# Patient Record
Sex: MALE | Race: WHITE | NOT HISPANIC OR LATINO | Employment: OTHER | URBAN - METROPOLITAN AREA
[De-identification: names, ages, dates, MRNs, and addresses within clinical notes are randomized per-mention and may not be internally consistent; named-entity substitution may affect disease eponyms.]

---

## 2017-01-04 ENCOUNTER — TELEPHONE (OUTPATIENT)
Dept: FAMILY MEDICINE | Facility: CLINIC | Age: 77
End: 2017-01-04

## 2017-01-04 DIAGNOSIS — I10 ESSENTIAL HYPERTENSION: ICD-10-CM

## 2017-01-04 DIAGNOSIS — E78.5 HYPERLIPIDEMIA LDL GOAL <100: ICD-10-CM

## 2017-01-04 DIAGNOSIS — E11.40 TYPE 2 DIABETES, CONTROLLED, WITH NEUROPATHY: Primary | ICD-10-CM

## 2017-01-04 NOTE — TELEPHONE ENCOUNTER
----- Message from Treasure Tipton sent at 1/4/2017  3:33 PM CST -----  Contact: self  Patient requesting labs and would like to come in 2 days prior to OV on 2/3. Please contact Mr Gunter at [phone].    Thanks!

## 2017-01-26 ENCOUNTER — LAB VISIT (OUTPATIENT)
Dept: LAB | Facility: HOSPITAL | Age: 77
End: 2017-01-26
Attending: INTERNAL MEDICINE
Payer: MEDICARE

## 2017-01-26 DIAGNOSIS — E78.5 HYPERLIPIDEMIA LDL GOAL <100: ICD-10-CM

## 2017-01-26 DIAGNOSIS — I10 ESSENTIAL HYPERTENSION: ICD-10-CM

## 2017-01-26 DIAGNOSIS — E11.40 TYPE 2 DIABETES, CONTROLLED, WITH NEUROPATHY: ICD-10-CM

## 2017-01-26 LAB
ALBUMIN SERPL BCP-MCNC: 4.1 G/DL
ALP SERPL-CCNC: 62 U/L
ALT SERPL W/O P-5'-P-CCNC: 43 U/L
ANION GAP SERPL CALC-SCNC: 7 MMOL/L
AST SERPL-CCNC: 29 U/L
BASOPHILS # BLD AUTO: 0.02 K/UL
BASOPHILS NFR BLD: 0.4 %
BILIRUB SERPL-MCNC: 1.1 MG/DL
BUN SERPL-MCNC: 8 MG/DL
CALCIUM SERPL-MCNC: 9.7 MG/DL
CHLORIDE SERPL-SCNC: 106 MMOL/L
CHOLEST/HDLC SERPL: 2.5 {RATIO}
CO2 SERPL-SCNC: 29 MMOL/L
CREAT SERPL-MCNC: 1 MG/DL
DIFFERENTIAL METHOD: ABNORMAL
EOSINOPHIL # BLD AUTO: 0.1 K/UL
EOSINOPHIL NFR BLD: 1.3 %
ERYTHROCYTE [DISTWIDTH] IN BLOOD BY AUTOMATED COUNT: 14.7 %
EST. GFR  (AFRICAN AMERICAN): >60 ML/MIN/1.73 M^2
EST. GFR  (NON AFRICAN AMERICAN): >60 ML/MIN/1.73 M^2
GLUCOSE SERPL-MCNC: 106 MG/DL
HCT VFR BLD AUTO: 46.1 %
HDL/CHOLESTEROL RATIO: 40.4 %
HDLC SERPL-MCNC: 141 MG/DL
HDLC SERPL-MCNC: 57 MG/DL
HGB BLD-MCNC: 15.4 G/DL
LDLC SERPL CALC-MCNC: 71.6 MG/DL
LYMPHOCYTES # BLD AUTO: 2.2 K/UL
LYMPHOCYTES NFR BLD: 41.3 %
MCH RBC QN AUTO: 29.6 PG
MCHC RBC AUTO-ENTMCNC: 33.4 %
MCV RBC AUTO: 89 FL
MONOCYTES # BLD AUTO: 0.3 K/UL
MONOCYTES NFR BLD: 5.9 %
NEUTROPHILS # BLD AUTO: 2.8 K/UL
NEUTROPHILS NFR BLD: 50.9 %
NONHDLC SERPL-MCNC: 84 MG/DL
PLATELET # BLD AUTO: 233 K/UL
PMV BLD AUTO: 11 FL
POTASSIUM SERPL-SCNC: 5.7 MMOL/L
PROT SERPL-MCNC: 7.3 G/DL
RBC # BLD AUTO: 5.2 M/UL
SODIUM SERPL-SCNC: 142 MMOL/L
TRIGL SERPL-MCNC: 62 MG/DL
WBC # BLD AUTO: 5.43 K/UL

## 2017-01-26 PROCEDURE — 80053 COMPREHEN METABOLIC PANEL: CPT

## 2017-01-26 PROCEDURE — 36415 COLL VENOUS BLD VENIPUNCTURE: CPT | Mod: PO

## 2017-01-26 PROCEDURE — 85025 COMPLETE CBC W/AUTO DIFF WBC: CPT

## 2017-01-26 PROCEDURE — 80061 LIPID PANEL: CPT

## 2017-01-26 PROCEDURE — 83036 HEMOGLOBIN GLYCOSYLATED A1C: CPT

## 2017-01-27 LAB
ESTIMATED AVG GLUCOSE: 120 MG/DL
HBA1C MFR BLD HPLC: 5.8 %

## 2017-02-03 ENCOUNTER — OFFICE VISIT (OUTPATIENT)
Dept: FAMILY MEDICINE | Facility: CLINIC | Age: 77
End: 2017-02-03
Payer: MEDICARE

## 2017-02-03 VITALS
BODY MASS INDEX: 24.8 KG/M2 | WEIGHT: 167.44 LBS | OXYGEN SATURATION: 97 % | HEART RATE: 52 BPM | DIASTOLIC BLOOD PRESSURE: 78 MMHG | HEIGHT: 69 IN | SYSTOLIC BLOOD PRESSURE: 130 MMHG | TEMPERATURE: 98 F

## 2017-02-03 DIAGNOSIS — E11.40 TYPE 2 DIABETES, CONTROLLED, WITH NEUROPATHY: ICD-10-CM

## 2017-02-03 DIAGNOSIS — Z00.00 ROUTINE MEDICAL EXAM: Primary | ICD-10-CM

## 2017-02-03 DIAGNOSIS — I10 ESSENTIAL HYPERTENSION: ICD-10-CM

## 2017-02-03 DIAGNOSIS — E78.5 HYPERLIPIDEMIA LDL GOAL <100: ICD-10-CM

## 2017-02-03 DIAGNOSIS — F41.1 ANXIETY STATE, UNSPECIFIED: ICD-10-CM

## 2017-02-03 DIAGNOSIS — M25.511 RIGHT SHOULDER PAIN, UNSPECIFIED CHRONICITY: ICD-10-CM

## 2017-02-03 DIAGNOSIS — R13.12 DYSPHAGIA, OROPHARYNGEAL: ICD-10-CM

## 2017-02-03 LAB
CREAT UR-MCNC: 31 MG/DL
MICROALBUMIN UR DL<=1MG/L-MCNC: <2.5 UG/ML
MICROALBUMIN/CREATININE RATIO: NORMAL UG/MG

## 2017-02-03 PROCEDURE — 3075F SYST BP GE 130 - 139MM HG: CPT | Mod: S$GLB,,, | Performed by: INTERNAL MEDICINE

## 2017-02-03 PROCEDURE — 99999 PR PBB SHADOW E&M-EST. PATIENT-LVL III: CPT | Mod: PBBFAC,,, | Performed by: INTERNAL MEDICINE

## 2017-02-03 PROCEDURE — 99397 PER PM REEVAL EST PAT 65+ YR: CPT | Mod: S$GLB,,, | Performed by: INTERNAL MEDICINE

## 2017-02-03 PROCEDURE — 99499 UNLISTED E&M SERVICE: CPT | Mod: S$PBB,,, | Performed by: INTERNAL MEDICINE

## 2017-02-03 PROCEDURE — 3078F DIAST BP <80 MM HG: CPT | Mod: S$GLB,,, | Performed by: INTERNAL MEDICINE

## 2017-02-03 PROCEDURE — 82570 ASSAY OF URINE CREATININE: CPT

## 2017-02-03 NOTE — MR AVS SNAPSHOT
[facility]  [address]  [place] [address]  Phone: [phone]  Fax: [phone]                  Diego Gunter   2/3/2017 9:40 AM   Office Visit    Description:  Male : 1940   Provider:  Portia Ferreira MD   Department:  [facility]           Reason for Visit     Diabetes     Follow-up           Diagnoses this Visit        Comments    Routine medical exam    -  Primary     Type 2 diabetes, controlled, with neuropathy         Essential hypertension         Hyperlipidemia LDL goal <100         Anxiety state, unspecified         Dysphagia, oropharyngeal         Right shoulder pain, unspecified chronicity                To Do List           Goals (5 Years of Data)              17    HEMOGLOBIN A1C < 7.0   5.8  5.8  5.8    Related Problems    Type 2 diabetes, controlled, with neuropathy      Follow-Up and Disposition     Return in about 6 months (around 8/3/2017), or if symptoms worsen or fail to improve, for follow up chronic medical conditions..      [facility] On Call     [facility] On Call Nurse Care Line -  Assistance  Registered nurses in the [facility] On Call Center provide clinical advisement, health education, appointment booking, and other advisory services.  Call for this free service at [phone].             Medications           Message regarding Medications     Verify the changes and/or additions to your medication regime listed below are the same as discussed with your clinician today.  If any of these changes or additions are incorrect, please notify your healthcare provider.             Verify that the below list of medications is an accurate representation of the medications you are currently taking.  If none reported, the list may be blank. If incorrect, please contact your healthcare provider. Carry this list with you in case of emergency.           Current Medications     ascorbic acid (VITAMIN C) 1000 MG tablet  "Take 1,000 mg by mouth once daily.      loratadine (CLARITIN) 10 mg tablet Take 1 tablet (10 mg total) by mouth daily as needed for Allergies (or runny nose).    omega 3-dha-epa-fish oil 1,000 (120-180) mg Cap Take 1 capsule by mouth once daily.      omeprazole 20 mg TbEC 1/2 tablet daily in am as needed.    pravastatin (PRAVACHOL) 10 MG tablet TAKE ONE TABLET BY MOUTH ONCE DAILY    cetirizine (ZYRTEC) 10 MG tablet Take 1 tablet (10 mg total) by mouth every evening.    hyoscyamine (ANASPAZ,LEVSIN) 0.125 mg Tab Take 1 tablet (125 mcg total) by mouth every 4 (four) hours as needed.           Clinical Reference Information           Your Vitals Were     BP Pulse Temp Height Weight SpO2    130/78 52 98.2 °F (36.8 °C) (Oral) 5' 9" (1.753 m) 76 kg (167 lb 7 oz) 97%    BMI                24.73 kg/m2          Blood Pressure          Most Recent Value    BP  130/78      Allergies as of 2/3/2017     No Known Allergies      Immunizations Administered on Date of Encounter - 2/3/2017     None      Orders Placed During Today's Visit      Normal Orders This Visit    Microalbumin/creatinine urine ratio       Instructions    Colace stool softener  Fiber supplements: colace, metamucil, fibercon       Language Assistance Services     ATTENTION: Language assistance services are available, free of charge. Please call [phone].      ATENCIÓN: Si habla español, tiene a thompson disposición servicios gratuitos de asistencia lingüística. Llame al [phone].     [facility] Ý: N?u b?n nói Ti?ng Vi?t, có các d?ch v? h? tr? ngôn ng? mi?n phí dành cho b?n. G?i s? [phone].         [facility] complies with applicable Federal civil rights laws and does not discriminate on the basis of race, color, national origin, age, disability, or sex.        "

## 2017-02-03 NOTE — PROGRESS NOTES
HISTORY OF PRESENT ILLNESS:  Diego Gunter is a 76 y.o. male who presents to the clinic today for a routine medical physical exam. His last physical exam was approximately 1 years(s) ago.      PAST MEDICAL HISTORY:  Past Medical History   Diagnosis Date    Hx of colonic polyps      followed by GI. Dr. Pham    Hyperlipidemia LDL goal <100     Overweight(278.02)     Prostate cancer september 2011     followed by urology, Dr. Rogers    Type II or unspecified type diabetes mellitus without mention of complication, not stated as uncontrolled      diet controlled       PAST SURGICAL HISTORY:  Past Surgical History   Procedure Laterality Date    Prostatectomy      Cataract extraction, bilateral  2014       SOCIAL HISTORY:  Social History     Social History    Marital status:      Spouse name: N/A    Number of children: 2    Years of education: N/A     Occupational History    tool       Social History Main Topics    Smoking status: Former Smoker    Smokeless tobacco: Never Used      Comment: quit age 23    Alcohol use Yes      Comment: beer occasionally    Drug use: Not on file    Sexual activity: Not on file     Other Topics Concern    Not on file     Social History Narrative       FAMILY HISTORY:  Family History   Problem Relation Age of Onset    Colon cancer Mother     Diabetes Mother     Heart disease Father     Mental illness Sister     Diabetes Sister     Other Brother      polio as a child    Diabetes      Diabetes Brother     Myasthenia gravis Brother     Parkinsonism Brother     Diabetes Brother     Dementia Brother     Lung cancer Brother      smoker    Diabetes Maternal Aunt        ALLERGIES AND MEDICATIONS: updated and reviewed.  Review of patient's allergies indicates:  No Known Allergies  Medication List with Changes/Refills   Current Medications    ASCORBIC ACID (VITAMIN C) 1000 MG TABLET    Take 1,000 mg by mouth once daily.      CETIRIZINE (ZYRTEC) 10  MG TABLET    Take 1 tablet (10 mg total) by mouth every evening.    HYOSCYAMINE (ANASPAZ,LEVSIN) 0.125 MG TAB    Take 1 tablet (125 mcg total) by mouth every 4 (four) hours as needed.    LORATADINE (CLARITIN) 10 MG TABLET    Take 1 tablet (10 mg total) by mouth daily as needed for Allergies (or runny nose).    OMEGA 3-DHA-EPA-FISH OIL 1,000 (120-180) MG CAP    Take 1 capsule by mouth once daily.      OMEPRAZOLE 20 MG TBEC    1/2 tablet daily in am as needed.    PRAVASTATIN (PRAVACHOL) 10 MG TABLET    TAKE ONE TABLET BY MOUTH ONCE DAILY             SCREENING HISTORY:  Health Maintenance       Date Due Completion Date    Urine Microalbumin 8/5/2016 8/5/2015    Eye Exam 7/7/2017 7/7/2016 (Done)    Override on 7/7/2016: Done (Dr. Silva)    Override on 2/3/2015: Done (Dr. Silva - no diabetic retinopathy)    Override on 6/27/2014: Done (Done by Dr. Silva)    Hemoglobin A1c 7/26/2017 1/26/2017    Foot Exam 7/29/2017 7/29/2016    Lipid Panel 1/26/2018 1/26/2017    TETANUS VACCINE 3/8/2025 3/8/2015    Colonoscopy 7/22/2025 7/22/2015 (Done)    Override on 7/22/2015: Done ([facility]. Repeat in 5 years)    Override on 12/1/2005: Done            REVIEW OF SYSTEMS:  The patient reports good dietary habits.  The patient does exercise regularly.  General ROS: negative for - chills, fever or malaise  Psychological ROS: positive for - mild memory difficulties  negative for - obsessive thoughts or suicidal ideation  Ophthalmic ROS: negative for - blurry vision or eye pain  ENT ROS: negative for - epistaxis, headaches, nasal congestion, oral lesions or sore throat  Allergy and Immunology ROS: negative for - hives  Hematological and Lymphatic ROS: negative for - swollen lymph nodes  Endocrine ROS: negative for - polydipsia/polyuria or temperature intolerance  Respiratory ROS: no cough, shortness of breath, or wheezing  Cardiovascular ROS: no chest pain or dyspnea on exertion  Gastrointestinal ROS: no abdominal pain, change in  "bowel habits, or black or bloody stools  Dermatological ROS: negative for mole changes and rash  Musculoskeletal ROS: negative for - gait disturbance, joint swelling, muscle pain or muscular weakness; he is seeing ortho for mild right shoulder pain  Neurological ROS: no TIA or stroke symptoms  Genito-Urinary ROS: no dysuria, trouble voiding, or hematuria        PHYSICAL EXAM:  Vitals:    02/03/17 0932   BP: 130/78   Pulse: (!) 52   Temp: 98.2 °F (36.8 °C)     Weight: 76 kg (167 lb 7 oz)   Height: 5' 9" (175.3 cm)   Body mass index is 24.73 kg/(m^2).    Physical Examination: General appearance - alert, well appearing, and in no distress and normal appearing weight  Mental status - alert, oriented to person, place, and time, normal mood, behavior, speech, dress, motor activity, and thought processes  Eyes - pupils equal and reactive, extraocular eye movements intact, sclera anicteric  Mouth - mucous membranes moist, pharynx normal without lesions  Neck - supple, no significant adenopathy, carotids upstroke normal bilaterally, no bruits, thyroid exam: thyroid is normal in size without nodules or tenderness  Lymphatics - no palpable lymphadenopathy  Chest - clear to auscultation, no wheezes, rales or rhonchi, symmetric air entry  Heart - normal rate and regular rhythm, no gallops noted  Abdomen - soft, nontender, nondistended, no masses or organomegaly   Male - not examined  Back exam - full range of motion, no tenderness, palpable spasm or pain on motion  Neurological - alert, oriented, normal speech, no focal findings or movement disorder noted, cranial nerves II through XII intact  Musculoskeletal - no joint tenderness, deformity or swelling, no muscular tenderness noted  Extremities - peripheral pulses normal, no pedal edema, no clubbing or cyanosis  Skin - normal coloration and turgor, no rashes, no suspicious skin lesions noted       Results for orders placed or performed in visit on 01/26/17   Comprehensive " metabolic panel   Result Value Ref Range    Sodium 142 136 - 145 mmol/L    Potassium 5.7 (H) 3.5 - 5.1 mmol/L    Chloride 106 95 - 110 mmol/L    CO2 29 23 - 29 mmol/L    Glucose 106 70 - 110 mg/dL    BUN, Bld 8 8 - 23 mg/dL    Creatinine 1.0 0.5 - 1.4 mg/dL    Calcium 9.7 8.7 - 10.5 mg/dL    Total Protein 7.3 6.0 - 8.4 g/dL    Albumin 4.1 3.5 - 5.2 g/dL    Total Bilirubin 1.1 (H) 0.1 - 1.0 mg/dL    Alkaline Phosphatase 62 55 - 135 U/L    AST 29 10 - 40 U/L    ALT 43 10 - 44 U/L    Anion Gap 7 (L) 8 - 16 mmol/L    eGFR if African American >60.0 >60 mL/min/1.73 m^2    eGFR if non African American >60.0 >60 mL/min/1.73 m^2   Lipid panel   Result Value Ref Range    Cholesterol 141 120 - 199 mg/dL    Triglycerides 62 30 - 150 mg/dL    HDL 57 40 - 75 mg/dL    LDL Cholesterol 71.6 63.0 - 159.0 mg/dL    HDL/Chol Ratio 40.4 20.0 - 50.0 %    Total Cholesterol/HDL Ratio 2.5 2.0 - 5.0    Non-HDL Cholesterol 84 mg/dL   Hemoglobin A1c   Result Value Ref Range    Hemoglobin A1C 5.8 4.5 - 6.2 %    Estimated Avg Glucose 120 68 - 131 mg/dL   CBC auto differential   Result Value Ref Range    WBC 5.43 3.90 - 12.70 K/uL    RBC 5.20 4.60 - 6.20 M/uL    Hemoglobin 15.4 14.0 - 18.0 g/dL    Hematocrit 46.1 40.0 - 54.0 %    MCV 89 82 - 98 fL    MCH 29.6 27.0 - 31.0 pg    MCHC 33.4 32.0 - 36.0 %    RDW 14.7 (H) 11.5 - 14.5 %    Platelets 233 150 - 350 K/uL    MPV 11.0 9.2 - 12.9 fL    Gran # 2.8 1.8 - 7.7 K/uL    Lymph # 2.2 1.0 - 4.8 K/uL    Mono # 0.3 0.3 - 1.0 K/uL    Eos # 0.1 0.0 - 0.5 K/uL    Baso # 0.02 0.00 - 0.20 K/uL    Gran% 50.9 38.0 - 73.0 %    Lymph% 41.3 18.0 - 48.0 %    Mono% 5.9 4.0 - 15.0 %    Eosinophil% 1.3 0.0 - 8.0 %    Basophil% 0.4 0.0 - 1.9 %    Differential Method Automated           ASSESSMENT AND PLAN:  1. Routine medical exam  Counseled on age appropriate medical preventative services including age appropriate cancer screenings, age appropriate eye and dental exams, over all nutritional health, need for a  consistent exercise regimen, and an over all push towards maintaining a vigorous and active lifestyle.  Counseled on age appropriate vaccines and discussed upcoming health care needs based on age/gender. Discussed good sleep hygiene and stress management.     2. Type 2 diabetes, controlled, with neuropathy  Stable. We discussed low sugar/low carbohydrate diet and regular exercise to prevent progression. No need for prescription medication at this time.  Not on ACE inhibitor due to intermittent hyperkalemia  - Microalbumin/creatinine urine ratio    3. Essential hypertension  Discussed sodium restriction, maintaining ideal body weight and regular exercise program as physiologic means to achieve blood pressure control. The patient will strive towards this. No need for prescription medication at this time.. Recommended patient to check home readings to monitor and see me for followup as scheduled or sooner as needed. Patient was educated that both decongestant and anti-inflammatory medication may raise blood pressure.     4. Hyperlipidemia LDL goal <100  We discussed low fat diet and regular exercise.The current medical regimen is effective;  continue present plan and medications.      5. Anxiety state, unspecified  Stable. Medication as needed.     6. Dysphagia, oropharyngeal  Resolved with ST.    7. Right shoulder pain, unspecified chronicity  He is currently seeing orthopedics and has had a shoulder injection and is doing physical therapy.  Symptoms so far have much improved.          Return in about 6 months (around 8/3/2017), or if symptoms worsen or fail to improve, for follow up chronic medical conditions.. or sooner as needed.

## 2017-03-02 DIAGNOSIS — E78.5 HYPERLIPIDEMIA LDL GOAL <100: ICD-10-CM

## 2017-03-02 RX ORDER — PRAVASTATIN SODIUM 10 MG/1
TABLET ORAL
Qty: 90 TABLET | Refills: 1 | Status: SHIPPED | OUTPATIENT
Start: 2017-03-02 | End: 2017-09-06 | Stop reason: SDUPTHER

## 2017-03-06 ENCOUNTER — TELEPHONE (OUTPATIENT)
Dept: NEUROLOGY | Facility: CLINIC | Age: 77
End: 2017-03-06

## 2017-03-06 NOTE — TELEPHONE ENCOUNTER
----- Message from Jonnie Gonzales sent at 3/6/2017 10:05 AM CST -----  Contact: self [phone]  Patient is calling to schedule a f/u visit, pls call

## 2017-06-02 ENCOUNTER — TELEPHONE (OUTPATIENT)
Dept: FAMILY MEDICINE | Facility: CLINIC | Age: 77
End: 2017-06-02

## 2017-06-02 DIAGNOSIS — E78.5 HYPERLIPIDEMIA LDL GOAL <100: ICD-10-CM

## 2017-06-02 DIAGNOSIS — E11.9 TYPE II DIABETES MELLITUS, WELL CONTROLLED: ICD-10-CM

## 2017-06-02 DIAGNOSIS — I10 ESSENTIAL HYPERTENSION: Primary | ICD-10-CM

## 2017-06-02 NOTE — TELEPHONE ENCOUNTER
----- Message from Clary Gonzalez sent at 6/2/2017  1:30 PM CDT -----  Contact: self [phone]  Pt has a appt on 8-14-17, requesting a lab appt. Pls call pt [phone]. Thanks...........

## 2017-08-08 ENCOUNTER — LAB VISIT (OUTPATIENT)
Dept: LAB | Facility: HOSPITAL | Age: 77
End: 2017-08-08
Attending: INTERNAL MEDICINE
Payer: MEDICARE

## 2017-08-08 DIAGNOSIS — E11.9 TYPE II DIABETES MELLITUS, WELL CONTROLLED: ICD-10-CM

## 2017-08-08 DIAGNOSIS — I10 ESSENTIAL HYPERTENSION: ICD-10-CM

## 2017-08-08 LAB
ANION GAP SERPL CALC-SCNC: 8 MMOL/L
BUN SERPL-MCNC: 8 MG/DL
CALCIUM SERPL-MCNC: 9.4 MG/DL
CHLORIDE SERPL-SCNC: 104 MMOL/L
CO2 SERPL-SCNC: 30 MMOL/L
CREAT SERPL-MCNC: 0.9 MG/DL
EST. GFR  (AFRICAN AMERICAN): >60 ML/MIN/1.73 M^2
EST. GFR  (NON AFRICAN AMERICAN): >60 ML/MIN/1.73 M^2
ESTIMATED AVG GLUCOSE: 108 MG/DL
GLUCOSE SERPL-MCNC: 96 MG/DL
HBA1C MFR BLD HPLC: 5.4 %
POTASSIUM SERPL-SCNC: 4.6 MMOL/L
SODIUM SERPL-SCNC: 142 MMOL/L

## 2017-08-08 PROCEDURE — 83036 HEMOGLOBIN GLYCOSYLATED A1C: CPT

## 2017-08-08 PROCEDURE — 80048 BASIC METABOLIC PNL TOTAL CA: CPT

## 2017-08-08 PROCEDURE — 36415 COLL VENOUS BLD VENIPUNCTURE: CPT | Mod: PO

## 2017-08-11 DIAGNOSIS — R13.10 DYSPHAGIA: Primary | ICD-10-CM

## 2017-08-14 ENCOUNTER — OFFICE VISIT (OUTPATIENT)
Dept: FAMILY MEDICINE | Facility: CLINIC | Age: 77
End: 2017-08-14
Payer: MEDICARE

## 2017-08-14 VITALS
BODY MASS INDEX: 24.22 KG/M2 | WEIGHT: 163.5 LBS | SYSTOLIC BLOOD PRESSURE: 96 MMHG | HEART RATE: 47 BPM | HEIGHT: 69 IN | OXYGEN SATURATION: 98 % | TEMPERATURE: 98 F | DIASTOLIC BLOOD PRESSURE: 64 MMHG

## 2017-08-14 DIAGNOSIS — E78.5 HYPERLIPIDEMIA LDL GOAL <100: ICD-10-CM

## 2017-08-14 DIAGNOSIS — R13.12 DYSPHAGIA, OROPHARYNGEAL: ICD-10-CM

## 2017-08-14 DIAGNOSIS — R06.02 SHORTNESS OF BREATH: ICD-10-CM

## 2017-08-14 DIAGNOSIS — E11.40 TYPE 2 DIABETES, CONTROLLED, WITH NEUROPATHY: Primary | ICD-10-CM

## 2017-08-14 DIAGNOSIS — I10 ESSENTIAL HYPERTENSION: ICD-10-CM

## 2017-08-14 DIAGNOSIS — R53.83 FATIGUE, UNSPECIFIED TYPE: ICD-10-CM

## 2017-08-14 PROCEDURE — 3044F HG A1C LEVEL LT 7.0%: CPT | Mod: S$GLB,,, | Performed by: INTERNAL MEDICINE

## 2017-08-14 PROCEDURE — 3078F DIAST BP <80 MM HG: CPT | Mod: S$GLB,,, | Performed by: INTERNAL MEDICINE

## 2017-08-14 PROCEDURE — 99214 OFFICE O/P EST MOD 30 MIN: CPT | Mod: S$GLB,,, | Performed by: INTERNAL MEDICINE

## 2017-08-14 PROCEDURE — 99999 PR PBB SHADOW E&M-EST. PATIENT-LVL III: CPT | Mod: PBBFAC,,, | Performed by: INTERNAL MEDICINE

## 2017-08-14 PROCEDURE — 3074F SYST BP LT 130 MM HG: CPT | Mod: S$GLB,,, | Performed by: INTERNAL MEDICINE

## 2017-08-14 PROCEDURE — 1126F AMNT PAIN NOTED NONE PRSNT: CPT | Mod: S$GLB,,, | Performed by: INTERNAL MEDICINE

## 2017-08-14 PROCEDURE — 99499 UNLISTED E&M SERVICE: CPT | Mod: S$PBB,,, | Performed by: INTERNAL MEDICINE

## 2017-08-14 PROCEDURE — 3008F BODY MASS INDEX DOCD: CPT | Mod: S$GLB,,, | Performed by: INTERNAL MEDICINE

## 2017-08-14 PROCEDURE — 1159F MED LIST DOCD IN RCRD: CPT | Mod: S$GLB,,, | Performed by: INTERNAL MEDICINE

## 2017-08-14 NOTE — PROGRESS NOTES
HISTORY OF PRESENT ILLNESS:  Diego Gunter is a 76 y.o. male who presents to the clinic today for Diabetes  .  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  He does not check his blood pressures or blood sugars at home.  He reports good dietary habits.  He exercises regularly.  Unfortunately, his dysphagia is worsening again.  He has an appointment set up in the near future for reevaluation with speech therapy.  He recently had blood work done and is here today to discuss the results.  He does complain of some shortness of breath and fatigue.  He denies cardiac chest pain.  No abdominal pain, temperature intolerance, or unexplained changes in his weight.      PAST MEDICAL HISTORY:  Past Medical History:   Diagnosis Date    Hx of colonic polyps     followed by GI. Dr. Pham    Hyperlipidemia LDL goal <100     Overweight(278.02)     Prostate cancer september 2011    followed by urology, Dr. Rogers    Type II or unspecified type diabetes mellitus without mention of complication, not stated as uncontrolled     diet controlled       PAST SURGICAL HISTORY:  Past Surgical History:   Procedure Laterality Date    CATARACT EXTRACTION, BILATERAL  2014    PROSTATECTOMY         SOCIAL HISTORY:  Social History     Social History    Marital status:      Spouse name: N/A    Number of children: 2    Years of education: N/A     Occupational History    tool       Social History Main Topics    Smoking status: Former Smoker    Smokeless tobacco: Never Used      Comment: quit age 23    Alcohol use Yes      Comment: beer occasionally    Drug use: No    Sexual activity: Yes     Partners: Female     Other Topics Concern    Not on file     Social History Narrative    No narrative on file       FAMILY HISTORY:  Family History   Problem Relation Age of Onset    Colon cancer Mother     Diabetes Mother      Heart disease Father     Mental illness Sister     Diabetes Sister     Other Brother      polio as a child    Diabetes      Diabetes Brother     Myasthenia gravis Brother     Parkinsonism Brother     Diabetes Brother     Dementia Brother     Lung cancer Brother      smoker    Diabetes Maternal Aunt        ALLERGIES AND MEDICATIONS: updated and reviewed.  Review of patient's allergies indicates:  No Known Allergies  Medication List with Changes/Refills   Current Medications    ASCORBIC ACID (VITAMIN C) 1000 MG TABLET    Take 1,000 mg by mouth once daily.      CETIRIZINE (ZYRTEC) 10 MG TABLET    Take 1 tablet (10 mg total) by mouth every evening.    HYOSCYAMINE (ANASPAZ,LEVSIN) 0.125 MG TAB    Take 1 tablet (125 mcg total) by mouth every 4 (four) hours as needed.    LORATADINE (CLARITIN) 10 MG TABLET    Take 1 tablet (10 mg total) by mouth daily as needed for Allergies (or runny nose).    OMEGA 3-DHA-EPA-FISH OIL 1,000 (120-180) MG CAP    Take 1 capsule by mouth once daily.      OMEPRAZOLE 20 MG TBEC    1/2 tablet daily in am as needed.    PRAVASTATIN (PRAVACHOL) 10 MG TABLET    TAKE ONE TABLET BY MOUTH ONCE DAILY            REVIEW OF SYSTEMS:  General ROS: negative for - chills, fever or malaise  Psychological ROS: negative for - anxiety, depression, sleep disturbances or suicidal ideation  Ophthalmic ROS: negative for - blurry vision or eye pain  ENT ROS: negative for - epistaxis, headaches, nasal congestion, oral lesions or sore throat  Allergy and Immunology ROS: negative for - hives  Hematological and Lymphatic ROS: negative for - swollen lymph nodes  Endocrine ROS: negative for - polydipsia/polyuria or temperature intolerance  Respiratory ROS: negative for - hemoptysis, sputum changes or wheezing  Cardiovascular ROS: negative for - chest pain or palpitations  Gastrointestinal ROS: no abdominal pain, change in bowel habits, or black or bloody stools  Dermatological ROS: negative for mole changes  "and rash  Musculoskeletal ROS: negative for - gait disturbance, joint swelling or muscle pain  Neurological ROS: no TIA or stroke symptoms  Genito-Urinary ROS: no dysuria, trouble voiding, or hematuria        PHYSICAL EXAM:  Vitals:    08/14/17 0819   BP: 96/64   Pulse: (!) 47   Temp: 97.6 °F (36.4 °C)     Weight: 74.2 kg (163 lb 7.5 oz)   Height: 5' 9" (175.3 cm)   Body mass index is 24.14 kg/m².    Physical Examination: General appearance - alert, well appearing, and in no distress and normal appearing weight  Mental status - alert, oriented to person, place, and time, normal mood, behavior, speech, dress, motor activity, and thought processes  Eyes - pupils equal and reactive, extraocular eye movements intact, sclera anicteric  Mouth - mucous membranes moist, pharynx normal without lesions  Neck - supple, no significant adenopathy, carotids upstroke normal bilaterally, no bruits, thyroid exam: thyroid is normal in size without nodules or tenderness  Lymphatics - no palpable lymphadenopathy  Chest - clear to auscultation, no wheezes, rales or rhonchi, symmetric air entry  Heart - normal rhythm with mild slowing of the rate (regular rate), no gallops noted  Neurological - alert, oriented, normal speech, no focal findings or movement disorder noted, cranial nerves II through XII intact  Musculoskeletal - no joint tenderness, deformity or swelling, no muscular tenderness noted  Extremities - no pedal edema noted, intact peripheral pulses  Skin - normal coloration and turgor, no rashes, no suspicious skin lesions noted       Protective Sensation (w/ 10 gram monofilament):  Right: Intact  Left: Intact    Visual Inspection:  Normal -  Bilateral    Pedal Pulses:   Right: Present  Left: Present    Posterior tibialis:   Right:Present  Left: Present         Results for orders placed or performed in visit on 08/08/17   Basic metabolic panel   Result Value Ref Range    Sodium 142 136 - 145 mmol/L    Potassium 4.6 3.5 - 5.1 " mmol/L    Chloride 104 95 - 110 mmol/L    CO2 30 (H) 23 - 29 mmol/L    Glucose 96 70 - 110 mg/dL    BUN, Bld 8 8 - 23 mg/dL    Creatinine 0.9 0.5 - 1.4 mg/dL    Calcium 9.4 8.7 - 10.5 mg/dL    Anion Gap 8 8 - 16 mmol/L    eGFR if African American >60.0 >60 mL/min/1.73 m^2    eGFR if non African American >60.0 >60 mL/min/1.73 m^2   Hemoglobin A1c   Result Value Ref Range    Hemoglobin A1C 5.4 4.0 - 5.6 %    Estimated Avg Glucose 108 68 - 131 mg/dL          ASSESSMENT AND PLAN:  1. Type 2 diabetes, controlled, with neuropathy  Diabetes currently is controlled. We discussed diabetic diet and regular exercise. We discussed home blood sugar monitoring, if appropriate. Stable. We discussed low sugar/low carbohydrate diet and regular exercise to prevent progression. No need for prescription medication at this time.      2. Essential hypertension  Discussed sodium restriction, maintaining ideal body weight and regular exercise program as physiologic means to achieve blood pressure control. The patient will strive towards this. The current medical regimen is effective;  continue present plan and medications. Recommended patient to check home readings to monitor and see me for followup as scheduled or sooner as needed. Patient was educated that both decongestant and anti-inflammatory medication may raise blood pressure.     3. Hyperlipidemia LDL goal <100  We discussed low fat diet and regular exercise.The current medical regimen is effective;  continue present plan and medications.      4. Shortness of breath/5.  Fatigue  I'm unsure of the etiology of his symptoms.  We will consult cardiology to rule out cardiac etiology.  This could be related to his dysphagia.  Consider pulmonary workup if no improvement in symptoms.  - Ambulatory referral to Cardiology    6. Dysphagia, oropharyngeal  This improved last year with speech therapy.  He has an appointment to see speech therapy in the near future for  reevaluation/treatment.          Return in about 6 months (around 2/14/2018), or if symptoms worsen or fail to improve, for annual exam. or sooner as needed.

## 2017-08-15 ENCOUNTER — TELEPHONE (OUTPATIENT)
Dept: FAMILY MEDICINE | Facility: CLINIC | Age: 77
End: 2017-08-15

## 2017-08-15 NOTE — TELEPHONE ENCOUNTER
----- Message from Mima Paz sent at 8/15/2017 12:42 PM CDT -----  REFERRAL: Pt requested to see Dr. Lundberg because his wife sees him. They are both scheduled on 8/23/17 here at [facility]. Thanks

## 2017-08-23 ENCOUNTER — INITIAL CONSULT (OUTPATIENT)
Dept: CARDIOLOGY | Facility: CLINIC | Age: 77
End: 2017-08-23
Payer: MEDICARE

## 2017-08-23 VITALS
HEIGHT: 69 IN | WEIGHT: 166.44 LBS | BODY MASS INDEX: 24.65 KG/M2 | HEART RATE: 52 BPM | DIASTOLIC BLOOD PRESSURE: 67 MMHG | SYSTOLIC BLOOD PRESSURE: 114 MMHG | OXYGEN SATURATION: 95 %

## 2017-08-23 DIAGNOSIS — I10 ESSENTIAL HYPERTENSION: Primary | ICD-10-CM

## 2017-08-23 DIAGNOSIS — E11.40 TYPE 2 DIABETES, CONTROLLED, WITH NEUROPATHY: ICD-10-CM

## 2017-08-23 DIAGNOSIS — I10 HYPERTENSION: ICD-10-CM

## 2017-08-23 DIAGNOSIS — E78.5 HYPERLIPIDEMIA LDL GOAL <100: ICD-10-CM

## 2017-08-23 PROCEDURE — 1159F MED LIST DOCD IN RCRD: CPT | Mod: S$GLB,,, | Performed by: INTERNAL MEDICINE

## 2017-08-23 PROCEDURE — 3074F SYST BP LT 130 MM HG: CPT | Mod: S$GLB,,, | Performed by: INTERNAL MEDICINE

## 2017-08-23 PROCEDURE — 99204 OFFICE O/P NEW MOD 45 MIN: CPT | Mod: S$GLB,,, | Performed by: INTERNAL MEDICINE

## 2017-08-23 PROCEDURE — 3008F BODY MASS INDEX DOCD: CPT | Mod: S$GLB,,, | Performed by: INTERNAL MEDICINE

## 2017-08-23 PROCEDURE — 3044F HG A1C LEVEL LT 7.0%: CPT | Mod: S$GLB,,, | Performed by: INTERNAL MEDICINE

## 2017-08-23 PROCEDURE — 99999 PR PBB SHADOW E&M-EST. PATIENT-LVL III: CPT | Mod: PBBFAC,,, | Performed by: INTERNAL MEDICINE

## 2017-08-23 PROCEDURE — 93010 ELECTROCARDIOGRAM REPORT: CPT | Mod: S$GLB,,, | Performed by: INTERNAL MEDICINE

## 2017-08-23 PROCEDURE — 3078F DIAST BP <80 MM HG: CPT | Mod: S$GLB,,, | Performed by: INTERNAL MEDICINE

## 2017-08-23 PROCEDURE — 99499 UNLISTED E&M SERVICE: CPT | Mod: S$PBB,,, | Performed by: INTERNAL MEDICINE

## 2017-08-23 PROCEDURE — 93005 ELECTROCARDIOGRAM TRACING: CPT | Mod: S$GLB,,, | Performed by: INTERNAL MEDICINE

## 2017-08-23 PROCEDURE — 1126F AMNT PAIN NOTED NONE PRSNT: CPT | Mod: S$GLB,,, | Performed by: INTERNAL MEDICINE

## 2017-08-23 NOTE — LETTER
August 23, 2017      Portia Ferreira MD  [address] [place]  [place] [address]           [facility]  [address] [facility]  [place] [address]  Phone: [phone]          Patient: Diego Gunter   MR Number: [id number]   YOB: 1940   Date of Visit: 8/23/2017       Dear Dr. Portia Ferreira:    Thank you for referring Diego Gunter to me for evaluation. Attached you will find relevant portions of my assessment and plan of care.    If you have questions, please do not hesitate to call me. I look forward to following Diego Gunter along with you.    Sincerely,    Yuan Lundberg MD    Enclosure  CC:  No Recipients    If you would like to receive this communication electronically, please contact [email or web address] or [phone] to request more information on [employer] access.    For providers and/or their staff who would like to refer a patient to [facility], please contact us through our one-stop-shop provider referral line, [facility], at [phone].    If you feel you have received this communication in error or would no longer like to receive these types of communications, please e-mail [email or web address]

## 2017-08-23 NOTE — PROGRESS NOTES
Subjective:    Patient ID:  Diego Gunter is a 76 y.o. male who presents for evaluation of Hypertension      HPI     HTN, DM, HLD, bradycardia    Referred by Dr Ferreira  Diego Gunter is a 76 y.o. male who presents to the clinic today for Diabetes  .  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  He does not check his blood pressures or blood sugars at home.  He reports good dietary habits.  He exercises regularly.  Unfortunately, his dysphagia is worsening again.  He has an appointment set up in the near future for reevaluation with speech therapy.  He recently had blood work done and is here today to discuss the results.  He does complain of some shortness of breath and fatigue.  He denies cardiac chest pain.  No abdominal pain, temperature intolerance, or unexplained changes in his weight.    Denies CP but gets SOB which has worsened some  SOB is at rest - goes to the gym regularly and does cardio without symptoms  No recent cardiac evaluation    EKG  Sinus bradycardia 50 otherwise ok       Review of Systems   Constitution: Negative for decreased appetite.   HENT: Negative for ear discharge.    Eyes: Negative for blurred vision.   Endocrine: Negative for polyphagia.   Skin: Negative for nail changes.   Neurological: Negative for aphonia.   Psychiatric/Behavioral: Negative for hallucinations.        Objective:    Physical Exam   Constitutional: He is oriented to person, place, and time. He appears well-developed and well-nourished.   HENT:   Head: Normocephalic and atraumatic.   Eyes: Conjunctivae are normal. Pupils are equal, round, and reactive to light.   Neck: Normal range of motion. Neck supple.   Cardiovascular: Normal rate, normal heart sounds and intact distal pulses.    Pulmonary/Chest: Effort normal and breath sounds normal.   Abdominal: Soft. Bowel sounds are normal.   Musculoskeletal: Normal range  of motion.   Neurological: He is alert and oriented to person, place, and time.   Skin: Skin is warm and dry.         Assessment:       1. Essential hypertension    2. Hyperlipidemia LDL goal <100    3. Type 2 diabetes, controlled, with neuropathy         Plan:        stress echo for SOB and bradycardia

## 2017-08-24 ENCOUNTER — HOSPITAL ENCOUNTER (OUTPATIENT)
Dept: RADIOLOGY | Facility: HOSPITAL | Age: 77
Discharge: HOME | End: 2017-08-24
Attending: OTOLARYNGOLOGY
Payer: MEDICARE

## 2017-08-24 DIAGNOSIS — R13.10 DYSPHAGIA: ICD-10-CM

## 2017-08-24 DIAGNOSIS — R13.12 DYSPHAGIA, OROPHARYNGEAL: Primary | ICD-10-CM

## 2017-08-24 PROCEDURE — G8997 SWALLOW GOAL STATUS: HCPCS | Mod: CJ

## 2017-08-24 PROCEDURE — 92611 MOTION FLUOROSCOPY/SWALLOW: CPT

## 2017-08-24 PROCEDURE — G8998 SWALLOW D/C STATUS: HCPCS | Mod: CJ

## 2017-08-24 PROCEDURE — 74230 X-RAY XM SWLNG FUNCJ C+: CPT | Mod: TC

## 2017-08-24 PROCEDURE — G8996 SWALLOW CURRENT STATUS: HCPCS | Mod: CJ

## 2017-08-24 PROCEDURE — 74230 X-RAY XM SWLNG FUNCJ C+: CPT | Mod: 26,,, | Performed by: RADIOLOGY

## 2017-08-24 NOTE — PROGRESS NOTES
"TIME RECORD    Date: 08/24/2017    Start Time:  1150  Stop Time:  1215    PROCEDURES:      UNTIMED  Procedure Min.   Modified Barium Swallow Study 25         Total Timed Minutes:  25  Total Timed Units:  0  Total Untimed Units:  1  Charges Billed/# of units:  1    REHAB SERVICE VIDEO SWALLOW STUDY    MRN:  [id number]  Referring Provider: Donovan Garcia MD  [address] [place] [id number]  Onset Date:  05/2016  SOC Date:  08/24/2017  Primary Diagnosis:   Oropharyngeal Dysphagia  Treatment Diagnosis:  Oropharyngeal Dysphagia  Pertinent Medical History:    Past Medical History:   Diagnosis Date    Hx of colonic polyps     followed by GI. Dr. Pham    Hyperlipidemia LDL goal <100     Overweight(278.02)     Prostate cancer september 2011    followed by urology, Dr. Rogers    Type II or unspecified type diabetes mellitus without mention of complication, not stated as uncontrolled     diet controlled     Past Surgical History:   Procedure Laterality Date    CATARACT EXTRACTION, BILATERAL  2014    PROSTATECTOMY         Prior Therapy Dates/Results (same condition):    Pt with a h/o Dysphagia and Speech therapy. Per most recent MBSS on 10/19/2016:     "Mr. Gunter presents to the [facility] for a repeat Modified Barium Swallow study s/p 22 sessions of dysphagia therapy . Pt has received outpatient speech therapy services to treat dysphagia 3 times per week since 6/17/2016. Pt has also been compliant with home exercise program, performing exercises 3-4 times per day.       Initial MBSS on 5/26/2016 revealed: "Moderate oropharyngeal dysphagia c/b globus sensation;delayed pharyngeal swallow;premature spillage;multiple spontaneous swallows;immediate coughing/choking;vallecular pooling;vallecular stasis. Pt with increasing vallecular stasis with thicker consistencies. Globus sensation consistent with vallecular stasis. Pt coughing throughout all trials likely " "due to globus sensation. No aspiration noted. Penetration noted. No blockage/obstruction. Pt with difficulty clearing vallecular stasis; max effort; chin tuck; multiple swallows; effortful swallow. ST RECS: regular and nectar thickened liquids. Strict aspiration precautions: upright for meals; small bites/sips; chin tuck; multiple swallows, alternate bites/sips; effortful swallow. Pt would be safest with puree and nectar thickened liquids. Informed pt that soft and smooth consistencies had less vallecular stasis and less effort to swallow. Showed pt pictures. Educated pt on aspiration risks/precautions, safe swallow strategies, and safest form of po; pt reports understanding. Recommend outpatient speech therapy services to for dysphagia therapy; pt may benefit from Vital Stim."     Pt was clinically evaluated on 6/17/2016 with a decreased tolerance of thickened liquids. Pt reports decreased intake of regular textures and coughing/throat clearing most of the time. Pt saw Dr. Gr on 7/6/2016 to discuss any possible neurological etiology of dysphagia; however there was no definitive etiology noted per her report. Pt is scheduled for f/u with Dr. Gr on 10/6/2016.      Repeat MBSS was completed on 6/24/2016 and revealed: moderate oropharyngeal dysphagia c/b decreased oral motor skills, decreased hyolaryngeal elevation/excursion, decreased tongue base retraction, decreased pharyngeal constriction, and decreased vocal fold closure. Penetration and aspiration noted during the swallow of thin and nectar thick liquids.     Pt had a MBSS completed 8/19/2016 and revealed: moderate oropharyngeal dysphagia c/b decreased oral motor skills, delayed triggering of the pharyngeal swallow, decreased hyolaryngeal elevation/excursion, decreased tongue base retraction, decreased pressure generated during the swallow, and decreased vocal fold closure. Penetration and aspiration noted during the swallow of thin and nectar thick " "liquids."    Pt had a MBSS completed 10/19/2016 and revealed: moderate oropharyngeal dysphagia c/b decreased oral motor skills, delayed triggering of the pharyngeal swallow, decreased hyolaryngeal elevation/excursion, decreased tongue base retraction, decreased pressure generated during the swallow, and decreased vocal fold closure. Penetration and aspiration noted during the swallow of thin and nectar thick liquids.   Recommendations/Precautions: 1. Regular soft solids with NECTAR thick liquids via tsp, 2. 1 level tsp at a time; 3. Single bites/sips, 4. Multiple swallows per bite/sip, 5. Seated upright for meals and for 30 min following  Plan:  Results/recommendations discussed with pt and wife s/p evaluation. Handouts provided. Sample thickeners provided. Both verbalized understanding. Discussed changes that need to be made to treatment plan including use of NMES and changing from CTAR to Shaker exercise. Will resume dysphagia therapy upon MD signature on this report with updated goals below.     Per chart review, pt stopped attending OP  s/p MBSS completed 10/2016. Pt reported SLP discharged him from , however, most recent note recommended continued OP ST.     Prior Level of Function: See above  Functional Deficits Leading to Referral:  Pt reports feeling a increase in swallowing difficulties the past month. Pt also reports intermittent change in vocal quality (not when eating/drinking) and occasional loss of voice. ENT evaluated pt's vocal chords and determined chords to be WFL. Pt reports a h/o GERD and taking medications, however, stopped taking medications "a while ago" because he felt he did not need it. Pt reports plan for a stress test with cardiologist. Reports no recent episodes of pneumonia.   Social Cultural:  Pt arrived to MBSS with wife.  Nutrition:  Previous MBSS recommended NT liquids and regular diet. Pt did not directly answer what he has been eating/drinking prior to noted increased " diffiuclty. Pt's wife reported pt has been thickening liquids to HONEY thick since noted increased difficulty.   Signs of Abuse:  No  Medication:    Current Outpatient Prescriptions on File Prior to Encounter   Medication Sig Dispense Refill    ascorbic acid (VITAMIN C) 1000 MG tablet Take 1,000 mg by mouth once daily.        cetirizine (ZYRTEC) 10 MG tablet Take 1 tablet (10 mg total) by mouth every evening. 90 tablet 0    hyoscyamine (ANASPAZ,LEVSIN) 0.125 mg Tab Take 1 tablet (125 mcg total) by mouth every 4 (four) hours as needed. 30 tablet 0    loratadine (CLARITIN) 10 mg tablet Take 1 tablet (10 mg total) by mouth daily as needed for Allergies (or runny nose). 30 tablet 0    omega 3-dha-epa-fish oil 1,000 (120-180) mg Cap Take 1 capsule by mouth once daily.        omeprazole 20 mg TbEC 1/2 tablet daily in am as needed. 45 each 1    pravastatin (PRAVACHOL) 10 MG tablet TAKE ONE TABLET BY MOUTH ONCE DAILY 90 tablet 1     Current Facility-Administered Medications on File Prior to Encounter   Medication Dose Route Frequency Provider Last Rate Last Dose    0.9%  NaCl infusion   Intravenous 1 time in Clinic/HOD Owen Cotton NP         Alertness/Attention:  WNL  Oral Motor:    Oral Musculature: WFL for swallowing  Structure Abnormalities: none noted  Dentition: present and adequate  Secretion Management: no difficulties managing secretions  Mucosal Quality: adequate  Mandibular Strength and Mobility: WFL  Oral Labial Strength and Mobility: WFL  Lingual Strength and Mobility: mildly impaired strength  Velar Elevation: WFL  Buccal Strength and Mobility: WFL  Volitional Cough: elicited; strong  Volitional Swallow: timely  Voice Prior to PO Intake: clear  Oral Musculature Comments: No significant oral motor impairments    Patient Position:  Sitting  View:  Lateral  Presentations:    THIN:- 5cc, x2; one with a chin tuck  NECTAR:- 5cc x3 (2 with chin tuck); cup sips x5 (4 with chin tuck)  HONEY: tsp trials  x4  PUREE:- self fed tsp bites of pudding with barium paste x2  DENTAL SOFT:- self fed tsp bite of banana with barium paste x1  SOLID:- 1 inch bite of james cracker with barium paste x1    Oral Phase: Mildly decreased lingual strength resulting in trace-mild lingual residue after the swallow falling along BOT.     Pharyngeal Phase: Decreased hyolaryngeal elevation/excursion and vocal chord closure resulted in penetration and SILENT transient aspiration during the swallow of 5cc thin liquid barium. Chin tuck did not aid in preventing penetration. Pt presented with consistent flash mild penetration with tsp and self regulated cup sips of nectar thick liquids. No aspiration noted with nectar thick liquids. Spontaneous delayed cough noted likley from transient aspiration noted with thin liquids. No penetration or aspiration noted with tsp or 10cc amounts of honey thick liquids, puree, dental soft, or solid trials with head in neutral position. Decreased tongue base retraction and incomplete epiglottic inversion resulted in mild- moderate vallecular residue with thin liquids and nectar thick liquids. Vallecular residue decreased to trace amounts with spontaneous multiple swallows (2-3 per bites/sips). No significant piriform sinus residue noted. Decreasing/regulating volume of nectar thick liquids to tsp or 10cc amounts, utilizing chin tuck posturing, and performing multiple swallows were effective in improving swallow safety.     Esophageal Phase: Pt presented with no significant esophageal dysphagia. Adequate UES opening. No esophageal backflow noted.     Strategies/Compensatory Techniques Utilized:  Chin tuck, small bolus, multiple swallows    Impressions:  Pt presents with overall improved swallowing function compared to previous NGUYỄN completed in 2016, however, continued aspiration noted with thin liquids. Pt presents with moderate pharyngeal dysphagia c/b decreased hyolaryngeal elevation/excursion, decreased tongue  base retraction, decreased pressure generated during the swallow, and decreased vocal fold closure. Penetration and aspiration noted during the swallow of thin liquids. Flash penetration noted with nectar thick liquids.     Recommendations/Precautions:   1. Regular diet  2. NECTAR THICK LIQUIDS: with chin tuck posturing and small tsp bites or cup sips of HONEY thick liquids with neutral head positioning pending pt's preference  3. Strict aspiration precautions including: Single bites/sips, Multiple swallows per bite/sip, Seated upright for meals and for 30 min following  4. Continue to monitor for signs and symptoms of aspiration and discontinue oral feeding should you notice any of the following: watery eyes, reddened facial area, wet vocal quality, increased work of breathing, change in respiratory status, increased congestion, coughing, fever, etc.  5. Participate in OP ST for 4-6 weeks to ensure compliance with safe swallowing precautions and re-implement dysphagia therapy exercises.   6. Re-peat MBSS to assess progress s/p OP ST as warranted per treating SLP.     Plan: Re-initiate OP ST 2-3 times a week for 4-6 weeks. Short and long term goals to be determined per treating SLP.     Education: Pt and pt's wife educated on MBSS results, SLP recommendations, mild risk of aspiration with NT liquids, anatomy of swallowing, and importance of following safe swallowing precautions. Pt and pt's wife verbalized understanding of all discussed.     MIGUEL Rushing., CCC-SLP  [phone]  08/24/2017      I CERTIFY THE NEED FOR THESE SERVICES FURNISHED UNDER THIS PLAN OF TREATMENT AND WHILE UNDER MY CARE    Physician's comments: ________________________________________________________________________________________________________________________________________________      Physician's Name: ___________________________________

## 2017-08-28 DIAGNOSIS — R13.12 DYSPHAGIA, OROPHARYNGEAL: Primary | ICD-10-CM

## 2017-08-31 ENCOUNTER — HOSPITAL ENCOUNTER (OUTPATIENT)
Dept: CARDIOLOGY | Facility: HOSPITAL | Age: 77
Discharge: HOME | End: 2017-08-31
Attending: INTERNAL MEDICINE
Payer: MEDICARE

## 2017-08-31 DIAGNOSIS — E11.40 TYPE 2 DIABETES, CONTROLLED, WITH NEUROPATHY: ICD-10-CM

## 2017-08-31 DIAGNOSIS — I10 ESSENTIAL HYPERTENSION: ICD-10-CM

## 2017-08-31 DIAGNOSIS — E78.5 HYPERLIPIDEMIA LDL GOAL <100: ICD-10-CM

## 2017-08-31 LAB
DIASTOLIC DYSFUNCTION: NO
ESTIMATED PA SYSTOLIC PRESSURE: 18.32
RETIRED EF AND QEF - SEE NOTES: 55 (ref 55–65)
TRICUSPID VALVE REGURGITATION: NORMAL

## 2017-08-31 PROCEDURE — 93320 DOPPLER ECHO COMPLETE: CPT | Mod: 26,,, | Performed by: INTERNAL MEDICINE

## 2017-08-31 PROCEDURE — 93325 DOPPLER ECHO COLOR FLOW MAPG: CPT

## 2017-08-31 PROCEDURE — 93325 DOPPLER ECHO COLOR FLOW MAPG: CPT | Mod: 26,,, | Performed by: INTERNAL MEDICINE

## 2017-08-31 PROCEDURE — 93351 STRESS TTE COMPLETE: CPT | Mod: 26,,, | Performed by: INTERNAL MEDICINE

## 2017-08-31 PROCEDURE — 93320 DOPPLER ECHO COMPLETE: CPT

## 2017-09-01 ENCOUNTER — CLINICAL SUPPORT (OUTPATIENT)
Dept: REHABILITATION | Facility: HOSPITAL | Age: 77
End: 2017-09-01
Attending: OTOLARYNGOLOGY
Payer: MEDICARE

## 2017-09-01 DIAGNOSIS — R13.12 DYSPHAGIA, OROPHARYNGEAL: Primary | ICD-10-CM

## 2017-09-01 PROCEDURE — G8996 SWALLOW CURRENT STATUS: HCPCS | Mod: CJ,PN

## 2017-09-01 PROCEDURE — 92610 EVALUATE SWALLOWING FUNCTION: CPT | Mod: PN

## 2017-09-01 PROCEDURE — G8997 SWALLOW GOAL STATUS: HCPCS | Mod: CI,PN

## 2017-09-01 NOTE — PROGRESS NOTES
"Date: 09/01/2017    Start Time:  815  Stop Time:  855      OUTPATIENT NEUROLOGICAL REHABILITATION  SPEECH THERAPY EVALUATION    Subjective/History  Onset Date:  June 2016  Primary Diagnosis:  Dysphagia  Treatment Diagnosis:  Oropharyngeal Dysphagia  Referring Provider:  Dr. Donovan Garcia  Orders: ST for evaluation and treat  Current Medical History: Diego Gunter   presents to the [facility] secondary to the diagnosis of dysphagia. Mr. Gunter initially presented with trouble swallowing 2/2 dysphagia 6/08/2016.     He first participated in a modified barium swallow study (MBSS) 5/27/16 which indicated "Modified Barium Swallow Study completed.  Moderate oropharyngeal dysphagia c/b globus sensation;delayed pharyngeal swallow;premature spillage;multiple spontaneous swallows;immediate coughing/choking;vallecular pooling;vallecular stasis.  Pt with increasing vallecular stasis with thicker consistencies.  Globus sensation consistent with vallecular stasis.  Pt coughing throughout all trials likely due to globus sensation. No aspiration noted. Penetration noted.  No blockage/obstruction.  Pt with difficulty clearing vallecular stasis; max effort; chin tuck; multiple swallows; effortful swallow.    ST RECS: regular and nectar thickened liquids. Strict aspiration precautions: upright for meals; small bites/sips; chin tuck; multiple swallows, alternate bites/sips; effortful swallow."      In June 2016, Mr. Gunter participated in an OP ST evaluation where he was referred to neurology to r/o neurological onset of dysphagia (i.e. Possible PD diagnosis per family history). Per patient report and chart review, neurological onset of swallow as ruled out.     Repeat MBSS completed 6/24/16 revealed "moderate oropharyngeal dysphagia c/b decreased oral motor skills, decreased hyolaryngeal elevation/excursion, decreased tongue base retraction, decreased pharyngeal constriction, " "and decreased vocal fold closure. Penetration and aspiration noted during the swallow of thin and nectar thick liquids."    Repeat MBSS completed 8/19/16 indicated "Pt presents with moderate oropharyngeal dysphagia c/b decreased oral motor skills, delayed triggering of the pharyngeal swallow, decreased hyolaryngeal elevation/excursion, decreased tongue base retraction, decreased pressure generated during the swallow, and decreased vocal fold closure. Penetration and aspiration noted during the swallow of thin and nectar thick liquids"    Repeat MBSS completed 10/19/16 indicated "Pt presents with moderate oropharyngeal dysphagia c/b decreased oral motor skills, delayed triggering of the pharyngeal swallow, decreased hyolaryngeal elevation/excursion, decreased tongue base retraction, decreased pressure generated during the swallow, and decreased vocal fold closure. Penetration and aspiration noted during the swallow of thin and nectar thick liquids. "    Patient did not return to therapy as recommended following 10/19/16 MBSS. He presented to his PCP with complaints of dysphagia on 8/14/17 where re-evaluation by ST was recommended. Pt participated in a MBSS on 8/31/17 which indicated "Pt presents with overall improved swallowing function compared to previous NGUYỄN completed in 2016, however, continued aspiration noted with thin liquids. Pt presents with moderate pharyngeal dysphagia c/b decreased hyolaryngeal elevation/excursion, decreased tongue base retraction, decreased pressure generated during the swallow, and decreased vocal fold closure. Penetration and aspiration noted during the swallow of thin liquids. Flash penetration noted with nectar thick liquids. "      Past Medical History:   Past Medical History:   Diagnosis Date    Hx of colonic polyps     followed by GI. Dr. Pham    Hyperlipidemia LDL goal <100     Overweight(278.02)     Prostate cancer september 2011    followed by urology, Dr. Rogers    " Type II or unspecified type diabetes mellitus without mention of complication, not stated as uncontrolled     diet controlled     Precautions:  aspiration  Prior Therapy:  ST from 6/24/17 to 10/19/17  Pain:   0/10  Nutrition:  Regular diet, NECTAR thick liquids with a chin tuck or HONEY thick liquids in a neutral position   Environmental Concerns/Cultural/Spiritual/Developmental/Educational Needs: none  Prior Level of Function: Independent  Social History:  Mr. Gunter lives at home with his wife. He reports that he is retired.   Signs of Abuse: No  Functional Deficits Leading to Referral/Nature of Injury:  Coughing while eating  Patient Therapy Goals:  To get better     Objective   Auditory Comprehension: no concerns reported. Will monitor and assess as necessary.    Reading Comprehension: no concerns reported. Will monitor and assess as necessary.     Verbal Expression: no concerns reported. Will monitor and assess as necessary.     Written Expression: no concerns reported. Will monitor and assess as necessary.     Cognition: no concerns reported. Will monitor and assess as necessary.     Motor Speech/Fluency/Voice:  Mr. Samuel fluency was considered WFL. He exhibited a slightly hypernasal vocal quality. He reports that he has experienced no significant voice changes in the past year. He exhibited dysarthritic speech c/b imprecise production of back consonants. He spoke with 100% intelligibility.     Oral motor exam revealed:  Labial, lingual, palatal, and buccal strength and ROM were considered WFL. No deviations observed upon protrusion, retraction, lateralization, or in speech.    Diadochokinetic (DDK) rates for rapid repetition of 1 - 3 syllable utterances were WNL.     Swallowing: Clinical Swallow Study:   A clinical swallow study was performed to assess functionality of the pt's swallow. Mr. Gunter reported that he had been doing his dysphagia exercises as assigned by previous SLP since his MBSS and  "wanted to trial thin liquids. Mr. Gunter was presented with:  Presentation S/s of aspiration description   Tsp thin liquid Yes Coughing during and after the swallow   Tsp nectar thick liquid with chin tuck No Spontaneous dry swallows observed x2   2 tsp nectar thick liquid with chin tuck No No overt s/s of aspiration   Self regulated nectar thick liquid with chin tuck via cup No No overt s/s of aspiration   Tsp puree No No overt s/s of aspiration   solid No No overt s/s of aspiration     Oral Phase: WFL; no anterior spillage, pocketing, or oral residue observed across trials.   Pharyngeal Phase: Mr. Gunter continues to present with dysphagia c/b s/s of aspiration during the swallow with thin liquids. No s/s of aspiration observed with nectar thick liquids with chin tuck, puree, or solids.      MBSS on 8/24/17   "Impressions:  Pt presents with overall improved swallowing function compared to previous NGUYỄN completed in 2016, however, continued aspiration noted with thin liquids. Pt presents with moderate pharyngeal dysphagia c/b decreased hyolaryngeal elevation/excursion, decreased tongue base retraction, decreased pressure generated during the swallow, and decreased vocal fold closure. Penetration and aspiration noted during the swallow of thin liquids. Flash penetration noted with nectar thick liquids.      Recommendations/Precautions:   1. Regular diet  2. NECTAR THICK LIQUIDS: with chin tuck posturing and small tsp bites or cup sips of HONEY thick liquids with neutral head positioning pending pt's preference  3. Strict aspiration precautions including: Single bites/sips, Multiple swallows per bite/sip, Seated upright for meals and for 30 min following  4. Continue to monitor for signs and symptoms of aspiration and discontinue oral feeding should you notice any of the following: watery eyes, reddened facial area, wet vocal quality, increased work of breathing, change in respiratory status, increased congestion, " "coughing, fever, etc.  5. Participate in OP ST for 4-6 weeks to ensure compliance with safe swallowing precautions and re-implement dysphagia therapy exercises.   6. Re-peat MBSS to assess progress s/p OP ST as warranted per treating SLP. "    Hearing: no concerns reported. Will monitor and assess as necessary.    Assessment/Impressions    Mr. Gunter presents to [facility] 2/2 dysphagia. He presents with moderate pharyngeal dysphagia c/b decreased hyolaryngeal elevation / excursion, decreased tongue base retraction, decreased pressure generated during the swallow, and decreased vocal fold closure. Penetration and aspiration observed with thin liquids. Flash penetration observed with nectar thick liquids. Clinical swallow assessment indicated similar results to most recent MBSS. Patient will benefit from outpatient neurological rehabilitation speech therapy.    Functional Communication Measure (FCM):   Severity Modifier for Medicare G-Code:  Swallowing  Current status:  FCM:  Level 5  - CJ  Projected status:  FCM:   Level 6  - CI          Rehab Potential: fair    Short Term Goals (4 weeks):   1. Pt will perform oral motor exercises to improve labial and lingual strength x40 provided tongue blade resistance for 5 seconds with supervision during therapy session and as part of home program.  2. Pt will perform dysphagia exercises x40 targeting hyolaryngeal elevation with supervision.  3. Pt will perform dysphagia exercises x40 targeting tongue base retraction with supervision.  4. Pt will perform dysphagia exercises x40 targeting vocal adduction with supervision.  5. Pt will perform effortful swallows x40 during consuming tsp sips of nectar thick liquids without any over clinical s/s of aspiration on 8/10 trials.  6. Pt will perform Shaker exercise (sustained for 60 seconds x3, repetitive x30 x3) with supervision during therapy session or as part of home program.  7. Pt will " participate in home exercise program 5 times per day.      Long Term Goals (8 weeks):   1. Pt will maintain adequate hydration/nutrition with optimum safety and efficiency of swallowing function on PO intake without overt s/s of aspiration for the highest appropriate diet level.   2. Pt will utilize compensatory strategies with optimum safety and efficiency of swallowing function on PO intake without overt s/s of aspiration for the highest diet level.      Education: POC was discussed with pt. Patient and family members expressed understanding.     Plan  Certification Period: 8/28/17 to 10/28/17  Plan of Care Certification Period: 9/01/17 to 10/27/17    Recommended Treatment Plan:  Patient will participate in the [facility] program for speech therapy 2-3 times per week to address his  Swallow deficits, to educate patient and their family, and to participate in a home exercise program.    Other Recommendations: none at this time       Therapist's Name: BRAEDEN Edmondson, CCC-SLP     Date: 09/01/2017    I certify the need for these services furnished under this plan of treatment and while under my care.  ____________________________________ Physician/Referring Practitioner   Date of Signature

## 2017-09-05 NOTE — PLAN OF CARE
"Date: 09/01/2017    Start Time:  815  Stop Time:  855      OUTPATIENT NEUROLOGICAL REHABILITATION  SPEECH THERAPY EVALUATION    Subjective/History  Onset Date:  June 2016  Primary Diagnosis:  Dysphagia  Treatment Diagnosis:  Oropharyngeal Dysphagia  Referring Provider:  Dr. Donovan Garcia  Orders: ST for evaluation and treat  Current Medical History: Diego Gunter   presents to the [facility] secondary to the diagnosis of dysphagia. Mr. Gunter initially presented with trouble swallowing 2/2 dysphagia 6/08/2016.     He first participated in a modified barium swallow study (MBSS) 5/27/16 which indicated "Modified Barium Swallow Study completed.  Moderate oropharyngeal dysphagia c/b globus sensation;delayed pharyngeal swallow;premature spillage;multiple spontaneous swallows;immediate coughing/choking;vallecular pooling;vallecular stasis.  Pt with increasing vallecular stasis with thicker consistencies.  Globus sensation consistent with vallecular stasis.  Pt coughing throughout all trials likely due to globus sensation. No aspiration noted. Penetration noted.  No blockage/obstruction.  Pt with difficulty clearing vallecular stasis; max effort; chin tuck; multiple swallows; effortful swallow.    ST RECS: regular and nectar thickened liquids. Strict aspiration precautions: upright for meals; small bites/sips; chin tuck; multiple swallows, alternate bites/sips; effortful swallow."      In June 2016, Mr. Gunter participated in an OP ST evaluation where he was referred to neurology to r/o neurological onset of dysphagia (i.e. Possible PD diagnosis per family history). Per patient report and chart review, neurological onset of swallow as ruled out.     Repeat MBSS completed 6/24/16 revealed "moderate oropharyngeal dysphagia c/b decreased oral motor skills, decreased hyolaryngeal elevation/excursion, decreased tongue base retraction, decreased pharyngeal constriction, " "and decreased vocal fold closure. Penetration and aspiration noted during the swallow of thin and nectar thick liquids."    Repeat MBSS completed 8/19/16 indicated "Pt presents with moderate oropharyngeal dysphagia c/b decreased oral motor skills, delayed triggering of the pharyngeal swallow, decreased hyolaryngeal elevation/excursion, decreased tongue base retraction, decreased pressure generated during the swallow, and decreased vocal fold closure. Penetration and aspiration noted during the swallow of thin and nectar thick liquids"    Repeat MBSS completed 10/19/16 indicated "Pt presents with moderate oropharyngeal dysphagia c/b decreased oral motor skills, delayed triggering of the pharyngeal swallow, decreased hyolaryngeal elevation/excursion, decreased tongue base retraction, decreased pressure generated during the swallow, and decreased vocal fold closure. Penetration and aspiration noted during the swallow of thin and nectar thick liquids. "    Patient did not return to therapy as recommended following 10/19/16 MBSS. He presented to his PCP with complaints of dysphagia on 8/14/17 where re-evaluation by ST was recommended. Pt participated in a MBSS on 8/31/17 which indicated "Pt presents with overall improved swallowing function compared to previous NGUYỄN completed in 2016, however, continued aspiration noted with thin liquids. Pt presents with moderate pharyngeal dysphagia c/b decreased hyolaryngeal elevation/excursion, decreased tongue base retraction, decreased pressure generated during the swallow, and decreased vocal fold closure. Penetration and aspiration noted during the swallow of thin liquids. Flash penetration noted with nectar thick liquids. "      Past Medical History:   Past Medical History:   Diagnosis Date    Hx of colonic polyps     followed by GI. Dr. Pham    Hyperlipidemia LDL goal <100     Overweight(278.02)     Prostate cancer september 2011    followed by urology, Dr. Rogers    " Type II or unspecified type diabetes mellitus without mention of complication, not stated as uncontrolled     diet controlled     Precautions:  aspiration  Prior Therapy:  ST from 6/24/17 to 10/19/17  Pain:   0/10  Nutrition:  Regular diet, NECTAR thick liquids with a chin tuck or HONEY thick liquids in a neutral position   Environmental Concerns/Cultural/Spiritual/Developmental/Educational Needs: none  Prior Level of Function: Independent  Social History:  Mr. Gunter lives at home with his wife. He reports that he is retired.   Signs of Abuse: No  Functional Deficits Leading to Referral/Nature of Injury:  Coughing while eating  Patient Therapy Goals:  To get better     Objective   Auditory Comprehension: no concerns reported. Will monitor and assess as necessary.    Reading Comprehension: no concerns reported. Will monitor and assess as necessary.     Verbal Expression: no concerns reported. Will monitor and assess as necessary.     Written Expression: no concerns reported. Will monitor and assess as necessary.     Cognition: no concerns reported. Will monitor and assess as necessary.     Motor Speech/Fluency/Voice:  Mr. Samuel fluency was considered WFL. He exhibited a slightly hypernasal vocal quality. He reports that he has experienced no significant voice changes in the past year. He exhibited dysarthritic speech c/b imprecise production of back consonants. He spoke with 100% intelligibility.     Oral motor exam revealed:  Labial, lingual, palatal, and buccal strength and ROM were considered WFL. No deviations observed upon protrusion, retraction, lateralization, or in speech.    Diadochokinetic (DDK) rates for rapid repetition of 1 - 3 syllable utterances were WNL.     Swallowing: Clinical Swallow Study:   A clinical swallow study was performed to assess functionality of the pt's swallow. Mr. Gunter reported that he had been doing his dysphagia exercises as assigned by previous SLP since his MBSS and  "wanted to trial thin liquids. Mr. Gunter was presented with:  Presentation S/s of aspiration description   Tsp thin liquid Yes Coughing during and after the swallow   Tsp nectar thick liquid with chin tuck No Spontaneous dry swallows observed x2   2 tsp nectar thick liquid with chin tuck No No overt s/s of aspiration   Self regulated nectar thick liquid with chin tuck via cup No No overt s/s of aspiration   Tsp puree No No overt s/s of aspiration   solid No No overt s/s of aspiration     Oral Phase: WFL; no anterior spillage, pocketing, or oral residue observed across trials.   Pharyngeal Phase: Mr. Gunter continues to present with dysphagia c/b s/s of aspiration during the swallow with thin liquids. No s/s of aspiration observed with nectar thick liquids with chin tuck, puree, or solids.      MBSS on 8/24/17   "Impressions:  Pt presents with overall improved swallowing function compared to previous NGUYỄN completed in 2016, however, continued aspiration noted with thin liquids. Pt presents with moderate pharyngeal dysphagia c/b decreased hyolaryngeal elevation/excursion, decreased tongue base retraction, decreased pressure generated during the swallow, and decreased vocal fold closure. Penetration and aspiration noted during the swallow of thin liquids. Flash penetration noted with nectar thick liquids.      Recommendations/Precautions:   1. Regular diet  2. NECTAR THICK LIQUIDS: with chin tuck posturing and small tsp bites or cup sips of HONEY thick liquids with neutral head positioning pending pt's preference  3. Strict aspiration precautions including: Single bites/sips, Multiple swallows per bite/sip, Seated upright for meals and for 30 min following  4. Continue to monitor for signs and symptoms of aspiration and discontinue oral feeding should you notice any of the following: watery eyes, reddened facial area, wet vocal quality, increased work of breathing, change in respiratory status, increased congestion, " "coughing, fever, etc.  5. Participate in OP ST for 4-6 weeks to ensure compliance with safe swallowing precautions and re-implement dysphagia therapy exercises.   6. Re-peat MBSS to assess progress s/p OP ST as warranted per treating SLP. "    Hearing: no concerns reported. Will monitor and assess as necessary.    Assessment/Impressions    Mr. Gunter presents to [facility] 2/2 dysphagia. He presents with moderate pharyngeal dysphagia c/b decreased hyolaryngeal elevation / excursion, decreased tongue base retraction, decreased pressure generated during the swallow, and decreased vocal fold closure. Penetration and aspiration observed with thin liquids. Flash penetration observed with nectar thick liquids. Clinical swallow assessment indicated similar results to most recent MBSS. Patient will benefit from outpatient neurological rehabilitation speech therapy.    Functional Communication Measure (FCM):   Severity Modifier for Medicare G-Code:  Swallowing  Current status:  FCM:  Level 5  - CJ  Projected status:  FCM:   Level 6  - CI          Rehab Potential: fair    Short Term Goals (4 weeks):   1. Pt will perform oral motor exercises to improve labial and lingual strength x40 provided tongue blade resistance for 5 seconds with supervision during therapy session and as part of home program.  2. Pt will perform dysphagia exercises x40 targeting hyolaryngeal elevation with supervision.  3. Pt will perform dysphagia exercises x40 targeting tongue base retraction with supervision.  4. Pt will perform dysphagia exercises x40 targeting vocal adduction with supervision.  5. Pt will perform effortful swallows x40 during consuming tsp sips of nectar thick liquids without any over clinical s/s of aspiration on 8/10 trials.  6. Pt will perform Shaker exercise (sustained for 60 seconds x3, repetitive x30 x3) with supervision during therapy session or as part of home program.  7. Pt will " participate in home exercise program 5 times per day.      Long Term Goals (8 weeks):   1. Pt will maintain adequate hydration/nutrition with optimum safety and efficiency of swallowing function on PO intake without overt s/s of aspiration for the highest appropriate diet level.   2. Pt will utilize compensatory strategies with optimum safety and efficiency of swallowing function on PO intake without overt s/s of aspiration for the highest diet level.      Education: POC was discussed with pt. Patient and family members expressed understanding.     Plan  Certification Period: 8/28/17 to 10/28/17  Plan of Care Certification Period: 9/01/17 to 10/27/17    Recommended Treatment Plan:  Patient will participate in the [facility] program for speech therapy 2-3 times per week to address his  Swallow deficits, to educate patient and their family, and to participate in a home exercise program.    Other Recommendations: none at this time       Therapist's Name: BRAEDEN Edmondson, CCC-SLP     Date: 09/01/2017    I certify the need for these services furnished under this plan of treatment and while under my care.  ____________________________________ Physician/Referring Practitioner   Date of Signature

## 2017-09-06 DIAGNOSIS — E78.5 HYPERLIPIDEMIA LDL GOAL <100: ICD-10-CM

## 2017-09-07 RX ORDER — PRAVASTATIN SODIUM 10 MG/1
TABLET ORAL
Qty: 90 TABLET | Refills: 1 | Status: SHIPPED | OUTPATIENT
Start: 2017-09-07 | End: 2018-03-17 | Stop reason: SDUPTHER

## 2017-09-11 ENCOUNTER — OFFICE VISIT (OUTPATIENT)
Dept: CARDIOLOGY | Facility: CLINIC | Age: 77
End: 2017-09-11
Payer: MEDICARE

## 2017-09-11 VITALS
BODY MASS INDEX: 24.59 KG/M2 | DIASTOLIC BLOOD PRESSURE: 74 MMHG | HEIGHT: 69 IN | SYSTOLIC BLOOD PRESSURE: 112 MMHG | HEART RATE: 58 BPM | RESPIRATION RATE: 96 BRPM | WEIGHT: 166 LBS

## 2017-09-11 DIAGNOSIS — E78.5 HYPERLIPIDEMIA LDL GOAL <100: ICD-10-CM

## 2017-09-11 DIAGNOSIS — E11.40 TYPE 2 DIABETES, CONTROLLED, WITH NEUROPATHY: ICD-10-CM

## 2017-09-11 DIAGNOSIS — I10 ESSENTIAL HYPERTENSION: Primary | ICD-10-CM

## 2017-09-11 PROCEDURE — 3044F HG A1C LEVEL LT 7.0%: CPT | Mod: S$GLB,,, | Performed by: INTERNAL MEDICINE

## 2017-09-11 PROCEDURE — 99213 OFFICE O/P EST LOW 20 MIN: CPT | Mod: S$GLB,,, | Performed by: INTERNAL MEDICINE

## 2017-09-11 PROCEDURE — 3008F BODY MASS INDEX DOCD: CPT | Mod: S$GLB,,, | Performed by: INTERNAL MEDICINE

## 2017-09-11 PROCEDURE — 99999 PR PBB SHADOW E&M-EST. PATIENT-LVL III: CPT | Mod: PBBFAC,,, | Performed by: INTERNAL MEDICINE

## 2017-09-11 PROCEDURE — 1126F AMNT PAIN NOTED NONE PRSNT: CPT | Mod: S$GLB,,, | Performed by: INTERNAL MEDICINE

## 2017-09-11 PROCEDURE — 3078F DIAST BP <80 MM HG: CPT | Mod: S$GLB,,, | Performed by: INTERNAL MEDICINE

## 2017-09-11 PROCEDURE — 1159F MED LIST DOCD IN RCRD: CPT | Mod: S$GLB,,, | Performed by: INTERNAL MEDICINE

## 2017-09-11 PROCEDURE — 3074F SYST BP LT 130 MM HG: CPT | Mod: S$GLB,,, | Performed by: INTERNAL MEDICINE

## 2017-09-11 PROCEDURE — 99499 UNLISTED E&M SERVICE: CPT | Mod: S$PBB,,, | Performed by: INTERNAL MEDICINE

## 2017-09-11 NOTE — PROGRESS NOTES
Subjective:    Patient ID:  Diego Gunter is a 77 y.o. male who presents for follow-up of Results      HPI     HTN, DM, HLD, bradycardia     Referred by Dr Ferreira  Diego Gunter is a 76 y.o. male who presents to the clinic today for Diabetes  .  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  He does not check his blood pressures or blood sugars at home.  He reports good dietary habits.  He exercises regularly.  Unfortunately, his dysphagia is worsening again.  He has an appointment set up in the near future for reevaluation with speech therapy.  He recently had blood work done and is here today to discuss the results.  He does complain of some shortness of breath and fatigue.  He denies cardiac chest pain.  No abdominal pain, temperature intolerance, or unexplained changes in his weight.     Denies CP but gets SOB which has worsened some  SOB is at rest - goes to the gym regularly and does cardio without symptoms  No recent cardiac evaluation    Stress echo 8/31/17    1 - Normal left ventricular systolic function (EF 55-60%).     2 - Trivial tricuspid regurgitation.     3 - Mild pulmonic regurgitation.     No evidence of stress induced myocardial ischemia.     Review of Systems   Constitution: Negative for decreased appetite.   HENT: Negative for ear discharge.    Eyes: Negative for blurred vision.   Endocrine: Negative for polyphagia.   Skin: Negative for nail changes.   Neurological: Negative for aphonia.   Psychiatric/Behavioral: Negative for hallucinations.        Objective:    Physical Exam   Constitutional: He is oriented to person, place, and time. He appears well-developed and well-nourished.   HENT:   Head: Normocephalic and atraumatic.   Eyes: Conjunctivae are normal. Pupils are equal, round, and reactive to light.   Neck: Normal range of motion. Neck supple.   Cardiovascular: Normal rate, normal  heart sounds and intact distal pulses.    Pulmonary/Chest: Effort normal and breath sounds normal.   Abdominal: Soft. Bowel sounds are normal.   Musculoskeletal: Normal range of motion.   Neurological: He is alert and oriented to person, place, and time.   Skin: Skin is warm and dry.         Assessment:       1. Essential hypertension    2. Hyperlipidemia LDL goal <100    3. Type 2 diabetes, controlled, with neuropathy         Plan:       Cardiac stable  OV 6 months

## 2017-09-18 ENCOUNTER — CLINICAL SUPPORT (OUTPATIENT)
Dept: REHABILITATION | Facility: HOSPITAL | Age: 77
End: 2017-09-18
Attending: OTOLARYNGOLOGY
Payer: MEDICARE

## 2017-09-18 DIAGNOSIS — R13.12 DYSPHAGIA, OROPHARYNGEAL: ICD-10-CM

## 2017-09-18 PROCEDURE — 92526 ORAL FUNCTION THERAPY: CPT | Mod: PN

## 2017-09-18 NOTE — PROGRESS NOTES
"OUTPATIENT NEUROLOGICAL REHABILITATION  SPEECH THERAPY PROGRESS NOTE    Date:  09/18/2017    Start Time:  1430  Stop Time:  1515    Subjective/History  Onset Date:  June 2016  Primary Diagnosis:  Dysphagia  Treatment Diagnosis:  Oropharyngeal Dysphagia  Referring Provider:  Dr. Donovan Garcia  Orders: ST for evaluation and treat  Current Medical History: Diego Gunter   presents to the [facility] secondary to the diagnosis of dysphagia. Mr. Gunter initially presented with trouble swallowing 2/2 dysphagia 6/08/2016.     He first participated in a modified barium swallow study (MBSS) 5/27/16 which indicated "Modified Barium Swallow Study completed.  Moderate oropharyngeal dysphagia c/b globus sensation;delayed pharyngeal swallow;premature spillage;multiple spontaneous swallows;immediate coughing/choking;vallecular pooling;vallecular stasis.  Pt with increasing vallecular stasis with thicker consistencies.  Globus sensation consistent with vallecular stasis.  Pt coughing throughout all trials likely due to globus sensation. No aspiration noted. Penetration noted.  No blockage/obstruction.  Pt with difficulty clearing vallecular stasis; max effort; chin tuck; multiple swallows; effortful swallow.    ST RECS: regular and nectar thickened liquids. Strict aspiration precautions: upright for meals; small bites/sips; chin tuck; multiple swallows, alternate bites/sips; effortful swallow."      In June 2016, Mr. Gunter participated in an OP ST evaluation where he was referred to neurology to r/o neurological onset of dysphagia (i.e. Possible PD diagnosis per family history). Per patient report and chart review, neurological onset of swallow as ruled out.     Repeat MBSS completed 6/24/16 revealed "moderate oropharyngeal dysphagia c/b decreased oral motor skills, decreased hyolaryngeal elevation/excursion, decreased tongue base retraction, decreased pharyngeal " "constriction, and decreased vocal fold closure. Penetration and aspiration noted during the swallow of thin and nectar thick liquids."    Repeat MBSS completed 8/19/16 indicated "Pt presents with moderate oropharyngeal dysphagia c/b decreased oral motor skills, delayed triggering of the pharyngeal swallow, decreased hyolaryngeal elevation/excursion, decreased tongue base retraction, decreased pressure generated during the swallow, and decreased vocal fold closure. Penetration and aspiration noted during the swallow of thin and nectar thick liquids"    Repeat MBSS completed 10/19/16 indicated "Pt presents with moderate oropharyngeal dysphagia c/b decreased oral motor skills, delayed triggering of the pharyngeal swallow, decreased hyolaryngeal elevation/excursion, decreased tongue base retraction, decreased pressure generated during the swallow, and decreased vocal fold closure. Penetration and aspiration noted during the swallow of thin and nectar thick liquids. "    Patient did not return to therapy as recommended following 10/19/16 MBSS. He presented to his PCP with complaints of dysphagia on 8/14/17 where re-evaluation by ST was recommended. Pt participated in a MBSS on 8/31/17 which indicated "Pt presents with overall improved swallowing function compared to previous NGUYỄN completed in 2016, however, continued aspiration noted with thin liquids. Pt presents with moderate pharyngeal dysphagia c/b decreased hyolaryngeal elevation/excursion, decreased tongue base retraction, decreased pressure generated during the swallow, and decreased vocal fold closure. Penetration and aspiration noted during the swallow of thin liquids. Flash penetration noted with nectar thick liquids. "  Past Medical History:   Past Medical History:   Diagnosis Date    Hx of colonic polyps     followed by GI. Dr. Pham    Hyperlipidemia LDL goal <100     Overweight(278.02)     Prostate cancer september 2011    followed by urology,  " Ken    Type II or unspecified type diabetes mellitus without mention of complication, not stated as uncontrolled     diet controlled     Precautions:  aspiration        TIMED  Procedure Time Min.    Start:   Stop:       Start:  Stop:     Start:  Stop:     Start:  Stop:          UNTIMED  Procedure Time Min.   Dysphagia Therapy Start:  1430  Stop: 1515  45    Start:  Stop:      Total Minutes: 45  Total Timed Units: 0  Total Untimed Units: 1  Charges Billed/# of units: 1    Visit #: 2  Date of Evaluation: 9/1/17  Plan of Care Expiration:  9/1/17 - 10/27/17   Extended POC:   G-CODE  2/10    eval     update              Progress/Current Status    Subjective:     Pain: 0 /10  Pt was motivated for therapy.    Objective:     Short Term Goals (4 weeks):   1. Pt will perform oral motor exercises to improve labial and lingual strength x40 provided tongue blade resistance for 5 seconds with supervision during therapy session and as part of home program.  2. Pt will perform dysphagia exercises x40 targeting hyolaryngeal elevation with supervision.  3. Pt will perform dysphagia exercises x40 targeting tongue base retraction with supervision.  4. Pt will perform dysphagia exercises x40 targeting vocal adduction with supervision.  5. Pt will perform effortful swallows x40 during consuming tsp sips of nectar thick liquids without any over clinical s/s of aspiration on 8/10 trials.  6. Pt will perform Shaker exercise (sustained for 60 seconds x3, repetitive x30 x3) with supervision during therapy session or as part of home program.  7. Pt will participate in home exercise program 5 times per day.      1. Not formally addressed    2. Not formally addressed    3. Pt completed sierra  x40 with min A. Pt alternated the effortful swallow, sierra, and super supraglottic swallow to complete exercises.  4. Pt completed super supraglottic swallow x40 with min A.   5. Pt the CTAR (chin tuck against resistance) x40 repetitive and x60 seconds  x4 with min A.   6. Pt completed effortful swallow x40 with 1 tsp nectar thick liquids with min A with no overt s/s of aspiration.   7. Home exercise program was reviewed with patient.     Long Term Goals (8 weeks):   1. Pt will maintain adequate hydration/nutrition with optimum safety and efficiency of swallowing function on PO intake without overt s/s of aspiration for the highest appropriate diet level.   2. Pt will utilize compensatory strategies with optimum safety and efficiency of swallowing function on PO intake without overt s/s of aspiration for the highest diet level.   Assessment:   Current goals remain appropriate. Goals to be updated as necessary.     Mr. Gunter presents to [facility] 2/2 dysphagia. He presents with moderate pharyngeal dysphagia c/b decreased hyolaryngeal elevation / excursion, decreased tongue base retraction, decreased pressure generated during the swallow, and decreased vocal fold closure. Penetration and aspiration observed with thin liquids. Flash penetration observed with nectar thick liquids. Clinical swallow assessment indicated similar results to most recent MBSS. Patient will benefit from outpatient neurological rehabilitation speech therapy.    Functional Communication Measure (FCM):   Severity Modifier for Medicare G-Code:  Swallowing  Current status:  FCM:  Level 5  - CJ  Projected status:  FCM:   Level 6  - CI      Rehab Potential: fair    Patient Education/Response:     Proper technique of exercises was reviewed with patient. He verbalized understanding.     Plans and Goals:     Continue POC.     BRAEDEN Edmondson, CCC-SLP  Speech Language Pathologist  9/18/2017

## 2017-09-20 ENCOUNTER — CLINICAL SUPPORT (OUTPATIENT)
Dept: REHABILITATION | Facility: HOSPITAL | Age: 77
End: 2017-09-20
Attending: OTOLARYNGOLOGY
Payer: MEDICARE

## 2017-09-20 DIAGNOSIS — R13.12 DYSPHAGIA, OROPHARYNGEAL: Primary | ICD-10-CM

## 2017-09-20 PROCEDURE — 92526 ORAL FUNCTION THERAPY: CPT | Mod: PN

## 2017-09-20 NOTE — PROGRESS NOTES
"OUTPATIENT NEUROLOGICAL REHABILITATION  SPEECH THERAPY PROGRESS NOTE    Date:  09/20/2017    Start Time:  1410  Stop Time:  1555    Subjective/History  Onset Date:  June 2016  Primary Diagnosis:  Dysphagia  Treatment Diagnosis:  Oropharyngeal Dysphagia  Referring Provider:  Dr. Donovan Garcia  Orders: ST for evaluation and treat  Current Medical History: Diego Gunter   presents to the [facility] secondary to the diagnosis of dysphagia. Mr. Gunter initially presented with trouble swallowing 2/2 dysphagia 6/08/2016.     He first participated in a modified barium swallow study (MBSS) 5/27/16 which indicated "Modified Barium Swallow Study completed.  Moderate oropharyngeal dysphagia c/b globus sensation;delayed pharyngeal swallow;premature spillage;multiple spontaneous swallows;immediate coughing/choking;vallecular pooling;vallecular stasis.  Pt with increasing vallecular stasis with thicker consistencies.  Globus sensation consistent with vallecular stasis.  Pt coughing throughout all trials likely due to globus sensation. No aspiration noted. Penetration noted.  No blockage/obstruction.  Pt with difficulty clearing vallecular stasis; max effort; chin tuck; multiple swallows; effortful swallow.    ST RECS: regular and nectar thickened liquids. Strict aspiration precautions: upright for meals; small bites/sips; chin tuck; multiple swallows, alternate bites/sips; effortful swallow."      In June 2016, Mr. Gunter participated in an OP ST evaluation where he was referred to neurology to r/o neurological onset of dysphagia (i.e. Possible PD diagnosis per family history). Per patient report and chart review, neurological onset of swallow as ruled out.     Repeat MBSS completed 6/24/16 revealed "moderate oropharyngeal dysphagia c/b decreased oral motor skills, decreased hyolaryngeal elevation/excursion, decreased tongue base retraction, decreased pharyngeal " "constriction, and decreased vocal fold closure. Penetration and aspiration noted during the swallow of thin and nectar thick liquids."    Repeat MBSS completed 8/19/16 indicated "Pt presents with moderate oropharyngeal dysphagia c/b decreased oral motor skills, delayed triggering of the pharyngeal swallow, decreased hyolaryngeal elevation/excursion, decreased tongue base retraction, decreased pressure generated during the swallow, and decreased vocal fold closure. Penetration and aspiration noted during the swallow of thin and nectar thick liquids"    Repeat MBSS completed 10/19/16 indicated "Pt presents with moderate oropharyngeal dysphagia c/b decreased oral motor skills, delayed triggering of the pharyngeal swallow, decreased hyolaryngeal elevation/excursion, decreased tongue base retraction, decreased pressure generated during the swallow, and decreased vocal fold closure. Penetration and aspiration noted during the swallow of thin and nectar thick liquids. "    Patient did not return to therapy as recommended following 10/19/16 MBSS. He presented to his PCP with complaints of dysphagia on 8/14/17 where re-evaluation by ST was recommended. Pt participated in a MBSS on 8/31/17 which indicated "Pt presents with overall improved swallowing function compared to previous NGUYỄN completed in 2016, however, continued aspiration noted with thin liquids. Pt presents with moderate pharyngeal dysphagia c/b decreased hyolaryngeal elevation/excursion, decreased tongue base retraction, decreased pressure generated during the swallow, and decreased vocal fold closure. Penetration and aspiration noted during the swallow of thin liquids. Flash penetration noted with nectar thick liquids. "  Past Medical History:   Past Medical History:   Diagnosis Date    Hx of colonic polyps     followed by GI. Dr. Pham    Hyperlipidemia LDL goal <100     Overweight(278.02)     Prostate cancer september 2011    followed by urology,  " Ken    Type II or unspecified type diabetes mellitus without mention of complication, not stated as uncontrolled     diet controlled     Precautions:  aspiration        TIMED  Procedure Time Min.    Start:   Stop:       Start:  Stop:     Start:  Stop:     Start:  Stop:          UNTIMED  Procedure Time Min.   Dysphagia Therapy Start:  1410  Stop: 1555  45    Start:  Stop:      Total Minutes: 45  Total Timed Units: 0  Total Untimed Units: 1  Charges Billed/# of units: 1    Visit #: 3  Date of Evaluation: 9/1/17  Plan of Care Expiration:  9/1/17 - 10/27/17   Extended POC:   G-CODE  3/10    eval     update              Progress/Current Status    Subjective:     Pain: 0 /10  Pt was motivated for therapy. Pt cooperative and pleasant.    Objective:     Short Term Goals (4 weeks):   1. Pt will perform oral motor exercises to improve labial and lingual strength x40 provided tongue blade resistance for 5 seconds with supervision during therapy session and as part of home program.  2. Pt will perform dysphagia exercises x40 targeting hyolaryngeal elevation with supervision.  3. Pt will perform dysphagia exercises x40 targeting tongue base retraction with supervision.  4. Pt will perform dysphagia exercises x40 targeting vocal adduction with supervision.  5. Pt will perform effortful swallows x40 during consuming tsp sips of nectar thick liquids without any over clinical s/s of aspiration on 8/10 trials.  6. Pt will perform Shaker exercise (sustained for 60 seconds x3, repetitive x30 x3) with supervision during therapy session or as part of home program.  7. Pt will participate in home exercise program 5 times per day.      1. Not formally addressed    2. Pt completed Mendelhson x5 with min A.    3. Pt completed sierra  x40 with min A. Pt alternated the effortful swallow, sierra, and super supraglottic swallow to complete exercises.  4. Pt completed super supraglottic swallow x40 with min A.   5. Pt the CTAR (chin tuck  against resistance) x40 repetitive and x60 seconds x4 with min A.   6. Pt completed effortful swallow x40 with 1 tsp nectar thick liquids with min A with no overt s/s of aspiration.   7. Home exercise program was reviewed with patient.     Long Term Goals (8 weeks):   1. Pt will maintain adequate hydration/nutrition with optimum safety and efficiency of swallowing function on PO intake without overt s/s of aspiration for the highest appropriate diet level.   2. Pt will utilize compensatory strategies with optimum safety and efficiency of swallowing function on PO intake without overt s/s of aspiration for the highest diet level.   Assessment:   Current goals remain appropriate. Goals to be updated as necessary.     Mr. Gunter presents to [facility] 2/2 dysphagia. He presents with moderate pharyngeal dysphagia c/b decreased hyolaryngeal elevation / excursion, decreased tongue base retraction, decreased pressure generated during the swallow, and decreased vocal fold closure. Penetration and aspiration observed with thin liquids. Flash penetration observed with nectar thick liquids. Clinical swallow assessment indicated similar results to most recent MBSS. Patient will benefit from outpatient neurological rehabilitation speech therapy.    Functional Communication Measure (FCM):   Severity Modifier for Medicare G-Code:  Swallowing  Current status:  FCM:  Level 5  - CJ  Projected status:  FCM:   Level 6  - CI      Rehab Potential: fair    Patient Education/Response:     Proper technique of exercises was reviewed with patient. He verbalized understanding.     Plans and Goals:     Continue POC.     MIGUEL Vaughn., CCC-SLP  Speech-Language Pathologist  9/20/2017

## 2017-09-22 ENCOUNTER — CLINICAL SUPPORT (OUTPATIENT)
Dept: REHABILITATION | Facility: HOSPITAL | Age: 77
End: 2017-09-22
Attending: OTOLARYNGOLOGY
Payer: MEDICARE

## 2017-09-22 DIAGNOSIS — R13.12 DYSPHAGIA, OROPHARYNGEAL: ICD-10-CM

## 2017-09-22 PROCEDURE — 92526 ORAL FUNCTION THERAPY: CPT | Mod: PN

## 2017-09-22 NOTE — PROGRESS NOTES
OUTPATIENT NEUROLOGICAL REHABILITATION  SPEECH THERAPY PROGRESS NOTE    Date:  09/22/2017    Start Time:  945  Stop Time:  1027    Subjective/History  Onset Date:  June 2016  Primary Diagnosis:  Dysphagia  Treatment Diagnosis:  Oropharyngeal Dysphagia  Referring Provider:  Dr. Donovan Garcia  Orders: ST for evaluation and treat  Current Medical History:  Mr. Gunter presents to the [facility] with moderate pharyngeal dysphagia secondary to the diagnosis of decreased swallow function..     Past Medical History:   Past Medical History:   Diagnosis Date    Hx of colonic polyps     followed by GI. Dr. Pham    Hyperlipidemia LDL goal <100     Overweight(278.02)     Prostate cancer september 2011    followed by urology, Dr. Rogers    Type II or unspecified type diabetes mellitus without mention of complication, not stated as uncontrolled     diet controlled     Precautions:  aspiration          UNTIMED  Procedure Time Min.   Dysphagia Therapy Start:  945  Stop: 1027  42    Start:  Stop:      Total Minutes: 42  Total Timed Units: 0  Total Untimed Units: 1  Charges Billed/# of units: 1    Visit #: 4  Date of Evaluation: 9/1/17  Plan of Care Expiration:  9/1/17 - 10/27/17   Extended POC:   G-CODE  4/10    eval     update              Progress/Current Status    Subjective:     Pain: 0 /10  Pt motivated and cooperative in therapy today.    Objective:     Short Term Goals (4 weeks):   1. Pt will perform oral motor exercises to improve labial and lingual strength x40 provided tongue blade resistance for 5 seconds with supervision during therapy session and as part of home program.  2. Pt will perform dysphagia exercises x40 targeting hyolaryngeal elevation with supervision.  3. Pt will perform dysphagia exercises x40 targeting tongue base retraction with supervision.  4. Pt will perform dysphagia exercises x40 targeting vocal adduction with supervision.  5. Pt will  perform effortful swallows x40 during consuming tsp sips of nectar thick liquids without any over clinical s/s of aspiration on 8/10 trials.  6. Pt will perform Shaker exercise (sustained for 60 seconds x3, repetitive x30 x3) with supervision during therapy session or as part of home program.  7. Pt will participate in home exercise program 5 times per day.      1. Not formally addressed    2. Pt completed Mendelhson x45 with min A.    3. Pt completed sierra  X 45 with min A.   4. Pt completed super supraglottic swallow x 45 with min A.   5. Pt the CTAR (chin tuck against resistance) x 45 repetitive and x60 seconds x4 IND'ly.   6. Pt completed effortful swallow x45 with 1 tsp nectar thick liquids with min A with no overt s/s of aspiration.   7. Home exercise program was reviewed with patient.     Long Term Goals (8 weeks):   1. Pt will maintain adequate hydration/nutrition with optimum safety and efficiency of swallowing function on PO intake without overt s/s of aspiration for the highest appropriate diet level.   2. Pt will utilize compensatory strategies with optimum safety and efficiency of swallowing function on PO intake without overt s/s of aspiration for the highest diet level.     Assessment:   Current goals remain appropriate. Goals to be updated as necessary.     Mr. Gunter presents with moderate impaired swallowing skills. He presents with pharyngeal dysphagia. Pt would benefit from continued skilled ST. Prognosis for improvement remains Fair     Functional Communication Measure (FCM):   Severity Modifier for Medicare G-Code:  Swallowing  Current status:  FCM:  Level 5  - CJ  Projected status:  FCM:   Level 6  - CI    Patient Education/Response:     Proper technique of dysphagia exercises reviewed with patient. He verbalized understanding.     Plans and Goals:     Continue POC.    BRAEDEN Edmondson, CCC-SLP  Speech Language Pathologist  9/22/2017

## 2017-09-29 ENCOUNTER — CLINICAL SUPPORT (OUTPATIENT)
Dept: REHABILITATION | Facility: HOSPITAL | Age: 77
End: 2017-09-29
Attending: OTOLARYNGOLOGY
Payer: MEDICARE

## 2017-09-29 DIAGNOSIS — R13.12 DYSPHAGIA, OROPHARYNGEAL: ICD-10-CM

## 2017-09-29 PROCEDURE — 92526 ORAL FUNCTION THERAPY: CPT | Mod: PN

## 2017-09-29 NOTE — PROGRESS NOTES
OUTPATIENT NEUROLOGICAL REHABILITATION  SPEECH THERAPY PROGRESS NOTE    Date:  09/29/2017    Start Time:  945  Stop Time:  1027    Subjective/History  Onset Date:  June 2016  Primary Diagnosis:  Dysphagia  Treatment Diagnosis:  Oropharyngeal Dysphagia  Referring Provider:  Dr. Donovan Garcia  Orders: ST for evaluation and treat  Current Medical History:  Mr. Gunter presents to the [facility] with moderate pharyngeal dysphagia secondary to the diagnosis of decreased swallow function..     Past Medical History:   Past Medical History:   Diagnosis Date    Hx of colonic polyps     followed by GI. Dr. Pham    Hyperlipidemia LDL goal <100     Overweight(278.02)     Prostate cancer september 2011    followed by urology, Dr. Rogers    Type II or unspecified type diabetes mellitus without mention of complication, not stated as uncontrolled     diet controlled     Precautions:  aspiration          UNTIMED  Procedure Time Min.   Dysphagia Therapy Start:  0855  Stop: 0940  45    Start:  Stop:      Total Minutes: 45  Total Timed Units: 0  Total Untimed Units: 1  Charges Billed/# of units: 1    Visit #: 5  Date of Evaluation: 9/1/17  Plan of Care Expiration:  9/1/17 - 10/27/17   Extended POC:   G-CODE  5/10    eval     update              Progress/Current Status    Subjective:     Pain: 0 /10  Pt motivated and cooperative in therapy today.    Objective:     Short Term Goals (4 weeks):   1. Pt will perform oral motor exercises to improve labial and lingual strength x40 provided tongue blade resistance for 5 seconds with supervision during therapy session and as part of home program.  2. Pt will perform dysphagia exercises x40 targeting hyolaryngeal elevation with supervision.  3. Pt will perform dysphagia exercises x40 targeting tongue base retraction with supervision.  4. Pt will perform dysphagia exercises x40 targeting vocal adduction with supervision.  5. Pt will  perform effortful swallows x40 during consuming tsp sips of nectar thick liquids without any over clinical s/s of aspiration on 8/10 trials.  6. Pt will perform Shaker exercise (sustained for 60 seconds x3, repetitive x30 x3) with supervision during therapy session or as part of home program.  7. Pt will participate in home exercise program 5 times per day.      1. Pt completed pitch glides x45 with supervision    2. Pt completed Mendelhson x45 with min A.    3. Pt completed sierra  X 45 with min A.   4. Pt completed super supraglottic swallow x 45 with min A.   5. Pt the CTAR (chin tuck against resistance) x 45 repetitive and x60 seconds x3 IND'ly.   6. Pt completed effortful swallow x45 with 1 tsp nectar thick liquids with min A with no overt s/s of aspiration.   7. Home exercise program was reviewed with patient. Pt reported completing home program 2x a day this week. Pt knows it should be 3-5x, however, stated a lot has been going on this past week.    Long Term Goals (8 weeks):   1. Pt will maintain adequate hydration/nutrition with optimum safety and efficiency of swallowing function on PO intake without overt s/s of aspiration for the highest appropriate diet level.   2. Pt will utilize compensatory strategies with optimum safety and efficiency of swallowing function on PO intake without overt s/s of aspiration for the highest diet level.     Assessment:   Current goals remain appropriate. Goals to be updated as necessary.     Mr. Gunter presents with moderate impaired swallowing skills. He presents with pharyngeal dysphagia. Pt would benefit from continued skilled ST. Prognosis for improvement remains Fair     Functional Communication Measure (FCM):   Severity Modifier for Medicare G-Code:  Swallowing  Current status:  FCM:  Level 5  - CJ  Projected status:  FCM:   Level 6  - CI    Patient Education/Response:     Proper technique of dysphagia exercises reviewed with patient. He verbalized  understanding.     Plans and Goals:     Continue POC.    MIGUEL Vaughn., CCC-SLP  Speech-Language Pathologist  9/29/2017

## 2017-10-02 ENCOUNTER — CLINICAL SUPPORT (OUTPATIENT)
Dept: REHABILITATION | Facility: HOSPITAL | Age: 77
End: 2017-10-02
Attending: OTOLARYNGOLOGY
Payer: MEDICARE

## 2017-10-02 DIAGNOSIS — R13.12 DYSPHAGIA, OROPHARYNGEAL: ICD-10-CM

## 2017-10-02 PROCEDURE — 92526 ORAL FUNCTION THERAPY: CPT | Mod: PN

## 2017-10-02 NOTE — PROGRESS NOTES
OUTPATIENT NEUROLOGICAL REHABILITATION  SPEECH THERAPY PROGRESS NOTE    Date:  10/02/2017  1600  Start Time:  1600  Stop Time:  1645    Subjective/History  Onset Date:  June 2016  Primary Diagnosis:  Dysphagia  Treatment Diagnosis:  Oropharyngeal Dysphagia  Referring Provider:  Dr. Donovan Garcia  Orders: ST for evaluation and treat  Current Medical History:  Mr. Gunter presents to the [facility] with moderate pharyngeal dysphagia secondary to the diagnosis of decreased swallow function..   Past Medical History:   Past Medical History:   Diagnosis Date    Hx of colonic polyps     followed by GI. Dr. Pham    Hyperlipidemia LDL goal <100     Overweight(278.02)     Prostate cancer september 2011    followed by urology, Dr. Rogers    Type II or unspecified type diabetes mellitus without mention of complication, not stated as uncontrolled     diet controlled     Precautions:  aspiration                UNTIMED  Procedure Time Min.   Dysphagia Therapy Start:  1600  Stop: 1645  45    Start:  Stop:      Total Minutes: 45  Total Timed Units: 0  Total Untimed Units: 1  Charges Billed/# of units: 1    Visit #: 6  Date of Evaluation: 9/1/17  Plan of Care Expiration:  9/1/17 - 10/27/17   Extended POC:   G-CODE  6/10    eval     update              Progress/Current Status    Subjective:     Pain: 0 /10  Pt reports that he feels like his swallow is getting stronger again.     Objective:   Short Term Goals (4 weeks):   1. Pt will perform oral motor exercises to improve labial and lingual strength x40 provided tongue blade resistance for 5 seconds with supervision during therapy session and as part of home program.  2. Pt will perform dysphagia exercises x40 targeting hyolaryngeal elevation with supervision.  3. Pt will perform dysphagia exercises x40 targeting tongue base retraction with supervision.  4. Pt will perform dysphagia exercises x40 targeting vocal adduction  with supervision.  5. Pt will perform effortful swallows x40 during consuming tsp sips of nectar thick liquids without any over clinical s/s of aspiration on 8/10 trials.  6. Pt will perform Shaker exercise (sustained for 60 seconds x3, repetitive x30 x3) with supervision during therapy session or as part of home program.  7. Pt will participate in home exercise program 5 times per day.      1. Pt completed pitch glides x45 with supervision    2. Pt completed Mendelhson x45 with min verbal A.    3. Pt completed sierra  X 45 with min verbal A.   4. Pt completed super supraglottic swallow x 45 with min verbal A.   5. Pt the CTAR (chin tuck against resistance) x 90 repetitive and x60 seconds x3 IND'ly.   6. Pt completed effortful swallow x45 with 1 tsp nectar thick liquids with min A with no overt s/s of aspiration.   7. Home exercise program was reviewed with patient. Pt reported completing home program 2x a day this week. Pt knows it should be 3-5x, however, stated a lot has been going on this past week.    Long Term Goals (8 weeks):   1. Pt will maintain adequate hydration/nutrition with optimum safety and efficiency of swallowing function on PO intake without overt s/s of aspiration for the highest appropriate diet level.   2. Pt will utilize compensatory strategies with optimum safety and efficiency of swallowing function on PO intake without overt s/s of aspiration for the highest diet level.     Assessment:     Current goals remain appropriate. Goals to be updated as necessary.     Mr. Gunter presents with moderate impaired swallowing skills. He presents with pharyngeal dysphagia. Pt would benefit from continued skilled ST. Prognosis for improvement remains Fair     Functional Communication Measure (FCM):   Severity Modifier for Medicare G-Code:  Swallowing  Current status:  FCM:  Level 5  - CJ  Projected status:  FCM:   Level 6  - CI    Patient Education/Response:     Swallowing precautions reviewed  with patient. He verbalized understanding.     Plans and Goals:     Continue POC.    BRAEDEN Edmondson, CCC-SLP  Speech Language Pathologist  10/2/2017

## 2017-10-04 ENCOUNTER — CLINICAL SUPPORT (OUTPATIENT)
Dept: REHABILITATION | Facility: HOSPITAL | Age: 77
End: 2017-10-04
Attending: OTOLARYNGOLOGY
Payer: MEDICARE

## 2017-10-04 DIAGNOSIS — R13.12 DYSPHAGIA, OROPHARYNGEAL: Primary | ICD-10-CM

## 2017-10-04 PROCEDURE — 92526 ORAL FUNCTION THERAPY: CPT | Mod: PN

## 2017-10-04 NOTE — PROGRESS NOTES
OUTPATIENT NEUROLOGICAL REHABILITATION  SPEECH THERAPY PROGRESS NOTE    Date:  10/04/2017  1600  Start Time:  0800  Stop Time:  0845    Subjective/History  Onset Date:  June 2016  Primary Diagnosis:  Dysphagia  Treatment Diagnosis:  Oropharyngeal Dysphagia  Referring Provider:  Dr. Donovan Garcia  Orders: ST for evaluation and treat  Current Medical History:  Mr. Gunter presents to the [facility] with moderate pharyngeal dysphagia secondary to the diagnosis of decreased swallow function..   Past Medical History:   Past Medical History:   Diagnosis Date    Hx of colonic polyps     followed by GI. Dr. Pham    Hyperlipidemia LDL goal <100     Overweight(278.02)     Prostate cancer september 2011    followed by urology, Dr. Rogers    Type II or unspecified type diabetes mellitus without mention of complication, not stated as uncontrolled     diet controlled     Precautions:  aspiration                UNTIMED  Procedure Time Min.   Dysphagia Therapy Start:  0800  Stop: 0845  45    Start:  Stop:      Total Minutes: 45  Total Timed Units: 0  Total Untimed Units: 1  Charges Billed/# of units: 1    Visit #: 7  Date of Evaluation: 9/1/17  Plan of Care Expiration:  9/1/17 - 10/27/17   Extended POC:   G-CODE  7/10    eval     update              Progress/Current Status    Subjective:     Pain: 0 /10  Pt reported again that he feels his swallow is getting stronger again.     Objective:   Short Term Goals (4 weeks):   1. Pt will perform oral motor exercises to improve labial and lingual strength x40 provided tongue blade resistance for 5 seconds with supervision during therapy session and as part of home program.  2. Pt will perform dysphagia exercises x40 targeting hyolaryngeal elevation with supervision.  3. Pt will perform dysphagia exercises x40 targeting tongue base retraction with supervision.  4. Pt will perform dysphagia exercises x40 targeting vocal adduction  with supervision.  5. Pt will perform effortful swallows x40 during consuming tsp sips of nectar thick liquids without any over clinical s/s of aspiration on 8/10 trials.  6. Pt will perform Shaker exercise (sustained for 60 seconds x3, repetitive x30 x3) with supervision during therapy session or as part of home program.  7. Pt will participate in home exercise program 5 times per day.      1. Pt completed pitch glides x45 with supervision    2. Pt completed Mendelhson x45 with min verbal A.    3. Pt completed sierra  X 45 with min verbal A.   4. Pt completed super supraglottic swallow x 45 with min verbal A.   5. Pt the CTAR (chin tuck against resistance) x 90 repetitive and x60 seconds x3 IND'ly.   6. Pt completed effortful swallow x45 with 1 tsp nectar thick liquids with min A with no overt s/s of aspiration.   7. Home exercise program was reviewed with patient. Pt reported completing home program 2x a day this week. Pt knows it should be 3-5x, however, stated a lot has been going on this past week.    Long Term Goals (8 weeks):   1. Pt will maintain adequate hydration/nutrition with optimum safety and efficiency of swallowing function on PO intake without overt s/s of aspiration for the highest appropriate diet level.   2. Pt will utilize compensatory strategies with optimum safety and efficiency of swallowing function on PO intake without overt s/s of aspiration for the highest diet level.     Assessment:     Current goals remain appropriate. Goals to be updated as necessary.     Mr. Gunter presents with moderate impaired swallowing skills. He presents with pharyngeal dysphagia. Pt would benefit from continued skilled ST. Prognosis for improvement remains Fair     Functional Communication Measure (FCM):   Severity Modifier for Medicare G-Code:  Swallowing  Current status:  FCM:  Level 5  - CJ  Projected status:  FCM:   Level 6  - CI    Patient Education/Response:     Swallowing precautions reviewed  with patient. He verbalized understanding.     Plans and Goals:     Continue POC.    MIGUEL Vaughn., CCC-SLP  Speech-Language Pathologist  10/4/2017

## 2017-10-06 ENCOUNTER — CLINICAL SUPPORT (OUTPATIENT)
Dept: REHABILITATION | Facility: HOSPITAL | Age: 77
End: 2017-10-06
Attending: OTOLARYNGOLOGY
Payer: MEDICARE

## 2017-10-06 DIAGNOSIS — R13.12 DYSPHAGIA, OROPHARYNGEAL: ICD-10-CM

## 2017-10-06 PROCEDURE — 92526 ORAL FUNCTION THERAPY: CPT | Mod: PN

## 2017-10-06 NOTE — PROGRESS NOTES
OUTPATIENT NEUROLOGICAL REHABILITATION  SPEECH THERAPY PROGRESS NOTE    Date:  10/06/2017    Start Time:  1035  Stop Time:  1115    Subjective/History  Onset Date:  June 2016  Primary Diagnosis:  Dysphagia  Treatment Diagnosis:  Oropharyngeal Dysphagia  Referring Provider:  Dr. Donovan Garcia  Orders: ST for evaluation and treat  Current Medical History:  Mr. Gunter presents to the [facility] with moderate pharyngeal dysphagia secondary to the diagnosis of decreased swallow function..   Past Medical History:   Past Medical History:   Diagnosis Date    Hx of colonic polyps     followed by GI. Dr. Pham    Hyperlipidemia LDL goal <100     Overweight(278.02)     Prostate cancer september 2011    followed by urology, Dr. Rogers    Type II or unspecified type diabetes mellitus without mention of complication, not stated as uncontrolled     diet controlled     Precautions:  aspiration        TIMED  Procedure Time Min.    Start:   Stop:       Start:  Stop:     Start:  Stop:     Start:  Stop:          UNTIMED  Procedure Time Min.   Dysphagia Therapy Start:  1035  Stop: 1115  40    Start:  Stop:      Total Minutes: 40  Total Timed Units: 0  Total Untimed Units: 1  Charges Billed/# of units: 1    Visit #: 8  Date of Evaluation: 9/1/17  Plan of Care Expiration:  9/1/17 - 10/27/17   Extended POC:   G-CODE  8/10    eval     update            Progress/Current Status    Subjective:     Pain: 0 /10  Pt reports an irritation in his throat after supraglottic swallow. He reports he's been experiencing a post nasal drip and that his throat was already irritated. He was instructed to avoid vocally abusive behaviors (like coughing forcefully) while sinus symptoms persisted.      Objective:     Short Term Goals (4 weeks):   1. Pt will perform oral motor exercises to improve labial and lingual strength x40 provided tongue blade resistance for 5 seconds with supervision during  therapy session and as part of home program.  2. Pt will perform dysphagia exercises x40 targeting hyolaryngeal elevation with supervision.  3. Pt will perform dysphagia exercises x40 targeting tongue base retraction with supervision.  4. Pt will perform dysphagia exercises x40 targeting vocal adduction with supervision.  5. Pt will perform effortful swallows x40 during consuming tsp sips of nectar thick liquids without any over clinical s/s of aspiration on 8/10 trials.  6. Pt will perform Shaker exercise (sustained for 60 seconds x3, repetitive x30 x3) with supervision during therapy session or as part of home program.  7. Pt will participate in home exercise program 5 times per day.      1. Pt completed pitch glides x45 with supervision    2. Pt completed Mendelhson x45 with min verbal A.    3. Pt completed sierra  X 45 with min verbal A.   4. Pt completed super supraglottic swallow x 15with min verbal A.   5. Pt the CTAR (chin tuck against resistance) x 90 repetitive and x60 seconds x4 IND'ly.   6. Pt completed effortful swallow x45 with 1 tsp nectar thick liquids with min A with no overt s/s of aspiration.   7. Home exercise program was reviewed with patient. Pt reported completing home program 2x a day this week. Pt reports that he gets tired and knows it should be 3-5x, however, stated a lot has been going on this past week.    Long Term Goals (8 weeks):   1. Pt will maintain adequate hydration/nutrition with optimum safety and efficiency of swallowing function on PO intake without overt s/s of aspiration for the highest appropriate diet level.   2. Pt will utilize compensatory strategies with optimum safety and efficiency of swallowing function on PO intake without overt s/s of aspiration for the highest diet level.     Assessment:     Current goals remain appropriate. Goals to be updated as necessary.     Mr. Gunter presents with moderate impaired swallowing skills. He presents with pharyngeal dysphagia.  Pt would benefit from continued skilled ST. Prognosis for improvement remains Fair     Functional Communication Measure (FCM):   Severity Modifier for Medicare G-Code:  Swallowing  Current status:  FCM:  Level 5  - CJ  Projected status:  FCM:   Level 6  - CI    Patient Education/Response:     Home program reviewed with patient. He verbalized understanding to all.    Plans and Goals:     Continue POC.    BRAEDEN Edmondson, CCC-SLP  Speech Language Pathologist  10/6/2017

## 2017-10-09 ENCOUNTER — CLINICAL SUPPORT (OUTPATIENT)
Dept: REHABILITATION | Facility: HOSPITAL | Age: 77
End: 2017-10-09
Attending: OTOLARYNGOLOGY
Payer: MEDICARE

## 2017-10-09 DIAGNOSIS — R13.12 DYSPHAGIA, OROPHARYNGEAL: ICD-10-CM

## 2017-10-09 PROCEDURE — 92526 ORAL FUNCTION THERAPY: CPT | Mod: PN

## 2017-10-09 NOTE — PROGRESS NOTES
OUTPATIENT NEUROLOGICAL REHABILITATION  SPEECH THERAPY PROGRESS NOTE    Date:  10/09/2017    Start Time:  1430  Stop Time:  1512    Subjective/History  Onset Date:  June 2016  Primary Diagnosis:  Dysphagia  Treatment Diagnosis:  Oropharyngeal Dysphagia  Referring Provider:  Dr. Donovan Garcia  Orders: ST for evaluation and treat  Current Medical History:  Mr. Gunter presents to the [facility] with moderate pharyngeal dysphagia secondary to the diagnosis of decreased swallow function..   Past Medical History:   Past Medical History:   Diagnosis Date    Hx of colonic polyps     followed by GI. Dr. Pham    Hyperlipidemia LDL goal <100     Overweight(278.02)     Prostate cancer september 2011    followed by urology, Dr. Rogers    Type II or unspecified type diabetes mellitus without mention of complication, not stated as uncontrolled     diet controlled     Precautions:  aspiration           UNTIMED  Procedure Time Min.   Dysphagia Therpapy Start:  1430  Stop: 1512 42    Start:  Stop:      Total Minutes: 42  Total Timed Units: 0  Total Untimed Units: 1  Charges Billed/# of units: 1    Visit #: 9  Date of Evaluation: 9/1/17  Plan of Care Expiration:  9/1/17 - 10/27/17   Extended POC:   G-CODE  9/10    eval     update            Progress/Current Status    Subjective:     Pain: 0 /10  Pt was motivated for therapy today.    Objective:   Short Term Goals (4 weeks):   1. Pt will perform oral motor exercises to improve labial and lingual strength x40 provided tongue blade resistance for 5 seconds with supervision during therapy session and as part of home program.  2. Pt will perform dysphagia exercises x40 targeting hyolaryngeal elevation with supervision.  3. Pt will perform dysphagia exercises x40 targeting tongue base retraction with supervision.  4. Pt will perform dysphagia exercises x40 targeting vocal adduction with supervision.  5. Pt will perform effortful  "swallows x40 during consuming tsp sips of nectar thick liquids without any over clinical s/s of aspiration on 8/10 trials.  6. Pt will perform Shaker exercise (sustained for 60 seconds x3, repetitive x30 x3) with supervision during therapy session or as part of home program.  7. Pt will participate in home exercise program 5 times per day.      1. Not formally addressed    2. Pt completed pitch glides x45 with supervision    Pt completed Mendelhson x30 with min verbal A.    3. Pt completed sierra  X 30 with min verbal A.   4. Pt completed super supraglottic swallow x 30with NTL with min verbal A. S/s of aspiration x1 after the swallow  5. Pt the CTAR (chin tuck against resistance) x 60 repetitive and x60 seconds x4 IND'ly.   6. Pt completed effortful swallow x45 with 1 tsp nectar thick liquids with min A with no overt s/s of aspiration.   7. Home exercise program was reviewed with patient. Pt reported completing home program 2x a day this week. Pt reports that he gets tired and knows it should be 3-5x.    Long Term Goals (8 weeks):   1. Pt will maintain adequate hydration/nutrition with optimum safety and efficiency of swallowing function on PO intake without overt s/s of aspiration for the highest appropriate diet level.   2. Pt will utilize compensatory strategies with optimum safety and efficiency of swallowing function on PO intake without overt s/s of aspiration for the highest diet level.     Assessment:     Current goals remain appropriate. Goals to be updated as necessary.     Mr. Gunter presents with moderate impaired swallowing skills. He presents with pharyngeal dysphagia. Pt would benefit from continued skilled ST. Prognosis for improvement remains Fair     Functional Communication Measure (FCM):   Severity Modifier for Medicare G-Code:  Swallowing  Current status:  FCM:  Level 5  - CJ  Projected status:  FCM:   Level 6  - CI    Patient Education/Response:     Pt reports a consistent "sinus " "something" that is affecting the quality of his voice. SLP recommended pt call his ENT to discuss these symptoms.     Plans and Goals:     Continue POC.    BRAEDEN Edmondson, CCC-SLP  Speech Language Pathologist  10/9/2017       "

## 2017-10-11 ENCOUNTER — CLINICAL SUPPORT (OUTPATIENT)
Dept: REHABILITATION | Facility: HOSPITAL | Age: 77
End: 2017-10-11
Attending: OTOLARYNGOLOGY
Payer: MEDICARE

## 2017-10-11 DIAGNOSIS — R13.12 DYSPHAGIA, OROPHARYNGEAL: ICD-10-CM

## 2017-10-11 PROCEDURE — 92610 EVALUATE SWALLOWING FUNCTION: CPT | Mod: PN

## 2017-10-11 PROCEDURE — 92526 ORAL FUNCTION THERAPY: CPT | Mod: PN

## 2017-10-11 PROCEDURE — G8997 SWALLOW GOAL STATUS: HCPCS | Mod: CH,PN

## 2017-10-11 PROCEDURE — G8996 SWALLOW CURRENT STATUS: HCPCS | Mod: CI,PN

## 2017-10-11 NOTE — PROGRESS NOTES
"OUTPATIENT NEUROLOGICAL REHABILITATION  SPEECH THERAPY PROGRESS NOTE    Date:  10/11/2017    Start Time:  1430  Stop Time:  1510    Subjective/History  Onset Date:  June 2016  Primary Diagnosis:  Dysphagia  Treatment Diagnosis:  Oropharyngeal Dysphagia  Referring Provider:  Dr. Donovan Garcia  Orders: ST for evaluation and treat  Current Medical History:  Mr. Gunter presents to the [facility] with moderate pharyngeal dysphagia secondary to the diagnosis of decreased swallow function..   Past Medical History:   Past Medical History:   Diagnosis Date    Hx of colonic polyps     followed by GI. Dr. Pham    Hyperlipidemia LDL goal <100     Overweight(278.02)     Prostate cancer september 2011    followed by urology, Dr. Rogers    Type II or unspecified type diabetes mellitus without mention of complication, not stated as uncontrolled     diet controlled     Precautions:  aspiration          UNTIMED  Procedure Time Min.   Dysphagia Therapy Start:  1445  Stop: 1510  25   Clinical Swallow Evaluation Start: 1430  Stop: 1445 15     Total Minutes: 40  Total Timed Units: 0  Total Untimed Units: 2  Charges Billed/# of units: 2    Visit #: 10  Date of Evaluation: 9/1/17  Plan of Care Expiration:  9/1/17 - 10/27/17   Extended POC:   G-CODE  10/10    eval     update              Progress/Current Status    Subjective:     Pain: 0 /10  Pt reports that he has been "cheating" on his liquid recommendations at home and has not experienced any s/s of aspiration. He reports that his sinus "thing" has cleared up, and he does not have an itchy feeling in his throat before or after therapy.    Objective:     Short Term Goals (4 weeks):   1. Pt will perform oral motor exercises to improve labial and lingual strength x40 provided tongue blade resistance for 5 seconds with supervision during therapy session and as part of home program.  2. Pt will perform dysphagia exercises x40 " targeting hyolaryngeal elevation with supervision.  3. Pt will perform dysphagia exercises x40 targeting tongue base retraction with supervision.  4. Pt will perform dysphagia exercises x40 targeting vocal adduction with supervision.  5. Pt will perform effortful swallows x40 during consuming tsp sips of nectar thick liquids without any over clinical s/s of aspiration on 8/10 trials.  6. Pt will perform Shaker exercise (sustained for 60 seconds x3, repetitive x30 x3) with supervision during therapy session or as part of home program.  7. Pt will participate in home exercise program 5 times per day.      1. Not formally addressed    2. Pt completed pitch glides [id number] supervision    Pt completed Mendelhson x30 with min verbal A.    3. Pt completed sierra  X 30 with min verbal A.   4. Pt completed super supraglottic swallow x 30with NTL with min verbal A. S/s of aspiration x0 after the swallow  5. Pt the CTAR (chin tuck against resistance) x 60 repetitive and x60 seconds x0 IND'ly.   6. Pt completed effortful swallow x30 with 1 tsp nectar thick liquids with min A with no overt s/s of aspiration.   7. Home exercise program was reviewed with patient. Pt reported completing home program 2x a day this week. Pt reports that he gets tired and knows it should be 3-5x.      Clinical Swallow Study:   A clinical swallow study was performed to assess functionality of the pt's swallow. Mr. Gunter was presented with:  Presentation S/s of aspiration description   Tsp thin liquid x2 No No overt s/s of aspiration   Self regulated sip thin liquid x2 No No overt s/s of aspiration   Thin liquid via straw x2  No No overt s/s of aspiration   Consecutive sips via straw and cup x2 each No No overt s/s of aspiration   Tsp mixed pudding No No overt s/s of aspiration   solid No No overt s/s of aspiration     Oral Phase: WFL. No anterior spillage of bolus; adequate seal around cup, straw, and spoon; no oral residue after any presentations.  "  Pharyngeal Phase: no overt s/s of aspiration observed across consistencies. Pt requested liquid wash after solid, but denied anything feeling "stuck".     Long Term Goals (8 weeks):   1. Pt will maintain adequate hydration/nutrition with optimum safety and efficiency of swallowing function on PO intake without overt s/s of aspiration for the highest appropriate diet level.   2. Pt will utilize compensatory strategies with optimum safety and efficiency of swallowing function on PO intake without overt s/s of aspiration for the highest diet level.     Assessment:   Mr. Gunter presents with moderate impaired swallowing skills. He presents with pharyngeal dysphagia. Pt would benefit from continued skilled ST. Prognosis for improvement remains Fair     Functional Communication Measure (FCM):   Severity Modifier for Medicare G-Code:  Swallowing  Current status:  FCM:  Level 5  - CJ  Projected status:  FCM:   Level 6  - CI    Patient Education/Response:     MBSS recommendation reviewed wit hpatient. He verbalized understanding to all.     Plans and Goals:     1. Participate in MBSS   2. Continue POC.    BRAEDEN Edmondson, CCC-SLP  Speech Language Pathologist  10/11/2017       "

## 2017-10-13 ENCOUNTER — CLINICAL SUPPORT (OUTPATIENT)
Dept: REHABILITATION | Facility: HOSPITAL | Age: 77
End: 2017-10-13
Attending: OTOLARYNGOLOGY
Payer: MEDICARE

## 2017-10-13 DIAGNOSIS — R13.12 DYSPHAGIA, OROPHARYNGEAL: ICD-10-CM

## 2017-10-13 PROCEDURE — 92526 ORAL FUNCTION THERAPY: CPT | Mod: PN

## 2017-10-13 NOTE — PROGRESS NOTES
"OUTPATIENT NEUROLOGICAL REHABILITATION  SPEECH THERAPY PROGRESS NOTE    Date:  10/13/2017    Start Time:  1230  Stop Time:  1314    Subjective/History  Onset Date:  June 2016  Primary Diagnosis:  Dysphagia  Treatment Diagnosis:  Oropharyngeal Dysphagia  Referring Provider:  Dr. Donovan Garcia  Orders: ST for evaluation and treat  Current Medical History: Diego Gunter   presents to the [facility] secondary to the diagnosis of dysphagia. Mr. Gunter initially presented with trouble swallowing 2/2 dysphagia 6/08/2016.     He first participated in a modified barium swallow study (MBSS) 5/27/16 which indicated "Modified Barium Swallow Study completed.  Moderate oropharyngeal dysphagia c/b globus sensation;delayed pharyngeal swallow;premature spillage;multiple spontaneous swallows;immediate coughing/choking;vallecular pooling;vallecular stasis.  Pt with increasing vallecular stasis with thicker consistencies.  Globus sensation consistent with vallecular stasis.  Pt coughing throughout all trials likely due to globus sensation. No aspiration noted. Penetration noted.  No blockage/obstruction.  Pt with difficulty clearing vallecular stasis; max effort; chin tuck; multiple swallows; effortful swallow.    ST RECS: regular and nectar thickened liquids. Strict aspiration precautions: upright for meals; small bites/sips; chin tuck; multiple swallows, alternate bites/sips; effortful swallow."      In June 2016, Mr. Gunter participated in an OP ST evaluation where he was referred to neurology to r/o neurological onset of dysphagia (i.e. Possible PD diagnosis per family history). Per patient report and chart review, neurological onset of swallow as ruled out.     Repeat MBSS completed 6/24/16 revealed "moderate oropharyngeal dysphagia c/b decreased oral motor skills, decreased hyolaryngeal elevation/excursion, decreased tongue base retraction, decreased pharyngeal " "constriction, and decreased vocal fold closure. Penetration and aspiration noted during the swallow of thin and nectar thick liquids."    Repeat MBSS completed 8/19/16 indicated "Pt presents with moderate oropharyngeal dysphagia c/b decreased oral motor skills, delayed triggering of the pharyngeal swallow, decreased hyolaryngeal elevation/excursion, decreased tongue base retraction, decreased pressure generated during the swallow, and decreased vocal fold closure. Penetration and aspiration noted during the swallow of thin and nectar thick liquids"    Repeat MBSS completed 10/19/16 indicated "Pt presents with moderate oropharyngeal dysphagia c/b decreased oral motor skills, delayed triggering of the pharyngeal swallow, decreased hyolaryngeal elevation/excursion, decreased tongue base retraction, decreased pressure generated during the swallow, and decreased vocal fold closure. Penetration and aspiration noted during the swallow of thin and nectar thick liquids. "    Patient did not return to therapy as recommended following 10/19/16 MBSS. He presented to his PCP with complaints of dysphagia on 8/14/17 where re-evaluation by ST was recommended. Pt participated in a MBSS on 8/31/17 which indicated "Pt presents with overall improved swallowing function compared to previous NGUYỄN completed in 2016, however, continued aspiration noted with thin liquids. Pt presents with moderate pharyngeal dysphagia c/b decreased hyolaryngeal elevation/excursion, decreased tongue base retraction, decreased pressure generated during the swallow, and decreased vocal fold closure. Penetration and aspiration noted during the swallow of thin liquids. Flash penetration noted with nectar thick liquids. "   Past Medical History:   Past Medical History:   Diagnosis Date    Hx of colonic polyps     followed by GI. Dr. Pham    Hyperlipidemia LDL goal <100     Overweight(278.02)     Prostate cancer september 2011    followed by urology,  " Ken    Type II or unspecified type diabetes mellitus without mention of complication, not stated as uncontrolled     diet controlled     Precautions:  aspiration        UNTIMED  Procedure Time Min.   Dysphagia Therapy Start:  1230  Stop: 1314  44    Start:  Stop:      Total Minutes: 44  Total Timed Units: 0  Total Untimed Units: 1  Charges Billed/# of units: 1    Visit #: 11  Date of Evaluation: 9/1/17  Plan of Care Expiration:  9/1/17 - 10/27/17   Extended POC:   G-CODE  11/10    eval     update              Progress/Current Status    Subjective:     Pain: 0 /10  Pt reports no s/s of aspiration at home. He intermittently tucks his chin and uses NTL at home but not as frequently when out of the house. He verbalized understanding that he should be using NTL with chin tuck all the time.     Objective:     Short Term Goals (4 weeks):   1. Pt will perform oral motor exercises to improve labial and lingual strength x40 provided tongue blade resistance for 5 seconds with supervision during therapy session and as part of home program.  2. Pt will perform dysphagia exercises x40 targeting hyolaryngeal elevation with supervision.  3. Pt will perform dysphagia exercises x40 targeting tongue base retraction with supervision.  4. Pt will perform dysphagia exercises x40 targeting vocal adduction with supervision.  5. Pt will perform effortful swallows x40 during consuming tsp sips of nectar thick liquids without any over clinical s/s of aspiration on 8/10 trials.  6. Pt will perform Shaker exercise (sustained for 60 seconds x3, repetitive x30 x3) with supervision during therapy session or as part of home program.  7. Pt will participate in home exercise program 5 times per day.      1. Not formally addressed    2. Pt completed pitch glides x 45with supervision    Pt completed Mendelhson x45 with supervision  3. Pt completed sierra  X 45 with supervision   4. Pt completed super supraglottic swallow x 30x with supervision with  NTL. S/s of aspiration x0 after the swallow  5. Pt the CTAR (chin tuck against resistance) x 60 repetitive and x60 seconds x3  IND'ly.   6. Pt completed effortful swallow x 45 with 1 tsp nectar thick liquids with supervision with no overt s/s of aspiration.   7. Home exercise program was reviewed with patient. Pt reported completing home program 2x a day this week.     Long Term Goals (8 weeks):   1. Pt will maintain adequate hydration/nutrition with optimum safety and efficiency of swallowing function on PO intake without overt s/s of aspiration for the highest appropriate diet level.   2. Pt will utilize compensatory strategies with optimum safety and efficiency of swallowing function on PO intake without overt s/s of aspiration for the highest diet level.     Assessment:     Mr. Gunter presented with moderately impaired swallowing skills. He presents with pharyngeal dysphagia. He has participated diligently in his home program and ST. His swallowing skills warrant further instrumental evaluation. Should results of MBSS indicate need for further skilled services, pt should return to ST. Until the results of his new MBSS are available, pt has been instructed to continue using thickener in liquids with chin tuck. He verbalized understanding to all.  Prognosis for improvement remains Fair     Functional Communication Measure (FCM):   Severity Modifier for Medicare G-Code:  Swallowing  Current status:  FCM:  Level 5  - CJ  Projected status:  FCM:   Level 6  - CI    Patient Education/Response:   MBSS and f/u therapy recommendations discussed in detail with patient. He verbalized understanding.    Plans and Goals:     1. Participate in MBSS   2. Return to ST if MBSS results indicate the need for further ST.    BRAEDEN Edmondson, CCC-SLP  Speech Language Pathologist  10/13/2017

## 2017-10-23 ENCOUNTER — CLINICAL SUPPORT (OUTPATIENT)
Dept: FAMILY MEDICINE | Facility: CLINIC | Age: 77
End: 2017-10-23
Payer: MEDICARE

## 2017-10-23 DIAGNOSIS — Z23 NEED FOR PROPHYLACTIC VACCINATION AND INOCULATION AGAINST INFLUENZA: Primary | ICD-10-CM

## 2017-10-23 PROCEDURE — G0008 ADMIN INFLUENZA VIRUS VAC: HCPCS | Mod: S$GLB,,, | Performed by: INTERNAL MEDICINE

## 2017-10-23 PROCEDURE — 99499 UNLISTED E&M SERVICE: CPT | Mod: S$GLB,,, | Performed by: INTERNAL MEDICINE

## 2017-10-23 PROCEDURE — 90662 IIV NO PRSV INCREASED AG IM: CPT | Mod: S$GLB,,, | Performed by: INTERNAL MEDICINE

## 2017-11-27 ENCOUNTER — TELEPHONE (OUTPATIENT)
Dept: FAMILY MEDICINE | Facility: CLINIC | Age: 77
End: 2017-11-27

## 2017-11-27 DIAGNOSIS — E11.40 TYPE 2 DIABETES, CONTROLLED, WITH NEUROPATHY: Primary | ICD-10-CM

## 2017-11-27 DIAGNOSIS — E78.5 HYPERLIPIDEMIA LDL GOAL <100: ICD-10-CM

## 2017-11-27 NOTE — TELEPHONE ENCOUNTER
----- Message from Rachel Bedoya sent at 11/27/2017 10:34 AM CST -----  Contact: Self   Patient need lab orders. Please call at [phone].

## 2018-01-17 ENCOUNTER — HOSPITAL ENCOUNTER (EMERGENCY)
Facility: HOSPITAL | Age: 78
Discharge: HOME | End: 2018-01-17
Attending: EMERGENCY MEDICINE
Payer: MEDICARE

## 2018-01-17 VITALS
WEIGHT: 165 LBS | OXYGEN SATURATION: 96 % | BODY MASS INDEX: 24.44 KG/M2 | HEART RATE: 55 BPM | TEMPERATURE: 98 F | SYSTOLIC BLOOD PRESSURE: 142 MMHG | HEIGHT: 69 IN | DIASTOLIC BLOOD PRESSURE: 77 MMHG | RESPIRATION RATE: 18 BRPM

## 2018-01-17 DIAGNOSIS — S01.81XA FACIAL LACERATION, INITIAL ENCOUNTER: ICD-10-CM

## 2018-01-17 DIAGNOSIS — S09.90XA INJURY OF HEAD, INITIAL ENCOUNTER: ICD-10-CM
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
## 2018-01-17 PROCEDURE — 25000003 PHARM REV CODE 250: Performed by: NURSE PRACTITIONER

## 2018-01-17 PROCEDURE — 12011 RPR F/E/E/N/L/M 2.5 CM/<: CPT

## 2018-01-17 PROCEDURE — 99283 EMERGENCY DEPT VISIT LOW MDM: CPT | Mod: 25

## 2018-01-17 RX ORDER — LIDOCAINE HYDROCHLORIDE AND EPINEPHRINE 10; 10 MG/ML; UG/ML
10 INJECTION, SOLUTION INFILTRATION; PERINEURAL
Status: COMPLETED | OUTPATIENT
Start: 2018-01-17 | End: 2018-01-17

## 2018-01-17 RX ADMIN — LIDOCAINE HYDROCHLORIDE AND EPINEPHRINE 10 ML: 10; 10 INJECTION, SOLUTION INFILTRATION; PERINEURAL at 01:01

## 2018-01-17 RX ADMIN — BACITRACIN, NEOMYCIN, POLYMYXIN B 1 EACH: 400; 3.5; 5 OINTMENT TOPICAL at 01:01

## 2018-01-17 NOTE — ED PROVIDER NOTES
Encounter Date: 1/17/2018    SCRIBE #1 NOTE: I, Maggy Brooks, am scribing for, and in the presence of,  KASI Roa. I have scribed the following portions of the note - Other sections scribed: HPI and ROS.       History     Chief Complaint   Patient presents with    Icy Fall     slip on ice, denies LOC; laceration to right eye, eyeglasses cut side of eye     CC: Icy Fall    HPI: The pt is a 77 y.o. M with a PMHx of prostate cancer (september 2011, w/ prostatectomy, followed by urologist Dr. Rogers), colon polyps, type III or unspecificed type diabetes, and HLD who presents to the ED for evaluation of a fall that occurred 2 hrs ago. Pt states that he slipped and fell on icy cement without LOC. He says that his sunglasses scraped his face and c/o laceration to R periorbital region. No attempted treatments. Pt otherwise denies dizzines, neck pain, back pain, and other associated symptoms.       The history is provided by the patient. No  was used.     Review of patient's allergies indicates:  No Known Allergies  Past Medical History:   Diagnosis Date    Hx of colonic polyps     followed by GI. Dr. Pham    Hyperlipidemia LDL goal <100     Overweight(278.02)     Prostate cancer september 2011    followed by urology, Dr. Rogers    Type II or unspecified type diabetes mellitus without mention of complication, not stated as uncontrolled     diet controlled     Past Surgical History:   Procedure Laterality Date    CATARACT EXTRACTION, BILATERAL  2014    PROSTATECTOMY       Family History   Problem Relation Age of Onset    Colon cancer Mother     Diabetes Mother     Heart disease Father     Mental illness Sister     Diabetes Sister     Other Brother      polio as a child    Diabetes      Diabetes Brother     Myasthenia gravis Brother     Parkinsonism Brother     Diabetes Brother     Dementia Brother     Lung cancer Brother      smoker    Diabetes Maternal Aunt      Social  History   Substance Use Topics    Smoking status: Former Smoker    Smokeless tobacco: Never Used      Comment: quit age 23    Alcohol use Yes      Comment: beer occasionally     Review of Systems   Constitutional: Negative for chills, diaphoresis and fever.   HENT: Negative for ear pain and sore throat.    Eyes: Negative for redness.   Respiratory: Negative for cough and shortness of breath.    Cardiovascular: Negative for chest pain.   Gastrointestinal: Negative for abdominal pain, diarrhea, nausea and vomiting.   Genitourinary: Negative for dysuria.   Musculoskeletal: Negative for back pain and neck pain.   Skin: Positive for wound (laceration, R periorbital region). Negative for rash.   Neurological: Negative for dizziness, syncope and headaches.        (-) LOC       Physical Exam     Initial Vitals [01/17/18 1224]   BP Pulse Resp Temp SpO2   (!) 141/65 (!) 49 20 -- (!) 94 %      MAP       90.33         Physical Exam    Nursing note and vitals reviewed.  Constitutional: He appears well-developed and well-nourished. He is not diaphoretic. He is cooperative.  Non-toxic appearance. He does not have a sickly appearance. No distress.   HENT:   Head: Normocephalic. Head is with laceration. Head is without raccoon's eyes and without Evans's sign.       Right Ear: Tympanic membrane and external ear normal. No hemotympanum.   Left Ear: Tympanic membrane and external ear normal. No hemotympanum.   Nose: No rhinorrhea.   Mouth/Throat: Oropharynx is clear and moist.   Eyes: Conjunctivae and EOM are normal.   Neck: Trachea normal and full passive range of motion without pain. Neck supple. No spinous process tenderness and no muscular tenderness present. Normal range of motion present. No neck rigidity.   Cardiovascular: Regular rhythm and intact distal pulses. Bradycardia present.    Pulses:       Radial pulses are 2+ on the right side, and 2+ on the left side.   Pulmonary/Chest: No tachypnea and no bradypnea. No  respiratory distress.   Musculoskeletal: Normal range of motion.        Cervical back: Normal. He exhibits no tenderness, no bony tenderness, no deformity and no pain.        Thoracic back: Normal. He exhibits no tenderness, no bony tenderness, no deformity and no pain.        Lumbar back: Normal. He exhibits no tenderness, no bony tenderness, no deformity and no pain.   Lymphadenopathy:     He has no cervical adenopathy.   Neurological: He is alert and oriented to person, place, and time. He has normal strength. No sensory deficit. Coordination and gait normal. GCS eye subscore is 4. GCS verbal subscore is 5. GCS motor subscore is 6.   Skin: Skin is warm and dry. Capillary refill takes less than 2 seconds. Laceration (head) noted. No bruising and no rash noted.   Psychiatric: He has a normal mood and affect. His speech is normal and behavior is normal. Judgment and thought content normal.         ED Course   Lac Repair  Date/Time: 1/17/2018 1:48 PM  Performed by: WINSTON SCHNEIDER  Authorized by: STERLING STEVEN   Body area: head/neck  Location details: right eyebrow  Laceration length: 1.5 cm  Foreign bodies: no foreign bodies  Tendon involvement: none  Nerve involvement: none  Anesthesia: local infiltration    Anesthesia:  Local Anesthetic: lidocaine 1% with epinephrine  Anesthetic total: 2 mL  Patient sedated: no  Irrigation solution: saline  Irrigation method: syringe  Amount of cleaning: standard  Debridement: none  Degree of undermining: none  Skin closure: 6-0 Prolene  Number of sutures: 4  Technique: simple  Dressing: antibiotic ointment  Patient tolerance: Patient tolerated the procedure well with no immediate complications        Labs Reviewed - No data to display                APC / Resident Notes:   This is an evaluation of a 77-year-old male that presents emergency with complaints of a laceration and head injury following a slip and fall on ice prior to arrival today.  Patient denies loss of  consciousness. Physical Exam shows a non-toxic, afebrile, and well appearing male. No hemotympanum or septal hematoma.  There is a 1.5 cm laceration above the right brow.  Extra ocular movements are intact without pain.  There is mild tenderness surrounding the laceration but no focal tenderness surrounding the orbits.  No other external evidence of head trauma with the exception of the laceration.  No midline cervical, thoracic, lumbar tenderness palpation.  Full range of motion of all extremities. Vital Signs Are Reassuring. If available, previous records reviewed. RESULTS: CT the head with no acute intracranial abnormalities.  There is scattered white matter changes from chronic microvascular ischemia.  Patient and family advised of the chronic microvascular ischemia changes.    My overall impression is fall, head injury, facial laceration. I considered, but at this time, do not suspect skull fracture, SAH/ICH, vertebral fracture, ocular or ocular motor injury.    ED Course: Pain medication was offered in the emergency Department but the patient declined. D/C Information: Laceration instructions, suture removal instructions, head injury precautions. The diagnosis, treatment plan, instructions for follow-up and reevaluation with PCP as well as ED return precautions were discussed and understanding was verbalized. All questions or concerns have been addressed. This case was discussed with Dr. Narayanan who is in agreement with my assessment and plan. KIERA Pacheco, ANDRYP-C            Scribe Attestation:   Scribe #1: I performed the above scribed service and the documentation accurately describes the services I performed. I attest to the accuracy of the note.    Attending Attestation:           Physician Attestation for Scribe:  Physician Attestation Statement for Scribe #1: I, KASI Roa, reviewed documentation, as scribed by Maggy Brooks in my presence, and it is both accurate and complete.                 ED  Course      Clinical Impression:   The primary encounter diagnosis was Fall due to slipping on ice or snow, initial encounter. Diagnoses of Injury of head, initial encounter and Facial laceration, initial encounter were also pertinent to this visit.    Disposition:   Disposition: Discharged  Condition: Stable                        Alhaji Napier, [facility]  01/17/18 1447

## 2018-01-17 NOTE — ED TRIAGE NOTES
Slipped on ice this morning laceration right eyebrow from glasses not ko'd no other trauma expressed

## 2018-01-17 NOTE — DISCHARGE INSTRUCTIONS
Please keep your wound clean and dry.  Wash gently with soap and water and apply antibiotic ointment (bacitracin, neosporin, etc.) over the wound after washing. Please watch for signs of infection including: increased\spreading redness, swelling, pus-like discharge, or a fever greater than 100.4F. If you experience any of these, please contact your Primary Care Doctor or Return to the Emergency Department for a wound check.     Please follow up with your Primary Care Doctor in 3-5 days for wound recheck and suture removal.  You may return to the Emergency Department if you are unable to see your Primary Care Doctor.  Please return to the ER for any new or worsening symptoms.

## 2018-01-23 ENCOUNTER — LAB VISIT (OUTPATIENT)
Dept: LAB | Facility: HOSPITAL | Age: 78
End: 2018-01-23
Attending: INTERNAL MEDICINE
Payer: MEDICARE

## 2018-01-23 ENCOUNTER — OFFICE VISIT (OUTPATIENT)
Dept: FAMILY MEDICINE | Facility: CLINIC | Age: 78
End: 2018-01-23
Payer: MEDICARE

## 2018-01-23 VITALS
OXYGEN SATURATION: 97 % | BODY MASS INDEX: 24.5 KG/M2 | HEART RATE: 52 BPM | TEMPERATURE: 98 F | HEIGHT: 69 IN | SYSTOLIC BLOOD PRESSURE: 106 MMHG | DIASTOLIC BLOOD PRESSURE: 64 MMHG | WEIGHT: 165.38 LBS

## 2018-01-23 DIAGNOSIS — Z48.02 VISIT FOR SUTURE REMOVAL: Primary | ICD-10-CM

## 2018-01-23 DIAGNOSIS — E11.40 TYPE 2 DIABETES, CONTROLLED, WITH NEUROPATHY: ICD-10-CM

## 2018-01-23 DIAGNOSIS — E78.5 HYPERLIPIDEMIA LDL GOAL <100: ICD-10-CM

## 2018-01-23 LAB
ALBUMIN SERPL BCP-MCNC: 4 G/DL
ALP SERPL-CCNC: 65 U/L
ALT SERPL W/O P-5'-P-CCNC: 33 U/L
ANION GAP SERPL CALC-SCNC: 8 MMOL/L
AST SERPL-CCNC: 35 U/L
BILIRUB SERPL-MCNC: 0.9 MG/DL
BUN SERPL-MCNC: 7 MG/DL
CALCIUM SERPL-MCNC: 9.7 MG/DL
CHLORIDE SERPL-SCNC: 104 MMOL/L
CHOLEST SERPL-MCNC: 142 MG/DL
CHOLEST/HDLC SERPL: 3.2 {RATIO}
CO2 SERPL-SCNC: 30 MMOL/L
CREAT SERPL-MCNC: 0.9 MG/DL
EST. GFR  (AFRICAN AMERICAN): >60 ML/MIN/1.73 M^2
EST. GFR  (NON AFRICAN AMERICAN): >60 ML/MIN/1.73 M^2
ESTIMATED AVG GLUCOSE: 111 MG/DL
GLUCOSE SERPL-MCNC: 112 MG/DL
HBA1C MFR BLD HPLC: 5.5 %
HDLC SERPL-MCNC: 45 MG/DL
HDLC SERPL: 31.7 %
LDLC SERPL CALC-MCNC: 80.4 MG/DL
NONHDLC SERPL-MCNC: 97 MG/DL
POTASSIUM SERPL-SCNC: 4.2 MMOL/L
PROT SERPL-MCNC: 7.7 G/DL
SODIUM SERPL-SCNC: 142 MMOL/L
TRIGL SERPL-MCNC: 83 MG/DL

## 2018-01-23 PROCEDURE — 99999 PR PBB SHADOW E&M-EST. PATIENT-LVL IV: CPT | Mod: PBBFAC,,, | Performed by: NURSE PRACTITIONER

## 2018-01-23 PROCEDURE — 80061 LIPID PANEL: CPT

## 2018-01-23 PROCEDURE — 99213 OFFICE O/P EST LOW 20 MIN: CPT | Mod: S$GLB,,, | Performed by: NURSE PRACTITIONER

## 2018-01-23 PROCEDURE — 83036 HEMOGLOBIN GLYCOSYLATED A1C: CPT

## 2018-01-23 PROCEDURE — 36415 COLL VENOUS BLD VENIPUNCTURE: CPT | Mod: PO

## 2018-01-23 PROCEDURE — 80053 COMPREHEN METABOLIC PANEL: CPT

## 2018-01-23 NOTE — PROGRESS NOTES
Subjective:       Patient ID: Diego Gunter is a 77 y.o. male.    Chief Complaint: Suture / Staple Removal (Right brow area)    77-year-old male presents to the clinic today for sutures to be removed from the right side of his right eyebrow.  He was seen in the emergency room on 1/17/2018 after he slipped on ice and fell and his sunglasses caused a laceration just above his right eyebrow.  He experienced no loss of consciousness.  His CT of the head showed no intracranial process and scattered white matter changes felt to be from chronic microvascular ischemia.  He was told to follow-up to have the sutures removed in  3-5 days.  He's been washing his suture site and applying antibiotic ointment twice a day.  He denies any fever or chills.  He denies any redness or drainage to suture site area.      Past Medical History:   Diagnosis Date    Hx of colonic polyps     followed by GI. Dr. Pham    Hyperlipidemia LDL goal <100     Overweight(278.02)     Prostate cancer september 2011    followed by urology, Dr. Rogers    Type II or unspecified type diabetes mellitus without mention of complication, not stated as uncontrolled     diet controlled     Past Surgical History:   Procedure Laterality Date    CATARACT EXTRACTION, BILATERAL  2014    PROSTATECTOMY        reports that he has quit smoking. He has never used smokeless tobacco. He reports that he drinks alcohol. He reports that he does not use drugs.  Review of Systems   Musculoskeletal: Negative for gait problem.   Skin:        Sutures side of right eyebrow       Objective:      Physical Exam   Constitutional: He is oriented to person, place, and time. He appears well-developed and well-nourished. No distress.   Cardiovascular: Normal rate, regular rhythm and normal heart sounds.  Exam reveals no gallop and no friction rub.    No murmur heard.  Pulmonary/Chest: Effort normal and breath sounds normal. No respiratory distress. He has no wheezes. He has no  rales.   Neurological: He is alert and oriented to person, place, and time.   Skin: He is not diaphoretic.   Stitches intact right side of right eyebrow no surrounding redness or drainage noted       Assessment:       1. Visit for suture removal        Plan:         Visit for suture removal  - 4 sutures removed side of right eyebrow without any difficulty antibiotic ointment applied tolerated procedure well

## 2018-01-29 ENCOUNTER — OFFICE VISIT (OUTPATIENT)
Dept: FAMILY MEDICINE | Facility: CLINIC | Age: 78
End: 2018-01-29
Payer: MEDICARE

## 2018-01-29 VITALS
RESPIRATION RATE: 16 BRPM | HEART RATE: 55 BPM | DIASTOLIC BLOOD PRESSURE: 70 MMHG | HEIGHT: 69 IN | SYSTOLIC BLOOD PRESSURE: 116 MMHG | OXYGEN SATURATION: 96 % | TEMPERATURE: 98 F | BODY MASS INDEX: 25.08 KG/M2 | WEIGHT: 169.31 LBS

## 2018-01-29 DIAGNOSIS — E78.5 HYPERLIPIDEMIA LDL GOAL <100: ICD-10-CM

## 2018-01-29 DIAGNOSIS — E11.40 TYPE 2 DIABETES, CONTROLLED, WITH NEUROPATHY: ICD-10-CM
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
## 2018-01-29 DIAGNOSIS — I10 ESSENTIAL HYPERTENSION: ICD-10-CM

## 2018-01-29 DIAGNOSIS — Z00.00 ROUTINE MEDICAL EXAM: Primary | ICD-10-CM

## 2018-01-29 LAB
CREAT UR-MCNC: 21 MG/DL
MICROALBUMIN UR DL<=1MG/L-MCNC: <2.5 UG/ML
MICROALBUMIN/CREATININE RATIO: ABNORMAL UG/MG

## 2018-01-29 PROCEDURE — 99397 PER PM REEVAL EST PAT 65+ YR: CPT | Mod: S$GLB,,, | Performed by: INTERNAL MEDICINE

## 2018-01-29 PROCEDURE — 82043 UR ALBUMIN QUANTITATIVE: CPT

## 2018-01-29 PROCEDURE — 99999 PR PBB SHADOW E&M-EST. PATIENT-LVL III: CPT | Mod: PBBFAC,,, | Performed by: INTERNAL MEDICINE

## 2018-01-29 NOTE — PROGRESS NOTES
HISTORY OF PRESENT ILLNESS:  Diego Gunter is a 77 y.o. male who presents to the clinic today for a routine medical physical exam. His last physical exam was approximately 1 years(s) ago.      PAST MEDICAL HISTORY:  Past Medical History:   Diagnosis Date    Hx of colonic polyps     followed by GI. Dr. Pham    Hyperlipidemia LDL goal <100     Overweight(278.02)     Prostate cancer september 2011    followed by urology, Dr. Rogers    Type II or unspecified type diabetes mellitus without mention of complication, not stated as uncontrolled     diet controlled       PAST SURGICAL HISTORY:  Past Surgical History:   Procedure Laterality Date    CATARACT EXTRACTION, BILATERAL  2014    PROSTATECTOMY         SOCIAL HISTORY:  Social History     Social History    Marital status:      Spouse name: N/A    Number of children: 2    Years of education: N/A     Occupational History    tool       Social History Main Topics    Smoking status: Former Smoker    Smokeless tobacco: Never Used      Comment: quit age 23    Alcohol use Yes      Comment: beer occasionally    Drug use: No    Sexual activity: Yes     Partners: Female     Other Topics Concern    Not on file     Social History Narrative    No narrative on file       FAMILY HISTORY:  Family History   Problem Relation Age of Onset    Colon cancer Mother     Diabetes Mother     Heart disease Father     Mental illness Sister     Diabetes Sister     Other Brother      polio as a child    Diabetes      Diabetes Brother     Myasthenia gravis Brother     Parkinsonism Brother     Diabetes Brother     Dementia Brother     Lung cancer Brother      smoker    Diabetes Maternal Aunt        ALLERGIES AND MEDICATIONS: updated and reviewed.  Review of patient's allergies indicates:  No Known Allergies  Medication List with Changes/Refills   Current Medications    ASCORBIC ACID (VITAMIN C) 1000 MG TABLET    Take 1,000 mg by mouth once daily.       OMEGA 3-DHA-EPA-FISH OIL 1,000 (120-180) MG CAP    Take 1 capsule by mouth once daily.      OMEPRAZOLE 20 MG TBEC    1/2 tablet daily in am as needed.    PRAVASTATIN (PRAVACHOL) 10 MG TABLET    TAKE ONE TABLET BY MOUTH ONCE DAILY         CARE TEAM:  Patient Care Team:  Portia Ferreira MD as PCP - General (Internal Medicine)  Dustin Mccarthy III, MD as Consulting Physician (Orthopedic Surgery)  Pierre Rogers MD as Consulting Physician (Urology)           SCREENING HISTORY:  Health Maintenance       Date Due Completion Date    Urine Microalbumin 02/03/2018 2/3/2017    Hemoglobin A1c 07/23/2018 1/23/2018    Foot Exam 08/14/2018 8/14/2017    Eye Exam 11/10/2018 11/10/2017 (Done)    Override on 11/10/2017: Done (Dr. Silva)    Override on 3/9/2017: Done (Dr. Silva)    Override on 7/7/2016: Done (Dr. Silva)    Override on 2/3/2015: Done (Dr. Silva - no diabetic retinopathy)    Override on 6/27/2014: Done (Done by Dr. Silva)    Lipid Panel 01/23/2019 1/23/2018    TETANUS VACCINE 03/08/2025 3/8/2015    Colonoscopy 09/04/2025 9/4/2015    Override on 7/22/2015: Done ([facility]. Repeat in 5 years)    Override on 12/1/2005: Done            REVIEW OF SYSTEMS:   The patient reports good dietary habits.  The patient does exercise regularly.  Review of Systems   Constitutional: Negative for chills, fever, malaise/fatigue and weight loss.   HENT: Negative for congestion, ear pain, nosebleeds and sore throat.    Eyes: Negative for blurred vision, pain and discharge.   Respiratory: Negative for cough, hemoptysis, shortness of breath and wheezing.    Cardiovascular: Negative for chest pain, palpitations, claudication and leg swelling.   Gastrointestinal: Negative for abdominal pain, blood in stool, constipation, diarrhea, heartburn, melena, nausea and vomiting.   Genitourinary: Negative for dysuria, frequency, hematuria and urgency.   Musculoskeletal: Positive for falls (- he had a fall about 2 weeks ago -  "he does not remember what happened - he woke up on the ground - he had ER eval with negative CT of the head). Negative for joint pain and myalgias.   Skin: Negative for itching and rash.   Neurological: Negative for dizziness, tremors, focal weakness, seizures, weakness and headaches.   Endo/Heme/Allergies: Negative for polydipsia. Does not bruise/bleed easily.   Psychiatric/Behavioral: Negative for depression, memory loss, substance abuse and suicidal ideas. The patient is not nervous/anxious and does not have insomnia.            PHYSICAL EXAM:  Vitals:    01/29/18 0821   BP: 116/70   Pulse: (!) 55   Resp: 16   Temp: 97.6 °F (36.4 °C)     Weight: 76.8 kg (169 lb 5 oz)   Height: 5' 9" (175.3 cm)   Body mass index is 25 kg/m².    Physical Examination: General appearance - alert, well appearing, and in no distress and normal appearing weight  Mental status - alert, oriented to person, place, and time, normal mood, behavior, speech, dress, motor activity, and thought processes  Eyes - pupils equal and reactive, extraocular eye movements intact, sclera anicteric  Mouth - mucous membranes moist, pharynx normal without lesions  Neck - supple, no significant adenopathy, carotids upstroke normal bilaterally, no bruits, thyroid exam: thyroid is normal in size without nodules or tenderness  Lymphatics - no palpable lymphadenopathy  Chest - clear to auscultation, no wheezes, rales or rhonchi, symmetric air entry  Heart - normal rate and regular rhythm, no gallops noted  Abdomen - soft, nontender, nondistended, no masses or organomegaly   Male - not examined  Back exam - full range of motion, no tenderness, palpable spasm or pain on motion  Neurological - alert, oriented, normal speech, no focal findings or movement disorder noted, cranial nerves II through XII intact  Musculoskeletal - no joint tenderness, deformity or swelling, no muscular tenderness noted  Extremities - peripheral pulses normal, no pedal edema, no " clubbing or cyanosis  Skin - normal coloration and turgor, no rashes, no suspicious skin lesions noted       Results for orders placed or performed in visit on 01/23/18   Hemoglobin A1c   Result Value Ref Range    Hemoglobin A1C 5.5 4.0 - 5.6 %    Estimated Avg Glucose 111 68 - 131 mg/dL   Comprehensive metabolic panel   Result Value Ref Range    Sodium 142 136 - 145 mmol/L    Potassium 4.2 3.5 - 5.1 mmol/L    Chloride 104 95 - 110 mmol/L    CO2 30 (H) 23 - 29 mmol/L    Glucose 112 (H) 70 - 110 mg/dL    BUN, Bld 7 (L) 8 - 23 mg/dL    Creatinine 0.9 0.5 - 1.4 mg/dL    Calcium 9.7 8.7 - 10.5 mg/dL    Total Protein 7.7 6.0 - 8.4 g/dL    Albumin 4.0 3.5 - 5.2 g/dL    Total Bilirubin 0.9 0.1 - 1.0 mg/dL    Alkaline Phosphatase 65 55 - 135 U/L    AST 35 10 - 40 U/L    ALT 33 10 - 44 U/L    Anion Gap 8 8 - 16 mmol/L    eGFR if African American >60.0 >60 mL/min/1.73 m^2    eGFR if non African American >60.0 >60 mL/min/1.73 m^2   Lipid panel   Result Value Ref Range    Cholesterol 142 120 - 199 mg/dL    Triglycerides 83 30 - 150 mg/dL    HDL 45 40 - 75 mg/dL    LDL Cholesterol 80.4 63.0 - 159.0 mg/dL    HDL/Chol Ratio 31.7 20.0 - 50.0 %    Total Cholesterol/HDL Ratio 3.2 2.0 - 5.0    Non-HDL Cholesterol 97 mg/dL        ASSESSMENT AND PLAN:  1. Routine medical exam  Counseled on age appropriate medical preventative services including age appropriate cancer screenings, age appropriate eye and dental exams, over all nutritional health, need for a consistent exercise regimen, and an over all push towards maintaining a vigorous and active lifestyle.  Counseled on age appropriate vaccines and discussed upcoming health care needs based on age/gender. Discussed good sleep hygiene and stress management.     2. Type 2 diabetes, controlled, with neuropathy  Diabetes currently is controlled. We discussed diabetic diet and regular exercise. We discussed home blood sugar monitoring, if appropriate.  We discussed low sugar/low carbohydrate  diet and regular exercise to prevent progression. No need for prescription medication at this time.   He cannot tolerate ace inhibitors as they drop his BP too much and caused elevated potassium level.  - Microalbumin/creatinine urine ratio    3. Essential hypertension  Discussed sodium restriction, maintaining ideal body weight and regular exercise program as physiologic means to achieve blood pressure control. The patient will strive towards this. The current medical regimen is effective;  continue present plan and medications. Recommended patient to check home readings to monitor and see me for followup as scheduled or sooner as needed. Patient was educated that both decongestant and anti-inflammatory medication may raise blood pressure.     4. Hyperlipidemia LDL goal <100  We discussed low fat diet and regular exercise.The current medical regimen is effective;  continue present plan and medications.      5. Fall, subsequent encounter  I'm concerned about the fact that he had a fall where he does not remember what happened.  He does have low normal heart rate.  He has an appointment with cardiology in March.  I asked him to discuss it with his cardiologist as he may need a workup for bradycardia.  He will go to the emergency room for any future concerns.           Follow-up in about 6 months (around 7/29/2018), or if symptoms worsen or fail to improve, for follow up chronic medical conditions.. or sooner as needed.

## 2018-03-13 ENCOUNTER — OFFICE VISIT (OUTPATIENT)
Dept: CARDIOLOGY | Facility: CLINIC | Age: 78
End: 2018-03-13
Payer: MEDICARE

## 2018-03-13 VITALS
WEIGHT: 165.38 LBS | RESPIRATION RATE: 15 BRPM | SYSTOLIC BLOOD PRESSURE: 108 MMHG | BODY MASS INDEX: 24.5 KG/M2 | HEIGHT: 69 IN | DIASTOLIC BLOOD PRESSURE: 70 MMHG | HEART RATE: 64 BPM

## 2018-03-13 DIAGNOSIS — I10 HTN (HYPERTENSION): ICD-10-CM

## 2018-03-13 DIAGNOSIS — E11.40 TYPE 2 DIABETES, CONTROLLED, WITH NEUROPATHY: ICD-10-CM

## 2018-03-13 DIAGNOSIS — I10 ESSENTIAL HYPERTENSION: Primary | ICD-10-CM

## 2018-03-13 DIAGNOSIS — E78.5 HYPERLIPIDEMIA LDL GOAL <100: ICD-10-CM

## 2018-03-13 PROCEDURE — 3074F SYST BP LT 130 MM HG: CPT | Mod: CPTII,S$GLB,, | Performed by: INTERNAL MEDICINE

## 2018-03-13 PROCEDURE — 3078F DIAST BP <80 MM HG: CPT | Mod: CPTII,S$GLB,, | Performed by: INTERNAL MEDICINE

## 2018-03-13 PROCEDURE — 99214 OFFICE O/P EST MOD 30 MIN: CPT | Mod: S$GLB,,, | Performed by: INTERNAL MEDICINE

## 2018-03-13 PROCEDURE — 99499 UNLISTED E&M SERVICE: CPT | Mod: S$GLB,,, | Performed by: INTERNAL MEDICINE

## 2018-03-13 PROCEDURE — 99999 PR PBB SHADOW E&M-EST. PATIENT-LVL III: CPT | Mod: PBBFAC,,, | Performed by: INTERNAL MEDICINE

## 2018-03-13 PROCEDURE — 93000 ELECTROCARDIOGRAM COMPLETE: CPT | Mod: S$GLB,,, | Performed by: INTERNAL MEDICINE

## 2018-03-13 NOTE — PROGRESS NOTES
Subjective:    Patient ID:  Diego Gunter is a 77 y.o. male who presents for follow-up of Hypertension      HPI     HTN, DM, HLD, bradycardia     Referred by Dr Ferreira  Diego Gunter is a 76 y.o. male who presents to the clinic today for Diabetes  .  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  He does not check his blood pressures or blood sugars at home.  He reports good dietary habits.  He exercises regularly.  Unfortunately, his dysphagia is worsening again.  He has an appointment set up in the near future for reevaluation with speech therapy.  He recently had blood work done and is here today to discuss the results.  He does complain of some shortness of breath and fatigue.  He denies cardiac chest pain.  No abdominal pain, temperature intolerance, or unexplained changes in his weight.     Denies CP but gets SOB which has worsened some  SOB is at rest - goes to the gym regularly and does cardio without symptoms  No recent cardiac evaluation     Stress echo 8/31/17    1 - Normal left ventricular systolic function (EF 55-60%).     2 - Trivial tricuspid regurgitation.     3 - Mild pulmonic regurgitation.     No evidence of stress induced myocardial ischemia.     Mild dizziness if he stands too quickly  Mild KO  Denies CP  EKG NSR - ok    Review of Systems   Constitution: Negative for decreased appetite.   HENT: Negative for ear discharge.    Eyes: Negative for blurred vision.   Endocrine: Negative for polyphagia.   Skin: Negative for nail changes.   Neurological: Negative for aphonia.   Psychiatric/Behavioral: Negative for hallucinations.        Objective:    Physical Exam   Constitutional: He is oriented to person, place, and time. He appears well-developed and well-nourished.   HENT:   Head: Normocephalic and atraumatic.   Eyes: Conjunctivae are normal. Pupils are equal, round, and reactive to light.    Neck: Normal range of motion. Neck supple.   Cardiovascular: Normal rate, normal heart sounds and intact distal pulses.    Pulmonary/Chest: Effort normal and breath sounds normal.   Abdominal: Soft. Bowel sounds are normal.   Musculoskeletal: Normal range of motion.   Neurological: He is alert and oriented to person, place, and time.   Skin: Skin is warm and dry.         Assessment:       1. Essential hypertension    2. Hyperlipidemia LDL goal <100    3. Type 2 diabetes, controlled, with neuropathy         Plan:       Cardiac stable  OV 6 months

## 2018-03-17 DIAGNOSIS — E78.5 HYPERLIPIDEMIA LDL GOAL <100: ICD-10-CM

## 2018-03-18 RX ORDER — PRAVASTATIN SODIUM 10 MG/1
TABLET ORAL
Qty: 90 TABLET | Refills: 1 | Status: SHIPPED | OUTPATIENT
Start: 2018-03-18 | End: 2018-09-21 | Stop reason: SDUPTHER

## 2018-06-11 ENCOUNTER — TELEPHONE (OUTPATIENT)
Dept: FAMILY MEDICINE | Facility: CLINIC | Age: 78
End: 2018-06-11

## 2018-06-11 DIAGNOSIS — E11.40 TYPE 2 DIABETES, CONTROLLED, WITH NEUROPATHY: Primary | ICD-10-CM

## 2018-06-11 NOTE — TELEPHONE ENCOUNTER
----- Message from Aimee Gimenez sent at 6/11/2018 11:53 AM CDT -----  Contact: self  Pt is asking for orders for labs for 6 month f/u scheduled in Sept. Please call back at [phone]

## 2018-06-12 NOTE — TELEPHONE ENCOUNTER
Spoke with the patient, pend labs. Scheduled fasting appointment.  Patient verbalized understandings.

## 2018-07-10 ENCOUNTER — TELEPHONE (OUTPATIENT)
Dept: FAMILY MEDICINE | Facility: CLINIC | Age: 78
End: 2018-07-10

## 2018-09-14 ENCOUNTER — LAB VISIT (OUTPATIENT)
Dept: LAB | Facility: HOSPITAL | Age: 78
End: 2018-09-14
Attending: INTERNAL MEDICINE
Payer: MEDICARE

## 2018-09-14 DIAGNOSIS — E11.40 TYPE 2 DIABETES, CONTROLLED, WITH NEUROPATHY: ICD-10-CM

## 2018-09-14 LAB
ESTIMATED AVG GLUCOSE: 111 MG/DL
HBA1C MFR BLD HPLC: 5.5 %

## 2018-09-14 PROCEDURE — 83036 HEMOGLOBIN GLYCOSYLATED A1C: CPT

## 2018-09-14 PROCEDURE — 36415 COLL VENOUS BLD VENIPUNCTURE: CPT | Mod: PO

## 2018-09-21 ENCOUNTER — PATIENT MESSAGE (OUTPATIENT)
Dept: ADMINISTRATIVE | Facility: OTHER | Age: 78
End: 2018-09-21

## 2018-09-21 ENCOUNTER — OFFICE VISIT (OUTPATIENT)
Dept: FAMILY MEDICINE | Facility: CLINIC | Age: 78
End: 2018-09-21
Payer: MEDICARE

## 2018-09-21 VITALS
HEIGHT: 69 IN | SYSTOLIC BLOOD PRESSURE: 110 MMHG | HEART RATE: 49 BPM | WEIGHT: 166.88 LBS | TEMPERATURE: 98 F | OXYGEN SATURATION: 95 % | BODY MASS INDEX: 24.72 KG/M2 | DIASTOLIC BLOOD PRESSURE: 60 MMHG

## 2018-09-21 DIAGNOSIS — Z12.9 SCREENING FOR CANCER: ICD-10-CM

## 2018-09-21 DIAGNOSIS — E78.5 HYPERLIPIDEMIA LDL GOAL <100: ICD-10-CM

## 2018-09-21 DIAGNOSIS — Z23 NEED FOR PROPHYLACTIC VACCINATION AND INOCULATION AGAINST INFLUENZA: ICD-10-CM

## 2018-09-21 DIAGNOSIS — I10 ESSENTIAL HYPERTENSION: ICD-10-CM

## 2018-09-21 DIAGNOSIS — R05.9 COUGH: ICD-10-CM

## 2018-09-21 DIAGNOSIS — E11.40 TYPE 2 DIABETES, CONTROLLED, WITH NEUROPATHY: Primary | ICD-10-CM

## 2018-09-21 DIAGNOSIS — Z71.89 ADVANCED DIRECTIVES, COUNSELING/DISCUSSION: ICD-10-CM

## 2018-09-21 DIAGNOSIS — R13.10 DYSPHAGIA, UNSPECIFIED TYPE: ICD-10-CM

## 2018-09-21 PROCEDURE — 99999 PR PBB SHADOW E&M-EST. PATIENT-LVL III: CPT | Mod: PBBFAC,,, | Performed by: INTERNAL MEDICINE

## 2018-09-21 PROCEDURE — 99213 OFFICE O/P EST LOW 20 MIN: CPT | Mod: PBBFAC,PO | Performed by: INTERNAL MEDICINE

## 2018-09-21 PROCEDURE — 3078F DIAST BP <80 MM HG: CPT | Mod: CPTII,,, | Performed by: INTERNAL MEDICINE

## 2018-09-21 PROCEDURE — 3074F SYST BP LT 130 MM HG: CPT | Mod: CPTII,,, | Performed by: INTERNAL MEDICINE

## 2018-09-21 PROCEDURE — 90662 IIV NO PRSV INCREASED AG IM: CPT | Mod: PBBFAC,PO

## 2018-09-21 PROCEDURE — 1101F PT FALLS ASSESS-DOCD LE1/YR: CPT | Mod: CPTII,,, | Performed by: INTERNAL MEDICINE

## 2018-09-21 PROCEDURE — 99214 OFFICE O/P EST MOD 30 MIN: CPT | Mod: S$PBB,,, | Performed by: INTERNAL MEDICINE

## 2018-09-21 RX ORDER — PRAVASTATIN SODIUM 10 MG/1
10 TABLET ORAL DAILY
Qty: 90 TABLET | Refills: 1 | Status: SHIPPED | OUTPATIENT
Start: 2018-09-21 | End: 2019-03-01 | Stop reason: SDUPTHER

## 2018-09-21 RX ORDER — PHENOL/SODIUM PHENOLATE
AEROSOL, SPRAY (ML) MUCOUS MEMBRANE
Qty: 45 EACH | Refills: 0 | Status: SHIPPED | OUTPATIENT
Start: 2018-09-21 | End: 2022-07-08

## 2018-09-21 NOTE — PROGRESS NOTES
HISTORY OF PRESENT ILLNESS:  Diego Gunter is a 78 y.o. male who presents to the clinic today for Diabetes  .   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  Diabetes: The patient reports that they  do not check blood sugars at home. The patient  denies any problems with low blood sugars. The patient  reports that they have been complaint with current treatment plan (diet, exercise; currently no need for prescription medication).  Hypertension: The patient reports that they check their blood sugars at home  regularly and blood pressure is generally well controlled (<= 139/89).  The patient  is not enrolled in the digital hypertension program. The patient denies  cardiac chest pain, shortness of breath, lower extremity edema, headaches and side effects of medication. The patient reports problems with  none.  The patient  does follow a low salt diet. The patient  is currently not on any blood pressure medication..  Hyperlipidemia: Patient reports that they have been compliant with low fat diet and regular exercise. Patient reports that they have been compliant with their medication regimen and deny any problems/side effects from the medication.  The patient's primary complaint today is persistent cough.  He denies any nasal congestion or other respiratory symptoms.  Cough is productive of some mucus.  He is a remote smoker.  He has had problems with dysphagia in the past.  He denies any problems with swallowing at this time.  No recent problems with weight loss.  No hemoptysis or hematemesis.      PAST MEDICAL HISTORY:  Past Medical History:   Diagnosis Date    Hx of colonic polyps     followed by GI. Dr. Pham    Hyperlipidemia LDL goal <100     Overweight(278.02)     Prostate cancer september 2011    followed by urology, Dr. Rogers    Type II or unspecified type diabetes mellitus without mention of  complication, not stated as uncontrolled     diet controlled       PAST SURGICAL HISTORY:  Past Surgical History:   Procedure Laterality Date    CATARACT EXTRACTION, BILATERAL  2014    ESOPHAGOGASTRODUODENOSCOPY (EGD) N/A 2/25/2016    Performed by [facility] at [facility]    PROSTATECTOMY         SOCIAL HISTORY:  Social History     Socioeconomic History    Marital status:      Spouse name: Not on file    Number of children: 2    Years of education: Not on file    Highest education level: Not on file   Social Needs    Financial resource strain: Not on file    Food insecurity - worry: Not on file    Food insecurity - inability: Not on file    Transportation needs - medical: Not on file    Transportation needs - non-medical: Not on file   Occupational History    Occupation: tool    Tobacco Use    Smoking status: Former Smoker    Smokeless tobacco: Never Used    Tobacco comment: quit age 23   Substance and Sexual Activity    Alcohol use: Yes     Comment: beer occasionally    Drug use: No    Sexual activity: Yes     Partners: Female   Other Topics Concern    Not on file   Social History Narrative    Not on file       FAMILY HISTORY:  Family History   Problem Relation Age of Onset    Colon cancer Mother     Diabetes Mother     Heart disease Father     Mental illness Sister     Diabetes Sister     Other Brother         polio as a child    Diabetes Unknown     Diabetes Brother     Myasthenia gravis Brother     Parkinsonism Brother     Diabetes Brother     Dementia Brother     Lung cancer Brother         smoker    Diabetes Maternal Aunt        ALLERGIES AND MEDICATIONS: updated and reviewed.  Review of patient's allergies indicates:  No Known Allergies     Medication List           Accurate as of 9/21/18  6:15 PM. If you have any questions, ask your nurse or doctor.               CHANGE how you take these medications    pravastatin 10 MG tablet  Commonly known as:   PRAVACHOL  Take 1 tablet (10 mg total) by mouth once daily.  What changed:  See the new instructions.  Changed by:  Portia Ferreira MD        CONTINUE taking these medications    omega 3-dha-epa-fish oil 1,000 mg (120 mg-180 mg) Cap     omeprazole 20 mg Tbec  1/2 tablet daily in am as needed.     VITAMIN C 1000 MG tablet  Generic drug:  ascorbic acid (vitamin C)           Where to Get Your Medications      These medications were sent to [facility] - [place] ([place], [place] [address]  [address], [place] ([place] [address]    Phone:  [phone]   · omeprazole 20 mg Tbec  · pravastatin 10 MG tablet            CARE TEAM:  Patient Care Team:  Portia Ferreira MD as PCP - General (Internal Medicine)  Dustin Mccarthy III, MD as Consulting Physician (Orthopedic Surgery)  Pierre Rogers MD as Consulting Physician (Urology)         REVIEW OF SYSTEMS:  Review of Systems   Constitutional: Negative for chills, fatigue, fever and unexpected weight change.   HENT: Negative for congestion, ear pain, sore throat and voice change.    Eyes: Negative for photophobia, pain, discharge and visual disturbance.   Respiratory: Positive for cough. Negative for shortness of breath and wheezing.    Cardiovascular: Negative for chest pain, palpitations and leg swelling.   Gastrointestinal: Negative for abdominal pain, blood in stool, constipation, diarrhea, nausea and vomiting.   Genitourinary: Negative for dysuria and frequency.   Musculoskeletal: Negative for gait problem, joint swelling and neck stiffness.   Skin: Negative for color change and rash.   Neurological: Negative for seizures, weakness and headaches.   Hematological: Negative for adenopathy. Does not bruise/bleed easily.   Psychiatric/Behavioral: Negative for behavioral problems and dysphoric mood. The patient is not nervous/anxious.          PHYSICAL EXAM:  Vitals:    09/21/18 1019   BP: 110/60   Pulse: (!) 49   Temp: 97.5 °F (36.4 °C)  "    Weight: 75.7 kg (166 lb 14.2 oz)   Height: 5' 9" (175.3 cm)   Body mass index is 24.65 kg/m².    Physical Examination: General appearance - alert, well appearing, and in no distress and normal appearing weight  Mental status - alert, oriented to person, place, and time, normal mood, behavior, speech, dress, motor activity, and thought processes  Eyes - pupils equal and reactive, extraocular eye movements intact, sclera anicteric  Ears - bilateral TM's and external ear canals normal  Nose - normal and patent, no erythema, discharge or polyps and normal nontender sinuses  Mouth - mucous membranes moist, pharynx normal without lesions  Neck - supple, no significant adenopathy, carotids upstroke normal bilaterally, no bruits  Lymphatics - no palpable lymphadenopathy  Chest - clear to auscultation, no wheezes, rales or rhonchi, symmetric air entry  Heart - normal rate and regular rhythm, no gallops noted  Abdomen - soft, nontender, nondistended, no masses or organomegaly  Back exam - full range of motion, no tenderness, palpable spasm or pain on motion  Neurological - alert, oriented, normal speech, no focal findings or movement disorder noted, cranial nerves II through XII intact  Musculoskeletal - no joint tenderness, deformity or swelling, no muscular tenderness noted  Extremities - peripheral pulses normal, no pedal edema, no clubbing or cyanosis  Skin - normal coloration and turgor, no rashes, no suspicious skin lesions noted       Protective Sensation (w/ 10 gram monofilament):  Right: Intact  Left: Intact    Visual Inspection:  Normal -  Bilateral  Except mild dryness between some of the toes    Pedal Pulses:   Right: Present  Left: Present    Posterior tibialis:   Right:Present  Left: Present       Lab Results   Component Value Date    HGBA1C 5.5 09/14/2018    HGBA1C 5.5 01/23/2018    HGBA1C 5.4 08/08/2017      Lab Results   Component Value Date    CHOL 142 01/23/2018    CHOL 141 01/26/2017    CHOL 130 " 07/26/2016     Lab Results   Component Value Date    LDLCALC 80.4 01/23/2018    LDLCALC 71.6 01/26/2017    LDLCALC 69.4 07/26/2016          ASSESSMENT AND PLAN:  1. Type 2 diabetes, controlled, with neuropathy  Diabetes currently is controlled for age and comorbid conditions. We discussed diabetic diet and regular exercise. We discussed home blood sugar monitoring, if appropriate. We discussed low sugar/low carbohydrate diet and regular exercise to prevent progression. No need for prescription medication at this time.  Diabetic complications addressed: Neuropathy pain controlled.  Patient was counseled on the need for yearly diabetic retinopathy exam and yearly diabetic foot exam.     2. Essential hypertension  Discussed sodium restriction, maintaining ideal body weight and regular exercise program as physiologic means to achieve blood pressure control. The patient will strive towards this. The current medical regimen is effective;  continue present plan and medications. Recommended patient to check home readings to monitor and see me for followup as scheduled or sooner as needed. Patient was educated that both decongestant and anti-inflammatory medication may raise blood pressure.   - Hypertension [facility] ([facility]) Enrollment Order  - Hypertension [facility] ([facility]): Assign Onboarding Questionnaires    3. Hyperlipidemia LDL goal <100  We discussed low fat diet and regular exercise.The current medical regimen is effective;  continue present plan and medications.    - pravastatin (PRAVACHOL) 10 MG tablet; Take 1 tablet (10 mg total) by mouth once daily.  Dispense: 90 tablet; Refill: 1    4. Dysphagia, unspecified type  Stable. Medication as needed.   - omeprazole 20 mg TbEC; 1/2 tablet daily in am as needed.  Dispense: 45 each; Refill: 0    5. Cough/6. Screening for cancer  I do not know if this is due to a respiratory issue or his history of swallowing difficulty/reflux.  Will check a CT scan of the  chest.  Consider referral to ENT if symptoms worsen or persist.  - CT Chest Lung Screening Low Dose; Future    7. Need for prophylactic vaccination and inoculation against influenza    - Flu Vaccine - High Dose (PF) (65+)     8.  Advance directive counseling  I had a discussion today with the patient and wife regarding advanced directives.  Advanced directives were discussed due to patient's age and/or chronic medical conditions. Prognosis based on current condition: good. Paperwork was given to complete living will and medical POA. [place] was LW discussed/not discussed [place]: not discussed. Approximately 5 minute(s) spent on counseling/discussion regarding end of life decision making.         Follow-up in about 6 months (around 3/21/2019), or if symptoms worsen or fail to improve, for annual exam. or sooner as needed.

## 2018-09-27 ENCOUNTER — HOSPITAL ENCOUNTER (OUTPATIENT)
Dept: RADIOLOGY | Facility: HOSPITAL | Age: 78
Discharge: HOME | End: 2018-09-27
Attending: INTERNAL MEDICINE

## 2018-09-27 ENCOUNTER — TELEPHONE (OUTPATIENT)
Dept: FAMILY MEDICINE | Facility: CLINIC | Age: 78
End: 2018-09-27

## 2018-09-27 DIAGNOSIS — Z12.9 SCREENING FOR CANCER: ICD-10-CM

## 2018-09-27 DIAGNOSIS — R93.89 ABNORMAL CT OF THE CHEST: Primary | ICD-10-CM

## 2018-09-27 PROBLEM — J43.8 OTHER EMPHYSEMA: Status: ACTIVE | Noted: 2018-09-27

## 2018-09-27 PROBLEM — I70.0 ATHEROSCLEROSIS OF AORTA: Status: ACTIVE | Noted: 2018-09-27

## 2018-09-27 PROCEDURE — G0297 LDCT FOR LUNG CA SCREEN: HCPCS | Mod: TC

## 2018-09-27 NOTE — TELEPHONE ENCOUNTER
Spoke with patient about results. He has some emphysema but he is no longer smoking. Will refer him to pulmonary for further evaluation of the findings.

## 2018-10-01 ENCOUNTER — PATIENT MESSAGE (OUTPATIENT)
Dept: FAMILY MEDICINE | Facility: CLINIC | Age: 78
End: 2018-10-01

## 2018-10-02 ENCOUNTER — PATIENT MESSAGE (OUTPATIENT)
Dept: FAMILY MEDICINE | Facility: CLINIC | Age: 78
End: 2018-10-02

## 2018-10-02 ENCOUNTER — OFFICE VISIT (OUTPATIENT)
Dept: SLEEP MEDICINE | Facility: CLINIC | Age: 78
End: 2018-10-02
Payer: MEDICARE

## 2018-10-02 ENCOUNTER — LAB VISIT (OUTPATIENT)
Dept: LAB | Facility: HOSPITAL | Age: 78
End: 2018-10-02
Attending: INTERNAL MEDICINE
Payer: MEDICARE

## 2018-10-02 VITALS
BODY MASS INDEX: 24.59 KG/M2 | SYSTOLIC BLOOD PRESSURE: 124 MMHG | HEART RATE: 49 BPM | HEIGHT: 69 IN | WEIGHT: 166 LBS | DIASTOLIC BLOOD PRESSURE: 83 MMHG | OXYGEN SATURATION: 97 %

## 2018-10-02 DIAGNOSIS — J98.4 CAVITARY LESION OF LUNG: ICD-10-CM

## 2018-10-02 DIAGNOSIS — J98.4 CAVITARY LESION OF LUNG: Primary | ICD-10-CM

## 2018-10-02 DIAGNOSIS — Z11.4 ENCOUNTER FOR SCREENING FOR HIV: ICD-10-CM

## 2018-10-02 DIAGNOSIS — J18.9 PNEUMONIA OF RIGHT UPPER LOBE DUE TO INFECTIOUS ORGANISM: ICD-10-CM

## 2018-10-02 LAB
BASOPHILS # BLD AUTO: 0.05 K/UL
BASOPHILS NFR BLD: 0.8 %
DIFFERENTIAL METHOD: ABNORMAL
EOSINOPHIL # BLD AUTO: 0.1 K/UL
EOSINOPHIL NFR BLD: 0.8 %
ERYTHROCYTE [DISTWIDTH] IN BLOOD BY AUTOMATED COUNT: 14.1 %
HCT VFR BLD AUTO: 45 %
HGB BLD-MCNC: 14.3 G/DL
IMM GRANULOCYTES # BLD AUTO: 0.02 K/UL
IMM GRANULOCYTES NFR BLD AUTO: 0.3 %
LYMPHOCYTES # BLD AUTO: 1.9 K/UL
LYMPHOCYTES NFR BLD: 29.8 %
MCH RBC QN AUTO: 28.5 PG
MCHC RBC AUTO-ENTMCNC: 31.8 G/DL
MCV RBC AUTO: 90 FL
MONOCYTES # BLD AUTO: 0.6 K/UL
MONOCYTES NFR BLD: 8.7 %
NEUTROPHILS # BLD AUTO: 3.8 K/UL
NEUTROPHILS NFR BLD: 59.6 %
NRBC BLD-RTO: 0 /100 WBC
PLATELET # BLD AUTO: 254 K/UL
PMV BLD AUTO: 10.6 FL
RBC # BLD AUTO: 5.02 M/UL
WBC # BLD AUTO: 6.41 K/UL

## 2018-10-02 PROCEDURE — 99499 UNLISTED E&M SERVICE: CPT | Mod: S$GLB,,, | Performed by: INTERNAL MEDICINE

## 2018-10-02 PROCEDURE — 3288F FALL RISK ASSESSMENT DOCD: CPT | Mod: CPTII,,, | Performed by: INTERNAL MEDICINE

## 2018-10-02 PROCEDURE — 99999 PR PBB SHADOW E&M-EST. PATIENT-LVL III: CPT | Mod: PBBFAC,,, | Performed by: INTERNAL MEDICINE

## 2018-10-02 PROCEDURE — 3079F DIAST BP 80-89 MM HG: CPT | Mod: CPTII,,, | Performed by: INTERNAL MEDICINE

## 2018-10-02 PROCEDURE — 99213 OFFICE O/P EST LOW 20 MIN: CPT | Mod: PBBFAC,PO | Performed by: INTERNAL MEDICINE

## 2018-10-02 PROCEDURE — 1100F PTFALLS ASSESS-DOCD GE2>/YR: CPT | Mod: CPTII,,, | Performed by: INTERNAL MEDICINE

## 2018-10-02 PROCEDURE — 99204 OFFICE O/P NEW MOD 45 MIN: CPT | Mod: S$PBB,,, | Performed by: INTERNAL MEDICINE

## 2018-10-02 PROCEDURE — 36415 COLL VENOUS BLD VENIPUNCTURE: CPT | Mod: PO

## 2018-10-02 PROCEDURE — 86703 HIV-1/HIV-2 1 RESULT ANTBDY: CPT

## 2018-10-02 PROCEDURE — 3074F SYST BP LT 130 MM HG: CPT | Mod: CPTII,,, | Performed by: INTERNAL MEDICINE

## 2018-10-02 PROCEDURE — 85025 COMPLETE CBC W/AUTO DIFF WBC: CPT

## 2018-10-02 NOTE — PROGRESS NOTES
Diego Gunter  was seen as a new patient for the evaluation of  Abnormal CT chest.    CHIEF COMPLAINT:  Consult      HISTORY OF PRESENT ILLNESS: Diego Gunter is a 78 y.o. male  has a past medical history of Dysphagia, colonic polyps, Hyperlipidemia LDL goal <100, Overweight(278.02), Prostate cancer (september 2011), and Type II or unspecified type diabetes mellitus without mention of complication, not stated as uncontrolled.  Patient was diagnosed with prostate in 2011 s/p prostatectomy.  Patient with remote smoking history and quit in 1962.  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      Patient with persistent productive cough x 1 year.  +dark brown phlegm.  No hemoptysis.  Patient underwent CT of chest by pcp which revealed rul cavitary lesion.  Patient is here for pulmonary evaluation.  +weight loss 30 lbs since 2016.      Patient routinely exercise with treadmill, ellipitical 4 times per week for 1 hour session without issue.      PAST MEDICAL HISTORY:    Active Ambulatory Problems     Diagnosis Date Noted    History of prostate cancer 09/01/2011    Hx of colonic polyps     Type 2 diabetes, controlled, with neuropathy     Allergic rhinitis 07/07/2014    Hypertension 07/07/2014    Anxiety state, unspecified 07/14/2014    Hyperlipidemia LDL goal <100     Dysphagia, oropharyngeal 06/17/2016    Atherosclerosis of aorta 09/27/2018    Other emphysema 09/27/2018     Resolved Ambulatory Problems     Diagnosis Date Noted    Overweight (BMI 25.0-29.9)     Occipital neuralgia 07/14/2014    Headache(784.0) 07/14/2014    Dog scratch 03/08/2015    Cellulitis of arm, right 03/08/2015     Past Medical History:   Diagnosis Date    Dysphagia     Hx of colonic polyps     Hyperlipidemia LDL goal <100     Overweight(278.02)     Prostate cancer september 2011    Type II or unspecified type diabetes mellitus without mention of complication, not stated as uncontrolled                  PAST SURGICAL HISTORY:    Past Surgical History:   Procedure Laterality Date    CATARACT EXTRACTION, BILATERAL      ESOPHAGOGASTRODUODENOSCOPY (EGD) N/A 2016    [facility]    PROSTATECTOMY           FAMILY HISTORY:                Family History   Problem Relation Age of Onset    Colon cancer Mother     Diabetes Mother     Heart disease Father     Mental illness Sister     Diabetes Sister     Other Brother         polio as a child    Diabetes Unknown     Diabetes Brother     Myasthenia gravis Brother     Parkinsonism Brother     Diabetes Brother     Dementia Brother     Lung cancer Brother         smoker    Diabetes Maternal Aunt        SOCIAL HISTORY:          Tobacco:   Social History     Tobacco Use   Smoking Status Former Smoker    Packs/day: 0.25    Years: 5.00    Pack years: 1.25    Last attempt to quit: 10/2/1962    Years since quittin.0   Smokeless Tobacco Never Used   Tobacco Comment    quit age 23     alcohol use:    Social History     Substance and Sexual Activity   Alcohol Use Yes    Comment: beer occasionally               Occupation:  Former [employer] worker    ALLERGIES:  Review of patient's allergies indicates:  No Known Allergies    CURRENT MEDICATIONS:    Current Outpatient Medications   Medication Sig Dispense Refill    ascorbic acid (VITAMIN C) 1000 MG tablet Take 1,000 mg by mouth once daily.        omega 3-dha-epa-fish oil 1,000 (120-180) mg Cap Take 1 capsule by mouth once daily.        omeprazole 20 mg TbEC 1/2 tablet daily in am as needed. 45 each 0    pravastatin (PRAVACHOL) 10 MG tablet Take 1 tablet (10 mg total) by mouth once daily. 90 tablet 1     No current facility-administered medications for this visit.                   REVIEW OF SYSTEMS:     Pulmonary related symptoms as per HPI.  Gen:  no weight loss, no fever, no night sweat  HEENT:  no visual changes, no sore throat, no hearing loss  CV:  No chest  "pain, no orthopnea, no PND  GI:  no melena, no hematochezia, no diarhea, no constipation.  H/o dysphagia  :  no dysuria, no hematuria, no hesistancy, no dribbling  Neuro:  no syncope, no vertigo, no tinitus  Psych:  No homocide or suicide ideation; no depression.  Endocrine:  No heat or cold intolerance.  Sleep:  No snoring; no witnessed apnea.  Otherwise, a balance of systems reviewed is negative.          PHYSICAL EXAM:  Vitals:    10/02/18 1259   BP: 124/83   Pulse: (!) 49   SpO2: 97%   Weight: 75.3 kg (166 lb)   Height: 5' 9" (1.753 m)   PainSc: 0-No pain     Body mass index is 24.51 kg/m².     GENERAL:  well develop; no apparent distress  HEENT:  no nasal congestion; no discharge noted; class 3 modified mallampatti.   NECK:  supple; no palpable masses.  CARDIO: regular rate and rhythm  PULM:  clear to auscultation bilaterally; no intercostals retractions; no accessory muscle usage   ABDOMEN:  soft nontender/nondistended.  +bowel sound  EXTREMITIES no cce  NEURO:  CN II-XII intact.  5/5 motor in all extremities.  sensation grossly intact   to light touch.  PSYCH:  normal affect.  Alert and oriented x 4    LABS  Pulmonary Functions Testing Results: none  ABG none  CXR:  8/5/15 no effusion or consoliation  CT CHEST:  9/21/18 bilateral emphysematous changes.  RUL cavitary lesion (new when compared to 2016 ct of neck)    ASSESSMENT    ICD-10-CM ICD-9-CM    1. Cavitary lesion of lung J98.4 518.89 KOH prep      AFB Culture & Smear      Quantiferon Gold TB      Stress test, pulmonary      Spirometry without Bronchodilator      DLCO-Carbon Monoxide Diffusing Capacity      LUNG VOLUMES      CBC auto differential   2. Pneumonia of right upper lobe due to infectious organism J18.1 486 HIV-1 and HIV-2 antibodies   3. Encounter for screening for HIV  Z11.4 V73.89 HIV-1 and HIV-2 antibodies       PLAN:  Cavitary lung disease - rul posterior segment.  DDx:  Infection vs malignancy.  Will send for sputum culture, quantiferon. "  If culture is non-revealing, then will proceed with fob.      Copd - emphysematous changes on ct.  Minimal smoking history.  However, patient spent ~40 years working as .  Baseline pft.    Patient will Follow-up in about 4 weeks (around 10/30/2018). with md.

## 2018-10-03 ENCOUNTER — LAB VISIT (OUTPATIENT)
Dept: LAB | Facility: HOSPITAL | Age: 78
End: 2018-10-03
Attending: INTERNAL MEDICINE
Payer: MEDICARE

## 2018-10-03 DIAGNOSIS — J98.4 CAVITARY LESION OF LUNG: ICD-10-CM

## 2018-10-03 LAB
HIV 1+2 AB+HIV1 P24 AG SERPL QL IA: NEGATIVE
KOH PREP SPEC: NORMAL

## 2018-10-03 PROCEDURE — 87206 SMEAR FLUORESCENT/ACID STAI: CPT

## 2018-10-03 PROCEDURE — 87186 SC STD MICRODIL/AGAR DIL: CPT

## 2018-10-03 PROCEDURE — 87118 MYCOBACTERIC IDENTIFICATION: CPT

## 2018-10-03 PROCEDURE — 87015 SPECIMEN INFECT AGNT CONCNTJ: CPT

## 2018-10-03 PROCEDURE — 87149 DNA/RNA DIRECT PROBE: CPT

## 2018-10-03 PROCEDURE — 87116 MYCOBACTERIA CULTURE: CPT

## 2018-10-03 PROCEDURE — 87210 SMEAR WET MOUNT SALINE/INK: CPT

## 2018-10-04 ENCOUNTER — HOSPITAL ENCOUNTER (OUTPATIENT)
Dept: RESPIRATORY THERAPY | Facility: HOSPITAL | Age: 78
Discharge: HOME | End: 2018-10-04
Attending: INTERNAL MEDICINE
Payer: MEDICARE

## 2018-10-04 DIAGNOSIS — J98.4 CAVITARY LESION OF LUNG: ICD-10-CM

## 2018-10-04 PROCEDURE — 94729 DIFFUSING CAPACITY: CPT

## 2018-10-04 PROCEDURE — 94010 BREATHING CAPACITY TEST: CPT

## 2018-10-04 PROCEDURE — 94727 GAS DIL/WSHOT DETER LNG VOL: CPT

## 2018-10-06 ENCOUNTER — PATIENT MESSAGE (OUTPATIENT)
Dept: PULMONOLOGY | Facility: CLINIC | Age: 78
End: 2018-10-06

## 2018-10-06 ENCOUNTER — NURSE TRIAGE (OUTPATIENT)
Dept: ADMINISTRATIVE | Facility: CLINIC | Age: 78
End: 2018-10-06

## 2018-10-06 ENCOUNTER — OFFICE VISIT (OUTPATIENT)
Dept: URGENT CARE | Facility: CLINIC | Age: 78
End: 2018-10-06
Payer: MEDICARE

## 2018-10-06 VITALS
HEART RATE: 61 BPM | RESPIRATION RATE: 16 BRPM | OXYGEN SATURATION: 97 % | SYSTOLIC BLOOD PRESSURE: 111 MMHG | TEMPERATURE: 98 F | DIASTOLIC BLOOD PRESSURE: 76 MMHG | HEIGHT: 69 IN | WEIGHT: 166 LBS | BODY MASS INDEX: 24.59 KG/M2

## 2018-10-06 DIAGNOSIS — J18.9 PNEUMONIA DUE TO INFECTIOUS ORGANISM, UNSPECIFIED LATERALITY, UNSPECIFIED PART OF LUNG: ICD-10-CM

## 2018-10-06 DIAGNOSIS — R04.2 BLOOD IN SPUTUM: Primary | ICD-10-CM

## 2018-10-06 LAB
BRPFT: ABNORMAL
DLCO ADJ PRE: 39.22 ML/(MIN*MMHG) (ref 18.14–32)
DLCO SINGLE BREATH LLN: 18.14
DLCO SINGLE BREATH PRE REF: 156.4 %
DLCO SINGLE BREATH REF: 25.07
DLCOC SBVA LLN: 2.46
DLCOC SBVA PRE REF: 112.1 %
DLCOC SBVA REF: 3.63
DLCOC SINGLE BREATH LLN: 18.14
DLCOC SINGLE BREATH PRE REF: 156.4 %
DLCOC SINGLE BREATH REF: 25.07
DLCOVA LLN: 2.46
DLCOVA PRE REF: 112.1 %
DLCOVA PRE: 4.07 ML/(MIN*MMHG*L) (ref 2.46–4.81)
DLCOVA REF: 3.63
DLVAADJ PRE: 4.07 ML/(MIN*MMHG*L) (ref 2.46–4.81)
ERV LLN: 0.95
ERV REF: 0.95
ERVN2 LLN: 0.95
ERVN2 REF: 0.95
FEF 25 75 LLN: 0.85
FEF 25 75 PRE REF: 126.3 %
FEF 25 75 REF: 2.12
FEV1 FVC LLN: 61
FEV1 FVC PRE REF: 109.2 %
FEV1 FVC REF: 75
FEV1 LLN: 2.04
FEV1 PRE REF: 117.6 %
FEV1 REF: 2.9
FEV6 LLN: 2.87
FEV6 REF: 3.75
FRCN2 LLN: 2.7
FRCN2 PRE REF: 129.1 %
FRCN2 REF: 3.69
FRCPLETH LLN: 2.7
FRCPLETH REF: 3.69
FVC LLN: 2.83
FVC PRE REF: 107 %
FVC REF: 3.88
IVC PRE: 1.15 L (ref 2.83–4.92)
IVC SINGLE BREATH LLN: 2.83
IVC SINGLE BREATH PRE REF: 29.7 %
IVC SINGLE BREATH REF: 3.88
MVV LLN: 96
MVV REF: 113
PEF LLN: 5.23
PEF PRE REF: 92.5 %
PEF REF: 7.47
PRE DLCO: 39.22 ML/(MIN*MMHG) (ref 18.14–32)
PRE FEF 25 75: 2.68 L/S (ref 0.85–3.39)
PRE FET 100: 3.5 SEC
PRE FEV1 FVC: 82.28 % (ref 61.07–89.6)
PRE FEV1: 3.41 L (ref 2.04–3.76)
PRE FRC N2: 4.76 L
PRE FVC: 4.15 L (ref 2.83–4.92)
PRE PEF: 6.91 L/S (ref 5.23–9.71)
RAW LLN: 3.06
RAW REF: 3.06
RV LLN: 2.06
RV REF: 2.73
RVN2 LLN: 2.06
RVN2 REF: 2.73
RVN2TLCN2 LLN: 35
RVN2TLCN2 REF: 44
RVTLC LLN: 35
RVTLC REF: 44
TLC LLN: 5.75
TLC REF: 6.9
TLCN2 LLN: 5.75
TLCN2 REF: 6.9
VA PRE: 8.84 L (ref 6.75–6.75)
VA SINGLE BREATH LLN: 6.75
VA SINGLE BREATH PRE REF: 131 %
VA SINGLE BREATH REF: 6.75
VC LLN: 2.83
VC REF: 3.88
VCMAXN2 LLN: 2.83
VCMAXN2 PRE REF: 107 %
VCMAXN2 PRE: 4.15 L (ref 2.83–4.92)
VCMAXN2 REF: 3.88

## 2018-10-06 PROCEDURE — 3078F DIAST BP <80 MM HG: CPT | Mod: CPTII,S$GLB,, | Performed by: NURSE PRACTITIONER

## 2018-10-06 PROCEDURE — 3288F FALL RISK ASSESSMENT DOCD: CPT | Mod: CPTII,S$GLB,, | Performed by: NURSE PRACTITIONER

## 2018-10-06 PROCEDURE — 71046 X-RAY EXAM CHEST 2 VIEWS: CPT | Mod: S$GLB,,, | Performed by: RADIOLOGY

## 2018-10-06 PROCEDURE — 99214 OFFICE O/P EST MOD 30 MIN: CPT | Mod: S$GLB,,, | Performed by: NURSE PRACTITIONER

## 2018-10-06 PROCEDURE — 1100F PTFALLS ASSESS-DOCD GE2>/YR: CPT | Mod: CPTII,S$GLB,, | Performed by: NURSE PRACTITIONER

## 2018-10-06 PROCEDURE — 3074F SYST BP LT 130 MM HG: CPT | Mod: CPTII,S$GLB,, | Performed by: NURSE PRACTITIONER

## 2018-10-06 RX ORDER — LEVOFLOXACIN 750 MG/1
750 TABLET ORAL DAILY
Qty: 5 TABLET | Refills: 0 | Status: SHIPPED | OUTPATIENT
Start: 2018-10-06 | End: 2018-10-11

## 2018-10-06 NOTE — TELEPHONE ENCOUNTER
Reason for Disposition   Coughing up nathanael-colored sputum    Protocols used: ST COUGHING UP BLOOD-A-AH    Pt states he coughed up blood with brown mucous 3 times this morning. Denies any other symptoms. No appts available today, urgent care recommended.

## 2018-10-06 NOTE — PATIENT INSTRUCTIONS
Pneumonia (Adult)  Pneumonia is an infection deep within the lungs. It is in the small air sacs (alveoli). Pneumonia may be caused by a virus or bacteria. Pneumonia caused by bacteria is usually treated with an antibiotic. Severe cases may need to be treated in the hospital. Milder cases can be treated at home. Symptoms usually start to get better during the first 2 days of treatment.    Home care  Follow these guidelines when caring for yourself at home:  · Rest at home for the first 2 to 3 days, or until you feel stronger. Dont let yourself get overly tired when you go back to your activities.  · Stay away from cigarette smoke - yours or other peoples.  · You may use acetaminophen or ibuprofen to control fever or pain, unless another medicine was prescribed. If you have chronic liver or kidney disease, talk with your healthcare provider before using these medicines. Also talk with your provider if youve had a stomach ulcer or gastrointestinal bleeding. Dont give aspirin to anyone younger than 18 years of age who is ill with a fever. It may cause severe liver damage.  · Your appetite may be poor, so a light diet is fine.  · Drink 6 to 8 glasses of fluids every day to make sure you are getting enough fluids. Beverages can include water, sport drinks, sodas without caffeine, juices, tea, or soup. Fluids will help loosen secretions in the lung. This will make it easier for you to cough up the phlegm (sputum). If you also have heart or kidney disease, check with your healthcare provider before you drink extra fluids.  · Take antibiotic medicine prescribed until it is all gone, even if you are feeling better after a few days.  Follow-up care  Follow up with your healthcare provider in the next 2 to 3 days, or as advised. This is to be sure the medicine is helping you get better.  If you are 65 or older, you should get a pneumococcal vaccine and a yearly flu (influenza) shot. You should also get these vaccines if  you have chronic lung disease like asthma, emphysema, or COPD. Recently, a second type of pneumonia vaccine has become available for everyone over 65 years old. This is in addition to the previous vaccine. Ask your provider about this.  When to seek medical advice  Call your healthcare provider right away if any of these occur:  · You dont get better within the first 48 hours of treatment  · Shortness of breath gets worse  · Rapid breathing (more than 25 breaths per minute)  · Coughing up blood  · Chest pain gets worse with breathing  · Fever of 100.4°F (38°C) or higher that doesnt get better with fever medicine  · Weakness, dizziness, or fainting that gets worse  · Thirst or dry mouth that gets worse  · Sinus pain, headache, or a stiff neck  · Chest pain not caused by coughing  Date Last Reviewed: 1/1/2017  © 3194-6109 [employer] [address] [place] [address]. All rights reserved. This information is not intended as a substitute for professional medical care. Always follow your healthcare professional's instructions.    Please return here or go to the Emergency Department for any concerns or worsening of condition.  If you were prescribed antibiotics, please take them to completion.  If you were prescribed a narcotic medication, do not drive or operate heavy equipment or machinery while taking these medications.  Please follow up with your primary care doctor or specialist as needed.    If you  smoke, please stop smoking.

## 2018-10-06 NOTE — PROGRESS NOTES
"Subjective:       Patient ID: Diego Gunter is a 78 y.o. male.    Vitals:  height is 5' 9" (1.753 m) and weight is 75.3 kg (166 lb). His temperature is 97.8 °F (36.6 °C). His blood pressure is 111/76 and his pulse is 61. His respiration is 16 and oxygen saturation is 97%.     Chief Complaint: Cough    Pt is coughing up blood which began at 7:10 am this morning  Pt saw Dr. Solorio pulmonologist on Tuesday.  Had CT scan done Sept. 27      Cough   This is a new problem. The current episode started today. The problem occurs constantly. The cough is productive of bloody sputum. Associated symptoms include shortness of breath. Pertinent negatives include no chest pain, chills, ear pain, eye redness, fever, headaches, myalgias, sore throat or wheezing. Nothing aggravates the symptoms.     Review of Systems   Constitution: Negative for chills, fever and malaise/fatigue.   HENT: Negative for congestion, ear pain, hoarse voice and sore throat.    Eyes: Negative for discharge and redness.   Cardiovascular: Negative for chest pain, dyspnea on exertion and leg swelling.   Respiratory: Positive for cough, shortness of breath and sputum production. Negative for wheezing.    Musculoskeletal: Negative for myalgias.   Gastrointestinal: Negative for abdominal pain and nausea.   Neurological: Positive for dizziness. Negative for headaches.   All other systems reviewed and are negative.      Objective:      Physical Exam   Constitutional: He is oriented to person, place, and time. He appears well-developed and well-nourished. He is cooperative.  Non-toxic appearance. He does not appear ill. No distress.   HENT:   Head: Normocephalic and atraumatic.   Right Ear: Hearing, tympanic membrane, external ear and ear canal normal.   Left Ear: Hearing, tympanic membrane, external ear and ear canal normal.   Nose: Nose normal. No mucosal edema, rhinorrhea or nasal deformity. No epistaxis. Right sinus exhibits no maxillary sinus tenderness and no " frontal sinus tenderness. Left sinus exhibits no maxillary sinus tenderness and no frontal sinus tenderness.   Mouth/Throat: Uvula is midline, oropharynx is clear and moist and mucous membranes are normal. No trismus in the jaw. Normal dentition. No uvula swelling. No posterior oropharyngeal erythema.   Eyes: Conjunctivae and lids are normal. No scleral icterus.   Sclera clear bilat   Neck: Trachea normal, full passive range of motion without pain and phonation normal. Neck supple.   Cardiovascular: Normal rate, regular rhythm, normal heart sounds, intact distal pulses and normal pulses.   Pulmonary/Chest: Effort normal and breath sounds normal. No respiratory distress.   Abdominal: Soft. Normal appearance and bowel sounds are normal. He exhibits no distension. There is no tenderness.   Musculoskeletal: Normal range of motion. He exhibits no edema or deformity.   Neurological: He is alert and oriented to person, place, and time. He exhibits normal muscle tone. Coordination normal.   Skin: Skin is warm, dry and intact. He is not diaphoretic. No pallor.   Psychiatric: He has a normal mood and affect. His speech is normal and behavior is normal. Judgment and thought content normal. Cognition and memory are normal.   Nursing note and vitals reviewed.      Xr Chest Pa And Lateral    Result Date: 10/6/2018  EXAMINATION: XR CHEST PA AND LATERAL CLINICAL HISTORY: Hemoptysis TECHNIQUE: PA and lateral views of the chest were performed. COMPARISON: 08/05/2015 FINDINGS: There is some patchy mixed interstitial/alveolar opacification within the right upper lung zone that is suspicious for pneumonia.  Close radiographic follow-up to resolution is recommended.  The heart is not enlarged.     As above Electronically signed by: Silas Wu DO Date:    10/06/2018 Time:    09:36      Assessment:       1. Blood in sputum    2. Pneumonia due to infectious organism, unspecified laterality, unspecified part of lung        Plan:          Blood in sputum  -     XR CHEST PA AND LATERAL; Future; Expected date: 10/06/2018    Pneumonia due to infectious organism, unspecified laterality, unspecified part of lung    Other orders  -     levoFLOXacin (LEVAQUIN) 750 MG tablet; Take 1 tablet (750 mg total) by mouth once daily. for 5 days  Dispense: 5 tablet; Refill: 0      Lungs are clear and he has no SOB at this time and no other abnormalities noted on PE.  PT saw a pulmonologist this past week for lung mass and SOB.  VS today are normal and he is coughing up occasional scant bloody sputum.  CXR noted possible pneumonia. Will treat X5 days.  Pt is required to go to ER if any bright red blood presents.  He is also to f/u with speciality for resolution.      Pneumonia (Adult)  Pneumonia is an infection deep within the lungs. It is in the small air sacs (alveoli). Pneumonia may be caused by a virus or bacteria. Pneumonia caused by bacteria is usually treated with an antibiotic. Severe cases may need to be treated in the hospital. Milder cases can be treated at home. Symptoms usually start to get better during the first 2 days of treatment.    Home care  Follow these guidelines when caring for yourself at home:  · Rest at home for the first 2 to 3 days, or until you feel stronger. Dont let yourself get overly tired when you go back to your activities.  · Stay away from cigarette smoke - yours or other peoples.  · You may use acetaminophen or ibuprofen to control fever or pain, unless another medicine was prescribed. If you have chronic liver or kidney disease, talk with your healthcare provider before using these medicines. Also talk with your provider if youve had a stomach ulcer or gastrointestinal bleeding. Dont give aspirin to anyone younger than 18 years of age who is ill with a fever. It may cause severe liver damage.  · Your appetite may be poor, so a light diet is fine.  · Drink 6 to 8 glasses of fluids every day to make sure you are getting  enough fluids. Beverages can include water, sport drinks, sodas without caffeine, juices, tea, or soup. Fluids will help loosen secretions in the lung. This will make it easier for you to cough up the phlegm (sputum). If you also have heart or kidney disease, check with your healthcare provider before you drink extra fluids.  · Take antibiotic medicine prescribed until it is all gone, even if you are feeling better after a few days.  Follow-up care  Follow up with your healthcare provider in the next 2 to 3 days, or as advised. This is to be sure the medicine is helping you get better.  If you are 65 or older, you should get a pneumococcal vaccine and a yearly flu (influenza) shot. You should also get these vaccines if you have chronic lung disease like asthma, emphysema, or COPD. Recently, a second type of pneumonia vaccine has become available for everyone over 65 years old. This is in addition to the previous vaccine. Ask your provider about this.  When to seek medical advice  Call your healthcare provider right away if any of these occur:  · You dont get better within the first 48 hours of treatment  · Shortness of breath gets worse  · Rapid breathing (more than 25 breaths per minute)  · Coughing up blood  · Chest pain gets worse with breathing  · Fever of 100.4°F (38°C) or higher that doesnt get better with fever medicine  · Weakness, dizziness, or fainting that gets worse  · Thirst or dry mouth that gets worse  · Sinus pain, headache, or a stiff neck  · Chest pain not caused by coughing  Date Last Reviewed: 1/1/2017  © 8317-4147 [employer]. [address] [place] [address], [place] [address]. All rights reserved. This information is not intended as a substitute for professional medical care. Always follow your healthcare professional's instructions.    Please return here or go to the Emergency Department for any concerns or worsening of condition.  If you were prescribed antibiotics, please take them to  completion.  If you were prescribed a narcotic medication, do not drive or operate heavy equipment or machinery while taking these medications.  Please follow up with your primary care doctor or specialist as needed.    If you  smoke, please stop smoking.

## 2018-10-08 DIAGNOSIS — J98.4 CAVITARY LESION OF LUNG: Primary | ICD-10-CM

## 2018-10-08 NOTE — TELEPHONE ENCOUNTER
Spoken with patient over the phone.  Breathing improved.  In light of negative quantiferon and hiv, will set up bronchoscopy with tbbx.  Please schedule fob for patient.

## 2018-10-12 ENCOUNTER — TELEPHONE (OUTPATIENT)
Dept: PULMONOLOGY | Facility: CLINIC | Age: 78
End: 2018-10-12

## 2018-10-12 NOTE — TELEPHONE ENCOUNTER
Spoke with patient, gave instruction for bronchoscopy, NPO after MN the night before procedure, may take meds with small sips of water, patient stopping fish oil on Thursday 10/11- no blood thinners, arrive at [facility] 2nd floor at 0630 10/15/18.

## 2018-10-15 ENCOUNTER — HOSPITAL ENCOUNTER (INPATIENT)
Facility: HOSPITAL | Age: 78
LOS: 2 days | Discharge: HOME | DRG: 195 | End: 2018-10-17
Attending: EMERGENCY MEDICINE | Admitting: EMERGENCY MEDICINE
Payer: MEDICARE

## 2018-10-15 DIAGNOSIS — J18.9 PNEUMONIA OF BOTH LUNGS DUE TO INFECTIOUS ORGANISM, UNSPECIFIED PART OF LUNG: Primary | ICD-10-CM

## 2018-10-15 DIAGNOSIS — M54.9 UPPER BACK PAIN: ICD-10-CM

## 2018-10-15 DIAGNOSIS — R07.9 CHEST PAIN: ICD-10-CM

## 2018-10-15 PROBLEM — J98.4 CAVITARY LESION OF LUNG: Status: ACTIVE | Noted: 2018-10-15

## 2018-10-15 LAB
ALBUMIN SERPL BCP-MCNC: 3.7 G/DL
ALP SERPL-CCNC: 73 U/L
ALT SERPL W/O P-5'-P-CCNC: 23 U/L
ANION GAP SERPL CALC-SCNC: 8 MMOL/L
AST SERPL-CCNC: 32 U/L
BASOPHILS # BLD AUTO: 0.02 K/UL
BASOPHILS NFR BLD: 0.2 %
BILIRUB SERPL-MCNC: 0.9 MG/DL
BNP SERPL-MCNC: 13 PG/ML
BUN SERPL-MCNC: 7 MG/DL
CALCIUM SERPL-MCNC: 9.2 MG/DL
CHLORIDE SERPL-SCNC: 105 MMOL/L
CO2 SERPL-SCNC: 27 MMOL/L
CREAT SERPL-MCNC: 1 MG/DL
DIFFERENTIAL METHOD: ABNORMAL
EOSINOPHIL # BLD AUTO: 0 K/UL
EOSINOPHIL NFR BLD: 0.2 %
ERYTHROCYTE [DISTWIDTH] IN BLOOD BY AUTOMATED COUNT: 14.2 %
EST. GFR  (AFRICAN AMERICAN): >60 ML/MIN/1.73 M^2
EST. GFR  (NON AFRICAN AMERICAN): >60 ML/MIN/1.73 M^2
GLUCOSE SERPL-MCNC: 161 MG/DL
HCT VFR BLD AUTO: 43.2 %
HGB BLD-MCNC: 14.4 G/DL
LACTATE SERPL-SCNC: 2.6 MMOL/L
LYMPHOCYTES # BLD AUTO: 0.8 K/UL
LYMPHOCYTES NFR BLD: 6.7 %
MCH RBC QN AUTO: 28.9 PG
MCHC RBC AUTO-ENTMCNC: 33.3 G/DL
MCV RBC AUTO: 87 FL
MONOCYTES # BLD AUTO: 0.7 K/UL
MONOCYTES NFR BLD: 5.2 %
NEUTROPHILS # BLD AUTO: 11.1 K/UL
NEUTROPHILS NFR BLD: 87.7 %
PLATELET # BLD AUTO: 230 K/UL
PMV BLD AUTO: 10 FL
POCT GLUCOSE: 164 MG/DL (ref 70–110)
POTASSIUM SERPL-SCNC: 4.1 MMOL/L
PROT SERPL-MCNC: 7.2 G/DL
RBC # BLD AUTO: 4.99 M/UL
SODIUM SERPL-SCNC: 140 MMOL/L
TROPONIN I SERPL DL<=0.01 NG/ML-MCNC: <0.006 NG/ML
TROPONIN I SERPL DL<=0.01 NG/ML-MCNC: <0.006 NG/ML
WBC # BLD AUTO: 12.6 K/UL

## 2018-10-15 PROCEDURE — 85025 COMPLETE CBC W/AUTO DIFF WBC: CPT

## 2018-10-15 PROCEDURE — 93005 ELECTROCARDIOGRAM TRACING: CPT

## 2018-10-15 PROCEDURE — 80053 COMPREHEN METABOLIC PANEL: CPT

## 2018-10-15 PROCEDURE — 99285 EMERGENCY DEPT VISIT HI MDM: CPT | Mod: 25

## 2018-10-15 PROCEDURE — 96366 THER/PROPH/DIAG IV INF ADDON: CPT

## 2018-10-15 PROCEDURE — 12000002 HC ACUTE/MED SURGE SEMI-PRIVATE ROOM

## 2018-10-15 PROCEDURE — 96375 TX/PRO/DX INJ NEW DRUG ADDON: CPT

## 2018-10-15 PROCEDURE — 25000003 PHARM REV CODE 250: Performed by: EMERGENCY MEDICINE

## 2018-10-15 PROCEDURE — 83880 ASSAY OF NATRIURETIC PEPTIDE: CPT

## 2018-10-15 PROCEDURE — 25500020 PHARM REV CODE 255: Performed by: EMERGENCY MEDICINE

## 2018-10-15 PROCEDURE — 83605 ASSAY OF LACTIC ACID: CPT

## 2018-10-15 PROCEDURE — 82962 GLUCOSE BLOOD TEST: CPT

## 2018-10-15 PROCEDURE — 11000001 HC ACUTE MED/SURG PRIVATE ROOM

## 2018-10-15 PROCEDURE — 87040 BLOOD CULTURE FOR BACTERIA: CPT | Mod: 59

## 2018-10-15 PROCEDURE — 84484 ASSAY OF TROPONIN QUANT: CPT

## 2018-10-15 PROCEDURE — 96365 THER/PROPH/DIAG IV INF INIT: CPT

## 2018-10-15 PROCEDURE — 93010 ELECTROCARDIOGRAM REPORT: CPT | Mod: ,,, | Performed by: INTERNAL MEDICINE

## 2018-10-15 PROCEDURE — 63600175 PHARM REV CODE 636 W HCPCS: Performed by: EMERGENCY MEDICINE

## 2018-10-15 RX ORDER — PANTOPRAZOLE SODIUM 40 MG/1
40 TABLET, DELAYED RELEASE ORAL DAILY
Status: DISCONTINUED | OUTPATIENT
Start: 2018-10-16 | End: 2018-10-17 | Stop reason: HOSPADM

## 2018-10-15 RX ORDER — AMOXICILLIN 250 MG
1 CAPSULE ORAL 2 TIMES DAILY
Status: DISCONTINUED | OUTPATIENT
Start: 2018-10-15 | End: 2018-10-17 | Stop reason: HOSPADM

## 2018-10-15 RX ORDER — RAMELTEON 8 MG/1
8 TABLET ORAL NIGHTLY PRN
Status: DISCONTINUED | OUTPATIENT
Start: 2018-10-15 | End: 2018-10-17 | Stop reason: HOSPADM

## 2018-10-15 RX ORDER — OXYCODONE HYDROCHLORIDE 5 MG/1
5 TABLET ORAL EVERY 4 HOURS PRN
Status: DISCONTINUED | OUTPATIENT
Start: 2018-10-15 | End: 2018-10-17 | Stop reason: HOSPADM

## 2018-10-15 RX ORDER — ACETAMINOPHEN 325 MG/1
650 TABLET ORAL EVERY 8 HOURS PRN
Status: DISCONTINUED | OUTPATIENT
Start: 2018-10-15 | End: 2018-10-17 | Stop reason: HOSPADM

## 2018-10-15 RX ORDER — ONDANSETRON 2 MG/ML
4 INJECTION INTRAMUSCULAR; INTRAVENOUS EVERY 8 HOURS PRN
Status: DISCONTINUED | OUTPATIENT
Start: 2018-10-15 | End: 2018-10-17 | Stop reason: HOSPADM

## 2018-10-15 RX ORDER — PRAVASTATIN SODIUM 10 MG/1
10 TABLET ORAL DAILY
Status: DISCONTINUED | OUTPATIENT
Start: 2018-10-16 | End: 2018-10-17 | Stop reason: HOSPADM

## 2018-10-15 RX ORDER — ACETAMINOPHEN 325 MG/1
TABLET ORAL
Status: DISPENSED
Start: 2018-10-15 | End: 2018-10-16

## 2018-10-15 RX ORDER — MORPHINE SULFATE 4 MG/ML
4 INJECTION, SOLUTION INTRAMUSCULAR; INTRAVENOUS EVERY 4 HOURS PRN
Status: DISCONTINUED | OUTPATIENT
Start: 2018-10-15 | End: 2018-10-17 | Stop reason: HOSPADM

## 2018-10-15 RX ADMIN — SODIUM CHLORIDE 1000 ML: 0.9 INJECTION, SOLUTION INTRAVENOUS at 10:10

## 2018-10-15 RX ADMIN — IOHEXOL 100 ML: 350 INJECTION, SOLUTION INTRAVENOUS at 07:10

## 2018-10-15 RX ADMIN — DOXYCYCLINE 100 MG: 100 INJECTION, POWDER, LYOPHILIZED, FOR SOLUTION INTRAVENOUS at 09:10

## 2018-10-15 RX ADMIN — ACETAMINOPHEN 650 MG: 325 TABLET ORAL at 10:10

## 2018-10-15 RX ADMIN — CEFTRIAXONE 1 G: 1 INJECTION, SOLUTION INTRAVENOUS at 08:10

## 2018-10-15 NOTE — ED TRIAGE NOTES
Pt arrived to ED via personal transport. Pt had bronchoscopy performed this AM. Pt states he felt normal upon discharge then around 1500 today pt began having SOB, pain between the shoulder blades as well as fever and chills. Pt is AAO x 4 and in no apparent distress.

## 2018-10-15 NOTE — ED PROVIDER NOTES
"Encounter Date: 10/15/2018    SORT:  78 y.o. male presenting to ED for upper back pain. Limited triage exam:  Non-toxic. If orders were placed, they are pending.     Moises Obando PA-C  10/16/2018  4:49 PM   SCRIBE #1 NOTE: I, Shilpa Morse, am scribing for, and in the presence of,  Adryan Benavides MD. I have scribed the following portions of the note - Other sections scribed: HPI, ROS, PE.       History     Chief Complaint   Patient presents with    Shortness of Breath     Pt had scope of lungs today at [facility]. Pt d/c home and has had increasing SOB and back pain between shoulder blades. Symptoms started after procedure today. Sent in by Pulmo. for evaluation.    Back Pain     CC: Shortness of Breath    HPI: This 78 y. Male who has Diabetes mellitus presents to the ED c/ acute, intermittent shortness of breath for the past 2-3 weeks.  Patient also reports of a productive cough.  Patient reports his sputum being dark in the morning and becomes clear as the day progresses.  Patient reports 2 weeks ago, he had episodes of blood to his sputum, which prompted a visit with pulmonology.  Patient reports having a bronchoscopy performed this morning and reports being informed there was a "void" to his upper left lung.  Patient reports upon arrival home after his procedure, he began feeling fatigued.  At approximately 3 pm today, the patient began having upper back pain radiating to his posterior neck and occipital head described as a dull sensation.  He reports the pain is worse with deep breathing and reports his pain has been intermittent since onset.  Patient also reports of a low grade fever of 100.  Patient denies chest pain, leg swelling, nausea, emesis, diarrhea, abdominal pain, or any other associated symptoms.  No prior tx.  No alleviating factors.  No h/o DVT/PE or MI.         The history is provided by the patient. No  was used.     Review of patient's allergies indicates:  No Known " Allergies  Past Medical History:   Diagnosis Date    Dysphagia     Hx of colonic polyps     followed by GI. Dr. Pham    Hyperlipidemia LDL goal <100     Overweight(278.02)     Prostate cancer 2011    followed by urology, Dr. Rogers    Type II or unspecified type diabetes mellitus without mention of complication, not stated as uncontrolled     diet controlled     Past Surgical History:   Procedure Laterality Date    CATARACT EXTRACTION, BILATERAL      ESOPHAGOGASTRODUODENOSCOPY (EGD) N/A 2016    [facility]    PROSTATECTOMY       Family History   Problem Relation Age of Onset    Colon cancer Mother     Diabetes Mother     Heart disease Father     Mental illness Sister     Diabetes Sister     Other Brother         polio as a child    Diabetes Unknown     Diabetes Brother     Myasthenia gravis Brother     Parkinsonism Brother     Diabetes Brother     Dementia Brother     Lung cancer Brother         smoker    Diabetes Maternal Aunt      Social History     Tobacco Use    Smoking status: Former Smoker     Packs/day: 0.25     Years: 5.00     Pack years: 1.25     Last attempt to quit: 10/2/1962     Years since quittin.0    Smokeless tobacco: Never Used    Tobacco comment: quit age 23   Substance Use Topics    Alcohol use: Yes     Comment: beer occasionally    Drug use: No     Review of Systems   Constitutional: Positive for chills. Negative for fever.   HENT: Negative for ear pain and sore throat.    Eyes: Negative for pain.   Respiratory: Positive for cough and shortness of breath.    Cardiovascular: Negative for chest pain and leg swelling.   Gastrointestinal: Negative for abdominal pain, diarrhea, nausea and vomiting.   Genitourinary: Negative for dysuria.   Musculoskeletal: Positive for back pain and neck pain.   Skin: Negative for rash.   Neurological: Positive for headaches.   Psychiatric/Behavioral: Positive for confusion.        Physical Exam     Initial Vitals [10/15/18 1649]   BP Pulse Resp Temp SpO2   (!) 124/97 99 20 98.3 °F (36.8 °C) 97 %      MAP       --         Physical Exam    Nursing note and vitals reviewed.  Constitutional: Vital signs are normal. He appears well-developed and well-nourished. He is not diaphoretic. He is active.  Non-toxic appearance. No distress.   HENT:   Head: Normocephalic and atraumatic.   Right Ear: External ear normal.   Left Ear: External ear normal.   Nose: Nose normal.   Mouth/Throat: Oropharynx is clear and moist. No oropharyngeal exudate.   Eyes: Conjunctivae and EOM are normal. Pupils are equal, round, and reactive to light. No scleral icterus.   Neck: Trachea normal and normal range of motion. Neck supple.   Cardiovascular: Normal rate, regular rhythm, normal heart sounds and intact distal pulses. Exam reveals no gallop and no friction rub.    No murmur heard.  Pulmonary/Chest: Breath sounds normal. No stridor. No respiratory distress. He has no wheezes. He has no rhonchi. He has no rales.   Patient has course breath sounds to the left lower lung field.     Abdominal: Soft. Normal appearance and bowel sounds are normal. He exhibits no distension. There is no tenderness. There is no rebound and no guarding.   Musculoskeletal: Normal range of motion. He exhibits no edema or tenderness.   Neurological: He is alert and oriented to person, place, and time. He has normal strength. No cranial nerve deficit.   Skin: Skin is warm, dry and intact. No rash noted.   Psychiatric: He has a normal mood and affect. His behavior is normal.         ED Course   Procedures  Labs Reviewed   CBC W/ AUTO DIFFERENTIAL - Abnormal; Notable for the following components:       Result Value    Gran # (ANC) 11.1 (*)     Lymph # 0.8 (*)     Gran% 87.7 (*)     Lymph% 6.7 (*)     All other components within normal limits   COMPREHENSIVE METABOLIC PANEL - Abnormal; Notable for the following components:    Glucose 161 (*)      BUN, Bld 7 (*)     All other components within normal limits   LACTIC ACID, PLASMA - Abnormal; Notable for the following components:    Lactate (Lactic Acid) 2.6 (*)     All other components within normal limits   POCT GLUCOSE - Abnormal; Notable for the following components:    POCT Glucose 164 (*)     All other components within normal limits   TROPONIN I   TROPONIN I   B-TYPE NATRIURETIC PEPTIDE   POCT GLUCOSE MONITORING CONTINUOUS          Imaging Results          CTA Chest Abdomen Pelvis (Final result)  Result time 10/15/18 19:43:12   Procedure changed from CTA Chest Abdomen Non Coronary     Final result by Kane Miguel MD (10/15/18 19:43:12)                 Impression:      1. No evidence of aortic aneurysm or dissection.  2. Known right upper lobe neoplastic versus infectious cavitary lesion.  3. Moderate pulmonary emphysema with mild diffuse ground-glass attenuation which could reflect superimposed edema versus other diffuse infectious or inflammatory process.  4. 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  5. Additional findings as detailed above.      Electronically signed by: Kane Miguel MD  Date:    10/15/2018  Time:    19:43             Narrative:    EXAMINATION:  CTA CHEST ABDOMEN PELVIS    CLINICAL HISTORY:  pain, SOB, fever after bronchoscopy. CP with radiation between shoulder blades and to neck;    TECHNIQUE:  CTA chest abdomen and pelvis was performed following administration of 100 cc Omnipaque 350 IV contrast.    COMPARISON:  CT chest from 09/27/2018.    FINDINGS:  No evidence of aortic aneurysm or dissection.  Heart is normal in size without pericardial effusion.  No abnormalities are seen along the esophageal course.  Moderate emphysematous changes are seen in the lungs with superimposed ground-glass attenuation which can be seen in setting of edema or diffuse infectious or inflammatory process.  There is redemonstration of known cavitary right upper  lobe lesion.  Airways are patent.  Liver, gallbladder, spleen, pancreas, and adrenal glands unremarkable.  Stomach is mildly distended.  No evidence of bowel obstruction.  There is descending/sigmoid diverticulosis.  Appendix is normal.  Urinary bladder is distended.  Kidneys show no evidence of hydronephrosis.  There is 2 cm partial fat density lesion visualized within the right kidney most suggestive for angiomyolipoma.  Degenerative changes are visualized within the spine.                               X-Ray Chest AP Portable (Final result)  Result time 10/15/18 17:44:55    Final result by Kane Miguel MD (10/15/18 17:44:55)                 Impression:      As above.      Electronically signed by: Kane Miguel MD  Date:    10/15/2018  Time:    17:44             Narrative:    EXAMINATION:  XR CHEST AP PORTABLE    CLINICAL HISTORY:  Dorsalgia, unspecified    TECHNIQUE:  Single frontal view of the chest was performed.    COMPARISON:  10/06/2018.  CT chest from 09/27/2018.    FINDINGS:  Heart is normal in size.  Lungs are symmetrically expanded.  Emphysematous changes are seen.  Redemonstration of focal airspace opacity seen within the right upper lung zone.  Lucent cavitary component is not as evident compared to recent radiograph and previous CT.  No evidence of new focal consolidation, pneumothorax, or significant effusion.  No acute osseous abnormality identified.                                 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  Patient states the procedure went well this morning without complaints. Noted sometime around noon to 3 o'clock he started having some pain, difficulty breathing, and fevers.  Pain is intermittent and not  severe.  His grandson also notes the patient has seemed more confused than usual since the onset of his fevers and chills. Temperature recorded at home was over 100 per patient.  On exam, patient is well-appearing in no acute distress.  O2 sat is 94% with a normal respiratory rate.  Patient is speaking in full and complete sentences.  Temperature measured by me is 100.0 oral.  Differential includes pneumonia, bacteremia, less likely than not impossible complication due to bronchoscopy such as pneumothorax, less likely dissection.  We will get a chest x-ray, labs, recheck his temperature.  I suspect the patient may have pneumonia with some potential bacteremia given the recent instrumentation.  We will obtain blood cultures and monitor closely.  ED Management:  Rectal temperature 101.3.  CTA of the chest abdomen pelvis reveals patchy ground glass opacities.  Given fever, tachycardia, hypoxia, I suspect pneumonia.  Patient started on ceftriaxone and doxycycline with consultation by hospitalist.  Initial lactic acid 2.6.  The patient given fluid bolus.  Blood culture sent.  Will admit for likely sepsis and pneumonia.             Scribe Attestation:   Scribe #1: I performed the above scribed service and the documentation accurately describes the services I performed. I attest to the accuracy of the note.    Attending Attestation:           Physician Attestation for Scribe:  Physician Attestation Statement for Scribe #1: I, Adryan Benavides MD, reviewed documentation, as scribed by Shilpa Morse in my presence, and it is both accurate and complete.                    Clinical Impression:   The primary encounter diagnosis was Pneumonia of both lungs due to infectious organism, unspecified part of lung. Diagnoses of Upper back pain and Chest pain were also pertinent to this visit.      Disposition:   Disposition: Admitted  Condition: Stable                        Adryan Benavides MD  10/16/18 [id number]

## 2018-10-16 PROBLEM — R93.89 ABNORMAL CT OF THE CHEST: Status: ACTIVE | Noted: 2018-10-16

## 2018-10-16 LAB — TROPONIN I SERPL DL<=0.01 NG/ML-MCNC: <0.006 NG/ML

## 2018-10-16 PROCEDURE — 11000001 HC ACUTE MED/SURG PRIVATE ROOM

## 2018-10-16 PROCEDURE — 94761 N-INVAS EAR/PLS OXIMETRY MLT: CPT

## 2018-10-16 PROCEDURE — 84484 ASSAY OF TROPONIN QUANT: CPT

## 2018-10-16 PROCEDURE — 25000003 PHARM REV CODE 250: Performed by: EMERGENCY MEDICINE

## 2018-10-16 PROCEDURE — 63600175 PHARM REV CODE 636 W HCPCS: Performed by: EMERGENCY MEDICINE

## 2018-10-16 PROCEDURE — 99223 1ST HOSP IP/OBS HIGH 75: CPT | Mod: ,,, | Performed by: EMERGENCY MEDICINE

## 2018-10-16 PROCEDURE — 36415 COLL VENOUS BLD VENIPUNCTURE: CPT

## 2018-10-16 RX ADMIN — DOXYCYCLINE 100 MG: 100 INJECTION, POWDER, LYOPHILIZED, FOR SOLUTION INTRAVENOUS at 09:10

## 2018-10-16 RX ADMIN — PANTOPRAZOLE SODIUM 40 MG: 40 TABLET, DELAYED RELEASE ORAL at 10:10

## 2018-10-16 RX ADMIN — PRAVASTATIN SODIUM 10 MG: 10 TABLET ORAL at 10:10

## 2018-10-16 RX ADMIN — DOCUSATE SODIUM AND SENNOSIDES 1 TABLET: 8.6; 5 TABLET, FILM COATED ORAL at 10:10

## 2018-10-16 RX ADMIN — DOXYCYCLINE 100 MG: 100 INJECTION, POWDER, LYOPHILIZED, FOR SOLUTION INTRAVENOUS at 10:10

## 2018-10-16 RX ADMIN — DOCUSATE SODIUM AND SENNOSIDES 1 TABLET: 8.6; 5 TABLET, FILM COATED ORAL at 08:10

## 2018-10-16 RX ADMIN — CEFTRIAXONE 1 G: 1 INJECTION, SOLUTION INTRAVENOUS at 08:10

## 2018-10-16 RX ADMIN — DOCUSATE SODIUM AND SENNOSIDES 1 TABLET: 8.6; 5 TABLET, FILM COATED ORAL at 12:10

## 2018-10-16 NOTE — CARE UPDATE
Patient seen and examined.  Agree with Dr. Hernandes's treatment and plan.  Appreciate Pulmonary input.  Possible post bronch pneumonitis.  Continue current ABx's.  Looking much better.  Possibly home in [place] if stable.

## 2018-10-16 NOTE — SUBJECTIVE & OBJECTIVE
Past Medical History:   Diagnosis Date    Dysphagia     Hx of colonic polyps     followed by GI. Dr. Pham    Hyperlipidemia LDL goal <100     Overweight(278.02)     Prostate cancer 2011    followed by urology, Dr. Rogers    Type II or unspecified type diabetes mellitus without mention of complication, not stated as uncontrolled     diet controlled       Past Surgical History:   Procedure Laterality Date    BRONCHOSCOPY N/A 10/15/2018    Procedure: BRONCHOSCOPY;  Surgeon: [facility] Diagnostic Provider;  Location: [facility] OR [address] [place] [address];  Service: Anesthesiology;  Laterality: N/A;    BRONCHOSCOPY N/A 10/15/2018    Performed by [facility] Diagnostic Provider at [facility] OR [address] [place] [address]    CATARACT EXTRACTION, BILATERAL      ESOPHAGOGASTRODUODENOSCOPY (EGD) N/A 2016    Performed by [facility]tero MD at [facility] ENDO    PROSTATECTOMY         Review of patient's allergies indicates:  No Known Allergies    Family History     Problem Relation (Age of Onset)    Colon cancer Mother    Dementia Brother    Diabetes Mother, Sister, , Brother, Brother, Maternal Aunt    Heart disease Father    Lung cancer Brother    Mental illness Sister    Myasthenia gravis Brother    Other Brother    Parkinsonism Brother        Tobacco Use    Smoking status: Former Smoker     Packs/day: 0.25     Years: 5.00     Pack years: 1.25     Last attempt to quit: 10/2/1962     Years since quittin.0    Smokeless tobacco: Never Used    Tobacco comment: quit age 23   Substance and Sexual Activity    Alcohol use: Yes     Comment: beer occasionally    Drug use: No    Sexual activity: Yes     Partners: Female         Review of Systems   Constitutional: Positive for appetite change, chills and fever. Negative for activity change, diaphoresis, fatigue and unexpected weight change.   HENT: Negative for congestion, ear discharge, ear pain, hearing loss, rhinorrhea, sore throat, trouble swallowing and voice change.    Eyes: Negative for pain,  discharge, redness and visual disturbance.   Respiratory: Positive for chest tightness (resolved ) and shortness of breath (resolved). Negative for wheezing and stridor.    Cardiovascular: Negative for chest pain, palpitations and leg swelling.   Gastrointestinal: Negative for abdominal distention, abdominal pain, blood in stool, nausea and vomiting.   Endocrine: Negative for polydipsia and polyuria.   Genitourinary: Negative for decreased urine volume, difficulty urinating, flank pain and hematuria.   Musculoskeletal: Positive for back pain (resolved ). Negative for arthralgias, joint swelling, myalgias, neck pain and neck stiffness.   Skin: Negative for pallor, rash and wound.   Neurological: Negative for dizziness, syncope, weakness and headaches.   All other systems reviewed and are negative.    Objective:     Vital Signs (Most Recent):  Temp: 97 °F (36.1 °C) (10/16/18 1118)  Pulse: (!) 49 (10/16/18 1118)  Resp: 18 (10/16/18 1118)  BP: 131/67 (10/16/18 1118)  SpO2: 96 % (10/16/18 1118) Vital Signs (24h Range):  Temp:  [97 °F (36.1 °C)-101.3 °F (38.5 °C)] 97 °F (36.1 °C)  Pulse:  [49-99] 49  Resp:  [18-20] 18  SpO2:  [91 %-97 %] 96 %  BP: ()/(52-97) 131/67     Weight: 74.6 kg (164 lb 6.4 oz)  Body mass index is 24.28 kg/m².    No intake or output data in the 24 hours ending 10/16/18 1127    Physical Exam   Constitutional: He is oriented to person, place, and time. He appears well-developed and well-nourished. No distress.   HENT:   Head: Normocephalic and atraumatic.   Right Ear: External ear normal.   Left Ear: External ear normal.   Nose: Nose normal.   Mouth/Throat: Oropharynx is clear and moist. No oropharyngeal exudate.   Eyes: Conjunctivae and EOM are normal. Pupils are equal, round, and reactive to light. Right eye exhibits no discharge. Left eye exhibits no discharge. No scleral icterus.   Neck: Normal range of motion. Neck supple. No JVD present. No tracheal deviation present. No thyromegaly  present.   Cardiovascular: Normal rate, regular rhythm and normal heart sounds. Exam reveals no gallop and no friction rub.   No murmur heard.  Pulmonary/Chest: Effort normal and breath sounds normal. No stridor. No respiratory distress. He has no wheezes. He has no rales. He exhibits no tenderness.   Abdominal: Soft. Bowel sounds are normal. He exhibits no distension and no mass. There is no tenderness. There is no rebound and no guarding.   Musculoskeletal: Normal range of motion. He exhibits no edema, tenderness or deformity.   Lymphadenopathy:     He has no cervical adenopathy.   Neurological: He is alert and oriented to person, place, and time.   Non focal    Skin: He is not diaphoretic.       Vents:       Lines/Drains/Airways     Peripheral Intravenous Line                 Peripheral IV - Single Lumen 10/15/18 1736 Right Antecubital less than 1 day                Significant Labs:    CBC/Anemia Profile:  Recent Labs   Lab  10/15/18   1525   WBC  12.60   HGB  14.4   HCT  43.2   PLT  230   MCV  87   RDW  14.2        Chemistries:  Recent Labs   Lab  10/15/18   1525   NA  140   K  4.1   CL  105   CO2  27   BUN  7*   CREATININE  1.0   CALCIUM  9.2   ALBUMIN  3.7   PROT  7.2   BILITOT  0.9   ALKPHOS  73   ALT  23   AST  32     HIV- negative   TB IGRA negative     LA- 2.6   BNP-13       MICRO-     Blood Cultures- NGTD     Gram Stain (Respiratory) <10 epithelial cells per low power field.    Gram Stain (Respiratory) Rare WBC's    Gram Stain (Respiratory) Rare Gram positive cocci    Resulting Agency [facility]      Narrative   Performed by: [facility]   Bronchial Wash     AFB- bronch- pending     Fungal culture BAL- pending      AFB Culture & Smear Culture positive, identification pending P   AFB Culture & Smear Results called to and read back by: Twyla Larry  10/10/2018  15:30  P   AFB Culture & Smear for Dr Gurrola P   AFB CULTURE STAIN No acid fast bacilli seen.        Cytology- pending     Significant Imaging:   CT: I have  reviewed all pertinent results/findings within the past 24 hours and my personal findings are:  1. No evidence of aortic aneurysm or dissection.     1. No evidence of aortic aneurysm or dissection.  2. Known right upper lobe neoplastic versus infectious cavitary lesion.  3. Moderate pulmonary emphysema with mild diffuse ground-glass attenuation which could reflect superimposed edema versus other diffuse infectious or inflammatory process.  4. 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  5. Additional findings as detailed above    CT Chest 9/2018- RUL cavitary lesion       PFTs:     Pre FEV1 2.04 - 3.76 L 3.41    Pre FEV1 FVC 61.07 - 89.60 % 82.28    Pre FVC 2.83 - 4.92 L 4.15    Pre PEF 5.23 - 9.71 L/s 6.91    Pre FEF 25 75 0.85 - 3.39 L/s 2.68    Pre  sec 3.50    Pre DLCO 18.14 - 32.00 ml/(min*mmHg) 39.22 Abnormally high

## 2018-10-16 NOTE — PLAN OF CARE
10/16/18 1255   Discharge Assessment   Assessment Type Discharge Planning Assessment   Confirmed/corrected address and phone number on facesheet? Yes   Assessment information obtained from? Patient   Communicated expected length of stay with patient/caregiver yes   Prior to hospitilization cognitive status: Alert/Oriented   Prior to hospitalization functional status: Independent   Current cognitive status: Alert/Oriented   Current Functional Status: Independent   Lives With spouse   Able to Return to Prior Arrangements yes   Is patient able to care for self after discharge? Yes   Who are your caregiver(s) and their phone number(s)? Addis, pt spouse, [phone]   Patient's perception of discharge disposition admitted as an inpatient   Readmission Within The Last 30 Days no previous admission in last 30 days   Patient currently being followed by outpatient case management? No   Patient currently receives any other outside agency services? No   Equipment Currently Used at Home none   Do you have any problems affording any of your prescribed medications? No   Is the patient taking medications as prescribed? yes   Does the patient have transportation home? Yes   Transportation Available car;family or friend will provide   Dialysis Name and Scheduled days N/A   Does the patient receive services at the [facility]mad[facility]? No   Discharge Plan A Home with family   Discharge Plan B Home with family   Patient/Family In Agreement With Plan yes     SW to patient's room to discuss Helping the patient manage care at home.   TN/SW role explained to pt.  Patient identified using two identifiers:  Name and date of birth.    SW's name and contact info placed on white board.     Person who will help at home if needed:  Pt spouse, Addis.    Preferred pharmacy:   [facility] [address] [place] [address] ([place], [place] - [address]  [address] [facility]  [place] ([place] [address]  Phone: [phone] Fax: [phone]    [facility] [place] - [address]  [address]  [place] [address]  Phone: [phone] Fax: [phone]      "Help at home Questions" discussed and placed in "My Health Packet" and placed at bedside.     Preferred Appointment time: Morning appointments.         "

## 2018-10-16 NOTE — ASSESSMENT & PLAN NOTE
79 y/o male with recently identified RUL 4.7cm cavitary lesion of lung. Ongoing sputum production, chronic cough and weight loss over the last year. Underwent diagnostic bronchoscopy one day prior and results still pending. Post bronchoscopy developed fever and rigors with marginal BP and elevated LA on ED presentation. Suspect that his SIRS response related to inflammatory reaction post bronchoscopy..     -- Continue with abx for now pending cultures.. Likely more inflammatory.. GGO seen on CT likely BAL fluid..   -- Check procalcitonin   -- await bronchoscopy data  -- has had positive AFB on expectorated sputum x 1.. Awaiting identification.. Calling lab to ensure TB PCR sent. AFB from bronchoscopy pending.  Holding on treatment at this point as may be contaminate   -- Given positive AFB would place in respiratory isolation   -- If BAL studies from bronchoscopy unrevealing will likely need IR transthoracic biopsy

## 2018-10-16 NOTE — CONSULTS
[facility]  Pulmonology  Consult Note    Patient Name: Diego Gunter  MRN: [id number]  Admission Date: 10/15/2018  Hospital Length of Stay: 1 days  Code Status: Full Code  Attending Physician: Julien Paul MD  Primary Care Provider: Portia Ferreira MD   Principal Problem: Pneumonia of both lungs due to infectious organism    Inpatient consult to Pulmonology  Consult performed by: Freddy Cameron MD  Consult ordered by: Adryna Benavides MD        Subjective:     HPI:  Diego Gunter is a 78 y.o. male  has a past medical history of Dysphagia, colonic polyps, Hyperlipidemia LDL goal <100, Overweight(278.02), Prostate cancer (september 2011), and Type II or unspecified type diabetes mellitus without mention of complication, not stated as uncontrolled.  Patient was diagnosed with prostate in 2011 s/p prostatectomy.  Patient with remote smoking history and quit in 1962.  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     Patient with persistent productive cough x 1 year.  +dark brown phlegm.  No hemoptysis.  Patient underwent CT of chest by pcp which revealed rul cavitary lesion.  Patient is here for pulmonary evaluation.  +weight loss 30 lbs since 2016. Underwent FOB yesterday morning at main campus with BAL RUL.. No issues immediately post procedure.. Around noon went to eat lunch and began with ongoing rigors/chills. Did not improve with conservative measures at home and presented to  ED. Found to be febrile to 101, hypoxic.. Admited to hospital medicine service and treated with abx, supplemental O2.. Now on RA. No additional febrile episodes. Hemodynamically stable. Breathing much improved.         Past Medical History:   Diagnosis Date    Dysphagia     Hx of colonic polyps     followed by GI. Dr. Pham    Hyperlipidemia LDL goal <100     Overweight(278.02)     Prostate cancer september 2011    followed by urology, Dr. Rogers    Type II or  unspecified type diabetes mellitus without mention of complication, not stated as uncontrolled     diet controlled       Past Surgical History:   Procedure Laterality Date    BRONCHOSCOPY N/A 10/15/2018    Procedure: BRONCHOSCOPY;  Surgeon: [facility] Diagnostic Provider;  Location: [facility] OR [address] [place] [address];  Service: Anesthesiology;  Laterality: N/A;    BRONCHOSCOPY N/A 10/15/2018    Performed by [facility] Diagnostic Provider at [facility] OR [address] [place] [address]    CATARACT EXTRACTION, BILATERAL  2014    ESOPHAGOGASTRODUODENOSCOPY (EGD) N/A 2016    Performed by Thomas [facility]o MD at [facility] ENDO    PROSTATECTOMY         Review of patient's allergies indicates:  No Known Allergies    Family History     Problem Relation (Age of Onset)    Colon cancer Mother    Dementia Brother    Diabetes Mother, Sister, , Brother, Brother, Maternal Aunt    Heart disease Father    Lung cancer Brother    Mental illness Sister    Myasthenia gravis Brother    Other Brother    Parkinsonism Brother        Tobacco Use    Smoking status: Former Smoker     Packs/day: 0.25     Years: 5.00     Pack years: 1.25     Last attempt to quit: 10/2/1962     Years since quittin.0    Smokeless tobacco: Never Used    Tobacco comment: quit age 23   Substance and Sexual Activity    Alcohol use: Yes     Comment: beer occasionally    Drug use: No    Sexual activity: Yes     Partners: Female         Review of Systems   Constitutional: Positive for appetite change, chills and fever. Negative for activity change, diaphoresis, fatigue and unexpected weight change.   HENT: Negative for congestion, ear discharge, ear pain, hearing loss, rhinorrhea, sore throat, trouble swallowing and voice change.    Eyes: Negative for pain, discharge, redness and visual disturbance.   Respiratory: Positive for chest tightness (resolved ) and shortness of breath (resolved). Negative for wheezing and stridor.    Cardiovascular: Negative for chest pain, palpitations and leg swelling.    Gastrointestinal: Negative for abdominal distention, abdominal pain, blood in stool, nausea and vomiting.   Endocrine: Negative for polydipsia and polyuria.   Genitourinary: Negative for decreased urine volume, difficulty urinating, flank pain and hematuria.   Musculoskeletal: Positive for back pain (resolved ). Negative for arthralgias, joint swelling, myalgias, neck pain and neck stiffness.   Skin: Negative for pallor, rash and wound.   Neurological: Negative for dizziness, syncope, weakness and headaches.   All other systems reviewed and are negative.    Objective:     Vital Signs (Most Recent):  Temp: 97 °F (36.1 °C) (10/16/18 1118)  Pulse: (!) 49 (10/16/18 1118)  Resp: 18 (10/16/18 1118)  BP: 131/67 (10/16/18 1118)  SpO2: 96 % (10/16/18 1118) Vital Signs (24h Range):  Temp:  [97 °F (36.1 °C)-101.3 °F (38.5 °C)] 97 °F (36.1 °C)  Pulse:  [49-99] 49  Resp:  [18-20] 18  SpO2:  [91 %-97 %] 96 %  BP: ()/(52-97) 131/67     Weight: 74.6 kg (164 lb 6.4 oz)  Body mass index is 24.28 kg/m².    No intake or output data in the 24 hours ending 10/16/18 1127    Physical Exam   Constitutional: He is oriented to person, place, and time. He appears well-developed and well-nourished. No distress.   HENT:   Head: Normocephalic and atraumatic.   Right Ear: External ear normal.   Left Ear: External ear normal.   Nose: Nose normal.   Mouth/Throat: Oropharynx is clear and moist. No oropharyngeal exudate.   Eyes: Conjunctivae and EOM are normal. Pupils are equal, round, and reactive to light. Right eye exhibits no discharge. Left eye exhibits no discharge. No scleral icterus.   Neck: Normal range of motion. Neck supple. No JVD present. No tracheal deviation present. No thyromegaly present.   Cardiovascular: Normal rate, regular rhythm and normal heart sounds. Exam reveals no gallop and no friction rub.   No murmur heard.  Pulmonary/Chest: Effort normal and breath sounds normal. No stridor. No respiratory distress. He has no  wheezes. He has no rales. He exhibits no tenderness.   Abdominal: Soft. Bowel sounds are normal. He exhibits no distension and no mass. There is no tenderness. There is no rebound and no guarding.   Musculoskeletal: Normal range of motion. He exhibits no edema, tenderness or deformity.   Lymphadenopathy:     He has no cervical adenopathy.   Neurological: He is alert and oriented to person, place, and time.   Non focal    Skin: He is not diaphoretic.       Vents:       Lines/Drains/Airways     Peripheral Intravenous Line                 Peripheral IV - Single Lumen 10/15/18 1736 Right Antecubital less than 1 day                Significant Labs:    CBC/Anemia Profile:  Recent Labs   Lab  10/15/18   1525   WBC  12.60   HGB  14.4   HCT  43.2   PLT  230   MCV  87   RDW  14.2        Chemistries:  Recent Labs   Lab  10/15/18   1525   NA  140   K  4.1   CL  105   CO2  27   BUN  7*   CREATININE  1.0   CALCIUM  9.2   ALBUMIN  3.7   PROT  7.2   BILITOT  0.9   ALKPHOS  73   ALT  23   AST  32     HIV- negative   TB IGRA negative     LA- 2.6   BNP-13       MICRO-     Blood Cultures- NGTD     Gram Stain (Respiratory) <10 epithelial cells per low power field.    Gram Stain (Respiratory) Rare WBC's    Gram Stain (Respiratory) Rare Gram positive cocci    Resulting Agency [facility]      Narrative   Performed by: [facility]   Bronchial Wash     AFB- bronch- pending     Fungal culture BAL- pending      AFB Culture & Smear Culture positive, identification pending P   AFB Culture & Smear Results called to and read back by: Twyla Larry  10/10/2018  15:30  P   AFB Culture & Smear for Dr Gurrola P   AFB CULTURE STAIN No acid fast bacilli seen.        Cytology- pending     Significant Imaging:   CT: I have reviewed all pertinent results/findings within the past 24 hours and my personal findings are:  1. No evidence of aortic aneurysm or dissection.     1. No evidence of aortic aneurysm or dissection.  2. Known right upper lobe neoplastic versus  infectious cavitary lesion.  3. Moderate pulmonary emphysema with mild diffuse ground-glass attenuation which could reflect superimposed edema versus other diffuse infectious or inflammatory process.  4. 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  5. Additional findings as detailed above    CT Chest 9/2018- RUL cavitary lesion       PFTs:     Pre FEV1 2.04 - 3.76 L 3.41    Pre FEV1 FVC 61.07 - 89.60 % 82.28    Pre FVC 2.83 - 4.92 L 4.15    Pre PEF 5.23 - 9.71 L/s 6.91    Pre FEF 25 75 0.85 - 3.39 L/s 2.68    Pre  sec 3.50    Pre DLCO 18.14 - 32.00 ml/(min*mmHg) 39.22 Abnormally high           Assessment/Plan:     Cavitary lesion of lung    79 y/o male with recently identified RUL 4.7cm cavitary lesion of lung. Ongoing sputum production, chronic cough and weight loss over the last year. Underwent diagnostic bronchoscopy one day prior and results still pending. Post bronchoscopy developed fever and rigors with marginal BP and elevated LA on ED presentation. Suspect that his SIRS response related to inflammatory reaction post bronchoscopy..     -- Continue with abx for now pending cultures.. Likely more inflammatory.. GGO seen on CT likely BAL fluid..   -- Check procalcitonin   -- await bronchoscopy data  -- has had positive AFB on expectorated sputum x 1.. Awaiting identification.. Calling lab to ensure TB PCR sent. AFB from bronchoscopy pending.  Holding on treatment at this point as may be contaminate   -- Given positive AFB would place in respiratory isolation   -- If BAL studies from bronchoscopy unrevealing will likely need IR transthoracic biopsy               Thank you for your consult. I will follow-up with patient. Please contact us if you have any additional questions.     Freddy Caemron MD  Pulmonology/Critical Care   [facility]

## 2018-10-16 NOTE — HPI
Diego Gunter is a 78 y.o. male that (in part)  has a past medical history of Dysphagia, colonic polyps, Hyperlipidemia LDL goal <100, Overweight(278.02), Prostate cancer, and Type II or unspecified type diabetes mellitus without mention of complication, not stated as uncontrolled.  has a past surgical history that includes Prostatectomy; Cataract extraction, bilateral (2014); and ESOPHAGOGASTRODUODENOSCOPY (EGD) (N/A, 2/25/2016). Presents  to [facility] complaining of shortness of breath.  Patient has a known hepatorenal lesion in underwent bronchoscopy had [facility] earlier today.  After arriving home he had increased work of breathing, shortness of breath, and pain the radiated to his back between the shoulder blades.  Subacute onset after the procedure.  A call was placed to his pulmonologist who recommended he come to the emergency department for further evaluation.  Associated with fever and cough.  Denies hemoptysis.  Worsened with exertion.  Minimally relieved with rest and supplemental oxygen given in the ED.  Radiation throughout chest , but primarily between shoulder blades.    In the emergency department routine laboratory studies and chest x-ray were obtained.  This was followed by CT angiogram of the chest which demonstrated some of the previous abnormalities that were known.  Detailed below.  Patient was started on empiric antibiotics including ceftriaxone and doxycycline.  Supplemental oxygen was provided.  He had evidence of a fever.  Cultures were obtained prior to antibiotics being given.  Pulmonology will be consulted.    Hospital medicine has been asked to admit for further evaluation and treatment.

## 2018-10-16 NOTE — HPI
Diego Gunter is a 78 y.o. male  has a past medical history of Dysphagia, colonic polyps, Hyperlipidemia LDL goal <100, Overweight(278.02), Prostate cancer (september 2011), and Type II or unspecified type diabetes mellitus without mention of complication, not stated as uncontrolled.  Patient was diagnosed with prostate in 2011 s/p prostatectomy.  Patient with remote smoking history and quit in 1962.  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     Patient with persistent productive cough x 1 year.  +dark brown phlegm.  No hemoptysis.  Patient underwent CT of chest by pcp which revealed rul cavitary lesion.  Patient is here for pulmonary evaluation.  +weight loss 30 lbs since 2016. Underwent FOB yesterday morning at [facility] with BAL RUL.. No issues immediately post procedure.. Around noon went to eat lunch and began with ongoing rigors/chills. Did not improve with conservative measures at home and presented to  ED. Found to be febrile to 101, hypoxic.. Admited to hospital medicine service and treated with abx, supplemental O2.. Now on RA. No additional febrile episodes. Hemodynamically stable. Breathing much improved.

## 2018-10-16 NOTE — NURSING
Pt arrived to floor via wheelchair A/O and able to make needs known.Pt transported safely in bed.Pt on cardiac monitoring.Teds and SCDs placed.Saftey measures maintained.Call bell placed within reach.Will continue to monitor.

## 2018-10-16 NOTE — H&P
[facility] Medicine  History & Physical    Patient Name: Diego Gunter  MRN: [id number]  Admission Date: 10/16/2018  Attending Physician: Lucian Hernandes MD, MPH      PCP:     Portia Ferreira MD    CC:     Chief Complaint   Patient presents with    Shortness of Breath     Pt had scope of lungs today at [facility]. Pt d/c home and has had increasing SOB and back pain between shoulder blades. Symptoms started after procedure today. Sent in by Pulmo. for evaluation.    Back Pain       HISTORY OF PRESENT ILLNESS:     Diego Gunter is a 78 y.o. male that (in part)  has a past medical history of Dysphagia, colonic polyps, Hyperlipidemia LDL goal <100, Overweight(278.02), Prostate cancer, and Type II or unspecified type diabetes mellitus without mention of complication, not stated as uncontrolled.  has a past surgical history that includes Prostatectomy; Cataract extraction, bilateral (2014); and ESOPHAGOGASTRODUODENOSCOPY (EGD) (N/A, 2/25/2016). Presents  to [facility] Emergency Department complaining of shortness of breath.  Patient has a known hepatorenal lesion in underwent bronchoscopy had [facility] earlier today.  After arriving home he had increased work of breathing, shortness of breath, and pain the radiated to his back between the shoulder blades.  Subacute onset after the procedure.  A call was placed to his pulmonologist who recommended he come to the emergency department for further evaluation.  Associated with fever and cough.  Denies hemoptysis.  Worsened with exertion.  Minimally relieved with rest and supplemental oxygen given in the ED.  Radiation throughout chest , but primarily between shoulder blades.    In the emergency department routine laboratory studies and chest x-ray were obtained.  This was followed by CT angiogram of the chest which demonstrated some of the previous abnormalities that were known.  Detailed below.   Patient was started on empiric antibiotics including ceftriaxone and doxycycline.  Supplemental oxygen was provided.  He had evidence of a fever.  Cultures were obtained prior to antibiotics being given.  Pulmonology will be consulted.    Hospital medicine has been asked to admit for further evaluation and treatment.       REVIEW OF SYSTEMS:     -- Constitutional:  Positive for fever.  Denies chills.  Positive for generalized malaise.  -- Eyes: No visual changes, diplopia, pain, tearing, blind spots, or discharge.   -- Ears, nose, mouth, throat, and face: No congestion, sore throat, epistaxis, d/c, bleeding gums, neck stiffness masses, or dental issues.  -- Respiratory:  As above in the HPI  -- Cardiovascular:  Chest pain as above in the HPI.  No syncope, PND, edema, cyanosis, or palpitations.   -- Gastrointestinal: No vomiting, abdominal pain, hematemesis, melena, dyspepsia, or change in bowel habits.  -- Genitourinary: No hematuria, dysuria, frequency, urgency, nocturia, polyuria, stones, or incontinence.  -- Integument/breast: No rash, pruritis, pigmentation changes, dryness, or changes in hair  -- Hematologic/lymphatic: No easy bruising or lymphadenopathy.   -- Musculoskeletal:  Chronic arthralgias.  No acute arthralgias, acute myalgias, joint swelling, acute limitations of ROM, or acute muscular weakness.  -- Neurological: No seizures, headaches, incoordination, paraesthesias, ataxia, vertigo, or tremors.  -- Behavioral/Psych: No auditory or visual hallucinations, depression, or suicidal/homicidal ideations.  -- Endocrine: No heat or cold intolerance, polydipsia, or unintentional weight gain / loss.  -- Allergy/Immunologic: No recurrent infections or adverse reaction to food, insects, or difficulty breathing.      PAST MEDICAL / SURGICAL HISTORY:     Past Medical History:   Diagnosis Date    Dysphagia     Hx of colonic polyps     followed by GI. Dr. Pham    Hyperlipidemia LDL goal <100      Overweight(278.02)     Prostate cancer 2011    followed by urology, Dr. Rogers    Type II or unspecified type diabetes mellitus without mention of complication, not stated as uncontrolled     diet controlled     Past Surgical History:   Procedure Laterality Date    CATARACT EXTRACTION, BILATERAL  2014    ESOPHAGOGASTRODUODENOSCOPY (EGD) N/A 2016    Per[facility]O    PROSTATECTOMY           FAMILY HISTORY:     Family History   Problem Relation Age of Onset    Colon cancer Mother     Diabetes Mother     Heart disease Father     Mental illness Sister     Diabetes Sister     Other Brother         polio as a child    Diabetes Unknown     Diabetes Brother     Myasthenia gravis Brother     Parkinsonism Brother     Diabetes Brother     Dementia Brother     Lung cancer Brother         smoker    Diabetes Maternal Aunt          SOCIAL HISTORY:     Social History     Socioeconomic History    Marital status:      Spouse name: None    Number of children: 2    Years of education: None    Highest education level: None   Social Needs    Financial resource strain: None    Food insecurity - worry: None    Food insecurity - inability: None    Transportation needs - medical: None    Transportation needs - non-medical: None   Occupational History    Occupation: tool    Tobacco Use    Smoking status: Former Smoker     Packs/day: 0.25     Years: 5.00     Pack years: 1.25     Last attempt to quit: 10/2/1962     Years since quittin.0    Smokeless tobacco: Never Used    Tobacco comment: quit age 23   Substance and Sexual Activity    Alcohol use: Yes     Comment: beer occasionally    Drug use: No    Sexual activity: Yes     Partners: Female   Other Topics Concern    None   Social History Narrative    None         ALLERGIES:       Review of patient's allergies indicates:  No Known Allergies      HEALTH SCREENING:     Influenza vaccine   "up-to-date for this season.  Prevnar 13 pneumonia vaccine =  evidence of previous vaccination found in the medical record      HOME MEDICATIONS:     Prior to Admission medications    Medication Sig Start Date End Date Taking? Authorizing Provider   pravastatin (PRAVACHOL) 10 MG tablet Take 1 tablet (10 mg total) by mouth once daily. 9/21/18  Yes Portia Ferreira MD   ascorbic acid (VITAMIN C) 1000 MG tablet Take 1,000 mg by mouth once daily.      Historical Provider, MD   omega 3-dha-epa-fish oil 1,000 (120-180) mg Cap Take 1 capsule by mouth once daily.      Historical Provider, MD   omeprazole 20 mg TbEC 1/2 tablet daily in am as needed. 9/21/18   Portia Ferreira MD          HOSPITAL MEDICATIONS:     Scheduled Meds:    acetaminophen        cefTRIAXone (ROCEPHIN) IVPB  1 g Intravenous Q24H    doxycycline (VIBRAMYCIN) IVPB  100 mg Intravenous Q12H    pantoprazole  40 mg Oral Daily    pravastatin  10 mg Oral Daily    senna-docusate 8.6-50 mg  1 tablet Oral BID     Continuous Infusions:   PRN Meds: acetaminophen, acetaminophen, morphine, ondansetron, oxyCODONE, ramelteon      PHYSICAL EXAM:     Wt Readings from Last 1 Encounters:   10/15/18 2353 74.6 kg (164 lb 6.4 oz)   10/15/18 1649 74.8 kg (165 lb)     Body mass index is 24.28 kg/m².  Vitals:    10/15/18 2205 10/15/18 2223 10/15/18 2250 10/15/18 2353   BP:  111/65  (!) 95/59   BP Location:  Left arm  Left arm   Patient Position:  Lying  Lying   Pulse:  80  70   Resp:  18 18 18   Temp: 100 °F (37.8 °C)  99.1 °F (37.3 °C) 98.1 °F (36.7 °C)   TempSrc:   Oral Oral   SpO2:  (!) 93%  (!) 93%   Weight:    74.6 kg (164 lb 6.4 oz)   Height:    5' 9" (1.753 m)          -- General appearance: well developed. appears stated age   -- Head: normocephalic, atraumatic   -- Eyes: conjunctivae clear. Extraocular muscles intact  -- Nose: Nares normal. Septum midline.   -- Mouth/Throat: lips, mucosa, and tongue normal. no throat erythema.   -- Neck: supple, symmetrical, " trachea midline, no JVD and thyroid not grossly enlarged, appears symmetric  -- Lungs: clear to auscultation bilaterally. normal respiratory effort. No use of accessory muscles.   -- Chest wall: no tenderness. equal bilateral chest rise   -- Heart: regular rate and regular rhythm. S1, S2 normal.  no click, rub or gallop   -- Abdomen: soft, non-tender, non-distended, non-tympanic; bowel sounds normal; no masses  -- Extremities: no cyanosis, clubbing or edema.   -- Pulses: 2+ and symmetric   -- Skin: color normal, texture normal, turgor normal. No rashes or lesions.   -- Neurologic: Normal strength and tone. No focal numbness or weakness. CNII-XII intact. [place] coma scale: eyes open spontaneously-4, oriented & converses-5, obeys commands-6.      LABORATORY STUDIES:     Recent Results (from the past 36 hour(s))   Culture, Respiratory    Collection Time: 10/15/18  9:08 AM   Result Value Ref Range    Gram Stain (Respiratory) <10 epithelial cells per low power field.     Gram Stain (Respiratory) Rare WBC's     Gram Stain (Respiratory) Rare Gram positive cocci    CBC auto differential    Collection Time: 10/15/18  3:25 PM   Result Value Ref Range    WBC 12.60 3.90 - 12.70 K/uL    RBC 4.99 4.60 - 6.20 M/uL    Hemoglobin 14.4 14.0 - 18.0 g/dL    Hematocrit 43.2 40.0 - 54.0 %    MCV 87 82 - 98 fL    MCH 28.9 27.0 - 31.0 pg    MCHC 33.3 32.0 - 36.0 g/dL    RDW 14.2 11.5 - 14.5 %    Platelets 230 150 - 350 K/uL    MPV 10.0 9.2 - 12.9 fL    Gran # (ANC) 11.1 (H) 1.8 - 7.7 K/uL    Lymph # 0.8 (L) 1.0 - 4.8 K/uL    Mono # 0.7 0.3 - 1.0 K/uL    Eos # 0.0 0.0 - 0.5 K/uL    Baso # 0.02 0.00 - 0.20 K/uL    Gran% 87.7 (H) 38.0 - 73.0 %    Lymph% 6.7 (L) 18.0 - 48.0 %    Mono% 5.2 4.0 - 15.0 %    Eosinophil% 0.2 0.0 - 8.0 %    Basophil% 0.2 0.0 - 1.9 %    Differential Method Automated    Comprehensive metabolic panel    Collection Time: 10/15/18  3:25 PM   Result Value Ref Range    Sodium 140 136 - 145 mmol/L    Potassium 4.1 3.5 -  5.1 mmol/L    Chloride 105 95 - 110 mmol/L    CO2 27 23 - 29 mmol/L    Glucose 161 (H) 70 - 110 mg/dL    BUN, Bld 7 (L) 8 - 23 mg/dL    Creatinine 1.0 0.5 - 1.4 mg/dL    Calcium 9.2 8.7 - 10.5 mg/dL    Total Protein 7.2 6.0 - 8.4 g/dL    Albumin 3.7 3.5 - 5.2 g/dL    Total Bilirubin 0.9 0.1 - 1.0 mg/dL    Alkaline Phosphatase 73 55 - 135 U/L    AST 32 10 - 40 U/L    ALT 23 10 - 44 U/L    Anion Gap 8 8 - 16 mmol/L    eGFR if African American >60 >60 mL/min/1.73 m^2    eGFR if non African American >60 >60 mL/min/1.73 m^2   Troponin I #1    Collection Time: 10/15/18  3:25 PM   Result Value Ref Range    Troponin I <0.006 0.000 - 0.026 ng/mL   B-Type natriuretic peptide (BNP)    Collection Time: 10/15/18  3:25 PM   Result Value Ref Range    BNP 13 0 - 99 pg/mL   POCT glucose    Collection Time: 10/15/18  5:31 PM   Result Value Ref Range    POCT Glucose 164 (H) 70 - 110 mg/dL   Lactic acid, plasma    Collection Time: 10/15/18  6:34 PM   Result Value Ref Range    Lactate (Lactic Acid) 2.6 (H) 0.5 - 2.2 mmol/L   Troponin I #2    Collection Time: 10/15/18  8:15 PM   Result Value Ref Range    Troponin I <0.006 0.000 - 0.026 ng/mL       Lab Results   Component Value Date    INR 1.0 08/25/2009     Lab Results   Component Value Date    HGBA1C 5.5 09/14/2018     Recent Labs      10/15/18   1731   POCTGLUCOSE  164*           MICROBIOLOGY DATA:     AFB Culture & Smear   Date Value Ref Range Status   10/03/2018 Culture positive, identification pending  Preliminary   10/03/2018   Preliminary    Results called to and read back by: Twyla Larry  10/10/2018  15:30    10/03/2018 for Dr Gurrola  Preliminary       Microbiology x 7d:   Microbiology Results (last 7 days)     Procedure Component Value Units Date/Time    Blood Culture #1 **CANNOT BE ORDERED STAT** [[id number]] Collected:  10/15/18 1834    Order Status:  Sent Specimen:  Blood from Peripheral, Hand, Left Updated:  10/15/18 1859    Blood culture [[id number]] Collected:  10/15/18 1839     Order Status:  Sent Specimen:  Blood from Peripheral, Hand, Right Updated:  10/15/18 1859            IMAGING:     Imaging Results          CTA Chest Abdomen Pelvis (Final result)  Result time 10/15/18 19:43:12   Procedure changed from CTA Chest Abdomen Non Coronary     Final result by Kane Miguel MD (10/15/18 19:43:12)                 Impression:      1. No evidence of aortic aneurysm or dissection.  2. Known right upper lobe neoplastic versus infectious cavitary lesion.  3. Moderate pulmonary emphysema with mild diffuse ground-glass attenuation which could reflect superimposed edema versus other diffuse infectious or inflammatory process.  4. 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  5. Additional findings as detailed above.      Electronically signed by: Kane Miguel MD  Date:    10/15/2018  Time:    19:43             Narrative:    EXAMINATION:  CTA CHEST ABDOMEN PELVIS    CLINICAL HISTORY:  pain, SOB, fever after bronchoscopy. CP with radiation between shoulder blades and to neck;    TECHNIQUE:  CTA chest abdomen and pelvis was performed following administration of 100 cc Omnipaque 350 IV contrast.    COMPARISON:  CT chest from 09/27/2018.    FINDINGS:  No evidence of aortic aneurysm or dissection.  Heart is normal in size without pericardial effusion.  No abnormalities are seen along the esophageal course.  Moderate emphysematous changes are seen in the lungs with superimposed ground-glass attenuation which can be seen in setting of edema or diffuse infectious or inflammatory process.  There is redemonstration of known cavitary right upper lobe lesion.  Airways are patent.  Liver, gallbladder, spleen, pancreas, and adrenal glands unremarkable.  Stomach is mildly distended.  No evidence of bowel obstruction.  There is descending/sigmoid diverticulosis.  Appendix is normal.  Urinary bladder is distended.  Kidneys show no evidence of hydronephrosis.  There is 2 cm  partial fat density lesion visualized within the right kidney most suggestive for angiomyolipoma.  Degenerative changes are visualized within the spine.                               X-Ray Chest AP Portable (Final result)  Result time 10/15/18 17:44:55    Final result by Kane Miguel MD (10/15/18 17:44:55)                 Impression:      As above.      Electronically signed by: Kane Miguel MD  Date:    10/15/2018  Time:    17:44             Narrative:    EXAMINATION:  XR CHEST AP PORTABLE    CLINICAL HISTORY:  Dorsalgia, unspecified    TECHNIQUE:  Single frontal view of the chest was performed.    COMPARISON:  10/06/2018.  CT chest from 09/27/2018.    FINDINGS:  Heart is normal in size.  Lungs are symmetrically expanded.  Emphysematous changes are seen.  Redemonstration of focal airspace opacity seen within the right upper lung zone.  Lucent cavitary component is not as evident compared to recent radiograph and previous CT.  No evidence of new focal consolidation, pneumothorax, or significant effusion.  No acute osseous abnormality identified.                                  CONSULTS:     IP CONSULT TO PULMONOLOGY       ASSESSMENT & PLAN:     Primary Diagnosis:  Pneumonia of both lungs due to infectious organism    Active Hospital Problems    Diagnosis  POA    *Pneumonia of both lungs due to infectious organism [J18.9]  Yes     Priority: 1 - High    Abnormal CT of the chest [R93.89]  Yes     Priority: 2      1. No evidence of aortic aneurysm or dissection.  2. Known right upper lobe neoplastic versus infectious cavitary lesion.  3. Moderate pulmonary emphysema with mild diffuse ground-glass attenuation which could reflect superimposed edema versus other diffuse infectious or inflammatory process.  4. 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  5. Additional findings as detailed above.     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         Bilateral Pneumonia as well as abnormal chest CT demonstrating cavitary lung lesion and chronic hemoptysis  · As evidence by history, physical exam, chest x-ray  · Pulmonology consult to evaluate acute decline in respiratory status as well as to continue further investigation of abnormalities noted on CT of chest.  · Bilateral changes on CT imaging consistent with probable pneumonia  · 1 out of 4 SIRS criteria  · Initiate IV antibiotics for community-acquired pneumonia with ceftriaxone and doxy.   · If clinical improvement is not seen by tomorrow, broaden antibiotic coverage, further tailored by sputum Gram stain and culture results  · Robitussin  · Tessalon Perles  · Incentive spirometry, aspiration precautions  · Consider chest physiotherapy as course dictates  · Nebulizer treatments scheduled  · If clinical improvement is not obtained with the treatment above consider ordering PPD, quantiferon gold, histoplasma, blastomycosis urine antigens, aspergillus antigen, cryptococcus antigen, procalcitonin, and/or modified AFB.    Hypertension  · Goal while inpatient is a systolic blood pressure less than 160mmHg  · BP in acceptable range at this time  · Continue current home regimen with hold parameters  · PRN antihypertensives available    Hyperlipidemia   · Lipid panel - as an outpatient  · Cardiac diet  · Continue statin    Hyperlipidemia   · Lipid panel - as an outpatient  · Cardiac diet  · Continue statin          VTE Risk Mitigation (From admission, onward)        Ordered     IP VTE HIGH RISK PATIENT  Once      10/15/18 2343     Place HAILE hose  Until discontinued      10/15/18 2343     Place sequential compression device  Until  discontinued      10/15/18 9864            Adult PRN medications available   DVT prophylaxis given       DISPOSITION:     Will admit to the Hospital Medicine service for further evaluation and treatment.    Chart reviewed and updated where applicable.    High Risk Conditions:  Patient has a condition that poses threat to life and bodily function: Severe Respiratory Distress      ===============================================================    Lucian Hernandes MD, MPH  Department of Hospital Medicine   [facility]  [phone] pg  (7pm - 6am)          This note is dictated using Dragon Medical 360 voice recognition software.  There are word recognition mistakes that are occasionally missed on review.

## 2018-10-17 VITALS
OXYGEN SATURATION: 95 % | HEART RATE: 58 BPM | BODY MASS INDEX: 24.35 KG/M2 | SYSTOLIC BLOOD PRESSURE: 113 MMHG | HEIGHT: 69 IN | DIASTOLIC BLOOD PRESSURE: 66 MMHG | TEMPERATURE: 97 F | RESPIRATION RATE: 18 BRPM | WEIGHT: 164.38 LBS

## 2018-10-17 PROBLEM — J18.9 PNEUMONIA OF BOTH LUNGS DUE TO INFECTIOUS ORGANISM: Status: RESOLVED | Noted: 2018-10-15 | Resolved: 2018-10-17

## 2018-10-17 LAB — POCT GLUCOSE: 103 MG/DL (ref 70–110)

## 2018-10-17 PROCEDURE — 25000003 PHARM REV CODE 250: Performed by: EMERGENCY MEDICINE

## 2018-10-17 PROCEDURE — 99232 SBSQ HOSP IP/OBS MODERATE 35: CPT | Mod: ,,, | Performed by: EMERGENCY MEDICINE

## 2018-10-17 RX ORDER — AMOXICILLIN AND CLAVULANATE POTASSIUM 875; 125 MG/1; MG/1
1 TABLET, FILM COATED ORAL 2 TIMES DAILY
Qty: 10 TABLET | Refills: 0 | Status: SHIPPED | OUTPATIENT
Start: 2018-10-17 | End: 2018-10-23 | Stop reason: ALTCHOICE

## 2018-10-17 RX ORDER — ACETAMINOPHEN 325 MG/1
650 TABLET ORAL EVERY 4 HOURS PRN
Refills: 0 | COMMUNITY
Start: 2018-10-17 | End: 2018-10-23

## 2018-10-17 RX ADMIN — PRAVASTATIN SODIUM 10 MG: 10 TABLET ORAL at 09:10

## 2018-10-17 RX ADMIN — PANTOPRAZOLE SODIUM 40 MG: 40 TABLET, DELAYED RELEASE ORAL at 09:10

## 2018-10-17 RX ADMIN — DOXYCYCLINE 100 MG: 100 INJECTION, POWDER, LYOPHILIZED, FOR SOLUTION INTRAVENOUS at 09:10

## 2018-10-17 NOTE — HOSPITAL COURSE
Patient with persistent productive cough x 1 year.  +dark brown phlegm.  No hemoptysis.  Patient underwent CT of chest by pcp which revealed rul cavitary lesion.  Patient is here for pulmonary evaluation.  +weight loss 30 lbs since 2016. Underwent FOB yesterday morning at [facility] with BAL RUL.. No issues immediately post procedure.. Around noon went to eat lunch and began with ongoing rigors/chills. Did not improve with conservative measures at home and presented to  ED. Found to be febrile to 101, hypoxic. Admited to hospital medicine service and treated with abx, supplemental O2.  Patient quickly improved and was able to be taken off oxygen. Now on RA. No additional febrile episodes. Hemodynamically stable. Breathing much improved.  Pulmonary consulted.  Possible post bronch pneumonitis causing symptoms and not pneumonia.  Pneumonia has been ruled out, but patient will be given 5 more days of Augmentin upon discharge.  He will be discharged home to follow up with Dr. Gurrola.

## 2018-10-17 NOTE — PLAN OF CARE
"   10/17/18 1018   Final Note   Assessment Type Final Discharge Note   Discharge Disposition Home   What phone number can be called within the next 1-3 days to see how you are doing after discharge? [phone]   Hospital Follow Up  Appt(s) scheduled? Yes   Discharge plans and expectations educations in teach back method with documentation complete? Yes     EDUCATION:  Pt provided with educational information on Hypoxia.  Information reviewed and placed in :My Healthcare Packet" to be brought home for  use as resource after discharge.  Information included:  signs and symptoms to look for at discharge. ROLAND instructed pt to call the doctor if experiencing symptoms that may indicate a medical emergency: CALL 911 if signs and symptoms worsen. ROLAND asked pt to provide SW with two signs and symptoms recently discussed.  Pt stated SOB.     Reminded pt things he will be responsible for to manage his healthcare at home: getting Rx filled, attending follow up appointments, and taking medication as prescribed were discussed.   Teach back method used.  All questions answered.  Patient verbalized understanding of all information.      ROLAND provided pt with a copy of follow-up appointments. ROLAND explained/highlighted date, time, and location of each appointment. ROLAND provided pt with a blue folder and instructed pt to place all medical documents in blue folder. ROLAND explained to pt the nurse will provide an AVS with diagnosis and instructed pt to place in blue folder and bring to follow-up appointment. ROLAND notified pt nurse, Addis, all CM needs have been met.     "

## 2018-10-17 NOTE — PROGRESS NOTES
[facility] [place]  [place] Note    Patient Name: Diego Gunter  MRN: [id number]  Admission Date: 10/15/2018  Hospital Length of Stay: 2 days  Code Status: Full Code  Attending Provider: Julien Paul MD  Primary Care Provider: Portia Ferreira MD   Principal Problem: Pneumonia of both lungs due to infectious organism    Subjective:       Interval History-     No acute events overnight. Breathing at baseline. Still with intermittent cough. No hemoptysis. Wants to go home.       Past medical history/surgical history reviewed     Review of patient's allergies indicates:  No Known Allergies    Family History     Problem Relation (Age of Onset)    Colon cancer Mother    Dementia Brother    Diabetes Mother, Sister, , Brother, Brother, Maternal Aunt    Heart disease Father    Lung cancer Brother    Mental illness Sister    Myasthenia gravis Brother    Other Brother    Parkinsonism Brother        Tobacco Use    Smoking status: Former Smoker     Packs/day: 0.25     Years: 5.00     Pack years: 1.25     Last attempt to quit: 10/2/1962     Years since quittin.0    Smokeless tobacco: Never Used    Tobacco comment: quit age 23   Substance and Sexual Activity    Alcohol use: Yes     Comment: beer occasionally    Drug use: No    Sexual activity: Yes     Partners: Female         Review of Systems   Constitutional: Positive for appetite change, chills and fever. Negative for activity change, diaphoresis, fatigue and unexpected weight change.   HENT: Negative for congestion, ear discharge, ear pain, hearing loss, rhinorrhea, sore throat, trouble swallowing and voice change.    Eyes: Negative for pain, discharge, redness and visual disturbance.   Respiratory: Positive for chest tightness (resolved ) and shortness of breath (resolved). Negative for wheezing and stridor.    Cardiovascular: Negative for chest pain, palpitations and leg swelling.   Gastrointestinal: Negative for abdominal  distention, abdominal pain, blood in stool, nausea and vomiting.   Endocrine: Negative for polydipsia and polyuria.   Genitourinary: Negative for decreased urine volume, difficulty urinating, flank pain and hematuria.   Musculoskeletal: Positive for back pain (resolved ). Negative for arthralgias, joint swelling, myalgias, neck pain and neck stiffness.   Skin: Negative for pallor, rash and wound.   Neurological: Negative for dizziness, syncope, weakness and headaches.   All other systems reviewed and are negative.    Objective:     Vital Signs (Most Recent):  Temp: 97 °F (36.1 °C) (10/17/18 0729)  Pulse: 63 (10/17/18 0729)  Resp: 18 (10/17/18 0729)  BP: 113/66 (10/17/18 0729)  SpO2: 95 % (10/17/18 0729) Vital Signs (24h Range):  Temp:  [97 °F (36.1 °C)-98 °F (36.7 °C)] 97 °F (36.1 °C)  Pulse:  [49-82] 63  Resp:  [18-20] 18  SpO2:  [95 %-97 %] 95 %  BP: (113-153)/(66-84) 113/66     Weight: 74.6 kg (164 lb 6.4 oz)  Body mass index is 24.28 kg/m².      Intake/Output Summary (Last 24 hours) at 10/17/2018 0925  Last data filed at 10/16/2018 2154  Gross per 24 hour   Intake 300 ml   Output --   Net 300 ml       Physical Exam   Constitutional: He is oriented to person, place, and time. He appears well-developed and well-nourished. No distress.   HENT:   Head: Normocephalic and atraumatic.   Right Ear: External ear normal.   Left Ear: External ear normal.   Nose: Nose normal.   Mouth/Throat: Oropharynx is clear and moist. No oropharyngeal exudate.   Eyes: Conjunctivae and EOM are normal. Pupils are equal, round, and reactive to light. Right eye exhibits no discharge. Left eye exhibits no discharge. No scleral icterus.   Neck: Normal range of motion. Neck supple. No JVD present. No tracheal deviation present. No thyromegaly present.   Cardiovascular: Normal rate, regular rhythm and normal heart sounds. Exam reveals no gallop and no friction rub.   No murmur heard.  Pulmonary/Chest: Effort normal and breath sounds normal. No  stridor. No respiratory distress. He has no wheezes. He has no rales. He exhibits no tenderness.   Abdominal: Soft. Bowel sounds are normal. He exhibits no distension and no mass. There is no tenderness. There is no rebound and no guarding.   Musculoskeletal: Normal range of motion. He exhibits no edema, tenderness or deformity.   Lymphadenopathy:     He has no cervical adenopathy.   Neurological: He is alert and oriented to person, place, and time.   Non focal    Skin: He is not diaphoretic.       Vents:       Lines/Drains/Airways     Peripheral Intravenous Line                 Peripheral IV - Single Lumen 10/15/18 1736 Right Antecubital 1 day                Significant Labs:    CBC/Anemia Profile:  Recent Labs   Lab  10/15/18   1525   WBC  12.60   HGB  14.4   HCT  43.2   PLT  230   MCV  87   RDW  14.2        Chemistries:  Recent Labs   Lab  10/15/18   1525   NA  140   K  4.1   CL  105   CO2  27   BUN  7*   CREATININE  1.0   CALCIUM  9.2   ALBUMIN  3.7   PROT  7.2   BILITOT  0.9   ALKPHOS  73   ALT  23   AST  32     HIV- negative   TB IGRA negative     LA- 2.6   BNP-13       MICRO-     Blood Cultures- NGTD     Gram Stain (Respiratory) <10 epithelial cells per low power field.    Gram Stain (Respiratory) Rare WBC's    Gram Stain (Respiratory) Rare Gram positive cocci    Resulting Agency [facility]      Narrative   Performed by: [facility]   Bronchial Wash     AFB- bronch- pending     Fungal culture BAL- pending      AFB Culture & Smear Culture positive, identification pending P   AFB Culture & Smear Results called to and read back by: Twyla Larry  10/10/2018  15:30  P   AFB Culture & Smear for Dr Gurrola P   AFB CULTURE STAIN No acid fast bacilli seen.        Cytology- pending     Significant Imaging:   CT: I have reviewed all pertinent results/findings within the past 24 hours and my personal findings are:  1. No evidence of aortic aneurysm or dissection.     1. No evidence of aortic aneurysm or dissection.  2. Known right  upper lobe neoplastic versus infectious cavitary lesion.  3. Moderate pulmonary emphysema with mild diffuse ground-glass attenuation which could reflect superimposed edema versus other diffuse infectious or inflammatory process.  4. 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  5. Additional findings as detailed above    CT Chest 9/2018- RUL cavitary lesion       PFTs:     Pre FEV1 2.04 - 3.76 L 3.41    Pre FEV1 FVC 61.07 - 89.60 % 82.28    Pre FVC 2.83 - 4.92 L 4.15    Pre PEF 5.23 - 9.71 L/s 6.91    Pre FEF 25 75 0.85 - 3.39 L/s 2.68    Pre  sec 3.50    Pre DLCO 18.14 - 32.00 ml/(min*mmHg) 39.22 Abnormally high           Assessment/Plan:     Cavitary lesion of lung    77 y/o male with recently identified RUL 4.7cm cavitary lesion of lung. Ongoing sputum production, chronic cough and weight loss over the last year. Underwent diagnostic bronchoscopy one day prior to admission and results still pending. Post bronchoscopy developed fever and rigors with marginal BP and elevated LA on ED presentation. Suspect that his SIRS response related to inflammatory reaction post bronchoscopy.. He has clinically improved. Afebrile. All cultures negative to date. Rapid grower AFB from 10/3.. Not yet identified. AFB smear on bronchoscopy negative.     -- OK to discharge from my standpoint. Augmentin x 5 days   -- await bronchoscopy data  -- Follow up with Burak Gurrola in Pulmonary clinic as scheduled.                  Freddy Cameron MD  Pulmonology/Critical Care   [facility] [place]

## 2018-10-17 NOTE — PROGRESS NOTES
WRITTEN HEALTHCARE DISCHARGE INFORMATION     Things that YOU are RESPONSIBLE for to Manage Your Care At Home:    1. Getting your prescriptions filled.  2. Taking you medications as directed. DO NOT MISS ANY DOSES!  3. Going to your follow-up doctor appointments. This is important because it allows the doctor to monitor your progress and to determine if any changes need to be made to your treatment plan.    If you are unable to make your follow up appointments, please call the number listed and reschedule this appointment.     ____________HELP AT HOME____________________    Experiencing any SIGNS or SYMPTOMS: YOU CAN    Schedule a same day appopintment with your Primary Care Doctor or  you can call [facility] On [facility] Care Line for 24/7 assistance at [phone]    If you are experience any signs or symptoms that have become severe, Call 911 and come to your nearest Emergency Room.    Thank you for choosing [facility] and allowing us to care for you.   From your care management team: Katharina NASSAR [facility] [place] [phone]    You should receive a call from [facility] within 48-72 hours to help manage your care after discharge. Please try to make sure that you answer your phone for this important phone call.  Follow-up Information     Burak Gurrola MD On 10/26/2018.    Specialties:  Pulmonary Disease, Sleep Medicine  Why:  @ 9:00AM.   Contact information:  [address]  [facility] [address]  [phone]             Portia Ferreira MD On 10/23/2018.    Specialties:  Internal Medicine, Pediatrics  Why:  Outpatient Services, PCP appointment @ 8:00AM.   Contact information:  [address] [place] [address]  [phone]

## 2018-10-17 NOTE — DISCHARGE SUMMARY
[facility] Hospital Medicine  Discharge Summary      Patient Name: Diego Gunter  MRN: [id number]  Admission Date: 10/15/2018  Hospital Length of Stay: 2 days  Discharge Date and Time:  10/17/2018 9:38 AM  Attending Physician: Julien Paul MD   Discharging Provider: Julien Paul MD  Primary Care Provider: Portia Ferreira MD      HPI:     Diego Gunter is a 78 y.o. male that (in part)  has a past medical history of Dysphagia, colonic polyps, Hyperlipidemia LDL goal <100, Overweight(278.02), Prostate cancer, and Type II or unspecified type diabetes mellitus without mention of complication, not stated as uncontrolled.  has a past surgical history that includes Prostatectomy; Cataract extraction, bilateral (2014); and ESOPHAGOGASTRODUODENOSCOPY (EGD) (N/A, 2/25/2016). Presents  to [facility] Emergency Department complaining of shortness of breath.  Patient has a known hepatorenal lesion in underwent bronchoscopy had [facility] earlier today.  After arriving home he had increased work of breathing, shortness of breath, and pain the radiated to his back between the shoulder blades.  Subacute onset after the procedure.  A call was placed to his pulmonologist who recommended he come to the emergency department for further evaluation.  Associated with fever and cough.  Denies hemoptysis.  Worsened with exertion.  Minimally relieved with rest and supplemental oxygen given in the ED.  Radiation throughout chest , but primarily between shoulder blades.    In the emergency department routine laboratory studies and chest x-ray were obtained.  This was followed by CT angiogram of the chest which demonstrated some of the previous abnormalities that were known.  Detailed below.  Patient was started on empiric antibiotics including ceftriaxone and doxycycline.  Supplemental oxygen was provided.  He had evidence of a fever.  Cultures were obtained prior to  antibiotics being given.  Pulmonology will be consulted.    Hospital medicine has been asked to admit for further evaluation and treatment.     * No surgery found *      Hospital Course:   Patient with persistent productive cough x 1 year.  +dark brown phlegm.  No hemoptysis.  Patient underwent CT of chest by pcp which revealed rul cavitary lesion.  Patient is here for pulmonary evaluation.  +weight loss 30 lbs since 2016. Underwent FOB yesterday morning at [facility] with BAL RUL.. No issues immediately post procedure.. Around noon went to eat lunch and began with ongoing rigors/chills. Did not improve with conservative measures at home and presented to  ED. Found to be febrile to 101, hypoxic. Admited to hospital medicine service and treated with abx, supplemental O2.  Patient quickly improved and was able to be taken off oxygen. Now on RA. No additional febrile episodes. Hemodynamically stable. Breathing much improved.  Pulmonary consulted.  Possible post bronch pneumonitis causing symptoms and not pneumonia.  Pneumonia has been ruled out, but patient will be given 5 more days of Augmentin upon discharge.  He will be discharged home to follow up with Dr. Gurrola.         Consults:   Consults (From admission, onward)        Status Ordering Provider     Inpatient consult to Pulmonology  Once     Provider:  Burak Gurrola MD    Completed DOROTHY GARCIA          No new Assessment & Plan notes have been filed under this hospital service since the last note was generated.  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    Specialties:  Pulmonary Disease, Sleep Medicine  Why:  As scheduled  Contact information:  [address]  [facility] [address]  [phone]             Portia Ferreira MD In 4 weeks.    Specialties:  Internal Medicine, Pediatrics  Contact information:  [address] [place] [id number] [phone]                 Patient Instructions:      Diet Cardiac     Notify your health care provider if you experience any of the following:  temperature >100.4     Notify your health care provider if you experience any of the following:  persistent nausea and vomiting or diarrhea     Notify your health care provider if you experience any of the following:  difficulty breathing or increased cough     Notify your health care provider if you experience any of the following:  persistent dizziness, light-headedness, or visual disturbances     Notify your health care provider if you experience any of the following:  increased confusion or weakness     Activity as tolerated         Pending Diagnostic Studies:     None         Medications:  Reconciled Home Medications:      Medication List      START taking these medications    acetaminophen 325 MG tablet  Commonly known as:  TYLENOL  Take 2 tablets (650 mg total) by mouth every 4 (four) hours as needed for Pain or Temperature greater than (100).     amoxicillin-clavulanate 875-125mg 875-125 mg per tablet  Commonly known as:  AUGMENTIN  Take 1 tablet by mouth 2 (two) times daily. for 5 days        CONTINUE taking these medications    omega 3-dha-epa-fish oil 1,000 mg (120 mg-180 mg) Cap  Take 1 capsule by mouth once daily.     omeprazole 20 mg Tbec  1/2 tablet daily in am as needed.     pravastatin 10 MG tablet  Commonly known as:  PRAVACHOL  Take 1 tablet (10 mg  total) by mouth once daily.     VITAMIN C 1000 MG tablet  Generic drug:  ascorbic acid (vitamin C)  Take 1,000 mg by mouth once daily.            Indwelling Lines/Drains at time of discharge:   Lines/Drains/Airways          None          Time spent on the discharge of patient: >30 minutes  Patient was seen and examined on the date of discharge and determined to be suitable for discharge.         Julien Paul MD  Department of Hospital Medicine  [facility]

## 2018-10-17 NOTE — NURSING
Patient discharged per MD order. IV removed; catheter tip intact. No distress noted. Discharge instructions explained; patient verbalized understanding. VSS. Afebrile. No complaint of pain, n/v, diarrhea, or SOB. RX info provided. Questions encouraged and answered. Patient refused wheelchair, left with belongings to Main Entrance with spouse.

## 2018-10-17 NOTE — SUBJECTIVE & OBJECTIVE
Interval History-     No acute events overnight. Breathing at baseline. Still with intermittent cough. No hemoptysis. Wants to go home.       Past medical history/surgical history reviewed     Review of patient's allergies indicates:  No Known Allergies    Family History     Problem Relation (Age of Onset)    Colon cancer Mother    Dementia Brother    Diabetes Mother, Sister, , Brother, Brother, Maternal Aunt    Heart disease Father    Lung cancer Brother    Mental illness Sister    Myasthenia gravis Brother    Other Brother    Parkinsonism Brother        Tobacco Use    Smoking status: Former Smoker     Packs/day: 0.25     Years: 5.00     Pack years: 1.25     Last attempt to quit: 10/2/1962     Years since quittin.0    Smokeless tobacco: Never Used    Tobacco comment: quit age 23   Substance and Sexual Activity    Alcohol use: Yes     Comment: beer occasionally    Drug use: No    Sexual activity: Yes     Partners: Female         Review of Systems   Constitutional: Positive for appetite change, chills and fever. Negative for activity change, diaphoresis, fatigue and unexpected weight change.   HENT: Negative for congestion, ear discharge, ear pain, hearing loss, rhinorrhea, sore throat, trouble swallowing and voice change.    Eyes: Negative for pain, discharge, redness and visual disturbance.   Respiratory: Positive for chest tightness (resolved ) and shortness of breath (resolved). Negative for wheezing and stridor.    Cardiovascular: Negative for chest pain, palpitations and leg swelling.   Gastrointestinal: Negative for abdominal distention, abdominal pain, blood in stool, nausea and vomiting.   Endocrine: Negative for polydipsia and polyuria.   Genitourinary: Negative for decreased urine volume, difficulty urinating, flank pain and hematuria.   Musculoskeletal: Positive for back pain (resolved ). Negative for arthralgias, joint swelling, myalgias, neck pain and neck stiffness.   Skin: Negative for  pallor, rash and wound.   Neurological: Negative for dizziness, syncope, weakness and headaches.   All other systems reviewed and are negative.    Objective:     Vital Signs (Most Recent):  Temp: 97 °F (36.1 °C) (10/17/18 0729)  Pulse: 63 (10/17/18 0729)  Resp: 18 (10/17/18 0729)  BP: 113/66 (10/17/18 0729)  SpO2: 95 % (10/17/18 0729) Vital Signs (24h Range):  Temp:  [97 °F (36.1 °C)-98 °F (36.7 °C)] 97 °F (36.1 °C)  Pulse:  [49-82] 63  Resp:  [18-20] 18  SpO2:  [95 %-97 %] 95 %  BP: (113-153)/(66-84) 113/66     Weight: 74.6 kg (164 lb 6.4 oz)  Body mass index is 24.28 kg/m².      Intake/Output Summary (Last 24 hours) at 10/17/2018 0925  Last data filed at 10/16/2018 2154  Gross per 24 hour   Intake 300 ml   Output --   Net 300 ml       Physical Exam   Constitutional: He is oriented to person, place, and time. He appears well-developed and well-nourished. No distress.   HENT:   Head: Normocephalic and atraumatic.   Right Ear: External ear normal.   Left Ear: External ear normal.   Nose: Nose normal.   Mouth/Throat: Oropharynx is clear and moist. No oropharyngeal exudate.   Eyes: Conjunctivae and EOM are normal. Pupils are equal, round, and reactive to light. Right eye exhibits no discharge. Left eye exhibits no discharge. No scleral icterus.   Neck: Normal range of motion. Neck supple. No JVD present. No tracheal deviation present. No thyromegaly present.   Cardiovascular: Normal rate, regular rhythm and normal heart sounds. Exam reveals no gallop and no friction rub.   No murmur heard.  Pulmonary/Chest: Effort normal and breath sounds normal. No stridor. No respiratory distress. He has no wheezes. He has no rales. He exhibits no tenderness.   Abdominal: Soft. Bowel sounds are normal. He exhibits no distension and no mass. There is no tenderness. There is no rebound and no guarding.   Musculoskeletal: Normal range of motion. He exhibits no edema, tenderness or deformity.   Lymphadenopathy:     He has no cervical  adenopathy.   Neurological: He is alert and oriented to person, place, and time.   Non focal    Skin: He is not diaphoretic.       Vents:       Lines/Drains/Airways     Peripheral Intravenous Line                 Peripheral IV - Single Lumen 10/15/18 1736 Right Antecubital 1 day                Significant Labs:    CBC/Anemia Profile:  Recent Labs   Lab  10/15/18   1525   WBC  12.60   HGB  14.4   HCT  43.2   PLT  230   MCV  87   RDW  14.2        Chemistries:  Recent Labs   Lab  10/15/18   1525   NA  140   K  4.1   CL  105   CO2  27   BUN  7*   CREATININE  1.0   CALCIUM  9.2   ALBUMIN  3.7   PROT  7.2   BILITOT  0.9   ALKPHOS  73   ALT  23   AST  32     HIV- negative   TB IGRA negative     LA- 2.6   BNP-13       MICRO-     Blood Cultures- NGTD     Gram Stain (Respiratory) <10 epithelial cells per low power field.    Gram Stain (Respiratory) Rare WBC's    Gram Stain (Respiratory) Rare Gram positive cocci    Resulting Agency [facility]      Narrative   Performed by: [facility]   Bronchial Wash     AFB- bronch- pending     Fungal culture BAL- pending      AFB Culture & Smear Culture positive, identification pending P   AFB Culture & Smear Results called to and read back by: Twyla Larry  10/10/2018  15:30  P   AFB Culture & Smear for Dr Gurrola P   AFB CULTURE STAIN No acid fast bacilli seen.        Cytology- pending     Significant Imaging:   CT: I have reviewed all pertinent results/findings within the past 24 hours and my personal findings are:  1. No evidence of aortic aneurysm or dissection.     1. No evidence of aortic aneurysm or dissection.  2. Known right upper lobe neoplastic versus infectious cavitary lesion.  3. Moderate pulmonary emphysema with mild diffuse ground-glass attenuation which could reflect superimposed edema versus other diffuse infectious or inflammatory process.  4. 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  5. Additional findings as detailed above    CT Chest 9/2018- RUL  cavitary lesion       PFTs:     Pre FEV1 2.04 - 3.76 L 3.41    Pre FEV1 FVC 61.07 - 89.60 % 82.28    Pre FVC 2.83 - 4.92 L 4.15    Pre PEF 5.23 - 9.71 L/s 6.91    Pre FEF 25 75 0.85 - 3.39 L/s 2.68    Pre  sec 3.50    Pre DLCO 18.14 - 32.00 ml/(min*mmHg) 39.22 Abnormally high

## 2018-10-17 NOTE — ASSESSMENT & PLAN NOTE
79 y/o male with recently identified RUL 4.7cm cavitary lesion of lung. Ongoing sputum production, chronic cough and weight loss over the last year. Underwent diagnostic bronchoscopy one day prior to admission and results still pending. Post bronchoscopy developed fever and rigors with marginal BP and elevated LA on ED presentation. Suspect that his SIRS response related to inflammatory reaction post bronchoscopy.. He has clinically improved. Afebrile. All cultures negative to date. Rapid grower AFB from 10/3.. Not yet identified. AFB smear on bronchoscopy negative.     -- OK to discharge from my standpoint. Augmentin x 5 days   -- await bronchoscopy data  -- Follow up with Burak Gurrola in Pulmonary clinic as scheduled.

## 2018-10-18 ENCOUNTER — PATIENT MESSAGE (OUTPATIENT)
Dept: FAMILY MEDICINE | Facility: CLINIC | Age: 78
End: 2018-10-18

## 2018-10-19 ENCOUNTER — PATIENT OUTREACH (OUTPATIENT)
Dept: ADMINISTRATIVE | Facility: CLINIC | Age: 78
End: 2018-10-19

## 2018-10-19 NOTE — PATIENT INSTRUCTIONS
Treating Pneumonia  Pneumonia is an infection of one or both of the lungs. Pneumonia:  · Is usually caused by either a virus or a bacteria  · Can be very serious, especially in infants, young children, and older adults. Its also serious for those with other long-term health problems or weakened immune systems.  · Is sometimes treated at home and sometimes in the hospital    Antibiotic medicines  Antibiotics may be prescribed for pneumonia caused by bacteria. They may be pills (oral medicines), or shots (injections). Or they may be given by IV (intravenously) into a vein. If you are taking oral medicines at home:  · Fill your prescription and start taking your medicine as soon as you can.  · You will likely start to feel better in a day or 2, but dont stop taking the antibiotic.  · Use a pill organizer to help you remember to take your medicine.  · Let your healthcare provider know if you have side effects.  · Take your medicine exactly as directed on the label. Talk to your provider or pharmacist if you have any questions.  Antiviral medicines  Antiviral medicine may be prescribed for pneumonia caused by a virus. For example, antiviral medicine may be prescribed for pneumonia caused by the flu virus. Antibiotics do not work against viruses. If you are taking antiviral medicine at home:  · Fill your prescription and start taking your medicine as soon as you can.  · Talk with your provider or pharmacist about possible side effects.  · Take the medicine exactly as instructed.  To relieve symptoms  There are many medicines that can help relieve symptoms of pneumonia. Some are prescription and some are over-the-counter.  Your healthcare provider may recommend:  · Acetaminophen or ibuprofen to lower your fever and to lessen headache or other pain  · Cough medicine to loosen mucus or to reduce coughing  Make sure you check with your healthcare provider or pharmacist before taking any over-the-counter medicines.  Special  treatments  If you are hospitalized for pneumonia, you may have other therapies, including:  · Inhaled medicines to help with breathing or chest congestion  · Supplemental oxygen to increase low oxygen levels  Drink fluids and eat healthy  You should eat healthy to help your body fight the infection. Drinking a lot of fluids helps to replace fluids lost from fever and to loosen mucus in your chest.  · Diet. Make healthy food choices, including fruits and vegetables, lean meats and other proteins, 100% whole grain and low- or no-fat dairy products.  · Fluids. Drink at least 6 to 8 tall glasses a day. Water and 100% fruit or vegetable juice are best.  Get plenty of rest and sleep  You may be more tired than usual for a while. It is important to get enough sleep at night. Its also important to rest during the day. Talk with your healthcare provider if coughing or other symptoms are interfering with your sleep.  Preventing the spread of germs  The best thing you can do to prevent spreading germs is to wash your hands often. You should:  · Rub your hand with soap and water for 20 to 30 seconds.  · Clean in between your fingers, the backs of your hands, and around your nails.  · Dry your hands on a separate towel or use paper towels.  You should also:  · Keep alcohol-based hand  nearby.  · Make sure you also clean surfaces that you touch. Use a product that kills all types of germs.  · Stay away from others until you are feeling better.  When to call your healthcare provider  Call your healthcare provider if you have any of the following:  · Symptoms get worse  · Fever continues  · Shortness of breath gets worse  · Increased mucus or mucus that is darker in color  · Coughing gets worse  · Lips or fingers are bluish in color  · Side effects from your medicine   Date Last Reviewed: 12/1/2016  © 0829-8284 [employer]. [address] [place] [address], [place] [address]. All rights reserved. This information is  not intended as a substitute for professional medical care. Always follow your healthcare professional's instructions.

## 2018-10-20 LAB
BACTERIA BLD CULT: NORMAL
BACTERIA BLD CULT: NORMAL

## 2018-10-23 ENCOUNTER — OFFICE VISIT (OUTPATIENT)
Dept: FAMILY MEDICINE | Facility: CLINIC | Age: 78
End: 2018-10-23
Payer: MEDICARE

## 2018-10-23 VITALS
BODY MASS INDEX: 22.79 KG/M2 | OXYGEN SATURATION: 96 % | HEART RATE: 61 BPM | DIASTOLIC BLOOD PRESSURE: 68 MMHG | HEIGHT: 71 IN | TEMPERATURE: 98 F | SYSTOLIC BLOOD PRESSURE: 104 MMHG | WEIGHT: 162.81 LBS

## 2018-10-23 DIAGNOSIS — Z09 HOSPITAL DISCHARGE FOLLOW-UP: Primary | ICD-10-CM

## 2018-10-23 DIAGNOSIS — I70.0 ATHEROSCLEROSIS OF AORTA: ICD-10-CM

## 2018-10-23 DIAGNOSIS — E78.5 HYPERLIPIDEMIA LDL GOAL <100: ICD-10-CM

## 2018-10-23 DIAGNOSIS — R93.89 ABNORMAL CT OF THE CHEST: ICD-10-CM

## 2018-10-23 DIAGNOSIS — I10 ESSENTIAL HYPERTENSION: ICD-10-CM

## 2018-10-23 PROCEDURE — 99999 PR PBB SHADOW E&M-EST. PATIENT-LVL IV: CPT | Mod: PBBFAC,,, | Performed by: NURSE PRACTITIONER

## 2018-10-23 PROCEDURE — 99214 OFFICE O/P EST MOD 30 MIN: CPT | Mod: PBBFAC,PO | Performed by: NURSE PRACTITIONER

## 2018-10-23 PROCEDURE — 99495 TRANSJ CARE MGMT MOD F2F 14D: CPT | Mod: PBBFAC,PO | Performed by: NURSE PRACTITIONER

## 2018-10-23 PROCEDURE — 99495 TRANSJ CARE MGMT MOD F2F 14D: CPT | Mod: S$PBB,,, | Performed by: NURSE PRACTITIONER

## 2018-10-23 PROCEDURE — 99499 UNLISTED E&M SERVICE: CPT | Mod: S$GLB,,, | Performed by: NURSE PRACTITIONER

## 2018-10-23 NOTE — PROGRESS NOTES
Transitional Care Note  Subjective:       Patient ID: Diego Gunter is a 78 y.o. male.  Chief Complaint: Transitional Care    Family and/or Caretaker present at visit?  Yes.  Diagnostic tests reviewed/disposition: I have reviewed all completed as well as pending diagnostic tests at the time of discharge.  Disease/illness education: none  Home health/community services discussion/referrals: Patient does not have home health established from hospital visit.  They do not need home health.  If needed, we will set up home health for the patient.   Establishment or re-establishment of referral orders for community resources: No other necessary community resources.   Discussion with other health care providers: No discussion with other health care providers necessary.   He was admitted to [facility] from 10/15/2018 through 10/17/ 2018.  He presented to [facility] Emergency Department complaining of shortness of breath.  Patient has a known hepatorenal lesion and underwent bronchoscopy at [facility] earlier on the day he presented to the emergency room.  After arriving home he had increased work of breathing, shortness of breath, and pain that radiated to his back between the shoulder blades.  Subacute onset after the procedure.  A call was placed to his pulmonologist who recommended he come to the emergency room for further evaluation.  Associated with fever and cough.  He denied any hemoptysis.  His shortness of breath worsened with exertion.  He had minimal relief with rest and supplemental oxygen given in the emergency department.  The pain radiated throughout his chest but primarily between his shoulder blades.  In the emergency department routine laboratory studies and chest x-ray were obtained.  This was followed by CT angiogram of the chest which demonstrated some of the previous abnormalities that were known.  Patient was started on empiric antibiotics including ceftriaxone and  doxycycline.  Supplemental oxygen was provided.  He had evidence of a fever.  Cultures were obtained prior to antibiotics being given.  Pulmonary will be can consulted.  [facility] Medicine has been asked to admit for further evaluation and treatment.  Patient with persistent productive cough x1 year plus dark brown phlegm.  He underwent CT of chest by PCP which will revealed right upper lobe cavitary lesion.  Patient is here for pulmonary evaluation. He had a  weight loss of 30 lb since 2016.  He underwent FOBT yesterday morning at [facility] with BAL and or RUL.  No issues immediately following procedure.  Around noon he would eat lunch began with ongoing chills.  He did not improve with conservative measures at home and presented to [facility] Emergency Room Department.  He was  found to be febrile to 101 and hypoxic.  Admitted to the Hospital Medicine and treated with antibiotics and supplemental oxygen.  Patient quickly improved and was able to be taken off oxygen.  Presents placed on room-air.  No additional febrile episodes.  Hemodynamically stable.  Breathing much improved.  Pulmonary consulted.  Possible post  bronch pneumonitis causing symptoms and not pneumonia.  Pneumonia has been ruled out the patient will be given 5 more of Augmentin on discharge. He will be discharged home to follow up with Dr. Gurrola.  He is scheduled to follow up with Dr. Gurrola on October 26, 2018. He states he completed his Augmentin yesterday.  He states he feels much better now.  However he does tire easily.  He has been walking about a mile each day and does get short of breath at the end of the mile.  He denies any weakness.  He has a normal appetite.  He denies any nausea vomiting or diarrhea.  He denies any fever.  He is still coughing up some phlegm that he states is light brown but no blood.      Review of Systems   Constitutional: Positive for fatigue. Negative for chills and fever.   HENT: Negative for congestion, ear  discharge, ear pain, rhinorrhea, sinus pressure, sinus pain and sore throat.    Eyes: Negative for pain, discharge and itching.   Respiratory: Positive for cough. Negative for shortness of breath and wheezing.    Cardiovascular: Negative for chest pain, palpitations and leg swelling.   Gastrointestinal: Negative for abdominal pain, diarrhea, nausea and vomiting.   Musculoskeletal: Negative for gait problem.   Neurological: Negative for dizziness, light-headedness and headaches.       Objective:      Physical Exam   Constitutional: He is oriented to person, place, and time. He appears well-developed and well-nourished. No distress.   Eyes: Conjunctivae and EOM are normal. Pupils are equal, round, and reactive to light. Right eye exhibits no discharge. Left eye exhibits no discharge. No scleral icterus.   Cardiovascular: Normal rate, regular rhythm and normal heart sounds. Exam reveals no gallop and no friction rub.   No murmur heard.  Pulmonary/Chest: Effort normal and breath sounds normal. No stridor. No respiratory distress. He has no wheezes.   Abdominal: Soft. Bowel sounds are normal. There is no tenderness.   Musculoskeletal: Normal range of motion. He exhibits no edema.   Neurological: He is alert and oriented to person, place, and time.   Skin: Skin is warm and dry. He is not diaphoretic.   Psychiatric: He has a normal mood and affect.       Assessment:       1. Hospital discharge follow-up    2. Abnormal CT of the chest    3. Atherosclerosis of aorta    4. Hyperlipidemia LDL goal <100    5. Essential hypertension        Plan:         Hospital discharge follow-up  - Follow up with Dr. Gurrola on 10/26/2018 as scheduled    Abnormal CT of the chest  - follow up with Dr. Gurrola to discuss results and any further testing     Atherosclerosis of aorta  - Stable / Asymptomatic is on  cholesterol lowering medication and blood pressure monitoring     Hyperlipidemia LDL goal <100  - The current medical regimen is effective;   continue present plan and medications.    Essential hypertension  - on no blood pressure medications at this time

## 2018-10-23 NOTE — PATIENT INSTRUCTIONS
Continue all current medications   Follow up with Dr. Gurrola on 10/26/2018 as scheduled   Follow up Dr. Ferreira in 3/2018 for check up

## 2018-10-23 NOTE — PROGRESS NOTES
Subjective:       Patient ID: Diego Gunter is a 78 y.o. male.    Chief Complaint: No chief complaint on file.    HPI  Past Medical History:   Diagnosis Date    Dysphagia     Hx of colonic polyps     followed by GI. Dr. Pham    Hyperlipidemia LDL goal <100     Overweight(278.02)     Prostate cancer september 2011    followed by urology, Dr. Rogers    Type II or unspecified type diabetes mellitus without mention of complication, not stated as uncontrolled     diet controlled     Past Surgical History:   Procedure Laterality Date    BRONCHOSCOPY N/A 10/15/2018    Procedure: BRONCHOSCOPY;  Surgeon: [facility] Diagnostic Provider;  Location: [facility] OR [address] [place] [address];  Service: Anesthesiology;  Laterality: N/A;    BRONCHOSCOPY N/A 10/15/2018    Performed by [facility] Diagnostic Provider at [facility] OR [address] [place] [address]    CATARACT EXTRACTION, BILATERAL  2014    ESOPHAGOGASTRODUODENOSCOPY (EGD) N/A 2/25/2016    Performed by Thomas Mon[facility]o MD at [facility] ENDO    PROSTATECTOMY        reports that he quit smoking about 56 years ago. He has a 1.25 pack-year smoking history. he has never used smokeless tobacco. He reports that he drinks alcohol. He reports that he does not use drugs.  Review of Systems    Objective:      Physical Exam    Assessment:       No diagnosis found.    Plan:       ***    No Follow-up on file.

## 2018-10-26 ENCOUNTER — OFFICE VISIT (OUTPATIENT)
Dept: SLEEP MEDICINE | Facility: CLINIC | Age: 78
End: 2018-10-26
Payer: MEDICARE

## 2018-10-26 VITALS
BODY MASS INDEX: 24.56 KG/M2 | DIASTOLIC BLOOD PRESSURE: 58 MMHG | OXYGEN SATURATION: 98 % | SYSTOLIC BLOOD PRESSURE: 100 MMHG | HEIGHT: 69 IN | WEIGHT: 165.81 LBS | HEART RATE: 54 BPM

## 2018-10-26 DIAGNOSIS — J98.4 CAVITARY LESION OF LUNG: ICD-10-CM

## 2018-10-26 DIAGNOSIS — J43.2 CENTRILOBULAR EMPHYSEMA: ICD-10-CM

## 2018-10-26 DIAGNOSIS — R04.2 HEMOPTYSIS: ICD-10-CM

## 2018-10-26 DIAGNOSIS — A31.0 MAI (MYCOBACTERIUM AVIUM-INTRACELLULARE): Primary | ICD-10-CM

## 2018-10-26 DIAGNOSIS — R91.1 LUNG NODULE: ICD-10-CM

## 2018-10-26 PROCEDURE — 3074F SYST BP LT 130 MM HG: CPT | Mod: CPTII,,, | Performed by: INTERNAL MEDICINE

## 2018-10-26 PROCEDURE — 99999 PR PBB SHADOW E&M-EST. PATIENT-LVL III: CPT | Mod: PBBFAC,,, | Performed by: INTERNAL MEDICINE

## 2018-10-26 PROCEDURE — 99213 OFFICE O/P EST LOW 20 MIN: CPT | Mod: PBBFAC,PO | Performed by: INTERNAL MEDICINE

## 2018-10-26 PROCEDURE — 3078F DIAST BP <80 MM HG: CPT | Mod: CPTII,,, | Performed by: INTERNAL MEDICINE

## 2018-10-26 PROCEDURE — 1101F PT FALLS ASSESS-DOCD LE1/YR: CPT | Mod: CPTII,,, | Performed by: INTERNAL MEDICINE

## 2018-10-26 PROCEDURE — 99214 OFFICE O/P EST MOD 30 MIN: CPT | Mod: S$PBB,,, | Performed by: INTERNAL MEDICINE

## 2018-10-26 NOTE — PROGRESS NOTES
Diego Gunter  was seen as a follow up.    CHIEF COMPLAINT:  Cavitary lesion of lung (4 week f/u)      HISTORY OF PRESENT ILLNESS: Diego Gunter is a 78 y.o. male  has a past medical history of Dysphagia, colonic polyps, Hyperlipidemia LDL goal <100, Overweight(278.02), Prostate cancer (september 2011), and Type II or unspecified type diabetes mellitus without mention of complication, not stated as uncontrolled.  Patient was diagnosed with prostate in 2011 s/p prostatectomy.  Patient with remote smoking history and quit in 1962.  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      Patient with persistent productive cough x 1 year.  +dark brown phlegm.  No hemoptysis.  Patient underwent CT of chest by pcp which revealed rul cavitary lesion.  Patient is here for pulmonary evaluation.  +weight loss 30 lbs since 2016.      Patient routinely exercise with treadmill, ellipitical 4 times per week for 1 hour session without issue.      S/p fob 10/15/18 with bal.  After fob, patient developed fever,sob and confusion.  Patient was admitted with doxy and rocephin.  Patient was discharge with augmentin.  Patient is here for follow up.  No hemoptysis since 10/6/18.  No ugzman with adl.      PAST MEDICAL HISTORY:    Active Ambulatory Problems     Diagnosis Date Noted    History of prostate cancer 09/01/2011    Hx of colonic polyps     Type 2 diabetes, controlled, with neuropathy     Allergic rhinitis 07/07/2014    Hypertension 07/07/2014    Anxiety state, unspecified 07/14/2014    Hyperlipidemia LDL goal <100     Dysphagia, oropharyngeal 06/17/2016    Atherosclerosis of aorta 09/27/2018    Centrilobular emphysema 09/27/2018    Cavitary lesion of lung 10/15/2018    Abnormal CT of the chest 10/16/2018    HILLARY (mycobacterium avium-intracellulare) 10/26/2018    Hemoptysis 10/26/2018     Resolved Ambulatory Problems     Diagnosis Date Noted    Overweight (BMI 25.0-29.9)     Occipital  neuralgia 2014    Headache(784.0) 2014    Dog scratch 2015    Cellulitis of arm, right 2015    Pneumonia of both lungs due to infectious organism 10/15/2018     Past Medical History:   Diagnosis Date    Dysphagia     Hx of colonic polyps     Hyperlipidemia LDL goal <100     Overweight(278.02)     Prostate cancer 2011    Type II or unspecified type diabetes mellitus without mention of complication, not stated as uncontrolled                 PAST SURGICAL HISTORY:    Past Surgical History:   Procedure Laterality Date    BRONCHOSCOPY N/A 10/15/2018    Procedure: BRONCHOSCOPY;  Surgeon: [facility] Diagnostic Provider;  Location: [facility] OR [address] [place] [address];  Service: Anesthesiology;  Laterality: N/A;    BRONCHOSCOPY N/A 10/15/2018    Performed by [facility] Diagnostic Provider at [facility] OR [address] [place] [address]    CATARACT EXTRACTION, BILATERAL      ESOPHAGOGASTRODUODENOSCOPY (EGD) N/A 2016    Performed by [facility] ENDO    PROSTATECTOMY           FAMILY HISTORY:                Family History   Problem Relation Age of Onset    Colon cancer Mother     Diabetes Mother     Heart disease Father     Mental illness Sister     Diabetes Sister     Other Brother         polio as a child    Diabetes Unknown     Diabetes Brother     Myasthenia gravis Brother     Parkinsonism Brother     Diabetes Brother     Dementia Brother     Lung cancer Brother         smoker    Diabetes Maternal Aunt        SOCIAL HISTORY:          Tobacco:   Social History     Tobacco Use   Smoking Status Former Smoker    Packs/day: 0.25    Years: 5.00    Pack years: 1.25    Last attempt to quit: 10/2/1962    Years since quittin.1   Smokeless Tobacco Never Used   Tobacco Comment    quit age 23     alcohol use:    Social History     Substance and Sexual Activity   Alcohol Use Yes    Comment: beer occasionally               Occupation:  Former shipyard worker    ALLERGIES:  Review of patient's  "allergies indicates:  No Known Allergies    CURRENT MEDICATIONS:    Current Outpatient Medications   Medication Sig Dispense Refill    ascorbic acid (VITAMIN C) 1000 MG tablet Take 1,000 mg by mouth once daily.        omega 3-dha-epa-fish oil 1,000 (120-180) mg Cap Take 1 capsule by mouth once daily.        omeprazole 20 mg TbEC 1/2 tablet daily in am as needed. 45 each 0    pravastatin (PRAVACHOL) 10 MG tablet Take 1 tablet (10 mg total) by mouth once daily. 90 tablet 1     No current facility-administered medications for this visit.                   REVIEW OF SYSTEMS:     Pulmonary related symptoms as per HPI.  Gen:  no weight loss, no fever, no night sweat  HEENT:  no visual changes, no sore throat, no hearing loss  CV:  No chest pain, no orthopnea, no PND  GI:  no melena, no hematochezia, no diarhea, no constipation.  H/o dysphagia  :  no dysuria, no hematuria, no hesistancy, no dribbling  Neuro:  no syncope, no vertigo, no tinitus  Psych:  No homocide or suicide ideation; no depression.  Endocrine:  No heat or cold intolerance.  Sleep:  No snoring; no witnessed apnea.  Otherwise, a balance of systems reviewed is negative.          PHYSICAL EXAM:  Vitals:    10/26/18 0906   BP: (!) 100/58   Pulse: (!) 54   SpO2: 98%   Weight: 75.2 kg (165 lb 12.6 oz)   Height: 5' 9" (1.753 m)   PainSc:   2   PainLoc: Back     Body mass index is 24.48 kg/m².     GENERAL:  well develop; no apparent distress  HEENT:  no nasal congestion; no discharge noted; class 3 modified mallampatti.   NECK:  supple; no palpable masses.  CARDIO: regular rate and rhythm  PULM:  clear to auscultation bilaterally; no intercostals retractions; no accessory muscle usage   ABDOMEN:  soft nontender/nondistended.  +bowel sound  EXTREMITIES no cce  NEURO:  CN II-XII intact.  5/5 motor in all extremities.  sensation grossly intact   to light touch.  PSYCH:  normal affect.  Alert and oriented x 4    LABS  Pulmonary Functions Testing Results: 10/8/18 " unable to produce acceptable effort.    ABG none  CXR:  8/5/15 no effusion or consoliation  CT CHEST:  9/21/18 bilateral emphysematous changes.  RUL cavitary lesion (new when compared to 2016 ct of neck)  cta 10/15/18 persistent rul cavitary lesion    ASSESSMENT  Problem List Items Addressed This Visit     Cavitary lesion of lung    Current Assessment & Plan     rul cavitary lesion s/p fob.  Cytology from fob is negative for malignancy.  Sputum 10/3/18 with hillary.  Await bal culture.  May consider ct guided biopsy if bal culture is non-diagnostic.  Will repeat ct in 3 months.         Centrilobular emphysema    Overview     - noted on CT of chest 9/2018         Current Assessment & Plan     Emphysematous changes noted on ct.  pft was not valid due to poor effort.  Clinically asymptomatic.           Hemoptysis    Current Assessment & Plan     Resolved.           HILLARY (mycobacterium avium-intracellulare) - Primary    Current Assessment & Plan     Rapid growth from sputum 10/3/18.  Bal culture pending.  Will get opinion from ID.           Relevant Orders    Ambulatory consult to Infectious Disease      Other Visit Diagnoses     Lung nodule        Relevant Orders    CT Chest Without Contrast         Patient will No Follow-up on file. with md.

## 2018-10-26 NOTE — ASSESSMENT & PLAN NOTE
Emphysematous changes noted on ct.  pft was not valid due to poor effort.  Clinically asymptomatic.

## 2018-10-26 NOTE — ASSESSMENT & PLAN NOTE
rul cavitary lesion s/p fob.  Cytology from fob is negative for malignancy.  Sputum 10/3/18 with brie.  Await bal culture.  May consider ct guided biopsy if bal culture is non-diagnostic.  Will repeat ct in 3 months.

## 2018-11-05 ENCOUNTER — OFFICE VISIT (OUTPATIENT)
Dept: INFECTIOUS DISEASES | Facility: CLINIC | Age: 78
End: 2018-11-05
Payer: MEDICARE

## 2018-11-05 VITALS
SYSTOLIC BLOOD PRESSURE: 96 MMHG | BODY MASS INDEX: 24.52 KG/M2 | HEART RATE: 66 BPM | DIASTOLIC BLOOD PRESSURE: 62 MMHG | WEIGHT: 165.56 LBS | TEMPERATURE: 98 F | HEIGHT: 69 IN

## 2018-11-05 DIAGNOSIS — A31.0 MAI (MYCOBACTERIUM AVIUM-INTRACELLULARE): Primary | ICD-10-CM

## 2018-11-05 PROCEDURE — 1100F PTFALLS ASSESS-DOCD GE2>/YR: CPT | Mod: CPTII,S$GLB,, | Performed by: INTERNAL MEDICINE

## 2018-11-05 PROCEDURE — 3078F DIAST BP <80 MM HG: CPT | Mod: CPTII,S$GLB,, | Performed by: INTERNAL MEDICINE

## 2018-11-05 PROCEDURE — 99999 PR PBB SHADOW E&M-EST. PATIENT-LVL III: CPT | Mod: PBBFAC,,, | Performed by: INTERNAL MEDICINE

## 2018-11-05 PROCEDURE — 99205 OFFICE O/P NEW HI 60 MIN: CPT | Mod: S$GLB,,, | Performed by: INTERNAL MEDICINE

## 2018-11-05 PROCEDURE — 3074F SYST BP LT 130 MM HG: CPT | Mod: CPTII,S$GLB,, | Performed by: INTERNAL MEDICINE

## 2018-11-05 PROCEDURE — 99499 UNLISTED E&M SERVICE: CPT | Mod: S$GLB,,, | Performed by: INTERNAL MEDICINE

## 2018-11-05 PROCEDURE — 3288F FALL RISK ASSESSMENT DOCD: CPT | Mod: CPTII,S$GLB,, | Performed by: INTERNAL MEDICINE

## 2018-11-05 NOTE — LETTER
November 5, 2018      Burak Gurrola MD  [address]  [place] [id number]           [facility]  [address]  [place] [address]  Phone: [phone]  Fax: [phone]          Patient: Diego Gunter   MR Number: [id number]   YOB: 1940   Date of Visit: 11/5/2018       Dear Dr. Burak Gurrola:    Thank you for referring Diego Gunter to me for evaluation. Attached you will find relevant portions of my assessment and plan of care.    If you have questions, please do not hesitate to call me. I look forward to following Diego Gunter along with you.    Sincerely,    Theo Alvares MD    Enclosure  CC:  No Recipients    If you would like to receive this communication electronically, please contact [email or web address] or [phone] to request more information on [employer] Link access.    For providers and/or their staff who would like to refer a patient to [facility], please contact us through our one-stop-shop provider referral line, [facility], at [phone].    If you feel you have received this communication in error or would no longer like to receive these types of communications, please e-mail [email or web address]

## 2018-11-05 NOTE — PROGRESS NOTES
Subjective:      Patient ID: Diego Gunter is a 78 y.o. male.    Chief Complaint:  Consult from Dr. 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     About 6 months ago developed cough productive of yellow phlegm which was briefly bloody, likely associated with a pulmonary infection, but the blood cleared. He has no fever. He is able to get on stair climber for 30 min without much problem. Tried to do PFTs but effort was insufficient to get adequate reading. He says he feels well and is only bothered by the productive cough and dyspnea.    He saw Dr. Gurrola in pulmonary who worked up the cavitary lesion in RUL.  CT done  9/27/18 and 10/15/18.  There are  abnormal opacities that require further evaluation.  The largest opacity in the right lung appears cavitary and measures 4.7 cm on series 2, image 43.  Lesion is located in posterior segment of right upper lobe with possibilities including lung cancer or infection such as mycobacterial or fungal disease .  The largest opacity in the left lung appears solid and measures 0.3 cm on series 2, image 52; this is in a cluster of micro nodules suggesting small airway infection/inflammation  .  The lungs show moderate findings consistent with emphysema.    Bronch done 10/15/18 and BAL is growing MAC. MAC also isolated from sputum of 10/3/18. No susceptibility reports available yet.    CBC and CMP are normal. Pulmonary cytologies and GMS stains are negative.    Review of Systems   Constitution: Positive for malaise/fatigue. Negative for chills, decreased appetite, fever, weakness, night sweats, weight gain and weight loss.   HENT: Positive for hoarse voice. Negative for congestion, ear pain, hearing loss, sore throat and tinnitus.    Eyes: Negative for blurred vision, redness and visual disturbance.    Cardiovascular: Negative for chest pain, leg swelling and palpitations.   Respiratory: Positive for cough, hemoptysis, shortness of breath and wheezing. Negative for sputum production.    Hematologic/Lymphatic: Negative for adenopathy. Does not bruise/bleed easily.   Skin: Negative for dry skin, itching, rash and suspicious lesions.   Musculoskeletal: Negative for back pain, joint pain, myalgias and neck pain.   Gastrointestinal: Negative for abdominal pain, constipation, diarrhea, heartburn, nausea and vomiting.   Genitourinary: Negative for dysuria, flank pain, frequency, hematuria, hesitancy and urgency.   Neurological: Negative for dizziness, headaches, numbness and paresthesias.   Psychiatric/Behavioral: Negative for depression and memory loss. The patient does not have insomnia and is not nervous/anxious.      Objective:   Physical Exam   Constitutional: He is oriented to person, place, and time. He appears well-developed and well-nourished.   Eyes: Conjunctivae and EOM are normal. Pupils are equal, round, and reactive to light.   Neck: Normal range of motion. Neck supple. No thyromegaly present.   Cardiovascular: Normal rate, regular rhythm and normal heart sounds.   No murmur heard.  Pulmonary/Chest: Effort normal and breath sounds normal. He has no wheezes. He has no rales.   Abdominal: Soft. Bowel sounds are normal. He exhibits no mass. There is no tenderness. There is no rebound.   Musculoskeletal: Normal range of motion.   Lymphadenopathy:     He has no cervical adenopathy.   Neurological: He is alert and oriented to person, place, and time.   Skin: Skin is warm and dry.   Psychiatric: He has a normal mood and affect. His behavior is normal.   Vitals reviewed.    Assessment:       1. 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 COPD    Plan:        1. dont think there is indication for treatment now. He is scheduled for a f/u CT scan on Jan 7. Relatively few systemic symptoms.    2. Await susceptibility tests on MAC   3. See back after CT in [place]

## 2018-11-12 ENCOUNTER — PATIENT MESSAGE (OUTPATIENT)
Dept: PULMONOLOGY | Facility: CLINIC | Age: 78
End: 2018-11-12

## 2018-12-02 ENCOUNTER — PATIENT MESSAGE (OUTPATIENT)
Dept: INFECTIOUS DISEASES | Facility: CLINIC | Age: 78
End: 2018-12-02

## 2018-12-06 LAB
ACID FAST SUSCEPTIBILITY, SLOW GROWER: ABNORMAL
SPECIMEN SOURCE AND ORGANISM NAME: ABNORMAL

## 2018-12-11 LAB
ACID FAST MOD KINY STN SPEC: NORMAL
MYCOBACTERIUM SPEC QL CULT: NORMAL

## 2019-01-07 ENCOUNTER — HOSPITAL ENCOUNTER (OUTPATIENT)
Dept: RADIOLOGY | Facility: HOSPITAL | Age: 79
Discharge: HOME | End: 2019-01-07
Attending: INTERNAL MEDICINE
Payer: MEDICARE

## 2019-01-07 DIAGNOSIS — R91.1 LUNG NODULE: ICD-10-CM

## 2019-01-07 PROCEDURE — 71250 CT THORAX DX C-: CPT | Mod: TC

## 2019-01-07 PROCEDURE — 71250 CT THORAX DX C-: CPT | Mod: 26,,, | Performed by: RADIOLOGY

## 2019-01-07 PROCEDURE — 71250 CT CHEST WITHOUT CONTRAST: ICD-10-PCS | Mod: 26,,, | Performed by: RADIOLOGY

## 2019-01-10 ENCOUNTER — TELEPHONE (OUTPATIENT)
Dept: INFECTIOUS DISEASES | Facility: CLINIC | Age: 79
End: 2019-01-10

## 2019-01-10 NOTE — TELEPHONE ENCOUNTER
----- Message from Angelina Laureano MA sent at 1/10/2019 10:58 AM CST -----  Contact: Diego    tel:   [phone]  Patient called for his CT test results and for what's the next step in his care.     ----- Message -----  From: Beverly Doyle  Sent: 1/10/2019  10:40 AM  To: Angelina Laureano MA    Needs Advice/'s pt.     Reason for call:  Has questions about the cat scan results of yesterday.          Communication Preference:  Phone     Additional Information:  What is the next plan of care.   Pls call to discuss this.

## 2019-01-10 NOTE — TELEPHONE ENCOUNTER
----- Message from Angelina Laureano MA sent at 1/10/2019 10:58 AM CST -----  Contact: Diego    tel:   [phone]  Patient called for his CT test results and for what's the next step in his care.     ----- Message -----  From: Beverly Doyle  Sent: 1/10/2019  10:40 AM  To: Angelina Laureano MA    Needs Advice/'s pt.     Reason for call:  Has questions about the cat scan results of yesterday.          Communication Preference:  Phone     Additional Information:  What is the next plan of care.   Pls call to discuss this.     Called pt back 1/10 at 4:05pm. Left message that CT is stable. If sx unchanged, repeat CT in 12 mos. If sx change, call back for reevaluation

## 2019-01-15 ENCOUNTER — TELEPHONE (OUTPATIENT)
Dept: PULMONOLOGY | Facility: CLINIC | Age: 79
End: 2019-01-15

## 2019-01-15 DIAGNOSIS — J98.4 PULMONARY CAVITARY LESION: Primary | ICD-10-CM

## 2019-01-15 DIAGNOSIS — R91.1 LUNG NODULE: ICD-10-CM

## 2019-01-16 NOTE — TELEPHONE ENCOUNTER
Please advise patient that the cavitary lesion on his right lung is unchanged when compared to prior CT on 9/27/18 and 10/15/18.  I am recommending a repeat ct in 6 months (6/19).

## 2019-01-30 ENCOUNTER — OFFICE VISIT (OUTPATIENT)
Dept: INFECTIOUS DISEASES | Facility: CLINIC | Age: 79
End: 2019-01-30
Payer: MEDICARE

## 2019-01-30 VITALS
DIASTOLIC BLOOD PRESSURE: 68 MMHG | HEIGHT: 69 IN | SYSTOLIC BLOOD PRESSURE: 115 MMHG | WEIGHT: 171.31 LBS | HEART RATE: 59 BPM | BODY MASS INDEX: 25.37 KG/M2

## 2019-01-30 DIAGNOSIS — A31.0 MAI (MYCOBACTERIUM AVIUM-INTRACELLULARE): Primary | ICD-10-CM

## 2019-01-30 DIAGNOSIS — R13.12 DYSPHAGIA, OROPHARYNGEAL: ICD-10-CM

## 2019-01-30 DIAGNOSIS — J43.2 CENTRILOBULAR EMPHYSEMA: ICD-10-CM

## 2019-01-30 DIAGNOSIS — F41.9 ANXIETY: ICD-10-CM

## 2019-01-30 PROBLEM — R04.2 HEMOPTYSIS: Status: RESOLVED | Noted: 2018-10-26 | Resolved: 2019-01-30

## 2019-01-30 PROCEDURE — 3078F DIAST BP <80 MM HG: CPT | Mod: CPTII,S$GLB,, | Performed by: INTERNAL MEDICINE

## 2019-01-30 PROCEDURE — 3288F PR FALLS RISK ASSESSMENT DOCUMENTED: ICD-10-PCS | Mod: CPTII,S$GLB,, | Performed by: INTERNAL MEDICINE

## 2019-01-30 PROCEDURE — 1100F PTFALLS ASSESS-DOCD GE2>/YR: CPT | Mod: CPTII,S$GLB,, | Performed by: INTERNAL MEDICINE

## 2019-01-30 PROCEDURE — 99499 RISK ADDL DX/OHS AUDIT: ICD-10-PCS | Mod: S$GLB,,, | Performed by: INTERNAL MEDICINE

## 2019-01-30 PROCEDURE — 1100F PR PT FALLS ASSESS DOC 2+ FALLS/FALL W/INJURY/YR: ICD-10-PCS | Mod: CPTII,S$GLB,, | Performed by: INTERNAL MEDICINE

## 2019-01-30 PROCEDURE — 3074F PR MOST RECENT SYSTOLIC BLOOD PRESSURE < 130 MM HG: ICD-10-PCS | Mod: CPTII,S$GLB,, | Performed by: INTERNAL MEDICINE

## 2019-01-30 PROCEDURE — 3288F FALL RISK ASSESSMENT DOCD: CPT | Mod: CPTII,S$GLB,, | Performed by: INTERNAL MEDICINE

## 2019-01-30 PROCEDURE — 3074F SYST BP LT 130 MM HG: CPT | Mod: CPTII,S$GLB,, | Performed by: INTERNAL MEDICINE

## 2019-01-30 PROCEDURE — 99999 PR PBB SHADOW E&M-EST. PATIENT-LVL III: CPT | Mod: PBBFAC,,, | Performed by: INTERNAL MEDICINE

## 2019-01-30 PROCEDURE — 99499 UNLISTED E&M SERVICE: CPT | Mod: S$GLB,,, | Performed by: INTERNAL MEDICINE

## 2019-01-30 PROCEDURE — 99215 PR OFFICE/OUTPT VISIT, EST, LEVL V, 40-54 MIN: ICD-10-PCS | Mod: S$GLB,,, | Performed by: INTERNAL MEDICINE

## 2019-01-30 PROCEDURE — 99999 PR PBB SHADOW E&M-EST. PATIENT-LVL III: ICD-10-PCS | Mod: PBBFAC,,, | Performed by: INTERNAL MEDICINE

## 2019-01-30 PROCEDURE — 99215 OFFICE O/P EST HI 40 MIN: CPT | Mod: S$GLB,,, | Performed by: INTERNAL MEDICINE

## 2019-01-30 PROCEDURE — 3078F PR MOST RECENT DIASTOLIC BLOOD PRESSURE < 80 MM HG: ICD-10-PCS | Mod: CPTII,S$GLB,, | Performed by: INTERNAL MEDICINE

## 2019-01-30 NOTE — PROGRESS NOTES
"Subjective:      Patient ID: Diego Gunter is a 78 y.o. male.    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      About 6 months ago developed cough productive of yellow phlegm which was briefly bloody, likely associated with a pulmonary infection, but the blood cleared. He has no fever. He is able to get on stair climber for 30 min without much problem. Tried to do PFTs but effort was insufficient to get adequate reading. He says he feels well and is only bothered by the productive cough and dyspnea.     He saw Dr. Gurrola in pulmonary who worked up the cavitary lesion in RU.  CT done  9/27/18 and 10/15/18.  There are  abnormal opacities that require further evaluation.  The largest opacity in the right lung appears cavitary and measures 4.7 cm on series 2, image 43.  Lesion is located in posterior segment of right upper lobe with possibilities including lung cancer or infection such as mycobacterial or fungal disease .  The largest opacity in the left lung appears solid and measures 0.3 cm on series 2, image 52; this is in a cluster of micro nodules suggesting small airway infection/inflammation  .  The lungs show moderate findings consistent with emphysema.     Bronch done 10/15/18 and BAL is growing MAC. MAC also isolated from sputum of 10/3/18. No susceptibility reports available yet."        The susceptibility report of the MAC from [facility]:  Organism     MYCOBACTERIUM AVIUM COMPLEX   Antibiotic    TED (mcg/mL)  Interpretation   ----------------------------------------------------------   Moxifloxacin             2       I   Clarithromycin           1       S   Amikacin                64   Streptomycin           >64   Linezolid               64       R   Ethambutol              16   Rifampin           "       8   Rifabutin           <=0.25     He had a repeat chest CT on 1/7 which is unchanged from last chest CT.    He has some dyspnea at rest which I suspect is anxiety related. Able to get on elliptical machine or treadmill at gym for 30 minutes without dyspnea.    He has had two episodes of minor blood streaking of sputum, the last in Nov 2018.  No fever, but he feels cold a lot. No weight loss. Chronic cough productive of phlegm probably COPD related.  Review of Systems   Constitution: Positive for chills and malaise/fatigue. Negative for decreased appetite, fever, weakness, night sweats, weight gain and weight loss.   HENT: Positive for hoarse voice. Negative for congestion, ear pain, hearing loss, sore throat and tinnitus.    Eyes: Negative for blurred vision, redness and visual disturbance.   Cardiovascular: Positive for chest pain. Negative for leg swelling and palpitations.   Respiratory: Positive for cough. Negative for hemoptysis, shortness of breath and sputum production.    Hematologic/Lymphatic: Negative for adenopathy. Does not bruise/bleed easily.   Skin: Negative for dry skin, itching, rash and suspicious lesions.   Musculoskeletal: Negative for back pain, joint pain, myalgias and neck pain.   Gastrointestinal: Negative for abdominal pain, constipation, diarrhea, heartburn, nausea and vomiting.   Genitourinary: Negative for dysuria, flank pain, frequency, hematuria, hesitancy and urgency.   Neurological: Negative for dizziness, headaches, numbness and paresthesias.   Psychiatric/Behavioral: Negative for depression and memory loss. The patient does not have insomnia and is not nervous/anxious.      Objective:   Physical Exam   Constitutional: He is oriented to person, place, and time. He appears well-developed and well-nourished. No distress.   Cardiovascular: Normal rate, regular rhythm and normal heart sounds. Exam reveals no gallop and no friction rub.   No murmur heard.  Pulmonary/Chest: Effort  normal and breath sounds normal. No stridor. No respiratory distress. He has no wheezes. He has no rales.   Neurological: He is alert and oriented to person, place, and time.   Psychiatric: He has a normal mood and affect. His behavior is normal. Thought content normal.   Vitals reviewed.    Assessment:       1. HILLARY (mycobacterium avium-intracellulare) pulmonary infection, stable   2. Centrilobular emphysema    3. Anxiety    4. Dysphagia, oropharyngeal          Plan:        1. No indication for MAC rx now.    2. Repeat chest CT in 6 mos    3. CBC, CMP, ESR, CRP today

## 2019-02-13 ENCOUNTER — TELEPHONE (OUTPATIENT)
Dept: FAMILY MEDICINE | Facility: CLINIC | Age: 79
End: 2019-02-13

## 2019-02-13 DIAGNOSIS — E78.5 HYPERLIPIDEMIA LDL GOAL <100: Primary | ICD-10-CM

## 2019-02-13 DIAGNOSIS — E11.40 CONTROLLED TYPE 2 DIABETES WITH NEUROPATHY: ICD-10-CM

## 2019-02-13 NOTE — TELEPHONE ENCOUNTER
----- Message from Mily Rivas sent at 2/13/2019  3:23 PM CST -----  Contact: Self   Patient is requesting lab order. Please call at [phone]

## 2019-02-14 ENCOUNTER — LAB VISIT (OUTPATIENT)
Dept: LAB | Facility: HOSPITAL | Age: 79
End: 2019-02-14
Attending: INTERNAL MEDICINE
Payer: MEDICARE

## 2019-02-14 DIAGNOSIS — E11.40 CONTROLLED TYPE 2 DIABETES WITH NEUROPATHY: ICD-10-CM

## 2019-02-14 DIAGNOSIS — E78.5 HYPERLIPIDEMIA LDL GOAL <100: ICD-10-CM

## 2019-02-14 LAB
CHOLEST SERPL-MCNC: 133 MG/DL
CHOLEST/HDLC SERPL: 3.2 {RATIO}
ESTIMATED AVG GLUCOSE: 114 MG/DL
HBA1C MFR BLD HPLC: 5.6 %
HDLC SERPL-MCNC: 42 MG/DL
HDLC SERPL: 31.6 %
LDLC SERPL CALC-MCNC: 74.6 MG/DL
NONHDLC SERPL-MCNC: 91 MG/DL
TRIGL SERPL-MCNC: 82 MG/DL

## 2019-02-14 PROCEDURE — 36415 COLL VENOUS BLD VENIPUNCTURE: CPT | Mod: PO

## 2019-02-14 PROCEDURE — 80061 LIPID PANEL: CPT

## 2019-02-14 PROCEDURE — 83036 HEMOGLOBIN GLYCOSYLATED A1C: CPT

## 2019-03-01 ENCOUNTER — OFFICE VISIT (OUTPATIENT)
Dept: FAMILY MEDICINE | Facility: CLINIC | Age: 79
End: 2019-03-01
Payer: MEDICARE

## 2019-03-01 VITALS
OXYGEN SATURATION: 97 % | DIASTOLIC BLOOD PRESSURE: 82 MMHG | BODY MASS INDEX: 24.67 KG/M2 | HEART RATE: 49 BPM | SYSTOLIC BLOOD PRESSURE: 110 MMHG | TEMPERATURE: 98 F | WEIGHT: 166.56 LBS | HEIGHT: 69 IN

## 2019-03-01 DIAGNOSIS — M54.2 NECK PAIN: ICD-10-CM

## 2019-03-01 DIAGNOSIS — J43.2 CENTRILOBULAR EMPHYSEMA: ICD-10-CM

## 2019-03-01 DIAGNOSIS — A31.0 MAI (MYCOBACTERIUM AVIUM-INTRACELLULARE): ICD-10-CM

## 2019-03-01 DIAGNOSIS — E11.40 CONTROLLED TYPE 2 DIABETES WITH NEUROPATHY: ICD-10-CM

## 2019-03-01 DIAGNOSIS — I70.0 ATHEROSCLEROSIS OF AORTA: ICD-10-CM

## 2019-03-01 DIAGNOSIS — Z00.00 ROUTINE MEDICAL EXAM: Primary | ICD-10-CM

## 2019-03-01 DIAGNOSIS — E78.5 HYPERLIPIDEMIA LDL GOAL <100: ICD-10-CM

## 2019-03-01 PROBLEM — J98.4 CAVITARY LESION OF LUNG: Status: RESOLVED | Noted: 2018-10-15 | Resolved: 2019-03-01

## 2019-03-01 PROCEDURE — 3074F SYST BP LT 130 MM HG: CPT | Mod: CPTII,S$GLB,, | Performed by: INTERNAL MEDICINE

## 2019-03-01 PROCEDURE — 99214 OFFICE O/P EST MOD 30 MIN: CPT | Mod: S$GLB,,, | Performed by: INTERNAL MEDICINE

## 2019-03-01 PROCEDURE — 99499 RISK ADDL DX/OHS AUDIT: ICD-10-PCS | Mod: S$GLB,,, | Performed by: INTERNAL MEDICINE

## 2019-03-01 PROCEDURE — 99999 PR PBB SHADOW E&M-EST. PATIENT-LVL III: CPT | Mod: PBBFAC,,, | Performed by: INTERNAL MEDICINE

## 2019-03-01 PROCEDURE — 3074F PR MOST RECENT SYSTOLIC BLOOD PRESSURE < 130 MM HG: ICD-10-PCS | Mod: CPTII,S$GLB,, | Performed by: INTERNAL MEDICINE

## 2019-03-01 PROCEDURE — 99499 UNLISTED E&M SERVICE: CPT | Mod: S$GLB,,, | Performed by: INTERNAL MEDICINE

## 2019-03-01 PROCEDURE — 99999 PR PBB SHADOW E&M-EST. PATIENT-LVL III: ICD-10-PCS | Mod: PBBFAC,,, | Performed by: INTERNAL MEDICINE

## 2019-03-01 PROCEDURE — 3079F PR MOST RECENT DIASTOLIC BLOOD PRESSURE 80-89 MM HG: ICD-10-PCS | Mod: CPTII,S$GLB,, | Performed by: INTERNAL MEDICINE

## 2019-03-01 PROCEDURE — 99214 PR OFFICE/OUTPT VISIT, EST, LEVL IV, 30-39 MIN: ICD-10-PCS | Mod: S$GLB,,, | Performed by: INTERNAL MEDICINE

## 2019-03-01 PROCEDURE — 3079F DIAST BP 80-89 MM HG: CPT | Mod: CPTII,S$GLB,, | Performed by: INTERNAL MEDICINE

## 2019-03-01 RX ORDER — PRAVASTATIN SODIUM 10 MG/1
10 TABLET ORAL DAILY
Qty: 90 TABLET | Refills: 1 | Status: SHIPPED | OUTPATIENT
Start: 2019-03-01 | End: 2019-10-02 | Stop reason: SDUPTHER

## 2019-03-14 ENCOUNTER — OFFICE VISIT (OUTPATIENT)
Dept: OTOLARYNGOLOGY | Facility: CLINIC | Age: 79
End: 2019-03-14
Payer: MEDICARE

## 2019-03-14 ENCOUNTER — CLINICAL SUPPORT (OUTPATIENT)
Dept: AUDIOLOGY | Facility: CLINIC | Age: 79
End: 2019-03-14
Payer: MEDICARE

## 2019-03-14 VITALS
WEIGHT: 167.44 LBS | SYSTOLIC BLOOD PRESSURE: 112 MMHG | HEIGHT: 67 IN | DIASTOLIC BLOOD PRESSURE: 72 MMHG | BODY MASS INDEX: 26.28 KG/M2

## 2019-03-14 DIAGNOSIS — H90.3 SENSORINEURAL HEARING LOSS (SNHL) OF BOTH EARS: ICD-10-CM

## 2019-03-14 DIAGNOSIS — H61.23 IMPACTED CERUMEN OF BOTH EARS: Primary | ICD-10-CM

## 2019-03-14 DIAGNOSIS — H90.3 SENSORINEURAL HEARING LOSS, BILATERAL: Primary | ICD-10-CM

## 2019-03-14 PROCEDURE — 1101F PT FALLS ASSESS-DOCD LE1/YR: CPT | Mod: CPTII,S$GLB,, | Performed by: OTOLARYNGOLOGY

## 2019-03-14 PROCEDURE — 99203 OFFICE O/P NEW LOW 30 MIN: CPT | Mod: 25,S$GLB,, | Performed by: OTOLARYNGOLOGY

## 2019-03-14 PROCEDURE — 3074F SYST BP LT 130 MM HG: CPT | Mod: CPTII,S$GLB,, | Performed by: OTOLARYNGOLOGY

## 2019-03-14 PROCEDURE — 69210 EAR CERUMEN REMOVAL: ICD-10-PCS | Mod: S$GLB,,, | Performed by: OTOLARYNGOLOGY

## 2019-03-14 PROCEDURE — 92550 PR TYMPANOMETRY AND REFLEX THRESHOLD MEASUREMENTS: ICD-10-PCS | Mod: S$GLB,,, | Performed by: AUDIOLOGIST

## 2019-03-14 PROCEDURE — 69210 REMOVE IMPACTED EAR WAX UNI: CPT | Mod: S$GLB,,, | Performed by: OTOLARYNGOLOGY

## 2019-03-14 PROCEDURE — 99203 PR OFFICE/OUTPT VISIT, NEW, LEVL III, 30-44 MIN: ICD-10-PCS | Mod: 25,S$GLB,, | Performed by: OTOLARYNGOLOGY

## 2019-03-14 PROCEDURE — 3074F PR MOST RECENT SYSTOLIC BLOOD PRESSURE < 130 MM HG: ICD-10-PCS | Mod: CPTII,S$GLB,, | Performed by: OTOLARYNGOLOGY

## 2019-03-14 PROCEDURE — 92550 TYMPANOMETRY & REFLEX THRESH: CPT | Mod: S$GLB,,, | Performed by: AUDIOLOGIST

## 2019-03-14 PROCEDURE — 92557 PR COMPREHENSIVE HEARING TEST: ICD-10-PCS | Mod: S$GLB,,, | Performed by: AUDIOLOGIST

## 2019-03-14 PROCEDURE — 1101F PR PT FALLS ASSESS DOC 0-1 FALLS W/OUT INJ PAST YR: ICD-10-PCS | Mod: CPTII,S$GLB,, | Performed by: OTOLARYNGOLOGY

## 2019-03-14 PROCEDURE — 3078F DIAST BP <80 MM HG: CPT | Mod: CPTII,S$GLB,, | Performed by: OTOLARYNGOLOGY

## 2019-03-14 PROCEDURE — 92557 COMPREHENSIVE HEARING TEST: CPT | Mod: S$GLB,,, | Performed by: AUDIOLOGIST

## 2019-03-14 PROCEDURE — 3078F PR MOST RECENT DIASTOLIC BLOOD PRESSURE < 80 MM HG: ICD-10-PCS | Mod: CPTII,S$GLB,, | Performed by: OTOLARYNGOLOGY

## 2019-03-14 NOTE — PROGRESS NOTES
Click the link below to view the audiogram in full:  Document on 3/14/2019  4:07 PM by FRITZ AlbertoD: Audiogram    Findings:     Pt was seen today for a hearing test.  Otoscopy revealed impacted cerumen.  Pt was seen by Dr. Hathc for an ear cleaning.  Following the cleaning the testing revealed mild to severe SNHL, bilaterally, with normal tympanograms.

## 2019-03-14 NOTE — PROGRESS NOTES
Subjective:       Patient ID: Diego Guntre is a 78 y.o. male.    Chief Complaint: Cerumen Impaction (ear cleaning, hearing test)    Hearing Loss:   Chronicity:  Chronic  Onset:  More than 1 month ago  Progression since onset:  Unchanged  Frequency:  Constantly  Severity:  Mild  Hearing loss characteristics:  Difficult on telephone and trouble hearing TVNo fever and no headaches.  Treatments tried:  Nothing  Improvement on treatment:  No relief    Review of Systems   Constitutional: Negative for chills, fever and unexpected weight change.   HENT: Positive for hearing loss. Negative for sore throat and trouble swallowing.    Eyes: Negative for pain and visual disturbance.   Respiratory: Negative for apnea and shortness of breath.    Cardiovascular: Negative for chest pain and palpitations.   Gastrointestinal: Negative for abdominal pain and nausea.   Endocrine: Negative for cold intolerance and heat intolerance.   Musculoskeletal: Negative for joint swelling and neck stiffness.   Skin: Negative for color change and rash.   Neurological: Negative for facial asymmetry and headaches.   Hematological: Negative for adenopathy. Does not bruise/bleed easily.   Psychiatric/Behavioral: Negative for agitation. The patient is not nervous/anxious.        Objective:      Physical Exam   Constitutional: He is oriented to person, place, and time. He appears well-developed and well-nourished. No distress.   HENT:   Head: Normocephalic and atraumatic.   Right Ear: Tympanic membrane, external ear and ear canal normal. Decreased hearing (cerumen impaction see procedure note) is noted.   Left Ear: Tympanic membrane, external ear and ear canal normal. Decreased hearing (cerumen impaction) is noted.   Nose: Nose normal.   Mouth/Throat: Uvula is midline, oropharynx is clear and moist and mucous membranes are normal.   Gonzales: Midline  Rinne: AC > BC @ 512Hz   Eyes: Conjunctivae and EOM are normal. Pupils are equal, round, and reactive  to light.   Neck: Normal range of motion. No tracheal deviation present. No thyromegaly present.   Cardiovascular: Normal rate and regular rhythm.   Pulmonary/Chest: Effort normal. No respiratory distress.   Musculoskeletal: Normal range of motion.   Lymphadenopathy:        Head (right side): No submental, no submandibular and no tonsillar adenopathy present.        Head (left side): No submental, no submandibular and no tonsillar adenopathy present.     He has no cervical adenopathy.   Neurological: He is alert and oriented to person, place, and time.   Psychiatric: He has a normal mood and affect. His behavior is normal.   Nursing note and vitals reviewed.      Data:  Audiogram tracings independently reviewed and discussed with patient.  There is a high frequency SNHL with overall mild hearing loss on pure tone average, and speech discrimination is 100%.  Tympanometry is type A in both ears.   Assessment:       1. Impacted cerumen of both ears    2. Sensorineural hearing loss (SNHL) of both ears        Plan:       -HEARING LOSS-  I spent a considerable amount of time educating the patient on hearing and hearing loss.  We discussed the basic characteristics of conductive hearing loss versus sensorineural hearing loss and the significant differences in treatment options between the two categories.      We discussed that in cases of conductive hearing loss, this suggests a mechanical disorder that sometimes can be improved with medications &/or surgery.  It may occur secondary to external ear pathology (atresia, otitis externa, etc), tympanic membrane disorders (large perforations, immobility due to scarring or eustachian tube dysfunction), and middle ear disorders (effusions, ossicular disorders).    Sensorineural hearing loss is the expected hearing loss pattern with aging, but some disorders such as Meniere's may accelerate this process.  Additionally, amplification with hearing aids is generally the best option  for hearing rehabilitation, except where the hearing loss is profound.  We discussed that this generally does not represent a dangerous condition, but in cases where there is a large discrepancy between the two ears in terms of nerve function, more investigation is often necessary due to the possiblity of vestibular schwannomas or meningiomas at the cerebellopontine angle.  The definitive test for this is an MRI with gadolinium.  However, these masses are usually very slow growing (1-2mm/year), so patients may elect to repeat an audiogram in about 6 months or obtain an ABR so long as they understand that this may result in a delay in diagnosis (although very unlikely that this would have a significant clinical impact on their outcome due to the slow growing nature of these masses) with the understanding that if ABR is abnormal or asymmetry increases, an MRI would then be required.    Diego  presents with what appears to be sensorineural hearing loss.  Based on this, my recommendation is hearing aid evaluation if desired

## 2019-03-14 NOTE — PROCEDURES
Ear Cerumen Removal  Date/Time: 3/14/2019 3:46 PM  Performed by: Alhaji Hatch MD  Authorized by: Alhaji Hatch MD     Consent Done?:  Yes (Verbal)  Location details:  Both ears  Procedure type: curette    Cerumen  Removal Results:  Cerumen completely removed  Patient tolerance:  Patient tolerated the procedure well with no immediate complications     Binocular microscopy used to examine ear canal and tympanic membrane.  There was a cerumen impaction on the right side and an impaction of cerumen on the left.  This was removed using a curette and other microinstrumentation.

## 2019-05-02 ENCOUNTER — PATIENT MESSAGE (OUTPATIENT)
Dept: INFECTIOUS DISEASES | Facility: CLINIC | Age: 79
End: 2019-05-02

## 2019-07-15 ENCOUNTER — TELEPHONE (OUTPATIENT)
Dept: PULMONOLOGY | Facility: CLINIC | Age: 79
End: 2019-07-15

## 2019-07-15 DIAGNOSIS — R91.1 LUNG NODULE: ICD-10-CM

## 2019-07-15 NOTE — TELEPHONE ENCOUNTER
New order for ct was placed.  Please schedule ct of chest and clinic follow up with me after ct.

## 2019-07-15 NOTE — TELEPHONE ENCOUNTER
----- Message from Milli Castillo MA sent at 7/15/2019  9:13 AM CDT -----  Contact: pt  Pt cancelled the CT due to bad weather, and would like to reschedule please put a new order in.  ----- Message -----  From: Wanda Gallardo  Sent: 7/15/2019   8:58 AM  To: Izabela Sumner V Staff    Pt had to cancel Ct  Friday due to weather    But now there is no order   To  Reschedule    Needs Order in [facility]    Please call pt to reschedule  Ph [phone]    Thanks

## 2019-07-18 ENCOUNTER — HOSPITAL ENCOUNTER (OUTPATIENT)
Dept: RADIOLOGY | Facility: HOSPITAL | Age: 79
Discharge: HOME | End: 2019-07-18
Attending: INTERNAL MEDICINE
Payer: MEDICARE

## 2019-07-18 DIAGNOSIS — R91.1 LUNG NODULE: ICD-10-CM

## 2019-07-18 PROCEDURE — 71250 CT CHEST WITHOUT CONTRAST: ICD-10-PCS | Mod: 26,,, | Performed by: RADIOLOGY

## 2019-07-18 PROCEDURE — 71250 CT THORAX DX C-: CPT | Mod: 26,,, | Performed by: RADIOLOGY

## 2019-07-18 PROCEDURE — 71250 CT THORAX DX C-: CPT | Mod: TC

## 2019-07-31 ENCOUNTER — LAB VISIT (OUTPATIENT)
Dept: LAB | Facility: HOSPITAL | Age: 79
End: 2019-07-31
Attending: INTERNAL MEDICINE
Payer: MEDICARE

## 2019-07-31 ENCOUNTER — OFFICE VISIT (OUTPATIENT)
Dept: INFECTIOUS DISEASES | Facility: CLINIC | Age: 79
End: 2019-07-31
Payer: MEDICARE

## 2019-07-31 VITALS
HEART RATE: 52 BPM | BODY MASS INDEX: 25.78 KG/M2 | TEMPERATURE: 98 F | WEIGHT: 164.25 LBS | DIASTOLIC BLOOD PRESSURE: 63 MMHG | SYSTOLIC BLOOD PRESSURE: 101 MMHG | HEIGHT: 67 IN

## 2019-07-31 DIAGNOSIS — A31.0 MAI (MYCOBACTERIUM AVIUM-INTRACELLULARE): ICD-10-CM

## 2019-07-31 DIAGNOSIS — A31.0 MAI (MYCOBACTERIUM AVIUM-INTRACELLULARE): Primary | ICD-10-CM

## 2019-07-31 LAB
ALBUMIN SERPL BCP-MCNC: 4.1 G/DL (ref 3.5–5.2)
ALP SERPL-CCNC: 73 U/L (ref 55–135)
ALT SERPL W/O P-5'-P-CCNC: 19 U/L (ref 10–44)
ANION GAP SERPL CALC-SCNC: 6 MMOL/L (ref 8–16)
AST SERPL-CCNC: 24 U/L (ref 10–40)
BASOPHILS # BLD AUTO: 0.05 K/UL (ref 0–0.2)
BASOPHILS NFR BLD: 0.7 % (ref 0–1.9)
BILIRUB SERPL-MCNC: 0.7 MG/DL (ref 0.1–1)
BUN SERPL-MCNC: 9 MG/DL (ref 8–23)
CALCIUM SERPL-MCNC: 10.1 MG/DL (ref 8.7–10.5)
CHLORIDE SERPL-SCNC: 104 MMOL/L (ref 95–110)
CO2 SERPL-SCNC: 31 MMOL/L (ref 23–29)
CREAT SERPL-MCNC: 0.9 MG/DL (ref 0.5–1.4)
CRP SERPL-MCNC: 1.2 MG/L (ref 0–8.2)
DIFFERENTIAL METHOD: ABNORMAL
EOSINOPHIL # BLD AUTO: 0.1 K/UL (ref 0–0.5)
EOSINOPHIL NFR BLD: 0.9 % (ref 0–8)
ERYTHROCYTE [DISTWIDTH] IN BLOOD BY AUTOMATED COUNT: 13.8 % (ref 11.5–14.5)
ERYTHROCYTE [SEDIMENTATION RATE] IN BLOOD BY WESTERGREN METHOD: 4 MM/HR (ref 0–23)
EST. GFR  (AFRICAN AMERICAN): >60 ML/MIN/1.73 M^2
EST. GFR  (NON AFRICAN AMERICAN): >60 ML/MIN/1.73 M^2
GLUCOSE SERPL-MCNC: 97 MG/DL (ref 70–110)
HCT VFR BLD AUTO: 49.5 % (ref 40–54)
HGB BLD-MCNC: 15.5 G/DL (ref 14–18)
IMM GRANULOCYTES # BLD AUTO: 0.01 K/UL (ref 0–0.04)
IMM GRANULOCYTES NFR BLD AUTO: 0.1 % (ref 0–0.5)
LYMPHOCYTES # BLD AUTO: 2 K/UL (ref 1–4.8)
LYMPHOCYTES NFR BLD: 30.3 % (ref 18–48)
MCH RBC QN AUTO: 28.7 PG (ref 27–31)
MCHC RBC AUTO-ENTMCNC: 31.3 G/DL (ref 32–36)
MCV RBC AUTO: 92 FL (ref 82–98)
MONOCYTES # BLD AUTO: 0.7 K/UL (ref 0.3–1)
MONOCYTES NFR BLD: 10 % (ref 4–15)
NEUTROPHILS # BLD AUTO: 3.9 K/UL (ref 1.8–7.7)
NEUTROPHILS NFR BLD: 58 % (ref 38–73)
NRBC BLD-RTO: 0 /100 WBC
PLATELET # BLD AUTO: 259 K/UL (ref 150–350)
PMV BLD AUTO: 9.8 FL (ref 9.2–12.9)
POTASSIUM SERPL-SCNC: 5.1 MMOL/L (ref 3.5–5.1)
PROT SERPL-MCNC: 7.8 G/DL (ref 6–8.4)
RBC # BLD AUTO: 5.41 M/UL (ref 4.6–6.2)
SODIUM SERPL-SCNC: 141 MMOL/L (ref 136–145)
WBC # BLD AUTO: 6.69 K/UL (ref 3.9–12.7)

## 2019-07-31 PROCEDURE — 3078F PR MOST RECENT DIASTOLIC BLOOD PRESSURE < 80 MM HG: ICD-10-PCS | Mod: CPTII,S$GLB,, | Performed by: INTERNAL MEDICINE

## 2019-07-31 PROCEDURE — 1101F PT FALLS ASSESS-DOCD LE1/YR: CPT | Mod: CPTII,S$GLB,, | Performed by: INTERNAL MEDICINE

## 2019-07-31 PROCEDURE — 3078F DIAST BP <80 MM HG: CPT | Mod: CPTII,S$GLB,, | Performed by: INTERNAL MEDICINE

## 2019-07-31 PROCEDURE — 85652 RBC SED RATE AUTOMATED: CPT

## 2019-07-31 PROCEDURE — 36415 COLL VENOUS BLD VENIPUNCTURE: CPT

## 2019-07-31 PROCEDURE — 85025 COMPLETE CBC W/AUTO DIFF WBC: CPT

## 2019-07-31 PROCEDURE — 99999 PR PBB SHADOW E&M-EST. PATIENT-LVL III: ICD-10-PCS | Mod: PBBFAC,,, | Performed by: INTERNAL MEDICINE

## 2019-07-31 PROCEDURE — 3074F PR MOST RECENT SYSTOLIC BLOOD PRESSURE < 130 MM HG: ICD-10-PCS | Mod: CPTII,S$GLB,, | Performed by: INTERNAL MEDICINE

## 2019-07-31 PROCEDURE — 80053 COMPREHEN METABOLIC PANEL: CPT

## 2019-07-31 PROCEDURE — 99214 PR OFFICE/OUTPT VISIT, EST, LEVL IV, 30-39 MIN: ICD-10-PCS | Mod: S$GLB,,, | Performed by: INTERNAL MEDICINE

## 2019-07-31 PROCEDURE — 1101F PR PT FALLS ASSESS DOC 0-1 FALLS W/OUT INJ PAST YR: ICD-10-PCS | Mod: CPTII,S$GLB,, | Performed by: INTERNAL MEDICINE

## 2019-07-31 PROCEDURE — 99499 UNLISTED E&M SERVICE: CPT | Mod: S$GLB,,, | Performed by: INTERNAL MEDICINE

## 2019-07-31 PROCEDURE — 99214 OFFICE O/P EST MOD 30 MIN: CPT | Mod: S$GLB,,, | Performed by: INTERNAL MEDICINE

## 2019-07-31 PROCEDURE — 3074F SYST BP LT 130 MM HG: CPT | Mod: CPTII,S$GLB,, | Performed by: INTERNAL MEDICINE

## 2019-07-31 PROCEDURE — 99499 RISK ADDL DX/OHS AUDIT: ICD-10-PCS | Mod: S$GLB,,, | Performed by: INTERNAL MEDICINE

## 2019-07-31 PROCEDURE — 86140 C-REACTIVE PROTEIN: CPT

## 2019-07-31 PROCEDURE — 99999 PR PBB SHADOW E&M-EST. PATIENT-LVL III: CPT | Mod: PBBFAC,,, | Performed by: INTERNAL MEDICINE

## 2019-07-31 NOTE — PROGRESS NOTES
Subjective:      Patient ID: Diego Gunter is a 78 y.o. male.    Chief Complaint:Follow-up      History of Present Illness    HILLARY (mycobacterium avium-intracellulare)  Pt had repeat CT scan of chest on 7/18/19:  Right upper lobe cavitary lesion.  The cavitary component is stable in size however there is increasing consolidation surrounding the lesion in the anterior segment of the right upper lobe.  Also faint consolidation in the left upper lobe.  Overall findings suggests mild airways inflammation/infection.  Short-term follow-up is recommended with repeat scan in 3 months to ensure resolution.  Otherwise, no significant detrimental changes.    He had multiple episodes of minor hemoptysis in May but has had none in June or July. He feels well and is working out at the gym every day. Will ask Dr. Gurrola to review pt and see what he recommends re treatment vs continued observation of pt. I am inclined to keep on observation with CT f/u given how well he feels and functions    CBC, CMP, ESR, CRP all normal    Review of Systems   Constitution: Negative for chills, decreased appetite, fever, malaise/fatigue, night sweats, weight gain and weight loss.   HENT: Negative for congestion, ear pain, hearing loss, hoarse voice, sore throat and tinnitus.    Eyes: Negative for blurred vision, redness and visual disturbance.   Cardiovascular: Negative for chest pain, leg swelling and palpitations.   Respiratory: Positive for hemoptysis and sputum production. Negative for cough and shortness of breath.    Hematologic/Lymphatic: Negative for adenopathy. Does not bruise/bleed easily.   Skin: Negative for dry skin, itching, rash and suspicious lesions.   Musculoskeletal: Negative for back pain, joint pain, myalgias and neck pain.   Gastrointestinal: Negative for abdominal pain, constipation, diarrhea, heartburn, nausea and vomiting.   Genitourinary: Negative for dysuria, flank pain, frequency, hematuria, hesitancy and urgency.    Neurological: Negative for dizziness, headaches, numbness, paresthesias and weakness.   Psychiatric/Behavioral: Negative for depression and memory loss. The patient does not have insomnia and is not nervous/anxious.      Objective:   Physical Exam   Constitutional: He is oriented to person, place, and time. He appears well-developed and well-nourished. No distress.   Cardiovascular: Normal rate, regular rhythm and normal heart sounds. Exam reveals no gallop and no friction rub.   No murmur heard.  Pulmonary/Chest: Effort normal and breath sounds normal. No respiratory distress. He has no wheezes. He has no rales.   Neurological: He is alert and oriented to person, place, and time.   Vitals reviewed.    Assessment:       1. HILLARY (mycobacterium avium-intracellulare) , stable symptomatically, slight change in CT         Plan:        1. see Dr. Gurrola; inclined to observe not treat at this point

## 2019-07-31 NOTE — ASSESSMENT & PLAN NOTE
Pt had repeat CT scan of chest on 7/18/19:  Right upper lobe cavitary lesion.  The cavitary component is stable in size however there is increasing consolidation surrounding the lesion in the anterior segment of the right upper lobe.  Also faint consolidation in the left upper lobe.  Overall findings suggests mild airways inflammation/infection.  Short-term follow-up is recommended with repeat scan in 3 months to ensure resolution.  Otherwise, no significant detrimental changes.    He had multiple episodes of minor hemoptysis in May but has had none in June or July. He feels well and is working out at the gym every day. Will ask Dr. Gurrola to review pt and see what he recommends re treatment vs continued observation of pt. I am inclined to keep on observation with CT f/u given how well he feels and functions

## 2019-08-20 LAB
LEFT EYE DM RETINOPATHY: NEGATIVE
RIGHT EYE DM RETINOPATHY: NEGATIVE

## 2019-08-21 ENCOUNTER — PATIENT OUTREACH (OUTPATIENT)
Dept: ADMINISTRATIVE | Facility: OTHER | Age: 79
End: 2019-08-21

## 2019-08-22 ENCOUNTER — OFFICE VISIT (OUTPATIENT)
Dept: PULMONOLOGY | Facility: CLINIC | Age: 79
End: 2019-08-22
Payer: MEDICARE

## 2019-08-22 VITALS
BODY MASS INDEX: 25.74 KG/M2 | HEART RATE: 56 BPM | DIASTOLIC BLOOD PRESSURE: 70 MMHG | WEIGHT: 164 LBS | OXYGEN SATURATION: 97 % | SYSTOLIC BLOOD PRESSURE: 111 MMHG | HEIGHT: 67 IN

## 2019-08-22 DIAGNOSIS — J43.2 CENTRILOBULAR EMPHYSEMA: ICD-10-CM

## 2019-08-22 DIAGNOSIS — A31.0 MAI (MYCOBACTERIUM AVIUM-INTRACELLULARE): ICD-10-CM

## 2019-08-22 DIAGNOSIS — J98.4 CAVITARY LUNG DISEASE: ICD-10-CM

## 2019-08-22 PROCEDURE — 99999 PR PBB SHADOW E&M-EST. PATIENT-LVL III: CPT | Mod: PBBFAC,,, | Performed by: INTERNAL MEDICINE

## 2019-08-22 PROCEDURE — 1101F PT FALLS ASSESS-DOCD LE1/YR: CPT | Mod: CPTII,S$GLB,, | Performed by: INTERNAL MEDICINE

## 2019-08-22 PROCEDURE — 3078F DIAST BP <80 MM HG: CPT | Mod: CPTII,S$GLB,, | Performed by: INTERNAL MEDICINE

## 2019-08-22 PROCEDURE — 99214 PR OFFICE/OUTPT VISIT, EST, LEVL IV, 30-39 MIN: ICD-10-PCS | Mod: S$GLB,,, | Performed by: INTERNAL MEDICINE

## 2019-08-22 PROCEDURE — 99214 OFFICE O/P EST MOD 30 MIN: CPT | Mod: S$GLB,,, | Performed by: INTERNAL MEDICINE

## 2019-08-22 PROCEDURE — 99499 UNLISTED E&M SERVICE: CPT | Mod: S$GLB,,, | Performed by: INTERNAL MEDICINE

## 2019-08-22 PROCEDURE — 3074F SYST BP LT 130 MM HG: CPT | Mod: CPTII,S$GLB,, | Performed by: INTERNAL MEDICINE

## 2019-08-22 PROCEDURE — 99499 RISK ADDL DX/OHS AUDIT: ICD-10-PCS | Mod: S$GLB,,, | Performed by: INTERNAL MEDICINE

## 2019-08-22 PROCEDURE — 1101F PR PT FALLS ASSESS DOC 0-1 FALLS W/OUT INJ PAST YR: ICD-10-PCS | Mod: CPTII,S$GLB,, | Performed by: INTERNAL MEDICINE

## 2019-08-22 PROCEDURE — 3078F PR MOST RECENT DIASTOLIC BLOOD PRESSURE < 80 MM HG: ICD-10-PCS | Mod: CPTII,S$GLB,, | Performed by: INTERNAL MEDICINE

## 2019-08-22 PROCEDURE — 99999 PR PBB SHADOW E&M-EST. PATIENT-LVL III: ICD-10-PCS | Mod: PBBFAC,,, | Performed by: INTERNAL MEDICINE

## 2019-08-22 PROCEDURE — 3074F PR MOST RECENT SYSTOLIC BLOOD PRESSURE < 130 MM HG: ICD-10-PCS | Mod: CPTII,S$GLB,, | Performed by: INTERNAL MEDICINE

## 2019-08-22 NOTE — ASSESSMENT & PLAN NOTE
Repeat ct 7/19 compared to 9/18 without significant changes.  Clinically asymptomatic.  Will repeat ct in 6 months.

## 2019-08-22 NOTE — PROGRESS NOTES
Diego Gunter  was seen as a follow up.    CHIEF COMPLAINT:  Follow-up (HILLARY )      HISTORY OF PRESENT ILLNESS: Diego Gunter is a 78 y.o. male  has a past medical history of Dysphagia, colonic polyps, Hyperlipidemia LDL goal <100, Overweight(278.02), Prostate cancer (september 2011), and Type II or unspecified type diabetes mellitus without mention of complication, not stated as uncontrolled.  Our first encounter was 10/2/18.  Patient was diagnosed with prostate in 2011 s/p prostatectomy.  Patient with remote smoking history and quit in 1962.  Patient worked in [employer] as a  over 35 years.  Some sandblasting.      Patient with persistent productive cough x 1 year.  +dark brown phlegm.  No hemoptysis.  Patient underwent CT of chest by pcp which revealed rul cavitary lesion.  quantiferon and HIV were negative.   +weight loss 30 lbs since 2016.      S/p fob 10/15/18 with bal.  After fob, patient developed fever,sob and confusion.  Patient was admitted with doxy and rocephin.  Patient was discharge with augmentin.   Fob was negative for malignancy.  Bal with hillary.  Patient was seen by Dr. Clark.  No treatment.  Decision is to follow with serial ct imaging.      Patient routinely exercise with treadmill, ellipitical 4 times per week for 1 hour session without issue.    PAST MEDICAL HISTORY:    Active Ambulatory Problems     Diagnosis Date Noted    History of prostate cancer 09/01/2011    Hx of colonic polyps     Controlled type 2 diabetes with neuropathy     Allergic rhinitis 07/07/2014    Anxiety 07/14/2014    Hyperlipidemia LDL goal <100     Dysphagia, oropharyngeal 06/17/2016    Atherosclerosis of aorta 09/27/2018    Centrilobular emphysema 09/27/2018    Cavitary lung disease 10/16/2018    HILLARY (mycobacterium avium-intracellulare) 10/26/2018     Resolved Ambulatory Problems     Diagnosis Date Noted    Overweight (BMI 25.0-29.9)     Occipital neuralgia 07/14/2014    Headache(784.0)  2014    Dog scratch 2015    Cellulitis of arm, right 2015    Cavitary lesion of lung 10/15/2018    Pneumonia of both lungs due to infectious organism 10/15/2018    Hemoptysis 10/26/2018     Past Medical History:   Diagnosis Date    Dysphagia     Hx of colonic polyps     Hyperlipidemia LDL goal <100     Overweight(278.02)     Prostate cancer 2011    Type II or unspecified type diabetes mellitus without mention of complication, not stated as uncontrolled                 PAST SURGICAL HISTORY:    Past Surgical History:   Procedure Laterality Date    BRONCHOSCOPY N/A 10/15/2018    Performed [facility]    CATARACT EXTRACTION, BILATERAL      ESOPHAGOGASTRODUODENOSCOPY (EGD) N/A 2016    Performed by [facility] at [facility] ENDO    PROSTATECTOMY           FAMILY HISTORY:                Family History   Problem Relation Age of Onset    Colon cancer Mother     Diabetes Mother     Heart disease Father     Mental illness Sister     Diabetes Sister     Other Brother         polio as a child    Diabetes Unknown     Diabetes Brother     Myasthenia gravis Brother     Parkinsonism Brother     Diabetes Brother     Dementia Brother     Lung cancer Brother         smoker    Diabetes Maternal Aunt        SOCIAL HISTORY:          Tobacco:   Social History     Tobacco Use   Smoking Status Former Smoker    Packs/day: 0.25    Years: 5.00    Pack years: 1.25    Last attempt to quit: 10/2/1962    Years since quittin.9   Smokeless Tobacco Never Used   Tobacco Comment    quit age 23     alcohol use:    Social History     Substance and Sexual Activity   Alcohol Use Yes    Comment: beer occasionally               Occupation:  Former shipyard worker    ALLERGIES:  Review of patient's allergies indicates:  No Known Allergies    CURRENT MEDICATIONS:    Current Outpatient Medications   Medication Sig Dispense Refill    ascorbic acid  "(VITAMIN C) 1000 MG tablet Take 1,000 mg by mouth once daily.        omega 3-dha-epa-fish oil 1,000 (120-180) mg Cap Take 1 capsule by mouth once daily.        omeprazole 20 mg TbEC 1/2 tablet daily in am as needed. 45 each 0    pravastatin (PRAVACHOL) 10 MG tablet Take 1 tablet (10 mg total) by mouth once daily. 90 tablet 1     No current facility-administered medications for this visit.                   REVIEW OF SYSTEMS:     Pulmonary related symptoms as per HPI.  Gen:  no weight loss, no fever, no night sweat  HEENT:  no visual changes, no sore throat, no hearing loss  CV:  No chest pain, no orthopnea, no PND  GI:  no melena, no hematochezia, no diarhea, no constipation.  H/o dysphagia  :  no dysuria, no hematuria, no hesistancy, no dribbling  Neuro:  no syncope, no vertigo, no tinitus  Psych:  No homocide or suicide ideation; no depression.  Endocrine:  No heat or cold intolerance.  Sleep:  No snoring; no witnessed apnea.  Otherwise, a balance of systems reviewed is negative.          PHYSICAL EXAM:  Vitals:    08/22/19 0905   BP: 111/70   Pulse: (!) 56   SpO2: 97%   Weight: 74.4 kg (164 lb 0.4 oz)   Height: 5' 7" (1.702 m)   PainSc: 0-No pain     Body mass index is 25.69 kg/m².     GENERAL:  well develop; no apparent distress  HEENT:  no nasal congestion; no discharge noted; class 3 modified mallampatti.   NECK:  supple; no palpable masses.  CARDIO: regular rate and rhythm  PULM:  clear to auscultation bilaterally; no intercostals retractions; no accessory muscle usage   ABDOMEN:  soft nontender/nondistended.  +bowel sound  EXTREMITIES no cce  NEURO:  CN II-XII intact.  5/5 motor in all extremities.  sensation grossly intact   to light touch.  PSYCH:  normal affect.  Alert and oriented x 4    LABS  Pulmonary Functions Testing Results: 10/8/18 unable to produce acceptable effort.    ABG none  CXR:  8/5/15 no effusion or consoliation  CT CHEST:  9/21/18 bilateral emphysematous changes.  RUL cavitary lesion " (new when compared to 2016 ct of neck)  cta 10/15/18 persistent rul cavitary lesion  7/15/19 no changes in rul cavitation.      ASSESSMENT  Problem List Items Addressed This Visit     Cavitary lung disease    Overview     rul cavitary lesion s/p fob.  Cytology from fob is negative for malignancy.  Sputum 10/3/18 with hillary.           Current Assessment & Plan     Repeat ct 7/19 compared to 9/18 without significant changes.  Clinically asymptomatic.  Will repeat ct in 6 months.           Relevant Orders    CT Chest Without Contrast    Centrilobular emphysema    Overview     - noted on CT of chest 9/2018         Current Assessment & Plan     Clinically asymptomatic.           HILLARY (mycobacterium avium-intracellulare)    Overview     - diagnosed Fall 2018  - followed by ID  - no need for Rx treatment at this time per Dr. Clark.                Patient will Follow up in about 6 months (around 2/22/2020). with md/np.

## 2019-08-23 ENCOUNTER — PATIENT OUTREACH (OUTPATIENT)
Dept: ADMINISTRATIVE | Facility: HOSPITAL | Age: 79
End: 2019-08-23

## 2019-08-23 DIAGNOSIS — E11.40 CONTROLLED TYPE 2 DIABETES WITH NEUROPATHY: Primary | ICD-10-CM

## 2019-08-23 RX ORDER — PRAVASTATIN SODIUM 10 MG/1
TABLET ORAL
COMMUNITY
Start: 2019-01-14 | End: 2019-10-03 | Stop reason: SDUPTHER

## 2019-08-29 ENCOUNTER — LAB VISIT (OUTPATIENT)
Dept: LAB | Facility: HOSPITAL | Age: 79
End: 2019-08-29
Attending: INTERNAL MEDICINE
Payer: MEDICARE

## 2019-08-29 DIAGNOSIS — E11.40 CONTROLLED TYPE 2 DIABETES WITH NEUROPATHY: ICD-10-CM

## 2019-08-29 LAB
ESTIMATED AVG GLUCOSE: 114 MG/DL (ref 68–131)
HBA1C MFR BLD HPLC: 5.6 % (ref 4–5.6)

## 2019-08-29 PROCEDURE — 83036 HEMOGLOBIN GLYCOSYLATED A1C: CPT

## 2019-08-29 PROCEDURE — 36415 COLL VENOUS BLD VENIPUNCTURE: CPT | Mod: PO

## 2019-09-06 ENCOUNTER — OFFICE VISIT (OUTPATIENT)
Dept: FAMILY MEDICINE | Facility: CLINIC | Age: 79
End: 2019-09-06
Payer: MEDICARE

## 2019-09-06 VITALS
DIASTOLIC BLOOD PRESSURE: 60 MMHG | BODY MASS INDEX: 25.9 KG/M2 | WEIGHT: 165 LBS | TEMPERATURE: 98 F | SYSTOLIC BLOOD PRESSURE: 92 MMHG | OXYGEN SATURATION: 99 % | HEIGHT: 67 IN | HEART RATE: 57 BPM

## 2019-09-06 DIAGNOSIS — E78.5 HYPERLIPIDEMIA LDL GOAL <100: ICD-10-CM

## 2019-09-06 DIAGNOSIS — E11.40 CONTROLLED TYPE 2 DIABETES WITH NEUROPATHY: Primary | ICD-10-CM

## 2019-09-06 DIAGNOSIS — Z23 NEED FOR SHINGLES VACCINE: ICD-10-CM

## 2019-09-06 DIAGNOSIS — Z23 FLU VACCINE NEED: ICD-10-CM

## 2019-09-06 DIAGNOSIS — J43.2 CENTRILOBULAR EMPHYSEMA: ICD-10-CM

## 2019-09-06 DIAGNOSIS — A31.0 MAI (MYCOBACTERIUM AVIUM-INTRACELLULARE): ICD-10-CM

## 2019-09-06 DIAGNOSIS — I70.0 ATHEROSCLEROSIS OF AORTA: ICD-10-CM

## 2019-09-06 DIAGNOSIS — J98.4 CAVITARY LUNG DISEASE: ICD-10-CM

## 2019-09-06 PROCEDURE — 99214 PR OFFICE/OUTPT VISIT, EST, LEVL IV, 30-39 MIN: ICD-10-PCS | Mod: S$GLB,,, | Performed by: INTERNAL MEDICINE

## 2019-09-06 PROCEDURE — 99499 UNLISTED E&M SERVICE: CPT | Mod: S$GLB,,, | Performed by: INTERNAL MEDICINE

## 2019-09-06 PROCEDURE — 99214 OFFICE O/P EST MOD 30 MIN: CPT | Mod: S$GLB,,, | Performed by: INTERNAL MEDICINE

## 2019-09-06 PROCEDURE — 99999 PR PBB SHADOW E&M-EST. PATIENT-LVL III: CPT | Mod: PBBFAC,,, | Performed by: INTERNAL MEDICINE

## 2019-09-06 PROCEDURE — 3074F SYST BP LT 130 MM HG: CPT | Mod: CPTII,S$GLB,, | Performed by: INTERNAL MEDICINE

## 2019-09-06 PROCEDURE — 3074F PR MOST RECENT SYSTOLIC BLOOD PRESSURE < 130 MM HG: ICD-10-PCS | Mod: CPTII,S$GLB,, | Performed by: INTERNAL MEDICINE

## 2019-09-06 PROCEDURE — 3078F PR MOST RECENT DIASTOLIC BLOOD PRESSURE < 80 MM HG: ICD-10-PCS | Mod: CPTII,S$GLB,, | Performed by: INTERNAL MEDICINE

## 2019-09-06 PROCEDURE — 1101F PT FALLS ASSESS-DOCD LE1/YR: CPT | Mod: CPTII,S$GLB,, | Performed by: INTERNAL MEDICINE

## 2019-09-06 PROCEDURE — 99499 RISK ADDL DX/OHS AUDIT: ICD-10-PCS | Mod: S$GLB,,, | Performed by: INTERNAL MEDICINE

## 2019-09-06 PROCEDURE — 99999 PR PBB SHADOW E&M-EST. PATIENT-LVL III: ICD-10-PCS | Mod: PBBFAC,,, | Performed by: INTERNAL MEDICINE

## 2019-09-06 PROCEDURE — 3078F DIAST BP <80 MM HG: CPT | Mod: CPTII,S$GLB,, | Performed by: INTERNAL MEDICINE

## 2019-09-06 PROCEDURE — 1101F PR PT FALLS ASSESS DOC 0-1 FALLS W/OUT INJ PAST YR: ICD-10-PCS | Mod: CPTII,S$GLB,, | Performed by: INTERNAL MEDICINE

## 2019-09-06 NOTE — PROGRESS NOTES
HISTORY OF PRESENT ILLNESS:  Diego uGnter is a 78 y.o. male who presents to the clinic today for Diabetes (f/u)  .   The patient presents to clinic today for follow-up of his type 2 diabetes mellitus complicated by neuropathy, hyperlipidemia, and emphysema.  He is followed by Pulmonary for his cavitary lung disease/HILLARY.  His respiratory status is stable.  He has a chronic cough.  He denies any problems with low blood sugars.  He states blood sugars and blood pressures at home are well controlled.  He checks them occasionally.  He denies cardiac chest pain. No significant changes in his weight.  He stays active.      PAST MEDICAL HISTORY:  Past Medical History:   Diagnosis Date    Dysphagia     Hx of colonic polyps     followed by GI. Dr. Pham    Hyperlipidemia LDL goal <100     Overweight(278.02)     Prostate cancer september 2011    followed by urology, Dr. Rogers    Type II or unspecified type diabetes mellitus without mention of complication, not stated as uncontrolled     diet controlled       PAST SURGICAL HISTORY:  Past Surgical History:   Procedure Laterality Date    BRONCHOSCOPY N/A 10/15/2018    [facility]    CATARACT EXTRACTION, BILATERAL  2014    ESOPHAGOGASTRODUODENOSCOPY (EGD) N/A 2/25/2016    Performed by Tohmas [facility] at [facility] ENDO    PROSTATECTOMY         SOCIAL HISTORY:  Social History     Socioeconomic History    Marital status:      Spouse name: Not on file    Number of children: 2    Years of education: Not on file    Highest education level: Not on file   Occupational History    Occupation: tool    Social Needs    Financial resource strain: Not on file    Food insecurity:     Worry: Not on file     Inability: Not on file    Transportation needs:     Medical: Not on file     Non-medical: Not on file   Tobacco Use    Smoking status: Former Smoker     Packs/day: 0.25     Years: 5.00     Pack years:  1.25     Last attempt to quit: 10/2/1962     Years since quittin.9    Smokeless tobacco: Never Used    Tobacco comment: quit age 23   Substance and Sexual Activity    Alcohol use: Yes     Comment: beer occasionally    Drug use: No    Sexual activity: Yes     Partners: Female   Lifestyle    Physical activity:     Days per week: Not on file     Minutes per session: Not on file    Stress: Not at all   Relationships    Social connections:     Talks on phone: Not on file     Gets together: Not on file     Attends Christian service: Not on file     Active member of club or organization: Not on file     Attends meetings of clubs or organizations: Not on file     Relationship status: Not on file   Other Topics Concern    Not on file   Social History Narrative    Not on file       FAMILY HISTORY:  Family History   Problem Relation Age of Onset    Colon cancer Mother     Diabetes Mother     Heart disease Father     Mental illness Sister     Diabetes Sister     Other Brother         polio as a child    Diabetes Unknown     Diabetes Brother     Myasthenia gravis Brother     Parkinsonism Brother     Diabetes Brother     Dementia Brother     Lung cancer Brother         smoker    Diabetes Maternal Aunt        ALLERGIES AND MEDICATIONS: updated and reviewed.  Review of patient's allergies indicates:  No Known Allergies  Medication List with Changes/Refills   Current Medications    ASCORBIC ACID (VITAMIN C) 1000 MG TABLET    Take 1,000 mg by mouth once daily.      OMEGA 3-DHA-EPA-FISH OIL 1,000 (120-180) MG CAP    Take 1 capsule by mouth once daily.      OMEPRAZOLE 20 MG TBEC    1/2 tablet daily in am as needed.    PRAVASTATIN (PRAVACHOL) 10 MG TABLET    Take 1 tablet (10 mg total) by mouth once daily.    PRAVASTATIN (PRAVACHOL) 10 MG TABLET              CARE TEAM:  Patient Care Team:  Portia Ferreira MD as PCP - General (Internal Medicine)  Dustin Mccarthy III, MD as Consulting Physician (Orthopedic  "Surgery)  Pierre Rogers MD as Consulting Physician (Urology)  Burak Gurrola MD as Consulting Physician (Pulmonary Disease)  Theo Alvares MD as Consulting Physician (Infectious Diseases)  Suzan Glass LPN as Licensed Practical Nurse  Gilberto Silva OD as Consulting Physician (Optometry)         REVIEW OF SYSTEMS:  Review of Systems   Constitutional: Negative for chills, fatigue, fever and unexpected weight change.   HENT: Negative for congestion, ear pain, sore throat and voice change.    Eyes: Negative for photophobia, pain, discharge and visual disturbance.   Respiratory: Positive for cough. Negative for shortness of breath and wheezing.    Cardiovascular: Negative for chest pain, palpitations and leg swelling.   Gastrointestinal: Negative for abdominal pain, blood in stool, constipation, diarrhea, nausea and vomiting.   Genitourinary: Negative for dysuria and frequency.   Musculoskeletal: Negative for gait problem, joint swelling and neck stiffness.   Skin: Negative for color change and rash.   Neurological: Negative for seizures, weakness and headaches.   Hematological: Negative for adenopathy. Does not bruise/bleed easily.   Psychiatric/Behavioral: Negative for behavioral problems and dysphoric mood. The patient is not nervous/anxious.          PHYSICAL EXAM:  Vitals:    09/06/19 0838   BP: 92/60   Pulse: (!) 57   Temp: 97.5 °F (36.4 °C)     Weight: 74.9 kg (165 lb 0.2 oz)   Height: 5' 7" (170.2 cm)   Body mass index is 25.84 kg/m².      General appearance - alert, well appearing, and in no distress, overweight  Mental status - alert, oriented to person, place, and time, normal mood, behavior, speech, dress, motor activity, and thought processes  Eyes - pupils equal and reactive, extraocular eye movements intact, sclera anicteric  Mouth - mucous membranes moist, pharynx normal without lesions  Neck - supple, no significant adenopathy, carotids upstroke normal bilaterally, no " bruits  Lymphatics - no palpable cervical lymphadenopathy  Chest - clear to auscultation, no wheezes, rales or rhonchi, symmetric air entry  Heart - normal rate and regular rhythm, no gallops noted  Neurological - alert, normal speech, no focal findings or movement disorder noted, cranial nerves II through XII intact  Musculoskeletal - no joint tenderness, deformity or swelling, no muscular tenderness noted  Extremities - peripheral pulses normal, no pedal edema, no clubbing or cyanosis  Skin - normal coloration and turgor, no rashes, no suspicious skin lesions noted      Protective Sensation (w/ 10 gram monofilament):  Right: Intact  Left: Intact    Visual Inspection:  Normal -  Bilateral    Pedal Pulses:   Right: Present  Left: Present    Posterior tibialis:   Right:Present  Left: Present         Labs:  Lab Results   Component Value Date    HGBA1C 5.6 08/29/2019    HGBA1C 5.6 02/14/2019    HGBA1C 5.5 09/14/2018      Lab Results   Component Value Date    CHOL 133 02/14/2019    CHOL 142 01/23/2018    CHOL 141 01/26/2017     Lab Results   Component Value Date    LDLCALC 74.6 02/14/2019    LDLCALC 80.4 01/23/2018    LDLCALC 71.6 01/26/2017          ASSESSMENT AND PLAN:  1. Controlled type 2 diabetes with neuropathy  Diabetes is under good control control at this time for age and comorbid conditions. We discussed diabetic diet and regular exercise. We discussed home blood sugar monitoring, if appropriate - the patient does not need to test daily but can test only as needed. Continue current medication regimen.  Diabetic complications addressed: Neuropathy pain controlled.  Patient was counseled on the need for yearly eye exam to screen for/monitor diabetic retinopathy and yearly diabetic foot exam.     2. Hyperlipidemia LDL goal <100  We discussed low fat diet and regular exercise.The current medical regimen is effective;  continue present plan and medications.      3. Atherosclerosis of aorta  Patient with  Atherosclerosis of the Aorta.  Stable/asymptomatic. Currently stable on lipid lowering medication and b/p monitoring.     4. Centrilobular emphysema/5. Cavitary lung disease/6. HILLARY (mycobacterium avium-intracellulare)  Stable. Followed by pulmonary.    7. Flu vaccine need  Not available today.  He will call in about 2 weeks to see if we have it in so it can be administered.    8. Need for shingles vaccine  Not available today.  He will call in about 2 weeks to see if we have it in so it can be administered.           Follow up in about 6 months (around 3/6/2020), or if symptoms worsen or fail to improve, for annual exam. or sooner as needed.

## 2019-09-16 ENCOUNTER — PATIENT OUTREACH (OUTPATIENT)
Dept: ADMINISTRATIVE | Facility: HOSPITAL | Age: 79
End: 2019-09-16

## 2019-09-17 ENCOUNTER — PATIENT MESSAGE (OUTPATIENT)
Dept: INFECTIOUS DISEASES | Facility: CLINIC | Age: 79
End: 2019-09-17

## 2019-09-17 ENCOUNTER — PATIENT MESSAGE (OUTPATIENT)
Dept: PULMONOLOGY | Facility: CLINIC | Age: 79
End: 2019-09-17

## 2019-10-02 DIAGNOSIS — E78.5 HYPERLIPIDEMIA LDL GOAL <100: ICD-10-CM

## 2019-10-02 RX ORDER — PRAVASTATIN SODIUM 10 MG/1
TABLET ORAL
Qty: 90 TABLET | Refills: 1 | Status: SHIPPED | OUTPATIENT
Start: 2019-10-02 | End: 2020-04-21

## 2019-10-03 ENCOUNTER — OFFICE VISIT (OUTPATIENT)
Dept: FAMILY MEDICINE | Facility: CLINIC | Age: 79
End: 2019-10-03
Payer: MEDICARE

## 2019-10-03 VITALS
HEIGHT: 67 IN | TEMPERATURE: 98 F | HEART RATE: 54 BPM | WEIGHT: 163.94 LBS | DIASTOLIC BLOOD PRESSURE: 60 MMHG | OXYGEN SATURATION: 96 % | BODY MASS INDEX: 25.73 KG/M2 | SYSTOLIC BLOOD PRESSURE: 100 MMHG

## 2019-10-03 DIAGNOSIS — I70.0 ATHEROSCLEROSIS OF AORTA: ICD-10-CM

## 2019-10-03 DIAGNOSIS — J43.2 CENTRILOBULAR EMPHYSEMA: ICD-10-CM

## 2019-10-03 DIAGNOSIS — L08.9 INFECTED ABRASION OF RIGHT FOREARM, INITIAL ENCOUNTER: Primary | ICD-10-CM

## 2019-10-03 DIAGNOSIS — R41.3 MEMORY LOSS: ICD-10-CM

## 2019-10-03 DIAGNOSIS — S50.811A INFECTED ABRASION OF RIGHT FOREARM, INITIAL ENCOUNTER: Primary | ICD-10-CM

## 2019-10-03 DIAGNOSIS — E11.40 CONTROLLED TYPE 2 DIABETES WITH NEUROPATHY: ICD-10-CM

## 2019-10-03 PROCEDURE — 3074F PR MOST RECENT SYSTOLIC BLOOD PRESSURE < 130 MM HG: ICD-10-PCS | Mod: CPTII,S$GLB,, | Performed by: NURSE PRACTITIONER

## 2019-10-03 PROCEDURE — 3074F SYST BP LT 130 MM HG: CPT | Mod: CPTII,S$GLB,, | Performed by: NURSE PRACTITIONER

## 2019-10-03 PROCEDURE — 99214 PR OFFICE/OUTPT VISIT, EST, LEVL IV, 30-39 MIN: ICD-10-PCS | Mod: S$GLB,,, | Performed by: NURSE PRACTITIONER

## 2019-10-03 PROCEDURE — 1101F PT FALLS ASSESS-DOCD LE1/YR: CPT | Mod: CPTII,S$GLB,, | Performed by: NURSE PRACTITIONER

## 2019-10-03 PROCEDURE — 3078F PR MOST RECENT DIASTOLIC BLOOD PRESSURE < 80 MM HG: ICD-10-PCS | Mod: CPTII,S$GLB,, | Performed by: NURSE PRACTITIONER

## 2019-10-03 PROCEDURE — 99999 PR PBB SHADOW E&M-EST. PATIENT-LVL IV: CPT | Mod: PBBFAC,,, | Performed by: NURSE PRACTITIONER

## 2019-10-03 PROCEDURE — 99214 OFFICE O/P EST MOD 30 MIN: CPT | Mod: S$GLB,,, | Performed by: NURSE PRACTITIONER

## 2019-10-03 PROCEDURE — 3078F DIAST BP <80 MM HG: CPT | Mod: CPTII,S$GLB,, | Performed by: NURSE PRACTITIONER

## 2019-10-03 PROCEDURE — 1101F PR PT FALLS ASSESS DOC 0-1 FALLS W/OUT INJ PAST YR: ICD-10-PCS | Mod: CPTII,S$GLB,, | Performed by: NURSE PRACTITIONER

## 2019-10-03 PROCEDURE — 99999 PR PBB SHADOW E&M-EST. PATIENT-LVL IV: ICD-10-PCS | Mod: PBBFAC,,, | Performed by: NURSE PRACTITIONER

## 2019-10-03 RX ORDER — MUPIROCIN 20 MG/G
OINTMENT TOPICAL 2 TIMES DAILY
Qty: 30 G | Refills: 0 | Status: SHIPPED | OUTPATIENT
Start: 2019-10-03 | End: 2019-10-13

## 2019-10-03 RX ORDER — VITAMIN B COMPLEX
1 CAPSULE ORAL DAILY
COMMUNITY
Start: 2019-10-03

## 2019-10-03 RX ORDER — SULFAMETHOXAZOLE AND TRIMETHOPRIM 800; 160 MG/1; MG/1
1 TABLET ORAL 2 TIMES DAILY
Qty: 20 TABLET | Refills: 0 | Status: SHIPPED | OUTPATIENT
Start: 2019-10-03 | End: 2019-10-13

## 2019-10-03 NOTE — PROGRESS NOTES
Subjective:       Patient ID: Diego Gunter is a 79 y.o. male.    Chief Complaint: Laceration (RIGHT FOREAM  6 Days)    79-year-old male presents to the clinic today with complaint of an abrasion to right arm x6 days.  He he states he think he scraped his arm on the [place].  He has been applying triple antibiotic ointment and is not getting any better.  He says now he has surrounding redness to the site.  He denies any fever or chills.  He denies any drainage at the abrasion site.  He has well-controlled diabetes.  He denies any other complaints. He is also having trouble with his memory and like to see a neurologist.    Past Medical History:   Diagnosis Date    Dysphagia     Hx of colonic polyps     followed by GI. Dr. Pham    Hyperlipidemia LDL goal <100     Overweight(278.02)     Prostate cancer september 2011    followed by urology, Dr. Rogers    Type II or unspecified type diabetes mellitus without mention of complication, not stated as uncontrolled     diet controlled     Past Surgical History:   Procedure Laterality Date    BRONCHOSCOPY N/A 10/15/2018    Procedure: BRONCHOSCOPY;  Surgeon: [facility] Diagnostic Provider;  Location: [facility] OR [address] [place] [address];  Service: Anesthesiology;  Laterality: N/A;    CATARACT EXTRACTION, BILATERAL  2014    PROSTATECTOMY        reports that he quit smoking about 57 years ago. He has a 1.25 pack-year smoking history. He has never used smokeless tobacco. He reports that he drinks alcohol. He reports that he does not use drugs.  Review of Systems   Respiratory: Negative for cough, shortness of breath and wheezing.    Cardiovascular: Negative for chest pain, palpitations and leg swelling.   Gastrointestinal: Negative for abdominal pain, blood in stool, constipation, diarrhea, nausea and vomiting.   Musculoskeletal: Negative for gait problem.   Skin: Positive for wound. Negative for rash.   Neurological: Negative for dizziness, light-headedness and headaches.       Objective:       Physical Exam   Constitutional: He is oriented to person, place, and time. He appears well-developed and well-nourished. No distress.   Eyes: Pupils are equal, round, and reactive to light. Conjunctivae and EOM are normal. Right eye exhibits no discharge. Left eye exhibits no discharge. No scleral icterus.   Neck: Normal range of motion. Neck supple. No JVD present.   Cardiovascular: Normal rate, regular rhythm and normal heart sounds.   No murmur heard.  Pulmonary/Chest: Effort normal and breath sounds normal. No respiratory distress. He has no wheezes. He has no rales.   Abdominal: Soft. Bowel sounds are normal. There is no tenderness.   Musculoskeletal: Normal range of motion. He exhibits no edema.   Neurological: He is alert and oriented to person, place, and time.   Skin: He is not diaphoretic.   Abrasion right arm with surrounding erythema no drainage see photo   Psychiatric: He has a normal mood and affect.         Assessment:       1. Infected abrasion of right forearm, initial encounter    2. Atherosclerosis of aorta    3. Centrilobular emphysema    4. Controlled type 2 diabetes with neuropathy    5. Memory loss        Plan:         Infected abrasion of right forearm, initial encounter  -     sulfamethoxazole-trimethoprim 800-160mg (BACTRIM DS) 800-160 mg Tab; Take 1 tablet by mouth 2 (two) times daily. for 10 days  Dispense: 20 tablet; Refill: 0  -     mupirocin (BACTROBAN) 2 % ointment; Apply topically 2 (two) times daily. for 10 days  Dispense: 30 g; Refill: 0    Atherosclerosis of aorta  - Stable / Asymptomatic is  cholesterol lowering medications and monitor blood pressure closely    Centrilobular emphysema  - stable observe asymptomatic   - noted on CT of chest 9/2018    Controlled type 2 diabetes with neuropathy  - diet controlled     Memory loss  -     Ambulatory referral to Neurology

## 2019-10-03 NOTE — PATIENT INSTRUCTIONS
Clean abrasion twice a day and then apply Bactroban ointment  Bactrim twice a day x 10 days  Follow up with Dr. Ferreira 3/2020  Referral to neurology

## 2019-10-14 ENCOUNTER — OFFICE VISIT (OUTPATIENT)
Dept: NEUROLOGY | Facility: CLINIC | Age: 79
End: 2019-10-14
Payer: MEDICARE

## 2019-10-14 VITALS
RESPIRATION RATE: 18 BRPM | WEIGHT: 163.81 LBS | BODY MASS INDEX: 25.71 KG/M2 | HEIGHT: 67 IN | SYSTOLIC BLOOD PRESSURE: 104 MMHG | HEART RATE: 60 BPM | DIASTOLIC BLOOD PRESSURE: 65 MMHG

## 2019-10-14 DIAGNOSIS — R41.3 MEMORY PROBLEM: Primary | ICD-10-CM

## 2019-10-14 PROCEDURE — 99204 PR OFFICE/OUTPT VISIT, NEW, LEVL IV, 45-59 MIN: ICD-10-PCS | Mod: S$GLB,,, | Performed by: PSYCHIATRY & NEUROLOGY

## 2019-10-14 PROCEDURE — 3078F PR MOST RECENT DIASTOLIC BLOOD PRESSURE < 80 MM HG: ICD-10-PCS | Mod: CPTII,S$GLB,, | Performed by: PSYCHIATRY & NEUROLOGY

## 2019-10-14 PROCEDURE — 99999 PR PBB SHADOW E&M-EST. PATIENT-LVL III: ICD-10-PCS | Mod: PBBFAC,,, | Performed by: PSYCHIATRY & NEUROLOGY

## 2019-10-14 PROCEDURE — 3074F SYST BP LT 130 MM HG: CPT | Mod: CPTII,S$GLB,, | Performed by: PSYCHIATRY & NEUROLOGY

## 2019-10-14 PROCEDURE — 3074F PR MOST RECENT SYSTOLIC BLOOD PRESSURE < 130 MM HG: ICD-10-PCS | Mod: CPTII,S$GLB,, | Performed by: PSYCHIATRY & NEUROLOGY

## 2019-10-14 PROCEDURE — 99204 OFFICE O/P NEW MOD 45 MIN: CPT | Mod: S$GLB,,, | Performed by: PSYCHIATRY & NEUROLOGY

## 2019-10-14 PROCEDURE — 1101F PT FALLS ASSESS-DOCD LE1/YR: CPT | Mod: CPTII,S$GLB,, | Performed by: PSYCHIATRY & NEUROLOGY

## 2019-10-14 PROCEDURE — 99999 PR PBB SHADOW E&M-EST. PATIENT-LVL III: CPT | Mod: PBBFAC,,, | Performed by: PSYCHIATRY & NEUROLOGY

## 2019-10-14 PROCEDURE — 1101F PR PT FALLS ASSESS DOC 0-1 FALLS W/OUT INJ PAST YR: ICD-10-PCS | Mod: CPTII,S$GLB,, | Performed by: PSYCHIATRY & NEUROLOGY

## 2019-10-14 PROCEDURE — 3078F DIAST BP <80 MM HG: CPT | Mod: CPTII,S$GLB,, | Performed by: PSYCHIATRY & NEUROLOGY

## 2019-10-14 NOTE — PROGRESS NOTES
Neurology Consult Note    Chief Complaint: memory problem    HPI:   Diego Gunter is a 79 y.o. male with medical conditions as outlined below who presents for further evaluation of problems with memory. He is accompanied to the visit by his wife who aided in giving history. She states he has had slowly progressive problems with memory over the past 6 months. He is able to perform all ADLs on his own, but forgets names from time to time. He states it takes longer to think of words recently. Wife denies him talking about things that are not happening or becoming aggressive. She denies him having a change in personality. He denies recent falls or bowel or bladder problems. He has a history of dysphagia, but he states that has improved and he has had no recent difficulty with swallowing. He works out at the gym 5 times a week. He has a brother with Parkinson's disease. He feels he is a little bit slower, but denies tremor or unsteady gait. He denies feeling depressed. He has no further complaints.    Past Medical History:  Past Medical History:   Diagnosis Date    Dysphagia     Hx of colonic polyps     followed by GI. Dr. Pham    Hyperlipidemia LDL goal <100     Overweight(278.02)     Prostate cancer september 2011    followed by urology, Dr. Rogers    Type II or unspecified type diabetes mellitus without mention of complication, not stated as uncontrolled     diet controlled       Past Surgical History:  Past Surgical History:   Procedure Laterality Date    BRONCHOSCOPY N/A 10/15/2018    Procedure: BRONCHOSCOPY;  Surgeon: Pratibha Diagnostic Provider;  Location: [facility] OR [address] [place] [address];  Service: Anesthesiology;  Laterality: N/A;    CATARACT EXTRACTION, BILATERAL  2014    PROSTATECTOMY         Social History:  Social History     Socioeconomic History    Marital status:      Spouse name: Not on file    Number of children: 2    Years of education: Not on file    Highest education level: Not on file    Occupational History    Occupation: tool    Social Needs    Financial resource strain: Not on file    Food insecurity:     Worry: Not on file     Inability: Not on file    Transportation needs:     Medical: Not on file     Non-medical: Not on file   Tobacco Use    Smoking status: Former Smoker     Packs/day: 0.25     Years: 5.00     Pack years: 1.25     Last attempt to quit: 10/2/1962     Years since quittin.0    Smokeless tobacco: Never Used    Tobacco comment: quit age 23   Substance and Sexual Activity    Alcohol use: Yes     Comment: beer occasionally    Drug use: No    Sexual activity: Yes     Partners: Female   Lifestyle    Physical activity:     Days per week: Not on file     Minutes per session: Not on file    Stress: Not at all   Relationships    Social connections:     Talks on phone: Not on file     Gets together: Not on file     Attends Worship service: Not on file     Active member of club or organization: Not on file     Attends meetings of clubs or organizations: Not on file     Relationship status: Not on file   Other Topics Concern    Not on file   Social History Narrative    Not on file       Family History:  Family History   Problem Relation Age of Onset    Colon cancer Mother     Diabetes Mother     Heart disease Father     Mental illness Sister     Diabetes Sister     Other Brother         polio as a child    Diabetes Unknown     Diabetes Brother     Myasthenia gravis Brother     Parkinsonism Brother     Diabetes Brother     Dementia Brother     Lung cancer Brother         smoker    Diabetes Maternal Aunt        Medications:  Current Outpatient Medications   Medication Sig Dispense Refill    ascorbic acid (VITAMIN C) 1000 MG tablet Take 1,000 mg by mouth once daily.        b complex vitamins capsule Take 1 capsule by mouth once daily.      FLUAD 1284-2572, 65 YR UP,,PF, 45 mcg (15 mcg x 3)/0.5 mL Syrg       omega 3-dha-epa-fish oil 1,000  (120-180) mg Cap Take 1 capsule by mouth once daily.        omeprazole 20 mg TbEC 1/2 tablet daily in am as needed. 45 each 0    pravastatin (PRAVACHOL) 10 MG tablet TAKE 1 TABLET BY MOUTH ONCE DAILY 90 tablet 1     No current facility-administered medications for this visit.        Allergies:  Review of patient's allergies indicates:  No Known Allergies    ROS:  A 12 point review of system was negative aside from pertinent positives and negatives as outlined above.    Physical Exam  Vitals:    10/14/19 1435   BP: 104/65   Pulse: 60   Resp: 18       General: well nourished, well developed  Eyes: no scleral icterus   Nose: nasal turbinates intact  Neck: supple, ROM intact  Skin: no rash or ecchymosis  Joints: no swelling or erythema  Cardiac: regular rate and rhythm  Lungs: clear to auscultation bilaterally    Neuro:  Mental status: AAO x 3, no dysarthria, no aphasia, communicating appropriately, MMSE 25/30  CN: PERRL, EOMI, VFF, V1-V3 sensation intact, no facial asymmetry, hearing grossly intact, tongue midline  Motor:   RUE 5/5  RLE 5/5  LUE 5/5  LLE 5/5    Normal bulk and tone  No cogwheel rigidity bilaterally    Reflexes: 1+ throughout, toes equivocal bilaterally  Sensory: intact to light touch throughout  Coordination: no dysmetria on FTN  Gait: slow, but steady, equal arm swing bilaterally    Prior Imaging/Labs:  Reviewed      Assessment and Plan:    79 y.o. male with slowly progressive problems with memory over the past 6 months likely 2/2 mild cognitive impairment. Patient is able to perform all ADLs on his own. Will obtain MRI brain as pt has hx of prostate cancer.    1. Memory problem  - MRI Brain W WO Contrast; Future  - Creatinine, serum; Future  - Vitamin B12; Future  - Methylmalonic acid, serum; Future  - TSH; Future  - RPR; Future  - Ambulatory Referral to Neuropsychology            Patient and wife were advised to notify me for worsening symptoms. I will see patient back in 1 month or sooner if  necessary.     Thank you for this consultation.    Zahraa Elizabeth DO  [facility]  [address] [facility] [address]  [place] [address] [phone]

## 2019-10-14 NOTE — LETTER
October 14, 2019      Debi Navarrete, FNP-C  [address] [facility] [address]           [facility]  [address]  [facility] [address]  Phone: [phone]  Fax: [phone]          Patient: Diego Gunter   MR Number: [id number]   YOB: 1940   Date of Visit: 10/14/2019       Dear Debi Navarrete:    Thank you for referring Diego Gunter to me for evaluation. Attached you will find relevant portions of my assessment and plan of care.    If you have questions, please do not hesitate to call me. I look forward to following Diego Gunter along with you.    Sincerely,    Zahraa Elizabeth, DO    Enclosure  CC:  No Recipients    If you would like to receive this communication electronically, please contact [email or web address] or [phone] to request more information on [facility] access.    For providers and/or their staff who would like to refer a patient to [facility], please contact us through our one-stop-shop provider referral line, [facility], at [phone].    If you feel you have received this communication in error or would no longer like to receive these types of communications, please e-mail [email or web address]

## 2019-10-17 ENCOUNTER — TELEPHONE (OUTPATIENT)
Dept: NEUROLOGY | Facility: CLINIC | Age: 79
End: 2019-10-17

## 2019-10-18 ENCOUNTER — TELEPHONE (OUTPATIENT)
Dept: NEUROLOGY | Facility: CLINIC | Age: 79
End: 2019-10-18

## 2019-10-18 NOTE — TELEPHONE ENCOUNTER
Spoke to patient, assured him his MRI is approved     ----- Message from Rosemarie Colbert sent at 10/18/2019 10:26 AM CDT -----  Contact: Self   Type: Patient Call Back    What is the request in detail: Pt calling to speak to a nurse regarding scheduling his scan.     Can the clinic reply by [facility]? No    Would the patient rather a call back or a response via [facility]? Call back     Best call back number: [phone]

## 2019-10-18 NOTE — TELEPHONE ENCOUNTER
----- Message from Micah Person MA sent at 10/17/2019  4:40 PM CDT -----  Appointment is needed for the above patient. Can you give the patient a call to set up an appointment per Dr. Murray.      KRISTINE Obrien  Neurology [facility]   P#[phone]

## 2019-10-21 ENCOUNTER — HOSPITAL ENCOUNTER (OUTPATIENT)
Dept: RADIOLOGY | Facility: HOSPITAL | Age: 79
Discharge: HOME | End: 2019-10-21
Attending: PSYCHIATRY & NEUROLOGY
Payer: MEDICARE

## 2019-10-21 DIAGNOSIS — R41.3 MEMORY PROBLEM: ICD-10-CM

## 2019-10-21 PROCEDURE — 70553 MRI BRAIN STEM W/O & W/DYE: CPT | Mod: 26,,, | Performed by: RADIOLOGY

## 2019-10-21 PROCEDURE — 25500020 PHARM REV CODE 255: Performed by: PSYCHIATRY & NEUROLOGY

## 2019-10-21 PROCEDURE — 70553 MRI BRAIN W WO CONTRAST: ICD-10-PCS | Mod: 26,,, | Performed by: RADIOLOGY

## 2019-10-21 PROCEDURE — A9585 GADOBUTROL INJECTION: HCPCS | Performed by: PSYCHIATRY & NEUROLOGY

## 2019-10-21 PROCEDURE — 70553 MRI BRAIN STEM W/O & W/DYE: CPT | Mod: TC

## 2019-10-21 RX ORDER — GADOBUTROL 604.72 MG/ML
7.5 INJECTION INTRAVENOUS
Status: COMPLETED | OUTPATIENT
Start: 2019-10-21 | End: 2019-10-21

## 2019-10-21 RX ADMIN — GADOBUTROL 7.5 ML: 604.72 INJECTION INTRAVENOUS at 08:10

## 2019-11-14 ENCOUNTER — OFFICE VISIT (OUTPATIENT)
Dept: NEUROLOGY | Facility: CLINIC | Age: 79
End: 2019-11-14
Payer: MEDICARE

## 2019-11-14 VITALS
HEART RATE: 50 BPM | DIASTOLIC BLOOD PRESSURE: 68 MMHG | HEIGHT: 67 IN | WEIGHT: 163 LBS | SYSTOLIC BLOOD PRESSURE: 134 MMHG | BODY MASS INDEX: 25.58 KG/M2

## 2019-11-14 DIAGNOSIS — Z86.73 HISTORY OF STROKE: Primary | ICD-10-CM

## 2019-11-14 DIAGNOSIS — G31.84 MILD COGNITIVE IMPAIRMENT: ICD-10-CM

## 2019-11-14 DIAGNOSIS — I67.82 CEREBRAL ISCHEMIA: ICD-10-CM

## 2019-11-14 PROCEDURE — 3078F DIAST BP <80 MM HG: CPT | Mod: CPTII,S$GLB,, | Performed by: PSYCHIATRY & NEUROLOGY

## 2019-11-14 PROCEDURE — 99999 PR PBB SHADOW E&M-EST. PATIENT-LVL III: CPT | Mod: PBBFAC,,, | Performed by: PSYCHIATRY & NEUROLOGY

## 2019-11-14 PROCEDURE — 1101F PT FALLS ASSESS-DOCD LE1/YR: CPT | Mod: CPTII,S$GLB,, | Performed by: PSYCHIATRY & NEUROLOGY

## 2019-11-14 PROCEDURE — 99214 OFFICE O/P EST MOD 30 MIN: CPT | Mod: S$GLB,,, | Performed by: PSYCHIATRY & NEUROLOGY

## 2019-11-14 PROCEDURE — 99999 PR PBB SHADOW E&M-EST. PATIENT-LVL III: ICD-10-PCS | Mod: PBBFAC,,, | Performed by: PSYCHIATRY & NEUROLOGY

## 2019-11-14 PROCEDURE — 3078F PR MOST RECENT DIASTOLIC BLOOD PRESSURE < 80 MM HG: ICD-10-PCS | Mod: CPTII,S$GLB,, | Performed by: PSYCHIATRY & NEUROLOGY

## 2019-11-14 PROCEDURE — 3075F SYST BP GE 130 - 139MM HG: CPT | Mod: CPTII,S$GLB,, | Performed by: PSYCHIATRY & NEUROLOGY

## 2019-11-14 PROCEDURE — 1101F PR PT FALLS ASSESS DOC 0-1 FALLS W/OUT INJ PAST YR: ICD-10-PCS | Mod: CPTII,S$GLB,, | Performed by: PSYCHIATRY & NEUROLOGY

## 2019-11-14 PROCEDURE — 99214 PR OFFICE/OUTPT VISIT, EST, LEVL IV, 30-39 MIN: ICD-10-PCS | Mod: S$GLB,,, | Performed by: PSYCHIATRY & NEUROLOGY

## 2019-11-14 PROCEDURE — 3075F PR MOST RECENT SYSTOLIC BLOOD PRESS GE 130-139MM HG: ICD-10-PCS | Mod: CPTII,S$GLB,, | Performed by: PSYCHIATRY & NEUROLOGY

## 2019-11-14 NOTE — PROGRESS NOTES
Neurology Follow Up Note    Chief Complaint: follow up for memory problem    Interval History:  Since last visit, patient's memory has remained stable. His wife states he is able to dress and feed himself as well as hold regular conversation. He remembers to take his medication on his own. He has had no recent falls. He denies bowel or bladder problems. She denies him talking about things that are not happening. He had an MRI brain which showed a remote left frontal parietal convexity infarct. Patient denies having symptoms of a stroke in the past. He is on a statin. He does not take aspirin. He denies problems with bleeding. He has no further complaints.       Initial Visit 10/14/19:  Diego Gunter is a 79 y.o. male with medical conditions as outlined below who presents for further evaluation of problems with memory. He is accompanied to the visit by his wife who aided in giving history. She states he has had slowly progressive problems with memory over the past 6 months. He is able to perform all ADLs on his own, but forgets names from time to time. He states it takes longer to think of words recently. Wife denies him talking about things that are not happening or becoming aggressive. She denies him having a change in personality. He denies recent falls or bowel or bladder problems. He has a history of dysphagia, but he states that has improved and he has had no recent difficulty with swallowing. He works out at the gym 5 times a week. He has a brother with Parkinson's disease. He feels he is a little bit slower, but denies tremor or unsteady gait. He denies feeling depressed. He has no further complaints.    Past Medical History:  Past Medical History:   Diagnosis Date    Dysphagia     Hx of colonic polyps     followed by GI. Dr. Pham    Hyperlipidemia LDL goal <100     Overweight(278.02)     Prostate cancer september 2011    followed by urology, Dr. Rogers    Type II or unspecified type diabetes  mellitus without mention of complication, not stated as uncontrolled     diet controlled       Past Surgical History:  Past Surgical History:   Procedure Laterality Date    BRONCHOSCOPY N/A 10/15/2018    Procedure: BRONCHOSCOPY;  Surgeon: Pratibha Diagnostic Provider;  Location: [facility] OR [address] [place] [address];  Service: Anesthesiology;  Laterality: N/A;    CATARACT EXTRACTION, BILATERAL  2014    PROSTATECTOMY         Social History:  Social History     Socioeconomic History    Marital status:      Spouse name: Not on file    Number of children: 2    Years of education: Not on file    Highest education level: Not on file   Occupational History    Occupation: tool    Social Needs    Financial resource strain: Not on file    Food insecurity:     Worry: Not on file     Inability: Not on file    Transportation needs:     Medical: Not on file     Non-medical: Not on file   Tobacco Use    Smoking status: Former Smoker     Packs/day: 0.25     Years: 5.00     Pack years: 1.25     Last attempt to quit: 10/2/1962     Years since quittin.1    Smokeless tobacco: Never Used    Tobacco comment: quit age 23   Substance and Sexual Activity    Alcohol use: Yes     Comment: beer occasionally    Drug use: No    Sexual activity: Yes     Partners: Female   Lifestyle    Physical activity:     Days per week: Not on file     Minutes per session: Not on file    Stress: Not at all   Relationships    Social connections:     Talks on phone: Not on file     Gets together: Not on file     Attends Mosque service: Not on file     Active member of club or organization: Not on file     Attends meetings of clubs or organizations: Not on file     Relationship status: Not on file   Other Topics Concern    Not on file   Social History Narrative    Not on file       Family History:  Family History   Problem Relation Age of Onset    Colon cancer Mother     Diabetes Mother     Heart disease Father     Mental illness Sister      Diabetes Sister     Other Brother         polio as a child    Diabetes Unknown     Diabetes Brother     Myasthenia gravis Brother     Parkinsonism Brother     Diabetes Brother     Dementia Brother     Lung cancer Brother         smoker    Diabetes Maternal Aunt        Medications:  Current Outpatient Medications   Medication Sig Dispense Refill    ascorbic acid (VITAMIN C) 1000 MG tablet Take 1,000 mg by mouth once daily.        b complex vitamins capsule Take 1 capsule by mouth once daily.      FLUAD [id number], 65 YR UP,,PF, 45 mcg (15 mcg x 3)/0.5 mL Syrg       omega 3-dha-epa-fish oil 1,000 (120-180) mg Cap Take 1 capsule by mouth once daily.        omeprazole 20 mg TbEC 1/2 tablet daily in am as needed. 45 each 0    pravastatin (PRAVACHOL) 10 MG tablet TAKE 1 TABLET BY MOUTH ONCE DAILY 90 tablet 1     No current facility-administered medications for this visit.        Allergies:  Review of patient's allergies indicates:  No Known Allergies    ROS:  A 12 point review of system was negative aside from pertinent positives and negatives as outlined above.    Physical Exam  Vitals:    11/14/19 0755   BP: 134/68   Pulse: (!) 50       General: well nourished, well developed  Eyes: no scleral icterus   Nose: nasal turbinates intact  Neck: supple, ROM intact  Skin: no rash or ecchymosis  Joints: no swelling or erythema  Cardiac: regular rate and rhythm  Lungs: clear to auscultation bilaterally    Neuro:  Mental status: AAO x 3, no dysarthria, no aphasia, communicating appropriately MMSE 24/30  CN: PERRL, EOMI, VFF, V1-V3 sensation intact, no facial asymmetry, hearing grossly intact, tongue midline  Motor:   RUE 5/5  RLE 5/5  LUE 5/5  LLE 5/5    Normal bulk and tone    Reflexes: 1+ throughout, toes equivocal bilaterally  Sensory: intact to light touch throughout  Coordination: no dysmetria on FTN  Gait: steady    Prior Imaging/Labs:  Reviewed      Assessment and Plan:    79 y.o. male with slowly  progressive problems with memory likely 2/2 mild cognitive impairment. Patient able to perform all ADLs on his own.    1. Remote infarct on MRI brain  - Radiology US Carotid Bilateral; Future  Start aspirin 81mg daily  C/w statin  Patient and wife were made aware of the importance of good glucose, cholesterol and blood pressure control for stroke prevention  Patient was advised to follow up with his cardiologist, Dr. Lundberg an notify him he had a stroke in the past so further cardiac workup can be pursued    2. Mild cognitive impairment  stable            Patient and wife were advised to notify me for worsening symptoms. I will see patient back in 4 months or sooner if necessary.         Zahraa Elizabeth DO  [facility]  [address] [facility]  [place] [id number] [phone]

## 2019-11-21 ENCOUNTER — HOSPITAL ENCOUNTER (OUTPATIENT)
Dept: RADIOLOGY | Facility: HOSPITAL | Age: 79
Discharge: HOME | End: 2019-11-21
Attending: PSYCHIATRY & NEUROLOGY
Payer: MEDICARE

## 2019-11-21 DIAGNOSIS — I67.82 CEREBRAL ISCHEMIA: ICD-10-CM

## 2019-11-21 DIAGNOSIS — Z86.73 HISTORY OF STROKE: ICD-10-CM

## 2019-11-21 PROCEDURE — 93880 US CAROTID BILATERAL: ICD-10-PCS | Mod: 26,,, | Performed by: RADIOLOGY

## 2019-11-21 PROCEDURE — 93880 EXTRACRANIAL BILAT STUDY: CPT | Mod: 26,,, | Performed by: RADIOLOGY

## 2019-11-21 PROCEDURE — 93880 EXTRACRANIAL BILAT STUDY: CPT | Mod: TC

## 2019-12-05 ENCOUNTER — OFFICE VISIT (OUTPATIENT)
Dept: CARDIOLOGY | Facility: CLINIC | Age: 79
End: 2019-12-05
Payer: MEDICARE

## 2019-12-05 VITALS
HEART RATE: 48 BPM | WEIGHT: 165.38 LBS | DIASTOLIC BLOOD PRESSURE: 64 MMHG | OXYGEN SATURATION: 98 % | SYSTOLIC BLOOD PRESSURE: 120 MMHG | BODY MASS INDEX: 25.96 KG/M2 | HEIGHT: 67 IN

## 2019-12-05 DIAGNOSIS — E78.5 HYPERLIPIDEMIA LDL GOAL <100: ICD-10-CM

## 2019-12-05 DIAGNOSIS — I10 HYPERTENSION: ICD-10-CM

## 2019-12-05 DIAGNOSIS — J98.4 CAVITARY LUNG DISEASE: Primary | ICD-10-CM

## 2019-12-05 PROCEDURE — 1101F PT FALLS ASSESS-DOCD LE1/YR: CPT | Mod: CPTII,S$GLB,, | Performed by: INTERNAL MEDICINE

## 2019-12-05 PROCEDURE — 99999 PR PBB SHADOW E&M-EST. PATIENT-LVL III: ICD-10-PCS | Mod: PBBFAC,,, | Performed by: INTERNAL MEDICINE

## 2019-12-05 PROCEDURE — 3074F SYST BP LT 130 MM HG: CPT | Mod: CPTII,S$GLB,, | Performed by: INTERNAL MEDICINE

## 2019-12-05 PROCEDURE — 1126F AMNT PAIN NOTED NONE PRSNT: CPT | Mod: S$GLB,,, | Performed by: INTERNAL MEDICINE

## 2019-12-05 PROCEDURE — 3078F PR MOST RECENT DIASTOLIC BLOOD PRESSURE < 80 MM HG: ICD-10-PCS | Mod: CPTII,S$GLB,, | Performed by: INTERNAL MEDICINE

## 2019-12-05 PROCEDURE — 99499 UNLISTED E&M SERVICE: CPT | Mod: S$GLB,,, | Performed by: INTERNAL MEDICINE

## 2019-12-05 PROCEDURE — 93000 ELECTROCARDIOGRAM COMPLETE: CPT | Mod: S$GLB,,, | Performed by: INTERNAL MEDICINE

## 2019-12-05 PROCEDURE — 93000 EKG 12-LEAD: ICD-10-PCS | Mod: S$GLB,,, | Performed by: INTERNAL MEDICINE

## 2019-12-05 PROCEDURE — 1101F PR PT FALLS ASSESS DOC 0-1 FALLS W/OUT INJ PAST YR: ICD-10-PCS | Mod: CPTII,S$GLB,, | Performed by: INTERNAL MEDICINE

## 2019-12-05 PROCEDURE — 1159F PR MEDICATION LIST DOCUMENTED IN MEDICAL RECORD: ICD-10-PCS | Mod: S$GLB,,, | Performed by: INTERNAL MEDICINE

## 2019-12-05 PROCEDURE — 99999 PR PBB SHADOW E&M-EST. PATIENT-LVL III: CPT | Mod: PBBFAC,,, | Performed by: INTERNAL MEDICINE

## 2019-12-05 PROCEDURE — 99214 PR OFFICE/OUTPT VISIT, EST, LEVL IV, 30-39 MIN: ICD-10-PCS | Mod: S$GLB,,, | Performed by: INTERNAL MEDICINE

## 2019-12-05 PROCEDURE — 1159F MED LIST DOCD IN RCRD: CPT | Mod: S$GLB,,, | Performed by: INTERNAL MEDICINE

## 2019-12-05 PROCEDURE — 1126F PR PAIN SEVERITY QUANTIFIED, NO PAIN PRESENT: ICD-10-PCS | Mod: S$GLB,,, | Performed by: INTERNAL MEDICINE

## 2019-12-05 PROCEDURE — 99499 RISK ADDL DX/OHS AUDIT: ICD-10-PCS | Mod: S$GLB,,, | Performed by: INTERNAL MEDICINE

## 2019-12-05 PROCEDURE — 3078F DIAST BP <80 MM HG: CPT | Mod: CPTII,S$GLB,, | Performed by: INTERNAL MEDICINE

## 2019-12-05 PROCEDURE — 99214 OFFICE O/P EST MOD 30 MIN: CPT | Mod: S$GLB,,, | Performed by: INTERNAL MEDICINE

## 2019-12-05 PROCEDURE — 3074F PR MOST RECENT SYSTOLIC BLOOD PRESSURE < 130 MM HG: ICD-10-PCS | Mod: CPTII,S$GLB,, | Performed by: INTERNAL MEDICINE

## 2019-12-05 NOTE — PROGRESS NOTES
Subjective:    Patient ID:  Diego Gunter is a 79 y.o. male who presents for follow-up of Hyperlipidemia      HPI     HTN, DM, HLD, bradycardia     Referred by Dr Ferreira  Diego Gunter is a 76 y.o. male who presents to the clinic today for Diabetes  .  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  He does not check his blood pressures or blood sugars at home.  He reports good dietary habits.  He exercises regularly.  Unfortunately, his dysphagia is worsening again.  He has an appointment set up in the near future for reevaluation with speech therapy.  He recently had blood work done and is here today to discuss the results.  He does complain of some shortness of breath and fatigue.  He denies cardiac chest pain.  No abdominal pain, temperature intolerance, or unexplained changes in his weight.     Denies CP but gets SOB which has worsened some  SOB is at rest - goes to the gym regularly and does cardio without symptoms  No recent cardiac evaluation     Stress echo 8/31/17    1 - Normal left ventricular systolic function (EF 55-60%).     2 - Trivial tricuspid regurgitation.     3 - Mild pulmonic regurgitation.     No evidence of stress induced myocardial ischemia.     Carotid US 11/21/19  1. No significant right or left carotid circulation atheromatous disease and no visual analysis are Doppler findings to suggest significant hemodynamic stenosis.  2. Bilateral antegrade vertebral flow noted.     3/13/18 Mild dizziness if he stands too quickly  Mild KO  Denies CP  EKG NSR - ok    12/5/19 Seeing neurology for memory issues  MRI with remote infarct  Denies CP or SOB  EKG NSR - ok  BP well controlled    Review of Systems   Constitution: Negative for decreased appetite.   HENT: Negative for ear discharge.    Eyes: Negative for blurred vision.   Endocrine: Negative for polyphagia.   Skin: Negative for nail changes.    Genitourinary: Negative for bladder incontinence.   Neurological: Negative for aphonia.   Psychiatric/Behavioral: Negative for hallucinations.   Allergic/Immunologic: Negative for hives.        Objective:    Physical Exam   Constitutional: He is oriented to person, place, and time. He appears well-developed and well-nourished.   HENT:   Head: Normocephalic and atraumatic.   Eyes: Pupils are equal, round, and reactive to light. Conjunctivae are normal.   Neck: Normal range of motion. Neck supple.   Cardiovascular: Normal rate, normal heart sounds and intact distal pulses.   Pulmonary/Chest: Effort normal and breath sounds normal.   Abdominal: Soft. Bowel sounds are normal.   Musculoskeletal: Normal range of motion.   Neurological: He is alert and oriented to person, place, and time.   Skin: Skin is warm and dry.         Assessment:       1. Cavitary lung disease    2. Hyperlipidemia LDL goal <100         Plan:       Echo with bubble study to r/o PFO  Clinically no evidence of PAF  Agree with ASA/statin

## 2019-12-12 ENCOUNTER — HOSPITAL ENCOUNTER (OUTPATIENT)
Dept: CARDIOLOGY | Facility: HOSPITAL | Age: 79
Discharge: HOME | End: 2019-12-12
Attending: INTERNAL MEDICINE
Payer: MEDICARE

## 2019-12-12 DIAGNOSIS — E78.5 HYPERLIPIDEMIA LDL GOAL <100: ICD-10-CM

## 2019-12-12 DIAGNOSIS — J98.4 CAVITARY LUNG DISEASE: ICD-10-CM

## 2019-12-12 LAB
AORTIC ROOT ANNULUS: 3.1 CM
AORTIC VALVE CUSP SEPERATION: 2.14 CM
ASCENDING AORTA: 2.72 CM
AV INDEX (PROSTH): 0.77
AV MEAN GRADIENT: 3 MMHG
AV PEAK GRADIENT: 5 MMHG
AV VALVE AREA: 2.87 CM2
AV VELOCITY RATIO: 0.77
CV ECHO LV RWT: 0.64 CM
DOP CALC AO PEAK VEL: 1.17 M/S
DOP CALC AO VTI: 30.84 CM
DOP CALC LVOT AREA: 3.7 CM2
DOP CALC LVOT DIAMETER: 2.18 CM
DOP CALC LVOT PEAK VEL: 0.9 M/S
DOP CALC LVOT STROKE VOLUME: 88.49 CM3
DOP CALCLVOT PEAK VEL VTI: 23.72 CM
E WAVE DECELERATION TIME: 197.38 MSEC
E/A RATIO: 1.09
E/E' RATIO: 8.8 M/S
ECHO LV POSTERIOR WALL: 1.24 CM (ref 0.6–1.1)
FRACTIONAL SHORTENING: 38 % (ref 28–44)
INTERVENTRICULAR SEPTUM: 1.24 CM (ref 0.6–1.1)
IVRT: 0.1 MSEC
LA MAJOR: 4.84 CM
LA MINOR: 4.72 CM
LA WIDTH: 3.58 CM
LEFT ATRIUM SIZE: 3.13 CM
LEFT ATRIUM VOLUME: 45.52 CM3
LEFT INTERNAL DIMENSION IN SYSTOLE: 2.41 CM (ref 2.1–4)
LEFT VENTRICLE DIASTOLIC VOLUME: 66.05 ML
LEFT VENTRICLE SYSTOLIC VOLUME: 20.38 ML
LEFT VENTRICULAR INTERNAL DIMENSION IN DIASTOLE: 3.9 CM (ref 3.5–6)
LEFT VENTRICULAR MASS: 167.31 G
LV LATERAL E/E' RATIO: 7.33 M/S
LV SEPTAL E/E' RATIO: 11 M/S
MV PEAK A VEL: 0.81 M/S
MV PEAK E VEL: 0.88 M/S
PISA TR MAX VEL: 2.59 M/S
PULM VEIN S/D RATIO: 1.24
PV PEAK D VEL: 0.42 M/S
PV PEAK S VEL: 0.52 M/S
PV PEAK VELOCITY: 1.17 CM/S
RA MAJOR: 5.19 CM
RA PRESSURE: 3 MMHG
RA WIDTH: 3.41 CM
RIGHT VENTRICULAR END-DIASTOLIC DIMENSION: 3.84 CM
RV TISSUE DOPPLER FREE WALL SYSTOLIC VELOCITY 1 (APICAL 4 CHAMBER VIEW): 14.51 CM/S
SINUS: 2.95 CM
STJ: 2.71 CM
TDI LATERAL: 0.12 M/S
TDI SEPTAL: 0.08 M/S
TDI: 0.1 M/S
TR MAX PG: 27 MMHG
TRICUSPID ANNULAR PLANE SYSTOLIC EXCURSION: 1.81 CM
TV REST PULMONARY ARTERY PRESSURE: 30 MMHG

## 2019-12-12 PROCEDURE — 93306 TTE W/DOPPLER COMPLETE: CPT | Mod: 26,,, | Performed by: INTERNAL MEDICINE

## 2019-12-12 PROCEDURE — 93306 ECHO (CUPID ONLY): ICD-10-PCS | Mod: 26,,, | Performed by: INTERNAL MEDICINE

## 2019-12-12 PROCEDURE — 93306 TTE W/DOPPLER COMPLETE: CPT

## 2020-01-02 ENCOUNTER — OFFICE VISIT (OUTPATIENT)
Dept: CARDIOLOGY | Facility: CLINIC | Age: 80
End: 2020-01-02
Payer: MEDICARE

## 2020-01-02 VITALS
DIASTOLIC BLOOD PRESSURE: 71 MMHG | SYSTOLIC BLOOD PRESSURE: 126 MMHG | BODY MASS INDEX: 25.76 KG/M2 | RESPIRATION RATE: 16 BRPM | HEART RATE: 59 BPM | WEIGHT: 164.44 LBS | OXYGEN SATURATION: 98 %

## 2020-01-02 DIAGNOSIS — J43.2 CENTRILOBULAR EMPHYSEMA: Primary | ICD-10-CM

## 2020-01-02 DIAGNOSIS — E78.5 HYPERLIPIDEMIA LDL GOAL <100: ICD-10-CM

## 2020-01-02 DIAGNOSIS — E11.40 CONTROLLED TYPE 2 DIABETES WITH NEUROPATHY: ICD-10-CM

## 2020-01-02 PROCEDURE — 99999 PR PBB SHADOW E&M-EST. PATIENT-LVL III: CPT | Mod: PBBFAC,,, | Performed by: INTERNAL MEDICINE

## 2020-01-02 PROCEDURE — 1126F AMNT PAIN NOTED NONE PRSNT: CPT | Mod: S$GLB,,, | Performed by: INTERNAL MEDICINE

## 2020-01-02 PROCEDURE — 99499 RISK ADDL DX/OHS AUDIT: ICD-10-PCS | Mod: S$GLB,,, | Performed by: INTERNAL MEDICINE

## 2020-01-02 PROCEDURE — 1101F PR PT FALLS ASSESS DOC 0-1 FALLS W/OUT INJ PAST YR: ICD-10-PCS | Mod: CPTII,S$GLB,, | Performed by: INTERNAL MEDICINE

## 2020-01-02 PROCEDURE — 99213 OFFICE O/P EST LOW 20 MIN: CPT | Mod: S$GLB,,, | Performed by: INTERNAL MEDICINE

## 2020-01-02 PROCEDURE — 1126F PR PAIN SEVERITY QUANTIFIED, NO PAIN PRESENT: ICD-10-PCS | Mod: S$GLB,,, | Performed by: INTERNAL MEDICINE

## 2020-01-02 PROCEDURE — 1159F PR MEDICATION LIST DOCUMENTED IN MEDICAL RECORD: ICD-10-PCS | Mod: S$GLB,,, | Performed by: INTERNAL MEDICINE

## 2020-01-02 PROCEDURE — 3078F PR MOST RECENT DIASTOLIC BLOOD PRESSURE < 80 MM HG: ICD-10-PCS | Mod: CPTII,S$GLB,, | Performed by: INTERNAL MEDICINE

## 2020-01-02 PROCEDURE — 99999 PR PBB SHADOW E&M-EST. PATIENT-LVL III: ICD-10-PCS | Mod: PBBFAC,,, | Performed by: INTERNAL MEDICINE

## 2020-01-02 PROCEDURE — 3078F DIAST BP <80 MM HG: CPT | Mod: CPTII,S$GLB,, | Performed by: INTERNAL MEDICINE

## 2020-01-02 PROCEDURE — 1101F PT FALLS ASSESS-DOCD LE1/YR: CPT | Mod: CPTII,S$GLB,, | Performed by: INTERNAL MEDICINE

## 2020-01-02 PROCEDURE — 99213 PR OFFICE/OUTPT VISIT, EST, LEVL III, 20-29 MIN: ICD-10-PCS | Mod: S$GLB,,, | Performed by: INTERNAL MEDICINE

## 2020-01-02 PROCEDURE — 3074F SYST BP LT 130 MM HG: CPT | Mod: CPTII,S$GLB,, | Performed by: INTERNAL MEDICINE

## 2020-01-02 PROCEDURE — 99499 UNLISTED E&M SERVICE: CPT | Mod: S$GLB,,, | Performed by: INTERNAL MEDICINE

## 2020-01-02 PROCEDURE — 3074F PR MOST RECENT SYSTOLIC BLOOD PRESSURE < 130 MM HG: ICD-10-PCS | Mod: CPTII,S$GLB,, | Performed by: INTERNAL MEDICINE

## 2020-01-02 PROCEDURE — 1159F MED LIST DOCD IN RCRD: CPT | Mod: S$GLB,,, | Performed by: INTERNAL MEDICINE

## 2020-01-20 ENCOUNTER — HOSPITAL ENCOUNTER (OUTPATIENT)
Dept: RADIOLOGY | Facility: HOSPITAL | Age: 80
Discharge: HOME | End: 2020-01-20
Attending: INTERNAL MEDICINE
Payer: MEDICARE

## 2020-01-20 DIAGNOSIS — J98.4 CAVITARY LUNG DISEASE: ICD-10-CM

## 2020-01-20 PROCEDURE — 71250 CT CHEST WITHOUT CONTRAST: ICD-10-PCS | Mod: 26,,, | Performed by: RADIOLOGY

## 2020-01-20 PROCEDURE — 71250 CT THORAX DX C-: CPT | Mod: TC

## 2020-01-20 PROCEDURE — 71250 CT THORAX DX C-: CPT | Mod: 26,,, | Performed by: RADIOLOGY

## 2020-01-27 ENCOUNTER — TELEPHONE (OUTPATIENT)
Dept: SLEEP MEDICINE | Facility: HOSPITAL | Age: 80
End: 2020-01-27

## 2020-01-27 NOTE — TELEPHONE ENCOUNTER
----- Message from Tracey Olson sent at 1/27/2020  9:04 AM CST -----  Contact: RACIEL MALCOLM [[id number]]  Type: Patient Call Back    Who called:RACIEL MALCOLM [[id number]]    What is the request in detail: Patient is requesting a call back. He states that he received a text regarding a sooner appointment. He would like to get the details.   Please contact to further advise.    Can the clinic reply by [facility]? No    Best call back number: [phone]    Additional Information: N/A

## 2020-02-14 ENCOUNTER — OFFICE VISIT (OUTPATIENT)
Dept: PULMONOLOGY | Facility: CLINIC | Age: 80
End: 2020-02-14
Payer: MEDICARE

## 2020-02-14 VITALS
SYSTOLIC BLOOD PRESSURE: 117 MMHG | BODY MASS INDEX: 26.33 KG/M2 | DIASTOLIC BLOOD PRESSURE: 73 MMHG | HEART RATE: 55 BPM | HEIGHT: 67 IN | WEIGHT: 167.75 LBS | OXYGEN SATURATION: 97 %

## 2020-02-14 DIAGNOSIS — R91.1 LUNG NODULE: ICD-10-CM

## 2020-02-14 DIAGNOSIS — J98.4 CAVITARY LUNG DISEASE: ICD-10-CM

## 2020-02-14 DIAGNOSIS — J43.2 CENTRILOBULAR EMPHYSEMA: ICD-10-CM

## 2020-02-14 PROCEDURE — 99999 PR PBB SHADOW E&M-EST. PATIENT-LVL III: ICD-10-PCS | Mod: PBBFAC,,, | Performed by: INTERNAL MEDICINE

## 2020-02-14 PROCEDURE — 1101F PT FALLS ASSESS-DOCD LE1/YR: CPT | Mod: CPTII,S$GLB,, | Performed by: INTERNAL MEDICINE

## 2020-02-14 PROCEDURE — 1101F PR PT FALLS ASSESS DOC 0-1 FALLS W/OUT INJ PAST YR: ICD-10-PCS | Mod: CPTII,S$GLB,, | Performed by: INTERNAL MEDICINE

## 2020-02-14 PROCEDURE — 1159F PR MEDICATION LIST DOCUMENTED IN MEDICAL RECORD: ICD-10-PCS | Mod: S$GLB,,, | Performed by: INTERNAL MEDICINE

## 2020-02-14 PROCEDURE — 3078F PR MOST RECENT DIASTOLIC BLOOD PRESSURE < 80 MM HG: ICD-10-PCS | Mod: CPTII,S$GLB,, | Performed by: INTERNAL MEDICINE

## 2020-02-14 PROCEDURE — 99214 OFFICE O/P EST MOD 30 MIN: CPT | Mod: S$GLB,,, | Performed by: INTERNAL MEDICINE

## 2020-02-14 PROCEDURE — 1126F AMNT PAIN NOTED NONE PRSNT: CPT | Mod: S$GLB,,, | Performed by: INTERNAL MEDICINE

## 2020-02-14 PROCEDURE — 99214 PR OFFICE/OUTPT VISIT, EST, LEVL IV, 30-39 MIN: ICD-10-PCS | Mod: S$GLB,,, | Performed by: INTERNAL MEDICINE

## 2020-02-14 PROCEDURE — 3078F DIAST BP <80 MM HG: CPT | Mod: CPTII,S$GLB,, | Performed by: INTERNAL MEDICINE

## 2020-02-14 PROCEDURE — 3074F SYST BP LT 130 MM HG: CPT | Mod: CPTII,S$GLB,, | Performed by: INTERNAL MEDICINE

## 2020-02-14 PROCEDURE — 1159F MED LIST DOCD IN RCRD: CPT | Mod: S$GLB,,, | Performed by: INTERNAL MEDICINE

## 2020-02-14 PROCEDURE — 99999 PR PBB SHADOW E&M-EST. PATIENT-LVL III: CPT | Mod: PBBFAC,,, | Performed by: INTERNAL MEDICINE

## 2020-02-14 PROCEDURE — 1126F PR PAIN SEVERITY QUANTIFIED, NO PAIN PRESENT: ICD-10-PCS | Mod: S$GLB,,, | Performed by: INTERNAL MEDICINE

## 2020-02-14 PROCEDURE — 3074F PR MOST RECENT SYSTOLIC BLOOD PRESSURE < 130 MM HG: ICD-10-PCS | Mod: CPTII,S$GLB,, | Performed by: INTERNAL MEDICINE

## 2020-02-14 NOTE — PROGRESS NOTES
Diego Gunter  was seen as a follow up.    CHIEF COMPLAINT:  Results      HISTORY OF PRESENT ILLNESS: Deigo Gunter is a 79 y.o. male  has a past medical history of Dysphagia, colonic polyps, Hyperlipidemia LDL goal <100, Overweight(278.02), Prostate cancer (september 2011), and Type II or unspecified type diabetes mellitus without mention of complication, not stated as uncontrolled.  Our first encounter was 10/2/18.  Patient was diagnosed with prostate in 2011 s/p prostatectomy.  Patient with remote smoking history and quit in 1962.  Patient worked in [employer] as a  over 35 years.  Some sandblasting.      Patient with persistent productive cough x 1 year.  +dark brown phlegm.  No hemoptysis.  Patient underwent CT of chest by pcp which revealed rul cavitary lesion.  quantiferon and HIV were negative.   +weight loss 30 lbs since 2016.      S/p fob 10/15/18 with bal.  After fob, patient developed fever,sob and confusion.  Patient was admitted with doxy and rocephin.  Patient was discharge with augmentin.   Fob was negative for malignancy.  Bal with hillary.  Patient was seen by Dr. Clark.  No treatment.  Decision is to follow with serial ct imaging.      Patient routinely exercise with treadmill, ellipitical 4 times per week for 1 hour session without issue.  Patient with blood tinged phlegm x 1 month.  Stopped for past week.  No fever/chill.  No chest pain.  Stable weight.  Normal appetite.      PAST MEDICAL HISTORY:    Active Ambulatory Problems     Diagnosis Date Noted    History of prostate cancer 09/01/2011    Hx of colonic polyps     Controlled type 2 diabetes with neuropathy     Allergic rhinitis 07/07/2014    Anxiety 07/14/2014    Hyperlipidemia LDL goal <100     Dysphagia, oropharyngeal 06/17/2016    Atherosclerosis of aorta 09/27/2018    Centrilobular emphysema 09/27/2018    Cavitary lung disease 10/16/2018    HILLARY (mycobacterium avium-intracellulare) 10/26/2018     Resolved Ambulatory  Problems     Diagnosis Date Noted    Overweight (BMI 25.0-29.9)     Occipital neuralgia 2014    Headache(784.0) 2014    Dog scratch 2015    Cellulitis of arm, right 2015    Cavitary lesion of lung 10/15/2018    Pneumonia of both lungs due to infectious organism 10/15/2018    Hemoptysis 10/26/2018     Past Medical History:   Diagnosis Date    Dysphagia     Overweight(278.02)     Prostate cancer 2011    Type II or unspecified type diabetes mellitus without mention of complication, not stated as uncontrolled                 PAST SURGICAL HISTORY:    Past Surgical History:   Procedure Laterality Date    BRONCHOSCOPY N/A 10/15/2018    Procedure: BRONCHOSCOPY;  Surgeon: Pratibha Diagnostic Provider;  Location: [facility] OR [address] [place] [address];  Service: Anesthesiology;  Laterality: N/A;    CATARACT EXTRACTION, BILATERAL      PROSTATECTOMY           FAMILY HISTORY:                Family History   Problem Relation Age of Onset    Colon cancer Mother     Diabetes Mother     Heart disease Father     Mental illness Sister     Diabetes Sister     Other Brother         polio as a child    Diabetes Unknown     Diabetes Brother     Myasthenia gravis Brother     Parkinsonism Brother     Diabetes Brother     Dementia Brother     Lung cancer Brother         smoker    Diabetes Maternal Aunt        SOCIAL HISTORY:          Tobacco:   Social History     Tobacco Use   Smoking Status Former Smoker    Packs/day: 0.25    Years: 5.00    Pack years: 1.25    Last attempt to quit: 10/2/1962    Years since quittin.4   Smokeless Tobacco Never Used   Tobacco Comment    quit age 23     alcohol use:    Social History     Substance and Sexual Activity   Alcohol Use Yes    Comment: beer occasionally               Occupation:  Former shipyard worker    ALLERGIES:  Review of patient's allergies indicates:  No Known Allergies    CURRENT MEDICATIONS:    Current Outpatient Medications   Medication Sig  "Dispense Refill    ascorbic acid (VITAMIN C) 1000 MG tablet Take 1,000 mg by mouth once daily.        b complex vitamins capsule Take 1 capsule by mouth once daily.      FLUAD 9408-1607, 65 YR UP,,PF, 45 mcg (15 mcg x 3)/0.5 mL Syrg       omega 3-dha-epa-fish oil 1,000 (120-180) mg Cap Take 1 capsule by mouth once daily.        omeprazole 20 mg TbEC 1/2 tablet daily in am as needed. 45 each 0    pravastatin (PRAVACHOL) 10 MG tablet TAKE 1 TABLET BY MOUTH ONCE DAILY 90 tablet 1     No current facility-administered medications for this visit.                   REVIEW OF SYSTEMS:     Pulmonary related symptoms as per HPI.  Gen:  no weight loss, no fever, no night sweat  HEENT:  no visual changes, no sore throat, no hearing loss  CV:  No chest pain, no orthopnea, no PND  GI:  no melena, no hematochezia, no diarhea, no constipation.  H/o dysphagia  :  no dysuria, no hematuria, no hesistancy, no dribbling  Neuro:  no syncope, no vertigo, no tinitus  Psych:  No homocide or suicide ideation; no depression.  Endocrine:  No heat or cold intolerance.  Sleep:  No snoring; no witnessed apnea.  Otherwise, a balance of systems reviewed is negative.          PHYSICAL EXAM:  Vitals:    02/14/20 1319   BP: 117/73   Pulse: (!) 55   SpO2: 97%   Weight: 76.1 kg (167 lb 12.3 oz)   Height: 5' 7" (1.702 m)   PainSc: 0-No pain     Body mass index is 26.28 kg/m².     GENERAL:  well develop; no apparent distress  HEENT:  no nasal congestion; no discharge noted; class 3 modified mallampatti.   NECK:  supple; no palpable masses.  CARDIO: regular rate and rhythm  PULM:  clear to auscultation bilaterally; no intercostals retractions; no accessory muscle usage   ABDOMEN:  soft nontender/nondistended.  +bowel sound  EXTREMITIES no cce  NEURO:  CN II-XII intact.  5/5 motor in all extremities.  sensation grossly intact   to light touch.  PSYCH:  normal affect.  Alert and oriented x 4    LABS  Pulmonary Functions Testing Results: 10/8/18 " unable to produce acceptable effort.    ABG none  CXR:  8/5/15 no effusion or consoliation  CT CHEST:  9/21/18 bilateral emphysematous changes.  RUL cavitary lesion (new when compared to 2016 ct of neck)  cta 10/15/18 persistent rul cavitary lesion  7/15/19 no changes in rul cavitation.      ASSESSMENT  Problem List Items Addressed This Visit     Cavitary lung disease    Overview     rul cavitary lesion s/p fob.  Cytology from fob is negative for malignancy.  Sputum 10/3/18 with brie.           Current Assessment & Plan     Will repeat ct 1 year.           Relevant Orders    CT Chest Without Contrast    Centrilobular emphysema    Overview     - noted on CT of chest 9/2018         Current Assessment & Plan     Unable to perform pft.  Clinically asymptomatic.             Other Visit Diagnoses     Lung nodule        Relevant Orders    CT Chest Without Contrast         Patient will Follow up in about 1 year (around 2/14/2021). with md/np.

## 2020-02-24 ENCOUNTER — TELEPHONE (OUTPATIENT)
Dept: FAMILY MEDICINE | Facility: CLINIC | Age: 80
End: 2020-02-24

## 2020-02-24 DIAGNOSIS — E11.40 CONTROLLED TYPE 2 DIABETES WITH NEUROPATHY: ICD-10-CM

## 2020-02-24 DIAGNOSIS — E78.5 HYPERLIPIDEMIA LDL GOAL <100: Primary | ICD-10-CM

## 2020-02-24 NOTE — TELEPHONE ENCOUNTER
Left message for patient to call office, there doctor ordered fasting labs to be done prior to office visit.

## 2020-02-24 NOTE — TELEPHONE ENCOUNTER
Pt. Requesting labs prior to 3/6/2020 office visit. Labs were pulled up what would you like for pt. To have done, I will call him to schedule.

## 2020-02-24 NOTE — TELEPHONE ENCOUNTER
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 Please contact to further discuss and advise.        Can the clinic reply by [facility]: N       What Number to Call Back if not in [facility]: [phone]

## 2020-02-26 ENCOUNTER — LAB VISIT (OUTPATIENT)
Dept: LAB | Facility: HOSPITAL | Age: 80
End: 2020-02-26
Attending: INTERNAL MEDICINE
Payer: MEDICARE

## 2020-02-26 DIAGNOSIS — E78.5 HYPERLIPIDEMIA LDL GOAL <100: ICD-10-CM

## 2020-02-26 DIAGNOSIS — E11.40 CONTROLLED TYPE 2 DIABETES WITH NEUROPATHY: ICD-10-CM

## 2020-02-26 LAB
CHOLEST SERPL-MCNC: 144 MG/DL (ref 120–199)
CHOLEST/HDLC SERPL: 3.7 {RATIO} (ref 2–5)
ESTIMATED AVG GLUCOSE: 126 MG/DL (ref 68–131)
HBA1C MFR BLD HPLC: 6 % (ref 4–5.6)
HDLC SERPL-MCNC: 39 MG/DL (ref 40–75)
HDLC SERPL: 27.1 % (ref 20–50)
LDLC SERPL CALC-MCNC: 87.4 MG/DL (ref 63–159)
NONHDLC SERPL-MCNC: 105 MG/DL
TRIGL SERPL-MCNC: 88 MG/DL (ref 30–150)

## 2020-02-26 PROCEDURE — 80061 LIPID PANEL: CPT

## 2020-02-26 PROCEDURE — 83036 HEMOGLOBIN GLYCOSYLATED A1C: CPT

## 2020-02-26 PROCEDURE — 36415 COLL VENOUS BLD VENIPUNCTURE: CPT | Mod: PO

## 2020-03-06 ENCOUNTER — OFFICE VISIT (OUTPATIENT)
Dept: FAMILY MEDICINE | Facility: CLINIC | Age: 80
End: 2020-03-06
Payer: MEDICARE

## 2020-03-06 VITALS
BODY MASS INDEX: 25.83 KG/M2 | TEMPERATURE: 98 F | HEIGHT: 67 IN | OXYGEN SATURATION: 98 % | HEART RATE: 54 BPM | DIASTOLIC BLOOD PRESSURE: 62 MMHG | SYSTOLIC BLOOD PRESSURE: 92 MMHG | WEIGHT: 164.56 LBS

## 2020-03-06 DIAGNOSIS — E11.40 CONTROLLED TYPE 2 DIABETES WITH NEUROPATHY: ICD-10-CM

## 2020-03-06 DIAGNOSIS — I70.0 ATHEROSCLEROSIS OF AORTA: ICD-10-CM

## 2020-03-06 DIAGNOSIS — A31.0 MAI (MYCOBACTERIUM AVIUM-INTRACELLULARE): ICD-10-CM

## 2020-03-06 DIAGNOSIS — J43.2 CENTRILOBULAR EMPHYSEMA: ICD-10-CM

## 2020-03-06 DIAGNOSIS — Z00.00 ROUTINE MEDICAL EXAM: Primary | ICD-10-CM

## 2020-03-06 DIAGNOSIS — J98.4 CAVITARY LUNG DISEASE: ICD-10-CM

## 2020-03-06 DIAGNOSIS — E78.5 HYPERLIPIDEMIA LDL GOAL <100: ICD-10-CM

## 2020-03-06 PROCEDURE — 99999 PR PBB SHADOW E&M-EST. PATIENT-LVL III: CPT | Mod: PBBFAC,,, | Performed by: INTERNAL MEDICINE

## 2020-03-06 PROCEDURE — 99499 RISK ADDL DX/OHS AUDIT: ICD-10-PCS | Mod: S$GLB,,, | Performed by: INTERNAL MEDICINE

## 2020-03-06 PROCEDURE — 99214 OFFICE O/P EST MOD 30 MIN: CPT | Mod: S$GLB,,, | Performed by: INTERNAL MEDICINE

## 2020-03-06 PROCEDURE — 3078F DIAST BP <80 MM HG: CPT | Mod: CPTII,S$GLB,, | Performed by: INTERNAL MEDICINE

## 2020-03-06 PROCEDURE — 3074F SYST BP LT 130 MM HG: CPT | Mod: CPTII,S$GLB,, | Performed by: INTERNAL MEDICINE

## 2020-03-06 PROCEDURE — 99214 PR OFFICE/OUTPT VISIT, EST, LEVL IV, 30-39 MIN: ICD-10-PCS | Mod: S$GLB,,, | Performed by: INTERNAL MEDICINE

## 2020-03-06 PROCEDURE — 99999 PR PBB SHADOW E&M-EST. PATIENT-LVL III: ICD-10-PCS | Mod: PBBFAC,,, | Performed by: INTERNAL MEDICINE

## 2020-03-06 PROCEDURE — 3078F PR MOST RECENT DIASTOLIC BLOOD PRESSURE < 80 MM HG: ICD-10-PCS | Mod: CPTII,S$GLB,, | Performed by: INTERNAL MEDICINE

## 2020-03-06 PROCEDURE — 3074F PR MOST RECENT SYSTOLIC BLOOD PRESSURE < 130 MM HG: ICD-10-PCS | Mod: CPTII,S$GLB,, | Performed by: INTERNAL MEDICINE

## 2020-03-06 PROCEDURE — 99499 UNLISTED E&M SERVICE: CPT | Mod: S$GLB,,, | Performed by: INTERNAL MEDICINE

## 2020-03-06 NOTE — PROGRESS NOTES
HISTORY OF PRESENT ILLNESS:  Diego Gunter is a 79 y.o. male who presents to the clinic today for a routine medical physical exam. His last physical exam was approximately 1 years(s) ago.      PAST MEDICAL HISTORY:  Past Medical History:   Diagnosis Date    Dysphagia     Hx of colonic polyps     followed by GI. Dr. Pham    Hyperlipidemia LDL goal <100     Overweight(278.02)     Prostate cancer 2011    followed by urology, Dr. Rogers    Type II or unspecified type diabetes mellitus without mention of complication, not stated as uncontrolled     diet controlled       PAST SURGICAL HISTORY:  Past Surgical History:   Procedure Laterality Date    BRONCHOSCOPY N/A 10/15/2018    Procedure: BRONCHOSCOPY;  [facility];  Location: [facility] OR [address] [place] [address];  Service: Anesthesiology;  Laterality: N/A;    CATARACT EXTRACTION, BILATERAL      PROSTATECTOMY         SOCIAL HISTORY:  Social History     Socioeconomic History    Marital status:      Spouse name: Not on file    Number of children: 2    Years of education: Not on file    Highest education level: Not on file   Occupational History    Occupation: tool    Social Needs    Financial resource strain: Not on file    Food insecurity:     Worry: Not on file     Inability: Not on file    Transportation needs:     Medical: Not on file     Non-medical: Not on file   Tobacco Use    Smoking status: Former Smoker     Packs/day: 0.25     Years: 5.00     Pack years: 1.25     Last attempt to quit: 10/2/1962     Years since quittin.4    Smokeless tobacco: Never Used    Tobacco comment: quit age 23   Substance and Sexual Activity    Alcohol use: Yes     Comment: beer occasionally    Drug use: No    Sexual activity: Yes     Partners: Female   Lifestyle    Physical activity:     Days per week: Not on file     Minutes per session: Not on file    Stress: Not at all   Relationships    Social connections:      Talks on phone: Not on file     Gets together: Not on file     Attends Cheondoism service: Not on file     Active member of club or organization: Not on file     Attends meetings of clubs or organizations: Not on file     Relationship status: Not on file   Other Topics Concern    Not on file   Social History Narrative    Not on file       FAMILY HISTORY:  Family History   Problem Relation Age of Onset    Colon cancer Mother     Diabetes Mother     Heart disease Father     Mental illness Sister     Diabetes Sister     Other Brother         polio as a child    Diabetes Unknown     Diabetes Brother     Myasthenia gravis Brother     Parkinsonism Brother     Diabetes Brother     Dementia Brother     Lung cancer Brother         smoker    Diabetes Maternal Aunt        ALLERGIES AND MEDICATIONS: updated and reviewed.  Review of patient's allergies indicates:  No Known Allergies  Medication List with Changes/Refills   Current Medications    ASCORBIC ACID (VITAMIN C) 1000 MG TABLET    Take 1,000 mg by mouth once daily.      B COMPLEX VITAMINS CAPSULE    Take 1 capsule by mouth once daily.    FLUAD 9094-2645, 65 YR UP,,PF, 45 MCG (15 MCG X 3)/0.5 ML SYRG        OMEGA 3-DHA-EPA-FISH OIL 1,000 (120-180) MG CAP    Take 1 capsule by mouth once daily.      OMEPRAZOLE 20 MG TBEC    1/2 tablet daily in am as needed.    PRAVASTATIN (PRAVACHOL) 10 MG TABLET    TAKE 1 TABLET BY MOUTH ONCE DAILY         CARE TEAM:  Patient Care Team:  Portia Ferreira MD as PCP - General (Internal Medicine)  Dustin Mccarthy III, MD as Consulting Physician (Orthopedic Surgery)  Pierre Rogers MD as Consulting Physician (Urology)  Burak Gurrola MD as Consulting Physician (Pulmonary Disease)  Theo Alvares MD as Consulting Physician (Infectious Diseases)  Suzan Glass LPN as Licensed Practical Nurse  Gilberto Silva OD as Consulting Physician (Optometry)           SCREENING HISTORY:  Health Maintenance       Date Due  "Completion Date    Hemoglobin A1c 08/26/2020 2/26/2020    Urine Microalbumin 08/29/2020 8/29/2019    Lipid Panel 02/26/2021 2/26/2020    TETANUS VACCINE 03/08/2025 3/8/2015            REVIEW OF SYSTEMS:   The patient reports: good dietary habits.  The patient reports : that they exercise regularly.  Review of Systems   Constitutional: Negative for chills, fatigue, fever and unexpected weight change.   HENT: Negative for congestion, ear pain, sore throat and voice change.    Eyes: Negative for photophobia, pain, discharge and visual disturbance.   Respiratory: Positive for cough (- chronic). Negative for shortness of breath and wheezing.    Cardiovascular: Negative for chest pain, palpitations and leg swelling.   Gastrointestinal: Negative for abdominal pain, blood in stool, constipation, diarrhea, nausea and vomiting.   Genitourinary: Negative for dysuria and frequency.   Musculoskeletal: Negative for gait problem, joint swelling and neck stiffness.   Skin: Negative for color change and rash.   Neurological: Negative for seizures, weakness and headaches.   Hematological: Negative for adenopathy. Does not bruise/bleed easily.   Psychiatric/Behavioral: Negative for behavioral problems and dysphoric mood. The patient is not nervous/anxious.      ROS : patient denies: difficulty initiating urination          PHYSICAL EXAM:  Vitals:    03/06/20 0848   BP: 92/62   Pulse: (!) 54   Temp: 97.5 °F (36.4 °C)     Weight: 74.6 kg (164 lb 9.2 oz)   Height: 5' 7" (170.2 cm)   Body mass index is 25.78 kg/m².    General appearance - alert, well appearing, and in no distress, normal appearing weight  Psychiatric - alert, oriented to person, place, and time, normal mood, behavior, speech, dress, motor activity, and thought processes  Eyes - pupils equal and reactive, extraocular eye movements intact, sclera anicteric  Ears - right TM/ear canal obscured by cerumen, left TM/ear canal obscured by cerumen  Mouth - mucous membranes " moist, pharynx normal without lesions  Neck - supple, no significant adenopathy, carotids upstroke normal bilaterally, no bruits, thyroid exam: thyroid is normal in size without nodules or tenderness  Lymphatics - no palpable cervical lymphadenopathy  Chest - clear to auscultation, no wheezes, rales or rhonchi, symmetric air entry  Heart - normal rate and regular rhythm, no gallops noted  Abdomen - soft, nontender, nondistended, no masses or organomegaly   Male - not examined  Back exam - full range of motion, no tenderness, palpable spasm or pain on motion, in depth exam deferred  Neurological - alert, normal speech, no focal findings or movement disorder noted, cranial nerves II through XII intact  Musculoskeletal - no joint tenderness, deformity or swelling, no muscular tenderness noted  Extremities - peripheral pulses normal, no pedal edema, no clubbing or cyanosis  Skin - normal coloration and turgor, no rashes, no suspicious skin lesions noted      Labs:  Lab Results   Component Value Date    HGBA1C 6.0 (H) 02/26/2020    HGBA1C 5.6 08/29/2019    HGBA1C 5.6 02/14/2019      Lab Results   Component Value Date    CHOL 144 02/26/2020    CHOL 133 02/14/2019    CHOL 142 01/23/2018     Lab Results   Component Value Date    LDLCALC 87.4 02/26/2020    LDLCALC 74.6 02/14/2019    LDLCALC 80.4 01/23/2018           ASSESSMENT AND PLAN:  1. Routine medical exam  Counseled on age appropriate medical preventative services including age appropriate cancer screenings, age appropriate eye and dental exams, over all nutritional health, need for a consistent exercise regimen, and an over all push towards maintaining a vigorous and active lifestyle.  Counseled on age appropriate vaccines and discussed upcoming health care needs based on age/gender. Discussed good sleep hygiene and stress management.    2. Controlled type 2 diabetes with neuropathy  Diabetes is under good control control at this time for age and comorbid  conditions. We discussed diabetic diet and regular exercise. We discussed home blood sugar monitoring, if appropriate - the patient does not need to test daily but can test only as needed. Continue current medication regimen.  Diabetic complications addressed: Neuropathy pain controlled.  Patient was counseled on the need for yearly eye exam to screen for/monitor diabetic retinopathy and yearly diabetic foot exam.    3. Hyperlipidemia LDL goal <100  We discussed low fat diet and regular exercise.The current medical regimen is effective;  continue present plan and medications.     4. Centrilobular emphysema/5. Cavitary lung disease/6. HILLARY (mycobacterium avium-intracellulare)  The current medical regimen is effective;  continue present plan and medications.   Followed by: Pulmonology.     7. Atherosclerosis of aorta  Patient with Atherosclerosis of the Aorta.  Stable/asymptomatic. Currently stable on lipid lowering medication and b/p monitoring.           Follow up in about 6 months (around 9/6/2020), or if symptoms worsen or fail to improve, for follow up chronic medical conditions.. or sooner as needed.

## 2020-03-16 ENCOUNTER — TELEPHONE (OUTPATIENT)
Dept: NEUROLOGY | Facility: CLINIC | Age: 80
End: 2020-03-16

## 2020-03-16 NOTE — TELEPHONE ENCOUNTER
Does not have to come in she will have a virtual visit with him.          ----- Message from Tano Doyle sent at 3/16/2020  9:18 AM CDT -----  Contact: RACIEL MALCOLM [[id number]]   Name of Who is Calling:     What is the request in detail:  Patient request call back in reference to questions/concerns about appointment Please contact to further discuss and advise      Can the clinic reply by [facility]: no     What Number to [facility] Back [facility]:  [phone]

## 2020-03-19 ENCOUNTER — TELEPHONE (OUTPATIENT)
Dept: NEUROLOGY | Facility: CLINIC | Age: 80
End: 2020-03-19

## 2020-03-19 NOTE — TELEPHONE ENCOUNTER
Spoke to patient and wife over the phone. Patient's memory has remained stable and he is able to perform all ADLs on his own. He is compliant with aspirin and statin for stroke prevention. Carotid US results were discussed with him. Him and his wife were advised to notify me for worsening symptoms. They were made aware that I will be moving out of state and I will help them transition care to a new neurologist in June 2020.    Zahraa Elizabeth DO

## 2020-04-02 ENCOUNTER — TELEPHONE (OUTPATIENT)
Dept: NEUROLOGY | Facility: CLINIC | Age: 80
End: 2020-04-02

## 2020-04-03 ENCOUNTER — PATIENT MESSAGE (OUTPATIENT)
Dept: NEUROLOGY | Facility: CLINIC | Age: 80
End: 2020-04-03

## 2020-04-03 ENCOUNTER — TELEPHONE (OUTPATIENT)
Dept: NEUROLOGY | Facility: CLINIC | Age: 80
End: 2020-04-03

## 2020-04-08 ENCOUNTER — OFFICE VISIT (OUTPATIENT)
Dept: NEUROLOGY | Facility: CLINIC | Age: 80
End: 2020-04-08
Payer: MEDICARE

## 2020-04-08 DIAGNOSIS — F41.9 ANXIETY: ICD-10-CM

## 2020-04-08 DIAGNOSIS — G31.84 MILD COGNITIVE IMPAIRMENT: Primary | ICD-10-CM

## 2020-04-08 PROCEDURE — 96121 PR NEUROBEHAVIORAL STAT EXAM, EA ADDTL HR: ICD-10-PCS | Mod: 95,,, | Performed by: PSYCHIATRY & NEUROLOGY

## 2020-04-08 PROCEDURE — 96116 PR NEUROBEHAVIORAL STATUS EXAM BY PSYCH/PHYS: ICD-10-PCS | Mod: 95,,, | Performed by: PSYCHIATRY & NEUROLOGY

## 2020-04-08 PROCEDURE — 96121 NUBHVL XM PHY/QHP EA ADDL HR: CPT | Mod: 95,,, | Performed by: PSYCHIATRY & NEUROLOGY

## 2020-04-08 PROCEDURE — 96116 NUBHVL XM PHYS/QHP 1ST HR: CPT | Mod: 95,,, | Performed by: PSYCHIATRY & NEUROLOGY

## 2020-04-08 PROCEDURE — 99499 NO LOS: ICD-10-PCS | Mod: 95,,, | Performed by: PSYCHIATRY & NEUROLOGY

## 2020-04-08 PROCEDURE — 99499 UNLISTED E&M SERVICE: CPT | Mod: 95,,, | Performed by: PSYCHIATRY & NEUROLOGY

## 2020-04-08 NOTE — PROGRESS NOTES
NEUROPSYCHOLOGY CONSULT (TELEHEALTH)    Referral Information  Name: Diego Gunter  MRN: [id number]  : 1940  Age: 79 y.o.  Race: White  Gender: male  Billing: See below for details as coding/billing has changed   Telemedicine:   The patient location is: Home  The provider location is: Home  The chief complaint leading to consultation/medical necessity is: cognitive decline  Visit type: Virtual visit with synchronous audio and video. Technology was not available for video visit, so appointment was done via phone.   Face to face time spent with patient: 45 minutes  Each patient to whom he or she provides medical services by telemedicine is:  (1) informed of the relationship between the physician and patient and the respective role of any other health care provider with respect to management of the patient; and (2) notified that he or she may decline to receive medical services by telemedicine and may withdraw from such care at any time.  Consent/Emergency Plan: The patient expressed an understanding of the purpose of the evaluation and consented to all procedures. I informed the patient of limits to confidentiality and discussed an emergency plan.    NEUROPSYCHOLOGICAL EVALUATION - CONFIDENTIAL    REFERRAL SOURCE: Zahraa Elizabeth DO  MEDICAL NECESSITY:  Evaluate cognitive functioning in the setting of cognitive decline.   DATE CONDUCTED: 2020    SOURCES OF INFORMATION:  The following was gathered from a clinical interview with Mr. Diego Gunter and review of the available medical records. Mr. Gunter expressed an understanding of the purpose of the evaluation and consented to all procedures. Total licensed billing psychologists professional time including clinical interview, test administration and interpretation of tests administered by the billing psychologist, integration of test results and other clinical data, preparing the final report, and personally reporting results to the patient    [id number] - 120 minutes    SUMMARY/TREATMENT PLAN   Mr. Gunter will benefit from undergoing comprehensive neuropsychological assessment. At this point, it is encouraging that he remains functionally independent with few reported problems. He also demonstrated intact episodic memory for recent events. His history is also not suggestive of the type of sky forgetting seen in Alzheimer's disease. 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 Assessment of language will be especially important when he returns to clinic, although his speech was fluent with minimal word finding difficulties during this interview. His history is most consistent with cerebrovascular disease superimposed on lower baseline abilities.     Diagnoses  Problem List Items Addressed This Visit        Neuro    Mild cognitive impairment - Primary    Overview     See 4/2020 Neuropsych note         Current Assessment & Plan     Level of Care: Appropriately managed. He remains functionally independent with few reported problems. Recommended that wife monitor medication management to ensure 100% compliance.     Optimize Brain Health: Prioritize physical and social activity. Maintain heart healthy diet and 100% treatment compliance.     Speech Therapy: Mr. Gunter may benefit from speech therapy per changes noted by he and his wife.     Neuropsychology Follow-up: Return to clinic for full testing upon reopening.             Psychiatric    Anxiety    Current Assessment & Plan     Treatment Considerations: Anxiety appears subclinical at this time and does not appear to be impacting his daily functioning. It also seems situationally based due to pandemic. Regardless, continue to monitor as it may warrant treatment in the future.            Thank you for allowing me to participate in Mr. Sifuentes care.  If you have any questions, please contact me at [phone].    Chencho Solis Psy.D., ABPP  Board Certified in  "Clinical Neuropsychology  Department of Neurology    HISTORY OF PRESENT ILLNESS: Mr. Diego Gunter is a 79 y.o., right-handed,  male with 9 years of education who was referred for a neuropsychological evaluation in the setting of cognitive decline. Mr. Gunter wife, Addis, first noticed changes a few years ago, primarily related to language/communication. She noted that he has never been "a talker," describing him as a shy individual who has never initiated conversations. However, she now finds that he has trouble expressing himself in conversation. She can't tell if he has trouble thinking about what he wants to say or if it's an issue of word finding. He also has a tendency to misplace items, such as his phone or keys. He is able to backtrack and find the items, but it takes time. Addis also typically reviews with him the items he needs before he leaves the house. Otherwise, she denied any changes in his daily functioning.     Mr. Gunter acknowledged being worried about dementia. He reported word finding difficulty and notices that he has to stop and think more frequently when in conversation. His report was consistent with his wife's as he also noted a recent instance when he could not find his phone. He was able to remember this event during the interview, including where the phone was. He can forget names of acquaintances.      Neuropsychiatric Symptoms:  Hallucinations: Denied  Delusional/Paranoid Thinking: Denied  Apathy: Denied  Irritability/Agitation: Denied. He has never been an aggressive individual, with no change from baseline.   Disinhibition: Denied  Depression/Labile Mood: Denied   Anxiety: Endorsed. He does seem to worry about his grandson more than in the past.     DAILY FUNCTIONING:  BASIC ADLS:  Feeding: independent, he had problems with swallowing a few years ago, which led to him losing 20-30 pounds over a few months.   Dressing: independent  Bathing: independent, he takes a " "bath at 5pm every afternoon.   Toileting: independent    IADLS: He enjoys staying physically active and was going to the gym on a daily basis prior to pandemic. He currently lifts weights at the house.   Support System: Resides with wife. Grandson is nearby.   Safety Risks:    Falls: Endorsed, 2 years ago on a ice. He fell on his face and injured his eye. No acute abnormality on MRI. He also reported another fall recently when tripping on an uneven part of land.   Appointment Management: independent. He manages his schedule with no instances of forgetting appointments. He writes them in on a calendar.   Medication Compliance: independent, no reported problems. He takes only 1 medication.   Financial Management: Wife has always managed.   Cooking: He cooks with no reported problems.   Driving: He continues to drive with no restrictions. Never been in a MVC. There was a moment 2-3 months ago when he was driving and could not recall where he was going. He was able to remember while still driving and safely made it his destination.     MEDICAL HISTORY: Mr. Gunter  has a past medical history of Dysphagia, colonic polyps, Hyperlipidemia LDL goal <100, Overweight(278.02), Prostate cancer (september 2011), and elevated A1C, but not diabetes. Weight loss due to dysphagia several years ago which improved with therapy. He typically falls asleep between 10:30-11pm. No problems with sleep initiation. He frequently wakes up very early, thinking about what he plans to do that day. In fact, he is constantly thinking about what he has to take care of around the house.     NEUROIMAGING:  10/2019 Brain MRI: "No acute abnormality. Moderate generalized cerebral volume loss. Findings consistent with chronic microvascular ischemic changes. 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 Gunter  reports that he quit smoking cigarettes when his first son was born, over 50 years ago. He denied " a history of substance abuse. He does not drink alcohol over the past 2 years.     CURRENT MEDICATIONS:    Current Outpatient Medications:     ascorbic acid (VITAMIN C) 1000 MG tablet, Take 1,000 mg by mouth once daily.  , Disp: , Rfl:     b complex vitamins capsule, Take 1 capsule by mouth once daily., Disp: , Rfl:     FLUAD 5389-1162, 65 YR UP,,PF, 45 mcg (15 mcg x 3)/0.5 mL Syrg, , Disp: , Rfl:     omega 3-dha-epa-fish oil 1,000 (120-180) mg Cap, Take 1 capsule by mouth once daily.  , Disp: , Rfl:     omeprazole 20 mg TbEC, 1/2 tablet daily in am as needed., Disp: 45 each, Rfl: 0    pravastatin (PRAVACHOL) 10 MG tablet, TAKE 1 TABLET BY MOUTH ONCE DAILY, Disp: 90 tablet, Rfl: 1     PSYCHIATRIC HISTORY: Unremarkable pre-morbid psychiatric history. Wife described him as an individual who always liked staying busy, noting that he never planned on retiring. However, he has adapted well in jail and continues to stay very busy around the house. Mr. Gunter also denied symptoms of depression. His wife stated that putting down their dog of 16 years in 2/2020 was very traumatic, but he is doing well all things considered. They both note a mild increase in anxiety, primarily due to the pandemic and being unable to see his grandchildren and greatgrandchildren. They are used to seeing them regularly and worry about their health. They still talk on the phone regularly.     SUICIDAL IDEATION:  History: Denied.   Active Suicidal Ideation:Denied  Plan/Intent: Denied    FAMILY HISTORY: family history includes Colon cancer in his mother; Dementia in his brother; Diabetes in his brother, brother, maternal aunt, mother, sister, and unknown relative; Heart disease in his father; Lung cancer in his brother; Mental illness in his sister; Myasthenia gravis in his brother; Other in his brother; Parkinsonism in his brother.    PSYCHOSOCIAL HISTORY:   Education:   Level Attained: Completed 9 years of education.    Learning  Difficulties: Denied   Special Education: Denied   Repeated Grade: Endorsed. He denied problems with learning, but noted that he repeated 4th and 5th grade.      Vocation:   Highest Attained: Worked in [employer]. Most recently worked as a  at a  shop. 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    Retired: 2014, Accurately recalled that he retired 6 years ago, and the month.     Relationship Status:   : yes, 1962   Children: 2 sons.     MENTAL STATUS AND OBSERVATIONS:  APPEARANCE: Casually dressed and adequate grooming/hygiene.   ALERTNESS/ORIENTATION: Attentive and alert.   GAIT/MOTOR: Unable to assess.   SENSORY: Unable to assess.   SPEECH/LANGUAGE: Normal in rate, rhythm, tone, and volume. Expressive and receptive language were grossly intact. No clear word finding difficulties during interview.   STATED MOOD/AFFECT: Mood was euthymic.   INTERPERSONAL BEHAVIOR: Rapport was quickly and easily established   THOUGHT PROCESSES: Thoughts seemed logical and goal-directed.     IADL 4/8/2020   Ability to Use Telephone Operates telephone on own initiative, looks up and dials numbers   Shopping Takes care of all shopping needs independently   Food Preparation Plans, prepares, and serves adequate meals independently   Housekeeping Maintains house alone with occasional assistance (heavy work)   Laundry Does personal laundry completely   Mode of Transportation Travels independently on public tranportation or drives own car   Responsibility for Own Medications Is responsible for taking medication in correct dosages at correct time   Ability to Handle Finances Manages financial matters independently (budgets, writes checks, pays rent and bills, goes to bank). Collects and keeps track of income     NPIQ RFS 4/8/2020   WHO IS FILLING OUT FORM? Caregiver   Does this patient have false beliefs, such as thinking that others are stealing from him/her or planning to harm him/her  in some way? No   Does this patient have hallucinations such as false visions or voices? Silva she/he seem to hear or see things that are not present? No   Is the patient resistive to help from others at times, or hard to handle? No   Does the patient seem sad or say that he/she is depressed? No   Does the patient become upset when  from you? Does he/she have any other signs of nervousness such as shortness of breath, sighing, being unable tor elax, or feeling excessively tense? No   Does the patient appear to feel good or act excessively happy? No   Does this patient seem less interested in his/her usual activities or in the activities and plans of others? No   Does this patient seem to act cumpolsively, for example, talking to strangers as if she/he knows them, or saying things that may hurt people's feelings? No   Is the patient impatient and cranky? Does he/she have difficulty coping with delays or waiting for planned activities? No   Does the patient engage in repetitive activities such as pacing around the house, handling buttons, wrapping string, or doing other things repeatedly? No   Does this patient awaken you during the night, rise too early in the morning, or take excessive naps during the day? No   Has the patient lost or gained weight, or had a change in the type of food he/she likes? No     PHQ9 4/8/2020   Total Score 1

## 2020-04-09 PROBLEM — G31.84 MILD COGNITIVE IMPAIRMENT: Status: ACTIVE | Noted: 2020-04-09

## 2020-04-09 NOTE — ASSESSMENT & PLAN NOTE
Level of Care: Appropriately managed. He remains functionally independent with few reported problems. Recommended that wife monitor medication management to ensure 100% compliance.     Optimize Brain Health: Prioritize physical and social activity. Maintain heart healthy diet and 100% treatment compliance.     Speech Therapy: Mr. Gunter may benefit from speech therapy per changes noted by he and his wife.     Neuropsychology Follow-up: Return to clinic for full testing upon reopening.

## 2020-04-09 NOTE — PATIENT INSTRUCTIONS
Mr. Gunter will benefit from undergoing comprehensive neuropsychological assessment. At this point, it is encouraging that he remains functionally independent with few reported problems. He also demonstrated intact episodic memory for recent events. His history is also not suggestive of the type of sky forgetting seen in Alzheimer's disease. 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 Assessment of language will be especially important when he returns to clinic, although his speech was fluent with minimal word finding difficulties during this interview. His history is most consistent with cerebrovascular disease superimposed on lower baseline abilities.     Diagnoses  Problem List Items Addressed This Visit        Neuro    Mild cognitive impairment    Overview     See 4/2020 Neuropsych note         Current Assessment & Plan     Level of Care: Appropriately managed. He remains functionally independent with few reported problems. Recommended that wife monitor medication management to ensure 100% compliance.     Optimize Brain Health: Prioritize physical and social activity. Maintain heart healthy diet and 100% treatment compliance.     Speech Therapy: Mr. Gunter may benefit from speech therapy per changes noted by he and his wife.     Neuropsychology Follow-up: Return to clinic for full testing upon reopening.             Psychiatric    Anxiety    Current Assessment & Plan     Treatment Considerations: Anxiety appears subclinical at this time and does not appear to be impacting his daily functioning. It also seems situationally based due to pandemic. Regardless, continue to monitor as it may warrant treatment in the future.

## 2020-04-09 NOTE — ASSESSMENT & PLAN NOTE
Treatment Considerations: Anxiety appears subclinical at this time and does not appear to be impacting his daily functioning. It also seems situationally based due to pandemic. Regardless, continue to monitor as it may warrant treatment in the future.

## 2020-04-21 DIAGNOSIS — E78.5 HYPERLIPIDEMIA LDL GOAL <100: ICD-10-CM

## 2020-04-21 RX ORDER — PRAVASTATIN SODIUM 10 MG/1
TABLET ORAL
Qty: 90 TABLET | Refills: 1 | Status: SHIPPED | OUTPATIENT
Start: 2020-04-21 | End: 2020-10-22

## 2020-05-15 ENCOUNTER — LAB VISIT (OUTPATIENT)
Dept: LAB | Facility: HOSPITAL | Age: 80
End: 2020-05-15
Attending: INTERNAL MEDICINE
Payer: MEDICARE

## 2020-05-15 ENCOUNTER — TELEPHONE (OUTPATIENT)
Dept: FAMILY MEDICINE | Facility: CLINIC | Age: 80
End: 2020-05-15

## 2020-05-15 DIAGNOSIS — M79.675 PAIN OF TOE OF LEFT FOOT: Primary | ICD-10-CM

## 2020-05-15 DIAGNOSIS — M79.675 PAIN OF TOE OF LEFT FOOT: ICD-10-CM

## 2020-05-15 LAB — URATE SERPL-MCNC: 5.4 MG/DL (ref 3.4–7)

## 2020-05-15 PROCEDURE — 84550 ASSAY OF BLOOD/URIC ACID: CPT

## 2020-05-15 PROCEDURE — 36415 COLL VENOUS BLD VENIPUNCTURE: CPT | Mod: PO

## 2020-05-15 NOTE — TELEPHONE ENCOUNTER
----- Message from Elvira Parsons sent at 5/15/2020  8:56 AM CDT -----  Contact: Self/  [phone]  Type: Patient Call Back    Who called:  Patient    What is the request in detail:  Patient would like a referral placed in the system to see podiatry.  Thank you    Would the patient rather a call back or a response via [facility]?   Call back    Best call back number:   [phone]

## 2020-06-10 ENCOUNTER — OFFICE VISIT (OUTPATIENT)
Dept: NEUROLOGY | Facility: CLINIC | Age: 80
End: 2020-06-10
Payer: MEDICARE

## 2020-06-10 DIAGNOSIS — G31.84 MILD COGNITIVE IMPAIRMENT: Primary | ICD-10-CM

## 2020-06-10 PROCEDURE — 99442 PR PHYSICIAN TELEPHONE EVALUATION 11-20 MIN: CPT | Mod: 95,,, | Performed by: NEUROLOGICAL SURGERY

## 2020-06-10 PROCEDURE — 99442 PR PHYSICIAN TELEPHONE EVALUATION 11-20 MIN: ICD-10-PCS | Mod: 95,,, | Performed by: NEUROLOGICAL SURGERY

## 2020-06-10 NOTE — LETTER
Tiarra 10, 2020      Zarhaa Elizabeth, DO  [address] [facility] [address]  [address]  [facility] [address]           [facility]  [address]  [facility] [address]  Phone: [phone]  Fax: [phone]          Patient: Diego Gunter   MR Number: [id number]   YOB: 1940   Date of Visit: 6/10/2020       Dear Dr. Zahraa Elizabeth:    Thank you for referring Diego Gunter to me for evaluation. Attached you will find relevant portions of my assessment and plan of care.    If you have questions, please do not hesitate to call me. I look forward to following Diego Gunter along with you.    Sincerely,    Neel Jimenez MD    Enclosure  CC:  No Recipients    If you would like to receive this communication electronically, please contact [email or web address] or [phone] to request more information on [facility] access.    For providers and/or their staff who would like to refer a patient to [facility], please contact us through our one-stop-shop provider referral line, [facility], at [phone].    If you feel you have received this communication in error or would no longer like to receive these types of communications, please e-mail [email or web address]

## 2020-06-10 NOTE — PROGRESS NOTES
Established Patient - Audio Only Telehealth Visit     The patient location is:   Home  The chief complaint leading to consultation is:  Memory loss  Visit type: Virtual visit with audio only (telephone)  Total time spent with patient:  17 min       The reason for the audio only service rather than synchronous audio and video virtual visit was related to technical difficulties or patient preference/necessity.     Each patient to whom I provide medical services by telemedicine is:  (1) informed of the relationship between the physician and patient and the respective role of any other health care provider with respect to management of the patient; and (2) notified that they may decline to receive medical services by telemedicine and may withdraw from such care at any time. Patient verbally consented to receive this service via voice-only telephone call.       HPI:  He says he has been doing well overall since he was last seen in clinic.  He was evaluated by neuropsychology and says that since the time of his visit he feels that he has been overall about the same without any significant changes in his level of functioning.  He has been taking his medications as directed without any complaints of side effects     Assessment and plan:   Patient has mild cognitive impairment that is currently stable.  Continue his current medications.  Follow-up in 6 months.                        This service was not originating from a related E/M service provided within the previous 7 days nor will  to an E/M service or procedure within the next 24 hours or my soonest available appointment.  Prevailing standard of care was able to be met in this audio-only visit.

## 2020-07-01 ENCOUNTER — LAB VISIT (OUTPATIENT)
Dept: LAB | Facility: HOSPITAL | Age: 80
End: 2020-07-01
Attending: INTERNAL MEDICINE
Payer: MEDICARE

## 2020-07-01 DIAGNOSIS — Z86.010 PERSONAL HISTORY OF COLONIC POLYPS: Primary | ICD-10-CM

## 2020-07-01 LAB
BASOPHILS # BLD AUTO: 0.04 K/UL (ref 0–0.2)
BASOPHILS NFR BLD: 0.6 % (ref 0–1.9)
DIFFERENTIAL METHOD: ABNORMAL
EOSINOPHIL # BLD AUTO: 0.1 K/UL (ref 0–0.5)
EOSINOPHIL NFR BLD: 2.2 % (ref 0–8)
ERYTHROCYTE [DISTWIDTH] IN BLOOD BY AUTOMATED COUNT: 14.1 % (ref 11.5–14.5)
HCT VFR BLD AUTO: 45.5 % (ref 40–54)
HGB BLD-MCNC: 13.8 G/DL (ref 14–18)
IMM GRANULOCYTES # BLD AUTO: 0.02 K/UL (ref 0–0.04)
IMM GRANULOCYTES NFR BLD AUTO: 0.3 % (ref 0–0.5)
LYMPHOCYTES # BLD AUTO: 1.7 K/UL (ref 1–4.8)
LYMPHOCYTES NFR BLD: 25.6 % (ref 18–48)
MCH RBC QN AUTO: 27.9 PG (ref 27–31)
MCHC RBC AUTO-ENTMCNC: 30.3 G/DL (ref 32–36)
MCV RBC AUTO: 92 FL (ref 82–98)
MONOCYTES # BLD AUTO: 0.6 K/UL (ref 0.3–1)
MONOCYTES NFR BLD: 9.4 % (ref 4–15)
NEUTROPHILS # BLD AUTO: 4 K/UL (ref 1.8–7.7)
NEUTROPHILS NFR BLD: 61.9 % (ref 38–73)
NRBC BLD-RTO: 0 /100 WBC
PLATELET # BLD AUTO: 255 K/UL (ref 150–350)
PMV BLD AUTO: 10.8 FL (ref 9.2–12.9)
RBC # BLD AUTO: 4.95 M/UL (ref 4.6–6.2)
WBC # BLD AUTO: 6.49 K/UL (ref 3.9–12.7)

## 2020-07-01 PROCEDURE — 85025 COMPLETE CBC W/AUTO DIFF WBC: CPT

## 2020-07-01 PROCEDURE — 36415 COLL VENOUS BLD VENIPUNCTURE: CPT | Mod: PO

## 2020-07-07 ENCOUNTER — OFFICE VISIT (OUTPATIENT)
Dept: CARDIOLOGY | Facility: CLINIC | Age: 80
End: 2020-07-07
Payer: MEDICARE

## 2020-07-07 VITALS
WEIGHT: 160.94 LBS | SYSTOLIC BLOOD PRESSURE: 115 MMHG | DIASTOLIC BLOOD PRESSURE: 62 MMHG | HEIGHT: 67 IN | HEART RATE: 57 BPM | OXYGEN SATURATION: 97 % | BODY MASS INDEX: 25.26 KG/M2

## 2020-07-07 DIAGNOSIS — E78.5 HYPERLIPIDEMIA LDL GOAL <100: ICD-10-CM

## 2020-07-07 DIAGNOSIS — I10 HYPERTENSION: ICD-10-CM

## 2020-07-07 DIAGNOSIS — J43.2 CENTRILOBULAR EMPHYSEMA: Primary | ICD-10-CM

## 2020-07-07 DIAGNOSIS — E11.40 CONTROLLED TYPE 2 DIABETES WITH NEUROPATHY: ICD-10-CM

## 2020-07-07 PROCEDURE — 3074F SYST BP LT 130 MM HG: CPT | Mod: CPTII,S$GLB,, | Performed by: INTERNAL MEDICINE

## 2020-07-07 PROCEDURE — 99999 PR PBB SHADOW E&M-EST. PATIENT-LVL III: ICD-10-PCS | Mod: PBBFAC,,, | Performed by: INTERNAL MEDICINE

## 2020-07-07 PROCEDURE — 1101F PT FALLS ASSESS-DOCD LE1/YR: CPT | Mod: CPTII,S$GLB,, | Performed by: INTERNAL MEDICINE

## 2020-07-07 PROCEDURE — 99214 PR OFFICE/OUTPT VISIT, EST, LEVL IV, 30-39 MIN: ICD-10-PCS | Mod: S$GLB,,, | Performed by: INTERNAL MEDICINE

## 2020-07-07 PROCEDURE — 1101F PR PT FALLS ASSESS DOC 0-1 FALLS W/OUT INJ PAST YR: ICD-10-PCS | Mod: CPTII,S$GLB,, | Performed by: INTERNAL MEDICINE

## 2020-07-07 PROCEDURE — 3074F PR MOST RECENT SYSTOLIC BLOOD PRESSURE < 130 MM HG: ICD-10-PCS | Mod: CPTII,S$GLB,, | Performed by: INTERNAL MEDICINE

## 2020-07-07 PROCEDURE — 1126F PR PAIN SEVERITY QUANTIFIED, NO PAIN PRESENT: ICD-10-PCS | Mod: S$GLB,,, | Performed by: INTERNAL MEDICINE

## 2020-07-07 PROCEDURE — 99999 PR PBB SHADOW E&M-EST. PATIENT-LVL III: CPT | Mod: PBBFAC,,, | Performed by: INTERNAL MEDICINE

## 2020-07-07 PROCEDURE — 93000 ELECTROCARDIOGRAM COMPLETE: CPT | Mod: S$GLB,,, | Performed by: INTERNAL MEDICINE

## 2020-07-07 PROCEDURE — 3078F PR MOST RECENT DIASTOLIC BLOOD PRESSURE < 80 MM HG: ICD-10-PCS | Mod: CPTII,S$GLB,, | Performed by: INTERNAL MEDICINE

## 2020-07-07 PROCEDURE — 93000 EKG 12-LEAD: ICD-10-PCS | Mod: S$GLB,,, | Performed by: INTERNAL MEDICINE

## 2020-07-07 PROCEDURE — 3078F DIAST BP <80 MM HG: CPT | Mod: CPTII,S$GLB,, | Performed by: INTERNAL MEDICINE

## 2020-07-07 PROCEDURE — 1159F PR MEDICATION LIST DOCUMENTED IN MEDICAL RECORD: ICD-10-PCS | Mod: S$GLB,,, | Performed by: INTERNAL MEDICINE

## 2020-07-07 PROCEDURE — 1159F MED LIST DOCD IN RCRD: CPT | Mod: S$GLB,,, | Performed by: INTERNAL MEDICINE

## 2020-07-07 PROCEDURE — 1126F AMNT PAIN NOTED NONE PRSNT: CPT | Mod: S$GLB,,, | Performed by: INTERNAL MEDICINE

## 2020-07-07 PROCEDURE — 99214 OFFICE O/P EST MOD 30 MIN: CPT | Mod: S$GLB,,, | Performed by: INTERNAL MEDICINE

## 2020-07-07 NOTE — PROGRESS NOTES
Subjective:    Patient ID:  Diego Gunter is a 79 y.o. male who presents for follow-up of Hyperlipidemia      HPI     HTN, DM, HLD, bradycardia     Referred by Dr Hanna Tracynaomi Gunter is a 76 y.o. male who presents to the clinic today for Diabetes  .  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  He does not check his blood pressures or blood sugars at home.  He reports good dietary habits.  He exercises regularly.  Unfortunately, his dysphagia is worsening again.  He has an appointment set up in the near future for reevaluation with speech therapy.  He recently had blood work done and is here today to discuss the results.  He does complain of some shortness of breath and fatigue.  He denies cardiac chest pain.  No abdominal pain, temperature intolerance, or unexplained changes in his weight.     Denies CP but gets SOB which has worsened some  SOB is at rest - goes to the gym regularly and does cardio without symptoms  No recent cardiac evaluation    Echo 12/12/19  · Concentric left ventricular hypertrophy.  · Normal left ventricular systolic function. The estimated ejection fraction is 60%  · No wall motion abnormalities.  · Normal LV diastolic function.  · Normal right ventricular systolic function.  · Mild pulmonary hypertension present.  · Mild pulmonic regurgitation.  · Normal central venous pressure (3 mm Hg).  · The estimated PA systolic pressure is 30 mm Hg  · Negative bubble study.     Stress echo 8/31/17    1 - Normal left ventricular systolic function (EF 55-60%).     2 - Trivial tricuspid regurgitation.     3 - Mild pulmonic regurgitation.     No evidence of stress induced myocardial ischemia.      Carotid US 11/21/19  1. No significant right or left carotid circulation atheromatous disease and no visual analysis are Doppler findings to suggest significant hemodynamic stenosis.  2. Bilateral  antegrade vertebral flow noted.     3/13/18 Mild dizziness if he stands too quickly  Mild KO  Denies CP  EKG NSR - ok     12/5/19 Seeing neurology for memory issues  MRI with remote infarct  Denies CP or SOB  EKG NSR - ok  BP well controlled  Echo with bubble study to r/o PFO  Clinically no evidence of PAF  Agree with ASA/statin    7/7/20 Denies CP or SOB  EKG sinus bradycardia 58 otherwise ok  Memory issues stable  Losing weight    Review of Systems   Constitution: Negative for decreased appetite.   HENT: Negative for ear discharge.    Eyes: Negative for blurred vision.   Endocrine: Negative for polyphagia.   Skin: Negative for nail changes.   Genitourinary: Negative for bladder incontinence.   Neurological: Negative for aphonia.   Psychiatric/Behavioral: Negative for hallucinations.   Allergic/Immunologic: Negative for hives.        Objective:    Physical Exam   Constitutional: He is oriented to person, place, and time. He appears well-developed and well-nourished.   HENT:   Head: Normocephalic and atraumatic.   Eyes: Pupils are equal, round, and reactive to light. Conjunctivae are normal.   Neck: Normal range of motion. Neck supple.   Cardiovascular: Normal rate, normal heart sounds and intact distal pulses.   Pulmonary/Chest: Effort normal and breath sounds normal.   Abdominal: Soft. Bowel sounds are normal.   Musculoskeletal: Normal range of motion.   Neurological: He is alert and oriented to person, place, and time.   Skin: Skin is warm and dry.         Assessment:       1. Centrilobular emphysema    2. Controlled type 2 diabetes with neuropathy    3. Hyperlipidemia LDL goal <100         Plan:       Cardiac stable  OV 1 year

## 2020-07-17 ENCOUNTER — PATIENT OUTREACH (OUTPATIENT)
Dept: ADMINISTRATIVE | Facility: HOSPITAL | Age: 80
End: 2020-07-17

## 2020-08-07 DIAGNOSIS — E11.40 CONTROLLED TYPE 2 DIABETES WITH NEUROPATHY: ICD-10-CM

## 2020-08-11 ENCOUNTER — LAB VISIT (OUTPATIENT)
Dept: LAB | Facility: HOSPITAL | Age: 80
End: 2020-08-11
Attending: INTERNAL MEDICINE
Payer: MEDICARE

## 2020-08-11 DIAGNOSIS — E11.40 CONTROLLED TYPE 2 DIABETES WITH NEUROPATHY: ICD-10-CM

## 2020-08-11 LAB
ALBUMIN SERPL BCP-MCNC: 3.8 G/DL (ref 3.5–5.2)
ALP SERPL-CCNC: 74 U/L (ref 55–135)
ALT SERPL W/O P-5'-P-CCNC: 22 U/L (ref 10–44)
ANION GAP SERPL CALC-SCNC: 8 MMOL/L (ref 8–16)
AST SERPL-CCNC: 25 U/L (ref 10–40)
BILIRUB SERPL-MCNC: 0.5 MG/DL (ref 0.1–1)
BUN SERPL-MCNC: 12 MG/DL (ref 8–23)
CALCIUM SERPL-MCNC: 10 MG/DL (ref 8.7–10.5)
CHLORIDE SERPL-SCNC: 102 MMOL/L (ref 95–110)
CO2 SERPL-SCNC: 30 MMOL/L (ref 23–29)
CREAT SERPL-MCNC: 1 MG/DL (ref 0.5–1.4)
EST. GFR  (AFRICAN AMERICAN): >60 ML/MIN/1.73 M^2
EST. GFR  (NON AFRICAN AMERICAN): >60 ML/MIN/1.73 M^2
GLUCOSE SERPL-MCNC: 159 MG/DL (ref 70–110)
POTASSIUM SERPL-SCNC: 5.2 MMOL/L (ref 3.5–5.1)
PROT SERPL-MCNC: 7.7 G/DL (ref 6–8.4)
SODIUM SERPL-SCNC: 140 MMOL/L (ref 136–145)

## 2020-08-11 PROCEDURE — 36415 COLL VENOUS BLD VENIPUNCTURE: CPT | Mod: PO

## 2020-08-11 PROCEDURE — 80053 COMPREHEN METABOLIC PANEL: CPT

## 2020-08-14 ENCOUNTER — TELEPHONE (OUTPATIENT)
Dept: FAMILY MEDICINE | Facility: CLINIC | Age: 80
End: 2020-08-14

## 2020-08-14 ENCOUNTER — LAB VISIT (OUTPATIENT)
Dept: LAB | Facility: HOSPITAL | Age: 80
End: 2020-08-14
Attending: INTERNAL MEDICINE
Payer: MEDICARE

## 2020-08-14 DIAGNOSIS — E11.40 CONTROLLED TYPE 2 DIABETES WITH NEUROPATHY: ICD-10-CM

## 2020-08-14 DIAGNOSIS — Z11.59 NEED FOR HEPATITIS C SCREENING TEST: Primary | ICD-10-CM

## 2020-08-14 DIAGNOSIS — Z11.59 NEED FOR HEPATITIS C SCREENING TEST: ICD-10-CM

## 2020-08-14 LAB
ALBUMIN/CREAT UR: 9.3 UG/MG (ref 0–30)
CREAT UR-MCNC: 43 MG/DL (ref 23–375)
MICROALBUMIN UR DL<=1MG/L-MCNC: 4 UG/ML

## 2020-08-14 PROCEDURE — 82043 UR ALBUMIN QUANTITATIVE: CPT

## 2020-08-14 NOTE — TELEPHONE ENCOUNTER
Spoke with the patient and scheduled his urine and non fasting labs.  Patient verbalized understandings.            ----- Message from Portia Ferreira MD sent at 8/14/2020 10:47 AM CDT -----  Please see if patient can do labs for office visit on Monday.

## 2020-08-17 ENCOUNTER — OFFICE VISIT (OUTPATIENT)
Dept: FAMILY MEDICINE | Facility: CLINIC | Age: 80
End: 2020-08-17
Payer: MEDICARE

## 2020-08-17 VITALS
DIASTOLIC BLOOD PRESSURE: 64 MMHG | HEART RATE: 53 BPM | WEIGHT: 161.25 LBS | SYSTOLIC BLOOD PRESSURE: 108 MMHG | HEIGHT: 67 IN | TEMPERATURE: 97 F | BODY MASS INDEX: 25.31 KG/M2 | OXYGEN SATURATION: 99 %

## 2020-08-17 DIAGNOSIS — A31.0 MAI (MYCOBACTERIUM AVIUM-INTRACELLULARE): ICD-10-CM

## 2020-08-17 DIAGNOSIS — J43.2 CENTRILOBULAR EMPHYSEMA: ICD-10-CM

## 2020-08-17 DIAGNOSIS — E11.40 CONTROLLED TYPE 2 DIABETES WITH NEUROPATHY: Primary | ICD-10-CM

## 2020-08-17 DIAGNOSIS — J98.4 CAVITARY LUNG DISEASE: ICD-10-CM

## 2020-08-17 DIAGNOSIS — E78.5 HYPERLIPIDEMIA LDL GOAL <100: ICD-10-CM

## 2020-08-17 PROCEDURE — 3074F PR MOST RECENT SYSTOLIC BLOOD PRESSURE < 130 MM HG: ICD-10-PCS | Mod: CPTII,S$GLB,, | Performed by: INTERNAL MEDICINE

## 2020-08-17 PROCEDURE — 3078F DIAST BP <80 MM HG: CPT | Mod: CPTII,S$GLB,, | Performed by: INTERNAL MEDICINE

## 2020-08-17 PROCEDURE — 99999 PR PBB SHADOW E&M-EST. PATIENT-LVL III: CPT | Mod: PBBFAC,,, | Performed by: INTERNAL MEDICINE

## 2020-08-17 PROCEDURE — 99214 OFFICE O/P EST MOD 30 MIN: CPT | Mod: S$GLB,,, | Performed by: INTERNAL MEDICINE

## 2020-08-17 PROCEDURE — 99999 PR PBB SHADOW E&M-EST. PATIENT-LVL III: ICD-10-PCS | Mod: PBBFAC,,, | Performed by: INTERNAL MEDICINE

## 2020-08-17 PROCEDURE — 1159F MED LIST DOCD IN RCRD: CPT | Mod: S$GLB,,, | Performed by: INTERNAL MEDICINE

## 2020-08-17 PROCEDURE — 1159F PR MEDICATION LIST DOCUMENTED IN MEDICAL RECORD: ICD-10-PCS | Mod: S$GLB,,, | Performed by: INTERNAL MEDICINE

## 2020-08-17 PROCEDURE — 99214 PR OFFICE/OUTPT VISIT, EST, LEVL IV, 30-39 MIN: ICD-10-PCS | Mod: S$GLB,,, | Performed by: INTERNAL MEDICINE

## 2020-08-17 PROCEDURE — 1126F PR PAIN SEVERITY QUANTIFIED, NO PAIN PRESENT: ICD-10-PCS | Mod: S$GLB,,, | Performed by: INTERNAL MEDICINE

## 2020-08-17 PROCEDURE — 1101F PT FALLS ASSESS-DOCD LE1/YR: CPT | Mod: CPTII,S$GLB,, | Performed by: INTERNAL MEDICINE

## 2020-08-17 PROCEDURE — 1101F PR PT FALLS ASSESS DOC 0-1 FALLS W/OUT INJ PAST YR: ICD-10-PCS | Mod: CPTII,S$GLB,, | Performed by: INTERNAL MEDICINE

## 2020-08-17 PROCEDURE — 3074F SYST BP LT 130 MM HG: CPT | Mod: CPTII,S$GLB,, | Performed by: INTERNAL MEDICINE

## 2020-08-17 PROCEDURE — 1126F AMNT PAIN NOTED NONE PRSNT: CPT | Mod: S$GLB,,, | Performed by: INTERNAL MEDICINE

## 2020-08-17 PROCEDURE — 3078F PR MOST RECENT DIASTOLIC BLOOD PRESSURE < 80 MM HG: ICD-10-PCS | Mod: CPTII,S$GLB,, | Performed by: INTERNAL MEDICINE

## 2020-08-17 NOTE — PROGRESS NOTES
HISTORY OF PRESENT ILLNESS:  Diego Gunter is a 79 y.o. male who presents to the clinic today for Diabetes  .   Presents to clinic today for follow-up his type 2 diabetes mellitus, hyperlipidemia, and emphysema.  He states he is trying to stay active during this COVID-19 pandemic.  He goes for regular walks and still cuts his grass.  He denies any worsening respiratory symptoms. The patient reports compliance with current medication- patient denies missing any  medication doses. The patient denies any problems with cardiac chest pain, dizziness, palpitations, orthopnea, or lower extremity edema.  He recently did blood work and is here today to discuss the results.        PAST MEDICAL HISTORY:  Past Medical History:   Diagnosis Date    Dysphagia     Hx of colonic polyps     followed by GI. Dr. Pham    Hyperlipidemia LDL goal <100     Overweight(278.02)     Prostate cancer september 2011    followed by urology, Dr. Rogers    Type II or unspecified type diabetes mellitus without mention of complication, not stated as uncontrolled     diet controlled       PAST SURGICAL HISTORY:  Past Surgical History:   Procedure Laterality Date    BRONCHOSCOPY N/A 10/15/2018    Procedure: BRONCHOSCOPY;  Surgeon: [facility] Diagnostic Provider;  Location: [facility] OR [address] [place] [address];  Service: Anesthesiology;  Laterality: N/A;    CATARACT EXTRACTION, BILATERAL  2014    PROSTATECTOMY         SOCIAL HISTORY:  Social History     Socioeconomic History    Marital status:      Spouse name: Not on file    Number of children: 2    Years of education: Not on file    Highest education level: Not on file   Occupational History    Occupation: tool    Social Needs    Financial resource strain: Not on file    Food insecurity     Worry: Not on file     Inability: Not on file    Transportation needs     Medical: Not on file     Non-medical: Not on file   Tobacco Use    Smoking status: Former Smoker     Packs/day: 0.25      Years: 5.00     Pack years: 1.25     Quit date: 10/2/1962     Years since quittin.9    Smokeless tobacco: Never Used    Tobacco comment: quit age 23   Substance and Sexual Activity    Alcohol use: Yes     Comment: beer occasionally    Drug use: No    Sexual activity: Yes     Partners: Female   Lifestyle    Physical activity     Days per week: Not on file     Minutes per session: Not on file    Stress: Not at all   Relationships    Social connections     Talks on phone: Not on file     Gets together: Not on file     Attends Caodaism service: Not on file     Active member of club or organization: Not on file     Attends meetings of clubs or organizations: Not on file     Relationship status: Not on file   Other Topics Concern    Not on file   Social History Narrative    Not on file       FAMILY HISTORY:  Family History   Problem Relation Age of Onset    Colon cancer Mother     Diabetes Mother     Heart disease Father     Mental illness Sister     Diabetes Sister     Other Brother         polio as a child    Diabetes Unknown     Diabetes Brother     Myasthenia gravis Brother     Parkinsonism Brother     Diabetes Brother     Dementia Brother     Lung cancer Brother         smoker    Diabetes Maternal Aunt        ALLERGIES AND MEDICATIONS: updated and reviewed.  Review of patient's allergies indicates:  No Known Allergies  Medication List with Changes/Refills   Current Medications    ASCORBIC ACID (VITAMIN C) 1000 MG TABLET    Take 1,000 mg by mouth once daily.      B COMPLEX VITAMINS CAPSULE    Take 1 capsule by mouth once daily.    OMEGA 3-DHA-EPA-FISH OIL 1,000 (120-180) MG CAP    Take 1 capsule by mouth once daily.      OMEPRAZOLE 20 MG TBEC    1/2 tablet daily in am as needed.    PRAVASTATIN (PRAVACHOL) 10 MG TABLET    Take 1 tablet by mouth once daily   Discontinued Medications    FLUAD 2812-5028, 65 YR UP,,PF, 45 MCG (15 MCG X 3)/0.5 ML SYRG             CARE TEAM:  Patient Care  "Team:  Portia Ferreira MD as PCP - General (Internal Medicine)  Dustin Mccarthy III, MD as Consulting Physician (Orthopedic Surgery)  Pierre Rogers MD as Consulting Physician (Urology)  Burak Gurrola MD as Consulting Physician (Pulmonary Disease)  Theo Alvares MD as Consulting Physician (Infectious Diseases)  Suzan Glass LPN as Licensed Practical Nurse  Gilberto Silva OD as Consulting Physician (Optometry)  Thien Joy MD as Consulting Physician (Gastroenterology)         REVIEW OF SYSTEMS:  Review of Systems   Constitutional: Negative for chills, fatigue, fever and unexpected weight change.   HENT: Negative for congestion, ear pain, sore throat and voice change.    Eyes: Negative for photophobia, pain, discharge and visual disturbance.   Respiratory: Positive for cough (- chronic; stable). Negative for shortness of breath and wheezing.    Cardiovascular: Negative for chest pain, palpitations and leg swelling.   Gastrointestinal: Negative for abdominal pain, blood in stool, constipation, diarrhea, nausea and vomiting.   Genitourinary: Negative for dysuria and frequency.   Musculoskeletal: Positive for arthralgias. Negative for gait problem, joint swelling and neck stiffness.   Skin: Negative for color change and rash.   Neurological: Negative for seizures, weakness and headaches.   Hematological: Negative for adenopathy. Does not bruise/bleed easily.   Psychiatric/Behavioral: Negative for behavioral problems and dysphoric mood. The patient is not nervous/anxious.         Fair stable memory.         PHYSICAL EXAM:  Vitals:    08/17/20 0955   BP: 108/64   Pulse: (!) 53   Temp: 97.4 °F (36.3 °C)     Weight: 73.1 kg (161 lb 4.3 oz)   Height: 5' 7" (170.2 cm)   Body mass index is 25.26 kg/m².      General appearance - alert, well appearing, and in no distress  Psychiatric - alert, oriented to person, place, and time, normal mood, behavior, speech, dress, motor activity, and thought " processes  Eyes - pupils equal and reactive, extraocular eye movements intact, sclera anicteric  Mouth - not examined; patient wearing mask due to Covid 19 pandemic  Neck - supple, no significant adenopathy, carotids upstroke normal bilaterally, no bruits, thyroid exam: thyroid is normal in size without nodules or tenderness  Lymphatics - no palpable cervical lymphadenopathy  Chest - clear to auscultation, no wheezes, rales or rhonchi, symmetric air entry  Heart - normal rate and regular rhythm, no gallops noted  Neurological - alert, normal speech, no focal findings or movement disorder noted, cranial nerves II through XII intact  Musculoskeletal - patient noted to have Mild-Moderate osteoarthritic changes to both knee joints. No joint effusions noted., no muscular tenderness noted  Extremities - peripheral pulses normal, no pedal edema, no clubbing or cyanosis  Skin - normal coloration and turgor, no rashes, no suspicious skin lesions noted      Labs:  Lab Results   Component Value Date    HGBA1C 5.7 (H) 08/14/2020    HGBA1C 6.0 (H) 02/26/2020    HGBA1C 5.6 08/29/2019      Lab Results   Component Value Date    CHOL 144 02/26/2020    CHOL 133 02/14/2019    CHOL 142 01/23/2018     Lab Results   Component Value Date    LDLCALC 87.4 02/26/2020    LDLCALC 74.6 02/14/2019    LDLCALC 80.4 01/23/2018         ASSESSMENT AND PLAN:  1. Controlled type 2 diabetes with neuropathy  Diabetes is under good control control at this time for age and comorbid conditions. We discussed diabetic diet and regular exercise. We discussed home blood sugar monitoring, if appropriate - the patient does not need to test daily but can test only as needed. We discussed low sugar/low carbohydrate diet and regular exercise to prevent progression. No need for prescription medication at this time. Recheck A1c in 6 months.  Diabetic complications addressed: Neuropathy pain controlled.  Patient was counseled on the need for yearly eye exam to screen  for/monitor diabetic retinopathy and yearly diabetic foot exam.    2. Hyperlipidemia LDL goal <100  We discussed low fat diet and regular exercise.The current medical regimen is effective;  continue present plan and medications.     3. Centrilobular emphysema/4. Cavitary lung disease/5. HILLARY (mycobacterium avium-intracellulare)  The current medical regimen is effective;  continue present plan and medications.   Followed by: Pulmonology.          No orders of the defined types were placed in this encounter.     Follow up in about 6 months (around 2/17/2021), or if symptoms worsen or fail to improve, for annual exam. or sooner as needed.

## 2020-11-19 ENCOUNTER — LAB VISIT (OUTPATIENT)
Dept: LAB | Facility: HOSPITAL | Age: 80
End: 2020-11-19
Attending: NEUROLOGICAL SURGERY
Payer: MEDICARE

## 2020-11-19 ENCOUNTER — OFFICE VISIT (OUTPATIENT)
Dept: NEUROLOGY | Facility: CLINIC | Age: 80
End: 2020-11-19
Payer: MEDICARE

## 2020-11-19 VITALS
WEIGHT: 162.5 LBS | SYSTOLIC BLOOD PRESSURE: 130 MMHG | BODY MASS INDEX: 25.51 KG/M2 | HEART RATE: 60 BPM | HEIGHT: 67 IN | DIASTOLIC BLOOD PRESSURE: 67 MMHG

## 2020-11-19 DIAGNOSIS — G31.84 MILD COGNITIVE IMPAIRMENT: Primary | ICD-10-CM

## 2020-11-19 DIAGNOSIS — G31.84 MILD COGNITIVE IMPAIRMENT: ICD-10-CM

## 2020-11-19 DIAGNOSIS — R41.3 MEMORY CHANGES: ICD-10-CM

## 2020-11-19 LAB
TSH SERPL DL<=0.005 MIU/L-ACNC: 2.88 UIU/ML (ref 0.4–4)
VIT B12 SERPL-MCNC: 501 PG/ML (ref 210–950)

## 2020-11-19 PROCEDURE — 1101F PR PT FALLS ASSESS DOC 0-1 FALLS W/OUT INJ PAST YR: ICD-10-PCS | Mod: CPTII,S$GLB,, | Performed by: NEUROLOGICAL SURGERY

## 2020-11-19 PROCEDURE — 3288F PR FALLS RISK ASSESSMENT DOCUMENTED: ICD-10-PCS | Mod: CPTII,S$GLB,, | Performed by: NEUROLOGICAL SURGERY

## 2020-11-19 PROCEDURE — 3078F DIAST BP <80 MM HG: CPT | Mod: CPTII,S$GLB,, | Performed by: NEUROLOGICAL SURGERY

## 2020-11-19 PROCEDURE — 1126F AMNT PAIN NOTED NONE PRSNT: CPT | Mod: S$GLB,,, | Performed by: NEUROLOGICAL SURGERY

## 2020-11-19 PROCEDURE — 99999 PR PBB SHADOW E&M-EST. PATIENT-LVL III: ICD-10-PCS | Mod: PBBFAC,,, | Performed by: NEUROLOGICAL SURGERY

## 2020-11-19 PROCEDURE — 1159F MED LIST DOCD IN RCRD: CPT | Mod: S$GLB,,, | Performed by: NEUROLOGICAL SURGERY

## 2020-11-19 PROCEDURE — 83921 ORGANIC ACID SINGLE QUANT: CPT

## 2020-11-19 PROCEDURE — 84443 ASSAY THYROID STIM HORMONE: CPT

## 2020-11-19 PROCEDURE — 1157F ADVNC CARE PLAN IN RCRD: CPT | Mod: S$GLB,,, | Performed by: NEUROLOGICAL SURGERY

## 2020-11-19 PROCEDURE — 3078F PR MOST RECENT DIASTOLIC BLOOD PRESSURE < 80 MM HG: ICD-10-PCS | Mod: CPTII,S$GLB,, | Performed by: NEUROLOGICAL SURGERY

## 2020-11-19 PROCEDURE — 99215 OFFICE O/P EST HI 40 MIN: CPT | Mod: S$GLB,,, | Performed by: NEUROLOGICAL SURGERY

## 2020-11-19 PROCEDURE — 3288F FALL RISK ASSESSMENT DOCD: CPT | Mod: CPTII,S$GLB,, | Performed by: NEUROLOGICAL SURGERY

## 2020-11-19 PROCEDURE — 1126F PR PAIN SEVERITY QUANTIFIED, NO PAIN PRESENT: ICD-10-PCS | Mod: S$GLB,,, | Performed by: NEUROLOGICAL SURGERY

## 2020-11-19 PROCEDURE — 99999 PR PBB SHADOW E&M-EST. PATIENT-LVL III: CPT | Mod: PBBFAC,,, | Performed by: NEUROLOGICAL SURGERY

## 2020-11-19 PROCEDURE — 3075F SYST BP GE 130 - 139MM HG: CPT | Mod: CPTII,S$GLB,, | Performed by: NEUROLOGICAL SURGERY

## 2020-11-19 PROCEDURE — 1157F PR ADVANCE CARE PLAN OR EQUIV PRESENT IN MEDICAL RECORD: ICD-10-PCS | Mod: S$GLB,,, | Performed by: NEUROLOGICAL SURGERY

## 2020-11-19 PROCEDURE — 99215 PR OFFICE/OUTPT VISIT, EST, LEVL V, 40-54 MIN: ICD-10-PCS | Mod: S$GLB,,, | Performed by: NEUROLOGICAL SURGERY

## 2020-11-19 PROCEDURE — 1159F PR MEDICATION LIST DOCUMENTED IN MEDICAL RECORD: ICD-10-PCS | Mod: S$GLB,,, | Performed by: NEUROLOGICAL SURGERY

## 2020-11-19 PROCEDURE — 3075F PR MOST RECENT SYSTOLIC BLOOD PRESS GE 130-139MM HG: ICD-10-PCS | Mod: CPTII,S$GLB,, | Performed by: NEUROLOGICAL SURGERY

## 2020-11-19 PROCEDURE — 1101F PT FALLS ASSESS-DOCD LE1/YR: CPT | Mod: CPTII,S$GLB,, | Performed by: NEUROLOGICAL SURGERY

## 2020-11-19 PROCEDURE — 82607 VITAMIN B-12: CPT

## 2020-11-19 RX ORDER — OMEGA-3 FATTY ACIDS 1000 MG
2 CAPSULE ORAL
COMMUNITY
End: 2022-05-13

## 2020-11-19 RX ORDER — ASPIRIN 81 MG/1
81 TABLET ORAL
COMMUNITY
End: 2022-05-13

## 2020-11-19 RX ORDER — A/SINGAPORE/GP1908/2015 IVR-180 (AN A/MICHIGAN/45/2015 (H1N1)PDM09-LIKE VIRUS, A/HONG KONG/4801/2014, NYMC X-263B (H3N2) (AN A/HONG KONG/4801/2014-LIKE VIRUS), AND B/BRISBANE/60/2008, WILD TYPE (A B/BRISBANE/60/2008-LIKE VIRUS) 15; 15; 15 UG/.5ML; UG/.5ML; UG/.5ML
INJECTION, SUSPENSION INTRAMUSCULAR
Status: ON HOLD | COMMUNITY
Start: 2020-10-19 | End: 2021-02-04 | Stop reason: HOSPADM

## 2020-11-19 NOTE — PROGRESS NOTES
Chief Complaint   Patient presents with    Memory Loss        Diego Gunter is a 80 y.o. male with a history of multiple medical diagnoses as listed below that presents for evaluation of memory loss, speech difficulty, and history of stroke.  The patient is accompanied by his wife who helps to provide the history.  He has been having some difficulty with speaking as he has been unable to express himself clearly or quickly.  He says he feels like he knows what he wants to say but cannot get his words out to express himself quite frequently.  He says that specially when he has been around other couples he finds it has been hard to get his words in because the conversation moves too quickly for him to keep up.  In the past neuropsychology evaluation recommended speech therapy to address these difficulties with speaking but this was never set up.  He has been able to care for his own activities of daily living and his instrumental activities of daily living without any assistance.  He has been taking his medication as directed with no complaints of side effects.     PAST MEDICAL HISTORY:  Past Medical History:   Diagnosis Date    Dysphagia     Hx of colonic polyps     followed by GI. Dr. Pham    Hyperlipidemia LDL goal <100     Overweight(278.02)     Prostate cancer september 2011    followed by urology, Dr. Rogers    Type II or unspecified type diabetes mellitus without mention of complication, not stated as uncontrolled     diet controlled       PAST SURGICAL HISTORY:  Past Surgical History:   Procedure Laterality Date    BRONCHOSCOPY N/A 10/15/2018    Procedure: BRONCHOSCOPY;  Surgeon: [facility];  Location: [facility] OR [address] [place] [address];  Service: Anesthesiology;  Laterality: N/A;    CATARACT EXTRACTION, BILATERAL  2014    PROSTATECTOMY         SOCIAL HISTORY:  Social History     Socioeconomic History    Marital status:      Spouse name: Not on file    Number of children: 2    Years of  education: Not on file    Highest education level: Not on file   Occupational History    Occupation: tool    Social Needs    Financial resource strain: Not on file    Food insecurity     Worry: Not on file     Inability: Not on file    Transportation needs     Medical: Not on file     Non-medical: Not on file   Tobacco Use    Smoking status: Former Smoker     Packs/day: 0.25     Years: 5.00     Pack years: 1.25     Quit date: 10/2/1962     Years since quittin.1    Smokeless tobacco: Never Used    Tobacco comment: quit age 23   Substance and Sexual Activity    Alcohol use: Yes     Comment: beer occasionally    Drug use: No    Sexual activity: Yes     Partners: Female   Lifestyle    Physical activity     Days per week: Not on file     Minutes per session: Not on file    Stress: Not at all   Relationships    Social connections     Talks on phone: Not on file     Gets together: Not on file     Attends Sabianism service: Not on file     Active member of club or organization: Not on file     Attends meetings of clubs or organizations: Not on file     Relationship status: Not on file   Other Topics Concern    Not on file   Social History Narrative    Not on file       FAMILY HISTORY:  Family History   Problem Relation Age of Onset    Colon cancer Mother     Diabetes Mother     Heart disease Father     Mental illness Sister     Diabetes Sister     Other Brother         polio as a child    Diabetes Unknown     Diabetes Brother     Myasthenia gravis Brother     Parkinsonism Brother     Diabetes Brother     Dementia Brother     Lung cancer Brother         smoker    Diabetes Maternal Aunt        ALLERGIES AND MEDICATIONS: updated and reviewed.  Review of patient's allergies indicates:  No Known Allergies  Current Outpatient Medications   Medication Sig Dispense Refill    ascorbic acid (VITAMIN C) 1000 MG tablet Take 1,000 mg by mouth once daily.        aspirin (ECOTRIN) 81 MG EC  tablet Take 81 mg by mouth.      b complex vitamins capsule Take 1 capsule by mouth once daily.      FLUAD QUAD 2020-21,65Y UP,,PF, 60 mcg (15 mcg x 4)/0.5 mL Syrg PHARMACY ADMINISTERED      omega 3-dha-epa-fish oil 1,000 (120-180) mg Cap Take 1 capsule by mouth once daily.        omega-3 fatty acids 1,000 mg Cap Take 2 g by mouth.      omeprazole 20 mg TbEC 1/2 tablet daily in am as needed. 45 each 0    pravastatin (PRAVACHOL) 10 MG tablet Take 1 tablet by mouth once daily 90 tablet 0     No current facility-administered medications for this visit.        Review of Systems   Constitutional: Negative for activity change, fatigue and unexpected weight change.   HENT: Negative for trouble swallowing and voice change.    Eyes: Negative for photophobia, pain and visual disturbance.   Respiratory: Negative for apnea and shortness of breath.    Cardiovascular: Negative for chest pain and palpitations.   Gastrointestinal: Negative for constipation, nausea and vomiting.   Genitourinary: Negative for difficulty urinating.   Musculoskeletal: Negative for arthralgias, back pain, gait problem, myalgias and neck pain.   Skin: Negative for color change and rash.   Neurological: Positive for speech difficulty. Negative for dizziness, seizures, syncope, weakness, light-headedness, numbness and headaches.   Psychiatric/Behavioral: Negative for agitation, behavioral problems and confusion.       Neurologic Exam     Mental Status   Oriented to person, place, and time.   Registration: recalls 3 of 3 objects.   Attention: normal. Concentration: normal.   Speech: speech is normal   Level of consciousness: alert  Knowledge: good.     Cranial Nerves     CN II   Right visual field deficit: none  Left visual field deficit: none     CN III, IV, VI   Pupils are equal, round, and reactive to light.  Extraocular motions are normal.   Right pupil: Size: 3 mm. Shape: regular.   Left pupil: Size: 3 mm. Shape: regular.   CN III: no CN III  palsy  CN VI: no CN VI palsy  Nystagmus: none   Diplopia: none  Ophthalmoparesis: none  Upgaze: normal  Downgaze: normal  Conjugate gaze: present    CN VII   Facial expression full, symmetric.   Right facial weakness: none  Left facial weakness: none    CN VIII   CN VIII normal.     CN XI   CN XI normal.     CN XII   CN XII normal.   Tongue deviation: none    Motor Exam   Muscle bulk: normal  Overall muscle tone: normal  Right arm tone: normal  Left arm tone: normal  Right leg tone: normal  Left leg tone: normal    Gait, Coordination, and Reflexes     Gait  Gait: normal    Coordination   Finger to nose coordination: normal    Tremor   Resting tremor: absent    Reflexes   Right brachioradialis: 2+  Left brachioradialis: 2+  Right biceps: 2+  Left biceps: 2+  Right triceps: 2+  Left triceps: 2+  Right patellar: 3+  Left patellar: 3+      Physical Exam  Constitutional:       Appearance: He is well-developed.   HENT:      Head: Normocephalic and atraumatic.   Eyes:      Extraocular Movements: EOM normal.      Pupils: Pupils are equal, round, and reactive to light.   Pulmonary:      Effort: Pulmonary effort is normal. No respiratory distress.   Musculoskeletal: Normal range of motion.   Neurological:      Mental Status: He is alert and oriented to person, place, and time.      Coordination: Finger-Nose-Finger Test normal.      Gait: Gait is intact.      Deep Tendon Reflexes:      Reflex Scores:       Tricep reflexes are 2+ on the right side and 2+ on the left side.       Bicep reflexes are 2+ on the right side and 2+ on the left side.       Brachioradialis reflexes are 2+ on the right side and 2+ on the left side.       Patellar reflexes are 3+ on the right side and 3+ on the left side.  Psychiatric:         Speech: Speech normal.         Behavior: Behavior normal.         Vitals:    11/19/20 0837   BP: 130/67   BP Location: Right arm   Patient Position: Sitting   BP Method: Large (Automatic)   Pulse: 60   Weight: 73.7  "kg (162 lb 7.7 oz)   Height: 5' 7" (1.702 m)       Assessment & Plan:    Problem List Items Addressed This Visit     Mild cognitive impairment - Primary    Overview     See 4/2020 Neuropsych note         Relevant Orders    Ambulatory referral/consult to Speech Therapy    Methylmalonic Acid, Serum    Vitamin B12    TSH      Other Visit Diagnoses     Memory changes         Relevant Orders    Vitamin B12        More than 50% of this 45 minute encounter was spent in counseling and coordinating care of mild neuro cognitive disorder.    Follow-up: Follow up in about 3 months (around 2/19/2021).    This note was done with the assistance of voice recognition software. Some errors may be present after proofreading.        "

## 2020-11-24 LAB — METHYLMALONATE SERPL-SCNC: 0.22 UMOL/L

## 2020-11-24 NOTE — PROGRESS NOTES
Please see evaluation in POC.     BRAEDEN Lowery, CCC-SLP  Speech Language Pathologist   11/25/2020

## 2020-11-25 ENCOUNTER — CLINICAL SUPPORT (OUTPATIENT)
Dept: REHABILITATION | Facility: HOSPITAL | Age: 80
End: 2020-11-25
Attending: NEUROLOGICAL SURGERY
Payer: MEDICARE

## 2020-11-25 DIAGNOSIS — R41.841 COGNITIVE COMMUNICATION DEFICIT: ICD-10-CM

## 2020-11-25 DIAGNOSIS — G31.84 MILD COGNITIVE IMPAIRMENT: ICD-10-CM

## 2020-11-25 PROCEDURE — 96125 COGNITIVE TEST BY HC PRO: CPT | Mod: PN | Performed by: SPEECH-LANGUAGE PATHOLOGIST

## 2020-11-25 NOTE — PATIENT INSTRUCTIONS
"Memory Strategies:   · Write: make a list or utilize a calendar   · Repeat:  Repeat information out loud or internally   · Associate:  Connect desired information with something you already recall. For example, when in the grocery, group items by meal or taste (sweet vs salty) or by category (vegetables, cold items, etc).  · Picture: try to mentally picture what you are trying to remember  · Mental Rehearsal: think through how you're going to complete a task before completing it.     JAKOB Huntley, ROSSANA Collier, & YOLANDA Leiva (2012). Cognitive rehabilitation manual: Translating evidence-based recommendations into practice. [place], [place]: [facility].  ·     Word Retrieval Strategies:   · Visualization: try to see the thing in your head. Concentrate on the details of the picture and sometimes the word will come  · Association:  Think of things that go with the word. For example, bread goes with butter, so if you are trying to think of the word "butter", you may think of the word "bread".  · Gesture: use the hand motion you would use with the thing you are thinking of. For example, if you are thinking of the word "wash", you might make a motion as if you are washing your hands.   · Description:  Describe the thing to the other person you are talking to. Even if the word does not come to you, the other person may be able to guess what you are trying to say.   · First Letter or Sound:  Try to think of the first letter of the word. Sometimes you can think of the first letter even if you cannot think of the word. The letter may "lead" you to the word. You might even try going down the alphabet to find the first letter.    WRAP Write, Repeat, Associate, Picture - group of strategies for recall   Mental Rehearsal For "thinking through" your plan for the next day   Lists Create a list each night for the next day   Sticky Notes Bright and with specific instructions   Alarms  For things you need to remember regularly " (i.e. Meals)   Mental Review How did I do with my strategies today?   Goal  Plan  Action  Review Goal - what is my goal?  Plan - how will I do it?  Action - try to complete goal  Review- how did it go?

## 2020-11-25 NOTE — PLAN OF CARE
"Outpatient Neurological Rehabilitation  Speech and Language Therapy Evaluation    Date: 11/25/2020     Name: Diego Gunter   MRN: [id number]    Therapy Diagnosis:   Encounter Diagnoses   Name Primary?    Mild cognitive impairment     Cognitive communication deficit       Physician: Neel Jimenez MD  Physician Orders: [id number] - Ambulatory referral/consult to Speech Therapy  Medical Diagnosis from Referral: G31.84 (ICD-10-CM) - Mild cognitive impairment    Visit #/Visits authorized: 1/ 6  Date of Evaluation:  11/25/2020   Insurance Authorization Period: 11/25/2020-12/09/2020   Plan of Care Expiration:  11/25/2020 to 1/20/2021      Time In: 8:46  Time Out: 9:30  Test Interpretation Time: 22 minutes    Procedure Min.   Cognitive Communication Evaluation  66     Precautions:Standard  Subjective   Date of Onset: "a couple years"  History of Current Condition:   Patient reports that he has participated in ST at this location when he had swallowing difficulty a few years back but he is not currently having difficulty swallowing because he takes small bites and chews well. Patient reports that Dr. Jimenez sent him to ST because his memory was not as good as it used to be and not completing his sentences. Endorses difficulty communicating in faced paced environments. Difficulty with multi-tasking endorsed and reports that he often "gets of track." Patient reports that he does not wish to address swallowing because he is not having any difficulty swallowing at the current time.   Past Medical History: Diego Gunter  has a past medical history of Dysphagia, colonic polyps, Hyperlipidemia LDL goal <100, Overweight(278.02), Prostate cancer (september 2011), and Type II or unspecified type diabetes mellitus without mention of complication, not stated as uncontrolled.  Diego Gunter  has a past surgical history that includes Prostatectomy; Cataract extraction, bilateral (2014); and Bronchoscopy (N/A, " "10/15/2018).  Medical Hx and Allergies:  Diego has a current medication list which includes the following prescription(s): ascorbic acid (vitamin c), aspirin, b complex vitamins, fluad quad 2020-21(65y up)(pf), omega 3-dha-epa-fish oil, omega-3 fatty acids, omeprazole, and pravastatin. Review of patient's allergies indicates:  No Known Allergies  Imaging: CT scan 10/14/2019: "Impression:   No acute abnormality  Moderate generalized cerebral volume loss.   Findings consistent with chronic microvascular ischemic changes.  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 They have 2 sons. Patient is retired, he previously worked for a [employer] agency. Retired 7 years ago.   Prior Level of Function: Independent    Current Level of Function: Difficulty communicating and completing household tasks 2/2 reduced attention/memory   Pain:   0/10  Pain Location / Description: n/a  Nutrition:  Regular/thin but avoids hard candy or very crunchy things   Patient's Therapy Goals:  "To get my speech back"  Objective     Cognitive Linguistic Quick Test (CLQT) was administered to quickly assess the patient's overall cognitive-linguistic function and to determine cognitive strengths and weaknesses.           Cognitive Domain Score     Severity Rating      Attention    101 / 215   mild  Memory    119 / 185   mild  Executive Functions   8 / 40    moderate  Language    26 / 37    mild  Visuospatial Skills    38 / 105   mild  Clock Drawing    10 / 13    mild    Composite Severity Rating  2.8 / 4.0   mild    Task Score Ages 70-89 Cut Score Below?   Personal Facts  8  8 average   Symbol Cancellation 8  10 below average   Confrontational naming  10  10 average   Clock drawing  10  11 below average   Story Retelling 5  5 average   Symbol Trails 1  6 below average   Generative Naming 3  4 below average   Design Memory 3  4 below average "   Mazes 2  4 below average   Design Generation  2  5 below average       Cognition: Cognitive communication skills are considered mildly impaired. Patient answered orientation questions with 100% accuracy. Patient cancelled pre-determined symbol out of a field of many similar looking symbols with 66% accuracy, indicating impaired selective attention skills. Patient named common objects with 100% accuracy. Patient scored 10 (cut off score 12) on clock drawing task, indicating impaired  planning, organizing, self-monitoring, and self-correction skills. Patient recalled 6/18 (33% accuracy) details from a paragraph presented auditorily. Patient answered 6/6 y/n questions about the paragraph indicating adequate  comprehension but impaired auditory recall. Patient completed a symbol trails task (alternating size and shape) with 10% acc indicating impaired divided attention skills.  Patient completed divergent naming of concrete categories with 10 named items within one minute; completed divergent naming of abstract categories with 3 named items within one minute.  Patient recalled  3/6 designs in a design recall task indicating impaired visual recall skills. Patient completed a simple maze with  50% acc; completed a complex maze with 0% acc indicating impaired planning and organization for both simple and complex information. Patient created 2 designs with 4 lines, 2 designs with more than 4 lines, 1 designs with less than 4 lines, and 0 perseverative designs indicating impaired mental flexibility skills.    Auditory Comprehension: No concerns reported or observed; WFL for the purpose of assessment and conversation.    Verbal Expression: Patient named common objects with 100% accuracy. Patient with relatively fluency speech however word finding difficulty was observed at the conversational level.    Reading Comprehension: Did not assess. No concerns reported or observed.        Written Expression: Did not assess. No  concerns reported or observed.        Motor Speech/Fluency/Voice:  Pt presents with clear, 100% intelligible speech. His speech fluency is WNL. He presents with a clear voice with adequate volume and pitch. No concerns at this time. He reports that his wife and friends often comment that he talks too low and too slow however 100% speech intelligibility observed today. Will review motor speech strategies.     Swallowing: Not formally assessed as patient denies swallowing difficulty. Denies coughing/choking with food/liquids.    Hearing / Vision: No visual concerns. Patient endorses difficulty hearing and reports that he has seen an audiologist a few years ago.        Treatment   Treatment Time In: n/a  Treatment Time Out: n/a  Total Treatment Time: n/a  No treatment performed 2/2 time to administer evaluation    Education: Plan of Care, role of SLP in care, memory strategies, attention shifting strategies, course of medical disease affect on therapy diagnosis , scheduling an appointment with audiologist for hearing evaluation, scheduling/ cancellation policy, insurance limitations / visit limit  and word finding strateiges. Patient and family members expressed understanding. He verbalized understanding of all discussed.     Home Program: n/a  Assessment   Diego is a 80 y.o. male referred to outpatient SpeechTherapy with a medical diagnosis of mild cognitive impairment. Patient presents with mildly impaired cognitive-linguistic skills c/b moderately impaired executive functioning skills and mildly impaired attention, memory, language, and visuospatial skills.  Demonstrates impairments including limitations as described in the problem list. Positive prognostic factors include family support. Negative prognostic factors include none. No barriers to therapy identified.. Patient will benefit from skilled, outpatient neurological rehabilitation speech therapy.    Rehab Potential: fair-good  Patient's spiritual, cultural  and educational needs considered and patient agreeable to plan of care and goals.    Short Term Goals (4 weeks):   1. Patient will complete selective attention tasks (auditory or visual) with 90% acc indly increase selective attention.  2. Patient will use attention shifting strategies to shift attention between two tasks with no more than 3 cues or 90% acc to improve alternating attention.  3. Patient will complete divided attention tasks with 90% accuracy given min cues to improve attention skills  4. Patient will immediately recall details from information recently seen or heard using memory retrieval strategies with 90% accuracy given min cues to improve auditory and visual recall.   5. Patient will complete reasoning/problem solving tasks with 90% accuracy given min cues  6. Patient will complete simple planning/organization tasks with 90% accuracy given min cues to improve executive functioning skills.  7. Patient will complete mental manipulation tasks with 90% accuracy given min cues to improve working memory/mental flexibility.    8. Patient will complete word finding tasks with semantic relationships (I.e. Similarities and differences, synonyms, antonyms, semantic category) with 90% acc independently to improve word finding.     Long Term Goals (8 weeks):   1. Pt will improve  attention skills to effectively attend to and communicate in simple daily living tasks in functional living environment.   2. He  will develop functional and intelligible speech and utilize compensatory strategies through the use of adequate labial and lingual function, increased articulatory precision and speech prosody.  3. Pt will demonstrate use of self awareness,  goal setting, planning and  initiation during daily living activities to improve safety and awareness in functional living environment.  4. He  will demonstrate improved problem solving skills and provide appropriate solutions to problems in order to improve safety and  awareness in functional living environment.           Plan   Plan of Care Certification: 11/25/2020  to 1/20/2021  Recommended Treatment Plan:  Patient will participate in the [facility] neurological rehabilitation program for speech therapy 2 times per week to address his  Communication and Cognition deficits, to educate patient and their family, and to participate in a home exercise program.    Other Recommendations: Audiologist for hearing evaluation     Therapist's Name:   BRAEDEN Lowery, CCC-SLP  Speech Language Pathologist   11/25/2020

## 2020-12-04 ENCOUNTER — CLINICAL SUPPORT (OUTPATIENT)
Dept: REHABILITATION | Facility: HOSPITAL | Age: 80
End: 2020-12-04
Attending: NEUROLOGICAL SURGERY
Payer: MEDICARE

## 2020-12-04 DIAGNOSIS — R41.841 COGNITIVE COMMUNICATION DEFICIT: Primary | ICD-10-CM

## 2020-12-04 NOTE — PROGRESS NOTES
Outpatient Neurological Rehabilitation   Speech and Language Therapy Treatment Note  Date:  12/4/2020     Name: Diego Gunter   MRN: [id number]   Therapy Diagnosis:   Encounter Diagnosis   Name Primary?    Cognitive communication deficit Yes   Physician: Neel Jimenez MD  Physician Orders: [id number] - Ambulatory referral/consult to Speech Therapy  Medical Diagnosis from Referral: G31.84 (ICD-10-CM) - Mild cognitive impairment    Visit #/ Visits Authorized: 2/ 6  Date of Evaluation:  11/25/2020   Insurance Authorization Period: 11/25/2020-12/09/2020   Plan of Care Expiration: 01/20/2021    Extended POC:  n/a  Progress Note: 12/25/2020    Visits Cancelled: 0  Visits No Show: 0    Time In: 08:20  Time Out:  09:05  Total Billable Time: 45 mins    Precautions: Standard  Subjective:   Pt reports: Dr Ny told me to read books, so I've been reading. (lost track of conversation requiring cues). I got a luncheon every Wednesday and I'll get up every Wednesday to go eat and I'm trying to tell them what I've been up to and I can't tell them.  He was not compliant to home exercise program as it has not yet been established.   Response to previous treatment: Positive  Pain Scale:  0/10 on VAS currently.   Pain Location: n/a  Objective:   TIMED  Procedure Min.   Cognitive Therapeutic Interventions, first 15 minutes CPT 82503 15   Cognitive Therapeutic Interventions, each additional 15 minutes CPT 65375 30         Total Timed Units: 3  Total Untimed Units: n/a  Charges Billed/# of units: 3    Short Term Goals: (4 weeks) Current Progress:   1. Patient will complete selective attention tasks (auditory or visual) with 90% acc indly increase selective attention.    Progressing/ Not Met 12/4/2020   Constant therapy: (symbol finding level 6): 70% acc ind'ly; 90% acc given min cues for attention to detail   2. Patient will use attention shifting strategies to shift attention between two tasks with no more than 3 cues or 90% acc to  improve alternating attention.    Progressing/ Not Met 12/4/2020   Not formally addressed. Slight difficulty switching between mildly complex tasks.   3. Patient will complete divided attention tasks with 90% accuracy given min cues to improve attention skills    Progressing/ Not Met 12/4/2020   30% acc ind'ly; 70% acc given mod cues   4. Patient will immediately recall details from information recently seen or heard using memory retrieval strategies with 90% accuracy given min cues to improve auditory and visual recall.     Progressing/ Not Met 12/4/2020   WRAP (write it down, repeat, association, and picture it) introduced this date.  60% acc ind'ly; 80% acc given cues for utilization of strategies and repetition of information   5. Patient will complete reasoning/problem solving tasks with 90% accuracy given min cues.    Progressing/ Not Met 12/4/2020   Not formally addressed.   6. Patient will complete simple planning/organization tasks with 90% accuracy given min cues to improve executive functioning skills.  Progressing/ Not Met 12/4/2020   Not formally addressed.   7. Patient will complete mental manipulation tasks with 90% accuracy given min cues to improve working memory/mental flexibility.      Progressing/ Not Met 12/4/2020   3 word reversal: 50% acc ind'ly; 75% acc given repetition of initial stimuli x2   8. Patient will complete word finding tasks with semantic relationships (I.e. Similarities and differences, synonyms, antonyms, semantic category) with 90% acc independently to improve word finding.     Progressing/ Not Met 12/04/2020 Synonyms: 80% acc ind'ly; 90% acc given min cue  Antonyms: 90% acc ind'ly; 100% acc given min cue  Convergent naming task given 3 clues: 70% acc ind'ly; 80% acc given mod cues     Patient Education/Response:   Patient education provided re: memory strategies in form of WRAP and provided with handout. Discussed strategies for memory during tasks such as reading in order to  promote carryover. Patient verbalized understanding, will likely need continued education.    Written Home Exercises Provided: Yes; semantic category word finding handout.  Exercises were reviewed and Diego was able to demonstrate them prior to the end of the session.  Diego demonstrated fair  understanding of the education provided.     Assessment:   Diego is progressing well towards his goals. Patient displayed strength in asking for repetition of stimuli when not understood. He displayed difficulty with mental flexibility benefiting from cueing and repetition. Patient's hearing appears to negatively impact performance with better side being his RIGHT side. Patient aware of need to see Audiologist re: this. Good performance with checking work prior to submission after errors in previous submission. Improved performance with cues for taking time to process prior to responding. Current goals remain appropriate. Goals to be updated as necessary.     Pt prognosis is Fair-good. Pt will continue to benefit from skilled outpatient speech and language therapy to address the deficits listed in the problem list on initial evaluation, provide pt/family education and to maximize pt's level of independence in the home and community environment.   Medical necessity is demonstrated by the following IMPAIRMENTS:  Communication and cognitive deficits  Barriers to Therapy: None identified.   Pt's spiritual, cultural and educational needs considered and pt agreeable to plan of care and goals.  Plan:   Continue POC with focus on communication and cognitive deficits.    Tisha Boothe, CCC-SLP   12/4/2020           Tisha Boothe MS, CCC-SLP  Speech Language Pathologist   12/4/2020

## 2020-12-07 ENCOUNTER — CLINICAL SUPPORT (OUTPATIENT)
Dept: REHABILITATION | Facility: HOSPITAL | Age: 80
End: 2020-12-07
Attending: NEUROLOGICAL SURGERY
Payer: MEDICARE

## 2020-12-07 DIAGNOSIS — R41.841 COGNITIVE COMMUNICATION DEFICIT: Primary | ICD-10-CM

## 2020-12-07 NOTE — PROGRESS NOTES
Outpatient Neurological Rehabilitation   Speech and Language Therapy Treatment Note  Date:  12/7/2020     Name: Diego Gunter   MRN: [id number]   Therapy Diagnosis:   Encounter Diagnosis   Name Primary?    Cognitive communication deficit Yes   Physician: Neel Jimenez MD  Physician Orders: [id number] - Ambulatory referral/consult to Speech Therapy  Medical Diagnosis from Referral: G31.84 (ICD-10-CM) - Mild cognitive impairment    Visit #/ Visits Authorized: 3/ 6  Date of Evaluation:  11/25/2020   Insurance Authorization Period: 11/25/2020-12/09/2020   Plan of Care Expiration: 01/20/2021    Extended POC:  n/a  Progress Note: 12/25/2020    Visits Cancelled: 0  Visits No Show: 0    Time In: 08:45  Time Out:  09:30  Total Billable Time: 45 mins    Precautions: Standard  Subjective:   Pt reports: This weekend was good, the [place] won. I went to my brother's house and my grandson and his girlfriend were there and hung out, watched the game. Trying to think what I did Saturday, oh I did my homework and I really enjoyed that task.  He was compliant to home exercise program.  Response to previous treatment: Positive  Pain Scale:  0/10 on VAS currently.   Pain Location: n/a  Objective:   TIMED  Procedure Min.   Cognitive Therapeutic Interventions, first 15 minutes CPT 55841 15   Cognitive Therapeutic Interventions, each additional 15 minutes CPT 36901 30         Total Timed Units: 3  Total Untimed Units: n/a  Charges Billed/# of units: 3    Short Term Goals: (4 weeks) Current Progress:   1. Patient will complete selective attention tasks (auditory or visual) with 90% acc indly increase selective attention.    Progressing/ Not Met 12/7/2020   Symbol location on paper: 90% acc ind'ly; 100% acc given min cue    Met x1   2. Patient will use attention shifting strategies to shift attention between two tasks with no more than 3 cues or 90% acc to improve alternating attention.    Progressing/ Not Met 12/7/2020   .Constant  therapy: LEVEL 3 alternating words-66% ind'ly; 100% acc given mod cues   3. Patient will complete divided attention tasks with 90% accuracy given min cues to improve attention skills    Progressing/ Not Met 2020   Not formally addressed   4. Patient will immediately recall details from information recently seen or heard using memory retrieval strategies with 90% accuracy given min cues to improve auditory and visual recall.     Progressing/ Not Met 2020   WRAP (write it down, repeat, association, and picture it)   70% acc ind'ly; 90% acc given cues for utilization of strategies and repetition of information upon pt request.    5. Patient will complete reasoning/problem solving tasks with 90% accuracy given min cues.    Progressing/ Not Met 2020   Moderately complex reasonin% acc ind'ly; 70% acc given mod cues; 100% acc given MAX cues   6. Patient will complete simple planning/organization tasks with 90% accuracy given min cues to improve executive functioning skills.  Progressing/ Not Met 2020   Planning for daily task of going to mall: 80% acc ind'ly; 100% acc with min cues (deciding time, location, items to retrieve)   7. Patient will complete mental manipulation tasks with 90% accuracy given min cues to improve working memory/mental flexibility.      Progressing/ Not Met 2020   3 items by rankin% acc ind'ly; 70% acc given repetition of initial stimuli per patient request   8. Patient will complete word finding tasks with semantic relationships (I.e. Similarities and differences, synonyms, antonyms, semantic category) with 90% acc independently to improve word finding.     Progressing/ Not Met 2020 Not formally addressed.     Patient Education/Response:   Discussed writing down daily events for improved recall and ability to discuss/relay information to others. Patient verbalized understanding.    Written Home Exercises Provided: Yes; word finding task..  Exercises were  reviewed and Diego was able to demonstrate them prior to the end of the session.  Diego demonstrated good  understanding of the education provided.     Assessment:   Diego is progressing well towards his goals. Utilizing association as memory strategy has been beneficial for patient recall of information.  Improved ability to attend to task requiring decreased cueing for understanding of task. Patient initiated more this session, likely due to increased rapport with SLP. Mental flexibility continues to require moderate cueing and frequent repetition of information. Good planning for daily tasks present with min cueing.Current goals remain appropriate. Goals to be updated as necessary.     Pt prognosis is Fair-good. Pt will continue to benefit from skilled outpatient speech and language therapy to address the deficits listed in the problem list on initial evaluation, provide pt/family education and to maximize pt's level of independence in the home and community environment.   Medical necessity is demonstrated by the following IMPAIRMENTS:  Communication and cognitive deficits  Barriers to Therapy: None identified.   Pt's spiritual, cultural and educational needs considered and pt agreeable to plan of care and goals.  Plan:   Continue POC with focus on communication and cognitive deficits.    Tisha Boothe CCC-SLP   Speech-Language Pathologist  12/7/2020

## 2020-12-09 ENCOUNTER — CLINICAL SUPPORT (OUTPATIENT)
Dept: REHABILITATION | Facility: HOSPITAL | Age: 80
End: 2020-12-09
Attending: NEUROLOGICAL SURGERY
Payer: MEDICARE

## 2020-12-09 DIAGNOSIS — R41.841 COGNITIVE COMMUNICATION DEFICIT: Primary | ICD-10-CM

## 2020-12-09 NOTE — PROGRESS NOTES
Outpatient Neurological Rehabilitation   Speech and Language Therapy Treatment Note  Date:  12/9/2020     Name: Diego Gunter   MRN: [id number]   Therapy Diagnosis:   Encounter Diagnosis   Name Primary?    Cognitive communication deficit Yes   Physician: Neel Jimenez MD  Physician Orders: [id number] - Ambulatory referral/consult to Speech Therapy  Medical Diagnosis from Referral: G31.84 (ICD-10-CM) - Mild cognitive impairment    Visit #/ Visits Authorized: 4 / 6  Date of Evaluation:  11/25/2020   Insurance Authorization Period: 11/25/2020-12/09/2020   Plan of Care Expiration: 01/20/2021    Extended POC:  n/a  Progress Note: 12/25/2020    Visits Cancelled: 0  Visits No Show: 0    Time In: 08:44  Time Out:  09:30  Total Billable Time: 46 mins    Precautions: Standard  Subjective:   Pt reports: I got my new shoes, they're bright white so I guess I'll wait until Easter. [facility] in [place], it was good.  He was compliant to home exercise program.  Response to previous treatment: Positive  Pain Scale:  0/10 on VAS currently.   Pain Location: n/a  Objective:   TIMED  Procedure Min.   Cognitive Therapeutic Interventions, first 15 minutes CPT 31138 15   Cognitive Therapeutic Interventions, each additional 15 minutes CPT 96335 31         Total Timed Units: 3  Total Untimed Units: n/a  Charges Billed/# of units: 3    Short Term Goals: (4 weeks) Current Progress:   1. Patient will complete selective attention tasks (auditory or visual) with 90% acc indly increase selective attention.    Progressing/ Not Met 12/9/2020   Not formally addressed      Met x1   2. Patient will use attention shifting strategies to shift attention between two tasks with no more than 3 cues or 90% acc to improve alternating attention.    Progressing/ Not Met 12/9/2020   Required cues x4 this date for alternating attention between two tasks.   3. Patient will complete divided attention tasks with 90% accuracy given min cues to  improve attention skills    Progressing/ Not Met 12/9/2020   60% acc ind'ly; 90% acc given min-mod verbal cues for reminders of components of task (3 items to recall/operate within during task)   4. Patient will immediately recall details from information recently seen or heard using memory retrieval strategies with 90% accuracy given min cues to improve auditory and visual recall.     Progressing/ Not Met 12/9/2020   WRAP (write it down, repeat, association, and picture it)   2-3 sentence paragraphs with functional information: 50% acc ind'ly; 70% acc given cues for utilization of strategies and repetition of information upon pt request   5. Patient will complete reasoning/problem solving tasks with 90% accuracy given min cues.    Progressing/ Not Met 12/9/2020   Abstract reasoning item similarities/differences: 80% acc ind'ly; 100% acc given min cues   6. Patient will complete simple planning/organization tasks with 90% accuracy given min cues to improve executive functioning skills.  Progressing/ Not Met 12/9/2020   Not formally addressed.   7. Patient will complete mental manipulation tasks with 90% accuracy given min cues to improve working memory/mental flexibility.      Progressing/ Not Met 12/9/2020   3 item word reversal: 60% acc ind'ly; 80% acc given repetition of initial stimuli per patient request   8. Patient will complete word finding tasks with semantic relationships (I.e. Similarities and differences, synonyms, antonyms, semantic category) with 90% acc independently to improve word finding.     Progressing/ Not Met 12/09/2020 Adding to concrete category: 70% acc ind'ly; 90% acc given min cues  Synonyms: 70% acc ind'ly; 90% acc min cues  Antonyms: 90% acc ind'ly; 100% given min cues     Patient Education/Response:   Discussed writing down daily events for improved recall and ability to discuss/relay information to others. Patient verbalized understanding.    Written Home Exercises Provided: Yes;  variety of word finding, deductive reasoning, functional problem solving, and memory tasks as patient does not return to therapy until 12/21/2020  Exercises were reviewed and Diego was able to demonstrate them prior to the end of the session.  Diego demonstrated good  understanding of the education provided.     Assessment:   Diego is progressing well towards his goals. Improvement in ability to ask for repetition of initial stimuli in order to increase accuracy. Patient performance increased with mental flexibility task this date. Continues to display decreased processing of information at times which possibly is due to decreased hearing. Continues to require cues for utilization of memory strategies during task though improvement in concentration during tasks present. Increased initiation and ability to participate in unstructured conversations/recall of life events that can be discussed during conversation positively impacting patient interactions. Current goals remain appropriate. Goals to be updated as necessary.     Pt prognosis is Fair-good. Pt will continue to benefit from skilled outpatient speech and language therapy to address the deficits listed in the problem list on initial evaluation, provide pt/family education and to maximize pt's level of independence in the home and community environment.   Medical necessity is demonstrated by the following IMPAIRMENTS:  Communication and cognitive deficits  Barriers to Therapy: None identified.   Pt's spiritual, cultural and educational needs considered and pt agreeable to plan of care and goals.  Plan:   Continue POC with focus on communication and cognitive deficits.    Tisha Boothe, CCC-SLP   Speech-Language Pathologist  12/9/2020

## 2020-12-18 NOTE — PROGRESS NOTES
"Outpatient Neurological Rehabilitation   Speech and Language Therapy Treatment Note  Date:  12/21/2020     Name: Diego Gunter   MRN: [id number]   Therapy Diagnosis:   Encounter Diagnosis   Name Primary?    Cognitive communication deficit Yes   Physician: Neel Jimenez MD  Physician Orders: [id number] - Ambulatory referral/consult to Speech Therapy  Medical Diagnosis from Referral: G31.84 (ICD-10-CM) - Mild cognitive impairment    Visit #/ Visits Authorized: 1/pending (plus 4 from previous auth)  Date of Evaluation:  11/25/2020   Insurance Authorization Period: 11/25/2020-11/25/2021  Plan of Care Expiration: 01/20/2021    Extended POC:  n/a  Progress Note: 12/25/2020    Visits Cancelled: 0  Visits No Show: 0    Time In: 8:46   Time Out:  9:31  Total Billable Time: 45 mins    Precautions: Standard  Subjective:   Pt reports: that he's been doing okay. No changes since last session. Reports that he is "up and down" with completing tasks at home. Reports that he doesn't need to write things down/lists because his wife does this for him. Reports he forgot his glasses today.   He was compliant to home exercise program.  Response to previous treatment: Positive  Pain Scale:  0/10 on VAS currently.   Pain Location: n/a  Objective:   TIMED  Procedure Min.   Cognitive Therapeutic Interventions, first 15 minutes CPT 30176 15   Cognitive Therapeutic Interventions, each additional 15 minutes CPT 97872 30          Total Timed Units: 3  Total Untimed Units: n/a  Charges Billed/# of units: 3    Short Term Goals: (4 weeks) Current Progress:   1. Patient will complete selective attention tasks (auditory or visual) with 90% acc indly increase selective attention.     100% acc independently completing visual selective attention task     Met x2 Goal Met / Discontinue      2. Patient will use attention shifting strategies to shift attention between two tasks with no more than 3 cues or 90% acc to improve alternating " attention.    Progressing/ Not Met 12/21/2020   Required cues x3 this date for alternating attention between two tasks (task was approximately 3-4 minutes of sustained attention). METx1   3. Patient will complete divided attention tasks with 90% accuracy given min cues to improve attention skills    Progressing/ Not Met 12/21/2020   Not formally addressed     4. Patient will immediately recall details from information recently seen or heard using memory retrieval strategies with 90% accuracy given min cues to improve auditory and visual recall.     Progressing/ Not Met 12/21/2020   WRAP (write it down, repeat, association, and picture it) recalled with 25% acc independently; reviewed remaining strategies     2-3 sentence paragraphs with functional information: 55% acc ind'ly; 89% acc given cues for utilization of strategies and repetition of information upon pt request   5. Patient will complete reasoning/problem solving tasks with 90% accuracy given min cues.    Progressing/ Not Met 12/21/2020   Not formally addressed     6. Patient will complete simple planning/organization tasks with 90% accuracy given min cues to improve executive functioning skills.  Progressing/ Not Met 12/21/2020   Patient verbally organized various items into their respective categories (6 category options) with 88% accuracy independently; 100% acc given min cues    7. Patient will complete mental manipulation tasks with 90% accuracy given min cues to improve working memory/mental flexibility.      Progressing/ Not Met 12/21/2020   3 item word reversal: 80% acc ind'ly; 100% acc given repetition of initial stimuli per patient request    3 item word opposites: 60% acc ind'ly; 80% acc given repetition of initial stimuli per patient request   8. Patient will complete word finding tasks with semantic relationships (I.e. Similarities and differences, synonyms, antonyms, semantic category) with 90% acc independently to improve word finding.      Progressing/ Not Met 12/18/2020 generating opposites: 100% acc independently      Patient Education/Response:   Discussed writing down daily events for improved recall and ability to discuss/relay information to others and how physically writing items down is beneficial for recall as opposed to patient's wife writing down lists. Extensively discussed memory strategies. Patient verbalized understanding.     Written Home Exercises Provided: Yes; problem solving/scheduling task  Exercises were reviewed and Diego was able to demonstrate them prior to the end of the session.  Diego demonstrated good  understanding of the education provided.     Assessment:   Diego is progressing well towards his goals. Improvement in ability to sustain attention and alternate attention this date with decreased levels of cueing required.  Slightly increased accuracy with immediate memory tasks but poor independent performance continued. Patient performance increased with familiar mental flexibility task; difficulty with generating word opposites within working memory task.  Current goals remain appropriate. Goals to be updated as necessary.     Pt prognosis is Fair-good. Pt will continue to benefit from skilled outpatient speech and language therapy to address the deficits listed in the problem list on initial evaluation, provide pt/family education and to maximize pt's level of independence in the home and community environment.   Medical necessity is demonstrated by the following IMPAIRMENTS:  Communication and cognitive deficits  Barriers to Therapy: None identified.   Pt's spiritual, cultural and educational needs considered and pt agreeable to plan of care and goals.  Plan:   Continue POC with focus on improving communication and cognitive deficits.    Giselle Vance, THERESE-SLP   Speech-Language Pathologist  12/21/2020

## 2020-12-21 ENCOUNTER — CLINICAL SUPPORT (OUTPATIENT)
Dept: REHABILITATION | Facility: HOSPITAL | Age: 80
End: 2020-12-21
Attending: NEUROLOGICAL SURGERY
Payer: MEDICARE

## 2020-12-21 DIAGNOSIS — R41.841 COGNITIVE COMMUNICATION DEFICIT: Primary | ICD-10-CM

## 2020-12-21 PROCEDURE — 97130 THER IVNTJ EA ADDL 15 MIN: CPT | Mod: PN | Performed by: SPEECH-LANGUAGE PATHOLOGIST

## 2020-12-21 PROCEDURE — 97129 THER IVNTJ 1ST 15 MIN: CPT | Mod: PN | Performed by: SPEECH-LANGUAGE PATHOLOGIST

## 2020-12-21 NOTE — PROGRESS NOTES
Outpatient Neurological Rehabilitation   Speech and Language Therapy Treatment Note  Date:  12/23/2020     Name: Diego Gunter   MRN: [id number]   Therapy Diagnosis:   Encounter Diagnosis   Name Primary?    Cognitive communication deficit Yes   Physician: Neel Jimenez MD  Physician Orders: [id number] - Ambulatory referral/consult to Speech Therapy; cont of therapy  Medical Diagnosis from Referral: G31.84 (ICD-10-CM) - Mild cognitive impairment    Visit #/ Visits Authorized: 1/pending (plus 6 from previous auth)  Date of Evaluation:  11/25/2020   Insurance Authorization Period: 11/25/2020-11/25/2021  Plan of Care Expiration: 01/20/2021    Extended POC:  n/a  Progress Note: 12/25/2020    Visits Cancelled: 0  Visits No Show: 0    Time In: 8:40 am  Time Out:  9:23 am  Total Billable Time: 43 mins    Precautions: Standard  Subjective:   Pt reports: that its going to get cold tomorrow.    He was compliant to home exercise program; completed scheduling task with 90% accuracy. Reviewed mistakes   Response to previous treatment: Positive  Pain Scale:  0/10 on VAS currently.   Pain Location: n/a  Objective:   TIMED  Procedure Min.   Cognitive Therapeutic Interventions, first 15 minutes CPT 65672 15   Cognitive Therapeutic Interventions, each additional 15 minutes CPT 57828 27         Total Timed Units: 3  Total Untimed Units: n/a  Charges Billed/# of units: 3    Short Term Goals: (4 weeks) Current Progress:   1. Patient will complete selective attention tasks (auditory or visual) with 90% acc indly increase selective attention.     Goal Met / Discontinue      2. Patient will use attention shifting strategies to shift attention between two tasks with no more than 3 cues or 90% acc to improve alternating attention.     Required cues x2 this date for alternating attention between two written tasks (alternating between calendar task and word deduction task every 30-45 seconds when timer sounded)     METx2 Goal Met /  Discontinue    3. Patient will complete divided attention tasks with 90% accuracy given min cues to improve attention skills    Progressing/ Not Met 12/23/2020   Not formally addressed     4. Patient will immediately recall details from information recently seen or heard using memory retrieval strategies with 90% accuracy given min cues to improve auditory and visual recall.     Progressing/ Not Met 12/23/2020   WRAP (write it down, repeat, association, and picture it) recalled with 50% acc independently; reviewed remaining strategies     Constant therapy remember the right card level 2: 82% acc independently    5. Patient will complete reasoning/problem solving tasks with 90% accuracy given min cues.    Progressing/ Not Met 12/23/2020   Not formally addressed     6. Patient will complete simple planning/organization tasks with 90% accuracy given min cues to improve executive functioning skills.  Progressing/ Not Met 12/23/2020   Patient completed a moderate calendar organization task with 70% accuracy independently; 100% acc given mod cues    7. Patient will complete mental manipulation tasks with 90% accuracy given min cues to improve working memory/mental flexibility.      Progressing/ Not Met 12/23/2020   Not formally addressed     8. Patient will complete word finding tasks with semantic relationships (I.e. Similarities and differences, synonyms, antonyms, semantic category) with 90% acc independently to improve word finding.     Progressing/ Not Met 12/21/2020 Word deduction: 90% acc independently; 100% acc given min cues  METx1     Patient Education/Response:   Discussed functional use of memory strategies. Patient verbalized understanding.     Written Home Exercises Provided: Yes; problem solving/scheduling task  Exercises were reviewed and Diego was able to demonstrate them prior to the end of the session.  Diego demonstrated good  understanding of the education provided.     Assessment:   Diego is  progressing well towards his goals. Improvement in alternating attention this date with decreased levels of cueing required (2 cues compared to 3 cues last session), meeting short term goal 2. Continued good performance with word finding tasks. Difficulty with calendar organization task but ws responsive to cues.  Current goals remain appropriate. Goals to be updated as necessary.     Pt prognosis is Fair-good. Pt will continue to benefit from skilled outpatient speech and language therapy to address the deficits listed in the problem list on initial evaluation, provide pt/family education and to maximize pt's level of independence in the home and community environment.   Medical necessity is demonstrated by the following IMPAIRMENTS:  Communication and cognitive deficits  Barriers to Therapy: None identified.   Pt's spiritual, cultural and educational needs considered and pt agreeable to plan of care and goals.  Plan:   Continue POC with focus on improving communication and cognitive deficits.    BRAEDEN Lowery, CCC-SLP  Speech Language Pathologist   12/23/2020

## 2020-12-23 ENCOUNTER — CLINICAL SUPPORT (OUTPATIENT)
Dept: REHABILITATION | Facility: HOSPITAL | Age: 80
End: 2020-12-23
Attending: NEUROLOGICAL SURGERY
Payer: MEDICARE

## 2020-12-23 DIAGNOSIS — R41.841 COGNITIVE COMMUNICATION DEFICIT: Primary | ICD-10-CM

## 2020-12-23 PROCEDURE — 97130 THER IVNTJ EA ADDL 15 MIN: CPT | Mod: PN | Performed by: SPEECH-LANGUAGE PATHOLOGIST

## 2020-12-23 PROCEDURE — 97129 THER IVNTJ 1ST 15 MIN: CPT | Mod: PN | Performed by: SPEECH-LANGUAGE PATHOLOGIST

## 2020-12-28 ENCOUNTER — CLINICAL SUPPORT (OUTPATIENT)
Dept: REHABILITATION | Facility: HOSPITAL | Age: 80
End: 2020-12-28
Attending: NEUROLOGICAL SURGERY
Payer: MEDICARE

## 2020-12-28 DIAGNOSIS — R41.841 COGNITIVE COMMUNICATION DEFICIT: Primary | ICD-10-CM

## 2020-12-28 PROCEDURE — 97129 THER IVNTJ 1ST 15 MIN: CPT | Mod: PN | Performed by: SPEECH-LANGUAGE PATHOLOGIST

## 2020-12-28 PROCEDURE — 97130 THER IVNTJ EA ADDL 15 MIN: CPT | Mod: PN | Performed by: SPEECH-LANGUAGE PATHOLOGIST

## 2020-12-28 NOTE — PROGRESS NOTES
Outpatient Neurological Rehabilitation   Speech and Language Therapy PROGRESS Note  Date:  12/28/2020     Name: Diego Gunter   MRN: [id number]   Therapy Diagnosis:   Encounter Diagnosis   Name Primary?    Cognitive communication deficit Yes   Physician: Neel Jimenez MD  Physician Orders: [id number] - Ambulatory referral/consult to Speech Therapy; cont of therapy  Medical Diagnosis from Referral: G31.84 (ICD-10-CM) - Mild cognitive impairment    Visit #/ Visits Authorized: 2/pending (plus 6 from previous auth)  Date of Evaluation:  11/25/2020   Insurance Authorization Period: 11/25/2020-11/25/2021  Plan of Care Expiration: 01/20/2021    Extended POC:  n/a  Progress Note: 1/28/21   Visits Cancelled: 0  Visits No Show: 0    Time In: 7:50  am  Time Out: 7:34 am  Total Billable Time: 44 mins    Precautions: Standard  Subjective:   Pt reports: that he had a good time for [place] with his family but is not sure if he will get together with his family for New Years yet. Reports he is still experiencing word finding difficulty especially in high pressure situations (I.e., conversations with >2-3 people).   He was compliant to home exercise program   Response to previous treatment: Positive  Pain Scale:  0/10 on VAS currently.   Pain Location: n/a  Objective:   TIMED  Procedure Min.   Cognitive Therapeutic Interventions, first 15 minutes CPT 15298 15   Cognitive Therapeutic Interventions, each additional 15 minutes CPT 80705 29          Total Timed Units: 3  Total Untimed Units: n/a  Charges Billed/# of units: 3    Short Term Goals: (4 weeks) Current Progress:   1. Patient will complete selective attention tasks (auditory or visual) with 90% acc indly increase selective attention.     Goal Met / Discontinue      2. Patient will use attention shifting strategies to shift attention between two tasks with no more than 3 cues or 90% acc to improve alternating attention.     METx2 Goal Met / Discontinue    3. Patient will  complete divided attention tasks with 90% accuracy given min cues to improve attention skills    Progressing/ Not Met 12/28/2020   Auditory and visual combination: Constant therapy symbol matching level 6: 77% acc while simultaneously answering 3 unit yes/no questions with 80% acc independently; 100% acc given repetition of stimuli   4. Patient will immediately recall details from information recently seen or heard using memory retrieval strategies with 90% accuracy given min cues to improve auditory and visual recall.     Progressing/ Not Met 12/28/2020   WRAP (write it down, repeat, association, and picture it) recalled with 0% acc independently; reviewed remaining strategies     Constant therapy follow instructions you hear level 1: 20% acc independently; 60% acc despite multiple repetitions of stimuli and max cues   5. Patient will complete reasoning/problem solving tasks with 90% accuracy given min cues.    Progressing/ Not Met 12/28/2020   Patient identified word that was not related in a field of 5 semantically related words with 90% acc independently. Able to verbalize why item was different than remaining items with 100% acc independently    6. Patient will complete simple planning/organization tasks with 90% accuracy given min cues to improve executive functioning skills.  Progressing/ Not Met 12/28/2020   Not formally addressed     7. Patient will complete mental manipulation tasks with 90% accuracy given min cues to improve working memory/mental flexibility.      Progressing/ Not Met 12/28/2020   Not formally addressed     8. Patient will complete word finding tasks with semantic relationships (I.e. Similarities and differences, synonyms, antonyms, semantic category) with 90% acc independently to improve word finding.      Patient identified word that was not related in a field of 5 semantically related words with 90% acc independently. Able to verbalize why item was different than remaining items with  100% acc independently   METx2 Goal Met / Discontinue      NEW GOAL 12/28/2020:  9. Patient will complete semantic feature analysis (SFA) chart when given an object/verb with 90% acc given min cues to improve word finding abilities  NEW GOAL          Patient Education/Response:   Discussed progress and importance of using learned strategies in home environment. Discussed semantic feature analysis strategy at length. Patient verbalized understanding.     Written Home Exercises Provided: Yes; modifying sentence incongruences to complete while completing an auditory task  Exercises were reviewed and Diego was able to demonstrate them prior to the end of the session.  Diego demonstrated good  understanding of the education provided.     Assessment:   Diego is progressing well towards his goals. Some difficulty with divided attention this date with however task performance improved as task continued. Patient continues with good performance with word finding tasks and demonstrates little to no word finding difficulty at conversational level, meeting short term goal 8 however added goal to train use of semantic feature analysis strategy. Good reasoning skills demonstrated today. Some carryover of learned strategies noted despite difficulty recalling memory strategies. Direct cues continue to be occasionally required to utilize both internal and external memory strategies. Though not formally addressed this session, improvement in organization/planning skills noted within highly structured tasks. Difficulty noted with functional planning (I.e. scheduling appointments). Overall, patient continues with good progress towards established goals and continues to benefit from ongoing skilled ST services. Patient has met 3/8 short term goals throughout current plan of care with one short term goal added today.  Current goals remain appropriate. Goals to be updated as necessary.     Pt prognosis is Fair-good. Pt will continue to  benefit from skilled outpatient speech and language therapy to address the deficits listed in the problem list on initial evaluation, provide pt/family education and to maximize pt's level of independence in the home and community environment.   Medical necessity is demonstrated by the following IMPAIRMENTS:  Communication and cognitive deficits  Barriers to Therapy: None identified.   Pt's spiritual, cultural and educational needs considered and pt agreeable to plan of care and goals.  Plan:   Continue POC with focus on improving communication and cognitive deficits.    BRAEDEN Lowery, CCC-SLP  Speech Language Pathologist   12/28/2020

## 2020-12-28 NOTE — PROGRESS NOTES
Outpatient Neurological Rehabilitation   Speech and Language Therapy Treatment Note  Date:  12/30/2020     Name: Diego Gunter   MRN: [id number]   Therapy Diagnosis:   Encounter Diagnosis   Name Primary?    Cognitive communication deficit Yes   Physician: Neel Jimenez MD  Physician Orders: [id number] - Ambulatory referral/consult to Speech Therapy; cont of therapy  Medical Diagnosis from Referral: G31.84 (ICD-10-CM) - Mild cognitive impairment    Visit #/ Visits Authorized: 3/pending (plus 6 from previous auth)  Date of Evaluation:  11/25/2020   Insurance Authorization Period: 11/25/2020-11/25/2021  Plan of Care Expiration: 01/20/2021    Extended POC:  n/a  Progress Note: 1/28/21   Visits Cancelled: 0  Visits No Show: 0    Time In: 8:52 am  Time Out: 9:37 am  Total Billable Time: 45 mins    Precautions: Standard  Subjective:   Pt reports: That he is doing fine today. No complaints/concerns stated. Requesting to schedule additional appointments.    He was compliant to home exercise program   Response to previous treatment: Positive  Pain Scale:  0/10 on VAS currently.   Pain Location: n/a  Objective:   TIMED  Procedure Min.   Cognitive Therapeutic Interventions, first 15 minutes CPT 14210 15   Cognitive Therapeutic Interventions, each additional 15 minutes CPT 25671 30          Total Timed Units: 3  Total Untimed Units: n/a  Charges Billed/# of units: 3    Short Term Goals: (4 weeks) Current Progress:   1. Patient will complete selective attention tasks (auditory or visual) with 90% acc indly increase selective attention.     Goal Met / Discontinue      2. Patient will use attention shifting strategies to shift attention between two tasks with no more than 3 cues or 90% acc to improve alternating attention.     METx2 Goal Met / Discontinue    3. Patient will complete divided attention tasks with 90% accuracy given min cues to improve attention skills    Progressing/ Not Met 12/30/2020   Auditory and visual  combination: Constant therapy symbol matching level 6: 85% acc (previous session 77%) while simultaneously answering 3 unit yes/no questions with 98% acc (previous session 80%) independently   4. Patient will immediately recall details from information recently seen or heard using memory retrieval strategies with 90% accuracy given min cues to improve auditory and visual recall.     Progressing/ Not Met 12/30/2020   WRAP (write it down, repeat, association, and picture it) recalled with 0% acc independently; reviewed remaining strategies     Following 2 step, before/after + adjective commands: 46% acc independently; 80% acc given single repetition; 100% acc given multiple repetitions   5. Patient will complete reasoning/problem solving tasks with 90% accuracy given min cues.    Progressing/ Not Met 12/30/2020   Not formally addressed     6. Patient will complete simple planning/organization tasks with 90% accuracy given min cues to improve executive functioning skills.  Progressing/ Not Met 12/30/2020   Patient completed moderate calendar organization tasks (organizing various memos/reminders/bills) into calendar format with 90% acc independently; 100% acc given cues to review/check for mistakes. Cues required for patient to place completed memos in specific pile to increase workflow   7. Patient will complete mental manipulation tasks with 90% accuracy given min cues to improve working memory/mental flexibility.      Progressing/ Not Met 12/30/2020   Not formally addressed     8. Patient will complete word finding tasks with semantic relationships (I.e. Similarities and differences, synonyms, antonyms, semantic category) with 90% acc independently to improve word finding.      METx2 Goal Met / Discontinue      9. Patient will complete semantic feature analysis (SFA) chart when given an object/verb with 90% acc given min cues to improve word finding abilities   Progressing/ Not Met 12/30/2020   Not formally  addressed           Patient Education/Response:   Discussed examples of functional use/implementation of memory strategies. Discussed SFA. Patient verbalized understanding.     Written Home Exercises Provided: SFA worksheets  Exercises were reviewed and Diego was able to demonstrate them prior to the end of the session.  Diego demonstrated good  understanding of the education provided.     Assessment:   Diego is progressing well towards his goals. Improved ability to complete/attend to auditory/visual tasks simultaneously this date as compared to previous sessions. Increased independent strategy use observed. Very good organization/planning skills noted today within moderately complex calendar/scheduling task. Patient reports that this is helpful as he and his wife use a calendar system at home. Patient benefited from organizational cues (I.e., place all completed memos in specific pile in order to know that they have been accounted for). Current goals remain appropriate. Goals to be updated as necessary.     Pt prognosis is Fair-good. Pt will continue to benefit from skilled outpatient speech and language therapy to address the deficits listed in the problem list on initial evaluation, provide pt/family education and to maximize pt's level of independence in the home and community environment.   Medical necessity is demonstrated by the following IMPAIRMENTS:  Communication and cognitive deficits  Barriers to Therapy: None identified.   Pt's spiritual, cultural and educational needs considered and pt agreeable to plan of care and goals.  Plan:   Continue POC with focus on improving communication and cognitive deficits.    BRAEDEN Lowery, CCC-SLP  Speech Language Pathologist   12/30/2020

## 2020-12-30 ENCOUNTER — CLINICAL SUPPORT (OUTPATIENT)
Dept: REHABILITATION | Facility: HOSPITAL | Age: 80
End: 2020-12-30
Attending: NEUROLOGICAL SURGERY
Payer: MEDICARE

## 2020-12-30 DIAGNOSIS — R41.841 COGNITIVE COMMUNICATION DEFICIT: Primary | ICD-10-CM

## 2020-12-30 PROCEDURE — 97130 THER IVNTJ EA ADDL 15 MIN: CPT | Mod: PN | Performed by: SPEECH-LANGUAGE PATHOLOGIST

## 2020-12-30 PROCEDURE — 97129 THER IVNTJ 1ST 15 MIN: CPT | Mod: PN | Performed by: SPEECH-LANGUAGE PATHOLOGIST

## 2021-01-03 ENCOUNTER — PATIENT MESSAGE (OUTPATIENT)
Dept: FAMILY MEDICINE | Facility: CLINIC | Age: 81
End: 2021-01-03

## 2021-01-04 ENCOUNTER — OFFICE VISIT (OUTPATIENT)
Dept: OTOLARYNGOLOGY | Facility: CLINIC | Age: 81
End: 2021-01-04
Payer: MEDICARE

## 2021-01-04 DIAGNOSIS — H61.23 BILATERAL IMPACTED CERUMEN: Primary | ICD-10-CM

## 2021-01-04 PROCEDURE — 99999 PR PBB SHADOW E&M-EST. PATIENT-LVL III: ICD-10-PCS | Mod: PBBFAC,,, | Performed by: NURSE PRACTITIONER

## 2021-01-04 PROCEDURE — 1157F ADVNC CARE PLAN IN RCRD: CPT | Mod: S$GLB,,, | Performed by: NURSE PRACTITIONER

## 2021-01-04 PROCEDURE — 1126F AMNT PAIN NOTED NONE PRSNT: CPT | Mod: S$GLB,,, | Performed by: NURSE PRACTITIONER

## 2021-01-04 PROCEDURE — 99999 PR PBB SHADOW E&M-EST. PATIENT-LVL III: CPT | Mod: PBBFAC,,, | Performed by: NURSE PRACTITIONER

## 2021-01-04 PROCEDURE — 99499 UNLISTED E&M SERVICE: CPT | Mod: S$GLB,,, | Performed by: NURSE PRACTITIONER

## 2021-01-04 PROCEDURE — 3288F PR FALLS RISK ASSESSMENT DOCUMENTED: ICD-10-PCS | Mod: CPTII,S$GLB,, | Performed by: NURSE PRACTITIONER

## 2021-01-04 PROCEDURE — 99499 NO LOS: ICD-10-PCS | Mod: S$GLB,,, | Performed by: NURSE PRACTITIONER

## 2021-01-04 PROCEDURE — 1157F PR ADVANCE CARE PLAN OR EQUIV PRESENT IN MEDICAL RECORD: ICD-10-PCS | Mod: S$GLB,,, | Performed by: NURSE PRACTITIONER

## 2021-01-04 PROCEDURE — 1101F PR PT FALLS ASSESS DOC 0-1 FALLS W/OUT INJ PAST YR: ICD-10-PCS | Mod: CPTII,S$GLB,, | Performed by: NURSE PRACTITIONER

## 2021-01-04 PROCEDURE — 3288F FALL RISK ASSESSMENT DOCD: CPT | Mod: CPTII,S$GLB,, | Performed by: NURSE PRACTITIONER

## 2021-01-04 PROCEDURE — 69210 REMOVE IMPACTED EAR WAX UNI: CPT | Mod: S$GLB,,, | Performed by: NURSE PRACTITIONER

## 2021-01-04 PROCEDURE — 1126F PR PAIN SEVERITY QUANTIFIED, NO PAIN PRESENT: ICD-10-PCS | Mod: S$GLB,,, | Performed by: NURSE PRACTITIONER

## 2021-01-04 PROCEDURE — 1101F PT FALLS ASSESS-DOCD LE1/YR: CPT | Mod: CPTII,S$GLB,, | Performed by: NURSE PRACTITIONER

## 2021-01-04 PROCEDURE — 69210 EAR CERUMEN REMOVAL: ICD-10-PCS | Mod: S$GLB,,, | Performed by: NURSE PRACTITIONER

## 2021-01-08 ENCOUNTER — CLINICAL SUPPORT (OUTPATIENT)
Dept: REHABILITATION | Facility: HOSPITAL | Age: 81
End: 2021-01-08
Attending: NEUROLOGICAL SURGERY
Payer: MEDICARE

## 2021-01-08 DIAGNOSIS — R41.841 COGNITIVE COMMUNICATION DEFICIT: Primary | ICD-10-CM

## 2021-01-08 PROCEDURE — 97129 THER IVNTJ 1ST 15 MIN: CPT | Mod: PN | Performed by: SPEECH-LANGUAGE PATHOLOGIST

## 2021-01-08 PROCEDURE — 97130 THER IVNTJ EA ADDL 15 MIN: CPT | Mod: PN | Performed by: SPEECH-LANGUAGE PATHOLOGIST

## 2021-01-15 ENCOUNTER — CLINICAL SUPPORT (OUTPATIENT)
Dept: REHABILITATION | Facility: HOSPITAL | Age: 81
End: 2021-01-15
Attending: NEUROLOGICAL SURGERY
Payer: MEDICARE

## 2021-01-15 DIAGNOSIS — R41.841 COGNITIVE COMMUNICATION DEFICIT: Primary | ICD-10-CM

## 2021-01-15 PROCEDURE — 97130 THER IVNTJ EA ADDL 15 MIN: CPT | Mod: PN | Performed by: SPEECH-LANGUAGE PATHOLOGIST

## 2021-01-15 PROCEDURE — 97129 THER IVNTJ 1ST 15 MIN: CPT | Mod: PN | Performed by: SPEECH-LANGUAGE PATHOLOGIST

## 2021-01-22 ENCOUNTER — CLINICAL SUPPORT (OUTPATIENT)
Dept: REHABILITATION | Facility: HOSPITAL | Age: 81
End: 2021-01-22
Attending: NEUROLOGICAL SURGERY
Payer: MEDICARE

## 2021-01-22 ENCOUNTER — NURSE TRIAGE (OUTPATIENT)
Dept: ADMINISTRATIVE | Facility: CLINIC | Age: 81
End: 2021-01-22

## 2021-01-22 DIAGNOSIS — R41.841 COGNITIVE COMMUNICATION DEFICIT: Primary | ICD-10-CM

## 2021-01-22 PROCEDURE — 97129 THER IVNTJ 1ST 15 MIN: CPT | Mod: PN | Performed by: SPEECH-LANGUAGE PATHOLOGIST

## 2021-01-22 PROCEDURE — 97130 THER IVNTJ EA ADDL 15 MIN: CPT | Mod: PN | Performed by: SPEECH-LANGUAGE PATHOLOGIST

## 2021-01-26 ENCOUNTER — OFFICE VISIT (OUTPATIENT)
Dept: FAMILY MEDICINE | Facility: CLINIC | Age: 81
End: 2021-01-26
Payer: MEDICARE

## 2021-01-26 ENCOUNTER — LAB VISIT (OUTPATIENT)
Dept: FAMILY MEDICINE | Facility: CLINIC | Age: 81
End: 2021-01-26
Payer: MEDICARE

## 2021-01-26 VITALS
TEMPERATURE: 97 F | HEIGHT: 67 IN | DIASTOLIC BLOOD PRESSURE: 60 MMHG | WEIGHT: 161.38 LBS | OXYGEN SATURATION: 96 % | SYSTOLIC BLOOD PRESSURE: 102 MMHG | HEART RATE: 99 BPM | BODY MASS INDEX: 25.33 KG/M2

## 2021-01-26 DIAGNOSIS — J06.9 VIRAL UPPER RESPIRATORY TRACT INFECTION: Primary | ICD-10-CM

## 2021-01-26 DIAGNOSIS — R05.9 COUGH: ICD-10-CM

## 2021-01-26 PROCEDURE — 1101F PT FALLS ASSESS-DOCD LE1/YR: CPT | Mod: CPTII,S$GLB,, | Performed by: FAMILY MEDICINE

## 2021-01-26 PROCEDURE — 3288F FALL RISK ASSESSMENT DOCD: CPT | Mod: CPTII,S$GLB,, | Performed by: FAMILY MEDICINE

## 2021-01-26 PROCEDURE — 1159F MED LIST DOCD IN RCRD: CPT | Mod: S$GLB,,, | Performed by: FAMILY MEDICINE

## 2021-01-26 PROCEDURE — 3074F PR MOST RECENT SYSTOLIC BLOOD PRESSURE < 130 MM HG: ICD-10-PCS | Mod: CPTII,S$GLB,, | Performed by: FAMILY MEDICINE

## 2021-01-26 PROCEDURE — 99213 OFFICE O/P EST LOW 20 MIN: CPT | Mod: S$GLB,,, | Performed by: FAMILY MEDICINE

## 2021-01-26 PROCEDURE — 99213 PR OFFICE/OUTPT VISIT, EST, LEVL III, 20-29 MIN: ICD-10-PCS | Mod: S$GLB,,, | Performed by: FAMILY MEDICINE

## 2021-01-26 PROCEDURE — 1157F PR ADVANCE CARE PLAN OR EQUIV PRESENT IN MEDICAL RECORD: ICD-10-PCS | Mod: S$GLB,,, | Performed by: FAMILY MEDICINE

## 2021-01-26 PROCEDURE — 99999 PR PBB SHADOW E&M-EST. PATIENT-LVL IV: ICD-10-PCS | Mod: PBBFAC,,, | Performed by: FAMILY MEDICINE

## 2021-01-26 PROCEDURE — 1101F PR PT FALLS ASSESS DOC 0-1 FALLS W/OUT INJ PAST YR: ICD-10-PCS | Mod: CPTII,S$GLB,, | Performed by: FAMILY MEDICINE

## 2021-01-26 PROCEDURE — 3078F DIAST BP <80 MM HG: CPT | Mod: CPTII,S$GLB,, | Performed by: FAMILY MEDICINE

## 2021-01-26 PROCEDURE — 1125F PR PAIN SEVERITY QUANTIFIED, PAIN PRESENT: ICD-10-PCS | Mod: S$GLB,,, | Performed by: FAMILY MEDICINE

## 2021-01-26 PROCEDURE — 1157F ADVNC CARE PLAN IN RCRD: CPT | Mod: S$GLB,,, | Performed by: FAMILY MEDICINE

## 2021-01-26 PROCEDURE — 1159F PR MEDICATION LIST DOCUMENTED IN MEDICAL RECORD: ICD-10-PCS | Mod: S$GLB,,, | Performed by: FAMILY MEDICINE

## 2021-01-26 PROCEDURE — 1125F AMNT PAIN NOTED PAIN PRSNT: CPT | Mod: S$GLB,,, | Performed by: FAMILY MEDICINE

## 2021-01-26 PROCEDURE — 3288F PR FALLS RISK ASSESSMENT DOCUMENTED: ICD-10-PCS | Mod: CPTII,S$GLB,, | Performed by: FAMILY MEDICINE

## 2021-01-26 PROCEDURE — 99999 PR PBB SHADOW E&M-EST. PATIENT-LVL IV: CPT | Mod: PBBFAC,,, | Performed by: FAMILY MEDICINE

## 2021-01-26 PROCEDURE — 3078F PR MOST RECENT DIASTOLIC BLOOD PRESSURE < 80 MM HG: ICD-10-PCS | Mod: CPTII,S$GLB,, | Performed by: FAMILY MEDICINE

## 2021-01-26 PROCEDURE — 3074F SYST BP LT 130 MM HG: CPT | Mod: CPTII,S$GLB,, | Performed by: FAMILY MEDICINE

## 2021-01-26 PROCEDURE — U0003 INFECTIOUS AGENT DETECTION BY NUCLEIC ACID (DNA OR RNA); SEVERE ACUTE RESPIRATORY SYNDROME CORONAVIRUS 2 (SARS-COV-2) (CORONAVIRUS DISEASE [COVID-19]), AMPLIFIED PROBE TECHNIQUE, MAKING USE OF HIGH THROUGHPUT TECHNOLOGIES AS DESCRIBED BY CMS-2020-01-R: HCPCS

## 2021-01-26 RX ORDER — PROMETHAZINE HYDROCHLORIDE AND DEXTROMETHORPHAN HYDROBROMIDE 6.25; 15 MG/5ML; MG/5ML
SYRUP ORAL
Qty: 240 ML | Refills: 0 | Status: SHIPPED | OUTPATIENT
Start: 2021-01-26 | End: 2022-07-08

## 2021-01-27 ENCOUNTER — TELEPHONE (OUTPATIENT)
Dept: FAMILY MEDICINE | Facility: CLINIC | Age: 81
End: 2021-01-27

## 2021-01-27 DIAGNOSIS — U07.1 COVID-19 VIRUS DETECTED: ICD-10-CM

## 2021-01-27 DIAGNOSIS — U07.1 COVID-19 VIRUS DETECTED: Primary | ICD-10-CM

## 2021-01-27 LAB — SARS-COV-2 RNA RESP QL NAA+PROBE: DETECTED

## 2021-01-28 ENCOUNTER — INFUSION (OUTPATIENT)
Dept: INFECTIOUS DISEASES | Facility: HOSPITAL | Age: 81
DRG: 177 | End: 2021-01-28
Attending: FAMILY MEDICINE
Payer: MEDICARE

## 2021-01-28 VITALS
SYSTOLIC BLOOD PRESSURE: 111 MMHG | DIASTOLIC BLOOD PRESSURE: 64 MMHG | OXYGEN SATURATION: 97 % | HEART RATE: 59 BPM | RESPIRATION RATE: 18 BRPM | WEIGHT: 163 LBS | HEIGHT: 69 IN | BODY MASS INDEX: 24.14 KG/M2 | TEMPERATURE: 98 F

## 2021-01-28 DIAGNOSIS — U07.1 COVID-19 VIRUS DETECTED: ICD-10-CM

## 2021-01-28 PROCEDURE — 25000003 PHARM REV CODE 250

## 2021-01-28 PROCEDURE — M0239 BAMLANIVIMAB-XXXX INFUSION: HCPCS

## 2021-01-28 PROCEDURE — 63600175 PHARM REV CODE 636 W HCPCS

## 2021-01-28 RX ORDER — SODIUM CHLORIDE 0.9 % (FLUSH) 0.9 %
10 SYRINGE (ML) INJECTION
Status: DISCONTINUED | OUTPATIENT
Start: 2021-01-28 | End: 2022-03-11

## 2021-01-28 RX ORDER — ONDANSETRON 4 MG/1
4 TABLET, ORALLY DISINTEGRATING ORAL ONCE AS NEEDED
Status: DISCONTINUED | OUTPATIENT
Start: 2021-01-28 | End: 2022-03-11

## 2021-01-28 RX ORDER — ACETAMINOPHEN 325 MG/1
650 TABLET ORAL ONCE AS NEEDED
Status: DISCONTINUED | OUTPATIENT
Start: 2021-01-28 | End: 2022-03-11

## 2021-01-28 RX ORDER — DIPHENHYDRAMINE HYDROCHLORIDE 50 MG/ML
25 INJECTION INTRAMUSCULAR; INTRAVENOUS ONCE AS NEEDED
Status: DISCONTINUED | OUTPATIENT
Start: 2021-01-28 | End: 2021-02-04 | Stop reason: HOSPADM

## 2021-01-28 RX ORDER — EPINEPHRINE 0.1 MG/ML
0.3 INJECTION INTRAVENOUS
Status: DISCONTINUED | OUTPATIENT
Start: 2021-01-28 | End: 2021-02-04 | Stop reason: HOSPADM

## 2021-01-28 RX ORDER — ALBUTEROL SULFATE 90 UG/1
2 AEROSOL, METERED RESPIRATORY (INHALATION)
Status: DISCONTINUED | OUTPATIENT
Start: 2021-01-28 | End: 2022-03-11

## 2021-01-28 RX ADMIN — SODIUM CHLORIDE 700 MG: 0.9 INJECTION, SOLUTION INTRAVENOUS at 12:01

## 2021-01-29 ENCOUNTER — TELEPHONE (OUTPATIENT)
Dept: ADMINISTRATIVE | Facility: CLINIC | Age: 81
End: 2021-01-29

## 2021-01-30 ENCOUNTER — HOSPITAL ENCOUNTER (INPATIENT)
Facility: HOSPITAL | Age: 81
LOS: 5 days | Discharge: HOME | DRG: 177 | End: 2021-02-04
Attending: EMERGENCY MEDICINE | Admitting: STUDENT IN AN ORGANIZED HEALTH CARE EDUCATION/TRAINING PROGRAM
Payer: MEDICARE

## 2021-01-30 ENCOUNTER — NURSE TRIAGE (OUTPATIENT)
Dept: ADMINISTRATIVE | Facility: CLINIC | Age: 81
End: 2021-01-30

## 2021-01-30 ENCOUNTER — PATIENT MESSAGE (OUTPATIENT)
Dept: PULMONOLOGY | Facility: CLINIC | Age: 81
End: 2021-01-30

## 2021-01-30 DIAGNOSIS — R04.2 HEMOPTYSIS: Primary | ICD-10-CM

## 2021-01-30 DIAGNOSIS — J12.82 PNEUMONIA DUE TO COVID-19 VIRUS: ICD-10-CM

## 2021-01-30 DIAGNOSIS — U07.1 PNEUMONIA DUE TO COVID-19 VIRUS: ICD-10-CM

## 2021-01-30 DIAGNOSIS — Z20.822 SUSPECTED COVID-19 VIRUS INFECTION: ICD-10-CM

## 2021-01-30 DIAGNOSIS — I26.99 PULMONARY EMBOLISM, UNSPECIFIED CHRONICITY, UNSPECIFIED PULMONARY EMBOLISM TYPE, UNSPECIFIED WHETHER ACUTE COR PULMONALE PRESENT: ICD-10-CM

## 2021-01-30 PROBLEM — R00.1 BRADYCARDIA: Status: ACTIVE | Noted: 2021-01-30

## 2021-01-30 PROBLEM — E11.59 HYPERTENSION ASSOCIATED WITH TYPE 2 DIABETES MELLITUS: Status: ACTIVE | Noted: 2021-01-30

## 2021-01-30 PROBLEM — I15.2 HYPERTENSION ASSOCIATED WITH TYPE 2 DIABETES MELLITUS: Status: ACTIVE | Noted: 2021-01-30

## 2021-01-30 LAB
ALBUMIN SERPL BCP-MCNC: 3.4 G/DL (ref 3.5–5.2)
ALP SERPL-CCNC: 58 U/L (ref 55–135)
ALT SERPL W/O P-5'-P-CCNC: 16 U/L (ref 10–44)
ANION GAP SERPL CALC-SCNC: 12 MMOL/L (ref 8–16)
APTT BLDCRRT: 30.2 SEC (ref 21–32)
APTT BLDCRRT: 35 SEC (ref 21–32)
AST SERPL-CCNC: 25 U/L (ref 10–40)
BASOPHILS # BLD AUTO: 0.01 K/UL (ref 0–0.2)
BASOPHILS # BLD AUTO: 0.02 K/UL (ref 0–0.2)
BASOPHILS NFR BLD: 0.2 % (ref 0–1.9)
BASOPHILS NFR BLD: 0.4 % (ref 0–1.9)
BILIRUB SERPL-MCNC: 0.5 MG/DL (ref 0.1–1)
BNP SERPL-MCNC: <10 PG/ML (ref 0–99)
BUN SERPL-MCNC: 6 MG/DL (ref 8–23)
CALCIUM SERPL-MCNC: 8.4 MG/DL (ref 8.7–10.5)
CHLORIDE SERPL-SCNC: 106 MMOL/L (ref 95–110)
CK SERPL-CCNC: 57 U/L (ref 20–200)
CO2 SERPL-SCNC: 24 MMOL/L (ref 23–29)
CREAT SERPL-MCNC: 0.8 MG/DL (ref 0.5–1.4)
CRP SERPL-MCNC: 12.6 MG/L (ref 0–8.2)
D DIMER PPP IA.FEU-MCNC: 0.71 MG/L FEU
DIFFERENTIAL METHOD: ABNORMAL
DIFFERENTIAL METHOD: ABNORMAL
EOSINOPHIL # BLD AUTO: 0.1 K/UL (ref 0–0.5)
EOSINOPHIL # BLD AUTO: 0.1 K/UL (ref 0–0.5)
EOSINOPHIL NFR BLD: 1 % (ref 0–8)
EOSINOPHIL NFR BLD: 1.7 % (ref 0–8)
ERYTHROCYTE [DISTWIDTH] IN BLOOD BY AUTOMATED COUNT: 13.3 % (ref 11.5–14.5)
ERYTHROCYTE [DISTWIDTH] IN BLOOD BY AUTOMATED COUNT: 13.4 % (ref 11.5–14.5)
EST. GFR  (AFRICAN AMERICAN): >60 ML/MIN/1.73 M^2
EST. GFR  (NON AFRICAN AMERICAN): >60 ML/MIN/1.73 M^2
FERRITIN SERPL-MCNC: 379 NG/ML (ref 20–300)
GLUCOSE SERPL-MCNC: 100 MG/DL (ref 70–110)
HCT VFR BLD AUTO: 41.6 % (ref 40–54)
HCT VFR BLD AUTO: 44.5 % (ref 40–54)
HGB BLD-MCNC: 13.6 G/DL (ref 14–18)
HGB BLD-MCNC: 14.2 G/DL (ref 14–18)
IMM GRANULOCYTES # BLD AUTO: 0.02 K/UL (ref 0–0.04)
IMM GRANULOCYTES # BLD AUTO: 0.02 K/UL (ref 0–0.04)
IMM GRANULOCYTES NFR BLD AUTO: 0.4 % (ref 0–0.5)
IMM GRANULOCYTES NFR BLD AUTO: 0.4 % (ref 0–0.5)
INR PPP: 1 (ref 0.8–1.2)
INR PPP: 1.1 (ref 0.8–1.2)
LACTATE SERPL-SCNC: 1 MMOL/L (ref 0.5–2.2)
LDH SERPL L TO P-CCNC: 296 U/L (ref 110–260)
LYMPHOCYTES # BLD AUTO: 1.4 K/UL (ref 1–4.8)
LYMPHOCYTES # BLD AUTO: 1.7 K/UL (ref 1–4.8)
LYMPHOCYTES NFR BLD: 28.2 % (ref 18–48)
LYMPHOCYTES NFR BLD: 34.6 % (ref 18–48)
MCH RBC QN AUTO: 28.3 PG (ref 27–31)
MCH RBC QN AUTO: 28.6 PG (ref 27–31)
MCHC RBC AUTO-ENTMCNC: 31.9 G/DL (ref 32–36)
MCHC RBC AUTO-ENTMCNC: 32.7 G/DL (ref 32–36)
MCV RBC AUTO: 88 FL (ref 82–98)
MCV RBC AUTO: 89 FL (ref 82–98)
MONOCYTES # BLD AUTO: 0.6 K/UL (ref 0.3–1)
MONOCYTES # BLD AUTO: 0.7 K/UL (ref 0.3–1)
MONOCYTES NFR BLD: 12.1 % (ref 4–15)
MONOCYTES NFR BLD: 13.3 % (ref 4–15)
NEUTROPHILS # BLD AUTO: 2.5 K/UL (ref 1.8–7.7)
NEUTROPHILS # BLD AUTO: 2.8 K/UL (ref 1.8–7.7)
NEUTROPHILS NFR BLD: 51 % (ref 38–73)
NEUTROPHILS NFR BLD: 56.7 % (ref 38–73)
NRBC BLD-RTO: 0 /100 WBC
NRBC BLD-RTO: 0 /100 WBC
PLATELET # BLD AUTO: 206 K/UL (ref 150–350)
PLATELET # BLD AUTO: 236 K/UL (ref 150–350)
PMV BLD AUTO: 9.4 FL (ref 9.2–12.9)
PMV BLD AUTO: 9.7 FL (ref 9.2–12.9)
POCT GLUCOSE: 115 MG/DL (ref 70–110)
POTASSIUM SERPL-SCNC: 4 MMOL/L (ref 3.5–5.1)
PROCALCITONIN SERPL IA-MCNC: 0.02 NG/ML
PROT SERPL-MCNC: 6.8 G/DL (ref 6–8.4)
PROTHROMBIN TIME: 10.8 SEC (ref 9–12.5)
PROTHROMBIN TIME: 11.1 SEC (ref 9–12.5)
RBC # BLD AUTO: 4.75 M/UL (ref 4.6–6.2)
RBC # BLD AUTO: 5.02 M/UL (ref 4.6–6.2)
SODIUM SERPL-SCNC: 142 MMOL/L (ref 136–145)
TROPONIN I SERPL DL<=0.01 NG/ML-MCNC: <0.006 NG/ML (ref 0–0.03)
WBC # BLD AUTO: 4.8 K/UL (ref 3.9–12.7)
WBC # BLD AUTO: 4.9 K/UL (ref 3.9–12.7)

## 2021-01-30 PROCEDURE — 96372 THER/PROPH/DIAG INJ SC/IM: CPT | Mod: 59

## 2021-01-30 PROCEDURE — 86140 C-REACTIVE PROTEIN: CPT

## 2021-01-30 PROCEDURE — 85025 COMPLETE CBC W/AUTO DIFF WBC: CPT | Mod: 91

## 2021-01-30 PROCEDURE — 82550 ASSAY OF CK (CPK): CPT

## 2021-01-30 PROCEDURE — 93010 EKG 12-LEAD: ICD-10-PCS | Mod: ,,, | Performed by: INTERNAL MEDICINE

## 2021-01-30 PROCEDURE — 80053 COMPREHEN METABOLIC PANEL: CPT

## 2021-01-30 PROCEDURE — 83615 LACTATE (LD) (LDH) ENZYME: CPT

## 2021-01-30 PROCEDURE — 63600175 PHARM REV CODE 636 W HCPCS: Performed by: PHYSICIAN ASSISTANT

## 2021-01-30 PROCEDURE — 85610 PROTHROMBIN TIME: CPT

## 2021-01-30 PROCEDURE — 87040 BLOOD CULTURE FOR BACTERIA: CPT

## 2021-01-30 PROCEDURE — 63600175 PHARM REV CODE 636 W HCPCS: Performed by: EMERGENCY MEDICINE

## 2021-01-30 PROCEDURE — 93010 ELECTROCARDIOGRAM REPORT: CPT | Mod: ,,, | Performed by: INTERNAL MEDICINE

## 2021-01-30 PROCEDURE — 11000001 HC ACUTE MED/SURG PRIVATE ROOM

## 2021-01-30 PROCEDURE — 85730 THROMBOPLASTIN TIME PARTIAL: CPT | Mod: 91

## 2021-01-30 PROCEDURE — 85730 THROMBOPLASTIN TIME PARTIAL: CPT

## 2021-01-30 PROCEDURE — 85379 FIBRIN DEGRADATION QUANT: CPT

## 2021-01-30 PROCEDURE — 25000003 PHARM REV CODE 250: Performed by: PHYSICIAN ASSISTANT

## 2021-01-30 PROCEDURE — 36415 COLL VENOUS BLD VENIPUNCTURE: CPT

## 2021-01-30 PROCEDURE — 99285 EMERGENCY DEPT VISIT HI MDM: CPT | Mod: 25

## 2021-01-30 PROCEDURE — 99284 EMERGENCY DEPT VISIT MOD MDM: CPT | Mod: 25

## 2021-01-30 PROCEDURE — 25000003 PHARM REV CODE 250: Performed by: STUDENT IN AN ORGANIZED HEALTH CARE EDUCATION/TRAINING PROGRAM

## 2021-01-30 PROCEDURE — 83880 ASSAY OF NATRIURETIC PEPTIDE: CPT

## 2021-01-30 PROCEDURE — 84145 PROCALCITONIN (PCT): CPT

## 2021-01-30 PROCEDURE — 83605 ASSAY OF LACTIC ACID: CPT

## 2021-01-30 PROCEDURE — 93005 ELECTROCARDIOGRAM TRACING: CPT

## 2021-01-30 PROCEDURE — 85610 PROTHROMBIN TIME: CPT | Mod: 91

## 2021-01-30 PROCEDURE — 82728 ASSAY OF FERRITIN: CPT

## 2021-01-30 PROCEDURE — 84484 ASSAY OF TROPONIN QUANT: CPT

## 2021-01-30 PROCEDURE — 25500020 PHARM REV CODE 255: Performed by: EMERGENCY MEDICINE

## 2021-01-30 RX ORDER — GUAIFENESIN 100 MG/5ML
200 SOLUTION ORAL EVERY 4 HOURS PRN
Status: DISCONTINUED | OUTPATIENT
Start: 2021-01-30 | End: 2021-02-02

## 2021-01-30 RX ORDER — ENOXAPARIN SODIUM 100 MG/ML
40 INJECTION SUBCUTANEOUS
Status: DISCONTINUED | OUTPATIENT
Start: 2021-01-30 | End: 2021-01-30

## 2021-01-30 RX ORDER — ASCORBIC ACID 500 MG
500 TABLET ORAL 2 TIMES DAILY
Status: DISCONTINUED | OUTPATIENT
Start: 2021-01-30 | End: 2021-02-04 | Stop reason: HOSPADM

## 2021-01-30 RX ORDER — ACETAMINOPHEN 325 MG/1
650 TABLET ORAL EVERY 4 HOURS PRN
Status: DISCONTINUED | OUTPATIENT
Start: 2021-01-30 | End: 2021-02-02

## 2021-01-30 RX ORDER — ACETAMINOPHEN 325 MG/1
650 TABLET ORAL EVERY 8 HOURS PRN
Status: DISCONTINUED | OUTPATIENT
Start: 2021-01-30 | End: 2021-02-04 | Stop reason: HOSPADM

## 2021-01-30 RX ORDER — GLUCAGON 1 MG
1 KIT INJECTION
Status: DISCONTINUED | OUTPATIENT
Start: 2021-01-30 | End: 2021-02-04 | Stop reason: HOSPADM

## 2021-01-30 RX ORDER — HYDROXYZINE HYDROCHLORIDE 25 MG/1
25 TABLET, FILM COATED ORAL 3 TIMES DAILY PRN
Status: DISCONTINUED | OUTPATIENT
Start: 2021-01-30 | End: 2021-02-02

## 2021-01-30 RX ORDER — CHOLECALCIFEROL (VITAMIN D3) 25 MCG
1000 TABLET ORAL DAILY
Status: DISCONTINUED | OUTPATIENT
Start: 2021-01-31 | End: 2021-02-04 | Stop reason: HOSPADM

## 2021-01-30 RX ORDER — IBUPROFEN 200 MG
16 TABLET ORAL
Status: DISCONTINUED | OUTPATIENT
Start: 2021-01-30 | End: 2021-02-04 | Stop reason: HOSPADM

## 2021-01-30 RX ORDER — ONDANSETRON 2 MG/ML
4 INJECTION INTRAMUSCULAR; INTRAVENOUS EVERY 8 HOURS PRN
Status: DISCONTINUED | OUTPATIENT
Start: 2021-01-30 | End: 2021-02-04 | Stop reason: HOSPADM

## 2021-01-30 RX ORDER — INSULIN ASPART 100 [IU]/ML
0-5 INJECTION, SOLUTION INTRAVENOUS; SUBCUTANEOUS
Status: DISCONTINUED | OUTPATIENT
Start: 2021-01-30 | End: 2021-02-04 | Stop reason: HOSPADM

## 2021-01-30 RX ORDER — GUAIFENESIN/DEXTROMETHORPHAN 100-10MG/5
10 SYRUP ORAL EVERY 4 HOURS PRN
Status: DISCONTINUED | OUTPATIENT
Start: 2021-01-30 | End: 2021-02-02

## 2021-01-30 RX ORDER — BENZONATATE 100 MG/1
100 CAPSULE ORAL 3 TIMES DAILY PRN
Status: DISCONTINUED | OUTPATIENT
Start: 2021-01-30 | End: 2021-02-02

## 2021-01-30 RX ORDER — HEPARIN SODIUM,PORCINE/D5W 25000/250
0-40 INTRAVENOUS SOLUTION INTRAVENOUS CONTINUOUS
Status: DISCONTINUED | OUTPATIENT
Start: 2021-01-30 | End: 2021-02-01

## 2021-01-30 RX ORDER — TALC
6 POWDER (GRAM) TOPICAL NIGHTLY PRN
Status: DISCONTINUED | OUTPATIENT
Start: 2021-01-30 | End: 2021-02-04 | Stop reason: HOSPADM

## 2021-01-30 RX ORDER — IBUPROFEN 200 MG
24 TABLET ORAL
Status: DISCONTINUED | OUTPATIENT
Start: 2021-01-30 | End: 2021-02-04 | Stop reason: HOSPADM

## 2021-01-30 RX ORDER — SODIUM CHLORIDE 0.9 % (FLUSH) 0.9 %
10 SYRINGE (ML) INJECTION
Status: DISCONTINUED | OUTPATIENT
Start: 2021-01-30 | End: 2021-02-04 | Stop reason: HOSPADM

## 2021-01-30 RX ADMIN — IOHEXOL 75 ML: 350 INJECTION, SOLUTION INTRAVENOUS at 04:01

## 2021-01-30 RX ADMIN — OXYCODONE HYDROCHLORIDE AND ACETAMINOPHEN 500 MG: 500 TABLET ORAL at 11:01

## 2021-01-30 RX ADMIN — BENZONATATE 100 MG: 100 CAPSULE ORAL at 11:01

## 2021-01-30 RX ADMIN — GUAIFENESIN AND DEXTROMETHORPHAN 10 ML: 100; 10 SYRUP ORAL at 11:01

## 2021-01-30 RX ADMIN — ENOXAPARIN SODIUM 40 MG: 40 INJECTION SUBCUTANEOUS at 06:01

## 2021-01-30 RX ADMIN — HEPARIN SODIUM AND DEXTROSE 18 UNITS/KG/HR: 10000; 5 INJECTION INTRAVENOUS at 11:01

## 2021-01-30 RX ADMIN — HYDROXYZINE HYDROCHLORIDE 25 MG: 25 TABLET, FILM COATED ORAL at 11:01

## 2021-01-31 LAB
ALBUMIN SERPL BCP-MCNC: 3.4 G/DL (ref 3.5–5.2)
ALP SERPL-CCNC: 57 U/L (ref 55–135)
ALT SERPL W/O P-5'-P-CCNC: 14 U/L (ref 10–44)
ANION GAP SERPL CALC-SCNC: 9 MMOL/L (ref 8–16)
APTT BLDCRRT: 39.2 SEC (ref 21–32)
APTT BLDCRRT: 75.8 SEC (ref 21–32)
APTT BLDCRRT: 86.7 SEC (ref 21–32)
APTT BLDCRRT: 95.2 SEC (ref 21–32)
AST SERPL-CCNC: 23 U/L (ref 10–40)
BASOPHILS # BLD AUTO: 0.03 K/UL (ref 0–0.2)
BASOPHILS NFR BLD: 0.6 % (ref 0–1.9)
BILIRUB SERPL-MCNC: 0.6 MG/DL (ref 0.1–1)
BUN SERPL-MCNC: 6 MG/DL (ref 8–23)
CALCIUM SERPL-MCNC: 8.6 MG/DL (ref 8.7–10.5)
CHLORIDE SERPL-SCNC: 107 MMOL/L (ref 95–110)
CO2 SERPL-SCNC: 28 MMOL/L (ref 23–29)
CREAT SERPL-MCNC: 0.8 MG/DL (ref 0.5–1.4)
DIFFERENTIAL METHOD: ABNORMAL
EOSINOPHIL # BLD AUTO: 0.1 K/UL (ref 0–0.5)
EOSINOPHIL NFR BLD: 2.3 % (ref 0–8)
ERYTHROCYTE [DISTWIDTH] IN BLOOD BY AUTOMATED COUNT: 13.2 % (ref 11.5–14.5)
EST. GFR  (AFRICAN AMERICAN): >60 ML/MIN/1.73 M^2
EST. GFR  (NON AFRICAN AMERICAN): >60 ML/MIN/1.73 M^2
GLUCOSE SERPL-MCNC: 89 MG/DL (ref 70–110)
HCT VFR BLD AUTO: 42.7 % (ref 40–54)
HGB BLD-MCNC: 13.7 G/DL (ref 14–18)
IMM GRANULOCYTES # BLD AUTO: 0.01 K/UL (ref 0–0.04)
IMM GRANULOCYTES NFR BLD AUTO: 0.2 % (ref 0–0.5)
LYMPHOCYTES # BLD AUTO: 2.2 K/UL (ref 1–4.8)
LYMPHOCYTES NFR BLD: 44.4 % (ref 18–48)
MAGNESIUM SERPL-MCNC: 2 MG/DL (ref 1.6–2.6)
MCH RBC QN AUTO: 28.3 PG (ref 27–31)
MCHC RBC AUTO-ENTMCNC: 32.1 G/DL (ref 32–36)
MCV RBC AUTO: 88 FL (ref 82–98)
MONOCYTES # BLD AUTO: 0.6 K/UL (ref 0.3–1)
MONOCYTES NFR BLD: 13 % (ref 4–15)
NEUTROPHILS # BLD AUTO: 1.9 K/UL (ref 1.8–7.7)
NEUTROPHILS NFR BLD: 39.5 % (ref 38–73)
NRBC BLD-RTO: 0 /100 WBC
PHOSPHATE SERPL-MCNC: 3.3 MG/DL (ref 2.7–4.5)
PLATELET # BLD AUTO: 239 K/UL (ref 150–350)
PMV BLD AUTO: 10.2 FL (ref 9.2–12.9)
POCT GLUCOSE: 159 MG/DL (ref 70–110)
POCT GLUCOSE: 91 MG/DL (ref 70–110)
POTASSIUM SERPL-SCNC: 4.3 MMOL/L (ref 3.5–5.1)
PROT SERPL-MCNC: 6.9 G/DL (ref 6–8.4)
RBC # BLD AUTO: 4.84 M/UL (ref 4.6–6.2)
SODIUM SERPL-SCNC: 144 MMOL/L (ref 136–145)
WBC # BLD AUTO: 4.86 K/UL (ref 3.9–12.7)

## 2021-01-31 PROCEDURE — 80053 COMPREHEN METABOLIC PANEL: CPT

## 2021-01-31 PROCEDURE — 25000003 PHARM REV CODE 250: Performed by: HOSPITALIST

## 2021-01-31 PROCEDURE — 63600175 PHARM REV CODE 636 W HCPCS: Performed by: HOSPITALIST

## 2021-01-31 PROCEDURE — 85025 COMPLETE CBC W/AUTO DIFF WBC: CPT

## 2021-01-31 PROCEDURE — 27000646 HC AEROBIKA DEVICE

## 2021-01-31 PROCEDURE — 85730 THROMBOPLASTIN TIME PARTIAL: CPT | Mod: 91

## 2021-01-31 PROCEDURE — 92610 EVALUATE SWALLOWING FUNCTION: CPT

## 2021-01-31 PROCEDURE — 84100 ASSAY OF PHOSPHORUS: CPT

## 2021-01-31 PROCEDURE — 99900035 HC TECH TIME PER 15 MIN (STAT)

## 2021-01-31 PROCEDURE — 85730 THROMBOPLASTIN TIME PARTIAL: CPT

## 2021-01-31 PROCEDURE — 25000003 PHARM REV CODE 250: Performed by: STUDENT IN AN ORGANIZED HEALTH CARE EDUCATION/TRAINING PROGRAM

## 2021-01-31 PROCEDURE — 36415 COLL VENOUS BLD VENIPUNCTURE: CPT

## 2021-01-31 PROCEDURE — 11000001 HC ACUTE MED/SURG PRIVATE ROOM

## 2021-01-31 PROCEDURE — 94761 N-INVAS EAR/PLS OXIMETRY MLT: CPT

## 2021-01-31 PROCEDURE — 25000242 PHARM REV CODE 250 ALT 637 W/ HCPCS: Performed by: HOSPITALIST

## 2021-01-31 PROCEDURE — 63600175 PHARM REV CODE 636 W HCPCS: Performed by: PHYSICIAN ASSISTANT

## 2021-01-31 PROCEDURE — 94664 DEMO&/EVAL PT USE INHALER: CPT

## 2021-01-31 PROCEDURE — 83735 ASSAY OF MAGNESIUM: CPT

## 2021-01-31 RX ADMIN — DEXAMETHASONE 6 MG: 4 TABLET ORAL at 12:01

## 2021-01-31 RX ADMIN — HEPARIN SODIUM AND DEXTROSE 12 UNITS/KG/HR: 10000; 5 INJECTION INTRAVENOUS at 09:01

## 2021-01-31 RX ADMIN — CHOLECALCIFEROL TAB 25 MCG (1000 UNIT) 1000 UNITS: 25 TAB at 09:01

## 2021-01-31 RX ADMIN — THERA TABS 1 TABLET: TAB at 12:01

## 2021-01-31 RX ADMIN — OXYCODONE HYDROCHLORIDE AND ACETAMINOPHEN 500 MG: 500 TABLET ORAL at 08:01

## 2021-01-31 RX ADMIN — REMDESIVIR 200 MG: 100 INJECTION, POWDER, LYOPHILIZED, FOR SOLUTION INTRAVENOUS at 04:01

## 2021-01-31 RX ADMIN — OXYCODONE HYDROCHLORIDE AND ACETAMINOPHEN 500 MG: 500 TABLET ORAL at 09:01

## 2021-02-01 PROBLEM — J44.9 COPD (CHRONIC OBSTRUCTIVE PULMONARY DISEASE): Status: ACTIVE | Noted: 2021-02-01

## 2021-02-01 LAB
ALBUMIN SERPL BCP-MCNC: 3.4 G/DL (ref 3.5–5.2)
ALBUMIN SERPL BCP-MCNC: 3.4 G/DL (ref 3.5–5.2)
ALP SERPL-CCNC: 57 U/L (ref 55–135)
ALP SERPL-CCNC: 57 U/L (ref 55–135)
ALT SERPL W/O P-5'-P-CCNC: 16 U/L (ref 10–44)
ALT SERPL W/O P-5'-P-CCNC: 16 U/L (ref 10–44)
ANION GAP SERPL CALC-SCNC: 10 MMOL/L (ref 8–16)
ANION GAP SERPL CALC-SCNC: 10 MMOL/L (ref 8–16)
APTT BLDCRRT: 44.4 SEC (ref 21–32)
APTT BLDCRRT: 46 SEC (ref 21–32)
AST SERPL-CCNC: 22 U/L (ref 10–40)
AST SERPL-CCNC: 22 U/L (ref 10–40)
BASOPHILS # BLD AUTO: 0 K/UL (ref 0–0.2)
BASOPHILS NFR BLD: 0 % (ref 0–1.9)
BILIRUB SERPL-MCNC: 0.4 MG/DL (ref 0.1–1)
BILIRUB SERPL-MCNC: 0.4 MG/DL (ref 0.1–1)
BUN SERPL-MCNC: 8 MG/DL (ref 8–23)
BUN SERPL-MCNC: 8 MG/DL (ref 8–23)
CALCIUM SERPL-MCNC: 8.8 MG/DL (ref 8.7–10.5)
CALCIUM SERPL-MCNC: 8.8 MG/DL (ref 8.7–10.5)
CHLORIDE SERPL-SCNC: 109 MMOL/L (ref 95–110)
CHLORIDE SERPL-SCNC: 109 MMOL/L (ref 95–110)
CO2 SERPL-SCNC: 24 MMOL/L (ref 23–29)
CO2 SERPL-SCNC: 24 MMOL/L (ref 23–29)
CREAT SERPL-MCNC: 0.8 MG/DL (ref 0.5–1.4)
CREAT SERPL-MCNC: 0.8 MG/DL (ref 0.5–1.4)
CRP SERPL-MCNC: 21.4 MG/L (ref 0–8.2)
D DIMER PPP IA.FEU-MCNC: 0.32 MG/L FEU
DIFFERENTIAL METHOD: ABNORMAL
EOSINOPHIL # BLD AUTO: 0 K/UL (ref 0–0.5)
EOSINOPHIL NFR BLD: 0 % (ref 0–8)
ERYTHROCYTE [DISTWIDTH] IN BLOOD BY AUTOMATED COUNT: 13.3 % (ref 11.5–14.5)
EST. GFR  (AFRICAN AMERICAN): >60 ML/MIN/1.73 M^2
EST. GFR  (AFRICAN AMERICAN): >60 ML/MIN/1.73 M^2
EST. GFR  (NON AFRICAN AMERICAN): >60 ML/MIN/1.73 M^2
EST. GFR  (NON AFRICAN AMERICAN): >60 ML/MIN/1.73 M^2
FERRITIN SERPL-MCNC: 396 NG/ML (ref 20–300)
GLUCOSE SERPL-MCNC: 165 MG/DL (ref 70–110)
GLUCOSE SERPL-MCNC: 165 MG/DL (ref 70–110)
HCT VFR BLD AUTO: 39.5 % (ref 40–54)
HGB BLD-MCNC: 13.3 G/DL (ref 14–18)
IMM GRANULOCYTES # BLD AUTO: 0.01 K/UL (ref 0–0.04)
IMM GRANULOCYTES NFR BLD AUTO: 0.3 % (ref 0–0.5)
LYMPHOCYTES # BLD AUTO: 0.9 K/UL (ref 1–4.8)
LYMPHOCYTES NFR BLD: 31 % (ref 18–48)
MAGNESIUM SERPL-MCNC: 2.1 MG/DL (ref 1.6–2.6)
MCH RBC QN AUTO: 28.6 PG (ref 27–31)
MCHC RBC AUTO-ENTMCNC: 33.7 G/DL (ref 32–36)
MCV RBC AUTO: 85 FL (ref 82–98)
MONOCYTES # BLD AUTO: 0.1 K/UL (ref 0.3–1)
MONOCYTES NFR BLD: 4.5 % (ref 4–15)
NEUTROPHILS # BLD AUTO: 1.9 K/UL (ref 1.8–7.7)
NEUTROPHILS NFR BLD: 64.2 % (ref 38–73)
NRBC BLD-RTO: 0 /100 WBC
PLATELET # BLD AUTO: 257 K/UL (ref 150–350)
PMV BLD AUTO: 10.3 FL (ref 9.2–12.9)
POCT GLUCOSE: 137 MG/DL (ref 70–110)
POCT GLUCOSE: 153 MG/DL (ref 70–110)
POCT GLUCOSE: 164 MG/DL (ref 70–110)
POCT GLUCOSE: 177 MG/DL (ref 70–110)
POTASSIUM SERPL-SCNC: 4.3 MMOL/L (ref 3.5–5.1)
POTASSIUM SERPL-SCNC: 4.3 MMOL/L (ref 3.5–5.1)
PROT SERPL-MCNC: 6.9 G/DL (ref 6–8.4)
PROT SERPL-MCNC: 6.9 G/DL (ref 6–8.4)
RBC # BLD AUTO: 4.65 M/UL (ref 4.6–6.2)
SODIUM SERPL-SCNC: 143 MMOL/L (ref 136–145)
SODIUM SERPL-SCNC: 143 MMOL/L (ref 136–145)
WBC # BLD AUTO: 2.9 K/UL (ref 3.9–12.7)

## 2021-02-01 PROCEDURE — 25000003 PHARM REV CODE 250: Performed by: HOSPITALIST

## 2021-02-01 PROCEDURE — 80053 COMPREHEN METABOLIC PANEL: CPT

## 2021-02-01 PROCEDURE — 11000001 HC ACUTE MED/SURG PRIVATE ROOM

## 2021-02-01 PROCEDURE — 94799 UNLISTED PULMONARY SVC/PX: CPT

## 2021-02-01 PROCEDURE — 85730 THROMBOPLASTIN TIME PARTIAL: CPT | Mod: 91

## 2021-02-01 PROCEDURE — 99223 PR INITIAL HOSPITAL CARE,LEVL III: ICD-10-PCS | Mod: ,,, | Performed by: INTERNAL MEDICINE

## 2021-02-01 PROCEDURE — 99223 1ST HOSP IP/OBS HIGH 75: CPT | Mod: ,,, | Performed by: INTERNAL MEDICINE

## 2021-02-01 PROCEDURE — 94761 N-INVAS EAR/PLS OXIMETRY MLT: CPT

## 2021-02-01 PROCEDURE — 36415 COLL VENOUS BLD VENIPUNCTURE: CPT

## 2021-02-01 PROCEDURE — 86140 C-REACTIVE PROTEIN: CPT

## 2021-02-01 PROCEDURE — 63600175 PHARM REV CODE 636 W HCPCS: Performed by: HOSPITALIST

## 2021-02-01 PROCEDURE — 82728 ASSAY OF FERRITIN: CPT

## 2021-02-01 PROCEDURE — 25000003 PHARM REV CODE 250: Performed by: STUDENT IN AN ORGANIZED HEALTH CARE EDUCATION/TRAINING PROGRAM

## 2021-02-01 PROCEDURE — 85025 COMPLETE CBC W/AUTO DIFF WBC: CPT

## 2021-02-01 PROCEDURE — 99900035 HC TECH TIME PER 15 MIN (STAT)

## 2021-02-01 PROCEDURE — 25000242 PHARM REV CODE 250 ALT 637 W/ HCPCS: Performed by: HOSPITALIST

## 2021-02-01 PROCEDURE — 83735 ASSAY OF MAGNESIUM: CPT

## 2021-02-01 PROCEDURE — 25000003 PHARM REV CODE 250: Performed by: PHYSICIAN ASSISTANT

## 2021-02-01 PROCEDURE — 85379 FIBRIN DEGRADATION QUANT: CPT

## 2021-02-01 PROCEDURE — 94664 DEMO&/EVAL PT USE INHALER: CPT

## 2021-02-01 RX ORDER — WARFARIN SODIUM 5 MG/1
5 TABLET ORAL DAILY
Status: DISCONTINUED | OUTPATIENT
Start: 2021-02-01 | End: 2021-02-03

## 2021-02-01 RX ORDER — ENOXAPARIN SODIUM 100 MG/ML
1 INJECTION SUBCUTANEOUS EVERY 12 HOURS
Status: DISCONTINUED | OUTPATIENT
Start: 2021-02-01 | End: 2021-02-04

## 2021-02-01 RX ADMIN — REMDESIVIR 100 MG: 100 INJECTION, POWDER, LYOPHILIZED, FOR SOLUTION INTRAVENOUS at 04:02

## 2021-02-01 RX ADMIN — CHOLECALCIFEROL TAB 25 MCG (1000 UNIT) 1000 UNITS: 25 TAB at 08:02

## 2021-02-01 RX ADMIN — OXYCODONE HYDROCHLORIDE AND ACETAMINOPHEN 500 MG: 500 TABLET ORAL at 08:02

## 2021-02-01 RX ADMIN — WARFARIN SODIUM 5 MG: 5 TABLET ORAL at 06:02

## 2021-02-01 RX ADMIN — DEXAMETHASONE 6 MG: 4 TABLET ORAL at 08:02

## 2021-02-01 RX ADMIN — THERA TABS 1 TABLET: TAB at 08:02

## 2021-02-01 RX ADMIN — GUAIFENESIN 200 MG: 200 SOLUTION ORAL at 08:02

## 2021-02-01 RX ADMIN — ENOXAPARIN SODIUM 70 MG: 80 INJECTION SUBCUTANEOUS at 08:02

## 2021-02-01 RX ADMIN — BENZONATATE 100 MG: 100 CAPSULE ORAL at 08:02

## 2021-02-02 PROBLEM — G93.40 ACUTE ENCEPHALOPATHY: Status: ACTIVE | Noted: 2021-02-02

## 2021-02-02 PROBLEM — J12.82 PNEUMONIA DUE TO COVID-19 VIRUS: Status: ACTIVE | Noted: 2021-01-30

## 2021-02-02 LAB
ALBUMIN SERPL BCP-MCNC: 3.5 G/DL (ref 3.5–5.2)
ALP SERPL-CCNC: 61 U/L (ref 55–135)
ALT SERPL W/O P-5'-P-CCNC: 16 U/L (ref 10–44)
ANION GAP SERPL CALC-SCNC: 10 MMOL/L (ref 8–16)
AST SERPL-CCNC: 26 U/L (ref 10–40)
BASOPHILS # BLD AUTO: 0.01 K/UL (ref 0–0.2)
BASOPHILS NFR BLD: 0.1 % (ref 0–1.9)
BILIRUB SERPL-MCNC: 0.4 MG/DL (ref 0.1–1)
BILIRUB UR QL STRIP: NEGATIVE
BUN SERPL-MCNC: 11 MG/DL (ref 8–23)
CALCIUM SERPL-MCNC: 8.6 MG/DL (ref 8.7–10.5)
CHLORIDE SERPL-SCNC: 108 MMOL/L (ref 95–110)
CLARITY UR: CLEAR
CO2 SERPL-SCNC: 25 MMOL/L (ref 23–29)
COLOR UR: COLORLESS
CREAT SERPL-MCNC: 0.8 MG/DL (ref 0.5–1.4)
DIFFERENTIAL METHOD: ABNORMAL
EOSINOPHIL # BLD AUTO: 0 K/UL (ref 0–0.5)
EOSINOPHIL NFR BLD: 0 % (ref 0–8)
ERYTHROCYTE [DISTWIDTH] IN BLOOD BY AUTOMATED COUNT: 13.3 % (ref 11.5–14.5)
EST. GFR  (AFRICAN AMERICAN): >60 ML/MIN/1.73 M^2
EST. GFR  (NON AFRICAN AMERICAN): >60 ML/MIN/1.73 M^2
ESTIMATED AVG GLUCOSE: 120 MG/DL (ref 68–131)
GLUCOSE SERPL-MCNC: 130 MG/DL (ref 70–110)
GLUCOSE UR QL STRIP: NEGATIVE
HBA1C MFR BLD: 5.8 % (ref 4–5.6)
HCT VFR BLD AUTO: 39.6 % (ref 40–54)
HGB BLD-MCNC: 13.7 G/DL (ref 14–18)
HGB UR QL STRIP: NEGATIVE
IMM GRANULOCYTES # BLD AUTO: 0.05 K/UL (ref 0–0.04)
IMM GRANULOCYTES NFR BLD AUTO: 0.5 % (ref 0–0.5)
INR PPP: 1.1 (ref 0.8–1.2)
KETONES UR QL STRIP: NEGATIVE
LEUKOCYTE ESTERASE UR QL STRIP: NEGATIVE
LYMPHOCYTES # BLD AUTO: 1.3 K/UL (ref 1–4.8)
LYMPHOCYTES NFR BLD: 14.2 % (ref 18–48)
MAGNESIUM SERPL-MCNC: 2 MG/DL (ref 1.6–2.6)
MCH RBC QN AUTO: 28.9 PG (ref 27–31)
MCHC RBC AUTO-ENTMCNC: 34.6 G/DL (ref 32–36)
MCV RBC AUTO: 84 FL (ref 82–98)
MONOCYTES # BLD AUTO: 1.3 K/UL (ref 0.3–1)
MONOCYTES NFR BLD: 13.5 % (ref 4–15)
NEUTROPHILS # BLD AUTO: 6.7 K/UL (ref 1.8–7.7)
NEUTROPHILS NFR BLD: 71.7 % (ref 38–73)
NITRITE UR QL STRIP: NEGATIVE
NRBC BLD-RTO: 0 /100 WBC
PH UR STRIP: 7 [PH] (ref 5–8)
PLATELET # BLD AUTO: 322 K/UL (ref 150–350)
PMV BLD AUTO: 10.7 FL (ref 9.2–12.9)
POCT GLUCOSE: 117 MG/DL (ref 70–110)
POCT GLUCOSE: 153 MG/DL (ref 70–110)
POCT GLUCOSE: 154 MG/DL (ref 70–110)
POTASSIUM SERPL-SCNC: 4 MMOL/L (ref 3.5–5.1)
PROT SERPL-MCNC: 6.8 G/DL (ref 6–8.4)
PROT UR QL STRIP: NEGATIVE
PROTHROMBIN TIME: 12 SEC (ref 9–12.5)
RBC # BLD AUTO: 4.74 M/UL (ref 4.6–6.2)
SODIUM SERPL-SCNC: 143 MMOL/L (ref 136–145)
SP GR UR STRIP: <1.005 (ref 1–1.03)
URN SPEC COLLECT METH UR: ABNORMAL
UROBILINOGEN UR STRIP-ACNC: NEGATIVE EU/DL
WBC # BLD AUTO: 9.29 K/UL (ref 3.9–12.7)

## 2021-02-02 PROCEDURE — 85025 COMPLETE CBC W/AUTO DIFF WBC: CPT

## 2021-02-02 PROCEDURE — 83735 ASSAY OF MAGNESIUM: CPT

## 2021-02-02 PROCEDURE — 63600175 PHARM REV CODE 636 W HCPCS: Performed by: HOSPITALIST

## 2021-02-02 PROCEDURE — 25000003 PHARM REV CODE 250: Performed by: HOSPITALIST

## 2021-02-02 PROCEDURE — 94799 UNLISTED PULMONARY SVC/PX: CPT

## 2021-02-02 PROCEDURE — 85610 PROTHROMBIN TIME: CPT

## 2021-02-02 PROCEDURE — 81003 URINALYSIS AUTO W/O SCOPE: CPT

## 2021-02-02 PROCEDURE — 83036 HEMOGLOBIN GLYCOSYLATED A1C: CPT

## 2021-02-02 PROCEDURE — 99222 1ST HOSP IP/OBS MODERATE 55: CPT | Mod: ,,, | Performed by: PSYCHIATRY & NEUROLOGY

## 2021-02-02 PROCEDURE — 99222 PR INITIAL HOSPITAL CARE,LEVL II: ICD-10-PCS | Mod: ,,, | Performed by: PSYCHIATRY & NEUROLOGY

## 2021-02-02 PROCEDURE — 80053 COMPREHEN METABOLIC PANEL: CPT

## 2021-02-02 PROCEDURE — 25000003 PHARM REV CODE 250: Performed by: STUDENT IN AN ORGANIZED HEALTH CARE EDUCATION/TRAINING PROGRAM

## 2021-02-02 PROCEDURE — 94640 AIRWAY INHALATION TREATMENT: CPT

## 2021-02-02 PROCEDURE — 94668 MNPJ CHEST WALL SBSQ: CPT

## 2021-02-02 PROCEDURE — 25000242 PHARM REV CODE 250 ALT 637 W/ HCPCS: Performed by: INTERNAL MEDICINE

## 2021-02-02 PROCEDURE — 25000003 PHARM REV CODE 250: Performed by: PHYSICIAN ASSISTANT

## 2021-02-02 PROCEDURE — 11000001 HC ACUTE MED/SURG PRIVATE ROOM

## 2021-02-02 PROCEDURE — 25000242 PHARM REV CODE 250 ALT 637 W/ HCPCS: Performed by: HOSPITALIST

## 2021-02-02 PROCEDURE — 36415 COLL VENOUS BLD VENIPUNCTURE: CPT

## 2021-02-02 PROCEDURE — 97165 OT EVAL LOW COMPLEX 30 MIN: CPT

## 2021-02-02 RX ORDER — GUAIFENESIN/DEXTROMETHORPHAN 100-10MG/5
5 SYRUP ORAL EVERY 6 HOURS
Status: DISCONTINUED | OUTPATIENT
Start: 2021-02-02 | End: 2021-02-04 | Stop reason: HOSPADM

## 2021-02-02 RX ORDER — BENZONATATE 100 MG/1
200 CAPSULE ORAL 3 TIMES DAILY PRN
Status: DISCONTINUED | OUTPATIENT
Start: 2021-02-02 | End: 2021-02-04 | Stop reason: HOSPADM

## 2021-02-02 RX ADMIN — WARFARIN SODIUM 5 MG: 5 TABLET ORAL at 05:02

## 2021-02-02 RX ADMIN — ENOXAPARIN SODIUM 70 MG: 80 INJECTION SUBCUTANEOUS at 08:02

## 2021-02-02 RX ADMIN — OXYCODONE HYDROCHLORIDE AND ACETAMINOPHEN 500 MG: 500 TABLET ORAL at 09:02

## 2021-02-02 RX ADMIN — CHOLECALCIFEROL TAB 25 MCG (1000 UNIT) 1000 UNITS: 25 TAB at 08:02

## 2021-02-02 RX ADMIN — ENOXAPARIN SODIUM 70 MG: 80 INJECTION SUBCUTANEOUS at 09:02

## 2021-02-02 RX ADMIN — OXYCODONE HYDROCHLORIDE AND ACETAMINOPHEN 500 MG: 500 TABLET ORAL at 08:02

## 2021-02-02 RX ADMIN — TIOTROPIUM BROMIDE INHALATION SPRAY 2 PUFF: 3.12 SPRAY, METERED RESPIRATORY (INHALATION) at 08:02

## 2021-02-02 RX ADMIN — BENZONATATE 100 MG: 100 CAPSULE ORAL at 08:02

## 2021-02-02 RX ADMIN — GUAIFENESIN AND DEXTROMETHORPHAN 10 ML: 100; 10 SYRUP ORAL at 08:02

## 2021-02-02 RX ADMIN — THERA TABS 1 TABLET: TAB at 08:02

## 2021-02-02 RX ADMIN — REMDESIVIR 100 MG: 100 INJECTION, POWDER, LYOPHILIZED, FOR SOLUTION INTRAVENOUS at 05:02

## 2021-02-02 RX ADMIN — DEXAMETHASONE 6 MG: 4 TABLET ORAL at 08:02

## 2021-02-02 RX ADMIN — GUAIFENESIN AND DEXTROMETHORPHAN 5 ML: 100; 10 SYRUP ORAL at 05:02

## 2021-02-03 ENCOUNTER — ANTI-COAG VISIT (OUTPATIENT)
Dept: CARDIOLOGY | Facility: CLINIC | Age: 81
End: 2021-02-03
Payer: MEDICARE

## 2021-02-03 DIAGNOSIS — I26.99 PULMONARY EMBOLISM, UNSPECIFIED CHRONICITY, UNSPECIFIED PULMONARY EMBOLISM TYPE, UNSPECIFIED WHETHER ACUTE COR PULMONALE PRESENT: ICD-10-CM

## 2021-02-03 DIAGNOSIS — Z79.01 LONG TERM (CURRENT) USE OF ANTICOAGULANTS: Primary | ICD-10-CM

## 2021-02-03 LAB
ALBUMIN SERPL BCP-MCNC: 3.4 G/DL (ref 3.5–5.2)
ALP SERPL-CCNC: 54 U/L (ref 55–135)
ALT SERPL W/O P-5'-P-CCNC: 24 U/L (ref 10–44)
ANION GAP SERPL CALC-SCNC: 10 MMOL/L (ref 8–16)
AST SERPL-CCNC: 30 U/L (ref 10–40)
BASOPHILS # BLD AUTO: 0.01 K/UL (ref 0–0.2)
BASOPHILS NFR BLD: 0.1 % (ref 0–1.9)
BILIRUB SERPL-MCNC: 0.4 MG/DL (ref 0.1–1)
BUN SERPL-MCNC: 12 MG/DL (ref 8–23)
CALCIUM SERPL-MCNC: 8.7 MG/DL (ref 8.7–10.5)
CHLORIDE SERPL-SCNC: 111 MMOL/L (ref 95–110)
CO2 SERPL-SCNC: 26 MMOL/L (ref 23–29)
CREAT SERPL-MCNC: 0.8 MG/DL (ref 0.5–1.4)
CRP SERPL-MCNC: 6.5 MG/L (ref 0–8.2)
D DIMER PPP IA.FEU-MCNC: 0.21 MG/L FEU
DIFFERENTIAL METHOD: ABNORMAL
EOSINOPHIL # BLD AUTO: 0 K/UL (ref 0–0.5)
EOSINOPHIL NFR BLD: 0 % (ref 0–8)
ERYTHROCYTE [DISTWIDTH] IN BLOOD BY AUTOMATED COUNT: 13.5 % (ref 11.5–14.5)
EST. GFR  (AFRICAN AMERICAN): >60 ML/MIN/1.73 M^2
EST. GFR  (NON AFRICAN AMERICAN): >60 ML/MIN/1.73 M^2
FERRITIN SERPL-MCNC: 325 NG/ML (ref 20–300)
GLUCOSE SERPL-MCNC: 123 MG/DL (ref 70–110)
HCT VFR BLD AUTO: 40 % (ref 40–54)
HGB BLD-MCNC: 13.6 G/DL (ref 14–18)
IMM GRANULOCYTES # BLD AUTO: 0.06 K/UL (ref 0–0.04)
IMM GRANULOCYTES NFR BLD AUTO: 0.7 % (ref 0–0.5)
INR PPP: 1.7 (ref 0.8–1.2)
LYMPHOCYTES # BLD AUTO: 1.3 K/UL (ref 1–4.8)
LYMPHOCYTES NFR BLD: 15.1 % (ref 18–48)
MAGNESIUM SERPL-MCNC: 2.1 MG/DL (ref 1.6–2.6)
MCH RBC QN AUTO: 28.7 PG (ref 27–31)
MCHC RBC AUTO-ENTMCNC: 34 G/DL (ref 32–36)
MCV RBC AUTO: 84 FL (ref 82–98)
MONOCYTES # BLD AUTO: 1 K/UL (ref 0.3–1)
MONOCYTES NFR BLD: 11.7 % (ref 4–15)
NEUTROPHILS # BLD AUTO: 6.4 K/UL (ref 1.8–7.7)
NEUTROPHILS NFR BLD: 72.4 % (ref 38–73)
NRBC BLD-RTO: 0 /100 WBC
PLATELET # BLD AUTO: 342 K/UL (ref 150–350)
PMV BLD AUTO: 11 FL (ref 9.2–12.9)
POCT GLUCOSE: 114 MG/DL (ref 70–110)
POCT GLUCOSE: 137 MG/DL (ref 70–110)
POCT GLUCOSE: 152 MG/DL (ref 70–110)
POCT GLUCOSE: 88 MG/DL (ref 70–110)
POTASSIUM SERPL-SCNC: 4 MMOL/L (ref 3.5–5.1)
PROT SERPL-MCNC: 6.6 G/DL (ref 6–8.4)
PROTHROMBIN TIME: 17.4 SEC (ref 9–12.5)
RBC # BLD AUTO: 4.74 M/UL (ref 4.6–6.2)
SODIUM SERPL-SCNC: 147 MMOL/L (ref 136–145)
WBC # BLD AUTO: 8.78 K/UL (ref 3.9–12.7)

## 2021-02-03 PROCEDURE — 83735 ASSAY OF MAGNESIUM: CPT

## 2021-02-03 PROCEDURE — 97110 THERAPEUTIC EXERCISES: CPT

## 2021-02-03 PROCEDURE — 25000003 PHARM REV CODE 250: Performed by: HOSPITALIST

## 2021-02-03 PROCEDURE — 86140 C-REACTIVE PROTEIN: CPT

## 2021-02-03 PROCEDURE — 85025 COMPLETE CBC W/AUTO DIFF WBC: CPT

## 2021-02-03 PROCEDURE — 80053 COMPREHEN METABOLIC PANEL: CPT

## 2021-02-03 PROCEDURE — 82728 ASSAY OF FERRITIN: CPT

## 2021-02-03 PROCEDURE — 36415 COLL VENOUS BLD VENIPUNCTURE: CPT

## 2021-02-03 PROCEDURE — 25000003 PHARM REV CODE 250: Performed by: STUDENT IN AN ORGANIZED HEALTH CARE EDUCATION/TRAINING PROGRAM

## 2021-02-03 PROCEDURE — 85379 FIBRIN DEGRADATION QUANT: CPT

## 2021-02-03 PROCEDURE — 25000242 PHARM REV CODE 250 ALT 637 W/ HCPCS: Performed by: HOSPITALIST

## 2021-02-03 PROCEDURE — 11000001 HC ACUTE MED/SURG PRIVATE ROOM

## 2021-02-03 PROCEDURE — 94799 UNLISTED PULMONARY SVC/PX: CPT

## 2021-02-03 PROCEDURE — 94761 N-INVAS EAR/PLS OXIMETRY MLT: CPT

## 2021-02-03 PROCEDURE — 99900035 HC TECH TIME PER 15 MIN (STAT)

## 2021-02-03 PROCEDURE — 94664 DEMO&/EVAL PT USE INHALER: CPT

## 2021-02-03 PROCEDURE — 25000242 PHARM REV CODE 250 ALT 637 W/ HCPCS: Performed by: INTERNAL MEDICINE

## 2021-02-03 PROCEDURE — 85610 PROTHROMBIN TIME: CPT

## 2021-02-03 PROCEDURE — 94640 AIRWAY INHALATION TREATMENT: CPT

## 2021-02-03 PROCEDURE — 97161 PT EVAL LOW COMPLEX 20 MIN: CPT

## 2021-02-03 PROCEDURE — 63600175 PHARM REV CODE 636 W HCPCS: Performed by: HOSPITALIST

## 2021-02-03 PROCEDURE — 93793 PR ANTICOAGULANT MGMT FOR PT TAKING WARFARIN: ICD-10-PCS | Mod: S$GLB,,,

## 2021-02-03 PROCEDURE — 93793 ANTICOAG MGMT PT WARFARIN: CPT | Mod: S$GLB,,,

## 2021-02-03 PROCEDURE — 97535 SELF CARE MNGMENT TRAINING: CPT

## 2021-02-03 RX ORDER — WARFARIN 3 MG/1
3 TABLET ORAL DAILY
Status: DISCONTINUED | OUTPATIENT
Start: 2021-02-03 | End: 2021-02-04 | Stop reason: HOSPADM

## 2021-02-03 RX ADMIN — WARFARIN SODIUM 3 MG: 3 TABLET ORAL at 05:02

## 2021-02-03 RX ADMIN — GUAIFENESIN AND DEXTROMETHORPHAN 5 ML: 100; 10 SYRUP ORAL at 12:02

## 2021-02-03 RX ADMIN — GUAIFENESIN AND DEXTROMETHORPHAN 5 ML: 100; 10 SYRUP ORAL at 05:02

## 2021-02-03 RX ADMIN — OXYCODONE HYDROCHLORIDE AND ACETAMINOPHEN 500 MG: 500 TABLET ORAL at 09:02

## 2021-02-03 RX ADMIN — THERA TABS 1 TABLET: TAB at 09:02

## 2021-02-03 RX ADMIN — ENOXAPARIN SODIUM 70 MG: 80 INJECTION SUBCUTANEOUS at 09:02

## 2021-02-03 RX ADMIN — TIOTROPIUM BROMIDE INHALATION SPRAY 2 PUFF: 3.12 SPRAY, METERED RESPIRATORY (INHALATION) at 08:02

## 2021-02-03 RX ADMIN — CHOLECALCIFEROL TAB 25 MCG (1000 UNIT) 1000 UNITS: 25 TAB at 09:02

## 2021-02-03 RX ADMIN — DEXAMETHASONE 6 MG: 4 TABLET ORAL at 09:02

## 2021-02-03 RX ADMIN — REMDESIVIR 100 MG: 100 INJECTION, POWDER, LYOPHILIZED, FOR SOLUTION INTRAVENOUS at 04:02

## 2021-02-04 ENCOUNTER — TELEPHONE (OUTPATIENT)
Dept: ADMINISTRATIVE | Facility: CLINIC | Age: 81
End: 2021-02-04

## 2021-02-04 VITALS
DIASTOLIC BLOOD PRESSURE: 59 MMHG | SYSTOLIC BLOOD PRESSURE: 109 MMHG | BODY MASS INDEX: 23.18 KG/M2 | OXYGEN SATURATION: 95 % | HEIGHT: 69 IN | TEMPERATURE: 97 F | WEIGHT: 156.5 LBS | HEART RATE: 46 BPM | RESPIRATION RATE: 17 BRPM

## 2021-02-04 PROBLEM — G93.40 ACUTE ENCEPHALOPATHY: Status: RESOLVED | Noted: 2021-02-02 | Resolved: 2021-02-04

## 2021-02-04 LAB
ALBUMIN SERPL BCP-MCNC: 3.3 G/DL (ref 3.5–5.2)
ALP SERPL-CCNC: 53 U/L (ref 55–135)
ALT SERPL W/O P-5'-P-CCNC: 41 U/L (ref 10–44)
ANION GAP SERPL CALC-SCNC: 10 MMOL/L (ref 8–16)
AST SERPL-CCNC: 40 U/L (ref 10–40)
BACTERIA BLD CULT: NORMAL
BACTERIA BLD CULT: NORMAL
BASOPHILS # BLD AUTO: 0.01 K/UL (ref 0–0.2)
BASOPHILS NFR BLD: 0.1 % (ref 0–1.9)
BILIRUB SERPL-MCNC: 0.5 MG/DL (ref 0.1–1)
BUN SERPL-MCNC: 12 MG/DL (ref 8–23)
CALCIUM SERPL-MCNC: 8.4 MG/DL (ref 8.7–10.5)
CHLORIDE SERPL-SCNC: 109 MMOL/L (ref 95–110)
CO2 SERPL-SCNC: 24 MMOL/L (ref 23–29)
CREAT SERPL-MCNC: 0.8 MG/DL (ref 0.5–1.4)
DIFFERENTIAL METHOD: ABNORMAL
EOSINOPHIL # BLD AUTO: 0 K/UL (ref 0–0.5)
EOSINOPHIL NFR BLD: 0 % (ref 0–8)
ERYTHROCYTE [DISTWIDTH] IN BLOOD BY AUTOMATED COUNT: 13.4 % (ref 11.5–14.5)
EST. GFR  (AFRICAN AMERICAN): >60 ML/MIN/1.73 M^2
EST. GFR  (NON AFRICAN AMERICAN): >60 ML/MIN/1.73 M^2
GLUCOSE SERPL-MCNC: 98 MG/DL (ref 70–110)
HCT VFR BLD AUTO: 41.7 % (ref 40–54)
HGB BLD-MCNC: 13.6 G/DL (ref 14–18)
IMM GRANULOCYTES # BLD AUTO: 0.06 K/UL (ref 0–0.04)
IMM GRANULOCYTES NFR BLD AUTO: 0.8 % (ref 0–0.5)
INR PPP: 2.4 (ref 0.8–1.2)
LYMPHOCYTES # BLD AUTO: 1.7 K/UL (ref 1–4.8)
LYMPHOCYTES NFR BLD: 23.1 % (ref 18–48)
MAGNESIUM SERPL-MCNC: 2 MG/DL (ref 1.6–2.6)
MCH RBC QN AUTO: 28.2 PG (ref 27–31)
MCHC RBC AUTO-ENTMCNC: 32.6 G/DL (ref 32–36)
MCV RBC AUTO: 86 FL (ref 82–98)
MONOCYTES # BLD AUTO: 1 K/UL (ref 0.3–1)
MONOCYTES NFR BLD: 13.9 % (ref 4–15)
NEUTROPHILS # BLD AUTO: 4.6 K/UL (ref 1.8–7.7)
NEUTROPHILS NFR BLD: 62.1 % (ref 38–73)
NRBC BLD-RTO: 0 /100 WBC
PLATELET # BLD AUTO: 305 K/UL (ref 150–350)
PMV BLD AUTO: 10.5 FL (ref 9.2–12.9)
POCT GLUCOSE: 100 MG/DL (ref 70–110)
POCT GLUCOSE: 89 MG/DL (ref 70–110)
POTASSIUM SERPL-SCNC: 3.8 MMOL/L (ref 3.5–5.1)
PROT SERPL-MCNC: 6.3 G/DL (ref 6–8.4)
PROTHROMBIN TIME: 24.7 SEC (ref 9–12.5)
RBC # BLD AUTO: 4.83 M/UL (ref 4.6–6.2)
SODIUM SERPL-SCNC: 143 MMOL/L (ref 136–145)
WBC # BLD AUTO: 7.48 K/UL (ref 3.9–12.7)

## 2021-02-04 PROCEDURE — 36415 COLL VENOUS BLD VENIPUNCTURE: CPT

## 2021-02-04 PROCEDURE — 25000003 PHARM REV CODE 250: Performed by: STUDENT IN AN ORGANIZED HEALTH CARE EDUCATION/TRAINING PROGRAM

## 2021-02-04 PROCEDURE — 85610 PROTHROMBIN TIME: CPT

## 2021-02-04 PROCEDURE — 25000003 PHARM REV CODE 250: Performed by: HOSPITALIST

## 2021-02-04 PROCEDURE — 83735 ASSAY OF MAGNESIUM: CPT

## 2021-02-04 PROCEDURE — 85025 COMPLETE CBC W/AUTO DIFF WBC: CPT

## 2021-02-04 PROCEDURE — 63600175 PHARM REV CODE 636 W HCPCS: Performed by: HOSPITALIST

## 2021-02-04 PROCEDURE — 25000242 PHARM REV CODE 250 ALT 637 W/ HCPCS: Performed by: HOSPITALIST

## 2021-02-04 PROCEDURE — 80053 COMPREHEN METABOLIC PANEL: CPT

## 2021-02-04 RX ORDER — WARFARIN 3 MG/1
3 TABLET ORAL DAILY
Qty: 30 TABLET | Refills: 6 | Status: SHIPPED | OUTPATIENT
Start: 2021-02-04 | End: 2021-08-04 | Stop reason: SDUPTHER

## 2021-02-04 RX ORDER — BENZONATATE 200 MG/1
200 CAPSULE ORAL 3 TIMES DAILY PRN
Qty: 20 CAPSULE | Refills: 0 | Status: SHIPPED | OUTPATIENT
Start: 2021-02-04 | End: 2021-02-14

## 2021-02-04 RX ADMIN — CHOLECALCIFEROL TAB 25 MCG (1000 UNIT) 1000 UNITS: 25 TAB at 09:02

## 2021-02-04 RX ADMIN — DEXAMETHASONE 4 MG: 4 TABLET ORAL at 09:02

## 2021-02-04 RX ADMIN — GUAIFENESIN AND DEXTROMETHORPHAN 5 ML: 100; 10 SYRUP ORAL at 06:02

## 2021-02-04 RX ADMIN — OXYCODONE HYDROCHLORIDE AND ACETAMINOPHEN 500 MG: 500 TABLET ORAL at 09:02

## 2021-02-04 RX ADMIN — TIOTROPIUM BROMIDE INHALATION SPRAY 2 PUFF: 3.12 SPRAY, METERED RESPIRATORY (INHALATION) at 09:02

## 2021-02-04 RX ADMIN — GUAIFENESIN AND DEXTROMETHORPHAN 5 ML: 100; 10 SYRUP ORAL at 12:02

## 2021-02-04 RX ADMIN — DEXAMETHASONE 2 MG: 4 TABLET ORAL at 09:02

## 2021-02-04 RX ADMIN — THERA TABS 1 TABLET: TAB at 09:02

## 2021-02-05 ENCOUNTER — PATIENT MESSAGE (OUTPATIENT)
Dept: ADMINISTRATIVE | Facility: OTHER | Age: 81
End: 2021-02-05

## 2021-02-05 ENCOUNTER — NURSE TRIAGE (OUTPATIENT)
Dept: ADMINISTRATIVE | Facility: CLINIC | Age: 81
End: 2021-02-05

## 2021-02-05 ENCOUNTER — TELEPHONE (OUTPATIENT)
Dept: ADMINISTRATIVE | Facility: CLINIC | Age: 81
End: 2021-02-05

## 2021-02-05 ENCOUNTER — ANTI-COAG VISIT (OUTPATIENT)
Dept: CARDIOLOGY | Facility: CLINIC | Age: 81
End: 2021-02-05
Payer: MEDICARE

## 2021-02-05 ENCOUNTER — LAB VISIT (OUTPATIENT)
Dept: LAB | Facility: HOSPITAL | Age: 81
End: 2021-02-05
Attending: INTERNAL MEDICINE
Payer: MEDICARE

## 2021-02-05 DIAGNOSIS — I26.99 PULMONARY EMBOLISM, UNSPECIFIED CHRONICITY, UNSPECIFIED PULMONARY EMBOLISM TYPE, UNSPECIFIED WHETHER ACUTE COR PULMONALE PRESENT: Primary | ICD-10-CM

## 2021-02-05 DIAGNOSIS — Z79.01 LONG TERM (CURRENT) USE OF ANTICOAGULANTS: ICD-10-CM

## 2021-02-05 DIAGNOSIS — I26.99 PULMONARY EMBOLISM, UNSPECIFIED CHRONICITY, UNSPECIFIED PULMONARY EMBOLISM TYPE, UNSPECIFIED WHETHER ACUTE COR PULMONALE PRESENT: ICD-10-CM

## 2021-02-05 LAB
INR PPP: 2.2 (ref 0.8–1.2)
PROTHROMBIN TIME: 22 SEC (ref 9–12.5)

## 2021-02-05 PROCEDURE — 93793 PR ANTICOAGULANT MGMT FOR PT TAKING WARFARIN: ICD-10-PCS | Mod: S$GLB,,,

## 2021-02-05 PROCEDURE — 93793 ANTICOAG MGMT PT WARFARIN: CPT | Mod: S$GLB,,,

## 2021-02-05 PROCEDURE — 85610 PROTHROMBIN TIME: CPT

## 2021-02-05 PROCEDURE — 36415 COLL VENOUS BLD VENIPUNCTURE: CPT

## 2021-02-06 ENCOUNTER — PATIENT MESSAGE (OUTPATIENT)
Dept: ADMINISTRATIVE | Facility: CLINIC | Age: 81
End: 2021-02-06

## 2021-02-06 ENCOUNTER — PATIENT MESSAGE (OUTPATIENT)
Dept: ADMINISTRATIVE | Facility: OTHER | Age: 81
End: 2021-02-06

## 2021-02-06 ENCOUNTER — NURSE TRIAGE (OUTPATIENT)
Dept: ADMINISTRATIVE | Facility: CLINIC | Age: 81
End: 2021-02-06

## 2021-02-07 ENCOUNTER — NURSE TRIAGE (OUTPATIENT)
Dept: ADMINISTRATIVE | Facility: CLINIC | Age: 81
End: 2021-02-07

## 2021-02-07 ENCOUNTER — PATIENT MESSAGE (OUTPATIENT)
Dept: ADMINISTRATIVE | Facility: OTHER | Age: 81
End: 2021-02-07

## 2021-02-08 ENCOUNTER — LAB VISIT (OUTPATIENT)
Dept: LAB | Facility: HOSPITAL | Age: 81
End: 2021-02-08
Attending: INTERNAL MEDICINE
Payer: MEDICARE

## 2021-02-08 ENCOUNTER — PATIENT OUTREACH (OUTPATIENT)
Dept: ADMINISTRATIVE | Facility: HOSPITAL | Age: 81
End: 2021-02-08

## 2021-02-08 ENCOUNTER — ANTI-COAG VISIT (OUTPATIENT)
Dept: CARDIOLOGY | Facility: CLINIC | Age: 81
End: 2021-02-08
Payer: MEDICARE

## 2021-02-08 ENCOUNTER — PATIENT MESSAGE (OUTPATIENT)
Dept: ADMINISTRATIVE | Facility: OTHER | Age: 81
End: 2021-02-08

## 2021-02-08 DIAGNOSIS — I26.99 PULMONARY EMBOLISM, UNSPECIFIED CHRONICITY, UNSPECIFIED PULMONARY EMBOLISM TYPE, UNSPECIFIED WHETHER ACUTE COR PULMONALE PRESENT: Primary | ICD-10-CM

## 2021-02-08 DIAGNOSIS — I26.99 PULMONARY EMBOLISM, UNSPECIFIED CHRONICITY, UNSPECIFIED PULMONARY EMBOLISM TYPE, UNSPECIFIED WHETHER ACUTE COR PULMONALE PRESENT: ICD-10-CM

## 2021-02-08 DIAGNOSIS — Z79.01 LONG TERM (CURRENT) USE OF ANTICOAGULANTS: ICD-10-CM

## 2021-02-08 LAB
INR PPP: 2.7 (ref 0.8–1.2)
PROTHROMBIN TIME: 27.5 SEC (ref 9–12.5)

## 2021-02-08 PROCEDURE — 93793 ANTICOAG MGMT PT WARFARIN: CPT | Mod: S$GLB,,,

## 2021-02-08 PROCEDURE — 85610 PROTHROMBIN TIME: CPT

## 2021-02-08 PROCEDURE — 93793 PR ANTICOAGULANT MGMT FOR PT TAKING WARFARIN: ICD-10-PCS | Mod: S$GLB,,,

## 2021-02-08 PROCEDURE — 36415 COLL VENOUS BLD VENIPUNCTURE: CPT

## 2021-02-09 ENCOUNTER — TELEPHONE (OUTPATIENT)
Dept: FAMILY MEDICINE | Facility: CLINIC | Age: 81
End: 2021-02-09

## 2021-02-09 ENCOUNTER — PATIENT MESSAGE (OUTPATIENT)
Dept: ADMINISTRATIVE | Facility: OTHER | Age: 81
End: 2021-02-09

## 2021-02-10 ENCOUNTER — PATIENT MESSAGE (OUTPATIENT)
Dept: ADMINISTRATIVE | Facility: OTHER | Age: 81
End: 2021-02-10

## 2021-02-11 ENCOUNTER — LAB VISIT (OUTPATIENT)
Dept: LAB | Facility: HOSPITAL | Age: 81
End: 2021-02-11
Attending: INTERNAL MEDICINE
Payer: MEDICARE

## 2021-02-11 ENCOUNTER — PATIENT MESSAGE (OUTPATIENT)
Dept: ADMINISTRATIVE | Facility: OTHER | Age: 81
End: 2021-02-11

## 2021-02-11 ENCOUNTER — ANTI-COAG VISIT (OUTPATIENT)
Dept: CARDIOLOGY | Facility: CLINIC | Age: 81
End: 2021-02-11
Payer: MEDICARE

## 2021-02-11 DIAGNOSIS — I26.99 PULMONARY EMBOLISM, UNSPECIFIED CHRONICITY, UNSPECIFIED PULMONARY EMBOLISM TYPE, UNSPECIFIED WHETHER ACUTE COR PULMONALE PRESENT: Primary | ICD-10-CM

## 2021-02-11 DIAGNOSIS — Z79.01 LONG TERM (CURRENT) USE OF ANTICOAGULANTS: ICD-10-CM

## 2021-02-11 DIAGNOSIS — I26.99 PULMONARY EMBOLISM, UNSPECIFIED CHRONICITY, UNSPECIFIED PULMONARY EMBOLISM TYPE, UNSPECIFIED WHETHER ACUTE COR PULMONALE PRESENT: ICD-10-CM

## 2021-02-11 PROBLEM — U07.1 PNEUMONIA DUE TO COVID-19 VIRUS: Status: RESOLVED | Noted: 2021-01-30 | Resolved: 2021-02-11

## 2021-02-11 PROBLEM — R04.2 COUGH WITH HEMOPTYSIS: Status: RESOLVED | Noted: 2018-10-26 | Resolved: 2021-02-11

## 2021-02-11 PROBLEM — J12.82 PNEUMONIA DUE TO COVID-19 VIRUS: Status: RESOLVED | Noted: 2021-01-30 | Resolved: 2021-02-11

## 2021-02-11 LAB
INR PPP: 2.1 (ref 0.8–1.2)
PROTHROMBIN TIME: 21.1 SEC (ref 9–12.5)

## 2021-02-11 PROCEDURE — 85610 PROTHROMBIN TIME: CPT

## 2021-02-11 PROCEDURE — 93793 ANTICOAG MGMT PT WARFARIN: CPT | Mod: S$GLB,,,

## 2021-02-11 PROCEDURE — 93793 PR ANTICOAGULANT MGMT FOR PT TAKING WARFARIN: ICD-10-PCS | Mod: S$GLB,,,

## 2021-02-11 PROCEDURE — 36415 COLL VENOUS BLD VENIPUNCTURE: CPT

## 2021-02-12 ENCOUNTER — PATIENT MESSAGE (OUTPATIENT)
Dept: ADMINISTRATIVE | Facility: OTHER | Age: 81
End: 2021-02-12

## 2021-02-13 ENCOUNTER — PATIENT MESSAGE (OUTPATIENT)
Dept: ADMINISTRATIVE | Facility: OTHER | Age: 81
End: 2021-02-13

## 2021-02-13 ENCOUNTER — NURSE TRIAGE (OUTPATIENT)
Dept: ADMINISTRATIVE | Facility: CLINIC | Age: 81
End: 2021-02-13

## 2021-02-13 DIAGNOSIS — U07.1 COVID-19: Primary | ICD-10-CM

## 2021-02-14 ENCOUNTER — PATIENT MESSAGE (OUTPATIENT)
Dept: ADMINISTRATIVE | Facility: OTHER | Age: 81
End: 2021-02-14

## 2021-02-15 ENCOUNTER — PATIENT MESSAGE (OUTPATIENT)
Dept: ADMINISTRATIVE | Facility: OTHER | Age: 81
End: 2021-02-15

## 2021-02-16 ENCOUNTER — PATIENT MESSAGE (OUTPATIENT)
Dept: ADMINISTRATIVE | Facility: OTHER | Age: 81
End: 2021-02-16

## 2021-02-17 ENCOUNTER — PATIENT MESSAGE (OUTPATIENT)
Dept: ADMINISTRATIVE | Facility: OTHER | Age: 81
End: 2021-02-17

## 2021-02-18 ENCOUNTER — PATIENT MESSAGE (OUTPATIENT)
Dept: ADMINISTRATIVE | Facility: OTHER | Age: 81
End: 2021-02-18

## 2021-02-18 ENCOUNTER — LAB VISIT (OUTPATIENT)
Dept: LAB | Facility: HOSPITAL | Age: 81
End: 2021-02-18
Attending: INTERNAL MEDICINE
Payer: MEDICARE

## 2021-02-18 ENCOUNTER — ANTI-COAG VISIT (OUTPATIENT)
Dept: CARDIOLOGY | Facility: CLINIC | Age: 81
End: 2021-02-18
Payer: MEDICARE

## 2021-02-18 DIAGNOSIS — Z79.01 LONG TERM (CURRENT) USE OF ANTICOAGULANTS: ICD-10-CM

## 2021-02-18 DIAGNOSIS — Z79.01 LONG TERM (CURRENT) USE OF ANTICOAGULANTS: Primary | ICD-10-CM

## 2021-02-18 DIAGNOSIS — I26.99 PULMONARY EMBOLISM, UNSPECIFIED CHRONICITY, UNSPECIFIED PULMONARY EMBOLISM TYPE, UNSPECIFIED WHETHER ACUTE COR PULMONALE PRESENT: ICD-10-CM

## 2021-02-18 LAB
INR PPP: 1.5 (ref 0.8–1.2)
PROTHROMBIN TIME: 15.4 SEC (ref 9–12.5)

## 2021-02-18 PROCEDURE — 93793 ANTICOAG MGMT PT WARFARIN: CPT | Mod: S$GLB,,,

## 2021-02-18 PROCEDURE — 93793 PR ANTICOAGULANT MGMT FOR PT TAKING WARFARIN: ICD-10-PCS | Mod: S$GLB,,,

## 2021-02-18 PROCEDURE — 36415 COLL VENOUS BLD VENIPUNCTURE: CPT

## 2021-02-18 PROCEDURE — 85610 PROTHROMBIN TIME: CPT

## 2021-02-19 ENCOUNTER — PATIENT MESSAGE (OUTPATIENT)
Dept: ADMINISTRATIVE | Facility: OTHER | Age: 81
End: 2021-02-19

## 2021-02-19 ENCOUNTER — OFFICE VISIT (OUTPATIENT)
Dept: NEUROLOGY | Facility: CLINIC | Age: 81
End: 2021-02-19
Payer: MEDICARE

## 2021-02-19 VITALS
SYSTOLIC BLOOD PRESSURE: 119 MMHG | DIASTOLIC BLOOD PRESSURE: 65 MMHG | HEIGHT: 69 IN | BODY MASS INDEX: 24.03 KG/M2 | HEART RATE: 59 BPM | WEIGHT: 162.25 LBS

## 2021-02-19 DIAGNOSIS — R41.841 COGNITIVE COMMUNICATION DEFICIT: Primary | ICD-10-CM

## 2021-02-19 DIAGNOSIS — G31.84 MILD COGNITIVE IMPAIRMENT: ICD-10-CM

## 2021-02-19 PROCEDURE — 1126F PR PAIN SEVERITY QUANTIFIED, NO PAIN PRESENT: ICD-10-PCS | Mod: S$GLB,,, | Performed by: NEUROLOGICAL SURGERY

## 2021-02-19 PROCEDURE — 1101F PR PT FALLS ASSESS DOC 0-1 FALLS W/OUT INJ PAST YR: ICD-10-PCS | Mod: CPTII,S$GLB,, | Performed by: NEUROLOGICAL SURGERY

## 2021-02-19 PROCEDURE — 1157F PR ADVANCE CARE PLAN OR EQUIV PRESENT IN MEDICAL RECORD: ICD-10-PCS | Mod: S$GLB,,, | Performed by: NEUROLOGICAL SURGERY

## 2021-02-19 PROCEDURE — 1126F AMNT PAIN NOTED NONE PRSNT: CPT | Mod: S$GLB,,, | Performed by: NEUROLOGICAL SURGERY

## 2021-02-19 PROCEDURE — 3288F FALL RISK ASSESSMENT DOCD: CPT | Mod: CPTII,S$GLB,, | Performed by: NEUROLOGICAL SURGERY

## 2021-02-19 PROCEDURE — 1159F MED LIST DOCD IN RCRD: CPT | Mod: S$GLB,,, | Performed by: NEUROLOGICAL SURGERY

## 2021-02-19 PROCEDURE — 99999 PR PBB SHADOW E&M-EST. PATIENT-LVL III: CPT | Mod: PBBFAC,,, | Performed by: NEUROLOGICAL SURGERY

## 2021-02-19 PROCEDURE — 3078F PR MOST RECENT DIASTOLIC BLOOD PRESSURE < 80 MM HG: ICD-10-PCS | Mod: CPTII,S$GLB,, | Performed by: NEUROLOGICAL SURGERY

## 2021-02-19 PROCEDURE — 99213 PR OFFICE/OUTPT VISIT, EST, LEVL III, 20-29 MIN: ICD-10-PCS | Mod: S$GLB,,, | Performed by: NEUROLOGICAL SURGERY

## 2021-02-19 PROCEDURE — 3288F PR FALLS RISK ASSESSMENT DOCUMENTED: ICD-10-PCS | Mod: CPTII,S$GLB,, | Performed by: NEUROLOGICAL SURGERY

## 2021-02-19 PROCEDURE — 1159F PR MEDICATION LIST DOCUMENTED IN MEDICAL RECORD: ICD-10-PCS | Mod: S$GLB,,, | Performed by: NEUROLOGICAL SURGERY

## 2021-02-19 PROCEDURE — 99999 PR PBB SHADOW E&M-EST. PATIENT-LVL III: ICD-10-PCS | Mod: PBBFAC,,, | Performed by: NEUROLOGICAL SURGERY

## 2021-02-19 PROCEDURE — 99213 OFFICE O/P EST LOW 20 MIN: CPT | Mod: S$GLB,,, | Performed by: NEUROLOGICAL SURGERY

## 2021-02-19 PROCEDURE — 1157F ADVNC CARE PLAN IN RCRD: CPT | Mod: S$GLB,,, | Performed by: NEUROLOGICAL SURGERY

## 2021-02-19 PROCEDURE — 3074F PR MOST RECENT SYSTOLIC BLOOD PRESSURE < 130 MM HG: ICD-10-PCS | Mod: CPTII,S$GLB,, | Performed by: NEUROLOGICAL SURGERY

## 2021-02-19 PROCEDURE — 3078F DIAST BP <80 MM HG: CPT | Mod: CPTII,S$GLB,, | Performed by: NEUROLOGICAL SURGERY

## 2021-02-19 PROCEDURE — 3074F SYST BP LT 130 MM HG: CPT | Mod: CPTII,S$GLB,, | Performed by: NEUROLOGICAL SURGERY

## 2021-02-19 PROCEDURE — 1101F PT FALLS ASSESS-DOCD LE1/YR: CPT | Mod: CPTII,S$GLB,, | Performed by: NEUROLOGICAL SURGERY

## 2021-02-20 ENCOUNTER — PATIENT MESSAGE (OUTPATIENT)
Dept: ADMINISTRATIVE | Facility: OTHER | Age: 81
End: 2021-02-20

## 2021-02-21 ENCOUNTER — NURSE TRIAGE (OUTPATIENT)
Dept: ADMINISTRATIVE | Facility: CLINIC | Age: 81
End: 2021-02-21

## 2021-02-21 ENCOUNTER — PATIENT MESSAGE (OUTPATIENT)
Dept: ADMINISTRATIVE | Facility: OTHER | Age: 81
End: 2021-02-21

## 2021-02-22 ENCOUNTER — OFFICE VISIT (OUTPATIENT)
Dept: FAMILY MEDICINE | Facility: CLINIC | Age: 81
End: 2021-02-22
Payer: MEDICARE

## 2021-02-22 ENCOUNTER — PATIENT MESSAGE (OUTPATIENT)
Dept: ADMINISTRATIVE | Facility: OTHER | Age: 81
End: 2021-02-22

## 2021-02-22 VITALS
SYSTOLIC BLOOD PRESSURE: 104 MMHG | HEIGHT: 69 IN | OXYGEN SATURATION: 99 % | HEART RATE: 68 BPM | TEMPERATURE: 98 F | DIASTOLIC BLOOD PRESSURE: 64 MMHG | WEIGHT: 163.25 LBS | BODY MASS INDEX: 24.18 KG/M2

## 2021-02-22 DIAGNOSIS — J98.4 CAVITARY LUNG DISEASE: ICD-10-CM

## 2021-02-22 DIAGNOSIS — I70.0 ATHEROSCLEROSIS OF AORTA: ICD-10-CM

## 2021-02-22 DIAGNOSIS — E11.40 CONTROLLED TYPE 2 DIABETES WITH NEUROPATHY: ICD-10-CM

## 2021-02-22 DIAGNOSIS — J43.2 CENTRILOBULAR EMPHYSEMA: ICD-10-CM

## 2021-02-22 DIAGNOSIS — I27.20 MILD PULMONARY HYPERTENSION: ICD-10-CM

## 2021-02-22 DIAGNOSIS — E78.5 HYPERLIPIDEMIA ASSOCIATED WITH TYPE 2 DIABETES MELLITUS: ICD-10-CM

## 2021-02-22 DIAGNOSIS — I26.99 PULMONARY EMBOLISM, UNSPECIFIED CHRONICITY, UNSPECIFIED PULMONARY EMBOLISM TYPE, UNSPECIFIED WHETHER ACUTE COR PULMONALE PRESENT: ICD-10-CM

## 2021-02-22 DIAGNOSIS — G31.84 MILD COGNITIVE IMPAIRMENT: ICD-10-CM

## 2021-02-22 DIAGNOSIS — A31.0 MAI (MYCOBACTERIUM AVIUM-INTRACELLULARE): ICD-10-CM

## 2021-02-22 DIAGNOSIS — Z00.00 ROUTINE MEDICAL EXAM: Primary | ICD-10-CM

## 2021-02-22 DIAGNOSIS — Z79.01 LONG TERM (CURRENT) USE OF ANTICOAGULANTS: ICD-10-CM

## 2021-02-22 DIAGNOSIS — J44.9 CHRONIC OBSTRUCTIVE PULMONARY DISEASE, UNSPECIFIED COPD TYPE: ICD-10-CM

## 2021-02-22 DIAGNOSIS — E11.69 HYPERLIPIDEMIA ASSOCIATED WITH TYPE 2 DIABETES MELLITUS: ICD-10-CM

## 2021-02-22 PROCEDURE — 3078F DIAST BP <80 MM HG: CPT | Mod: CPTII,S$GLB,, | Performed by: INTERNAL MEDICINE

## 2021-02-22 PROCEDURE — 1126F PR PAIN SEVERITY QUANTIFIED, NO PAIN PRESENT: ICD-10-PCS | Mod: S$GLB,,, | Performed by: INTERNAL MEDICINE

## 2021-02-22 PROCEDURE — 1101F PR PT FALLS ASSESS DOC 0-1 FALLS W/OUT INJ PAST YR: ICD-10-PCS | Mod: CPTII,S$GLB,, | Performed by: INTERNAL MEDICINE

## 2021-02-22 PROCEDURE — 1101F PT FALLS ASSESS-DOCD LE1/YR: CPT | Mod: CPTII,S$GLB,, | Performed by: INTERNAL MEDICINE

## 2021-02-22 PROCEDURE — 99397 PR PREVENTIVE VISIT,EST,65 & OVER: ICD-10-PCS | Mod: S$GLB,,, | Performed by: INTERNAL MEDICINE

## 2021-02-22 PROCEDURE — 3078F PR MOST RECENT DIASTOLIC BLOOD PRESSURE < 80 MM HG: ICD-10-PCS | Mod: CPTII,S$GLB,, | Performed by: INTERNAL MEDICINE

## 2021-02-22 PROCEDURE — 99999 PR PBB SHADOW E&M-EST. PATIENT-LVL V: CPT | Mod: PBBFAC,,, | Performed by: INTERNAL MEDICINE

## 2021-02-22 PROCEDURE — 99499 UNLISTED E&M SERVICE: CPT | Mod: S$GLB,,, | Performed by: INTERNAL MEDICINE

## 2021-02-22 PROCEDURE — 3074F SYST BP LT 130 MM HG: CPT | Mod: CPTII,S$GLB,, | Performed by: INTERNAL MEDICINE

## 2021-02-22 PROCEDURE — 1157F ADVNC CARE PLAN IN RCRD: CPT | Mod: S$GLB,,, | Performed by: INTERNAL MEDICINE

## 2021-02-22 PROCEDURE — 99999 PR PBB SHADOW E&M-EST. PATIENT-LVL V: ICD-10-PCS | Mod: PBBFAC,,, | Performed by: INTERNAL MEDICINE

## 2021-02-22 PROCEDURE — 1157F PR ADVANCE CARE PLAN OR EQUIV PRESENT IN MEDICAL RECORD: ICD-10-PCS | Mod: S$GLB,,, | Performed by: INTERNAL MEDICINE

## 2021-02-22 PROCEDURE — 99397 PER PM REEVAL EST PAT 65+ YR: CPT | Mod: S$GLB,,, | Performed by: INTERNAL MEDICINE

## 2021-02-22 PROCEDURE — 1126F AMNT PAIN NOTED NONE PRSNT: CPT | Mod: S$GLB,,, | Performed by: INTERNAL MEDICINE

## 2021-02-22 PROCEDURE — 3074F PR MOST RECENT SYSTOLIC BLOOD PRESSURE < 130 MM HG: ICD-10-PCS | Mod: CPTII,S$GLB,, | Performed by: INTERNAL MEDICINE

## 2021-02-22 PROCEDURE — 3288F FALL RISK ASSESSMENT DOCD: CPT | Mod: CPTII,S$GLB,, | Performed by: INTERNAL MEDICINE

## 2021-02-22 PROCEDURE — 99499 RISK ADDL DX/OHS AUDIT: ICD-10-PCS | Mod: S$GLB,,, | Performed by: INTERNAL MEDICINE

## 2021-02-22 PROCEDURE — 3288F PR FALLS RISK ASSESSMENT DOCUMENTED: ICD-10-PCS | Mod: CPTII,S$GLB,, | Performed by: INTERNAL MEDICINE

## 2021-02-23 ENCOUNTER — PATIENT MESSAGE (OUTPATIENT)
Dept: ADMINISTRATIVE | Facility: OTHER | Age: 81
End: 2021-02-23

## 2021-02-24 ENCOUNTER — PATIENT MESSAGE (OUTPATIENT)
Dept: ADMINISTRATIVE | Facility: OTHER | Age: 81
End: 2021-02-24

## 2021-02-24 ENCOUNTER — NURSE TRIAGE (OUTPATIENT)
Dept: ADMINISTRATIVE | Facility: CLINIC | Age: 81
End: 2021-02-24

## 2021-02-25 ENCOUNTER — LAB VISIT (OUTPATIENT)
Dept: LAB | Facility: HOSPITAL | Age: 81
End: 2021-02-25
Attending: INTERNAL MEDICINE
Payer: MEDICARE

## 2021-02-25 ENCOUNTER — ANTI-COAG VISIT (OUTPATIENT)
Dept: CARDIOLOGY | Facility: CLINIC | Age: 81
End: 2021-02-25
Payer: MEDICARE

## 2021-02-25 DIAGNOSIS — E11.40 CONTROLLED TYPE 2 DIABETES WITH NEUROPATHY: ICD-10-CM

## 2021-02-25 DIAGNOSIS — E11.69 HYPERLIPIDEMIA ASSOCIATED WITH TYPE 2 DIABETES MELLITUS: ICD-10-CM

## 2021-02-25 DIAGNOSIS — E78.5 HYPERLIPIDEMIA ASSOCIATED WITH TYPE 2 DIABETES MELLITUS: ICD-10-CM

## 2021-02-25 DIAGNOSIS — Z79.01 LONG TERM (CURRENT) USE OF ANTICOAGULANTS: ICD-10-CM

## 2021-02-25 DIAGNOSIS — Z79.01 LONG TERM (CURRENT) USE OF ANTICOAGULANTS: Primary | ICD-10-CM

## 2021-02-25 DIAGNOSIS — I26.99 PULMONARY EMBOLISM, UNSPECIFIED CHRONICITY, UNSPECIFIED PULMONARY EMBOLISM TYPE, UNSPECIFIED WHETHER ACUTE COR PULMONALE PRESENT: ICD-10-CM

## 2021-02-25 LAB
CHOLEST SERPL-MCNC: 127 MG/DL (ref 120–199)
CHOLEST/HDLC SERPL: 3.1 {RATIO} (ref 2–5)
HDLC SERPL-MCNC: 41 MG/DL (ref 40–75)
HDLC SERPL: 32.3 % (ref 20–50)
INR PPP: 1.3 (ref 0.8–1.2)
LDLC SERPL CALC-MCNC: 67.4 MG/DL (ref 63–159)
NONHDLC SERPL-MCNC: 86 MG/DL
PROTHROMBIN TIME: 13.9 SEC (ref 9–12.5)
TRIGL SERPL-MCNC: 93 MG/DL (ref 30–150)

## 2021-02-25 PROCEDURE — 36415 COLL VENOUS BLD VENIPUNCTURE: CPT

## 2021-02-25 PROCEDURE — 85610 PROTHROMBIN TIME: CPT

## 2021-02-25 PROCEDURE — 80061 LIPID PANEL: CPT

## 2021-02-25 PROCEDURE — 83036 HEMOGLOBIN GLYCOSYLATED A1C: CPT

## 2021-02-25 PROCEDURE — 93793 PR ANTICOAGULANT MGMT FOR PT TAKING WARFARIN: ICD-10-PCS | Mod: S$GLB,,, | Performed by: PHARMACIST

## 2021-02-25 PROCEDURE — 93793 ANTICOAG MGMT PT WARFARIN: CPT | Mod: S$GLB,,, | Performed by: PHARMACIST

## 2021-02-26 ENCOUNTER — OFFICE VISIT (OUTPATIENT)
Dept: HEMATOLOGY/ONCOLOGY | Facility: CLINIC | Age: 81
End: 2021-02-26
Payer: MEDICARE

## 2021-02-26 ENCOUNTER — PATIENT MESSAGE (OUTPATIENT)
Dept: CARDIOLOGY | Facility: CLINIC | Age: 81
End: 2021-02-26

## 2021-02-26 VITALS
TEMPERATURE: 97 F | OXYGEN SATURATION: 96 % | DIASTOLIC BLOOD PRESSURE: 64 MMHG | HEIGHT: 69 IN | HEART RATE: 77 BPM | BODY MASS INDEX: 23.84 KG/M2 | SYSTOLIC BLOOD PRESSURE: 103 MMHG | WEIGHT: 160.94 LBS

## 2021-02-26 DIAGNOSIS — Z85.46 HISTORY OF PROSTATE CANCER: Primary | ICD-10-CM

## 2021-02-26 DIAGNOSIS — I26.99 PULMONARY EMBOLISM, UNSPECIFIED CHRONICITY, UNSPECIFIED PULMONARY EMBOLISM TYPE, UNSPECIFIED WHETHER ACUTE COR PULMONALE PRESENT: ICD-10-CM

## 2021-02-26 LAB
ESTIMATED AVG GLUCOSE: 117 MG/DL (ref 68–131)
HBA1C MFR BLD: 5.7 % (ref 4–5.6)

## 2021-02-26 PROCEDURE — 99499 UNLISTED E&M SERVICE: CPT | Mod: S$GLB,,, | Performed by: INTERNAL MEDICINE

## 2021-02-26 PROCEDURE — 99205 PR OFFICE/OUTPT VISIT, NEW, LEVL V, 60-74 MIN: ICD-10-PCS | Mod: S$GLB,,, | Performed by: INTERNAL MEDICINE

## 2021-02-26 PROCEDURE — 1101F PT FALLS ASSESS-DOCD LE1/YR: CPT | Mod: CPTII,S$GLB,, | Performed by: INTERNAL MEDICINE

## 2021-02-26 PROCEDURE — 99999 PR PBB SHADOW E&M-EST. PATIENT-LVL III: ICD-10-PCS | Mod: PBBFAC,,, | Performed by: INTERNAL MEDICINE

## 2021-02-26 PROCEDURE — 3288F FALL RISK ASSESSMENT DOCD: CPT | Mod: CPTII,S$GLB,, | Performed by: INTERNAL MEDICINE

## 2021-02-26 PROCEDURE — 99999 PR PBB SHADOW E&M-EST. PATIENT-LVL III: CPT | Mod: PBBFAC,,, | Performed by: INTERNAL MEDICINE

## 2021-02-26 PROCEDURE — 1159F MED LIST DOCD IN RCRD: CPT | Mod: S$GLB,,, | Performed by: INTERNAL MEDICINE

## 2021-02-26 PROCEDURE — 1159F PR MEDICATION LIST DOCUMENTED IN MEDICAL RECORD: ICD-10-PCS | Mod: S$GLB,,, | Performed by: INTERNAL MEDICINE

## 2021-02-26 PROCEDURE — 3074F SYST BP LT 130 MM HG: CPT | Mod: CPTII,S$GLB,, | Performed by: INTERNAL MEDICINE

## 2021-02-26 PROCEDURE — 99205 OFFICE O/P NEW HI 60 MIN: CPT | Mod: S$GLB,,, | Performed by: INTERNAL MEDICINE

## 2021-02-26 PROCEDURE — 1101F PR PT FALLS ASSESS DOC 0-1 FALLS W/OUT INJ PAST YR: ICD-10-PCS | Mod: CPTII,S$GLB,, | Performed by: INTERNAL MEDICINE

## 2021-02-26 PROCEDURE — 3078F DIAST BP <80 MM HG: CPT | Mod: CPTII,S$GLB,, | Performed by: INTERNAL MEDICINE

## 2021-02-26 PROCEDURE — 99499 RISK ADDL DX/OHS AUDIT: ICD-10-PCS | Mod: S$GLB,,, | Performed by: INTERNAL MEDICINE

## 2021-02-26 PROCEDURE — 3288F PR FALLS RISK ASSESSMENT DOCUMENTED: ICD-10-PCS | Mod: CPTII,S$GLB,, | Performed by: INTERNAL MEDICINE

## 2021-02-26 PROCEDURE — 3074F PR MOST RECENT SYSTOLIC BLOOD PRESSURE < 130 MM HG: ICD-10-PCS | Mod: CPTII,S$GLB,, | Performed by: INTERNAL MEDICINE

## 2021-02-26 PROCEDURE — 1126F PR PAIN SEVERITY QUANTIFIED, NO PAIN PRESENT: ICD-10-PCS | Mod: S$GLB,,, | Performed by: INTERNAL MEDICINE

## 2021-02-26 PROCEDURE — 1157F PR ADVANCE CARE PLAN OR EQUIV PRESENT IN MEDICAL RECORD: ICD-10-PCS | Mod: S$GLB,,, | Performed by: INTERNAL MEDICINE

## 2021-02-26 PROCEDURE — 1126F AMNT PAIN NOTED NONE PRSNT: CPT | Mod: S$GLB,,, | Performed by: INTERNAL MEDICINE

## 2021-02-26 PROCEDURE — 1157F ADVNC CARE PLAN IN RCRD: CPT | Mod: S$GLB,,, | Performed by: INTERNAL MEDICINE

## 2021-02-26 PROCEDURE — 3078F PR MOST RECENT DIASTOLIC BLOOD PRESSURE < 80 MM HG: ICD-10-PCS | Mod: CPTII,S$GLB,, | Performed by: INTERNAL MEDICINE

## 2021-02-26 RX ORDER — ENOXAPARIN SODIUM 100 MG/ML
100 INJECTION SUBCUTANEOUS
Qty: 7 SYRINGE | Refills: 0 | Status: SHIPPED | OUTPATIENT
Start: 2021-02-26 | End: 2021-05-05 | Stop reason: ALTCHOICE

## 2021-03-01 ENCOUNTER — ANTI-COAG VISIT (OUTPATIENT)
Dept: CARDIOLOGY | Facility: CLINIC | Age: 81
End: 2021-03-01
Payer: MEDICARE

## 2021-03-01 ENCOUNTER — LAB VISIT (OUTPATIENT)
Dept: LAB | Facility: HOSPITAL | Age: 81
End: 2021-03-01
Attending: INTERNAL MEDICINE
Payer: MEDICARE

## 2021-03-01 DIAGNOSIS — Z79.01 LONG TERM (CURRENT) USE OF ANTICOAGULANTS: ICD-10-CM

## 2021-03-01 DIAGNOSIS — Z79.01 LONG TERM (CURRENT) USE OF ANTICOAGULANTS: Primary | ICD-10-CM

## 2021-03-01 DIAGNOSIS — I26.99 PULMONARY EMBOLISM, UNSPECIFIED CHRONICITY, UNSPECIFIED PULMONARY EMBOLISM TYPE, UNSPECIFIED WHETHER ACUTE COR PULMONALE PRESENT: ICD-10-CM

## 2021-03-01 LAB
ALBUMIN SERPL BCP-MCNC: 4 G/DL (ref 3.5–5.2)
ALP SERPL-CCNC: 64 U/L (ref 55–135)
ALT SERPL W/O P-5'-P-CCNC: 38 U/L (ref 10–44)
ANION GAP SERPL CALC-SCNC: 8 MMOL/L (ref 8–16)
AST SERPL-CCNC: 43 U/L (ref 10–40)
BILIRUB SERPL-MCNC: 0.4 MG/DL (ref 0.1–1)
BUN SERPL-MCNC: 8 MG/DL (ref 8–23)
CALCIUM SERPL-MCNC: 9 MG/DL (ref 8.7–10.5)
CHLORIDE SERPL-SCNC: 104 MMOL/L (ref 95–110)
CO2 SERPL-SCNC: 27 MMOL/L (ref 23–29)
CREAT SERPL-MCNC: 0.9 MG/DL (ref 0.5–1.4)
EST. GFR  (AFRICAN AMERICAN): >60 ML/MIN/1.73 M^2
EST. GFR  (NON AFRICAN AMERICAN): >60 ML/MIN/1.73 M^2
FIBRINOGEN PPP-MCNC: 505 MG/DL (ref 182–366)
GLUCOSE SERPL-MCNC: 125 MG/DL (ref 70–110)
HCYS SERPL-SCNC: 8.1 UMOL/L (ref 4–16.5)
INR PPP: 1.6 (ref 0.8–1.2)
POTASSIUM SERPL-SCNC: 4 MMOL/L (ref 3.5–5.1)
PROT SERPL-MCNC: 7.8 G/DL (ref 6–8.4)
PROTHROMBIN TIME: 16.3 SEC (ref 9–12.5)
SODIUM SERPL-SCNC: 139 MMOL/L (ref 136–145)

## 2021-03-01 PROCEDURE — 80053 COMPREHEN METABOLIC PANEL: CPT

## 2021-03-01 PROCEDURE — 85300 ANTITHROMBIN III ACTIVITY: CPT

## 2021-03-01 PROCEDURE — 83090 ASSAY OF HOMOCYSTEINE: CPT

## 2021-03-01 PROCEDURE — 85384 FIBRINOGEN ACTIVITY: CPT

## 2021-03-01 PROCEDURE — 85610 PROTHROMBIN TIME: CPT

## 2021-03-01 PROCEDURE — 86147 CARDIOLIPIN ANTIBODY EA IG: CPT

## 2021-03-01 PROCEDURE — 93793 PR ANTICOAGULANT MGMT FOR PT TAKING WARFARIN: ICD-10-PCS | Mod: S$GLB,,,

## 2021-03-01 PROCEDURE — 93793 ANTICOAG MGMT PT WARFARIN: CPT | Mod: S$GLB,,,

## 2021-03-04 ENCOUNTER — LAB VISIT (OUTPATIENT)
Dept: LAB | Facility: HOSPITAL | Age: 81
End: 2021-03-04
Attending: INTERNAL MEDICINE
Payer: MEDICARE

## 2021-03-04 ENCOUNTER — ANTI-COAG VISIT (OUTPATIENT)
Dept: CARDIOLOGY | Facility: CLINIC | Age: 81
End: 2021-03-04
Payer: MEDICARE

## 2021-03-04 DIAGNOSIS — I26.99 PULMONARY EMBOLISM, UNSPECIFIED CHRONICITY, UNSPECIFIED PULMONARY EMBOLISM TYPE, UNSPECIFIED WHETHER ACUTE COR PULMONALE PRESENT: ICD-10-CM

## 2021-03-04 DIAGNOSIS — Z79.01 LONG TERM (CURRENT) USE OF ANTICOAGULANTS: ICD-10-CM

## 2021-03-04 DIAGNOSIS — Z79.01 LONG TERM (CURRENT) USE OF ANTICOAGULANTS: Primary | ICD-10-CM

## 2021-03-04 LAB
AT III ACT/NOR PPP CHRO: 101 % (ref 83–118)
CARDIOLIPIN IGG SER IA-ACNC: <9.4 GPL (ref 0–14.99)
CARDIOLIPIN IGM SER IA-ACNC: 17.1 MPL (ref 0–12.49)
INR PPP: 2.1 (ref 0.8–1.2)
PROTHROMBIN TIME: 21.6 SEC (ref 9–12.5)

## 2021-03-04 PROCEDURE — 36415 COLL VENOUS BLD VENIPUNCTURE: CPT | Mod: PO | Performed by: INTERNAL MEDICINE

## 2021-03-04 PROCEDURE — 93793 ANTICOAG MGMT PT WARFARIN: CPT | Mod: S$GLB,,,

## 2021-03-04 PROCEDURE — 85610 PROTHROMBIN TIME: CPT | Performed by: INTERNAL MEDICINE

## 2021-03-04 PROCEDURE — 93793 PR ANTICOAGULANT MGMT FOR PT TAKING WARFARIN: ICD-10-PCS | Mod: S$GLB,,,

## 2021-03-10 ENCOUNTER — HOSPITAL ENCOUNTER (OUTPATIENT)
Dept: RADIOLOGY | Facility: HOSPITAL | Age: 81
Discharge: HOME | End: 2021-03-10
Attending: INTERNAL MEDICINE
Payer: MEDICARE

## 2021-03-10 ENCOUNTER — ANTI-COAG VISIT (OUTPATIENT)
Dept: CARDIOLOGY | Facility: CLINIC | Age: 81
End: 2021-03-10
Payer: MEDICARE

## 2021-03-10 DIAGNOSIS — I26.99 PULMONARY EMBOLISM, UNSPECIFIED CHRONICITY, UNSPECIFIED PULMONARY EMBOLISM TYPE, UNSPECIFIED WHETHER ACUTE COR PULMONALE PRESENT: ICD-10-CM

## 2021-03-10 DIAGNOSIS — Z79.01 LONG TERM (CURRENT) USE OF ANTICOAGULANTS: Primary | ICD-10-CM

## 2021-03-10 PROCEDURE — 76700 US EXAM ABDOM COMPLETE: CPT | Mod: TC

## 2021-03-10 PROCEDURE — 76700 US ABDOMEN COMPLETE: ICD-10-PCS | Mod: 26,,, | Performed by: RADIOLOGY

## 2021-03-10 PROCEDURE — 93793 ANTICOAG MGMT PT WARFARIN: CPT | Mod: S$GLB,,,

## 2021-03-10 PROCEDURE — 93793 PR ANTICOAGULANT MGMT FOR PT TAKING WARFARIN: ICD-10-PCS | Mod: S$GLB,,,

## 2021-03-10 PROCEDURE — 76700 US EXAM ABDOM COMPLETE: CPT | Mod: 26,,, | Performed by: RADIOLOGY

## 2021-03-12 ENCOUNTER — OFFICE VISIT (OUTPATIENT)
Dept: HEMATOLOGY/ONCOLOGY | Facility: CLINIC | Age: 81
End: 2021-03-12
Payer: MEDICARE

## 2021-03-12 VITALS
OXYGEN SATURATION: 98 % | BODY MASS INDEX: 24.59 KG/M2 | HEIGHT: 69 IN | DIASTOLIC BLOOD PRESSURE: 69 MMHG | WEIGHT: 166 LBS | SYSTOLIC BLOOD PRESSURE: 118 MMHG | TEMPERATURE: 98 F | HEART RATE: 97 BPM

## 2021-03-12 DIAGNOSIS — I26.99 PULMONARY EMBOLISM, UNSPECIFIED CHRONICITY, UNSPECIFIED PULMONARY EMBOLISM TYPE, UNSPECIFIED WHETHER ACUTE COR PULMONALE PRESENT: Primary | ICD-10-CM

## 2021-03-12 DIAGNOSIS — Z85.46 HISTORY OF PROSTATE CANCER: ICD-10-CM

## 2021-03-12 PROCEDURE — 99499 UNLISTED E&M SERVICE: CPT | Mod: S$GLB,,, | Performed by: INTERNAL MEDICINE

## 2021-03-12 PROCEDURE — 1126F PR PAIN SEVERITY QUANTIFIED, NO PAIN PRESENT: ICD-10-PCS | Mod: S$GLB,,, | Performed by: INTERNAL MEDICINE

## 2021-03-12 PROCEDURE — 3078F DIAST BP <80 MM HG: CPT | Mod: CPTII,S$GLB,, | Performed by: INTERNAL MEDICINE

## 2021-03-12 PROCEDURE — 1157F ADVNC CARE PLAN IN RCRD: CPT | Mod: S$GLB,,, | Performed by: INTERNAL MEDICINE

## 2021-03-12 PROCEDURE — 1159F PR MEDICATION LIST DOCUMENTED IN MEDICAL RECORD: ICD-10-PCS | Mod: S$GLB,,, | Performed by: INTERNAL MEDICINE

## 2021-03-12 PROCEDURE — 3074F PR MOST RECENT SYSTOLIC BLOOD PRESSURE < 130 MM HG: ICD-10-PCS | Mod: CPTII,S$GLB,, | Performed by: INTERNAL MEDICINE

## 2021-03-12 PROCEDURE — 3074F SYST BP LT 130 MM HG: CPT | Mod: CPTII,S$GLB,, | Performed by: INTERNAL MEDICINE

## 2021-03-12 PROCEDURE — 3078F PR MOST RECENT DIASTOLIC BLOOD PRESSURE < 80 MM HG: ICD-10-PCS | Mod: CPTII,S$GLB,, | Performed by: INTERNAL MEDICINE

## 2021-03-12 PROCEDURE — 3288F FALL RISK ASSESSMENT DOCD: CPT | Mod: CPTII,S$GLB,, | Performed by: INTERNAL MEDICINE

## 2021-03-12 PROCEDURE — 1159F MED LIST DOCD IN RCRD: CPT | Mod: S$GLB,,, | Performed by: INTERNAL MEDICINE

## 2021-03-12 PROCEDURE — 1101F PR PT FALLS ASSESS DOC 0-1 FALLS W/OUT INJ PAST YR: ICD-10-PCS | Mod: CPTII,S$GLB,, | Performed by: INTERNAL MEDICINE

## 2021-03-12 PROCEDURE — 99999 PR PBB SHADOW E&M-EST. PATIENT-LVL IV: CPT | Mod: PBBFAC,,, | Performed by: INTERNAL MEDICINE

## 2021-03-12 PROCEDURE — 1126F AMNT PAIN NOTED NONE PRSNT: CPT | Mod: S$GLB,,, | Performed by: INTERNAL MEDICINE

## 2021-03-12 PROCEDURE — 99213 OFFICE O/P EST LOW 20 MIN: CPT | Mod: S$GLB,,, | Performed by: INTERNAL MEDICINE

## 2021-03-12 PROCEDURE — 1101F PT FALLS ASSESS-DOCD LE1/YR: CPT | Mod: CPTII,S$GLB,, | Performed by: INTERNAL MEDICINE

## 2021-03-12 PROCEDURE — 1157F PR ADVANCE CARE PLAN OR EQUIV PRESENT IN MEDICAL RECORD: ICD-10-PCS | Mod: S$GLB,,, | Performed by: INTERNAL MEDICINE

## 2021-03-12 PROCEDURE — 99999 PR PBB SHADOW E&M-EST. PATIENT-LVL IV: ICD-10-PCS | Mod: PBBFAC,,, | Performed by: INTERNAL MEDICINE

## 2021-03-12 PROCEDURE — 99213 PR OFFICE/OUTPT VISIT, EST, LEVL III, 20-29 MIN: ICD-10-PCS | Mod: S$GLB,,, | Performed by: INTERNAL MEDICINE

## 2021-03-12 PROCEDURE — 3288F PR FALLS RISK ASSESSMENT DOCUMENTED: ICD-10-PCS | Mod: CPTII,S$GLB,, | Performed by: INTERNAL MEDICINE

## 2021-03-12 PROCEDURE — 99499 RISK ADDL DX/OHS AUDIT: ICD-10-PCS | Mod: S$GLB,,, | Performed by: INTERNAL MEDICINE

## 2021-03-17 ENCOUNTER — ANTI-COAG VISIT (OUTPATIENT)
Dept: CARDIOLOGY | Facility: CLINIC | Age: 81
End: 2021-03-17
Payer: MEDICARE

## 2021-03-17 ENCOUNTER — LAB VISIT (OUTPATIENT)
Dept: LAB | Facility: HOSPITAL | Age: 81
End: 2021-03-17
Attending: INTERNAL MEDICINE
Payer: MEDICARE

## 2021-03-17 DIAGNOSIS — Z79.01 LONG TERM (CURRENT) USE OF ANTICOAGULANTS: ICD-10-CM

## 2021-03-17 DIAGNOSIS — I26.99 PULMONARY EMBOLISM, UNSPECIFIED CHRONICITY, UNSPECIFIED PULMONARY EMBOLISM TYPE, UNSPECIFIED WHETHER ACUTE COR PULMONALE PRESENT: ICD-10-CM

## 2021-03-17 DIAGNOSIS — Z79.01 LONG TERM (CURRENT) USE OF ANTICOAGULANTS: Primary | ICD-10-CM

## 2021-03-17 LAB
INR PPP: 1.7 (ref 0.8–1.2)
PROTHROMBIN TIME: 17.8 SEC (ref 9–12.5)

## 2021-03-17 PROCEDURE — 85610 PROTHROMBIN TIME: CPT | Performed by: INTERNAL MEDICINE

## 2021-03-17 PROCEDURE — 93793 PR ANTICOAGULANT MGMT FOR PT TAKING WARFARIN: ICD-10-PCS | Mod: S$GLB,,,

## 2021-03-17 PROCEDURE — 93793 ANTICOAG MGMT PT WARFARIN: CPT | Mod: S$GLB,,,

## 2021-03-17 PROCEDURE — 36415 COLL VENOUS BLD VENIPUNCTURE: CPT | Mod: PO | Performed by: INTERNAL MEDICINE

## 2021-03-24 ENCOUNTER — LAB VISIT (OUTPATIENT)
Dept: LAB | Facility: HOSPITAL | Age: 81
End: 2021-03-24
Attending: INTERNAL MEDICINE
Payer: MEDICARE

## 2021-03-24 ENCOUNTER — ANTI-COAG VISIT (OUTPATIENT)
Dept: CARDIOLOGY | Facility: CLINIC | Age: 81
End: 2021-03-24
Payer: MEDICARE

## 2021-03-24 DIAGNOSIS — I26.99 PULMONARY EMBOLISM, UNSPECIFIED CHRONICITY, UNSPECIFIED PULMONARY EMBOLISM TYPE, UNSPECIFIED WHETHER ACUTE COR PULMONALE PRESENT: ICD-10-CM

## 2021-03-24 DIAGNOSIS — Z79.01 LONG TERM (CURRENT) USE OF ANTICOAGULANTS: ICD-10-CM

## 2021-03-24 DIAGNOSIS — Z79.01 LONG TERM (CURRENT) USE OF ANTICOAGULANTS: Primary | ICD-10-CM

## 2021-03-24 LAB
INR PPP: 2.3 (ref 0.8–1.2)
PROTHROMBIN TIME: 23.7 SEC (ref 9–12.5)

## 2021-03-24 PROCEDURE — 93793 PR ANTICOAGULANT MGMT FOR PT TAKING WARFARIN: ICD-10-PCS | Mod: S$GLB,,,

## 2021-03-24 PROCEDURE — 93793 ANTICOAG MGMT PT WARFARIN: CPT | Mod: S$GLB,,,

## 2021-03-24 PROCEDURE — 36415 COLL VENOUS BLD VENIPUNCTURE: CPT | Mod: PO | Performed by: INTERNAL MEDICINE

## 2021-03-24 PROCEDURE — 85610 PROTHROMBIN TIME: CPT | Performed by: INTERNAL MEDICINE

## 2021-03-31 ENCOUNTER — LAB VISIT (OUTPATIENT)
Dept: LAB | Facility: HOSPITAL | Age: 81
End: 2021-03-31
Attending: INTERNAL MEDICINE
Payer: MEDICARE

## 2021-03-31 ENCOUNTER — ANTI-COAG VISIT (OUTPATIENT)
Dept: CARDIOLOGY | Facility: CLINIC | Age: 81
End: 2021-03-31
Payer: MEDICARE

## 2021-03-31 DIAGNOSIS — Z79.01 LONG TERM (CURRENT) USE OF ANTICOAGULANTS: ICD-10-CM

## 2021-03-31 DIAGNOSIS — I26.99 PULMONARY EMBOLISM, UNSPECIFIED CHRONICITY, UNSPECIFIED PULMONARY EMBOLISM TYPE, UNSPECIFIED WHETHER ACUTE COR PULMONALE PRESENT: ICD-10-CM

## 2021-03-31 DIAGNOSIS — Z79.01 LONG TERM (CURRENT) USE OF ANTICOAGULANTS: Primary | ICD-10-CM

## 2021-03-31 LAB
INR PPP: 2.5 (ref 0.8–1.2)
PROTHROMBIN TIME: 25.2 SEC (ref 9–12.5)

## 2021-03-31 PROCEDURE — 85610 PROTHROMBIN TIME: CPT | Performed by: INTERNAL MEDICINE

## 2021-03-31 PROCEDURE — 93793 PR ANTICOAGULANT MGMT FOR PT TAKING WARFARIN: ICD-10-PCS | Mod: S$GLB,,,

## 2021-03-31 PROCEDURE — 93793 ANTICOAG MGMT PT WARFARIN: CPT | Mod: S$GLB,,,

## 2021-03-31 PROCEDURE — 36415 COLL VENOUS BLD VENIPUNCTURE: CPT | Mod: PO | Performed by: INTERNAL MEDICINE

## 2021-04-14 ENCOUNTER — LAB VISIT (OUTPATIENT)
Dept: LAB | Facility: HOSPITAL | Age: 81
End: 2021-04-14
Attending: INTERNAL MEDICINE
Payer: MEDICARE

## 2021-04-14 ENCOUNTER — ANTI-COAG VISIT (OUTPATIENT)
Dept: CARDIOLOGY | Facility: CLINIC | Age: 81
End: 2021-04-14
Payer: MEDICARE

## 2021-04-14 DIAGNOSIS — Z79.01 LONG TERM (CURRENT) USE OF ANTICOAGULANTS: Primary | ICD-10-CM

## 2021-04-14 DIAGNOSIS — I26.99 PULMONARY EMBOLISM, UNSPECIFIED CHRONICITY, UNSPECIFIED PULMONARY EMBOLISM TYPE, UNSPECIFIED WHETHER ACUTE COR PULMONALE PRESENT: ICD-10-CM

## 2021-04-14 DIAGNOSIS — Z79.01 LONG TERM (CURRENT) USE OF ANTICOAGULANTS: ICD-10-CM

## 2021-04-14 LAB
INR PPP: 2.8 (ref 0.8–1.2)
PROTHROMBIN TIME: 28.3 SEC (ref 9–12.5)

## 2021-04-14 PROCEDURE — 36415 COLL VENOUS BLD VENIPUNCTURE: CPT | Mod: PO | Performed by: INTERNAL MEDICINE

## 2021-04-14 PROCEDURE — 85610 PROTHROMBIN TIME: CPT | Performed by: INTERNAL MEDICINE

## 2021-04-14 PROCEDURE — 93793 ANTICOAG MGMT PT WARFARIN: CPT | Mod: S$GLB,,,

## 2021-04-14 PROCEDURE — 93793 PR ANTICOAGULANT MGMT FOR PT TAKING WARFARIN: ICD-10-PCS | Mod: S$GLB,,,

## 2021-04-19 ENCOUNTER — OFFICE VISIT (OUTPATIENT)
Dept: PODIATRY | Facility: CLINIC | Age: 81
End: 2021-04-19
Payer: MEDICARE

## 2021-04-19 VITALS
HEIGHT: 69 IN | HEART RATE: 73 BPM | BODY MASS INDEX: 24.59 KG/M2 | SYSTOLIC BLOOD PRESSURE: 121 MMHG | WEIGHT: 166 LBS | DIASTOLIC BLOOD PRESSURE: 72 MMHG

## 2021-04-19 DIAGNOSIS — L84 CORN OR CALLUS: ICD-10-CM

## 2021-04-19 DIAGNOSIS — Z87.2 HEALED ULCER OF FOOT ON EXAMINATION: Primary | ICD-10-CM

## 2021-04-19 PROCEDURE — 99999 PR PBB SHADOW E&M-EST. PATIENT-LVL III: CPT | Mod: PBBFAC,,, | Performed by: PODIATRIST

## 2021-04-19 PROCEDURE — 1101F PT FALLS ASSESS-DOCD LE1/YR: CPT | Mod: CPTII,S$GLB,, | Performed by: PODIATRIST

## 2021-04-19 PROCEDURE — 1126F PR PAIN SEVERITY QUANTIFIED, NO PAIN PRESENT: ICD-10-PCS | Mod: S$GLB,,, | Performed by: PODIATRIST

## 2021-04-19 PROCEDURE — 1101F PR PT FALLS ASSESS DOC 0-1 FALLS W/OUT INJ PAST YR: ICD-10-PCS | Mod: CPTII,S$GLB,, | Performed by: PODIATRIST

## 2021-04-19 PROCEDURE — 3288F PR FALLS RISK ASSESSMENT DOCUMENTED: ICD-10-PCS | Mod: CPTII,S$GLB,, | Performed by: PODIATRIST

## 2021-04-19 PROCEDURE — 99999 PR PBB SHADOW E&M-EST. PATIENT-LVL III: ICD-10-PCS | Mod: PBBFAC,,, | Performed by: PODIATRIST

## 2021-04-19 PROCEDURE — 99203 PR OFFICE/OUTPT VISIT, NEW, LEVL III, 30-44 MIN: ICD-10-PCS | Mod: S$GLB,,, | Performed by: PODIATRIST

## 2021-04-19 PROCEDURE — 1157F PR ADVANCE CARE PLAN OR EQUIV PRESENT IN MEDICAL RECORD: ICD-10-PCS | Mod: S$GLB,,, | Performed by: PODIATRIST

## 2021-04-19 PROCEDURE — 99203 OFFICE O/P NEW LOW 30 MIN: CPT | Mod: S$GLB,,, | Performed by: PODIATRIST

## 2021-04-19 PROCEDURE — 1159F PR MEDICATION LIST DOCUMENTED IN MEDICAL RECORD: ICD-10-PCS | Mod: S$GLB,,, | Performed by: PODIATRIST

## 2021-04-19 PROCEDURE — 1157F ADVNC CARE PLAN IN RCRD: CPT | Mod: S$GLB,,, | Performed by: PODIATRIST

## 2021-04-19 PROCEDURE — 1159F MED LIST DOCD IN RCRD: CPT | Mod: S$GLB,,, | Performed by: PODIATRIST

## 2021-04-19 PROCEDURE — 1126F AMNT PAIN NOTED NONE PRSNT: CPT | Mod: S$GLB,,, | Performed by: PODIATRIST

## 2021-04-19 PROCEDURE — 3288F FALL RISK ASSESSMENT DOCD: CPT | Mod: CPTII,S$GLB,, | Performed by: PODIATRIST

## 2021-04-28 ENCOUNTER — IMMUNIZATION (OUTPATIENT)
Dept: OBSTETRICS AND GYNECOLOGY | Facility: CLINIC | Age: 81
End: 2021-04-28
Payer: MEDICARE

## 2021-04-28 DIAGNOSIS — Z23 NEED FOR VACCINATION: Primary | ICD-10-CM

## 2021-04-28 PROCEDURE — 91300 COVID-19, MRNA, LNP-S, PF, 30 MCG/0.3 ML DOSE VACCINE: ICD-10-PCS | Mod: ,,, | Performed by: FAMILY MEDICINE

## 2021-04-28 PROCEDURE — 0002A COVID-19, MRNA, LNP-S, PF, 30 MCG/0.3 ML DOSE VACCINE: ICD-10-PCS | Mod: CV19,,, | Performed by: FAMILY MEDICINE

## 2021-04-28 PROCEDURE — 0002A COVID-19, MRNA, LNP-S, PF, 30 MCG/0.3 ML DOSE VACCINE: CPT | Mod: CV19,,, | Performed by: FAMILY MEDICINE

## 2021-04-28 PROCEDURE — 91300 COVID-19, MRNA, LNP-S, PF, 30 MCG/0.3 ML DOSE VACCINE: CPT | Mod: ,,, | Performed by: FAMILY MEDICINE

## 2021-05-02 DIAGNOSIS — E78.5 HYPERLIPIDEMIA LDL GOAL <100: ICD-10-CM

## 2021-05-03 RX ORDER — PRAVASTATIN SODIUM 10 MG/1
TABLET ORAL
Qty: 90 TABLET | Refills: 1 | Status: SHIPPED | OUTPATIENT
Start: 2021-05-03 | End: 2021-11-08

## 2021-05-05 ENCOUNTER — ANTI-COAG VISIT (OUTPATIENT)
Dept: CARDIOLOGY | Facility: CLINIC | Age: 81
End: 2021-05-05
Payer: MEDICARE

## 2021-05-05 ENCOUNTER — LAB VISIT (OUTPATIENT)
Dept: LAB | Facility: HOSPITAL | Age: 81
End: 2021-05-05
Attending: INTERNAL MEDICINE
Payer: MEDICARE

## 2021-05-05 DIAGNOSIS — Z79.01 LONG TERM (CURRENT) USE OF ANTICOAGULANTS: ICD-10-CM

## 2021-05-05 DIAGNOSIS — I26.99 PULMONARY EMBOLISM, UNSPECIFIED CHRONICITY, UNSPECIFIED PULMONARY EMBOLISM TYPE, UNSPECIFIED WHETHER ACUTE COR PULMONALE PRESENT: ICD-10-CM

## 2021-05-05 DIAGNOSIS — Z79.01 LONG TERM (CURRENT) USE OF ANTICOAGULANTS: Primary | ICD-10-CM

## 2021-05-05 LAB
INR PPP: 2.1 (ref 0.8–1.2)
PROTHROMBIN TIME: 21.8 SEC (ref 9–12.5)

## 2021-05-05 PROCEDURE — 93793 PR ANTICOAGULANT MGMT FOR PT TAKING WARFARIN: ICD-10-PCS | Mod: S$GLB,,,

## 2021-05-05 PROCEDURE — 93793 ANTICOAG MGMT PT WARFARIN: CPT | Mod: S$GLB,,,

## 2021-05-05 PROCEDURE — 36415 COLL VENOUS BLD VENIPUNCTURE: CPT | Mod: PO | Performed by: INTERNAL MEDICINE

## 2021-05-05 PROCEDURE — 85610 PROTHROMBIN TIME: CPT | Performed by: INTERNAL MEDICINE

## 2021-05-26 ENCOUNTER — ANTI-COAG VISIT (OUTPATIENT)
Dept: CARDIOLOGY | Facility: CLINIC | Age: 81
End: 2021-05-26
Payer: MEDICARE

## 2021-05-26 ENCOUNTER — LAB VISIT (OUTPATIENT)
Dept: LAB | Facility: HOSPITAL | Age: 81
End: 2021-05-26
Attending: INTERNAL MEDICINE
Payer: MEDICARE

## 2021-05-26 DIAGNOSIS — Z79.01 LONG TERM (CURRENT) USE OF ANTICOAGULANTS: ICD-10-CM

## 2021-05-26 DIAGNOSIS — I26.99 PULMONARY EMBOLISM, UNSPECIFIED CHRONICITY, UNSPECIFIED PULMONARY EMBOLISM TYPE, UNSPECIFIED WHETHER ACUTE COR PULMONALE PRESENT: ICD-10-CM

## 2021-05-26 DIAGNOSIS — Z79.01 LONG TERM (CURRENT) USE OF ANTICOAGULANTS: Primary | ICD-10-CM

## 2021-05-26 LAB
INR PPP: 3 (ref 0.8–1.2)
PROTHROMBIN TIME: 30 SEC (ref 9–12.5)

## 2021-05-26 PROCEDURE — 93793 ANTICOAG MGMT PT WARFARIN: CPT | Mod: S$GLB,,,

## 2021-05-26 PROCEDURE — 85610 PROTHROMBIN TIME: CPT | Performed by: INTERNAL MEDICINE

## 2021-05-26 PROCEDURE — 93793 PR ANTICOAGULANT MGMT FOR PT TAKING WARFARIN: ICD-10-PCS | Mod: S$GLB,,,

## 2021-05-26 PROCEDURE — 36415 COLL VENOUS BLD VENIPUNCTURE: CPT | Mod: PO | Performed by: INTERNAL MEDICINE

## 2021-06-11 ENCOUNTER — OFFICE VISIT (OUTPATIENT)
Dept: HEMATOLOGY/ONCOLOGY | Facility: CLINIC | Age: 81
End: 2021-06-11
Payer: MEDICARE

## 2021-06-11 VITALS
DIASTOLIC BLOOD PRESSURE: 68 MMHG | TEMPERATURE: 98 F | OXYGEN SATURATION: 96 % | HEART RATE: 74 BPM | SYSTOLIC BLOOD PRESSURE: 104 MMHG | WEIGHT: 162.69 LBS | HEIGHT: 68 IN | BODY MASS INDEX: 24.66 KG/M2

## 2021-06-11 DIAGNOSIS — Z85.46 HISTORY OF PROSTATE CANCER: ICD-10-CM

## 2021-06-11 DIAGNOSIS — D68.59 HYPERCOAGULABLE STATE: ICD-10-CM

## 2021-06-11 DIAGNOSIS — I26.99 PULMONARY EMBOLISM, UNSPECIFIED CHRONICITY, UNSPECIFIED PULMONARY EMBOLISM TYPE, UNSPECIFIED WHETHER ACUTE COR PULMONALE PRESENT: Primary | ICD-10-CM

## 2021-06-11 PROCEDURE — 1101F PT FALLS ASSESS-DOCD LE1/YR: CPT | Mod: CPTII,S$GLB,, | Performed by: INTERNAL MEDICINE

## 2021-06-11 PROCEDURE — 1159F PR MEDICATION LIST DOCUMENTED IN MEDICAL RECORD: ICD-10-PCS | Mod: S$GLB,,, | Performed by: INTERNAL MEDICINE

## 2021-06-11 PROCEDURE — 1157F ADVNC CARE PLAN IN RCRD: CPT | Mod: S$GLB,,, | Performed by: INTERNAL MEDICINE

## 2021-06-11 PROCEDURE — 1101F PR PT FALLS ASSESS DOC 0-1 FALLS W/OUT INJ PAST YR: ICD-10-PCS | Mod: CPTII,S$GLB,, | Performed by: INTERNAL MEDICINE

## 2021-06-11 PROCEDURE — 1126F AMNT PAIN NOTED NONE PRSNT: CPT | Mod: S$GLB,,, | Performed by: INTERNAL MEDICINE

## 2021-06-11 PROCEDURE — 1157F PR ADVANCE CARE PLAN OR EQUIV PRESENT IN MEDICAL RECORD: ICD-10-PCS | Mod: S$GLB,,, | Performed by: INTERNAL MEDICINE

## 2021-06-11 PROCEDURE — 1126F PR PAIN SEVERITY QUANTIFIED, NO PAIN PRESENT: ICD-10-PCS | Mod: S$GLB,,, | Performed by: INTERNAL MEDICINE

## 2021-06-11 PROCEDURE — 99213 PR OFFICE/OUTPT VISIT, EST, LEVL III, 20-29 MIN: ICD-10-PCS | Mod: S$GLB,,, | Performed by: INTERNAL MEDICINE

## 2021-06-11 PROCEDURE — 1159F MED LIST DOCD IN RCRD: CPT | Mod: S$GLB,,, | Performed by: INTERNAL MEDICINE

## 2021-06-11 PROCEDURE — 99213 OFFICE O/P EST LOW 20 MIN: CPT | Mod: S$GLB,,, | Performed by: INTERNAL MEDICINE

## 2021-06-11 PROCEDURE — 99499 UNLISTED E&M SERVICE: CPT | Mod: S$GLB,,, | Performed by: INTERNAL MEDICINE

## 2021-06-11 PROCEDURE — 99499 RISK ADDL DX/OHS AUDIT: ICD-10-PCS | Mod: S$GLB,,, | Performed by: INTERNAL MEDICINE

## 2021-06-11 PROCEDURE — 99999 PR PBB SHADOW E&M-EST. PATIENT-LVL III: ICD-10-PCS | Mod: PBBFAC,,, | Performed by: INTERNAL MEDICINE

## 2021-06-11 PROCEDURE — 3288F FALL RISK ASSESSMENT DOCD: CPT | Mod: CPTII,S$GLB,, | Performed by: INTERNAL MEDICINE

## 2021-06-11 PROCEDURE — 99999 PR PBB SHADOW E&M-EST. PATIENT-LVL III: CPT | Mod: PBBFAC,,, | Performed by: INTERNAL MEDICINE

## 2021-06-11 PROCEDURE — 3288F PR FALLS RISK ASSESSMENT DOCUMENTED: ICD-10-PCS | Mod: CPTII,S$GLB,, | Performed by: INTERNAL MEDICINE

## 2021-06-21 ENCOUNTER — LAB VISIT (OUTPATIENT)
Dept: LAB | Facility: HOSPITAL | Age: 81
End: 2021-06-21
Attending: INTERNAL MEDICINE
Payer: MEDICARE

## 2021-06-21 DIAGNOSIS — D68.59 HYPERCOAGULABLE STATE: ICD-10-CM

## 2021-06-21 DIAGNOSIS — I26.99 PULMONARY EMBOLISM, UNSPECIFIED CHRONICITY, UNSPECIFIED PULMONARY EMBOLISM TYPE, UNSPECIFIED WHETHER ACUTE COR PULMONALE PRESENT: ICD-10-CM

## 2021-06-21 PROCEDURE — 36415 COLL VENOUS BLD VENIPUNCTURE: CPT | Performed by: INTERNAL MEDICINE

## 2021-06-21 PROCEDURE — 85303 CLOT INHIBIT PROT C ACTIVITY: CPT | Performed by: INTERNAL MEDICINE

## 2021-06-21 PROCEDURE — 85732 THROMBOPLASTIN TIME PARTIAL: CPT | Performed by: INTERNAL MEDICINE

## 2021-06-21 PROCEDURE — 85670 THROMBIN TIME PLASMA: CPT | Performed by: INTERNAL MEDICINE

## 2021-06-21 PROCEDURE — 85613 RUSSELL VIPER VENOM DILUTED: CPT | Performed by: INTERNAL MEDICINE

## 2021-06-21 PROCEDURE — 85305 CLOT INHIBIT PROT S TOTAL: CPT | Performed by: INTERNAL MEDICINE

## 2021-06-21 PROCEDURE — 86147 CARDIOLIPIN ANTIBODY EA IG: CPT | Mod: 59 | Performed by: INTERNAL MEDICINE

## 2021-06-23 LAB
CARDIOLIPIN IGG SER IA-ACNC: <9.4 GPL (ref 0–14.99)
CARDIOLIPIN IGM SER IA-ACNC: 41.7 MPL (ref 0–12.49)
PROT C ACT/NOR PPP CHRO: 97 % (ref 70–150)
PROT S ACT/NOR PPP: 85 % (ref 65–160)

## 2021-06-24 LAB
APTT IMM NP PPP: 44 SEC (ref 32–48)
APTT P HEP NEUT PPP: ABNORMAL SEC (ref 32–48)
CONFIRM APTT STACLOT: ABNORMAL
DRVVT SCREEN TO CONFIRM RATIO: ABNORMAL RATIO
LA 3 SCREEN W REFLEX-IMP: ABNORMAL
LA NT DPL PPP QL: ABNORMAL
MIXING DRVVT: ABNORMAL SEC (ref 33–44)
PROTHROMBIN TIME: 13.3 SEC (ref 12–15.5)
REPTILASE TIME: ABNORMAL SEC
SCREEN APTT: 60 SEC (ref 32–48)
SCREEN DRVVT: 41 SEC (ref 33–44)
THROMBIN TIME: 17.7 SEC (ref 14.7–19.5)

## 2021-06-25 ENCOUNTER — OFFICE VISIT (OUTPATIENT)
Dept: HEMATOLOGY/ONCOLOGY | Facility: CLINIC | Age: 81
End: 2021-06-25
Payer: MEDICARE

## 2021-06-25 ENCOUNTER — ANTI-COAG VISIT (OUTPATIENT)
Dept: CARDIOLOGY | Facility: CLINIC | Age: 81
End: 2021-06-25
Payer: MEDICARE

## 2021-06-25 ENCOUNTER — LAB VISIT (OUTPATIENT)
Dept: LAB | Facility: HOSPITAL | Age: 81
End: 2021-06-25
Attending: INTERNAL MEDICINE
Payer: MEDICARE

## 2021-06-25 VITALS
DIASTOLIC BLOOD PRESSURE: 59 MMHG | OXYGEN SATURATION: 99 % | HEART RATE: 55 BPM | TEMPERATURE: 98 F | SYSTOLIC BLOOD PRESSURE: 109 MMHG | HEIGHT: 69 IN | WEIGHT: 156.31 LBS | BODY MASS INDEX: 23.15 KG/M2

## 2021-06-25 DIAGNOSIS — Z85.46 HISTORY OF PROSTATE CANCER: ICD-10-CM

## 2021-06-25 DIAGNOSIS — I26.99 PULMONARY EMBOLISM, UNSPECIFIED CHRONICITY, UNSPECIFIED PULMONARY EMBOLISM TYPE, UNSPECIFIED WHETHER ACUTE COR PULMONALE PRESENT: Primary | ICD-10-CM

## 2021-06-25 DIAGNOSIS — D68.59 HYPERCOAGULABLE STATE: ICD-10-CM

## 2021-06-25 DIAGNOSIS — R76.0 POSITIVE CARDIOLIPIN ANTIBODIES: ICD-10-CM

## 2021-06-25 DIAGNOSIS — Z79.01 LONG TERM (CURRENT) USE OF ANTICOAGULANTS: ICD-10-CM

## 2021-06-25 DIAGNOSIS — Z79.01 LONG TERM (CURRENT) USE OF ANTICOAGULANTS: Primary | ICD-10-CM

## 2021-06-25 DIAGNOSIS — I26.99 PULMONARY EMBOLISM, UNSPECIFIED CHRONICITY, UNSPECIFIED PULMONARY EMBOLISM TYPE, UNSPECIFIED WHETHER ACUTE COR PULMONALE PRESENT: ICD-10-CM

## 2021-06-25 LAB
INR PPP: 1.4 (ref 0.8–1.2)
PROTHROMBIN TIME: 14.9 SEC (ref 9–12.5)

## 2021-06-25 PROCEDURE — 1157F ADVNC CARE PLAN IN RCRD: CPT | Mod: S$GLB,,, | Performed by: INTERNAL MEDICINE

## 2021-06-25 PROCEDURE — 1159F MED LIST DOCD IN RCRD: CPT | Mod: S$GLB,,, | Performed by: INTERNAL MEDICINE

## 2021-06-25 PROCEDURE — 99999 PR PBB SHADOW E&M-EST. PATIENT-LVL IV: CPT | Mod: PBBFAC,,, | Performed by: INTERNAL MEDICINE

## 2021-06-25 PROCEDURE — 1159F PR MEDICATION LIST DOCUMENTED IN MEDICAL RECORD: ICD-10-PCS | Mod: S$GLB,,, | Performed by: INTERNAL MEDICINE

## 2021-06-25 PROCEDURE — 99213 OFFICE O/P EST LOW 20 MIN: CPT | Mod: S$GLB,,, | Performed by: INTERNAL MEDICINE

## 2021-06-25 PROCEDURE — 36415 COLL VENOUS BLD VENIPUNCTURE: CPT | Performed by: INTERNAL MEDICINE

## 2021-06-25 PROCEDURE — 85610 PROTHROMBIN TIME: CPT | Performed by: INTERNAL MEDICINE

## 2021-06-25 PROCEDURE — 1101F PR PT FALLS ASSESS DOC 0-1 FALLS W/OUT INJ PAST YR: ICD-10-PCS | Mod: CPTII,S$GLB,, | Performed by: INTERNAL MEDICINE

## 2021-06-25 PROCEDURE — 99213 PR OFFICE/OUTPT VISIT, EST, LEVL III, 20-29 MIN: ICD-10-PCS | Mod: S$GLB,,, | Performed by: INTERNAL MEDICINE

## 2021-06-25 PROCEDURE — 1126F AMNT PAIN NOTED NONE PRSNT: CPT | Mod: S$GLB,,, | Performed by: INTERNAL MEDICINE

## 2021-06-25 PROCEDURE — 99999 PR PBB SHADOW E&M-EST. PATIENT-LVL IV: ICD-10-PCS | Mod: PBBFAC,,, | Performed by: INTERNAL MEDICINE

## 2021-06-25 PROCEDURE — 1101F PT FALLS ASSESS-DOCD LE1/YR: CPT | Mod: CPTII,S$GLB,, | Performed by: INTERNAL MEDICINE

## 2021-06-25 PROCEDURE — 1157F PR ADVANCE CARE PLAN OR EQUIV PRESENT IN MEDICAL RECORD: ICD-10-PCS | Mod: S$GLB,,, | Performed by: INTERNAL MEDICINE

## 2021-06-25 PROCEDURE — 93793 PR ANTICOAGULANT MGMT FOR PT TAKING WARFARIN: ICD-10-PCS | Mod: S$GLB,,,

## 2021-06-25 PROCEDURE — 93793 ANTICOAG MGMT PT WARFARIN: CPT | Mod: S$GLB,,,

## 2021-06-25 PROCEDURE — 1126F PR PAIN SEVERITY QUANTIFIED, NO PAIN PRESENT: ICD-10-PCS | Mod: S$GLB,,, | Performed by: INTERNAL MEDICINE

## 2021-06-25 PROCEDURE — 3288F PR FALLS RISK ASSESSMENT DOCUMENTED: ICD-10-PCS | Mod: CPTII,S$GLB,, | Performed by: INTERNAL MEDICINE

## 2021-06-25 PROCEDURE — 3288F FALL RISK ASSESSMENT DOCD: CPT | Mod: CPTII,S$GLB,, | Performed by: INTERNAL MEDICINE

## 2021-06-29 ENCOUNTER — TELEPHONE (OUTPATIENT)
Dept: HEMATOLOGY/ONCOLOGY | Facility: CLINIC | Age: 81
End: 2021-06-29

## 2021-07-01 ENCOUNTER — LAB VISIT (OUTPATIENT)
Dept: LAB | Facility: HOSPITAL | Age: 81
End: 2021-07-01
Attending: INTERNAL MEDICINE
Payer: MEDICARE

## 2021-07-01 DIAGNOSIS — Z79.01 LONG TERM (CURRENT) USE OF ANTICOAGULANTS: ICD-10-CM

## 2021-07-01 DIAGNOSIS — I26.99 PULMONARY EMBOLISM, UNSPECIFIED CHRONICITY, UNSPECIFIED PULMONARY EMBOLISM TYPE, UNSPECIFIED WHETHER ACUTE COR PULMONALE PRESENT: ICD-10-CM

## 2021-07-01 LAB
INR PPP: 1.9 (ref 0.8–1.2)
PROTHROMBIN TIME: 19.3 SEC (ref 9–12.5)

## 2021-07-01 PROCEDURE — 85610 PROTHROMBIN TIME: CPT | Performed by: INTERNAL MEDICINE

## 2021-07-01 PROCEDURE — 36415 COLL VENOUS BLD VENIPUNCTURE: CPT | Mod: PO | Performed by: INTERNAL MEDICINE

## 2021-07-02 ENCOUNTER — ANTI-COAG VISIT (OUTPATIENT)
Dept: CARDIOLOGY | Facility: CLINIC | Age: 81
End: 2021-07-02
Payer: MEDICARE

## 2021-07-02 DIAGNOSIS — Z79.01 LONG TERM (CURRENT) USE OF ANTICOAGULANTS: Primary | ICD-10-CM

## 2021-07-02 PROCEDURE — 93793 ANTICOAG MGMT PT WARFARIN: CPT | Mod: S$GLB,,,

## 2021-07-02 PROCEDURE — 93793 PR ANTICOAGULANT MGMT FOR PT TAKING WARFARIN: ICD-10-PCS | Mod: S$GLB,,,

## 2021-07-15 ENCOUNTER — LAB VISIT (OUTPATIENT)
Dept: LAB | Facility: HOSPITAL | Age: 81
End: 2021-07-15
Attending: INTERNAL MEDICINE
Payer: MEDICARE

## 2021-07-15 ENCOUNTER — ANTI-COAG VISIT (OUTPATIENT)
Dept: CARDIOLOGY | Facility: CLINIC | Age: 81
End: 2021-07-15
Payer: MEDICARE

## 2021-07-15 DIAGNOSIS — I26.99 PULMONARY EMBOLISM, UNSPECIFIED CHRONICITY, UNSPECIFIED PULMONARY EMBOLISM TYPE, UNSPECIFIED WHETHER ACUTE COR PULMONALE PRESENT: ICD-10-CM

## 2021-07-15 DIAGNOSIS — Z79.01 LONG TERM (CURRENT) USE OF ANTICOAGULANTS: Primary | ICD-10-CM

## 2021-07-15 DIAGNOSIS — Z79.01 LONG TERM (CURRENT) USE OF ANTICOAGULANTS: ICD-10-CM

## 2021-07-15 LAB
INR PPP: 2.7 (ref 0.8–1.2)
PROTHROMBIN TIME: 27.4 SEC (ref 9–12.5)

## 2021-07-15 PROCEDURE — 85610 PROTHROMBIN TIME: CPT | Performed by: INTERNAL MEDICINE

## 2021-07-15 PROCEDURE — 93793 ANTICOAG MGMT PT WARFARIN: CPT | Mod: S$GLB,,,

## 2021-07-15 PROCEDURE — 93793 PR ANTICOAGULANT MGMT FOR PT TAKING WARFARIN: ICD-10-PCS | Mod: S$GLB,,,

## 2021-07-15 PROCEDURE — 36415 COLL VENOUS BLD VENIPUNCTURE: CPT | Mod: PO | Performed by: INTERNAL MEDICINE

## 2021-08-04 RX ORDER — WARFARIN 3 MG/1
4.5 TABLET ORAL DAILY
Qty: 45 TABLET | Refills: 11 | Status: SHIPPED | OUTPATIENT
Start: 2021-08-04 | End: 2021-11-19 | Stop reason: SDUPTHER

## 2021-08-12 ENCOUNTER — ANTI-COAG VISIT (OUTPATIENT)
Dept: CARDIOLOGY | Facility: CLINIC | Age: 81
End: 2021-08-12
Payer: MEDICARE

## 2021-08-12 ENCOUNTER — LAB VISIT (OUTPATIENT)
Dept: LAB | Facility: HOSPITAL | Age: 81
End: 2021-08-12
Attending: INTERNAL MEDICINE
Payer: MEDICARE

## 2021-08-12 DIAGNOSIS — Z79.01 LONG TERM (CURRENT) USE OF ANTICOAGULANTS: Primary | ICD-10-CM

## 2021-08-12 DIAGNOSIS — Z79.01 LONG TERM (CURRENT) USE OF ANTICOAGULANTS: ICD-10-CM

## 2021-08-12 DIAGNOSIS — I26.99 PULMONARY EMBOLISM, UNSPECIFIED CHRONICITY, UNSPECIFIED PULMONARY EMBOLISM TYPE, UNSPECIFIED WHETHER ACUTE COR PULMONALE PRESENT: ICD-10-CM

## 2021-08-12 LAB
INR PPP: 3.3 (ref 0.8–1.2)
PROTHROMBIN TIME: 32.4 SEC (ref 9–12.5)

## 2021-08-12 PROCEDURE — 93793 PR ANTICOAGULANT MGMT FOR PT TAKING WARFARIN: ICD-10-PCS | Mod: S$GLB,,,

## 2021-08-12 PROCEDURE — 85610 PROTHROMBIN TIME: CPT | Performed by: INTERNAL MEDICINE

## 2021-08-12 PROCEDURE — 93793 ANTICOAG MGMT PT WARFARIN: CPT | Mod: S$GLB,,,

## 2021-08-12 PROCEDURE — 36415 COLL VENOUS BLD VENIPUNCTURE: CPT | Mod: PO | Performed by: INTERNAL MEDICINE

## 2021-08-20 ENCOUNTER — OFFICE VISIT (OUTPATIENT)
Dept: HEMATOLOGY/ONCOLOGY | Facility: CLINIC | Age: 81
End: 2021-08-20
Payer: MEDICARE

## 2021-08-20 VITALS
HEART RATE: 64 BPM | DIASTOLIC BLOOD PRESSURE: 67 MMHG | SYSTOLIC BLOOD PRESSURE: 104 MMHG | HEIGHT: 69 IN | BODY MASS INDEX: 23.9 KG/M2 | TEMPERATURE: 98 F | OXYGEN SATURATION: 97 % | WEIGHT: 161.38 LBS

## 2021-08-20 DIAGNOSIS — I27.82 OTHER CHRONIC PULMONARY EMBOLISM WITHOUT ACUTE COR PULMONALE: Primary | ICD-10-CM

## 2021-08-20 DIAGNOSIS — Z51.81 ENCOUNTER FOR THERAPEUTIC DRUG LEVEL MONITORING: ICD-10-CM

## 2021-08-20 PROCEDURE — 99999 PR PBB SHADOW E&M-EST. PATIENT-LVL V: CPT | Mod: PBBFAC,,, | Performed by: STUDENT IN AN ORGANIZED HEALTH CARE EDUCATION/TRAINING PROGRAM

## 2021-08-20 PROCEDURE — 1160F RVW MEDS BY RX/DR IN RCRD: CPT | Mod: CPTII,S$GLB,, | Performed by: STUDENT IN AN ORGANIZED HEALTH CARE EDUCATION/TRAINING PROGRAM

## 2021-08-20 PROCEDURE — 3288F PR FALLS RISK ASSESSMENT DOCUMENTED: ICD-10-PCS | Mod: CPTII,S$GLB,, | Performed by: STUDENT IN AN ORGANIZED HEALTH CARE EDUCATION/TRAINING PROGRAM

## 2021-08-20 PROCEDURE — 99999 PR PBB SHADOW E&M-EST. PATIENT-LVL V: ICD-10-PCS | Mod: PBBFAC,,, | Performed by: STUDENT IN AN ORGANIZED HEALTH CARE EDUCATION/TRAINING PROGRAM

## 2021-08-20 PROCEDURE — 99215 PR OFFICE/OUTPT VISIT, EST, LEVL V, 40-54 MIN: ICD-10-PCS | Mod: S$GLB,,, | Performed by: STUDENT IN AN ORGANIZED HEALTH CARE EDUCATION/TRAINING PROGRAM

## 2021-08-20 PROCEDURE — 1159F MED LIST DOCD IN RCRD: CPT | Mod: CPTII,S$GLB,, | Performed by: STUDENT IN AN ORGANIZED HEALTH CARE EDUCATION/TRAINING PROGRAM

## 2021-08-20 PROCEDURE — 3078F DIAST BP <80 MM HG: CPT | Mod: CPTII,S$GLB,, | Performed by: STUDENT IN AN ORGANIZED HEALTH CARE EDUCATION/TRAINING PROGRAM

## 2021-08-20 PROCEDURE — 1160F PR REVIEW ALL MEDS BY PRESCRIBER/CLIN PHARMACIST DOCUMENTED: ICD-10-PCS | Mod: CPTII,S$GLB,, | Performed by: STUDENT IN AN ORGANIZED HEALTH CARE EDUCATION/TRAINING PROGRAM

## 2021-08-20 PROCEDURE — 3288F FALL RISK ASSESSMENT DOCD: CPT | Mod: CPTII,S$GLB,, | Performed by: STUDENT IN AN ORGANIZED HEALTH CARE EDUCATION/TRAINING PROGRAM

## 2021-08-20 PROCEDURE — 1101F PT FALLS ASSESS-DOCD LE1/YR: CPT | Mod: CPTII,S$GLB,, | Performed by: STUDENT IN AN ORGANIZED HEALTH CARE EDUCATION/TRAINING PROGRAM

## 2021-08-20 PROCEDURE — 1126F AMNT PAIN NOTED NONE PRSNT: CPT | Mod: CPTII,S$GLB,, | Performed by: STUDENT IN AN ORGANIZED HEALTH CARE EDUCATION/TRAINING PROGRAM

## 2021-08-20 PROCEDURE — 1157F PR ADVANCE CARE PLAN OR EQUIV PRESENT IN MEDICAL RECORD: ICD-10-PCS | Mod: CPTII,S$GLB,, | Performed by: STUDENT IN AN ORGANIZED HEALTH CARE EDUCATION/TRAINING PROGRAM

## 2021-08-20 PROCEDURE — 3078F PR MOST RECENT DIASTOLIC BLOOD PRESSURE < 80 MM HG: ICD-10-PCS | Mod: CPTII,S$GLB,, | Performed by: STUDENT IN AN ORGANIZED HEALTH CARE EDUCATION/TRAINING PROGRAM

## 2021-08-20 PROCEDURE — 1157F ADVNC CARE PLAN IN RCRD: CPT | Mod: CPTII,S$GLB,, | Performed by: STUDENT IN AN ORGANIZED HEALTH CARE EDUCATION/TRAINING PROGRAM

## 2021-08-20 PROCEDURE — 99215 OFFICE O/P EST HI 40 MIN: CPT | Mod: S$GLB,,, | Performed by: STUDENT IN AN ORGANIZED HEALTH CARE EDUCATION/TRAINING PROGRAM

## 2021-08-20 PROCEDURE — 1126F PR PAIN SEVERITY QUANTIFIED, NO PAIN PRESENT: ICD-10-PCS | Mod: CPTII,S$GLB,, | Performed by: STUDENT IN AN ORGANIZED HEALTH CARE EDUCATION/TRAINING PROGRAM

## 2021-08-20 PROCEDURE — 3074F PR MOST RECENT SYSTOLIC BLOOD PRESSURE < 130 MM HG: ICD-10-PCS | Mod: CPTII,S$GLB,, | Performed by: STUDENT IN AN ORGANIZED HEALTH CARE EDUCATION/TRAINING PROGRAM

## 2021-08-20 PROCEDURE — 99499 RISK ADDL DX/OHS AUDIT: ICD-10-PCS | Mod: S$GLB,,, | Performed by: STUDENT IN AN ORGANIZED HEALTH CARE EDUCATION/TRAINING PROGRAM

## 2021-08-20 PROCEDURE — 3074F SYST BP LT 130 MM HG: CPT | Mod: CPTII,S$GLB,, | Performed by: STUDENT IN AN ORGANIZED HEALTH CARE EDUCATION/TRAINING PROGRAM

## 2021-08-20 PROCEDURE — 1101F PR PT FALLS ASSESS DOC 0-1 FALLS W/OUT INJ PAST YR: ICD-10-PCS | Mod: CPTII,S$GLB,, | Performed by: STUDENT IN AN ORGANIZED HEALTH CARE EDUCATION/TRAINING PROGRAM

## 2021-08-20 PROCEDURE — 99499 UNLISTED E&M SERVICE: CPT | Mod: S$GLB,,, | Performed by: STUDENT IN AN ORGANIZED HEALTH CARE EDUCATION/TRAINING PROGRAM

## 2021-08-20 PROCEDURE — 1159F PR MEDICATION LIST DOCUMENTED IN MEDICAL RECORD: ICD-10-PCS | Mod: CPTII,S$GLB,, | Performed by: STUDENT IN AN ORGANIZED HEALTH CARE EDUCATION/TRAINING PROGRAM

## 2021-08-22 PROBLEM — Z79.01 LONG TERM (CURRENT) USE OF ANTICOAGULANTS: Status: RESOLVED | Noted: 2021-02-03 | Resolved: 2021-08-22

## 2021-08-24 ENCOUNTER — TELEPHONE (OUTPATIENT)
Dept: FAMILY MEDICINE | Facility: CLINIC | Age: 81
End: 2021-08-24

## 2021-08-24 DIAGNOSIS — E11.40 CONTROLLED TYPE 2 DIABETES WITH NEUROPATHY: Primary | ICD-10-CM

## 2021-08-25 ENCOUNTER — TELEPHONE (OUTPATIENT)
Dept: FAMILY MEDICINE | Facility: CLINIC | Age: 81
End: 2021-08-25

## 2021-08-27 ENCOUNTER — HOSPITAL ENCOUNTER (OUTPATIENT)
Dept: RADIOLOGY | Facility: HOSPITAL | Age: 81
Discharge: HOME | End: 2021-08-27
Attending: STUDENT IN AN ORGANIZED HEALTH CARE EDUCATION/TRAINING PROGRAM
Payer: MEDICARE

## 2021-08-27 DIAGNOSIS — I27.82 OTHER CHRONIC PULMONARY EMBOLISM WITHOUT ACUTE COR PULMONALE: ICD-10-CM

## 2021-08-27 PROCEDURE — 25500020 PHARM REV CODE 255: Performed by: STUDENT IN AN ORGANIZED HEALTH CARE EDUCATION/TRAINING PROGRAM

## 2021-08-27 PROCEDURE — 71275 CTA CHEST NON CORONARY: ICD-10-PCS | Mod: 26,,, | Performed by: RADIOLOGY

## 2021-08-27 PROCEDURE — 71275 CT ANGIOGRAPHY CHEST: CPT | Mod: TC

## 2021-08-27 PROCEDURE — 71275 CT ANGIOGRAPHY CHEST: CPT | Mod: 26,,, | Performed by: RADIOLOGY

## 2021-08-27 RX ADMIN — IOHEXOL 75 ML: 350 INJECTION, SOLUTION INTRAVENOUS at 09:08

## 2021-09-08 ENCOUNTER — LAB VISIT (OUTPATIENT)
Dept: LAB | Facility: HOSPITAL | Age: 81
End: 2021-09-08
Attending: INTERNAL MEDICINE
Payer: MEDICARE

## 2021-09-08 DIAGNOSIS — E11.40 CONTROLLED TYPE 2 DIABETES WITH NEUROPATHY: ICD-10-CM

## 2021-09-08 LAB
ALBUMIN SERPL BCP-MCNC: 3.8 G/DL (ref 3.5–5.2)
ALP SERPL-CCNC: 66 U/L (ref 55–135)
ALT SERPL W/O P-5'-P-CCNC: 14 U/L (ref 10–44)
ANION GAP SERPL CALC-SCNC: 9 MMOL/L (ref 8–16)
AST SERPL-CCNC: 19 U/L (ref 10–40)
BILIRUB SERPL-MCNC: 0.7 MG/DL (ref 0.1–1)
BUN SERPL-MCNC: 7 MG/DL (ref 8–23)
CALCIUM SERPL-MCNC: 9.5 MG/DL (ref 8.7–10.5)
CHLORIDE SERPL-SCNC: 104 MMOL/L (ref 95–110)
CO2 SERPL-SCNC: 27 MMOL/L (ref 23–29)
CREAT SERPL-MCNC: 0.8 MG/DL (ref 0.5–1.4)
EST. GFR  (AFRICAN AMERICAN): >60 ML/MIN/1.73 M^2
EST. GFR  (NON AFRICAN AMERICAN): >60 ML/MIN/1.73 M^2
ESTIMATED AVG GLUCOSE: 114 MG/DL (ref 68–131)
GLUCOSE SERPL-MCNC: 116 MG/DL (ref 70–110)
HBA1C MFR BLD: 5.6 % (ref 4–5.6)
POTASSIUM SERPL-SCNC: 4.1 MMOL/L (ref 3.5–5.1)
PROT SERPL-MCNC: 7.7 G/DL (ref 6–8.4)
SODIUM SERPL-SCNC: 140 MMOL/L (ref 136–145)

## 2021-09-08 PROCEDURE — 36415 COLL VENOUS BLD VENIPUNCTURE: CPT | Mod: PO | Performed by: INTERNAL MEDICINE

## 2021-09-08 PROCEDURE — 83036 HEMOGLOBIN GLYCOSYLATED A1C: CPT | Performed by: INTERNAL MEDICINE

## 2021-09-08 PROCEDURE — 80053 COMPREHEN METABOLIC PANEL: CPT | Performed by: INTERNAL MEDICINE

## 2021-09-09 ENCOUNTER — TELEPHONE (OUTPATIENT)
Dept: FAMILY MEDICINE | Facility: CLINIC | Age: 81
End: 2021-09-09

## 2021-09-10 ENCOUNTER — OFFICE VISIT (OUTPATIENT)
Dept: FAMILY MEDICINE | Facility: CLINIC | Age: 81
End: 2021-09-10
Payer: MEDICARE

## 2021-09-10 VITALS
OXYGEN SATURATION: 96 % | HEIGHT: 69 IN | WEIGHT: 158.38 LBS | HEART RATE: 60 BPM | TEMPERATURE: 98 F | SYSTOLIC BLOOD PRESSURE: 104 MMHG | BODY MASS INDEX: 23.46 KG/M2 | DIASTOLIC BLOOD PRESSURE: 62 MMHG

## 2021-09-10 DIAGNOSIS — E78.5 HYPERLIPIDEMIA ASSOCIATED WITH TYPE 2 DIABETES MELLITUS: ICD-10-CM

## 2021-09-10 DIAGNOSIS — I15.2 HYPERTENSION ASSOCIATED WITH TYPE 2 DIABETES MELLITUS: ICD-10-CM

## 2021-09-10 DIAGNOSIS — E11.69 HYPERLIPIDEMIA ASSOCIATED WITH TYPE 2 DIABETES MELLITUS: ICD-10-CM

## 2021-09-10 DIAGNOSIS — E11.40 CONTROLLED TYPE 2 DIABETES WITH NEUROPATHY: Primary | ICD-10-CM

## 2021-09-10 DIAGNOSIS — E11.59 HYPERTENSION ASSOCIATED WITH TYPE 2 DIABETES MELLITUS: ICD-10-CM

## 2021-09-10 DIAGNOSIS — Z85.46 HISTORY OF PROSTATE CANCER: ICD-10-CM

## 2021-09-10 DIAGNOSIS — I27.20 MILD PULMONARY HYPERTENSION: ICD-10-CM

## 2021-09-10 DIAGNOSIS — I26.99 PULMONARY EMBOLISM, UNSPECIFIED CHRONICITY, UNSPECIFIED PULMONARY EMBOLISM TYPE, UNSPECIFIED WHETHER ACUTE COR PULMONALE PRESENT: ICD-10-CM

## 2021-09-10 DIAGNOSIS — D68.59 HYPERCOAGULABLE STATE: ICD-10-CM

## 2021-09-10 DIAGNOSIS — J44.9 CHRONIC OBSTRUCTIVE PULMONARY DISEASE, UNSPECIFIED COPD TYPE: ICD-10-CM

## 2021-09-10 DIAGNOSIS — I70.0 ATHEROSCLEROSIS OF AORTA: ICD-10-CM

## 2021-09-10 PROCEDURE — 1126F AMNT PAIN NOTED NONE PRSNT: CPT | Mod: CPTII,S$GLB,, | Performed by: NURSE PRACTITIONER

## 2021-09-10 PROCEDURE — 3074F PR MOST RECENT SYSTOLIC BLOOD PRESSURE < 130 MM HG: ICD-10-PCS | Mod: CPTII,S$GLB,, | Performed by: NURSE PRACTITIONER

## 2021-09-10 PROCEDURE — 1160F RVW MEDS BY RX/DR IN RCRD: CPT | Mod: CPTII,S$GLB,, | Performed by: NURSE PRACTITIONER

## 2021-09-10 PROCEDURE — 3288F FALL RISK ASSESSMENT DOCD: CPT | Mod: CPTII,S$GLB,, | Performed by: NURSE PRACTITIONER

## 2021-09-10 PROCEDURE — 3078F PR MOST RECENT DIASTOLIC BLOOD PRESSURE < 80 MM HG: ICD-10-PCS | Mod: CPTII,S$GLB,, | Performed by: NURSE PRACTITIONER

## 2021-09-10 PROCEDURE — 99999 PR PBB SHADOW E&M-EST. PATIENT-LVL IV: CPT | Mod: PBBFAC,,, | Performed by: NURSE PRACTITIONER

## 2021-09-10 PROCEDURE — 99499 RISK ADDL DX/OHS AUDIT: ICD-10-PCS | Mod: S$GLB,,, | Performed by: NURSE PRACTITIONER

## 2021-09-10 PROCEDURE — 1100F PR PT FALLS ASSESS DOC 2+ FALLS/FALL W/INJURY/YR: ICD-10-PCS | Mod: CPTII,S$GLB,, | Performed by: NURSE PRACTITIONER

## 2021-09-10 PROCEDURE — 3074F SYST BP LT 130 MM HG: CPT | Mod: CPTII,S$GLB,, | Performed by: NURSE PRACTITIONER

## 2021-09-10 PROCEDURE — 1157F ADVNC CARE PLAN IN RCRD: CPT | Mod: CPTII,S$GLB,, | Performed by: NURSE PRACTITIONER

## 2021-09-10 PROCEDURE — 99499 UNLISTED E&M SERVICE: CPT | Mod: S$GLB,,, | Performed by: NURSE PRACTITIONER

## 2021-09-10 PROCEDURE — 1157F PR ADVANCE CARE PLAN OR EQUIV PRESENT IN MEDICAL RECORD: ICD-10-PCS | Mod: CPTII,S$GLB,, | Performed by: NURSE PRACTITIONER

## 2021-09-10 PROCEDURE — 1126F PR PAIN SEVERITY QUANTIFIED, NO PAIN PRESENT: ICD-10-PCS | Mod: CPTII,S$GLB,, | Performed by: NURSE PRACTITIONER

## 2021-09-10 PROCEDURE — 1159F PR MEDICATION LIST DOCUMENTED IN MEDICAL RECORD: ICD-10-PCS | Mod: CPTII,S$GLB,, | Performed by: NURSE PRACTITIONER

## 2021-09-10 PROCEDURE — 3078F DIAST BP <80 MM HG: CPT | Mod: CPTII,S$GLB,, | Performed by: NURSE PRACTITIONER

## 2021-09-10 PROCEDURE — 3288F PR FALLS RISK ASSESSMENT DOCUMENTED: ICD-10-PCS | Mod: CPTII,S$GLB,, | Performed by: NURSE PRACTITIONER

## 2021-09-10 PROCEDURE — 1159F MED LIST DOCD IN RCRD: CPT | Mod: CPTII,S$GLB,, | Performed by: NURSE PRACTITIONER

## 2021-09-10 PROCEDURE — 99999 PR PBB SHADOW E&M-EST. PATIENT-LVL IV: ICD-10-PCS | Mod: PBBFAC,,, | Performed by: NURSE PRACTITIONER

## 2021-09-10 PROCEDURE — 99214 PR OFFICE/OUTPT VISIT, EST, LEVL IV, 30-39 MIN: ICD-10-PCS | Mod: S$GLB,,, | Performed by: NURSE PRACTITIONER

## 2021-09-10 PROCEDURE — 1100F PTFALLS ASSESS-DOCD GE2>/YR: CPT | Mod: CPTII,S$GLB,, | Performed by: NURSE PRACTITIONER

## 2021-09-10 PROCEDURE — 99214 OFFICE O/P EST MOD 30 MIN: CPT | Mod: S$GLB,,, | Performed by: NURSE PRACTITIONER

## 2021-09-10 PROCEDURE — 1160F PR REVIEW ALL MEDS BY PRESCRIBER/CLIN PHARMACIST DOCUMENTED: ICD-10-PCS | Mod: CPTII,S$GLB,, | Performed by: NURSE PRACTITIONER

## 2021-09-15 ENCOUNTER — OFFICE VISIT (OUTPATIENT)
Dept: PULMONOLOGY | Facility: CLINIC | Age: 81
End: 2021-09-15
Payer: MEDICARE

## 2021-09-15 VITALS
HEART RATE: 64 BPM | TEMPERATURE: 98 F | SYSTOLIC BLOOD PRESSURE: 125 MMHG | HEIGHT: 69 IN | OXYGEN SATURATION: 97 % | DIASTOLIC BLOOD PRESSURE: 78 MMHG | WEIGHT: 157.88 LBS | BODY MASS INDEX: 23.38 KG/M2

## 2021-09-15 DIAGNOSIS — A31.0 MAI (MYCOBACTERIUM AVIUM-INTRACELLULARE): ICD-10-CM

## 2021-09-15 DIAGNOSIS — I26.99 PULMONARY EMBOLISM, UNSPECIFIED CHRONICITY, UNSPECIFIED PULMONARY EMBOLISM TYPE, UNSPECIFIED WHETHER ACUTE COR PULMONALE PRESENT: ICD-10-CM

## 2021-09-15 DIAGNOSIS — J43.2 CENTRILOBULAR EMPHYSEMA: ICD-10-CM

## 2021-09-15 DIAGNOSIS — J98.4 CAVITARY LUNG DISEASE: ICD-10-CM

## 2021-09-15 PROBLEM — J44.9 COPD (CHRONIC OBSTRUCTIVE PULMONARY DISEASE): Status: RESOLVED | Noted: 2021-02-01 | Resolved: 2021-09-15

## 2021-09-15 PROCEDURE — 3078F DIAST BP <80 MM HG: CPT | Mod: CPTII,S$GLB,, | Performed by: INTERNAL MEDICINE

## 2021-09-15 PROCEDURE — 1100F PR PT FALLS ASSESS DOC 2+ FALLS/FALL W/INJURY/YR: ICD-10-PCS | Mod: CPTII,S$GLB,, | Performed by: INTERNAL MEDICINE

## 2021-09-15 PROCEDURE — 3078F PR MOST RECENT DIASTOLIC BLOOD PRESSURE < 80 MM HG: ICD-10-PCS | Mod: CPTII,S$GLB,, | Performed by: INTERNAL MEDICINE

## 2021-09-15 PROCEDURE — 99999 PR PBB SHADOW E&M-EST. PATIENT-LVL IV: CPT | Mod: PBBFAC,,, | Performed by: INTERNAL MEDICINE

## 2021-09-15 PROCEDURE — 3074F SYST BP LT 130 MM HG: CPT | Mod: CPTII,S$GLB,, | Performed by: INTERNAL MEDICINE

## 2021-09-15 PROCEDURE — 1157F PR ADVANCE CARE PLAN OR EQUIV PRESENT IN MEDICAL RECORD: ICD-10-PCS | Mod: CPTII,S$GLB,, | Performed by: INTERNAL MEDICINE

## 2021-09-15 PROCEDURE — 3288F FALL RISK ASSESSMENT DOCD: CPT | Mod: CPTII,S$GLB,, | Performed by: INTERNAL MEDICINE

## 2021-09-15 PROCEDURE — 1159F MED LIST DOCD IN RCRD: CPT | Mod: CPTII,S$GLB,, | Performed by: INTERNAL MEDICINE

## 2021-09-15 PROCEDURE — 1126F AMNT PAIN NOTED NONE PRSNT: CPT | Mod: CPTII,S$GLB,, | Performed by: INTERNAL MEDICINE

## 2021-09-15 PROCEDURE — 99214 OFFICE O/P EST MOD 30 MIN: CPT | Mod: S$GLB,,, | Performed by: INTERNAL MEDICINE

## 2021-09-15 PROCEDURE — 3288F PR FALLS RISK ASSESSMENT DOCUMENTED: ICD-10-PCS | Mod: CPTII,S$GLB,, | Performed by: INTERNAL MEDICINE

## 2021-09-15 PROCEDURE — 1159F PR MEDICATION LIST DOCUMENTED IN MEDICAL RECORD: ICD-10-PCS | Mod: CPTII,S$GLB,, | Performed by: INTERNAL MEDICINE

## 2021-09-15 PROCEDURE — 1157F ADVNC CARE PLAN IN RCRD: CPT | Mod: CPTII,S$GLB,, | Performed by: INTERNAL MEDICINE

## 2021-09-15 PROCEDURE — 99214 PR OFFICE/OUTPT VISIT, EST, LEVL IV, 30-39 MIN: ICD-10-PCS | Mod: S$GLB,,, | Performed by: INTERNAL MEDICINE

## 2021-09-15 PROCEDURE — 1100F PTFALLS ASSESS-DOCD GE2>/YR: CPT | Mod: CPTII,S$GLB,, | Performed by: INTERNAL MEDICINE

## 2021-09-15 PROCEDURE — 1126F PR PAIN SEVERITY QUANTIFIED, NO PAIN PRESENT: ICD-10-PCS | Mod: CPTII,S$GLB,, | Performed by: INTERNAL MEDICINE

## 2021-09-15 PROCEDURE — 3074F PR MOST RECENT SYSTOLIC BLOOD PRESSURE < 130 MM HG: ICD-10-PCS | Mod: CPTII,S$GLB,, | Performed by: INTERNAL MEDICINE

## 2021-09-15 PROCEDURE — 99999 PR PBB SHADOW E&M-EST. PATIENT-LVL IV: ICD-10-PCS | Mod: PBBFAC,,, | Performed by: INTERNAL MEDICINE

## 2021-09-15 RX ORDER — ALBUTEROL SULFATE 90 UG/1
2 AEROSOL, METERED RESPIRATORY (INHALATION) EVERY 6 HOURS PRN
Qty: 18 G | Refills: 0 | Status: SHIPPED | OUTPATIENT
Start: 2021-09-15 | End: 2021-09-15

## 2021-09-17 ENCOUNTER — LAB VISIT (OUTPATIENT)
Dept: FAMILY MEDICINE | Facility: CLINIC | Age: 81
End: 2021-09-17
Payer: MEDICARE

## 2021-09-17 DIAGNOSIS — J43.2 CENTRILOBULAR EMPHYSEMA: ICD-10-CM

## 2021-09-17 PROCEDURE — U0005 INFEC AGEN DETEC AMPLI PROBE: HCPCS | Performed by: INTERNAL MEDICINE

## 2021-09-17 PROCEDURE — U0003 INFECTIOUS AGENT DETECTION BY NUCLEIC ACID (DNA OR RNA); SEVERE ACUTE RESPIRATORY SYNDROME CORONAVIRUS 2 (SARS-COV-2) (CORONAVIRUS DISEASE [COVID-19]), AMPLIFIED PROBE TECHNIQUE, MAKING USE OF HIGH THROUGHPUT TECHNOLOGIES AS DESCRIBED BY CMS-2020-01-R: HCPCS | Performed by: INTERNAL MEDICINE

## 2021-09-18 LAB
SARS-COV-2 RNA RESP QL NAA+PROBE: NOT DETECTED
SARS-COV-2- CYCLE NUMBER: NORMAL

## 2021-09-20 ENCOUNTER — HOSPITAL ENCOUNTER (OUTPATIENT)
Dept: RESPIRATORY THERAPY | Facility: HOSPITAL | Age: 81
Discharge: HOME | End: 2021-09-20
Attending: INTERNAL MEDICINE
Payer: MEDICARE

## 2021-09-20 VITALS — HEART RATE: 69 BPM | RESPIRATION RATE: 18 BRPM | OXYGEN SATURATION: 99 %

## 2021-09-20 DIAGNOSIS — J43.2 CENTRILOBULAR EMPHYSEMA: ICD-10-CM

## 2021-09-20 LAB
BRPFT: NORMAL
DLCO ADJ PRE: 17.48 ML/(MIN*MMHG)
DLCO SINGLE BREATH LLN: 17.55
DLCO SINGLE BREATH PRE REF: 71.4 %
DLCO SINGLE BREATH REF: 24.48
DLCOC SBVA LLN: 2.38
DLCOC SBVA PRE REF: 98.9 %
DLCOC SBVA REF: 3.55
DLCOC SINGLE BREATH LLN: 17.55
DLCOC SINGLE BREATH PRE REF: 71.4 %
DLCOC SINGLE BREATH REF: 24.48
DLCOVA LLN: 2.38
DLCOVA PRE REF: 98.9 %
DLCOVA PRE: 3.51 ML/(MIN*MMHG*L)
DLCOVA REF: 3.55
DLVAADJ PRE: 3.51 ML/(MIN*MMHG*L)
ERVN2 LLN: -16449.08
ERVN2 PRE REF: 168.2 %
ERVN2 PRE: 1.54 L
ERVN2 REF: 0.92
FEF 25 75 CHG: -12.3 %
FEF 25 75 LLN: 0.77
FEF 25 75 POST REF: 167.1 %
FEF 25 75 PRE REF: 190.5 %
FEF 25 75 REF: 2.01
FET100 CHG: 117.4 %
FEV1 CHG: -5.9 %
FEV1 FVC CHG: -2 %
FEV1 FVC LLN: 60
FEV1 FVC POST REF: 112.8 %
FEV1 FVC PRE REF: 115.1 %
FEV1 FVC REF: 75
FEV1 LLN: 1.95
FEV1 POST REF: 113.9 %
FEV1 PRE REF: 121 %
FEV1 REF: 2.81
FRCN2 LLN: 2.73
FRCN2 PRE REF: 81.3 %
FRCN2 REF: 3.72
FVC CHG: -4 %
FVC LLN: 2.73
FVC POST REF: 100.1 %
FVC PRE REF: 104.3 %
FVC REF: 3.78
IVC PRE: 2.96 L
IVC SINGLE BREATH LLN: 2.73
IVC SINGLE BREATH PRE REF: 78.3 %
IVC SINGLE BREATH REF: 3.78
PEF CHG: -33.2 %
PEF LLN: 4.87
PEF POST REF: 73.1 %
PEF PRE REF: 109.5 %
PEF REF: 7.11
POST FEF 25 75: 3.36 L/S
POST FET 100: 6.31 SEC
POST FEV1 FVC: 84.55 %
POST FEV1: 3.2 L
POST FVC: 3.78 L
POST PEF: 5.2 L/S
PRE DLCO: 17.48 ML/(MIN*MMHG)
PRE FEF 25 75: 3.83 L/S
PRE FET 100: 2.9 SEC
PRE FEV1 FVC: 86.26 %
PRE FEV1: 3.4 L
PRE FRC N2: 3.02 L
PRE FVC: 3.94 L
PRE PEF: 7.79 L/S
RVN2 LLN: 2.13
RVN2 PRE REF: 52.8 %
RVN2 PRE: 1.48 L
RVN2 REF: 2.8
RVN2TLCN2 LLN: 35.79
RVN2TLCN2 PRE REF: 61.7 %
RVN2TLCN2 PRE: 27.61 %
RVN2TLCN2 REF: 44.77
TLCN2 LLN: 5.75
TLCN2 PRE REF: 77.7 %
TLCN2 PRE: 5.36 L
TLCN2 REF: 6.9
VA PRE: 4.99 L
VA SINGLE BREATH LLN: 6.75
VA SINGLE BREATH PRE REF: 73.9 %
VA SINGLE BREATH REF: 6.75
VCMAXN2 LLN: 2.73
VCMAXN2 PRE REF: 102.7 %
VCMAXN2 PRE: 3.88 L
VCMAXN2 REF: 3.78

## 2021-09-20 PROCEDURE — 94060 PR EVAL OF BRONCHOSPASM: ICD-10-PCS | Mod: 26,,, | Performed by: INTERNAL MEDICINE

## 2021-09-20 PROCEDURE — 25000242 PHARM REV CODE 250 ALT 637 W/ HCPCS: Performed by: INTERNAL MEDICINE

## 2021-09-20 PROCEDURE — 94729 PR C02/MEMBANE DIFFUSE CAPACITY: ICD-10-PCS | Mod: 26,,, | Performed by: INTERNAL MEDICINE

## 2021-09-20 PROCEDURE — 94727 GAS DIL/WSHOT DETER LNG VOL: CPT

## 2021-09-20 PROCEDURE — 94729 DIFFUSING CAPACITY: CPT | Mod: 26,,, | Performed by: INTERNAL MEDICINE

## 2021-09-20 PROCEDURE — 94010 BREATHING CAPACITY TEST: CPT

## 2021-09-20 PROCEDURE — 94060 EVALUATION OF WHEEZING: CPT | Mod: 26,,, | Performed by: INTERNAL MEDICINE

## 2021-09-20 PROCEDURE — 94729 DIFFUSING CAPACITY: CPT

## 2021-09-20 PROCEDURE — 94727 GAS DIL/WSHOT DETER LNG VOL: CPT | Mod: 26,,, | Performed by: INTERNAL MEDICINE

## 2021-09-20 PROCEDURE — 94727 PR PULM FUNCTION TEST BY GAS: ICD-10-PCS | Mod: 26,,, | Performed by: INTERNAL MEDICINE

## 2021-09-20 RX ORDER — ALBUTEROL SULFATE 2.5 MG/.5ML
2.5 SOLUTION RESPIRATORY (INHALATION) ONCE
Status: COMPLETED | OUTPATIENT
Start: 2021-09-20 | End: 2021-09-20

## 2021-09-20 RX ADMIN — ALBUTEROL SULFATE 2.5 MG: 2.5 SOLUTION RESPIRATORY (INHALATION) at 10:09

## 2021-09-23 ENCOUNTER — OFFICE VISIT (OUTPATIENT)
Dept: NEUROLOGY | Facility: CLINIC | Age: 81
End: 2021-09-23
Payer: MEDICARE

## 2021-09-23 VITALS
HEART RATE: 57 BPM | HEIGHT: 69 IN | DIASTOLIC BLOOD PRESSURE: 67 MMHG | WEIGHT: 158.5 LBS | SYSTOLIC BLOOD PRESSURE: 113 MMHG | BODY MASS INDEX: 23.47 KG/M2

## 2021-09-23 DIAGNOSIS — R41.841 COGNITIVE COMMUNICATION DEFICIT: ICD-10-CM

## 2021-09-23 DIAGNOSIS — G31.84 MILD COGNITIVE IMPAIRMENT: Primary | ICD-10-CM

## 2021-09-23 DIAGNOSIS — Z86.73 HISTORY OF STROKE: ICD-10-CM

## 2021-09-23 PROCEDURE — 1126F AMNT PAIN NOTED NONE PRSNT: CPT | Mod: CPTII,S$GLB,, | Performed by: NEUROLOGICAL SURGERY

## 2021-09-23 PROCEDURE — 1157F ADVNC CARE PLAN IN RCRD: CPT | Mod: CPTII,S$GLB,, | Performed by: NEUROLOGICAL SURGERY

## 2021-09-23 PROCEDURE — 3078F DIAST BP <80 MM HG: CPT | Mod: CPTII,S$GLB,, | Performed by: NEUROLOGICAL SURGERY

## 2021-09-23 PROCEDURE — 3288F PR FALLS RISK ASSESSMENT DOCUMENTED: ICD-10-PCS | Mod: CPTII,S$GLB,, | Performed by: NEUROLOGICAL SURGERY

## 2021-09-23 PROCEDURE — 1157F PR ADVANCE CARE PLAN OR EQUIV PRESENT IN MEDICAL RECORD: ICD-10-PCS | Mod: CPTII,S$GLB,, | Performed by: NEUROLOGICAL SURGERY

## 2021-09-23 PROCEDURE — 99214 OFFICE O/P EST MOD 30 MIN: CPT | Mod: S$GLB,,, | Performed by: NEUROLOGICAL SURGERY

## 2021-09-23 PROCEDURE — 1126F PR PAIN SEVERITY QUANTIFIED, NO PAIN PRESENT: ICD-10-PCS | Mod: CPTII,S$GLB,, | Performed by: NEUROLOGICAL SURGERY

## 2021-09-23 PROCEDURE — 3288F FALL RISK ASSESSMENT DOCD: CPT | Mod: CPTII,S$GLB,, | Performed by: NEUROLOGICAL SURGERY

## 2021-09-23 PROCEDURE — 99214 PR OFFICE/OUTPT VISIT, EST, LEVL IV, 30-39 MIN: ICD-10-PCS | Mod: S$GLB,,, | Performed by: NEUROLOGICAL SURGERY

## 2021-09-23 PROCEDURE — 1101F PT FALLS ASSESS-DOCD LE1/YR: CPT | Mod: CPTII,S$GLB,, | Performed by: NEUROLOGICAL SURGERY

## 2021-09-23 PROCEDURE — 1101F PR PT FALLS ASSESS DOC 0-1 FALLS W/OUT INJ PAST YR: ICD-10-PCS | Mod: CPTII,S$GLB,, | Performed by: NEUROLOGICAL SURGERY

## 2021-09-23 PROCEDURE — 3078F PR MOST RECENT DIASTOLIC BLOOD PRESSURE < 80 MM HG: ICD-10-PCS | Mod: CPTII,S$GLB,, | Performed by: NEUROLOGICAL SURGERY

## 2021-09-23 PROCEDURE — 99999 PR PBB SHADOW E&M-EST. PATIENT-LVL IV: ICD-10-PCS | Mod: PBBFAC,,, | Performed by: NEUROLOGICAL SURGERY

## 2021-09-23 PROCEDURE — 99999 PR PBB SHADOW E&M-EST. PATIENT-LVL IV: CPT | Mod: PBBFAC,,, | Performed by: NEUROLOGICAL SURGERY

## 2021-09-23 PROCEDURE — 3074F SYST BP LT 130 MM HG: CPT | Mod: CPTII,S$GLB,, | Performed by: NEUROLOGICAL SURGERY

## 2021-09-23 PROCEDURE — 3074F PR MOST RECENT SYSTOLIC BLOOD PRESSURE < 130 MM HG: ICD-10-PCS | Mod: CPTII,S$GLB,, | Performed by: NEUROLOGICAL SURGERY

## 2021-09-29 ENCOUNTER — TELEPHONE (OUTPATIENT)
Dept: NEUROLOGY | Facility: CLINIC | Age: 81
End: 2021-09-29

## 2021-10-06 ENCOUNTER — OFFICE VISIT (OUTPATIENT)
Dept: INFECTIOUS DISEASES | Facility: CLINIC | Age: 81
End: 2021-10-06
Payer: MEDICARE

## 2021-10-06 VITALS
DIASTOLIC BLOOD PRESSURE: 76 MMHG | OXYGEN SATURATION: 97 % | HEART RATE: 60 BPM | TEMPERATURE: 98 F | SYSTOLIC BLOOD PRESSURE: 127 MMHG | BODY MASS INDEX: 23.25 KG/M2 | HEIGHT: 69 IN | WEIGHT: 156.94 LBS

## 2021-10-06 DIAGNOSIS — A31.0 MYCOBACTERIUM AVIUM-INTRACELLULARE COMPLEX: Primary | ICD-10-CM

## 2021-10-06 PROCEDURE — 3288F PR FALLS RISK ASSESSMENT DOCUMENTED: ICD-10-PCS | Mod: CPTII,S$GLB,, | Performed by: INTERNAL MEDICINE

## 2021-10-06 PROCEDURE — 3074F PR MOST RECENT SYSTOLIC BLOOD PRESSURE < 130 MM HG: ICD-10-PCS | Mod: CPTII,S$GLB,, | Performed by: INTERNAL MEDICINE

## 2021-10-06 PROCEDURE — 1159F MED LIST DOCD IN RCRD: CPT | Mod: CPTII,S$GLB,, | Performed by: INTERNAL MEDICINE

## 2021-10-06 PROCEDURE — 1100F PR PT FALLS ASSESS DOC 2+ FALLS/FALL W/INJURY/YR: ICD-10-PCS | Mod: CPTII,S$GLB,, | Performed by: INTERNAL MEDICINE

## 2021-10-06 PROCEDURE — 3074F SYST BP LT 130 MM HG: CPT | Mod: CPTII,S$GLB,, | Performed by: INTERNAL MEDICINE

## 2021-10-06 PROCEDURE — 1126F PR PAIN SEVERITY QUANTIFIED, NO PAIN PRESENT: ICD-10-PCS | Mod: CPTII,S$GLB,, | Performed by: INTERNAL MEDICINE

## 2021-10-06 PROCEDURE — 3288F FALL RISK ASSESSMENT DOCD: CPT | Mod: CPTII,S$GLB,, | Performed by: INTERNAL MEDICINE

## 2021-10-06 PROCEDURE — 1159F PR MEDICATION LIST DOCUMENTED IN MEDICAL RECORD: ICD-10-PCS | Mod: CPTII,S$GLB,, | Performed by: INTERNAL MEDICINE

## 2021-10-06 PROCEDURE — 99214 OFFICE O/P EST MOD 30 MIN: CPT | Mod: S$GLB,,, | Performed by: INTERNAL MEDICINE

## 2021-10-06 PROCEDURE — 3078F DIAST BP <80 MM HG: CPT | Mod: CPTII,S$GLB,, | Performed by: INTERNAL MEDICINE

## 2021-10-06 PROCEDURE — 1157F PR ADVANCE CARE PLAN OR EQUIV PRESENT IN MEDICAL RECORD: ICD-10-PCS | Mod: CPTII,S$GLB,, | Performed by: INTERNAL MEDICINE

## 2021-10-06 PROCEDURE — 99999 PR PBB SHADOW E&M-EST. PATIENT-LVL V: CPT | Mod: PBBFAC,,,

## 2021-10-06 PROCEDURE — 1100F PTFALLS ASSESS-DOCD GE2>/YR: CPT | Mod: CPTII,S$GLB,, | Performed by: INTERNAL MEDICINE

## 2021-10-06 PROCEDURE — 1157F ADVNC CARE PLAN IN RCRD: CPT | Mod: CPTII,S$GLB,, | Performed by: INTERNAL MEDICINE

## 2021-10-06 PROCEDURE — 1126F AMNT PAIN NOTED NONE PRSNT: CPT | Mod: CPTII,S$GLB,, | Performed by: INTERNAL MEDICINE

## 2021-10-06 PROCEDURE — 99214 PR OFFICE/OUTPT VISIT, EST, LEVL IV, 30-39 MIN: ICD-10-PCS | Mod: S$GLB,,, | Performed by: INTERNAL MEDICINE

## 2021-10-06 PROCEDURE — 3078F PR MOST RECENT DIASTOLIC BLOOD PRESSURE < 80 MM HG: ICD-10-PCS | Mod: CPTII,S$GLB,, | Performed by: INTERNAL MEDICINE

## 2021-10-06 PROCEDURE — 99999 PR PBB SHADOW E&M-EST. PATIENT-LVL V: ICD-10-PCS | Mod: PBBFAC,,,

## 2021-10-06 RX ORDER — NEBULIZER AND COMPRESSOR
1 EACH MISCELLANEOUS 2 TIMES DAILY
Qty: 1 EACH | Refills: 0 | Status: SHIPPED | OUTPATIENT
Start: 2021-10-06 | End: 2021-10-08 | Stop reason: SDUPTHER

## 2021-10-06 RX ORDER — SODIUM CHLORIDE FOR INHALATION 3 %
4 VIAL, NEBULIZER (ML) INHALATION 2 TIMES DAILY
Qty: 500 ML | Refills: 0 | Status: SHIPPED | OUTPATIENT
Start: 2021-10-06 | End: 2021-11-30

## 2021-10-07 ENCOUNTER — TELEPHONE (OUTPATIENT)
Dept: PULMONOLOGY | Facility: CLINIC | Age: 81
End: 2021-10-07

## 2021-10-07 ENCOUNTER — TELEPHONE (OUTPATIENT)
Dept: INFECTIOUS DISEASES | Facility: CLINIC | Age: 81
End: 2021-10-07

## 2021-10-07 ENCOUNTER — LAB VISIT (OUTPATIENT)
Dept: LAB | Facility: HOSPITAL | Age: 81
End: 2021-10-07
Attending: INTERNAL MEDICINE
Payer: MEDICARE

## 2021-10-07 DIAGNOSIS — A31.0 MYCOBACTERIUM AVIUM-INTRACELLULARE COMPLEX: ICD-10-CM

## 2021-10-07 PROCEDURE — 87116 MYCOBACTERIA CULTURE: CPT | Performed by: INTERNAL MEDICINE

## 2021-10-07 PROCEDURE — 87015 SPECIMEN INFECT AGNT CONCNTJ: CPT | Performed by: INTERNAL MEDICINE

## 2021-10-07 PROCEDURE — 87206 SMEAR FLUORESCENT/ACID STAI: CPT | Performed by: INTERNAL MEDICINE

## 2021-10-07 PROCEDURE — 87149 DNA/RNA DIRECT PROBE: CPT | Performed by: INTERNAL MEDICINE

## 2021-10-07 PROCEDURE — 87118 MYCOBACTERIC IDENTIFICATION: CPT | Mod: 59 | Performed by: INTERNAL MEDICINE

## 2021-10-07 PROCEDURE — 87186 SC STD MICRODIL/AGAR DIL: CPT | Performed by: INTERNAL MEDICINE

## 2021-10-08 ENCOUNTER — TELEPHONE (OUTPATIENT)
Dept: INFECTIOUS DISEASES | Facility: HOSPITAL | Age: 81
End: 2021-10-08

## 2021-10-08 ENCOUNTER — TELEPHONE (OUTPATIENT)
Dept: INFECTIOUS DISEASES | Facility: CLINIC | Age: 81
End: 2021-10-08

## 2021-10-08 ENCOUNTER — HOSPITAL ENCOUNTER (OUTPATIENT)
Dept: RADIOLOGY | Facility: HOSPITAL | Age: 81
Discharge: HOME | End: 2021-10-08
Attending: INTERNAL MEDICINE
Payer: MEDICARE

## 2021-10-08 DIAGNOSIS — A31.0 MYCOBACTERIUM AVIUM-INTRACELLULARE COMPLEX: ICD-10-CM

## 2021-10-08 PROCEDURE — 76705 US ABDOMEN LIMITED: ICD-10-PCS | Mod: 26,,, | Performed by: RADIOLOGY

## 2021-10-08 PROCEDURE — 76705 ECHO EXAM OF ABDOMEN: CPT | Mod: TC

## 2021-10-08 PROCEDURE — 76705 ECHO EXAM OF ABDOMEN: CPT | Mod: 26,,, | Performed by: RADIOLOGY

## 2021-10-08 RX ORDER — NEBULIZER AND COMPRESSOR
1 EACH MISCELLANEOUS 2 TIMES DAILY
Qty: 1 EACH | Refills: 0 | Status: SHIPPED | OUTPATIENT
Start: 2021-10-08

## 2021-10-20 ENCOUNTER — TELEPHONE (OUTPATIENT)
Dept: PULMONOLOGY | Facility: CLINIC | Age: 81
End: 2021-10-20

## 2021-10-28 ENCOUNTER — LAB VISIT (OUTPATIENT)
Dept: LAB | Facility: HOSPITAL | Age: 81
End: 2021-10-28
Attending: INTERNAL MEDICINE
Payer: MEDICARE

## 2021-10-28 DIAGNOSIS — A31.0 MYCOBACTERIUM AVIUM-INTRACELLULARE COMPLEX: ICD-10-CM

## 2021-10-28 PROCEDURE — 87015 SPECIMEN INFECT AGNT CONCNTJ: CPT | Performed by: INTERNAL MEDICINE

## 2021-10-28 PROCEDURE — 87116 MYCOBACTERIA CULTURE: CPT | Performed by: INTERNAL MEDICINE

## 2021-10-28 PROCEDURE — 87118 MYCOBACTERIC IDENTIFICATION: CPT | Performed by: INTERNAL MEDICINE

## 2021-10-28 PROCEDURE — 87206 SMEAR FLUORESCENT/ACID STAI: CPT | Performed by: INTERNAL MEDICINE

## 2021-10-28 PROCEDURE — 87149 DNA/RNA DIRECT PROBE: CPT | Performed by: INTERNAL MEDICINE

## 2021-10-29 ENCOUNTER — PES CALL (OUTPATIENT)
Dept: ADMINISTRATIVE | Facility: CLINIC | Age: 81
End: 2021-10-29
Payer: MEDICARE

## 2021-11-02 ENCOUNTER — HOSPITAL ENCOUNTER (OUTPATIENT)
Dept: RADIOLOGY | Facility: HOSPITAL | Age: 81
Discharge: HOME | End: 2021-11-02
Attending: INTERNAL MEDICINE
Payer: MEDICARE

## 2021-11-02 DIAGNOSIS — A31.0 MYCOBACTERIUM AVIUM-INTRACELLULARE COMPLEX: ICD-10-CM

## 2021-11-02 PROCEDURE — 71250 CT THORAX DX C-: CPT | Mod: 26,,, | Performed by: RADIOLOGY

## 2021-11-02 PROCEDURE — 71250 CT CHEST WITHOUT CONTRAST: ICD-10-PCS | Mod: 26,,, | Performed by: RADIOLOGY

## 2021-11-02 PROCEDURE — 71250 CT THORAX DX C-: CPT | Mod: TC

## 2021-11-12 ENCOUNTER — LAB VISIT (OUTPATIENT)
Dept: LAB | Facility: HOSPITAL | Age: 81
End: 2021-11-12
Attending: STUDENT IN AN ORGANIZED HEALTH CARE EDUCATION/TRAINING PROGRAM
Payer: MEDICARE

## 2021-11-12 DIAGNOSIS — Z51.81 ENCOUNTER FOR THERAPEUTIC DRUG LEVEL MONITORING: ICD-10-CM

## 2021-11-12 DIAGNOSIS — I27.82 OTHER CHRONIC PULMONARY EMBOLISM WITHOUT ACUTE COR PULMONALE: ICD-10-CM

## 2021-11-12 LAB
BASOPHILS # BLD AUTO: 0.05 K/UL (ref 0–0.2)
BASOPHILS NFR BLD: 0.5 % (ref 0–1.9)
DIFFERENTIAL METHOD: ABNORMAL
EOSINOPHIL # BLD AUTO: 0.1 K/UL (ref 0–0.5)
EOSINOPHIL NFR BLD: 1.3 % (ref 0–8)
ERYTHROCYTE [DISTWIDTH] IN BLOOD BY AUTOMATED COUNT: 14.5 % (ref 11.5–14.5)
HCT VFR BLD AUTO: 43.8 % (ref 40–54)
HGB BLD-MCNC: 13.5 G/DL (ref 14–18)
IMM GRANULOCYTES # BLD AUTO: 0.04 K/UL (ref 0–0.04)
IMM GRANULOCYTES NFR BLD AUTO: 0.4 % (ref 0–0.5)
INR PPP: 1 (ref 0.8–1.2)
LYMPHOCYTES # BLD AUTO: 2.4 K/UL (ref 1–4.8)
LYMPHOCYTES NFR BLD: 25.6 % (ref 18–48)
MCH RBC QN AUTO: 27.2 PG (ref 27–31)
MCHC RBC AUTO-ENTMCNC: 30.8 G/DL (ref 32–36)
MCV RBC AUTO: 88 FL (ref 82–98)
MONOCYTES # BLD AUTO: 1.1 K/UL (ref 0.3–1)
MONOCYTES NFR BLD: 11.8 % (ref 4–15)
NEUTROPHILS # BLD AUTO: 5.6 K/UL (ref 1.8–7.7)
NEUTROPHILS NFR BLD: 60.4 % (ref 38–73)
NRBC BLD-RTO: 0 /100 WBC
PLATELET # BLD AUTO: 141 K/UL (ref 150–450)
PMV BLD AUTO: 11.5 FL (ref 9.2–12.9)
PROTHROMBIN TIME: 10.9 SEC (ref 9–12.5)
RBC # BLD AUTO: 4.97 M/UL (ref 4.6–6.2)
WBC # BLD AUTO: 9.23 K/UL (ref 3.9–12.7)

## 2021-11-12 PROCEDURE — 85732 THROMBOPLASTIN TIME PARTIAL: CPT | Performed by: STUDENT IN AN ORGANIZED HEALTH CARE EDUCATION/TRAINING PROGRAM

## 2021-11-12 PROCEDURE — 86147 CARDIOLIPIN ANTIBODY EA IG: CPT | Mod: 59 | Performed by: STUDENT IN AN ORGANIZED HEALTH CARE EDUCATION/TRAINING PROGRAM

## 2021-11-12 PROCEDURE — 85610 PROTHROMBIN TIME: CPT | Performed by: STUDENT IN AN ORGANIZED HEALTH CARE EDUCATION/TRAINING PROGRAM

## 2021-11-12 PROCEDURE — 85597 PHOSPHOLIPID PLTLT NEUTRALIZ: CPT | Performed by: STUDENT IN AN ORGANIZED HEALTH CARE EDUCATION/TRAINING PROGRAM

## 2021-11-12 PROCEDURE — 85670 THROMBIN TIME PLASMA: CPT | Performed by: STUDENT IN AN ORGANIZED HEALTH CARE EDUCATION/TRAINING PROGRAM

## 2021-11-12 PROCEDURE — 85613 RUSSELL VIPER VENOM DILUTED: CPT | Performed by: STUDENT IN AN ORGANIZED HEALTH CARE EDUCATION/TRAINING PROGRAM

## 2021-11-12 PROCEDURE — 85025 COMPLETE CBC W/AUTO DIFF WBC: CPT | Performed by: STUDENT IN AN ORGANIZED HEALTH CARE EDUCATION/TRAINING PROGRAM

## 2021-11-15 LAB
CARDIOLIPIN IGG SER IA-ACNC: <9.4 GPL (ref 0–14.99)
CARDIOLIPIN IGM SER IA-ACNC: 60.6 MPL (ref 0–12.49)

## 2021-11-17 LAB
APTT IMM NP PPP: 50 SEC (ref 32–48)
APTT P HEP NEUT PPP: ABNORMAL SEC (ref 32–48)
CONFIRM APTT STACLOT: ABNORMAL
DRVVT SCREEN TO CONFIRM RATIO: ABNORMAL RATIO
LA 3 SCREEN W REFLEX-IMP: ABNORMAL
LA NT DPL PPP QL: POSITIVE
MIXING DRVVT: ABNORMAL SEC (ref 33–44)
PROTHROMBIN TIME: 13.4 SEC (ref 12–15.5)
REPTILASE TIME: ABNORMAL SEC
SCREEN APTT: 67 SEC (ref 32–48)
SCREEN DRVVT: 44 SEC (ref 33–44)
THROMBIN TIME: 16.6 SEC (ref 14.7–19.5)

## 2021-11-18 ENCOUNTER — LAB VISIT (OUTPATIENT)
Dept: LAB | Facility: HOSPITAL | Age: 81
End: 2021-11-18
Attending: INTERNAL MEDICINE
Payer: MEDICARE

## 2021-11-18 DIAGNOSIS — A31.0 MYCOBACTERIUM AVIUM-INTRACELLULARE COMPLEX: ICD-10-CM

## 2021-11-18 PROCEDURE — 87015 SPECIMEN INFECT AGNT CONCNTJ: CPT | Performed by: INTERNAL MEDICINE

## 2021-11-18 PROCEDURE — 87118 MYCOBACTERIC IDENTIFICATION: CPT | Mod: 59 | Performed by: INTERNAL MEDICINE

## 2021-11-18 PROCEDURE — 87149 DNA/RNA DIRECT PROBE: CPT | Performed by: INTERNAL MEDICINE

## 2021-11-18 PROCEDURE — 87116 MYCOBACTERIA CULTURE: CPT | Performed by: INTERNAL MEDICINE

## 2021-11-18 PROCEDURE — 87206 SMEAR FLUORESCENT/ACID STAI: CPT | Performed by: INTERNAL MEDICINE

## 2021-11-19 ENCOUNTER — OFFICE VISIT (OUTPATIENT)
Dept: HEMATOLOGY/ONCOLOGY | Facility: CLINIC | Age: 81
End: 2021-11-19
Payer: MEDICARE

## 2021-11-19 ENCOUNTER — TELEPHONE (OUTPATIENT)
Dept: HEMATOLOGY/ONCOLOGY | Facility: CLINIC | Age: 81
End: 2021-11-19
Payer: MEDICARE

## 2021-11-19 ENCOUNTER — ANTI-COAG VISIT (OUTPATIENT)
Dept: CARDIOLOGY | Facility: CLINIC | Age: 81
End: 2021-11-19
Payer: MEDICARE

## 2021-11-19 VITALS
WEIGHT: 159.63 LBS | OXYGEN SATURATION: 97 % | HEART RATE: 72 BPM | DIASTOLIC BLOOD PRESSURE: 67 MMHG | SYSTOLIC BLOOD PRESSURE: 111 MMHG | BODY MASS INDEX: 23.64 KG/M2 | HEIGHT: 69 IN

## 2021-11-19 DIAGNOSIS — D68.61 ANTIPHOSPHOLIPID SYNDROME: Primary | ICD-10-CM

## 2021-11-19 DIAGNOSIS — I26.99 OTHER ACUTE PULMONARY EMBOLISM WITHOUT ACUTE COR PULMONALE: ICD-10-CM

## 2021-11-19 DIAGNOSIS — Z79.01 LONG TERM (CURRENT) USE OF ANTICOAGULANTS: ICD-10-CM

## 2021-11-19 PROCEDURE — 1126F AMNT PAIN NOTED NONE PRSNT: CPT | Mod: CPTII,S$GLB,, | Performed by: STUDENT IN AN ORGANIZED HEALTH CARE EDUCATION/TRAINING PROGRAM

## 2021-11-19 PROCEDURE — 99215 OFFICE O/P EST HI 40 MIN: CPT | Mod: S$GLB,,, | Performed by: STUDENT IN AN ORGANIZED HEALTH CARE EDUCATION/TRAINING PROGRAM

## 2021-11-19 PROCEDURE — 3074F PR MOST RECENT SYSTOLIC BLOOD PRESSURE < 130 MM HG: ICD-10-PCS | Mod: CPTII,S$GLB,, | Performed by: STUDENT IN AN ORGANIZED HEALTH CARE EDUCATION/TRAINING PROGRAM

## 2021-11-19 PROCEDURE — 1126F PR PAIN SEVERITY QUANTIFIED, NO PAIN PRESENT: ICD-10-PCS | Mod: CPTII,S$GLB,, | Performed by: STUDENT IN AN ORGANIZED HEALTH CARE EDUCATION/TRAINING PROGRAM

## 2021-11-19 PROCEDURE — 1157F ADVNC CARE PLAN IN RCRD: CPT | Mod: CPTII,S$GLB,, | Performed by: STUDENT IN AN ORGANIZED HEALTH CARE EDUCATION/TRAINING PROGRAM

## 2021-11-19 PROCEDURE — 1100F PR PT FALLS ASSESS DOC 2+ FALLS/FALL W/INJURY/YR: ICD-10-PCS | Mod: CPTII,S$GLB,, | Performed by: STUDENT IN AN ORGANIZED HEALTH CARE EDUCATION/TRAINING PROGRAM

## 2021-11-19 PROCEDURE — 3078F PR MOST RECENT DIASTOLIC BLOOD PRESSURE < 80 MM HG: ICD-10-PCS | Mod: CPTII,S$GLB,, | Performed by: STUDENT IN AN ORGANIZED HEALTH CARE EDUCATION/TRAINING PROGRAM

## 2021-11-19 PROCEDURE — 3078F DIAST BP <80 MM HG: CPT | Mod: CPTII,S$GLB,, | Performed by: STUDENT IN AN ORGANIZED HEALTH CARE EDUCATION/TRAINING PROGRAM

## 2021-11-19 PROCEDURE — 99499 RISK ADDL DX/OHS AUDIT: ICD-10-PCS | Mod: S$GLB,,, | Performed by: STUDENT IN AN ORGANIZED HEALTH CARE EDUCATION/TRAINING PROGRAM

## 2021-11-19 PROCEDURE — 1100F PTFALLS ASSESS-DOCD GE2>/YR: CPT | Mod: CPTII,S$GLB,, | Performed by: STUDENT IN AN ORGANIZED HEALTH CARE EDUCATION/TRAINING PROGRAM

## 2021-11-19 PROCEDURE — 1157F PR ADVANCE CARE PLAN OR EQUIV PRESENT IN MEDICAL RECORD: ICD-10-PCS | Mod: CPTII,S$GLB,, | Performed by: STUDENT IN AN ORGANIZED HEALTH CARE EDUCATION/TRAINING PROGRAM

## 2021-11-19 PROCEDURE — 3288F PR FALLS RISK ASSESSMENT DOCUMENTED: ICD-10-PCS | Mod: CPTII,S$GLB,, | Performed by: STUDENT IN AN ORGANIZED HEALTH CARE EDUCATION/TRAINING PROGRAM

## 2021-11-19 PROCEDURE — 3074F SYST BP LT 130 MM HG: CPT | Mod: CPTII,S$GLB,, | Performed by: STUDENT IN AN ORGANIZED HEALTH CARE EDUCATION/TRAINING PROGRAM

## 2021-11-19 PROCEDURE — 99499 UNLISTED E&M SERVICE: CPT | Mod: S$GLB,,, | Performed by: STUDENT IN AN ORGANIZED HEALTH CARE EDUCATION/TRAINING PROGRAM

## 2021-11-19 PROCEDURE — 99215 PR OFFICE/OUTPT VISIT, EST, LEVL V, 40-54 MIN: ICD-10-PCS | Mod: S$GLB,,, | Performed by: STUDENT IN AN ORGANIZED HEALTH CARE EDUCATION/TRAINING PROGRAM

## 2021-11-19 PROCEDURE — 99999 PR PBB SHADOW E&M-EST. PATIENT-LVL IV: CPT | Mod: PBBFAC,,, | Performed by: STUDENT IN AN ORGANIZED HEALTH CARE EDUCATION/TRAINING PROGRAM

## 2021-11-19 PROCEDURE — 3288F FALL RISK ASSESSMENT DOCD: CPT | Mod: CPTII,S$GLB,, | Performed by: STUDENT IN AN ORGANIZED HEALTH CARE EDUCATION/TRAINING PROGRAM

## 2021-11-19 PROCEDURE — 99999 PR PBB SHADOW E&M-EST. PATIENT-LVL IV: ICD-10-PCS | Mod: PBBFAC,,, | Performed by: STUDENT IN AN ORGANIZED HEALTH CARE EDUCATION/TRAINING PROGRAM

## 2021-11-19 RX ORDER — WARFARIN 3 MG/1
4.5 TABLET ORAL DAILY
Qty: 45 TABLET | Refills: 11 | Status: SHIPPED | OUTPATIENT
Start: 2021-11-19 | End: 2022-03-04 | Stop reason: SDUPTHER

## 2021-11-19 RX ORDER — ENOXAPARIN SODIUM 100 MG/ML
1 INJECTION SUBCUTANEOUS EVERY 12 HOURS
Qty: 9.6 ML | Refills: 0 | Status: SHIPPED | OUTPATIENT
Start: 2021-11-19 | End: 2021-11-26

## 2021-11-23 ENCOUNTER — ANTI-COAG VISIT (OUTPATIENT)
Dept: CARDIOLOGY | Facility: CLINIC | Age: 81
End: 2021-11-23
Payer: MEDICARE

## 2021-11-23 ENCOUNTER — TELEPHONE (OUTPATIENT)
Dept: HEMATOLOGY/ONCOLOGY | Facility: CLINIC | Age: 81
End: 2021-11-23
Payer: MEDICARE

## 2021-11-23 ENCOUNTER — LAB VISIT (OUTPATIENT)
Dept: LAB | Facility: HOSPITAL | Age: 81
End: 2021-11-23
Attending: STUDENT IN AN ORGANIZED HEALTH CARE EDUCATION/TRAINING PROGRAM
Payer: MEDICARE

## 2021-11-23 DIAGNOSIS — D68.61 ANTIPHOSPHOLIPID SYNDROME: Primary | ICD-10-CM

## 2021-11-23 DIAGNOSIS — D68.61 ANTIPHOSPHOLIPID SYNDROME: ICD-10-CM

## 2021-11-23 LAB
INR PPP: 5.05
INR PPP: 5.1 (ref 0.8–1.2)
PROTHROMBIN TIME: 48.6 SEC (ref 9–12.5)

## 2021-11-23 PROCEDURE — 85610 PROTHROMBIN TIME: CPT | Performed by: STUDENT IN AN ORGANIZED HEALTH CARE EDUCATION/TRAINING PROGRAM

## 2021-11-23 PROCEDURE — 93793 PR ANTICOAGULANT MGMT FOR PT TAKING WARFARIN: ICD-10-PCS | Mod: S$GLB,,, | Performed by: PHARMACIST

## 2021-11-23 PROCEDURE — 36415 COLL VENOUS BLD VENIPUNCTURE: CPT | Mod: PO | Performed by: STUDENT IN AN ORGANIZED HEALTH CARE EDUCATION/TRAINING PROGRAM

## 2021-11-23 PROCEDURE — 93793 ANTICOAG MGMT PT WARFARIN: CPT | Mod: S$GLB,,, | Performed by: PHARMACIST

## 2021-11-24 ENCOUNTER — LAB VISIT (OUTPATIENT)
Dept: LAB | Facility: HOSPITAL | Age: 81
End: 2021-11-24
Attending: STUDENT IN AN ORGANIZED HEALTH CARE EDUCATION/TRAINING PROGRAM
Payer: MEDICARE

## 2021-11-24 ENCOUNTER — ANTI-COAG VISIT (OUTPATIENT)
Dept: CARDIOLOGY | Facility: CLINIC | Age: 81
End: 2021-11-24
Payer: MEDICARE

## 2021-11-24 DIAGNOSIS — D68.61 ANTIPHOSPHOLIPID SYNDROME: Primary | ICD-10-CM

## 2021-11-24 DIAGNOSIS — D68.61 ANTIPHOSPHOLIPID SYNDROME: ICD-10-CM

## 2021-11-24 LAB
INR PPP: 2.5 (ref 0.8–1.2)
PROTHROMBIN TIME: 25.2 SEC (ref 9–12.5)

## 2021-11-24 PROCEDURE — 36415 COLL VENOUS BLD VENIPUNCTURE: CPT | Mod: PO | Performed by: STUDENT IN AN ORGANIZED HEALTH CARE EDUCATION/TRAINING PROGRAM

## 2021-11-24 PROCEDURE — 85610 PROTHROMBIN TIME: CPT | Performed by: STUDENT IN AN ORGANIZED HEALTH CARE EDUCATION/TRAINING PROGRAM

## 2021-11-24 PROCEDURE — 93793 ANTICOAG MGMT PT WARFARIN: CPT | Mod: S$GLB,,, | Performed by: PHARMACIST

## 2021-11-24 PROCEDURE — 93793 PR ANTICOAGULANT MGMT FOR PT TAKING WARFARIN: ICD-10-PCS | Mod: S$GLB,,, | Performed by: PHARMACIST

## 2021-11-26 ENCOUNTER — OFFICE VISIT (OUTPATIENT)
Dept: FAMILY MEDICINE | Facility: CLINIC | Age: 81
End: 2021-11-26
Payer: MEDICARE

## 2021-11-26 VITALS
TEMPERATURE: 98 F | WEIGHT: 162.56 LBS | HEART RATE: 59 BPM | SYSTOLIC BLOOD PRESSURE: 100 MMHG | OXYGEN SATURATION: 97 % | BODY MASS INDEX: 24.08 KG/M2 | DIASTOLIC BLOOD PRESSURE: 60 MMHG | HEIGHT: 69 IN

## 2021-11-26 DIAGNOSIS — D68.61 ANTIPHOSPHOLIPID SYNDROME: ICD-10-CM

## 2021-11-26 DIAGNOSIS — R61 NIGHT SWEATS: Primary | ICD-10-CM

## 2021-11-26 PROCEDURE — 1157F PR ADVANCE CARE PLAN OR EQUIV PRESENT IN MEDICAL RECORD: ICD-10-PCS | Mod: CPTII,S$GLB,, | Performed by: NURSE PRACTITIONER

## 2021-11-26 PROCEDURE — 99214 PR OFFICE/OUTPT VISIT, EST, LEVL IV, 30-39 MIN: ICD-10-PCS | Mod: S$GLB,,, | Performed by: NURSE PRACTITIONER

## 2021-11-26 PROCEDURE — 99999 PR PBB SHADOW E&M-EST. PATIENT-LVL V: ICD-10-PCS | Mod: PBBFAC,,, | Performed by: NURSE PRACTITIONER

## 2021-11-26 PROCEDURE — 99214 OFFICE O/P EST MOD 30 MIN: CPT | Mod: S$GLB,,, | Performed by: NURSE PRACTITIONER

## 2021-11-26 PROCEDURE — 99999 PR PBB SHADOW E&M-EST. PATIENT-LVL V: CPT | Mod: PBBFAC,,, | Performed by: NURSE PRACTITIONER

## 2021-11-26 PROCEDURE — 1157F ADVNC CARE PLAN IN RCRD: CPT | Mod: CPTII,S$GLB,, | Performed by: NURSE PRACTITIONER

## 2021-11-29 ENCOUNTER — ANTI-COAG VISIT (OUTPATIENT)
Dept: CARDIOLOGY | Facility: CLINIC | Age: 81
End: 2021-11-29
Payer: MEDICARE

## 2021-11-29 ENCOUNTER — LAB VISIT (OUTPATIENT)
Dept: LAB | Facility: HOSPITAL | Age: 81
End: 2021-11-29
Attending: STUDENT IN AN ORGANIZED HEALTH CARE EDUCATION/TRAINING PROGRAM
Payer: MEDICARE

## 2021-11-29 DIAGNOSIS — D68.61 ANTIPHOSPHOLIPID SYNDROME: ICD-10-CM

## 2021-11-29 DIAGNOSIS — R61 NIGHT SWEATS: ICD-10-CM

## 2021-11-29 DIAGNOSIS — D68.61 ANTIPHOSPHOLIPID SYNDROME: Primary | ICD-10-CM

## 2021-11-29 LAB
ALBUMIN SERPL BCP-MCNC: 4 G/DL (ref 3.5–5.2)
ALP SERPL-CCNC: 87 U/L (ref 55–135)
ALT SERPL W/O P-5'-P-CCNC: 43 U/L (ref 10–44)
ANION GAP SERPL CALC-SCNC: 8 MMOL/L (ref 8–16)
AST SERPL-CCNC: 32 U/L (ref 10–40)
BASOPHILS # BLD AUTO: 0.08 K/UL (ref 0–0.2)
BASOPHILS NFR BLD: 1 % (ref 0–1.9)
BILIRUB SERPL-MCNC: 0.4 MG/DL (ref 0.1–1)
BUN SERPL-MCNC: 10 MG/DL (ref 8–23)
CALCIUM SERPL-MCNC: 10.3 MG/DL (ref 8.7–10.5)
CHLORIDE SERPL-SCNC: 103 MMOL/L (ref 95–110)
CO2 SERPL-SCNC: 30 MMOL/L (ref 23–29)
CREAT SERPL-MCNC: 1 MG/DL (ref 0.5–1.4)
DIFFERENTIAL METHOD: ABNORMAL
EOSINOPHIL # BLD AUTO: 0.1 K/UL (ref 0–0.5)
EOSINOPHIL NFR BLD: 1.6 % (ref 0–8)
ERYTHROCYTE [DISTWIDTH] IN BLOOD BY AUTOMATED COUNT: 14.7 % (ref 11.5–14.5)
EST. GFR  (AFRICAN AMERICAN): >60 ML/MIN/1.73 M^2
EST. GFR  (NON AFRICAN AMERICAN): >60 ML/MIN/1.73 M^2
GLUCOSE SERPL-MCNC: 135 MG/DL (ref 70–110)
HCT VFR BLD AUTO: 44.6 % (ref 40–54)
HGB BLD-MCNC: 13.8 G/DL (ref 14–18)
IMM GRANULOCYTES # BLD AUTO: 0.03 K/UL (ref 0–0.04)
IMM GRANULOCYTES NFR BLD AUTO: 0.4 % (ref 0–0.5)
INR PPP: 4.3 (ref 0.8–1.2)
LYMPHOCYTES # BLD AUTO: 2 K/UL (ref 1–4.8)
LYMPHOCYTES NFR BLD: 25.5 % (ref 18–48)
MCH RBC QN AUTO: 26.6 PG (ref 27–31)
MCHC RBC AUTO-ENTMCNC: 30.9 G/DL (ref 32–36)
MCV RBC AUTO: 86 FL (ref 82–98)
MONOCYTES # BLD AUTO: 0.9 K/UL (ref 0.3–1)
MONOCYTES NFR BLD: 11.4 % (ref 4–15)
NEUTROPHILS # BLD AUTO: 4.8 K/UL (ref 1.8–7.7)
NEUTROPHILS NFR BLD: 60.1 % (ref 38–73)
NRBC BLD-RTO: 0 /100 WBC
PLATELET # BLD AUTO: 234 K/UL (ref 150–450)
PMV BLD AUTO: 12 FL (ref 9.2–12.9)
POTASSIUM SERPL-SCNC: 4.7 MMOL/L (ref 3.5–5.1)
PROT SERPL-MCNC: 8.3 G/DL (ref 6–8.4)
PROTHROMBIN TIME: 41.6 SEC (ref 9–12.5)
RBC # BLD AUTO: 5.19 M/UL (ref 4.6–6.2)
SODIUM SERPL-SCNC: 141 MMOL/L (ref 136–145)
TESTOST SERPL-MCNC: 732 NG/DL (ref 304–1227)
WBC # BLD AUTO: 7.97 K/UL (ref 3.9–12.7)

## 2021-11-29 PROCEDURE — 84403 ASSAY OF TOTAL TESTOSTERONE: CPT | Performed by: NURSE PRACTITIONER

## 2021-11-29 PROCEDURE — 85025 COMPLETE CBC W/AUTO DIFF WBC: CPT | Performed by: NURSE PRACTITIONER

## 2021-11-29 PROCEDURE — 93793 PR ANTICOAGULANT MGMT FOR PT TAKING WARFARIN: ICD-10-PCS | Mod: S$GLB,,,

## 2021-11-29 PROCEDURE — 93793 ANTICOAG MGMT PT WARFARIN: CPT | Mod: S$GLB,,,

## 2021-11-29 PROCEDURE — 80053 COMPREHEN METABOLIC PANEL: CPT | Performed by: NURSE PRACTITIONER

## 2021-11-29 PROCEDURE — 36415 COLL VENOUS BLD VENIPUNCTURE: CPT | Mod: PO | Performed by: STUDENT IN AN ORGANIZED HEALTH CARE EDUCATION/TRAINING PROGRAM

## 2021-11-29 PROCEDURE — 85610 PROTHROMBIN TIME: CPT | Performed by: STUDENT IN AN ORGANIZED HEALTH CARE EDUCATION/TRAINING PROGRAM

## 2021-12-06 ENCOUNTER — TELEPHONE (OUTPATIENT)
Dept: INFECTIOUS DISEASES | Facility: CLINIC | Age: 81
End: 2021-12-06
Payer: MEDICARE

## 2021-12-06 ENCOUNTER — ANTI-COAG VISIT (OUTPATIENT)
Dept: CARDIOLOGY | Facility: CLINIC | Age: 81
End: 2021-12-06
Payer: MEDICARE

## 2021-12-06 ENCOUNTER — LAB VISIT (OUTPATIENT)
Dept: LAB | Facility: HOSPITAL | Age: 81
End: 2021-12-06
Attending: STUDENT IN AN ORGANIZED HEALTH CARE EDUCATION/TRAINING PROGRAM
Payer: MEDICARE

## 2021-12-06 DIAGNOSIS — D68.61 ANTIPHOSPHOLIPID SYNDROME: ICD-10-CM

## 2021-12-06 DIAGNOSIS — A31.0 MYCOBACTERIUM AVIUM-INTRACELLULARE COMPLEX: ICD-10-CM

## 2021-12-06 DIAGNOSIS — D68.61 ANTIPHOSPHOLIPID SYNDROME: Primary | ICD-10-CM

## 2021-12-06 LAB
INR PPP: 2 (ref 0.8–1.2)
PROTHROMBIN TIME: 20.4 SEC (ref 9–12.5)

## 2021-12-06 PROCEDURE — 85610 PROTHROMBIN TIME: CPT | Performed by: STUDENT IN AN ORGANIZED HEALTH CARE EDUCATION/TRAINING PROGRAM

## 2021-12-06 PROCEDURE — 93793 ANTICOAG MGMT PT WARFARIN: CPT | Mod: S$GLB,,,

## 2021-12-06 PROCEDURE — 36415 COLL VENOUS BLD VENIPUNCTURE: CPT | Mod: PO | Performed by: STUDENT IN AN ORGANIZED HEALTH CARE EDUCATION/TRAINING PROGRAM

## 2021-12-06 PROCEDURE — 93793 PR ANTICOAGULANT MGMT FOR PT TAKING WARFARIN: ICD-10-PCS | Mod: S$GLB,,,

## 2021-12-06 RX ORDER — SODIUM CHLORIDE FOR INHALATION 3 %
VIAL, NEBULIZER (ML) INHALATION
Qty: 480 ML | Refills: 11 | Status: CANCELLED | OUTPATIENT
Start: 2021-12-06

## 2021-12-07 ENCOUNTER — OFFICE VISIT (OUTPATIENT)
Dept: FAMILY MEDICINE | Facility: CLINIC | Age: 81
End: 2021-12-07
Payer: MEDICARE

## 2021-12-07 VITALS
WEIGHT: 161.63 LBS | SYSTOLIC BLOOD PRESSURE: 99 MMHG | OXYGEN SATURATION: 99 % | TEMPERATURE: 98 F | BODY MASS INDEX: 23.86 KG/M2 | HEART RATE: 57 BPM | DIASTOLIC BLOOD PRESSURE: 66 MMHG

## 2021-12-07 DIAGNOSIS — F41.9 ANXIETY: ICD-10-CM

## 2021-12-07 DIAGNOSIS — R00.1 BRADYCARDIA: ICD-10-CM

## 2021-12-07 DIAGNOSIS — E11.40 CONTROLLED TYPE 2 DIABETES WITH NEUROPATHY: ICD-10-CM

## 2021-12-07 DIAGNOSIS — I26.99 OTHER ACUTE PULMONARY EMBOLISM WITHOUT ACUTE COR PULMONALE: ICD-10-CM

## 2021-12-07 DIAGNOSIS — E78.5 HYPERLIPIDEMIA ASSOCIATED WITH TYPE 2 DIABETES MELLITUS: ICD-10-CM

## 2021-12-07 DIAGNOSIS — R14.2 FUNCTIONAL BELCHING DISORDER: Primary | ICD-10-CM

## 2021-12-07 DIAGNOSIS — E11.69 HYPERLIPIDEMIA ASSOCIATED WITH TYPE 2 DIABETES MELLITUS: ICD-10-CM

## 2021-12-07 PROCEDURE — 99214 PR OFFICE/OUTPT VISIT, EST, LEVL IV, 30-39 MIN: ICD-10-PCS | Mod: S$GLB,,, | Performed by: NURSE PRACTITIONER

## 2021-12-07 PROCEDURE — 99999 PR PBB SHADOW E&M-EST. PATIENT-LVL IV: ICD-10-PCS | Mod: PBBFAC,,, | Performed by: NURSE PRACTITIONER

## 2021-12-07 PROCEDURE — 99214 OFFICE O/P EST MOD 30 MIN: CPT | Mod: S$GLB,,, | Performed by: NURSE PRACTITIONER

## 2021-12-07 PROCEDURE — 99999 PR PBB SHADOW E&M-EST. PATIENT-LVL IV: CPT | Mod: PBBFAC,,, | Performed by: NURSE PRACTITIONER

## 2021-12-07 PROCEDURE — 1157F PR ADVANCE CARE PLAN OR EQUIV PRESENT IN MEDICAL RECORD: ICD-10-PCS | Mod: CPTII,S$GLB,, | Performed by: NURSE PRACTITIONER

## 2021-12-07 PROCEDURE — 1157F ADVNC CARE PLAN IN RCRD: CPT | Mod: CPTII,S$GLB,, | Performed by: NURSE PRACTITIONER

## 2021-12-09 ENCOUNTER — LAB VISIT (OUTPATIENT)
Dept: LAB | Facility: HOSPITAL | Age: 81
End: 2021-12-09
Attending: INTERNAL MEDICINE
Payer: MEDICARE

## 2021-12-09 DIAGNOSIS — A31.0 MYCOBACTERIUM AVIUM-INTRACELLULARE COMPLEX: ICD-10-CM

## 2021-12-09 PROCEDURE — 87015 SPECIMEN INFECT AGNT CONCNTJ: CPT | Performed by: INTERNAL MEDICINE

## 2021-12-09 PROCEDURE — 87206 SMEAR FLUORESCENT/ACID STAI: CPT | Performed by: INTERNAL MEDICINE

## 2021-12-09 PROCEDURE — 87149 DNA/RNA DIRECT PROBE: CPT | Performed by: INTERNAL MEDICINE

## 2021-12-09 PROCEDURE — 87118 MYCOBACTERIC IDENTIFICATION: CPT | Mod: 59 | Performed by: INTERNAL MEDICINE

## 2021-12-09 PROCEDURE — 87116 MYCOBACTERIA CULTURE: CPT | Performed by: INTERNAL MEDICINE

## 2021-12-13 ENCOUNTER — ANTI-COAG VISIT (OUTPATIENT)
Dept: CARDIOLOGY | Facility: CLINIC | Age: 81
End: 2021-12-13
Payer: MEDICARE

## 2021-12-13 ENCOUNTER — LAB VISIT (OUTPATIENT)
Dept: LAB | Facility: HOSPITAL | Age: 81
End: 2021-12-13
Attending: STUDENT IN AN ORGANIZED HEALTH CARE EDUCATION/TRAINING PROGRAM
Payer: MEDICARE

## 2021-12-13 DIAGNOSIS — D68.61 ANTIPHOSPHOLIPID SYNDROME: ICD-10-CM

## 2021-12-13 DIAGNOSIS — D68.61 ANTIPHOSPHOLIPID SYNDROME: Primary | ICD-10-CM

## 2021-12-13 LAB
INR PPP: 2.9 (ref 0.8–1.2)
PROTHROMBIN TIME: 28.6 SEC (ref 9–12.5)

## 2021-12-13 PROCEDURE — 93793 PR ANTICOAGULANT MGMT FOR PT TAKING WARFARIN: ICD-10-PCS | Mod: S$GLB,,,

## 2021-12-13 PROCEDURE — 36415 COLL VENOUS BLD VENIPUNCTURE: CPT | Mod: PO | Performed by: STUDENT IN AN ORGANIZED HEALTH CARE EDUCATION/TRAINING PROGRAM

## 2021-12-13 PROCEDURE — 85610 PROTHROMBIN TIME: CPT | Performed by: STUDENT IN AN ORGANIZED HEALTH CARE EDUCATION/TRAINING PROGRAM

## 2021-12-13 PROCEDURE — 93793 ANTICOAG MGMT PT WARFARIN: CPT | Mod: S$GLB,,,

## 2021-12-20 ENCOUNTER — LAB VISIT (OUTPATIENT)
Dept: LAB | Facility: HOSPITAL | Age: 81
End: 2021-12-20
Attending: STUDENT IN AN ORGANIZED HEALTH CARE EDUCATION/TRAINING PROGRAM
Payer: MEDICARE

## 2021-12-20 ENCOUNTER — ANTI-COAG VISIT (OUTPATIENT)
Dept: CARDIOLOGY | Facility: CLINIC | Age: 81
End: 2021-12-20
Payer: MEDICARE

## 2021-12-20 DIAGNOSIS — D68.61 ANTIPHOSPHOLIPID SYNDROME: ICD-10-CM

## 2021-12-20 DIAGNOSIS — D68.61 ANTIPHOSPHOLIPID SYNDROME: Primary | ICD-10-CM

## 2021-12-20 LAB
INR PPP: 2.8 (ref 0.8–1.2)
PROTHROMBIN TIME: 28.1 SEC (ref 9–12.5)

## 2021-12-20 PROCEDURE — 85610 PROTHROMBIN TIME: CPT | Performed by: STUDENT IN AN ORGANIZED HEALTH CARE EDUCATION/TRAINING PROGRAM

## 2021-12-20 PROCEDURE — 36415 COLL VENOUS BLD VENIPUNCTURE: CPT | Mod: PO | Performed by: STUDENT IN AN ORGANIZED HEALTH CARE EDUCATION/TRAINING PROGRAM

## 2021-12-20 PROCEDURE — 93793 PR ANTICOAGULANT MGMT FOR PT TAKING WARFARIN: ICD-10-PCS | Mod: S$GLB,,, | Performed by: PHARMACIST

## 2021-12-20 PROCEDURE — 93793 ANTICOAG MGMT PT WARFARIN: CPT | Mod: S$GLB,,, | Performed by: PHARMACIST

## 2021-12-27 ENCOUNTER — TELEPHONE (OUTPATIENT)
Dept: INFECTIOUS DISEASES | Facility: CLINIC | Age: 81
End: 2021-12-27
Payer: MEDICARE

## 2021-12-29 ENCOUNTER — OFFICE VISIT (OUTPATIENT)
Dept: INFECTIOUS DISEASES | Facility: CLINIC | Age: 81
End: 2021-12-29
Payer: MEDICARE

## 2021-12-29 VITALS
DIASTOLIC BLOOD PRESSURE: 77 MMHG | SYSTOLIC BLOOD PRESSURE: 131 MMHG | TEMPERATURE: 97 F | HEART RATE: 73 BPM | HEIGHT: 69 IN | OXYGEN SATURATION: 96 % | WEIGHT: 163.5 LBS | BODY MASS INDEX: 24.22 KG/M2

## 2021-12-29 DIAGNOSIS — I26.99 PULMONARY EMBOLISM, UNSPECIFIED CHRONICITY, UNSPECIFIED PULMONARY EMBOLISM TYPE, UNSPECIFIED WHETHER ACUTE COR PULMONALE PRESENT: ICD-10-CM

## 2021-12-29 DIAGNOSIS — D68.61 ANTIPHOSPHOLIPID SYNDROME: ICD-10-CM

## 2021-12-29 DIAGNOSIS — A31.0 MYCOBACTERIUM AVIUM-INTRACELLULARE COMPLEX: Primary | ICD-10-CM

## 2021-12-29 DIAGNOSIS — J98.4 CAVITARY LUNG DISEASE: ICD-10-CM

## 2021-12-29 DIAGNOSIS — A31.0 MAI (MYCOBACTERIUM AVIUM-INTRACELLULARE): ICD-10-CM

## 2021-12-29 PROCEDURE — 99999 PR PBB SHADOW E&M-EST. PATIENT-LVL V: CPT | Mod: PBBFAC,,,

## 2021-12-29 PROCEDURE — 99214 PR OFFICE/OUTPT VISIT, EST, LEVL IV, 30-39 MIN: ICD-10-PCS | Mod: S$GLB,,, | Performed by: INTERNAL MEDICINE

## 2021-12-29 PROCEDURE — 1157F PR ADVANCE CARE PLAN OR EQUIV PRESENT IN MEDICAL RECORD: ICD-10-PCS | Mod: CPTII,S$GLB,, | Performed by: INTERNAL MEDICINE

## 2021-12-29 PROCEDURE — 3078F DIAST BP <80 MM HG: CPT | Mod: CPTII,S$GLB,, | Performed by: INTERNAL MEDICINE

## 2021-12-29 PROCEDURE — 3075F PR MOST RECENT SYSTOLIC BLOOD PRESS GE 130-139MM HG: ICD-10-PCS | Mod: CPTII,S$GLB,, | Performed by: INTERNAL MEDICINE

## 2021-12-29 PROCEDURE — 3078F PR MOST RECENT DIASTOLIC BLOOD PRESSURE < 80 MM HG: ICD-10-PCS | Mod: CPTII,S$GLB,, | Performed by: INTERNAL MEDICINE

## 2021-12-29 PROCEDURE — 1126F PR PAIN SEVERITY QUANTIFIED, NO PAIN PRESENT: ICD-10-PCS | Mod: CPTII,S$GLB,, | Performed by: INTERNAL MEDICINE

## 2021-12-29 PROCEDURE — 1101F PT FALLS ASSESS-DOCD LE1/YR: CPT | Mod: CPTII,S$GLB,, | Performed by: INTERNAL MEDICINE

## 2021-12-29 PROCEDURE — 99499 RISK ADDL DX/OHS AUDIT: ICD-10-PCS | Mod: S$GLB,,, | Performed by: INTERNAL MEDICINE

## 2021-12-29 PROCEDURE — 3288F PR FALLS RISK ASSESSMENT DOCUMENTED: ICD-10-PCS | Mod: CPTII,S$GLB,, | Performed by: INTERNAL MEDICINE

## 2021-12-29 PROCEDURE — 3075F SYST BP GE 130 - 139MM HG: CPT | Mod: CPTII,S$GLB,, | Performed by: INTERNAL MEDICINE

## 2021-12-29 PROCEDURE — 99214 OFFICE O/P EST MOD 30 MIN: CPT | Mod: S$GLB,,, | Performed by: INTERNAL MEDICINE

## 2021-12-29 PROCEDURE — 1126F AMNT PAIN NOTED NONE PRSNT: CPT | Mod: CPTII,S$GLB,, | Performed by: INTERNAL MEDICINE

## 2021-12-29 PROCEDURE — 99499 UNLISTED E&M SERVICE: CPT | Mod: S$GLB,,, | Performed by: INTERNAL MEDICINE

## 2021-12-29 PROCEDURE — 1101F PR PT FALLS ASSESS DOC 0-1 FALLS W/OUT INJ PAST YR: ICD-10-PCS | Mod: CPTII,S$GLB,, | Performed by: INTERNAL MEDICINE

## 2021-12-29 PROCEDURE — 3288F FALL RISK ASSESSMENT DOCD: CPT | Mod: CPTII,S$GLB,, | Performed by: INTERNAL MEDICINE

## 2021-12-29 PROCEDURE — 99999 PR PBB SHADOW E&M-EST. PATIENT-LVL V: ICD-10-PCS | Mod: PBBFAC,,,

## 2021-12-29 PROCEDURE — 1157F ADVNC CARE PLAN IN RCRD: CPT | Mod: CPTII,S$GLB,, | Performed by: INTERNAL MEDICINE

## 2021-12-29 RX ORDER — AZITHROMYCIN 500 MG/1
500 TABLET, FILM COATED ORAL DAILY
Qty: 30 TABLET | Refills: 5 | Status: SHIPPED | OUTPATIENT
Start: 2021-12-29 | End: 2022-01-24 | Stop reason: SDUPTHER

## 2021-12-29 RX ORDER — INFLUENZA A VIRUS A/VICTORIA/2570/2019 IVR-215 (H1N1) ANTIGEN (FORMALDEHYDE INACTIVATED), INFLUENZA A VIRUS A/TASMANIA/503/2020 IVR-221 (H3N2) ANTIGEN (FORMALDEHYDE INACTIVATED), INFLUENZA B VIRUS B/PHUKET/3073/2013 ANTIGEN (FORMALDEHYDE INACTIVATED), AND INFLUENZA B VIRUS B/WASHINGTON/02/2019 ANTIGEN (FORMALDEHYDE INACTIVATED) 60; 60; 60; 60 UG/.7ML; UG/.7ML; UG/.7ML; UG/.7ML
INJECTION, SUSPENSION INTRAMUSCULAR
COMMUNITY
Start: 2021-11-05 | End: 2022-03-03

## 2021-12-29 RX ORDER — ETHAMBUTOL HYDROCHLORIDE 400 MG/1
800 TABLET, FILM COATED ORAL DAILY
Qty: 60 TABLET | Refills: 5 | Status: SHIPPED | OUTPATIENT
Start: 2021-12-29 | End: 2022-01-24 | Stop reason: SDUPTHER

## 2021-12-29 RX ORDER — RIFAMPIN 300 MG/1
600 CAPSULE ORAL DAILY
Qty: 60 CAPSULE | Refills: 5 | Status: SHIPPED | OUTPATIENT
Start: 2021-12-29 | End: 2022-01-24

## 2021-12-30 ENCOUNTER — TELEPHONE (OUTPATIENT)
Dept: INFECTIOUS DISEASES | Facility: CLINIC | Age: 81
End: 2021-12-30
Payer: MEDICARE

## 2021-12-30 ENCOUNTER — LAB VISIT (OUTPATIENT)
Dept: LAB | Facility: HOSPITAL | Age: 81
End: 2021-12-30
Attending: INTERNAL MEDICINE
Payer: MEDICARE

## 2021-12-30 DIAGNOSIS — A31.0 MYCOBACTERIUM AVIUM-INTRACELLULARE COMPLEX: ICD-10-CM

## 2021-12-30 PROCEDURE — 87206 SMEAR FLUORESCENT/ACID STAI: CPT | Performed by: INTERNAL MEDICINE

## 2021-12-30 PROCEDURE — 87015 SPECIMEN INFECT AGNT CONCNTJ: CPT | Performed by: INTERNAL MEDICINE

## 2021-12-30 PROCEDURE — 87149 DNA/RNA DIRECT PROBE: CPT | Performed by: INTERNAL MEDICINE

## 2021-12-30 PROCEDURE — 87116 MYCOBACTERIA CULTURE: CPT | Performed by: INTERNAL MEDICINE

## 2021-12-30 PROCEDURE — 87118 MYCOBACTERIC IDENTIFICATION: CPT | Mod: 59 | Performed by: INTERNAL MEDICINE

## 2022-01-03 ENCOUNTER — ANTI-COAG VISIT (OUTPATIENT)
Dept: CARDIOLOGY | Facility: CLINIC | Age: 82
End: 2022-01-03
Payer: MEDICARE

## 2022-01-03 ENCOUNTER — OFFICE VISIT (OUTPATIENT)
Dept: ORTHOPEDICS | Facility: CLINIC | Age: 82
End: 2022-01-03
Payer: MEDICARE

## 2022-01-03 ENCOUNTER — LAB VISIT (OUTPATIENT)
Dept: LAB | Facility: HOSPITAL | Age: 82
End: 2022-01-03
Attending: STUDENT IN AN ORGANIZED HEALTH CARE EDUCATION/TRAINING PROGRAM
Payer: MEDICARE

## 2022-01-03 VITALS
HEIGHT: 69 IN | RESPIRATION RATE: 18 BRPM | HEART RATE: 53 BPM | BODY MASS INDEX: 23.77 KG/M2 | OXYGEN SATURATION: 99 % | DIASTOLIC BLOOD PRESSURE: 70 MMHG | SYSTOLIC BLOOD PRESSURE: 120 MMHG | WEIGHT: 160.5 LBS

## 2022-01-03 DIAGNOSIS — M16.12 PRIMARY OSTEOARTHRITIS OF LEFT HIP: Primary | ICD-10-CM

## 2022-01-03 DIAGNOSIS — M79.605 PAIN OF LEFT LOWER EXTREMITY: Primary | ICD-10-CM

## 2022-01-03 DIAGNOSIS — D68.61 ANTIPHOSPHOLIPID SYNDROME: Primary | ICD-10-CM

## 2022-01-03 DIAGNOSIS — D68.61 ANTIPHOSPHOLIPID SYNDROME: ICD-10-CM

## 2022-01-03 LAB
INR PPP: 3.5 (ref 0.8–1.2)
PROTHROMBIN TIME: 35.5 SEC (ref 9–12.5)

## 2022-01-03 PROCEDURE — 93793 ANTICOAG MGMT PT WARFARIN: CPT | Mod: S$GLB,,,

## 2022-01-03 PROCEDURE — 3078F PR MOST RECENT DIASTOLIC BLOOD PRESSURE < 80 MM HG: ICD-10-PCS | Mod: CPTII,S$GLB,, | Performed by: ORTHOPAEDIC SURGERY

## 2022-01-03 PROCEDURE — 1125F PR PAIN SEVERITY QUANTIFIED, PAIN PRESENT: ICD-10-PCS | Mod: CPTII,S$GLB,, | Performed by: ORTHOPAEDIC SURGERY

## 2022-01-03 PROCEDURE — 1157F ADVNC CARE PLAN IN RCRD: CPT | Mod: CPTII,S$GLB,, | Performed by: ORTHOPAEDIC SURGERY

## 2022-01-03 PROCEDURE — 93793 PR ANTICOAGULANT MGMT FOR PT TAKING WARFARIN: ICD-10-PCS | Mod: S$GLB,,,

## 2022-01-03 PROCEDURE — 99999 PR PBB SHADOW E&M-EST. PATIENT-LVL IV: ICD-10-PCS | Mod: PBBFAC,,, | Performed by: ORTHOPAEDIC SURGERY

## 2022-01-03 PROCEDURE — 1159F MED LIST DOCD IN RCRD: CPT | Mod: CPTII,S$GLB,, | Performed by: ORTHOPAEDIC SURGERY

## 2022-01-03 PROCEDURE — 1157F PR ADVANCE CARE PLAN OR EQUIV PRESENT IN MEDICAL RECORD: ICD-10-PCS | Mod: CPTII,S$GLB,, | Performed by: ORTHOPAEDIC SURGERY

## 2022-01-03 PROCEDURE — 99203 OFFICE O/P NEW LOW 30 MIN: CPT | Mod: S$GLB,,, | Performed by: ORTHOPAEDIC SURGERY

## 2022-01-03 PROCEDURE — 99203 PR OFFICE/OUTPT VISIT, NEW, LEVL III, 30-44 MIN: ICD-10-PCS | Mod: S$GLB,,, | Performed by: ORTHOPAEDIC SURGERY

## 2022-01-03 PROCEDURE — 99999 PR PBB SHADOW E&M-EST. PATIENT-LVL IV: CPT | Mod: PBBFAC,,, | Performed by: ORTHOPAEDIC SURGERY

## 2022-01-03 PROCEDURE — 3074F SYST BP LT 130 MM HG: CPT | Mod: CPTII,S$GLB,, | Performed by: ORTHOPAEDIC SURGERY

## 2022-01-03 PROCEDURE — 3078F DIAST BP <80 MM HG: CPT | Mod: CPTII,S$GLB,, | Performed by: ORTHOPAEDIC SURGERY

## 2022-01-03 PROCEDURE — 85610 PROTHROMBIN TIME: CPT | Performed by: STUDENT IN AN ORGANIZED HEALTH CARE EDUCATION/TRAINING PROGRAM

## 2022-01-03 PROCEDURE — 36415 COLL VENOUS BLD VENIPUNCTURE: CPT | Mod: PO | Performed by: STUDENT IN AN ORGANIZED HEALTH CARE EDUCATION/TRAINING PROGRAM

## 2022-01-03 PROCEDURE — 1125F AMNT PAIN NOTED PAIN PRSNT: CPT | Mod: CPTII,S$GLB,, | Performed by: ORTHOPAEDIC SURGERY

## 2022-01-03 PROCEDURE — 3074F PR MOST RECENT SYSTOLIC BLOOD PRESSURE < 130 MM HG: ICD-10-PCS | Mod: CPTII,S$GLB,, | Performed by: ORTHOPAEDIC SURGERY

## 2022-01-03 PROCEDURE — 1159F PR MEDICATION LIST DOCUMENTED IN MEDICAL RECORD: ICD-10-PCS | Mod: CPTII,S$GLB,, | Performed by: ORTHOPAEDIC SURGERY

## 2022-01-03 NOTE — PROGRESS NOTES
NEW PATIENT ORTHOPAEDIC: HIP    PRIMARY CARE PHYSICIAN: Portia Ferreira MD   REFERRING PROVIDER: Aaareferral Self  No address on file     ASSESSMENT & PLAN:    Impression:  Left Hip mild osteoarthritis, primary    Follow Up Plan: PRN     Non operative care:    Diego Gunter has physical exam evidence of above and wishes to pursue an non-operative care. I am recommending the following:  Tylenol and observation.  Patient is not a candidate for anti-inflammatory use with being on therapeutic warfarin.  I think this is likely an arthritis flare however he has this acute severe pain associated with a fall approximately a week ago with a interval of no pain and on my differential would be some type of occult hip fracture however is able to weightbear concurrently and is not significantly debilitated or need assistive devices to walk so I think the likelihood of this is not high it is rather arthritis.  I advised him to modify his activities and give it a few days if his pain dramatically worsens where he can ambulate on it I advised him to call me to obtain an MRI at that time to rule out any occult fracture.    The patient has been ordered:  None    CONSULTS:   None    ACTIVE PROBLEM LIST  Patient Active Problem List   Diagnosis    History of prostate cancer    Hx of colonic polyps    Controlled type 2 diabetes with neuropathy    Allergic rhinitis    Anxiety    Hyperlipidemia associated with type 2 diabetes mellitus    Dysphagia, oropharyngeal    Atherosclerosis of aorta    Centrilobular emphysema    Cavitary lung disease    HILLARY (mycobacterium avium-intracellulare)    Mild cognitive impairment    Cognitive communication deficit    Pulmonary embolism    Hypertension associated with type 2 diabetes mellitus    Bradycardia    Pulmonary embolism, unspecified chronicity, unspecified pulmonary embolism type, unspecified whether acute cor pulmonale present    Mild pulmonary hypertension     Hypercoagulable state    Positive cardiolipin antibodies    Antiphospholipid syndrome    Functional belching disorder           SUBJECTIVE    CHIEF COMPLAINT: Hip Pain    HPI:   Diego Gunter is a 81 y.o. male here for evaluation and management of left leg pain. There is a specific incident that brought about this pain, he fell one week ago after tripping on step.  Had laceration to knee and forearm. he has had progressive problems with the hip(s) starting 1 weeks ago and is progressing for last day which is now interfering with activities which include: walking, functional household ADL's, exercise and getting in and out of a car    Currently the pain in the joint is mild with activity.  The pain is intermittent and is located in groin and thigh.  The pain is described as aching, severe and sharp. Relieving factors include rest and repositioning. no associated Catching, Clicking and Popping.       Diego Gunter has no additional complaints.     PROGRESSIVE SYMPTOMS:  Pain impacting sleep  Pain worsened by weight bearing    FUNCTIONAL STATUS:   Walk a block or two on level ground     PREVIOUS TREATMENTS:  Medical: None  Physical Therapy: None  Previous Orthopaedic Surgery: None    REVIEW OF SYSTEMS:  PAIN ASSESSMENT:  See HPI.  MUSCULOSKELETAL: See HPI.  OTHER 10 point review of systems is negative except as stated in HPI above    PAST MEDICAL HISTORY   has a past medical history of Antiphospholipid syndrome (11/19/2021), Dysphagia, colonic polyps, Hyperlipidemia LDL goal <100, Overweight(278.02), Prostate cancer (september 2011), and Type II or unspecified type diabetes mellitus without mention of complication, not stated as uncontrolled.     PAST SURGICAL HISTORY   has a past surgical history that includes Prostatectomy; Cataract extraction, bilateral (2014); and Bronchoscopy (N/A, 10/15/2018).     FAMILY HISTORY  family history includes Colon cancer in his mother; Dementia in his brother; Diabetes in  his brother, brother, maternal aunt, mother, sister, and unknown relative; Heart disease in his father; Lung cancer in his brother; Mental illness in his sister; Myasthenia gravis in his brother; Other in his brother; Parkinsonism in his brother; Pulmonary fibrosis in his brother.     SOCIAL HISTORY   reports that he quit smoking about 59 years ago. He has a 1.25 pack-year smoking history. He has never used smokeless tobacco. He reports current alcohol use. He reports that he does not use drugs.     ALLERGIES   Review of patient's allergies indicates:  No Known Allergies     MEDICATIONS   Current Outpatient Medications on File Prior to Visit   Medication Sig Dispense Refill    albuterol (PROVENTIL/VENTOLIN HFA) 90 mcg/actuation inhaler INHALE 2 PUFFS BY MOUTH EVERY 6 HOURS AS NEEDED FOR WHEEZING 9 g 0    ascorbic acid, vitamin C, (VITAMIN C) 1000 MG tablet Take 1,000 mg by mouth once daily.      aspirin (ECOTRIN) 81 MG EC tablet Take 81 mg by mouth.      azithromycin (ZITHROMAX) 500 MG tablet Take 1 tablet (500 mg total) by mouth once daily. 30 tablet 5    b complex vitamins capsule Take 1 capsule by mouth once daily.      ethambutoL (MYAMBUTOL) 400 MG Tab Take 2 tablets (800 mg total) by mouth once daily. 60 tablet 5    FLUZONE HIGHDOSE QUAD 21-22  mcg/0.7 mL Syrg       mucus clearing device (AEROBIKA OSCILLATING PEP SYSTM) by Misc.(Non-Drug; Combo Route) route 2 (two) times a day. 1 Device 0    nebulizer and compressor (OMBRA COMPRESSOR SYSTEM) Marcy use to administer nebulized medication as directed 1 each 0    omega 3-dha-epa-fish oil 1,000 (120-180) mg Cap Take 1 capsule by mouth once daily.      omega-3 fatty acids 1,000 mg Cap Take 2 g by mouth.      omeprazole 20 mg TbEC 1/2 tablet daily in am as needed. 45 each 0    promethazine-dextromethorphan (PROMETHAZINE-DM) 6.25-15 mg/5 mL Syrp Take 10-15ml by mouth every 8 hours as needed for coughing 240 mL 0    pulse oximeter (PULSE OXIMETER)  "device by Apply Externally route 2 (two) times a day. Use twice daily at 8 AM and 3 PM and record the value in MyChart as directed. 1 each 0    pulse oximeter (PULSE OXIMETER) device by Apply Externally route 2 (two) times a day. Use twice daily at 8 AM and 3 PM and record the value in MyChart as directed. 1 each 0    rifAMpin (RIFADIN) 300 MG capsule Take 2 capsules (600 mg total) by mouth once daily. 60 capsule 5    sodium chloride 3% 3 % nebulizer solution USE 4 ML VIAL IN NEBULIZER  TWICE DAILY 480 mL 0    warfarin (COUMADIN) 3 MG tablet Take 1.5 tablets (4.5 mg total) by mouth Daily. As directed by coumadin clinic 45 tablet 11    pravastatin (PRAVACHOL) 10 MG tablet Take 1 tablet by mouth once daily (Patient not taking: Reported on 1/3/2022) 90 tablet 1     Current Facility-Administered Medications on File Prior to Visit   Medication Dose Route Frequency Provider Last Rate Last Admin    acetaminophen tablet 650 mg  650 mg Oral Once PRN Joaquín Pemberton MD        albuterol inhaler 2 puff  2 puff Inhalation Q20 Min PRN Joaquín Pemberton MD        ondansetron disintegrating tablet 4 mg  4 mg Oral Once PRN Joaquín Pemberton MD        sodium chloride 0.9% 500 mL flush bag   Intravenous PRN Joaquín Pemberton MD        sodium chloride 0.9% flush 10 mL  10 mL Intravenous PRN Joaquín Pemberton MD              PHYSICAL EXAM   height is 5' 9" (1.753 m) and weight is 72.8 kg (160 lb 8 oz). His blood pressure is 120/70 and his pulse is 53 (abnormal). His respiration is 18 and oxygen saturation is 99%.   Body mass index is 23.7 kg/m².      All other systems deferred.  GENERAL:  No acute distress  HABITUS: Normal  GAIT: Antalgic  SKIN: Normal     HIP EXAM:    left:   ROM:   Flexion: 120 degrees    Internal Rotation: 30 degrees    External Rotation: 30 degrees    Abduction: 45 degrees    Adduction: 20 degrees  No apparent leg length discrepancy    Palpation: no tenderness over greater trochanter, SI joint, " ilioposas, IT band, gluteal musculature   Pain is reproduced with IR, Not ER of the hip  Strength: 5/5 hip flexion, abduction, knee flexion and extension   Straight leg raise: Negative   Neurovascular Status: Sensation intact to light touch in Sural, saphenous, SPN, DPN, Tibial nerve distribution  2+ pulse DP/PT, normal capillary refill, foot has normal warmth    DATA:  Diagnostic tests reviewed for today's visit:     Femur radiographs appears normal. No evidence of fracture, osseous abnormalities. Mild degenerative changes of hip and knee as evidenced by joint space narrowing and mild spurring.

## 2022-01-04 NOTE — PROGRESS NOTES
INR high. Pt just started DDIs with new MAC regimen (rifampin, ethambutol, azithromycin) on 1/2. So, we are not seeing effect of potential interactions with this INR. Rifampin DDI will be delayed for 1-2 weeks then we will need to significantly increase dose. We may see effect of azithromycin first but will be short-lived when rifampin DDI starts. INR is likely high due to trauma/pain from noted fall last week per ortho appt yesterday. Decrease dose for today and tomorrow then resume current dose. Recheck INR in 1 week to assess for interactions

## 2022-01-04 NOTE — PROGRESS NOTES
INFECTIOUS DISEASE  CLINIC    Subjective:      Chief Complaint: cavitary lung lesion    History of Present Illness:    Patient Diego Gunter is a 81 y.o. male with h/o emphysema and cavitary MAC infection and antiphospholipid antibody syndrome on coumadin seen in clinic for MAC f/u. Continues to have productive cough (for years), worse in am. Feels exhausted all the time. Not on O2. Independent and ambulatory. Exercises on eliptical. Takes prilosec for reflux. Quit smoking 57 y ago. Since last appt, started on INH hypertonic saline and aerobika. Using bid. Pt in clinic with wife and son. Interested in starting MAC treatment, but nervous about side effects.     Had bronch 10/2018 for work up of lung mass    BRONCHIAL WASH CYTOLOGY:  NEGATIVE EXAM-NO NEOPLASIA IDENTIFIED  NO ORGANISMS IDENTIFIED WITH A GMS STAIN  THE CONTROL STAINED APPROPRIATEL      Organism     MYCOBACTERIUM AVIUM COMPLEX   Antibiotic    TED (mcg/mL)  Interpretation   ----------------------------------------------------------   Moxifloxacin             2       I   Clarithromycin           1       S   Amikacin                64   Streptomycin           >64   Linezolid               64       R   Ethambutol              16   Rifampin                 8   Rifabutin           <=0.25       AFB cultures 10/7/21, 10/28/21, 11/18/21, 12/9/21 all positive MAC    CT 11/2021  1. Continued evolution of previously described right upper lobe cavitary lesion measuring approximately 3.5 x 4.3 x 5.7-cm (previously 3.3 x 4.4 x 5.1-cm). Similar to minimal interval decrease in the amount of surrounding wall thickness, consolidation and surrounding scattered small consolidative multifocal airspace opacities.  Mediastinal lymphadenopathy, not significantly changed.  2. Symmetric moderate-severe centrilobular emphysematous changes with basoapical gradient, not significantly changed.      Review of Symptoms:  Constitutional: Denies fevers, chills, or weakness.  ENT:  Denies dysphagia, nasal discharge, ear pain or discharge.  Cardiovascular: Denies chest pain, palpitations, orthopnea, or claudication.  Respiratory: + cough, as per hpi  GI: Denies nausea/vomitting, hematochezia, melena, abd pain, or changes in appetite.  : Denies dysuria, incontinence, or hematuria.  Musculoskeletal: Denies joint pain or myalgias.  Skin/breast: Denies rashes, lumps, lesions, or discharge.  Neurologic: Denies headache, dizziness, vertigo, or paresthesias.    Past Medical History:   Diagnosis Date    Antiphospholipid syndrome 11/19/2021    Dysphagia     Hx of colonic polyps     followed by GI. Dr. Pham    Hyperlipidemia LDL goal <100     Overweight(278.02)     Prostate cancer september 2011    followed by urology, Dr. Rogers    Type II or unspecified type diabetes mellitus without mention of complication, not stated as uncontrolled     diet controlled       Past Surgical History:   Procedure Laterality Date    BRONCHOSCOPY N/A 10/15/2018    Procedure: BRONCHOSCOPY;  Surgeon: [facility];  Location: [facility] OR [address] [place] [address];  Service: Anesthesiology;  Laterality: N/A;    CATARACT EXTRACTION, BILATERAL  2014    PROSTATECTOMY         Family History   Problem Relation Age of Onset    Colon cancer Mother     Diabetes Mother     Heart disease Father     Mental illness Sister     Diabetes Sister     Other Brother         polio as a child    Pulmonary fibrosis Brother     Diabetes Unknown     Diabetes Brother     Myasthenia gravis Brother     Parkinsonism Brother     Diabetes Brother     Dementia Brother     Lung cancer Brother         smoker    Diabetes Maternal Aunt        Social History     Socioeconomic History    Marital status:     Number of children: 2   Occupational History    Occupation: tool    Tobacco Use    Smoking status: Former Smoker     Packs/day: 0.25     Years: 5.00     Pack years: 1.25     Quit date: 10/2/1962     Years since  quittin.2    Smokeless tobacco: Never Used    Tobacco comment: quit age 23   Substance and Sexual Activity    Alcohol use: Yes     Comment: beer occasionally    Drug use: No    Sexual activity: Yes     Partners: Female       Review of patient's allergies indicates:  No Known Allergies    Scheduled Meds:  Continuous Infusions:  PRN Meds:.acetaminophen, albuterol, ondansetron, sodium chloride 0.9% flush bag IVPB, sodium chloride 0.9%          Objective:   VS (24h):   Vitals:    21 1013   BP: 131/77   Pulse: 73   Temp: 97.3 °F (36.3 °C)     General: Afebrile, alert, comfortable, no acute distress.   HEENT: TERELL. EOMI, no scleral icterus.   Pulmonary: Non labored  Cardiac: normal S1 & S2 w/o rubs/murmurs/gallops.   Abdominal: Non-tender, non-distended.  Extremities: Moves all extremities x 4. No peripheral edema.   Skin: No jaundice, rashes, or visible lesions.   Neurological:  Alert and oriented x 4.     Labs:    Glucose   Date Value Ref Range Status   2021 135 (H) 70 - 110 mg/dL Final   2021 116 (H) 70 - 110 mg/dL Final   2021 145 (H) 70 - 110 mg/dL Final       Calcium   Date Value Ref Range Status   2021 10.3 8.7 - 10.5 mg/dL Final   2021 9.5 8.7 - 10.5 mg/dL Final   2021 9.7 8.7 - 10.5 mg/dL Final       Albumin   Date Value Ref Range Status   2021 4.0 3.5 - 5.2 g/dL Final   2021 3.8 3.5 - 5.2 g/dL Final   2021 3.7 3.5 - 5.2 g/dL Final       Total Protein   Date Value Ref Range Status   2021 8.3 6.0 - 8.4 g/dL Final   2021 7.7 6.0 - 8.4 g/dL Final   2021 7.8 6.0 - 8.4 g/dL Final       Sodium   Date Value Ref Range Status   2021 141 136 - 145 mmol/L Final   2021 140 136 - 145 mmol/L Final   2021 140 136 - 145 mmol/L Final       Potassium   Date Value Ref Range Status   2021 4.7 3.5 - 5.1 mmol/L Final   2021 4.1 3.5 - 5.1 mmol/L Final   2021 4.3 3.5 - 5.1 mmol/L Final       CO2   Date Value Ref  Range Status   11/29/2021 30 (H) 23 - 29 mmol/L Final   09/08/2021 27 23 - 29 mmol/L Final   08/27/2021 29 23 - 29 mmol/L Final       Chloride   Date Value Ref Range Status   11/29/2021 103 95 - 110 mmol/L Final   09/08/2021 104 95 - 110 mmol/L Final   08/27/2021 103 95 - 110 mmol/L Final       BUN   Date Value Ref Range Status   11/29/2021 10 8 - 23 mg/dL Final   09/08/2021 7 (L) 8 - 23 mg/dL Final   08/27/2021 6 (L) 8 - 23 mg/dL Final       Creatinine   Date Value Ref Range Status   11/29/2021 1.0 0.5 - 1.4 mg/dL Final   09/08/2021 0.8 0.5 - 1.4 mg/dL Final   08/27/2021 0.9 0.5 - 1.4 mg/dL Final       Alkaline Phosphatase   Date Value Ref Range Status   11/29/2021 87 55 - 135 U/L Final   09/08/2021 66 55 - 135 U/L Final   08/27/2021 81 55 - 135 U/L Final       ALT   Date Value Ref Range Status   11/29/2021 43 10 - 44 U/L Final   09/08/2021 14 10 - 44 U/L Final   08/27/2021 17 10 - 44 U/L Final       AST   Date Value Ref Range Status   11/29/2021 32 10 - 40 U/L Final   09/08/2021 19 10 - 40 U/L Final   08/27/2021 20 10 - 40 U/L Final       Total Bilirubin   Date Value Ref Range Status   11/29/2021 0.4 0.1 - 1.0 mg/dL Final     Comment:     For infants and newborns, interpretation of results should be based  on gestational age, weight and in agreement with clinical  observations.    Premature Infant recommended reference ranges:  Up to 24 hours.............<8.0 mg/dL  Up to 48 hours............<12.0 mg/dL  3-5 days..................<15.0 mg/dL  6-29 days.................<15.0 mg/dL     09/08/2021 0.7 0.1 - 1.0 mg/dL Final     Comment:     For infants and newborns, interpretation of results should be based  on gestational age, weight and in agreement with clinical  observations.    Premature Infant recommended reference ranges:  Up to 24 hours.............<8.0 mg/dL  Up to 48 hours............<12.0 mg/dL  3-5 days..................<15.0 mg/dL  6-29 days.................<15.0 mg/dL     08/27/2021 0.5 0.1 - 1.0 mg/dL  Final     Comment:     For infants and newborns, interpretation of results should be based  on gestational age, weight and in agreement with clinical  observations.    Premature Infant recommended reference ranges:  Up to 24 hours.............<8.0 mg/dL  Up to 48 hours............<12.0 mg/dL  3-5 days..................<15.0 mg/dL  6-29 days.................<15.0 mg/dL         WBC   Date Value Ref Range Status   11/29/2021 7.97 3.90 - 12.70 K/uL Final   11/12/2021 9.23 3.90 - 12.70 K/uL Final   02/04/2021 7.48 3.90 - 12.70 K/uL Final       Hemoglobin   Date Value Ref Range Status   11/29/2021 13.8 (L) 14.0 - 18.0 g/dL Final   11/12/2021 13.5 (L) 14.0 - 18.0 g/dL Final   02/04/2021 13.6 (L) 14.0 - 18.0 g/dL Final       Hematocrit   Date Value Ref Range Status   11/29/2021 44.6 40.0 - 54.0 % Final   11/12/2021 43.8 40.0 - 54.0 % Final   02/04/2021 41.7 40.0 - 54.0 % Final       MCV   Date Value Ref Range Status   11/29/2021 86 82 - 98 fL Final   11/12/2021 88 82 - 98 fL Final   02/04/2021 86 82 - 98 fL Final       Platelets   Date Value Ref Range Status   11/29/2021 234 150 - 450 K/uL Final   11/12/2021 141 (L) 150 - 450 K/uL Final   02/04/2021 305 150 - 350 K/uL Final       Lab Results   Component Value Date    CHOL 127 02/25/2021    CHOL 144 02/26/2020    CHOL 133 02/14/2019       Lab Results   Component Value Date    HDL 41 02/25/2021    HDL 39 (L) 02/26/2020    HDL 42 02/14/2019       Lab Results   Component Value Date    LDLCALC 67.4 02/25/2021    LDLCALC 87.4 02/26/2020    LDLCALC 74.6 02/14/2019       Lab Results   Component Value Date    TRIG 93 02/25/2021    TRIG 88 02/26/2020    TRIG 82 02/14/2019       Lab Results   Component Value Date    CHOLHDL 32.3 02/25/2021    CHOLHDL 27.1 02/26/2020    CHOLHDL 31.6 02/14/2019       RPR   Date Value Ref Range Status   10/21/2019 Non-reactive Non-reactive Final     No results found for: QUANTIFERON    Medications:  Current Outpatient Medications on File Prior to Visit    Medication Sig Dispense Refill    albuterol (PROVENTIL/VENTOLIN HFA) 90 mcg/actuation inhaler INHALE 2 PUFFS BY MOUTH EVERY 6 HOURS AS NEEDED FOR WHEEZING 9 g 0    ascorbic acid, vitamin C, (VITAMIN C) 1000 MG tablet Take 1,000 mg by mouth once daily.      aspirin (ECOTRIN) 81 MG EC tablet Take 81 mg by mouth.      b complex vitamins capsule Take 1 capsule by mouth once daily.      FLUZONE HIGHDOSE QUAD 21-22  mcg/0.7 mL Syrg       mucus clearing device (AEROBIKA OSCILLATING PEP SYSTM) by Misc.(Non-Drug; Combo Route) route 2 (two) times a day. 1 Device 0    nebulizer and compressor (MtoV COMPRESSOR SYSTEM) Marcy use to administer nebulized medication as directed 1 each 0    omega 3-dha-epa-fish oil 1,000 (120-180) mg Cap Take 1 capsule by mouth once daily.      omega-3 fatty acids 1,000 mg Cap Take 2 g by mouth.      omeprazole 20 mg TbEC 1/2 tablet daily in am as needed. 45 each 0    pravastatin (PRAVACHOL) 10 MG tablet Take 1 tablet by mouth once daily (Patient not taking: Reported on 1/3/2022) 90 tablet 1    promethazine-dextromethorphan (PROMETHAZINE-DM) 6.25-15 mg/5 mL Syrp Take 10-15ml by mouth every 8 hours as needed for coughing 240 mL 0    pulse oximeter (PULSE OXIMETER) device by Apply Externally route 2 (two) times a day. Use twice daily at 8 AM and 3 PM and record the value in MyChart as directed. 1 each 0    pulse oximeter (PULSE OXIMETER) device by Apply Externally route 2 (two) times a day. Use twice daily at 8 AM and 3 PM and record the value in MyChart as directed. 1 each 0    sodium chloride 3% 3 % nebulizer solution USE 4 ML VIAL IN NEBULIZER  TWICE DAILY 480 mL 0    warfarin (COUMADIN) 3 MG tablet Take 1.5 tablets (4.5 mg total) by mouth Daily. As directed by coumadin clinic 45 tablet 11     Current Facility-Administered Medications on File Prior to Visit   Medication Dose Route Frequency Provider Last Rate Last Admin    acetaminophen tablet 650 mg  650 mg Oral Once PRN  Joaquín Pemberton MD        albuterol inhaler 2 puff  2 puff Inhalation Q20 Min PRN Joaquín Pemberton MD        ondansetron disintegrating tablet 4 mg  4 mg Oral Once PRN Joaquín Pemberton MD        sodium chloride 0.9% 500 mL flush bag   Intravenous PRN Joaquín Pemberton MD        sodium chloride 0.9% flush 10 mL  10 mL Intravenous PRN Joaquín Pemberton MD           Antibiotics:   Antibiotics (From admission, onward)            None          HIV: No components found for: HIV 1/2 AG/AB  Hepatitis C IgG: No components found for: HEPATITIS C  Syphilis:   RPR   Date Value Ref Range Status   10/21/2019 Non-reactive Non-reactive Final       Hepatitis A IgG: No components found for: HEPATITIS A IGG  Hepatitis Bc IgG: No components found for: HEPATITIS B CORE IGG  Hepatitis Bs IgG:  Quantiferon: No results found for: QUANTIFERON  VZV IgG: No components found for: VARICELLA IGG    No components found for: SEDIMENTATION RATE  No components found for: C-REACTIVE PROTEIN      Microbiology x 7d:   Microbiology Results (last 7 days)     ** No results found for the last 168 hours. **          Immunization History   Administered Date(s) Administered    COVID-19, MRNA, LN-S, PF (Pfizer) 01/13/2021, 04/28/2021    Influenza (FLUAD) - Trivalent - Adjuvanted - PF (65+) 09/25/2019    Influenza - High Dose - PF (65 years and older) 09/18/2015, 10/04/2016, 10/23/2017, 09/21/2018    Influenza A (H1N1) 2009 Monovalent - IM 01/22/2010    Influenza Split 10/10/2011, 10/06/2014    Pneumococcal Conjugate - 13 Valent 10/28/2015    Pneumococcal Polysaccharide - 23 Valent 07/12/2013    Tdap 03/08/2015    Zoster Recombinant 09/25/2019, 12/30/2019         Assessment:     Pulmonary MAC, macrolide susceptible  Cavitary lung lesion  Nodular liver- noted on CT; US abdomen 10/2021 No compelling sonographic evidence of cirrhosis.  empysema  Antiphospholipid antibody syndrome on coumadin     Meets ATS/IDSA guidelines for treatment- cough,  cavitary lung lesion, MAC from bronch sample. AFB cultures 10/7/21, 10/28/21, 11/18/21, 12/9/21 all positive MAC. CT 11/2021 with RUL cavitary lesion-3.5 x 4.3 x 5.7-cm    Risk factor-   structural: emphysema, bronchiectasis  functional: reflux    Plan:     Discussed importance of airway clearance. continue Aerobika and nebulized hypertonic saline bid. Recommended decreasing duration of hot showers, avoid soil and water aerosols, use fans in shower area, avoid fine mist shower heads (heavy droplets better), avoid bubbling water (hot tubs, humidifiers), flush water heater frequently to avoid biofilm buildup    Prevent reflux- avoid stomach sleeping. Famotidine/tums. Stop liquids 3hr before bedtime    start rifampin, ethambutol, azithromycin. Duration 1 year from clearance (anticpate >18 months of abx)    Counseled extensively on antibiotic side effects    Note that coumadin INR levels need to be monitored closely while on rifampin. Patient aware to discuss with his coumadin pharmD before starting antibiotics    While on ethambutol- self vision screens daily. If blurry vision lasts 2 days in a row, patient known to stop ethambutol and call eye doctor for exam and to let us know that this happened.    Qtc 431 1/30/21; updated ekg ordered.    rtc 1 month    Paola Rao MD  Infectious disease

## 2022-01-06 ENCOUNTER — PES CALL (OUTPATIENT)
Dept: ADMINISTRATIVE | Facility: CLINIC | Age: 82
End: 2022-01-06
Payer: MEDICARE

## 2022-01-10 ENCOUNTER — LAB VISIT (OUTPATIENT)
Dept: LAB | Facility: HOSPITAL | Age: 82
End: 2022-01-10
Attending: STUDENT IN AN ORGANIZED HEALTH CARE EDUCATION/TRAINING PROGRAM
Payer: MEDICARE

## 2022-01-10 ENCOUNTER — ANTI-COAG VISIT (OUTPATIENT)
Dept: CARDIOLOGY | Facility: CLINIC | Age: 82
End: 2022-01-10
Payer: MEDICARE

## 2022-01-10 DIAGNOSIS — D68.61 ANTIPHOSPHOLIPID SYNDROME: Primary | ICD-10-CM

## 2022-01-10 DIAGNOSIS — D68.61 ANTIPHOSPHOLIPID SYNDROME: ICD-10-CM

## 2022-01-10 LAB
INR PPP: 1.1 (ref 0.8–1.2)
PROTHROMBIN TIME: 12.2 SEC (ref 9–12.5)

## 2022-01-10 PROCEDURE — 85610 PROTHROMBIN TIME: CPT | Performed by: STUDENT IN AN ORGANIZED HEALTH CARE EDUCATION/TRAINING PROGRAM

## 2022-01-10 PROCEDURE — 36415 COLL VENOUS BLD VENIPUNCTURE: CPT | Mod: PO | Performed by: STUDENT IN AN ORGANIZED HEALTH CARE EDUCATION/TRAINING PROGRAM

## 2022-01-10 PROCEDURE — 93793 PR ANTICOAGULANT MGMT FOR PT TAKING WARFARIN: ICD-10-PCS | Mod: S$GLB,,,

## 2022-01-10 PROCEDURE — 93793 ANTICOAG MGMT PT WARFARIN: CPT | Mod: S$GLB,,,

## 2022-01-11 NOTE — PROGRESS NOTES
INR very low due to DDI rifampin. Pt denies other changes. Increase dose aggressively for rifampin interaction & follow closely

## 2022-01-12 ENCOUNTER — TELEPHONE (OUTPATIENT)
Dept: INFECTIOUS DISEASES | Facility: CLINIC | Age: 82
End: 2022-01-12
Payer: MEDICARE

## 2022-01-12 NOTE — TELEPHONE ENCOUNTER
----- Message from Nahomy Sigala LPN sent at 1/12/2022 12:35 PM CST -----  Regarding: FW: Pt Inquiry    ----- Message -----  From: Fermin Tang  Sent: 1/12/2022  12:29 PM CST  To: Maira Guevara Staff  Subject: Pt Inquiry                                       Pt wife called and requesting a call back in regards to a liver test that pt was to have done due to the different medications he's on.          [phone]  Or  [phone] ([place])

## 2022-01-13 ENCOUNTER — LAB VISIT (OUTPATIENT)
Dept: LAB | Facility: HOSPITAL | Age: 82
End: 2022-01-13
Attending: STUDENT IN AN ORGANIZED HEALTH CARE EDUCATION/TRAINING PROGRAM
Payer: MEDICARE

## 2022-01-13 ENCOUNTER — ANTI-COAG VISIT (OUTPATIENT)
Dept: CARDIOLOGY | Facility: CLINIC | Age: 82
End: 2022-01-13
Payer: MEDICARE

## 2022-01-13 DIAGNOSIS — A31.0 MYCOBACTERIUM AVIUM-INTRACELLULARE COMPLEX: ICD-10-CM

## 2022-01-13 DIAGNOSIS — Z79.01 LONG TERM (CURRENT) USE OF ANTICOAGULANTS: ICD-10-CM

## 2022-01-13 DIAGNOSIS — D68.61 ANTIPHOSPHOLIPID SYNDROME: ICD-10-CM

## 2022-01-13 DIAGNOSIS — D68.61 ANTIPHOSPHOLIPID SYNDROME: Primary | ICD-10-CM

## 2022-01-13 LAB
ALBUMIN SERPL BCP-MCNC: 3.7 G/DL (ref 3.5–5.2)
ALP SERPL-CCNC: 91 U/L (ref 55–135)
ALT SERPL W/O P-5'-P-CCNC: 23 U/L (ref 10–44)
ANION GAP SERPL CALC-SCNC: 7 MMOL/L (ref 8–16)
AST SERPL-CCNC: 23 U/L (ref 10–40)
BILIRUB SERPL-MCNC: 0.3 MG/DL (ref 0.1–1)
BUN SERPL-MCNC: 9 MG/DL (ref 8–23)
CALCIUM SERPL-MCNC: 9.4 MG/DL (ref 8.7–10.5)
CHLORIDE SERPL-SCNC: 102 MMOL/L (ref 95–110)
CO2 SERPL-SCNC: 31 MMOL/L (ref 23–29)
CREAT SERPL-MCNC: 1 MG/DL (ref 0.5–1.4)
EST. GFR  (AFRICAN AMERICAN): >60 ML/MIN/1.73 M^2
EST. GFR  (NON AFRICAN AMERICAN): >60 ML/MIN/1.73 M^2
GLUCOSE SERPL-MCNC: 165 MG/DL (ref 70–110)
INR PPP: 1.5 (ref 0.8–1.2)
POTASSIUM SERPL-SCNC: 4.3 MMOL/L (ref 3.5–5.1)
PROT SERPL-MCNC: 8.1 G/DL (ref 6–8.4)
PROTHROMBIN TIME: 15.4 SEC (ref 9–12.5)
SODIUM SERPL-SCNC: 140 MMOL/L (ref 136–145)

## 2022-01-13 PROCEDURE — 80053 COMPREHEN METABOLIC PANEL: CPT | Performed by: INTERNAL MEDICINE

## 2022-01-13 PROCEDURE — 93793 PR ANTICOAGULANT MGMT FOR PT TAKING WARFARIN: ICD-10-PCS | Mod: S$GLB,,,

## 2022-01-13 PROCEDURE — 93793 ANTICOAG MGMT PT WARFARIN: CPT | Mod: S$GLB,,,

## 2022-01-13 PROCEDURE — 36415 COLL VENOUS BLD VENIPUNCTURE: CPT | Mod: PO | Performed by: STUDENT IN AN ORGANIZED HEALTH CARE EDUCATION/TRAINING PROGRAM

## 2022-01-13 PROCEDURE — 85610 PROTHROMBIN TIME: CPT | Performed by: STUDENT IN AN ORGANIZED HEALTH CARE EDUCATION/TRAINING PROGRAM

## 2022-01-13 NOTE — TELEPHONE ENCOUNTER
Pt states that he is experiencing pain in his stomach when walking around or attempt to  something heavy. Pt is wondering if it maybe a side effect from the medication.

## 2022-01-13 NOTE — TELEPHONE ENCOUNTER
----- Message from Kristi Morse MA sent at 1/13/2022 11:40 AM CST -----  Contact: patient    ----- Message -----  From: Cathy Pitt  Sent: 1/13/2022  11:24 AM CST  To: Maira Guevara Staff    Patient wife calling in regards to speaking with someone about a new med. Requesting call back  Patient @[phone] ([place])

## 2022-01-13 NOTE — PROGRESS NOTES
Wife was given lab result, verified correct coumadin dose, reports no changes, wife was given coumadin instructions and next lab date, she verbalized understanding

## 2022-01-13 NOTE — PROGRESS NOTES
INR low from DDI w/ rifampin but moving well on increased dose since Monday. Adjust dose per calendar. Continue close follow up until stable. Pt will need new Rx for new dose. Please check where to send refill    Update 1/14: Pt stopping rifampin; last dose taken this AM. He will continue on ethambutol and azithromycin. Dose plan adjusted for the change in meds. INR due 1/18 as previously planned. Wife advised

## 2022-01-14 ENCOUNTER — TELEPHONE (OUTPATIENT)
Dept: INFECTIOUS DISEASES | Facility: CLINIC | Age: 82
End: 2022-01-14
Payer: MEDICARE

## 2022-01-14 NOTE — TELEPHONE ENCOUNTER
Spoke with pt and his wife and gave the verbal order from Dr. Rao to stop taking Rifampin and to contact the Coumadin clinic so that his coumadin can be adjusted due to stopping the Rifampin. Pt was also instructed to continue taking ethambutol and azithromycin. Pt was told if belly pains continue after stopping rifampin to call clinic back. Pt understood and agreed to verbal order and understood about contacting the coumadin clinic.

## 2022-01-14 NOTE — TELEPHONE ENCOUNTER
Upset stomach may be from rifampin. lfts ok. Hold rifampin and monitor symptoms. Have discussed with pt's coumadin clinic.     ----- Message from Kristi Morse MA sent at 1/14/2022  9:07 AM CST -----  Contact: wife    ----- Message -----  From: Kailey Galo  Sent: 1/14/2022   8:47 AM CST  To: Maira Guevara Staff    Pt has been trying to get in contact w/ office in regards to stomach pain possibly caused by medication     Call back @[phone]

## 2022-01-18 ENCOUNTER — ANTI-COAG VISIT (OUTPATIENT)
Dept: CARDIOLOGY | Facility: CLINIC | Age: 82
End: 2022-01-18
Payer: MEDICARE

## 2022-01-18 ENCOUNTER — LAB VISIT (OUTPATIENT)
Dept: LAB | Facility: HOSPITAL | Age: 82
End: 2022-01-18
Attending: STUDENT IN AN ORGANIZED HEALTH CARE EDUCATION/TRAINING PROGRAM
Payer: MEDICARE

## 2022-01-18 DIAGNOSIS — D68.61 ANTIPHOSPHOLIPID SYNDROME: ICD-10-CM

## 2022-01-18 DIAGNOSIS — A31.0 MYCOBACTERIUM AVIUM-INTRACELLULARE COMPLEX: ICD-10-CM

## 2022-01-18 DIAGNOSIS — D68.61 ANTIPHOSPHOLIPID SYNDROME: Primary | ICD-10-CM

## 2022-01-18 LAB
INR PPP: 1.3 (ref 0.8–1.2)
PROTHROMBIN TIME: 13.9 SEC (ref 9–12.5)

## 2022-01-18 PROCEDURE — 93793 ANTICOAG MGMT PT WARFARIN: CPT | Mod: S$GLB,,,

## 2022-01-18 PROCEDURE — 93793 PR ANTICOAGULANT MGMT FOR PT TAKING WARFARIN: ICD-10-PCS | Mod: S$GLB,,,

## 2022-01-18 PROCEDURE — 85610 PROTHROMBIN TIME: CPT | Performed by: STUDENT IN AN ORGANIZED HEALTH CARE EDUCATION/TRAINING PROGRAM

## 2022-01-18 PROCEDURE — 36415 COLL VENOUS BLD VENIPUNCTURE: CPT | Mod: PO | Performed by: STUDENT IN AN ORGANIZED HEALTH CARE EDUCATION/TRAINING PROGRAM

## 2022-01-18 NOTE — PROGRESS NOTES
INR low due to lingering DDI w/ rifampin which he took last dose 1/14. Adjust dose as rifampin will be clearing. Repeat INR next week.

## 2022-01-18 NOTE — TELEPHONE ENCOUNTER
----- Message from Kimi Larry sent at 1/18/2022  3:37 PM CST -----  RACIEL MALCOLM calling regarding Refills  (message) for sodium chloride 3% 3 % nebulizer solution#call back [phone]      [facility] [place] - [id number]  [id number]  [place] [address]  Phone: [phone] Fax: [phone]

## 2022-01-19 ENCOUNTER — LAB VISIT (OUTPATIENT)
Dept: LAB | Facility: HOSPITAL | Age: 82
End: 2022-01-19
Attending: INTERNAL MEDICINE
Payer: MEDICARE

## 2022-01-19 DIAGNOSIS — A31.0 MYCOBACTERIUM AVIUM-INTRACELLULARE COMPLEX: ICD-10-CM

## 2022-01-19 PROCEDURE — 87118 MYCOBACTERIC IDENTIFICATION: CPT | Performed by: INTERNAL MEDICINE

## 2022-01-19 PROCEDURE — 87149 DNA/RNA DIRECT PROBE: CPT | Performed by: INTERNAL MEDICINE

## 2022-01-19 PROCEDURE — 87116 MYCOBACTERIA CULTURE: CPT | Performed by: INTERNAL MEDICINE

## 2022-01-19 PROCEDURE — 87206 SMEAR FLUORESCENT/ACID STAI: CPT | Performed by: INTERNAL MEDICINE

## 2022-01-19 PROCEDURE — 87015 SPECIMEN INFECT AGNT CONCNTJ: CPT | Performed by: INTERNAL MEDICINE

## 2022-01-19 RX ORDER — SODIUM CHLORIDE FOR INHALATION 3 %
VIAL, NEBULIZER (ML) INHALATION
Qty: 480 ML | Refills: 0 | Status: SHIPPED | OUTPATIENT
Start: 2022-01-19 | End: 2022-02-11 | Stop reason: SDUPTHER

## 2022-01-20 ENCOUNTER — TELEPHONE (OUTPATIENT)
Dept: INFECTIOUS DISEASES | Facility: CLINIC | Age: 82
End: 2022-01-20
Payer: MEDICARE

## 2022-01-20 NOTE — TELEPHONE ENCOUNTER
----- Message from Alissaninoska Mcgregor sent at 1/20/2022 11:39 AM CST -----  Regarding: speak with nurse  Contact: patient wife  [phone]    above patient wife called said this is her second urgent call concerning sending medication sodium chloride to a new pharmacy [facility] pharmacy # [phone]  or  [phone]   said patient need this ASAP please call wife to let her know this was taken care of thanks.

## 2022-01-24 ENCOUNTER — LAB VISIT (OUTPATIENT)
Dept: LAB | Facility: HOSPITAL | Age: 82
End: 2022-01-24
Attending: STUDENT IN AN ORGANIZED HEALTH CARE EDUCATION/TRAINING PROGRAM
Payer: MEDICARE

## 2022-01-24 ENCOUNTER — ANTI-COAG VISIT (OUTPATIENT)
Dept: CARDIOLOGY | Facility: CLINIC | Age: 82
End: 2022-01-24
Payer: MEDICARE

## 2022-01-24 ENCOUNTER — TELEPHONE (OUTPATIENT)
Dept: INFECTIOUS DISEASES | Facility: CLINIC | Age: 82
End: 2022-01-24
Payer: MEDICARE

## 2022-01-24 DIAGNOSIS — D68.61 ANTIPHOSPHOLIPID SYNDROME: ICD-10-CM

## 2022-01-24 DIAGNOSIS — I26.99 PULMONARY EMBOLISM, UNSPECIFIED CHRONICITY, UNSPECIFIED PULMONARY EMBOLISM TYPE, UNSPECIFIED WHETHER ACUTE COR PULMONALE PRESENT: Primary | ICD-10-CM

## 2022-01-24 LAB
INR PPP: 1.4 (ref 0.8–1.2)
PROTHROMBIN TIME: 14.6 SEC (ref 9–12.5)

## 2022-01-24 PROCEDURE — 85610 PROTHROMBIN TIME: CPT | Performed by: STUDENT IN AN ORGANIZED HEALTH CARE EDUCATION/TRAINING PROGRAM

## 2022-01-24 PROCEDURE — 93793 ANTICOAG MGMT PT WARFARIN: CPT | Mod: S$GLB,,,

## 2022-01-24 PROCEDURE — 93793 PR ANTICOAGULANT MGMT FOR PT TAKING WARFARIN: ICD-10-PCS | Mod: S$GLB,,,

## 2022-01-24 PROCEDURE — 36415 COLL VENOUS BLD VENIPUNCTURE: CPT | Mod: PO | Performed by: STUDENT IN AN ORGANIZED HEALTH CARE EDUCATION/TRAINING PROGRAM

## 2022-01-24 RX ORDER — ETHAMBUTOL HYDROCHLORIDE 400 MG/1
800 TABLET, FILM COATED ORAL DAILY
Qty: 60 TABLET | Refills: 5 | Status: SHIPPED | OUTPATIENT
Start: 2022-01-24 | End: 2022-08-02 | Stop reason: SDUPTHER

## 2022-01-24 RX ORDER — AZITHROMYCIN 500 MG/1
500 TABLET, FILM COATED ORAL DAILY
Qty: 30 TABLET | Refills: 5 | Status: SHIPPED | OUTPATIENT
Start: 2022-01-24 | End: 2022-02-23

## 2022-01-24 NOTE — TELEPHONE ENCOUNTER
----- Message from Wilian Santoyo MA sent at 1/24/2022  2:35 PM CST -----  Regarding: FW: speak with nurse  Contact: patient wife    ----- Message -----  From: Alissa Mcgregor  Sent: 1/24/2022   2:25 PM CST  To: Maira Guevara Staff  Subject: speak with nurse                                 Above patient wife called said to please send medication refills to [facility] pharmacy [phone] for medications Zithromax 500 mg and Ethambutol 400 mg thanks.

## 2022-01-24 NOTE — TELEPHONE ENCOUNTER
Spoke with pt and his wife and informed them that the pt Rx has been sent to the pharmacy they requested. Pt and wife understood and agreed.

## 2022-01-25 ENCOUNTER — OFFICE VISIT (OUTPATIENT)
Dept: OTOLARYNGOLOGY | Facility: CLINIC | Age: 82
End: 2022-01-25
Payer: MEDICARE

## 2022-01-25 ENCOUNTER — CLINICAL SUPPORT (OUTPATIENT)
Dept: AUDIOLOGY | Facility: CLINIC | Age: 82
End: 2022-01-25
Payer: MEDICARE

## 2022-01-25 VITALS — BODY MASS INDEX: 23.63 KG/M2 | WEIGHT: 160 LBS

## 2022-01-25 DIAGNOSIS — H61.23 BILATERAL IMPACTED CERUMEN: Primary | ICD-10-CM

## 2022-01-25 DIAGNOSIS — H90.3 SENSORINEURAL HEARING LOSS, BILATERAL: Primary | ICD-10-CM

## 2022-01-25 PROCEDURE — 99999 PR PBB SHADOW E&M-EST. PATIENT-LVL I: CPT | Mod: PBBFAC,,, | Performed by: AUDIOLOGIST

## 2022-01-25 PROCEDURE — 99999 PR PBB SHADOW E&M-EST. PATIENT-LVL I: CPT | Mod: PBBFAC,,, | Performed by: OTOLARYNGOLOGY

## 2022-01-25 PROCEDURE — 1101F PT FALLS ASSESS-DOCD LE1/YR: CPT | Mod: CPTII,S$GLB,, | Performed by: OTOLARYNGOLOGY

## 2022-01-25 PROCEDURE — 92557 COMPREHENSIVE HEARING TEST: CPT | Mod: S$GLB,,, | Performed by: AUDIOLOGIST

## 2022-01-25 PROCEDURE — 1101F PR PT FALLS ASSESS DOC 0-1 FALLS W/OUT INJ PAST YR: ICD-10-PCS | Mod: CPTII,S$GLB,, | Performed by: OTOLARYNGOLOGY

## 2022-01-25 PROCEDURE — 69210 REMOVE IMPACTED EAR WAX UNI: CPT | Mod: S$GLB,,, | Performed by: OTOLARYNGOLOGY

## 2022-01-25 PROCEDURE — 92567 PR TYMPA2METRY: ICD-10-PCS | Mod: S$GLB,,, | Performed by: AUDIOLOGIST

## 2022-01-25 PROCEDURE — 92567 TYMPANOMETRY: CPT | Mod: S$GLB,,, | Performed by: AUDIOLOGIST

## 2022-01-25 PROCEDURE — 92557 PR COMPREHENSIVE HEARING TEST: ICD-10-PCS | Mod: S$GLB,,, | Performed by: AUDIOLOGIST

## 2022-01-25 PROCEDURE — 1126F PR PAIN SEVERITY QUANTIFIED, NO PAIN PRESENT: ICD-10-PCS | Mod: CPTII,S$GLB,, | Performed by: OTOLARYNGOLOGY

## 2022-01-25 PROCEDURE — 1126F AMNT PAIN NOTED NONE PRSNT: CPT | Mod: CPTII,S$GLB,, | Performed by: OTOLARYNGOLOGY

## 2022-01-25 PROCEDURE — 3288F FALL RISK ASSESSMENT DOCD: CPT | Mod: CPTII,S$GLB,, | Performed by: OTOLARYNGOLOGY

## 2022-01-25 PROCEDURE — 99999 PR PBB SHADOW E&M-EST. PATIENT-LVL I: ICD-10-PCS | Mod: PBBFAC,,, | Performed by: AUDIOLOGIST

## 2022-01-25 PROCEDURE — 99499 UNLISTED E&M SERVICE: CPT | Mod: S$GLB,,, | Performed by: OTOLARYNGOLOGY

## 2022-01-25 PROCEDURE — 99999 PR PBB SHADOW E&M-EST. PATIENT-LVL I: ICD-10-PCS | Mod: PBBFAC,,, | Performed by: OTOLARYNGOLOGY

## 2022-01-25 PROCEDURE — 1157F ADVNC CARE PLAN IN RCRD: CPT | Mod: CPTII,S$GLB,, | Performed by: OTOLARYNGOLOGY

## 2022-01-25 PROCEDURE — 99499 NO LOS: ICD-10-PCS | Mod: S$GLB,,, | Performed by: OTOLARYNGOLOGY

## 2022-01-25 PROCEDURE — 69210 EAR CERUMEN REMOVAL: ICD-10-PCS | Mod: S$GLB,,, | Performed by: OTOLARYNGOLOGY

## 2022-01-25 PROCEDURE — 1157F PR ADVANCE CARE PLAN OR EQUIV PRESENT IN MEDICAL RECORD: ICD-10-PCS | Mod: CPTII,S$GLB,, | Performed by: OTOLARYNGOLOGY

## 2022-01-25 PROCEDURE — 3288F PR FALLS RISK ASSESSMENT DOCUMENTED: ICD-10-PCS | Mod: CPTII,S$GLB,, | Performed by: OTOLARYNGOLOGY

## 2022-01-25 NOTE — PROCEDURES
Ear Cerumen Removal    Date/Time: 1/25/2022 10:00 AM  Performed by: Alhaji Hatch MD  Authorized by: Alhaji Hatch MD     Consent Done?:  Yes (Verbal)  Location details:  Both ears  Procedure type: curette    Cerumen  Removal Results:  Cerumen completely removed  Patient tolerance:  Patient tolerated the procedure well with no immediate complications     Binocular microscopy used to examine ear canal and tympanic membrane.  There was a cerumen impaction on the right side and an impaction of cerumen on the left.  This was removed using a curette and other microinstrumentation.

## 2022-01-25 NOTE — PROGRESS NOTES
"Diego Gunter was seen today in the clinic for an audiologic evaluation.  Patient's main complaint was difficulty hearing in both ears.  Mr. Gunter reported he feels like his left is "more clogged up" than his right ear.  He denied any tinnitus.    Otoscopy revealed occluding cerumen in both ears; Mr. Gunter was able to get added on to Dr. Alhaji Hatch's schedule for cerumen removal prior to hearing test.    Tympanometry revealed Type A in the right ear and Type A in the left ear.     Audiogram results revealed a mild to severe sensorineural hearing loss (SNHL) in the right and left ear.      Speech reception thresholds were noted at 35 dB in the right ear and 40 dB in the left ear.    Speech discrimination scores were 100% in the right ear and 92% in the left ear.    Results were discussed and reviewed with patient and his wife; recommended patient call [employer] to determine hearing aid benefit.  Patient was advised that [facility] does not file with insurance for hearing aids.    Recommendations:  1. Otologic evaluation  2. Annual audiogram  3. Hearing protection when in noise  4. Hearing aid consultation            "

## 2022-01-26 LAB — ACID FAST SUSCEPTIBILITY, SLOW GROWER: ABNORMAL

## 2022-01-31 ENCOUNTER — ANTI-COAG VISIT (OUTPATIENT)
Dept: CARDIOLOGY | Facility: CLINIC | Age: 82
End: 2022-01-31
Payer: MEDICARE

## 2022-01-31 ENCOUNTER — LAB VISIT (OUTPATIENT)
Dept: LAB | Facility: HOSPITAL | Age: 82
End: 2022-01-31
Attending: STUDENT IN AN ORGANIZED HEALTH CARE EDUCATION/TRAINING PROGRAM
Payer: MEDICARE

## 2022-01-31 DIAGNOSIS — D68.61 ANTIPHOSPHOLIPID SYNDROME: ICD-10-CM

## 2022-01-31 DIAGNOSIS — D68.61 ANTIPHOSPHOLIPID SYNDROME: Primary | ICD-10-CM

## 2022-01-31 LAB
INR PPP: 2.2 (ref 0.8–1.2)
PROTHROMBIN TIME: 23 SEC (ref 9–12.5)

## 2022-01-31 PROCEDURE — 36415 COLL VENOUS BLD VENIPUNCTURE: CPT | Mod: PO | Performed by: STUDENT IN AN ORGANIZED HEALTH CARE EDUCATION/TRAINING PROGRAM

## 2022-01-31 PROCEDURE — 85610 PROTHROMBIN TIME: CPT | Performed by: STUDENT IN AN ORGANIZED HEALTH CARE EDUCATION/TRAINING PROGRAM

## 2022-01-31 PROCEDURE — 93793 PR ANTICOAGULANT MGMT FOR PT TAKING WARFARIN: ICD-10-PCS | Mod: S$GLB,,,

## 2022-01-31 PROCEDURE — 93793 ANTICOAG MGMT PT WARFARIN: CPT | Mod: S$GLB,,,

## 2022-02-07 ENCOUNTER — ANTI-COAG VISIT (OUTPATIENT)
Dept: CARDIOLOGY | Facility: CLINIC | Age: 82
End: 2022-02-07
Payer: MEDICARE

## 2022-02-07 ENCOUNTER — LAB VISIT (OUTPATIENT)
Dept: LAB | Facility: HOSPITAL | Age: 82
End: 2022-02-07
Attending: STUDENT IN AN ORGANIZED HEALTH CARE EDUCATION/TRAINING PROGRAM
Payer: MEDICARE

## 2022-02-07 DIAGNOSIS — D68.61 ANTIPHOSPHOLIPID SYNDROME: Primary | ICD-10-CM

## 2022-02-07 DIAGNOSIS — D68.61 ANTIPHOSPHOLIPID SYNDROME: ICD-10-CM

## 2022-02-07 LAB
INR PPP: 2.9 (ref 0.8–1.2)
PROTHROMBIN TIME: 29 SEC (ref 9–12.5)

## 2022-02-07 PROCEDURE — 36415 COLL VENOUS BLD VENIPUNCTURE: CPT | Mod: PO | Performed by: STUDENT IN AN ORGANIZED HEALTH CARE EDUCATION/TRAINING PROGRAM

## 2022-02-07 PROCEDURE — 93793 PR ANTICOAGULANT MGMT FOR PT TAKING WARFARIN: ICD-10-PCS | Mod: S$GLB,,,

## 2022-02-07 PROCEDURE — 93793 ANTICOAG MGMT PT WARFARIN: CPT | Mod: S$GLB,,,

## 2022-02-07 PROCEDURE — 85610 PROTHROMBIN TIME: CPT | Performed by: STUDENT IN AN ORGANIZED HEALTH CARE EDUCATION/TRAINING PROGRAM

## 2022-02-07 NOTE — PROGRESS NOTES
Seems as if rifampin DDI finally clearing. Adjust dose based on trend and historical dosing. Recheck INR in 1 week

## 2022-02-10 ENCOUNTER — LAB VISIT (OUTPATIENT)
Dept: LAB | Facility: HOSPITAL | Age: 82
End: 2022-02-10
Attending: INTERNAL MEDICINE
Payer: MEDICARE

## 2022-02-10 DIAGNOSIS — A31.0 MYCOBACTERIUM AVIUM-INTRACELLULARE COMPLEX: ICD-10-CM

## 2022-02-10 PROCEDURE — 87186 SC STD MICRODIL/AGAR DIL: CPT | Performed by: INTERNAL MEDICINE

## 2022-02-10 PROCEDURE — 87118 MYCOBACTERIC IDENTIFICATION: CPT | Performed by: INTERNAL MEDICINE

## 2022-02-10 PROCEDURE — 87149 DNA/RNA DIRECT PROBE: CPT | Performed by: INTERNAL MEDICINE

## 2022-02-10 PROCEDURE — 87206 SMEAR FLUORESCENT/ACID STAI: CPT | Performed by: INTERNAL MEDICINE

## 2022-02-10 PROCEDURE — 87116 MYCOBACTERIA CULTURE: CPT | Performed by: INTERNAL MEDICINE

## 2022-02-10 PROCEDURE — 87015 SPECIMEN INFECT AGNT CONCNTJ: CPT | Performed by: INTERNAL MEDICINE

## 2022-02-11 ENCOUNTER — OFFICE VISIT (OUTPATIENT)
Dept: INFECTIOUS DISEASES | Facility: CLINIC | Age: 82
End: 2022-02-11
Payer: MEDICARE

## 2022-02-11 VITALS
HEART RATE: 56 BPM | DIASTOLIC BLOOD PRESSURE: 73 MMHG | WEIGHT: 164.88 LBS | BODY MASS INDEX: 24.42 KG/M2 | SYSTOLIC BLOOD PRESSURE: 120 MMHG | HEIGHT: 69 IN | TEMPERATURE: 98 F

## 2022-02-11 DIAGNOSIS — A31.0 PULMONARY MYCOBACTERIUM AVIUM COMPLEX (MAC) INFECTION: ICD-10-CM

## 2022-02-11 DIAGNOSIS — J98.4 CAVITARY LUNG DISEASE: Primary | ICD-10-CM

## 2022-02-11 DIAGNOSIS — A31.0 MYCOBACTERIUM AVIUM-INTRACELLULARE COMPLEX: ICD-10-CM

## 2022-02-11 PROCEDURE — 1101F PT FALLS ASSESS-DOCD LE1/YR: CPT | Mod: CPTII,S$GLB,, | Performed by: INTERNAL MEDICINE

## 2022-02-11 PROCEDURE — 3078F DIAST BP <80 MM HG: CPT | Mod: CPTII,S$GLB,, | Performed by: INTERNAL MEDICINE

## 2022-02-11 PROCEDURE — 3078F PR MOST RECENT DIASTOLIC BLOOD PRESSURE < 80 MM HG: ICD-10-PCS | Mod: CPTII,S$GLB,, | Performed by: INTERNAL MEDICINE

## 2022-02-11 PROCEDURE — 1126F AMNT PAIN NOTED NONE PRSNT: CPT | Mod: CPTII,S$GLB,, | Performed by: INTERNAL MEDICINE

## 2022-02-11 PROCEDURE — 3288F PR FALLS RISK ASSESSMENT DOCUMENTED: ICD-10-PCS | Mod: CPTII,S$GLB,, | Performed by: INTERNAL MEDICINE

## 2022-02-11 PROCEDURE — 1157F PR ADVANCE CARE PLAN OR EQUIV PRESENT IN MEDICAL RECORD: ICD-10-PCS | Mod: CPTII,S$GLB,, | Performed by: INTERNAL MEDICINE

## 2022-02-11 PROCEDURE — 99214 PR OFFICE/OUTPT VISIT, EST, LEVL IV, 30-39 MIN: ICD-10-PCS | Mod: S$GLB,,, | Performed by: INTERNAL MEDICINE

## 2022-02-11 PROCEDURE — 3074F PR MOST RECENT SYSTOLIC BLOOD PRESSURE < 130 MM HG: ICD-10-PCS | Mod: CPTII,S$GLB,, | Performed by: INTERNAL MEDICINE

## 2022-02-11 PROCEDURE — 1126F PR PAIN SEVERITY QUANTIFIED, NO PAIN PRESENT: ICD-10-PCS | Mod: CPTII,S$GLB,, | Performed by: INTERNAL MEDICINE

## 2022-02-11 PROCEDURE — 3288F FALL RISK ASSESSMENT DOCD: CPT | Mod: CPTII,S$GLB,, | Performed by: INTERNAL MEDICINE

## 2022-02-11 PROCEDURE — 1157F ADVNC CARE PLAN IN RCRD: CPT | Mod: CPTII,S$GLB,, | Performed by: INTERNAL MEDICINE

## 2022-02-11 PROCEDURE — 1101F PR PT FALLS ASSESS DOC 0-1 FALLS W/OUT INJ PAST YR: ICD-10-PCS | Mod: CPTII,S$GLB,, | Performed by: INTERNAL MEDICINE

## 2022-02-11 PROCEDURE — 3074F SYST BP LT 130 MM HG: CPT | Mod: CPTII,S$GLB,, | Performed by: INTERNAL MEDICINE

## 2022-02-11 PROCEDURE — 99214 OFFICE O/P EST MOD 30 MIN: CPT | Mod: S$GLB,,, | Performed by: INTERNAL MEDICINE

## 2022-02-11 PROCEDURE — 99999 PR PBB SHADOW E&M-EST. PATIENT-LVL III: ICD-10-PCS | Mod: PBBFAC,,, | Performed by: INTERNAL MEDICINE

## 2022-02-11 PROCEDURE — 99999 PR PBB SHADOW E&M-EST. PATIENT-LVL III: CPT | Mod: PBBFAC,,, | Performed by: INTERNAL MEDICINE

## 2022-02-11 RX ORDER — SODIUM CHLORIDE FOR INHALATION 3 %
VIAL, NEBULIZER (ML) INHALATION
Qty: 480 ML | Refills: 11 | Status: SHIPPED | OUTPATIENT
Start: 2022-02-11 | End: 2022-08-15 | Stop reason: SDUPTHER

## 2022-02-11 NOTE — PROGRESS NOTES
INFECTIOUS DISEASE CLINIC  02/11/2022 9:48 AM    Subjective:      Chief Complaint: MAC follow up    History of Present Illness:    Patient Diego Gunter is a 81 y.o. male with h/o emphysema and cavitary MAC infection and antiphospholipid antibody syndrome on coumadin seen in clinic for MAC f/u.    Last seen 12/29 and started on rifampin, ethambutol. Azithromycin. Had trouble tolerating rifampin (abdominal pain, nausea) and medication was stopped. Pt reports symptoms resolved since stopping rifampin. Reports 100% compliance with azithro/ethambutol. Denies issues tolerating therapy. Denies side effects. Seeing optho. persistant cough with phlegm.  Using aerobika and hypertonic saline bid.     Labs 1/13/22- normal lft        Takes prilosec for reflux. Quit smoking 57 y ago.t in clinic with wife and son. Interested in starting MAC treatment, but nervous about side effects.      Had bronch 10/2018 for work up of lung mass     BRONCHIAL WASH CYTOLOGY:  NEGATIVE EXAM-NO NEOPLASIA IDENTIFIED  NO ORGANISMS IDENTIFIED WITH A GMS STAIN  THE CONTROL STAINED APPROPRIATEL        Organism     MYCOBACTERIUM AVIUM COMPLEX   Antibiotic    TED (mcg/mL)  Interpretation   ----------------------------------------------------------   Moxifloxacin             2       I   Clarithromycin           1       S   Amikacin                64   Streptomycin           >64   Linezolid               64       R   Ethambutol              16   Rifampin                 8   Rifabutin           <=0.25         AFB cultures 10/7/21, 10/28/21, 11/18/21, 12/9/21 all positive MAC     CT 11/2021  1. Continued evolution of previously described right upper lobe cavitary lesion measuring approximately 3.5 x 4.3 x 5.7-cm (previously 3.3 x 4.4 x 5.1-cm). Similar to minimal interval decrease in the amount of surrounding wall thickness, consolidation and surrounding scattered small consolidative multifocal airspace opacities.  Mediastinal lymphadenopathy, not  significantly changed.  2. Symmetric moderate-severe centrilobular emphysematous changes with basoapical gradient, not significantly changed.       Review of Symptoms:  Constitutional: Denies fevers, chills, or weakness.  ENT: Denies dysphagia, nasal discharge, ear pain or discharge.  Cardiovascular: Denies chest pain, palpitations, orthopnea, or claudication.  Respiratory: Denies shortness of breath, cough, hemoptysis, or wheezing.  GI: Denies nausea/vomitting, hematochezia, melena, abd pain, or changes in appetite.  : Denies dysuria, incontinence, or hematuria.  Musculoskeletal: Denies joint pain or myalgias.  Skin/breast: Denies rashes, lumps, lesions, or discharge.  Neurologic: Denies headache, dizziness, vertigo, or paresthesias.    Past Medical History:   Diagnosis Date    Antiphospholipid syndrome 11/19/2021    Dysphagia     Hx of colonic polyps     followed by GI. Dr. Pham    Hyperlipidemia LDL goal <100     Overweight(278.02)     Prostate cancer september 2011    followed by urology, Dr. Rogers    Type II or unspecified type diabetes mellitus without mention of complication, not stated as uncontrolled     diet controlled       Past Surgical History:   Procedure Laterality Date    BRONCHOSCOPY N/A 10/15/2018    Procedure: BRONCHOSCOPY;  Surgeon: Dosc Diagnostic Provider;  Location: [facility] OR [address] [place] [address];  Service: Anesthesiology;  Laterality: N/A;    CATARACT EXTRACTION, BILATERAL  2014    PROSTATECTOMY         Family History   Problem Relation Age of Onset    Colon cancer Mother     Diabetes Mother     Heart disease Father     Mental illness Sister     Diabetes Sister     Other Brother         polio as a child    Pulmonary fibrosis Brother     Diabetes Unknown     Diabetes Brother     Myasthenia gravis Brother     Parkinsonism Brother     Diabetes Brother     Dementia Brother     Lung cancer Brother         smoker    Diabetes Maternal Aunt        Social History     Socioeconomic  "History    Marital status:     Number of children: 2   Occupational History    Occupation: tool    Tobacco Use    Smoking status: Former Smoker     Packs/day: 0.25     Years: 5.00     Pack years: 1.25     Quit date: 10/2/1962     Years since quittin.4    Smokeless tobacco: Never Used    Tobacco comment: quit age 23   Substance and Sexual Activity    Alcohol use: Yes     Comment: beer occasionally    Drug use: No    Sexual activity: Yes     Partners: Female       Review of patient's allergies indicates:  No Known Allergies      Objective:   /73 (BP Location: Right arm)   Pulse (!) 56   Temp 98 °F (36.7 °C) (Oral)   Ht 5' 9" (1.753 m)   Wt 74.8 kg (164 lb 14.5 oz)   BMI 24.35 kg/m²     General: Afebrile, alert, comfortable, no acute distress.   HEENT: TERELL. EOMI, no scleral icterus. No sinus tenderness.   Pulmonary: Non labored,clear to auscultation A/P/L.   Cardiac: normal S1 & S2 w/o rubs/murmurs/gallops  Abdominal: Non-tender, non-distended  Extremities: Moves all extremities x 4. No peripheral edema.  Skin: No jaundice, rashes, or visible lesions.   Neurological:  Alert and oriented x 4.       Labs:  Glucose   Date Value Ref Range Status   2022 165 (H) 70 - 110 mg/dL Final   2021 135 (H) 70 - 110 mg/dL Final   2021 116 (H) 70 - 110 mg/dL Final     Calcium   Date Value Ref Range Status   2022 9.4 8.7 - 10.5 mg/dL Final   2021 10.3 8.7 - 10.5 mg/dL Final   2021 9.5 8.7 - 10.5 mg/dL Final     Albumin   Date Value Ref Range Status   2022 3.7 3.5 - 5.2 g/dL Final   2021 4.0 3.5 - 5.2 g/dL Final   2021 3.8 3.5 - 5.2 g/dL Final     Total Protein   Date Value Ref Range Status   2022 8.1 6.0 - 8.4 g/dL Final   2021 8.3 6.0 - 8.4 g/dL Final   2021 7.7 6.0 - 8.4 g/dL Final     Sodium   Date Value Ref Range Status   2022 140 136 - 145 mmol/L Final   2021 141 136 - 145 mmol/L Final   2021 140 136 " - 145 mmol/L Final     Potassium   Date Value Ref Range Status   01/13/2022 4.3 3.5 - 5.1 mmol/L Final   11/29/2021 4.7 3.5 - 5.1 mmol/L Final   09/08/2021 4.1 3.5 - 5.1 mmol/L Final     CO2   Date Value Ref Range Status   01/13/2022 31 (H) 23 - 29 mmol/L Final   11/29/2021 30 (H) 23 - 29 mmol/L Final   09/08/2021 27 23 - 29 mmol/L Final     Chloride   Date Value Ref Range Status   01/13/2022 102 95 - 110 mmol/L Final   11/29/2021 103 95 - 110 mmol/L Final   09/08/2021 104 95 - 110 mmol/L Final     BUN   Date Value Ref Range Status   01/13/2022 9 8 - 23 mg/dL Final   11/29/2021 10 8 - 23 mg/dL Final   09/08/2021 7 (L) 8 - 23 mg/dL Final     Creatinine   Date Value Ref Range Status   01/13/2022 1.0 0.5 - 1.4 mg/dL Final   11/29/2021 1.0 0.5 - 1.4 mg/dL Final   09/08/2021 0.8 0.5 - 1.4 mg/dL Final     Alkaline Phosphatase   Date Value Ref Range Status   01/13/2022 91 55 - 135 U/L Final   11/29/2021 87 55 - 135 U/L Final   09/08/2021 66 55 - 135 U/L Final     ALT   Date Value Ref Range Status   01/13/2022 23 10 - 44 U/L Final   11/29/2021 43 10 - 44 U/L Final   09/08/2021 14 10 - 44 U/L Final     AST   Date Value Ref Range Status   01/13/2022 23 10 - 40 U/L Final   11/29/2021 32 10 - 40 U/L Final   09/08/2021 19 10 - 40 U/L Final     Total Bilirubin   Date Value Ref Range Status   01/13/2022 0.3 0.1 - 1.0 mg/dL Final     Comment:     For infants and newborns, interpretation of results should be based  on gestational age, weight and in agreement with clinical  observations.    Premature Infant recommended reference ranges:  Up to 24 hours.............<8.0 mg/dL  Up to 48 hours............<12.0 mg/dL  3-5 days..................<15.0 mg/dL  6-29 days.................<15.0 mg/dL     11/29/2021 0.4 0.1 - 1.0 mg/dL Final     Comment:     For infants and newborns, interpretation of results should be based  on gestational age, weight and in agreement with clinical  observations.    Premature Infant recommended reference  ranges:  Up to 24 hours.............<8.0 mg/dL  Up to 48 hours............<12.0 mg/dL  3-5 days..................<15.0 mg/dL  6-29 days.................<15.0 mg/dL     09/08/2021 0.7 0.1 - 1.0 mg/dL Final     Comment:     For infants and newborns, interpretation of results should be based  on gestational age, weight and in agreement with clinical  observations.    Premature Infant recommended reference ranges:  Up to 24 hours.............<8.0 mg/dL  Up to 48 hours............<12.0 mg/dL  3-5 days..................<15.0 mg/dL  6-29 days.................<15.0 mg/dL       WBC   Date Value Ref Range Status   11/29/2021 7.97 3.90 - 12.70 K/uL Final   11/12/2021 9.23 3.90 - 12.70 K/uL Final   02/04/2021 7.48 3.90 - 12.70 K/uL Final     Hemoglobin   Date Value Ref Range Status   11/29/2021 13.8 (L) 14.0 - 18.0 g/dL Final   11/12/2021 13.5 (L) 14.0 - 18.0 g/dL Final   02/04/2021 13.6 (L) 14.0 - 18.0 g/dL Final     Hematocrit   Date Value Ref Range Status   11/29/2021 44.6 40.0 - 54.0 % Final   11/12/2021 43.8 40.0 - 54.0 % Final   02/04/2021 41.7 40.0 - 54.0 % Final     MCV   Date Value Ref Range Status   11/29/2021 86 82 - 98 fL Final   11/12/2021 88 82 - 98 fL Final   02/04/2021 86 82 - 98 fL Final     Platelets   Date Value Ref Range Status   11/29/2021 234 150 - 450 K/uL Final   11/12/2021 141 (L) 150 - 450 K/uL Final   02/04/2021 305 150 - 350 K/uL Final     Lab Results   Component Value Date    CHOL 127 02/25/2021    CHOL 144 02/26/2020    CHOL 133 02/14/2019     Lab Results   Component Value Date    HDL 41 02/25/2021    HDL 39 (L) 02/26/2020    HDL 42 02/14/2019     Lab Results   Component Value Date    LDLCALC 67.4 02/25/2021    LDLCALC 87.4 02/26/2020    LDLCALC 74.6 02/14/2019     Lab Results   Component Value Date    TRIG 93 02/25/2021    TRIG 88 02/26/2020    TRIG 82 02/14/2019     Lab Results   Component Value Date    CHOLHDL 32.3 02/25/2021    CHOLHDL 27.1 02/26/2020    CHOLHDL 31.6 02/14/2019     RPR   Date  Value Ref Range Status   10/21/2019 Non-reactive Non-reactive Final     No results found for: QUANTIFERON    Medications:  Current Outpatient Medications on File Prior to Visit   Medication Sig Dispense Refill    albuterol (PROVENTIL/VENTOLIN HFA) 90 mcg/actuation inhaler INHALE 2 PUFFS BY MOUTH EVERY 6 HOURS AS NEEDED FOR WHEEZING 9 g 0    ascorbic acid, vitamin C, (VITAMIN C) 1000 MG tablet Take 1,000 mg by mouth once daily.      aspirin (ECOTRIN) 81 MG EC tablet Take 81 mg by mouth.      azithromycin (ZITHROMAX) 500 MG tablet Take 1 tablet (500 mg total) by mouth once daily. 30 tablet 5    b complex vitamins capsule Take 1 capsule by mouth once daily.      ethambutoL (MYAMBUTOL) 400 MG Tab Take 2 tablets (800 mg total) by mouth once daily. 60 tablet 5    FLUZONE HIGHDOSE QUAD 21-22  mcg/0.7 mL Syrg       mucus clearing device (AEROBIKA OSCILLATING PEP SYSTM) by Misc.(Non-Drug; Combo Route) route 2 (two) times a day. 1 Device 0    nebulizer and compressor (OMBRA COMPRESSOR SYSTEM) Marcy use to administer nebulized medication as directed 1 each 0    omega 3-dha-epa-fish oil 1,000 (120-180) mg Cap Take 1 capsule by mouth once daily.      omega-3 fatty acids 1,000 mg Cap Take 2 g by mouth.      omeprazole 20 mg TbEC 1/2 tablet daily in am as needed. 45 each 0    pravastatin (PRAVACHOL) 10 MG tablet Take 1 tablet by mouth once daily 90 tablet 1    promethazine-dextromethorphan (PROMETHAZINE-DM) 6.25-15 mg/5 mL Syrp Take 10-15ml by mouth every 8 hours as needed for coughing 240 mL 0    pulse oximeter (PULSE OXIMETER) device by Apply Externally route 2 (two) times a day. Use twice daily at 8 AM and 3 PM and record the value in MyChart as directed. 1 each 0    pulse oximeter (PULSE OXIMETER) device by Apply Externally route 2 (two) times a day. Use twice daily at 8 AM and 3 PM and record the value in MyChart as directed. 1 each 0    sodium chloride 3% 3 % nebulizer solution USE 4 ML VIAL IN  NEBULIZER  TWICE DAILY 480 mL 0    warfarin (COUMADIN) 3 MG tablet Take 1.5 tablets (4.5 mg total) by mouth Daily. As directed by coumadin clinic 45 tablet 11     Current Facility-Administered Medications on File Prior to Visit   Medication Dose Route Frequency Provider Last Rate Last Admin    acetaminophen tablet 650 mg  650 mg Oral Once PRN Joaquín Pemberton MD        albuterol inhaler 2 puff  2 puff Inhalation Q20 Min PRN Joaquín Pemberton MD        ondansetron disintegrating tablet 4 mg  4 mg Oral Once PRN Joaquín Pemberton MD        sodium chloride 0.9% 500 mL flush bag   Intravenous PRN Joaquín Pemberton MD        sodium chloride 0.9% flush 10 mL  10 mL Intravenous PRN Joaquín Pemberton MD           Antibiotics:   Antibiotics (From admission, onward)            None          HIV: No components found for: HIV 1/2 AG/AB  Hepatitis C IgG: No components found for: HEPATITIS C  Syphilis:   RPR   Date Value Ref Range Status   10/21/2019 Non-reactive Non-reactive Final       Hepatitis A IgG: No components found for: HEPATITIS A IGG  Hepatitis Bc IgG: No components found for: HEPATITIS B CORE IGG  Hepatitis Bs IgG:  Quantiferon: No results found for: QUANTIFERON  VZV IgG: No components found for: VARICELLA IGG    No components found for: SEDIMENTATION RATE  No components found for: C-REACTIVE PROTEIN      Microbiology x 7d:   Microbiology Results (last 7 days)     ** No results found for the last 168 hours. **          Immunization History   Administered Date(s) Administered    COVID-19, MRNA, LN-S, PF (Pfizer) (Purple Cap) 01/13/2021, 04/28/2021    Influenza (FLUAD) - Trivalent - Adjuvanted - PF (65+) 09/25/2019    Influenza - High Dose - PF (65 years and older) 09/18/2015, 10/04/2016, 10/23/2017, 09/21/2018    Influenza A (H1N1) 2009 Monovalent - IM 01/22/2010    Influenza Split 10/10/2011, 10/06/2014    Pneumococcal Conjugate - 13 Valent 10/28/2015    Pneumococcal Polysaccharide - 23 Valent  07/12/2013    Tdap 03/08/2015    Zoster Recombinant 09/25/2019, 12/30/2019         Reviewed records today as well as relevant labs, cultures, and imaging    Assessment:         Pulmonary MAC, macrolide susceptible  Cavitary lung lesion  Nodular liver- noted on CT; US abdomen 10/2021 No compelling sonographic evidence of cirrhosis.  empysema  Antiphospholipid antibody syndrome on coumadin      Meets ATS/IDSA guidelines for treatment- cough, cavitary lung lesion, MAC from bronch sample. AFB cultures 10/7/21, 10/28/21, 11/18/21, 12/9/21 all positive MAC. CT 11/2021 with RUL cavitary lesion-3.5 x 4.3 x 5.7-cm     Risk factor-   structural: emphysema, bronchiectasis  functional: reflux      Qtc 431 1/30/21; updated ekg ordered.       Plan:      Continue antibiotics- daily ethambutol and azithromycin. Didn't tolerate rifampin.  Note amikacin vimal 64. Duration 1 year from clearance (anticpate >18 months of abx). Counseled extensively on antibiotic side effects. While on ethambutol- self vision screens daily. If blurry vision lasts 2 days in a row, patient known to stop ethambutol and call eye doctor for exam and to let us know that this happened.    Discussed importance of airway clearance. continue Aerobika and nebulized hypertonic saline bid. Recommended decreasing duration of hot showers, avoid soil and water aerosols, use fans in shower area, avoid fine mist shower heads (heavy droplets better), avoid bubbling water (hot tubs, humidifiers), flush water heater frequently to avoid biofilm buildup     Prevent reflux- avoid stomach sleeping. Famotidine/tums. Stop liquids 3hr before bedtime     Monitor afb sputum monthly     rtc 3 months      Paola Rao MD, MPH  Infectious Disease    No orders of the defined types were placed in this encounter.

## 2022-02-14 ENCOUNTER — ANTI-COAG VISIT (OUTPATIENT)
Dept: CARDIOLOGY | Facility: CLINIC | Age: 82
End: 2022-02-14
Payer: MEDICARE

## 2022-02-14 ENCOUNTER — LAB VISIT (OUTPATIENT)
Dept: LAB | Facility: HOSPITAL | Age: 82
End: 2022-02-14
Attending: STUDENT IN AN ORGANIZED HEALTH CARE EDUCATION/TRAINING PROGRAM
Payer: MEDICARE

## 2022-02-14 DIAGNOSIS — D68.61 ANTIPHOSPHOLIPID SYNDROME: ICD-10-CM

## 2022-02-14 DIAGNOSIS — I26.99 PULMONARY EMBOLISM, UNSPECIFIED CHRONICITY, UNSPECIFIED PULMONARY EMBOLISM TYPE, UNSPECIFIED WHETHER ACUTE COR PULMONALE PRESENT: Primary | ICD-10-CM

## 2022-02-14 LAB
INR PPP: 2.9 (ref 0.8–1.2)
PROTHROMBIN TIME: 29.3 SEC (ref 9–12.5)

## 2022-02-14 PROCEDURE — 93793 PR ANTICOAGULANT MGMT FOR PT TAKING WARFARIN: ICD-10-PCS | Mod: S$GLB,,,

## 2022-02-14 PROCEDURE — 93793 ANTICOAG MGMT PT WARFARIN: CPT | Mod: S$GLB,,,

## 2022-02-14 PROCEDURE — 85610 PROTHROMBIN TIME: CPT | Performed by: STUDENT IN AN ORGANIZED HEALTH CARE EDUCATION/TRAINING PROGRAM

## 2022-02-14 PROCEDURE — 36415 COLL VENOUS BLD VENIPUNCTURE: CPT | Mod: PO | Performed by: STUDENT IN AN ORGANIZED HEALTH CARE EDUCATION/TRAINING PROGRAM

## 2022-02-16 ENCOUNTER — TELEPHONE (OUTPATIENT)
Dept: INFECTIOUS DISEASES | Facility: CLINIC | Age: 82
End: 2022-02-16
Payer: MEDICARE

## 2022-02-16 LAB
ACID FAST MOD KINY STN SPEC: ABNORMAL
MYCOBACTERIUM SPEC QL CULT: ABNORMAL

## 2022-02-16 NOTE — TELEPHONE ENCOUNTER
----- Message from Paola Rao MD sent at 2/16/2022  1:44 PM CST -----  Regarding: RE: Results  Contact: Adriana #[id number]  done  ----- Message -----  From: Nahomy Sigala LPN  Sent: 2/16/2022   1:29 PM CST  To: Paola Rao MD  Subject: FW: Results                                        ----- Message -----  From: Britany Liu  Sent: 2/16/2022   1:24 PM CST  To: Maira Guevara Staff  Subject: Results                                          Calling in regards to AFB test results. Please call

## 2022-02-18 LAB
ACID FAST MOD KINY STN SPEC: ABNORMAL
MYCOBACTERIUM SPEC QL CULT: ABNORMAL
MYCOBACTERIUM SPEC QL CULT: ABNORMAL

## 2022-02-21 DIAGNOSIS — E78.5 HYPERLIPIDEMIA LDL GOAL <100: ICD-10-CM

## 2022-02-21 RX ORDER — PRAVASTATIN SODIUM 10 MG/1
10 TABLET ORAL DAILY
Qty: 90 TABLET | Refills: 0 | Status: SHIPPED | OUTPATIENT
Start: 2022-02-21 | End: 2022-08-15 | Stop reason: SDUPTHER

## 2022-02-21 NOTE — TELEPHONE ENCOUNTER
Care Due:                  Date            Visit Type   Department     Provider  --------------------------------------------------------------------------------                                 -         [facility]                              PRIMARY      MED/ INTERNAL  [facility] Steff  Last Visit: 02-      CARE ([facility])   MED/ PEDS      Josekelconnor Ferreira                              [facility]                              PRIMARY      MED/ INTERNAL  Laurentia Setff  Next Visit: 03-      CARE ([facility])   MED/ PEDS      Josekeler  Hanna                                                            Last  Test          Frequency    Reason                     Performed    Due Date  --------------------------------------------------------------------------------    Lipid Panel.  12 months..  pravastatin..............  02- 02-    Powered by [employer]. Reference number: [id number].   2/21/2022 9:43:19 AM CST

## 2022-02-21 NOTE — TELEPHONE ENCOUNTER
----- Message from Ansley Tang sent at 2/21/2022  9:34 AM CST -----  Type: RX Refill Request    Who Called:  Self    Have you contacted your pharmacy: no    Refill or New Rx: refill    RX Name and Strength:  pravastatin (PRAVACHOL) 10 MG tablet      Preferred Pharmacy with phone number:  [facility] - [place], [place] - [address]   Phone:  [phone]  Fax:  [phone]          Local or Mail Order: local    Ordering Provider: Dr Ferreira    Would the patient rather a call back or a response via [facility]? Call back    Best Call Back Number:[phone] (home)

## 2022-02-23 DIAGNOSIS — D84.9 IMMUNOSUPPRESSED STATUS: ICD-10-CM

## 2022-03-02 ENCOUNTER — LAB VISIT (OUTPATIENT)
Dept: LAB | Facility: HOSPITAL | Age: 82
End: 2022-03-02
Attending: STUDENT IN AN ORGANIZED HEALTH CARE EDUCATION/TRAINING PROGRAM
Payer: MEDICARE

## 2022-03-02 ENCOUNTER — ANTI-COAG VISIT (OUTPATIENT)
Dept: CARDIOLOGY | Facility: CLINIC | Age: 82
End: 2022-03-02
Payer: MEDICARE

## 2022-03-02 DIAGNOSIS — D68.61 ANTIPHOSPHOLIPID SYNDROME: Primary | ICD-10-CM

## 2022-03-02 DIAGNOSIS — D68.61 ANTIPHOSPHOLIPID SYNDROME: ICD-10-CM

## 2022-03-02 LAB
INR PPP: 2.6 (ref 0.8–1.2)
PROTHROMBIN TIME: 26.2 SEC (ref 9–12.5)

## 2022-03-02 PROCEDURE — 93793 PR ANTICOAGULANT MGMT FOR PT TAKING WARFARIN: ICD-10-PCS | Mod: S$GLB,,,

## 2022-03-02 PROCEDURE — 93793 ANTICOAG MGMT PT WARFARIN: CPT | Mod: S$GLB,,,

## 2022-03-02 PROCEDURE — 36415 COLL VENOUS BLD VENIPUNCTURE: CPT | Mod: PO | Performed by: STUDENT IN AN ORGANIZED HEALTH CARE EDUCATION/TRAINING PROGRAM

## 2022-03-02 PROCEDURE — 85610 PROTHROMBIN TIME: CPT | Performed by: STUDENT IN AN ORGANIZED HEALTH CARE EDUCATION/TRAINING PROGRAM

## 2022-03-03 ENCOUNTER — LAB VISIT (OUTPATIENT)
Dept: LAB | Facility: HOSPITAL | Age: 82
End: 2022-03-03
Attending: INTERNAL MEDICINE
Payer: MEDICARE

## 2022-03-03 DIAGNOSIS — A31.0 MYCOBACTERIUM AVIUM-INTRACELLULARE COMPLEX: ICD-10-CM

## 2022-03-03 PROBLEM — I15.2 HYPERTENSION ASSOCIATED WITH TYPE 2 DIABETES MELLITUS: Status: RESOLVED | Noted: 2021-01-30 | Resolved: 2022-03-03

## 2022-03-03 PROBLEM — I26.99 PULMONARY EMBOLISM, UNSPECIFIED CHRONICITY, UNSPECIFIED PULMONARY EMBOLISM TYPE, UNSPECIFIED WHETHER ACUTE COR PULMONALE PRESENT: Status: RESOLVED | Noted: 2021-02-03 | Resolved: 2022-03-03

## 2022-03-03 PROBLEM — E11.59 HYPERTENSION ASSOCIATED WITH TYPE 2 DIABETES MELLITUS: Status: RESOLVED | Noted: 2021-01-30 | Resolved: 2022-03-03

## 2022-03-03 PROCEDURE — 87015 SPECIMEN INFECT AGNT CONCNTJ: CPT | Performed by: INTERNAL MEDICINE

## 2022-03-03 PROCEDURE — 87206 SMEAR FLUORESCENT/ACID STAI: CPT | Performed by: INTERNAL MEDICINE

## 2022-03-03 PROCEDURE — 87116 MYCOBACTERIA CULTURE: CPT | Performed by: INTERNAL MEDICINE

## 2022-03-03 PROCEDURE — 87118 MYCOBACTERIC IDENTIFICATION: CPT | Performed by: INTERNAL MEDICINE

## 2022-03-03 PROCEDURE — 87149 DNA/RNA DIRECT PROBE: CPT | Performed by: INTERNAL MEDICINE

## 2022-03-04 DIAGNOSIS — Z79.01 LONG TERM (CURRENT) USE OF ANTICOAGULANTS: ICD-10-CM

## 2022-03-04 RX ORDER — WARFARIN 3 MG/1
4.5 TABLET ORAL DAILY
Qty: 45 TABLET | Refills: 11 | Status: ON HOLD | OUTPATIENT
Start: 2022-03-04 | End: 2022-05-13 | Stop reason: HOSPADM

## 2022-03-07 ENCOUNTER — LAB VISIT (OUTPATIENT)
Dept: LAB | Facility: HOSPITAL | Age: 82
End: 2022-03-07
Payer: MEDICARE

## 2022-03-07 ENCOUNTER — TELEPHONE (OUTPATIENT)
Dept: FAMILY MEDICINE | Facility: CLINIC | Age: 82
End: 2022-03-07
Payer: MEDICARE

## 2022-03-07 DIAGNOSIS — E78.5 HYPERLIPIDEMIA ASSOCIATED WITH TYPE 2 DIABETES MELLITUS: ICD-10-CM

## 2022-03-07 DIAGNOSIS — E11.59 HYPERTENSION ASSOCIATED WITH TYPE 2 DIABETES MELLITUS: ICD-10-CM

## 2022-03-07 DIAGNOSIS — E11.69 HYPERLIPIDEMIA ASSOCIATED WITH TYPE 2 DIABETES MELLITUS: ICD-10-CM

## 2022-03-07 DIAGNOSIS — E11.40 CONTROLLED TYPE 2 DIABETES WITH NEUROPATHY: ICD-10-CM

## 2022-03-07 DIAGNOSIS — E87.5 HYPERKALEMIA: Primary | ICD-10-CM

## 2022-03-07 DIAGNOSIS — I15.2 HYPERTENSION ASSOCIATED WITH TYPE 2 DIABETES MELLITUS: ICD-10-CM

## 2022-03-07 LAB
ANION GAP SERPL CALC-SCNC: 9 MMOL/L (ref 8–16)
BUN SERPL-MCNC: 10 MG/DL (ref 8–23)
CALCIUM SERPL-MCNC: 10 MG/DL (ref 8.7–10.5)
CHLORIDE SERPL-SCNC: 102 MMOL/L (ref 95–110)
CHOLEST SERPL-MCNC: 116 MG/DL (ref 120–199)
CHOLEST/HDLC SERPL: 2.7 {RATIO} (ref 2–5)
CO2 SERPL-SCNC: 30 MMOL/L (ref 23–29)
CREAT SERPL-MCNC: 0.9 MG/DL (ref 0.5–1.4)
EST. GFR  (AFRICAN AMERICAN): >60 ML/MIN/1.73 M^2
EST. GFR  (NON AFRICAN AMERICAN): >60 ML/MIN/1.73 M^2
ESTIMATED AVG GLUCOSE: 117 MG/DL (ref 68–131)
GLUCOSE SERPL-MCNC: 108 MG/DL (ref 70–110)
HBA1C MFR BLD: 5.7 % (ref 4–5.6)
HDLC SERPL-MCNC: 43 MG/DL (ref 40–75)
HDLC SERPL: 37.1 % (ref 20–50)
LDLC SERPL CALC-MCNC: 54.4 MG/DL (ref 63–159)
NONHDLC SERPL-MCNC: 73 MG/DL
POTASSIUM SERPL-SCNC: 5.4 MMOL/L (ref 3.5–5.1)
SODIUM SERPL-SCNC: 141 MMOL/L (ref 136–145)
TRIGL SERPL-MCNC: 93 MG/DL (ref 30–150)

## 2022-03-07 PROCEDURE — 80048 BASIC METABOLIC PNL TOTAL CA: CPT | Performed by: NURSE PRACTITIONER

## 2022-03-07 PROCEDURE — 36415 COLL VENOUS BLD VENIPUNCTURE: CPT | Mod: PO | Performed by: NURSE PRACTITIONER

## 2022-03-07 PROCEDURE — 83036 HEMOGLOBIN GLYCOSYLATED A1C: CPT | Performed by: NURSE PRACTITIONER

## 2022-03-07 PROCEDURE — 80061 LIPID PANEL: CPT | Performed by: NURSE PRACTITIONER

## 2022-03-07 NOTE — TELEPHONE ENCOUNTER
Please let the patient know that his potassium was a bit high on his lab work for his upcoming appointment. Often, this is a lab error and not accurate. I would like to repeat the potassium to be sure. He can have this done when he comes in to see Dr. Ferreira or before, whichever he prefers.    Thanks!  Nanci ADAMS

## 2022-03-08 ENCOUNTER — TELEPHONE (OUTPATIENT)
Dept: PULMONOLOGY | Facility: CLINIC | Age: 82
End: 2022-03-08
Payer: MEDICARE

## 2022-03-08 NOTE — TELEPHONE ENCOUNTER
----- Message from Micah Person MA sent at 3/8/2022  8:06 AM CST -----  ---- Message from Perla Delgado sent at 3/8/2022  7:51 AM CST -----  Regarding: Ptient call back  Who called:RACIEL MALCOLM [[id number]]    What is the request in detail: Patient is requesting a call back. He states he has been coughing up a little bit of blood for the past two days. He would like to further discuss.   Please advise.    Can the clinic reply by [facility]? No    Best call back number: [phone]     Additional Information: N/A

## 2022-03-08 NOTE — TELEPHONE ENCOUNTER
----- Message from Perla Delgado sent at 3/8/2022  7:51 AM CST -----  Regarding: Ptient call back  Who called:RACIEL MALCOLM [[id number]]    What is the request in detail: Patient is requesting a call back. He states he has been coughing up a little bit of blood for the past two days. He would like to further discuss.   Please advise.    Can the clinic reply by [facility]? No    Best call back number: [phone]     Additional Information: N/A

## 2022-03-11 ENCOUNTER — OFFICE VISIT (OUTPATIENT)
Dept: FAMILY MEDICINE | Facility: CLINIC | Age: 82
End: 2022-03-11
Payer: MEDICARE

## 2022-03-11 VITALS
HEIGHT: 69 IN | TEMPERATURE: 99 F | SYSTOLIC BLOOD PRESSURE: 120 MMHG | OXYGEN SATURATION: 98 % | HEART RATE: 56 BPM | DIASTOLIC BLOOD PRESSURE: 76 MMHG | WEIGHT: 163.13 LBS | BODY MASS INDEX: 24.16 KG/M2

## 2022-03-11 DIAGNOSIS — Z86.711 HISTORY OF PULMONARY EMBOLISM: ICD-10-CM

## 2022-03-11 DIAGNOSIS — R14.2 FUNCTIONAL BELCHING DISORDER: ICD-10-CM

## 2022-03-11 DIAGNOSIS — R76.0 POSITIVE CARDIOLIPIN ANTIBODIES: ICD-10-CM

## 2022-03-11 DIAGNOSIS — J43.2 CENTRILOBULAR EMPHYSEMA: ICD-10-CM

## 2022-03-11 DIAGNOSIS — I70.0 ATHEROSCLEROSIS OF AORTA: ICD-10-CM

## 2022-03-11 DIAGNOSIS — E11.69 HYPERLIPIDEMIA ASSOCIATED WITH TYPE 2 DIABETES MELLITUS: ICD-10-CM

## 2022-03-11 DIAGNOSIS — Z00.00 ROUTINE MEDICAL EXAM: Primary | ICD-10-CM

## 2022-03-11 DIAGNOSIS — E78.5 HYPERLIPIDEMIA ASSOCIATED WITH TYPE 2 DIABETES MELLITUS: ICD-10-CM

## 2022-03-11 DIAGNOSIS — E11.40 CONTROLLED TYPE 2 DIABETES WITH NEUROPATHY: ICD-10-CM

## 2022-03-11 DIAGNOSIS — G31.84 MILD COGNITIVE IMPAIRMENT: ICD-10-CM

## 2022-03-11 DIAGNOSIS — R41.841 COGNITIVE COMMUNICATION DEFICIT: ICD-10-CM

## 2022-03-11 DIAGNOSIS — J98.4 CAVITARY LUNG DISEASE: ICD-10-CM

## 2022-03-11 DIAGNOSIS — R13.12 DYSPHAGIA, OROPHARYNGEAL: ICD-10-CM

## 2022-03-11 DIAGNOSIS — D68.61 ANTIPHOSPHOLIPID SYNDROME: ICD-10-CM

## 2022-03-11 DIAGNOSIS — I27.20 MILD PULMONARY HYPERTENSION: ICD-10-CM

## 2022-03-11 DIAGNOSIS — A31.0 MAI (MYCOBACTERIUM AVIUM-INTRACELLULARE): ICD-10-CM

## 2022-03-11 PROCEDURE — 99999 PR PBB SHADOW E&M-EST. PATIENT-LVL III: CPT | Mod: PBBFAC,,, | Performed by: INTERNAL MEDICINE

## 2022-03-11 PROCEDURE — 3078F DIAST BP <80 MM HG: CPT | Mod: CPTII,S$GLB,, | Performed by: INTERNAL MEDICINE

## 2022-03-11 PROCEDURE — 1159F PR MEDICATION LIST DOCUMENTED IN MEDICAL RECORD: ICD-10-PCS | Mod: CPTII,S$GLB,, | Performed by: INTERNAL MEDICINE

## 2022-03-11 PROCEDURE — 3074F PR MOST RECENT SYSTOLIC BLOOD PRESSURE < 130 MM HG: ICD-10-PCS | Mod: CPTII,S$GLB,, | Performed by: INTERNAL MEDICINE

## 2022-03-11 PROCEDURE — 1157F ADVNC CARE PLAN IN RCRD: CPT | Mod: CPTII,S$GLB,, | Performed by: INTERNAL MEDICINE

## 2022-03-11 PROCEDURE — 1159F MED LIST DOCD IN RCRD: CPT | Mod: CPTII,S$GLB,, | Performed by: INTERNAL MEDICINE

## 2022-03-11 PROCEDURE — 99214 OFFICE O/P EST MOD 30 MIN: CPT | Mod: S$GLB,,, | Performed by: INTERNAL MEDICINE

## 2022-03-11 PROCEDURE — 1157F PR ADVANCE CARE PLAN OR EQUIV PRESENT IN MEDICAL RECORD: ICD-10-PCS | Mod: CPTII,S$GLB,, | Performed by: INTERNAL MEDICINE

## 2022-03-11 PROCEDURE — 3074F SYST BP LT 130 MM HG: CPT | Mod: CPTII,S$GLB,, | Performed by: INTERNAL MEDICINE

## 2022-03-11 PROCEDURE — 99499 UNLISTED E&M SERVICE: CPT | Mod: S$GLB,,, | Performed by: INTERNAL MEDICINE

## 2022-03-11 PROCEDURE — 1126F AMNT PAIN NOTED NONE PRSNT: CPT | Mod: CPTII,S$GLB,, | Performed by: INTERNAL MEDICINE

## 2022-03-11 PROCEDURE — 1126F PR PAIN SEVERITY QUANTIFIED, NO PAIN PRESENT: ICD-10-PCS | Mod: CPTII,S$GLB,, | Performed by: INTERNAL MEDICINE

## 2022-03-11 PROCEDURE — 1160F RVW MEDS BY RX/DR IN RCRD: CPT | Mod: CPTII,S$GLB,, | Performed by: INTERNAL MEDICINE

## 2022-03-11 PROCEDURE — 3078F PR MOST RECENT DIASTOLIC BLOOD PRESSURE < 80 MM HG: ICD-10-PCS | Mod: CPTII,S$GLB,, | Performed by: INTERNAL MEDICINE

## 2022-03-11 PROCEDURE — 99999 PR PBB SHADOW E&M-EST. PATIENT-LVL III: ICD-10-PCS | Mod: PBBFAC,,, | Performed by: INTERNAL MEDICINE

## 2022-03-11 PROCEDURE — 99214 PR OFFICE/OUTPT VISIT, EST, LEVL IV, 30-39 MIN: ICD-10-PCS | Mod: S$GLB,,, | Performed by: INTERNAL MEDICINE

## 2022-03-11 PROCEDURE — 1160F PR REVIEW ALL MEDS BY PRESCRIBER/CLIN PHARMACIST DOCUMENTED: ICD-10-PCS | Mod: CPTII,S$GLB,, | Performed by: INTERNAL MEDICINE

## 2022-03-11 PROCEDURE — 99499 RISK ADDL DX/OHS AUDIT: ICD-10-PCS | Mod: S$GLB,,, | Performed by: INTERNAL MEDICINE

## 2022-03-11 NOTE — PATIENT INSTRUCTIONS
Please be sure to minimize the intake of foods high in potassium such as bananas, oranges/orange juice, tomatoes, avocado, yoghurt, sweet potatoes and white potatoes, tomato sauce, watermelon, spinach, beets, black beans, white beans, canned salmon, butternut squash.

## 2022-03-12 NOTE — PROGRESS NOTES
HISTORY OF PRESENT ILLNESS:  Diego Gunter is a 81 y.o. male who presents to the clinic today for a routine medical physical exam. His last physical exam was approximately 1 years(s) ago.      PAST MEDICAL HISTORY:  Past Medical History:   Diagnosis Date    Antiphospholipid syndrome 2021    Dysphagia     Hx of colonic polyps     followed by GI. Dr. Pham    Hyperlipidemia LDL goal <100     Overweight(278.02)     Prostate cancer 2011    followed by urology, Dr. Rogers    Type II or unspecified type diabetes mellitus without mention of complication, not stated as uncontrolled     diet controlled       PAST SURGICAL HISTORY:  Past Surgical History:   Procedure Laterality Date    BRONCHOSCOPY N/A 10/15/2018    Procedure: BRONCHOSCOPY;  Surgeon: [facility] Diagnostic Provider;  Location: [facility] OR [address] [place] [address];  Service: Anesthesiology;  Laterality: N/A;    CATARACT EXTRACTION, BILATERAL      PROSTATECTOMY         SOCIAL HISTORY:  Social History     Socioeconomic History    Marital status:     Number of children: 2   Occupational History    Occupation: tool    Tobacco Use    Smoking status: Former Smoker     Packs/day: 0.25     Years: 5.00     Pack years: 1.25     Quit date: 10/2/1962     Years since quittin.4    Smokeless tobacco: Never Used    Tobacco comment: quit age 23   Substance and Sexual Activity    Alcohol use: Yes     Comment: beer occasionally    Drug use: No    Sexual activity: Yes     Partners: Female       FAMILY HISTORY:  Family History   Problem Relation Age of Onset    Colon cancer Mother     Diabetes Mother     Heart disease Father     Mental illness Sister     Diabetes Sister     Other Brother         polio as a child    Pulmonary fibrosis Brother     Diabetes Unknown     Diabetes Brother     Myasthenia gravis Brother     Parkinsonism Brother     Diabetes Brother     Dementia Brother     Lung cancer Brother         smoker     Diabetes Maternal Aunt        ALLERGIES AND MEDICATIONS: updated and reviewed.  Review of patient's allergies indicates:  No Known Allergies  Medication List with Changes/Refills   Current Medications    ALBUTEROL (PROVENTIL/VENTOLIN HFA) 90 MCG/ACTUATION INHALER    INHALE 2 PUFFS BY MOUTH EVERY 6 HOURS AS NEEDED FOR WHEEZING    ASCORBIC ACID, VITAMIN C, (VITAMIN C) 1000 MG TABLET    Take 1,000 mg by mouth once daily.    ASPIRIN (ECOTRIN) 81 MG EC TABLET    Take 81 mg by mouth.    B COMPLEX VITAMINS CAPSULE    Take 1 capsule by mouth once daily.    ETHAMBUTOL (MYAMBUTOL) 400 MG TAB    Take 2 tablets (800 mg total) by mouth once daily.    MUCUS CLEARING DEVICE (AEROBIKA OSCILLATING PEP SYSTM)    by Misc.(Non-Drug; Combo Route) route 2 (two) times a day.    NEBULIZER AND COMPRESSOR ([employer] COMPRESSOR SYSTEM) DANIEL    use to administer nebulized medication as directed    OMEGA 3-DHA-EPA-FISH OIL 1,000 (120-180) MG CAP    Take 1 capsule by mouth once daily.    OMEGA-3 FATTY ACIDS 1,000 MG CAP    Take 2 g by mouth.    OMEPRAZOLE 20 MG TBEC    1/2 tablet daily in am as needed.    PRAVASTATIN (PRAVACHOL) 10 MG TABLET    Take 1 tablet (10 mg total) by mouth once daily.    PROMETHAZINE-DEXTROMETHORPHAN (PROMETHAZINE-DM) 6.25-15 MG/5 ML SYRP    Take 10-15ml by mouth every 8 hours as needed for coughing    SODIUM CHLORIDE 3% 3 % NEBULIZER SOLUTION    USE 4 ML VIAL IN NEBULIZER  TWICE DAILY    WARFARIN (COUMADIN) 3 MG TABLET    Take 1.5 tablets (4.5 mg total) by mouth Daily. As directed by coumadin clinic   Discontinued Medications    FLUZONE HIGHDOSE QUAD 21-22  MCG/0.7 ML SYRG        PULSE OXIMETER (PULSE OXIMETER) DEVICE    by Apply Externally route 2 (two) times a day. Use twice daily at 8 AM and 3 PM and record the value in MyChart as directed.    PULSE OXIMETER (PULSE OXIMETER) DEVICE    by Apply Externally route 2 (two) times a day. Use twice daily at 8 AM and 3 PM and record the value in MyChart as directed.          CARE TEAM:  Patient Care Team:  Portia Ferreira MD as PCP - General (Internal Medicine)  Dustin Mccarthy III, MD as Consulting Physician (Orthopedic Surgery)  Pierre Rogers MD as Consulting Physician (Urology)  Burak Gurrola MD as Consulting Physician (Pulmonary Disease)  Theo Alvares MD as Consulting Physician (Infectious Diseases)  Suzan Glass LPN as Licensed Practical Nurse  Gilberto Silva OD as Consulting Physician (Optometry)  Thien Joy MD as Consulting Physician (Gastroenterology)  Yuan Lundberg MD as Consulting Physician (Cardiology)  Neel Jimenez MD as Consulting Physician (Neurology)           SCREENING HISTORY:  Health Maintenance       Date Due Completion Date    Hemoglobin A1c 09/07/2022 3/7/2022    Diabetes Urine Screening 09/08/2022 9/8/2021    Lipid Panel 03/07/2023 3/7/2022    TETANUS VACCINE 03/08/2025 3/8/2015            REVIEW OF SYSTEMS:   The patient reports: good dietary habits.  The patient reports : that they do not exercise regularly, but stay active.  Review of Systems   Constitutional: Negative for chills, fatigue, fever and unexpected weight change.   HENT: Negative for congestion, ear pain, sore throat and voice change.    Eyes: Negative for photophobia, pain, discharge and visual disturbance.   Respiratory: Negative for cough (- chronic; mild), shortness of breath and wheezing.    Cardiovascular: Negative for chest pain, palpitations and leg swelling.   Gastrointestinal: Negative for abdominal pain, blood in stool, constipation, diarrhea, nausea and vomiting.   Genitourinary: Negative for dysuria and frequency.   Musculoskeletal: Negative for gait problem, joint swelling and neck stiffness.   Skin: Negative for color change and rash.   Neurological: Negative for seizures, weakness and headaches.        His wife feels that his memory is getting worse. He has an appointment with neurology in the near future for further evaluation.  "  Hematological: Negative for adenopathy. Does not bruise/bleed easily.   Psychiatric/Behavioral: Negative for behavioral problems and dysphoric mood. The patient is not nervous/anxious.      ROS : patient denies: difficulty initiating urination          PHYSICAL EXAM:  Vitals:    03/11/22 1301   BP: 120/76   Pulse: (!) 56   Temp: 98.6 °F (37 °C)     Weight: 74 kg (163 lb 2.3 oz)   Height: 5' 9" (175.3 cm)   Body mass index is 24.09 kg/m².    General appearance - alert, well appearing, and in no distress, normal appearing weight  Psychiatric - alert, oriented to person, place, and time, normal behavior, speech, dress, motor activity and thought process  Eyes - pupils equal and reactive, extraocular eye movements intact, sclera anicteric  Mouth - not examined; patient wearing mask due to Covid 19 pandemic  Neck - supple, no significant adenopathy, carotids upstroke normal bilaterally, no bruits  Lymphatics - no palpable cervical lymphadenopathy  Chest - clear to auscultation, no wheezes, rales or rhonchi, symmetric air entry  Heart - normal rate and regular rhythm, no gallops noted  Neurological - alert, normal speech, no focal findings or movement disorder noted, cranial nerves II through XII intact  Musculoskeletal - patient noted to have Mild-Moderate osteoarthritic changes to both knee joints. No joint effusions noted., no muscular tenderness noted  Extremities - no pedal edema noted  Skin - normal coloration, no suspicious skin lesions      Labs:  Lab Results   Component Value Date    HGBA1C 5.7 (H) 03/07/2022    HGBA1C 5.6 09/08/2021    HGBA1C 5.7 (H) 02/25/2021      Lab Results   Component Value Date    CHOL 116 (L) 03/07/2022    CHOL 127 02/25/2021    CHOL 144 02/26/2020     Lab Results   Component Value Date    LDLCALC 54.4 (L) 03/07/2022    LDLCALC 67.4 02/25/2021    LDLCALC 87.4 02/26/2020           ASSESSMENT AND PLAN:  1. Routine medical exam  Counseled on age appropriate medical preventative services " including age appropriate cancer screenings, age appropriate eye and dental exams, over all nutritional health, need for a consistent exercise regimen, and an over all push towards maintaining a vigorous and active lifestyle.  Counseled on age appropriate vaccines and discussed upcoming health care needs based on age/gender. Discussed good sleep hygiene and stress management.    2. Controlled type 2 diabetes with neuropathy  The patient has only have 1 elevated it is A1c past.  He has never been on medication.  He should probably be classified as prediabetic.  His neuropathy is likely idiopathic.  He will continue with low sugar/low diabetic diet to prevent progression.    3. Mild pulmonary hypertension  Stable. Asymptomatic. Observe.    4. Hyperlipidemia associated with type 2 diabetes mellitus  We discussed low fat diet and regular exercise.The current medical regimen is effective;  continue present plan and medications.     5. Atherosclerosis of aorta  Patient with Atherosclerosis of the Aorta.  Stable/asymptomatic. Currently stable on lipid lowering medication and blood pressure monitoring.    6. HILLARY (mycobacterium avium-intracellulare)/7. Cavitary lung disease/8. Centrilobular emphysema  The current medical regimen is effective;  continue present plan and medications.   Followed by: Pulmonology and Infectious Disease.     9. Positive cardiolipin antibodies/10. Antiphospholipid syndrome/15. History of pulmonary embolism  The patient currently denies any symptoms related to possible blood clots.  He is on Coumadin.  Followed by Hematology.    11. 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  Observe.    13. Mild cognitive impairment/14. Cognitive communication deficit  He has an appointment with neurology in the near future for further evaluation.    He was noted to have a slightly elevated potassium level.  We discussed minimizing potassium in his diet.  He has normal kidney function.  He  is not on an ACE inhibitor or ARB.  Observe for now.           No orders of the defined types were placed in this encounter.      Follow up in about 6 months (around 9/11/2022), or if symptoms worsen or fail to improve, for follow up chronic medical conditions.. or sooner as needed.

## 2022-03-14 ENCOUNTER — OFFICE VISIT (OUTPATIENT)
Dept: OTOLARYNGOLOGY | Facility: CLINIC | Age: 82
End: 2022-03-14
Payer: MEDICARE

## 2022-03-14 VITALS
HEIGHT: 69 IN | DIASTOLIC BLOOD PRESSURE: 72 MMHG | SYSTOLIC BLOOD PRESSURE: 102 MMHG | WEIGHT: 164.44 LBS | BODY MASS INDEX: 24.36 KG/M2

## 2022-03-14 DIAGNOSIS — H90.3 SENSORINEURAL HEARING LOSS (SNHL) OF BOTH EARS: Primary | ICD-10-CM

## 2022-03-14 DIAGNOSIS — H93.13 TINNITUS OF BOTH EARS: ICD-10-CM

## 2022-03-14 PROCEDURE — 99213 OFFICE O/P EST LOW 20 MIN: CPT | Mod: S$GLB,,, | Performed by: OTOLARYNGOLOGY

## 2022-03-14 PROCEDURE — 3078F PR MOST RECENT DIASTOLIC BLOOD PRESSURE < 80 MM HG: ICD-10-PCS | Mod: CPTII,S$GLB,, | Performed by: OTOLARYNGOLOGY

## 2022-03-14 PROCEDURE — 1126F AMNT PAIN NOTED NONE PRSNT: CPT | Mod: CPTII,S$GLB,, | Performed by: OTOLARYNGOLOGY

## 2022-03-14 PROCEDURE — 1101F PT FALLS ASSESS-DOCD LE1/YR: CPT | Mod: CPTII,S$GLB,, | Performed by: OTOLARYNGOLOGY

## 2022-03-14 PROCEDURE — 3078F DIAST BP <80 MM HG: CPT | Mod: CPTII,S$GLB,, | Performed by: OTOLARYNGOLOGY

## 2022-03-14 PROCEDURE — 3288F FALL RISK ASSESSMENT DOCD: CPT | Mod: CPTII,S$GLB,, | Performed by: OTOLARYNGOLOGY

## 2022-03-14 PROCEDURE — 1157F ADVNC CARE PLAN IN RCRD: CPT | Mod: CPTII,S$GLB,, | Performed by: OTOLARYNGOLOGY

## 2022-03-14 PROCEDURE — 3074F SYST BP LT 130 MM HG: CPT | Mod: CPTII,S$GLB,, | Performed by: OTOLARYNGOLOGY

## 2022-03-14 PROCEDURE — 99213 PR OFFICE/OUTPT VISIT, EST, LEVL III, 20-29 MIN: ICD-10-PCS | Mod: S$GLB,,, | Performed by: OTOLARYNGOLOGY

## 2022-03-14 PROCEDURE — 3288F PR FALLS RISK ASSESSMENT DOCUMENTED: ICD-10-PCS | Mod: CPTII,S$GLB,, | Performed by: OTOLARYNGOLOGY

## 2022-03-14 PROCEDURE — 1126F PR PAIN SEVERITY QUANTIFIED, NO PAIN PRESENT: ICD-10-PCS | Mod: CPTII,S$GLB,, | Performed by: OTOLARYNGOLOGY

## 2022-03-14 PROCEDURE — 1157F PR ADVANCE CARE PLAN OR EQUIV PRESENT IN MEDICAL RECORD: ICD-10-PCS | Mod: CPTII,S$GLB,, | Performed by: OTOLARYNGOLOGY

## 2022-03-14 PROCEDURE — 1101F PR PT FALLS ASSESS DOC 0-1 FALLS W/OUT INJ PAST YR: ICD-10-PCS | Mod: CPTII,S$GLB,, | Performed by: OTOLARYNGOLOGY

## 2022-03-14 PROCEDURE — 3074F PR MOST RECENT SYSTOLIC BLOOD PRESSURE < 130 MM HG: ICD-10-PCS | Mod: CPTII,S$GLB,, | Performed by: OTOLARYNGOLOGY

## 2022-03-14 NOTE — PROGRESS NOTES
OTOLARYNGOLOGY CLINIC NOTE  Date:  03/14/2022     Chief complaint:  Chief Complaint   Patient presents with    Other     Hearing test results.       History of Present Illness  Diego Gunter is a 81 y.o. male  presenting today for a new evaluation and treatment of hearing loss.  Hearing loss has been gradual. He has bilateral nonpulsatile tinnitus as well. No otalgia. No otorrhea  Was seen by my colleague dr vargas for ear cleaning on 1-25-22 prior to hearing test and he is here today for hearing test review.     Past Medical History  Past Medical History:   Diagnosis Date    Antiphospholipid syndrome 11/19/2021    Dysphagia     Hx of colonic polyps     followed by GI. Dr. Pham    Hyperlipidemia LDL goal <100     Overweight(278.02)     Prostate cancer september 2011    followed by urology, Dr. Rogers    Type II or unspecified type diabetes mellitus without mention of complication, not stated as uncontrolled     diet controlled        Past Surgical History  Past Surgical History:   Procedure Laterality Date    BRONCHOSCOPY N/A 10/15/2018    Procedure: BRONCHOSCOPY;  [facility];  Location: [facility] OR [address] [place] [address];  Service: Anesthesiology;  Laterality: N/A;    CATARACT EXTRACTION, BILATERAL  2014    PROSTATECTOMY          Medications  Current Outpatient Medications on File Prior to Visit   Medication Sig Dispense Refill    albuterol (PROVENTIL/VENTOLIN HFA) 90 mcg/actuation inhaler INHALE 2 PUFFS BY MOUTH EVERY 6 HOURS AS NEEDED FOR WHEEZING 9 g 0    ascorbic acid, vitamin C, (VITAMIN C) 1000 MG tablet Take 1,000 mg by mouth once daily.      aspirin (ECOTRIN) 81 MG EC tablet Take 81 mg by mouth.      b complex vitamins capsule Take 1 capsule by mouth once daily.      ethambutoL (MYAMBUTOL) 400 MG Tab Take 2 tablets (800 mg total) by mouth once daily. 60 tablet 5    mucus clearing device (AEROBIKA OSCILLATING PEP SYSTM) by Misc.(Non-Drug; Combo Route) route 2 (two) times a day.  1 Device 0    nebulizer and compressor (OMBRA COMPRESSOR SYSTEM) Marcy use to administer nebulized medication as directed 1 each 0    omega 3-dha-epa-fish oil 1,000 (120-180) mg Cap Take 1 capsule by mouth once daily.      omega-3 fatty acids 1,000 mg Cap Take 2 g by mouth.      omeprazole 20 mg TbEC 1/2 tablet daily in am as needed. 45 each 0    pravastatin (PRAVACHOL) 10 MG tablet Take 1 tablet (10 mg total) by mouth once daily. 90 tablet 0    promethazine-dextromethorphan (PROMETHAZINE-DM) 6.25-15 mg/5 mL Syrp Take 10-15ml by mouth every 8 hours as needed for coughing 240 mL 0    sodium chloride 3% 3 % nebulizer solution USE 4 ML VIAL IN NEBULIZER  TWICE DAILY 480 mL 11    warfarin (COUMADIN) 3 MG tablet Take 1.5 tablets (4.5 mg total) by mouth Daily. As directed by coumadin clinic 45 tablet 11     No current facility-administered medications on file prior to visit.       Review of Systems  Review of Systems   Constitutional: Negative.    Eyes: Negative.    Respiratory: Negative.    Cardiovascular: Negative.    Gastrointestinal: Negative.    Genitourinary: Negative.    Musculoskeletal: Negative.    Skin: Negative.    Neurological: Negative.    Psychiatric/Behavioral: Negative.     Answers for HPI/ROS submitted by the patient on 3/8/2022  Voice Change?: Yes  Cold all of the time? : Yes    Social History   reports that he quit smoking about 59 years ago. He has a 1.25 pack-year smoking history. He has never used smokeless tobacco. He reports current alcohol use. He reports that he does not use drugs.     Family History  Family History   Problem Relation Age of Onset    Colon cancer Mother     Diabetes Mother     Heart disease Father     Mental illness Sister     Diabetes Sister     Other Brother         polio as a child    Pulmonary fibrosis Brother     Diabetes Unknown     Diabetes Brother     Myasthenia gravis Brother     Parkinsonism Brother     Diabetes Brother     Dementia Brother     Lung  cancer Brother         smoker    Diabetes Maternal Aunt         Physical Exam   Vitals:    03/14/22 1441   BP: 102/72    Body mass index is 24.29 kg/m².          GENERAL: no acute distress.  HEAD: normocephalic.   EYES: lids and lashes normal. No scleral icterus  EARS: external ear without lesion, normal pinna shape and position.  External auditory canal with normal cerumen, tympanic membrane fully visible, no perforation , no retraction. No middle ear effusion. Ossicles intact.   NOSE: external nose without significant bony abnormality  ORAL CAVITY/OROPHARYNX: tongue  mobile.  NECK: trachea midline.   RESPIRATORY: no stridor, no stertor. Voice normal. Respirations nonlabored.  NEURO: alert, responds to questions appropriately.   PSYCH:mood appropriate    AUDIOLOGY RESULTS  Audiometric evaluation including audiogram, tympanometry, acoustic reflexes, and speech discrimination which was performed 1-25-22 was personally reviewed and interpreted.  Notable findings on the audiogram were mild sloping to moderate and then severe sensorineural hearing loss (SNHL).  Tympanometry revealed Type A tympanogram on the left and Type As tympanogram on the right. Speech discrimination was 92%  on the left, and 100% on the right.     Report of the audiologist performing this audiometric testing was also reviewed     Imaging:  The patient does not have any  recent imaging of the head and neck.     Labs:  CBC  Recent Labs   Lab 02/04/21  0506 11/12/21  1002 11/29/21  0940   WBC 7.48 9.23 7.97   Hemoglobin 13.6 L 13.5 L 13.8 L   Hematocrit 41.7 43.8 44.6   MCV 86 88 86   Platelets 305 141 L 234     BMP  Recent Labs   Lab 01/31/21  0521 02/01/21  0217 02/02/21  0439 02/03/21  0433 02/04/21  0506 03/01/21  1132 11/29/21  0940 01/13/22  0920 03/07/22  0915   Glucose 89   < > 130 H 123 H 98   < > 135 H 165 H 108   Sodium 144   < > 143 147 H 143   < > 141 140 141   Potassium 4.3   < > 4.0 4.0 3.8   < > 4.7 4.3 5.4 H   Chloride 107   < > 108  111 H 109   < > 103 102 102   CO2 28   < > 25 26 24   < > 30 H 31 H 30 H   BUN 6 L   < > 11 12 12   < > 10 9 10   Creatinine 0.8   < > 0.8 0.8 0.8   < > 1.0 1.0 0.9   Calcium 8.6 L   < > 8.6 L 8.7 8.4 L   < > 10.3 9.4 10.0   Phosphorus 3.3  --   --   --   --   --   --   --   --    Magnesium 2.0   < > 2.0 2.1 2.0  --   --   --   --     < > = values in this interval not displayed.     COAGS  Recent Labs   Lab 02/07/22  0716 02/14/22  0708 03/02/22  0706   INR 2.9 H 2.9 H 2.6 H       Assessment  1. Sensorineural hearing loss (SNHL) of both ears    2. Tinnitus of both ears       Plan:  Discussed plan of care with patient in detail and all questions answered. Patient reported understanding of plan of care.     Bilateral sensorineural hearing loss: medically cleared for hearing amplification if desired. Findings consistent with age related/noise induced hearing loss (presbycusis) . Counseled patient that  formal audiogram should be done q1-2 years and immediately if has a sudden loss of hearing. Pt should wear noise protection when in an environment with loud noise exposure to protect from future hearing loss. Patient  has people's health insurance. instructed patient to call insurance company to see if they are a candidate for discount hearing aid at [facility] location. Can also fit for HA at [facility] [address] location but no discount ability ( no audiologist at [facility]).    I reviewed the hearing test results and meaning of test components. I explained about pathophysiology of hearing loss and expectations for prognosis    Tinnitus: This is due to patient's documented SNHL. There are no factors that indicate need to further work this up ( I.e. tinnitus is not unilateral and is not pulsatile in nature). We discussed that there are no medications/herbs/supplements approved by the academy of otolaryngology for treatment of tinnitus. Tinnitus retraining cognitive therapy has been shown to be of benefit in  patients and this was offered to the patient. Additionally, tinnitus masking strategies discussed as well as the benefit that hearing amplification can provide with decreasing severity of tinnitus.     F/u as needed for any issues  Please be aware that this note has been generated with the assistance of [employer] voice-to-text.  Please excuse any spelling or grammatical errors.

## 2022-03-16 ENCOUNTER — LAB VISIT (OUTPATIENT)
Dept: LAB | Facility: HOSPITAL | Age: 82
End: 2022-03-16
Attending: STUDENT IN AN ORGANIZED HEALTH CARE EDUCATION/TRAINING PROGRAM
Payer: MEDICARE

## 2022-03-16 ENCOUNTER — TELEPHONE (OUTPATIENT)
Dept: PULMONOLOGY | Facility: CLINIC | Age: 82
End: 2022-03-16
Payer: MEDICARE

## 2022-03-16 ENCOUNTER — ANTI-COAG VISIT (OUTPATIENT)
Dept: CARDIOLOGY | Facility: CLINIC | Age: 82
End: 2022-03-16
Payer: MEDICARE

## 2022-03-16 DIAGNOSIS — D68.61 ANTIPHOSPHOLIPID SYNDROME: ICD-10-CM

## 2022-03-16 DIAGNOSIS — Z86.711 HISTORY OF PULMONARY EMBOLISM: Primary | ICD-10-CM

## 2022-03-16 LAB
INR PPP: 3.2 (ref 0.8–1.2)
PROTHROMBIN TIME: 32.4 SEC (ref 9–12.5)

## 2022-03-16 PROCEDURE — 93793 ANTICOAG MGMT PT WARFARIN: CPT | Mod: S$GLB,,,

## 2022-03-16 PROCEDURE — 93793 PR ANTICOAGULANT MGMT FOR PT TAKING WARFARIN: ICD-10-PCS | Mod: S$GLB,,,

## 2022-03-16 PROCEDURE — 36415 COLL VENOUS BLD VENIPUNCTURE: CPT | Mod: PO | Performed by: STUDENT IN AN ORGANIZED HEALTH CARE EDUCATION/TRAINING PROGRAM

## 2022-03-16 PROCEDURE — 85610 PROTHROMBIN TIME: CPT | Performed by: STUDENT IN AN ORGANIZED HEALTH CARE EDUCATION/TRAINING PROGRAM

## 2022-03-16 NOTE — TELEPHONE ENCOUNTER
Spoke with patient he states he is doing fine no more coughing up blood it was just that one time and it was very little. He said he will let me know if it happens again.                  ----- Message from Micah Person MA sent at 3/8/2022  8:06 AM CST -----  ---- Message from Perla Delgado sent at 3/8/2022  7:51 AM CST -----  Regarding: Ptient call back  Who called:RACIEL MALCOLM [[id number]]    What is the request in detail: Patient is requesting a call back. He states he has been coughing up a little bit of blood for the past two days. He would like to further discuss.   Please advise.    Can the clinic reply by [facility]? No    Best call back number: [phone]     Additional Information: N/A

## 2022-03-16 NOTE — PROGRESS NOTES
Wife called and was given lab result, she verified correct coumadin dose, reports no changes, wife was given coumadin dose change and next lab date, sheverbalized understanding

## 2022-03-18 ENCOUNTER — LAB VISIT (OUTPATIENT)
Dept: LAB | Facility: HOSPITAL | Age: 82
End: 2022-03-18
Attending: NEUROLOGICAL SURGERY
Payer: MEDICARE

## 2022-03-18 ENCOUNTER — OFFICE VISIT (OUTPATIENT)
Dept: NEUROLOGY | Facility: CLINIC | Age: 82
End: 2022-03-18
Payer: MEDICARE

## 2022-03-18 VITALS
HEIGHT: 69 IN | SYSTOLIC BLOOD PRESSURE: 118 MMHG | WEIGHT: 159.19 LBS | BODY MASS INDEX: 23.58 KG/M2 | DIASTOLIC BLOOD PRESSURE: 68 MMHG | HEART RATE: 55 BPM

## 2022-03-18 DIAGNOSIS — R41.3 OTHER AMNESIA: Primary | ICD-10-CM

## 2022-03-18 DIAGNOSIS — R41.3 OTHER AMNESIA: ICD-10-CM

## 2022-03-18 LAB — TSH SERPL DL<=0.005 MIU/L-ACNC: 3.45 UIU/ML (ref 0.4–4)

## 2022-03-18 PROCEDURE — 1101F PR PT FALLS ASSESS DOC 0-1 FALLS W/OUT INJ PAST YR: ICD-10-PCS | Mod: CPTII,S$GLB,, | Performed by: NEUROLOGICAL SURGERY

## 2022-03-18 PROCEDURE — 99214 PR OFFICE/OUTPT VISIT, EST, LEVL IV, 30-39 MIN: ICD-10-PCS | Mod: S$GLB,,, | Performed by: NEUROLOGICAL SURGERY

## 2022-03-18 PROCEDURE — 1126F PR PAIN SEVERITY QUANTIFIED, NO PAIN PRESENT: ICD-10-PCS | Mod: CPTII,S$GLB,, | Performed by: NEUROLOGICAL SURGERY

## 2022-03-18 PROCEDURE — 3074F SYST BP LT 130 MM HG: CPT | Mod: CPTII,S$GLB,, | Performed by: NEUROLOGICAL SURGERY

## 2022-03-18 PROCEDURE — 3078F PR MOST RECENT DIASTOLIC BLOOD PRESSURE < 80 MM HG: ICD-10-PCS | Mod: CPTII,S$GLB,, | Performed by: NEUROLOGICAL SURGERY

## 2022-03-18 PROCEDURE — 84443 ASSAY THYROID STIM HORMONE: CPT | Performed by: NEUROLOGICAL SURGERY

## 2022-03-18 PROCEDURE — 3074F PR MOST RECENT SYSTOLIC BLOOD PRESSURE < 130 MM HG: ICD-10-PCS | Mod: CPTII,S$GLB,, | Performed by: NEUROLOGICAL SURGERY

## 2022-03-18 PROCEDURE — 99999 PR PBB SHADOW E&M-EST. PATIENT-LVL IV: ICD-10-PCS | Mod: PBBFAC,,, | Performed by: NEUROLOGICAL SURGERY

## 2022-03-18 PROCEDURE — 83921 ORGANIC ACID SINGLE QUANT: CPT | Performed by: NEUROLOGICAL SURGERY

## 2022-03-18 PROCEDURE — 1126F AMNT PAIN NOTED NONE PRSNT: CPT | Mod: CPTII,S$GLB,, | Performed by: NEUROLOGICAL SURGERY

## 2022-03-18 PROCEDURE — 99999 PR PBB SHADOW E&M-EST. PATIENT-LVL IV: CPT | Mod: PBBFAC,,, | Performed by: NEUROLOGICAL SURGERY

## 2022-03-18 PROCEDURE — 3288F PR FALLS RISK ASSESSMENT DOCUMENTED: ICD-10-PCS | Mod: CPTII,S$GLB,, | Performed by: NEUROLOGICAL SURGERY

## 2022-03-18 PROCEDURE — 1101F PT FALLS ASSESS-DOCD LE1/YR: CPT | Mod: CPTII,S$GLB,, | Performed by: NEUROLOGICAL SURGERY

## 2022-03-18 PROCEDURE — 36415 COLL VENOUS BLD VENIPUNCTURE: CPT | Performed by: NEUROLOGICAL SURGERY

## 2022-03-18 PROCEDURE — 1157F PR ADVANCE CARE PLAN OR EQUIV PRESENT IN MEDICAL RECORD: ICD-10-PCS | Mod: CPTII,S$GLB,, | Performed by: NEUROLOGICAL SURGERY

## 2022-03-18 PROCEDURE — 3288F FALL RISK ASSESSMENT DOCD: CPT | Mod: CPTII,S$GLB,, | Performed by: NEUROLOGICAL SURGERY

## 2022-03-18 PROCEDURE — 82607 VITAMIN B-12: CPT | Performed by: NEUROLOGICAL SURGERY

## 2022-03-18 PROCEDURE — 99214 OFFICE O/P EST MOD 30 MIN: CPT | Mod: S$GLB,,, | Performed by: NEUROLOGICAL SURGERY

## 2022-03-18 PROCEDURE — 3078F DIAST BP <80 MM HG: CPT | Mod: CPTII,S$GLB,, | Performed by: NEUROLOGICAL SURGERY

## 2022-03-18 PROCEDURE — 1157F ADVNC CARE PLAN IN RCRD: CPT | Mod: CPTII,S$GLB,, | Performed by: NEUROLOGICAL SURGERY

## 2022-03-19 LAB — VIT B12 SERPL-MCNC: 628 PG/ML (ref 210–950)

## 2022-03-22 ENCOUNTER — PATIENT MESSAGE (OUTPATIENT)
Dept: FAMILY MEDICINE | Facility: CLINIC | Age: 82
End: 2022-03-22
Payer: MEDICARE

## 2022-03-24 ENCOUNTER — LAB VISIT (OUTPATIENT)
Dept: LAB | Facility: HOSPITAL | Age: 82
End: 2022-03-24
Attending: INTERNAL MEDICINE
Payer: MEDICARE

## 2022-03-24 DIAGNOSIS — A31.0 MYCOBACTERIUM AVIUM-INTRACELLULARE COMPLEX: ICD-10-CM

## 2022-03-24 LAB — METHYLMALONATE SERPL-SCNC: 0.18 UMOL/L

## 2022-03-24 PROCEDURE — 87206 SMEAR FLUORESCENT/ACID STAI: CPT | Performed by: INTERNAL MEDICINE

## 2022-03-24 PROCEDURE — 87116 MYCOBACTERIA CULTURE: CPT | Performed by: INTERNAL MEDICINE

## 2022-03-24 PROCEDURE — 87149 DNA/RNA DIRECT PROBE: CPT | Performed by: INTERNAL MEDICINE

## 2022-03-24 PROCEDURE — 87118 MYCOBACTERIC IDENTIFICATION: CPT | Performed by: INTERNAL MEDICINE

## 2022-03-24 PROCEDURE — 87015 SPECIMEN INFECT AGNT CONCNTJ: CPT | Performed by: INTERNAL MEDICINE

## 2022-03-25 LAB — M TB DNA SPEC QL NAA+PROBE: ABNORMAL

## 2022-03-26 ENCOUNTER — HOSPITAL ENCOUNTER (OUTPATIENT)
Dept: RADIOLOGY | Facility: HOSPITAL | Age: 82
Discharge: HOME | End: 2022-03-26
Attending: NEUROLOGICAL SURGERY
Payer: MEDICARE

## 2022-03-26 DIAGNOSIS — R41.3 OTHER AMNESIA: ICD-10-CM

## 2022-03-26 PROCEDURE — 70551 MRI BRAIN WITHOUT CONTRAST: ICD-10-PCS | Mod: 26,,, | Performed by: RADIOLOGY

## 2022-03-26 PROCEDURE — 70551 MRI BRAIN STEM W/O DYE: CPT | Mod: TC

## 2022-03-26 PROCEDURE — 70551 MRI BRAIN STEM W/O DYE: CPT | Mod: 26,,, | Performed by: RADIOLOGY

## 2022-03-28 ENCOUNTER — LAB VISIT (OUTPATIENT)
Dept: LAB | Facility: HOSPITAL | Age: 82
End: 2022-03-28
Attending: STUDENT IN AN ORGANIZED HEALTH CARE EDUCATION/TRAINING PROGRAM
Payer: MEDICARE

## 2022-03-28 ENCOUNTER — ANTI-COAG VISIT (OUTPATIENT)
Dept: CARDIOLOGY | Facility: CLINIC | Age: 82
End: 2022-03-28
Payer: MEDICARE

## 2022-03-28 DIAGNOSIS — D68.61 ANTIPHOSPHOLIPID SYNDROME: ICD-10-CM

## 2022-03-28 DIAGNOSIS — Z86.711 HISTORY OF PULMONARY EMBOLISM: Primary | ICD-10-CM

## 2022-03-28 LAB
INR PPP: 3.1 (ref 0.8–1.2)
PROTHROMBIN TIME: 31.4 SEC (ref 9–12.5)

## 2022-03-28 PROCEDURE — 93793 PR ANTICOAGULANT MGMT FOR PT TAKING WARFARIN: ICD-10-PCS | Mod: S$GLB,,,

## 2022-03-28 PROCEDURE — 36415 COLL VENOUS BLD VENIPUNCTURE: CPT | Mod: PO | Performed by: STUDENT IN AN ORGANIZED HEALTH CARE EDUCATION/TRAINING PROGRAM

## 2022-03-28 PROCEDURE — 93793 ANTICOAG MGMT PT WARFARIN: CPT | Mod: S$GLB,,,

## 2022-03-28 PROCEDURE — 85610 PROTHROMBIN TIME: CPT | Performed by: STUDENT IN AN ORGANIZED HEALTH CARE EDUCATION/TRAINING PROGRAM

## 2022-03-29 ENCOUNTER — PATIENT MESSAGE (OUTPATIENT)
Dept: HEMATOLOGY/ONCOLOGY | Facility: CLINIC | Age: 82
End: 2022-03-29
Payer: MEDICARE

## 2022-03-31 ENCOUNTER — TELEPHONE (OUTPATIENT)
Dept: NEUROLOGY | Facility: CLINIC | Age: 82
End: 2022-03-31
Payer: MEDICARE

## 2022-03-31 NOTE — TELEPHONE ENCOUNTER
----- Message from Sarah Mirza sent at 3/31/2022 11:45 AM CDT -----  Regarding: Test  Name of Who is Calling:RACIEL MALCOLM [[id number]]          What is the request in detail: Patient is requesting a call back in reference to a sleep test           Can the clinic reply by [facility]: no           What Number to Call Back [facility]:[phone]    Can you tell me the results of the MRI so I can call the wife back and let her know     Left message to call office

## 2022-04-01 ENCOUNTER — TELEPHONE (OUTPATIENT)
Dept: NEUROLOGY | Facility: CLINIC | Age: 82
End: 2022-04-01
Payer: MEDICARE

## 2022-04-01 NOTE — TELEPHONE ENCOUNTER
Called and spoke with wife and informed her there were no changes since last year.  She verbalized understanding

## 2022-04-01 NOTE — TELEPHONE ENCOUNTER
----- Message from Barrett Khan MA sent at 4/1/2022  2:35 PM CDT -----  Type:  Patient Returning Call    Who Called: self    Who Left Message for Patient: Ladan    Does the patient know what this is regarding?:yes, MRI results     Would the patient rather a call back or a response via [facility]? yes    Best Call Back Number:[phone] (home)

## 2022-04-06 ENCOUNTER — TELEPHONE (OUTPATIENT)
Dept: FAMILY MEDICINE | Facility: CLINIC | Age: 82
End: 2022-04-06
Payer: MEDICARE

## 2022-04-06 NOTE — TELEPHONE ENCOUNTER
Spoke with the Patient wife about scheduling an EAWV appointment.  Scheduled the Patient for 04/18/22 @ 7:30am.  Patient verbally understands.

## 2022-04-07 ENCOUNTER — TELEPHONE (OUTPATIENT)
Dept: FAMILY MEDICINE | Facility: CLINIC | Age: 82
End: 2022-04-07
Payer: MEDICARE

## 2022-04-07 NOTE — TELEPHONE ENCOUNTER
Spoke with the Patient to reschedule EAWV OV on 4/18/22 @ 7:30am.  Rescheduled for 04/21/22 @ 7:30am.  Patient verbally understands.

## 2022-04-08 LAB — ACID FAST SUSCEPTIBILITY, SLOW GROWER: ABNORMAL

## 2022-04-11 ENCOUNTER — ANTI-COAG VISIT (OUTPATIENT)
Dept: CARDIOLOGY | Facility: CLINIC | Age: 82
End: 2022-04-11
Payer: MEDICARE

## 2022-04-11 ENCOUNTER — LAB VISIT (OUTPATIENT)
Dept: LAB | Facility: HOSPITAL | Age: 82
End: 2022-04-11
Attending: STUDENT IN AN ORGANIZED HEALTH CARE EDUCATION/TRAINING PROGRAM
Payer: MEDICARE

## 2022-04-11 DIAGNOSIS — Z86.711 HISTORY OF PULMONARY EMBOLISM: Primary | ICD-10-CM

## 2022-04-11 DIAGNOSIS — D68.61 ANTIPHOSPHOLIPID SYNDROME: ICD-10-CM

## 2022-04-11 LAB
INR PPP: 2.7 (ref 0.8–1.2)
PROTHROMBIN TIME: 27.4 SEC (ref 9–12.5)

## 2022-04-11 PROCEDURE — 36415 COLL VENOUS BLD VENIPUNCTURE: CPT | Mod: PO | Performed by: STUDENT IN AN ORGANIZED HEALTH CARE EDUCATION/TRAINING PROGRAM

## 2022-04-11 PROCEDURE — 93793 PR ANTICOAGULANT MGMT FOR PT TAKING WARFARIN: ICD-10-PCS | Mod: S$GLB,,,

## 2022-04-11 PROCEDURE — 93793 ANTICOAG MGMT PT WARFARIN: CPT | Mod: S$GLB,,,

## 2022-04-11 PROCEDURE — 85610 PROTHROMBIN TIME: CPT | Performed by: STUDENT IN AN ORGANIZED HEALTH CARE EDUCATION/TRAINING PROGRAM

## 2022-04-14 ENCOUNTER — LAB VISIT (OUTPATIENT)
Dept: LAB | Facility: HOSPITAL | Age: 82
End: 2022-04-14
Attending: INTERNAL MEDICINE
Payer: MEDICARE

## 2022-04-14 DIAGNOSIS — A31.0 MYCOBACTERIUM AVIUM-INTRACELLULARE COMPLEX: ICD-10-CM

## 2022-04-14 PROCEDURE — 87116 MYCOBACTERIA CULTURE: CPT | Performed by: INTERNAL MEDICINE

## 2022-04-14 PROCEDURE — 87118 MYCOBACTERIC IDENTIFICATION: CPT | Mod: 59 | Performed by: INTERNAL MEDICINE

## 2022-04-14 PROCEDURE — 87206 SMEAR FLUORESCENT/ACID STAI: CPT | Performed by: INTERNAL MEDICINE

## 2022-04-14 PROCEDURE — 87015 SPECIMEN INFECT AGNT CONCNTJ: CPT | Performed by: INTERNAL MEDICINE

## 2022-04-14 PROCEDURE — 87149 DNA/RNA DIRECT PROBE: CPT | Performed by: INTERNAL MEDICINE

## 2022-04-22 ENCOUNTER — PES CALL (OUTPATIENT)
Dept: ADMINISTRATIVE | Facility: CLINIC | Age: 82
End: 2022-04-22
Payer: MEDICARE

## 2022-04-24 NOTE — PROGRESS NOTES
Chief Complaint   Patient presents with    Neurologic Problem        Diego Gunter is a 81 y.o. male with a history of multiple medical diagnoses as listed below that presents for evaluation of memory loss, speech difficulty, and history of stroke.  The patient is accompanied by his wife who helps to provide the history.  He has been having some difficulty with speaking as he has been unable to express himself clearly or quickly.  He says he feels like he knows what he wants to say but cannot get his words out to express himself quite frequently.  He says that specially when he has been around other couples he finds it has been hard to get his words in because the conversation moves too quickly for him to keep up.  In the past neuropsychology evaluation recommended speech therapy to address these difficulties with speaking but this was never set up.  He has been able to care for his own activities of daily living and his instrumental activities of daily living without any assistance.  He has been taking his medication as directed with no complaints of side effects.    Interval History  02/19/2021  He has been feeling down recently about the death of his brother and sister within about a 2 week span of each other.  He has still been very tearful and saddened by this loss.  He says that he has been trying to immerse himself with activities around the home and also has been trying to stay closer to his immediate family including his grandchildren to try to keep his spirits up.    09/23/2021  He says that he continues to have problems with his memory and has been having increased problems with his mobility. In crowded environments he seems to be having increased problems with his walking and his balance. He has still had problems with his mood as well.    03/18/2022  He has continued to have trouble with his short term memory. Level of functioning around the home has been about the same. Mobility has been impaired  and has been a limitation in caring for his ADLs..    PAST MEDICAL HISTORY:  Past Medical History:   Diagnosis Date    Antiphospholipid syndrome 2021    Dysphagia     Hx of colonic polyps     followed by GI. Dr. Pham    Hyperlipidemia LDL goal <100     Overweight(278.02)     Prostate cancer 2011    followed by urology, Dr. Rogers    Type II or unspecified type diabetes mellitus without mention of complication, not stated as uncontrolled     diet controlled       PAST SURGICAL HISTORY:  Past Surgical History:   Procedure Laterality Date    BRONCHOSCOPY N/A 10/15/2018    Procedure: BRONCHOSCOPY;  Surgeon: [facility] Diagnostic Provider;  Location: [facility] OR [address] [place] [address];  Service: Anesthesiology;  Laterality: N/A;    CATARACT EXTRACTION, BILATERAL      PROSTATECTOMY         SOCIAL HISTORY:  Social History     Socioeconomic History    Marital status:     Number of children: 2   Occupational History    Occupation: [employer]    Tobacco Use    Smoking status: Former Smoker     Packs/day: 0.25     Years: 5.00     Pack years: 1.25     Quit date: 10/2/1962     Years since quittin.6    Smokeless tobacco: Never Used    Tobacco comment: quit age 23   Substance and Sexual Activity    Alcohol use: Yes     Comment: beer occasionally    Drug use: No    Sexual activity: Yes     Partners: Female       FAMILY HISTORY:  Family History   Problem Relation Age of Onset    Colon cancer Mother     Diabetes Mother     Heart disease Father     Mental illness Sister     Diabetes Sister     Other Brother         polio as a child    Pulmonary fibrosis Brother     Diabetes Unknown     Diabetes Brother     Myasthenia gravis Brother     Parkinsonism Brother     Diabetes Brother     Dementia Brother     Lung cancer Brother         smoker    Diabetes Maternal Aunt        ALLERGIES AND MEDICATIONS: updated and reviewed.  Review of patient's allergies indicates:  No Known Allergies  Current  Outpatient Medications   Medication Sig Dispense Refill    albuterol (PROVENTIL/VENTOLIN HFA) 90 mcg/actuation inhaler INHALE 2 PUFFS BY MOUTH EVERY 6 HOURS AS NEEDED FOR WHEEZING 9 g 0    ascorbic acid, vitamin C, (VITAMIN C) 1000 MG tablet Take 1,000 mg by mouth once daily.      b complex vitamins capsule Take 1 capsule by mouth once daily.      ethambutoL (MYAMBUTOL) 400 MG Tab Take 2 tablets (800 mg total) by mouth once daily. 60 tablet 5    mucus clearing device (AEROBIKA OSCILLATING PEP SYSTM) by Misc.(Non-Drug; Combo Route) route 2 (two) times a day. 1 Device 0    nebulizer and compressor ([employer] COMPRESSOR SYSTEM) Marcy use to administer nebulized medication as directed 1 each 0    omega 3-dha-epa-fish oil 1,000 (120-180) mg Cap Take 1 capsule by mouth once daily.      omega-3 fatty acids 1,000 mg Cap Take 2 g by mouth.      omeprazole 20 mg TbEC 1/2 tablet daily in am as needed. 45 each 0    pravastatin (PRAVACHOL) 10 MG tablet Take 1 tablet (10 mg total) by mouth once daily. 90 tablet 0    promethazine-dextromethorphan (PROMETHAZINE-DM) 6.25-15 mg/5 mL Syrp Take 10-15ml by mouth every 8 hours as needed for coughing 240 mL 0    sodium chloride 3% 3 % nebulizer solution USE 4 ML VIAL IN NEBULIZER  TWICE DAILY 480 mL 11    warfarin (COUMADIN) 3 MG tablet Take 1.5 tablets (4.5 mg total) by mouth Daily. As directed by coumadin clinic 45 tablet 11    aspirin (ECOTRIN) 81 MG EC tablet Take 81 mg by mouth.       No current facility-administered medications for this visit.       Review of Systems   Constitutional: Negative for activity change, fatigue and unexpected weight change.   HENT: Negative for trouble swallowing and voice change.    Eyes: Negative for photophobia, pain and visual disturbance.   Respiratory: Negative for apnea and shortness of breath.    Cardiovascular: Negative for chest pain and palpitations.   Gastrointestinal: Negative for constipation, nausea and vomiting.   Genitourinary:  Negative for difficulty urinating.   Musculoskeletal: Negative for arthralgias, back pain, gait problem, myalgias and neck pain.   Skin: Negative for color change and rash.   Neurological: Positive for speech difficulty. Negative for dizziness, seizures, syncope, weakness, light-headedness, numbness and headaches.   Psychiatric/Behavioral: Negative for agitation, behavioral problems and confusion.       Neurologic Exam     Mental Status   Oriented to person, place, and time.   Registration: recalls 3 of 3 objects.   Attention: normal. Concentration: normal.   Speech: speech is normal   Level of consciousness: alert  Knowledge: good.     Cranial Nerves     CN II   Right visual field deficit: none  Left visual field deficit: none     CN III, IV, VI   Pupils are equal, round, and reactive to light.  Extraocular motions are normal.   Right pupil: Size: 3 mm. Shape: regular.   Left pupil: Size: 3 mm. Shape: regular.   CN III: no CN III palsy  CN VI: no CN VI palsy  Nystagmus: none   Diplopia: none  Ophthalmoparesis: none  Upgaze: normal  Downgaze: normal  Conjugate gaze: present    CN VII   Facial expression full, symmetric.   Right facial weakness: none  Left facial weakness: none    CN VIII   CN VIII normal.     CN XI   CN XI normal.     CN XII   CN XII normal.   Tongue deviation: none    Motor Exam   Muscle bulk: normal  Overall muscle tone: normal  Right arm tone: normal  Left arm tone: normal  Right leg tone: normal  Left leg tone: normal    Gait, Coordination, and Reflexes     Gait  Gait: normal    Coordination   Finger to nose coordination: normal    Tremor   Resting tremor: absent    Reflexes   Right brachioradialis: 2+  Left brachioradialis: 2+  Right biceps: 2+  Left biceps: 2+  Right triceps: 2+  Left triceps: 2+  Right patellar: 3+  Left patellar: 3+      Physical Exam  Constitutional:       Appearance: He is well-developed.   HENT:      Head: Normocephalic and atraumatic.   Eyes:      Extraocular Movements:  "EOM normal.      Pupils: Pupils are equal, round, and reactive to light.   Pulmonary:      Effort: Pulmonary effort is normal. No respiratory distress.   Musculoskeletal:         General: Normal range of motion.   Neurological:      Mental Status: He is alert and oriented to person, place, and time.      Coordination: Finger-Nose-Finger Test normal.      Gait: Gait is intact.      Deep Tendon Reflexes:      Reflex Scores:       Tricep reflexes are 2+ on the right side and 2+ on the left side.       Bicep reflexes are 2+ on the right side and 2+ on the left side.       Brachioradialis reflexes are 2+ on the right side and 2+ on the left side.       Patellar reflexes are 3+ on the right side and 3+ on the left side.  Psychiatric:         Speech: Speech normal.         Behavior: Behavior normal.         Vitals:    03/18/22 1319   BP: 118/68   BP Location: Right arm   Patient Position: Sitting   BP Method: Large (Automatic)   Pulse: (!) 55   Weight: 72.2 kg (159 lb 2.8 oz)   Height: 5' 9" (1.753 m)       Assessment & Plan:    Problem List Items Addressed This Visit    None     Visit Diagnoses     Other amnesia    -  Primary    Relevant Orders    Vitamin B12 (Completed)    Methylmalonic Acid, Serum (Completed)    TSH (Completed)    MRI Brain Without Contrast (Completed)        Follow-up: No follow-ups on file.    This note was done with the assistance of voice recognition software. Some errors may be present after proofreading.              "

## 2022-04-25 ENCOUNTER — ANTI-COAG VISIT (OUTPATIENT)
Dept: CARDIOLOGY | Facility: CLINIC | Age: 82
End: 2022-04-25
Payer: MEDICARE

## 2022-04-25 DIAGNOSIS — D68.61 ANTIPHOSPHOLIPID SYNDROME: ICD-10-CM

## 2022-04-25 DIAGNOSIS — Z86.711 HISTORY OF PULMONARY EMBOLISM: Primary | ICD-10-CM

## 2022-04-25 PROCEDURE — 93793 ANTICOAG MGMT PT WARFARIN: CPT | Mod: S$GLB,,,

## 2022-04-25 PROCEDURE — 93793 PR ANTICOAGULANT MGMT FOR PT TAKING WARFARIN: ICD-10-PCS | Mod: S$GLB,,,

## 2022-04-28 ENCOUNTER — ANESTHESIA EVENT (OUTPATIENT)
Dept: SURGERY | Facility: HOSPITAL | Age: 82
DRG: 028 | End: 2022-04-28
Payer: MEDICARE

## 2022-04-28 ENCOUNTER — ANESTHESIA (OUTPATIENT)
Dept: SURGERY | Facility: HOSPITAL | Age: 82
DRG: 028 | End: 2022-04-28
Payer: MEDICARE

## 2022-04-28 ENCOUNTER — HOSPITAL ENCOUNTER (INPATIENT)
Facility: HOSPITAL | Age: 82
LOS: 15 days | Discharge: HOME | DRG: 028 | End: 2022-05-13
Attending: EMERGENCY MEDICINE | Admitting: STUDENT IN AN ORGANIZED HEALTH CARE EDUCATION/TRAINING PROGRAM
Payer: MEDICARE

## 2022-04-28 DIAGNOSIS — I95.9 HYPOTENSION, UNSPECIFIED HYPOTENSION TYPE: ICD-10-CM

## 2022-04-28 DIAGNOSIS — G06.2 EPIDURAL ABSCESS: ICD-10-CM

## 2022-04-28 DIAGNOSIS — R94.31 QT PROLONGATION: ICD-10-CM

## 2022-04-28 DIAGNOSIS — R29.90 STROKE-LIKE SYMPTOMS: Primary | ICD-10-CM

## 2022-04-28 DIAGNOSIS — S06.4XAA EPIDURAL HEMATOMA: ICD-10-CM

## 2022-04-28 DIAGNOSIS — M54.9 BACK PAIN: ICD-10-CM

## 2022-04-28 DIAGNOSIS — G93.40 ACUTE ENCEPHALOPATHY: ICD-10-CM

## 2022-04-28 DIAGNOSIS — Z74.09 IMPAIRED MOBILITY: ICD-10-CM

## 2022-04-28 DIAGNOSIS — I95.9 HYPOTENSION (ARTERIAL): ICD-10-CM

## 2022-04-28 DIAGNOSIS — R33.9 URINARY RETENTION: ICD-10-CM

## 2022-04-28 DIAGNOSIS — R06.02 SOB (SHORTNESS OF BREATH): ICD-10-CM

## 2022-04-28 DIAGNOSIS — I63.512 ACUTE ISCHEMIC LEFT MCA STROKE: ICD-10-CM

## 2022-04-28 DIAGNOSIS — R93.7 ABNORMAL CT SCAN, CERVICAL SPINE: ICD-10-CM

## 2022-04-28 DIAGNOSIS — M79.603 ARM PAIN: ICD-10-CM

## 2022-04-28 DIAGNOSIS — I63.9 STROKE: ICD-10-CM

## 2022-04-28 PROBLEM — M54.6 ACUTE THORACIC BACK PAIN: Status: ACTIVE | Noted: 2022-04-28

## 2022-04-28 PROBLEM — D68.61 ANTIPHOSPHOLIPID SYNDROME: Status: ACTIVE | Noted: 2021-11-19

## 2022-04-28 LAB
ABO + RH BLD: NORMAL
ALBUMIN SERPL BCP-MCNC: 3.8 G/DL (ref 3.5–5.2)
ALP SERPL-CCNC: 73 U/L (ref 55–135)
ALT SERPL W/O P-5'-P-CCNC: 28 U/L (ref 10–44)
ANION GAP SERPL CALC-SCNC: 10 MMOL/L (ref 8–16)
ANION GAP SERPL CALC-SCNC: 17 MMOL/L (ref 8–16)
AST SERPL-CCNC: 28 U/L (ref 10–40)
BASOPHILS # BLD AUTO: 0.02 K/UL (ref 0–0.2)
BASOPHILS # BLD AUTO: 0.06 K/UL (ref 0–0.2)
BASOPHILS # BLD AUTO: 0.06 K/UL (ref 0–0.2)
BASOPHILS NFR BLD: 0.3 % (ref 0–1.9)
BASOPHILS NFR BLD: 0.8 % (ref 0–1.9)
BASOPHILS NFR BLD: 0.8 % (ref 0–1.9)
BILIRUB SERPL-MCNC: 0.4 MG/DL (ref 0.1–1)
BLD GP AB SCN CELLS X3 SERPL QL: NORMAL
BUN SERPL-MCNC: 10 MG/DL (ref 6–30)
BUN SERPL-MCNC: 9 MG/DL (ref 8–23)
CALCIUM SERPL-MCNC: 9 MG/DL (ref 8.7–10.5)
CHLORIDE SERPL-SCNC: 101 MMOL/L (ref 95–110)
CHLORIDE SERPL-SCNC: 105 MMOL/L (ref 95–110)
CHOLEST SERPL-MCNC: 116 MG/DL (ref 120–199)
CHOLEST/HDLC SERPL: 3.2 {RATIO} (ref 2–5)
CO2 SERPL-SCNC: 24 MMOL/L (ref 23–29)
CREAT SERPL-MCNC: 0.8 MG/DL (ref 0.5–1.4)
CREAT SERPL-MCNC: 0.9 MG/DL (ref 0.5–1.4)
DIFFERENTIAL METHOD: ABNORMAL
EOSINOPHIL # BLD AUTO: 0 K/UL (ref 0–0.5)
EOSINOPHIL # BLD AUTO: 0.2 K/UL (ref 0–0.5)
EOSINOPHIL # BLD AUTO: 0.2 K/UL (ref 0–0.5)
EOSINOPHIL NFR BLD: 0 % (ref 0–8)
EOSINOPHIL NFR BLD: 2.7 % (ref 0–8)
EOSINOPHIL NFR BLD: 2.7 % (ref 0–8)
ERYTHROCYTE [DISTWIDTH] IN BLOOD BY AUTOMATED COUNT: 14.8 % (ref 11.5–14.5)
ERYTHROCYTE [DISTWIDTH] IN BLOOD BY AUTOMATED COUNT: 14.8 % (ref 11.5–14.5)
ERYTHROCYTE [DISTWIDTH] IN BLOOD BY AUTOMATED COUNT: 15.1 % (ref 11.5–14.5)
EST. GFR  (AFRICAN AMERICAN): >60 ML/MIN/1.73 M^2
EST. GFR  (NON AFRICAN AMERICAN): >60 ML/MIN/1.73 M^2
ESTIMATED AVG GLUCOSE: 117 MG/DL (ref 68–131)
GLUCOSE SERPL-MCNC: 133 MG/DL (ref 70–110)
GLUCOSE SERPL-MCNC: 136 MG/DL (ref 70–110)
HBA1C MFR BLD: 5.7 % (ref 4–5.6)
HCT VFR BLD AUTO: 36.3 % (ref 40–54)
HCT VFR BLD AUTO: 41.5 % (ref 40–54)
HCT VFR BLD AUTO: 41.5 % (ref 40–54)
HCT VFR BLD CALC: 42 %PCV (ref 36–54)
HDLC SERPL-MCNC: 36 MG/DL (ref 40–75)
HDLC SERPL: 31 % (ref 20–50)
HGB BLD-MCNC: 11.9 G/DL (ref 14–18)
HGB BLD-MCNC: 13.5 G/DL (ref 14–18)
HGB BLD-MCNC: 13.5 G/DL (ref 14–18)
IMM GRANULOCYTES # BLD AUTO: 0.03 K/UL (ref 0–0.04)
IMM GRANULOCYTES # BLD AUTO: 0.03 K/UL (ref 0–0.04)
IMM GRANULOCYTES # BLD AUTO: 0.06 K/UL (ref 0–0.04)
IMM GRANULOCYTES NFR BLD AUTO: 0.4 % (ref 0–0.5)
IMM GRANULOCYTES NFR BLD AUTO: 0.4 % (ref 0–0.5)
IMM GRANULOCYTES NFR BLD AUTO: 0.8 % (ref 0–0.5)
INR PPP: 3.1 (ref 0.8–1.2)
LACTATE SERPL-SCNC: 2.2 MMOL/L (ref 0.5–2.2)
LDLC SERPL CALC-MCNC: 62.4 MG/DL (ref 63–159)
LYMPHOCYTES # BLD AUTO: 0.9 K/UL (ref 1–4.8)
LYMPHOCYTES # BLD AUTO: 1.8 K/UL (ref 1–4.8)
LYMPHOCYTES # BLD AUTO: 1.8 K/UL (ref 1–4.8)
LYMPHOCYTES NFR BLD: 11.3 % (ref 18–48)
LYMPHOCYTES NFR BLD: 22.7 % (ref 18–48)
LYMPHOCYTES NFR BLD: 22.7 % (ref 18–48)
MCH RBC QN AUTO: 27.5 PG (ref 27–31)
MCH RBC QN AUTO: 27.9 PG (ref 27–31)
MCH RBC QN AUTO: 27.9 PG (ref 27–31)
MCHC RBC AUTO-ENTMCNC: 32.5 G/DL (ref 32–36)
MCHC RBC AUTO-ENTMCNC: 32.5 G/DL (ref 32–36)
MCHC RBC AUTO-ENTMCNC: 32.8 G/DL (ref 32–36)
MCV RBC AUTO: 84 FL (ref 82–98)
MCV RBC AUTO: 86 FL (ref 82–98)
MCV RBC AUTO: 86 FL (ref 82–98)
MONOCYTES # BLD AUTO: 0.2 K/UL (ref 0.3–1)
MONOCYTES # BLD AUTO: 0.9 K/UL (ref 0.3–1)
MONOCYTES # BLD AUTO: 0.9 K/UL (ref 0.3–1)
MONOCYTES NFR BLD: 11.4 % (ref 4–15)
MONOCYTES NFR BLD: 11.4 % (ref 4–15)
MONOCYTES NFR BLD: 3 % (ref 4–15)
NEUTROPHILS # BLD AUTO: 4.9 K/UL (ref 1.8–7.7)
NEUTROPHILS # BLD AUTO: 4.9 K/UL (ref 1.8–7.7)
NEUTROPHILS # BLD AUTO: 6.7 K/UL (ref 1.8–7.7)
NEUTROPHILS NFR BLD: 62 % (ref 38–73)
NEUTROPHILS NFR BLD: 62 % (ref 38–73)
NEUTROPHILS NFR BLD: 84.6 % (ref 38–73)
NONHDLC SERPL-MCNC: 80 MG/DL
NRBC BLD-RTO: 0 /100 WBC
PLATELET # BLD AUTO: 230 K/UL (ref 150–450)
PLATELET # BLD AUTO: 244 K/UL (ref 150–450)
PLATELET # BLD AUTO: 244 K/UL (ref 150–450)
PMV BLD AUTO: 10 FL (ref 9.2–12.9)
PMV BLD AUTO: 10 FL (ref 9.2–12.9)
PMV BLD AUTO: 9.9 FL (ref 9.2–12.9)
POC IONIZED CALCIUM: 1.24 MMOL/L (ref 1.06–1.42)
POC PTINR: 2.6 (ref 0.9–1.2)
POC PTWBT: 29.3 SEC (ref 9.7–14.3)
POC TCO2 (MEASURED): 28 MMOL/L (ref 23–29)
POCT GLUCOSE: 158 MG/DL (ref 70–110)
POCT GLUCOSE: 167 MG/DL (ref 70–110)
POTASSIUM BLD-SCNC: 3.9 MMOL/L (ref 3.5–5.1)
POTASSIUM SERPL-SCNC: 3.8 MMOL/L (ref 3.5–5.1)
PROCALCITONIN SERPL IA-MCNC: 0.02 NG/ML
PROT SERPL-MCNC: 7.3 G/DL (ref 6–8.4)
PROTHROMBIN TIME: 31.8 SEC (ref 9–12.5)
RBC # BLD AUTO: 4.33 M/UL (ref 4.6–6.2)
RBC # BLD AUTO: 4.84 M/UL (ref 4.6–6.2)
RBC # BLD AUTO: 4.84 M/UL (ref 4.6–6.2)
SAMPLE: ABNORMAL
SAMPLE: ABNORMAL
SARS-COV-2 RDRP RESP QL NAA+PROBE: NEGATIVE
SODIUM BLD-SCNC: 141 MMOL/L (ref 136–145)
SODIUM SERPL-SCNC: 139 MMOL/L (ref 136–145)
TRIGL SERPL-MCNC: 88 MG/DL (ref 30–150)
TSH SERPL DL<=0.005 MIU/L-ACNC: 3.65 UIU/ML (ref 0.4–4)
WBC # BLD AUTO: 7.81 K/UL (ref 3.9–12.7)
WBC # BLD AUTO: 7.81 K/UL (ref 3.9–12.7)
WBC # BLD AUTO: 7.89 K/UL (ref 3.9–12.7)

## 2022-04-28 PROCEDURE — 22614 PR ARTHRODESIS, POST/POSTLAT, SNGL INTERSPACE, EA ADDTL: ICD-10-PCS | Mod: ,,, | Performed by: NEUROLOGICAL SURGERY

## 2022-04-28 PROCEDURE — 93005 ELECTROCARDIOGRAM TRACING: CPT

## 2022-04-28 PROCEDURE — 88304 TISSUE EXAM BY PATHOLOGIST: CPT | Performed by: PATHOLOGY

## 2022-04-28 PROCEDURE — 80053 COMPREHEN METABOLIC PANEL: CPT | Performed by: EMERGENCY MEDICINE

## 2022-04-28 PROCEDURE — 96361 HYDRATE IV INFUSION ADD-ON: CPT

## 2022-04-28 PROCEDURE — 63600175 PHARM REV CODE 636 W HCPCS: Performed by: EMERGENCY MEDICINE

## 2022-04-28 PROCEDURE — 25000003 PHARM REV CODE 250: Performed by: NURSE ANESTHETIST, CERTIFIED REGISTERED

## 2022-04-28 PROCEDURE — 97535 SELF CARE MNGMENT TRAINING: CPT

## 2022-04-28 PROCEDURE — 85014 HEMATOCRIT: CPT

## 2022-04-28 PROCEDURE — A4216 STERILE WATER/SALINE, 10 ML: HCPCS | Performed by: PHYSICIAN ASSISTANT

## 2022-04-28 PROCEDURE — C1713 ANCHOR/SCREW BN/BN,TIS/BN: HCPCS | Performed by: NEUROLOGICAL SURGERY

## 2022-04-28 PROCEDURE — G0426 PR INPT TELEHEALTH CONSULT 50M: ICD-10-PCS | Mod: 95,,, | Performed by: STUDENT IN AN ORGANIZED HEALTH CARE EDUCATION/TRAINING PROGRAM

## 2022-04-28 PROCEDURE — 99291 CRITICAL CARE FIRST HOUR: CPT | Mod: ,,, | Performed by: PHYSICIAN ASSISTANT

## 2022-04-28 PROCEDURE — 36000710: Performed by: NEUROLOGICAL SURGERY

## 2022-04-28 PROCEDURE — 63265 EXCISE INTRASPINL LESION CRV: CPT | Mod: ,,, | Performed by: NEUROLOGICAL SURGERY

## 2022-04-28 PROCEDURE — 96366 THER/PROPH/DIAG IV INF ADDON: CPT

## 2022-04-28 PROCEDURE — 99900035 HC TECH TIME PER 15 MIN (STAT)

## 2022-04-28 PROCEDURE — 63600175 PHARM REV CODE 636 W HCPCS: Performed by: PHYSICIAN ASSISTANT

## 2022-04-28 PROCEDURE — 25000003 PHARM REV CODE 250: Performed by: HOSPITALIST

## 2022-04-28 PROCEDURE — 82330 ASSAY OF CALCIUM: CPT

## 2022-04-28 PROCEDURE — 20930 SP BONE ALGRFT MORSEL ADD-ON: CPT | Mod: ,,, | Performed by: NEUROLOGICAL SURGERY

## 2022-04-28 PROCEDURE — 63600175 PHARM REV CODE 636 W HCPCS: Performed by: NURSE ANESTHETIST, CERTIFIED REGISTERED

## 2022-04-28 PROCEDURE — 20936 PR AUTOGRAFT SPINE SURGERY LOCAL FROM SAME INCISION: ICD-10-PCS | Mod: ,,, | Performed by: NEUROLOGICAL SURGERY

## 2022-04-28 PROCEDURE — 27800903 OPTIME MED/SURG SUP & DEVICES OTHER IMPLANTS: Performed by: NEUROLOGICAL SURGERY

## 2022-04-28 PROCEDURE — 82565 ASSAY OF CREATININE: CPT

## 2022-04-28 PROCEDURE — 99285 EMERGENCY DEPT VISIT HI MDM: CPT | Mod: 25

## 2022-04-28 PROCEDURE — 22614 ARTHRD PST TQ 1NTRSPC EA ADD: CPT | Mod: ,,, | Performed by: NEUROLOGICAL SURGERY

## 2022-04-28 PROCEDURE — 83605 ASSAY OF LACTIC ACID: CPT

## 2022-04-28 PROCEDURE — 84145 PROCALCITONIN (PCT): CPT | Performed by: HOSPITALIST

## 2022-04-28 PROCEDURE — 86850 RBC ANTIBODY SCREEN: CPT | Performed by: PHYSICIAN ASSISTANT

## 2022-04-28 PROCEDURE — 83036 HEMOGLOBIN GLYCOSYLATED A1C: CPT | Performed by: NURSE PRACTITIONER

## 2022-04-28 PROCEDURE — 20000000 HC ICU ROOM

## 2022-04-28 PROCEDURE — 25000003 PHARM REV CODE 250: Performed by: NEUROLOGICAL SURGERY

## 2022-04-28 PROCEDURE — 20930 PR ALLOGRAFT FOR SPINE SURGERY ONLY MORSELIZED: ICD-10-PCS | Mod: ,,, | Performed by: NEUROLOGICAL SURGERY

## 2022-04-28 PROCEDURE — 96375 TX/PRO/DX INJ NEW DRUG ADDON: CPT

## 2022-04-28 PROCEDURE — 83605 ASSAY OF LACTIC ACID: CPT | Performed by: HOSPITALIST

## 2022-04-28 PROCEDURE — 93010 ELECTROCARDIOGRAM REPORT: CPT | Mod: ,,, | Performed by: INTERNAL MEDICINE

## 2022-04-28 PROCEDURE — A9585 GADOBUTROL INJECTION: HCPCS | Performed by: INTERNAL MEDICINE

## 2022-04-28 PROCEDURE — 25000003 PHARM REV CODE 250: Performed by: PHYSICIAN ASSISTANT

## 2022-04-28 PROCEDURE — 63600175 PHARM REV CODE 636 W HCPCS: Performed by: STUDENT IN AN ORGANIZED HEALTH CARE EDUCATION/TRAINING PROGRAM

## 2022-04-28 PROCEDURE — D9220A PRA ANESTHESIA: Mod: CRNA,,, | Performed by: NURSE ANESTHETIST, CERTIFIED REGISTERED

## 2022-04-28 PROCEDURE — 37000009 HC ANESTHESIA EA ADD 15 MINS: Performed by: NEUROLOGICAL SURGERY

## 2022-04-28 PROCEDURE — 93010 EKG 12-LEAD: ICD-10-PCS | Mod: ,,, | Performed by: INTERNAL MEDICINE

## 2022-04-28 PROCEDURE — 84132 ASSAY OF SERUM POTASSIUM: CPT

## 2022-04-28 PROCEDURE — 80061 LIPID PANEL: CPT | Performed by: EMERGENCY MEDICINE

## 2022-04-28 PROCEDURE — 88304 TISSUE EXAM BY PATHOLOGIST: CPT | Mod: 26,,, | Performed by: PATHOLOGY

## 2022-04-28 PROCEDURE — 85610 PROTHROMBIN TIME: CPT

## 2022-04-28 PROCEDURE — 86920 COMPATIBILITY TEST SPIN: CPT | Performed by: PHYSICIAN ASSISTANT

## 2022-04-28 PROCEDURE — 25000242 PHARM REV CODE 250 ALT 637 W/ HCPCS: Performed by: NURSE PRACTITIONER

## 2022-04-28 PROCEDURE — 22600 ARTHRD PST TQ 1NTRSPC CRV: CPT | Mod: 51,,, | Performed by: NEUROLOGICAL SURGERY

## 2022-04-28 PROCEDURE — 63600175 PHARM REV CODE 636 W HCPCS: Performed by: NEUROLOGICAL SURGERY

## 2022-04-28 PROCEDURE — 99291 PR CRITICAL CARE, E/M 30-74 MINUTES: ICD-10-PCS | Mod: ,,, | Performed by: PHYSICIAN ASSISTANT

## 2022-04-28 PROCEDURE — 25000003 PHARM REV CODE 250: Performed by: NURSE PRACTITIONER

## 2022-04-28 PROCEDURE — 27201423 OPTIME MED/SURG SUP & DEVICES STERILE SUPPLY: Performed by: NEUROLOGICAL SURGERY

## 2022-04-28 PROCEDURE — 84295 ASSAY OF SERUM SODIUM: CPT

## 2022-04-28 PROCEDURE — 25000003 PHARM REV CODE 250: Performed by: EMERGENCY MEDICINE

## 2022-04-28 PROCEDURE — 88304 PR  SURG PATH,LEVEL III: ICD-10-PCS | Mod: 26,,, | Performed by: PATHOLOGY

## 2022-04-28 PROCEDURE — 25500020 PHARM REV CODE 255: Performed by: INTERNAL MEDICINE

## 2022-04-28 PROCEDURE — 25000003 PHARM REV CODE 250: Performed by: STUDENT IN AN ORGANIZED HEALTH CARE EDUCATION/TRAINING PROGRAM

## 2022-04-28 PROCEDURE — 87040 BLOOD CULTURE FOR BACTERIA: CPT | Performed by: EMERGENCY MEDICINE

## 2022-04-28 PROCEDURE — 22842 PR POSTERIOR SEGMENTAL INSTRUMENTATION 3-6 VRT SEG: ICD-10-PCS | Mod: ,,, | Performed by: NEUROLOGICAL SURGERY

## 2022-04-28 PROCEDURE — 63265 PR EXCIS INTRASP LESN,XDURAL,CERVICAL: ICD-10-PCS | Mod: ,,, | Performed by: NEUROLOGICAL SURGERY

## 2022-04-28 PROCEDURE — 20936 SP BONE AGRFT LOCAL ADD-ON: CPT | Mod: ,,, | Performed by: NEUROLOGICAL SURGERY

## 2022-04-28 PROCEDURE — 25500020 PHARM REV CODE 255: Performed by: EMERGENCY MEDICINE

## 2022-04-28 PROCEDURE — 92610 EVALUATE SWALLOWING FUNCTION: CPT

## 2022-04-28 PROCEDURE — 80048 BASIC METABOLIC PNL TOTAL CA: CPT | Mod: XB | Performed by: STUDENT IN AN ORGANIZED HEALTH CARE EDUCATION/TRAINING PROGRAM

## 2022-04-28 PROCEDURE — 85025 COMPLETE CBC W/AUTO DIFF WBC: CPT | Mod: 91 | Performed by: STUDENT IN AN ORGANIZED HEALTH CARE EDUCATION/TRAINING PROGRAM

## 2022-04-28 PROCEDURE — 22842 INSERT SPINE FIXATION DEVICE: CPT | Mod: ,,, | Performed by: NEUROLOGICAL SURGERY

## 2022-04-28 PROCEDURE — 99223 1ST HOSP IP/OBS HIGH 75: CPT | Mod: 57,AI,, | Performed by: PHYSICIAN ASSISTANT

## 2022-04-28 PROCEDURE — 99223 PR INITIAL HOSPITAL CARE,LEVL III: ICD-10-PCS | Mod: 57,AI,, | Performed by: PHYSICIAN ASSISTANT

## 2022-04-28 PROCEDURE — D9220A PRA ANESTHESIA: Mod: ANES,,, | Performed by: ANESTHESIOLOGY

## 2022-04-28 PROCEDURE — G0426 INPT/ED TELECONSULT50: HCPCS | Mod: 95,,, | Performed by: STUDENT IN AN ORGANIZED HEALTH CARE EDUCATION/TRAINING PROGRAM

## 2022-04-28 PROCEDURE — D9220A PRA ANESTHESIA: ICD-10-PCS | Mod: CRNA,,, | Performed by: NURSE ANESTHETIST, CERTIFIED REGISTERED

## 2022-04-28 PROCEDURE — U0002 COVID-19 LAB TEST NON-CDC: HCPCS | Performed by: EMERGENCY MEDICINE

## 2022-04-28 PROCEDURE — S8185 FLUTTER DEVICE: HCPCS | Performed by: NURSE PRACTITIONER

## 2022-04-28 PROCEDURE — 27201037 HC PRESSURE MONITORING SET UP

## 2022-04-28 PROCEDURE — 94640 AIRWAY INHALATION TREATMENT: CPT

## 2022-04-28 PROCEDURE — 84443 ASSAY THYROID STIM HORMONE: CPT | Performed by: EMERGENCY MEDICINE

## 2022-04-28 PROCEDURE — 63700000 PHARM REV CODE 250 ALT 637 W/O HCPCS: Performed by: NURSE PRACTITIONER

## 2022-04-28 PROCEDURE — 37000008 HC ANESTHESIA 1ST 15 MINUTES: Performed by: NEUROLOGICAL SURGERY

## 2022-04-28 PROCEDURE — 85025 COMPLETE CBC W/AUTO DIFF WBC: CPT | Performed by: EMERGENCY MEDICINE

## 2022-04-28 PROCEDURE — 85610 PROTHROMBIN TIME: CPT | Performed by: EMERGENCY MEDICINE

## 2022-04-28 PROCEDURE — 96365 THER/PROPH/DIAG IV INF INIT: CPT | Mod: 59

## 2022-04-28 PROCEDURE — D9220A PRA ANESTHESIA: ICD-10-PCS | Mod: ANES,,, | Performed by: ANESTHESIOLOGY

## 2022-04-28 PROCEDURE — 36000711: Performed by: NEUROLOGICAL SURGERY

## 2022-04-28 PROCEDURE — 22600 PR ARTHRODESIS, POST/POSTLAT, SNGL INTERSPACE, CERVICAL BELOW C2: ICD-10-PCS | Mod: 51,,, | Performed by: NEUROLOGICAL SURGERY

## 2022-04-28 DEVICE — GRAFT ALTAPORE MED CYL 2.6ML: Type: IMPLANTABLE DEVICE | Site: SPINE CERVICAL | Status: FUNCTIONAL

## 2022-04-28 DEVICE — SCREW PROFICIENT LOCKING: Type: IMPLANTABLE DEVICE | Site: SPINE CERVICAL | Status: FUNCTIONAL

## 2022-04-28 DEVICE — SCREW POLYAXIAL CERV 3.8X14MM: Type: IMPLANTABLE DEVICE | Site: SPINE CERVICAL | Status: FUNCTIONAL

## 2022-04-28 DEVICE — IMPLANTABLE DEVICE: Type: IMPLANTABLE DEVICE | Site: SPINE CERVICAL | Status: FUNCTIONAL

## 2022-04-28 RX ORDER — HALOPERIDOL 5 MG/ML
0.5 INJECTION INTRAMUSCULAR EVERY 10 MIN PRN
Status: CANCELLED | OUTPATIENT
Start: 2022-04-28

## 2022-04-28 RX ORDER — PROPOFOL 10 MG/ML
VIAL (ML) INTRAVENOUS
Status: DISCONTINUED | OUTPATIENT
Start: 2022-04-28 | End: 2022-04-28

## 2022-04-28 RX ORDER — SODIUM CHLORIDE 0.9 % (FLUSH) 0.9 %
10 SYRINGE (ML) INJECTION
Status: DISCONTINUED | OUTPATIENT
Start: 2022-04-28 | End: 2022-05-03

## 2022-04-28 RX ORDER — FENTANYL CITRATE 50 UG/ML
INJECTION, SOLUTION INTRAMUSCULAR; INTRAVENOUS
Status: DISCONTINUED | OUTPATIENT
Start: 2022-04-28 | End: 2022-04-28

## 2022-04-28 RX ORDER — PRAVASTATIN SODIUM 10 MG/1
10 TABLET ORAL DAILY
Status: DISCONTINUED | OUTPATIENT
Start: 2022-04-28 | End: 2022-05-09

## 2022-04-28 RX ORDER — ASPIRIN 81 MG/1
81 TABLET ORAL DAILY
Status: DISCONTINUED | OUTPATIENT
Start: 2022-04-28 | End: 2022-04-28

## 2022-04-28 RX ORDER — LIDOCAINE HYDROCHLORIDE 20 MG/ML
INJECTION INTRAVENOUS
Status: DISCONTINUED | OUTPATIENT
Start: 2022-04-28 | End: 2022-04-28

## 2022-04-28 RX ORDER — ONDANSETRON 2 MG/ML
4 INJECTION INTRAMUSCULAR; INTRAVENOUS EVERY 8 HOURS PRN
Status: DISCONTINUED | OUTPATIENT
Start: 2022-04-28 | End: 2022-05-13 | Stop reason: HOSPADM

## 2022-04-28 RX ORDER — BACITRACIN ZINC 500 UNIT/G
OINTMENT (GRAM) TOPICAL
Status: DISCONTINUED | OUTPATIENT
Start: 2022-04-28 | End: 2022-04-28 | Stop reason: HOSPADM

## 2022-04-28 RX ORDER — METOCLOPRAMIDE HYDROCHLORIDE 5 MG/ML
5 INJECTION INTRAMUSCULAR; INTRAVENOUS EVERY 6 HOURS PRN
Status: DISCONTINUED | OUTPATIENT
Start: 2022-04-28 | End: 2022-05-04

## 2022-04-28 RX ORDER — FAMOTIDINE 10 MG/ML
20 INJECTION INTRAVENOUS EVERY 12 HOURS
Status: DISCONTINUED | OUTPATIENT
Start: 2022-04-28 | End: 2022-04-28

## 2022-04-28 RX ORDER — AZITHROMYCIN 250 MG/1
500 TABLET, FILM COATED ORAL DAILY
Status: DISCONTINUED | OUTPATIENT
Start: 2022-04-28 | End: 2022-05-13 | Stop reason: HOSPADM

## 2022-04-28 RX ORDER — HYDROMORPHONE HYDROCHLORIDE 1 MG/ML
0.2 INJECTION, SOLUTION INTRAMUSCULAR; INTRAVENOUS; SUBCUTANEOUS EVERY 5 MIN PRN
Status: CANCELLED | OUTPATIENT
Start: 2022-04-28

## 2022-04-28 RX ORDER — CEFAZOLIN SODIUM 1 G/3ML
INJECTION, POWDER, FOR SOLUTION INTRAMUSCULAR; INTRAVENOUS
Status: DISCONTINUED | OUTPATIENT
Start: 2022-04-28 | End: 2022-04-28

## 2022-04-28 RX ORDER — SODIUM CHLORIDE FOR INHALATION 3 %
4 VIAL, NEBULIZER (ML) INHALATION 2 TIMES DAILY
Status: DISCONTINUED | OUTPATIENT
Start: 2022-04-28 | End: 2022-05-13 | Stop reason: HOSPADM

## 2022-04-28 RX ORDER — SODIUM CHLORIDE 0.9 % (FLUSH) 0.9 %
3 SYRINGE (ML) INJECTION
Status: CANCELLED | OUTPATIENT
Start: 2022-04-28

## 2022-04-28 RX ORDER — HYDROMORPHONE HYDROCHLORIDE 1 MG/ML
1 INJECTION, SOLUTION INTRAMUSCULAR; INTRAVENOUS; SUBCUTANEOUS EVERY 4 HOURS PRN
Status: DISCONTINUED | OUTPATIENT
Start: 2022-04-28 | End: 2022-04-28

## 2022-04-28 RX ORDER — ETHAMBUTOL HYDROCHLORIDE 400 MG/1
800 TABLET, FILM COATED ORAL DAILY
Status: DISCONTINUED | OUTPATIENT
Start: 2022-04-28 | End: 2022-05-13 | Stop reason: HOSPADM

## 2022-04-28 RX ORDER — ONDANSETRON 4 MG/1
4 TABLET, ORALLY DISINTEGRATING ORAL
Status: COMPLETED | OUTPATIENT
Start: 2022-04-28 | End: 2022-04-28

## 2022-04-28 RX ORDER — DEXAMETHASONE SODIUM PHOSPHATE 4 MG/ML
INJECTION, SOLUTION INTRA-ARTICULAR; INTRALESIONAL; INTRAMUSCULAR; INTRAVENOUS; SOFT TISSUE
Status: DISCONTINUED | OUTPATIENT
Start: 2022-04-28 | End: 2022-04-28

## 2022-04-28 RX ORDER — MUPIROCIN 20 MG/G
OINTMENT TOPICAL 2 TIMES DAILY
Status: COMPLETED | OUTPATIENT
Start: 2022-04-28 | End: 2022-05-03

## 2022-04-28 RX ORDER — SODIUM CHLORIDE 9 MG/ML
INJECTION, SOLUTION INTRAVENOUS CONTINUOUS
Status: DISCONTINUED | OUTPATIENT
Start: 2022-04-28 | End: 2022-04-29

## 2022-04-28 RX ORDER — ACETAMINOPHEN 325 MG/1
650 TABLET ORAL EVERY 6 HOURS PRN
Status: DISCONTINUED | OUTPATIENT
Start: 2022-04-28 | End: 2022-04-28

## 2022-04-28 RX ORDER — FAMOTIDINE 10 MG/ML
20 INJECTION INTRAVENOUS 2 TIMES DAILY
Status: DISCONTINUED | OUTPATIENT
Start: 2022-04-28 | End: 2022-04-29

## 2022-04-28 RX ORDER — ENOXAPARIN SODIUM 100 MG/ML
40 INJECTION SUBCUTANEOUS EVERY 24 HOURS
Status: DISCONTINUED | OUTPATIENT
Start: 2022-04-28 | End: 2022-04-28

## 2022-04-28 RX ORDER — PHENYLEPHRINE HYDROCHLORIDE 10 MG/ML
INJECTION INTRAVENOUS
Status: DISCONTINUED | OUTPATIENT
Start: 2022-04-28 | End: 2022-04-28

## 2022-04-28 RX ORDER — AMOXICILLIN 250 MG
1 CAPSULE ORAL 2 TIMES DAILY
Status: DISCONTINUED | OUTPATIENT
Start: 2022-04-28 | End: 2022-05-05

## 2022-04-28 RX ORDER — ONDANSETRON 2 MG/ML
INJECTION INTRAMUSCULAR; INTRAVENOUS
Status: DISCONTINUED | OUTPATIENT
Start: 2022-04-28 | End: 2022-04-28

## 2022-04-28 RX ORDER — LABETALOL HCL 20 MG/4 ML
10 SYRINGE (ML) INTRAVENOUS
Status: DISCONTINUED | OUTPATIENT
Start: 2022-04-28 | End: 2022-04-28

## 2022-04-28 RX ORDER — SUCCINYLCHOLINE CHLORIDE 20 MG/ML
INJECTION INTRAMUSCULAR; INTRAVENOUS
Status: DISCONTINUED | OUTPATIENT
Start: 2022-04-28 | End: 2022-04-28

## 2022-04-28 RX ORDER — LABETALOL HCL 20 MG/4 ML
10 SYRINGE (ML) INTRAVENOUS EVERY 4 HOURS PRN
Status: DISCONTINUED | OUTPATIENT
Start: 2022-04-28 | End: 2022-04-29

## 2022-04-28 RX ORDER — DEXMEDETOMIDINE HYDROCHLORIDE 4 UG/ML
0-.7 INJECTION, SOLUTION INTRAVENOUS CONTINUOUS
Status: DISCONTINUED | OUTPATIENT
Start: 2022-04-29 | End: 2022-04-29

## 2022-04-28 RX ORDER — POLYETHYLENE GLYCOL 3350 17 G/17G
17 POWDER, FOR SOLUTION ORAL DAILY
Status: DISCONTINUED | OUTPATIENT
Start: 2022-04-28 | End: 2022-05-13 | Stop reason: HOSPADM

## 2022-04-28 RX ORDER — HYDROMORPHONE HYDROCHLORIDE 1 MG/ML
1 INJECTION, SOLUTION INTRAMUSCULAR; INTRAVENOUS; SUBCUTANEOUS EVERY 4 HOURS PRN
Status: DISCONTINUED | OUTPATIENT
Start: 2022-04-28 | End: 2022-05-01

## 2022-04-28 RX ORDER — DEXAMETHASONE SODIUM PHOSPHATE 4 MG/ML
4 INJECTION, SOLUTION INTRA-ARTICULAR; INTRALESIONAL; INTRAMUSCULAR; INTRAVENOUS; SOFT TISSUE EVERY 6 HOURS
Status: DISCONTINUED | OUTPATIENT
Start: 2022-04-29 | End: 2022-04-28

## 2022-04-28 RX ORDER — NEBULIZER AND COMPRESSOR
1 EACH MISCELLANEOUS 2 TIMES DAILY
Status: DISCONTINUED | OUTPATIENT
Start: 2022-04-28 | End: 2022-05-01

## 2022-04-28 RX ORDER — LIDOCAINE HYDROCHLORIDE AND EPINEPHRINE 10; 10 MG/ML; UG/ML
INJECTION, SOLUTION INFILTRATION; PERINEURAL
Status: DISCONTINUED | OUTPATIENT
Start: 2022-04-28 | End: 2022-04-28 | Stop reason: HOSPADM

## 2022-04-28 RX ORDER — HYDROMORPHONE HYDROCHLORIDE 2 MG/ML
0.5 INJECTION, SOLUTION INTRAMUSCULAR; INTRAVENOUS; SUBCUTANEOUS
Status: COMPLETED | OUTPATIENT
Start: 2022-04-28 | End: 2022-04-28

## 2022-04-28 RX ORDER — EPHEDRINE SULFATE 50 MG/ML
INJECTION, SOLUTION INTRAVENOUS
Status: DISCONTINUED | OUTPATIENT
Start: 2022-04-28 | End: 2022-04-28

## 2022-04-28 RX ORDER — ACETAMINOPHEN 10 MG/ML
INJECTION, SOLUTION INTRAVENOUS
Status: DISCONTINUED | OUTPATIENT
Start: 2022-04-28 | End: 2022-04-28

## 2022-04-28 RX ORDER — DEXAMETHASONE SODIUM PHOSPHATE 4 MG/ML
4 INJECTION, SOLUTION INTRA-ARTICULAR; INTRALESIONAL; INTRAMUSCULAR; INTRAVENOUS; SOFT TISSUE EVERY 6 HOURS
Status: DISCONTINUED | OUTPATIENT
Start: 2022-04-29 | End: 2022-05-02

## 2022-04-28 RX ORDER — HYDROMORPHONE HYDROCHLORIDE 2 MG/ML
1 INJECTION, SOLUTION INTRAMUSCULAR; INTRAVENOUS; SUBCUTANEOUS
Status: COMPLETED | OUTPATIENT
Start: 2022-04-28 | End: 2022-04-28

## 2022-04-28 RX ORDER — SILODOSIN 4 MG/1
4 CAPSULE ORAL DAILY
Status: DISCONTINUED | OUTPATIENT
Start: 2022-04-29 | End: 2022-05-13 | Stop reason: HOSPADM

## 2022-04-28 RX ORDER — LOPERAMIDE HYDROCHLORIDE 2 MG/1
4 CAPSULE ORAL ONCE
Status: COMPLETED | OUTPATIENT
Start: 2022-04-28 | End: 2022-04-28

## 2022-04-28 RX ORDER — ACETAMINOPHEN 500 MG
1000 TABLET ORAL EVERY 8 HOURS
Status: DISCONTINUED | OUTPATIENT
Start: 2022-04-29 | End: 2022-05-01

## 2022-04-28 RX ORDER — IPRATROPIUM BROMIDE AND ALBUTEROL SULFATE 2.5; .5 MG/3ML; MG/3ML
3 SOLUTION RESPIRATORY (INHALATION) EVERY 6 HOURS
Status: DISCONTINUED | OUTPATIENT
Start: 2022-04-29 | End: 2022-05-13 | Stop reason: HOSPADM

## 2022-04-28 RX ORDER — ACETAMINOPHEN 325 MG/1
650 TABLET ORAL EVERY 6 HOURS PRN
Status: DISCONTINUED | OUTPATIENT
Start: 2022-04-28 | End: 2022-05-13 | Stop reason: HOSPADM

## 2022-04-28 RX ORDER — OXYCODONE HYDROCHLORIDE 5 MG/1
5 TABLET ORAL EVERY 4 HOURS PRN
Status: DISCONTINUED | OUTPATIENT
Start: 2022-04-28 | End: 2022-04-28

## 2022-04-28 RX ORDER — PREGABALIN 50 MG/1
50 CAPSULE ORAL 3 TIMES DAILY
Status: DISCONTINUED | OUTPATIENT
Start: 2022-04-28 | End: 2022-04-29

## 2022-04-28 RX ORDER — ONDANSETRON 8 MG/1
8 TABLET, ORALLY DISINTEGRATING ORAL EVERY 8 HOURS PRN
Status: DISCONTINUED | OUTPATIENT
Start: 2022-04-28 | End: 2022-04-28

## 2022-04-28 RX ORDER — GADOBUTROL 604.72 MG/ML
8 INJECTION INTRAVENOUS
Status: COMPLETED | OUTPATIENT
Start: 2022-04-28 | End: 2022-04-28

## 2022-04-28 RX ORDER — PROPOFOL 10 MG/ML
VIAL (ML) INTRAVENOUS CONTINUOUS PRN
Status: DISCONTINUED | OUTPATIENT
Start: 2022-04-28 | End: 2022-04-28

## 2022-04-28 RX ORDER — FENTANYL CITRATE 50 UG/ML
25 INJECTION, SOLUTION INTRAMUSCULAR; INTRAVENOUS
Status: DISCONTINUED | OUTPATIENT
Start: 2022-04-29 | End: 2022-05-01

## 2022-04-28 RX ORDER — FENTANYL CITRATE 50 UG/ML
12.5 INJECTION, SOLUTION INTRAMUSCULAR; INTRAVENOUS
Status: COMPLETED | OUTPATIENT
Start: 2022-04-28 | End: 2022-04-28

## 2022-04-28 RX ORDER — ONDANSETRON 2 MG/ML
4 INJECTION INTRAMUSCULAR; INTRAVENOUS EVERY 12 HOURS PRN
Status: DISCONTINUED | OUTPATIENT
Start: 2022-04-28 | End: 2022-04-28

## 2022-04-28 RX ADMIN — ASPIRIN 81 MG: 81 TABLET, COATED ORAL at 09:04

## 2022-04-28 RX ADMIN — GLYCOPYRROLATE 0.2 MG: 0.2 INJECTION, SOLUTION INTRAMUSCULAR; INTRAVITREAL at 04:04

## 2022-04-28 RX ADMIN — CEFAZOLIN 2 G: 330 INJECTION, POWDER, FOR SOLUTION INTRAMUSCULAR; INTRAVENOUS at 05:04

## 2022-04-28 RX ADMIN — ACETAMINOPHEN 1000 MG: 10 INJECTION, SOLUTION INTRAVENOUS at 07:04

## 2022-04-28 RX ADMIN — IOHEXOL 100 ML: 350 INJECTION, SOLUTION INTRAVENOUS at 04:04

## 2022-04-28 RX ADMIN — HYDROMORPHONE HYDROCHLORIDE 1 MG: 2 INJECTION INTRAMUSCULAR; INTRAVENOUS; SUBCUTANEOUS at 07:04

## 2022-04-28 RX ADMIN — LIDOCAINE HYDROCHLORIDE 80 MG: 20 INJECTION, SOLUTION INTRAVENOUS at 04:04

## 2022-04-28 RX ADMIN — MUPIROCIN: 20 OINTMENT TOPICAL at 09:04

## 2022-04-28 RX ADMIN — SUCCINYLCHOLINE CHLORIDE 100 MG: 20 INJECTION, SOLUTION INTRAMUSCULAR; INTRAVENOUS at 04:04

## 2022-04-28 RX ADMIN — SODIUM CHLORIDE, SODIUM GLUCONATE, SODIUM ACETATE, POTASSIUM CHLORIDE, MAGNESIUM CHLORIDE, SODIUM PHOSPHATE, DIBASIC, AND POTASSIUM PHOSPHATE: .53; .5; .37; .037; .03; .012; .00082 INJECTION, SOLUTION INTRAVENOUS at 04:04

## 2022-04-28 RX ADMIN — PRAVASTATIN SODIUM 10 MG: 10 TABLET ORAL at 09:04

## 2022-04-28 RX ADMIN — DEXMEDETOMIDINE HYDROCHLORIDE 0.2 MCG/KG/HR: 4 INJECTION INTRAVENOUS at 11:04

## 2022-04-28 RX ADMIN — ONDANSETRON 4 MG: 4 TABLET, ORALLY DISINTEGRATING ORAL at 07:04

## 2022-04-28 RX ADMIN — Medication: at 09:04

## 2022-04-28 RX ADMIN — PIPERACILLIN AND TAZOBACTAM 4.5 G: 4; .5 INJECTION, POWDER, LYOPHILIZED, FOR SOLUTION INTRAVENOUS; PARENTERAL at 12:04

## 2022-04-28 RX ADMIN — Medication 1662 UNITS: at 04:04

## 2022-04-28 RX ADMIN — FAMOTIDINE 20 MG: 10 INJECTION INTRAVENOUS at 10:04

## 2022-04-28 RX ADMIN — DEXAMETHASONE SODIUM PHOSPHATE 12 MG: 4 INJECTION, SOLUTION INTRAMUSCULAR; INTRAVENOUS at 05:04

## 2022-04-28 RX ADMIN — EPHEDRINE SULFATE 10 MG: 50 INJECTION INTRAVENOUS at 07:04

## 2022-04-28 RX ADMIN — DEXAMETHASONE SODIUM PHOSPHATE 4 MG: 4 INJECTION INTRA-ARTICULAR; INTRALESIONAL; INTRAMUSCULAR; INTRAVENOUS; SOFT TISSUE at 11:04

## 2022-04-28 RX ADMIN — REMIFENTANIL HYDROCHLORIDE 0.2 MCG/KG/MIN: 1 INJECTION, POWDER, LYOPHILIZED, FOR SOLUTION INTRAVENOUS at 04:04

## 2022-04-28 RX ADMIN — SODIUM CHLORIDE 0.25 MCG/KG/MIN: 9 INJECTION, SOLUTION INTRAVENOUS at 04:04

## 2022-04-28 RX ADMIN — AZITHROMYCIN MONOHYDRATE 500 MG: 250 TABLET ORAL at 09:04

## 2022-04-28 RX ADMIN — LOPERAMIDE HYDROCHLORIDE 4 MG: 2 CAPSULE ORAL at 07:04

## 2022-04-28 RX ADMIN — SODIUM CHLORIDE 1000 ML: 0.9 INJECTION, SOLUTION INTRAVENOUS at 04:04

## 2022-04-28 RX ADMIN — SODIUM CHLORIDE, SODIUM GLUCONATE, SODIUM ACETATE, POTASSIUM CHLORIDE, MAGNESIUM CHLORIDE, SODIUM PHOSPHATE, DIBASIC, AND POTASSIUM PHOSPHATE: .53; .5; .37; .037; .03; .012; .00082 INJECTION, SOLUTION INTRAVENOUS at 06:04

## 2022-04-28 RX ADMIN — GLYCOPYRROLATE 0.2 MG: 0.2 INJECTION, SOLUTION INTRAMUSCULAR; INTRAVITREAL at 07:04

## 2022-04-28 RX ADMIN — SODIUM CHLORIDE SOLN NEBU 3% 4 ML: 3 NEBU SOLN at 09:04

## 2022-04-28 RX ADMIN — ETHAMBUTOL HYDROCHLORIDE 800 MG: 400 TABLET, FILM COATED ORAL at 09:04

## 2022-04-28 RX ADMIN — SODIUM CHLORIDE: 0.9 INJECTION, SOLUTION INTRAVENOUS at 09:04

## 2022-04-28 RX ADMIN — Medication 1 DEVICE: at 09:04

## 2022-04-28 RX ADMIN — FENTANYL CITRATE 12.5 MCG: 50 INJECTION, SOLUTION INTRAMUSCULAR; INTRAVENOUS at 04:04

## 2022-04-28 RX ADMIN — FENTANYL CITRATE 50 MCG: 50 INJECTION, SOLUTION INTRAMUSCULAR; INTRAVENOUS at 07:04

## 2022-04-28 RX ADMIN — VANCOMYCIN HYDROCHLORIDE 1500 MG: 1.5 INJECTION, POWDER, LYOPHILIZED, FOR SOLUTION INTRAVENOUS at 12:04

## 2022-04-28 RX ADMIN — HYDROMORPHONE HYDROCHLORIDE 0.5 MG: 2 INJECTION INTRAMUSCULAR; INTRAVENOUS; SUBCUTANEOUS at 05:04

## 2022-04-28 RX ADMIN — POLYETHYLENE GLYCOL 3350 17 G: 17 POWDER, FOR SOLUTION ORAL at 09:04

## 2022-04-28 RX ADMIN — HYDROMORPHONE HYDROCHLORIDE 1 MG: 1 INJECTION, SOLUTION INTRAMUSCULAR; INTRAVENOUS; SUBCUTANEOUS at 10:04

## 2022-04-28 RX ADMIN — GADOBUTROL 8 ML: 604.72 INJECTION INTRAVENOUS at 09:04

## 2022-04-28 RX ADMIN — ONDANSETRON HYDROCHLORIDE 4 MG: 2 INJECTION INTRAMUSCULAR; INTRAVENOUS at 05:04

## 2022-04-28 RX ADMIN — Medication 150 MCG/KG/MIN: at 04:04

## 2022-04-28 RX ADMIN — PHENYLEPHRINE HYDROCHLORIDE 100 MCG: 10 INJECTION INTRAVENOUS at 06:04

## 2022-04-28 RX ADMIN — PROPOFOL 150 MG: 10 INJECTION, EMULSION INTRAVENOUS at 04:04

## 2022-04-28 RX ADMIN — PHYTONADIONE 10 MG: 10 INJECTION, EMULSION INTRAMUSCULAR; INTRAVENOUS; SUBCUTANEOUS at 04:04

## 2022-04-28 NOTE — SUBJECTIVE & OBJECTIVE
"  Woke up with symptoms?: yes    Recent bleeding noted: no  Does the patient take any Blood Thinners? unknown  Medications: Unknown      Past Medical History: hypertension    Past Surgical History: no relevant surgical history    Family History: hypertension    Social History: unable to obtain    Allergies: No Known Allergies No relevant allergies    Review of Systems  Objective:   Vitals: Blood pressure (!) 167/81, pulse 60, temperature 98.7 °F (37.1 °C), temperature source Oral, resp. rate 13, height 5' 9" (1.753 m), weight 72.1 kg (159 lb), SpO2 98 %. BP: chart reviewed    CT READ: Yes  No hemmorhage. No mass effect. No early infarct signs.     Physical Exam      Right arm and leg drift     "

## 2022-04-28 NOTE — ED NOTES
Pt is able to move right arm after pain medication, pt states he feels no pain and is able to move right arm more,

## 2022-04-28 NOTE — Clinical Note
Diagnosis: Stroke-like symptoms [283981]  Future Attending Provider: ANTOINE RIBEIRO [[id number]]  Admitting Provider:: ANTOINE RIBEIRO [[id number]]

## 2022-04-28 NOTE — ED NOTES
Surgery and blood consents signed and witnessed and at bedside. OR checklist completed and at bedside

## 2022-04-28 NOTE — ASSESSMENT & PLAN NOTE
No tPA - out of window   No LVO on CTA head and neck   Antithrombotics for secondary stroke prevention: Antiplatelets: Aspirin: 81 mg daily  Clopidogrel: 300 mg loading dose x 1, now  Clopidogrel: 75 mg daily    Statins for secondary stroke prevention and hyperlipidemia, if present:   Statins: Atorvastatin- 40 mg daily    Aggressive risk factor modification: HTN     Rehab efforts: The patient has been evaluated by a stroke team provider and the therapy needs have been fully considered based off the presenting complaints and exam findings. The following therapy evaluations are needed: PT evaluate and treat, OT evaluate and treat, SLP evaluate and treat, PM&R evaluate for appropriate placement    Diagnostics ordered/pending: HgbA1C to assess blood glucose levels, Lipid Profile to assess cholesterol levels, MRI head without contrast to assess brain parenchyma, TTE to assess cardiac function/status , TSH to assess thyroid function    VTE prophylaxis: Heparin 5000 units SQ every 8 hours  Mechanical prophylaxis: Place SCDs    BP parameters: Infarct: No intervention, SBP <220

## 2022-04-28 NOTE — PT/OT/SLP EVAL
Speech Language Pathology Evaluation  Bedside Swallow    Patient Name:  Diego Gunter   MRN:  [id number]  Admitting Diagnosis: Acute ischemic left MCA stroke    Recommendations:                 General Recommendations:  ST f/u  Diet recommendations:  Regular, Thin   Aspiration Precautions: Meds whole 1 at a time and Standard aspiration precautions   General Precautions: Standard,    Communication strategies:  provide increased time to answer    History:     Past Medical History:   Diagnosis Date    Antiphospholipid syndrome 11/19/2021    Dysphagia     Hx of colonic polyps     followed by GI. Dr. Pham    Hyperlipidemia LDL goal <100     Overweight(278.02)     Prostate cancer september 2011    followed by urology, Dr. Rogers    Type II or unspecified type diabetes mellitus without mention of complication, not stated as uncontrolled     diet controlled       Past Surgical History:   Procedure Laterality Date    BRONCHOSCOPY N/A 10/15/2018    Procedure: BRONCHOSCOPY;  Surgeon: [facility] Diagnostic Provider;  Location: [facility] OR [address] [place] [address];  Service: Anesthesiology;  Laterality: N/A;    CATARACT EXTRACTION, BILATERAL  2014    PROSTATECTOMY         Social History: Patient lives with his wife and is indep for all ADL's.    Prior Intubation HX:  none    Modified Barium Swallow: none    Chest X-Rays:   4/28/22    Impression:     Previously identified right upper lobe cavitary lung lesion again noted, diminishing size, in addition there are multifocal areas of pulmonary opacity, areas of consolidation, airspace disease, and nodularity, involving the right hemithorax, overall demonstrating mild improvement.     Chronic lung changes bilaterally are again noted, and the left hemithorax appears stable.     Mediastinal adenopathy appears stable.     Findings referable to the liver for which correlation for chronic hepatic disease is needed again noted.     Cholelithiasis, there is no evidence for pericholecystic inflammatory  change.     Mild pericardial thickening or fluid anteriorly, without large pericardial effusion.     Prior diet: Unrestricted per pt and wife.    Subjective     Pt was alert and awake throughout entirety of tx session, wife present at b/s. The pt was able to recall all events that led to current admit, however, ST noted frequent moments of anomia/ word-finding deficits at the conversational level. The pt's wife stated the pt has had expressive deficits since admit with COVID in January of 2021.    Patient goals: Resume po intake     Pain/Comfort:  · Pain Rating 1: 0/10    Respiratory Status: Room air    Objective:     Oral Musculature Evaluation  · Oral Musculature: WFL  · Dentition: present and adequate  · Secretion Management: adequate  · Oral Labial Strength and Mobility: WFL  · Lingual Strength and Mobility: WFL  · Voice Prior to PO Intake: Clear and audible    Bedside Swallow Eval:   Consistencies Assessed:  · Thin liquids -One cup of water via straw  · Puree -One cup of applesauce  · Solids -One packet of james cracker     Oral Phase:   · WFL    Pharyngeal Phase:   · no overt clinical signs/symptoms of aspiration  · no overt clinical signs/symptoms of pharyngeal dysphagia    Compensatory Strategies  · None    Treatment: It should be noted that silent aspiration cannot be r/o via b/s assessment.    MD and RN notified via secure chat of diet recs fo regular with thin liquids. Per MD, pt should remain NPO at this time 2/2 abnormal cervical MRI and need for neurosurgery consult.    Assessment:     Diego Gunter is a 81 y.o. male with a dx of Acute ischemic left MCA stroke (although not confirmed on testing?) who presents with adequate oral motor fx for regular diet and thin liquids. Pt with what appears to be chronic expressive deficits, although pt presents with functional communication at the conversational level. ST will f/u x1.    Goals:   Multidisciplinary Problems     SLP Goals        Problem: SLP     Goal Priority Disciplines Outcome   SLP Goal    Low SLP Ongoing, Progressing   Description: Goals:  1. Pt will tolerate regular diet with thin liquids (once MD clears pt to resume po intake, with testing complete) w/o overt s/s of aspiration.                    Plan:     · Patient to be seen:  3 x/week   · Plan of Care expires:     · Plan of Care reviewed with:  patient   · SLP Follow-Up:  Yes       Discharge recommendations:      Barriers to Discharge:  None    Time Tracking:     SLP Treatment Date:   04/28/22  Speech Start Time:  1046  Speech Stop Time:  1110     Speech Total Time (min):  24 min    Billable Minutes: Eval Swallow and Oral Function 14 and Self Care/Home Management Training 10    04/28/2022

## 2022-04-28 NOTE — PT/OT/SLP PROGRESS
Physical Therapy      Patient Name:  Diego Gunter   MRN:  [id number]    Patient not seen at this time for PT eval secondary to (Pt currently in the ED with medical workup.). Will follow-up as appropriate.

## 2022-04-28 NOTE — ANESTHESIA PREPROCEDURE EVALUATION
[facility]  Anesthesia Pre-Operative Evaluation         Patient Name: Diego Gunter  YOB: 1940  MRN: [id number]    SUBJECTIVE:     Pre-operative evaluation for Procedure(s) (LRB):  LAMINECTOMY, SPINE, CERVICAL, POSTERIOR APPROACH C3-6 (Bilateral)     04/28/2022    Diego Gunter is a 81 y.o. male w/ a significant PMHx of prostate CA, antiphospholipid syndrome, PE (on Coumadin), hyperlipidemia, and DM T2.    Pt transferred from [facility] for neurosurgical evaluation.  Reports last seen normal 8:30 p.m. states he was having upper back and neck pain at approximately midnight and called his grandson complaining of pain and at around 2:00 a.m. began having right upper and lower extremity weakness.  States he was unable to move his right arm or leg which prompted them to call EMS.  Patient received MRI of the brain and spine with nonspecific posterior epidural fluid which may represent abscess versus hematoma.    Patient now presents for the above procedure(s).      LDA: None documented.       Peripheral IV - Single Lumen 04/28/22 0310 20 G Anterior;Right Forearm (Active)   Site Assessment Clean;Dry;Intact 04/28/22 1352   Extremity Assessment Distal to IV No abnormal discoloration;No redness;No swelling;No warmth 04/28/22 1352   Line Status Blood return noted;Saline locked;Flushed 04/28/22 1352   Dressing Status Clean;Intact;Dry 04/28/22 1352   Dressing Intervention Integrity maintained 04/28/22 1352   Number of days: 0            Peripheral IV - Single Lumen 04/28/22 1154 20 G Left Wrist (Active)   Site Assessment Clean;Dry;Intact 04/28/22 1352   Extremity Assessment Distal to IV No abnormal discoloration;No redness;No swelling;No warmth 04/28/22 1352   Line Status Blood return noted;Flushed;Saline locked 04/28/22 1352   Dressing Status Clean;Dry;Intact 04/28/22 1352   Dressing Intervention Integrity maintained 04/28/22 1352   Number of days: 0       Prev airway: None  documented.    Drips: None documented.      Patient Active Problem List   Diagnosis    History of prostate cancer    Hx of colonic polyps    Prediabetes    Allergic rhinitis    Anxiety    Hyperlipidemia associated with type 2 diabetes mellitus    Dysphagia, oropharyngeal    Atherosclerosis of aorta    Centrilobular emphysema    Cavitary lung disease    HILLARY (mycobacterium avium-intracellulare)    Mild cognitive impairment    Cognitive communication deficit    History of pulmonary embolism    Bradycardia    Mild pulmonary hypertension    Hypercoagulable state    Positive cardiolipin antibodies    Antiphospholipid syndrome    Functional belching disorder    Acute ischemic left MCA stroke    Acute thoracic back pain       Review of patient's allergies indicates:  No Known Allergies    Current Inpatient Medications:      Current Facility-Administered Medications on File Prior to Encounter   Medication Dose Route Frequency Provider Last Rate Last Admin    acetaminophen tablet 650 mg  650 mg Oral Q6H PRN Neville Rodriguez MD        azithromycin tablet 500 mg  500 mg Oral Daily Addis Velasquez NP   500 mg at 04/28/22 0959    ethambutoL tablet 800 mg  800 mg Oral Daily Addis Velasquez NP   800 mg at 04/28/22 0959    human prothrombin complex (PCC) (KCENTRA) 1,802.5 Units in sterile water for injection IVPB  25 Units/kg Intravenous Once Nicole Colbert PA-C        labetaloL injection 10 mg  10 mg Intravenous Q15 Min PRN Neville Rodriguez MD        mucus clearing device   Misc.(Non-Drug; Combo Route) BID Addis Velasquez NP   Given by Other at 04/28/22 0900    nebulizer and compressor Marcy 1 Device  1 Device Misc.(Non-Drug; Combo Route) BID Addis Velasquez NP   1 Device at 04/28/22 0900    ondansetron disintegrating tablet 8 mg  8 mg Oral Q8H PRN Neville Rodriguez MD        phytonadione vitamin k (AQUA-MEPHYTON) 10 mg in dextrose 5 % 50 mL IVPB  10 mg Intravenous Once Nicole Colbert  PA-C        polyethylene glycol packet 17 g  17 g Oral Daily Neville Rodriguez MD   17 g at 04/28/22 0959    pravastatin tablet 10 mg  10 mg Oral Daily Addis Velasquez NP   10 mg at 04/28/22 0959    sodium chloride 0.9% flush 10 mL  10 mL Intravenous PRN Neville Rodriguez MD        sodium chloride 3% nebulizer solution 4 mL  4 mL Nebulization BID Addis Velasquez NP   4 mL at 04/28/22 0924     Current Outpatient Medications on File Prior to Encounter   Medication Sig Dispense Refill    albuterol (PROVENTIL/VENTOLIN HFA) 90 mcg/actuation inhaler INHALE 2 PUFFS BY MOUTH EVERY 6 HOURS AS NEEDED FOR WHEEZING 9 g 0    ascorbic acid, vitamin C, (VITAMIN C) 1000 MG tablet Take 1,000 mg by mouth once daily.      aspirin (ECOTRIN) 81 MG EC tablet Take 81 mg by mouth.      b complex vitamins capsule Take 1 capsule by mouth once daily.      ethambutoL (MYAMBUTOL) 400 MG Tab Take 2 tablets (800 mg total) by mouth once daily. 60 tablet 5    mucus clearing device (AEROBIKA OSCILLATING PEP SYSTM) by Misc.(Non-Drug; Combo Route) route 2 (two) times a day. 1 Device 0    nebulizer and compressor ([employer] COMPRESSOR SYSTEM) Marcy use to administer nebulized medication as directed 1 each 0    omega 3-dha-epa-fish oil 1,000 (120-180) mg Cap Take 1 capsule by mouth once daily.      omega-3 fatty acids 1,000 mg Cap Take 2 g by mouth.      omeprazole 20 mg TbEC 1/2 tablet daily in am as needed. 45 each 0    pravastatin (PRAVACHOL) 10 MG tablet Take 1 tablet (10 mg total) by mouth once daily. 90 tablet 0    promethazine-dextromethorphan (PROMETHAZINE-DM) 6.25-15 mg/5 mL Syrp Take 10-15ml by mouth every 8 hours as needed for coughing 240 mL 0    sodium chloride 3% 3 % nebulizer solution USE 4 ML VIAL IN NEBULIZER  TWICE DAILY 480 mL 11    warfarin (COUMADIN) 3 MG tablet Take 1.5 tablets (4.5 mg total) by mouth Daily. As directed by coumadin clinic 45 tablet 11       Past Surgical History:   Procedure Laterality Date     BRONCHOSCOPY N/A 10/15/2018    Procedure: BRONCHOSCOPY;  Surgeon: Pratibha Diagnostic Provider;  Location: [facility] OR [address] [place] [address];  Service: Anesthesiology;  Laterality: N/A;    CATARACT EXTRACTION, BILATERAL      PROSTATECTOMY         Social History     Socioeconomic History    Marital status:     Number of children: 2   Occupational History    Occupation: tool    Tobacco Use    Smoking status: Former Smoker     Packs/day: 0.25     Years: 5.00     Pack years: 1.25     Quit date: 10/2/1962     Years since quittin.6    Smokeless tobacco: Never Used    Tobacco comment: quit age 23   Substance and Sexual Activity    Alcohol use: Yes     Comment: beer occasionally    Drug use: No    Sexual activity: Yes     Partners: Female       OBJECTIVE:     Vital Signs Range (Last 24H):  Temp:  [36.9 °C (98.4 °F)-37.1 °C (98.7 °F)]   Pulse:  [45-97]   Resp:  [8-25]   BP: ()/(56-81)   SpO2:  [92 %-100 %]       Significant Labs:  Lab Results   Component Value Date    WBC 7.81 2022    WBC 7.81 2022    HGB 13.5 (L) 2022    HGB 13.5 (L) 2022    HCT 41.5 2022    HCT 41.5 2022     2022     2022    CHOL 116 (L) 2022    TRIG 88 2022    HDL 36 (L) 2022    ALT 28 2022    AST 28 2022     2022    K 3.8 2022     2022    CREATININE 0.9 2022    BUN 9 2022    CO2 24 2022    TSH 3.649 2022    PSA 0.02 10/28/2015    INR 3.1 (H) 2022    HGBA1C 5.7 (H) 2022       Diagnostic Studies: No relevant studies.    EKG:   Results for orders placed or performed during the hospital encounter of 22   ECG 12 lead    Collection Time: 22  5:15 AM    Narrative    Test Reason : I63.9,    Vent. Rate : 057 BPM     Atrial Rate : 057 BPM     P-R Int : 172 ms          QRS Dur : 088 ms      QT Int : 476 ms       P-R-T Axes : 069 077 070 degrees     QTc Int : 463 ms    Sinus  bradycardia  Otherwise normal ECG  When compared with ECG of 30-JAN-2021 15:15,  No significant change was found    Confirmed by Lucian Jones MD ([id number]) on 4/28/2022 10:58:27 AM    Referred By: [email or web address]   SELF           Confirmed By:Lucian Jones MD       2D ECHO:  TTE:  Results for orders placed or performed during the hospital encounter of 12/12/19   Echo   Result Value Ref Range    TDI SEPTAL 0.08 m/s    LV LATERAL E/E' RATIO 7.33 m/s    LV SEPTAL E/E' RATIO 11.00 m/s    LA WIDTH 3.58 cm    AORTIC VALVE CUSP SEPERATION 2.14 cm    TDI LATERAL 0.12 m/s    PV PEAK VELOCITY 1.17 cm/s    LVIDd 3.90 3.5 - 6.0 cm    IVS 1.24 (A) 0.6 - 1.1 cm    Posterior Wall 1.24 (A) 0.6 - 1.1 cm    Ao root annulus 3.10 cm    LVIDs 2.41 2.1 - 4.0 cm    FS 38 28 - 44 %    LA volume 45.52 cm3    Sinus 2.95 cm    STJ 2.71 cm    Ascending aorta 2.72 cm    LV mass 167.31 g    LA size 3.13 cm    RVDD 3.84 cm    TAPSE 1.81 cm    RV S' 14.51 cm/s    Left Ventricle Relative Wall Thickness 0.64 cm    AV mean gradient 3 mmHg    AV valve area 2.87 cm2    AV Velocity Ratio 0.77     AV index (prosthetic) 0.77     E/A ratio 1.09     Mean e' 0.10 m/s    E wave deceleration time 197.38 msec    IVRT 0.10 msec    Pulm vein S/D ratio 1.24     LVOT diameter 2.18 cm    LVOT area 3.7 cm2    LVOT peak jenna 0.90 m/s    LVOT peak VTI 23.72 cm    Ao peak jenna 1.17 m/s    Ao VTI 30.84 cm    LVOT stroke volume 88.49 cm3    AV peak gradient 5 mmHg    E/E' ratio 8.80 m/s    MV Peak E Jenna 0.88 m/s    TR Max Jenna 2.59 m/s    MV Peak A Jenna 0.81 m/s    PV Peak S Jenna 0.52 m/s    PV Peak D Jenna 0.42 m/s    LV Systolic Volume 20.38 mL    LV Diastolic Volume 66.05 mL    RA Major Axis 5.19 cm    Left Atrium Minor Axis 4.72 cm    Left Atrium Major Axis 4.84 cm    Triscuspid Valve Regurgitation Peak Gradient 27 mmHg    RA Width 3.41 cm    Right Atrial Pressure (from IVC) 3 mmHg    TV rest pulmonary artery pressure 30 mmHg    Narrative    · Concentric left ventricular  hypertrophy.  · Normal left ventricular systolic function. The estimated ejection   fraction is 60%  · No wall motion abnormalities.  · Normal LV diastolic function.  · Normal right ventricular systolic function.  · Mild pulmonary hypertension present.  · Mild pulmonic regurgitation.  · Normal central venous pressure (3 mm Hg).  · The estimated PA systolic pressure is 30 mm Hg  · Negative bubble study.          ANNETTE:  No results found for this or any previous visit.    ASSESSMENT/PLAN:                                                                                                                  04/28/2022  Diego Gunter is a 81 y.o., male.      Pre-op Assessment    I have reviewed the Patient Summary Reports.     I have reviewed the Nursing Notes.    I have reviewed the Medications.     Review of Systems  Anesthesia Hx:  No problems with previous Anesthesia Denies Hx of Anesthetic complications  Neg history of prior surgery. Denies Family Hx of Anesthesia complications.   Denies Personal Hx of Anesthesia complications.   Social:  Non-Smoker, No Alcohol Use    Hematology/Oncology:  Hematology Normal   Oncology Normal     EENT/Dental:EENT/Dental Normal   Cardiovascular:   Exercise tolerance: good hyperlipidemia    Pulmonary:   COPD    Renal/:  Renal/ Normal     Hepatic/GI:  Hepatic/GI Normal    Musculoskeletal:  Musculoskeletal Normal    Neurological:   CVA    Endocrine:   Diabetes    Dermatological:  Skin Normal    Psych:  Psychiatric Normal           Physical Exam  General: Well nourished, Cooperative, Alert and Oriented    Airway:  Mallampati: II   Mouth Opening: Normal  TM Distance: Normal  Tongue: Normal  Neck ROM: Normal ROM    Chest/Lungs:  Clear to auscultation, Normal Respiratory Rate    Heart:  Rate: Normal        Anesthesia Plan  Type of Anesthesia, risks & benefits discussed:    Anesthesia Type: Gen ETT  Intra-op Monitoring Plan: Standard ASA Monitors  Post Op Pain Control Plan: multimodal  analgesia and IV/PO Opioids PRN  Induction:  IV  Airway Plan: Direct, Post-Induction  Informed Consent: Informed consent signed with the Patient and all parties understand the risks and agree with anesthesia plan.  All questions answered.   ASA Score: 3 Emergent  Day of Surgery Review of History & Physical: H&P Update referred to the surgeon/provider.    Ready For Surgery From Anesthesia Perspective.     .

## 2022-04-28 NOTE — ED TRIAGE NOTES
Pt presents to [facility] ED #[id number] 82 yo male transferred from [facility] with dx of epidural abscess sent for neurosurgery consultation. Pt is accompanied by his wife and grandson. The grandson states he last saw him normal about 8:30 p.m. m. he called the grandson at 2:00 a.m. complaining of pain in his back.  Grandson noted that during that time he appeared to be somewhat dazed but was walking normally, he states that he went home got ready and came back in order to bring him to the hospital and at that time noted that the patient had complained that his right arm and right leg has stopped working. Pt still exhibits hypertonicity to the right upper extremity with weakness and weakness in the LLE. Pt reports no decrease/loss of sensation on either side. See NIH and neuro assessment. Pt received vancomycin and zosyn pta. VS WNL.

## 2022-04-28 NOTE — H&P
[facility]  History & Physical    Patient Name: Diego Gunter  MRN: [id number]  Patient Class: OP- Observation  Admission Date: 4/28/2022  Attending Physician: Robbie Dangelo MD   Primary Care Provider: Portia Ferreira MD         Patient information was obtained from patient and ER records.     Subjective:     Principal Problem:Acute ischemic left MCA stroke    Chief Complaint:   Chief Complaint   Patient presents with    Back Pain     Here via [facility] with c/o upper back pain and right arm pain, taking warfarin and azithromycin, Hx PE and mycobacterium        HPI: Mr. Gunter is a pleasant 81-year-old gentleman who has a past medical history PE on Coumadin, antiphospholipid syndrome, hyperlipidemia, type 2 diabetes who presents to the emergency room complaints of upper back neck pain, involving his right shoulder pain.  He also noted weakness of the right upper extremity, he had problems walking his legs felt weak.  This started around 2:00 a.m..  He was doing well waiting to be around 8:00 p.m. last night.  Patient became concerned about his symptoms we called his son who brought him to the emergency room for further evaluation.  The patient denied chest pains, shortness of breath, nausea, vomiting, loss of vision, or loss of bowel or bladder control.  The patient in the ER had laboratories and x-rays done.  Laboratories show patient to have TSH normal 3.6, COVID-19 screen is negative, CMP normal, creatinine 0.9, cholesterol 116, PT INR 3.1, CBC unremarkable, hemoglobin 13.5, platelet count 244.  Patient had CTA chest/abdomen/pelvis that showed no obvious dissection, no PE.  He had CT brain without contrast that showed no acute changes.  CT chest without contrast show previously identified right upper lobe cavitary lung lesion there is diminished in size., with pulmonary opacities, overall mildly improved.  CTA head and neck showed no evidence for high-grade stenosis or  occlusion or dissection.  No aneurysm.  There was concerns of possible changes C3-C6 with need to rule out epidural process.  Patient is recommended to have MRI brain and C-spine.  These have been ordered.  The patient was admitted to the hospital for possible TIA with NIH score of 1. Patient was seen by vascular surgery example was appreciated, no recommendation at this time other than MRI brain and C-spine.  Patient requests to be full code status.        Past Medical History:   Diagnosis Date    Antiphospholipid syndrome 11/19/2021    Dysphagia     Hx of colonic polyps     followed by GI. Dr. Pham    Hyperlipidemia LDL goal <100     Overweight(278.02)     Prostate cancer september 2011    followed by urology, Dr. Rogers    Type II or unspecified type diabetes mellitus without mention of complication, not stated as uncontrolled     diet controlled       Past Surgical History:   Procedure Laterality Date    BRONCHOSCOPY N/A 10/15/2018    Procedure: BRONCHOSCOPY;  Surgeon: Pratibha Diagnostic Provider;  Location: [facility] OR [address] [place] [address];  Service: Anesthesiology;  Laterality: N/A;    CATARACT EXTRACTION, BILATERAL  2014    PROSTATECTOMY         Review of patient's allergies indicates:  No Known Allergies    No current facility-administered medications on file prior to encounter.     Current Outpatient Medications on File Prior to Encounter   Medication Sig    albuterol (PROVENTIL/VENTOLIN HFA) 90 mcg/actuation inhaler INHALE 2 PUFFS BY MOUTH EVERY 6 HOURS AS NEEDED FOR WHEEZING    ascorbic acid, vitamin C, (VITAMIN C) 1000 MG tablet Take 1,000 mg by mouth once daily.    aspirin (ECOTRIN) 81 MG EC tablet Take 81 mg by mouth.    b complex vitamins capsule Take 1 capsule by mouth once daily.    ethambutoL (MYAMBUTOL) 400 MG Tab Take 2 tablets (800 mg total) by mouth once daily.    mucus clearing device (AEROBIKA OSCILLATING PEP SYSTM) by Misc.(Non-Drug; Combo Route) route 2 (two) times a day.    nebulizer and  compressor ([employer] COMPRESSOR SYSTEM) Marcy use to administer nebulized medication as directed    omega 3-dha-epa-fish oil 1,000 (120-180) mg Cap Take 1 capsule by mouth once daily.    omega-3 fatty acids 1,000 mg Cap Take 2 g by mouth.    omeprazole 20 mg TbEC 1/2 tablet daily in am as needed.    pravastatin (PRAVACHOL) 10 MG tablet Take 1 tablet (10 mg total) by mouth once daily.    promethazine-dextromethorphan (PROMETHAZINE-DM) 6.25-15 mg/5 mL Syrp Take 10-15ml by mouth every 8 hours as needed for coughing    sodium chloride 3% 3 % nebulizer solution USE 4 ML VIAL IN NEBULIZER  TWICE DAILY    warfarin (COUMADIN) 3 MG tablet Take 1.5 tablets (4.5 mg total) by mouth Daily. As directed by coumadin clinic     Family History       Problem Relation (Age of Onset)    Colon cancer Mother    Dementia Brother    Diabetes Mother, Sister, , Brother, Brother, Maternal Aunt    Heart disease Father    Lung cancer Brother    Mental illness Sister    Myasthenia gravis Brother    Other Brother    Parkinsonism Brother    Pulmonary fibrosis Brother          Tobacco Use    Smoking status: Former Smoker     Packs/day: 0.25     Years: 5.00     Pack years: 1.25     Quit date: 10/2/1962     Years since quittin.6    Smokeless tobacco: Never Used    Tobacco comment: quit age 23   Substance and Sexual Activity    Alcohol use: Yes     Comment: beer occasionally    Drug use: No    Sexual activity: Yes     Partners: Female     Review of Systems   Constitutional: Negative.    HENT:  Positive for hearing loss.         Hard of hearing, new recent hearing aids.   Eyes: Negative.    Respiratory: Negative.     Cardiovascular: Negative.    Gastrointestinal: Negative.    Endocrine: Negative.    Genitourinary: Negative.    Musculoskeletal: Negative.    Skin: Negative.    Allergic/Immunologic: Negative.    Neurological:  Positive for weakness.        Complain of right arm weakness and difficulty walking this morning, right leg weaker.   Started 2 a.m. today.   Hematological: Negative.    Psychiatric/Behavioral: Negative.     Objective:     Vital Signs (Most Recent):  Temp: 98.7 °F (37.1 °C) (04/28/22 0319)  Pulse: (!) 49 (04/28/22 0702)  Resp: 18 (04/28/22 0754)  BP: (!) 117/56 (04/28/22 0702)  SpO2: (!) 94 % (04/28/22 0702)   Vital Signs (24h Range):  Temp:  [98.7 °F (37.1 °C)] 98.7 °F (37.1 °C)  Pulse:  [49-64] 49  Resp:  [8-25] 18  SpO2:  [94 %-98 %] 94 %  BP: (112-167)/(56-81) 117/56     Weight: 72.1 kg (159 lb)  Body mass index is 23.48 kg/m².    Physical Exam  Vitals reviewed.   Constitutional:       Appearance: Normal appearance. He is normal weight.   HENT:      Head: Normocephalic and atraumatic.      Nose: Nose normal.      Mouth/Throat:      Mouth: Mucous membranes are moist.      Pharynx: Oropharynx is clear.   Eyes:      Extraocular Movements: Extraocular movements intact and EOM normal.      Conjunctiva/sclera: Conjunctivae normal.      Pupils: Pupils are equal, round, and reactive to light.   Cardiovascular:      Rate and Rhythm: Normal rate and regular rhythm.      Pulses: Normal pulses.      Heart sounds: Normal heart sounds.   Pulmonary:      Effort: Pulmonary effort is normal.      Breath sounds: Normal breath sounds.   Abdominal:      General: Abdomen is flat. Bowel sounds are normal.      Palpations: Abdomen is soft.   Musculoskeletal:         General: Normal range of motion.      Cervical back: Normal range of motion. Tenderness present.   Skin:     General: Skin is warm.   Neurological:      Mental Status: He is alert and oriented to person, place, and time.      Motor: Weakness present.      Comments: Mild right arm and right leg weakness   Psychiatric:         Mood and Affect: Mood normal.         Behavior: Behavior normal.         CRANIAL NERVES     CN I  normal olfaction    CN II   Visual fields full to confrontation.   Visual acuity: normal    CN III, IV, VI   Pupils are equal, round, and reactive to light.  Extraocular  motions are normal.     CN V   Facial sensation intact.   Right corneal reflex: normal    CN VII   Facial expression full, symmetric.     CN VIII   CN VIII normal.     CN IX, X   CN IX normal.     CN XI   CN XI normal.     CN XII   CN XII normal.      Significant Labs: All pertinent labs within the past 24 hours have been reviewed.  CBC:   Recent Labs   Lab 04/28/22 0417 04/28/22 0441   WBC  --  7.81  7.81   HGB  --  13.5*  13.5*   HCT 42 41.5  41.5   PLT  --  244  244     CMP:   Recent Labs   Lab 04/28/22  0441      K 3.8      CO2 24   *   BUN 9   CREATININE 0.9   CALCIUM 9.0   PROT 7.3   ALBUMIN 3.8   BILITOT 0.4   ALKPHOS 73   AST 28   ALT 28   ANIONGAP 10   EGFRNONAA >60     Lipid Panel:   Recent Labs   Lab 04/28/22 0441   CHOL 116*   HDL 36*   LDLCALC 62.4*   TRIG 88   CHOLHDL 31.0     Troponin: No results for input(s): TROPONINI in the last 48 hours.  Urine Studies: No results for input(s): COLORU, APPEARANCEUA, PHUR, SPECGRAV, PROTEINUA, GLUCUA, KETONESU, BILIRUBINUA, OCCULTUA, NITRITE, UROBILINOGEN, LEUKOCYTESUR, RBCUA, WBCUA, BACTERIA, SQUAMEPITHEL, HYALINECASTS in the last 48 hours.    Invalid input(s): WRIGHTSUR    Significant Imaging:       Narrative & Impression  EXAMINATION:  CTA CHEST ABDOMEN PELVIS     CLINICAL HISTORY:  Aortic dissection suspected;     TECHNIQUE:  CT examination of the chest abdomen and pelvis was performed via aortic angiographic protocol after the administration of 100 mL Omnipaque 350 intravenous contrast, timing of imaging optimized for evaluation of the aorta.  Axial imaging, sagittal and coronal reconstruction imaging is submitted.  Axial MIP imaging ting of the chest is submitted.     COMPARISON:  CT examination of the chest without contrast April 28, 2022, CTA examination chest abdomen and pelvis October 15, 2018     FINDINGS:  The thoracic and abdominal aorta demonstrate appropriate opacification, there is no evidence for acute aortic leak, aortic  dissection, or aneurysmal dilatation and no evidence for periaortic hematoma.  The visualized great vessels appear appropriate.  The pulmonary arterial vasculature appear appropriate.  The celiac artery, hepatic artery, and splenic artery demonstrate appropriate opacification as does the superior mesenteric artery which demonstrates mild narrowing at its origin without high-grade stenosis.  The renal arteries, inferior mesenteric arteries and iliac arterial vasculature appear appropriate.     Intrathoracic findings including cavitary lesion and abnormal areas of multifocal opacity and nodularity of the right lung appear stable when compared to the prior examination of the same date, the left hemithorax appears stable as well, referral to the earlier chest CT report is recommended.  Mediastinal adenopathy is again noted.  There is no pericardial effusion, there is no hemopericardium, there is no hemothorax.  There is no pneumothorax.     Diminished attenuation of the liver consistent with diffuse fatty infiltrate is noted, irregular contour of the liver again noted, correlation for chronic hepatic disease is needed.  Cholelithiasis is noted, there is no evidence for pericholecystic or peripancreatic inflammatory change.  The stomach appears unremarkable for degree of distention.  There is no evidence for acute process of the spleen or adrenal glands.  There is no hydronephrosis or obstructive uropathy bilaterally.  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  The urinary bladder appears unremarkable for degree of distention.     There is no evidence for small bowel obstructive process.  The appendix is identified, it does not appear inflamed.  Mild prominence of the colon with stool is noted.  Diverticula of the colon are noted.  There is a segment of the sigmoid colon as best seen on axial series 2, images 770 through 809 that demonstrates mild wall  thickening, this may relate to concentric muscular hypertrophy of diverticulosis, as significant pericolonic inflammatory change is not appreciated however correlation for signs or symptoms of mild colitis or diverticulitis is needed, if the patient has not had recent colon screening exam I would recommend follow-up colonoscopy or fluoroscopic barium exam to exclude the possibility of neoplasm.  There is no additional evidence for colonic inflammatory or obstructive change.  There is no free intraperitoneal air.  The visualized osseous structures demonstrate chronic change.     Impression:     The thoracic and abdominal aorta demonstrate no evidence for aneurysmal dilatation, aortic dissection or acute aortic injury.     Intrathoracic findings appears stable when compared to the CT examination of the chest of the same date, referral to the chest CT of the same date report is recommended.     Diverticula of the colon are noted in there is a segment of the sigmoid colon demonstrating mild wall thickening, this may relate to concentric muscular hypertrophy of diverticulosis however correlation for mild diverticulitis is needed, if the patient has not had recent colon screening exam I would recommend follow-up colonoscopy or fluoroscopic barium examination to exclude neoplasm.     Irregular contour of the liver again noted, correlation for chronic hepatic disease is needed.     Cholelithiasis is noted, there is no evidence for pericholecystic inflammatory change.     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     This report was flagged in Epic as abnormal.     The findings were reported to Dr Joyce in the ER at the time of dictation.        Electronically signed by: Rigoberto De Jesus  Date:                                            04/28/2022  Time:                                           05:38      Narrative & Impression  EXAMINATION:  CTA HEAD AND NECK (XPD)     CLINICAL  HISTORY:  Neuro deficit, acute, stroke suspected;     TECHNIQUE:  Non contrast low dose axial images were obtained through the head.  CT angiogram was performed from the level of the nae to the top of the head following the IV administration of 100mL of Omnipaque 350.   Sagittal and coronal reconstructions and maximum intensity projection reconstructions were performed. Arterial stenosis percentages are based on NASCET measurement criteria.     COMPARISON:  CT examination of the brain without contrast April 28, 2022, CT examination of the chest without contrast April 28, 2022     FINDINGS:  The vertebral arteries bilaterally demonstrate appropriate opacification extending to the level of the basilar artery which also demonstrates appropriate opacification.  The P1 segment of the posterior cerebral artery bilaterally appears hypoplastic, and there appear to be bilateral posterior communicating arteries, otherwise the PCA bilaterally demonstrate appropriate opacification.     The common, external and internal carotid arteries bilaterally demonstrate appropriate opacification, the internal carotid arteries demonstrate opacification to the level of the [id number].  There is no evidence for high-grade stenosis or occlusion or dissection of the internal carotid arteries.  The anterior cerebral artery and middle cerebral artery bilaterally demonstrate appropriate opacification.  There is suggestion of a thin small anterior communicating artery.     There is no evidence for intracranial aneurysm or AVM.  The intracranial venous structures appear appropriate.     The visualized orbits appear appropriate.  The paranasal sinuses and mastoid air cells appear appropriate.  The parapharyngeal space bilaterally appears clear, there is no evidence for retropharyngeal abscess.  There is no abnormal thickening of the epiglottis.  The parotid, submandibular and thyroid glands appear appropriate.  The visualized osseous  structures demonstrate chronic change.  Findings referable to the lung apices are discussed on the CT examination of the chest of the same date, referral to the chest CT report is recommended.     As best seen on axial series 3 and axial series 2 along the posterior aspect of the spinal canal, and appearing to extend to the right there is subtle suggestion of accentuated attenuation, along the cervical spinal canal extending from approximately C3 to C6.  This is also suggested on sagittal series 602.  This may be artifactual however an intraspinal epidural process to include epidural fluid collection, including the possibility of intraspinal epidural hematoma or abscess cannot be excluded based upon the appearance.  MRI examination of the cervical spine with contrast is recommended for further evaluation.     Impression:     The major intracranial and extracranial arterial vascular structures of the head and neck demonstrate no evidence for high-grade stenosis or occlusion, or dissection, and there is no evidence for intracranial aneurysm or AVM.     There is a finding of for a to the cervical spine extending from C3-C6 that may be artifact however the appearance is most concerning for the possibility of an intraspinal epidural process to include the possibility of epidural fluid collection, abscess or hematoma, clinical and historical correlation is needed, MRI examination of the cervical spine with contrast is recommended.     This report was flagged in Epic as abnormal.     The findings were reported to Dr Joyce in the ER at the time of dictation.        Electronically signed by: Rigoberto De Jesus  Date:                                            04/28/2022  Time:                                           05:38             Exam Ended: 04/28/22 04:43             EXAMINATION:  CT CHEST WITHOUT CONTRAST     CLINICAL HISTORY:  back pain, neurologic deficits;     TECHNIQUE:  Low dose axial images, sagittal and coronal  reformations were obtained from the thoracic inlet to the lung bases. Contrast was not administered.  The lack of intravenous contrast diminishes the sensitivity of the examination.     COMPARISON:  CT examination of the chest without contrast November 2, 2021     FINDINGS:  The previously identified cavitary lesion of the right upper lobe is again noted, it appears diminished in size when compared to the prior examination, when measured at a similar level to the prior study it measures approximately 1.7 by 3.4 cm on axial imaging compared to 3.3 x 4.4 cm on the prior exam.  There is appearance consistent with bronchial communication to the cavitary lesion, appearing similar to the prior exam.  This is somewhat more conspicuous at this time.  There is continued appearance of surrounding patchy and confluent and nodular opacities with multifocal airspace opacities and of the right lung, the overall pattern and distribution is similar to the prior study, however the overall appearance is that of improvement.     Chronic appearing lung changes are again noted.  There are mild atelectatic changes noted as well.  The left hemithorax appears stable.  There is no evidence for significant pleural effusion and there is no evidence for pneumothorax.     Mediastinal adenopathy is again noted appearing stable.  There is mild greater thickening or fluid along the pericardium anteriorly.  Otherwise the appearance of the heart and great vessels is stable, evaluation is limited on this noncontrast exam.     Limited imaging of the upper abdomen demonstrates evidence for cholelithiasis without evidence for pericholecystic inflammatory change.  Irregular contour of the liver again noted, correlation for chronic hepatic disease is needed.  The visualized osseous structures appear intact.  Chronic changes are noted.     Impression:     Previously identified right upper lobe cavitary lung lesion again noted, diminishing size, in addition  there are multifocal areas of pulmonary opacity, areas of consolidation, airspace disease, and nodularity, involving the right hemithorax, overall demonstrating mild improvement.     Chronic lung changes bilaterally are again noted, and the left hemithorax appears stable.     Mediastinal adenopathy appears stable.     Findings referable to the liver for which correlation for chronic hepatic disease is needed again noted.     Cholelithiasis, there is no evidence for pericholecystic inflammatory change.     Mild pericardial thickening or fluid anteriorly, without large pericardial effusion.        Electronically signed by: Rigoberto De Jesus  Date:                                            04/28/2022  Time:                                           04:26             Exam Ended: 04/28/22 04:02               EXAMINATION:  CT HEAD WITHOUT CONTRAST     CLINICAL HISTORY:  Neuro deficit, acute, stroke suspected;     TECHNIQUE:  Low dose axial images were obtained through the head.  Coronal and sagittal reformations were also performed. Contrast was not administered.     COMPARISON:  CT examination of the brain without contrast February 2, 2021     FINDINGS:  The ventricular system, sulcal pattern and parenchymal attenuation characteristics are consistent with chronic change.  Involutional changes noted, chronic appearing white matter change noted.  There is no evidence for superimposed acute process.  There is no evidence for intracranial mass, mass effect or midline shift, there is no evidence for acute intracranial hemorrhage.  Appropriate CSF spaces are seen at the skull base.     The visualized orbits appear intact.  The mastoid air cells and middle ear cavity bilaterally appear appropriate, as do the paranasal sinuses.  The visualized osseous structures appear intact.     Impression:     Chronic changes are noted, there is no evidence for superimposed acute intracranial process.        Electronically signed by: Rigoberto  Liza  Date:                                            04/28/2022  Time:                                           04:07          Assessment/Plan:     * Acute ischemic left MCA stroke  Patient has acute metabolic encephalopathy that is secondary to Cerebral ischemia. Patient's current mental status is Oriented person. Patient's baseline mental status is. awake and alert; oriented to person, place, and time Evaluation and for underlying cause(s) is underway and inclusive of Neuro-Imaging (MRI/CT). Will monitor neuro checks carefully, avoid narcotics and benzos that will exacerbate agitation, and use PRN medications for controls of behavior for self harm.   -CTA head and neck, no aneurysm or stenosis  -CTA chest/abdomen/pelvis, no PE, no dissection, questionable changes C3-C6 with need to rule out epidural process.  -MRI brain and C-spine ordered  -neurology consultation  -patient on Coumadin, INR 3.1  -added aspirin, statin  -CVA/TIA order set  -echo ordered  -neuro checks      Acute thoracic back pain  -patient seen by vascular Surgical input appreciated.  No recommendation for intervention  -CTA chest/abdomen/pelvis showed no dissection, no PE, questionable changes C3-C6 epidural process.    -MRI brain and C-spine ordered       Antiphospholipid syndrome        Hypercoagulable state  -history of antiphospholipid syndrome  -history of PE  -patient on Coumadin, INR 3.1      History of pulmonary embolism  -patient on Coumadin, INR 3.1      HILLARY (mycobacterium avium-intracellulare)  -CT chest without contrast shows improvement of previous cavitary lesion and infiltrates  -patient on Ethambutol        VTE Risk Mitigation (From admission, onward)         Ordered     enoxaparin injection 40 mg  Daily         04/28/22 0632     Place sequential compression device  Until discontinued         04/28/22 0632     IP VTE HIGH RISK PATIENT  Once         04/28/22 0632                   Robbie Dangelo MD  Department of Hospital  Medicine   [facility] [place] - Emergency Dept

## 2022-04-28 NOTE — HPI
Pt presented to the ED 2/2 chest pain and upper thoracic pain He also had weakness of right arm and leg No numbness or aphasia was reported

## 2022-04-28 NOTE — SUBJECTIVE & OBJECTIVE
(Not in a hospital admission)      Review of patient's allergies indicates:  No Known Allergies    Past Medical History:   Diagnosis Date    Antiphospholipid syndrome 2021    Dysphagia     Hx of colonic polyps     followed by GI. Dr. Pham    Hyperlipidemia LDL goal <100     Overweight(278.02)     Prostate cancer 2011    followed by urology, Dr. Rogers    Type II or unspecified type diabetes mellitus without mention of complication, not stated as uncontrolled     diet controlled     Past Surgical History:   Procedure Laterality Date    BRONCHOSCOPY N/A 10/15/2018    Procedure: BRONCHOSCOPY;  Surgeon: Pratibha Diagnostic Provider;  Location: [facility] OR [address] [place] [address];  Service: Anesthesiology;  Laterality: N/A;    CATARACT EXTRACTION, BILATERAL  2014    PROSTATECTOMY       Family History       Problem Relation (Age of Onset)    Colon cancer Mother    Dementia Brother    Diabetes Mother, Sister, , Brother, Brother, Maternal Aunt    Heart disease Father    Lung cancer Brother    Mental illness Sister    Myasthenia gravis Brother    Other Brother    Parkinsonism Brother    Pulmonary fibrosis Brother          Tobacco Use    Smoking status: Former Smoker     Packs/day: 0.25     Years: 5.00     Pack years: 1.25     Quit date: 10/2/1962     Years since quittin.6    Smokeless tobacco: Never Used    Tobacco comment: quit age 23   Substance and Sexual Activity    Alcohol use: Yes     Comment: beer occasionally    Drug use: No    Sexual activity: Yes     Partners: Female     Review of Systems   Constitutional:  Negative for chills, fatigue and fever.   Eyes:  Negative for photophobia and visual disturbance.   Respiratory:  Negative for cough and shortness of breath.    Cardiovascular:  Negative for chest pain, palpitations and leg swelling.   Gastrointestinal:  Negative for constipation, diarrhea, nausea and vomiting.   Genitourinary:  Negative for difficulty urinating, dysuria, frequency and urgency.    Musculoskeletal:  Positive for myalgias. Negative for back pain, gait problem and neck pain.   Neurological:  Positive for weakness (right upper and lower extremities). Negative for dizziness, seizures, speech difficulty, numbness and headaches.   Psychiatric/Behavioral:  Negative for confusion. The patient is not nervous/anxious.      Objective:     Weight: 72.1 kg (159 lb)  Body mass index is 23.48 kg/m².  Vital Signs (Most Recent):  Temp: 98.7 °F (37.1 °C) (04/28/22 0319)  Pulse: (!) 52 (04/28/22 1530)  Resp: 18 (04/28/22 1530)  BP: (!) 108/56 (04/28/22 1530)  SpO2: 95 % (04/28/22 1530)   Vital Signs (24h Range):  Temp:  [98.4 °F (36.9 °C)-98.7 °F (37.1 °C)] 98.4 °F (36.9 °C)  Pulse:  [45-97] 52  Resp:  [8-25] 18  SpO2:  [92 %-100 %] 95 %  BP: ()/(56-81) 108/56           Neurosurgery Physical Exam  General: well developed, well nourished, no distress.   Head: normocephalic, atraumatic  Neurologic: Alert and oriented. Thought content appropriate.  GCS: Motor: 6/Verbal: 5/Eyes: 4 GCS Total: 15  Mental Status: Awake, Alert, Oriented x 4  Language: No aphasia  Speech: No dysarthria  Cranial nerves: face symmetric, tongue midline, CN II-XII grossly intact.   Eyes: pupils equal, round, reactive to light with accommodation, EOMI.   Pulmonary: normal respirations, no signs of respiratory distress  Skin: Skin is warm, dry and intact.  Sensory: intact to light touch throughout  Motor Strength: left upper and lower extremities are 5/5 in strength with good tone. Right upper extremity is contracted and is 1/5 throughout. RLE is 2/5 in HF and KF, 1/5 in KE, 2/5 in DF/PF/EHL        Significant Labs:  Recent Labs   Lab 04/28/22  0441   *      K 3.8      CO2 24   BUN 9   CREATININE 0.9   CALCIUM 9.0     Recent Labs   Lab 04/28/22  0417 04/28/22 0441   WBC  --  7.81  7.81   HGB  --  13.5*  13.5*   HCT 42 41.5  41.5   PLT  --  244  244     Recent Labs   Lab 04/28/22 0441   INR 3.1*     Microbiology  Results (last 7 days)       Procedure Component Value Units Date/Time    Blood culture [[id number]] Collected: 04/28/22 1201    Order Status: Sent Specimen: Blood from Peripheral, Forearm, Right Updated: 04/28/22 1213    Blood culture [[id number]] Collected: 04/28/22 1201    Order Status: Sent Specimen: Blood from Peripheral, Hand, Left Updated: 04/28/22 1213          All pertinent labs from the last 24 hours have been reviewed.    Significant Diagnostics:    Imaging Results               MRI Cervical Spine W WO Cont (Final result)  Result time 04/28/22 11:30:13   Procedure changed from MRI Cervical Spine With Contrast     Final result by Rhett Conway MD (04/28/22 11:30:13)                   Impression:      1. Abnormal nonspecific posterior epidural fluid, differential diagnosis includes hematoma and abscess.  This in conjunction with juxta-articular posterior soft tissue edema particularly on left and inter spinous process region, superimposed muscle strain posttraumatic change favored in may be responsible for the posterior epidural fluid which could represent hematoma.  No osteomyelitis or septic facet joint disease or discitis or acute fracture noted on CTA neck exam.  2. Prominent degenerative disc spondylosis facet joint arthropathy changes particularly C3 through C6 levels.  3.  This report was flagged in Epic as abnormal.  4. Neuro surgical consultation suggested as well as clinical correlation.    COMMUNICATION  This critical result was discovered/received at 11:15 04/28/2022.  The critical information above was relayed directly by me by telephone to Dr Bay on 04/28/2022 at 1229 hours.      Electronically signed by: Rhett Conway MD  Date:    04/28/2022  Time:    11:30               Narrative:    EXAMINATION:  MRI CERVICAL SPINE W WO CONTRAST    CLINICAL HISTORY:  cervical spine extending from C3-C6 that may be artifact however the appearance is most concerning for the possibility of an  intraspinal epidural process to include the possibility of epidural fluid collection, abscess or hematoma;    TECHNIQUE:  MRI cervical spine without and with intravenous Gadavist 8 cc.    COMPARISON:  CT and MRI imaging of brain, cervical spine dating back 2014.    FINDINGS:  Levoscoliosis cervicothoracic junction with tilting head right.  DJD chronic change anterior C1-C2 with mild hypertrophy transverse ligament and mild indentation adjacent cranial cervical junction.  Marrow space normal.  Degenerative disc spondylosis particularly to C4 through C6.  No fracture subluxation.    Posterior juxta-articular edema noted about left facet joints from C2 through C7 levels as well as between posterior spinous processes, limited similar juxta-articular edema right posterior lateral facet joint structures.  Abnormality right upper lobe suprahilar region, please refer to CTA chest same day report.    Posterior epidural fluid noted to extend from C2-C3-T1 level with isointense signal spinal cord on T1, heterogeneous increased signal on T2 and FLAIR and limited enhancement of margins on post contrasted exam.  Study obscured by motion artifact particularly postcontrast exam.  Overall exam is of satisfactory diagnostic quality.    C2-C3 posterior disc normal, facet joint arthropathy with mild moderate right foramen and no left foramen and mild spinal stenosis.    C3-C4 mild moderate posterior central disc desiccated bulge, mild compression anterior spinal cord, moderate soft tissue spinal stenosis in conjunction with posterior epidural compression of spinal cord mild moderate degree.    C4-C5 mild moderate posterior disc bulge spondylosis, mild compression anterior spinal cord, severe compression posterior spinal cord by epidural fluid particularly on right, facet joint arthropathy with mild moderate foraminal and moderate severe soft tissues spinal stenosis.    C5-C6 mild moderate posterior disc bulge spondylosis, paracentral  prominent left, mild moderate compression anterior spinal cord, moderate compression posterior spinal cord by epidural fluid, facet joint arthropathy with mild moderate foramen stenosis particularly on left, moderate soft tissue spinal stenosis.    C6-C7 mild moderate posterior disc bulge spondylosis, posterior central annular fissure, moderate compression anterior spinal cord, additional mild moderate compression posterior spinal cord by a epidural fluid, facet joint arthropathy, mild moderate foraminal stenosis more prominent on left, moderate soft tissue spinal stenosis.    C7-T1 posterior disc margin normal.  Some hypertrophy posterior spinal ligament, mild moderate compression posterior spinal sac, mild moderate soft tissue spinal stenosis.    Posterior spinal fluid collection extends to level of T1-T2 with mild moderate compression posterior spinal sac and mild moderate spinal stenosis.  Upper dorsal spinal cord and spinal canal otherwise appears intact.    No additional abnormal intra-axial extra-axial enhancement post-contrast exam.  No definite abnormal signal within spinal cord.                                       MRI Brain W WO Contrast (Final result)  Result time 04/28/22 10:49:28   Procedure changed from MRI Brain Without Contrast     Final result by Rhett Conway MD (04/28/22 10:49:28)                   Impression:      New metal opacities within posterior frontal high convexity scalp, perhaps representing post laceration therapy and clinical correlation requested.    MRI brain otherwise stable and unchanged.  No new stroke hemorrhage or subdural fluid.    Numerous areas of supratentorial gliosis.      Electronically signed by: Rhett Conway MD  Date:    04/28/2022  Time:    10:49               Narrative:    EXAMINATION:  MRI BRAIN W WO CONTRAST    CLINICAL HISTORY:  right sided weakness;    TECHNIQUE:  Multiplanar multisequence MR imaging of the brain was performed before and after the  administration of 8 mL Gadavist intravenous contrast.    COMPARISON:  CTA head and neck, CT head same day and MRI CT brain studies dating back 2021 and CT soft tissue neck 2016    FINDINGS:  New metal scalp artifacts posterior frontal high convexity appearing since CT same day 03:50, could represent post laceration therapy and clinical correlation requested.    Central and cortical atrophy remains prominent particularly left posterior frontal lobe cortex.    Numerous scattered foci deep subcortical white matter gliosis particularly frontal and lesser degree parietooccipital more prominent on right, involvement of upper basal ganglia central semi ovale, subinsular cortex again more prominent on right.  Small foci increased signals cerebral peduncle and possibly vague area of linea are subtle gliosis beneath floor 4th ventricle.  Diffusion and gradient echo studies negative.    No new intra-axial or extra-axial enhancement of brain, hemorrhage, subdural fluid, mass or acute stroke.    Nasal septal deviation spur projects left with some mucosal hypertrophy of ethmoid sinuses.  Distal vertebrobasilar distal internal carotid arteries and major branches patent flow void.                                        CTA Head and Neck (xpd) (Final result)  Result time 04/28/22 05:38:06      Final result by Rigoberto De Jesus MD (04/28/22 05:38:06)                   Impression:      The major intracranial and extracranial arterial vascular structures of the head and neck demonstrate no evidence for high-grade stenosis or occlusion, or dissection, and there is no evidence for intracranial aneurysm or AVM.    There is a finding of for a to the cervical spine extending from C3-C6 that may be artifact however the appearance is most concerning for the possibility of an intraspinal epidural process to include the possibility of epidural fluid collection, abscess or hematoma, clinical and historical correlation is needed, MRI examination  of the cervical spine with contrast is recommended.    This report was flagged in Epic as abnormal.    The findings were reported to Dr Joyce in the ER at the time of dictation.      Electronically signed by: Rigoberto De Jesus  Date:    04/28/2022  Time:    05:38               Narrative:    EXAMINATION:  CTA HEAD AND NECK (XPD)    CLINICAL HISTORY:  Neuro deficit, acute, stroke suspected;    TECHNIQUE:  Non contrast low dose axial images were obtained through the head.  CT angiogram was performed from the level of the nae to the top of the head following the IV administration of 100mL of Omnipaque 350.   Sagittal and coronal reconstructions and maximum intensity projection reconstructions were performed. Arterial stenosis percentages are based on NASCET measurement criteria.    COMPARISON:  CT examination of the brain without contrast April 28, 2022, CT examination of the chest without contrast April 28, 2022    FINDINGS:  The vertebral arteries bilaterally demonstrate appropriate opacification extending to the level of the basilar artery which also demonstrates appropriate opacification.  The P1 segment of the posterior cerebral artery bilaterally appears hypoplastic, and there appear to be bilateral posterior communicating arteries, otherwise the PCA bilaterally demonstrate appropriate opacification.    The common, external and internal carotid arteries bilaterally demonstrate appropriate opacification, the internal carotid arteries demonstrate opacification to the level of the [id number].  There is no evidence for high-grade stenosis or occlusion or dissection of the internal carotid arteries.  The anterior cerebral artery and middle cerebral artery bilaterally demonstrate appropriate opacification.  There is suggestion of a thin small anterior communicating artery.    There is no evidence for intracranial aneurysm or AVM.  The intracranial venous structures appear appropriate.    The visualized orbits  appear appropriate.  The paranasal sinuses and mastoid air cells appear appropriate.  The parapharyngeal space bilaterally appears clear, there is no evidence for retropharyngeal abscess.  There is no abnormal thickening of the epiglottis.  The parotid, submandibular and thyroid glands appear appropriate.  The visualized osseous structures demonstrate chronic change.  Findings referable to the lung apices are discussed on the CT examination of the chest of the same date, referral to the chest CT report is recommended.    As best seen on axial series 3 and axial series 2 along the posterior aspect of the spinal canal, and appearing to extend to the right there is subtle suggestion of accentuated attenuation, along the cervical spinal canal extending from approximately C3 to C6.  This is also suggested on sagittal series 602.  This may be artifactual however an intraspinal epidural process to include epidural fluid collection, including the possibility of intraspinal epidural hematoma or abscess cannot be excluded based upon the appearance.  MRI examination of the cervical spine with contrast is recommended for further evaluation.                                        CTA Chest Abdomen Pelvis (Final result)  Result time 04/28/22 05:38:52   Procedure changed from CTA Chest Abdomen Non Coronary     Final result by Rigoberto De Jesus MD (04/28/22 05:38:52)                   Impression:      The thoracic and abdominal aorta demonstrate no evidence for aneurysmal dilatation, aortic dissection or acute aortic injury.    Intrathoracic findings appears stable when compared to the CT examination of the chest of the same date, referral to the chest CT of the same date report is recommended.    Diverticula of the colon are noted in there is a segment of the sigmoid colon demonstrating mild wall thickening, this may relate to concentric muscular hypertrophy of diverticulosis however correlation for mild diverticulitis is needed,  if the patient has not had recent colon screening exam I would recommend follow-up colonoscopy or fluoroscopic barium examination to exclude neoplasm.    Irregular contour of the liver again noted, correlation for chronic hepatic disease is needed.    Cholelithiasis is noted, there is no evidence for pericholecystic inflammatory change.    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    This report was flagged in Epic as abnormal.    The findings were reported to Dr Joyce in the ER at the time of dictation.      Electronically signed by: Rigoberto De Jesus  Date:    04/28/2022  Time:    05:38               Narrative:    EXAMINATION:  CTA CHEST ABDOMEN PELVIS    CLINICAL HISTORY:  Aortic dissection suspected;    TECHNIQUE:  CT examination of the chest abdomen and pelvis was performed via aortic angiographic protocol after the administration of 100 mL Omnipaque 350 intravenous contrast, timing of imaging optimized for evaluation of the aorta.  Axial imaging, sagittal and coronal reconstruction imaging is submitted.  Axial MIP imaging ting of the chest is submitted.    COMPARISON:  CT examination of the chest without contrast April 28, 2022, CTA examination chest abdomen and pelvis October 15, 2018    FINDINGS:  The thoracic and abdominal aorta demonstrate appropriate opacification, there is no evidence for acute aortic leak, aortic dissection, or aneurysmal dilatation and no evidence for periaortic hematoma.  The visualized great vessels appear appropriate.  The pulmonary arterial vasculature appear appropriate.  The celiac artery, hepatic artery, and splenic artery demonstrate appropriate opacification as does the superior mesenteric artery which demonstrates mild narrowing at its origin without high-grade stenosis.  The renal arteries, inferior mesenteric arteries and iliac arterial vasculature appear appropriate.    Intrathoracic findings including cavitary lesion and abnormal areas  of multifocal opacity and nodularity of the right lung appear stable when compared to the prior examination of the same date, the left hemithorax appears stable as well, referral to the earlier chest CT report is recommended.  Mediastinal adenopathy is again noted.  There is no pericardial effusion, there is no hemopericardium, there is no hemothorax.  There is no pneumothorax.    Diminished attenuation of the liver consistent with diffuse fatty infiltrate is noted, irregular contour of the liver again noted, correlation for chronic hepatic disease is needed.  Cholelithiasis is noted, there is no evidence for pericholecystic or peripancreatic inflammatory change.  The stomach appears unremarkable for degree of distention.  There is no evidence for acute process of the spleen or adrenal glands.  There is no hydronephrosis or obstructive uropathy bilaterally.  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  The urinary bladder appears unremarkable for degree of distention.    There is no evidence for small bowel obstructive process.  The appendix is identified, it does not appear inflamed.  Mild prominence of the colon with stool is noted.  Diverticula of the colon are noted.  There is a segment of the sigmoid colon as best seen on axial series 2, images 770 through 809 that demonstrates mild wall thickening, this may relate to concentric muscular hypertrophy of diverticulosis, as significant pericolonic inflammatory change is not appreciated however correlation for signs or symptoms of mild colitis or diverticulitis is needed, if the patient has not had recent colon screening exam I would recommend follow-up colonoscopy or fluoroscopic barium exam to exclude the possibility of neoplasm.  There is no additional evidence for colonic inflammatory or obstructive change.  There is no free intraperitoneal air.  The visualized osseous structures demonstrate chronic  change.                                       CT Chest Without Contrast (Final result)  Result time 04/28/22 04:26:44      Final result by Rigoberto De Jesus MD (04/28/22 04:26:44)                   Impression:      Previously identified right upper lobe cavitary lung lesion again noted, diminishing size, in addition there are multifocal areas of pulmonary opacity, areas of consolidation, airspace disease, and nodularity, involving the right hemithorax, overall demonstrating mild improvement.    Chronic lung changes bilaterally are again noted, and the left hemithorax appears stable.    Mediastinal adenopathy appears stable.    Findings referable to the liver for which correlation for chronic hepatic disease is needed again noted.    Cholelithiasis, there is no evidence for pericholecystic inflammatory change.    Mild pericardial thickening or fluid anteriorly, without large pericardial effusion.      Electronically signed by: Rigoberto De Jesus  Date:    04/28/2022  Time:    04:26               Narrative:    EXAMINATION:  CT CHEST WITHOUT CONTRAST    CLINICAL HISTORY:  back pain, neurologic deficits;    TECHNIQUE:  Low dose axial images, sagittal and coronal reformations were obtained from the thoracic inlet to the lung bases. Contrast was not administered.  The lack of intravenous contrast diminishes the sensitivity of the examination.    COMPARISON:  CT examination of the chest without contrast November 2, 2021    FINDINGS:  The previously identified cavitary lesion of the right upper lobe is again noted, it appears diminished in size when compared to the prior examination, when measured at a similar level to the prior study it measures approximately 1.7 by 3.4 cm on axial imaging compared to 3.3 x 4.4 cm on the prior exam.  There is appearance consistent with bronchial communication to the cavitary lesion, appearing similar to the prior exam.  This is somewhat more conspicuous at this time.  There is continued  appearance of surrounding patchy and confluent and nodular opacities with multifocal airspace opacities and of the right lung, the overall pattern and distribution is similar to the prior study, however the overall appearance is that of improvement.    Chronic appearing lung changes are again noted.  There are mild atelectatic changes noted as well.  The left hemithorax appears stable.  There is no evidence for significant pleural effusion and there is no evidence for pneumothorax.    Mediastinal adenopathy is again noted appearing stable.  There is mild greater thickening or fluid along the pericardium anteriorly.  Otherwise the appearance of the heart and great vessels is stable, evaluation is limited on this noncontrast exam.    Limited imaging of the upper abdomen demonstrates evidence for cholelithiasis without evidence for pericholecystic inflammatory change.  Irregular contour of the liver again noted, correlation for chronic hepatic disease is needed.  The visualized osseous structures appear intact.  Chronic changes are noted.                                       CT Head Without Contrast (Final result)  Result time 04/28/22 04:07:27      Final result by Rigoberto De Jesus MD (04/28/22 04:07:27)                   Impression:      Chronic changes are noted, there is no evidence for superimposed acute intracranial process.      Electronically signed by: Rigoberto De Jesus  Date:    04/28/2022  Time:    04:07               Narrative:    EXAMINATION:  CT HEAD WITHOUT CONTRAST    CLINICAL HISTORY:  Neuro deficit, acute, stroke suspected;    TECHNIQUE:  Low dose axial images were obtained through the head.  Coronal and sagittal reformations were also performed. Contrast was not administered.    COMPARISON:  CT examination of the brain without contrast February 2, 2021    FINDINGS:  The ventricular system, sulcal pattern and parenchymal attenuation characteristics are consistent with chronic change.  Involutional  changes noted, chronic appearing white matter change noted.  There is no evidence for superimposed acute process.  There is no evidence for intracranial mass, mass effect or midline shift, there is no evidence for acute intracranial hemorrhage.  Appropriate CSF spaces are seen at the skull base.    The visualized orbits appear intact.  The mastoid air cells and middle ear cavity bilaterally appear appropriate, as do the paranasal sinuses.  The visualized osseous structures appear intact.

## 2022-04-28 NOTE — ED PROVIDER NOTES
Encounter Date: 4/28/2022       History     Chief Complaint   Patient presents with    Back Pain     Here via [facility] with c/o upper back pain and right arm pain, taking warfarin and azithromycin, Hx PE and mycobacterium    Transfer     [facility] transfer for epidural abscess     81 y.o. male Past Medical History:  11/19/2021: Antiphospholipid syndrome  No date: Dysphagia  No date: Hx of colonic polyps      Comment:  followed by GI. Dr. Pham  No date: Hyperlipidemia LDL goal <100  No date: Overweight(278.02)  september 2011: Prostate cancer      Comment:  followed by urology, Dr. Rogers  No date: Type II or unspecified type diabetes mellitus without   mention of complication, not stated as uncontrolled      Comment:  diet controlled     States that he went to sleep at 9:30pm and was doing well woke up at midnight and his right arm and leg would not work well.  Notes that he was completely off balance as well    Patient is return from CT grandson is in the room I have obtained collateral history his grandson tells me he last saw him normal about 8:30 p.m. m. he called the grandson at 2:00 a.m. complaining of pain in his back.  Grandson noted that during that time he appeared to be somewhat dazed but was walking normally, he states that he went home got ready and came back in order to bring him to the hospital and at that time noted that the patient had complained that his right arm and right leg has stopped working        Review of patient's allergies indicates:  No Known Allergies  Past Medical History:   Diagnosis Date    Antiphospholipid syndrome 11/19/2021    Dysphagia     Hx of colonic polyps     followed by GI. Dr. Pham    Hyperlipidemia LDL goal <100     Overweight(278.02)     Prostate cancer september 2011    followed by urology, Dr. Rogers    Type II or unspecified type diabetes mellitus without mention of complication, not stated as uncontrolled     diet controlled     Past Surgical  History:   Procedure Laterality Date    BRONCHOSCOPY N/A 10/15/2018    Procedure: BRONCHOSCOPY;  Surgeon: Pratibha Diagnostic Provider;  Location: [facility] OR [address] [place] [address];  Service: Anesthesiology;  Laterality: N/A;    CATARACT EXTRACTION, BILATERAL      PROSTATECTOMY       Family History   Problem Relation Age of Onset    Colon cancer Mother     Diabetes Mother     Heart disease Father     Mental illness Sister     Diabetes Sister     Other Brother         polio as a child    Pulmonary fibrosis Brother     Diabetes Unknown     Diabetes Brother     Myasthenia gravis Brother     Parkinsonism Brother     Diabetes Brother     Dementia Brother     Lung cancer Brother         smoker    Diabetes Maternal Aunt      Social History     Tobacco Use    Smoking status: Former Smoker     Packs/day: 0.25     Years: 5.00     Pack years: 1.25     Quit date: 10/2/1962     Years since quittin.6    Smokeless tobacco: Never Used    Tobacco comment: quit age 23   Substance Use Topics    Alcohol use: Yes     Comment: beer occasionally    Drug use: No     Review of Systems   Constitutional: Negative for fever.   HENT: Negative for sore throat.    Respiratory: Negative for shortness of breath.    Cardiovascular: Negative for chest pain.   Gastrointestinal: Negative for nausea.   Genitourinary: Negative for dysuria.   Musculoskeletal: Negative for back pain.   Skin: Negative for rash.   Neurological: Negative for weakness.   Hematological: Does not bruise/bleed easily.   All other systems reviewed and are negative.      Physical Exam     Initial Vitals [22 0319]   BP Pulse Resp Temp SpO2   (!) 143/79 62 18 98.7 °F (37.1 °C) 98 %      MAP       --         Physical Exam    Nursing note and vitals reviewed.  Constitutional: He appears well-developed and well-nourished.   HENT:   Head: Normocephalic and atraumatic.   Eyes: EOM are normal. Pupils are equal, round, and reactive to light.   Cardiovascular: Normal rate and  regular rhythm.   Pulmonary/Chest: Effort normal and breath sounds normal. No respiratory distress.   Abdominal: He exhibits no distension.   Musculoskeletal:         General: No tenderness or edema.     Neurological: He is alert and oriented to person, place, and time.   Skin: Skin is warm and dry.   Psychiatric: He has a normal mood and affect.     3/5 strength right upper extremity right lower extremity  Completely ataxic legs will not hold him up    ED Course   Procedures  Labs Reviewed   CBC W/ AUTO DIFFERENTIAL - Abnormal; Notable for the following components:       Result Value    Hemoglobin 13.5 (*)     RDW 14.8 (*)     All other components within normal limits   COMPREHENSIVE METABOLIC PANEL - Abnormal; Notable for the following components:    Glucose 133 (*)     All other components within normal limits   CBC W/ AUTO DIFFERENTIAL - Abnormal; Notable for the following components:    Hemoglobin 13.5 (*)     RDW 14.8 (*)     All other components within normal limits   LIPID PANEL - Abnormal; Notable for the following components:    Cholesterol 116 (*)     HDL 36 (*)     LDL Cholesterol 62.4 (*)     All other components within normal limits   PROTIME-INR - Abnormal; Notable for the following components:    Prothrombin Time 31.8 (*)     INR 3.1 (*)     All other components within normal limits   HEMOGLOBIN A1C - Abnormal; Notable for the following components:    Hemoglobin A1C 5.7 (*)     All other components within normal limits   ISTAT PROCEDURE - Abnormal; Notable for the following components:    POC Glucose 136 (*)     POC Anion Gap 17 (*)     All other components within normal limits   ISTAT PROCEDURE - Abnormal; Notable for the following components:    POC PTWBT 29.3 (*)     POC PTINR 2.6 (*)     All other components within normal limits   CULTURE, BLOOD   CULTURE, BLOOD   TSH   SARS-COV-2 RNA AMPLIFICATION, QUAL   PROCALCITONIN    Narrative:     Suspect COVID 19   LACTIC ACID, PLASMA    Narrative:      Suspect COVID 19   POCT GLUCOSE, HAND-HELD DEVICE   TYPE & SCREEN   ISTAT CHEM8   POCT PROTIME-INR   POCT PROTIME-INR   ISTAT CHEM8   PREPARE RBC SOFT   PREPARE FRESH FROZEN PLASMA SOFT     EKG Readings: (Independently Interpreted)   Hr 57, sinus viri, nl axis/intervals, no lala/twi, non acute, no stemi.      ECG Results          ECG 12 lead (Final result)  Result time 04/28/22 10:58:32    Final result by Interface, Lab In [facility] (04/28/22 10:58:32)                 Narrative:    Test Reason : I63.9,    Vent. Rate : 057 BPM     Atrial Rate : 057 BPM     P-R Int : 172 ms          QRS Dur : 088 ms      QT Int : 476 ms       P-R-T Axes : 069 077 070 degrees     QTc Int : 463 ms    Sinus bradycardia  Otherwise normal ECG  When compared with ECG of 30-JAN-2021 15:15,  No significant change was found    Confirmed by Lucian Jones MD ([id number]) on 4/28/2022 10:58:27 AM    Referred By: [email or web address]   SELF           Confirmed By:Lucian Jones MD                            Imaging Results           MRI Cervical Spine W WO Cont (Final result)  Result time 04/28/22 11:30:13   Procedure changed from MRI Cervical Spine With Contrast     Final result by Rhett Conway MD (04/28/22 11:30:13)                 Impression:      1. Abnormal nonspecific posterior epidural fluid, differential diagnosis includes hematoma and abscess.  This in conjunction with juxta-articular posterior soft tissue edema particularly on left and inter spinous process region, superimposed muscle strain posttraumatic change favored in may be responsible for the posterior epidural fluid which could represent hematoma.  No osteomyelitis or septic facet joint disease or discitis or acute fracture noted on CTA neck exam.  2. Prominent degenerative disc spondylosis facet joint arthropathy changes particularly C3 through C6 levels.  3.  This report was flagged in Epic as abnormal.  4. Neuro surgical consultation suggested as well as clinical  correlation.    COMMUNICATION  This critical result was discovered/received at 11:15 04/28/2022.  The critical information above was relayed directly by me by telephone to Dr Bay on 04/28/2022 at 1229 hours.      Electronically signed by: Rhett Conway MD  Date:    04/28/2022  Time:    11:30             Narrative:    EXAMINATION:  MRI CERVICAL SPINE W WO CONTRAST    CLINICAL HISTORY:  cervical spine extending from C3-C6 that may be artifact however the appearance is most concerning for the possibility of an intraspinal epidural process to include the possibility of epidural fluid collection, abscess or hematoma;    TECHNIQUE:  MRI cervical spine without and with intravenous Gadavist 8 cc.    COMPARISON:  CT and MRI imaging of brain, cervical spine dating back 2014.    FINDINGS:  Levoscoliosis cervicothoracic junction with tilting head right.  DJD chronic change anterior C1-C2 with mild hypertrophy transverse ligament and mild indentation adjacent cranial cervical junction.  Marrow space normal.  Degenerative disc spondylosis particularly to C4 through C6.  No fracture subluxation.    Posterior juxta-articular edema noted about left facet joints from C2 through C7 levels as well as between posterior spinous processes, limited similar juxta-articular edema right posterior lateral facet joint structures.  Abnormality right upper lobe suprahilar region, please refer to CTA chest same day report.    Posterior epidural fluid noted to extend from C2-C3-T1 level with isointense signal spinal cord on T1, heterogeneous increased signal on T2 and FLAIR and limited enhancement of margins on post contrasted exam.  Study obscured by motion artifact particularly postcontrast exam.  Overall exam is of satisfactory diagnostic quality.    C2-C3 posterior disc normal, facet joint arthropathy with mild moderate right foramen and no left foramen and mild spinal stenosis.    C3-C4 mild moderate posterior central disc desiccated  bulge, mild compression anterior spinal cord, moderate soft tissue spinal stenosis in conjunction with posterior epidural compression of spinal cord mild moderate degree.    C4-C5 mild moderate posterior disc bulge spondylosis, mild compression anterior spinal cord, severe compression posterior spinal cord by epidural fluid particularly on right, facet joint arthropathy with mild moderate foraminal and moderate severe soft tissues spinal stenosis.    C5-C6 mild moderate posterior disc bulge spondylosis, paracentral prominent left, mild moderate compression anterior spinal cord, moderate compression posterior spinal cord by epidural fluid, facet joint arthropathy with mild moderate foramen stenosis particularly on left, moderate soft tissue spinal stenosis.    C6-C7 mild moderate posterior disc bulge spondylosis, posterior central annular fissure, moderate compression anterior spinal cord, additional mild moderate compression posterior spinal cord by a epidural fluid, facet joint arthropathy, mild moderate foraminal stenosis more prominent on left, moderate soft tissue spinal stenosis.    C7-T1 posterior disc margin normal.  Some hypertrophy posterior spinal ligament, mild moderate compression posterior spinal sac, mild moderate soft tissue spinal stenosis.    Posterior spinal fluid collection extends to level of T1-T2 with mild moderate compression posterior spinal sac and mild moderate spinal stenosis.  Upper dorsal spinal cord and spinal canal otherwise appears intact.    No additional abnormal intra-axial extra-axial enhancement post-contrast exam.  No definite abnormal signal within spinal cord.                               MRI Brain W WO Contrast (Final result)  Result time 04/28/22 10:49:28   Procedure changed from MRI Brain Without Contrast     Final result by Rhett Conway MD (04/28/22 10:49:28)                 Impression:      New metal opacities within posterior frontal high convexity scalp,  perhaps representing post laceration therapy and clinical correlation requested.    MRI brain otherwise stable and unchanged.  No new stroke hemorrhage or subdural fluid.    Numerous areas of supratentorial gliosis.      Electronically signed by: Rhett Conway MD  Date:    04/28/2022  Time:    10:49             Narrative:    EXAMINATION:  MRI BRAIN W WO CONTRAST    CLINICAL HISTORY:  right sided weakness;    TECHNIQUE:  Multiplanar multisequence MR imaging of the brain was performed before and after the administration of 8 mL Gadavist intravenous contrast.    COMPARISON:  CTA head and neck, CT head same day and MRI CT brain studies dating back 2021 and CT soft tissue neck 2016    FINDINGS:  New metal scalp artifacts posterior frontal high convexity appearing since CT same day 03:50, could represent post laceration therapy and clinical correlation requested.    Central and cortical atrophy remains prominent particularly left posterior frontal lobe cortex.    Numerous scattered foci deep subcortical white matter gliosis particularly frontal and lesser degree parietooccipital more prominent on right, involvement of upper basal ganglia central semi ovale, subinsular cortex again more prominent on right.  Small foci increased signals cerebral peduncle and possibly vague area of linea are subtle gliosis beneath floor 4th ventricle.  Diffusion and gradient echo studies negative.    No new intra-axial or extra-axial enhancement of brain, hemorrhage, subdural fluid, mass or acute stroke.    Nasal septal deviation spur projects left with some mucosal hypertrophy of ethmoid sinuses.  Distal vertebrobasilar distal internal carotid arteries and major branches patent flow void.                                CTA Head and Neck (xpd) (Final result)  Result time 04/28/22 05:38:06    Final result by Rigoberto De Jesus MD (04/28/22 05:38:06)                 Impression:      The major intracranial and extracranial arterial vascular  structures of the head and neck demonstrate no evidence for high-grade stenosis or occlusion, or dissection, and there is no evidence for intracranial aneurysm or AVM.    There is a finding of for a to the cervical spine extending from C3-C6 that may be artifact however the appearance is most concerning for the possibility of an intraspinal epidural process to include the possibility of epidural fluid collection, abscess or hematoma, clinical and historical correlation is needed, MRI examination of the cervical spine with contrast is recommended.    This report was flagged in Epic as abnormal.    The findings were reported to Dr Joyce in the ER at the time of dictation.      Electronically signed by: Rigoberto De Jesus  Date:    04/28/2022  Time:    05:38             Narrative:    EXAMINATION:  CTA HEAD AND NECK (XPD)    CLINICAL HISTORY:  Neuro deficit, acute, stroke suspected;    TECHNIQUE:  Non contrast low dose axial images were obtained through the head.  CT angiogram was performed from the level of the nae to the top of the head following the IV administration of 100mL of Omnipaque 350.   Sagittal and coronal reconstructions and maximum intensity projection reconstructions were performed. Arterial stenosis percentages are based on NASCET measurement criteria.    COMPARISON:  CT examination of the brain without contrast April 28, 2022, CT examination of the chest without contrast April 28, 2022    FINDINGS:  The vertebral arteries bilaterally demonstrate appropriate opacification extending to the level of the basilar artery which also demonstrates appropriate opacification.  The P1 segment of the posterior cerebral artery bilaterally appears hypoplastic, and there appear to be bilateral posterior communicating arteries, otherwise the PCA bilaterally demonstrate appropriate opacification.    The common, external and internal carotid arteries bilaterally demonstrate appropriate opacification, the internal carotid  arteries demonstrate opacification to the level of the [id number].  There is no evidence for high-grade stenosis or occlusion or dissection of the internal carotid arteries.  The anterior cerebral artery and middle cerebral artery bilaterally demonstrate appropriate opacification.  There is suggestion of a thin small anterior communicating artery.    There is no evidence for intracranial aneurysm or AVM.  The intracranial venous structures appear appropriate.    The visualized orbits appear appropriate.  The paranasal sinuses and mastoid air cells appear appropriate.  The parapharyngeal space bilaterally appears clear, there is no evidence for retropharyngeal abscess.  There is no abnormal thickening of the epiglottis.  The parotid, submandibular and thyroid glands appear appropriate.  The visualized osseous structures demonstrate chronic change.  Findings referable to the lung apices are discussed on the CT examination of the chest of the same date, referral to the chest CT report is recommended.    As best seen on axial series 3 and axial series 2 along the posterior aspect of the spinal canal, and appearing to extend to the right there is subtle suggestion of accentuated attenuation, along the cervical spinal canal extending from approximately C3 to C6.  This is also suggested on sagittal series 602.  This may be artifactual however an intraspinal epidural process to include epidural fluid collection, including the possibility of intraspinal epidural hematoma or abscess cannot be excluded based upon the appearance.  MRI examination of the cervical spine with contrast is recommended for further evaluation.                                CTA Chest Abdomen Pelvis (Final result)  Result time 04/28/22 05:38:52   Procedure changed from CTA Chest Abdomen Non Coronary     Final result by Rigoberto De Jesus MD (04/28/22 05:38:52)                 Impression:      The thoracic and abdominal aorta demonstrate no  evidence for aneurysmal dilatation, aortic dissection or acute aortic injury.    Intrathoracic findings appears stable when compared to the CT examination of the chest of the same date, referral to the chest CT of the same date report is recommended.    Diverticula of the colon are noted in there is a segment of the sigmoid colon demonstrating mild wall thickening, this may relate to concentric muscular hypertrophy of diverticulosis however correlation for mild diverticulitis is needed, if the patient has not had recent colon screening exam I would recommend follow-up colonoscopy or fluoroscopic barium examination to exclude neoplasm.    Irregular contour of the liver again noted, correlation for chronic hepatic disease is needed.    Cholelithiasis is noted, there is no evidence for pericholecystic inflammatory change.    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    This report was flagged in Epic as abnormal.    The findings were reported to Dr Joyce in the ER at the time of dictation.      Electronically signed by: Rigoberto De Jesus  Date:    04/28/2022  Time:    05:38             Narrative:    EXAMINATION:  CTA CHEST ABDOMEN PELVIS    CLINICAL HISTORY:  Aortic dissection suspected;    TECHNIQUE:  CT examination of the chest abdomen and pelvis was performed via aortic angiographic protocol after the administration of 100 mL Omnipaque 350 intravenous contrast, timing of imaging optimized for evaluation of the aorta.  Axial imaging, sagittal and coronal reconstruction imaging is submitted.  Axial MIP imaging ting of the chest is submitted.    COMPARISON:  CT examination of the chest without contrast April 28, 2022, CTA examination chest abdomen and pelvis October 15, 2018    FINDINGS:  The thoracic and abdominal aorta demonstrate appropriate opacification, there is no evidence for acute aortic leak, aortic dissection, or aneurysmal dilatation and no evidence for periaortic  hematoma.  The visualized great vessels appear appropriate.  The pulmonary arterial vasculature appear appropriate.  The celiac artery, hepatic artery, and splenic artery demonstrate appropriate opacification as does the superior mesenteric artery which demonstrates mild narrowing at its origin without high-grade stenosis.  The renal arteries, inferior mesenteric arteries and iliac arterial vasculature appear appropriate.    Intrathoracic findings including cavitary lesion and abnormal areas of multifocal opacity and nodularity of the right lung appear stable when compared to the prior examination of the same date, the left hemithorax appears stable as well, referral to the earlier chest CT report is recommended.  Mediastinal adenopathy is again noted.  There is no pericardial effusion, there is no hemopericardium, there is no hemothorax.  There is no pneumothorax.    Diminished attenuation of the liver consistent with diffuse fatty infiltrate is noted, irregular contour of the liver again noted, correlation for chronic hepatic disease is needed.  Cholelithiasis is noted, there is no evidence for pericholecystic or peripancreatic inflammatory change.  The stomach appears unremarkable for degree of distention.  There is no evidence for acute process of the spleen or adrenal glands.  There is no hydronephrosis or obstructive uropathy bilaterally.  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  The urinary bladder appears unremarkable for degree of distention.    There is no evidence for small bowel obstructive process.  The appendix is identified, it does not appear inflamed.  Mild prominence of the colon with stool is noted.  Diverticula of the colon are noted.  There is a segment of the sigmoid colon as best seen on axial series 2, images 770 through 809 that demonstrates mild wall thickening, this may relate to concentric muscular hypertrophy of  diverticulosis, as significant pericolonic inflammatory change is not appreciated however correlation for signs or symptoms of mild colitis or diverticulitis is needed, if the patient has not had recent colon screening exam I would recommend follow-up colonoscopy or fluoroscopic barium exam to exclude the possibility of neoplasm.  There is no additional evidence for colonic inflammatory or obstructive change.  There is no free intraperitoneal air.  The visualized osseous structures demonstrate chronic change.                               CT Chest Without Contrast (Final result)  Result time 04/28/22 04:26:44    Final result by Rigoberto De Jesus MD (04/28/22 04:26:44)                 Impression:      Previously identified right upper lobe cavitary lung lesion again noted, diminishing size, in addition there are multifocal areas of pulmonary opacity, areas of consolidation, airspace disease, and nodularity, involving the right hemithorax, overall demonstrating mild improvement.    Chronic lung changes bilaterally are again noted, and the left hemithorax appears stable.    Mediastinal adenopathy appears stable.    Findings referable to the liver for which correlation for chronic hepatic disease is needed again noted.    Cholelithiasis, there is no evidence for pericholecystic inflammatory change.    Mild pericardial thickening or fluid anteriorly, without large pericardial effusion.      Electronically signed by: Rigoberto De Jesus  Date:    04/28/2022  Time:    04:26             Narrative:    EXAMINATION:  CT CHEST WITHOUT CONTRAST    CLINICAL HISTORY:  back pain, neurologic deficits;    TECHNIQUE:  Low dose axial images, sagittal and coronal reformations were obtained from the thoracic inlet to the lung bases. Contrast was not administered.  The lack of intravenous contrast diminishes the sensitivity of the examination.    COMPARISON:  CT examination of the chest without contrast November 2, 2021    FINDINGS:  The  previously identified cavitary lesion of the right upper lobe is again noted, it appears diminished in size when compared to the prior examination, when measured at a similar level to the prior study it measures approximately 1.7 by 3.4 cm on axial imaging compared to 3.3 x 4.4 cm on the prior exam.  There is appearance consistent with bronchial communication to the cavitary lesion, appearing similar to the prior exam.  This is somewhat more conspicuous at this time.  There is continued appearance of surrounding patchy and confluent and nodular opacities with multifocal airspace opacities and of the right lung, the overall pattern and distribution is similar to the prior study, however the overall appearance is that of improvement.    Chronic appearing lung changes are again noted.  There are mild atelectatic changes noted as well.  The left hemithorax appears stable.  There is no evidence for significant pleural effusion and there is no evidence for pneumothorax.    Mediastinal adenopathy is again noted appearing stable.  There is mild greater thickening or fluid along the pericardium anteriorly.  Otherwise the appearance of the heart and great vessels is stable, evaluation is limited on this noncontrast exam.    Limited imaging of the upper abdomen demonstrates evidence for cholelithiasis without evidence for pericholecystic inflammatory change.  Irregular contour of the liver again noted, correlation for chronic hepatic disease is needed.  The visualized osseous structures appear intact.  Chronic changes are noted.                               CT Head Without Contrast (Final result)  Result time 04/28/22 04:07:27    Final result by Rigoberto De Jesus MD (04/28/22 04:07:27)                 Impression:      Chronic changes are noted, there is no evidence for superimposed acute intracranial process.      Electronically signed by: Rigoberto De Jesus  Date:    04/28/2022  Time:    04:07             Narrative:     EXAMINATION:  CT HEAD WITHOUT CONTRAST    CLINICAL HISTORY:  Neuro deficit, acute, stroke suspected;    TECHNIQUE:  Low dose axial images were obtained through the head.  Coronal and sagittal reformations were also performed. Contrast was not administered.    COMPARISON:  CT examination of the brain without contrast February 2, 2021    FINDINGS:  The ventricular system, sulcal pattern and parenchymal attenuation characteristics are consistent with chronic change.  Involutional changes noted, chronic appearing white matter change noted.  There is no evidence for superimposed acute process.  There is no evidence for intracranial mass, mass effect or midline shift, there is no evidence for acute intracranial hemorrhage.  Appropriate CSF spaces are seen at the skull base.    The visualized orbits appear intact.  The mastoid air cells and middle ear cavity bilaterally appear appropriate, as do the paranasal sinuses.  The visualized osseous structures appear intact.                                 Medications   pravastatin tablet 10 mg (10 mg Oral Given 4/28/22 0959)   mucus clearing device ( Misc.(Non-Drug; Combo Route) Given by Other 4/28/22 0900)   sodium chloride 3% nebulizer solution 4 mL (4 mLs Nebulization Given 4/28/22 0924)   ethambutoL tablet 800 mg (800 mg Oral Given 4/28/22 0959)   azithromycin tablet 500 mg (500 mg Oral Given 4/28/22 0959)   nebulizer and compressor Marcy 1 Device (1 Device Misc.(Non-Drug; Combo Route) Given by Other 4/28/22 0900)   sodium chloride 0.9% flush 10 mL (has no administration in time range)   labetaloL injection 10 mg (has no administration in time range)   acetaminophen tablet 650 mg (has no administration in time range)   ondansetron disintegrating tablet 8 mg (has no administration in time range)   polyethylene glycol packet 17 g (17 g Oral Given 4/28/22 0959)   human prothrombin complex (PCC) (KCENTRA) 1,802.5 Units in sterile water for injection IVPB (has no administration in  time range)   phytonadione vitamin k (AQUA-MEPHYTON) 10 mg in dextrose 5 % 50 mL IVPB (has no administration in time range)   sodium chloride 0.9% bolus 1,000 mL (0 mLs Intravenous Stopped 4/28/22 0554)   iohexoL (OMNIPAQUE 350) injection 100 mL (100 mLs Intravenous Given 4/28/22 0436)   fentaNYL 50 mcg/mL injection 12.5 mcg (12.5 mcg Intravenous Given 4/28/22 0450)   HYDROmorphone (PF) injection 0.5 mg (0.5 mg Intravenous Given 4/28/22 0524)   sodium chloride 0.9% bolus 500 mL (0 mLs Intravenous Stopped 4/28/22 1350)   loperamide capsule 4 mg (4 mg Oral Given 4/28/22 0754)   HYDROmorphone (PF) injection 1 mg (1 mg Intravenous Given 4/28/22 0754)   ondansetron disintegrating tablet 4 mg (4 mg Oral Given 4/28/22 0753)   gadobutroL (GADAVIST) injection 8 mL (8 mLs Intravenous Given 4/28/22 0955)   piperacillin-tazobactam 4.5 g in dextrose 5 % 100 mL IVPB (ready to mix system) (0 g Intravenous Stopped 4/28/22 1350)   vancomycin 1.5 g in dextrose 5 % 250 mL IVPB (ready to mix) (0 mg/kg × 72.1 kg Intravenous Stopped 4/28/22 1350)                 ED Course as of 04/28/22 1545   Thu Apr 28, 2022   1425 MRI Cervical Spine W WO Cont(!) [HJ]      ED Course User Index  [HJ] Lala Blackwell PA-C      patient with stroke-like symptoms but concerning that he had back pain during the onset of symptoms out of an abundance of caution I have ordered a CT of his chest, the pt is not a tpa candidate as he is on coumadin        It is very concerning the patient had back pain preceding his right arm and leg dysfunction which raises the possibility of a dissection.    I have ordered a CTA of the head and neck and a CTA of the chest and abdomen to hopefully elucidate what is happening    Istat: INR 2.7 , cr 0.8 have called ct to obtain immediate scans. Given INR will defer ASA to medicine team.    Vascular neuro states CT head and cta head/neck are unremarkable. Recommend MRI imaging head/neck.    Pain not relieved by fentanyl, head ct/cta  "normal, have written for 0.5mg dilaudid    After getting dilaudid pt states he can "now move his arm" and feels that his dexterity has improved.     Radiology recommends MRI Cspine with contrast to eval for vague abnormality in his imaging which may correlate with his symptoms.    This is doctor Harinder dictating.  I was called by Dr. Dangelo, he advises me that radiology is reporting an epidural abscess.  This patient will require neuro surgery.  The patient will require transfer.  I will help with the transfer.  I placed a call to neuro surgery at [facility] at 11:35 a.m..  I have ordered blood cultures and antibiotics.    MRI Cervical Spine W WO Cont   Final Result   Abnormal      1. Abnormal nonspecific posterior epidural fluid, differential diagnosis includes hematoma and abscess.  This in conjunction with juxta-articular posterior soft tissue edema particularly on left and inter spinous process region, superimposed muscle strain posttraumatic change favored in may be responsible for the posterior epidural fluid which could represent hematoma.  No osteomyelitis or septic facet joint disease or discitis or acute fracture noted on CTA neck exam.   2. Prominent degenerative disc spondylosis facet joint arthropathy changes particularly C3 through C6 levels.   3.  This report was flagged in Epic as abnormal.   4. Neuro surgical consultation suggested as well as clinical correlation.      COMMUNICATION   This critical result was discovered/received at 11:15 04/28/2022.  The critical information above was relayed directly by me by telephone to Dr Bay on 04/28/2022 at 1229 hours.         Electronically signed by: Rhett Conway MD   Date:    04/28/2022   Time:    11:30      MRI Brain W WO Contrast   Final Result      New metal opacities within posterior frontal high convexity scalp, perhaps representing post laceration therapy and clinical correlation requested.      MRI brain otherwise stable and unchanged.  No " new stroke hemorrhage or subdural fluid.      Numerous areas of supratentorial gliosis.         Electronically signed by: Rhett Conway MD   Date:    04/28/2022   Time:    10:49      CTA Head and Neck (xpd)   Final Result   Abnormal      The major intracranial and extracranial arterial vascular structures of the head and neck demonstrate no evidence for high-grade stenosis or occlusion, or dissection, and there is no evidence for intracranial aneurysm or AVM.      There is a finding of for a to the cervical spine extending from C3-C6 that may be artifact however the appearance is most concerning for the possibility of an intraspinal epidural process to include the possibility of epidural fluid collection, abscess or hematoma, clinical and historical correlation is needed, MRI examination of the cervical spine with contrast is recommended.      This report was flagged in Epic as abnormal.      The findings were reported to Dr Joyce in the ER at the time of dictation.         Electronically signed by: Rigoberto De Jesus   Date:    04/28/2022   Time:    05:38      CTA Chest Abdomen Pelvis   Final Result   Abnormal      The thoracic and abdominal aorta demonstrate no evidence for aneurysmal dilatation, aortic dissection or acute aortic injury.      Intrathoracic findings appears stable when compared to the CT examination of the chest of the same date, referral to the chest CT of the same date report is recommended.      Diverticula of the colon are noted in there is a segment of the sigmoid colon demonstrating mild wall thickening, this may relate to concentric muscular hypertrophy of diverticulosis however correlation for mild diverticulitis is needed, if the patient has not had recent colon screening exam I would recommend follow-up colonoscopy or fluoroscopic barium examination to exclude neoplasm.      Irregular contour of the liver again noted, correlation for chronic hepatic disease is needed.      Cholelithiasis  is noted, there is no evidence for pericholecystic inflammatory change.      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      This report was flagged in Epic as abnormal.      The findings were reported to Dr Joyce in the ER at the time of dictation.         Electronically signed by: Rigoberto De Jesus   Date:    04/28/2022   Time:    05:38      CT Chest Without Contrast   Final Result      Previously identified right upper lobe cavitary lung lesion again noted, diminishing size, in addition there are multifocal areas of pulmonary opacity, areas of consolidation, airspace disease, and nodularity, involving the right hemithorax, overall demonstrating mild improvement.      Chronic lung changes bilaterally are again noted, and the left hemithorax appears stable.      Mediastinal adenopathy appears stable.      Findings referable to the liver for which correlation for chronic hepatic disease is needed again noted.      Cholelithiasis, there is no evidence for pericholecystic inflammatory change.      Mild pericardial thickening or fluid anteriorly, without large pericardial effusion.         Electronically signed by: Rigoberto De Jesus   Date:    04/28/2022   Time:    04:26      CT Head Without Contrast   Final Result      Chronic changes are noted, there is no evidence for superimposed acute intracranial process.         Electronically signed by: Rigoberto De Jesus   Date:    04/28/2022   Time:    04:07        Labs Reviewed   CBC W/ AUTO DIFFERENTIAL - Abnormal; Notable for the following components:       Result Value    Hemoglobin 13.5 (*)     RDW 14.8 (*)     All other components within normal limits   COMPREHENSIVE METABOLIC PANEL - Abnormal; Notable for the following components:    Glucose 133 (*)     All other components within normal limits   CBC W/ AUTO DIFFERENTIAL - Abnormal; Notable for the following components:    Hemoglobin 13.5 (*)     RDW 14.8 (*)     All other components within  normal limits   LIPID PANEL - Abnormal; Notable for the following components:    Cholesterol 116 (*)     HDL 36 (*)     LDL Cholesterol 62.4 (*)     All other components within normal limits   PROTIME-INR - Abnormal; Notable for the following components:    Prothrombin Time 31.8 (*)     INR 3.1 (*)     All other components within normal limits   HEMOGLOBIN A1C - Abnormal; Notable for the following components:    Hemoglobin A1C 5.7 (*)     All other components within normal limits   ISTAT PROCEDURE - Abnormal; Notable for the following components:    POC Glucose 136 (*)     POC Anion Gap 17 (*)     All other components within normal limits   ISTAT PROCEDURE - Abnormal; Notable for the following components:    POC PTWBT 29.3 (*)     POC PTINR 2.6 (*)     All other components within normal limits   CULTURE, BLOOD   CULTURE, BLOOD   TSH   SARS-COV-2 RNA AMPLIFICATION, QUAL   PROCALCITONIN    Narrative:     Suspect COVID 19   LACTIC ACID, PLASMA    Narrative:     Suspect COVID 19   POCT GLUCOSE, HAND-HELD DEVICE   TYPE & SCREEN   ISTAT CHEM8   POCT PROTIME-INR   POCT PROTIME-INR   ISTAT CHEM8   PREPARE RBC SOFT   PREPARE FRESH FROZEN PLASMA SOFT       Clinical Impression:   Final diagnoses:  [M79.603] Arm pain  [M54.9] Back pain  [R29.90] Stroke-like symptoms (Primary)  [R93.7] Abnormal CT scan, cervical spine  [G06.2] Epidural abscess          ED Disposition Condition    Transfer to Another Facility Stable              Baldo Joyce MD  04/28/22 9512       Neville Pizano MD  04/28/22 1152       Neville Pizano MD  04/28/22 7805

## 2022-04-28 NOTE — HPI
"Mr. Gunter is a 81 year-old male with antiphospholipid syndrome (diagnosed 11/2021, on warfarin), PE, HLD, MAC (diagnosed in 2018, on chronic ethambutol and azithromycin), previous prostate ca (diagnosed in 2011, s/p prostatectomy) who presented to the hospital on 04/28 with R-sided weakness. Stroke workup was negative, but imaging was consistent with a compressive epidural hematoma ni the cervical spine C3-C6. He underwent spinal decompression and fluid evacuation with NSGY on 04/28. Since admitted, the Hematology/Oncology service was consulted- recommended not resuming therapeutic anticoagulation for APLS given current bleeding event and starting ppx anticoagulation when deemed safe by nsgy. ID was also consulted for recommendations regarding whether the epidural fluid collection could be an infectious collection- recommended starting dapto until path returned. Hospital course has been complicated by shock with intermittent pressor requirements (off since 05/01) and waxing/waning encephalopathy. He was noted to have acute changes in mental status around 05/01 which prompted a workup- EEG was obtained without epileptiform changes but with evidence of diffuse background slowing. Medicine consulted for "medical management; APS previously on coumadin; post op delirium".  "

## 2022-04-28 NOTE — SUBJECTIVE & OBJECTIVE
Past Medical History:   Diagnosis Date    Antiphospholipid syndrome 11/19/2021    Dysphagia     Hx of colonic polyps     followed by GI. Dr. Pham    Hyperlipidemia LDL goal <100     Overweight(278.02)     Prostate cancer september 2011    followed by urology, Dr. Rogers    Type II or unspecified type diabetes mellitus without mention of complication, not stated as uncontrolled     diet controlled       Past Surgical History:   Procedure Laterality Date    BRONCHOSCOPY N/A 10/15/2018    Procedure: BRONCHOSCOPY;  Surgeon: [facility] Diagnostic Provider;  Location: [facility] OR [address] [place] [address];  Service: Anesthesiology;  Laterality: N/A;    CATARACT EXTRACTION, BILATERAL  2014    PROSTATECTOMY         Review of patient's allergies indicates:  No Known Allergies    No current facility-administered medications on file prior to encounter.     Current Outpatient Medications on File Prior to Encounter   Medication Sig    albuterol (PROVENTIL/VENTOLIN HFA) 90 mcg/actuation inhaler INHALE 2 PUFFS BY MOUTH EVERY 6 HOURS AS NEEDED FOR WHEEZING    ascorbic acid, vitamin C, (VITAMIN C) 1000 MG tablet Take 1,000 mg by mouth once daily.    aspirin (ECOTRIN) 81 MG EC tablet Take 81 mg by mouth.    b complex vitamins capsule Take 1 capsule by mouth once daily.    ethambutoL (MYAMBUTOL) 400 MG Tab Take 2 tablets (800 mg total) by mouth once daily.    mucus clearing device (AEROBIKA OSCILLATING PEP SYSTM) by Misc.(Non-Drug; Combo Route) route 2 (two) times a day.    nebulizer and compressor (OMBRA COMPRESSOR SYSTEM) Marcy use to administer nebulized medication as directed    omega 3-dha-epa-fish oil 1,000 (120-180) mg Cap Take 1 capsule by mouth once daily.    omega-3 fatty acids 1,000 mg Cap Take 2 g by mouth.    omeprazole 20 mg TbEC 1/2 tablet daily in am as needed.    pravastatin (PRAVACHOL) 10 MG tablet Take 1 tablet (10 mg total) by mouth once daily.    promethazine-dextromethorphan (PROMETHAZINE-DM) 6.25-15 mg/5 mL Syrp Take 10-15ml by mouth  every 8 hours as needed for coughing    sodium chloride 3% 3 % nebulizer solution USE 4 ML VIAL IN NEBULIZER  TWICE DAILY    warfarin (COUMADIN) 3 MG tablet Take 1.5 tablets (4.5 mg total) by mouth Daily. As directed by coumadin clinic     Family History       Problem Relation (Age of Onset)    Colon cancer Mother    Dementia Brother    Diabetes Mother, Sister, , Brother, Brother, Maternal Aunt    Heart disease Father    Lung cancer Brother    Mental illness Sister    Myasthenia gravis Brother    Other Brother    Parkinsonism Brother    Pulmonary fibrosis Brother          Tobacco Use    Smoking status: Former Smoker     Packs/day: 0.25     Years: 5.00     Pack years: 1.25     Quit date: 10/2/1962     Years since quittin.6    Smokeless tobacco: Never Used    Tobacco comment: quit age 23   Substance and Sexual Activity    Alcohol use: Yes     Comment: beer occasionally    Drug use: No    Sexual activity: Yes     Partners: Female     Review of Systems   Constitutional: Negative.    HENT:  Positive for hearing loss.         Hard of hearing, new recent hearing aids.   Eyes: Negative.    Respiratory: Negative.     Cardiovascular: Negative.    Gastrointestinal: Negative.    Endocrine: Negative.    Genitourinary: Negative.    Musculoskeletal: Negative.    Skin: Negative.    Allergic/Immunologic: Negative.    Neurological:  Positive for weakness.        Complain of right arm weakness and difficulty walking this morning, right leg weaker.  Started 2 a.m. today.   Hematological: Negative.    Psychiatric/Behavioral: Negative.     Objective:     Vital Signs (Most Recent):  Temp: 98.7 °F (37.1 °C) (22 0319)  Pulse: (!) 49 (22 0702)  Resp: 18 (22 0754)  BP: (!) 117/56 (22 0702)  SpO2: (!) 94 % (22 0702)   Vital Signs (24h Range):  Temp:  [98.7 °F (37.1 °C)] 98.7 °F (37.1 °C)  Pulse:  [49-64] 49  Resp:  [8-25] 18  SpO2:  [94 %-98 %] 94 %  BP: (112-167)/(56-81) 117/56     Weight: 72.1 kg (159  lb)  Body mass index is 23.48 kg/m².    Physical Exam  Vitals reviewed.   Constitutional:       Appearance: Normal appearance. He is normal weight.   HENT:      Head: Normocephalic and atraumatic.      Nose: Nose normal.      Mouth/Throat:      Mouth: Mucous membranes are moist.      Pharynx: Oropharynx is clear.   Eyes:      Extraocular Movements: Extraocular movements intact and EOM normal.      Conjunctiva/sclera: Conjunctivae normal.      Pupils: Pupils are equal, round, and reactive to light.   Cardiovascular:      Rate and Rhythm: Normal rate and regular rhythm.      Pulses: Normal pulses.      Heart sounds: Normal heart sounds.   Pulmonary:      Effort: Pulmonary effort is normal.      Breath sounds: Normal breath sounds.   Abdominal:      General: Abdomen is flat. Bowel sounds are normal.      Palpations: Abdomen is soft.   Musculoskeletal:         General: Normal range of motion.      Cervical back: Normal range of motion. Tenderness present.   Skin:     General: Skin is warm.   Neurological:      Mental Status: He is alert and oriented to person, place, and time.      Motor: Weakness present.      Comments: Mild right arm and right leg weakness   Psychiatric:         Mood and Affect: Mood normal.         Behavior: Behavior normal.         CRANIAL NERVES     CN I  normal olfaction    CN II   Visual fields full to confrontation.   Visual acuity: normal    CN III, IV, VI   Pupils are equal, round, and reactive to light.  Extraocular motions are normal.     CN V   Facial sensation intact.   Right corneal reflex: normal    CN VII   Facial expression full, symmetric.     CN VIII   CN VIII normal.     CN IX, X   CN IX normal.     CN XI   CN XI normal.     CN XII   CN XII normal.      Significant Labs: All pertinent labs within the past 24 hours have been reviewed.  CBC:   Recent Labs   Lab 04/28/22  0417 04/28/22  0441   WBC  --  7.81  7.81   HGB  --  13.5*  13.5*   HCT 42 41.5  41.5   PLT  --  244  244      CMP:   Recent Labs   Lab 04/28/22  0441      K 3.8      CO2 24   *   BUN 9   CREATININE 0.9   CALCIUM 9.0   PROT 7.3   ALBUMIN 3.8   BILITOT 0.4   ALKPHOS 73   AST 28   ALT 28   ANIONGAP 10   EGFRNONAA >60     Lipid Panel:   Recent Labs   Lab 04/28/22  0441   CHOL 116*   HDL 36*   LDLCALC 62.4*   TRIG 88   CHOLHDL 31.0     Troponin: No results for input(s): TROPONINI in the last 48 hours.  Urine Studies: No results for input(s): COLORU, APPEARANCEUA, PHUR, SPECGRAV, PROTEINUA, GLUCUA, KETONESU, BILIRUBINUA, OCCULTUA, NITRITE, UROBILINOGEN, LEUKOCYTESUR, RBCUA, WBCUA, BACTERIA, SQUAMEPITHEL, HYALINECASTS in the last 48 hours.    Invalid input(s): WRIGHTSUR    Significant Imaging:       Narrative & Impression  EXAMINATION:  CTA CHEST ABDOMEN PELVIS     CLINICAL HISTORY:  Aortic dissection suspected;     TECHNIQUE:  CT examination of the chest abdomen and pelvis was performed via aortic angiographic protocol after the administration of 100 mL Omnipaque 350 intravenous contrast, timing of imaging optimized for evaluation of the aorta.  Axial imaging, sagittal and coronal reconstruction imaging is submitted.  Axial MIP imaging ting of the chest is submitted.     COMPARISON:  CT examination of the chest without contrast April 28, 2022, CTA examination chest abdomen and pelvis October 15, 2018     FINDINGS:  The thoracic and abdominal aorta demonstrate appropriate opacification, there is no evidence for acute aortic leak, aortic dissection, or aneurysmal dilatation and no evidence for periaortic hematoma.  The visualized great vessels appear appropriate.  The pulmonary arterial vasculature appear appropriate.  The celiac artery, hepatic artery, and splenic artery demonstrate appropriate opacification as does the superior mesenteric artery which demonstrates mild narrowing at its origin without high-grade stenosis.  The renal arteries, inferior mesenteric arteries and iliac arterial vasculature appear  appropriate.     Intrathoracic findings including cavitary lesion and abnormal areas of multifocal opacity and nodularity of the right lung appear stable when compared to the prior examination of the same date, the left hemithorax appears stable as well, referral to the earlier chest CT report is recommended.  Mediastinal adenopathy is again noted.  There is no pericardial effusion, there is no hemopericardium, there is no hemothorax.  There is no pneumothorax.     Diminished attenuation of the liver consistent with diffuse fatty infiltrate is noted, irregular contour of the liver again noted, correlation for chronic hepatic disease is needed.  Cholelithiasis is noted, there is no evidence for pericholecystic or peripancreatic inflammatory change.  The stomach appears unremarkable for degree of distention.  There is no evidence for acute process of the spleen or adrenal glands.  There is no hydronephrosis or obstructive uropathy bilaterally.  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  The urinary bladder appears unremarkable for degree of distention.     There is no evidence for small bowel obstructive process.  The appendix is identified, it does not appear inflamed.  Mild prominence of the colon with stool is noted.  Diverticula of the colon are noted.  There is a segment of the sigmoid colon as best seen on axial series 2, images 770 through 809 that demonstrates mild wall thickening, this may relate to concentric muscular hypertrophy of diverticulosis, as significant pericolonic inflammatory change is not appreciated however correlation for signs or symptoms of mild colitis or diverticulitis is needed, if the patient has not had recent colon screening exam I would recommend follow-up colonoscopy or fluoroscopic barium exam to exclude the possibility of neoplasm.  There is no additional evidence for colonic inflammatory or obstructive change.  There  is no free intraperitoneal air.  The visualized osseous structures demonstrate chronic change.     Impression:     The thoracic and abdominal aorta demonstrate no evidence for aneurysmal dilatation, aortic dissection or acute aortic injury.     Intrathoracic findings appears stable when compared to the CT examination of the chest of the same date, referral to the chest CT of the same date report is recommended.     Diverticula of the colon are noted in there is a segment of the sigmoid colon demonstrating mild wall thickening, this may relate to concentric muscular hypertrophy of diverticulosis however correlation for mild diverticulitis is needed, if the patient has not had recent colon screening exam I would recommend follow-up colonoscopy or fluoroscopic barium examination to exclude neoplasm.     Irregular contour of the liver again noted, correlation for chronic hepatic disease is needed.     Cholelithiasis is noted, there is no evidence for pericholecystic inflammatory change.     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     This report was flagged in Epic as abnormal.     The findings were reported to Dr Joyce in the ER at the time of dictation.        Electronically signed by: Rigoberto De Jesus  Date:                                            04/28/2022  Time:                                           05:38      Narrative & Impression  EXAMINATION:  CTA HEAD AND NECK (XPD)     CLINICAL HISTORY:  Neuro deficit, acute, stroke suspected;     TECHNIQUE:  Non contrast low dose axial images were obtained through the head.  CT angiogram was performed from the level of the nae to the top of the head following the IV administration of 100mL of Omnipaque 350.   Sagittal and coronal reconstructions and maximum intensity projection reconstructions were performed. Arterial stenosis percentages are based on NASCET measurement criteria.     COMPARISON:  CT examination of the brain  without contrast April 28, 2022, CT examination of the chest without contrast April 28, 2022     FINDINGS:  The vertebral arteries bilaterally demonstrate appropriate opacification extending to the level of the basilar artery which also demonstrates appropriate opacification.  The P1 segment of the posterior cerebral artery bilaterally appears hypoplastic, and there appear to be bilateral posterior communicating arteries, otherwise the PCA bilaterally demonstrate appropriate opacification.     The common, external and internal carotid arteries bilaterally demonstrate appropriate opacification, the internal carotid arteries demonstrate opacification to the level of the [id number].  There is no evidence for high-grade stenosis or occlusion or dissection of the internal carotid arteries.  The anterior cerebral artery and middle cerebral artery bilaterally demonstrate appropriate opacification.  There is suggestion of a thin small anterior communicating artery.     There is no evidence for intracranial aneurysm or AVM.  The intracranial venous structures appear appropriate.     The visualized orbits appear appropriate.  The paranasal sinuses and mastoid air cells appear appropriate.  The parapharyngeal space bilaterally appears clear, there is no evidence for retropharyngeal abscess.  There is no abnormal thickening of the epiglottis.  The parotid, submandibular and thyroid glands appear appropriate.  The visualized osseous structures demonstrate chronic change.  Findings referable to the lung apices are discussed on the CT examination of the chest of the same date, referral to the chest CT report is recommended.     As best seen on axial series 3 and axial series 2 along the posterior aspect of the spinal canal, and appearing to extend to the right there is subtle suggestion of accentuated attenuation, along the cervical spinal canal extending from approximately C3 to C6.  This is also suggested on sagittal  series 602.  This may be artifactual however an intraspinal epidural process to include epidural fluid collection, including the possibility of intraspinal epidural hematoma or abscess cannot be excluded based upon the appearance.  MRI examination of the cervical spine with contrast is recommended for further evaluation.     Impression:     The major intracranial and extracranial arterial vascular structures of the head and neck demonstrate no evidence for high-grade stenosis or occlusion, or dissection, and there is no evidence for intracranial aneurysm or AVM.     There is a finding of for a to the cervical spine extending from C3-C6 that may be artifact however the appearance is most concerning for the possibility of an intraspinal epidural process to include the possibility of epidural fluid collection, abscess or hematoma, clinical and historical correlation is needed, MRI examination of the cervical spine with contrast is recommended.     This report was flagged in Epic as abnormal.     The findings were reported to Dr Joyce in the ER at the time of dictation.        Electronically signed by: Rigoberto De Jesus  Date:                                            04/28/2022  Time:                                           05:38             Exam Ended: 04/28/22 04:43             EXAMINATION:  CT CHEST WITHOUT CONTRAST     CLINICAL HISTORY:  back pain, neurologic deficits;     TECHNIQUE:  Low dose axial images, sagittal and coronal reformations were obtained from the thoracic inlet to the lung bases. Contrast was not administered.  The lack of intravenous contrast diminishes the sensitivity of the examination.     COMPARISON:  CT examination of the chest without contrast November 2, 2021     FINDINGS:  The previously identified cavitary lesion of the right upper lobe is again noted, it appears diminished in size when compared to the prior examination, when measured at a similar level to the prior study it measures  approximately 1.7 by 3.4 cm on axial imaging compared to 3.3 x 4.4 cm on the prior exam.  There is appearance consistent with bronchial communication to the cavitary lesion, appearing similar to the prior exam.  This is somewhat more conspicuous at this time.  There is continued appearance of surrounding patchy and confluent and nodular opacities with multifocal airspace opacities and of the right lung, the overall pattern and distribution is similar to the prior study, however the overall appearance is that of improvement.     Chronic appearing lung changes are again noted.  There are mild atelectatic changes noted as well.  The left hemithorax appears stable.  There is no evidence for significant pleural effusion and there is no evidence for pneumothorax.     Mediastinal adenopathy is again noted appearing stable.  There is mild greater thickening or fluid along the pericardium anteriorly.  Otherwise the appearance of the heart and great vessels is stable, evaluation is limited on this noncontrast exam.     Limited imaging of the upper abdomen demonstrates evidence for cholelithiasis without evidence for pericholecystic inflammatory change.  Irregular contour of the liver again noted, correlation for chronic hepatic disease is needed.  The visualized osseous structures appear intact.  Chronic changes are noted.     Impression:     Previously identified right upper lobe cavitary lung lesion again noted, diminishing size, in addition there are multifocal areas of pulmonary opacity, areas of consolidation, airspace disease, and nodularity, involving the right hemithorax, overall demonstrating mild improvement.     Chronic lung changes bilaterally are again noted, and the left hemithorax appears stable.     Mediastinal adenopathy appears stable.     Findings referable to the liver for which correlation for chronic hepatic disease is needed again noted.     Cholelithiasis, there is no evidence for pericholecystic  inflammatory change.     Mild pericardial thickening or fluid anteriorly, without large pericardial effusion.        Electronically signed by: Rigoberto De Jesus  Date:                                            04/28/2022  Time:                                           04:26             Exam Ended: 04/28/22 04:02               EXAMINATION:  CT HEAD WITHOUT CONTRAST     CLINICAL HISTORY:  Neuro deficit, acute, stroke suspected;     TECHNIQUE:  Low dose axial images were obtained through the head.  Coronal and sagittal reformations were also performed. Contrast was not administered.     COMPARISON:  CT examination of the brain without contrast February 2, 2021     FINDINGS:  The ventricular system, sulcal pattern and parenchymal attenuation characteristics are consistent with chronic change.  Involutional changes noted, chronic appearing white matter change noted.  There is no evidence for superimposed acute process.  There is no evidence for intracranial mass, mass effect or midline shift, there is no evidence for acute intracranial hemorrhage.  Appropriate CSF spaces are seen at the skull base.     The visualized orbits appear intact.  The mastoid air cells and middle ear cavity bilaterally appear appropriate, as do the paranasal sinuses.  The visualized osseous structures appear intact.     Impression:     Chronic changes are noted, there is no evidence for superimposed acute intracranial process.        Electronically signed by: Rigoberto De Jesus  Date:                                            04/28/2022  Time:                                           04:07

## 2022-04-28 NOTE — ASSESSMENT & PLAN NOTE
Patient has acute metabolic encephalopathy that is secondary to Cerebral ischemia. Patient's current mental status is Oriented person. Patient's baseline mental status is. awake and alert; oriented to person, place, and time Evaluation and for underlying cause(s) is underway and inclusive of Neuro-Imaging (MRI/CT). Will monitor neuro checks carefully, avoid narcotics and benzos that will exacerbate agitation, and use PRN medications for controls of behavior for self harm.   -CTA head and neck, no aneurysm or stenosis  -CTA chest/abdomen/pelvis, no PE, no dissection, questionable changes C3-C6 with need to rule out epidural process.  -MRI brain and C-spine ordered  -neurology consultation  -patient on Coumadin, INR 3.1  -added aspirin, statin  -CVA/TIA order set  -echo ordered  -neuro checks

## 2022-04-28 NOTE — PLAN OF CARE
04/28/22 0855   Discharge Planning   Assessment Type Discharge Planning Brief Assessment   Resource/Environmental Concerns none   Support Systems Spouse/significant other   Equipment Currently Used at Home none   Current Living Arrangements home/apartment/condo   Patient/Family Anticipates Transition to home with family   Patient/Family Anticipated Services at Transition education services   DME Needed Upon Discharge  none   Discharge Plan A Home with family  (with already scheduled follow up)     [facility] - [place], [place] - [address]  [address]  [place] [address]  Phone: [phone] Fax: [phone]'

## 2022-04-28 NOTE — ED NOTES
Per Neurosurgery MD, surgery team will come down to bring pt to OR once anesthesia obtains consents

## 2022-04-28 NOTE — ASSESSMENT & PLAN NOTE
-CT chest without contrast shows improvement of previous cavitary lesion and infiltrates  -patient on Ethambutol

## 2022-04-28 NOTE — HPI
Patient is a 80 y/o male with antiphospholipid syndrome on chronic coumadin, MAC pneumonia, HLD, and left PE diagnosed January 2021 who was transferred from  for compressive epidural hematoma in the cervical spine. Patient reports that he woke up in the middle of the night and could no move the right side of his body. He went to  ED and stroke code initiated. Patient was not given tPA but given ASA. CTA was negative for acute CVA but did show compressive fluid collective from C3-C6. MRI cervical spine confirmed. [place] [facility] [place]. He reports he cannot move his right arm out of the contracted position, he also cannot lift his right leg off the bed. He reports the right arm and leg are painful. No complaints on left side of body. Denies b/b dysfunction.

## 2022-04-28 NOTE — ASSESSMENT & PLAN NOTE
-patient seen by vascular Surgical input appreciated.  No recommendation for intervention  -CTA chest/abdomen/pelvis showed no dissection, no PE, questionable changes C3-C6 epidural process.    -MRI brain and C-spine ordered

## 2022-04-28 NOTE — ANESTHESIA PROCEDURE NOTES
Intubation    Date/Time: 4/28/2022 4:41 PM  Performed by: Tracey Martinez CRNA  Authorized by: Tracey Martinez CRNA     Intubation:     Induction:  Intravenous    Intubated:  Postinduction    Mask Ventilation:  N/a    Attempts:  1    Attempted By:  CRNA    Method of Intubation:  Video laryngoscopy    Blade:  Gregorio 4    Laryngeal View Grade: Grade I - full view of cords      Difficult Airway Encountered?: No      Complications:  None    Airway Device:  Oral endotracheal tube    Airway Device Size:  7.5    Style/Cuff Inflation:  Cuffed (inflated to minimal occlusive pressure)    Tube secured:  23    Secured at:  The lips    Placement Verified By:  Capnometry    Complicating Factors:  None    Findings Post-Intubation:  BS equal bilateral and atraumatic/condition of teeth unchanged

## 2022-04-28 NOTE — CONSULTS
[facility] - Emergency Dept  Neurosurgery  Consult Note    Inpatient consult to Neurosurgery  Consult performed by: Nicole Colbert PA-C  Consult ordered by: Lala Blackwell PA-C        Subjective:     Chief Complaint/Reason for Admission: cervical epidural hematoma    History of Present Illness: Patient is a 80 y/o male with antiphospholipid syndrome on chronic coumadin, MAC pneumonia, HLD, and left PE diagnosed January 2021 who was transferred from  for compressive epidural hematoma in the cervical spine. Patient reports that he woke up in the middle of the night and could no move the right side of his body. He went to  ED and stroke code initiated. Patient was not given tPA but given ASA. CTA was negative for acute CVA but did show compressive fluid collective from C3-C6. MRI cervical spine confirmed. Patient was transferred to [facility] ED for neurosurgical evaluation. He reports he cannot move his right arm out of the contracted position, he also cannot lift his right leg off the bed. He reports the right arm and leg are painful. No complaints on left side of body. Denies b/b dysfunction.       (Not in a hospital admission)      Review of patient's allergies indicates:  No Known Allergies    Past Medical History:   Diagnosis Date    Antiphospholipid syndrome 11/19/2021    Dysphagia     Hx of colonic polyps     followed by GI. Dr. Pham    Hyperlipidemia LDL goal <100     Overweight(278.02)     Prostate cancer september 2011    followed by urology, Dr. Rogers    Type II or unspecified type diabetes mellitus without mention of complication, not stated as uncontrolled     diet controlled     Past Surgical History:   Procedure Laterality Date    BRONCHOSCOPY N/A 10/15/2018    Procedure: BRONCHOSCOPY;  Surgeon: Pratibha Diagnostic Provider;  Location: [facility] OR [address] [place] [address];  Service: Anesthesiology;  Laterality: N/A;    CATARACT EXTRACTION, BILATERAL  2014    PROSTATECTOMY       Family History       Problem Relation  (Age of Onset)    Colon cancer Mother    Dementia Brother    Diabetes Mother, Sister, , Brother, Brother, Maternal Aunt    Heart disease Father    Lung cancer Brother    Mental illness Sister    Myasthenia gravis Brother    Other Brother    Parkinsonism Brother    Pulmonary fibrosis Brother          Tobacco Use    Smoking status: Former Smoker     Packs/day: 0.25     Years: 5.00     Pack years: 1.25     Quit date: 10/2/1962     Years since quittin.6    Smokeless tobacco: Never Used    Tobacco comment: quit age 23   Substance and Sexual Activity    Alcohol use: Yes     Comment: beer occasionally    Drug use: No    Sexual activity: Yes     Partners: Female     Review of Systems   Constitutional:  Negative for chills, fatigue and fever.   Eyes:  Negative for photophobia and visual disturbance.   Respiratory:  Negative for cough and shortness of breath.    Cardiovascular:  Negative for chest pain, palpitations and leg swelling.   Gastrointestinal:  Negative for constipation, diarrhea, nausea and vomiting.   Genitourinary:  Negative for difficulty urinating, dysuria, frequency and urgency.   Musculoskeletal:  Positive for myalgias. Negative for back pain, gait problem and neck pain.   Neurological:  Positive for weakness (right upper and lower extremities). Negative for dizziness, seizures, speech difficulty, numbness and headaches.   Psychiatric/Behavioral:  Negative for confusion. The patient is not nervous/anxious.      Objective:     Weight: 72.1 kg (159 lb)  Body mass index is 23.48 kg/m².  Vital Signs (Most Recent):  Temp: 98.7 °F (37.1 °C) (22 0319)  Pulse: (!) 52 (22 1530)  Resp: 18 (22 1530)  BP: (!) 108/56 (22 1530)  SpO2: 95 % (22 1530)   Vital Signs (24h Range):  Temp:  [98.4 °F (36.9 °C)-98.7 °F (37.1 °C)] 98.4 °F (36.9 °C)  Pulse:  [45-97] 52  Resp:  [8-25] 18  SpO2:  [92 %-100 %] 95 %  BP: ()/(56-81) 108/56           Neurosurgery Physical Exam  General: well  developed, well nourished, no distress.   Head: normocephalic, atraumatic  Neurologic: Alert and oriented. Thought content appropriate.  GCS: Motor: 6/Verbal: 5/Eyes: 4 GCS Total: 15  Mental Status: Awake, Alert, Oriented x 4  Language: No aphasia  Speech: No dysarthria  Cranial nerves: face symmetric, tongue midline, CN II-XII grossly intact.   Eyes: pupils equal, round, reactive to light with accommodation, EOMI.   Pulmonary: normal respirations, no signs of respiratory distress  Skin: Skin is warm, dry and intact.  Sensory: intact to light touch throughout  Motor Strength: left upper and lower extremities are 5/5 in strength with good tone. Right upper extremity is contracted and is 1/5 throughout. RLE is 2/5 in HF and KF, 1/5 in KE, 2/5 in DF/PF/EHL        Significant Labs:  Recent Labs   Lab 04/28/22 0441   *      K 3.8      CO2 24   BUN 9   CREATININE 0.9   CALCIUM 9.0     Recent Labs   Lab 04/28/22  0417 04/28/22 0441   WBC  --  7.81  7.81   HGB  --  13.5*  13.5*   HCT 42 41.5  41.5   PLT  --  244  244     Recent Labs   Lab 04/28/22 0441   INR 3.1*     Microbiology Results (last 7 days)       Procedure Component Value Units Date/Time    Blood culture [[id number]] Collected: 04/28/22 1201    Order Status: Sent Specimen: Blood from Peripheral, Forearm, Right Updated: 04/28/22 1213    Blood culture [[id number]] Collected: 04/28/22 1201    Order Status: Sent Specimen: Blood from Peripheral, Hand, Left Updated: 04/28/22 1213          All pertinent labs from the last 24 hours have been reviewed.    Significant Diagnostics:    Imaging Results               MRI Cervical Spine W WO Cont (Final result)  Result time 04/28/22 11:30:13   Procedure changed from MRI Cervical Spine With Contrast     Final result by Rhett Conway MD (04/28/22 11:30:13)                   Impression:      1. Abnormal nonspecific posterior epidural fluid, differential diagnosis includes hematoma and abscess.   This in conjunction with juxta-articular posterior soft tissue edema particularly on left and inter spinous process region, superimposed muscle strain posttraumatic change favored in may be responsible for the posterior epidural fluid which could represent hematoma.  No osteomyelitis or septic facet joint disease or discitis or acute fracture noted on CTA neck exam.  2. Prominent degenerative disc spondylosis facet joint arthropathy changes particularly C3 through C6 levels.  3.  This report was flagged in Epic as abnormal.  4. Neuro surgical consultation suggested as well as clinical correlation.    COMMUNICATION  This critical result was discovered/received at 11:15 04/28/2022.  The critical information above was relayed directly by me by telephone to Dr Bay on 04/28/2022 at 1229 hours.      Electronically signed by: Rhett Conway MD  Date:    04/28/2022  Time:    11:30               Narrative:    EXAMINATION:  MRI CERVICAL SPINE W WO CONTRAST    CLINICAL HISTORY:  cervical spine extending from C3-C6 that may be artifact however the appearance is most concerning for the possibility of an intraspinal epidural process to include the possibility of epidural fluid collection, abscess or hematoma;    TECHNIQUE:  MRI cervical spine without and with intravenous Gadavist 8 cc.    COMPARISON:  CT and MRI imaging of brain, cervical spine dating back 2014.    FINDINGS:  Levoscoliosis cervicothoracic junction with tilting head right.  DJD chronic change anterior C1-C2 with mild hypertrophy transverse ligament and mild indentation adjacent cranial cervical junction.  Marrow space normal.  Degenerative disc spondylosis particularly to C4 through C6.  No fracture subluxation.    Posterior juxta-articular edema noted about left facet joints from C2 through C7 levels as well as between posterior spinous processes, limited similar juxta-articular edema right posterior lateral facet joint structures.  Abnormality right upper  lobe suprahilar region, please refer to CTA chest same day report.    Posterior epidural fluid noted to extend from C2-C3-T1 level with isointense signal spinal cord on T1, heterogeneous increased signal on T2 and FLAIR and limited enhancement of margins on post contrasted exam.  Study obscured by motion artifact particularly postcontrast exam.  Overall exam is of satisfactory diagnostic quality.    C2-C3 posterior disc normal, facet joint arthropathy with mild moderate right foramen and no left foramen and mild spinal stenosis.    C3-C4 mild moderate posterior central disc desiccated bulge, mild compression anterior spinal cord, moderate soft tissue spinal stenosis in conjunction with posterior epidural compression of spinal cord mild moderate degree.    C4-C5 mild moderate posterior disc bulge spondylosis, mild compression anterior spinal cord, severe compression posterior spinal cord by epidural fluid particularly on right, facet joint arthropathy with mild moderate foraminal and moderate severe soft tissues spinal stenosis.    C5-C6 mild moderate posterior disc bulge spondylosis, paracentral prominent left, mild moderate compression anterior spinal cord, moderate compression posterior spinal cord by epidural fluid, facet joint arthropathy with mild moderate foramen stenosis particularly on left, moderate soft tissue spinal stenosis.    C6-C7 mild moderate posterior disc bulge spondylosis, posterior central annular fissure, moderate compression anterior spinal cord, additional mild moderate compression posterior spinal cord by a epidural fluid, facet joint arthropathy, mild moderate foraminal stenosis more prominent on left, moderate soft tissue spinal stenosis.    C7-T1 posterior disc margin normal.  Some hypertrophy posterior spinal ligament, mild moderate compression posterior spinal sac, mild moderate soft tissue spinal stenosis.    Posterior spinal fluid collection extends to level of T1-T2 with mild  moderate compression posterior spinal sac and mild moderate spinal stenosis.  Upper dorsal spinal cord and spinal canal otherwise appears intact.    No additional abnormal intra-axial extra-axial enhancement post-contrast exam.  No definite abnormal signal within spinal cord.                                       MRI Brain W WO Contrast (Final result)  Result time 04/28/22 10:49:28   Procedure changed from MRI Brain Without Contrast     Final result by Rhett Conway MD (04/28/22 10:49:28)                   Impression:      New metal opacities within posterior frontal high convexity scalp, perhaps representing post laceration therapy and clinical correlation requested.    MRI brain otherwise stable and unchanged.  No new stroke hemorrhage or subdural fluid.    Numerous areas of supratentorial gliosis.      Electronically signed by: Rhett Conway MD  Date:    04/28/2022  Time:    10:49               Narrative:    EXAMINATION:  MRI BRAIN W WO CONTRAST    CLINICAL HISTORY:  right sided weakness;    TECHNIQUE:  Multiplanar multisequence MR imaging of the brain was performed before and after the administration of 8 mL Gadavist intravenous contrast.    COMPARISON:  CTA head and neck, CT head same day and MRI CT brain studies dating back 2021 and CT soft tissue neck 2016    FINDINGS:  New metal scalp artifacts posterior frontal high convexity appearing since CT same day 03:50, could represent post laceration therapy and clinical correlation requested.    Central and cortical atrophy remains prominent particularly left posterior frontal lobe cortex.    Numerous scattered foci deep subcortical white matter gliosis particularly frontal and lesser degree parietooccipital more prominent on right, involvement of upper basal ganglia central semi ovale, subinsular cortex again more prominent on right.  Small foci increased signals cerebral peduncle and possibly vague area of linea are subtle gliosis beneath floor 4th  ventricle.  Diffusion and gradient echo studies negative.    No new intra-axial or extra-axial enhancement of brain, hemorrhage, subdural fluid, mass or acute stroke.    Nasal septal deviation spur projects left with some mucosal hypertrophy of ethmoid sinuses.  Distal vertebrobasilar distal internal carotid arteries and major branches patent flow void.                                        CTA Head and Neck (xpd) (Final result)  Result time 04/28/22 05:38:06      Final result by Rigoberto De Jesus MD (04/28/22 05:38:06)                   Impression:      The major intracranial and extracranial arterial vascular structures of the head and neck demonstrate no evidence for high-grade stenosis or occlusion, or dissection, and there is no evidence for intracranial aneurysm or AVM.    There is a finding of for a to the cervical spine extending from C3-C6 that may be artifact however the appearance is most concerning for the possibility of an intraspinal epidural process to include the possibility of epidural fluid collection, abscess or hematoma, clinical and historical correlation is needed, MRI examination of the cervical spine with contrast is recommended.    This report was flagged in Epic as abnormal.    The findings were reported to Dr Joyce in the ER at the time of dictation.      Electronically signed by: Rigoberto De Jesus  Date:    04/28/2022  Time:    05:38               Narrative:    EXAMINATION:  CTA HEAD AND NECK (XPD)    CLINICAL HISTORY:  Neuro deficit, acute, stroke suspected;    TECHNIQUE:  Non contrast low dose axial images were obtained through the head.  CT angiogram was performed from the level of the nae to the top of the head following the IV administration of 100mL of Omnipaque 350.   Sagittal and coronal reconstructions and maximum intensity projection reconstructions were performed. Arterial stenosis percentages are based on NASCET measurement criteria.    COMPARISON:  CT examination of the  brain without contrast April 28, 2022, CT examination of the chest without contrast April 28, 2022    FINDINGS:  The vertebral arteries bilaterally demonstrate appropriate opacification extending to the level of the basilar artery which also demonstrates appropriate opacification.  The P1 segment of the posterior cerebral artery bilaterally appears hypoplastic, and there appear to be bilateral posterior communicating arteries, otherwise the PCA bilaterally demonstrate appropriate opacification.    The common, external and internal carotid arteries bilaterally demonstrate appropriate opacification, the internal carotid arteries demonstrate opacification to the level of the [id number].  There is no evidence for high-grade stenosis or occlusion or dissection of the internal carotid arteries.  The anterior cerebral artery and middle cerebral artery bilaterally demonstrate appropriate opacification.  There is suggestion of a thin small anterior communicating artery.    There is no evidence for intracranial aneurysm or AVM.  The intracranial venous structures appear appropriate.    The visualized orbits appear appropriate.  The paranasal sinuses and mastoid air cells appear appropriate.  The parapharyngeal space bilaterally appears clear, there is no evidence for retropharyngeal abscess.  There is no abnormal thickening of the epiglottis.  The parotid, submandibular and thyroid glands appear appropriate.  The visualized osseous structures demonstrate chronic change.  Findings referable to the lung apices are discussed on the CT examination of the chest of the same date, referral to the chest CT report is recommended.    As best seen on axial series 3 and axial series 2 along the posterior aspect of the spinal canal, and appearing to extend to the right there is subtle suggestion of accentuated attenuation, along the cervical spinal canal extending from approximately C3 to C6.  This is also suggested on sagittal  series 602.  This may be artifactual however an intraspinal epidural process to include epidural fluid collection, including the possibility of intraspinal epidural hematoma or abscess cannot be excluded based upon the appearance.  MRI examination of the cervical spine with contrast is recommended for further evaluation.                                        CTA Chest Abdomen Pelvis (Final result)  Result time 04/28/22 05:38:52   Procedure changed from CTA Chest Abdomen Non Coronary     Final result by Rigoberto De Jesus MD (04/28/22 05:38:52)                   Impression:      The thoracic and abdominal aorta demonstrate no evidence for aneurysmal dilatation, aortic dissection or acute aortic injury.    Intrathoracic findings appears stable when compared to the CT examination of the chest of the same date, referral to the chest CT of the same date report is recommended.    Diverticula of the colon are noted in there is a segment of the sigmoid colon demonstrating mild wall thickening, this may relate to concentric muscular hypertrophy of diverticulosis however correlation for mild diverticulitis is needed, if the patient has not had recent colon screening exam I would recommend follow-up colonoscopy or fluoroscopic barium examination to exclude neoplasm.    Irregular contour of the liver again noted, correlation for chronic hepatic disease is needed.    Cholelithiasis is noted, there is no evidence for pericholecystic inflammatory change.    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    This report was flagged in Epic as abnormal.    The findings were reported to Dr Joyce in the ER at the time of dictation.      Electronically signed by: Rigoberto De Jesus  Date:    04/28/2022  Time:    05:38               Narrative:    EXAMINATION:  CTA CHEST ABDOMEN PELVIS    CLINICAL HISTORY:  Aortic dissection suspected;    TECHNIQUE:  CT examination of the chest abdomen and pelvis was  performed via aortic angiographic protocol after the administration of 100 mL Omnipaque 350 intravenous contrast, timing of imaging optimized for evaluation of the aorta.  Axial imaging, sagittal and coronal reconstruction imaging is submitted.  Axial MIP imaging ting of the chest is submitted.    COMPARISON:  CT examination of the chest without contrast April 28, 2022, CTA examination chest abdomen and pelvis October 15, 2018    FINDINGS:  The thoracic and abdominal aorta demonstrate appropriate opacification, there is no evidence for acute aortic leak, aortic dissection, or aneurysmal dilatation and no evidence for periaortic hematoma.  The visualized great vessels appear appropriate.  The pulmonary arterial vasculature appear appropriate.  The celiac artery, hepatic artery, and splenic artery demonstrate appropriate opacification as does the superior mesenteric artery which demonstrates mild narrowing at its origin without high-grade stenosis.  The renal arteries, inferior mesenteric arteries and iliac arterial vasculature appear appropriate.    Intrathoracic findings including cavitary lesion and abnormal areas of multifocal opacity and nodularity of the right lung appear stable when compared to the prior examination of the same date, the left hemithorax appears stable as well, referral to the earlier chest CT report is recommended.  Mediastinal adenopathy is again noted.  There is no pericardial effusion, there is no hemopericardium, there is no hemothorax.  There is no pneumothorax.    Diminished attenuation of the liver consistent with diffuse fatty infiltrate is noted, irregular contour of the liver again noted, correlation for chronic hepatic disease is needed.  Cholelithiasis is noted, there is no evidence for pericholecystic or peripancreatic inflammatory change.  The stomach appears unremarkable for degree of distention.  There is no evidence for acute process of the spleen or adrenal glands.  There is  no hydronephrosis or obstructive uropathy bilaterally.  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  The urinary bladder appears unremarkable for degree of distention.    There is no evidence for small bowel obstructive process.  The appendix is identified, it does not appear inflamed.  Mild prominence of the colon with stool is noted.  Diverticula of the colon are noted.  There is a segment of the sigmoid colon as best seen on axial series 2, images 770 through 809 that demonstrates mild wall thickening, this may relate to concentric muscular hypertrophy of diverticulosis, as significant pericolonic inflammatory change is not appreciated however correlation for signs or symptoms of mild colitis or diverticulitis is needed, if the patient has not had recent colon screening exam I would recommend follow-up colonoscopy or fluoroscopic barium exam to exclude the possibility of neoplasm.  There is no additional evidence for colonic inflammatory or obstructive change.  There is no free intraperitoneal air.  The visualized osseous structures demonstrate chronic change.                                       CT Chest Without Contrast (Final result)  Result time 04/28/22 04:26:44      Final result by Rigoberto De Jesus MD (04/28/22 04:26:44)                   Impression:      Previously identified right upper lobe cavitary lung lesion again noted, diminishing size, in addition there are multifocal areas of pulmonary opacity, areas of consolidation, airspace disease, and nodularity, involving the right hemithorax, overall demonstrating mild improvement.    Chronic lung changes bilaterally are again noted, and the left hemithorax appears stable.    Mediastinal adenopathy appears stable.    Findings referable to the liver for which correlation for chronic hepatic disease is needed again noted.    Cholelithiasis, there is no evidence for pericholecystic inflammatory  change.    Mild pericardial thickening or fluid anteriorly, without large pericardial effusion.      Electronically signed by: Rigoberto De Jesus  Date:    04/28/2022  Time:    04:26               Narrative:    EXAMINATION:  CT CHEST WITHOUT CONTRAST    CLINICAL HISTORY:  back pain, neurologic deficits;    TECHNIQUE:  Low dose axial images, sagittal and coronal reformations were obtained from the thoracic inlet to the lung bases. Contrast was not administered.  The lack of intravenous contrast diminishes the sensitivity of the examination.    COMPARISON:  CT examination of the chest without contrast November 2, 2021    FINDINGS:  The previously identified cavitary lesion of the right upper lobe is again noted, it appears diminished in size when compared to the prior examination, when measured at a similar level to the prior study it measures approximately 1.7 by 3.4 cm on axial imaging compared to 3.3 x 4.4 cm on the prior exam.  There is appearance consistent with bronchial communication to the cavitary lesion, appearing similar to the prior exam.  This is somewhat more conspicuous at this time.  There is continued appearance of surrounding patchy and confluent and nodular opacities with multifocal airspace opacities and of the right lung, the overall pattern and distribution is similar to the prior study, however the overall appearance is that of improvement.    Chronic appearing lung changes are again noted.  There are mild atelectatic changes noted as well.  The left hemithorax appears stable.  There is no evidence for significant pleural effusion and there is no evidence for pneumothorax.    Mediastinal adenopathy is again noted appearing stable.  There is mild greater thickening or fluid along the pericardium anteriorly.  Otherwise the appearance of the heart and great vessels is stable, evaluation is limited on this noncontrast exam.    Limited imaging of the upper abdomen demonstrates evidence for cholelithiasis  without evidence for pericholecystic inflammatory change.  Irregular contour of the liver again noted, correlation for chronic hepatic disease is needed.  The visualized osseous structures appear intact.  Chronic changes are noted.                                       CT Head Without Contrast (Final result)  Result time 04/28/22 04:07:27      Final result by Rigoberto De Jesus MD (04/28/22 04:07:27)                   Impression:      Chronic changes are noted, there is no evidence for superimposed acute intracranial process.      Electronically signed by: Rigoberto De Jesus  Date:    04/28/2022  Time:    04:07               Narrative:    EXAMINATION:  CT HEAD WITHOUT CONTRAST    CLINICAL HISTORY:  Neuro deficit, acute, stroke suspected;    TECHNIQUE:  Low dose axial images were obtained through the head.  Coronal and sagittal reformations were also performed. Contrast was not administered.    COMPARISON:  CT examination of the brain without contrast February 2, 2021    FINDINGS:  The ventricular system, sulcal pattern and parenchymal attenuation characteristics are consistent with chronic change.  Involutional changes noted, chronic appearing white matter change noted.  There is no evidence for superimposed acute process.  There is no evidence for intracranial mass, mass effect or midline shift, there is no evidence for acute intracranial hemorrhage.  Appropriate CSF spaces are seen at the skull base.    The visualized orbits appear intact.  The mastoid air cells and middle ear cavity bilaterally appear appropriate, as do the paranasal sinuses.  The visualized osseous structures appear intact.                                          Assessment/Plan:     * Epidural hematoma  80 y/o male with APS on coumadin, hx PE, MAC pneumonia, HLD with spontaneous C3-C6 epidural hematoma compressing the spinal cord    -Class A to OR for C3-C6 laminectomy for evacuation of epidural hematoma, possible instrumentation and  fusion  -Kcentra and Vit K STAT for coumadin reversal, patient may be candidate for IVC filter post operatively, will address at a later date  -Type and cross 2 units PRBC and 1 unit FFP for OR  -Stop ASA, coumadin, and zosyn  -Likely NCC post op for MAP goals and close neuro monitoring     Discussed with Dr. Miller               Thank you for your consult. I will follow-up with patient. Please contact us if you have any additional questions.    Nicole Colbert PA-C  Neurosurgery  [facility]

## 2022-04-28 NOTE — ED PROVIDER NOTES
Encounter Date: 4/28/2022       History     Chief Complaint   Patient presents with    Back Pain     [facility] with c/o upper back pain and right arm pain, taking warfarin and azithromycin, Hx PE and mycobacterium    Transfer     [facility] transfer for epidural abscess     81-year-old male with history of prostate CA, antiphospholipid syndrome, PE (on Coumadin), mac, hyperlipidemia, and DM T2 who was transferred from [facility] for neurosurgical evaluation.  Reports last seen normal 8:30 p.m. states he was having upper back and neck pain at approximately midnight and called his grandson complaining of pain and at around 2:00 a.m. began having right upper and lower extremity weakness.  States he was unable to move his right arm or leg which prompted them to call EMS.  Patient received MRI of the brain and spine with nonspecific posterior epidural fluid which may represent abscess versus hematoma.  Patient denies any recent fevers/chills, saddle anesthesia, loss of bowel or bladder, history of IV drug use.        Review of patient's allergies indicates:  No Known Allergies  Past Medical History:   Diagnosis Date    Antiphospholipid syndrome 11/19/2021    Dysphagia     Hx of colonic polyps     followed by GI. Dr. Pham    Hyperlipidemia LDL goal <100     Overweight(278.02)     Prostate cancer september 2011    followed by urology, Dr. Rogers    Type II or unspecified type diabetes mellitus without mention of complication, not stated as uncontrolled     diet controlled     Past Surgical History:   Procedure Laterality Date    BRONCHOSCOPY N/A 10/15/2018    Procedure: BRONCHOSCOPY;  Surgeon: Pratibha Diagnostic Provider;  Location: [facility] OR [address] [place] [address];  Service: Anesthesiology;  Laterality: N/A;    CATARACT EXTRACTION, BILATERAL  2014    PROSTATECTOMY       Family History   Problem Relation Age of Onset    Colon cancer Mother     Diabetes Mother     Heart disease Father     Mental illness Sister      Diabetes Sister     Other Brother         polio as a child    Pulmonary fibrosis Brother     Diabetes Unknown     Diabetes Brother     Myasthenia gravis Brother     Parkinsonism Brother     Diabetes Brother     Dementia Brother     Lung cancer Brother         smoker    Diabetes Maternal Aunt      Social History     Tobacco Use    Smoking status: Former Smoker     Packs/day: 0.25     Years: 5.00     Pack years: 1.25     Quit date: 10/2/1962     Years since quittin.6    Smokeless tobacco: Never Used    Tobacco comment: quit age 23   Substance Use Topics    Alcohol use: Yes     Comment: beer occasionally    Drug use: No     Review of Systems   Constitutional: Negative for chills and fever.   HENT: Negative for sore throat.    Eyes: Negative for pain.   Respiratory: Negative for cough and shortness of breath.    Cardiovascular: Negative for chest pain.   Gastrointestinal: Negative for abdominal pain, nausea and vomiting.   Genitourinary: Negative for difficulty urinating and dysuria.   Musculoskeletal: Positive for arthralgias and back pain.   Skin: Negative.    Neurological: Positive for weakness.   Psychiatric/Behavioral: Positive for confusion.       Physical Exam     Initial Vitals [22 0319]   BP Pulse Resp Temp SpO2   (!) 143/79 62 18 98.7 °F (37.1 °C) 98 %      MAP       --         Physical Exam    Nursing note and vitals reviewed.  Constitutional: He appears well-developed and well-nourished. He is not diaphoretic. No distress.   HENT:   Head: Normocephalic and atraumatic.   Eyes: Conjunctivae are normal. Pupils are equal, round, and reactive to light.   Neck: Neck supple.   Normal range of motion.  Cardiovascular: Regular rhythm, normal heart sounds and intact distal pulses. Exam reveals no gallop and no friction rub.    No murmur heard.  Pulmonary/Chest: Breath sounds normal.   Abdominal: Abdomen is soft. Bowel sounds are normal. He exhibits no distension and no mass. There is no  abdominal tenderness. There is no rebound and no guarding.   Musculoskeletal:      Cervical back: Normal range of motion and neck supple.      Comments: Patient's right upper extremity contracted at the elbow and wrist.  Able to passively range both the elbow and wrist.    2+ radial pulse with normal sensation in the upper extremities.  Patient unable to  with the right hand.    Left lower extremity 2+ dorsalis pedis with good sensation in both lower extremities.  Patient's has 4/5 muscle strength in the right leg and is only able to lift his leg 1 in off the bed..     Neurological: He is alert and oriented to person, place, and time. GCS score is 15. GCS eye subscore is 4. GCS verbal subscore is 5. GCS motor subscore is 6.   Skin: Skin is warm and dry. Capillary refill takes less than 2 seconds.   Psychiatric: He has a normal mood and affect.         ED Course   Procedures  Labs Reviewed   CBC W/ AUTO DIFFERENTIAL - Abnormal; Notable for the following components:       Result Value    Hemoglobin 13.5 (*)     RDW 14.8 (*)     All other components within normal limits   COMPREHENSIVE METABOLIC PANEL - Abnormal; Notable for the following components:    Glucose 133 (*)     All other components within normal limits   CBC W/ AUTO DIFFERENTIAL - Abnormal; Notable for the following components:    Hemoglobin 13.5 (*)     RDW 14.8 (*)     All other components within normal limits   LIPID PANEL - Abnormal; Notable for the following components:    Cholesterol 116 (*)     HDL 36 (*)     LDL Cholesterol 62.4 (*)     All other components within normal limits   PROTIME-INR - Abnormal; Notable for the following components:    Prothrombin Time 31.8 (*)     INR 3.1 (*)     All other components within normal limits   HEMOGLOBIN A1C - Abnormal; Notable for the following components:    Hemoglobin A1C 5.7 (*)     All other components within normal limits   ISTAT PROCEDURE - Abnormal; Notable for the following components:    POC  Glucose 136 (*)     POC Anion Gap 17 (*)     All other components within normal limits   ISTAT PROCEDURE - Abnormal; Notable for the following components:    POC PTWBT 29.3 (*)     POC PTINR 2.6 (*)     All other components within normal limits   CULTURE, BLOOD   CULTURE, BLOOD   TSH   SARS-COV-2 RNA AMPLIFICATION, QUAL   PROCALCITONIN    Narrative:     Suspect COVID 19   LACTIC ACID, PLASMA    Narrative:     Suspect COVID 19   POCT GLUCOSE, HAND-HELD DEVICE   TYPE & SCREEN   ISTAT CHEM8   POCT PROTIME-INR   POCT PROTIME-INR   ISTAT CHEM8   PREPARE RBC SOFT   PREPARE FRESH FROZEN PLASMA SOFT        ECG Results          ECG 12 lead (Final result)  Result time 04/28/22 10:58:32    Final result by Interface, Lab In [facility] (04/28/22 10:58:32)                 Narrative:    Test Reason : I63.9,    Vent. Rate : 057 BPM     Atrial Rate : 057 BPM     P-R Int : 172 ms          QRS Dur : 088 ms      QT Int : 476 ms       P-R-T Axes : 069 077 070 degrees     QTc Int : 463 ms    Sinus bradycardia  Otherwise normal ECG  When compared with ECG of 30-JAN-2021 15:15,  No significant change was found    Confirmed by Karen HERNÁNDEZ, Lucian ([id number]) on 4/28/2022 10:58:27 AM    Referred By: [email or web address]   SELF           Confirmed By:Lucian Jones MD                            Imaging Results           MRI Cervical Spine W WO Cont (Final result)  Result time 04/28/22 11:30:13   Procedure changed from MRI Cervical Spine With Contrast     Final result by Rhett Conway MD (04/28/22 11:30:13)                 Impression:      1. Abnormal nonspecific posterior epidural fluid, differential diagnosis includes hematoma and abscess.  This in conjunction with juxta-articular posterior soft tissue edema particularly on left and inter spinous process region, superimposed muscle strain posttraumatic change favored in may be responsible for the posterior epidural fluid which could represent hematoma.  No osteomyelitis or septic facet joint disease  or discitis or acute fracture noted on CTA neck exam.  2. Prominent degenerative disc spondylosis facet joint arthropathy changes particularly C3 through C6 levels.  3.  This report was flagged in Epic as abnormal.  4. Neuro surgical consultation suggested as well as clinical correlation.    COMMUNICATION  This critical result was discovered/received at 11:15 04/28/2022.  The critical information above was relayed directly by me by telephone to Dr Bay on 04/28/2022 at 1229 hours.      Electronically signed by: Rhett Conway MD  Date:    04/28/2022  Time:    11:30             Narrative:    EXAMINATION:  MRI CERVICAL SPINE W WO CONTRAST    CLINICAL HISTORY:  cervical spine extending from C3-C6 that may be artifact however the appearance is most concerning for the possibility of an intraspinal epidural process to include the possibility of epidural fluid collection, abscess or hematoma;    TECHNIQUE:  MRI cervical spine without and with intravenous Gadavist 8 cc.    COMPARISON:  CT and MRI imaging of brain, cervical spine dating back 2014.    FINDINGS:  Levoscoliosis cervicothoracic junction with tilting head right.  DJD chronic change anterior C1-C2 with mild hypertrophy transverse ligament and mild indentation adjacent cranial cervical junction.  Marrow space normal.  Degenerative disc spondylosis particularly to C4 through C6.  No fracture subluxation.    Posterior juxta-articular edema noted about left facet joints from C2 through C7 levels as well as between posterior spinous processes, limited similar juxta-articular edema right posterior lateral facet joint structures.  Abnormality right upper lobe suprahilar region, please refer to CTA chest same day report.    Posterior epidural fluid noted to extend from C2-C3-T1 level with isointense signal spinal cord on T1, heterogeneous increased signal on T2 and FLAIR and limited enhancement of margins on post contrasted exam.  Study obscured by motion artifact  particularly postcontrast exam.  Overall exam is of satisfactory diagnostic quality.    C2-C3 posterior disc normal, facet joint arthropathy with mild moderate right foramen and no left foramen and mild spinal stenosis.    C3-C4 mild moderate posterior central disc desiccated bulge, mild compression anterior spinal cord, moderate soft tissue spinal stenosis in conjunction with posterior epidural compression of spinal cord mild moderate degree.    C4-C5 mild moderate posterior disc bulge spondylosis, mild compression anterior spinal cord, severe compression posterior spinal cord by epidural fluid particularly on right, facet joint arthropathy with mild moderate foraminal and moderate severe soft tissues spinal stenosis.    C5-C6 mild moderate posterior disc bulge spondylosis, paracentral prominent left, mild moderate compression anterior spinal cord, moderate compression posterior spinal cord by epidural fluid, facet joint arthropathy with mild moderate foramen stenosis particularly on left, moderate soft tissue spinal stenosis.    C6-C7 mild moderate posterior disc bulge spondylosis, posterior central annular fissure, moderate compression anterior spinal cord, additional mild moderate compression posterior spinal cord by a epidural fluid, facet joint arthropathy, mild moderate foraminal stenosis more prominent on left, moderate soft tissue spinal stenosis.    C7-T1 posterior disc margin normal.  Some hypertrophy posterior spinal ligament, mild moderate compression posterior spinal sac, mild moderate soft tissue spinal stenosis.    Posterior spinal fluid collection extends to level of T1-T2 with mild moderate compression posterior spinal sac and mild moderate spinal stenosis.  Upper dorsal spinal cord and spinal canal otherwise appears intact.    No additional abnormal intra-axial extra-axial enhancement post-contrast exam.  No definite abnormal signal within spinal cord.                               MRI Brain W WO  Contrast (Final result)  Result time 04/28/22 10:49:28   Procedure changed from MRI Brain Without Contrast     Final result by Rhett Conway MD (04/28/22 10:49:28)                 Impression:      New metal opacities within posterior frontal high convexity scalp, perhaps representing post laceration therapy and clinical correlation requested.    MRI brain otherwise stable and unchanged.  No new stroke hemorrhage or subdural fluid.    Numerous areas of supratentorial gliosis.      Electronically signed by: Rhett Conway MD  Date:    04/28/2022  Time:    10:49             Narrative:    EXAMINATION:  MRI BRAIN W WO CONTRAST    CLINICAL HISTORY:  right sided weakness;    TECHNIQUE:  Multiplanar multisequence MR imaging of the brain was performed before and after the administration of 8 mL Gadavist intravenous contrast.    COMPARISON:  CTA head and neck, CT head same day and MRI CT brain studies dating back 2021 and CT soft tissue neck 2016    FINDINGS:  New metal scalp artifacts posterior frontal high convexity appearing since CT same day 03:50, could represent post laceration therapy and clinical correlation requested.    Central and cortical atrophy remains prominent particularly left posterior frontal lobe cortex.    Numerous scattered foci deep subcortical white matter gliosis particularly frontal and lesser degree parietooccipital more prominent on right, involvement of upper basal ganglia central semi ovale, subinsular cortex again more prominent on right.  Small foci increased signals cerebral peduncle and possibly vague area of linea are subtle gliosis beneath floor 4th ventricle.  Diffusion and gradient echo studies negative.    No new intra-axial or extra-axial enhancement of brain, hemorrhage, subdural fluid, mass or acute stroke.    Nasal septal deviation spur projects left with some mucosal hypertrophy of ethmoid sinuses.  Distal vertebrobasilar distal internal carotid arteries and major  branches patent flow void.                                CTA Head and Neck (xpd) (Final result)  Result time 04/28/22 05:38:06    Final result by Rigoberto De Jesus MD (04/28/22 05:38:06)                 Impression:      The major intracranial and extracranial arterial vascular structures of the head and neck demonstrate no evidence for high-grade stenosis or occlusion, or dissection, and there is no evidence for intracranial aneurysm or AVM.    There is a finding of for a to the cervical spine extending from C3-C6 that may be artifact however the appearance is most concerning for the possibility of an intraspinal epidural process to include the possibility of epidural fluid collection, abscess or hematoma, clinical and historical correlation is needed, MRI examination of the cervical spine with contrast is recommended.    This report was flagged in Epic as abnormal.    The findings were reported to Dr Joyce in the ER at the time of dictation.      Electronically signed by: Rigoberto De Jesus  Date:    04/28/2022  Time:    05:38             Narrative:    EXAMINATION:  CTA HEAD AND NECK (XPD)    CLINICAL HISTORY:  Neuro deficit, acute, stroke suspected;    TECHNIQUE:  Non contrast low dose axial images were obtained through the head.  CT angiogram was performed from the level of the nae to the top of the head following the IV administration of 100mL of Omnipaque 350.   Sagittal and coronal reconstructions and maximum intensity projection reconstructions were performed. Arterial stenosis percentages are based on NASCET measurement criteria.    COMPARISON:  CT examination of the brain without contrast April 28, 2022, CT examination of the chest without contrast April 28, 2022    FINDINGS:  The vertebral arteries bilaterally demonstrate appropriate opacification extending to the level of the basilar artery which also demonstrates appropriate opacification.  The P1 segment of the posterior cerebral artery bilaterally  appears hypoplastic, and there appear to be bilateral posterior communicating arteries, otherwise the PCA bilaterally demonstrate appropriate opacification.    The common, external and internal carotid arteries bilaterally demonstrate appropriate opacification, the internal carotid arteries demonstrate opacification to the level of the [id number].  There is no evidence for high-grade stenosis or occlusion or dissection of the internal carotid arteries.  The anterior cerebral artery and middle cerebral artery bilaterally demonstrate appropriate opacification.  There is suggestion of a thin small anterior communicating artery.    There is no evidence for intracranial aneurysm or AVM.  The intracranial venous structures appear appropriate.    The visualized orbits appear appropriate.  The paranasal sinuses and mastoid air cells appear appropriate.  The parapharyngeal space bilaterally appears clear, there is no evidence for retropharyngeal abscess.  There is no abnormal thickening of the epiglottis.  The parotid, submandibular and thyroid glands appear appropriate.  The visualized osseous structures demonstrate chronic change.  Findings referable to the lung apices are discussed on the CT examination of the chest of the same date, referral to the chest CT report is recommended.    As best seen on axial series 3 and axial series 2 along the posterior aspect of the spinal canal, and appearing to extend to the right there is subtle suggestion of accentuated attenuation, along the cervical spinal canal extending from approximately C3 to C6.  This is also suggested on sagittal series 602.  This may be artifactual however an intraspinal epidural process to include epidural fluid collection, including the possibility of intraspinal epidural hematoma or abscess cannot be excluded based upon the appearance.  MRI examination of the cervical spine with contrast is recommended for further evaluation.                                 CTA Chest Abdomen Pelvis (Final result)  Result time 04/28/22 05:38:52   Procedure changed from CTA Chest Abdomen Non Coronary     Final result by Rigoberto De Jesus MD (04/28/22 05:38:52)                 Impression:      The thoracic and abdominal aorta demonstrate no evidence for aneurysmal dilatation, aortic dissection or acute aortic injury.    Intrathoracic findings appears stable when compared to the CT examination of the chest of the same date, referral to the chest CT of the same date report is recommended.    Diverticula of the colon are noted in there is a segment of the sigmoid colon demonstrating mild wall thickening, this may relate to concentric muscular hypertrophy of diverticulosis however correlation for mild diverticulitis is needed, if the patient has not had recent colon screening exam I would recommend follow-up colonoscopy or fluoroscopic barium examination to exclude neoplasm.    Irregular contour of the liver again noted, correlation for chronic hepatic disease is needed.    Cholelithiasis is noted, there is no evidence for pericholecystic inflammatory change.    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    This report was flagged in Epic as abnormal.    The findings were reported to Dr Joyce in the ER at the time of dictation.      Electronically signed by: Rigoberto De Jesus  Date:    04/28/2022  Time:    05:38             Narrative:    EXAMINATION:  CTA CHEST ABDOMEN PELVIS    CLINICAL HISTORY:  Aortic dissection suspected;    TECHNIQUE:  CT examination of the chest abdomen and pelvis was performed via aortic angiographic protocol after the administration of 100 mL Omnipaque 350 intravenous contrast, timing of imaging optimized for evaluation of the aorta.  Axial imaging, sagittal and coronal reconstruction imaging is submitted.  Axial MIP imaging ting of the chest is submitted.    COMPARISON:  CT examination of the chest without contrast April 28,  2022, CTA examination chest abdomen and pelvis October 15, 2018    FINDINGS:  The thoracic and abdominal aorta demonstrate appropriate opacification, there is no evidence for acute aortic leak, aortic dissection, or aneurysmal dilatation and no evidence for periaortic hematoma.  The visualized great vessels appear appropriate.  The pulmonary arterial vasculature appear appropriate.  The celiac artery, hepatic artery, and splenic artery demonstrate appropriate opacification as does the superior mesenteric artery which demonstrates mild narrowing at its origin without high-grade stenosis.  The renal arteries, inferior mesenteric arteries and iliac arterial vasculature appear appropriate.    Intrathoracic findings including cavitary lesion and abnormal areas of multifocal opacity and nodularity of the right lung appear stable when compared to the prior examination of the same date, the left hemithorax appears stable as well, referral to the earlier chest CT report is recommended.  Mediastinal adenopathy is again noted.  There is no pericardial effusion, there is no hemopericardium, there is no hemothorax.  There is no pneumothorax.    Diminished attenuation of the liver consistent with diffuse fatty infiltrate is noted, irregular contour of the liver again noted, correlation for chronic hepatic disease is needed.  Cholelithiasis is noted, there is no evidence for pericholecystic or peripancreatic inflammatory change.  The stomach appears unremarkable for degree of distention.  There is no evidence for acute process of the spleen or adrenal glands.  There is no hydronephrosis or obstructive uropathy bilaterally.  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  The urinary bladder appears unremarkable for degree of distention.    There is no evidence for small bowel obstructive process.  The appendix is identified, it does not appear inflamed.  Mild prominence  of the colon with stool is noted.  Diverticula of the colon are noted.  There is a segment of the sigmoid colon as best seen on axial series 2, images 770 through 809 that demonstrates mild wall thickening, this may relate to concentric muscular hypertrophy of diverticulosis, as significant pericolonic inflammatory change is not appreciated however correlation for signs or symptoms of mild colitis or diverticulitis is needed, if the patient has not had recent colon screening exam I would recommend follow-up colonoscopy or fluoroscopic barium exam to exclude the possibility of neoplasm.  There is no additional evidence for colonic inflammatory or obstructive change.  There is no free intraperitoneal air.  The visualized osseous structures demonstrate chronic change.                               CT Chest Without Contrast (Final result)  Result time 04/28/22 04:26:44    Final result by Rigoberto De Jesus MD (04/28/22 04:26:44)                 Impression:      Previously identified right upper lobe cavitary lung lesion again noted, diminishing size, in addition there are multifocal areas of pulmonary opacity, areas of consolidation, airspace disease, and nodularity, involving the right hemithorax, overall demonstrating mild improvement.    Chronic lung changes bilaterally are again noted, and the left hemithorax appears stable.    Mediastinal adenopathy appears stable.    Findings referable to the liver for which correlation for chronic hepatic disease is needed again noted.    Cholelithiasis, there is no evidence for pericholecystic inflammatory change.    Mild pericardial thickening or fluid anteriorly, without large pericardial effusion.      Electronically signed by: Rigoberto De Jesus  Date:    04/28/2022  Time:    04:26             Narrative:    EXAMINATION:  CT CHEST WITHOUT CONTRAST    CLINICAL HISTORY:  back pain, neurologic deficits;    TECHNIQUE:  Low dose axial images, sagittal and coronal reformations were  obtained from the thoracic inlet to the lung bases. Contrast was not administered.  The lack of intravenous contrast diminishes the sensitivity of the examination.    COMPARISON:  CT examination of the chest without contrast November 2, 2021    FINDINGS:  The previously identified cavitary lesion of the right upper lobe is again noted, it appears diminished in size when compared to the prior examination, when measured at a similar level to the prior study it measures approximately 1.7 by 3.4 cm on axial imaging compared to 3.3 x 4.4 cm on the prior exam.  There is appearance consistent with bronchial communication to the cavitary lesion, appearing similar to the prior exam.  This is somewhat more conspicuous at this time.  There is continued appearance of surrounding patchy and confluent and nodular opacities with multifocal airspace opacities and of the right lung, the overall pattern and distribution is similar to the prior study, however the overall appearance is that of improvement.    Chronic appearing lung changes are again noted.  There are mild atelectatic changes noted as well.  The left hemithorax appears stable.  There is no evidence for significant pleural effusion and there is no evidence for pneumothorax.    Mediastinal adenopathy is again noted appearing stable.  There is mild greater thickening or fluid along the pericardium anteriorly.  Otherwise the appearance of the heart and great vessels is stable, evaluation is limited on this noncontrast exam.    Limited imaging of the upper abdomen demonstrates evidence for cholelithiasis without evidence for pericholecystic inflammatory change.  Irregular contour of the liver again noted, correlation for chronic hepatic disease is needed.  The visualized osseous structures appear intact.  Chronic changes are noted.                               CT Head Without Contrast (Final result)  Result time 04/28/22 04:07:27    Final result by Rigoberto De Jesus MD  (04/28/22 04:07:27)                 Impression:      Chronic changes are noted, there is no evidence for superimposed acute intracranial process.      Electronically signed by: Rigoberto De Jesus  Date:    04/28/2022  Time:    04:07             Narrative:    EXAMINATION:  CT HEAD WITHOUT CONTRAST    CLINICAL HISTORY:  Neuro deficit, acute, stroke suspected;    TECHNIQUE:  Low dose axial images were obtained through the head.  Coronal and sagittal reformations were also performed. Contrast was not administered.    COMPARISON:  CT examination of the brain without contrast February 2, 2021    FINDINGS:  The ventricular system, sulcal pattern and parenchymal attenuation characteristics are consistent with chronic change.  Involutional changes noted, chronic appearing white matter change noted.  There is no evidence for superimposed acute process.  There is no evidence for intracranial mass, mass effect or midline shift, there is no evidence for acute intracranial hemorrhage.  Appropriate CSF spaces are seen at the skull base.    The visualized orbits appear intact.  The mastoid air cells and middle ear cavity bilaterally appear appropriate, as do the paranasal sinuses.  The visualized osseous structures appear intact.                                 Medications   pravastatin tablet 10 mg (10 mg Oral Given 4/28/22 0959)   mucus clearing device ( Misc.(Non-Drug; Combo Route) Given by Other 4/28/22 0900)   sodium chloride 3% nebulizer solution 4 mL (4 mLs Nebulization Given 4/28/22 0924)   ethambutoL tablet 800 mg (800 mg Oral Given 4/28/22 0959)   azithromycin tablet 500 mg (500 mg Oral Given 4/28/22 0959)   nebulizer and compressor Marcy 1 Device (1 Device Misc.(Non-Drug; Combo Route) Given by Other 4/28/22 0900)   sodium chloride 0.9% flush 10 mL (has no administration in time range)   labetaloL injection 10 mg (has no administration in time range)   acetaminophen tablet 650 mg (has no administration in time range)    ondansetron disintegrating tablet 8 mg (has no administration in time range)   polyethylene glycol packet 17 g (17 g Oral Given 4/28/22 0959)   human prothrombin complex (PCC) (KCENTRA) 1,802.5 Units in sterile water for injection IVPB (has no administration in time range)   phytonadione vitamin k (AQUA-MEPHYTON) 10 mg in dextrose 5 % 50 mL IVPB (has no administration in time range)   sodium chloride 0.9% bolus 1,000 mL (0 mLs Intravenous Stopped 4/28/22 0554)   iohexoL (OMNIPAQUE 350) injection 100 mL (100 mLs Intravenous Given 4/28/22 0436)   fentaNYL 50 mcg/mL injection 12.5 mcg (12.5 mcg Intravenous Given 4/28/22 0450)   HYDROmorphone (PF) injection 0.5 mg (0.5 mg Intravenous Given 4/28/22 0524)   sodium chloride 0.9% bolus 500 mL (0 mLs Intravenous Stopped 4/28/22 1350)   loperamide capsule 4 mg (4 mg Oral Given 4/28/22 0754)   HYDROmorphone (PF) injection 1 mg (1 mg Intravenous Given 4/28/22 0754)   ondansetron disintegrating tablet 4 mg (4 mg Oral Given 4/28/22 0753)   gadobutroL (GADAVIST) injection 8 mL (8 mLs Intravenous Given 4/28/22 0955)   piperacillin-tazobactam 4.5 g in dextrose 5 % 100 mL IVPB (ready to mix system) (0 g Intravenous Stopped 4/28/22 1350)   vancomycin 1.5 g in dextrose 5 % 250 mL IVPB (ready to mix) (0 mg/kg × 72.1 kg Intravenous Stopped 4/28/22 1350)     Medical Decision Making:   History:   Old Medical Records: I decided to obtain old medical records.  Old Records Summarized: records from previous admission(s) and records from another hospital.  Clinical Tests:   Lab Tests: Reviewed  Radiological Study: Reviewed  Medical Tests: Reviewed  Other:   I have discussed this case with another health care provider.       APC / Resident Notes:   81-year-old male transferred from [facility] for neuro surgical evaluation.  Reports upper back and neck pain which started last night.  Patient last seen normal at approximately 8:00 p.m. yesterday.  At approximately 2:00 p.m. began  experiencing right upper and lower extremity weakness.  Patient found to have epidural abscess versus hematoma on MRI was transferred to [facility].  On arrival to [facility] neurosurgery was consulted.  Patient continues to have right upper lower extremity weakness with contractures noted to the right upper extremity.  Patient received broad-spectrum vancomycin and Zosyn prior to transfer.  Neurosurgery will take patient class A to the OR for evacuation.       Attending Attestation:     Physician Attestation Statement for NP/PA:   I discussed this assessment and plan of this patient with the NP/PA, but I did not personally examine the patient. The face to face encounter was performed by the NP/PA.              ED Course as of 04/28/22 1537   Thu Apr 28, 2022   1425 MRI Cervical Spine W WO Cont(!) [HJ]      ED Course User Index  [HJ] Lala Blackwell PA-C             Clinical Impression:   Final diagnoses:  [M79.603] Arm pain  [M54.9] Back pain  [R29.90] Stroke-like symptoms (Primary)  [R93.7] Abnormal CT scan, cervical spine  [G06.2] Epidural abscess          ED Disposition Condition    Transfer to Another Facility Stable              Lala Blackwell PA-C  04/28/22 1537       Jayshree Faulkner MD  04/29/22 1041

## 2022-04-28 NOTE — ASSESSMENT & PLAN NOTE
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 Miller

## 2022-04-28 NOTE — PLAN OF CARE
Problem: SLP  Goal: SLP Goal  Description: Goals:  1. Pt will tolerate regular diet with thin liquids (once MD clears pt to resume po intake, with testing complete) w/o overt s/s of aspiration.   Outcome: Ongoing, Progressing    B/s swallow eval completed. Pt is safe to consume regular diet with thin liquids (once cleared to begin po intake after testing).

## 2022-04-28 NOTE — CONSULTS
[facility]  Vascular Neurology  Comprehensive Stroke Center  TeleVascular Neurology Acute Consultation Note      Consults    Consulting Provider: ROSANNA MINOR  Current Providers  No providers found    Patient Location:  [facility] EMERGENCY DEPARTMENT Emergency Department  Spoke hospital nurse at bedside with patient assisting consultant.     Patient information was obtained from patient.         Assessment/Plan:       Diagnoses:   Acute ischemic left MCA stroke  No tPA - out of window   No LVO on CTA head and neck   Antithrombotics for secondary stroke prevention: Antiplatelets: Aspirin: 81 mg daily  Clopidogrel: 300 mg loading dose x 1, now  Clopidogrel: 75 mg daily    Statins for secondary stroke prevention and hyperlipidemia, if present:   Statins: Atorvastatin- 40 mg daily    Aggressive risk factor modification: HTN     Rehab efforts: The patient has been evaluated by a stroke team provider and the therapy needs have been fully considered based off the presenting complaints and exam findings. The following therapy evaluations are needed: PT evaluate and treat, OT evaluate and treat, SLP evaluate and treat, PM&R evaluate for appropriate placement    Diagnostics ordered/pending: HgbA1C to assess blood glucose levels, Lipid Profile to assess cholesterol levels, MRI head without contrast to assess brain parenchyma, TTE to assess cardiac function/status , TSH to assess thyroid function    VTE prophylaxis: Heparin 5000 units SQ every 8 hours  Mechanical prophylaxis: Place SCDs    BP parameters: Infarct: No intervention, SBP <220            STROKE DOCUMENTATION     Acute Stroke Times:   Acute Stroke Times   Last Known Normal Date: 04/27/22  Last Known Normal Time: 2130  Symptom Onset Date: 04/28/22  Unknown Symptom Onset Date: Unknown Date  Symptom Onset Time: 0200  Unknown Symptom Onset Time: Unknown Time  Stroke Team Called Date: 04/28/22  Stroke Team Called Time:  "0451  Stroke Team Arrival Date: 04/28/22  Stroke Team Arrival Time: 0453  CT Interpretation Time: 0455  Alteplase Recommended: No  Thrombectomy Recommended: No    NIH Scale:  1a. Level of Consciousness: 0-->Alert, keenly responsive  1b. LOC Questions: 0-->Answers both questions correctly  1c. LOC Commands: 0-->Performs both tasks correctly  2. Best Gaze: 0-->Normal  3. Visual: 0-->No visual loss  4. Facial Palsy: 0-->Normal symmetrical movements  5a. Motor Arm, Left: 0-->No drift, limb holds 90 (or 45) degrees for full 10 secs  5b. Motor Arm, Right: 1-->Drift, limb holds 90 (or 45) degrees, but drifts down before full 10 secs, does not hit bed or other support  6a. Motor Leg, Left: 0-->No drift, leg holds 30 degree position for full 5 secs  7. Limb Ataxia: 0-->Absent  8. Sensory: 0-->Normal, no sensory loss  9. Best Language: 0-->No aphasia, normal  10. Dysarthria: 0-->Normal  11. Extinction and Inattention (formerly Neglect): 0-->No abnormality     Modified [place] Coma Scale:    ABCD2 Score:    [id number] Score:   HAS -BLED Score:   ICH Score:   [place] Classification:       Blood pressure (!) 167/81, pulse 60, temperature 98.7 °F (37.1 °C), temperature source Oral, resp. rate 13, height 5' 9" (1.753 m), weight 72.1 kg (159 lb), SpO2 98 %.  Alteplase Eligible?: No  Alteplase Recommendation: Alteplase not recommended due to Outside of treatment window   Possible Interventional Revascularization Candidate? No; No large vessel occlusion identified on imaging     Disposition Recommendation: admit to inpatient    Subjective:     History of Present Illness:  Pt presented to the ED 2/2 chest pain and upper thoracic pain He also had weakness of right arm and leg No numbness or aphasia was reported         Woke up with symptoms?: yes    Recent bleeding noted: no  Does the patient take any Blood Thinners? unknown  Medications: Unknown      Past Medical History: hypertension    Past Surgical History: no " "relevant surgical history    Family History: hypertension    Social History: unable to obtain    Allergies: No Known Allergies No relevant allergies    Review of Systems  Objective:   Vitals: Blood pressure (!) 167/81, pulse 60, temperature 98.7 °F (37.1 °C), temperature source Oral, resp. rate 13, height 5' 9" (1.753 m), weight 72.1 kg (159 lb), SpO2 98 %. BP: chart reviewed    CT READ: Yes  No hemmorhage. No mass effect. No early infarct signs.     Physical Exam      Right arm and leg drift         Recommended the emergency room physician to have a brief discussion with the patient and/or family if available regarding the  risks and benefits of treatment, and to briefly document the occurrence of that discussion in his clinical encounter note.     The attending portion of this evaluation, treatment, and documentation was performed per Roosevelt Cardenas MD via audiovisual.    Billing code:  (moderate to severe stroke, large areas of edema, some mimics)    · This patient has a critical neurological condition/illness, with high morbidity and mortality.  · There is a high probability for acute neurological change leading to clinical and possibly life-threatening deterioration requiring highest level of physician preparedness for urgent intervention.  · Care was coordinated with other physicians involved in the patient's care.  · Radiologic studies and laboratory data were reviewed and interpreted, and plan of care was re-assessed based on the results.  · Diagnosis, treatment options and prognosis may have been discussed with the patient and/or family members or caregiver.  · Further advanced medical management and further evaluation is warranted for his care.      In your opinion, this was a: Tier 2 Van Negative    Consult End Time: 6:02 AM     Roosevelt Cardenas MD  Comprehensive Stroke Center  Vascular Neurology   [facility]  "

## 2022-04-28 NOTE — PT/OT/SLP PROGRESS
Occupational Therapy      Patient Name:  Diego Gunter   MRN:  [id number]    Patient not seen today secondary to Other (Comment) (The patient remains in the ED for medical work up). Will follow-up as appropriate.    4/28/2022

## 2022-04-28 NOTE — ED NOTES
Pt arrives back into ED following MRI, and reports a new onset of loss of feeling in bilateral legs and arms. Pt unable to move arms or legs other than an occasional flailing of arms. MD notified and come down to examine pt. MD awaiting for MRI results.

## 2022-04-28 NOTE — ED NOTES
Chief Complaint   Patient presents with    Medication Refill       SUBJECTIVE:  Mer Ponce is a 33 y.o. female here for new problem of medication refills and doing well.  Good efficacy, no side effects of the medication.  She has minimal stimulant effects at all. Patient denies any exertional chest pain, dyspnea, palpitations, syncope, orthopnea, edema or paroxysmal nocturnal dyspnea.    She does have a flare of her allergies, with sneezing, itchy eyes poor sleep, slight fatigue.  Currently has co-morbidities including per problem list.      Past Medical History:   Diagnosis Date    ADD (attention deficit disorder)     Asthma     BRONCHITIS IN PAST    Menarche     Age of onset 14     Past Surgical History:   Procedure Laterality Date     SECTION       Social History     Social History    Marital status:      Spouse name: N/A    Number of children: N/A    Years of education: N/A     Occupational History    Not on file.     Social History Main Topics    Smoking status: Never Smoker    Smokeless tobacco: Never Used    Alcohol use No    Drug use: No    Sexual activity: Yes     Partners: Male     Birth control/ protection: Surgical     Other Topics Concern    Not on file     Social History Narrative    No narrative on file     Family History   Problem Relation Age of Onset    Breast cancer Mother     Diabetes Neg Hx     Colon cancer Neg Hx     Ovarian cancer Neg Hx      Current Outpatient Prescriptions on File Prior to Visit   Medication Sig Dispense Refill    [DISCONTINUED] dextroamphetamine-amphetamine (ADDERALL XR) 20 MG 24 hr capsule Take 1 capsule (20 mg total) by mouth every morning. 30 capsule 0     No current facility-administered medications on file prior to visit.      Review of patient's allergies indicates:   Allergen Reactions    [place] Other (See Comments)    Augmentin [amoxicillin-pot clavulanate] Rash         ROS    OBJECTIVE:  /70   Pulse 75    Pt states he started going to the gym, pt states he went to the gym yesterday came home have leg pain.    "Temp 97 °F (36.1 °C) (Oral)   Ht 5' 3" (1.6 m)   Wt 74 kg (163 lb 2.3 oz)   LMP 02/21/2018   SpO2 98%   BMI 28.90 kg/m²     Wt Readings from Last 3 Encounters:   03/12/18 74 kg (163 lb 2.3 oz)   01/09/18 71.4 kg (157 lb 6.5 oz)   10/27/17 70.2 kg (154 lb 12.2 oz)     BP Readings from Last 3 Encounters:   03/12/18 126/70   01/09/18 100/70   10/27/17 110/70       Appears somwhat ill, alert and oriented  Nose with pale boggy mucosa, clear discharge  Throat with PND  S1 and S2 normal, no murmurs, clicks, gallops or rubs. Regular rate and rhythm. Chest is clear; no wheezes or rales. No edema or JVD.  Ears with retracted TM    Review of old Records:  Reviewed   normal    Review of old labs:  Lab Results   Component Value Date    TSH 1.027 06/01/2016     Lab Results   Component Value Date    WBC 8.49 10/08/2017    HGB 12.7 10/08/2017    HCT 36.8 (L) 10/08/2017    MCV 91 10/08/2017     10/08/2017       Chemistry        Component Value Date/Time     10/08/2017 0545    K 3.5 10/08/2017 0545     10/08/2017 0545    CO2 26 10/08/2017 0545    BUN 9 10/08/2017 0545    CREATININE 0.8 10/08/2017 0545     (H) 10/08/2017 0545        Component Value Date/Time    CALCIUM 8.9 10/08/2017 0545    ALKPHOS 92 10/08/2017 0545    AST 35 10/08/2017 0545    ALT 19 10/08/2017 0545    BILITOT 0.5 10/08/2017 0545    ESTGFRAFRICA >60 10/08/2017 0545    EGFRNONAA >60 10/08/2017 0545        Lab Results   Component Value Date    CHOL 218 (H) 06/01/2016     Lab Results   Component Value Date    HDL 59 06/01/2016     Lab Results   Component Value Date    LDLCALC 130.0 06/01/2016     Lab Results   Component Value Date    TRIG 145 06/01/2016     Lab Results   Component Value Date    CHOLHDL 27.1 06/01/2016         Review of old imaging:  n/a    ASSESSMENT:  Problem List Items Addressed This Visit     Allergic rhinitis    ADD (attention deficit disorder) - Primary    Relevant Medications    dextroamphetamine-amphetamine " (ADDERALL XR) 20 MG 24 hr capsule    dextroamphetamine-amphetamine (ADDERALL XR) 20 MG 24 hr capsule (Start on 4/9/2018)    dextroamphetamine-amphetamine (ADDERALL XR) 20 MG 24 hr capsule (Start on 5/6/2018)          ICD-10-CM ICD-9-CM   1. Attention deficit disorder, unspecified hyperactivity presence F98.8 314.00   2. Chronic nonseasonal allergic rhinitis due to other allergen J30.89 477.8         PLAN:  1. Attention deficit disorder, unspecified hyperactivity presence  The current medical regimen is effective;  continue present plan and medications.  Went over schedule II responsibilities, including abuse, side effects, refill restrictions, necessary visits, drug testing, protection of medication.  Patient voices understanding.    - dextroamphetamine-amphetamine (ADDERALL XR) 20 MG 24 hr capsule; Take 1 capsule (20 mg total) by mouth every morning.  Dispense: 30 capsule; Refill: 0  - dextroamphetamine-amphetamine (ADDERALL XR) 20 MG 24 hr capsule; Take 1 capsule (20 mg total) by mouth every morning.  Dispense: 30 capsule; Refill: 0  - dextroamphetamine-amphetamine (ADDERALL XR) 20 MG 24 hr capsule; Take 1 capsule (20 mg total) by mouth every morning.  Dispense: 30 capsule; Refill: 0    2. Chronic nonseasonal allergic rhinitis due to other allergen  astelin  Potential medication side effects were discussed with the patient; let me know if any occur.        Medication List with Changes/Refills   New Medications    AZELASTINE (ASTELIN) 137 MCG (0.1 %) NASAL SPRAY    1 spray (137 mcg total) by Nasal route 2 (two) times daily.    DEXTROAMPHETAMINE-AMPHETAMINE (ADDERALL XR) 20 MG 24 HR CAPSULE    Take 1 capsule (20 mg total) by mouth every morning.    DEXTROAMPHETAMINE-AMPHETAMINE (ADDERALL XR) 20 MG 24 HR CAPSULE    Take 1 capsule (20 mg total) by mouth every morning.   Changed and/or Refilled Medications    Modified Medication Previous Medication    DEXTROAMPHETAMINE-AMPHETAMINE (ADDERALL XR) 20 MG 24 HR CAPSULE  dextroamphetamine-amphetamine (ADDERALL XR) 20 MG 24 hr capsule       Take 1 capsule (20 mg total) by mouth every morning.    Take 1 capsule (20 mg total) by mouth every morning.       No Follow-up on file.

## 2022-04-29 PROBLEM — R41.0 INTERMITTENT CONFUSION: Status: ACTIVE | Noted: 2022-04-29

## 2022-04-29 PROBLEM — R33.9 URINARY RETENTION: Status: ACTIVE | Noted: 2022-04-29

## 2022-04-29 LAB
ALBUMIN SERPL BCP-MCNC: 3.6 G/DL (ref 3.5–5.2)
ALP SERPL-CCNC: 59 U/L (ref 55–135)
ALT SERPL W/O P-5'-P-CCNC: 23 U/L (ref 10–44)
ANION GAP SERPL CALC-SCNC: 11 MMOL/L (ref 8–16)
ANION GAP SERPL CALC-SCNC: 9 MMOL/L (ref 8–16)
APTT BLDCRRT: 33 SEC (ref 21–32)
APTT BLDCRRT: 33.1 SEC (ref 21–32)
ASCENDING AORTA: 3.72 CM
AST SERPL-CCNC: 22 U/L (ref 10–40)
AV INDEX (PROSTH): 0.89
AV MEAN GRADIENT: 4 MMHG
AV PEAK GRADIENT: 8 MMHG
AV VALVE AREA: 2.84 CM2
AV VELOCITY RATIO: 0.62
BACTERIA #/AREA URNS AUTO: NORMAL /HPF
BACTERIA #/AREA URNS AUTO: NORMAL /HPF
BASOPHILS # BLD AUTO: 0.01 K/UL (ref 0–0.2)
BASOPHILS NFR BLD: 0.1 % (ref 0–1.9)
BILIRUB SERPL-MCNC: 0.3 MG/DL (ref 0.1–1)
BILIRUB UR QL STRIP: NEGATIVE
BSA FOR ECHO PROCEDURE: 1.91 M2
BUN SERPL-MCNC: 6 MG/DL (ref 8–23)
BUN SERPL-MCNC: 8 MG/DL (ref 8–23)
CALCIUM SERPL-MCNC: 8.4 MG/DL (ref 8.7–10.5)
CALCIUM SERPL-MCNC: 8.6 MG/DL (ref 8.7–10.5)
CHLORIDE SERPL-SCNC: 107 MMOL/L (ref 95–110)
CHLORIDE SERPL-SCNC: 113 MMOL/L (ref 95–110)
CK MB SERPL-MCNC: 4.6 NG/ML (ref 0.1–6.5)
CK MB SERPL-RTO: 1.9 % (ref 0–5)
CK SERPL-CCNC: 244 U/L (ref 20–200)
CLARITY UR REFRACT.AUTO: CLEAR
CO2 SERPL-SCNC: 26 MMOL/L (ref 23–29)
CO2 SERPL-SCNC: 28 MMOL/L (ref 23–29)
COLOR UR AUTO: ABNORMAL
COLOR UR AUTO: COLORLESS
COLOR UR AUTO: COLORLESS
COLOR UR AUTO: YELLOW
CREAT SERPL-MCNC: 0.8 MG/DL (ref 0.5–1.4)
CREAT SERPL-MCNC: 0.8 MG/DL (ref 0.5–1.4)
CV ECHO LV RWT: 0.38 CM
DIFFERENTIAL METHOD: ABNORMAL
DOP CALC AO PEAK VEL: 1.37 M/S
DOP CALC AO VTI: 27.67 CM
DOP CALC LVOT AREA: 3.2 CM2
DOP CALC LVOT DIAMETER: 2.02 CM
DOP CALC LVOT PEAK VEL: 0.85 M/S
DOP CALC LVOT STROKE VOLUME: 78.67 CM3
DOP CALCLVOT PEAK VEL VTI: 24.56 CM
E WAVE DECELERATION TIME: 249.44 MSEC
E/A RATIO: 0.92
E/E' RATIO: 9.3 M/S
ECHO LV POSTERIOR WALL: 0.89 CM (ref 0.6–1.1)
EJECTION FRACTION: 65 %
EOSINOPHIL # BLD AUTO: 0 K/UL (ref 0–0.5)
EOSINOPHIL NFR BLD: 0 % (ref 0–8)
ERYTHROCYTE [DISTWIDTH] IN BLOOD BY AUTOMATED COUNT: 15.2 % (ref 11.5–14.5)
EST. GFR  (AFRICAN AMERICAN): >60 ML/MIN/1.73 M^2
EST. GFR  (AFRICAN AMERICAN): >60 ML/MIN/1.73 M^2
EST. GFR  (NON AFRICAN AMERICAN): >60 ML/MIN/1.73 M^2
EST. GFR  (NON AFRICAN AMERICAN): >60 ML/MIN/1.73 M^2
FRACTIONAL SHORTENING: 37 % (ref 28–44)
GLUCOSE SERPL-MCNC: 158 MG/DL (ref 70–110)
GLUCOSE SERPL-MCNC: 172 MG/DL (ref 70–110)
GLUCOSE UR QL STRIP: ABNORMAL
GLUCOSE UR QL STRIP: NEGATIVE
HCT VFR BLD AUTO: 36.2 % (ref 40–54)
HGB BLD-MCNC: 11.6 G/DL (ref 14–18)
HGB UR QL STRIP: ABNORMAL
HYALINE CASTS UR QL AUTO: 3 /LPF
IMM GRANULOCYTES # BLD AUTO: 0.05 K/UL (ref 0–0.04)
IMM GRANULOCYTES NFR BLD AUTO: 0.5 % (ref 0–0.5)
INR PPP: 1.3 (ref 0.8–1.2)
INTERVENTRICULAR SEPTUM: 0.9 CM (ref 0.6–1.1)
KETONES UR QL STRIP: NEGATIVE
LA MAJOR: 5.25 CM
LA MINOR: 5.37 CM
LA WIDTH: 3.55 CM
LEFT ATRIUM SIZE: 3.24 CM
LEFT ATRIUM VOLUME INDEX: 27.3 ML/M2
LEFT ATRIUM VOLUME: 51.91 CM3
LEFT INTERNAL DIMENSION IN SYSTOLE: 2.97 CM (ref 2.1–4)
LEFT VENTRICLE DIASTOLIC VOLUME INDEX: 30.62 ML/M2
LEFT VENTRICLE DIASTOLIC VOLUME: 58.17 ML
LEFT VENTRICLE MASS INDEX: 75 G/M2
LEFT VENTRICLE SYSTOLIC VOLUME INDEX: 17.9 ML/M2
LEFT VENTRICLE SYSTOLIC VOLUME: 34.08 ML
LEFT VENTRICULAR INTERNAL DIMENSION IN DIASTOLE: 4.7 CM (ref 3.5–6)
LEFT VENTRICULAR MASS: 141.65 G
LEUKOCYTE ESTERASE UR QL STRIP: NEGATIVE
LV LATERAL E/E' RATIO: 8.45 M/S
LV SEPTAL E/E' RATIO: 10.33 M/S
LYMPHOCYTES # BLD AUTO: 0.9 K/UL (ref 1–4.8)
LYMPHOCYTES NFR BLD: 7.8 % (ref 18–48)
MAGNESIUM SERPL-MCNC: 2.2 MG/DL (ref 1.6–2.6)
MCH RBC QN AUTO: 27.2 PG (ref 27–31)
MCHC RBC AUTO-ENTMCNC: 32 G/DL (ref 32–36)
MCV RBC AUTO: 85 FL (ref 82–98)
MICROSCOPIC COMMENT: ABNORMAL
MICROSCOPIC COMMENT: ABNORMAL
MICROSCOPIC COMMENT: NORMAL
MICROSCOPIC COMMENT: NORMAL
MONOCYTES # BLD AUTO: 0.1 K/UL (ref 0.3–1)
MONOCYTES NFR BLD: 1 % (ref 4–15)
MV PEAK A VEL: 1.01 M/S
MV PEAK E VEL: 0.93 M/S
MV STENOSIS PRESSURE HALF TIME: 72.34 MS
MV VALVE AREA P 1/2 METHOD: 3.04 CM2
NEUTROPHILS # BLD AUTO: 10.1 K/UL (ref 1.8–7.7)
NEUTROPHILS NFR BLD: 90.6 % (ref 38–73)
NITRITE UR QL STRIP: NEGATIVE
NRBC BLD-RTO: 0 /100 WBC
OSMOLALITY SERPL: 318 MOSM/KG (ref 280–300)
OSMOLALITY UR: 235 MOSM/KG (ref 50–1200)
PH UR STRIP: 5 [PH] (ref 5–8)
PH UR STRIP: 7 [PH] (ref 5–8)
PHOSPHATE SERPL-MCNC: 1.8 MG/DL (ref 2.7–4.5)
PLATELET # BLD AUTO: 244 K/UL (ref 150–450)
PMV BLD AUTO: 9.8 FL (ref 9.2–12.9)
POCT GLUCOSE: 106 MG/DL (ref 70–110)
POCT GLUCOSE: 191 MG/DL (ref 70–110)
POCT GLUCOSE: 280 MG/DL (ref 70–110)
POCT GLUCOSE: 341 MG/DL (ref 70–110)
POTASSIUM SERPL-SCNC: 3.9 MMOL/L (ref 3.5–5.1)
POTASSIUM SERPL-SCNC: 4.2 MMOL/L (ref 3.5–5.1)
PROT SERPL-MCNC: 6.5 G/DL (ref 6–8.4)
PROT UR QL STRIP: NEGATIVE
PROTHROMBIN TIME: 13.5 SEC (ref 9–12.5)
RA MAJOR: 4.64 CM
RA PRESSURE: 3 MMHG
RA WIDTH: 3.05 CM
RBC # BLD AUTO: 4.26 M/UL (ref 4.6–6.2)
RBC #/AREA URNS AUTO: 1 /HPF (ref 0–4)
RBC #/AREA URNS AUTO: 11 /HPF (ref 0–4)
RBC #/AREA URNS AUTO: 3 /HPF (ref 0–4)
RBC #/AREA URNS AUTO: >100 /HPF (ref 0–4)
RIGHT VENTRICULAR END-DIASTOLIC DIMENSION: 3.32 CM
SINUS: 3 CM
SODIUM SERPL-SCNC: 144 MMOL/L (ref 136–145)
SODIUM SERPL-SCNC: 147 MMOL/L (ref 136–145)
SODIUM SERPL-SCNC: 149 MMOL/L (ref 136–145)
SODIUM SERPL-SCNC: 150 MMOL/L (ref 136–145)
SODIUM SERPL-SCNC: 151 MMOL/L (ref 136–145)
SODIUM SERPL-SCNC: 152 MMOL/L (ref 136–145)
SODIUM SERPL-SCNC: 154 MMOL/L (ref 136–145)
SODIUM UR-SCNC: 40 MMOL/L (ref 20–250)
SP GR UR STRIP: 1 (ref 1–1.03)
SP GR UR STRIP: 1 (ref 1–1.03)
SP GR UR STRIP: 1.01 (ref 1–1.03)
SP GR UR STRIP: 1.02 (ref 1–1.03)
SQUAMOUS #/AREA URNS AUTO: 0 /HPF
SQUAMOUS #/AREA URNS AUTO: 0 /HPF
STJ: 3.47 CM
TDI LATERAL: 0.11 M/S
TDI SEPTAL: 0.09 M/S
TDI: 0.1 M/S
TRICUSPID ANNULAR PLANE SYSTOLIC EXCURSION: 2.72 CM
TROPONIN I SERPL DL<=0.01 NG/ML-MCNC: <0.006 NG/ML (ref 0–0.03)
URN SPEC COLLECT METH UR: ABNORMAL
WBC # BLD AUTO: 11.09 K/UL (ref 3.9–12.7)
WBC #/AREA URNS AUTO: 1 /HPF (ref 0–5)
WBC #/AREA URNS AUTO: 1 /HPF (ref 0–5)
WBC #/AREA URNS AUTO: 3 /HPF (ref 0–5)

## 2022-04-29 PROCEDURE — 63600175 PHARM REV CODE 636 W HCPCS: Performed by: PHYSICIAN ASSISTANT

## 2022-04-29 PROCEDURE — 99223 PR INITIAL HOSPITAL CARE,LEVL III: ICD-10-PCS | Mod: ,,, | Performed by: INTERNAL MEDICINE

## 2022-04-29 PROCEDURE — 83930 ASSAY OF BLOOD OSMOLALITY: CPT | Performed by: PHYSICIAN ASSISTANT

## 2022-04-29 PROCEDURE — 83935 ASSAY OF URINE OSMOLALITY: CPT | Performed by: PHYSICIAN ASSISTANT

## 2022-04-29 PROCEDURE — 84300 ASSAY OF URINE SODIUM: CPT | Performed by: PHYSICIAN ASSISTANT

## 2022-04-29 PROCEDURE — 93005 ELECTROCARDIOGRAM TRACING: CPT

## 2022-04-29 PROCEDURE — 94761 N-INVAS EAR/PLS OXIMETRY MLT: CPT

## 2022-04-29 PROCEDURE — 25000003 PHARM REV CODE 250: Performed by: NURSE PRACTITIONER

## 2022-04-29 PROCEDURE — 93010 ELECTROCARDIOGRAM REPORT: CPT | Mod: ,,, | Performed by: INTERNAL MEDICINE

## 2022-04-29 PROCEDURE — 84295 ASSAY OF SERUM SODIUM: CPT | Mod: 91 | Performed by: NURSE PRACTITIONER

## 2022-04-29 PROCEDURE — 25000242 PHARM REV CODE 250 ALT 637 W/ HCPCS: Performed by: NURSE PRACTITIONER

## 2022-04-29 PROCEDURE — 63700000 PHARM REV CODE 250 ALT 637 W/O HCPCS: Performed by: NURSE PRACTITIONER

## 2022-04-29 PROCEDURE — 99223 PR INITIAL HOSPITAL CARE,LEVL III: ICD-10-PCS | Mod: GC,,, | Performed by: INTERNAL MEDICINE

## 2022-04-29 PROCEDURE — 85730 THROMBOPLASTIN TIME PARTIAL: CPT | Mod: 91 | Performed by: HOSPITALIST

## 2022-04-29 PROCEDURE — 85025 COMPLETE CBC W/AUTO DIFF WBC: CPT | Performed by: PHYSICIAN ASSISTANT

## 2022-04-29 PROCEDURE — 25000003 PHARM REV CODE 250: Performed by: PHYSICIAN ASSISTANT

## 2022-04-29 PROCEDURE — 25000003 PHARM REV CODE 250: Performed by: PSYCHIATRY & NEUROLOGY

## 2022-04-29 PROCEDURE — 85730 THROMBOPLASTIN TIME PARTIAL: CPT | Performed by: PHYSICIAN ASSISTANT

## 2022-04-29 PROCEDURE — 99223 1ST HOSP IP/OBS HIGH 75: CPT | Mod: ,,, | Performed by: INTERNAL MEDICINE

## 2022-04-29 PROCEDURE — 25000242 PHARM REV CODE 250 ALT 637 W/ HCPCS: Performed by: PHYSICIAN ASSISTANT

## 2022-04-29 PROCEDURE — 99900035 HC TECH TIME PER 15 MIN (STAT)

## 2022-04-29 PROCEDURE — 94640 AIRWAY INHALATION TREATMENT: CPT

## 2022-04-29 PROCEDURE — 63600175 PHARM REV CODE 636 W HCPCS: Mod: JG | Performed by: NURSE PRACTITIONER

## 2022-04-29 PROCEDURE — 63600175 PHARM REV CODE 636 W HCPCS: Performed by: STUDENT IN AN ORGANIZED HEALTH CARE EDUCATION/TRAINING PROGRAM

## 2022-04-29 PROCEDURE — 25000003 PHARM REV CODE 250: Performed by: HOSPITALIST

## 2022-04-29 PROCEDURE — 93010 EKG 12-LEAD: ICD-10-PCS | Mod: ,,, | Performed by: INTERNAL MEDICINE

## 2022-04-29 PROCEDURE — 85610 PROTHROMBIN TIME: CPT | Performed by: PHYSICIAN ASSISTANT

## 2022-04-29 PROCEDURE — 99223 1ST HOSP IP/OBS HIGH 75: CPT | Mod: GC,,, | Performed by: INTERNAL MEDICINE

## 2022-04-29 PROCEDURE — 99233 SBSQ HOSP IP/OBS HIGH 50: CPT | Mod: ,,, | Performed by: NURSE PRACTITIONER

## 2022-04-29 PROCEDURE — 92610 EVALUATE SWALLOWING FUNCTION: CPT

## 2022-04-29 PROCEDURE — 80053 COMPREHEN METABOLIC PANEL: CPT | Performed by: PHYSICIAN ASSISTANT

## 2022-04-29 PROCEDURE — 99233 PR SUBSEQUENT HOSPITAL CARE,LEVL III: ICD-10-PCS | Mod: ,,, | Performed by: NURSE PRACTITIONER

## 2022-04-29 PROCEDURE — 84484 ASSAY OF TROPONIN QUANT: CPT | Performed by: HOSPITALIST

## 2022-04-29 PROCEDURE — S0166 INJ OLANZAPINE 2.5MG: HCPCS | Performed by: PHYSICIAN ASSISTANT

## 2022-04-29 PROCEDURE — 97535 SELF CARE MNGMENT TRAINING: CPT

## 2022-04-29 PROCEDURE — 27000221 HC OXYGEN, UP TO 24 HOURS

## 2022-04-29 PROCEDURE — 82553 CREATINE MB FRACTION: CPT | Performed by: PHYSICIAN ASSISTANT

## 2022-04-29 PROCEDURE — 81001 URINALYSIS AUTO W/SCOPE: CPT | Mod: 91 | Performed by: PHYSICIAN ASSISTANT

## 2022-04-29 PROCEDURE — 83735 ASSAY OF MAGNESIUM: CPT | Performed by: PHYSICIAN ASSISTANT

## 2022-04-29 PROCEDURE — 20000000 HC ICU ROOM

## 2022-04-29 PROCEDURE — 63600175 PHARM REV CODE 636 W HCPCS: Performed by: PSYCHIATRY & NEUROLOGY

## 2022-04-29 PROCEDURE — 84295 ASSAY OF SERUM SODIUM: CPT | Mod: 91 | Performed by: PHYSICIAN ASSISTANT

## 2022-04-29 PROCEDURE — 84100 ASSAY OF PHOSPHORUS: CPT | Performed by: PHYSICIAN ASSISTANT

## 2022-04-29 PROCEDURE — 92523 SPEECH SOUND LANG COMPREHEN: CPT

## 2022-04-29 PROCEDURE — 25000003 PHARM REV CODE 250: Performed by: STUDENT IN AN ORGANIZED HEALTH CARE EDUCATION/TRAINING PROGRAM

## 2022-04-29 RX ORDER — QUETIAPINE FUMARATE 25 MG/1
25 TABLET, FILM COATED ORAL NIGHTLY
Status: DISCONTINUED | OUTPATIENT
Start: 2022-04-30 | End: 2022-05-01

## 2022-04-29 RX ORDER — PREGABALIN 75 MG/1
75 CAPSULE ORAL 3 TIMES DAILY
Status: DISCONTINUED | OUTPATIENT
Start: 2022-04-30 | End: 2022-04-30

## 2022-04-29 RX ORDER — MAGNESIUM SULFATE HEPTAHYDRATE 40 MG/ML
2 INJECTION, SOLUTION INTRAVENOUS
Status: DISCONTINUED | OUTPATIENT
Start: 2022-04-29 | End: 2022-05-03

## 2022-04-29 RX ORDER — CALCIUM GLUCONATE 20 MG/ML
2 INJECTION, SOLUTION INTRAVENOUS
Status: DISCONTINUED | OUTPATIENT
Start: 2022-04-29 | End: 2022-05-03

## 2022-04-29 RX ORDER — METHOCARBAMOL 500 MG/1
500 TABLET, FILM COATED ORAL EVERY 6 HOURS
Status: DISCONTINUED | OUTPATIENT
Start: 2022-04-29 | End: 2022-05-03

## 2022-04-29 RX ORDER — DESMOPRESSIN ACETATE 4 UG/ML
0.25 INJECTION, SOLUTION INTRAVENOUS; SUBCUTANEOUS ONCE
Status: COMPLETED | OUTPATIENT
Start: 2022-04-29 | End: 2022-04-29

## 2022-04-29 RX ORDER — GLUCAGON 1 MG
1 KIT INJECTION
Status: DISCONTINUED | OUTPATIENT
Start: 2022-04-29 | End: 2022-05-13 | Stop reason: HOSPADM

## 2022-04-29 RX ORDER — TALC
6 POWDER (GRAM) TOPICAL NIGHTLY PRN
Status: DISCONTINUED | OUTPATIENT
Start: 2022-04-29 | End: 2022-05-06

## 2022-04-29 RX ORDER — SODIUM CHLORIDE, SODIUM LACTATE, POTASSIUM CHLORIDE, CALCIUM CHLORIDE 600; 310; 30; 20 MG/100ML; MG/100ML; MG/100ML; MG/100ML
INJECTION, SOLUTION INTRAVENOUS CONTINUOUS
Status: DISCONTINUED | OUTPATIENT
Start: 2022-04-29 | End: 2022-04-29

## 2022-04-29 RX ORDER — QUETIAPINE FUMARATE 25 MG/1
25 TABLET, FILM COATED ORAL NIGHTLY PRN
Status: DISCONTINUED | OUTPATIENT
Start: 2022-04-29 | End: 2022-04-29

## 2022-04-29 RX ORDER — HEPARIN SODIUM 5000 [USP'U]/ML
5000 INJECTION, SOLUTION INTRAVENOUS; SUBCUTANEOUS EVERY 8 HOURS
Status: DISCONTINUED | OUTPATIENT
Start: 2022-04-30 | End: 2022-05-13 | Stop reason: HOSPADM

## 2022-04-29 RX ORDER — MIDAZOLAM HYDROCHLORIDE 1 MG/ML
INJECTION INTRAMUSCULAR; INTRAVENOUS
Status: COMPLETED
Start: 2022-04-29 | End: 2022-04-29

## 2022-04-29 RX ORDER — FAMOTIDINE 20 MG/1
20 TABLET, FILM COATED ORAL 2 TIMES DAILY
Status: DISCONTINUED | OUTPATIENT
Start: 2022-04-29 | End: 2022-05-13 | Stop reason: HOSPADM

## 2022-04-29 RX ORDER — QUETIAPINE FUMARATE 25 MG/1
25 TABLET, FILM COATED ORAL ONCE
Status: COMPLETED | OUTPATIENT
Start: 2022-04-29 | End: 2022-04-29

## 2022-04-29 RX ORDER — POTASSIUM CHLORIDE 7.45 MG/ML
80 INJECTION INTRAVENOUS
Status: DISCONTINUED | OUTPATIENT
Start: 2022-04-29 | End: 2022-05-03

## 2022-04-29 RX ORDER — MAGNESIUM SULFATE HEPTAHYDRATE 40 MG/ML
2 INJECTION, SOLUTION INTRAVENOUS ONCE
Status: COMPLETED | OUTPATIENT
Start: 2022-04-29 | End: 2022-04-29

## 2022-04-29 RX ORDER — SODIUM CHLORIDE 450 MG/100ML
INJECTION, SOLUTION INTRAVENOUS CONTINUOUS
Status: DISCONTINUED | OUTPATIENT
Start: 2022-04-29 | End: 2022-04-30

## 2022-04-29 RX ORDER — MAGNESIUM SULFATE HEPTAHYDRATE 40 MG/ML
4 INJECTION, SOLUTION INTRAVENOUS
Status: DISCONTINUED | OUTPATIENT
Start: 2022-04-29 | End: 2022-05-03

## 2022-04-29 RX ORDER — OLANZAPINE 10 MG/2ML
5 INJECTION, POWDER, FOR SOLUTION INTRAMUSCULAR ONCE
Status: COMPLETED | OUTPATIENT
Start: 2022-04-29 | End: 2022-04-29

## 2022-04-29 RX ORDER — POTASSIUM CHLORIDE 7.45 MG/ML
40 INJECTION INTRAVENOUS
Status: DISCONTINUED | OUTPATIENT
Start: 2022-04-29 | End: 2022-05-03

## 2022-04-29 RX ORDER — INSULIN ASPART 100 [IU]/ML
0-5 INJECTION, SOLUTION INTRAVENOUS; SUBCUTANEOUS EVERY 6 HOURS PRN
Status: DISCONTINUED | OUTPATIENT
Start: 2022-04-29 | End: 2022-04-29

## 2022-04-29 RX ORDER — POTASSIUM CHLORIDE 7.45 MG/ML
60 INJECTION INTRAVENOUS
Status: DISCONTINUED | OUTPATIENT
Start: 2022-04-29 | End: 2022-05-03

## 2022-04-29 RX ORDER — CALCIUM GLUCONATE 20 MG/ML
3 INJECTION, SOLUTION INTRAVENOUS
Status: DISCONTINUED | OUTPATIENT
Start: 2022-04-29 | End: 2022-05-03

## 2022-04-29 RX ORDER — INSULIN ASPART 100 [IU]/ML
0-10 INJECTION, SOLUTION INTRAVENOUS; SUBCUTANEOUS EVERY 6 HOURS PRN
Status: DISCONTINUED | OUTPATIENT
Start: 2022-04-29 | End: 2022-05-13

## 2022-04-29 RX ORDER — CALCIUM GLUCONATE 20 MG/ML
1 INJECTION, SOLUTION INTRAVENOUS
Status: DISCONTINUED | OUTPATIENT
Start: 2022-04-29 | End: 2022-05-03

## 2022-04-29 RX ADMIN — METHOCARBAMOL TABLETS 500 MG: 500 TABLET, COATED ORAL at 12:04

## 2022-04-29 RX ADMIN — SODIUM CHLORIDE, SODIUM LACTATE, POTASSIUM CHLORIDE, AND CALCIUM CHLORIDE 1000 ML: .6; .31; .03; .02 INJECTION, SOLUTION INTRAVENOUS at 10:04

## 2022-04-29 RX ADMIN — MAGNESIUM SULFATE 2 G: 2 INJECTION INTRAVENOUS at 02:04

## 2022-04-29 RX ADMIN — METHOCARBAMOL 500 MG: 100 INJECTION, SOLUTION INTRAMUSCULAR; INTRAVENOUS at 12:04

## 2022-04-29 RX ADMIN — FENTANYL CITRATE 25 MCG: 50 INJECTION INTRAMUSCULAR; INTRAVENOUS at 01:04

## 2022-04-29 RX ADMIN — INSULIN ASPART 3 UNITS: 100 INJECTION, SOLUTION INTRAVENOUS; SUBCUTANEOUS at 07:04

## 2022-04-29 RX ADMIN — PREGABALIN 50 MG: 50 CAPSULE ORAL at 09:04

## 2022-04-29 RX ADMIN — SODIUM CHLORIDE, SODIUM LACTATE, POTASSIUM CHLORIDE, AND CALCIUM CHLORIDE 1000 ML: .6; .31; .03; .02 INJECTION, SOLUTION INTRAVENOUS at 05:04

## 2022-04-29 RX ADMIN — HYDROMORPHONE HYDROCHLORIDE 1 MG: 1 INJECTION, SOLUTION INTRAMUSCULAR; INTRAVENOUS; SUBCUTANEOUS at 03:04

## 2022-04-29 RX ADMIN — PREGABALIN 50 MG: 50 CAPSULE ORAL at 08:04

## 2022-04-29 RX ADMIN — DEXAMETHASONE SODIUM PHOSPHATE 4 MG: 4 INJECTION INTRA-ARTICULAR; INTRALESIONAL; INTRAMUSCULAR; INTRAVENOUS; SOFT TISSUE at 06:04

## 2022-04-29 RX ADMIN — MUPIROCIN: 20 OINTMENT TOPICAL at 08:04

## 2022-04-29 RX ADMIN — FAMOTIDINE 20 MG: 20 TABLET ORAL at 09:04

## 2022-04-29 RX ADMIN — SILODOSIN 4 MG: 4 CAPSULE ORAL at 08:04

## 2022-04-29 RX ADMIN — CEFAZOLIN SODIUM 1 G: 1 SOLUTION INTRAVENOUS at 07:04

## 2022-04-29 RX ADMIN — DEXAMETHASONE SODIUM PHOSPHATE 4 MG: 4 INJECTION INTRA-ARTICULAR; INTRALESIONAL; INTRAMUSCULAR; INTRAVENOUS; SOFT TISSUE at 12:04

## 2022-04-29 RX ADMIN — AZITHROMYCIN MONOHYDRATE 500 MG: 250 TABLET ORAL at 08:04

## 2022-04-29 RX ADMIN — IPRATROPIUM BROMIDE AND ALBUTEROL SULFATE 3 ML: 2.5; .5 SOLUTION RESPIRATORY (INHALATION) at 07:04

## 2022-04-29 RX ADMIN — ETHAMBUTOL HYDROCHLORIDE 800 MG: 400 TABLET, FILM COATED ORAL at 12:04

## 2022-04-29 RX ADMIN — ACETAMINOPHEN 1000 MG: 500 TABLET ORAL at 09:04

## 2022-04-29 RX ADMIN — PREGABALIN 50 MG: 50 CAPSULE ORAL at 04:04

## 2022-04-29 RX ADMIN — METHOCARBAMOL TABLETS 500 MG: 500 TABLET, COATED ORAL at 06:04

## 2022-04-29 RX ADMIN — DEXAMETHASONE SODIUM PHOSPHATE 4 MG: 4 INJECTION INTRA-ARTICULAR; INTRALESIONAL; INTRAMUSCULAR; INTRAVENOUS; SOFT TISSUE at 11:04

## 2022-04-29 RX ADMIN — SODIUM CHLORIDE, SODIUM LACTATE, POTASSIUM CHLORIDE, AND CALCIUM CHLORIDE 1000 ML: .6; .31; .03; .02 INJECTION, SOLUTION INTRAVENOUS at 04:04

## 2022-04-29 RX ADMIN — DEXAMETHASONE SODIUM PHOSPHATE 4 MG: 4 INJECTION INTRA-ARTICULAR; INTRALESIONAL; INTRAMUSCULAR; INTRAVENOUS; SOFT TISSUE at 05:04

## 2022-04-29 RX ADMIN — METHOCARBAMOL TABLETS 500 MG: 500 TABLET, COATED ORAL at 11:04

## 2022-04-29 RX ADMIN — SODIUM CHLORIDE SOLN NEBU 3% 4 ML: 3 NEBU SOLN at 07:04

## 2022-04-29 RX ADMIN — ACETAMINOPHEN 1000 MG: 500 TABLET ORAL at 02:04

## 2022-04-29 RX ADMIN — SENNOSIDES AND DOCUSATE SODIUM 1 TABLET: 50; 8.6 TABLET ORAL at 08:04

## 2022-04-29 RX ADMIN — MUPIROCIN: 20 OINTMENT TOPICAL at 09:04

## 2022-04-29 RX ADMIN — IPRATROPIUM BROMIDE AND ALBUTEROL SULFATE 3 ML: 2.5; .5 SOLUTION RESPIRATORY (INHALATION) at 01:04

## 2022-04-29 RX ADMIN — SODIUM CHLORIDE: 0.45 INJECTION, SOLUTION INTRAVENOUS at 07:04

## 2022-04-29 RX ADMIN — OLANZAPINE 5 MG: 10 INJECTION, POWDER, LYOPHILIZED, FOR SOLUTION INTRAMUSCULAR at 03:04

## 2022-04-29 RX ADMIN — METHOCARBAMOL 500 MG: 100 INJECTION, SOLUTION INTRAMUSCULAR; INTRAVENOUS at 05:04

## 2022-04-29 RX ADMIN — CEFAZOLIN SODIUM 1 G: 1 SOLUTION INTRAVENOUS at 03:04

## 2022-04-29 RX ADMIN — SODIUM CHLORIDE, SODIUM LACTATE, POTASSIUM CHLORIDE, AND CALCIUM CHLORIDE 1000 ML: .6; .31; .03; .02 INJECTION, SOLUTION INTRAVENOUS at 06:04

## 2022-04-29 RX ADMIN — SENNOSIDES AND DOCUSATE SODIUM 1 TABLET: 50; 8.6 TABLET ORAL at 09:04

## 2022-04-29 RX ADMIN — FENTANYL CITRATE 25 MCG: 50 INJECTION INTRAMUSCULAR; INTRAVENOUS at 10:04

## 2022-04-29 RX ADMIN — FAMOTIDINE 20 MG: 10 INJECTION INTRAVENOUS at 08:04

## 2022-04-29 RX ADMIN — QUETIAPINE FUMARATE 25 MG: 25 TABLET ORAL at 10:04

## 2022-04-29 RX ADMIN — POLYETHYLENE GLYCOL 3350 17 G: 17 POWDER, FOR SOLUTION ORAL at 08:04

## 2022-04-29 RX ADMIN — DESMOPRESSIN ACETATE 0.25 MCG: 4 SOLUTION INTRAVENOUS at 11:04

## 2022-04-29 RX ADMIN — CEFAZOLIN SODIUM 1 G: 1 SOLUTION INTRAVENOUS at 12:04

## 2022-04-29 RX ADMIN — SODIUM CHLORIDE 500 ML: 0.9 INJECTION, SOLUTION INTRAVENOUS at 07:04

## 2022-04-29 RX ADMIN — DAPTOMYCIN 600 MG: 500 INJECTION, POWDER, LYOPHILIZED, FOR SOLUTION INTRAVENOUS at 06:04

## 2022-04-29 NOTE — CONSULTS
[facility] Neuro Critical Care  Infectious Disease  Consult Note    Patient Name: Diego Gunter  MRN: [id number]  Admission Date: 4/28/2022  Hospital Length of Stay: 1 days  Attending Physician: Donato Smith MD  Primary Care Provider: Portia Ferreira MD     Isolation Status: No active isolations    Patient information was obtained from spouse/SO, past medical records, ER records and primary team.      Inpatient consult to Infectious Diseases  Consult performed by: Marychuy Retana DO  Consult ordered by: Elida Lamb NP        Assessment/Plan:     * Epidural hematoma  81 year old male with acute R sided weakness found to have C3-C6 compressive fluid collection on MRI. Underwent spinal decompression and fluid evacuation with NSGY 4/28. ID consulted for management of epidural hematoma vs abscess.     · Discussed w/ NSGY, low suspicion for infectious process at this time.   · No OR cultures were sent. Hematoma sent for path   · Will start daptomycin empirically to cover most common organisms empirically   · If path does not show evidence of infectious process, discontinue abx   · If path is concerning for infection, please re-consult         HILLARY (mycobacterium avium-intracellulare)  · Diagnosed in 2018, follows in clinic with Dr. Rao. Last AFB 3/24 +mycobacterium avium   · continue ethambutol and azithromycin   · continue saline nebs BID  · will continue to be followed outpatient     Thank you for your consult. I will sign off. Please contact us if you have any additional questions.    Kaleigh Retana DO  Critical Care Infectious Disease     Time spent: 70 minutes   At least 50% of time was spent on face-to-face counseling and coordination of care. Counseling included discussion of test results, ongoing evaluation, diagnosis and treatment plan.      Subjective:     Principal Problem: Epidural hematoma    HPI: Mr. Gunter is a 81 year old male with PMH significant for MAC PNA, followed in clinic by   Maira, on ethambutol and azithromycin History also significant for antiphospholipid syndrome, PE on chronic AC and DM2. Was admitted from ED to neuro critical care for CTH showing compressive fluid collection from C3-C6 after presenting to ED with R sided hemiparesis. MRI of cervical spine confirming these findings. NSGY consulted for C3-C6 decompressive surgery. Primary team has patient on azithromycin and cefazolin for empiric treatment. Blood cultures pending. ID consulted for further workup and management of epidural abscess.               Past Medical History:   Diagnosis Date    Antiphospholipid syndrome 11/19/2021    Dysphagia     Hx of colonic polyps     followed by GI. Dr. Pham    Hyperlipidemia LDL goal <100     Overweight(278.02)     Prostate cancer september 2011    followed by urology, Dr. Rogers    Type II or unspecified type diabetes mellitus without mention of complication, not stated as uncontrolled     diet controlled       Past Surgical History:   Procedure Laterality Date    BRONCHOSCOPY N/A 10/15/2018    Procedure: BRONCHOSCOPY;  Surgeon: Pratibha Diagnostic Provider;  Location: [facility] OR [address] [place] [address];  Service: Anesthesiology;  Laterality: N/A;    CATARACT EXTRACTION, BILATERAL  2014    POSTERIOR CERVICAL LAMINECTOMY Bilateral 4/28/2022    Procedure: LAMINECTOMY, SPINE, CERVICAL, POSTERIOR APPROACH C3-6;  Surgeon: Carlos Miller MD;  Location: [facility] OR [address] [place] [address];  Service: Neurosurgery;  Laterality: Bilateral;    PROSTATECTOMY         Review of patient's allergies indicates:  No Known Allergies    Medications:  Medications Prior to Admission   Medication Sig    albuterol (PROVENTIL/VENTOLIN HFA) 90 mcg/actuation inhaler INHALE 2 PUFFS BY MOUTH EVERY 6 HOURS AS NEEDED FOR WHEEZING    ascorbic acid, vitamin C, (VITAMIN C) 1000 MG tablet Take 1,000 mg by mouth once daily.    aspirin (ECOTRIN) 81 MG EC tablet Take 81 mg by mouth.    b complex vitamins capsule Take 1 capsule by mouth once  daily.    ethambutoL (MYAMBUTOL) 400 MG Tab Take 2 tablets (800 mg total) by mouth once daily.    mucus clearing device (AEROBIKA OSCILLATING PEP SYSTM) by Misc.(Non-Drug; Combo Route) route 2 (two) times a day.    nebulizer and compressor ([employer] COMPRESSOR SYSTEM) Marcy use to administer nebulized medication as directed    omega 3-dha-epa-fish oil 1,000 (120-180) mg Cap Take 1 capsule by mouth once daily.    omega-3 fatty acids 1,000 mg Cap Take 2 g by mouth.    omeprazole 20 mg TbEC 1/2 tablet daily in am as needed.    pravastatin (PRAVACHOL) 10 MG tablet Take 1 tablet (10 mg total) by mouth once daily.    promethazine-dextromethorphan (PROMETHAZINE-DM) 6.25-15 mg/5 mL Syrp Take 10-15ml by mouth every 8 hours as needed for coughing    sodium chloride 3% 3 % nebulizer solution USE 4 ML VIAL IN NEBULIZER  TWICE DAILY    warfarin (COUMADIN) 3 MG tablet Take 1.5 tablets (4.5 mg total) by mouth Daily. As directed by coumadin clinic     Antibiotics (From admission, onward)                Start     Stop Route Frequency Ordered    04/29/22 0000  cefazolin 1g in dextrose 5% 50 mL IVPB (ready to mix system)         -- IV Every 8 hours (non-standard times) 04/28/22 2045 04/28/22 2100  mupirocin 2 % ointment         05/03 2059 Nasl 2 times daily 04/28/22 2048 04/28/22 0900  ethambutoL tablet 800 mg         -- Oral Daily 04/28/22 0601    04/28/22 0900  azithromycin tablet 500 mg         -- Oral Daily 04/28/22 0601          Antifungals (From admission, onward)                None          Antivirals (From admission, onward)      None             Immunization History   Administered Date(s) Administered    COVID-19, MRNA, LN-S, PF (Pfizer) (Purple Cap) 01/13/2021, 04/28/2021, 11/05/2021    Influenza (FLUAD) - Trivalent - Adjuvanted - PF (65+) 09/25/2019    Influenza - High Dose - PF (65 years and older) 09/18/2015, 10/04/2016, 10/23/2017, 09/21/2018    Influenza - Quadrivalent - High Dose - PF (65 years and  older) 2021    Influenza - Quadrivalent - PF *Preferred* (6 months and older) 10/06/2014    Influenza A (H1N1) 2009 Monovalent - IM 2010    Influenza Split 10/10/2011, 10/06/2014    Pneumococcal Conjugate - 13 Valent 10/28/2015    Pneumococcal Polysaccharide - 23 Valent 2013    Tdap 2015    Zoster Recombinant 2019, 2019       Family History       Problem Relation (Age of Onset)    Colon cancer Mother    Dementia Brother    Diabetes Mother, Sister, , Brother, Brother, Maternal Aunt    Heart disease Father    Lung cancer Brother    Mental illness Sister    Myasthenia gravis Brother    Other Brother    Parkinsonism Brother    Pulmonary fibrosis Brother          Social History     Socioeconomic History    Marital status:     Number of children: 2   Occupational History    Occupation: [employer]    Tobacco Use    Smoking status: Former Smoker     Packs/day: 0.25     Years: 5.00     Pack years: 1.25     Quit date: 10/2/1962     Years since quittin.6    Smokeless tobacco: Never Used    Tobacco comment: quit age 23   Substance and Sexual Activity    Alcohol use: Yes     Comment: beer occasionally    Drug use: No    Sexual activity: Yes     Partners: Female     Review of Systems   Unable to perform ROS: Mental status change   Objective:     Vital Signs (Most Recent):  Temp: 98.6 °F (37 °C) (22 1100)  Pulse: 78 (22 1359)  Resp: 16 (22 1359)  BP: (!) 146/65 (22 1300)  SpO2: 96 % (22 1359)   Vital Signs (24h Range):  Temp:  [94.5 °F (34.7 °C)-98.6 °F (37 °C)] 98.6 °F (37 °C)  Pulse:  [] 78  Resp:  [11-26] 16  SpO2:  [95 %-100 %] 96 %  BP: (103-152)/(56-89) 146/65  Arterial Line BP: (116-155)/(55-76) 146/64     Weight: 74.8 kg (165 lb)  Body mass index is 24.37 kg/m².    Estimated Creatinine Clearance: 74.9 mL/min (based on SCr of 0.8 mg/dL).    Physical Exam  Vitals and nursing note reviewed.   Constitutional:       General:  He is sleeping. He is not in acute distress.     Appearance: He is ill-appearing.      Interventions: Cervical collar and nasal cannula in place.   HENT:      Mouth/Throat:      Mouth: Mucous membranes are moist.      Pharynx: Oropharynx is clear.   Cardiovascular:      Rate and Rhythm: Normal rate and regular rhythm.      Pulses: Normal pulses.      Heart sounds: Normal heart sounds.   Pulmonary:      Effort: Pulmonary effort is normal. No respiratory distress.      Breath sounds: Normal breath sounds. No wheezing or rhonchi.   Abdominal:      General: Abdomen is protuberant. There is no distension.      Palpations: Abdomen is soft.      Tenderness: There is no abdominal tenderness.   Skin:     General: Skin is warm and dry.      Capillary Refill: Capillary refill takes less than 2 seconds.      Findings: Bruising present. No lesion or rash.   Neurological:      Comments: R sided weakness       Significant Labs: All pertinent labs within the past 24 hours have been reviewed.    Significant Imaging: I have reviewed all pertinent imaging results/findings within the past 24 hours.

## 2022-04-29 NOTE — ASSESSMENT & PLAN NOTE
Mr. Gunter was diagnosed with type IIb antiphospholipid antibody syndrome after he had a pulmonary embolism in the setting of COVID-19 infection. He had two labs done 5 months apart which confirmed anticardiolipin antibody presence. DRVVT was only positive once. He is now admitted with cervical epidural hematoma resulting in neurological deficits.     Given the fact that his only clot history was a provoked PE in the setting of COVID infection, and since he is consistently positive for cardiolipin antibody but not consistently positive for DRVVT, it would be reasonable to trial off of therapeutic anticoagulation. This does carry a risk of potential thrombus development.     Recommendations:  - will order repeat cardiolipin antibodies, beta 2 glycoprotein, and DRVVT to be drawn tomorrow morning, as these may help assist with risk stratification in the future. Note that DRVVT may be falsely elevated with recent warfarin administration  - we recommend not resuming therapeutic anticoagulation at this time in the context of his current bleeding event and history of only one clot which seems to have been provoked  - recommend prophylactic anticoagulation while inpatient if deemed to be safe by the neurosurgical team  - will follow up on repeat aPL labs and provide further recommendations, though at this time will recommend holding therapeutic anticoagulation until follow up with Dr. Pantoja

## 2022-04-29 NOTE — PROGRESS NOTES
Patient traveled to 2nd floor CT scan via bed connected to portable tele monitor with 2LPM O2 and ambubag at bedside. Traveled with RN x 1. Vitals stable during imaging and transport. Will continue to monitor.

## 2022-04-29 NOTE — ASSESSMENT & PLAN NOTE
"Per patient's wife and grandson bedside, pt has had periods of intermittent confusion and mumbling since being dx with MAC in 2018. They state every time he comes to the hospital, he appears much more confused and mumbles.     -Pt disoriented post op, repeating words told to him, mumbling, or saying "yes/no"; per family bedside, this is not unexpected behavior for him based on previous admissions.  -cont to monitor neuro checks q1h for improvement in mental state  "

## 2022-04-29 NOTE — PLAN OF CARE
Recommendations    1. Continue current texture-modified diet per SLP    - Assist with all meals/meal setup prn     2. If PO intake >50%, add Boost Pudding BID     3. RD to monitor and follow    Goals: Pt will meet and tolerate >75% EEN/EPN by RD f/u  Nutrition Goal Status: new    Communication of RD Recs:  (POC)    Michell Macedo MS, RD, LDN  Ext: [phone]

## 2022-04-29 NOTE — SUBJECTIVE & OBJECTIVE
Oncology Treatment Plan:   [Could not find a treatment plan. This SmartLink may be configured incorrectly. Contact a  for help.]    Medications:  Continuous Infusions:   sodium chloride 0.45% 125 mL/hr at 04/29/22 1200     Scheduled Meds:   acetaminophen  1,000 mg Oral Q8H    albuterol-ipratropium  3 mL Nebulization Q6H    azithromycin  500 mg Oral Daily    ceFAZolin (ANCEF) IVPB  1 g Intravenous Q8H    dexamethasone  4 mg Intravenous Q6H    ethambutoL  800 mg Oral Daily    famotidine (PF)  20 mg Intravenous BID    methocarbamoL  500 mg Oral Q6H    midazolam        mucus clearing device   Misc.(Non-Drug; Combo Route) BID    mupirocin   Nasal BID    nebulizer and compressor  1 Device Misc.(Non-Drug; Combo Route) BID    polyethylene glycol  17 g Oral Daily    pravastatin  10 mg Oral Daily    pregabalin  50 mg Oral TID    senna-docusate 8.6-50 mg  1 tablet Oral BID    silodosin  4 mg Oral Daily    sodium chloride 3%  4 mL Nebulization BID     PRN Meds:acetaminophen, calcium gluconate IVPB, calcium gluconate IVPB, calcium gluconate IVPB, dextrose 10%, dextrose 10%, fentaNYL, glucagon (human recombinant), HYDROmorphone, insulin aspart U-100, magnesium sulfate IVPB, magnesium sulfate IVPB, melatonin, metoclopramide HCl, ondansetron, potassium chloride **AND** potassium chloride **AND** potassium chloride, sodium chloride 0.9%, sodium chloride 0.9%     Review of patient's allergies indicates:  No Known Allergies     Past Medical History:   Diagnosis Date    Antiphospholipid syndrome 11/19/2021    Dysphagia     Hx of colonic polyps     followed by GI. Dr. Pham    Hyperlipidemia LDL goal <100     Overweight(278.02)     Prostate cancer september 2011    followed by urology, Dr. Rogers    Type II or unspecified type diabetes mellitus without mention of complication, not stated as uncontrolled     diet controlled     Past Surgical History:   Procedure Laterality Date    BRONCHOSCOPY N/A 10/15/2018     Procedure: BRONCHOSCOPY;  Surgeon: Pratibha Diagnostic Provider;  Location: [facility] OR [address] [place] [address];  Service: Anesthesiology;  Laterality: N/A;    CATARACT EXTRACTION, BILATERAL  2014    POSTERIOR CERVICAL LAMINECTOMY Bilateral 2022    Procedure: LAMINECTOMY, SPINE, CERVICAL, POSTERIOR APPROACH C3-6;  Surgeon: Carlos Miller MD;  Location: [facility] OR [address] [place] [address];  Service: Neurosurgery;  Laterality: Bilateral;    PROSTATECTOMY       Family History       Problem Relation (Age of Onset)    Colon cancer Mother    Dementia Brother    Diabetes Mother, Sister, , Brother, Brother, Maternal Aunt    Heart disease Father    Lung cancer Brother    Mental illness Sister    Myasthenia gravis Brother    Other Brother    Parkinsonism Brother    Pulmonary fibrosis Brother          Tobacco Use    Smoking status: Former Smoker     Packs/day: 0.25     Years: 5.00     Pack years: 1.25     Quit date: 10/2/1962     Years since quittin.6    Smokeless tobacco: Never Used    Tobacco comment: quit age 23   Substance and Sexual Activity    Alcohol use: Yes     Comment: beer occasionally    Drug use: No    Sexual activity: Yes     Partners: Female       Review of Systems   Unable to perform ROS: Mental status change   Objective:     Vital Signs (Most Recent):  Temp: 98.6 °F (37 °C) (22 1100)  Pulse: 78 (22 1359)  Resp: 16 (22 1359)  BP: (!) 146/65 (22 1300)  SpO2: 96 % (22 1359)   Vital Signs (24h Range):  Temp:  [94.5 °F (34.7 °C)-98.6 °F (37 °C)] 98.6 °F (37 °C)  Pulse:  [] 78  Resp:  [11-26] 16  SpO2:  [95 %-100 %] 96 %  BP: (103-152)/(56-89) 146/65  Arterial Line BP: (116-155)/(55-76) 146/64     Weight: 74.8 kg (165 lb)  Body mass index is 24.37 kg/m².  Body surface area is 1.91 meters squared.      Intake/Output Summary (Last 24 hours) at 2022 1433  Last data filed at 2022 1200  Gross per 24 hour   Intake 7287.08 ml   Output 9800 ml   Net -2512.92 ml       Physical Exam  Constitutional:        Appearance: He is well-developed. He is not diaphoretic.      Comments: somnolent   HENT:      Head: Normocephalic and atraumatic.   Eyes:      General: No scleral icterus.     Conjunctiva/sclera: Conjunctivae normal.   Neck:      Comments: Drains noted  Cardiovascular:      Rate and Rhythm: Normal rate and regular rhythm.      Heart sounds: Normal heart sounds. No murmur heard.    No friction rub. No gallop.   Pulmonary:      Effort: Pulmonary effort is normal. No respiratory distress.      Breath sounds: Normal breath sounds. No wheezing or rales.   Abdominal:      General: Bowel sounds are normal. There is no distension.      Palpations: Abdomen is soft.      Tenderness: There is no abdominal tenderness. There is no guarding.   Musculoskeletal:         General: Normal range of motion.   Skin:     General: Skin is warm and dry.      Findings: No rash.   Neurological:      Comments: Somnolent, holding right arm in contracture, occasional myoclonic jerks noted BUE       Significant Labs:   CBC:   Recent Labs   Lab 04/28/22  0441 04/28/22  1900 04/28/22  2104 04/29/22  0225   WBC 7.81  7.81  --  7.89 11.09   HGB 13.5*  13.5*  --  11.9* 11.6*   HCT 41.5  41.5 34* 36.3* 36.2*     244  --  230 244    and CMP:   Recent Labs   Lab 04/28/22  0441 04/28/22  2104 04/29/22  0225 04/29/22  0434 04/29/22  0743 04/29/22  1106     --  150*  149* 147* 152* 154*   K 3.8  --  3.9  --   --   --     107 113*  --   --   --    CO2 24 28 26  --   --   --    * 158* 172*  --   --   --    BUN 9 8 6*  --   --   --    CREATININE 0.9 0.8 0.8  --   --   --    CALCIUM 9.0  --  8.6*  --   --   --    PROT 7.3  --  6.5  --   --   --    ALBUMIN 3.8  --  3.6  --   --   --    BILITOT 0.4  --  0.3  --   --   --    ALKPHOS 73  --  59  --   --   --    AST 28  --  22  --   --   --    ALT 28  --  23  --   --   --    ANIONGAP 10  --  11  --   --   --    EGFRNONAA >60 >60.0 >60.0  --   --   --        Diagnostic Results:  I have  reviewed all pertinent imaging results/findings within the past 24 hours.

## 2022-04-29 NOTE — ASSESSMENT & PLAN NOTE
"Per patient's wife and grandson bedside, pt has had periods of intermittent confusion and mumbling since being dx with MAC in 2018. They state every time he comes to the hospital, he appears much more confused and mumbles.     -Pt disoriented post op, repeating words told to him, mumbling, or saying "yes/no"; per family bedside, this is not unexpected behavior for him based on previous admissions.  -cont to monitor neuro checks q1h for improvement in mental state  -pending CT head  "

## 2022-04-29 NOTE — SUBJECTIVE & OBJECTIVE
Medications Prior to Admission   Medication Sig Dispense Refill Last Dose    albuterol (PROVENTIL/VENTOLIN HFA) 90 mcg/actuation inhaler INHALE 2 PUFFS BY MOUTH EVERY 6 HOURS AS NEEDED FOR WHEEZING 9 g 0     ascorbic acid, vitamin C, (VITAMIN C) 1000 MG tablet Take 1,000 mg by mouth once daily.       aspirin (ECOTRIN) 81 MG EC tablet Take 81 mg by mouth.       b complex vitamins capsule Take 1 capsule by mouth once daily.       ethambutoL (MYAMBUTOL) 400 MG Tab Take 2 tablets (800 mg total) by mouth once daily. 60 tablet 5     mucus clearing device (AEROBIKA OSCILLATING PEP SYSTM) by Misc.(Non-Drug; Combo Route) route 2 (two) times a day. 1 Device 0     nebulizer and compressor ([employer] COMPRESSOR SYSTEM) Marcy use to administer nebulized medication as directed 1 each 0     omega 3-dha-epa-fish oil 1,000 (120-180) mg Cap Take 1 capsule by mouth once daily.       omega-3 fatty acids 1,000 mg Cap Take 2 g by mouth.       omeprazole 20 mg TbEC 1/2 tablet daily in am as needed. 45 each 0     pravastatin (PRAVACHOL) 10 MG tablet Take 1 tablet (10 mg total) by mouth once daily. 90 tablet 0     promethazine-dextromethorphan (PROMETHAZINE-DM) 6.25-15 mg/5 mL Syrp Take 10-15ml by mouth every 8 hours as needed for coughing 240 mL 0     sodium chloride 3% 3 % nebulizer solution USE 4 ML VIAL IN NEBULIZER  TWICE DAILY 480 mL 11     warfarin (COUMADIN) 3 MG tablet Take 1.5 tablets (4.5 mg total) by mouth Daily. As directed by coumadin clinic 45 tablet 11          Review of patient's allergies indicates:  No Known Allergies    Past Medical History:   Diagnosis Date    Antiphospholipid syndrome 11/19/2021    Dysphagia     Hx of colonic polyps     followed by GI. Dr. Pham    Hyperlipidemia LDL goal <100     Overweight(278.02)     Prostate cancer september 2011    followed by urology, Dr. Rogers    Type II or unspecified type diabetes mellitus without mention of complication, not stated as uncontrolled     diet controlled     Past  Surgical History:   Procedure Laterality Date    BRONCHOSCOPY N/A 10/15/2018    Procedure: BRONCHOSCOPY;  Surgeon: Pratibha Diagnostic Provider;  Location: [facility] OR [address] [place] [address];  Service: Anesthesiology;  Laterality: N/A;    CATARACT EXTRACTION, BILATERAL      POSTERIOR CERVICAL LAMINECTOMY Bilateral 2022    Procedure: LAMINECTOMY, SPINE, CERVICAL, POSTERIOR APPROACH C3-6;  Surgeon: Carlos Miller MD;  Location: [facility] OR [address] [place] [address];  Service: Neurosurgery;  Laterality: Bilateral;    PROSTATECTOMY       Family History       Problem Relation (Age of Onset)    Colon cancer Mother    Dementia Brother    Diabetes Mother, Sister, , Brother, Brother, Maternal Aunt    Heart disease Father    Lung cancer Brother    Mental illness Sister    Myasthenia gravis Brother    Other Brother    Parkinsonism Brother    Pulmonary fibrosis Brother          Tobacco Use    Smoking status: Former Smoker     Packs/day: 0.25     Years: 5.00     Pack years: 1.25     Quit date: 10/2/1962     Years since quittin.6    Smokeless tobacco: Never Used    Tobacco comment: quit age 23   Substance and Sexual Activity    Alcohol use: Yes     Comment: beer occasionally    Drug use: No    Sexual activity: Yes     Partners: Female     Review of Systems   Constitutional:  Negative for chills, fatigue and fever.   Eyes:  Negative for photophobia and visual disturbance.   Respiratory:  Negative for cough and shortness of breath.    Cardiovascular:  Negative for chest pain, palpitations and leg swelling.   Gastrointestinal:  Negative for constipation, diarrhea, nausea and vomiting.   Genitourinary:  Negative for difficulty urinating, dysuria, frequency and urgency.   Musculoskeletal:  Positive for myalgias. Negative for back pain, gait problem and neck pain.   Neurological:  Positive for weakness (right upper and lower extremities). Negative for dizziness, seizures, speech difficulty, numbness and headaches.   Psychiatric/Behavioral:  Negative for confusion.  The patient is not nervous/anxious.      Objective:     Weight: 75 kg (165 lb 5.5 oz)  Body mass index is 24.42 kg/m².  Vital Signs (Most Recent):  Temp: 97.9 °F (36.6 °C) (04/29/22 0303)  Pulse: 82 (04/29/22 0800)  Resp: 14 (04/29/22 0800)  BP: (!) 143/81 (04/29/22 0800)  SpO2: 95 % (04/29/22 0800)   Vital Signs (24h Range):  Temp:  [94.5 °F (34.7 °C)-98.4 °F (36.9 °C)] 97.9 °F (36.6 °C)  Pulse:  [] 82  Resp:  [11-26] 14  SpO2:  [92 %-100 %] 95 %  BP: ()/(56-89) 143/81  Arterial Line BP: (116-155)/(55-76) 152/72     Date 04/29/22 0700 - 04/30/22 0659   Shift 6917-3113 1830-1585 4283-2015 24 Hour Total   INTAKE   I.V.(mL/kg) 15(0.2)   15(0.2)   IV Piggyback 724   724   Shift Total(mL/kg) 739(9.9)   739(9.9)   OUTPUT   Urine(mL/kg/hr) 2075 2075   Shift Total(mL/kg) 2075(27.7)   2075(27.7)   Weight (kg) 75 75 75 75         Neurosurgery Physical Exam  General: well developed, well nourished, no distress.   Head: normocephalic, atraumatic  Neurologic: Alert and oriented. Thought content appropriate.  GCS: Motor: 6/Verbal: 5/Eyes: 4 GCS Total: 15  Mental Status: Awake, Alert, Oriented x 4  Language: No aphasia  Speech: No dysarthria  Cranial nerves: face symmetric, tongue midline, CN II-XII grossly intact.   Eyes: pupils equal, round, reactive to light with accommodation, EOMI.   Pulmonary: normal respirations, no signs of respiratory distress  Skin: Skin is warm, dry and intact.  Sensory: intact to light touch throughout  Motor Strength: left upper and lower extremities are 5/5 in strength with good tone. Right upper extremity is contracted and is 1/5 throughout. RLE is 2/5 in HF and KF, 1/5 in KE, 2/5 in DF/PF/EHL        Significant Labs:  Recent Labs   Lab 04/28/22  0441 04/28/22 2104 04/29/22 0225 04/29/22  0434 04/29/22  0743   * 158* 172*  --   --      --  150*  149* 147* 152*   K 3.8  --  3.9  --   --     107 113*  --   --    CO2 24 28 26  --   --    BUN 9 8 6*  --   --     CREATININE 0.9 0.8 0.8  --   --    CALCIUM 9.0  --  8.6*  --   --    MG  --   --  2.2  --   --        Recent Labs   Lab 04/28/22 0441 04/28/22 1900 04/28/22 2104 04/29/22 0225   WBC 7.81  7.81  --  7.89 11.09   HGB 13.5*  13.5*  --  11.9* 11.6*   HCT 41.5  41.5 34* 36.3* 36.2*     244  --  230 244       Recent Labs   Lab 04/28/22 0441 04/29/22 0225 04/29/22  0549   INR 3.1* 1.3*  --    APTT  --  33.0* 33.1*       Microbiology Results (last 7 days)       Procedure Component Value Units Date/Time    Blood culture [[id number]] Collected: 04/28/22 1201    Order Status: Completed Specimen: Blood from Peripheral, Hand, Left Updated: 04/28/22 1912     Blood Culture, Routine No Growth to date    Blood culture [[id number]] Collected: 04/28/22 1201    Order Status: Completed Specimen: Blood from Peripheral, Forearm, Right Updated: 04/28/22 1912     Blood Culture, Routine No Growth to date          All pertinent labs from the last 24 hours have been reviewed.    Significant Diagnostics:    Imaging Results              SURG FL Surgery Fluoro Usage (Final result)  Result time 04/28/22 19:52:20      Final result by Access, Silent  (04/28/22 19:52:20)                   Narrative:    See OP Notes for results.     IMPRESSION: See OP Notes for results.             This procedure was auto-finalized by: Virtual Radiologist                                      MRI Cervical Spine W WO Cont (Final result)  Result time 04/28/22 11:30:13   Procedure changed from MRI Cervical Spine With Contrast     Final result by Rhett Conway MD (04/28/22 11:30:13)                   Impression:      1. Abnormal nonspecific posterior epidural fluid, differential diagnosis includes hematoma and abscess.  This in conjunction with juxta-articular posterior soft tissue edema particularly on left and inter spinous process region, superimposed muscle strain posttraumatic change favored in may be responsible for the posterior  epidural fluid which could represent hematoma.  No osteomyelitis or septic facet joint disease or discitis or acute fracture noted on CTA neck exam.  2. Prominent degenerative disc spondylosis facet joint arthropathy changes particularly C3 through C6 levels.  3.  This report was flagged in Epic as abnormal.  4. Neuro surgical consultation suggested as well as clinical correlation.    COMMUNICATION  This critical result was discovered/received at 11:15 04/28/2022.  The critical information above was relayed directly by me by telephone to Dr Bay on 04/28/2022 at 1229 hours.      Electronically signed by: Rhett Conway MD  Date:    04/28/2022  Time:    11:30               Narrative:    EXAMINATION:  MRI CERVICAL SPINE W WO CONTRAST    CLINICAL HISTORY:  cervical spine extending from C3-C6 that may be artifact however the appearance is most concerning for the possibility of an intraspinal epidural process to include the possibility of epidural fluid collection, abscess or hematoma;    TECHNIQUE:  MRI cervical spine without and with intravenous Gadavist 8 cc.    COMPARISON:  CT and MRI imaging of brain, cervical spine dating back 2014.    FINDINGS:  Levoscoliosis cervicothoracic junction with tilting head right.  DJD chronic change anterior C1-C2 with mild hypertrophy transverse ligament and mild indentation adjacent cranial cervical junction.  Marrow space normal.  Degenerative disc spondylosis particularly to C4 through C6.  No fracture subluxation.    Posterior juxta-articular edema noted about left facet joints from C2 through C7 levels as well as between posterior spinous processes, limited similar juxta-articular edema right posterior lateral facet joint structures.  Abnormality right upper lobe suprahilar region, please refer to CTA chest same day report.    Posterior epidural fluid noted to extend from C2-C3-T1 level with isointense signal spinal cord on T1, heterogeneous increased signal on T2 and FLAIR  and limited enhancement of margins on post contrasted exam.  Study obscured by motion artifact particularly postcontrast exam.  Overall exam is of satisfactory diagnostic quality.    C2-C3 posterior disc normal, facet joint arthropathy with mild moderate right foramen and no left foramen and mild spinal stenosis.    C3-C4 mild moderate posterior central disc desiccated bulge, mild compression anterior spinal cord, moderate soft tissue spinal stenosis in conjunction with posterior epidural compression of spinal cord mild moderate degree.    C4-C5 mild moderate posterior disc bulge spondylosis, mild compression anterior spinal cord, severe compression posterior spinal cord by epidural fluid particularly on right, facet joint arthropathy with mild moderate foraminal and moderate severe soft tissues spinal stenosis.    C5-C6 mild moderate posterior disc bulge spondylosis, paracentral prominent left, mild moderate compression anterior spinal cord, moderate compression posterior spinal cord by epidural fluid, facet joint arthropathy with mild moderate foramen stenosis particularly on left, moderate soft tissue spinal stenosis.    C6-C7 mild moderate posterior disc bulge spondylosis, posterior central annular fissure, moderate compression anterior spinal cord, additional mild moderate compression posterior spinal cord by a epidural fluid, facet joint arthropathy, mild moderate foraminal stenosis more prominent on left, moderate soft tissue spinal stenosis.    C7-T1 posterior disc margin normal.  Some hypertrophy posterior spinal ligament, mild moderate compression posterior spinal sac, mild moderate soft tissue spinal stenosis.    Posterior spinal fluid collection extends to level of T1-T2 with mild moderate compression posterior spinal sac and mild moderate spinal stenosis.  Upper dorsal spinal cord and spinal canal otherwise appears intact.    No additional abnormal intra-axial extra-axial enhancement post-contrast exam.   No definite abnormal signal within spinal cord.                                       MRI Brain W WO Contrast (Final result)  Result time 04/28/22 10:49:28   Procedure changed from MRI Brain Without Contrast     Final result by Rhett Conway MD (04/28/22 10:49:28)                   Impression:      New metal opacities within posterior frontal high convexity scalp, perhaps representing post laceration therapy and clinical correlation requested.    MRI brain otherwise stable and unchanged.  No new stroke hemorrhage or subdural fluid.    Numerous areas of supratentorial gliosis.      Electronically signed by: Rhett Conway MD  Date:    04/28/2022  Time:    10:49               Narrative:    EXAMINATION:  MRI BRAIN W WO CONTRAST    CLINICAL HISTORY:  right sided weakness;    TECHNIQUE:  Multiplanar multisequence MR imaging of the brain was performed before and after the administration of 8 mL Gadavist intravenous contrast.    COMPARISON:  CTA head and neck, CT head same day and MRI CT brain studies dating back 2021 and CT soft tissue neck 2016    FINDINGS:  New metal scalp artifacts posterior frontal high convexity appearing since CT same day 03:50, could represent post laceration therapy and clinical correlation requested.    Central and cortical atrophy remains prominent particularly left posterior frontal lobe cortex.    Numerous scattered foci deep subcortical white matter gliosis particularly frontal and lesser degree parietooccipital more prominent on right, involvement of upper basal ganglia central semi ovale, subinsular cortex again more prominent on right.  Small foci increased signals cerebral peduncle and possibly vague area of linea are subtle gliosis beneath floor 4th ventricle.  Diffusion and gradient echo studies negative.    No new intra-axial or extra-axial enhancement of brain, hemorrhage, subdural fluid, mass or acute stroke.    Nasal septal deviation spur projects left with some  mucosal hypertrophy of ethmoid sinuses.  Distal vertebrobasilar distal internal carotid arteries and major branches patent flow void.                                        CTA Head and Neck (xpd) (Final result)  Result time 04/28/22 05:38:06      Final result by Rigoberto De Jesus MD (04/28/22 05:38:06)                   Impression:      The major intracranial and extracranial arterial vascular structures of the head and neck demonstrate no evidence for high-grade stenosis or occlusion, or dissection, and there is no evidence for intracranial aneurysm or AVM.    There is a finding of for a to the cervical spine extending from C3-C6 that may be artifact however the appearance is most concerning for the possibility of an intraspinal epidural process to include the possibility of epidural fluid collection, abscess or hematoma, clinical and historical correlation is needed, MRI examination of the cervical spine with contrast is recommended.    This report was flagged in Epic as abnormal.    The findings were reported to Dr Joyce in the ER at the time of dictation.      Electronically signed by: Rigoberto De Jesus  Date:    04/28/2022  Time:    05:38               Narrative:    EXAMINATION:  CTA HEAD AND NECK (XPD)    CLINICAL HISTORY:  Neuro deficit, acute, stroke suspected;    TECHNIQUE:  Non contrast low dose axial images were obtained through the head.  CT angiogram was performed from the level of the nae to the top of the head following the IV administration of 100mL of Omnipaque 350.   Sagittal and coronal reconstructions and maximum intensity projection reconstructions were performed. Arterial stenosis percentages are based on NASCET measurement criteria.    COMPARISON:  CT examination of the brain without contrast April 28, 2022, CT examination of the chest without contrast April 28, 2022    FINDINGS:  The vertebral arteries bilaterally demonstrate appropriate opacification extending to the level of the basilar  artery which also demonstrates appropriate opacification.  The P1 segment of the posterior cerebral artery bilaterally appears hypoplastic, and there appear to be bilateral posterior communicating arteries, otherwise the PCA bilaterally demonstrate appropriate opacification.    The common, external and internal carotid arteries bilaterally demonstrate appropriate opacification, the internal carotid arteries demonstrate opacification to the level of the [id number].  There is no evidence for high-grade stenosis or occlusion or dissection of the internal carotid arteries.  The anterior cerebral artery and middle cerebral artery bilaterally demonstrate appropriate opacification.  There is suggestion of a thin small anterior communicating artery.    There is no evidence for intracranial aneurysm or AVM.  The intracranial venous structures appear appropriate.    The visualized orbits appear appropriate.  The paranasal sinuses and mastoid air cells appear appropriate.  The parapharyngeal space bilaterally appears clear, there is no evidence for retropharyngeal abscess.  There is no abnormal thickening of the epiglottis.  The parotid, submandibular and thyroid glands appear appropriate.  The visualized osseous structures demonstrate chronic change.  Findings referable to the lung apices are discussed on the CT examination of the chest of the same date, referral to the chest CT report is recommended.    As best seen on axial series 3 and axial series 2 along the posterior aspect of the spinal canal, and appearing to extend to the right there is subtle suggestion of accentuated attenuation, along the cervical spinal canal extending from approximately C3 to C6.  This is also suggested on sagittal series 602.  This may be artifactual however an intraspinal epidural process to include epidural fluid collection, including the possibility of intraspinal epidural hematoma or abscess cannot be excluded based upon the  appearance.  MRI examination of the cervical spine with contrast is recommended for further evaluation.                                        CTA Chest Abdomen Pelvis (Final result)  Result time 04/28/22 05:38:52   Procedure changed from CTA Chest Abdomen Non Coronary     Final result by Rigoberto De Jesus MD (04/28/22 05:38:52)                   Impression:      The thoracic and abdominal aorta demonstrate no evidence for aneurysmal dilatation, aortic dissection or acute aortic injury.    Intrathoracic findings appears stable when compared to the CT examination of the chest of the same date, referral to the chest CT of the same date report is recommended.    Diverticula of the colon are noted in there is a segment of the sigmoid colon demonstrating mild wall thickening, this may relate to concentric muscular hypertrophy of diverticulosis however correlation for mild diverticulitis is needed, if the patient has not had recent colon screening exam I would recommend follow-up colonoscopy or fluoroscopic barium examination to exclude neoplasm.    Irregular contour of the liver again noted, correlation for chronic hepatic disease is needed.    Cholelithiasis is noted, there is no evidence for pericholecystic inflammatory change.    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    This report was flagged in Epic as abnormal.    The findings were reported to Dr Joyce in the ER at the time of dictation.      Electronically signed by: Rigoberto De Jesus  Date:    04/28/2022  Time:    05:38               Narrative:    EXAMINATION:  CTA CHEST ABDOMEN PELVIS    CLINICAL HISTORY:  Aortic dissection suspected;    TECHNIQUE:  CT examination of the chest abdomen and pelvis was performed via aortic angiographic protocol after the administration of 100 mL Omnipaque 350 intravenous contrast, timing of imaging optimized for evaluation of the aorta.  Axial imaging, sagittal and coronal reconstruction  imaging is submitted.  Axial MIP imaging ting of the chest is submitted.    COMPARISON:  CT examination of the chest without contrast April 28, 2022, CTA examination chest abdomen and pelvis October 15, 2018    FINDINGS:  The thoracic and abdominal aorta demonstrate appropriate opacification, there is no evidence for acute aortic leak, aortic dissection, or aneurysmal dilatation and no evidence for periaortic hematoma.  The visualized great vessels appear appropriate.  The pulmonary arterial vasculature appear appropriate.  The celiac artery, hepatic artery, and splenic artery demonstrate appropriate opacification as does the superior mesenteric artery which demonstrates mild narrowing at its origin without high-grade stenosis.  The renal arteries, inferior mesenteric arteries and iliac arterial vasculature appear appropriate.    Intrathoracic findings including cavitary lesion and abnormal areas of multifocal opacity and nodularity of the right lung appear stable when compared to the prior examination of the same date, the left hemithorax appears stable as well, referral to the earlier chest CT report is recommended.  Mediastinal adenopathy is again noted.  There is no pericardial effusion, there is no hemopericardium, there is no hemothorax.  There is no pneumothorax.    Diminished attenuation of the liver consistent with diffuse fatty infiltrate is noted, irregular contour of the liver again noted, correlation for chronic hepatic disease is needed.  Cholelithiasis is noted, there is no evidence for pericholecystic or peripancreatic inflammatory change.  The stomach appears unremarkable for degree of distention.  There is no evidence for acute process of the spleen or adrenal glands.  There is no hydronephrosis or obstructive uropathy bilaterally.  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  The urinary bladder appears unremarkable for degree of  distention.    There is no evidence for small bowel obstructive process.  The appendix is identified, it does not appear inflamed.  Mild prominence of the colon with stool is noted.  Diverticula of the colon are noted.  There is a segment of the sigmoid colon as best seen on axial series 2, images 770 through 809 that demonstrates mild wall thickening, this may relate to concentric muscular hypertrophy of diverticulosis, as significant pericolonic inflammatory change is not appreciated however correlation for signs or symptoms of mild colitis or diverticulitis is needed, if the patient has not had recent colon screening exam I would recommend follow-up colonoscopy or fluoroscopic barium exam to exclude the possibility of neoplasm.  There is no additional evidence for colonic inflammatory or obstructive change.  There is no free intraperitoneal air.  The visualized osseous structures demonstrate chronic change.                                       CT Chest Without Contrast (Final result)  Result time 04/28/22 04:26:44      Final result by Rigoberto De Jesus MD (04/28/22 04:26:44)                   Impression:      Previously identified right upper lobe cavitary lung lesion again noted, diminishing size, in addition there are multifocal areas of pulmonary opacity, areas of consolidation, airspace disease, and nodularity, involving the right hemithorax, overall demonstrating mild improvement.    Chronic lung changes bilaterally are again noted, and the left hemithorax appears stable.    Mediastinal adenopathy appears stable.    Findings referable to the liver for which correlation for chronic hepatic disease is needed again noted.    Cholelithiasis, there is no evidence for pericholecystic inflammatory change.    Mild pericardial thickening or fluid anteriorly, without large pericardial effusion.      Electronically signed by: Rigoberto De Jesus  Date:    04/28/2022  Time:    04:26               Narrative:     EXAMINATION:  CT CHEST WITHOUT CONTRAST    CLINICAL HISTORY:  back pain, neurologic deficits;    TECHNIQUE:  Low dose axial images, sagittal and coronal reformations were obtained from the thoracic inlet to the lung bases. Contrast was not administered.  The lack of intravenous contrast diminishes the sensitivity of the examination.    COMPARISON:  CT examination of the chest without contrast November 2, 2021    FINDINGS:  The previously identified cavitary lesion of the right upper lobe is again noted, it appears diminished in size when compared to the prior examination, when measured at a similar level to the prior study it measures approximately 1.7 by 3.4 cm on axial imaging compared to 3.3 x 4.4 cm on the prior exam.  There is appearance consistent with bronchial communication to the cavitary lesion, appearing similar to the prior exam.  This is somewhat more conspicuous at this time.  There is continued appearance of surrounding patchy and confluent and nodular opacities with multifocal airspace opacities and of the right lung, the overall pattern and distribution is similar to the prior study, however the overall appearance is that of improvement.    Chronic appearing lung changes are again noted.  There are mild atelectatic changes noted as well.  The left hemithorax appears stable.  There is no evidence for significant pleural effusion and there is no evidence for pneumothorax.    Mediastinal adenopathy is again noted appearing stable.  There is mild greater thickening or fluid along the pericardium anteriorly.  Otherwise the appearance of the heart and great vessels is stable, evaluation is limited on this noncontrast exam.    Limited imaging of the upper abdomen demonstrates evidence for cholelithiasis without evidence for pericholecystic inflammatory change.  Irregular contour of the liver again noted, correlation for chronic hepatic disease is needed.  The visualized osseous structures appear intact.   Chronic changes are noted.                                       CT Head Without Contrast (Final result)  Result time 04/28/22 04:07:27      Final result by Rigoberto De Jesus MD (04/28/22 04:07:27)                   Impression:      Chronic changes are noted, there is no evidence for superimposed acute intracranial process.      Electronically signed by: Rigoberto De Jesus  Date:    04/28/2022  Time:    04:07               Narrative:    EXAMINATION:  CT HEAD WITHOUT CONTRAST    CLINICAL HISTORY:  Neuro deficit, acute, stroke suspected;    TECHNIQUE:  Low dose axial images were obtained through the head.  Coronal and sagittal reformations were also performed. Contrast was not administered.    COMPARISON:  CT examination of the brain without contrast February 2, 2021    FINDINGS:  The ventricular system, sulcal pattern and parenchymal attenuation characteristics are consistent with chronic change.  Involutional changes noted, chronic appearing white matter change noted.  There is no evidence for superimposed acute process.  There is no evidence for intracranial mass, mass effect or midline shift, there is no evidence for acute intracranial hemorrhage.  Appropriate CSF spaces are seen at the skull base.    The visualized orbits appear intact.  The mastoid air cells and middle ear cavity bilaterally appear appropriate, as do the paranasal sinuses.  The visualized osseous structures appear intact.                                        Physical Exam

## 2022-04-29 NOTE — PT/OT/SLP EVAL
Speech Language Pathology Evaluation  Cognitive/Bedside Swallow    Patient Name:  Diego Gunter   MRN:  [id number]  Admitting Diagnosis: Epidural hematoma    Recommendations:                  General Recommendations:  Dysphagia therapy, Speech/language therapy and Cognitive-linguistic therapy  Diet recommendations:  NPO  Aspiration Precautions: Assistance with meals, Assistance with thickening liquids, Avoid talking while eating, Eliminate distractions, HOB to 90 degrees, Meds crushed in puree and Small bites/sips   General Precautions: Standard, aspiration  Communication strategies:  go to room if call light pushed    History:     Past Medical History:   Diagnosis Date    Antiphospholipid syndrome 11/19/2021    Dysphagia     Hx of colonic polyps     followed by GI. Dr. Pham    Hyperlipidemia LDL goal <100     Overweight(278.02)     Prostate cancer september 2011    followed by urology, Dr. Rogers    Type II or unspecified type diabetes mellitus without mention of complication, not stated as uncontrolled     diet controlled       Past Surgical History:   Procedure Laterality Date    BRONCHOSCOPY N/A 10/15/2018    Procedure: BRONCHOSCOPY;  Surgeon: Pratibha Diagnostic Provider;  Location: [facility] OR [address] [place] [address];  Service: Anesthesiology;  Laterality: N/A;    CATARACT EXTRACTION, BILATERAL  2014    POSTERIOR CERVICAL LAMINECTOMY Bilateral 4/28/2022    Procedure: LAMINECTOMY, SPINE, CERVICAL, POSTERIOR APPROACH C3-6;  Surgeon: Carlos Miller MD;  Location: [facility] OR [address] [place] [address];  Service: Neurosurgery;  Laterality: Bilateral;    PROSTATECTOMY         Prior diet: Evaluated previous day at OSH with recs for regular diet and thin liquids. Previous SLP evaluation dating ~1year ago also with recs for regular diet and thin liquids.     Subjective     Patient awake and alert. Family members present for session. Cleared with RN prior to entry.     Pain/Comfort:  ·   No c/o pain.     Respiratory Status: Nasal cannula, flow .  "L/min    Objective:     Cognitive Status:    Arousal/Alertness Delayed response to stimuli , and Inconsistent responses .  Perseveration Present-verbal on "wife" and "concratulations"       Receptive Language:   Comprehension:      Questions Simple yes/no with 20% acc  Commands  One step with model and max A     Expressive Language:  Verbal:    Automatic Speech  Counting 0% acc      Motor Speech:  Dysarthria .    Voice:   Intensity decreased    Visual-Spatial:  WFL    Reading:   TBD      Written Expression:   TBD    Oral Musculature Evaluation  · Oral Musculature: unable to assess due to poor participation/comprehension  · Dentition: present and adequate  · Secretion Management: adequate  · Oral Labial Strength and Mobility: WFL  · Lingual Strength and Mobility: WFL  · Voice Prior to PO Intake: Clear and audible    Bedside Swallow Eval:   Consistencies Assessed:  · Thin liquids via tsp x2  · Nectar thick liquids via tsp cup and straw across 8oz  · Puree x2x3      Oral Phase:   · Limited oral cavity opening 2/2 C collar    Pharyngeal Phase:   · coughing/choking s/p thin liquids.   · Throat clears with meds whole one a time with nectar thickened liquids  · No overt signs of airway compromise with puree or nectar thickened liquids in isolation.     Compensatory Strategies  · None    Treatment:  Skilled education was provided to patient and family members re: diet recs, standard aspiration precautions of which to follow, and ongoing  plan of care.      Assessment:     Diego Gunter is a 81 y.o. male with an SLP diagnosis of Aphasia, Dysphagia, and Dysarthria.      Goals:   Multidisciplinary Problems       SLP Goals          Problem: SLP    Goal Priority Disciplines Outcome   SLP Goal    Low SLP Ongoing, Progressing   Description: Speech Language Pathology Goals  Goals expected to be met by 5/6:  1. Patient will tolerate a puree diet and nectar thickened liquids with no overt signs of airway compromise.   2. " Patient will answer simple yes/no questions with 50% acc indep.   3. Patient will complete auto speech tasks with 60% acc given min A.   4. Patient will follow simple commands with 60% acc given min A.   5. Patient will participate in further assessments as appropriate.                                  Plan:     · Patient to be seen:  3 x/week   · Plan of Care expires:     · Plan of Care reviewed with:  patient, spouse   · SLP Follow-Up:  Yes       Discharge recommendations:  Discharge Facility/Level of Care Needs: other (see comments)   Barriers to Discharge:  None    Time Tracking:     SLP Treatment Date:   04/29/22  Speech Start Time:  0835  Speech Stop Time:  0900     Speech Total Time (min):  25 min    Billable Minutes: Eval 9 , Eval Swallow and Oral Function 8, and Self Care/Home Management Training 8    04/29/2022

## 2022-04-29 NOTE — NURSING
[facility]  [address] [place] [address] [address]  [address] [facility] [address]  Phone: [phone]  Fax: [phone]    Mike Cook MD        May 2, 2019     Patient: Janay Foot   YOB: 1962   Date of Visit: 5/2/2019       To Whom It May Concern: It is my medical opinion that Janay Foot may return to work on Friday, May 3, 2019. No restrictions. If you have any questions or concerns, please don't hesitate to call.     Sincerely,        Mike Cook MD Pt dumping urine. Floyd bag was emptied around 2300 and the entire bag was 2L since he was admitted around 2030. Urine output was 350ml @12, 100ml @1am, & again 350ml @ 2am. ELISA Mendez notified. See orders for more details.

## 2022-04-29 NOTE — CONSULTS
"[facility]  Adult Nutrition  Consult Note    SUMMARY     Recommendations    1. Continue current texture-modified diet per SLP    - Assist with all meals/meal setup prn     2. If PO intake >50%, add Boost Pudding BID     3. RD to monitor and follow    Goals: Pt will meet and tolerate >75% EEN/EPN by RD f/u  Nutrition Goal Status: new    Communication of RD Recs:  (POC)    Assessment and Plan    Nutrition Problem  Inadequate oral intake    Related to (etiology):   Swallowing difficulties    Signs and Symptoms (as evidenced by):   Dysphagia diagnosis per SLP     Interventions (treatment strategy):  Collaboration with other providers  Texture-modified diet    Nutrition Diagnosis Status:   New     Reason for Assessment    Reason For Assessment: consult (Assess nutritional needs)  Diagnosis:  (epidural hematoma)  Relevant Medical History: Prostate CA, T2DM, HLD  Interdisciplinary Rounds: did not attend    General Information Comments: Pt presents with epidural hematoma. Pt confused and repeating the same answers during assessment. GCS 13. Spouse at bedside. C-collar in place. Diet advanced to pureed this afternoon. No PO intake yet. Pt and spouse report good appetite PTA following Low Na/Fat diet. -165#; no recent wt loss. NFPE not warranted; pt appears nourished with no s/s of malnutrition.    Nutrition Discharge Planning: Adequate intake on Low Na diet    Nutrition Risk Screen    Nutrition Risk Screen: other (see comments)    Nutrition/Diet History    Patient Reported Diet/Restrictions/Preferences: heart healthy  Spiritual, Cultural Beliefs, Zoroastrianism Practices, Values that Affect Care: no  Food Allergies: NKFA  Factors Affecting Nutritional Intake: None identified at this time    Anthropometrics    Temp: 98.6 °F (37 °C)  Height Method: Estimated  Height: 5' 9" (175.3 cm)  Height (inches): 69 in  Weight Method: Bed Scale  Weight: 74.8 kg (165 lb)  Weight (lb): 165 lb  Ideal Body Weight " (IBW), Male: 160 lb  % Ideal Body Weight, Male (lb): 103.13 %  BMI (Calculated): 24.4  BMI Grade: 18.5-24.9 - normal  Usual Body Weight (UBW), k kg  % Usual Body Weight: 100       Lab/Procedures/Meds    Pertinent Labs Reviewed: reviewed  Pertinent Labs Comments: Na 152, Glu 172, Phos 1.8, osmolality 318  Pertinent Medications Reviewed: reviewed  Pertinent Medications Comments: robaxin, senna, precedex    Estimated/Assessed Needs    Weight Used For Calorie Calculations: 74.8 kg (165 lb)  Energy Calorie Requirements (kcal): 9498-7246 kcal/day  Energy Need Method: Bossier-St Jeor (x 1.25)  Protein Requirements: 70-85 g/day (1.0-1.2 g/kg)  Weight Used For Protein Calculations: 70.3 kg (155 lb)  Fluid Requirements (mL): 1 mL/kcal or per MD  Estimated Fluid Requirement Method: RDA Method  RDA Method (mL): 1454  CHO Requirement: 227 g/day    Nutrition Prescription Ordered    Current Diet Order: Pureed  Nutrition Order Comments: Nectar-thick liquids    Evaluation of Received Nutrient/Fluid Intake    I/O: -1.6L since admit  Energy Calories Required: not meeting needs  Protein Required: not meeting needs  Comments: LBM   Tolerance:  (No PO intake yet)  % Intake of Estimated Energy Needs: No PO intake yet   % Meal Intake: No PO intake yet     Nutrition Risk    Level of Risk/Frequency of Follow-up: low     Monitor and Evaluation    Food and Nutrient Intake: energy intake, food and beverage intake  Food and Nutrient Adminstration: diet order  Knowledge/Beliefs/Attitudes: food and nutrition knowledge/skill  Physical Activity and Function: nutrition-related ADLs and IADLs  Anthropometric Measurements: weight change, weight  Biochemical Data, Medical Tests and Procedures: gastrointestinal profile, inflammatory profile, glucose/endocrine profile, electrolyte and renal panel, lipid profile  Nutrition-Focused Physical Findings: overall appearance     Nutrition Follow-Up    RD Follow-up?: Yes

## 2022-04-29 NOTE — PROGRESS NOTES
Patient traveled back to room 9073 via bed from CT scan. Bed locked, wheels in lowest position, call light in reach, patient oriented to room. Family at bedside updated at this time. Will continue to monitor.

## 2022-04-29 NOTE — HOSPITAL COURSE
04/29/2022C: CT head today per NSGY team, DDAVP once, ID consult  04/30/2022 levophed for map goals ; continue ID abx f/u cx; DDAVP 0600 for high uop; 0.45NS; traumatic amaya removal by pt w/ bleeding from urethra: new amaya inserted without clots in bladder; MRI cspine today   05/01/2022 maps goal ; cont abx per ID; monitor uop; no new bleeding noted from urethral meatus cont amaya until 5/3 and reassess;

## 2022-04-29 NOTE — CONSULTS
[facility]  Hematology/Oncology  Consult Note    Patient Name: Diego Gunter  MRN: [id number]  Admission Date: 4/28/2022  Hospital Length of Stay: 1 days  Code Status: Full Code   Attending Provider: Donato Simth MD  Consulting Provider: Holli Sumner DO  Primary Care Physician: Portia Ferreira MD  Principal Problem:Epidural hematoma    Inpatient consult to Hematology/Oncology  Consult performed by: Holli Sumner DO  Consult ordered by: Italia Mendez PA-C        Subjective:     HPI:  Mr. Diego Gunter is a 81 year old male with DM2, prostate cancer s/p prostatectomy in 2011, MAC pneumonia, mild cognitive impairment, and antiphospholipid syndrome. He was admitted to the neuro ICU on 4/28 for cervical epidural hematoma. Hematology was consulted for assistance with management of antiphospholipid syndrome.     Mr. Gunter was diagnosed with APLS after he presented with a pulmonary embolus in the left upper lobar artery. This occurred at the same time as infection with COVID-19 so was thought to be possibly provoked due to COVID. He was tested for hypercoagulable state, and workup was significant for positive IgM cardiolipin antibody with titer 41. He was started on coumadin and has since followed up with Dr. Pantoja in clinic. He had repeat labs done again 5 months later, with positive IgM cardiolipin antibody with titer 60. DRVVT was positive at that time, but was previously negative. After a risk/benefit discussion with Dr. Pantoja, the decision was made to proceed with therapeutic anticoagulation with warfarin.     Mr. Gunter presented to the hospital last night after waking up at midnight and finding that he was unable to move his right arm or leg. He was found to have a cervical epidural hematoma. He was given vitamin K and Kcentra and had emergent C3-C6 laminectomy with evacuation of the hematoma.            Oncology Treatment Plan:   [Could not find a treatment plan. This SmartLink  may be configured incorrectly. Contact a  for help.]    Medications:  Continuous Infusions:   sodium chloride 0.45% 125 mL/hr at 04/29/22 1200     Scheduled Meds:   acetaminophen  1,000 mg Oral Q8H    albuterol-ipratropium  3 mL Nebulization Q6H    azithromycin  500 mg Oral Daily    ceFAZolin (ANCEF) IVPB  1 g Intravenous Q8H    dexamethasone  4 mg Intravenous Q6H    ethambutoL  800 mg Oral Daily    famotidine (PF)  20 mg Intravenous BID    methocarbamoL  500 mg Oral Q6H    midazolam        mucus clearing device   Misc.(Non-Drug; Combo Route) BID    mupirocin   Nasal BID    nebulizer and compressor  1 Device Misc.(Non-Drug; Combo Route) BID    polyethylene glycol  17 g Oral Daily    pravastatin  10 mg Oral Daily    pregabalin  50 mg Oral TID    senna-docusate 8.6-50 mg  1 tablet Oral BID    silodosin  4 mg Oral Daily    sodium chloride 3%  4 mL Nebulization BID     PRN Meds:acetaminophen, calcium gluconate IVPB, calcium gluconate IVPB, calcium gluconate IVPB, dextrose 10%, dextrose 10%, fentaNYL, glucagon (human recombinant), HYDROmorphone, insulin aspart U-100, magnesium sulfate IVPB, magnesium sulfate IVPB, melatonin, metoclopramide HCl, ondansetron, potassium chloride **AND** potassium chloride **AND** potassium chloride, sodium chloride 0.9%, sodium chloride 0.9%     Review of patient's allergies indicates:  No Known Allergies     Past Medical History:   Diagnosis Date    Antiphospholipid syndrome 11/19/2021    Dysphagia     Hx of colonic polyps     followed by GI. Dr. Pham    Hyperlipidemia LDL goal <100     Overweight(278.02)     Prostate cancer september 2011    followed by urology, Dr. Rogers    Type II or unspecified type diabetes mellitus without mention of complication, not stated as uncontrolled     diet controlled     Past Surgical History:   Procedure Laterality Date    BRONCHOSCOPY N/A 10/15/2018    Procedure: BRONCHOSCOPY;  Surgeon: Dosc Diagnostic  Provider;  [facility];  Service: Anesthesiology;  Laterality: N/A;    CATARACT EXTRACTION, BILATERAL      POSTERIOR CERVICAL LAMINECTOMY Bilateral 2022    Procedure: LAMINECTOMY, SPINE, CERVICAL, POSTERIOR APPROACH C3-6;  Surgeon: Carlos Miller MD;  [facility];  Service: Neurosurgery;  Laterality: Bilateral;    PROSTATECTOMY       Family History       Problem Relation (Age of Onset)    Colon cancer Mother    Dementia Brother    Diabetes Mother, Sister, , Brother, Brother, Maternal Aunt    Heart disease Father    Lung cancer Brother    Mental illness Sister    Myasthenia gravis Brother    Other Brother    Parkinsonism Brother    Pulmonary fibrosis Brother          Tobacco Use    Smoking status: Former Smoker     Packs/day: 0.25     Years: 5.00     Pack years: 1.25     Quit date: 10/2/1962     Years since quittin.6    Smokeless tobacco: Never Used    Tobacco comment: quit age 23   Substance and Sexual Activity    Alcohol use: Yes     Comment: beer occasionally    Drug use: No    Sexual activity: Yes     Partners: Female       Review of Systems   Unable to perform ROS: Mental status change   Objective:     Vital Signs (Most Recent):  Temp: 98.6 °F (37 °C) (22 1100)  Pulse: 78 (22 1359)  Resp: 16 (22 1359)  BP: (!) 146/65 (22 1300)  SpO2: 96 % (22 1359)   Vital Signs (24h Range):  Temp:  [94.5 °F (34.7 °C)-98.6 °F (37 °C)] 98.6 °F (37 °C)  Pulse:  [] 78  Resp:  [11-26] 16  SpO2:  [95 %-100 %] 96 %  BP: (103-152)/(56-89) 146/65  Arterial Line BP: (116-155)/(55-76) 146/64     Weight: 74.8 kg (165 lb)  Body mass index is 24.37 kg/m².  Body surface area is 1.91 meters squared.      Intake/Output Summary (Last 24 hours) at 2022 1433  Last data filed at 2022 1200  Gross per 24 hour   Intake 7287.08 ml   Output 9800 ml   Net -2512.92 ml       Physical Exam  Constitutional:       Appearance: He is well-developed. He is not  diaphoretic.      Comments: somnolent   HENT:      Head: Normocephalic and atraumatic.   Eyes:      General: No scleral icterus.     Conjunctiva/sclera: Conjunctivae normal.   Neck:      Comments: Drains noted  Cardiovascular:      Rate and Rhythm: Normal rate and regular rhythm.      Heart sounds: Normal heart sounds. No murmur heard.    No friction rub. No gallop.   Pulmonary:      Effort: Pulmonary effort is normal. No respiratory distress.      Breath sounds: Normal breath sounds. No wheezing or rales.   Abdominal:      General: Bowel sounds are normal. There is no distension.      Palpations: Abdomen is soft.      Tenderness: There is no abdominal tenderness. There is no guarding.   Musculoskeletal:         General: Normal range of motion.   Skin:     General: Skin is warm and dry.      Findings: No rash.   Neurological:      Comments: Somnolent, holding right arm in contracture, occasional myoclonic jerks noted BUE       Significant Labs:   CBC:   Recent Labs   Lab 04/28/22  0441 04/28/22  1900 04/28/22  2104 04/29/22  0225   WBC 7.81  7.81  --  7.89 11.09   HGB 13.5*  13.5*  --  11.9* 11.6*   HCT 41.5  41.5 34* 36.3* 36.2*     244  --  230 244    and CMP:   Recent Labs   Lab 04/28/22  0441 04/28/22  2104 04/29/22  0225 04/29/22  0434 04/29/22  0743 04/29/22  1106     --  150*  149* 147* 152* 154*   K 3.8  --  3.9  --   --   --     107 113*  --   --   --    CO2 24 28 26  --   --   --    * 158* 172*  --   --   --    BUN 9 8 6*  --   --   --    CREATININE 0.9 0.8 0.8  --   --   --    CALCIUM 9.0  --  8.6*  --   --   --    PROT 7.3  --  6.5  --   --   --    ALBUMIN 3.8  --  3.6  --   --   --    BILITOT 0.4  --  0.3  --   --   --    ALKPHOS 73  --  59  --   --   --    AST 28  --  22  --   --   --    ALT 28  --  23  --   --   --    ANIONGAP 10  --  11  --   --   --    EGFRNONAA >60 >60.0 >60.0  --   --   --        Diagnostic Results:  I have reviewed all pertinent imaging  results/findings within the past 24 hours.    Assessment/Plan:     Antiphospholipid syndrome  Mr. Gunter was diagnosed with type IIb antiphospholipid antibody syndrome after he had a pulmonary embolism in the setting of COVID-19 infection. He had two labs done 5 months apart which confirmed anticardiolipin antibody presence. DRVVT was only positive once. He is now admitted with cervical epidural hematoma resulting in neurological deficits.     Given the fact that his only clot history was a provoked PE in the setting of COVID infection, and since he is consistently positive for cardiolipin antibody but not consistently positive for DRVVT, it would be reasonable to trial off of therapeutic anticoagulation. This does carry a risk of potential thrombus development.     Recommendations:  - will order repeat cardiolipin antibodies, beta 2 glycoprotein, and DRVVT to be drawn tomorrow morning, as these may help assist with risk stratification in the future. Note that DRVVT may be falsely elevated with recent warfarin administration  - we recommend not resuming therapeutic anticoagulation at this time in the context of his current bleeding event and history of only one clot which seems to have been provoked  - recommend prophylactic anticoagulation while inpatient if deemed to be safe by the neurosurgical team  - will follow up on repeat aPL labs and provide further recommendations, though at this time will recommend holding therapeutic anticoagulation until follow up with Dr. Pantoja        Thank you for your consult. I will follow-up with patient. Please contact us if you have any additional questions.    Holli Sumner,   Hematology/Oncology  [facility]

## 2022-04-29 NOTE — ASSESSMENT & PLAN NOTE
s/p C3-C6 decompression of epidural hematoma and posterior fusion    -NSGY following  -dex 4 q6h + H2B  -MAP goal >85, maintaining on NS  - q1h neuro and VS checks  -PT/OT/SLP as appropriate  -c collar in place  -drains per NSGY  -post op XR pending  -hold AC/AP in acute post op setting  -cefazolin ppx  4/29/2022: pending CT head per NSGY team   Continue MAP >85

## 2022-04-29 NOTE — HPI
Mr. Diego Gunter is a 81 year old male with DM2, prostate cancer s/p prostatectomy in 2011, MAC pneumonia, mild cognitive impairment, and antiphospholipid syndrome. He was admitted to the neuro ICU on 4/28 for cervical epidural hematoma. Hematology was consulted for assistance with management of antiphospholipid syndrome.     Mr. Gunter was diagnosed with APLS after he presented with a pulmonary embolus in the left upper lobar artery. This occurred at the same time as infection with COVID-19 so was thought to be possibly provoked due to COVID. He was tested for hypercoagulable state, and workup was significant for positive IgM cardiolipin antibody with titer 41. He was started on coumadin and has since followed up with Dr. Pantoja in clinic. He had repeat labs done again 5 months later, with positive IgM cardiolipin antibody with titer 60. DRVVT was positive at that time, but was previously negative. After a risk/benefit discussion with Dr. Pantoja, the decision was made to proceed with therapeutic anticoagulation with warfarin.     Mr. Gunter presented to the hospital last night after waking up at midnight and finding that he was unable to move his right arm or leg. He was found to have a cervical epidural hematoma. He was given vitamin K and Kcentra and had emergent C3-C6 laminectomy with evacuation of the hematoma.

## 2022-04-29 NOTE — PROGRESS NOTES
[facility]  Neurosurgery  Progress Note    Subjective:     History of Present Illness: Patient is a 82 y/o male with antiphospholipid syndrome on chronic coumadin, MAC pneumonia, HLD, and left PE diagnosed January 2021 who was transferred from  for compressive epidural hematoma in the cervical spine. Patient reports that he woke up in the middle of the night and could no move the right side of his body. He went to  ED and stroke code initiated. Patient was not given tPA but given ASA. CTA was negative for acute CVA but did show compressive fluid collective from C3-C6. MRI cervical spine confirmed. [place] [facility] [place]. He reports he cannot move his right arm out of the contracted position, he also cannot lift his right leg off the bed. He reports the right arm and leg are painful. No complaints on left side of body. Denies b/b dysfunction.       Subjective: not following commands appropriately this am. Will get CTH.     Post-Op Info:  Procedure(s) (LRB):  LAMINECTOMY, SPINE, CERVICAL, POSTERIOR APPROACH C3-6 (Bilateral)   1 Day Post-Op     Medications Prior to Admission   Medication Sig Dispense Refill Last Dose    albuterol (PROVENTIL/VENTOLIN HFA) 90 mcg/actuation inhaler INHALE 2 PUFFS BY MOUTH EVERY 6 HOURS AS NEEDED FOR WHEEZING 9 g 0     ascorbic acid, vitamin C, (VITAMIN C) 1000 MG tablet Take 1,000 mg by mouth once daily.       aspirin (ECOTRIN) 81 MG EC tablet Take 81 mg by mouth.       b complex vitamins capsule Take 1 capsule by mouth once daily.       ethambutoL (MYAMBUTOL) 400 MG Tab Take 2 tablets (800 mg total) by mouth once daily. 60 tablet 5     mucus clearing device (AEROBIKA OSCILLATING PEP SYSTM) by Misc.(Non-Drug; Combo Route) route 2 (two) times a day. 1 Device 0     nebulizer and compressor ([employer] COMPRESSOR SYSTEM) Marcy use to administer nebulized medication as directed 1 each 0     omega 3-dha-epa-fish oil 1,000  (120-180) mg Cap Take 1 capsule by mouth once daily.       omega-3 fatty acids 1,000 mg Cap Take 2 g by mouth.       omeprazole 20 mg TbEC 1/2 tablet daily in am as needed. 45 each 0     pravastatin (PRAVACHOL) 10 MG tablet Take 1 tablet (10 mg total) by mouth once daily. 90 tablet 0     promethazine-dextromethorphan (PROMETHAZINE-DM) 6.25-15 mg/5 mL Syrp Take 10-15ml by mouth every 8 hours as needed for coughing 240 mL 0     sodium chloride 3% 3 % nebulizer solution USE 4 ML VIAL IN NEBULIZER  TWICE DAILY 480 mL 11     warfarin (COUMADIN) 3 MG tablet Take 1.5 tablets (4.5 mg total) by mouth Daily. As directed by coumadin clinic 45 tablet 11          Review of patient's allergies indicates:  No Known Allergies    Past Medical History:   Diagnosis Date    Antiphospholipid syndrome 11/19/2021    Dysphagia     Hx of colonic polyps     followed by GI. Dr. Pham    Hyperlipidemia LDL goal <100     Overweight(278.02)     Prostate cancer september 2011    followed by urology, Dr. Rogers    Type II or unspecified type diabetes mellitus without mention of complication, not stated as uncontrolled     diet controlled     Past Surgical History:   Procedure Laterality Date    BRONCHOSCOPY N/A 10/15/2018    Procedure: BRONCHOSCOPY;  Surgeon: [facility];  Location: [facility] OR [address] [place] [address];  Service: Anesthesiology;  Laterality: N/A;    CATARACT EXTRACTION, BILATERAL  2014    POSTERIOR CERVICAL LAMINECTOMY Bilateral 4/28/2022    Procedure: LAMINECTOMY, SPINE, CERVICAL, POSTERIOR APPROACH C3-6;  Surgeon: Carlos Miller MD;  Location: [facility] OR [address] [place] [address];  Service: Neurosurgery;  Laterality: Bilateral;    PROSTATECTOMY       Family History       Problem Relation (Age of Onset)    Colon cancer Mother    Dementia Brother    Diabetes Mother, Sister, , Brother, Brother, Maternal Aunt    Heart disease Father    Lung cancer Brother    Mental illness Sister    Myasthenia gravis Brother    Other Brother    Parkinsonism  Brother    Pulmonary fibrosis Brother          Tobacco Use    Smoking status: Former Smoker     Packs/day: 0.25     Years: 5.00     Pack years: 1.25     Quit date: 10/2/1962     Years since quittin.6    Smokeless tobacco: Never Used    Tobacco comment: quit age 23   Substance and Sexual Activity    Alcohol use: Yes     Comment: beer occasionally    Drug use: No    Sexual activity: Yes     Partners: Female     Review of Systems   Constitutional:  Negative for chills, fatigue and fever.   Eyes:  Negative for photophobia and visual disturbance.   Respiratory:  Negative for cough and shortness of breath.    Cardiovascular:  Negative for chest pain, palpitations and leg swelling.   Gastrointestinal:  Negative for constipation, diarrhea, nausea and vomiting.   Genitourinary:  Negative for difficulty urinating, dysuria, frequency and urgency.   Musculoskeletal:  Positive for myalgias. Negative for back pain, gait problem and neck pain.   Neurological:  Positive for weakness (right upper and lower extremities). Negative for dizziness, seizures, speech difficulty, numbness and headaches.   Psychiatric/Behavioral:  Negative for confusion. The patient is not nervous/anxious.      Objective:     Weight: 75 kg (165 lb 5.5 oz)  Body mass index is 24.42 kg/m².  Vital Signs (Most Recent):  Temp: 97.9 °F (36.6 °C) (22 0303)  Pulse: 82 (22 0800)  Resp: 14 (22 0800)  BP: (!) 143/81 (22 0800)  SpO2: 95 % (22 0800)   Vital Signs (24h Range):  Temp:  [94.5 °F (34.7 °C)-98.4 °F (36.9 °C)] 97.9 °F (36.6 °C)  Pulse:  [] 82  Resp:  [11-26] 14  SpO2:  [92 %-100 %] 95 %  BP: ()/(56-89) 143/81  Arterial Line BP: (116-155)/(55-76) 152/72     Date 22 0700 - 22 0659   Shift 7549-0313 4031-2358 2739-2059 24 Hour Total   INTAKE   I.V.(mL/kg) 15(0.2)   15(0.2)   IV Piggyback 724   724   Shift Total(mL/kg) 739(9.9)   739(9.9)   OUTPUT   Urine(mL/kg/hr) 2075   Shift Total(mL/kg)  2075(27.7)   2075(27.7)   Weight (kg) 75 75 75 75         Neurosurgery Physical Exam  Aox1 Not following  Left hemibody fullstrength  Right  Upper antigravity except 2/5 triceps  RLE 2/5 IP and Antigravity distal.          Significant Labs:  Recent Labs   Lab 04/28/22 0441 04/28/22 2104 04/29/22 0225 04/29/22 0434 04/29/22  0743   * 158* 172*  --   --      --  150*  149* 147* 152*   K 3.8  --  3.9  --   --     107 113*  --   --    CO2 24 28 26  --   --    BUN 9 8 6*  --   --    CREATININE 0.9 0.8 0.8  --   --    CALCIUM 9.0  --  8.6*  --   --    MG  --   --  2.2  --   --        Recent Labs   Lab 04/28/22 0441 04/28/22 1900 04/28/22 2104 04/29/22 0225   WBC 7.81  7.81  --  7.89 11.09   HGB 13.5*  13.5*  --  11.9* 11.6*   HCT 41.5  41.5 34* 36.3* 36.2*     244  --  230 244       Recent Labs   Lab 04/28/22 0441 04/29/22 0225 04/29/22  0549   INR 3.1* 1.3*  --    APTT  --  33.0* 33.1*       Microbiology Results (last 7 days)       Procedure Component Value Units Date/Time    Blood culture [[id number]] Collected: 04/28/22 1201    Order Status: Completed Specimen: Blood from Peripheral, Hand, Left Updated: 04/28/22 1912     Blood Culture, Routine No Growth to date    Blood culture [[id number]] Collected: 04/28/22 1201    Order Status: Completed Specimen: Blood from Peripheral, Forearm, Right Updated: 04/28/22 1912     Blood Culture, Routine No Growth to date          All pertinent labs from the last 24 hours have been reviewed.    Significant Diagnostics:    Imaging Results              SURG FL Surgery Fluoro Usage (Final result)  Result time 04/28/22 19:52:20      Final result by Access, Silent  (04/28/22 19:52:20)                   Narrative:    See OP Notes for results.     IMPRESSION: See OP Notes for results.             This procedure was auto-finalized by: Virtual [facility]iologist                                      MRI Cervical Spine W WO Cont (Final result)  Result time  04/28/22 11:30:13   Procedure changed from MRI Cervical Spine With Contrast     Final result by Rhett Conway MD (04/28/22 11:30:13)                   Impression:      1. Abnormal nonspecific posterior epidural fluid, differential diagnosis includes hematoma and abscess.  This in conjunction with juxta-articular posterior soft tissue edema particularly on left and inter spinous process region, superimposed muscle strain posttraumatic change favored in may be responsible for the posterior epidural fluid which could represent hematoma.  No osteomyelitis or septic facet joint disease or discitis or acute fracture noted on CTA neck exam.  2. Prominent degenerative disc spondylosis facet joint arthropathy changes particularly C3 through C6 levels.  3.  This report was flagged in Epic as abnormal.  4. Neuro surgical consultation suggested as well as clinical correlation.    COMMUNICATION  This critical result was discovered/received at 11:15 04/28/2022.  The critical information above was relayed directly by me by telephone to Dr Bay on 04/28/2022 at 1229 hours.      Electronically signed by: Rhett Conway MD  Date:    04/28/2022  Time:    11:30               Narrative:    EXAMINATION:  MRI CERVICAL SPINE W WO CONTRAST    CLINICAL HISTORY:  cervical spine extending from C3-C6 that may be artifact however the appearance is most concerning for the possibility of an intraspinal epidural process to include the possibility of epidural fluid collection, abscess or hematoma;    TECHNIQUE:  MRI cervical spine without and with intravenous Gadavist 8 cc.    COMPARISON:  CT and MRI imaging of brain, cervical spine dating back 2014.    FINDINGS:  Levoscoliosis cervicothoracic junction with tilting head right.  DJD chronic change anterior C1-C2 with mild hypertrophy transverse ligament and mild indentation adjacent cranial cervical junction.  Marrow space normal.  Degenerative disc spondylosis particularly to C4  through C6.  No fracture subluxation.    Posterior juxta-articular edema noted about left facet joints from C2 through C7 levels as well as between posterior spinous processes, limited similar juxta-articular edema right posterior lateral facet joint structures.  Abnormality right upper lobe suprahilar region, please refer to CTA chest same day report.    Posterior epidural fluid noted to extend from C2-C3-T1 level with isointense signal spinal cord on T1, heterogeneous increased signal on T2 and FLAIR and limited enhancement of margins on post contrasted exam.  Study obscured by motion artifact particularly postcontrast exam.  Overall exam is of satisfactory diagnostic quality.    C2-C3 posterior disc normal, facet joint arthropathy with mild moderate right foramen and no left foramen and mild spinal stenosis.    C3-C4 mild moderate posterior central disc desiccated bulge, mild compression anterior spinal cord, moderate soft tissue spinal stenosis in conjunction with posterior epidural compression of spinal cord mild moderate degree.    C4-C5 mild moderate posterior disc bulge spondylosis, mild compression anterior spinal cord, severe compression posterior spinal cord by epidural fluid particularly on right, facet joint arthropathy with mild moderate foraminal and moderate severe soft tissues spinal stenosis.    C5-C6 mild moderate posterior disc bulge spondylosis, paracentral prominent left, mild moderate compression anterior spinal cord, moderate compression posterior spinal cord by epidural fluid, facet joint arthropathy with mild moderate foramen stenosis particularly on left, moderate soft tissue spinal stenosis.    C6-C7 mild moderate posterior disc bulge spondylosis, posterior central annular fissure, moderate compression anterior spinal cord, additional mild moderate compression posterior spinal cord by a epidural fluid, facet joint arthropathy, mild moderate foraminal stenosis more prominent on left,  moderate soft tissue spinal stenosis.    C7-T1 posterior disc margin normal.  Some hypertrophy posterior spinal ligament, mild moderate compression posterior spinal sac, mild moderate soft tissue spinal stenosis.    Posterior spinal fluid collection extends to level of T1-T2 with mild moderate compression posterior spinal sac and mild moderate spinal stenosis.  Upper dorsal spinal cord and spinal canal otherwise appears intact.    No additional abnormal intra-axial extra-axial enhancement post-contrast exam.  No definite abnormal signal within spinal cord.                                       MRI Brain W WO Contrast (Final result)  Result time 04/28/22 10:49:28   Procedure changed from MRI Brain Without Contrast     Final result by Rhett Conway MD (04/28/22 10:49:28)                   Impression:      New metal opacities within posterior frontal high convexity scalp, perhaps representing post laceration therapy and clinical correlation requested.    MRI brain otherwise stable and unchanged.  No new stroke hemorrhage or subdural fluid.    Numerous areas of supratentorial gliosis.      Electronically signed by: Rhett Conway MD  Date:    04/28/2022  Time:    10:49               Narrative:    EXAMINATION:  MRI BRAIN W WO CONTRAST    CLINICAL HISTORY:  right sided weakness;    TECHNIQUE:  Multiplanar multisequence MR imaging of the brain was performed before and after the administration of 8 mL Gadavist intravenous contrast.    COMPARISON:  CTA head and neck, CT head same day and MRI CT brain studies dating back 2021 and CT soft tissue neck 2016    FINDINGS:  New metal scalp artifacts posterior frontal high convexity appearing since CT same day 03:50, could represent post laceration therapy and clinical correlation requested.    Central and cortical atrophy remains prominent particularly left posterior frontal lobe cortex.    Numerous scattered foci deep subcortical white matter gliosis particularly  frontal and lesser degree parietooccipital more prominent on right, involvement of upper basal ganglia central semi ovale, subinsular cortex again more prominent on right.  Small foci increased signals cerebral peduncle and possibly vague area of linea are subtle gliosis beneath floor 4th ventricle.  Diffusion and gradient echo studies negative.    No new intra-axial or extra-axial enhancement of brain, hemorrhage, subdural fluid, mass or acute stroke.    Nasal septal deviation spur projects left with some mucosal hypertrophy of ethmoid sinuses.  Distal vertebrobasilar distal internal carotid arteries and major branches patent flow void.                                        CTA Head and Neck (xpd) (Final result)  Result time 04/28/22 05:38:06      Final result by Rigoberto De Jesus MD (04/28/22 05:38:06)                   Impression:      The major intracranial and extracranial arterial vascular structures of the head and neck demonstrate no evidence for high-grade stenosis or occlusion, or dissection, and there is no evidence for intracranial aneurysm or AVM.    There is a finding of for a to the cervical spine extending from C3-C6 that may be artifact however the appearance is most concerning for the possibility of an intraspinal epidural process to include the possibility of epidural fluid collection, abscess or hematoma, clinical and historical correlation is needed, MRI examination of the cervical spine with contrast is recommended.    This report was flagged in Epic as abnormal.    The findings were reported to Dr Joyce in the ER at the time of dictation.      Electronically signed by: Rigoberto De Jesus  Date:    04/28/2022  Time:    05:38               Narrative:    EXAMINATION:  CTA HEAD AND NECK (XPD)    CLINICAL HISTORY:  Neuro deficit, acute, stroke suspected;    TECHNIQUE:  Non contrast low dose axial images were obtained through the head.  CT angiogram was performed from the level of the nae to the  top of the head following the IV administration of 100mL of Omnipaque 350.   Sagittal and coronal reconstructions and maximum intensity projection reconstructions were performed. Arterial stenosis percentages are based on NASCET measurement criteria.    COMPARISON:  CT examination of the brain without contrast April 28, 2022, CT examination of the chest without contrast April 28, 2022    FINDINGS:  The vertebral arteries bilaterally demonstrate appropriate opacification extending to the level of the basilar artery which also demonstrates appropriate opacification.  The P1 segment of the posterior cerebral artery bilaterally appears hypoplastic, and there appear to be bilateral posterior communicating arteries, otherwise the PCA bilaterally demonstrate appropriate opacification.    The common, external and internal carotid arteries bilaterally demonstrate appropriate opacification, the internal carotid arteries demonstrate opacification to the level of the [id number].  There is no evidence for high-grade stenosis or occlusion or dissection of the internal carotid arteries.  The anterior cerebral artery and middle cerebral artery bilaterally demonstrate appropriate opacification.  There is suggestion of a thin small anterior communicating artery.    There is no evidence for intracranial aneurysm or AVM.  The intracranial venous structures appear appropriate.    The visualized orbits appear appropriate.  The paranasal sinuses and mastoid air cells appear appropriate.  The parapharyngeal space bilaterally appears clear, there is no evidence for retropharyngeal abscess.  There is no abnormal thickening of the epiglottis.  The parotid, submandibular and thyroid glands appear appropriate.  The visualized osseous structures demonstrate chronic change.  Findings referable to the lung apices are discussed on the CT examination of the chest of the same date, referral to the chest CT report is recommended.    As best seen  on axial series 3 and axial series 2 along the posterior aspect of the spinal canal, and appearing to extend to the right there is subtle suggestion of accentuated attenuation, along the cervical spinal canal extending from approximately C3 to C6.  This is also suggested on sagittal series 602.  This may be artifactual however an intraspinal epidural process to include epidural fluid collection, including the possibility of intraspinal epidural hematoma or abscess cannot be excluded based upon the appearance.  MRI examination of the cervical spine with contrast is recommended for further evaluation.                                        CTA Chest Abdomen Pelvis (Final result)  Result time 04/28/22 05:38:52   Procedure changed from CTA Chest Abdomen Non Coronary     Final result by Rigoberto De Jesus MD (04/28/22 05:38:52)                   Impression:      The thoracic and abdominal aorta demonstrate no evidence for aneurysmal dilatation, aortic dissection or acute aortic injury.    Intrathoracic findings appears stable when compared to the CT examination of the chest of the same date, referral to the chest CT of the same date report is recommended.    Diverticula of the colon are noted in there is a segment of the sigmoid colon demonstrating mild wall thickening, this may relate to concentric muscular hypertrophy of diverticulosis however correlation for mild diverticulitis is needed, if the patient has not had recent colon screening exam I would recommend follow-up colonoscopy or fluoroscopic barium examination to exclude neoplasm.    Irregular contour of the liver again noted, correlation for chronic hepatic disease is needed.    Cholelithiasis is noted, there is no evidence for pericholecystic inflammatory change.    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    This report was flagged in Epic as abnormal.    The findings were reported to Dr Joyce in the ER at the time of  dictation.      Electronically signed by: Rigoberto De Jesus  Date:    04/28/2022  Time:    05:38               Narrative:    EXAMINATION:  CTA CHEST ABDOMEN PELVIS    CLINICAL HISTORY:  Aortic dissection suspected;    TECHNIQUE:  CT examination of the chest abdomen and pelvis was performed via aortic angiographic protocol after the administration of 100 mL Omnipaque 350 intravenous contrast, timing of imaging optimized for evaluation of the aorta.  Axial imaging, sagittal and coronal reconstruction imaging is submitted.  Axial MIP imaging ting of the chest is submitted.    COMPARISON:  CT examination of the chest without contrast April 28, 2022, CTA examination chest abdomen and pelvis October 15, 2018    FINDINGS:  The thoracic and abdominal aorta demonstrate appropriate opacification, there is no evidence for acute aortic leak, aortic dissection, or aneurysmal dilatation and no evidence for periaortic hematoma.  The visualized great vessels appear appropriate.  The pulmonary arterial vasculature appear appropriate.  The celiac artery, hepatic artery, and splenic artery demonstrate appropriate opacification as does the superior mesenteric artery which demonstrates mild narrowing at its origin without high-grade stenosis.  The renal arteries, inferior mesenteric arteries and iliac arterial vasculature appear appropriate.    Intrathoracic findings including cavitary lesion and abnormal areas of multifocal opacity and nodularity of the right lung appear stable when compared to the prior examination of the same date, the left hemithorax appears stable as well, referral to the earlier chest CT report is recommended.  Mediastinal adenopathy is again noted.  There is no pericardial effusion, there is no hemopericardium, there is no hemothorax.  There is no pneumothorax.    Diminished attenuation of the liver consistent with diffuse fatty infiltrate is noted, irregular contour of the liver again noted, correlation for chronic  hepatic disease is needed.  Cholelithiasis is noted, there is no evidence for pericholecystic or peripancreatic inflammatory change.  The stomach appears unremarkable for degree of distention.  There is no evidence for acute process of the spleen or adrenal glands.  There is no hydronephrosis or obstructive uropathy bilaterally.  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  The urinary bladder appears unremarkable for degree of distention.    There is no evidence for small bowel obstructive process.  The appendix is identified, it does not appear inflamed.  Mild prominence of the colon with stool is noted.  Diverticula of the colon are noted.  There is a segment of the sigmoid colon as best seen on axial series 2, images 770 through 809 that demonstrates mild wall thickening, this may relate to concentric muscular hypertrophy of diverticulosis, as significant pericolonic inflammatory change is not appreciated however correlation for signs or symptoms of mild colitis or diverticulitis is needed, if the patient has not had recent colon screening exam I would recommend follow-up colonoscopy or fluoroscopic barium exam to exclude the possibility of neoplasm.  There is no additional evidence for colonic inflammatory or obstructive change.  There is no free intraperitoneal air.  The visualized osseous structures demonstrate chronic change.                                       CT Chest Without Contrast (Final result)  Result time 04/28/22 04:26:44      Final result by Rigoberto De Jesus MD (04/28/22 04:26:44)                   Impression:      Previously identified right upper lobe cavitary lung lesion again noted, diminishing size, in addition there are multifocal areas of pulmonary opacity, areas of consolidation, airspace disease, and nodularity, involving the right hemithorax, overall demonstrating mild improvement.    Chronic lung changes bilaterally are  again noted, and the left hemithorax appears stable.    Mediastinal adenopathy appears stable.    Findings referable to the liver for which correlation for chronic hepatic disease is needed again noted.    Cholelithiasis, there is no evidence for pericholecystic inflammatory change.    Mild pericardial thickening or fluid anteriorly, without large pericardial effusion.      Electronically signed by: Rigoberot De Jesus  Date:    04/28/2022  Time:    04:26               Narrative:    EXAMINATION:  CT CHEST WITHOUT CONTRAST    CLINICAL HISTORY:  back pain, neurologic deficits;    TECHNIQUE:  Low dose axial images, sagittal and coronal reformations were obtained from the thoracic inlet to the lung bases. Contrast was not administered.  The lack of intravenous contrast diminishes the sensitivity of the examination.    COMPARISON:  CT examination of the chest without contrast November 2, 2021    FINDINGS:  The previously identified cavitary lesion of the right upper lobe is again noted, it appears diminished in size when compared to the prior examination, when measured at a similar level to the prior study it measures approximately 1.7 by 3.4 cm on axial imaging compared to 3.3 x 4.4 cm on the prior exam.  There is appearance consistent with bronchial communication to the cavitary lesion, appearing similar to the prior exam.  This is somewhat more conspicuous at this time.  There is continued appearance of surrounding patchy and confluent and nodular opacities with multifocal airspace opacities and of the right lung, the overall pattern and distribution is similar to the prior study, however the overall appearance is that of improvement.    Chronic appearing lung changes are again noted.  There are mild atelectatic changes noted as well.  The left hemithorax appears stable.  There is no evidence for significant pleural effusion and there is no evidence for pneumothorax.    Mediastinal adenopathy is again noted appearing  stable.  There is mild greater thickening or fluid along the pericardium anteriorly.  Otherwise the appearance of the heart and great vessels is stable, evaluation is limited on this noncontrast exam.    Limited imaging of the upper abdomen demonstrates evidence for cholelithiasis without evidence for pericholecystic inflammatory change.  Irregular contour of the liver again noted, correlation for chronic hepatic disease is needed.  The visualized osseous structures appear intact.  Chronic changes are noted.                                       CT Head Without Contrast (Final result)  Result time 04/28/22 04:07:27      Final result by Rigoberto De Jesus MD (04/28/22 04:07:27)                   Impression:      Chronic changes are noted, there is no evidence for superimposed acute intracranial process.      Electronically signed by: Rigoberto De Jesus  Date:    04/28/2022  Time:    04:07               Narrative:    EXAMINATION:  CT HEAD WITHOUT CONTRAST    CLINICAL HISTORY:  Neuro deficit, acute, stroke suspected;    TECHNIQUE:  Low dose axial images were obtained through the head.  Coronal and sagittal reformations were also performed. Contrast was not administered.    COMPARISON:  CT examination of the brain without contrast February 2, 2021    FINDINGS:  The ventricular system, sulcal pattern and parenchymal attenuation characteristics are consistent with chronic change.  Involutional changes noted, chronic appearing white matter change noted.  There is no evidence for superimposed acute process.  There is no evidence for intracranial mass, mass effect or midline shift, there is no evidence for acute intracranial hemorrhage.  Appropriate CSF spaces are seen at the skull base.    The visualized orbits appear intact.  The mastoid air cells and middle ear cavity bilaterally appear appropriate, as do the paranasal sinuses.  The visualized osseous structures appear intact.                                        Physical  Exam    Assessment/Plan:     * Epidural hematoma  82 y/o male with APS on coumadin, hx PE, MAC pneumonia, HLD with spontaneous C3-C6 epidural hematoma compressing the spinal cord    S/p  C3-C6 laminectomy and fusion for evacuation of epidural hematoma     Na management as medical team   CT head STAT ordered and pending   MAPS  > 80   Contiue steroids   Maintain Drains     Discussed with Dr. Paul Nam MD  Neurosurgery  [facility] - Neuro Critical Care

## 2022-04-29 NOTE — PROGRESS NOTES
CT called back to obtain time slot for scan. Currently multiple ED STATS lined up. Tech to call back.

## 2022-04-29 NOTE — SUBJECTIVE & OBJECTIVE
Past Medical History:   Diagnosis Date    Antiphospholipid syndrome 11/19/2021    Dysphagia     Hx of colonic polyps     followed by GI. Dr. Pham    Hyperlipidemia LDL goal <100     Overweight(278.02)     Prostate cancer september 2011    followed by urology, Dr. Rogers    Type II or unspecified type diabetes mellitus without mention of complication, not stated as uncontrolled     diet controlled     Past Surgical History:   Procedure Laterality Date    BRONCHOSCOPY N/A 10/15/2018    Procedure: BRONCHOSCOPY;  Surgeon: [facility] Diagnostic Provider;  Location: [facility] OR [address] [place] [address];  Service: Anesthesiology;  Laterality: N/A;    CATARACT EXTRACTION, BILATERAL  2014    PROSTATECTOMY        No current facility-administered medications on file prior to encounter.     Current Outpatient Medications on File Prior to Encounter   Medication Sig Dispense Refill    albuterol (PROVENTIL/VENTOLIN HFA) 90 mcg/actuation inhaler INHALE 2 PUFFS BY MOUTH EVERY 6 HOURS AS NEEDED FOR WHEEZING 9 g 0    ascorbic acid, vitamin C, (VITAMIN C) 1000 MG tablet Take 1,000 mg by mouth once daily.      aspirin (ECOTRIN) 81 MG EC tablet Take 81 mg by mouth.      b complex vitamins capsule Take 1 capsule by mouth once daily.      ethambutoL (MYAMBUTOL) 400 MG Tab Take 2 tablets (800 mg total) by mouth once daily. 60 tablet 5    mucus clearing device (AEROBIKA OSCILLATING PEP SYSTM) by Misc.(Non-Drug; Combo Route) route 2 (two) times a day. 1 Device 0    nebulizer and compressor (OMBRA COMPRESSOR SYSTEM) Marcy use to administer nebulized medication as directed 1 each 0    omega 3-dha-epa-fish oil 1,000 (120-180) mg Cap Take 1 capsule by mouth once daily.      omega-3 fatty acids 1,000 mg Cap Take 2 g by mouth.      omeprazole 20 mg TbEC 1/2 tablet daily in am as needed. 45 each 0    pravastatin (PRAVACHOL) 10 MG tablet Take 1 tablet (10 mg total) by mouth once daily. 90 tablet 0    promethazine-dextromethorphan (PROMETHAZINE-DM) 6.25-15 mg/5 mL Syrp Take  10-15ml by mouth every 8 hours as needed for coughing 240 mL 0    sodium chloride 3% 3 % nebulizer solution USE 4 ML VIAL IN NEBULIZER  TWICE DAILY 480 mL 11    warfarin (COUMADIN) 3 MG tablet Take 1.5 tablets (4.5 mg total) by mouth Daily. As directed by coumadin clinic 45 tablet 11      Allergies: Patient has no known allergies.    Family History   Problem Relation Age of Onset    Colon cancer Mother     Diabetes Mother     Heart disease Father     Mental illness Sister     Diabetes Sister     Other Brother         polio as a child    Pulmonary fibrosis Brother     Diabetes Unknown     Diabetes Brother     Myasthenia gravis Brother     Parkinsonism Brother     Diabetes Brother     Dementia Brother     Lung cancer Brother         smoker    Diabetes Maternal Aunt      Social History     Tobacco Use    Smoking status: Former Smoker     Packs/day: 0.25     Years: 5.00     Pack years: 1.25     Quit date: 10/2/1962     Years since quittin.6    Smokeless tobacco: Never Used    Tobacco comment: quit age 23   Substance Use Topics    Alcohol use: Yes     Comment: beer occasionally    Drug use: No     Review of Systems   Unable to perform ROS: Acuity of condition (groggy and confused post op, expected per family bedside)   Constitutional:  Negative for chills and fever.   HENT:  Positive for trouble swallowing. Negative for rhinorrhea.    Eyes:  Negative for photophobia.   Respiratory:  Negative for shortness of breath and wheezing.    Cardiovascular:  Negative for chest pain and palpitations.   Gastrointestinal:  Negative for abdominal pain, nausea and vomiting.   Genitourinary:  Negative for difficulty urinating (ordinarily no difficulty).   Musculoskeletal:  Positive for myalgias and neck pain.   Skin:  Negative for rash.   Neurological:  Positive for speech difficulty and weakness. Negative for numbness and headaches.   Hematological:  Bruises/bleeds easily.        APA   Psychiatric/Behavioral:  Positive for  "confusion. Negative for agitation.    Objective:     Vitals:    Temp: 97.4 °F (36.3 °C)  Pulse: 77  Rhythm: normal sinus rhythm  BP: (!) 109/56  MAP (mmHg): 77  Resp: 19  SpO2: 95 %  O2 Device (Oxygen Therapy): nasal cannula    Temp  Min: 94.5 °F (34.7 °C)  Max: 98.7 °F (37.1 °C)  Pulse  Min: 45  Max: 97  BP  Min: 86/68  Max: 167/80  MAP (mmHg)  Min: 77  Max: 116  Resp  Min: 8  Max: 25  SpO2  Min: 92 %  Max: 100 %    04/28 0701 - 04/29 0700  In: 3127.1 [I.V.:372.2]  Out: 3000 [Urine:2850]           Physical Exam  Constitutional:       General: He is not in acute distress.  HENT:      Head: Normocephalic and atraumatic.      Right Ear: External ear normal.      Left Ear: External ear normal.      Nose: Nose normal. No rhinorrhea.      Mouth/Throat:      Mouth: Mucous membranes are moist.      Pharynx: Oropharynx is clear.   Eyes:      General:         Right eye: No discharge.         Left eye: No discharge.      Conjunctiva/sclera: Conjunctivae normal.   Neck:      Comments: C collar in place  Cardiovascular:      Rate and Rhythm: Normal rate and regular rhythm.      Pulses: Normal pulses.   Pulmonary:      Effort: Pulmonary effort is normal. No respiratory distress.      Breath sounds: No stridor. No wheezing.   Abdominal:      General: Abdomen is flat. There is no distension.      Palpations: Abdomen is soft. There is no mass.      Tenderness: There is no abdominal tenderness (said yes but could not say where, was also repeating "yes", will re evaluate). There is no guarding.   Musculoskeletal:         General: No swelling, deformity or signs of injury.      Cervical back: Neck supple.   Skin:     General: Skin is warm and dry.      Findings: No rash.   Neurological:      Mental Status: He is alert.      Comments: Sedation: none but groggy post op (upon reassessment later in evening moving all 4 extremities more, still disoriented)  GCS [id number]  AAO to self, age; not year, location, situation  Attempts to follow " commands on UE for thumbs up and opens eyes wide  Pupils 5mm and reactive, L more brisk than R, round bilat  EOMs limited due to c collar in place and pt not following all commands; able to look leftward, downward, upward, but R eye appears limited looking rightward (left eye crosses midline)  Pt able to name how many fingers I was holding in front of him, blinks to threat on L, did not blink on R  + spont cough and swallow, corneals deferred on awake pt  RUE somewhat contracted but able to passively extend 2/5, LUE normal tone mild spasticity noted 3/5  RLE normal tone 1/5, LLE 3/5 normal tone  SILT x 4       Psychiatric:         Mood and Affect: Mood normal.     Unable to test orientation, language, memory, judgment, insight, fund of knowledge, hearing, shoulder shrug, tongue protrusion, coordination, gait due to level of consciousness.    Today I personally reviewed pertinent medications, lines/drains/airways, imaging, cardiology results, laboratory results, microbiology results, notably:    CTA

## 2022-04-29 NOTE — NURSING
[facility] >> Surgery     Type of stroke/diagnosis: Cervical Epidural Hematoma    TPA start and end time (if applicable)    Thrombectomy start and end time (if applicable)    Current symptoms: drowsy, moaning, headache    Skin assessment done: Yes  Wounds noted: none    *If wounds noted, was Wound Care consulted? n/a    Guzman Completed? pending    Patient Belongings on Admit: dentures    NCC notified: ELISA Tobar

## 2022-04-29 NOTE — SUBJECTIVE & OBJECTIVE
Past Medical History:   Diagnosis Date    Antiphospholipid syndrome 11/19/2021    Dysphagia     Hx of colonic polyps     followed by GI. Dr. Pham    Hyperlipidemia LDL goal <100     Overweight(278.02)     Prostate cancer september 2011    followed by urology, Dr. Rogers    Type II or unspecified type diabetes mellitus without mention of complication, not stated as uncontrolled     diet controlled       Past Surgical History:   Procedure Laterality Date    BRONCHOSCOPY N/A 10/15/2018    Procedure: BRONCHOSCOPY;  Sur[facility]: [facility] Diagnostic Provider;  Location: [facility] OR [address] [place] [address];  Service: Anesthesiology;  Laterality: N/A;    CATARACT EXTRACTION, BILATERAL  2014    POSTERIOR CERVICAL LAMINECTOMY Bilateral 4/28/2022    Procedure: LAMINECTOMY, SPINE, CERVICAL, POSTERIOR APPROACH C3-6;  Surgeon: Carlos Miller MD;  Location: [facility] OR [address] [place] [address];  Service: Neurosurgery;  Laterality: Bilateral;    PROSTATECTOMY         Review of patient's allergies indicates:  No Known Allergies    Medications:  Medications Prior to Admission   Medication Sig    albuterol (PROVENTIL/VENTOLIN HFA) 90 mcg/actuation inhaler INHALE 2 PUFFS BY MOUTH EVERY 6 HOURS AS NEEDED FOR WHEEZING    ascorbic acid, vitamin C, (VITAMIN C) 1000 MG tablet Take 1,000 mg by mouth once daily.    aspirin (ECOTRIN) 81 MG EC tablet Take 81 mg by mouth.    b complex vitamins capsule Take 1 capsule by mouth once daily.    ethambutoL (MYAMBUTOL) 400 MG Tab Take 2 tablets (800 mg total) by mouth once daily.    mucus clearing device (AEROBIKA OSCILLATING PEP SYSTM) by Misc.(Non-Drug; Combo Route) route 2 (two) times a day.    nebulizer and compressor (OMBRA COMPRESSOR SYSTEM) Marcy use to administer nebulized medication as directed    omega 3-dha-epa-fish oil 1,000 (120-180) mg Cap Take 1 capsule by mouth once daily.    omega-3 fatty acids 1,000 mg Cap Take 2 g by mouth.    omeprazole 20 mg TbEC 1/2 tablet daily in am as needed.    pravastatin (PRAVACHOL) 10 MG tablet Take 1  tablet (10 mg total) by mouth once daily.    promethazine-dextromethorphan (PROMETHAZINE-DM) 6.25-15 mg/5 mL Syrp Take 10-15ml by mouth every 8 hours as needed for coughing    sodium chloride 3% 3 % nebulizer solution USE 4 ML VIAL IN NEBULIZER  TWICE DAILY    warfarin (COUMADIN) 3 MG tablet Take 1.5 tablets (4.5 mg total) by mouth Daily. As directed by coumadin clinic     Antibiotics (From admission, onward)                Start     Stop Route Frequency Ordered    04/29/22 0000  cefazolin 1g in dextrose 5% 50 mL IVPB (ready to mix system)         -- IV Every 8 hours (non-standard times) 04/28/22 2045 04/28/22 2100  mupirocin 2 % ointment         05/03 2059 Nasl 2 times daily 04/28/22 2048 04/28/22 0900  ethambutoL tablet 800 mg         -- Oral Daily 04/28/22 0601 04/28/22 0900  azithromycin tablet 500 mg         -- Oral Daily 04/28/22 0601          Antifungals (From admission, onward)                None          Antivirals (From admission, onward)      None             Immunization History   Administered Date(s) Administered    COVID-19, MRNA, LN-S, PF (Pfizer) (Purple Cap) 01/13/2021, 04/28/2021, 11/05/2021    Influenza (FLUAD) - Trivalent - Adjuvanted - PF (65+) 09/25/2019    Influenza - High Dose - PF (65 years and older) 09/18/2015, 10/04/2016, 10/23/2017, 09/21/2018    Influenza - Quadrivalent - High Dose - PF (65 years and older) 11/05/2021    Influenza - Quadrivalent - PF *Preferred* (6 months and older) 10/06/2014    Influenza A (H1N1) 2009 Monovalent - IM 01/22/2010    Influenza Split 10/10/2011, 10/06/2014    Pneumococcal Conjugate - 13 Valent 10/28/2015    Pneumococcal Polysaccharide - 23 Valent 07/12/2013    Tdap 03/08/2015    Zoster Recombinant 09/25/2019, 12/30/2019       Family History       Problem Relation (Age of Onset)    Colon cancer Mother    Dementia Brother    Diabetes Mother, Sister, , Brother, Brother, Maternal Aunt    Heart disease Father    Lung cancer Brother    Mental illness  Sister    Myasthenia gravis Brother    Other Brother    Parkinsonism Brother    Pulmonary fibrosis Brother          Social History     Socioeconomic History    Marital status:     Number of children: 2   Occupational History    Occupation: tool    Tobacco Use    Smoking status: Former Smoker     Packs/day: 0.25     Years: 5.00     Pack years: 1.25     Quit date: 10/2/1962     Years since quittin.6    Smokeless tobacco: Never Used    Tobacco comment: quit age 23   Substance and Sexual Activity    Alcohol use: Yes     Comment: beer occasionally    Drug use: No    Sexual activity: Yes     Partners: Female     Review of Systems   Unable to perform ROS: Mental status change   Objective:     Vital Signs (Most Recent):  Temp: 98.6 °F (37 °C) (22 1100)  Pulse: 78 (22 1359)  Resp: 16 (22 1359)  BP: (!) 146/65 (22 1300)  SpO2: 96 % (22 1359)   Vital Signs (24h Range):  Temp:  [94.5 °F (34.7 °C)-98.6 °F (37 °C)] 98.6 °F (37 °C)  Pulse:  [] 78  Resp:  [11-26] 16  SpO2:  [95 %-100 %] 96 %  BP: (103-152)/(56-89) 146/65  Arterial Line BP: (116-155)/(55-76) 146/64     Weight: 74.8 kg (165 lb)  Body mass index is 24.37 kg/m².    Estimated Creatinine Clearance: 74.9 mL/min (based on SCr of 0.8 mg/dL).    Physical Exam  Vitals and nursing note reviewed.   Constitutional:       General: He is sleeping. He is not in acute distress.     Appearance: He is ill-appearing.      Interventions: Cervical collar and nasal cannula in place.   HENT:      Mouth/Throat:      Mouth: Mucous membranes are moist.      Pharynx: Oropharynx is clear.   Cardiovascular:      Rate and Rhythm: Normal rate and regular rhythm.      Pulses: Normal pulses.      Heart sounds: Normal heart sounds.   Pulmonary:      Effort: Pulmonary effort is normal. No respiratory distress.      Breath sounds: Normal breath sounds. No wheezing or rhonchi.   Abdominal:      General: Abdomen is protuberant. There is no  distension.      Palpations: Abdomen is soft.      Tenderness: There is no abdominal tenderness.   Skin:     General: Skin is warm and dry.      Capillary Refill: Capillary refill takes less than 2 seconds.      Findings: Bruising present. No lesion or rash.   Neurological:      Comments: R sided weakness       Significant Labs: All pertinent labs within the past 24 hours have been reviewed.    Significant Imaging: I have reviewed all pertinent imaging results/findings within the past 24 hours.

## 2022-04-29 NOTE — OP NOTE
[facility] - Neuro Critical Care  Neurosurgery  Operative Note    OP Note      Date of Procedure: 4/28/2022       Pre-Operative Diagnosis:  Cervical epidural hematoma    Post-Operative Diagnosis:  Cervical epidural hematoma  Anesthesia: General    Procedures performed:  Posterior approach for a C3, C4, C5, and C6 laminectomy in medial facetectomy for decompression of spinal cord 2. Removal epidural hematoma 3. C3-4, C4-5, C5-6 posterolateral instrumented fusion morselized allograft local autograft from laminectomy     Surgeon: Carlos Miller MD    Assistant::  Steve No MD    Indication for Procedure:  This is a 81-year-old male who had acute onset of neck pain with multiple medical comorbidities but on anticoagulation was found to be be super thin therapeutic found have a large epidural hematoma with quadriparesis.  Patient was taken emergently for decompression and stabilization    Operative Note:  Patient was anesthetized intubated by anesthesia.  A line was established.  I have matter maintain and steroids were given.  Patient's but in Zamora head pin.  Patient flipped onto the prone position.  Neck was prepped draped sterile fashion.  A midline incision was carried out.  We exposed the spinous process and lamina and lateral mass of C3 all the way down to C6.  The interspinous possible the of ligament was removed.  We completed a C3-C4 C5 and C6 decompressive laminectomy medial facetectomy using high-speed drill.  We encountered large epidural hematoma which we evacuated.  We obtained hemostasis and FloSeal the lateral gutters.  We waxed the bone edges.  We then used anatomic landmarks to place lateral mass screws bilaterally at C3-C4 C5 and C6.  We decorticate the facet joints and lateral mass with laid down morselized allograft and local autograft from the laminectomy.  We we laid down the zia and secured into position.  We placed 2 Hemovac drains in place placed Gelfoam on top of the cord neuro monitoring  improved some but not back to normal.  Multilayer wound closure was obtained.  Patient was taken out of Zamora head pin extubated brought to the neuro ICU without any problems complication.    EBL:  100 cc  Specimen Sent:  Epidural hematoma

## 2022-04-29 NOTE — PT/OT/SLP PROGRESS
Occupational Therapy      Patient Name:  Diego Gunter   MRN:  [id number]    Patient not seen today secondary to Bedrest orders with end time of 2100 and nurse reporting team still awaiting CT and not waiting to perform EOB/OOB activity at this time. Will follow-up on next available date as medically appropriate.    4/29/2022

## 2022-04-29 NOTE — TRANSFER OF CARE
"Anesthesia Transfer of Care Note    Patient: Diego Gunter    Procedure(s) Performed: Procedure(s) (LRB):  LAMINECTOMY, SPINE, CERVICAL, POSTERIOR APPROACH C3-6 (Bilateral)    Patient location: ICU    Anesthesia Type: general    Transport from OR: Transported from OR on room air with adequate spontaneous ventilation and Transported from OR on 6-10 L/min O2 by NC with adequate spontaneous ventilation    Post pain: Adequate analgesia    Post assessment: no apparent anesthetic complications, tolerated procedure well and no evidence of recall    Last vitals:   Visit Vitals  BP (!) 108/56   Pulse (!) 52   Temp 37.1 °C (98.7 °F) (Oral)   Resp 18   Ht 5' 9" (1.753 m)   Wt 72.1 kg (159 lb)   SpO2 95%   BMI 23.48 kg/m²       Post vital signs: stable    Level of consciousness: awake, alert  and oriented    Nausea/Vomiting: no nausea/no vomiting    Complications: none  "

## 2022-04-29 NOTE — PLAN OF CARE
Problem: SLP  Goal: SLP Goal  Description: Speech Language Pathology Goals  Goals expected to be met by 5/6:  1. Patient will tolerate a puree diet and nectar thickened liquids with no overt signs of airway compromise.   2. Patient will answer simple yes/no questions with 50% acc indep.   3. Patient will complete auto speech tasks with 60% acc given min A.   4. Patient will follow simple commands with 60% acc given min A.   5. Patient will participate in further assessments as appropriate.       Outcome: Ongoing, Progressing

## 2022-04-29 NOTE — ASSESSMENT & PLAN NOTE
81 year old male with acute R sided weakness found to have C3-C6 compressive fluid collection on MRI. Underwent spinal decompression and fluid evacuation with NSGY 4/28. ID consulted for management of epidural hematoma vs abscess.     -blood cultures 4/28 prelim NGTD, follow up   -stop cefazolin, will start daptomycin for MRSA and gram+ coverage   -follow up with path sent from surgery

## 2022-04-29 NOTE — PT/OT/SLP PROGRESS
Physical Therapy      Patient Name:  Diego Gunter   MRN:  [id number]    Patient not seen today secondary to MD hold (Comment) (Bed rest).  Pt noted to be on bed rest until 9pm this day, orders acknowledged.   Will follow-up when appropriate.

## 2022-04-29 NOTE — MEDICAL/APP STUDENT
Diego Gunter  81 y.o.    Subjective:  HPI:  Diego Gunter is a 81 y.o. male with past medical history of antiphospholipid syndrome on chronic treatment with coumadin for left PE diagnosed on Jan 2021, as well as DM2, prostate cancer s/p prostatectomy (2011), MAC pneumonia diagnosed Fall 2018, mild cognitive impairment, and hypersensitivity lung disease. He was transferred from OSH for compressive epidural hematoma in the cervical spine for neurosurgical evaluation.   Patient reported that he woke up at midnight and could not move his right arm or leg. He went to  ED and stroke code was initiated; he was not given tPA but given ASA instead. CTA was negative for acute CVA but showed compressive fluid collective from C3-C6, which was confirmed by cervical spine MRI.   He was given Kcentra and Vit K for coumadin reversal, and underwent C3-C6 laminectomy and fusion for evacuation of epidural hematoma on 4/28. He was then admitted to Neuro critical care for monitoring and management. Pt has been partially disoriented and not following commands appropriately ever since the surgery.    Hematology was consulted to help with management of his anticoagulation.  Importantly, pt was admitted on 01/30/2021 with hemoptysis and cough, and was Dx with Covid infection. A CTA was done which revealed small thromboemboli in the left upper lobar artery so he was started on coumadin.   On follow-up, his initial hypercoagulable workup was abnormal, but later on follow-up with Dr Pantoja it was thought that his PE could have been provoked by COVID-19.  Patient agreed to stop anticoagulation at this point (he had completed 3 months of treatment). Also, on 8/2021 CTA showed resolution of PE.  On follow-up again with Dr Pantoja on 11/19/2021, his repeat hypercoagulable workup showed that DRVVT was positive for lupus anticoagulant (which was not previously positive). After reviewing his options it was agreed to re-start treatment with  "Coumadin. It was decided to do bridging with enoxaparin first and re-establish care with coumadin clinic, with a new follow up after 6 months.    Interval History: No acute events overnight. Pt is disoriented and doesnt follow commands appropriately. His wife says hes calm, and hasnt complained of any pain. She states hes been disoriented ever since the surgery.      "Review of Systems   Unable to perform ROS: Mental status change   Constitutional: Negative for fever.   Neurological: Positive for focal weakness.   Psychiatric/Behavioral:        He is confused   He denies feeling any pain.    "Physical Exam  Constitutional:       Appearance: He is ill-appearing.   HENT:      Head: Normocephalic.      Mouth/Throat:      Mouth: Mucous membranes are moist.   Neck:      Comments: Cervical collar in place.  Cardiovascular:      Rate and Rhythm: Normal rate and regular rhythm.      Pulses: Normal pulses.      Heart sounds: Normal heart sounds.   Pulmonary:      Effort: Pulmonary effort is normal.      Breath sounds: Normal breath sounds.   Abdominal:      General: Abdomen is flat. Bowel sounds are normal. There is no distension.      Palpations: Abdomen is soft. There is no mass.      Tenderness: There is no abdominal tenderness.   Skin:     General: Skin is warm.      Capillary Refill: Capillary refill takes less than 2 seconds.   Neurological:      Mental Status: He is alert.      Motor: Weakness present.      Comments: Decreased strength to lower and upper left extremities, with normal passive movement   Psychiatric:      Comments: He is disoriented about his location and situation.     "  Lab Results   Component Value Date    WBC 11.09 04/29/2022    HGB 11.6 (L) 04/29/2022    HCT 36.2 (L) 04/29/2022    MCV 85 04/29/2022     04/29/2022     "  CMP  Sodium   Date Value Ref Range Status   04/29/2022 154 (H) 136 - 145 mmol/L Final     Potassium   Date Value Ref Range Status   04/29/2022 3.9 3.5 - 5.1 mmol/L Final "     Chloride   Date Value Ref Range Status   04/29/2022 113 (H) 95 - 110 mmol/L Final     CO2   Date Value Ref Range Status   04/29/2022 26 23 - 29 mmol/L Final     Glucose   Date Value Ref Range Status   04/29/2022 172 (H) 70 - 110 mg/dL Final     BUN   Date Value Ref Range Status   04/29/2022 6 (L) 8 - 23 mg/dL Final     Creatinine   Date Value Ref Range Status   04/29/2022 0.8 0.5 - 1.4 mg/dL Final     Calcium   Date Value Ref Range Status   04/29/2022 8.6 (L) 8.7 - 10.5 mg/dL Final     Total Protein   Date Value Ref Range Status   04/29/2022 6.5 6.0 - 8.4 g/dL Final     Albumin   Date Value Ref Range Status   04/29/2022 3.6 3.5 - 5.2 g/dL Final     Total Bilirubin   Date Value Ref Range Status   04/29/2022 0.3 0.1 - 1.0 mg/dL Final     Comment:     For infants and newborns, interpretation of results should be based  on gestational age, weight and in agreement with clinical  observations.    Premature Infant recommended reference ranges:  Up to 24 hours.............<8.0 mg/dL  Up to 48 hours............<12.0 mg/dL  3-5 days..................<15.0 mg/dL  6-29 days.................<15.0 mg/dL       Alkaline Phosphatase   Date Value Ref Range Status   04/29/2022 59 55 - 135 U/L Final     AST   Date Value Ref Range Status   04/29/2022 22 10 - 40 U/L Final     ALT   Date Value Ref Range Status   04/29/2022 23 10 - 44 U/L Final     Anion Gap   Date Value Ref Range Status   04/29/2022 11 8 - 16 mmol/L Final     eGFR if    Date Value Ref Range Status   04/29/2022 >60.0 >60 mL/min/1.73 m^2 Final     eGFR if non    Date Value Ref Range Status   04/29/2022 >60.0 >60 mL/min/1.73 m^2 Final     Comment:     Calculation used to obtain the estimated glomerular filtration  rate (eGFR) is the CKD-EPI equation.        Assessment and plan:  Diego Gunter is a 81 y.o. male with PMH of antiphospholipid syndrome on chronic treatment with coumadin, DM2, prostate cancer s/p prostatectomy (2011), MAC  pneumonia (2018), mild cognitive impairment, and hypersensitivity lung disease. He was transferred from [facility] for compressive epidural hematoma in the cervical spine for neurosurgical evaluation. A CVA was suspected initially and he was started on ASA (not tPA), however this was then ruled-out on the CTA. He underwent C3-C6 laminectomy and fusion on 4/28 and was then admitted to Neuro critical care.  Hematology was consulted to help with management of his anticoagulation in the setting of antiphospholipid syndrome. Pt was started on Coumadin on Jan 2021 after a CTA showed a PE when he was hospitalized due to COVID infection. His anticoagulation was stopped after 3 months under the assumption that the embolism could have been caused by COVID and a CTA from 8/2021 showed resolution of PE. He was started again on Coumadin on 11/2021 after his repeat hypercoagulable workup showed that DRVVT was positive for lupus anticoagulant (which was not previously positive).      Recommendations:  - The patients story and labs are compatible with the diagnosis of antiphospholipid syndrome, however he has history of a presumably provoked embolism, so therapeutic anticoagulation could be withheld for now.  - Consider prophylactic anticoagulation.

## 2022-04-29 NOTE — PROGRESS NOTES
CT called for time slot. Currently transitioning from 2nd floor to 1st floor. CT to call back when ready.

## 2022-04-29 NOTE — PLAN OF CARE
[facility] Care Plan    POC reviewed with Diego Gunter at 0300. Pt verbalized understanding. Questions and concerns addressed. No acute events overnight. Pt progressing toward goals. Will continue to monitor. See below and flowsheets for full assessment and VS info.     -Pt dumping urine overnight   -2 L bolus of LR given for DI           Is this a stroke patient? no    Neuro:  Hasmukh Coma Scale  Best Eye Response: 4-->(E4) spontaneous  Best Motor Response: 5-->(M5) localizes pain  Best Verbal Response: 4-->(V4) confused  Roland Coma Scale Score: 13  Assessment Qualifiers: patient not sedated/intubated        24hr Temp:  [94.5 °F (34.7 °C)-98.4 °F (36.9 °C)]     CV:   Rhythm: normal sinus rhythm  BP goals:   SBP < 160  MAP >  80    Resp:   O2 Device (Oxygen Therapy): nasal cannula       Plan: N/A    GI/:     Diet/Nutrition Received: NPO  Last Bowel Movement: 04/27/22  Voiding Characteristics: urethral catheter (bladder)    Intake/Output Summary (Last 24 hours) at 4/29/2022 0716  Last data filed at 4/29/2022 0701  Gross per 24 hour   Intake 5975.9 ml   Output 7505 ml   Net -1529.1 ml          Labs/Accuchecks:  Recent Labs   Lab 04/29/22 0225   WBC 11.09   RBC 4.26*   HGB 11.6*   HCT 36.2*         Recent Labs   Lab 04/29/22 0225 04/29/22  0434   *  149* 147*   K 3.9  --    CO2 26  --    *  --    BUN 6*  --    CREATININE 0.8  --    ALKPHOS 59  --    ALT 23  --    AST 22  --    BILITOT 0.3  --       Recent Labs   Lab 04/29/22 0225 04/29/22  0549   INR 1.3*  --    APTT 33.0* 33.1*      Recent Labs   Lab 04/29/22 0225   *   CPKMB 4.6   MB 1.9       Electrolytes: Electrolytes replaced  Accuchecks: Q6H    Gtts:   sodium chloride 0.45%      dexmedetomidine (PRECEDEX) infusion Stopped (04/29/22 0310)       LDA/Wounds:  Lines/Drains/Airways       Drain  Duration                  Closed/Suction Drain 04/28/22 1918 Left Back Accordion 10 Fr. <1 day         Closed/Suction Drain 04/28/22 1918  Right Back Accordion 10 Fr. <1 day         Urethral Catheter 04/28/22 1650 Double-lumen;Non-latex;Straight-tip 16 Fr. <1 day              Arterial Line  Duration             Arterial Line 04/28/22 1600 Left <1 day              Peripheral Intravenous Line  Duration                  Peripheral IV - Single Lumen 04/28/22 1652 18 G Left Forearm <1 day         Peripheral IV - Single Lumen 04/29/22 0000 18 G Posterior;Right Hand <1 day         Peripheral IV - Single Lumen 04/29/22 0015 20 G Anterior;Right Upper Arm <1 day                  Wounds: Yes  Wound care consulted: No

## 2022-04-29 NOTE — PLAN OF CARE
[facility] - Neuro Critical Care  Initial Discharge Assessment       Primary Care Provider: Portia Ferreira MD    Admission Diagnosis: Epidural abscess [G06.2]  Arm pain [M79.603]  Back pain [M54.9]  Epidural hematoma [S06.4X9A]  Stroke [I63.9]  Stroke-like symptoms [R29.90]  Abnormal CT scan, cervical spine [R93.7]    Admission Date: 4/28/2022  Expected Discharge Date: 5/4/2022    Discharge Barriers Identified: None    Payor: [employer] MEDICARE / Plan: [employer] 65 / Product Type: Medicare Advantage /     Extended Emergency Contact Information  Primary Emergency Contact: Addis Gunter  Address: [address] [place] [address] [place] [address] [facility]  Home Phone: [phone]  Work Phone: [phone]  Mobile Phone: [phone]  Relation: Spouse  Secondary Emergency Contact: Jayesh Gunter  Mobile Phone: [phone]  Relation: Grandchild  Preferred language: English   needed? No    Discharge Plan A: Rehab  Discharge Plan B: Skilled Nursing Facility      [facility] [place]  [address]  [address] [facility]  [place] [address]  Phone: [phone] Fax: [phone]      Transferred from:  Home    Past Medical History:   Diagnosis Date    Antiphospholipid syndrome 11/19/2021    Dysphagia     Hx of colonic polyps     followed by GI. Dr. Pham    Hyperlipidemia LDL goal <100     Overweight(278.02)     Prostate cancer september 2011    followed by urology, Dr. Rogers    Type II or unspecified type diabetes mellitus without mention of complication, not stated as uncontrolled     diet controlled         CM met with patient,  Addis Gunter (wife) [phone], and grandsonJayesh,  (at bedside)  in room for Discharge Planning Assessment.  Patient is unable to answer questions.  Per wife, the patient lives with wife in a single story house with 0 step(s) to enter.   Per wife, the patient was independent with ADLS and used no dme for ambulation.   Patient will have assistance from his wife and grandson upon discharge.   Discharge Planning Booklet given to patient/family and discussed.  All questions addressed.  CM will follow for needs.    Initial Assessment (most recent)     Adult Discharge Assessment - 04/29/22 1430        Discharge Assessment    Assessment Type Discharge Planning Assessment     Confirmed/corrected address, phone number and insurance Yes     Confirmed Demographics Correct on Facesheet     Source of Information family     If unable to respond/provide information was family/caregiver contacted? Yes     Contact Name/Number Addis Gunter (wife) [phone] (at bedside)     Communicated MALIA with patient/caregiver Date not available/Unable to determine     Reason For Admission Epidural Hematoma     Lives With spouse     Facility Arrived From: home     Do you expect to return to your current living situation? Yes     Do you have help at home or someone to help you manage your care at home? Yes     Who are your caregiver(s) and their phone number(s)? Addis Gunter (wife) [phone]     Prior to hospitilization cognitive status: Alert/Oriented     Current cognitive status: Unable to Assess     Walking or Climbing Stairs Difficulty none     Dressing/Bathing Difficulty none     Home Accessibility stairs to enter home     Number of Stairs, Main Entrance none     Home Layout Able to live on 1st floor     Equipment Currently Used at Home grab bar;nebulizer     Readmission within 30 days? No     Patient currently being followed by outpatient case management? No     Do you currently have service(s) that help you manage your care at home? No     Do you take prescription medications? Yes     Do you have prescription coverage? Yes     Coverage [employer]     Do you have any problems affording any of your prescribed medications? No     Is the patient taking medications as prescribed? yes     Who is going to help you get home at discharge? Addis  Jani (wife) [phone] (at bedside)     How do you get to doctors appointments? family or friend will provide;car, drives self     Are you on dialysis? No     Do you take coumadin? No     Discharge Plan A Rehab     Discharge Plan B Skilled Nursing Facility     DME Needed Upon Discharge  other (see comments)   tbd    Discharge Plan discussed with: Patient;Spouse/sig other     Name(s) and Number(s) Addis Gunter (wife) [phone] (at bedside)     Discharge Barriers Identified None        Relationship/Environment    Name(s) of Who Lives With Patient Addis Gunter (wife) [phone] (at bedside)                  Rashida Hernandez, RN, CCRN-K, [facility]  Neuro-Critical Care   [phone]

## 2022-04-29 NOTE — CONSULTS
Inpatient consult to Physical Medicine Rehab  Consult performed by: Ayesha Medina NP  Consult ordered by: Italia Mendez PA-C  Reason for consult: assess rehab needs      Reviewed patient history and current admission.  PM&R following at a distance for medical stability and progress with therapy as patient is in ICU.    KIERA Justice, FNP-C  Physical Medicine & Rehabilitation   04/29/2022

## 2022-04-29 NOTE — HPI
80 yo M with PMH antiphospholipid syndrome on chronic coumadin for left PE diagnosed Jan 2021, DM2, prostate ca s/p prostatectomy, MAC pneumonia dx Fall 2018, mild cognitive impairment, and HLD who was transferred from OSH for compressive epidural hematoma in the cervical spine. Patient reports that he woke up in the middle of the night and could not move the right side of his body. He went to  ED and stroke code was initiated. Patient was not given tPA but given ASA. CTA was negative for acute CVA but did show compressive fluid collective from C3-C6. MRI cervical spine confirmed. Patient was transferred to [facility] ED for neurosurgical evaluation. He reported to NSGY he could not move his right arm out of the contracted position, and he also could not lift his right leg off the bed. He reported his right arm and leg were in pain. No complaints on left side of body. Denied b/b dysfunction. However, when he was taken to the OR and amaya was placed, he put out >1L urine. Pt admitted to [facility] for monitoring and management s/p C3-C6 decompression of epidural hematoma and posterior fusion.

## 2022-04-29 NOTE — ASSESSMENT & PLAN NOTE
-Diagnosed in 2018, follows in clinic with Dr. Rao. 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

## 2022-04-29 NOTE — BRIEF OP NOTE
[facility] - Emergency Dept  Brief Operative Note    SUMMARY     Surgery Date: 4/28/2022     Surgeon(s) and Role:     * Carlos Miller MD - Primary     * Donovan No MD - Resident - Assisting        Pre-op Diagnosis:  Epidural abscess [G06.2]    Post-op Diagnosis:  Post-Op Diagnosis Codes:     * Epidural abscess [G06.2]    Procedure(s) (LRB):  LAMINECTOMY, SPINE, CERVICAL, POSTERIOR APPROACH C3-6 (Bilateral)    Anesthesia: General    Operative Findings: Dorsal epidural hematoma appreciated from C3-C6. Decompression performed and clot evacuated. C3-C6 lateral mass screws placed and rods secured. Hemostasis achieved. Bilateral subfascial drains left in place. Baseline neuromonitoring revealed absent R MEP which persisted through case end. There was intiailly absent SSEP on the right; eventually SSEP were intact bilaterally. Patient tolerated procedure well and was transported to neuro ICU in stable. Condition.     Estimated Blood Loss: 150 mL    Estimated Blood Loss has been documented.         Specimens:   Specimen (24h ago, onward)             Start     Ordered    04/28/22 1914  Specimen to Pathology, Surgery Neurosurgery  Once        Comments: Pre-op Diagnosis: Epidural abscess [G06.2]Procedure(s):LAMINECTOMY, SPINE, CERVICAL, POSTERIOR APPROACH C3-6 Number of specimens: 1Name of specimens: 1. Hematoma - Permanent     References:    Click here for ordering Quick Tip   Question Answer Comment   Procedure Type: Neurosurgery    Which provider would you like to cc? CARLOS MILLER    Release to patient Immediate        04/28/22 1934                [id number]

## 2022-04-29 NOTE — HPI
Mr. Gunter is a 81 year old male with PMH significant for MAC PNA, followed in clinic by Dr. Rao, on ethambutol and azithromycin History also significant for antiphospholipid syndrome, PE on chronic AC and DM2. Was admitted from ED to neuro critical care for CTH showing compressive fluid collection from C3-C6 after presenting to ED with R sided hemiparesis. MRI of cervical spine confirming these findings. NSGY consulted for C3-C6 decompressive surgery. Primary team has patient on azithromycin and cefazolin for empiric treatment. Blood cultures pending. ID consulted for further workup and management of epidural abscess.

## 2022-04-29 NOTE — ASSESSMENT & PLAN NOTE
Kept amaya in post op d/t IVF running for MAP goals and urinary retention noted in OR  -silodosin started

## 2022-04-29 NOTE — PROGRESS NOTES
[facility]  Neurocritical Care  Progress Note    Admit Date: 4/28/2022  Service Date: 04/29/2022  Length of Stay: 1    Subjective:     Chief Complaint: Epidural hematoma    History of Present Illness: 82 yo M with PMH antiphospholipid syndrome on chronic coumadin for left PE diagnosed Jan 2021, DM2, prostate ca s/p prostatectomy, MAC pneumonia dx Fall 2018, mild cognitive impairment, and HLD who was transferred from OSH for compressive epidural hematoma in the cervical spine. Patient reports that he woke up in the middle of the night and could not move the right side of his body. He went to  ED and stroke code was initiated. Patient was not given tPA but given ASA. CTA was negative for acute CVA but did show compressive fluid collective from C3-C6. MRI cervical spine confirmed. Patient was transferred to [facility] ED for neurosurgical evaluation. He reported to NSGY he could not move his right arm out of the contracted position, and he also could not lift his right leg off the bed. He reported his right arm and leg were in pain. No complaints on left side of body. Denied b/b dysfunction. However, when he was taken to the OR and amaya was placed, he put out >1L urine. Pt admitted to [facility] for monitoring and management s/p C3-C6 decompression of epidural hematoma and posterior fusion.      Hospital Course: 04/29/2022C: CT head today per NSGY team, DDAVP once, ID consult, RUE (xray)          Review of Systems    Constitutional: Denies fevers, weight loss, chills, or weakness.  Eyes: Denies changes in vision.  ENT: Denies dysphagia, nasal discharge, ear pain or discharge.  Cardiovascular: Denies chest pain, palpitations, orthopnea, or claudication.  Respiratory: Denies shortness of breath, cough, hemoptysis, or wheezing.  GI: Denies nausea/vomitting, hematochezia, melena, abd pain, or changes in appetite.  : Denies dysuria, incontinence, or hematuria.  Musculoskeletal: Denies joint pain or  myalgias.  Skin/breast: Denies rashes, lumps, lesions, or discharge.  Neurologic: Denies headache, dizziness, vertigo, or paresthesias.  Psychiatric: Denies changes in mood or hallucinations.  Endocrine: Denies polyuria, polydipsia, heat/cold intolerance.  Hematologic/Lymph: Denies lymphadenopathy, easy bruising or easy bleeding.  Allergic/Immunologic: Denies rash, rhinitis.    Objective:     Vitals:  Temp: 98.6 °F (37 °C)  Pulse: 90  Rhythm: normal sinus rhythm  BP: 138/65  MAP (mmHg): 94  Resp: 13  SpO2: 95 %  O2 Device (Oxygen Therapy): nasal cannula    Temp  Min: 94.5 °F (34.7 °C)  Max: 98.6 °F (37 °C)  Pulse  Min: 50  Max: 105  BP  Min: 86/68  Max: 152/85  MAP (mmHg)  Min: 77  Max: 113  Resp  Min: 11  Max: 26  SpO2  Min: 92 %  Max: 100 %    04/28 0701 - 04/29 0700  In: 5246.9 [I.V.:969.3]  Out: 6705 [Urine:6435; Drains:120]   Unmeasured Output  Stool Occurrence: 0       Physical Exam  GA: Alert, comfortable, no acute distress.   HEENT: No scleral icterus or JVD.   Pulmonary: Clear to auscultation A/L.   Cardiac: RRR S1 & S2 w/o rubs/murmurs/gallops.   Abdominal: Bowel sounds present x 4. No appreciable hepatosplenomegaly.  Skin: No jaundice, rashes, or visible lesions.  Neuro:  --GCS: E4 V4 M6  --Mental Status:  opens eyes, delayed responses, tracks, oriented to self only  --CN II-XII grossly intact.   --Pupils 3mm, PERRL.   --Corneal reflex, gag, cough intact.  --LIU spont, BLE unable hold against gravity, RUE weak    Medications:  Continuoussodium chloride 0.45%, Last Rate: 125 mL/hr at 04/29/22 1200    Scheduledacetaminophen, 1,000 mg, Q8H  albuterol-ipratropium, 3 mL, Q6H  azithromycin, 500 mg, Daily  ceFAZolin (ANCEF) IVPB, 1 g, Q8H  dexamethasone, 4 mg, Q6H  ethambutoL, 800 mg, Daily  famotidine (PF), 20 mg, BID  methocarbamoL, 500 mg, Q6H  midazolam, ,   mucus clearing device, , BID  mupirocin, , BID  nebulizer and compressor, 1 Device, BID  polyethylene glycol, 17 g, Daily  pravastatin, 10 mg,  "Daily  pregabalin, 50 mg, TID  senna-docusate 8.6-50 mg, 1 tablet, BID  silodosin, 4 mg, Daily  sodium chloride 3%, 4 mL, BID    PRNacetaminophen, 650 mg, Q6H PRN  calcium gluconate IVPB, 1 g, PRN  calcium gluconate IVPB, 2 g, PRN  calcium gluconate IVPB, 3 g, PRN  dextrose 10%, 12.5 g, PRN  dextrose 10%, 25 g, PRN  fentaNYL, 25 mcg, Q2H PRN  glucagon (human recombinant), 1 mg, PRN  HYDROmorphone, 1 mg, Q4H PRN  insulin aspart U-100, 0-10 Units, Q6H PRN  magnesium sulfate IVPB, 2 g, PRN  magnesium sulfate IVPB, 4 g, PRN  melatonin, 6 mg, Nightly PRN  metoclopramide HCl, 5 mg, Q6H PRN  ondansetron, 4 mg, Q8H PRN  potassium chloride, 40 mEq, PRN   And  potassium chloride, 60 mEq, PRN   And  potassium chloride, 80 mEq, PRN  sodium chloride 0.9%, 10 mL, PRN  sodium chloride 0.9%, 10 mL, PRN      Today I personally reviewed pertinent medications, lines/drains/airways, imaging, cardiology results, laboratory results, microbiology results,    Diet  Diet Dysphagia Pureed (IDDSI Level 4) Nectar Thick        Assessment/Plan:     Neuro  * Epidural hematoma  s/p C3-C6 decompression of epidural hematoma and posterior fusion    -NSGY following  -dex 4 q6h + H2B  -MAP goal >85, maintaining on NS  - q1h neuro and VS checks  -PT/OT/SLP as appropriate  -c collar in place  -drains per NSGY  -post op XR pending  -hold AC/AP in acute post op setting  -cefazolin ppx  4/29/2022: pending CT head per NSGY team   Continue MAP >85    Intermittent confusion  Per patient's wife and grandson bedside, pt has had periods of intermittent confusion and mumbling since being dx with MAC in 2018. They state every time he comes to the hospital, he appears much more confused and mumbles.     -Pt disoriented post op, repeating words told to him, mumbling, or saying "yes/no"; per family bedside, this is not unexpected behavior for him based on previous admissions.  -cont to monitor neuro checks q1h for improvement in mental state  -pending CT " head    Cognitive communication deficit  Noted in hx    Mild cognitive impairment  Noted in hx    Pulmonary  Cavitary lung disease  See MAC, cont ABx    Cardiac/Vascular  Hyperlipidemia associated with type 2 diabetes mellitus  Cont statin    ID  HILLARY (mycobacterium avium-intracellulare)  Dx Fall 2018\  Cont azithromycin and ethambutol  ID consulted    Hematology  History of pulmonary embolism  Dx Jan 2021  Hold warfarin    Oncology  History of prostate cancer  S/p prostatectomy 2011    Endocrine  Prediabetes  A1C 5.7  SSI and accu checks          The patient is being Prophylaxed for:  Venous Thromboembolism with: Mechanical  Stress Ulcer with: H2B  Ventilator Pneumonia with: not applicable    Activity Orders          Diet Dysphagia Pureed (IDDSI Level 4) Nectar Thick: Dysphagia 1 (Pureed) starting at 04/29 1027    Turn patient starting at 04/28 2200    Bed rest starting at 04/28 2049    Elevate HOB starting at 04/28 2049    Progressive Mobility Protocol (mobilize patient to their highest level of functioning at least twice daily) starting at 04/28 0800    Elevate HOB Elevate (< 30 degrees) starting at 04/28 0630    Elevate HOB Elevate (30-45 degrees) starting at 04/28 0629    Straight Cath starting at 04/28 0629        Full Code    Elida Lamb NP  Neurocritical Care  [facility]

## 2022-04-29 NOTE — ANESTHESIA POSTPROCEDURE EVALUATION
Anesthesia Post Evaluation    Patient: Diego Gunter    Procedure(s) Performed: Procedure(s) (LRB):  LAMINECTOMY, SPINE, CERVICAL, POSTERIOR APPROACH C3-6 (Bilateral)    Final Anesthesia Type: general      Patient location during evaluation: ICU  Patient participation: Yes- Able to Participate  Level of consciousness: awake and alert and oriented  Post-procedure vital signs: reviewed and stable  Pain management: adequate  Airway patency: patent    PONV status at discharge: No PONV  Anesthetic complications: no      Cardiovascular status: blood pressure returned to baseline  Respiratory status: unassisted, spontaneous ventilation and face mask  Hydration status: euvolemic  Follow-up not needed.          Vitals Value Taken Time   /67 04/28/22 2116   Temp 37 04/28/22 2120   Pulse 76 04/28/22 2119   Resp 15 04/28/22 2119   SpO2 100 % 04/28/22 2119   Vitals shown include unvalidated device data.      No case tracking events are documented in the log.      Pain/Shena Score: Pain Rating Prior to Med Admin: 10 (4/28/2022  7:54 AM)

## 2022-04-29 NOTE — SUBJECTIVE & OBJECTIVE
Review of Systems    Constitutional: Denies fevers, weight loss, chills, or weakness.  Eyes: Denies changes in vision.  ENT: Denies dysphagia, nasal discharge, ear pain or discharge.  Cardiovascular: Denies chest pain, palpitations, orthopnea, or claudication.  Respiratory: Denies shortness of breath, cough, hemoptysis, or wheezing.  GI: Denies nausea/vomitting, hematochezia, melena, abd pain, or changes in appetite.  : Denies dysuria, incontinence, or hematuria.  Musculoskeletal: Denies joint pain or myalgias.  Skin/breast: Denies rashes, lumps, lesions, or discharge.  Neurologic: Denies headache, dizziness, vertigo, or paresthesias.  Psychiatric: Denies changes in mood or hallucinations.  Endocrine: Denies polyuria, polydipsia, heat/cold intolerance.  Hematologic/Lymph: Denies lymphadenopathy, easy bruising or easy bleeding.  Allergic/Immunologic: Denies rash, rhinitis.    Objective:     Vitals:  Temp: 98.6 °F (37 °C)  Pulse: 90  Rhythm: normal sinus rhythm  BP: 138/65  MAP (mmHg): 94  Resp: 13  SpO2: 95 %  O2 Device (Oxygen Therapy): nasal cannula    Temp  Min: 94.5 °F (34.7 °C)  Max: 98.6 °F (37 °C)  Pulse  Min: 50  Max: 105  BP  Min: 86/68  Max: 152/85  MAP (mmHg)  Min: 77  Max: 113  Resp  Min: 11  Max: 26  SpO2  Min: 92 %  Max: 100 %    04/28 0701 - 04/29 0700  In: 5246.9 [I.V.:969.3]  Out: 6705 [Urine:6435; Drains:120]   Unmeasured Output  Stool Occurrence: 0       Physical Exam  GA: Alert, comfortable, no acute distress.   HEENT: No scleral icterus or JVD.   Pulmonary: Clear to auscultation A/L.   Cardiac: RRR S1 & S2 w/o rubs/murmurs/gallops.   Abdominal: Bowel sounds present x 4. No appreciable hepatosplenomegaly.  Skin: No jaundice, rashes, or visible lesions.  Neuro:  --GCS: E4 V4 M6  --Mental Status:  opens eyes, delayed responses, tracks, oriented to self only  --CN II-XII grossly intact.   --Pupils 3mm, PERRL.   --Corneal reflex, gag, cough intact.  --LIU spont, BLE unable hold against gravity,  RUE weak    Medications:  Continuoussodium chloride 0.45%, Last Rate: 125 mL/hr at 04/29/22 1200    Scheduledacetaminophen, 1,000 mg, Q8H  albuterol-ipratropium, 3 mL, Q6H  azithromycin, 500 mg, Daily  ceFAZolin (ANCEF) IVPB, 1 g, Q8H  dexamethasone, 4 mg, Q6H  ethambutoL, 800 mg, Daily  famotidine (PF), 20 mg, BID  methocarbamoL, 500 mg, Q6H  midazolam, ,   mucus clearing device, , BID  mupirocin, , BID  nebulizer and compressor, 1 Device, BID  polyethylene glycol, 17 g, Daily  pravastatin, 10 mg, Daily  pregabalin, 50 mg, TID  senna-docusate 8.6-50 mg, 1 tablet, BID  silodosin, 4 mg, Daily  sodium chloride 3%, 4 mL, BID    PRNacetaminophen, 650 mg, Q6H PRN  calcium gluconate IVPB, 1 g, PRN  calcium gluconate IVPB, 2 g, PRN  calcium gluconate IVPB, 3 g, PRN  dextrose 10%, 12.5 g, PRN  dextrose 10%, 25 g, PRN  fentaNYL, 25 mcg, Q2H PRN  glucagon (human recombinant), 1 mg, PRN  HYDROmorphone, 1 mg, Q4H PRN  insulin aspart U-100, 0-10 Units, Q6H PRN  magnesium sulfate IVPB, 2 g, PRN  magnesium sulfate IVPB, 4 g, PRN  melatonin, 6 mg, Nightly PRN  metoclopramide HCl, 5 mg, Q6H PRN  ondansetron, 4 mg, Q8H PRN  potassium chloride, 40 mEq, PRN   And  potassium chloride, 60 mEq, PRN   And  potassium chloride, 80 mEq, PRN  sodium chloride 0.9%, 10 mL, PRN  sodium chloride 0.9%, 10 mL, PRN      Today I personally reviewed pertinent medications, lines/drains/airways, imaging, cardiology results, laboratory results, microbiology results,    Diet  Diet Dysphagia Pureed (IDDSI Level 4) Nectar Thick

## 2022-04-29 NOTE — ASSESSMENT & PLAN NOTE
80 y/o male with APS on coumadin, hx PE, MAC pneumonia, HLD with spontaneous C3-C6 epidural hematoma compressing the spinal cord    S/p  C3-C6 laminectomy and fusion for evacuation of epidural hematoma     Na management as medical team   CT head STAT ordered and pending   MAPS  > 80   Contiue steroids   Maintain Drains     Discussed with Dr. Miller

## 2022-04-29 NOTE — H&P
[facility]  Neurocritical Care  History & Physical    Admit Date: 4/28/2022  Service Date: 04/29/2022  Length of Stay: 1    Subjective:     Chief Complaint: Epidural hematoma    History of Present Illness: 80 yo M with PMH antiphospholipid syndrome on chronic coumadin for left PE diagnosed Jan 2021, DM2, prostate ca s/p prostatectomy, MAC pneumonia dx Fall 2018, mild cognitive impairment, and HLD who was transferred from OSH for compressive epidural hematoma in the cervical spine. Patient reports that he woke up in the middle of the night and could not move the right side of his body. He went to  ED and stroke code was initiated. Patient was not given tPA but given ASA. CTA was negative for acute CVA but did show compressive fluid collective from C3-C6. MRI cervical spine confirmed. Patient was transferred to [facility] for neurosurgical evaluation. He reported to NSGY he could not move his right arm out of the contracted position, and he also could not lift his right leg off the bed. He reported his right arm and leg were in pain. No complaints on left side of body. Denied b/b dysfunction. However, when he was taken to the OR and amaya was placed, he put out >1L urine. Pt admitted to [facility] for monitoring and management s/p C3-C6 decompression of epidural hematoma and posterior fusion.      Past Medical History:   Diagnosis Date    Antiphospholipid syndrome 11/19/2021    Dysphagia     Hx of colonic polyps     followed by GI. Dr. Pham    Hyperlipidemia LDL goal <100     Overweight(278.02)     Prostate cancer september 2011    followed by urology, Dr. Rogers    Type II or unspecified type diabetes mellitus without mention of complication, not stated as uncontrolled     diet controlled     Past Surgical History:   Procedure Laterality Date    BRONCHOSCOPY N/A 10/15/2018    Procedure: BRONCHOSCOPY;  Surgeon: Pratibha Diagnostic Provider;  Location: [facility] OR [address] [place] [address];  Service: Anesthesiology;   Laterality: N/A;    CATARACT EXTRACTION, BILATERAL  2014    PROSTATECTOMY        No current facility-administered medications on file prior to encounter.     Current Outpatient Medications on File Prior to Encounter   Medication Sig Dispense Refill    albuterol (PROVENTIL/VENTOLIN HFA) 90 mcg/actuation inhaler INHALE 2 PUFFS BY MOUTH EVERY 6 HOURS AS NEEDED FOR WHEEZING 9 g 0    ascorbic acid, vitamin C, (VITAMIN C) 1000 MG tablet Take 1,000 mg by mouth once daily.      aspirin (ECOTRIN) 81 MG EC tablet Take 81 mg by mouth.      b complex vitamins capsule Take 1 capsule by mouth once daily.      ethambutoL (MYAMBUTOL) 400 MG Tab Take 2 tablets (800 mg total) by mouth once daily. 60 tablet 5    mucus clearing device (AEROBIKA OSCILLATING PEP SYSTM) by Misc.(Non-Drug; Combo Route) route 2 (two) times a day. 1 Device 0    nebulizer and compressor ([employer] COMPRESSOR SYSTEM) Marcy use to administer nebulized medication as directed 1 each 0    omega 3-dha-epa-fish oil 1,000 (120-180) mg Cap Take 1 capsule by mouth once daily.      omega-3 fatty acids 1,000 mg Cap Take 2 g by mouth.      omeprazole 20 mg TbEC 1/2 tablet daily in am as needed. 45 each 0    pravastatin (PRAVACHOL) 10 MG tablet Take 1 tablet (10 mg total) by mouth once daily. 90 tablet 0    promethazine-dextromethorphan (PROMETHAZINE-DM) 6.25-15 mg/5 mL Syrp Take 10-15ml by mouth every 8 hours as needed for coughing 240 mL 0    sodium chloride 3% 3 % nebulizer solution USE 4 ML VIAL IN NEBULIZER  TWICE DAILY 480 mL 11    warfarin (COUMADIN) 3 MG tablet Take 1.5 tablets (4.5 mg total) by mouth Daily. As directed by coumadin clinic 45 tablet 11      Allergies: Patient has no known allergies.    Family History   Problem Relation Age of Onset    Colon cancer Mother     Diabetes Mother     Heart disease Father     Mental illness Sister     Diabetes Sister     Other Brother         polio as a child    Pulmonary fibrosis Brother     Diabetes  Unknown     Diabetes Brother     Myasthenia gravis Brother     Parkinsonism Brother     Diabetes Brother     Dementia Brother     Lung cancer Brother         smoker    Diabetes Maternal Aunt      Social History     Tobacco Use    Smoking status: Former Smoker     Packs/day: 0.25     Years: 5.00     Pack years: 1.25     Quit date: 10/2/1962     Years since quittin.6    Smokeless tobacco: Never Used    Tobacco comment: quit age 23   Substance Use Topics    Alcohol use: Yes     Comment: beer occasionally    Drug use: No     Review of Systems   Unable to perform ROS: Acuity of condition (groggy and confused post op, expected per family bedside)   Constitutional:  Negative for chills and fever.   HENT:  Positive for trouble swallowing. Negative for rhinorrhea.    Eyes:  Negative for photophobia.   Respiratory:  Negative for shortness of breath and wheezing.    Cardiovascular:  Negative for chest pain and palpitations.   Gastrointestinal:  Negative for abdominal pain, nausea and vomiting.   Genitourinary:  Negative for difficulty urinating (ordinarily no difficulty).   Musculoskeletal:  Positive for myalgias and neck pain.   Skin:  Negative for rash.   Neurological:  Positive for speech difficulty and weakness. Negative for numbness and headaches.   Hematological:  Bruises/bleeds easily.        APA   Psychiatric/Behavioral:  Positive for confusion. Negative for agitation.    Objective:     Vitals:    Temp: 97.4 °F (36.3 °C)  Pulse: 77  Rhythm: normal sinus rhythm  BP: (!) 109/56  MAP (mmHg): 77  Resp: 19  SpO2: 95 %  O2 Device (Oxygen Therapy): nasal cannula    Temp  Min: 94.5 °F (34.7 °C)  Max: 98.7 °F (37.1 °C)  Pulse  Min: 45  Max: 97  BP  Min: 86/68  Max: 167/80  MAP (mmHg)  Min: 77  Max: 116  Resp  Min: 8  Max: 25  SpO2  Min: 92 %  Max: 100 %     0701 -  0700  In: 3127.1 [I.V.:372.2]  Out: 3000 [Urine:2850]           Physical Exam  Constitutional:       General: He is not in acute  "distress.  HENT:      Head: Normocephalic and atraumatic.      Right Ear: External ear normal.      Left Ear: External ear normal.      Nose: Nose normal. No rhinorrhea.      Mouth/Throat:      Mouth: Mucous membranes are moist.      Pharynx: Oropharynx is clear.   Eyes:      General:         Right eye: No discharge.         Left eye: No discharge.      Conjunctiva/sclera: Conjunctivae normal.   Neck:      Comments: C collar in place  Cardiovascular:      Rate and Rhythm: Normal rate and regular rhythm.      Pulses: Normal pulses.   Pulmonary:      Effort: Pulmonary effort is normal. No respiratory distress.      Breath sounds: No stridor. No wheezing.   Abdominal:      General: Abdomen is flat. There is no distension.      Palpations: Abdomen is soft. There is no mass.      Tenderness: There is no abdominal tenderness (said yes but could not say where, was also repeating "yes", will re evaluate). There is no guarding.   Musculoskeletal:         General: No swelling, deformity or signs of injury.      Cervical back: Neck supple.   Skin:     General: Skin is warm and dry.      Findings: No rash.   Neurological:      Mental Status: He is alert.      Comments: Sedation: none but groggy post op (upon reassessment later in evening moving all 4 extremities more, still disoriented)  GCS E4V3M6  AAO to self, age; not year, location, situation  Attempts to follow commands on UE for thumbs up and opens eyes wide  Pupils 5mm and reactive, L more brisk than R, round bilat  EOMs limited due to c collar in place and pt not following all commands; able to look leftward, downward, upward, but R eye appears limited looking rightward (left eye crosses midline)  Pt able to name how many fingers I was holding in front of him, blinks to threat on L, did not blink on R  + spont cough and swallow, corneals deferred on awake pt  RUE somewhat contracted but able to passively extend 2/5, LUE normal tone mild spasticity noted 3/5  RLE normal " "tone 1/5, LLE 3/5 normal tone  SILT x 4       Psychiatric:         Mood and Affect: Mood normal.     Unable to test orientation, language, memory, judgment, insight, fund of knowledge, hearing, shoulder shrug, tongue protrusion, coordination, gait due to level of consciousness.    Today I personally reviewed pertinent medications, lines/drains/airways, imaging, cardiology results, laboratory results, microbiology results, notably:    CTA      Assessment/Plan:     Neuro  * Epidural hematoma  s/p C3-C6 decompression of epidural hematoma and posterior fusion    -NSGY following  -dex 4 q6h + H2B  -MAP goal >80, maintaining on NS  - q1h neuro and VS checks  -PT/OT/SLP as appropriate  -c collar in place  -drains per NSGY  -post op XR pending  -hold AC/AP in acute post op setting  -cefazolin ppx    Intermittent confusion  Per patient's wife and grandson bedside, pt has had periods of intermittent confusion and mumbling since being dx with MAC in 2018. They state every time he comes to the hospital, he appears much more confused and mumbles.     -Pt disoriented post op, repeating words told to him, mumbling, or saying "yes/no"; per family bedside, this is not unexpected behavior for him based on previous admissions.  -cont to monitor neuro checks q1h for improvement in mental state    Acute ischemic left MCA stroke  Neg on CTA  Given ASA at OSH before epidural hematoma found as cause of acute hemiplegia    Cognitive communication deficit  Noted in hx    Mild cognitive impairment  Noted in hx    Pulmonary  Cavitary lung disease  See MAC, cont ABx    Cardiac/Vascular  Hyperlipidemia associated with type 2 diabetes mellitus  Cont statin    Renal/  Urinary retention  Kept amaya in post op d/t IVF running for MAP goals and urinary retention noted in OR  -silodosin started    ID  HILLARY (mycobacterium avium-intracellulare)  Dx Fall 2018\  Cont azithromycin and ethambutol    Hematology  Antiphospholipid syndrome  Hold AC/AP for " now  Follow CBC and coags closely  Hematology consult, appreciate reccs    Hypercoagulable state  See APA    History of pulmonary embolism  Dx Jan 2021  Hold warfarin    Oncology  History of prostate cancer  S/p prostatectomy 2011    Endocrine  Prediabetes  A1C 5.7  SSI and accu checks          The patient is being Prophylaxed for:  Venous Thromboembolism with: Mechanical  Stress Ulcer with: H2B  Ventilator Pneumonia with: not applicable    Activity Orders          Turn patient starting at 04/28 2200    Bed rest starting at 04/28 2049    Elevate HOB starting at 04/28 2049    Diet NPO: NPO starting at 04/28 2049    Progressive Mobility Protocol (mobilize patient to their highest level of functioning at least twice daily) starting at 04/28 0800    Elevate HOB Elevate (< 30 degrees) starting at 04/28 0630    Elevate HOB Elevate (30-45 degrees) starting at 04/28 0629    Straight Cath starting at 04/28 0629        Full Code     Critical condition in that Patient has a condition that poses threat to life and bodily function: s/p C3-C6 epidural hematoma decompression and posterior fusion at risk of airway compromise and bleeding, MAC PNA, antiphospholipid syndrome, chronic PE on coumadin     40 minutes of Critical care time was spent personally by me on the following activities: development of treatment plan with patient or surrogate and bedside caregivers, discussions with consultants, evaluation of patient's response to treatment, examination of patient, ordering and performing treatments and interventions, ordering and review of laboratory studies, ordering and review of radiographic studies, pulse oximetry, antibiotic titration if applicable, vasopressor titration if applicable, re-evaluation of patient's condition. This critical care time did not overlap with that of any other provider or involve time for any procedures. There is high probability for acute neurological change leading to clinical and possibly  life-threatening deterioration requiring highest level of physician preparedness for urgent intervention.    Italia Mendez PA-C  Neurocritical Care  [facility]

## 2022-04-30 LAB
ALBUMIN SERPL BCP-MCNC: 3.4 G/DL (ref 3.5–5.2)
ALLENS TEST: ABNORMAL
ALP SERPL-CCNC: 56 U/L (ref 55–135)
ALT SERPL W/O P-5'-P-CCNC: 20 U/L (ref 10–44)
ANION GAP SERPL CALC-SCNC: 10 MMOL/L (ref 8–16)
APTT BLDCRRT: 41.3 SEC (ref 21–32)
AST SERPL-CCNC: 31 U/L (ref 10–40)
BACTERIA #/AREA URNS AUTO: ABNORMAL /HPF
BASOPHILS # BLD AUTO: 0.02 K/UL (ref 0–0.2)
BASOPHILS # BLD AUTO: 0.02 K/UL (ref 0–0.2)
BASOPHILS NFR BLD: 0.1 % (ref 0–1.9)
BASOPHILS NFR BLD: 0.1 % (ref 0–1.9)
BILIRUB SERPL-MCNC: 0.4 MG/DL (ref 0.1–1)
BILIRUB UR QL STRIP: NEGATIVE
BUN SERPL-MCNC: 7 MG/DL (ref 8–23)
CALCIUM SERPL-MCNC: 8.1 MG/DL (ref 8.7–10.5)
CHLORIDE SERPL-SCNC: 114 MMOL/L (ref 95–110)
CK MB SERPL-MCNC: 9.6 NG/ML (ref 0.1–6.5)
CK MB SERPL-RTO: 1.9 % (ref 0–5)
CK SERPL-CCNC: 500 U/L (ref 20–200)
CK SERPL-CCNC: 500 U/L (ref 20–200)
CLARITY UR REFRACT.AUTO: CLEAR
CO2 SERPL-SCNC: 24 MMOL/L (ref 23–29)
COLOR UR AUTO: ABNORMAL
CREAT SERPL-MCNC: 0.7 MG/DL (ref 0.5–1.4)
DELSYS: ABNORMAL
DIFFERENTIAL METHOD: ABNORMAL
DIFFERENTIAL METHOD: ABNORMAL
EOSINOPHIL # BLD AUTO: 0 K/UL (ref 0–0.5)
EOSINOPHIL # BLD AUTO: 0 K/UL (ref 0–0.5)
EOSINOPHIL NFR BLD: 0 % (ref 0–8)
EOSINOPHIL NFR BLD: 0 % (ref 0–8)
ERYTHROCYTE [DISTWIDTH] IN BLOOD BY AUTOMATED COUNT: 15.2 % (ref 11.5–14.5)
ERYTHROCYTE [DISTWIDTH] IN BLOOD BY AUTOMATED COUNT: 15.3 % (ref 11.5–14.5)
EST. GFR  (AFRICAN AMERICAN): >60 ML/MIN/1.73 M^2
EST. GFR  (NON AFRICAN AMERICAN): >60 ML/MIN/1.73 M^2
GLUCOSE SERPL-MCNC: 141 MG/DL (ref 70–110)
GLUCOSE UR QL STRIP: NEGATIVE
HCO3 UR-SCNC: 27.6 MMOL/L (ref 24–28)
HCT VFR BLD AUTO: 31 % (ref 40–54)
HCT VFR BLD AUTO: 34.6 % (ref 40–54)
HGB BLD-MCNC: 10.8 G/DL (ref 14–18)
HGB BLD-MCNC: 9.9 G/DL (ref 14–18)
HGB UR QL STRIP: ABNORMAL
HYALINE CASTS UR QL AUTO: 1 /LPF
IMM GRANULOCYTES # BLD AUTO: 0.14 K/UL (ref 0–0.04)
IMM GRANULOCYTES # BLD AUTO: 0.25 K/UL (ref 0–0.04)
IMM GRANULOCYTES NFR BLD AUTO: 0.6 % (ref 0–0.5)
IMM GRANULOCYTES NFR BLD AUTO: 1.1 % (ref 0–0.5)
INR PPP: 1.7 (ref 0.8–1.2)
KETONES UR QL STRIP: NEGATIVE
LEUKOCYTE ESTERASE UR QL STRIP: NEGATIVE
LYMPHOCYTES # BLD AUTO: 0.9 K/UL (ref 1–4.8)
LYMPHOCYTES # BLD AUTO: 1.1 K/UL (ref 1–4.8)
LYMPHOCYTES NFR BLD: 4 % (ref 18–48)
LYMPHOCYTES NFR BLD: 5 % (ref 18–48)
MAGNESIUM SERPL-MCNC: 2 MG/DL (ref 1.6–2.6)
MCH RBC QN AUTO: 26.9 PG (ref 27–31)
MCH RBC QN AUTO: 27.9 PG (ref 27–31)
MCHC RBC AUTO-ENTMCNC: 31.2 G/DL (ref 32–36)
MCHC RBC AUTO-ENTMCNC: 31.9 G/DL (ref 32–36)
MCV RBC AUTO: 86 FL (ref 82–98)
MCV RBC AUTO: 87 FL (ref 82–98)
MICROSCOPIC COMMENT: ABNORMAL
MONOCYTES # BLD AUTO: 1.4 K/UL (ref 0.3–1)
MONOCYTES # BLD AUTO: 1.9 K/UL (ref 0.3–1)
MONOCYTES NFR BLD: 6.3 % (ref 4–15)
MONOCYTES NFR BLD: 8.7 % (ref 4–15)
NEUTROPHILS # BLD AUTO: 19 K/UL (ref 1.8–7.7)
NEUTROPHILS # BLD AUTO: 19.4 K/UL (ref 1.8–7.7)
NEUTROPHILS NFR BLD: 85.6 % (ref 38–73)
NEUTROPHILS NFR BLD: 88.5 % (ref 38–73)
NITRITE UR QL STRIP: NEGATIVE
NRBC BLD-RTO: 0 /100 WBC
NRBC BLD-RTO: 0 /100 WBC
PCO2 BLDA: 37.4 MMHG (ref 35–45)
PH SMN: 7.48 [PH] (ref 7.35–7.45)
PH UR STRIP: 7 [PH] (ref 5–8)
PHOSPHATE SERPL-MCNC: 2.3 MG/DL (ref 2.7–4.5)
PLATELET # BLD AUTO: 234 K/UL (ref 150–450)
PLATELET # BLD AUTO: 240 K/UL (ref 150–450)
PMV BLD AUTO: 10 FL (ref 9.2–12.9)
PMV BLD AUTO: 11.2 FL (ref 9.2–12.9)
PO2 BLDA: 73 MMHG (ref 80–100)
POC BE: 4 MMOL/L
POC SATURATED O2: 96 % (ref 95–100)
POC TCO2: 29 MMOL/L (ref 23–27)
POCT GLUCOSE: 139 MG/DL (ref 70–110)
POCT GLUCOSE: 190 MG/DL (ref 70–110)
POTASSIUM SERPL-SCNC: 4 MMOL/L (ref 3.5–5.1)
PROT SERPL-MCNC: 6.1 G/DL (ref 6–8.4)
PROT UR QL STRIP: NEGATIVE
PROTHROMBIN TIME: 17.2 SEC (ref 9–12.5)
RBC # BLD AUTO: 3.55 M/UL (ref 4.6–6.2)
RBC # BLD AUTO: 4.02 M/UL (ref 4.6–6.2)
RBC #/AREA URNS AUTO: 77 /HPF (ref 0–4)
SAMPLE: ABNORMAL
SITE: ABNORMAL
SODIUM SERPL-SCNC: 142 MMOL/L (ref 136–145)
SODIUM SERPL-SCNC: 143 MMOL/L (ref 136–145)
SODIUM SERPL-SCNC: 144 MMOL/L (ref 136–145)
SODIUM SERPL-SCNC: 147 MMOL/L (ref 136–145)
SODIUM SERPL-SCNC: 147 MMOL/L (ref 136–145)
SODIUM SERPL-SCNC: 148 MMOL/L (ref 136–145)
SP GR UR STRIP: 1.01 (ref 1–1.03)
URN SPEC COLLECT METH UR: ABNORMAL
WBC # BLD AUTO: 21.89 K/UL (ref 3.9–12.7)
WBC # BLD AUTO: 22.15 K/UL (ref 3.9–12.7)
WBC #/AREA URNS AUTO: 2 /HPF (ref 0–5)

## 2022-04-30 PROCEDURE — 84100 ASSAY OF PHOSPHORUS: CPT | Performed by: PHYSICIAN ASSISTANT

## 2022-04-30 PROCEDURE — 85610 PROTHROMBIN TIME: CPT | Performed by: PHYSICIAN ASSISTANT

## 2022-04-30 PROCEDURE — 93005 ELECTROCARDIOGRAM TRACING: CPT

## 2022-04-30 PROCEDURE — 25500020 PHARM REV CODE 255: Performed by: INTERNAL MEDICINE

## 2022-04-30 PROCEDURE — 85732 THROMBOPLASTIN TIME PARTIAL: CPT | Performed by: STUDENT IN AN ORGANIZED HEALTH CARE EDUCATION/TRAINING PROGRAM

## 2022-04-30 PROCEDURE — 97163 PT EVAL HIGH COMPLEX 45 MIN: CPT

## 2022-04-30 PROCEDURE — 25000003 PHARM REV CODE 250

## 2022-04-30 PROCEDURE — 37799 UNLISTED PX VASCULAR SURGERY: CPT

## 2022-04-30 PROCEDURE — P9045 ALBUMIN (HUMAN), 5%, 250 ML: HCPCS | Mod: JG | Performed by: PHYSICIAN ASSISTANT

## 2022-04-30 PROCEDURE — 63600175 PHARM REV CODE 636 W HCPCS: Mod: JG | Performed by: PHYSICIAN ASSISTANT

## 2022-04-30 PROCEDURE — 63600175 PHARM REV CODE 636 W HCPCS: Performed by: STUDENT IN AN ORGANIZED HEALTH CARE EDUCATION/TRAINING PROGRAM

## 2022-04-30 PROCEDURE — 82803 BLOOD GASES ANY COMBINATION: CPT

## 2022-04-30 PROCEDURE — 93010 ELECTROCARDIOGRAM REPORT: CPT | Mod: ,,, | Performed by: INTERNAL MEDICINE

## 2022-04-30 PROCEDURE — 97112 NEUROMUSCULAR REEDUCATION: CPT

## 2022-04-30 PROCEDURE — 99291 CRITICAL CARE FIRST HOUR: CPT | Mod: 25,GC,ICN, | Performed by: INTERNAL MEDICINE

## 2022-04-30 PROCEDURE — 25000003 PHARM REV CODE 250: Performed by: NURSE PRACTITIONER

## 2022-04-30 PROCEDURE — 82553 CREATINE MB FRACTION: CPT | Performed by: PHYSICIAN ASSISTANT

## 2022-04-30 PROCEDURE — 25000242 PHARM REV CODE 250 ALT 637 W/ HCPCS: Performed by: NURSE PRACTITIONER

## 2022-04-30 PROCEDURE — 86146 BETA-2 GLYCOPROTEIN ANTIBODY: CPT | Performed by: STUDENT IN AN ORGANIZED HEALTH CARE EDUCATION/TRAINING PROGRAM

## 2022-04-30 PROCEDURE — 25000003 PHARM REV CODE 250: Performed by: STUDENT IN AN ORGANIZED HEALTH CARE EDUCATION/TRAINING PROGRAM

## 2022-04-30 PROCEDURE — 85025 COMPLETE CBC W/AUTO DIFF WBC: CPT | Performed by: PHYSICIAN ASSISTANT

## 2022-04-30 PROCEDURE — 93010 EKG 12-LEAD: ICD-10-PCS | Mod: ,,, | Performed by: INTERNAL MEDICINE

## 2022-04-30 PROCEDURE — 25000003 PHARM REV CODE 250: Performed by: PHYSICIAN ASSISTANT

## 2022-04-30 PROCEDURE — 84295 ASSAY OF SERUM SODIUM: CPT | Mod: 91 | Performed by: PHYSICIAN ASSISTANT

## 2022-04-30 PROCEDURE — 85730 THROMBOPLASTIN TIME PARTIAL: CPT | Mod: 91 | Performed by: STUDENT IN AN ORGANIZED HEALTH CARE EDUCATION/TRAINING PROGRAM

## 2022-04-30 PROCEDURE — 99292 CRITICAL CARE ADDL 30 MIN: CPT | Mod: ,,, | Performed by: PHYSICIAN ASSISTANT

## 2022-04-30 PROCEDURE — 99291 PR CRITICAL CARE, E/M 30-74 MINUTES: ICD-10-PCS | Mod: 25,GC,ICN, | Performed by: INTERNAL MEDICINE

## 2022-04-30 PROCEDURE — 63600175 PHARM REV CODE 636 W HCPCS: Performed by: PHYSICIAN ASSISTANT

## 2022-04-30 PROCEDURE — 80053 COMPREHEN METABOLIC PANEL: CPT | Performed by: PHYSICIAN ASSISTANT

## 2022-04-30 PROCEDURE — 94640 AIRWAY INHALATION TREATMENT: CPT

## 2022-04-30 PROCEDURE — 97167 OT EVAL HIGH COMPLEX 60 MIN: CPT

## 2022-04-30 PROCEDURE — 25000003 PHARM REV CODE 250: Performed by: HOSPITALIST

## 2022-04-30 PROCEDURE — 85025 COMPLETE CBC W/AUTO DIFF WBC: CPT | Mod: 91

## 2022-04-30 PROCEDURE — 51702 INSERT TEMP BLADDER CATH: CPT

## 2022-04-30 PROCEDURE — 85670 THROMBIN TIME PLASMA: CPT | Performed by: STUDENT IN AN ORGANIZED HEALTH CARE EDUCATION/TRAINING PROGRAM

## 2022-04-30 PROCEDURE — 84295 ASSAY OF SERUM SODIUM: CPT | Performed by: PHYSICIAN ASSISTANT

## 2022-04-30 PROCEDURE — 20000000 HC ICU ROOM

## 2022-04-30 PROCEDURE — 83735 ASSAY OF MAGNESIUM: CPT | Performed by: PHYSICIAN ASSISTANT

## 2022-04-30 PROCEDURE — 94761 N-INVAS EAR/PLS OXIMETRY MLT: CPT

## 2022-04-30 PROCEDURE — 25000242 PHARM REV CODE 250 ALT 637 W/ HCPCS: Performed by: PHYSICIAN ASSISTANT

## 2022-04-30 PROCEDURE — 99900035 HC TECH TIME PER 15 MIN (STAT)

## 2022-04-30 PROCEDURE — 97530 THERAPEUTIC ACTIVITIES: CPT

## 2022-04-30 PROCEDURE — 25000003 PHARM REV CODE 250: Performed by: PSYCHIATRY & NEUROLOGY

## 2022-04-30 PROCEDURE — 27000221 HC OXYGEN, UP TO 24 HOURS

## 2022-04-30 PROCEDURE — 63700000 PHARM REV CODE 250 ALT 637 W/O HCPCS: Performed by: NURSE PRACTITIONER

## 2022-04-30 PROCEDURE — 63600175 PHARM REV CODE 636 W HCPCS: Performed by: PSYCHIATRY & NEUROLOGY

## 2022-04-30 PROCEDURE — 85730 THROMBOPLASTIN TIME PARTIAL: CPT | Performed by: PHYSICIAN ASSISTANT

## 2022-04-30 PROCEDURE — 86147 CARDIOLIPIN ANTIBODY EA IG: CPT | Mod: 59 | Performed by: STUDENT IN AN ORGANIZED HEALTH CARE EDUCATION/TRAINING PROGRAM

## 2022-04-30 PROCEDURE — 81001 URINALYSIS AUTO W/SCOPE: CPT | Performed by: PHYSICIAN ASSISTANT

## 2022-04-30 PROCEDURE — A9585 GADOBUTROL INJECTION: HCPCS | Performed by: INTERNAL MEDICINE

## 2022-04-30 PROCEDURE — 99292 PR CRITICAL CARE, ADDL 30 MIN: ICD-10-PCS | Mod: ,,, | Performed by: PHYSICIAN ASSISTANT

## 2022-04-30 PROCEDURE — 63600175 PHARM REV CODE 636 W HCPCS

## 2022-04-30 RX ORDER — NOREPINEPHRINE BITARTRATE/D5W 4MG/250ML
0-3 PLASTIC BAG, INJECTION (ML) INTRAVENOUS CONTINUOUS
Status: DISCONTINUED | OUTPATIENT
Start: 2022-04-30 | End: 2022-05-01

## 2022-04-30 RX ORDER — NOREPINEPHRINE BITARTRATE/D5W 4MG/250ML
0-3 PLASTIC BAG, INJECTION (ML) INTRAVENOUS CONTINUOUS
Status: DISCONTINUED | OUTPATIENT
Start: 2022-04-30 | End: 2022-04-30

## 2022-04-30 RX ORDER — ALBUMIN HUMAN 50 G/1000ML
25 SOLUTION INTRAVENOUS ONCE
Status: COMPLETED | OUTPATIENT
Start: 2022-04-30 | End: 2022-04-30

## 2022-04-30 RX ORDER — NOREPINEPHRINE BITARTRATE/D5W 4MG/250ML
PLASTIC BAG, INJECTION (ML) INTRAVENOUS
Status: COMPLETED
Start: 2022-04-30 | End: 2022-04-30

## 2022-04-30 RX ORDER — DEXMEDETOMIDINE HYDROCHLORIDE 4 UG/ML
0-.6 INJECTION, SOLUTION INTRAVENOUS CONTINUOUS
Status: DISCONTINUED | OUTPATIENT
Start: 2022-04-30 | End: 2022-05-01

## 2022-04-30 RX ORDER — SODIUM CHLORIDE, SODIUM LACTATE, POTASSIUM CHLORIDE, CALCIUM CHLORIDE 600; 310; 30; 20 MG/100ML; MG/100ML; MG/100ML; MG/100ML
INJECTION, SOLUTION INTRAVENOUS CONTINUOUS
Status: DISCONTINUED | OUTPATIENT
Start: 2022-04-30 | End: 2022-05-01

## 2022-04-30 RX ORDER — DESMOPRESSIN ACETATE 4 UG/ML
0.25 INJECTION, SOLUTION INTRAVENOUS; SUBCUTANEOUS ONCE
Status: COMPLETED | OUTPATIENT
Start: 2022-04-30 | End: 2022-04-30

## 2022-04-30 RX ORDER — PREGABALIN 50 MG/1
50 CAPSULE ORAL 3 TIMES DAILY
Status: DISCONTINUED | OUTPATIENT
Start: 2022-05-01 | End: 2022-05-03

## 2022-04-30 RX ORDER — GADOBUTROL 604.72 MG/ML
8 INJECTION INTRAVENOUS
Status: COMPLETED | OUTPATIENT
Start: 2022-04-30 | End: 2022-04-30

## 2022-04-30 RX ADMIN — ACETAMINOPHEN 1000 MG: 500 TABLET ORAL at 04:04

## 2022-04-30 RX ADMIN — IPRATROPIUM BROMIDE AND ALBUTEROL SULFATE 3 ML: 2.5; .5 SOLUTION RESPIRATORY (INHALATION) at 12:04

## 2022-04-30 RX ADMIN — POLYETHYLENE GLYCOL 3350 17 G: 17 POWDER, FOR SOLUTION ORAL at 09:04

## 2022-04-30 RX ADMIN — SILODOSIN 4 MG: 4 CAPSULE ORAL at 09:04

## 2022-04-30 RX ADMIN — ACETAMINOPHEN 1000 MG: 500 TABLET ORAL at 05:04

## 2022-04-30 RX ADMIN — GADOBUTROL 8 ML: 604.72 INJECTION INTRAVENOUS at 04:04

## 2022-04-30 RX ADMIN — MUPIROCIN: 20 OINTMENT TOPICAL at 09:04

## 2022-04-30 RX ADMIN — SODIUM CHLORIDE SOLN NEBU 3% 4 ML: 3 NEBU SOLN at 08:04

## 2022-04-30 RX ADMIN — ETHAMBUTOL HYDROCHLORIDE 800 MG: 400 TABLET, FILM COATED ORAL at 09:04

## 2022-04-30 RX ADMIN — NOREPINEPHRINE BITARTRATE 0.02 MCG/KG/MIN: 4 INJECTION, SOLUTION INTRAVENOUS at 08:04

## 2022-04-30 RX ADMIN — DEXAMETHASONE SODIUM PHOSPHATE 4 MG: 4 INJECTION INTRA-ARTICULAR; INTRALESIONAL; INTRAMUSCULAR; INTRAVENOUS; SOFT TISSUE at 05:04

## 2022-04-30 RX ADMIN — SODIUM CHLORIDE, SODIUM LACTATE, POTASSIUM CHLORIDE, AND CALCIUM CHLORIDE: .6; .31; .03; .02 INJECTION, SOLUTION INTRAVENOUS at 09:04

## 2022-04-30 RX ADMIN — Medication 0.02 MCG/KG/MIN: at 08:04

## 2022-04-30 RX ADMIN — SENNOSIDES AND DOCUSATE SODIUM 1 TABLET: 50; 8.6 TABLET ORAL at 09:04

## 2022-04-30 RX ADMIN — DEXAMETHASONE SODIUM PHOSPHATE 4 MG: 4 INJECTION INTRA-ARTICULAR; INTRALESIONAL; INTRAMUSCULAR; INTRAVENOUS; SOFT TISSUE at 11:04

## 2022-04-30 RX ADMIN — IPRATROPIUM BROMIDE AND ALBUTEROL SULFATE 3 ML: 2.5; .5 SOLUTION RESPIRATORY (INHALATION) at 07:04

## 2022-04-30 RX ADMIN — ACETAMINOPHEN 1000 MG: 500 TABLET ORAL at 09:04

## 2022-04-30 RX ADMIN — METHOCARBAMOL TABLETS 500 MG: 500 TABLET, COATED ORAL at 05:04

## 2022-04-30 RX ADMIN — DEXAMETHASONE SODIUM PHOSPHATE 4 MG: 4 INJECTION INTRA-ARTICULAR; INTRALESIONAL; INTRAMUSCULAR; INTRAVENOUS; SOFT TISSUE at 06:04

## 2022-04-30 RX ADMIN — PREGABALIN 75 MG: 75 CAPSULE ORAL at 09:04

## 2022-04-30 RX ADMIN — DEXAMETHASONE SODIUM PHOSPHATE 4 MG: 4 INJECTION INTRA-ARTICULAR; INTRALESIONAL; INTRAMUSCULAR; INTRAVENOUS; SOFT TISSUE at 01:04

## 2022-04-30 RX ADMIN — HEPARIN SODIUM 5000 UNITS: 5000 INJECTION INTRAVENOUS; SUBCUTANEOUS at 09:04

## 2022-04-30 RX ADMIN — DAPTOMYCIN 600 MG: 500 INJECTION, POWDER, LYOPHILIZED, FOR SOLUTION INTRAVENOUS at 05:04

## 2022-04-30 RX ADMIN — SODIUM CHLORIDE, SODIUM LACTATE, POTASSIUM CHLORIDE, AND CALCIUM CHLORIDE 500 ML: .6; .31; .03; .02 INJECTION, SOLUTION INTRAVENOUS at 06:04

## 2022-04-30 RX ADMIN — AZITHROMYCIN MONOHYDRATE 500 MG: 250 TABLET ORAL at 09:04

## 2022-04-30 RX ADMIN — DESMOPRESSIN ACETATE 0.25 MCG: 4 SOLUTION INTRAVENOUS at 05:04

## 2022-04-30 RX ADMIN — IPRATROPIUM BROMIDE AND ALBUTEROL SULFATE 3 ML: 2.5; .5 SOLUTION RESPIRATORY (INHALATION) at 08:04

## 2022-04-30 RX ADMIN — DEXMEDETOMIDINE HYDROCHLORIDE 0.2 MCG/KG/HR: 4 INJECTION, SOLUTION INTRAVENOUS at 03:04

## 2022-04-30 RX ADMIN — SODIUM CHLORIDE SOLN NEBU 3% 4 ML: 3 NEBU SOLN at 07:04

## 2022-04-30 RX ADMIN — FAMOTIDINE 20 MG: 20 TABLET ORAL at 09:04

## 2022-04-30 RX ADMIN — QUETIAPINE FUMARATE 25 MG: 25 TABLET ORAL at 04:04

## 2022-04-30 RX ADMIN — HEPARIN SODIUM 5000 UNITS: 5000 INJECTION INTRAVENOUS; SUBCUTANEOUS at 04:04

## 2022-04-30 RX ADMIN — PRAVASTATIN SODIUM 10 MG: 10 TABLET ORAL at 09:04

## 2022-04-30 RX ADMIN — PREGABALIN 75 MG: 75 CAPSULE ORAL at 04:04

## 2022-04-30 RX ADMIN — METHOCARBAMOL TABLETS 500 MG: 500 TABLET, COATED ORAL at 01:04

## 2022-04-30 RX ADMIN — METHOCARBAMOL TABLETS 500 MG: 500 TABLET, COATED ORAL at 06:04

## 2022-04-30 RX ADMIN — ALBUMIN (HUMAN) 25 G: 12.5 INJECTION, SOLUTION INTRAVENOUS at 09:04

## 2022-04-30 RX ADMIN — SODIUM CHLORIDE, SODIUM LACTATE, POTASSIUM CHLORIDE, AND CALCIUM CHLORIDE 500 ML: .6; .31; .03; .02 INJECTION, SOLUTION INTRAVENOUS at 11:04

## 2022-04-30 RX ADMIN — SODIUM CHLORIDE: 0.45 INJECTION, SOLUTION INTRAVENOUS at 03:04

## 2022-04-30 RX ADMIN — HYDROMORPHONE HYDROCHLORIDE 1 MG: 1 INJECTION, SOLUTION INTRAMUSCULAR; INTRAVENOUS; SUBCUTANEOUS at 01:04

## 2022-04-30 RX ADMIN — HEPARIN SODIUM 5000 UNITS: 5000 INJECTION INTRAVENOUS; SUBCUTANEOUS at 05:04

## 2022-04-30 NOTE — SUBJECTIVE & OBJECTIVE
Review of Systems    Constitutional: Denies fevers, weight loss, chills, or weakness.  Eyes: Denies changes in vision.  ENT: Denies dysphagia, nasal discharge, ear pain or discharge.  Cardiovascular: Denies chest pain, palpitations, orthopnea, or claudication.  Respiratory: Denies shortness of breath, cough, hemoptysis, or wheezing.  GI: Denies nausea/vomitting, hematochezia, melena, abd pain, or changes in appetite.  : Denies dysuria, incontinence, or hematuria.  Musculoskeletal: Denies joint pain or myalgias.  Skin/breast: Denies rashes, lumps, lesions, or discharge.  Neurologic: Denies headache, dizziness, vertigo, or paresthesias.  Psychiatric: Denies changes in mood or hallucinations.  Endocrine: Denies polyuria, polydipsia, heat/cold intolerance.  Hematologic/Lymph: Denies lymphadenopathy, easy bruising or easy bleeding.  Allergic/Immunologic: Denies rash, rhinitis.    Objective:     Vitals:  Temp: 97.9 °F (36.6 °C)  Pulse: 64  Rhythm: normal sinus rhythm  BP: 125/68  MAP (mmHg): 91  Resp: 14  SpO2: 97 %  O2 Device (Oxygen Therapy): nasal cannula w/ humidification    Temp  Min: 97.8 °F (36.6 °C)  Max: 98.8 °F (37.1 °C)  Pulse  Min: 55  Max: 105  BP  Min: 110/60  Max: 156/90  MAP (mmHg)  Min: 80  Max: 118  Resp  Min: 12  Max: 34  SpO2  Min: 93 %  Max: 97 %    04/29 0701 - 04/30 0700  In: 4313.8 [I.V.:2342.1]  Out: 5670 [Urine:5500; Drains:170]   Unmeasured Output  Stool Occurrence: 0       Physical Exam  GA: Alert, comfortable, no acute distress.   HEENT: No scleral icterus or JVD.   Pulmonary: Clear to auscultation A/L.   Cardiac: RRR S1 & S2 w/o rubs/murmurs/gallops.   Abdominal: Bowel sounds present x 4. No appreciable hepatosplenomegaly.  Skin: No jaundice, rashes, or visible lesions.  Blood clots at urethral meatus   Neuro:  --GCS: E4 V4 M6  --Mental Status:  opens eyes, delayed responses, tracks, oriented to self only  --CN II-XII grossly intact.   --Pupils 3mm, PERRL.   --Corneal reflex, gag, cough  intact.  --LIU spont, BLE unable hold against gravity, RUE weak    Medications:  Continuoussodium chloride 0.45%, Last Rate: 125 mL/hr at 04/30/22 0602  dexmedetomidine (PRECEDEX) infusion, Last Rate: 0.3 mcg/kg/hr (04/30/22 0602)  NORepinephrine bitartrate-D5W, Last Rate: 0.02 mcg/kg/min (04/30/22 0839)  Scheduledacetaminophen, 1,000 mg, Q8H  albuterol-ipratropium, 3 mL, Q6H  azithromycin, 500 mg, Daily  DAPTOmycin (CUBICIN)  IV, 8 mg/kg, Q24H  dexamethasone, 4 mg, Q6H  ethambutoL, 800 mg, Daily  famotidine, 20 mg, BID  heparin (porcine), 5,000 Units, Q8H  lactated ringers, 500 mL, Once  methocarbamoL, 500 mg, Q6H  mucus clearing device, , BID  mupirocin, , BID  nebulizer and compressor, 1 Device, BID  polyethylene glycol, 17 g, Daily  pravastatin, 10 mg, Daily  pregabalin, 75 mg, TID  QUEtiapine, 25 mg, QHS  senna-docusate 8.6-50 mg, 1 tablet, BID  silodosin, 4 mg, Daily  sodium chloride 3%, 4 mL, BID  PRNacetaminophen, 650 mg, Q6H PRN  calcium gluconate IVPB, 1 g, PRN  calcium gluconate IVPB, 2 g, PRN  calcium gluconate IVPB, 3 g, PRN  dextrose 10%, 12.5 g, PRN  dextrose 10%, 25 g, PRN  fentaNYL, 25 mcg, Q2H PRN  glucagon (human recombinant), 1 mg, PRN  HYDROmorphone, 1 mg, Q4H PRN  insulin aspart U-100, 0-10 Units, Q6H PRN  magnesium sulfate IVPB, 2 g, PRN  magnesium sulfate IVPB, 4 g, PRN  melatonin, 6 mg, Nightly PRN  metoclopramide HCl, 5 mg, Q6H PRN  ondansetron, 4 mg, Q8H PRN  potassium chloride, 40 mEq, PRN   And  potassium chloride, 60 mEq, PRN   And  potassium chloride, 80 mEq, PRN  sodium chloride 0.9%, 10 mL, PRN  sodium chloride 0.9%, 10 mL, PRN    Today I personally reviewed pertinent medications, lines/drains/airways, imaging, cardiology results, laboratory results, microbiology results, MRI pending    Diet  Diet Dysphagia Pureed (IDDSI Level 4) Nectar Thick

## 2022-04-30 NOTE — PLAN OF CARE
PT anel complete, plan of care established    2022    Problem: Physical Therapy  Goal: Physical Therapy Goal  Description: Goals to be met by: 2022     Patient will increase functional independence with mobility by performin. Supine to sit with minimum assistance  2. Sit to supine with minimum assistance  3. Sit to stand transfer with minimum assistance  4. Bed to chair transfer with minimum assistance using LRAD as needed  5. Gait  x 25 feet with minimum assistance using LRAD as needed  6. Lower extremity exercise program x10 reps per handout, with independence  7. Recall 3/3 spinal precautions and how to don/doff c-collar with supervision   Outcome: Ongoing, Progressing

## 2022-04-30 NOTE — PLAN OF CARE
[facility] Care Plan    POC reviewed with Diego Gunter and family at 0400. Pt unable to verbalized understanding. Questions and concerns addressed. Pt has been more delirious and agitated throughout the night. Precedex started. Pt progressing toward goals. Will continue to monitor. See below and flowsheets for full assessment and VS info.   -12lead  -500ml NS bolus  -1000ml LR bolus  -precedex started              Is this a stroke patient? yes    Neuro:  Hasmukh Coma Scale  Best Eye Response: 4-->(E4) spontaneous  Best Motor Response: 5-->(M5) localizes pain  Best Verbal Response: 4-->(V4) confused  [place] Coma Scale Score: 13  Assessment Qualifiers: patient not sedated/intubated  Pupil PERRLA: yes     24 hr Temp:  [97.8 °F (36.6 °C)-98.8 °F (37.1 °C)]     CV:   Rhythm: normal sinus rhythm  BP goals:   SBP < 100  MAP > 85    Resp:   O2 Device (Oxygen Therapy): nasal cannula       Plan: N/A    GI/:     Diet/Nutrition Received: NPO  Last Bowel Movement: 04/27/22  Voiding Characteristics: ureteral catheter    Intake/Output Summary (Last 24 hours) at 4/30/2022 0423  Last data filed at 4/30/2022 0402  Gross per 24 hour   Intake 5651.18 ml   Output 6705 ml   Net -1053.82 ml     Unmeasured Output  Stool Occurrence: 0    Labs/Accuchecks:  Recent Labs   Lab 04/29/22  0225   WBC 11.09   RBC 4.26*   HGB 11.6*   HCT 36.2*         Recent Labs   Lab 04/30/22  0327   *   K 4.0   CO2 24   *   BUN 7*   CREATININE 0.7   ALKPHOS 56   ALT 20   AST 31   BILITOT 0.4      Recent Labs   Lab 04/30/22  0327   INR 1.7*   APTT 41.3*      Recent Labs   Lab 04/29/22  0549 04/30/22  0327   CPK  --  500*  500*   CPKMB  --  9.6*   TROPONINI <0.006  --    MB  --  1.9       Electrolytes: No replacement orders  Accuchecks: Q4H    Gtts:   sodium chloride 0.45% 125 mL/hr at 04/30/22 0402    dexmedetomidine (PRECEDEX) infusion 0.2 mcg/kg/hr (04/30/22 5163)       LDA/Wounds:  Lines/Drains/Airways       Drain  Duration                   Closed/Suction Drain 04/28/22 1918 Left Back Accordion 10 Fr. 1 day         Closed/Suction Drain 04/28/22 1918 Right Back Accordion 10 Fr. 1 day         Urethral Catheter 04/28/22 1650 Double-lumen;Non-latex;Straight-tip 16 Fr. 1 day              Arterial Line  Duration             Arterial Line 04/28/22 1600 Left 1 day              Peripheral Intravenous Line  Duration                  Peripheral IV - Single Lumen 04/28/22 1652 18 G Left Forearm 1 day         Peripheral IV - Single Lumen 04/29/22 0015 20 G Anterior;Right Upper Arm 1 day         Peripheral IV - Single Lumen 04/30/22 0000 18 G Anterior;Distal;Right Forearm <1 day                  Wounds: Yes  Wound care consulted: No

## 2022-04-30 NOTE — PLAN OF CARE
Problem: Occupational Therapy  Goal: Occupational Therapy Goal  Description: Goals to be met by: 5/28/2022     Patient will increase functional independence with ADLs by performing:    Feeding with Set-up Assistance.  UE Dressing with Minimal Assistance.  LE Dressing with Moderate Assistance.  Grooming while seated with Set-up Assistance.  Toileting from bedside commode with Minimal Assistance for hygiene and clothing management.   Sitting at edge of bed x15 minutes with Supervision.  Toilet transfer to bedside commode with Contact Guard Assistance.    Outcome: Ongoing, Progressing     Evaluation complete-see note for details. Goals and POC established.

## 2022-04-30 NOTE — NURSING
1949 pt MAP sustained at 80, HOB lowered, legs elevated with no change. Elida GARCIA notified, order for NS  500ml bolus. Orders carried out, will continue to monitor.    0300 Pt increasingly agitated, tries to get out of bed and refuses medications. Sheila PÉREZ notified, pending low dose precedex order. Will carry out order and continue to monitor.

## 2022-04-30 NOTE — PT/OT/SLP EVAL
Occupational Therapy  Co- Evaluation and Treatment w/ PT    Additional staff present: PT for co-eval/tx due to patient's medical complexities requiring two skilled therapists in order to appropriately assess patient's functional deficits as well as ensure patient safety, accommodate for limited activity tolerance, and provide appropriate, skilled assistance to maximize functional potential during evaluation.    Name: Diego Gunter  MRN: [id number]  Admitting Diagnosis:  Epidural hematoma 2 Days Post-Op  Length of Stay: 2 days    Procedure(s):  LAMINECTOMY, SPINE, CERVICAL, POSTERIOR APPROACH C3-6     Recommendations:     Discharge Recommendations: rehabilitation facility  Discharge Equipment Recommendations:   (TBD)  Barriers to discharge:   (increased assistance needed)    Plan:     Patient to be seen 4 x/week to address the above listed problems via self-care/home management, therapeutic activities, therapeutic exercises, neuromuscular re-education  · Plan of Care Expires: 05/29/22  · Plan of Care Reviewed with: patient, family, spouse    Assessment:     Diego Gunter is a 81 y.o. male with a medical diagnosis of Epidural hematoma.  He presents with the following performance deficits affecting function: weakness, impaired endurance, impaired self care skills, impaired functional mobilty, impaired balance, gait instability, decreased safety awareness, decreased lower extremity function, decreased upper extremity function, impaired coordination, decreased ROM, impaired fine motor, impaired joint extensibility.  Pt would benefit from skilled OT services in order to maximize independence with ADLs and facilitate safe discharge. Pt's clinical condition meets full inpatient rehab criteria. Inpatient rehab will provide to total interdisciplinary treatment approach needed. Pt is at high risk of unplanned readmission due to fall risk.     Time spent evaluating pt, performing bed mobility, EOB activity w/ focus on  sitting balance and coordination trasks    Relevant hx and current limitations:   Affected side: right   Speech: dysarthric   Neglect/Inattention: R inattention (Max verbal/visual cues to attend R)    Command Following: ~50% simple command following (Nikolai)   EOB sitting: Total-Mod A (brief moments of CGA)  o Left posterolateral lean   Diet: nectar liquids and pureed diet   Global ataxia   Persistent confusion throughout session despite max efforts for re-direction     Rehab Prognosis: Good; patient would benefit from acute skilled OT services to address these deficits and reach maximum level of function.       Subjective   Communicated with: SHARON Martinez prior to session.  Patient found HOB elevated with bed alarm, blood pressure cuff, pulse ox (continuous), telemetry, SCD, cervical collar, amaya catheter, peripheral IV, hemovac upon OT entry to room.    Chief Complaint: Back Pain (Here via [facility] with c/o upper back pain and right arm pain, taking warfarin and azithromycin, Hx PE and mycobacterium) and Transfer ([facility] transfer for epidural abscess)    Patient/Family Comments/goals: Want him to get better    Pain/Comfort:  · Pain Rating 1: 0/10    Patients cultural, spiritual, L[employer] day conflicts given the current situation: no    Occupational Profile:  Living Environment: lives w/ spouse in [facility] [address]; t/s combo  Prior Level of Function: Patient reports being Independent with mobility & with ADLs.   Roles/Repsonsibilities:   Hand Dominance: right   Work: no. retired  Drive: no.   Managing Medicines/Managing Home: yes.   Equipment Used at Home:  grab bar, nebulizer    Patient reports they will have assistance from spouse upon discharge.      Objective:     Patient found with: bed alarm, blood pressure cuff, pulse ox (continuous), telemetry, SCD, cervical collar, amaya catheter, peripheral IV, hemovac   General Precautions: fall, aspiration   Orthopedic Precautions:N/A   Braces: Cervical collar   Oxygen  "Device: Nasal Cannula 2L  Vitals: /68   Pulse 64   Temp 97.9 °F (36.6 °C) (Oral)   Resp 14   Ht 5' 9" (1.753 m)   Wt 74.8 kg (165 lb)   SpO2 97%   BMI 24.37 kg/m²     Cognitive and Psychosocial Function:   · AOx3 -- Person, Place and Time   · Follows Commands/attention:follows one-step commands ~25% of the time  · Communication:  dysarthria  · Memory: Poor immediate recall  · Safety awareness/insight to disability: impaired     Hearing: Hard of Hearing    Vision:  Right inattention    Physical Exam:     Left UE Right UE   UE Edema N/A N/A   UE ROM AROM: WFL PROM: painful, unable to assess due to contracted Shoulder    UE Strength unable to assess due to impaired command following unable to assess due to impaired command following    Strength WFL WFL   Sensation normal diminished   Fine Motor Coordination:  Impaired: finger to thumb Impaired: finger to thumb   Gross Motor Coordination: Impaired: finger to nose Impaired: finger to nose     Occupational Performance:  Bed Mobility:       Rolling left: maximal assistance  Rolling right: moderate assistance   Scooting: towards EOB with maximal assistance   Supine to Sit: maximal assistance and 2 persons   Sit to Supine: maximal assistance and 2 persons    Functional Mobility/Transfers:     Sit to Stand: maximal assistance and of 2 persons using hand-held assist   o Pt req'd Total A x2 persons to remain standing for ~5 sec; B knees buckling    Activities of Daily Living:     Not observed this date    AMPA 6 Click ADL:  AMPAC Total Score: 6    Neuromuscular Re-Education:  Pt w/ poor sitting balance sitting EOB that steadily improved from Total-Mod A (brief moments of CGA) d/t positioning and engaging pt in core strengthening exercises  Pt participated in exercises that work on dynamic sitting balance: crossing midline and recovering from leaning to far to one side or forward    Treatment & Education:   Therapist provided facilitation and " instruction of proper body mechanics, energy conservation, and fall prevention strategies during tasks listed above.   Pt educated on role of OT, POC and goals for therapy   Updated communication board with level of assist required (unsafe) & educated RN/patient that pt is NOT appropriate for transfers and mobility with RN/PCT.       Patient left HOB elevated with all lines intact, call button in reach, RN notified and RN and family present, per RN restraints did not need to be retied at end of session. Floyd catheter incidentally removed during session, RN informed and present at end of session to address    GOALS:   Multidisciplinary Problems     Occupational Therapy Goals        Problem: Occupational Therapy    Goal Priority Disciplines Outcome Interventions   Occupational Therapy Goal     OT, PT/OT Ongoing, Progressing    Description: Goals to be met by: 5/28/2022     Patient will increase functional independence with ADLs by performing:    Feeding with Set-up Assistance.  UE Dressing with Minimal Assistance.  LE Dressing with Moderate Assistance.  Grooming while seated with Set-up Assistance.  Toileting from bedside commode with Minimal Assistance for hygiene and clothing management.   Sitting at edge of bed x15 minutes with Supervision.  Toilet transfer to bedside commode with Contact Guard Assistance.                     History:     Past Medical History:   Diagnosis Date    Antiphospholipid syndrome 11/19/2021    Dysphagia     Hx of colonic polyps     followed by GI. Dr. Pham    Hyperlipidemia LDL goal <100     Overweight(278.02)     Prostate cancer september 2011    followed by urology, Dr. Rogers    Type II or unspecified type diabetes mellitus without mention of complication, not stated as uncontrolled     diet controlled       Past Surgical History:   Procedure Laterality Date    BRONCHOSCOPY N/A 10/15/2018    Procedure: BRONCHOSCOPY;  [facility];  Location: [facility] OR  2ND FLR;  Service: Anesthesiology;  Laterality: N/A;    CATARACT EXTRACTION, BILATERAL  2014    POSTERIOR CERVICAL LAMINECTOMY Bilateral 4/28/2022    Procedure: LAMINECTOMY, SPINE, CERVICAL, POSTERIOR APPROACH C3-6;  Surgeon: Carlos Miller MD;  Location: [facility] OR 2ND FLR;  Service: Neurosurgery;  Laterality: Bilateral;    PROSTATECTOMY         Time Tracking:       OT Date of Treatment: 04/30/22  OT Start Time: 1124  OT Stop Time: 1153  OT Total Time (min): 29 min    Additional staff present: PT     Billable Minutes:  Evaluation 10  Neuromuscular Re-education 19      Verna (Jose Eduardo), OTR/L  [phone] (Pager #)  4/30/2022

## 2022-04-30 NOTE — PT/OT/SLP EVAL
Physical Therapy Co-Evaluation and Co-Treatment    Patient Name:  Diego Gunter   MRN:  [id number]    Recommendations:     Discharge Recommendations:  rehabilitation facility   Discharge Equipment Recommendations:  (TBD)   Barriers to discharge: Increased level of assist    Assessment:     Diego Gunter is a 81 y.o. male admitted with a medical diagnosis of Epidural hematoma.  He presents with the following impairments/functional limitations:  weakness, impaired endurance, impaired self care skills, impaired functional mobilty, impaired balance, gait instability, decreased safety awareness, decreased lower extremity function, decreased upper extremity function, impaired coordination, decreased ROM, impaired fine motor, impaired joint extensibility, impaired sensation, orthopedic precautions. These deficits affect their roles and responsibilities in which they were able to complete prior to admit. Diego Gunter would benefit from acute PT intervention to improve quality of life and focus on recovery of impairments. Once medically stable, recommending pt discharge to Inpatient Rehabilitation Facility. Patient tolerated evaluation well however demonstrates confusion and global ataxia limiting ability to participate in safe gait trial this date.    Rehab Prognosis: Good; patient would benefit from acute skilled PT services 4 x/week to address these deficits and reach maximum level of function. Patient is most appropriate to go to rehabilitation facility.  Recent Surgery: Procedure(s) (LRB):  LAMINECTOMY, SPINE, CERVICAL, POSTERIOR APPROACH C3-6 (Bilateral) 2 Days Post-Op    Plan:     During this hospitalization, patient to be seen 4 x/week to address the identified rehab impairments via gait training, therapeutic activities, therapeutic exercises, neuromuscular re-education and progress toward the following goals:    · Plan of Care Expires:  05/30/22    Subjective     Chief Complaint: None verbalized  "  Patient/Family Comments/Goals: "He was fine up until 2:30 AM on Thursday"  Pain/Comfort:  · Pain Rating 1: 0/10    Patients cultural, spiritual, Mormonism conflicts given the current situation: no    Living Environment:  Patient lives with their spouse in a single story home, 0 [place], tub-shower combo.  Prior to admission, patient was independent with ADLs, driving, not working. Patient uses DME as follows: grab bar, nebulizer. DME owned (not currently used): none. Upon discharge, patient will have assistance from family. Grandson is able to assist.    Objective:     Communicated with nursing prior to session.  Patient found HOB elevated with bed alarm, blood pressure cuff, pulse ox (continuous), telemetry, SCD, cervical collar, amaya catheter, peripheral IV, hemovac, oxygen, restraints upon PT entry to room.    General Precautions: Standard, fall, aspiration   Orthopedic Precautions:spinal precautions   Braces: Cervical collar    Exams:  · Cognitive Exam:  Patient is oriented to Person, Place, Time, Not oriented to situation, follows commands 25% of the time  · RLE ROM: WFL  · RLE Strength: 1/5, limited assessment due to ataxia  · LLE ROM: WFL  · LLE Strength: WFL, grossly 4/5 , limited assessment due to ataxia  · Sensation: -       Impaired  light/touch to RLE, correct on 2/3 trials   · Coordination: significant overall ataxia noted, increased on R compared to L    Functional Mobility:  · Bed Mobility:     · Rolling Left:  maximal assistance  · Rolling Right: moderate assistance  · Scooting: maximal assistance  · Supine to Sit: maximal assistance of 2 persons for log rolling technique  · Sit to Supine: maximal assistance of 2 persons for log rolling technique  · Transfers:     · Sit to Stand: maximal assistance of 2 persons with hand-held assist  · Gait: Deferred due to poor standing balance, B knee buckling noted  · Balance:   · Static Sitting: Poor, able to maintain for 5 minute(s) with brief moments of contact " guard assistance, grossly moderate - total assistance, demonstrates posterior and L lean, requires verbal and tactile cuing to orient to midline  · Dynamic Sitting: Poor: Patient unable to accept challenge or move without loss of balance, moderate - total assistance, increased posterior lean  · Static Standing: Poor, able to maintain for 8 seconds with total assistance of 2 persons, B knee buckling and unable to maintain, returned to seated for safety  · Dynamic Standing: not assessed this visit    Therapeutic Activities and Exercises:  Patient educated on role of acute care PT and PT POC and call light usage  Educated on spinal precautions including avoidance of bending, lifting, and twisting. Patient expresses understanding  Educated on log roll technique, performed to left  Whiteboard updated, Patient is not clear to transfer with RN/PCT   Adjusted cervical collar fit    AM-PAC 6 CLICK MOBILITY  Total Score:9     Patient left HOB elevated with all lines intact, call button in reach, bed alarm off, RN, significant other and family present and restraints not reapplied at end of session per RN. Floyd catheter incidentally removed during session, RN to bedside to adress    GOALS:   Multidisciplinary Problems     Physical Therapy Goals        Problem: Physical Therapy    Goal Priority Disciplines Outcome Goal Variances Interventions   Physical Therapy Goal     PT, PT/OT Ongoing, Progressing     Description: Goals to be met by: 2022     Patient will increase functional independence with mobility by performin. Supine to sit with minimum assistance  2. Sit to supine with minimum assistance  3. Sit to stand transfer with minimum assistance  4. Bed to chair transfer with minimum assistance using LRAD as needed  5. Gait  x 25 feet with minimum assistance using LRAD as needed  6. Lower extremity exercise program x10 reps per handout, with independence  7. Recall 3/3 spinal precautions and how to don/doff c-collar  with supervision                    History:     Past Medical History:   Diagnosis Date    Antiphospholipid syndrome 11/19/2021    Dysphagia     Hx of colonic polyps     followed by GI. Dr. Pham    Hyperlipidemia LDL goal <100     Overweight(278.02)     Prostate cancer september 2011    followed by urology, Dr. Rogers    Type II or unspecified type diabetes mellitus without mention of complication, not stated as uncontrolled     diet controlled       Past Surgical History:   Procedure Laterality Date    BRONCHOSCOPY N/A 10/15/2018    Procedure: BRONCHOSCOPY;  [facility];  Location: [facility] OR [address] [place] [address];  Service: Anesthesiology;  Laterality: N/A;    CATARACT EXTRACTION, BILATERAL  2014    POSTERIOR CERVICAL LAMINECTOMY Bilateral 4/28/2022    Procedure: LAMINECTOMY, SPINE, CERVICAL, POSTERIOR APPROACH C3-6;  Surgeon: Carlos Miller MD;  Location: [facility] OR [address] [place] [address];  Service: Neurosurgery;  Laterality: Bilateral;    PROSTATECTOMY         Time Tracking:     PT Received On: 04/30/22  PT Start Time: 1124     PT Stop Time: 1151  PT Total Time (min): 27 min     Billable Minutes: Evaluation 10 min Therapeutic Activity 17 min     04/30/2022    Co-evaluation and co-treatment performed for this visit due to suspected patient need for two skilled therapists to ensure patient and staff safety and to accommodate for patient activity tolerance/pain management

## 2022-04-30 NOTE — PLAN OF CARE
[facility] Care Plan    POC reviewed with Diego Gunter and family at 1400. Pt verbalized understanding. Questions and concerns addressed. No acute events today. Pt progressing toward goals. Will continue to monitor. See below and flowsheets for full assessment and VS info.   -precedex stopped at 0800 this morning. Pt more orientated and alert most of shift  -when pt falls asleep - HR drops seen as low as 40 and BP drops with MAPs as low as 50. Levo started to keep MAPs > 80  -0.45 NS continued at 125 ml/hr. 500 ml LR bolus given   - MRI and cervical xray complete   -amaya exchanged - see other note         Is this a stroke patient? no    Neuro:  Hasmukh Coma Scale  Best Eye Response: 4-->(E4) spontaneous  Best Motor Response: 6-->(M6) obeys commands  Best Verbal Response: 4-->(V4) confused  [place] Coma Scale Score: 14  Assessment Qualifiers: patient not sedated/intubated, no eye obstruction present  Pupil PERRLA: yes     24 hr Temp:  [97.8 °F (36.6 °C)-98 °F (36.7 °C)]     CV:   Rhythm: sinus bradycardia  BP goals:   SBP < 160  MAP > 80    Resp:   O2 Device (Oxygen Therapy): nasal cannula       Plan: N/A    GI/:     Diet/Nutrition Received: mechanical/dental soft  Last Bowel Movement: 04/27/22  Voiding Characteristics: urethral catheter (bladder)    Intake/Output Summary (Last 24 hours) at 4/30/2022 1605  Last data filed at 4/30/2022 1550  Gross per 24 hour   Intake 4284.06 ml   Output 2770 ml   Net 1514.06 ml     Unmeasured Output  Stool Occurrence: 0    Labs/Accuchecks:  Recent Labs   Lab 04/30/22  1246   WBC 22.15*   RBC 3.55*   HGB 9.9*   HCT 31.0*         Recent Labs   Lab 04/30/22  0327 04/30/22  0556 04/30/22  1246   *   < > 144   K 4.0  --   --    CO2 24  --   --    *  --   --    BUN 7*  --   --    CREATININE 0.7  --   --    ALKPHOS 56  --   --    ALT 20  --   --    AST 31  --   --    BILITOT 0.4  --   --     < > = values in this interval not displayed.      Recent Labs   Lab  04/30/22 0327   INR 1.7*   APTT 41.3*      Recent Labs   Lab 04/29/22  0549 04/30/22 0327   CPK  --  500*  500*   CPKMB  --  9.6*   TROPONINI <0.006  --    MB  --  1.9       Electrolytes: N/A - electrolytes WDL  Accuchecks: Q6H    Gtts:   sodium chloride 0.45% Stopped (04/30/22 1455)    dexmedetomidine (PRECEDEX) infusion Stopped (04/30/22 0744)    NORepinephrine bitartrate-D5W Stopped (04/30/22 0849)       LDA/Wounds:  Lines/Drains/Airways       Drain  Duration                  Closed/Suction Drain 04/28/22 1918 Left Back Accordion 10 Fr. 1 day         Closed/Suction Drain 04/28/22 1918 Right Back Accordion 10 Fr. 1 day         Urethral Catheter 04/30/22 1000 Double-lumen 20 Fr. <1 day              Arterial Line  Duration             Arterial Line 04/28/22 1600 Left 2 days              Peripheral Intravenous Line  Duration                  Peripheral IV - Single Lumen 04/28/22 1652 18 G Left Forearm 1 day         Peripheral IV - Single Lumen 04/29/22 0015 20 G Anterior;Right Upper Arm 1 day         Peripheral IV - Single Lumen 04/30/22 0000 18 G Anterior;Distal;Right Forearm <1 day                  Wounds: No  Wound care consulted: No

## 2022-04-30 NOTE — ASSESSMENT & PLAN NOTE
Kept amaya in post op d/t IVF running for MAP goals and urinary retention noted in OR  -silodosin     4/30 traumatic amaya removal by confused pt; new amaya inserted; no clots during amaya flushes; blood at urethral meatus

## 2022-04-30 NOTE — SUBJECTIVE & OBJECTIVE
Interval History:  pt s/p C3-6 PCF and epidural hematoma evac on . Nae[place], continued delirium FU MRI C spine today for post op eval      Review of patient's allergies indicates:  No Known Allergies    Past Medical History:   Diagnosis Date    Antiphospholipid syndrome 2021    Dysphagia     Hx of colonic polyps     followed by GI. Dr. Pham    Hyperlipidemia LDL goal <100     Overweight(278.02)     Prostate cancer 2011    followed by urology, Dr. Rogers    Type II or unspecified type diabetes mellitus without mention of complication, not stated as uncontrolled     diet controlled     Past Surgical History:   Procedure Laterality Date    BRONCHOSCOPY N/A 10/15/2018    Procedure: BRONCHOSCOPY;  Surgeon: Pratibha Diagnostic Provider;  Location: [facility] OR [address] [place] [address];  Service: Anesthesiology;  Laterality: N/A;    CATARACT EXTRACTION, BILATERAL      POSTERIOR CERVICAL LAMINECTOMY Bilateral 2022    Procedure: LAMINECTOMY, SPINE, CERVICAL, POSTERIOR APPROACH C3-6;  Surgeon: Carlos Miller MD;  Location: [facility] OR [address] [place] [address];  Service: Neurosurgery;  Laterality: Bilateral;    PROSTATECTOMY       Family History       Problem Relation (Age of Onset)    Colon cancer Mother    Dementia Brother    Diabetes Mother, Sister, , Brother, Brother, Maternal Aunt    Heart disease Father    Lung cancer Brother    Mental illness Sister    Myasthenia gravis Brother    Other Brother    Parkinsonism Brother    Pulmonary fibrosis Brother          Tobacco Use    Smoking status: Former Smoker     Packs/day: 0.25     Years: 5.00     Pack years: 1.25     Quit date: 10/2/1962     Years since quittin.6    Smokeless tobacco: Never Used    Tobacco comment: quit age 23   Substance and Sexual Activity    Alcohol use: Yes     Comment: beer occasionally    Drug use: No    Sexual activity: Yes     Partners: Female     Review of Systems   Constitutional:  Negative for chills, fatigue and fever.   Eyes:  Negative for photophobia and  visual disturbance.   Respiratory:  Negative for cough and shortness of breath.    Cardiovascular:  Negative for chest pain, palpitations and leg swelling.   Gastrointestinal:  Negative for constipation, diarrhea, nausea and vomiting.   Genitourinary:  Negative for difficulty urinating, dysuria, frequency and urgency.   Musculoskeletal:  Positive for myalgias. Negative for back pain, gait problem and neck pain.   Neurological:  Positive for weakness (right upper and lower extremities). Negative for dizziness, seizures, speech difficulty, numbness and headaches.   Psychiatric/Behavioral:  Negative for confusion. The patient is not nervous/anxious.      Objective:     Weight: 74.8 kg (165 lb)  Body mass index is 24.37 kg/m².  Vital Signs (Most Recent):  Temp: 97.8 °F (36.6 °C) (04/30/22 1105)  Pulse: (!) 45 (04/30/22 1305)  Resp: 14 (04/30/22 1305)  BP: 124/60 (04/30/22 1305)  SpO2: 98 % (04/30/22 1305)   Vital Signs (24h Range):  Temp:  [97.8 °F (36.6 °C)-98.8 °F (37.1 °C)] 97.8 °F (36.6 °C)  Pulse:  [] 45  Resp:  [12-34] 14  SpO2:  [93 %-98 %] 98 %  BP: (110-156)/(59-90) 124/60  Arterial Line BP: (123-156)/(53-85) 125/54     Date 04/30/22 0700 - 05/01/22 0659   Shift 5488-4132 7501-2904 7274-4685 24 Hour Total   INTAKE   I.V.(mL/kg) 837.9(11.2)   837.9(11.2)   IV Piggyback 1000.4   1000.4   Shift Total(mL/kg) 1838.3(24.6)   1838.3(24.6)   OUTPUT   Urine(mL/kg/hr) 975   975   Shift Total(mL/kg) 975(13)   975(13)   Weight (kg) 74.8 74.8 74.8 74.8           Neurosurgery Physical Exam  [id number]  Baseline dementia confounded by delirium  Awake, NAD, confused, disoriented  Moving BUE antigravity with baseline RUE contracture  WD to pain in BLE, spont movement in both L>R  Unable to fully assess motor due to confusion, poor cooperation        Significant Labs:  Recent Labs   Lab 04/28/22  2104 04/29/22  0225 04/29/22  0434 04/30/22  0327 04/30/22  0556 04/30/22  1246   * 172*  --  141*  --   --     150*   149*   < > 148* 147* 144   K 4.2 3.9  --  4.0  --   --     113*  --  114*  --   --    CO2 28 26  --  24  --   --    BUN 8 6*  --  7*  --   --    CREATININE 0.8 0.8  --  0.7  --   --    CALCIUM 8.4* 8.6*  --  8.1*  --   --    MG  --  2.2  --  2.0  --   --     < > = values in this interval not displayed.       Recent Labs   Lab 04/29/22 0225 04/30/22  0327 04/30/22  1246   WBC 11.09 21.89* 22.15*   HGB 11.6* 10.8* 9.9*   HCT 36.2* 34.6* 31.0*    240 234       Recent Labs   Lab 04/29/22 0225 04/29/22  0549 04/30/22 0327   INR 1.3*  --  1.7*   APTT 33.0* 33.1* 41.3*       Microbiology Results (last 7 days)       Procedure Component Value Units Date/Time    Blood culture [[id number]] Collected: 04/28/22 1201    Order Status: Completed Specimen: Blood from Peripheral, Forearm, Right Updated: 04/30/22 1303     Blood Culture, Routine No Growth to date      No Growth to date      No Growth to date    Blood culture [[id number]] Collected: 04/28/22 1201    Order Status: Completed Specimen: Blood from Peripheral, Hand, Left Updated: 04/30/22 1303     Blood Culture, Routine No Growth to date      No Growth to date      No Growth to date          All pertinent labs from the last 24 hours have been reviewed.    Significant Diagnostics:    Imaging Results              SURG FL Surgery Fluoro Usage (Final result)  Result time 04/28/22 19:52:20      Final result by Access, Silent  (04/28/22 19:52:20)                   Narrative:    See OP Notes for results.     IMPRESSION: See OP Notes for results.             This procedure was auto-finalized by: Virtual Radiologist                                      MRI Cervical Spine W WO Cont (Final result)  Result time 04/28/22 11:30:13   Procedure changed from MRI Cervical Spine With Contrast     Final result by Rhett Conway MD (04/28/22 11:30:13)                   Impression:      1. Abnormal nonspecific posterior epidural fluid, differential diagnosis  includes hematoma and abscess.  This in conjunction with juxta-articular posterior soft tissue edema particularly on left and inter spinous process region, superimposed muscle strain posttraumatic change favored in may be responsible for the posterior epidural fluid which could represent hematoma.  No osteomyelitis or septic facet joint disease or discitis or acute fracture noted on CTA neck exam.  2. Prominent degenerative disc spondylosis facet joint arthropathy changes particularly C3 through C6 levels.  3.  This report was flagged in Epic as abnormal.  4. Neuro surgical consultation suggested as well as clinical correlation.    COMMUNICATION  This critical result was discovered/received at 11:15 04/28/2022.  The critical information above was relayed directly by me by telephone to Dr Bay on 04/28/2022 at 1229 hours.      Electronically signed by: Rhett Conway MD  Date:    04/28/2022  Time:    11:30               Narrative:    EXAMINATION:  MRI CERVICAL SPINE W WO CONTRAST    CLINICAL HISTORY:  cervical spine extending from C3-C6 that may be artifact however the appearance is most concerning for the possibility of an intraspinal epidural process to include the possibility of epidural fluid collection, abscess or hematoma;    TECHNIQUE:  MRI cervical spine without and with intravenous Gadavist 8 cc.    COMPARISON:  CT and MRI imaging of brain, cervical spine dating back 2014.    FINDINGS:  Levoscoliosis cervicothoracic junction with tilting head right.  DJD chronic change anterior C1-C2 with mild hypertrophy transverse ligament and mild indentation adjacent cranial cervical junction.  Marrow space normal.  Degenerative disc spondylosis particularly to C4 through C6.  No fracture subluxation.    Posterior juxta-articular edema noted about left facet joints from C2 through C7 levels as well as between posterior spinous processes, limited similar juxta-articular edema right posterior lateral facet joint  structures.  Abnormality right upper lobe suprahilar region, please refer to CTA chest same day report.    Posterior epidural fluid noted to extend from C2-C3-T1 level with isointense signal spinal cord on T1, heterogeneous increased signal on T2 and FLAIR and limited enhancement of margins on post contrasted exam.  Study obscured by motion artifact particularly postcontrast exam.  Overall exam is of satisfactory diagnostic quality.    C2-C3 posterior disc normal, facet joint arthropathy with mild moderate right foramen and no left foramen and mild spinal stenosis.    C3-C4 mild moderate posterior central disc desiccated bulge, mild compression anterior spinal cord, moderate soft tissue spinal stenosis in conjunction with posterior epidural compression of spinal cord mild moderate degree.    C4-C5 mild moderate posterior disc bulge spondylosis, mild compression anterior spinal cord, severe compression posterior spinal cord by epidural fluid particularly on right, facet joint arthropathy with mild moderate foraminal and moderate severe soft tissues spinal stenosis.    C5-C6 mild moderate posterior disc bulge spondylosis, paracentral prominent left, mild moderate compression anterior spinal cord, moderate compression posterior spinal cord by epidural fluid, facet joint arthropathy with mild moderate foramen stenosis particularly on left, moderate soft tissue spinal stenosis.    C6-C7 mild moderate posterior disc bulge spondylosis, posterior central annular fissure, moderate compression anterior spinal cord, additional mild moderate compression posterior spinal cord by a epidural fluid, facet joint arthropathy, mild moderate foraminal stenosis more prominent on left, moderate soft tissue spinal stenosis.    C7-T1 posterior disc margin normal.  Some hypertrophy posterior spinal ligament, mild moderate compression posterior spinal sac, mild moderate soft tissue spinal stenosis.    Posterior spinal fluid collection  extends to level of T1-T2 with mild moderate compression posterior spinal sac and mild moderate spinal stenosis.  Upper dorsal spinal cord and spinal canal otherwise appears intact.    No additional abnormal intra-axial extra-axial enhancement post-contrast exam.  No definite abnormal signal within spinal cord.                                       MRI Brain W WO Contrast (Final result)  Result time 04/28/22 10:49:28   Procedure changed from MRI Brain Without Contrast     Final result by Rhett Conway MD (04/28/22 10:49:28)                   Impression:      New metal opacities within posterior frontal high convexity scalp, perhaps representing post laceration therapy and clinical correlation requested.    MRI brain otherwise stable and unchanged.  No new stroke hemorrhage or subdural fluid.    Numerous areas of supratentorial gliosis.      Electronically signed by: Rhett Conway MD  Date:    04/28/2022  Time:    10:49               Narrative:    EXAMINATION:  MRI BRAIN W WO CONTRAST    CLINICAL HISTORY:  right sided weakness;    TECHNIQUE:  Multiplanar multisequence MR imaging of the brain was performed before and after the administration of 8 mL Gadavist intravenous contrast.    COMPARISON:  CTA head and neck, CT head same day and MRI CT brain studies dating back 2021 and CT soft tissue neck 2016    FINDINGS:  New metal scalp artifacts posterior frontal high convexity appearing since CT same day 03:50, could represent post laceration therapy and clinical correlation requested.    Central and cortical atrophy remains prominent particularly left posterior frontal lobe cortex.    Numerous scattered foci deep subcortical white matter gliosis particularly frontal and lesser degree parietooccipital more prominent on right, involvement of upper basal ganglia central semi ovale, subinsular cortex again more prominent on right.  Small foci increased signals cerebral peduncle and possibly vague area of linea  are subtle gliosis beneath floor 4th ventricle.  Diffusion and gradient echo studies negative.    No new intra-axial or extra-axial enhancement of brain, hemorrhage, subdural fluid, mass or acute stroke.    Nasal septal deviation spur projects left with some mucosal hypertrophy of ethmoid sinuses.  Distal vertebrobasilar distal internal carotid arteries and major branches patent flow void.                                        CTA Head and Neck (xpd) (Final result)  Result time 04/28/22 05:38:06      Final result by Rigoberto De Jesus MD (04/28/22 05:38:06)                   Impression:      The major intracranial and extracranial arterial vascular structures of the head and neck demonstrate no evidence for high-grade stenosis or occlusion, or dissection, and there is no evidence for intracranial aneurysm or AVM.    There is a finding of for a to the cervical spine extending from C3-C6 that may be artifact however the appearance is most concerning for the possibility of an intraspinal epidural process to include the possibility of epidural fluid collection, abscess or hematoma, clinical and historical correlation is needed, MRI examination of the cervical spine with contrast is recommended.    This report was flagged in Epic as abnormal.    The findings were reported to Dr Joyce in the ER at the time of dictation.      Electronically signed by: Rigoberto De Jessu  Date:    04/28/2022  Time:    05:38               Narrative:    EXAMINATION:  CTA HEAD AND NECK (XPD)    CLINICAL HISTORY:  Neuro deficit, acute, stroke suspected;    TECHNIQUE:  Non contrast low dose axial images were obtained through the head.  CT angiogram was performed from the level of the nae to the top of the head following the IV administration of 100mL of Omnipaque 350.   Sagittal and coronal reconstructions and maximum intensity projection reconstructions were performed. Arterial stenosis percentages are based on NASCET measurement  criteria.    COMPARISON:  CT examination of the brain without contrast April 28, 2022, CT examination of the chest without contrast April 28, 2022    FINDINGS:  The vertebral arteries bilaterally demonstrate appropriate opacification extending to the level of the basilar artery which also demonstrates appropriate opacification.  The P1 segment of the posterior cerebral artery bilaterally appears hypoplastic, and there appear to be bilateral posterior communicating arteries, otherwise the PCA bilaterally demonstrate appropriate opacification.    The common, external and internal carotid arteries bilaterally demonstrate appropriate opacification, the internal carotid arteries demonstrate opacification to the level of the [id number].  There is no evidence for high-grade stenosis or occlusion or dissection of the internal carotid arteries.  The anterior cerebral artery and middle cerebral artery bilaterally demonstrate appropriate opacification.  There is suggestion of a thin small anterior communicating artery.    There is no evidence for intracranial aneurysm or AVM.  The intracranial venous structures appear appropriate.    The visualized orbits appear appropriate.  The paranasal sinuses and mastoid air cells appear appropriate.  The parapharyngeal space bilaterally appears clear, there is no evidence for retropharyngeal abscess.  There is no abnormal thickening of the epiglottis.  The parotid, submandibular and thyroid glands appear appropriate.  The visualized osseous structures demonstrate chronic change.  Findings referable to the lung apices are discussed on the CT examination of the chest of the same date, referral to the chest CT report is recommended.    As best seen on axial series 3 and axial series 2 along the posterior aspect of the spinal canal, and appearing to extend to the right there is subtle suggestion of accentuated attenuation, along the cervical spinal canal extending from approximately C3  to C6.  This is also suggested on sagittal series 602.  This may be artifactual however an intraspinal epidural process to include epidural fluid collection, including the possibility of intraspinal epidural hematoma or abscess cannot be excluded based upon the appearance.  MRI examination of the cervical spine with contrast is recommended for further evaluation.                                        CTA Chest Abdomen Pelvis (Final result)  Result time 04/28/22 05:38:52   Procedure changed from CTA Chest Abdomen Non Coronary     Final result by Rigoberto De Jesus MD (04/28/22 05:38:52)                   Impression:      The thoracic and abdominal aorta demonstrate no evidence for aneurysmal dilatation, aortic dissection or acute aortic injury.    Intrathoracic findings appears stable when compared to the CT examination of the chest of the same date, referral to the chest CT of the same date report is recommended.    Diverticula of the colon are noted in there is a segment of the sigmoid colon demonstrating mild wall thickening, this may relate to concentric muscular hypertrophy of diverticulosis however correlation for mild diverticulitis is needed, if the patient has not had recent colon screening exam I would recommend follow-up colonoscopy or fluoroscopic barium examination to exclude neoplasm.    Irregular contour of the liver again noted, correlation for chronic hepatic disease is needed.    Cholelithiasis is noted, there is no evidence for pericholecystic inflammatory change.    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    This report was flagged in Epic as abnormal.    The findings were reported to Dr Joyce in the ER at the time of dictation.      Electronically signed by: Rigoberto De Jesus  Date:    04/28/2022  Time:    05:38               Narrative:    EXAMINATION:  CTA CHEST ABDOMEN PELVIS    CLINICAL HISTORY:  Aortic dissection suspected;    TECHNIQUE:  CT examination  of the chest abdomen and pelvis was performed via aortic angiographic protocol after the administration of 100 mL Omnipaque 350 intravenous contrast, timing of imaging optimized for evaluation of the aorta.  Axial imaging, sagittal and coronal reconstruction imaging is submitted.  Axial MIP imaging ting of the chest is submitted.    COMPARISON:  CT examination of the chest without contrast April 28, 2022, CTA examination chest abdomen and pelvis October 15, 2018    FINDINGS:  The thoracic and abdominal aorta demonstrate appropriate opacification, there is no evidence for acute aortic leak, aortic dissection, or aneurysmal dilatation and no evidence for periaortic hematoma.  The visualized great vessels appear appropriate.  The pulmonary arterial vasculature appear appropriate.  The celiac artery, hepatic artery, and splenic artery demonstrate appropriate opacification as does the superior mesenteric artery which demonstrates mild narrowing at its origin without high-grade stenosis.  The renal arteries, inferior mesenteric arteries and iliac arterial vasculature appear appropriate.    Intrathoracic findings including cavitary lesion and abnormal areas of multifocal opacity and nodularity of the right lung appear stable when compared to the prior examination of the same date, the left hemithorax appears stable as well, referral to the earlier chest CT report is recommended.  Mediastinal adenopathy is again noted.  There is no pericardial effusion, there is no hemopericardium, there is no hemothorax.  There is no pneumothorax.    Diminished attenuation of the liver consistent with diffuse fatty infiltrate is noted, irregular contour of the liver again noted, correlation for chronic hepatic disease is needed.  Cholelithiasis is noted, there is no evidence for pericholecystic or peripancreatic inflammatory change.  The stomach appears unremarkable for degree of distention.  There is no evidence for acute process of the  spleen or adrenal glands.  There is no hydronephrosis or obstructive uropathy bilaterally.  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  The urinary bladder appears unremarkable for degree of distention.    There is no evidence for small bowel obstructive process.  The appendix is identified, it does not appear inflamed.  Mild prominence of the colon with stool is noted.  Diverticula of the colon are noted.  There is a segment of the sigmoid colon as best seen on axial series 2, images 770 through 809 that demonstrates mild wall thickening, this may relate to concentric muscular hypertrophy of diverticulosis, as significant pericolonic inflammatory change is not appreciated however correlation for signs or symptoms of mild colitis or diverticulitis is needed, if the patient has not had recent colon screening exam I would recommend follow-up colonoscopy or fluoroscopic barium exam to exclude the possibility of neoplasm.  There is no additional evidence for colonic inflammatory or obstructive change.  There is no free intraperitoneal air.  The visualized osseous structures demonstrate chronic change.                                       CT Chest Without Contrast (Final result)  Result time 04/28/22 04:26:44      Final result by Rigoberto De Jesus MD (04/28/22 04:26:44)                   Impression:      Previously identified right upper lobe cavitary lung lesion again noted, diminishing size, in addition there are multifocal areas of pulmonary opacity, areas of consolidation, airspace disease, and nodularity, involving the right hemithorax, overall demonstrating mild improvement.    Chronic lung changes bilaterally are again noted, and the left hemithorax appears stable.    Mediastinal adenopathy appears stable.    Findings referable to the liver for which correlation for chronic hepatic disease is needed again noted.    Cholelithiasis, there is no  evidence for pericholecystic inflammatory change.    Mild pericardial thickening or fluid anteriorly, without large pericardial effusion.      Electronically signed by: Rigoberto De Jesus  Date:    04/28/2022  Time:    04:26               Narrative:    EXAMINATION:  CT CHEST WITHOUT CONTRAST    CLINICAL HISTORY:  back pain, neurologic deficits;    TECHNIQUE:  Low dose axial images, sagittal and coronal reformations were obtained from the thoracic inlet to the lung bases. Contrast was not administered.  The lack of intravenous contrast diminishes the sensitivity of the examination.    COMPARISON:  CT examination of the chest without contrast November 2, 2021    FINDINGS:  The previously identified cavitary lesion of the right upper lobe is again noted, it appears diminished in size when compared to the prior examination, when measured at a similar level to the prior study it measures approximately 1.7 by 3.4 cm on axial imaging compared to 3.3 x 4.4 cm on the prior exam.  There is appearance consistent with bronchial communication to the cavitary lesion, appearing similar to the prior exam.  This is somewhat more conspicuous at this time.  There is continued appearance of surrounding patchy and confluent and nodular opacities with multifocal airspace opacities and of the right lung, the overall pattern and distribution is similar to the prior study, however the overall appearance is that of improvement.    Chronic appearing lung changes are again noted.  There are mild atelectatic changes noted as well.  The left hemithorax appears stable.  There is no evidence for significant pleural effusion and there is no evidence for pneumothorax.    Mediastinal adenopathy is again noted appearing stable.  There is mild greater thickening or fluid along the pericardium anteriorly.  Otherwise the appearance of the heart and great vessels is stable, evaluation is limited on this noncontrast exam.    Limited imaging of the upper  abdomen demonstrates evidence for cholelithiasis without evidence for pericholecystic inflammatory change.  Irregular contour of the liver again noted, correlation for chronic hepatic disease is needed.  The visualized osseous structures appear intact.  Chronic changes are noted.                                       CT Head Without Contrast (Final result)  Result time 04/28/22 04:07:27      Final result by Rigoberto De Jesus MD (04/28/22 04:07:27)                   Impression:      Chronic changes are noted, there is no evidence for superimposed acute intracranial process.      Electronically signed by: Rigoberto De Jesus  Date:    04/28/2022  Time:    04:07               Narrative:    EXAMINATION:  CT HEAD WITHOUT CONTRAST    CLINICAL HISTORY:  Neuro deficit, acute, stroke suspected;    TECHNIQUE:  Low dose axial images were obtained through the head.  Coronal and sagittal reformations were also performed. Contrast was not administered.    COMPARISON:  CT examination of the brain without contrast February 2, 2021    FINDINGS:  The ventricular system, sulcal pattern and parenchymal attenuation characteristics are consistent with chronic change.  Involutional changes noted, chronic appearing white matter change noted.  There is no evidence for superimposed acute process.  There is no evidence for intracranial mass, mass effect or midline shift, there is no evidence for acute intracranial hemorrhage.  Appropriate CSF spaces are seen at the skull base.    The visualized orbits appear intact.  The mastoid air cells and middle ear cavity bilaterally appear appropriate, as do the paranasal sinuses.  The visualized osseous structures appear intact.                                        Physical Exam

## 2022-04-30 NOTE — ASSESSMENT & PLAN NOTE
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

## 2022-04-30 NOTE — HOSPITAL COURSE
4/30 pt s/p C3-6 PCF and epidural hematoma evac on 4/28. Naeon, continued delirium FU MRI C spine today for post op eval  5/1 naeon, mental status improving, moving all to command well. Postop MRI satisfactory decompression. Continue MAP through today, DC both drains.  5/2: NAEON. AFVSS. Subjective strength improvement. cvEEG negative for seizure.   5/3: Patient in restraints for agitation overnight. Calm and oriented x 3 this AM. Strength improving. No Bmx1 week, enema ordered. Leukocytosis, infectious work up pending. Medicine consulted, appreciate their assistance. Worried about polypharmacy contributing to patient's mental status. Robaxin and Lyrica discontinued, scheduled tylenol and lidocaine patches ordered.   5/4: Patient with improvement in mental status. Family at bedside endorses improvement. Patient with BM s/p enema. Medicine following. BLE US, CXR, and EKG neg for acute processes.   5/5: Floyd replaced due to significant retention of ~2 L urine. Urology following. Patient neurologically stable. Aox3. Full strength. Tele sitter discontinued. Patient without restraints overnight. Final path on hematoma as blood clot - daptomycin discontinued. Blood pressure stable after fluids. Zosyn started for empiric coverage for gram neg and anaerobes. Appreciate assistance from multiple teams. Pending rehab.   5/6: AFVSS. Neuro stable. Respiratory cultures to be collected. Continue Zosyn. Pending rehab. Requires physician surveillance in setting of recent spinal surgery and urinary retention.   5/7: Agitation and confusion this AM. Restraints replaced as patient combative and struck sitter. Will wean restraints and encourage redirection from sitter and wife at bedside. Start nightly Seroquel. Strength stable.   5/8: Continue agitation and confusion continue nightly Seroquel. Pending IPR   5/9: Agitation and confusion refractory to depakote. PRN Seroquel added. Strength stable. F/u agitation/delerium workup.  Telesitter at bedside.   5/10: NAEON. Patient awake and alert this AM, family reports good sleep overnight and note improved cognition. Motor exam stable. Incision with staples intact. Denied for IPR, pending SNF  5/11: Patient attempting to climb out of bed early this AM, redirectable. Weaning steroids, end date 5/13. Motor exam stable. Pending SNF  5/12: NAEON. Patient rested well overnight, AAOx2. Neuro exam stable. Na 144. Pending SNF

## 2022-04-30 NOTE — PROGRESS NOTES
[facility] - Neuro Critical Care  Neurosurgery  Progress Note    Subjective:     History of Present Illness: Patient is a 80 y/o male with antiphospholipid syndrome on chronic coumadin, MAC pneumonia, HLD, and left PE diagnosed January 2021 who was transferred from  for compressive epidural hematoma in the cervical spine. Patient reports that he woke up in the middle of the night and could no move the right side of his body. He went to  ED and stroke code initiated. Patient was not given tPA but given ASA. CTA was negative for acute CVA but did show compressive fluid collective from C3-C6. MRI cervical spine confirmed. Patient was transferred to [facility] ED for neurosurgical evaluation. He reports he cannot move his right arm out of the contracted position, he also cannot lift his right leg off the bed. He reports the right arm and leg are painful. No complaints on left side of body. Denies b/b dysfunction.       Post-Op Info:  Procedure(s) (LRB):  LAMINECTOMY, SPINE, CERVICAL, POSTERIOR APPROACH C3-6 (Bilateral)   2 Days Post-Op     Interval History: 4/30 pt s/p C3-6 PCF and epidural hematoma evac on 4/28. Naeon, continued delirium FU MRI C spine today for post op eval      Review of patient's allergies indicates:  No Known Allergies    Past Medical History:   Diagnosis Date    Antiphospholipid syndrome 11/19/2021    Dysphagia     Hx of colonic polyps     followed by GI. Dr. Pham    Hyperlipidemia LDL goal <100     Overweight(278.02)     Prostate cancer september 2011    followed by urology, Dr. Rogers    Type II or unspecified type diabetes mellitus without mention of complication, not stated as uncontrolled     diet controlled     Past Surgical History:   Procedure Laterality Date    BRONCHOSCOPY N/A 10/15/2018    Procedure: BRONCHOSCOPY;  [facility];  Location: [facility] OR [address] [place] [address];  Service: Anesthesiology;  Laterality: N/A;    CATARACT EXTRACTION, BILATERAL  2014    POSTERIOR  CERVICAL LAMINECTOMY Bilateral 2022    Procedure: LAMINECTOMY, SPINE, CERVICAL, POSTERIOR APPROACH C3-6;  Surgeon: Carlos Miller MD;  Location: [facility] OR [address] [place] [address];  Service: Neurosurgery;  Laterality: Bilateral;    PROSTATECTOMY       Family History       Problem Relation (Age of Onset)    Colon cancer Mother    Dementia Brother    Diabetes Mother, Sister, , Brother, Brother, Maternal Aunt    Heart disease Father    Lung cancer Brother    Mental illness Sister    Myasthenia gravis Brother    Other Brother    Parkinsonism Brother    Pulmonary fibrosis Brother          Tobacco Use    Smoking status: Former Smoker     Packs/day: 0.25     Years: 5.00     Pack years: 1.25     Quit date: 10/2/1962     Years since quittin.6    Smokeless tobacco: Never Used    Tobacco comment: quit age 23   Substance and Sexual Activity    Alcohol use: Yes     Comment: beer occasionally    Drug use: No    Sexual activity: Yes     Partners: Female     Review of Systems   Constitutional:  Negative for chills, fatigue and fever.   Eyes:  Negative for photophobia and visual disturbance.   Respiratory:  Negative for cough and shortness of breath.    Cardiovascular:  Negative for chest pain, palpitations and leg swelling.   Gastrointestinal:  Negative for constipation, diarrhea, nausea and vomiting.   Genitourinary:  Negative for difficulty urinating, dysuria, frequency and urgency.   Musculoskeletal:  Positive for myalgias. Negative for back pain, gait problem and neck pain.   Neurological:  Positive for weakness (right upper and lower extremities). Negative for dizziness, seizures, speech difficulty, numbness and headaches.   Psychiatric/Behavioral:  Negative for confusion. The patient is not nervous/anxious.      Objective:     Weight: 74.8 kg (165 lb)  Body mass index is 24.37 kg/m².  Vital Signs (Most Recent):  Temp: 97.8 °F (36.6 °C) (22 1105)  Pulse: (!) 45 (22 1305)  Resp: 14 (22 1305)  BP: 124/60 (22  1305)  SpO2: 98 % (04/30/22 1305)   Vital Signs (24h Range):  Temp:  [97.8 °F (36.6 °C)-98.8 °F (37.1 °C)] 97.8 °F (36.6 °C)  Pulse:  [] 45  Resp:  [12-34] 14  SpO2:  [93 %-98 %] 98 %  BP: (110-156)/(59-90) 124/60  Arterial Line BP: (123-156)/(53-85) 125/54     Date 04/30/22 0700 - 05/01/22 0659   Shift 7270-2867 9549-5025 5240-1484 24 Hour Total   INTAKE   I.V.(mL/kg) 837.9(11.2)   837.9(11.2)   IV Piggyback 1000.4   1000.4   Shift Total(mL/kg) 1838.3(24.6)   1838.3(24.6)   OUTPUT   Urine(mL/kg/hr) 975   975   Shift Total(mL/kg) 975(13)   975(13)   Weight (kg) 74.8 74.8 74.8 74.8           Neurosurgery Physical Exam  [id number]  Baseline dementia confounded by delirium  Awake, NAD, confused, disoriented  Moving BUE antigravity with baseline RUE contracture  WD to pain in BLE, spont movement in both L>R  Unable to fully assess motor due to confusion, poor cooperation        Significant Labs:  Recent Labs   Lab 04/28/22  2104 04/29/22  0225 04/29/22  0434 04/30/22  0327 04/30/22  0556 04/30/22  1246   * 172*  --  141*  --   --     150*  149*   < > 148* 147* 144   K 4.2 3.9  --  4.0  --   --     113*  --  114*  --   --    CO2 28 26  --  24  --   --    BUN 8 6*  --  7*  --   --    CREATININE 0.8 0.8  --  0.7  --   --    CALCIUM 8.4* 8.6*  --  8.1*  --   --    MG  --  2.2  --  2.0  --   --     < > = values in this interval not displayed.       Recent Labs   Lab 04/29/22 0225 04/30/22  0327 04/30/22  1246   WBC 11.09 21.89* 22.15*   HGB 11.6* 10.8* 9.9*   HCT 36.2* 34.6* 31.0*    240 234       Recent Labs   Lab 04/29/22 0225 04/29/22  0549 04/30/22  0327   INR 1.3*  --  1.7*   APTT 33.0* 33.1* 41.3*       Microbiology Results (last 7 days)       Procedure Component Value Units Date/Time    Blood culture [[id number]] Collected: 04/28/22 1201    Order Status: Completed Specimen: Blood from Peripheral, Forearm, Right Updated: 04/30/22 1303     Blood Culture, Routine No Growth to date      No  Growth to date      No Growth to date    Blood culture [[id number]] Collected: 04/28/22 1201    Order Status: Completed Specimen: Blood from Peripheral, Hand, Left Updated: 04/30/22 1303     Blood Culture, Routine No Growth to date      No Growth to date      No Growth to date          All pertinent labs from the last 24 hours have been reviewed.    Significant Diagnostics:    Imaging Results              SURG FL Surgery Fluoro Usage (Final result)  Result time 04/28/22 19:52:20      Final result by Access, Silent  (04/28/22 19:52:20)                   Narrative:    See OP Notes for results.     IMPRESSION: See OP Notes for results.             This procedure was auto-finalized by: Virtual Radiologist                                      MRI Cervical Spine W WO Cont (Final result)  Result time 04/28/22 11:30:13   Procedure changed from MRI Cervical Spine With Contrast     Final result by Rhett Conway MD (04/28/22 11:30:13)                   Impression:      1. Abnormal nonspecific posterior epidural fluid, differential diagnosis includes hematoma and abscess.  This in conjunction with juxta-articular posterior soft tissue edema particularly on left and inter spinous process region, superimposed muscle strain posttraumatic change favored in may be responsible for the posterior epidural fluid which could represent hematoma.  No osteomyelitis or septic facet joint disease or discitis or acute fracture noted on CTA neck exam.  2. Prominent degenerative disc spondylosis facet joint arthropathy changes particularly C3 through C6 levels.  3.  This report was flagged in Epic as abnormal.  4. Neuro surgical consultation suggested as well as clinical correlation.    COMMUNICATION  This critical result was discovered/received at 11:15 04/28/2022.  The critical information above was relayed directly by me by telephone to Dr Bay on 04/28/2022 at 1229 hours.      Electronically signed by: Rhett Conway  MD  Date:    04/28/2022  Time:    11:30               Narrative:    EXAMINATION:  MRI CERVICAL SPINE W WO CONTRAST    CLINICAL HISTORY:  cervical spine extending from C3-C6 that may be artifact however the appearance is most concerning for the possibility of an intraspinal epidural process to include the possibility of epidural fluid collection, abscess or hematoma;    TECHNIQUE:  MRI cervical spine without and with intravenous Gadavist 8 cc.    COMPARISON:  CT and MRI imaging of brain, cervical spine dating back 2014.    FINDINGS:  Levoscoliosis cervicothoracic junction with tilting head right.  DJD chronic change anterior C1-C2 with mild hypertrophy transverse ligament and mild indentation adjacent cranial cervical junction.  Marrow space normal.  Degenerative disc spondylosis particularly to C4 through C6.  No fracture subluxation.    Posterior juxta-articular edema noted about left facet joints from C2 through C7 levels as well as between posterior spinous processes, limited similar juxta-articular edema right posterior lateral facet joint structures.  Abnormality right upper lobe suprahilar region, please refer to CTA chest same day report.    Posterior epidural fluid noted to extend from C2-C3-T1 level with isointense signal spinal cord on T1, heterogeneous increased signal on T2 and FLAIR and limited enhancement of margins on post contrasted exam.  Study obscured by motion artifact particularly postcontrast exam.  Overall exam is of satisfactory diagnostic quality.    C2-C3 posterior disc normal, facet joint arthropathy with mild moderate right foramen and no left foramen and mild spinal stenosis.    C3-C4 mild moderate posterior central disc desiccated bulge, mild compression anterior spinal cord, moderate soft tissue spinal stenosis in conjunction with posterior epidural compression of spinal cord mild moderate degree.    C4-C5 mild moderate posterior disc bulge spondylosis, mild compression anterior spinal  cord, severe compression posterior spinal cord by epidural fluid particularly on right, facet joint arthropathy with mild moderate foraminal and moderate severe soft tissues spinal stenosis.    C5-C6 mild moderate posterior disc bulge spondylosis, paracentral prominent left, mild moderate compression anterior spinal cord, moderate compression posterior spinal cord by epidural fluid, facet joint arthropathy with mild moderate foramen stenosis particularly on left, moderate soft tissue spinal stenosis.    C6-C7 mild moderate posterior disc bulge spondylosis, posterior central annular fissure, moderate compression anterior spinal cord, additional mild moderate compression posterior spinal cord by a epidural fluid, facet joint arthropathy, mild moderate foraminal stenosis more prominent on left, moderate soft tissue spinal stenosis.    C7-T1 posterior disc margin normal.  Some hypertrophy posterior spinal ligament, mild moderate compression posterior spinal sac, mild moderate soft tissue spinal stenosis.    Posterior spinal fluid collection extends to level of T1-T2 with mild moderate compression posterior spinal sac and mild moderate spinal stenosis.  Upper dorsal spinal cord and spinal canal otherwise appears intact.    No additional abnormal intra-axial extra-axial enhancement post-contrast exam.  No definite abnormal signal within spinal cord.                                       MRI Brain W WO Contrast (Final result)  Result time 04/28/22 10:49:28   Procedure changed from MRI Brain Without Contrast     Final result by Rhett Conway MD (04/28/22 10:49:28)                   Impression:      New metal opacities within posterior frontal high convexity scalp, perhaps representing post laceration therapy and clinical correlation requested.    MRI brain otherwise stable and unchanged.  No new stroke hemorrhage or subdural fluid.    Numerous areas of supratentorial gliosis.      Electronically signed  by: Rhett Conway MD  Date:    04/28/2022  Time:    10:49               Narrative:    EXAMINATION:  MRI BRAIN W WO CONTRAST    CLINICAL HISTORY:  right sided weakness;    TECHNIQUE:  Multiplanar multisequence MR imaging of the brain was performed before and after the administration of 8 mL Gadavist intravenous contrast.    COMPARISON:  CTA head and neck, CT head same day and MRI CT brain studies dating back 2021 and CT soft tissue neck 2016    FINDINGS:  New metal scalp artifacts posterior frontal high convexity appearing since CT same day 03:50, could represent post laceration therapy and clinical correlation requested.    Central and cortical atrophy remains prominent particularly left posterior frontal lobe cortex.    Numerous scattered foci deep subcortical white matter gliosis particularly frontal and lesser degree parietooccipital more prominent on right, involvement of upper basal ganglia central semi ovale, subinsular cortex again more prominent on right.  Small foci increased signals cerebral peduncle and possibly vague area of linea are subtle gliosis beneath floor 4th ventricle.  Diffusion and gradient echo studies negative.    No new intra-axial or extra-axial enhancement of brain, hemorrhage, subdural fluid, mass or acute stroke.    Nasal septal deviation spur projects left with some mucosal hypertrophy of ethmoid sinuses.  Distal vertebrobasilar distal internal carotid arteries and major branches patent flow void.                                        CTA Head and Neck (xpd) (Final result)  Result time 04/28/22 05:38:06      Final result by Rigoberto De Jesus MD (04/28/22 05:38:06)                   Impression:      The major intracranial and extracranial arterial vascular structures of the head and neck demonstrate no evidence for high-grade stenosis or occlusion, or dissection, and there is no evidence for intracranial aneurysm or AVM.    There is a finding of for a to the cervical spine  extending from C3-C6 that may be artifact however the appearance is most concerning for the possibility of an intraspinal epidural process to include the possibility of epidural fluid collection, abscess or hematoma, clinical and historical correlation is needed, MRI examination of the cervical spine with contrast is recommended.    This report was flagged in Epic as abnormal.    The findings were reported to Dr Joyce in the ER at the time of dictation.      Electronically signed by: Rigoberto De Jesus  Date:    04/28/2022  Time:    05:38               Narrative:    EXAMINATION:  CTA HEAD AND NECK (XPD)    CLINICAL HISTORY:  Neuro deficit, acute, stroke suspected;    TECHNIQUE:  Non contrast low dose axial images were obtained through the head.  CT angiogram was performed from the level of the nae to the top of the head following the IV administration of 100mL of Omnipaque 350.   Sagittal and coronal reconstructions and maximum intensity projection reconstructions were performed. Arterial stenosis percentages are based on NASCET measurement criteria.    COMPARISON:  CT examination of the brain without contrast April 28, 2022, CT examination of the chest without contrast April 28, 2022    FINDINGS:  The vertebral arteries bilaterally demonstrate appropriate opacification extending to the level of the basilar artery which also demonstrates appropriate opacification.  The P1 segment of the posterior cerebral artery bilaterally appears hypoplastic, and there appear to be bilateral posterior communicating arteries, otherwise the PCA bilaterally demonstrate appropriate opacification.    The common, external and internal carotid arteries bilaterally demonstrate appropriate opacification, the internal carotid arteries demonstrate opacification to the level of the [id number].  There is no evidence for high-grade stenosis or occlusion or dissection of the internal carotid arteries.  The anterior cerebral artery and  middle cerebral artery bilaterally demonstrate appropriate opacification.  There is suggestion of a thin small anterior communicating artery.    There is no evidence for intracranial aneurysm or AVM.  The intracranial venous structures appear appropriate.    The visualized orbits appear appropriate.  The paranasal sinuses and mastoid air cells appear appropriate.  The parapharyngeal space bilaterally appears clear, there is no evidence for retropharyngeal abscess.  There is no abnormal thickening of the epiglottis.  The parotid, submandibular and thyroid glands appear appropriate.  The visualized osseous structures demonstrate chronic change.  Findings referable to the lung apices are discussed on the CT examination of the chest of the same date, referral to the chest CT report is recommended.    As best seen on axial series 3 and axial series 2 along the posterior aspect of the spinal canal, and appearing to extend to the right there is subtle suggestion of accentuated attenuation, along the cervical spinal canal extending from approximately C3 to C6.  This is also suggested on sagittal series 602.  This may be artifactual however an intraspinal epidural process to include epidural fluid collection, including the possibility of intraspinal epidural hematoma or abscess cannot be excluded based upon the appearance.  MRI examination of the cervical spine with contrast is recommended for further evaluation.                                        CTA Chest Abdomen Pelvis (Final result)  Result time 04/28/22 05:38:52   Procedure changed from CTA Chest Abdomen Non Coronary     Final result by Rigoberto De Jesus MD (04/28/22 05:38:52)                   Impression:      The thoracic and abdominal aorta demonstrate no evidence for aneurysmal dilatation, aortic dissection or acute aortic injury.    Intrathoracic findings appears stable when compared to the CT examination of the chest of the same date, referral to the chest CT  of the same date report is recommended.    Diverticula of the colon are noted in there is a segment of the sigmoid colon demonstrating mild wall thickening, this may relate to concentric muscular hypertrophy of diverticulosis however correlation for mild diverticulitis is needed, if the patient has not had recent colon screening exam I would recommend follow-up colonoscopy or fluoroscopic barium examination to exclude neoplasm.    Irregular contour of the liver again noted, correlation for chronic hepatic disease is needed.    Cholelithiasis is noted, there is no evidence for pericholecystic inflammatory change.    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    This report was flagged in Epic as abnormal.    The findings were reported to Dr Joyce in the ER at the time of dictation.      Electronically signed by: Rigoberto De Jesus  Date:    04/28/2022  Time:    05:38               Narrative:    EXAMINATION:  CTA CHEST ABDOMEN PELVIS    CLINICAL HISTORY:  Aortic dissection suspected;    TECHNIQUE:  CT examination of the chest abdomen and pelvis was performed via aortic angiographic protocol after the administration of 100 mL Omnipaque 350 intravenous contrast, timing of imaging optimized for evaluation of the aorta.  Axial imaging, sagittal and coronal reconstruction imaging is submitted.  Axial MIP imaging ting of the chest is submitted.    COMPARISON:  CT examination of the chest without contrast April 28, 2022, CTA examination chest abdomen and pelvis October 15, 2018    FINDINGS:  The thoracic and abdominal aorta demonstrate appropriate opacification, there is no evidence for acute aortic leak, aortic dissection, or aneurysmal dilatation and no evidence for periaortic hematoma.  The visualized great vessels appear appropriate.  The pulmonary arterial vasculature appear appropriate.  The celiac artery, hepatic artery, and splenic artery demonstrate appropriate opacification as does  the superior mesenteric artery which demonstrates mild narrowing at its origin without high-grade stenosis.  The renal arteries, inferior mesenteric arteries and iliac arterial vasculature appear appropriate.    Intrathoracic findings including cavitary lesion and abnormal areas of multifocal opacity and nodularity of the right lung appear stable when compared to the prior examination of the same date, the left hemithorax appears stable as well, referral to the earlier chest CT report is recommended.  Mediastinal adenopathy is again noted.  There is no pericardial effusion, there is no hemopericardium, there is no hemothorax.  There is no pneumothorax.    Diminished attenuation of the liver consistent with diffuse fatty infiltrate is noted, irregular contour of the liver again noted, correlation for chronic hepatic disease is needed.  Cholelithiasis is noted, there is no evidence for pericholecystic or peripancreatic inflammatory change.  The stomach appears unremarkable for degree of distention.  There is no evidence for acute process of the spleen or adrenal glands.  There is no hydronephrosis or obstructive uropathy bilaterally.  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  The urinary bladder appears unremarkable for degree of distention.    There is no evidence for small bowel obstructive process.  The appendix is identified, it does not appear inflamed.  Mild prominence of the colon with stool is noted.  Diverticula of the colon are noted.  There is a segment of the sigmoid colon as best seen on axial series 2, images 770 through 809 that demonstrates mild wall thickening, this may relate to concentric muscular hypertrophy of diverticulosis, as significant pericolonic inflammatory change is not appreciated however correlation for signs or symptoms of mild colitis or diverticulitis is needed, if the patient has not had recent colon screening exam I  would recommend follow-up colonoscopy or fluoroscopic barium exam to exclude the possibility of neoplasm.  There is no additional evidence for colonic inflammatory or obstructive change.  There is no free intraperitoneal air.  The visualized osseous structures demonstrate chronic change.                                       CT Chest Without Contrast (Final result)  Result time 04/28/22 04:26:44      Final result by Rigoberto De Jesus MD (04/28/22 04:26:44)                   Impression:      Previously identified right upper lobe cavitary lung lesion again noted, diminishing size, in addition there are multifocal areas of pulmonary opacity, areas of consolidation, airspace disease, and nodularity, involving the right hemithorax, overall demonstrating mild improvement.    Chronic lung changes bilaterally are again noted, and the left hemithorax appears stable.    Mediastinal adenopathy appears stable.    Findings referable to the liver for which correlation for chronic hepatic disease is needed again noted.    Cholelithiasis, there is no evidence for pericholecystic inflammatory change.    Mild pericardial thickening or fluid anteriorly, without large pericardial effusion.      Electronically signed by: Rigoberto De Jesus  Date:    04/28/2022  Time:    04:26               Narrative:    EXAMINATION:  CT CHEST WITHOUT CONTRAST    CLINICAL HISTORY:  back pain, neurologic deficits;    TECHNIQUE:  Low dose axial images, sagittal and coronal reformations were obtained from the thoracic inlet to the lung bases. Contrast was not administered.  The lack of intravenous contrast diminishes the sensitivity of the examination.    COMPARISON:  CT examination of the chest without contrast November 2, 2021    FINDINGS:  The previously identified cavitary lesion of the right upper lobe is again noted, it appears diminished in size when compared to the prior examination, when measured at a similar level to the prior study it measures  approximately 1.7 by 3.4 cm on axial imaging compared to 3.3 x 4.4 cm on the prior exam.  There is appearance consistent with bronchial communication to the cavitary lesion, appearing similar to the prior exam.  This is somewhat more conspicuous at this time.  There is continued appearance of surrounding patchy and confluent and nodular opacities with multifocal airspace opacities and of the right lung, the overall pattern and distribution is similar to the prior study, however the overall appearance is that of improvement.    Chronic appearing lung changes are again noted.  There are mild atelectatic changes noted as well.  The left hemithorax appears stable.  There is no evidence for significant pleural effusion and there is no evidence for pneumothorax.    Mediastinal adenopathy is again noted appearing stable.  There is mild greater thickening or fluid along the pericardium anteriorly.  Otherwise the appearance of the heart and great vessels is stable, evaluation is limited on this noncontrast exam.    Limited imaging of the upper abdomen demonstrates evidence for cholelithiasis without evidence for pericholecystic inflammatory change.  Irregular contour of the liver again noted, correlation for chronic hepatic disease is needed.  The visualized osseous structures appear intact.  Chronic changes are noted.                                       CT Head Without Contrast (Final result)  Result time 04/28/22 04:07:27      Final result by Rigoberto De Jesus MD (04/28/22 04:07:27)                   Impression:      Chronic changes are noted, there is no evidence for superimposed acute intracranial process.      Electronically signed by: Rigoberto De Jesus  Date:    04/28/2022  Time:    04:07               Narrative:    EXAMINATION:  CT HEAD WITHOUT CONTRAST    CLINICAL HISTORY:  Neuro deficit, acute, stroke suspected;    TECHNIQUE:  Low dose axial images were obtained through the head.  Coronal and sagittal reformations  were also performed. Contrast was not administered.    COMPARISON:  CT examination of the brain without contrast February 2, 2021    FINDINGS:  The ventricular system, sulcal pattern and parenchymal attenuation characteristics are consistent with chronic change.  Involutional changes noted, chronic appearing white matter change noted.  There is no evidence for superimposed acute process.  There is no evidence for intracranial mass, mass effect or midline shift, there is no evidence for acute intracranial hemorrhage.  Appropriate CSF spaces are seen at the skull base.    The visualized orbits appear intact.  The mastoid air cells and middle ear cavity bilaterally appear appropriate, as do the paranasal sinuses.  The visualized osseous structures appear intact.                                        Physical Exam    Assessment/Plan:     * Epidural hematoma  82 y/o male with APS on coumadin, hx PE, MAC pneumonia, HLD with spontaneous C3-C6 epidural hematoma compressing the spinal cord    S/p  C3-C6 laminectomy and fusion for evacuation of epidural hematoma 4/28    Plan:  NCC Q1 h neurochecks, delirium precautions  All imaing personally reviewed   -- CTH 4/29 no acute intracranial process   -- Post op MRI C spine pending   --postop XR C spine pending  -- c collar on at all times  -- MAP>80 through weekend  -- Dex 4q6  Na management as medical team   Maintain Drains, abx while in place              North Perry MD  Neurosurgery  [facility]

## 2022-04-30 NOTE — NURSING
0700 - night RN reported that pt pulled on his amaya around 0600 this morning but amaya remained in place     0730 - upon first assessment, amaya cath not draining and visible clot seen in tubing. Amaya flushed without urine return. Amaya removed. Tubing flushed after removal with large blood clot occluding. Pt now with sky blood draining from urethra. Team notified. 20F amaya cath ordered     1000 - Pt still having continuous blood draining from urethra. 20F amaya cath placed and irrigated with tea colored urine return. Pt still having slow blood drainage from urethra around amaya. Team is aware     0500 - pt still having bloody drainage from urethra around amaya. Urine is now clear yellow. Team is aware

## 2022-04-30 NOTE — PROGRESS NOTES
[facility]  Neurocritical Care  Progress Note    Admit Date: 4/28/2022  Service Date: 04/30/2022  Length of Stay: 2    Subjective:     Chief Complaint: Epidural hematoma    History of Present Illness: 82 yo M with PMH antiphospholipid syndrome on chronic coumadin for left PE diagnosed Jan 2021, DM2, prostate ca s/p prostatectomy, MAC pneumonia dx Fall 2018, mild cognitive impairment, and HLD who was transferred from OSH for compressive epidural hematoma in the cervical spine. Patient reports that he woke up in the middle of the night and could not move the right side of his body. He went to  ED and stroke code was initiated. Patient was not given tPA but given ASA. CTA was negative for acute CVA but did show compressive fluid collective from C3-C6. MRI cervical spine confirmed. Patient was transferred to [facility] ED for neurosurgical evaluation. He reported to NSGY he could not move his right arm out of the contracted position, and he also could not lift his right leg off the bed. He reported his right arm and leg were in pain. No complaints on left side of body. Denied b/b dysfunction. However, when he was taken to the OR and amaya was placed, he put out >1L urine. Pt admitted to [facility] for monitoring and management s/p C3-C6 decompression of epidural hematoma and posterior fusion.      Hospital Course: 04/29/2022C: CT head today per NSGY team, DDAVP once, ID consult  04/30/2022 levophed for map goals ; continue ID abx f/u cx; DDAVP 0600 for high uop; 0.45NS; traumatic amaya removal by pt w/ bleeding from urethra: new amaya inserted without clots in bladder; MRI cspine today     addendum: MAP goals         Review of Systems    Constitutional: Denies fevers, weight loss, chills, or weakness.  Eyes: Denies changes in vision.  ENT: Denies dysphagia, nasal discharge, ear pain or discharge.  Cardiovascular: Denies chest pain, palpitations, orthopnea, or claudication.  Respiratory: Denies  shortness of breath, cough, hemoptysis, or wheezing.  GI: Denies nausea/vomitting, hematochezia, melena, abd pain, or changes in appetite.  : Denies dysuria, incontinence, or hematuria.  Musculoskeletal: Denies joint pain or myalgias.  Skin/breast: Denies rashes, lumps, lesions, or discharge.  Neurologic: Denies headache, dizziness, vertigo, or paresthesias.  Psychiatric: Denies changes in mood or hallucinations.  Endocrine: Denies polyuria, polydipsia, heat/cold intolerance.  Hematologic/Lymph: Denies lymphadenopathy, easy bruising or easy bleeding.  Allergic/Immunologic: Denies rash, rhinitis.    Objective:     Vitals:  Temp: 97.9 °F (36.6 °C)  Pulse: 64  Rhythm: normal sinus rhythm  BP: 125/68  MAP (mmHg): 91  Resp: 14  SpO2: 97 %  O2 Device (Oxygen Therapy): nasal cannula w/ humidification    Temp  Min: 97.8 °F (36.6 °C)  Max: 98.8 °F (37.1 °C)  Pulse  Min: 55  Max: 105  BP  Min: 110/60  Max: 156/90  MAP (mmHg)  Min: 80  Max: 118  Resp  Min: 12  Max: 34  SpO2  Min: 93 %  Max: 97 %    04/29 0701 - 04/30 0700  In: 4313.8 [I.V.:2342.1]  Out: 5670 [Urine:5500; Drains:170]   Unmeasured Output  Stool Occurrence: 0       Physical Exam  GA: Alert, comfortable, no acute distress.   HEENT: No scleral icterus or JVD.   Pulmonary: Clear to auscultation A/L.   Cardiac: RRR S1 & S2 w/o rubs/murmurs/gallops.   Abdominal: Bowel sounds present x 4. No appreciable hepatosplenomegaly.  Skin: No jaundice, rashes, or visible lesions.  Blood clots at urethral meatus   Neuro:  --GCS: E4 V4 M6  --Mental Status:  opens eyes, delayed responses, tracks, oriented to self only  --CN II-XII grossly intact.   --Pupils 3mm, PERRL.   --Corneal reflex, gag, cough intact.  --LIU spont, BLE unable hold against gravity, RUE weak    Medications:  Continuoussodium chloride 0.45%, Last Rate: 125 mL/hr at 04/30/22 0602  dexmedetomidine (PRECEDEX) infusion, Last Rate: 0.3 mcg/kg/hr (04/30/22 0602)  NORepinephrine bitartrate-D5W, Last Rate: 0.02  mcg/kg/min (04/30/22 0839)  Scheduledacetaminophen, 1,000 mg, Q8H  albuterol-ipratropium, 3 mL, Q6H  azithromycin, 500 mg, Daily  DAPTOmycin (CUBICIN)  IV, 8 mg/kg, Q24H  dexamethasone, 4 mg, Q6H  ethambutoL, 800 mg, Daily  famotidine, 20 mg, BID  heparin (porcine), 5,000 Units, Q8H  lactated ringers, 500 mL, Once  methocarbamoL, 500 mg, Q6H  mucus clearing device, , BID  mupirocin, , BID  nebulizer and compressor, 1 Device, BID  polyethylene glycol, 17 g, Daily  pravastatin, 10 mg, Daily  pregabalin, 75 mg, TID  QUEtiapine, 25 mg, QHS  senna-docusate 8.6-50 mg, 1 tablet, BID  silodosin, 4 mg, Daily  sodium chloride 3%, 4 mL, BID  PRNacetaminophen, 650 mg, Q6H PRN  calcium gluconate IVPB, 1 g, PRN  calcium gluconate IVPB, 2 g, PRN  calcium gluconate IVPB, 3 g, PRN  dextrose 10%, 12.5 g, PRN  dextrose 10%, 25 g, PRN  fentaNYL, 25 mcg, Q2H PRN  glucagon (human recombinant), 1 mg, PRN  HYDROmorphone, 1 mg, Q4H PRN  insulin aspart U-100, 0-10 Units, Q6H PRN  magnesium sulfate IVPB, 2 g, PRN  magnesium sulfate IVPB, 4 g, PRN  melatonin, 6 mg, Nightly PRN  metoclopramide HCl, 5 mg, Q6H PRN  ondansetron, 4 mg, Q8H PRN  potassium chloride, 40 mEq, PRN   And  potassium chloride, 60 mEq, PRN   And  potassium chloride, 80 mEq, PRN  sodium chloride 0.9%, 10 mL, PRN  sodium chloride 0.9%, 10 mL, PRN    Today I personally reviewed pertinent medications, lines/drains/airways, imaging, cardiology results, laboratory results, microbiology results, MRI pending    Diet  Diet Dysphagia Pureed (IDDSI Level 4) Nectar Thick        Assessment/Plan:     Neuro  * Epidural hematoma  s/p C3-C6 decompression of epidural hematoma and posterior fusion    -NSGY following  -dex 4 q6h + H2B  -MAP goal >85, maintaining on levophed  - q1h neuro and VS checks  -PT/OT/SLP as appropriate  -c collar in place  -drains per NSGY  -post op XR pending  -hold AC/AP in acute post op setting    4/30/2022: pending MRI per NSGY team   Continue MAP >85    Acute  ischemic left MCA stroke  Neg on CTA  Given ASA at OSH before epidural hematoma found as cause of acute hemiplegia    Cognitive communication deficit  Noted in hx    Pulmonary  Cavitary lung disease  See MAC, cont ABx    Cardiac/Vascular  Hyperlipidemia associated with type 2 diabetes mellitus  Cont statin    Renal/  Urinary retention  Kept amaya in post op d/t IVF running for MAP goals and urinary retention noted in OR  -silodosin     4/30 traumatic amaya removal by confused pt; new amaya inserted; no clots during amaya flushes; blood at urethral meatus     ID  HILLARY (mycobacterium avium-intracellulare)  Dx Fall 2018\  Cont azithromycin and ethambutol  ID consulted    Hematology  Antiphospholipid syndrome  Hold AC/AP for now  Follow CBC and coags closely  Hematology consult, appreciate reccs    Hypercoagulable state  See APA    Oncology  History of prostate cancer  S/p prostatectomy 2011    Endocrine  Prediabetes  A1C 5.7  SSI and accu checks          The patient is being Prophylaxed for:  Venous Thromboembolism with: Mechanical  Stress Ulcer with: None  Ventilator Pneumonia with: not applicable    Activity Orders          Diet Dysphagia Pureed (IDDSI Level 4) Nectar Thick: Dysphagia 1 (Pureed) starting at 04/29 1027    Turn patient starting at 04/28 2200    Elevate HOB starting at 04/28 2049    Progressive Mobility Protocol (mobilize patient to their highest level of functioning at least twice daily) starting at 04/28 0800    Elevate HOB Elevate (< 30 degrees) starting at 04/28 0630    Elevate HOB Elevate (30-45 degrees) starting at 04/28 0629    Straight Cath starting at 04/28 0629        Full Code    Herberth De Leon MD  Neurocritical Care  [facility] - Neuro Critical Care      Time spent with this critically ill patient and care team at the bedside: 35min  -There is high probability for acute neurological and/or systemic change leading to clinical and possibly life-threatening deterioration'

## 2022-04-30 NOTE — ASSESSMENT & PLAN NOTE
s/p C3-C6 decompression of epidural hematoma and posterior fusion    -NSGY following  -dex 4 q6h + H2B  -MAP goal >85, maintaining on levophed  - q1h neuro and VS checks  -PT/OT/SLP as appropriate  -c collar in place  -drains per NSGY  -post op XR pending  -hold AC/AP in acute post op setting    4/30/2022: pending MRI per NSGY team   Continue MAP >85

## 2022-05-01 LAB
ALBUMIN SERPL BCP-MCNC: 3.6 G/DL (ref 3.5–5.2)
ALP SERPL-CCNC: 49 U/L (ref 55–135)
ALT SERPL W/O P-5'-P-CCNC: 19 U/L (ref 10–44)
AMMONIA PLAS-SCNC: 36 UMOL/L (ref 10–50)
ANION GAP SERPL CALC-SCNC: 10 MMOL/L (ref 8–16)
APTT BLDCRRT: 33.3 SEC (ref 21–32)
AST SERPL-CCNC: 41 U/L (ref 10–40)
BASOPHILS # BLD AUTO: 0.01 K/UL (ref 0–0.2)
BASOPHILS NFR BLD: 0.1 % (ref 0–1.9)
BILIRUB SERPL-MCNC: 0.6 MG/DL (ref 0.1–1)
BUN SERPL-MCNC: 9 MG/DL (ref 8–23)
CALCIUM SERPL-MCNC: 8.1 MG/DL (ref 8.7–10.5)
CHLORIDE SERPL-SCNC: 109 MMOL/L (ref 95–110)
CK MB SERPL-MCNC: 6.6 NG/ML (ref 0.1–6.5)
CK MB SERPL-RTO: 1.9 % (ref 0–5)
CK SERPL-CCNC: 350 U/L (ref 20–200)
CO2 SERPL-SCNC: 23 MMOL/L (ref 23–29)
CREAT SERPL-MCNC: 0.7 MG/DL (ref 0.5–1.4)
DIFFERENTIAL METHOD: ABNORMAL
EOSINOPHIL # BLD AUTO: 0 K/UL (ref 0–0.5)
EOSINOPHIL NFR BLD: 0 % (ref 0–8)
ERYTHROCYTE [DISTWIDTH] IN BLOOD BY AUTOMATED COUNT: 15.3 % (ref 11.5–14.5)
EST. GFR  (AFRICAN AMERICAN): >60 ML/MIN/1.73 M^2
EST. GFR  (NON AFRICAN AMERICAN): >60 ML/MIN/1.73 M^2
GLUCOSE SERPL-MCNC: 138 MG/DL (ref 70–110)
HCT VFR BLD AUTO: 29.9 % (ref 40–54)
HGB BLD-MCNC: 9.9 G/DL (ref 14–18)
IMM GRANULOCYTES # BLD AUTO: 0.14 K/UL (ref 0–0.04)
IMM GRANULOCYTES NFR BLD AUTO: 0.9 % (ref 0–0.5)
INR PPP: 1.1 (ref 0.8–1.2)
LYMPHOCYTES # BLD AUTO: 1.3 K/UL (ref 1–4.8)
LYMPHOCYTES NFR BLD: 8 % (ref 18–48)
MAGNESIUM SERPL-MCNC: 2.1 MG/DL (ref 1.6–2.6)
MCH RBC QN AUTO: 27.7 PG (ref 27–31)
MCHC RBC AUTO-ENTMCNC: 33.1 G/DL (ref 32–36)
MCV RBC AUTO: 84 FL (ref 82–98)
MONOCYTES # BLD AUTO: 1 K/UL (ref 0.3–1)
MONOCYTES NFR BLD: 6.3 % (ref 4–15)
NEUTROPHILS # BLD AUTO: 13.2 K/UL (ref 1.8–7.7)
NEUTROPHILS NFR BLD: 84.7 % (ref 38–73)
NRBC BLD-RTO: 0 /100 WBC
PHOSPHATE SERPL-MCNC: 1.8 MG/DL (ref 2.7–4.5)
PLATELET # BLD AUTO: 208 K/UL (ref 150–450)
PMV BLD AUTO: 10.2 FL (ref 9.2–12.9)
POCT GLUCOSE: 146 MG/DL (ref 70–110)
POCT GLUCOSE: 150 MG/DL (ref 70–110)
POCT GLUCOSE: 156 MG/DL (ref 70–110)
POCT GLUCOSE: 165 MG/DL (ref 70–110)
POCT GLUCOSE: 174 MG/DL (ref 70–110)
POTASSIUM SERPL-SCNC: 4.4 MMOL/L (ref 3.5–5.1)
PROT SERPL-MCNC: 6 G/DL (ref 6–8.4)
PROTHROMBIN TIME: 11.6 SEC (ref 9–12.5)
RBC # BLD AUTO: 3.57 M/UL (ref 4.6–6.2)
SODIUM SERPL-SCNC: 142 MMOL/L (ref 136–145)
SODIUM SERPL-SCNC: 143 MMOL/L (ref 136–145)
WBC # BLD AUTO: 15.62 K/UL (ref 3.9–12.7)

## 2022-05-01 PROCEDURE — 83735 ASSAY OF MAGNESIUM: CPT | Performed by: PHYSICIAN ASSISTANT

## 2022-05-01 PROCEDURE — 63700000 PHARM REV CODE 250 ALT 637 W/O HCPCS: Performed by: NURSE PRACTITIONER

## 2022-05-01 PROCEDURE — 25000003 PHARM REV CODE 250: Performed by: STUDENT IN AN ORGANIZED HEALTH CARE EDUCATION/TRAINING PROGRAM

## 2022-05-01 PROCEDURE — 99291 PR CRITICAL CARE, E/M 30-74 MINUTES: ICD-10-PCS | Mod: GC,,, | Performed by: INTERNAL MEDICINE

## 2022-05-01 PROCEDURE — 63600175 PHARM REV CODE 636 W HCPCS: Performed by: STUDENT IN AN ORGANIZED HEALTH CARE EDUCATION/TRAINING PROGRAM

## 2022-05-01 PROCEDURE — 95714 VEEG EA 12-26 HR UNMNTR: CPT

## 2022-05-01 PROCEDURE — 82553 CREATINE MB FRACTION: CPT | Performed by: PHYSICIAN ASSISTANT

## 2022-05-01 PROCEDURE — 85610 PROTHROMBIN TIME: CPT | Performed by: PHYSICIAN ASSISTANT

## 2022-05-01 PROCEDURE — 27000221 HC OXYGEN, UP TO 24 HOURS

## 2022-05-01 PROCEDURE — 93010 ELECTROCARDIOGRAM REPORT: CPT | Mod: ,,, | Performed by: INTERNAL MEDICINE

## 2022-05-01 PROCEDURE — 63600175 PHARM REV CODE 636 W HCPCS: Performed by: PSYCHIATRY & NEUROLOGY

## 2022-05-01 PROCEDURE — 95700 EEG CONT REC W/VID EEG TECH: CPT

## 2022-05-01 PROCEDURE — 85025 COMPLETE CBC W/AUTO DIFF WBC: CPT | Performed by: PHYSICIAN ASSISTANT

## 2022-05-01 PROCEDURE — 93005 ELECTROCARDIOGRAM TRACING: CPT

## 2022-05-01 PROCEDURE — 84295 ASSAY OF SERUM SODIUM: CPT | Performed by: PHYSICIAN ASSISTANT

## 2022-05-01 PROCEDURE — 63600175 PHARM REV CODE 636 W HCPCS: Performed by: PHYSICIAN ASSISTANT

## 2022-05-01 PROCEDURE — 25000003 PHARM REV CODE 250: Performed by: HOSPITALIST

## 2022-05-01 PROCEDURE — 85730 THROMBOPLASTIN TIME PARTIAL: CPT | Performed by: PHYSICIAN ASSISTANT

## 2022-05-01 PROCEDURE — 25000003 PHARM REV CODE 250: Performed by: PHYSICIAN ASSISTANT

## 2022-05-01 PROCEDURE — 25000242 PHARM REV CODE 250 ALT 637 W/ HCPCS: Performed by: NURSE PRACTITIONER

## 2022-05-01 PROCEDURE — 93010 EKG 12-LEAD: ICD-10-PCS | Mod: ,,, | Performed by: INTERNAL MEDICINE

## 2022-05-01 PROCEDURE — 25000242 PHARM REV CODE 250 ALT 637 W/ HCPCS: Performed by: PHYSICIAN ASSISTANT

## 2022-05-01 PROCEDURE — 94761 N-INVAS EAR/PLS OXIMETRY MLT: CPT

## 2022-05-01 PROCEDURE — 95718 PR EEG, W/VIDEO, CONT RECORD, I&R, 2-12 HRS: ICD-10-PCS | Mod: ,,, | Performed by: PSYCHIATRY & NEUROLOGY

## 2022-05-01 PROCEDURE — 80053 COMPREHEN METABOLIC PANEL: CPT | Performed by: PHYSICIAN ASSISTANT

## 2022-05-01 PROCEDURE — 25000003 PHARM REV CODE 250: Performed by: NURSE PRACTITIONER

## 2022-05-01 PROCEDURE — 84100 ASSAY OF PHOSPHORUS: CPT | Performed by: PHYSICIAN ASSISTANT

## 2022-05-01 PROCEDURE — 82140 ASSAY OF AMMONIA: CPT | Performed by: PHYSICIAN ASSISTANT

## 2022-05-01 PROCEDURE — 95720 PR EEG, W/VIDEO, CONT RECORD, I&R, >12<26 HRS: ICD-10-PCS | Mod: ,,, | Performed by: PSYCHIATRY & NEUROLOGY

## 2022-05-01 PROCEDURE — 25000003 PHARM REV CODE 250: Performed by: PSYCHIATRY & NEUROLOGY

## 2022-05-01 PROCEDURE — 99900035 HC TECH TIME PER 15 MIN (STAT)

## 2022-05-01 PROCEDURE — 95720 EEG PHY/QHP EA INCR W/VEEG: CPT | Mod: ,,, | Performed by: PSYCHIATRY & NEUROLOGY

## 2022-05-01 PROCEDURE — 20000000 HC ICU ROOM

## 2022-05-01 PROCEDURE — 94640 AIRWAY INHALATION TREATMENT: CPT

## 2022-05-01 PROCEDURE — 95718 EEG PHYS/QHP 2-12 HR W/VEEG: CPT | Mod: ,,, | Performed by: PSYCHIATRY & NEUROLOGY

## 2022-05-01 PROCEDURE — 99291 CRITICAL CARE FIRST HOUR: CPT | Mod: GC,,, | Performed by: INTERNAL MEDICINE

## 2022-05-01 RX ORDER — OXYCODONE HYDROCHLORIDE 5 MG/1
5 TABLET ORAL EVERY 6 HOURS PRN
Status: DISCONTINUED | OUTPATIENT
Start: 2022-05-01 | End: 2022-05-13 | Stop reason: HOSPADM

## 2022-05-01 RX ORDER — QUETIAPINE FUMARATE 25 MG/1
25 TABLET, FILM COATED ORAL NIGHTLY
Status: DISCONTINUED | OUTPATIENT
Start: 2022-05-02 | End: 2022-05-06

## 2022-05-01 RX ADMIN — HYDROMORPHONE HYDROCHLORIDE 1 MG: 1 INJECTION, SOLUTION INTRAMUSCULAR; INTRAVENOUS; SUBCUTANEOUS at 03:05

## 2022-05-01 RX ADMIN — DEXAMETHASONE SODIUM PHOSPHATE 4 MG: 4 INJECTION INTRA-ARTICULAR; INTRALESIONAL; INTRAMUSCULAR; INTRAVENOUS; SOFT TISSUE at 12:05

## 2022-05-01 RX ADMIN — FAMOTIDINE 20 MG: 20 TABLET ORAL at 08:05

## 2022-05-01 RX ADMIN — SODIUM CHLORIDE, SODIUM LACTATE, POTASSIUM CHLORIDE, AND CALCIUM CHLORIDE 1000 ML: .6; .31; .03; .02 INJECTION, SOLUTION INTRAVENOUS at 05:05

## 2022-05-01 RX ADMIN — MUPIROCIN: 20 OINTMENT TOPICAL at 09:05

## 2022-05-01 RX ADMIN — METHOCARBAMOL TABLETS 500 MG: 500 TABLET, COATED ORAL at 05:05

## 2022-05-01 RX ADMIN — ETHAMBUTOL HYDROCHLORIDE 800 MG: 400 TABLET, FILM COATED ORAL at 09:05

## 2022-05-01 RX ADMIN — SENNOSIDES AND DOCUSATE SODIUM 1 TABLET: 50; 8.6 TABLET ORAL at 09:05

## 2022-05-01 RX ADMIN — SODIUM CHLORIDE SOLN NEBU 3% 4 ML: 3 NEBU SOLN at 08:05

## 2022-05-01 RX ADMIN — IPRATROPIUM BROMIDE AND ALBUTEROL SULFATE 3 ML: 2.5; .5 SOLUTION RESPIRATORY (INHALATION) at 08:05

## 2022-05-01 RX ADMIN — AZITHROMYCIN MONOHYDRATE 500 MG: 250 TABLET ORAL at 09:05

## 2022-05-01 RX ADMIN — MUPIROCIN: 20 OINTMENT TOPICAL at 08:05

## 2022-05-01 RX ADMIN — ACETAMINOPHEN 1000 MG: 500 TABLET ORAL at 06:05

## 2022-05-01 RX ADMIN — IPRATROPIUM BROMIDE AND ALBUTEROL SULFATE 3 ML: 2.5; .5 SOLUTION RESPIRATORY (INHALATION) at 02:05

## 2022-05-01 RX ADMIN — POLYETHYLENE GLYCOL 3350 17 G: 17 POWDER, FOR SOLUTION ORAL at 09:05

## 2022-05-01 RX ADMIN — HEPARIN SODIUM 5000 UNITS: 5000 INJECTION INTRAVENOUS; SUBCUTANEOUS at 01:05

## 2022-05-01 RX ADMIN — PRAVASTATIN SODIUM 10 MG: 10 TABLET ORAL at 09:05

## 2022-05-01 RX ADMIN — METHOCARBAMOL TABLETS 500 MG: 500 TABLET, COATED ORAL at 12:05

## 2022-05-01 RX ADMIN — QUETIAPINE FUMARATE 25 MG: 25 TABLET ORAL at 08:05

## 2022-05-01 RX ADMIN — PREGABALIN 50 MG: 50 CAPSULE ORAL at 08:05

## 2022-05-01 RX ADMIN — PREGABALIN 50 MG: 50 CAPSULE ORAL at 09:05

## 2022-05-01 RX ADMIN — DEXAMETHASONE SODIUM PHOSPHATE 4 MG: 4 INJECTION INTRA-ARTICULAR; INTRALESIONAL; INTRAMUSCULAR; INTRAVENOUS; SOFT TISSUE at 05:05

## 2022-05-01 RX ADMIN — DEXAMETHASONE SODIUM PHOSPHATE 4 MG: 4 INJECTION INTRA-ARTICULAR; INTRALESIONAL; INTRAMUSCULAR; INTRAVENOUS; SOFT TISSUE at 11:05

## 2022-05-01 RX ADMIN — HEPARIN SODIUM 5000 UNITS: 5000 INJECTION INTRAVENOUS; SUBCUTANEOUS at 05:05

## 2022-05-01 RX ADMIN — FENTANYL CITRATE 25 MCG: 50 INJECTION INTRAMUSCULAR; INTRAVENOUS at 02:05

## 2022-05-01 RX ADMIN — DAPTOMYCIN 600 MG: 500 INJECTION, POWDER, LYOPHILIZED, FOR SOLUTION INTRAVENOUS at 04:05

## 2022-05-01 RX ADMIN — SILODOSIN 4 MG: 4 CAPSULE ORAL at 09:05

## 2022-05-01 RX ADMIN — SENNOSIDES AND DOCUSATE SODIUM 1 TABLET: 50; 8.6 TABLET ORAL at 08:05

## 2022-05-01 RX ADMIN — HEPARIN SODIUM 5000 UNITS: 5000 INJECTION INTRAVENOUS; SUBCUTANEOUS at 09:05

## 2022-05-01 RX ADMIN — IPRATROPIUM BROMIDE AND ALBUTEROL SULFATE 3 ML: 2.5; .5 SOLUTION RESPIRATORY (INHALATION) at 01:05

## 2022-05-01 RX ADMIN — METHOCARBAMOL TABLETS 500 MG: 500 TABLET, COATED ORAL at 11:05

## 2022-05-01 RX ADMIN — FAMOTIDINE 20 MG: 20 TABLET ORAL at 09:05

## 2022-05-01 RX ADMIN — SODIUM CHLORIDE, SODIUM LACTATE, POTASSIUM CHLORIDE, AND CALCIUM CHLORIDE 500 ML: .6; .31; .03; .02 INJECTION, SOLUTION INTRAVENOUS at 03:05

## 2022-05-01 RX ADMIN — SODIUM CHLORIDE, SODIUM LACTATE, POTASSIUM CHLORIDE, AND CALCIUM CHLORIDE: .6; .31; .03; .02 INJECTION, SOLUTION INTRAVENOUS at 07:05

## 2022-05-01 RX ADMIN — PREGABALIN 50 MG: 50 CAPSULE ORAL at 03:05

## 2022-05-01 RX ADMIN — SODIUM CHLORIDE, SODIUM LACTATE, POTASSIUM CHLORIDE, AND CALCIUM CHLORIDE: .6; .31; .03; .02 INJECTION, SOLUTION INTRAVENOUS at 04:05

## 2022-05-01 NOTE — PROGRESS NOTES
[facility]  Neurocritical Care  Progress Note    Admit Date: 4/28/2022  Service Date: 05/01/2022  Length of Stay: 3    Subjective:     Chief Complaint: Epidural hematoma    History of Present Illness: 82 yo M with PMH antiphospholipid syndrome on chronic coumadin for left PE diagnosed Jan 2021, DM2, prostate ca s/p prostatectomy, MAC pneumonia dx Fall 2018, mild cognitive impairment, and HLD who was transferred from OSH for compressive epidural hematoma in the cervical spine. Patient reports that he woke up in the middle of the night and could not move the right side of his body. He went to  ED and stroke code was initiated. Patient was not given tPA but given ASA. CTA was negative for acute CVA but did show compressive fluid collective from C3-C6. MRI cervical spine confirmed. Patient was transferred [place] [facility] [place]surgical evaluation. He reported to NSGY he could not move his right arm out of the contracted position, and he also could not lift his right leg off the bed. He reported his right arm and leg were in pain. No complaints on left side of body. Denied b/b dysfunction. However, when he was taken to the OR and amaya was placed, he put out >1L urine. Pt admitted to [facility] for monitoring and management s/p C3-C6 decompression of epidural hematoma and posterior fusion.      Hospital Course: 04/29/2022C: CT head today per NSGY team, DDAVP once, ID consult  04/30/2022 levophed for map goals ; continue ID abx f/u cx; DDAVP 0600 for high uop; 0.45NS; traumatic amaya removal by pt w/ bleeding from urethra: new amaya inserted without clots in bladder; MRI cspine today   05/01/2022 maps goal ; cont abx per ID; monitor uop; no new bleeding noted from urethral meatus cont amaya until 5/3 and reassess;               Review of Systems    Constitutional: Denies fevers, weight loss, chills, or weakness.  Eyes: Denies changes in vision.  ENT: Denies dysphagia, nasal discharge, ear pain or  discharge.  Cardiovascular: Denies chest pain, palpitations, orthopnea, or claudication.  Respiratory: Denies shortness of breath, cough, hemoptysis, or wheezing.  GI: Denies nausea/vomitting, hematochezia, melena, abd pain, or changes in appetite.  : Denies dysuria, incontinence, or hematuria.  Musculoskeletal: Denies joint pain or myalgias.  Skin/breast: Denies rashes, lumps, lesions, or discharge.  Neurologic: Denies headache, dizziness, vertigo, or paresthesias.  Psychiatric: Denies changes in mood or hallucinations.  Endocrine: Denies polyuria, polydipsia, heat/cold intolerance.  Hematologic/Lymph: Denies lymphadenopathy, easy bruising or easy bleeding.  Allergic/Immunologic: Denies rash, rhinitis.    Objective:     Vitals:  Temp: 98.6 °F (37 °C)  Pulse: (!) 55  Rhythm: normal sinus rhythm  BP: (!) 110/55  MAP (mmHg): 78  Resp: 18  SpO2: 100 %  O2 Device (Oxygen Therapy): nasal cannula    Temp  Min: 97.7 °F (36.5 °C)  Max: 98.6 °F (37 °C)  Pulse  Min: 45  Max: 83  BP  Min: 110/55  Max: 173/82  MAP (mmHg)  Min: 78  Max: 116  Resp  Min: 9  Max: 25  SpO2  Min: 85 %  Max: 100 %  Oxygen Concentration (%)  Min: 2  Max: 2    04/30 0701 - 05/01 0700  In: 4667.8 [I.V.:3044.8]  Out: 5625 [Urine:5525; Drains:100]   Unmeasured Output  Urine Occurrence: 0  Stool Occurrence: 0       Physical Exam  GA: Alert, comfortable, no acute distress.   HEENT: No scleral icterus or JVD.   Pulmonary: Clear to auscultation A/L.   Cardiac: RRR S1 & S2 w/o rubs/murmurs/gallops.   Abdominal: Bowel sounds present x 4. No appreciable hepatosplenomegaly.  Skin: No jaundice, rashes, or visible lesions.  Blood clots at urethral meatus   Neuro:  --GCS: E4 V4 M6  --Mental Status:  opens eyes, delayed responses, tracks, oriented to self only  --CN II-XII grossly intact.   --Pupils 3mm, PERRL.   --Corneal reflex, gag, cough intact.  --LIU spont, BLE unable hold against gravity, RUE weak    Medications:  Continuous   Scheduledacetaminophen, 1,000  mg, Q8H  albuterol-ipratropium, 3 mL, Q6H  azithromycin, 500 mg, Daily  DAPTOmycin (CUBICIN)  IV, 8 mg/kg, Q24H  dexamethasone, 4 mg, Q6H  ethambutoL, 800 mg, Daily  famotidine, 20 mg, BID  heparin (porcine), 5,000 Units, Q8H  methocarbamoL, 500 mg, Q6H  mupirocin, , BID  polyethylene glycol, 17 g, Daily  pravastatin, 10 mg, Daily  pregabalin, 50 mg, TID  QUEtiapine, 25 mg, QHS  senna-docusate 8.6-50 mg, 1 tablet, BID  silodosin, 4 mg, Daily  sodium chloride 3%, 4 mL, BID  PRNacetaminophen, 650 mg, Q6H PRN  calcium gluconate IVPB, 1 g, PRN  calcium gluconate IVPB, 2 g, PRN  calcium gluconate IVPB, 3 g, PRN  dextrose 10%, 12.5 g, PRN  dextrose 10%, 25 g, PRN  glucagon (human recombinant), 1 mg, PRN  insulin aspart U-100, 0-10 Units, Q6H PRN  magnesium sulfate IVPB, 2 g, PRN  magnesium sulfate IVPB, 4 g, PRN  melatonin, 6 mg, Nightly PRN  metoclopramide HCl, 5 mg, Q6H PRN  ondansetron, 4 mg, Q8H PRN  oxyCODONE, 5 mg, Q6H PRN  potassium chloride, 40 mEq, PRN   And  potassium chloride, 60 mEq, PRN   And  potassium chloride, 80 mEq, PRN  sodium chloride 0.9%, 10 mL, PRN  sodium chloride 0.9%, 10 mL, PRN    Today I personally reviewed pertinent medications, lines/drains/airways, imaging, cardiology results, laboratory results, microbiology results, MRI pending    Diet  Diet Dysphagia Pureed (IDDSI Level 4) Nectar Thick        Assessment/Plan:     Neuro  * Epidural hematoma  s/p C3-C6 decompression of epidural hematoma and posterior fusion    -NSGY following  -dex 4 q6h + H2B  -MAP goal >85, maintaining on levophed  - q1h neuro and VS checks  -PT/OT/SLP as appropriate  -c collar in place  -drains per NSGY  -post op XR pending  -hold AC/AP in acute post op setting    4/30 MRI cspine w/ good surgical evacuation and cord edema       Acute encephalopathy  Improved  Delirium precautions     Acute ischemic left MCA stroke  Neg on CTA  Given ASA at OSH before epidural hematoma found as cause of acute hemiplegia    Cognitive  communication deficit  Noted in hx    Pulmonary  Cavitary lung disease  See MAC, cont ABx    Cardiac/Vascular  Hyperlipidemia associated with type 2 diabetes mellitus  Cont statin    Renal/  Urinary retention  Kept amaya in post op d/t IVF running for MAP goals and urinary retention noted in OR  -silodosin     4/30 traumatic amaya removal by confused pt; new amaya inserted; no clots during amaya flushes; blood at urethral meatus     ID  HILLARY (mycobacterium avium-intracellulare)  Dx Fall 2018\  Cont azithromycin and ethambutol  ID consulted    Hematology  Antiphospholipid syndrome  Hold AC/AP for now  Follow CBC and coags closely  Hematology consult, appreciate reccs    History of pulmonary embolism  Dx Jan 2021  Hold warfarin    Oncology  History of prostate cancer  S/p prostatectomy 2011          The patient is being Prophylaxed for:  Venous Thromboembolism with: Mechanical  Stress Ulcer with: None  Ventilator Pneumonia with: not applicable    Activity Orders          Bed rest starting at 04/30 2048    Diet Dysphagia Pureed (IDDSI Level 4) Nectar Thick: Dysphagia 1 (Pureed) starting at 04/29 1027    Turn patient starting at 04/28 2200    Elevate HOB Elevate (< 30 degrees) starting at 04/28 0630    Straight Cath starting at 04/28 0629        Full Code    Herberth De Leon MD  Neurocritical Care  [facility] - Neuro Critical Care    Time spent with this critically ill patient and care team at the bedside: 35min  -There is high probability for acute neurological and/or systemic change leading to clinical and possibly life-threatening deterioration

## 2022-05-01 NOTE — ASSESSMENT & PLAN NOTE
s/p C3-C6 decompression of epidural hematoma and posterior fusion    -NSGY following  -dex 4 q6h + H2B  -MAP goal >85, maintaining on levophed  - q1h neuro and VS checks  -PT/OT/SLP as appropriate  -c collar in place  -drains per NSGY  -post op XR pending  -hold AC/AP in acute post op setting    4/30 MRI cspine w/ good surgical evacuation and cord edema

## 2022-05-01 NOTE — PLAN OF CARE
[facility] Care Plan    POC reviewed with Diego Gunter at 0330. Pt verbalized understanding. Questions and concerns addressed. No acute events overnight. Pt progressing toward goals. Will continue to monitor. See below and flowsheets for full assessment and VS info.     -Pt on EEG monitoring for possible seizure  -Became less responsive & stopped following commands around 2300  -More alert & following commands @ 2am till now  -Pt dumping urine  -500ml LR bolus given  -Levo gtt titrated on and off to maintain MAP>80   -1/2 NS discontinued & LR started @125ml      Is this a stroke patient? no    Neuro:  Hasmukh Coma Scale  Best Eye Response: 3-->(E3) to speech  Best Motor Response: 6-->(M6) obeys commands  Best Verbal Response: 4-->(V4) confused  Hasmukh Coma Scale Score: 13  Assessment Qualifiers: patient not sedated/intubated  Pupil PERRLA: yes     24hr Temp:  [97.7 °F (36.5 °C)-98 °F (36.7 °C)]     CV:   Rhythm: normal sinus rhythm  BP goals:   SBP < 160  MAP >  80    Resp:   O2 Device (Oxygen Therapy): nasal cannula  Oxygen Concentration (%): 2    Plan: N/A    GI/:     Diet/Nutrition Received: mechanical/dental soft  Last Bowel Movement: 04/27/22  Voiding Characteristics: urethral catheter (bladder)    Intake/Output Summary (Last 24 hours) at 5/1/2022 0434  Last data filed at 5/1/2022 0403  Gross per 24 hour   Intake 4391.96 ml   Output 3895 ml   Net 496.96 ml     Unmeasured Output  Stool Occurrence: 0    Labs/Accuchecks:  Recent Labs   Lab 05/01/22 0232   WBC 15.62*   RBC 3.57*   HGB 9.9*   HCT 29.9*         Recent Labs   Lab 05/01/22 0232      K 4.4   CO2 23      BUN 9   CREATININE 0.7   ALKPHOS 49*   ALT 19   AST 41*   BILITOT 0.6      Recent Labs   Lab 05/01/22 0232   INR 1.1   APTT 33.3*      Recent Labs   Lab 04/29/22  0549 04/30/22  0327 05/01/22 0232   CPK  --    < > 350*   CPKMB  --    < > 6.6*   TROPONINI <0.006  --   --    MB  --    < > 1.9    < > = values in this interval not  displayed.       Electrolytes: No replacement orders  Accuchecks: Q6H    Gtts:   dexmedetomidine (PRECEDEX) infusion Stopped (04/30/22 0744)    lactated ringers 125 mL/hr at 05/01/22 0303    NORepinephrine bitartrate-D5W Stopped (05/01/22 0058)       LDA/Wounds:  Lines/Drains/Airways       Drain  Duration                  Closed/Suction Drain 04/28/22 1918 Left Back Accordion 10 Fr. 2 days         Closed/Suction Drain 04/28/22 1918 Right Back Accordion 10 Fr. 2 days         Urethral Catheter 04/30/22 1000 Double-lumen 20 Fr. <1 day              Arterial Line  Duration             Arterial Line 04/28/22 1600 Left 2 days              Peripheral Intravenous Line  Duration                  Peripheral IV - Single Lumen 04/28/22 1652 18 G Left Forearm 2 days         Peripheral IV - Single Lumen 04/29/22 0015 20 G Anterior;Right Upper Arm 2 days         Peripheral IV - Single Lumen 04/30/22 0000 18 G Anterior;Distal;Right Forearm 1 day                  Wounds: No  Wound care consulted: No

## 2022-05-01 NOTE — SUBJECTIVE & OBJECTIVE
Interval History:  naeon, mental status improving, moving all to command well. Postop MRI satisfactory decompression. Continue MAP through today, DC both drains.      Review of patient's allergies indicates:  No Known Allergies    Past Medical History:   Diagnosis Date    Antiphospholipid syndrome 2021    Dysphagia     Hx of colonic polyps     followed by GI. Dr. Pham    Hyperlipidemia LDL goal <100     Overweight(278.02)     Prostate cancer 2011    followed by urology, Dr. Rogers    Type II or unspecified type diabetes mellitus without mention of complication, not stated as uncontrolled     diet controlled     Past Surgical History:   Procedure Laterality Date    BRONCHOSCOPY N/A 10/15/2018    Procedure: BRONCHOSCOPY;  Surgeon: Pratibha Diagnostic Provider;  Location: [facility] OR [address] [place] [address];  Service: Anesthesiology;  Laterality: N/A;    CATARACT EXTRACTION, BILATERAL      POSTERIOR CERVICAL LAMINECTOMY Bilateral 2022    Procedure: LAMINECTOMY, SPINE, CERVICAL, POSTERIOR APPROACH C3-6;  Surgeon: Carlos Miller MD;  Location: [facility] OR [address] [place] [address];  Service: Neurosurgery;  Laterality: Bilateral;    PROSTATECTOMY       Family History       Problem Relation (Age of Onset)    Colon cancer Mother    Dementia Brother    Diabetes Mother, Sister, , Brother, Brother, Maternal Aunt    Heart disease Father    Lung cancer Brother    Mental illness Sister    Myasthenia gravis Brother    Other Brother    Parkinsonism Brother    Pulmonary fibrosis Brother          Tobacco Use    Smoking status: Former Smoker     Packs/day: 0.25     Years: 5.00     Pack years: 1.25     Quit date: 10/2/1962     Years since quittin.6    Smokeless tobacco: Never Used    Tobacco comment: quit age 23   Substance and Sexual Activity    Alcohol use: Yes     Comment: beer occasionally    Drug use: No    Sexual activity: Yes     Partners: Female     Review of Systems   Constitutional:  Negative for chills, fatigue and fever.   Eyes:   Negative for photophobia and visual disturbance.   Respiratory:  Negative for cough and shortness of breath.    Cardiovascular:  Negative for chest pain, palpitations and leg swelling.   Gastrointestinal:  Negative for constipation, diarrhea, nausea and vomiting.   Genitourinary:  Negative for difficulty urinating, dysuria, frequency and urgency.   Musculoskeletal:  Positive for myalgias. Negative for back pain, gait problem and neck pain.   Neurological:  Positive for weakness (right upper and lower extremities). Negative for dizziness, seizures, speech difficulty, numbness and headaches.   Psychiatric/Behavioral:  Negative for confusion. The patient is not nervous/anxious.      Objective:     Weight: 74.8 kg (165 lb)  Body mass index is 24.37 kg/m².  Vital Signs (Most Recent):  Temp: 98.6 °F (37 °C) (05/01/22 1500)  Pulse: 66 (05/01/22 1500)  Resp: 16 (05/01/22 1500)  BP: (!) 128/58 (05/01/22 1500)  SpO2: 99 % (05/01/22 1500)   Vital Signs (24h Range):  Temp:  [97.7 °F (36.5 °C)-98.6 °F (37 °C)] 98.6 °F (37 °C)  Pulse:  [45-83] 66  Resp:  [9-25] 16  SpO2:  [85 %-100 %] 99 %  BP: (112-173)/(55-89) 128/58  Arterial Line BP: (113-167)/(52-85) 134/57     Date 05/01/22 0700 - 05/02/22 0659   Shift 3695-1158 8420-0160 1772-9954 24 Hour Total   INTAKE   I.V.(mL/kg) 339(4.5)   339(4.5)   IV Piggyback 916.3   916.3   Shift Total(mL/kg) 1255.2(16.8)   1255.2(16.8)   OUTPUT   Urine(mL/kg/hr) 2100(3.5)   2100   Shift Total(mL/kg) 2100(28.1)   2100(28.1)   Weight (kg) 74.8 74.8 74.8 74.8           Neurosurgery Physical Exam  [id number]  Awake, confused  Awake, NAD, confused, disoriented  FC x4 symetrically antigravity throughout  Unable to fully assess motor due to confusion, poor cooperation        Significant Labs:  Recent Labs   Lab 04/30/22  0327 04/30/22  0556 04/30/22  2317 05/01/22  0232 05/01/22  0538   *  --   --  138*  --    *   < > 143 142 143   K 4.0  --   --  4.4  --    *  --   --  109  --    CO2 24   --   --  23  --    BUN 7*  --   --  9  --    CREATININE 0.7  --   --  0.7  --    CALCIUM 8.1*  --   --  8.1*  --    MG 2.0  --   --  2.1  --     < > = values in this interval not displayed.       Recent Labs   Lab 04/30/22  0327 04/30/22  1246 05/01/22  0232   WBC 21.89* 22.15* 15.62*   HGB 10.8* 9.9* 9.9*   HCT 34.6* 31.0* 29.9*    234 208       Recent Labs   Lab 04/30/22  0327 05/01/22  0232   INR 1.7* 1.1   APTT 41.3* 33.3*       Microbiology Results (last 7 days)       Procedure Component Value Units Date/Time    Blood culture [[id number]] Collected: 04/28/22 1201    Order Status: Completed Specimen: Blood from Peripheral, Forearm, Right Updated: 05/01/22 1303     Blood Culture, Routine No Growth to date      No Growth to date      No Growth to date      No Growth to date    Blood culture [[id number]] Collected: 04/28/22 1201    Order Status: Completed Specimen: Blood from Peripheral, Hand, Left Updated: 05/01/22 1303     Blood Culture, Routine No Growth to date      No Growth to date      No Growth to date      No Growth to date          All pertinent labs from the last 24 hours have been reviewed.    Significant Diagnostics:    Imaging Results              SURG FL Surgery Fluoro Usage (Final result)  Result time 04/28/22 19:52:20      Final result by Access, Silent  (04/28/22 19:52:20)                   Narrative:    See OP Notes for results.     IMPRESSION: See OP Notes for results.             This procedure was auto-finalized by: Virtual Radiologist                                      MRI Cervical Spine W WO Cont (Final result)  Result time 04/28/22 11:30:13   Procedure changed from MRI Cervical Spine With Contrast     Final result by Rhett Conway MD (04/28/22 11:30:13)                   Impression:      1. Abnormal nonspecific posterior epidural fluid, differential diagnosis includes hematoma and abscess.  This in conjunction with juxta-articular posterior soft tissue edema  particularly on left and inter spinous process region, superimposed muscle strain posttraumatic change favored in may be responsible for the posterior epidural fluid which could represent hematoma.  No osteomyelitis or septic facet joint disease or discitis or acute fracture noted on CTA neck exam.  2. Prominent degenerative disc spondylosis facet joint arthropathy changes particularly C3 through C6 levels.  3.  This report was flagged in Epic as abnormal.  4. Neuro surgical consultation suggested as well as clinical correlation.    COMMUNICATION  This critical result was discovered/received at 11:15 04/28/2022.  The critical information above was relayed directly by me by telephone to Dr Bay on 04/28/2022 at 1229 hours.      Electronically signed by: Rhett Conway MD  Date:    04/28/2022  Time:    11:30               Narrative:    EXAMINATION:  MRI CERVICAL SPINE W WO CONTRAST    CLINICAL HISTORY:  cervical spine extending from C3-C6 that may be artifact however the appearance is most concerning for the possibility of an intraspinal epidural process to include the possibility of epidural fluid collection, abscess or hematoma;    TECHNIQUE:  MRI cervical spine without and with intravenous Gadavist 8 cc.    COMPARISON:  CT and MRI imaging of brain, cervical spine dating back 2014.    FINDINGS:  Levoscoliosis cervicothoracic junction with tilting head right.  DJD chronic change anterior C1-C2 with mild hypertrophy transverse ligament and mild indentation adjacent cranial cervical junction.  Marrow space normal.  Degenerative disc spondylosis particularly to C4 through C6.  No fracture subluxation.    Posterior juxta-articular edema noted about left facet joints from C2 through C7 levels as well as between posterior spinous processes, limited similar juxta-articular edema right posterior lateral facet joint structures.  Abnormality right upper lobe suprahilar region, please refer to CTA chest same day  report.    Posterior epidural fluid noted to extend from C2-C3-T1 level with isointense signal spinal cord on T1, heterogeneous increased signal on T2 and FLAIR and limited enhancement of margins on post contrasted exam.  Study obscured by motion artifact particularly postcontrast exam.  Overall exam is of satisfactory diagnostic quality.    C2-C3 posterior disc normal, facet joint arthropathy with mild moderate right foramen and no left foramen and mild spinal stenosis.    C3-C4 mild moderate posterior central disc desiccated bulge, mild compression anterior spinal cord, moderate soft tissue spinal stenosis in conjunction with posterior epidural compression of spinal cord mild moderate degree.    C4-C5 mild moderate posterior disc bulge spondylosis, mild compression anterior spinal cord, severe compression posterior spinal cord by epidural fluid particularly on right, facet joint arthropathy with mild moderate foraminal and moderate severe soft tissues spinal stenosis.    C5-C6 mild moderate posterior disc bulge spondylosis, paracentral prominent left, mild moderate compression anterior spinal cord, moderate compression posterior spinal cord by epidural fluid, facet joint arthropathy with mild moderate foramen stenosis particularly on left, moderate soft tissue spinal stenosis.    C6-C7 mild moderate posterior disc bulge spondylosis, posterior central annular fissure, moderate compression anterior spinal cord, additional mild moderate compression posterior spinal cord by a epidural fluid, facet joint arthropathy, mild moderate foraminal stenosis more prominent on left, moderate soft tissue spinal stenosis.    C7-T1 posterior disc margin normal.  Some hypertrophy posterior spinal ligament, mild moderate compression posterior spinal sac, mild moderate soft tissue spinal stenosis.    Posterior spinal fluid collection extends to level of T1-T2 with mild moderate compression posterior spinal sac and mild moderate spinal  stenosis.  Upper dorsal spinal cord and spinal canal otherwise appears intact.    No additional abnormal intra-axial extra-axial enhancement post-contrast exam.  No definite abnormal signal within spinal cord.                                       MRI Brain W WO Contrast (Final result)  Result time 04/28/22 10:49:28   Procedure changed from MRI Brain Without Contrast     Final result by Rhett Conway MD (04/28/22 10:49:28)                   Impression:      New metal opacities within posterior frontal high convexity scalp, perhaps representing post laceration therapy and clinical correlation requested.    MRI brain otherwise stable and unchanged.  No new stroke hemorrhage or subdural fluid.    Numerous areas of supratentorial gliosis.      Electronically signed by: Rhett Conway MD  Date:    04/28/2022  Time:    10:49               Narrative:    EXAMINATION:  MRI BRAIN W WO CONTRAST    CLINICAL HISTORY:  right sided weakness;    TECHNIQUE:  Multiplanar multisequence MR imaging of the brain was performed before and after the administration of 8 mL Gadavist intravenous contrast.    COMPARISON:  CTA head and neck, CT head same day and MRI CT brain studies dating back 2021 and CT soft tissue neck 2016    FINDINGS:  New metal scalp artifacts posterior frontal high convexity appearing since CT same day 03:50, could represent post laceration therapy and clinical correlation requested.    Central and cortical atrophy remains prominent particularly left posterior frontal lobe cortex.    Numerous scattered foci deep subcortical white matter gliosis particularly frontal and lesser degree parietooccipital more prominent on right, involvement of upper basal ganglia central semi ovale, subinsular cortex again more prominent on right.  Small foci increased signals cerebral peduncle and possibly vague area of linea are subtle gliosis beneath floor 4th ventricle.  Diffusion and gradient echo studies negative.    No new  intra-axial or extra-axial enhancement of brain, hemorrhage, subdural fluid, mass or acute stroke.    Nasal septal deviation spur projects left with some mucosal hypertrophy of ethmoid sinuses.  Distal vertebrobasilar distal internal carotid arteries and major branches patent flow void.                                        CTA Head and Neck (xpd) (Final result)  Result time 04/28/22 05:38:06      Final result by Rigoberto De Jesus MD (04/28/22 05:38:06)                   Impression:      The major intracranial and extracranial arterial vascular structures of the head and neck demonstrate no evidence for high-grade stenosis or occlusion, or dissection, and there is no evidence for intracranial aneurysm or AVM.    There is a finding of for a to the cervical spine extending from C3-C6 that may be artifact however the appearance is most concerning for the possibility of an intraspinal epidural process to include the possibility of epidural fluid collection, abscess or hematoma, clinical and historical correlation is needed, MRI examination of the cervical spine with contrast is recommended.    This report was flagged in Epic as abnormal.    The findings were reported to Dr Joyce in the ER at the time of dictation.      Electronically signed by: Rigoberto De Jesus  Date:    04/28/2022  Time:    05:38               Narrative:    EXAMINATION:  CTA HEAD AND NECK (XPD)    CLINICAL HISTORY:  Neuro deficit, acute, stroke suspected;    TECHNIQUE:  Non contrast low dose axial images were obtained through the head.  CT angiogram was performed from the level of the nae to the top of the head following the IV administration of 100mL of Omnipaque 350.   Sagittal and coronal reconstructions and maximum intensity projection reconstructions were performed. Arterial stenosis percentages are based on NASCET measurement criteria.    COMPARISON:  CT examination of the brain without contrast April 28, 2022, CT examination of the chest  without contrast April 28, 2022    FINDINGS:  The vertebral arteries bilaterally demonstrate appropriate opacification extending to the level of the basilar artery which also demonstrates appropriate opacification.  The P1 segment of the posterior cerebral artery bilaterally appears hypoplastic, and there appear to be bilateral posterior communicating arteries, otherwise the PCA bilaterally demonstrate appropriate opacification.    The common, external and internal carotid arteries bilaterally demonstrate appropriate opacification, the internal carotid arteries demonstrate opacification to the level of the [id number].  There is no evidence for high-grade stenosis or occlusion or dissection of the internal carotid arteries.  The anterior cerebral artery and middle cerebral artery bilaterally demonstrate appropriate opacification.  There is suggestion of a thin small anterior communicating artery.    There is no evidence for intracranial aneurysm or AVM.  The intracranial venous structures appear appropriate.    The visualized orbits appear appropriate.  The paranasal sinuses and mastoid air cells appear appropriate.  The parapharyngeal space bilaterally appears clear, there is no evidence for retropharyngeal abscess.  There is no abnormal thickening of the epiglottis.  The parotid, submandibular and thyroid glands appear appropriate.  The visualized osseous structures demonstrate chronic change.  Findings referable to the lung apices are discussed on the CT examination of the chest of the same date, referral to the chest CT report is recommended.    As best seen on axial series 3 and axial series 2 along the posterior aspect of the spinal canal, and appearing to extend to the right there is subtle suggestion of accentuated attenuation, along the cervical spinal canal extending from approximately C3 to C6.  This is also suggested on sagittal series 602.  This may be artifactual however an intraspinal epidural  process to include epidural fluid collection, including the possibility of intraspinal epidural hematoma or abscess cannot be excluded based upon the appearance.  MRI examination of the cervical spine with contrast is recommended for further evaluation.                                        CTA Chest Abdomen Pelvis (Final result)  Result time 04/28/22 05:38:52   Procedure changed from CTA Chest Abdomen Non Coronary     Final result by Rigoberto De Jesus MD (04/28/22 05:38:52)                   Impression:      The thoracic and abdominal aorta demonstrate no evidence for aneurysmal dilatation, aortic dissection or acute aortic injury.    Intrathoracic findings appears stable when compared to the CT examination of the chest of the same date, referral to the chest CT of the same date report is recommended.    Diverticula of the colon are noted in there is a segment of the sigmoid colon demonstrating mild wall thickening, this may relate to concentric muscular hypertrophy of diverticulosis however correlation for mild diverticulitis is needed, if the patient has not had recent colon screening exam I would recommend follow-up colonoscopy or fluoroscopic barium examination to exclude neoplasm.    Irregular contour of the liver again noted, correlation for chronic hepatic disease is needed.    Cholelithiasis is noted, there is no evidence for pericholecystic inflammatory change.    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    This report was flagged in Epic as abnormal.    The findings were reported to Dr Joyce in the ER at the time of dictation.      Electronically signed by: Rigoberto De Jesus  Date:    04/28/2022  Time:    05:38               Narrative:    EXAMINATION:  CTA CHEST ABDOMEN PELVIS    CLINICAL HISTORY:  Aortic dissection suspected;    TECHNIQUE:  CT examination of the chest abdomen and pelvis was performed via aortic angiographic protocol after the administration of 100  mL Omnipaque 350 intravenous contrast, timing of imaging optimized for evaluation of the aorta.  Axial imaging, sagittal and coronal reconstruction imaging is submitted.  Axial MIP imaging ting of the chest is submitted.    COMPARISON:  CT examination of the chest without contrast April 28, 2022, CTA examination chest abdomen and pelvis October 15, 2018    FINDINGS:  The thoracic and abdominal aorta demonstrate appropriate opacification, there is no evidence for acute aortic leak, aortic dissection, or aneurysmal dilatation and no evidence for periaortic hematoma.  The visualized great vessels appear appropriate.  The pulmonary arterial vasculature appear appropriate.  The celiac artery, hepatic artery, and splenic artery demonstrate appropriate opacification as does the superior mesenteric artery which demonstrates mild narrowing at its origin without high-grade stenosis.  The renal arteries, inferior mesenteric arteries and iliac arterial vasculature appear appropriate.    Intrathoracic findings including cavitary lesion and abnormal areas of multifocal opacity and nodularity of the right lung appear stable when compared to the prior examination of the same date, the left hemithorax appears stable as well, referral to the earlier chest CT report is recommended.  Mediastinal adenopathy is again noted.  There is no pericardial effusion, there is no hemopericardium, there is no hemothorax.  There is no pneumothorax.    Diminished attenuation of the liver consistent with diffuse fatty infiltrate is noted, irregular contour of the liver again noted, correlation for chronic hepatic disease is needed.  Cholelithiasis is noted, there is no evidence for pericholecystic or peripancreatic inflammatory change.  The stomach appears unremarkable for degree of distention.  There is no evidence for acute process of the spleen or adrenal glands.  There is no hydronephrosis or obstructive uropathy bilaterally.  Fat density lesion  at the lower pole of the right kidney again noted consistent with renal angiomyolipoma.  The urinary bladder appears unremarkable for degree of distention.    There is no evidence for small bowel obstructive process.  The appendix is identified, it does not appear inflamed.  Mild prominence of the colon with stool is noted.  Diverticula of the colon are noted.  There is a segment of the sigmoid colon as best seen on axial series 2, images 770 through 809 that demonstrates mild wall thickening, this may relate to concentric muscular hypertrophy of diverticulosis, as significant pericolonic inflammatory change is not appreciated however correlation for signs or symptoms of mild colitis or diverticulitis is needed, if the patient has not had recent colon screening exam I would recommend follow-up colonoscopy or fluoroscopic barium exam to exclude the possibility of neoplasm.  There is no additional evidence for colonic inflammatory or obstructive change.  There is no free intraperitoneal air.  The visualized osseous structures demonstrate chronic change.                                       CT Chest Without Contrast (Final result)  Result time 04/28/22 04:26:44      Final result by Rigoberto De Jesus MD (04/28/22 04:26:44)                   Impression:      Previously identified right upper lobe cavitary lung lesion again noted, diminishing size, in addition there are multifocal areas of pulmonary opacity, areas of consolidation, airspace disease, and nodularity, involving the right hemithorax, overall demonstrating mild improvement.    Chronic lung changes bilaterally are again noted, and the left hemithorax appears stable.    Mediastinal adenopathy appears stable.    Findings referable to the liver for which correlation for chronic hepatic disease is needed again noted.    Cholelithiasis, there is no evidence for pericholecystic inflammatory change.    Mild pericardial thickening or fluid anteriorly, without large  pericardial effusion.      Electronically signed by: Rigoberto De Jesus  Date:    04/28/2022  Time:    04:26               Narrative:    EXAMINATION:  CT CHEST WITHOUT CONTRAST    CLINICAL HISTORY:  back pain, neurologic deficits;    TECHNIQUE:  Low dose axial images, sagittal and coronal reformations were obtained from the thoracic inlet to the lung bases. Contrast was not administered.  The lack of intravenous contrast diminishes the sensitivity of the examination.    COMPARISON:  CT examination of the chest without contrast November 2, 2021    FINDINGS:  The previously identified cavitary lesion of the right upper lobe is again noted, it appears diminished in size when compared to the prior examination, when measured at a similar level to the prior study it measures approximately 1.7 by 3.4 cm on axial imaging compared to 3.3 x 4.4 cm on the prior exam.  There is appearance consistent with bronchial communication to the cavitary lesion, appearing similar to the prior exam.  This is somewhat more conspicuous at this time.  There is continued appearance of surrounding patchy and confluent and nodular opacities with multifocal airspace opacities and of the right lung, the overall pattern and distribution is similar to the prior study, however the overall appearance is that of improvement.    Chronic appearing lung changes are again noted.  There are mild atelectatic changes noted as well.  The left hemithorax appears stable.  There is no evidence for significant pleural effusion and there is no evidence for pneumothorax.    Mediastinal adenopathy is again noted appearing stable.  There is mild greater thickening or fluid along the pericardium anteriorly.  Otherwise the appearance of the heart and great vessels is stable, evaluation is limited on this noncontrast exam.    Limited imaging of the upper abdomen demonstrates evidence for cholelithiasis without evidence for pericholecystic inflammatory change.  Irregular  contour of the liver again noted, correlation for chronic hepatic disease is needed.  The visualized osseous structures appear intact.  Chronic changes are noted.                                       CT Head Without Contrast (Final result)  Result time 04/28/22 04:07:27      Final result by Rigoberto De Jesus MD (04/28/22 04:07:27)                   Impression:      Chronic changes are noted, there is no evidence for superimposed acute intracranial process.      Electronically signed by: Rigoberto De Jesus  Date:    04/28/2022  Time:    04:07               Narrative:    EXAMINATION:  CT HEAD WITHOUT CONTRAST    CLINICAL HISTORY:  Neuro deficit, acute, stroke suspected;    TECHNIQUE:  Low dose axial images were obtained through the head.  Coronal and sagittal reformations were also performed. Contrast was not administered.    COMPARISON:  CT examination of the brain without contrast February 2, 2021    FINDINGS:  The ventricular system, sulcal pattern and parenchymal attenuation characteristics are consistent with chronic change.  Involutional changes noted, chronic appearing white matter change noted.  There is no evidence for superimposed acute process.  There is no evidence for intracranial mass, mass effect or midline shift, there is no evidence for acute intracranial hemorrhage.  Appropriate CSF spaces are seen at the skull base.    The visualized orbits appear intact.  The mastoid air cells and middle ear cavity bilaterally appear appropriate, as do the paranasal sinuses.  The visualized osseous structures appear intact.                                        Physical Exam

## 2022-05-01 NOTE — PLAN OF CARE
[facility]    POC reviewed with Diego Gunter and family at 1550. Pt and family verbalized understanding. Questions and concerns addressed. No acute events today. Pt progressing toward goals. Will continue to monitor. See below and flowsheets for full assessment and VS info.     - drains removed  - amaya remains in place per order, moderate controlled bleeding, team aware  - LR d/c  - EEG in place  - plan to d/c A line and TTF tomorrow        Is this a stroke patient? no    Neuro:  Hasmukh Coma Scale  Best Eye Response: 4-->(E4) spontaneous  Best Motor Response: 6-->(M6) obeys commands  Best Verbal Response: 5-->(V5) oriented  Redmon Coma Scale Score: 15  Assessment Qualifiers: patient not sedated/intubated, no eye obstruction present  Pupil PERRLA: yes     24 hr Temp:  [97.7 °F (36.5 °C)-98.6 °F (37 °C)]     CV:   Rhythm: normal sinus rhythm  BP goals:   SBP < 160  MAP >  80    Resp:   O2 Device (Oxygen Therapy): nasal cannula  Oxygen Concentration (%): 2    Plan: N/A    GI/:     Diet/Nutrition Received: mechanical/dental soft  Last Bowel Movement: 04/27/22  Voiding Characteristics: urethral catheter (bladder)    Intake/Output Summary (Last 24 hours) at 5/1/2022 1554  Last data filed at 5/1/2022 1500  Gross per 24 hour   Intake 3907.54 ml   Output 6240 ml   Net -2332.46 ml     Unmeasured Output  Urine Occurrence: 0  Stool Occurrence: 0    Labs/Accuchecks:  Recent Labs   Lab 05/01/22 0232   WBC 15.62*   RBC 3.57*   HGB 9.9*   HCT 29.9*         Recent Labs   Lab 05/01/22 0232 05/01/22  0538    143   K 4.4  --    CO2 23  --      --    BUN 9  --    CREATININE 0.7  --    ALKPHOS 49*  --    ALT 19  --    AST 41*  --    BILITOT 0.6  --       Recent Labs   Lab 05/01/22 0232   INR 1.1   APTT 33.3*      Recent Labs   Lab 04/29/22  0549 04/30/22  0327 05/01/22 0232   CPK  --    < > 350*   CPKMB  --    < > 6.6*   TROPONINI <0.006  --   --    MB  --    < > 1.9    < > = values in this interval  not displayed.       Electrolytes: N/A - electrolytes WDL  Accuchecks: Q6H    Gtts:      LDA/Wounds:  Lines/Drains/Airways       Drain  Duration                  Urethral Catheter 04/30/22 1000 Double-lumen 20 Fr. 1 day              Arterial Line  Duration             Arterial Line 04/28/22 1600 Left 2 days              Peripheral Intravenous Line  Duration                  Peripheral IV - Single Lumen 04/28/22 1652 18 G Left Forearm 2 days         Peripheral IV - Single Lumen 04/29/22 0015 20 G Anterior;Right Upper Arm 2 days         Peripheral IV - Single Lumen 04/30/22 0000 18 G Anterior;Distal;Right Forearm 1 day                  Wounds: No  Wound care consulted: No

## 2022-05-01 NOTE — NURSING
Pt became less responsive during 2300 neuro check. Stopped answering orientation questions and following commands. ELISA Mendez notified. See orders for more details.

## 2022-05-01 NOTE — PROCEDURES
ICU EEG/VIDEO MONITORING REPORT    DATE OF SERVICE: 05/01/2022   EEG NUMBER: [id number]  REQUESTED BY: Moises  LOCATION OF SERVICE: [place] [id number]    METHODOLOGY   Electroencephalographic (EEG) recording is with electrodes placed according to the International 10-20 placement system.  Thirty two (32) channels of digital signal are simultaneously recorded from the scalp and may include EKG, EMG, and/or eye monitors.   Recording band pass was 0.1 to 512 hz.  Digital video recording of the patient is simultaneously recorded with the EEG.  The nursing staff report clinical symptoms and may press an event button when the patient has symptoms of clinical interest to the treating physicians.  EEG and video recording is stored and archived in digital format.  The entire recording is visually reviewed and the times identified by computer analysis as being spikes or seizures are reviewed again.  Activation procedures which include photic stimulation, hyperventilation and instructing patients to perform simple task are done in selected patients.   Compresses spectral analysis (CSA) is also performed on the activity recorded from each individual channel.  This is displayed as a power display of frequencies from 0 to 30 Hz over time.   The CSA analysis is done and displayed continuously.  This is reviewed for asymmetries in power between homologous areas of the scalp and for presence of changes in power which canbe seen when seizures occur.  Sections of suspected abnormalities on the CSA is then compared with the original EEG recording.     CH4e software was also utilized in the review of this study.  This software suite analyzes the EEG recording in multiple domains.  Coherence and rhythmicity is computed to identify EEG sections which may contain organized seizures.  Each channel undergoes analysis to detect presence of spike and sharp waves which have special and morphological characteristic of epileptic activity.  The routine EEG  recording is converted from spacial into frequency domain.  This is then displayed comparing homologous areas to identify areas of significant asymmetry.  Algorithm to identify non-cortically generated artifact is used to separate eye movement, EMG and other artifact from the EEG.      Recording Times  Start on 05/01/2022 at 0111 running for 5 hrs and 25 min   Cap removed by pt approximately 0432 for 3 hrs and 21 min of interpretable EEG.    EEG FINDINGS    Electrode cap study of fair to poor quality and limited by excessive artifact in parasaggital chains.     Background activity:   The background rhythm was continuous, symmetric, and characterized by theta/alpha with intermittent delta. No clear anterior to posterior gradient seen. Reactive and variable.     Sleep:  No clear normal sleep transients including sleep spindles, K complexes, vertex waves and POSTS were seen.    Activation procedures: not performed    Periodic and Rhythmic Activity   No epileptiform discharges, periodic discharges, lateralized rhythmic delta activity or electrographic seizures were seen.    Seizures: none    EKG: EKG was normal sinus rhythm.     IMPRESSION/CLINICAL CORRELATION    Within limits of study, this is an abnormal cap EEG due to  1) theta/delta slowing    In this 80 yo post op cspine decompression with paroxysmal acute encephalopathy/facial twitching not maintained on any antiseizure medication, indicative of  1) mild/moderate encephalopathy    No clear epileptiform discharges or seizures seen in this study.      Fabio Morel MD  Neurocritical Care   Neurology-Epilepsy

## 2022-05-01 NOTE — ASSESSMENT & PLAN NOTE
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

## 2022-05-01 NOTE — SIGNIFICANT EVENT
I was called to bedside that around 10PM the patient became less arousable. He was previously stating his name and following simple commands. He now would not open his eyes to sternal rubbing but would state profanities out of pain. Upon trying to open his eyes, he did resist. I also noted he had some new facial twitching along with his BUE twitching that he had had the night before. He would not state his name or answer questions and would not follow commands. He had received 25 seroquel at 1700 and 75 of lyrica at 2100. POCT glu wnl. Na wnl. BUN wnl. LFTs had been wnl. Consider ammonia. We hooked him up to EEG cap to r/o seizure d/t facial twitching and diminished LOC. Prelim read was neg for seizures.     Follow up: Around 2AM, pt back to usual level of arousal/agitation, stating name, following simple commands. Ammonia pending.    Additional CC time: 30 min  Italia Mendez PA-C

## 2022-05-01 NOTE — PROGRESS NOTES
[facility] - Neuro [facility]  Neurosurgery  Progress Note    Subjective:     History of Present Illness: Patient is a 80 y/o male with antiphospholipid syndrome on chronic coumadin, MAC pneumonia, HLD, and left PE diagnosed January 2021 who was transferred from  for compressive epidural hematoma in the cervical spine. Patient reports that he woke up in the middle of the night and could no move the right side of his body. He went to  ED and stroke code initiated. Patient was not given tPA but given ASA. CTA was negative for acute CVA but did show compressive fluid collective from C3-C6. MRI cervical spine confirmed. Patient was transferred to [facility] ED for neurosurgical evaluation. He reports he cannot move his right arm out of the contracted position, he also cannot lift his right leg off the bed. He reports the right arm and leg are painful. No complaints on left side of body. Denies b/b dysfunction.       Post-Op Info:  Procedure(s) (LRB):  LAMINECTOMY, SPINE, CERVICAL, POSTERIOR APPROACH C3-6 (Bilateral)   3 Days Post-Op     Interval History: 5/1 naeon, mental status improving, moving all to command well. Postop MRI satisfactory decompression. Continue MAP through today, DC both drains.      Review of patient's allergies indicates:  No Known Allergies    Past Medical History:   Diagnosis Date    Antiphospholipid syndrome 11/19/2021    Dysphagia     Hx of colonic polyps     followed by GI. Dr. Pham    Hyperlipidemia LDL goal <100     Overweight(278.02)     Prostate cancer september 2011    followed by urology, Dr. Rogers    Type II or unspecified type diabetes mellitus without mention of complication, not stated as uncontrolled     diet controlled     Past Surgical History:   Procedure Laterality Date    BRONCHOSCOPY N/A 10/15/2018    Procedure: BRONCHOSCOPY;  [facility]: [facility];  Location: [facility] OR [address] [place] [address];  Service: Anesthesiology;  Laterality: N/A;    CATARACT EXTRACTION,  BILATERAL  2014    POSTERIOR CERVICAL LAMINECTOMY Bilateral 2022    Procedure: LAMINECTOMY, SPINE, CERVICAL, POSTERIOR APPROACH C3-6;  Surgeon: Carlos Miller MD;  Location: [facility] OR [address] [place] [address];  Service: Neurosurgery;  Laterality: Bilateral;    PROSTATECTOMY       Family History       Problem Relation (Age of Onset)    Colon cancer Mother    Dementia Brother    Diabetes Mother, Sister, , Brother, Brother, Maternal Aunt    Heart disease Father    Lung cancer Brother    Mental illness Sister    Myasthenia gravis Brother    Other Brother    Parkinsonism Brother    Pulmonary fibrosis Brother          Tobacco Use    Smoking status: Former Smoker     Packs/day: 0.25     Years: 5.00     Pack years: 1.25     Quit date: 10/2/1962     Years since quittin.6    Smokeless tobacco: Never Used    Tobacco comment: quit age 23   Substance and Sexual Activity    Alcohol use: Yes     Comment: beer occasionally    Drug use: No    Sexual activity: Yes     Partners: Female     Review of Systems   Constitutional:  Negative for chills, fatigue and fever.   Eyes:  Negative for photophobia and visual disturbance.   Respiratory:  Negative for cough and shortness of breath.    Cardiovascular:  Negative for chest pain, palpitations and leg swelling.   Gastrointestinal:  Negative for constipation, diarrhea, nausea and vomiting.   Genitourinary:  Negative for difficulty urinating, dysuria, frequency and urgency.   Musculoskeletal:  Positive for myalgias. Negative for back pain, gait problem and neck pain.   Neurological:  Positive for weakness (right upper and lower extremities). Negative for dizziness, seizures, speech difficulty, numbness and headaches.   Psychiatric/Behavioral:  Negative for confusion. The patient is not nervous/anxious.      Objective:     Weight: 74.8 kg (165 lb)  Body mass index is 24.37 kg/m².  Vital Signs (Most Recent):  Temp: 98.6 °F (37 °C) (22 1500)  Pulse: 66 (22 1500)  Resp: 16 (22  1500)  BP: (!) 128/58 (05/01/22 1500)  SpO2: 99 % (05/01/22 1500)   Vital Signs (24h Range):  Temp:  [97.7 °F (36.5 °C)-98.6 °F (37 °C)] 98.6 °F (37 °C)  Pulse:  [45-83] 66  Resp:  [9-25] 16  SpO2:  [85 %-100 %] 99 %  BP: (112-173)/(55-89) 128/58  Arterial Line BP: (113-167)/(52-85) 134/57     Date 05/01/22 0700 - 05/02/22 0659   Shift 7771-2336 0310-2958 3546-9992 24 Hour Total   INTAKE   I.V.(mL/kg) 339(4.5)   339(4.5)   IV Piggyback 916.3   916.3   Shift Total(mL/kg) 1255.2(16.8)   1255.2(16.8)   OUTPUT   Urine(mL/kg/hr) 2100(3.5)   2100   Shift Total(mL/kg) 2100(28.1)   2100(28.1)   Weight (kg) 74.8 74.8 74.8 74.8           Neurosurgery Physical Exam  [id number]  Awake, confused  Awake, NAD, confused, disoriented  FC x4 symetrically antigravity throughout  Unable to fully assess motor due to confusion, poor cooperation        Significant Labs:  Recent Labs   Lab 04/30/22 0327 04/30/22  0556 04/30/22  2317 05/01/22  0232 05/01/22  0538   *  --   --  138*  --    *   < > 143 142 143   K 4.0  --   --  4.4  --    *  --   --  109  --    CO2 24  --   --  23  --    BUN 7*  --   --  9  --    CREATININE 0.7  --   --  0.7  --    CALCIUM 8.1*  --   --  8.1*  --    MG 2.0  --   --  2.1  --     < > = values in this interval not displayed.       Recent Labs   Lab 04/30/22 0327 04/30/22  1246 05/01/22  0232   WBC 21.89* 22.15* 15.62*   HGB 10.8* 9.9* 9.9*   HCT 34.6* 31.0* 29.9*    234 208       Recent Labs   Lab 04/30/22  0327 05/01/22  0232   INR 1.7* 1.1   APTT 41.3* 33.3*       Microbiology Results (last 7 days)       Procedure Component Value Units Date/Time    Blood culture [[id number]] Collected: 04/28/22 1201    Order Status: Completed Specimen: Blood from Peripheral, Forearm, Right Updated: 05/01/22 1303     Blood Culture, Routine No Growth to date      No Growth to date      No Growth to date      No Growth to date    Blood culture [[id number]] Collected: 04/28/22 1201    Order Status: Completed  Specimen: Blood from Peripheral, Hand, Left Updated: 05/01/22 1303     Blood Culture, Routine No Growth to date      No Growth to date      No Growth to date      No Growth to date          All pertinent labs from the last 24 hours have been reviewed.    Significant Diagnostics:    Imaging Results              SURG FL Surgery Fluoro Usage (Final result)  Result time 04/28/22 19:52:20      Final result by Access, Silent  (04/28/22 19:52:20)                   Narrative:    See OP Notes for results.     IMPRESSION: See OP Notes for results.             This procedure was auto-finalized by: Virtual Radiologist                                      MRI Cervical Spine W WO Cont (Final result)  Result time 04/28/22 11:30:13   Procedure changed from MRI Cervical Spine With Contrast     Final result by Rhett Conway MD (04/28/22 11:30:13)                   Impression:      1. Abnormal nonspecific posterior epidural fluid, differential diagnosis includes hematoma and abscess.  This in conjunction with juxta-articular posterior soft tissue edema particularly on left and inter spinous process region, superimposed muscle strain posttraumatic change favored in may be responsible for the posterior epidural fluid which could represent hematoma.  No osteomyelitis or septic facet joint disease or discitis or acute fracture noted on CTA neck exam.  2. Prominent degenerative disc spondylosis facet joint arthropathy changes particularly C3 through C6 levels.  3.  This report was flagged in Epic as abnormal.  4. Neuro surgical consultation suggested as well as clinical correlation.    COMMUNICATION  This critical result was discovered/received at 11:15 04/28/2022.  The critical information above was relayed directly by me by telephone to Dr Bay on 04/28/2022 at 1229 hours.      Electronically signed by: Rhett Conway MD  Date:    04/28/2022  Time:    11:30               Narrative:    EXAMINATION:  MRI CERVICAL  SPINE W WO CONTRAST    CLINICAL HISTORY:  cervical spine extending from C3-C6 that may be artifact however the appearance is most concerning for the possibility of an intraspinal epidural process to include the possibility of epidural fluid collection, abscess or hematoma;    TECHNIQUE:  MRI cervical spine without and with intravenous Gadavist 8 cc.    COMPARISON:  CT and MRI imaging of brain, cervical spine dating back 2014.    FINDINGS:  Levoscoliosis cervicothoracic junction with tilting head right.  DJD chronic change anterior C1-C2 with mild hypertrophy transverse ligament and mild indentation adjacent cranial cervical junction.  Marrow space normal.  Degenerative disc spondylosis particularly to C4 through C6.  No fracture subluxation.    Posterior juxta-articular edema noted about left facet joints from C2 through C7 levels as well as between posterior spinous processes, limited similar juxta-articular edema right posterior lateral facet joint structures.  Abnormality right upper lobe suprahilar region, please refer to CTA chest same day report.    Posterior epidural fluid noted to extend from C2-C3-T1 level with isointense signal spinal cord on T1, heterogeneous increased signal on T2 and FLAIR and limited enhancement of margins on post contrasted exam.  Study obscured by motion artifact particularly postcontrast exam.  Overall exam is of satisfactory diagnostic quality.    C2-C3 posterior disc normal, facet joint arthropathy with mild moderate right foramen and no left foramen and mild spinal stenosis.    C3-C4 mild moderate posterior central disc desiccated bulge, mild compression anterior spinal cord, moderate soft tissue spinal stenosis in conjunction with posterior epidural compression of spinal cord mild moderate degree.    C4-C5 mild moderate posterior disc bulge spondylosis, mild compression anterior spinal cord, severe compression posterior spinal cord by epidural fluid particularly on right, facet  joint arthropathy with mild moderate foraminal and moderate severe soft tissues spinal stenosis.    C5-C6 mild moderate posterior disc bulge spondylosis, paracentral prominent left, mild moderate compression anterior spinal cord, moderate compression posterior spinal cord by epidural fluid, facet joint arthropathy with mild moderate foramen stenosis particularly on left, moderate soft tissue spinal stenosis.    C6-C7 mild moderate posterior disc bulge spondylosis, posterior central annular fissure, moderate compression anterior spinal cord, additional mild moderate compression posterior spinal cord by a epidural fluid, facet joint arthropathy, mild moderate foraminal stenosis more prominent on left, moderate soft tissue spinal stenosis.    C7-T1 posterior disc margin normal.  Some hypertrophy posterior spinal ligament, mild moderate compression posterior spinal sac, mild moderate soft tissue spinal stenosis.    Posterior spinal fluid collection extends to level of T1-T2 with mild moderate compression posterior spinal sac and mild moderate spinal stenosis.  Upper dorsal spinal cord and spinal canal otherwise appears intact.    No additional abnormal intra-axial extra-axial enhancement post-contrast exam.  No definite abnormal signal within spinal cord.                                       MRI Brain W WO Contrast (Final result)  Result time 04/28/22 10:49:28   Procedure changed from MRI Brain Without Contrast     Final result by Rhett Conway MD (04/28/22 10:49:28)                   Impression:      New metal opacities within posterior frontal high convexity scalp, perhaps representing post laceration therapy and clinical correlation requested.    MRI brain otherwise stable and unchanged.  No new stroke hemorrhage or subdural fluid.    Numerous areas of supratentorial gliosis.      Electronically signed by: Rhett Conway MD  Date:    04/28/2022  Time:    10:49               Narrative:     EXAMINATION:  MRI BRAIN W WO CONTRAST    CLINICAL HISTORY:  right sided weakness;    TECHNIQUE:  Multiplanar multisequence MR imaging of the brain was performed before and after the administration of 8 mL Gadavist intravenous contrast.    COMPARISON:  CTA head and neck, CT head same day and MRI CT brain studies dating back 2021 and CT soft tissue neck 2016    FINDINGS:  New metal scalp artifacts posterior frontal high convexity appearing since CT same day 03:50, could represent post laceration therapy and clinical correlation requested.    Central and cortical atrophy remains prominent particularly left posterior frontal lobe cortex.    Numerous scattered foci deep subcortical white matter gliosis particularly frontal and lesser degree parietooccipital more prominent on right, involvement of upper basal ganglia central semi ovale, subinsular cortex again more prominent on right.  Small foci increased signals cerebral peduncle and possibly vague area of linea are subtle gliosis beneath floor 4th ventricle.  Diffusion and gradient echo studies negative.    No new intra-axial or extra-axial enhancement of brain, hemorrhage, subdural fluid, mass or acute stroke.    Nasal septal deviation spur projects left with some mucosal hypertrophy of ethmoid sinuses.  Distal vertebrobasilar distal internal carotid arteries and major branches patent flow void.                                        CTA Head and Neck (xpd) (Final result)  Result time 04/28/22 05:38:06      Final result by Rigoberto De Jesus MD (04/28/22 05:38:06)                   Impression:      The major intracranial and extracranial arterial vascular structures of the head and neck demonstrate no evidence for high-grade stenosis or occlusion, or dissection, and there is no evidence for intracranial aneurysm or AVM.    There is a finding of for a to the cervical spine extending from C3-C6 that may be artifact however the appearance is most concerning for the  possibility of an intraspinal epidural process to include the possibility of epidural fluid collection, abscess or hematoma, clinical and historical correlation is needed, MRI examination of the cervical spine with contrast is recommended.    This report was flagged in Epic as abnormal.    The findings were reported to Dr Joyce in the ER at the time of dictation.      Electronically signed by: Rigoberto De Jesus  Date:    04/28/2022  Time:    05:38               Narrative:    EXAMINATION:  CTA HEAD AND NECK (XPD)    CLINICAL HISTORY:  Neuro deficit, acute, stroke suspected;    TECHNIQUE:  Non contrast low dose axial images were obtained through the head.  CT angiogram was performed from the level of the nae to the top of the head following the IV administration of 100mL of Omnipaque 350.   Sagittal and coronal reconstructions and maximum intensity projection reconstructions were performed. Arterial stenosis percentages are based on NASCET measurement criteria.    COMPARISON:  CT examination of the brain without contrast April 28, 2022, CT examination of the chest without contrast April 28, 2022    FINDINGS:  The vertebral arteries bilaterally demonstrate appropriate opacification extending to the level of the basilar artery which also demonstrates appropriate opacification.  The P1 segment of the posterior cerebral artery bilaterally appears hypoplastic, and there appear to be bilateral posterior communicating arteries, otherwise the PCA bilaterally demonstrate appropriate opacification.    The common, external and internal carotid arteries bilaterally demonstrate appropriate opacification, the internal carotid arteries demonstrate opacification to the level of the [id number].  There is no evidence for high-grade stenosis or occlusion or dissection of the internal carotid arteries.  The anterior cerebral artery and middle cerebral artery bilaterally demonstrate appropriate opacification.  There is suggestion  of a thin small anterior communicating artery.    There is no evidence for intracranial aneurysm or AVM.  The intracranial venous structures appear appropriate.    The visualized orbits appear appropriate.  The paranasal sinuses and mastoid air cells appear appropriate.  The parapharyngeal space bilaterally appears clear, there is no evidence for retropharyngeal abscess.  There is no abnormal thickening of the epiglottis.  The parotid, submandibular and thyroid glands appear appropriate.  The visualized osseous structures demonstrate chronic change.  Findings referable to the lung apices are discussed on the CT examination of the chest of the same date, referral to the chest CT report is recommended.    As best seen on axial series 3 and axial series 2 along the posterior aspect of the spinal canal, and appearing to extend to the right there is subtle suggestion of accentuated attenuation, along the cervical spinal canal extending from approximately C3 to C6.  This is also suggested on sagittal series 602.  This may be artifactual however an intraspinal epidural process to include epidural fluid collection, including the possibility of intraspinal epidural hematoma or abscess cannot be excluded based upon the appearance.  MRI examination of the cervical spine with contrast is recommended for further evaluation.                                        CTA Chest Abdomen Pelvis (Final result)  Result time 04/28/22 05:38:52   Procedure changed from CTA Chest Abdomen Non Coronary     Final result by Rigoberto De Jesus MD (04/28/22 05:38:52)                   Impression:      The thoracic and abdominal aorta demonstrate no evidence for aneurysmal dilatation, aortic dissection or acute aortic injury.    Intrathoracic findings appears stable when compared to the CT examination of the chest of the same date, referral to the chest CT of the same date report is recommended.    Diverticula of the colon are noted in there is a  segment of the sigmoid colon demonstrating mild wall thickening, this may relate to concentric muscular hypertrophy of diverticulosis however correlation for mild diverticulitis is needed, if the patient has not had recent colon screening exam I would recommend follow-up colonoscopy or fluoroscopic barium examination to exclude neoplasm.    Irregular contour of the liver again noted, correlation for chronic hepatic disease is needed.    Cholelithiasis is noted, there is no evidence for pericholecystic inflammatory change.    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    This report was flagged in Epic as abnormal.    The findings were reported to Dr Joyce in the ER at the time of dictation.      Electronically signed by: Rigoberto De Jesus  Date:    04/28/2022  Time:    05:38               Narrative:    EXAMINATION:  CTA CHEST ABDOMEN PELVIS    CLINICAL HISTORY:  Aortic dissection suspected;    TECHNIQUE:  CT examination of the chest abdomen and pelvis was performed via aortic angiographic protocol after the administration of 100 mL Omnipaque 350 intravenous contrast, timing of imaging optimized for evaluation of the aorta.  Axial imaging, sagittal and coronal reconstruction imaging is submitted.  Axial MIP imaging ting of the chest is submitted.    COMPARISON:  CT examination of the chest without contrast April 28, 2022, CTA examination chest abdomen and pelvis October 15, 2018    FINDINGS:  The thoracic and abdominal aorta demonstrate appropriate opacification, there is no evidence for acute aortic leak, aortic dissection, or aneurysmal dilatation and no evidence for periaortic hematoma.  The visualized great vessels appear appropriate.  The pulmonary arterial vasculature appear appropriate.  The celiac artery, hepatic artery, and splenic artery demonstrate appropriate opacification as does the superior mesenteric artery which demonstrates mild narrowing at its origin without  high-grade stenosis.  The renal arteries, inferior mesenteric arteries and iliac arterial vasculature appear appropriate.    Intrathoracic findings including cavitary lesion and abnormal areas of multifocal opacity and nodularity of the right lung appear stable when compared to the prior examination of the same date, the left hemithorax appears stable as well, referral to the earlier chest CT report is recommended.  Mediastinal adenopathy is again noted.  There is no pericardial effusion, there is no hemopericardium, there is no hemothorax.  There is no pneumothorax.    Diminished attenuation of the liver consistent with diffuse fatty infiltrate is noted, irregular contour of the liver again noted, correlation for chronic hepatic disease is needed.  Cholelithiasis is noted, there is no evidence for pericholecystic or peripancreatic inflammatory change.  The stomach appears unremarkable for degree of distention.  There is no evidence for acute process of the spleen or adrenal glands.  There is no hydronephrosis or obstructive uropathy bilaterally.  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  The urinary bladder appears unremarkable for degree of distention.    There is no evidence for small bowel obstructive process.  The appendix is identified, it does not appear inflamed.  Mild prominence of the colon with stool is noted.  Diverticula of the colon are noted.  There is a segment of the sigmoid colon as best seen on axial series 2, images 770 through 809 that demonstrates mild wall thickening, this may relate to concentric muscular hypertrophy of diverticulosis, as significant pericolonic inflammatory change is not appreciated however correlation for signs or symptoms of mild colitis or diverticulitis is needed, if the patient has not had recent colon screening exam I would recommend follow-up colonoscopy or fluoroscopic barium exam to exclude the  possibility of neoplasm.  There is no additional evidence for colonic inflammatory or obstructive change.  There is no free intraperitoneal air.  The visualized osseous structures demonstrate chronic change.                                       CT Chest Without Contrast (Final result)  Result time 04/28/22 04:26:44      Final result by Rigoberto De Jesus MD (04/28/22 04:26:44)                   Impression:      Previously identified right upper lobe cavitary lung lesion again noted, diminishing size, in addition there are multifocal areas of pulmonary opacity, areas of consolidation, airspace disease, and nodularity, involving the right hemithorax, overall demonstrating mild improvement.    Chronic lung changes bilaterally are again noted, and the left hemithorax appears stable.    Mediastinal adenopathy appears stable.    Findings referable to the liver for which correlation for chronic hepatic disease is needed again noted.    Cholelithiasis, there is no evidence for pericholecystic inflammatory change.    Mild pericardial thickening or fluid anteriorly, without large pericardial effusion.      Electronically signed by: Rigoberto De Jesus  Date:    04/28/2022  Time:    04:26               Narrative:    EXAMINATION:  CT CHEST WITHOUT CONTRAST    CLINICAL HISTORY:  back pain, neurologic deficits;    TECHNIQUE:  Low dose axial images, sagittal and coronal reformations were obtained from the thoracic inlet to the lung bases. Contrast was not administered.  The lack of intravenous contrast diminishes the sensitivity of the examination.    COMPARISON:  CT examination of the chest without contrast November 2, 2021    FINDINGS:  The previously identified cavitary lesion of the right upper lobe is again noted, it appears diminished in size when compared to the prior examination, when measured at a similar level to the prior study it measures approximately 1.7 by 3.4 cm on axial imaging compared to 3.3 x 4.4 cm on the prior  exam.  There is appearance consistent with bronchial communication to the cavitary lesion, appearing similar to the prior exam.  This is somewhat more conspicuous at this time.  There is continued appearance of surrounding patchy and confluent and nodular opacities with multifocal airspace opacities and of the right lung, the overall pattern and distribution is similar to the prior study, however the overall appearance is that of improvement.    Chronic appearing lung changes are again noted.  There are mild atelectatic changes noted as well.  The left hemithorax appears stable.  There is no evidence for significant pleural effusion and there is no evidence for pneumothorax.    Mediastinal adenopathy is again noted appearing stable.  There is mild greater thickening or fluid along the pericardium anteriorly.  Otherwise the appearance of the heart and great vessels is stable, evaluation is limited on this noncontrast exam.    Limited imaging of the upper abdomen demonstrates evidence for cholelithiasis without evidence for pericholecystic inflammatory change.  Irregular contour of the liver again noted, correlation for chronic hepatic disease is needed.  The visualized osseous structures appear intact.  Chronic changes are noted.                                       CT Head Without Contrast (Final result)  Result time 04/28/22 04:07:27      Final result by Rigoberto De Jesus MD (04/28/22 04:07:27)                   Impression:      Chronic changes are noted, there is no evidence for superimposed acute intracranial process.      Electronically signed by: Rigoberto De Jesus  Date:    04/28/2022  Time:    04:07               Narrative:    EXAMINATION:  CT HEAD WITHOUT CONTRAST    CLINICAL HISTORY:  Neuro deficit, acute, stroke suspected;    TECHNIQUE:  Low dose axial images were obtained through the head.  Coronal and sagittal reformations were also performed. Contrast was not administered.    COMPARISON:  CT examination  of the brain without contrast February 2, 2021    FINDINGS:  The ventricular system, sulcal pattern and parenchymal attenuation characteristics are consistent with chronic change.  Involutional changes noted, chronic appearing white matter change noted.  There is no evidence for superimposed acute process.  There is no evidence for intracranial mass, mass effect or midline shift, there is no evidence for acute intracranial hemorrhage.  Appropriate CSF spaces are seen at the skull base.    The visualized orbits appear intact.  The mastoid air cells and middle ear cavity bilaterally appear appropriate, as do the paranasal sinuses.  The visualized osseous structures appear intact.                                        Physical Exam    Assessment/Plan:     * Epidural hematoma  80 y/o male with APS on coumadin, hx PE, MAC pneumonia, HLD with spontaneous C3-C6 epidural hematoma compressing the spinal cord    S/p  C3-C6 laminectomy and fusion for evacuation of epidural hematoma 4/28    Plan:  NCC Q1 h neurochecks, delirium precautions  All imaing personally reviewed   -- CTH 4/29 no acute intracranial process   -- Post op MRI C spine satisfactory decompression with continued cord signal edema   --postop XR C spine hardware in good position  -- c collar on at all times  -- HV drains removed 4/30  -- MAP>80 through weekend  -- Dex 4q6  Na management as medical team                 North Perry MD  Neurosurgery  [facility]

## 2022-05-01 NOTE — NURSING
Pt put out another 800ml of urine within the past 1 hour. ELISA Mendez notified. Rechecking sodium.

## 2022-05-01 NOTE — NURSING
Pt put out a liter of urine within the past hour. ELISA Mendez notified. Orders for 1 500ml bolus of LR.

## 2022-05-01 NOTE — SUBJECTIVE & OBJECTIVE
Review of Systems    Constitutional: Denies fevers, weight loss, chills, or weakness.  Eyes: Denies changes in vision.  ENT: Denies dysphagia, nasal discharge, ear pain or discharge.  Cardiovascular: Denies chest pain, palpitations, orthopnea, or claudication.  Respiratory: Denies shortness of breath, cough, hemoptysis, or wheezing.  GI: Denies nausea/vomitting, hematochezia, melena, abd pain, or changes in appetite.  : Denies dysuria, incontinence, or hematuria.  Musculoskeletal: Denies joint pain or myalgias.  Skin/breast: Denies rashes, lumps, lesions, or discharge.  Neurologic: Denies headache, dizziness, vertigo, or paresthesias.  Psychiatric: Denies changes in mood or hallucinations.  Endocrine: Denies polyuria, polydipsia, heat/cold intolerance.  Hematologic/Lymph: Denies lymphadenopathy, easy bruising or easy bleeding.  Allergic/Immunologic: Denies rash, rhinitis.    Objective:     Vitals:  Temp: 98.6 °F (37 °C)  Pulse: (!) 55  Rhythm: normal sinus rhythm  BP: (!) 110/55  MAP (mmHg): 78  Resp: 18  SpO2: 100 %  O2 Device (Oxygen Therapy): nasal cannula    Temp  Min: 97.7 °F (36.5 °C)  Max: 98.6 °F (37 °C)  Pulse  Min: 45  Max: 83  BP  Min: 110/55  Max: 173/82  MAP (mmHg)  Min: 78  Max: 116  Resp  Min: 9  Max: 25  SpO2  Min: 85 %  Max: 100 %  Oxygen Concentration (%)  Min: 2  Max: 2    04/30 0701 - 05/01 0700  In: 4667.8 [I.V.:3044.8]  Out: 5625 [Urine:5525; Drains:100]   Unmeasured Output  Urine Occurrence: 0  Stool Occurrence: 0       Physical Exam  GA: Alert, comfortable, no acute distress.   HEENT: No scleral icterus or JVD.   Pulmonary: Clear to auscultation A/L.   Cardiac: RRR S1 & S2 w/o rubs/murmurs/gallops.   Abdominal: Bowel sounds present x 4. No appreciable hepatosplenomegaly.  Skin: No jaundice, rashes, or visible lesions.  Blood clots at urethral meatus   Neuro:  --GCS: E4 V4 M6  --Mental Status:  opens eyes, delayed responses, tracks, oriented to self only  --CN II-XII grossly intact.    --Pupils 3mm, PERRL.   --Corneal reflex, gag, cough intact.  --LIU spont, BLE unable hold against gravity, RUE weak    Medications:  Continuous   Scheduledacetaminophen, 1,000 mg, Q8H  albuterol-ipratropium, 3 mL, Q6H  azithromycin, 500 mg, Daily  DAPTOmycin (CUBICIN)  IV, 8 mg/kg, Q24H  dexamethasone, 4 mg, Q6H  ethambutoL, 800 mg, Daily  famotidine, 20 mg, BID  heparin (porcine), 5,000 Units, Q8H  methocarbamoL, 500 mg, Q6H  mupirocin, , BID  polyethylene glycol, 17 g, Daily  pravastatin, 10 mg, Daily  pregabalin, 50 mg, TID  QUEtiapine, 25 mg, QHS  senna-docusate 8.6-50 mg, 1 tablet, BID  silodosin, 4 mg, Daily  sodium chloride 3%, 4 mL, BID  PRNacetaminophen, 650 mg, Q6H PRN  calcium gluconate IVPB, 1 g, PRN  calcium gluconate IVPB, 2 g, PRN  calcium gluconate IVPB, 3 g, PRN  dextrose 10%, 12.5 g, PRN  dextrose 10%, 25 g, PRN  glucagon (human recombinant), 1 mg, PRN  insulin aspart U-100, 0-10 Units, Q6H PRN  magnesium sulfate IVPB, 2 g, PRN  magnesium sulfate IVPB, 4 g, PRN  melatonin, 6 mg, Nightly PRN  metoclopramide HCl, 5 mg, Q6H PRN  ondansetron, 4 mg, Q8H PRN  oxyCODONE, 5 mg, Q6H PRN  potassium chloride, 40 mEq, PRN   And  potassium chloride, 60 mEq, PRN   And  potassium chloride, 80 mEq, PRN  sodium chloride 0.9%, 10 mL, PRN  sodium chloride 0.9%, 10 mL, PRN    Today I personally reviewed pertinent medications, lines/drains/airways, imaging, cardiology results, laboratory results, microbiology results, MRI pending    Diet  Diet Dysphagia Pureed (IDDSI Level 4) Nectar Thick

## 2022-05-02 ENCOUNTER — PES CALL (OUTPATIENT)
Dept: ADMINISTRATIVE | Facility: CLINIC | Age: 82
End: 2022-05-02
Payer: MEDICARE

## 2022-05-02 LAB
ABO + RH BLD: NORMAL
ALBUMIN SERPL BCP-MCNC: 3.2 G/DL (ref 3.5–5.2)
ALP SERPL-CCNC: 50 U/L (ref 55–135)
ALT SERPL W/O P-5'-P-CCNC: 27 U/L (ref 10–44)
ANION GAP SERPL CALC-SCNC: 8 MMOL/L (ref 8–16)
APTT BLDCRRT: 33.3 SEC (ref 21–32)
AST SERPL-CCNC: 35 U/L (ref 10–40)
BACTERIA BLD CULT: NORMAL
BACTERIA BLD CULT: NORMAL
BASOPHILS # BLD AUTO: 0.01 K/UL (ref 0–0.2)
BASOPHILS NFR BLD: 0.1 % (ref 0–1.9)
BILIRUB SERPL-MCNC: 0.4 MG/DL (ref 0.1–1)
BLD GP AB SCN CELLS X3 SERPL QL: NORMAL
BLD PROD TYP BPU: NORMAL
BLD PROD TYP BPU: NORMAL
BLOOD UNIT EXPIRATION DATE: NORMAL
BLOOD UNIT EXPIRATION DATE: NORMAL
BLOOD UNIT TYPE CODE: 9500
BLOOD UNIT TYPE CODE: 9500
BLOOD UNIT TYPE: NORMAL
BLOOD UNIT TYPE: NORMAL
BUN SERPL-MCNC: 14 MG/DL (ref 8–23)
CALCIUM SERPL-MCNC: 8.4 MG/DL (ref 8.7–10.5)
CHLORIDE SERPL-SCNC: 111 MMOL/L (ref 95–110)
CO2 SERPL-SCNC: 27 MMOL/L (ref 23–29)
CODING SYSTEM: NORMAL
CODING SYSTEM: NORMAL
CREAT SERPL-MCNC: 0.7 MG/DL (ref 0.5–1.4)
DIFFERENTIAL METHOD: ABNORMAL
DISPENSE STATUS: NORMAL
DISPENSE STATUS: NORMAL
EOSINOPHIL # BLD AUTO: 0 K/UL (ref 0–0.5)
EOSINOPHIL NFR BLD: 0 % (ref 0–8)
ERYTHROCYTE [DISTWIDTH] IN BLOOD BY AUTOMATED COUNT: 15.5 % (ref 11.5–14.5)
EST. GFR  (AFRICAN AMERICAN): >60 ML/MIN/1.73 M^2
EST. GFR  (NON AFRICAN AMERICAN): >60 ML/MIN/1.73 M^2
GLUCOSE SERPL-MCNC: 186 MG/DL (ref 70–110)
HCT VFR BLD AUTO: 29.5 % (ref 40–54)
HGB BLD-MCNC: 9.7 G/DL (ref 14–18)
IMM GRANULOCYTES # BLD AUTO: 0.11 K/UL (ref 0–0.04)
IMM GRANULOCYTES NFR BLD AUTO: 0.8 % (ref 0–0.5)
INR PPP: 1.1 (ref 0.8–1.2)
LYMPHOCYTES # BLD AUTO: 1.2 K/UL (ref 1–4.8)
LYMPHOCYTES NFR BLD: 8.8 % (ref 18–48)
MAGNESIUM SERPL-MCNC: 2.1 MG/DL (ref 1.6–2.6)
MCH RBC QN AUTO: 27.6 PG (ref 27–31)
MCHC RBC AUTO-ENTMCNC: 32.9 G/DL (ref 32–36)
MCV RBC AUTO: 84 FL (ref 82–98)
MONOCYTES # BLD AUTO: 1.2 K/UL (ref 0.3–1)
MONOCYTES NFR BLD: 8.9 % (ref 4–15)
NEUTROPHILS # BLD AUTO: 10.7 K/UL (ref 1.8–7.7)
NEUTROPHILS NFR BLD: 81.4 % (ref 38–73)
NRBC BLD-RTO: 0 /100 WBC
PHOSPHATE SERPL-MCNC: 1.7 MG/DL (ref 2.7–4.5)
PLATELET # BLD AUTO: 201 K/UL (ref 150–450)
PMV BLD AUTO: 10.1 FL (ref 9.2–12.9)
POCT GLUCOSE: 143 MG/DL (ref 70–110)
POCT GLUCOSE: 207 MG/DL (ref 70–110)
POTASSIUM SERPL-SCNC: 3.5 MMOL/L (ref 3.5–5.1)
PROT SERPL-MCNC: 5.5 G/DL (ref 6–8.4)
PROTHROMBIN TIME: 11.8 SEC (ref 9–12.5)
RBC # BLD AUTO: 3.52 M/UL (ref 4.6–6.2)
SODIUM SERPL-SCNC: 146 MMOL/L (ref 136–145)
TRANS ERYTHROCYTES VOL PATIENT: NORMAL ML
TRANS ERYTHROCYTES VOL PATIENT: NORMAL ML
WBC # BLD AUTO: 13.19 K/UL (ref 3.9–12.7)

## 2022-05-02 PROCEDURE — 25000242 PHARM REV CODE 250 ALT 637 W/ HCPCS: Performed by: NURSE PRACTITIONER

## 2022-05-02 PROCEDURE — 80053 COMPREHEN METABOLIC PANEL: CPT | Performed by: PHYSICIAN ASSISTANT

## 2022-05-02 PROCEDURE — 85025 COMPLETE CBC W/AUTO DIFF WBC: CPT | Performed by: PHYSICIAN ASSISTANT

## 2022-05-02 PROCEDURE — 85610 PROTHROMBIN TIME: CPT | Performed by: PHYSICIAN ASSISTANT

## 2022-05-02 PROCEDURE — 25000003 PHARM REV CODE 250: Performed by: PSYCHIATRY & NEUROLOGY

## 2022-05-02 PROCEDURE — 97530 THERAPEUTIC ACTIVITIES: CPT

## 2022-05-02 PROCEDURE — 93005 ELECTROCARDIOGRAM TRACING: CPT

## 2022-05-02 PROCEDURE — 99900035 HC TECH TIME PER 15 MIN (STAT)

## 2022-05-02 PROCEDURE — 84100 ASSAY OF PHOSPHORUS: CPT | Performed by: PHYSICIAN ASSISTANT

## 2022-05-02 PROCEDURE — 97116 GAIT TRAINING THERAPY: CPT

## 2022-05-02 PROCEDURE — 94761 N-INVAS EAR/PLS OXIMETRY MLT: CPT

## 2022-05-02 PROCEDURE — 25000003 PHARM REV CODE 250: Performed by: NURSE PRACTITIONER

## 2022-05-02 PROCEDURE — 27000221 HC OXYGEN, UP TO 24 HOURS

## 2022-05-02 PROCEDURE — 63600175 PHARM REV CODE 636 W HCPCS: Performed by: PHYSICIAN ASSISTANT

## 2022-05-02 PROCEDURE — 63600175 PHARM REV CODE 636 W HCPCS: Performed by: STUDENT IN AN ORGANIZED HEALTH CARE EDUCATION/TRAINING PROGRAM

## 2022-05-02 PROCEDURE — 94640 AIRWAY INHALATION TREATMENT: CPT

## 2022-05-02 PROCEDURE — 63600175 PHARM REV CODE 636 W HCPCS: Performed by: PSYCHIATRY & NEUROLOGY

## 2022-05-02 PROCEDURE — 27000646 HC AEROBIKA DEVICE

## 2022-05-02 PROCEDURE — 93010 ELECTROCARDIOGRAM REPORT: CPT | Mod: ,,, | Performed by: INTERNAL MEDICINE

## 2022-05-02 PROCEDURE — 93010 EKG 12-LEAD: ICD-10-PCS | Mod: ,,, | Performed by: INTERNAL MEDICINE

## 2022-05-02 PROCEDURE — 25000003 PHARM REV CODE 250: Performed by: PHYSICIAN ASSISTANT

## 2022-05-02 PROCEDURE — 92507 TX SP LANG VOICE COMM INDIV: CPT

## 2022-05-02 PROCEDURE — 99233 PR SUBSEQUENT HOSPITAL CARE,LEVL III: ICD-10-PCS | Mod: 95,,, | Performed by: PSYCHIATRY & NEUROLOGY

## 2022-05-02 PROCEDURE — 25000003 PHARM REV CODE 250: Performed by: HOSPITALIST

## 2022-05-02 PROCEDURE — 86850 RBC ANTIBODY SCREEN: CPT | Performed by: PHYSICIAN ASSISTANT

## 2022-05-02 PROCEDURE — 25000003 PHARM REV CODE 250: Performed by: STUDENT IN AN ORGANIZED HEALTH CARE EDUCATION/TRAINING PROGRAM

## 2022-05-02 PROCEDURE — 97112 NEUROMUSCULAR REEDUCATION: CPT

## 2022-05-02 PROCEDURE — 92526 ORAL FUNCTION THERAPY: CPT

## 2022-05-02 PROCEDURE — 97110 THERAPEUTIC EXERCISES: CPT

## 2022-05-02 PROCEDURE — 83735 ASSAY OF MAGNESIUM: CPT | Performed by: PHYSICIAN ASSISTANT

## 2022-05-02 PROCEDURE — 86900 BLOOD TYPING SEROLOGIC ABO: CPT | Performed by: PHYSICIAN ASSISTANT

## 2022-05-02 PROCEDURE — 85730 THROMBOPLASTIN TIME PARTIAL: CPT | Performed by: PHYSICIAN ASSISTANT

## 2022-05-02 PROCEDURE — 63700000 PHARM REV CODE 250 ALT 637 W/O HCPCS: Performed by: NURSE PRACTITIONER

## 2022-05-02 PROCEDURE — 99233 SBSQ HOSP IP/OBS HIGH 50: CPT | Mod: 95,,, | Performed by: PSYCHIATRY & NEUROLOGY

## 2022-05-02 PROCEDURE — 25000242 PHARM REV CODE 250 ALT 637 W/ HCPCS: Performed by: PHYSICIAN ASSISTANT

## 2022-05-02 PROCEDURE — 94664 DEMO&/EVAL PT USE INHALER: CPT

## 2022-05-02 PROCEDURE — 94668 MNPJ CHEST WALL SBSQ: CPT

## 2022-05-02 PROCEDURE — 11000001 HC ACUTE MED/SURG PRIVATE ROOM

## 2022-05-02 RX ORDER — DEXAMETHASONE SODIUM PHOSPHATE 4 MG/ML
4 INJECTION, SOLUTION INTRA-ARTICULAR; INTRALESIONAL; INTRAMUSCULAR; INTRAVENOUS; SOFT TISSUE EVERY 8 HOURS
Status: DISCONTINUED | OUTPATIENT
Start: 2022-05-02 | End: 2022-05-04

## 2022-05-02 RX ORDER — SODIUM CHLORIDE 0.9 % (FLUSH) 0.9 %
10 SYRINGE (ML) INJECTION
Status: DISCONTINUED | OUTPATIENT
Start: 2022-05-02 | End: 2022-05-03

## 2022-05-02 RX ORDER — SODIUM,POTASSIUM PHOSPHATES 280-250MG
2 POWDER IN PACKET (EA) ORAL
Status: DISCONTINUED | OUTPATIENT
Start: 2022-05-02 | End: 2022-05-03

## 2022-05-02 RX ADMIN — AZITHROMYCIN MONOHYDRATE 500 MG: 250 TABLET ORAL at 08:05

## 2022-05-02 RX ADMIN — MUPIROCIN: 20 OINTMENT TOPICAL at 09:05

## 2022-05-02 RX ADMIN — IPRATROPIUM BROMIDE AND ALBUTEROL SULFATE 3 ML: 2.5; .5 SOLUTION RESPIRATORY (INHALATION) at 08:05

## 2022-05-02 RX ADMIN — HEPARIN SODIUM 5000 UNITS: 5000 INJECTION INTRAVENOUS; SUBCUTANEOUS at 01:05

## 2022-05-02 RX ADMIN — DEXAMETHASONE SODIUM PHOSPHATE 4 MG: 4 INJECTION INTRA-ARTICULAR; INTRALESIONAL; INTRAMUSCULAR; INTRAVENOUS; SOFT TISSUE at 01:05

## 2022-05-02 RX ADMIN — POTASSIUM & SODIUM PHOSPHATES POWDER PACK 280-160-250 MG 2 PACKET: 280-160-250 PACK at 01:05

## 2022-05-02 RX ADMIN — DEXAMETHASONE SODIUM PHOSPHATE 4 MG: 4 INJECTION INTRA-ARTICULAR; INTRALESIONAL; INTRAMUSCULAR; INTRAVENOUS; SOFT TISSUE at 05:05

## 2022-05-02 RX ADMIN — SODIUM CHLORIDE SOLN NEBU 3% 4 ML: 3 NEBU SOLN at 08:05

## 2022-05-02 RX ADMIN — IPRATROPIUM BROMIDE AND ALBUTEROL SULFATE 3 ML: 2.5; .5 SOLUTION RESPIRATORY (INHALATION) at 01:05

## 2022-05-02 RX ADMIN — QUETIAPINE FUMARATE 25 MG: 25 TABLET ORAL at 06:05

## 2022-05-02 RX ADMIN — DEXAMETHASONE SODIUM PHOSPHATE 4 MG: 4 INJECTION INTRA-ARTICULAR; INTRALESIONAL; INTRAMUSCULAR; INTRAVENOUS; SOFT TISSUE at 08:05

## 2022-05-02 RX ADMIN — POTASSIUM & SODIUM PHOSPHATES POWDER PACK 280-160-250 MG 2 PACKET: 280-160-250 PACK at 03:05

## 2022-05-02 RX ADMIN — PREGABALIN 50 MG: 50 CAPSULE ORAL at 08:05

## 2022-05-02 RX ADMIN — METHOCARBAMOL TABLETS 500 MG: 500 TABLET, COATED ORAL at 05:05

## 2022-05-02 RX ADMIN — ETHAMBUTOL HYDROCHLORIDE 800 MG: 400 TABLET, FILM COATED ORAL at 09:05

## 2022-05-02 RX ADMIN — METHOCARBAMOL TABLETS 500 MG: 500 TABLET, COATED ORAL at 06:05

## 2022-05-02 RX ADMIN — SENNOSIDES AND DOCUSATE SODIUM 1 TABLET: 50; 8.6 TABLET ORAL at 08:05

## 2022-05-02 RX ADMIN — IPRATROPIUM BROMIDE AND ALBUTEROL SULFATE 3 ML: 2.5; .5 SOLUTION RESPIRATORY (INHALATION) at 12:05

## 2022-05-02 RX ADMIN — FAMOTIDINE 20 MG: 20 TABLET ORAL at 08:05

## 2022-05-02 RX ADMIN — MUPIROCIN: 20 OINTMENT TOPICAL at 08:05

## 2022-05-02 RX ADMIN — HEPARIN SODIUM 5000 UNITS: 5000 INJECTION INTRAVENOUS; SUBCUTANEOUS at 08:05

## 2022-05-02 RX ADMIN — HEPARIN SODIUM 5000 UNITS: 5000 INJECTION INTRAVENOUS; SUBCUTANEOUS at 05:05

## 2022-05-02 RX ADMIN — Medication 6 MG: at 02:05

## 2022-05-02 RX ADMIN — PRAVASTATIN SODIUM 10 MG: 10 TABLET ORAL at 11:05

## 2022-05-02 RX ADMIN — METHOCARBAMOL TABLETS 500 MG: 500 TABLET, COATED ORAL at 11:05

## 2022-05-02 RX ADMIN — POLYETHYLENE GLYCOL 3350 17 G: 17 POWDER, FOR SOLUTION ORAL at 09:05

## 2022-05-02 RX ADMIN — DAPTOMYCIN 600 MG: 500 INJECTION, POWDER, LYOPHILIZED, FOR SOLUTION INTRAVENOUS at 06:05

## 2022-05-02 RX ADMIN — SODIUM CHLORIDE, SODIUM LACTATE, POTASSIUM CHLORIDE, AND CALCIUM CHLORIDE 1000 ML: .6; .31; .03; .02 INJECTION, SOLUTION INTRAVENOUS at 01:05

## 2022-05-02 RX ADMIN — SILODOSIN 4 MG: 4 CAPSULE ORAL at 08:05

## 2022-05-02 RX ADMIN — INSULIN ASPART 2 UNITS: 100 INJECTION, SOLUTION INTRAVENOUS; SUBCUTANEOUS at 07:05

## 2022-05-02 RX ADMIN — POTASSIUM & SODIUM PHOSPHATES POWDER PACK 280-160-250 MG 2 PACKET: 280-160-250 PACK at 08:05

## 2022-05-02 NOTE — PROGRESS NOTES
[facility]  Neurosurgery  Progress Note    Subjective:     History of Present Illness: Patient is a 82 y/o male with antiphospholipid syndrome on chronic coumadin, MAC pneumonia, HLD, and left PE diagnosed January 2021 who was transferred from  for compressive epidural hematoma in the cervical spine. Patient reports that he woke up in the middle of the night and could no move the right side of his body. He went to  ED and stroke code initiated. Patient was not given tPA but given ASA. CTA was negative for acute CVA but did show compressive fluid collective from C3-C6. MRI cervical spine confirmed. Patient was transferred to [facility] ED for neurosurgical evaluation. He reports he cannot move his right arm out of the contracted position, he also cannot lift his right leg off the bed. He reports the right arm and leg are painful. No complaints on left side of body. Denies b/b dysfunction.       Post-Op Info:  Procedure(s) (LRB):  LAMINECTOMY, SPINE, CERVICAL, POSTERIOR APPROACH C3-6 (Bilateral)   4 Days Post-Op     Interval History: 5/2: NAEON. AFVSS. Subjective strength improvement. cvEEG negative for seizure.     Objective:     Weight: 74.8 kg (165 lb)  Body mass index is 24.37 kg/m².  Vital Signs (Most Recent):  Temp: 97.8 °F (36.6 °C) (05/02/22 0303)  Pulse: 67 (05/02/22 0813)  Resp: 20 (05/02/22 0813)  BP: (!) 150/71 (05/02/22 0603)  SpO2: 97 % (05/02/22 0813)   Vital Signs (24h Range):  Temp:  [97.8 °F (36.6 °C)-98.6 °F (37 °C)] 97.8 °F (36.6 °C)  Pulse:  [55-83] 67  Resp:  [9-25] 20  SpO2:  [95 %-100 %] 97 %  BP: (109-169)/(55-81) 150/71  Arterial Line BP: (107-160)/(51-88) 153/69     Date 05/02/22 0700 - 05/03/22 0659   Shift 1733-6352 4564-6855 3625-3573 24 Hour Total   INTAKE   P.O. 750   750   Shift Total(mL/kg) 750(10)   750(10)   OUTPUT   Urine(mL/kg/hr) 700   700   Shift Total(mL/kg) 700(9.4)   700(9.4)   Weight (kg) 74.8 74.8 74.8 74.8           Neurosurgery Physical  Exam  E3V4M5  Awake, confused  RUE/RLE - 4-4+ throughout  LUE/LLE - 5/5 throughout  SILT        Significant Labs:  Recent Labs   Lab 05/01/22  0232 05/01/22  0538 05/02/22  0053   *  --  186*    143 146*   K 4.4  --  3.5     --  111*   CO2 23  --  27   BUN 9  --  14   CREATININE 0.7  --  0.7   CALCIUM 8.1*  --  8.4*   MG 2.1  --  2.1       Recent Labs   Lab 04/30/22  1246 05/01/22  0232 05/02/22  0053   WBC 22.15* 15.62* 13.19*   HGB 9.9* 9.9* 9.7*   HCT 31.0* 29.9* 29.5*    208 201       Recent Labs   Lab 05/01/22 0232 05/02/22  0053   INR 1.1 1.1   APTT 33.3* 33.3*       Microbiology Results (last 7 days)       Procedure Component Value Units Date/Time    Blood culture [[id number]] Collected: 04/28/22 1201    Order Status: Completed Specimen: Blood from Peripheral, Forearm, Right Updated: 05/01/22 1303     Blood Culture, Routine No Growth to date      No Growth to date      No Growth to date      No Growth to date    Blood culture [[id number]] Collected: 04/28/22 1201    Order Status: Completed Specimen: Blood from Peripheral, Hand, Left Updated: 05/01/22 1303     Blood Culture, Routine No Growth to date      No Growth to date      No Growth to date      No Growth to date          All pertinent labs from the last 24 hours have been reviewed.    Significant Diagnostics:  No results found in the last 24 hours.      Assessment/Plan:     * Epidural hematoma  80 y/o male with APS on coumadin, hx PE, MAC pneumonia, HLD with spontaneous C3-C6 epidural hematoma compressing the spinal cord    S/p  C3-C6 laminectomy and fusion for evacuation of epidural hematoma 4/28    Plan:  Stepdown to NS, floor status  All imaing personally reviewed   -- CTH 4/29 no acute intracranial process   -- Post op MRI C spine satisfactory decompression with continued cord signal edema   --postop XR C spine hardware in good position  -- c collar on at all times  -- HV drains removed 4/30  -- d/c MAP goals  -- Dex to  4q8  -- d/c jose luis; f/u PVR  -- PT/OT  -- SW for IPR placement    Dispo: likely IPR        Donovan No MD  Neurosurgery  [facility]

## 2022-05-02 NOTE — PT/OT/SLP PROGRESS
Physical Therapy Co-Treatment    Patient Name:  Diego Gunter   MRN:  [id number]    Recommendations:     Discharge Recommendations:  rehabilitation facility   Discharge Equipment Recommendations:  (TBD)   Barriers to discharge: Increased level of assist    Assessment:     Diego Gunter is a 81 y.o. male admitted with a medical diagnosis of Epidural hematoma. Patient demonstrates improvement in global ataxia this date, able to maintain standing and participate in gait trial.     He presents with the following impairments/functional limitations: weakness, impaired endurance, impaired self care skills, impaired functional mobilty, impaired balance, gait instability, decreased safety awareness, decreased lower extremity function, decreased upper extremity function, impaired coordination, decreased ROM, impaired fine motor, impaired joint extensibility, impaired sensation, orthopedic precautions. These deficits affect their roles and responsibilities in which they were able to complete prior to admit. Diego Gunter would continue to benefit from acute PT intervention to improve quality of life and focus on recovery of impairments. Once medically stable, recommending pt discharge to Inpatient Rehabilitation Facility.    Rehab Prognosis: Good; patient continues to benefit from acute skilled PT services to address these deficits and reach maximum level of function.  Patient remains most appropriate to discharge to rehabilitation facility.  Recent Surgery: Procedure(s) (LRB):  LAMINECTOMY, SPINE, CERVICAL, POSTERIOR APPROACH C3-6 (Bilateral) 4 Days Post-Op    Plan:     During this hospitalization, patient to be seen 4 x/week to address the identified rehab impairments via gait training, therapeutic activities, therapeutic exercises, neuromuscular re-education and progress toward the following goals:    · Plan of Care Expires:  05/30/22    Subjective     Chief Complaint: None verbalized   Patient/Family  "Comments/Goals: "He's doing a lot better today"  Pain/Comfort:  · Pain Rating 1: 0/10    Objective:     Communicated with RN prior to session. Patient found HOB elevated with bed alarm, blood pressure cuff, pulse ox (continuous), telemetry, SCD, cervical collar, amaya catheter, peripheral IV, EEG, oxygen upon PT entry to room.     General Precautions: Standard, aspiration, fall   Orthopedic Precautions:spinal precautions   Braces: Cervical collar    Functional Mobility:  · Bed Mobility:     · Rolling Left:  minimum assistance  · Scooting: moderate assistance  · Supine to Sit: minimum assistance for log rolling technique  · Sit to Supine: moderate assistance of 2 persons for log rolling technique  · Transfers:     · Sit to Stand: minimum assistance of 2 persons with hand-held assist, 3 trials  · Gait: Patient ambulated 6 steps to the left with hand-held assist and moderate assistance of 2 persons. Patient demonstrates unsteady gait, decreased step length, narrow base of support, decreased foot clearance and decreased suraj. Cuing for sequencing and increased L step size. Close guarding to prevent B knee buckling All lines remained intact throughout ambulation trial, gait belt utilized.  · Balance:   · Static Sitting: Poor, able to maintain for 5 minute(s) with contact guard - moderate assistance, demonstrates left lean requiring verbal and tactile cues to orient to midline  · Dynamic Sitting: Poor: Patient unable to accept challenge or move without loss of balance, contact guard - moderate assistance  · Static Standing: Fair, able to maintain for 1 minute(s) with minimum assistance of 2 persons  · Dynamic Standing: Poor: Patient unable to accept challenge or move without loss of balance, moderate assistance of 2 persons    AM-PAC 6 CLICK MOBILITY  Turning over in bed (including adjusting bedclothes, sheets and blankets)?: 3  Sitting down on and standing up from a chair with arms (e.g., wheelchair, bedside commode, " etc.): 2  Moving from lying on back to sitting on the side of the bed?: 3  Moving to and from a bed to a chair (including a wheelchair)?: 2  Need to walk in hospital room?: 2  Climbing 3-5 steps with a railing?: 1  Basic Mobility Total Score: 13     Therapeutic Activities and Exercises:  Patient educated on role of acute care PT and PT POC and call light usage  Educated on spinal precautions including avoidance of bending, lifting, and twisting. Patient expresses understanding  Educated on log roll technique, performed to left  Whiteboard updated, Patient is clear to stand pivot transfer with RN/PCT, assist x1    Patient left HOB elevated with all lines intact, call button in reach, RN notified and significant other and family present.    GOALS:   Multidisciplinary Problems     Physical Therapy Goals        Problem: Physical Therapy    Goal Priority Disciplines Outcome Goal Variances Interventions   Physical Therapy Goal     PT, PT/OT Ongoing, Progressing     Description: Goals to be met by: 2022     Patient will increase functional independence with mobility by performin. Supine to sit with minimum assistance  2. Sit to supine with minimum assistance - met 2022  Updated: supervision while maintaining spinal precautions   3. Sit to stand transfer with minimum assistance  4. Bed to chair transfer with minimum assistance using LRAD as needed  5. Gait  x 25 feet with minimum assistance using LRAD as needed  6. Lower extremity exercise program x10 reps per handout, with independence  7. Recall 3/3 spinal precautions and how to don/doff c-collar with supervision                    Time Tracking:     PT Received On: 22  PT Start Time: 0939     PT Stop Time: 1004  PT Total Time (min): 25 min     Billable Minutes: Gait Training 10 min and Therapeutic Activity 15 min        PT/PTA: PT     PTA Visit Number: 0     2022    Co-treatment performed for this visit due to patient need for two skilled  therapists to ensure patient and staff safety and to accommodate for patient activity tolerance/pain management

## 2022-05-02 NOTE — PLAN OF CARE
Hematology Consult     Mr. Gunter was diagnosed with type IIb antiphospholipid antibody syndrome after he had a pulmonary embolism in the setting of COVID-19 infection. He had two labs done 5 months apart which confirmed anticardiolipin antibody presence. DRVVT was only positive once. He is now admitted with cervical epidural hematoma resulting in neurological deficits.    Pending antiphospholipid labs .  Will continue to follow.    Lauryn Gutierres MD  Hematology/Oncology Fellow PGY IV  [facility]

## 2022-05-02 NOTE — PLAN OF CARE
[facility] Care Plan    POC reviewed with Diego Gunter and family at 1400. Pt verbalized understanding. Questions and concerns addressed. No acute events today. Pt progressing toward goals. Will continue to monitor. See below and flowsheets for full assessment and VS info.             Is this a stroke patient? yes- Stroke booklet reviewed with patient and family  , risk factors identified for patient and stroke booklet remains at bedside for ongoing education.     Neuro:  [place] Coma Scale  Best Eye Response: 4-->(E4) spontaneous  Best Motor Response: 6-->(M6) obeys commands  Best Verbal Response: 4-->(V4) confused  [place] Coma Scale Score: 14  Assessment Qualifiers: patient not sedated/intubated  Pupil PERRLA: yes     24 hr Temp:  [97.7 °F (36.5 °C)-98 °F (36.7 °C)]     CV:   Rhythm: normal sinus rhythm  BP goals:   SBP < 160  MAP > 65    Resp:   O2 Device (Oxygen Therapy): room air  Oxygen Concentration (%): 2    Plan: N/A    GI/:     Diet/Nutrition Received: mechanical/dental soft  Last Bowel Movement: 04/27/22  Voiding Characteristics: ureteral catheter    Intake/Output Summary (Last 24 hours) at 5/2/2022 1650  Last data filed at 5/2/2022 1400  Gross per 24 hour   Intake 2545.08 ml   Output 7100 ml   Net -4554.92 ml     Unmeasured Output  Urine Occurrence: 0  Stool Occurrence: 0    Labs/Accuchecks:  Recent Labs   Lab 05/02/22 0053   WBC 13.19*   RBC 3.52*   HGB 9.7*   HCT 29.5*         Recent Labs   Lab 05/02/22  0053   *   K 3.5   CO2 27   *   BUN 14   CREATININE 0.7   ALKPHOS 50*   ALT 27   AST 35   BILITOT 0.4      Recent Labs   Lab 05/02/22  0053   INR 1.1   APTT 33.3*      Recent Labs   Lab 04/29/22  0549 04/30/22  0327 05/01/22  0232   CPK  --    < > 350*   CPKMB  --    < > 6.6*   TROPONINI <0.006  --   --    MB  --    < > 1.9    < > = values in this interval not displayed.       Electrolytes: N/A - electrolytes WDL  Accuchecks: Q6H    Gtts:      LDA/Wounds:  Lines/Drains/Airways        Peripheral Intravenous Line  Duration                  Peripheral IV - Single Lumen 04/28/22 1652 18 G Left Forearm 3 days         Peripheral IV - Single Lumen 04/29/22 0015 20 G Anterior;Right Upper Arm 3 days                  Wounds: No  Wound care consulted: No

## 2022-05-02 NOTE — SUBJECTIVE & OBJECTIVE
Interval History: 5/2: NAEON. AFVSS. Subjective strength improvement. cvEEG negative for seizure.     Objective:     Weight: 74.8 kg (165 lb)  Body mass index is 24.37 kg/m².  Vital Signs (Most Recent):  Temp: 97.8 °F (36.6 °C) (05/02/22 0303)  Pulse: 67 (05/02/22 0813)  Resp: 20 (05/02/22 0813)  BP: (!) 150/71 (05/02/22 0603)  SpO2: 97 % (05/02/22 0813)   Vital Signs (24h Range):  Temp:  [97.8 °F (36.6 °C)-98.6 °F (37 °C)] 97.8 °F (36.6 °C)  Pulse:  [55-83] 67  Resp:  [9-25] 20  SpO2:  [95 %-100 %] 97 %  BP: (109-169)/(55-81) 150/71  Arterial Line BP: (107-160)/(51-88) 153/69     Date 05/02/22 0700 - 05/03/22 0659   Shift 6151-6629 7987-1850 9974-2836 24 Hour Total   INTAKE   P.O. 750   750   Shift Total(mL/kg) 750(10)   750(10)   OUTPUT   Urine(mL/kg/hr) 700   700   Shift Total(mL/kg) 700(9.4)   700(9.4)   Weight (kg) 74.8 74.8 74.8 74.8           Neurosurgery Physical Exam  E3V4M5  Awake, confused  RUE/RLE - 4-4+ throughout  LUE/LLE - 5/5 throughout  SILT        Significant Labs:  Recent Labs   Lab 05/01/22  0232 05/01/22  0538 05/02/22  0053   *  --  186*    143 146*   K 4.4  --  3.5     --  111*   CO2 23  --  27   BUN 9  --  14   CREATININE 0.7  --  0.7   CALCIUM 8.1*  --  8.4*   MG 2.1  --  2.1       Recent Labs   Lab 04/30/22  1246 05/01/22  0232 05/02/22  0053   WBC 22.15* 15.62* 13.19*   HGB 9.9* 9.9* 9.7*   HCT 31.0* 29.9* 29.5*    208 201       Recent Labs   Lab 05/01/22  0232 05/02/22  0053   INR 1.1 1.1   APTT 33.3* 33.3*       Microbiology Results (last 7 days)       Procedure Component Value Units Date/Time    Blood culture [[id number]] Collected: 04/28/22 1201    Order Status: Completed Specimen: Blood from Peripheral, Forearm, Right Updated: 05/01/22 1303     Blood Culture, Routine No Growth to date      No Growth to date      No Growth to date      No Growth to date    Blood culture [[id number]] Collected: 04/28/22 1201    Order Status: Completed Specimen: Blood from  Peripheral, Hand, Left Updated: 05/01/22 1303     Blood Culture, Routine No Growth to date      No Growth to date      No Growth to date      No Growth to date          All pertinent labs from the last 24 hours have been reviewed.    Significant Diagnostics:  No results found in the last 24 hours.

## 2022-05-02 NOTE — PLAN OF CARE
05/02/22 1626   Post-Acute Status   Post-Acute Authorization Placement   Post-Acute Placement Status Referrals Sent  (rehab)     SW met with Pt and Pt wife at bedside. Discussed therapy recs for rehab and provided list. Addressed questions and reported need to send referrals to obtain accepting options. Pt wife agreeable and will review the list for preferences asap. Currently the preference is [facility].     SW sent referrals via [facility] to [facility], [facility], and [facility].    Yanira Evans LCSW  Neurocritical Care   [facility]  [phone]

## 2022-05-02 NOTE — PROCEDURES
Procedures     ICU EEG/VIDEO MONITORING REPORT    DATE OF SERVICE: 5/1/2-5/2/22  EEG NUMBER: FH -1  REQUESTED BY: Ralf  LOCATION OF SERVICE: [facility] [place]     METHODOLOGY   Electroencephalographic (EEG) recording is with electrodes placed according to the International 10-20 placement system.  Thirty two (32) channels of digital signal are simultaneously recorded from the scalp and may include EKG, EMG, and/or eye monitors.   Recording band pass was 0.1 to 512 hz.  Digital video recording of the patient is simultaneously recorded with the EEG.  The nursing staff report clinical symptoms and may press an event button when the patient has symptoms of clinical interest to the treating physicians.  EEG and video recording is stored and archived in digital format.  The entire recording is visually reviewed and the times identified by computer analysis as being spikes or seizures are reviewed again.  Activation procedures which include photic stimulation, hyperventilation and instructing patients to perform simple task are done in selected patients.   Compresses spectral analysis (CSA) is also performed on the activity recorded from each individual channel.  This is displayed as a power display of frequencies from 0 to 30 Hz over time.   The CSA analysis is done and displayed continuously.  This is reviewed for asymmetries in power between homologous areas of the scalp and for presence of changes in power which canbe seen when seizures occur.  Sections of suspected abnormalities on the CSA is then compared with the original EEG recording.     SumAll software was also utilized in the review of this study.  This software suite analyzes the EEG recording in multiple domains.  Coherence and rhythmicity is computed to identify EEG sections which may contain organized seizures.  Each channel undergoes analysis to detect presence of spike and sharp waves which have special and morphological characteristic of epileptic activity.   The routine EEG recording is converted from spacial into frequency domain.  This is then displayed comparing homologous areas to identify areas of significant asymmetry.  Algorithm to identify non-cortically generated artifact is used to separate eye movement, EMG and other artifact from the EEG.      Recording Times  Start on 5/1/22 at 09:39:34  Stop on 5/2/22 at 07:00:02  Start on 5/2/22 at 07:01:46  Stop on 5/2/22 at 14:12:29    A total of 28  hours of EEG was recorded.    EEG FINDINGS  The record shows a poor organization at rest, with no discernible posterior dominant rhythm with fair  reactivity. There is mild bilateral beta activity. There is continuous diffuse delta and theta range background slowing.    Drowsiness is characterized by attenuation of the background, vertex waves, and bilateral theta slowing. Stage II sleep is characterized by slowing, vertex waves, and symmetric sleep spindles.     Provocative maneuvers including hyperventilation and photic stimulation were performed.     EKG recording shows a regular rhythm.    There is no push button or clinical event    IMPRESSION:  Abnormal study due to moderate  diffuse background slowing consistent with diffuse cerebral dysfunction and encephalopathy which may be on the basis of toxic, metabolic, or primary neuronal disorder.     Jonnie Schmid MD  Department of Neurology  [facility]

## 2022-05-02 NOTE — PLAN OF CARE
[facility] Care Plan    POC reviewed with Diego Gunter and  at 0300. Pt verbalized understanding. Questions and concerns addressed. No acute events overnight. Pt progressing toward goals. Will continue to monitor. See below and flowsheets for full assessment and VS info.     -1 liter bolus of LR given to maintain MAPs above 80  -Pt baseline more confused at night        Is this a stroke patient? no    Neuro:  [place] Coma Scale  Best Eye Response: 4-->(E4) spontaneous  Best Motor Response: 6-->(M6) obeys commands  Best Verbal Response: 4-->(V4) confused  [place] Coma Scale Score: 14  Assessment Qualifiers: patient not sedated/intubated  Pupil PERRLA: yes     24hr Temp:  [97.8 °F (36.6 °C)-98.6 °F (37 °C)]     CV:   Rhythm: normal sinus rhythm  BP goals:   SBP < 160  MAP >  80    Resp:   O2 Device (Oxygen Therapy): nasal cannula  Oxygen Concentration (%): 2    Plan: N/A    GI/:     Diet/Nutrition Received: mechanical/dental soft  Last Bowel Movement: 04/27/22  Voiding Characteristics: urethral catheter (bladder)    Intake/Output Summary (Last 24 hours) at 5/2/2022 0310  Last data filed at 5/2/2022 0303  Gross per 24 hour   Intake 3261.69 ml   Output 7585 ml   Net -4323.31 ml     Unmeasured Output  Urine Occurrence: 0  Stool Occurrence: 0    Labs/Accuchecks:  Recent Labs   Lab 05/02/22  0053   WBC 13.19*   RBC 3.52*   HGB 9.7*   HCT 29.5*         Recent Labs   Lab 05/02/22  0053   *   K 3.5   CO2 27   *   BUN 14   CREATININE 0.7   ALKPHOS 50*   ALT 27   AST 35   BILITOT 0.4      Recent Labs   Lab 05/02/22  0053   INR 1.1   APTT 33.3*      Recent Labs   Lab 04/29/22  0549 04/30/22  0327 05/01/22  0232   CPK  --    < > 350*   CPKMB  --    < > 6.6*   TROPONINI <0.006  --   --    MB  --    < > 1.9    < > = values in this interval not displayed.       Electrolytes: Electrolytes replaced  Accuchecks: Q6H    Gtts:      LDA/Wounds:  Lines/Drains/Airways       Drain  Duration                  Urethral  Catheter 04/30/22 1000 Double-lumen 20 Fr. 1 day              Arterial Line  Duration             Arterial Line 04/28/22 1600 Left 3 days              Peripheral Intravenous Line  Duration                  Peripheral IV - Single Lumen 04/28/22 1652 18 G Left Forearm 3 days         Peripheral IV - Single Lumen 04/29/22 0015 20 G Anterior;Right Upper Arm 3 days                  Wounds: No  Wound care consulted: No

## 2022-05-02 NOTE — PLAN OF CARE
Goals reviewed and revised as appropriate. Continue POC.    Problem: Physical Therapy  Goal: Physical Therapy Goal  Description: Goals to be met by: 2022     Patient will increase functional independence with mobility by performin. Supine to sit with minimum assistance  2. Sit to supine with minimum assistance - met 2022  Updated: supervision while maintaining spinal precautions   3. Sit to stand transfer with minimum assistance  4. Bed to chair transfer with minimum assistance using LRAD as needed  5. Gait  x 25 feet with minimum assistance using LRAD as needed  6. Lower extremity exercise program x10 reps per handout, with independence  7. Recall 3/3 spinal precautions and how to don/doff c-collar with supervision   Outcome: Ongoing, Progressing

## 2022-05-02 NOTE — PT/OT/SLP PROGRESS
Speech Language Pathology Treatment    Patient Name:  Diego Gunter   MRN:  [id number]  Admitting Diagnosis: Epidural hematoma    Recommendations:                 General Recommendations:  Dysphagia therapy and Speech/language therapy  Diet recommendations:  Regular, Liquid Diet Level: Nectar    Aspiration Precautions: Assistance with meals and Assistance with thickening liquids, Meds whole 1 at a time, Small bites/sips and Standard aspiration precautions   General Precautions: Standard, fall, aspiration  Communication strategies:  go to room if call light pushed    Subjective     Patient awake and alert. Spouse present. Cleared with RN prior to session.     Pain/Comfort:  ·  No c/o pain.     Respiratory Status: Nasal cannula, flow . L/min    Objective:     Has the patient been evaluated by SLP for swallowing?   Yes  Keep patient NPO? No   Current Respiratory Status:        Patient awake and alert, improved verbal responses this session.  Patient tolerated thin liquids via tsp and straw sips x4 with throat clears and audible swallows. No overt signs of airway compromise with nectar thickened liquids,  Regular solids x4 tolerated well with adequate mastication and bolus formation.  Patient completed confrontation naming with 90% acc, though paraphasia x1 appreciated.  Paraphasias increased during spontaneous utterances. Simple compare and contrast activities completed with 100%. Skilled education was provided to patient and family members re: diet recs, standard aspiration precautions of which to follow, and ongoing ST plan of care.      Assessment:     Diego Gunter is a 81 y.o. male with an SLP diagnosis of Aphasia and Dysphagia.      Goals:   Multidisciplinary Problems     SLP Goals        Problem: SLP    Goal Priority Disciplines Outcome   SLP Goal    Low SLP Ongoing, Progressing   Description: Speech Language Pathology Goals  Goals expected to be met by 5/6:  1. Patient will tolerate a puree diet and nectar  thickened liquids with no overt signs of airway compromise.   2. Patient will answer simple yes/no questions with 50% acc indep.   3. Patient will complete auto speech tasks with 60% acc given min A.   4. Patient will follow simple commands with 60% acc given min A.   5. Patient will participate in further assessments as appropriate.                              Plan:     · Patient to be seen:  3 x/week   · Plan of Care expires:     · Plan of Care reviewed with:  patient   · SLP Follow-Up:  Yes       Discharge recommendations:  rehabilitation facility   Barriers to Discharge:  None    Time Tracking:     SLP Treatment Date:   05/02/22  Speech Start Time:  0849  Speech Stop Time:  0903     Speech Total Time (min):  14 min    Billable Minutes: Speech Therapy Individual 7 and Treatment Swallowing Dysfunction 7    05/02/2022

## 2022-05-02 NOTE — NURSING TRANSFER
Nursing Transfer Note      5/2/2022     Reason patient is being transferred: stepdown    Transfer To: 921    Transfer via bed    Transfer with cardiac monitoring    Transported by SHARON Maxwell and PCT    Medicines sent: PRN meds in bag with pt's label    Any special needs or follow-up needed: N/A    Chart send with patient: Yes    Notified: spouse at bedside    Patient reassessed at: 5/2 @ 1620    Upon arrival to floor: cardiac monitor applied, patient oriented to room, call bell in reach and bed in lowest position. Neuro exam unchanged. VSS.

## 2022-05-02 NOTE — ASSESSMENT & PLAN NOTE
80 y/o male with APS on coumadin, hx PE, MAC pneumonia, HLD with spontaneous C3-C6 epidural hematoma compressing the spinal cord    S/p  C3-C6 laminectomy and fusion for evacuation of epidural hematoma 4/28    Plan:  Stepdown to NSY, floor status  All imaing personally reviewed   -- CTH 4/29 no acute intracranial process   -- Post op MRI C spine satisfactory decompression with continued cord signal edema   --postop XR C spine hardware in good position  -- c collar on at all times  -- HV drains removed 4/30  -- d/c MAP goals  -- Dex to 4q8  -- d/c amaya; f/u PVR  -- PT/OT  -- SW for IPR placement    Dispo: likely IPR

## 2022-05-02 NOTE — PROGRESS NOTES
[facility]  Neurocritical Care  Progress Note    Admit Date: 4/28/2022  Service Date: 05/02/2022  Length of Stay: 4    Subjective:     Chief Complaint: Epidural hematoma    History of Present Illness: 80 yo M with PMH antiphospholipid syndrome on chronic coumadin for left PE diagnosed Jan 2021, DM2, prostate ca s/p prostatectomy, MAC pneumonia dx Fall 2018, mild cognitive impairment, and HLD who was transferred from OSH for compressive epidural hematoma in the cervical spine. Patient reports that he woke up in the middle of the night and could not move the right side of his body. He went to  ED and stroke code was initiated. Patient was not given tPA but given ASA. CTA was negative for acute CVA but did show compressive fluid collective from C3-C6. MRI cervical spine confirmed. [place] [facility] [place]. He reported to NSGY he could not move his right arm out of the contracted position, and he also could not lift his right leg off the bed. He reported his right arm and leg were in pain. No complaints on left side of body. Denied b/b dysfunction. However, when he was taken to the OR and amaya was placed, he put out >1L urine. Pt admitted to [facility] for monitoring and management s/p C3-C6 decompression of epidural hematoma and posterior fusion.      Hospital Course: 04/29/2022C: CT head today per NSGY team, DDAVP once, ID consult  04/30/2022 levophed for map goals ; continue ID abx f/u cx; DDAVP 0600 for high uop; 0.45NS; traumatic amaya removal by pt w/ bleeding from urethra: new amaya inserted without clots in bladder; MRI cspine today   05/01/2022 maps goal ; cont abx per ID; monitor uop; no new bleeding noted from urethral meatus cont aamya until 5/3 and reassess;       05/2/2022 NAEON; D/C EEG; KUB for constipation escalate bowel reg as needed; stable for floor, transfer     Review of Systems  Constitutional: Denies fevers, weight loss, chills, or  weakness.  Eyes: Denies changes in vision.  ENT: Denies dysphagia, nasal discharge, ear pain or discharge.  Cardiovascular: Denies chest pain, palpitations, orthopnea, or claudication.  Respiratory: Denies shortness of breath, cough, hemoptysis, or wheezing.  GI: Denies nausea/vomitting, hematochezia, melena, abd pain, or changes in appetite.  : Denies dysuria, incontinence, or hematuria.  Musculoskeletal: Denies joint pain or myalgias.  Skin/breast: Denies rashes, lumps, lesions, or discharge.  Neurologic: Denies headache, dizziness, vertigo, or paresthesias.  Psychiatric: Denies changes in mood or hallucinations.  Endocrine: Denies polyuria, polydipsia, heat/cold intolerance.  Hematologic/Lymph: Denies lymphadenopathy, easy bruising or easy bleeding.  Allergic/Immunologic: Denies rash, rhinitis.    Objective:      Vitals:    05/02/22 0800 05/02/22 0813 05/02/22 0900 05/02/22 1000   BP: (!) 162/79  (!) 152/79    BP Location:       Patient Position:       Pulse: 64 67 81 (!) 123   Resp: 20 20 (!) 27 20   Temp:       TempSrc:       SpO2: 97% 97% 97% 100%   Weight:       Height:               Physical Exam  GA: Alert, comfortable, no acute distress.   HEENT: No scleral icterus or JVD.   Pulmonary: Clear to auscultation A/L.   Cardiac: RRR S1 & S2 w/o rubs/murmurs/gallops.   Abdominal: Bowel sounds present x 4. No appreciable hepatosplenomegaly.  Skin: No jaundice, rashes, or visible lesions.  Blood clots at urethral meatus   Neuro:  --GCS: E4 V4 M6  --Mental Status:  opens eyes, delayed responses, tracks, oriented to self only  --CN II-XII grossly intact.   --Pupils 3mm, PERRL.   --Corneal reflex, gag, cough intact.  --LIU spont, BLE unable hold against gravity, RUE weak     Medications:  Scheduled Meds:   albuterol-ipratropium  3 mL Nebulization Q6H    azithromycin  500 mg Oral Daily    DAPTOmycin (CUBICIN)  IV  8 mg/kg Intravenous Q24H    dexamethasone  4 mg Intravenous Q8H    ethambutoL  800 mg Oral Daily     famotidine  20 mg Oral BID    heparin (porcine)  5,000 Units Subcutaneous Q8H    methocarbamoL  500 mg Oral Q6H    mupirocin   Nasal BID    polyethylene glycol  17 g Oral Daily    pravastatin  10 mg Oral Daily    pregabalin  50 mg Oral TID    QUEtiapine  25 mg Oral QHS    senna-docusate 8.6-50 mg  1 tablet Oral BID    silodosin  4 mg Oral Daily    sodium chloride 3%  4 mL Nebulization BID     Continuous Infusions:  PRN Meds:.acetaminophen, calcium gluconate IVPB, calcium gluconate IVPB, calcium gluconate IVPB, dextrose 10%, dextrose 10%, glucagon (human recombinant), insulin aspart U-100, magnesium sulfate IVPB, magnesium sulfate IVPB, melatonin, metoclopramide HCl, ondansetron, oxyCODONE, potassium chloride **AND** potassium chloride **AND** potassium chloride, potassium, sodium phosphates, potassium, sodium phosphates, potassium, sodium phosphates, sodium chloride 0.9%, sodium chloride 0.9%     Today I personally reviewed pertinent medications, lines/drains/airways, imaging, cardiology results, laboratory results, microbiology results, MRI pending     Diet  Diet Dysphagia Pureed (IDDSI Level 4) Nectar Thick     Assessment/Plan:      Neuro  * Epidural hematoma  s/p C3-C6 decompression of epidural hematoma and posterior fusion     -NSGY following  -dex 4 q6h + H2B  -MAP goal >85, maintaining on levophed  - q1h neuro and VS checks  -PT/OT/SLP as appropriate  -c collar in place  -drains per NSGY  -post op XR pending  -hold AC/AP in acute post op setting     4/30 MRI cspine w/ good surgical evacuation and cord edema         Acute encephalopathy  Improved  Delirium precautions      Acute ischemic left MCA stroke  Neg on CTA  Given ASA at OSH before epidural hematoma found as cause of acute hemiplegia     Cognitive communication deficit  Noted in hx     Pulmonary  Cavitary lung disease  See MAC, cont ABx     Cardiac/Vascular  Hyperlipidemia associated with type 2 diabetes mellitus  Cont  statin     Renal/  Urinary retention  Kept amaya in post op d/t IVF running for MAP goals and urinary retention noted in OR  -silodosin      4/30 traumatic amaya removal by confused pt; new amaya inserted; no clots during amaya flushes; blood at urethral meatus      ID  HILLARY (mycobacterium avium-intracellulare)  Dx Fall 2018\  Cont azithromycin and ethambutol  ID consulted     Hematology  Antiphospholipid syndrome  Hold AC/AP for now  Follow CBC and coags closely  Hematology consult, appreciate reccs     History of pulmonary embolism  Dx Jan 2021  Hold warfarin     Oncology  History of prostate cancer  S/p prostatectomy 2011       The patient is being Prophylaxed for:  Venous Thromboembolism with: Mechanical  Stress Ulcer with: None  Ventilator Pneumonia with: not applicable    Activity Orders          Diet Dysphagia Pureed (IDDSI Level 4) Nectar Thick: Dysphagia 1 (Pureed) starting at 04/29 1027    Turn patient starting at 04/28 2200    Elevate HOB Elevate (< 30 degrees) starting at 04/28 0630    Straight Cath starting at 04/28 0629        Full Code    Lucian Edwards MD  Neurocritical Care  [facility]

## 2022-05-02 NOTE — PT/OT/SLP PROGRESS
Occupational Therapy   Co-Treatment w/ PT    Additional staff present: PT for co-tx due to patient's medical complexities requiring two skilled therapists in order to appropriately assess patient's functional deficits as well as ensure patient safety, accommodate for limited activity tolerance, and provide appropriate, skilled assistance to maximize functional potential during evaluation.    Patient Name:  Diego Gunter   MRN:  [id number]  Admit Date: 4/28/2022  Admitting Diagnosis:  Epidural hematoma   Length of Stay: 4 days  Recent Surgery: Procedure(s) (LRB):  LAMINECTOMY, SPINE, CERVICAL, POSTERIOR APPROACH C3-6 (Bilateral) 4 Days Post-Op    Procedure(s):  LAMINECTOMY, SPINE, CERVICAL, POSTERIOR APPROACH C3-6     Recommendations:     Discharge Recommendations: rehabilitation facility  Discharge Equipment Recommendations:   (TBD)  Barriers to discharge:   (increased assistance needed)    Plan:     Patient to be seen 4 x/week to address the above listed problems via self-care/home management, therapeutic activities, neuromuscular re-education, therapeutic exercises  · Plan of Care Expires: 05/29/22  · Plan of Care Reviewed with: patient, family    Assessment:   Diego Gunter is a 81 y.o. male with a medical diagnosis of Epidural hematoma.  He presents with the following performance deficits affecting function: weakness, impaired endurance, impaired self care skills, impaired functional mobilty, gait instability, impaired balance, decreased safety awareness, decreased lower extremity function, decreased coordination, impaired coordination, decreased ROM, impaired fine motor, orthopedic precautions, impaired joint extensibility.  Pt would benefit from skilled OT services in order to maximize independence with ADLs and facilitate safe discharge. Pt's clinical condition meets full inpatient rehab criteria. Inpatient rehab will provide to total interdisciplinary treatment approach needed. Pt is at high risk of  "unplanned readmission due to fall risk. During standing trial, pt noted to have slight bleeding of perineal area from amaya site - RN informed    Time spent performing bed mobility, EOB activity w/ focus on ROM and sitting balance and performing STS and short ambulation     Relevant hx and current limitations:  · Affected side: right  · Speech: dysarthric  · Neglect/Inattention: R inattention -improved  · Command Following: ~100% simple command following ([place])  · EOB sitting: Mod-CGA A (impaired midline)  ? Left posterolateral lean  · Diet: nectar liquids and regular diet  · Global ataxia -improved    Pt continues to require significant assist with functional mobility and safe completion of ADLs requiring Max-Min A. Pt with noted improvement with bed mobility, EOB sitting balance and STS as compared to last session. Patient is making progress towards goals.    Rehab Prognosis: Good; patient would benefit from acute skilled OT services to address these deficits and reach maximum level of function.        Subjective   Communicated with: SHARON Maxwell prior to session.  Patient found HOB elevated with bed alarm, blood pressure cuff, pulse ox (continuous), telemetry, SCD, cervical collar, amaya catheter, peripheral IV, EEG, oxygen upon OT entry to room.    Patient: family -"he is a lot better today than when you first saw him"    Pain/Comfort:  · Pain Rating 1: 0/10    Objective:   Patient found with: bed alarm, blood pressure cuff, pulse ox (continuous), telemetry, SCD, cervical collar, amaya catheter, peripheral IV, EEG, oxygen   General Precautions: Standard, aspiration, fall   Orthopedic Precautions:spinal precautions   Braces: Cervical collar   Oxygen Device: Room Air  Vitals: /60   Pulse 74   Temp 98 °F (36.7 °C) (Oral)   Resp 16   Ht 5' 9" (1.753 m)   Wt 74.8 kg (165 lb)   SpO2 95%   BMI 24.37 kg/m²     Outcome Measures:  AMPAC 6 Click ADL: 9    Occupational Performance:  Bed Mobility:       Rolling " left: minimum assistance   Scooting: towards EOB with moderate assistance   Supine to Sit: minimum assistance   Sit to Supine: moderate assistance    Functional Mobility/Transfers:     Sit to Stand: minimum assistance and of 2 persons using hand-held assist; x3 trials   Pt ambulated ~6 lateral steps Mod A x2 persons with B HHA to simulate household and/or community distances in order to maximize functional activity tolerance and dynamic standing balance required for engagement in occupations of choice.   o LOB: No, B knee instability that req'd close guarding, VC for upright posture and L lateral lean in standing  o SOB: No  o Dizziness: No    Activities of Daily Living:     Not observed this date, however pt assisted in rolling onto bed pan once he was returned to supine      AMPA 6 Click ADL:  [facility] Total Score: 9    Therapeutic Exercise and/or Activity:  -AAROM performed RUE in all joints/planes of motion with stretches provided at end range, as indicated for recovery of effective participation in functional tasks, and to bolster proper circulation.  Sustained stretch provided for shoulder flexion and elbow extension at end range.   Assistance and facilitation provided for inferior angle of the scapula during shoulder flexion and abduction.     -RUE joint approximation provided to normalize tone.   o Pt w/ decreased R shoulder flexion; x10 trials of assisted ROM    Neuromuscular Re-Education:  Pt w/ poor sitting balance sitting EOB that steadily improved from Mod-CGA d/t positioning and engaging pt in core strengthening exercises  Pt participated in exercises that work on dynamic sitting balance: crossing midline w/ BUE and recovering from leaning to far to one side or forward  Improved this date, able to correct to self to midline w/ VC 75% of the time    Treatment & Education:   Therapist provided facilitation and instruction of proper body mechanics, energy conservation, and fall prevention strategies  during tasks listed above.   Pt re-educated on importance of OOB activities with staff member assistance and sitting OOB majority of the day.    Updated communication board with level of assist required (Mod A stand pivot) & educated RN/patient that pt is appropriate for transfers and mobility with RN/PCT.       Patient left HOB elevated with all lines intact, call button in reach, bed alarm on, RN notified and RN and family present    GOALS:   Multidisciplinary Problems     Occupational Therapy Goals        Problem: Occupational Therapy    Goal Priority Disciplines Outcome Interventions   Occupational Therapy Goal     OT, PT/OT Ongoing, Progressing    Description: Goals to be met by: 5/28/2022     Patient will increase functional independence with ADLs by performing:    Feeding with Set-up Assistance.  UE Dressing with Minimal Assistance.  LE Dressing with Moderate Assistance.  Grooming while seated with Set-up Assistance.  Toileting from bedside commode with Minimal Assistance for hygiene and clothing management.   Sitting at edge of bed x15 minutes with Supervision.  Toilet transfer to bedside commode with Contact Guard Assistance.                     Time Tracking:     OT Date of Treatment: 05/02/22  OT Start Time: 0939  OT Stop Time: 1004  OT Total Time (min): 25 min    Additional staff present: PT    Billable Minutes:  Therapeutic Exercise 15  Neuromuscular Re-education 10      Verna (Jose Eduardo), OTR/L  [phone] (Pager #)  5/2/2022

## 2022-05-03 PROBLEM — Z74.09 IMPAIRED MOBILITY: Status: ACTIVE | Noted: 2022-05-03

## 2022-05-03 LAB
ALBUMIN SERPL BCP-MCNC: 3.5 G/DL (ref 3.5–5.2)
ALP SERPL-CCNC: 58 U/L (ref 55–135)
ALT SERPL W/O P-5'-P-CCNC: 49 U/L (ref 10–44)
ANION GAP SERPL CALC-SCNC: 10 MMOL/L (ref 8–16)
ANION GAP SERPL CALC-SCNC: 9 MMOL/L (ref 8–16)
ANISOCYTOSIS BLD QL SMEAR: SLIGHT
APTT BLDCRRT: 27.3 SEC (ref 21–32)
AST SERPL-CCNC: 44 U/L (ref 10–40)
B2 GLYCOPROT1 IGA SER QL: <9 SAU
B2 GLYCOPROT1 IGG SER QL: <9 SGU
B2 GLYCOPROT1 IGM SER QL: <9 SMU
BASOPHILS # BLD AUTO: 0.05 K/UL (ref 0–0.2)
BASOPHILS NFR BLD: 0.3 % (ref 0–1.9)
BILIRUB SERPL-MCNC: 0.7 MG/DL (ref 0.1–1)
BILIRUB UR QL STRIP: NEGATIVE
BUN SERPL-MCNC: 15 MG/DL (ref 8–23)
BUN SERPL-MCNC: 17 MG/DL (ref 8–23)
CALCIUM SERPL-MCNC: 8.9 MG/DL (ref 8.7–10.5)
CALCIUM SERPL-MCNC: 9.1 MG/DL (ref 8.7–10.5)
CARDIOLIPIN IGG SER IA-ACNC: <9.4 GPL (ref 0–14.99)
CARDIOLIPIN IGM SER IA-ACNC: 36.4 MPL (ref 0–12.49)
CHLORIDE SERPL-SCNC: 107 MMOL/L (ref 95–110)
CHLORIDE SERPL-SCNC: 110 MMOL/L (ref 95–110)
CK MB SERPL-MCNC: 3.8 NG/ML (ref 0.1–6.5)
CK MB SERPL-RTO: 2.6 % (ref 0–5)
CK SERPL-CCNC: 147 U/L (ref 20–200)
CLARITY UR REFRACT.AUTO: CLEAR
CO2 SERPL-SCNC: 26 MMOL/L (ref 23–29)
CO2 SERPL-SCNC: 27 MMOL/L (ref 23–29)
COLOR UR AUTO: NORMAL
CREAT SERPL-MCNC: 0.8 MG/DL (ref 0.5–1.4)
CREAT SERPL-MCNC: 0.8 MG/DL (ref 0.5–1.4)
DIFFERENTIAL METHOD: ABNORMAL
EOSINOPHIL # BLD AUTO: 0 K/UL (ref 0–0.5)
EOSINOPHIL NFR BLD: 0 % (ref 0–8)
ERYTHROCYTE [DISTWIDTH] IN BLOOD BY AUTOMATED COUNT: 15.9 % (ref 11.5–14.5)
EST. GFR  (AFRICAN AMERICAN): >60 ML/MIN/1.73 M^2
EST. GFR  (AFRICAN AMERICAN): >60 ML/MIN/1.73 M^2
EST. GFR  (NON AFRICAN AMERICAN): >60 ML/MIN/1.73 M^2
EST. GFR  (NON AFRICAN AMERICAN): >60 ML/MIN/1.73 M^2
GLUCOSE SERPL-MCNC: 112 MG/DL (ref 70–110)
GLUCOSE SERPL-MCNC: 128 MG/DL (ref 70–110)
GLUCOSE UR QL STRIP: NEGATIVE
HCT VFR BLD AUTO: 32.8 % (ref 40–54)
HGB BLD-MCNC: 10.9 G/DL (ref 14–18)
HGB UR QL STRIP: NEGATIVE
HYPOCHROMIA BLD QL SMEAR: ABNORMAL
IMM GRANULOCYTES # BLD AUTO: 0.38 K/UL (ref 0–0.04)
IMM GRANULOCYTES NFR BLD AUTO: 1.9 % (ref 0–0.5)
INR PPP: 1.1 (ref 0.8–1.2)
KETONES UR QL STRIP: NEGATIVE
LEUKOCYTE ESTERASE UR QL STRIP: NEGATIVE
LYMPHOCYTES # BLD AUTO: 1.8 K/UL (ref 1–4.8)
LYMPHOCYTES NFR BLD: 8.9 % (ref 18–48)
MAGNESIUM SERPL-MCNC: 2 MG/DL (ref 1.6–2.6)
MCH RBC QN AUTO: 27.4 PG (ref 27–31)
MCHC RBC AUTO-ENTMCNC: 33.2 G/DL (ref 32–36)
MCV RBC AUTO: 82 FL (ref 82–98)
MONOCYTES # BLD AUTO: 2.2 K/UL (ref 0.3–1)
MONOCYTES NFR BLD: 11.4 % (ref 4–15)
NEUTROPHILS # BLD AUTO: 15.2 K/UL (ref 1.8–7.7)
NEUTROPHILS NFR BLD: 77.5 % (ref 38–73)
NITRITE UR QL STRIP: NEGATIVE
NRBC BLD-RTO: 0 /100 WBC
PH UR STRIP: 7 [PH] (ref 5–8)
PHOSPHATE SERPL-MCNC: 3.5 MG/DL (ref 2.7–4.5)
PLATELET # BLD AUTO: 236 K/UL (ref 150–450)
PLATELET BLD QL SMEAR: ABNORMAL
PMV BLD AUTO: 10.2 FL (ref 9.2–12.9)
POCT GLUCOSE: 143 MG/DL (ref 70–110)
POCT GLUCOSE: 155 MG/DL (ref 70–110)
POIKILOCYTOSIS BLD QL SMEAR: SLIGHT
POTASSIUM SERPL-SCNC: 3.6 MMOL/L (ref 3.5–5.1)
POTASSIUM SERPL-SCNC: 3.6 MMOL/L (ref 3.5–5.1)
PROT SERPL-MCNC: 6.5 G/DL (ref 6–8.4)
PROT UR QL STRIP: NEGATIVE
PROTHROMBIN TIME: 11.4 SEC (ref 9–12.5)
RBC # BLD AUTO: 3.98 M/UL (ref 4.6–6.2)
SODIUM SERPL-SCNC: 143 MMOL/L (ref 136–145)
SODIUM SERPL-SCNC: 146 MMOL/L (ref 136–145)
SP GR UR STRIP: 1 (ref 1–1.03)
TROPONIN I SERPL DL<=0.01 NG/ML-MCNC: 0.01 NG/ML (ref 0–0.03)
URN SPEC COLLECT METH UR: NORMAL
WBC # BLD AUTO: 19.62 K/UL (ref 3.9–12.7)

## 2022-05-03 PROCEDURE — 25000242 PHARM REV CODE 250 ALT 637 W/ HCPCS: Performed by: STUDENT IN AN ORGANIZED HEALTH CARE EDUCATION/TRAINING PROGRAM

## 2022-05-03 PROCEDURE — 85610 PROTHROMBIN TIME: CPT | Performed by: STUDENT IN AN ORGANIZED HEALTH CARE EDUCATION/TRAINING PROGRAM

## 2022-05-03 PROCEDURE — 81003 URINALYSIS AUTO W/O SCOPE: CPT

## 2022-05-03 PROCEDURE — 92526 ORAL FUNCTION THERAPY: CPT

## 2022-05-03 PROCEDURE — 85025 COMPLETE CBC W/AUTO DIFF WBC: CPT | Performed by: STUDENT IN AN ORGANIZED HEALTH CARE EDUCATION/TRAINING PROGRAM

## 2022-05-03 PROCEDURE — 63700000 PHARM REV CODE 250 ALT 637 W/O HCPCS: Performed by: STUDENT IN AN ORGANIZED HEALTH CARE EDUCATION/TRAINING PROGRAM

## 2022-05-03 PROCEDURE — 93005 ELECTROCARDIOGRAM TRACING: CPT

## 2022-05-03 PROCEDURE — 11000001 HC ACUTE MED/SURG PRIVATE ROOM

## 2022-05-03 PROCEDURE — 25000003 PHARM REV CODE 250: Performed by: STUDENT IN AN ORGANIZED HEALTH CARE EDUCATION/TRAINING PROGRAM

## 2022-05-03 PROCEDURE — 99223 PR INITIAL HOSPITAL CARE,LEVL III: ICD-10-PCS | Mod: GC,,, | Performed by: HOSPITALIST

## 2022-05-03 PROCEDURE — 99222 1ST HOSP IP/OBS MODERATE 55: CPT | Mod: ,,, | Performed by: NURSE PRACTITIONER

## 2022-05-03 PROCEDURE — 92507 TX SP LANG VOICE COMM INDIV: CPT

## 2022-05-03 PROCEDURE — 99024 POSTOP FOLLOW-UP VISIT: CPT | Mod: ,,, | Performed by: PHYSICIAN ASSISTANT

## 2022-05-03 PROCEDURE — 99024 PR POST-OP FOLLOW-UP VISIT: ICD-10-PCS | Mod: ,,, | Performed by: PHYSICIAN ASSISTANT

## 2022-05-03 PROCEDURE — 83735 ASSAY OF MAGNESIUM: CPT | Performed by: STUDENT IN AN ORGANIZED HEALTH CARE EDUCATION/TRAINING PROGRAM

## 2022-05-03 PROCEDURE — 99222 PR INITIAL HOSPITAL CARE,LEVL II: ICD-10-PCS | Mod: ,,, | Performed by: NURSE PRACTITIONER

## 2022-05-03 PROCEDURE — 63600175 PHARM REV CODE 636 W HCPCS: Performed by: STUDENT IN AN ORGANIZED HEALTH CARE EDUCATION/TRAINING PROGRAM

## 2022-05-03 PROCEDURE — 99900035 HC TECH TIME PER 15 MIN (STAT)

## 2022-05-03 PROCEDURE — 36415 COLL VENOUS BLD VENIPUNCTURE: CPT | Performed by: INTERNAL MEDICINE

## 2022-05-03 PROCEDURE — 93010 EKG 12-LEAD: ICD-10-PCS | Mod: ,,, | Performed by: INTERNAL MEDICINE

## 2022-05-03 PROCEDURE — 80048 BASIC METABOLIC PNL TOTAL CA: CPT | Mod: XB

## 2022-05-03 PROCEDURE — 94761 N-INVAS EAR/PLS OXIMETRY MLT: CPT

## 2022-05-03 PROCEDURE — 80053 COMPREHEN METABOLIC PANEL: CPT | Performed by: STUDENT IN AN ORGANIZED HEALTH CARE EDUCATION/TRAINING PROGRAM

## 2022-05-03 PROCEDURE — 99223 1ST HOSP IP/OBS HIGH 75: CPT | Mod: GC,,, | Performed by: HOSPITALIST

## 2022-05-03 PROCEDURE — 82553 CREATINE MB FRACTION: CPT | Performed by: INTERNAL MEDICINE

## 2022-05-03 PROCEDURE — 85730 THROMBOPLASTIN TIME PARTIAL: CPT | Performed by: STUDENT IN AN ORGANIZED HEALTH CARE EDUCATION/TRAINING PROGRAM

## 2022-05-03 PROCEDURE — 25000003 PHARM REV CODE 250: Performed by: PHYSICIAN ASSISTANT

## 2022-05-03 PROCEDURE — 93010 ELECTROCARDIOGRAM REPORT: CPT | Mod: ,,, | Performed by: INTERNAL MEDICINE

## 2022-05-03 PROCEDURE — 87040 BLOOD CULTURE FOR BACTERIA: CPT | Mod: 59

## 2022-05-03 PROCEDURE — 25000003 PHARM REV CODE 250

## 2022-05-03 PROCEDURE — 94640 AIRWAY INHALATION TREATMENT: CPT

## 2022-05-03 PROCEDURE — 84484 ASSAY OF TROPONIN QUANT: CPT | Performed by: INTERNAL MEDICINE

## 2022-05-03 PROCEDURE — 84100 ASSAY OF PHOSPHORUS: CPT | Performed by: STUDENT IN AN ORGANIZED HEALTH CARE EDUCATION/TRAINING PROGRAM

## 2022-05-03 RX ORDER — ACETAMINOPHEN 500 MG
1000 TABLET ORAL 3 TIMES DAILY
Status: DISCONTINUED | OUTPATIENT
Start: 2022-05-03 | End: 2022-05-13 | Stop reason: HOSPADM

## 2022-05-03 RX ORDER — DEXTROSE MONOHYDRATE 50 MG/ML
INJECTION, SOLUTION INTRAVENOUS CONTINUOUS
Status: ACTIVE | OUTPATIENT
Start: 2022-05-03 | End: 2022-05-04

## 2022-05-03 RX ORDER — LIDOCAINE 50 MG/G
1 PATCH TOPICAL
Status: DISCONTINUED | OUTPATIENT
Start: 2022-05-03 | End: 2022-05-13 | Stop reason: HOSPADM

## 2022-05-03 RX ADMIN — IPRATROPIUM BROMIDE AND ALBUTEROL SULFATE 3 ML: 2.5; .5 SOLUTION RESPIRATORY (INHALATION) at 07:05

## 2022-05-03 RX ADMIN — DAPTOMYCIN 600 MG: 500 INJECTION, POWDER, LYOPHILIZED, FOR SOLUTION INTRAVENOUS at 05:05

## 2022-05-03 RX ADMIN — IPRATROPIUM BROMIDE AND ALBUTEROL SULFATE 3 ML: 2.5; .5 SOLUTION RESPIRATORY (INHALATION) at 08:05

## 2022-05-03 RX ADMIN — AZITHROMYCIN MONOHYDRATE 500 MG: 250 TABLET ORAL at 09:05

## 2022-05-03 RX ADMIN — PREGABALIN 50 MG: 50 CAPSULE ORAL at 09:05

## 2022-05-03 RX ADMIN — ETHAMBUTOL HYDROCHLORIDE 800 MG: 400 TABLET, FILM COATED ORAL at 10:05

## 2022-05-03 RX ADMIN — PRAVASTATIN SODIUM 10 MG: 10 TABLET ORAL at 09:05

## 2022-05-03 RX ADMIN — IPRATROPIUM BROMIDE AND ALBUTEROL SULFATE 3 ML: 2.5; .5 SOLUTION RESPIRATORY (INHALATION) at 01:05

## 2022-05-03 RX ADMIN — POLYETHYLENE GLYCOL 3350 17 G: 17 POWDER, FOR SOLUTION ORAL at 09:05

## 2022-05-03 RX ADMIN — SODIUM CHLORIDE SOLN NEBU 3% 4 ML: 3 NEBU SOLN at 07:05

## 2022-05-03 RX ADMIN — FAMOTIDINE 20 MG: 20 TABLET ORAL at 09:05

## 2022-05-03 RX ADMIN — SODIUM CHLORIDE SOLN NEBU 3% 4 ML: 3 NEBU SOLN at 08:05

## 2022-05-03 RX ADMIN — SILODOSIN 4 MG: 4 CAPSULE ORAL at 09:05

## 2022-05-03 RX ADMIN — Medication 1 ENEMA: at 11:05

## 2022-05-03 RX ADMIN — HEPARIN SODIUM 5000 UNITS: 5000 INJECTION INTRAVENOUS; SUBCUTANEOUS at 10:05

## 2022-05-03 RX ADMIN — DEXAMETHASONE SODIUM PHOSPHATE 4 MG: 4 INJECTION INTRA-ARTICULAR; INTRALESIONAL; INTRAMUSCULAR; INTRAVENOUS; SOFT TISSUE at 10:05

## 2022-05-03 RX ADMIN — MUPIROCIN: 20 OINTMENT TOPICAL at 09:05

## 2022-05-03 RX ADMIN — QUETIAPINE FUMARATE 25 MG: 25 TABLET ORAL at 05:05

## 2022-05-03 RX ADMIN — DEXTROSE: 5 SOLUTION INTRAVENOUS at 11:05

## 2022-05-03 RX ADMIN — SENNOSIDES AND DOCUSATE SODIUM 1 TABLET: 50; 8.6 TABLET ORAL at 09:05

## 2022-05-03 NOTE — CONSULTS
[facility] - Neurosurgery ([facility])  [facility] Medicine  Consult Note    Patient Name: Diego Gunter  MRN: [id number]  Admission Date: 4/28/2022  Hospital Length of Stay: 5 days  Attending Physician: Carlos Miller MD   Primary Care Provider: Portia Ferreira MD           Patient information was obtained from relative(s), past medical records and ER records.     Inpatient consult to [facility] Medicine-General  Consult performed by: Twyla Preciado MD  Consult ordered by: Nicole Colbert PA-C        Subjective:     Principal Problem: Epidural hematoma    Chief Complaint:   Chief Complaint   Patient presents with    Back Pain     Here via [facility] with c/o upper back pain and right arm pain, taking warfarin and azithromycin, Hx PE and mycobacterium    Transfer     [facility] [place], [place] transfer for epidural abscess        HPI: Mr. Guntre is a 81 year-old male with antiphospholipid syndrome (diagnosed 11/2021, on warfarin), PE, HLD, MAC (diagnosed in 2018, on chronic ethambutol and azithromycin), previous prostate ca (diagnosed in 2011, s/p prostatectomy) who presented to the hospital on 04/28 with R-sided weakness. Stroke workup was negative, but imaging was consistent with a compressive epidural hematoma ni the cervical spine C3-C6. He underwent spinal decompression and fluid evacuation with NSGY on 04/28. Since admitted, the Hematology/Oncology service was consulted- recommended not resuming therapeutic anticoagulation for APLS given current bleeding event and starting ppx anticoagulation when deemed safe by nsgy. ID was also consulted for recommendations regarding whether the epidural fluid collection could be an infectious collection- recommended starting dapto until path returned. Hospital course has been complicated by shock with intermittent pressor requirements (off since 05/01) and waxing/waning encephalopathy. He was noted to have acute changes in mental status around 05/01 which prompted a workup- EEG was obtained  "without epileptiform changes but with evidence of diffuse background slowing. Medicine consulted for "medical management; APS previously on coumadin; post op delirium".      Past Medical History:   Diagnosis Date    Antiphospholipid syndrome 11/19/2021    Dysphagia     Hx of colonic polyps     followed by GI. Dr. Pham    Hyperlipidemia LDL goal <100     Overweight(278.02)     Prostate cancer september 2011    followed by urology, Dr. Rogers    Type II or unspecified type diabetes mellitus without mention of complication, not stated as uncontrolled     diet controlled     Past Surgical History:   Procedure Laterality Date    BRONCHOSCOPY N/A 10/15/2018    Procedure: BRONCHOSCOPY;  Surgeon: Pratibha Diagnostic Provider;  Location: [facility] OR [address] [place] [address];  Service: Anesthesiology;  Laterality: N/A;    CATARACT EXTRACTION, BILATERAL  2014    POSTERIOR CERVICAL LAMINECTOMY Bilateral 4/28/2022    Procedure: LAMINECTOMY, SPINE, CERVICAL, POSTERIOR APPROACH C3-6;  Surgeon: Carlos Miller MD;  Location: [facility] OR [address] [place] [address];  Service: Neurosurgery;  Laterality: Bilateral;    PROSTATECTOMY       Review of patient's allergies indicates:  No Known Allergies    No current facility-administered medications on file prior to encounter.     Current Outpatient Medications on File Prior to Encounter   Medication Sig    albuterol (PROVENTIL/VENTOLIN HFA) 90 mcg/actuation inhaler INHALE 2 PUFFS BY MOUTH EVERY 6 HOURS AS NEEDED FOR WHEEZING    ascorbic acid, vitamin C, (VITAMIN C) 1000 MG tablet Take 1,000 mg by mouth once daily.    aspirin (ECOTRIN) 81 MG EC tablet Take 81 mg by mouth.    b complex vitamins capsule Take 1 capsule by mouth once daily.    ethambutoL (MYAMBUTOL) 400 MG Tab Take 2 tablets (800 mg total) by mouth once daily.    mucus clearing device (AEROBIKA OSCILLATING PEP SYSTM) by Misc.(Non-Drug; Combo Route) route 2 (two) times a day.    nebulizer and compressor (OMBRA COMPRESSOR SYSTEM) Marcy use to administer " nebulized medication as directed    omega 3-dha-epa-fish oil 1,000 (120-180) mg Cap Take 1 capsule by mouth once daily.    omega-3 fatty acids 1,000 mg Cap Take 2 g by mouth.    omeprazole 20 mg TbEC 1/2 tablet daily in am as needed.    pravastatin (PRAVACHOL) 10 MG tablet Take 1 tablet (10 mg total) by mouth once daily.    promethazine-dextromethorphan (PROMETHAZINE-DM) 6.25-15 mg/5 mL Syrp Take 10-15ml by mouth every 8 hours as needed for coughing    sodium chloride 3% 3 % nebulizer solution USE 4 ML VIAL IN NEBULIZER  TWICE DAILY    warfarin (COUMADIN) 3 MG tablet Take 1.5 tablets (4.5 mg total) by mouth Daily. As directed by coumadin clinic     Family History       Problem Relation (Age of Onset)    Colon cancer Mother    Dementia Brother    Diabetes Mother, Sister, , Brother, Brother, Maternal Aunt    Heart disease Father    Lung cancer Brother    Mental illness Sister    Myasthenia gravis Brother    Other Brother    Parkinsonism Brother    Pulmonary fibrosis Brother          Tobacco Use    Smoking status: Former Smoker     Packs/day: 0.25     Years: 5.00     Pack years: 1.25     Quit date: 10/2/1962     Years since quittin.6    Smokeless tobacco: Never Used    Tobacco comment: quit age 23   Substance and Sexual Activity    Alcohol use: Yes     Comment: beer occasionally    Drug use: No    Sexual activity: Yes     Partners: Female     Review of Systems   Constitutional:  Positive for fatigue. Negative for chills and fever.   HENT:  Negative for congestion and sore throat.    Respiratory:  Negative for cough and shortness of breath.    Cardiovascular:  Negative for chest pain, palpitations and leg swelling.   Gastrointestinal:  Positive for abdominal distention and constipation. Negative for abdominal pain, diarrhea, nausea and vomiting.   Genitourinary:  Negative for dysuria and frequency.   Musculoskeletal:  Negative for back pain and myalgias.   Skin:  Negative for color change and rash.    Neurological:  Negative for light-headedness and headaches.   Objective:     Vital Signs (Most Recent):  Temp: 98.5 °F (36.9 °C) (05/03/22 1144)  Pulse: 66 (05/03/22 1144)  Resp: 18 (05/03/22 1144)  BP: 118/62 (05/03/22 1144)  SpO2: (!) 94 % (05/03/22 1144)   Vital Signs (24h Range):  Temp:  [97.3 °F (36.3 °C)-98.5 °F (36.9 °C)] 98.5 °F (36.9 °C)  Pulse:  [62-81] 66  Resp:  [14-46] 18  SpO2:  [84 %-96 %] 94 %  BP: (113-150)/(60-78) 118/62     Weight: 74.8 kg (165 lb)  Body mass index is 24.37 kg/m².    Physical Exam  Constitutional:       General: He is not in acute distress.     Appearance: He is not ill-appearing or toxic-appearing.   HENT:      Head: Normocephalic and atraumatic.      Nose: No congestion or rhinorrhea.      Mouth/Throat:      Pharynx: No oropharyngeal exudate or posterior oropharyngeal erythema.   Eyes:      General: No scleral icterus.     Conjunctiva/sclera: Conjunctivae normal.   Cardiovascular:      Rate and Rhythm: Normal rate and regular rhythm.   Pulmonary:      Effort: Pulmonary effort is normal.      Breath sounds: Normal breath sounds.   Abdominal:      General: There is distension.      Tenderness: There is no abdominal tenderness. There is no guarding or rebound.   Musculoskeletal:         General: No swelling.      Right lower leg: No edema.      Left lower leg: No edema.   Skin:     General: Skin is warm.      Coloration: Skin is not jaundiced.   Neurological:      General: No focal deficit present.      Mental Status: He is alert.      Comments: Oriented to person, intermittently to time and place  Follows commands appropriately  Strength 4/5 throughout  Sensation intact throughout     Significant Labs: All pertinent labs within the past 24 hours have been reviewed.    Significant Imaging: I have reviewed all pertinent imaging results/findings within the past 24 hours.    Assessment/Plan:     * Epidural hematoma  --continue management per primary      Acute encephalopathy    Jani is a 81 year-old male with antiphospholipid syndrome (diagnosed 11/2021, on warfarin), PE, HLD, MAC (diagnosed in 2018, on chronic ethambutol and azithromycin), previous prostate ca (diagnosed in 2011, s/p prostatectomy) who presented on 04/28 with an epidural hematoma in C3-C6. He is s/p spinal decompression and fluid evacuation with NSGY on 04/28. Course has been complicated by shock with intermittent pressor requirements (off pressors since 05/01) and waxing/waning encephalopathy, for which medicine was consulted for.     Suspect encephalopathy is multifactorial. Contributing factors include delirium vs medications (on scheduled methocarbamol and pregabalin, and started on steroids yesterday) vs electrolyte imbalances (hypernatremic on labs yesterday and today) vs infection (new leukocytosis). As part of the workup, he has undergone video EEG monitoring from 05/01-05/02 without epileptiform changes.     --would order a CTH to evaluate for changes from baseline  --recommend adjusting pain medication regimen; would discontinue pregabalin and methocarbamol as these can further cause encephalopathy- can try lidocaine patches and scheduled tylenol as alternatives  --ordered an infectious workup; will follow and determine plans based on findings  --started on continuous D5 for correction of hypernatremia; ordered 3:30 pm BMP to ensure appropriate correction  --would additionally start on a bowel regimen given 6 days of constipation    History of pulmonary embolism  --hold therapeutic anticoagulation at this time, per Heme/Onc recs, given recent bleed    HILLARY (mycobacterium avium-intracellulare)  --continue ethambutol and azithromycin      Hyperlipidemia associated with type 2 diabetes mellitus  --continue statin        VTE Risk Mitigation (From admission, onward)         Ordered     heparin (porcine) injection 5,000 Units  Every 8 hours         04/29/22 0252     Reason for No Pharmacological VTE Prophylaxis   Once        Question:  Reasons:  Answer:  Risk of Bleeding    04/28/22 2058     IP VTE HIGH RISK PATIENT  Once         04/28/22 2058     Place sequential compression device  Until discontinued         04/28/22 2058              Thank you for your consult. I will follow-up with patient. Please contact us if you have any additional questions.    Twyla Preciado MD  Department of Hospital Medicine   [facility] - [facility] ([facility])

## 2022-05-03 NOTE — PT/OT/SLP PROGRESS
Occupational Therapy      Patient Name:  Diego Gunter   MRN:  [id number]    Patient not seen today secondary to patient LAYNE for Testing/imaging (xray/CT/MRI), x2 attempts (CT and ultrasound). Will follow-up as appropriate and scheduled.    5/3/2022

## 2022-05-03 NOTE — PLAN OF CARE
Problem: Adult Inpatient Plan of Care  Goal: Plan of Care Review  Outcome: Ongoing, Progressing  Flowsheets (Taken 5/3/2022 1546)  Plan of Care Reviewed With:   patient   spouse    Problem: Bowel Elimination Impaired (Stroke, Ischemic/Transient Ischemic Attack)  Goal: Effective Bowel Elimination  Outcome: Ongoing, Progressing  Intervention: Promote Effective Bowel Elimination  Flowsheets (Taken 5/3/2022 1546)  Bowel Elimination Management: enema given  Bowel Program: maintenance program followed   POC reviewed with the patient and they verbalized understanding. All comments and concerns addressed. Bed locked in lowest position with bed alarm set, call light within reach. Safety precautions maintained. Will continue to monitor for changes to POC and clinical condition.

## 2022-05-03 NOTE — PT/OT/SLP PROGRESS
Physical Therapy      Patient Name:  Diego Gunter   MRN:  [id number]    Patient not seen today secondary to patient LAYNE for Testing/imaging (xray/CT/MRI). Will follow-up as appropriate and scheduled.

## 2022-05-03 NOTE — ASSESSMENT & PLAN NOTE
80 y/o male with APS on coumadin, hx PE, MAC pneumonia, HLD with spontaneous C3-C6 epidural hematoma compressing the spinal cord    S/p  C3-C6 laminectomy and fusion for evacuation of epidural hematoma 4/28    Stepped down to NPU    -- CTH 4/29 no acute intracranial process   -- Post op MRI C spine satisfactory decompression with continued cord signal edema   --postop XR C spine hardware in good position   --CTH today ordered per medicine team for some confusion, pending  -- C-collar to be worn at all times   -- HV drains removed 4/30  -- MAP goals completed in ICU  -- History of antiphospholipid syndrome and PE: hold warfarin. Heme/onc following.   -- SLP recommending regular diet with nectar thick liquids  -- Dex to 4q8, continue to taper   -- Floyd dc'd. Voiding per condom catheter. Continue to monitor for signs of retention  -- Constipation: KUB without obstruction on 5/2, continues to have abdominal distention  -- Leukocytosis: BLE US, UA, CXR ordered. Will follow up. Afebrile.   -- PT/OT  -- Pending IPR  -- DVT prophylaxis: TEDs/SCDs/SQH

## 2022-05-03 NOTE — ASSESSMENT & PLAN NOTE
Mr. Gunter is a 81 year-old male with antiphospholipid syndrome (diagnosed 11/2021, on warfarin), PE, HLD, MAC (diagnosed in 2018, on chronic ethambutol and azithromycin), previous prostate ca (diagnosed in 2011, s/p prostatectomy) who presented on 04/28 with an epidural hematoma in C3-C6. He is s/p spinal decompression and fluid evacuation with NSGY on 04/28. Course has been complicated by shock with intermittent pressor requirements (off pressors since 05/01) and waxing/waning encephalopathy, for which medicine was consulted for.     Suspect encephalopathy is multifactorial. Contributing factors include delirium vs medications (on scheduled methocarbamol and pregabalin, and started on steroids yesterday) vs electrolyte imbalances (hypernatremic on labs yesterday and today) vs infection (new leukocytosis). As part of the workup, he has undergone video EEG monitoring from 05/01-05/02 without epileptiform changes.     --would order a CTH to evaluate for changes from baseline  --recommend adjusting pain medication regimen; would discontinue pregabalin and methocarbamol as these can further cause encephalopathy- can try lidocaine patches and scheduled tylenol as alternatives  --ordered an infectious workup; will follow and determine plans based on findings  --started on continuous D5 for correction of hypernatremia; ordered 3:30 pm BMP to ensure appropriate correction  --would additionally start on a bowel regimen given 6 days of constipation

## 2022-05-03 NOTE — NURSING
Patient transferred to NPU. Spouse at bedside. Patient mildly confused. No distress noted. Blood noted around penis from previous amaya trauma. No urine output at this time. Will continue to monitor patient

## 2022-05-03 NOTE — PROGRESS NOTES
[facility] - Neurosurgery ([facility])  Neurosurgery  Progress Note    Subjective:     History of Present Illness: Patient is a 82 y/o male with antiphospholipid syndrome on chronic coumadin, MAC pneumonia, HLD, and left PE diagnosed January 2021 who was transferred from  for compressive epidural hematoma in the cervical spine. Patient reports that he woke up in the middle of the night and could no move the right side of his body. He went to  ED and stroke code initiated. Patient was not given tPA but given ASA. CTA was negative for acute CVA but did show compressive fluid collective from C3-C6. MRI cervical spine confirmed. [place] [facility] [place]. He reports he cannot move his right arm out of the contracted position, he also cannot lift his right leg off the bed. He reports the right arm and leg are painful. No complaints on left side of body. Denies b/b dysfunction.       Post-Op Info:  Procedure(s) (LRB):  LAMINECTOMY, SPINE, CERVICAL, POSTERIOR APPROACH C3-6 (Bilateral)   5 Days Post-Op     Interval History: Patient in restraints for agitation overnight. Calm and oriented x 3 this AM. Strength improving. No Bmx1 week, enema ordered. Leukocytosis, infectious work up pending. Medicine consulted, appreciate their assistance. Worried about polypharmacy contributing to patient's mental status. Robaxin and Lyrica discontinued, scheduled tylenol and lidocaine patches ordered.     Medications:  Continuous Infusions:   dextrose 5 % 100 mL/hr at 05/03/22 1146     Scheduled Meds:   acetaminophen  1,000 mg Oral TID    albuterol-ipratropium  3 mL Nebulization Q6H    azithromycin  500 mg Oral Daily    DAPTOmycin (CUBICIN)  IV  8 mg/kg Intravenous Q24H    dexamethasone  4 mg Intravenous Q8H    ethambutoL  800 mg Oral Daily    famotidine  20 mg Oral BID    heparin (porcine)  5,000 Units Subcutaneous Q8H    LIDOcaine  1 patch Transdermal Q24H    polyethylene glycol  17 g Oral  Daily    pravastatin  10 mg Oral Daily    QUEtiapine  25 mg Oral QHS    senna-docusate 8.6-50 mg  1 tablet Oral BID    silodosin  4 mg Oral Daily    sodium chloride 3%  4 mL Nebulization BID     PRN Meds:acetaminophen, dextrose 10%, dextrose 10%, glucagon (human recombinant), insulin aspart U-100, melatonin, metoclopramide HCl, ondansetron, oxyCODONE     Review of Systems  Objective:     Weight: 74.8 kg (165 lb)  Body mass index is 24.37 kg/m².  Vital Signs (Most Recent):  Temp: 98.5 °F (36.9 °C) (05/03/22 1144)  Pulse: 66 (05/03/22 1144)  Resp: 18 (05/03/22 1144)  BP: 118/62 (05/03/22 1144)  SpO2: (!) 94 % (05/03/22 1144)   Vital Signs (24h Range):  Temp:  [97.3 °F (36.3 °C)-98.5 °F (36.9 °C)] 98.5 °F (36.9 °C)  Pulse:  [62-81] 66  Resp:  [14-46] 18  SpO2:  [84 %-96 %] 94 %  BP: (113-150)/(60-78) 118/62                          Physical Exam    Neurosurgery Physical Exam  General: well developed, well nourished, no distress.   Head: normocephalic, atraumatic  Neurologic: Alert and oriented. Thought content somewhat appropriate. Some conversation hard to follow, patient with higher level confusion  GCS: Motor: 6/Verbal: 5/Eyes: 4 GCS Total: 15  Mental Status: Awake, Alert, Oriented x 3 (not to situation)  Language: No aphasia  Speech: No dysarthria  Cranial nerves: face symmetric, tongue midline, CN II-XII grossly intact.   Eyes: pupils equal, round, reactive to light with accommodation, EOMI.   Pulmonary: normal respirations, no signs of respiratory distress  Abdomen: soft,slightly distended  Skin: Skin is warm, dry and intact.  Sensory: intact to light touch throughout    Motor Strength:Moves all extremities spontaneously with good tone.  Full strength upper and lower extremities. No abnormal movements seen.     Strength  Deltoids Triceps Biceps Wrist Extension Wrist Flexion Hand    Upper: R 5/5 5/5 5/5 5/5 5/5 5/5    L 5/5 5/5 5/5 5/5 5/5 5/5     Iliopsoas Quadriceps Knee  Flexion Tibialis  anterior  Gastro- cnemius EHL   Lower: R 5/5 5/5 5/5 5/5 5/5 5/5    L 5/5 5/5 5/5 5/5 5/5 5/5     Posterior cervical incision: Clean, dry, staples intact. Skin edges well approximated. No surrounding erythema or edema. No drainage or TTP.       Significant Labs:  Recent Labs   Lab 05/02/22 0053 05/03/22  0821   * 128*   * 146*   K 3.5 3.6   * 110   CO2 27 27   BUN 14 17   CREATININE 0.7 0.8   CALCIUM 8.4* 8.9   MG 2.1 2.0     Recent Labs   Lab 05/02/22 0053 05/03/22  0821   WBC 13.19* 19.62*   HGB 9.7* 10.9*   HCT 29.5* 32.8*    236     Recent Labs   Lab 05/02/22 0053 05/03/22  0821   INR 1.1 1.1   APTT 33.3* 27.3     Microbiology Results (last 7 days)       Procedure Component Value Units Date/Time    Blood culture [[id number]]     Order Status: Sent Specimen: Blood     Blood culture [[id number]]     Order Status: Sent Specimen: Blood     Blood culture [[id number]] Collected: 04/28/22 1201    Order Status: Completed Specimen: Blood from Peripheral, Forearm, Right Updated: 05/02/22 1303     Blood Culture, Routine No Growth after 4 days.     Blood culture [[id number]] Collected: 04/28/22 1201    Order Status: Completed Specimen: Blood from Peripheral, Hand, Left Updated: 05/02/22 1303     Blood Culture, Routine No Growth after 4 days.           All pertinent labs from the last 24 hours have been reviewed.    Significant Diagnostics:  No results found in the last 24 hours.      Assessment/Plan:     * Epidural hematoma  80 y/o male with APS on coumadin, hx PE, MAC pneumonia, HLD with spontaneous C3-C6 epidural hematoma compressing the spinal cord    S/p  C3-C6 laminectomy and fusion for evacuation of epidural hematoma 4/28    Stepped down to NPU    -- CTH 4/29 no acute intracranial process   -- Post op MRI C spine satisfactory decompression with continued cord signal edema   --postop XR C spine hardware in good position   --CTH today ordered per medicine team for some confusion, pending  -- C-collar to  be worn at all times   -- HV drains removed 4/30  -- MAP goals completed in ICU  -- History of antiphospholipid syndrome and PE: hold warfarin. Heme/onc following.   -- SLP recommending regular diet with nectar thick liquids  -- Dex to 4q8, continue to taper   -- Floyd dc'd. Voiding per condom catheter. Continue to monitor for signs of retention  -- Constipation: KUB without obstruction on 5/2, continues to have abdominal distention  -- Leukocytosis: BLE US, UA, CXR ordered. Will follow up. Afebrile.   -- PT/OT  -- Pending IPR  -- DVT prophylaxis: TEDs/SCDs/SQH        Elysia Dexter PA-C  Neurosurgery  [facility] - Neurosurgery ([facility])

## 2022-05-03 NOTE — HPI
Diego Gunter is a 81-year-old male with PMHx of antiphospholipid syndrome on chronic coumadin for left PE diagnosed Jan 2021, DM2, prostate ca s/p prostatectomy, MAC pneumonia dx Fall 2018, mild cognitive impairment, and HLD. Patient [place] [facility] [place] for s/p C3-C6 decompression of epidural hematoma and posterior fusion.     Functional History: Patient lives in a single story home with 0 steps to enter.  Prior to admission, I. DME: none.
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
## 2022-05-03 NOTE — PLAN OF CARE
Problem: Adult Inpatient Plan of Care  Goal: Plan of Care Review  Outcome: Ongoing, Progressing  Goal: Patient-Specific Goal (Individualized)  Outcome: Ongoing, Progressing     Problem: Adjustment to Illness (Stroke, Ischemic/Transient Ischemic Attack)  Goal: Optimal Coping  Outcome: Ongoing, Progressing     Problem: Cerebral Tissue Perfusion (Stroke, Ischemic/Transient Ischemic Attack)  Goal: Optimal Cerebral Tissue Perfusion  Outcome: Ongoing, Progressing     Problem: Cognitive Impairment (Stroke, Ischemic/Transient Ischemic Attack)  Goal: Optimal Cognitive Function  Outcome: Ongoing, Progressing     Problem: Functional Ability Impaired (Stroke, Ischemic/Transient Ischemic Attack)  Goal: Optimal Functional Ability  Outcome: Ongoing, Progressing       Poc was reviewed with patient at bedside. Patient displayed confusion to place, time, and location through the night. Repeatedly removes lines and c-collar. Order was obtained for soft wrist restraints for patient safety. Restraints were reviewed with the patient and he verbalized his understanding. Rounding was preformed q1hr and vitals taken q4hr, all safety measures are maintained.

## 2022-05-03 NOTE — SUBJECTIVE & OBJECTIVE
Past Medical History:   Diagnosis Date    Antiphospholipid syndrome 11/19/2021    Dysphagia     Hx of colonic polyps     followed by GI. Dr. Pham    Hyperlipidemia LDL goal <100     Overweight(278.02)     Prostate cancer september 2011    followed by urology, Dr. Rogers    Type II or unspecified type diabetes mellitus without mention of complication, not stated as uncontrolled     diet controlled     Past Surgical History:   Procedure Laterality Date    BRONCHOSCOPY N/A 10/15/2018    Procedure: BRONCHOSCOPY;  Surgeon: Pratibha Diagnostic Provider;  Location: [facility] OR [address] [place] [address];  Service: Anesthesiology;  Laterality: N/A;    CATARACT EXTRACTION, BILATERAL  2014    POSTERIOR CERVICAL LAMINECTOMY Bilateral 4/28/2022    Procedure: LAMINECTOMY, SPINE, CERVICAL, POSTERIOR APPROACH C3-6;  Surgeon: Carlos Miller MD;  Location: [facility] OR [address] [place] [address];  Service: Neurosurgery;  Laterality: Bilateral;    PROSTATECTOMY       Review of patient's allergies indicates:  No Known Allergies    No current facility-administered medications on file prior to encounter.     Current Outpatient Medications on File Prior to Encounter   Medication Sig    albuterol (PROVENTIL/VENTOLIN HFA) 90 mcg/actuation inhaler INHALE 2 PUFFS BY MOUTH EVERY 6 HOURS AS NEEDED FOR WHEEZING    ascorbic acid, vitamin C, (VITAMIN C) 1000 MG tablet Take 1,000 mg by mouth once daily.    aspirin (ECOTRIN) 81 MG EC tablet Take 81 mg by mouth.    b complex vitamins capsule Take 1 capsule by mouth once daily.    ethambutoL (MYAMBUTOL) 400 MG Tab Take 2 tablets (800 mg total) by mouth once daily.    mucus clearing device (AEROBIKA OSCILLATING PEP SYSTM) by Misc.(Non-Drug; Combo Route) route 2 (two) times a day.    nebulizer and compressor (OMBRA COMPRESSOR SYSTEM) Marcy use to administer nebulized medication as directed    omega 3-dha-epa-fish oil 1,000 (120-180) mg Cap Take 1 capsule by mouth once daily.    omega-3 fatty acids 1,000 mg Cap Take 2 g by mouth.    omeprazole 20 mg  TbEC 1/2 tablet daily in am as needed.    pravastatin (PRAVACHOL) 10 MG tablet Take 1 tablet (10 mg total) by mouth once daily.    promethazine-dextromethorphan (PROMETHAZINE-DM) 6.25-15 mg/5 mL Syrp Take 10-15ml by mouth every 8 hours as needed for coughing    sodium chloride 3% 3 % nebulizer solution USE 4 ML VIAL IN NEBULIZER  TWICE DAILY    warfarin (COUMADIN) 3 MG tablet Take 1.5 tablets (4.5 mg total) by mouth Daily. As directed by coumadin clinic     Family History       Problem Relation (Age of Onset)    Colon cancer Mother    Dementia Brother    Diabetes Mother, Sister, , Brother, Brother, Maternal Aunt    Heart disease Father    Lung cancer Brother    Mental illness Sister    Myasthenia gravis Brother    Other Brother    Parkinsonism Brother    Pulmonary fibrosis Brother          Tobacco Use    Smoking status: Former Smoker     Packs/day: 0.25     Years: 5.00     Pack years: 1.25     Quit date: 10/2/1962     Years since quittin.6    Smokeless tobacco: Never Used    Tobacco comment: quit age 23   Substance and Sexual Activity    Alcohol use: Yes     Comment: beer occasionally    Drug use: No    Sexual activity: Yes     Partners: Female     Review of Systems   Constitutional:  Positive for fatigue. Negative for chills and fever.   HENT:  Negative for congestion and sore throat.    Respiratory:  Negative for cough and shortness of breath.    Cardiovascular:  Negative for chest pain, palpitations and leg swelling.   Gastrointestinal:  Positive for abdominal distention and constipation. Negative for abdominal pain, diarrhea, nausea and vomiting.   Genitourinary:  Negative for dysuria and frequency.   Musculoskeletal:  Negative for back pain and myalgias.   Skin:  Negative for color change and rash.   Neurological:  Negative for light-headedness and headaches.   Objective:     Vital Signs (Most Recent):  Temp: 98.5 °F (36.9 °C) (22 1144)  Pulse: 66 (22 1144)  Resp: 18 (22 1144)  BP: 118/62  (05/03/22 1144)  SpO2: (!) 94 % (05/03/22 1144)   Vital Signs (24h Range):  Temp:  [97.3 °F (36.3 °C)-98.5 °F (36.9 °C)] 98.5 °F (36.9 °C)  Pulse:  [62-81] 66  Resp:  [14-46] 18  SpO2:  [84 %-96 %] 94 %  BP: (113-150)/(60-78) 118/62     Weight: 74.8 kg (165 lb)  Body mass index is 24.37 kg/m².    Physical Exam  Constitutional:       General: He is not in acute distress.     Appearance: He is not ill-appearing or toxic-appearing.   HENT:      Head: Normocephalic and atraumatic.      Nose: No congestion or rhinorrhea.      Mouth/Throat:      Pharynx: No oropharyngeal exudate or posterior oropharyngeal erythema.   Eyes:      General: No scleral icterus.     Conjunctiva/sclera: Conjunctivae normal.   Cardiovascular:      Rate and Rhythm: Normal rate and regular rhythm.   Pulmonary:      Effort: Pulmonary effort is normal.      Breath sounds: Normal breath sounds.   Abdominal:      General: There is distension.      Tenderness: There is no abdominal tenderness. There is no guarding or rebound.   Musculoskeletal:         General: No swelling.      Right lower leg: No edema.      Left lower leg: No edema.   Skin:     General: Skin is warm.      Coloration: Skin is not jaundiced.   Neurological:      General: No focal deficit present.      Mental Status: He is alert.      Comments: Oriented to person, intermittently to time and place  Follows commands appropriately  Strength 4/5 throughout  Sensation intact throughout     Significant Labs: All pertinent labs within the past 24 hours have been reviewed.    Significant Imaging: I have reviewed all pertinent imaging results/findings within the past 24 hours.

## 2022-05-03 NOTE — CONSULTS
Inpatient consult to Physical Medicine Rehab  Consult performed by: Marilee Kowalski NP  Consult ordered by: Donato Smith MD  Reason for consult: Assess rehab needs      Reviewed patient history and current admission.  Rehab team following.  Full consult to follow.    KIERA Watts, FNP-C  Physical Medicine & Rehabilitation   05/03/2022

## 2022-05-03 NOTE — CONSULTS
[facility] - Neurosurgery ([facility])  Physical Medicine & Rehab  Consult Note    Patient Name: Diego Gunter  MRN: [id number]  Admission Date: 4/28/2022  Hospital Length of Stay: 5 days  Attending Physician: Carlos Miller MD    Inpatient consult to Physical Medicine & Rehabilitation  Consult performed by: Marilee Kowalski NP  Consult requested by:  Carlos Miller MD    Collaborating Physician: Ayse Montilla MD  Reason for Consult:  Assess rehabilitation needs     Consults  Subjective:     Principal Problem: Epidural hematoma    HPI: Diego Gunter is a 81-year-old male with PMHx of antiphospholipid syndrome on chronic coumadin for left PE diagnosed Jan 2021, DM2, prostate ca s/p prostatectomy, MAC pneumonia dx Fall 2018, mild cognitive impairment, and HLD. Patient presented to the hospital on 4/28 with R-sided weakness. Stroke workup was negative, but imaging was consistent with a compressive epidural hematoma ni the cervical spine C3-C6. s/p C3-C6 decompression of epidural hematoma and posterior fusion.     Functional History: Patient lives in a single story home with 0 steps to enter.  Prior to admission, I. DME: none.       Hospital Course: 5/2/22: Ambulated 6 steps modA x 2 ppl.        Past Medical History:   Diagnosis Date    Antiphospholipid syndrome 11/19/2021    Dysphagia     Hx of colonic polyps     followed by GI. Dr. Pham    Hyperlipidemia LDL goal <100     Overweight(278.02)     Prostate cancer september 2011    followed by urology, Dr. Rogers    Type II or unspecified type diabetes mellitus without mention of complication, not stated as uncontrolled     diet controlled     Past Surgical History:   Procedure Laterality Date    BRONCHOSCOPY N/A 10/15/2018    Procedure: BRONCHOSCOPY;  Surgeon: [facility] Diagnostic Provider;  Location: [facility] OR [address] [place] [address];  Service: Anesthesiology;  Laterality: N/A;    CATARACT EXTRACTION, BILATERAL  2014    POSTERIOR CERVICAL LAMINECTOMY Bilateral 4/28/2022     Procedure: LAMINECTOMY, SPINE, CERVICAL, POSTERIOR APPROACH C3-6;  Surgeon: Carlos Miller MD;  Location: [facility] OR [address] [place] [address];  Service: Neurosurgery;  Laterality: Bilateral;    PROSTATECTOMY       Review of patient's allergies indicates:  No Known Allergies    Scheduled Medications:    acetaminophen  1,000 mg Oral TID    albuterol-ipratropium  3 mL Nebulization Q6H    azithromycin  500 mg Oral Daily    DAPTOmycin (CUBICIN)  IV  8 mg/kg Intravenous Q24H    dexamethasone  4 mg Intravenous Q8H    ethambutoL  800 mg Oral Daily    famotidine  20 mg Oral BID    heparin (porcine)  5,000 Units Subcutaneous Q8H    LIDOcaine  1 patch Transdermal Q24H    polyethylene glycol  17 g Oral Daily    pravastatin  10 mg Oral Daily    QUEtiapine  25 mg Oral QHS    senna-docusate 8.6-50 mg  1 tablet Oral BID    silodosin  4 mg Oral Daily    sodium chloride 3%  4 mL Nebulization BID       PRN Medications: acetaminophen, dextrose 10%, dextrose 10%, glucagon (human recombinant), insulin aspart U-100, melatonin, metoclopramide HCl, ondansetron, oxyCODONE    Family History       Problem Relation (Age of Onset)    Colon cancer Mother    Dementia Brother    Diabetes Mother, Sister, , Brother, Brother, Maternal Aunt    Heart disease Father    Lung cancer Brother    Mental illness Sister    Myasthenia gravis Brother    Other Brother    Parkinsonism Brother    Pulmonary fibrosis Brother          Tobacco Use    Smoking status: Former Smoker     Packs/day: 0.25     Years: 5.00     Pack years: 1.25     Quit date: 10/2/1962     Years since quittin.6    Smokeless tobacco: Never Used    Tobacco comment: quit age 23   Substance and Sexual Activity    Alcohol use: Yes     Comment: beer occasionally    Drug use: No    Sexual activity: Yes     Partners: Female     Review of Systems   Constitutional:  Positive for activity change.   Musculoskeletal:  Positive for gait problem.   Neurological:  Positive for weakness.    Psychiatric/Behavioral:  Positive for behavioral problems and confusion.    Objective:     Vital Signs (Most Recent):  Temp: 98.5 °F (36.9 °C) (05/03/22 1144)  Pulse: 60 (05/03/22 1500)  Resp: 18 (05/03/22 1144)  BP: 118/62 (05/03/22 1144)  SpO2: (!) 94 % (05/03/22 1144)      Vital Signs (24h Range):  Temp:  [97.3 °F (36.3 °C)-98.5 °F (36.9 °C)] 98.5 °F (36.9 °C)  Pulse:  [60-81] 60  Resp:  [16-46] 18  SpO2:  [92 %-96 %] 94 %  BP: (113-140)/(62-78) 118/62     Body mass index is 24.37 kg/m².    Physical Exam  Vitals and nursing note reviewed.   Constitutional:       Appearance: He is well-developed.   HENT:      Head: Normocephalic and atraumatic.   Eyes:      General:         Right eye: No discharge.         Left eye: No discharge.      Pupils: Pupils are equal, round, and reactive to light.   Pulmonary:      Effort: Pulmonary effort is normal. No respiratory distress.   Abdominal:      General: There is no distension.      Palpations: Abdomen is soft.      Tenderness: There is no abdominal tenderness.   Musculoskeletal:         General: No deformity.      Cervical back: Neck supple.      Comments: BLE weakness present   Skin:     General: Skin is warm and dry.   Neurological:      Mental Status: He is alert.      Sensory: No sensory deficit.      Motor: Weakness present. No abnormal muscle tone.      Comments: - confusion present   Psychiatric:         Behavior: Behavior normal.      Comments: - BUE restraints   - camera sitter     Diagnostic Results:   Labs: Reviewed  ECG: Reviewed  CT: Reviewed    Assessment/Plan:     * Epidural hematoma  - s/p C3-C6 decompression of epidural hematoma and posterior fusion on 4/28     Impaired mobility  - Related to prolonged/acute hospital course.     Recommendations  -  Encourage mobility, OOB in chair at least 3 hours per day, and early ambulation as appropriate  -  PT/OT evaluate and treat  -  Pain management  -  Monitor for and prevent skin breakdown and pressure  ulcers  · Early mobility, repositioning/weight shifting every 20-30 minutes when sitting, turn patient every 2 hours, proper mattress/overlay and chair cushioning, pressure relief/heel protector boots  -  DVT prophylaxis    -  Reviewed discharge options (IP rehab, SNF, HH therapy, and OP therapy)    PM&R Recommendation:     At this time, the PM&R team has reviewed this patient's ongoing medical case including inpatient diagnosis, medical history, clinical examination, labs, vitals, current social and functional history.    We will continue to follow for improvement in mentation and removal of restraints and camera sitter.     Thank you for your consult.     Marilee Kowalski NP  Department of Physical Medicine & Rehab  [facility] - Neurosurgery ([facility])

## 2022-05-03 NOTE — SUBJECTIVE & OBJECTIVE
Past Medical History:   Diagnosis Date    Antiphospholipid syndrome 11/19/2021    Dysphagia     Hx of colonic polyps     followed by GI. Dr. Pham    Hyperlipidemia LDL goal <100     Overweight(278.02)     Prostate cancer september 2011    followed by urology, Dr. Rogers    Type II or unspecified type diabetes mellitus without mention of complication, not stated as uncontrolled     diet controlled     Past Surgical History:   Procedure Laterality Date    BRONCHOSCOPY N/A 10/15/2018    Procedure: BRONCHOSCOPY;  Surgeon: [facility] Diagnostic Provider;  Location: [facility] OR [address] [place] [address];  Service: Anesthesiology;  Laterality: N/A;    CATARACT EXTRACTION, BILATERAL  2014    POSTERIOR CERVICAL LAMINECTOMY Bilateral 4/28/2022    Procedure: LAMINECTOMY, SPINE, CERVICAL, POSTERIOR APPROACH C3-6;  Surgeon: Carlos Miller MD;  Location: [facility] OR [address] [place] [address];  Service: Neurosurgery;  Laterality: Bilateral;    PROSTATECTOMY       Review of patient's allergies indicates:  No Known Allergies    Scheduled Medications:    acetaminophen  1,000 mg Oral TID    albuterol-ipratropium  3 mL Nebulization Q6H    azithromycin  500 mg Oral Daily    DAPTOmycin (CUBICIN)  IV  8 mg/kg Intravenous Q24H    dexamethasone  4 mg Intravenous Q8H    ethambutoL  800 mg Oral Daily    famotidine  20 mg Oral BID    heparin (porcine)  5,000 Units Subcutaneous Q8H    LIDOcaine  1 patch Transdermal Q24H    polyethylene glycol  17 g Oral Daily    pravastatin  10 mg Oral Daily    QUEtiapine  25 mg Oral QHS    senna-docusate 8.6-50 mg  1 tablet Oral BID    silodosin  4 mg Oral Daily    sodium chloride 3%  4 mL Nebulization BID       PRN Medications: acetaminophen, dextrose 10%, dextrose 10%, glucagon (human recombinant), insulin aspart U-100, melatonin, metoclopramide HCl, ondansetron, oxyCODONE    Family History       Problem Relation (Age of Onset)    Colon cancer Mother    Dementia Brother    Diabetes Mother, Sister, , Brother, Brother, Maternal Aunt    Heart disease  Father    Lung cancer Brother    Mental illness Sister    Myasthenia gravis Brother    Other Brother    Parkinsonism Brother    Pulmonary fibrosis Brother          Tobacco Use    Smoking status: Former Smoker     Packs/day: 0.25     Years: 5.00     Pack years: 1.25     Quit date: 10/2/1962     Years since quittin.6    Smokeless tobacco: Never Used    Tobacco comment: quit age 23   Substance and Sexual Activity    Alcohol use: Yes     Comment: beer occasionally    Drug use: No    Sexual activity: Yes     Partners: Female     Review of Systems   Constitutional:  Positive for activity change.   Musculoskeletal:  Positive for gait problem.   Neurological:  Positive for weakness.   Psychiatric/Behavioral:  Positive for behavioral problems and confusion.    Objective:     Vital Signs (Most Recent):  Temp: 98.5 °F (36.9 °C) (22 1144)  Pulse: 60 (22 1500)  Resp: 18 (22 1144)  BP: 118/62 (22 1144)  SpO2: (!) 94 % (22 1144)      Vital Signs (24h Range):  Temp:  [97.3 °F (36.3 °C)-98.5 °F (36.9 °C)] 98.5 °F (36.9 °C)  Pulse:  [60-81] 60  Resp:  [16-46] 18  SpO2:  [92 %-96 %] 94 %  BP: (113-140)/(62-78) 118/62     Body mass index is 24.37 kg/m².    Physical Exam  Vitals and nursing note reviewed.   Constitutional:       Appearance: He is well-developed.   HENT:      Head: Normocephalic and atraumatic.   Eyes:      General:         Right eye: No discharge.         Left eye: No discharge.      Pupils: Pupils are equal, round, and reactive to light.   Pulmonary:      Effort: Pulmonary effort is normal. No respiratory distress.   Abdominal:      General: There is no distension.      Palpations: Abdomen is soft.      Tenderness: There is no abdominal tenderness.   Musculoskeletal:         General: No deformity.      Cervical back: Neck supple.      Comments: BLE weakness present   Skin:     General: Skin is warm and dry.   Neurological:      Mental Status: He is alert.      Sensory: No sensory  deficit.      Motor: Weakness present. No abnormal muscle tone.      Comments: - confusion present   Psychiatric:         Behavior: Behavior normal.      Comments: - BUE restraints   - camera sitter     NEUROLOGICAL EXAMINATION:     CRANIAL NERVES     CN III, IV, VI   Pupils are equal, round, and reactive to light.    Diagnostic Results: Labs: Reviewed  ECG: Reviewed  CT: Reviewed

## 2022-05-03 NOTE — SUBJECTIVE & OBJECTIVE
Interval History: Patient in restraints for agitation overnight. Calm and oriented x 3 this AM. Strength improving. No Bmx1 week, enema ordered. Leukocytosis, infectious work up pending. Medicine consulted, appreciate their assistance. Worried about polypharmacy contributing to patient's mental status. Robaxin and Lyrica discontinued, scheduled tylenol and lidocaine patches ordered.     Medications:  Continuous Infusions:   dextrose 5 % 100 mL/hr at 05/03/22 1146     Scheduled Meds:   acetaminophen  1,000 mg Oral TID    albuterol-ipratropium  3 mL Nebulization Q6H    azithromycin  500 mg Oral Daily    DAPTOmycin (CUBICIN)  IV  8 mg/kg Intravenous Q24H    dexamethasone  4 mg Intravenous Q8H    ethambutoL  800 mg Oral Daily    famotidine  20 mg Oral BID    heparin (porcine)  5,000 Units Subcutaneous Q8H    LIDOcaine  1 patch Transdermal Q24H    polyethylene glycol  17 g Oral Daily    pravastatin  10 mg Oral Daily    QUEtiapine  25 mg Oral QHS    senna-docusate 8.6-50 mg  1 tablet Oral BID    silodosin  4 mg Oral Daily    sodium chloride 3%  4 mL Nebulization BID     PRN Meds:acetaminophen, dextrose 10%, dextrose 10%, glucagon (human recombinant), insulin aspart U-100, melatonin, metoclopramide HCl, ondansetron, oxyCODONE     Review of Systems  Objective:     Weight: 74.8 kg (165 lb)  Body mass index is 24.37 kg/m².  Vital Signs (Most Recent):  Temp: 98.5 °F (36.9 °C) (05/03/22 1144)  Pulse: 66 (05/03/22 1144)  Resp: 18 (05/03/22 1144)  BP: 118/62 (05/03/22 1144)  SpO2: (!) 94 % (05/03/22 1144)   Vital Signs (24h Range):  Temp:  [97.3 °F (36.3 °C)-98.5 °F (36.9 °C)] 98.5 °F (36.9 °C)  Pulse:  [62-81] 66  Resp:  [14-46] 18  SpO2:  [84 %-96 %] 94 %  BP: (113-150)/(60-78) 118/62                          Physical Exam    Neurosurgery Physical Exam  General: well developed, well nourished, no distress.   Head: normocephalic, atraumatic  Neurologic: Alert and oriented. Thought content somewhat appropriate. Some conversation  hard to follow, patient with higher level confusion  GCS: Motor: 6/Verbal: 5/Eyes: 4 GCS Total: 15  Mental Status: Awake, Alert, Oriented x 3 (not to situation)  Language: No aphasia  Speech: No dysarthria  Cranial nerves: face symmetric, tongue midline, CN II-XII grossly intact.   Eyes: pupils equal, round, reactive to light with accommodation, EOMI.   Pulmonary: normal respirations, no signs of respiratory distress  Abdomen: soft,slightly distended  Skin: Skin is warm, dry and intact.  Sensory: intact to light touch throughout    Motor Strength:Moves all extremities spontaneously with good tone.  Full strength upper and lower extremities. No abnormal movements seen.     Strength  Deltoids Triceps Biceps Wrist Extension Wrist Flexion Hand    Upper: R 5/5 5/5 5/5 5/5 5/5 5/5    L 5/5 5/5 5/5 5/5 5/5 5/5     Iliopsoas Quadriceps Knee  Flexion Tibialis  anterior Gastro- cnemius EHL   Lower: R 5/5 5/5 5/5 5/5 5/5 5/5    L 5/5 5/5 5/5 5/5 5/5 5/5     Posterior cervical incision: Clean, dry, staples intact. Skin edges well approximated. No surrounding erythema or edema. No drainage or TTP.       Significant Labs:  Recent Labs   Lab 05/02/22  0053 05/03/22  0821   * 128*   * 146*   K 3.5 3.6   * 110   CO2 27 27   BUN 14 17   CREATININE 0.7 0.8   CALCIUM 8.4* 8.9   MG 2.1 2.0     Recent Labs   Lab 05/02/22  0053 05/03/22  0821   WBC 13.19* 19.62*   HGB 9.7* 10.9*   HCT 29.5* 32.8*    236     Recent Labs   Lab 05/02/22  0053 05/03/22  0821   INR 1.1 1.1   APTT 33.3* 27.3     Microbiology Results (last 7 days)       Procedure Component Value Units Date/Time    Blood culture [[id number]]     Order Status: Sent Specimen: Blood     Blood culture [[id number]]     Order Status: Sent Specimen: Blood     Blood culture [[id number]] Collected: 04/28/22 1201    Order Status: Completed Specimen: Blood from Peripheral, Forearm, Right Updated: 05/02/22 1303     Blood Culture, Routine No Growth after 4 days.      Blood culture [[id number]] Collected: 04/28/22 1201    Order Status: Completed Specimen: Blood from Peripheral, Hand, Left Updated: 05/02/22 1303     Blood Culture, Routine No Growth after 4 days.           All pertinent labs from the last 24 hours have been reviewed.    Significant Diagnostics:  No results found in the last 24 hours.

## 2022-05-03 NOTE — NURSING
Patient displaying confusion to place, time, and location. Repeatedly removes lines and c-collar. Order was obtained for soft wrist restraints for patient safety. Restraints were reviewed with the patient and he verbalized his understanding.

## 2022-05-04 LAB
ALBUMIN SERPL BCP-MCNC: 3.2 G/DL (ref 3.5–5.2)
ALP SERPL-CCNC: 53 U/L (ref 55–135)
ALT SERPL W/O P-5'-P-CCNC: 44 U/L (ref 10–44)
ANION GAP SERPL CALC-SCNC: 9 MMOL/L (ref 8–16)
ANISOCYTOSIS BLD QL SMEAR: SLIGHT
APTT BLDCRRT: 29.2 SEC (ref 21–32)
AST SERPL-CCNC: 31 U/L (ref 10–40)
BASOPHILS # BLD AUTO: 0.05 K/UL (ref 0–0.2)
BASOPHILS NFR BLD: 0.3 % (ref 0–1.9)
BILIRUB SERPL-MCNC: 0.7 MG/DL (ref 0.1–1)
BUN SERPL-MCNC: 18 MG/DL (ref 8–23)
CALCIUM SERPL-MCNC: 8.3 MG/DL (ref 8.7–10.5)
CHLORIDE SERPL-SCNC: 106 MMOL/L (ref 95–110)
CO2 SERPL-SCNC: 29 MMOL/L (ref 23–29)
CREAT SERPL-MCNC: 0.9 MG/DL (ref 0.5–1.4)
DIFFERENTIAL METHOD: ABNORMAL
EOSINOPHIL # BLD AUTO: 0 K/UL (ref 0–0.5)
EOSINOPHIL NFR BLD: 0.2 % (ref 0–8)
ERYTHROCYTE [DISTWIDTH] IN BLOOD BY AUTOMATED COUNT: 16.2 % (ref 11.5–14.5)
EST. GFR  (AFRICAN AMERICAN): >60 ML/MIN/1.73 M^2
EST. GFR  (NON AFRICAN AMERICAN): >60 ML/MIN/1.73 M^2
FINAL PATHOLOGIC DIAGNOSIS: NORMAL
GLUCOSE SERPL-MCNC: 119 MG/DL (ref 70–110)
HCT VFR BLD AUTO: 35.6 % (ref 40–54)
HGB BLD-MCNC: 10.7 G/DL (ref 14–18)
HYPOCHROMIA BLD QL SMEAR: ABNORMAL
IMM GRANULOCYTES # BLD AUTO: 0.34 K/UL (ref 0–0.04)
IMM GRANULOCYTES NFR BLD AUTO: 1.9 % (ref 0–0.5)
INR PPP: 1.1 (ref 0.8–1.2)
LACTATE SERPL-SCNC: 1.8 MMOL/L (ref 0.5–2.2)
LIPASE SERPL-CCNC: 59 U/L (ref 4–60)
LYMPHOCYTES # BLD AUTO: 1.8 K/UL (ref 1–4.8)
LYMPHOCYTES NFR BLD: 10.2 % (ref 18–48)
Lab: NORMAL
MAGNESIUM SERPL-MCNC: 2.2 MG/DL (ref 1.6–2.6)
MCH RBC QN AUTO: 27 PG (ref 27–31)
MCHC RBC AUTO-ENTMCNC: 30.1 G/DL (ref 32–36)
MCV RBC AUTO: 90 FL (ref 82–98)
MONOCYTES # BLD AUTO: 2.1 K/UL (ref 0.3–1)
MONOCYTES NFR BLD: 12 % (ref 4–15)
NEUTROPHILS # BLD AUTO: 13.4 K/UL (ref 1.8–7.7)
NEUTROPHILS NFR BLD: 75.4 % (ref 38–73)
NRBC BLD-RTO: 0 /100 WBC
PHOSPHATE SERPL-MCNC: 4.4 MG/DL (ref 2.7–4.5)
PLATELET # BLD AUTO: 217 K/UL (ref 150–450)
PLATELET BLD QL SMEAR: ABNORMAL
PMV BLD AUTO: 10.9 FL (ref 9.2–12.9)
POCT GLUCOSE: 152 MG/DL (ref 70–110)
POCT GLUCOSE: 187 MG/DL (ref 70–110)
POIKILOCYTOSIS BLD QL SMEAR: SLIGHT
POLYCHROMASIA BLD QL SMEAR: ABNORMAL
POTASSIUM SERPL-SCNC: 3.7 MMOL/L (ref 3.5–5.1)
PROT SERPL-MCNC: 5.6 G/DL (ref 6–8.4)
PROTHROMBIN TIME: 11.2 SEC (ref 9–12.5)
RBC # BLD AUTO: 3.97 M/UL (ref 4.6–6.2)
SODIUM SERPL-SCNC: 144 MMOL/L (ref 136–145)
TROPONIN I SERPL DL<=0.01 NG/ML-MCNC: <0.006 NG/ML (ref 0–0.03)
WBC # BLD AUTO: 17.81 K/UL (ref 3.9–12.7)

## 2022-05-04 PROCEDURE — 84100 ASSAY OF PHOSPHORUS: CPT | Performed by: STUDENT IN AN ORGANIZED HEALTH CARE EDUCATION/TRAINING PROGRAM

## 2022-05-04 PROCEDURE — 83690 ASSAY OF LIPASE: CPT | Performed by: STUDENT IN AN ORGANIZED HEALTH CARE EDUCATION/TRAINING PROGRAM

## 2022-05-04 PROCEDURE — 99233 SBSQ HOSP IP/OBS HIGH 50: CPT | Mod: GC,,, | Performed by: HOSPITALIST

## 2022-05-04 PROCEDURE — 85610 PROTHROMBIN TIME: CPT | Performed by: STUDENT IN AN ORGANIZED HEALTH CARE EDUCATION/TRAINING PROGRAM

## 2022-05-04 PROCEDURE — 63600175 PHARM REV CODE 636 W HCPCS: Performed by: STUDENT IN AN ORGANIZED HEALTH CARE EDUCATION/TRAINING PROGRAM

## 2022-05-04 PROCEDURE — 25000242 PHARM REV CODE 250 ALT 637 W/ HCPCS: Performed by: STUDENT IN AN ORGANIZED HEALTH CARE EDUCATION/TRAINING PROGRAM

## 2022-05-04 PROCEDURE — 84484 ASSAY OF TROPONIN QUANT: CPT | Performed by: STUDENT IN AN ORGANIZED HEALTH CARE EDUCATION/TRAINING PROGRAM

## 2022-05-04 PROCEDURE — 25000003 PHARM REV CODE 250: Performed by: STUDENT IN AN ORGANIZED HEALTH CARE EDUCATION/TRAINING PROGRAM

## 2022-05-04 PROCEDURE — 93010 EKG 12-LEAD: ICD-10-PCS | Mod: 76,,, | Performed by: INTERNAL MEDICINE

## 2022-05-04 PROCEDURE — 97530 THERAPEUTIC ACTIVITIES: CPT

## 2022-05-04 PROCEDURE — 97112 NEUROMUSCULAR REEDUCATION: CPT

## 2022-05-04 PROCEDURE — 93005 ELECTROCARDIOGRAM TRACING: CPT

## 2022-05-04 PROCEDURE — 97116 GAIT TRAINING THERAPY: CPT

## 2022-05-04 PROCEDURE — 85025 COMPLETE CBC W/AUTO DIFF WBC: CPT | Performed by: STUDENT IN AN ORGANIZED HEALTH CARE EDUCATION/TRAINING PROGRAM

## 2022-05-04 PROCEDURE — 92507 TX SP LANG VOICE COMM INDIV: CPT

## 2022-05-04 PROCEDURE — 83735 ASSAY OF MAGNESIUM: CPT | Performed by: STUDENT IN AN ORGANIZED HEALTH CARE EDUCATION/TRAINING PROGRAM

## 2022-05-04 PROCEDURE — 99024 POSTOP FOLLOW-UP VISIT: CPT | Mod: ,,, | Performed by: PHYSICIAN ASSISTANT

## 2022-05-04 PROCEDURE — 83605 ASSAY OF LACTIC ACID: CPT | Performed by: STUDENT IN AN ORGANIZED HEALTH CARE EDUCATION/TRAINING PROGRAM

## 2022-05-04 PROCEDURE — 99024 PR POST-OP FOLLOW-UP VISIT: ICD-10-PCS | Mod: ,,, | Performed by: PHYSICIAN ASSISTANT

## 2022-05-04 PROCEDURE — 63600175 PHARM REV CODE 636 W HCPCS: Performed by: HOSPITALIST

## 2022-05-04 PROCEDURE — 92526 ORAL FUNCTION THERAPY: CPT

## 2022-05-04 PROCEDURE — 99232 SBSQ HOSP IP/OBS MODERATE 35: CPT | Mod: ,,, | Performed by: NURSE PRACTITIONER

## 2022-05-04 PROCEDURE — 94760 N-INVAS EAR/PLS OXIMETRY 1: CPT

## 2022-05-04 PROCEDURE — 99232 PR SUBSEQUENT HOSPITAL CARE,LEVL II: ICD-10-PCS | Mod: ,,, | Performed by: NURSE PRACTITIONER

## 2022-05-04 PROCEDURE — 25000003 PHARM REV CODE 250: Performed by: HOSPITALIST

## 2022-05-04 PROCEDURE — 94761 N-INVAS EAR/PLS OXIMETRY MLT: CPT

## 2022-05-04 PROCEDURE — 93010 ELECTROCARDIOGRAM REPORT: CPT | Mod: ,,, | Performed by: INTERNAL MEDICINE

## 2022-05-04 PROCEDURE — 63600175 PHARM REV CODE 636 W HCPCS: Performed by: PHYSICIAN ASSISTANT

## 2022-05-04 PROCEDURE — 80053 COMPREHEN METABOLIC PANEL: CPT | Performed by: STUDENT IN AN ORGANIZED HEALTH CARE EDUCATION/TRAINING PROGRAM

## 2022-05-04 PROCEDURE — 25000003 PHARM REV CODE 250: Performed by: PHYSICIAN ASSISTANT

## 2022-05-04 PROCEDURE — 97535 SELF CARE MNGMENT TRAINING: CPT

## 2022-05-04 PROCEDURE — 99233 PR SUBSEQUENT HOSPITAL CARE,LEVL III: ICD-10-PCS | Mod: GC,,, | Performed by: HOSPITALIST

## 2022-05-04 PROCEDURE — 63700000 PHARM REV CODE 250 ALT 637 W/O HCPCS: Performed by: STUDENT IN AN ORGANIZED HEALTH CARE EDUCATION/TRAINING PROGRAM

## 2022-05-04 PROCEDURE — 11000001 HC ACUTE MED/SURG PRIVATE ROOM

## 2022-05-04 PROCEDURE — 93010 ELECTROCARDIOGRAM REPORT: CPT | Mod: 76,,, | Performed by: INTERNAL MEDICINE

## 2022-05-04 PROCEDURE — 36415 COLL VENOUS BLD VENIPUNCTURE: CPT | Performed by: STUDENT IN AN ORGANIZED HEALTH CARE EDUCATION/TRAINING PROGRAM

## 2022-05-04 PROCEDURE — 85730 THROMBOPLASTIN TIME PARTIAL: CPT | Performed by: STUDENT IN AN ORGANIZED HEALTH CARE EDUCATION/TRAINING PROGRAM

## 2022-05-04 PROCEDURE — 94640 AIRWAY INHALATION TREATMENT: CPT

## 2022-05-04 RX ORDER — DEXAMETHASONE 1 MG/1
2 TABLET ORAL EVERY 8 HOURS
Status: DISPENSED | OUTPATIENT
Start: 2022-05-09 | End: 2022-05-10

## 2022-05-04 RX ORDER — DEXAMETHASONE 4 MG/1
4 TABLET ORAL EVERY 12 HOURS
Status: COMPLETED | OUTPATIENT
Start: 2022-05-04 | End: 2022-05-06

## 2022-05-04 RX ORDER — DEXAMETHASONE 1 MG/1
2 TABLET ORAL EVERY 6 HOURS
Status: COMPLETED | OUTPATIENT
Start: 2022-05-06 | End: 2022-05-08

## 2022-05-04 RX ORDER — DEXAMETHASONE 1 MG/1
2 TABLET ORAL EVERY 12 HOURS
Status: COMPLETED | OUTPATIENT
Start: 2022-05-10 | End: 2022-05-12

## 2022-05-04 RX ORDER — LACTULOSE 10 G/15ML
15 SOLUTION ORAL ONCE
Status: COMPLETED | OUTPATIENT
Start: 2022-05-04 | End: 2022-05-04

## 2022-05-04 RX ORDER — DEXAMETHASONE 1 MG/1
2 TABLET ORAL DAILY
Status: COMPLETED | OUTPATIENT
Start: 2022-05-13 | End: 2022-05-13

## 2022-05-04 RX ADMIN — HEPARIN SODIUM 5000 UNITS: 5000 INJECTION INTRAVENOUS; SUBCUTANEOUS at 03:05

## 2022-05-04 RX ADMIN — LIDOCAINE 1 PATCH: 50 PATCH CUTANEOUS at 11:05

## 2022-05-04 RX ADMIN — SILODOSIN 4 MG: 4 CAPSULE ORAL at 08:05

## 2022-05-04 RX ADMIN — METOCLOPRAMIDE 5 MG: 5 INJECTION, SOLUTION INTRAMUSCULAR; INTRAVENOUS at 09:05

## 2022-05-04 RX ADMIN — PRAVASTATIN SODIUM 10 MG: 10 TABLET ORAL at 08:05

## 2022-05-04 RX ADMIN — LACTULOSE 15 G: 20 SOLUTION ORAL at 10:05

## 2022-05-04 RX ADMIN — DAPTOMYCIN 600 MG: 500 INJECTION, POWDER, LYOPHILIZED, FOR SOLUTION INTRAVENOUS at 04:05

## 2022-05-04 RX ADMIN — ONDANSETRON 4 MG: 2 INJECTION INTRAMUSCULAR; INTRAVENOUS at 08:05

## 2022-05-04 RX ADMIN — IPRATROPIUM BROMIDE AND ALBUTEROL SULFATE 3 ML: 2.5; .5 SOLUTION RESPIRATORY (INHALATION) at 08:05

## 2022-05-04 RX ADMIN — ETHAMBUTOL HYDROCHLORIDE 800 MG: 400 TABLET, FILM COATED ORAL at 08:05

## 2022-05-04 RX ADMIN — PIPERACILLIN SODIUM AND TAZOBACTAM SODIUM 4.5 G: 4; .5 INJECTION, POWDER, FOR SOLUTION INTRAVENOUS at 05:05

## 2022-05-04 RX ADMIN — SENNOSIDES AND DOCUSATE SODIUM 1 TABLET: 50; 8.6 TABLET ORAL at 08:05

## 2022-05-04 RX ADMIN — IPRATROPIUM BROMIDE AND ALBUTEROL SULFATE 3 ML: 2.5; .5 SOLUTION RESPIRATORY (INHALATION) at 03:05

## 2022-05-04 RX ADMIN — IPRATROPIUM BROMIDE AND ALBUTEROL SULFATE 3 ML: 2.5; .5 SOLUTION RESPIRATORY (INHALATION) at 12:05

## 2022-05-04 RX ADMIN — Medication 6 MG: at 08:05

## 2022-05-04 RX ADMIN — AZITHROMYCIN MONOHYDRATE 500 MG: 250 TABLET ORAL at 08:05

## 2022-05-04 RX ADMIN — HEPARIN SODIUM 5000 UNITS: 5000 INJECTION INTRAVENOUS; SUBCUTANEOUS at 08:05

## 2022-05-04 RX ADMIN — ACETAMINOPHEN 1000 MG: 500 TABLET ORAL at 08:05

## 2022-05-04 RX ADMIN — FAMOTIDINE 20 MG: 20 TABLET ORAL at 08:05

## 2022-05-04 RX ADMIN — QUETIAPINE FUMARATE 25 MG: 25 TABLET ORAL at 05:05

## 2022-05-04 RX ADMIN — IPRATROPIUM BROMIDE AND ALBUTEROL SULFATE 3 ML: 2.5; .5 SOLUTION RESPIRATORY (INHALATION) at 07:05

## 2022-05-04 RX ADMIN — SODIUM CHLORIDE SOLN NEBU 3% 4 ML: 3 NEBU SOLN at 07:05

## 2022-05-04 RX ADMIN — DEXAMETHASONE 4 MG: 4 TABLET ORAL at 08:05

## 2022-05-04 RX ADMIN — POLYETHYLENE GLYCOL 3350 17 G: 17 POWDER, FOR SOLUTION ORAL at 08:05

## 2022-05-04 RX ADMIN — SODIUM CHLORIDE, SODIUM LACTATE, POTASSIUM CHLORIDE, AND CALCIUM CHLORIDE 2000 ML: .6; .31; .03; .02 INJECTION, SOLUTION INTRAVENOUS at 04:05

## 2022-05-04 NOTE — PLAN OF CARE
Problem: Adjustment to Illness (Delirium)  Goal: Optimal Coping  Outcome: Ongoing, Progressing     Problem: Attention and Thought Clarity Impairment (Delirium)  Goal: Improved Attention and Thought Clarity  Outcome: Ongoing, Progressing     Problem: Sleep Disturbance (Delirium)  Goal: Improved Sleep  Outcome: Ongoing, Progressing     Problem: Restraint, Nonbehavioral (Nonviolent)  Goal: Absence of Harm or Injury  Outcome: Ongoing, Progressing     Problem: Skin Injury Risk Increased  Goal: Skin Health and Integrity  Outcome: Ongoing, Progressing   POC reviewed with the patient and he verbalized understanding. All comments and concerns addressed. Patient had one episode of emesis through the night, PRN medication was administered for nausea. Bed locked in lowest position with bed alarm set, call light within reach. Safety precautions maintained. Rounding is preformed hourly and vitals taken q4h.

## 2022-05-04 NOTE — SUBJECTIVE & OBJECTIVE
Interval History 5/4/2022:  Patient is seen for follow-up PM&R evaluation and recommendations: Camera sitter at bs. Confusion still present. Alert. Participating with therapy.     HPI, Past Medical, Family, and Social History remains the same as documented in the initial encounter.    Scheduled Medications:    acetaminophen  1,000 mg Oral TID    albuterol-ipratropium  3 mL Nebulization Q6H    azithromycin  500 mg Oral Daily    DAPTOmycin (CUBICIN)  IV  8 mg/kg Intravenous Q24H    dexamethasone  4 mg Intravenous Q8H    ethambutoL  800 mg Oral Daily    famotidine  20 mg Oral BID    heparin (porcine)  5,000 Units Subcutaneous Q8H    lactulose  15 g Oral Once    LIDOcaine  1 patch Transdermal Q24H    polyethylene glycol  17 g Oral Daily    pravastatin  10 mg Oral Daily    QUEtiapine  25 mg Oral QHS    senna-docusate 8.6-50 mg  1 tablet Oral BID    silodosin  4 mg Oral Daily    sodium chloride 3%  4 mL Nebulization BID       Diagnostic Results: Labs: Reviewed  ECG: Reviewed  CT: Reviewed    PRN Medications: acetaminophen, dextrose 10%, dextrose 10%, glucagon (human recombinant), insulin aspart U-100, melatonin, metoclopramide HCl, ondansetron, oxyCODONE    Review of Systems   Constitutional:  Positive for activity change.   Musculoskeletal:  Positive for gait problem.   Neurological:  Positive for weakness.   Psychiatric/Behavioral:  Positive for behavioral problems and confusion.    Objective:     Vital Signs (Most Recent):  Temp: 97.6 °F (36.4 °C) (05/04/22 0721)  Pulse: 67 (05/04/22 0739)  Resp: 18 (05/04/22 0739)  BP: (!) 94/52 (05/04/22 0721)  SpO2: 97 % (05/04/22 0739)      Vital Signs (24h Range):  Temp:  [97.6 °F (36.4 °C)-98.5 °F (36.9 °C)] 97.6 °F (36.4 °C)  Pulse:  [60-70] 67  Resp:  [16-18] 18  SpO2:  [93 %-97 %] 97 %  BP: ()/(52-71) 94/52     Physical Exam  Vitals and nursing note reviewed.   Constitutional:       Appearance: He is well-developed.   HENT:      Head: Normocephalic and atraumatic.    Eyes:      General:         Right eye: No discharge.         Left eye: No discharge.      Pupils: Pupils are equal, round, and reactive to light.   Pulmonary:      Effort: Pulmonary effort is normal. No respiratory distress.   Abdominal:      General: There is no distension.      Palpations: Abdomen is soft.      Tenderness: There is no abdominal tenderness.   Musculoskeletal:         General: No deformity.      Cervical back: Neck supple.      Comments: BLE weakness present   Skin:     General: Skin is warm and dry.   Neurological:      Mental Status: He is alert.      Sensory: No sensory deficit.      Motor: Weakness present. No abnormal muscle tone.      Comments: - confusion present   Psychiatric:         Behavior: Behavior normal.      Comments: - BUE restraints   - camera sitter     NEUROLOGICAL EXAMINATION:     CRANIAL NERVES     CN III, IV, VI   Pupils are equal, round, and reactive to light.

## 2022-05-04 NOTE — PT/OT/SLP PROGRESS
Speech Language Pathology Treatment    Patient Name:  Diego Gunter   MRN:  [id number]  Admitting Diagnosis: Epidural hematoma    Recommendations:                 General Recommendations:  Dysphagia therapy, Speech/language therapy and Modified barium swallow study  Diet recommendations:  Regular, Liquid Diet Level: Nectar Thick   Aspiration Precautions: Assistance with meals and Assistance with thickening liquids, Meds whole 1 at a time, Small bites/sips and Standard aspiration precautions   General Precautions: Standard, aspiration, fall  Communication strategies:  go to room if call light pushed    Subjective     Patient awake and alert. Spouse present. Cleared with RN prior to session.     Pain/Comfort:  ·  No c/o pain.     Respiratory Status: Room air    Objective:     Has the patient been evaluated by SLP for swallowing?   Yes  Keep patient NPO? No   Current Respiratory Status:        Patient seen for ongoing dysphagia and speech language therapies.  Patient tolerated thin liquids via tsp and straw sips x4 with continued audible swallows and delayed throat clears. No overt signs of airway compromise with nectar thickened liquids.  Patient reporting nausea this session therefore solids not assessed.   Patient provided ~2-3 items in abstract categories indep, improved to 5 with semantic cues.  Skilled education was provided to patient and family members re: diet recs, nectar thickened liquids, MBSS standard aspiration precautions of which to follow, and ongoing ST plan of care.     Assessment:     Diego Gunter is a 81 y.o. male with an SLP diagnosis of Aphasia and Dysphagia.      Goals:   Multidisciplinary Problems     SLP Goals        Problem: SLP    Goal Priority Disciplines Outcome   SLP Goal    Low SLP Ongoing, Progressing   Description: Speech Language Pathology Goals  Goals expected to be met by 5/6:  1. Patient will tolerate a puree diet and nectar thickened liquids with no overt signs of airway  compromise.   2. Patient will answer simple yes/no questions with 50% acc indep.   3. Patient will complete auto speech tasks with 60% acc given min A.   4. Patient will follow simple commands with 60% acc given min A.   5. Patient will participate in further assessments as appropriate.                              Plan:     · Patient to be seen:  4 x/week   · Plan of Care expires:     · Plan of Care reviewed with:  spouse   · SLP Follow-Up:  Yes       Discharge recommendations:  rehabilitation facility   Barriers to Discharge:  None    Time Tracking:     SLP Treatment Date:   05/04/22  Speech Start Time:  0848  Speech Stop Time:  0900     Speech Total Time (min):  12 min    Billable Minutes: Speech Therapy Individual 6 and Treatment Swallowing Dysfunction 6    05/04/2022

## 2022-05-04 NOTE — PT/OT/SLP PROGRESS
Occupational Therapy   Co-Treatment w/ PT    Additional staff present: PT for co-tx due to patient's medical complexities requiring two skilled therapists in order to appropriately assess patient's functional deficits as well as ensure patient safety, accommodate for limited activity tolerance, and provide appropriate, skilled assistance to maximize functional potential during evaluation.    Patient Name:  Diego Gunter   MRN:  [id number]  Admit Date: 4/28/2022  Admitting Diagnosis:  Epidural hematoma   Length of Stay: 6 days  Recent Surgery: Procedure(s) (LRB):  LAMINECTOMY, SPINE, CERVICAL, POSTERIOR APPROACH C3-6 (Bilateral) 6 Days Post-Op    Procedure(s):  LAMINECTOMY, SPINE, CERVICAL, POSTERIOR APPROACH C3-6     Recommendations:     Discharge Recommendations: rehabilitation facility  Discharge Equipment Recommendations:   (TBD)  Barriers to discharge:   (increased assistance needed)    Plan:     Patient to be seen 4 x/week to address the above listed problems via self-care/home management, therapeutic activities, therapeutic exercises, neuromuscular re-education  · Plan of Care Expires: 05/29/22  · Plan of Care Reviewed with: patient, spouse    Assessment:   Diego Gunter is a 81 y.o. male with a medical diagnosis of Epidural hematoma.  He presents with the following performance deficits affecting function: weakness, impaired endurance, impaired self care skills, impaired functional mobilty, gait instability, impaired balance, decreased safety awareness, decreased lower extremity function, impaired coordination, impaired fine motor, decreased upper extremity function, decreased ROM, impaired joint extensibility, orthopedic precautions.  Pt would benefit from skilled OT services in order to maximize independence with ADLs and facilitate safe discharge. Pt's clinical condition meets full inpatient rehab criteria. Inpatient rehab will provide to total interdisciplinary treatment approach needed. Pt is at high  "risk of unplanned readmission due to fall risk.    Time spent performing bed mobility, EOB activity, self-care and ambulation in room    Relevant hx and current limitations:  · Affected side: right  · Speech: dysarthric  · Neglect/Inattention: R inattention -improved  · Command Following: ~100% simple command following ([facility])  · EOB sitting: Mod-CGA A (impaired midline)  ? Left posterolateral lean  · Diet: nectar liquids and regular diet  · Global ataxia -improved  · Ataxic gait    Pt continues to require significant assist with functional mobility and safe completion of ADLs requiring Max-Min A. Pt with noted improvement with self-care and ambulation as compared to last session. Patient is making progress towards goals.    Rehab Prognosis: Good; patient would benefit from acute skilled OT services to address these deficits and reach maximum level of function.        Subjective   Communicated with: SHARON Yadav prior to session.  Patient found HOB elevated with bed alarm, blood pressure cuff, pulse ox (continuous), telemetry, SCD, cervical collar upon OT entry to room.    Patient: "do you really think I am doing better"    Pain/Comfort:  Pain Rating 1: 0/10    Objective:   Patient found with: bed alarm, blood pressure cuff, pulse ox (continuous), telemetry, SCD, cervical collar   General Precautions: Standard, aspiration, fall   Orthopedic Precautions:spinal precautions   Braces: Cervical collar   Oxygen Device: Room Air  Vitals: BP (!) 97/54 (Patient Position: Lying)   Pulse 110   Temp 98.4 °F (36.9 °C) (Oral)   Resp 16   Ht 5' 9" (1.753 m)   Wt 74.8 kg (165 lb)   SpO2 (!) 94%   BMI 24.37 kg/m²     Outcome Measures:  AMPAC 6 Click ADL: 15    Occupational Performance:  Bed Mobility:       Rolling left: minimum assistance   Scooting: towards EOB with moderate assistance   Supine to Sit: moderate assistance   Sit to Supine: moderate assistance x2 persons    Functional Mobility/Transfers:     Sit to Stand: " minimum assistance using hand-held assist    Toilet Transfer: Maximal assistance x2 persons w/ B HHA   Pt ambulated 12' + 3' Max A x2 persons with B HHA to simulate household and/or community distances in order to maximize functional activity tolerance and dynamic standing balance required for engagement in occupations of choice.   o LOB: No, B knee instability, assist w/ weight shift and ataxic gait w/ VC for sequencing and foot placement (see PT note for details)  o SOB: No  o Dizziness: No    Activities of Daily Living:    Lower Body Dressing: moderate assistance to don/doff brief on BSC; pt able to don RLE and pull up w/ assistance over waist band  Toileting: moderate assistance to perform perineal hygiene and clothing management from BSC; pt able to perform partial posterior pericare    AMPAC 6 Click ADL:  AMPAC Total Score: 15    Therapeutic Exercise and/or Activity:  -AAROM performed RUE in all joints/planes of motion with stretches provided at end range, as indicated for recovery of effective participation in functional tasks, and to bolster proper circulation.  Sustained stretch provided for shoulder flexion and elbow extension at end range.   Assistance and facilitation provided for inferior angle of the scapula during shoulder flexion and abduction.     -RUE joint approximation provided to normalize tone.   o Spouse re-educated on hand placement and HEP    Treatment & Education:   Therapist provided facilitation and instruction of proper body mechanics, energy conservation, and fall prevention strategies during tasks listed above.   Pt re-educated on importance of OOB activities with staff member assistance and sitting OOB majority of the day.    Updated communication board with level of assist required (Mod A stand pivot) & educated RN/patient that pt is appropriate for transfers and mobility with RN/PCT.       Patient left HOB elevated with all lines intact, call button in reach, bed alarm on, RN  notified and RN and family present    GOALS:   Multidisciplinary Problems     Occupational Therapy Goals        Problem: Occupational Therapy    Goal Priority Disciplines Outcome Interventions   Occupational Therapy Goal     OT, PT/OT Ongoing, Progressing    Description: Goals to be met by: 5/28/2022     Patient will increase functional independence with ADLs by performing:    Feeding with Set-up Assistance.  UE Dressing with Minimal Assistance.  LE Dressing with Moderate Assistance.  Grooming while seated with Set-up Assistance.  Toileting from bedside commode with Minimal Assistance for hygiene and clothing management.   Sitting at edge of bed x15 minutes with Supervision.  Toilet transfer to bedside commode with Contact Guard Assistance.                     Time Tracking:     OT Date of Treatment: 05/04/22  OT Start Time: 1433  OT Stop Time: 1504  OT Total Time (min): 31 min    Additional staff present: PT    Billable Minutes:  Self Care/Home Management 11  Neuromuscular Re-education 20      Verna Jimenez), OTR/L  [phone] (Pager #)  5/4/2022

## 2022-05-04 NOTE — PHYSICIAN QUERY
PT Name: Diego Gunter  MR #: [id number]    DOCUMENTATION CLARIFICATION     CDS/: Fiorella Lazcano, RN, CDS               Contact information: [email or web address]  This form is a permanent document in the medical record.    Query Date: May 4, 2022      By submitting this query, we are merely seeking further clarification of documentation.  Please utilize your independent clinical judgment when addressing the question(s) below.    The Medical Record reflects the following:    Clinical Information Location in Medical Record     --Pt has been more delirious and agitated throughout the night. Precedex started.     -500ml NS bolus      -1000ml LR bolus     -precedex started    --s/p C3-C6 decompression of epidural hematoma and posterior fusion       -dex 4 q6h + H2B       -MAP goal >85, maintaining on levophed      - q1h neuro and VS checks  --Urinary retention         - noted in the OR         --DDAVP 0600 for high uop    -Pt dumping urine  -500ml LR bolus given  -Levo gtt titrated on and off to maintain MAP>80   -1/2 NS discontinued & LR started @125ml    --Hospital course has been complicated by shock with intermittent pressor requirements (off since 05/01)    --BP: 167/80->86/68->115/66->108/56-> Pheylephrine started 4/28: 139/67->112/89->Phenylephrine off 4/28: 139/69->132/66-> Norepinephrine started: 145/72-> Norepinephrine off: 157/67     RN POC Note 4/30 @ 422a            NCC Note 4/30              RN care plan Note 5/1 @ 433a            Hosp med c/s 5/4    VS flowsheet 4/28->5/1       Please clarify/confirm the Consultants diagnosis of __shock_____:     [  ] Diagnosis ruled in (please specify shock type): ____________     [ x ] Diagnosis ruled out   [  ] Other diagnosis (please specify): _____________________________   [  ] Clinically undetermined

## 2022-05-04 NOTE — PROGRESS NOTES
[facility] - Neurosurgery ([facility])  Neurosurgery  Progress Note    Subjective:     History of Present Illness: Patient is a 82 y/o male with antiphospholipid syndrome on chronic coumadin, MAC pneumonia, HLD, and left PE diagnosed January 2021 who was transferred from  for compressive epidural hematoma in the cervical spine. Patient reports that he woke up in the middle of the night and could no move the right side of his body. He went to  ED and stroke code initiated. Patient was not given tPA but given ASA. CTA was negative for acute CVA but did show compressive fluid collective from C3-C6. MRI cervical spine confirmed. Patient was transferred to [facility] ED for neurosurgical evaluation. He reports he cannot move his right arm out of the contracted position, he also cannot lift his right leg off the bed. He reports the right arm and leg are painful. No complaints on left side of body. Denies b/b dysfunction.       Post-Op Info:  Procedure(s) (LRB):  LAMINECTOMY, SPINE, CERVICAL, POSTERIOR APPROACH C3-6 (Bilateral)   6 Days Post-Op     Interval History: Patient with improvement in mental status. Family at bedside endorses improvement. Patient with BM s/p enema. Medicine following. BLE US, CXR, and EKG neg for acute processes.     Medications:  Continuous Infusions:  Scheduled Meds:   acetaminophen  1,000 mg Oral TID    albuterol-ipratropium  3 mL Nebulization Q6H    azithromycin  500 mg Oral Daily    DAPTOmycin (CUBICIN)  IV  8 mg/kg Intravenous Q24H    dexamethasone  4 mg Intravenous Q8H    ethambutoL  800 mg Oral Daily    famotidine  20 mg Oral BID    heparin (porcine)  5,000 Units Subcutaneous Q8H    LIDOcaine  1 patch Transdermal Q24H    polyethylene glycol  17 g Oral Daily    pravastatin  10 mg Oral Daily    QUEtiapine  25 mg Oral QHS    senna-docusate 8.6-50 mg  1 tablet Oral BID    silodosin  4 mg Oral Daily    sodium chloride 3%  4 mL Nebulization BID     PRN Meds:acetaminophen, dextrose 10%,  dextrose 10%, glucagon (human recombinant), insulin aspart U-100, melatonin, metoclopramide HCl, ondansetron, oxyCODONE     Review of Systems  Objective:     Weight: 74.8 kg (165 lb)  Body mass index is 24.37 kg/m².  Vital Signs (Most Recent):  Temp: 98.4 °F (36.9 °C) (05/04/22 1108)  Pulse: 69 (05/04/22 1108)  Resp: 16 (05/04/22 1108)  BP: (!) 97/54 (05/04/22 1108)  SpO2: (!) 94 % (05/04/22 1108)   Vital Signs (24h Range):  Temp:  [97.6 °F (36.4 °C)-98.5 °F (36.9 °C)] 98.4 °F (36.9 °C)  Pulse:  [60-70] 69  Resp:  [16-18] 16  SpO2:  [93 %-97 %] 94 %  BP: ()/(52-71) 97/54                     Neurosurgery Physical Exam  General: well developed, well nourished, no distress.   Head: normocephalic, atraumatic  Neurologic: Alert and oriented. Thought content somewhat appropriate. Some conversation hard to follow, patient with higher level confusion  GCS: Motor: 6/Verbal: 5/Eyes: 4 GCS Total: 15  Mental Status: Awake, Alert, Oriented x 3 (not to situation)  Language: No aphasia  Speech: No dysarthria  Cranial nerves: face symmetric, tongue midline, CN II-XII grossly intact.   Eyes: pupils equal, round, reactive to light with accommodation, EOMI.   Pulmonary: normal respirations, no signs of respiratory distress  Abdomen: soft,slightly distended  Skin: Skin is warm, dry and intact.  Sensory: intact to light touch throughout     Motor Strength:Moves all extremities spontaneously with good tone.  Full strength upper and lower extremities. No abnormal movements seen.      Strength   Deltoids Triceps Biceps Wrist Extension Wrist Flexion Hand    Upper: R 5/5 5/5 5/5 5/5 5/5 5/5     L 5/5 5/5 5/5 5/5 5/5 5/5       Iliopsoas Quadriceps Knee  Flexion Tibialis  anterior Gastro- cnemius EHL   Lower: R 5/5 5/5 5/5 5/5 5/5 5/5     L 5/5 5/5 5/5 5/5 5/5 5/5      Posterior cervical incision: Clean, dry, staples intact. Skin edges well approximated. No surrounding erythema or edema. No drainage or TTP.     Significant  Labs:  Recent Labs   Lab 05/03/22  0821 05/03/22  1543 05/04/22  0755   * 112* 119*   * 143 144   K 3.6 3.6 3.7    107 106   CO2 27 26 29   BUN 17 15 18   CREATININE 0.8 0.8 0.9   CALCIUM 8.9 9.1 8.3*   MG 2.0  --  2.2     Recent Labs   Lab 05/03/22  0821 05/04/22  0755   WBC 19.62* 17.81*   HGB 10.9* 10.7*   HCT 32.8* 35.6*    217     Recent Labs   Lab 05/03/22  0821 05/04/22  0755   INR 1.1 1.1   APTT 27.3 29.2     Microbiology Results (last 7 days)       Procedure Component Value Units Date/Time    Blood culture [[id number]] Collected: 05/03/22 1542    Order Status: Completed Specimen: Blood from Peripheral, Left Hand Updated: 05/04/22 0115     Blood Culture, Routine No Growth to date    Narrative:      Collection has been rescheduled by [facility] at 05/03/2022 12:38 Reason:   Patient unavailable going yo ct will yry back  Collection has been rescheduled by [facility] at 05/03/2022 12:41 Reason:   Spe with sabino stanton Patient unavailable going to ct  Collection has been rescheduled by [facility] at 05/03/2022 14:03 Reason:   Patient unavailable in ultrasound sabino valenzuela said come back in an hr   Collection has been rescheduled by [facility] at 05/03/2022 12:38 Reason:   Patient unavailable going yo ct will yry back  Collection has been rescheduled by [facility] at 05/03/2022 12:41 Reason:   Spe with sabino stanton Patient unavailable going to ct  Collection has been rescheduled by [facility] at 05/03/2022 14:03 Reason:   Patient unavailable in ultrasound sabino valenzuela said come back in an hr     Blood culture [[id number]] Collected: 05/03/22 1543    Order Status: Completed Specimen: Blood from Peripheral, Right Hand Updated: 05/04/22 0115     Blood Culture, Routine No Growth to date    Narrative:      Collection has been rescheduled by [facility] at 05/03/2022 12:38 Reason:   Patient unavailable going yo ct will yry back  Collection has been rescheduled by [facility] at 05/03/2022 12:41 Reason:   Spe with sabino stanton Patient unavailable going to ct  Collection  has been rescheduled by [facility] at 05/03/2022 14:03 Reason:   Patient unavailable in ultrasound rn nicolas said come back in an hr   Collection has been rescheduled by [facility] at 05/03/2022 12:38 Reason:   Patient unavailable going yo ct will yry back  Collection has been rescheduled by [facility] at 05/03/2022 12:41 Reason:   Spe with rn romy Patient unavailable going to ct  Collection has been rescheduled by [facility] at 05/03/2022 14:03 Reason:   Patient unavailable in ultrasound sabino valenzuela said come back in an hr     Blood culture [[id number]] Collected: 04/28/22 1201    Order Status: Completed Specimen: Blood from Peripheral, Forearm, Right Updated: 05/02/22 1303     Blood Culture, Routine No Growth after 4 days.     Blood culture [[id number]] Collected: 04/28/22 1201    Order Status: Completed Specimen: Blood from Peripheral, Hand, Left Updated: 05/02/22 1303     Blood Culture, Routine No Growth after 4 days.           All pertinent labs from the last 24 hours have been reviewed.    Significant Diagnostics:  I have reviewed all pertinent imaging results/findings within the past 24 hours.    Assessment/Plan:     * Epidural hematoma  82 y/o male with APS on coumadin, hx PE, MAC pneumonia, HLD with spontaneous C3-C6 epidural hematoma compressing the spinal cord    S/p  C3-C6 laminectomy and fusion for evacuation of epidural hematoma 4/28    Stepped down to NPU    -- CTH 4/29 no acute intracranial process   -- Post op MRI C spine satisfactory decompression with continued cord signal edema   --postop XR C spine hardware in good position  -- Acute encephalopathy:    --CTH 5/3 ordered per medicine team for some confusion: without evidence of acute hemorrhage.    --Hypernatremia: continue continuous D5 and follow repeat labs. Na 144 today, continue to monitor daily   --BLE US wo DVT, UA neg, CXR negative for acute processes  -- C-collar to be worn at all times   -- HV drains removed 4/30  -- MAP goals completed in ICU  -- History of  antiphospholipid syndrome and PE: hold warfarin. Heme/onc following.   -- SLP recommending regular diet with nectar thick liquids  -- Dex to 4q8, continue to taper   -- [facility] dc'd. Voiding per condom catheter. Continue to monitor for signs of retention  -- Constipation: Bowel movement s/p enema on 5/3  -- Activity: PT/OT recs for rehab  -- DVT prophylaxis: TEDs/SCDs/SQH    Dispo: Pending rehab, referrals sent.         Isabel Barrientos PA-C  Neurosurgery  [facility] - Neurosurgery ([facility])

## 2022-05-04 NOTE — ASSESSMENT & PLAN NOTE
80 y/o male with APS on coumadin, hx PE, MAC pneumonia, HLD with spontaneous C3-C6 epidural hematoma compressing the spinal cord    S/p  C3-C6 laminectomy and fusion for evacuation of epidural hematoma 4/28    Stepped down to NPU    -- CTH 4/29 no acute intracranial process   -- Post op MRI C spine satisfactory decompression with continued cord signal edema   --postop XR C spine hardware in good position  -- Acute encephalopathy:    --CTH 5/3 ordered per medicine team for some confusion: without evidence of acute hemorrhage.    --Hypernatremia: continue continuous D5 and follow repeat labs. Na 144 today, continue to monitor daily   --BLE US wo DVT, UA neg, CXR negative for acute processes  -- C-collar to be worn at all times   -- HV drains removed 4/30  -- MAP goals completed in ICU  -- History of antiphospholipid syndrome and PE: hold warfarin. Heme/onc following.   -- SLP recommending regular diet with nectar thick liquids  -- Dex to 4q8, continue to taper   -- [facility] dc'd. Voiding per condom catheter. Continue to monitor for signs of retention  -- Constipation: Bowel movement s/p enema on 5/3  -- Activity: PT/OT recs for rehab  -- DVT prophylaxis: TEDs/SCDs/SQH    Dispo: Pending rehab, referrals sent.

## 2022-05-04 NOTE — ASSESSMENT & PLAN NOTE
Mr. Gunter is a 81 year-old male with antiphospholipid syndrome (diagnosed 11/2021, on warfarin), PE, HLD, MAC (diagnosed in 2018, on chronic ethambutol and azithromycin), previous prostate ca (diagnosed in 2011, s/p prostatectomy) who presented on 04/28 with an epidural hematoma in C3-C6. He is s/p spinal decompression and fluid evacuation with NSGY on 04/28. Course has been complicated by shock with intermittent pressor requirements (off pressors since 05/01) and waxing/waning encephalopathy, for which medicine was consulted for.     Suspect encephalopathy is multifactorial.   Contributing factors include delirium vs medications (was on scheduled methocarbamol, pregabalin, and steroids) vs electrolyte imbalances (hypernatremic) vs infection (new leukocytosis).    As part of the workup, he has undergone video EEG monitoring from 05/01-05/02 without epileptiform changes. Additionally underwent a CTH 05/03 without acute changes.     Still suspect waxing/waning mentation and hallucinations are related to delirium. Now improving from yesterday.    --continue multimodal pain regimen and avoid sedatives as allowed  --follow infectious workup  --continue daily miralax and senna; agree with enema, will additionally add 1x dose of lactulose

## 2022-05-04 NOTE — PLAN OF CARE
Goals reviewed and revised as appropriate. Continue POC.    Problem: Physical Therapy  Goal: Physical Therapy Goal  Description: Goals to be met by: 2022     Patient will increase functional independence with mobility by performin. Supine to sit with minimum assistance  2. Sit to supine with minimum assistance - met 2022  Updated: supervision while maintaining spinal precautions   3. Sit to stand transfer with minimum assistance - met 2022  Updated: supervision   4. Bed to chair transfer with minimum assistance using LRAD as needed  5. Gait  x 25 feet with minimum assistance using LRAD as needed  6. Lower extremity exercise program x10 reps per handout, with independence  7. Recall 3/3 spinal precautions and how to don/doff c-collar with supervision   Outcome: Ongoing, Progressing

## 2022-05-04 NOTE — SUBJECTIVE & OBJECTIVE
Past Medical History:   Diagnosis Date    Antiphospholipid syndrome 11/19/2021    Dysphagia     Hx of colonic polyps     followed by GI. Dr. Pham    Hyperlipidemia LDL goal <100     Overweight(278.02)     Prostate cancer september 2011    followed by urology, Dr. Rogers    Type II or unspecified type diabetes mellitus without mention of complication, not stated as uncontrolled     diet controlled     Past Surgical History:   Procedure Laterality Date    BRONCHOSCOPY N/A 10/15/2018    Procedure: BRONCHOSCOPY;  Surgeon: Pratibha Diagnostic Provider;  Location: [facility] OR [address] [place] [address];  Service: Anesthesiology;  Laterality: N/A;    CATARACT EXTRACTION, BILATERAL  2014    POSTERIOR CERVICAL LAMINECTOMY Bilateral 4/28/2022    Procedure: LAMINECTOMY, SPINE, CERVICAL, POSTERIOR APPROACH C3-6;  Surgeon: Carlos Miller MD;  Location: [facility] OR [address] [place] [address];  Service: Neurosurgery;  Laterality: Bilateral;    PROSTATECTOMY       Review of patient's allergies indicates:  No Known Allergies    No current facility-administered medications on file prior to encounter.     Current Outpatient Medications on File Prior to Encounter   Medication Sig    albuterol (PROVENTIL/VENTOLIN HFA) 90 mcg/actuation inhaler INHALE 2 PUFFS BY MOUTH EVERY 6 HOURS AS NEEDED FOR WHEEZING    ascorbic acid, vitamin C, (VITAMIN C) 1000 MG tablet Take 1,000 mg by mouth once daily.    aspirin (ECOTRIN) 81 MG EC tablet Take 81 mg by mouth.    b complex vitamins capsule Take 1 capsule by mouth once daily.    ethambutoL (MYAMBUTOL) 400 MG Tab Take 2 tablets (800 mg total) by mouth once daily.    mucus clearing device (AEROBIKA OSCILLATING PEP SYSTM) by Misc.(Non-Drug; Combo Route) route 2 (two) times a day.    nebulizer and compressor (OMBRA COMPRESSOR SYSTEM) Marcy use to administer nebulized medication as directed    omega 3-dha-epa-fish oil 1,000 (120-180) mg Cap Take 1 capsule by mouth once daily.    omega-3 fatty acids 1,000 mg Cap Take 2 g by mouth.    omeprazole 20 mg  TbEC 1/2 tablet daily in am as needed.    pravastatin (PRAVACHOL) 10 MG tablet Take 1 tablet (10 mg total) by mouth once daily.    promethazine-dextromethorphan (PROMETHAZINE-DM) 6.25-15 mg/5 mL Syrp Take 10-15ml by mouth every 8 hours as needed for coughing    sodium chloride 3% 3 % nebulizer solution USE 4 ML VIAL IN NEBULIZER  TWICE DAILY    warfarin (COUMADIN) 3 MG tablet Take 1.5 tablets (4.5 mg total) by mouth Daily. As directed by coumadin clinic     Family History       Problem Relation (Age of Onset)    Colon cancer Mother    Dementia Brother    Diabetes Mother, Sister, , Brother, Brother, Maternal Aunt    Heart disease Father    Lung cancer Brother    Mental illness Sister    Myasthenia gravis Brother    Other Brother    Parkinsonism Brother    Pulmonary fibrosis Brother          Tobacco Use    Smoking status: Former Smoker     Packs/day: 0.25     Years: 5.00     Pack years: 1.25     Quit date: 10/2/1962     Years since quittin.6    Smokeless tobacco: Never Used    Tobacco comment: quit age 23   Substance and Sexual Activity    Alcohol use: Yes     Comment: beer occasionally    Drug use: No    Sexual activity: Yes     Partners: Female     Review of Systems   Constitutional:  Positive for fatigue. Negative for chills and fever.   HENT:  Negative for congestion and sore throat.    Respiratory:  Negative for cough and shortness of breath.    Cardiovascular:  Negative for chest pain, palpitations and leg swelling.   Gastrointestinal:  Positive for abdominal distention and constipation. Negative for abdominal pain, diarrhea, nausea and vomiting.   Genitourinary:  Negative for dysuria and frequency.   Musculoskeletal:  Negative for back pain and myalgias.   Skin:  Negative for color change and rash.   Neurological:  Negative for light-headedness and headaches.   Objective:     Vital Signs (Most Recent):  Temp: 98.4 °F (36.9 °C) (22 1108)  Pulse: 66 (22 1135)  Resp: 16 (22 1108)  BP: (!)  97/54 (05/04/22 1108)  SpO2: (!) 94 % (05/04/22 1108)   Vital Signs (24h Range):  Temp:  [97.6 °F (36.4 °C)-98.5 °F (36.9 °C)] 98.4 °F (36.9 °C)  Pulse:  [60-69] 66  Resp:  [16-18] 16  SpO2:  [93 %-97 %] 94 %  BP: ()/(52-71) 97/54     Weight: 74.8 kg (165 lb)  Body mass index is 24.37 kg/m².    Physical Exam  Constitutional:       General: He is not in acute distress.     Appearance: He is not ill-appearing or toxic-appearing.   HENT:      Head: Normocephalic and atraumatic.      Nose: No congestion or rhinorrhea.      Mouth/Throat:      Pharynx: No oropharyngeal exudate or posterior oropharyngeal erythema.   Eyes:      General: No scleral icterus.     Conjunctiva/sclera: Conjunctivae normal.   Cardiovascular:      Rate and Rhythm: Normal rate and regular rhythm.   Pulmonary:      Effort: Pulmonary effort is normal.      Breath sounds: Normal breath sounds.   Abdominal:      General: There is distension.      Tenderness: There is no abdominal tenderness. There is no guarding or rebound.   Musculoskeletal:         General: No swelling.      Right lower leg: No edema.      Left lower leg: No edema.   Skin:     General: Skin is warm.      Coloration: Skin is not jaundiced.   Neurological:      General: No focal deficit present.      Mental Status: He is alert.      Comments: Oriented to person, intermittently to time and place  Follows commands appropriately  Strength 4/5 throughout  Sensation intact throughout     Significant Labs: All pertinent labs within the past 24 hours have been reviewed.    Significant Imaging: I have reviewed all pertinent imaging results/findings within the past 24 hours.

## 2022-05-04 NOTE — PT/OT/SLP PROGRESS
"Physical Therapy Co-Treatment    Patient Name:  Diego Gunter   MRN:  [id number]    Recommendations:     Discharge Recommendations:  rehabilitation facility   Discharge Equipment Recommendations:  (TBD)   Barriers to discharge: Increased level of assist    Assessment:     Diego Gunter is a 81 y.o. male admitted with a medical diagnosis of Epidural hematoma. Patient demonstrates improved gait stability as demonstrated by increased gait distance.     He presents with the following impairments/functional limitations: weakness, impaired endurance, impaired self care skills, impaired functional mobilty, impaired balance, gait instability, decreased safety awareness, decreased lower extremity function, decreased upper extremity function, impaired coordination, decreased ROM, impaired fine motor, impaired joint extensibility, impaired sensation, orthopedic precautions. These deficits affect their roles and responsibilities in which they were able to complete prior to admit. Diego Gunter would continue to benefit from acute PT intervention to improve quality of life and focus on recovery of impairments. Once medically stable, recommending pt discharge to Inpatient Rehabilitation Facility.    Rehab Prognosis: Good; patient continues to benefit from acute skilled PT services to address these deficits and reach maximum level of function.  Patient remains most appropriate to discharge to rehabilitation facility.  Recent Surgery: Procedure(s) (LRB):  LAMINECTOMY, SPINE, CERVICAL, POSTERIOR APPROACH C3-6 (Bilateral) 6 Days Post-Op    Plan:     During this hospitalization, patient to be seen 4 x/week to address the identified rehab impairments via gait training, therapeutic activities, therapeutic exercises, neuromuscular re-education and progress toward the following goals:    · Plan of Care Expires:  05/30/22    Subjective     Chief Complaint: None verbalized   Patient/Family Comments/Goals: "I want to go " "home"  Pain/Comfort:  · Pain Rating 1: 0/10    Objective:     Communicated with RN prior to session. Patient found HOB elevated with bed alarm, blood pressure cuff, pulse ox (continuous), telemetry, SCD, cervical collar upon PT entry to room.     General Precautions: Standard, aspiration, fall   Orthopedic Precautions:spinal precautions   Braces: Cervical collar    Functional Mobility:  · Bed Mobility:     · Rolling Left:  minimum assistance  · Supine to Sit: moderate assistance for log rolling technique  · Sit to Supine: moderate assistance of 2 persons for log rolling technique  · Transfers:     · Sit to Stand: minimum assistance with hand-held assist  · Toilet transfer: maximal assistance of 2 persons with hand-held assist using Step Transfer  · Gait: Patient ambulated 12 ft with hand-held assist and maximal assistance of 2 persons. Patient demonstrates unsteady gait, decreased step length, narrow base of support, decreased weight shift, decreased foot clearance, decreased suraj, impaired sequencing, B knee buckling, and ataxic gait. Patient required cues for upright posture, sequencing, weight shift, R knee stability. All lines remained intact throughout ambulation trial, gait belt utilized.  · Balance:   · Static Sitting: Good, able to maintain for 3 minute(s) with stand by - contact guard assistance  · Dynamic Sitting: Fair: Patient accepts minimal challenge, stand by - minimum assistance  · Static Standing: Poor, able to maintain for 10 seconds with moderate assistance of 2 persons  · Dynamic Standing: Poor: Patient unable to accept challenge or move without loss of balance, maximal assistance of 2 persons    AM-PAC 6 CLICK MOBILITY  Turning over in bed (including adjusting bedclothes, sheets and blankets)?: 3  Sitting down on and standing up from a chair with arms (e.g., wheelchair, bedside commode, etc.): 3  Moving from lying on back to sitting on the side of the bed?: 2  Moving to and from a bed to a " chair (including a wheelchair)?: 2  Need to walk in hospital room?: 2  Climbing 3-5 steps with a railing?: 1  Basic Mobility Total Score: 13     Therapeutic Activities and Exercises:  Patient educated on role of acute care PT and PT POC  Educated on spinal precautions including avoidance of bending, lifting, and twisting. Patient expresses understanding  Educated on log roll technique, performed to left  Whiteboard updated, Patient is clear to stand pivot transfer with RN/PCT, assist x2    Patient left HOB elevated with all lines intact, call button in reach, RN notified, bed alarm on and spouse present.    GOALS:   Multidisciplinary Problems     Physical Therapy Goals        Problem: Physical Therapy    Goal Priority Disciplines Outcome Goal Variances Interventions   Physical Therapy Goal     PT, PT/OT Ongoing, Progressing     Description: Goals to be met by: 2022     Patient will increase functional independence with mobility by performin. Supine to sit with minimum assistance  2. Sit to supine with minimum assistance - met 2022  Updated: supervision while maintaining spinal precautions   3. Sit to stand transfer with minimum assistance - met 2022  Updated: supervision   4. Bed to chair transfer with minimum assistance using LRAD as needed  5. Gait  x 25 feet with minimum assistance using LRAD as needed  6. Lower extremity exercise program x10 reps per handout, with independence  7. Recall 3/3 spinal precautions and how to don/doff c-collar with supervision                    Time Tracking:     PT Received On: 22  PT Start Time: 1433     PT Stop Time: 1504  PT Total Time (min): 31 min     Billable Minutes: Gait Training 20 min and Therapeutic Activity 11 min        PT/PTA: PT     PTA Visit Number: 0     2022    Co-treatment performed for this visit due to patient need for two skilled therapists to ensure patient and staff safety and to accommodate for patient activity tolerance/pain  management

## 2022-05-04 NOTE — PROGRESS NOTES
[facility] ([facility])  Physical Medicine & Rehab  Progress Note    Patient Name: Diego Gunter  MRN: [id number]  Admission Date: 4/28/2022  Length of Stay: 6 days  Attending Physician: Carlos Miller MD    Subjective:     Principal Problem:Epidural hematoma    Hospital Course:   5/2/22: Ambulated 6 steps modA x 2 ppl.        Interval History 5/4/2022:  Patient is seen for follow-up PM&R evaluation and recommendations: Camera sitter at bs. Confusion still present. Alert. Participating with therapy.     HPI, Past Medical, Family, and Social History remains the same as documented in the initial encounter.    Scheduled Medications:    acetaminophen  1,000 mg Oral TID    albuterol-ipratropium  3 mL Nebulization Q6H    azithromycin  500 mg Oral Daily    DAPTOmycin (CUBICIN)  IV  8 mg/kg Intravenous Q24H    dexamethasone  4 mg Intravenous Q8H    ethambutoL  800 mg Oral Daily    famotidine  20 mg Oral BID    heparin (porcine)  5,000 Units Subcutaneous Q8H    lactulose  15 g Oral Once    LIDOcaine  1 patch Transdermal Q24H    polyethylene glycol  17 g Oral Daily    pravastatin  10 mg Oral Daily    QUEtiapine  25 mg Oral QHS    senna-docusate 8.6-50 mg  1 tablet Oral BID    silodosin  4 mg Oral Daily    sodium chloride 3%  4 mL Nebulization BID       Diagnostic Results: Labs: Reviewed  ECG: Reviewed  CT: Reviewed    PRN Medications: acetaminophen, dextrose 10%, dextrose 10%, glucagon (human recombinant), insulin aspart U-100, melatonin, metoclopramide HCl, ondansetron, oxyCODONE    Review of Systems   Constitutional:  Positive for activity change.   Musculoskeletal:  Positive for gait problem.   Neurological:  Positive for weakness.   Psychiatric/Behavioral:  Positive for behavioral problems and confusion.    Objective:     Vital Signs (Most Recent):  Temp: 97.6 °F (36.4 °C) (05/04/22 0721)  Pulse: 67 (05/04/22 0739)  Resp: 18 (05/04/22 0739)  BP: (!) 94/52 (05/04/22 0721)  SpO2: 97 % (05/04/22  0739)      Vital Signs (24h Range):  Temp:  [97.6 °F (36.4 °C)-98.5 °F (36.9 °C)] 97.6 °F (36.4 °C)  Pulse:  [60-70] 67  Resp:  [16-18] 18  SpO2:  [93 %-97 %] 97 %  BP: ()/(52-71) 94/52     Physical Exam  Vitals and nursing note reviewed.   Constitutional:       Appearance: He is well-developed.   HENT:      Head: Normocephalic and atraumatic.   Eyes:      General:         Right eye: No discharge.         Left eye: No discharge.      Pupils: Pupils are equal, round, and reactive to light.   Pulmonary:      Effort: Pulmonary effort is normal. No respiratory distress.   Abdominal:      General: There is no distension.      Palpations: Abdomen is soft.      Tenderness: There is no abdominal tenderness.   Musculoskeletal:         General: No deformity.      Cervical back: Neck supple.      Comments: BLE weakness present   Skin:     General: Skin is warm and dry.   Neurological:      Mental Status: He is alert.      Sensory: No sensory deficit.      Motor: Weakness present. No abnormal muscle tone.      Comments: - confusion present   Psychiatric:         Behavior: Behavior normal.      Comments: - BUE restraints   - camera sitter       Assessment/Plan:      * Epidural hematoma  - s/p C3-C6 decompression of epidural hematoma and posterior fusion on 4/28     Impaired mobility  - Related to prolonged/acute hospital course.     Recommendations  -  Encourage mobility, OOB in chair at least 3 hours per day, and early ambulation as appropriate  -  PT/OT evaluate and treat  -  Pain management  -  Monitor for and prevent skin breakdown and pressure ulcers  · Early mobility, repositioning/weight shifting every 20-30 minutes when sitting, turn patient every 2 hours, proper mattress/overlay and chair cushioning, pressure relief/heel protector boots  -  DVT prophylaxis    -  Reviewed discharge options (IP rehab, SNF, HH therapy, and OP therapy)    PM&R Recommendation:     At this time, the PM&R team has reviewed this patient's  ongoing medical case including inpatient diagnosis, medical history, clinical examination, labs, vitals, current social and functional history.    We will continue to follow as a rehab candidate and improvement in encephalopathy with removal of camera sitter.     Marilee Kowalski NP  Department of Physical Medicine & Rehab   [facility] - Neurosurgery ([facility])

## 2022-05-04 NOTE — PLAN OF CARE
Problem: Adult Inpatient Plan of Care  Goal: Plan of Care Review  Outcome: Ongoing, Progressing  Flowsheets (Taken 5/4/2022 1350)  Plan of Care Reviewed With:   patient   spouse  Goal: Optimal Comfort and Wellbeing  Outcome: Ongoing, Progressing  Intervention: Provide Person-Centered Care  Flowsheets (Taken 5/4/2022 1350)  Trust Relationship/Rapport:   care explained   choices provided   questions encouraged   reassurance provided   questions answered   POC reviewed with the patient and they verbalized understanding. Pt can usually answer orientation questions with frequent reorientation upon waking, but is very forgetful.  Restraints discontinued due to wife/sitter at bedside to redirect pt when pulling at lines and C-collar. Pt is having some asymmetric abdominal distention that is tender to palpation. Team was notified and assessed pt. Lactulose ordered and given for suspected constipation. Team notified of low BP with MAP of 66 for which pt received 2000 ml LR bolus. Pt is awaiting an abdominal CT. All comments and concerns addressed. Bed locked in lowest position with bed alarm set, call light within reach. Safety precautions maintained. Will continue to monitor for changes to POC and clinical condition.

## 2022-05-04 NOTE — SUBJECTIVE & OBJECTIVE
Interval History: Patient with improvement in mental status. Family at bedside endorses improvement. Patient with BM s/p enema. Medicine following. BLE US, CXR, and EKG neg for acute processes.     Medications:  Continuous Infusions:  Scheduled Meds:   acetaminophen  1,000 mg Oral TID    albuterol-ipratropium  3 mL Nebulization Q6H    azithromycin  500 mg Oral Daily    DAPTOmycin (CUBICIN)  IV  8 mg/kg Intravenous Q24H    dexamethasone  4 mg Intravenous Q8H    ethambutoL  800 mg Oral Daily    famotidine  20 mg Oral BID    heparin (porcine)  5,000 Units Subcutaneous Q8H    LIDOcaine  1 patch Transdermal Q24H    polyethylene glycol  17 g Oral Daily    pravastatin  10 mg Oral Daily    QUEtiapine  25 mg Oral QHS    senna-docusate 8.6-50 mg  1 tablet Oral BID    silodosin  4 mg Oral Daily    sodium chloride 3%  4 mL Nebulization BID     PRN Meds:acetaminophen, dextrose 10%, dextrose 10%, glucagon (human recombinant), insulin aspart U-100, melatonin, metoclopramide HCl, ondansetron, oxyCODONE     Review of Systems  Objective:     Weight: 74.8 kg (165 lb)  Body mass index is 24.37 kg/m².  Vital Signs (Most Recent):  Temp: 98.4 °F (36.9 °C) (05/04/22 1108)  Pulse: 69 (05/04/22 1108)  Resp: 16 (05/04/22 1108)  BP: (!) 97/54 (05/04/22 1108)  SpO2: (!) 94 % (05/04/22 1108)   Vital Signs (24h Range):  Temp:  [97.6 °F (36.4 °C)-98.5 °F (36.9 °C)] 98.4 °F (36.9 °C)  Pulse:  [60-70] 69  Resp:  [16-18] 16  SpO2:  [93 %-97 %] 94 %  BP: ()/(52-71) 97/54                     Neurosurgery Physical Exam  General: well developed, well nourished, no distress.   Head: normocephalic, atraumatic  Neurologic: Alert and oriented. Thought content somewhat appropriate. Some conversation hard to follow, patient with higher level confusion  GCS: Motor: 6/Verbal: 5/Eyes: 4 GCS Total: 15  Mental Status: Awake, Alert, Oriented x 3 (not to situation)  Language: No aphasia  Speech: No dysarthria  Cranial nerves: face symmetric, tongue midline, CN  II-XII grossly intact.   Eyes: pupils equal, round, reactive to light with accommodation, EOMI.   Pulmonary: normal respirations, no signs of respiratory distress  Abdomen: soft,slightly distended  Skin: Skin is warm, dry and intact.  Sensory: intact to light touch throughout     Motor Strength:Moves all extremities spontaneously with good tone.  Full strength upper and lower extremities. No abnormal movements seen.      Strength   Deltoids Triceps Biceps Wrist Extension Wrist Flexion Hand    Upper: R 5/5 5/5 5/5 5/5 5/5 5/5     L 5/5 5/5 5/5 5/5 5/5 5/5       Iliopsoas Quadriceps Knee  Flexion Tibialis  anterior Gastro- cnemius EHL   Lower: R 5/5 5/5 5/5 5/5 5/5 5/5     L 5/5 5/5 5/5 5/5 5/5 5/5      Posterior cervical incision: Clean, dry, staples intact. Skin edges well approximated. No surrounding erythema or edema. No drainage or TTP.     Significant Labs:  Recent Labs   Lab 05/03/22  0821 05/03/22  1543 05/04/22  0755   * 112* 119*   * 143 144   K 3.6 3.6 3.7    107 106   CO2 27 26 29   BUN 17 15 18   CREATININE 0.8 0.8 0.9   CALCIUM 8.9 9.1 8.3*   MG 2.0  --  2.2     Recent Labs   Lab 05/03/22  0821 05/04/22  0755   WBC 19.62* 17.81*   HGB 10.9* 10.7*   HCT 32.8* 35.6*    217     Recent Labs   Lab 05/03/22  0821 05/04/22  0755   INR 1.1 1.1   APTT 27.3 29.2     Microbiology Results (last 7 days)       Procedure Component Value Units Date/Time    Blood culture [[id number]] Collected: 05/03/22 1542    Order Status: Completed Specimen: Blood from Peripheral, Left Hand Updated: 05/04/22 0115     Blood Culture, Routine No Growth to date    Narrative:      Collection has been rescheduled by [facility] at 05/03/2022 12:38 Reason:   Patient unavailable going yo ct will yry back  Collection has been rescheduled by 6 at 05/03/2022 12:41 Reason:   Spe with sabino stanton Patient unavailable going to ct  Collection has been rescheduled by [facility] at 05/03/2022 14:03 Reason:   Patient unavailable in  ultrasound sabino valenzuela said come back in an hr   Collection has been rescheduled by [facility] at 05/03/2022 12:38 Reason:   Patient unavailable going yo ct will yry back  Collection has been rescheduled by [facility] at 05/03/2022 12:41 Reason:   Spe with sabino stanton Patient unavailable going to ct  Collection has been rescheduled by 6 at 05/03/2022 14:03 Reason:   Patient unavailable in ultrasound sabino valenzuela said come back in an hr     Blood culture [[id number]] Collected: 05/03/22 1543    Order Status: Completed Specimen: Blood from Peripheral, Right Hand Updated: 05/04/22 0115     Blood Culture, Routine No Growth to date    Narrative:      Collection has been rescheduled by 6 at 05/03/2022 12:38 Reason:   Patient unavailable going yo ct will yry back  Collection has been rescheduled by 6 at 05/03/2022 12:41 Reason:   Spe with sabino stanton Patient unavailable going to ct  Collection has been rescheduled by [facility] at 05/03/2022 14:03 Reason:   Patient unavailable in ultrasound sabino valenzuela said come back in an hr   Collection has been rescheduled by [facility] at 05/03/2022 12:38 Reason:   Patient unavailable going yo ct will yry back  Collection has been rescheduled by 6 at 05/03/2022 12:41 Reason:   Spe with sabino stanton Patient unavailable going to ct  Collection has been rescheduled by [facility] at 05/03/2022 14:03 Reason:   Patient unavailable in ultrasound sabino valenzuela said come back in an hr     Blood culture [[id number]] Collected: 04/28/22 1201    Order Status: Completed Specimen: Blood from Peripheral, Forearm, Right Updated: 05/02/22 1303     Blood Culture, Routine No Growth after 4 days.     Blood culture [[id number]] Collected: 04/28/22 1201    Order Status: Completed Specimen: Blood from Peripheral, Hand, Left Updated: 05/02/22 1303     Blood Culture, Routine No Growth after 4 days.           All pertinent labs from the last 24 hours have been reviewed.    Significant Diagnostics:  I have reviewed all pertinent imaging results/findings within the  past 24 hours.

## 2022-05-04 NOTE — PROGRESS NOTES
"[facility] - Neurosurgery ([facility] Medicine  Progress Note    Patient Name: Diego Gunter  MRN: [id number]  Patient Class: IP- Inpatient   Admission Date: 4/28/2022  Length of Stay: 6 days  Attending Physician: Carlos Miller MD  Primary Care Provider: Portia Ferreira MD        Subjective:     Principal Problem:Epidural hematoma        HPI:  Mr. Gunter is a 81 year-old male with antiphospholipid syndrome (diagnosed 11/2021, on warfarin), PE, HLD, MAC (diagnosed in 2018, on chronic ethambutol and azithromycin), previous prostate ca (diagnosed in 2011, s/p prostatectomy) who presented to the hospital on 04/28 with R-sided weakness. Stroke workup was negative, but imaging was consistent with a compressive epidural hematoma ni the cervical spine C3-C6. He underwent spinal decompression and fluid evacuation with NSGY on 04/28. Since admitted, the Hematology/Oncology service was consulted- recommended not resuming therapeutic anticoagulation for APLS given current bleeding event and starting ppx anticoagulation when deemed safe by nsgy. ID was also consulted for recommendations regarding whether the epidural fluid collection could be an infectious collection- recommended starting dapto until path returned. Hospital course has been complicated by shock with intermittent pressor requirements (off since 05/01) and waxing/waning encephalopathy. He was noted to have acute changes in mental status around 05/01 which prompted a workup- EEG was obtained without epileptiform changes but with evidence of diffuse background slowing. Medicine consulted for "medical management; APS previously on coumadin; post op delirium".      Overview/Hospital Course:  No notes on file    Past Medical History:   Diagnosis Date    Antiphospholipid syndrome 11/19/2021    Dysphagia     Hx of colonic polyps     followed by GI. Dr. Pham    Hyperlipidemia LDL goal <100     Overweight(278.02)     Prostate cancer september 2011    " followed by urology, Dr. Rogers    Type II or unspecified type diabetes mellitus without mention of complication, not stated as uncontrolled     diet controlled     Past Surgical History:   Procedure Laterality Date    BRONCHOSCOPY N/A 10/15/2018    Procedure: BRONCHOSCOPY;  Surgeon: Pratibha Diagnostic Provider;  Location: [facility] OR [address] [place] [address];  Service: Anesthesiology;  Laterality: N/A;    CATARACT EXTRACTION, BILATERAL  2014    POSTERIOR CERVICAL LAMINECTOMY Bilateral 4/28/2022    Procedure: LAMINECTOMY, SPINE, CERVICAL, POSTERIOR APPROACH C3-6;  Surgeon: Carlos Miller MD;  Location: [facility] OR [address] [place] [address];  Service: Neurosurgery;  Laterality: Bilateral;    PROSTATECTOMY       Review of patient's allergies indicates:  No Known Allergies    No current facility-administered medications on file prior to encounter.     Current Outpatient Medications on File Prior to Encounter   Medication Sig    albuterol (PROVENTIL/VENTOLIN HFA) 90 mcg/actuation inhaler INHALE 2 PUFFS BY MOUTH EVERY 6 HOURS AS NEEDED FOR WHEEZING    ascorbic acid, vitamin C, (VITAMIN C) 1000 MG tablet Take 1,000 mg by mouth once daily.    aspirin (ECOTRIN) 81 MG EC tablet Take 81 mg by mouth.    b complex vitamins capsule Take 1 capsule by mouth once daily.    ethambutoL (MYAMBUTOL) 400 MG Tab Take 2 tablets (800 mg total) by mouth once daily.    mucus clearing device (AEROBIKA OSCILLATING PEP SYSTM) by Misc.(Non-Drug; Combo Route) route 2 (two) times a day.    nebulizer and compressor (OMBRA COMPRESSOR SYSTEM) Marcy use to administer nebulized medication as directed    omega 3-dha-epa-fish oil 1,000 (120-180) mg Cap Take 1 capsule by mouth once daily.    omega-3 fatty acids 1,000 mg Cap Take 2 g by mouth.    omeprazole 20 mg TbEC 1/2 tablet daily in am as needed.    pravastatin (PRAVACHOL) 10 MG tablet Take 1 tablet (10 mg total) by mouth once daily.    promethazine-dextromethorphan (PROMETHAZINE-DM) 6.25-15 mg/5 mL Syrp Take 10-15ml by mouth every  8 hours as needed for coughing    sodium chloride 3% 3 % nebulizer solution USE 4 ML VIAL IN NEBULIZER  TWICE DAILY    warfarin (COUMADIN) 3 MG tablet Take 1.5 tablets (4.5 mg total) by mouth Daily. As directed by coumadin clinic     Family History       Problem Relation (Age of Onset)    Colon cancer Mother    Dementia Brother    Diabetes Mother, Sister, , Brother, Brother, Maternal Aunt    Heart disease Father    Lung cancer Brother    Mental illness Sister    Myasthenia gravis Brother    Other Brother    Parkinsonism Brother    Pulmonary fibrosis Brother          Tobacco Use    Smoking status: Former Smoker     Packs/day: 0.25     Years: 5.00     Pack years: 1.25     Quit date: 10/2/1962     Years since quittin.6    Smokeless tobacco: Never Used    Tobacco comment: quit age 23   Substance and Sexual Activity    Alcohol use: Yes     Comment: beer occasionally    Drug use: No    Sexual activity: Yes     Partners: Female     Review of Systems   Constitutional:  Positive for fatigue. Negative for chills and fever.   HENT:  Negative for congestion and sore throat.    Respiratory:  Negative for cough and shortness of breath.    Cardiovascular:  Negative for chest pain, palpitations and leg swelling.   Gastrointestinal:  Positive for abdominal distention and constipation. Negative for abdominal pain, diarrhea, nausea and vomiting.   Genitourinary:  Negative for dysuria and frequency.   Musculoskeletal:  Negative for back pain and myalgias.   Skin:  Negative for color change and rash.   Neurological:  Negative for light-headedness and headaches.   Objective:     Vital Signs (Most Recent):  Temp: 98.4 °F (36.9 °C) (22 1108)  Pulse: 66 (22 1135)  Resp: 16 (22 1108)  BP: (!) 97/54 (22 1108)  SpO2: (!) 94 % (22 1108)   Vital Signs (24h Range):  Temp:  [97.6 °F (36.4 °C)-98.5 °F (36.9 °C)] 98.4 °F (36.9 °C)  Pulse:  [60-69] 66  Resp:  [16-18] 16  SpO2:  [93 %-97 %] 94 %  BP:  ()/(52-71) 97/54     Weight: 74.8 kg (165 lb)  Body mass index is 24.37 kg/m².    Physical Exam  Constitutional:       General: He is not in acute distress.     Appearance: He is not ill-appearing or toxic-appearing.   HENT:      Head: Normocephalic and atraumatic.      Nose: No congestion or rhinorrhea.      Mouth/Throat:      Pharynx: No oropharyngeal exudate or posterior oropharyngeal erythema.   Eyes:      General: No scleral icterus.     Conjunctiva/sclera: Conjunctivae normal.   Cardiovascular:      Rate and Rhythm: Normal rate and regular rhythm.   Pulmonary:      Effort: Pulmonary effort is normal.      Breath sounds: Normal breath sounds.   Abdominal:      General: There is distension.      Tenderness: There is no abdominal tenderness. There is no guarding or rebound.   Musculoskeletal:         General: No swelling.      Right lower leg: No edema.      Left lower leg: No edema.   Skin:     General: Skin is warm.      Coloration: Skin is not jaundiced.   Neurological:      General: No focal deficit present.      Mental Status: He is alert.      Comments: Oriented to person, intermittently to time and place  Follows commands appropriately  Strength 4/5 throughout  Sensation intact throughout     Significant Labs: All pertinent labs within the past 24 hours have been reviewed.    Significant Imaging: I have reviewed all pertinent imaging results/findings within the past 24 hours.      Assessment/Plan:      * Epidural hematoma  --continue management per primary    Acute encephalopathy  Mr. Gunter is a 81 year-old male with antiphospholipid syndrome (diagnosed 11/2021, on warfarin), PE, HLD, MAC (diagnosed in 2018, on chronic ethambutol and azithromycin), previous prostate ca (diagnosed in 2011, s/p prostatectomy) who presented on 04/28 with an epidural hematoma in C3-C6. He is s/p spinal decompression and fluid evacuation with NSGY on 04/28. Course has been complicated by shock with intermittent pressor  requirements (off pressors since 05/01) and waxing/waning encephalopathy, for which medicine was consulted for.     Suspect encephalopathy is multifactorial.   Contributing factors include delirium vs medications (was on scheduled methocarbamol, pregabalin, and steroids) vs electrolyte imbalances (hypernatremic) vs infection (new leukocytosis).    As part of the workup, he has undergone video EEG monitoring from 05/01-05/02 without epileptiform changes. Additionally underwent a CTH 05/03 without acute changes.     Still suspect waxing/waning mentation and hallucinations are related to delirium. Now improving from yesterday.    --continue multimodal pain regimen and avoid sedatives as allowed  --follow infectious workup  --continue daily miralax and senna; agree with enema, will additionally add 1x dose of lactulose    History of pulmonary embolism  --hold therapeutic anticoagulation at this time, per Heme/Onc recs, given recent bleed    HILLARY (mycobacterium avium-intracellulare)  --continue ethambutol and azithromycin    Hyperlipidemia associated with type 2 diabetes mellitus  --continue statin    VTE Risk Mitigation (From admission, onward)         Ordered     heparin (porcine) injection 5,000 Units  Every 8 hours         04/29/22 1704     Reason for No Pharmacological VTE Prophylaxis  Once        Question:  Reasons:  Answer:  Risk of Bleeding    04/28/22 2058     IP VTE HIGH RISK PATIENT  Once         04/28/22 2058     Place sequential compression device  Until discontinued         04/28/22 2058              Discharge Planning   MALIA: 5/6/2022     Code Status: Full Code   Is the patient medically ready for discharge?: No    Reason for patient still in hospital (select all that apply): Patient trending condition  Discharge Plan A: Rehab        Twyla Preciado MD  Department of Hospital Medicine   [facility] - Neurosurgery ([facility])

## 2022-05-05 PROBLEM — E87.0 HYPERNATREMIA: Status: ACTIVE | Noted: 2022-05-05

## 2022-05-05 LAB
ALBUMIN SERPL BCP-MCNC: 3.4 G/DL (ref 3.5–5.2)
ALP SERPL-CCNC: 61 U/L (ref 55–135)
ALT SERPL W/O P-5'-P-CCNC: 45 U/L (ref 10–44)
ANION GAP SERPL CALC-SCNC: 8 MMOL/L (ref 8–16)
APTT BLDCRRT: 30.3 SEC (ref 21–32)
AST SERPL-CCNC: 35 U/L (ref 10–40)
BASOPHILS # BLD AUTO: 0.04 K/UL (ref 0–0.2)
BASOPHILS NFR BLD: 0.3 % (ref 0–1.9)
BILIRUB SERPL-MCNC: 0.9 MG/DL (ref 0.1–1)
BUN SERPL-MCNC: 16 MG/DL (ref 8–23)
CALCIUM SERPL-MCNC: 9.3 MG/DL (ref 8.7–10.5)
CHLORIDE SERPL-SCNC: 113 MMOL/L (ref 95–110)
CO2 SERPL-SCNC: 27 MMOL/L (ref 23–29)
CREAT SERPL-MCNC: 0.9 MG/DL (ref 0.5–1.4)
DIFFERENTIAL METHOD: ABNORMAL
EOSINOPHIL # BLD AUTO: 0 K/UL (ref 0–0.5)
EOSINOPHIL NFR BLD: 0.1 % (ref 0–8)
ERYTHROCYTE [DISTWIDTH] IN BLOOD BY AUTOMATED COUNT: 16.5 % (ref 11.5–14.5)
EST. GFR  (AFRICAN AMERICAN): >60 ML/MIN/1.73 M^2
EST. GFR  (NON AFRICAN AMERICAN): >60 ML/MIN/1.73 M^2
FOLATE SERPL-MCNC: 14.6 NG/ML (ref 4–24)
GLUCOSE SERPL-MCNC: 147 MG/DL (ref 70–110)
HCT VFR BLD AUTO: 37.6 % (ref 40–54)
HGB BLD-MCNC: 11.9 G/DL (ref 14–18)
IMM GRANULOCYTES # BLD AUTO: 0.29 K/UL (ref 0–0.04)
IMM GRANULOCYTES NFR BLD AUTO: 2 % (ref 0–0.5)
INR PPP: 1.1 (ref 0.8–1.2)
LYMPHOCYTES # BLD AUTO: 1.9 K/UL (ref 1–4.8)
LYMPHOCYTES NFR BLD: 13 % (ref 18–48)
MAGNESIUM SERPL-MCNC: 2.2 MG/DL (ref 1.6–2.6)
MCH RBC QN AUTO: 27.6 PG (ref 27–31)
MCHC RBC AUTO-ENTMCNC: 31.6 G/DL (ref 32–36)
MCV RBC AUTO: 87 FL (ref 82–98)
MONOCYTES # BLD AUTO: 1.2 K/UL (ref 0.3–1)
MONOCYTES NFR BLD: 8.7 % (ref 4–15)
NEUTROPHILS # BLD AUTO: 10.8 K/UL (ref 1.8–7.7)
NEUTROPHILS NFR BLD: 75.9 % (ref 38–73)
NRBC BLD-RTO: 0 /100 WBC
PHOSPHATE SERPL-MCNC: 3.4 MG/DL (ref 2.7–4.5)
PLATELET # BLD AUTO: 194 K/UL (ref 150–450)
PMV BLD AUTO: 11.8 FL (ref 9.2–12.9)
POCT GLUCOSE: 115 MG/DL (ref 70–110)
POCT GLUCOSE: 122 MG/DL (ref 70–110)
POCT GLUCOSE: 148 MG/DL (ref 70–110)
POCT GLUCOSE: 164 MG/DL (ref 70–110)
POTASSIUM SERPL-SCNC: 3.6 MMOL/L (ref 3.5–5.1)
PROT SERPL-MCNC: 6.7 G/DL (ref 6–8.4)
PROTHROMBIN TIME: 11.3 SEC (ref 9–12.5)
RBC # BLD AUTO: 4.31 M/UL (ref 4.6–6.2)
SODIUM SERPL-SCNC: 141 MMOL/L (ref 136–145)
SODIUM SERPL-SCNC: 148 MMOL/L (ref 136–145)
VIT B12 SERPL-MCNC: 856 PG/ML (ref 210–950)
WBC # BLD AUTO: 14.25 K/UL (ref 3.9–12.7)

## 2022-05-05 PROCEDURE — 97116 GAIT TRAINING THERAPY: CPT | Mod: CQ

## 2022-05-05 PROCEDURE — 11000001 HC ACUTE MED/SURG PRIVATE ROOM

## 2022-05-05 PROCEDURE — 99900035 HC TECH TIME PER 15 MIN (STAT)

## 2022-05-05 PROCEDURE — 99232 SBSQ HOSP IP/OBS MODERATE 35: CPT | Mod: ,,, | Performed by: NURSE PRACTITIONER

## 2022-05-05 PROCEDURE — 63700000 PHARM REV CODE 250 ALT 637 W/O HCPCS: Performed by: STUDENT IN AN ORGANIZED HEALTH CARE EDUCATION/TRAINING PROGRAM

## 2022-05-05 PROCEDURE — 85610 PROTHROMBIN TIME: CPT | Performed by: STUDENT IN AN ORGANIZED HEALTH CARE EDUCATION/TRAINING PROGRAM

## 2022-05-05 PROCEDURE — 99233 SBSQ HOSP IP/OBS HIGH 50: CPT | Mod: GC,,, | Performed by: HOSPITALIST

## 2022-05-05 PROCEDURE — 93005 ELECTROCARDIOGRAM TRACING: CPT

## 2022-05-05 PROCEDURE — 25000003 PHARM REV CODE 250: Performed by: STUDENT IN AN ORGANIZED HEALTH CARE EDUCATION/TRAINING PROGRAM

## 2022-05-05 PROCEDURE — 84425 ASSAY OF VITAMIN B-1: CPT | Performed by: STUDENT IN AN ORGANIZED HEALTH CARE EDUCATION/TRAINING PROGRAM

## 2022-05-05 PROCEDURE — 25000003 PHARM REV CODE 250: Performed by: HOSPITALIST

## 2022-05-05 PROCEDURE — 25000242 PHARM REV CODE 250 ALT 637 W/ HCPCS: Performed by: STUDENT IN AN ORGANIZED HEALTH CARE EDUCATION/TRAINING PROGRAM

## 2022-05-05 PROCEDURE — 82746 ASSAY OF FOLIC ACID SERUM: CPT | Performed by: STUDENT IN AN ORGANIZED HEALTH CARE EDUCATION/TRAINING PROGRAM

## 2022-05-05 PROCEDURE — A9698 NON-RAD CONTRAST MATERIALNOC: HCPCS | Performed by: NEUROLOGICAL SURGERY

## 2022-05-05 PROCEDURE — 97530 THERAPEUTIC ACTIVITIES: CPT | Mod: CQ

## 2022-05-05 PROCEDURE — 92611 MOTION FLUOROSCOPY/SWALLOW: CPT

## 2022-05-05 PROCEDURE — 97535 SELF CARE MNGMENT TRAINING: CPT

## 2022-05-05 PROCEDURE — 63600175 PHARM REV CODE 636 W HCPCS: Performed by: PHYSICIAN ASSISTANT

## 2022-05-05 PROCEDURE — 25000003 PHARM REV CODE 250

## 2022-05-05 PROCEDURE — 63600175 PHARM REV CODE 636 W HCPCS: Performed by: HOSPITALIST

## 2022-05-05 PROCEDURE — 94761 N-INVAS EAR/PLS OXIMETRY MLT: CPT

## 2022-05-05 PROCEDURE — 93010 ELECTROCARDIOGRAM REPORT: CPT | Mod: ,,, | Performed by: INTERNAL MEDICINE

## 2022-05-05 PROCEDURE — 93010 EKG 12-LEAD: ICD-10-PCS | Mod: ,,, | Performed by: INTERNAL MEDICINE

## 2022-05-05 PROCEDURE — 25000003 PHARM REV CODE 250: Performed by: PHYSICIAN ASSISTANT

## 2022-05-05 PROCEDURE — 99024 POSTOP FOLLOW-UP VISIT: CPT | Mod: ,,, | Performed by: PHYSICIAN ASSISTANT

## 2022-05-05 PROCEDURE — 99233 PR SUBSEQUENT HOSPITAL CARE,LEVL III: ICD-10-PCS | Mod: GC,,, | Performed by: HOSPITALIST

## 2022-05-05 PROCEDURE — 25500020 PHARM REV CODE 255: Performed by: NEUROLOGICAL SURGERY

## 2022-05-05 PROCEDURE — 83735 ASSAY OF MAGNESIUM: CPT | Performed by: STUDENT IN AN ORGANIZED HEALTH CARE EDUCATION/TRAINING PROGRAM

## 2022-05-05 PROCEDURE — 63600175 PHARM REV CODE 636 W HCPCS

## 2022-05-05 PROCEDURE — 99024 PR POST-OP FOLLOW-UP VISIT: ICD-10-PCS | Mod: ,,, | Performed by: PHYSICIAN ASSISTANT

## 2022-05-05 PROCEDURE — 80053 COMPREHEN METABOLIC PANEL: CPT | Performed by: STUDENT IN AN ORGANIZED HEALTH CARE EDUCATION/TRAINING PROGRAM

## 2022-05-05 PROCEDURE — 94640 AIRWAY INHALATION TREATMENT: CPT

## 2022-05-05 PROCEDURE — 36415 COLL VENOUS BLD VENIPUNCTURE: CPT | Performed by: STUDENT IN AN ORGANIZED HEALTH CARE EDUCATION/TRAINING PROGRAM

## 2022-05-05 PROCEDURE — 85730 THROMBOPLASTIN TIME PARTIAL: CPT | Performed by: STUDENT IN AN ORGANIZED HEALTH CARE EDUCATION/TRAINING PROGRAM

## 2022-05-05 PROCEDURE — 84295 ASSAY OF SERUM SODIUM: CPT

## 2022-05-05 PROCEDURE — 85025 COMPLETE CBC W/AUTO DIFF WBC: CPT | Performed by: STUDENT IN AN ORGANIZED HEALTH CARE EDUCATION/TRAINING PROGRAM

## 2022-05-05 PROCEDURE — 82607 VITAMIN B-12: CPT | Performed by: STUDENT IN AN ORGANIZED HEALTH CARE EDUCATION/TRAINING PROGRAM

## 2022-05-05 PROCEDURE — 63600175 PHARM REV CODE 636 W HCPCS: Performed by: STUDENT IN AN ORGANIZED HEALTH CARE EDUCATION/TRAINING PROGRAM

## 2022-05-05 PROCEDURE — 84100 ASSAY OF PHOSPHORUS: CPT | Performed by: STUDENT IN AN ORGANIZED HEALTH CARE EDUCATION/TRAINING PROGRAM

## 2022-05-05 PROCEDURE — 99232 PR SUBSEQUENT HOSPITAL CARE,LEVL II: ICD-10-PCS | Mod: ,,, | Performed by: NURSE PRACTITIONER

## 2022-05-05 RX ORDER — METRONIDAZOLE 500 MG/1
500 TABLET ORAL EVERY 8 HOURS
Status: DISCONTINUED | OUTPATIENT
Start: 2022-05-05 | End: 2022-05-06

## 2022-05-05 RX ORDER — SENNOSIDES 8.6 MG/1
8.6 TABLET ORAL 2 TIMES DAILY
Status: DISCONTINUED | OUTPATIENT
Start: 2022-05-05 | End: 2022-05-13 | Stop reason: HOSPADM

## 2022-05-05 RX ORDER — CEFEPIME HYDROCHLORIDE 1 G/50ML
1 INJECTION, SOLUTION INTRAVENOUS
Status: DISCONTINUED | OUTPATIENT
Start: 2022-05-05 | End: 2022-05-06

## 2022-05-05 RX ORDER — DEXTROSE MONOHYDRATE 50 MG/ML
INJECTION, SOLUTION INTRAVENOUS CONTINUOUS
Status: ACTIVE | OUTPATIENT
Start: 2022-05-05 | End: 2022-05-06

## 2022-05-05 RX ORDER — MORPHINE SULFATE 2 MG/ML
2 INJECTION, SOLUTION INTRAMUSCULAR; INTRAVENOUS ONCE
Status: DISCONTINUED | OUTPATIENT
Start: 2022-05-05 | End: 2022-05-11

## 2022-05-05 RX ORDER — LACTULOSE 10 G/15ML
10 SOLUTION ORAL EVERY 6 HOURS PRN
Status: DISCONTINUED | OUTPATIENT
Start: 2022-05-05 | End: 2022-05-13 | Stop reason: HOSPADM

## 2022-05-05 RX ADMIN — SODIUM CHLORIDE SOLN NEBU 3% 4 ML: 3 NEBU SOLN at 09:05

## 2022-05-05 RX ADMIN — POLYETHYLENE GLYCOL 3350 17 G: 17 POWDER, FOR SOLUTION ORAL at 08:05

## 2022-05-05 RX ADMIN — DEXAMETHASONE 4 MG: 4 TABLET ORAL at 08:05

## 2022-05-05 RX ADMIN — HEPARIN SODIUM 5000 UNITS: 5000 INJECTION INTRAVENOUS; SUBCUTANEOUS at 05:05

## 2022-05-05 RX ADMIN — METRONIDAZOLE 500 MG: 500 TABLET ORAL at 02:05

## 2022-05-05 RX ADMIN — ACETAMINOPHEN 1000 MG: 500 TABLET ORAL at 02:05

## 2022-05-05 RX ADMIN — HEPARIN SODIUM 5000 UNITS: 5000 INJECTION INTRAVENOUS; SUBCUTANEOUS at 08:05

## 2022-05-05 RX ADMIN — ACETAMINOPHEN 1000 MG: 500 TABLET ORAL at 08:05

## 2022-05-05 RX ADMIN — IPRATROPIUM BROMIDE AND ALBUTEROL SULFATE 3 ML: 2.5; .5 SOLUTION RESPIRATORY (INHALATION) at 09:05

## 2022-05-05 RX ADMIN — PIPERACILLIN SODIUM AND TAZOBACTAM SODIUM 4.5 G: 4; .5 INJECTION, POWDER, FOR SOLUTION INTRAVENOUS at 10:05

## 2022-05-05 RX ADMIN — ETHAMBUTOL HYDROCHLORIDE 800 MG: 400 TABLET, FILM COATED ORAL at 08:05

## 2022-05-05 RX ADMIN — SENNOSIDES 8.6 MG: 8.6 TABLET ORAL at 09:05

## 2022-05-05 RX ADMIN — IPRATROPIUM BROMIDE AND ALBUTEROL SULFATE 3 ML: 2.5; .5 SOLUTION RESPIRATORY (INHALATION) at 01:05

## 2022-05-05 RX ADMIN — QUETIAPINE FUMARATE 25 MG: 25 TABLET ORAL at 06:05

## 2022-05-05 RX ADMIN — PIPERACILLIN SODIUM AND TAZOBACTAM SODIUM 4.5 G: 4; .5 INJECTION, POWDER, FOR SOLUTION INTRAVENOUS at 02:05

## 2022-05-05 RX ADMIN — DEXTROSE: 5 SOLUTION INTRAVENOUS at 01:05

## 2022-05-05 RX ADMIN — PRAVASTATIN SODIUM 10 MG: 10 TABLET ORAL at 08:05

## 2022-05-05 RX ADMIN — LIDOCAINE 1 PATCH: 50 PATCH CUTANEOUS at 12:05

## 2022-05-05 RX ADMIN — AZITHROMYCIN MONOHYDRATE 500 MG: 250 TABLET ORAL at 08:05

## 2022-05-05 RX ADMIN — METRONIDAZOLE 500 MG: 500 TABLET ORAL at 08:05

## 2022-05-05 RX ADMIN — SENNOSIDES 8.6 MG: 8.6 TABLET ORAL at 08:05

## 2022-05-05 RX ADMIN — FAMOTIDINE 20 MG: 20 TABLET ORAL at 08:05

## 2022-05-05 RX ADMIN — BARIUM SULFATE 65 ML: 0.81 POWDER, FOR SUSPENSION ORAL at 02:05

## 2022-05-05 RX ADMIN — SILODOSIN 4 MG: 4 CAPSULE ORAL at 08:05

## 2022-05-05 RX ADMIN — HEPARIN SODIUM 5000 UNITS: 5000 INJECTION INTRAVENOUS; SUBCUTANEOUS at 02:05

## 2022-05-05 RX ADMIN — CEFEPIME HYDROCHLORIDE 1 G: 1 INJECTION, SOLUTION INTRAVENOUS at 04:05

## 2022-05-05 NOTE — PLAN OF CARE
Problem: Adult Inpatient Plan of Care  Goal: Plan of Care Review  Outcome: Ongoing, Progressing  Goal: Absence of Hospital-Acquired Illness or Injury  Outcome: Ongoing, Progressing  Goal: Optimal Comfort and Wellbeing  Outcome: Ongoing, Progressing     Problem: Urinary Elimination Impaired (Stroke, Ischemic/Transient Ischemic Attack)  Goal: Effective Urinary Elimination  Outcome: Ongoing, Progressing       Patient is AAO x4 with intermittent confusion. POC reviewed with patient. Patient verbalized understanding. Patient's breathing is unlabored with equal chest expansion. Patient has an incision on posterior neck. Patient wore C-collar. Floyd placed for urinary retention. Sitter at bedside. Patient remained free from falls. Patient rested well through shift. Bed in lowest position,bed alarm on, side rails up x3, no complaints or signs of distress. WCTM.  See flowsheets for full assessment and VS info.

## 2022-05-05 NOTE — SUBJECTIVE & OBJECTIVE
Interval History: NAEON. Pt HD stable after IVF yesterday. Urology consult for bilateral hydroureteronephrosis. Silodosin cont and urinary catheter placed.     Review of Systems   Constitutional:  Positive for fatigue. Negative for chills and fever.   HENT:  Negative for congestion and sore throat.    Respiratory:  Negative for cough and shortness of breath.    Cardiovascular:  Negative for chest pain, palpitations and leg swelling.   Gastrointestinal:  Positive for constipation. Negative for abdominal distention, abdominal pain, diarrhea, nausea and vomiting.   Genitourinary:  Negative for dysuria and frequency.   Musculoskeletal:  Negative for back pain and myalgias.   Skin:  Negative for color change and rash.   Neurological:  Negative for light-headedness and headaches.   Psychiatric/Behavioral:  Negative for confusion and hallucinations.    Objective:     Vital Signs (Most Recent):  Temp: 98.6 °F (37 °C) (05/05/22 0720)  Pulse: 65 (05/05/22 0840)  Resp: 18 (05/05/22 0720)  BP: (!) 122/59 (05/05/22 0720)  SpO2: 97 % (05/05/22 0720)   Vital Signs (24h Range):  Temp:  [96.2 °F (35.7 °C)-98.6 °F (37 °C)] 98.6 °F (37 °C)  Pulse:  [] 65  Resp:  [16-22] 18  SpO2:  [90 %-97 %] 97 %  BP: ()/(50-59) 122/59     Weight: 74.8 kg (165 lb)  Body mass index is 24.37 kg/m².    Intake/Output Summary (Last 24 hours) at 5/5/2022 0843  Last data filed at 5/5/2022 0601  Gross per 24 hour   Intake 1200 ml   Output 7100 ml   Net -5900 ml      Physical Exam  Constitutional:       General: He is not in acute distress.     Appearance: He is not ill-appearing or toxic-appearing.   HENT:      Head: Normocephalic and atraumatic.      Nose: No congestion or rhinorrhea.      Mouth/Throat:      Pharynx: No oropharyngeal exudate or posterior oropharyngeal erythema.   Eyes:      General: No scleral icterus.     Conjunctiva/sclera: Conjunctivae normal.   Cardiovascular:      Rate and Rhythm: Normal rate and regular rhythm.       Pulses: Normal pulses.      Heart sounds: Normal heart sounds.   Pulmonary:      Effort: Pulmonary effort is normal.      Breath sounds: Normal breath sounds.   Abdominal:      General: Abdomen is flat. Bowel sounds are normal. There is no distension.      Palpations: Abdomen is soft.      Tenderness: There is no abdominal tenderness. There is no guarding or rebound.   Musculoskeletal:         General: No swelling.      Right lower leg: No edema.      Left lower leg: No edema.   Skin:     General: Skin is warm.      Coloration: Skin is not jaundiced.   Neurological:      General: No focal deficit present.      Mental Status: He is alert.      Comments: Oriented to person, intermittently to time and place  Follows commands appropriately  Strength 4/5 throughout  Sensation intact throughout   Psychiatric:         Mood and Affect: Mood normal.         Behavior: Behavior normal.       Significant Labs: All pertinent labs within the past 24 hours have been reviewed.    Significant Imaging: I have reviewed all pertinent imaging results/findings within the past 24 hours.

## 2022-05-05 NOTE — PT/OT/SLP PROGRESS
"Physical Therapy Treatment    Patient Name:  Diego Gunter   MRN:  [id number]    Recommendations:     Discharge Recommendations:  rehabilitation facility   Discharge Equipment Recommendations:  (TBD)   Barriers to discharge: impaired functional mobility requiring increased assistance    Assessment:     Diego Gunter is a 81 y.o. male admitted with a medical diagnosis of Epidural hematoma.  He presents with the following impairments/functional limitations:  weakness, impaired endurance, impaired self care skills, impaired functional mobilty, gait instability, impaired balance, decreased safety awareness, decreased lower extremity function, impaired coordination, impaired fine motor, decreased upper extremity function, decreased ROM, impaired joint extensibility, orthopedic precautions. Pt tolerated session well with focus on bed mobility, transfers, and gait training. Pt progressing well with decreased assistance for bed mobility and gait training. Pt remains ataxic during gait with inconsistent step placement and B knee buckling noted throughout. B knee buckling improves slightly with tactile cueing but inconsistent with stable quad activation. Pt will continue to benefit from therapy services to address impairments listed above.     Rehab Prognosis: Good; patient would benefit from acute skilled PT services to address these deficits and reach maximum level of function.    Recent Surgery: Procedure(s) (LRB):  LAMINECTOMY, SPINE, CERVICAL, POSTERIOR APPROACH C3-6 (Bilateral) 7 Days Post-Op    Plan:     During this hospitalization, patient to be seen 4 x/week to address the identified rehab impairments via gait training, therapeutic activities, therapeutic exercises, neuromuscular re-education and progress toward the following goals:    · Plan of Care Expires:  05/30/22    Subjective     Chief Complaint: anxiousness/fear of falling  Patient/Family Comments/goals: "I'm just a bit worried about hurting myself or " "moving wrong."  Pain/Comfort:  · Pain Rating 1: 0/10  · Pain Rating Post-Intervention 1: 0/10      Objective:     Communicated with RN prior to session.  Patient found HOB elevated with telemetry, SCD, cervical collar upon PTA entry to room. Spouse at bedside.     General Precautions: Standard, aspiration, fall   Orthopedic Precautions:spinal precautions   Braces: Cervical collar  Respiratory Status: Room air     Functional Mobility:  · Bed Mobility:     · Rolling Left:  minimum assistance  · Supine to Sit: minimum assistance  · Transfers:     · Sit to Stand:  minimum assistance with rolling walker  · Bed to Chair: moderate assistance with  rolling walker  using  Step Transfer  · Gait: Pt ambulates 26 ft x 2 trials with RW and Mod A. Pt with ataxic gait, inconsistent B step placement, B knee buckling, and lateral instability.       AM-PAC 6 CLICK MOBILITY  Turning over in bed (including adjusting bedclothes, sheets and blankets)?: 3  Sitting down on and standing up from a chair with arms (e.g., wheelchair, bedside commode, etc.): 3  Moving from lying on back to sitting on the side of the bed?: 2  Moving to and from a bed to a chair (including a wheelchair)?: 2  Need to walk in hospital room?: 2  Climbing 3-5 steps with a railing?: 1  Basic Mobility Total Score: 13       Therapeutic Activities and Exercises:  Pt assisted with functional mobility as noted above.   Sit<>stand x 10 trials with RW and Min A. Focus on hand placement, sequencing, and eccentric control to sitting.   Pt educated on spinal precautions and brace fit/wear schedule.     Patient left up in chair with all lines intact, call button in reach, chair alarm on, RN notified and spouse present.    GOALS:   Multidisciplinary Problems     Physical Therapy Goals        Problem: Physical Therapy    Goal Priority Disciplines Outcome Goal Variances Interventions   Physical Therapy Goal     PT, PT/OT Ongoing, Progressing     Description: Goals to be met by: " 2022     Patient will increase functional independence with mobility by performin. Supine to sit with minimum assistance  2. Sit to supine with minimum assistance - met 2022  Updated: supervision while maintaining spinal precautions   3. Sit to stand transfer with minimum assistance - met 2022  Updated: supervision   4. Bed to chair transfer with minimum assistance using LRAD as needed  5. Gait  x 25 feet with minimum assistance using LRAD as needed  6. Lower extremity exercise program x10 reps per handout, with independence  7. Recall 3/3 spinal precautions and how to don/doff c-collar with supervision                    Time Tracking:     PT Received On: 22  PT Start Time: 914     PT Stop Time: 952  PT Total Time (min): 38 min     Billable Minutes: Gait Training 15 and Therapeutic Activity 23    Treatment Type: Treatment  PT/PTA: PTA     PTA Visit Number: 1     2022

## 2022-05-05 NOTE — SUBJECTIVE & OBJECTIVE
Interval History: Floyd replaced due to significant retention of ~2 L urine. Urology following. Patient neurologically stable. Aox3. Full strength. Tele sitter discontinued. Patient without restraints overnight. Final path on hematoma as blood clot - daptomycin discontinued. Blood pressure stable after fluids. Zosyn started for empiric coverage for gram neg and anaerobes. Appreciate assistance from multiple teams. Pending rehab.     Medications:  Continuous Infusions:   dextrose 5 %       Scheduled Meds:   acetaminophen  1,000 mg Oral TID    albuterol-ipratropium  3 mL Nebulization Q6H    azithromycin  500 mg Oral Daily    dexAMETHasone  4 mg Oral Q12H    Followed by    [START ON 5/6/2022] dexAMETHasone  2 mg Oral Q6H    Followed by    [START ON 5/9/2022] dexAMETHasone  2 mg Oral Q8H    Followed by    [START ON 5/10/2022] dexAMETHasone  2 mg Oral Q12H    Followed by    [START ON 5/13/2022] dexAMETHasone  2 mg Oral Daily    ethambutoL  800 mg Oral Daily    famotidine  20 mg Oral BID    heparin (porcine)  5,000 Units Subcutaneous Q8H    LIDOcaine  1 patch Transdermal Q24H    morphine  2 mg Intravenous Once    piperacillin-tazobactam (ZOSYN) IVPB  4.5 g Intravenous Q8H    polyethylene glycol  17 g Oral Daily    pravastatin  10 mg Oral Daily    QUEtiapine  25 mg Oral QHS    senna  8.6 mg Oral BID    silodosin  4 mg Oral Daily    sodium chloride 3%  4 mL Nebulization BID     PRN Meds:acetaminophen, dextrose 10%, dextrose 10%, glucagon (human recombinant), insulin aspart U-100, lactulose, melatonin, ondansetron, oxyCODONE     Review of Systems  Objective:     Weight: 74.8 kg (165 lb)  Body mass index is 24.37 kg/m².  Vital Signs (Most Recent):  Temp: 97.6 °F (36.4 °C) (05/05/22 1141)  Pulse: 62 (05/05/22 1141)  Resp: 19 (05/05/22 1141)  BP: (!) 125/58 (05/05/22 1141)  SpO2: 98 % (05/05/22 1141)   Vital Signs (24h Range):  Temp:  [96.2 °F (35.7 °C)-98.6 °F (37 °C)] 97.6 °F (36.4 °C)  Pulse:  [] 62  Resp:  [16-22]  "19  SpO2:  [90 %-98 %] 98 %  BP: ()/(50-59) 125/58                Oxygen Concentration (%):  [21] 21         Urethral Catheter 05/05/22 0200 Straight-tip;Non-latex (Active)   Site Assessment Clean;Intact 05/05/22 0200   Collection Container Standard drainage bag 05/05/22 0200   Securement Method secured to top of thigh w/ adhesive device 05/05/22 0200   Catheter Care Performed yes 05/05/22 0200   Reason for Continuing Urinary Catheterization Urinary retention 05/05/22 0200   CAUTI Prevention Bundle Securement Device in place with 1" slack;Intact seal between catheter & drainage tubing;Drainage bag/urimeter off the floor;Sheeting clip in use;No dependent loops or kinks;Drainage bag/urimeter not overfilled (<2/3 full);Drainage bag/urimeter below bladder 05/05/22 0200   Output (mL) 2300 mL 05/05/22 0200     Neurosurgery Physical Exam  General: well developed, well nourished, no distress.   Head: normocephalic, atraumatic  Neurologic: Alert and oriented. Thought content somewhat appropriate. Some conversation hard to follow, patient with higher level confusion  GCS: Motor: 6/Verbal: 5/Eyes: 4 GCS Total: 15  Mental Status: Awake, Alert, Oriented x 3   Language: No aphasia  Speech: No dysarthria  Cranial nerves: face symmetric, tongue midline, CN II-XII grossly intact.   Eyes: pupils equal, round, reactive to light with accommodation, EOMI.   Pulmonary: normal respirations, no signs of respiratory distress  Abdomen: soft,slightly distended  Skin: Skin is warm, dry and intact.  Sensory: intact to light touch throughout     Motor Strength:Moves all extremities spontaneously with good tone.  Full strength upper and lower extremities. No abnormal movements seen.      Strength   Deltoids Triceps Biceps Wrist Extension Wrist Flexion Hand    Upper: R 5/5 5/5 5/5 5/5 5/5 5/5     L 5/5 5/5 5/5 5/5 5/5 5/5       Iliopsoas Quadriceps Knee  Flexion Tibialis  anterior Gastro- cnemius EHL   Lower: R 5/5 5/5 5/5 5/5 5/5 5/5    "  L 5/5 5/5 5/5 5/5 5/5 5/5      Posterior cervical incision: Clean, dry, staples intact. Skin edges well approximated. No surrounding erythema or edema. No drainage or TTP.        Significant Labs:  Recent Labs   Lab 05/03/22  1543 05/04/22  0755 05/05/22  1001   * 119* 147*    144 148*   K 3.6 3.7 3.6    106 113*   CO2 26 29 27   BUN 15 18 16   CREATININE 0.8 0.9 0.9   CALCIUM 9.1 8.3* 9.3   MG  --  2.2 2.2     Recent Labs   Lab 05/04/22  0755 05/05/22  1001   WBC 17.81* 14.25*   HGB 10.7* 11.9*   HCT 35.6* 37.6*    194     Recent Labs   Lab 05/04/22  0755 05/05/22  1001   INR 1.1 1.1   APTT 29.2 30.3     Microbiology Results (last 7 days)       Procedure Component Value Units Date/Time    AFB Culture & Smear [[id number]]     Order Status: No result Specimen: Respiratory from Sputum, Expectorated     Blood culture [[id number]] Collected: 05/03/22 1542    Order Status: Completed Specimen: Blood from Peripheral, Left Hand Updated: 05/04/22 1612     Blood Culture, Routine No Growth to date      No Growth to date    Narrative:      Collection has been rescheduled by [facility] at 05/03/2022 12:38 Reason:   Patient unavailable going yo ct will yry back  Collection has been rescheduled by [facility] at 05/03/2022 12:41 Reason:   Spe with sabino stanton Patient unavailable going to ct  Collection has been rescheduled by [facility] at 05/03/2022 14:03 Reason:   Patient unavailable in ultrasound sabino valenzuela said come back in an hr   Collection has been rescheduled by [facility] at 05/03/2022 12:38 Reason:   Patient unavailable going yo ct will yry back  Collection has been rescheduled by [facility] at 05/03/2022 12:41 Reason:   Spe with sabino stanton Patient unavailable going to ct  Collection has been rescheduled by [facility] at 05/03/2022 14:03 Reason:   Patient unavailable in ultrasound sabino valenzuela said come back in an hr     Blood culture [[id number]] Collected: 05/03/22 1543    Order Status: Completed Specimen: Blood from Peripheral, Right Hand Updated:  05/04/22 1612     Blood Culture, Routine No Growth to date      No Growth to date    Narrative:      Collection has been rescheduled by [facility] at 05/03/2022 12:38 Reason:   Patient unavailable going yo ct will yry back  Collection has been rescheduled by [facility] at 05/03/2022 12:41 Reason:   Spe with sabino stanton Patient unavailable going to ct  Collection has been rescheduled by [facility] at 05/03/2022 14:03 Reason:   Patient unavailable in ultrasound sabino valenzuela said come back in an hr   Collection has been rescheduled by [facility] at 05/03/2022 12:38 Reason:   Patient unavailable going yo ct will yry back  Collection has been rescheduled by [facility] at 05/03/2022 12:41 Reason:   Spe with sabino stanton Patient unavailable going to ct  Collection has been rescheduled by 6 at 05/03/2022 14:03 Reason:   Patient unavailable in ultrasound sabino valenzuela said come back in an hr     Culture, Respiratory with Gram Stain [[id number]]     Order Status: No result Specimen: Respiratory     Blood culture [[id number]] Collected: 04/28/22 1201    Order Status: Completed Specimen: Blood from Peripheral, Forearm, Right Updated: 05/02/22 1303     Blood Culture, Routine No Growth after 4 days.     Blood culture [[id number]] Collected: 04/28/22 1201    Order Status: Completed Specimen: Blood from Peripheral, Hand, Left Updated: 05/02/22 1303     Blood Culture, Routine No Growth after 4 days.           All pertinent labs from the last 24 hours have been reviewed.    Significant Diagnostics:  I have reviewed all pertinent imaging results/findings within the past 24 hours.

## 2022-05-05 NOTE — PROGRESS NOTES
[facility] ([facility])  Physical Medicine & Rehab  Progress Note    Patient Name: Diego Gunter  MRN: [id number]  Admission Date: 4/28/2022  Length of Stay: 7 days  Attending Physician: Carlos Miller MD    Subjective:     Principal Problem:Epidural hematoma    Hospital Course:   5/2/22: Ambulated 6 steps modA x 2 ppl.    5/4/22: Ambulated 12 ft HHA- maxA x 2 ppl.       Interval History 5/5/2022:  Patient is seen for follow-up PM&R evaluation and recommendations: Participated w/ therapy today and doing well.     HPI, Past Medical, Family, and Social History remains the same as documented in the initial encounter.    Scheduled Medications:    acetaminophen  1,000 mg Oral TID    albuterol-ipratropium  3 mL Nebulization Q6H    azithromycin  500 mg Oral Daily    dexAMETHasone  4 mg Oral Q12H    Followed by    [START ON 5/6/2022] dexAMETHasone  2 mg Oral Q6H    Followed by    [START ON 5/9/2022] dexAMETHasone  2 mg Oral Q8H    Followed by    [START ON 5/10/2022] dexAMETHasone  2 mg Oral Q12H    Followed by    [START ON 5/13/2022] dexAMETHasone  2 mg Oral Daily    ethambutoL  800 mg Oral Daily    famotidine  20 mg Oral BID    heparin (porcine)  5,000 Units Subcutaneous Q8H    LIDOcaine  1 patch Transdermal Q24H    morphine  2 mg Intravenous Once    piperacillin-tazobactam (ZOSYN) IVPB  4.5 g Intravenous Q8H    polyethylene glycol  17 g Oral Daily    pravastatin  10 mg Oral Daily    QUEtiapine  25 mg Oral QHS    senna  8.6 mg Oral BID    silodosin  4 mg Oral Daily    sodium chloride 3%  4 mL Nebulization BID       Diagnostic Results: Labs: Reviewed  ECG: Reviewed  CT: Reviewed    PRN Medications: acetaminophen, dextrose 10%, dextrose 10%, glucagon (human recombinant), insulin aspart U-100, lactulose, melatonin, ondansetron, oxyCODONE    Review of Systems   Constitutional:  Positive for activity change.   Gastrointestinal:  Negative for constipation and diarrhea.   Musculoskeletal:  Positive  for gait problem.   Neurological:  Positive for weakness.   Psychiatric/Behavioral:  Positive for behavioral problems and confusion.    Objective:     Vital Signs (Most Recent):  Temp: 97.6 °F (36.4 °C) (05/05/22 1141)  Pulse: 62 (05/05/22 1141)  Resp: 19 (05/05/22 1141)  BP: (!) 125/58 (05/05/22 1141)  SpO2: 98 % (05/05/22 1141)      Vital Signs (24h Range):  Temp:  [96.2 °F (35.7 °C)-98.6 °F (37 °C)] 97.6 °F (36.4 °C)  Pulse:  [] 62  Resp:  [16-22] 19  SpO2:  [90 %-98 %] 98 %  BP: ()/(50-59) 125/58     Physical Exam  Vitals and nursing note reviewed.   Constitutional:       Appearance: He is well-developed.   HENT:      Head: Normocephalic and atraumatic.   Eyes:      General:         Right eye: No discharge.         Left eye: No discharge.      Pupils: Pupils are equal, round, and reactive to light.   Pulmonary:      Effort: Pulmonary effort is normal. No respiratory distress.   Abdominal:      General: There is no distension.      Palpations: Abdomen is soft.      Tenderness: There is no abdominal tenderness.   Musculoskeletal:         General: No deformity.      Cervical back: Neck supple.      Comments: BLE weakness present   Skin:     General: Skin is warm and dry.   Neurological:      Mental Status: He is alert.      Sensory: No sensory deficit.      Motor: Weakness present. No abnormal muscle tone.      Comments: - confusion present- improving   Psychiatric:         Behavior: Behavior normal.      Comments:   - camera sitter         Assessment/Plan:      * Epidural hematoma  - s/p C3-C6 decompression of epidural hematoma and posterior fusion on 4/28     Impaired mobility  - Related to prolonged/acute hospital course.     Recommendations  -  Encourage mobility, OOB in chair at least 3 hours per day, and early ambulation as appropriate  -  PT/OT evaluate and treat  -  Pain management  -  Monitor for and prevent skin breakdown and pressure ulcers  · Early mobility, repositioning/weight shifting  every 20-30 minutes when sitting, turn patient every 2 hours, proper mattress/overlay and chair cushioning, pressure relief/heel protector boots  -  DVT prophylaxis    -  Reviewed discharge options (IP rehab, SNF, HH therapy, and OP therapy)    PM&R Recommendation:     At this time, the PM&R team has reviewed this patient's ongoing medical case including inpatient diagnosis, medical history, clinical examination, labs, vitals, current social and functional history to provide the post-acute recommendation as follows:     RECOMMENDATIONS: inpatient rehabilitation due to good motivation/participation with therapies and good potential for recovery.    MEDICAL STABILITY:     At this time, barriers for post-acute rehab admission removal of camera sitter and MBSS plan.    I will sign off with the transfer of the patient to [facility] liaison who will continue to follow going forward until admission to [facility].      Marilee Kowalski NP  Department of Physical Medicine & Rehab   [facility]surgery [facility])

## 2022-05-05 NOTE — PLAN OF CARE
[facility] (Hospital)  Discharge Reassessment    Primary Care Provider: Portia Ferreira MD    Expected Discharge Date: 5/6/2022    Patient is pending rehab.  Not medically stable.  Patient had urinary retention and amaya placed. Patient had tele sitter which will be removed from patient.  Plan for [facility] to submit for insurance auth.      Reassessment (most recent)     Discharge Reassessment - 05/05/22 1443        Discharge Reassessment    Assessment Type Discharge Planning Reassessment     Did the patient's condition or plan change since previous assessment? No     Discharge Plan discussed with: Patient;Spouse/sig other     Communicated MALIA with patient/caregiver Yes     Discharge Plan A Rehab     Discharge Plan B Skilled Nursing Facility     DME Needed Upon Discharge  none     Discharge Barriers Identified None     Why the patient remains in the hospital Requires continued medical care        Post-Acute Status    Post-Acute Authorization Placement     Post-Acute Placement Status Pending medical clearance/testing     Discharge Delays None known at this time

## 2022-05-05 NOTE — PROCEDURES
Modified Barium Swallow    Patient Name:  Diego Gunter   MRN:  [id number]      Recommendations:     Recommendations:                General Recommendations:  Dysphagia therapy  Diet recommendations:  Mechanical soft, Thin   Per IDDSI guidelines, all items must be minced and moist. Please avoid mixed consistencies (such as cereals, soups with large pieces of meat/vegetable, etc.), avoid dry/coarse/crumbly items such as rice ,nuts, seeds, dried fruit, coconut, avoid fibrous foods, avoid tough skins, avoid tough vegetables (i.e. no corn, brussel sprouts,etc) avoid chewy/sticky foods (i.e. no peanut putter, caramel, licorice, etc.)    Aspiration Precautions: 1 bite/sip at a time, Double swallow with each bite/sip, Eliminate distractions, Feed only when awake/alert, Frequent oral care, HOB to 90 degrees, Meds crushed in puree, Remain upright 30 minutes post meal, Small bites/sips and Strict aspiration precautions. Please continue to monitor for signs and symptoms of aspiration and discontinue oral feeding should you notice any of the following: watery eyes, reddened facial area, wet vocal quality, increased work of breathing, change in respiratory status, increased congestion, coughing, fever and/or change in level of alertness.  General Precautions: Standard, aspiration, fall  Communication strategies:  go to room if call light pushed    Referral     Reason for Referral  Patient was referred for a Modified Barium Swallow Study to assess the efficiency of his/her swallow function, rule out aspiration and make recommendations regarding safe dietary consistencies, effective compensatory strategies, and safe eating environment.     Diagnosis: Epidural hematoma       History:     Past Medical History:   Diagnosis Date    Antiphospholipid syndrome 11/19/2021    Dysphagia     Hx of colonic polyps     followed by GI. Dr. Pham    Hyperlipidemia LDL goal <100     Overweight(278.02)     Prostate cancer september 2011     followed by urology, Dr. Rogers    Type II or unspecified type diabetes mellitus without mention of complication, not stated as uncontrolled     diet controlled       Objective:     Current Respiratory Status: 05/05/22    Alert: yes    Cooperative: yes    Follows Directions: yes    Visualization  · Patient was seen in the lateral view   · Cervical hardware/edema observed    Oral Peripheral Examination  · Oral Musculature: general weakness  · Dentition: upper dentures  · Secretion Management: adequate  · Mucosal Quality: adequate  · Oral Labial Strength and Mobility: WNL  · Lingual Strength and Mobility: WNL  · Velar Elevation: WNL  · Volitional Cough: present, adequate strength  · Volitional Swallow: elicited  · Voice Prior to PO Intake: clear, adequate intensity    Consistencies Assessed  · Thin 65 mL via teaspoon and straw sips fed by clinician   · Nectar thick : 5 mL via teaspoon sip fed by clinician   · Puree : 10 ml via teaspoon presentations fed by clinician  · Solids : bite of saltine cracker coated with ~ 2mL barium paste     Oral Preparation/Oral Phase  · Pt with adequate bolus acceptance, containment, control   · Pt with mildly delayed A-P transfer across consistencies     Pharyngeal Phase   Patient with overall mildly delayed initiation of swallow with mildly decreased laryngeal elevation and anterior hyoid excursion, adequate tongue base retraction, sluggish epiglottic inversion, incomplete laryngeal vestibular closure, mildly decreased pharyngeal stripping wave force with moderate pharyngeal residue in valleculae and pyriforms mainly with solids.     Thin Liquids  - Pt with mildly delayed initiation of swallow with premature spillage to the pyriforms  - Pt with flash penetration before the swallow with larger straw sip thin liquids  - Pt with mild-moderate residuals in valleculae and pyriforms post swallow  - Pt cued to use subsequent swallow to clear  - use of throat clear and multiple swallows  partially effective in clearing residuals  - use of small bolus size partially effective in reducing penetration risk   - when fluoro resumed, trace contents of residuals in valleculae noted to coat underbelly of epiglottis. Patient was cued to use effortful swallow to clear.     Nectar-thickened liquids  - Pt with mildly delayed initiation of swallow with premature spillage to the pyriforms  - Pt with flash penetration before the swallow  - no aspiration visualized   - Pt with mild residuals noted to remain in valleculae post swallow  -  Use of second swallow partially effective in clearing residuals     Puree  - Pt with mildly delayed initiation of swallow  - No penetration/aspiration visualized  - Pt with mild residuals noted to remain in valleculae post swallow   - Use of effortful, second swallow partially effective in clearing residuals     Solid   - Pt with mildly delayed initiation of swallow with premature spillage to the valleculae  - no penetration/aspiration  - moderate residuals noted to remain in valleculae post swallow  - use of second swallow and throat clear minimally effective in clearing residuals  - use of teaspoon bite of puree effective in clearing solid residuals   - patient with risk of penetration/aspiration of solid residuals     Cervical Esophageal Phase  · UES appeared to accommodate all bolus types without stasis or retrograde movement observed     Assessment:     Impressions  · Patient demonstrates mild pharyngeal dysphagia characterized by flash penetration of thin liquids and moderate residuals post swallow.  Use of small bolus size partially effective in reducing penetration risk. Use of throat clear and multiple swallows partially effective in clearing residuals.  Patient remains at risk of penetration/aspiration of solid residuals following the swallow. Safest means for nutrition at this time would be mechanically soft/chopped textures, (avoid mixed consistencies), thin liquids,  medications crushed in puree, provided patient uses small, single bites/sips, double swallows, upright with all PO, minimal distractions, avoid talking while eating and remain upright for at least 30 minutes following PO; however, risk of aspiration remains 2/2 decreased endurance, cognition and fatigue.      Prognosis: Good    Barriers:  · Fatigue  · Dependent feeding    Plan  ST to continue to follow for dysphagia therapy and ongoing education on dietary modifications and aspiration precautions.     Education  Results were discussed with patient. Results were discussed with Medical Team who was in agreement with plan.  results were reviewed with Patient immediately following procedure. Results were reviewed with Spouse later serviec day.     Goals:   Multidisciplinary Problems     SLP Goals        Problem: SLP    Goal Priority Disciplines Outcome   SLP Goal    Low SLP Ongoing, Progressing   Description: Speech Language Pathology Goals  Goals expected to be met by 5/6:  1. Patient will tolerate a puree diet and nectar thickened liquids with no overt signs of airway compromise.   2. Patient will answer simple yes/no questions with 50% acc indep.   3. Patient will complete auto speech tasks with 60% acc given min A.   4. Patient will follow simple commands with 60% acc given min A.   5. Patient will participate in further assessments as appropriate.                              Plan:   · Patient to be seen:  Therapy Frequency: 4 x/week   · Plan of Care expires:     · Plan of Care reviewed with:  patient, spouse        Discharge recommendations:  rehabilitation facility       Time Tracking:   SLP Treatment Date:   05/05/22  Speech Start Time:  1501  Speech Stop Time:  1518     Speech Total Time (min):  17 min    05/05/2022

## 2022-05-05 NOTE — ASSESSMENT & PLAN NOTE
Mr. Gunter is a 81 year-old male with antiphospholipid syndrome (diagnosed 11/2021, on warfarin), PE, HLD, MAC (diagnosed in 2018, on chronic ethambutol and azithromycin), previous prostate ca (diagnosed in 2011, s/p prostatectomy) who presented on 04/28 with an epidural hematoma in C3-C6. He is s/p spinal decompression and fluid evacuation with NSGY on 04/28. Course has been complicated by shock with intermittent pressor requirements (off pressors since 05/01) and waxing/waning encephalopathy, for which medicine was consulted for.     Suspect encephalopathy is multifactorial.   Contributing factors include delirium vs medications (was on scheduled methocarbamol, pregabalin, and steroids) vs electrolyte imbalances (hypernatremic) vs infection (new leukocytosis).    As part of the workup, he has undergone video EEG monitoring from 05/01-05/02 without epileptiform changes. Additionally underwent a CTH 05/03 without acute changes.     Still suspect waxing/waning mentation and hallucinations are related to delirium. Now improving from yesterday.    --continue multimodal pain regimen and avoid sedatives as allowed  --follow infectious workup  --continue daily miralax and senna, PRN lactulose  --urinary retention with bilateral hydroureteronephrosis, urology following. Catheter in place.

## 2022-05-05 NOTE — ASSESSMENT & PLAN NOTE
Secondary to dehydration  IVF, LR 125cc/hr and encourage enteral water intake as patient has post obstructive diuresis

## 2022-05-05 NOTE — HPI
Diego Gunter is a 80 yo M with PMH antiphospholipid syndrome on chronic coumadin for left PE, DM2, prostate ca s/p prostatectomy 2011, MAC pneumonia dx Fall 2018, mild cognitive impairment, and HLD who was transferred from OSH for compressive epidural hematoma in the cervical spine. Urology consulted for b/l hydro. During this admission, he has had multiple catheter placements with high volume UOP. When speaking to him, he was hard to understand upon questioning. He has not had any urinary issues at home. No dysuria or hematuria. He does not have a history of retention.    CT A/P yesterday showed b/l moderate hydronephrosis and a largely distended bladder. Floyd catheter placed last night with 2.3L of clear urine output. Floyd in place this am. During this admission he has a history of high urine output. There are concerns for polyuria due to head trauma at this time. Cr 0.9, Hgb 10.7, WBC 17.8. UA negative for blood, leuks, nitrite.

## 2022-05-05 NOTE — PT/OT/SLP PROGRESS
"Speech Language Pathology Treatment    Patient Name:  Diego Gunter   MRN:  [id number]  Admitting Diagnosis: Epidural hematoma    Recommendations:                 General Recommendations:  Dysphagia therapy and Cognitive-linguistic therapy  Diet recommendations:  Mechanical Soft textures (no mixed consistencies), Liquid Diet Level: thinNOTE: All items must be minced and moist. Please avoid mixed consistencies (such as cereals, soups with large pieces of meat/vegetable, etc.), avoid dry/coarse/crumbly items such as rice ,nuts, seeds, dried fruit, coconut, avoid fibrous foods, avoid tough skins, avoid tough vegetables (i.e. no corn, brussel sprouts,etc) avoid chewy/sticky foods (i.e. no peanut putter, caramel, licorice, etc.)    Aspiration Precautions: 1 bite/sip at a time, Double swallow with each bite/sip, Eliminate distractions, Feed only when awake/alert, Frequent oral care, HOB to 90 degrees, Meds crushed in puree, Remain upright 30 minutes post meal, Small bites/sips and Strict aspiration precautions. Please continue to monitor for signs and symptoms of aspiration and discontinue oral feeding should you notice any of the following: watery eyes, reddened facial area, wet vocal quality, increased work of breathing, change in respiratory status, increased congestion, coughing, fever and/or change in level of alertness.  General Precautions: Standard, aspiration, fall  Communication strategies:  go to room if call light pushed    Subjective     SLP reviewed Pt with RN, RN aware of MBSS procedure later service day  Pt presents calm  He explains, "I've had one before"    Pain/Comfort:  · Pain Rating 1: 0/10    Respiratory Status: Room air    Objective:     Has the patient been evaluated by SLP for swallowing?   Yes  Keep patient NPO? No   Current Respiratory Status:        SLP met with Patient and Spouse to review plan for Modified Barium Swallow Study later service day. He was found awake and upright in chair in " room with c-collar in place.  He was alert and attentive. He was educated on MBSS procedure, swallow anatomy and safety precautions. He verbalized understanding. Spouse in room t/o session and verbalized understanding. No additional questions. Whiteboard current.   Later service day, following MBSS, SLP met with patient and Spouse to review findings. Pt was found awake and upright in bed with c-collar in place. Spouse and RN at bedside. RN exited room as SLP continued session. Pt and Spouse were educated on swallow anatomy, penetration/aspiration risk, dietary modifications for mechanical soft textures, safe swallow strategies and safety/aspiration precautions including S/S aspiration for ongoing monitoring. Pt and Spouse verbalized understanding. No additional questions. Whiteboard updated. Pt remained upright in bed with call light in reach, Spouse at bedside, upon SLP exit.     Assessment:     Diego Gunter is a 81 y.o. male with an SLP diagnosis of Dysphagia.     Goals:   Multidisciplinary Problems     SLP Goals        Problem: SLP    Goal Priority Disciplines Outcome   SLP Goal    Low SLP Ongoing, Progressing   Description: Speech Language Pathology Goals  Goals expected to be met by 5/6:  1. Patient will tolerate a puree diet and nectar thickened liquids with no overt signs of airway compromise.   2. Patient will answer simple yes/no questions with 50% acc indep.   3. Patient will complete auto speech tasks with 60% acc given min A.   4. Patient will follow simple commands with 60% acc given min A.   5. Patient will participate in further assessments as appropriate.                              Plan:     · Patient to be seen:  4 x/week   · Plan of Care expires:     · Plan of Care reviewed with:  patient, spouse   · SLP Follow-Up:  Yes       Discharge recommendations:  rehabilitation facility       Time Tracking:     SLP Treatment Date:   05/05/22  Speech Start Time:  0958/16:10  Speech Stop Time:  100  /16:22    Speech Total Time (min):  10 min/12 min  +Pt seen across multiple sessions today 2/2 MBSS procedure   Billable Minutes: Self Care/Home Management Training 22 05/05/2022

## 2022-05-05 NOTE — PLAN OF CARE
Problem: Adult Inpatient Plan of Care  Goal: Plan of Care Review  Outcome: Ongoing, Progressing  Goal: Patient-Specific Goal (Individualized)  Outcome: Ongoing, Progressing  Goal: Absence of Hospital-Acquired Illness or Injury  Outcome: Ongoing, Progressing  Goal: Optimal Comfort and Wellbeing  Outcome: Ongoing, Progressing     Problem: Fall Injury Risk  Goal: Absence of Fall and Fall-Related Injury  Outcome: Ongoing, Progressing     Problem: Infection  Goal: Absence of Infection Signs and Symptoms  Outcome: Ongoing, Progressing     Problem: Diabetes Comorbidity  Goal: Blood Glucose Level Within Targeted Range  Outcome: Ongoing, Progressing     Problem: Adjustment to Illness (Stroke, Hemorrhagic)  Goal: Optimal Coping  Outcome: Ongoing, Progressing     Problem: Cerebral Tissue Perfusion (Stroke, Hemorrhagic)  Goal: Optimal Cerebral Tissue Perfusion  Outcome: Ongoing, Progressing     Problem: Swallowing Impairment (Stroke, Hemorrhagic)  Goal: Optimal Eating and Swallowing Without Aspiration  Outcome: Ongoing, Progressing     Problem: Urinary Elimination Impaired (Stroke, Hemorrhagic)  Goal: Effective Urinary Elimination  Outcome: Ongoing, Progressing     Problem: Adjustment to Illness (Delirium)  Goal: Optimal Coping  Outcome: Ongoing, Progressing     Problem: Altered Behavior (Delirium)  Goal: Improved Behavioral Control  Outcome: Ongoing, Progressing    POC reviewed with the patient and they verbalized understanding. All comments and concerns addressed. Bed locked in lowest position with bed alarm set, call light within reach. Safety precautions maintained. VSS, see flowsheets. No events this shift. Will continue to monitor for changes to POC and clinical condition.

## 2022-05-05 NOTE — PROGRESS NOTES
[facility] - Neurosurgery ([facility])  Neurosurgery  Progress Note    Subjective:     History of Present Illness: Patient is a 80 y/o male with antiphospholipid syndrome on chronic coumadin, MAC pneumonia, HLD, and left PE diagnosed January 2021 who was transferred from  for compressive epidural hematoma in the cervical spine. Patient reports that he woke up in the middle of the night and could no move the right side of his body. He went to  ED and stroke code initiated. Patient was not given tPA but given ASA. CTA was negative for acute CVA but did show compressive fluid collective from C3-C6. MRI cervical spine confirmed. Patient was transferred to [facility] ED for neurosurgical evaluation. He reports he cannot move his right arm out of the contracted position, he also cannot lift his right leg off the bed. He reports the right arm and leg are painful. No complaints on left side of body. Denies b/b dysfunction.       Post-Op Info:  Procedure(s) (LRB):  LAMINECTOMY, SPINE, CERVICAL, POSTERIOR APPROACH C3-6 (Bilateral)   7 Days Post-Op     Interval History: Floyd replaced due to significant retention of ~2 L urine. Urology following. Patient neurologically stable. Aox3. Full strength. Tele sitter discontinued. Patient without restraints overnight. Final path on hematoma as blood clot - daptomycin discontinued. Blood pressure stable after fluids. Zosyn started for empiric coverage for gram neg and anaerobes. Appreciate assistance from multiple teams. Pending rehab.     Medications:  Continuous Infusions:   dextrose 5 %       Scheduled Meds:   acetaminophen  1,000 mg Oral TID    albuterol-ipratropium  3 mL Nebulization Q6H    azithromycin  500 mg Oral Daily    dexAMETHasone  4 mg Oral Q12H    Followed by    [START ON 5/6/2022] dexAMETHasone  2 mg Oral Q6H    Followed by    [START ON 5/9/2022] dexAMETHasone  2 mg Oral Q8H    Followed by    [START ON 5/10/2022] dexAMETHasone  2 mg Oral Q12H    Followed by    [START  "ON 5/13/2022] dexAMETHasone  2 mg Oral Daily    ethambutoL  800 mg Oral Daily    famotidine  20 mg Oral BID    heparin (porcine)  5,000 Units Subcutaneous Q8H    LIDOcaine  1 patch Transdermal Q24H    morphine  2 mg Intravenous Once    piperacillin-tazobactam (ZOSYN) IVPB  4.5 g Intravenous Q8H    polyethylene glycol  17 g Oral Daily    pravastatin  10 mg Oral Daily    QUEtiapine  25 mg Oral QHS    senna  8.6 mg Oral BID    silodosin  4 mg Oral Daily    sodium chloride 3%  4 mL Nebulization BID     PRN Meds:acetaminophen, dextrose 10%, dextrose 10%, glucagon (human recombinant), insulin aspart U-100, lactulose, melatonin, ondansetron, oxyCODONE     Review of Systems  Objective:     Weight: 74.8 kg (165 lb)  Body mass index is 24.37 kg/m².  Vital Signs (Most Recent):  Temp: 97.6 °F (36.4 °C) (05/05/22 1141)  Pulse: 62 (05/05/22 1141)  Resp: 19 (05/05/22 1141)  BP: (!) 125/58 (05/05/22 1141)  SpO2: 98 % (05/05/22 1141)   Vital Signs (24h Range):  Temp:  [96.2 °F (35.7 °C)-98.6 °F (37 °C)] 97.6 °F (36.4 °C)  Pulse:  [] 62  Resp:  [16-22] 19  SpO2:  [90 %-98 %] 98 %  BP: ()/(50-59) 125/58                Oxygen Concentration (%):  [21] 21         Urethral Catheter 05/05/22 0200 Straight-tip;Non-latex (Active)   Site Assessment Clean;Intact 05/05/22 0200   Collection Container Standard drainage bag 05/05/22 0200   Securement Method secured to top of thigh w/ adhesive device 05/05/22 0200   Catheter Care Performed yes 05/05/22 0200   Reason for Continuing Urinary Catheterization Urinary retention 05/05/22 0200   CAUTI Prevention Bundle Securement Device in place with 1" slack;Intact seal between catheter & drainage tubing;Drainage bag/urimeter off the floor;Sheeting clip in use;No dependent loops or kinks;Drainage bag/urimeter not overfilled (<2/3 full);Drainage bag/urimeter below bladder 05/05/22 0200   Output (mL) 2300 mL 05/05/22 0200     Neurosurgery Physical Exam  General: well developed, well " nourished, no distress.   Head: normocephalic, atraumatic  Neurologic: Alert and oriented. Thought content somewhat appropriate. Some conversation hard to follow, patient with higher level confusion  GCS: Motor: 6/Verbal: 5/Eyes: 4 GCS Total: 15  Mental Status: Awake, Alert, Oriented x 3   Language: No aphasia  Speech: No dysarthria  Cranial nerves: face symmetric, tongue midline, CN II-XII grossly intact.   Eyes: pupils equal, round, reactive to light with accommodation, EOMI.   Pulmonary: normal respirations, no signs of respiratory distress  Abdomen: soft,slightly distended  Skin: Skin is warm, dry and intact.  Sensory: intact to light touch throughout     Motor Strength:Moves all extremities spontaneously with good tone.  Full strength upper and lower extremities. No abnormal movements seen.      Strength   Deltoids Triceps Biceps Wrist Extension Wrist Flexion Hand    Upper: R 5/5 5/5 5/5 5/5 5/5 5/5     L 5/5 5/5 5/5 5/5 5/5 5/5       Iliopsoas Quadriceps Knee  Flexion Tibialis  anterior Gastro- cnemius EHL   Lower: R 5/5 5/5 5/5 5/5 5/5 5/5     L 5/5 5/5 5/5 5/5 5/5 5/5      Posterior cervical incision: Clean, dry, staples intact. Skin edges well approximated. No surrounding erythema or edema. No drainage or TTP.        Significant Labs:  Recent Labs   Lab 05/03/22  1543 05/04/22  0755 05/05/22  1001   * 119* 147*    144 148*   K 3.6 3.7 3.6    106 113*   CO2 26 29 27   BUN 15 18 16   CREATININE 0.8 0.9 0.9   CALCIUM 9.1 8.3* 9.3   MG  --  2.2 2.2     Recent Labs   Lab 05/04/22  0755 05/05/22  1001   WBC 17.81* 14.25*   HGB 10.7* 11.9*   HCT 35.6* 37.6*    194     Recent Labs   Lab 05/04/22  0755 05/05/22  1001   INR 1.1 1.1   APTT 29.2 30.3     Microbiology Results (last 7 days)       Procedure Component Value Units Date/Time    AFB Culture & Smear [[id number]]     Order Status: No result Specimen: Respiratory from Sputum, Expectorated     Blood culture [[id number]] Collected:  05/03/22 1542    Order Status: Completed Specimen: Blood from Peripheral, Left Hand Updated: 05/04/22 1612     Blood Culture, Routine No Growth to date      No Growth to date    Narrative:      Collection has been rescheduled by TH6 at 05/03/2022 12:38 Reason:   Patient unavailable going yo ct will yry back  Collection has been rescheduled by TH6 at 05/03/2022 12:41 Reason:   Spe with sabino stanton Patient unavailable going to ct  Collection has been rescheduled by TH6 at 05/03/2022 14:03 Reason:   Patient unavailable in ultrasound sabino valenzuela said come back in an hr   Collection has been rescheduled by TH6 at 05/03/2022 12:38 Reason:   Patient unavailable going yo ct will yry back  Collection has been rescheduled by 6 at 05/03/2022 12:41 Reason:   Spe with sabino stanton Patient unavailable going to ct  Collection has been rescheduled by 6 at 05/03/2022 14:03 Reason:   Patient unavailable in ultrasound sabino valenzuela said come back in an hr     Blood culture [[id number]] Collected: 05/03/22 1543    Order Status: Completed Specimen: Blood from Peripheral, Right Hand Updated: 05/04/22 1612     Blood Culture, Routine No Growth to date      No Growth to date    Narrative:      Collection has been rescheduled by 6 at 05/03/2022 12:38 Reason:   Patient unavailable going yo ct will yry back  Collection has been rescheduled by 6 at 05/03/2022 12:41 Reason:   Spe with sabino stanton Patient unavailable going to ct  Collection has been rescheduled by 6 at 05/03/2022 14:03 Reason:   Patient unavailable in ultrasound sabino valenzuela said come back in an hr   Collection has been rescheduled by 6 at 05/03/2022 12:38 Reason:   Patient unavailable going yo ct will yry back  Collection has been rescheduled by 6 at 05/03/2022 12:41 Reason:   Spe with sabino stanton Patient unavailable going to ct  Collection has been rescheduled by 6 at 05/03/2022 14:03 Reason:   Patient unavailable in ultrasound sabino valenzuela said come back in an hr     Culture, Respiratory  with Gram Stain [[id number]]     Order Status: No result Specimen: Respiratory     Blood culture [[id number]] Collected: 04/28/22 1201    Order Status: Completed Specimen: Blood from Peripheral, Forearm, Right Updated: 05/02/22 1303     Blood Culture, Routine No Growth after 4 days.     Blood culture [[id number]] Collected: 04/28/22 1201    Order Status: Completed Specimen: Blood from Peripheral, Hand, Left Updated: 05/02/22 1303     Blood Culture, Routine No Growth after 4 days.           All pertinent labs from the last 24 hours have been reviewed.    Significant Diagnostics:  I have reviewed all pertinent imaging results/findings within the past 24 hours.    Assessment/Plan:     * Epidural hematoma  82 y/o male with APS on coumadin, hx PE, MAC pneumonia, HLD with spontaneous C3-C6 epidural hematoma compressing the spinal cord    S/p  C3-C6 laminectomy and fusion for evacuation of epidural hematoma 4/28    Stepped down to NPU    -- CTH 4/29 no acute intracranial process   -- Post op MRI C spine satisfactory decompression with continued cord signal edema   --postop XR C spine hardware in good position  --Hypotension: BP stable on 5/5 s/p IVF. Appreciate  assistance.    --Continue Zosyn for empiric coverage of gram neg and anaerobes.   --Reglan discontinued  -- Acute encephalopathy:    --CTH 5/3 ordered per medicine team for some confusion: without evidence of acute hemorrhage.    --Hypernatremia: Na 148 today, continue to monitor daily   --BLE US wo DVT, UA neg, CXR negative for acute processes   --Telesitter discontinued. Patient no longer in restraints.  -- Bilateral hydroureteronephrosis: Catheter in place. Urology following.   -- C-collar to be worn when OOB  -- HV drains removed 4/30  -- MAP goals completed in ICU  -- History of antiphospholipid syndrome and PE: hold warfarin. Heme/onc following.   -- SLP recommending regular diet with nectar thick liquids. MBSS today to assess if patient requires continued  thickened liquids  -- Dex taper to off.   -- Constipation: Bowel movement s/p enema on 5/3  -- Activity: PT/OT recs for rehab  -- DVT prophylaxis: TEDs/SCDs/SQH  --Concern for hematoma vs abscess. Final path returned as blood clot. Daptomycin discontinued per ID recs.     Dispo: Pending rehab, referrals sent. Patient requiring physician care due to bilateral hydroureteronephrosis.         Isabel Barrientos PA-C  Neurosurgery  [facility] - Neurosurgery ([facility])

## 2022-05-05 NOTE — SUBJECTIVE & OBJECTIVE
Interval History 5/5/2022:  Patient is seen for follow-up PM&R evaluation and recommendations: Participated w/ therapy today and doing well.     HPI, Past Medical, Family, and Social History remains the same as documented in the initial encounter.    Scheduled Medications:    acetaminophen  1,000 mg Oral TID    albuterol-ipratropium  3 mL Nebulization Q6H    azithromycin  500 mg Oral Daily    dexAMETHasone  4 mg Oral Q12H    Followed by    [START ON 5/6/2022] dexAMETHasone  2 mg Oral Q6H    Followed by    [START ON 5/9/2022] dexAMETHasone  2 mg Oral Q8H    Followed by    [START ON 5/10/2022] dexAMETHasone  2 mg Oral Q12H    Followed by    [START ON 5/13/2022] dexAMETHasone  2 mg Oral Daily    ethambutoL  800 mg Oral Daily    famotidine  20 mg Oral BID    heparin (porcine)  5,000 Units Subcutaneous Q8H    LIDOcaine  1 patch Transdermal Q24H    morphine  2 mg Intravenous Once    piperacillin-tazobactam (ZOSYN) IVPB  4.5 g Intravenous Q8H    polyethylene glycol  17 g Oral Daily    pravastatin  10 mg Oral Daily    QUEtiapine  25 mg Oral QHS    senna  8.6 mg Oral BID    silodosin  4 mg Oral Daily    sodium chloride 3%  4 mL Nebulization BID       Diagnostic Results: Labs: Reviewed  ECG: Reviewed  CT: Reviewed    PRN Medications: acetaminophen, dextrose 10%, dextrose 10%, glucagon (human recombinant), insulin aspart U-100, lactulose, melatonin, ondansetron, oxyCODONE    Review of Systems   Constitutional:  Positive for activity change.   Gastrointestinal:  Negative for constipation and diarrhea.   Musculoskeletal:  Positive for gait problem.   Neurological:  Positive for weakness.   Psychiatric/Behavioral:  Positive for behavioral problems and confusion.    Objective:     Vital Signs (Most Recent):  Temp: 97.6 °F (36.4 °C) (05/05/22 1141)  Pulse: 62 (05/05/22 1141)  Resp: 19 (05/05/22 1141)  BP: (!) 125/58 (05/05/22 1141)  SpO2: 98 % (05/05/22 1141)      Vital Signs (24h Range):  Temp:  [96.2 °F (35.7 °C)-98.6 °F (37 °C)]  97.6 °F (36.4 °C)  Pulse:  [] 62  Resp:  [16-22] 19  SpO2:  [90 %-98 %] 98 %  BP: ()/(50-59) 125/58     Physical Exam  Vitals and nursing note reviewed.   Constitutional:       Appearance: He is well-developed.   HENT:      Head: Normocephalic and atraumatic.   Eyes:      General:         Right eye: No discharge.         Left eye: No discharge.      Pupils: Pupils are equal, round, and reactive to light.   Pulmonary:      Effort: Pulmonary effort is normal. No respiratory distress.   Abdominal:      General: There is no distension.      Palpations: Abdomen is soft.      Tenderness: There is no abdominal tenderness.   Musculoskeletal:         General: No deformity.      Cervical back: Neck supple.      Comments: BLE weakness present   Skin:     General: Skin is warm and dry.   Neurological:      Mental Status: He is alert.      Sensory: No sensory deficit.      Motor: Weakness present. No abnormal muscle tone.      Comments: - confusion present- improving   Psychiatric:         Behavior: Behavior normal.      Comments:   - camera sitter     NEUROLOGICAL EXAMINATION:     CRANIAL NERVES     CN III, IV, VI   Pupils are equal, round, and reactive to light.

## 2022-05-05 NOTE — CONSULTS
[facility] - Neurosurgery ([facility])  Urology  Consult Note    Patient Name: Diego Gunter  MRN: [id number]  Admission Date: 4/28/2022  Hospital Length of Stay: 7   Code Status: Full Code   Attending Provider: Carlos Miller MD   Consulting Provider: Jose De Dios MD  Primary Care Physician: Portia Ferreira MD  Principal Problem:Epidural hematoma    Inpatient consult to Urology  Consult performed by: Jose De Dios MD  Consult ordered by: Kajal Cannon MD  Reason for consult: B/l hydro. Urinary retention          Subjective:     HPI:  Diego Gunter is a 82 yo M with PMH antiphospholipid syndrome on chronic coumadin for left PE, DM2, prostate ca s/p prostatectomy 2011, MAC pneumonia dx Fall 2018, mild cognitive impairment, and HLD who was transferred from OSH for compressive epidural hematoma in the cervical spine. Urology consulted for b/l hydro. During this admission, he has had multiple catheter placements with high volume UOP. When speaking to him, he was hard to understand upon questioning. He has not had any urinary issues at home. No dysuria or hematuria. He does not have a history of retention.    CT A/P yesterday showed b/l moderate hydronephrosis and a largely distended bladder. Floyd catheter placed last night with 2.3L of clear urine output. Floyd in place this am. During this admission he has a history of high urine output. There are concerns for polyuria due to head trauma at this time. Cr 0.9, Hgb 10.7, WBC 17.8. UA negative for blood, leuks, nitrite.      Past Medical History:   Diagnosis Date    Antiphospholipid syndrome 11/19/2021    Dysphagia     Hx of colonic polyps     followed by GI. Dr. Pham    Hyperlipidemia LDL goal <100     Overweight(278.02)     Prostate cancer september 2011    followed by urology, Dr. Rogers    Type II or unspecified type diabetes mellitus without mention of complication, not stated as uncontrolled     diet controlled       Past Surgical History:    Procedure Laterality Date    BRONCHOSCOPY N/A 10/15/2018    Procedure: BRONCHOSCOPY;  Surgeon: Pratibha Diagnostic Provider;  [facility];  Service: Anesthesiology;  Laterality: N/A;    CATARACT EXTRACTION, BILATERAL      POSTERIOR CERVICAL LAMINECTOMY Bilateral 2022    Procedure: LAMINECTOMY, SPINE, CERVICAL, POSTERIOR APPROACH C3-6;  Surgeon: Carlos Miller MD;  Location: [facility] OR [address] [place] [address];  Service: Neurosurgery;  Laterality: Bilateral;    PROSTATECTOMY         Review of patient's allergies indicates:  No Known Allergies    Family History       Problem Relation (Age of Onset)    Colon cancer Mother    Dementia Brother    Diabetes Mother, Sister, , Brother, Brother, Maternal Aunt    Heart disease Father    Lung cancer Brother    Mental illness Sister    Myasthenia gravis Brother    Other Brother    Parkinsonism Brother    Pulmonary fibrosis Brother            Tobacco Use    Smoking status: Former Smoker     Packs/day: 0.25     Years: 5.00     Pack years: 1.25     Quit date: 10/2/1962     Years since quittin.6    Smokeless tobacco: Never Used    Tobacco comment: quit age 23   Substance and Sexual Activity    Alcohol use: Yes     Comment: beer occasionally    Drug use: No    Sexual activity: Yes     Partners: Female       Review of Systems    Objective:     Temp:  [96.2 °F (35.7 °C)-98.6 °F (37 °C)] 96.9 °F (36.1 °C)  Pulse:  [] 58  Resp:  [16-22] 16  SpO2:  [90 %-97 %] 94 %  BP: ()/(50-59) 117/58     Body mass index is 24.37 kg/m².      Bladder Scan Volume (mL): 999 mL (22)  Post Void Cath Residual Output (mL): 2300 mL (22 0224)    Drains       Drain  Duration                  Urethral Catheter 22 0200 Straight-tip;Non-latex <1 day                    Physical Exam  Eyes:      Pupils: Pupils are equal, round, and reactive to light.   Neck:      Comments: C-collar on currently  Cardiovascular:      Rate and Rhythm: Normal rate.      Pulses: Normal  pulses.   Chest:      Chest wall: No tenderness.   Abdominal:      General: There is no distension.   Genitourinary:     Comments: Amaya in place draining clear urine  Musculoskeletal:         General: Normal range of motion.   Skin:     General: Skin is warm.   Neurological:      General: No focal deficit present.      Mental Status: He is oriented to person, place, and time.   Psychiatric:         Mood and Affect: Mood normal.         Behavior: Behavior normal.       Significant Labs:    BMP:  Recent Labs   Lab 05/03/22  0821 05/03/22  1543 05/04/22  0755   * 143 144   K 3.6 3.6 3.7    107 106   CO2 27 26 29   BUN 17 15 18   CREATININE 0.8 0.8 0.9   CALCIUM 8.9 9.1 8.3*       CBC:  Recent Labs   Lab 05/02/22  0053 05/03/22  0821 05/04/22  0755   WBC 13.19* 19.62* 17.81*   HGB 9.7* 10.9* 10.7*   HCT 29.5* 32.8* 35.6*    236 217       All pertinent labs results from the past 24 hours have been reviewed.    Significant Imaging:  CT: I have reviewed all results within the past 24 hours and my personal findings are:  All findings in HPI                      Assessment and Plan:     Urinary retention  Diego Gunter is a 80 yo M with PMH antiphospholipid syndrome on chronic coumadin for left PE, DM2, prostate ca s/p prostatectomy 2011, MAC pneumonia dx Fall 2018, mild cognitive impairment, and HLD who was transferred from OSH for compressive epidural hematoma in the cervical spine. Urology consulted for b/l hydro    -Polyuria while inpatient likely due to neurologic cause. Recommend keeping amaya in place to reduce bladder distention. 2.3 L drained last night upon insertion. He likely has myogenic currently and needs decompression to reach baseline voiding state  -Monitor electrolyte abnormalities due to polyuria. Recommend allowing patient to drink to thirst if able, if not, recommend aggressive repletion with mivf.   -Will set up a voiding trial and renal US in 2 weeks        VTE Risk Mitigation  (From admission, onward)         Ordered     heparin (porcine) injection 5,000 Units  Every 8 hours         04/29/22 1704     Reason for No Pharmacological VTE Prophylaxis  Once        Question:  Reasons:  Answer:  Risk of Bleeding    04/28/22 2058     IP VTE HIGH RISK PATIENT  Once         04/28/22 2058     Place sequential compression device  Until discontinued         04/28/22 2058                Thank you for your consult.     Jose De Dios MD  Urology  [facility] - Neurosurgery ([facility])

## 2022-05-05 NOTE — PROGRESS NOTES
"[facility] - Neurosurgery ([facility] Medicine  Progress Note    Patient Name: Diego Gunter  MRN: [id number]  Patient Class: IP- Inpatient   Admission Date: 4/28/2022  Length of Stay: 7 days  Attending Physician: Carlos Miller MD  Primary Care Provider: Portia Ferreira MD        Subjective:     Principal Problem:Epidural hematoma        HPI:  Mr. Gunter is a 81 year-old male with antiphospholipid syndrome (diagnosed 11/2021, on warfarin), PE, HLD, MAC (diagnosed in 2018, on chronic ethambutol and azithromycin), previous prostate ca (diagnosed in 2011, s/p prostatectomy) who presented to the hospital on 04/28 with R-sided weakness. Stroke workup was negative, but imaging was consistent with a compressive epidural hematoma ni the cervical spine C3-C6. He underwent spinal decompression and fluid evacuation with NSGY on 04/28. Since admitted, the Hematology/Oncology service was consulted- recommended not resuming therapeutic anticoagulation for APLS given current bleeding event and starting ppx anticoagulation when deemed safe by nsgy. ID was also consulted for recommendations regarding whether the epidural fluid collection could be an infectious collection- recommended starting dapto until path returned. Hospital course has been complicated by shock with intermittent pressor requirements (off since 05/01) and waxing/waning encephalopathy. He was noted to have acute changes in mental status around 05/01 which prompted a workup- EEG was obtained without epileptiform changes but with evidence of diffuse background slowing. Medicine consulted for "medical management; APS previously on coumadin; post op delirium".      Overview/Hospital Course:  No notes on file    Interval History: NAEON. Pt HD stable after IVF yesterday. Urology consult for bilateral hydroureteronephrosis. Silodosin cont and urinary catheter placed.     Review of Systems   Constitutional:  Positive for fatigue. Negative for chills and fever. "   HENT:  Negative for congestion and sore throat.    Respiratory:  Negative for cough and shortness of breath.    Cardiovascular:  Negative for chest pain, palpitations and leg swelling.   Gastrointestinal:  Positive for constipation. Negative for abdominal distention, abdominal pain, diarrhea, nausea and vomiting.   Genitourinary:  Negative for dysuria and frequency.   Musculoskeletal:  Negative for back pain and myalgias.   Skin:  Negative for color change and rash.   Neurological:  Negative for light-headedness and headaches.   Psychiatric/Behavioral:  Negative for confusion and hallucinations.    Objective:     Vital Signs (Most Recent):  Temp: 98.6 °F (37 °C) (05/05/22 0720)  Pulse: 65 (05/05/22 0840)  Resp: 18 (05/05/22 0720)  BP: (!) 122/59 (05/05/22 0720)  SpO2: 97 % (05/05/22 0720)   Vital Signs (24h Range):  Temp:  [96.2 °F (35.7 °C)-98.6 °F (37 °C)] 98.6 °F (37 °C)  Pulse:  [] 65  Resp:  [16-22] 18  SpO2:  [90 %-97 %] 97 %  BP: ()/(50-59) 122/59     Weight: 74.8 kg (165 lb)  Body mass index is 24.37 kg/m².    Intake/Output Summary (Last 24 hours) at 5/5/2022 0843  Last data filed at 5/5/2022 0601  Gross per 24 hour   Intake 1200 ml   Output 7100 ml   Net -5900 ml      Physical Exam  Constitutional:       General: He is not in acute distress.     Appearance: He is not ill-appearing or toxic-appearing.   HENT:      Head: Normocephalic and atraumatic.      Nose: No congestion or rhinorrhea.      Mouth/Throat:      Pharynx: No oropharyngeal exudate or posterior oropharyngeal erythema.   Eyes:      General: No scleral icterus.     Conjunctiva/sclera: Conjunctivae normal.   Cardiovascular:      Rate and Rhythm: Normal rate and regular rhythm.      Pulses: Normal pulses.      Heart sounds: Normal heart sounds.   Pulmonary:      Effort: Pulmonary effort is normal.      Breath sounds: Normal breath sounds.   Abdominal:      General: Abdomen is flat. Bowel sounds are normal. There is no distension.       Palpations: Abdomen is soft.      Tenderness: There is no abdominal tenderness. There is no guarding or rebound.   Musculoskeletal:         General: No swelling.      Right lower leg: No edema.      Left lower leg: No edema.   Skin:     General: Skin is warm.      Coloration: Skin is not jaundiced.   Neurological:      General: No focal deficit present.      Mental Status: He is alert.      Comments: Oriented to person, intermittently to time and place  Follows commands appropriately  Strength 4/5 throughout  Sensation intact throughout   Psychiatric:         Mood and Affect: Mood normal.         Behavior: Behavior normal.       Significant Labs: All pertinent labs within the past 24 hours have been reviewed.    Significant Imaging: I have reviewed all pertinent imaging results/findings within the past 24 hours.      Assessment/Plan:      * Epidural hematoma  --continue management per primary      Hypernatremia  D5W 100cc/hr  Na q12h        Impaired mobility        Acute encephalopathy  Mr. Gunter is a 81 year-old male with antiphospholipid syndrome (diagnosed 11/2021, on warfarin), PE, HLD, MAC (diagnosed in 2018, on chronic ethambutol and azithromycin), previous prostate ca (diagnosed in 2011, s/p prostatectomy) who presented on 04/28 with an epidural hematoma in C3-C6. He is s/p spinal decompression and fluid evacuation with NSGY on 04/28. Course has been complicated by shock with intermittent pressor requirements (off pressors since 05/01) and waxing/waning encephalopathy, for which medicine was consulted for.     Suspect encephalopathy is multifactorial.   Contributing factors include delirium vs medications (was on scheduled methocarbamol, pregabalin, and steroids) vs electrolyte imbalances (hypernatremic) vs infection (new leukocytosis).    As part of the workup, he has undergone video EEG monitoring from 05/01-05/02 without epileptiform changes. Additionally underwent a CTH 05/03 without acute changes.      Still suspect waxing/waning mentation and hallucinations are related to delirium. Now improving from yesterday.    --continue multimodal pain regimen and avoid sedatives as allowed  --follow infectious workup  --cont zosyn  --continue daily miralax and senna, PRN lactulose  --urinary retention with bilateral hydroureteronephrosis, urology following. Catheter in place.    Urinary retention        Intermittent confusion        Acute thoracic back pain  -patient seen by vascular Surgical input appreciated.  No recommendation for intervention  -CTA chest/abdomen/pelvis showed no dissection, no PE, questionable changes C3-C6 epidural process.    -MRI brain and C-spine ordered       Acute ischemic left MCA stroke  Patient has acute metabolic encephalopathy that is secondary to Cerebral ischemia. Patient's current mental status is Oriented person. Patient's baseline mental status is. awake and alert; oriented to person, place, and time Evaluation and for underlying cause(s) is underway and inclusive of Neuro-Imaging (MRI/CT). Will monitor neuro checks carefully, avoid narcotics and benzos that will exacerbate agitation, and use PRN medications for controls of behavior for self harm.   -CTA head and neck, no aneurysm or stenosis  -CTA chest/abdomen/pelvis, no PE, no dissection, questionable changes C3-C6 with need to rule out epidural process.  -MRI brain and C-spine ordered  -neurology consultation  -patient on Coumadin, INR 3.1  -added aspirin, statin  -CVA/TIA order set  -echo ordered  -neuro checks      Antiphospholipid syndrome        Hypercoagulable state        History of pulmonary embolism  --hold therapeutic anticoagulation at this time, per Heme/Onc recs, given recent bleed    Cognitive communication deficit        Mild cognitive impairment        HILLARY (mycobacterium avium-intracellulare)  --continue ethambutol and azithromycin      Cavitary lung disease        Hyperlipidemia associated with type 2 diabetes  mellitus  --continue statin      Prediabetes        History of prostate cancer        VTE Risk Mitigation (From admission, onward)         Ordered     heparin (porcine) injection 5,000 Units  Every 8 hours         04/29/22 1704     Reason for No Pharmacological VTE Prophylaxis  Once        Question:  Reasons:  Answer:  Risk of Bleeding    04/28/22 2058     IP VTE HIGH RISK PATIENT  Once         04/28/22 2058     Place sequential compression device  Until discontinued         04/28/22 2058                Discharge Planning   MALIA: 5/6/2022     Code Status: Full Code   Is the patient medically ready for discharge?: No    Reason for patient still in hospital (select all that apply): Pending disposition  Discharge Plan A: Rehab                  Marilee Mcginnis (Sweet Name: Daniel), MD  Department of Hospital Medicine   [facility] - Neurosurgery ([facility])

## 2022-05-05 NOTE — SUBJECTIVE & OBJECTIVE
Past Medical History:   Diagnosis Date    Antiphospholipid syndrome 2021    Dysphagia     Hx of colonic polyps     followed by GI. Dr. Pham    Hyperlipidemia LDL goal <100     Overweight(278.02)     Prostate cancer 2011    followed by urology, Dr. Rogers    Type II or unspecified type diabetes mellitus without mention of complication, not stated as uncontrolled     diet controlled       Past Surgical History:   Procedure Laterality Date    BRONCHOSCOPY N/A 10/15/2018    Procedure: BRONCHOSCOPY;  Surgeon: Pratibha Diagnostic Provider;  Location: [facility] OR [address] [place] [address];  Service: Anesthesiology;  Laterality: N/A;    CATARACT EXTRACTION, BILATERAL      POSTERIOR CERVICAL LAMINECTOMY Bilateral 2022    Procedure: LAMINECTOMY, SPINE, CERVICAL, POSTERIOR APPROACH C3-6;  Surgeon: Carlos Miller MD;  Location: [facility] OR [address] [place] [address];  Service: Neurosurgery;  Laterality: Bilateral;    PROSTATECTOMY         Review of patient's allergies indicates:  No Known Allergies    Family History       Problem Relation (Age of Onset)    Colon cancer Mother    Dementia Brother    Diabetes Mother, Sister, , Brother, Brother, Maternal Aunt    Heart disease Father    Lung cancer Brother    Mental illness Sister    Myasthenia gravis Brother    Other Brother    Parkinsonism Brother    Pulmonary fibrosis Brother            Tobacco Use    Smoking status: Former Smoker     Packs/day: 0.25     Years: 5.00     Pack years: 1.25     Quit date: 10/2/1962     Years since quittin.6    Smokeless tobacco: Never Used    Tobacco comment: quit age 23   Substance and Sexual Activity    Alcohol use: Yes     Comment: beer occasionally    Drug use: No    Sexual activity: Yes     Partners: Female       Review of Systems    Objective:     Temp:  [96.2 °F (35.7 °C)-98.6 °F (37 °C)] 96.9 °F (36.1 °C)  Pulse:  [] 58  Resp:  [16-22] 16  SpO2:  [90 %-97 %] 94 %  BP: ()/(50-59) 117/58     Body mass index is 24.37 kg/m².      Bladder Scan  Volume (mL): 999 mL (05/05/22 0224)  Post Void Cath Residual Output (mL): 2300 mL (05/05/22 0224)    Drains       Drain  Duration                  Urethral Catheter 05/05/22 0200 Straight-tip;Non-latex <1 day                    Physical Exam  Eyes:      Pupils: Pupils are equal, round, and reactive to light.   Neck:      Comments: C-collar on currently  Cardiovascular:      Rate and Rhythm: Normal rate.      Pulses: Normal pulses.   Chest:      Chest wall: No tenderness.   Abdominal:      General: There is no distension.   Genitourinary:     Comments: Floyd in place draining clear urine  Musculoskeletal:         General: Normal range of motion.   Skin:     General: Skin is warm.   Neurological:      General: No focal deficit present.      Mental Status: He is oriented to person, place, and time.   Psychiatric:         Mood and Affect: Mood normal.         Behavior: Behavior normal.       Significant Labs:    BMP:  Recent Labs   Lab 05/03/22  0821 05/03/22  1543 05/04/22  0755   * 143 144   K 3.6 3.6 3.7    107 106   CO2 27 26 29   BUN 17 15 18   CREATININE 0.8 0.8 0.9   CALCIUM 8.9 9.1 8.3*       CBC:  Recent Labs   Lab 05/02/22  0053 05/03/22  0821 05/04/22  0755   WBC 13.19* 19.62* 17.81*   HGB 9.7* 10.9* 10.7*   HCT 29.5* 32.8* 35.6*    236 217       All pertinent labs results from the past 24 hours have been reviewed.    Significant Imaging:  CT: I have reviewed all results within the past 24 hours and my personal findings are:  All findings in HPI

## 2022-05-05 NOTE — ASSESSMENT & PLAN NOTE
"--continue multimodal pain regimen and avoid sedatives as allowed  --follow infectious workup - NGTD  --cont cefepime/metronidazole   ID consulted, appreciate recs  --continue daily miralax and senna, PRN lactulose  --urinary retention with bilateral hydroureteronephrosis, urology following. Catheter in place.  -- d/c seroquel  -- Per family at bedside, patient is more talkative than baseline and patient reports hallucinations of "going to [place]" today. Concern for hyperactive delirium.  "

## 2022-05-05 NOTE — ASSESSMENT & PLAN NOTE
80 y/o male with APS on coumadin, hx PE, MAC pneumonia, HLD with spontaneous C3-C6 epidural hematoma compressing the spinal cord    S/p  C3-C6 laminectomy and fusion for evacuation of epidural hematoma 4/28    Stepped down to NPU    -- CTH 4/29 no acute intracranial process   -- Post op MRI C spine satisfactory decompression with continued cord signal edema   --postop XR C spine hardware in good position  --Hypotension: BP stable on 5/5 s/p IVF. Appreciate HM assistance.    --Continue Zosyn for empiric coverage of gram neg and anaerobes.   --Reglan discontinued  -- Acute encephalopathy:    --CTH 5/3 ordered per medicine team for some confusion: without evidence of acute hemorrhage.    --Hypernatremia: Na 148 today, continue to monitor daily   --BLE US wo DVT, UA neg, CXR negative for acute processes   --Telesitter discontinued. Patient no longer in restraints.  -- Bilateral hydroureteronephrosis: Catheter in place. Urology following.   -- C-collar to be worn when OOB  -- HV drains removed 4/30  -- MAP goals completed in ICU  -- History of antiphospholipid syndrome and PE: hold warfarin. Heme/onc following.   -- SLP recommending regular diet with nectar thick liquids. MBSS today to assess if patient requires continued thickened liquids  -- Dex taper to off.   -- Constipation: Bowel movement s/p enema on 5/3  -- Activity: PT/OT recs for rehab  -- DVT prophylaxis: TEDs/SCDs/SQH  --Concern for hematoma vs abscess. Final path returned as blood clot. Daptomycin discontinued per ID recs.     Dispo: Pending rehab, referrals sent. Patient requiring physician care due to bilateral hydroureteronephrosis.

## 2022-05-05 NOTE — NURSING
Spoke with Dr. Reynolds about patient's 2300 ml of urine output once placing the amaya. Abdominal distention has decreased remarkably. Dr. Reynolds said to document void amount and clamp amaya. No new orders at this time.

## 2022-05-05 NOTE — NURSING
LR bolus ordered at 2000 mL/hr. Alaris IV pump can only pump at rate of 999mL/hr, and RN was unsuccessful at obtaining a 20 G IV. Two 22 G IV's were placed, but in order to avoid blowing them, the LR bolus was given at a rate of 500 mL/hr with verbal ok from Dr. Preciado.

## 2022-05-05 NOTE — ASSESSMENT & PLAN NOTE
Diego Gunter is a 80 yo M with PMH antiphospholipid syndrome on chronic coumadin for left PE, DM2, prostate ca s/p prostatectomy 2011, MAC pneumonia dx Fall 2018, mild cognitive impairment, and HLD who was transferred from OSH for compressive epidural hematoma in the cervical spine. Urology consulted for b/l hydro    -Polyuria while inpatient likely due to neurologic cause. Recommend keeping amaya in place to reduce bladder distention. 2.3 L drained last night upon insertion. He likely has myogenic currently and needs decompression to reach baseline voiding state  -Monitor post-obstructive diuresis. Recommend allowing patient to drink to thirst if able, if not, recommend aggressive repletion with mivf.   -Monitor CMP for any electrolyte abnormalities and correct prn  -Will set up a voiding trial in 2 weeks to remove amaya catheter

## 2022-05-06 ENCOUNTER — TELEPHONE (OUTPATIENT)
Dept: UROLOGY | Facility: CLINIC | Age: 82
End: 2022-05-06
Payer: MEDICARE

## 2022-05-06 LAB
ALBUMIN SERPL BCP-MCNC: 3.2 G/DL (ref 3.5–5.2)
ALP SERPL-CCNC: 57 U/L (ref 55–135)
ALT SERPL W/O P-5'-P-CCNC: 43 U/L (ref 10–44)
ANION GAP SERPL CALC-SCNC: 4 MMOL/L (ref 8–16)
APTT BLDCRRT: 35.5 SEC (ref 21–32)
APTT IMM NP PPP: 47 SEC (ref 32–48)
APTT P HEP NEUT PPP: ABNORMAL SEC (ref 32–48)
AST SERPL-CCNC: 29 U/L (ref 10–40)
BASOPHILS # BLD AUTO: 0.03 K/UL (ref 0–0.2)
BASOPHILS NFR BLD: 0.2 % (ref 0–1.9)
BILIRUB SERPL-MCNC: 0.6 MG/DL (ref 0.1–1)
BUN SERPL-MCNC: 11 MG/DL (ref 8–23)
CALCIUM SERPL-MCNC: 8.9 MG/DL (ref 8.7–10.5)
CHLORIDE SERPL-SCNC: 110 MMOL/L (ref 95–110)
CO2 SERPL-SCNC: 29 MMOL/L (ref 23–29)
CONFIRM APTT STACLOT: ABNORMAL
CREAT SERPL-MCNC: 0.7 MG/DL (ref 0.5–1.4)
DIFFERENTIAL METHOD: ABNORMAL
DRVVT SCREEN TO CONFIRM RATIO: ABNORMAL RATIO
EOSINOPHIL # BLD AUTO: 0 K/UL (ref 0–0.5)
EOSINOPHIL NFR BLD: 0.1 % (ref 0–8)
ERYTHROCYTE [DISTWIDTH] IN BLOOD BY AUTOMATED COUNT: 15.8 % (ref 11.5–14.5)
EST. GFR  (AFRICAN AMERICAN): >60 ML/MIN/1.73 M^2
EST. GFR  (NON AFRICAN AMERICAN): >60 ML/MIN/1.73 M^2
GLUCOSE SERPL-MCNC: 122 MG/DL (ref 70–110)
HCT VFR BLD AUTO: 33.6 % (ref 40–54)
HGB BLD-MCNC: 10.4 G/DL (ref 14–18)
IMM GRANULOCYTES # BLD AUTO: 0.36 K/UL (ref 0–0.04)
IMM GRANULOCYTES NFR BLD AUTO: 2.6 % (ref 0–0.5)
INR PPP: 1.1 (ref 0.8–1.2)
LA 3 SCREEN W REFLEX-IMP: ABNORMAL
LA NT DPL PPP QL: ABNORMAL
LYMPHOCYTES # BLD AUTO: 2.1 K/UL (ref 1–4.8)
LYMPHOCYTES NFR BLD: 15 % (ref 18–48)
MAGNESIUM SERPL-MCNC: 1.9 MG/DL (ref 1.6–2.6)
MCH RBC QN AUTO: 28 PG (ref 27–31)
MCHC RBC AUTO-ENTMCNC: 31 G/DL (ref 32–36)
MCV RBC AUTO: 91 FL (ref 82–98)
MIXING DRVVT: ABNORMAL SEC (ref 33–44)
MONOCYTES # BLD AUTO: 1.2 K/UL (ref 0.3–1)
MONOCYTES NFR BLD: 8.7 % (ref 4–15)
NEUTROPHILS # BLD AUTO: 10.2 K/UL (ref 1.8–7.7)
NEUTROPHILS NFR BLD: 73.4 % (ref 38–73)
NRBC BLD-RTO: 0 /100 WBC
PHOSPHATE SERPL-MCNC: 2.7 MG/DL (ref 2.7–4.5)
PLATELET # BLD AUTO: 231 K/UL (ref 150–450)
PMV BLD AUTO: 10.8 FL (ref 9.2–12.9)
POCT GLUCOSE: 134 MG/DL (ref 70–110)
POCT GLUCOSE: 144 MG/DL (ref 70–110)
POCT GLUCOSE: 158 MG/DL (ref 70–110)
POCT GLUCOSE: 198 MG/DL (ref 70–110)
POTASSIUM SERPL-SCNC: 3.6 MMOL/L (ref 3.5–5.1)
PROT SERPL-MCNC: 6.2 G/DL (ref 6–8.4)
PROTHROMBIN TIME: 11.1 SEC (ref 9–12.5)
PROTHROMBIN TIME: 15.4 SEC (ref 12–15.5)
RBC # BLD AUTO: 3.71 M/UL (ref 4.6–6.2)
REPTILASE TIME: ABNORMAL SEC
SCREEN APTT: 56 SEC (ref 32–48)
SCREEN DRVVT: 36 SEC (ref 33–44)
SODIUM SERPL-SCNC: 143 MMOL/L (ref 136–145)
SODIUM SERPL-SCNC: 143 MMOL/L (ref 136–145)
SODIUM SERPL-SCNC: 145 MMOL/L (ref 136–145)
THROMBIN TIME: 18.7 SEC (ref 14.7–19.5)
WBC # BLD AUTO: 13.87 K/UL (ref 3.9–12.7)

## 2022-05-06 PROCEDURE — 25000003 PHARM REV CODE 250

## 2022-05-06 PROCEDURE — 93010 EKG 12-LEAD: ICD-10-PCS | Mod: ,,, | Performed by: INTERNAL MEDICINE

## 2022-05-06 PROCEDURE — 99024 PR POST-OP FOLLOW-UP VISIT: ICD-10-PCS | Mod: ,,, | Performed by: PHYSICIAN ASSISTANT

## 2022-05-06 PROCEDURE — 63600175 PHARM REV CODE 636 W HCPCS: Performed by: PHYSICIAN ASSISTANT

## 2022-05-06 PROCEDURE — 99024 POSTOP FOLLOW-UP VISIT: CPT | Mod: ,,, | Performed by: PHYSICIAN ASSISTANT

## 2022-05-06 PROCEDURE — 99233 SBSQ HOSP IP/OBS HIGH 50: CPT | Mod: GC,,, | Performed by: HOSPITALIST

## 2022-05-06 PROCEDURE — 11000001 HC ACUTE MED/SURG PRIVATE ROOM

## 2022-05-06 PROCEDURE — 63600175 PHARM REV CODE 636 W HCPCS: Performed by: STUDENT IN AN ORGANIZED HEALTH CARE EDUCATION/TRAINING PROGRAM

## 2022-05-06 PROCEDURE — 25000003 PHARM REV CODE 250: Performed by: STUDENT IN AN ORGANIZED HEALTH CARE EDUCATION/TRAINING PROGRAM

## 2022-05-06 PROCEDURE — 93010 ELECTROCARDIOGRAM REPORT: CPT | Mod: ,,, | Performed by: INTERNAL MEDICINE

## 2022-05-06 PROCEDURE — 94761 N-INVAS EAR/PLS OXIMETRY MLT: CPT

## 2022-05-06 PROCEDURE — 99900035 HC TECH TIME PER 15 MIN (STAT)

## 2022-05-06 PROCEDURE — 25000003 PHARM REV CODE 250: Performed by: PHYSICIAN ASSISTANT

## 2022-05-06 PROCEDURE — 80053 COMPREHEN METABOLIC PANEL: CPT | Performed by: STUDENT IN AN ORGANIZED HEALTH CARE EDUCATION/TRAINING PROGRAM

## 2022-05-06 PROCEDURE — 25000242 PHARM REV CODE 250 ALT 637 W/ HCPCS: Performed by: STUDENT IN AN ORGANIZED HEALTH CARE EDUCATION/TRAINING PROGRAM

## 2022-05-06 PROCEDURE — 99223 1ST HOSP IP/OBS HIGH 75: CPT | Mod: GC,,, | Performed by: STUDENT IN AN ORGANIZED HEALTH CARE EDUCATION/TRAINING PROGRAM

## 2022-05-06 PROCEDURE — 84100 ASSAY OF PHOSPHORUS: CPT | Performed by: STUDENT IN AN ORGANIZED HEALTH CARE EDUCATION/TRAINING PROGRAM

## 2022-05-06 PROCEDURE — 97535 SELF CARE MNGMENT TRAINING: CPT

## 2022-05-06 PROCEDURE — 92526 ORAL FUNCTION THERAPY: CPT

## 2022-05-06 PROCEDURE — 85610 PROTHROMBIN TIME: CPT | Performed by: STUDENT IN AN ORGANIZED HEALTH CARE EDUCATION/TRAINING PROGRAM

## 2022-05-06 PROCEDURE — 85025 COMPLETE CBC W/AUTO DIFF WBC: CPT | Performed by: STUDENT IN AN ORGANIZED HEALTH CARE EDUCATION/TRAINING PROGRAM

## 2022-05-06 PROCEDURE — 99223 PR INITIAL HOSPITAL CARE,LEVL III: ICD-10-PCS | Mod: GC,,, | Performed by: STUDENT IN AN ORGANIZED HEALTH CARE EDUCATION/TRAINING PROGRAM

## 2022-05-06 PROCEDURE — 36415 COLL VENOUS BLD VENIPUNCTURE: CPT

## 2022-05-06 PROCEDURE — 99233 PR SUBSEQUENT HOSPITAL CARE,LEVL III: ICD-10-PCS | Mod: GC,,, | Performed by: HOSPITALIST

## 2022-05-06 PROCEDURE — 83735 ASSAY OF MAGNESIUM: CPT | Performed by: STUDENT IN AN ORGANIZED HEALTH CARE EDUCATION/TRAINING PROGRAM

## 2022-05-06 PROCEDURE — 84295 ASSAY OF SERUM SODIUM: CPT

## 2022-05-06 PROCEDURE — 63600175 PHARM REV CODE 636 W HCPCS

## 2022-05-06 PROCEDURE — 93005 ELECTROCARDIOGRAM TRACING: CPT

## 2022-05-06 PROCEDURE — 85730 THROMBOPLASTIN TIME PARTIAL: CPT | Performed by: STUDENT IN AN ORGANIZED HEALTH CARE EDUCATION/TRAINING PROGRAM

## 2022-05-06 PROCEDURE — 63700000 PHARM REV CODE 250 ALT 637 W/O HCPCS: Performed by: STUDENT IN AN ORGANIZED HEALTH CARE EDUCATION/TRAINING PROGRAM

## 2022-05-06 PROCEDURE — 94640 AIRWAY INHALATION TREATMENT: CPT

## 2022-05-06 RX ORDER — TALC
9 POWDER (GRAM) TOPICAL NIGHTLY
Status: DISCONTINUED | OUTPATIENT
Start: 2022-05-06 | End: 2022-05-13 | Stop reason: HOSPADM

## 2022-05-06 RX ORDER — SODIUM CHLORIDE, SODIUM LACTATE, POTASSIUM CHLORIDE, CALCIUM CHLORIDE 600; 310; 30; 20 MG/100ML; MG/100ML; MG/100ML; MG/100ML
INJECTION, SOLUTION INTRAVENOUS CONTINUOUS
Status: ACTIVE | OUTPATIENT
Start: 2022-05-06 | End: 2022-05-09

## 2022-05-06 RX ADMIN — CEFEPIME HYDROCHLORIDE 1 G: 1 INJECTION, SOLUTION INTRAVENOUS at 12:05

## 2022-05-06 RX ADMIN — IPRATROPIUM BROMIDE AND ALBUTEROL SULFATE 3 ML: 2.5; .5 SOLUTION RESPIRATORY (INHALATION) at 12:05

## 2022-05-06 RX ADMIN — PRAVASTATIN SODIUM 10 MG: 10 TABLET ORAL at 08:05

## 2022-05-06 RX ADMIN — SENNOSIDES 8.6 MG: 8.6 TABLET ORAL at 09:05

## 2022-05-06 RX ADMIN — HEPARIN SODIUM 5000 UNITS: 5000 INJECTION INTRAVENOUS; SUBCUTANEOUS at 02:05

## 2022-05-06 RX ADMIN — DEXAMETHASONE 4 MG: 4 TABLET ORAL at 08:05

## 2022-05-06 RX ADMIN — Medication 9 MG: at 09:05

## 2022-05-06 RX ADMIN — SODIUM CHLORIDE SOLN NEBU 3% 4 ML: 3 NEBU SOLN at 08:05

## 2022-05-06 RX ADMIN — SILODOSIN 4 MG: 4 CAPSULE ORAL at 08:05

## 2022-05-06 RX ADMIN — IPRATROPIUM BROMIDE AND ALBUTEROL SULFATE 3 ML: 2.5; .5 SOLUTION RESPIRATORY (INHALATION) at 08:05

## 2022-05-06 RX ADMIN — HEPARIN SODIUM 5000 UNITS: 5000 INJECTION INTRAVENOUS; SUBCUTANEOUS at 06:05

## 2022-05-06 RX ADMIN — METRONIDAZOLE 500 MG: 500 TABLET ORAL at 01:05

## 2022-05-06 RX ADMIN — FAMOTIDINE 20 MG: 20 TABLET ORAL at 08:05

## 2022-05-06 RX ADMIN — HEPARIN SODIUM 5000 UNITS: 5000 INJECTION INTRAVENOUS; SUBCUTANEOUS at 09:05

## 2022-05-06 RX ADMIN — SODIUM CHLORIDE, SODIUM LACTATE, POTASSIUM CHLORIDE, AND CALCIUM CHLORIDE: 600; 310; 30; 20 INJECTION, SOLUTION INTRAVENOUS at 09:05

## 2022-05-06 RX ADMIN — FAMOTIDINE 20 MG: 20 TABLET ORAL at 09:05

## 2022-05-06 RX ADMIN — ACETAMINOPHEN 1000 MG: 500 TABLET ORAL at 09:05

## 2022-05-06 RX ADMIN — CEFEPIME HYDROCHLORIDE 1 G: 1 INJECTION, SOLUTION INTRAVENOUS at 08:05

## 2022-05-06 RX ADMIN — DEXAMETHASONE 2 MG: 1 TABLET ORAL at 09:05

## 2022-05-06 RX ADMIN — METRONIDAZOLE 500 MG: 500 TABLET ORAL at 06:05

## 2022-05-06 RX ADMIN — AZITHROMYCIN MONOHYDRATE 500 MG: 250 TABLET ORAL at 08:05

## 2022-05-06 RX ADMIN — LACTULOSE 10 G: 20 SOLUTION ORAL at 09:05

## 2022-05-06 RX ADMIN — POLYETHYLENE GLYCOL 3350 17 G: 17 POWDER, FOR SOLUTION ORAL at 08:05

## 2022-05-06 RX ADMIN — LIDOCAINE 1 PATCH: 50 PATCH CUTANEOUS at 12:05

## 2022-05-06 RX ADMIN — SENNOSIDES 8.6 MG: 8.6 TABLET ORAL at 08:05

## 2022-05-06 RX ADMIN — ETHAMBUTOL HYDROCHLORIDE 800 MG: 400 TABLET, FILM COATED ORAL at 08:05

## 2022-05-06 RX ADMIN — ACETAMINOPHEN 1000 MG: 500 TABLET ORAL at 02:05

## 2022-05-06 NOTE — PLAN OF CARE
Problem: Adult Inpatient Plan of Care  Goal: Plan of Care Review  Outcome: Ongoing, Progressing  Goal: Patient-Specific Goal (Individualized)  Description: Admit Date: 2022    Epidural hematoma    Past Medical History:  2021: Antiphospholipid syndrome  No date: Dysphagia  No date: Hx of colonic polyps      Comment:  followed by GI. Dr. Pham  No date: Hyperlipidemia LDL goal <100  No date: Overweight(278.02)  2011: Prostate cancer      Comment:  followed by urology, Dr. Rogers  No date: Type II or unspecified type diabetes mellitus without   mention of complication, not stated as uncontrolled      Comment:  diet controlled    Past Surgical History:  10/15/2018: BRONCHOSCOPY; N/A      Comment:  Procedure: BRONCHOSCOPY;  Surgeon: [facility]                Provider;  Location: [facility] OR [address] [place] [address];  Service:                Anesthesiology;  Laterality: N/A;  2014: CATARACT EXTRACTION, BILATERAL  2022: POSTERIOR CERVICAL LAMINECTOMY; Bilateral      Comment:  Procedure: LAMINECTOMY, SPINE, CERVICAL, POSTERIOR                APPROACH C3-6;  Surgeon: Carlos Miller MD;  Location:                [address];  Service: Neurosurgery;  Laterality:                Bilateral;  No date: PROSTATECTOMY    Individualization:   1. Dim lights at night  2. Pt likes blanket off  3. Pt does not like wedge under feet    Restraints:   Restraint Order  Length of Order: Order good for next 24 hours or when removed.  Date that the current order will : 22  Time that the current order will :   Order Upon Application: Yes         Outcome: Ongoing, Progressing  Goal: Absence of Hospital-Acquired Illness or Injury  Outcome: Ongoing, Progressing  Goal: Optimal Comfort and Wellbeing  Outcome: Ongoing, Progressing  Goal: Readiness for Transition of Care  Outcome: Ongoing, Progressing     Problem: Fall Injury Risk  Goal: Absence of Fall and Fall-Related Injury  Outcome: Ongoing, Progressing      Problem: Infection  Goal: Absence of Infection Signs and Symptoms  Outcome: Ongoing, Progressing     Problem: Diabetes Comorbidity  Goal: Blood Glucose Level Within Targeted Range  Outcome: Ongoing, Progressing     Problem: Adjustment to Illness (Stroke, Hemorrhagic)  Goal: Optimal Coping  Outcome: Ongoing, Progressing     Problem: Cerebral Tissue Perfusion (Stroke, Hemorrhagic)  Goal: Optimal Cerebral Tissue Perfusion  Outcome: Ongoing, Progressing     Problem: Pain (Stroke, Hemorrhagic)  Goal: Acceptable Pain Control  Outcome: Ongoing, Progressing     Problem: Swallowing Impairment (Stroke, Hemorrhagic)  Goal: Optimal Eating and Swallowing Without Aspiration  Outcome: Ongoing, Progressing     Problem: Urinary Elimination Impaired (Stroke, Hemorrhagic)  Goal: Effective Urinary Elimination  Outcome: Ongoing, Progressing     Problem: Adjustment to Illness (Delirium)  Goal: Optimal Coping  Outcome: Ongoing, Progressing     Problem: Skin Injury Risk Increased  Goal: Skin Health and Integrity  Outcome: Ongoing, Progressing    POC reviewed with the patient and they verbalized understanding. All comments and concerns addressed. Bed locked in lowest position with bed alarm set, call light within reach. Safety precautions maintained. VSS, see flowsheets. No events this shift. Will continue to monitor for changes to POC and clinical condition.

## 2022-05-06 NOTE — PROGRESS NOTES
[facility] - Neurosurgery ([facility])  Neurosurgery  Progress Note    Subjective:     History of Present Illness: Patient is a 80 y/o male with antiphospholipid syndrome on chronic coumadin, MAC pneumonia, HLD, and left PE diagnosed January 2021 who was transferred from  for compressive epidural hematoma in the cervical spine. Patient reports that he woke up in the middle of the night and could no move the right side of his body. He went to  ED and stroke code initiated. Patient was not given tPA but given ASA. CTA was negative for acute CVA but did show compressive fluid collective from C3-C6. MRI cervical spine confirmed. Patient was transferred to [facility] ED for neurosurgical evaluation. He reports he cannot move his right arm out of the contracted position, he also cannot lift his right leg off the bed. He reports the right arm and leg are painful. No complaints on left side of body. Denies b/b dysfunction.       Post-Op Info:  Procedure(s) (LRB):  LAMINECTOMY, SPINE, CERVICAL, POSTERIOR APPROACH C3-6 (Bilateral)   8 Days Post-Op     Interval History: AFVSS. Neuro stable. Respiratory cultures to be collected. Continue Zosyn. Pending rehab. Requires physician surveillance in setting of recent spinal surgery and urinary retention.     Medications:  Continuous Infusions:   lactated ringers 125 mL/hr at 05/06/22 0925     Scheduled Meds:   acetaminophen  1,000 mg Oral TID    albuterol-ipratropium  3 mL Nebulization Q6H    azithromycin  500 mg Oral Daily    ceFEPime (MAXIPIME) IVPB  1 g Intravenous Q8H    dexAMETHasone  2 mg Oral Q6H    Followed by    [START ON 5/9/2022] dexAMETHasone  2 mg Oral Q8H    Followed by    [START ON 5/10/2022] dexAMETHasone  2 mg Oral Q12H    Followed by    [START ON 5/13/2022] dexAMETHasone  2 mg Oral Daily    ethambutoL  800 mg Oral Daily    famotidine  20 mg Oral BID    heparin (porcine)  5,000 Units Subcutaneous Q8H    LIDOcaine  1 patch Transdermal Q24H    metroNIDAZOLE  500  "mg Oral Q8H    morphine  2 mg Intravenous Once    polyethylene glycol  17 g Oral Daily    pravastatin  10 mg Oral Daily    senna  8.6 mg Oral BID    silodosin  4 mg Oral Daily    sodium chloride 3%  4 mL Nebulization BID     PRN Meds:acetaminophen, dextrose 10%, dextrose 10%, glucagon (human recombinant), insulin aspart U-100, lactulose, melatonin, ondansetron, oxyCODONE     Review of Systems  Objective:     Weight: 74.8 kg (165 lb)  Body mass index is 24.37 kg/m².  Vital Signs (Most Recent):  Temp: 98.6 °F (37 °C) (05/06/22 1119)  Pulse: 60 (05/06/22 1119)  Resp: 18 (05/06/22 1119)  BP: 135/61 (05/06/22 1119)  SpO2: 98 % (05/06/22 1119) Vital Signs (24h Range):  Temp:  [98 °F (36.7 °C)-98.6 °F (37 °C)] 98.6 °F (37 °C)  Pulse:  [51-77] 60  Resp:  [16-19] 18  SpO2:  [95 %-98 %] 98 %  BP: (105-145)/(57-87) 135/61     Date 05/06/22 0700 - 05/07/22 0659   Shift 8996-3858 4729-9098 2065-1074 24 Hour Total   INTAKE   Shift Total(mL/kg)       OUTPUT   Urine(mL/kg/hr) 1150   1150   Shift Total(mL/kg) 1150(15.4)   1150(15.4)   Weight (kg) 74.8 74.8 74.8 74.8                        Urethral Catheter 05/05/22 0200 Straight-tip;Non-latex (Active)   Site Assessment Clean;Intact 05/06/22 0926   Collection Container Standard drainage bag 05/06/22 0926   Securement Method secured to top of thigh w/ adhesive device 05/06/22 0926   Catheter Care Performed yes 05/06/22 0926   Reason for Continuing Urinary Catheterization Urinary retention 05/06/22 0926   CAUTI Prevention Bundle Securement Device in place with 1" slack;Drainage bag/urimeter off the floor;Intact seal between catheter & drainage tubing;Sheeting clip in use;No dependent loops or kinks;Drainage bag/urimeter not overfilled (<2/3 full);Drainage bag/urimeter below bladder 05/06/22 0926   Output (mL) 1150 mL 05/06/22 0735       Neurosurgery Physical Exam  General: well developed, well nourished, no distress.   Head: normocephalic, atraumatic  Neurologic: Alert and " oriented. Thought content somewhat appropriate. Some conversation hard to follow, patient with higher level confusion  GCS: Motor: 6/Verbal: 5/Eyes: 4 GCS Total: 15  Mental Status: Awake, Alert, Oriented x 3 (not oriented to year)  Language: No aphasia  Speech: No dysarthria  Cranial nerves: face symmetric, tongue midline, CN II-XII grossly intact.   Eyes: pupils equal, round, reactive to light with accommodation, EOMI.   Pulmonary: normal respirations, no signs of respiratory distress  Abdomen: soft,slightly distended  Skin: Skin is warm, dry and intact.  Sensory: intact to light touch throughout     Motor Strength:Moves all extremities spontaneously with good tone.  Full strength upper and lower extremities. No abnormal movements seen.      Strength   Deltoids Triceps Biceps Wrist Extension Wrist Flexion Hand    Upper: R 5/5 5/5 5/5 5/5 5/5 5/5     L 5/5 5/5 5/5 5/5 5/5 5/5       Iliopsoas Quadriceps Knee  Flexion Tibialis  anterior Gastro- cnemius EHL   Lower: R 5/5 5/5 5/5 5/5 5/5 5/5     L 5/5 5/5 5/5 5/5 5/5 5/5      Posterior cervical incision: Clean, dry, staples intact. Skin edges     Significant Labs:  Recent Labs   Lab 05/05/22 1001 05/05/22 1949 05/06/22  0739   *  --  122*   * 141 143  145   K 3.6  --  3.6   *  --  110   CO2 27  --  29   BUN 16  --  11   CREATININE 0.9  --  0.7   CALCIUM 9.3  --  8.9   MG 2.2  --  1.9     Recent Labs   Lab 05/05/22 1001 05/06/22  0739   WBC 14.25* 13.87*   HGB 11.9* 10.4*   HCT 37.6* 33.6*    231     Recent Labs   Lab 05/05/22 1001 05/06/22  0739   INR 1.1 1.1   APTT 30.3 35.5*     Microbiology Results (last 7 days)       Procedure Component Value Units Date/Time    Blood culture [[id number]] Collected: 05/03/22 1542    Order Status: Completed Specimen: Blood from Peripheral, Left Hand Updated: 05/05/22 1612     Blood Culture, Routine No Growth to date      No Growth to date      No Growth to date    Narrative:      Collection has been  rescheduled by 6 at 05/03/2022 12:38 Reason:   Patient unavailable going yo ct will yry back  Collection has been rescheduled by 6 at 05/03/2022 12:41 Reason:   Spe with rn romy Patient unavailable going to ct  Collection has been rescheduled by 6 at 05/03/2022 14:03 Reason:   Patient unavailable in ultrasound sabino valenzuela said come back in an hr   Collection has been rescheduled by 6 at 05/03/2022 12:38 Reason:   Patient unavailable going yo ct will yry back  Collection has been rescheduled by 6 at 05/03/2022 12:41 Reason:   Spe with rn romy Patient unavailable going to ct  Collection has been rescheduled by [facility] at 05/03/2022 14:03 Reason:   Patient unavailable in ultrasound rn nicolas said come back in an hr     Blood culture [[id number]] Collected: 05/03/22 1543    Order Status: Completed Specimen: Blood from Peripheral, Right Hand Updated: 05/05/22 1612     Blood Culture, Routine No Growth to date      No Growth to date      No Growth to date    Narrative:      Collection has been rescheduled by [facility] at 05/03/2022 12:38 Reason:   Patient unavailable going yo ct will yry back  Collection has been rescheduled by [facility] at 05/03/2022 12:41 Reason:   Spe with sabino statnon Patient unavailable going to ct  Collection has been rescheduled by [facility] at 05/03/2022 14:03 Reason:   Patient unavailable in ultrasound sabino valenzuela said come back in an hr   Collection has been rescheduled by [facility] at 05/03/2022 12:38 Reason:   Patient unavailable going yo ct will yry back  Collection has been rescheduled by [facility] at 05/03/2022 12:41 Reason:   Spe with rn romy Patient unavailable going to ct  Collection has been rescheduled by [facility] at 05/03/2022 14:03 Reason:   Patient unavailable in ultrasound sabino valenzuela said come back in an hr     AFB Culture & Smear [[id number]]     Order Status: No result Specimen: Respiratory from Sputum, Expectorated     Culture, Respiratory with Gram Stain [[id number]]     Order Status: No result Specimen: Respiratory      Blood culture [[id number]] Collected: 04/28/22 1201    Order Status: Completed Specimen: Blood from Peripheral, Forearm, Right Updated: 05/02/22 1303     Blood Culture, Routine No Growth after 4 days.     Blood culture [[id number]] Collected: 04/28/22 1201    Order Status: Completed Specimen: Blood from Peripheral, Hand, Left Updated: 05/02/22 1303     Blood Culture, Routine No Growth after 4 days.           All pertinent labs from the last 24 hours have been reviewed.    Significant Diagnostics:  I have reviewed all pertinent imaging results/findings within the past 24 hours.    Assessment/Plan:     * Epidural hematoma  82 y/o male with APS on coumadin, hx PE, MAC pneumonia, HLD with spontaneous C3-C6 epidural hematoma compressing the spinal cord    S/p  C3-C6 laminectomy and fusion for evacuation of epidural hematoma 4/28    Stepped down to NPU    -- CTH 4/29 no acute intracranial process   -- Post op MRI C spine satisfactory decompression with continued cord signal edema   --postop XR C spine hardware in good position  --Hypotension: BP stable on 5/5 s/p IVF. Appreciate HM assistance.    --Continue Zosyn for empiric coverage of gram neg and anaerobes.   --Reglan discontinued  -- Acute encephalopathy:    --CTH 5/3 ordered per medicine team for some confusion: without evidence of acute hemorrhage.    --Hypernatremia: Na 148 today, continue to monitor daily   --BLE US wo DVT, UA neg, CXR negative for acute processes   --Telesitter discontinued. Patient no longer in restraints.  -- Bilateral hydroureteronephrosis: Catheter in place. Urology following.   -- C-collar to be worn when OOB  -- HV drains removed 4/30  -- MAP goals completed in ICU  -- History of antiphospholipid syndrome and PE: hold warfarin. Heme/onc following.   -- SLP recommending regular diet with nectar thick liquids. MBSS today to assess if patient requires continued thickened liquids  -- Dex taper to off.   -- Constipation: Bowel movement s/p enema  on 5/3  -- Activity: PT/OT recs for rehab  -- DVT prophylaxis: TEDs/SCDs/SQH  --Concern for hematoma vs abscess. Final path returned as blood clot. Daptomycin discontinued per ID recs.     Dispo: Pending rehab, referrals sent. Patient requiring physician care due to bilateral hydroureteronephrosis.         Isabel Barrientos PA-C  Neurosurgery  [facility] - Neurosurgery ([facility])

## 2022-05-06 NOTE — ASSESSMENT & PLAN NOTE
81 year old male with acute R sided weakness found to have C3-C6 compressive fluid collection on MRI. Underwent spinal decompression and fluid evacuation with NSGY 4/28. ID consulted for management of epidural hematoma vs abscess. Pathology reports fibrin blood clot. No evidence of infection. Primary team has reached out to micro lab to see if a sample can be sent for culture. However, patient afebrile with downtrending white count and no acute symptoms on exam. Reports feeling well with no new concerns since surgery.     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 Rao in ID clinic

## 2022-05-06 NOTE — PT/OT/SLP PROGRESS
"Speech Language Pathology Treatment    Patient Name:  Diego Gunter   MRN:  [id number]  Admitting Diagnosis: Epidural hematoma    Recommendations:                            General Recommendations:  Dysphagia therapy  Diet recommendations:  Mechanical soft, Thin   Per IDDSI guidelines, all items must be minced and moist. Please avoid mixed consistencies (such as cereals, soups with large pieces of meat/vegetable, etc.), avoid dry/coarse/crumbly items such as rice ,nuts, seeds, dried fruit, coconut, avoid fibrous foods, avoid tough skins, avoid tough vegetables (i.e. no corn, brussel sprouts,etc) avoid chewy/sticky foods (i.e. no peanut putter, caramel, licorice, etc.)    Aspiration Precautions: 1 bite/sip at a time, Double swallow with each bite/sip, Eliminate distractions, Feed only when awake/alert, Frequent oral care, HOB to 90 degrees, Meds crushed in puree, Remain upright 30 minutes post meal, Small bites/sips and Strict aspiration precautions. Please continue to monitor for signs and symptoms of aspiration and discontinue oral feeding should you notice any of the following: watery eyes, reddened facial area, wet vocal quality, increased work of breathing, change in respiratory status, increased congestion, coughing, fever and/or change in level of alertness.  General Precautions: Standard, aspiration, fall  Communication strategies:  go to room if call light pushed    Subjective     SLP reviewed Pt with RN  Additional SLP evaluation orders received and reviewed, ST currently following for dysphagia and cognitive-linguistic tx  Pt presents calm  He explains, "I've been chewing on it"     Pain/Comfort:  · Pain Rating 1: 0/10    Respiratory Status: Room air    Objective:     Has the patient been evaluated by SLP for swallowing?   Yes  Keep patient NPO? No   Current Respiratory Status:  room air       Patient found awake and upright in bed with RT and RN at bedside. SLP explained she would re-attempt later " service day.   Upon later attempt, Patient found upright in bed with cervical collar in place eating lunch meal tray fed by Spouse at bedside. Family in room. Pt was alert and attentive. He was seen with bites of soft textures (beans, fish)x5 and sips thin liquids (straw sips)x5. No overt S/S aspiration with bites/sips observed provided occasional cues for use of double swallows between bites. Pt and Family were educated on MBS findings, ongoing safe swallow strategies and S/S aspiration for ongoing monitoring. No questions noted. Pt verbalized partial understanding while Spouse verbalized understanding. Language tx tasks deferred 2/2 Patient eating lunch meal tray.  Pt remained upright in bed in position as found upon SLP exit.     Assessment:     Diego Gunter is a 81 y.o. male with an SLP diagnosis of Aphasia and Dysphagia.     Goals:   Multidisciplinary Problems     SLP Goals        Problem: SLP    Goal Priority Disciplines Outcome   SLP Goal    Low SLP Ongoing, Progressing   Description: Speech Language Pathology Goals  Goals expected to be met by 5/6:  1. Patient will tolerate a puree diet and nectar thickened liquids with no overt signs of airway compromise.   2. Patient will answer simple yes/no questions with 50% acc indep.   3. Patient will complete auto speech tasks with 60% acc given min A.   4. Patient will follow simple commands with 60% acc given min A.   5. Patient will participate in further assessments as appropriate.                              Plan:     · Patient to be seen:  4 x/week   · Plan of Care expires:     · Plan of Care reviewed with:  patient   · SLP Follow-Up:  Yes       Discharge recommendations:  rehabilitation facility       Time Tracking:     SLP Treatment Date:   05/06/22  Speech Start Time:  1223  Speech Stop Time:  1231     Speech Total Time (min):  8 min    Billable Minutes: Treatment Swallowing Dysfunction 8    05/06/2022

## 2022-05-06 NOTE — PLAN OF CARE
CHADD rec'd message from Yanira with [facility] stating that case would be sent to [facility].  CHADD provided Isabel Barrientos contact infor to [facility] for peer to peer.

## 2022-05-06 NOTE — ASSESSMENT & PLAN NOTE
82 y/o male with APS on coumadin, hx PE, MAC pneumonia, HLD with spontaneous C3-C6 epidural hematoma compressing the spinal cord    S/p  C3-C6 laminectomy and fusion for evacuation of epidural hematoma 4/28    Stepped down to NPU    -- CTH 4/29 no acute intracranial process   -- Post op MRI C spine satisfactory decompression with continued cord signal edema   --postop XR C spine hardware in good position  --Hypotension: BP stable on 5/5 s/p IVF. Appreciate HM assistance.    --Continue Zosyn for empiric coverage of gram neg and anaerobes.   --Reglan discontinued  -- Acute encephalopathy:    --CTH 5/3 ordered per medicine team for some confusion: without evidence of acute hemorrhage.    --Hypernatremia: Na 148 today, continue to monitor daily   --BLE US wo DVT, UA neg, CXR negative for acute processes   --Telesitter discontinued. Patient no longer in restraints.  -- Bilateral hydroureteronephrosis: Catheter in place. Urology following.   -- C-collar to be worn when OOB  -- HV drains removed 4/30  -- MAP goals completed in ICU  -- History of antiphospholipid syndrome and PE: hold warfarin. Heme/onc following.   -- SLP recommending regular diet with nectar thick liquids. MBSS today to assess if patient requires continued thickened liquids  -- Dex taper to off.   -- Constipation: Bowel movement s/p enema on 5/3  -- Activity: PT/OT recs for rehab  -- DVT prophylaxis: TEDs/SCDs/SQH  --Concern for hematoma vs abscess. Final path returned as blood clot. Daptomycin discontinued per ID recs.     Dispo: Pending rehab, referrals sent. Patient requiring physician care due to bilateral hydroureteronephrosis.

## 2022-05-06 NOTE — PLAN OF CARE
Recommendations    1. Suggest diabetic diet with texture per SLP     2. Add Boost Pudding BID to aid in caloric intake     3. RD following    Goals: Pt will meet and tolerate >75% EEN/EPN by RD f/u  Nutrition Goal Status: new

## 2022-05-06 NOTE — PROGRESS NOTES
"[facility] - Neurosurgery ([facility])  [facility] Medicine  Progress Note    Patient Name: Diego Gunter  MRN: [id number]  Patient Class: IP- Inpatient   Admission Date: 4/28/2022  Length of Stay: 8 days  Attending Physician: Carlos Miller MD  Primary Care Provider: Portia Ferreira MD        Subjective:     Principal Problem:Epidural hematoma        HPI:  Mr. Gunter is a 81 year-old male with antiphospholipid syndrome (diagnosed 11/2021, on warfarin), PE, HLD, MAC (diagnosed in 2018, on chronic ethambutol and azithromycin), previous prostate ca (diagnosed in 2011, s/p prostatectomy) who presented to the hospital on 04/28 with R-sided weakness. Stroke workup was negative, but imaging was consistent with a compressive epidural hematoma ni the cervical spine C3-C6. He underwent spinal decompression and fluid evacuation with NSGY on 04/28. Since admitted, the Hematology/Oncology service was consulted- recommended not resuming therapeutic anticoagulation for APLS given current bleeding event and starting ppx anticoagulation when deemed safe by nsgy. ID was also consulted for recommendations regarding whether the epidural fluid collection could be an infectious collection- recommended starting dapto until path returned. Hospital course has been complicated by shock with intermittent pressor requirements (off since 05/01) and waxing/waning encephalopathy. He was noted to have acute changes in mental status around 05/01 which prompted a workup- EEG was obtained without epileptiform changes but with evidence of diffuse background slowing. Medicine consulted for "medical management; APS previously on coumadin; post op delirium".      Overview/Hospital Course:  No notes on file    Interval History: NAEON. Pt with >6L UOP in the last 24h. Strict I/Os to monitor for post obstructive diuresis. HD stable, afebrile. Waxing/waning confusion with delirium precautions in place. Delirium believed to be multifactorial.    Review of " Systems   Constitutional:  Positive for fatigue. Negative for chills and fever.   HENT:  Negative for congestion and sore throat.    Respiratory:  Negative for cough and shortness of breath.    Cardiovascular:  Negative for chest pain, palpitations and leg swelling.   Gastrointestinal:  Positive for constipation. Negative for abdominal distention, abdominal pain, diarrhea, nausea and vomiting.   Genitourinary:  Negative for dysuria and frequency.   Musculoskeletal:  Negative for back pain and myalgias.   Skin:  Negative for color change and rash.   Neurological:  Negative for light-headedness and headaches.   Psychiatric/Behavioral:  Negative for confusion and hallucinations.    Objective:     Vital Signs (Most Recent):  Temp: 98.2 °F (36.8 °C) (05/06/22 0735)  Pulse: (!) 56 (05/06/22 0735)  Resp: 17 (05/06/22 0735)  BP: (!) 145/66 (05/06/22 0735)  SpO2: 97 % (05/06/22 0735)   Vital Signs (24h Range):  Temp:  [97.6 °F (36.4 °C)-98.6 °F (37 °C)] 98.2 °F (36.8 °C)  Pulse:  [51-77] 56  Resp:  [16-19] 17  SpO2:  [94 %-98 %] 97 %  BP: (105-145)/(57-87) 145/66     Weight: 74.8 kg (165 lb)  Body mass index is 24.37 kg/m².    Intake/Output Summary (Last 24 hours) at 5/6/2022 0751  Last data filed at 5/6/2022 0553  Gross per 24 hour   Intake --   Output 6350 ml   Net -6350 ml      Physical Exam  Constitutional:       General: He is not in acute distress.     Appearance: He is not ill-appearing or toxic-appearing.   HENT:      Head: Normocephalic and atraumatic.      Nose: No congestion or rhinorrhea.      Mouth/Throat:      Pharynx: No oropharyngeal exudate or posterior oropharyngeal erythema.   Eyes:      General: No scleral icterus.     Conjunctiva/sclera: Conjunctivae normal.   Cardiovascular:      Rate and Rhythm: Normal rate and regular rhythm.      Pulses: Normal pulses.      Heart sounds: Normal heart sounds.   Pulmonary:      Effort: Pulmonary effort is normal.      Breath sounds: Normal breath sounds.   Abdominal:  "     General: Abdomen is flat. Bowel sounds are normal. There is no distension.      Palpations: Abdomen is soft.      Tenderness: There is no abdominal tenderness. There is no guarding or rebound.   Genitourinary:     Comments: Floyd in place  Musculoskeletal:         General: No swelling.      Right lower leg: No edema.      Left lower leg: No edema.   Skin:     General: Skin is warm.      Coloration: Skin is not jaundiced.   Neurological:      General: No focal deficit present.      Mental Status: He is alert.      Comments: Oriented to person, intermittently to time and place  Follows commands appropriately  Strength 4/5 throughout  Sensation intact throughout   Psychiatric:         Mood and Affect: Mood normal.         Behavior: Behavior normal.       Significant Labs: All pertinent labs within the past 24 hours have been reviewed.    Significant Imaging: I have reviewed all pertinent imaging results/findings within the past 24 hours.      Assessment/Plan:      * Epidural hematoma  --continue management per primary      Hypernatremia  Secondary to dehydration  IVF, LR 125cc/hr and encourage enteral water intake as patient has post obstructive diuresis        Impaired mobility        Acute encephalopathy  --continue multimodal pain regimen and avoid sedatives as allowed  --follow infectious workup - NGTD  --cont cefepime/metronidazole   ID consulted, appreciate recs  --continue daily miralax and senna, PRN lactulose  --urinary retention with bilateral hydroureteronephrosis, urology following. Catheter in place.  -- d/c seroquel  -- Per family at bedside, patient is more talkative than baseline and patient reports hallucinations of "going to [place]" today. Concern for hyperactive delirium.    Urinary retention        Intermittent confusion        Acute thoracic back pain  -patient seen by vascular Surgical input appreciated.  No recommendation for intervention  -CTA chest/abdomen/pelvis showed no dissection, no " PE, questionable changes C3-C6 epidural process.    -MRI brain and C-spine ordered       Acute ischemic left MCA stroke  Patient has acute metabolic encephalopathy that is secondary to Cerebral ischemia. Patient's current mental status is Oriented person. Patient's baseline mental status is. awake and alert; oriented to person, place, and time Evaluation and for underlying cause(s) is underway and inclusive of Neuro-Imaging (MRI/CT). Will monitor neuro checks carefully, avoid narcotics and benzos that will exacerbate agitation, and use PRN medications for controls of behavior for self harm.   -CTA head and neck, no aneurysm or stenosis  -CTA chest/abdomen/pelvis, no PE, no dissection, questionable changes C3-C6 with need to rule out epidural process.  -MRI brain and C-spine ordered  -neurology consultation  -patient on Coumadin, INR 3.1  -added aspirin, statin  -CVA/TIA order set  -echo ordered  -neuro checks      Antiphospholipid syndrome        Hypercoagulable state        History of pulmonary embolism  --hold therapeutic anticoagulation at this time, per Heme/Onc recs, given recent bleed    Cognitive communication deficit  SLP consulted      Mild cognitive impairment        HILLARY (mycobacterium avium-intracellulare)  --continue ethambutol and azithromycin      Cavitary lung disease        Hyperlipidemia associated with type 2 diabetes mellitus  --continue statin      Prediabetes        History of prostate cancer          VTE Risk Mitigation (From admission, onward)         Ordered     heparin (porcine) injection 5,000 Units  Every 8 hours         04/29/22 1704     Reason for No Pharmacological VTE Prophylaxis  Once        Question:  Reasons:  Answer:  Risk of Bleeding    04/28/22 2058     IP VTE HIGH RISK PATIENT  Once         04/28/22 2058     Place sequential compression device  Until discontinued         04/28/22 2058                Discharge Planning   MALIA: 5/6/2022     Code Status: Full Code   Is the patient  medically ready for discharge?: No    Reason for patient still in hospital (select all that apply): Patient trending condition and Pending disposition  Discharge Plan A: Rehab   Discharge Delays: None known at this time              Marilee Mcginnis ([place] Name: MD Vasile  Department of Hospital Medicine   [facility] - Neurosurgery ([facility])

## 2022-05-06 NOTE — PLAN OF CARE
Problem: Adult Inpatient Plan of Care  Goal: Plan of Care Review  Outcome: Ongoing, Progressing  Goal: Optimal Comfort and Wellbeing  Outcome: Ongoing, Progressing     Problem: Fall Injury Risk  Goal: Absence of Fall and Fall-Related Injury  Outcome: Ongoing, Progressing     Problem: Infection  Goal: Absence of Infection Signs and Symptoms  Outcome: Ongoing, Progressing       Patient is AAO x4 with intermittent confusion. POC reviewed with patient. Patient verbalized understanding. Patient's breathing is unlabored with equal chest expansion. Patient has an incision on posterior spine; c-collar in place. Floyd in place for urinary retention. Patient remained free from falls. Patient rested well through shift. Sitter at bedside. Bed in lowest position,bed alarm on, side rails up x3, no complaints or signs of distress. WCTM.  See flowsheets for full assessment and VS info.

## 2022-05-06 NOTE — PT/OT/SLP PROGRESS
Occupational Therapy   Treatment       Patient Name:  Diego Gunter   MRN:  [id number]  Admit Date: 4/28/2022  Admitting Diagnosis:  Epidural hematoma   Length of Stay: 8 days  Recent Surgery: Procedure(s) (LRB):  LAMINECTOMY, SPINE, CERVICAL, POSTERIOR APPROACH C3-6 (Bilateral) 8 Days Post-Op    Recommendations:     Discharge Recommendations: rehabilitation facility  Discharge Equipment Recommendations:   (TBD)  Barriers to discharge:       Plan:     Patient to be seen 4 x/week to address the above listed problems via self-care/home management, therapeutic activities, therapeutic exercises, neuromuscular re-education  · Plan of Care Expires: 05/29/22  · Plan of Care Reviewed with: patient    Assessment:   Diego Gunter is a 81 y.o. male with a medical diagnosis of Epidural hematoma.  He presents with the following performance deficits affecting function: weakness, impaired endurance, impaired self care skills, impaired functional mobilty, gait instability, impaired balance, decreased coordination, decreased upper extremity function, decreased lower extremity function, decreased safety awareness, orthopedic precautions, decreased ROM, impaired joint extensibility.  Pt continues to benefit from a collaborative PT/OT/SLP program to improve quality of life and focus on recovery of impairments.     Pt still recommended to d/c to Rehab for continued improvement in deficit areas and ADLs/functional mobility for increased functional independence and OQL prior to retrun to home environment. Pt progressing well with decreased assistance for bed mobility and ambulation to engage in self care on this date. Pt remains ataxic during ambulation and required Min A with RW for management of RW and tactile/verbal cues for foot placement.    Rehab Prognosis: Good; patient would benefit from acute skilled OT services to address these deficits and reach maximum level of function.        Subjective   Communicated with: RN prior to  "session.  Patient found HOB elevated with telemetry, amaya catheter, cervical collar upon OT entry to room.    Patient: "So do I just back up?" "How do I go from here?"    Pain/Comfort:  · Pain Rating 1: 0/10  · Pain Rating Post-Intervention 1: 0/10    Objective:   Patient found with: telemetry, amaya catheter, cervical collar     General Precautions: Standard, Cardiac aspiration, fall   Orthopedic Precautions:spinal precautions   Braces: Cervical collar   Respiratory Status:    Room air  Vitals: /61 (BP Location: Right arm, Patient Position: Lying)   Pulse (!) 55   Temp 98.6 °F (37 °C) (Oral)   Resp 17   Ht 5' 9" (1.753 m)   Wt 74.8 kg (165 lb)   SpO2 98%   BMI 24.37 kg/m²     Outcome Measures:  [facility] 6 Click ADL: 17    Cognition:   · Oriented X 4, Alert and Cooperative  · Command following: follows one-step commands  · Communication: clear/fluent    Occupational Performance:  Bed Mobility:    · Patient completed Rolling/Turning to Left with  contact guard assistance  · Scooting to HOB in supine: supervision using BLE and BUE to scoot up.  · Patient completed Supine to Sit with contact guard assistance on L side of bed  · Scooting anteriorly to EOB to have both feet planted on floor: stand by assistance  · Patient completed Sit to Supine with contact guard assistance on L side of bed    Functional Mobility/Transfers:   Static Sitting EOB: SBA   Dynamic Sitting EOB: CGA   Patient completed Sit <> Stand Transfer with minimum assistance  with  rolling walker    Static Standing Balance: CGA with RW   Dynamic Standing Balance: Min A with RW ~20 ft to bathroom sink<>EOB to complete oral hygiene and facial hygiene at standing level.      Activities of Daily Living:  · Grooming: contact guard assistance to engage in oral hygiene and facial hygiene at standing level at sink.  · Toileting: total assistance via amaya catheter to urinate multiple times during session.    [facility] 6 Click ADL:  [facility] Total " Score: 17    Treatment & Education:  -OT POC, safety during ADLs and mobility   -Education on energy conservation and task modification to maximize safety and independence  -Questions answered within OT scope of practice.      Patient left HOB elevated with all lines intact, call button in reach, RN notified and family present    GOALS:   Multidisciplinary Problems     Occupational Therapy Goals        Problem: Occupational Therapy    Goal Priority Disciplines Outcome Interventions   Occupational Therapy Goal     OT, PT/OT Ongoing, Progressing    Description: Goals to be met by: 5/28/2022     Patient will increase functional independence with ADLs by performing:    Feeding with Set-up Assistance.  UE Dressing with Minimal Assistance.  LE Dressing with Moderate Assistance.  Grooming while seated with Set-up Assistance.  Toileting from bedside commode with Minimal Assistance for hygiene and clothing management.   Sitting at edge of bed x15 minutes with Supervision.  Toilet transfer to bedside commode with Contact Guard Assistance.                     Time Tracking:     OT Date of Treatment: 05/06/22  OT Start Time: 1501  OT Stop Time: 1530  OT Total Time (min): 29 min  Additional staff present: RN      Billable Minutes:Self Care/Home Management 29 min      5/6/2022

## 2022-05-06 NOTE — TELEPHONE ENCOUNTER
I spoke with patient's wife who agreed to appt date,time,location for renal ultrasound and residents clinic.      ----- Message from Jose De Dios MD sent at 5/5/2022  7:25 AM CDT -----  Regarding: Please schedule patient for resident clinic with renal US in 2 weeks  Hello! Please schedule patient for resident clinic for with renal US and VT in 2 weeks. He has b/l hydro currently.    Thank you  Jose De Dios MD  Urology, PGY-1  [facility]

## 2022-05-06 NOTE — SUBJECTIVE & OBJECTIVE
Interval History: AFVSS. Neuro stable. Respiratory cultures to be collected. Continue Zosyn. Pending rehab. Requires physician surveillance in setting of recent spinal surgery and urinary retention.     Medications:  Continuous Infusions:   lactated ringers 125 mL/hr at 05/06/22 0925     Scheduled Meds:   acetaminophen  1,000 mg Oral TID    albuterol-ipratropium  3 mL Nebulization Q6H    azithromycin  500 mg Oral Daily    ceFEPime (MAXIPIME) IVPB  1 g Intravenous Q8H    dexAMETHasone  2 mg Oral Q6H    Followed by    [START ON 5/9/2022] dexAMETHasone  2 mg Oral Q8H    Followed by    [START ON 5/10/2022] dexAMETHasone  2 mg Oral Q12H    Followed by    [START ON 5/13/2022] dexAMETHasone  2 mg Oral Daily    ethambutoL  800 mg Oral Daily    famotidine  20 mg Oral BID    heparin (porcine)  5,000 Units Subcutaneous Q8H    LIDOcaine  1 patch Transdermal Q24H    metroNIDAZOLE  500 mg Oral Q8H    morphine  2 mg Intravenous Once    polyethylene glycol  17 g Oral Daily    pravastatin  10 mg Oral Daily    senna  8.6 mg Oral BID    silodosin  4 mg Oral Daily    sodium chloride 3%  4 mL Nebulization BID     PRN Meds:acetaminophen, dextrose 10%, dextrose 10%, glucagon (human recombinant), insulin aspart U-100, lactulose, melatonin, ondansetron, oxyCODONE     Review of Systems  Objective:     Weight: 74.8 kg (165 lb)  Body mass index is 24.37 kg/m².  Vital Signs (Most Recent):  Temp: 98.6 °F (37 °C) (05/06/22 1119)  Pulse: 60 (05/06/22 1119)  Resp: 18 (05/06/22 1119)  BP: 135/61 (05/06/22 1119)  SpO2: 98 % (05/06/22 1119) Vital Signs (24h Range):  Temp:  [98 °F (36.7 °C)-98.6 °F (37 °C)] 98.6 °F (37 °C)  Pulse:  [51-77] 60  Resp:  [16-19] 18  SpO2:  [95 %-98 %] 98 %  BP: (105-145)/(57-87) 135/61     Date 05/06/22 0700 - 05/07/22 0659   Shift 2380-1006 9662-2692 7611-7392 24 Hour Total   INTAKE   Shift Total(mL/kg)       OUTPUT   Urine(mL/kg/hr) 1150   1150   Shift Total(mL/kg) 1150(15.4)   1150(15.4)   Weight (kg) 74.8 74.8 74.8  "74.8                        Urethral Catheter 05/05/22 0200 Straight-tip;Non-latex (Active)   Site Assessment Clean;Intact 05/06/22 0926   Collection Container Standard drainage bag 05/06/22 0926   Securement Method secured to top of thigh w/ adhesive device 05/06/22 0926   Catheter Care Performed yes 05/06/22 0926   Reason for Continuing Urinary Catheterization Urinary retention 05/06/22 0926   CAUTI Prevention Bundle Securement Device in place with 1" slack;Drainage bag/urimeter off the floor;Intact seal between catheter & drainage tubing;Sheeting clip in use;No dependent loops or kinks;Drainage bag/urimeter not overfilled (<2/3 full);Drainage bag/urimeter below bladder 05/06/22 0926   Output (mL) 1150 mL 05/06/22 0735       Neurosurgery Physical Exam  General: well developed, well nourished, no distress.   Head: normocephalic, atraumatic  Neurologic: Alert and oriented. Thought content somewhat appropriate. Some conversation hard to follow, patient with higher level confusion  GCS: Motor: 6/Verbal: 5/Eyes: 4 GCS Total: 15  Mental Status: Awake, Alert, Oriented x 3 (not oriented to year)  Language: No aphasia  Speech: No dysarthria  Cranial nerves: face symmetric, tongue midline, CN II-XII grossly intact.   Eyes: pupils equal, round, reactive to light with accommodation, EOMI.   Pulmonary: normal respirations, no signs of respiratory distress  Abdomen: soft,slightly distended  Skin: Skin is warm, dry and intact.  Sensory: intact to light touch throughout     Motor Strength:Moves all extremities spontaneously with good tone.  Full strength upper and lower extremities. No abnormal movements seen.      Strength   Deltoids Triceps Biceps Wrist Extension Wrist Flexion Hand    Upper: R 5/5 5/5 5/5 5/5 5/5 5/5     L 5/5 5/5 5/5 5/5 5/5 5/5       Iliopsoas Quadriceps Knee  Flexion Tibialis  anterior Gastro- cnemius EHL   Lower: R 5/5 5/5 5/5 5/5 5/5 5/5     L 5/5 5/5 5/5 5/5 5/5 5/5      Posterior cervical incision: " Clean, dry, staples intact. Skin edges     Significant Labs:  Recent Labs   Lab 05/05/22  1001 05/05/22 1949 05/06/22  0739   *  --  122*   * 141 143  145   K 3.6  --  3.6   *  --  110   CO2 27  --  29   BUN 16  --  11   CREATININE 0.9  --  0.7   CALCIUM 9.3  --  8.9   MG 2.2  --  1.9     Recent Labs   Lab 05/05/22  1001 05/06/22  0739   WBC 14.25* 13.87*   HGB 11.9* 10.4*   HCT 37.6* 33.6*    231     Recent Labs   Lab 05/05/22 1001 05/06/22  0739   INR 1.1 1.1   APTT 30.3 35.5*     Microbiology Results (last 7 days)       Procedure Component Value Units Date/Time    Blood culture [[id number]] Collected: 05/03/22 1542    Order Status: Completed Specimen: Blood from Peripheral, Left Hand Updated: 05/05/22 1612     Blood Culture, Routine No Growth to date      No Growth to date      No Growth to date    Narrative:      Collection has been rescheduled by [facility] at 05/03/2022 12:38 Reason:   Patient unavailable going yo ct will yry back  Collection has been rescheduled by [facility] at 05/03/2022 12:41 Reason:   Spe with sabino stanton Patient unavailable going to ct  Collection has been rescheduled by [facility] at 05/03/2022 14:03 Reason:   Patient unavailable in ultrasound sabino valenzuela said come back in an hr   Collection has been rescheduled by [facility] at 05/03/2022 12:38 Reason:   Patient unavailable going yo ct will yry back  Collection has been rescheduled by [facility] at 05/03/2022 12:41 Reason:   Spe with sabino stanton Patient unavailable going to ct  Collection has been rescheduled by [facility] at 05/03/2022 14:03 Reason:   Patient unavailable in ultrasound sabino valenzuela said come back in an hr     Blood culture [[id number]] Collected: 05/03/22 1543    Order Status: Completed Specimen: Blood from Peripheral, Right Hand Updated: 05/05/22 1612     Blood Culture, Routine No Growth to date      No Growth to date      No Growth to date    Narrative:      Collection has been rescheduled by 6 at 05/03/2022 12:38 Reason:   Patient  unavailable going yo ct will yry back  Collection has been rescheduled by [facility] at 05/03/2022 12:41 Reason:   Spe with rn romy Patient unavailable going to ct  Collection has been rescheduled by [facility] at 05/03/2022 14:03 Reason:   Patient unavailable in ultrasound rn nicolas said come back in an hr   Collection has been rescheduled by [facility] at 05/03/2022 12:38 Reason:   Patient unavailable going yo ct will yry back  Collection has been rescheduled by [facility] at 05/03/2022 12:41 Reason:   Spe with rn romy Patient unavailable going to ct  Collection has been rescheduled by [facility] at 05/03/2022 14:03 Reason:   Patient unavailable in ultrasound sabino valenzuela said come back in an hr     AFB Culture & Smear [[id number]]     Order Status: No result Specimen: Respiratory from Sputum, Expectorated     Culture, Respiratory with Gram Stain [[id number]]     Order Status: No result Specimen: Respiratory     Blood culture [[id number]] Collected: 04/28/22 1201    Order Status: Completed Specimen: Blood from Peripheral, Forearm, Right Updated: 05/02/22 1303     Blood Culture, Routine No Growth after 4 days.     Blood culture [[id number]] Collected: 04/28/22 1201    Order Status: Completed Specimen: Blood from Peripheral, Hand, Left Updated: 05/02/22 1303     Blood Culture, Routine No Growth after 4 days.           All pertinent labs from the last 24 hours have been reviewed.    Significant Diagnostics:  I have reviewed all pertinent imaging results/findings within the past 24 hours.

## 2022-05-06 NOTE — SUBJECTIVE & OBJECTIVE
Interval History: NAEON. Pt with >6L UOP in the last 24h. Strict I/Os to monitor for post obstructive diuresis. HD stable, afebrile. Waxing/waning confusion with delirium precautions in place. Delirium believed to be multifactorial.    Review of Systems   Constitutional:  Positive for fatigue. Negative for chills and fever.   HENT:  Negative for congestion and sore throat.    Respiratory:  Negative for cough and shortness of breath.    Cardiovascular:  Negative for chest pain, palpitations and leg swelling.   Gastrointestinal:  Positive for constipation. Negative for abdominal distention, abdominal pain, diarrhea, nausea and vomiting.   Genitourinary:  Negative for dysuria and frequency.   Musculoskeletal:  Negative for back pain and myalgias.   Skin:  Negative for color change and rash.   Neurological:  Negative for light-headedness and headaches.   Psychiatric/Behavioral:  Negative for confusion and hallucinations.    Objective:     Vital Signs (Most Recent):  Temp: 98.2 °F (36.8 °C) (05/06/22 0735)  Pulse: (!) 56 (05/06/22 0735)  Resp: 17 (05/06/22 0735)  BP: (!) 145/66 (05/06/22 0735)  SpO2: 97 % (05/06/22 0735)   Vital Signs (24h Range):  Temp:  [97.6 °F (36.4 °C)-98.6 °F (37 °C)] 98.2 °F (36.8 °C)  Pulse:  [51-77] 56  Resp:  [16-19] 17  SpO2:  [94 %-98 %] 97 %  BP: (105-145)/(57-87) 145/66     Weight: 74.8 kg (165 lb)  Body mass index is 24.37 kg/m².    Intake/Output Summary (Last 24 hours) at 5/6/2022 0751  Last data filed at 5/6/2022 0553  Gross per 24 hour   Intake --   Output 6350 ml   Net -6350 ml      Physical Exam  Constitutional:       General: He is not in acute distress.     Appearance: He is not ill-appearing or toxic-appearing.   HENT:      Head: Normocephalic and atraumatic.      Nose: No congestion or rhinorrhea.      Mouth/Throat:      Pharynx: No oropharyngeal exudate or posterior oropharyngeal erythema.   Eyes:      General: No scleral icterus.     Conjunctiva/sclera: Conjunctivae normal.    Cardiovascular:      Rate and Rhythm: Normal rate and regular rhythm.      Pulses: Normal pulses.      Heart sounds: Normal heart sounds.   Pulmonary:      Effort: Pulmonary effort is normal.      Breath sounds: Normal breath sounds.   Abdominal:      General: Abdomen is flat. Bowel sounds are normal. There is no distension.      Palpations: Abdomen is soft.      Tenderness: There is no abdominal tenderness. There is no guarding or rebound.   Genitourinary:     Comments: Floyd in place  Musculoskeletal:         General: No swelling.      Right lower leg: No edema.      Left lower leg: No edema.   Skin:     General: Skin is warm.      Coloration: Skin is not jaundiced.   Neurological:      General: No focal deficit present.      Mental Status: He is alert.      Comments: Oriented to person, intermittently to time and place  Follows commands appropriately  Strength 4/5 throughout  Sensation intact throughout   Psychiatric:         Mood and Affect: Mood normal.         Behavior: Behavior normal.       Significant Labs: All pertinent labs within the past 24 hours have been reviewed.    Significant Imaging: I have reviewed all pertinent imaging results/findings within the past 24 hours.

## 2022-05-06 NOTE — SUBJECTIVE & OBJECTIVE
Past Medical History:   Diagnosis Date    Antiphospholipid syndrome 11/19/2021    Dysphagia     Hx of colonic polyps     followed by GI. Dr. Pham    Hyperlipidemia LDL goal <100     Overweight(278.02)     Prostate cancer september 2011    followed by urology, Dr. Rogers    Type II or unspecified type diabetes mellitus without mention of complication, not stated as uncontrolled     diet controlled       Past Surgical History:   Procedure Laterality Date    BRONCHOSCOPY N/A 10/15/2018    Procedure: BRONCHOSCOPY;  Surgeon: [facility];  Location: [facility] OR [address] [place] [address];  Service: Anesthesiology;  Laterality: N/A;    CATARACT EXTRACTION, BILATERAL  2014    POSTERIOR CERVICAL LAMINECTOMY Bilateral 4/28/2022    Procedure: LAMINECTOMY, SPINE, CERVICAL, POSTERIOR APPROACH C3-6;  Surgeon: Carlos Miller MD;  Location: [facility] OR [address] [place] [address];  Service: Neurosurgery;  Laterality: Bilateral;    PROSTATECTOMY         Review of patient's allergies indicates:  No Known Allergies    Medications:  Medications Prior to Admission   Medication Sig    albuterol (PROVENTIL/VENTOLIN HFA) 90 mcg/actuation inhaler INHALE 2 PUFFS BY MOUTH EVERY 6 HOURS AS NEEDED FOR WHEEZING    ascorbic acid, vitamin C, (VITAMIN C) 1000 MG tablet Take 1,000 mg by mouth once daily.    aspirin (ECOTRIN) 81 MG EC tablet Take 81 mg by mouth.    b complex vitamins capsule Take 1 capsule by mouth once daily.    ethambutoL (MYAMBUTOL) 400 MG Tab Take 2 tablets (800 mg total) by mouth once daily.    mucus clearing device (AEROBIKA OSCILLATING PEP SYSTM) by Misc.(Non-Drug; Combo Route) route 2 (two) times a day.    nebulizer and compressor (OMBRA COMPRESSOR SYSTEM) Marcy use to administer nebulized medication as directed    omega 3-dha-epa-fish oil 1,000 (120-180) mg Cap Take 1 capsule by mouth once daily.    omega-3 fatty acids 1,000 mg Cap Take 2 g by mouth.    omeprazole 20 mg TbEC 1/2 tablet daily in am as needed.    pravastatin (PRAVACHOL) 10 MG tablet Take 1  tablet (10 mg total) by mouth once daily.    promethazine-dextromethorphan (PROMETHAZINE-DM) 6.25-15 mg/5 mL Syrp Take 10-15ml by mouth every 8 hours as needed for coughing    sodium chloride 3% 3 % nebulizer solution USE 4 ML VIAL IN NEBULIZER  TWICE DAILY    warfarin (COUMADIN) 3 MG tablet Take 1.5 tablets (4.5 mg total) by mouth Daily. As directed by coumadin clinic     Antibiotics (From admission, onward)                Start     Stop Route Frequency Ordered    05/05/22 1600  cefepime in dextrose 5 % 1 gram/50 mL IVPB 1 g         -- IV Every 8 hours (non-standard times) 05/05/22 1251    05/05/22 1400  metroNIDAZOLE tablet 500 mg         -- Oral Every 8 hours 05/05/22 1251    04/28/22 0900  ethambutoL tablet 800 mg         -- Oral Daily 04/28/22 0601    04/28/22 0900  azithromycin tablet 500 mg         -- Oral Daily 04/28/22 0601          Antifungals (From admission, onward)                None          Antivirals (From admission, onward)      None             Immunization History   Administered Date(s) Administered    COVID-19, MRNA, LN-S, PF (Pfizer) (Purple Cap) 01/13/2021, 04/28/2021, 11/05/2021    Influenza (FLUAD) - Trivalent - Adjuvanted - PF (65+) 09/25/2019    Influenza - High Dose - PF (65 years and older) 09/18/2015, 10/04/2016, 10/23/2017, 09/21/2018    Influenza - Quadrivalent - High Dose - PF (65 years and older) 11/05/2021    Influenza - Quadrivalent - PF *Preferred* (6 months and older) 10/06/2014    Influenza A (H1N1) 2009 Monovalent - IM 01/22/2010    Influenza Split 10/10/2011, 10/06/2014    Pneumococcal Conjugate - 13 Valent 10/28/2015    Pneumococcal Polysaccharide - 23 Valent 07/12/2013    Tdap 03/08/2015    Zoster Recombinant 09/25/2019, 12/30/2019       Family History       Problem Relation (Age of Onset)    Colon cancer Mother    Dementia Brother    Diabetes Mother, Sister, , Brother, Brother, Maternal Aunt    Heart disease Father    Lung cancer Brother    Mental illness Sister     Myasthenia gravis Brother    Other Brother    Parkinsonism Brother    Pulmonary fibrosis Brother          Social History     Socioeconomic History    Marital status:     Number of children: 2   Occupational History    Occupation: tool    Tobacco Use    Smoking status: Former Smoker     Packs/day: 0.25     Years: 5.00     Pack years: 1.25     Quit date: 10/2/1962     Years since quittin.6    Smokeless tobacco: Never Used    Tobacco comment: quit age 23   Substance and Sexual Activity    Alcohol use: Yes     Comment: beer occasionally    Drug use: No    Sexual activity: Yes     Partners: Female     Review of Systems   Constitutional:  Negative for chills, fatigue, fever and unexpected weight change.   HENT:  Negative for congestion, postnasal drip and trouble swallowing.    Respiratory:  Negative for cough, chest tightness and shortness of breath.    Cardiovascular:  Negative for chest pain, palpitations and leg swelling.   Gastrointestinal:  Negative for abdominal pain, blood in stool, constipation, diarrhea, nausea and vomiting.   Genitourinary:  Negative for difficulty urinating, dysuria and hematuria.   Musculoskeletal:  Positive for neck stiffness. Negative for arthralgias and back pain.   Neurological:  Negative for dizziness and light-headedness.   Psychiatric/Behavioral:  Negative for confusion.    Objective:     Vital Signs (Most Recent):  Temp: 98.6 °F (37 °C) (22 1119)  Pulse: (!) 55 (22 1212)  Resp: 17 (22 1212)  BP: 135/61 (22 1119)  SpO2: 98 % (22 1212)   Vital Signs (24h Range):  Temp:  [98 °F (36.7 °C)-98.6 °F (37 °C)] 98.6 °F (37 °C)  Pulse:  [51-77] 55  Resp:  [16-18] 17  SpO2:  [95 %-98 %] 98 %  BP: (105-145)/(57-87) 135/61     Weight: 74.8 kg (165 lb)  Body mass index is 24.37 kg/m².    Estimated Creatinine Clearance: 85.6 mL/min (based on SCr of 0.7 mg/dL).    Physical Exam  Constitutional:       General: He is not in acute distress.      Appearance: Normal appearance. He is not ill-appearing.   Neck:      Comments: Wearing cervical collar   Cardiovascular:      Rate and Rhythm: Normal rate and regular rhythm.      Pulses: Normal pulses.      Heart sounds: Normal heart sounds. No murmur heard.    No friction rub. No gallop.   Pulmonary:      Effort: Pulmonary effort is normal. No respiratory distress.      Breath sounds: Normal breath sounds.   Abdominal:      General: Abdomen is flat. Bowel sounds are normal. There is no distension.      Palpations: Abdomen is soft.      Tenderness: There is no abdominal tenderness.   Skin:     General: Skin is warm and dry.   Neurological:      Mental Status: He is alert.       Significant Labs: All pertinent labs within the past 24 hours have been reviewed.    Significant Imaging: I have reviewed all pertinent imaging results/findings within the past 24 hours.

## 2022-05-06 NOTE — CONSULTS
[facility] ([facility])  Infectious Disease  Consult Note    Patient Name: Diego Gunter  MRN: [id number]  Admission Date: 4/28/2022  Hospital Length of Stay: 8 days  Attending Physician: Carlos Miller MD  Primary Care Provider: Portia Ferreira MD     Isolation Status: No active isolations    Patient information was obtained from patient, spouse/SO, relative(s) and ER records.      Inpatient consult to Infectious Diseases  Consult performed by: Chico No DO  Consult ordered by: Marilee Mcginnis MD        Assessment/Plan:     * Epidural hematoma  81 year old male with acute R sided weakness found to have C3-C6 compressive fluid collection on MRI. Underwent spinal decompression and fluid evacuation with NSGY 4/28. ID consulted for management of epidural hematoma vs abscess. Pathology reports fibrin thrombus/blood clot. No mention of concern for infection. Neurosurgery with low concern for infection at time of surgery. Primary team has reached out to micro lab to see if a sample can be sent for culture. However, patient afebrile with downtrending white count and no acute symptoms on exam. Reports feeling well with no new concerns since surgery. Blood cultures collected 5/3 with NGTD.     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 Rao in ID clinic       Thank you for your consult. I will sign off. Please contact us if you have any additional questions.    Chico No DO  Infectious Disease  [facility] ([facility])    Subjective:     Principal Problem: Epidural hematoma    HPI: Mr. Gunter is a 81 year old male with PMH significant for MAC PNA, followed in clinic by Dr. Rao, on ethambutol and  azithromycin History also significant for antiphospholipid syndrome, PE on chronic AC and DM2. Was admitted from ED to neuro critical care for CTH showing compressive fluid collection from C3-C6 after presenting to ED with R sided hemiparesis. MRI of cervical spine confirming these findings. NSGY consulted for C3-C6 decompressive surgery. Primary team has patient on azithromycin and cefazolin for empiric treatment. Blood cultures pending. ID consulted for further workup and management of epidural abscess.               Past Medical History:   Diagnosis Date    Antiphospholipid syndrome 11/19/2021    Dysphagia     Hx of colonic polyps     followed by GI. Dr. Pham    Hyperlipidemia LDL goal <100     Overweight(278.02)     Prostate cancer september 2011    followed by urology, Dr. Rogers    Type II or unspecified type diabetes mellitus without mention of complication, not stated as uncontrolled     diet controlled       Past Surgical History:   Procedure Laterality Date    BRONCHOSCOPY N/A 10/15/2018    Procedure: BRONCHOSCOPY;  Surgeon: [facility] Provider;  Location: [facility] OR [address] [place] [address];  Service: Anesthesiology;  Laterality: N/A;    CATARACT EXTRACTION, BILATERAL  2014    POSTERIOR CERVICAL LAMINECTOMY Bilateral 4/28/2022    Procedure: LAMINECTOMY, SPINE, CERVICAL, POSTERIOR APPROACH C3-6;  Surgeon: Carlos Miller MD;  Location: [facility] OR [address] [place] [address];  Service: Neurosurgery;  Laterality: Bilateral;    PROSTATECTOMY         Review of patient's allergies indicates:  No Known Allergies    Medications:  Medications Prior to Admission   Medication Sig    albuterol (PROVENTIL/VENTOLIN HFA) 90 mcg/actuation inhaler INHALE 2 PUFFS BY MOUTH EVERY 6 HOURS AS NEEDED FOR WHEEZING    ascorbic acid, vitamin C, (VITAMIN C) 1000 MG tablet Take 1,000 mg by mouth once daily.    aspirin (ECOTRIN) 81 MG EC tablet Take 81 mg by mouth.    b complex vitamins capsule Take 1 capsule by mouth once daily.    ethambutoL  (MYAMBUTOL) 400 MG Tab Take 2 tablets (800 mg total) by mouth once daily.    mucus clearing device (AEROBIKA OSCILLATING PEP SYSTM) by Misc.(Non-Drug; Combo Route) route 2 (two) times a day.    nebulizer and compressor (OMNATURE'S WAY GARDEN HOUSE COMPRESSOR SYSTEM) Marcy use to administer nebulized medication as directed    omega 3-dha-epa-fish oil 1,000 (120-180) mg Cap Take 1 capsule by mouth once daily.    omega-3 fatty acids 1,000 mg Cap Take 2 g by mouth.    omeprazole 20 mg TbEC 1/2 tablet daily in am as needed.    pravastatin (PRAVACHOL) 10 MG tablet Take 1 tablet (10 mg total) by mouth once daily.    promethazine-dextromethorphan (PROMETHAZINE-DM) 6.25-15 mg/5 mL Syrp Take 10-15ml by mouth every 8 hours as needed for coughing    sodium chloride 3% 3 % nebulizer solution USE 4 ML VIAL IN NEBULIZER  TWICE DAILY    warfarin (COUMADIN) 3 MG tablet Take 1.5 tablets (4.5 mg total) by mouth Daily. As directed by coumadin clinic     Antibiotics (From admission, onward)                Start     Stop Route Frequency Ordered    05/05/22 1600  cefepime in dextrose 5 % 1 gram/50 mL IVPB 1 g         -- IV Every 8 hours (non-standard times) 05/05/22 1251    05/05/22 1400  metroNIDAZOLE tablet 500 mg         -- Oral Every 8 hours 05/05/22 1251    04/28/22 0900  ethambutoL tablet 800 mg         -- Oral Daily 04/28/22 0601    04/28/22 0900  azithromycin tablet 500 mg         -- Oral Daily 04/28/22 0601          Antifungals (From admission, onward)                None          Antivirals (From admission, onward)      None             Immunization History   Administered Date(s) Administered    COVID-19, MRNA, LN-S, PF (Pfizer) (Purple Cap) 01/13/2021, 04/28/2021, 11/05/2021    Influenza (FLUAD) - Trivalent - Adjuvanted - PF (65+) 09/25/2019    Influenza - High Dose - PF (65 years and older) 09/18/2015, 10/04/2016, 10/23/2017, 09/21/2018    Influenza - Quadrivalent - High Dose - PF (65 years and older) 11/05/2021    Influenza -  Quadrivalent - PF *Preferred* (6 months and older) 10/06/2014    Influenza A (H1N1) 2009 Monovalent - IM 2010    Influenza Split 10/10/2011, 10/06/2014    Pneumococcal Conjugate - 13 Valent 10/28/2015    Pneumococcal Polysaccharide - 23 Valent 2013    Tdap 2015    Zoster Recombinant 2019, 2019       Family History       Problem Relation (Age of Onset)    Colon cancer Mother    Dementia Brother    Diabetes Mother, Sister, , Brother, Brother, Maternal Aunt    Heart disease Father    Lung cancer Brother    Mental illness Sister    Myasthenia gravis Brother    Other Brother    Parkinsonism Brother    Pulmonary fibrosis Brother          Social History     Socioeconomic History    Marital status:     Number of children: 2   Occupational History    Occupation: tool    Tobacco Use    Smoking status: Former Smoker     Packs/day: 0.25     Years: 5.00     Pack years: 1.25     Quit date: 10/2/1962     Years since quittin.6    Smokeless tobacco: Never Used    Tobacco comment: quit age 23   Substance and Sexual Activity    Alcohol use: Yes     Comment: beer occasionally    Drug use: No    Sexual activity: Yes     Partners: Female     Review of Systems   Constitutional:  Negative for chills, fatigue, fever and unexpected weight change.   HENT:  Negative for congestion, postnasal drip and trouble swallowing.    Respiratory:  Negative for cough, chest tightness and shortness of breath.    Cardiovascular:  Negative for chest pain, palpitations and leg swelling.   Gastrointestinal:  Negative for abdominal pain, blood in stool, constipation, diarrhea, nausea and vomiting.   Genitourinary:  Negative for difficulty urinating, dysuria and hematuria.   Musculoskeletal:  Positive for neck stiffness. Negative for arthralgias and back pain.   Neurological:  Negative for dizziness and light-headedness.   Psychiatric/Behavioral:  Negative for confusion.    Objective:     Vital  Signs (Most Recent):  Temp: 98.6 °F (37 °C) (05/06/22 1119)  Pulse: (!) 55 (05/06/22 1212)  Resp: 17 (05/06/22 1212)  BP: 135/61 (05/06/22 1119)  SpO2: 98 % (05/06/22 1212)   Vital Signs (24h Range):  Temp:  [98 °F (36.7 °C)-98.6 °F (37 °C)] 98.6 °F (37 °C)  Pulse:  [51-77] 55  Resp:  [16-18] 17  SpO2:  [95 %-98 %] 98 %  BP: (105-145)/(57-87) 135/61     Weight: 74.8 kg (165 lb)  Body mass index is 24.37 kg/m².    Estimated Creatinine Clearance: 85.6 mL/min (based on SCr of 0.7 mg/dL).    Physical Exam  Constitutional:       General: He is not in acute distress.     Appearance: Normal appearance. He is not ill-appearing.   Neck:      Comments: Wearing cervical collar   Cardiovascular:      Rate and Rhythm: Normal rate and regular rhythm.      Pulses: Normal pulses.      Heart sounds: Normal heart sounds. No murmur heard.    No friction rub. No gallop.   Pulmonary:      Effort: Pulmonary effort is normal. No respiratory distress.      Breath sounds: Normal breath sounds.   Abdominal:      General: Abdomen is flat. Bowel sounds are normal. There is no distension.      Palpations: Abdomen is soft.      Tenderness: There is no abdominal tenderness.   Skin:     General: Skin is warm and dry.   Neurological:      Mental Status: He is alert.       Significant Labs: All pertinent labs within the past 24 hours have been reviewed.    Significant Imaging: I have reviewed all pertinent imaging results/findings within the past 24 hours.

## 2022-05-06 NOTE — PROGRESS NOTES
"[facility] ([facility])  Adult Nutrition  Progress Note    SUMMARY       Recommendations    1. Suggest diabetic diet with texture per SLP     2. Add Boost Pudding BID to aid in caloric intake     3. RD following    Goals: Pt will meet and tolerate >75% EEN/EPN by RD f/u  Nutrition Goal Status: new  Communication of RD Recs:  (POC)    Assessment and Plan    Nutrition Problem  Inadequate oral intake     Related to (etiology):   Swallowing difficulties     Signs and Symptoms (as evidenced by):   Dysphagia diagnosis per SLP      Interventions (treatment strategy):  Collaboration of nutrition care with other providers     Nutrition Diagnosis Status:   Improving     Reason for Assessment    Reason For Assessment: RD follow-up  Diagnosis:  (epidural hematoma)  Relevant Medical History: Prostate CA, T2DM, HLD  Interdisciplinary Rounds: did not attend  General Information Comments: Pt continues with intermittent confusion. Pt with fair po intake, consuming 50-75% of meals. No N/V reported. Visual NFPE completed , pt nourished.   Nutrition Discharge Planning: Adequate intake on Low Na diet    Nutrition Risk Screen    Nutrition Risk Screen: dysphagia or difficulty swallowing    Nutrition/Diet History    Patient Reported Diet/Restrictions/Preferences: heart healthy  Spiritual, Cultural Beliefs, Advent Practices, Values that Affect Care: no  Food Allergies: NKFA  Factors Affecting Nutritional Intake: decreased appetite    Anthropometrics    Temp: 98.2 °F (36.8 °C)  Height Method: Estimated  Height: 5' 9" (175.3 cm)  Height (inches): 69 in  Weight Method: Bed Scale  Weight: 74.8 kg (165 lb)  Weight (lb): 165 lb  Ideal Body Weight (IBW), Male: 160 lb  % Ideal Body Weight, Male (lb): 103.13 %  BMI (Calculated): 24.4  BMI Grade: 18.5-24.9 - normal  Usual Body Weight (UBW), k kg  % Usual Body Weight: 100     Lab/Procedures/Meds    Pertinent Labs Reviewed: reviewed  Pertinent Labs Comments: glucose " 122  Pertinent Medications Reviewed: reviewed  Pertinent Medications Comments: acetaminophen, famotidine, heparin, morphine, polyethylene glycol, statin, senna, lactated ringers     Estimated/Assessed Needs    Weight Used For Calorie Calculations: 74.8 kg (165 lb)  Energy Calorie Requirements (kcal): 4165-5714 kcal/day  Energy Need Method: Frio-St Jeor (x 1.25)  Protein Requirements: 70-85 g/day (1.0-1.2 g/kg)  Weight Used For Protein Calculations: 70.3 kg (155 lb)  Fluid Requirements (mL): 1 mL/kcal or per MD  Estimated Fluid Requirement Method: RDA Method  RDA Method (mL): 1454  CHO Requirement: 227 g/day    Nutrition Prescription Ordered    Current Diet Order: Regular  Nutrition Order Comments: Nectar Thick Liquids    Evaluation of Received Nutrient/Fluid Intake    I/O: -6.35L x 24 hrs, -21.346L since admit   Energy Calories Required: not meeting needs  Protein Required: not meeting needs  Comments: LBM 5/3  Tolerance:  (No PO intake yet)  % Intake of Estimated Energy Needs: 50 - 75 %  % Meal Intake: 50 - 75 %    Nutrition Risk    Level of Risk/Frequency of Follow-up: low     Monitor and Evaluation    Food and Nutrient Intake: energy intake, food and beverage intake  Food and Nutrient Adminstration: diet order  Knowledge/Beliefs/Attitudes: food and nutrition knowledge/skill  Physical Activity and Function: nutrition-related ADLs and IADLs  Anthropometric Measurements: weight change, weight  Biochemical Data, Medical Tests and Procedures: gastrointestinal profile, inflammatory profile, glucose/endocrine profile, electrolyte and renal panel, lipid profile  Nutrition-Focused Physical Findings: overall appearance     Nutrition Follow-Up    RD Follow-up?: Yes

## 2022-05-07 LAB
ALBUMIN SERPL BCP-MCNC: 3.4 G/DL (ref 3.5–5.2)
ALP SERPL-CCNC: 66 U/L (ref 55–135)
ALT SERPL W/O P-5'-P-CCNC: 44 U/L (ref 10–44)
ANION GAP SERPL CALC-SCNC: 10 MMOL/L (ref 8–16)
APTT BLDCRRT: 28.8 SEC (ref 21–32)
AST SERPL-CCNC: 33 U/L (ref 10–40)
BASOPHILS # BLD AUTO: 0.06 K/UL (ref 0–0.2)
BASOPHILS NFR BLD: 0.4 % (ref 0–1.9)
BILIRUB SERPL-MCNC: 0.6 MG/DL (ref 0.1–1)
BUN SERPL-MCNC: 12 MG/DL (ref 8–23)
CALCIUM SERPL-MCNC: 9 MG/DL (ref 8.7–10.5)
CHLORIDE SERPL-SCNC: 112 MMOL/L (ref 95–110)
CO2 SERPL-SCNC: 25 MMOL/L (ref 23–29)
CREAT SERPL-MCNC: 0.8 MG/DL (ref 0.5–1.4)
DIFFERENTIAL METHOD: ABNORMAL
EOSINOPHIL # BLD AUTO: 0 K/UL (ref 0–0.5)
EOSINOPHIL NFR BLD: 0.1 % (ref 0–8)
ERYTHROCYTE [DISTWIDTH] IN BLOOD BY AUTOMATED COUNT: 15.9 % (ref 11.5–14.5)
EST. GFR  (AFRICAN AMERICAN): >60 ML/MIN/1.73 M^2
EST. GFR  (NON AFRICAN AMERICAN): >60 ML/MIN/1.73 M^2
GLUCOSE SERPL-MCNC: 108 MG/DL (ref 70–110)
HCT VFR BLD AUTO: 34.8 % (ref 40–54)
HGB BLD-MCNC: 11.1 G/DL (ref 14–18)
IMM GRANULOCYTES # BLD AUTO: 0.69 K/UL (ref 0–0.04)
IMM GRANULOCYTES NFR BLD AUTO: 4.2 % (ref 0–0.5)
INR PPP: 1.1 (ref 0.8–1.2)
LYMPHOCYTES # BLD AUTO: 1.7 K/UL (ref 1–4.8)
LYMPHOCYTES NFR BLD: 10.3 % (ref 18–48)
MAGNESIUM SERPL-MCNC: 1.8 MG/DL (ref 1.6–2.6)
MCH RBC QN AUTO: 28 PG (ref 27–31)
MCHC RBC AUTO-ENTMCNC: 31.9 G/DL (ref 32–36)
MCV RBC AUTO: 88 FL (ref 82–98)
MONOCYTES # BLD AUTO: 1.7 K/UL (ref 0.3–1)
MONOCYTES NFR BLD: 10.4 % (ref 4–15)
NEUTROPHILS # BLD AUTO: 12.4 K/UL (ref 1.8–7.7)
NEUTROPHILS NFR BLD: 74.6 % (ref 38–73)
NRBC BLD-RTO: 0 /100 WBC
PHOSPHATE SERPL-MCNC: 2.9 MG/DL (ref 2.7–4.5)
PLATELET # BLD AUTO: 237 K/UL (ref 150–450)
PMV BLD AUTO: 11.6 FL (ref 9.2–12.9)
POCT GLUCOSE: 123 MG/DL (ref 70–110)
POCT GLUCOSE: 133 MG/DL (ref 70–110)
POCT GLUCOSE: 162 MG/DL (ref 70–110)
POCT GLUCOSE: 165 MG/DL (ref 70–110)
POTASSIUM SERPL-SCNC: 3.8 MMOL/L (ref 3.5–5.1)
PROT SERPL-MCNC: 6.3 G/DL (ref 6–8.4)
PROTHROMBIN TIME: 11.3 SEC (ref 9–12.5)
RBC # BLD AUTO: 3.97 M/UL (ref 4.6–6.2)
SODIUM SERPL-SCNC: 142 MMOL/L (ref 136–145)
SODIUM SERPL-SCNC: 147 MMOL/L (ref 136–145)
WBC # BLD AUTO: 16.6 K/UL (ref 3.9–12.7)

## 2022-05-07 PROCEDURE — 11000001 HC ACUTE MED/SURG PRIVATE ROOM

## 2022-05-07 PROCEDURE — 99024 POSTOP FOLLOW-UP VISIT: CPT | Mod: ,,, | Performed by: PHYSICIAN ASSISTANT

## 2022-05-07 PROCEDURE — 99233 PR SUBSEQUENT HOSPITAL CARE,LEVL III: ICD-10-PCS | Mod: GC,,, | Performed by: HOSPITALIST

## 2022-05-07 PROCEDURE — 25000003 PHARM REV CODE 250: Performed by: STUDENT IN AN ORGANIZED HEALTH CARE EDUCATION/TRAINING PROGRAM

## 2022-05-07 PROCEDURE — 25000242 PHARM REV CODE 250 ALT 637 W/ HCPCS: Performed by: STUDENT IN AN ORGANIZED HEALTH CARE EDUCATION/TRAINING PROGRAM

## 2022-05-07 PROCEDURE — 99233 SBSQ HOSP IP/OBS HIGH 50: CPT | Mod: GC,,, | Performed by: HOSPITALIST

## 2022-05-07 PROCEDURE — 94640 AIRWAY INHALATION TREATMENT: CPT

## 2022-05-07 PROCEDURE — 99024 PR POST-OP FOLLOW-UP VISIT: ICD-10-PCS | Mod: ,,, | Performed by: PHYSICIAN ASSISTANT

## 2022-05-07 PROCEDURE — 63600175 PHARM REV CODE 636 W HCPCS: Performed by: STUDENT IN AN ORGANIZED HEALTH CARE EDUCATION/TRAINING PROGRAM

## 2022-05-07 PROCEDURE — 93010 EKG 12-LEAD: ICD-10-PCS | Mod: ,,, | Performed by: INTERNAL MEDICINE

## 2022-05-07 PROCEDURE — 63600175 PHARM REV CODE 636 W HCPCS

## 2022-05-07 PROCEDURE — 93010 ELECTROCARDIOGRAM REPORT: CPT | Mod: ,,, | Performed by: INTERNAL MEDICINE

## 2022-05-07 PROCEDURE — 85730 THROMBOPLASTIN TIME PARTIAL: CPT | Performed by: STUDENT IN AN ORGANIZED HEALTH CARE EDUCATION/TRAINING PROGRAM

## 2022-05-07 PROCEDURE — 36415 COLL VENOUS BLD VENIPUNCTURE: CPT

## 2022-05-07 PROCEDURE — 80053 COMPREHEN METABOLIC PANEL: CPT | Performed by: STUDENT IN AN ORGANIZED HEALTH CARE EDUCATION/TRAINING PROGRAM

## 2022-05-07 PROCEDURE — 94761 N-INVAS EAR/PLS OXIMETRY MLT: CPT

## 2022-05-07 PROCEDURE — 63600175 PHARM REV CODE 636 W HCPCS: Performed by: PHYSICIAN ASSISTANT

## 2022-05-07 PROCEDURE — 85025 COMPLETE CBC W/AUTO DIFF WBC: CPT | Performed by: STUDENT IN AN ORGANIZED HEALTH CARE EDUCATION/TRAINING PROGRAM

## 2022-05-07 PROCEDURE — 99900035 HC TECH TIME PER 15 MIN (STAT)

## 2022-05-07 PROCEDURE — 93005 ELECTROCARDIOGRAM TRACING: CPT

## 2022-05-07 PROCEDURE — 94668 MNPJ CHEST WALL SBSQ: CPT

## 2022-05-07 PROCEDURE — 25000003 PHARM REV CODE 250: Performed by: PHYSICIAN ASSISTANT

## 2022-05-07 PROCEDURE — 63700000 PHARM REV CODE 250 ALT 637 W/O HCPCS: Performed by: STUDENT IN AN ORGANIZED HEALTH CARE EDUCATION/TRAINING PROGRAM

## 2022-05-07 PROCEDURE — 84100 ASSAY OF PHOSPHORUS: CPT | Performed by: STUDENT IN AN ORGANIZED HEALTH CARE EDUCATION/TRAINING PROGRAM

## 2022-05-07 PROCEDURE — 25000003 PHARM REV CODE 250

## 2022-05-07 PROCEDURE — 83735 ASSAY OF MAGNESIUM: CPT | Performed by: STUDENT IN AN ORGANIZED HEALTH CARE EDUCATION/TRAINING PROGRAM

## 2022-05-07 PROCEDURE — 85610 PROTHROMBIN TIME: CPT | Performed by: STUDENT IN AN ORGANIZED HEALTH CARE EDUCATION/TRAINING PROGRAM

## 2022-05-07 PROCEDURE — 84295 ASSAY OF SERUM SODIUM: CPT

## 2022-05-07 RX ORDER — QUETIAPINE FUMARATE 25 MG/1
25 TABLET, FILM COATED ORAL NIGHTLY
Status: DISCONTINUED | OUTPATIENT
Start: 2022-05-07 | End: 2022-05-09

## 2022-05-07 RX ADMIN — Medication 9 MG: at 10:05

## 2022-05-07 RX ADMIN — DEXAMETHASONE 2 MG: 1 TABLET ORAL at 05:05

## 2022-05-07 RX ADMIN — ACETAMINOPHEN 1000 MG: 500 TABLET ORAL at 10:05

## 2022-05-07 RX ADMIN — PRAVASTATIN SODIUM 10 MG: 10 TABLET ORAL at 09:05

## 2022-05-07 RX ADMIN — SENNOSIDES 8.6 MG: 8.6 TABLET ORAL at 10:05

## 2022-05-07 RX ADMIN — FAMOTIDINE 20 MG: 20 TABLET ORAL at 10:05

## 2022-05-07 RX ADMIN — IPRATROPIUM BROMIDE AND ALBUTEROL SULFATE 3 ML: 2.5; .5 SOLUTION RESPIRATORY (INHALATION) at 07:05

## 2022-05-07 RX ADMIN — SILODOSIN 4 MG: 4 CAPSULE ORAL at 09:05

## 2022-05-07 RX ADMIN — HEPARIN SODIUM 5000 UNITS: 5000 INJECTION INTRAVENOUS; SUBCUTANEOUS at 10:05

## 2022-05-07 RX ADMIN — SODIUM CHLORIDE SOLN NEBU 3% 4 ML: 3 NEBU SOLN at 10:05

## 2022-05-07 RX ADMIN — AZITHROMYCIN MONOHYDRATE 500 MG: 250 TABLET ORAL at 09:05

## 2022-05-07 RX ADMIN — QUETIAPINE FUMARATE 25 MG: 25 TABLET ORAL at 10:05

## 2022-05-07 RX ADMIN — SODIUM CHLORIDE, SODIUM LACTATE, POTASSIUM CHLORIDE, AND CALCIUM CHLORIDE: 600; 310; 30; 20 INJECTION, SOLUTION INTRAVENOUS at 06:05

## 2022-05-07 RX ADMIN — ACETAMINOPHEN 1000 MG: 500 TABLET ORAL at 02:05

## 2022-05-07 RX ADMIN — IPRATROPIUM BROMIDE AND ALBUTEROL SULFATE 3 ML: 2.5; .5 SOLUTION RESPIRATORY (INHALATION) at 02:05

## 2022-05-07 RX ADMIN — ACETAMINOPHEN 1000 MG: 500 TABLET ORAL at 09:05

## 2022-05-07 RX ADMIN — SENNOSIDES 8.6 MG: 8.6 TABLET ORAL at 09:05

## 2022-05-07 RX ADMIN — OXYCODONE 5 MG: 5 TABLET ORAL at 10:05

## 2022-05-07 RX ADMIN — SODIUM CHLORIDE, SODIUM LACTATE, POTASSIUM CHLORIDE, AND CALCIUM CHLORIDE: 600; 310; 30; 20 INJECTION, SOLUTION INTRAVENOUS at 05:05

## 2022-05-07 RX ADMIN — SODIUM CHLORIDE SOLN NEBU 3% 4 ML: 3 NEBU SOLN at 09:05

## 2022-05-07 RX ADMIN — HEPARIN SODIUM 5000 UNITS: 5000 INJECTION INTRAVENOUS; SUBCUTANEOUS at 02:05

## 2022-05-07 RX ADMIN — LIDOCAINE 1 PATCH: 50 PATCH CUTANEOUS at 11:05

## 2022-05-07 RX ADMIN — FAMOTIDINE 20 MG: 20 TABLET ORAL at 09:05

## 2022-05-07 RX ADMIN — HEPARIN SODIUM 5000 UNITS: 5000 INJECTION INTRAVENOUS; SUBCUTANEOUS at 05:05

## 2022-05-07 RX ADMIN — POLYETHYLENE GLYCOL 3350 17 G: 17 POWDER, FOR SOLUTION ORAL at 09:05

## 2022-05-07 RX ADMIN — IPRATROPIUM BROMIDE AND ALBUTEROL SULFATE 3 ML: 2.5; .5 SOLUTION RESPIRATORY (INHALATION) at 08:05

## 2022-05-07 RX ADMIN — IPRATROPIUM BROMIDE AND ALBUTEROL SULFATE 3 ML: 2.5; .5 SOLUTION RESPIRATORY (INHALATION) at 12:05

## 2022-05-07 RX ADMIN — DEXAMETHASONE 2 MG: 1 TABLET ORAL at 11:05

## 2022-05-07 RX ADMIN — ETHAMBUTOL HYDROCHLORIDE 800 MG: 400 TABLET, FILM COATED ORAL at 09:05

## 2022-05-07 NOTE — SUBJECTIVE & OBJECTIVE
Interval History: NAEON. Afebrile, HD stable. Pt with >6L UOP in the last 24hrs. Encourage enteral water intake. Waxing/waning confusion improving.    Review of Systems   Constitutional:  Positive for fatigue. Negative for chills and fever.   HENT:  Negative for congestion and sore throat.    Respiratory:  Negative for cough and shortness of breath.    Cardiovascular:  Negative for chest pain, palpitations and leg swelling.   Gastrointestinal:  Negative for abdominal distention, abdominal pain, constipation, diarrhea, nausea and vomiting.   Genitourinary:  Negative for dysuria and frequency.   Musculoskeletal:  Negative for back pain and myalgias.   Skin:  Negative for color change and rash.   Neurological:  Negative for light-headedness and headaches.   Psychiatric/Behavioral:  Negative for confusion and hallucinations.    Objective:     Vital Signs (Most Recent):  Temp: 97.6 °F (36.4 °C) (05/07/22 0400)  Pulse: 83 (05/07/22 0700)  Resp: 18 (05/07/22 0400)  BP: (!) 142/71 (05/07/22 0400)  SpO2: 99 % (05/07/22 0400)   Vital Signs (24h Range):  Temp:  [97 °F (36.1 °C)-98.6 °F (37 °C)] 97.6 °F (36.4 °C)  Pulse:  [53-83] 83  Resp:  [16-18] 18  SpO2:  [96 %-99 %] 99 %  BP: (135-162)/(61-71) 142/71     Weight: 74.8 kg (165 lb)  Body mass index is 24.37 kg/m².    Intake/Output Summary (Last 24 hours) at 5/7/2022 0802  Last data filed at 5/6/2022 2200  Gross per 24 hour   Intake --   Output 4975 ml   Net -4975 ml      Physical Exam  Constitutional:       General: He is not in acute distress.     Appearance: He is not ill-appearing or toxic-appearing.   HENT:      Head: Normocephalic and atraumatic.      Nose: No congestion or rhinorrhea.      Mouth/Throat:      Pharynx: No oropharyngeal exudate or posterior oropharyngeal erythema.   Eyes:      General: No scleral icterus.     Conjunctiva/sclera: Conjunctivae normal.   Cardiovascular:      Rate and Rhythm: Normal rate and regular rhythm.      Pulses: Normal pulses.       Heart sounds: Normal heart sounds.   Pulmonary:      Effort: Pulmonary effort is normal.      Breath sounds: Normal breath sounds.   Abdominal:      General: Abdomen is flat. Bowel sounds are normal. There is no distension.      Palpations: Abdomen is soft.      Tenderness: There is no abdominal tenderness. There is no guarding or rebound.   Genitourinary:     Comments: Floyd in place  Musculoskeletal:         General: No swelling.      Right lower leg: No edema.      Left lower leg: No edema.   Skin:     General: Skin is warm.      Coloration: Skin is not jaundiced.   Neurological:      General: No focal deficit present.      Mental Status: He is alert.      Comments: Oriented to person, intermittently to time and place  Follows commands appropriately  Strength 4/5 throughout  Sensation intact throughout   Psychiatric:         Mood and Affect: Mood normal.         Behavior: Behavior normal.       Significant Labs: All pertinent labs within the past 24 hours have been reviewed.    Significant Imaging: I have reviewed all pertinent imaging results/findings within the past 24 hours.

## 2022-05-07 NOTE — PLAN OF CARE
Problem: Adult Inpatient Plan of Care  Goal: Plan of Care Review  Outcome: Ongoing, Progressing  Flowsheets (Taken 5/7/2022 1812)  Plan of Care Reviewed With:   patient   spouse     Problem: Fall Injury Risk  Goal: Absence of Fall and Fall-Related Injury  Outcome: Ongoing, Progressing

## 2022-05-07 NOTE — NURSING
Isidra DAVID; spoke with  about patient becoming very agitated and combative with the sitter at bedside. Dr. Perry ordered bilateral wrist restraints. No other orders at this time.

## 2022-05-07 NOTE — SUBJECTIVE & OBJECTIVE
Interval History: Agitation and confusion this AM. Restraints replaced as patient combative and struck sitter. Will wean restraints and encourage redirection from sitter and wife at bedside. Start nightly Seroquel. Strength stable.     Medications:  Continuous Infusions:   lactated ringers 125 mL/hr at 05/07/22 0602     Scheduled Meds:   acetaminophen  1,000 mg Oral TID    albuterol-ipratropium  3 mL Nebulization Q6H    azithromycin  500 mg Oral Daily    dexAMETHasone  2 mg Oral Q6H    Followed by    [START ON 5/9/2022] dexAMETHasone  2 mg Oral Q8H    Followed by    [START ON 5/10/2022] dexAMETHasone  2 mg Oral Q12H    Followed by    [START ON 5/13/2022] dexAMETHasone  2 mg Oral Daily    ethambutoL  800 mg Oral Daily    famotidine  20 mg Oral BID    heparin (porcine)  5,000 Units Subcutaneous Q8H    LIDOcaine  1 patch Transdermal Q24H    melatonin  9 mg Oral Nightly    morphine  2 mg Intravenous Once    polyethylene glycol  17 g Oral Daily    pravastatin  10 mg Oral Daily    QUEtiapine  25 mg Oral QHS    senna  8.6 mg Oral BID    silodosin  4 mg Oral Daily    sodium chloride 3%  4 mL Nebulization BID     PRN Meds:acetaminophen, dextrose 10%, dextrose 10%, glucagon (human recombinant), insulin aspart U-100, lactulose, ondansetron, oxyCODONE     Review of Systems  Objective:     Weight: 74.8 kg (165 lb)  Body mass index is 24.37 kg/m².  Vital Signs (Most Recent):  Temp: 98.3 °F (36.8 °C) (05/07/22 0805)  Pulse: 88 (05/07/22 0912)  Resp: 20 (05/07/22 0912)  BP: (!) 150/74 (05/07/22 0805)  SpO2: 99 % (05/07/22 0912)   Vital Signs (24h Range):  Temp:  [97 °F (36.1 °C)-98.6 °F (37 °C)] 98.3 °F (36.8 °C)  Pulse:  [53-88] 88  Resp:  [16-20] 20  SpO2:  [96 %-99 %] 99 %  BP: (135-162)/(61-74) 150/74                          Urethral Catheter 05/05/22 0200 Straight-tip;Non-latex (Active)   Site Assessment Clean;Intact 05/07/22 0400   Collection Container Standard drainage bag 05/07/22 0400   Securement Method secured to top of  "thigh w/ adhesive device 05/07/22 0400   Catheter Care Performed yes 05/07/22 0400   Reason for Continuing Urinary Catheterization Urinary retention 05/07/22 0400   CAUTI Prevention Bundle Securement Device in place with 1" slack;Intact seal between catheter & drainage tubing;Sheeting clip in use;Drainage bag/urimeter below bladder;Drainage bag/urimeter off the floor;No dependent loops or kinks;Drainage bag/urimeter not overfilled (<2/3 full) 05/06/22 2000   Output (mL) 2275 mL 05/06/22 1800     Neurosurgery Physical Exam  General: well developed, well nourished, no distress.   Head: normocephalic, atraumatic  Neurologic: Alert and oriented. Thought content somewhat appropriate. Some conversation hard to follow, patient with higher level confusion  Mental Status: Awake, Alert, Oriented x 3 (not oriented to year). Higher level confusion.   Language: No aphasia  Speech: No dysarthria  Cranial nerves: face symmetric, tongue midline, CN II-XII grossly intact.   Eyes: pupils equal, round, reactive to light with accommodation, EOMI.   Pulmonary: normal respirations, no signs of respiratory distress  Abdomen: soft,slightly distended  Skin: Skin is warm, dry and intact.  Sensory: intact to light touch throughout     Motor Strength:Moves all extremities spontaneously with good tone.  Full strength upper and lower extremities. No abnormal movements seen.      Strength   Deltoids Triceps Biceps Wrist Extension Wrist Flexion Hand    Upper: R 5/5 5/5 5/5 5/5 5/5 5/5     L 5/5 5/5 5/5 5/5 5/5 5/5       Iliopsoas Quadriceps Knee  Flexion Tibialis  anterior Gastro- cnemius EHL   Lower: R 5/5 5/5 5/5 5/5 5/5 5/5     L 5/5 5/5 5/5 5/5 5/5 5/5      Posterior cervical incision: Clean, dry, staples intact.     Significant Labs:  Recent Labs   Lab 05/06/22  0739 05/06/22 2000 05/07/22  0827   *  --  108     145 143 147*   K 3.6  --  3.8     --  112*   CO2 29  --  25   BUN 11  --  12   CREATININE 0.7  --  0.8 "   CALCIUM 8.9  --  9.0   MG 1.9  --  1.8     Recent Labs   Lab 05/06/22  0739 05/07/22  0828   WBC 13.87* 16.60*   HGB 10.4* 11.1*   HCT 33.6* 34.8*    237     Recent Labs   Lab 05/06/22  0739 05/07/22  0828   INR 1.1 1.1   APTT 35.5* 28.8     Microbiology Results (last 7 days)       Procedure Component Value Units Date/Time    Blood culture [[id number]] Collected: 05/03/22 1542    Order Status: Completed Specimen: Blood from Peripheral, Left Hand Updated: 05/06/22 1612     Blood Culture, Routine No Growth to date      No Growth to date      No Growth to date      No Growth to date    Narrative:      Collection has been rescheduled by [facility] at 05/03/2022 12:38 Reason:   Patient unavailable going yo ct will yry back  Collection has been rescheduled by [facility] at 05/03/2022 12:41 Reason:   Spe with sabino stanton Patient unavailable going to ct  Collection has been rescheduled by [facility] at 05/03/2022 14:03 Reason:   Patient unavailable in ultrasound sabino valenzuela said come back in an hr   Collection has been rescheduled by [facility] at 05/03/2022 12:38 Reason:   Patient unavailable going yo ct will yry back  Collection has been rescheduled by [facility] at 05/03/2022 12:41 Reason:   Spe with sabino stanton Patient unavailable going to ct  Collection has been rescheduled by [facility] at 05/03/2022 14:03 Reason:   Patient unavailable in ultrasound sabino valenzuela said come back in an hr     Blood culture [[id number]] Collected: 05/03/22 1543    Order Status: Completed Specimen: Blood from Peripheral, Right Hand Updated: 05/06/22 1612     Blood Culture, Routine No Growth to date      No Growth to date      No Growth to date      No Growth to date    Narrative:      Collection has been rescheduled by [facility] at 05/03/2022 12:38 Reason:   Patient unavailable going yo ct will yry back  Collection has been rescheduled by [facility] at 05/03/2022 12:41 Reason:   Spe with sabino stanton Patient unavailable going to ct  Collection has been rescheduled by [facility] at 05/03/2022 14:03 Reason:    Patient unavailable in ultrasound sabino valenzuela said come back in an hr   Collection has been rescheduled by [facility] at 05/03/2022 12:38 Reason:   Patient unavailable going yo ct will yry back  Collection has been rescheduled by [facility] at 05/03/2022 12:41 Reason:   Spe with rn romy Patient unavailable going to ct  Collection has been rescheduled by [facility] at 05/03/2022 14:03 Reason:   Patient unavailable in ultrasound saibno valenzuela said come back in an hr     Culture, Anaerobe [[id number]]     Order Status: Completed Specimen: Neck     Aerobic culture [[id number]]     Order Status: Completed Specimen: Neck     AFB Culture & Smear [[id number]]     Order Status: No result Specimen: Respiratory from Sputum, Expectorated     Culture, Respiratory with Gram Stain [[id number]]     Order Status: No result Specimen: Respiratory     Blood culture [[id number]] Collected: 04/28/22 1201    Order Status: Completed Specimen: Blood from Peripheral, Forearm, Right Updated: 05/02/22 1303     Blood Culture, Routine No Growth after 4 days.     Blood culture [[id number]] Collected: 04/28/22 1201    Order Status: Completed Specimen: Blood from Peripheral, Hand, Left Updated: 05/02/22 1303     Blood Culture, Routine No Growth after 4 days.           All pertinent labs from the last 24 hours have been reviewed.    Significant Diagnostics:  I have reviewed all pertinent imaging results/findings within the past 24 hours.

## 2022-05-07 NOTE — PLAN OF CARE
Problem: Adult Inpatient Plan of Care  Goal: Plan of Care Review  Outcome: Ongoing, Progressing  Goal: Optimal Comfort and Wellbeing  Outcome: Ongoing, Progressing     Problem: Adjustment to Illness (Stroke, Ischemic/Transient Ischemic Attack)  Goal: Optimal Coping  Outcome: Ongoing, Progressing       Patient is AAO x4 with intermittent confusion. POC reviewed with patient. Patient verbalized understanding. Patient's breathing is unlabored with equal chest expansion. Patient has an incision on posterior neck; c-collar in place. Patient remained free from falls. Patient continued to try to get OOB, sitter at bedside. Bilateral wrist restraints placed d/t patient becoming combative and trying to get OOB. Bed in lowest position,bed alarm on, side rails up x3, no complaints or signs of distress. WCTM.  See flowsheets for full assessment and VS info.

## 2022-05-07 NOTE — PROGRESS NOTES
"[facility] ([facility] Medicine  Progress Note    Patient Name: Diego Gunter  MRN: [id number]  Patient Class: IP- Inpatient   Admission Date: 4/28/2022  Length of Stay: 9 days  Attending Physician: Carlos Miller MD  Primary Care Provider: Portia Ferreira MD        Subjective:     Principal Problem:Epidural hematoma        HPI:  Mr. Gunter is a 81 year-old male with antiphospholipid syndrome (diagnosed 11/2021, on warfarin), PE, HLD, MAC (diagnosed in 2018, on chronic ethambutol and azithromycin), previous prostate ca (diagnosed in 2011, s/p prostatectomy) who presented to the hospital on 04/28 with R-sided weakness. Stroke workup was negative, but imaging was consistent with a compressive epidural hematoma ni the cervical spine C3-C6. He underwent spinal decompression and fluid evacuation with NSGY on 04/28. Since admitted, the Hematology/Oncology service was consulted- recommended not resuming therapeutic anticoagulation for APLS given current bleeding event and starting ppx anticoagulation when deemed safe by nsgy. ID was also consulted for recommendations regarding whether the epidural fluid collection could be an infectious collection- recommended starting dapto until path returned. Hospital course has been complicated by shock with intermittent pressor requirements (off since 05/01) and waxing/waning encephalopathy. He was noted to have acute changes in mental status around 05/01 which prompted a workup- EEG was obtained without epileptiform changes but with evidence of diffuse background slowing. Medicine consulted for "medical management; APS previously on coumadin; post op delirium".      Overview/Hospital Course:  No notes on file    Interval History: NAEON. Afebrile, HD stable. Pt with >6L UOP in the last 24hrs. Encourage enteral water intake. Waxing/waning confusion improving.    Review of Systems   Constitutional:  Positive for fatigue. Negative for chills and fever.   HENT:  " Negative for congestion and sore throat.    Respiratory:  Negative for cough and shortness of breath.    Cardiovascular:  Negative for chest pain, palpitations and leg swelling.   Gastrointestinal:  Negative for abdominal distention, abdominal pain, constipation, diarrhea, nausea and vomiting.   Genitourinary:  Negative for dysuria and frequency.   Musculoskeletal:  Negative for back pain and myalgias.   Skin:  Negative for color change and rash.   Neurological:  Negative for light-headedness and headaches.   Psychiatric/Behavioral:  Negative for confusion and hallucinations.    Objective:     Vital Signs (Most Recent):  Temp: 97.6 °F (36.4 °C) (05/07/22 0400)  Pulse: 83 (05/07/22 0700)  Resp: 18 (05/07/22 0400)  BP: (!) 142/71 (05/07/22 0400)  SpO2: 99 % (05/07/22 0400)   Vital Signs (24h Range):  Temp:  [97 °F (36.1 °C)-98.6 °F (37 °C)] 97.6 °F (36.4 °C)  Pulse:  [53-83] 83  Resp:  [16-18] 18  SpO2:  [96 %-99 %] 99 %  BP: (135-162)/(61-71) 142/71     Weight: 74.8 kg (165 lb)  Body mass index is 24.37 kg/m².    Intake/Output Summary (Last 24 hours) at 5/7/2022 0802  Last data filed at 5/6/2022 2200  Gross per 24 hour   Intake --   Output 4975 ml   Net -4975 ml      Physical Exam  Constitutional:       General: He is not in acute distress.     Appearance: He is not ill-appearing or toxic-appearing.   HENT:      Head: Normocephalic and atraumatic.      Nose: No congestion or rhinorrhea.      Mouth/Throat:      Pharynx: No oropharyngeal exudate or posterior oropharyngeal erythema.   Eyes:      General: No scleral icterus.     Conjunctiva/sclera: Conjunctivae normal.   Cardiovascular:      Rate and Rhythm: Normal rate and regular rhythm.      Pulses: Normal pulses.      Heart sounds: Normal heart sounds.   Pulmonary:      Effort: Pulmonary effort is normal.      Breath sounds: Normal breath sounds.   Abdominal:      General: Abdomen is flat. Bowel sounds are normal. There is no distension.      Palpations: Abdomen is  soft.      Tenderness: There is no abdominal tenderness. There is no guarding or rebound.   Genitourinary:     Comments: Floyd in place  Musculoskeletal:         General: No swelling.      Right lower leg: No edema.      Left lower leg: No edema.   Skin:     General: Skin is warm.      Coloration: Skin is not jaundiced.   Neurological:      General: No focal deficit present.      Mental Status: He is alert.      Comments: Oriented to person, intermittently to time and place  Follows commands appropriately  Strength 4/5 throughout  Sensation intact throughout   Psychiatric:         Mood and Affect: Mood normal.         Behavior: Behavior normal.       Significant Labs: All pertinent labs within the past 24 hours have been reviewed.    Significant Imaging: I have reviewed all pertinent imaging results/findings within the past 24 hours.      Assessment/Plan:      * Epidural hematoma  --continue management per primary      Hypernatremia  Secondary to dehydration  IVF, LR 125cc/hr and encourage enteral water intake as patient has post obstructive diuresis        Impaired mobility        Acute encephalopathy  --continue multimodal pain regimen and avoid sedatives as allowed  --follow infectious workup - NGTD  --continue daily miralax and senna, PRN lactulose  --urinary retention with bilateral hydroureteronephrosis, urology following. Catheter in place.  --restart seroquel as patient with combative behavior overnight after d/c    Urinary retention        Intermittent confusion        Acute thoracic back pain  -patient seen by vascular Surgical input appreciated.  No recommendation for intervention  -CTA chest/abdomen/pelvis showed no dissection, no PE, questionable changes C3-C6 epidural process.    -MRI brain and C-spine ordered       Acute ischemic left MCA stroke  Neurology consulted      Antiphospholipid syndrome        Hypercoagulable state        History of pulmonary embolism  --hold therapeutic anticoagulation at  this time, per Heme/Onc recs, given recent bleed    Cognitive communication deficit  SLP consulted      Mild cognitive impairment        HILLARY (mycobacterium avium-intracellulare)  --continue ethambutol and azithromycin      Cavitary lung disease        Hyperlipidemia associated with type 2 diabetes mellitus  --continue statin      Prediabetes        History of prostate cancer        VTE Risk Mitigation (From admission, onward)         Ordered     heparin (porcine) injection 5,000 Units  Every 8 hours         04/29/22 1704     Reason for No Pharmacological VTE Prophylaxis  Once        Question:  Reasons:  Answer:  Risk of Bleeding    04/28/22 2058     IP VTE HIGH RISK PATIENT  Once         04/28/22 2058     Place sequential compression device  Until discontinued         04/28/22 2058                Discharge Planning   MALIA: 5/9/2022     Code Status: Full Code   Is the patient medically ready for discharge?: No    Reason for patient still in hospital (select all that apply): Patient trending condition and Pending disposition  Discharge Plan A: Rehab   Discharge Delays: None known at this time              Marilee Mcginnis ([place] Name: Daniel), MD  Department of Hospital Medicine   [facility] - Neurosurgery ([facility])

## 2022-05-07 NOTE — ASSESSMENT & PLAN NOTE
80 y/o male with APS on coumadin, hx PE, MAC pneumonia, HLD with spontaneous C3-C6 epidural hematoma compressing the spinal cord    S/p  C3-C6 laminectomy and fusion for evacuation of epidural hematoma 4/28    Stepped down to NPU    -- CTH 4/29 no acute intracranial process   -- Post op MRI C spine satisfactory decompression with continued cord signal edema   --postop XR C spine hardware in good position  --Hypotension: BP stable on 5/5 s/p IVF. Appreciate HM assistance.    --Continue Zosyn for empiric coverage of gram neg and anaerobes.   --Reglan discontinued  -- Acute encephalopathy:    --CTH 5/3 ordered per medicine team for some confusion: without evidence of acute hemorrhage.    --Hypernatremia: Na 148 today, continue to monitor daily   --BLE US wo DVT, UA neg, CXR negative for acute processes   --Telesitter discontinued.  --Agitation:    -Restraints replaced 5/7 due to combative agitation. Wean restraints and use redirection in place of restraints as able.   -Scheduled 25 mg Seroquel qhs. Monitor QTc. Daily EKG.   -- Bilateral hydroureteronephrosis: Catheter in place. Urology following.   -- C-collar to be worn when OOB  -- HV drains removed 4/30  -- MAP goals completed in ICU  -- History of antiphospholipid syndrome and PE: hold warfarin. Heme/onc following.   -- SLP recommending regular diet with nectar thick liquids.  -- Dex taper to off.   -- Constipation: Bowel movement s/p enema on 5/3  -- Activity: PT/OT recs for rehab  -- DVT prophylaxis: TEDs/SCDs/SQH  --Concern for hematoma vs abscess. Final path returned as blood clot. Daptomycin discontinued per ID recs.    - Specimen sent for cultures 5/6. CTM.    Dispo: Pending rehab, referrals sent.

## 2022-05-07 NOTE — PROGRESS NOTES
[facility] - Neurosurgery ([facility])  Neurosurgery  Progress Note    Subjective:     History of Present Illness: Patient is a 82 y/o male with antiphospholipid syndrome on chronic coumadin, MAC pneumonia, HLD, and left PE diagnosed January 2021 who was transferred from  for compressive epidural hematoma in the cervical spine. Patient reports that he woke up in the middle of the night and could no move the right side of his body. He went to  ED and stroke code initiated. Patient was not given tPA but given ASA. CTA was negative for acute CVA but did show compressive fluid collective from C3-C6. MRI cervical spine confirmed. Patient was transferred to [facility] ED for neurosurgical evaluation. He reports he cannot move his right arm out of the contracted position, he also cannot lift his right leg off the bed. He reports the right arm and leg are painful. No complaints on left side of body. Denies b/b dysfunction.       Post-Op Info:  Procedure(s) (LRB):  LAMINECTOMY, SPINE, CERVICAL, POSTERIOR APPROACH C3-6 (Bilateral)   9 Days Post-Op     Interval History: Agitation and confusion this AM. Restraints replaced as patient combative and struck sitter. Will wean restraints and encourage redirection from sitter and wife at bedside. Start nightly Seroquel. Strength stable.     Medications:  Continuous Infusions:   lactated ringers 125 mL/hr at 05/07/22 0602     Scheduled Meds:   acetaminophen  1,000 mg Oral TID    albuterol-ipratropium  3 mL Nebulization Q6H    azithromycin  500 mg Oral Daily    dexAMETHasone  2 mg Oral Q6H    Followed by    [START ON 5/9/2022] dexAMETHasone  2 mg Oral Q8H    Followed by    [START ON 5/10/2022] dexAMETHasone  2 mg Oral Q12H    Followed by    [START ON 5/13/2022] dexAMETHasone  2 mg Oral Daily    ethambutoL  800 mg Oral Daily    famotidine  20 mg Oral BID    heparin (porcine)  5,000 Units Subcutaneous Q8H    LIDOcaine  1 patch Transdermal Q24H    melatonin  9 mg Oral Nightly     "morphine  2 mg Intravenous Once    polyethylene glycol  17 g Oral Daily    pravastatin  10 mg Oral Daily    QUEtiapine  25 mg Oral QHS    senna  8.6 mg Oral BID    silodosin  4 mg Oral Daily    sodium chloride 3%  4 mL Nebulization BID     PRN Meds:acetaminophen, dextrose 10%, dextrose 10%, glucagon (human recombinant), insulin aspart U-100, lactulose, ondansetron, oxyCODONE     Review of Systems  Objective:     Weight: 74.8 kg (165 lb)  Body mass index is 24.37 kg/m².  Vital Signs (Most Recent):  Temp: 98.3 °F (36.8 °C) (05/07/22 0805)  Pulse: 88 (05/07/22 0912)  Resp: 20 (05/07/22 0912)  BP: (!) 150/74 (05/07/22 0805)  SpO2: 99 % (05/07/22 0912)   Vital Signs (24h Range):  Temp:  [97 °F (36.1 °C)-98.6 °F (37 °C)] 98.3 °F (36.8 °C)  Pulse:  [53-88] 88  Resp:  [16-20] 20  SpO2:  [96 %-99 %] 99 %  BP: (135-162)/(61-74) 150/74                          Urethral Catheter 05/05/22 0200 Straight-tip;Non-latex (Active)   Site Assessment Clean;Intact 05/07/22 0400   Collection Container Standard drainage bag 05/07/22 0400   Securement Method secured to top of thigh w/ adhesive device 05/07/22 0400   Catheter Care Performed yes 05/07/22 0400   Reason for Continuing Urinary Catheterization Urinary retention 05/07/22 0400   CAUTI Prevention Bundle Securement Device in place with 1" slack;Intact seal between catheter & drainage tubing;Sheeting clip in use;Drainage bag/urimeter below bladder;Drainage bag/urimeter off the floor;No dependent loops or kinks;Drainage bag/urimeter not overfilled (<2/3 full) 05/06/22 2000   Output (mL) 2275 mL 05/06/22 1800     Neurosurgery Physical Exam  General: well developed, well nourished, no distress.   Head: normocephalic, atraumatic  Neurologic: Alert and oriented. Thought content somewhat appropriate. Some conversation hard to follow, patient with higher level confusion  Mental Status: Awake, Alert, Oriented x 3 (not oriented to year). Higher level confusion.   Language: No " aphasia  Speech: No dysarthria  Cranial nerves: face symmetric, tongue midline, CN II-XII grossly intact.   Eyes: pupils equal, round, reactive to light with accommodation, EOMI.   Pulmonary: normal respirations, no signs of respiratory distress  Abdomen: soft,slightly distended  Skin: Skin is warm, dry and intact.  Sensory: intact to light touch throughout     Motor Strength:Moves all extremities spontaneously with good tone.  Full strength upper and lower extremities. No abnormal movements seen.      Strength   Deltoids Triceps Biceps Wrist Extension Wrist Flexion Hand    Upper: R 5/5 5/5 5/5 5/5 5/5 5/5     L 5/5 5/5 5/5 5/5 5/5 5/5       Iliopsoas Quadriceps Knee  Flexion Tibialis  anterior Gastro- cnemius EHL   Lower: R 5/5 5/5 5/5 5/5 5/5 5/5     L 5/5 5/5 5/5 5/5 5/5 5/5      Posterior cervical incision: Clean, dry, staples intact.     Significant Labs:  Recent Labs   Lab 05/06/22  0739 05/06/22 2000 05/07/22  0827   *  --  108     145 143 147*   K 3.6  --  3.8     --  112*   CO2 29  --  25   BUN 11  --  12   CREATININE 0.7  --  0.8   CALCIUM 8.9  --  9.0   MG 1.9  --  1.8     Recent Labs   Lab 05/06/22  0739 05/07/22  0828   WBC 13.87* 16.60*   HGB 10.4* 11.1*   HCT 33.6* 34.8*    237     Recent Labs   Lab 05/06/22 0739 05/07/22  0828   INR 1.1 1.1   APTT 35.5* 28.8     Microbiology Results (last 7 days)       Procedure Component Value Units Date/Time    Blood culture [[id number]] Collected: 05/03/22 1542    Order Status: Completed Specimen: Blood from Peripheral, Left Hand Updated: 05/06/22 1612     Blood Culture, Routine No Growth to date      No Growth to date      No Growth to date      No Growth to date    Narrative:      Collection has been rescheduled by [facility] at 05/03/2022 12:38 Reason:   Patient unavailable going yo ct will yry back  Collection has been rescheduled by 6 at 05/03/2022 12:41 Reason:   Spe with sabino stanton Patient unavailable going to ct  Collection has  been rescheduled by 6 at 05/03/2022 14:03 Reason:   Patient unavailable in ultrasound sabino valenzuela said come back in an hr   Collection has been rescheduled by 6 at 05/03/2022 12:38 Reason:   Patient unavailable going yo ct will yry back  Collection has been rescheduled by 6 at 05/03/2022 12:41 Reason:   Spe with sabino stanton Patient unavailable going to ct  Collection has been rescheduled by 6 at 05/03/2022 14:03 Reason:   Patient unavailable in ultrasound sabino valenzuela said come back in an hr     Blood culture [[id number]] Collected: 05/03/22 1543    Order Status: Completed Specimen: Blood from Peripheral, Right Hand Updated: 05/06/22 1612     Blood Culture, Routine No Growth to date      No Growth to date      No Growth to date      No Growth to date    Narrative:      Collection has been rescheduled by [facility] at 05/03/2022 12:38 Reason:   Patient unavailable going yo ct will yry back  Collection has been rescheduled by [facility] at 05/03/2022 12:41 Reason:   Spe with sabino stanton Patient unavailable going to ct  Collection has been rescheduled by [facility] at 05/03/2022 14:03 Reason:   Patient unavailable in ultrasound sabino valenzuela said come back in an hr   Collection has been rescheduled by [facility] at 05/03/2022 12:38 Reason:   Patient unavailable going yo ct will yry back  Collection has been rescheduled by [facility] at 05/03/2022 12:41 Reason:   Spe with sabino stanton Patient unavailable going to ct  Collection has been rescheduled by [facility] at 05/03/2022 14:03 Reason:   Patient unavailable in ultrasound sabino valenzuela said come back in an hr     Culture, Anaerobe [[id number]]     Order Status: Completed Specimen: Neck     Aerobic culture [[id number]]     Order Status: Completed Specimen: Neck     AFB Culture & Smear [[id number]]     Order Status: No result Specimen: Respiratory from Sputum, Expectorated     Culture, Respiratory with Gram Stain [[id number]]     Order Status: No result Specimen: Respiratory     Blood culture [[id number]] Collected: 04/28/22 1201     Order Status: Completed Specimen: Blood from Peripheral, Forearm, Right Updated: 05/02/22 1303     Blood Culture, Routine No Growth after 4 days.     Blood culture [[id number]] Collected: 04/28/22 1201    Order Status: Completed Specimen: Blood from Peripheral, Hand, Left Updated: 05/02/22 1303     Blood Culture, Routine No Growth after 4 days.           All pertinent labs from the last 24 hours have been reviewed.    Significant Diagnostics:  I have reviewed all pertinent imaging results/findings within the past 24 hours.    Assessment/Plan:     * Epidural hematoma  82 y/o male with APS on coumadin, hx PE, MAC pneumonia, HLD with spontaneous C3-C6 epidural hematoma compressing the spinal cord    S/p  C3-C6 laminectomy and fusion for evacuation of epidural hematoma 4/28    Stepped down to NPU    -- CTH 4/29 no acute intracranial process   -- Post op MRI C spine satisfactory decompression with continued cord signal edema   --postop XR C spine hardware in good position  --Hypotension: BP stable on 5/5 s/p IVF. Appreciate HM assistance.    --Continue Zosyn for empiric coverage of gram neg and anaerobes.   --Reglan discontinued  -- Acute encephalopathy:    --CTH 5/3 ordered per medicine team for some confusion: without evidence of acute hemorrhage.    --Hypernatremia: Na 148 today, continue to monitor daily   --BLE US wo DVT, UA neg, CXR negative for acute processes   --Telesitter discontinued.  --Agitation:    -Restraints replaced 5/7 due to combative agitation. Wean restraints and use redirection in place of restraints as able.   -Scheduled 25 mg Seroquel qhs. Monitor QTc. Daily EKG.   -- Bilateral hydroureteronephrosis: Catheter in place. Urology following.   -- C-collar to be worn when OOB  -- HV drains removed 4/30  -- MAP goals completed in ICU  -- History of antiphospholipid syndrome and PE: hold warfarin. Heme/onc following.   -- SLP recommending regular diet with nectar thick liquids.  -- Dex taper to off.   --  Constipation: Bowel movement s/p enema on 5/3  -- Activity: PT/OT recs for rehab  -- DVT prophylaxis: TEDs/SCDs/SQH  --Concern for hematoma vs abscess. Final path returned as blood clot. Daptomycin discontinued per ID recs.    - Specimen sent for cultures 5/6. CTM.    Dispo: Pending rehab, referrals sent.        Isabel Barrientos PA-C  Neurosurgery  [facility] - Neurosurgery ([facility])

## 2022-05-07 NOTE — ASSESSMENT & PLAN NOTE
--continue multimodal pain regimen and avoid sedatives as allowed  --follow infectious workup - NGTD  --continue daily miralax and senna, PRN lactulose  --urinary retention with bilateral hydroureteronephrosis, urology following. Catheter in place.

## 2022-05-07 NOTE — PLAN OF CARE
[facility] - Neurosurgery (Hospital)  Discharge Reassessment    Primary Care Provider: Portia Ferreira MD    Expected Discharge Date: 5/9/2022    Reassessment (most recent)       Discharge Reassessment - 05/07/22 1358          Discharge Reassessment    Assessment Type Discharge Planning Reassessment (P)      Did the patient's condition or plan change since previous assessment? No (P)      Discharge Plan discussed with: Patient (P)    Patient inpt rehab auth denied by payor. SW sent SNF auth request to . CM will f/u on Monday. pt preference is [facility].    Communicated MALIA with patient/caregiver Yes (P)      Discharge Plan A Skilled Nursing Facility (P)      Discharge Plan B Home Health;Home with family (P)      DME Needed Upon Discharge  other (see comments) (P)    TBD    Discharge Barriers Identified None (P)      Why the patient remains in the hospital Requires continued medical care (P)         Post-Acute Status    Post-Acute Authorization Placement (P)      Post-Acute Placement Status Referrals Sent (P)    [facility]    Discharge Delays None known at this time (P)                      Carey Jay LMSW  Case Management Social Worker   [facility] [address]

## 2022-05-07 NOTE — ASSESSMENT & PLAN NOTE
--continue multimodal pain regimen and avoid sedatives as allowed  --follow infectious workup - NGTD  --continue daily miralax and senna, PRN lactulose  --urinary retention with bilateral hydroureteronephrosis, urology following. Catheter in place.  --restart seroquel as patient with combative behavior overnight   --delirium precautions  -- MRI brain w/wo contrast given patient with AMS in the setting of intermittent expressive aphasia  -- B1 in progress, RPR  -- Folic acid, thiamine and multivitamin  -- Depacon 250mg x1 dose, ordered  -- Depacon 250mg q8h prn for non-redirectable agitation, ordered

## 2022-05-08 LAB
ALBUMIN SERPL BCP-MCNC: 3.4 G/DL (ref 3.5–5.2)
ALP SERPL-CCNC: 71 U/L (ref 55–135)
ALT SERPL W/O P-5'-P-CCNC: 50 U/L (ref 10–44)
ANION GAP SERPL CALC-SCNC: 7 MMOL/L (ref 8–16)
APTT BLDCRRT: 28.7 SEC (ref 21–32)
AST SERPL-CCNC: 36 U/L (ref 10–40)
BACTERIA BLD CULT: NORMAL
BACTERIA BLD CULT: NORMAL
BASOPHILS # BLD AUTO: 0.05 K/UL (ref 0–0.2)
BASOPHILS NFR BLD: 0.3 % (ref 0–1.9)
BILIRUB SERPL-MCNC: 0.6 MG/DL (ref 0.1–1)
BUN SERPL-MCNC: 11 MG/DL (ref 8–23)
CALCIUM SERPL-MCNC: 9 MG/DL (ref 8.7–10.5)
CHLORIDE SERPL-SCNC: 110 MMOL/L (ref 95–110)
CO2 SERPL-SCNC: 25 MMOL/L (ref 23–29)
CREAT SERPL-MCNC: 0.7 MG/DL (ref 0.5–1.4)
DIFFERENTIAL METHOD: ABNORMAL
EOSINOPHIL # BLD AUTO: 0 K/UL (ref 0–0.5)
EOSINOPHIL NFR BLD: 0 % (ref 0–8)
ERYTHROCYTE [DISTWIDTH] IN BLOOD BY AUTOMATED COUNT: 16.1 % (ref 11.5–14.5)
EST. GFR  (AFRICAN AMERICAN): >60 ML/MIN/1.73 M^2
EST. GFR  (NON AFRICAN AMERICAN): >60 ML/MIN/1.73 M^2
GLUCOSE SERPL-MCNC: 143 MG/DL (ref 70–110)
HCT VFR BLD AUTO: 33.9 % (ref 40–54)
HGB BLD-MCNC: 10.8 G/DL (ref 14–18)
IMM GRANULOCYTES # BLD AUTO: 0.7 K/UL (ref 0–0.04)
IMM GRANULOCYTES NFR BLD AUTO: 4.2 % (ref 0–0.5)
INR PPP: 1 (ref 0.8–1.2)
LYMPHOCYTES # BLD AUTO: 1.8 K/UL (ref 1–4.8)
LYMPHOCYTES NFR BLD: 10.9 % (ref 18–48)
MAGNESIUM SERPL-MCNC: 1.8 MG/DL (ref 1.6–2.6)
MCH RBC QN AUTO: 27.3 PG (ref 27–31)
MCHC RBC AUTO-ENTMCNC: 31.9 G/DL (ref 32–36)
MCV RBC AUTO: 86 FL (ref 82–98)
MONOCYTES # BLD AUTO: 1.4 K/UL (ref 0.3–1)
MONOCYTES NFR BLD: 8.4 % (ref 4–15)
NEUTROPHILS # BLD AUTO: 12.7 K/UL (ref 1.8–7.7)
NEUTROPHILS NFR BLD: 76.2 % (ref 38–73)
NRBC BLD-RTO: 0 /100 WBC
PHOSPHATE SERPL-MCNC: 2.9 MG/DL (ref 2.7–4.5)
PLATELET # BLD AUTO: 240 K/UL (ref 150–450)
PMV BLD AUTO: 11.4 FL (ref 9.2–12.9)
POCT GLUCOSE: 145 MG/DL (ref 70–110)
POCT GLUCOSE: 180 MG/DL (ref 70–110)
POTASSIUM SERPL-SCNC: 3.8 MMOL/L (ref 3.5–5.1)
PROT SERPL-MCNC: 6.1 G/DL (ref 6–8.4)
PROTHROMBIN TIME: 10.7 SEC (ref 9–12.5)
RBC # BLD AUTO: 3.95 M/UL (ref 4.6–6.2)
SODIUM SERPL-SCNC: 142 MMOL/L (ref 136–145)
SODIUM SERPL-SCNC: 142 MMOL/L (ref 136–145)
SODIUM SERPL-SCNC: 144 MMOL/L (ref 136–145)
WBC # BLD AUTO: 16.64 K/UL (ref 3.9–12.7)

## 2022-05-08 PROCEDURE — 36415 COLL VENOUS BLD VENIPUNCTURE: CPT | Performed by: STUDENT IN AN ORGANIZED HEALTH CARE EDUCATION/TRAINING PROGRAM

## 2022-05-08 PROCEDURE — 94760 N-INVAS EAR/PLS OXIMETRY 1: CPT

## 2022-05-08 PROCEDURE — 99233 PR SUBSEQUENT HOSPITAL CARE,LEVL III: ICD-10-PCS | Mod: GC,,, | Performed by: HOSPITALIST

## 2022-05-08 PROCEDURE — 99900035 HC TECH TIME PER 15 MIN (STAT)

## 2022-05-08 PROCEDURE — 84100 ASSAY OF PHOSPHORUS: CPT | Performed by: STUDENT IN AN ORGANIZED HEALTH CARE EDUCATION/TRAINING PROGRAM

## 2022-05-08 PROCEDURE — 93010 ELECTROCARDIOGRAM REPORT: CPT | Mod: ,,, | Performed by: INTERNAL MEDICINE

## 2022-05-08 PROCEDURE — 85610 PROTHROMBIN TIME: CPT | Performed by: STUDENT IN AN ORGANIZED HEALTH CARE EDUCATION/TRAINING PROGRAM

## 2022-05-08 PROCEDURE — 80053 COMPREHEN METABOLIC PANEL: CPT | Performed by: STUDENT IN AN ORGANIZED HEALTH CARE EDUCATION/TRAINING PROGRAM

## 2022-05-08 PROCEDURE — 97530 THERAPEUTIC ACTIVITIES: CPT | Mod: CQ

## 2022-05-08 PROCEDURE — 86592 SYPHILIS TEST NON-TREP QUAL: CPT | Performed by: HOSPITALIST

## 2022-05-08 PROCEDURE — 93005 ELECTROCARDIOGRAM TRACING: CPT

## 2022-05-08 PROCEDURE — 63600175 PHARM REV CODE 636 W HCPCS: Performed by: STUDENT IN AN ORGANIZED HEALTH CARE EDUCATION/TRAINING PROGRAM

## 2022-05-08 PROCEDURE — 63600175 PHARM REV CODE 636 W HCPCS: Performed by: PHYSICIAN ASSISTANT

## 2022-05-08 PROCEDURE — 63600175 PHARM REV CODE 636 W HCPCS

## 2022-05-08 PROCEDURE — 25000003 PHARM REV CODE 250: Performed by: STUDENT IN AN ORGANIZED HEALTH CARE EDUCATION/TRAINING PROGRAM

## 2022-05-08 PROCEDURE — 63700000 PHARM REV CODE 250 ALT 637 W/O HCPCS: Performed by: STUDENT IN AN ORGANIZED HEALTH CARE EDUCATION/TRAINING PROGRAM

## 2022-05-08 PROCEDURE — 25000003 PHARM REV CODE 250

## 2022-05-08 PROCEDURE — 94640 AIRWAY INHALATION TREATMENT: CPT

## 2022-05-08 PROCEDURE — 25000242 PHARM REV CODE 250 ALT 637 W/ HCPCS: Performed by: STUDENT IN AN ORGANIZED HEALTH CARE EDUCATION/TRAINING PROGRAM

## 2022-05-08 PROCEDURE — 93010 EKG 12-LEAD: ICD-10-PCS | Mod: ,,, | Performed by: INTERNAL MEDICINE

## 2022-05-08 PROCEDURE — 83735 ASSAY OF MAGNESIUM: CPT | Performed by: STUDENT IN AN ORGANIZED HEALTH CARE EDUCATION/TRAINING PROGRAM

## 2022-05-08 PROCEDURE — 94761 N-INVAS EAR/PLS OXIMETRY MLT: CPT

## 2022-05-08 PROCEDURE — 84295 ASSAY OF SERUM SODIUM: CPT | Mod: 91

## 2022-05-08 PROCEDURE — 85730 THROMBOPLASTIN TIME PARTIAL: CPT | Performed by: STUDENT IN AN ORGANIZED HEALTH CARE EDUCATION/TRAINING PROGRAM

## 2022-05-08 PROCEDURE — 99233 SBSQ HOSP IP/OBS HIGH 50: CPT | Mod: GC,,, | Performed by: HOSPITALIST

## 2022-05-08 PROCEDURE — 85025 COMPLETE CBC W/AUTO DIFF WBC: CPT | Performed by: STUDENT IN AN ORGANIZED HEALTH CARE EDUCATION/TRAINING PROGRAM

## 2022-05-08 PROCEDURE — 97116 GAIT TRAINING THERAPY: CPT | Mod: CQ

## 2022-05-08 PROCEDURE — 25000003 PHARM REV CODE 250: Performed by: HOSPITALIST

## 2022-05-08 PROCEDURE — 25000003 PHARM REV CODE 250: Performed by: PHYSICIAN ASSISTANT

## 2022-05-08 PROCEDURE — 11000001 HC ACUTE MED/SURG PRIVATE ROOM

## 2022-05-08 RX ORDER — THIAMINE HCL 100 MG
100 TABLET ORAL DAILY
Status: DISCONTINUED | OUTPATIENT
Start: 2022-05-08 | End: 2022-05-13 | Stop reason: HOSPADM

## 2022-05-08 RX ORDER — VALPROIC ACID 250 MG/5ML
500 SOLUTION ORAL EVERY 8 HOURS PRN
Status: DISCONTINUED | OUTPATIENT
Start: 2022-05-08 | End: 2022-05-08

## 2022-05-08 RX ORDER — VALPROIC ACID 250 MG/5ML
250 SOLUTION ORAL ONCE
Status: COMPLETED | OUTPATIENT
Start: 2022-05-08 | End: 2022-05-08

## 2022-05-08 RX ORDER — FOLIC ACID 1 MG/1
1 TABLET ORAL DAILY
Status: DISCONTINUED | OUTPATIENT
Start: 2022-05-08 | End: 2022-05-13 | Stop reason: HOSPADM

## 2022-05-08 RX ORDER — VALPROIC ACID 250 MG/5ML
250 SOLUTION ORAL ONCE
Status: DISCONTINUED | OUTPATIENT
Start: 2022-05-08 | End: 2022-05-08

## 2022-05-08 RX ORDER — VALPROIC ACID 250 MG/5ML
250 SOLUTION ORAL EVERY 8 HOURS PRN
Status: DISCONTINUED | OUTPATIENT
Start: 2022-05-08 | End: 2022-05-09

## 2022-05-08 RX ORDER — GADOBUTROL 604.72 MG/ML
8 INJECTION INTRAVENOUS
Status: COMPLETED | OUTPATIENT
Start: 2022-05-09 | End: 2022-05-08

## 2022-05-08 RX ORDER — VALPROIC ACID 250 MG/5ML
250 SOLUTION ORAL EVERY 8 HOURS PRN
Status: DISCONTINUED | OUTPATIENT
Start: 2022-05-08 | End: 2022-05-08

## 2022-05-08 RX ADMIN — HEPARIN SODIUM 5000 UNITS: 5000 INJECTION INTRAVENOUS; SUBCUTANEOUS at 06:05

## 2022-05-08 RX ADMIN — AZITHROMYCIN MONOHYDRATE 500 MG: 250 TABLET ORAL at 09:05

## 2022-05-08 RX ADMIN — DEXAMETHASONE 2 MG: 1 TABLET ORAL at 10:05

## 2022-05-08 RX ADMIN — DEXAMETHASONE 2 MG: 1 TABLET ORAL at 12:05

## 2022-05-08 RX ADMIN — QUETIAPINE FUMARATE 25 MG: 25 TABLET ORAL at 10:05

## 2022-05-08 RX ADMIN — GADOBUTROL 8 ML: 604.72 INJECTION INTRAVENOUS at 11:05

## 2022-05-08 RX ADMIN — LIDOCAINE 1 PATCH: 50 PATCH CUTANEOUS at 12:05

## 2022-05-08 RX ADMIN — LIDOCAINE 1 PATCH: 50 PATCH CUTANEOUS at 05:05

## 2022-05-08 RX ADMIN — DEXAMETHASONE 2 MG: 1 TABLET ORAL at 06:05

## 2022-05-08 RX ADMIN — ACETAMINOPHEN 1000 MG: 500 TABLET ORAL at 02:05

## 2022-05-08 RX ADMIN — IPRATROPIUM BROMIDE AND ALBUTEROL SULFATE 3 ML: 2.5; .5 SOLUTION RESPIRATORY (INHALATION) at 03:05

## 2022-05-08 RX ADMIN — THIAMINE HCL TAB 100 MG 100 MG: 100 TAB at 02:05

## 2022-05-08 RX ADMIN — SILODOSIN 4 MG: 4 CAPSULE ORAL at 09:05

## 2022-05-08 RX ADMIN — OXYCODONE 5 MG: 5 TABLET ORAL at 10:05

## 2022-05-08 RX ADMIN — FOLIC ACID 1 MG: 1 TABLET ORAL at 02:05

## 2022-05-08 RX ADMIN — DEXAMETHASONE 2 MG: 1 TABLET ORAL at 01:05

## 2022-05-08 RX ADMIN — HEPARIN SODIUM 5000 UNITS: 5000 INJECTION INTRAVENOUS; SUBCUTANEOUS at 02:05

## 2022-05-08 RX ADMIN — THERA TABS 1 TABLET: TAB at 02:05

## 2022-05-08 RX ADMIN — Medication 9 MG: at 10:05

## 2022-05-08 RX ADMIN — FAMOTIDINE 20 MG: 20 TABLET ORAL at 09:05

## 2022-05-08 RX ADMIN — SENNOSIDES 8.6 MG: 8.6 TABLET ORAL at 10:05

## 2022-05-08 RX ADMIN — FAMOTIDINE 20 MG: 20 TABLET ORAL at 10:05

## 2022-05-08 RX ADMIN — IPRATROPIUM BROMIDE AND ALBUTEROL SULFATE 3 ML: 2.5; .5 SOLUTION RESPIRATORY (INHALATION) at 07:05

## 2022-05-08 RX ADMIN — SODIUM CHLORIDE SOLN NEBU 3% 4 ML: 3 NEBU SOLN at 08:05

## 2022-05-08 RX ADMIN — PRAVASTATIN SODIUM 10 MG: 10 TABLET ORAL at 09:05

## 2022-05-08 RX ADMIN — SODIUM CHLORIDE SOLN NEBU 3% 4 ML: 3 NEBU SOLN at 07:05

## 2022-05-08 RX ADMIN — IPRATROPIUM BROMIDE AND ALBUTEROL SULFATE 3 ML: 2.5; .5 SOLUTION RESPIRATORY (INHALATION) at 08:05

## 2022-05-08 RX ADMIN — ACETAMINOPHEN 1000 MG: 500 TABLET ORAL at 10:05

## 2022-05-08 RX ADMIN — SODIUM CHLORIDE, SODIUM LACTATE, POTASSIUM CHLORIDE, AND CALCIUM CHLORIDE: 600; 310; 30; 20 INJECTION, SOLUTION INTRAVENOUS at 02:05

## 2022-05-08 RX ADMIN — VALPROIC ACID 250 MG: 250 SOLUTION ORAL at 05:05

## 2022-05-08 RX ADMIN — ACETAMINOPHEN 1000 MG: 500 TABLET ORAL at 09:05

## 2022-05-08 RX ADMIN — IPRATROPIUM BROMIDE AND ALBUTEROL SULFATE 3 ML: 2.5; .5 SOLUTION RESPIRATORY (INHALATION) at 01:05

## 2022-05-08 RX ADMIN — HEPARIN SODIUM 5000 UNITS: 5000 INJECTION INTRAVENOUS; SUBCUTANEOUS at 10:05

## 2022-05-08 NOTE — ASSESSMENT & PLAN NOTE
82 y/o male with APS on coumadin, hx PE, MAC pneumonia, HLD with spontaneous C3-C6 epidural hematoma compressing the spinal cord    S/p  C3-C6 laminectomy and fusion for evacuation of epidural hematoma 4/28    Stepped down to NPU    -- CTH 4/29 no acute intracranial process   -- Post op MRI C spine satisfactory decompression with continued cord signal edema   --postop XR C spine hardware in good position  --Hypotension: BP stable on 5/5 s/p IVF. Appreciate HM assistance.    --Continue Zosyn for empiric coverage of gram neg and anaerobes.   --Reglan discontinued  -- Acute encephalopathy:    --CTH 5/3 ordered per medicine team for some confusion: without evidence of acute hemorrhage.    --Hypernatremia: Na 148 today, continue to monitor daily   --BLE US wo DVT, UA neg, CXR negative for acute processes   --Telesitter discontinued.  --Agitation:    -Restraints replaced 5/7 due to combative agitation. Wean restraints and use redirection in place of restraints as able.   -Scheduled 25 mg Seroquel qhs. Monitor QTc. Daily EKG.   -- Bilateral hydroureteronephrosis: Catheter in place. Urology following.   -- C-collar to be worn when OOB  -- HV drains removed 4/30  -- MAP goals completed in ICU  -- History of antiphospholipid syndrome and PE: hold warfarin. Heme/onc following.   -- SLP recommending regular diet with nectar thick liquids.  -- Dex taper to off.   -- Constipation: Bowel movement s/p enema on 5/3  -- Activity: PT/OT recs for rehab  -- DVT prophylaxis: TEDs/SCDs/SQH  --Concern for hematoma vs abscess. Final path returned as blood clot. Daptomycin discontinued per ID recs.    - Specimen sent for cultures 5/6. CTM.    Dispo: Pending rehab, referrals sent.

## 2022-05-08 NOTE — PROGRESS NOTES
Tor[facility] Travis - Neurosurgery ([facility])  Neurosurgery  Progress Note    Subjective:     History of Present Illness: Patient is a 82 y/o male with antiphospholipid syndrome on chronic coumadin, MAC pneumonia, HLD, and left PE diagnosed January 2021 who was transferred from  for compressive epidural hematoma in the cervical spine. Patient reports that he woke up in the middle of the night and could no move the right side of his body. He went to  ED and stroke code initiated. Patient was not given tPA but given ASA. CTA was negative for acute CVA but did show compressive fluid collective from C3-C6. MRI cervical spine confirmed. Patient was transferred to [facility] ED for neurosurgical evaluation. He reports he cannot move his right arm out of the contracted position, he also cannot lift his right leg off the bed. He reports the right arm and leg are painful. No complaints on left side of body. Denies b/b dysfunction.       Post-Op Info:  Procedure(s) (LRB):  LAMINECTOMY, SPINE, CERVICAL, POSTERIOR APPROACH C3-6 (Bilateral)   10 Days Post-Op     Interval History: Continue agitation and confusion continue nightly Seroquel. Pending IPR     Medications:  Continuous Infusions:   lactated ringers 125 mL/hr at 05/08/22 0215     Scheduled Meds:   acetaminophen  1,000 mg Oral TID    albuterol-ipratropium  3 mL Nebulization Q6H    azithromycin  500 mg Oral Daily    dexAMETHasone  2 mg Oral Q6H    Followed by    [START ON 5/9/2022] dexAMETHasone  2 mg Oral Q8H    Followed by    [START ON 5/10/2022] dexAMETHasone  2 mg Oral Q12H    Followed by    [START ON 5/13/2022] dexAMETHasone  2 mg Oral Daily    ethambutoL  800 mg Oral Daily    famotidine  20 mg Oral BID    heparin (porcine)  5,000 Units Subcutaneous Q8H    LIDOcaine  1 patch Transdermal Q24H    melatonin  9 mg Oral Nightly    morphine  2 mg Intravenous Once    polyethylene glycol  17 g Oral Daily    pravastatin  10 mg Oral Daily    QUEtiapine  25 mg Oral QHS     "senna  8.6 mg Oral BID    silodosin  4 mg Oral Daily    sodium chloride 3%  4 mL Nebulization BID     PRN Meds:acetaminophen, dextrose 10%, dextrose 10%, glucagon (human recombinant), insulin aspart U-100, lactulose, ondansetron, oxyCODONE     Review of Systems  Objective:     Weight: 74.8 kg (165 lb)  Body mass index is 24.37 kg/m².  Vital Signs (Most Recent):  Temp: 98.4 °F (36.9 °C) (05/08/22 1100)  Pulse: 64 (05/08/22 1100)  Resp: 20 (05/08/22 0842)  BP: 134/69 (05/08/22 1100)  SpO2: 97 % (05/08/22 1100)   Vital Signs (24h Range):  Temp:  [97.8 °F (36.6 °C)-98.4 °F (36.9 °C)] 98.4 °F (36.9 °C)  Pulse:  [63-87] 64  Resp:  [18-20] 20  SpO2:  [93 %-99 %] 97 %  BP: (125-160)/(60-76) 134/69     Date 05/08/22 0700 - 05/09/22 0659   Shift 5098-9884 1870-3445 2720-2207 24 Hour Total   INTAKE   Shift Total(mL/kg)       OUTPUT   Urine(mL/kg/hr) 2500   2500   Shift Total(mL/kg) 2500(33.4)   2500(33.4)   Weight (kg) 74.8 74.8 74.8 74.8                        Urethral Catheter 05/05/22 0200 Straight-tip;Non-latex (Active)   Site Assessment Clean;Intact 05/07/22 0400   Collection Container Standard drainage bag 05/07/22 0400   Securement Method secured to top of thigh w/ adhesive device 05/07/22 0400   Catheter Care Performed yes 05/07/22 0400   Reason for Continuing Urinary Catheterization Urinary retention 05/07/22 0400   CAUTI Prevention Bundle Securement Device in place with 1" slack;Intact seal between catheter & drainage tubing;Sheeting clip in use;Drainage bag/urimeter below bladder;Drainage bag/urimeter off the floor;No dependent loops or kinks;Drainage bag/urimeter not overfilled (<2/3 full) 05/06/22 2000   Output (mL) 2275 mL 05/06/22 1800     Neurosurgery Physical Exam  General: well developed, well nourished, no distress.   Head: normocephalic, atraumatic  Neurologic: Alert and oriented. Thought content somewhat appropriate. Some conversation hard to follow, patient with higher level confusion  Mental Status: " Awake, Alert, Oriented x 3 (not oriented to year). Higher level confusion.   Language: No aphasia  Speech: No dysarthria  Cranial nerves: face symmetric, tongue midline, CN II-XII grossly intact.   Eyes: pupils equal, round, reactive to light with accommodation, EOMI.   Pulmonary: normal respirations, no signs of respiratory distress  Abdomen: soft,slightly distended  Skin: Skin is warm, dry and intact.  Sensory: intact to light touch throughout    Motor Strength:Moves all extremities spontaneously with good tone.  Full strength upper and lower extremities. No abnormal movements seen.      Strength   Deltoids Triceps Biceps Wrist Extension Wrist Flexion Hand    Upper: R 5/5 5/5 5/5 5/5 5/5 5/5     L 5/5 5/5 5/5 5/5 5/5 5/5       Iliopsoas Quadriceps Knee  Flexion Tibialis  anterior Gastro- cnemius EHL   Lower: R 5/5 5/5 5/5 5/5 5/5 5/5     L 5/5 5/5 5/5 5/5 5/5 5/5      Posterior cervical incision: Clean, dry, staples intact.     Significant Labs:  Recent Labs   Lab 05/07/22  0827 05/07/22 2026 05/08/22  0708 05/08/22  0750     --  143*  --    * 142 142 144   K 3.8  --  3.8  --    *  --  110  --    CO2 25  --  25  --    BUN 12  --  11  --    CREATININE 0.8  --  0.7  --    CALCIUM 9.0  --  9.0  --    MG 1.8  --  1.8  --        Recent Labs   Lab 05/07/22  0828 05/08/22  0708   WBC 16.60* 16.64*   HGB 11.1* 10.8*   HCT 34.8* 33.9*    240       Recent Labs   Lab 05/07/22  0828 05/08/22  0708   INR 1.1 1.0   APTT 28.8 28.7       Microbiology Results (last 7 days)       Procedure Component Value Units Date/Time    Blood culture [[id number]] Collected: 05/03/22 1542    Order Status: Completed Specimen: Blood from Peripheral, Left Hand Updated: 05/07/22 1612     Blood Culture, Routine No Growth to date      No Growth to date      No Growth to date      No Growth to date      No Growth to date    Narrative:      Collection has been rescheduled by TH6 at 05/03/2022 12:38 Reason:   Patient  unavailable going yo ct will yry back  Collection has been rescheduled by 6 at 05/03/2022 12:41 Reason:   Spe with sabino stanton Patient unavailable going to ct  Collection has been rescheduled by 6 at 05/03/2022 14:03 Reason:   Patient unavailable in ultrasound rn nicolas said come back in an hr   Collection has been rescheduled by 6 at 05/03/2022 12:38 Reason:   Patient unavailable going yo ct will yry back  Collection has been rescheduled by 6 at 05/03/2022 12:41 Reason:   Spe with rn romy Patient unavailable going to ct  Collection has been rescheduled by 6 at 05/03/2022 14:03 Reason:   Patient unavailable in ultrasound rn nicolas said come back in an hr     Blood culture [[id number]] Collected: 05/03/22 1543    Order Status: Completed Specimen: Blood from Peripheral, Right Hand Updated: 05/07/22 1612     Blood Culture, Routine No Growth to date      No Growth to date      No Growth to date      No Growth to date      No Growth to date    Narrative:      Collection has been rescheduled by 6 at 05/03/2022 12:38 Reason:   Patient unavailable going yo ct will yry back  Collection has been rescheduled by 6 at 05/03/2022 12:41 Reason:   Spe with sabino stanton Patient unavailable going to ct  Collection has been rescheduled by 6 at 05/03/2022 14:03 Reason:   Patient unavailable in ultrasound sabino valenzuela said come back in an hr   Collection has been rescheduled by [facility] at 05/03/2022 12:38 Reason:   Patient unavailable going yo ct will yry back  Collection has been rescheduled by [facility] at 05/03/2022 12:41 Reason:   Spe with saibno stanton Patient unavailable going to ct  Collection has been rescheduled by 6 at 05/03/2022 14:03 Reason:   Patient unavailable in ultrasound sabino valenzuela said come back in an hr     Culture, Anaerobe [[id number]]     Order Status: Completed Specimen: Neck     Aerobic culture [[id number]]     Order Status: Completed Specimen: Neck     AFB Culture & Smear [[id number]]     Order Status: No result Specimen:  Respiratory from Sputum, Expectorated     Culture, Respiratory with Gram Stain [[id number]]     Order Status: No result Specimen: Respiratory     Blood culture [[id number]] Collected: 04/28/22 1201    Order Status: Completed Specimen: Blood from Peripheral, Forearm, Right Updated: 05/02/22 1303     Blood Culture, Routine No Growth after 4 days.     Blood culture [[id number]] Collected: 04/28/22 1201    Order Status: Completed Specimen: Blood from Peripheral, Hand, Left Updated: 05/02/22 1303     Blood Culture, Routine No Growth after 4 days.           All pertinent labs from the last 24 hours have been reviewed.    Significant Diagnostics:  I have reviewed all pertinent imaging results/findings within the past 24 hours.  Physical Exam    Assessment/Plan:     * Epidural hematoma  80 y/o male with APS on coumadin, hx PE, MAC pneumonia, HLD with spontaneous C3-C6 epidural hematoma compressing the spinal cord    S/p  C3-C6 laminectomy and fusion for evacuation of epidural hematoma 4/28    --Stepped down to NPU   -- HV drains removed 4/30  -- MAP goals completed in ICU  --CTH 4/29 no acute intracranial process  --Post op MRI C spine satisfactory decompression with continued cord signal edema  --Postop XR C spine hardware in good position   --C-collar to be worn when OOB  -- Dex taper to off       --IO cx with Mycobacterium avium: on  Azithromycin and Ethambutol   --Hypotension: resolved. Appreciate  assistance.   -- Acute encephalopathy:    --CTH 5/3 ordered per medicine team for some confusion: without evidence of acute hemorrhage.    --Hypernatremia: resolved Na 144 today, continue to monitor daily   --BLE US wo DVT, UA neg, CXR negative for acute processes  --Agitation:    -Restraints replaced 5/7 due to combative agitation. Wean restraints and use redirection in place of restraints as able.   -Scheduled 25 mg Seroquel qhs. Monitor QTc. Daily EKG.   -- Bilateral hydroureteronephrosis: Catheter in place. Urology  following.   -- History of antiphospholipid syndrome and PE: hold warfarin. Heme/onc following.   -- SLP recommending regular diet with nectar thick liquids.  -- Continue BRs  -- Activity: PT/OT recs for rehab  -- DVT prophylaxis: TEDs/SCDs/SQH      Dispo: Pending rehab, referrals sent        Aidan Espitia MD  Neurosurgery  [facility] shamar - Neurosurgery ([facility])

## 2022-05-08 NOTE — SUBJECTIVE & OBJECTIVE
Interval History: NAEON. Afebrile, HD stable. Pt with >3L UOP in the last 24hrs. Encourage enteral water intake. Waxing/waning confusion. Per patient's wife, states mentation improving. This AM patient tearful, wanting to go home. Per nursing, they state patient slept overnight with the seroquel 25mg.    Review of Systems   Constitutional:  Positive for fatigue. Negative for chills and fever.   HENT:  Negative for congestion and sore throat.    Respiratory:  Negative for cough and shortness of breath.    Cardiovascular:  Negative for chest pain, palpitations and leg swelling.   Gastrointestinal:  Negative for abdominal distention, abdominal pain, constipation, diarrhea, nausea and vomiting.   Endocrine: Positive for cold intolerance.   Genitourinary:  Negative for dysuria and frequency.   Musculoskeletal:  Negative for back pain and myalgias.   Skin:  Negative for color change and rash.   Neurological:  Negative for light-headedness and headaches.   Psychiatric/Behavioral:  Positive for confusion and decreased concentration. Negative for hallucinations. The patient is nervous/anxious.    Objective:     Vital Signs (Most Recent):  Temp: 97.8 °F (36.6 °C) (05/08/22 0407)  Pulse: 87 (05/08/22 0842)  Resp: 20 (05/08/22 0842)  BP: (!) 142/73 (05/08/22 0407)  SpO2: 97 % (05/08/22 0842)   Vital Signs (24h Range):  Temp:  [97.8 °F (36.6 °C)-98.4 °F (36.9 °C)] 97.8 °F (36.6 °C)  Pulse:  [60-87] 87  Resp:  [18-20] 20  SpO2:  [93 %-99 %] 97 %  BP: (125-160)/(60-76) 142/73     Weight: 74.8 kg (165 lb)  Body mass index is 24.37 kg/m².    Intake/Output Summary (Last 24 hours) at 5/8/2022 1048  Last data filed at 5/8/2022 0900  Gross per 24 hour   Intake 728 ml   Output 5875 ml   Net -5147 ml        Physical Exam  Constitutional:       General: He is not in acute distress.     Appearance: He is not ill-appearing or toxic-appearing.   HENT:      Head: Normocephalic and atraumatic.      Nose: No congestion or rhinorrhea.       Mouth/Throat:      Mouth: Mucous membranes are dry.      Pharynx: Oropharynx is clear.   Eyes:      General: No scleral icterus.     Extraocular Movements: Extraocular movements intact.      Conjunctiva/sclera: Conjunctivae normal.   Cardiovascular:      Rate and Rhythm: Normal rate and regular rhythm.      Pulses: Normal pulses.      Heart sounds: Normal heart sounds.   Pulmonary:      Effort: Pulmonary effort is normal.      Breath sounds: Normal breath sounds. No wheezing.   Abdominal:      General: Abdomen is flat. Bowel sounds are normal. There is no distension.      Palpations: Abdomen is soft.      Tenderness: There is no abdominal tenderness. There is no guarding or rebound.   Genitourinary:     Comments: Floyd in place  Musculoskeletal:         General: No swelling.      Right lower leg: No edema.      Left lower leg: No edema.   Skin:     General: Skin is warm.      Coloration: Skin is not jaundiced.   Neurological:      General: No focal deficit present.      Mental Status: He is alert.      Comments: Oriented to person, intermittently to time and place  Follows commands appropriately  Strength 4/5 throughout  Sensation intact throughout   Psychiatric:         Mood and Affect: Mood normal.         Behavior: Behavior normal.       Significant Labs: All pertinent labs within the past 24 hours have been reviewed.    Significant Imaging: I have reviewed all pertinent imaging results/findings within the past 24 hours.

## 2022-05-08 NOTE — PT/OT/SLP PROGRESS
Physical Therapy Treatment    Patient Name:  Diego Gunter   MRN:  [id number]    Recommendations:     Discharge Recommendations:  rehabilitation facility   Discharge Equipment Recommendations:  (TBD)   Barriers to discharge: requires an increased level of assistance    Assessment:     Diego Gunter is a 81 y.o. male admitted with a medical diagnosis of Epidural hematoma.  He presents with the following impairments/functional limitations:  weakness, impaired endurance, impaired self care skills, impaired functional mobilty, gait instability, impaired balance, impaired cognition, decreased coordination, decreased lower extremity function, decreased upper extremity function, decreased safety awareness, orthopedic precautions, decreased ROM, impaired joint extensibility.    Pt met supine with HOB slightly elevated, family present, and agreeable to participate in PT treatment on this date. Pt requires between minimum to maximum assistance with bed mobility, transfers, and ambulation.Overall, pt tolerated the session well with good participation. Pt continues to show progress toward an improved level of functional mobility.    Rehab Prognosis: Good; patient would benefit from acute skilled PT services to address these deficits and reach maximum level of function.    Recent Surgery: Procedure(s) (LRB):  LAMINECTOMY, SPINE, CERVICAL, POSTERIOR APPROACH C3-6 (Bilateral) 10 Days Post-Op    Plan:     During this hospitalization, patient to be seen 4 x/week to address the identified rehab impairments via gait training, therapeutic activities, therapeutic exercises, neuromuscular re-education and progress toward the following goals:    · Plan of Care Expires:  05/30/22    Subjective     Chief Complaint: anxious  Pain/Comfort:  · Pain Rating 1: 0/10  · Pain Rating Post-Intervention 1: 0/10      Objective:     Communicated with pt's nurse prior to session.  Patient found supine with HOB elevated with telemetry, amaya catheter  upon PT entry to room.     General Precautions: Standard, fall, aspiration   Orthopedic Precautions:spinal precautions   Braces: Cervical collar  Respiratory Status: Room air     Functional Mobility:  · Bed Mobility:     · Rolling Left:  minimum assistance  · Scooting to EOB: moderate assistance   · Supine to Sit: maximal assistance for log roll technique to (L)  · Sit to Supine: moderate assistance for LE management  · Transfers:     · Sit <> Stand:  moderate assistance of 2 persons, progressing to minimum assistance of 2, 3 trials  · 1st trial: mod (A) with rolling walker  · 2nd trial: min (A) with rolling walker  · 3rd trial: min (A) with HHA  · Gait: Pt ambulates ~6 ft forward and backwards with moderate to maximum assistance using the rolling walker. Pt also was able to take 4 side steps to (L) toward the HOB with minimum assistance using hand-held assist. Pt demonstrates ataxic gait, B knee buckling, impaired sequencing, and decreased weight-shifting. Faciliation provided for knee extension, upright posture, and weight-shifting and verbal cues for sequencing. All lines remained intact throughout ambulation trial.  · Balance:   · Static sitting: CGA  · Dynamic sitting: min (A) with brief episodes of mod (A) secondary to patient with posterior lean. Cued for weight-shifting and core engagement  · Static Standing: min (A)  · Dynamic standing: mod to max (A), patient demonstrates unsteadiness, decreased knee extension and weight shifting      AM-PAC 6 CLICK MOBILITY  Turning over in bed (including adjusting bedclothes, sheets and blankets)?: 3  Sitting down on and standing up from a chair with arms (e.g., wheelchair, bedside commode, etc.): 3  Moving from lying on back to sitting on the side of the bed?: 2  Moving to and from a bed to a chair (including a wheelchair)?: 2  Need to walk in hospital room?: 2  Climbing 3-5 steps with a railing?: 1  Basic Mobility Total Score: 13       Therapeutic Activities and  Exercises:  Pt educated on the goals of the session, spinals precautions, importance of wearing the brace when OOB, and PT POC.    Patient left HOB elevated with all lines intact, call button in reach, bed alarm on, RN notified and family present.    GOALS:   Multidisciplinary Problems     Physical Therapy Goals        Problem: Physical Therapy    Goal Priority Disciplines Outcome Goal Variances Interventions   Physical Therapy Goal     PT, PT/OT Ongoing, Progressing     Description: Goals to be met by: 2022     Patient will increase functional independence with mobility by performin. Supine to sit with minimum assistance  2. Sit to supine with minimum assistance - met 2022  Updated: supervision while maintaining spinal precautions   3. Sit to stand transfer with minimum assistance - met 2022  Updated: supervision   4. Bed to chair transfer with minimum assistance using LRAD as needed  5. Gait  x 25 feet with minimum assistance using LRAD as needed  6. Lower extremity exercise program x10 reps per handout, with independence  7. Recall 3/3 spinal precautions and how to don/doff c-collar with supervision                    Time Tracking:     PT Received On: 22  PT Start Time: 1347     PT Stop Time: 1427  PT Total Time (min): 40 min     Billable Minutes: Gait Training 10 and Therapeutic Activity 30    Treatment Type: Treatment  PT/PTA: PTA     PTA Visit Number: 2     2022

## 2022-05-08 NOTE — SUBJECTIVE & OBJECTIVE
Interval History: Continue agitation and confusion continue nightly Seroquel. Pending IPR     Medications:  Continuous Infusions:   lactated ringers 125 mL/hr at 05/08/22 0215     Scheduled Meds:   acetaminophen  1,000 mg Oral TID    albuterol-ipratropium  3 mL Nebulization Q6H    azithromycin  500 mg Oral Daily    dexAMETHasone  2 mg Oral Q6H    Followed by    [START ON 5/9/2022] dexAMETHasone  2 mg Oral Q8H    Followed by    [START ON 5/10/2022] dexAMETHasone  2 mg Oral Q12H    Followed by    [START ON 5/13/2022] dexAMETHasone  2 mg Oral Daily    ethambutoL  800 mg Oral Daily    famotidine  20 mg Oral BID    heparin (porcine)  5,000 Units Subcutaneous Q8H    LIDOcaine  1 patch Transdermal Q24H    melatonin  9 mg Oral Nightly    morphine  2 mg Intravenous Once    polyethylene glycol  17 g Oral Daily    pravastatin  10 mg Oral Daily    QUEtiapine  25 mg Oral QHS    senna  8.6 mg Oral BID    silodosin  4 mg Oral Daily    sodium chloride 3%  4 mL Nebulization BID     PRN Meds:acetaminophen, dextrose 10%, dextrose 10%, glucagon (human recombinant), insulin aspart U-100, lactulose, ondansetron, oxyCODONE     Review of Systems  Objective:     Weight: 74.8 kg (165 lb)  Body mass index is 24.37 kg/m².  Vital Signs (Most Recent):  Temp: 98.4 °F (36.9 °C) (05/08/22 1100)  Pulse: 64 (05/08/22 1100)  Resp: 20 (05/08/22 0842)  BP: 134/69 (05/08/22 1100)  SpO2: 97 % (05/08/22 1100)   Vital Signs (24h Range):  Temp:  [97.8 °F (36.6 °C)-98.4 °F (36.9 °C)] 98.4 °F (36.9 °C)  Pulse:  [63-87] 64  Resp:  [18-20] 20  SpO2:  [93 %-99 %] 97 %  BP: (125-160)/(60-76) 134/69     Date 05/08/22 0700 - 05/09/22 0659   Shift 8498-9458 2977-8070 6864-0034 24 Hour Total   INTAKE   Shift Total(mL/kg)       OUTPUT   Urine(mL/kg/hr) 2500   2500   Shift Total(mL/kg) 2500(33.4)   2500(33.4)   Weight (kg) 74.8 74.8 74.8 74.8                        Urethral Catheter 05/05/22 0200 Straight-tip;Non-latex (Active)   Site Assessment Clean;Intact 05/07/22  "0400   Collection Container Standard drainage bag 05/07/22 0400   Securement Method secured to top of thigh w/ adhesive device 05/07/22 0400   Catheter Care Performed yes 05/07/22 0400   Reason for Continuing Urinary Catheterization Urinary retention 05/07/22 0400   CAUTI Prevention Bundle Securement Device in place with 1" slack;Intact seal between catheter & drainage tubing;Sheeting clip in use;Drainage bag/urimeter below bladder;Drainage bag/urimeter off the floor;No dependent loops or kinks;Drainage bag/urimeter not overfilled (<2/3 full) 05/06/22 2000   Output (mL) 2275 mL 05/06/22 1800     Neurosurgery Physical Exam  General: well developed, well nourished, no distress.   Head: normocephalic, atraumatic  Neurologic: Alert and oriented. Thought content somewhat appropriate. Some conversation hard to follow, patient with higher level confusion  Mental Status: Awake, Alert, Oriented x 3 (not oriented to year). Higher level confusion.   Language: No aphasia  Speech: No dysarthria  Cranial nerves: face symmetric, tongue midline, CN II-XII grossly intact.   Eyes: pupils equal, round, reactive to light with accommodation, EOMI.   Pulmonary: normal respirations, no signs of respiratory distress  Abdomen: soft,slightly distended  Skin: Skin is warm, dry and intact.  Sensory: intact to light touch throughout    Motor Strength:Moves all extremities spontaneously with good tone.  Full strength upper and lower extremities. No abnormal movements seen.      Strength   Deltoids Triceps Biceps Wrist Extension Wrist Flexion Hand    Upper: R 5/5 5/5 5/5 5/5 5/5 5/5     L 5/5 5/5 5/5 5/5 5/5 5/5       Iliopsoas Quadriceps Knee  Flexion Tibialis  anterior Gastro- cnemius EHL   Lower: R 5/5 5/5 5/5 5/5 5/5 5/5     L 5/5 5/5 5/5 5/5 5/5 5/5      Posterior cervical incision: Clean, dry, staples intact.     Significant Labs:  Recent Labs   Lab 05/07/22  0827 05/07/22 2026 05/08/22  0708 05/08/22  0750     --  143*  --  "   * 142 142 144   K 3.8  --  3.8  --    *  --  110  --    CO2 25  --  25  --    BUN 12  --  11  --    CREATININE 0.8  --  0.7  --    CALCIUM 9.0  --  9.0  --    MG 1.8  --  1.8  --        Recent Labs   Lab 05/07/22 0828 05/08/22  0708   WBC 16.60* 16.64*   HGB 11.1* 10.8*   HCT 34.8* 33.9*    240       Recent Labs   Lab 05/07/22 0828 05/08/22  0708   INR 1.1 1.0   APTT 28.8 28.7       Microbiology Results (last 7 days)       Procedure Component Value Units Date/Time    Blood culture [[id number]] Collected: 05/03/22 1542    Order Status: Completed Specimen: Blood from Peripheral, Left Hand Updated: 05/07/22 1612     Blood Culture, Routine No Growth to date      No Growth to date      No Growth to date      No Growth to date      No Growth to date    Narrative:      Collection has been rescheduled by [facility] at 05/03/2022 12:38 Reason:   Patient unavailable going yo ct will yry back  Collection has been rescheduled by [facility] at 05/03/2022 12:41 Reason:   Spe with sabino stanton Patient unavailable going to ct  Collection has been rescheduled by [facility] at 05/03/2022 14:03 Reason:   Patient unavailable in ultrasound sabino valenzuela said come back in an hr   Collection has been rescheduled by [facility] at 05/03/2022 12:38 Reason:   Patient unavailable going yo ct will yry back  Collection has been rescheduled by [facility] at 05/03/2022 12:41 Reason:   Spe with sabino stanton Patient unavailable going to ct  Collection has been rescheduled by [facility] at 05/03/2022 14:03 Reason:   Patient unavailable in ultrasound sabino valenzuela said come back in an hr     Blood culture [[id number]] Collected: 05/03/22 1543    Order Status: Completed Specimen: Blood from Peripheral, Right Hand Updated: 05/07/22 1612     Blood Culture, Routine No Growth to date      No Growth to date      No Growth to date      No Growth to date      No Growth to date    Narrative:      Collection has been rescheduled by [facility] at 05/03/2022 12:38 Reason:   Patient unavailable going yo ct  will yry back  Collection has been rescheduled by 6 at 05/03/2022 12:41 Reason:   Spe with rn romy Patient unavailable going to ct  Collection has been rescheduled by [facility] at 05/03/2022 14:03 Reason:   Patient unavailable in ultrasound sabino valenzuela said come back in an hr   Collection has been rescheduled by [facility] at 05/03/2022 12:38 Reason:   Patient unavailable going yo ct will yry back  Collection has been rescheduled by [facility] at 05/03/2022 12:41 Reason:   Spe with rn romy Patient unavailable going to ct  Collection has been rescheduled by [facility] at 05/03/2022 14:03 Reason:   Patient unavailable in ultrasound sabino valenzuela said come back in an hr     Culture, Anaerobe [[id number]]     Order Status: Completed Specimen: Neck     Aerobic culture [[id number]]     Order Status: Completed Specimen: Neck     AFB Culture & Smear [[id number]]     Order Status: No result Specimen: Respiratory from Sputum, Expectorated     Culture, Respiratory with Gram Stain [[id number]]     Order Status: No result Specimen: Respiratory     Blood culture [[id number]] Collected: 04/28/22 1201    Order Status: Completed Specimen: Blood from Peripheral, Forearm, Right Updated: 05/02/22 1303     Blood Culture, Routine No Growth after 4 days.     Blood culture [[id number]] Collected: 04/28/22 1201    Order Status: Completed Specimen: Blood from Peripheral, Hand, Left Updated: 05/02/22 1303     Blood Culture, Routine No Growth after 4 days.           All pertinent labs from the last 24 hours have been reviewed.    Significant Diagnostics:  I have reviewed all pertinent imaging results/findings within the past 24 hours.  Physical Exam

## 2022-05-08 NOTE — PLAN OF CARE
Per  neurosurgery C collar to be worn when pt OOB. Please see flowsheets for assessment and MAR for medication administration.     Problem: Adult Inpatient Plan of Care  Goal: Plan of Care Review  Outcome: Ongoing, Progressing  Goal: Patient-Specific Goal (Individualized)  Description: Admit Date: 2022    Epidural hematoma    Past Medical History:  2021: Antiphospholipid syndrome  No date: Dysphagia  No date: Hx of colonic polyps      Comment:  followed by GI. Dr. Pham  No date: Hyperlipidemia LDL goal <100  No date: Overweight(278.02)  2011: Prostate cancer      Comment:  followed by urology, Dr. Rogers  No date: Type II or unspecified type diabetes mellitus without   mention of complication, not stated as uncontrolled      Comment:  diet controlled    Past Surgical History:  10/15/2018: BRONCHOSCOPY; N/A      Comment:  Procedure: BRONCHOSCOPY;  Surgeon: Pratibha Diagnostic                Provider;  Location: [facility] OR [address] [place] [address];  Service:                Anesthesiology;  Laterality: N/A;  : CATARACT EXTRACTION, BILATERAL  2022: POSTERIOR CERVICAL LAMINECTOMY; Bilateral      Comment:  Procedure: LAMINECTOMY, SPINE, CERVICAL, POSTERIOR                APPROACH C3-6;  Surgeon: Carlos Miller MD;  Location:                [address];  Service: Neurosurgery;  Laterality:                Bilateral;  No date: PROSTATECTOMY    Individualization:   1. Dim lights at night  2. Pt likes blanket off  3. Pt does not like wedge under feet    Restraints:   Restraint Order  Length of Order: Order good for next 24 hours or when removed.  Date that the current order will : 22  Time that the current order will :   Order Upon Application: Yes         Outcome: Ongoing, Progressing  Goal: Absence of Hospital-Acquired Illness or Injury  Outcome: Ongoing, Progressing  Goal: Optimal Comfort and Wellbeing  Outcome: Ongoing, Progressing  Goal: Readiness for Transition of Care  Outcome:  Ongoing, Progressing     Problem: Adjustment to Illness (Stroke, Ischemic/Transient Ischemic Attack)  Goal: Optimal Coping  Outcome: Ongoing, Progressing     Problem: Bowel Elimination Impaired (Stroke, Ischemic/Transient Ischemic Attack)  Goal: Effective Bowel Elimination  Outcome: Ongoing, Progressing     Problem: Cerebral Tissue Perfusion (Stroke, Ischemic/Transient Ischemic Attack)  Goal: Optimal Cerebral Tissue Perfusion  Outcome: Ongoing, Progressing     Problem: Cognitive Impairment (Stroke, Ischemic/Transient Ischemic Attack)  Goal: Optimal Cognitive Function  Outcome: Ongoing, Progressing     Problem: Communication Impairment (Stroke, Ischemic/Transient Ischemic Attack)  Goal: Improved Communication Skills  Outcome: Ongoing, Progressing     Problem: Functional Ability Impaired (Stroke, Ischemic/Transient Ischemic Attack)  Goal: Optimal Functional Ability  Outcome: Ongoing, Progressing     Problem: Respiratory Compromise (Stroke, Ischemic/Transient Ischemic Attack)  Goal: Effective Oxygenation and Ventilation  Outcome: Ongoing, Progressing     Problem: Sensorimotor Impairment (Stroke, Ischemic/Transient Ischemic Attack)  Goal: Improved Sensorimotor Function  Outcome: Ongoing, Progressing     Problem: Swallowing Impairment (Stroke, Ischemic/Transient Ischemic Attack)  Goal: Optimal Eating and Swallowing without Aspiration  Outcome: Ongoing, Progressing     Problem: Urinary Elimination Impaired (Stroke, Ischemic/Transient Ischemic Attack)  Goal: Effective Urinary Elimination  Outcome: Ongoing, Progressing     Problem: Fall Injury Risk  Goal: Absence of Fall and Fall-Related Injury  Outcome: Ongoing, Progressing     Problem: Infection  Goal: Absence of Infection Signs and Symptoms  Outcome: Ongoing, Progressing     Problem: Diabetes Comorbidity  Goal: Blood Glucose Level Within Targeted Range  Outcome: Ongoing, Progressing     Problem: Adjustment to Illness (Stroke, Hemorrhagic)  Goal: Optimal Coping  Outcome:  Ongoing, Progressing     Problem: Bowel Elimination Impaired (Stroke, Hemorrhagic)  Goal: Effective Bowel Elimination  Outcome: Ongoing, Progressing     Problem: Cerebral Tissue Perfusion (Stroke, Hemorrhagic)  Goal: Optimal Cerebral Tissue Perfusion  Outcome: Ongoing, Progressing     Problem: Cognitive Impairment (Stroke, Hemorrhagic)  Goal: Optimal Cognitive Function  Outcome: Ongoing, Progressing     Problem: Communication Impairment (Stroke, Hemorrhagic)  Goal: Effective Communication Skills  Outcome: Ongoing, Progressing     Problem: Functional Ability Impaired (Stroke, Hemorrhagic)  Goal: Optimal Functional Ability  Outcome: Ongoing, Progressing     Problem: Pain (Stroke, Hemorrhagic)  Goal: Acceptable Pain Control  Outcome: Ongoing, Progressing     Problem: Respiratory Compromise (Stroke, Hemorrhagic)  Goal: Effective Oxygenation and Ventilation  Outcome: Ongoing, Progressing     Problem: Sensorimotor Impairment (Stroke, Hemorrhagic)  Goal: Improved Sensorimotor Function  Outcome: Ongoing, Progressing     Problem: Swallowing Impairment (Stroke, Hemorrhagic)  Goal: Optimal Eating and Swallowing Without Aspiration  Outcome: Ongoing, Progressing     Problem: Urinary Elimination Impaired (Stroke, Hemorrhagic)  Goal: Effective Urinary Elimination  Outcome: Ongoing, Progressing     Problem: Adjustment to Illness (Delirium)  Goal: Optimal Coping  Outcome: Ongoing, Progressing     Problem: Altered Behavior (Delirium)  Goal: Improved Behavioral Control  Outcome: Ongoing, Progressing     Problem: Attention and Thought Clarity Impairment (Delirium)  Goal: Improved Attention and Thought Clarity  Outcome: Ongoing, Progressing     Problem: Sleep Disturbance (Delirium)  Goal: Improved Sleep  Outcome: Ongoing, Progressing     Problem: Restraint, Nonbehavioral (Nonviolent)  Goal: Absence of Harm or Injury  Outcome: Ongoing, Progressing     Problem: Skin Injury Risk Increased  Goal: Skin Health and Integrity  Outcome: Ongoing,  Progressing

## 2022-05-09 LAB
ALBUMIN SERPL BCP-MCNC: 3.6 G/DL (ref 3.5–5.2)
ALP SERPL-CCNC: 97 U/L (ref 55–135)
ALT SERPL W/O P-5'-P-CCNC: 96 U/L (ref 10–44)
ANION GAP SERPL CALC-SCNC: 9 MMOL/L (ref 8–16)
APTT BLDCRRT: 25.4 SEC (ref 21–32)
AST SERPL-CCNC: 63 U/L (ref 10–40)
BASOPHILS # BLD AUTO: 0.06 K/UL (ref 0–0.2)
BASOPHILS NFR BLD: 0.3 % (ref 0–1.9)
BILIRUB SERPL-MCNC: 0.5 MG/DL (ref 0.1–1)
BILIRUB UR QL STRIP: NEGATIVE
BUN SERPL-MCNC: 16 MG/DL (ref 8–23)
CALCIUM SERPL-MCNC: 9.2 MG/DL (ref 8.7–10.5)
CHLORIDE SERPL-SCNC: 110 MMOL/L (ref 95–110)
CLARITY UR REFRACT.AUTO: CLEAR
CO2 SERPL-SCNC: 26 MMOL/L (ref 23–29)
COLOR UR AUTO: NORMAL
CREAT SERPL-MCNC: 0.8 MG/DL (ref 0.5–1.4)
DIFFERENTIAL METHOD: ABNORMAL
EOSINOPHIL # BLD AUTO: 0 K/UL (ref 0–0.5)
EOSINOPHIL NFR BLD: 0 % (ref 0–8)
ERYTHROCYTE [DISTWIDTH] IN BLOOD BY AUTOMATED COUNT: 16.6 % (ref 11.5–14.5)
EST. GFR  (AFRICAN AMERICAN): >60 ML/MIN/1.73 M^2
EST. GFR  (NON AFRICAN AMERICAN): >60 ML/MIN/1.73 M^2
GLUCOSE SERPL-MCNC: 138 MG/DL (ref 70–110)
GLUCOSE UR QL STRIP: NEGATIVE
HCT VFR BLD AUTO: 36.2 % (ref 40–54)
HGB BLD-MCNC: 11.7 G/DL (ref 14–18)
HGB UR QL STRIP: NEGATIVE
IMM GRANULOCYTES # BLD AUTO: 0.61 K/UL (ref 0–0.04)
IMM GRANULOCYTES NFR BLD AUTO: 3.5 % (ref 0–0.5)
INR PPP: 1 (ref 0.8–1.2)
KETONES UR QL STRIP: NEGATIVE
LEUKOCYTE ESTERASE UR QL STRIP: NEGATIVE
LYMPHOCYTES # BLD AUTO: 1.5 K/UL (ref 1–4.8)
LYMPHOCYTES NFR BLD: 8.8 % (ref 18–48)
MAGNESIUM SERPL-MCNC: 2.1 MG/DL (ref 1.6–2.6)
MCH RBC QN AUTO: 28.5 PG (ref 27–31)
MCHC RBC AUTO-ENTMCNC: 32.3 G/DL (ref 32–36)
MCV RBC AUTO: 88 FL (ref 82–98)
MONOCYTES # BLD AUTO: 0.8 K/UL (ref 0.3–1)
MONOCYTES NFR BLD: 4.4 % (ref 4–15)
NEUTROPHILS # BLD AUTO: 14.4 K/UL (ref 1.8–7.7)
NEUTROPHILS NFR BLD: 83 % (ref 38–73)
NITRITE UR QL STRIP: NEGATIVE
NRBC BLD-RTO: 0 /100 WBC
PH UR STRIP: 7 [PH] (ref 5–8)
PHOSPHATE SERPL-MCNC: 3.5 MG/DL (ref 2.7–4.5)
PLATELET # BLD AUTO: 287 K/UL (ref 150–450)
PMV BLD AUTO: 11.2 FL (ref 9.2–12.9)
POCT GLUCOSE: 154 MG/DL (ref 70–110)
POTASSIUM SERPL-SCNC: 4.2 MMOL/L (ref 3.5–5.1)
PROT SERPL-MCNC: 6.7 G/DL (ref 6–8.4)
PROT UR QL STRIP: NEGATIVE
PROTHROMBIN TIME: 10.4 SEC (ref 9–12.5)
RBC # BLD AUTO: 4.1 M/UL (ref 4.6–6.2)
RPR SER QL: NORMAL
SODIUM SERPL-SCNC: 140 MMOL/L (ref 136–145)
SODIUM SERPL-SCNC: 144 MMOL/L (ref 136–145)
SODIUM SERPL-SCNC: 145 MMOL/L (ref 136–145)
SP GR UR STRIP: 1.01 (ref 1–1.03)
URN SPEC COLLECT METH UR: NORMAL
WBC # BLD AUTO: 17.4 K/UL (ref 3.9–12.7)

## 2022-05-09 PROCEDURE — 63600175 PHARM REV CODE 636 W HCPCS: Performed by: PHYSICIAN ASSISTANT

## 2022-05-09 PROCEDURE — 94640 AIRWAY INHALATION TREATMENT: CPT

## 2022-05-09 PROCEDURE — 92526 ORAL FUNCTION THERAPY: CPT

## 2022-05-09 PROCEDURE — 83735 ASSAY OF MAGNESIUM: CPT | Performed by: STUDENT IN AN ORGANIZED HEALTH CARE EDUCATION/TRAINING PROGRAM

## 2022-05-09 PROCEDURE — 25000003 PHARM REV CODE 250: Performed by: HOSPITALIST

## 2022-05-09 PROCEDURE — 25000242 PHARM REV CODE 250 ALT 637 W/ HCPCS: Performed by: STUDENT IN AN ORGANIZED HEALTH CARE EDUCATION/TRAINING PROGRAM

## 2022-05-09 PROCEDURE — 93010 EKG 12-LEAD: ICD-10-PCS | Mod: ,,, | Performed by: INTERNAL MEDICINE

## 2022-05-09 PROCEDURE — 25000003 PHARM REV CODE 250

## 2022-05-09 PROCEDURE — 97535 SELF CARE MNGMENT TRAINING: CPT

## 2022-05-09 PROCEDURE — 25000003 PHARM REV CODE 250: Performed by: PHYSICIAN ASSISTANT

## 2022-05-09 PROCEDURE — 25000003 PHARM REV CODE 250: Performed by: STUDENT IN AN ORGANIZED HEALTH CARE EDUCATION/TRAINING PROGRAM

## 2022-05-09 PROCEDURE — 25500020 PHARM REV CODE 255: Performed by: NEUROLOGICAL SURGERY

## 2022-05-09 PROCEDURE — 94761 N-INVAS EAR/PLS OXIMETRY MLT: CPT

## 2022-05-09 PROCEDURE — 93010 ELECTROCARDIOGRAM REPORT: CPT | Mod: ,,, | Performed by: INTERNAL MEDICINE

## 2022-05-09 PROCEDURE — 85730 THROMBOPLASTIN TIME PARTIAL: CPT | Performed by: STUDENT IN AN ORGANIZED HEALTH CARE EDUCATION/TRAINING PROGRAM

## 2022-05-09 PROCEDURE — 99024 PR POST-OP FOLLOW-UP VISIT: ICD-10-PCS | Mod: ,,, | Performed by: PHYSICIAN ASSISTANT

## 2022-05-09 PROCEDURE — 36415 COLL VENOUS BLD VENIPUNCTURE: CPT | Performed by: STUDENT IN AN ORGANIZED HEALTH CARE EDUCATION/TRAINING PROGRAM

## 2022-05-09 PROCEDURE — 92507 TX SP LANG VOICE COMM INDIV: CPT

## 2022-05-09 PROCEDURE — 84100 ASSAY OF PHOSPHORUS: CPT | Performed by: STUDENT IN AN ORGANIZED HEALTH CARE EDUCATION/TRAINING PROGRAM

## 2022-05-09 PROCEDURE — 84295 ASSAY OF SERUM SODIUM: CPT

## 2022-05-09 PROCEDURE — 97530 THERAPEUTIC ACTIVITIES: CPT

## 2022-05-09 PROCEDURE — 97530 THERAPEUTIC ACTIVITIES: CPT | Mod: CQ

## 2022-05-09 PROCEDURE — 81003 URINALYSIS AUTO W/O SCOPE: CPT | Performed by: PHYSICIAN ASSISTANT

## 2022-05-09 PROCEDURE — 11000001 HC ACUTE MED/SURG PRIVATE ROOM

## 2022-05-09 PROCEDURE — 99233 SBSQ HOSP IP/OBS HIGH 50: CPT | Mod: GC,,, | Performed by: HOSPITALIST

## 2022-05-09 PROCEDURE — 85610 PROTHROMBIN TIME: CPT | Performed by: STUDENT IN AN ORGANIZED HEALTH CARE EDUCATION/TRAINING PROGRAM

## 2022-05-09 PROCEDURE — 63600175 PHARM REV CODE 636 W HCPCS: Performed by: STUDENT IN AN ORGANIZED HEALTH CARE EDUCATION/TRAINING PROGRAM

## 2022-05-09 PROCEDURE — 85025 COMPLETE CBC W/AUTO DIFF WBC: CPT | Performed by: STUDENT IN AN ORGANIZED HEALTH CARE EDUCATION/TRAINING PROGRAM

## 2022-05-09 PROCEDURE — 99024 POSTOP FOLLOW-UP VISIT: CPT | Mod: ,,, | Performed by: PHYSICIAN ASSISTANT

## 2022-05-09 PROCEDURE — 97116 GAIT TRAINING THERAPY: CPT | Mod: CQ

## 2022-05-09 PROCEDURE — 93005 ELECTROCARDIOGRAM TRACING: CPT

## 2022-05-09 PROCEDURE — A9585 GADOBUTROL INJECTION: HCPCS | Performed by: NEUROLOGICAL SURGERY

## 2022-05-09 PROCEDURE — 63700000 PHARM REV CODE 250 ALT 637 W/O HCPCS: Performed by: STUDENT IN AN ORGANIZED HEALTH CARE EDUCATION/TRAINING PROGRAM

## 2022-05-09 PROCEDURE — 80053 COMPREHEN METABOLIC PANEL: CPT | Performed by: STUDENT IN AN ORGANIZED HEALTH CARE EDUCATION/TRAINING PROGRAM

## 2022-05-09 PROCEDURE — 99233 PR SUBSEQUENT HOSPITAL CARE,LEVL III: ICD-10-PCS | Mod: GC,,, | Performed by: HOSPITALIST

## 2022-05-09 RX ORDER — QUETIAPINE FUMARATE 25 MG/1
50 TABLET, FILM COATED ORAL NIGHTLY
Status: DISCONTINUED | OUTPATIENT
Start: 2022-05-09 | End: 2022-05-13 | Stop reason: HOSPADM

## 2022-05-09 RX ORDER — QUETIAPINE FUMARATE 25 MG/1
25 TABLET, FILM COATED ORAL EVERY 8 HOURS PRN
Status: DISCONTINUED | OUTPATIENT
Start: 2022-05-09 | End: 2022-05-13 | Stop reason: HOSPADM

## 2022-05-09 RX ADMIN — Medication 9 MG: at 08:05

## 2022-05-09 RX ADMIN — IPRATROPIUM BROMIDE AND ALBUTEROL SULFATE 3 ML: 2.5; .5 SOLUTION RESPIRATORY (INHALATION) at 01:05

## 2022-05-09 RX ADMIN — SENNOSIDES 8.6 MG: 8.6 TABLET ORAL at 09:05

## 2022-05-09 RX ADMIN — SODIUM CHLORIDE SOLN NEBU 3% 4 ML: 3 NEBU SOLN at 08:05

## 2022-05-09 RX ADMIN — AZITHROMYCIN MONOHYDRATE 500 MG: 250 TABLET ORAL at 09:05

## 2022-05-09 RX ADMIN — FAMOTIDINE 20 MG: 20 TABLET ORAL at 09:05

## 2022-05-09 RX ADMIN — SODIUM CHLORIDE SOLN NEBU 3% 4 ML: 3 NEBU SOLN at 10:05

## 2022-05-09 RX ADMIN — IPRATROPIUM BROMIDE AND ALBUTEROL SULFATE 3 ML: 2.5; .5 SOLUTION RESPIRATORY (INHALATION) at 08:05

## 2022-05-09 RX ADMIN — POLYETHYLENE GLYCOL 3350 17 G: 17 POWDER, FOR SOLUTION ORAL at 09:05

## 2022-05-09 RX ADMIN — ETHAMBUTOL HYDROCHLORIDE 800 MG: 400 TABLET, FILM COATED ORAL at 09:05

## 2022-05-09 RX ADMIN — ACETAMINOPHEN 1000 MG: 500 TABLET ORAL at 04:05

## 2022-05-09 RX ADMIN — SILODOSIN 4 MG: 4 CAPSULE ORAL at 09:05

## 2022-05-09 RX ADMIN — VALPROIC ACID 250 MG: 250 SOLUTION ORAL at 02:05

## 2022-05-09 RX ADMIN — FAMOTIDINE 20 MG: 20 TABLET ORAL at 08:05

## 2022-05-09 RX ADMIN — THERA TABS 1 TABLET: TAB at 09:05

## 2022-05-09 RX ADMIN — HEPARIN SODIUM 5000 UNITS: 5000 INJECTION INTRAVENOUS; SUBCUTANEOUS at 05:05

## 2022-05-09 RX ADMIN — IPRATROPIUM BROMIDE AND ALBUTEROL SULFATE 3 ML: 2.5; .5 SOLUTION RESPIRATORY (INHALATION) at 10:05

## 2022-05-09 RX ADMIN — FOLIC ACID 1 MG: 1 TABLET ORAL at 09:05

## 2022-05-09 RX ADMIN — DEXAMETHASONE 2 MG: 1 TABLET ORAL at 05:05

## 2022-05-09 RX ADMIN — DEXAMETHASONE 2 MG: 1 TABLET ORAL at 04:05

## 2022-05-09 RX ADMIN — QUETIAPINE FUMARATE 25 MG: 25 TABLET ORAL at 09:05

## 2022-05-09 RX ADMIN — ACETAMINOPHEN 1000 MG: 500 TABLET ORAL at 09:05

## 2022-05-09 RX ADMIN — HEPARIN SODIUM 5000 UNITS: 5000 INJECTION INTRAVENOUS; SUBCUTANEOUS at 04:05

## 2022-05-09 RX ADMIN — THIAMINE HCL TAB 100 MG 100 MG: 100 TAB at 09:05

## 2022-05-09 RX ADMIN — QUETIAPINE FUMARATE 50 MG: 25 TABLET ORAL at 08:05

## 2022-05-09 RX ADMIN — SENNOSIDES 8.6 MG: 8.6 TABLET ORAL at 08:05

## 2022-05-09 RX ADMIN — DEXAMETHASONE 2 MG: 1 TABLET ORAL at 12:05

## 2022-05-09 RX ADMIN — PRAVASTATIN SODIUM 10 MG: 10 TABLET ORAL at 09:05

## 2022-05-09 NOTE — PT/OT/SLP PROGRESS
Occupational Therapy   Co-Treatment  Co-treatment with PT for maximal pt participation, safety, and activity tolerance     Name: Diego Gunter  MRN: [id number]  Admitting Diagnosis:  Epidural hematoma  11 Days Post-Op    Recommendations:     Discharge Recommendations: rehabilitation facility  Discharge Equipment Recommendations:  other (see comments) (tbd)  Barriers to discharge:       Assessment:     Diego Gunter is a 81 y.o. male with a medical diagnosis of Epidural hematoma.  He presents with impaired ADL and mobility performance deficits. Pt found upright in bed with pt's spouse present and supportive of session. Pt with current delirium displayed this session impeding pt's participate today with pt requiring frequent cues and redirection to tasks. Pt completed brief grooming session, participated in postural exercises at bedside, and demonstrated functional mobility training in room. Pt required max A x2 for bed mobility, sat EOB ~12 minutes with SBA-CGA, stood with max A, and completed steps at bedside with max A x2 using RW. Pt showed poor motor planning, decreased ability to initiate gait training, had notable bouts of BLE buckling with cues for correction needed, and overall proprioceptive deficits. Pt currently is a high fall risk and is not safe to return home. Pt would benefit from continued OT skilled services 4x/wk to improve daily living skills to optimize QOL.  Pt is recommended to discharge to rehab at this time.   Performance deficits affecting function are weakness, impaired self care skills, impaired balance, impaired functional mobilty, impaired endurance, gait instability, decreased lower extremity function, decreased upper extremity function, decreased coordination, decreased safety awareness.     Rehab Prognosis:  Fair; patient would benefit from acute skilled OT services to address these deficits and reach maximum level of function.       Plan:     Patient to be seen 4 x/week to  address the above listed problems via self-care/home management, therapeutic activities, therapeutic exercises, neuromuscular re-education  · Plan of Care Expires: 05/29/22  · Plan of Care Reviewed with: patient, spouse    Subjective     Pain/Comfort:  · Pain Rating 1: 0/10  · Pain Rating Post-Intervention 1: 0/10    Objective:     Communicated with: RN prior to session.  Patient found HOB elevated with telemetry, amaya catheter upon OT entry to room.    General Precautions: Standard, fall, aspiration   Orthopedic Precautions:spinal precautions   Braces:    Respiratory Status: Room air     Occupational Performance:     Bed Mobility:    · Patient completed Rolling/Turning to Left with  maximal assistance and 2 persons  · Patient completed Scooting/Bridging with maximal assistance and 2 persons  · Patient completed Supine to Sit with maximal assistance and 2 persons  · Patient completed Sit to Supine with maximal assistance and 2 persons     Functional Mobility/Transfers:  · Patient completed Sit <> Stand Transfer with maximal assistance  with  rolling walker   · Functional Mobility: Pt stood from bed with max A using RW and required max A to maintain stand 2/2 posterior lean and BLE buckling. Once pt achieved upright pt required max A x2 to take ~4 steps forward/backward at bedside using RW. Pt needed facilitation and max cues for RW management and step length. Pt completed this trial x2 today with seated break required.   · Prior to standing, pt tolerated EOB 12 minutes with posterior lean noted.    Activities of Daily Living:  · Grooming: total assistance washing face (pt unable to manipulate cloth despite initiation and facilitation from this therapist)  · Upper Body Dressing: total assistance donning gown around back   · Lower Body Dressing: total assistance donning  socks       AMPA 6 Click ADL: 17    Treatment & Education:  Pt educated on role of occupational therapy, POC, and safety during ADLs and  functional mobility. Pt and OT discussed importance of safe, continued mobility to optimize daily living skills. Pt verbalized understanding however unclear carryov.er   White board updated during session. Pt given instruction to call for medical staff/nurse for assistance.       Patient left HOB elevated with all lines intact, call button in reach, bed alarm on, RN notified and pt's spouse  presentEducation:      GOALS:   Multidisciplinary Problems     Occupational Therapy Goals        Problem: Occupational Therapy    Goal Priority Disciplines Outcome Interventions   Occupational Therapy Goal     OT, PT/OT Ongoing, Progressing    Description: Goals to be met by: 5/28/2022     Patient will increase functional independence with ADLs by performing:    Feeding with Set-up Assistance.  UE Dressing with Minimal Assistance.  LE Dressing with Moderate Assistance.  Grooming while seated with Set-up Assistance.  Toileting from bedside commode with Minimal Assistance for hygiene and clothing management.   Sitting at edge of bed x15 minutes with Supervision.  Toilet transfer to bedside commode with Contact Guard Assistance.                     Time Tracking:     OT Date of Treatment: 05/09/22  OT Start Time: 1056  OT Stop Time: 1129  OT Total Time (min): 33 min    Billable Minutes:Self Care/Home Management 15 min  Therapeutic Activity 18 min    OT/AUNG: OT          5/9/2022

## 2022-05-09 NOTE — CLINICAL REVIEW
Hematology Note    Mr. Gunter was diagnosed with type IIb antiphospholipid antibody syndrome after he had a pulmonary embolism in the setting of COVID-19 infection. He had two labs done 5 months apart which confirmed anticardiolipin antibody presence. DRVVT was only positive once. He is now admitted with cervical epidural hematoma resulting in neurological deficits. He had his a/c reversed on admission to the hospital.     CBC with steroid induced neutrophilia. coags wnl.   DRVVT negative. IgM +.     Hematology plan  APS 11b  - on sqh per nsx  - would continue to hold a/c on d/c   - has f/u with primary hematologist Dr. Pantoja on 5/27  - further hematology plan per that visit    Hematology will sign off. Please do not hesitate to contact with any questions or concerns.     Lucian Rodríguez MD  Hematology/Medical Oncology Fellow

## 2022-05-09 NOTE — PLAN OF CARE
Problem: Adult Inpatient Plan of Care  Goal: Plan of Care Review  Outcome: Ongoing, Progressing  Goal: Patient-Specific Goal (Individualized)  Description: Admit Date: 2022    Epidural hematoma    Past Medical History:  2021: Antiphospholipid syndrome  No date: Dysphagia  No date: Hx of colonic polyps      Comment:  followed by GI. Dr. Pham  No date: Hyperlipidemia LDL goal <100  No date: Overweight(278.02)  2011: Prostate cancer      Comment:  followed by urology, Dr. Rogers  No date: Type II or unspecified type diabetes mellitus without   mention of complication, not stated as uncontrolled      Comment:  diet controlled    Past Surgical History:  10/15/2018: BRONCHOSCOPY; N/A      Comment:  Procedure: BRONCHOSCOPY;  [facility]                Provider;  Location: [facility] OR [address] [place] [address];  Service:                Anesthesiology;  Laterality: N/A;  2014: CATARACT EXTRACTION, BILATERAL  2022: POSTERIOR CERVICAL LAMINECTOMY; Bilateral      Comment:  Procedure: LAMINECTOMY, SPINE, CERVICAL, POSTERIOR                APPROACH C3-6;  Surgeon: Carlos Miller MD;  Location:                [address];  Service: Neurosurgery;  Laterality:                Bilateral;  No date: PROSTATECTOMY    Individualization:   1. Dim lights at night  2. Pt likes blanket off  3. Pt does not like wedge under feet    Restraints:   Restraint Order  Length of Order: Order good for next 24 hours or when removed.  Date that the current order will : 22  Time that the current order will :   Order Upon Application: Yes         Outcome: Ongoing, Progressing  Goal: Absence of Hospital-Acquired Illness or Injury  Outcome: Ongoing, Progressing  Goal: Optimal Comfort and Wellbeing  Outcome: Ongoing, Progressing  Goal: Readiness for Transition of Care  Outcome: Ongoing, Progressing     Problem: Adjustment to Illness (Stroke, Ischemic/Transient Ischemic Attack)  Goal: Optimal Coping  Outcome:  Ongoing, Progressing     Problem: Bowel Elimination Impaired (Stroke, Ischemic/Transient Ischemic Attack)  Goal: Effective Bowel Elimination  Outcome: Ongoing, Progressing     Problem: Cerebral Tissue Perfusion (Stroke, Ischemic/Transient Ischemic Attack)  Goal: Optimal Cerebral Tissue Perfusion  Outcome: Ongoing, Progressing     Problem: Cognitive Impairment (Stroke, Ischemic/Transient Ischemic Attack)  Goal: Optimal Cognitive Function  Outcome: Ongoing, Progressing     Problem: Communication Impairment (Stroke, Ischemic/Transient Ischemic Attack)  Goal: Improved Communication Skills  Outcome: Ongoing, Progressing     Problem: Functional Ability Impaired (Stroke, Ischemic/Transient Ischemic Attack)  Goal: Optimal Functional Ability  Outcome: Ongoing, Progressing     Problem: Respiratory Compromise (Stroke, Ischemic/Transient Ischemic Attack)  Goal: Effective Oxygenation and Ventilation  Outcome: Ongoing, Progressing     Problem: Sensorimotor Impairment (Stroke, Ischemic/Transient Ischemic Attack)  Goal: Improved Sensorimotor Function  Outcome: Ongoing, Progressing     Problem: Swallowing Impairment (Stroke, Ischemic/Transient Ischemic Attack)  Goal: Optimal Eating and Swallowing without Aspiration  Outcome: Ongoing, Progressing     Problem: Urinary Elimination Impaired (Stroke, Ischemic/Transient Ischemic Attack)  Goal: Effective Urinary Elimination  Outcome: Ongoing, Progressing     Problem: Fall Injury Risk  Goal: Absence of Fall and Fall-Related Injury  Outcome: Ongoing, Progressing     Problem: Infection  Goal: Absence of Infection Signs and Symptoms  Outcome: Ongoing, Progressing     Problem: Diabetes Comorbidity  Goal: Blood Glucose Level Within Targeted Range  Outcome: Ongoing, Progressing     Problem: Adjustment to Illness (Stroke, Hemorrhagic)  Goal: Optimal Coping  Outcome: Ongoing, Progressing     Problem: Bowel Elimination Impaired (Stroke, Hemorrhagic)  Goal: Effective Bowel Elimination  Outcome:  Ongoing, Progressing     Problem: Cerebral Tissue Perfusion (Stroke, Hemorrhagic)  Goal: Optimal Cerebral Tissue Perfusion  Outcome: Ongoing, Progressing     Problem: Cognitive Impairment (Stroke, Hemorrhagic)  Goal: Optimal Cognitive Function  Outcome: Ongoing, Progressing     Problem: Communication Impairment (Stroke, Hemorrhagic)  Goal: Effective Communication Skills  Outcome: Ongoing, Progressing     Problem: Functional Ability Impaired (Stroke, Hemorrhagic)  Goal: Optimal Functional Ability  Outcome: Ongoing, Progressing     Problem: Pain (Stroke, Hemorrhagic)  Goal: Acceptable Pain Control  Outcome: Ongoing, Progressing     Problem: Respiratory Compromise (Stroke, Hemorrhagic)  Goal: Effective Oxygenation and Ventilation  Outcome: Ongoing, Progressing     Problem: Sensorimotor Impairment (Stroke, Hemorrhagic)  Goal: Improved Sensorimotor Function  Outcome: Ongoing, Progressing     Problem: Swallowing Impairment (Stroke, Hemorrhagic)  Goal: Optimal Eating and Swallowing Without Aspiration  Outcome: Ongoing, Progressing     Problem: Urinary Elimination Impaired (Stroke, Hemorrhagic)  Goal: Effective Urinary Elimination  Outcome: Ongoing, Progressing     Problem: Adjustment to Illness (Delirium)  Goal: Optimal Coping  Outcome: Ongoing, Progressing     Problem: Altered Behavior (Delirium)  Goal: Improved Behavioral Control  Outcome: Ongoing, Progressing     Problem: Attention and Thought Clarity Impairment (Delirium)  Goal: Improved Attention and Thought Clarity  Outcome: Ongoing, Progressing     Problem: Sleep Disturbance (Delirium)  Goal: Improved Sleep  Outcome: Ongoing, Progressing     Problem: Restraint, Nonbehavioral (Nonviolent)  Goal: Absence of Harm or Injury  Outcome: Ongoing, Progressing     Problem: Skin Injury Risk Increased  Goal: Skin Health and Integrity  Outcome: Ongoing, Progressing

## 2022-05-09 NOTE — ASSESSMENT & PLAN NOTE
Secondary to dehydration  IVF, LR 125cc/hr and encourage enteral water intake as patient has post obstructive diuresis  Improved. D/c IV fluids today. Encourage orals

## 2022-05-09 NOTE — SUBJECTIVE & OBJECTIVE
Interval History: Waxing/waning confusion. Slept some of night after Seroquel. Up again around 3 am with agitation. Received depakote. Increased LFTs today. D/c depakote. Repeat UA, pending.    Review of Systems   Constitutional:  Positive for fatigue. Negative for chills and fever.   HENT:  Negative for congestion and sore throat.    Respiratory:  Negative for cough and shortness of breath.    Cardiovascular:  Negative for chest pain, palpitations and leg swelling.   Gastrointestinal:  Negative for abdominal distention, abdominal pain, constipation, diarrhea, nausea and vomiting.   Endocrine: Positive for cold intolerance.   Genitourinary:  Negative for dysuria and frequency.   Musculoskeletal:  Negative for back pain and myalgias.   Skin:  Negative for color change and rash.   Neurological:  Negative for light-headedness and headaches.   Psychiatric/Behavioral:  Positive for confusion and decreased concentration. Negative for hallucinations. The patient is nervous/anxious.    Objective:     Vital Signs (Most Recent):  Temp: 97.9 °F (36.6 °C) (05/09/22 1135)  Pulse: 75 (05/09/22 1321)  Resp: 20 (05/09/22 1321)  BP: 135/69 (05/09/22 1135)  SpO2: 99 % (05/09/22 1321)   Vital Signs (24h Range):  Temp:  [97.1 °F (36.2 °C)-98 °F (36.7 °C)] 97.9 °F (36.6 °C)  Pulse:  [63-84] 75  Resp:  [17-20] 20  SpO2:  [94 %-99 %] 99 %  BP: (103-162)/(62-84) 135/69     Weight: 74.8 kg (165 lb)  Body mass index is 24.37 kg/m².    Intake/Output Summary (Last 24 hours) at 5/9/2022 1336  Last data filed at 5/9/2022 0255  Gross per 24 hour   Intake 3500 ml   Output 3300 ml   Net 200 ml        Physical Exam  Constitutional:       General: He is not in acute distress.     Appearance: He is not ill-appearing or toxic-appearing.   HENT:      Head: Normocephalic and atraumatic.      Nose: No congestion or rhinorrhea.      Mouth/Throat:      Mouth: Mucous membranes are dry.      Pharynx: Oropharynx is clear.   Eyes:      General: No scleral  icterus.     Extraocular Movements: Extraocular movements intact.      Conjunctiva/sclera: Conjunctivae normal.   Cardiovascular:      Rate and Rhythm: Normal rate and regular rhythm.      Pulses: Normal pulses.      Heart sounds: Normal heart sounds.   Pulmonary:      Effort: Pulmonary effort is normal.      Breath sounds: Normal breath sounds. No wheezing.   Abdominal:      General: Abdomen is flat. Bowel sounds are normal. There is no distension.      Palpations: Abdomen is soft.      Tenderness: There is no abdominal tenderness. There is no guarding or rebound.   Genitourinary:     Comments: Floyd in place  Musculoskeletal:         General: No swelling.      Right lower leg: No edema.      Left lower leg: No edema.   Skin:     General: Skin is warm.      Coloration: Skin is not jaundiced.   Neurological:      General: No focal deficit present.      Mental Status: He is alert.      Comments: Oriented to person, intermittently to time and place  Follows commands appropriately  Strength 4/5 throughout  Sensation intact throughout   Psychiatric:         Mood and Affect: Mood normal.         Behavior: Behavior normal.       Significant Labs: All pertinent labs within the past 24 hours have been reviewed.    Significant Imaging: I have reviewed all pertinent imaging results/findings within the past 24 hours.

## 2022-05-09 NOTE — PLAN OF CARE
[facility] (Hospital)  Discharge Reassessment    Primary Care Provider: Portia Ferreira MD    Expected Discharge Date: 5/9/2022    Patient denied Rehab by PHN. CM sent referral to [facility] and is being reviewed for acceptance.      Reassessment (most recent)     Discharge Reassessment - 05/09/22 1249        Discharge Reassessment    Assessment Type Discharge Planning Reassessment     Did the patient's condition or plan change since previous assessment? Yes     Discharge Plan discussed with: Spouse/sig other     Name(s) and Number(s) Addis - spouse [phone]     Communicated MALIA with patient/caregiver Yes     Discharge Plan A Skilled Nursing Facility     Discharge Plan B Home with family;Home Health     DME Needed Upon Discharge  none     Discharge Barriers Identified None     Why the patient remains in the hospital Requires continued medical care        Post-Acute Status    Post-Acute Authorization Placement     Post-Acute Placement Status Pending post-acute provider review/more information requested     Discharge Delays None known at this time

## 2022-05-09 NOTE — ASSESSMENT & PLAN NOTE
--continue multimodal pain regimen and avoid sedatives as allowed  --follow infectious workup - NGTD  --continue daily miralax and senna, PRN lactulose  --urinary retention with bilateral hydroureteronephrosis, urology following. Catheter in place.  --restart seroquel as patient with combative behavior overnight   --delirium precautions  -- MRI brain w/wo contrast without acute process. Chronic microvascular changes  -- Folic acid, thiamine and multivitamin  -- D/c depakote. LFT's increased after administration  -- Continue Seroquel QHS. Can increase dose as indicated to promote sleep and improve regulation of circadian rhythm. Avoid naps during day  -- Delirium precautions  -- Steroid taper

## 2022-05-09 NOTE — SUBJECTIVE & OBJECTIVE
Interval History: AFVSS. Agitation and confusion refractory to depakote. PRN Seroquel added. Strength stable. F/u agitation/delerium workup. MRI brain w/wo without evidence of acute infarct.     Medications:  Continuous Infusions:  Scheduled Meds:   acetaminophen  1,000 mg Oral TID    albuterol-ipratropium  3 mL Nebulization Q6H    azithromycin  500 mg Oral Daily    dexAMETHasone  2 mg Oral Q8H    Followed by    [START ON 5/10/2022] dexAMETHasone  2 mg Oral Q12H    Followed by    [START ON 5/13/2022] dexAMETHasone  2 mg Oral Daily    ethambutoL  800 mg Oral Daily    famotidine  20 mg Oral BID    folic acid  1 mg Oral Daily    heparin (porcine)  5,000 Units Subcutaneous Q8H    LIDOcaine  1 patch Transdermal Q24H    melatonin  9 mg Oral Nightly    morphine  2 mg Intravenous Once    multivitamin  1 tablet Oral Daily    polyethylene glycol  17 g Oral Daily    pravastatin  10 mg Oral Daily    QUEtiapine  25 mg Oral QHS    senna  8.6 mg Oral BID    silodosin  4 mg Oral Daily    sodium chloride 3%  4 mL Nebulization BID    thiamine  100 mg Oral Daily     PRN Meds:acetaminophen, dextrose 10%, dextrose 10%, glucagon (human recombinant), insulin aspart U-100, lactulose, ondansetron, oxyCODONE, QUEtiapine, valproic acid (as sodium salt)     Review of Systems  Objective:     Weight: 74.8 kg (165 lb)  Body mass index is 24.37 kg/m².  Vital Signs (Most Recent):  Temp: 98 °F (36.7 °C) (05/09/22 0816)  Pulse: 84 (05/09/22 0816)  Resp: 17 (05/09/22 0816)  BP: 123/84 (05/09/22 0816)  SpO2: 97 % (05/09/22 0816) Vital Signs (24h Range):  Temp:  [97.1 °F (36.2 °C)-98.4 °F (36.9 °C)] 98 °F (36.7 °C)  Pulse:  [63-84] 84  Resp:  [17-20] 17  SpO2:  [94 %-99 %] 97 %  BP: (103-162)/(62-84) 123/84                          Urethral Catheter 05/05/22 0200 Straight-tip;Non-latex (Active)   Site Assessment Clean;Intact 05/08/22 1759   Collection Container Standard drainage bag 05/08/22 1759   Securement Method secured to top of thigh w/ adhesive  "device 05/08/22 1759   Catheter Care Performed yes 05/08/22 1759   Reason for Continuing Urinary Catheterization Urinary retention 05/08/22 1759   CAUTI Prevention Bundle Securement Device in place with 1" slack;Intact seal between catheter & drainage tubing;Drainage bag/urimeter off the floor;Sheeting clip in use;No dependent loops or kinks;Drainage bag/urimeter not overfilled (<2/3 full);Drainage bag/urimeter below bladder 05/08/22 0705   Output (mL) 1000 mL 05/08/22 1800     Neurosurgery Physical Exam  General: well developed, well nourished, no distress.   Head: normocephalic, atraumatic  Neurologic: Alert and oriented. Thought content somewhat appropriate. Some conversation hard to follow, patient with higher level confusion  Mental Status: Awake, Alert, Oriented to self only. Confusion present. Follows commands.   Language: No aphasia  Speech: No dysarthria  Cranial nerves: face symmetric,   Eyes: pupils equal, round, reactive to light with accommodation, EOMI.   Pulmonary: normal respirations, no signs of respiratory distress  Abdomen: soft,slightly distended  Skin: Skin is warm, dry and intact.  Sensory: intact to light touch throughout     Motor Strength:Moves all extremities spontaneously with good tone.  Full strength upper and lower extremities. No abnormal movements seen.      Strength   Deltoids Triceps Biceps Wrist Extension Wrist Flexion Hand    Upper: R 5/5 5/5 5/5 5/5 5/5 5/5     L 5/5 5/5 5/5 5/5 5/5 5/5       Iliopsoas Quadriceps Knee  Flexion Tibialis  anterior Gastro- cnemius EHL   Lower: R 5/5 5/5 5/5 5/5 5/5 5/5     L 5/5 5/5 5/5 5/5 5/5 5/5      Posterior cervical incision: Clean, dry, staples intact.     Pronator drift: absent    Significant Labs:  Recent Labs   Lab 05/08/22  0708 05/08/22  0750 05/08/22 1951 05/09/22  0732 05/09/22  0759   *  --   --  138*  --       < > 142 145 144   K 3.8  --   --  4.2  --      --   --  110  --    CO2 25  --   --  26  --    BUN 11  " --   --  16  --    CREATININE 0.7  --   --  0.8  --    CALCIUM 9.0  --   --  9.2  --    MG 1.8  --   --  2.1  --     < > = values in this interval not displayed.     Recent Labs   Lab 05/08/22  0708 05/09/22  0732   WBC 16.64* 17.40*   HGB 10.8* 11.7*   HCT 33.9* 36.2*    287     Recent Labs   Lab 05/08/22  0708 05/09/22  0732   INR 1.0 1.0   APTT 28.7 25.4     Microbiology Results (last 7 days)       Procedure Component Value Units Date/Time    Blood culture [[id number]] Collected: 05/03/22 1542    Order Status: Completed Specimen: Blood from Peripheral, Left Hand Updated: 05/08/22 1612     Blood Culture, Routine No growth after 5 days.    Narrative:      Collection has been rescheduled by [facility] at 05/03/2022 12:38 Reason:   Patient unavailable going yo ct will yry back  Collection has been rescheduled by [facility] at 05/03/2022 12:41 Reason:   Spe with sabino stanton Patient unavailable going to ct  Collection has been rescheduled by [facility] at 05/03/2022 14:03 Reason:   Patient unavailable in ultrasound sabino valenzuela said come back in an hr   Collection has been rescheduled by [facility] at 05/03/2022 12:38 Reason:   Patient unavailable going yo ct will yry back  Collection has been rescheduled by [facility] at 05/03/2022 12:41 Reason:   Spe with sabino stanton Patient unavailable going to ct  Collection has been rescheduled by [facility] at 05/03/2022 14:03 Reason:   Patient unavailable in ultrasound sabino valenzuela said come back in an hr     Blood culture [[id number]] Collected: 05/03/22 1543    Order Status: Completed Specimen: Blood from Peripheral, Right Hand Updated: 05/08/22 1612     Blood Culture, Routine No growth after 5 days.    Narrative:      Collection has been rescheduled by [facility] at 05/03/2022 12:38 Reason:   Patient unavailable going yo ct will yry back  Collection has been rescheduled by [facility] at 05/03/2022 12:41 Reason:   Spe with sabino stanton Patient unavailable going to ct  Collection has been rescheduled by [facility] at 05/03/2022 14:03 Reason:   Patient  unavailable in ultrasound sabino valenzuela said come back in an hr   Collection has been rescheduled by [facility] at 05/03/2022 12:38 Reason:   Patient unavailable going yo ct will yry back  Collection has been rescheduled by [facility] at 05/03/2022 12:41 Reason:   Spe with rn romy Patient unavailable going to ct  Collection has been rescheduled by [facility] at 05/03/2022 14:03 Reason:   Patient unavailable in ultrasound sabino valenzuela said come back in an hr     Culture, Anaerobe [[id number]]     Order Status: Completed Specimen: Neck     Aerobic culture [[id number]]     Order Status: Completed Specimen: Neck     AFB Culture & Smear [[id number]]     Order Status: No result Specimen: Respiratory from Sputum, Expectorated     Culture, Respiratory with Gram Stain [[id number]]     Order Status: No result Specimen: Respiratory     Blood culture [[id number]] Collected: 04/28/22 1201    Order Status: Completed Specimen: Blood from Peripheral, Forearm, Right Updated: 05/02/22 1303     Blood Culture, Routine No Growth after 4 days.     Blood culture [[id number]] Collected: 04/28/22 1201    Order Status: Completed Specimen: Blood from Peripheral, Hand, Left Updated: 05/02/22 1303     Blood Culture, Routine No Growth after 4 days.           All pertinent labs from the last 24 hours have been reviewed.    Significant Diagnostics:  I have reviewed all pertinent imaging results/findings within the past 24 hours.

## 2022-05-09 NOTE — ASSESSMENT & PLAN NOTE
82 y/o male with APS on coumadin, hx PE, MAC pneumonia, HLD with spontaneous C3-C6 epidural hematoma compressing the spinal cord    S/p  C3-C6 laminectomy and fusion for evacuation of epidural hematoma 4/28    Stepped down to NPU    -- CTH 4/29 no acute intracranial process   -- Post op MRI C spine satisfactory decompression with continued cord signal edema   --postop XR C spine hardware in good position  --Hypotension: BP stable on 5/5 s/p IVF. Appreciate HM assistance.    --Continue Zosyn for empiric coverage of gram neg and anaerobes.   --Reglan discontinued  -- Acute encephalopathy:    --CTH 5/3 ordered per medicine team for some confusion: without evidence of acute hemorrhage.    --Hypernatremia: Na WNL. continue to monitor daily   --BLE US wo DVT, UA neg, CXR negative for acute processes   --Telesitter discontinued.  --Agitation:    -Restraints replaced 5/7 due to combative agitation. Wean restraints and use redirection in place of restraints as able.   -Scheduled 25 mg Seroquel qhs. Monitor QTc. Daily EKG. PRN Seroquel. PRN Depakote.    -Repeat UA   -MRI brain w/wo contrast: without stroke.   -- Bilateral hydroureteronephrosis: Catheter in place. Urology following.   -- C-collar to be worn when OOB  -- HV drains removed 4/30  -- MAP goals completed in ICU  -- History of antiphospholipid syndrome and PE: hold warfarin. Heme/onc signed off.    -Plan for outpatient follow up 5/27  -- SLP recommending regular diet with nectar thick liquids.  -- Dex taper to off.   -- Constipation: Bowel movement s/p enema on 5/3  -- Activity: PT/OT recs for rehab  -- DVT prophylaxis: TEDs/SCDs/SQH  --Concern for hematoma vs abscess. Final path returned as blood clot. Daptomycin discontinued per ID recs.    - Specimen sent for cultures 5/6. CTM.    Dispo: Pending rehab vs SNF

## 2022-05-09 NOTE — PROGRESS NOTES
"[facility] - Neurosurgery ([facility])  [facility] Medicine  Progress Note    Patient Name: Diego Gunter  MRN: [id number]  Patient Class: IP- Inpatient   Admission Date: 4/28/2022  Length of Stay: 11 days  Attending Physician: Carlos Miller MD  Primary Care Provider: Portia Ferreira MD        Subjective:     Principal Problem:Epidural hematoma        HPI:  Mr. Gunter is a 81 year-old male with antiphospholipid syndrome (diagnosed 11/2021, on warfarin), PE, HLD, MAC (diagnosed in 2018, on chronic ethambutol and azithromycin), previous prostate ca (diagnosed in 2011, s/p prostatectomy) who presented to the hospital on 04/28 with R-sided weakness. Stroke workup was negative, but imaging was consistent with a compressive epidural hematoma ni the cervical spine C3-C6. He underwent spinal decompression and fluid evacuation with NSGY on 04/28. Since admitted, the Hematology/Oncology service was consulted- recommended not resuming therapeutic anticoagulation for APLS given current bleeding event and starting ppx anticoagulation when deemed safe by nsgy. ID was also consulted for recommendations regarding whether the epidural fluid collection could be an infectious collection- recommended starting dapto until path returned. Hospital course has been complicated by shock with intermittent pressor requirements (off since 05/01) and waxing/waning encephalopathy. He was noted to have acute changes in mental status around 05/01 which prompted a workup- EEG was obtained without epileptiform changes but with evidence of diffuse background slowing. Medicine consulted for "medical management; APS previously on coumadin; post op delirium".      Overview/Hospital Course:  No notes on file    Interval History: Waxing/waning confusion. Slept some of night after Seroquel. Up again around 3 am with agitation. Received depakote. Increased LFTs today. D/c depakote. Repeat UA, pending.    Review of Systems   Constitutional:  Positive for " fatigue. Negative for chills and fever.   HENT:  Negative for congestion and sore throat.    Respiratory:  Negative for cough and shortness of breath.    Cardiovascular:  Negative for chest pain, palpitations and leg swelling.   Gastrointestinal:  Negative for abdominal distention, abdominal pain, constipation, diarrhea, nausea and vomiting.   Endocrine: Positive for cold intolerance.   Genitourinary:  Negative for dysuria and frequency.   Musculoskeletal:  Negative for back pain and myalgias.   Skin:  Negative for color change and rash.   Neurological:  Negative for light-headedness and headaches.   Psychiatric/Behavioral:  Positive for confusion and decreased concentration. Negative for hallucinations. The patient is nervous/anxious.    Objective:     Vital Signs (Most Recent):  Temp: 97.9 °F (36.6 °C) (05/09/22 1135)  Pulse: 75 (05/09/22 1321)  Resp: 20 (05/09/22 1321)  BP: 135/69 (05/09/22 1135)  SpO2: 99 % (05/09/22 1321)   Vital Signs (24h Range):  Temp:  [97.1 °F (36.2 °C)-98 °F (36.7 °C)] 97.9 °F (36.6 °C)  Pulse:  [63-84] 75  Resp:  [17-20] 20  SpO2:  [94 %-99 %] 99 %  BP: (103-162)/(62-84) 135/69     Weight: 74.8 kg (165 lb)  Body mass index is 24.37 kg/m².    Intake/Output Summary (Last 24 hours) at 5/9/2022 1336  Last data filed at 5/9/2022 0255  Gross per 24 hour   Intake 3500 ml   Output 3300 ml   Net 200 ml        Physical Exam  Constitutional:       General: He is not in acute distress.     Appearance: He is not ill-appearing or toxic-appearing.   HENT:      Head: Normocephalic and atraumatic.      Nose: No congestion or rhinorrhea.      Mouth/Throat:      Mouth: Mucous membranes are dry.      Pharynx: Oropharynx is clear.   Eyes:      General: No scleral icterus.     Extraocular Movements: Extraocular movements intact.      Conjunctiva/sclera: Conjunctivae normal.   Cardiovascular:      Rate and Rhythm: Normal rate and regular rhythm.      Pulses: Normal pulses.      Heart sounds: Normal heart  sounds.   Pulmonary:      Effort: Pulmonary effort is normal.      Breath sounds: Normal breath sounds. No wheezing.   Abdominal:      General: Abdomen is flat. Bowel sounds are normal. There is no distension.      Palpations: Abdomen is soft.      Tenderness: There is no abdominal tenderness. There is no guarding or rebound.   Genitourinary:     Comments: Floyd in place  Musculoskeletal:         General: No swelling.      Right lower leg: No edema.      Left lower leg: No edema.   Skin:     General: Skin is warm.      Coloration: Skin is not jaundiced.   Neurological:      General: No focal deficit present.      Mental Status: He is alert.      Comments: Oriented to person, intermittently to time and place  Follows commands appropriately  Strength 4/5 throughout  Sensation intact throughout   Psychiatric:         Mood and Affect: Mood normal.         Behavior: Behavior normal.       Significant Labs: All pertinent labs within the past 24 hours have been reviewed.    Significant Imaging: I have reviewed all pertinent imaging results/findings within the past 24 hours.      Assessment/Plan:      * Epidural hematoma  --continue management per primary      Hypernatremia  Secondary to dehydration  IVF, LR 125cc/hr and encourage enteral water intake as patient has post obstructive diuresis  Improved. D/c IV fluids today. Encourage orals      Impaired mobility  PT/OT following      Acute encephalopathy  --continue multimodal pain regimen and avoid sedatives as allowed  --follow infectious workup - NGTD  --continue daily miralax and senna, PRN lactulose  --urinary retention with bilateral hydroureteronephrosis, urology following. Catheter in place.  --restart seroquel as patient with combative behavior overnight   --delirium precautions  -- MRI brain w/wo contrast without acute process. Chronic microvascular changes  -- Folic acid, thiamine and multivitamin  -- D/c depakote. LFT's increased after administration  -- Continue  Seroquel QHS. Can increase dose as indicated to promote sleep and improve regulation of circadian rhythm. Avoid naps during day  -- Delirium precautions  -- Steroid taper  -- F/u repeat UA today, pending    Urinary retention  Floyd in place      Intermittent confusion  See acute encephalopathy      Acute thoracic back pain  -patient seen by vascular Surgical input appreciated.  No recommendation for intervention  -CTA chest/abdomen/pelvis showed no dissection, no PE, questionable changes C3-C6 epidural process.    -MRI brain and C-spine ordered       Acute ischemic left MCA stroke  Neurology consulted      Antiphospholipid syndrome        Hypercoagulable state        History of pulmonary embolism  --hold therapeutic anticoagulation at this time, per Heme/Onc recs, given recent bleed    Cognitive communication deficit  SLP consulted      Mild cognitive impairment        HILLARY (mycobacterium avium-intracellulare)  --continue ethambutol and azithromycin      Cavitary lung disease        Hyperlipidemia associated with type 2 diabetes mellitus  --hold statin given LFTs. Defer to resuming outpatient for long term benefit      Prediabetes        History of prostate cancer          VTE Risk Mitigation (From admission, onward)         Ordered     heparin (porcine) injection 5,000 Units  Every 8 hours         04/29/22 1704     Reason for No Pharmacological VTE Prophylaxis  Once        Question:  Reasons:  Answer:  Risk of Bleeding    04/28/22 2058     IP VTE HIGH RISK PATIENT  Once         04/28/22 2058     Place sequential compression device  Until discontinued         04/28/22 2058                  Katarzyna Ji DO  Department of Hospital Medicine   [facility] ([facility])

## 2022-05-09 NOTE — PT/OT/SLP PROGRESS
Physical Therapy Treatment  cotx with OT d/t assistance need for safe, skilled intervention    Patient Name:  Diego Gunter   MRN:  [id number]    Recommendations:     Discharge Recommendations:  rehabilitation facility   Discharge Equipment Recommendations:  (TBD)   Barriers to discharge: impaired functional mobility requiring increased assistance    Assessment:     Diego Gunter is a 81 y.o. male admitted with a medical diagnosis of Epidural hematoma.  He presents with the following impairments/functional limitations:  weakness, impaired endurance, impaired self care skills, impaired functional mobilty, gait instability, impaired balance, decreased lower extremity function, decreased upper extremity function, decreased coordination, decreased safety awareness, impaired cognition, impaired coordination, impaired fine motor, decreased ROM. Pt tolerates session fairly with focus on bed mobility, transfers, and gait training. Per MDs prior to therapists entry pt with acute delirium. Pt presents extremely lethargic with somnolent periods throughout session. Pt oriented to self and knows he is in a hospital on interview. Pt requires maximal cueing to wakeful state, attention to task, and participation throughout. Pt requiring assistance of 2 persons to stand and ambulate over short distance at EOB. Pt regressing over last several sessions as writing therapist familiar with pt from previous therapy visit and pt was able to ambulate over increased distance with assistance of single therapist. Pt returned to supine and repositioned for safety and skin protection once too fatigued to continue meaningfully participation. Pt will continue to benefit from therapy services to address impairments listed above. Will plan for PT to re-assess pt on next visit to assess pt and his PT goals.     Rehab Prognosis: Good; patient would benefit from acute skilled PT services to address these deficits and reach maximum level of  function.    Recent Surgery: Procedure(s) (LRB):  LAMINECTOMY, SPINE, CERVICAL, POSTERIOR APPROACH C3-6 (Bilateral) 11 Days Post-Op    Plan:     During this hospitalization, patient to be seen 4 x/week to address the identified rehab impairments via gait training, therapeutic activities, therapeutic exercises, neuromuscular re-education and progress toward the following goals:    · Plan of Care Expires:  05/30/22    Subjective     Chief Complaint: no c/o  Pain/Comfort:  · Pain Rating 1: 0/10  · Pain Rating Post-Intervention 1: 0/10      Objective:     Communicated with RN prior to session.  Patient found HOB elevated with telemetry, amaya catheter upon PTA entry to room. Spouse at bedside.     General Precautions: Standard, aspiration, fall   Orthopedic Precautions:spinal precautions   Braces: Cervical collar  Respiratory Status: Room air      Functional Mobility:  · Bed Mobility:     · Rolling Left:  maximal assistance and of 2 persons  · Scooting: maximal assistance and of 2 persons  · Supine to Sit: maximal assistance and of 2 persons  · Sit to Supine: maximal assistance and of 2 persons  · Transfers:     · Sit to Stand:  maximal assistance with rolling walker  · Gait: Pt takes ~4 steps fwd/bwd from EOB with Max A of 2 persons. Pt with worsening of already significant posterior lean. BLE instability noted.       AM-PAC 6 CLICK MOBILITY  Turning over in bed (including adjusting bedclothes, sheets and blankets)?: 2  Sitting down on and standing up from a chair with arms (e.g., wheelchair, bedside commode, etc.): 2  Moving from lying on back to sitting on the side of the bed?: 2  Moving to and from a bed to a chair (including a wheelchair)?: 2  Need to walk in hospital room?: 2  Climbing 3-5 steps with a railing?: 1  Basic Mobility Total Score: 11       Therapeutic Activities and Exercises:  Pt assisted with functional mobility as noted above.   Pt sits at EOB for ~12 minutes with Max to Total A d/t posterior lean.    Inconsistent command following with somnolent state throughout.       Patient left HOB elevated with all lines intact, call button in reach, bed alarm on, RN notified and spouse present.    GOALS:   Multidisciplinary Problems     Physical Therapy Goals        Problem: Physical Therapy    Goal Priority Disciplines Outcome Goal Variances Interventions   Physical Therapy Goal     PT, PT/OT Ongoing, Progressing     Description: Goals to be met by: 2022     Patient will increase functional independence with mobility by performin. Supine to sit with minimum assistance  2. Sit to supine with minimum assistance - met 2022  Updated: supervision while maintaining spinal precautions   3. Sit to stand transfer with minimum assistance - met 2022  Updated: supervision   4. Bed to chair transfer with minimum assistance using LRAD as needed  5. Gait  x 25 feet with minimum assistance using LRAD as needed  6. Lower extremity exercise program x10 reps per handout, with independence  7. Recall 3/3 spinal precautions and how to don/doff c-collar with supervision                    Time Tracking:     PT Received On: 22  PT Start Time: 1056     PT Stop Time: 1128  PT Total Time (min): 32 min     Billable Minutes: Gait Training 15 and Therapeutic Activity 15    Treatment Type: Treatment  PT/PTA: PTA     PTA Visit Number: 3     2022

## 2022-05-10 LAB
ALBUMIN SERPL BCP-MCNC: 3.5 G/DL (ref 3.5–5.2)
ALP SERPL-CCNC: 98 U/L (ref 55–135)
ALT SERPL W/O P-5'-P-CCNC: 90 U/L (ref 10–44)
ANION GAP SERPL CALC-SCNC: 8 MMOL/L (ref 8–16)
APTT BLDCRRT: 26.6 SEC (ref 21–32)
AST SERPL-CCNC: 48 U/L (ref 10–40)
BASOPHILS # BLD AUTO: 0.07 K/UL (ref 0–0.2)
BASOPHILS NFR BLD: 0.4 % (ref 0–1.9)
BILIRUB SERPL-MCNC: 0.5 MG/DL (ref 0.1–1)
BUN SERPL-MCNC: 15 MG/DL (ref 8–23)
CALCIUM SERPL-MCNC: 9.1 MG/DL (ref 8.7–10.5)
CHLORIDE SERPL-SCNC: 113 MMOL/L (ref 95–110)
CO2 SERPL-SCNC: 23 MMOL/L (ref 23–29)
CREAT SERPL-MCNC: 0.7 MG/DL (ref 0.5–1.4)
DIFFERENTIAL METHOD: ABNORMAL
EOSINOPHIL # BLD AUTO: 0 K/UL (ref 0–0.5)
EOSINOPHIL NFR BLD: 0.1 % (ref 0–8)
ERYTHROCYTE [DISTWIDTH] IN BLOOD BY AUTOMATED COUNT: 17.4 % (ref 11.5–14.5)
EST. GFR  (AFRICAN AMERICAN): >60 ML/MIN/1.73 M^2
EST. GFR  (NON AFRICAN AMERICAN): >60 ML/MIN/1.73 M^2
GLUCOSE SERPL-MCNC: 139 MG/DL (ref 70–110)
HCT VFR BLD AUTO: 36.6 % (ref 40–54)
HGB BLD-MCNC: 11.8 G/DL (ref 14–18)
IMM GRANULOCYTES # BLD AUTO: 0.54 K/UL (ref 0–0.04)
IMM GRANULOCYTES NFR BLD AUTO: 2.9 % (ref 0–0.5)
INR PPP: 1 (ref 0.8–1.2)
LYMPHOCYTES # BLD AUTO: 2.3 K/UL (ref 1–4.8)
LYMPHOCYTES NFR BLD: 12.3 % (ref 18–48)
MAGNESIUM SERPL-MCNC: 2.1 MG/DL (ref 1.6–2.6)
MCH RBC QN AUTO: 28.1 PG (ref 27–31)
MCHC RBC AUTO-ENTMCNC: 32.2 G/DL (ref 32–36)
MCV RBC AUTO: 87 FL (ref 82–98)
MONOCYTES # BLD AUTO: 1.2 K/UL (ref 0.3–1)
MONOCYTES NFR BLD: 6.5 % (ref 4–15)
NEUTROPHILS # BLD AUTO: 14.4 K/UL (ref 1.8–7.7)
NEUTROPHILS NFR BLD: 77.8 % (ref 38–73)
NRBC BLD-RTO: 0 /100 WBC
PHOSPHATE SERPL-MCNC: 3.3 MG/DL (ref 2.7–4.5)
PLATELET # BLD AUTO: 275 K/UL (ref 150–450)
PMV BLD AUTO: 11.8 FL (ref 9.2–12.9)
POCT GLUCOSE: 146 MG/DL (ref 70–110)
POCT GLUCOSE: 147 MG/DL (ref 70–110)
POTASSIUM SERPL-SCNC: 4 MMOL/L (ref 3.5–5.1)
PROT SERPL-MCNC: 6.5 G/DL (ref 6–8.4)
PROTHROMBIN TIME: 10.3 SEC (ref 9–12.5)
RBC # BLD AUTO: 4.2 M/UL (ref 4.6–6.2)
SODIUM SERPL-SCNC: 141 MMOL/L (ref 136–145)
SODIUM SERPL-SCNC: 144 MMOL/L (ref 136–145)
SODIUM SERPL-SCNC: 144 MMOL/L (ref 136–145)
VIT B1 BLD-MCNC: 114 UG/L (ref 38–122)
WBC # BLD AUTO: 18.56 K/UL (ref 3.9–12.7)

## 2022-05-10 PROCEDURE — 94640 AIRWAY INHALATION TREATMENT: CPT

## 2022-05-10 PROCEDURE — 93010 ELECTROCARDIOGRAM REPORT: CPT | Mod: ,,, | Performed by: INTERNAL MEDICINE

## 2022-05-10 PROCEDURE — 92526 ORAL FUNCTION THERAPY: CPT

## 2022-05-10 PROCEDURE — 97535 SELF CARE MNGMENT TRAINING: CPT

## 2022-05-10 PROCEDURE — 36415 COLL VENOUS BLD VENIPUNCTURE: CPT

## 2022-05-10 PROCEDURE — 25000003 PHARM REV CODE 250

## 2022-05-10 PROCEDURE — 25000242 PHARM REV CODE 250 ALT 637 W/ HCPCS: Performed by: STUDENT IN AN ORGANIZED HEALTH CARE EDUCATION/TRAINING PROGRAM

## 2022-05-10 PROCEDURE — 94761 N-INVAS EAR/PLS OXIMETRY MLT: CPT

## 2022-05-10 PROCEDURE — 99024 PR POST-OP FOLLOW-UP VISIT: ICD-10-PCS | Mod: ,,, | Performed by: PHYSICIAN ASSISTANT

## 2022-05-10 PROCEDURE — 25000003 PHARM REV CODE 250: Performed by: STUDENT IN AN ORGANIZED HEALTH CARE EDUCATION/TRAINING PROGRAM

## 2022-05-10 PROCEDURE — 85730 THROMBOPLASTIN TIME PARTIAL: CPT | Performed by: PHYSICIAN ASSISTANT

## 2022-05-10 PROCEDURE — 97112 NEUROMUSCULAR REEDUCATION: CPT

## 2022-05-10 PROCEDURE — 99900035 HC TECH TIME PER 15 MIN (STAT)

## 2022-05-10 PROCEDURE — 80053 COMPREHEN METABOLIC PANEL: CPT | Performed by: STUDENT IN AN ORGANIZED HEALTH CARE EDUCATION/TRAINING PROGRAM

## 2022-05-10 PROCEDURE — 63600175 PHARM REV CODE 636 W HCPCS: Performed by: STUDENT IN AN ORGANIZED HEALTH CARE EDUCATION/TRAINING PROGRAM

## 2022-05-10 PROCEDURE — 85025 COMPLETE CBC W/AUTO DIFF WBC: CPT | Performed by: STUDENT IN AN ORGANIZED HEALTH CARE EDUCATION/TRAINING PROGRAM

## 2022-05-10 PROCEDURE — 84295 ASSAY OF SERUM SODIUM: CPT

## 2022-05-10 PROCEDURE — 97116 GAIT TRAINING THERAPY: CPT

## 2022-05-10 PROCEDURE — 99233 SBSQ HOSP IP/OBS HIGH 50: CPT | Mod: GC,,, | Performed by: HOSPITALIST

## 2022-05-10 PROCEDURE — 83735 ASSAY OF MAGNESIUM: CPT | Performed by: STUDENT IN AN ORGANIZED HEALTH CARE EDUCATION/TRAINING PROGRAM

## 2022-05-10 PROCEDURE — 93005 ELECTROCARDIOGRAM TRACING: CPT

## 2022-05-10 PROCEDURE — 99024 POSTOP FOLLOW-UP VISIT: CPT | Mod: ,,, | Performed by: PHYSICIAN ASSISTANT

## 2022-05-10 PROCEDURE — 85610 PROTHROMBIN TIME: CPT | Performed by: PHYSICIAN ASSISTANT

## 2022-05-10 PROCEDURE — 99233 PR SUBSEQUENT HOSPITAL CARE,LEVL III: ICD-10-PCS | Mod: GC,,, | Performed by: HOSPITALIST

## 2022-05-10 PROCEDURE — 25000003 PHARM REV CODE 250: Performed by: HOSPITALIST

## 2022-05-10 PROCEDURE — 93010 EKG 12-LEAD: ICD-10-PCS | Mod: ,,, | Performed by: INTERNAL MEDICINE

## 2022-05-10 PROCEDURE — 84100 ASSAY OF PHOSPHORUS: CPT | Performed by: STUDENT IN AN ORGANIZED HEALTH CARE EDUCATION/TRAINING PROGRAM

## 2022-05-10 PROCEDURE — 63700000 PHARM REV CODE 250 ALT 637 W/O HCPCS: Performed by: STUDENT IN AN ORGANIZED HEALTH CARE EDUCATION/TRAINING PROGRAM

## 2022-05-10 PROCEDURE — 25000003 PHARM REV CODE 250: Performed by: PHYSICIAN ASSISTANT

## 2022-05-10 PROCEDURE — 94668 MNPJ CHEST WALL SBSQ: CPT

## 2022-05-10 PROCEDURE — 63600175 PHARM REV CODE 636 W HCPCS: Performed by: PHYSICIAN ASSISTANT

## 2022-05-10 PROCEDURE — 11000001 HC ACUTE MED/SURG PRIVATE ROOM

## 2022-05-10 RX ADMIN — HEPARIN SODIUM 5000 UNITS: 5000 INJECTION INTRAVENOUS; SUBCUTANEOUS at 02:05

## 2022-05-10 RX ADMIN — IPRATROPIUM BROMIDE AND ALBUTEROL SULFATE 3 ML: 2.5; .5 SOLUTION RESPIRATORY (INHALATION) at 12:05

## 2022-05-10 RX ADMIN — FAMOTIDINE 20 MG: 20 TABLET ORAL at 09:05

## 2022-05-10 RX ADMIN — ACETAMINOPHEN 1000 MG: 500 TABLET ORAL at 04:05

## 2022-05-10 RX ADMIN — DEXAMETHASONE 2 MG: 1 TABLET ORAL at 02:05

## 2022-05-10 RX ADMIN — HEPARIN SODIUM 5000 UNITS: 5000 INJECTION INTRAVENOUS; SUBCUTANEOUS at 12:05

## 2022-05-10 RX ADMIN — AZITHROMYCIN MONOHYDRATE 500 MG: 250 TABLET ORAL at 09:05

## 2022-05-10 RX ADMIN — SENNOSIDES 8.6 MG: 8.6 TABLET ORAL at 09:05

## 2022-05-10 RX ADMIN — ONDANSETRON 4 MG: 2 INJECTION INTRAMUSCULAR; INTRAVENOUS at 09:05

## 2022-05-10 RX ADMIN — ACETAMINOPHEN 1000 MG: 500 TABLET ORAL at 12:05

## 2022-05-10 RX ADMIN — DEXAMETHASONE 2 MG: 1 TABLET ORAL at 09:05

## 2022-05-10 RX ADMIN — ACETAMINOPHEN 1000 MG: 500 TABLET ORAL at 09:05

## 2022-05-10 RX ADMIN — QUETIAPINE FUMARATE 50 MG: 25 TABLET ORAL at 07:05

## 2022-05-10 RX ADMIN — HEPARIN SODIUM 5000 UNITS: 5000 INJECTION INTRAVENOUS; SUBCUTANEOUS at 09:05

## 2022-05-10 RX ADMIN — FOLIC ACID 1 MG: 1 TABLET ORAL at 09:05

## 2022-05-10 RX ADMIN — LIDOCAINE 1 PATCH: 50 PATCH CUTANEOUS at 01:05

## 2022-05-10 RX ADMIN — Medication 9 MG: at 09:05

## 2022-05-10 RX ADMIN — SILODOSIN 4 MG: 4 CAPSULE ORAL at 09:05

## 2022-05-10 RX ADMIN — DEXAMETHASONE 2 MG: 1 TABLET ORAL at 12:05

## 2022-05-10 RX ADMIN — ETHAMBUTOL HYDROCHLORIDE 800 MG: 400 TABLET, FILM COATED ORAL at 09:05

## 2022-05-10 RX ADMIN — THERA TABS 1 TABLET: TAB at 09:05

## 2022-05-10 RX ADMIN — IPRATROPIUM BROMIDE AND ALBUTEROL SULFATE 3 ML: 2.5; .5 SOLUTION RESPIRATORY (INHALATION) at 08:05

## 2022-05-10 RX ADMIN — THIAMINE HCL TAB 100 MG 100 MG: 100 TAB at 09:05

## 2022-05-10 NOTE — ASSESSMENT & PLAN NOTE
--continue multimodal pain regimen and avoid sedatives as allowed  --follow infectious workup - NGTD  --continue daily miralax and senna, PRN lactulose  --urinary retention with bilateral hydroureteronephrosis, urology following. Catheter in place.  --restart seroquel as patient with combative behavior overnight   --delirium precautions  -- MRI brain w/wo contrast without acute process. Chronic microvascular changes  -- Folic acid, thiamine and multivitamin  -- D/c depakote. LFT's increased after administration, now improving  -- Continue Seroquel 50 mg QHS. Sleeping better on this regimen and encephalopathy improving. Avoid naps during day  -- Delirium precautions  -- Steroid taper

## 2022-05-10 NOTE — PLAN OF CARE
Problem: SLP  Goal: SLP Goal  Description: Speech Language Pathology Goals  Additional goals to be met by 5/16:   1. Pt will tolerate mechanical soft diet with thin liquids without overt s/sx airway threat.  2. Pt will Ox4 IND.  3. Pt will answer responsive naming questions with 90% acc given min cues.  4. Pt will complete short term recall tasks with min cues.   5. Pt will complete generative naming tasks with min cues.   6. Pt will complete basic reasoning tasks with min cues.  Outcome: Ongoing, Progressing  SLP rec: continue mechanical soft diet with thin liquids following strict aspiration precautions and double swallows. Continue ST per POC.  5/10/2022

## 2022-05-10 NOTE — PLAN OF CARE
Problem: Adult Inpatient Plan of Care  Goal: Plan of Care Review  Outcome: Ongoing, Progressing     Problem: Fall Injury Risk  Goal: Absence of Fall and Fall-Related Injury  Outcome: Ongoing, Progressing     Problem: Infection  Goal: Absence of Infection Signs and Symptoms  Outcome: Ongoing, Progressing     Problem: Diabetes Comorbidity  Goal: Blood Glucose Level Within Targeted Range  Outcome: Ongoing, Progressing     Problem: Adjustment to Illness (Stroke, Hemorrhagic)  Goal: Optimal Coping  Outcome: Ongoing, Progressing

## 2022-05-10 NOTE — SUBJECTIVE & OBJECTIVE
Interval History: NAEON. Patient awake and alert this AM, family reports good sleep overnight and note improved cognition. Motor exam stable. Incision with staples intact. Denied for IPR, pending SNF    Medications:  Continuous Infusions:  Scheduled Meds:   acetaminophen  1,000 mg Oral TID    albuterol-ipratropium  3 mL Nebulization Q6H    azithromycin  500 mg Oral Daily    dexAMETHasone  2 mg Oral Q8H    Followed by    dexAMETHasone  2 mg Oral Q12H    Followed by    [START ON 5/13/2022] dexAMETHasone  2 mg Oral Daily    ethambutoL  800 mg Oral Daily    famotidine  20 mg Oral BID    folic acid  1 mg Oral Daily    heparin (porcine)  5,000 Units Subcutaneous Q8H    LIDOcaine  1 patch Transdermal Q24H    melatonin  9 mg Oral Nightly    morphine  2 mg Intravenous Once    multivitamin  1 tablet Oral Daily    polyethylene glycol  17 g Oral Daily    QUEtiapine  50 mg Oral QHS    senna  8.6 mg Oral BID    silodosin  4 mg Oral Daily    sodium chloride 3%  4 mL Nebulization BID    thiamine  100 mg Oral Daily     PRN Meds:acetaminophen, dextrose 10%, dextrose 10%, glucagon (human recombinant), insulin aspart U-100, lactulose, ondansetron, oxyCODONE, QUEtiapine     Review of Systems  Objective:     Weight: 74.8 kg (165 lb)  Body mass index is 24.37 kg/m².  Vital Signs (Most Recent):  Temp: 98.6 °F (37 °C) (05/10/22 1139)  Pulse: 78 (05/10/22 1236)  Resp: 18 (05/10/22 1236)  BP: (!) 110/56 (05/10/22 1139)  SpO2: 96 % (05/10/22 1236)   Vital Signs (24h Range):  Temp:  [97.6 °F (36.4 °C)-98.6 °F (37 °C)] 98.6 °F (37 °C)  Pulse:  [65-84] 78  Resp:  [17-20] 18  SpO2:  [95 %-99 %] 96 %  BP: (107-140)/(56-68) 110/56     Date 05/10/22 0700 - 05/11/22 0659   Shift 0058-5399 3884-5552 8819-3110 24 Hour Total   INTAKE   Shift Total(mL/kg)       OUTPUT   Urine(mL/kg/hr) 2150   2150   Shift Total(mL/kg) 2150(28.7)   2150(28.7)   Weight (kg) 74.8 74.8 74.8 74.8                        Urethral Catheter 05/05/22 0200 Straight-tip;Non-latex  "(Active)   Site Assessment Clean;Intact 05/10/22 1150   Collection Container Standard drainage bag 05/10/22 1150   Securement Method secured to top of thigh w/ adhesive device 05/10/22 1150   Catheter Care Performed yes 05/10/22 1150   Reason for Continuing Urinary Catheterization Placed by Urology Service 05/10/22 0800   CAUTI Prevention Bundle Securement Device in place with 1" slack;Intact seal between catheter & drainage tubing;Drainage bag/urimeter off the floor;Sheeting clip in use;No dependent loops or kinks;Drainage bag/urimeter not overfilled (<2/3 full) 05/10/22 0800   Output (mL) 2150 mL 05/10/22 1150       Physical Exam    Neurosurgery Physical Exam  General: well developed, well nourished, no distress.   Head: normocephalic, atraumatic  Neurologic: Alert and oriented. Thought content somewhat appropriate. Some conversation hard to follow, patient with higher level confusion  Mental Status: Awake, Alert, Oriented to self and year. Confusion present. Follows commands.   Language: No aphasia  Speech: No dysarthria  Cranial nerves: face symmetric,   Eyes: pupils equal, round, reactive to light with accommodation, EOMI.   Pulmonary: normal respirations, no signs of respiratory distress  Abdomen: soft,slightly distended  Skin: Skin is warm, dry and intact.  Sensory: intact to light touch throughout     Motor Strength:Moves all extremities spontaneously with good tone.  Full strength upper and lower extremities. No abnormal movements seen.      Strength   Deltoids Triceps Biceps Wrist Extension Wrist Flexion Hand    Upper: R 5/5 5/5 5/5 5/5 5/5 5/5     L 5/5 5/5 5/5 5/5 5/5 5/5       Iliopsoas Quadriceps Knee  Flexion Tibialis  anterior Gastro- cnemius EHL   Lower: R 5/5 5/5 5/5 5/5 5/5 5/5     L 5/5 5/5 5/5 5/5 5/5 5/5      Posterior cervical incision: Clean, dry, staples intact.      Significant Labs:  Recent Labs   Lab 05/09/22  0732 05/09/22  0759 05/09/22  2036 05/10/22  0827   *  --   --  139* "    144 140 144  144   K 4.2  --   --  4.0     --   --  113*   CO2 26  --   --  23   BUN 16  --   --  15   CREATININE 0.8  --   --  0.7   CALCIUM 9.2  --   --  9.1   MG 2.1  --   --  2.1     Recent Labs   Lab 05/09/22  0732 05/10/22  0827   WBC 17.40* 18.56*   HGB 11.7* 11.8*   HCT 36.2* 36.6*    275     Recent Labs   Lab 05/09/22  0732 05/10/22  0827   INR 1.0 1.0   APTT 25.4 26.6     Microbiology Results (last 7 days)       Procedure Component Value Units Date/Time    Blood culture [[id number]] Collected: 05/03/22 1542    Order Status: Completed Specimen: Blood from Peripheral, Left Hand Updated: 05/08/22 1612     Blood Culture, Routine No growth after 5 days.    Narrative:      Collection has been rescheduled by [facility] at 05/03/2022 12:38 Reason:   Patient unavailable going yo ct will yry back  Collection has been rescheduled by [facility] at 05/03/2022 12:41 Reason:   Spe with sabino stanton Patient unavailable going to ct  Collection has been rescheduled by [facility] at 05/03/2022 14:03 Reason:   Patient unavailable in ultrasound sabino valenzuela said come back in an hr   Collection has been rescheduled by [facility] at 05/03/2022 12:38 Reason:   Patient unavailable going yo ct will yry back  Collection has been rescheduled by [facility] at 05/03/2022 12:41 Reason:   Spe with sabino stanton Patient unavailable going to ct  Collection has been rescheduled by [facility] at 05/03/2022 14:03 Reason:   Patient unavailable in ultrasound sabino valenzuela said come back in an hr     Blood culture [[id number]] Collected: 05/03/22 1543    Order Status: Completed Specimen: Blood from Peripheral, Right Hand Updated: 05/08/22 1612     Blood Culture, Routine No growth after 5 days.    Narrative:      Collection has been rescheduled by [facility] at 05/03/2022 12:38 Reason:   Patient unavailable going yo ct will yry back  Collection has been rescheduled by [facility] at 05/03/2022 12:41 Reason:   Spe with sabino stanton Patient unavailable going to ct  Collection has been rescheduled by [facility]  at 05/03/2022 14:03 Reason:   Patient unavailable in ultrasound sabino valenzuela said come back in an hr   Collection has been rescheduled by [facility] at 05/03/2022 12:38 Reason:   Patient unavailable going yo ct will yry back  Collection has been rescheduled by [facility] at 05/03/2022 12:41 Reason:   Spe with sabino stanton Patient unavailable going to ct  Collection has been rescheduled by [facility] at 05/03/2022 14:03 Reason:   Patient unavailable in ultrasound sabino valenzuela said come back in an hr     Culture, Anaerobe [[id number]]     Order Status: Completed Specimen: Neck     Aerobic culture [[id number]]     Order Status: Completed Specimen: Neck     AFB Culture & Smear [[id number]]     Order Status: No result Specimen: Respiratory from Sputum, Expectorated     Culture, Respiratory with Gram Stain [[id number]]     Order Status: No result Specimen: Respiratory           All pertinent labs from the last 24 hours have been reviewed.    Significant Diagnostics:  No results found in the last 24 hours.

## 2022-05-10 NOTE — PROGRESS NOTES
Torres Travis - Neurosurgery ([facility])  Neurosurgery  Progress Note    Subjective:     History of Present Illness: Patient is a 82 y/o male with antiphospholipid syndrome on chronic coumadin, MAC pneumonia, HLD, and left PE diagnosed January 2021 who was transferred from  for compressive epidural hematoma in the cervical spine. Patient reports that he woke up in the middle of the night and could no move the right side of his body. He went to  ED and stroke code initiated. Patient was not given tPA but given ASA. CTA was negative for acute CVA but did show compressive fluid collective from C3-C6. MRI cervical spine confirmed. Patient was transferred to [facility] ED for neurosurgical evaluation. He reports he cannot move his right arm out of the contracted position, he also cannot lift his right leg off the bed. He reports the right arm and leg are painful. No complaints on left side of body. Denies b/b dysfunction.       Post-Op Info:  Procedure(s) (LRB):  LAMINECTOMY, SPINE, CERVICAL, POSTERIOR APPROACH C3-6 (Bilateral)   12 Days Post-Op     Interval History: NAEON. Patient awake and alert this AM, family reports good sleep overnight and note improved cognition. Motor exam stable. Incision with staples intact. Denied for IPR, pending SNF    Medications:  Continuous Infusions:  Scheduled Meds:   acetaminophen  1,000 mg Oral TID    albuterol-ipratropium  3 mL Nebulization Q6H    azithromycin  500 mg Oral Daily    dexAMETHasone  2 mg Oral Q8H    Followed by    dexAMETHasone  2 mg Oral Q12H    Followed by    [START ON 5/13/2022] dexAMETHasone  2 mg Oral Daily    ethambutoL  800 mg Oral Daily    famotidine  20 mg Oral BID    folic acid  1 mg Oral Daily    heparin (porcine)  5,000 Units Subcutaneous Q8H    LIDOcaine  1 patch Transdermal Q24H    melatonin  9 mg Oral Nightly    morphine  2 mg Intravenous Once    multivitamin  1 tablet Oral Daily    polyethylene glycol  17 g Oral Daily    QUEtiapine  50 mg Oral QHS  "   senna  8.6 mg Oral BID    silodosin  4 mg Oral Daily    sodium chloride 3%  4 mL Nebulization BID    thiamine  100 mg Oral Daily     PRN Meds:acetaminophen, dextrose 10%, dextrose 10%, glucagon (human recombinant), insulin aspart U-100, lactulose, ondansetron, oxyCODONE, QUEtiapine     Review of Systems  Objective:     Weight: 74.8 kg (165 lb)  Body mass index is 24.37 kg/m².  Vital Signs (Most Recent):  Temp: 98.6 °F (37 °C) (05/10/22 1139)  Pulse: 78 (05/10/22 1236)  Resp: 18 (05/10/22 1236)  BP: (!) 110/56 (05/10/22 1139)  SpO2: 96 % (05/10/22 1236)   Vital Signs (24h Range):  Temp:  [97.6 °F (36.4 °C)-98.6 °F (37 °C)] 98.6 °F (37 °C)  Pulse:  [65-84] 78  Resp:  [17-20] 18  SpO2:  [95 %-99 %] 96 %  BP: (107-140)/(56-68) 110/56     Date 05/10/22 0700 - 05/11/22 0659   Shift 4583-8298 9104-4449 5458-4179 24 Hour Total   INTAKE   Shift Total(mL/kg)       OUTPUT   Urine(mL/kg/hr) 2150   2150   Shift Total(mL/kg) 2150(28.7)   2150(28.7)   Weight (kg) 74.8 74.8 74.8 74.8                        Urethral Catheter 05/05/22 0200 Straight-tip;Non-latex (Active)   Site Assessment Clean;Intact 05/10/22 1150   Collection Container Standard drainage bag 05/10/22 1150   Securement Method secured to top of thigh w/ adhesive device 05/10/22 1150   Catheter Care Performed yes 05/10/22 1150   Reason for Continuing Urinary Catheterization Placed by Urology Service 05/10/22 0800   CAUTI Prevention Bundle Securement Device in place with 1" slack;Intact seal between catheter & drainage tubing;Drainage bag/urimeter off the floor;Sheeting clip in use;No dependent loops or kinks;Drainage bag/urimeter not overfilled (<2/3 full) 05/10/22 0800   Output (mL) 2150 mL 05/10/22 1150       Physical Exam    Neurosurgery Physical Exam  General: well developed, well nourished, no distress.   Head: normocephalic, atraumatic  Neurologic: Alert and oriented. Thought content somewhat appropriate. Some conversation hard to follow, patient with " higher level confusion  Mental Status: Awake, Alert, Oriented to self and year. Confusion present. Follows commands.   Language: No aphasia  Speech: No dysarthria  Cranial nerves: face symmetric,   Eyes: pupils equal, round, reactive to light with accommodation, EOMI.   Pulmonary: normal respirations, no signs of respiratory distress  Abdomen: soft,slightly distended  Skin: Skin is warm, dry and intact.  Sensory: intact to light touch throughout     Motor Strength:Moves all extremities spontaneously with good tone.  Full strength upper and lower extremities. No abnormal movements seen.      Strength   Deltoids Triceps Biceps Wrist Extension Wrist Flexion Hand    Upper: R 5/5 5/5 5/5 5/5 5/5 5/5     L 5/5 5/5 5/5 5/5 5/5 5/5       Iliopsoas Quadriceps Knee  Flexion Tibialis  anterior Gastro- cnemius EHL   Lower: R 5/5 5/5 5/5 5/5 5/5 5/5     L 5/5 5/5 5/5 5/5 5/5 5/5      Posterior cervical incision: Clean, dry, staples intact.      Significant Labs:  Recent Labs   Lab 05/09/22  0732 05/09/22  0759 05/09/22  2036 05/10/22  0827   *  --   --  139*    144 140 144  144   K 4.2  --   --  4.0     --   --  113*   CO2 26  --   --  23   BUN 16  --   --  15   CREATININE 0.8  --   --  0.7   CALCIUM 9.2  --   --  9.1   MG 2.1  --   --  2.1     Recent Labs   Lab 05/09/22  0732 05/10/22  0827   WBC 17.40* 18.56*   HGB 11.7* 11.8*   HCT 36.2* 36.6*    275     Recent Labs   Lab 05/09/22  0732 05/10/22  0827   INR 1.0 1.0   APTT 25.4 26.6     Microbiology Results (last 7 days)       Procedure Component Value Units Date/Time    Blood culture [[id number]] Collected: 05/03/22 1542    Order Status: Completed Specimen: Blood from Peripheral, Left Hand Updated: 05/08/22 1612     Blood Culture, Routine No growth after 5 days.    Narrative:      Collection has been rescheduled by TH6 at 05/03/2022 12:38 Reason:   Patient unavailable going yo ct will yry back  Collection has been rescheduled by CYRIL at 05/03/2022  12:41 Reason:   Spe with rn romy Patient unavailable going to ct  Collection has been rescheduled by TH6 at 05/03/2022 14:03 Reason:   Patient unavailable in ultrasound rn nicolas said come back in an hr   Collection has been rescheduled by TH6 at 05/03/2022 12:38 Reason:   Patient unavailable going yo ct will yry back  Collection has been rescheduled by TH6 at 05/03/2022 12:41 Reason:   Spe with sabino stanton Patient unavailable going to ct  Collection has been rescheduled by TH6 at 05/03/2022 14:03 Reason:   Patient unavailable in ultrasound rn nicolas said come back in an hr     Blood culture [[id number]] Collected: 05/03/22 1543    Order Status: Completed Specimen: Blood from Peripheral, Right Hand Updated: 05/08/22 1612     Blood Culture, Routine No growth after 5 days.    Narrative:      Collection has been rescheduled by 6 at 05/03/2022 12:38 Reason:   Patient unavailable going yo ct will yry back  Collection has been rescheduled by 6 at 05/03/2022 12:41 Reason:   Spe with rn romy Patient unavailable going to ct  Collection has been rescheduled by 6 at 05/03/2022 14:03 Reason:   Patient unavailable in ultrasound sabino valenzuela said come back in an hr   Collection has been rescheduled by 6 at 05/03/2022 12:38 Reason:   Patient unavailable going yo ct will yry back  Collection has been rescheduled by 6 at 05/03/2022 12:41 Reason:   Spe with sabino stanton Patient unavailable going to ct  Collection has been rescheduled by 6 at 05/03/2022 14:03 Reason:   Patient unavailable in ultrasound sabino valenzuela said come back in an hr     Culture, Anaerobe [[id number]]     Order Status: Completed Specimen: Neck     Aerobic culture [[id number]]     Order Status: Completed Specimen: Neck     AFB Culture & Smear [[id number]]     Order Status: No result Specimen: Respiratory from Sputum, Expectorated     Culture, Respiratory with Gram Stain [[id number]]     Order Status: No result Specimen: Respiratory           All pertinent labs from the  last 24 hours have been reviewed.    Significant Diagnostics:  No results found in the last 24 hours.      Assessment/Plan:     * Epidural hematoma  80 y/o male with APS on coumadin, hx PE, MAC pneumonia, HLD with spontaneous C3-C6 epidural hematoma compressing the spinal cord    S/p  C3-C6 laminectomy and fusion for evacuation of epidural hematoma 4/28     Stepped down to NPU    -- CTH 4/29 no acute intracranial process   -- Post op MRI C spine satisfactory decompression with continued cord signal edema   --postop XR C spine hardware in good position  -- Acute encephalopathy:    --CTH 5/3 ordered per medicine team for some confusion: without evidence of acute hemorrhage.    --Hypernatremia: Na WNL. continue to monitor daily   --BLE US wo DVT, UA neg, CXR negative for acute processes   --Telesitter discontinued.  --Agitation:    -Restraints replaced 5/7 due to combative agitation. Wean restraints and use redirection in place of restraints as able.   -Scheduled 25 mg Seroquel qhs. Monitor QTc. Daily EKG. PRN Seroquel. Discontinue depakote for elevated LFTs   -Repeat UA negative    -MRI brain w/wo contrast: no acute infarct or hemorrhage.   -- Bilateral hydroureteronephrosis: Catheter in place. Keep in place until Urology follow up, 2 week follow up with Urology scheduled   -- C-collar to be worn when OOB  -- HV drains removed 4/30  -- History of antiphospholipid syndrome and PE: hold warfarin. Heme/onc signed off.    -Plan for outpatient follow up 5/27  -- SLP recommending regular diet with nectar thick liquids.  -- Dex taper to off. PPI while on steroids  -- Constipation: Bowel movement s/p enema on 5/3  -- Activity: PT/OT recs for rehab  -- DVT prophylaxis: TEDs/SCDs/SQH  --Concern for hematoma vs abscess. Final path returned as blood clot. Daptomycin discontinued per ID recs.    - Specimen sent for cultures 5/6. CTM.    Dispo: Denied IPR, pending [facility]        Elysia Dexter PA-C  Neurosurgery  [facility] -  Neurosurgery ([facility])

## 2022-05-10 NOTE — PT/OT/SLP PROGRESS
"Physical Therapy Treatment    Patient Name:  Diego Gunter   MRN:  [id number]    Recommendations:     Discharge Recommendations:  nursing facility, skilled   Discharge Equipment Recommendations:  (TBD)   Barriers to discharge: Increased level of assist    Assessment:     Diego Gunter is a 81 y.o. male admitted with a medical diagnosis of Epidural hematoma. Patient demonstrates increase in B knee buckling and posterior lean affecting ability to participate in gait trial safely this date. Patient demonstrates increase in tremor during 3rd standing trial with decreased responsiveness to questions, returned to HOB elevated with improvement noted in ability to answer questions.    He presents with the following impairments/functional limitations: weakness, impaired endurance, impaired self care skills, impaired functional mobilty, gait instability, impaired balance, decreased lower extremity function, decreased upper extremity function, decreased coordination, decreased safety awareness, impaired cognition, impaired coordination, impaired fine motor, decreased ROM. Diego Gunter would continue to benefit from acute PT intervention to improve quality of life and focus on recovery of impairments. Once medically stable, recommending pt discharge to nursing facility, skilled.    Rehab Prognosis: Good; patient continues to benefit from acute skilled PT services to address these deficits and reach maximum level of function.  Recent Surgery: Procedure(s) (LRB):  LAMINECTOMY, SPINE, CERVICAL, POSTERIOR APPROACH C3-6 (Bilateral) 12 Days Post-Op    Plan:     During this hospitalization, patient to be seen 4 x/week to address the identified rehab impairments via gait training, therapeutic activities, therapeutic exercises, neuromuscular re-education and progress toward the following goals:    · Plan of Care Expires:  05/30/22    Subjective     Chief Complaint: R wrist pain  Patient/Family Comments/Goals: "I'm sorry, I " "just got so weak", "I think we're trying to get him to the skilled nursing down the street"  Pain/Comfort:  · Pain Rating 1: 10/10  · Location - Side 1: Right  · Location - Orientation 1: generalized  · Location 1: wrist  · Pain Addressed 1: Distraction, Cessation of Activity  · Pain Rating Post-Intervention 1:  (not rated)    Objective:     Communicated with RN prior to session. Patient found HOB elevated with telemetry, amaya catheter, SCD upon PT entry to room.     General Precautions: Standard, aspiration, fall   Orthopedic Precautions:spinal precautions   Braces: Cervical collar    Functional Mobility:  · Bed Mobility:     · Rolling Left:  maximal assistance  · Scooting: minimum assistance  · Supine to Sit: moderate assistance for log rolling technique  · Sit to Supine: maximal assistance for log rolling technique  · Transfers:     · Sit to Stand:  moderate assistance of 2 persons progressing to minimum assistance of 2 persons with hand-held assist, 3 trials  · Gait: Patient ambulated 2 steps forward/back, 4 steps to the left with hand-held assist and maximal assistance of 2 persons. Patient demonstrates unsteady gait, decreased step length, decreased weight shift, decreased foot clearance, decreased suraj, posterior lean, decreased bilateral knee stability, impaired sequencing, and ataxic gait. Cuing for sequencing, forward lean, and B knee stability. All lines remained intact throughout ambulation trial, gait belt utilized.  · Balance:   · Static Sitting: Fair, able to maintain for 5 minute(s) with minimum assistance progressing to contact guard assistance   · Dynamic Sitting: Fair: Patient accepts minimal challenge, minimum assistance  · Static Standing: Poor, able to maintain for 15 seconds with maximal assistance of 2 persons, demonstrates significant posterior lean minimally corrected with visual, verbal, and tactile cuing, 3 trials  · Dynamic Standing: Poor: Patient unable to accept challenge or move " without loss of balance, maximal assistance of 2 persons    AM-PAC 6 CLICK MOBILITY  Turning over in bed (including adjusting bedclothes, sheets and blankets)?: 2  Sitting down on and standing up from a chair with arms (e.g., wheelchair, bedside commode, etc.): 2  Moving from lying on back to sitting on the side of the bed?: 2  Moving to and from a bed to a chair (including a wheelchair)?: 2  Need to walk in hospital room?: 2  Climbing 3-5 steps with a railing?: 1  Basic Mobility Total Score: 11     Therapeutic Activities and Exercises:  Patient educated on role of acute care PT and PT POC  Patient is not clear to transfer with RN/PCT   Educated patient's family about change in discharge recommendations, enforcing delirium precautions, and safe progression of therapy    Patient left HOB elevated with all lines intact, call button in reach and spouse and family present.    GOALS:   Multidisciplinary Problems     Physical Therapy Goals        Problem: Physical Therapy    Goal Priority Disciplines Outcome Goal Variances Interventions   Physical Therapy Goal     PT, PT/OT Ongoing, Progressing     Description: Goals to be met by: 2022     Patient will increase functional independence with mobility by performin. Supine to sit with minimum assistance  2. Sit to supine with minimum assistance - met 2022  Updated: supervision while maintaining spinal precautions   3. Sit to stand transfer with minimum assistance - met 2022  Updated: supervision   4. Bed to chair transfer with minimum assistance using LRAD as needed  5. Gait  x 25 feet with minimum assistance using LRAD as needed  6. Lower extremity exercise program x10 reps per handout, with independence  7. Recall 3/3 spinal precautions and how to don/doff c-collar with supervision                    Time Tracking:     PT Received On: 05/10/22  PT Start Time: 1344     PT Stop Time: 1413  PT Total Time (min): 29 min     Billable Minutes: Gait Training 10  min and Neuromuscular Re-education 15 min     Treatment Type: Treatment  PT/PTA: PT     PTA Visit Number: 0     05/10/2022    Therapy tech utilized for session to maximize physical performance and safety

## 2022-05-10 NOTE — ASSESSMENT & PLAN NOTE
82 y/o male with APS on coumadin, hx PE, MAC pneumonia, HLD with spontaneous C3-C6 epidural hematoma compressing the spinal cord    S/p  C3-C6 laminectomy and fusion for evacuation of epidural hematoma 4/28     Stepped down to NPU    -- CTH 4/29 no acute intracranial process   -- Post op MRI C spine satisfactory decompression with continued cord signal edema   --postop XR C spine hardware in good position  -- Acute encephalopathy:    --CTH 5/3 ordered per medicine team for some confusion: without evidence of acute hemorrhage.    --Hypernatremia: Na WNL. continue to monitor daily   --BLE US wo DVT, UA neg, CXR negative for acute processes   --Telesitter discontinued.  --Agitation:    -Restraints replaced 5/7 due to combative agitation. Wean restraints and use redirection in place of restraints as able.   -Scheduled 25 mg Seroquel qhs. Monitor QTc. Daily EKG. PRN Seroquel. Discontinue depakote for elevated LFTs   -Repeat UA negative    -MRI brain w/wo contrast: no acute infarct or hemorrhage.   -- Bilateral hydroureteronephrosis: Catheter in place. Keep in place until Urology follow up, 2 week follow up with Urology scheduled   -- C-collar to be worn when OOB  -- HV drains removed 4/30  -- History of antiphospholipid syndrome and PE: hold warfarin. Heme/onc signed off.    -Plan for outpatient follow up 5/27  -- SLP recommending regular diet with nectar thick liquids.  -- Dex taper to off. PPI while on steroids  -- Constipation: Bowel movement s/p enema on 5/3  -- Activity: PT/OT recs for rehab  -- DVT prophylaxis: TEDs/SCDs/SQH  --Concern for hematoma vs abscess. Final path returned as blood clot. Daptomycin discontinued per ID recs.    - Specimen sent for cultures 5/6. CTM.    Dispo: Denied IPR, pending SNF

## 2022-05-10 NOTE — PT/OT/SLP PROGRESS
Speech Language Pathology Treatment    Patient Name:  Diego Gunter   MRN:  [id number]  Admitting Diagnosis: Epidural hematoma    Recommendations:                 General Recommendations:  Dysphagia therapy, Speech/language therapy and Cognitive-linguistic therapy  Diet recommendations:  Mechanical soft, Liquid Diet Level: Thin   Aspiration Precautions: 1 bite/sip at a time, Assistance with meals, Eliminate distractions, Feed only when awake/alert, Frequent oral care, HOB to 90 degrees, Monitor for s/s of aspiration, Remain upright 30 minutes post meal, Small bites/sips and Standard aspiration precautions   General Precautions: Standard, fall, aspiration  Communication strategies:  provide increased time to answer and go to room if call light pushed    Subjective     SLP communicated with RN prior to entry and received clearance for therapy this date.   Pt awake and alert upon ST entry. Spouse and grandson at bedside. Pt agreeable to participate with ST.    Pain/Comfort:  Pain Rating 1: 0/10  Pain Rating Post-Intervention 1: 0/10    Respiratory Status: Room air    Objective:     Has the patient been evaluated by SLP for swallowing?   Yes  Keep patient NPO? No      Pt seen bedside for ongoing ST. Pt and family endorsed good tolerance of current recs. Pt accepted x5 straw sips water and 1/2 sheet james cracker without overt s/s aspiration or airway compromise. He required cues to implement double swallow during po trials. He took additional large straw sip thin liquid with subsequent coughing. SLP offered ongoing education re: importance of safe swallow precautions. SLP introduced effortful swallow and sierra maneuver exercises to which pt completed x3 of each demonstrating moderate difficulty. ST educated pt and family re: role of SLP, risk factors for aspiration, current po rec, safe swallow precautions, freq/reps of dysphagia exercises, and POC moving forward to which family verbalized understanding. Handout of  dysphagia exercises provided. Continue ST per POC.    Assessment:     Diego Gunter is a 81 y.o. male with an SLP diagnosis of Aphasia and Cognitive-Linguistic Impairment.      Goals:   Multidisciplinary Problems     SLP Goals        Problem: SLP    Goal Priority Disciplines Outcome   SLP Goal    Low SLP Ongoing, Progressing   Description: Speech Language Pathology Goals  Goals expected to be met by 5/6:  1. Patient will tolerate a puree diet and nectar thickened liquids with no overt signs of airway compromise. MET  2. Patient will answer simple yes/no questions with 50% acc indep. MET  3. Patient will complete auto speech tasks with 60% acc given min A. MET  4. Patient will follow simple commands with 60% acc given min A. ONGOING  5. Patient will participate in further assessments as appropriate.     Additional goals to be met by 5/16:   1. Pt will tolerate mechanical soft diet with thin liquids without overt s/sx airway threat.  2. Pt will Ox4 IND.  3. Pt will answer responsive naming questions with 90% acc given min cues.  4. Pt will complete short term recall tasks with min cues.   5. Pt will complete generative naming tasks with min cues.   6. Pt will complete basic reasoning tasks with min cues.                             Plan:     · Patient to be seen:  4 x/week   · Plan of Care reviewed with:  patient, spouse, grandchild(amy)   · SLP Follow-Up:  Yes       Discharge recommendations:  rehabilitation facility   Barriers to Discharge:  Level of Skilled Assistance Needed .    Time Tracking:     SLP Treatment Date:   05/10/22  Speech Start Time:  0925  Speech Stop Time:  0946     Speech Total Time (min):  21 min    Billable Minutes: Treatment Swallowing Dysfunction 11 and Self Care/Home Management Training 10  05/10/2022

## 2022-05-10 NOTE — PLAN OF CARE
Goals reviewed and remain appropriate. Discharge recommendation changed to SNF due to current delirium affecting ability to participate in therapy safely    Problem: Physical Therapy  Goal: Physical Therapy Goal  Description: Goals to be met by: 2022     Patient will increase functional independence with mobility by performin. Supine to sit with minimum assistance  2. Sit to supine with minimum assistance - met 2022  Updated: supervision while maintaining spinal precautions   3. Sit to stand transfer with minimum assistance - met 2022  Updated: supervision   4. Bed to chair transfer with minimum assistance using LRAD as needed  5. Gait  x 25 feet with minimum assistance using LRAD as needed  6. Lower extremity exercise program x10 reps per handout, with independence  7. Recall 3/3 spinal precautions and how to don/doff c-collar with supervision   Outcome: Ongoing, Progressing

## 2022-05-10 NOTE — SUBJECTIVE & OBJECTIVE
Interval History: Encephalopathy improved today. Slept all night. Continue Seroquel 50 mg QHS. LFT's slightly improved. Working with PT.    Review of Systems   Constitutional:  Positive for fatigue. Negative for chills and fever.   HENT:  Negative for congestion and sore throat.    Respiratory:  Negative for cough and shortness of breath.    Cardiovascular:  Negative for chest pain, palpitations and leg swelling.   Gastrointestinal:  Negative for abdominal distention, abdominal pain, constipation, diarrhea, nausea and vomiting.   Endocrine: Positive for cold intolerance.   Genitourinary:  Negative for dysuria and frequency.   Musculoskeletal:  Negative for back pain and myalgias.   Skin:  Negative for color change and rash.   Neurological:  Negative for light-headedness and headaches.   Psychiatric/Behavioral:  Positive for confusion and decreased concentration. Negative for hallucinations. The patient is nervous/anxious.    Objective:     Vital Signs (Most Recent):  Temp: 98.6 °F (37 °C) (05/10/22 1139)  Pulse: 80 (05/10/22 1139)  Resp: 17 (05/10/22 1139)  BP: (!) 110/56 (05/10/22 1139)  SpO2: 95 % (05/10/22 1139)   Vital Signs (24h Range):  Temp:  [97.6 °F (36.4 °C)-98.6 °F (37 °C)] 98.6 °F (37 °C)  Pulse:  [65-84] 80  Resp:  [17-20] 17  SpO2:  [95 %-99 %] 95 %  BP: (107-140)/(56-68) 110/56     Weight: 74.8 kg (165 lb)  Body mass index is 24.37 kg/m².    Intake/Output Summary (Last 24 hours) at 5/10/2022 1232  Last data filed at 5/10/2022 1150  Gross per 24 hour   Intake 1200 ml   Output 6950 ml   Net -5750 ml        Physical Exam  Constitutional:       General: He is not in acute distress.     Appearance: He is not ill-appearing or toxic-appearing.   HENT:      Head: Normocephalic and atraumatic.      Nose: No congestion or rhinorrhea.      Mouth/Throat:      Mouth: Mucous membranes are dry.      Pharynx: Oropharynx is clear.   Eyes:      General: No scleral icterus.     Extraocular Movements: Extraocular  movements intact.      Conjunctiva/sclera: Conjunctivae normal.   Cardiovascular:      Rate and Rhythm: Normal rate and regular rhythm.      Pulses: Normal pulses.      Heart sounds: Normal heart sounds.   Pulmonary:      Effort: Pulmonary effort is normal.      Breath sounds: Normal breath sounds. No wheezing.   Abdominal:      General: Abdomen is flat. Bowel sounds are normal. There is no distension.      Palpations: Abdomen is soft.      Tenderness: There is no abdominal tenderness. There is no guarding or rebound.   Genitourinary:     Comments: Floyd in place  Musculoskeletal:         General: No swelling.      Right lower leg: No edema.      Left lower leg: No edema.   Skin:     General: Skin is warm.      Coloration: Skin is not jaundiced.   Neurological:      General: No focal deficit present.      Mental Status: He is alert.      Comments: Oriented to person, intermittently to time and place  Follows commands appropriately  Strength 4/5 throughout  Sensation intact throughout   Psychiatric:         Mood and Affect: Mood normal.         Behavior: Behavior normal.       Significant Labs: All pertinent labs within the past 24 hours have been reviewed.    Significant Imaging: I have reviewed all pertinent imaging results/findings within the past 24 hours.

## 2022-05-10 NOTE — PLAN OF CARE
CHADD advised cuong that [facility] declined accepting patient.  Chadd had provided a list earlier in the day and referrals sent.  [facility] and [facility] accepted.  Cuong stated he will not go to [facility].  Cuong requested referral be sent to [facility]. CHADD sent referral to preferred fax at [facility].

## 2022-05-10 NOTE — PROGRESS NOTES
"[facility] Neuro[facility] ([facility])  [facility] Medicine  Progress Note    Patient Name: Diego Gunter  MRN: [id number]  Patient Class: IP- Inpatient   Admission Date: 4/28/2022  Length of Stay: 12 days  Attending Physician: Carlos Miller MD  Primary Care Provider: Portia Ferreira MD        Subjective:     Principal Problem:Epidural hematoma        HPI:  Mr. Gunter is a 81 year-old male with antiphospholipid syndrome (diagnosed 11/2021, on warfarin), PE, HLD, MAC (diagnosed in 2018, on chronic ethambutol and azithromycin), previous prostate ca (diagnosed in 2011, s/p prostatectomy) who presented to the hospital on 04/28 with R-sided weakness. Stroke workup was negative, but imaging was consistent with a compressive epidural hematoma ni the cervical spine C3-C6. He underwent spinal decompression and fluid evacuation with NSGY on 04/28. Since admitted, the Hematology/Oncology service was consulted- recommended not resuming therapeutic anticoagulation for APLS given current bleeding event and starting ppx anticoagulation when deemed safe by nsgy. ID was also consulted for recommendations regarding whether the epidural fluid collection could be an infectious collection- recommended starting dapto until path returned. Hospital course has been complicated by shock with intermittent pressor requirements (off since 05/01) and waxing/waning encephalopathy. He was noted to have acute changes in mental status around 05/01 which prompted a workup- EEG was obtained without epileptiform changes but with evidence of diffuse background slowing. Medicine consulted for "medical management; APS previously on coumadin; post op delirium".      Overview/Hospital Course:  No notes on file    Interval History: Encephalopathy improved today. Slept all night. Continue Seroquel 50 mg QHS. LFT's slightly improved. Working with PT.    Review of Systems   Constitutional:  Positive for fatigue. Negative for chills and fever.   HENT:  Negative " for congestion and sore throat.    Respiratory:  Negative for cough and shortness of breath.    Cardiovascular:  Negative for chest pain, palpitations and leg swelling.   Gastrointestinal:  Negative for abdominal distention, abdominal pain, constipation, diarrhea, nausea and vomiting.   Endocrine: Positive for cold intolerance.   Genitourinary:  Negative for dysuria and frequency.   Musculoskeletal:  Negative for back pain and myalgias.   Skin:  Negative for color change and rash.   Neurological:  Negative for light-headedness and headaches.   Psychiatric/Behavioral:  Positive for confusion and decreased concentration. Negative for hallucinations. The patient is nervous/anxious.    Objective:     Vital Signs (Most Recent):  Temp: 98.6 °F (37 °C) (05/10/22 1139)  Pulse: 80 (05/10/22 1139)  Resp: 17 (05/10/22 1139)  BP: (!) 110/56 (05/10/22 1139)  SpO2: 95 % (05/10/22 1139)   Vital Signs (24h Range):  Temp:  [97.6 °F (36.4 °C)-98.6 °F (37 °C)] 98.6 °F (37 °C)  Pulse:  [65-84] 80  Resp:  [17-20] 17  SpO2:  [95 %-99 %] 95 %  BP: (107-140)/(56-68) 110/56     Weight: 74.8 kg (165 lb)  Body mass index is 24.37 kg/m².    Intake/Output Summary (Last 24 hours) at 5/10/2022 1232  Last data filed at 5/10/2022 1150  Gross per 24 hour   Intake 1200 ml   Output 6950 ml   Net -5750 ml        Physical Exam  Constitutional:       General: He is not in acute distress.     Appearance: He is not ill-appearing or toxic-appearing.   HENT:      Head: Normocephalic and atraumatic.      Nose: No congestion or rhinorrhea.      Mouth/Throat:      Mouth: Mucous membranes are dry.      Pharynx: Oropharynx is clear.   Eyes:      General: No scleral icterus.     Extraocular Movements: Extraocular movements intact.      Conjunctiva/sclera: Conjunctivae normal.   Cardiovascular:      Rate and Rhythm: Normal rate and regular rhythm.      Pulses: Normal pulses.      Heart sounds: Normal heart sounds.   Pulmonary:      Effort: Pulmonary effort is  normal.      Breath sounds: Normal breath sounds. No wheezing.   Abdominal:      General: Abdomen is flat. Bowel sounds are normal. There is no distension.      Palpations: Abdomen is soft.      Tenderness: There is no abdominal tenderness. There is no guarding or rebound.   Genitourinary:     Comments: Floyd in place  Musculoskeletal:         General: No swelling.      Right lower leg: No edema.      Left lower leg: No edema.   Skin:     General: Skin is warm.      Coloration: Skin is not jaundiced.   Neurological:      General: No focal deficit present.      Mental Status: He is alert.      Comments: Oriented to person, intermittently to time and place  Follows commands appropriately  Strength 4/5 throughout  Sensation intact throughout   Psychiatric:         Mood and Affect: Mood normal.         Behavior: Behavior normal.       Significant Labs: All pertinent labs within the past 24 hours have been reviewed.    Significant Imaging: I have reviewed all pertinent imaging results/findings within the past 24 hours.      Assessment/Plan:      * Epidural hematoma  --continue management per primary      Hypernatremia  Secondary to dehydration  Improved. Encourage orals      Impaired mobility  PT/OT following      Acute encephalopathy  --continue multimodal pain regimen and avoid sedatives as allowed  --follow infectious workup - NGTD  --continue daily miralax and senna, PRN lactulose  --urinary retention with bilateral hydroureteronephrosis, urology following. Catheter in place.  --restart seroquel as patient with combative behavior overnight   --delirium precautions  -- MRI brain w/wo contrast without acute process. Chronic microvascular changes  -- Folic acid, thiamine and multivitamin  -- D/c depakote. LFT's increased after administration, now improving  -- Continue Seroquel 50 mg QHS. Sleeping better on this regimen and encephalopathy improving. Avoid naps during day  -- Delirium precautions  -- Steroid  taper    Urinary retention  Floyd in place      Intermittent confusion  See acute encephalopathy      Acute thoracic back pain  -patient seen by vascular Surgical input appreciated.  No recommendation for intervention  -CTA chest/abdomen/pelvis showed no dissection, no PE, questionable changes C3-C6 epidural process.    -MRI brain and C-spine ordered       Acute ischemic left MCA stroke  Neurology consulted      History of pulmonary embolism  --hold therapeutic anticoagulation at this time, per Heme/Onc recs, given recent bleed    Cognitive communication deficit  SLP consulted      HILLARY (mycobacterium avium-intracellulare)  --continue ethambutol and azithromycin      Hyperlipidemia associated with type 2 diabetes mellitus  --hold statin given LFTs. Defer to resuming outpatient for long term benefit        VTE Risk Mitigation (From admission, onward)         Ordered     heparin (porcine) injection 5,000 Units  Every 8 hours         04/29/22 1704     Reason for No Pharmacological VTE Prophylaxis  Once        Question:  Reasons:  Answer:  Risk of Bleeding    04/28/22 2058     IP VTE HIGH RISK PATIENT  Once         04/28/22 2058     Place sequential compression device  Until discontinued         04/28/22 2058                    Katarzyna Ji DO  Department of Hospital Medicine   [facility] - Neurosurgery ([facility])

## 2022-05-11 LAB
ALBUMIN SERPL BCP-MCNC: 3.6 G/DL (ref 3.5–5.2)
ALP SERPL-CCNC: 111 U/L (ref 55–135)
ALT SERPL W/O P-5'-P-CCNC: 90 U/L (ref 10–44)
ANION GAP SERPL CALC-SCNC: 8 MMOL/L (ref 8–16)
APTT BLDCRRT: 26.8 SEC (ref 21–32)
AST SERPL-CCNC: 45 U/L (ref 10–40)
BASOPHILS # BLD AUTO: 0.05 K/UL (ref 0–0.2)
BASOPHILS NFR BLD: 0.3 % (ref 0–1.9)
BILIRUB SERPL-MCNC: 0.5 MG/DL (ref 0.1–1)
BUN SERPL-MCNC: 15 MG/DL (ref 8–23)
CALCIUM SERPL-MCNC: 9.4 MG/DL (ref 8.7–10.5)
CHLORIDE SERPL-SCNC: 114 MMOL/L (ref 95–110)
CO2 SERPL-SCNC: 26 MMOL/L (ref 23–29)
CREAT SERPL-MCNC: 0.8 MG/DL (ref 0.5–1.4)
DIFFERENTIAL METHOD: ABNORMAL
EOSINOPHIL # BLD AUTO: 0 K/UL (ref 0–0.5)
EOSINOPHIL NFR BLD: 0.1 % (ref 0–8)
ERYTHROCYTE [DISTWIDTH] IN BLOOD BY AUTOMATED COUNT: 17.9 % (ref 11.5–14.5)
EST. GFR  (AFRICAN AMERICAN): >60 ML/MIN/1.73 M^2
EST. GFR  (NON AFRICAN AMERICAN): >60 ML/MIN/1.73 M^2
GLUCOSE SERPL-MCNC: 144 MG/DL (ref 70–110)
HCT VFR BLD AUTO: 37.2 % (ref 40–54)
HGB BLD-MCNC: 12.1 G/DL (ref 14–18)
IMM GRANULOCYTES # BLD AUTO: 0.48 K/UL (ref 0–0.04)
IMM GRANULOCYTES NFR BLD AUTO: 2.6 % (ref 0–0.5)
INR PPP: 1 (ref 0.8–1.2)
LYMPHOCYTES # BLD AUTO: 1.3 K/UL (ref 1–4.8)
LYMPHOCYTES NFR BLD: 7.1 % (ref 18–48)
MAGNESIUM SERPL-MCNC: 2.2 MG/DL (ref 1.6–2.6)
MCH RBC QN AUTO: 27.9 PG (ref 27–31)
MCHC RBC AUTO-ENTMCNC: 32.5 G/DL (ref 32–36)
MCV RBC AUTO: 86 FL (ref 82–98)
MONOCYTES # BLD AUTO: 1.2 K/UL (ref 0.3–1)
MONOCYTES NFR BLD: 6.3 % (ref 4–15)
NEUTROPHILS # BLD AUTO: 15.4 K/UL (ref 1.8–7.7)
NEUTROPHILS NFR BLD: 83.6 % (ref 38–73)
NRBC BLD-RTO: 0 /100 WBC
PHOSPHATE SERPL-MCNC: 4.1 MG/DL (ref 2.7–4.5)
PLATELET # BLD AUTO: 304 K/UL (ref 150–450)
PMV BLD AUTO: 10.8 FL (ref 9.2–12.9)
POCT GLUCOSE: 130 MG/DL (ref 70–110)
POCT GLUCOSE: 143 MG/DL (ref 70–110)
POCT GLUCOSE: 154 MG/DL (ref 70–110)
POTASSIUM SERPL-SCNC: 4.6 MMOL/L (ref 3.5–5.1)
PROT SERPL-MCNC: 6.6 G/DL (ref 6–8.4)
PROTHROMBIN TIME: 10.1 SEC (ref 9–12.5)
RBC # BLD AUTO: 4.33 M/UL (ref 4.6–6.2)
SODIUM SERPL-SCNC: 139 MMOL/L (ref 136–145)
SODIUM SERPL-SCNC: 148 MMOL/L (ref 136–145)
SODIUM SERPL-SCNC: 148 MMOL/L (ref 136–145)
WBC # BLD AUTO: 18.43 K/UL (ref 3.9–12.7)

## 2022-05-11 PROCEDURE — 94761 N-INVAS EAR/PLS OXIMETRY MLT: CPT

## 2022-05-11 PROCEDURE — 25000003 PHARM REV CODE 250: Performed by: PHYSICIAN ASSISTANT

## 2022-05-11 PROCEDURE — 80053 COMPREHEN METABOLIC PANEL: CPT | Performed by: STUDENT IN AN ORGANIZED HEALTH CARE EDUCATION/TRAINING PROGRAM

## 2022-05-11 PROCEDURE — 25000003 PHARM REV CODE 250: Performed by: STUDENT IN AN ORGANIZED HEALTH CARE EDUCATION/TRAINING PROGRAM

## 2022-05-11 PROCEDURE — 97530 THERAPEUTIC ACTIVITIES: CPT

## 2022-05-11 PROCEDURE — 63600175 PHARM REV CODE 636 W HCPCS: Performed by: PHYSICIAN ASSISTANT

## 2022-05-11 PROCEDURE — 25000242 PHARM REV CODE 250 ALT 637 W/ HCPCS: Performed by: STUDENT IN AN ORGANIZED HEALTH CARE EDUCATION/TRAINING PROGRAM

## 2022-05-11 PROCEDURE — 97535 SELF CARE MNGMENT TRAINING: CPT

## 2022-05-11 PROCEDURE — 25000003 PHARM REV CODE 250

## 2022-05-11 PROCEDURE — 99024 PR POST-OP FOLLOW-UP VISIT: ICD-10-PCS | Mod: ,,, | Performed by: PHYSICIAN ASSISTANT

## 2022-05-11 PROCEDURE — 97530 THERAPEUTIC ACTIVITIES: CPT | Mod: CQ

## 2022-05-11 PROCEDURE — 99900035 HC TECH TIME PER 15 MIN (STAT)

## 2022-05-11 PROCEDURE — 85730 THROMBOPLASTIN TIME PARTIAL: CPT | Performed by: PHYSICIAN ASSISTANT

## 2022-05-11 PROCEDURE — 93010 EKG 12-LEAD: ICD-10-PCS | Mod: ,,, | Performed by: INTERNAL MEDICINE

## 2022-05-11 PROCEDURE — 36415 COLL VENOUS BLD VENIPUNCTURE: CPT

## 2022-05-11 PROCEDURE — 84100 ASSAY OF PHOSPHORUS: CPT | Performed by: STUDENT IN AN ORGANIZED HEALTH CARE EDUCATION/TRAINING PROGRAM

## 2022-05-11 PROCEDURE — 84295 ASSAY OF SERUM SODIUM: CPT | Mod: 91

## 2022-05-11 PROCEDURE — 92526 ORAL FUNCTION THERAPY: CPT

## 2022-05-11 PROCEDURE — 92507 TX SP LANG VOICE COMM INDIV: CPT

## 2022-05-11 PROCEDURE — 99024 POSTOP FOLLOW-UP VISIT: CPT | Mod: ,,, | Performed by: PHYSICIAN ASSISTANT

## 2022-05-11 PROCEDURE — 94640 AIRWAY INHALATION TREATMENT: CPT

## 2022-05-11 PROCEDURE — 97116 GAIT TRAINING THERAPY: CPT | Mod: CQ

## 2022-05-11 PROCEDURE — 99233 SBSQ HOSP IP/OBS HIGH 50: CPT | Mod: GC,,, | Performed by: HOSPITALIST

## 2022-05-11 PROCEDURE — 94668 MNPJ CHEST WALL SBSQ: CPT

## 2022-05-11 PROCEDURE — 11000001 HC ACUTE MED/SURG PRIVATE ROOM

## 2022-05-11 PROCEDURE — 83735 ASSAY OF MAGNESIUM: CPT | Performed by: STUDENT IN AN ORGANIZED HEALTH CARE EDUCATION/TRAINING PROGRAM

## 2022-05-11 PROCEDURE — 85025 COMPLETE CBC W/AUTO DIFF WBC: CPT | Performed by: STUDENT IN AN ORGANIZED HEALTH CARE EDUCATION/TRAINING PROGRAM

## 2022-05-11 PROCEDURE — 99233 PR SUBSEQUENT HOSPITAL CARE,LEVL III: ICD-10-PCS | Mod: GC,,, | Performed by: HOSPITALIST

## 2022-05-11 PROCEDURE — 85610 PROTHROMBIN TIME: CPT | Performed by: PHYSICIAN ASSISTANT

## 2022-05-11 PROCEDURE — 93005 ELECTROCARDIOGRAM TRACING: CPT

## 2022-05-11 PROCEDURE — 63600175 PHARM REV CODE 636 W HCPCS: Performed by: STUDENT IN AN ORGANIZED HEALTH CARE EDUCATION/TRAINING PROGRAM

## 2022-05-11 PROCEDURE — 25000003 PHARM REV CODE 250: Performed by: HOSPITALIST

## 2022-05-11 PROCEDURE — 93010 ELECTROCARDIOGRAM REPORT: CPT | Mod: ,,, | Performed by: INTERNAL MEDICINE

## 2022-05-11 PROCEDURE — 97112 NEUROMUSCULAR REEDUCATION: CPT

## 2022-05-11 PROCEDURE — 63700000 PHARM REV CODE 250 ALT 637 W/O HCPCS: Performed by: STUDENT IN AN ORGANIZED HEALTH CARE EDUCATION/TRAINING PROGRAM

## 2022-05-11 RX ADMIN — SENNOSIDES 8.6 MG: 8.6 TABLET ORAL at 08:05

## 2022-05-11 RX ADMIN — DEXAMETHASONE 2 MG: 1 TABLET ORAL at 08:05

## 2022-05-11 RX ADMIN — AZITHROMYCIN MONOHYDRATE 500 MG: 250 TABLET ORAL at 09:05

## 2022-05-11 RX ADMIN — IPRATROPIUM BROMIDE AND ALBUTEROL SULFATE 3 ML: 2.5; .5 SOLUTION RESPIRATORY (INHALATION) at 08:05

## 2022-05-11 RX ADMIN — THERA TABS 1 TABLET: TAB at 09:05

## 2022-05-11 RX ADMIN — FAMOTIDINE 20 MG: 20 TABLET ORAL at 09:05

## 2022-05-11 RX ADMIN — IPRATROPIUM BROMIDE AND ALBUTEROL SULFATE 3 ML: 2.5; .5 SOLUTION RESPIRATORY (INHALATION) at 12:05

## 2022-05-11 RX ADMIN — DEXAMETHASONE 2 MG: 1 TABLET ORAL at 09:05

## 2022-05-11 RX ADMIN — IPRATROPIUM BROMIDE AND ALBUTEROL SULFATE 3 ML: 2.5; .5 SOLUTION RESPIRATORY (INHALATION) at 01:05

## 2022-05-11 RX ADMIN — FOLIC ACID 1 MG: 1 TABLET ORAL at 09:05

## 2022-05-11 RX ADMIN — THIAMINE HCL TAB 100 MG 100 MG: 100 TAB at 09:05

## 2022-05-11 RX ADMIN — SILODOSIN 4 MG: 4 CAPSULE ORAL at 09:05

## 2022-05-11 RX ADMIN — FAMOTIDINE 20 MG: 20 TABLET ORAL at 08:05

## 2022-05-11 RX ADMIN — SODIUM CHLORIDE SOLN NEBU 3% 4 ML: 3 NEBU SOLN at 08:05

## 2022-05-11 RX ADMIN — QUETIAPINE FUMARATE 50 MG: 25 TABLET ORAL at 08:05

## 2022-05-11 RX ADMIN — HEPARIN SODIUM 5000 UNITS: 5000 INJECTION INTRAVENOUS; SUBCUTANEOUS at 02:05

## 2022-05-11 RX ADMIN — HEPARIN SODIUM 5000 UNITS: 5000 INJECTION INTRAVENOUS; SUBCUTANEOUS at 10:05

## 2022-05-11 RX ADMIN — ACETAMINOPHEN 1000 MG: 500 TABLET ORAL at 03:05

## 2022-05-11 RX ADMIN — HEPARIN SODIUM 5000 UNITS: 5000 INJECTION INTRAVENOUS; SUBCUTANEOUS at 06:05

## 2022-05-11 RX ADMIN — LIDOCAINE 1 PATCH: 50 PATCH CUTANEOUS at 12:05

## 2022-05-11 RX ADMIN — Medication 9 MG: at 08:05

## 2022-05-11 RX ADMIN — ETHAMBUTOL HYDROCHLORIDE 800 MG: 400 TABLET, FILM COATED ORAL at 09:05

## 2022-05-11 RX ADMIN — ACETAMINOPHEN 1000 MG: 500 TABLET ORAL at 08:05

## 2022-05-11 RX ADMIN — SENNOSIDES 8.6 MG: 8.6 TABLET ORAL at 09:05

## 2022-05-11 RX ADMIN — POLYETHYLENE GLYCOL 3350 17 G: 17 POWDER, FOR SOLUTION ORAL at 09:05

## 2022-05-11 NOTE — PROGRESS NOTES
[facility] Neuro[facility] ([facility])  Neurosurgery  Progress Note    Subjective:     History of Present Illness: Patient is a 80 y/o male with antiphospholipid syndrome on chronic coumadin, MAC pneumonia, HLD, and left PE diagnosed January 2021 who was transferred from  for compressive epidural hematoma in the cervical spine. Patient reports that he woke up in the middle of the night and could no move the right side of his body. He went to  ED and stroke code initiated. Patient was not given tPA but given ASA. CTA was negative for acute CVA but did show compressive fluid collective from C3-C6. MRI cervical spine confirmed. [place] [facility] [place]. He reports he cannot move his right arm out of the contracted position, he also cannot lift his right leg off the bed. He reports the right arm and leg are painful. No complaints on left side of body. Denies b/b dysfunction.       Post-Op Info:  Procedure(s) (LRB):  LAMINECTOMY, SPINE, CERVICAL, POSTERIOR APPROACH C3-6 (Bilateral)   13 Days Post-Op     Interval History: Patient attempting to climb out of bed early this AM, redirectable. Weaning steroids, end date 5/13. Motor exam stable. Pending SNF    Medications:  Continuous Infusions:  Scheduled Meds:   acetaminophen  1,000 mg Oral TID    albuterol-ipratropium  3 mL Nebulization Q6H    azithromycin  500 mg Oral Daily    dexAMETHasone  2 mg Oral Q12H    Followed by    [START ON 5/13/2022] dexAMETHasone  2 mg Oral Daily    ethambutoL  800 mg Oral Daily    famotidine  20 mg Oral BID    folic acid  1 mg Oral Daily    heparin (porcine)  5,000 Units Subcutaneous Q8H    LIDOcaine  1 patch Transdermal Q24H    melatonin  9 mg Oral Nightly    multivitamin  1 tablet Oral Daily    polyethylene glycol  17 g Oral Daily    QUEtiapine  50 mg Oral QHS    senna  8.6 mg Oral BID    silodosin  4 mg Oral Daily    sodium chloride 3%  4 mL Nebulization BID    thiamine  100 mg Oral  "Daily     PRN Meds:acetaminophen, dextrose 10%, dextrose 10%, glucagon (human recombinant), insulin aspart U-100, lactulose, ondansetron, oxyCODONE, QUEtiapine     Review of Systems  Objective:     Weight: 74.8 kg (165 lb)  Body mass index is 24.37 kg/m².  Vital Signs (Most Recent):  Temp: 98.1 °F (36.7 °C) (05/11/22 1237)  Pulse: (!) 112 (05/11/22 1255)  Resp: 17 (05/11/22 1237)  BP: 113/64 (05/11/22 1237)  SpO2: 95 % (05/11/22 1237)   Vital Signs (24h Range):  Temp:  [97.1 °F (36.2 °C)-98.4 °F (36.9 °C)] 98.1 °F (36.7 °C)  Pulse:  [] 112  Resp:  [14-19] 17  SpO2:  [93 %-99 %] 95 %  BP: (113-123)/(57-69) 113/64     Date 05/11/22 0700 - 05/12/22 0659   Shift 5125-3689 2809-8962 7515-7164 24 Hour Total   INTAKE   Shift Total(mL/kg)       OUTPUT   Urine(mL/kg/hr) 1600   1600   Shift Total(mL/kg) 1600(21.4)   1600(21.4)   Weight (kg) 74.8 74.8 74.8 74.8                        Urethral Catheter 05/05/22 0200 Straight-tip;Non-latex (Active)   Site Assessment Clean;Dry;Intact 05/10/22 2000   Collection Container Standard drainage bag 05/10/22 2000   Securement Method secured to top of thigh w/ adhesive device 05/10/22 2000   Catheter Care Performed yes 05/10/22 2000   Reason for Continuing Urinary Catheterization Placed by Urology Service 05/10/22 2000   CAUTI Prevention Bundle Securement Device in place with 1" slack;Intact seal between catheter & drainage tubing;Drainage bag/urimeter off the floor;Sheeting clip in use;No dependent loops or kinks;Drainage bag/urimeter not overfilled (<2/3 full);Drainage bag/urimeter below bladder 05/10/22 2000   Output (mL) 450 mL 05/10/22 1600       Physical Exam    Neurosurgery Physical Exam  General: well developed, well nourished, no distress.   Head: normocephalic, atraumatic  Neurologic: Alert and oriented. Thought content somewhat appropriate. Some conversation hard to follow, patient with higher level confusion  Mental Status: Awake, Alert, Oriented to self and place, no " year. Confusion present. Follows commands.   Language: No aphasia  Speech: No dysarthria  Cranial nerves: face symmetric,   Eyes: pupils equal, round, reactive to light with accommodation, EOMI.   Pulmonary: normal respirations, no signs of respiratory distress  Abdomen: soft,slightly distended  Skin: Skin is warm, dry and intact.  Sensory: intact to light touch throughout     Motor Strength:Moves all extremities spontaneously with good tone.  Full strength upper and lower extremities. Intermittent twitching noted to BUE/BLE   Strength   Deltoids Triceps Biceps Wrist Extension Wrist Flexion Hand    Upper: R 5/5 5/5 5/5 5/5 5/5 5/5     L 5/5 5/5 5/5 5/5 5/5 5/5       Iliopsoas Quadriceps Knee  Flexion Tibialis  anterior Gastro- cnemius EHL   Lower: R 5/5 5/5 5/5 5/5 5/5 5/5     L 5/5 5/5 5/5 5/5 5/5 5/5      Posterior cervical incision: Clean, dry, staples intact.      Significant Labs:  Recent Labs   Lab 05/10/22  0827 05/10/22  2021 05/11/22  0717 05/11/22  0849   *  --  144*  --      144 141 148* 148*   K 4.0  --  4.6  --    *  --  114*  --    CO2 23  --  26  --    BUN 15  --  15  --    CREATININE 0.7  --  0.8  --    CALCIUM 9.1  --  9.4  --    MG 2.1  --  2.2  --      Recent Labs   Lab 05/10/22  0827 05/11/22  0717   WBC 18.56* 18.43*   HGB 11.8* 12.1*   HCT 36.6* 37.2*    304     Recent Labs   Lab 05/10/22  0827 05/11/22  0717   INR 1.0 1.0   APTT 26.6 26.8     Microbiology Results (last 7 days)       Procedure Component Value Units Date/Time    Blood culture [[id number]] Collected: 05/03/22 1542    Order Status: Completed Specimen: Blood from Peripheral, Left Hand Updated: 05/08/22 1612     Blood Culture, Routine No growth after 5 days.    Narrative:      Collection has been rescheduled by 6 at 05/03/2022 12:38 Reason:   Patient unavailable going yo ct will yry back  Collection has been rescheduled by TH6 at 05/03/2022 12:41 Reason:   Spe with rn romy Patient unavailable going  to ct  Collection has been rescheduled by 6 at 05/03/2022 14:03 Reason:   Patient unavailable in ultrasound sabino valenzuela said come back in an hr   Collection has been rescheduled by 6 at 05/03/2022 12:38 Reason:   Patient unavailable going yo ct will yry back  Collection has been rescheduled by 6 at 05/03/2022 12:41 Reason:   Spe with sabino stanton Patient unavailable going to ct  Collection has been rescheduled by 6 at 05/03/2022 14:03 Reason:   Patient unavailable in ultrasound rn nicolas said come back in an hr     Blood culture [[id number]] Collected: 05/03/22 1543    Order Status: Completed Specimen: Blood from Peripheral, Right Hand Updated: 05/08/22 1612     Blood Culture, Routine No growth after 5 days.    Narrative:      Collection has been rescheduled by [facility] at 05/03/2022 12:38 Reason:   Patient unavailable going yo ct will yry back  Collection has been rescheduled by [facility] at 05/03/2022 12:41 Reason:   Spe with sabino stanton Patient unavailable going to ct  Collection has been rescheduled by [facility] at 05/03/2022 14:03 Reason:   Patient unavailable in ultrasound sabino valenzuela said come back in an hr   Collection has been rescheduled by [facility] at 05/03/2022 12:38 Reason:   Patient unavailable going yo ct will yry back  Collection has been rescheduled by [facility] at 05/03/2022 12:41 Reason:   Spe with sabino stanton Patient unavailable going to ct  Collection has been rescheduled by [facility] at 05/03/2022 14:03 Reason:   Patient unavailable in ultrasound rn nicolas said come back in an hr     Culture, Anaerobe [[id number]]     Order Status: Completed Specimen: Neck     Aerobic culture [[id number]]     Order Status: Completed Specimen: Neck     AFB Culture & Smear [[id number]]     Order Status: No result Specimen: Respiratory from Sputum, Expectorated     Culture, Respiratory with Gram Stain [[id number]]     Order Status: No result Specimen: Respiratory           All pertinent labs from the last 24 hours have been reviewed.    Significant  Diagnostics:  US Lower Extremity Veins Bilateral    Result Date: 5/11/2022  No evidence of deep venous thrombosis in either lower extremity. Electronically signed by resident: Farshad Lorenzo Date:    05/11/2022 Time:    10:25 Electronically signed by: Donovan Marie MD Date:    05/11/2022 Time:    10:31       Assessment/Plan:     * Epidural hematoma  80 y/o male with APS on coumadin, hx PE, MAC pneumonia, HLD with spontaneous C3-C6 epidural hematoma compressing the spinal cord    S/p  C3-C6 laminectomy and fusion for evacuation of epidural hematoma 4/28     Stepped down to NPU    -- CTH 4/29 no acute intracranial process   -- Post op MRI C spine satisfactory decompression with continued cord signal edema   --postop XR C spine hardware in good position  -- Acute encephalopathy:    --CTH 5/3 ordered per medicine team for some confusion: without evidence of acute hemorrhage.    --Hypernatremia: Na WNL. continue to monitor daily  --Agitation:    -Minimize use of restraints    -Scheduled 25 mg Seroquel qhs. Monitor QTc. Daily EKG. PRN Seroquel. Discontinue depakote for elevated LFTs   -MRI brain w/wo contrast: no acute infarct or hemorrhage.    -Agitation most likely 2/2 delirium and steroids, steroid taper in to be completed 5/13  -- Bilateral hydroureteronephrosis: Catheter in place. Keep in place until Urology follow up, 2 week follow up with Urology scheduled   -- C-collar to be worn when OOB  -- HV drains removed 4/30  -- History of antiphospholipid syndrome and PE: hold warfarin. Heme/onc signed off.    -Plan for outpatient follow up 5/27  -- SLP recommending regular diet with nectar thick liquids.  -- Dex taper to off. PPI while on steroids  -- Constipation: Bowel movement s/p enema on 5/3  -- Activity: PT/OT recs for SNF  -- DVT prophylaxis: TEDs/SCDs/SQH. - BLE US 5/11/22 negative for DVT  --Concern for hematoma vs abscess. Final path returned as blood clot. Daptomycin discontinued per ID recs.    - Specimen sent  for cultures 5/6. CTM.    Dispo: Denied IPR, pending SNF        Elysia Dexter PA-C  Neurosurgery  [facility] - Neurosurgery ([facility])

## 2022-05-11 NOTE — PT/OT/SLP PROGRESS
Physical Therapy Treatment    Patient Name:  Diego Gunter   MRN:  [id number]    Recommendations:     Discharge Recommendations:  nursing facility, skilled   Discharge Equipment Recommendations:  (TBD)   Barriers to discharge: impaired functional mobility requiring increased assistance    Assessment:     Diego Gunter is a 81 y.o. male admitted with a medical diagnosis of Epidural hematoma.  He presents with the following impairments/functional limitations:  weakness, impaired endurance, impaired self care skills, impaired functional mobilty, gait instability, impaired balance, decreased lower extremity function, decreased upper extremity function, decreased ROM, decreased coordination, impaired coordination, impaired fine motor, impaired sensation, impaired joint extensibility, impaired cognition, decreased safety awareness, orthopedic precautions. Pt tolerated session well with focus on bed mobility, transfers, and gait training. Pt with improvement in cognition and alertness but continues to demonstrate decreased endurance with decreasing level of alertness and attention to task over duration of session. Pt stands from EOB with multiple trials and attempts gait fwd/bwd and side steps with poor motor planning and intermittent resistance to physical assistance. Pt demonstrating need for 2 persons to ambulate d/t significant instability and motor planning delays/difficulties. Pt will continue to benefit from therapy services to address impairments listed above.     Rehab Prognosis: Good; patient would benefit from acute skilled PT services to address these deficits and reach maximum level of function.    Recent Surgery: Procedure(s) (LRB):  LAMINECTOMY, SPINE, CERVICAL, POSTERIOR APPROACH C3-6 (Bilateral) 13 Days Post-Op    Plan:     During this hospitalization, patient to be seen 4 x/week to address the identified rehab impairments via gait training, therapeutic activities, therapeutic exercises, neuromuscular  re-education and progress toward the following goals:    · Plan of Care Expires:  05/30/22    Subjective     Chief Complaint: fear of falling; headache  Patient/Family Comments/goals: family with multiple questions regarding pt progression and information about SNF setting and facility choices; education provided within scope of PTA, deferred information on facility options and choosing preference to /  Pain/Comfort:  · Pain Rating 1: unrated c/o headache while sitting at EOB following multiple standing trials  · Pain Rating Post-Intervention 1: no pain reported at rest in bed in chair position      Objective:     Communicated with RN prior to session.  Patient found HOB elevated with telemetry, amaya catheter, cervical collar, SCD upon PTA entry to room. Family at bedside.     General Precautions: Standard, fall, aspiration   Orthopedic Precautions:spinal precautions   Braces: Cervical collar  Respiratory Status: Room air     Functional Mobility:  · Bed Mobility:     · Supine to Sit: moderate assistance  · Sit to Supine: maximal assistance  · Transfers:     · Sit to Stand:  moderate assistance with hand-held assist  · Seated Scooting: Moderate assistance with HHA to pts L; tendency for excessive trunk extension while attempting to scoot laterally  · Gait: Attempted ambulation x 3 trials with attempts for fwd/bwd and side steps. Pt takes 2 steps forward with pt unable to take steps back towards EOB. Pt takes singles step to L. Max to Total A for gait training d/t significant motor planning delays/difficulties, difficulty with LLE weight acceptance, and inability to progress RLE.          AM-PAC 6 CLICK MOBILITY  Turning over in bed (including adjusting bedclothes, sheets and blankets)?: 2  Sitting down on and standing up from a chair with arms (e.g., wheelchair, bedside commode, etc.): 2  Moving from lying on back to sitting on the side of the bed?: 2  Moving to and from a bed to a chair  (including a wheelchair)?: 2  Need to walk in hospital room?: 2  Climbing 3-5 steps with a railing?: 1  Basic Mobility Total Score: 11       Therapeutic Activities and Exercises:   Pt assisted with functional mobility as noted above.   Sit<>stand x 4 trials with HHA and BLE blocking. Tendency for posterior and L lateral lean. Intermittent resistance to physical assistance. Requires cues for attention to therapist/tasks.   Pt sits at EOB for ~10 minutes with focus on decreasing pts unintentional resistance, improving upright posture, and facilitating midline balance. Mild improvement noted with gentle cueing and increased time. Tendency for startled response to sudden movements or motion within pts visual field.   Time spent in education of pt and family on expectations and function/role of skilled nursing facilities to the rehabilitation of patients. Addressed families questions/concerns within scope of PTA. Deferred all other information family seeking to /.     Patient left with bed in chair position with all lines intact, call button in reach, RN  notified and family present.    GOALS:   Multidisciplinary Problems     Physical Therapy Goals        Problem: Physical Therapy    Goal Priority Disciplines Outcome Goal Variances Interventions   Physical Therapy Goal     PT, PT/OT Ongoing, Progressing     Description: Goals to be met by: 2022     Patient will increase functional independence with mobility by performin. Supine to sit with minimum assistance  2. Sit to supine with minimum assistance - met 2022  Updated: supervision while maintaining spinal precautions   3. Sit to stand transfer with minimum assistance - met 2022  Updated: supervision   4. Bed to chair transfer with minimum assistance using LRAD as needed  5. Gait  x 25 feet with minimum assistance using LRAD as needed  6. Lower extremity exercise program x10 reps per handout, with independence  7. Recall 3/3  spinal precautions and how to don/doff c-collar with supervision                    Time Tracking:     PT Received On: 05/11/22  PT Start Time: 1250     PT Stop Time: 1328  PT Total Time (min): 38 min     Billable Minutes: Gait Training 10 and Therapeutic Activity 28    Treatment Type: Treatment  PT/PTA: PTA     PTA Visit Number: 1     05/11/2022

## 2022-05-11 NOTE — HOSPITAL COURSE
HM consulted for encephalopathy likely secondary to steroids and hospital delirium in setting of likely diminished neurocognitive function. Started on Seroquel qhs. Initially improved yesterday and slept well; however, with mild agitation this morning. Weaning steroids and working on rehab placement.

## 2022-05-11 NOTE — PLAN OF CARE
SW completed the LOCET via phone. SW faxed \[facility]\"" to obtain the 142 for NH admission.      Irma Saravia, CHRISTEL  [phone]

## 2022-05-11 NOTE — PT/OT/SLP PROGRESS
Occupational Therapy   Treatment    Patient Name:  Diego Gunter   MRN:  [id number]  Admit Date: 4/28/2022  Admitting Diagnosis:  Epidural hematoma   Length of Stay: 13 days  Recent Surgery: Procedure(s) (LRB):  LAMINECTOMY, SPINE, CERVICAL, POSTERIOR APPROACH C3-6 (Bilateral) 13 Days Post-Op    Procedure(s):  LAMINECTOMY, SPINE, CERVICAL, POSTERIOR APPROACH C3-6     Recommendations:     Discharge Recommendations: nursing facility, skilled  Discharge Equipment Recommendations:   (TBD)  Barriers to discharge:   (increased assistance needed)    Plan:     Patient to be seen 4 x/week to address the above listed problems via self-care/home management, therapeutic activities, therapeutic exercises, neuromuscular re-education, cognitive retraining  · Plan of Care Expires: 05/27/22  · Plan of Care Reviewed with: patient, spouse    Assessment:   Diego Gunter is a 81 y.o. male with a medical diagnosis of Epidural hematoma.  He presents with the following performance deficits affecting function: weakness, impaired endurance, impaired self care skills, decreased lower extremity function, gait instability, decreased safety awareness, impaired functional mobilty, impaired balance, decreased upper extremity function, decreased ROM, impaired coordination, impaired fine motor, impaired cognition, impaired sensation, impaired joint extensibility.  Pt would benefit from skilled OT services in order to maximize independence with ADLs and facilitate safe discharge. Pt would benefit from SNF upon discharge to return to OF and decrease burden of care. .    Time spent performing bed mobility, EOB activity, practicing STS, transfers and UE ROM;     Relevant hx and current limitations:   BUE weakness   Poor attention to task   Full body myoclonic jerks   Improved arousal and alertness    Pt continues to require significant assist with functional mobility and safe completion of ADLs requiring Total-Mod A.  Patient is making  "progress towards goals.    Rehab Prognosis: Good; patient would benefit from acute skilled OT services to address these deficits and reach maximum level of function.        Subjective   Communicated with: SHARON Torres prior to session.  Patient found HOB elevated with telemetry, amaya catheter, SCD, cervical collar upon OT entry to room.    Patient: pt's spouse -"he was doing so good last week, but this week he seems a lot worse"    Pain/Comfort:  · Pain Rating 1: 0/10    Objective:   Patient found with: telemetry, amaya catheter, SCD, cervical collar   General Precautions: Standard, fall, aspiration   Orthopedic Precautions:spinal precautions   Braces: Cervical collar   Oxygen Device: Room Air  Vitals: /64 (BP Location: Left arm, Patient Position: Lying)   Pulse (!) 112   Temp 98.1 °F (36.7 °C) (Axillary)   Resp 17   Ht 5' 9" (1.753 m)   Wt 74.8 kg (165 lb)   SpO2 95%   BMI 24.37 kg/m²     Outcome Measures:  AMPAC 6 Click ADL: 12    Occupational Performance:  Bed Mobility:       Rolling left: moderate assistance   Scooting: towards EOB with maximal assistance   Supine to Sit: moderate assistance   Sit to Supine: moderate assistance    Functional Mobility/Transfers:     Sit to Stand: Mod-Min A using hand-held assist   o Initial STS from EOB: Mod A w/ B knees blocked  o 2 stands from chair, initial Mod A and 3rd stand w/ Min A w/ B knees guarded   Bed <> Chair: Stand Pivot with maximal assistance (2nd person for guiding to chair) using hand-held assist (pt t/f to and from EOB this date)   Pt ambulated ~5 steps fwd/bwd with Max A to simulate household and/or community distances in order to maximize functional activity tolerance and dynamic standing balance required for engagement in occupations of choice.   o LOB: No; however pt w/ difficulty performing weight shift and maintaining upright position when stepping; ataxic gait and poor foot placement despite cues, B knees buckling  o SOB: " No  o Dizziness: No    Activities of Daily Living:     Not observed this date      [facility] 6 Click ADL:  [facility] Total Score: 12    Therapeutic Exercise and/or Activity:  -AAROM performed BUE in all joints/planes of motion with stretches provided at end range, as indicated for recovery of effective participation in functional tasks, and to bolster proper circulation.  Sustained stretch provided for shoulder flexion and elbow extension at end range.   Assistance and facilitation provided for inferior angle of the scapula during shoulder flexion and abduction.     -BUE joint approximation provided to normalize tone.   o Pt tasked w/ reaching for cube w/ BUE; pt noted improved ability to perform w/ LUE when tactile cue provided to elbow  o BUE AAROM performed w/ scapular protraction/retraction sitting in chair    Treatment & Education:   Therapist provided facilitation and instruction of proper body mechanics, energy conservation, and fall prevention strategies during tasks listed above.   Pt re-educated on importance of OOB activities with staff member assistance and sitting OOB majority of the day.    Updated communication board with level of assist required (Max A stand pivot) & educated RN/patient that pt is appropriate for transfers and mobility with RN/PCT.       Patient left HOB elevated with all lines intact, call button in reach, bed alarm on, RN notified and spouse present    GOALS:   Multidisciplinary Problems     Occupational Therapy Goals        Problem: Occupational Therapy    Goal Priority Disciplines Outcome Interventions   Occupational Therapy Goal     OT, PT/OT Ongoing, Progressing    Description: Goals to be met by: 5/28/2022     Patient will increase functional independence with ADLs by performing:    Feeding with Set-up Assistance.  UE Dressing with Minimal Assistance.  LE Dressing with Moderate Assistance.  Grooming while seated with Set-up Assistance.  Toileting from bedside commode with Minimal  Assistance for hygiene and clothing management.   Sitting at edge of bed x15 minutes with Supervision.  Toilet transfer to bedside commode with Contact Guard Assistance.                     Time Tracking:     OT Date of Treatment: 05/11/22  OT Start Time: 1044  OT Stop Time: 1114  OT Total Time (min): 30 min    Additional staff present: Rehab Tech    Billable Minutes:  Therapeutic Activity 15  Neuromuscular Re-education 15      Verna (Jose Eduardo), OTR/L  [phone] (Pager #)  5/11/2022

## 2022-05-11 NOTE — SUBJECTIVE & OBJECTIVE
Interval History: please see above     Review of Systems   Unable to perform ROS: Mental status change   Objective:     Vital Signs (Most Recent):  Temp: 98.1 °F (36.7 °C) (05/11/22 1237)  Pulse: (!) 112 (05/11/22 1255)  Resp: 17 (05/11/22 1237)  BP: 113/64 (05/11/22 1237)  SpO2: 95 % (05/11/22 1237)   Vital Signs (24h Range):  Temp:  [97.1 °F (36.2 °C)-98.4 °F (36.9 °C)] 98.1 °F (36.7 °C)  Pulse:  [] 112  Resp:  [14-19] 17  SpO2:  [93 %-99 %] 95 %  BP: (113-123)/(57-69) 113/64     Weight: 74.8 kg (165 lb)  Body mass index is 24.37 kg/m².    Intake/Output Summary (Last 24 hours) at 5/11/2022 1414  Last data filed at 5/11/2022 1100  Gross per 24 hour   Intake 100 ml   Output 3230 ml   Net -3130 ml      Physical Exam  Constitutional:       General: He is not in acute distress.     Appearance: He is not ill-appearing or toxic-appearing.   HENT:      Head: Normocephalic and atraumatic.      Nose: No congestion or rhinorrhea.      Mouth/Throat:      Mouth: Mucous membranes are dry.      Pharynx: Oropharynx is clear.   Eyes:      General: No scleral icterus.     Extraocular Movements: Extraocular movements intact.      Conjunctiva/sclera: Conjunctivae normal.   Cardiovascular:      Rate and Rhythm: Normal rate and regular rhythm.      Pulses: Normal pulses.      Heart sounds: Normal heart sounds.   Pulmonary:      Effort: Pulmonary effort is normal.      Breath sounds: Normal breath sounds. No wheezing.   Abdominal:      General: Abdomen is flat. Bowel sounds are normal. There is no distension.      Palpations: Abdomen is soft.      Tenderness: There is no abdominal tenderness. There is no guarding or rebound.   Musculoskeletal:         General: No swelling.      Right lower leg: No edema.      Left lower leg: No edema.   Skin:     General: Skin is warm.      Coloration: Skin is not jaundiced.   Neurological:      General: No focal deficit present.      Mental Status: He is alert.      Comments: Intermittent  agitation and confusion, oriented to person. Mumbled speech this AM        Significant Labs: All pertinent labs within the past 24 hours have been reviewed.  BMP:   Recent Labs   Lab 05/11/22  0717 05/11/22  0849   *  --    * 148*   K 4.6  --    *  --    CO2 26  --    BUN 15  --    CREATININE 0.8  --    CALCIUM 9.4  --    MG 2.2  --      CBC:   Recent Labs   Lab 05/10/22  0827 05/11/22  0717   WBC 18.56* 18.43*   HGB 11.8* 12.1*   HCT 36.6* 37.2*    304       Significant Imaging: I have reviewed all pertinent imaging results/findings within the past 24 hours.

## 2022-05-11 NOTE — PLAN OF CARE
CM in communication with spouse and grandson to advise that [facility] does not have any skilled beds until 5/17.  CM advised that the 2 accepting facilities are [facility] and [facility].  Family adamant against [facility].  CM attempted to schedule a tour for family with [facility] but they were not able to accommodate a tour today.  They will tour on Thursday to advise whether this is an acceptable facility.     Patient had previously been denied Rehab by [facility]. Rec changed to Skilled.  [facility] with [facility] had stated they would approve Skilled.

## 2022-05-11 NOTE — SUBJECTIVE & OBJECTIVE
Interval History: Patient attempting to climb out of bed early this AM, redirectable. Weaning steroids, end date 5/13. Motor exam stable. Pending SNF    Medications:  Continuous Infusions:  Scheduled Meds:   acetaminophen  1,000 mg Oral TID    albuterol-ipratropium  3 mL Nebulization Q6H    azithromycin  500 mg Oral Daily    dexAMETHasone  2 mg Oral Q12H    Followed by    [START ON 5/13/2022] dexAMETHasone  2 mg Oral Daily    ethambutoL  800 mg Oral Daily    famotidine  20 mg Oral BID    folic acid  1 mg Oral Daily    heparin (porcine)  5,000 Units Subcutaneous Q8H    LIDOcaine  1 patch Transdermal Q24H    melatonin  9 mg Oral Nightly    multivitamin  1 tablet Oral Daily    polyethylene glycol  17 g Oral Daily    QUEtiapine  50 mg Oral QHS    senna  8.6 mg Oral BID    silodosin  4 mg Oral Daily    sodium chloride 3%  4 mL Nebulization BID    thiamine  100 mg Oral Daily     PRN Meds:acetaminophen, dextrose 10%, dextrose 10%, glucagon (human recombinant), insulin aspart U-100, lactulose, ondansetron, oxyCODONE, QUEtiapine     Review of Systems  Objective:     Weight: 74.8 kg (165 lb)  Body mass index is 24.37 kg/m².  Vital Signs (Most Recent):  Temp: 98.1 °F (36.7 °C) (05/11/22 1237)  Pulse: (!) 112 (05/11/22 1255)  Resp: 17 (05/11/22 1237)  BP: 113/64 (05/11/22 1237)  SpO2: 95 % (05/11/22 1237)   Vital Signs (24h Range):  Temp:  [97.1 °F (36.2 °C)-98.4 °F (36.9 °C)] 98.1 °F (36.7 °C)  Pulse:  [] 112  Resp:  [14-19] 17  SpO2:  [93 %-99 %] 95 %  BP: (113-123)/(57-69) 113/64     Date 05/11/22 0700 - 05/12/22 0659   Shift 0130-0336 1606-7470 1914-3636 24 Hour Total   INTAKE   Shift Total(mL/kg)       OUTPUT   Urine(mL/kg/hr) 1600   1600   Shift Total(mL/kg) 1600(21.4)   1600(21.4)   Weight (kg) 74.8 74.8 74.8 74.8                        Urethral Catheter 05/05/22 0200 Straight-tip;Non-latex (Active)   Site Assessment Clean;Dry;Intact 05/10/22 2000   Collection Container Standard drainage bag 05/10/22 2000  "  Securement Method secured to top of thigh w/ adhesive device 05/10/22 2000   Catheter Care Performed yes 05/10/22 2000   Reason for Continuing Urinary Catheterization Placed by Urology Service 05/10/22 2000   CAUTI Prevention Bundle Securement Device in place with 1" slack;Intact seal between catheter & drainage tubing;Drainage bag/urimeter off the floor;Sheeting clip in use;No dependent loops or kinks;Drainage bag/urimeter not overfilled (<2/3 full);Drainage bag/urimeter below bladder 05/10/22 2000   Output (mL) 450 mL 05/10/22 1600       Physical Exam    Neurosurgery Physical Exam  General: well developed, well nourished, no distress.   Head: normocephalic, atraumatic  Neurologic: Alert and oriented. Thought content somewhat appropriate. Some conversation hard to follow, patient with higher level confusion  Mental Status: Awake, Alert, Oriented to self and place, no year. Confusion present. Follows commands.   Language: No aphasia  Speech: No dysarthria  Cranial nerves: face symmetric,   Eyes: pupils equal, round, reactive to light with accommodation, EOMI.   Pulmonary: normal respirations, no signs of respiratory distress  Abdomen: soft,slightly distended  Skin: Skin is warm, dry and intact.  Sensory: intact to light touch throughout     Motor Strength:Moves all extremities spontaneously with good tone.  Full strength upper and lower extremities. Intermittent twitching noted to BUE/BLE   Strength   Deltoids Triceps Biceps Wrist Extension Wrist Flexion Hand    Upper: R 5/5 5/5 5/5 5/5 5/5 5/5     L 5/5 5/5 5/5 5/5 5/5 5/5       Iliopsoas Quadriceps Knee  Flexion Tibialis  anterior Gastro- cnemius EHL   Lower: R 5/5 5/5 5/5 5/5 5/5 5/5     L 5/5 5/5 5/5 5/5 5/5 5/5      Posterior cervical incision: Clean, dry, staples intact.      Significant Labs:  Recent Labs   Lab 05/10/22  0827 05/10/22  2021 05/11/22  0717 05/11/22  0849   *  --  144*  --      144 141 148* 148*   K 4.0  --  4.6  --    CL " 113*  --  114*  --    CO2 23  --  26  --    BUN 15  --  15  --    CREATININE 0.7  --  0.8  --    CALCIUM 9.1  --  9.4  --    MG 2.1  --  2.2  --      Recent Labs   Lab 05/10/22  0827 05/11/22  0717   WBC 18.56* 18.43*   HGB 11.8* 12.1*   HCT 36.6* 37.2*    304     Recent Labs   Lab 05/10/22  0827 05/11/22  0717   INR 1.0 1.0   APTT 26.6 26.8     Microbiology Results (last 7 days)       Procedure Component Value Units Date/Time    Blood culture [[id number]] Collected: 05/03/22 1542    Order Status: Completed Specimen: Blood from Peripheral, Left Hand Updated: 05/08/22 1612     Blood Culture, Routine No growth after 5 days.    Narrative:      Collection has been rescheduled by [facility] at 05/03/2022 12:38 Reason:   Patient unavailable going yo ct will yry back  Collection has been rescheduled by [facility] at 05/03/2022 12:41 Reason:   Spe with sabino stanton Patient unavailable going to ct  Collection has been rescheduled by [facility] at 05/03/2022 14:03 Reason:   Patient unavailable in ultrasound sabino valenzuela said come back in an hr   Collection has been rescheduled by [facility] at 05/03/2022 12:38 Reason:   Patient unavailable going yo ct will yry back  Collection has been rescheduled by [facility] at 05/03/2022 12:41 Reason:   Spe with sabino stanton Patient unavailable going to ct  Collection has been rescheduled by [facility] at 05/03/2022 14:03 Reason:   Patient unavailable in ultrasound sabino valenzuela said come back in an hr     Blood culture [[id number]] Collected: 05/03/22 1543    Order Status: Completed Specimen: Blood from Peripheral, Right Hand Updated: 05/08/22 1612     Blood Culture, Routine No growth after 5 days.    Narrative:      Collection has been rescheduled by [facility] at 05/03/2022 12:38 Reason:   Patient unavailable going yo ct will yry back  Collection has been rescheduled by [facility] at 05/03/2022 12:41 Reason:   Spe with sabino stanton Patient unavailable going to ct  Collection has been rescheduled by [facility] at 05/03/2022 14:03 Reason:   Patient unavailable in  ultrasound rn nicolas said come back in an hr   Collection has been rescheduled by 6 at 05/03/2022 12:38 Reason:   Patient unavailable going yo ct will yry back  Collection has been rescheduled by [facility] at 05/03/2022 12:41 Reason:   Spe with rn romy Patient unavailable going to ct  Collection has been rescheduled by 6 at 05/03/2022 14:03 Reason:   Patient unavailable in ultrasound sabino valenzuela said come back in an hr     Culture, Anaerobe [[id number]]     Order Status: Completed Specimen: Neck     Aerobic culture [[id number]]     Order Status: Completed Specimen: Neck     AFB Culture & Smear [[id number]]     Order Status: No result Specimen: Respiratory from Sputum, Expectorated     Culture, Respiratory with Gram Stain [[id number]]     Order Status: No result Specimen: Respiratory           All pertinent labs from the last 24 hours have been reviewed.    Significant Diagnostics:  US Lower Extremity Veins Bilateral    Result Date: 5/11/2022  No evidence of deep venous thrombosis in either lower extremity. Electronically signed by resident: Farshad Lorenzo Date:    05/11/2022 Time:    10:25 Electronically signed by: Donovan Marie MD Date:    05/11/2022 Time:    10:31

## 2022-05-11 NOTE — ED NOTES
Anesthesia e-consent signed and witnessed   May 23, 2022       Pantera Lynch  Via In Basket      Patient: Trixie Mccabe   YOB: 1961   Date of Visit: 5/11/2022       Dear Dr. Sha Driscoll:    I saw your patient, Kemi Gómez, for an evaluation. Below are my notes for this visit with him. If you have questions, please do not hesitate to call me. Sincerely,        Allayne Halsted, MD        CC: No Recipients  Allayne Halsted, MD  5/23/2022  6:55 AM  Signed  The patient is here to follow up. The patient is status post Right internal carotid artery endarterectomy. The patient is doing very well. No new problems. No fevers, chills, runny nose, diarrhea or constipation. No abdominal pain, dysuria or urgency. No new neurological deficit. Physical Examination: Awake, alert, oriented times 3. Visit Vitals  /84 (BP Location: LUE - Left upper extremity, Patient Position: Sitting, Cuff Size: Regular)   Pulse 71   Ht 6' (1.829 m)   Wt 76.5 kg (168 lb 10.4 oz)   SpO2 96%   BMI 22.87 kg/mÂ²     Neck:  Supple, incision well healed. No hematoma. Cranial nerves are intact from 2nd to 12. Good range of motion of bilateral upper and lower extremities. Ultrasound:  No significant stenosis   Assessment and Plan: Status post Right internal carotid artery endarterectomy. Doing very well. The patient is instructed to continue aspirin 325 mg by mouth every day and follow up in 6 months with bilateral carotid ultrasound.

## 2022-05-11 NOTE — ASSESSMENT & PLAN NOTE
80 y/o male with APS on coumadin, hx PE, MAC pneumonia, HLD with spontaneous C3-C6 epidural hematoma compressing the spinal cord    S/p  C3-C6 laminectomy and fusion for evacuation of epidural hematoma 4/28     Stepped down to NPU    -- CTH 4/29 no acute intracranial process   -- Post op MRI C spine satisfactory decompression with continued cord signal edema   --postop XR C spine hardware in good position  -- Acute encephalopathy:    --CTH 5/3 ordered per medicine team for some confusion: without evidence of acute hemorrhage.    --Hypernatremia: Na WNL. continue to monitor daily  --Agitation:    -Minimize use of restraints    -Scheduled 25 mg Seroquel qhs. Monitor QTc. Daily EKG. PRN Seroquel. Discontinue depakote for elevated LFTs   -MRI brain w/wo contrast: no acute infarct or hemorrhage.    -Agitation most likely 2/2 delirium and steroids, steroid taper in to be completed 5/13  -- Bilateral hydroureteronephrosis: Catheter in place. Keep in place until Urology follow up, 2 week follow up with Urology scheduled   -- C-collar to be worn when OOB  -- HV drains removed 4/30  -- History of antiphospholipid syndrome and PE: hold warfarin. Heme/onc signed off.    -Plan for outpatient follow up 5/27  -- SLP recommending regular diet with nectar thick liquids.  -- Dex taper to off. PPI while on steroids  -- Constipation: Bowel movement s/p enema on 5/3  -- Activity: PT/OT recs for SNF  -- DVT prophylaxis: TEDs/SCDs/SQH. - BLE US 5/11/22 negative for DVT  --Concern for hematoma vs abscess. Final path returned as blood clot. Daptomycin discontinued per ID recs.    - Specimen sent for cultures 5/6. CTM.    Dispo: Denied IPR, pending SNF

## 2022-05-11 NOTE — PLAN OF CARE
Problem: SLP  Goal: SLP Goal  Description: Speech Language Pathology Goals  Additional goals to be met by 5/16:   1. Pt will tolerate mechanical soft diet with thin liquids without overt s/sx airway threat.  2. Pt will Ox4 IND.  3. Pt will answer responsive naming questions with 90% acc given min cues.  4. Pt will complete short term recall tasks with min cues.   5. Pt will complete generative naming tasks with min cues.   6. Pt will complete basic reasoning tasks with min cues.  Outcome: Ongoing, Progressing  SLP rec: continue mechanical soft diet with thin liquids (no straws). Pt must adhere to small bites/sips and double swallow. Continue ST per POC.  5/11/2022

## 2022-05-11 NOTE — PT/OT/SLP PROGRESS
Speech Language Pathology Treatment    Patient Name:  Diego Gunter   MRN:  [id number]  Admitting Diagnosis: Epidural hematoma    Recommendations:                 General Recommendations:  Dysphagia therapy, Speech/language therapy and Cognitive-linguistic therapy  Diet recommendations:  Mechanical soft, Liquid Diet Level: Thin (no straws)   Aspiration Precautions: 1 bite/sip at a time, Assistance with meals, Eliminate distractions, Feed only when awake/alert, Frequent oral care, HOB to 90 degrees, Monitor for s/s of aspiration, Remain upright 30 minutes post meal, Small bites/sips and Standard aspiration precautions   General Precautions: Standard, fall, aspiration  Communication strategies:  provide increased time to answer and go to room if call light pushed    Subjective     SLP communicated with RN prior to entry and received clearance for therapy this date.   Pt awake and alert upon ST entry. Spouse at bedside. Pt agreeable to participate with ST.    Pain/Comfort:  Pain Rating 1: 0/10  Pain Rating Post-Intervention 1: 0/10    Respiratory Status: Room air    Objective:     Has the patient been evaluated by SLP for swallowing?   Yes  Keep patient NPO? No      Pt seen bedside for ongoing ST. Pt and spouse endorsed good tolerance of current recs though spouse reported dry breakfast meat causing coughing during meal. Pt accepted x2 straw sips water, demonstrating coughing following these trials. SLP offered cup-edge sips water x5 to which no overt s/s aspiration or airway compromise observed. Pt demonstrates tendency to take large straw sips therefore discontinuing straw use may be safest recommendation at this time. Pt continues to demonstrate good tolerance of mechanical soft solids and intermittent implementation of double swallow requiring min cues.   Pt was unable to recall swallowing exercises reviewed on previous service date. SLP offered ongoing education and prompted completion of effortful swallow and  sierra maneuver exercises x3 each provided mod cues. Pt demonstrates gaze preference to L, with difficulty tracking SLP from L>R despite max cues. Pt completed automatic speech tasks counting 1-10 and listing the IVONNE with 100% acc. Pt participated in confrontation naming of objects found within room with 75% acc given mod semantic and phonemic cues. Pt demonstrated frequent verbal perseverations throughout session. Pt participated in word deduction generative naming task with 80% acc given mod cues. Pt required increasing cues to remain in wakeful state, though ultimately session ended 2/2 decreased MORGAN.    SLP offered ongoing education to spouse re: importance of safe swallow precautions, role of SLP, risk factors for aspiration, current po rec, safe swallow precautions, freq/reps of dysphagia exercises, and POC moving forward to which spouse verbalized understanding. Continue ST per POC.    Assessment:     Diego Gunter is a 81 y.o. male with an SLP diagnosis of Aphasia and Cognitive-Linguistic Impairment.      Goals:   Multidisciplinary Problems     SLP Goals        Problem: SLP    Goal Priority Disciplines Outcome   SLP Goal    Low SLP Ongoing, Progressing   Description: Speech Language Pathology Goals  Additional goals to be met by 5/16:   1. Pt will tolerate mechanical soft diet with thin liquids without overt s/sx airway threat.  2. Pt will Ox4 IND.  3. Pt will answer responsive naming questions with 90% acc given min cues.  4. Pt will complete short term recall tasks with min cues.   5. Pt will complete generative naming tasks with min cues.   6. Pt will complete basic reasoning tasks with min cues.                     Plan:     · Patient to be seen:  4 x/week   · Plan of Care reviewed with:  patient, spouse   · SLP Follow-Up:  Yes       Discharge recommendations:  nursing facility, skilled   Barriers to Discharge:  Level of Skilled Assistance Needed .    Time Tracking:     SLP Treatment Date:    05/11/22  Speech Start Time:  0838  Speech Stop Time:  0910     Speech Total Time (min):  32 min    Billable Minutes: Speech Therapy Individual 12, Treatment Swallowing Dysfunction 10 and Self Care/Home Management Training 10  05/11/2022

## 2022-05-11 NOTE — PROGRESS NOTES
"[facility] ([facility])  [facility]  Progress Note    Patient Name: Diego Gunter  MRN: [id number]  Patient Class: IP- Inpatient   Admission Date: 4/28/2022  Length of Stay: 13 days  Attending Physician: Carlos Miller MD  Primary Care Provider: Portia Ferreira MD        Subjective:     Principal Problem:Epidural hematoma        HPI:  Mr. Gunter is a 81 year-old male with antiphospholipid syndrome (diagnosed 11/2021, on warfarin), PE, HLD, MAC (diagnosed in 2018, on chronic ethambutol and azithromycin), previous prostate ca (diagnosed in 2011, s/p prostatectomy) who presented to the hospital on 04/28 with R-sided weakness. Stroke workup was negative, but imaging was consistent with a compressive epidural hematoma ni the cervical spine C3-C6. He underwent spinal decompression and fluid evacuation with NSGY on 04/28. Since admitted, the Hematology/Oncology service was consulted- recommended not resuming therapeutic anticoagulation for APLS given current bleeding event and starting ppx anticoagulation when deemed safe by nsgy. ID was also consulted for recommendations regarding whether the epidural fluid collection could be an infectious collection- recommended starting dapto until path returned. Hospital course has been complicated by shock with intermittent pressor requirements (off since 05/01) and waxing/waning encephalopathy. He was noted to have acute changes in mental status around 05/01 which prompted a workup- EEG was obtained without epileptiform changes but with evidence of diffuse background slowing. Medicine consulted for "medical management; APS previously on coumadin; post op delirium".      Overview/Hospital Course:  HM consulted for encephalopathy likely secondary to steroids and hospital delirium in setting of likely diminished neurocognitive function. Started on Seroquel qhs. Initially improved yesterday and slept well; however, with mild agitation this morning. Weaning steroids " and working on rehab placement.       Interval History: please see above     Review of Systems   Unable to perform ROS: Mental status change   Objective:     Vital Signs (Most Recent):  Temp: 98.1 °F (36.7 °C) (05/11/22 1237)  Pulse: (!) 112 (05/11/22 1255)  Resp: 17 (05/11/22 1237)  BP: 113/64 (05/11/22 1237)  SpO2: 95 % (05/11/22 1237)   Vital Signs (24h Range):  Temp:  [97.1 °F (36.2 °C)-98.4 °F (36.9 °C)] 98.1 °F (36.7 °C)  Pulse:  [] 112  Resp:  [14-19] 17  SpO2:  [93 %-99 %] 95 %  BP: (113-123)/(57-69) 113/64     Weight: 74.8 kg (165 lb)  Body mass index is 24.37 kg/m².    Intake/Output Summary (Last 24 hours) at 5/11/2022 1414  Last data filed at 5/11/2022 1100  Gross per 24 hour   Intake 100 ml   Output 3230 ml   Net -3130 ml      Physical Exam  Constitutional:       General: He is not in acute distress.     Appearance: He is not ill-appearing or toxic-appearing.   HENT:      Head: Normocephalic and atraumatic.      Nose: No congestion or rhinorrhea.      Mouth/Throat:      Mouth: Mucous membranes are dry.      Pharynx: Oropharynx is clear.   Eyes:      General: No scleral icterus.     Extraocular Movements: Extraocular movements intact.      Conjunctiva/sclera: Conjunctivae normal.   Cardiovascular:      Rate and Rhythm: Normal rate and regular rhythm.      Pulses: Normal pulses.      Heart sounds: Normal heart sounds.   Pulmonary:      Effort: Pulmonary effort is normal.      Breath sounds: Normal breath sounds. No wheezing.   Abdominal:      General: Abdomen is flat. Bowel sounds are normal. There is no distension.      Palpations: Abdomen is soft.      Tenderness: There is no abdominal tenderness. There is no guarding or rebound.   Musculoskeletal:         General: No swelling.      Right lower leg: No edema.      Left lower leg: No edema.   Skin:     General: Skin is warm.      Coloration: Skin is not jaundiced.   Neurological:      General: No focal deficit present.      Mental Status: He is  alert.      Comments: Intermittent agitation and confusion, oriented to person. Mumbled speech this AM        Significant Labs: All pertinent labs within the past 24 hours have been reviewed.  BMP:   Recent Labs   Lab 05/11/22  0717 05/11/22  0849   *  --    * 148*   K 4.6  --    *  --    CO2 26  --    BUN 15  --    CREATININE 0.8  --    CALCIUM 9.4  --    MG 2.2  --      CBC:   Recent Labs   Lab 05/10/22  0827 05/11/22  0717   WBC 18.56* 18.43*   HGB 11.8* 12.1*   HCT 36.6* 37.2*    304       Significant Imaging: I have reviewed all pertinent imaging results/findings within the past 24 hours.      Assessment/Plan:     Acute encephalopathy  Mild cognitive impairment  --continue multimodal pain regimen and avoid sedatives as allowed  --follow infectious workup - NGTD  --continue daily miralax and senna, PRN lactulose  --urinary retention with bilateral hydroureteronephrosis, urology following. Catheter in place.  --restart seroquel as patient with combative behavior overnight   --delirium precautions  -- MRI brain w/wo contrast without acute process. Chronic microvascular changes  -- Folic acid, thiamine and multivitamin  -- D/c depakote. LFT's increased after administration, now improving  -- Continue Seroquel 50 mg QHS. Sleeping better on this regimen and encephalopathy improving. Avoid naps during day  -- Delirium precautions  -- Steroid taper       * Epidural hematoma  --continue management per primary      Hypernatremia  Secondary to dehydration  Improved. Encourage orals      Impaired mobility  PT/OT following- recs rehab       History of pulmonary embolism  --hold therapeutic anticoagulation at this time, per Heme/Onc recs, given recent bleed      Dysphagia  SLP following      HILLARY (mycobacterium avium-intracellulare)  --continue ethambutol and azithromycin      Hyperlipidemia associated with type 2 diabetes mellitus  --hold statin given LFTs. Defer to resuming outpatient for long term  benefit              VTE Risk Mitigation (From admission, onward)         Ordered     heparin (porcine) injection 5,000 Units  Every 8 hours         04/29/22 1704     Reason for No Pharmacological VTE Prophylaxis  Once        Question:  Reasons:  Answer:  Risk of Bleeding    04/28/22 2058     IP VTE HIGH RISK PATIENT  Once         04/28/22 2058     Place sequential compression device  Until discontinued         04/28/22 2058                          Niraj Khan MD  Department of Hospital Medicine   [facility] - Neurosurgery ([facility])

## 2022-05-12 LAB
ALBUMIN SERPL BCP-MCNC: 3.4 G/DL (ref 3.5–5.2)
ALP SERPL-CCNC: 93 U/L (ref 55–135)
ALT SERPL W/O P-5'-P-CCNC: 70 U/L (ref 10–44)
ANION GAP SERPL CALC-SCNC: 10 MMOL/L (ref 8–16)
APTT BLDCRRT: 31 SEC (ref 21–32)
AST SERPL-CCNC: 27 U/L (ref 10–40)
BASOPHILS # BLD AUTO: 0.04 K/UL (ref 0–0.2)
BASOPHILS NFR BLD: 0.3 % (ref 0–1.9)
BILIRUB SERPL-MCNC: 0.5 MG/DL (ref 0.1–1)
BUN SERPL-MCNC: 16 MG/DL (ref 8–23)
CALCIUM SERPL-MCNC: 9.3 MG/DL (ref 8.7–10.5)
CHLORIDE SERPL-SCNC: 107 MMOL/L (ref 95–110)
CO2 SERPL-SCNC: 25 MMOL/L (ref 23–29)
CREAT SERPL-MCNC: 0.8 MG/DL (ref 0.5–1.4)
DIFFERENTIAL METHOD: ABNORMAL
EOSINOPHIL # BLD AUTO: 0 K/UL (ref 0–0.5)
EOSINOPHIL NFR BLD: 0.2 % (ref 0–8)
ERYTHROCYTE [DISTWIDTH] IN BLOOD BY AUTOMATED COUNT: 18.6 % (ref 11.5–14.5)
EST. GFR  (AFRICAN AMERICAN): >60 ML/MIN/1.73 M^2
EST. GFR  (NON AFRICAN AMERICAN): >60 ML/MIN/1.73 M^2
GLUCOSE SERPL-MCNC: 132 MG/DL (ref 70–110)
HCT VFR BLD AUTO: 38.1 % (ref 40–54)
HGB BLD-MCNC: 11.8 G/DL (ref 14–18)
IMM GRANULOCYTES # BLD AUTO: 0.32 K/UL (ref 0–0.04)
IMM GRANULOCYTES NFR BLD AUTO: 2.1 % (ref 0–0.5)
INR PPP: 1 (ref 0.8–1.2)
LYMPHOCYTES # BLD AUTO: 2.5 K/UL (ref 1–4.8)
LYMPHOCYTES NFR BLD: 16 % (ref 18–48)
MAGNESIUM SERPL-MCNC: 2.1 MG/DL (ref 1.6–2.6)
MCH RBC QN AUTO: 28.1 PG (ref 27–31)
MCHC RBC AUTO-ENTMCNC: 31 G/DL (ref 32–36)
MCV RBC AUTO: 91 FL (ref 82–98)
MONOCYTES # BLD AUTO: 1.3 K/UL (ref 0.3–1)
MONOCYTES NFR BLD: 8.4 % (ref 4–15)
NEUTROPHILS # BLD AUTO: 11.3 K/UL (ref 1.8–7.7)
NEUTROPHILS NFR BLD: 73 % (ref 38–73)
NRBC BLD-RTO: 0 /100 WBC
PHOSPHATE SERPL-MCNC: 3.1 MG/DL (ref 2.7–4.5)
PLATELET # BLD AUTO: 332 K/UL (ref 150–450)
PMV BLD AUTO: 10.9 FL (ref 9.2–12.9)
POCT GLUCOSE: 145 MG/DL (ref 70–110)
POCT GLUCOSE: 149 MG/DL (ref 70–110)
POCT GLUCOSE: 162 MG/DL (ref 70–110)
POCT GLUCOSE: 182 MG/DL (ref 70–110)
POCT GLUCOSE: 92 MG/DL (ref 70–110)
POTASSIUM SERPL-SCNC: 4.2 MMOL/L (ref 3.5–5.1)
PROT SERPL-MCNC: 7 G/DL (ref 6–8.4)
PROTHROMBIN TIME: 10.3 SEC (ref 9–12.5)
RBC # BLD AUTO: 4.2 M/UL (ref 4.6–6.2)
SARS-COV-2 RNA RESP QL NAA+PROBE: NOT DETECTED
SODIUM SERPL-SCNC: 138 MMOL/L (ref 136–145)
SODIUM SERPL-SCNC: 142 MMOL/L (ref 136–145)
SODIUM SERPL-SCNC: 144 MMOL/L (ref 136–145)
WBC # BLD AUTO: 15.46 K/UL (ref 3.9–12.7)

## 2022-05-12 PROCEDURE — 25000003 PHARM REV CODE 250: Performed by: STUDENT IN AN ORGANIZED HEALTH CARE EDUCATION/TRAINING PROGRAM

## 2022-05-12 PROCEDURE — 25000242 PHARM REV CODE 250 ALT 637 W/ HCPCS: Performed by: STUDENT IN AN ORGANIZED HEALTH CARE EDUCATION/TRAINING PROGRAM

## 2022-05-12 PROCEDURE — 93005 ELECTROCARDIOGRAM TRACING: CPT

## 2022-05-12 PROCEDURE — 85730 THROMBOPLASTIN TIME PARTIAL: CPT | Performed by: PHYSICIAN ASSISTANT

## 2022-05-12 PROCEDURE — 27000646 HC AEROBIKA DEVICE

## 2022-05-12 PROCEDURE — 85610 PROTHROMBIN TIME: CPT | Performed by: PHYSICIAN ASSISTANT

## 2022-05-12 PROCEDURE — 25000003 PHARM REV CODE 250: Performed by: PHYSICIAN ASSISTANT

## 2022-05-12 PROCEDURE — 84295 ASSAY OF SERUM SODIUM: CPT | Mod: 91

## 2022-05-12 PROCEDURE — 85025 COMPLETE CBC W/AUTO DIFF WBC: CPT | Performed by: STUDENT IN AN ORGANIZED HEALTH CARE EDUCATION/TRAINING PROGRAM

## 2022-05-12 PROCEDURE — 63600175 PHARM REV CODE 636 W HCPCS: Performed by: STUDENT IN AN ORGANIZED HEALTH CARE EDUCATION/TRAINING PROGRAM

## 2022-05-12 PROCEDURE — 84100 ASSAY OF PHOSPHORUS: CPT | Performed by: STUDENT IN AN ORGANIZED HEALTH CARE EDUCATION/TRAINING PROGRAM

## 2022-05-12 PROCEDURE — 25000003 PHARM REV CODE 250

## 2022-05-12 PROCEDURE — 94640 AIRWAY INHALATION TREATMENT: CPT

## 2022-05-12 PROCEDURE — 93010 EKG 12-LEAD: ICD-10-PCS | Mod: ,,, | Performed by: INTERNAL MEDICINE

## 2022-05-12 PROCEDURE — 63700000 PHARM REV CODE 250 ALT 637 W/O HCPCS: Performed by: STUDENT IN AN ORGANIZED HEALTH CARE EDUCATION/TRAINING PROGRAM

## 2022-05-12 PROCEDURE — 93010 ELECTROCARDIOGRAM REPORT: CPT | Mod: ,,, | Performed by: INTERNAL MEDICINE

## 2022-05-12 PROCEDURE — 36415 COLL VENOUS BLD VENIPUNCTURE: CPT | Performed by: STUDENT IN AN ORGANIZED HEALTH CARE EDUCATION/TRAINING PROGRAM

## 2022-05-12 PROCEDURE — U0003 INFECTIOUS AGENT DETECTION BY NUCLEIC ACID (DNA OR RNA); SEVERE ACUTE RESPIRATORY SYNDROME CORONAVIRUS 2 (SARS-COV-2) (CORONAVIRUS DISEASE [COVID-19]), AMPLIFIED PROBE TECHNIQUE, MAKING USE OF HIGH THROUGHPUT TECHNOLOGIES AS DESCRIBED BY CMS-2020-01-R: HCPCS | Performed by: NEUROLOGICAL SURGERY

## 2022-05-12 PROCEDURE — 11000001 HC ACUTE MED/SURG PRIVATE ROOM

## 2022-05-12 PROCEDURE — 83735 ASSAY OF MAGNESIUM: CPT | Performed by: STUDENT IN AN ORGANIZED HEALTH CARE EDUCATION/TRAINING PROGRAM

## 2022-05-12 PROCEDURE — 63600175 PHARM REV CODE 636 W HCPCS: Performed by: PHYSICIAN ASSISTANT

## 2022-05-12 PROCEDURE — 99232 SBSQ HOSP IP/OBS MODERATE 35: CPT | Mod: GC,,, | Performed by: HOSPITALIST

## 2022-05-12 PROCEDURE — 94761 N-INVAS EAR/PLS OXIMETRY MLT: CPT

## 2022-05-12 PROCEDURE — 25000003 PHARM REV CODE 250: Performed by: HOSPITALIST

## 2022-05-12 PROCEDURE — 99232 PR SUBSEQUENT HOSPITAL CARE,LEVL II: ICD-10-PCS | Mod: GC,,, | Performed by: HOSPITALIST

## 2022-05-12 PROCEDURE — 99900035 HC TECH TIME PER 15 MIN (STAT)

## 2022-05-12 PROCEDURE — 80053 COMPREHEN METABOLIC PANEL: CPT | Performed by: STUDENT IN AN ORGANIZED HEALTH CARE EDUCATION/TRAINING PROGRAM

## 2022-05-12 PROCEDURE — 94668 MNPJ CHEST WALL SBSQ: CPT

## 2022-05-12 PROCEDURE — U0005 INFEC AGEN DETEC AMPLI PROBE: HCPCS | Performed by: NEUROLOGICAL SURGERY

## 2022-05-12 PROCEDURE — 94664 DEMO&/EVAL PT USE INHALER: CPT

## 2022-05-12 RX ADMIN — Medication 9 MG: at 08:05

## 2022-05-12 RX ADMIN — FOLIC ACID 1 MG: 1 TABLET ORAL at 09:05

## 2022-05-12 RX ADMIN — QUETIAPINE FUMARATE 50 MG: 25 TABLET ORAL at 08:05

## 2022-05-12 RX ADMIN — FAMOTIDINE 20 MG: 20 TABLET ORAL at 09:05

## 2022-05-12 RX ADMIN — SILODOSIN 4 MG: 4 CAPSULE ORAL at 09:05

## 2022-05-12 RX ADMIN — THERA TABS 1 TABLET: TAB at 09:05

## 2022-05-12 RX ADMIN — SENNOSIDES 8.6 MG: 8.6 TABLET ORAL at 08:05

## 2022-05-12 RX ADMIN — SODIUM CHLORIDE SOLN NEBU 3% 4 ML: 3 NEBU SOLN at 09:05

## 2022-05-12 RX ADMIN — HEPARIN SODIUM 5000 UNITS: 5000 INJECTION INTRAVENOUS; SUBCUTANEOUS at 10:05

## 2022-05-12 RX ADMIN — SODIUM CHLORIDE SOLN NEBU 3% 4 ML: 3 NEBU SOLN at 07:05

## 2022-05-12 RX ADMIN — ACETAMINOPHEN 1000 MG: 500 TABLET ORAL at 09:05

## 2022-05-12 RX ADMIN — DEXAMETHASONE 2 MG: 1 TABLET ORAL at 09:05

## 2022-05-12 RX ADMIN — HEPARIN SODIUM 5000 UNITS: 5000 INJECTION INTRAVENOUS; SUBCUTANEOUS at 02:05

## 2022-05-12 RX ADMIN — IPRATROPIUM BROMIDE AND ALBUTEROL SULFATE 3 ML: 2.5; .5 SOLUTION RESPIRATORY (INHALATION) at 12:05

## 2022-05-12 RX ADMIN — AZITHROMYCIN MONOHYDRATE 500 MG: 250 TABLET ORAL at 09:05

## 2022-05-12 RX ADMIN — THIAMINE HCL TAB 100 MG 100 MG: 100 TAB at 09:05

## 2022-05-12 RX ADMIN — IPRATROPIUM BROMIDE AND ALBUTEROL SULFATE 3 ML: 2.5; .5 SOLUTION RESPIRATORY (INHALATION) at 07:05

## 2022-05-12 RX ADMIN — ACETAMINOPHEN 1000 MG: 500 TABLET ORAL at 08:05

## 2022-05-12 RX ADMIN — HEPARIN SODIUM 5000 UNITS: 5000 INJECTION INTRAVENOUS; SUBCUTANEOUS at 06:05

## 2022-05-12 RX ADMIN — FAMOTIDINE 20 MG: 20 TABLET ORAL at 08:05

## 2022-05-12 RX ADMIN — IPRATROPIUM BROMIDE AND ALBUTEROL SULFATE 3 ML: 2.5; .5 SOLUTION RESPIRATORY (INHALATION) at 01:05

## 2022-05-12 RX ADMIN — ETHAMBUTOL HYDROCHLORIDE 800 MG: 400 TABLET, FILM COATED ORAL at 09:05

## 2022-05-12 NOTE — PLAN OF CARE
Problem: Adult Inpatient Plan of Care  Goal: Plan of Care Review  Outcome: Ongoing, Progressing     Problem: Adjustment to Illness (Stroke, Ischemic/Transient Ischemic Attack)  Goal: Optimal Coping  Outcome: Ongoing, Progressing     Problem: Fall Injury Risk  Goal: Absence of Fall and Fall-Related Injury  Outcome: Ongoing, Progressing     Problem: Infection  Goal: Absence of Infection Signs and Symptoms  Outcome: Ongoing, Progressing     Problem: Diabetes Comorbidity  Goal: Blood Glucose Level Within Targeted Range  Outcome: Ongoing, Progressing     Problem: Infection  Goal: Absence of Infection Signs and Symptoms  Outcome: Ongoing, Progressing    Plan of care reviewed with the patient and his wife. The patient's wife verbalized her understanding but the patient is confused and has tp be redirected and reoriented. No s/s of any respiratory distress noted. No s/s of hypo/hyperglycemia noted. The patient had a bowel movement and incontinent care was provided. The telesitter is in use. No voiced complaints or s/s of any pain or discomfort noted. The amaya is patent and draining yellow colored urine to the BSDB. Questions and concerns were addressed. The siderails are upX 3, the bed is locked and in it's lowest position, the bed alarm is on, and the call light is within reach Safety, Aspiration and Fall precautions are being maintained No apparent distress noted. Will continue to monitor and report any changes

## 2022-05-12 NOTE — PLAN OF CARE
05/12/22 1050   Post-Acute Status   Post-Acute Authorization Placement   Post-Acute Placement Status Pending payor review/awaiting authorization (if required)   Discharge Delays None known at this time   Discharge Plan   Discharge Plan A Skilled Nursing Facility   Discharge Plan B Home Health     SW met with pt/family at bedside. They reported that [facility] is their choice for SNF.    ROLAND faxed over SNF auth request to [facility] and spoke to Connie in admissions who reported that facility could accept pt on 5/13. ROLAND ordered a COVID test for pt.     2:15 PM  SW sent COVID result via [employer] and followed up on SNF auth with Yanira at [facility] ([phone]) who reported that she would have auth sent to [facility].     ROLAND will continue to coordinate with patient, family, team and insurance to complete patient's discharge plan.    Irma Saravia, CHRISTEL  [phone]

## 2022-05-12 NOTE — SUBJECTIVE & OBJECTIVE
Interval History: slept well last night and mentation much improved this AM    Review of Systems   Constitutional:  Positive for fatigue. Negative for activity change, appetite change, chills and fever.   HENT:  Negative for congestion, rhinorrhea and sore throat.    Respiratory:  Negative for cough, shortness of breath and wheezing.    Cardiovascular:  Negative for chest pain, palpitations and leg swelling.   Gastrointestinal:  Negative for abdominal pain, constipation, diarrhea, nausea and vomiting.   Genitourinary:  Negative for difficulty urinating, dysuria and hematuria.   Musculoskeletal:  Negative for back pain, myalgias and neck pain.   Skin:  Negative for rash.   Neurological:  Positive for weakness. Negative for headaches.   Psychiatric/Behavioral:  Negative for agitation and confusion.    Objective:     Vital Signs (Most Recent):  Temp: 97.4 °F (36.3 °C) (05/12/22 1156)  Pulse: (!) 58 (05/12/22 1241)  Resp: 16 (05/12/22 1241)  BP: 103/60 (05/12/22 1156)  SpO2: (!) 93 % (05/12/22 1241)   Vital Signs (24h Range):  Temp:  [97.4 °F (36.3 °C)-98.5 °F (36.9 °C)] 97.4 °F (36.3 °C)  Pulse:  [56-93] 58  Resp:  [16-18] 16  SpO2:  [93 %-97 %] 93 %  BP: (103-119)/(58-65) 103/60     Weight: 74.8 kg (165 lb)  Body mass index is 24.37 kg/m².    Intake/Output Summary (Last 24 hours) at 5/12/2022 1305  Last data filed at 5/12/2022 0900  Gross per 24 hour   Intake 240 ml   Output 4200 ml   Net -3960 ml      Physical Exam  Constitutional:       General: He is not in acute distress.     Appearance: He is not ill-appearing or toxic-appearing.   HENT:      Head: Normocephalic and atraumatic.      Nose: No congestion or rhinorrhea.      Mouth/Throat:      Mouth: Mucous membranes are dry.      Pharynx: Oropharynx is clear.   Eyes:      General: No scleral icterus.     Extraocular Movements: Extraocular movements intact.      Conjunctiva/sclera: Conjunctivae normal.   Cardiovascular:      Rate and Rhythm: Normal rate and regular  rhythm.      Pulses: Normal pulses.      Heart sounds: Normal heart sounds.   Pulmonary:      Effort: Pulmonary effort is normal.      Breath sounds: Normal breath sounds. No wheezing.   Abdominal:      General: Abdomen is flat. Bowel sounds are normal. There is no distension.      Palpations: Abdomen is soft.      Tenderness: There is no abdominal tenderness. There is no guarding or rebound.   Musculoskeletal:         General: No swelling.      Right lower leg: No edema.      Left lower leg: No edema.   Skin:     General: Skin is warm.      Coloration: Skin is not jaundiced.   Neurological:      General: No focal deficit present.      Mental Status: He is alert.      Comments: Oriented this AM, speech clear       Significant Labs: All pertinent labs within the past 24 hours have been reviewed.  CBC:   Recent Labs   Lab 05/11/22  0717 05/12/22  0804   WBC 18.43* 15.46*   HGB 12.1* 11.8*   HCT 37.2* 38.1*    332     CMP:   Recent Labs   Lab 05/11/22  0717 05/11/22  0849 05/11/22  1955 05/12/22  0804   * 148* 139 142  144   K 4.6  --   --  4.2   *  --   --  107   CO2 26  --   --  25   *  --   --  132*   BUN 15  --   --  16   CREATININE 0.8  --   --  0.8   CALCIUM 9.4  --   --  9.3   PROT 6.6  --   --  7.0   ALBUMIN 3.6  --   --  3.4*   BILITOT 0.5  --   --  0.5   ALKPHOS 111  --   --  93   AST 45*  --   --  27   ALT 90*  --   --  70*   ANIONGAP 8  --   --  10   EGFRNONAA >60.0  --   --  >60.0       Significant Imaging: I have reviewed all pertinent imaging results/findings within the past 24 hours.

## 2022-05-12 NOTE — SUBJECTIVE & OBJECTIVE
Interval History: NAEON. Patient rested well overnight, AAOx2. Neuro exam stable. Na 144. Pending SNF    Medications:  Continuous Infusions:  Scheduled Meds:   acetaminophen  1,000 mg Oral TID    albuterol-ipratropium  3 mL Nebulization Q6H    azithromycin  500 mg Oral Daily    [START ON 5/13/2022] dexAMETHasone  2 mg Oral Daily    ethambutoL  800 mg Oral Daily    famotidine  20 mg Oral BID    folic acid  1 mg Oral Daily    heparin (porcine)  5,000 Units Subcutaneous Q8H    LIDOcaine  1 patch Transdermal Q24H    melatonin  9 mg Oral Nightly    multivitamin  1 tablet Oral Daily    polyethylene glycol  17 g Oral Daily    QUEtiapine  50 mg Oral QHS    senna  8.6 mg Oral BID    silodosin  4 mg Oral Daily    sodium chloride 3%  4 mL Nebulization BID    thiamine  100 mg Oral Daily     PRN Meds:acetaminophen, dextrose 10%, dextrose 10%, glucagon (human recombinant), insulin aspart U-100, lactulose, ondansetron, oxyCODONE, QUEtiapine     Review of Systems  Objective:     Weight: 74.8 kg (165 lb)  Body mass index is 24.37 kg/m².  Vital Signs (Most Recent):  Temp: 98.5 °F (36.9 °C) (05/12/22 0904)  Pulse: (!) 56 (05/12/22 1107)  Resp: 16 (05/12/22 0736)  BP: 107/63 (05/12/22 0904)  SpO2: 95 % (05/12/22 0904)   Vital Signs (24h Range):  Temp:  [97.7 °F (36.5 °C)-98.5 °F (36.9 °C)] 98.5 °F (36.9 °C)  Pulse:  [] 56  Resp:  [16-19] 16  SpO2:  [93 %-99 %] 95 %  BP: (105-119)/(58-65) 107/63     Date 05/12/22 0700 - 05/13/22 0659   Shift 2851-9711 8947-6933 6380-7390 24 Hour Total   INTAKE   Shift Total(mL/kg)       OUTPUT   Urine(mL/kg/hr) 1000   1000   Shift Total(mL/kg) 1000(13.4)   1000(13.4)   Weight (kg) 74.8 74.8 74.8 74.8              Oxygen Concentration (%):  [21] 21         Urethral Catheter 05/05/22 0200 Straight-tip;Non-latex (Active)   Site Assessment Clean;Intact;Dry 05/12/22 1107   Collection Container Standard drainage bag 05/12/22 1107   Securement Method secured to top of thigh w/ adhesive device 05/12/22  "1107   Catheter Care Performed yes 05/12/22 1107   Reason for Continuing Urinary Catheterization Urinary retention 05/12/22 1107   CAUTI Prevention Bundle Securement Device in place with 1" slack;Intact seal between catheter & drainage tubing;Drainage bag/urimeter off the floor;Sheeting clip in use;No dependent loops or kinks;Drainage bag/urimeter not overfilled (<2/3 full);Drainage bag/urimeter below bladder 05/12/22 1107   Output (mL) 450 mL 05/10/22 1600       Physical Exam    Neurosurgery Physical Exam  General: well developed, well nourished, no distress.   Head: normocephalic, atraumatic  Neurologic: Alert and oriented. Thought content appropriate  Mental Status: Awake, Alert, Oriented to self and place, not year. Confusion present, however improved. Follows commands.   Language: No aphasia  Speech: No dysarthria  Cranial nerves: face symmetric,   Eyes: pupils equal, round, reactive to light with accommodation, EOMI.   Pulmonary: normal respirations, no signs of respiratory distress  Abdomen: soft,slightly distended  Skin: Skin is warm, dry and intact.  Sensory: intact to light touch throughout     Motor Strength:Moves all extremities spontaneously with good tone.  Full strength upper and lower extremities. Intermittent twitching noted to BUE/BLE   Strength   Deltoids Triceps Biceps Wrist Extension Wrist Flexion Hand    Upper: R 5/5 5/5 5/5 5/5 5/5 5/5     L 5/5 5/5 5/5 5/5 5/5 5/5       Iliopsoas Quadriceps Knee  Flexion Tibialis  anterior Gastro- cnemius EHL   Lower: R 5/5 5/5 5/5 5/5 5/5 5/5     L 5/5 5/5 5/5 5/5 5/5 5/5      Posterior cervical incision: Clean, dry, staples intact. No erythema, edema, or drainage.      Significant Labs:  Recent Labs   Lab 05/11/22  0717 05/11/22  0849 05/11/22  1955 05/12/22  0804   *  --   --  132*   * 148* 139 142  144   K 4.6  --   --  4.2   *  --   --  107   CO2 26  --   --  25   BUN 15  --   --  16   CREATININE 0.8  --   --  0.8   CALCIUM 9.4  " --   --  9.3   MG 2.2  --   --  2.1     Recent Labs   Lab 05/11/22  0717 05/12/22  0804   WBC 18.43* 15.46*   HGB 12.1* 11.8*   HCT 37.2* 38.1*    332     Recent Labs   Lab 05/11/22  0717 05/12/22  0804   INR 1.0 1.0   APTT 26.8 31.0     Microbiology Results (last 7 days)       Procedure Component Value Units Date/Time    Blood culture [[id number]] Collected: 05/03/22 1542    Order Status: Completed Specimen: Blood from Peripheral, Left Hand Updated: 05/08/22 1612     Blood Culture, Routine No growth after 5 days.    Narrative:      Collection has been rescheduled by 6 at 05/03/2022 12:38 Reason:   Patient unavailable going yo ct will yry back  Collection has been rescheduled by [facility] at 05/03/2022 12:41 Reason:   Spe with sabino stanton Patient unavailable going to ct  Collection has been rescheduled by 6 at 05/03/2022 14:03 Reason:   Patient unavailable in ultrasound sabino valenzuela said come back in an hr   Collection has been rescheduled by 6 at 05/03/2022 12:38 Reason:   Patient unavailable going yo ct will yry back  Collection has been rescheduled by 6 at 05/03/2022 12:41 Reason:   Spe with sabino stanton Patient unavailable going to ct  Collection has been rescheduled by 6 at 05/03/2022 14:03 Reason:   Patient unavailable in ultrasound sabino valenzuela said come back in an hr     Blood culture [[id number]] Collected: 05/03/22 1543    Order Status: Completed Specimen: Blood from Peripheral, Right Hand Updated: 05/08/22 1612     Blood Culture, Routine No growth after 5 days.    Narrative:      Collection has been rescheduled by 6 at 05/03/2022 12:38 Reason:   Patient unavailable going yo ct will yry back  Collection has been rescheduled by 6 at 05/03/2022 12:41 Reason:   Spe with sabino stanton Patient unavailable going to ct  Collection has been rescheduled by 6 at 05/03/2022 14:03 Reason:   Patient unavailable in ultrasound sabino valenzuela said come back in an hr   Collection has been rescheduled by 6 at 05/03/2022 12:38  Reason:   Patient unavailable going yo ct will yry back  Collection has been rescheduled by 6 at 05/03/2022 12:41 Reason:   Spe with rn romy Patient unavailable going to ct  Collection has been rescheduled by 6 at 05/03/2022 14:03 Reason:   Patient unavailable in ultrasound sabino valenzuela said come back in an hr     Culture, Anaerobe [[id number]]     Order Status: Completed Specimen: Neck     Aerobic culture [[id number]]     Order Status: Completed Specimen: Neck           All pertinent labs from the last 24 hours have been reviewed.    Significant Diagnostics:  No results found in the last 24 hours.

## 2022-05-12 NOTE — PLAN OF CARE
Plan of care discussed with pt. PT alert to self. No issues overnight. Pt well slept throughout after scheduled meds given. VS stable overnight, afebrile. Pt denies any pain. Repositioned w/ pillows.SCDs in place. Floyd in place draining yellow urine.  Pt educated on fall precautions. Bed alarm on and Tele-sitter camera in room. Call light in reach. Pt free of injuries this shift.  All questions addressed. Pt voices no concerns at this time. Safety and comfort measures maintained during hourly rounding.

## 2022-05-12 NOTE — PROGRESS NOTES
"[facility] - Neurosurgery ([facility])  [facility] Medicine  Progress Note    Patient Name: Diego Gunter  MRN: [id number]  Patient Class: IP- Inpatient   Admission Date: 4/28/2022  Length of Stay: 14 days  Attending Physician: Carlos Miller MD  Primary Care Provider: Portia Ferreira MD        Subjective:     Principal Problem:Epidural hematoma        HPI:  Mr. Gunter is a 81 year-old male with antiphospholipid syndrome (diagnosed 11/2021, on warfarin), PE, HLD, MAC (diagnosed in 2018, on chronic ethambutol and azithromycin), previous prostate ca (diagnosed in 2011, s/p prostatectomy) who presented to the hospital on 04/28 with R-sided weakness. Stroke workup was negative, but imaging was consistent with a compressive epidural hematoma ni the cervical spine C3-C6. He underwent spinal decompression and fluid evacuation with NSGY on 04/28. Since admitted, the Hematology/Oncology service was consulted- recommended not resuming therapeutic anticoagulation for APLS given current bleeding event and starting ppx anticoagulation when deemed safe by nsgy. ID was also consulted for recommendations regarding whether the epidural fluid collection could be an infectious collection- recommended starting dapto until path returned. Hospital course has been complicated by shock with intermittent pressor requirements (off since 05/01) and waxing/waning encephalopathy. He was noted to have acute changes in mental status around 05/01 which prompted a workup- EEG was obtained without epileptiform changes but with evidence of diffuse background slowing. Medicine consulted for "medical management; APS previously on coumadin; post op delirium".      Overview/Hospital Course:  HM consulted for encephalopathy likely secondary to steroids and hospital delirium in setting of likely diminished neurocognitive function. Started on Seroquel qhs. Initially improved yesterday and slept well; however, with mild agitation this morning. Weaning steroids " and working on rehab placement.       Interval History: slept well last night and mentation much improved this AM    Review of Systems   Constitutional:  Positive for fatigue. Negative for activity change, appetite change, chills and fever.   HENT:  Negative for congestion, rhinorrhea and sore throat.    Respiratory:  Negative for cough, shortness of breath and wheezing.    Cardiovascular:  Negative for chest pain, palpitations and leg swelling.   Gastrointestinal:  Negative for abdominal pain, constipation, diarrhea, nausea and vomiting.   Genitourinary:  Negative for difficulty urinating, dysuria and hematuria.   Musculoskeletal:  Negative for back pain, myalgias and neck pain.   Skin:  Negative for rash.   Neurological:  Positive for weakness. Negative for headaches.   Psychiatric/Behavioral:  Negative for agitation and confusion.    Objective:     Vital Signs (Most Recent):  Temp: 97.4 °F (36.3 °C) (05/12/22 1156)  Pulse: (!) 58 (05/12/22 1241)  Resp: 16 (05/12/22 1241)  BP: 103/60 (05/12/22 1156)  SpO2: (!) 93 % (05/12/22 1241)   Vital Signs (24h Range):  Temp:  [97.4 °F (36.3 °C)-98.5 °F (36.9 °C)] 97.4 °F (36.3 °C)  Pulse:  [56-93] 58  Resp:  [16-18] 16  SpO2:  [93 %-97 %] 93 %  BP: (103-119)/(58-65) 103/60     Weight: 74.8 kg (165 lb)  Body mass index is 24.37 kg/m².    Intake/Output Summary (Last 24 hours) at 5/12/2022 1305  Last data filed at 5/12/2022 0900  Gross per 24 hour   Intake 240 ml   Output 4200 ml   Net -3960 ml      Physical Exam  Constitutional:       General: He is not in acute distress.     Appearance: He is not ill-appearing or toxic-appearing.   HENT:      Head: Normocephalic and atraumatic.      Nose: No congestion or rhinorrhea.      Mouth/Throat:      Mouth: Mucous membranes are dry.      Pharynx: Oropharynx is clear.   Eyes:      General: No scleral icterus.     Extraocular Movements: Extraocular movements intact.      Conjunctiva/sclera: Conjunctivae normal.   Cardiovascular:       Rate and Rhythm: Normal rate and regular rhythm.      Pulses: Normal pulses.      Heart sounds: Normal heart sounds.   Pulmonary:      Effort: Pulmonary effort is normal.      Breath sounds: Normal breath sounds. No wheezing.   Abdominal:      General: Abdomen is flat. Bowel sounds are normal. There is no distension.      Palpations: Abdomen is soft.      Tenderness: There is no abdominal tenderness. There is no guarding or rebound.   Musculoskeletal:         General: No swelling.      Right lower leg: No edema.      Left lower leg: No edema.   Skin:     General: Skin is warm.      Coloration: Skin is not jaundiced.   Neurological:      General: No focal deficit present.      Mental Status: He is alert.      Comments: Oriented this AM, speech clear       Significant Labs: All pertinent labs within the past 24 hours have been reviewed.  CBC:   Recent Labs   Lab 05/11/22 0717 05/12/22  0804   WBC 18.43* 15.46*   HGB 12.1* 11.8*   HCT 37.2* 38.1*    332     CMP:   Recent Labs   Lab 05/11/22 0717 05/11/22  0849 05/11/22  1955 05/12/22  0804   * 148* 139 142  144   K 4.6  --   --  4.2   *  --   --  107   CO2 26  --   --  25   *  --   --  132*   BUN 15  --   --  16   CREATININE 0.8  --   --  0.8   CALCIUM 9.4  --   --  9.3   PROT 6.6  --   --  7.0   ALBUMIN 3.6  --   --  3.4*   BILITOT 0.5  --   --  0.5   ALKPHOS 111  --   --  93   AST 45*  --   --  27   ALT 90*  --   --  70*   ANIONGAP 8  --   --  10   EGFRNONAA >60.0  --   --  >60.0       Significant Imaging: I have reviewed all pertinent imaging results/findings within the past 24 hours.      Assessment/Plan:      Acute encephalopathy  Mild cognitive impairment  --continue multimodal pain regimen and avoid sedatives as allowed  --follow infectious workup - NGTD  --continue daily miralax and senna, PRN lactulose  --urinary retention with bilateral hydroureteronephrosis, urology following. Catheter in place.  --restart seroquel as patient  with combative behavior overnight   --delirium precautions  -- MRI brain w/wo contrast without acute process. Chronic microvascular changes  -- Folic acid, thiamine and multivitamin  -- D/c depakote. LFT's increased after administration, now improving  -- Continue Seroquel 50 mg QHS. Sleeping better on this regimen and encephalopathy improving. avoid daytime naps. Frequent reorientation, and blinds open during day   -- Delirium precautions  -- Steroid taper         * Epidural hematoma  --continue management per primary        Hypernatremia  Secondary to dehydration  Improved. Encourage orals        Impaired mobility  PT/OT following- recs rehab         History of pulmonary embolism  --hold therapeutic anticoagulation at this time, per Heme/Onc recs, given recent bleed        Dysphagia  SLP following        HILLARY (mycobacterium avium-intracellulare)  --continue ethambutol and azithromycin        Hyperlipidemia associated with type 2 diabetes mellitus  --hold statin given LFTs. Defer to resuming outpatient for long term benefit      VTE Risk Mitigation (From admission, onward)         Ordered     heparin (porcine) injection 5,000 Units  Every 8 hours         04/29/22 1704     Reason for No Pharmacological VTE Prophylaxis  Once        Question:  Reasons:  Answer:  Risk of Bleeding    04/28/22 2058     IP VTE HIGH RISK PATIENT  Once         04/28/22 2058     Place sequential compression device  Until discontinued         04/28/22 2058                    Niraj Khan MD  Department of Hospital Medicine   [facility] - Neurosurgery ([facility])

## 2022-05-12 NOTE — PLAN OF CARE
Problem: Adult Inpatient Plan of Care  Goal: Plan of Care Review  Outcome: Ongoing, Progressing     Problem: Adult Inpatient Plan of Care  Goal: Patient-Specific Goal (Individualized     Outcome: Ongoing, Progressing     Problem: Adult Inpatient Plan of Care  Goal: Absence of Hospital-Acquired Illness or Injury  Outcome: Ongoing, Progressing     Problem: Adult Inpatient Plan of Care  Goal: Optimal Comfort and Wellbeing  Outcome: Ongoing, Progressing     Problem: Fall Injury Risk  Goal: Absence of Fall and Fall-Related Injury  Outcome: Ongoing, Progressing     Problem: Infection  Goal: Absence of Infection Signs and Symptoms  Outcome: Ongoing, Progressing   Plan of care reviewed with the patient and his wife. The patient's wife verbalized her understanding but the patient is confused and has tp be redirected and reoriented. No s/s of any respiratory distress noted. No s/s of hypo/hyperglycemia noted. The patient had a bowel movement and incontinent care was provided. No voiced complaints or s/s of any pain or discomfort noted. The amaya is patent and draining yellow colored urine to the BSDB. Questions and concerns were addressed. The siderails are upX 3, the bed is locked and in it's lowest position, and the call light is within reach Safety, Aspiration and Fall precautions are being maintained.No apparent distress noted. Will continue to monitor and report any changes

## 2022-05-13 ENCOUNTER — TELEPHONE (OUTPATIENT)
Dept: NEUROSURGERY | Facility: CLINIC | Age: 82
End: 2022-05-13
Payer: MEDICARE

## 2022-05-13 VITALS
TEMPERATURE: 99 F | DIASTOLIC BLOOD PRESSURE: 69 MMHG | HEIGHT: 69 IN | OXYGEN SATURATION: 98 % | HEART RATE: 70 BPM | WEIGHT: 165 LBS | BODY MASS INDEX: 24.44 KG/M2 | RESPIRATION RATE: 18 BRPM | SYSTOLIC BLOOD PRESSURE: 111 MMHG

## 2022-05-13 LAB
ALBUMIN SERPL BCP-MCNC: 3.3 G/DL (ref 3.5–5.2)
ALP SERPL-CCNC: 98 U/L (ref 55–135)
ALT SERPL W/O P-5'-P-CCNC: 66 U/L (ref 10–44)
ANION GAP SERPL CALC-SCNC: 11 MMOL/L (ref 8–16)
APTT BLDCRRT: 37.1 SEC (ref 21–32)
APTT BLDCRRT: 43.9 SEC (ref 21–32)
AST SERPL-CCNC: 32 U/L (ref 10–40)
BASOPHILS # BLD AUTO: 0.06 K/UL (ref 0–0.2)
BASOPHILS NFR BLD: 0.6 % (ref 0–1.9)
BILIRUB SERPL-MCNC: 0.5 MG/DL (ref 0.1–1)
BUN SERPL-MCNC: 15 MG/DL (ref 8–23)
CALCIUM SERPL-MCNC: 9.1 MG/DL (ref 8.7–10.5)
CHLORIDE SERPL-SCNC: 107 MMOL/L (ref 95–110)
CO2 SERPL-SCNC: 22 MMOL/L (ref 23–29)
CREAT SERPL-MCNC: 0.7 MG/DL (ref 0.5–1.4)
DIFFERENTIAL METHOD: ABNORMAL
EOSINOPHIL # BLD AUTO: 0.2 K/UL (ref 0–0.5)
EOSINOPHIL NFR BLD: 2.1 % (ref 0–8)
ERYTHROCYTE [DISTWIDTH] IN BLOOD BY AUTOMATED COUNT: 18.6 % (ref 11.5–14.5)
EST. GFR  (AFRICAN AMERICAN): >60 ML/MIN/1.73 M^2
EST. GFR  (NON AFRICAN AMERICAN): >60 ML/MIN/1.73 M^2
GLUCOSE SERPL-MCNC: 97 MG/DL (ref 70–110)
HCT VFR BLD AUTO: 37.9 % (ref 40–54)
HGB BLD-MCNC: 11.9 G/DL (ref 14–18)
IMM GRANULOCYTES # BLD AUTO: 0.25 K/UL (ref 0–0.04)
IMM GRANULOCYTES NFR BLD AUTO: 2.3 % (ref 0–0.5)
INR PPP: 1 (ref 0.8–1.2)
LYMPHOCYTES # BLD AUTO: 2.8 K/UL (ref 1–4.8)
LYMPHOCYTES NFR BLD: 26.4 % (ref 18–48)
MAGNESIUM SERPL-MCNC: 2.1 MG/DL (ref 1.6–2.6)
MCH RBC QN AUTO: 28.3 PG (ref 27–31)
MCHC RBC AUTO-ENTMCNC: 31.4 G/DL (ref 32–36)
MCV RBC AUTO: 90 FL (ref 82–98)
MONOCYTES # BLD AUTO: 1.1 K/UL (ref 0.3–1)
MONOCYTES NFR BLD: 10 % (ref 4–15)
NEUTROPHILS # BLD AUTO: 6.3 K/UL (ref 1.8–7.7)
NEUTROPHILS NFR BLD: 58.6 % (ref 38–73)
NRBC BLD-RTO: 0 /100 WBC
PHOSPHATE SERPL-MCNC: 3.7 MG/DL (ref 2.7–4.5)
PLATELET # BLD AUTO: 318 K/UL (ref 150–450)
PMV BLD AUTO: 11 FL (ref 9.2–12.9)
POCT GLUCOSE: 106 MG/DL (ref 70–110)
POCT GLUCOSE: 106 MG/DL (ref 70–110)
POCT GLUCOSE: 109 MG/DL (ref 70–110)
POTASSIUM SERPL-SCNC: 4.3 MMOL/L (ref 3.5–5.1)
PROT SERPL-MCNC: 6.5 G/DL (ref 6–8.4)
PROTHROMBIN TIME: 10 SEC (ref 9–12.5)
RBC # BLD AUTO: 4.21 M/UL (ref 4.6–6.2)
SODIUM SERPL-SCNC: 140 MMOL/L (ref 136–145)
SODIUM SERPL-SCNC: 141 MMOL/L (ref 136–145)
WBC # BLD AUTO: 10.73 K/UL (ref 3.9–12.7)

## 2022-05-13 PROCEDURE — 25000242 PHARM REV CODE 250 ALT 637 W/ HCPCS: Performed by: STUDENT IN AN ORGANIZED HEALTH CARE EDUCATION/TRAINING PROGRAM

## 2022-05-13 PROCEDURE — 36415 COLL VENOUS BLD VENIPUNCTURE: CPT | Performed by: PHYSICIAN ASSISTANT

## 2022-05-13 PROCEDURE — 63600175 PHARM REV CODE 636 W HCPCS: Performed by: PHYSICIAN ASSISTANT

## 2022-05-13 PROCEDURE — 94761 N-INVAS EAR/PLS OXIMETRY MLT: CPT

## 2022-05-13 PROCEDURE — 85730 THROMBOPLASTIN TIME PARTIAL: CPT | Mod: 91 | Performed by: PHYSICIAN ASSISTANT

## 2022-05-13 PROCEDURE — 84295 ASSAY OF SERUM SODIUM: CPT

## 2022-05-13 PROCEDURE — 25000003 PHARM REV CODE 250: Performed by: HOSPITALIST

## 2022-05-13 PROCEDURE — 99233 SBSQ HOSP IP/OBS HIGH 50: CPT | Mod: GC,,, | Performed by: HOSPITALIST

## 2022-05-13 PROCEDURE — 99024 PR POST-OP FOLLOW-UP VISIT: ICD-10-PCS | Mod: ,,, | Performed by: PHYSICIAN ASSISTANT

## 2022-05-13 PROCEDURE — 63600175 PHARM REV CODE 636 W HCPCS: Performed by: STUDENT IN AN ORGANIZED HEALTH CARE EDUCATION/TRAINING PROGRAM

## 2022-05-13 PROCEDURE — 80053 COMPREHEN METABOLIC PANEL: CPT | Performed by: STUDENT IN AN ORGANIZED HEALTH CARE EDUCATION/TRAINING PROGRAM

## 2022-05-13 PROCEDURE — 92526 ORAL FUNCTION THERAPY: CPT

## 2022-05-13 PROCEDURE — 83735 ASSAY OF MAGNESIUM: CPT | Performed by: STUDENT IN AN ORGANIZED HEALTH CARE EDUCATION/TRAINING PROGRAM

## 2022-05-13 PROCEDURE — 93005 ELECTROCARDIOGRAM TRACING: CPT

## 2022-05-13 PROCEDURE — 63700000 PHARM REV CODE 250 ALT 637 W/O HCPCS: Performed by: STUDENT IN AN ORGANIZED HEALTH CARE EDUCATION/TRAINING PROGRAM

## 2022-05-13 PROCEDURE — 85025 COMPLETE CBC W/AUTO DIFF WBC: CPT | Performed by: STUDENT IN AN ORGANIZED HEALTH CARE EDUCATION/TRAINING PROGRAM

## 2022-05-13 PROCEDURE — 94668 MNPJ CHEST WALL SBSQ: CPT

## 2022-05-13 PROCEDURE — 85730 THROMBOPLASTIN TIME PARTIAL: CPT | Performed by: PHYSICIAN ASSISTANT

## 2022-05-13 PROCEDURE — 99024 POSTOP FOLLOW-UP VISIT: CPT | Mod: ,,, | Performed by: PHYSICIAN ASSISTANT

## 2022-05-13 PROCEDURE — 93010 ELECTROCARDIOGRAM REPORT: CPT | Mod: ,,, | Performed by: INTERNAL MEDICINE

## 2022-05-13 PROCEDURE — 85610 PROTHROMBIN TIME: CPT | Performed by: PHYSICIAN ASSISTANT

## 2022-05-13 PROCEDURE — 94640 AIRWAY INHALATION TREATMENT: CPT

## 2022-05-13 PROCEDURE — 99900035 HC TECH TIME PER 15 MIN (STAT)

## 2022-05-13 PROCEDURE — 25000003 PHARM REV CODE 250: Performed by: STUDENT IN AN ORGANIZED HEALTH CARE EDUCATION/TRAINING PROGRAM

## 2022-05-13 PROCEDURE — 25000003 PHARM REV CODE 250: Performed by: PHYSICIAN ASSISTANT

## 2022-05-13 PROCEDURE — 99233 PR SUBSEQUENT HOSPITAL CARE,LEVL III: ICD-10-PCS | Mod: GC,,, | Performed by: HOSPITALIST

## 2022-05-13 PROCEDURE — 84100 ASSAY OF PHOSPHORUS: CPT | Performed by: STUDENT IN AN ORGANIZED HEALTH CARE EDUCATION/TRAINING PROGRAM

## 2022-05-13 PROCEDURE — 93010 EKG 12-LEAD: ICD-10-PCS | Mod: ,,, | Performed by: INTERNAL MEDICINE

## 2022-05-13 RX ORDER — DEXAMETHASONE 2 MG/1
2 TABLET ORAL DAILY
Qty: 1 TABLET | Refills: 0
Start: 2022-05-13 | End: 2022-05-14

## 2022-05-13 RX ORDER — TALC
9 POWDER (GRAM) TOPICAL NIGHTLY
Refills: 0
Start: 2022-05-13 | End: 2022-07-08

## 2022-05-13 RX ORDER — QUETIAPINE FUMARATE 50 MG/1
50 TABLET, FILM COATED ORAL NIGHTLY
Qty: 30 TABLET | Refills: 11
Start: 2022-05-13 | End: 2022-07-08

## 2022-05-13 RX ORDER — SILODOSIN 4 MG/1
4 CAPSULE ORAL DAILY
Qty: 30 CAPSULE | Refills: 11
Start: 2022-05-13 | End: 2022-06-08

## 2022-05-13 RX ORDER — LIDOCAINE 50 MG/G
1 PATCH TOPICAL DAILY
Refills: 0
Start: 2022-05-13 | End: 2022-07-08

## 2022-05-13 RX ORDER — OXYCODONE HYDROCHLORIDE 5 MG/1
5 TABLET ORAL EVERY 6 HOURS PRN
Refills: 0
Start: 2022-05-13 | End: 2022-05-13 | Stop reason: SDUPTHER

## 2022-05-13 RX ORDER — AZITHROMYCIN 500 MG/1
500 TABLET, FILM COATED ORAL DAILY
Start: 2022-05-13 | End: 2022-07-18 | Stop reason: SDUPTHER

## 2022-05-13 RX ORDER — OXYCODONE HYDROCHLORIDE 5 MG/1
5 TABLET ORAL EVERY 6 HOURS PRN
Qty: 56 TABLET | Refills: 0 | Status: SHIPPED | OUTPATIENT
Start: 2022-05-13 | End: 2022-06-08 | Stop reason: ALTCHOICE

## 2022-05-13 RX ADMIN — IPRATROPIUM BROMIDE AND ALBUTEROL SULFATE 3 ML: 2.5; .5 SOLUTION RESPIRATORY (INHALATION) at 02:05

## 2022-05-13 RX ADMIN — AZITHROMYCIN MONOHYDRATE 500 MG: 250 TABLET ORAL at 09:05

## 2022-05-13 RX ADMIN — IPRATROPIUM BROMIDE AND ALBUTEROL SULFATE 3 ML: 2.5; .5 SOLUTION RESPIRATORY (INHALATION) at 07:05

## 2022-05-13 RX ADMIN — SILODOSIN 4 MG: 4 CAPSULE ORAL at 09:05

## 2022-05-13 RX ADMIN — LIDOCAINE 1 PATCH: 50 PATCH CUTANEOUS at 01:05

## 2022-05-13 RX ADMIN — DEXAMETHASONE 2 MG: 1 TABLET ORAL at 09:05

## 2022-05-13 RX ADMIN — THIAMINE HCL TAB 100 MG 100 MG: 100 TAB at 09:05

## 2022-05-13 RX ADMIN — HEPARIN SODIUM 5000 UNITS: 5000 INJECTION INTRAVENOUS; SUBCUTANEOUS at 01:05

## 2022-05-13 RX ADMIN — SODIUM CHLORIDE SOLN NEBU 3% 4 ML: 3 NEBU SOLN at 07:05

## 2022-05-13 RX ADMIN — HEPARIN SODIUM 5000 UNITS: 5000 INJECTION INTRAVENOUS; SUBCUTANEOUS at 05:05

## 2022-05-13 RX ADMIN — ETHAMBUTOL HYDROCHLORIDE 800 MG: 400 TABLET, FILM COATED ORAL at 09:05

## 2022-05-13 RX ADMIN — ACETAMINOPHEN 1000 MG: 500 TABLET ORAL at 09:05

## 2022-05-13 RX ADMIN — THERA TABS 1 TABLET: TAB at 09:05

## 2022-05-13 RX ADMIN — FAMOTIDINE 20 MG: 20 TABLET ORAL at 09:05

## 2022-05-13 RX ADMIN — FOLIC ACID 1 MG: 1 TABLET ORAL at 09:05

## 2022-05-13 NOTE — PT/OT/SLP PROGRESS
"Speech Language Pathology Treatment    Patient Name:  Diego Gunter   MRN:  [id number]  Admitting Diagnosis: Epidural hematoma    Recommendations:                 General Recommendations:  Dysphagia therapy  Diet recommendations:  Mechanical soft, Thin   Per IDDSI guidelines, all items must be minced and moist. Please avoid mixed consistencies (such as cereals, soups with large pieces of meat/vegetable, etc.), avoid dry/coarse/crumbly items such as rice ,nuts, seeds, dried fruit, coconut, avoid fibrous foods, avoid tough skins, avoid tough vegetables (i.e. no corn, brussel sprouts,etc) avoid chewy/sticky foods (i.e. no peanut putter, caramel, licorice, etc.)    Aspiration Precautions: 1 bite/sip at a time, Double swallow with each bite/sip, Eliminate distractions, Feed only when awake/alert, Frequent oral care, HOB to 90 degrees, Meds crushed in puree, Remain upright 30 minutes post meal, Small bites/sips and Strict aspiration precautions. Please continue to monitor for signs and symptoms of aspiration and discontinue oral feeding should you notice any of the following: watery eyes, reddened facial area, wet vocal quality, increased work of breathing, change in respiratory status, increased congestion, coughing, fever and/or change in level of alertness.  General Precautions: Standard, aspiration, fall  Communication strategies:  go to room if call light pushed    Subjective     SLP reviewed Pt with RN prior to session  Pt presents calm  He explains, "I'm ok"    Pain/Comfort:  · Pain Rating 1: 0/10    Respiratory Status: Room air    Objective:     Has the patient been evaluated by SLP for swallowing?   Yes  Keep patient NPO? No   Current Respiratory Status:        Pt found awake and alert in bed eating bites of soft solids (stew) and drinking cup edge sips thin liquids. Pt seen with bites x5 and sips x3. No overt S/s aspiration with self-fed bites/sips provided minimally distracting environment and cues to take " single sips. He was oriented x4. He named on average 5 items/minute in a concrete category provided mod cues across two attempts divergent naming tasks. He demonstrated mild word-finding difficulty as c/b perseveration and occasional semsantic paraphasias t/o conversational speech and structured verbal expression tasks. He at times was easily distracted and required cues to redirect to task.  SLP educated Pt on ongoing word-finding strategies, aspiration precautions and SLP POC. No additional questions. Pt and Spouse verbalized understanding.     Assessment:     Diego Gunter is a 81 y.o. male with an SLP diagnosis of Aphasia and Dysphagia.      Goals:   Multidisciplinary Problems     SLP Goals        Problem: SLP    Goal Priority Disciplines Outcome   SLP Goal    Low SLP Ongoing, Progressing   Description: Speech Language Pathology Goals  Additional goals to be met by 5/16:   1. Pt will tolerate mechanical soft diet with thin liquids without overt s/sx airway threat.  2. Pt will Ox4 IND.  3. Pt will answer responsive naming questions with 90% acc given min cues.  4. Pt will complete short term recall tasks with min cues.   5. Pt will complete generative naming tasks with min cues.   6. Pt will complete basic reasoning tasks with min cues.                     Plan:     · Patient to be seen:  4 x/week   · Plan of Care expires:     · Plan of Care reviewed with:  patient, spouse   · SLP Follow-Up:  Yes       Discharge recommendations:  nursing facility, skilled       Time Tracking:     SLP Treatment Date:   05/13/22  Speech Start Time:  1149  Speech Stop Time:  1201     Speech Total Time (min):  12 min    Billable Minutes: Treatment Swallowing Dysfunction 12    05/13/2022

## 2022-05-13 NOTE — TELEPHONE ENCOUNTER
Spoke with patient's wife. She was busy at the moment, but confirmed she will check appointment times and dates on portal and will call back with any issues or concerns.    ----- Message from Elysia Dexter PA-C sent at 5/13/2022 12:31 PM CDT -----  Pt is s/p C3-C6 PCF on 4/28 by Paul. He will be getting his staples removed at [facility]. Can we arrange 6 week follow up with Paul with XR? I have ordered the imaging    Thanks,  Elysia

## 2022-05-13 NOTE — PLAN OF CARE
[facility] - Neurosurgery (Hospital)  Discharge Final Note    Primary Care Provider: Portia Ferreira MD  Expected Discharge Date: 5/13/2022  Patient medically ready for discharge to [facility] today.  Nurse can call report to [phone].  Transportation via stretcher scheduled for 2 pm per [facility].     Final Discharge Note (most recent)     Final Note - 05/13/22 1220        Final Note    Assessment Type Final Discharge Note     Anticipated Discharge Disposition Skilled Nursing Facility     What phone number can be called within the next 1-3 days to see how you are doing after discharge? [phone]     Hospital Resources/Appts/Education Provided Provided patient/caregiver with written discharge plan information;Provided education on problems/symptoms using teachback        Post-Acute Status    Post-Acute Authorization Placement     Post-Acute Placement Status Set-up Complete/Auth obtained     Discharge Delays None known at this time               Important Message from Medicare  Important Message from Medicare regarding Discharge Appeal Rights: Given to patient/caregiver, Explained to patient/caregiver, Signed/date by patient/caregiver   Date IMM was signed: 05/13/22  Time IMM was signed: 0847  Referral Info (most recent)     Referral Info    No documentation.               Contact Info     [facility] - Urology [facility]   Specialty: Urology    [address]  [facility] [id number]   Phone: [phone]       Next Steps: Follow up in 2 week(s)        Future Appointments   Date Time Provider Department Center   5/16/2022  8:00 AM LAB, [facility] [place]   5/20/2022 12:00 PM [facility] US[facility]   5/20/2022  2:20 PM [facility] UROLOGY PHYSICIAN CLINIC [facility] UROLOG [facility]   5/25/2022 12:00 PM LAB, [facility] [place]   5/27/2022  1:00 PM Joaquín Pantoja MD [facility] HEM ONC [facility]   6/15/2022  9:30 AM INFECTIOUS DISEASE, [facility] INF[facility]D [facility]     SHERI Kimble  Case  Management  Ext: [phone]  05/13/2022

## 2022-05-13 NOTE — PLAN OF CARE
[facility]    FACILITY TRANSFER ORDERS      Patient Name: Diego Gunter  YOB: 1940    PCP: Portia Ferreira MD   PCP Address: [address] / [place]  PCP Phone Number: [phone]  PCP Fax: [phone]    Encounter Date: 05/13/2022    Admit to: SNF    Vital Signs:  Routine    Diagnoses:   Active Hospital Problems    Diagnosis  POA    *Epidural hematoma [S06.4X9A]  Yes     C3-C6      Hypotension [I95.9]  Unknown    Hypernatremia [E87.0]  No    Impaired mobility [Z74.09]  Unknown    Acute encephalopathy [G93.40]  Yes    Intermittent confusion [R41.0]  Yes    Urinary retention [R33.9]  Yes    Acute ischemic left MCA stroke [I63.512]  Yes    Antiphospholipid syndrome [D68.61]  Yes    Hypercoagulable state [D68.59]  Yes    History of pulmonary embolism [Z86.711]  Yes     1/30/21 CTA chest left sided pulmonary embolism.  S/p coumadin x 6 months.  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 Clark.  In light of evolving rul cavitary lesion, will refer back to ID.  Dr. Clark no longer practice infectious disease.  ID appointment on 10/6/21      Cavitary lung disease [J98.4]  Yes     rul cavitary lesion s/p fob.  Cytology from fob is negative for malignancy.  Sputum 10/3/18 with brie. Lesion with thicker rim and contraction.  Await id inputs.        Hyperlipidemia associated with type 2 diabetes mellitus [E11.69, E78.5]  Yes    Prediabetes [R73.03]  Yes     - hx of diet controlled type 2 diabetes - resolved 3/2022 due to A1c < 6.5 for at least 5 years without medication  - not on ace inhibitor  (has intermittent high potassium level)          History of prostate cancer  [Z85.46]  Not Applicable     10/2011 - S/p prostatectomy  - followed by urology, Dr. Rogers        Resolved Hospital Problems   No resolved problems to display.       Allergies:Review of patient's allergies indicates:  No Known Allergies    Diet: mechanical soft with thin liquids (no straws)     Activities: Activity as tolerated    Nursing: Brace to be worn when sitting upright or out of bed. Okay to remove when lying at angle or flat      Floyd to stay in place until Urology follow up.    Labs, DVT prophylaxis, and bowel regimen per facility protocol.     Please continue to hold patient's warfarin and aspirin per Hematology/oncology recommendations     CONSULTS:    Physical Therapy to evaluate and treat 5 x daily , Occupational Therapy to evaluate and treat 5 x daily. and Speech Therapy to evaluate and treat for Swallowing and Cognition 5 x daily.      WOUND CARE ORDERS  Apply bacitracin to incision twice daily. Remove staples on 5/19. Please send a picture to [facility].     Medications: Review discharge medications with patient and family and provide education.     Continue azithromycin and ethambutol until follow up with infectious disease.      Current Discharge Medication List      START taking these medications    Details   azithromycin (ZITHROMAX) 500 MG tablet Take 1 tablet (500 mg total) by mouth once daily.      dexAMETHasone (DECADRON) 2 MG tablet Take 1 tablet (2 mg total) by mouth once daily. for 1 day  Qty: 1 tablet, Refills: 0      LIDOcaine (LIDODERM) 5 % Place 1 patch onto the skin once daily. Remove & Discard patch within 12 hours or as directed by MD  Refills: 0      melatonin (MELATIN) 3 mg tablet Take 3 tablets (9 mg total) by mouth nightly.  Refills: 0      oxyCODONE (ROXICODONE) 5 MG immediate release tablet Take 1 tablet (5 mg total) by mouth every 6 (six) hours as needed for Pain.  Refills: 0    Comments: Quantity prescribed more than 7 day supply? Yes, quantity medically necessary       QUEtiapine (SEROQUEL) 50 MG tablet Take 1 tablet (50 mg total) by mouth every evening.  Qty: 30 tablet, Refills: 11      silodosin (RAPAFLO) 4 mg Cap capsule Take 1 capsule (4 mg total) by mouth once daily.  Qty: 30 capsule, Refills: 11         CONTINUE these medications which have NOT CHANGED    Details   albuterol (PROVENTIL/VENTOLIN HFA) 90 mcg/actuation inhaler INHALE 2 PUFFS BY MOUTH EVERY 6 HOURS AS NEEDED FOR WHEEZING  Qty: 9 g, Refills: 0      ascorbic acid, vitamin C, (VITAMIN C) 1000 MG tablet Take 1,000 mg by mouth once daily.      b complex vitamins capsule Take 1 capsule by mouth once daily.      ethambutoL (MYAMBUTOL) 400 MG Tab Take 2 tablets (800 mg total) by mouth once daily.  Qty: 60 tablet, Refills: 5                 nebulizer and compressor ([employer] COMPRESSOR SYSTEM) Marcy use to administer nebulized medication as directed  Qty: 1 each, Refills: 0    Associated Diagnoses: Mycobacterium avium-intracellulare complex      omeprazole 20 mg TbEC 1/2 tablet daily in am as needed.  Qty: 45 each, Refills: 0    Associated Diagnoses: Dysphagia, unspecified type      pravastatin (PRAVACHOL) 10 MG tablet Take 1 tablet (10 mg total) by mouth once daily.  Qty: 90 tablet, Refills: 0    Associated Diagnoses: Hyperlipidemia LDL goal <100      promethazine-dextromethorphan (PROMETHAZINE-DM) 6.25-15 mg/5 mL Syrp Take 10-15ml by mouth every 8 hours as needed for coughing  Qty: 240 mL, Refills: 0    Associated Diagnoses: Viral upper respiratory tract infection      sodium chloride 3% 3 % nebulizer solution USE 4 ML VIAL IN NEBULIZER  TWICE DAILY  Qty: 480 mL, Refills: 11    Associated Diagnoses: Mycobacterium avium-intracellulare complex         STOP taking these medications       aspirin (ECOTRIN) 81 MG EC tablet Comments:   Reason for Stopping:         omega 3-dha-epa-fish oil 1,000 (120-180) mg Cap Comments:   Reason for Stopping:         omega-3 fatty acids 1,000 mg Cap Comments:   Reason for Stopping:          warfarin (COUMADIN) 3 MG tablet Comments:   Reason for Stopping:                  Immunizations Administered as of 5/13/2022     Name Date Dose VIS Date Route Exp Date    COVID-19, MRNA, LN-S, PF (Pfizer) (Purple Cap) 11/5/2021 0.3 mL -- Intramuscular --    Site: Right arm     : Pf[employer]     Lot: [id number]     COVID-19, MRNA, LN-S, PF (Pfizer) (Purple Cap) 4/28/2021  9:30 AM 0.3 mL 12/12/2020 Intramuscular 8/31/2021    Site: Right deltoid     Given By: Dalia Bueno     : [employer]     Lot: [id number]     COVID-19, MRNA, LN-S, PF (Pfizer) (Purple Cap) 1/13/2021  8:03 AM 0.3 mL 12/12/2020 Intramuscular 3/31/2021    Site: Right arm     Given By: Sumi Ly RN     : Pf[employer]     Lot: [id number]           This patient has had both covid vaccinations    Some patients may experience side effects after vaccination.  These may include fever, headache, muscle or joint aches.  Most symptoms resolve with 24-48 hours and do not require urgent medical evaluation unless they persist for more than 72 hours or symptoms are concerning for an unrelated medical condition.          _________________________________  Elysia Dexter PA-C  05/13/2022

## 2022-05-13 NOTE — DISCHARGE SUMMARY
[facility] ([facility])  Neurosurgery  Discharge Summary      Patient Name: Diego Gunter  MRN: [id number]  Admission Date: 4/28/2022  Hospital Length of Stay: 15 days  Discharge Date and Time: 5/13/22  Attending Physician: Carlos Miller MD   Discharging Provider: Elysia Dexter PA-C  Primary Care Provider: Portia Ferreira MD    HPI:   Patient is a 80 y/o male with antiphospholipid syndrome on chronic coumadin, MAC pneumonia, HLD, and left PE diagnosed January 2021 who was transferred from  for compressive epidural hematoma in the cervical spine. Patient reports that he woke up in the middle of the night and could no move the right side of his body. He went to  ED and stroke code initiated. Patient was not given tPA but given ASA. CTA was negative for acute CVA but did show compressive fluid collective from C3-C6. MRI cervical spine confirmed. Patient was transferred to [facility] ED for neurosurgical evaluation. He reports he cannot move his right arm out of the contracted position, he also cannot lift his right leg off the bed. He reports the right arm and leg are painful. No complaints on left side of body. Denies b/b dysfunction.       Procedure(s) (LRB):  LAMINECTOMY, SPINE, CERVICAL, POSTERIOR APPROACH C3-6 (Bilateral)     Hospital Course: 4/30 pt s/p C3-6 PCF and epidural hematoma evac on 4/28. Naeon, continued delirium FU MRI C spine today for post op eval  5/1 naeon, mental status improving, moving all to command well. Postop MRI satisfactory decompression. Continue MAP through today, DC both drains.  5/2: NAEON. AFVSS. Subjective strength improvement. cvEEG negative for seizure.   5/3: Patient in restraints for agitation overnight. Calm and oriented x 3 this AM. Strength improving. No Bmx1 week, enema ordered. Leukocytosis, infectious work up pending. Medicine consulted, appreciate their assistance. Worried about polypharmacy contributing to patient's mental status. Robaxin and Lyrica  discontinued, scheduled tylenol and lidocaine patches ordered.   5/4: Patient with improvement in mental status. Family at bedside endorses improvement. Patient with BM s/p enema. Medicine following. BLE US, CXR, and EKG neg for acute processes.   5/5: Floyd replaced due to significant retention of ~2 L urine. Urology following. Patient neurologically stable. Aox3. Full strength. Tele sitter discontinued. Patient without restraints overnight. Final path on hematoma as blood clot - daptomycin discontinued. Blood pressure stable after fluids. Zosyn started for empiric coverage for gram neg and anaerobes. Appreciate assistance from multiple teams. Pending rehab.   5/6: AFVSS. Neuro stable. Respiratory cultures to be collected. Continue Zosyn. Pending rehab. Requires physician surveillance in setting of recent spinal surgery and urinary retention.   5/7: Agitation and confusion this AM. Restraints replaced as patient combative and struck sitter. Will wean restraints and encourage redirection from sitter and wife at bedside. Start nightly Seroquel. Strength stable.   5/8: Continue agitation and confusion continue nightly Seroquel. Pending IPR   5/9: Agitation and confusion refractory to depakote. PRN Seroquel added. Strength stable. F/u agitation/delerium workup. Telesitter at bedside.   5/10: NAEON. Patient awake and alert this AM, family reports good sleep overnight and note improved cognition. Motor exam stable. Incision with staples intact. Denied for IPR, pending SNF  5/11: Patient attempting to climb out of bed early this AM, redirectable. Weaning steroids, end date 5/13. Motor exam stable. Pending SNF  5/12: NAEON. Patient rested well overnight, AAOx2. Neuro exam stable. Na 144. Pending SNF  5/13: NAEON. Patient appears bright and wide awake this morning. AAOx3. Stable for discharge to SNF    Goals of Care Treatment Preferences:  Code Status: Full Code    Living Will: Yes             Exam day of discharge:    General: well developed, well nourished, no distress.   Head: normocephalic, atraumatic  Neurologic: Alert and oriented. Thought content appropriate  Mental Status: Awake, Alert, Oriented x 3   Language: No aphasia  Speech: No dysarthria  Cranial nerves: face symmetric,   Eyes: pupils equal, round, reactive to light with accommodation, EOMI.   Pulmonary: normal respirations, no signs of respiratory distress  Abdomen: soft,slightly distended  Skin: Skin is warm, dry and intact.  Sensory: intact to light touch throughout     Motor Strength:Moves all extremities spontaneously with good tone.  Full strength upper and lower extremities. Intermittent twitching noted to BUE/BLE   Strength   Deltoids Triceps Biceps Wrist Extension Wrist Flexion Hand    Upper: R 5/5 5/5 5/5 5/5 5/5 5/5     L 5/5 5/5 5/5 5/5 5/5 5/5       Iliopsoas Quadriceps Knee  Flexion Tibialis  anterior Gastro- cnemius EHL   Lower: R 5/5 5/5 5/5 5/5 5/5 5/5     L 5/5 5/5 5/5 5/5 5/5 5/5      Posterior cervical incision: Clean, dry, staples intact.            Consults:   Consults (From admission, onward)        Status Ordering Provider     Inpatient consult to Spiritual Care  Once        Provider:  (Not yet assigned)    Completed SALAS COLON     Inpatient consult to Infectious Diseases  Once        Provider:  (Not yet assigned)    Completed SALAS COLON     Inpatient consult to Urology  Once        Provider:  (Not yet assigned)    Completed VALERY BELTRAN     Inpatient consult to [facility] Medicine-General  Once        Provider:  (Not yet assigned)    Completed EDWARD SALCIDO     IP consult to case management/social work  Once        Provider:  (Not yet assigned)    Acknowledged LANDEN CHONG     Inpatient consult to Physical Medicine Rehab  Once        Provider:  (Not yet assigned)    Completed ROSEMARY HWANG     Inpatient consult to Social Work  Once        Provider:  (Not yet assigned)    Acknowledged JADON OQUENDO      Inpatient consult to Infectious Diseases  Once        Provider:  (Not yet assigned)    Completed SOPHIA VÁZQUEZ     Inpatient consult to Physical Medicine Rehab  Once        Provider:  (Not yet assigned)    Completed SANDRA CORTÉS     Inpatient consult to Registered Dietitian/Nutritionist  Once        Provider:  (Not yet assigned)    Completed SANDRA CORTÉS     IP consult to case management/social work  Once        Provider:  (Not yet assigned)    Acknowledged JADON OQUENDO     Inpatient consult to Hematology/Oncology  Once        Provider:  (Not yet assigned)    Completed SANDRA CORTÉS     Inpatient consult to Neurosurgery  Once        Provider:  (Not yet assigned)    Completed KELTON VIGIL     Inpatient consult to Registered Dietitian/Nutritionist  Once        Provider:  (Not yet assigned)    Completed SUSANA PIMENTEL     IP consult to case management/social work  Once        Provider:  (Not yet assigned)    Completed SUSANA PIMENTEL     Inpatient consult to Registered Dietitian/Nutritionist  Once        Provider:  (Not yet assigned)    Completed SUSANA PIMENTEL     IP consult to case management/social work  Once        Provider:  (Not yet assigned)    Completed SUSANA PIMENTEL          Significant Diagnostic Studies: US Lower extremity, MRI brain w wo, MBSS, CTH, MRI cervical spine    Pending Diagnostic Studies:     Procedure Component Value Units Date/Time    Sodium [[id number]] Collected: 05/01/22 0538    Order Status: Sent Lab Status: In process Updated: 05/01/22 0539    Specimen: Blood     Sodium, urine, random [[id number]] Collected: 04/29/22 0747    Order Status: Sent Lab Status: In process Updated: 04/29/22 0747    Specimen: Urine, Catheterized     Urinalysis, Reflex to Urine Culture Urine, Catheterized [[id number]] Collected: 05/08/22 1850    Order Status: Sent Lab Status: In process Updated: 05/08/22 1851    Specimen: Urine         Final Active Diagnoses:    Diagnosis Date Noted POA    PRINCIPAL  PROBLEM:  Epidural hematoma [S06.4X9A] 04/28/2022 Yes    Hypotension [I95.9]  Unknown    Hypernatremia [E87.0] 05/05/2022 No    Impaired mobility [Z74.09] 05/03/2022 Unknown    Acute encephalopathy [G93.40]  Yes    Intermittent confusion [R41.0] 04/29/2022 Yes    Urinary retention [R33.9] 04/29/2022 Yes    Acute ischemic left MCA stroke [I63.512] 04/28/2022 Yes    Antiphospholipid syndrome [D68.61] 11/19/2021 Yes    Hypercoagulable state [D68.59] 06/11/2021 Yes    History of pulmonary embolism [Z86.711] 01/30/2021 Yes    Cognitive communication deficit [R41.841] 11/25/2020 Yes    Mild cognitive impairment [G31.84] 04/09/2020 Yes    HILLARY (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    Specialty: Urology  Contact information:  [facility] [id number] [phone]  Additional information:  Please park in the Cancer Center surface lot on the [place] side and check in on the 2nd floor                     Patient Instructions:      US Kidney   Standing Status: Future Standing Exp. Date: 05/05/23     Order Specific Question Answer Comments   Reason for Exam: b/l hydronephrosis    May the Radiologist modify the order per protocol to meet the clinical needs of the patient? Yes    Release to patient Immediate      X-Ray Cervical Spine AP And Lateral   Standing Status: Future Standing Exp. Date: 05/13/23     Order Specific Question Answer Comments   May the Radiologist modify the order per protocol to  meet the clinical needs of the patient? Yes    Release to patient Immediate      Notify your health care provider if you experience any of the following:  temperature >100.4     Notify your health care provider if you experience any of the following:  persistent nausea and vomiting or diarrhea     Notify your health care provider if you experience any of the following:  severe uncontrolled pain     Notify your health care provider if you experience any of the following:  redness, tenderness, or signs of infection (pain, swelling, redness, odor or green/yellow discharge around incision site)     Notify your health care provider if you experience any of the following:  difficulty breathing or increased cough     Notify your health care provider if you experience any of the following:  severe persistent headache     Notify your health care provider if you experience any of the following:  worsening rash     Notify your health care provider if you experience any of the following:  persistent dizziness, light-headedness, or visual disturbances     Notify your health care provider if you experience any of the following:  increased confusion or weakness     Activity as tolerated     Medications:  Reconciled Home Medications:      Medication List      START taking these medications    azithromycin 500 MG tablet  Commonly known as: ZITHROMAX  Take 1 tablet (500 mg total) by mouth once daily.     dexAMETHasone 2 MG tablet  Commonly known as: DECADRON  Take 1 tablet (2 mg total) by mouth once daily. for 1 day     LIDOcaine 5 %  Commonly known as: LIDODERM  Place 1 patch onto the skin once daily. Remove & Discard patch within 12 hours or as directed by MD     melatonin 3 mg tablet  Commonly known as: MELATIN  Take 3 tablets (9 mg total) by mouth nightly.     oxyCODONE 5 MG immediate release tablet  Commonly known as: ROXICODONE  Take 1 tablet (5 mg total) by mouth every 6 (six) hours as needed for Pain.     QUEtiapine 50 MG  tablet  Commonly known as: SEROQUEL  Take 1 tablet (50 mg total) by mouth every evening.     silodosin 4 mg Cap capsule  Commonly known as: RAPAFLO  Take 1 capsule (4 mg total) by mouth once daily.        CONTINUE taking these medications    albuterol 90 mcg/actuation inhaler  Commonly known as: PROVENTIL/VENTOLIN HFA  INHALE 2 PUFFS BY MOUTH EVERY 6 HOURS AS NEEDED FOR WHEEZING     ascorbic acid (vitamin C) 1000 MG tablet  Commonly known as: VITAMIN C  Take 1,000 mg by mouth once daily.     b complex vitamins capsule  Take 1 capsule by mouth once daily.     ethambutoL 400 MG Tab  Commonly known as: MYAMBUTOL  Take 2 tablets (800 mg total) by mouth once daily.     mucus clearing device  Commonly known as: AEROBIKA OSCILLATING PEP SYSTM  by Misc.(Non-Drug; Combo Route) route 2 (two) times a day.     nebulizer and compressor Marcy  Commonly known as: [employer] COMPRESSOR SYSTEM  use to administer nebulized medication as directed     omeprazole 20 mg Tbec  1/2 tablet daily in am as needed.     pravastatin 10 MG tablet  Commonly known as: PRAVACHOL  Take 1 tablet (10 mg total) by mouth once daily.     promethazine-dextromethorphan 6.25-15 mg/5 mL Syrp  Commonly known as: PROMETHAZINE-DM  Take 10-15ml by mouth every 8 hours as needed for coughing     sodium chloride 3% 3 % nebulizer solution  USE 4 ML VIAL IN NEBULIZER  TWICE DAILY        STOP taking these medications    aspirin 81 MG EC tablet  Commonly known as: ECOTRIN     omega 3-dha-epa-fish oil 1,000 mg (120 mg-180 mg) Cap     omega-3 fatty acids 1,000 mg Cap     warfarin 3 MG tablet  Commonly known as: COUMADIN            Elysia Dexter PA-C  Neurosurgery  [facility] - Neurosurgery ([facility])

## 2022-05-13 NOTE — NURSING
Discharge instructions were given to the patient and his wife and they both verbalized their understanding No s/s of any pain or discomfort noted. No s/s of any respiratory distress noted. The IV and telemetry were discontinued as ordered. The patient was transported via stretcher from the unit with his wife and his belongings. Report was called to the receiving facility. No apparent distress noted.

## 2022-05-13 NOTE — PROGRESS NOTES
"[facility] - Neurosurgery ([facility])  [facility] Medicine  Progress Note    Patient Name: Diego Gunter  MRN: [id number]  Patient Class: IP- Inpatient   Admission Date: 4/28/2022  Length of Stay: 15 days  Attending Physician: Carlos Miller MD  Primary Care Provider: Portia Ferreira MD        Subjective:     Principal Problem:Epidural hematoma        HPI:  Mr. Gunter is a 81 year-old male with antiphospholipid syndrome (diagnosed 11/2021, on warfarin), PE, HLD, MAC (diagnosed in 2018, on chronic ethambutol and azithromycin), previous prostate ca (diagnosed in 2011, s/p prostatectomy) who presented to the hospital on 04/28 with R-sided weakness. Stroke workup was negative, but imaging was consistent with a compressive epidural hematoma ni the cervical spine C3-C6. He underwent spinal decompression and fluid evacuation with NSGY on 04/28. Since admitted, the Hematology/Oncology service was consulted- recommended not resuming therapeutic anticoagulation for APLS given current bleeding event and starting ppx anticoagulation when deemed safe by nsgy. ID was also consulted for recommendations regarding whether the epidural fluid collection could be an infectious collection- recommended starting dapto until path returned. Hospital course has been complicated by shock with intermittent pressor requirements (off since 05/01) and waxing/waning encephalopathy. He was noted to have acute changes in mental status around 05/01 which prompted a workup- EEG was obtained without epileptiform changes but with evidence of diffuse background slowing. Medicine consulted for "medical management; APS previously on coumadin; post op delirium".      Overview/Hospital Course:  HM consulted for encephalopathy likely secondary to steroids and hospital delirium in setting of likely diminished neurocognitive function. Started on Seroquel qhs. Initially improved yesterday and slept well; however, with mild agitation this morning. Weaning steroids " and working on rehab placement.       Interval History: slept well overnight. At mental baseline this AM. No agitation yesterday or today     Review of Systems   Constitutional:  Positive for fatigue. Negative for activity change, appetite change, chills and fever.   HENT:  Negative for congestion, rhinorrhea and sore throat.    Respiratory:  Negative for cough, shortness of breath and wheezing.    Cardiovascular:  Negative for chest pain, palpitations and leg swelling.   Gastrointestinal:  Negative for abdominal pain, constipation, diarrhea, nausea and vomiting.   Genitourinary:  Negative for difficulty urinating, dysuria and hematuria.   Musculoskeletal:  Negative for back pain, myalgias and neck pain.   Skin:  Negative for rash.   Neurological:  Positive for weakness. Negative for headaches.   Psychiatric/Behavioral:  Negative for agitation and confusion.    Objective:     Vital Signs (Most Recent):  Temp: 97.8 °F (36.6 °C) (05/13/22 0733)  Pulse: 89 (05/13/22 0756)  Resp: 17 (05/13/22 0756)  BP: 122/67 (05/13/22 0733)  SpO2: (!) 94 % (05/13/22 0756)   Vital Signs (24h Range):  Temp:  [96.6 °F (35.9 °C)-98.1 °F (36.7 °C)] 97.8 °F (36.6 °C)  Pulse:  [56-89] 89  Resp:  [16-18] 17  SpO2:  [93 %-99 %] 94 %  BP: (100-122)/(56-67) 122/67     Weight: 74.8 kg (165 lb)  Body mass index is 24.37 kg/m².    Intake/Output Summary (Last 24 hours) at 5/13/2022 1048  Last data filed at 5/13/2022 0800  Gross per 24 hour   Intake --   Output 4400 ml   Net -4400 ml      Physical Exam  Constitutional:       General: He is not in acute distress.     Appearance: He is not ill-appearing or toxic-appearing.   HENT:      Head: Normocephalic and atraumatic.      Nose: No congestion or rhinorrhea.      Mouth/Throat:      Mouth: Mucous membranes are dry.      Pharynx: Oropharynx is clear.   Eyes:      General: No scleral icterus.     Extraocular Movements: Extraocular movements intact.      Conjunctiva/sclera: Conjunctivae normal.    Cardiovascular:      Rate and Rhythm: Normal rate and regular rhythm.      Pulses: Normal pulses.      Heart sounds: Normal heart sounds.   Pulmonary:      Effort: Pulmonary effort is normal.      Breath sounds: Normal breath sounds. No wheezing.   Abdominal:      General: Abdomen is flat. Bowel sounds are normal. There is no distension.      Palpations: Abdomen is soft.      Tenderness: There is no abdominal tenderness. There is no guarding or rebound.   Musculoskeletal:         General: No swelling.      Right lower leg: No edema.      Left lower leg: No edema.   Skin:     General: Skin is warm.      Coloration: Skin is not jaundiced.   Neurological:      General: No focal deficit present.      Mental Status: He is alert.      Comments: Oriented this AM, speech clear       Significant Labs: All pertinent labs within the past 24 hours have been reviewed.  CBC:   Recent Labs   Lab 05/12/22 0804 05/13/22  0811   WBC 15.46* 10.73   HGB 11.8* 11.9*   HCT 38.1* 37.9*    318     CMP:   Recent Labs   Lab 05/12/22 0804 05/12/22 2010 05/13/22  0811     144 138 140  141   K 4.2  --  4.3     --  107   CO2 25  --  22*   *  --  97   BUN 16  --  15   CREATININE 0.8  --  0.7   CALCIUM 9.3  --  9.1   PROT 7.0  --  6.5   ALBUMIN 3.4*  --  3.3*   BILITOT 0.5  --  0.5   ALKPHOS 93  --  98   AST 27  --  32   ALT 70*  --  66*   ANIONGAP 10  --  11   EGFRNONAA >60.0  --  >60.0       Significant Imaging: I have reviewed all pertinent imaging results/findings within the past 24 hours.      Assessment/Plan:        Acute encephalopathy  Mild cognitive impairment  --continue multimodal pain regimen and avoid sedatives as allowed  --follow infectious workup - NGTD  --continue daily miralax and senna, PRN lactulose  --urinary retention with bilateral hydroureteronephrosis, urology following. Catheter in place.  --restart seroquel as patient with combative behavior overnight   --delirium precautions  -- MRI  brain w/wo contrast without acute process. Chronic microvascular changes  -- Folic acid, thiamine and multivitamin  -- D/c depakote. LFT's increased after administration, now improving  -- Continue Seroquel 50 mg QHS. Sleeping better on this regimen and encephalopathy improving. avoid daytime naps. Frequent reorientation, and blinds open during day   -- Delirium precautions  -- Steroid taper         * Epidural hematoma  --continue management per primary  - primary team with plan for rehab and possible discharge today         Hypernatremia  Secondary to dehydration  Improved. Encourage orals        Impaired mobility  PT/OT following- recs rehab         History of pulmonary embolism  --hold therapeutic anticoagulation at this time, per Heme/Onc recs, given recent bleed           HILLARY (mycobacterium avium-intracellulare)  --continue ethambutol and azithromycin        Hyperlipidemia associated with type 2 diabetes mellitus  --hold statin given LFTs. Defer to resuming outpatient for long term benefit      Antiphospholipid syndrome  History of pulmonary embolism  --hold therapeutic anticoagulation at this time, per Heme/Onc recs, given recent bleed            VTE Risk Mitigation (From admission, onward)         Ordered     heparin (porcine) injection 5,000 Units  Every 8 hours         04/29/22 1704     Reason for No Pharmacological VTE Prophylaxis  Once        Question:  Reasons:  Answer:  Risk of Bleeding    04/28/22 2058     IP VTE HIGH RISK PATIENT  Once         04/28/22 2058     Place sequential compression device  Until discontinued         04/28/22 2058                  Niraj Khan MD  Department of Hospital Medicine   [facility] - Neurosurgery ([facility])

## 2022-05-13 NOTE — PLAN OF CARE
Problem: Adult Inpatient Plan of Care  Goal: Plan of Care Review  Outcome: Ongoing, Progressing     Problem: Adult Inpatient Plan of Care  Goal: Absence of Hospital-Acquired Illness or Injury  Outcome: Ongoing, Progressing     Problem: Urinary Elimination Impaired (Stroke, Ischemic/Transient Ischemic Attack)  Goal: Effective Urinary Elimination  Outcome: Ongoing, Progressing     Problem: Fall Injury Risk  Goal: Absence of Fall and Fall-Related Injury  Outcome: Ongoing, Progressing     Problem: Infection  Goal: Absence of Infection Signs and Symptoms  Outcome: Ongoing, Progressing     Problem: Pain (Stroke, Hemorrhagic)  Goal: Acceptable Pain Control  Outcome: Ongoing, Progressing    Plan of care reviewed with the patient and his wife. The patient's wife verbalized her understanding but the patient has tp be redirected and reoriented. The patient appears to be more alert  Today and has been attempting to feed himself and assist with his ADL's. No s/s of any respiratory distress noted. No s/s of hypo/hyperglycemia noted. The patient had a bowel movement and incontinent care was provided. The telesitter is in use. No voiced complaints or s/s of any pain or discomfort noted. The amaya is patent and draining yellow colored urine to the BSDB. Questions and concerns were addressed. The siderails are upX 3, the bed is locked and in it's lowest position, the bed alarm is on, and the call light is within reach Safety, Aspiration and Fall precautions are being maintained.No apparent distress noted. Will continue to monitor and report any changes

## 2022-05-13 NOTE — SUBJECTIVE & OBJECTIVE
Interval History: slept well overnight. At mental baseline this AM. No agitation yesterday or today     Review of Systems   Constitutional:  Positive for fatigue. Negative for activity change, appetite change, chills and fever.   HENT:  Negative for congestion, rhinorrhea and sore throat.    Respiratory:  Negative for cough, shortness of breath and wheezing.    Cardiovascular:  Negative for chest pain, palpitations and leg swelling.   Gastrointestinal:  Negative for abdominal pain, constipation, diarrhea, nausea and vomiting.   Genitourinary:  Negative for difficulty urinating, dysuria and hematuria.   Musculoskeletal:  Negative for back pain, myalgias and neck pain.   Skin:  Negative for rash.   Neurological:  Positive for weakness. Negative for headaches.   Psychiatric/Behavioral:  Negative for agitation and confusion.    Objective:     Vital Signs (Most Recent):  Temp: 97.8 °F (36.6 °C) (05/13/22 0733)  Pulse: 89 (05/13/22 0756)  Resp: 17 (05/13/22 0756)  BP: 122/67 (05/13/22 0733)  SpO2: (!) 94 % (05/13/22 0756)   Vital Signs (24h Range):  Temp:  [96.6 °F (35.9 °C)-98.1 °F (36.7 °C)] 97.8 °F (36.6 °C)  Pulse:  [56-89] 89  Resp:  [16-18] 17  SpO2:  [93 %-99 %] 94 %  BP: (100-122)/(56-67) 122/67     Weight: 74.8 kg (165 lb)  Body mass index is 24.37 kg/m².    Intake/Output Summary (Last 24 hours) at 5/13/2022 1048  Last data filed at 5/13/2022 0800  Gross per 24 hour   Intake --   Output 4400 ml   Net -4400 ml      Physical Exam  Constitutional:       General: He is not in acute distress.     Appearance: He is not ill-appearing or toxic-appearing.   HENT:      Head: Normocephalic and atraumatic.      Nose: No congestion or rhinorrhea.      Mouth/Throat:      Mouth: Mucous membranes are dry.      Pharynx: Oropharynx is clear.   Eyes:      General: No scleral icterus.     Extraocular Movements: Extraocular movements intact.      Conjunctiva/sclera: Conjunctivae normal.   Cardiovascular:      Rate and Rhythm: Normal  rate and regular rhythm.      Pulses: Normal pulses.      Heart sounds: Normal heart sounds.   Pulmonary:      Effort: Pulmonary effort is normal.      Breath sounds: Normal breath sounds. No wheezing.   Abdominal:      General: Abdomen is flat. Bowel sounds are normal. There is no distension.      Palpations: Abdomen is soft.      Tenderness: There is no abdominal tenderness. There is no guarding or rebound.   Musculoskeletal:         General: No swelling.      Right lower leg: No edema.      Left lower leg: No edema.   Skin:     General: Skin is warm.      Coloration: Skin is not jaundiced.   Neurological:      General: No focal deficit present.      Mental Status: He is alert.      Comments: Oriented this AM, speech clear       Significant Labs: All pertinent labs within the past 24 hours have been reviewed.  CBC:   Recent Labs   Lab 05/12/22 0804 05/13/22  0811   WBC 15.46* 10.73   HGB 11.8* 11.9*   HCT 38.1* 37.9*    318     CMP:   Recent Labs   Lab 05/12/22  0804 05/12/22 2010 05/13/22  0811     144 138 140  141   K 4.2  --  4.3     --  107   CO2 25  --  22*   *  --  97   BUN 16  --  15   CREATININE 0.8  --  0.7   CALCIUM 9.3  --  9.1   PROT 7.0  --  6.5   ALBUMIN 3.4*  --  3.3*   BILITOT 0.5  --  0.5   ALKPHOS 93  --  98   AST 27  --  32   ALT 70*  --  66*   ANIONGAP 10  --  11   EGFRNONAA >60.0  --  >60.0       Significant Imaging: I have reviewed all pertinent imaging results/findings within the past 24 hours.

## 2022-05-13 NOTE — PLAN OF CARE
Stretcher required for transport as patient has poor truncal stability     Elysia Dexter PA-C   [phone]  Neurosurgery  [facility]

## 2022-05-18 NOTE — PROGRESS NOTES
5/16/22 [facility] appointment [facility].   Addis/wife states patient is currently admitted at [facility] and she will call [facility] when he is discharged home.

## 2022-05-26 ENCOUNTER — NURSE TRIAGE (OUTPATIENT)
Dept: ADMINISTRATIVE | Facility: CLINIC | Age: 82
End: 2022-05-26
Payer: MEDICARE

## 2022-05-26 NOTE — TELEPHONE ENCOUNTER
Patient's wife is calling to discuss the plan for his urinary catheter, her main concern is that is has been in for a month and she wants to know when it will be removed.  I advised that would be a question for the urologist, patient had an appt with the urologist, but the pt's wife cancelled it because he is in a SNF and she didn't think he would be able to make it.  I advised that when in a SNF, the patient may see physicians, and the facility usually provides the transportation to those appts.  She states her son is on the list as the first contact at the facility and she doesn't know anything that is going on and she tries to call but they put her on hold and then hang up.  I advised that she needs to contact the facility and ask for the DON, if she is unsure what is going on or what care her  needs.  She continued to talk about how nobody knows anything and she can never reach anyone when she is there visiting.  I reiterated that she and her son should call the DON at [facility], if they are unclear or unsure what is going on with her 's care. I called to [facility] and s/w Toni, the nurse caring form Mr. Gunter, gave her the number to the scheduling dept and asked that she please reschedule the pt's Urology follow up appt.  She verbalized understanding.  Reason for Disposition   Nursing judgment    Additional Information   Negative: Nursing judgment   Negative: Nursing judgment   Negative: Nursing judgment    Protocols used: INFORMATION ONLY CALL - NO TRIAGE-A-OH

## 2022-06-08 ENCOUNTER — LAB VISIT (OUTPATIENT)
Dept: LAB | Facility: HOSPITAL | Age: 82
End: 2022-06-08
Payer: MEDICARE

## 2022-06-08 ENCOUNTER — OFFICE VISIT (OUTPATIENT)
Dept: UROLOGY | Facility: CLINIC | Age: 82
End: 2022-06-08
Payer: MEDICARE

## 2022-06-08 VITALS
HEIGHT: 69 IN | DIASTOLIC BLOOD PRESSURE: 56 MMHG | HEART RATE: 68 BPM | SYSTOLIC BLOOD PRESSURE: 100 MMHG | WEIGHT: 156.5 LBS | BODY MASS INDEX: 23.18 KG/M2

## 2022-06-08 DIAGNOSIS — C61 PROSTATE CANCER: ICD-10-CM

## 2022-06-08 DIAGNOSIS — G31.84 MCI (MILD COGNITIVE IMPAIRMENT): ICD-10-CM

## 2022-06-08 DIAGNOSIS — R33.9 URINARY RETENTION: ICD-10-CM

## 2022-06-08 DIAGNOSIS — Z98.890 H/O CERVICAL SPINE SURGERY: ICD-10-CM

## 2022-06-08 DIAGNOSIS — R33.9 URINARY RETENTION: Primary | ICD-10-CM

## 2022-06-08 DIAGNOSIS — Z90.79 HISTORY OF RADICAL PROSTATECTOMY: ICD-10-CM

## 2022-06-08 LAB
BILIRUB SERPL-MCNC: NORMAL MG/DL
BLOOD URINE, POC: NORMAL
CLARITY, POC UA: CLEAR
COLOR, POC UA: YELLOW
COMPLEXED PSA SERPL-MCNC: 0.02 NG/ML (ref 0–4)
CREAT SERPL-MCNC: 0.8 MG/DL (ref 0.5–1.4)
EST. GFR  (AFRICAN AMERICAN): >60 ML/MIN/1.73 M^2
EST. GFR  (NON AFRICAN AMERICAN): >60 ML/MIN/1.73 M^2
GLUCOSE UR QL STRIP: NORMAL
KETONES UR QL STRIP: NORMAL
LEUKOCYTE ESTERASE URINE, POC: NORMAL
NITRITE, POC UA: NORMAL
PH, POC UA: 5
PROTEIN, POC: NORMAL
SPECIFIC GRAVITY, POC UA: 1
UROBILINOGEN, POC UA: NORMAL

## 2022-06-08 PROCEDURE — 99999 PR PBB SHADOW E&M-EST. PATIENT-LVL IV: ICD-10-PCS | Mod: PBBFAC,,, | Performed by: NURSE PRACTITIONER

## 2022-06-08 PROCEDURE — 3074F SYST BP LT 130 MM HG: CPT | Mod: CPTII,S$GLB,, | Performed by: NURSE PRACTITIONER

## 2022-06-08 PROCEDURE — 3078F PR MOST RECENT DIASTOLIC BLOOD PRESSURE < 80 MM HG: ICD-10-PCS | Mod: CPTII,S$GLB,, | Performed by: NURSE PRACTITIONER

## 2022-06-08 PROCEDURE — 87086 URINE CULTURE/COLONY COUNT: CPT | Performed by: NURSE PRACTITIONER

## 2022-06-08 PROCEDURE — 84153 ASSAY OF PSA TOTAL: CPT | Performed by: NURSE PRACTITIONER

## 2022-06-08 PROCEDURE — 1100F PTFALLS ASSESS-DOCD GE2>/YR: CPT | Mod: CPTII,S$GLB,, | Performed by: NURSE PRACTITIONER

## 2022-06-08 PROCEDURE — 1111F PR DISCHARGE MEDS RECONCILED W/ CURRENT OUTPATIENT MED LIST: ICD-10-PCS | Mod: CPTII,S$GLB,, | Performed by: NURSE PRACTITIONER

## 2022-06-08 PROCEDURE — 1126F AMNT PAIN NOTED NONE PRSNT: CPT | Mod: CPTII,S$GLB,, | Performed by: NURSE PRACTITIONER

## 2022-06-08 PROCEDURE — 1157F PR ADVANCE CARE PLAN OR EQUIV PRESENT IN MEDICAL RECORD: ICD-10-PCS | Mod: CPTII,S$GLB,, | Performed by: NURSE PRACTITIONER

## 2022-06-08 PROCEDURE — 99999 PR PBB SHADOW E&M-EST. PATIENT-LVL IV: CPT | Mod: PBBFAC,,, | Performed by: NURSE PRACTITIONER

## 2022-06-08 PROCEDURE — 81002 URINALYSIS NONAUTO W/O SCOPE: CPT | Mod: S$GLB,,, | Performed by: NURSE PRACTITIONER

## 2022-06-08 PROCEDURE — 1126F PR PAIN SEVERITY QUANTIFIED, NO PAIN PRESENT: ICD-10-PCS | Mod: CPTII,S$GLB,, | Performed by: NURSE PRACTITIONER

## 2022-06-08 PROCEDURE — 1157F ADVNC CARE PLAN IN RCRD: CPT | Mod: CPTII,S$GLB,, | Performed by: NURSE PRACTITIONER

## 2022-06-08 PROCEDURE — 36415 COLL VENOUS BLD VENIPUNCTURE: CPT | Performed by: NURSE PRACTITIONER

## 2022-06-08 PROCEDURE — 99205 OFFICE O/P NEW HI 60 MIN: CPT | Mod: 25,S$GLB,, | Performed by: NURSE PRACTITIONER

## 2022-06-08 PROCEDURE — 99205 PR OFFICE/OUTPT VISIT, NEW, LEVL V, 60-74 MIN: ICD-10-PCS | Mod: 25,S$GLB,, | Performed by: NURSE PRACTITIONER

## 2022-06-08 PROCEDURE — 3074F PR MOST RECENT SYSTOLIC BLOOD PRESSURE < 130 MM HG: ICD-10-PCS | Mod: CPTII,S$GLB,, | Performed by: NURSE PRACTITIONER

## 2022-06-08 PROCEDURE — 51702 INSERT TEMP BLADDER CATH: CPT | Mod: S$GLB,,, | Performed by: NURSE PRACTITIONER

## 2022-06-08 PROCEDURE — 82565 ASSAY OF CREATININE: CPT | Performed by: NURSE PRACTITIONER

## 2022-06-08 PROCEDURE — 1111F DSCHRG MED/CURRENT MED MERGE: CPT | Mod: CPTII,S$GLB,, | Performed by: NURSE PRACTITIONER

## 2022-06-08 PROCEDURE — 1159F MED LIST DOCD IN RCRD: CPT | Mod: CPTII,S$GLB,, | Performed by: NURSE PRACTITIONER

## 2022-06-08 PROCEDURE — 3078F DIAST BP <80 MM HG: CPT | Mod: CPTII,S$GLB,, | Performed by: NURSE PRACTITIONER

## 2022-06-08 PROCEDURE — 3288F PR FALLS RISK ASSESSMENT DOCUMENTED: ICD-10-PCS | Mod: CPTII,S$GLB,, | Performed by: NURSE PRACTITIONER

## 2022-06-08 PROCEDURE — 1100F PR PT FALLS ASSESS DOC 2+ FALLS/FALL W/INJURY/YR: ICD-10-PCS | Mod: CPTII,S$GLB,, | Performed by: NURSE PRACTITIONER

## 2022-06-08 PROCEDURE — 51702 PR INSERTION OF TEMPORARY INDWELLING BLADDER CATHETER, SIMPLE: ICD-10-PCS | Mod: S$GLB,,, | Performed by: NURSE PRACTITIONER

## 2022-06-08 PROCEDURE — 1159F PR MEDICATION LIST DOCUMENTED IN MEDICAL RECORD: ICD-10-PCS | Mod: CPTII,S$GLB,, | Performed by: NURSE PRACTITIONER

## 2022-06-08 PROCEDURE — 3288F FALL RISK ASSESSMENT DOCD: CPT | Mod: CPTII,S$GLB,, | Performed by: NURSE PRACTITIONER

## 2022-06-08 PROCEDURE — 81002 POCT URINE DIPSTICK WITHOUT MICROSCOPE: ICD-10-PCS | Mod: S$GLB,,, | Performed by: NURSE PRACTITIONER

## 2022-06-08 NOTE — PROGRESS NOTES
CHIEF COMPLAINT:    Diego Gunter is a 81 y.o. male presents today for Urinary Retention    HISTORY OF PRESENTING ILLINESS:    Diego Gunter is a 81 y.o. male with PMH antiphospholipid syndrome on chronic coumadin for left PE, DM2, prostate ca s/p prostatectomy 2011, MAC pneumonia dx Fall 2018, mild cognitive impairment, and HLD    04/28/2022 He was transferred from OSH for compressive epidural hematoma in the cervical spine where he had surgery with Dr. Miller.    Posterior approach for a C3, C4, C5, and C6 laminectomy in medial facetectomy for decompression of spinal cord 2.   Removal epidural hematoma 3. C3-4, C4-5, C5-6 posterolateral instrumented fusion morselized allograft local autograft from laminectomy     05/04/2022 CTAP showed:  Kidneys are stable in size in the expected location.    -Massively dilated urinary bladder measuring 24 cm and resulting in moderate hydroureteronephrosis bilaterally.   -Focus of nondependent air in the urinary bladder    Urology consulted for b/l hydro. During this admission, he has had multiple catheter placements with high volume UOP. When speaking to him, he was hard to understand upon questioning. He has not had any urinary issues at home. No dysuria or hematuria. He does not have a history of retention.    He was to keep amaya cath in place; f/u in Physician clinic for VT; US of kidneys before;   He did not follow up.    Currently at [facility]; was followed by [facility].  A report given from 6/1/2022; states amaya had been out a couple of days.  Nursing staff reported urine dribbling; little output    He is here today with his family. Family reports that since he went to [facility], the follow up care was lost.   They will be taking him soon.            REVIEW OF SYSTEMS:  Review of Systems   Constitutional: Negative.  Negative for chills and fever.   Eyes: Negative for double vision.   Respiratory: Negative for cough and shortness of breath.     Cardiovascular: Negative for chest pain, palpitations and leg swelling.   Gastrointestinal: Negative for abdominal pain, constipation, diarrhea, nausea and vomiting.   Genitourinary: Positive for frequency. Negative for dysuria, flank pain and hematuria.        Urine is leaking  Weak FOS  (+) straining     Neurological: Negative for dizziness and headaches.         PATIENT HISTORY:    Past Medical History:   Diagnosis Date    Antiphospholipid syndrome 11/19/2021    Dysphagia     Hx of colonic polyps     followed by GI. Dr. Pham    Hyperlipidemia LDL goal <100     Overweight(278.02)     Prostate cancer september 2011    followed by urology, Dr. Rogers    Type II or unspecified type diabetes mellitus without mention of complication, not stated as uncontrolled     diet controlled       Past Surgical History:   Procedure Laterality Date    BRONCHOSCOPY N/A 10/15/2018    Procedure: BRONCHOSCOPY;  Surgeon: Pratibha Diagnostic Provider;  Location: [facility] OR [address] [place] [address];  Service: Anesthesiology;  Laterality: N/A;    CATARACT EXTRACTION, BILATERAL  2014    POSTERIOR CERVICAL LAMINECTOMY Bilateral 4/28/2022    Procedure: LAMINECTOMY, SPINE, CERVICAL, POSTERIOR APPROACH C3-6;  Surgeon: Carlos Miller MD;  Location: [facility] OR [address] [place] [address];  Service: Neurosurgery;  Laterality: Bilateral;    PROSTATECTOMY         Family History   Problem Relation Age of Onset    Colon cancer Mother     Diabetes Mother     Heart disease Father     Mental illness Sister     Diabetes Sister     Other Brother         polio as a child    Pulmonary fibrosis Brother     Diabetes Unknown     Diabetes Brother     Myasthenia gravis Brother     Parkinsonism Brother     Diabetes Brother     Dementia Brother     Lung cancer Brother         smoker    Diabetes Maternal Aunt        Social History     Socioeconomic History    Marital status:     Number of children: 2   Occupational History    Occupation: tool    Tobacco Use     Smoking status: Former Smoker     Packs/day: 0.25     Years: 5.00     Pack years: 1.25     Quit date: 10/2/1962     Years since quittin.7    Smokeless tobacco: Never Used    Tobacco comment: quit age 23   Substance and Sexual Activity    Alcohol use: Yes     Comment: beer occasionally    Drug use: No    Sexual activity: Yes     Partners: Female       Allergies:  Patient has no known allergies.    Medications:    Current Outpatient Medications:     albuterol (PROVENTIL/VENTOLIN HFA) 90 mcg/actuation inhaler, INHALE 2 PUFFS BY MOUTH EVERY 6 HOURS AS NEEDED FOR WHEEZING, Disp: 9 g, Rfl: 0    ascorbic acid, vitamin C, (VITAMIN C) 1000 MG tablet, Take 1,000 mg by mouth once daily., Disp: , Rfl:     azithromycin (ZITHROMAX) 500 MG tablet, Take 1 tablet (500 mg total) by mouth once daily., Disp: , Rfl:     b complex vitamins capsule, Take 1 capsule by mouth once daily., Disp: , Rfl:     ethambutoL (MYAMBUTOL) 400 MG Tab, Take 2 tablets (800 mg total) by mouth once daily., Disp: 60 tablet, Rfl: 5    LIDOcaine (LIDODERM) 5 %, Place 1 patch onto the skin once daily. Remove & Discard patch within 12 hours or as directed by MD, Disp: , Rfl: 0    melatonin (MELATIN) 3 mg tablet, Take 3 tablets (9 mg total) by mouth nightly., Disp: , Rfl: 0    mucus clearing device (AEROBIKA OSCILLATING PEP SYSTM), by Misc.(Non-Drug; Combo Route) route 2 (two) times a day., Disp: 1 Device, Rfl: 0    nebulizer and compressor (OMBRA COMPRESSOR SYSTEM) Marcy, use to administer nebulized medication as directed, Disp: 1 each, Rfl: 0    omeprazole 20 mg TbEC, 1/2 tablet daily in am as needed., Disp: 45 each, Rfl: 0    pravastatin (PRAVACHOL) 10 MG tablet, Take 1 tablet (10 mg total) by mouth once daily., Disp: 90 tablet, Rfl: 0    promethazine-dextromethorphan (PROMETHAZINE-DM) 6.25-15 mg/5 mL Syrp, Take 10-15ml by mouth every 8 hours as needed for coughing, Disp: 240 mL, Rfl: 0    QUEtiapine (SEROQUEL) 50 MG tablet, Take 1  tablet (50 mg total) by mouth every evening., Disp: 30 tablet, Rfl: 11    sodium chloride 3% 3 % nebulizer solution, USE 4 ML VIAL IN NEBULIZER  TWICE DAILY, Disp: 480 mL, Rfl: 11    PHYSICAL EXAMINATION:  Physical Exam  Vitals and nursing note reviewed.   Constitutional:       General: He is awake.      Appearance: Normal appearance.   HENT:      Head: Normocephalic.      Right Ear: External ear normal.      Left Ear: External ear normal.      Nose: Nose normal.   Neck:      Comments: Neck brace in place    Cardiovascular:      Rate and Rhythm: Normal rate.   Pulmonary:      Effort: Pulmonary effort is normal. No respiratory distress.   Abdominal:      General: There is distension.      Tenderness: There is no abdominal tenderness. There is no right CVA tenderness, left CVA tenderness or guarding.   Genitourinary:     Penis: Normal.       Testes: Normal.      Comments: Prostate is surgically absent    Musculoskeletal:         General: Normal range of motion.   Skin:     General: Skin is warm and dry.   Neurological:      General: No focal deficit present.      Mental Status: He is alert and oriented to person, place, and time.   Psychiatric:         Mood and Affect: Mood normal.         Behavior: Behavior is cooperative.           LABS:      In office UA today was clear of active infection/blood.    The PVR in the office today done immediately after urination by the nurse was >999    Lab Results   Component Value Date    PSA 0.02 10/28/2015    PSADIAG 0.02 06/08/2022             IMPRESSION:    Encounter Diagnoses   Name Primary?    Urinary retention Yes    Prostate cancer     History of radical prostatectomy     MCI (mild cognitive impairment)     H/O cervical spine surgery          Assessment:       1. Urinary retention    2. Prostate cancer    3. History of radical prostatectomy    4. MCI (mild cognitive impairment)    5. H/O cervical spine surgery        Plan:         I spent 60 minutes with the patient of  which more than half was spent in direct consultation with the patient in regards to our treatment and plan.  We addressed the office findings and recent labs.   Education and recommendations of today's plan of care including home remedies and needed follow up with PCP.   We discussed the chief complaint/LUTS and possible contributory factors.   Is urinary retention from recent neck/spine surgery or has it been chronic.  14 fr amaya was placed without difficulty.  2.4 liters was received slowly.   Due to findings will leave amaya in place for one month.  Canceling prescheduled renal US due to current retention; rescheduled 3-4 weeks to check for resolution of hydro.  Consults Dr. Foote for Urodynamics; will discuss appropiate time 6 weeks or 6 months.  Will discuss with family  Reassurance.

## 2022-06-10 LAB — BACTERIA UR CULT: NO GROWTH

## 2022-06-13 ENCOUNTER — TELEPHONE (OUTPATIENT)
Dept: FAMILY MEDICINE | Facility: CLINIC | Age: 82
End: 2022-06-13
Payer: MEDICARE

## 2022-06-13 NOTE — TELEPHONE ENCOUNTER
Spoke to patient wife and she sts that the patient just got of rehab. He had surgery on his spine and he has to follow up with his pcp within 7 days. The next available is not until 8/8.

## 2022-06-13 NOTE — TELEPHONE ENCOUNTER
----- Message from Ansley Tang sent at 6/13/2022 12:26 PM CDT -----  Type: Patient Call Back    Who called:Addis/ Wife    What is the request in detail: Pt is requesting a hospital f/u appt. Soonest appt is 8/8. Pt's wife would like to speak with a nurse. Please call    Can the clinic reply by [facility]? no    Would the patient rather a call back or a response via [facility]? Call back    Best call back number: [phone] (home)

## 2022-06-14 ENCOUNTER — OFFICE VISIT (OUTPATIENT)
Dept: FAMILY MEDICINE | Facility: CLINIC | Age: 82
End: 2022-06-14
Payer: MEDICARE

## 2022-06-14 ENCOUNTER — TELEPHONE (OUTPATIENT)
Dept: HEMATOLOGY/ONCOLOGY | Facility: CLINIC | Age: 82
End: 2022-06-14
Payer: MEDICARE

## 2022-06-14 ENCOUNTER — TELEPHONE (OUTPATIENT)
Dept: NEUROSURGERY | Facility: CLINIC | Age: 82
End: 2022-06-14
Payer: MEDICARE

## 2022-06-14 VITALS
BODY MASS INDEX: 23.8 KG/M2 | WEIGHT: 161.19 LBS | SYSTOLIC BLOOD PRESSURE: 110 MMHG | TEMPERATURE: 97 F | DIASTOLIC BLOOD PRESSURE: 60 MMHG | HEART RATE: 73 BPM | OXYGEN SATURATION: 97 %

## 2022-06-14 DIAGNOSIS — Z09 HOSPITAL DISCHARGE FOLLOW-UP: Primary | ICD-10-CM

## 2022-06-14 DIAGNOSIS — R33.9 URINARY RETENTION: ICD-10-CM

## 2022-06-14 DIAGNOSIS — I63.512 ACUTE ISCHEMIC LEFT MCA STROKE: ICD-10-CM

## 2022-06-14 DIAGNOSIS — I70.0 ATHEROSCLEROSIS OF AORTA: ICD-10-CM

## 2022-06-14 DIAGNOSIS — Z74.09 IMPAIRED MOBILITY: ICD-10-CM

## 2022-06-14 DIAGNOSIS — E11.69 HYPERLIPIDEMIA ASSOCIATED WITH TYPE 2 DIABETES MELLITUS: ICD-10-CM

## 2022-06-14 DIAGNOSIS — E78.5 HYPERLIPIDEMIA ASSOCIATED WITH TYPE 2 DIABETES MELLITUS: ICD-10-CM

## 2022-06-14 PROCEDURE — 3074F SYST BP LT 130 MM HG: CPT | Mod: CPTII,S$GLB,, | Performed by: NURSE PRACTITIONER

## 2022-06-14 PROCEDURE — 1157F ADVNC CARE PLAN IN RCRD: CPT | Mod: CPTII,S$GLB,, | Performed by: NURSE PRACTITIONER

## 2022-06-14 PROCEDURE — 1101F PT FALLS ASSESS-DOCD LE1/YR: CPT | Mod: CPTII,S$GLB,, | Performed by: NURSE PRACTITIONER

## 2022-06-14 PROCEDURE — 3078F DIAST BP <80 MM HG: CPT | Mod: CPTII,S$GLB,, | Performed by: NURSE PRACTITIONER

## 2022-06-14 PROCEDURE — 1126F PR PAIN SEVERITY QUANTIFIED, NO PAIN PRESENT: ICD-10-PCS | Mod: CPTII,S$GLB,, | Performed by: NURSE PRACTITIONER

## 2022-06-14 PROCEDURE — 1157F PR ADVANCE CARE PLAN OR EQUIV PRESENT IN MEDICAL RECORD: ICD-10-PCS | Mod: CPTII,S$GLB,, | Performed by: NURSE PRACTITIONER

## 2022-06-14 PROCEDURE — 99999 PR PBB SHADOW E&M-EST. PATIENT-LVL IV: ICD-10-PCS | Mod: PBBFAC,,, | Performed by: NURSE PRACTITIONER

## 2022-06-14 PROCEDURE — 1159F MED LIST DOCD IN RCRD: CPT | Mod: CPTII,S$GLB,, | Performed by: NURSE PRACTITIONER

## 2022-06-14 PROCEDURE — 1101F PR PT FALLS ASSESS DOC 0-1 FALLS W/OUT INJ PAST YR: ICD-10-PCS | Mod: CPTII,S$GLB,, | Performed by: NURSE PRACTITIONER

## 2022-06-14 PROCEDURE — 99499 UNLISTED E&M SERVICE: CPT | Mod: S$GLB,,, | Performed by: NURSE PRACTITIONER

## 2022-06-14 PROCEDURE — 99214 PR OFFICE/OUTPT VISIT, EST, LEVL IV, 30-39 MIN: ICD-10-PCS | Mod: S$GLB,,, | Performed by: NURSE PRACTITIONER

## 2022-06-14 PROCEDURE — 99499 RISK ADDL DX/OHS AUDIT: ICD-10-PCS | Mod: S$GLB,,, | Performed by: NURSE PRACTITIONER

## 2022-06-14 PROCEDURE — 1126F AMNT PAIN NOTED NONE PRSNT: CPT | Mod: CPTII,S$GLB,, | Performed by: NURSE PRACTITIONER

## 2022-06-14 PROCEDURE — 3078F PR MOST RECENT DIASTOLIC BLOOD PRESSURE < 80 MM HG: ICD-10-PCS | Mod: CPTII,S$GLB,, | Performed by: NURSE PRACTITIONER

## 2022-06-14 PROCEDURE — 3288F PR FALLS RISK ASSESSMENT DOCUMENTED: ICD-10-PCS | Mod: CPTII,S$GLB,, | Performed by: NURSE PRACTITIONER

## 2022-06-14 PROCEDURE — 1159F PR MEDICATION LIST DOCUMENTED IN MEDICAL RECORD: ICD-10-PCS | Mod: CPTII,S$GLB,, | Performed by: NURSE PRACTITIONER

## 2022-06-14 PROCEDURE — 99999 PR PBB SHADOW E&M-EST. PATIENT-LVL IV: CPT | Mod: PBBFAC,,, | Performed by: NURSE PRACTITIONER

## 2022-06-14 PROCEDURE — 99214 OFFICE O/P EST MOD 30 MIN: CPT | Mod: S$GLB,,, | Performed by: NURSE PRACTITIONER

## 2022-06-14 PROCEDURE — 3074F PR MOST RECENT SYSTOLIC BLOOD PRESSURE < 130 MM HG: ICD-10-PCS | Mod: CPTII,S$GLB,, | Performed by: NURSE PRACTITIONER

## 2022-06-14 PROCEDURE — 3288F FALL RISK ASSESSMENT DOCD: CPT | Mod: CPTII,S$GLB,, | Performed by: NURSE PRACTITIONER

## 2022-06-14 NOTE — PROGRESS NOTES
HPI     Chief Complaint:  Chief Complaint   Patient presents with    Follow-up       Diego Gunter is a 81 y.o. male with multiple medical diagnoses as listed in the medical history and problem list that presents for hospital follow up.  Pt is known to me with his his last appointment 3/11/2022.      Pt's spouse is present during encounter.     Recent hospital encounter. See below encounter note from 5/13/2022.    HPI:   Patient is a 82 y/o male with antiphospholipid syndrome on chronic coumadin, MAC pneumonia, HLD, and left PE diagnosed January 2021 who was transferred from  for compressive epidural hematoma in the cervical spine. Patient reports that he woke up in the middle of the night and could no move the right side of his body. He went to  ED and stroke code initiated. Patient was not given tPA but given ASA. CTA was negative for acute CVA but did show compressive fluid collective from C3-C6. MRI cervical spine confirmed. Patient was [place] [facility] [place]surgical evaluation. He reports he cannot move his right arm out of the contracted position, he also cannot lift his right leg off the bed. He reports the right arm and leg are painful. No complaints on left side of body. Denies b/b dysfunction.         Procedure(s) (LRB):  LAMINECTOMY, SPINE, CERVICAL, POSTERIOR APPROACH C3-6 (Bilateral)      Hospital Course: 4/30 pt s/p C3-6 PCF and epidural hematoma evac on 4/28. Naeon, continued delirium FU MRI C spine today for post op eval  5/1 naeon, mental status improving, moving all to command well. Postop MRI satisfactory decompression. Continue MAP through today, DC both drains.  5/2: NAEON. AFVSS. Subjective strength improvement. cvEEG negative for seizure.   5/3: Patient in restraints for agitation overnight. Calm and oriented x 3 this AM. Strength improving. No Bmx1 week, enema ordered. Leukocytosis, infectious work up pending. Medicine consulted, appreciate their assistance. Worried about  polypharmacy contributing to patient's mental status. Robaxin and Lyrica discontinued, scheduled tylenol and lidocaine patches ordered.   5/4: Patient with improvement in mental status. Family at bedside endorses improvement. Patient with BM s/p enema. Medicine following. BLE US, CXR, and EKG neg for acute processes.   5/5: Floyd replaced due to significant retention of ~2 L urine. Urology following. Patient neurologically stable. Aox3. Full strength. Tele sitter discontinued. Patient without restraints overnight. Final path on hematoma as blood clot - daptomycin discontinued. Blood pressure stable after fluids. Zosyn started for empiric coverage for gram neg and anaerobes. Appreciate assistance from multiple teams. Pending rehab.   5/6: AFVSS. Neuro stable. Respiratory cultures to be collected. Continue Zosyn. Pending rehab. Requires physician surveillance in setting of recent spinal surgery and urinary retention.   5/7: Agitation and confusion this AM. Restraints replaced as patient combative and struck sitter. Will wean restraints and encourage redirection from sitter and wife at bedside. Start nightly Seroquel. Strength stable.   5/8: Continue agitation and confusion continue nightly Seroquel. Pending IPR   5/9: Agitation and confusion refractory to depakote. PRN Seroquel added. Strength stable. F/u agitation/delerium workup. Telesitter at bedside.   5/10: NAEON. Patient awake and alert this AM, family reports good sleep overnight and note improved cognition. Motor exam stable. Incision with staples intact. Denied for IPR, pending SNF  5/11: Patient attempting to climb out of bed early this AM, redirectable. Weaning steroids, end date 5/13. Motor exam stable. Pending SNF  5/12: NAEON. Patient rested well overnight, AAOx2. Neuro exam stable. Na 144. Pending SNF  5/13: NAEON. Patient appears bright and wide awake this morning. AAOx3. Stable for discharge to SNF    he is compliant with medications daily without any  adverse side effects.    Patient Care Team:  Portia Ferreira MD as PCP - General (Internal Medicine)  Dustin Mccarthy III, MD as Consulting Physician (Orthopedic Surgery)  Pierre Rogers MD as Consulting Physician (Urology)  Burak Gurrola MD as Consulting Physician (Pulmonary Disease)  Theo Alvares MD as Consulting Physician (Infectious Diseases)  Suzan Glass LPN as Licensed Practical Nurse  Gilberto Silva OD as Consulting Physician (Optometry)  Thien Joy MD as Consulting Physician (Gastroenterology)  Yuan Lundberg MD as Consulting Physician (Cardiology)  Neel Jimenez MD as Consulting Physician (Neurology)    History     Past Medical History:  Past Medical History:   Diagnosis Date    Antiphospholipid syndrome 11/19/2021    Dysphagia     Hx of colonic polyps     followed by GI. Dr. Pham    Hyperlipidemia LDL goal <100     Overweight(278.02)     Prostate cancer september 2011    followed by urology, Dr. Rogers    Type II or unspecified type diabetes mellitus without mention of complication, not stated as uncontrolled     diet controlled       Past Surgical History:  Past Surgical History:   Procedure Laterality Date    BRONCHOSCOPY N/A 10/15/2018    Procedure: BRONCHOSCOPY;  Surgeon: [facility] Diagnostic Provider;  Location: [facility] OR [address] [place] [address];  Service: Anesthesiology;  Laterality: N/A;    CATARACT EXTRACTION, BILATERAL  2014    POSTERIOR CERVICAL LAMINECTOMY Bilateral 4/28/2022    Procedure: LAMINECTOMY, SPINE, CERVICAL, POSTERIOR APPROACH C3-6;  Surgeon: Carlos Miller MD;  Location: [facility] OR [address] [place] [address];  Service: Neurosurgery;  Laterality: Bilateral;    PROSTATECTOMY         Social History:  Social History     Socioeconomic History    Marital status:     Number of children: 2   Occupational History    Occupation: [employer]    Tobacco Use    Smoking status: Former Smoker     Packs/day: 0.25     Years: 5.00     Pack years: 1.25     Quit date:  10/2/1962     Years since quittin.7    Smokeless tobacco: Never Used    Tobacco comment: quit age 23   Substance and Sexual Activity    Alcohol use: Yes     Comment: beer occasionally    Drug use: No    Sexual activity: Yes     Partners: Female       Family History:  Family History   Problem Relation Age of Onset    Colon cancer Mother     Diabetes Mother     Heart disease Father     Mental illness Sister     Diabetes Sister     Other Brother         polio as a child    Pulmonary fibrosis Brother     Diabetes Unknown     Diabetes Brother     Myasthenia gravis Brother     Parkinsonism Brother     Diabetes Brother     Dementia Brother     Lung cancer Brother         smoker    Diabetes Maternal Aunt        Allergies and Medications: (updated and reviewed)  Review of patient's allergies indicates:  No Known Allergies  Current Outpatient Medications   Medication Sig Dispense Refill    albuterol (PROVENTIL/VENTOLIN HFA) 90 mcg/actuation inhaler INHALE 2 PUFFS BY MOUTH EVERY 6 HOURS AS NEEDED FOR WHEEZING 9 g 0    ascorbic acid, vitamin C, (VITAMIN C) 1000 MG tablet Take 1,000 mg by mouth once daily.      azithromycin (ZITHROMAX) 500 MG tablet Take 1 tablet (500 mg total) by mouth once daily.      b complex vitamins capsule Take 1 capsule by mouth once daily.      ethambutoL (MYAMBUTOL) 400 MG Tab Take 2 tablets (800 mg total) by mouth once daily. 60 tablet 5    LIDOcaine (LIDODERM) 5 % Place 1 patch onto the skin once daily. Remove & Discard patch within 12 hours or as directed by MD  0    melatonin (MELATIN) 3 mg tablet Take 3 tablets (9 mg total) by mouth nightly.  0    mucus clearing device (AEROBIKA OSCILLATING PEP SYSTM) by Misc.(Non-Drug; Combo Route) route 2 (two) times a day. 1 Device 0    nebulizer and compressor (OMBRA COMPRESSOR SYSTEM) Marcy use to administer nebulized medication as directed 1 each 0    omeprazole 20 mg TbEC 1/2 tablet daily in am as needed. 45 each 0     pravastatin (PRAVACHOL) 10 MG tablet Take 1 tablet (10 mg total) by mouth once daily. 90 tablet 0    promethazine-dextromethorphan (PROMETHAZINE-DM) 6.25-15 mg/5 mL Syrp Take 10-15ml by mouth every 8 hours as needed for coughing 240 mL 0    QUEtiapine (SEROQUEL) 50 MG tablet Take 1 tablet (50 mg total) by mouth every evening. 30 tablet 11    sodium chloride 3% 3 % nebulizer solution USE 4 ML VIAL IN NEBULIZER  TWICE DAILY 480 mL 11     No current facility-administered medications for this visit.       Exam     Review of Systems:  (as noted above)  Review of Systems   Constitutional: Negative for chills, fever and unexpected weight change.   HENT: Negative for sore throat and trouble swallowing.    Eyes: Negative for visual disturbance.   Respiratory: Negative for cough, chest tightness, shortness of breath and wheezing.    Cardiovascular: Negative for chest pain and palpitations.   Gastrointestinal: Negative for anal bleeding and blood in stool.   Endocrine: Negative for polydipsia and polyphagia.   Genitourinary: Negative for decreased urine volume and hematuria.   Musculoskeletal: Positive for gait problem.   Skin: Negative for rash and wound.   Neurological: Positive for weakness. Negative for seizures and speech difficulty.   Psychiatric/Behavioral: Negative for confusion, decreased concentration and suicidal ideas.       Physical Exam:   Physical Exam  Constitutional:       General: He is not in acute distress.     Appearance: He is not ill-appearing or diaphoretic.   HENT:      Head: Normocephalic and atraumatic.   Eyes:      General: No scleral icterus.     Pupils: Pupils are equal, round, and reactive to light.   Neck:      Vascular: No carotid bruit.   Cardiovascular:      Rate and Rhythm: Normal rate and regular rhythm.      Pulses: Normal pulses.      Heart sounds: No murmur heard.    No friction rub. No gallop.   Pulmonary:      Effort: No respiratory distress.      Breath sounds: No wheezing.    Chest:      Chest wall: No tenderness.   Genitourinary:     Comments: Floyd catheter  Musculoskeletal:         General: No signs of injury.      Cervical back: No rigidity or tenderness.   Lymphadenopathy:      Cervical: No cervical adenopathy.   Skin:     Capillary Refill: Capillary refill takes 2 to 3 seconds.      Findings: No rash.   Neurological:      Mental Status: He is alert and oriented to person, place, and time.      GCS: GCS eye subscore is 4. GCS verbal subscore is 5. GCS motor subscore is 6.      Motor: Weakness (RUE, RLE) present.      Gait: Gait abnormal.       Vitals:    06/14/22 1646   BP: 110/60   BP Location: Right arm   Pulse: 73   Temp: 97.4 °F (36.3 °C)   TempSrc: Oral   SpO2: 97%   Weight: 73.1 kg (161 lb 2.5 oz)      Body mass index is 23.8 kg/m².    Assessment & Plan     Problem List Items Addressed This Visit        Neuro    Acute ischemic left MCA stroke    Current Assessment & Plan     Follow up with neurology              Cardiac/Vascular    Atherosclerosis of aorta    Overview     - noted on CT chest 9/2018           Current Assessment & Plan     -assessed and addressed all modifiable risk factors.  Continue with appropriate management to prevent complications with regards to lipid and BP monitoring.             Hyperlipidemia associated with type 2 diabetes mellitus    Current Assessment & Plan     discussed ways to lower triglycerides such as cutting simple sugars out of diet (white breads, candies, cookies, cakes, etc.) and reducing/eliminating intake of highly processed trans fatty acids.   Exercise 30 minutes a day for 4-5 days a week.   Eat more fiber.                  Renal/    Urinary retention    Current Assessment & Plan     Floyd in place. Pt denies pain.     The current medical regimen is effective;  continue present plan                Other    Impaired mobility    Current Assessment & Plan     Using walker    Continue working with PT/OT             Other Visit  Diagnoses     Hospital discharge follow-up    -  Primary    Hospital encounter notes, objective/subjective data, diagnostics, and plan of care from 5/13 reviewed.    Overall pt states he is feeling better since d/c from rehab facility.     He is still requiring use of amaya due to urinary retention.     Continues to experience RUE and RLE deficits but he continues to participate in PT/OT with gradual improvement. Using walker.     Pt is requesting to restart Coumadin he has not taken Coumadin since hospital stay. Per chart review neurology has cleared pt to restart coumadin however they prefer HEM/ONC prescribe initial dose. Pt's current HEM/ONC provider is SHAHRAM. This provider will discuss starting dose with pt's PCP.     Discussed condition, and treatment     ED precautions given.   Notify provider if symptoms do not resolve or increase in severity.   Patient verbalizes understanding and agrees with plan of care.              --------------------------------------------      Health Maintenance:  Health Maintenance       Date Due Completion Date    Pneumococcal Vaccines (Age 65+) (3 - PPSV23 or PCV20) 07/12/2018 10/28/2015    COVID-19 Vaccine (4 - Booster for Pfizer series) 03/05/2022 11/5/2021    Hemoglobin A1c 10/28/2022 4/28/2022    Lipid Panel 04/28/2023 4/28/2022    TETANUS VACCINE 03/08/2025 3/8/2015          Health maintenance reviewed.Vaccines offered patient declined at this time     Follow Up:  Follow up in about 2 weeks (around 6/28/2022), or if symptoms worsen or fail to improve.      The patient expressed understanding and no barriers to adherence were identified.      - The patient indicates understanding of these issues and agrees with the plan. Brief care plan is updated and reviewed with the patient as applicable.      - The patient is given an After Visit Summary that lists all medications with directions, allergies, education, orders placed during this encounter and follow-up instructions.      - I  have reviewed the patient's medical information including past medical, family, and social history sections including the medications and allergies.      - We discussed the patient's current medications.     This note was created by combination of typed  and MModal dictation.  Transcription errors may be present.  If there are any questions, please contact me.       Zachariah Bedoya NP

## 2022-06-14 NOTE — Clinical Note
Good afternoon Dr. Ferreira,   I hate to bother you but I am not sure how to correctly answer this question. Pt is requesting to restart Coumadin he has not taken Coumadin since hospital stay. Per chart review neurology has cleared pt to restart coumadin however they prefer HEM/ONC prescribe initial dose. Pt's current HEM/ONC provider is OOO. Would you like to restart the pt's coumadin? Thank you.

## 2022-06-14 NOTE — PROGRESS NOTES
6/14/22  Wife called to report the Patient was in the hospital from 4/28 -5/13 due to a hematoma in his spine, [phone], discharged from [facility] 6/13, reports warfarin was stopped while in the hospital but wife not sure if he was on any other anti coagulation medicine, wife is concerned as to whether he should be on warfarin or not, wife's call back # [phone]

## 2022-06-14 NOTE — PROGRESS NOTES
Pt admitted for epidural hematoma. He will have to get clearance from Neurosurgery on when he can resume warfarin. I will send Dr. Miller a message. Please advise wife she can also contact his office.    Update 6/14: Pt cleared from neurosurgeons office ok to resume.

## 2022-06-14 NOTE — ASSESSMENT & PLAN NOTE
Floyd in place. Pt denies pain.     The current medical regimen is effective;  continue present plan

## 2022-06-14 NOTE — TELEPHONE ENCOUNTER
Patient is s/p C3-6 posterior fusion 04/28/2022, he can resume coumadin from a neurosurgical standpoint but will need to contact the ordering provider on instructions to do so. I advised he needs to continue to wear the collar during movement, can take off if watching TV on the sofa or when sleeping. They have a follow up on 07/05 and that will determine how much longer to wear if any.

## 2022-06-14 NOTE — TELEPHONE ENCOUNTER
----- Message from Jonnie Gonzales sent at 6/14/2022 12:13 PM CDT -----  Contact: Addis ( spouse ) @ [phone]  Caller requesting a return phone call about resuming his ( warfarin (COUMADIN) 3 MG tablet  ) and instruction on wearing the neck brace, Please return call to discuss further

## 2022-06-14 NOTE — TELEPHONE ENCOUNTER
To Dr Marcos Ryan afternoon  I am Zoie Burks RN for the [facility] Hem[facility] office, This patient was last see by Joaquín Pantoja MD Hem/Onc in November 2021 for  a coumadin check   The above referenced patient had  emergency surgery at [facility] on 4-28-22 for a compression epidural hematoma  He was hospitalized at [facility] until 5-13-22 .At that time he was discharged to a skilled facility called [facility]  The spouse just contacted me and asked when should this gentleman re start his Coumadin . She asked this question at the [facility] facility but no one  addressed it there nor did he receive any Coumadin at that facility.  He is now discharged from there.   In the past  he has been followed by the Coumadin clinic   She contacted them and they informed her that they will resume his care but need an order form a physician as to the appropriate dosing .  Prior to the hospitalization he received a range of 3 mg-4.5 mg daily according to his INR results.  Do you think you can offer some guidance as to what dose this patient needs to resume or if in your opinion he needs to resume at this time  The spouse contacted the neurosurgeon and per her he deferred the answer to this question to Hem/Onc  Dr Pantoja is currently on vacation. I saw in the patient's hospital chart that you were the Hem/Onc that did a clinical review while he was hospitalized. Any assistance you can offer with helping this patient to resolve this matter would be greatly appreciated or advise as to whom I should direct this question to   Your time is greatly appreciated   Zoie Burks RN    Above message sent to Ti Bedoya NP today  thank you I have seen him today and we have come up with a plan from Ti Bedoya NP

## 2022-06-14 NOTE — TELEPHONE ENCOUNTER
Rec'd call from wife regarding patient Warfarin/Coumadin. Patient has been hospitalized for several months and had emergency Spinal Hematoma surgery April of 2022. Wife would like to speak with Nurse Navigator SHARON Ralph. Wife was assured that a message will be sent for SHARON Ralph to give her a call regarding the patient.

## 2022-06-14 NOTE — TELEPHONE ENCOUNTER
Spoke to wife, Addis, scheduled pt for a HFU with NP Zachariah Bedoya today at 5pm. Patient's wife verbally understood.

## 2022-06-15 ENCOUNTER — OFFICE VISIT (OUTPATIENT)
Dept: INFECTIOUS DISEASES | Facility: CLINIC | Age: 82
End: 2022-06-15
Payer: MEDICARE

## 2022-06-15 ENCOUNTER — TELEPHONE (OUTPATIENT)
Dept: FAMILY MEDICINE | Facility: CLINIC | Age: 82
End: 2022-06-15
Payer: MEDICARE

## 2022-06-15 ENCOUNTER — TELEPHONE (OUTPATIENT)
Dept: UROLOGY | Facility: CLINIC | Age: 82
End: 2022-06-15
Payer: MEDICARE

## 2022-06-15 ENCOUNTER — ANTI-COAG VISIT (OUTPATIENT)
Dept: CARDIOLOGY | Facility: CLINIC | Age: 82
End: 2022-06-15
Payer: MEDICARE

## 2022-06-15 VITALS
DIASTOLIC BLOOD PRESSURE: 72 MMHG | HEIGHT: 69 IN | BODY MASS INDEX: 23.71 KG/M2 | OXYGEN SATURATION: 97 % | WEIGHT: 160.06 LBS | HEART RATE: 79 BPM | SYSTOLIC BLOOD PRESSURE: 113 MMHG

## 2022-06-15 DIAGNOSIS — A31.0 MYCOBACTERIUM AVIUM-INTRACELLULARE COMPLEX: Primary | ICD-10-CM

## 2022-06-15 DIAGNOSIS — D68.61 ANTIPHOSPHOLIPID SYNDROME: ICD-10-CM

## 2022-06-15 DIAGNOSIS — Z86.711 HISTORY OF PULMONARY EMBOLISM: Primary | ICD-10-CM

## 2022-06-15 PROCEDURE — 3288F PR FALLS RISK ASSESSMENT DOCUMENTED: ICD-10-PCS | Mod: CPTII,S$GLB,, | Performed by: INTERNAL MEDICINE

## 2022-06-15 PROCEDURE — 99499 UNLISTED E&M SERVICE: CPT | Mod: S$GLB,,, | Performed by: INTERNAL MEDICINE

## 2022-06-15 PROCEDURE — 1157F ADVNC CARE PLAN IN RCRD: CPT | Mod: CPTII,S$GLB,, | Performed by: INTERNAL MEDICINE

## 2022-06-15 PROCEDURE — 3078F DIAST BP <80 MM HG: CPT | Mod: CPTII,S$GLB,, | Performed by: INTERNAL MEDICINE

## 2022-06-15 PROCEDURE — 1125F AMNT PAIN NOTED PAIN PRSNT: CPT | Mod: CPTII,S$GLB,, | Performed by: INTERNAL MEDICINE

## 2022-06-15 PROCEDURE — 1101F PR PT FALLS ASSESS DOC 0-1 FALLS W/OUT INJ PAST YR: ICD-10-PCS | Mod: CPTII,S$GLB,, | Performed by: INTERNAL MEDICINE

## 2022-06-15 PROCEDURE — 3288F FALL RISK ASSESSMENT DOCD: CPT | Mod: CPTII,S$GLB,, | Performed by: INTERNAL MEDICINE

## 2022-06-15 PROCEDURE — 99499 RISK ADDL DX/OHS AUDIT: ICD-10-PCS | Mod: S$GLB,,, | Performed by: INTERNAL MEDICINE

## 2022-06-15 PROCEDURE — 1159F MED LIST DOCD IN RCRD: CPT | Mod: CPTII,S$GLB,, | Performed by: INTERNAL MEDICINE

## 2022-06-15 PROCEDURE — 1101F PT FALLS ASSESS-DOCD LE1/YR: CPT | Mod: CPTII,S$GLB,, | Performed by: INTERNAL MEDICINE

## 2022-06-15 PROCEDURE — 1159F PR MEDICATION LIST DOCUMENTED IN MEDICAL RECORD: ICD-10-PCS | Mod: CPTII,S$GLB,, | Performed by: INTERNAL MEDICINE

## 2022-06-15 PROCEDURE — 3078F PR MOST RECENT DIASTOLIC BLOOD PRESSURE < 80 MM HG: ICD-10-PCS | Mod: CPTII,S$GLB,, | Performed by: INTERNAL MEDICINE

## 2022-06-15 PROCEDURE — 1125F PR PAIN SEVERITY QUANTIFIED, PAIN PRESENT: ICD-10-PCS | Mod: CPTII,S$GLB,, | Performed by: INTERNAL MEDICINE

## 2022-06-15 PROCEDURE — 99214 OFFICE O/P EST MOD 30 MIN: CPT | Mod: S$GLB,,, | Performed by: INTERNAL MEDICINE

## 2022-06-15 PROCEDURE — 3074F PR MOST RECENT SYSTOLIC BLOOD PRESSURE < 130 MM HG: ICD-10-PCS | Mod: CPTII,S$GLB,, | Performed by: INTERNAL MEDICINE

## 2022-06-15 PROCEDURE — 1157F PR ADVANCE CARE PLAN OR EQUIV PRESENT IN MEDICAL RECORD: ICD-10-PCS | Mod: CPTII,S$GLB,, | Performed by: INTERNAL MEDICINE

## 2022-06-15 PROCEDURE — 99999 PR PBB SHADOW E&M-EST. PATIENT-LVL IV: CPT | Mod: PBBFAC,,,

## 2022-06-15 PROCEDURE — 99214 PR OFFICE/OUTPT VISIT, EST, LEVL IV, 30-39 MIN: ICD-10-PCS | Mod: S$GLB,,, | Performed by: INTERNAL MEDICINE

## 2022-06-15 PROCEDURE — 3074F SYST BP LT 130 MM HG: CPT | Mod: CPTII,S$GLB,, | Performed by: INTERNAL MEDICINE

## 2022-06-15 PROCEDURE — 99999 PR PBB SHADOW E&M-EST. PATIENT-LVL IV: ICD-10-PCS | Mod: PBBFAC,,,

## 2022-06-15 RX ORDER — WARFARIN 3 MG/1
3 TABLET ORAL DAILY
Qty: 30 TABLET | Refills: 1 | Status: SHIPPED | OUTPATIENT
Start: 2022-06-15 | End: 2022-06-15

## 2022-06-15 RX ORDER — WARFARIN 3 MG/1
3-4.5 TABLET ORAL DAILY
Qty: 45 TABLET | Refills: 11 | Status: SHIPPED | OUTPATIENT
Start: 2022-06-15 | End: 2022-07-22

## 2022-06-15 NOTE — TELEPHONE ENCOUNTER
Called pt and his spouse. Restarted coumadin at 3 mg. Called coumadin clinic and re enrolled pt in program. Patient verbalizes understanding and agrees with plan of care.      ----- Message from Portia Ferreira MD sent at 6/14/2022  7:44 PM CDT -----  Let's start him back on 3 mg and ask the coumadin clinic to re-enroll him with same goal as previous.  Thanks!  ----- Message -----  From: Zachariah Bedoya NP  Sent: 6/14/2022   5:54 PM CDT  To: Portia Ferreira MD    Good afternoon Dr. Ferreira,     I hate to bother you but I am not sure how to correctly answer this question. Pt is requesting to restart Coumadin he has not taken Coumadin since hospital stay. Per chart review neurology has cleared pt to restart coumadin however they prefer HEM/ONC prescribe initial dose. Pt's current HEM/ONC provider is [facility]. Would you like to restart the pt's coumadin? Thank you.

## 2022-06-15 NOTE — PROGRESS NOTES
Pt received clearance to resume warfarin from [facility]. We will start back at last planned dose. Follow closely until stable. Remind to resume greens and be consistent with plan - greens 3x/week. Send back if diet will not be resumed as previously planned.

## 2022-06-15 NOTE — TELEPHONE ENCOUNTER
Returned patient's call she was concerned that her  had blood in his catheter bag instructed he to increase his fluid intake and urine should clear up. She replied his urine looks  Like it's clearing up already, I gave her a direct number to call if she have anymore concerns.

## 2022-06-15 NOTE — PROGRESS NOTES
6/15/22  Ti Bedoya NP called to get Patient re enrolled in Coumadin Clinic, stated phone note dated 6/14 from Chata Montgomery RN stated he can resume coumadin from a neurosurgical standpoint but will need to contact the ordering provider on instructions to do so, and per Dr Ferreira Patient to restarted warfarin at 3mg

## 2022-06-15 NOTE — PROGRESS NOTES
[facility]  06/15/2022 10:23 AM    Subjective:      Chief Complaint:   Chief Complaint   Patient presents with    MAC       History of Present Illness:    Patient Diego Gunter is a 81 y.o. male  with h/o emphysema and cavitary MAC infection and antiphospholipid antibody syndrome on coumadin seen in clinic for MAC f/u.    Since last appt had prolonged admission at [facility] and then rehab for Epidural hematoma- presented with R sided weakness found to have c3-6 fluid collection s/p spinal decompression and fluid evacuation with NSY 4/28. Path c/w clot/thrombus    Reports compliance with azithro/ethambutol and aerobika and inhaled hypertonic saline during hospitalization and at home, but says that he didn't receive all his medications while at rehab (thinks he only got the ethambutol [place] pill and not the azithro). Reports cough now gone and denies phlegm. Working with therapy re: R hand contractures and weakness. Compliant with neck brace. Has upcoming appt with [facility] for f/u           Labs 1/13/22- normal lft     Takes prilosec for reflux.     Quit smoking 57 y ago.     Had bronch 10/2018 for work up of lung mass     BRONCHIAL WASH CYTOLOGY:  NEGATIVE EXAM-NO NEOPLASIA IDENTIFIED  NO ORGANISMS IDENTIFIED WITH A GMS STAIN  THE CONTROL STAINED APPROPRIATEL        Organism     MYCOBACTERIUM AVIUM COMPLEX   Antibiotic    TED (mcg/mL)  Interpretation   ----------------------------------------------------------   Moxifloxacin             2       I   Clarithromycin           1       S   Amikacin                64   Streptomycin           >64   Linezolid               64       R   Ethambutol              16   Rifampin                 8   Rifabutin           <=0.25         AFB cultures 10/7/21, 10/28/21, 11/18/21, 12/9/21 all positive MAC     CT 11/2021  1. Continued evolution of previously described right upper lobe cavitary lesion measuring approximately 3.5 x 4.3 x 5.7-cm (previously 3.3 x 4.4 x 5.1-cm).  Similar to minimal interval decrease in the amount of surrounding wall thickness, consolidation and surrounding scattered small consolidative multifocal airspace opacities.  Mediastinal lymphadenopathy, not significantly changed.  2. Symmetric moderate-severe centrilobular emphysematous changes with basoapical gradient, not significantly changed.      Review of Symptoms:  Constitutional: Denies fevers, chills, or weakness.  ENT: Denies dysphagia, nasal discharge, ear pain or discharge.  Cardiovascular: Denies chest pain, palpitations, orthopnea, or claudication.  Respiratory: Denies shortness of breath, cough, hemoptysis, or wheezing.  GI: Denies nausea/vomitting, hematochezia, melena, abd pain, or changes in appetite.  : Denies dysuria, incontinence, or hematuria.  Musculoskeletal: Denies joint pain or myalgias.  Skin/breast: Denies rashes, lumps, lesions, or discharge.  Neurologic: Denies headache, dizziness, vertigo, or paresthesias.    Past Medical History:   Diagnosis Date    Antiphospholipid syndrome 11/19/2021    Dysphagia     Hx of colonic polyps     followed by GI. Dr. Pham    Hyperlipidemia LDL goal <100     Overweight(278.02)     Prostate cancer september 2011    followed by urology, Dr. Rogers    Type II or unspecified type diabetes mellitus without mention of complication, not stated as uncontrolled     diet controlled       Past Surgical History:   Procedure Laterality Date    BRONCHOSCOPY N/A 10/15/2018    Procedure: BRONCHOSCOPY;  Surgeon: Pratibha Diagnostic Provider;  Location: [facility] OR [address] [place] [address];  Service: Anesthesiology;  Laterality: N/A;    CATARACT EXTRACTION, BILATERAL  2014    POSTERIOR CERVICAL LAMINECTOMY Bilateral 4/28/2022    Procedure: LAMINECTOMY, SPINE, CERVICAL, POSTERIOR APPROACH C3-6;  Surgeon: Carlos Miller MD;  Location: [facility] OR [address] [place] [address];  Service: Neurosurgery;  Laterality: Bilateral;    PROSTATECTOMY         Family History   Problem Relation Age of Onset    Colon  cancer Mother     Diabetes Mother     Heart disease Father     Mental illness Sister     Diabetes Sister     Other Brother         polio as a child    Pulmonary fibrosis Brother     Diabetes Unknown     Diabetes Brother     Myasthenia gravis Brother     Parkinsonism Brother     Diabetes Brother     Dementia Brother     Lung cancer Brother         smoker    Diabetes Maternal Aunt        Social History     Socioeconomic History    Marital status:     Number of children: 2   Occupational History    Occupation: [employer]    Tobacco Use    Smoking status: Former Smoker     Packs/day: 0.25     Years: 5.00     Pack years: 1.25     Quit date: 10/2/1962     Years since quittin.7    Smokeless tobacco: Former User    Tobacco comment: quit age 23   Substance and Sexual Activity    Alcohol use: Yes     Comment: beer occasionally    Drug use: No    Sexual activity: Yes     Partners: Female       Review of patient's allergies indicates:  No Known Allergies      Objective:   VS (24h):   Vitals:    06/15/22 0912   BP: 113/72   Pulse: 79     [unfilled]  General: Afebrile, alert, comfortable, no acute distress.   HEENT: TERELL. EOMI, no scleral icterus.   Pulmonary: Non labored,clear to auscultation A/P/L. No wheezing, crackles, or rhonchi.  Cardiac: normal S1 & S2 w/o rubs/murmurs/gallops.   Abdominal: Non-tender, non-distended.  Extremities: Moves all extremities x 4. No peripheral edema.  Skin: No jaundice, rashes, or visible lesions.   Neurological:  Alert and oriented x 4.     Labs:  Glucose   Date Value Ref Range Status   2022 97 70 - 110 mg/dL Final   2022 132 (H) 70 - 110 mg/dL Final   2022 144 (H) 70 - 110 mg/dL Final     Calcium   Date Value Ref Range Status   2022 9.1 8.7 - 10.5 mg/dL Final   2022 9.3 8.7 - 10.5 mg/dL Final   2022 9.4 8.7 - 10.5 mg/dL Final     Albumin   Date Value Ref Range Status   2022 3.3 (L) 3.5 - 5.2 g/dL Final    05/12/2022 3.4 (L) 3.5 - 5.2 g/dL Final   05/11/2022 3.6 3.5 - 5.2 g/dL Final     Total Protein   Date Value Ref Range Status   05/13/2022 6.5 6.0 - 8.4 g/dL Final   05/12/2022 7.0 6.0 - 8.4 g/dL Final   05/11/2022 6.6 6.0 - 8.4 g/dL Final     Sodium   Date Value Ref Range Status   05/13/2022 140 136 - 145 mmol/L Final   05/13/2022 141 136 - 145 mmol/L Final   05/12/2022 138 136 - 145 mmol/L Final     Potassium   Date Value Ref Range Status   05/13/2022 4.3 3.5 - 5.1 mmol/L Final   05/12/2022 4.2 3.5 - 5.1 mmol/L Final   05/11/2022 4.6 3.5 - 5.1 mmol/L Final     CO2   Date Value Ref Range Status   05/13/2022 22 (L) 23 - 29 mmol/L Final   05/12/2022 25 23 - 29 mmol/L Final   05/11/2022 26 23 - 29 mmol/L Final     Chloride   Date Value Ref Range Status   05/13/2022 107 95 - 110 mmol/L Final   05/12/2022 107 95 - 110 mmol/L Final   05/11/2022 114 (H) 95 - 110 mmol/L Final     BUN   Date Value Ref Range Status   05/13/2022 15 8 - 23 mg/dL Final   05/12/2022 16 8 - 23 mg/dL Final   05/11/2022 15 8 - 23 mg/dL Final     Creatinine   Date Value Ref Range Status   06/08/2022 0.8 0.5 - 1.4 mg/dL Final   05/13/2022 0.7 0.5 - 1.4 mg/dL Final   05/12/2022 0.8 0.5 - 1.4 mg/dL Final     Alkaline Phosphatase   Date Value Ref Range Status   05/13/2022 98 55 - 135 U/L Final   05/12/2022 93 55 - 135 U/L Final   05/11/2022 111 55 - 135 U/L Final     ALT   Date Value Ref Range Status   05/13/2022 66 (H) 10 - 44 U/L Final   05/12/2022 70 (H) 10 - 44 U/L Final   05/11/2022 90 (H) 10 - 44 U/L Final     AST   Date Value Ref Range Status   05/13/2022 32 10 - 40 U/L Final   05/12/2022 27 10 - 40 U/L Final   05/11/2022 45 (H) 10 - 40 U/L Final     Total Bilirubin   Date Value Ref Range Status   05/13/2022 0.5 0.1 - 1.0 mg/dL Final     Comment:     For infants and newborns, interpretation of results should be based  on gestational age, weight and in agreement with clinical  observations.    Premature Infant recommended reference ranges:  Up  to 24 hours.............<8.0 mg/dL  Up to 48 hours............<12.0 mg/dL  3-5 days..................<15.0 mg/dL  6-29 days.................<15.0 mg/dL     05/12/2022 0.5 0.1 - 1.0 mg/dL Final     Comment:     For infants and newborns, interpretation of results should be based  on gestational age, weight and in agreement with clinical  observations.    Premature Infant recommended reference ranges:  Up to 24 hours.............<8.0 mg/dL  Up to 48 hours............<12.0 mg/dL  3-5 days..................<15.0 mg/dL  6-29 days.................<15.0 mg/dL     05/11/2022 0.5 0.1 - 1.0 mg/dL Final     Comment:     For infants and newborns, interpretation of results should be based  on gestational age, weight and in agreement with clinical  observations.    Premature Infant recommended reference ranges:  Up to 24 hours.............<8.0 mg/dL  Up to 48 hours............<12.0 mg/dL  3-5 days..................<15.0 mg/dL  6-29 days.................<15.0 mg/dL       WBC   Date Value Ref Range Status   05/13/2022 10.73 3.90 - 12.70 K/uL Final   05/12/2022 15.46 (H) 3.90 - 12.70 K/uL Final   05/11/2022 18.43 (H) 3.90 - 12.70 K/uL Final     Hemoglobin   Date Value Ref Range Status   05/13/2022 11.9 (L) 14.0 - 18.0 g/dL Final   05/12/2022 11.8 (L) 14.0 - 18.0 g/dL Final   05/11/2022 12.1 (L) 14.0 - 18.0 g/dL Final     POC Hematocrit   Date Value Ref Range Status   04/28/2022 34 (L) 36 - 54 %PCV Final   04/28/2022 42 36 - 54 %PCV Final     Hematocrit   Date Value Ref Range Status   05/13/2022 37.9 (L) 40.0 - 54.0 % Final   05/12/2022 38.1 (L) 40.0 - 54.0 % Final   05/11/2022 37.2 (L) 40.0 - 54.0 % Final     MCV   Date Value Ref Range Status   05/13/2022 90 82 - 98 fL Final   05/12/2022 91 82 - 98 fL Final   05/11/2022 86 82 - 98 fL Final     Platelets   Date Value Ref Range Status   05/13/2022 318 150 - 450 K/uL Final   05/12/2022 332 150 - 450 K/uL Final   05/11/2022 304 150 - 450 K/uL Final     Lab Results   Component Value Date     CHOL 116 (L) 04/28/2022    CHOL 116 (L) 03/07/2022    CHOL 127 02/25/2021     Lab Results   Component Value Date    HDL 36 (L) 04/28/2022    HDL 43 03/07/2022    HDL 41 02/25/2021     Lab Results   Component Value Date    LDLCALC 62.4 (L) 04/28/2022    LDLCALC 54.4 (L) 03/07/2022    LDLCALC 67.4 02/25/2021     Lab Results   Component Value Date    TRIG 88 04/28/2022    TRIG 93 03/07/2022    TRIG 93 02/25/2021     Lab Results   Component Value Date    CHOLHDL 31.0 04/28/2022    CHOLHDL 37.1 03/07/2022    CHOLHDL 32.3 02/25/2021     RPR   Date Value Ref Range Status   05/08/2022 Non-reactive Non-reactive Final   10/21/2019 Non-reactive Non-reactive Final     No results found for: QUANTIFERON    Medications:  Current Outpatient Medications on File Prior to Visit   Medication Sig Dispense Refill    albuterol (PROVENTIL/VENTOLIN HFA) 90 mcg/actuation inhaler INHALE 2 PUFFS BY MOUTH EVERY 6 HOURS AS NEEDED FOR WHEEZING 9 g 0    ascorbic acid, vitamin C, (VITAMIN C) 1000 MG tablet Take 1,000 mg by mouth once daily.      azithromycin (ZITHROMAX) 500 MG tablet Take 1 tablet (500 mg total) by mouth once daily.      b complex vitamins capsule Take 1 capsule by mouth once daily.      ethambutoL (MYAMBUTOL) 400 MG Tab Take 2 tablets (800 mg total) by mouth once daily. 60 tablet 5    LIDOcaine (LIDODERM) 5 % Place 1 patch onto the skin once daily. Remove & Discard patch within 12 hours or as directed by MD  0    melatonin (MELATIN) 3 mg tablet Take 3 tablets (9 mg total) by mouth nightly.  0    mucus clearing device (AEROBIKA OSCILLATING PEP SYSTM) by Misc.(Non-Drug; Combo Route) route 2 (two) times a day. 1 Device 0    nebulizer and compressor (OMBRA COMPRESSOR SYSTEM) Marcy use to administer nebulized medication as directed 1 each 0    omeprazole 20 mg TbEC 1/2 tablet daily in am as needed. 45 each 0    pravastatin (PRAVACHOL) 10 MG tablet Take 1 tablet (10 mg total) by mouth once daily. 90 tablet 0     promethazine-dextromethorphan (PROMETHAZINE-DM) 6.25-15 mg/5 mL Syrp Take 10-15ml by mouth every 8 hours as needed for coughing 240 mL 0    QUEtiapine (SEROQUEL) 50 MG tablet Take 1 tablet (50 mg total) by mouth every evening. 30 tablet 11    sodium chloride 3% 3 % nebulizer solution USE 4 ML VIAL IN NEBULIZER  TWICE DAILY 480 mL 11    [DISCONTINUED] aspirin (ECOTRIN) 81 MG EC tablet Take 81 mg by mouth.      [DISCONTINUED] omega-3 fatty acids 1,000 mg Cap Take 2 g by mouth.       No current facility-administered medications on file prior to visit.       Antibiotics:   Antibiotics (From admission, onward)            None          HIV: No components found for: HIV 1/2 AG/AB  Hepatitis C IgG: No components found for: HEPATITIS C  Syphilis:   RPR   Date Value Ref Range Status   05/08/2022 Non-reactive Non-reactive Final   10/21/2019 Non-reactive Non-reactive Final       Hepatitis A IgG: No components found for: HEPATITIS A IGG  Hepatitis Bc IgG: No components found for: HEPATITIS B CORE IGG  Hepatitis Bs IgG:  Quantiferon: No results found for: QUANTIFERON  VZV IgG: No components found for: VARICELLA IGG    No components found for: SEDIMENTATION RATE  No components found for: C-REACTIVE PROTEIN      Microbiology x 7d:   Microbiology Results (last 7 days)     ** No results found for the last 168 hours. **          Immunization History   Administered Date(s) Administered    COVID-19, MRNA, LN-S, PF (Pfizer) (Purple Cap) 01/13/2021, 04/28/2021, 11/05/2021    Influenza (FLUAD) - Trivalent - Adjuvanted - PF (65+) 09/25/2019    Influenza - High Dose - PF (65 years and older) 09/18/2015, 10/04/2016, 10/23/2017, 09/21/2018    Influenza - Quadrivalent - High Dose - PF (65 years and older) 11/05/2021    Influenza - Quadrivalent - PF *Preferred* (6 months and older) 10/06/2014    Influenza A (H1N1) 2009 Monovalent - IM 01/22/2010    Influenza Split 10/10/2011, 10/06/2014    Pneumococcal Conjugate - 13 Valent  10/28/2015    Pneumococcal Polysaccharide - 23 Valent 07/12/2013    Tdap 03/08/2015    Zoster Recombinant 09/25/2019, 12/30/2019         Reviewed records today as well as relevant labs, cultures, and imaging    Assessment:     Pulmonary MAC, macrolide susceptible  Cavitary lung lesion  Nodular liver- noted on CT; US abdomen 10/2021 No compelling sonographic evidence of cirrhosis.  empysema  Antiphospholipid antibody syndrome on coumadin      Meets ATS/IDSA guidelines for treatment- cough, cavitary lung lesion, MAC from bronch sample. AFB cultures 10/7/21, 10/28/21, 11/18/21, 12/9/21 all positive MAC. CT 11/2021 with RUL cavitary lesion-3.5 x 4.3 x 5.7-cm     Risk factor-   structural: emphysema, bronchiectasis  functional: reflux        Qtc 419 5/13/22     No toxicities from antimicrobials  Extremely medically complex  Patient is high risk for infectious complications given medical comorbidities    Plan:     Continue antibiotics- daily ethambutol and azithromycin (started 12/2021). Didn't tolerate rifampin and ethambutol more important than rifamycin at preventing macrolide resistance.  Note amikacin vimal 64. Duration 1 year from clearance (anticpate >18 months of abx). Counseled extensively on antibiotic side effects. While on ethambutol- self vision screens daily. If blurry vision lasts 2 days in a row, patient known to stop ethambutol and call eye doctor for exam and to let us know that this happened.     Discussed importance of airway clearance. continue Aerobika and nebulized hypertonic saline bid. Recommended decreasing duration of hot showers, avoid soil and water aerosols, use fans in shower area, avoid fine mist shower heads (heavy droplets better), avoid bubbling water (hot tubs, humidifiers), flush water heater frequently to avoid biofilm buildup     Prevent reflux- avoid stomach sleeping. Famotidine/tums. Stop liquids 3hr before bedtime     Monitor afb sputum    - Will monitor drug therapy for  toxicity      - The following labs were ordered:    Orders Placed This Encounter   Procedures    AFB Culture & Smear     Standing Status:   Standing     Number of Occurrences:   11     Standing Expiration Date:   8/14/2023       I have sent communication to the referring physician and/or primary care provider.     I spent a total of 37 minutes on the day of the visit. This includes face to face time and non-face to face time preparing to see the patient (eg, review of tests), obtaining and/or reviewing separately obtained history, documenting clinical information in the electronic or other health record, independently interpreting results, and communicating results to the patient/family/caregiver, or care coordination.      Paola Rao MD, MPH  Infectious Disease

## 2022-06-20 ENCOUNTER — LAB VISIT (OUTPATIENT)
Dept: LAB | Facility: HOSPITAL | Age: 82
End: 2022-06-20
Attending: INTERNAL MEDICINE
Payer: MEDICARE

## 2022-06-20 ENCOUNTER — ANTI-COAG VISIT (OUTPATIENT)
Dept: CARDIOLOGY | Facility: CLINIC | Age: 82
End: 2022-06-20
Payer: MEDICARE

## 2022-06-20 DIAGNOSIS — Z86.711 HISTORY OF PULMONARY EMBOLISM: Primary | ICD-10-CM

## 2022-06-20 DIAGNOSIS — D68.61 ANTIPHOSPHOLIPID SYNDROME: ICD-10-CM

## 2022-06-20 LAB
INR PPP: 2.6 (ref 0.8–1.2)
PROTHROMBIN TIME: 26.2 SEC (ref 9–12.5)

## 2022-06-20 PROCEDURE — 93793 ANTICOAG MGMT PT WARFARIN: CPT | Mod: S$GLB,,,

## 2022-06-20 PROCEDURE — 85610 PROTHROMBIN TIME: CPT | Performed by: STUDENT IN AN ORGANIZED HEALTH CARE EDUCATION/TRAINING PROGRAM

## 2022-06-20 PROCEDURE — 93793 PR ANTICOAGULANT MGMT FOR PT TAKING WARFARIN: ICD-10-PCS | Mod: S$GLB,,,

## 2022-06-20 PROCEDURE — 36415 COLL VENOUS BLD VENIPUNCTURE: CPT | Mod: PO | Performed by: STUDENT IN AN ORGANIZED HEALTH CARE EDUCATION/TRAINING PROGRAM

## 2022-06-20 NOTE — PROGRESS NOTES
INR good but has only just restarted 5 days ago so moving too quickly. Concern for recent spinal hematoma so will cut back on dosing. Also, will make sure pt resumes greens as planned; 3x/week. Follow closely

## 2022-06-21 ENCOUNTER — PES CALL (OUTPATIENT)
Dept: ADMINISTRATIVE | Facility: CLINIC | Age: 82
End: 2022-06-21
Payer: MEDICARE

## 2022-06-23 ENCOUNTER — LAB VISIT (OUTPATIENT)
Dept: LAB | Facility: HOSPITAL | Age: 82
End: 2022-06-23
Attending: STUDENT IN AN ORGANIZED HEALTH CARE EDUCATION/TRAINING PROGRAM
Payer: MEDICARE

## 2022-06-23 ENCOUNTER — ANTI-COAG VISIT (OUTPATIENT)
Dept: CARDIOLOGY | Facility: CLINIC | Age: 82
End: 2022-06-23
Payer: MEDICARE

## 2022-06-23 DIAGNOSIS — Z86.711 HISTORY OF PULMONARY EMBOLISM: Primary | ICD-10-CM

## 2022-06-23 DIAGNOSIS — D68.61 ANTIPHOSPHOLIPID SYNDROME: ICD-10-CM

## 2022-06-23 LAB
INR PPP: 2.2 (ref 0.8–1.2)
PROTHROMBIN TIME: 22.6 SEC (ref 9–12.5)

## 2022-06-23 PROCEDURE — 93793 ANTICOAG MGMT PT WARFARIN: CPT | Mod: S$GLB,,,

## 2022-06-23 PROCEDURE — 85610 PROTHROMBIN TIME: CPT | Performed by: STUDENT IN AN ORGANIZED HEALTH CARE EDUCATION/TRAINING PROGRAM

## 2022-06-23 PROCEDURE — 93793 PR ANTICOAGULANT MGMT FOR PT TAKING WARFARIN: ICD-10-PCS | Mod: S$GLB,,,

## 2022-06-23 PROCEDURE — 36415 COLL VENOUS BLD VENIPUNCTURE: CPT | Mod: PO | Performed by: STUDENT IN AN ORGANIZED HEALTH CARE EDUCATION/TRAINING PROGRAM

## 2022-06-23 NOTE — PROGRESS NOTES
INR at goal, but will continue monitoring patient closely due to recent spinal hematoma and rapid increase in INR after missing for multiple days.

## 2022-06-27 ENCOUNTER — LAB VISIT (OUTPATIENT)
Dept: LAB | Facility: HOSPITAL | Age: 82
End: 2022-06-27
Attending: STUDENT IN AN ORGANIZED HEALTH CARE EDUCATION/TRAINING PROGRAM
Payer: MEDICARE

## 2022-06-27 ENCOUNTER — ANTI-COAG VISIT (OUTPATIENT)
Dept: CARDIOLOGY | Facility: CLINIC | Age: 82
End: 2022-06-27
Payer: MEDICARE

## 2022-06-27 DIAGNOSIS — D68.61 ANTIPHOSPHOLIPID SYNDROME: ICD-10-CM

## 2022-06-27 DIAGNOSIS — Z86.711 HISTORY OF PULMONARY EMBOLISM: Primary | ICD-10-CM

## 2022-06-27 LAB
INR PPP: 2.9 (ref 0.8–1.2)
PROTHROMBIN TIME: 29.5 SEC (ref 9–12.5)

## 2022-06-27 PROCEDURE — 36415 COLL VENOUS BLD VENIPUNCTURE: CPT | Mod: PO | Performed by: STUDENT IN AN ORGANIZED HEALTH CARE EDUCATION/TRAINING PROGRAM

## 2022-06-27 PROCEDURE — 85610 PROTHROMBIN TIME: CPT | Performed by: STUDENT IN AN ORGANIZED HEALTH CARE EDUCATION/TRAINING PROGRAM

## 2022-06-27 PROCEDURE — 93793 PR ANTICOAGULANT MGMT FOR PT TAKING WARFARIN: ICD-10-PCS | Mod: S$GLB,,,

## 2022-06-27 PROCEDURE — 93793 ANTICOAG MGMT PT WARFARIN: CPT | Mod: S$GLB,,,

## 2022-07-05 ENCOUNTER — OFFICE VISIT (OUTPATIENT)
Dept: NEUROSURGERY | Facility: CLINIC | Age: 82
End: 2022-07-05
Payer: MEDICARE

## 2022-07-05 ENCOUNTER — HOSPITAL ENCOUNTER (OUTPATIENT)
Dept: RADIOLOGY | Facility: HOSPITAL | Age: 82
Discharge: HOME | End: 2022-07-05
Attending: STUDENT IN AN ORGANIZED HEALTH CARE EDUCATION/TRAINING PROGRAM
Payer: MEDICARE

## 2022-07-05 ENCOUNTER — ANTI-COAG VISIT (OUTPATIENT)
Dept: CARDIOLOGY | Facility: CLINIC | Age: 82
End: 2022-07-05
Payer: MEDICARE

## 2022-07-05 VITALS
HEART RATE: 53 BPM | HEIGHT: 69 IN | BODY MASS INDEX: 23.7 KG/M2 | SYSTOLIC BLOOD PRESSURE: 128 MMHG | DIASTOLIC BLOOD PRESSURE: 70 MMHG | WEIGHT: 160 LBS

## 2022-07-05 DIAGNOSIS — R33.9 URINARY RETENTION: ICD-10-CM

## 2022-07-05 DIAGNOSIS — S06.4XAA EPIDURAL HEMATOMA: Primary | ICD-10-CM

## 2022-07-05 DIAGNOSIS — D68.61 ANTIPHOSPHOLIPID SYNDROME: ICD-10-CM

## 2022-07-05 DIAGNOSIS — Z86.711 HISTORY OF PULMONARY EMBOLISM: Primary | ICD-10-CM

## 2022-07-05 DIAGNOSIS — G06.2 EPIDURAL ABSCESS: ICD-10-CM

## 2022-07-05 DIAGNOSIS — M48.02 SPINAL STENOSIS, CERVICAL REGION: ICD-10-CM

## 2022-07-05 PROCEDURE — 99024 POSTOP FOLLOW-UP VISIT: CPT | Mod: S$GLB,,, | Performed by: NEUROLOGICAL SURGERY

## 2022-07-05 PROCEDURE — 72040 X-RAY EXAM NECK SPINE 2-3 VW: CPT | Mod: TC

## 2022-07-05 PROCEDURE — 3078F DIAST BP <80 MM HG: CPT | Mod: CPTII,S$GLB,, | Performed by: NEUROLOGICAL SURGERY

## 2022-07-05 PROCEDURE — 72040 XR CERVICAL SPINE AP LATERAL: ICD-10-PCS | Mod: 26,,, | Performed by: RADIOLOGY

## 2022-07-05 PROCEDURE — 3288F PR FALLS RISK ASSESSMENT DOCUMENTED: ICD-10-PCS | Mod: CPTII,S$GLB,, | Performed by: NEUROLOGICAL SURGERY

## 2022-07-05 PROCEDURE — 93793 PR ANTICOAGULANT MGMT FOR PT TAKING WARFARIN: ICD-10-PCS | Mod: S$GLB,,,

## 2022-07-05 PROCEDURE — 76770 US RETROPERITONEAL COMPLETE: ICD-10-PCS | Mod: 26,,, | Performed by: RADIOLOGY

## 2022-07-05 PROCEDURE — 1157F ADVNC CARE PLAN IN RCRD: CPT | Mod: CPTII,S$GLB,, | Performed by: NEUROLOGICAL SURGERY

## 2022-07-05 PROCEDURE — 99024 PR POST-OP FOLLOW-UP VISIT: ICD-10-PCS | Mod: S$GLB,,, | Performed by: NEUROLOGICAL SURGERY

## 2022-07-05 PROCEDURE — 76770 US EXAM ABDO BACK WALL COMP: CPT | Mod: 26,,, | Performed by: RADIOLOGY

## 2022-07-05 PROCEDURE — 99499 UNLISTED E&M SERVICE: CPT | Mod: S$GLB,,, | Performed by: NEUROLOGICAL SURGERY

## 2022-07-05 PROCEDURE — 1101F PT FALLS ASSESS-DOCD LE1/YR: CPT | Mod: CPTII,S$GLB,, | Performed by: NEUROLOGICAL SURGERY

## 2022-07-05 PROCEDURE — 99499 RISK ADDL DX/OHS AUDIT: ICD-10-PCS | Mod: S$GLB,,, | Performed by: NEUROLOGICAL SURGERY

## 2022-07-05 PROCEDURE — 3074F PR MOST RECENT SYSTOLIC BLOOD PRESSURE < 130 MM HG: ICD-10-PCS | Mod: CPTII,S$GLB,, | Performed by: NEUROLOGICAL SURGERY

## 2022-07-05 PROCEDURE — 3074F SYST BP LT 130 MM HG: CPT | Mod: CPTII,S$GLB,, | Performed by: NEUROLOGICAL SURGERY

## 2022-07-05 PROCEDURE — 3288F FALL RISK ASSESSMENT DOCD: CPT | Mod: CPTII,S$GLB,, | Performed by: NEUROLOGICAL SURGERY

## 2022-07-05 PROCEDURE — 1126F AMNT PAIN NOTED NONE PRSNT: CPT | Mod: CPTII,S$GLB,, | Performed by: NEUROLOGICAL SURGERY

## 2022-07-05 PROCEDURE — 1101F PR PT FALLS ASSESS DOC 0-1 FALLS W/OUT INJ PAST YR: ICD-10-PCS | Mod: CPTII,S$GLB,, | Performed by: NEUROLOGICAL SURGERY

## 2022-07-05 PROCEDURE — 72040 X-RAY EXAM NECK SPINE 2-3 VW: CPT | Mod: 26,,, | Performed by: RADIOLOGY

## 2022-07-05 PROCEDURE — 1157F PR ADVANCE CARE PLAN OR EQUIV PRESENT IN MEDICAL RECORD: ICD-10-PCS | Mod: CPTII,S$GLB,, | Performed by: NEUROLOGICAL SURGERY

## 2022-07-05 PROCEDURE — 76770 US EXAM ABDO BACK WALL COMP: CPT | Mod: TC

## 2022-07-05 PROCEDURE — 99999 PR PBB SHADOW E&M-EST. PATIENT-LVL III: ICD-10-PCS | Mod: PBBFAC,,, | Performed by: NEUROLOGICAL SURGERY

## 2022-07-05 PROCEDURE — 1126F PR PAIN SEVERITY QUANTIFIED, NO PAIN PRESENT: ICD-10-PCS | Mod: CPTII,S$GLB,, | Performed by: NEUROLOGICAL SURGERY

## 2022-07-05 PROCEDURE — 3078F PR MOST RECENT DIASTOLIC BLOOD PRESSURE < 80 MM HG: ICD-10-PCS | Mod: CPTII,S$GLB,, | Performed by: NEUROLOGICAL SURGERY

## 2022-07-05 PROCEDURE — 93793 ANTICOAG MGMT PT WARFARIN: CPT | Mod: S$GLB,,,

## 2022-07-05 PROCEDURE — 99999 PR PBB SHADOW E&M-EST. PATIENT-LVL III: CPT | Mod: PBBFAC,,, | Performed by: NEUROLOGICAL SURGERY

## 2022-07-08 ENCOUNTER — OFFICE VISIT (OUTPATIENT)
Dept: UROLOGY | Facility: CLINIC | Age: 82
End: 2022-07-08
Payer: MEDICARE

## 2022-07-08 DIAGNOSIS — Z46.6 URINARY CATHETER (FOLEY) CHANGE REQUIRED: ICD-10-CM

## 2022-07-08 DIAGNOSIS — Z97.8 FOLEY CATHETER IN PLACE: ICD-10-CM

## 2022-07-08 DIAGNOSIS — C61 PROSTATE CANCER: ICD-10-CM

## 2022-07-08 DIAGNOSIS — R33.9 URINARY RETENTION: Primary | ICD-10-CM

## 2022-07-08 DIAGNOSIS — Z90.79 HISTORY OF RADICAL PROSTATECTOMY: ICD-10-CM

## 2022-07-08 PROCEDURE — 1157F ADVNC CARE PLAN IN RCRD: CPT | Mod: CPTII,S$GLB,, | Performed by: NURSE PRACTITIONER

## 2022-07-08 PROCEDURE — 1159F MED LIST DOCD IN RCRD: CPT | Mod: CPTII,S$GLB,, | Performed by: NURSE PRACTITIONER

## 2022-07-08 PROCEDURE — 1159F PR MEDICATION LIST DOCUMENTED IN MEDICAL RECORD: ICD-10-PCS | Mod: CPTII,S$GLB,, | Performed by: NURSE PRACTITIONER

## 2022-07-08 PROCEDURE — 1160F PR REVIEW ALL MEDS BY PRESCRIBER/CLIN PHARMACIST DOCUMENTED: ICD-10-PCS | Mod: CPTII,S$GLB,, | Performed by: NURSE PRACTITIONER

## 2022-07-08 PROCEDURE — 99999 PR PBB SHADOW E&M-EST. PATIENT-LVL III: CPT | Mod: PBBFAC,,, | Performed by: NURSE PRACTITIONER

## 2022-07-08 PROCEDURE — 3288F FALL RISK ASSESSMENT DOCD: CPT | Mod: CPTII,S$GLB,, | Performed by: NURSE PRACTITIONER

## 2022-07-08 PROCEDURE — 3288F PR FALLS RISK ASSESSMENT DOCUMENTED: ICD-10-PCS | Mod: CPTII,S$GLB,, | Performed by: NURSE PRACTITIONER

## 2022-07-08 PROCEDURE — 1157F PR ADVANCE CARE PLAN OR EQUIV PRESENT IN MEDICAL RECORD: ICD-10-PCS | Mod: CPTII,S$GLB,, | Performed by: NURSE PRACTITIONER

## 2022-07-08 PROCEDURE — 1101F PR PT FALLS ASSESS DOC 0-1 FALLS W/OUT INJ PAST YR: ICD-10-PCS | Mod: CPTII,S$GLB,, | Performed by: NURSE PRACTITIONER

## 2022-07-08 PROCEDURE — 51702 PR INSERTION OF TEMPORARY INDWELLING BLADDER CATHETER, SIMPLE: ICD-10-PCS | Mod: S$GLB,,, | Performed by: NURSE PRACTITIONER

## 2022-07-08 PROCEDURE — 99215 OFFICE O/P EST HI 40 MIN: CPT | Mod: 25,S$GLB,, | Performed by: NURSE PRACTITIONER

## 2022-07-08 PROCEDURE — 1101F PT FALLS ASSESS-DOCD LE1/YR: CPT | Mod: CPTII,S$GLB,, | Performed by: NURSE PRACTITIONER

## 2022-07-08 PROCEDURE — 99999 PR PBB SHADOW E&M-EST. PATIENT-LVL III: ICD-10-PCS | Mod: PBBFAC,,, | Performed by: NURSE PRACTITIONER

## 2022-07-08 PROCEDURE — 1160F RVW MEDS BY RX/DR IN RCRD: CPT | Mod: CPTII,S$GLB,, | Performed by: NURSE PRACTITIONER

## 2022-07-08 PROCEDURE — 99215 PR OFFICE/OUTPT VISIT, EST, LEVL V, 40-54 MIN: ICD-10-PCS | Mod: 25,S$GLB,, | Performed by: NURSE PRACTITIONER

## 2022-07-08 PROCEDURE — 51702 INSERT TEMP BLADDER CATH: CPT | Mod: S$GLB,,, | Performed by: NURSE PRACTITIONER

## 2022-07-08 NOTE — PROGRESS NOTES
CHIEF COMPLAINT:    Diego Gunter is a 81 y.o. male presents today for Urinary Retention.     HISTORY OF PRESENTING ILLINESS:    Diego Gunter is a 81 y.o. male with PMH antiphospholipid syndrome on chronic coumadin for left PE, DM2, prostate ca s/p prostatectomy 2011, MAC pneumonia dx Fall 2018, mild cognitive impairment, and HLD  04/28/2022 underwent Cervical Spine Surgery/compression of epidural hematoma of cervical spine.   Urology was consulted due to severe hydronephrosis; high PVR.   Last clinic visit on 6/08/2022 to establish care after the hospital.  We exchanged out his amaya; discussed a plan.   07/05/2022 showed resolution of Hydronephrosis. Complex renal cyst and AML.    Here today for amaya exchange and discuss the next step.               04/28/2022 He was transferred from OSH for compressive epidural hematoma in the cervical spine where he had surgery with Dr. Miller.     Posterior approach for a C3, C4, C5, and C6 laminectomy in medial facetectomy for decompression of spinal cord 2.   Removal epidural hematoma 3. C3-4, C4-5, C5-6 posterolateral instrumented fusion morselized allograft local autograft from laminectomy      05/04/2022 CTAP showed:  Kidneys are stable in size in the expected location.    -Massively dilated urinary bladder measuring 24 cm and resulting in moderate hydroureteronephrosis bilaterally.   -Focus of nondependent air in the urinary bladder     Urology consulted for b/l hydro. During this admission, he has had multiple catheter placements with high volume UOP. When speaking to him, he was hard to understand upon questioning. He has not had any urinary issues at home. No dysuria or hematuria. He does not have a history of retention.     He was to keep amaya cath in place; f/u in Physician clinic for VT; US of kidneys before;   He did not follow up.     Currently at [facility]; was followed by [facility].  A report given from 6/1/2022; states amaya had  been out a couple of days.  Nursing staff reported urine dribbling; little output     He is here today with his family. Family reports that since he went to [facility], the follow up care was lost.   They will be taking him soon.                REVIEW OF SYSTEMS:  Review of Systems   Constitutional: Negative.  Negative for chills and fever.   Eyes: Negative for double vision.   Respiratory: Negative for cough and shortness of breath.    Cardiovascular: Negative for chest pain and palpitations.   Gastrointestinal: Negative for nausea and vomiting.   Genitourinary: Negative.  Negative for dysuria, flank pain and hematuria.        Draining clear yellow urine in the bag.  Has seen some blood; red specs.   Clears with fluids     Musculoskeletal:        Moving around well     Neurological: Negative for dizziness.         PATIENT HISTORY:    Past Medical History:   Diagnosis Date    Antiphospholipid syndrome 11/19/2021    Dysphagia     Hx of colonic polyps     followed by GI. Dr. Pham    Hyperlipidemia LDL goal <100     Overweight(278.02)     Prostate cancer september 2011    followed by urology, Dr. Rogers    Type II or unspecified type diabetes mellitus without mention of complication, not stated as uncontrolled     diet controlled       Past Surgical History:   Procedure Laterality Date    BRONCHOSCOPY N/A 10/15/2018    Procedure: BRONCHOSCOPY;  Surgeon: Pratibha Diagnostic Provider;  Location: [facility] OR [address] [place] [address];  Service: Anesthesiology;  Laterality: N/A;    CATARACT EXTRACTION, BILATERAL  2014    POSTERIOR CERVICAL LAMINECTOMY Bilateral 4/28/2022    Procedure: LAMINECTOMY, SPINE, CERVICAL, POSTERIOR APPROACH C3-6;  Surgeon: Carlos Miller MD;  Location: [facility] OR [address] [place] [address];  Service: Neurosurgery;  Laterality: Bilateral;    PROSTATECTOMY         Family History   Problem Relation Age of Onset    Colon cancer Mother     Diabetes Mother     Heart disease Father     Mental illness Sister     Diabetes Sister      Other Brother         polio as a child    Pulmonary fibrosis Brother     Diabetes Unknown     Diabetes Brother     Myasthenia gravis Brother     Parkinsonism Brother     Diabetes Brother     Dementia Brother     Lung cancer Brother         smoker    Diabetes Maternal Aunt        Social History     Socioeconomic History    Marital status:     Number of children: 2   Occupational History    Occupation: Surgient    Tobacco Use    Smoking status: Former Smoker     Packs/day: 0.25     Years: 5.00     Pack years: 1.25     Quit date: 10/2/1962     Years since quittin.8    Smokeless tobacco: Former User    Tobacco comment: quit age 23   Substance and Sexual Activity    Alcohol use: Yes     Comment: beer occasionally    Drug use: No    Sexual activity: Yes     Partners: Female       Allergies:  Patient has no known allergies.    Medications:    Current Outpatient Medications:     azithromycin (ZITHROMAX) 500 MG tablet, Take 1 tablet (500 mg total) by mouth once daily., Disp: , Rfl:     b complex vitamins capsule, Take 1 capsule by mouth once daily., Disp: , Rfl:     ethambutoL (MYAMBUTOL) 400 MG Tab, Take 2 tablets (800 mg total) by mouth once daily., Disp: 60 tablet, Rfl: 5    nebulizer and compressor ([employer] COMPRESSOR SYSTEM) Marcy, use to administer nebulized medication as directed, Disp: 1 each, Rfl: 0    pravastatin (PRAVACHOL) 10 MG tablet, Take 1 tablet (10 mg total) by mouth once daily., Disp: 90 tablet, Rfl: 0    sodium chloride 3% 3 % nebulizer solution, USE 4 ML VIAL IN NEBULIZER  TWICE DAILY, Disp: 480 mL, Rfl: 11    warfarin (COUMADIN) 3 MG tablet, Take 1-1.5 tablets (3-4.5 mg total) by mouth Daily. As directed by coumadin clinic, Disp: 45 tablet, Rfl: 11    PHYSICAL EXAMINATION:  Physical Exam  Vitals and nursing note reviewed.   Constitutional:       General: He is awake.      Appearance: Normal appearance.   HENT:      Head: Normocephalic.      Right Ear: External  ear normal.      Left Ear: External ear normal.      Nose: Nose normal.   Cardiovascular:      Rate and Rhythm: Normal rate.   Pulmonary:      Effort: Pulmonary effort is normal. No respiratory distress.   Abdominal:      Tenderness: There is no abdominal tenderness. There is no right CVA tenderness or left CVA tenderness.   Genitourinary:     Penis: Normal.       Testes: Normal.   Musculoskeletal:         General: Normal range of motion.      Cervical back: Normal range of motion.   Skin:     General: Skin is warm and dry.   Neurological:      General: No focal deficit present.      Mental Status: He is alert and oriented to person, place, and time.   Psychiatric:         Mood and Affect: Mood normal.         Behavior: Behavior is cooperative.           LABS:        Lab Results   Component Value Date    PSA 0.02 10/28/2015    PSADIAG 0.02 06/08/2022             IMPRESSION:    Encounter Diagnoses   Name Primary?    Urinary retention Yes    Prostate cancer     History of radical prostatectomy     Amaya catheter in place     Urinary catheter (Amaya) change required          Assessment:       1. Urinary retention    2. Prostate cancer    3. History of radical prostatectomy    4. Amaya catheter in place    5. Urinary catheter (Amaya) change required        Plan:         I spent 40 minutes with the patient of which more than half was spent in direct consultation with the patient in regards to our treatment and plan.  We addressed the office findings and recent labs.   Education and recommendations of today's plan of care including home remedies and needed follow up with PCP.   We discussed the visit today and plan of care due to the possible contributory factors for the retention;  Had discussed with Dr. Foote; if from surgery then could be 6 months; SUDS then.   We will change out amaya today.  He will RTC one month for VT and possible replacement  Discussed expectations, risks with amaya caths long term. Reassurance  given  Current amaya removed without difficulty.  New 16fr amaya was placed without difficulty.  Tolerated well

## 2022-07-13 ENCOUNTER — LAB VISIT (OUTPATIENT)
Dept: LAB | Facility: HOSPITAL | Age: 82
End: 2022-07-13
Attending: STUDENT IN AN ORGANIZED HEALTH CARE EDUCATION/TRAINING PROGRAM
Payer: MEDICARE

## 2022-07-13 ENCOUNTER — ANTI-COAG VISIT (OUTPATIENT)
Dept: CARDIOLOGY | Facility: CLINIC | Age: 82
End: 2022-07-13
Payer: MEDICARE

## 2022-07-13 DIAGNOSIS — Z79.01 LONG TERM (CURRENT) USE OF ANTICOAGULANTS: ICD-10-CM

## 2022-07-13 DIAGNOSIS — D68.61 ANTIPHOSPHOLIPID SYNDROME: ICD-10-CM

## 2022-07-13 DIAGNOSIS — Z86.711 HISTORY OF PULMONARY EMBOLISM: Primary | ICD-10-CM

## 2022-07-13 LAB
BASOPHILS # BLD AUTO: 0.06 K/UL (ref 0–0.2)
BASOPHILS NFR BLD: 1 % (ref 0–1.9)
DIFFERENTIAL METHOD: ABNORMAL
EOSINOPHIL # BLD AUTO: 0.1 K/UL (ref 0–0.5)
EOSINOPHIL NFR BLD: 1.5 % (ref 0–8)
ERYTHROCYTE [DISTWIDTH] IN BLOOD BY AUTOMATED COUNT: 14.6 % (ref 11.5–14.5)
HCT VFR BLD AUTO: 39.6 % (ref 40–54)
HGB BLD-MCNC: 12.6 G/DL (ref 14–18)
IMM GRANULOCYTES # BLD AUTO: 0.02 K/UL (ref 0–0.04)
IMM GRANULOCYTES NFR BLD AUTO: 0.3 % (ref 0–0.5)
INR PPP: 2 (ref 0.8–1.2)
LYMPHOCYTES # BLD AUTO: 2.2 K/UL (ref 1–4.8)
LYMPHOCYTES NFR BLD: 36 % (ref 18–48)
MCH RBC QN AUTO: 27.3 PG (ref 27–31)
MCHC RBC AUTO-ENTMCNC: 31.8 G/DL (ref 32–36)
MCV RBC AUTO: 86 FL (ref 82–98)
MONOCYTES # BLD AUTO: 0.8 K/UL (ref 0.3–1)
MONOCYTES NFR BLD: 13 % (ref 4–15)
NEUTROPHILS # BLD AUTO: 2.9 K/UL (ref 1.8–7.7)
NEUTROPHILS NFR BLD: 48.2 % (ref 38–73)
NRBC BLD-RTO: 0 /100 WBC
PLATELET # BLD AUTO: 279 K/UL (ref 150–450)
PMV BLD AUTO: 10.8 FL (ref 9.2–12.9)
PROTHROMBIN TIME: 20.3 SEC (ref 9–12.5)
RBC # BLD AUTO: 4.61 M/UL (ref 4.6–6.2)
WBC # BLD AUTO: 6.09 K/UL (ref 3.9–12.7)

## 2022-07-13 PROCEDURE — 85610 PROTHROMBIN TIME: CPT | Performed by: STUDENT IN AN ORGANIZED HEALTH CARE EDUCATION/TRAINING PROGRAM

## 2022-07-13 PROCEDURE — 93793 PR ANTICOAGULANT MGMT FOR PT TAKING WARFARIN: ICD-10-PCS | Mod: S$GLB,,,

## 2022-07-13 PROCEDURE — 85025 COMPLETE CBC W/AUTO DIFF WBC: CPT | Performed by: STUDENT IN AN ORGANIZED HEALTH CARE EDUCATION/TRAINING PROGRAM

## 2022-07-13 PROCEDURE — 93793 ANTICOAG MGMT PT WARFARIN: CPT | Mod: S$GLB,,,

## 2022-07-13 PROCEDURE — 36415 COLL VENOUS BLD VENIPUNCTURE: CPT | Mod: PO | Performed by: STUDENT IN AN ORGANIZED HEALTH CARE EDUCATION/TRAINING PROGRAM

## 2022-07-13 NOTE — PROGRESS NOTES
INR decreasing and past history of higher dose. Increase dose slightly. Recheck INR within 2 weeks    Update 7/22: Pt saw Dr. Pantoja today who recommended warfarin d/c. We will d/c from clinic.

## 2022-07-14 ENCOUNTER — LAB VISIT (OUTPATIENT)
Dept: LAB | Facility: HOSPITAL | Age: 82
End: 2022-07-14
Attending: INTERNAL MEDICINE
Payer: MEDICARE

## 2022-07-14 DIAGNOSIS — A31.0 MYCOBACTERIUM AVIUM-INTRACELLULARE COMPLEX: ICD-10-CM

## 2022-07-14 PROCEDURE — 87116 MYCOBACTERIA CULTURE: CPT | Performed by: INTERNAL MEDICINE

## 2022-07-14 PROCEDURE — 87149 DNA/RNA DIRECT PROBE: CPT | Performed by: INTERNAL MEDICINE

## 2022-07-14 PROCEDURE — 87206 SMEAR FLUORESCENT/ACID STAI: CPT | Performed by: INTERNAL MEDICINE

## 2022-07-14 PROCEDURE — 87118 MYCOBACTERIC IDENTIFICATION: CPT | Mod: 59 | Performed by: INTERNAL MEDICINE

## 2022-07-14 PROCEDURE — 87015 SPECIMEN INFECT AGNT CONCNTJ: CPT | Performed by: INTERNAL MEDICINE

## 2022-07-18 DIAGNOSIS — Z98.890 STATUS POST LAMINECTOMY: Primary | ICD-10-CM

## 2022-07-18 DIAGNOSIS — A31.0 MYCOBACTERIUM AVIUM-INTRACELLULARE COMPLEX: Primary | ICD-10-CM

## 2022-07-18 NOTE — TELEPHONE ENCOUNTER
----- Message from Jonnie Gonzales sent at 7/18/2022  2:43 PM CDT -----  Regarding: medication refill  Contact: Addis ( spouse ) @ [phone]  Caller calling to get an update on the medication refill request on (azithromycin (ZITHROMAX) 500 MG tablet ) caller is requesting a 3mon supply, pls call and send to:     [facility] - [place], [place]  [address]  [address] [facility]  [place] [address]  Phone: [phone] Fax: [phone]    PATIENT IS OUT OF MEDICATION

## 2022-07-19 ENCOUNTER — OFFICE VISIT (OUTPATIENT)
Dept: HOME HEALTH SERVICES | Facility: CLINIC | Age: 82
End: 2022-07-19
Payer: MEDICARE

## 2022-07-19 VITALS
BODY MASS INDEX: 23.7 KG/M2 | HEART RATE: 59 BPM | DIASTOLIC BLOOD PRESSURE: 65 MMHG | WEIGHT: 160 LBS | HEIGHT: 69 IN | SYSTOLIC BLOOD PRESSURE: 104 MMHG

## 2022-07-19 DIAGNOSIS — Z00.00 ENCOUNTER FOR PREVENTIVE HEALTH EXAMINATION: Primary | ICD-10-CM

## 2022-07-19 DIAGNOSIS — R33.9 URINARY RETENTION: ICD-10-CM

## 2022-07-19 DIAGNOSIS — R26.9 ABNORMALITY OF GAIT AND MOBILITY: ICD-10-CM

## 2022-07-19 DIAGNOSIS — E78.5 HYPERLIPIDEMIA, UNSPECIFIED HYPERLIPIDEMIA TYPE: ICD-10-CM

## 2022-07-19 DIAGNOSIS — Z98.1 STATUS POST LAMINECTOMY WITH SPINAL FUSION: ICD-10-CM

## 2022-07-19 DIAGNOSIS — R73.03 PREDIABETES: ICD-10-CM

## 2022-07-19 DIAGNOSIS — A31.0 MAI (MYCOBACTERIUM AVIUM-INTRACELLULARE): ICD-10-CM

## 2022-07-19 DIAGNOSIS — I70.0 ATHEROSCLEROSIS OF AORTA: ICD-10-CM

## 2022-07-19 DIAGNOSIS — G31.84 MILD COGNITIVE IMPAIRMENT: ICD-10-CM

## 2022-07-19 DIAGNOSIS — D68.61 ANTIPHOSPHOLIPID SYNDROME: ICD-10-CM

## 2022-07-19 DIAGNOSIS — S06.4XAA EPIDURAL HEMATOMA: ICD-10-CM

## 2022-07-19 DIAGNOSIS — Z86.711 HISTORY OF PULMONARY EMBOLISM: ICD-10-CM

## 2022-07-19 DIAGNOSIS — J43.2 CENTRILOBULAR EMPHYSEMA: ICD-10-CM

## 2022-07-19 DIAGNOSIS — Z99.89 DEPENDENCE ON OTHER ENABLING MACHINES AND DEVICES: ICD-10-CM

## 2022-07-19 PROCEDURE — 1101F PT FALLS ASSESS-DOCD LE1/YR: CPT | Mod: CPTII,S$GLB,, | Performed by: NURSE PRACTITIONER

## 2022-07-19 PROCEDURE — 3288F PR FALLS RISK ASSESSMENT DOCUMENTED: ICD-10-PCS | Mod: CPTII,S$GLB,, | Performed by: NURSE PRACTITIONER

## 2022-07-19 PROCEDURE — 1126F AMNT PAIN NOTED NONE PRSNT: CPT | Mod: CPTII,S$GLB,, | Performed by: NURSE PRACTITIONER

## 2022-07-19 PROCEDURE — 1126F PR PAIN SEVERITY QUANTIFIED, NO PAIN PRESENT: ICD-10-PCS | Mod: CPTII,S$GLB,, | Performed by: NURSE PRACTITIONER

## 2022-07-19 PROCEDURE — G0439 PR MEDICARE ANNUAL WELLNESS SUBSEQUENT VISIT: ICD-10-PCS | Mod: S$GLB,,, | Performed by: NURSE PRACTITIONER

## 2022-07-19 PROCEDURE — 3074F SYST BP LT 130 MM HG: CPT | Mod: CPTII,S$GLB,, | Performed by: NURSE PRACTITIONER

## 2022-07-19 PROCEDURE — 99499 UNLISTED E&M SERVICE: CPT | Mod: S$GLB,,, | Performed by: NURSE PRACTITIONER

## 2022-07-19 PROCEDURE — 3074F PR MOST RECENT SYSTOLIC BLOOD PRESSURE < 130 MM HG: ICD-10-PCS | Mod: CPTII,S$GLB,, | Performed by: NURSE PRACTITIONER

## 2022-07-19 PROCEDURE — 1159F MED LIST DOCD IN RCRD: CPT | Mod: CPTII,S$GLB,, | Performed by: NURSE PRACTITIONER

## 2022-07-19 PROCEDURE — 3078F DIAST BP <80 MM HG: CPT | Mod: CPTII,S$GLB,, | Performed by: NURSE PRACTITIONER

## 2022-07-19 PROCEDURE — 1159F PR MEDICATION LIST DOCUMENTED IN MEDICAL RECORD: ICD-10-PCS | Mod: CPTII,S$GLB,, | Performed by: NURSE PRACTITIONER

## 2022-07-19 PROCEDURE — 1160F PR REVIEW ALL MEDS BY PRESCRIBER/CLIN PHARMACIST DOCUMENTED: ICD-10-PCS | Mod: CPTII,S$GLB,, | Performed by: NURSE PRACTITIONER

## 2022-07-19 PROCEDURE — 1101F PR PT FALLS ASSESS DOC 0-1 FALLS W/OUT INJ PAST YR: ICD-10-PCS | Mod: CPTII,S$GLB,, | Performed by: NURSE PRACTITIONER

## 2022-07-19 PROCEDURE — 1157F PR ADVANCE CARE PLAN OR EQUIV PRESENT IN MEDICAL RECORD: ICD-10-PCS | Mod: CPTII,S$GLB,, | Performed by: NURSE PRACTITIONER

## 2022-07-19 PROCEDURE — 3078F PR MOST RECENT DIASTOLIC BLOOD PRESSURE < 80 MM HG: ICD-10-PCS | Mod: CPTII,S$GLB,, | Performed by: NURSE PRACTITIONER

## 2022-07-19 PROCEDURE — 99499 RISK ADDL DX/OHS AUDIT: ICD-10-PCS | Mod: S$GLB,,, | Performed by: NURSE PRACTITIONER

## 2022-07-19 PROCEDURE — 1160F RVW MEDS BY RX/DR IN RCRD: CPT | Mod: CPTII,S$GLB,, | Performed by: NURSE PRACTITIONER

## 2022-07-19 PROCEDURE — 3288F FALL RISK ASSESSMENT DOCD: CPT | Mod: CPTII,S$GLB,, | Performed by: NURSE PRACTITIONER

## 2022-07-19 PROCEDURE — G0439 PPPS, SUBSEQ VISIT: HCPCS | Mod: S$GLB,,, | Performed by: NURSE PRACTITIONER

## 2022-07-19 PROCEDURE — 1157F ADVNC CARE PLAN IN RCRD: CPT | Mod: CPTII,S$GLB,, | Performed by: NURSE PRACTITIONER

## 2022-07-19 RX ORDER — AZITHROMYCIN 500 MG/1
500 TABLET, FILM COATED ORAL DAILY
Qty: 30 TABLET | Refills: 5
Start: 2022-07-19 | End: 2022-08-15 | Stop reason: SDUPTHER

## 2022-07-19 NOTE — PATIENT INSTRUCTIONS
Counseling and Referral of Other Preventative  (Italic type indicates deductible and co-insurance are waived)    Patient Name: Diego Gunter  Today's Date: 7/19/2022    Health Maintenance       Date Due Completion Date    COVID-19 Vaccine (4 - Booster for Pfizer series) 03/05/2022 11/5/2021    Influenza Vaccine (1) 09/01/2022 11/5/2021    Override on 10/19/2020: Done    Hemoglobin A1c 10/28/2022 4/28/2022    Lipid Panel 04/28/2023 4/28/2022    TETANUS VACCINE 03/08/2025 3/8/2015        No orders of the defined types were placed in this encounter.    The following information is provided to all patients.  This information is to help you find resources for any of the problems found today that may be affecting your health:                Living healthy guide: www.[facility].[place].gov      Understanding Diabetes: www.diabetes.org      Eating healthy: www.cdc.gov/healthyweight      [facility] home safety checklist: www.cdc.gov/steadi/patient.html      Agency on Aging: www.goea.[place].[facility]      Alcoholics anonymous (AA): www.aa.org      Physical Activity: www.wing.nih.gov/db2nuuy      Tobacco use: www.quitwithusla.org

## 2022-07-19 NOTE — TELEPHONE ENCOUNTER
7/19/22    Called [facility], confirmed rx and pt wife advised pharmacy working on the rx.       ----- Message from Kimi Larry sent at 7/19/2022 12:59 PM CDT -----  RACIEL MALCOLM wife geraldine calling regarding azithromycin (ZITHROMAX) 500 MG tablet.  Klaudia Alexander, stated they never rec'd the Rx for this medication.  receipt was not confirmed in system by pharmacy.   Can you resend it.  pt call back [phone]      [facility] - [place] - [address]  [address] [facility]  [place] [address]  Phone: [phone] Fax: [phone]

## 2022-07-19 NOTE — PROGRESS NOTES
"  Diego Gunter presented for a  Medicare AWV and comprehensive Health Risk Assessment today. The following components were reviewed and updated:    · Medical history  · Family History  · Social history  · Allergies and Current Medications  · Health Risk Assessment  · Health Maintenance  · Care Team         ** See Completed Assessments for Annual Wellness Visit within the encounter summary.**         The following assessments were completed:  · Living Situation  · CAGE  · Depression Screening  · Timed Get Up and Go  · Whisper Test  · Cognitive Function Screening  · Nutrition Screening  · ADL Screening  · PAQ Screening        Vitals:    07/19/22 1034   BP: 104/65   Pulse: (!) 59   Weight: 72.6 kg (160 lb)   Height: 5' 9" (1.753 m)     Body mass index is 23.63 kg/m².  Physical Exam  Constitutional:       Appearance: Normal appearance.   HENT:      Head: Normocephalic and atraumatic.      Nose: Nose normal.      Mouth/Throat:      Mouth: Mucous membranes are moist.   Eyes:      Extraocular Movements: Extraocular movements intact.   Cardiovascular:      Rate and Rhythm: Regular rhythm. Bradycardia present.      Heart sounds: Normal heart sounds.   Pulmonary:      Effort: Pulmonary effort is normal. No respiratory distress.      Breath sounds: Normal breath sounds.   Abdominal:      General: Bowel sounds are normal. There is no distension.      Palpations: Abdomen is soft.   Genitourinary:     Comments: Indwelling Floyd catheter present  Musculoskeletal:         General: No swelling. Normal range of motion.   Skin:     General: Skin is warm and dry.   Neurological:      General: No focal deficit present.      Mental Status: He is alert and oriented to person, place, and time.      Gait: Gait abnormal (walker present).   Psychiatric:         Mood and Affect: Mood normal.         Behavior: Behavior normal.               Diagnoses and health risks identified today and associated recommendations/orders:    1. Encounter for " preventive health examination  Assessments completed. Preventive measures and health maintenance reviewed with patient and patients spouse.    2. HILLARY (mycobacterium avium-intracellulare)  Stable, on oral antibiotics. Followed by Infectious Disease.    3. Antiphospholipid syndrome  Stable, on Coumadin. Followed by Cardiology.    4. Atherosclerosis of aorta  Stable, followed by PCP.    5. Centrilobular emphysema  Stable, followed by PCP.    6. Hyperlipidemia, unspecified hyperlipidemia type  Stable, on Statin. Followed by PCP.    7. History of pulmonary embolism  Stable, on Coumadin. Followed by PCP.    8. Prediabetes  Stable, followed by PCP.    9. Mild cognitive impairment  Stable, followed by PCP.    10. Epidural hematoma   Stable, followed by Neurosurgery. S/P laminectomy, spine, cervical, posterior approach C3-6 on 4/28/2022.    11. Status post laminectomy with spinal fusion  Stable, followed by Neurosurgery. S/P laminectomy, spine, cervical, posterior approach C3-6 on 4/28/2022.    12. Urinary retention  Stable, followed Urology. Indwelling amaya catheter present.     13. Abnormality of gait and mobility  Stable, followed by PCP. Patient has rolling walker.    14. Dependence on other enabling machines and devices  Stable, followed by PCP. Patient has rolling walker.    Provided Diego with a 5-10 year written screening schedule and personal prevention plan. Recommendations were developed using the USPSTF age appropriate recommendations. Education, counseling, and referrals were provided as needed. After Visit Summary printed and given to patient which includes a list of additional screenings\tests needed.    Follow up in about 1 year (around 7/19/2023) for your next annual wellness visit.    Cynthia Harris NP  I offered to discuss advanced care planning, including how to pick a person who would make decisions for you if you were unable to make them for yourself, called a health care power of , and  what kind of decisions you might make such as use of life sustaining treatments such as ventilators and tube feeding when faced with a life limiting illness recorded on a living will that they will need to know. (How you want to be cared for as you near the end of your natural life)     X  Patient has advanced directives on file, which we reviewed, and they do not wish to make changes.

## 2022-07-20 PROBLEM — Z98.1 STATUS POST LAMINECTOMY WITH SPINAL FUSION: Status: ACTIVE | Noted: 2022-07-20

## 2022-07-20 PROBLEM — E78.5 HYPERLIPIDEMIA: Status: ACTIVE | Noted: 2022-07-20

## 2022-07-20 NOTE — PROGRESS NOTES
Patient is here to see us for follow-up after having undergone an emergent posterior C3 through C6 laminectomy with posterolateral fusion for large epidural hematoma with quadriparesis.  This was done on 04/28/2022.  Patient has undergone extensive rehab.  Still has urinary retention.  His strength is significantly improved but not yet back to baseline.  His wound looks like is well-healed.  Postop x-rays shows hardware is in good position.  At this stage we will continue to advance his activity levels.  He continues to need aggressive PT OT.  We will plan to check a CAT scan in 6-7 months evaluate the hardware fusion.

## 2022-07-22 ENCOUNTER — OFFICE VISIT (OUTPATIENT)
Dept: HEMATOLOGY/ONCOLOGY | Facility: CLINIC | Age: 82
End: 2022-07-22
Payer: MEDICARE

## 2022-07-22 VITALS
WEIGHT: 160.94 LBS | DIASTOLIC BLOOD PRESSURE: 67 MMHG | BODY MASS INDEX: 23.84 KG/M2 | OXYGEN SATURATION: 96 % | HEART RATE: 68 BPM | HEIGHT: 69 IN | SYSTOLIC BLOOD PRESSURE: 114 MMHG

## 2022-07-22 DIAGNOSIS — G81.91 RIGHT HEMIPARESIS: ICD-10-CM

## 2022-07-22 DIAGNOSIS — Z86.711 HISTORY OF PULMONARY EMBOLISM: ICD-10-CM

## 2022-07-22 DIAGNOSIS — S06.4XAA EPIDURAL HEMATOMA: ICD-10-CM

## 2022-07-22 DIAGNOSIS — D68.61 ANTIPHOSPHOLIPID SYNDROME: ICD-10-CM

## 2022-07-22 DIAGNOSIS — R76.0 POSITIVE CARDIOLIPIN ANTIBODIES: Primary | ICD-10-CM

## 2022-07-22 PROCEDURE — 99999 PR PBB SHADOW E&M-EST. PATIENT-LVL III: CPT | Mod: PBBFAC,,, | Performed by: STUDENT IN AN ORGANIZED HEALTH CARE EDUCATION/TRAINING PROGRAM

## 2022-07-22 PROCEDURE — 99215 PR OFFICE/OUTPT VISIT, EST, LEVL V, 40-54 MIN: ICD-10-PCS | Mod: S$GLB,,, | Performed by: STUDENT IN AN ORGANIZED HEALTH CARE EDUCATION/TRAINING PROGRAM

## 2022-07-22 PROCEDURE — 3288F FALL RISK ASSESSMENT DOCD: CPT | Mod: CPTII,S$GLB,, | Performed by: STUDENT IN AN ORGANIZED HEALTH CARE EDUCATION/TRAINING PROGRAM

## 2022-07-22 PROCEDURE — 99999 PR PBB SHADOW E&M-EST. PATIENT-LVL III: ICD-10-PCS | Mod: PBBFAC,,, | Performed by: STUDENT IN AN ORGANIZED HEALTH CARE EDUCATION/TRAINING PROGRAM

## 2022-07-22 PROCEDURE — 1157F ADVNC CARE PLAN IN RCRD: CPT | Mod: CPTII,S$GLB,, | Performed by: STUDENT IN AN ORGANIZED HEALTH CARE EDUCATION/TRAINING PROGRAM

## 2022-07-22 PROCEDURE — 1159F PR MEDICATION LIST DOCUMENTED IN MEDICAL RECORD: ICD-10-PCS | Mod: CPTII,S$GLB,, | Performed by: STUDENT IN AN ORGANIZED HEALTH CARE EDUCATION/TRAINING PROGRAM

## 2022-07-22 PROCEDURE — 3288F PR FALLS RISK ASSESSMENT DOCUMENTED: ICD-10-PCS | Mod: CPTII,S$GLB,, | Performed by: STUDENT IN AN ORGANIZED HEALTH CARE EDUCATION/TRAINING PROGRAM

## 2022-07-22 PROCEDURE — 3074F PR MOST RECENT SYSTOLIC BLOOD PRESSURE < 130 MM HG: ICD-10-PCS | Mod: CPTII,S$GLB,, | Performed by: STUDENT IN AN ORGANIZED HEALTH CARE EDUCATION/TRAINING PROGRAM

## 2022-07-22 PROCEDURE — 1157F PR ADVANCE CARE PLAN OR EQUIV PRESENT IN MEDICAL RECORD: ICD-10-PCS | Mod: CPTII,S$GLB,, | Performed by: STUDENT IN AN ORGANIZED HEALTH CARE EDUCATION/TRAINING PROGRAM

## 2022-07-22 PROCEDURE — 1101F PR PT FALLS ASSESS DOC 0-1 FALLS W/OUT INJ PAST YR: ICD-10-PCS | Mod: CPTII,S$GLB,, | Performed by: STUDENT IN AN ORGANIZED HEALTH CARE EDUCATION/TRAINING PROGRAM

## 2022-07-22 PROCEDURE — 3078F PR MOST RECENT DIASTOLIC BLOOD PRESSURE < 80 MM HG: ICD-10-PCS | Mod: CPTII,S$GLB,, | Performed by: STUDENT IN AN ORGANIZED HEALTH CARE EDUCATION/TRAINING PROGRAM

## 2022-07-22 PROCEDURE — 1159F MED LIST DOCD IN RCRD: CPT | Mod: CPTII,S$GLB,, | Performed by: STUDENT IN AN ORGANIZED HEALTH CARE EDUCATION/TRAINING PROGRAM

## 2022-07-22 PROCEDURE — 99499 UNLISTED E&M SERVICE: CPT | Mod: S$GLB,,, | Performed by: STUDENT IN AN ORGANIZED HEALTH CARE EDUCATION/TRAINING PROGRAM

## 2022-07-22 PROCEDURE — 1125F AMNT PAIN NOTED PAIN PRSNT: CPT | Mod: CPTII,S$GLB,, | Performed by: STUDENT IN AN ORGANIZED HEALTH CARE EDUCATION/TRAINING PROGRAM

## 2022-07-22 PROCEDURE — 1125F PR PAIN SEVERITY QUANTIFIED, PAIN PRESENT: ICD-10-PCS | Mod: CPTII,S$GLB,, | Performed by: STUDENT IN AN ORGANIZED HEALTH CARE EDUCATION/TRAINING PROGRAM

## 2022-07-22 PROCEDURE — 3074F SYST BP LT 130 MM HG: CPT | Mod: CPTII,S$GLB,, | Performed by: STUDENT IN AN ORGANIZED HEALTH CARE EDUCATION/TRAINING PROGRAM

## 2022-07-22 PROCEDURE — 99499 RISK ADDL DX/OHS AUDIT: ICD-10-PCS | Mod: S$GLB,,, | Performed by: STUDENT IN AN ORGANIZED HEALTH CARE EDUCATION/TRAINING PROGRAM

## 2022-07-22 PROCEDURE — 1101F PT FALLS ASSESS-DOCD LE1/YR: CPT | Mod: CPTII,S$GLB,, | Performed by: STUDENT IN AN ORGANIZED HEALTH CARE EDUCATION/TRAINING PROGRAM

## 2022-07-22 PROCEDURE — 99215 OFFICE O/P EST HI 40 MIN: CPT | Mod: S$GLB,,, | Performed by: STUDENT IN AN ORGANIZED HEALTH CARE EDUCATION/TRAINING PROGRAM

## 2022-07-22 PROCEDURE — 3078F DIAST BP <80 MM HG: CPT | Mod: CPTII,S$GLB,, | Performed by: STUDENT IN AN ORGANIZED HEALTH CARE EDUCATION/TRAINING PROGRAM

## 2022-07-22 NOTE — PROGRESS NOTES
Chief Complaint : Left sided Pulmonary embolism.    Hx of Present illness :  Patient is a 81 y.o. year old male who presents to the clinic today for follow up.   On 01/30/21 : admitted with hemoptysis, and cough. Documented to have Covid infection. Hx of pulmonary  Embolism; unspecified chronicity.  CTA on 1/30/21 revealed changes of severe emphysema and small thromboemboli in left upper lobar artery.  Patient was started on coumadin. dx'd with MAC four years ago.  No sedantry habits. No recent surgery, Seven hour travel  To [place] in Oct 2020.  He was seen by Dr. Beltran and initial hypercoagulable workup was abnormal.  He was seen by me (Dr. Pantoja) for follow up and it was felt that clinically his story made more sense for provoked PE by COVID-19.  Patient agreed to stop anticoagulation at this point as he had completed 3 months of treatment.  8/2021 CTA showed resolution of PE.  ---    Since his last visit, he was unfortunately hospitalized for cervical epidural hematoma.  Hematoma was evacuated by neurosurgery and he currently is in the recovery process. Ambulates with a walker and has a amaya catheter.    Wife accompanies him at this visit.    Allergies :    Review of patient's allergies indicates:  No Known Allergies    Occupation :  Worked in [employer]    Transfusion :  None    Menstrual & obstetric Hx : N/A      Present Meds :   Medication List with Changes/Refills   Current Medications    AZITHROMYCIN (ZITHROMAX) 500 MG TABLET    Take 1 tablet (500 mg total) by mouth once daily.    B COMPLEX VITAMINS CAPSULE    Take 1 capsule by mouth once daily.    ETHAMBUTOL (MYAMBUTOL) 400 MG TAB    Take 2 tablets (800 mg total) by mouth once daily.    NEBULIZER AND COMPRESSOR (Shoutitout COMPRESSOR SYSTEM) DANIEL    use to administer nebulized medication as directed    PRAVASTATIN (PRAVACHOL) 10 MG TABLET    Take 1 tablet (10 mg total) by mouth once daily.    SODIUM CHLORIDE 3% 3 % NEBULIZER SOLUTION    USE 4 ML VIAL IN  NEBULIZER  TWICE DAILY   Discontinued Medications    WARFARIN (COUMADIN) 3 MG TABLET    Take 1-1.5 tablets (3-4.5 mg total) by mouth Daily. As directed by coumadin clinic       Past Medical Hx :  Hx of Prostate cancer; Prostate cancer in 2011 : Prostatectomy; No further Rx. No prior Hx of DVT or PE. No Hx of connective tissue disorder. No CVA or Seizure.   Past Medical Hx :  Past Medical History:   Diagnosis Date    Antiphospholipid syndrome 11/19/2021    Dysphagia     Hx of colonic polyps     followed by GI. Dr. Pham    Hyperlipidemia LDL goal <100     Overweight(278.02)     Prostate cancer september 2011    followed by urology, Dr. Rogers    Type II or unspecified type diabetes mellitus without mention of complication, not stated as uncontrolled     diet controlled       Travel Hx :      Immunization :  Immunization History   Administered Date(s) Administered    COVID-19, MRNA, LN-S, PF (Pfizer) (Purple Cap) 01/13/2021, 04/28/2021, 11/05/2021    Influenza (FLUAD) - Trivalent - Adjuvanted - PF (65+) 09/25/2019    Influenza - High Dose - PF (65 years and older) 09/18/2015, 10/04/2016, 10/23/2017, 09/21/2018    Influenza - Quadrivalent - High Dose - PF (65 years and older) 11/05/2021    Influenza - Quadrivalent - PF *Preferred* (6 months and older) 10/06/2014    Influenza A (H1N1) 2009 Monovalent - IM 01/22/2010    Influenza Split 10/10/2011, 10/06/2014    Pneumococcal Conjugate - 13 Valent 10/28/2015    Pneumococcal Polysaccharide - 23 Valent 07/12/2013    Tdap 03/08/2015    Zoster Recombinant 09/25/2019, 12/30/2019       Family Hx :  Family History   Problem Relation Age of Onset    Colon cancer Mother     Diabetes Mother     Heart disease Father     Mental illness Sister     Diabetes Sister     Other Brother         polio as a child    Pulmonary fibrosis Brother     Diabetes Brother     Myasthenia gravis Brother     Parkinsonism Brother     Diabetes Brother     Dementia Brother      Lung cancer Brother         smoker    Diabetes Maternal Aunt     Diabetes Unknown        Social Hx :  Social History     Socioeconomic History    Marital status:     Number of children: 2   Occupational History    Occupation: tool    Tobacco Use    Smoking status: Former Smoker     Packs/day: 0.25     Years: 5.00     Pack years: 1.25     Quit date: 10/2/1962     Years since quittin.8    Smokeless tobacco: Former User    Tobacco comment: quit age 23   Substance and Sexual Activity    Alcohol use: Not Currently    Drug use: No    Sexual activity: Yes     Partners: Female     Social Determinants of Health     Financial Resource Strain: Low Risk     Difficulty of Paying Living Expenses: Not hard at all   Food Insecurity: No Food Insecurity    Worried About Running Out of Food in the Last Year: Never true    Ran Out of Food in the Last Year: Never true   Transportation Needs: No Transportation Needs    Lack of Transportation (Medical): No    Lack of Transportation (Non-Medical): No   Physical Activity: Insufficiently Active    Days of Exercise per Week: 5 days    Minutes of Exercise per Session: 10 min   Stress: No Stress Concern Present    Feeling of Stress : Not at all   Social Connections: Moderately Isolated    Frequency of Communication with Friends and Family: More than three times a week    Frequency of Social Gatherings with Friends and Family: Once a week    Attends [employer] Services: Never    Active Member of Clubs or Organizations: No    Attends Club or Organization Meetings: Never    Marital Status:    Housing Stability: Low Risk     Unable to Pay for Housing in the Last Year: No    Number of Places Lived in the Last Year: 1    Unstable Housing in the Last Year: No       Surgery : Prostatectomy , at Dr.Bryan Ole. Bilateral Cataract surgery.  Colonoscopy at [facility].     Symptoms :    Review of Systems   Constitutional: Negative for  chills, fever, malaise/fatigue and weight loss.        No change in weight.    HENT: Negative for congestion, hearing loss, nosebleeds, sore throat and tinnitus.    Eyes: Negative for blurred vision, double vision and photophobia.        Wears reading glasses.   Respiratory: Negative for cough, hemoptysis and shortness of breath.    Cardiovascular: Negative for chest pain, palpitations, claudication and leg swelling.   Gastrointestinal: Negative for abdominal pain, blood in stool, constipation, diarrhea, heartburn, nausea and vomiting.   Genitourinary: Positive for frequency. Negative for dysuria, flank pain, hematuria and urgency.        Under Care of Urologist    Musculoskeletal: Negative for back pain, falls, joint pain, myalgias and neck pain.   Skin: Negative for itching and rash.   Neurological: Positive for weakness. Negative for dizziness, tingling, tremors, sensory change, speech change and headaches.   Endo/Heme/Allergies: Negative for environmental allergies and polydipsia. Does not bruise/bleed easily.   Psychiatric/Behavioral: Negative for depression, hallucinations and substance abuse. The patient is not nervous/anxious and does not have insomnia.        Physical Exam :    Physical Exam  Constitutional:       General: He is not in acute distress.     Appearance: Normal appearance. He is not ill-appearing.   HENT:      Head: Normocephalic.      Mouth/Throat:      Mouth: Mucous membranes are moist.      Pharynx: No oropharyngeal exudate or posterior oropharyngeal erythema.   Eyes:      General: No scleral icterus.     Extraocular Movements: Extraocular movements intact.      Conjunctiva/sclera: Conjunctivae normal.      Pupils: Pupils are equal, round, and reactive to light.   Cardiovascular:      Rate and Rhythm: Normal rate.      Heart sounds: No murmur heard.    No friction rub. No gallop.   Pulmonary:      Effort: Pulmonary effort is normal. No respiratory distress.      Breath sounds: No  wheezing, rhonchi or rales.   Abdominal:      General: Abdomen is flat. Bowel sounds are normal. There is no distension.      Palpations: There is no mass.      Tenderness: There is no abdominal tenderness.   Genitourinary:     Comments: +amaya catheter  Musculoskeletal:         General: Normal range of motion.      Cervical back: Normal range of motion and neck supple.      Right lower leg: No edema.      Left lower leg: No edema.   Lymphadenopathy:      Cervical: No cervical adenopathy.   Skin:     General: Skin is warm and dry.      Coloration: Skin is not jaundiced or pale.      Findings: No bruising or erythema.   Neurological:      General: No focal deficit present.      Mental Status: He is alert.      Motor: Weakness present.      Gait: Gait abnormal.   Psychiatric:         Mood and Affect: Mood normal.       No results found for this or any previous visit (from the past 168 hour(s)).    Lab Results   Component Value Date    WBC 6.09 07/13/2022    HGB 12.6 (L) 07/13/2022    HCT 39.6 (L) 07/13/2022    MCV 86 07/13/2022     07/13/2022       BMP  Lab Results   Component Value Date     05/13/2022     05/13/2022    K 4.3 05/13/2022     05/13/2022    CO2 22 (L) 05/13/2022    BUN 15 05/13/2022    CREATININE 0.8 06/08/2022    CALCIUM 9.1 05/13/2022    ANIONGAP 11 05/13/2022    ESTGFRAFRICA >60.0 06/08/2022    EGFRNONAA >60.0 06/08/2022         Assessment & Plan:  Reviewed with patient   1. Positive cardiolipin antibodies     2. Epidural hematoma     3. History of pulmonary embolism     4. Antiphospholipid syndrome     5. Right hemiparesis           Problem List Items Addressed This Visit        Neuro    Epidural hematoma    Overview     C3-C6           Right hemiparesis    Overview     Secondary to cervical hematoma              Hematology    History of pulmonary embolism    Overview     1/30/21 CTA chest left sided pulmonary embolism.  S/p coumadin x 6 months.  Suspect it may be provoked by  COVID 19 infection           Antiphospholipid syndrome    Overview     APS IIb (+aCL Ab).  His DRVVT was positive for lupus anticoagulant on repeat testing (was not initially positive) [repeat hypercoag tests were done while off of anticoagulation].           Current Assessment & Plan     Hospitalized 4/28/22-5/13/22 for compressive epidural hematoma in the cervical spine C3-C6 with symptoms of right hemiparesis. Fortunately neurosurgery was able to evacuate the hematoma.  He is still recovering and now ambulates with a walker.  -discussed with him that I will strongly advise against anticoagulation at this time.  He is agreeable to discontinuing warfarin.  -no routine surveillance for occult dvt/pe required.  Imaging only if he develops symptoms.  -follow up in clinic as needed.              Other    Positive cardiolipin antibodies - Primary              Joaquín Pantoja MD  Hematology Oncology

## 2022-07-22 NOTE — ASSESSMENT & PLAN NOTE
Hospitalized 4/28/22-5/13/22 for compressive epidural hematoma in the cervical spine C3-C6 with symptoms of right hemiparesis. Fortunately neurosurgery was able to evacuate the hematoma.  He is still recovering and now ambulates with a walker.  -discussed with him that I will strongly advise against anticoagulation at this time.  He is agreeable to discontinuing warfarin.  -no routine surveillance for occult dvt/pe required.  Imaging only if he develops symptoms.  -follow up in clinic as needed.

## 2022-07-27 ENCOUNTER — CLINICAL SUPPORT (OUTPATIENT)
Dept: REHABILITATION | Facility: HOSPITAL | Age: 82
End: 2022-07-27
Attending: NEUROLOGICAL SURGERY
Payer: MEDICARE

## 2022-07-27 DIAGNOSIS — Z98.890 STATUS POST LAMINECTOMY: ICD-10-CM

## 2022-07-27 DIAGNOSIS — G81.91 RIGHT HEMIPARESIS: Primary | ICD-10-CM

## 2022-07-27 PROCEDURE — 97163 PT EVAL HIGH COMPLEX 45 MIN: CPT

## 2022-07-28 NOTE — PLAN OF CARE
[facility]   Physical Therapy Initial Evaluation     Date: 7/27/2022   Name: Diego TIRADO [facility] Number: [id number]    Therapy Diagnosis:   Encounter Diagnoses   Name Primary?    Status post laminectomy     Right hemiparesis Yes     Physician: Carlos Miller MD    Physician Orders: PT Eval and Treat   Medical Diagnosis from Referral: Z98.890 (ICD-10-CM) - Status post laminectomy  Evaluation Date: 7/27/2022  Authorization Period Expiration: 08/24/2022  Plan of Care Expiration: 10/24/2022  Progress Note Due: 12th visit   Visit # / Visits authorized: 1/ pending auth   FOTO: 65%/47%    Precautions: Standard; Floyd Catheter since April 28th for urinary retention     Time In: 0100 pm  Time Out: 0215 pm  Total Appointment Time (timed & untimed codes): 75 minutes      SUBJECTIVE     Date of onset: 04/28/2022    History of current condition - Diego reports: Pt with pain beginning at 2:30 am on 4/28/2022; decorticate posturing up upper extremity and unable to move bilateral lower extremities. Pt ws rushing to the ER; Pt's wife states MD states he had bleeding from his head into his spine. 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   Pt stayed in hospital for 16 days then was transferred to [facility] for [facility] for 2 months (OT, PT, speech therapy). Pt was discharged and received OT/PT Home Health for 1 month. Pt biggest complaints involves hand movements, elbow extension, AROM of shoulder and ambulation. Pt only with complaints of pain from shoulder to hand (7/10) - describes as burning pain. Pt to return to MD on August 5th to determine if catheter can be removed. Pt wife states pt struggling with short term memory and speech due to dysphagia after surgery.     Falls: None to note     Imaging, XRAY: 5/13/2022; FINDINGS:  Postoperative changes of multiple  laminectomies and posterior cervical fusion extending from C3-C6 vertebral segment  "identified.  The position alignment is satisfactory and unchanged as compared to the previous study    Prior Therapy: [facility], [facility]   Social History: lives with their spouse  Occupation: Retired in 2014   Prior Level of Function: Independent / Gym 3x/week / Ambulating with no AD  Current Level of Function: Ambulating with RW, amaya catheter, lifting the leg for transfers , getting into bed (bed is thigh height); unable to reach overhead or across body.     Pain:  Current 0/10, worst 7/10, best 0/10   Location: right shoulder    Description: Burning (starts at shoulder and down to hand)   Aggravating Factors: Sitting, Standing, Laying, Night Time, Lifting and Getting out of bed/chair  Easing Factors: pain medication    Patients goals: "return to independence and be able to go back to fitness center without assistance"     Medical History:   Past Medical History:   Diagnosis Date    Antiphospholipid syndrome 11/19/2021    Dysphagia     Hx of colonic polyps     followed by GI. Dr. Pham    Hyperlipidemia LDL goal <100     Overweight(278.02)     Prostate cancer september 2011    followed by urology, Dr. Rogers    Type II or unspecified type diabetes mellitus without mention of complication, not stated as uncontrolled     diet controlled       Surgical History:   Diego Gunter  has a past surgical history that includes Prostatectomy; Cataract extraction, bilateral (2014); Bronchoscopy (N/A, 10/15/2018); and Posterior cervical laminectomy (Bilateral, 4/28/2022).    Medications:   Diego has a current medication list which includes the following prescription(s): azithromycin, b complex vitamins, ethambutol, nebulizer and compressor, pravastatin, sodium chloride 3%, [DISCONTINUED] aspirin, and [DISCONTINUED] omega-3 fatty acids.    Allergies:   Review of patient's allergies indicates:  No Known Allergies       OBJECTIVE     Hip Right  Pain/Dysfunction with Movement    PROM MMT    Flexion 50% " limited  3/5    Extension 80% limited  3-/5 Tested in side lying due to amaya catheter    Abduction 40% limited  3-/5    Adduction NT 3/5    Internal rotation 50% limited  2+/5 Tested in supine   External rotation 60% limited 2+/5 Tested in supine       Knee  Right  Pain/Dysfunction with Movement    AROM PROM MMT    Flexion WFL  WFL 3+/5    Extension WFL  WFL 3/5      Ankle  Right  Pain/Dysfunction with Movement    AROM PROM MMT    Plantarflexion WFL WFL 2/5    Dorsiflexion WFL WFL 3/5    Inversion WFL WFL 3/5    Eversion WFL WFL 3/5      Functional Testin sec STS: 10 reps w/out AD     *unable to perform shoulder shrug or scapular retraction   Shoulder Right  Pain/Dysfunction with Movement    AROM MMT    Flexion 84 degrees  2/5    Abduction 75 degrees  3-/5    Elbow flexion  WNL 3/5    Elbow extension Lacking 35 degrees  1+/5    Internal rotation  (functional)  L4  3/5    External rotation 69 degrees  3/5        Limitation/Restriction for FOTO Survey    Therapist reviewed FOTO scores for Diego Gunter on 2022.   FOTO documents entered into [employer] - see Media section.    Limitation Score: 65% limitation          TREATMENT     Total Treatment time (time-based codes) separate from Evaluation: 00 minutes        PATIENT EDUCATION AND HOME EXERCISES     Education provided:   - HEP will be given next visit     Written Home Exercises Provided: Will be given next visit . Exercises were reviewed and Diego was able to demonstrate them prior to the end of the session.  Diego demonstrated good  understanding of the education provided. See EMR under Patient Instructions for exercises provided during therapy sessions.    ASSESSMENT     Diego is a 81 y.o. male referred to outpatient Physical Therapy with a medical diagnosis of Z98.890 (ICD-10-CM) - Status post laminectomy. Patient presents with Patient presents to PT with pain, decreased cervical/lumbar ROM, decreased right hip/knee/ankle ROM, right shoulder ROM,  decreased strength, poor posture, impaired balance and gait, and functional deficits with most activities; requiring moderate assistance with most activites. Pt to benefit from skilled physical therapy to improve endurance, right lower extremity strength, balance and return pt to PLOF.      Patient prognosis is Good.   Patientt will benefit from skilled outpatient Physical Therapy to address the deficits stated above and in the chart below, provide patient /family education, and to maximize patientt's level of independence.     Plan of care discussed with patient: Yes  Patient's spiritual, cultural and educational needs considered and patient is agreeable to the plan of care and goals as stated below:     Anticipated Barriers for therapy: Floyd Catheter; mild cognitive impairment      Medical Necessity is demonstrated by the following  History  Co-morbidities and personal factors that may impact the plan of care Co-morbidities:   history of CVA and Anxiety, hyperlipidemia, urinary retention    Personal Factors:   no deficits     low   Examination  Body Structures and Functions, activity limitations and participation restrictions that may impact the plan of care Body Regions:   lower extremities  upper extremities    Body Systems:    gross symmetry  ROM  strength  gross coordinated movement  balance  gait  transfers  motor control  motor learning    Participation Restrictions:   Floyd Catheter; mild cognitive impairment    Activity limitations:   Learning and applying knowledge  no deficits    General Tasks and Commands  no deficits    Communication  no deficits    Mobility  lifting and carrying objects  fine hand use (grasping/picking up)  walking  moving around using equipment (WC)  driving (bike, car, motorcycle)    Self care  washing oneself (bathing, drying, washing hands)  caring for body parts (brushing teeth, shaving, grooming)  toileting  dressing    Domestic Life  shopping  cooking  doing house work  (cleaning house, washing dishes, laundry)    Interactions/Relationships   no deficits    Life Areas  no deficits    Community and Social Life  no deficits         low   Clinical Presentation stable and uncomplicated low   Decision Making/ Complexity Score: low     GOALS:   Short Term Goals (6 Weeks):   1.  Independent with initial HEP.  2.  Pt will perform nu-step at level 2.0 x 10 minutes without rests breaks going at least 10 step/min or greater.  3. Increase strength by 1/3 MMT grade in right quadruceps  to increase tolerance for ADL and work activities.  4.  Patient will improve impaired lower extremity manual muscle tests to >/= 3+/5 to improve dynamic right hip/knee support for functional tasks    Long Term Goals (12 Weeks):   1.  Independent with updated HEP.  2.  Pt will perform nu-step at level 4.0 x 15 minutes without rests breaks going at least 12 step/min or greater.  3. Pt will be able to perform TUG in < 10 secs without use of AD placingdemonstrating overall improved functional mobility.   4. Pt will performed 30 sec sit to  15 reps without UE support without LOB backward or posterior LE hooking for functional and safe transfers.   5. Patient will improve impaired lower extremity manual muscle tests to >/= 4/5 to improve dynamic right hip/knee support for functional tasks.   6.  Pt will be able to safely perform and tolerate high level ADL's without LOB.   7. Pt will have 0 falls from start of PT sessions.  8. Pt will ambulate on TM x 10 minutes without use of UE support with 0 LOB at 2.2 mph.  9. Pt will perform floor to stand f/b stand to floor transfer with UE support with stand by assist and no cues for improved dynamic transfer, core stability and fall safety in home and community.  10. Pt will begin some form of community fitness to begin regular and consistent performance of exercise to continue maintenance of gains made in PT.      PLAN   Plan of care Certification: 7/27/2022 to  10/24/2022.    Outpatient Physical Therapy 2 times weekly for 10 weeks to include the following interventions: Gait Training, Neuromuscular Re-ed, Patient Education, Therapeutic Activities and Therapeutic Exercise.     Jaimee Reid PT, DPT       I CERTIFY THE NEED FOR THESE SERVICES FURNISHED UNDER THIS PLAN OF TREATMENT AND WHILE UNDER MY CARE   Physician's comments:     Physician's Signature: ___________________________________________________

## 2022-07-28 NOTE — PROGRESS NOTES
[facility]   Physical Therapy Treatment Note     Name: Diego TIRADO [facility] Number: [id number]    Therapy Diagnosis:   Encounter Diagnoses   Name Primary?    Impaired mobility Yes    Right hemiparesis      Physician: Carlos Miller MD    Visit Date: 8/1/2022    Physician Orders: PT Eval and Treat   Medical Diagnosis from Referral: Z98.890 (ICD-10-CM) - Status post laminectomy  Evaluation Date: 7/27/2022  Authorization Period Expiration: 08/24/2022  Plan of Care Expiration: 10/24/2022  Progress Note Due: 12th visit   Visit # / Visits authorized: 2 / 11    FOTO: 65%/47%     Precautions: Standard; Amaya Catheter since April 28th for urinary retention      Time In: 1000 am  Time Out:  1100 am  Total Appointment Time (timed & untimed codes): 60 minutes (4 TE)     PTA Visit #: 0/5     SUBJECTIVE     Pt reports: No new complaints; will go to the doctor on Friday to determine if amaya catheter can be removed.   He was compliant with home exercise program.  Response to previous treatment: None to note   Functional change: None to note     Pain: 0/10  Location: right side weakness       OBJECTIVE     Objective Measures updated at progress report unless specified.     Treatment       Diego received the treatments listed below:      THERAPEUTIC EXERCISES to develop strength, endurance, ROM and flexibility for 55 minutes including :  + Aerobic activity and endurance training for reciprocal motion of lower limbs on Nustep x 10 min at level 2.0 at > or equal to 50 spm w/o rest to increase mobility, blood flow and improve tissue tolerance.   - SAQ (medium bolster); 3 x 10 reps; 3 sec hold - 2 lb AW (cone used to prevent ER)   - LAQ; 3 x 10 reps; 3 sec hold - 2 lb AW   - SLR; 2 x 10 reps   - Supine Clams; 3 x 10 reps; 5 sec hold   - Bridging with YTB 2 x 10 reps; 3 sec hold   - Seated marching with YTB; 2 x 10 reps - MAX CUEING FROM PT to prevent hip ER, knee flexion and ankle inversion.     Next  visit:   - Standing calf raise    - AAROM shoulder flexion  - AAROM shoulder abduction   - elbow extension hang   - shoulder shrugs  - scapular retractions       Patient Education and Home Exercises     Home Exercises Provided and Patient Education Provided     Education provided:   - Will be given at a later visit     Written Home Exercises Provided: Will be given at a later visit . Exercises were reviewed and Diego was able to demonstrate them prior to the end of the session.  Diego demonstrated good  understanding of the education provided. See EMR under Patient Instructions for exercises provided during therapy sessions    ASSESSMENT     Diego with appropriate muscle response at the end of treatment. Pt requires max cueing with supine table exercises to prevent hip ER and ankle inversion. Pt with cog-wheel movements throughout treatment due to poor motor control. Will continue to focus on hip and quad strengthening as tolerated by patient.     Diego Is progressing well towards his goals.   Pt prognosis is Excellent.     Pt will continue to benefit from skilled outpatient physical therapy to address the deficits listed in the problem list box on initial evaluation, provide pt/family education and to maximize pt's level of independence in the home and community environment.     Pt's spiritual, cultural and educational needs considered and pt agreeable to plan of care and goals.     Anticipated barriers to physical therapy: none to note     GOALS:   Short Term Goals (6 Weeks):   1.  Independent with initial HEP.  2.  Pt will perform nu-step at level 2.0 x 10 minutes without rests breaks going at least 10 step/min or greater.  3. Increase strength by 1/3 MMT grade in right quadruceps  to increase tolerance for ADL and work activities.  4.  Patient will improve impaired lower extremity manual muscle tests to >/= 3+/5 to improve dynamic right hip/knee support for functional tasks     Long Term Goals (12 Weeks):    1.  Independent with updated HEP.  2.  Pt will perform nu-step at level 4.0 x 15 minutes without rests breaks going at least 12 step/min or greater.  3. Pt will be able to perform TUG in < 10 secs without use of AD placingdemonstrating overall improved functional mobility.   4. Pt will performed 30 sec sit to  15 reps without UE support without LOB backward or posterior LE hooking for functional and safe transfers.   5. Patient will improve impaired lower extremity manual muscle tests to >/= 4/5 to improve dynamic right hip/knee support for functional tasks.   6.  Pt will be able to safely perform and tolerate high level ADL's without LOB.   7. Pt will have 0 falls from start of PT sessions.  8. Pt will ambulate on TM x 10 minutes without use of UE support with 0 LOB at 2.2 mph.  9. Pt will perform floor to stand f/b stand to floor transfer with UE support with stand by assist and no cues for improved dynamic transfer, core stability and fall safety in home and community.  10. Pt will begin some form of community fitness to begin regular and consistent performance of exercise to continue maintenance of gains made in PT.    PLAN     Emphasize lower extremity strength and improve gait pattern.     Jaimee Reid, PT, DPT

## 2022-08-01 ENCOUNTER — CLINICAL SUPPORT (OUTPATIENT)
Dept: REHABILITATION | Facility: HOSPITAL | Age: 82
End: 2022-08-01
Payer: MEDICARE

## 2022-08-01 DIAGNOSIS — G81.91 RIGHT HEMIPARESIS: ICD-10-CM

## 2022-08-01 DIAGNOSIS — Z74.09 IMPAIRED MOBILITY: Primary | ICD-10-CM

## 2022-08-01 PROCEDURE — 97110 THERAPEUTIC EXERCISES: CPT

## 2022-08-02 ENCOUNTER — TELEPHONE (OUTPATIENT)
Dept: INFECTIOUS DISEASES | Facility: CLINIC | Age: 82
End: 2022-08-02
Payer: MEDICARE

## 2022-08-02 DIAGNOSIS — A31.0 MYCOBACTERIUM AVIUM-INTRACELLULARE COMPLEX: Primary | ICD-10-CM

## 2022-08-02 RX ORDER — ETHAMBUTOL HYDROCHLORIDE 400 MG/1
800 TABLET, FILM COATED ORAL DAILY
Qty: 60 TABLET | Refills: 5 | Status: SHIPPED | OUTPATIENT
Start: 2022-08-02 | End: 2022-08-26 | Stop reason: SDUPTHER

## 2022-08-02 NOTE — TELEPHONE ENCOUNTER
8/2/22    Spoke to patient and advised Rx was refilled by Dr Rao today @ [id number].       ----- Message from Nahomy Sigala LPN sent at 8/2/2022  4:02 PM CDT -----  Regarding: FW: aubrey matamoros about Rx needing to be filled    ----- Message -----  From: Hosea Hernandez  Sent: 8/2/2022   3:27 PM CDT  To: Maira Guevara Staff  Subject: call saturnino about Rx needing to be filled              The Pt's wife states that the Pt will need his Rx-ethambutoL (MYAMBUTOL) 400 MG Tab    The Pt's wife states that this Rx is normally taken at the same time with another Rx.    Please contact the caller to confirm if you will continue to order this Rx    Caller's [phone]

## 2022-08-02 NOTE — TELEPHONE ENCOUNTER
----- Message from Mitra Flanagan sent at 8/2/2022 11:18 AM CDT -----  Regarding: refill  Contact: Addis (wife) @ [phone]  Rx Refill/Request     Is this a Refill or New Rx:  refill    Rx Name and Strength:  ethambutoL (MYAMBUTOL) 400 MG Tab    Preferred Pharmacy with phone number:     [facility] - [place], [place] - [address]  [address] [facility] [address]  Phone: [phone] Fax: [phone]      Communication Preference:Addis (wife) @ [phone]  Additional Information: caller says patient is out of the medication and is needing refill today. Please call.

## 2022-08-03 ENCOUNTER — TELEPHONE (OUTPATIENT)
Dept: INFECTIOUS DISEASES | Facility: CLINIC | Age: 82
End: 2022-08-03
Payer: MEDICARE

## 2022-08-04 ENCOUNTER — TELEPHONE (OUTPATIENT)
Dept: NEUROSURGERY | Facility: CLINIC | Age: 82
End: 2022-08-04
Payer: MEDICARE

## 2022-08-04 ENCOUNTER — CLINICAL SUPPORT (OUTPATIENT)
Dept: REHABILITATION | Facility: HOSPITAL | Age: 82
End: 2022-08-04
Payer: MEDICARE

## 2022-08-04 DIAGNOSIS — G81.91 RIGHT HEMIPARESIS: ICD-10-CM

## 2022-08-04 DIAGNOSIS — Z74.09 IMPAIRED MOBILITY: Primary | ICD-10-CM

## 2022-08-04 DIAGNOSIS — Z98.890 STATUS POST LAMINECTOMY: Primary | ICD-10-CM

## 2022-08-04 PROCEDURE — 97110 THERAPEUTIC EXERCISES: CPT | Mod: CQ

## 2022-08-04 NOTE — PROGRESS NOTES
[facility]   Physical Therapy Treatment Note     Name: Diego TIRADO [facility] Number: [id number]    Therapy Diagnosis:   Encounter Diagnoses   Name Primary?    Impaired mobility Yes    Right hemiparesis      Physician: Carlos Miller MD    Visit Date: 8/4/2022    Physician Orders: PT Eval and Treat   Medical Diagnosis from Referral: Z98.890 (ICD-10-CM) - Status post laminectomy  Evaluation Date: 7/27/2022  Authorization Period Expiration: 08/24/2022  Plan of Care Expiration: 10/24/2022  Progress Note Due: 12th visit   Visit # / Visits authorized: 2 / 11 + eval    PTA Visit #: 1 / 5      Time In: 1055  Time Out: 1200  Total Appointment Time (timed & untimed codes): 65 minutes (4 TE)     Precautions: Standard; Floyd Catheter since April 28th for urinary retention     SUBJECTIVE     Patient reports: that he has been working on walking with his foot straight and not turned out. He is seeing MD tomorrow and is hoping for catheter removal.  He was compliant with home exercise program.  Response to previous treatment: good; appropriate muscle response  Functional change: none to note today    Pain: 0/10, currently  Location: Right side weakness       OBJECTIVE     Objective Measures updated at progress report unless specified.     Treatment     Diego received the treatments listed below:      THERAPEUTIC EXERCISES to develop strength, endurance, ROM and flexibility for 65 minutes including :  - Aerobic activity and endurance training for reciprocal motion of lower limbs on Nustep x 10 minutes at Level 3.0 at > or equal to 50 spm w/o rest to increase mobility, blood flow and improve tissue tolerance.   - SAQ (medium bolster); 3 x 10 reps on each LE with 3 sec hold, 2 lb AW (cone used to prevent ER)   - Supine Clamshells with Yellow TB; 3 x 10 reps; 5 sec hold   - HL Bridging with Yellow TB; 2 x 10 reps, 3 sec hold   - SLR; 2 x 10 reps   - LAQ; 3 x 10 reps, 3 sec hold with 2 lb AW   - Seated  marching with Yellow TB; 2 x 10 reps - MAX CUEING FROM PT to prevent hip ER, knee flexion and ankle inversion    +AAROM Shoulder Flexion with cane + 1lb AW; 20 reps, 3 second hold  +Seated shoulder shrugs; 2 x 10 reps  +Seated scapular retractions; 2 x 10 reps, 3 second hold  +Standing calf raises; 3 x 10 reps    Next visit:   - AAROM shoulder abduction   - Elbow extension hang     Patient Education and Home Exercises     Home Exercises Provided and Patient Education Provided     Education provided:   - Will be given at a later visit     Written Home Exercises Provided: Will be given at a later visit . Exercises were reviewed and Diego was able to demonstrate them prior to the end of the session.  Diego demonstrated good  understanding of the education provided. See EMR under Patient Instructions for exercises provided during therapy sessions    ASSESSMENT     Mr. Cortez had good tolerance to exercises performed. Visible muscle fasciculations and cog-wheel movements due to poor motor control. Mod cueing required with postural exercises for proper muscle activation and technique. He demonstrates rounded shoulder and forward head posture in seated.   Diego Is progressing well towards his goals.   Pt prognosis is Excellent.     Pt will continue to benefit from skilled outpatient physical therapy to address the deficits listed in the problem list box on initial evaluation, provide pt/family education and to maximize pt's level of independence in the home and community environment.     Pt's spiritual, cultural and educational needs considered and pt agreeable to plan of care and goals.     Anticipated barriers to physical therapy: none to note     GOALS:   Short Term Goals (6 Weeks):   1.  Independent with initial HEP.  2.  Pt will perform nu-step at level 2.0 x 10 minutes without rests breaks going at least 10 step/min or greater.  3. Increase strength by 1/3 MMT grade in right quadruceps  to increase tolerance for  ADL and work activities.  4.  Patient will improve impaired lower extremity manual muscle tests to >/= 3+/5 to improve dynamic right hip/knee support for functional tasks     Long Term Goals (12 Weeks):   1.  Independent with updated HEP.  2.  Pt will perform nu-step at level 4.0 x 15 minutes without rests breaks going at least 12 step/min or greater.  3. Pt will be able to perform TUG in < 10 secs without use of AD placingdemonstrating overall improved functional mobility.   4. Pt will performed 30 sec sit to  15 reps without UE support without LOB backward or posterior LE hooking for functional and safe transfers.   5. Patient will improve impaired lower extremity manual muscle tests to >/= 4/5 to improve dynamic right hip/knee support for functional tasks.   6.  Pt will be able to safely perform and tolerate high level ADL's without LOB.   7. Pt will have 0 falls from start of PT sessions.  8. Pt will ambulate on TM x 10 minutes without use of UE support with 0 LOB at 2.2 mph.  9. Pt will perform floor to stand f/b stand to floor transfer with UE support with stand by assist and no cues for improved dynamic transfer, core stability and fall safety in home and community.  10. Pt will begin some form of community fitness to begin regular and consistent performance of exercise to continue maintenance of gains made in PT.    PLAN     Emphasize lower extremity strength and improve gait pattern.     Tracey Ruiz, PTA

## 2022-08-04 NOTE — TELEPHONE ENCOUNTER
"Spoke to pt's wife. She informed me that pt's physical therapist recommends OT to supplement the PT. I told her Dr. Miller's note does say "aggressive PT OT". I told her I will get someone to put in the order for OT in addition to the existing PT order as per Dr. Miller's and PT's request. She was grateful and understands.           ----- Message from Jolie Valle sent at 8/4/2022  9:32 AM CDT -----  Regarding: PT HAS PT BUT WIFE IS CALLING TO SEE ABOUT PT ORDERS  Contact: Pt's wife  Pt's requesting a call back regarding scheduling...  Pt's right arm is very weak.. Pt was not able to sign this am..     Confirmed contact info below:  Contact Name: Diego Gunter  Phone Number: [phone]         "

## 2022-08-05 ENCOUNTER — OFFICE VISIT (OUTPATIENT)
Dept: UROLOGY | Facility: CLINIC | Age: 82
End: 2022-08-05
Payer: MEDICARE

## 2022-08-05 VITALS
HEART RATE: 71 BPM | SYSTOLIC BLOOD PRESSURE: 127 MMHG | DIASTOLIC BLOOD PRESSURE: 75 MMHG | HEIGHT: 69 IN | BODY MASS INDEX: 23.7 KG/M2 | WEIGHT: 160 LBS

## 2022-08-05 DIAGNOSIS — C61 PROSTATE CANCER: ICD-10-CM

## 2022-08-05 DIAGNOSIS — Z98.890 H/O CERVICAL SPINE SURGERY: ICD-10-CM

## 2022-08-05 DIAGNOSIS — R33.9 URINARY RETENTION: Primary | ICD-10-CM

## 2022-08-05 DIAGNOSIS — Z90.79 HISTORY OF RADICAL PROSTATECTOMY: ICD-10-CM

## 2022-08-05 DIAGNOSIS — Z97.8 FOLEY CATHETER IN PLACE: ICD-10-CM

## 2022-08-05 PROCEDURE — 3074F SYST BP LT 130 MM HG: CPT | Mod: CPTII,S$GLB,, | Performed by: NURSE PRACTITIONER

## 2022-08-05 PROCEDURE — 3074F PR MOST RECENT SYSTOLIC BLOOD PRESSURE < 130 MM HG: ICD-10-PCS | Mod: CPTII,S$GLB,, | Performed by: NURSE PRACTITIONER

## 2022-08-05 PROCEDURE — 1126F AMNT PAIN NOTED NONE PRSNT: CPT | Mod: CPTII,S$GLB,, | Performed by: NURSE PRACTITIONER

## 2022-08-05 PROCEDURE — 99999 PR PBB SHADOW E&M-EST. PATIENT-LVL III: ICD-10-PCS | Mod: PBBFAC,,, | Performed by: NURSE PRACTITIONER

## 2022-08-05 PROCEDURE — 99215 PR OFFICE/OUTPT VISIT, EST, LEVL V, 40-54 MIN: ICD-10-PCS | Mod: S$GLB,,, | Performed by: NURSE PRACTITIONER

## 2022-08-05 PROCEDURE — 1126F PR PAIN SEVERITY QUANTIFIED, NO PAIN PRESENT: ICD-10-PCS | Mod: CPTII,S$GLB,, | Performed by: NURSE PRACTITIONER

## 2022-08-05 PROCEDURE — 3288F PR FALLS RISK ASSESSMENT DOCUMENTED: ICD-10-PCS | Mod: CPTII,S$GLB,, | Performed by: NURSE PRACTITIONER

## 2022-08-05 PROCEDURE — 1160F RVW MEDS BY RX/DR IN RCRD: CPT | Mod: CPTII,S$GLB,, | Performed by: NURSE PRACTITIONER

## 2022-08-05 PROCEDURE — 1157F PR ADVANCE CARE PLAN OR EQUIV PRESENT IN MEDICAL RECORD: ICD-10-PCS | Mod: CPTII,S$GLB,, | Performed by: NURSE PRACTITIONER

## 2022-08-05 PROCEDURE — 1159F MED LIST DOCD IN RCRD: CPT | Mod: CPTII,S$GLB,, | Performed by: NURSE PRACTITIONER

## 2022-08-05 PROCEDURE — 1101F PT FALLS ASSESS-DOCD LE1/YR: CPT | Mod: CPTII,S$GLB,, | Performed by: NURSE PRACTITIONER

## 2022-08-05 PROCEDURE — 3078F PR MOST RECENT DIASTOLIC BLOOD PRESSURE < 80 MM HG: ICD-10-PCS | Mod: CPTII,S$GLB,, | Performed by: NURSE PRACTITIONER

## 2022-08-05 PROCEDURE — 1159F PR MEDICATION LIST DOCUMENTED IN MEDICAL RECORD: ICD-10-PCS | Mod: CPTII,S$GLB,, | Performed by: NURSE PRACTITIONER

## 2022-08-05 PROCEDURE — 3288F FALL RISK ASSESSMENT DOCD: CPT | Mod: CPTII,S$GLB,, | Performed by: NURSE PRACTITIONER

## 2022-08-05 PROCEDURE — 99215 OFFICE O/P EST HI 40 MIN: CPT | Mod: S$GLB,,, | Performed by: NURSE PRACTITIONER

## 2022-08-05 PROCEDURE — 99999 PR PBB SHADOW E&M-EST. PATIENT-LVL III: CPT | Mod: PBBFAC,,, | Performed by: NURSE PRACTITIONER

## 2022-08-05 PROCEDURE — 1157F ADVNC CARE PLAN IN RCRD: CPT | Mod: CPTII,S$GLB,, | Performed by: NURSE PRACTITIONER

## 2022-08-05 PROCEDURE — 1160F PR REVIEW ALL MEDS BY PRESCRIBER/CLIN PHARMACIST DOCUMENTED: ICD-10-PCS | Mod: CPTII,S$GLB,, | Performed by: NURSE PRACTITIONER

## 2022-08-05 PROCEDURE — 1101F PR PT FALLS ASSESS DOC 0-1 FALLS W/OUT INJ PAST YR: ICD-10-PCS | Mod: CPTII,S$GLB,, | Performed by: NURSE PRACTITIONER

## 2022-08-05 PROCEDURE — 3078F DIAST BP <80 MM HG: CPT | Mod: CPTII,S$GLB,, | Performed by: NURSE PRACTITIONER

## 2022-08-05 RX ORDER — OMEPRAZOLE 10 MG/1
CAPSULE, DELAYED RELEASE ORAL
COMMUNITY
Start: 2022-06-13 | End: 2022-10-07 | Stop reason: CLARIF

## 2022-08-05 RX ORDER — SODIUM CHLORIDE FOR INHALATION 3 %
VIAL, NEBULIZER (ML) INHALATION
COMMUNITY
Start: 2022-06-13 | End: 2022-10-03

## 2022-08-05 NOTE — PROGRESS NOTES
CHIEF COMPLAINT:    Diego Gunter is a 81 y.o. male presents today for Urinary Retention.     HISTORY OF PRESENTING ILLINESS:    Diego Gunter is a 81 y.o. male with PMH antiphospholipid syndrome on chronic coumadin for left PE, DM2, prostate ca s/p prostatectomy 2011, MAC pneumonia dx Fall 2018, mild cognitive impairment, and HLD  04/28/2022 underwent Cervical Spine Surgery/compression of epidural hematoma of cervical spine.   Urology was consulted due to severe hydronephrosis; high PVR.   Last clinic visit on 6/08/2022 to establish care after the hospital.  We exchanged out his amaya; discussed a plan.   07/05/2022 showed resolution of Hydronephrosis. Complex renal cyst and AML.    06/08/2022 amaya exchange.  07/08/2022 was last amaya exchange.     Here today for a VT.  He voices no new complaints.  Amaya has been draining well.  He is ready for the amaya to come out.                 URINARY RETENTION HISTORY     04/28/2022 He was transferred from OSH for compressive epidural hematoma in the cervical spine where he had surgery with Dr. Miller.     Posterior approach for a C3, C4, C5, and C6 laminectomy in medial facetectomy for decompression of spinal cord 2.   Removal epidural hematoma 3. C3-4, C4-5, C5-6 posterolateral instrumented fusion morselized allograft local autograft from laminectomy      05/04/2022 CTAP showed:  Kidneys are stable in size in the expected location.    -Massively dilated urinary bladder measuring 24 cm and resulting in moderate hydroureteronephrosis bilaterally.   -Focus of nondependent air in the urinary bladder     Urology consulted for b/l hydro. During this admission, he has had multiple catheter placements with high volume UOP. When speaking to him, he was hard to understand upon questioning. He has not had any urinary issues at home. No dysuria or hematuria. He does not have a history of retention.     He was to keep amaya cath in place; f/u in Physician clinic for VT; US of  kidneys before;   He did not follow up.     Was at [facility]; was followed by [facility].  A report given from 6/1/2022; states amaya had been out a couple of days.  Nursing staff reported urine dribbling; little output     Family reports that since he went to [facility], the follow up care was lost.   He is now at home.           REVIEW OF SYSTEMS:  Review of Systems   Constitutional: Negative.  Negative for chills and fever.   Eyes: Negative for double vision.   Respiratory: Negative for cough and shortness of breath.    Cardiovascular: Negative for chest pain and palpitations.   Gastrointestinal: Negative for nausea and vomiting.   Genitourinary: Negative.  Negative for dysuria, flank pain and hematuria.        Amaya draining well.  Clear urine     Musculoskeletal:        Getting stronger; steadier gate.  Uses a walker but only for safety/stabilty.     Neurological: Negative for dizziness.         PATIENT HISTORY:    Past Medical History:   Diagnosis Date    Antiphospholipid syndrome 11/19/2021    Dysphagia     Hx of colonic polyps     followed by GI. Dr. Pham    Hyperlipidemia LDL goal <100     Overweight(278.02)     Prostate cancer september 2011    followed by urology, Dr. Rogers    Type II or unspecified type diabetes mellitus without mention of complication, not stated as uncontrolled     diet controlled       Past Surgical History:   Procedure Laterality Date    BRONCHOSCOPY N/A 10/15/2018    Procedure: BRONCHOSCOPY;  Surgeon: Pratibha Diagnostic Provider;  Location: [facility] OR [address] [place] [address];  Service: Anesthesiology;  Laterality: N/A;    CATARACT EXTRACTION, BILATERAL  2014    POSTERIOR CERVICAL LAMINECTOMY Bilateral 4/28/2022    Procedure: LAMINECTOMY, SPINE, CERVICAL, POSTERIOR APPROACH C3-6;  Surgeon: Carlos Miller MD;  Location: [facility] OR [address] [place] [address];  Service: Neurosurgery;  Laterality: Bilateral;    PROSTATECTOMY         Family History   Problem Relation Age of Onset    Colon  cancer Mother     Diabetes Mother     Heart disease Father     Mental illness Sister     Diabetes Sister     Other Brother         polio as a child    Pulmonary fibrosis Brother     Diabetes Brother     Myasthenia gravis Brother     Parkinsonism Brother     Diabetes Brother     Dementia Brother     Lung cancer Brother         smoker    Diabetes Maternal Aunt     Diabetes Unknown        Social History     Socioeconomic History    Marital status:     Number of children: 2   Occupational History    Occupation: [employer]    Tobacco Use    Smoking status: Former Smoker     Packs/day: 0.25     Years: 5.00     Pack years: 1.25     Quit date: 10/2/1962     Years since quittin.8    Smokeless tobacco: Former User    Tobacco comment: quit age 23   Substance and Sexual Activity    Alcohol use: Not Currently    Drug use: No    Sexual activity: Yes     Partners: Female     Social Determinants of Health     Financial Resource Strain: Low Risk     Difficulty of Paying Living Expenses: Not hard at all   Food Insecurity: No Food Insecurity    Worried About Running Out of Food in the Last Year: Never true    Ran Out of Food in the Last Year: Never true   Transportation Needs: No Transportation Needs    Lack of Transportation (Medical): No    Lack of Transportation (Non-Medical): No   Physical Activity: Insufficiently Active    Days of Exercise per Week: 5 days    Minutes of Exercise per Session: 10 min   Stress: No Stress Concern Present    Feeling of Stress : Not at all   Social Connections: Moderately Isolated    Frequency of Communication with Friends and Family: More than three times a week    Frequency of Social Gatherings with Friends and Family: Once a week    Attends Restorationist Services: Never    Active Member of Clubs or Organizations: No    Attends Club or Organization Meetings: Never    Marital Status:    Housing Stability: Low Risk     Unable to Pay for Housing  in the Last Year: No    Number of Places Lived in the Last Year: 1    Unstable Housing in the Last Year: No       Allergies:  Patient has no known allergies.    Medications:    Current Outpatient Medications:     azithromycin (ZITHROMAX) 500 MG tablet, Take 1 tablet (500 mg total) by mouth once daily., Disp: 30 tablet, Rfl: 5    b complex vitamins capsule, Take 1 capsule by mouth once daily., Disp: , Rfl:     ethambutoL (MYAMBUTOL) 400 MG Tab, Take 2 tablets (800 mg total) by mouth once daily., Disp: 60 tablet, Rfl: 5    nebulizer and compressor (Bfly COMPRESSOR SYSTEM) Marcy, use to administer nebulized medication as directed, Disp: 1 each, Rfl: 0    omeprazole (PRILOSEC) 10 MG capsule, ONE CAPSULE BY MOUTH once a day as needed, Disp: , Rfl:     pravastatin (PRAVACHOL) 10 MG tablet, Take 1 tablet (10 mg total) by mouth once daily., Disp: 90 tablet, Rfl: 0    sodium chloride 3% 3 % nebulizer solution, USE 4 ML VIAL IN NEBULIZER  TWICE DAILY, Disp: 480 mL, Rfl: 11    sodium chloride 3% 3 % nebulizer solution, SMARTSIG:Via Inhaler, Disp: , Rfl:     PHYSICAL EXAMINATION:  Physical Exam  Vitals and nursing note reviewed.   Constitutional:       General: He is awake.      Appearance: Normal appearance.   HENT:      Head: Normocephalic.      Right Ear: External ear normal.      Left Ear: External ear normal.      Nose: Nose normal.   Cardiovascular:      Rate and Rhythm: Normal rate.   Pulmonary:      Effort: Pulmonary effort is normal. No respiratory distress.   Abdominal:      Tenderness: There is no abdominal tenderness. There is no right CVA tenderness or left CVA tenderness.   Genitourinary:     Penis: Normal.       Testes: Normal.   Musculoskeletal:         General: Normal range of motion.      Cervical back: Normal range of motion.   Skin:     General: Skin is warm and dry.   Neurological:      General: No focal deficit present.      Mental Status: He is alert and oriented to person, place, and time.    Psychiatric:         Mood and Affect: Mood normal.         Behavior: Behavior is cooperative.           LABS:          Lab Results   Component Value Date    PSA 0.02 10/28/2015    PSADIAG 0.02 06/08/2022             IMPRESSION:    Encounter Diagnoses   Name Primary?    Urinary retention Yes    Prostate cancer     Floyd catheter in place     History of radical prostatectomy     H/O cervical spine surgery          Assessment:       1. Urinary retention    2. Prostate cancer    3. Floyd catheter in place    4. History of radical prostatectomy    5. H/O cervical spine surgery        Plan:         I spent 40 minutes with the patient of which more than half was spent in direct consultation with the patient in regards to our treatment and plan.  We addressed the office findings and recent labs.   Education and recommendations of today's plan of care including home remedies and needed follow up with PCP.   We discussed the chief complaint/LUTS and the possible contributory factors.   Discussed the plan of today.  VT passed in the office.   I discussed with him and family the plan for today.  He needs to be drinking plenty of water and make sure he is urinating.   If unable to urinate then RTC for evaluation; do not call; just come on.  We do not want him to be in retention and not aware as before.  Want to avoid an ER visit.  Will check on him before leaving today.  If voiding, and no issues over the weekend will have him swing by for a quick Bladder Scan  Recommended lifestyle modifications with proper, healthy diet, good hydration if no fluid restrictions; reducing bladder irritants.   Benefits of regular exercise approved by PCP.       stated

## 2022-08-08 ENCOUNTER — LAB VISIT (OUTPATIENT)
Dept: LAB | Facility: HOSPITAL | Age: 82
End: 2022-08-08
Attending: INTERNAL MEDICINE
Payer: MEDICARE

## 2022-08-08 ENCOUNTER — PATIENT MESSAGE (OUTPATIENT)
Dept: UROLOGY | Facility: CLINIC | Age: 82
End: 2022-08-08
Payer: MEDICARE

## 2022-08-08 DIAGNOSIS — A31.0 MYCOBACTERIUM AVIUM-INTRACELLULARE COMPLEX: ICD-10-CM

## 2022-08-08 PROCEDURE — 87206 SMEAR FLUORESCENT/ACID STAI: CPT | Performed by: INTERNAL MEDICINE

## 2022-08-08 PROCEDURE — 87149 DNA/RNA DIRECT PROBE: CPT | Performed by: INTERNAL MEDICINE

## 2022-08-08 PROCEDURE — 87118 MYCOBACTERIC IDENTIFICATION: CPT | Performed by: INTERNAL MEDICINE

## 2022-08-08 PROCEDURE — 87015 SPECIMEN INFECT AGNT CONCNTJ: CPT | Performed by: INTERNAL MEDICINE

## 2022-08-08 PROCEDURE — 87186 SC STD MICRODIL/AGAR DIL: CPT | Performed by: INTERNAL MEDICINE

## 2022-08-08 PROCEDURE — 87116 MYCOBACTERIA CULTURE: CPT | Performed by: INTERNAL MEDICINE

## 2022-08-09 ENCOUNTER — CLINICAL SUPPORT (OUTPATIENT)
Dept: REHABILITATION | Facility: HOSPITAL | Age: 82
End: 2022-08-09
Payer: MEDICARE

## 2022-08-09 ENCOUNTER — CLINICAL SUPPORT (OUTPATIENT)
Dept: UROLOGY | Facility: CLINIC | Age: 82
End: 2022-08-09
Payer: MEDICARE

## 2022-08-09 DIAGNOSIS — C61 PROSTATE CANCER: ICD-10-CM

## 2022-08-09 DIAGNOSIS — Z90.79 HISTORY OF RADICAL PROSTATECTOMY: ICD-10-CM

## 2022-08-09 DIAGNOSIS — G81.91 RIGHT HEMIPARESIS: Primary | ICD-10-CM

## 2022-08-09 DIAGNOSIS — R33.9 URINARY RETENTION: Primary | ICD-10-CM

## 2022-08-09 LAB
BILIRUB SERPL-MCNC: NORMAL MG/DL
BLOOD URINE, POC: NORMAL
CLARITY, POC UA: CLEAR
COLOR, POC UA: YELLOW
GLUCOSE UR QL STRIP: NORMAL
KETONES UR QL STRIP: NORMAL
LEUKOCYTE ESTERASE URINE, POC: NORMAL
NITRITE, POC UA: NORMAL
PH, POC UA: 5
POC RESIDUAL URINE VOLUME: 212 ML (ref 0–100)
PROTEIN, POC: NORMAL
SPECIFIC GRAVITY, POC UA: 1
UROBILINOGEN, POC UA: NORMAL

## 2022-08-09 PROCEDURE — 97110 THERAPEUTIC EXERCISES: CPT

## 2022-08-09 PROCEDURE — 81002 URINALYSIS NONAUTO W/O SCOPE: CPT | Mod: S$GLB,,, | Performed by: NURSE PRACTITIONER

## 2022-08-09 PROCEDURE — 51798 US URINE CAPACITY MEASURE: CPT | Mod: S$GLB,,, | Performed by: NURSE PRACTITIONER

## 2022-08-09 PROCEDURE — 81002 POCT URINE DIPSTICK WITHOUT MICROSCOPE: ICD-10-PCS | Mod: S$GLB,,, | Performed by: NURSE PRACTITIONER

## 2022-08-09 PROCEDURE — 99999 PR PBB SHADOW E&M-EST. PATIENT-LVL II: ICD-10-PCS | Mod: PBBFAC,,, | Performed by: NURSE PRACTITIONER

## 2022-08-09 PROCEDURE — 99999 PR PBB SHADOW E&M-EST. PATIENT-LVL II: CPT | Mod: PBBFAC,,, | Performed by: NURSE PRACTITIONER

## 2022-08-09 PROCEDURE — 51798 POCT BLADDER SCAN: ICD-10-PCS | Mod: S$GLB,,, | Performed by: NURSE PRACTITIONER

## 2022-08-09 NOTE — PROGRESS NOTES
[facility]   Physical Therapy Treatment Note     Name: Diego TIRADO [facility] Number: [id number]    Therapy Diagnosis:   Encounter Diagnosis   Name Primary?    Right hemiparesis Yes     Physician: Carlos Miller MD    Visit Date: 8/9/2022    Physician Orders: PT Eval and Treat   Medical Diagnosis from Referral: Z98.890 (ICD-10-CM) - Status post laminectomy  Evaluation Date: 7/27/2022  Authorization Period Expiration: 08/24/2022  Plan of Care Expiration: 10/24/2022  Progress Note Due: 12th visit   Visit # / Visits authorized: 3 / 11 + eval    PTA Visit #: 0 / 5      Time In: 0900 am   Time Out: 1000 am   Total Appointment Time (timed & untimed codes): 60 minutes (4 TE)     Precautions: Standard; Floyd Catheter since April 28th for urinary retention (removed on 08/05/2022)     SUBJECTIVE     Patient reports: Happy to have his catheter removed; will be starting OT next Monday.   He was compliant with home exercise program.  Response to previous treatment: good; appropriate muscle response  Functional change: none to note today    Pain: 0/10, currently  Location: Right side weakness       OBJECTIVE     Objective Measures updated at progress report unless specified.     Treatment     Diego received the treatments listed below:      THERAPEUTIC EXERCISES to develop strength, endurance, ROM and flexibility for 65 minutes including :  - Aerobic activity and endurance training for reciprocal motion of lower limbs on Nustep x 10 minutes at Level 3.0 at > or equal to 50 spm w/o rest to increase mobility, blood flow and improve tissue tolerance.   - SAQ (medium bolster); 3 x 10 reps on each LE with 3 sec hold, 2 lb AW (cone used to prevent ER)   - Supine Clamshells with red TB; 3 x 10 reps; 5 sec hold   - HL Bridging with red TB; 3 x 10 reps, 3 sec hold   - SLR; 3 x 10 reps (rolled towel under knee for quad set)   - LAQ; 3 x 10 reps, 3 sec hold with 2 lb AW   - Bilateral seated marching with red  TB; 2 x 10 reps - MAX CUEING FROM PT to prevent hip ER, knee flexion and ankle inversion  - SUPINE HIP IR (red TB); 2 x 10 reps   - SL hip abduction; 2 x 10 reps (cone used) - pt hand to hold hip in place    Patient Education and Home Exercises     Home Exercises Provided and Patient Education Provided     Education provided:   - Will be given at a later visit     Written Home Exercises Provided: Will be given at a later visit . Exercises were reviewed and Diego was able to demonstrate them prior to the end of the session.  Diego demonstrated good  understanding of the education provided. See EMR under Patient Instructions for exercises provided during therapy sessions    ASSESSMENT     Mr. Cortez had good tolerance to exercises performed. Marked difficulty with cog-wheel movements due to poor motor control with SLR and SL hip abduction. Use of cone and tactile cueing from PT to maintain hip position with SL hip abduction and decrease ankle IR. Will continue to progress strengthening as tolerated to improve bilateral lower extremity strength and return pt to ambulation with AD.     Diego Is progressing well towards his goals.   Pt prognosis is Excellent.     Pt will continue to benefit from skilled outpatient physical therapy to address the deficits listed in the problem list box on initial evaluation, provide pt/family education and to maximize pt's level of independence in the home and community environment.     Pt's spiritual, cultural and educational needs considered and pt agreeable to plan of care and goals.     Anticipated barriers to physical therapy: none to note     GOALS:   Short Term Goals (6 Weeks):   1.  Independent with initial HEP.  2.  Pt will perform nu-step at level 2.0 x 10 minutes without rests breaks going at least 10 step/min or greater.  3. Increase strength by 1/3 MMT grade in right quadruceps  to increase tolerance for ADL and work activities.  4.  Patient will improve impaired lower  extremity manual muscle tests to >/= 3+/5 to improve dynamic right hip/knee support for functional tasks     Long Term Goals (12 Weeks):   1.  Independent with updated HEP.  2.  Pt will perform nu-step at level 4.0 x 15 minutes without rests breaks going at least 12 step/min or greater.  3. Pt will be able to perform TUG in < 10 secs without use of AD placingdemonstrating overall improved functional mobility.   4. Pt will performed 30 sec sit to  15 reps without UE support without LOB backward or posterior LE hooking for functional and safe transfers.   5. Patient will improve impaired lower extremity manual muscle tests to >/= 4/5 to improve dynamic right hip/knee support for functional tasks.   6.  Pt will be able to safely perform and tolerate high level ADL's without LOB.   7. Pt will have 0 falls from start of PT sessions.  8. Pt will ambulate on TM x 10 minutes without use of UE support with 0 LOB at 2.2 mph.  9. Pt will perform floor to stand f/b stand to floor transfer with UE support with stand by assist and no cues for improved dynamic transfer, core stability and fall safety in home and community.  10. Pt will begin some form of community fitness to begin regular and consistent performance of exercise to continue maintenance of gains made in PT.    PLAN   Emphasize lower extremity strength and improve gait pattern.     Jaimee Reid, PT, DPT

## 2022-08-11 ENCOUNTER — CLINICAL SUPPORT (OUTPATIENT)
Dept: REHABILITATION | Facility: HOSPITAL | Age: 82
End: 2022-08-11
Attending: NEUROLOGICAL SURGERY
Payer: MEDICARE

## 2022-08-11 ENCOUNTER — CLINICAL SUPPORT (OUTPATIENT)
Dept: REHABILITATION | Facility: HOSPITAL | Age: 82
End: 2022-08-11
Payer: MEDICARE

## 2022-08-11 DIAGNOSIS — Z74.09 IMPAIRED MOBILITY: Primary | ICD-10-CM

## 2022-08-11 DIAGNOSIS — G81.91 RIGHT HEMIPARESIS: ICD-10-CM

## 2022-08-11 DIAGNOSIS — Z98.890 STATUS POST LAMINECTOMY: ICD-10-CM

## 2022-08-11 DIAGNOSIS — R53.1 FUNCTIONAL WEAKNESS: ICD-10-CM

## 2022-08-11 PROCEDURE — 97166 OT EVAL MOD COMPLEX 45 MIN: CPT

## 2022-08-11 PROCEDURE — 97110 THERAPEUTIC EXERCISES: CPT

## 2022-08-11 NOTE — PLAN OF CARE
[facility] Occupational Therapy  Initial Evaluation     Name: Diego TIRADO [facility] Number: [id number]    Therapy Diagnosis:   Encounter Diagnosis   Name Primary?    Status post laminectomy      Physician: Carlos Miller MD    Physician Orders: Z98.890 (ICD-10-CM) - Status post laminectomy   eval and treat   Medical Diagnosis: Z98.890 (ICD-10-CM) - Status post laminectomy     Surgical Procedure/ Date :cervical laminectomy 4/28/22 and spinal cord bleed   Evaluation Date: 8/11/2022  Insurance:[employer]   Insurance Authorization period Expiration: 9/8/22   Plan of Care Certification Period: 10/31/22   -     Visit # / Visits Authortized: 1 / 20   Time In:11:00 am   Time Out: 12:00 pm   Total Billable Time: 60  minutes    Precautions: Diabetes    Subjective     Involved Side:  right  Dominant Side: Right  Date of Onset: 4/28/22   Mechanism of Injury: Diego reports: Pt with pain beginning at 2:30 am on 4/28/2022; decorticate posturing up upper extremity and unable to move bilateral lower extremities. Pt was rushing to the ER; Pt's wife states MD states he had bleeding from his head into his spine. 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   Pt stayed in hospital for 16 days then was transferred to [facility] for rehabilitation for 2 months (OT, PT, speech therapy). Pt was discharged and received OT/PT Home Health for 1 month. Pt biggest complaints involves hand movements, elbow extension, AROM of shoulder and ambulation. Pt only with complaints of pain from shoulder to hand (7/10) - describes as burning pain. Pt wife states pt struggling with short term memory and speech due to dysphagia after surgery.     History of Current Condition: pt has started Outpt  PT and OT he is getting stronger each day .   Imaging: CT scan films     Previous Therapy: had therapy at [facility].     Patient's Goals for Therapy: wants to go back to the gym and wants to  be able to wash his cars each week.     Pain:  Functional Pain Scale Rating 0-10:   4/10 on average  4/10 at best  4/10 at worst  Locationright: small finger , at times it is numb and other times it is tingling     Description: Burning  Aggravating Factors: Bending  Easing Factors: massage    Occupation:   Retired   Working presently: unemployed  Duties: house task     Functional Limitations/Social History:    Previous functional status includes: Independent with all ADLs.     Current FunctionalStatus   Home/Living environment : lives with their spouse      Limitation of Functional Status as follows:   ADLs/IADLs:     - Feeding:Minimal-Moderate Modifications/Assistance    - Bathing:Minimal-Moderate Modifications/Assistance    - Dressing/Grooming: Minimal-Moderate Modifications/Assistance    - Driving: Not Necessary     Leisure: works out at gym daily on eliptical       Past Medical History/Physical Systems Review:   Diego Gunter  has a past medical history of Antiphospholipid syndrome, Dysphagia, colonic polyps, Hyperlipidemia LDL goal <100, Overweight(278.02), Prostate cancer, and Type II or unspecified type diabetes mellitus without mention of complication, not stated as uncontrolled.    Diego Gunter  has a past surgical history that includes Prostatectomy; Cataract extraction, bilateral (2014); Bronchoscopy (N/A, 10/15/2018); and Posterior cervical laminectomy (Bilateral, 4/28/2022).    Diego has a current medication list which includes the following prescription(s): azithromycin, b complex vitamins, ethambutol, nebulizer and compressor, omeprazole, pravastatin, sodium chloride 3%, sodium chloride 3%, [DISCONTINUED] aspirin, and [DISCONTINUED] omega-3 fatty acids.    Review of patient's allergies indicates:  No Known Allergies       Objective     Observation/Appearance:  Skin intact and Skin dry , 10 cm incision to posterior neck , right scapula depressed and winged and atrophy of upper trap and  scapula muscles     Edema. Measured in centimeters. None  Noted     Hand ROM. Measured in degrees.   8/11/2022       Right             Index: MP  wnl                  PIP      wnl                  DIP Amputated yrs ago                  DELEON              Long:  MP wnl                  PIP  wnl                  DIP  wnl                  DELEON              Ring:   MP  wnl                  PIP  wnl                  DIP  wnll                 DELEON              Small:  MP  wnl                   PIP  wnl                    DIP  wnl                  DELEON               Thumb: MP  wnl                    IP  wnl           Rad ADD/ABD  wnl           Pal ADD/ABD  wnl           Opposition   wnl               WRIST         flexion wnl      Ext  wnl      RD wnl      UD wnl      Pro wnl      Sup  wnl               8/11/22 8/11/22      Elbow  AROM  PROM       flexion  140  140       extension -20  Full             Shoulder         flexion  60 120      scaption  60  120      ER  Side body  50  60      IR  50  60      Extension  20  50                      Strength    shoulder  8/11/22      Flexion  2/5      abd  2/5      ER  3/5      IR  2/5      Ext  2/5            Upper trap ( shrugs )  2/5      Mid trap  2/5      Lower trap  1/5                Sensation  Ulnar Nerve  Distribution 8/11/2022 8/11/2022    Left Right   [place] New     Normal 1.65-2.83     Diminished Light Touch 3.22-3.61     Diminished Protective 3.84-4.31  X volar  hand    Loss of Protective 4.56-6.65     Untestable >6.65     2 Point Discrimination     Static     Dynamic        Strength (Dyanmometer) and Pinch Strength (Pinch Gauge)  Measured in pounds and psi. Average of three trials.   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   FOTO documents entered into [employer] - see Media section.                    8/11/2022   Limitation Score: 65 %  Category: Self care              Treatment     Treatment Time In: 11:00 am   Treatment Time Out: 12:00 pm     Total Treatment time separate from Evaluation time:30     Diego received the following manual therapy techniques for 10 minutes:   -supine on mat passive elbow extension with 5-10 seconds holds  X 20 reps , educated wife to be able to assist   To perform A/AROM elbow extension  With 1-2# ankle weight on forearm     Diego received therapeutic exercises for 20  minutes including:  -AAROM supine forward flexion with dowel ( no weight )    AAROM supine scaption with dowel ( no weight )   Standing RED t  Band rows 20 reps   Standing shoulder extension 20 reps       Home Exercise Program/Education:  Issued HEP:  AAROM supine forward flexion with dowel ( no weight )    AAROM supine scaption with dowel ( no weight )   Standing REd t  Band rows 20 reps   Standing shoulder extension 20 reps   perform A/AROM supine elbow extension  With 1-2# ankle weight on forearm , 30 sec holds X 10 reps 1-2 times a day        and educated on modality use for pain management . Exercises were reviewed and Diego was able to demonstrate them prior to the end of the session.   Pt received a written copy of exercises to perform at home. Diego demonstrated good  understanding of the education provided.  Pt was advised to perform these exercises free of pain, and to stop performing them if pain occurs.    Patient/Family Education: role of OT, goals for OT, scheduling/cancellations - pt verbalized understanding. Discussed insurance limitations with patient.    Additional Education provided:  None     Assessment     Diego Gunter is a 81 y.o. male referred to outpatient occupational/hand therapy and presents with a medical diagnosis of right UE weakness due to stroke and cervical  laminectomy on 4/28/22   , resulting in UE weakness shoulder , elbow and hand  and demonstrates limitations as described in the chart below. Following  medical record review it is determined that pt will benefit from occupational therapy services in order to maximize pain free and/or functional use of right UE .     The patient's rehab potential is Good.     Anticipated barriers to occupational therapy: none   Pt has no cultural, educational or language barriers to learning provided.    Profile and History Assessment of Occupational Performance Level of Clinical Decision Making Complexity Score   Occupational Profile:   Diego Gunter is a 81 y.o. male who lives with their family and is currently unemployed . Diego Gunter has difficulty with  Dressing, fine motor to manage clothing   housework/household chores  affecting his/her daily functional abilities. His/her main goal for therapy is to be able to workout daily at the gym and wash his cars .     Comorbidities:    has a past medical history of Antiphospholipid syndrome, Dysphagia, colonic polyps, Hyperlipidemia LDL goal <100, Overweight(278.02), Prostate cancer, and Type II or unspecified type diabetes mellitus without mention of complication, not stated as uncontrolled.    Medical and Therapy History Review:   Brief               Performance Deficits    Physical:  Joint Mobility    Cognitive:  Memory    Psychosocial:    No Deficits     Clinical Decision Making:  low    Assessment Process:  Problem-Focused Assessments    Modification/Need for Assistance:  Not Necessary    Intervention Selection:  Limited Treatment Options       low  Based on PMHX, co morbidities , data from assessments and functional level of assistance required with task and clinical presentation directly impacting function.       The following goals were discussed with the patient and patient is in agreement with them as to be addressed in the treatment plan.      GOALS: 12 weeks. Pt  agrees with goals set.    Short Term (6 weeks on 9/30/22 ) :  1)   Patient to be IND with HEP and modalities for pain management, progressing   2)   Increase ROM elbow extension and shoulder flexion, ex , scaption 10-15 degrees  For  Right UE  motion to increase functional hand use for right hand , progressing   3)   Increase  strength by  10 lbs. to grasp right hand , progressing   4)   Increase pinch 1-3 psis for  5, progressing   5)   Decrease edema .1-2cm to increase joint mobility /flexibility for improved overall functional hand use. , progressing   6)   Increase pinch 1-3 psi's to increase IND wiht button and FM Coordination.    Long Term (by discharge):  1)   Pt will report 0 out of 10 pain with right UE ., progressing   2)   Patient to score of 30% on FOTO to demonstrate improved perception of functional right hand/ arm  Use. Progressing   3)   Pt will return to prior level of function for ADLs and household management, progressing       Plan   Certification Period/Plan of care expiration: 8/11/2022 to  10/31/22 .      Outpatient Occupational Therapy 2 times weekly for 12  weeks may include the following interventions: Therapeutic exercises/activities. and Strengthening.      Smitha Quick, OT

## 2022-08-11 NOTE — PROGRESS NOTES
[facility]   Physical Therapy Treatment Note     Name: Diego TIRADO [facility] Number: [id number]    Therapy Diagnosis:   Encounter Diagnoses   Name Primary?    Impaired mobility Yes    Right hemiparesis      Physician: Carlos Miller MD    Visit Date: 8/11/2022    Physician Orders: PT Eval and Treat   Medical Diagnosis from Referral: Z98.890 (ICD-10-CM) - Status post laminectomy  Evaluation Date: 7/27/2022  Authorization Period Expiration: 08/24/2022  Plan of Care Expiration: 10/24/2022  Progress Note Due: 12th visit   Visit # / Visits authorized: 4 / 11 + eval    PTA Visit #: 0 / 5      Time In: 1000 am   Time Out: 1100 am   Total Appointment Time (timed & untimed codes): 60 minutes (4 TE)     Precautions: Standard; Floyd Catheter since April 28th for urinary retention (removed on 08/05/2022)     SUBJECTIVE     Patient reports: No new complaints; minimal soreness after last visit   He was compliant with home exercise program.  Response to previous treatment: good; appropriate muscle response  Functional change: none to note today    Pain: 0/10, currently  Location: Right side weakness       OBJECTIVE     Objective Measures updated at progress report unless specified.     Treatment     Diego received the treatments listed below:      THERAPEUTIC EXERCISES to develop strength, endurance, ROM and flexibility for 65 minutes including :  - Aerobic activity and endurance training for reciprocal motion of lower limbs on Nustep x 10 minutes at Level 3.0 at > or equal to 50 spm w/o rest to increase mobility, blood flow and improve tissue tolerance.   - SAQ (medium bolster); 3 x 10 reps on each LE with 3 sec hold, 2 lb AW (cone used to prevent ER)   - Supine Clamshells with green TB; 3 x 10 reps; 5 sec hold   - HL Bridging with green TB; 3 x 10 reps, 5 sec hold   - SLR; 3 x 10 reps (rolled towel under knee for quad set)   - LAQ; 3 x 10 reps, 3 sec hold with 2 lb AW   - Bilateral seated  marching with red TB; 2 x 10 reps - MAX CUEING FROM PT to prevent hip ER, knee flexion and ankle inversion  - SUPINE HIP IR (red TB); 2 x 10 reps   - SL hip abduction; 2 x 10 reps (cone used) - pt hand to hold hip in place  Sit to stand w/ red theraband;  bilateral upper extremity assistance from PT (right lower extremity back left lower extremity in front to promote WB through right lower extremity)   Standing marching in walker; 2 x 10 reps; contact guard assist from PT     Diego received the treatments listed below:      GAIT TRAINING to improve functional mobility and safety for 10 minutes.  - Straight cane with max A from PT for correct step pattern   - 350 feet     Patient Education and Home Exercises     Home Exercises Provided and Patient Education Provided     Education provided:   - Will be given at a later visit     Written Home Exercises Provided: Will be given at a later visit . Exercises were reviewed and Diego was able to demonstrate them prior to the end of the session.  Diego demonstrated good  understanding of the education provided. See EMR under Patient Instructions for exercises provided during therapy sessions    ASSESSMENT     Mr. Cortez had good tolerance to exercises performed. Marked difficulty with cog-wheel movements due to poor motor control with SLR and SL hip abduction. Use of cone and tactile cueing from PT to maintain hip position with SL hip abduction and decrease ankle IR. Marked difficulty seen with standing marching due to poor motor control of hip ER. Staggered stance needed with sit to stand to promote proper WB throughout right lower extremity. Theraband used to prevent knee valgus. Max verbal and tactile cueing required for correct form.      Diego Is progressing well towards his goals.   Pt prognosis is Excellent.     Pt will continue to benefit from skilled outpatient physical therapy to address the deficits listed in the problem list box on initial evaluation, provide  pt/family education and to maximize pt's level of independence in the home and community environment.     Pt's spiritual, cultural and educational needs considered and pt agreeable to plan of care and goals.     Anticipated barriers to physical therapy: none to note     GOALS:   Short Term Goals (6 Weeks):   1.  Independent with initial HEP.  2.  Pt will perform nu-step at level 2.0 x 10 minutes without rests breaks going at least 10 step/min or greater.  3. Increase strength by 1/3 MMT grade in right quadruceps  to increase tolerance for ADL and work activities.  4.  Patient will improve impaired lower extremity manual muscle tests to >/= 3+/5 to improve dynamic right hip/knee support for functional tasks     Long Term Goals (12 Weeks):   1.  Independent with updated HEP.  2.  Pt will perform nu-step at level 4.0 x 15 minutes without rests breaks going at least 12 step/min or greater.  3. Pt will be able to perform TUG in < 10 secs without use of AD placingdemonstrating overall improved functional mobility.   4. Pt will performed 30 sec sit to  15 reps without UE support without LOB backward or posterior LE hooking for functional and safe transfers.   5. Patient will improve impaired lower extremity manual muscle tests to >/= 4/5 to improve dynamic right hip/knee support for functional tasks.   6.  Pt will be able to safely perform and tolerate high level ADL's without LOB.   7. Pt will have 0 falls from start of PT sessions.  8. Pt will ambulate on TM x 10 minutes without use of UE support with 0 LOB at 2.2 mph.  9. Pt will perform floor to stand f/b stand to floor transfer with UE support with stand by assist and no cues for improved dynamic transfer, core stability and fall safety in home and community.  10. Pt will begin some form of community fitness to begin regular and consistent performance of exercise to continue maintenance of gains made in PT.    PLAN   Emphasize lower extremity strength and  improve gait pattern.     Jaimee Jeanette, PT, DPT

## 2022-08-11 NOTE — PATIENT INSTRUCTIONS
[facility], OCCUPATIONAL THERAPY  HOME EXERCISE PROGRAM     Complete the following exercises with 15 epetitions each, 2 x/day.     AROM: Elbow Flexion / Extension          Bend and straighten elbow in 3 different positions: thumb up, palm up, palm down.           Therapist: Smitha Quick

## 2022-08-12 ENCOUNTER — TELEPHONE (OUTPATIENT)
Dept: FAMILY MEDICINE | Facility: CLINIC | Age: 82
End: 2022-08-12
Payer: MEDICARE

## 2022-08-12 ENCOUNTER — PATIENT MESSAGE (OUTPATIENT)
Dept: FAMILY MEDICINE | Facility: CLINIC | Age: 82
End: 2022-08-12
Payer: MEDICARE

## 2022-08-12 NOTE — TELEPHONE ENCOUNTER
----- Message from Corey Pitt sent at 8/12/2022 12:22 PM CDT -----  .Type: Patient Call Back    Who called: wife geraldine    What is the request in detail: requesting orders for blood work before their appt on 8/18. geraldine castellon would like orders as well    Can the clinic reply by [facility]?no    Would the patient rather a call back or a response via [facility]? call    Best call back number:.[phone] (home)

## 2022-08-12 NOTE — TELEPHONE ENCOUNTER
Patient is asking for lab orders to be placed for completion. He has an appt to see Raina Bedoya on 8/18. Please advise!

## 2022-08-15 ENCOUNTER — CLINICAL SUPPORT (OUTPATIENT)
Dept: REHABILITATION | Facility: HOSPITAL | Age: 82
End: 2022-08-15
Payer: MEDICARE

## 2022-08-15 DIAGNOSIS — R53.1 FUNCTIONAL WEAKNESS: Primary | ICD-10-CM

## 2022-08-15 DIAGNOSIS — R29.898 UPPER EXTREMITY WEAKNESS: Primary | ICD-10-CM

## 2022-08-15 DIAGNOSIS — E78.5 HYPERLIPIDEMIA LDL GOAL <100: ICD-10-CM

## 2022-08-15 PROCEDURE — 97110 THERAPEUTIC EXERCISES: CPT

## 2022-08-15 RX ORDER — PRAVASTATIN SODIUM 10 MG/1
10 TABLET ORAL DAILY
Qty: 90 TABLET | Refills: 0 | Status: SHIPPED | OUTPATIENT
Start: 2022-08-15 | End: 2022-12-05

## 2022-08-15 RX ORDER — PRAVASTATIN SODIUM 10 MG/1
10 TABLET ORAL DAILY
Qty: 90 TABLET | Refills: 0 | OUTPATIENT
Start: 2022-08-15

## 2022-08-15 NOTE — TELEPHONE ENCOUNTER
No new care gaps identified.  [facility] Embedded Care Gaps. Reference number: [id number]. 8/15/2022   10:13:52 AM CDT

## 2022-08-15 NOTE — PROGRESS NOTES
[facility]   Physical Therapy Treatment Note     Name: Diego TIRADO [facility] Number: [id number]    Therapy Diagnosis:   Encounter Diagnosis   Name Primary?    Functional weakness Yes     Physician: Carlos Miller MD    Visit Date: 8/15/2022    Physician Orders: PT Eval and Treat   Medical Diagnosis from Referral: Z98.890 (ICD-10-CM) - Status post laminectomy  Evaluation Date: 7/27/2022  Authorization Period Expiration: 08/24/2022  Plan of Care Expiration: 10/24/2022  Progress Note Due: 12th visit   Visit # / Visits authorized: 5 / 11 + eval    PTA Visit #: 0 / 5      Time In: 1000 am   Time Out: 1100 am   Total Appointment Time (timed & untimed codes): 60 minutes (4 TE)     Precautions: Standard; Floyd Catheter since April 28th for urinary retention (removed on 08/05/2022)     SUBJECTIVE     Patient reports: Pt states he fell over the weekend in his garage. Pt with marks on anterior left knee and left arm. Pt states he did not report falls to his doctor, not experiencing any additional pain from his fall.   He was compliant with home exercise program.  Response to previous treatment: good; appropriate muscle response  Functional change: none to note today    Pain: 0/10, currently  Location: Right side weakness       OBJECTIVE     Objective Measures updated at progress report unless specified.     Treatment     Diego received the treatments listed below:      THERAPEUTIC EXERCISES to develop strength, endurance, ROM and flexibility for 65 minutes including :  - Aerobic activity and endurance training for reciprocal motion of lower limbs on Nustep x 10 minutes at Level 4.0 at > or equal to 50 spm w/o rest to increase mobility, blood flow and improve tissue tolerance.   - SAQ (medium bolster); 3 x 10 reps on each LE with 3 sec hold, 3 lb AW (cone used to prevent ER)   - Supine Clamshells with green TB; 3 x 10 reps; 5 sec hold   - HL Bridging with green TB; 3 x 10 reps, 5 sec hold   -  SLR; 3 x 10 reps (rolled towel under knee for quad set)   - LAQ; 3 x 10 reps, 3 sec hold with 2 lb AW        - SL hip abduction; 2 x 10 reps (cone used) - pt hand to hold hip in place    - Sit to stand w/ yellow theraband;  bilateral upper extremity assistance from PT (right lower extremity back left lower extremity in front to promote WB through right lower extremity) - 3 x 10 reps   - Standing marching in walker; 2 x 10 reps; contact guard assist from PT     Diego received the treatments listed below:      GAIT TRAINING to improve functional mobility and safety for 10 minutes.  - Straight cane with max A from PT for correct step pattern   - 350 feet     Patient Education and Home Exercises     Home Exercises Provided and Patient Education Provided     Education provided:   - Will be given at a later visit     Written Home Exercises Provided: Will be given at a later visit . Exercises were reviewed and Diego was able to demonstrate them prior to the end of the session.  Diego demonstrated good  understanding of the education provided. See EMR under Patient Instructions for exercises provided during therapy sessions    ASSESSMENT     Mr. Cortez had good tolerance to exercises performed. Marked difficulty with cog-wheel movements due to poor motor control with SLR and SL hip abduction. Use of cone and tactile cueing from PT to maintain hip position with SL hip abduction and decrease ankle IR. Marked difficulty seen with standing marching due to poor motor control of hip ER. Staggered stance needed with sit to stand to promote proper WB throughout right lower extremity. Theraband used to prevent knee valgus. Max verbal and tactile cueing required for correct form.      Diego Is progressing well towards his goals.   Pt prognosis is Excellent.     Pt will continue to benefit from skilled outpatient physical therapy to address the deficits listed in the problem list box on initial evaluation, provide pt/family  education and to maximize pt's level of independence in the home and community environment.     Pt's spiritual, cultural and educational needs considered and pt agreeable to plan of care and goals.     Anticipated barriers to physical therapy: none to note     GOALS:   Short Term Goals (6 Weeks):   1.  Independent with initial HEP.  2.  Pt will perform nu-step at level 2.0 x 10 minutes without rests breaks going at least 10 step/min or greater.  3. Increase strength by 1/3 MMT grade in right quadruceps  to increase tolerance for ADL and work activities.  4.  Patient will improve impaired lower extremity manual muscle tests to >/= 3+/5 to improve dynamic right hip/knee support for functional tasks     Long Term Goals (12 Weeks):   1.  Independent with updated HEP.  2.  Pt will perform nu-step at level 4.0 x 15 minutes without rests breaks going at least 12 step/min or greater.  3. Pt will be able to perform TUG in < 10 secs without use of AD placingdemonstrating overall improved functional mobility.   4. Pt will performed 30 sec sit to  15 reps without UE support without LOB backward or posterior LE hooking for functional and safe transfers.   5. Patient will improve impaired lower extremity manual muscle tests to >/= 4/5 to improve dynamic right hip/knee support for functional tasks.   6.  Pt will be able to safely perform and tolerate high level ADL's without LOB.   7. Pt will have 0 falls from start of PT sessions.  8. Pt will ambulate on TM x 10 minutes without use of UE support with 0 LOB at 2.2 mph.  9. Pt will perform floor to stand f/b stand to floor transfer with UE support with stand by assist and no cues for improved dynamic transfer, core stability and fall safety in home and community.  10. Pt will begin some form of community fitness to begin regular and consistent performance of exercise to continue maintenance of gains made in PT.    PLAN   Emphasize lower extremity strength and improve gait  pattern.     Jaimee Jeanette, PT, DPT

## 2022-08-15 NOTE — TELEPHONE ENCOUNTER
No new care gaps identified.  [facility] Embedded Care Gaps. Reference number: [id number]. 8/15/2022   10:20:21 AM CDT

## 2022-08-15 NOTE — PROGRESS NOTES
Occupational Therapy Daily Treatment Note   Date 8/15/2022    Name: Diego TIRADO [facility] [place] Number: [id number]    Therapy Diagnosis:  Right UE weakness , sp laminectomy and stroke   Encounter Diagnosis   Name Primary?    Upper extremity weakness Yes     Physician: Carlos Miller MD    Physician: Carlos Miller MD     Physician Orders: Z98.890 (ICD-10-CM) - Status post laminectomy   eval and treat   Medical Diagnosis: Z98.890 (ICD-10-CM) - Status post laminectomy      Surgical Procedure/ Date :cervical laminectomy 4/28/22 and spinal cord bleed   Mechanism of Injury: Diego reports: Pt with pain beginning at 2:30 am on 4/28/2022; decorticate posturing up upper extremity and unable to move bilateral lower extremities. Pt was rushing to the ER; Pt's wife states MD states he had bleeding from his head into his spine. 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   Pt stayed in hospital for 16 days then was transferred to [facility] for rehabilitation for 2 months (OT, PT, speech therapy). Pt was discharged and received OT/PT Home Health for 1 month. Pt biggest complaints involves hand movements, elbow extension, AROM of shoulder and ambulation. Pt only with complaints of pain from shoulder to hand (7/10) - describes as burning pain. Pt wife states pt struggling with short term memory and speech due to dysphagia after surgery.   Evaluation Date: 8/11/2022  Insurance:[facility] health   Insurance Authorization period Expiration: 9/8/22   Plan of Care Certification Period: 10/31/22   -      Visit # / Visits Authortized: 1 / 20   Time In:11:00 am   Time Out: 12:00 pm   Total Billable Time: 60  minutes     Precautions: Diabetes    Subjective   Wife reports that he fell over the weekend with scrapes on left arm and left knee.     Pt reports: he was compliant with home exercise program given last session.   Response to previous treatment:increased use of right hand    Functional change: none noted yet     Pain: 5/10 in hand , numb and burns at time s  Location: right UE     Objective     Diego received the following supervised modalities after being cleared for contradictions for 10  minutes:   -moist heat right shoulder and right elbow       Diego received  Manual  Therapy   for 10  minutes including: in supine   - STM right hand   and PROM elbow flexion/ extension X 10 reps   And shoulder flexion, scaption ER and IR      There ex X 20 minutes   Hand gripper 2 YRB X 2 minutes   pom pom manipulation 2 sets   Elbow flexion 2# flex/ ext   Shoulder shrugs 30 reps   Yellow t band shoulder rows and  And shoulder extension 30 reps        Home Exercises and Education Provided     Education provided:   - continue with HEP   - Progress towards goals     Written Home Exercises Provided: Patient instructed to cont prior HEP.    Exercises were reviewed and Diego was able to demonstrate them prior to the end of the session.  Diego demonstrated good  understanding of the education provided.   .   See EMR under Patient Instructions for exercises provided prior visit.       Assessment     Pt would continue to benefit from skilled OT.  Yes      Diego is progressing well towards his goals and there are no updates to goals at this time. Pt prognosis is Good.     Pt will continue to benefit from skilled outpatient occupational therapy to address the deficits listed in the problem list on initial evaluation provide pt/family education and to maximize pt's level of independence in the home and community environment.     Anticipated barriers to occupational therapy:  None     Pt's spiritual, cultural and educational needs considered and pt agreeable to plan of care and goals.      Goals:  GOALS: 12 weeks. Pt agrees with goals set.     Short Term (6 weeks on 9/30/22 ) :  1)   Patient to be IND with HEP and modalities for pain management, progressing   2)   Increase ROM elbow extension and  shoulder flexion, ex , scaption 10-15 degrees  For  Right UE  motion to increase functional hand use for right hand , progressing   3)   Increase  strength by  10 lbs. to grasp right hand , progressing   4)   Increase pinch 1-3 psis for  5, progressing   5)   Decrease edema .1-2cm to increase joint mobility /flexibility for improved overall functional hand use. , progressing   6)   Increase pinch 1-3 psi's to increase IND with button and FM Coordination.     Long Term (by discharge):  1)   Pt will report 0 out of 10 pain with right UE ., progressing   2)   Patient to score of 30% on FOTO to demonstrate improved perception of functional right hand/ arm  Use. Progressing   3)   Pt will return to prior level of function for ADLs and household management, progressing         Plan   Certification Period/Plan of care expiration: 8/11/2022 to  10/31/22 .     Discussed Plan of Care with patient: Yes  Updates/Grading for next session:  Yes       Smitha Quick, OT

## 2022-08-15 NOTE — TELEPHONE ENCOUNTER
----- Message from Ansley Tang sent at 8/15/2022  9:50 AM CDT -----  Type: RX Refill Request    Who Called: Wife/Addis    Have you contacted your pharmacy: yes    Refill or New Rx: refill    RX Name and Strength:pravastatin (PRAVACHOL) 10 MG tablet    Preferred Pharmacy with phone number:  [facility] - [place] - [address]   Phone:  [phone]  Fax:  [phone]        Local or Mail Order:local    Ordering Provider:Dr Ferreira    Would the patient rather a call back or a response via [facility] [employer]? Call back    Best Call Back Number: [phone] (home)

## 2022-08-17 ENCOUNTER — CLINICAL SUPPORT (OUTPATIENT)
Dept: REHABILITATION | Facility: HOSPITAL | Age: 82
End: 2022-08-17
Payer: MEDICARE

## 2022-08-17 DIAGNOSIS — R29.898 UPPER EXTREMITY WEAKNESS: Primary | ICD-10-CM

## 2022-08-17 DIAGNOSIS — R53.1 FUNCTIONAL WEAKNESS: Primary | ICD-10-CM

## 2022-08-17 PROCEDURE — 97110 THERAPEUTIC EXERCISES: CPT | Mod: CO

## 2022-08-17 PROCEDURE — 97140 MANUAL THERAPY 1/> REGIONS: CPT | Mod: CO

## 2022-08-17 PROCEDURE — 97110 THERAPEUTIC EXERCISES: CPT

## 2022-08-17 NOTE — PROGRESS NOTES
"                            Occupational Therapy Daily Treatment Note   Date 8/17/2022    Name: Diego TIRADO [facility]  Clinic Number: [id number]    Therapy Diagnosis:  Right UE weakness , sp laminectomy and stroke   Encounter Diagnosis   Name Primary?    Upper extremity weakness Yes     Physician: Carlos Miller MD    Physician: Carlos Miller MD     Physician Orders: Z98.890 (ICD-10-CM) - Status post laminectomy   eval and treat   Medical Diagnosis: Z98.890 (ICD-10-CM) - Status post laminectomy      Surgical Procedure/ Date :cervical laminectomy 4/28/22 and spinal cord bleed   Mechanism of Injury: Diego reports: Pt with pain beginning at 2:30 am on 4/28/2022; decorticate posturing up upper extremity and unable to move bilateral lower extremities. Pt was rushing to the ER; Pt's wife states MD states he had bleeding from his head into his spine. 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   Pt stayed in hospital for 16 days then was transferred to [facility] for rehabilitation for 2 months (OT, PT, speech therapy). Pt was discharged and received OT/PT Home Health for 1 month. Pt biggest complaints involves hand movements, elbow extension, AROM of shoulder and ambulation. Pt only with complaints of pain from shoulder to hand (7/10) - describes as burning pain. Pt wife states pt struggling with short term memory and speech due to dysphagia after surgery.   Evaluation Date: 8/11/2022  Insurance:[facility] health   Insurance Authorization period Expiration: 9/8/22   Plan of Care Certification Period: 10/31/22   -      Visit # / Visits Authortized: 2 / 11  Time In:9:58 am   Time Out: 10:48 am   Total Billable Time: 50  minutes     Precautions: Diabetes    Subjective       Pt reports: "I'm doing good."   he was compliant with home exercise program given last session.  Response to previous treatment:increased use of right hand   Functional change: none noted yet     Pain: 5/10 in hand , numb and " burns at time s  Location: right UE     Objective     Diego received the following supervised modalities after being cleared for contradictions for 10  minutes:   -moist heat right shoulder and right elbow       Diego received  Manual  Therapy   for 10  minutes including: in supine   - STM right hand (NT)  -STM UT   and PROM elbow flexion/ extension X 10 reps   And shoulder flexion, scaption, abd,  ER and IR      There ex X 30 minutes   Dowel flexion/scaption no weight x10 ea  ER/IR supine x10  tricep ext supine x10  Hand gripper 2 YRB X 2 minutes   pom pom manipulation 2 sets   Elbow flexion 2# flex/ ext   Shoulder shrugs 30 reps   Yellow t band shoulder rows and  And shoulder extension 30 reps  (NT)  Towel slides in flexion x20  Weightbearing pushes with ball at side x20 (3 sec holds)        Home Exercises and Education Provided     Education provided:   - continue with HEP   - Progress towards goals     Written Home Exercises Provided: Patient instructed to cont prior HEP.    Exercises were reviewed and Diego was able to demonstrate them prior to the end of the session.  Diego demonstrated good  understanding of the education provided.   .   See EMR under Patient Instructions for exercises provided prior visit.       Assessment     Pt would continue to benefit from skilled OT.  Yes    He tolerated session well today. C/o pain with with shoulder flexion in supine, however reported it was tolerable. No pain with elbow flexion however observed a clicking and rolling of tricep tendon in proximal medial elbow during PROM, not present with therapist stabilizing tricep muscle. Unable to perform scapular retraction 2* cognitive deficits. He participated well. Will progress as tolerated.    Diego is progressing well towards his goals and there are no updates to goals at this time. Pt prognosis is Good.     Pt will continue to benefit from skilled outpatient occupational therapy to address the deficits listed in the  problem list on initial evaluation provide pt/family education and to maximize pt's level of independence in the home and community environment.     Anticipated barriers to occupational therapy:  None     Pt's spiritual, cultural and educational needs considered and pt agreeable to plan of care and goals.      Goals:  GOALS: 12 weeks. Pt agrees with goals set.     Short Term (6 weeks on 9/30/22 ) :  1)   Patient to be IND with HEP and modalities for pain management, progressing   2)   Increase ROM elbow extension and shoulder flexion, ex , scaption 10-15 degrees  For  Right UE  motion to increase functional hand use for right hand , progressing   3)   Increase  strength by  10 lbs. to grasp right hand , progressing   4)   Increase pinch 1-3 psis for  5, progressing   5)   Decrease edema .1-2cm to increase joint mobility /flexibility for improved overall functional hand use. , progressing   6)   Increase pinch 1-3 psi's to increase IND with button and FM Coordination.     Long Term (by discharge):  1)   Pt will report 0 out of 10 pain with right UE ., progressing   2)   Patient to score of 30% on FOTO to demonstrate improved perception of functional right hand/ arm  Use. Progressing   3)   Pt will return to prior level of function for ADLs and household management, progressing         Plan   Certification Period/Plan of care expiration: 8/11/2022 to  10/31/22 .     Discussed Plan of Care with patient: Yes  Updates/Grading for next session:  Yes       LV Torres     Client Care conference completed with evaluating therapist in regards to this patients POC as evidenced by co signature of supervising therapist.

## 2022-08-17 NOTE — PROGRESS NOTES
[facility]   Physical Therapy Treatment Note     Name: Diego TIRADO [facility] Number: [id number]    Therapy Diagnosis:   Encounter Diagnosis   Name Primary?    Functional weakness Yes     Physician: Carlos Miller MD    Visit Date: 8/17/2022    Physician Orders: PT Eval and Treat   Medical Diagnosis from Referral: Z98.890 (ICD-10-CM) - Status post laminectomy  Evaluation Date: 7/27/2022  Authorization Period Expiration: 08/24/2022  Plan of Care Expiration: 10/24/2022  Progress Note Due: 12th visit   Visit # / Visits authorized: 6 / 11 + eval    PTA Visit #: 0 / 5      Time In: 0900 am   Time Out: 1000 am   Total Appointment Time (timed & untimed codes): 60 minutes (4 TE)     Precautions: Standard; Floyd Catheter since April 28th for urinary retention (removed on 08/05/2022)     SUBJECTIVE     Patient reports: moderate soreness after last visit; no new complaints   He was compliant with home exercise program.  Response to previous treatment: good; appropriate muscle response  Functional change: none to note today    Pain: 0/10, currently  Location: Right side weakness       OBJECTIVE     Objective Measures updated at progress report unless specified.     Treatment     Diego received the treatments listed below:      THERAPEUTIC EXERCISES to develop strength, endurance, ROM and flexibility for 65 minutes including :  - Aerobic activity and endurance training for reciprocal motion of lower limbs on Nustep x 10 minutes at Level 4.0 at > or equal to 50 spm w/o rest to increase mobility, blood flow and improve tissue tolerance.   - SAQ (medium bolster); 3 x 10 reps on each LE with 5 sec hold, 3 lb AW (cone used to prevent ER)   - Supine Clamshells with green TB; 3 x 10 reps; 5 sec hold   - HL Bridging with green TB; 3 x 10 reps, 5 sec hold   - SLR; 3 x 10 reps (rolled towel under knee for quad set)   - LAQ; 3 x 10 reps, 5 sec hold with 3 lb AW        - SL hip abduction; 2 x 10 reps (cone  used) - pt hand to hold hip in place    - Sit to stand w/ yellow theraband;  bilateral upper extremity assistance from PT (right lower extremity back left lower extremity in front to promote WB through right lower extremity) - 3 x 10 reps   - Standing marching in walker; 2 x 10 reps; contact guard assist from PT     Diego received the treatments listed below:      GAIT TRAINING to improve functional mobility and safety for 10 minutes.  - Straight cane with max A from PT for correct step pattern   - 350 feet     Patient Education and Home Exercises     Home Exercises Provided and Patient Education Provided     Education provided:   - Will be given at a later visit     Written Home Exercises Provided: Will be given at a later visit . Exercises were reviewed and Diego was able to demonstrate them prior to the end of the session.  Diego demonstrated good  understanding of the education provided. See EMR under Patient Instructions for exercises provided during therapy sessions    ASSESSMENT     Mr. Cortez had good tolerance to exercises performed. Marked difficulty with cog-wheel movements due to poor motor control with SLR and SL hip abduction. Use of cone and tactile cueing from PT to maintain hip position with SL hip abduction and decrease ankle IR. Marked difficulty seen with standing marching due to poor motor control of hip ER. Increased difficulty with sit to stands today from standard chair with 1 airex. Still requiring staggered stance with sit to stand to ensure promote proper WB throughout right lower extremity. Green theraband used to prevent knee valgus. Pt still requiring use of rolling walker due to inability to correctly ambulate with standard cane without vax verbal and tactile cueing from PT.      Diego Is progressing well towards his goals.   Pt prognosis is Excellent.     Pt will continue to benefit from skilled outpatient physical therapy to address the deficits listed in the problem list box on  initial evaluation, provide pt/family education and to maximize pt's level of independence in the home and community environment.     Pt's spiritual, cultural and educational needs considered and pt agreeable to plan of care and goals.     Anticipated barriers to physical therapy: none to note     GOALS:   Short Term Goals (6 Weeks):   1.  Independent with initial HEP.  2.  Pt will perform nu-step at level 2.0 x 10 minutes without rests breaks going at least 10 step/min or greater.  3. Increase strength by 1/3 MMT grade in right quadruceps  to increase tolerance for ADL and work activities.  4.  Patient will improve impaired lower extremity manual muscle tests to >/= 3+/5 to improve dynamic right hip/knee support for functional tasks     Long Term Goals (12 Weeks):   1.  Independent with updated HEP.  2.  Pt will perform nu-step at level 4.0 x 15 minutes without rests breaks going at least 12 step/min or greater.  3. Pt will be able to perform TUG in < 10 secs without use of AD placingdemonstrating overall improved functional mobility.   4. Pt will performed 30 sec sit to  15 reps without UE support without LOB backward or posterior LE hooking for functional and safe transfers.   5. Patient will improve impaired lower extremity manual muscle tests to >/= 4/5 to improve dynamic right hip/knee support for functional tasks.   6.  Pt will be able to safely perform and tolerate high level ADL's without LOB.   7. Pt will have 0 falls from start of PT sessions.  8. Pt will ambulate on TM x 10 minutes without use of UE support with 0 LOB at 2.2 mph.  9. Pt will perform floor to stand f/b stand to floor transfer with UE support with stand by assist and no cues for improved dynamic transfer, core stability and fall safety in home and community.  10. Pt will begin some form of community fitness to begin regular and consistent performance of exercise to continue maintenance of gains made in PT.    PLAN   Emphasize lower  extremity strength and improve gait pattern.     Jaimee Jeanette, PT, DPT

## 2022-08-18 ENCOUNTER — OFFICE VISIT (OUTPATIENT)
Dept: FAMILY MEDICINE | Facility: CLINIC | Age: 82
End: 2022-08-18
Payer: MEDICARE

## 2022-08-18 VITALS
DIASTOLIC BLOOD PRESSURE: 70 MMHG | HEART RATE: 60 BPM | WEIGHT: 164.38 LBS | SYSTOLIC BLOOD PRESSURE: 118 MMHG | BODY MASS INDEX: 24.27 KG/M2 | TEMPERATURE: 98 F | OXYGEN SATURATION: 95 %

## 2022-08-18 DIAGNOSIS — I95.9 HYPOTENSION, UNSPECIFIED HYPOTENSION TYPE: ICD-10-CM

## 2022-08-18 DIAGNOSIS — E78.5 HYPERLIPIDEMIA, UNSPECIFIED HYPERLIPIDEMIA TYPE: ICD-10-CM
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
## 2022-08-18 DIAGNOSIS — Z74.09 IMPAIRED MOBILITY: ICD-10-CM

## 2022-08-18 DIAGNOSIS — R73.03 PREDIABETES: ICD-10-CM

## 2022-08-18 DIAGNOSIS — I70.0 ATHEROSCLEROSIS OF AORTA: ICD-10-CM

## 2022-08-18 PROCEDURE — 3074F SYST BP LT 130 MM HG: CPT | Mod: CPTII,S$GLB,, | Performed by: NURSE PRACTITIONER

## 2022-08-18 PROCEDURE — 1125F AMNT PAIN NOTED PAIN PRSNT: CPT | Mod: CPTII,S$GLB,, | Performed by: NURSE PRACTITIONER

## 2022-08-18 PROCEDURE — 1157F PR ADVANCE CARE PLAN OR EQUIV PRESENT IN MEDICAL RECORD: ICD-10-PCS | Mod: CPTII,S$GLB,, | Performed by: NURSE PRACTITIONER

## 2022-08-18 PROCEDURE — 1100F PR PT FALLS ASSESS DOC 2+ FALLS/FALL W/INJURY/YR: ICD-10-PCS | Mod: CPTII,S$GLB,, | Performed by: NURSE PRACTITIONER

## 2022-08-18 PROCEDURE — 3288F PR FALLS RISK ASSESSMENT DOCUMENTED: ICD-10-PCS | Mod: CPTII,S$GLB,, | Performed by: NURSE PRACTITIONER

## 2022-08-18 PROCEDURE — 1159F PR MEDICATION LIST DOCUMENTED IN MEDICAL RECORD: ICD-10-PCS | Mod: CPTII,S$GLB,, | Performed by: NURSE PRACTITIONER

## 2022-08-18 PROCEDURE — 1125F PR PAIN SEVERITY QUANTIFIED, PAIN PRESENT: ICD-10-PCS | Mod: CPTII,S$GLB,, | Performed by: NURSE PRACTITIONER

## 2022-08-18 PROCEDURE — 99214 PR OFFICE/OUTPT VISIT, EST, LEVL IV, 30-39 MIN: ICD-10-PCS | Mod: S$GLB,,, | Performed by: NURSE PRACTITIONER

## 2022-08-18 PROCEDURE — 99999 PR PBB SHADOW E&M-EST. PATIENT-LVL IV: CPT | Mod: PBBFAC,,, | Performed by: NURSE PRACTITIONER

## 2022-08-18 PROCEDURE — 3078F PR MOST RECENT DIASTOLIC BLOOD PRESSURE < 80 MM HG: ICD-10-PCS | Mod: CPTII,S$GLB,, | Performed by: NURSE PRACTITIONER

## 2022-08-18 PROCEDURE — 1100F PTFALLS ASSESS-DOCD GE2>/YR: CPT | Mod: CPTII,S$GLB,, | Performed by: NURSE PRACTITIONER

## 2022-08-18 PROCEDURE — 3074F PR MOST RECENT SYSTOLIC BLOOD PRESSURE < 130 MM HG: ICD-10-PCS | Mod: CPTII,S$GLB,, | Performed by: NURSE PRACTITIONER

## 2022-08-18 PROCEDURE — 99999 PR PBB SHADOW E&M-EST. PATIENT-LVL IV: ICD-10-PCS | Mod: PBBFAC,,, | Performed by: NURSE PRACTITIONER

## 2022-08-18 PROCEDURE — 3078F DIAST BP <80 MM HG: CPT | Mod: CPTII,S$GLB,, | Performed by: NURSE PRACTITIONER

## 2022-08-18 PROCEDURE — 1159F MED LIST DOCD IN RCRD: CPT | Mod: CPTII,S$GLB,, | Performed by: NURSE PRACTITIONER

## 2022-08-18 PROCEDURE — 1157F ADVNC CARE PLAN IN RCRD: CPT | Mod: CPTII,S$GLB,, | Performed by: NURSE PRACTITIONER

## 2022-08-18 PROCEDURE — 3288F FALL RISK ASSESSMENT DOCD: CPT | Mod: CPTII,S$GLB,, | Performed by: NURSE PRACTITIONER

## 2022-08-18 PROCEDURE — 99214 OFFICE O/P EST MOD 30 MIN: CPT | Mod: S$GLB,,, | Performed by: NURSE PRACTITIONER

## 2022-08-18 NOTE — PROGRESS NOTES
"  HPI     Chief Complaint:  Chief Complaint   Patient presents with    Follow-up       Diego Gunter is a 81 y.o. male with multiple medical diagnoses as listed in the medical history and problem list that presents for follow up and fall.  Pt is known to me with his his last appointment 6/14/2022.      Pt's spouse present.    Fall  Incident onset: 8/6/22. The fall occurred while walking. He landed on concrete. Point of impact: left forearm and left leg. The patient is experiencing no pain. Pertinent negatives include no abdominal pain, bowel incontinence, fever, headaches, hearing loss, hematuria, loss of consciousness, nausea, numbness, tingling, visual change or vomiting. He has tried rest for the symptoms.     Pt was wearing flip flops and was not using his walker. Denies hitting his head or LOC. "I forgot to use my walker. I was kicking off my flip flops before entering the house, I lost my balance and fell on the concrete landing on my left arm and left leg. I don't have any pain. I have some scrapes and have been putting neosporin on it." denies fever of s/s of infection from wounds.     Pt already possesses shower chair, walker, and wheelchair. He is also participating actively in PT and OT.      he is compliant with medications daily without any adverse side effects.    Assessment & Plan     Problem List Items Addressed This Visit        Cardiac/Vascular    Atherosclerosis of aorta    -assessed and addressed all modifiable risk factors.  Continue with appropriate management to prevent complications with regards to lipid and BP monitoring.      Overview     - noted on CT chest 9/2018           Hyperlipidemia    discussed ways to lower triglycerides such as cutting simple sugars out of diet (white breads, candies, cookies, cakes, etc.) and reducing/eliminating intake of highly processed trans fatty acids.   Exercise 30 minutes a day for 4-5 days a week.   Eat more fiber.        Hypotension    /70 " (BP Location: Left arm)   Pulse 60   Temp 97.7 °F (36.5 °C) (Oral)   Wt 74.6 kg (164 lb 5.7 oz)   SpO2 95%   BMI 24.27 kg/m²   -continue current medication regimen  -DASH diet, regular cardiovascular exercises, portion control  - ?weight loss  -f/u with BP logs in 2 weeks if BP is not consistently <140/90           Endocrine    Prediabetes    Patient does have a Hx of prediabtes, which we discussed increased risk for diabetes in the future, therefore, I recommend dietary modification such as lowering carbohydrates in diet (pasta, rice, bread, potatoes, crackers, cookies, candy, soda, etc.) to decrease the risk of progression to diabetes.            Overview     - hx of diet controlled type 2 diabetes - resolved 3/2022 due to A1c < 6.5 for at least 5 years without medication  - not on ace inhibitor  (has intermittent high potassium level)                  Other    Impaired mobility    As below      Other Visit Diagnoses     Fall from standing, initial encounter    -  Primary      Abrasions to left forearm and left lower leg see media.     -education provided on fall prevention and fall risk strategies. Discussed removing hazards, improving lighting, removing safety devices.   -Handouts provided.     What to do if you fall  Above all, try to stay calm:  · If you start to fall, try to relax your body. This will reduce the impact of the fall.  · After you fall, press your monitor button, or phone for help.  · Don't rush to get up. First, make sure you're not hurt.  · Roll onto your side, then crawl to a chair. Pull yourself up onto the chair slowly.  · You should call 911 if you struck your head, lost consciousness, were confused afterward, or have any other concerns for injury.  Be sure to tell your doctor that you fell.    Notify provider if you experience the following symptoms.   · Feeling lightheaded or dizzy more than once a day  · Losing your balance often or feeling unsteady on your feet  · Feeling numbness  in your legs or feet, or noticing a change in the way you walk  · Having a steady decline in your memory or mental sharpness    Continue to utilize assistive devices and participate in PT/OT          --------------------------------------------      Health Maintenance:  Health Maintenance       Date Due Completion Date    COVID-19 Vaccine (4 - Booster for Pfizer series) 03/05/2022 11/5/2021    Influenza Vaccine (1) 09/01/2022 11/5/2021    Override on 10/19/2020: Done    Lipid Panel 04/28/2023 4/28/2022    Hemoglobin A1c 04/28/2023 4/28/2022    TETANUS VACCINE 03/08/2025 3/8/2015          Health maintenance reviewed.  Advised COVID19 booster.     Follow Up:  Follow up in about 3 months (around 11/18/2022), or if symptoms worsen or fail to improve.    Exam     Review of Systems:  (as noted above)  Review of Systems   Constitutional: Negative for chills, fever and unexpected weight change.   HENT: Negative for sore throat and trouble swallowing.    Eyes: Negative for visual disturbance.   Respiratory: Negative for cough, chest tightness, shortness of breath and wheezing.    Cardiovascular: Negative for chest pain and palpitations.   Gastrointestinal: Negative for abdominal pain, anal bleeding, blood in stool, bowel incontinence, nausea and vomiting.   Endocrine: Negative for polydipsia and polyphagia.   Genitourinary: Negative for decreased urine volume and hematuria.   Musculoskeletal: Positive for gait problem (walker).   Skin: Positive for wound. Negative for rash.   Neurological: Positive for weakness. Negative for tingling, seizures, loss of consciousness, speech difficulty, numbness and headaches.   Psychiatric/Behavioral: Negative for confusion, decreased concentration and suicidal ideas.       Physical Exam:   Physical Exam  Constitutional:       General: He is not in acute distress.     Appearance: He is not ill-appearing or diaphoretic.   HENT:      Head: Normocephalic and atraumatic.   Eyes:      General: No  scleral icterus.     Pupils: Pupils are equal, round, and reactive to light.   Neck:      Vascular: No carotid bruit.   Cardiovascular:      Rate and Rhythm: Normal rate and regular rhythm.      Pulses: Normal pulses.      Heart sounds: No murmur heard.    No friction rub. No gallop.   Pulmonary:      Effort: No respiratory distress.      Breath sounds: No wheezing.   Chest:      Chest wall: No tenderness.   Musculoskeletal:         General: Signs of injury present.      Cervical back: No rigidity or tenderness.   Lymphadenopathy:      Cervical: No cervical adenopathy.   Skin:     Capillary Refill: Capillary refill takes 2 to 3 seconds.      Findings: No rash.   Neurological:      Mental Status: He is alert.      Cranial Nerves: No cranial nerve deficit.      Sensory: No sensory deficit.      Motor: Weakness (right arm, bilateral legs, (improving). ) present.      Gait: Gait abnormal (walker).       Vitals:    08/18/22 0906   BP: 118/70   BP Location: Left arm   Pulse: 60   Temp: 97.7 °F (36.5 °C)   TempSrc: Oral   SpO2: 95%   Weight: 74.6 kg (164 lb 5.7 oz)      Body mass index is 24.27 kg/m².            History     Past Medical History:  Past Medical History:   Diagnosis Date    Antiphospholipid syndrome 11/19/2021    Dysphagia     Hx of colonic polyps     followed by GI. Dr. Pham    Hyperlipidemia LDL goal <100     Overweight(278.02)     Prostate cancer september 2011    followed by urology, Dr. Rogers    Type II or unspecified type diabetes mellitus without mention of complication, not stated as uncontrolled     diet controlled       Past Surgical History:  Past Surgical History:   Procedure Laterality Date    BRONCHOSCOPY N/A 10/15/2018    Procedure: BRONCHOSCOPY;  Surgeon: [facility] Diagnostic Provider;  Location[facility] OR [address] [place] [address];  Service: Anesthesiology;  Laterality: N/A;    CATARACT EXTRACTION, BILATERAL  2014    POSTERIOR CERVICAL LAMINECTOMY Bilateral 4/28/2022    Procedure: LAMINECTOMY, SPINE,  CERVICAL, POSTERIOR APPROACH C3-6;  Surgeon: Carlos Miller MD;  Location[facility] OR [address] [place] [address];  Service: Neurosurgery;  Laterality: Bilateral;    PROSTATECTOMY         Social History:  Social History     Socioeconomic History    Marital status:     Number of children: 2   Occupational History    Occupation: tool    Tobacco Use    Smoking status: Former Smoker     Packs/day: 0.25     Years: 5.00     Pack years: 1.25     Quit date: 10/2/1962     Years since quittin.9    Smokeless tobacco: Former User    Tobacco comment: quit age 23   Substance and Sexual Activity    Alcohol use: Not Currently    Drug use: No    Sexual activity: Yes     Partners: Female     Social Determinants of Health     Financial Resource Strain: Low Risk     Difficulty of Paying Living Expenses: Not hard at all   Food Insecurity: No Food Insecurity    Worried About Running Out of Food in the Last Year: Never true    Ran Out of Food in the Last Year: Never true   Transportation Needs: No Transportation Needs    Lack of Transportation (Medical): No    Lack of Transportation (Non-Medical): No   Physical Activity: Insufficiently Active    Days of Exercise per Week: 5 days    Minutes of Exercise per Session: 10 min   Stress: No Stress Concern Present    Feeling of Stress : Not at all   Social Connections: Moderately Isolated    Frequency of Communication with Friends and Family: More than three times a week    Frequency of Social Gatherings with Friends and Family: Once a week    Attends Shinto Services: Never    Active Member of Clubs or Organizations: No    Attends Club or Organization Meetings: Never    Marital Status:    Housing Stability: Low Risk     Unable to Pay for Housing in the Last Year: No    Number of Places Lived in the Last Year: 1    Unstable Housing in the Last Year: No       Family History:  Family History   Problem Relation Age of Onset    Colon cancer Mother     Diabetes  Mother     Heart disease Father     Mental illness Sister     Diabetes Sister     Other Brother         polio as a child    Pulmonary fibrosis Brother     Diabetes Brother     Myasthenia gravis Brother     Parkinsonism Brother     Diabetes Brother     Dementia Brother     Lung cancer Brother         smoker    Diabetes Maternal Aunt     Diabetes Unknown        Allergies and Medications: (updated and reviewed)  Review of patient's allergies indicates:  No Known Allergies  Current Outpatient Medications   Medication Sig Dispense Refill    azithromycin (ZITHROMAX) 500 MG tablet Take 1 tablet (500 mg total) by mouth once daily. 30 tablet 5    b complex vitamins capsule Take 1 capsule by mouth once daily.      ethambutoL (MYAMBUTOL) 400 MG Tab Take 2 tablets (800 mg total) by mouth once daily. 60 tablet 5    nebulizer and compressor (iKaaz COMPRESSOR SYSTEM) Marcy use to administer nebulized medication as directed 1 each 0    omeprazole (PRILOSEC) 10 MG capsule ONE CAPSULE BY MOUTH once a day as needed      pravastatin (PRAVACHOL) 10 MG tablet Take 1 tablet (10 mg total) by mouth once daily. 90 tablet 0    sodium chloride 3% 3 % nebulizer solution SMARTSIG:Via Inhaler      sodium chloride 3% 3 % nebulizer solution USE 4 ML VIAL IN NEBULIZER  TWICE DAILY 480 mL 11     No current facility-administered medications for this visit.       Patient Care Team:  Portia Ferreira MD as PCP - General (Internal Medicine)  Dustin Mccarthy III, MD as Consulting Physician (Orthopedic Surgery)  Pierre Rogers MD as Consulting Physician (Urology)  Burak Gurrola MD as Consulting Physician (Pulmonary Disease)  Theo Alvares MD as Consulting Physician (Infectious Diseases)  Suzan Glass LPN as Licensed Practical Nurse  Gilberto Silva OD as Consulting Physician (Optometry)  Thien Joy MD as Consulting Physician (Gastroenterology)  Yuan Lundberg MD as Consulting Physician  (Cardiology)  Neel Jimenez MD as Consulting Physician (Neurology)  Paola Rao MD as Consulting Physician (Infectious Diseases)  Fiorella Garcia NP as Nurse Practitioner (Urology)  Carlos Miller MD as Consulting Physician (Neurosurgery)  Ayesha Hall MD as Consulting Physician (Otolaryngology)  KASI Delaney-C (Family Medicine)      The patient expressed understanding and no barriers to adherence were identified.      - The patient indicates understanding of these issues and agrees with the plan. Brief care plan is updated and reviewed with the patient as applicable.      - The patient is given an After Visit Summary that lists all medications with directions, allergies, education, orders placed during this encounter and follow-up instructions.      - I have reviewed the patient's medical information including past medical, family, and social history sections including the medications and allergies.      - We discussed the patient's current medications.     This note was created by combination of typed  and MModal dictation.  Transcription errors may be present.  If there are any questions, please contact me.       Zachariah Bedoya NP

## 2022-08-18 NOTE — PATIENT INSTRUCTIONS
//////////PHONE NUMBERS//////////    Bariatrics---[phone]  Breast Surgery---[phone]  Case Management---[phone]  Colonoscopy---[phone]  Imaging, Xray, CT, MRI, Ultrasound---[phone]  Infectious Disease---[phone]  Interventional Radiology---[phone]  Medical Records---[phone]  [facility] On Call---[phone]  DME---[phone]  Optometry/Ophthalmology---[phone]  [facility]---[phone]  Physical Therapy---[phone]  Psychiatry---[phone]  Plastic Surgery---[phone]  Sleep Study---[phone]  Smoking Cessation---[phone]  Vaccine Hotline---[phone]  Wound Care---[phone]  COVID Vaccine center---[phone]  Referral Desk---[phone]    /////////////////////////////////////////////////

## 2022-08-22 ENCOUNTER — CLINICAL SUPPORT (OUTPATIENT)
Dept: REHABILITATION | Facility: HOSPITAL | Age: 82
End: 2022-08-22
Payer: MEDICARE

## 2022-08-22 DIAGNOSIS — R53.1 FUNCTIONAL WEAKNESS: Primary | ICD-10-CM

## 2022-08-22 DIAGNOSIS — R29.898 UPPER EXTREMITY WEAKNESS: Primary | ICD-10-CM

## 2022-08-22 PROCEDURE — 97110 THERAPEUTIC EXERCISES: CPT

## 2022-08-22 PROCEDURE — 97116 GAIT TRAINING THERAPY: CPT

## 2022-08-22 NOTE — PROGRESS NOTES
Occupational Therapy Daily Treatment Note   Date 8/22/2022    Name: Diego TIRADO [facility] Number: [id number]    Therapy Diagnosis:  Right UE weakness , sp laminectomy and stroke   No diagnosis found.  Physician: Carlos Miller MD    Physician: Carlos Miller MD     Physician Orders: Z98.890 (ICD-10-CM) - Status post laminectomy   eval and treat   Medical Diagnosis: Z98.890 (ICD-10-CM) - Status post laminectomy      Surgical Procedure/ Date :cervical laminectomy 4/28/22 and spinal cord bleed   Mechanism of Injury: Diego reports: Pt with pain beginning at 2:30 am on 4/28/2022; decorticate posturing up upper extremity and unable to move bilateral lower extremities. Pt was rushing to the ER; Pt's wife states MD states he had bleeding from his head into his spine. 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   Pt stayed in hospital for 16 days then was transferred to [facility] for rehabilitation for 2 months (OT, PT, speech therapy). Pt was discharged and received OT/PT Home Health for 1 month. Pt biggest complaints involves hand movements, elbow extension, AROM of shoulder and ambulation. Pt only with complaints of pain from shoulder to hand (7/10) - describes as burning pain. Pt wife states pt struggling with short term memory and speech due to dysphagia after surgery.   Evaluation Date: 8/11/2022  Insurance:[facility] health   Insurance Authorization period Expiration: 9/8/22   Plan of Care Certification Period: 10/31/22   -      Visit # / Visits Authortized: 3  / 11  Time In:9:00  am   Time Out: 9:50 am   Total Billable Time:50  minutes     Precautions: Diabetes    Subjective       Pt reports:  My hand hurts , middle, index and long fingers, I get sharp shooting pain       he was compliant with home exercise program given last session.  Response to previous treatment:increased use of right hand   Functional change: none noted yet     Pain: 5/10 in hand ,  numb and burns at time s  Location: right UE     Objective     Diego received the following supervised modalities after being cleared for contradictions for 10  minutes:   -moist heat right shoulder and right elbow       Diego received  Manual  Therapy   for 10  minutes including: in supine   - STM right hand and forearm   -  and PROM elbow flexion/ extension X 10 reps   And in sidelying shoulder flexion, scaption with manual  scapula support      There ex X 30 minutes    supine Dowel flexion/scaption no weight x10 ea  ER/IR supine x10  tricep ext supine x10  Hand gripper 2 YRB X 2 minutes   Keyhole pin  manipulation 1 sets   Elbow flexion 2# flex/ ext   Red flex bar sup/pro 20 reps    standing weight well 1# ( pt standing with support on wall .  1 min   Shoulder shrugs 30 reps   WB'ing Towel slides in standing  in flexion x20  Weightbearing with BAPS board / marbles 2 minutes     Yellow t band shoulder rows and  And shoulder extension 30 reps ( at home )       Home Exercises and Education Provided     Education provided:   - continue with HEP   - Progress towards goals     Written Home Exercises Provided: Patient instructed to cont prior HEP.    Exercises were reviewed and Diego was able to demonstrate them prior to the end of the session.  Diego demonstrated good  understanding of the education provided.   .   See EMR under Patient Instructions for exercises provided prior visit.       Assessment     Pt would continue to benefit from skilled OT.  Yes    He tolerated session well today. C/o pain with with shoulder flexion in supine, however reported it was tolerable. No pain with elbow flexion/ ext  Will progress WB'ing as tolerated as tolerated for neuro muscular retraining and scapula support for ROM     Diego is progressing well towards his goals and there are no updates to goals at this time. Pt prognosis is Good.     Pt will continue to benefit from skilled outpatient occupational therapy to address the  deficits listed in the problem list on initial evaluation provide pt/family education and to maximize pt's level of independence in the home and community environment.     Anticipated barriers to occupational therapy:  None     Pt's spiritual, cultural and educational needs considered and pt agreeable to plan of care and goals.      GOALS: 12 weeks. Pt agrees with goals set.     Short Term (6 weeks on 9/30/22 ) :  1)   Patient to be IND with HEP and modalities for pain management, progressing   2)   Increase ROM elbow extension and shoulder flexion, ex , scaption 10-15 degrees  For  Right UE  motion to increase functional hand use for right hand , progressing   3)   Increase  strength by  10 lbs. to grasp right hand , progressing   4)   Increase pinch 1-3 psis for  5, progressing   5)   Decrease edema .1-2cm to increase joint mobility /flexibility for improved overall functional hand use. , progressing   6)   Increase pinch 1-3 psi's to increase IND with button and FM Coordination.     Long Term (by discharge):  1)   Pt will report 0 out of 10 pain with right UE ., progressing   2)   Patient to score of 30% on FOTO to demonstrate improved perception of functional right hand/ arm  Use. Progressing   3)   Pt will return to prior level of function for ADLs and household management, progressing         Plan   Certification Period/Plan of care expiration: 8/11/2022 to  10/31/22 .     Discussed Plan of Care with patient: Yes  Updates/Grading for next session:  Yes       Smitha Quick, OT

## 2022-08-22 NOTE — PROGRESS NOTES
[facility]   Physical Therapy Treatment Note     Name: Diego TIRADO [facility] Number: [id number]    Therapy Diagnosis:   Encounter Diagnosis   Name Primary?    Functional weakness Yes     Physician: Carlos Miller MD    Visit Date: 8/22/2022    Physician Orders: PT Eval and Treat   Medical Diagnosis from Referral: Z98.890 (ICD-10-CM) - Status post laminectomy  Evaluation Date: 7/27/2022  Authorization Period Expiration: 08/24/2022  Plan of Care Expiration: 10/24/2022  Progress Note Due: 12th visit   Visit # / Visits authorized: 7 / 11 + eval    PTA Visit #: 0 / 5      Time In: 1000 am   Time Out: 1100 am   Total Appointment Time (timed & untimed codes): 60 minutes (4 TE)     Precautions: Standard; Floyd Catheter since April 28th for urinary retention (removed on 08/05/2022)     SUBJECTIVE     Patient reports: no new complaints; ready for therapy today.   He was compliant with home exercise program.  Response to previous treatment: good; appropriate muscle response  Functional change: none to note today    Pain: 0/10, currently  Location: Right side weakness       OBJECTIVE     Objective Measures updated at progress report unless specified.     Treatment     Diego received the treatments listed below:      THERAPEUTIC EXERCISES to develop strength, endurance, ROM and flexibility for 65 minutes including :  - Aerobic activity and endurance training for reciprocal motion of lower limbs on Nustep x 10 minutes at Level 5.0 at > or equal to 50 spm w/o rest to increase mobility, blood flow and improve tissue tolerance.   - SAQ (medium bolster); 3 x 10 reps on each LE with 5 sec hold, 4 lb AW (cone used to prevent ER)   - Supine Clamshells with green TB; 3 x 10 reps; 5 sec hold   - HL Bridging with green TB; 3 x 10 reps, 5 sec hold   - SLR; 3 x 10 reps (rolled towel under knee for quad set); 2 lb AW  - LAQ; 3 x 10 reps, 5 sec hold with 4 lb AW        - SL hip abduction; 2 x 10 reps (cone used)  - pt hand to hold hip in place    - Sit to stand w/ yellow theraband;  bilateral upper extremity assistance from PT (right lower extremity back left lower extremity in front to promote WB through right lower extremity) - 3 x 10 reps   - Standing marching in walker; 2 x 10 reps; contact guard assist from PT     Diego received the treatments listed below:      GAIT TRAINING to improve functional mobility and safety for 10 minutes.  - Straight cane with max A from PT for correct step pattern   - 350 feet     Patient Education and Home Exercises     Home Exercises Provided and Patient Education Provided     Education provided:   - Will be given at a later visit     Written Home Exercises Provided: Will be given at a later visit . Exercises were reviewed and Diego was able to demonstrate them prior to the end of the session.  Diego demonstrated good  understanding of the education provided. See EMR under Patient Instructions for exercises provided during therapy sessions    ASSESSMENT     Mr. Cortez had good tolerance to exercises performed. Tolerated increase in resistance with SAQ and LAQ with no complaints. Still requiring max verbal and tactile cueing to decrease hip ER when resting. Able to tolerate 2 lb AW with SLR; requiring moderate verbal cueing to maintain TKE. Use of cone and tactile cueing from PT to maintain hip position with SL hip abduction and decrease ankle IR. Marked difficulty seen with seated marching due to poor motor control of hip IR. Increased difficulty with sit to stands today from standard chair with 1 airex. Still requiring staggered stance with sit to stand to ensure promote proper WB throughout right lower extremity. Green theraband used to prevent knee valgus. Marked difficulty with gait training with straight cane; pt's wife educated on importance of pt continuing to use RW around house due to poor foot clearance and max verbal cueing required when using straight cane.       Diego Is  progressing well towards his goals.   Pt prognosis is Excellent.     Pt will continue to benefit from skilled outpatient physical therapy to address the deficits listed in the problem list box on initial evaluation, provide pt/family education and to maximize pt's level of independence in the home and community environment.     Pt's spiritual, cultural and educational needs considered and pt agreeable to plan of care and goals.     Anticipated barriers to physical therapy: none to note     GOALS:   Short Term Goals (6 Weeks):   1.  Independent with initial HEP.  2.  Pt will perform nu-step at level 2.0 x 10 minutes without rests breaks going at least 10 step/min or greater.  3. Increase strength by 1/3 MMT grade in right quadruceps  to increase tolerance for ADL and work activities.  4.  Patient will improve impaired lower extremity manual muscle tests to >/= 3+/5 to improve dynamic right hip/knee support for functional tasks     Long Term Goals (12 Weeks):   1.  Independent with updated HEP.  2.  Pt will perform nu-step at level 4.0 x 15 minutes without rests breaks going at least 12 step/min or greater.  3. Pt will be able to perform TUG in < 10 secs without use of AD placingdemonstrating overall improved functional mobility.   4. Pt will performed 30 sec sit to  15 reps without UE support without LOB backward or posterior LE hooking for functional and safe transfers.   5. Patient will improve impaired lower extremity manual muscle tests to >/= 4/5 to improve dynamic right hip/knee support for functional tasks.   6.  Pt will be able to safely perform and tolerate high level ADL's without LOB.   7. Pt will have 0 falls from start of PT sessions.  8. Pt will ambulate on TM x 10 minutes without use of UE support with 0 LOB at 2.2 mph.  9. Pt will perform floor to stand f/b stand to floor transfer with UE support with stand by assist and no cues for improved dynamic transfer, core stability and fall safety in  home and community.  10. Pt will begin some form of community fitness to begin regular and consistent performance of exercise to continue maintenance of gains made in PT.    PLAN   Emphasize lower extremity strength and improve gait pattern.     Jaimee Reid, PT, DPT

## 2022-08-23 ENCOUNTER — TELEPHONE (OUTPATIENT)
Dept: INFECTIOUS DISEASES | Facility: CLINIC | Age: 82
End: 2022-08-23
Payer: MEDICARE

## 2022-08-23 NOTE — TELEPHONE ENCOUNTER
Spoke with Bernard Hubbard, pharmacist at [facility] and gave the verbal prescription for 7% solution for the pt at 11 refills. Sonali verbally acknowledged the order and agreed to the fill.

## 2022-08-23 NOTE — TELEPHONE ENCOUNTER
----- Message from Paola Rao MD sent at 8/23/2022 10:06 AM CDT -----  Regarding: RE: nacl 3% neb sol equivalent ?  Contact: Addis (wife) @ [phone]  Can you call [facility] and increase the inhaled sodium chloride concentration to 7% (instead of 3%)? 11 refills. Higher concentration works better anyway, but there's not an epic order to e-prescribe. Thanks    ----- Message -----  From: Uziel Tamez LPN  Sent: 8/22/2022   3:49 PM CDT  To: Paola Rao MD  Subject: nacl 3% neb sol equivalent ?                     Is there anything comparable could use instead?   ----- Message -----  From: Keturah Andrade  Sent: 8/22/2022   3:32 PM CDT  To: Maira Guevara Staff    Pts wife says pts prescription for sodium chloride 3% 3 % nebulizer solution was sent to [facility] on 8-15-22 and the pharmacist is having a hard time finding the medication.  She would like to know if it can be increased to a higher dosage and he can break it down when he uses it.  She says he only has enough to last through tomorrow morning.  Pls call.       [facility] - [place] - [address]   Phone:  [phone]  Fax:  [phone]

## 2022-08-24 ENCOUNTER — CLINICAL SUPPORT (OUTPATIENT)
Dept: REHABILITATION | Facility: HOSPITAL | Age: 82
End: 2022-08-24
Payer: MEDICARE

## 2022-08-24 DIAGNOSIS — R53.1 FUNCTIONAL WEAKNESS: Primary | ICD-10-CM

## 2022-08-24 PROCEDURE — 97140 MANUAL THERAPY 1/> REGIONS: CPT

## 2022-08-24 PROCEDURE — 97110 THERAPEUTIC EXERCISES: CPT

## 2022-08-24 PROCEDURE — 97110 THERAPEUTIC EXERCISES: CPT | Mod: CO

## 2022-08-24 PROCEDURE — 97140 MANUAL THERAPY 1/> REGIONS: CPT | Mod: CO

## 2022-08-24 NOTE — PROGRESS NOTES
Occupational Therapy Daily Treatment Note   Date 8/24/2022    Name: Diego TIRADO [facility] Number: [id number]    Therapy Diagnosis:  Right UE weakness , sp laminectomy and stroke   Encounter Diagnosis   Name Primary?    Functional weakness Yes     Physician: Carlos Miller MD    Physician: Carlos Miller MD     Physician Orders: Z98.890 (ICD-10-CM) - Status post laminectomy   eval and treat   Medical Diagnosis: Z98.890 (ICD-10-CM) - Status post laminectomy      Surgical Procedure/ Date :cervical laminectomy 4/28/22 and spinal cord bleed   Mechanism of Injury: Diego reports: Pt with pain beginning at 2:30 am on 4/28/2022; decorticate posturing up upper extremity and unable to move bilateral lower extremities. Pt was rushing to the ER; Pt's wife states MD states he had bleeding from his head into his spine. 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   Pt stayed in hospital for 16 days then was transferred to [facility] for rehabilitation for 2 months (OT, PT, speech therapy). Pt was discharged and received OT/PT Home Health for 1 month. Pt biggest complaints involves hand movements, elbow extension, AROM of shoulder and ambulation. Pt only with complaints of pain from shoulder to hand (7/10) - describes as burning pain. Pt wife states pt struggling with short term memory and speech due to dysphagia after surgery.   Evaluation Date: 8/11/2022  Insurance:[facility] health   Insurance Authorization period Expiration: 9/8/22   Plan of Care Certification Period: 10/31/22   -      Visit # / Visits Authortized: 4 / 11  Time In: 10:50 am   Time Out: 10:47 am   Total Billable Time: 47  minutes     Precautions: Diabetes    Subjective       Pt reports:  I feel the same, but my hand was hurting a lot last night.     he was compliant with home exercise program given last session.  Response to previous treatment:increased use of right hand   Functional change: none  noted yet     Pain: 5/10 in hand , numb and burns at time s  Location: right UE     Objective     Diego received the following supervised modalities after being cleared for contradictions for 8  minutes:   -moist heat right shoulder and right elbow       Diego received  Manual  Therapy   for 15  minutes including: in supine   - STM right hand and forearm   - PROM shoulder all ways supine  and PROM elbow flexion/ extension X 10 reps   And in sidelying shoulder flexion, scaption with manual  scapula support      There ex X 32 minutes    supine Dowel flexion/scaption no weight x10 ea  ER/IR supine x10  tricep ext supine x10  Hand gripper 2 YRB X 2 minutes   Keyhole pin  manipulation 1 sets (NT)  Coins 1 set   Elbow flexion 2# flex/ ext   Punches 1#  Red flex bar sup/pro 20 reps    standing weight well 1# ( pt standing with support on wall .  1 min (NT)  Shoulder shrugs and squeezes  30 reps   WB'ing Towel slides in standing  in flexion x20  Weightbearing with BAPS board / marbles 2 minutes     Yellow t band shoulder rows and  And shoulder extension 30 reps ( at home ) (NT)      Home Exercises and Education Provided     Education provided:   - continue with HEP   - Progress towards goals     Written Home Exercises Provided: Patient instructed to cont prior HEP.    Exercises were reviewed and Diego was able to demonstrate them prior to the end of the session.  Diego demonstrated good  understanding of the education provided.   .   See EMR under Patient Instructions for exercises provided prior visit.       Assessment     Pt would continue to benefit from skilled OT.  Yes    He tolerated session well today. Continues to c/o pain with shoulder flexion. Will progress WB'ing as tolerated as tolerated for neuro muscular retraining and scapula support for ROM     Diego is progressing well towards his goals and there are no updates to goals at this time. Pt prognosis is Good.     Pt will continue to benefit from skilled  outpatient occupational therapy to address the deficits listed in the problem list on initial evaluation provide pt/family education and to maximize pt's level of independence in the home and community environment.     Anticipated barriers to occupational therapy:  None     Pt's spiritual, cultural and educational needs considered and pt agreeable to plan of care and goals.      GOALS: 12 weeks. Pt agrees with goals set.     Short Term (6 weeks on 9/30/22 ) :  1)   Patient to be IND with HEP and modalities for pain management, progressing   2)   Increase ROM elbow extension and shoulder flexion, ex , scaption 10-15 degrees  For  Right UE  motion to increase functional hand use for right hand , progressing   3)   Increase  strength by  10 lbs. to grasp right hand , progressing   4)   Increase pinch 1-3 psis for  5, progressing   5)   Decrease edema .1-2cm to increase joint mobility /flexibility for improved overall functional hand use. , progressing   6)   Increase pinch 1-3 psi's to increase IND with button and FM Coordination.     Long Term (by discharge):  1)   Pt will report 0 out of 10 pain with right UE ., progressing   2)   Patient to score of 30% on FOTO to demonstrate improved perception of functional right hand/ arm  Use. Progressing   3)   Pt will return to prior level of function for ADLs and household management, progressing         Plan   Certification Period/Plan of care expiration: 8/11/2022 to  10/31/22 .     Discussed Plan of Care with patient: Yes  Updates/Grading for next session:  Yes       JAMIE Torres/KVNG

## 2022-08-24 NOTE — PROGRESS NOTES
[facility]   Physical Therapy Treatment Note     Name: Diego TIRADO [facility] [place] Number: [id number]    Therapy Diagnosis:   Encounter Diagnosis   Name Primary?    Functional weakness Yes     Physician: Carlos Miller MD    Visit Date: 8/24/2022    Physician Orders: PT Eval and Treat   Medical Diagnosis from Referral: Z98.890 (ICD-10-CM) - Status post laminectomy  Evaluation Date: 7/27/2022  Authorization Period Expiration: 08/24/2022  Plan of Care Expiration: 10/24/2022  Progress Note Due: 12th visit   Visit # / Visits authorized: 8 / 11 + eval    PTA Visit #: 0 / 5      Time In: 1045 am   Time Out: 1145 am   Total Appointment Time (timed & untimed codes): 60 minutes (4 TE)     Precautions: Standard; Floyd Catheter since April 28th for urinary retention (removed on 08/05/2022)     SUBJECTIVE     Patient reports: no new complaints; ready for therapy today.   He was compliant with home exercise program.  Response to previous treatment: good; appropriate muscle response  Functional change: none to note today    Pain: 0/10, currently  Location: Right side weakness       OBJECTIVE     Objective Measures updated at progress report unless specified.     Treatment     Diego received the treatments listed below:      THERAPEUTIC EXERCISES to develop strength, endurance, ROM and flexibility for 45 minutes including :  - Aerobic activity and endurance training for reciprocal motion of lower limbs on Nustep x 10 minutes at Level 2.0 at > or equal to 50 spm w/o rest to increase mobility, blood flow and improve tissue tolerance.   Parallel bars:   - 2 inch taps; 2 x 10 reps   - 4 inch taps; 2 x 10 reps   - 4 inch step ups; 2 x 10 reps   - 4 inch walk overs - 2 x 10 reps - max verbal cueing   - standing calf raises - 3 x 10 reps   - lateral walks 6 laps - max cueing to decrease hip ER and dragging of feet     - Sit to stand w/ green theraband;  bilateral upper extremity assistance from PT (right lower  extremity back left lower extremity in front to promote WB through right lower extremity) - 3 x 10 reps     GAIT TRAINING to improve functional mobility and safety for 15 minutes.  - Straight cane with max A from PT for correct step pattern; tactile cueing to right lower extremity to improve heel strike and decrease hip circumduction   - 425 feet    Not today:   - SAQ (medium bolster); 3 x 10 reps on each LE with 5 sec hold, 4 lb AW (cone used to prevent ER)   - Supine Clamshells with green TB; 3 x 10 reps; 5 sec hold   - HL Bridging with green TB; 3 x 10 reps, 5 sec hold   - SLR; 3 x 10 reps (rolled towel under knee for quad set); 2 lb AW  - LAQ; 3 x 10 reps, 5 sec hold with 4 lb AW   - SL hip abduction; 2 x 10 reps (cone used) - pt hand to hold hip in place       Patient Education and Home Exercises     Home Exercises Provided and Patient Education Provided     Education provided:   - Will be given at a later visit     Written Home Exercises Provided: Will be given at a later visit . Exercises were reviewed and Diego was able to demonstrate them prior to the end of the session.  Diego demonstrated good  understanding of the education provided. See EMR under Patient Instructions for exercises provided during therapy sessions    ASSESSMENT     Mr. Cortez had good tolerance to exercises performed. Treatment today focusing on functional strengthening to improve strength for transition from RW to straight cane with ambulation. Pt requiring max verbal and tactile cueing for correct limb placement.  Increased difficulty with sit to stands today from standard chair with 1 airex. Still requiring staggered stance with sit to stand to ensure promote proper WB throughout right lower extremity. Green theraband used to prevent knee valgus. Marked difficulty with gait training with straight cane; pt's wife educated on importance of pt continuing to use RW around house due to poor foot clearance and max verbal cueing required  when using straight cane.       Diego Is progressing well towards his goals.   Pt prognosis is Excellent.     Pt will continue to benefit from skilled outpatient physical therapy to address the deficits listed in the problem list box on initial evaluation, provide pt/family education and to maximize pt's level of independence in the home and community environment.     Pt's spiritual, cultural and educational needs considered and pt agreeable to plan of care and goals.     Anticipated barriers to physical therapy: none to note     GOALS:   Short Term Goals (6 Weeks):   1.  Independent with initial HEP.  2.  Pt will perform nu-step at level 2.0 x 10 minutes without rests breaks going at least 10 step/min or greater.  3. Increase strength by 1/3 MMT grade in right quadruceps  to increase tolerance for ADL and work activities.  4.  Patient will improve impaired lower extremity manual muscle tests to >/= 3+/5 to improve dynamic right hip/knee support for functional tasks     Long Term Goals (12 Weeks):   1.  Independent with updated HEP.  2.  Pt will perform nu-step at level 4.0 x 15 minutes without rests breaks going at least 12 step/min or greater.  3. Pt will be able to perform TUG in < 10 secs without use of AD placingdemonstrating overall improved functional mobility.   4. Pt will performed 30 sec sit to  15 reps without UE support without LOB backward or posterior LE hooking for functional and safe transfers.   5. Patient will improve impaired lower extremity manual muscle tests to >/= 4/5 to improve dynamic right hip/knee support for functional tasks.   6.  Pt will be able to safely perform and tolerate high level ADL's without LOB.   7. Pt will have 0 falls from start of PT sessions.  8. Pt will ambulate on TM x 10 minutes without use of UE support with 0 LOB at 2.2 mph.  9. Pt will perform floor to stand f/b stand to floor transfer with UE support with stand by assist and no cues for improved dynamic  transfer, core stability and fall safety in home and community.  10. Pt will begin some form of community fitness to begin regular and consistent performance of exercise to continue maintenance of gains made in PT.    PLAN   Emphasize lower extremity strength and improve gait pattern.     Jaimee Reid, PT, DPT

## 2022-08-26 ENCOUNTER — OFFICE VISIT (OUTPATIENT)
Dept: UROLOGY | Facility: CLINIC | Age: 82
End: 2022-08-26
Payer: MEDICARE

## 2022-08-26 VITALS
BODY MASS INDEX: 23.67 KG/M2 | HEART RATE: 67 BPM | HEIGHT: 69 IN | SYSTOLIC BLOOD PRESSURE: 134 MMHG | DIASTOLIC BLOOD PRESSURE: 79 MMHG | WEIGHT: 159.81 LBS

## 2022-08-26 DIAGNOSIS — K59.00 CONSTIPATION, UNSPECIFIED CONSTIPATION TYPE: ICD-10-CM

## 2022-08-26 DIAGNOSIS — R33.9 URINARY RETENTION: Primary | ICD-10-CM

## 2022-08-26 LAB — POC RESIDUAL URINE VOLUME: 0 ML (ref 0–100)

## 2022-08-26 PROCEDURE — 99214 OFFICE O/P EST MOD 30 MIN: CPT | Mod: S$GLB,,,

## 2022-08-26 PROCEDURE — 99999 PR PBB SHADOW E&M-EST. PATIENT-LVL III: ICD-10-PCS | Mod: PBBFAC,,,

## 2022-08-26 PROCEDURE — 1160F PR REVIEW ALL MEDS BY PRESCRIBER/CLIN PHARMACIST DOCUMENTED: ICD-10-PCS | Mod: CPTII,S$GLB,,

## 2022-08-26 PROCEDURE — 1101F PT FALLS ASSESS-DOCD LE1/YR: CPT | Mod: CPTII,S$GLB,,

## 2022-08-26 PROCEDURE — 1159F MED LIST DOCD IN RCRD: CPT | Mod: CPTII,S$GLB,,

## 2022-08-26 PROCEDURE — 1160F RVW MEDS BY RX/DR IN RCRD: CPT | Mod: CPTII,S$GLB,,

## 2022-08-26 PROCEDURE — 1159F PR MEDICATION LIST DOCUMENTED IN MEDICAL RECORD: ICD-10-PCS | Mod: CPTII,S$GLB,,

## 2022-08-26 PROCEDURE — 51798 US URINE CAPACITY MEASURE: CPT | Mod: S$GLB,,,

## 2022-08-26 PROCEDURE — 3075F PR MOST RECENT SYSTOLIC BLOOD PRESS GE 130-139MM HG: ICD-10-PCS | Mod: CPTII,S$GLB,,

## 2022-08-26 PROCEDURE — 3078F PR MOST RECENT DIASTOLIC BLOOD PRESSURE < 80 MM HG: ICD-10-PCS | Mod: CPTII,S$GLB,,

## 2022-08-26 PROCEDURE — 99214 PR OFFICE/OUTPT VISIT, EST, LEVL IV, 30-39 MIN: ICD-10-PCS | Mod: S$GLB,,,

## 2022-08-26 PROCEDURE — 3075F SYST BP GE 130 - 139MM HG: CPT | Mod: CPTII,S$GLB,,

## 2022-08-26 PROCEDURE — 1157F PR ADVANCE CARE PLAN OR EQUIV PRESENT IN MEDICAL RECORD: ICD-10-PCS | Mod: CPTII,S$GLB,,

## 2022-08-26 PROCEDURE — 3288F PR FALLS RISK ASSESSMENT DOCUMENTED: ICD-10-PCS | Mod: CPTII,S$GLB,,

## 2022-08-26 PROCEDURE — 3288F FALL RISK ASSESSMENT DOCD: CPT | Mod: CPTII,S$GLB,,

## 2022-08-26 PROCEDURE — 99999 PR PBB SHADOW E&M-EST. PATIENT-LVL III: CPT | Mod: PBBFAC,,,

## 2022-08-26 PROCEDURE — 1101F PR PT FALLS ASSESS DOC 0-1 FALLS W/OUT INJ PAST YR: ICD-10-PCS | Mod: CPTII,S$GLB,,

## 2022-08-26 PROCEDURE — 1157F ADVNC CARE PLAN IN RCRD: CPT | Mod: CPTII,S$GLB,,

## 2022-08-26 PROCEDURE — 3078F DIAST BP <80 MM HG: CPT | Mod: CPTII,S$GLB,,

## 2022-08-26 PROCEDURE — 51798 POCT BLADDER SCAN: ICD-10-PCS | Mod: S$GLB,,,

## 2022-08-26 NOTE — PROGRESS NOTES
CHIEF COMPLAINT:  PVR check    HISTORY OF PRESENTING ILLINESS:  Diego Gunter is a 81 y.o. male with PMH antiphospholipid syndrome on chronic coumadin for left PE, DM2, prostate ca s/p prostatectomy 2011, MAC pneumonia dx Fall 2018, mild cognitive impairment, and HLD  04/28/2022 underwent Cervical Spine Surgery/compression of epidural hematoma of cervical spine.     Urology was consulted due to severe hydronephrosis; high PVR.    He was last seen for a VT with ERIC Garcia NP 8/5/22. He passed successfully. Here today for PVR check.    07/05/2022 showed resolution of Hydronephrosis. Complex renal cyst and AML.     He voices some R lower abdominal pain and constipation. No urinary complaints.  His wife has been giving him Miralax x1 week in the AM and added a PM dose the last two days. He is having BMs but they are not large in size. She gave him a dose of dulcolax and fleets enema a couple of days ago which she says produced a large BM. His wife feels that he still has stool that is not being moved. She states he has had constipation since his surgery in April. His wife was inquiring about the use of magnesium citrate. He drinks about  oz of water daily. His PO food intake is adequate, he eats 3 meals/day. He eat pancakes or waffles in the morning.    REVIEW OF SYSTEMS:  Review of Systems   Constitutional: Negative for chills and fever.   HENT: Negative for congestion and sore throat.    Respiratory: Negative for cough and shortness of breath.    Cardiovascular: Negative for chest pain and palpitations.   Gastrointestinal: Negative for nausea and vomiting.   Genitourinary: Negative for dysuria, flank pain, frequency, hematuria and urgency.   Neurological: Negative for dizziness and headaches.         PATIENT HISTORY:    Past Medical History:   Diagnosis Date    Antiphospholipid syndrome 11/19/2021    Dysphagia     Hx of colonic polyps     followed by GI. Dr. Pham    Hyperlipidemia LDL goal <100      Overweight(278.02)     Prostate cancer 2011    followed by urology, Dr. Rogers    Type II or unspecified type diabetes mellitus without mention of complication, not stated as uncontrolled     diet controlled       Past Surgical History:   Procedure Laterality Date    BRONCHOSCOPY N/A 10/15/2018    Procedure: BRONCHOSCOPY;  Surgeon: Pratibha Diagnostic Provider;  Location: [facility] OR [address] [place] [address];  Service: Anesthesiology;  Laterality: N/A;    CATARACT EXTRACTION, BILATERAL      POSTERIOR CERVICAL LAMINECTOMY Bilateral 2022    Procedure: LAMINECTOMY, SPINE, CERVICAL, POSTERIOR APPROACH C3-6;  Surgeon: Carlos Miller MD;  Location: [facility] OR [address] [place] [address];  Service: Neurosurgery;  Laterality: Bilateral;    PROSTATECTOMY         Family History   Problem Relation Age of Onset    Colon cancer Mother     Diabetes Mother     Heart disease Father     Mental illness Sister     Diabetes Sister     Other Brother         polio as a child    Pulmonary fibrosis Brother     Diabetes Brother     Myasthenia gravis Brother     Parkinsonism Brother     Diabetes Brother     Dementia Brother     Lung cancer Brother         smoker    Diabetes Maternal Aunt     Diabetes Unknown        Social History     Socioeconomic History    Marital status:     Number of children: 2   Occupational History    Occupation: tool    Tobacco Use    Smoking status: Former Smoker     Packs/day: 0.25     Years: 5.00     Pack years: 1.25     Quit date: 10/2/1962     Years since quittin.9    Smokeless tobacco: Former User    Tobacco comment: quit age 23   Substance and Sexual Activity    Alcohol use: Not Currently    Drug use: No    Sexual activity: Yes     Partners: Female     Social Determinants of Health     Financial Resource Strain: Low Risk     Difficulty of Paying Living Expenses: Not hard at all   Food Insecurity: No Food Insecurity    Worried About Running Out of Food in the Last Year: Never true     Ran Out of Food in the Last Year: Never true   Transportation Needs: No Transportation Needs    Lack of Transportation (Medical): No    Lack of Transportation (Non-Medical): No   Physical Activity: Insufficiently Active    Days of Exercise per Week: 5 days    Minutes of Exercise per Session: 10 min   Stress: No Stress Concern Present    Feeling of Stress : Not at all   Social Connections: Moderately Isolated    Frequency of Communication with Friends and Family: More than three times a week    Frequency of Social Gatherings with Friends and Family: Once a week    Attends Episcopal Services: Never    Active Member of Clubs or Organizations: No    Attends Club or Organization Meetings: Never    Marital Status:    Housing Stability: Low Risk     Unable to Pay for Housing in the Last Year: No    Number of Places Lived in the Last Year: 1    Unstable Housing in the Last Year: No       Allergies:  Patient has no known allergies.    Medications:    Current Outpatient Medications:     azithromycin (ZITHROMAX) 500 MG tablet, Take 1 tablet (500 mg total) by mouth once daily., Disp: 30 tablet, Rfl: 5    b complex vitamins capsule, Take 1 capsule by mouth once daily., Disp: , Rfl:     ethambutoL (MYAMBUTOL) 400 MG Tab, Take 2 tablets (800 mg total) by mouth once daily., Disp: 60 tablet, Rfl: 5    nebulizer and compressor (OMBRA COMPRESSOR SYSTEM) Marcy, use to administer nebulized medication as directed, Disp: 1 each, Rfl: 0    omeprazole (PRILOSEC) 10 MG capsule, ONE CAPSULE BY MOUTH once a day as needed, Disp: , Rfl:     pravastatin (PRAVACHOL) 10 MG tablet, Take 1 tablet (10 mg total) by mouth once daily., Disp: 90 tablet, Rfl: 0    sodium chloride 3% 3 % nebulizer solution, SMARTSIG:Via Inhaler, Disp: , Rfl:     sodium chloride 3% 3 % nebulizer solution, USE 4 ML VIAL IN NEBULIZER  TWICE DAILY, Disp: 480 mL, Rfl: 11    PHYSICAL EXAMINATION:  Physical Exam  Constitutional:       Appearance: Normal  appearance.   HENT:      Head: Normocephalic and atraumatic.      Right Ear: External ear normal.      Left Ear: External ear normal.   Pulmonary:      Effort: Pulmonary effort is normal. No respiratory distress.   Musculoskeletal:      Comments: walker   Skin:     General: Skin is warm and dry.   Neurological:      General: No focal deficit present.      Mental Status: He is alert and oriented to person, place, and time.   Psychiatric:         Mood and Affect: Mood normal.         Behavior: Behavior normal.           LABS:  UA: unable to provide sample - urinated in waiting room  PVR: 0ml      Lab Results   Component Value Date    PSA 0.02 10/28/2015    PSADIAG 0.02 06/08/2022         IMPRESSION:  Encounter Diagnoses   Name Primary?    Urinary retention Yes    Constipation, unspecified constipation type          Assessment:       1. Urinary retention    2. Constipation, unspecified constipation type        Plan:   1. Urinary retention  - He is emptying his bladder well.    2. Constipation  - Recommended increase in fiber intake from foods and beginning a bowel routine with Metamucil. Stressed the importance of increased water intake to help lubricate GI tract for bowel health. Recommended using stool softeners, osmotic laxatives, glycerin suppositories and enemas as needed - best not to use daily.  - Suggested choosing fibrous foods in the morning for breakfast instead of waffles or pancakes which typically do not contain a moderate or high fiber content.  - Referral to GI placed to discuss constipation.    Follow up as needed, ERIC Garcia NP recommended a 6 week follow up following VT 8/5/22    I spent 30 minutes with the patient of which more than half was spent in direct consultation with the patient in regards to our treatment and plan.  We addressed the office findings and recent labs.   Education and recommendations of today's plan of care including home remedies and needed follow up with PCP.   We discussed  the chief complaint/LUTS and the possible contributory factors.   Recommended lifestyle modifications with proper, healthy diet, good hydration if no fluid restrictions; reducing bladder irritants.   Benefits of regular exercise approved by PCP.

## 2022-08-29 ENCOUNTER — CLINICAL SUPPORT (OUTPATIENT)
Dept: REHABILITATION | Facility: HOSPITAL | Age: 82
End: 2022-08-29
Payer: MEDICARE

## 2022-08-29 DIAGNOSIS — R53.1 FUNCTIONAL WEAKNESS: Primary | ICD-10-CM

## 2022-08-29 PROCEDURE — 97110 THERAPEUTIC EXERCISES: CPT | Mod: CQ

## 2022-08-29 PROCEDURE — 97110 THERAPEUTIC EXERCISES: CPT

## 2022-08-29 NOTE — PROGRESS NOTES
"[facility]   Physical Therapy Treatment Note     Name: Diego TIRADO [facility] Number: [id number]    Therapy Diagnosis:   Encounter Diagnosis   Name Primary?    Functional weakness Yes     Physician: Carlos Miller MD    Visit Date: 8/29/2022    Physician Orders: PT Eval and Treat   Medical Diagnosis from Referral: Z98.890 (ICD-10-CM) - Status post laminectomy  Evaluation Date: 7/27/2022  Authorization Period Expiration: 08/24/2022  Plan of Care Expiration: 10/24/2022  Progress Note Due: 12th visit   Visit # / Visits authorized: 10 / 11 + eval    PTA Visit #: 1 / 5      Time In: 0954  Time Out: 1050  Total Appointment Time (timed & untimed codes): 56 minutes (4 TE)     Precautions: Standard; Floyd Catheter since April 28th for urinary retention (removed on 08/05/2022)     SUBJECTIVE     Patient reports: that he has no pain. States he feels that we are making progress in therapy. Has difficulty using the cane. Per wife, patient went to a restaurant and used his walker. States that his Left knee gave out on him twice. Wife reports that their grandson caught him and stopped him from potentially falling.  He was not compliant with home exercise program secondary to not receiving one.  Response to previous treatment: good; appropriate muscle response  Functional change: ambulated into clinic with RW; states trying to use his cane but he cannot get the coordination    Pain: 0/10, currently  Location: Right side weakness     Patients goals: "return to independence and be able to go back to fitness center without assistance"    OBJECTIVE     Objective Measures updated at progress report unless specified.     Treatment     Diego received the treatments listed below:      THERAPEUTIC EXERCISES to develop strength, endurance, ROM and flexibility for 56 minutes including :  - Aerobic activity and endurance training for reciprocal motion of lower limbs on Nustep for 10 minutes at Level 3.0 at > or " equal to 50 spm w/o rest to increase mobility, blood flow and improve tissue tolerance.     Parallel bars:   - 2-inch taps; 1 minute, 3 trials  - 4-inch taps; 1 minute, 3 trials  - 4-inch step ups; 2 x 10 reps with each LE leading  - 4-inch walk overs; 2 x 10 reps, max verbal cueing   - Standing calf raises; 3 x 10 reps   - Lateral walks 6 laps; max cueing to decrease hip ER and dragging of feet     - Sit to stands with Green theraband; (Right lower extremity back, Left lower extremity in front to promote WB through right lower extremity); 3 x 10 reps, required no assist from PT    GAIT TRAINING to improve functional mobility and safety for 00  minutes.  - Straight cane with max A from PT for correct step pattern; tactile cueing to right lower extremity to improve heel strike and decrease hip circumduction for 425 feet.    Patient Education and Home Exercises     Home Exercises Provided and Patient Education Provided     Education provided:   - Issued an HEP this visit to help improve LE strength and motor control outside of PT. See media (8/29/2022).    Written Home Exercises Provided: yes, see media (8/29/2022). Exercises were reviewed and Diego was able to demonstrate them prior to the end of the session.  Diego demonstrated good  understanding of the education provided. See EMR under Patient Instructions for exercises provided during therapy sessions    ASSESSMENT     Requires mod cueing for hip flexion with toe taps and step ups to avoid circumduction on RLE to make up for lack of DF. He ambulates around the clinic with no AD, CGA by therapist. Max cueing for hip flexion and heel toe gait pattern with ambulation. Patient compensates with ER of Left foot secondary to decreased hip strength and motor control.   Diego Is progressing well towards his goals.   Pt prognosis is Excellent.     Pt will continue to benefit from skilled outpatient physical therapy to address the deficits listed in the problem list box  on initial evaluation, provide pt/family education and to maximize pt's level of independence in the home and community environment.     Pt's spiritual, cultural and educational needs considered and pt agreeable to plan of care and goals.     Anticipated barriers to physical therapy: none to note     GOALS:   Short Term Goals (6 Weeks):   1.  Independent with initial HEP. - Appropriate, ongoing   2.  Pt will perform nu-step at level 2.0 x 10 minutes without rests breaks going at least 10 step/min or greater. - MET (8/29/2022)  3.  Increase strength by 1/3 MMT grade in right quadruceps  to increase tolerance for ADL and work activities. - Appropriate, ongoing   4.  Patient will improve impaired lower extremity manual muscle tests to >/= 3+/5 to improve dynamic right hip/knee support for functional tasks. - Appropriate, ongoing      Long Term Goals (12 Weeks):   1. Independent with updated HEP. - Appropriate, ongoing   2. Pt will perform nu-step at level 4.0 x 15 minutes without rests breaks going at least 12 step/min or greater. - Appropriate, ongoing   3. Pt will be able to perform TUG in < 10 secs without use of AD placingdemonstrating overall improved functional mobility. - Appropriate, ongoing   4. Pt will performed 30 sec sit to  15 reps without UE support without LOB backward or posterior LE hooking for functional and safe transfers. - Appropriate, ongoing   5. Patient will improve impaired lower extremity manual muscle tests to >/= 4/5 to improve dynamic right hip/knee support for functional tasks. - Appropriate, ongoing   6. Pt will be able to safely perform and tolerate high level ADL's without LOB. - Appropriate, ongoing   7. Pt will have 0 falls from start of PT sessions. - Appropriate, ongoing   8. Pt will ambulate on TM x 10 minutes without use of UE support with 0 LOB at 2.2 mph. - Appropriate, ongoing   9. Pt will perform floor to stand f/b stand to floor transfer with UE support with stand by  assist and no cues for improved dynamic transfer, core stability and fall safety in home and community. - Appropriate, ongoing   10. Pt will begin some form of community fitness to begin regular and consistent performance of exercise to continue maintenance of gains made in PT. - Appropriate, ongoing     PLAN     Emphasize lower extremity strength and improve gait pattern.     Tracey Ruiz, PTA

## 2022-08-29 NOTE — PROGRESS NOTES
"                            Occupational Therapy Daily Treatment Note   Date 8/29/2022    Name: Diego TIRADO [facility] Number: [id number]    Therapy Diagnosis:  Right UE weakness , sp laminectomy and stroke   Encounter Diagnosis   Name Primary?    Functional weakness Yes     Physician: Carlos Miller MD       Physician Orders: Z98.890 (ICD-10-CM) - Status post laminectomy   eval and treat   Medical Diagnosis: Z98.890 (ICD-10-CM) - Status post laminectomy      Surgical Procedure/ Date :cervical laminectomy 4/28/22 and spinal cord bleed   Mechanism of Injury: Diego reports: Pt with pain beginning at 2:30 am on 4/28/2022; decorticate posturing up upper extremity and unable to move bilateral lower extremities. Pt was rushing to the ER; Pt's wife states MD states he had bleeding from his head into his spine. 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   Pt stayed in hospital for 16 days then was transferred to [facility] for rehabilitation for 2 months (OT, PT, speech therapy). Pt was discharged and received OT/PT Home Health for 1 month. Pt biggest complaints involves hand movements, elbow extension, AROM of shoulder and ambulation. Pt only with complaints of pain from shoulder to hand (7/10) - describes as burning pain. Pt wife states pt struggling with short term memory and speech due to dysphagia after surgery.   Evaluation Date: 8/11/2022  Insurance:[facility]   Insurance Authorization period Expiration: 9/8/22   Plan of Care Certification Period: 10/31/22   -      Visit # / Visits Authortized: 5 / 11  Time In: 9:00 am   Time Out: 10:00 am   Total Billable Time: 35  minutes     Precautions: Diabetes    Subjective       Pt reports:  "my hand was hurting a lot last night." It wakes me up at night "      he was compliant with home exercise program given last session.  Response to previous treatment:increased use of right hand   Functional change: none noted yet     Pain: 5/10 in " hand , numb and burns at time s  Location: right UE     Objective       Diego received the following supervised modalities after being cleared for contradictions for 8 minutes:   -moist heat right shoulder and right elbow       Diego received  Manual  Therapy   for 15  minutes including: in supine   - STM right hand and forearm   - PROM shoulder all ways supine  and PROM elbow flexion/ extension X 10 reps   And in sidelying shoulder flexion, scaption with manual  scapula support      There ex X 32 minutes    supine Dowel flexion/scaption no weight x10 ea  ER/IR supine x10  tricep ext supine x10  Hand gripper 2 YRB X 2 minutes   Keyhole pin  manipulation 1 sets (NT)  Coins 1 set   Elbow flexion 2# flex/ ext   Punches 1#  Red flex bar sup/pro 20 reps    standing weight well 1# ( pt standing with support on wall .  1 min (NT)  Shoulder shrugs and squeezes  30 reps   WB'ing Towel slides in standing  in flexion x20  Weightbearing with BAPS board / marbles 2 minutes     Yellow t band shoulder rows and  And shoulder extension 30 reps ( at home ) (NT)      Home Exercises and Education Provided     Education provided:   - continue with HEP   - Progress towards goals     Written Home Exercises Provided: Patient instructed to cont prior HEP.    Exercises were reviewed and Diego was able to demonstrate them prior to the end of the session.  Diego demonstrated good  understanding of the education provided.   .   See EMR under Patient Instructions for exercises provided prior visit.       Assessment     Pt would continue to benefit from skilled OT.  Yes    He tolerated session well today. Continues to c/o pain with shoulder flexion. Will progress WB'ing as tolerated as tolerated for neuro muscular retraining and scapula support for ROM     Diego is progressing well towards his goals and there are no updates to goals at this time. Pt prognosis is Good.     Pt will continue to benefit from skilled outpatient occupational  therapy to address the deficits listed in the problem list on initial evaluation provide pt/family education and to maximize pt's level of independence in the home and community environment.     Anticipated barriers to occupational therapy:  None     Pt's spiritual, cultural and educational needs considered and pt agreeable to plan of care and goals.      GOALS: 12 weeks. Pt agrees with goals set.     Short Term (6 weeks on 9/30/22 ) .     1)   Patient to be IND with HEP and modalities for pain management, progressing   2)   Increase ROM elbow extension and shoulder flexion, ex , scaption 10-15 degrees  For  Right UE  motion to increase functional hand use for right hand , progressing   3)   Increase  strength by  10 lbs. to grasp right hand , progressing   4)   Increase pinch 1-3 psis for  5, progressing   5)   Decrease edema .1-2cm to increase joint mobility /flexibility for improved overall functional hand use. , progressing   6)   Increase pinch 1-3 psi's to increase IND with button and FM Coordination.     Long Term (by discharge):  1)   Pt will report 0 out of 10 pain with right UE ., progressing   2)   Patient to score of 30% on FOTO to demonstrate improved perception of functional right hand/ arm  Use. Progressing   3)   Pt will return to prior level of function for ADLs and household management, progressing         Plan   Certification Period/Plan of care expiration: 8/11/2022 to  10/31/22 .     Discussed Plan of Care with patient: Yes  Updates/Grading for next session:  Yes       Smitha Quick OT

## 2022-08-30 ENCOUNTER — LAB VISIT (OUTPATIENT)
Dept: LAB | Facility: HOSPITAL | Age: 82
End: 2022-08-30
Attending: INTERNAL MEDICINE
Payer: MEDICARE

## 2022-08-30 DIAGNOSIS — A31.0 MYCOBACTERIUM AVIUM-INTRACELLULARE COMPLEX: ICD-10-CM

## 2022-08-30 PROCEDURE — 87116 MYCOBACTERIA CULTURE: CPT | Performed by: INTERNAL MEDICINE

## 2022-08-30 PROCEDURE — 87186 SC STD MICRODIL/AGAR DIL: CPT | Performed by: INTERNAL MEDICINE

## 2022-08-30 PROCEDURE — 87015 SPECIMEN INFECT AGNT CONCNTJ: CPT | Performed by: INTERNAL MEDICINE

## 2022-08-30 PROCEDURE — 87206 SMEAR FLUORESCENT/ACID STAI: CPT | Performed by: INTERNAL MEDICINE

## 2022-08-30 PROCEDURE — 87149 DNA/RNA DIRECT PROBE: CPT | Performed by: INTERNAL MEDICINE

## 2022-08-30 PROCEDURE — 87118 MYCOBACTERIC IDENTIFICATION: CPT | Mod: 59 | Performed by: INTERNAL MEDICINE

## 2022-08-30 NOTE — PROGRESS NOTES
Occupational Therapy Daily Treatment Note   Date 8/31/2022    Name: Diego TIRADO [facility] Number: [id number]    Therapy Diagnosis:  Right UE weakness , sp laminectomy and stroke   Encounter Diagnosis   Name Primary?    Functional weakness Yes       Physician: Carlos Miller MD       Physician Orders: Z98.890 (ICD-10-CM) - Status post laminectomy   eval and treat   Medical Diagnosis: Z98.890 (ICD-10-CM) - Status post laminectomy      Surgical Procedure/ Date :cervical laminectomy 4/28/22 and spinal cord bleed   Mechanism of Injury: Diego reports: Pt with pain beginning at 2:30 am on 4/28/2022; decorticate posturing up upper extremity and unable to move bilateral lower extremities. Pt was rushing to the ER; Pt's wife states MD states he had bleeding from his head into his spine. 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   Pt stayed in hospital for 16 days then was transferred to [facility] for rehabilitation for 2 months (OT, PT, speech therapy). Pt was discharged and received OT/PT Home Health for 1 month. Pt biggest complaints involves hand movements, elbow extension, AROM of shoulder and ambulation. Pt only with complaints of pain from shoulder to hand (7/10) - describes as burning pain. Pt wife states pt struggling with short term memory and speech due to dysphagia after surgery.   Evaluation Date: 8/11/2022  Insurance:[employer]   Insurance Authorization period Expiration: 9/8/22   Plan of Care Certification Period: 10/31/22   -      Visit # / Visits Authortized: 6 / 11  Time In: 9:00 am   Time Out: 9:57 am   Total Billable Time: 57  minutes     Precautions: Diabetes    Subjective       Pt reports: continues to c/o aching hand pain that wakes him up at night     he was compliant with home exercise program given last session.  Response to previous treatment:increased use of right hand   Functional change: none noted yet     Pain:  "0/10  Location: right UE     Objective       Diego received the following supervised modalities after being cleared for contradictions for 8 minutes:   -moist heat right shoulder and right elbow       Diego received  Manual  Therapy   for 15  minutes including: in supine   - STM right hand and forearm   - PROM shoulder all ways supine  and PROM elbow and wrist  flexion/ extension,  X 10 reps   And in sidelying shoulder flexion, scaption with manual  scapula support      There ex X 34 minutes    supine Dowel flexion/scaption/chest press no weight x20 ea  ER/IR supine x10  tricep ext supine x10(NT)  Hand gripper 3 YRB X 2 minutes   Keyhole pin  manipulation 1 sets (NT)  Coins 1 set (NT)   Elbow flexion 2# flex/ ext (NT)  Punches 1# (NT)  Green flex bar sup/pro 20 reps    standing weight well 1# ( pt standing with support on wall .  1 min (NT)  Shoulder shrugs and squeezes  30 reps (NT)  WB'ing Towel slides in standing  in flexion x20 (NT)  Weightbearing with BAPS board / marbles 2 minutes   UBE L1 FW/BW x2 min ea way    Yellow t band shoulder rows and  And shoulder extension 30 reps ( at home ) (NT)      Home Exercises and Education Provided     Education provided:   - continue with HEP   - Progress towards goals     Written Home Exercises Provided: Patient instructed to cont prior HEP.    Exercises were reviewed and Diego was able to demonstrate them prior to the end of the session.  Diego demonstrated good  understanding of the education provided.   .   See EMR under Patient Instructions for exercises provided prior visit.       Assessment     Pt would continue to benefit from skilled OT.  Yes    He tolerated session well today. Dispalyed incr edema in hand today. Continues to c/o pain with shoulder flexion, however improving. Good tolerance for strengthening upgrades today. UBE added to tx today to promote scapulohumeral rhythm, verbalized "feeling good" post. Will progress WB'ing as tolerated as tolerated for " neuro muscular retraining and scapula support for RICHARD Cortez is progressing well towards his goals and there are no updates to goals at this time. Pt prognosis is Good.     Pt will continue to benefit from skilled outpatient occupational therapy to address the deficits listed in the problem list on initial evaluation provide pt/family education and to maximize pt's level of independence in the home and community environment.     Anticipated barriers to occupational therapy:  None     Pt's spiritual, cultural and educational needs considered and pt agreeable to plan of care and goals.      GOALS: 12 weeks. Pt agrees with goals set.     Short Term (6 weeks on 9/30/22 ) .     1)   Patient to be IND with HEP and modalities for pain management, progressing   2)   Increase ROM elbow extension and shoulder flexion, ex , scaption 10-15 degrees  For  Right UE  motion to increase functional hand use for right hand , progressing   3)   Increase  strength by  10 lbs. to grasp right hand , progressing   4)   Increase pinch 1-3 psis for  5, progressing   5)   Decrease edema .1-2cm to increase joint mobility /flexibility for improved overall functional hand use. , progressing   6)   Increase pinch 1-3 psi's to increase IND with button and FM Coordination.     Long Term (by discharge):  1)   Pt will report 0 out of 10 pain with right UE ., progressing   2)   Patient to score of 30% on FOTO to demonstrate improved perception of functional right hand/ arm  Use. Progressing   3)   Pt will return to prior level of function for ADLs and household management, progressing         Plan   Certification Period/Plan of care expiration: 8/11/2022 to  10/31/22 .     Discussed Plan of Care with patient: Yes  Updates/Grading for next session:  Yes       JAMIE Torres/KVNG

## 2022-08-31 ENCOUNTER — CLINICAL SUPPORT (OUTPATIENT)
Dept: REHABILITATION | Facility: HOSPITAL | Age: 82
End: 2022-08-31
Payer: MEDICARE

## 2022-08-31 DIAGNOSIS — R53.1 FUNCTIONAL WEAKNESS: Primary | ICD-10-CM

## 2022-08-31 PROCEDURE — 97110 THERAPEUTIC EXERCISES: CPT

## 2022-08-31 PROCEDURE — 97530 THERAPEUTIC ACTIVITIES: CPT

## 2022-08-31 PROCEDURE — 97110 THERAPEUTIC EXERCISES: CPT | Mod: CO

## 2022-08-31 PROCEDURE — 97116 GAIT TRAINING THERAPY: CPT

## 2022-08-31 PROCEDURE — 97140 MANUAL THERAPY 1/> REGIONS: CPT | Mod: CO

## 2022-08-31 NOTE — PROGRESS NOTES
[facility]  Physical Therapy Plan of Care Note    Name: Diego Gunter  Clinic Number: [id number]    Therapy Diagnosis:   Encounter Diagnosis   Name Primary?    Functional weakness Yes     Physician: Carlos Miller MD    Visit Date: 2022  Physician Orders: PT Eval and Treat   Medical Diagnosis from Referral: Z98.890 (ICD-10-CM) - Status post laminectomy  Evaluation Date: 2022  Authorization Period Expiration: 2022  Plan of Care Expiration: 10/24/2022  Progress Note Due: 12th visit   Visit # / Visits authorized:  + eval    PTA Visit #: 0/        Precautions: Standard  Functional Level Prior to Evaluation:  Independent / Gym 3x/week / Ambulating with no AD     SUBJECTIVE     Update: Pt wife states he is doing much better at home.  She states he is able to with help get into and out of the bath.  He is walking still with a RW but has a goal of walking with a SC to his grandson's wedding in September with a cane.  He still has intermittent periods of weakness and right knee buckling especially when getting out of a car or just sitting for long periods.      OBJECTIVE     Update:     Gait: Ambulates to clinic with RW with mod I and fairly normal pattern with slight circumduction with his R LE    MMT:  R LE: 4/5 in major muscle groups of hip and knee    Functional Tests:    TU sec with use of RW   30 sec sit to stand: 9 reps  ASSESSMENT     Update: Patient functional mobility skills safety is improving and he is tolerating more ADLs and MRADLs with decreased caregiver assist required.  He does remain with residual R side weakness and low endurance to functional activities as his day progresses.  Pt would continue to benefit from PT to maximize his functional potential and promote highest level of safe independent functioning.      Previous Short Term Goals Status:   MET  New Short Term Goals Status:   n/a  Long Term Goal Status: continue per initial plan of  care.  Reasons for Recertification of Therapy:   See above assessment     GOALS    GOALS:   Short Term Goals (6 Weeks): MET   1.  Independent with initial HEP.  2.  Pt will perform nu-step at level 2.0 x 10 minutes without rests breaks going at least 10 step/min or greater.  3. Increase strength by 1/3 MMT grade in right quadruceps  to increase tolerance for ADL and work activities.  4.  Patient will improve impaired lower extremity manual muscle tests to >/= 3+/5 to improve dynamic right hip/knee support for functional tasks     Long Term Goals (12 Weeks): Progressing towards  1.  Independent with updated HEP.  2.  Pt will perform nu-step at level 4.0 x 15 minutes without rests breaks going at least 12 step/min or greater.  3. Pt will be able to perform TUG in < 10 secs without use of AD placingdemonstrating overall improved functional mobility.   4. Pt will performed 30 sec sit to  15 reps without UE support without LOB backward or posterior LE hooking for functional and safe transfers.   5. Patient will improve impaired lower extremity manual muscle tests to >/= 4/5 to improve dynamic right hip/knee support for functional tasks.   6.  Pt will be able to safely perform and tolerate high level ADL's without LOB.   7. Pt will have 0 falls from start of PT sessions.  8. Pt will ambulate on TM x 10 minutes without use of UE support with 0 LOB at 2.2 mph.  9. Pt will perform floor to stand f/b stand to floor transfer with UE support with stand by assist and no cues for improved dynamic transfer, core stability and fall safety in home and community.  10. Pt will begin some form of community fitness to begin regular and consistent performance of exercise to continue maintenance of gains made in PT.  PLAN     Updated Certification Period: 08/31/22 to 10/1/2022   Recommended Treatment Plan: 2 times per week for 4 weeks:  Gait Training, Manual Therapy, Neuromuscular Re-ed, Patient Education, Self Care, Therapeutic  "Activities, and Therapeutic Exercise  Other Recommendations: None     Cary Navarro, PT    I CERTIFY THE NEED FOR THESE SERVICES FURNISHED UNDER THIS PLAN OF TREATMENT AND WHILE UNDER MY CARE  Physician's comments:      Physician's Signature: ___________________________________________________                                  [facility]PAT[facility]ERAP[facility] AND [facility]   Physical Therapy Treatment Note     Name: Diego TIRADO [facility] Number: [id number]    Therapy Diagnosis:   Encounter Diagnosis   Name Primary?    Functional weakness Yes     Physician: Carlos Miller MD    Visit Date: 8/31/2022    Physician Orders: PT Eval and Treat   Medical Diagnosis from Referral: Z98.890 (ICD-10-CM) - Status post laminectomy  Evaluation Date: 7/27/2022  Authorization Period Expiration: 08/24/2022  Plan of Care Expiration: 10/24/2022  Progress Note Due: 12th visit   Visit # / Visits authorized: 11/ 11 + eval    PTA Visit #: 0/ 5      Time In: 0954  Time Out: 1050  Total Appointment Time (timed & untimed codes): 56 minutes (4 TE)     Precautions: Standard; Floyd Catheter since April 28th for urinary retention (removed on 08/05/2022)     SUBJECTIVE     Patient reports: he is doing ok today.  No new c/o.        He was not compliant with home exercise program secondary to not receiving one.  Response to previous treatment: good; appropriate muscle response  Functional change: ambulated into clinic with RW; states trying to use his cane but he cannot get the coordination    Pain: 0/10, currently  Location: Right side weakness     Patients goals: "return to independence and be able to go back to fitness center without assistance"    OBJECTIVE     Objective Measures updated at progress report unless specified.     Treatment     Diego received the treatments listed below:      THERAPEUTIC EXERCISES to develop strength, endurance, ROM and flexibility for 56 minutes including :  - Aerobic activity and endurance training for reciprocal " motion of lower limbs on Nustep for 10 minutes at Level 3.0 at > or equal to 50 spm w/o rest to increase mobility, blood flow and improve tissue tolerance.     Parallel bars:   - 2-inch taps; 1 minute, 3 trials  - 4-inch taps; 1 minute, 3 trials  - 4-inch step ups; 2 x 10 reps with each LE leading  - 4-inch walk overs; 2 x 10 reps, max verbal cueing   - Standing calf raises; 3 x 10 reps   - Lateral walks 6 laps; max cueing to decrease hip ER and dragging of feet     - Sit to stands with Green theraband; (Right lower extremity back, Left lower extremity in front to promote WB through right lower extremity); 3 x 10 reps, required no assist from PT    GAIT TRAINING to improve functional mobility and safety for 00  minutes.  - Straight cane with max A from PT for correct step pattern; tactile cueing to right lower extremity to improve heel strike and decrease hip circumduction for 425 feet.    Patient Education and Home Exercises     Home Exercises Provided and Patient Education Provided     Education provided:   - Issued an HEP this visit to help improve LE strength and motor control outside of PT. See media (8/29/2022).    Written Home Exercises Provided: yes, see media (8/29/2022). Exercises were reviewed and Diego was able to demonstrate them prior to the end of the session.  Diego demonstrated good  understanding of the education provided. See EMR under Patient Instructions for exercises provided during therapy sessions    ASSESSMENT     Requires mod cueing for hip flexion with toe taps and step ups to avoid circumduction on RLE to make up for lack of DF. He ambulates around the clinic with no AD, CGA by therapist. Max cueing for hip flexion and heel toe gait pattern with ambulation. Patient compensates with ER of Left foot secondary to decreased hip strength and motor control.   Diego Is progressing well towards his goals.   Pt prognosis is Excellent.     Pt will continue to benefit from skilled outpatient  physical therapy to address the deficits listed in the problem list box on initial evaluation, provide pt/family education and to maximize pt's level of independence in the home and community environment.     Pt's spiritual, cultural and educational needs considered and pt agreeable to plan of care and goals.     Anticipated barriers to physical therapy: none to note     GOALS:   Short Term Goals (6 Weeks):   1.  Independent with initial HEP. - Appropriate, ongoing   2.  Pt will perform nu-step at level 2.0 x 10 minutes without rests breaks going at least 10 step/min or greater. - MET (8/29/2022)  3.  Increase strength by 1/3 MMT grade in right quadruceps  to increase tolerance for ADL and work activities. - Appropriate, ongoing   4.  Patient will improve impaired lower extremity manual muscle tests to >/= 3+/5 to improve dynamic right hip/knee support for functional tasks. - Appropriate, ongoing      Long Term Goals (12 Weeks):   1. Independent with updated HEP. - Appropriate, ongoing   2. Pt will perform nu-step at level 4.0 x 15 minutes without rests breaks going at least 12 step/min or greater. - Appropriate, ongoing   3. Pt will be able to perform TUG in < 10 secs without use of AD placingdemonstrating overall improved functional mobility. - Appropriate, ongoing   4. Pt will performed 30 sec sit to  15 reps without UE support without LOB backward or posterior LE hooking for functional and safe transfers. - Appropriate, ongoing   5. Patient will improve impaired lower extremity manual muscle tests to >/= 4/5 to improve dynamic right hip/knee support for functional tasks. - Appropriate, ongoing   6. Pt will be able to safely perform and tolerate high level ADL's without LOB. - Appropriate, ongoing   7. Pt will have 0 falls from start of PT sessions. - Appropriate, ongoing   8. Pt will ambulate on TM x 10 minutes without use of UE support with 0 LOB at 2.2 mph. - Appropriate, ongoing   9. Pt will perform  floor to stand f/b stand to floor transfer with UE support with stand by assist and no cues for improved dynamic transfer, core stability and fall safety in home and community. - Appropriate, ongoing   10. Pt will begin some form of community fitness to begin regular and consistent performance of exercise to continue maintenance of gains made in PT. - Appropriate, ongoing     PLAN     Emphasize lower extremity strength and improve gait pattern.     Cary Navarro, PT

## 2022-09-01 ENCOUNTER — TELEPHONE (OUTPATIENT)
Dept: FAMILY MEDICINE | Facility: CLINIC | Age: 82
End: 2022-09-01
Payer: MEDICARE

## 2022-09-01 NOTE — TELEPHONE ENCOUNTER
----- Message from Rachel Bedoya sent at 9/1/2022 10:40 AM CDT -----  Type:  Patient Requesting Referral    Who Called: Addis- Wife     Referral to What Specialty: Neuro Orthopedic     Reason for Referral:Hematoma on spine had surgery and complains a lot about his arm that went into a stroke position and it's painful, he goes to therapy and they suggest him to see a Neuro Orthopedic     Does the patient want the referral with a specific physician?:    [facility] the [facility] or [facility]?:    Would the patient rather a call back or a response via [facility]? Call     Best Call Back Number:[phone]

## 2022-09-02 LAB
ACID FAST MOD KINY STN SPEC: ABNORMAL
MYCOBACTERIUM SPEC QL CULT: ABNORMAL

## 2022-09-06 ENCOUNTER — CLINICAL SUPPORT (OUTPATIENT)
Dept: REHABILITATION | Facility: HOSPITAL | Age: 82
End: 2022-09-06
Payer: MEDICARE

## 2022-09-06 DIAGNOSIS — R53.1 FUNCTIONAL WEAKNESS: Primary | ICD-10-CM

## 2022-09-06 PROCEDURE — 97140 MANUAL THERAPY 1/> REGIONS: CPT | Mod: CO

## 2022-09-06 PROCEDURE — 97110 THERAPEUTIC EXERCISES: CPT | Mod: CO

## 2022-09-06 PROCEDURE — 97530 THERAPEUTIC ACTIVITIES: CPT

## 2022-09-06 PROCEDURE — 97022 WHIRLPOOL THERAPY: CPT | Mod: CO

## 2022-09-06 PROCEDURE — 97110 THERAPEUTIC EXERCISES: CPT

## 2022-09-06 NOTE — PROGRESS NOTES
"  [facility]   Physical Therapy Treatment Note     Name: Diego TIRADO [facility]  Clinic Number: [id number]    Therapy Diagnosis:   Encounter Diagnosis   Name Primary?    Functional weakness Yes     Physician: Carlos Miller MD    Visit Date: 9/6/2022    Physician Orders: PT Eval and Treat   Medical Diagnosis from Referral: Z98.890 (ICD-10-CM) - Status post laminectomy  Evaluation Date: 7/27/2022  Authorization Period Expiration: 08/24/2022  Plan of Care Expiration: 10/24/2022  Progress Note Due: 12th visit   Visit # / Visits authorized: 12/17    PTA Visit #: 0/ 5      Time In: 1000  Time Out: 1100  Total Appointment Time (timed & untimed codes): 60 minutes (2TE, 2 TA )     Precautions: Standard; Floyd Catheter since April 28th for urinary retention (removed on 08/05/2022)     SUBJECTIVE     Patient reports: he is doing ok today.  No new c/o.        He was not compliant with home exercise program secondary to not receiving one.  Response to previous treatment: good; appropriate muscle response  Functional change: ambulated into clinic with RW; states trying to use his cane but he cannot get the coordination    Pain: 0/10, currently  Location: Right side weakness     Patients goals: "return to independence and be able to go back to fitness center without assistance"    OBJECTIVE     Objective Measures updated at progress report unless specified.     Treatment     Diego received the treatments listed below:      THERAPEUTIC EXERCISES to develop strength, endurance, ROM and flexibility for 56 minutes including :  - Aerobic activity and endurance training for reciprocal motion of lower limbs on Nustep for 10 minutes at Level 3.5 at > or equal to 50 spm w/o rest to increase mobility, blood flow and improve tissue tolerance.     Parallel bars:   - 6-inch taps; 2 mins   - 6 -inch step ups; x 2 mins for 2 trials with each leg leading   - 4-inch walk overs; 2 x 10 reps, max verbal cueing   - Standing " calf raises; 3 x 10 reps   - Lateral walks 6 laps with RTB at ankles; max cueing to decrease hip ER and dragging of feet   -SLS 3 x 20' hold with finger tip support on parallel bars      - Sit to stands with Green theraband; (Right lower extremity back, Left lower extremity in front to promote WB through right lower extremity); 3 x 10 reps, required no assist from PT    GAIT TRAINING to improve functional mobility and safety for 00  minutes.  - with no AD with CGA from PT and Vcs for proper foot placement  - Figure 8 gait with cones  -Step overs with small hurdles     Patient Education and Home Exercises     Home Exercises Provided and Patient Education Provided     Education provided:   - Issued an HEP this visit to help improve LE strength and motor control outside of PT. See media (8/29/2022).    Written Home Exercises Provided: yes, see media (8/29/2022). Exercises were reviewed and Diego was able to demonstrate them prior to the end of the session.  Diego demonstrated good  understanding of the education provided. See EMR under Patient Instructions for exercises provided during therapy sessions    ASSESSMENT   Patient tolerated session well today.  He remains with difficulty with dynamic control in R LE and therefore has minor LOB during challenging gait training activities.  He is able to recover with CGA from PT.  Will continue to progress and challenge his dynamic standing balance tolerance.    Diego Is progressing well towards his goals.   Pt prognosis is Excellent.     Pt will continue to benefit from skilled outpatient physical therapy to address the deficits listed in the problem list box on initial evaluation, provide pt/family education and to maximize pt's level of independence in the home and community environment.     Pt's spiritual, cultural and educational needs considered and pt agreeable to plan of care and goals.     Anticipated barriers to physical therapy: none to note     GOALS:   Short  Term Goals (6 Weeks):   1.  Independent with initial HEP. - Appropriate, ongoing   2.  Pt will perform nu-step at level 2.0 x 10 minutes without rests breaks going at least 10 step/min or greater. - MET (8/29/2022)  3.  Increase strength by 1/3 MMT grade in right quadruceps  to increase tolerance for ADL and work activities. - Appropriate, ongoing   4.  Patient will improve impaired lower extremity manual muscle tests to >/= 3+/5 to improve dynamic right hip/knee support for functional tasks. - Appropriate, ongoing      Long Term Goals (12 Weeks):   1. Independent with updated HEP. - Appropriate, ongoing   2. Pt will perform nu-step at level 4.0 x 15 minutes without rests breaks going at least 12 step/min or greater. - Appropriate, ongoing   3. Pt will be able to perform TUG in < 10 secs without use of AD placingdemonstrating overall improved functional mobility. - Appropriate, ongoing   4. Pt will performed 30 sec sit to  15 reps without UE support without LOB backward or posterior LE hooking for functional and safe transfers. - Appropriate, ongoing   5. Patient will improve impaired lower extremity manual muscle tests to >/= 4/5 to improve dynamic right hip/knee support for functional tasks. - Appropriate, ongoing   6. Pt will be able to safely perform and tolerate high level ADL's without LOB. - Appropriate, ongoing   7. Pt will have 0 falls from start of PT sessions. - Appropriate, ongoing   8. Pt will ambulate on TM x 10 minutes without use of UE support with 0 LOB at 2.2 mph. - Appropriate, ongoing   9. Pt will perform floor to stand f/b stand to floor transfer with UE support with stand by assist and no cues for improved dynamic transfer, core stability and fall safety in home and community. - Appropriate, ongoing   10. Pt will begin some form of community fitness to begin regular and consistent performance of exercise to continue maintenance of gains made in PT. - Appropriate, ongoing     PLAN      Emphasize lower extremity strength and improve gait pattern.     Cary Navarro, PT

## 2022-09-06 NOTE — PROGRESS NOTES
Occupational Therapy Daily Treatment Note   Date 9/6/2022    Name: Diego TIRADO [facility] Number: [id number]    Therapy Diagnosis:  Right UE weakness , sp laminectomy and stroke   Encounter Diagnosis   Name Primary?    Functional weakness Yes         Physician: Carlos Miller MD       Physician Orders: Z98.890 (ICD-10-CM) - Status post laminectomy   eval and treat   Medical Diagnosis: Z98.890 (ICD-10-CM) - Status post laminectomy      Surgical Procedure/ Date :cervical laminectomy 4/28/22 and spinal cord bleed   Mechanism of Injury: Diego reports: Pt with pain beginning at 2:30 am on 4/28/2022; decorticate posturing up upper extremity and unable to move bilateral lower extremities. Pt was rushing to the ER; Pt's wife states MD states he had bleeding from his head into his spine. 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   Pt stayed in hospital for 16 days then was transferred to [facility] for rehabilitation for 2 months (OT, PT, speech therapy). Pt was discharged and received OT/PT Home Health for 1 month. Pt biggest complaints involves hand movements, elbow extension, AROM of shoulder and ambulation. Pt only with complaints of pain from shoulder to hand (7/10) - describes as burning pain. Pt wife states pt struggling with short term memory and speech due to dysphagia after surgery.   Evaluation Date: 8/11/2022  Insurance:[employer]   Insurance Authorization period Expiration: 9/8/22   Plan of Care Certification Period: 10/31/22   -      Visit # / Visits Authortized: 8 / 11  Time In: 10:56 am   Time Out: 11:55 am   Total Billable Time: 59  minutes     Precautions: Diabetes    Subjective       Pt reports: pins and needles in his IF and LF waking him up at night, and shoulder pain over the weekend     he was compliant with home exercise program given last session.  Response to previous treatment:increased use of right hand   Functional change: none  noted yet     Pain: 0/10  Location: right UE     Objective       Diego received the following supervised modalities after being cleared for contradictions for 10 minutes:   -moist heat right shoulder  Fluidotherapy: To R hand for 10 min, continuous air, 110 deg, air speed 100 to decrease pain, edema & scar tissue and increased tissue extensibility.       Diego received  Manual  Therapy   for 15  minutes including: in supine   - STM and retrograde massage right hand   - scap mobs and PROM shoulder flex/Abd/ext  seated     NT- PROM shoulder all ways supine  and PROM elbow and wrist  flexion/ extension,  X 10 reps   And in sidelying shoulder flexion, scaption with manual  scapula support      There ex X 34 minutes    UBE L1 FW/BW x3 min ea way  Weightbearing with BAPS board / marbles 2 minutes   Punches 1#   Fx reach with cone stacking  *issued L compression glove     (NT)  supine Dowel flexion/scaption/chest press no weight x20 ea  ER/IR supine x10  tricep ext supine x10(NT)  Hand gripper 3 YRB X 2 minutes   Keyhole pin  manipulation 1 sets (NT)  Coins 1 set (NT)   Elbow flexion 2# flex/ ext (NT)  Green flex bar sup/pro 20 reps    standing weight well 1# ( pt standing with support on wall .  1 min (NT)  Shoulder shrugs and squeezes  30 reps (NT)  WB'ing Towel slides in standing  in flexion x20 (NT)        Yellow t band shoulder rows and  And shoulder extension 30 reps ( at home ) (NT)      Home Exercises and Education Provided     Education provided:   - continue with HEP   - Progress towards goals     Written Home Exercises Provided: Patient instructed to cont prior HEP.    Exercises were reviewed and Diego was able to demonstrate them prior to the end of the session.  Diego demonstrated good  understanding of the education provided.   .   See EMR under Patient Instructions for exercises provided prior visit.       Assessment     Pt would continue to benefit from skilled OT.  Yes    Significantly incr edema in R  hand noted today upon arrival. Tolerated shoulder AROM today despite some discomfort. Reported pins and needles, tingling along median nerve distribution in hand at night, neg for tinel's sign. Will progress WB'ing as tolerated as tolerated for neuro muscular retraining and scapula support for ROM     Diego is progressing well towards his goals and there are no updates to goals at this time. Pt prognosis is Good.     Pt will continue to benefit from skilled outpatient occupational therapy to address the deficits listed in the problem list on initial evaluation provide pt/family education and to maximize pt's level of independence in the home and community environment.     Anticipated barriers to occupational therapy:  None     Pt's spiritual, cultural and educational needs considered and pt agreeable to plan of care and goals.      GOALS: 12 weeks. Pt agrees with goals set.     Short Term (6 weeks on 9/30/22 ) .     1)   Patient to be IND with HEP and modalities for pain management, progressing   2)   Increase ROM elbow extension and shoulder flexion, ex , scaption 10-15 degrees  For  Right UE  motion to increase functional hand use for right hand , progressing   3)   Increase  strength by  10 lbs. to grasp right hand , progressing   4)   Increase pinch 1-3 psis for  5, progressing   5)   Decrease edema .1-2cm to increase joint mobility /flexibility for improved overall functional hand use. , progressing   6)   Increase pinch 1-3 psi's to increase IND with button and FM Coordination.     Long Term (by discharge):  1)   Pt will report 0 out of 10 pain with right UE ., progressing   2)   Patient to score of 30% on FOTO to demonstrate improved perception of functional right hand/ arm  Use. Progressing   3)   Pt will return to prior level of function for ADLs and household management, progressing         Plan   Certification Period/Plan of care expiration: 8/11/2022 to  10/31/22 .     Discussed Plan of Care with  patient: Yes  Updates/Grading for next session:  Yes       JAMIE Torres/KVNG

## 2022-09-07 NOTE — PROGRESS NOTES
"  [facility]   Physical Therapy Treatment Note     Name: Diego TIRADO [facility]  Clinic Number: [id number]    Therapy Diagnosis:   Encounter Diagnosis   Name Primary?    Functional weakness Yes     Physician: Carlos Miller MD    Visit Date: 9/8/2022    Physician Orders: PT Eval and Treat   Medical Diagnosis from Referral: Z98.890 (ICD-10-CM) - Status post laminectomy  Evaluation Date: 7/27/2022  Authorization Period Expiration: 09/25/2022  Plan of Care Expiration: 10/24/2022  Progress Note Due: 12th visit   Visit # / Visits authorized: 12 / 17 + eval    PTA Visit #: 1 / 5      Time In: 1000  Time Out: 1055  Total Appointment Time (timed & untimed codes): 55 minutes (4 TA)     Precautions: Standard; Floyd Catheter since April 28th for urinary retention (removed on 08/05/2022)     SUBJECTIVE     Patient reports: no new complaints today.  He was not compliant with home exercise program secondary to not receiving one.  Response to previous treatment: good; appropriate muscle response  Functional change: ambulated into clinic with RW; states trying to use his cane but he cannot get the coordination    Pain: 0/10, currently  Location: Right side weakness     Patients goals: "return to independence and be able to go back to fitness center without assistance"    OBJECTIVE     Objective Measures updated at progress report unless specified.     Treatment     Diego received the treatments listed below:      THERAPEUTIC ACTIVITIES to develop strength, endurance, ROM and flexibility for 55 minutes including :  - Aerobic activity and endurance training for reciprocal motion of lower limbs on Nustep for 10 minutes at Level 3.5 at > or equal to 50 spm w/o rest to increase mobility, blood flow and improve tissue tolerance.     Parallel bars:   - 6 - inch taps; 2 minutes for 2 trials  - 6 - inch step ups; 2 minutes for 2 trials with each leg leading   - 4 - inch walk overs; 2 x 10 reps, max verbal cueing   - " Standing calf raises; 3 x 15 reps   - Lateral walks 5 laps with RTB at ankles; max cueing to decrease hip ER and dragging of feet   - SLS 3 x 20 second hold with 2-finger support on parallel bars with SBA from therapist    - Sit to stands with Green theraband; 2 x 15 reps with SBA from therapist    Patient Education and Home Exercises     Home Exercises Provided and Patient Education Provided     Education provided:   - Issued an HEP this visit to help improve LE strength and motor control outside of PT. See media (8/29/2022).  - Discussed continuing use of walker at home and in public secondary to patient fear of falling and not feeling stable with use of cane. Patient verbalized understanding.    Written Home Exercises Provided: yes, see media (8/29/2022). Exercises were reviewed and Diego was able to demonstrate them prior to the end of the session.  Diego demonstrated good  understanding of the education provided. See EMR under Patient Instructions for exercises provided during therapy sessions    ASSESSMENT     Patient tolerated exercises fairly well. He requires mod cueing for proper technique throughout session and visual demonstration of some exercises. Patient continues to demonstrate increased difficulty with DF of Right foot to clear a step. Mod cueing required with lateral walks to avoid ER of Right foot due to decreased strength and motor control. Discussed with patient continuing use of walker at home and in public secondary to patient fear of falling and not feeling stable with use of cane. Patient verbalized understanding. Consider addition of gait training exercises in future sessions.  Diego Is progressing well towards his goals.   Pt prognosis is Excellent.     Pt will continue to benefit from skilled outpatient physical therapy to address the deficits listed in the problem list box on initial evaluation, provide pt/family education and to maximize pt's level of independence in the home and  community environment.     Pt's spiritual, cultural and educational needs considered and pt agreeable to plan of care and goals.     Anticipated barriers to physical therapy: none to note     GOALS:   Short Term Goals (6 Weeks):   1.  Independent with initial HEP. - Appropriate, ongoing   2.  Pt will perform nu-step at level 2.0 x 10 minutes without rests breaks going at least 10 step/min or greater. - MET (8/29/2022)  3.  Increase strength by 1/3 MMT grade in right quadruceps  to increase tolerance for ADL and work activities. - Appropriate, ongoing   4.  Patient will improve impaired lower extremity manual muscle tests to >/= 3+/5 to improve dynamic right hip/knee support for functional tasks. - Appropriate, ongoing      Long Term Goals (12 Weeks):   1. Independent with updated HEP. - Appropriate, ongoing   2. Pt will perform nu-step at level 4.0 x 15 minutes without rests breaks going at least 12 step/min or greater. - Appropriate, ongoing   3. Pt will be able to perform TUG in < 10 secs without use of AD placingdemonstrating overall improved functional mobility. - Appropriate, ongoing   4. Pt will performed 30 sec sit to  15 reps without UE support without LOB backward or posterior LE hooking for functional and safe transfers. - Appropriate, ongoing   5. Patient will improve impaired lower extremity manual muscle tests to >/= 4/5 to improve dynamic right hip/knee support for functional tasks. - Appropriate, ongoing   6. Pt will be able to safely perform and tolerate high level ADL's without LOB. - Appropriate, ongoing   7. Pt will have 0 falls from start of PT sessions. - Appropriate, ongoing   8. Pt will ambulate on TM x 10 minutes without use of UE support with 0 LOB at 2.2 mph. - Appropriate, ongoing   9. Pt will perform floor to stand f/b stand to floor transfer with UE support with stand by assist and no cues for improved dynamic transfer, core stability and fall safety in home and community. -  Appropriate, ongoing   10. Pt will begin some form of community fitness to begin regular and consistent performance of exercise to continue maintenance of gains made in PT. - Appropriate, ongoing     PLAN     Emphasize lower extremity strength and improve gait pattern.     Tracey Ruiz, PTA

## 2022-09-08 ENCOUNTER — OFFICE VISIT (OUTPATIENT)
Dept: GASTROENTEROLOGY | Facility: CLINIC | Age: 82
End: 2022-09-08
Payer: MEDICARE

## 2022-09-08 ENCOUNTER — CLINICAL SUPPORT (OUTPATIENT)
Dept: REHABILITATION | Facility: HOSPITAL | Age: 82
End: 2022-09-08
Payer: MEDICARE

## 2022-09-08 VITALS
BODY MASS INDEX: 24.14 KG/M2 | HEIGHT: 69 IN | HEART RATE: 55 BPM | SYSTOLIC BLOOD PRESSURE: 136 MMHG | WEIGHT: 163 LBS | DIASTOLIC BLOOD PRESSURE: 77 MMHG

## 2022-09-08 DIAGNOSIS — R53.1 FUNCTIONAL WEAKNESS: Primary | ICD-10-CM

## 2022-09-08 DIAGNOSIS — D68.61 ANTIPHOSPHOLIPID SYNDROME: ICD-10-CM

## 2022-09-08 DIAGNOSIS — K59.00 CONSTIPATION, UNSPECIFIED CONSTIPATION TYPE: Primary | ICD-10-CM

## 2022-09-08 PROCEDURE — 3288F PR FALLS RISK ASSESSMENT DOCUMENTED: ICD-10-PCS | Mod: CPTII,S$GLB,, | Performed by: INTERNAL MEDICINE

## 2022-09-08 PROCEDURE — 1101F PT FALLS ASSESS-DOCD LE1/YR: CPT | Mod: CPTII,S$GLB,, | Performed by: INTERNAL MEDICINE

## 2022-09-08 PROCEDURE — 3075F SYST BP GE 130 - 139MM HG: CPT | Mod: CPTII,S$GLB,, | Performed by: INTERNAL MEDICINE

## 2022-09-08 PROCEDURE — 3078F PR MOST RECENT DIASTOLIC BLOOD PRESSURE < 80 MM HG: ICD-10-PCS | Mod: CPTII,S$GLB,, | Performed by: INTERNAL MEDICINE

## 2022-09-08 PROCEDURE — 1159F MED LIST DOCD IN RCRD: CPT | Mod: CPTII,S$GLB,, | Performed by: INTERNAL MEDICINE

## 2022-09-08 PROCEDURE — 3078F DIAST BP <80 MM HG: CPT | Mod: CPTII,S$GLB,, | Performed by: INTERNAL MEDICINE

## 2022-09-08 PROCEDURE — 99499 UNLISTED E&M SERVICE: CPT | Mod: S$GLB,,, | Performed by: INTERNAL MEDICINE

## 2022-09-08 PROCEDURE — 1126F AMNT PAIN NOTED NONE PRSNT: CPT | Mod: CPTII,S$GLB,, | Performed by: INTERNAL MEDICINE

## 2022-09-08 PROCEDURE — 99204 PR OFFICE/OUTPT VISIT, NEW, LEVL IV, 45-59 MIN: ICD-10-PCS | Mod: S$GLB,,, | Performed by: INTERNAL MEDICINE

## 2022-09-08 PROCEDURE — 1101F PR PT FALLS ASSESS DOC 0-1 FALLS W/OUT INJ PAST YR: ICD-10-PCS | Mod: CPTII,S$GLB,, | Performed by: INTERNAL MEDICINE

## 2022-09-08 PROCEDURE — 1159F PR MEDICATION LIST DOCUMENTED IN MEDICAL RECORD: ICD-10-PCS | Mod: CPTII,S$GLB,, | Performed by: INTERNAL MEDICINE

## 2022-09-08 PROCEDURE — 99999 PR PBB SHADOW E&M-EST. PATIENT-LVL IV: CPT | Mod: PBBFAC,,, | Performed by: INTERNAL MEDICINE

## 2022-09-08 PROCEDURE — 1157F ADVNC CARE PLAN IN RCRD: CPT | Mod: CPTII,S$GLB,, | Performed by: INTERNAL MEDICINE

## 2022-09-08 PROCEDURE — 99204 OFFICE O/P NEW MOD 45 MIN: CPT | Mod: S$GLB,,, | Performed by: INTERNAL MEDICINE

## 2022-09-08 PROCEDURE — 1157F PR ADVANCE CARE PLAN OR EQUIV PRESENT IN MEDICAL RECORD: ICD-10-PCS | Mod: CPTII,S$GLB,, | Performed by: INTERNAL MEDICINE

## 2022-09-08 PROCEDURE — 99499 RISK ADDL DX/OHS AUDIT: ICD-10-PCS | Mod: S$GLB,,, | Performed by: INTERNAL MEDICINE

## 2022-09-08 PROCEDURE — 97140 MANUAL THERAPY 1/> REGIONS: CPT

## 2022-09-08 PROCEDURE — 3075F PR MOST RECENT SYSTOLIC BLOOD PRESS GE 130-139MM HG: ICD-10-PCS | Mod: CPTII,S$GLB,, | Performed by: INTERNAL MEDICINE

## 2022-09-08 PROCEDURE — 97110 THERAPEUTIC EXERCISES: CPT

## 2022-09-08 PROCEDURE — 99999 PR PBB SHADOW E&M-EST. PATIENT-LVL IV: ICD-10-PCS | Mod: PBBFAC,,, | Performed by: INTERNAL MEDICINE

## 2022-09-08 PROCEDURE — 97530 THERAPEUTIC ACTIVITIES: CPT | Mod: CQ

## 2022-09-08 PROCEDURE — 3288F FALL RISK ASSESSMENT DOCD: CPT | Mod: CPTII,S$GLB,, | Performed by: INTERNAL MEDICINE

## 2022-09-08 PROCEDURE — 1126F PR PAIN SEVERITY QUANTIFIED, NO PAIN PRESENT: ICD-10-PCS | Mod: CPTII,S$GLB,, | Performed by: INTERNAL MEDICINE

## 2022-09-08 NOTE — PROGRESS NOTES
Occupational Therapy Daily Treatment Note   Date 9/8/2022    Name: Diego TIRADO [facility] Number: [id number]      Therapy Diagnosis:  Right UE weakness , sp laminectomy and stroke   Encounter Diagnosis   Name Primary?    Functional weakness Yes       Physician: Carlos Miller MD       Physician Orders: Z98.890 (ICD-10-CM) - Status post laminectomy   eval and treat   Medical Diagnosis: Z98.890 (ICD-10-CM) - Status post laminectomy      Surgical Procedure/ Date :cervical laminectomy 4/28/22 and spinal cord bleed   Mechanism of Injury: Diego reports: Pt with pain beginning at 2:30 am on 4/28/2022; decorticate posturing up upper extremity and unable to move bilateral lower extremities. Pt was rushing to the ER; Pt's wife states MD states he had bleeding from his head into his spine. 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   Pt stayed in hospital for 16 days then was transferred to [facility] for rehabilitation for 2 months (OT, PT, speech therapy). Pt was discharged and received OT/PT Home Health for 1 month. Pt biggest complaints involves hand movements, elbow extension, AROM of shoulder and ambulation. Pt only with complaints of pain from shoulder to hand (7/10) - describes as burning pain. Pt wife states pt struggling with short term memory and speech due to dysphagia after surgery.   Evaluation Date: 8/11/2022  Insurance:[employer]   Insurance Authorization period Expiration: 9/8/22   Plan of Care Certification Period: 10/31/22   -      Visit # / Visits Authortized: 9/ 11  Time In: 11:00 am   Time Out: 12:00 pm   Total Billable Time:   60  minutes     Precautions: Diabetes    Subjective       Pt reports: pins and needles in his IF and LF waking him up at night, and shoulder pain over the weekend     he was compliant with home exercise program given last session.  Response to previous treatment:increased use of right hand   Functional change:  "none noted yet     Pain: 0/10  Location: right UE     Objective       Diego received the following supervised modalities after being cleared for contradictions for 10 minutes:   -moist heat right shoulder  Fluidotherapy: To R hand for 10 min, continuous air, 110 deg, air speed 100 to decrease pain, edema & scar tissue and increased tissue extensibility.       Diego received  Manual  Therapy   for 15  minutes including: in supine   - STM and retrograde massage right hand   - scap mobs and PROM shoulder flex/Abd/ext  seated     NT- PROM shoulder all ways supine  and PROM elbow and wrist  flexion/ extension,  X 10 reps   And in sidelying shoulder flexion, scaption with manual  scapula support      There ex X 34 minutes    UBE L1 FW/BW x3 min ea way  Weightbearing with BAPS board / marbles 2 minutes   Yellow  web push  with palm flat / pulls 30 reps   Prone on mat scapula " T " and " I " 30 reps    Prone on mat rows  2#  30 reps   Elbow flexion 2# flex/ ext  30 reps   Green flex bar sup/pro 20 reps    WB'ing Towel slides  with 3# in standing  in flexion x20         Yellow t band shoulder rows and  And shoulder extension 30 reps ( at home )       Home Exercises and Education Provided     Education provided:   - continue with HEP   - Progress towards goals     Written Home Exercises Provided: Patient instructed to cont prior HEP.    Exercises were reviewed and Diego was able to demonstrate them prior to the end of the session.  Diego demonstrated good  understanding of the education provided.   .   See EMR under Patient Instructions for exercises provided prior visit.       Assessment     Pt would continue to benefit from skilled OT.  Yes    Significantly incr edema in R hand noted today upon arrival. Tolerated shoulder AROM today despite some discomfort. Reported pins and needles, tingling along median nerve distribution in hand at night, neg for tinel's sign. Will progress WB'ing as tolerated as tolerated for neuro " muscular retraining and scapula support for ROM   Pt tolerated prone scapula ex well on this date   Diego is progressing well towards his goals and there are no updates to goals at this time. Pt prognosis is Good.     Pt will continue to benefit from skilled outpatient occupational therapy to address the deficits listed in the problem list on initial evaluation provide pt/family education and to maximize pt's level of independence in the home and community environment.     Anticipated barriers to occupational therapy:  None     Pt's spiritual, cultural and educational needs considered and pt agreeable to plan of care and goals.      GOALS: 12 weeks. Pt agrees with goals set.     Short Term (6 weeks on 9/30/22 ) .     1)   Patient to be IND with HEP and modalities for pain management, progressing   2)   Increase ROM elbow extension and shoulder flexion, ex , scaption 10-15 degrees  For  Right UE  motion to increase functional hand use for right hand , progressing   3)   Increase  strength by  10 lbs. to grasp right hand , progressing   4)   Increase pinch 1-3 psis for  5, progressing   5)   Decrease edema .1-2cm to increase joint mobility /flexibility for improved overall functional hand use. , progressing   6)   Increase pinch 1-3 psi's to increase IND with button and FM Coordination.     Long Term (by discharge):  1)   Pt will report 0 out of 10 pain with right UE ., progressing   2)   Patient to score of 30% on FOTO to demonstrate improved perception of functional right hand/ arm  Use. Progressing   3)   Pt will return to prior level of function for ADLs and household management, progressing         Plan   Certification Period/Plan of care expiration: 8/11/2022 to  10/31/22 .     Discussed Plan of Care with patient: Yes  Updates/Grading for next session:  Yes       Smitha Quick, OT

## 2022-09-08 NOTE — PROGRESS NOTES
"[facility] Consultation     Reason for Consult:  The primary encounter diagnosis was Constipation, unspecified constipation type. A diagnosis of Antiphospholipid syndrome was also pertinent to this visit.    PCP:   Portia Ferreira   No address on file    Referring MD:  Aaareferral Self  No address on file    HPI:  This is a 82 y.o. male with antiphospholipid syndrome now off of a coumadin, DM2, recent fall undergoing PT, spinal surgery c/b hematoma requiring evacuation.  He presents with his grandson for constipation management.    Reports change in bowel movements one month prior without any preceding events or changes. Denies recent surgery, change in medication, or life stressors.  Has bowel movements every 4-5 days with use of enema. Stools are hard, "rocks" and requires straining. No associated blood or mucus.  Has associated right groin pain, intermittent, no clear relieving or exacerbating factors. Denies associated fevers, nausea, vomiting, weight loss.  Denies family history of GI malignancy.    Last colonoscopy 2020 with 4 mm polyp, internal hemorrhoids and diverticulosis.  Denies tobacco use.  Denies alcohol use.  Denies drug use.  No previous abdominal surgeries.        ROS:  Constitutional: No fevers, chills, No weight loss  ENT: No allergies  CV: No chest pain  Pulm: No cough, No shortness of breath  Ophtho: No vision changes  GI: see HPI  Derm: No rash  Heme: No lymphadenopathy, No bruising  MSK: No arthritis  : No dysuria, No hematuria  Endo: No hot or cold intolerance  Neuro: No syncope, No seizure  Psych: No anxiety, No depression    Medical History:  has a past medical history of Antiphospholipid syndrome (11/19/2021), Dysphagia, colonic polyps, Hyperlipidemia LDL goal <100, Overweight(278.02), Prostate cancer (september 2011), and Type II or unspecified type diabetes mellitus without mention of complication, not stated as uncontrolled.    Surgical History:  has a past " "surgical history that includes Prostatectomy; Cataract extraction, bilateral (2014); Bronchoscopy (N/A, 10/15/2018); and Posterior cervical laminectomy (Bilateral, 4/28/2022).    Family History: family history includes Colon cancer in his mother; Dementia in his brother; Diabetes in his brother, brother, maternal aunt, mother, sister, and another family member; Heart disease in his father; Lung cancer in his brother; Mental illness in his sister; Myasthenia gravis in his brother; Other in his brother; Parkinsonism in his brother; Pulmonary fibrosis in his brother..   No contributory family history     Social History:  reports that he quit smoking about 59 years ago. He has a 1.25 pack-year smoking history. He has quit using smokeless tobacco. He reports that he does not currently use alcohol. He reports that he does not use drugs.    Review of patient's allergies indicates:  No Known Allergies    Current Outpatient Rx   Medication Sig Dispense Refill    b complex vitamins capsule Take 1 capsule by mouth once daily.      ethambutoL (MYAMBUTOL) 400 MG Tab Take 2 tablets (800 mg total) by mouth once daily. 60 tablet 5    omeprazole (PRILOSEC) 10 MG capsule ONE CAPSULE BY MOUTH once a day as needed      pravastatin (PRAVACHOL) 10 MG tablet Take 1 tablet (10 mg total) by mouth once daily. 90 tablet 0    sodium chloride 3% 3 % nebulizer solution USE 4 ML VIAL IN NEBULIZER  TWICE DAILY 480 mL 11    azithromycin (ZITHROMAX) 500 MG tablet Take 1 tablet (500 mg total) by mouth once daily. (Patient not taking: Reported on 9/8/2022) 30 tablet 5    nebulizer and compressor ([employer] COMPRESSOR SYSTEM) Marcy use to administer nebulized medication as directed (Patient not taking: Reported on 9/8/2022) 1 each 0    sodium chloride 3% 3 % nebulizer solution SMARTSIG:Via Inhaler         Objective Findings:    Vital Signs:  /77   Pulse (!) 55   Ht 5' 9" (1.753 m)   Wt 73.9 kg (163 lb 0.5 oz)   BMI 24.08 kg/m²   Body mass index is " 24.08 kg/m².    Physical Exam:  General Appearance: well-appearing, NAD  Head:   Normocephalic, without obvious abnormality  Eyes:    No scleral icterus, EOMI  ENT: Neck supple; moist mucus membranes  Lungs: clear to auscultation bilaterally.  Heart:  regular rate and rhythm, S1, S2 normal, no murmurs heard  Abdomen: soft, mild tenderness to deep palpation right groin, no palpable masses.  Extremities: 2+ pulses, no clubbing, cyanosis or edema  Skin: No visible rash  Neurologic: no focal motor deficits      Labs:  Lab Results   Component Value Date    WBC 6.09 07/13/2022    HGB 12.6 (L) 07/13/2022    HCT 39.6 (L) 07/13/2022     07/13/2022    CHOL 116 (L) 04/28/2022    TRIG 88 04/28/2022    HDL 36 (L) 04/28/2022    ALT 66 (H) 05/13/2022    AST 32 05/13/2022     05/13/2022     05/13/2022    K 4.3 05/13/2022     05/13/2022    CREATININE 0.8 06/08/2022    BUN 15 05/13/2022    CO2 22 (L) 05/13/2022    TSH 3.649 04/28/2022    PSA 0.02 10/28/2015    INR 2.0 (H) 07/13/2022    HGBA1C 5.7 (H) 04/28/2022       IMAGING:  CT 5/2022  1. Massively dilated urinary bladder measuring 24 cm in length and resulting in moderate hydroureteronephrosis bilaterally.  No evidence of obstructive stone.  Correlate for bladder outlet obstruction.  2. Stable degenerative changes of the osseous structures.  3. Additional findings as above.  This report was flagged in Epic as abnormal.       PROCEDURES:  Colonoscopy 2020 (outside) internal hemorrhoids, diverticulosis left colon, 4 mm polyp.    EGD 2016      The examined duodenum was normal.   Impression:          - Normal esophagus.                        - Gastritis. Biopsied.                        - Normal examined duodenum.                        - Multiple biopsies were obtained in the middle                        third of the esophagus    1. GASTRIC BIOPSY, ANTRUM-CHRONIC INFLAMMATION. INTESTINAL METAPLASIA. A SPECIAL STAIN FOR HELICOBACTER PYLORI-LIKE IS BEING  OBTAINED AND A SUPPLEMENTAL REPORT WILL FOLLOW 2. ESOPHAGEAL BIOPSY, MID ESOPHAGUS-NO SIGNIFICANT HISTOPATHOLOGIC ABNORMALITY    Assessment/Plan:  This is a 82 y.o. male with antiphospholipid syndrome now off of a coumadin, DM2, recent fall undergoing PT, spinal surgery c/b hematoma requiring evacuation.  He presents with his grandson for constipation management.    CHANGE IN BOWEL MOVEMENTS  CONSTIPATION  RIGHT GROIN PAIN  Denies change in medications, recent surgery or opiate use. TSH within normal on last check.  Last colonoscopy 2020 with diverticulosis, 4 mm polyp, internal hemorrhoids  Drinks adequate water, has recently started fiber supplementation and takes enemas as needed  Advised him to take daily metamucil and add miralax TID  Will obtain CT abdomen / pelvis given associated right groin pain, ?hernia  Given change in bowel movements, would also recommend colonoscopy for further evaluation - patient will have CT scan prior to scheduling colonoscopy         RTC November       Order summary:  Orders Placed This Encounter    CT Abdomen Pelvis With Contrast    CREATININE, SERUM    Protime-INR    Ambulatory referral/consult to Endo Procedure          Thank you so much for allowing me to participate in the care of Diego Whitt MD  Gastroenterology   [facility]

## 2022-09-09 ENCOUNTER — LAB VISIT (OUTPATIENT)
Dept: LAB | Facility: HOSPITAL | Age: 82
End: 2022-09-09
Attending: INTERNAL MEDICINE
Payer: MEDICARE

## 2022-09-09 DIAGNOSIS — D68.61 ANTIPHOSPHOLIPID SYNDROME: ICD-10-CM

## 2022-09-09 DIAGNOSIS — K59.00 CONSTIPATION, UNSPECIFIED CONSTIPATION TYPE: ICD-10-CM

## 2022-09-09 LAB
CREAT SERPL-MCNC: 1.1 MG/DL (ref 0.5–1.4)
EST. GFR  (NO RACE VARIABLE): >60 ML/MIN/1.73 M^2
INR PPP: 1.1 (ref 0.8–1.2)
PROTHROMBIN TIME: 11.4 SEC (ref 9–12.5)

## 2022-09-09 PROCEDURE — 36415 COLL VENOUS BLD VENIPUNCTURE: CPT | Mod: PO | Performed by: INTERNAL MEDICINE

## 2022-09-09 PROCEDURE — 85610 PROTHROMBIN TIME: CPT | Performed by: INTERNAL MEDICINE

## 2022-09-09 PROCEDURE — 82565 ASSAY OF CREATININE: CPT | Performed by: INTERNAL MEDICINE

## 2022-09-12 ENCOUNTER — CLINICAL SUPPORT (OUTPATIENT)
Dept: REHABILITATION | Facility: HOSPITAL | Age: 82
End: 2022-09-12
Payer: MEDICARE

## 2022-09-12 DIAGNOSIS — R29.898 UPPER EXTREMITY WEAKNESS: Primary | ICD-10-CM

## 2022-09-12 DIAGNOSIS — R53.1 FUNCTIONAL WEAKNESS: Primary | ICD-10-CM

## 2022-09-12 PROCEDURE — 97022 WHIRLPOOL THERAPY: CPT

## 2022-09-12 PROCEDURE — 97110 THERAPEUTIC EXERCISES: CPT

## 2022-09-12 NOTE — PROGRESS NOTES
"  [facility]   Physical Therapy Treatment Note     Name: Diego TIRADO [facility]  Clinic Number: [id number]    Therapy Diagnosis:   Encounter Diagnosis   Name Primary?    Functional weakness Yes     Physician: Carlos Miller MD    Visit Date: 9/12/2022    Physician Orders: PT Eval and Treat   Medical Diagnosis from Referral: Z98.890 (ICD-10-CM) - Status post laminectomy  Evaluation Date: 7/27/2022  Authorization Period Expiration: 09/25/2022  Plan of Care Expiration: 10/24/2022  Progress Note Due: 12th visit   Visit # / Visits authorized: 13 / 17 + eval    PTA Visit #: 0 / 5      Time In: 1000  Time Out: 1055  Total Appointment Time (timed & untimed codes): 55 minutes (4 TA)     Precautions: Standard; Floyd Catheter since April 28th for urinary retention (removed on 08/05/2022)     SUBJECTIVE     Patient reports: no new complaints today.  He was not compliant with home exercise program secondary to not receiving one.  Response to previous treatment: good; appropriate muscle response  Functional change: ambulated into clinic with RW; states trying to use his cane but he cannot get the coordination    Pain: 0/10, currently  Location: Right side weakness     Patients goals: "return to independence and be able to go back to fitness center without assistance"    OBJECTIVE     Objective Measures updated at progress report unless specified.     Treatment     Diego received the treatments listed below:      THERAPEUTIC ACTIVITIES to develop strength, endurance, ROM and flexibility for 55 minutes including :  - Aerobic activity and endurance training for reciprocal motion of lower limbs on Nustep for 10 minutes at Level 3.5 at > or equal to 50 spm w/o rest to increase mobility, blood flow and improve tissue tolerance.     Parallel bars:   - 6 - inch toe taps; 2 minutes for 2 trials  - 6 - inch step ups; 2 minutes for 2 trials with each leg leading   - Standing calf raises; 3 x 15 reps   - Lateral walks " 5 laps with RTB at ankles; max cueing to decrease hip ER and dragging of feet   - SLS 3 x 20 second hold with 2-finger support on parallel bars with SBA from therapist  -Seated Hip Flexion with RTB with 3 sec hold x 30 reps each LE   - Sit to stands with Green theraband; 2 x 15 reps with SBA from therapist    Gait Training: CGA with no AD for 3 laps around clinic  Patient Education and Home Exercises     Home Exercises Provided and Patient Education Provided     Education provided:   - Issued an HEP this visit to help improve LE strength and motor control outside of PT. See media (8/29/2022).  - Discussed continuing use of walker at home and in public secondary to patient fear of falling and not feeling stable with use of cane. Patient verbalized understanding.    Written Home Exercises Provided: yes, see media (8/29/2022). Exercises were reviewed and Diego was able to demonstrate them prior to the end of the session.  Diego demonstrated good  understanding of the education provided. See EMR under Patient Instructions for exercises provided during therapy sessions    ASSESSMENT     Patient tolerated exercises fairly well. He requires mod cueing for proper technique throughout session and visual demonstration of some exercises. Patient continues to demonstrate increased difficulty with DF of Right foot to clear a step. Mod cueing required with lateral walks to avoid ER of Right foot due to decreased strength and motor control. Discussed with patient continuing use of walker at home and in public secondary to patient fear of falling and not feeling stable with use of cane. Patient verbalized understanding. Consider addition of gait training exercises in future sessions.  Diego Is progressing well towards his goals.   Pt prognosis is Excellent.     Pt will continue to benefit from skilled outpatient physical therapy to address the deficits listed in the problem list box on initial evaluation, provide pt/family  education and to maximize pt's level of independence in the home and community environment.     Pt's spiritual, cultural and educational needs considered and pt agreeable to plan of care and goals.     Anticipated barriers to physical therapy: none to note     GOALS:   Short Term Goals (6 Weeks):   1.  Independent with initial HEP. - Appropriate, ongoing   2.  Pt will perform nu-step at level 2.0 x 10 minutes without rests breaks going at least 10 step/min or greater. - MET (8/29/2022)  3.  Increase strength by 1/3 MMT grade in right quadruceps  to increase tolerance for ADL and work activities. - Appropriate, ongoing   4.  Patient will improve impaired lower extremity manual muscle tests to >/= 3+/5 to improve dynamic right hip/knee support for functional tasks. - Appropriate, ongoing      Long Term Goals (12 Weeks):   1. Independent with updated HEP. - Appropriate, ongoing   2. Pt will perform nu-step at level 4.0 x 15 minutes without rests breaks going at least 12 step/min or greater. - Appropriate, ongoing   3. Pt will be able to perform TUG in < 10 secs without use of AD placingdemonstrating overall improved functional mobility. - Appropriate, ongoing   4. Pt will performed 30 sec sit to  15 reps without UE support without LOB backward or posterior LE hooking for functional and safe transfers. - Appropriate, ongoing   5. Patient will improve impaired lower extremity manual muscle tests to >/= 4/5 to improve dynamic right hip/knee support for functional tasks. - Appropriate, ongoing   6. Pt will be able to safely perform and tolerate high level ADL's without LOB. - Appropriate, ongoing   7. Pt will have 0 falls from start of PT sessions. - Appropriate, ongoing   8. Pt will ambulate on TM x 10 minutes without use of UE support with 0 LOB at 2.2 mph. - Appropriate, ongoing   9. Pt will perform floor to stand f/b stand to floor transfer with UE support with stand by assist and no cues for improved dynamic  transfer, core stability and fall safety in home and community. - Appropriate, ongoing   10. Pt will begin some form of community fitness to begin regular and consistent performance of exercise to continue maintenance of gains made in PT. - Appropriate, ongoing     PLAN     Emphasize lower extremity strength and improve gait pattern.     Cary Navarro, PT

## 2022-09-12 NOTE — PROGRESS NOTES
Occupational Therapy Daily Treatment Note   Date 9/12/2022    Name: Diego TIRADO [facility] Number: [id number]      Therapy Diagnosis:  Right UE weakness , sp laminectomy and stroke   No diagnosis found.      Physician: Carlos Miller MD       Physician Orders: Z98.890 (ICD-10-CM) - Status post laminectomy   eval and treat   Medical Diagnosis: Z98.890 (ICD-10-CM) - Status post laminectomy      Surgical Procedure/ Date :cervical laminectomy 4/28/22 and spinal cord bleed   Mechanism of Injury: Diego reports: Pt with pain beginning at 2:30 am on 4/28/2022; decorticate posturing up upper extremity and unable to move bilateral lower extremities. Pt was rushing to the ER; Pt's wife states MD states he had bleeding from his head into his spine. 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   Pt stayed in hospital for 16 days then was transferred to [facility] for rehabilitation for 2 months (OT, PT, speech therapy). Pt was discharged and received OT/PT Home Health for 1 month. Pt biggest complaints involves hand movements, elbow extension, AROM of shoulder and ambulation. Pt only with complaints of pain from shoulder to hand (7/10) - describes as burning pain. Pt wife states pt struggling with short term memory and speech due to dysphagia after surgery.   Evaluation Date: 8/11/2022  Insurance:[employer]   Insurance Authorization period Expiration: 9/8/22   Plan of Care Certification Period: 10/31/22   -      Visit # / Visits Authortized: 11/ 11  Time In: 9:00 am   Time Out: 10:00   am    Total Billable Time:   60  minutes     Precautions: Diabetes    Subjective       Pt reports: pins and needles in his IF and LF waking him up at night, and shoulder pain over the weekend     he was compliant with home exercise program given last session.  Response to previous treatment:increased use of right hand   Functional change: none noted yet     Pain: 0/10  Location:  "right UE     Objective       Diego received the following supervised modalities after being cleared for contradictions for 10 minutes:   -moist heat right shoulder  Fluidotherapy: To R hand for 10 min, continuous air, 110 deg, air speed 100 to decrease pain, edema & scar tissue and increased tissue extensibility.       Diego received  Manual  Therapy   for 15  minutes including: in supine   - STM and retrograde massage right hand   - scap mobs and PROM shoulder flex/Abd/ext  seated     NT- PROM shoulder all ways supine  and PROM elbow and wrist  flexion/ extension,  X 10 reps   And in sidelying shoulder flexion, scaption with manual  scapula support      There ex X 34 minutes    UBE L1 FW/BW x3 min ea way   Standing ball on wall alphabet  , 1 set , with tactile cue with scapula to retraction  Weightbearing with BAPS board / marbles 2 minutes   Yellow  web push  with palm flat / pulls 30 reps   Prone on mat scapula " T " and " I " 30 reps    Prone on mat rows  2#  30 reps   Elbow flexion 2# flex/ ext  30 reps   Green flex bar sup/pro 20 reps    WB'ing Towel slides  with 3# in standing  in flexion x20       Yellow t band shoulder rows and  And shoulder extension 30 reps ( at home )       Home Exercises and Education Provided     Education provided:   - continue with HEP   - Progress towards goals     Written Home Exercises Provided: Patient instructed to cont prior HEP.    Exercises were reviewed and Diego was able to demonstrate them prior to the end of the session.  Diego demonstrated good  understanding of the education provided.   .   See EMR under Patient Instructions for exercises provided prior visit.       Assessment     Pt would continue to benefit from skilled OT.  Yes     Tolerated shoulder AROM today despite some discomfort. Reported pins and needles, tingling along median nerve distribution in hand at night, neg for tinel's sign. Will progress WB'ing as tolerated as tolerated for neuro muscular " retraining and scapula support for ROM   Pt tolerated prone scapula ex well on this date   Diego is progressing well towards his goals and there are no updates to goals at this time. Pt prognosis is Good.     Pt will continue to benefit from skilled outpatient occupational therapy to address the deficits listed in the problem list on initial evaluation provide pt/family education and to maximize pt's level of independence in the home and community environment.     Anticipated barriers to occupational therapy:  None     Pt's spiritual, cultural and educational needs considered and pt agreeable to plan of care and goals.      GOALS: 12 weeks. Pt agrees with goals set.     Short Term (6 weeks on 9/30/22 ) .     1)   Patient to be IND with HEP and modalities for pain management, progressing   2)   Increase ROM elbow extension and shoulder flexion, ex , scaption 10-15 degrees  For  Right UE  motion to increase functional hand use for right hand , progressing   3)   Increase  strength by  10 lbs. to grasp right hand , progressing   4)   Increase pinch 1-3 psis for  5, progressing   5)   Decrease edema .1-2cm to increase joint mobility /flexibility for improved overall functional hand use. , progressing   6)   Increase pinch 1-3 psi's to increase IND with button and FM Coordination.     Long Term (by discharge):  1)   Pt will report 0 out of 10 pain with right UE ., progressing   2)   Patient to score of 30% on FOTO to demonstrate improved perception of functional right hand/ arm  Use. Progressing   3)   Pt will return to prior level of function for ADLs and household management, progressing         Plan   Certification Period/Plan of care expiration: 8/11/2022 to  10/31/22 .     Discussed Plan of Care with patient: Yes  Updates/Grading for next session:  Yes       Smitha Quick, OT

## 2022-09-13 ENCOUNTER — HOSPITAL ENCOUNTER (OUTPATIENT)
Dept: RADIOLOGY | Facility: HOSPITAL | Age: 82
Discharge: HOME | End: 2022-09-13
Attending: INTERNAL MEDICINE
Payer: MEDICARE

## 2022-09-13 DIAGNOSIS — K59.00 CONSTIPATION, UNSPECIFIED CONSTIPATION TYPE: ICD-10-CM

## 2022-09-13 PROBLEM — K80.20 CHOLELITHIASIS: Status: ACTIVE | Noted: 2022-09-13

## 2022-09-13 PROCEDURE — 74177 CT ABD & PELVIS W/CONTRAST: CPT | Mod: 26,,, | Performed by: RADIOLOGY

## 2022-09-13 PROCEDURE — 74177 CT ABDOMEN PELVIS WITH CONTRAST: ICD-10-PCS | Mod: 26,,, | Performed by: RADIOLOGY

## 2022-09-13 PROCEDURE — 25500020 PHARM REV CODE 255: Performed by: INTERNAL MEDICINE

## 2022-09-13 PROCEDURE — 74177 CT ABD & PELVIS W/CONTRAST: CPT | Mod: TC

## 2022-09-13 RX ADMIN — IOHEXOL 75 ML: 350 INJECTION, SOLUTION INTRAVENOUS at 10:09

## 2022-09-14 ENCOUNTER — CLINICAL SUPPORT (OUTPATIENT)
Dept: REHABILITATION | Facility: HOSPITAL | Age: 82
End: 2022-09-14
Payer: MEDICARE

## 2022-09-14 ENCOUNTER — TELEPHONE (OUTPATIENT)
Dept: ENDOSCOPY | Facility: HOSPITAL | Age: 82
End: 2022-09-14
Payer: MEDICARE

## 2022-09-14 DIAGNOSIS — R53.1 FUNCTIONAL WEAKNESS: Primary | ICD-10-CM

## 2022-09-14 PROCEDURE — 97110 THERAPEUTIC EXERCISES: CPT

## 2022-09-14 PROCEDURE — 97530 THERAPEUTIC ACTIVITIES: CPT

## 2022-09-14 PROCEDURE — 97022 WHIRLPOOL THERAPY: CPT

## 2022-09-14 NOTE — TELEPHONE ENCOUNTER
----- Message from Jeovany Vizcarra sent at 9/14/2022  1:37 PM CDT -----  Contact: Pt wife Addis  The pt wife, Addis, called and would like to know if his appt on 10/11/22 can be reschedule for ASAP    Please call her back at [phone]

## 2022-09-14 NOTE — PROGRESS NOTES
"  [facility]   Physical Therapy Treatment Note     Name: Diego TIRADO [facility]  Clinic Number: [id number]    Therapy Diagnosis:   Encounter Diagnosis   Name Primary?    Functional weakness Yes     Physician: Carlos Miller MD    Visit Date: 9/14/2022    Physician Orders: PT Eval and Treat   Medical Diagnosis from Referral: Z98.890 (ICD-10-CM) - Status post laminectomy  Evaluation Date: 7/27/2022  Authorization Period Expiration: 09/25/2022  Plan of Care Expiration: 10/24/2022  Progress Note Due: 12th visit   Visit # / Visits authorized: 13 / 17 + eval    PTA Visit #: 0 / 5      Time In: 1000  Time Out: 1055  Total Appointment Time (timed & untimed codes): 55 minutes (4 TA)     Precautions: Standard; Floyd Catheter since April 28th for urinary retention (removed on 08/05/2022)     SUBJECTIVE     Patient reports: no new complaints today. No reported LOB.    He was not compliant with home exercise program secondary to not receiving one.  Response to previous treatment: good; appropriate muscle response  Functional change: ambulated into clinic with RW; states trying to use his cane but he cannot get the coordination    Pain: 0/10, currently  Location: Right side weakness     Patients goals: "return to independence and be able to go back to fitness center without assistance"    OBJECTIVE     Objective Measures updated at progress report unless specified.     Treatment     Diego received the treatments listed below:      THERAPEUTIC ACTIVITIES to develop strength, endurance, ROM and flexibility for 55 minutes including :  - Aerobic activity and endurance training for reciprocal motion of lower limbs on Nustep for 10 minutes at Level 3.5 at > or equal to 50 spm w/o rest to increase mobility, blood flow and improve tissue tolerance.     Parallel bars:   - 6 - inch toe taps; 2 minutes for 2 trials  - 6 - inch step ups; 2 minutes for 2 trials with each leg leading   - Standing calf raises; 3 x 15 reps "   - Lateral walks 5 laps with RTB at ankles; max cueing to decrease hip ER and dragging of feet   - SLS 3 x 20 second hold with 2-finger support on parallel bars with SBA from therapist  -Seated Hip Flexion with GTB with 3 sec hold x 30 reps each LE   - Sit to stands with Green theraband; 2 x 15 reps with SBA from therapist    Gait Training: CGA with no AD for 3 laps around clinic with small hurdles for step overs   Patient Education and Home Exercises     Home Exercises Provided and Patient Education Provided     Education provided:   - Issued an HEP this visit to help improve LE strength and motor control outside of PT. See media (8/29/2022).  - Discussed continuing use of walker at home and in public secondary to patient fear of falling and not feeling stable with use of cane. Patient verbalized understanding.    Written Home Exercises Provided: yes, see media (8/29/2022). Exercises were reviewed and Diego was able to demonstrate them prior to the end of the session.  Diego demonstrated good  understanding of the education provided. See EMR under Patient Instructions for exercises provided during therapy sessions    ASSESSMENT     Patient tolerated exercises fairly well. He requires mod cueing for proper technique throughout session and visual demonstration of some exercises. Patient continues to demonstrate increased difficulty with DF of Right foot to clear a step. Mod cueing required with lateral walks to avoid ER of Right foot due to decreased strength and motor control. Discussed with patient continuing use of walker at home and in public secondary to patient fear of falling and not feeling stable with use of cane. Patient verbalized understanding. Consider addition of gait training exercises in future sessions.  Diego Is progressing well towards his goals.   Pt prognosis is Excellent.     Pt will continue to benefit from skilled outpatient physical therapy to address the deficits listed in the problem  list box on initial evaluation, provide pt/family education and to maximize pt's level of independence in the home and community environment.     Pt's spiritual, cultural and educational needs considered and pt agreeable to plan of care and goals.     Anticipated barriers to physical therapy: none to note     GOALS:   Short Term Goals (6 Weeks):   1.  Independent with initial HEP. - Appropriate, ongoing   2.  Pt will perform nu-step at level 2.0 x 10 minutes without rests breaks going at least 10 step/min or greater. - MET (8/29/2022)  3.  Increase strength by 1/3 MMT grade in right quadruceps  to increase tolerance for ADL and work activities. - Appropriate, ongoing   4.  Patient will improve impaired lower extremity manual muscle tests to >/= 3+/5 to improve dynamic right hip/knee support for functional tasks. - Appropriate, ongoing      Long Term Goals (12 Weeks):   1. Independent with updated HEP. - Appropriate, ongoing   2. Pt will perform nu-step at level 4.0 x 15 minutes without rests breaks going at least 12 step/min or greater. - Appropriate, ongoing   3. Pt will be able to perform TUG in < 10 secs without use of AD placingdemonstrating overall improved functional mobility. - Appropriate, ongoing   4. Pt will performed 30 sec sit to  15 reps without UE support without LOB backward or posterior LE hooking for functional and safe transfers. - Appropriate, ongoing   5. Patient will improve impaired lower extremity manual muscle tests to >/= 4/5 to improve dynamic right hip/knee support for functional tasks. - Appropriate, ongoing   6. Pt will be able to safely perform and tolerate high level ADL's without LOB. - Appropriate, ongoing   7. Pt will have 0 falls from start of PT sessions. - Appropriate, ongoing   8. Pt will ambulate on TM x 10 minutes without use of UE support with 0 LOB at 2.2 mph. - Appropriate, ongoing   9. Pt will perform floor to stand f/b stand to floor transfer with UE support with  stand by assist and no cues for improved dynamic transfer, core stability and fall safety in home and community. - Appropriate, ongoing   10. Pt will begin some form of community fitness to begin regular and consistent performance of exercise to continue maintenance of gains made in PT. - Appropriate, ongoing     PLAN     Emphasize lower extremity strength and improve gait pattern.     Cary Navarro, PT

## 2022-09-14 NOTE — PROGRESS NOTES
Occupational Therapy Daily Treatment Note   Date 9/14/2022    Name: Diego TIRADO [facility] Number: [id number]      Therapy Diagnosis:  Right UE weakness , sp laminectomy and stroke   No diagnosis found.      Physician: Carlos Miller MD       Physician Orders: Z98.890 (ICD-10-CM) - Status post laminectomy   eval and treat   Medical Diagnosis: Z98.890 (ICD-10-CM) - Status post laminectomy      Surgical Procedure/ Date :cervical laminectomy 4/28/22 and spinal cord bleed   Mechanism of Injury: Diego reports: Pt with pain beginning at 2:30 am on 4/28/2022; decorticate posturing up upper extremity and unable to move bilateral lower extremities. Pt was rushing to the ER; Pt's wife states MD states he had bleeding from his head into his spine. 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   Pt stayed in hospital for 16 days then [facility] (OT, PT, speech therapy). Pt was discharged and received OT/PT Home Health for 1 month. Pt biggest complaints involves hand movements, elbow extension, AROM of shoulder and ambulation. Pt only with complaints of pain from shoulder to hand (7/10) - describes as burning pain. Pt wife states pt struggling with short term memory and speech due to dysphagia after surgery.   Evaluation Date: 8/11/2022  Insurance:[employer]   Insurance Authorization period Expiration: 9/8/22   Plan of Care Certification Period: 10/31/22   -      Visit # / Visits Authortized: 11/ 11  Requested new order for more visits  Time In: 9:00 am   Time Out: 10:00   am    Total Billable Time:   60  minutes     Precautions: Diabetes    Subjective       Pt reports: mild pain, just hard to move and swelling     he was compliant with home exercise program given last session.  Response to previous treatment:increased use of right hand   Functional change: none noted yet     Pain: 0/10  Location: right UE  "    Objective       Diego received the following supervised modalities after being cleared for contradictions for 10 minutes:   -moist heat right shoulder  Fluidotherapy: To R hand for 10 min, continuous air, 110 deg, air speed 100 to decrease pain, edema & scar tissue and increased tissue extensibility.        Diego received  Manual  Therapy   for 15  minutes including: in supine   - STM and retrograde massage right hand, forearm   NT- scap mobs and PROM shoulder flex/Abd/ext  seated       NT- PROM shoulder all ways supine  and PROM elbow and wrist  flexion/ extension,  X 10 reps   And in sidelying shoulder flexion, scaption with manual  scapula support      There ex X 35 minutes    NT:UBE L1 FW/BW x3 min ea way  Supine AROM in Scaption, ER    Standing ball on wall alphabet  , 1 set , with tactile cue with scapula to retraction  Weightbearing with BAPS board / marbles 2 minutes   Yellow  web push  with palm flat / pulls 30 reps   Prone on mat scapula " T " and " I " 30 reps    Prone on mat rows  2#  30 reps   Elbow flexion 2# flex/ ext  30 reps   Green flex bar sup/pro 20 reps    WB'ing Towel slides  with 3# in standing  in flexion x20         Yellow t band shoulder rows and  And shoulder extension 30 reps ( at home )       Home Exercises and Education Provided     Education provided:   - continue with HEP   - Progress towards goals     Written Home Exercises Provided: Patient instructed to cont prior HEP.    Exercises were reviewed and Diego was able to demonstrate them prior to the end of the session.  Diego demonstrated good  understanding of the education provided.   .   See EMR under Patient Instructions for exercises provided prior visit.       Assessment     Pt would continue to benefit from skilled OT.  Yes    Pt. Able to make a functional fist after therapy. Pt prognosis is Good. Patient would benefit from continued OT services. Requested new order for authorization.  Objectives to be measured by " primary therapist for reliability.    Pt will continue to benefit from skilled outpatient occupational therapy to address the deficits listed in the problem list on initial evaluation provide pt/family education and to maximize pt's level of independence in the home and community environment.     Anticipated barriers to occupational therapy:  None     Pt's spiritual, cultural and educational needs considered and pt agreeable to plan of care and goals.      GOALS: 12 weeks. Pt agrees with goals set.     Short Term (6 weeks on 9/30/22 ) .     1)   Patient to be IND with HEP and modalities for pain management, progressing   2)   Increase ROM elbow extension and shoulder flexion, ex , scaption 10-15 degrees  For  Right UE  motion to increase functional hand use for right hand , progressing   3)   Increase  strength by  10 lbs. to grasp right hand , progressing   4)   Increase pinch 1-3 psis for  5, progressing   5)   Decrease edema .1-2cm to increase joint mobility /flexibility for improved overall functional hand use. , progressing   6)   Increase pinch 1-3 psi's to increase IND with button and FM Coordination.     Long Term (by discharge):  1)   Pt will report 0 out of 10 pain with right UE ., progressing   2)   Patient to score of 30% on FOTO to demonstrate improved perception of functional right hand/ arm  Use. Progressing   3)   Pt will return to prior level of function for ADLs and household management, progressing         Plan   Certification Period/Plan of care expiration: 8/11/2022 to  10/31/22 .     Discussed Plan of Care with patient: Yes  Updates/Grading for next session:  Yes       Tg Catalan, CODYR/L, CHT

## 2022-09-15 ENCOUNTER — TELEPHONE (OUTPATIENT)
Dept: FAMILY MEDICINE | Facility: CLINIC | Age: 82
End: 2022-09-15
Payer: MEDICARE

## 2022-09-15 DIAGNOSIS — K59.00 CONSTIPATION, UNSPECIFIED CONSTIPATION TYPE: Primary | ICD-10-CM

## 2022-09-15 DIAGNOSIS — Z12.11 COLON CANCER SCREENING: Primary | ICD-10-CM

## 2022-09-15 RX ORDER — POLYETHYLENE GLYCOL 3350, SODIUM SULFATE ANHYDROUS, SODIUM BICARBONATE, SODIUM CHLORIDE, POTASSIUM CHLORIDE 236; 22.74; 6.74; 5.86; 2.97 G/4L; G/4L; G/4L; G/4L; G/4L
POWDER, FOR SOLUTION ORAL
Qty: 8000 ML | Refills: 0 | Status: SHIPPED | OUTPATIENT
Start: 2022-09-15 | End: 2023-01-05

## 2022-09-15 NOTE — TELEPHONE ENCOUNTER
Spoke with patient's wife she states that patient has been going to therapy since end of June beginning of July, she states that x2 months now patient's right hand has been swollen and the right hand and shoulder blade protrudes. She states that therapy does not seem to be helping and also patient has been doing therapy on right shoulder and hand x6 weeks now and patient uses right hand but it hurts. She states when patient eats he uses his left hand instead of the right hand due t the pain. She says patient was giving a glove after a while he has to take it off due to the swelling. Patient's wife says neurosurgeon says it could be a blood clot but he is not sure. She is asking can you order an xray or something to show what is wrong so that they can see what is going on. She says that therapy says that hand is improving but she does not see it. She say that hand is warm to the touch, and shiny. She says that nothing else is swollen on that side but from the fingertips to the wrist, the thumb, first and second finger, pinky finger has a curve in it. She says that it seems as if patient's hand I always radha tensed state as if he cannot relax.

## 2022-09-15 NOTE — TELEPHONE ENCOUNTER
----- Message from Najma Graham sent at 9/15/2022  3:25 PM CDT -----  .Type: Patient Call Back    Who called: wife - Addis     What is the request in detail: complains of hand swelling for a while - neurosurgeon that completed surgery suggested urgent care but she would like to speak with PCP - please call     Can the clinic reply by [facility]? No     Would the patient rather a call back or a response via [facility]? Call     Best call back number: .[phone]      Additional Information .  [facility] - [place], [place] - [address]  [address]  [place] [address]  Phone: [phone] Fax: [phone]

## 2022-09-16 ENCOUNTER — HOSPITAL ENCOUNTER (EMERGENCY)
Facility: HOSPITAL | Age: 82
Discharge: HOME | End: 2022-09-16
Attending: EMERGENCY MEDICINE
Payer: MEDICARE

## 2022-09-16 ENCOUNTER — PATIENT MESSAGE (OUTPATIENT)
Dept: ENDOSCOPY | Facility: HOSPITAL | Age: 82
End: 2022-09-16
Payer: MEDICARE

## 2022-09-16 VITALS
RESPIRATION RATE: 18 BRPM | SYSTOLIC BLOOD PRESSURE: 148 MMHG | DIASTOLIC BLOOD PRESSURE: 74 MMHG | HEIGHT: 69 IN | HEART RATE: 76 BPM | OXYGEN SATURATION: 99 % | TEMPERATURE: 98 F | BODY MASS INDEX: 24.14 KG/M2 | WEIGHT: 163 LBS

## 2022-09-16 DIAGNOSIS — R22.31 LOCALIZED SWELLING ON RIGHT HAND: Primary | ICD-10-CM

## 2022-09-16 LAB
ALBUMIN SERPL BCP-MCNC: 4.1 G/DL (ref 3.5–5.2)
ALP SERPL-CCNC: 57 U/L (ref 55–135)
ALT SERPL W/O P-5'-P-CCNC: 20 U/L (ref 10–44)
ANION GAP SERPL CALC-SCNC: 9 MMOL/L (ref 8–16)
AST SERPL-CCNC: 23 U/L (ref 10–40)
BASOPHILS # BLD AUTO: 0.04 K/UL (ref 0–0.2)
BASOPHILS NFR BLD: 0.7 % (ref 0–1.9)
BILIRUB SERPL-MCNC: 0.4 MG/DL (ref 0.1–1)
BUN SERPL-MCNC: 9 MG/DL (ref 8–23)
CALCIUM SERPL-MCNC: 9.6 MG/DL (ref 8.7–10.5)
CHLORIDE SERPL-SCNC: 107 MMOL/L (ref 95–110)
CO2 SERPL-SCNC: 25 MMOL/L (ref 23–29)
CREAT SERPL-MCNC: 1 MG/DL (ref 0.5–1.4)
D DIMER PPP IA.FEU-MCNC: 0.51 MG/L FEU
DIFFERENTIAL METHOD: ABNORMAL
EOSINOPHIL # BLD AUTO: 0.2 K/UL (ref 0–0.5)
EOSINOPHIL NFR BLD: 3.2 % (ref 0–8)
ERYTHROCYTE [DISTWIDTH] IN BLOOD BY AUTOMATED COUNT: 14.8 % (ref 11.5–14.5)
EST. GFR  (NO RACE VARIABLE): >60 ML/MIN/1.73 M^2
GLUCOSE SERPL-MCNC: 88 MG/DL (ref 70–110)
HCT VFR BLD AUTO: 37.2 % (ref 40–54)
HGB BLD-MCNC: 12.1 G/DL (ref 14–18)
HIV 1+2 AB+HIV1 P24 AG SERPL QL IA: NORMAL
IMM GRANULOCYTES # BLD AUTO: 0.02 K/UL (ref 0–0.04)
IMM GRANULOCYTES NFR BLD AUTO: 0.4 % (ref 0–0.5)
LYMPHOCYTES # BLD AUTO: 1.7 K/UL (ref 1–4.8)
LYMPHOCYTES NFR BLD: 31.4 % (ref 18–48)
MCH RBC QN AUTO: 26.8 PG (ref 27–31)
MCHC RBC AUTO-ENTMCNC: 32.5 G/DL (ref 32–36)
MCV RBC AUTO: 82 FL (ref 82–98)
MONOCYTES # BLD AUTO: 0.8 K/UL (ref 0.3–1)
MONOCYTES NFR BLD: 13.7 % (ref 4–15)
NEUTROPHILS # BLD AUTO: 2.8 K/UL (ref 1.8–7.7)
NEUTROPHILS NFR BLD: 50.6 % (ref 38–73)
NRBC BLD-RTO: 0 /100 WBC
PLATELET # BLD AUTO: 232 K/UL (ref 150–450)
PMV BLD AUTO: 10.6 FL (ref 9.2–12.9)
POTASSIUM SERPL-SCNC: 4.2 MMOL/L (ref 3.5–5.1)
PROT SERPL-MCNC: 7 G/DL (ref 6–8.4)
RBC # BLD AUTO: 4.52 M/UL (ref 4.6–6.2)
SODIUM SERPL-SCNC: 141 MMOL/L (ref 136–145)
WBC # BLD AUTO: 5.54 K/UL (ref 3.9–12.7)

## 2022-09-16 PROCEDURE — 99284 EMERGENCY DEPT VISIT MOD MDM: CPT | Mod: 25

## 2022-09-16 PROCEDURE — 99282 PR EMERGENCY DEPT VISIT,LEVEL II: ICD-10-PCS | Mod: ,,, | Performed by: EMERGENCY MEDICINE

## 2022-09-16 PROCEDURE — 99282 EMERGENCY DEPT VISIT SF MDM: CPT | Mod: ,,, | Performed by: EMERGENCY MEDICINE

## 2022-09-16 PROCEDURE — 85379 FIBRIN DEGRADATION QUANT: CPT | Performed by: EMERGENCY MEDICINE

## 2022-09-16 PROCEDURE — 80053 COMPREHEN METABOLIC PANEL: CPT | Performed by: EMERGENCY MEDICINE

## 2022-09-16 PROCEDURE — 87389 HIV-1 AG W/HIV-1&-2 AB AG IA: CPT | Performed by: PHYSICIAN ASSISTANT

## 2022-09-16 PROCEDURE — 85025 COMPLETE CBC W/AUTO DIFF WBC: CPT | Performed by: EMERGENCY MEDICINE

## 2022-09-16 NOTE — ED TRIAGE NOTES
Patient presents to the ER with complaints of right arm swelling for last couple of days, pt had procedure for stenosis hematoma in April. Patient denies any surgeries to his right arm or injuries. Patient was recently taken off coumadin due to hematoma.

## 2022-09-16 NOTE — ED PROVIDER NOTES
Encounter Date: 9/16/2022       History     Chief Complaint   Patient presents with    Arm Swelling     R arm swelling, off coumadin 6 weeks, hx blood clots,      82-year-old male with multiple medical problems presents with several days of worsening right hand swelling.  No previous symptoms similar to this.  He does have a history of blood clots.  He is off Coumadin.  In April he had neck surgery because of a reported hematoma.  He was in the hospital for 2 weeks and rehab 30 days.  He has had a winged scapula since surgery.  No recent trauma history.  Patient denies nausea, vomiting, diarrhea, fever, cough, shortness of breath, chest pain, abdominal pain, or dysuria.  A ten point review of systems was completed and is negative except as documented above.  Patient denies any other acute medical complaint.  The patients available PMH, PSH, Social History, medications, allergies, and triage vital signs were reviewed just prior to their medical evaluation.  Patient is a difficult historian with limited insight into their medical issues.  Wife is similar.    Review of patient's allergies indicates:  No Known Allergies  Past Medical History:   Diagnosis Date    Antiphospholipid syndrome 11/19/2021    Dysphagia     Hx of colonic polyps     followed by GI. Dr. Pham    Hyperlipidemia LDL goal <100     Overweight(278.02)     Prostate cancer september 2011    followed by urology, Dr. Rogers    Type II or unspecified type diabetes mellitus without mention of complication, not stated as uncontrolled     diet controlled     Past Surgical History:   Procedure Laterality Date    BRONCHOSCOPY N/A 10/15/2018    Procedure: BRONCHOSCOPY;  Surgeon: Pratibha Diagnostic Provider;  Location: [facility] OR [address] [place] [address];  Service: Anesthesiology;  Laterality: N/A;    CATARACT EXTRACTION, BILATERAL  2014    POSTERIOR CERVICAL LAMINECTOMY Bilateral 4/28/2022    Procedure: LAMINECTOMY, SPINE, CERVICAL, POSTERIOR APPROACH C3-6;  Surgeon: Carlos Miller,  MD;  Location: [facility] OR [address] [place] [address];  Service: Neurosurgery;  Laterality: Bilateral;    PROSTATECTOMY       Family History   Problem Relation Age of Onset    Colon cancer Mother     Diabetes Mother     Heart disease Father     Mental illness Sister     Diabetes Sister     Other Brother         polio as a child    Pulmonary fibrosis Brother     Diabetes Brother     Myasthenia gravis Brother     Parkinsonism Brother     Diabetes Brother     Dementia Brother     Lung cancer Brother         smoker    Diabetes Maternal Aunt     Diabetes Other     Esophageal cancer Neg Hx      Social History     Tobacco Use    Smoking status: Former     Packs/day: 0.25     Years: 5.00     Pack years: 1.25     Types: Cigarettes     Quit date: 10/2/1962     Years since quittin.9    Smokeless tobacco: Former    Tobacco comments:     quit age 23   Substance Use Topics    Alcohol use: Not Currently    Drug use: No     Review of Systems   Constitutional:  Negative for fever.   HENT:  Negative for sore throat.    Eyes:  Negative for visual disturbance.   Respiratory:  Negative for cough and shortness of breath.    Cardiovascular:  Negative for chest pain and leg swelling.   Gastrointestinal:  Negative for abdominal pain, diarrhea, nausea and vomiting.   Genitourinary:  Negative for dysuria.   Musculoskeletal:  Negative for arthralgias, joint swelling, myalgias and neck pain.   Skin:  Negative for rash and wound.   Allergic/Immunologic: Negative for immunocompromised state.   Neurological:  Negative for syncope.   Psychiatric/Behavioral:  Negative for confusion.      Physical Exam     Initial Vitals [22 1133]   BP Pulse Resp Temp SpO2   (!) 141/66 63 18 99.8 °F (37.7 °C) 97 %      MAP       --         Physical Exam    Nursing note and vitals reviewed.  Constitutional: He appears well-developed and well-nourished. He is not diaphoretic. No distress.   HENT:   Head: Normocephalic and atraumatic.   Nose: Nose normal.   Eyes: Conjunctivae are  normal. Right eye exhibits no discharge. Left eye exhibits no discharge.   Neck: Neck supple.   Normal range of motion.  Cardiovascular:  Normal rate, regular rhythm, normal heart sounds and intact distal pulses.     Exam reveals no gallop and no friction rub.       No murmur heard.  Pulmonary/Chest: Breath sounds normal. No respiratory distress. He has no wheezes. He has no rhonchi. He has no rales.   Abdominal: Abdomen is soft. He exhibits no distension. There is no abdominal tenderness. There is no rebound and no guarding.   Musculoskeletal:         General: Edema present. No tenderness. Normal range of motion.      Cervical back: Normal range of motion and neck supple.      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 He has normal strength. GCS score is 15. GCS eye subscore is 4. GCS verbal subscore is 5. GCS motor subscore is 6.   Skin: Skin is warm and dry. No rash noted. No erythema.   Psychiatric: He has a normal mood and affect. His behavior is normal. Judgment and thought content normal.       ED Course   Procedures  Labs Reviewed   CBC W/ AUTO DIFFERENTIAL - Abnormal; Notable for the following components:       Result Value    RBC 4.52 (*)     Hemoglobin 12.1 (*)     Hematocrit 37.2 (*)     MCH 26.8 (*)     RDW 14.8 (*)     All other components within normal limits   D DIMER, QUANTITATIVE - Abnormal; Notable for the following components:    D-Dimer 0.51 (*)     All other components within normal limits   HIV 1 / 2 ANTIBODY    Narrative:     Release to patient->Immediate   COMPREHENSIVE METABOLIC PANEL          Imaging Results              US Upper Extremity Veins Right (Final result)  Result time 09/16/22 15:09:41      Final result by Mykel Farah Jr., MD (09/16/22 15:09:41)                   Impression:      No thrombus in central veins of the right  upper extremity.    Electronically signed by resident: Jr Ortiz  Date:    09/16/2022  Time:    15:01    Electronically signed by: Mykel Manzo Jr  Date:    09/16/2022  Time:    15:09               Narrative:    EXAMINATION:  US UPPER EXTREMITY VEINS RIGHT    CLINICAL HISTORY:  right hand swelling;    TECHNIQUE:  Duplex and color flow Doppler evaluation and dynamic compression was performed of the right upper extremity veins.    COMPARISON:  None    FINDINGS:  Central veins: The internal jugular, subclavian, and axillary veins are patent and free of thrombus.    Arm veins: The brachial, basilic, and cephalic veins are patent and compressible.    Contralateral subclavian/internal jugular veins: The left subclavian and internal jugular veins are patent and free of thrombus.    Other findings: None.                                       Medications - No data to display  Medical Decision Making:   History:   I obtained history from: someone other than patient.       <> Summary of History: Wife assists HPI  Old Medical Records: I decided to obtain old medical records.  Old Records Summarized: records from clinic visits.       <> Summary of Records: Clinic notes  Clinical Tests:   Lab Tests: Ordered and Reviewed  Radiological Study: Ordered and Reviewed  ED Management:  82-year-old male presents with atraumatic right hand swelling.  Vitals with hypertension.  Physical exam as above.  Labs unremarkable.  Dimer normal for age.  Ultrasound without DVT.  Normal arterial pulses.  Doubt acute limb threat.  No indication for further imaging or consultation.  Will discharge home.  Patient will call their primary physician tomorrow to schedule close follow-up.  Did bedside teaching with return precautions.  All questions answered.  The patient acknowledges understanding.  Gave verbal discharge instructions.  Patient will return to ED for worsening symptoms, inability to eat/drink, fever greater than 100.4, or any other  concerns.                         Clinical Impression:   Final diagnoses:  [R22.31] Localized swelling on right hand (Primary)      ED Disposition Condition    Discharge Stable          ED Prescriptions    None       Follow-up Information       Follow up With Specialties Details Why Contact Info    Follow up with primary physician as soon as possible.  Call tomorrow for an appointment.        [facility] - Emergency Dept Emergency Medicine  Return to ED for worsening symptoms, inability to eat/drink, fever greater than 100.4, or any other concerns. [address] [facility] [id number] [phone]          Level of Complexity:  High, level 5.      Dalton Gómez MD  09/16/22 [id number]

## 2022-09-16 NOTE — ED NOTES
Patient placed in hospital gown. Patient placed on cardiac monitor, bp cuff, and continuous pulse ox. Call light within reach. Family at bedside.

## 2022-09-18 ENCOUNTER — HOSPITAL ENCOUNTER (EMERGENCY)
Facility: HOSPITAL | Age: 82
Discharge: HOME | End: 2022-09-19
Attending: STUDENT IN AN ORGANIZED HEALTH CARE EDUCATION/TRAINING PROGRAM
Payer: MEDICARE

## 2022-09-18 DIAGNOSIS — K40.90 RIGHT INGUINAL HERNIA: Primary | ICD-10-CM

## 2022-09-18 DIAGNOSIS — R10.9 ABDOMINAL PAIN: ICD-10-CM

## 2022-09-18 PROCEDURE — 96374 THER/PROPH/DIAG INJ IV PUSH: CPT

## 2022-09-18 PROCEDURE — 99285 EMERGENCY DEPT VISIT HI MDM: CPT | Mod: 25

## 2022-09-18 PROCEDURE — 96361 HYDRATE IV INFUSION ADD-ON: CPT

## 2022-09-18 PROCEDURE — 99285 PR EMERGENCY DEPT VISIT,LEVEL V: ICD-10-PCS | Mod: GC,,, | Performed by: STUDENT IN AN ORGANIZED HEALTH CARE EDUCATION/TRAINING PROGRAM

## 2022-09-18 PROCEDURE — 99285 EMERGENCY DEPT VISIT HI MDM: CPT | Mod: GC,,, | Performed by: STUDENT IN AN ORGANIZED HEALTH CARE EDUCATION/TRAINING PROGRAM

## 2022-09-19 VITALS
WEIGHT: 163 LBS | HEART RATE: 55 BPM | TEMPERATURE: 98 F | RESPIRATION RATE: 18 BRPM | DIASTOLIC BLOOD PRESSURE: 70 MMHG | OXYGEN SATURATION: 98 % | BODY MASS INDEX: 24.07 KG/M2 | SYSTOLIC BLOOD PRESSURE: 147 MMHG

## 2022-09-19 LAB
ALBUMIN SERPL BCP-MCNC: 4.4 G/DL (ref 3.5–5.2)
ALP SERPL-CCNC: 74 U/L (ref 55–135)
ALT SERPL W/O P-5'-P-CCNC: 23 U/L (ref 10–44)
ANION GAP SERPL CALC-SCNC: 10 MMOL/L (ref 8–16)
AST SERPL-CCNC: 22 U/L (ref 10–40)
BACTERIA #/AREA URNS AUTO: ABNORMAL /HPF
BASOPHILS # BLD AUTO: 0.06 K/UL (ref 0–0.2)
BASOPHILS NFR BLD: 0.6 % (ref 0–1.9)
BILIRUB SERPL-MCNC: 0.4 MG/DL (ref 0.1–1)
BILIRUB UR QL STRIP: NEGATIVE
BUN SERPL-MCNC: 14 MG/DL (ref 8–23)
CALCIUM SERPL-MCNC: 9.9 MG/DL (ref 8.7–10.5)
CHLORIDE SERPL-SCNC: 102 MMOL/L (ref 95–110)
CLARITY UR REFRACT.AUTO: CLEAR
CO2 SERPL-SCNC: 26 MMOL/L (ref 23–29)
COLOR UR AUTO: YELLOW
CREAT SERPL-MCNC: 0.9 MG/DL (ref 0.5–1.4)
DIFFERENTIAL METHOD: ABNORMAL
EOSINOPHIL # BLD AUTO: 0.2 K/UL (ref 0–0.5)
EOSINOPHIL NFR BLD: 2.3 % (ref 0–8)
ERYTHROCYTE [DISTWIDTH] IN BLOOD BY AUTOMATED COUNT: 15 % (ref 11.5–14.5)
EST. GFR  (NO RACE VARIABLE): >60 ML/MIN/1.73 M^2
GLUCOSE SERPL-MCNC: 106 MG/DL (ref 70–110)
GLUCOSE UR QL STRIP: NEGATIVE
HCT VFR BLD AUTO: 40.3 % (ref 40–54)
HGB BLD-MCNC: 13.3 G/DL (ref 14–18)
HGB UR QL STRIP: NEGATIVE
IMM GRANULOCYTES # BLD AUTO: 0.03 K/UL (ref 0–0.04)
IMM GRANULOCYTES NFR BLD AUTO: 0.3 % (ref 0–0.5)
INR PPP: 1.1 (ref 0.8–1.2)
KETONES UR QL STRIP: NEGATIVE
LACTATE SERPL-SCNC: 1.1 MMOL/L (ref 0.5–2.2)
LEUKOCYTE ESTERASE UR QL STRIP: ABNORMAL
LYMPHOCYTES # BLD AUTO: 2.1 K/UL (ref 1–4.8)
LYMPHOCYTES NFR BLD: 19.7 % (ref 18–48)
MCH RBC QN AUTO: 26.9 PG (ref 27–31)
MCHC RBC AUTO-ENTMCNC: 33 G/DL (ref 32–36)
MCV RBC AUTO: 81 FL (ref 82–98)
MICROSCOPIC COMMENT: ABNORMAL
MONOCYTES # BLD AUTO: 1.1 K/UL (ref 0.3–1)
MONOCYTES NFR BLD: 10.4 % (ref 4–15)
NEUTROPHILS # BLD AUTO: 7 K/UL (ref 1.8–7.7)
NEUTROPHILS NFR BLD: 66.7 % (ref 38–73)
NITRITE UR QL STRIP: POSITIVE
NRBC BLD-RTO: 0 /100 WBC
PH UR STRIP: 6 [PH] (ref 5–8)
PLATELET # BLD AUTO: 275 K/UL (ref 150–450)
PMV BLD AUTO: 9.8 FL (ref 9.2–12.9)
POTASSIUM SERPL-SCNC: 4.1 MMOL/L (ref 3.5–5.1)
PROT SERPL-MCNC: 7.6 G/DL (ref 6–8.4)
PROT UR QL STRIP: NEGATIVE
PROTHROMBIN TIME: 11.2 SEC (ref 9–12.5)
RBC # BLD AUTO: 4.95 M/UL (ref 4.6–6.2)
RBC #/AREA URNS AUTO: 3 /HPF (ref 0–4)
SODIUM SERPL-SCNC: 138 MMOL/L (ref 136–145)
SP GR UR STRIP: 1.01 (ref 1–1.03)
SQUAMOUS #/AREA URNS AUTO: 0 /HPF
URN SPEC COLLECT METH UR: ABNORMAL
WBC # BLD AUTO: 10.41 K/UL (ref 3.9–12.7)
WBC #/AREA URNS AUTO: 52 /HPF (ref 0–5)

## 2022-09-19 PROCEDURE — 87088 URINE BACTERIA CULTURE: CPT

## 2022-09-19 PROCEDURE — 25500020 PHARM REV CODE 255: Performed by: STUDENT IN AN ORGANIZED HEALTH CARE EDUCATION/TRAINING PROGRAM

## 2022-09-19 PROCEDURE — 85610 PROTHROMBIN TIME: CPT

## 2022-09-19 PROCEDURE — 63600175 PHARM REV CODE 636 W HCPCS

## 2022-09-19 PROCEDURE — 93005 ELECTROCARDIOGRAM TRACING: CPT

## 2022-09-19 PROCEDURE — 83605 ASSAY OF LACTIC ACID: CPT

## 2022-09-19 PROCEDURE — 80053 COMPREHEN METABOLIC PANEL: CPT

## 2022-09-19 PROCEDURE — 81001 URINALYSIS AUTO W/SCOPE: CPT

## 2022-09-19 PROCEDURE — 93010 EKG 12-LEAD: ICD-10-PCS | Mod: ,,, | Performed by: INTERNAL MEDICINE

## 2022-09-19 PROCEDURE — 87086 URINE CULTURE/COLONY COUNT: CPT

## 2022-09-19 PROCEDURE — 93010 ELECTROCARDIOGRAM REPORT: CPT | Mod: ,,, | Performed by: INTERNAL MEDICINE

## 2022-09-19 PROCEDURE — 85025 COMPLETE CBC W/AUTO DIFF WBC: CPT

## 2022-09-19 RX ORDER — CEFPODOXIME PROXETIL 200 MG/1
200 TABLET, FILM COATED ORAL 2 TIMES DAILY
Qty: 20 TABLET | Refills: 0 | Status: SHIPPED | OUTPATIENT
Start: 2022-09-19 | End: 2022-09-29

## 2022-09-19 RX ORDER — MORPHINE SULFATE 4 MG/ML
4 INJECTION, SOLUTION INTRAMUSCULAR; INTRAVENOUS
Status: COMPLETED | OUTPATIENT
Start: 2022-09-19 | End: 2022-09-19

## 2022-09-19 RX ADMIN — IOHEXOL 75 ML: 350 INJECTION, SOLUTION INTRAVENOUS at 01:09

## 2022-09-19 RX ADMIN — MORPHINE SULFATE 4 MG: 4 INJECTION INTRAVENOUS at 01:09

## 2022-09-19 RX ADMIN — SODIUM CHLORIDE, SODIUM LACTATE, POTASSIUM CHLORIDE, AND CALCIUM CHLORIDE 1000 ML: .6; .31; .03; .02 INJECTION, SOLUTION INTRAVENOUS at 01:09

## 2022-09-19 NOTE — DISCHARGE INSTRUCTIONS
Diagnosis: Right Inguinal Hernia and Urinary Tract infection    Tests you had showed:   Labs Reviewed   CBC W/ AUTO DIFFERENTIAL - Abnormal; Notable for the following components:       Result Value    Hemoglobin 13.3 (*)     MCV 81 (*)     MCH 26.9 (*)     RDW 15.0 (*)     Mono # 1.1 (*)     All other components within normal limits   URINALYSIS, REFLEX TO URINE CULTURE - Abnormal; Notable for the following components:    Nitrite, UA Positive (*)     Leukocytes, UA 3+ (*)     All other components within normal limits    Narrative:     Specimen Source->Urine   URINALYSIS MICROSCOPIC - Abnormal; Notable for the following components:    WBC, UA 52 (*)     All other components within normal limits    Narrative:     Specimen Source->Urine   CULTURE, URINE   COMPREHENSIVE METABOLIC PANEL   PROTIME-INR   LACTIC ACID, PLASMA      CT Abdomen Pelvis With Contrast   Final Result   Abnormal      Small bowel, mesentery, and fluid containing right inguinal hernia with partial upstream small bowel obstruction and swirling of the mesenteric vessels concerning for strangulated hernia.      Diverticulosis without evidence of acute diverticulitis.      Cholelithiasis without evidence of acute cholecystitis.      Mild nodular contour of the liver which may suggest chronic liver disease.      Bibasilar pulmonary emphysema.      Additional findings as above.      This report was flagged in Epic as abnormal.      Electronically signed by resident: Uma Nelson   Date:    09/19/2022   Time:    02:19      Electronically signed by: Leo Nugent MD   Date:    09/19/2022   Time:    02:44          Treatments you received were:   Medications   morphine injection 4 mg (4 mg Intravenous Given 9/19/22 0120)   lactated ringers bolus 1,000 mL (1,000 mLs Intravenous New Bag 9/19/22 0121)   iohexoL (OMNIPAQUE 350) injection 75 mL (75 mLs Intravenous Given 9/19/22 0159)       Home Care Instructions:  - Medications: Take the full course of  antibiotic as prescribed    Follow-Up Plan:  - Follow-up with: Surgery within the week  - Additional testing and/or evaluation will be directed by your primary doctor    Return to the Emergency Department for symptoms including but not limited to: worsening symptoms, severe back pain, shortness of breath or chest pain, vomiting with inability to hold down fluids, blood in vomit or poop, fevers greater than 100.4°F, passing out/fainting/unconsciousness, or other concerning symptoms.

## 2022-09-19 NOTE — CONSULTS
[facility] - Emergency Dept  General Surgery  Consult Note    Inpatient consult to General Surgery  Consult performed by: Mj Rich MD  Consult ordered by: Carlo Underwood MD      Subjective:     Chief Complaint/Reason for Admission: Right groin pain    History of Present Illness: Mr. Gunter is our 83yo male with HLD, prostate cancer, DM2 who presents today for right groin pain.      Patient has been having an issue with constipation and is being worked up by gastrointerology for this issue.  Comes in to the ED today with new onset right groin pain that began around 9pm on 9/18/22.  States that he noticed swelling in his right groin and a pain that began.  No overlying skin color changes.  Denies any nausea or emesis.  No abdominal pain.  Last bowel movement was on 9/18/22 and was normal.  No pain or blood in urine.  Has never had a right inguinal hernia repair reported by patient.      Workup in ED shows no leukocytosis, H/H stable, or lactic acidosis.  Renal panel unremarkable.  UA with evidence of possible UTI with positive nitrities, leukocytes, and WBC.  CT imaging performed which shows a right inguinal hernia, loop of small bowel in right inguinal hernia with some associated proximal small bowel dilation, and edema concerning for a strangulated right inguinal hernia.    Had a prostatectomy.    Takes ASA otherwise no antiplatelet or anticoagulant.    No current facility-administered medications on file prior to encounter.     Current Outpatient Medications on File Prior to Encounter   Medication Sig    b complex vitamins capsule Take 1 capsule by mouth once daily.    ethambutoL (MYAMBUTOL) 400 MG Tab Take 2 tablets (800 mg total) by mouth once daily.    nebulizer and compressor ([employer] COMPRESSOR SYSTEM) Marcy use to administer nebulized medication as directed (Patient not taking: Reported on 9/8/2022)    omeprazole (PRILOSEC) 10 MG capsule ONE CAPSULE BY MOUTH once a day as needed    polyethylene glycol  (GOLYTELY) 236-22.74-6.74 -5.86 gram suspension Day 1: Dose 1, 4000ml. Day 2: Dose 2, 4000ml. For total of 8000ml.    pravastatin (PRAVACHOL) 10 MG tablet Take 1 tablet (10 mg total) by mouth once daily.    sodium chloride 3% 3 % nebulizer solution SMARTSIG:Via Inhaler    sodium chloride 3% 3 % nebulizer solution USE 4 ML VIAL IN NEBULIZER  TWICE DAILY    [DISCONTINUED] aspirin (ECOTRIN) 81 MG EC tablet Take 81 mg by mouth.    [DISCONTINUED] omega-3 fatty acids 1,000 mg Cap Take 2 g by mouth.       Review of patient's allergies indicates:  No Known Allergies    Past Medical History:   Diagnosis Date    Antiphospholipid syndrome 11/19/2021    Dysphagia     Hx of colonic polyps     followed by GI. Dr. Pham    Hyperlipidemia LDL goal <100     Overweight(278.02)     Prostate cancer september 2011    followed by urology, Dr. Rogers    Type II or unspecified type diabetes mellitus without mention of complication, not stated as uncontrolled     diet controlled     Past Surgical History:   Procedure Laterality Date    BRONCHOSCOPY N/A 10/15/2018    Procedure: BRONCHOSCOPY;  Surgeon: Pratibha Diagnostic Provider;  Location: [facility] OR [address] [place] [address];  Service: Anesthesiology;  Laterality: N/A;    CATARACT EXTRACTION, BILATERAL  2014    POSTERIOR CERVICAL LAMINECTOMY Bilateral 4/28/2022    Procedure: LAMINECTOMY, SPINE, CERVICAL, POSTERIOR APPROACH C3-6;  Surgeon: Carlos Miller MD;  Location: [facility] OR [address] [place] [address];  Service: Neurosurgery;  Laterality: Bilateral;    PROSTATECTOMY       Family History       Problem Relation (Age of Onset)    Colon cancer Mother    Dementia Brother    Diabetes Mother, Sister, Brother, Brother, Maternal Aunt, Other    Heart disease Father    Lung cancer Brother    Mental illness Sister    Myasthenia gravis Brother    Other Brother    Parkinsonism Brother    Pulmonary fibrosis Brother          Tobacco Use    Smoking status: Former     Packs/day: 0.25     Years: 5.00     Pack years: 1.25     Types: Cigarettes      Quit date: 10/2/1962     Years since quittin.0    Smokeless tobacco: Former    Tobacco comments:     quit age 23   Substance and Sexual Activity    Alcohol use: Not Currently    Drug use: No    Sexual activity: Yes     Partners: Female     Review of Systems   Constitutional:  Negative for chills and fever.   HENT: Negative.     Respiratory: Negative.     Cardiovascular: Negative.    Gastrointestinal:  Positive for constipation. Negative for abdominal distention, abdominal pain, diarrhea, nausea and vomiting.        Right groin pain and swelling   Genitourinary:  Negative for dysuria and hematuria.   Musculoskeletal: Negative.    Skin:  Negative for color change and wound.   Neurological: Negative.    Psychiatric/Behavioral: Negative.     Objective:     Vital Signs (Most Recent):  Temp: 97.5 °F (36.4 °C) (226)  Pulse: (!) 55 (224)  Resp: 18 (22 0120)  BP: (!) 147/70 (224)  SpO2: 98 % (22)   Vital Signs (24h Range):  Temp:  [97.5 °F (36.4 °C)] 97.5 °F (36.4 °C)  Pulse:  [55-60] 55  Resp:  [18-20] 18  SpO2:  [96 %-98 %] 98 %  BP: (144-147)/(70) 147/70     Weight: 73.9 kg (163 lb)  Body mass index is 24.07 kg/m².    No intake or output data in the 24 hours ending 22 0242    Physical Exam  Constitutional:       General: He is not in acute distress.     Appearance: Normal appearance. He is not ill-appearing.   HENT:      Head: Normocephalic and atraumatic.   Eyes:      Extraocular Movements: Extraocular movements intact.   Cardiovascular:      Rate and Rhythm: Normal rate and regular rhythm.      Pulses: Normal pulses.   Pulmonary:      Effort: Pulmonary effort is normal. No respiratory distress.   Abdominal:      General: There is no distension.      Palpations: Abdomen is soft.      Tenderness: There is no abdominal tenderness. There is no guarding.      Hernia: A hernia (right inguinal, easily reducible) is present.      Comments: Right inguinal hernia, easily  reducible.  No overlying erythema.  Incision noted over right inguinal region however patient denies history of any prior hernia repairs   Musculoskeletal:         General: Normal range of motion.      Cervical back: Normal range of motion.   Skin:     General: Skin is warm and dry.   Neurological:      General: No focal deficit present.      Mental Status: He is alert and oriented to person, place, and time.   Psychiatric:         Mood and Affect: Mood normal.         Behavior: Behavior normal.       Significant Labs:  Recent Lab Results         09/19/22  0103   09/19/22  0040        Albumin   4.4       Alkaline Phosphatase   74       ALT   23       Anion Gap   10       Appearance, UA Clear         AST   22       Bacteria, UA None         Baso #   0.06       Basophil %   0.6       Bilirubin (UA) Negative         BILIRUBIN TOTAL   0.4  Comment: For infants and newborns, interpretation of results should be based  on gestational age, weight and in agreement with clinical  observations.    Premature Infant recommended reference ranges:  Up to 24 hours.............<8.0 mg/dL  Up to 48 hours............<12.0 mg/dL  3-5 days..................<15.0 mg/dL  6-29 days.................<15.0 mg/dL         BUN   14       Calcium   9.9       Chloride   102       CO2   26       Color, UA Yellow         Creatinine   0.9       Differential Method   Automated       eGFR   >60.0       Eos #   0.2       Eosinophil %   2.3       Glucose   106       Glucose, UA Negative         Gran # (ANC)   7.0       Gran %   66.7       Hematocrit   40.3       Hemoglobin   13.3       Immature Grans (Abs)   0.03  Comment: Mild elevation in immature granulocytes is non specific and   can be seen in a variety of conditions including stress response,   acute inflammation, trauma and pregnancy. Correlation with other   laboratory and clinical findings is essential.         Immature Granulocytes   0.3       INR   1.1  Comment: Coumadin Therapy:  2.0 - 3.0  for INR for all indicators except mechanical heart valves  and antiphospholipid syndromes which should use 2.5 - 3.5.         Ketones, UA Negative         Lactate, Avel   1.1  Comment: Falsely low lactic acid results can be found in samples   containing >=13.0 mg/dL total bilirubin and/or >=3.5 mg/dL   direct bilirubin.         Leukocytes, UA 3+         Lymph #   2.1       Lymph %   19.7       MCH   26.9       MCHC   33.0       MCV   81       Microscopic Comment SEE COMMENT  Comment: Other formed elements not mentioned in the report are not   present in the microscopic examination.            Mono #   1.1       Mono %   10.4       MPV   9.8       NITRITE UA Positive         nRBC   0       Occult Blood UA Negative         pH, UA 6.0         Platelets   275       Potassium   4.1       PROTEIN TOTAL   7.6       Protein, UA Negative  Comment: Recommend a 24 hour urine protein or a urine   protein/creatinine ratio if globulin induced proteinuria is  clinically suspected.           Protime   11.2       RBC   4.95       RBC, UA 3         RDW   15.0       Sodium   138       Specific [place], UA 1.010         Specimen UA Urine, Clean Catch         Squam Epithel, UA 0         WBC, UA 52         WBC   10.41               Significant Diagnostics:  I have reviewed all pertinent imaging results/findings within the past 24 hours.    Assessment/Plan:     There are no hospital problems to display for this patient.    Mr. Gunter is our 81yo male who presents with a right inguinal hernia.  Has no overlying skin changes, no leukocytosis, no lactic acidosis, and right inguinal hernia was easily reducible at bedside on exam.  Patient endorses feeling much better after its reduction.    - Discussed what an inguinal hernia is and how it forms.  Discussed urgent indications for repair including incarceration or strangulation.  Patient knows what to monitor for and be aware of.  Patient at this time states that he is feeling better and  would like to go home and follow up in clinic to discuss further about inguinal hernia repair.  - Patient to call the clinic or return to the ED if developing any signs of incarceration or strangulation.  Patient is in agreement with stated plan and will reach out for any significant changes in the interim.      Thank you for your consult.  Will have patient follow up in clinic.    Mj Rich MD  General Surgery  [facility] - Emergency Dept

## 2022-09-19 NOTE — ED TRIAGE NOTES
"Diego Gunter, a 82 y.o. male presents to the ED   Triage note:  Chief Complaint   Patient presents with    Groin Pain     Pt c/o R-sided groin pain. Pt's grandson at bedside and states that the area is swollen to the size of a "baseball" and tender to the touch. Pt got an MRI of area a few days ago and is scheduled for colonoscopy in 2 weeks.      He c/o groin pain x a few days.     Review of patient's allergies indicates:  No Known Allergies  Past Medical History:   Diagnosis Date    Antiphospholipid syndrome 11/19/2021    Dysphagia     Hx of colonic polyps     followed by GI. Dr. Pham    Hyperlipidemia LDL goal <100     Overweight(278.02)     Prostate cancer september 2011    followed by urology, Dr. Rogers    Type II or unspecified type diabetes mellitus without mention of complication, not stated as uncontrolled     diet controlled      "

## 2022-09-19 NOTE — ED PROVIDER NOTES
"Encounter Date: 9/18/2022       History     Chief Complaint   Patient presents with    Groin Pain     Pt c/o R-sided groin pain. Pt's grandson at bedside and states that the area is swollen to the size of a "baseball" and tender to the touch. Pt got an MRI of area a few days ago and is scheduled for colonoscopy in 2 weeks.      82-year-old male with a history of colon polyps and mild cognitive impairment presents with a chief complaint of a right groin mass.  The patient's son who is at bedside said that the patient called him earlier today saying that he noticed the mass in his right groin that was new have a home and causing him some discomfort.  Son says that he thinks that the mass has been there for about 4 hours and has been slowly enlarging.  The patient sent denies that patient has a history of hernias, he says that he is due for colonoscopy in 2 weeks.  He denies any recent illnesses, and the patient denies any recent trauma, nausea, vomiting, diarrhea, chest pain and says that he last ate a few hours ago.    Review of patient's allergies indicates:  No Known Allergies  Past Medical History:   Diagnosis Date    Antiphospholipid syndrome 11/19/2021    Dysphagia     Hx of colonic polyps     followed by GI. Dr. Pham    Hyperlipidemia LDL goal <100     Overweight(278.02)     Prostate cancer september 2011    followed by urology, Dr. Rogers    Type II or unspecified type diabetes mellitus without mention of complication, not stated as uncontrolled     diet controlled     Past Surgical History:   Procedure Laterality Date    BRONCHOSCOPY N/A 10/15/2018    Procedure: BRONCHOSCOPY;  Surgeon: Pratibha Diagnostic Provider;  Location: [facility] OR [address] [place] [address];  Service: Anesthesiology;  Laterality: N/A;    CATARACT EXTRACTION, BILATERAL  2014    POSTERIOR CERVICAL LAMINECTOMY Bilateral 4/28/2022    Procedure: LAMINECTOMY, SPINE, CERVICAL, POSTERIOR APPROACH C3-6;  Surgeon: Carlos Miller MD;  Location: [facility] OR [address] [place] [address];  " Service: Neurosurgery;  Laterality: Bilateral;    PROSTATECTOMY       Family History   Problem Relation Age of Onset    Colon cancer Mother     Diabetes Mother     Heart disease Father     Mental illness Sister     Diabetes Sister     Other Brother         polio as a child    Pulmonary fibrosis Brother     Diabetes Brother     Myasthenia gravis Brother     Parkinsonism Brother     Diabetes Brother     Dementia Brother     Lung cancer Brother         smoker    Diabetes Maternal Aunt     Diabetes Other     Esophageal cancer Neg Hx      Social History     Tobacco Use    Smoking status: Former     Packs/day: 0.25     Years: 5.00     Pack years: 1.25     Types: Cigarettes     Quit date: 10/2/1962     Years since quittin.0    Smokeless tobacco: Former    Tobacco comments:     quit age 23   Substance Use Topics    Alcohol use: Not Currently    Drug use: No     Review of Systems   Constitutional:  Negative for chills and fever.   HENT:  Negative for congestion and sore throat.    Eyes:  Negative for visual disturbance.   Respiratory:  Negative for shortness of breath.    Cardiovascular:  Negative for chest pain and leg swelling.   Gastrointestinal:  Negative for abdominal pain, constipation, diarrhea, nausea and vomiting.   Genitourinary:  Negative for difficulty urinating, dysuria and flank pain.   Musculoskeletal:  Negative for back pain, gait problem, neck pain and neck stiffness.   Skin:  Negative for pallor and rash.   Neurological:  Negative for dizziness, syncope, weakness and numbness.   Psychiatric/Behavioral:  Negative for confusion and dysphoric mood.      Physical Exam     Initial Vitals [22 2156]   BP Pulse Resp Temp SpO2   (!) 144/70 60 20 97.5 °F (36.4 °C) 96 %      MAP       --         Physical Exam    Constitutional: He appears well-developed and well-nourished.   HENT:   Head: Normocephalic and atraumatic.   Eyes: EOM are normal. Pupils are equal, round, and reactive to light.   Neck: Neck  supple.   Normal range of motion.  Cardiovascular:  Normal rate, regular rhythm, S1 normal, normal heart sounds and normal pulses.           Pulmonary/Chest: Breath sounds normal. He has no wheezes. He has no rhonchi. He has no rales.   Abdominal: Bowel sounds are normal. He exhibits mass. He exhibits no distension. There is abdominal tenderness.   The patient is tender from the right upper quadrant to the right lower quadrant with guarding and there is a 6 cm round mass immediately to the right of the patient's pubic symphysis.  The mass is tender to moderate palpation, and is irreducible.  There is mild erythema with skin overlying the mass. There is guarding. There is no rebound.   Genitourinary:    Penis normal.      Genitourinary Comments: Both testicles are present in the scrotal sac and nontender, bilateral spermatic cords nontender, cremasteric reflex intact bilaterally     Musculoskeletal:         General: No tenderness or edema. Normal range of motion.      Cervical back: Normal range of motion and neck supple.     Neurological: He is alert and oriented to person, place, and time. He has normal strength. GCS score is 15. GCS eye subscore is 4. GCS verbal subscore is 5. GCS motor subscore is 6.   Skin: Skin is warm, dry and intact. Capillary refill takes less than 2 seconds.   Psychiatric: He has a normal mood and affect. His speech is normal and behavior is normal. Judgment and thought content normal. Cognition and memory are normal.       ED Course   Procedures  Labs Reviewed   CBC W/ AUTO DIFFERENTIAL - Abnormal; Notable for the following components:       Result Value    Hemoglobin 13.3 (*)     MCV 81 (*)     MCH 26.9 (*)     RDW 15.0 (*)     Mono # 1.1 (*)     All other components within normal limits   URINALYSIS, REFLEX TO URINE CULTURE - Abnormal; Notable for the following components:    Nitrite, UA Positive (*)     Leukocytes, UA 3+ (*)     All other components within normal limits    Narrative:      Specimen Source->Urine   URINALYSIS MICROSCOPIC - Abnormal; Notable for the following components:    WBC, UA 52 (*)     All other components within normal limits    Narrative:     Specimen Source->Urine   CULTURE, URINE   COMPREHENSIVE METABOLIC PANEL   PROTIME-INR   LACTIC ACID, PLASMA          Imaging Results               CT Abdomen Pelvis With Contrast (Final result)  Result time 09/19/22 02:44:36      Final result by Leo Nugent MD (09/19/22 02:44:36)                   Impression:      Small bowel, mesentery, and fluid containing right inguinal hernia with partial upstream small bowel obstruction and swirling of the mesenteric vessels concerning for strangulated hernia.    Diverticulosis without evidence of acute diverticulitis.    Cholelithiasis without evidence of acute cholecystitis.    Mild nodular contour of the liver which may suggest chronic liver disease.    Bibasilar pulmonary emphysema.    Additional findings as above.    This report was flagged in Epic as abnormal.    Electronically signed by resident: Uma Nelson  Date:    09/19/2022  Time:    02:19    Electronically signed by: Leo Nugent MD  Date:    09/19/2022  Time:    02:44               Narrative:    EXAMINATION:  CT ABDOMEN PELVIS WITH CONTRAST    CLINICAL HISTORY:  RLQ abdominal pain (Age >= 14y);R inguinal/direct hernia, non-reduceable;    TECHNIQUE:  Low dose axial images, sagittal and coronal reformations were obtained from the lung bases to the pubic symphysis following the IV administration of 75 mL of Omnipaque 350 .  Oral contrast was not administered.    COMPARISON:  CT abdomen pelvis 09/13/2022 retroperitoneal ultrasound 07/05/2022    FINDINGS:  Heart: Normal in size. No pericardial effusion.    Lung Bases: Moderate emphysematous changes of the lung bases.  No consolidation.  No pleural effusion.    Liver: Slight nodular contour of the hepatic parenchyma, which can be seen in cirrhosis.  Normal size and overall  attenuation.  No focal hepatic lesions.    Gallbladder: Cholelithiasis.  No pericholecystic fluid or wall thickening.    Bile Ducts: No intra extrahepatic biliary ductal dilatation.    Pancreas: No mass or peripancreatic fat stranding.    Spleen: Normal size and overall attenuation.  Small accessory spleen.    Adrenals: Unremarkable.    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  Kidneys are normal in size and location.  No mass or hydroureteronephrosis.  No nephroureterolithiasis.    Bladder: Distended without evidence of wall thickening.    Reproductive organs: The prostate is surgically absent.    GI Tract/Mesentery: Prominence of fluid-filled distal small bowel loops noting a right inguinal hernia containing a short segment of small bowel and mesentery with swirling of the mesenteric vessels proximally.  Small bowel measures up to 2.5 cm in diameter.  There is adjacent mesenteric edema and trace free fluid adjacent to distended small bowel.  No small bowel wall hypoenhancement.  Moderate amount of stool within the colon.  Colon is not significantly decompressed.  Diverticulosis coli.  Appendix is normal.    Peritoneal Space: Trace abdominopelvic free fluid.  No free air.  No portal venous gas.    Retroperitoneum: No significant adenopathy.    Abdominal wall: Small bowel and mesentery containing right inguinal hernia with surrounding fluid.    Vasculature: No significant atherosclerosis or aneurysm.    Bones: Degenerative changes of the spine including grade 1 retrolisthesis of L4 on L5 and L5-S1.  Disc bulge with central canal stenosis and neural foraminal narrowing at L4-L5.  Mildly demineralized bones.  No acute fracture.  No osseous destructive lesions.                                       Medications   morphine injection 4 mg (4 mg Intravenous Given 9/19/22 0120)   lactated ringers bolus 1,000 mL (0 mLs Intravenous Stopped 9/19/22 0312)   iohexoL  (OMNIPAQUE 350) injection 75 mL (75 mLs Intravenous Given 9/19/22 0159)     Medical Decision Making:   History:   Old Medical Records: I decided to obtain old medical records.  Old Records Summarized: records from clinic visits and records from previous admission(s).       <> Summary of Records: Prior records reviewed for past medical history and current medications  Initial Assessment:   82-year-old male with presentation most concerning for right inguinal incarcerated hernia.  Testicular exam was unremarkable.  I treated the patient's pain.    Ddx: Strangulated inguinal hernia, incarcerated inguinal hernia, hematoma, trauma     The patient says he had a regular bowel movement today and has been tolerating p.o. intake, I have low concern for intra-abdominal pathology.      Independently Interpreted Test(s):   I have ordered and independently interpreted EKG Reading(s) - see summary below  Clinical Tests:   Lab Tests: Reviewed  Radiological Study: Reviewed  Medical Tests: Reviewed  ED Management:  CT scan showed inguinal mass concerning for strangulated hernia.  I consulted surgery who came and saw the patient was able to reduce the hernia with recommendations for the patient to follow up in clinic.    I reexamined the patient and the hernia remains reduced there is no pain at the site.    I reinforced the education provided by surgery to the patient, discussing strict return precautions if the hernia is not reducible at home, nausea, vomiting, intractable pain.  The patient and his son verbalized understanding.  The patient was discharged home with follow-up with surgery.           ED Course as of 09/19/22 0845   Mon Sep 19, 2022   0134 NITRITE UA(!): Positive [BP]      ED Course User Index  [BP] Carlo nUderwood MD                 Clinical Impression:   Final diagnoses:  [R10.9] Abdominal pain  [K40.90] Right inguinal hernia (Primary)        ED Disposition Condition    Discharge Stable          ED Prescriptions        Medication Sig Dispense Start Date End Date Auth. Provider    cefpodoxime (VANTIN) 200 MG tablet Take 1 tablet (200 mg total) by mouth 2 (two) times daily. for 10 days 20 tablet 9/19/2022 9/29/2022 Carlo Underwood MD          Follow-up Information       Follow up With Specialties Details Why Contact Info    Dario Esquivel MD General Surgery Follow up in 1 week(s) F/U on right inguinal hernia [facility] [address]  [phone]               Carlo Underwood MD  Resident  09/19/22 0901

## 2022-09-20 LAB — BACTERIA UR CULT: ABNORMAL

## 2022-09-21 ENCOUNTER — TELEPHONE (OUTPATIENT)
Dept: FAMILY MEDICINE | Facility: CLINIC | Age: 82
End: 2022-09-21
Payer: MEDICARE

## 2022-09-21 ENCOUNTER — OFFICE VISIT (OUTPATIENT)
Dept: SURGERY | Facility: CLINIC | Age: 82
End: 2022-09-21
Payer: MEDICARE

## 2022-09-21 VITALS
HEIGHT: 69 IN | OXYGEN SATURATION: 98 % | DIASTOLIC BLOOD PRESSURE: 67 MMHG | SYSTOLIC BLOOD PRESSURE: 137 MMHG | BODY MASS INDEX: 23.84 KG/M2 | WEIGHT: 160.94 LBS | HEART RATE: 58 BPM

## 2022-09-21 DIAGNOSIS — K40.90 NON-RECURRENT UNILATERAL INGUINAL HERNIA WITHOUT OBSTRUCTION OR GANGRENE: Primary | ICD-10-CM

## 2022-09-21 DIAGNOSIS — K40.90 RIGHT INGUINAL HERNIA: ICD-10-CM

## 2022-09-21 PROCEDURE — 99999 PR PBB SHADOW E&M-EST. PATIENT-LVL V: ICD-10-PCS | Mod: PBBFAC,,, | Performed by: STUDENT IN AN ORGANIZED HEALTH CARE EDUCATION/TRAINING PROGRAM

## 2022-09-21 PROCEDURE — 99999 PR PBB SHADOW E&M-EST. PATIENT-LVL V: CPT | Mod: PBBFAC,,, | Performed by: STUDENT IN AN ORGANIZED HEALTH CARE EDUCATION/TRAINING PROGRAM

## 2022-09-21 PROCEDURE — 3078F PR MOST RECENT DIASTOLIC BLOOD PRESSURE < 80 MM HG: ICD-10-PCS | Mod: CPTII,S$GLB,, | Performed by: STUDENT IN AN ORGANIZED HEALTH CARE EDUCATION/TRAINING PROGRAM

## 2022-09-21 PROCEDURE — 3288F FALL RISK ASSESSMENT DOCD: CPT | Mod: CPTII,S$GLB,, | Performed by: STUDENT IN AN ORGANIZED HEALTH CARE EDUCATION/TRAINING PROGRAM

## 2022-09-21 PROCEDURE — 1160F RVW MEDS BY RX/DR IN RCRD: CPT | Mod: CPTII,S$GLB,, | Performed by: STUDENT IN AN ORGANIZED HEALTH CARE EDUCATION/TRAINING PROGRAM

## 2022-09-21 PROCEDURE — 1157F PR ADVANCE CARE PLAN OR EQUIV PRESENT IN MEDICAL RECORD: ICD-10-PCS | Mod: CPTII,S$GLB,, | Performed by: STUDENT IN AN ORGANIZED HEALTH CARE EDUCATION/TRAINING PROGRAM

## 2022-09-21 PROCEDURE — 3075F SYST BP GE 130 - 139MM HG: CPT | Mod: CPTII,S$GLB,, | Performed by: STUDENT IN AN ORGANIZED HEALTH CARE EDUCATION/TRAINING PROGRAM

## 2022-09-21 PROCEDURE — 1125F PR PAIN SEVERITY QUANTIFIED, PAIN PRESENT: ICD-10-PCS | Mod: CPTII,S$GLB,, | Performed by: STUDENT IN AN ORGANIZED HEALTH CARE EDUCATION/TRAINING PROGRAM

## 2022-09-21 PROCEDURE — 99204 OFFICE O/P NEW MOD 45 MIN: CPT | Mod: S$GLB,,, | Performed by: STUDENT IN AN ORGANIZED HEALTH CARE EDUCATION/TRAINING PROGRAM

## 2022-09-21 PROCEDURE — 3075F PR MOST RECENT SYSTOLIC BLOOD PRESS GE 130-139MM HG: ICD-10-PCS | Mod: CPTII,S$GLB,, | Performed by: STUDENT IN AN ORGANIZED HEALTH CARE EDUCATION/TRAINING PROGRAM

## 2022-09-21 PROCEDURE — 1160F PR REVIEW ALL MEDS BY PRESCRIBER/CLIN PHARMACIST DOCUMENTED: ICD-10-PCS | Mod: CPTII,S$GLB,, | Performed by: STUDENT IN AN ORGANIZED HEALTH CARE EDUCATION/TRAINING PROGRAM

## 2022-09-21 PROCEDURE — 1125F AMNT PAIN NOTED PAIN PRSNT: CPT | Mod: CPTII,S$GLB,, | Performed by: STUDENT IN AN ORGANIZED HEALTH CARE EDUCATION/TRAINING PROGRAM

## 2022-09-21 PROCEDURE — 1157F ADVNC CARE PLAN IN RCRD: CPT | Mod: CPTII,S$GLB,, | Performed by: STUDENT IN AN ORGANIZED HEALTH CARE EDUCATION/TRAINING PROGRAM

## 2022-09-21 PROCEDURE — 1159F PR MEDICATION LIST DOCUMENTED IN MEDICAL RECORD: ICD-10-PCS | Mod: CPTII,S$GLB,, | Performed by: STUDENT IN AN ORGANIZED HEALTH CARE EDUCATION/TRAINING PROGRAM

## 2022-09-21 PROCEDURE — 1159F MED LIST DOCD IN RCRD: CPT | Mod: CPTII,S$GLB,, | Performed by: STUDENT IN AN ORGANIZED HEALTH CARE EDUCATION/TRAINING PROGRAM

## 2022-09-21 PROCEDURE — 3078F DIAST BP <80 MM HG: CPT | Mod: CPTII,S$GLB,, | Performed by: STUDENT IN AN ORGANIZED HEALTH CARE EDUCATION/TRAINING PROGRAM

## 2022-09-21 PROCEDURE — 1101F PT FALLS ASSESS-DOCD LE1/YR: CPT | Mod: CPTII,S$GLB,, | Performed by: STUDENT IN AN ORGANIZED HEALTH CARE EDUCATION/TRAINING PROGRAM

## 2022-09-21 PROCEDURE — 1101F PR PT FALLS ASSESS DOC 0-1 FALLS W/OUT INJ PAST YR: ICD-10-PCS | Mod: CPTII,S$GLB,, | Performed by: STUDENT IN AN ORGANIZED HEALTH CARE EDUCATION/TRAINING PROGRAM

## 2022-09-21 PROCEDURE — 3288F PR FALLS RISK ASSESSMENT DOCUMENTED: ICD-10-PCS | Mod: CPTII,S$GLB,, | Performed by: STUDENT IN AN ORGANIZED HEALTH CARE EDUCATION/TRAINING PROGRAM

## 2022-09-21 PROCEDURE — 99204 PR OFFICE/OUTPT VISIT, NEW, LEVL IV, 45-59 MIN: ICD-10-PCS | Mod: S$GLB,,, | Performed by: STUDENT IN AN ORGANIZED HEALTH CARE EDUCATION/TRAINING PROGRAM

## 2022-09-21 RX ORDER — SODIUM CHLORIDE 9 MG/ML
INJECTION, SOLUTION INTRAVENOUS CONTINUOUS
Status: CANCELLED | OUTPATIENT
Start: 2022-09-21

## 2022-09-21 NOTE — TELEPHONE ENCOUNTER
----- Message from Portia Ferreira MD sent at 9/20/2022  5:31 PM CDT -----  Please schedule patient for visit with myself or another provider for follow up a few days after he completes his antibiotics.  ----- Message -----  From: Stephanie Hood PA-C  Sent: 9/20/2022   5:18 PM CDT  To: Portia Ferreira MD    Culture growing CoNS- according to literature, some evidence of resistance with cephalosporin class. No susceptibilities ran.    Recommend re-eval with PCP/repeat UA after antibiotics complete. Called patient to discuss- spoke with wife. No UTI s/s. Recommendations made, also CC'ed PCP.

## 2022-09-21 NOTE — TELEPHONE ENCOUNTER
Informed patient wife to have patient call to schedule appointment when he completed antibiotics, patient wife states patient just started taking medication yesterday and will call when completed.

## 2022-09-21 NOTE — PROGRESS NOTES
[facility]  General Surgery  History & Physical  Date: 09/21/2022  Referring Provider: Mj Rich    SUBJECTIVE:     Chief complaint:   Chief Complaint   Patient presents with    Consult       History of Present Illness:  Patient is a 82 y.o. male with a history of HLD, prostate cancer, DM2, MAC on treatment and epidural hematoma s/p evacuation who was seen in the ED a few days ago for right groin pain. At the time, he stated that he has had issues with constipation and was being worked up by GI for this issue.  He came to the ED with new onset right groin pain that began around 9pm on 9/18/22.  He noted a bulge there that was initially not reducible. His work up in the ED revealed that he had a UTI for which he is on antibiotics for for a total of 10 days. CT imaging performed which showed a right inguinal hernia that contained small bowel with some associated proximal small bowel dilation, and edema concerning for a strangulated right inguinal hernia. On exam, there was no erythema or edema and the bulge was reduced in the ED. He presents today for follow up. He has noticed some pain today but the bulge remains reducible. No nausea or vomiting. He is able to do his ADLs without issues.    He is a non-smoker and is not on any anticoagulation.    Review of patient's allergies indicates:  No Known Allergies    Current Outpatient Medications   Medication Sig Dispense Refill    b complex vitamins capsule Take 1 capsule by mouth once daily.      cefpodoxime (VANTIN) 200 MG tablet Take 1 tablet (200 mg total) by mouth 2 (two) times daily. for 10 days 20 tablet 0    ethambutoL (MYAMBUTOL) 400 MG Tab Take 2 tablets (800 mg total) by mouth once daily. 60 tablet 5    nebulizer and compressor (OMBRA COMPRESSOR SYSTEM) Marcy use to administer nebulized medication as directed (Patient not taking: Reported on 9/8/2022) 1 each 0    omeprazole (PRILOSEC) 10 MG capsule ONE CAPSULE BY MOUTH once a day as  needed      polyethylene glycol (GOLYTELY) 236-22.74-6.74 -5.86 gram suspension Day 1: Dose 1, 4000ml. Day 2: Dose 2, 4000ml. For total of 8000ml. 8000 mL 0    pravastatin (PRAVACHOL) 10 MG tablet Take 1 tablet (10 mg total) by mouth once daily. 90 tablet 0    sodium chloride 3% 3 % nebulizer solution SMARTSIG:Via Inhaler      sodium chloride 3% 3 % nebulizer solution USE 4 ML VIAL IN NEBULIZER  TWICE DAILY 480 mL 11     No current facility-administered medications for this visit.       Past Medical History:   Diagnosis Date    Antiphospholipid syndrome 11/19/2021    Dysphagia     Hx of colonic polyps     followed by GI. Dr. Pham    Hyperlipidemia LDL goal <100     Overweight(278.02)     Prostate cancer september 2011    followed by urology, Dr. Rogers    Type II or unspecified type diabetes mellitus without mention of complication, not stated as uncontrolled     diet controlled     Past Surgical History:   Procedure Laterality Date    BRONCHOSCOPY N/A 10/15/2018    Procedure: BRONCHOSCOPY;  Surgeon: [place] [employer] Diagnostic Provider;  Location: [facility] OR [address] [place] [address];  Service: Anesthesiology;  Laterality: N/A;    CATARACT EXTRACTION, BILATERAL  2014    POSTERIOR CERVICAL LAMINECTOMY Bilateral 4/28/2022    Procedure: LAMINECTOMY, SPINE, CERVICAL, POSTERIOR APPROACH C3-6;  Surgeon: Carlos Miller MD;  Location: [facility] OR [address] [place] [address];  Service: Neurosurgery;  Laterality: Bilateral;    PROSTATECTOMY       Family History   Problem Relation Age of Onset    Colon cancer Mother     Diabetes Mother     Heart disease Father     Mental illness Sister     Diabetes Sister     Other Brother         polio as a child    Pulmonary fibrosis Brother     Diabetes Brother     Myasthenia gravis Brother     Parkinsonism Brother     Diabetes Brother     Dementia Brother     Lung cancer Brother         smoker    Diabetes Maternal Aunt     Diabetes Other     Esophageal cancer Neg Hx      Social History     Tobacco Use    Smoking status: Former      "Packs/day: 0.25     Years: 5.00     Pack years: 1.25     Types: Cigarettes     Quit date: 10/2/1962     Years since quittin.0    Smokeless tobacco: Former    Tobacco comments:     quit age 23   Substance Use Topics    Alcohol use: Not Currently    Drug use: No        Review of Systems:  Review of Systems   Constitutional:  Negative for activity change, fatigue and fever.   HENT:  Negative for congestion, rhinorrhea and trouble swallowing.    Eyes:  Negative for discharge.   Respiratory:  Negative for cough, chest tightness and shortness of breath.    Cardiovascular:  Negative for chest pain and palpitations.   Gastrointestinal:  Negative for anal bleeding, constipation, diarrhea, nausea and vomiting.   Endocrine: Negative for cold intolerance and heat intolerance.   Genitourinary:  Negative for decreased urine volume, difficulty urinating, dysuria and urgency.        Right groin pain   Musculoskeletal:  Negative for back pain, joint swelling and neck pain.   Skin:  Negative for color change and wound.   Neurological:  Negative for syncope, weakness and light-headedness.   Hematological:  Negative for adenopathy. Does not bruise/bleed easily.     OBJECTIVE:     Vital Signs (Most Recent)  Pulse: (!) 58 (22)  BP: 137/67 (22)  SpO2: 98 % (22)  5' 9" (1.753 m)  73 kg (160 lb 15 oz)     Physical Exam:  Physical Exam  Constitutional:       Appearance: Normal appearance. He is not ill-appearing or toxic-appearing.   HENT:      Head: Normocephalic and atraumatic.   Eyes:      General: Vision grossly intact.      Extraocular Movements: Extraocular movements intact.   Neck:      Trachea: Trachea and phonation normal.   Cardiovascular:      Rate and Rhythm: Normal rate and regular rhythm.      Heart sounds: Normal heart sounds.   Pulmonary:      Effort: Pulmonary effort is normal.      Breath sounds: Normal breath sounds. No decreased breath sounds.   Chest:      Chest wall: No mass. "   Abdominal:      General: Abdomen is flat. Bowel sounds are normal. There is no distension.      Palpations: Abdomen is soft. There is no mass.      Tenderness: There is no abdominal tenderness.      Hernia: A hernia is present. Hernia is present in the right inguinal area.      Comments: Reducible right inguinal bulge   Musculoskeletal:         General: No swelling or tenderness. Normal range of motion.      Cervical back: Normal range of motion and neck supple. No muscular tenderness.   Lymphadenopathy:      Cervical: No cervical adenopathy.      Lower Body: No right inguinal adenopathy. No left inguinal adenopathy.   Skin:     General: Skin is warm and dry.      Capillary Refill: Capillary refill takes less than 2 seconds.      Coloration: Skin is not jaundiced.      Findings: No bruising or erythema.   Neurological:      General: No focal deficit present.      Mental Status: He is alert and oriented to person, place, and time.   Psychiatric:         Mood and Affect: Mood normal.         Behavior: Behavior is cooperative.       Laboratory  NONE    Diagnostic Results:  None    ASSESSMENT/PLAN:   Diego Gunter is an 82 yr old M with a now reducible, right inguinal hernia that was previously incarcerated    PLAN:  -To OR on 10/10/22 for repair of right inguinal hernia. He is currently being treated for a urinary tract infection so we will after he completes that to proceed with surgery. He is also being treated for mycobacterium avium infection. He has had surgery during his treatment of this and since this procedure is semi-urgent we will proceed. He had a recent incarceration of his hernia so the likelihood it will happen again is high.  -Discussed risks and benefits of the surgery with him and his family member and they have agreed to proceed. Informed consent obtained.  -Preop orders placed      Dario Esquivel MD  General Surgery and Surgical Critical Care  [facility]

## 2022-09-21 NOTE — H&P (VIEW-ONLY)
[facility]  General Surgery  History & Physical  Date: 09/21/2022  Referring Provider: Mj Rich    SUBJECTIVE:     Chief complaint:   Chief Complaint   Patient presents with    Consult       History of Present Illness:  Patient is a 82 y.o. male with a history of HLD, prostate cancer, DM2, MAC on treatment and epidural hematoma s/p evacuation who was seen in the ED a few days ago for right groin pain. At the time, he stated that he has had issues with constipation and was being worked up by GI for this issue.  He came to the ED with new onset right groin pain that began around 9pm on 9/18/22.  He noted a bulge there that was initially not reducible. His work up in the ED revealed that he had a UTI for which he is on antibiotics for for a total of 10 days. CT imaging performed which showed a right inguinal hernia that contained small bowel with some associated proximal small bowel dilation, and edema concerning for a strangulated right inguinal hernia. On exam, there was no erythema or edema and the bulge was reduced in the ED. He presents today for follow up. He has noticed some pain today but the bulge remains reducible. No nausea or vomiting. He is able to do his ADLs without issues.    He is a non-smoker and is not on any anticoagulation.    Review of patient's allergies indicates:  No Known Allergies    Current Outpatient Medications   Medication Sig Dispense Refill    b complex vitamins capsule Take 1 capsule by mouth once daily.      cefpodoxime (VANTIN) 200 MG tablet Take 1 tablet (200 mg total) by mouth 2 (two) times daily. for 10 days 20 tablet 0    ethambutoL (MYAMBUTOL) 400 MG Tab Take 2 tablets (800 mg total) by mouth once daily. 60 tablet 5    nebulizer and compressor (OMBRA COMPRESSOR SYSTEM) Marcy use to administer nebulized medication as directed (Patient not taking: Reported on 9/8/2022) 1 each 0    omeprazole (PRILOSEC) 10 MG capsule ONE CAPSULE BY MOUTH once a day as  needed      polyethylene glycol (GOLYTELY) 236-22.74-6.74 -5.86 gram suspension Day 1: Dose 1, 4000ml. Day 2: Dose 2, 4000ml. For total of 8000ml. 8000 mL 0    pravastatin (PRAVACHOL) 10 MG tablet Take 1 tablet (10 mg total) by mouth once daily. 90 tablet 0    sodium chloride 3% 3 % nebulizer solution SMARTSIG:Via Inhaler      sodium chloride 3% 3 % nebulizer solution USE 4 ML VIAL IN NEBULIZER  TWICE DAILY 480 mL 11     No current facility-administered medications for this visit.       Past Medical History:   Diagnosis Date    Antiphospholipid syndrome 11/19/2021    Dysphagia     Hx of colonic polyps     followed by GI. Dr. Pham    Hyperlipidemia LDL goal <100     Overweight(278.02)     Prostate cancer september 2011    followed by urology, Dr. Rogers    Type II or unspecified type diabetes mellitus without mention of complication, not stated as uncontrolled     diet controlled     Past Surgical History:   Procedure Laterality Date    BRONCHOSCOPY N/A 10/15/2018    Procedure: BRONCHOSCOPY;  [facility];  Location: [facility] OR [address] [place] [address];  Service: Anesthesiology;  Laterality: N/A;    CATARACT EXTRACTION, BILATERAL  2014    POSTERIOR CERVICAL LAMINECTOMY Bilateral 4/28/2022    Procedure: LAMINECTOMY, SPINE, CERVICAL, POSTERIOR APPROACH C3-6;  Surgeon: Carlos Miller MD;  Location: [facility] OR [address] [place] [address];  Service: Neurosurgery;  Laterality: Bilateral;    PROSTATECTOMY       Family History   Problem Relation Age of Onset    Colon cancer Mother     Diabetes Mother     Heart disease Father     Mental illness Sister     Diabetes Sister     Other Brother         polio as a child    Pulmonary fibrosis Brother     Diabetes Brother     Myasthenia gravis Brother     Parkinsonism Brother     Diabetes Brother     Dementia Brother     Lung cancer Brother         smoker    Diabetes Maternal Aunt     Diabetes Other     Esophageal cancer Neg Hx      Social History     Tobacco Use    Smoking status: Former      "Packs/day: 0.25     Years: 5.00     Pack years: 1.25     Types: Cigarettes     Quit date: 10/2/1962     Years since quittin.0    Smokeless tobacco: Former    Tobacco comments:     quit age 23   Substance Use Topics    Alcohol use: Not Currently    Drug use: No        Review of Systems:  Review of Systems   Constitutional:  Negative for activity change, fatigue and fever.   HENT:  Negative for congestion, rhinorrhea and trouble swallowing.    Eyes:  Negative for discharge.   Respiratory:  Negative for cough, chest tightness and shortness of breath.    Cardiovascular:  Negative for chest pain and palpitations.   Gastrointestinal:  Negative for anal bleeding, constipation, diarrhea, nausea and vomiting.   Endocrine: Negative for cold intolerance and heat intolerance.   Genitourinary:  Negative for decreased urine volume, difficulty urinating, dysuria and urgency.        Right groin pain   Musculoskeletal:  Negative for back pain, joint swelling and neck pain.   Skin:  Negative for color change and wound.   Neurological:  Negative for syncope, weakness and light-headedness.   Hematological:  Negative for adenopathy. Does not bruise/bleed easily.     OBJECTIVE:     Vital Signs (Most Recent)  Pulse: (!) 58 (22)  BP: 137/67 (22)  SpO2: 98 % (22)  5' 9" (1.753 m)  73 kg (160 lb 15 oz)     Physical Exam:  Physical Exam  Constitutional:       Appearance: Normal appearance. He is not ill-appearing or toxic-appearing.   HENT:      Head: Normocephalic and atraumatic.   Eyes:      General: Vision grossly intact.      Extraocular Movements: Extraocular movements intact.   Neck:      Trachea: Trachea and phonation normal.   Cardiovascular:      Rate and Rhythm: Normal rate and regular rhythm.      Heart sounds: Normal heart sounds.   Pulmonary:      Effort: Pulmonary effort is normal.      Breath sounds: Normal breath sounds. No decreased breath sounds.   Chest:      Chest wall: No mass. "   Abdominal:      General: Abdomen is flat. Bowel sounds are normal. There is no distension.      Palpations: Abdomen is soft. There is no mass.      Tenderness: There is no abdominal tenderness.      Hernia: A hernia is present. Hernia is present in the right inguinal area.      Comments: Reducible right inguinal bulge   Musculoskeletal:         General: No swelling or tenderness. Normal range of motion.      Cervical back: Normal range of motion and neck supple. No muscular tenderness.   Lymphadenopathy:      Cervical: No cervical adenopathy.      Lower Body: No right inguinal adenopathy. No left inguinal adenopathy.   Skin:     General: Skin is warm and dry.      Capillary Refill: Capillary refill takes less than 2 seconds.      Coloration: Skin is not jaundiced.      Findings: No bruising or erythema.   Neurological:      General: No focal deficit present.      Mental Status: He is alert and oriented to person, place, and time.   Psychiatric:         Mood and Affect: Mood normal.         Behavior: Behavior is cooperative.       Laboratory  NONE    Diagnostic Results:  None    ASSESSMENT/PLAN:   Diego Gunter is an 82 yr old M with a now reducible, right inguinal hernia that was previously incarcerated    PLAN:  -To OR on 10/10/22 for repair of right inguinal hernia. He is currently being treated for a urinary tract infection so we will after he completes that to proceed with surgery. He is also being treated for mycobacterium avium infection. He has had surgery during his treatment of this and since this procedure is semi-urgent we will proceed. He had a recent incarceration of his hernia so the likelihood it will happen again is high.  -Discussed risks and benefits of the surgery with him and his family member and they have agreed to proceed. Informed consent obtained.  -Preop orders placed      Dario Esquivel MD  General Surgery and Surgical Critical Care  [facility]

## 2022-09-26 ENCOUNTER — DOCUMENTATION ONLY (OUTPATIENT)
Dept: REHABILITATION | Facility: HOSPITAL | Age: 82
End: 2022-09-26
Payer: MEDICARE

## 2022-09-26 ENCOUNTER — CLINICAL SUPPORT (OUTPATIENT)
Dept: REHABILITATION | Facility: HOSPITAL | Age: 82
End: 2022-09-26
Payer: MEDICARE

## 2022-09-26 DIAGNOSIS — R53.1 FUNCTIONAL WEAKNESS: Primary | ICD-10-CM

## 2022-09-26 PROCEDURE — 97110 THERAPEUTIC EXERCISES: CPT

## 2022-09-26 PROCEDURE — 97140 MANUAL THERAPY 1/> REGIONS: CPT

## 2022-09-26 NOTE — PROGRESS NOTES
Occupational Therapy Daily Treatment Note   Date 9/26/2022    Name: Diego TIRADO [facility] Number: [id number]      Therapy Diagnosis:  Right UE weakness , sp laminectomy and stroke   No diagnosis found.      Physician: Carlos Miller MD       Physician Orders: Z98.890 (ICD-10-CM) - Status post laminectomy   eval and treat   Medical Diagnosis: Z98.890 (ICD-10-CM) - Status post laminectomy      Surgical Procedure/ Date :cervical laminectomy 4/28/22 and spinal cord bleed   Mechanism of Injury: Diego reports: Pt with pain beginning at 2:30 am on 4/28/2022; decorticate posturing up upper extremity and unable to move bilateral lower extremities. Pt was rushing to the ER; Pt's wife states MD states he had bleeding from his head into his spine. 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   Pt stayed in hospital for 16 days then was transferred to [facility] for rehabilitation for 2 months (OT, PT, speech therapy). Pt was discharged and received OT/PT Home Health for 1 month. Pt biggest complaints involves hand movements, elbow extension, AROM of shoulder and ambulation. Pt only with complaints of pain from shoulder to hand (7/10) - describes as burning pain. Pt wife states pt struggling with short term memory and speech due to dysphagia after surgery.   Evaluation Date: 8/11/2022  Insurance:[employer]   Insurance Authorization period Expiration: 9/8/22   Plan of Care Certification Period: 10/31/22   -      Visit # / Visits Authortized: 12/ 11  Requested new order for more visits  Time In: 9:00 am   Time Out: 10:00   am    Total Billable Time:   60  minutes     Precautions: Diabetes    Subjective       Pt reports: mild pain, just hard to move and swelling     he was compliant with home exercise program given last session.  Response to previous treatment:increased use of right hand   Functional change: none noted yet     Pain: 0/10  Location: right UE  "    Objective       Diego received the following supervised modalities after being cleared for contradictions for 10 minutes:   -moist heat right shoulder  Fluidotherapy: To R hand for 10 min, continuous air, 110 deg, air speed 100 to decrease pain, edema & scar tissue and increased tissue extensibility.        Diego received  Manual  Therapy   for 15  minutes including: in supine   - STM and retrograde massage right hand, forearm   scap mobs and PROM shoulder flex/Abd/ext   sidelying       PROM shoulder all ways supine  and PROM elbow and wrist  flexion/ extension,  X 10 reps   And in sidelying shoulder flexion, scaption with manual  scapula support      There ex X 35 minutes    NT:UBE L1 FW/BW x3 min ea way  Supine AROM in Scaption, ER    Standing ball on wall alphabet  , 1 set , with tactile cue with scapula to retraction  Prone on mat scapula " T " and " I " 30 reps    Prone on mat rows  2#  30 reps   Elbow flexion 2# flex/ ext  30 reps   Green flex bar sup/pro 20 reps    WB'ing Towel slides  with 3# in standing  in flexion x20         Yellow t band shoulder rows and  And shoulder extension 30 reps ( at home )       Home Exercises and Education Provided     Education provided:   - continue with HEP   - Progress towards goals     Written Home Exercises Provided: Patient instructed to cont prior HEP.    Exercises were reviewed and Diego was able to demonstrate them prior to the end of the session.  Diego demonstrated good  understanding of the education provided.   .   See EMR under Patient Instructions for exercises provided prior visit.       Assessment     Pt would continue to benefit from skilled OT.  Yes    Pt. Able to make a functional fist after therapy. Pt prognosis is Good. Patient would benefit from continued OT services. Requested new order for authorization.  Objectives to be measured by primary therapist for reliability.    Pt will continue to benefit from skilled outpatient occupational therapy " to address the deficits listed in the problem list on initial evaluation provide pt/family education and to maximize pt's level of independence in the home and community environment.     Anticipated barriers to occupational therapy:  None     Pt's spiritual, cultural and educational needs considered and pt agreeable to plan of care and goals.      GOALS: 12 weeks. Pt agrees with goals set.     Short Term (6 weeks on 9/30/22 ) .     1)   Patient to be IND with HEP and modalities for pain management, progressing   2)   Increase ROM elbow extension and shoulder flexion, ex , scaption 10-15 degrees  For  Right UE  motion to increase functional hand use for right hand , progressing   3)   Increase  strength by  10 lbs. to grasp right hand , progressing   4)   Increase pinch 1-3 psis for  5, progressing   5)   Decrease edema .1-2cm to increase joint mobility /flexibility for improved overall functional hand use. , progressing   6)   Increase pinch 1-3 psi's to increase IND with button and FM Coordination.     Long Term (by discharge):  1)   Pt will report 0 out of 10 pain with right UE ., progressing   2)   Patient to score of 30% on FOTO to demonstrate improved perception of functional right hand/ arm  Use. Progressing   3)   Pt will return to prior level of function for ADLs and household management, progressing         Plan   Certification Period/Plan of care expiration: 8/11/2022 to  10/31/22 .     Discussed Plan of Care with patient: Yes  Updates/Grading for next session:  Yes       Smitha Quick OTR/L, CHT

## 2022-09-26 NOTE — PROGRESS NOTES
Pt was not seen today in PT due to active inguinal hernia.  He is not to strain until he has the hernia repaired.  Hold PT at this time until further notice from physician regarding the hernia repair.

## 2022-09-30 ENCOUNTER — PATIENT MESSAGE (OUTPATIENT)
Dept: FAMILY MEDICINE | Facility: CLINIC | Age: 82
End: 2022-09-30
Payer: MEDICARE

## 2022-09-30 ENCOUNTER — TELEPHONE (OUTPATIENT)
Dept: FAMILY MEDICINE | Facility: CLINIC | Age: 82
End: 2022-09-30
Payer: MEDICARE

## 2022-09-30 DIAGNOSIS — M79.643 PAIN OF HAND, UNSPECIFIED LATERALITY: Primary | ICD-10-CM

## 2022-09-30 NOTE — TELEPHONE ENCOUNTER
----- Message from Ansley Tang sent at 9/30/2022 10:59 AM CDT -----  Type: Patient Call Back    Who called:Wife/Addis    What is the request in detail: Requesting a referral to a ortho Dr that works with hands. Please call to speak with wife.     Can the clinic reply by [facility]? no    Would the patient rather a call back or a response via [facility]? Call back    Best call back number: [phone] (home)       Additional Information:

## 2022-09-30 NOTE — TELEPHONE ENCOUNTER
Called and spoke with patient wife (COLTEN), states patient had surgery in April on his hand. Wife states patient hand has been hurting and swelling. Patient went to the Er on 09/16. Patient hand was swollen and fever hot, patient had an low grade temp. Patient has been going to PT and OT but states his hand are still in pain and swelling. Patient states his hand has been tingling. Patient wife states it is his right hand. States he does not use the right hand because its swollen and hurting. Patient uses his right hand for everything and now patient can't use it. Wife states patient can not use  anything with his right hand. Wife states on his right shoulder his shoulder blade is sticking out and is going to PT for these issues. Patient wife states nothing is working and she is scared for her .  Please advise.

## 2022-09-30 NOTE — TELEPHONE ENCOUNTER
Patient wife addis states she messed up the surgery was on his spine on 04/28. Addis states she his surgeon Dr. Miller informed patient that he did his part and did the surgery and states he doesn't deal with his hand. Addis states she need someone to look at his hand patient does not take any medications he just put ice on his hands and back for the pain. Addis states the therapy is actually for his surgery that he had states he couldn't walk but now he is doing better with walking but states patient still walks with a cane. Addis states since the surgery patient had his hand has been giving him problems with pain and swelling.

## 2022-10-03 ENCOUNTER — HOSPITAL ENCOUNTER (OUTPATIENT)
Dept: RADIOLOGY | Facility: OTHER | Age: 82
Discharge: HOME | End: 2022-10-03
Attending: PHYSICIAN ASSISTANT
Payer: MEDICARE

## 2022-10-03 ENCOUNTER — OFFICE VISIT (OUTPATIENT)
Dept: ORTHOPEDICS | Facility: CLINIC | Age: 82
End: 2022-10-03
Payer: MEDICARE

## 2022-10-03 ENCOUNTER — CLINICAL SUPPORT (OUTPATIENT)
Dept: REHABILITATION | Facility: HOSPITAL | Age: 82
End: 2022-10-03
Payer: MEDICARE

## 2022-10-03 VITALS — BODY MASS INDEX: 23.84 KG/M2 | WEIGHT: 160.94 LBS | HEIGHT: 69 IN

## 2022-10-03 DIAGNOSIS — R53.1 FUNCTIONAL WEAKNESS: Primary | ICD-10-CM

## 2022-10-03 DIAGNOSIS — G56.01 CARPAL TUNNEL SYNDROME ON RIGHT: Primary | ICD-10-CM

## 2022-10-03 DIAGNOSIS — M79.643 PAIN OF HAND, UNSPECIFIED LATERALITY: ICD-10-CM

## 2022-10-03 DIAGNOSIS — M79.641 PAIN OF RIGHT HAND: ICD-10-CM

## 2022-10-03 DIAGNOSIS — G56.21 CUBITAL TUNNEL SYNDROME ON RIGHT: ICD-10-CM

## 2022-10-03 DIAGNOSIS — M79.641 PAIN OF RIGHT HAND: Primary | ICD-10-CM

## 2022-10-03 DIAGNOSIS — M72.0 DUPUYTREN'S CONTRACTURE: ICD-10-CM

## 2022-10-03 PROCEDURE — 97140 MANUAL THERAPY 1/> REGIONS: CPT

## 2022-10-03 PROCEDURE — 1125F AMNT PAIN NOTED PAIN PRSNT: CPT | Mod: CPTII,S$GLB,, | Performed by: PHYSICIAN ASSISTANT

## 2022-10-03 PROCEDURE — 1157F PR ADVANCE CARE PLAN OR EQUIV PRESENT IN MEDICAL RECORD: ICD-10-PCS | Mod: CPTII,S$GLB,, | Performed by: PHYSICIAN ASSISTANT

## 2022-10-03 PROCEDURE — 99213 OFFICE O/P EST LOW 20 MIN: CPT | Mod: S$GLB,,, | Performed by: PHYSICIAN ASSISTANT

## 2022-10-03 PROCEDURE — 3288F FALL RISK ASSESSMENT DOCD: CPT | Mod: CPTII,S$GLB,, | Performed by: PHYSICIAN ASSISTANT

## 2022-10-03 PROCEDURE — 1159F PR MEDICATION LIST DOCUMENTED IN MEDICAL RECORD: ICD-10-PCS | Mod: CPTII,S$GLB,, | Performed by: PHYSICIAN ASSISTANT

## 2022-10-03 PROCEDURE — 1101F PT FALLS ASSESS-DOCD LE1/YR: CPT | Mod: CPTII,S$GLB,, | Performed by: PHYSICIAN ASSISTANT

## 2022-10-03 PROCEDURE — 73130 X-RAY EXAM OF HAND: CPT | Mod: 26,RT,, | Performed by: RADIOLOGY

## 2022-10-03 PROCEDURE — 97110 THERAPEUTIC EXERCISES: CPT

## 2022-10-03 PROCEDURE — 97010 HOT OR COLD PACKS THERAPY: CPT

## 2022-10-03 PROCEDURE — 99999 PR PBB SHADOW E&M-EST. PATIENT-LVL III: ICD-10-PCS | Mod: PBBFAC,,, | Performed by: PHYSICIAN ASSISTANT

## 2022-10-03 PROCEDURE — 97022 WHIRLPOOL THERAPY: CPT

## 2022-10-03 PROCEDURE — 73130 XR HAND COMPLETE 3 VIEW RIGHT: ICD-10-PCS | Mod: 26,RT,, | Performed by: RADIOLOGY

## 2022-10-03 PROCEDURE — 1101F PR PT FALLS ASSESS DOC 0-1 FALLS W/OUT INJ PAST YR: ICD-10-PCS | Mod: CPTII,S$GLB,, | Performed by: PHYSICIAN ASSISTANT

## 2022-10-03 PROCEDURE — 1159F MED LIST DOCD IN RCRD: CPT | Mod: CPTII,S$GLB,, | Performed by: PHYSICIAN ASSISTANT

## 2022-10-03 PROCEDURE — 3288F PR FALLS RISK ASSESSMENT DOCUMENTED: ICD-10-PCS | Mod: CPTII,S$GLB,, | Performed by: PHYSICIAN ASSISTANT

## 2022-10-03 PROCEDURE — 99999 PR PBB SHADOW E&M-EST. PATIENT-LVL III: CPT | Mod: PBBFAC,,, | Performed by: PHYSICIAN ASSISTANT

## 2022-10-03 PROCEDURE — 1125F PR PAIN SEVERITY QUANTIFIED, PAIN PRESENT: ICD-10-PCS | Mod: CPTII,S$GLB,, | Performed by: PHYSICIAN ASSISTANT

## 2022-10-03 PROCEDURE — 1157F ADVNC CARE PLAN IN RCRD: CPT | Mod: CPTII,S$GLB,, | Performed by: PHYSICIAN ASSISTANT

## 2022-10-03 PROCEDURE — 99213 PR OFFICE/OUTPT VISIT, EST, LEVL III, 20-29 MIN: ICD-10-PCS | Mod: S$GLB,,, | Performed by: PHYSICIAN ASSISTANT

## 2022-10-03 PROCEDURE — 73130 X-RAY EXAM OF HAND: CPT | Mod: TC,FY,RT

## 2022-10-03 RX ORDER — SODIUM CHLORIDE FOR INHALATION 7 %
VIAL, NEBULIZER (ML) INHALATION
COMMUNITY
Start: 2022-09-20 | End: 2023-10-05 | Stop reason: SDUPTHER

## 2022-10-03 NOTE — PROGRESS NOTES
Occupational Therapy Daily Treatment Note   Date 10/3/2022    Name: Diego TIRADO [facility] Number: [id number]      Therapy Diagnosis:  Right UE weakness , sp laminectomy and stroke   Encounter Diagnosis   Name Primary?    Functional weakness Yes         Physician: Carlos Miller MD       Physician Orders: Z98.890 (ICD-10-CM) - Status post laminectomy   eval and treat   Medical Diagnosis: Z98.890 (ICD-10-CM) - Status post laminectomy      Surgical Procedure/ Date :cervical laminectomy 4/28/22 and spinal cord bleed   Mechanism of Injury: Diego reports: Pt with pain beginning at 2:30 am on 4/28/2022; decorticate posturing up upper extremity and unable to move bilateral lower extremities. Pt was rushing to the ER; Pt's wife states MD states he had bleeding from his head into his spine. 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   Pt stayed in hospital for 16 days then was transferred to [facility] for rehabilitation for 2 months (OT, PT, speech therapy). Pt was discharged and received OT/PT Home Health for 1 month. Pt biggest complaints involves hand movements, elbow extension, AROM of shoulder and ambulation. Pt only with complaints of pain from shoulder to hand (7/10) - describes as burning pain. Pt wife states pt struggling with short term memory and speech due to dysphagia after surgery.   Evaluation Date: 8/11/2022  Insurance:[facility]   Insurance Authorization period Expiration: 9/8/22   Plan of Care Certification Period: 10/31/22   -      Visit # / Visits Authortized: 13/ 11  Requested new order for more visits  Time In: 9:00 am   Time Out: 10:00   am    Total Billable Time:   60  minutes     Precautions: Diabetes    Subjective       Pt reports: mild pain, jreports he ahs pins and needles in hand , he has been putting ice on hand .      he was compliant with home exercise program given last session.  Response to previous treatment:increased use  "of right hand   Functional change: none noted yet     Pain: 0/10  Location: right UE     Objective      Wife reports that she is making an appointment with hand surgeon to eval dupuytren's in  hand .      Discussed with patient that is hand is tingling that is usually nerve involvement and recommend that patient does use heat not ice.   Discussed that nerves respond to warmth.       Diego received the following supervised modalities after being cleared for contradictions for 10 minutes:     -moist heat right shoulder and   Fluidotherapy: To R hand for 10 min, continuous air, 110 deg, air speed 100 to decrease pain, edema & scar tissue and increased tissue extensibility.             Diego received  Manual  Therapy   for 15  minutes including: in supine   - STM and retrograde massage right hand, forearm ( pt has dupuytren's cording  reports he has had for years )   scap mobs and PROM shoulder flex/Abd/ext   sidelying       PROM shoulder all ways supine  and PROM elbow and wrist  flexion/ extension,  X 10 reps   And in sidelying shoulder flexion, scaption with manual  scapula support      There ex X 35 minutes     TGE and grasp and release  right hand  , 10 reps   Supine AROM in Scaption, ER    Standing ball on wall alphabet , 1 set , with tactile cue with scapula to retraction  Prone on mat scapula " T " and " I " 30 reps    Prone on mat rows  2#  30 reps   Elbow flexion 2# flex/ ext  30 reps   Green flex bar sup/pro 20 reps    WB'ing Towel slides  with 3# in standing  in flexion x20         Yellow t band shoulder rows and  And shoulder extension 30 reps ( at home )       Home Exercises and Education Provided     Education provided:   - continue with HEP   - Progress towards goals     Written Home Exercises Provided: Patient instructed to cont prior HEP.    Exercises were reviewed and Diego was able to demonstrate them prior to the end of the session.  Diego demonstrated good  understanding of the education " "provided.   .   See EMR under Patient Instructions for exercises provided prior visit.       Assessment       Pt would continue to benefit from skilled OT.  Yes   increased strength with scapula "t" and " I" exercises   Pt. Able to make a functional fist after therapy. Pt prognosis is Good. Pt has dupuytren's cording in right hand . Patient would benefit from continued OT services. Requested new order for authorization.  Objectives to be measured by primary therapist for reliability.    Pt will continue to benefit from skilled outpatient occupational therapy to address the deficits listed in the problem list on initial evaluation provide pt/family education and to maximize pt's level of independence in the home and community environment.     Anticipated barriers to occupational therapy:  None     Pt's spiritual, cultural and educational needs considered and pt agreeable to plan of care and goals.      GOALS: 12 weeks. Pt agrees with goals set.     Short Term (6 weeks on 10/30/22  .     1)   Patient to be IND with HEP and modalities for pain management, progressing   2)   Increase ROM elbow extension and shoulder flexion, ex , scaption 10-15 degrees  For  Right UE  motion to increase functional hand use for right hand , progressing   3)   Increase  strength by  10 lbs. to grasp right hand , progressing   4)   Increase pinch 1-3 psis for  5, progressing   5)   Decrease edema .1-2cm to increase joint mobility /flexibility for improved overall functional hand use. , progressing   6)   Increase pinch 1-3 psi's to increase IND with button and FM Coordination.     Long Term (by discharge):  1)   Pt will report 0 out of 10 pain with right UE ., progressing   2)   Patient to score of 30% on FOTO to demonstrate improved perception of functional right hand/ arm  Use. Progressing   3)   Pt will return to prior level of function for ADLs and household management, progressing         Plan   Certification Period/Plan of care " expiration: 8/11/2022 to  10/31/22 .     Discussed Plan of Care with patient: Yes  Updates/Grading for next session:  Yes       Smitha Quick OTR/L,

## 2022-10-03 NOTE — PROGRESS NOTES
[facility]  History & Physical  Orthopedics    SUBJECTIVE:      Chief Complaint: Right hand swelling and numbness    Referring Provider: Portia Ferreira MD Dr. Dunbar is the supervising physician for this encounter/patient    History of Present Illness:  Patient is a 82 y.o. right hand dominant male who presents today with complaints of right hand pain and swelling since April 2022. Family reports a spinal column bleed in April 2022 requiring surgery, while inpatient for this, started to develop hand issues. He reports swelling off/on, had US eval done in the ED 9/16/22 which was negative for any thrombus. He reports painful numbness/tingling into the hand, wakes up at night often with this, will also occur throughout the day.     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    Onset of symptoms/DOI was months.    Symptoms are aggravated by activity, movement, at night, and during the day.    Symptoms are alleviated by activity.    Symptoms consist of pain, swelling, decreased ROM, and numbness/tingling.    The patient rates their pain as a 5/10.    Attempted treatment(s) and/or interventions include activity modifications, rest, physical and/or occupational therapy.     The patient denies any fevers, chills, N/V, D/C and presents for evaluation.       Past Medical History:   Diagnosis Date    Antiphospholipid syndrome 11/19/2021    Dysphagia     Hx of colonic polyps     followed by GI. Dr. Pham    Hyperlipidemia LDL goal <100     Overweight(278.02)     Prostate cancer september 2011    followed by urology, Dr. Rogers    Type II or unspecified type diabetes mellitus without mention of complication, not stated as uncontrolled     diet controlled     Past Surgical History:   Procedure Laterality Date    BRONCHOSCOPY N/A 10/15/2018    Procedure: BRONCHOSCOPY;  Surgeon: Dosc Diagnostic Provider;  Location: [facility] OR [address] [place] [address];  Service: Anesthesiology;  Laterality: N/A;    CATARACT  EXTRACTION, BILATERAL  2014    POSTERIOR CERVICAL LAMINECTOMY Bilateral 4/28/2022    Procedure: LAMINECTOMY, SPINE, CERVICAL, POSTERIOR APPROACH C3-6;  Surgeon: Carlos Miller MD;  Location: [facility] OR [address] [place] [address];  Service: Neurosurgery;  Laterality: Bilateral;    PROSTATECTOMY       Review of patient's allergies indicates:  No Known Allergies  Social History     Social History Narrative    Not on file     Family History   Problem Relation Age of Onset    Colon cancer Mother     Diabetes Mother     Heart disease Father     Mental illness Sister     Diabetes Sister     Other Brother         polio as a child    Pulmonary fibrosis Brother     Diabetes Brother     Myasthenia gravis Brother     Parkinsonism Brother     Diabetes Brother     Dementia Brother     Lung cancer Brother         smoker    Diabetes Maternal Aunt     Diabetes Other     Esophageal cancer Neg Hx          Current Outpatient Medications:     b complex vitamins capsule, Take 1 capsule by mouth once daily., Disp: , Rfl:     ethambutoL (MYAMBUTOL) 400 MG Tab, Take 2 tablets (800 mg total) by mouth once daily., Disp: 60 tablet, Rfl: 5    nebulizer and compressor ([employer]may COMPRESSOR SYSTEM) Marcy, use to administer nebulized medication as directed (Patient not taking: Reported on 9/8/2022), Disp: 1 each, Rfl: 0    omeprazole (PRILOSEC) 10 MG capsule, ONE CAPSULE BY MOUTH once a day as needed, Disp: , Rfl:     polyethylene glycol (GOLYTELY) 236-22.74-6.74 -5.86 gram suspension, Day 1: Dose 1, 4000ml. Day 2: Dose 2, 4000ml. For total of 8000ml., Disp: 8000 mL, Rfl: 0    pravastatin (PRAVACHOL) 10 MG tablet, Take 1 tablet (10 mg total) by mouth once daily., Disp: 90 tablet, Rfl: 0    sodium chloride 3% 3 % nebulizer solution, USE 4 ML VIAL IN NEBULIZER  TWICE DAILY, Disp: 480 mL, Rfl: 11    sodium chloride 7% 7 % nebulizer solution, use one vial in nebulizer twice a day, Disp: , Rfl:       Review of Systems:  Constitutional: no fever or chills  Eyes: no visual  "changes  ENT: no nasal congestion or sore throat  Respiratory: no cough or shortness of breath  Cardiovascular: no chest pain  Gastrointestinal: no nausea or vomiting, tolerating diet  Musculoskeletal: pain, soreness, numbness/tingling, and decreased ROM    OBJECTIVE:      Vital Signs (Most Recent):  Vitals:    10/03/22 1454   Weight: 73 kg (160 lb 15 oz)   Height: 5' 9" (1.753 m)     Body mass index is 23.77 kg/m².      Physical Exam:  Constitutional: The patient appears well-developed and well-nourished. No distress.   Skin: No lesions appreciated  Head: Normocephalic and atraumatic.   Nose: Nose normal.   Ears: No deformities seen  Eyes: Conjunctivae and EOM are normal.   Neck: No tracheal deviation present.   Cardiovascular: Normal rate and intact distal pulses.    Pulmonary/Chest: Effort normal. No respiratory distress.   Abdominal: There is no guarding.   Neurological: The patient is alert.   Psychiatric: The patient has a normal mood and affect.     Right Hand/Wrist Examination:    Observation/Inspection:  Swelling  Pos - generalized   Deformity  Index DIP amputation. Dupuytrens nodules noted palmar ring, ring and small finger flexion contractures noted  Discoloration  none     Scars   none    Atrophy  none    HAND/WRIST EXAMINATION:  Finkelstein's Test   Neg  WHAT Test    Neg  Snuff box tenderness   Neg  Sharp's Test    Neg  Hook of Hamate Tenderness  Neg  CMC grind    Neg  Circumduction test   Neg    Neurovascular Exam:  Digits WWP, brisk CR < 3s throughout  NVI motor/LTS to M/R/U nerves, radial pulse 2+  Tinel's Test - Carpal Tunnel  POS  Tinel's Test - Cubital Tunnel  Pos  Phalen's Test    POS  Median Nerve Compression Test POS    ROM hand: decreased flexion of all digits. Small PIP 40 degree contracture, Long PIP 15 degree contracture.    ROM wrist full, painless    ROM elbow full, painless    Abdomen not guarded  Respirations nonlabored  Perfusion intact    Diagnostic Results:     Imaging - I " independently viewed the patient's imaging as well as the radiology report.      FINDINGS:  Prior amputation distal phalanx 2nd digit.     I see no acute fracture.     Osteophyte formation, subchondral sclerosis, subchondral cyst formation, and advanced narrowing 1st CMC joint.  Mild degenerative changes scattered in the interphalangeal joints.     Narrowing of the distal radioulnar joint.     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    EMG - none    ASSESSMENT/PLAN:      82 y.o. yo male with Right carpal tunnel syndrome, possible cubital tunnel syndrome. Notable Dupuytrens contractures of ring and small fingers.    Plan: The patient and I had a thorough discussion today.  We discussed the working diagnosis as well as several other potential alternative diagnoses.  Treatment options were discussed, both conservative and surgical.  Conservative treatment options would include things such as activity modifications, workplace modifications, a period of rest, oral vs topical OTC and prescription anti-inflammatory medications, occupational therapy, splinting/bracing, immobilization, corticosteroid injections, and others.  Surgical options were discussed as well.     At this time, the patient would like to proceed with a trial of EMG for further evaluation. Nocturnal carpal tunnel brace given today. Discussed NSAIDs and Gabapentin for medication option, would prefer any medications prescribed come from his PCM. I will have him scheduled to see Dr. Lazo after EMG results to discuss the option of CTR and Dupuytrens release being performed at the same time.    Should the patient's symptoms worsen, persist, or fail to improve they should return for reevaluation and I would be happy to see them back anytime.           Please do not hesitate to reach out to us via email, phone, or MyChart with any questions, concerns, or feedback.

## 2022-10-05 ENCOUNTER — CLINICAL SUPPORT (OUTPATIENT)
Dept: REHABILITATION | Facility: HOSPITAL | Age: 82
End: 2022-10-05
Payer: MEDICARE

## 2022-10-05 DIAGNOSIS — R53.1 FUNCTIONAL WEAKNESS: Primary | ICD-10-CM

## 2022-10-05 PROCEDURE — 97110 THERAPEUTIC EXERCISES: CPT | Mod: CO

## 2022-10-05 PROCEDURE — 97140 MANUAL THERAPY 1/> REGIONS: CPT | Mod: CO

## 2022-10-05 NOTE — PROGRESS NOTES
Occupational Therapy Daily Treatment Note   Date 10/5/2022    Name: Diego TIRADO [facility] Number: [id number]      Therapy Diagnosis:  Right UE weakness , sp laminectomy and stroke   Encounter Diagnosis   Name Primary?    Functional weakness Yes           Physician: Carlos Miller MD       Physician Orders: Z98.890 (ICD-10-CM) - Status post laminectomy   eval and treat   Medical Diagnosis: Z98.890 (ICD-10-CM) - Status post laminectomy      Surgical Procedure/ Date :cervical laminectomy 4/28/22 and spinal cord bleed   Mechanism of Injury: Diego reports: Pt with pain beginning at 2:30 am on 4/28/2022; decorticate posturing up upper extremity and unable to move bilateral lower extremities. Pt was rushing to the ER; Pt's wife states MD states he had bleeding from his head into his spine. 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   Pt stayed in hospital for 16 days then was transferred to [facility] for rehabilitation for 2 months (OT, PT, speech therapy). Pt was discharged and received OT/PT Home Health for 1 month. Pt biggest complaints involves hand movements, elbow extension, AROM of shoulder and ambulation. Pt only with complaints of pain from shoulder to hand (7/10) - describes as burning pain. Pt wife states pt struggling with short term memory and speech due to dysphagia after surgery.   Evaluation Date: 8/11/2022  Insurance:[employer]   Insurance Authorization period Expiration: 10/11/22   Plan of Care Certification Period: 10/31/22   -      Visit # / Visits Authortized: 13/ 11  Requested new order for more visits  Time In: 11 am   Time Out: 11:58   am    Total Billable Time:   58 minutes     Precautions: Diabetes    Subjective       Pt reports: reported seeking medical care for hand with CTS and Dupuytren's with EMG soon. He will be out next week for an unrelated procedure.     he was compliant with home exercise program given last  "session.  Response to previous treatment:increased use of right hand   Functional change: none noted yet     Pain: 0/10  Location: right UE     Objective      Wife reports that she is making an appointment with hand surgeon to eval dupuytren's in  hand .      Discussed with patient that is hand is tingling that is usually nerve involvement and recommend that patient does use heat not ice.   Discussed that nerves respond to warmth.       Diego received the following supervised modalities after being cleared for contradictions for 10 minutes:     -moist heat right shoulder and   NT- Fluidotherapy: To R hand for 10 min, continuous air, 110 deg, air speed 100 to decrease pain, edema & scar tissue and increased tissue extensibility.             Diego received  Manual  Therapy   for 15  minutes including: in supine   - (NT) STM and retrograde massage right hand, forearm ( pt has dupuytren's cording  reports he has had for years )   scap mobs and PROM shoulder flex/Abd/ext   sidelying      PROM shoulder all ways supine  and PROM elbow and wrist  flexion/ extension,  X 10 reps   And in sidelying shoulder flexion, scaption with manual  scapula support      There ex X 33 minutes     TGE and grasp and release  right hand  , 10 reps (NT)  Supine AROM in Scaption, ER    Standing ball on wall alphabet , 1 set , with tactile cue with scapula to retraction NT  Prone on mat scapula " T " and " I " 30 reps    Prone on mat rows  2#  30 reps   Elbow flexion 2# flex/ ext  30 reps   Green flex bar sup/pro 20 reps (NT)    WB'ing Towel slides  with 3# in standing  in flexion x20 (NT)  Scap pushups x10 (2 sets)  UBE x 5 min L1 BW  Punches seated x10         Yellow t band shoulder rows and  And shoulder extension 30 reps ( at home )       Home Exercises and Education Provided     Education provided:   - continue with HEP   - Progress towards goals     Written Home Exercises Provided: Patient instructed to cont prior HEP.    Exercises were " reviewed and Diego was able to demonstrate them prior to the end of the session.  Diego demonstrated good  understanding of the education provided.   .   See EMR under Patient Instructions for exercises provided prior visit.       Assessment       Pt would continue to benefit from skilled OT.  Yes   Good tolerance to tx. Demo'd improvement with scapular strength and some decr winging post exercises.  Pt prognosis is Good. Pt has dupuytren's cording in right hand . Patient would benefit from continued OT services. Requested new order for authorization.  Objectives to be measured by primary therapist for reliability.     Pt will continue to benefit from skilled outpatient occupational therapy to address the deficits listed in the problem list on initial evaluation provide pt/family education and to maximize pt's level of independence in the home and community environment.     Anticipated barriers to occupational therapy:  None     Pt's spiritual, cultural and educational needs considered and pt agreeable to plan of care and goals.      GOALS: 12 weeks. Pt agrees with goals set.     Short Term (6 weeks on 10/30/22  .     1)   Patient to be IND with HEP and modalities for pain management, progressing   2)   Increase ROM elbow extension and shoulder flexion, ex , scaption 10-15 degrees  For  Right UE  motion to increase functional hand use for right hand , progressing   3)   Increase  strength by  10 lbs. to grasp right hand , progressing   4)   Increase pinch 1-3 psis for  5, progressing   5)   Decrease edema .1-2cm to increase joint mobility /flexibility for improved overall functional hand use. , progressing   6)   Increase pinch 1-3 psi's to increase IND with button and FM Coordination.     Long Term (by discharge):  1)   Pt will report 0 out of 10 pain with right UE ., progressing   2)   Patient to score of 30% on FOTO to demonstrate improved perception of functional right hand/ arm  Use. Progressing   3)    Pt will return to prior level of function for ADLs and household management, progressing         Plan   Certification Period/Plan of care expiration: 8/11/2022 to  10/31/22 .     Discussed Plan of Care with patient: Yes  Updates/Grading for next session:  Yes       JAMIE Torres/KVNG

## 2022-10-07 ENCOUNTER — TELEPHONE (OUTPATIENT)
Dept: SURGERY | Facility: CLINIC | Age: 82
End: 2022-10-07
Payer: MEDICARE

## 2022-10-07 RX ORDER — AZITHROMYCIN 500 MG/1
500 TABLET, FILM COATED ORAL DAILY
COMMUNITY
End: 2023-04-26 | Stop reason: SDUPTHER

## 2022-10-07 RX ORDER — ASPIRIN 81 MG/1
81 TABLET ORAL DAILY
COMMUNITY

## 2022-10-07 NOTE — PRE-PROCEDURE INSTRUCTIONS
Preop instructions:      NPO instructions: NO solids foods,non-clear liquids including milk, milk products, creamers, gum, mints, or hard candy after midnight the night prior to your surgery or 8 hours prior to your SX start time. 2400  We encourage a limited consumption of CLEAR LIQUIDS after midnight the night before your surgery up to 2 hrs prior to your SX start time. 1000      Acceptable Clear Liquids are listed below:     Water, Gatorade/Powerade sports drinks, Apple juice (no pulp) Black coffee with without sugar/NO milk, cream or creamer or Jello.      If you have received specific instructions from your Surgeon/Surgeon's staff, please follow their instructions.      Showering instructions, directions, leave all valuables at home, medication instructions for PM prior & am of procedure explained. Patient stated an understanding.      Patient denies any side effects or issues with anesthesia or sedation.       1000 ARRIVAL TIME TO [facility]

## 2022-10-10 ENCOUNTER — ANESTHESIA EVENT (OUTPATIENT)
Dept: SURGERY | Facility: HOSPITAL | Age: 82
End: 2022-10-10
Payer: MEDICARE

## 2022-10-10 ENCOUNTER — HOSPITAL ENCOUNTER (OUTPATIENT)
Facility: HOSPITAL | Age: 82
Discharge: HOME | End: 2022-10-10
Attending: STUDENT IN AN ORGANIZED HEALTH CARE EDUCATION/TRAINING PROGRAM | Admitting: STUDENT IN AN ORGANIZED HEALTH CARE EDUCATION/TRAINING PROGRAM
Payer: MEDICARE

## 2022-10-10 ENCOUNTER — ANESTHESIA (OUTPATIENT)
Dept: SURGERY | Facility: HOSPITAL | Age: 82
End: 2022-10-10
Payer: MEDICARE

## 2022-10-10 VITALS
OXYGEN SATURATION: 96 % | SYSTOLIC BLOOD PRESSURE: 134 MMHG | TEMPERATURE: 98 F | DIASTOLIC BLOOD PRESSURE: 64 MMHG | RESPIRATION RATE: 10 BRPM | HEART RATE: 47 BPM

## 2022-10-10 DIAGNOSIS — K40.90 NON-RECURRENT UNILATERAL INGUINAL HERNIA WITHOUT OBSTRUCTION OR GANGRENE: ICD-10-CM

## 2022-10-10 DIAGNOSIS — K40.90 RIGHT INGUINAL HERNIA: ICD-10-CM

## 2022-10-10 LAB — POCT GLUCOSE: 109 MG/DL (ref 70–110)

## 2022-10-10 PROCEDURE — 71000016 HC POSTOP RECOV ADDL HR: Performed by: STUDENT IN AN ORGANIZED HEALTH CARE EDUCATION/TRAINING PROGRAM

## 2022-10-10 PROCEDURE — 36000706: Performed by: STUDENT IN AN ORGANIZED HEALTH CARE EDUCATION/TRAINING PROGRAM

## 2022-10-10 PROCEDURE — 88302 TISSUE EXAM BY PATHOLOGIST: CPT | Performed by: PATHOLOGY

## 2022-10-10 PROCEDURE — D9220A PRA ANESTHESIA: ICD-10-PCS | Mod: ANES,,, | Performed by: ANESTHESIOLOGY

## 2022-10-10 PROCEDURE — 63600175 PHARM REV CODE 636 W HCPCS: Performed by: NURSE ANESTHETIST, CERTIFIED REGISTERED

## 2022-10-10 PROCEDURE — 49505 PRP I/HERN INIT REDUC >5 YR: CPT | Mod: RT,,, | Performed by: STUDENT IN AN ORGANIZED HEALTH CARE EDUCATION/TRAINING PROGRAM

## 2022-10-10 PROCEDURE — 88302 PR  SURG PATH,LEVEL II: ICD-10-PCS | Mod: 26,,, | Performed by: PATHOLOGY

## 2022-10-10 PROCEDURE — 25000003 PHARM REV CODE 250: Performed by: STUDENT IN AN ORGANIZED HEALTH CARE EDUCATION/TRAINING PROGRAM

## 2022-10-10 PROCEDURE — 37000009 HC ANESTHESIA EA ADD 15 MINS: Performed by: STUDENT IN AN ORGANIZED HEALTH CARE EDUCATION/TRAINING PROGRAM

## 2022-10-10 PROCEDURE — D9220A PRA ANESTHESIA: Mod: CRNA,,, | Performed by: NURSE ANESTHETIST, CERTIFIED REGISTERED

## 2022-10-10 PROCEDURE — 82962 GLUCOSE BLOOD TEST: CPT | Performed by: STUDENT IN AN ORGANIZED HEALTH CARE EDUCATION/TRAINING PROGRAM

## 2022-10-10 PROCEDURE — 71000044 HC DOSC ROUTINE RECOVERY FIRST HOUR: Performed by: STUDENT IN AN ORGANIZED HEALTH CARE EDUCATION/TRAINING PROGRAM

## 2022-10-10 PROCEDURE — 71000015 HC POSTOP RECOV 1ST HR: Performed by: STUDENT IN AN ORGANIZED HEALTH CARE EDUCATION/TRAINING PROGRAM

## 2022-10-10 PROCEDURE — D9220A PRA ANESTHESIA: Mod: ANES,,, | Performed by: ANESTHESIOLOGY

## 2022-10-10 PROCEDURE — 25000003 PHARM REV CODE 250

## 2022-10-10 PROCEDURE — 37000008 HC ANESTHESIA 1ST 15 MINUTES: Performed by: STUDENT IN AN ORGANIZED HEALTH CARE EDUCATION/TRAINING PROGRAM

## 2022-10-10 PROCEDURE — 36000707: Performed by: STUDENT IN AN ORGANIZED HEALTH CARE EDUCATION/TRAINING PROGRAM

## 2022-10-10 PROCEDURE — 88302 TISSUE EXAM BY PATHOLOGIST: CPT | Mod: 26,,, | Performed by: PATHOLOGY

## 2022-10-10 PROCEDURE — C1781 MESH (IMPLANTABLE): HCPCS | Performed by: STUDENT IN AN ORGANIZED HEALTH CARE EDUCATION/TRAINING PROGRAM

## 2022-10-10 PROCEDURE — 49505 PR REPAIR ING HERNIA,5+Y/O,REDUCIBL: ICD-10-PCS | Mod: RT,,, | Performed by: STUDENT IN AN ORGANIZED HEALTH CARE EDUCATION/TRAINING PROGRAM

## 2022-10-10 PROCEDURE — 25000003 PHARM REV CODE 250: Performed by: NURSE ANESTHETIST, CERTIFIED REGISTERED

## 2022-10-10 PROCEDURE — D9220A PRA ANESTHESIA: ICD-10-PCS | Mod: CRNA,,, | Performed by: NURSE ANESTHETIST, CERTIFIED REGISTERED

## 2022-10-10 DEVICE — MESH HERNIA KEYHOLE LG 2X12: Type: IMPLANTABLE DEVICE | Site: GROIN | Status: FUNCTIONAL

## 2022-10-10 RX ORDER — LIDOCAINE HYDROCHLORIDE 20 MG/ML
INJECTION, SOLUTION EPIDURAL; INFILTRATION; INTRACAUDAL; PERINEURAL
Status: DISCONTINUED | OUTPATIENT
Start: 2022-10-10 | End: 2022-10-10

## 2022-10-10 RX ORDER — EPHEDRINE SULFATE 50 MG/ML
INJECTION, SOLUTION INTRAVENOUS
Status: DISCONTINUED | OUTPATIENT
Start: 2022-10-10 | End: 2022-10-10

## 2022-10-10 RX ORDER — BUPIVACAINE HYDROCHLORIDE 2.5 MG/ML
INJECTION, SOLUTION EPIDURAL; INFILTRATION; INTRACAUDAL
Status: DISCONTINUED | OUTPATIENT
Start: 2022-10-10 | End: 2022-10-10 | Stop reason: HOSPADM

## 2022-10-10 RX ORDER — ROCURONIUM BROMIDE 10 MG/ML
INJECTION, SOLUTION INTRAVENOUS
Status: DISCONTINUED | OUTPATIENT
Start: 2022-10-10 | End: 2022-10-10

## 2022-10-10 RX ORDER — SODIUM CHLORIDE 0.9 % (FLUSH) 0.9 %
3 SYRINGE (ML) INJECTION
Status: DISCONTINUED | OUTPATIENT
Start: 2022-10-10 | End: 2022-10-10 | Stop reason: HOSPADM

## 2022-10-10 RX ORDER — FENTANYL CITRATE 50 UG/ML
INJECTION, SOLUTION INTRAMUSCULAR; INTRAVENOUS
Status: DISCONTINUED | OUTPATIENT
Start: 2022-10-10 | End: 2022-10-10

## 2022-10-10 RX ORDER — HYDROCODONE BITARTRATE AND ACETAMINOPHEN 5; 325 MG/1; MG/1
1 TABLET ORAL ONCE
Status: DISCONTINUED | OUTPATIENT
Start: 2022-10-10 | End: 2022-10-10

## 2022-10-10 RX ORDER — ONDANSETRON 2 MG/ML
INJECTION INTRAMUSCULAR; INTRAVENOUS
Status: DISCONTINUED | OUTPATIENT
Start: 2022-10-10 | End: 2022-10-10

## 2022-10-10 RX ORDER — HYDROCODONE BITARTRATE AND ACETAMINOPHEN 5; 325 MG/1; MG/1
1 TABLET ORAL EVERY 6 HOURS PRN
Qty: 21 TABLET | Refills: 0 | Status: SHIPPED | OUTPATIENT
Start: 2022-10-10 | End: 2023-01-05

## 2022-10-10 RX ORDER — DEXMEDETOMIDINE HYDROCHLORIDE 100 UG/ML
INJECTION, SOLUTION INTRAVENOUS
Status: DISCONTINUED | OUTPATIENT
Start: 2022-10-10 | End: 2022-10-10

## 2022-10-10 RX ORDER — PROPOFOL 10 MG/ML
VIAL (ML) INTRAVENOUS
Status: DISCONTINUED | OUTPATIENT
Start: 2022-10-10 | End: 2022-10-10

## 2022-10-10 RX ORDER — CEFAZOLIN SODIUM/WATER 2 G/20 ML
2 SYRINGE (ML) INTRAVENOUS
Status: COMPLETED | OUTPATIENT
Start: 2022-10-10 | End: 2022-10-10

## 2022-10-10 RX ORDER — ACETAMINOPHEN 10 MG/ML
INJECTION, SOLUTION INTRAVENOUS
Status: DISCONTINUED | OUTPATIENT
Start: 2022-10-10 | End: 2022-10-10

## 2022-10-10 RX ORDER — SODIUM CHLORIDE 9 MG/ML
INJECTION, SOLUTION INTRAVENOUS CONTINUOUS
Status: DISCONTINUED | OUTPATIENT
Start: 2022-10-10 | End: 2022-10-10 | Stop reason: HOSPADM

## 2022-10-10 RX ORDER — DEXAMETHASONE SODIUM PHOSPHATE 4 MG/ML
INJECTION, SOLUTION INTRA-ARTICULAR; INTRALESIONAL; INTRAMUSCULAR; INTRAVENOUS; SOFT TISSUE
Status: DISCONTINUED | OUTPATIENT
Start: 2022-10-10 | End: 2022-10-10

## 2022-10-10 RX ORDER — OXYCODONE HYDROCHLORIDE 5 MG/1
5 TABLET ORAL EVERY 6 HOURS PRN
Status: DISCONTINUED | OUTPATIENT
Start: 2022-10-10 | End: 2022-10-10 | Stop reason: HOSPADM

## 2022-10-10 RX ADMIN — ONDANSETRON 4 MG: 2 INJECTION INTRAMUSCULAR; INTRAVENOUS at 11:10

## 2022-10-10 RX ADMIN — SUGAMMADEX 200 MG: 100 INJECTION, SOLUTION INTRAVENOUS at 12:10

## 2022-10-10 RX ADMIN — ROCURONIUM BROMIDE 50 MG: 50 INJECTION INTRAVENOUS at 11:10

## 2022-10-10 RX ADMIN — ROCURONIUM BROMIDE 10 MG: 50 INJECTION INTRAVENOUS at 12:10

## 2022-10-10 RX ADMIN — LIDOCAINE HYDROCHLORIDE 100 MG: 20 INJECTION, SOLUTION EPIDURAL; INFILTRATION; INTRACAUDAL at 11:10

## 2022-10-10 RX ADMIN — OXYCODONE 5 MG: 5 TABLET ORAL at 02:10

## 2022-10-10 RX ADMIN — DEXAMETHASONE SODIUM PHOSPHATE 4 MG: 4 INJECTION INTRA-ARTICULAR; INTRALESIONAL; INTRAMUSCULAR; INTRAVENOUS; SOFT TISSUE at 11:10

## 2022-10-10 RX ADMIN — PROPOFOL 150 MG: 10 INJECTION, EMULSION INTRAVENOUS at 11:10

## 2022-10-10 RX ADMIN — DEXMEDETOMIDINE HYDROCHLORIDE 4 MCG: 100 INJECTION, SOLUTION INTRAVENOUS at 12:10

## 2022-10-10 RX ADMIN — ACETAMINOPHEN 1000 MG: 10 INJECTION INTRAVENOUS at 12:10

## 2022-10-10 RX ADMIN — EPHEDRINE SULFATE 5 MG: 50 INJECTION INTRAVENOUS at 11:10

## 2022-10-10 RX ADMIN — SODIUM CHLORIDE: 0.9 INJECTION, SOLUTION INTRAVENOUS at 10:10

## 2022-10-10 RX ADMIN — Medication 2 G: at 11:10

## 2022-10-10 RX ADMIN — GLYCOPYRROLATE 0.2 MG: 0.2 INJECTION INTRAMUSCULAR; INTRAVENOUS at 11:10

## 2022-10-10 RX ADMIN — FENTANYL CITRATE 100 MCG: 0.05 INJECTION, SOLUTION INTRAMUSCULAR; INTRAVENOUS at 11:10

## 2022-10-10 RX ADMIN — SODIUM CHLORIDE, SODIUM GLUCONATE, SODIUM ACETATE, POTASSIUM CHLORIDE, MAGNESIUM CHLORIDE, SODIUM PHOSPHATE, DIBASIC, AND POTASSIUM PHOSPHATE: .53; .5; .37; .037; .03; .012; .00082 INJECTION, SOLUTION INTRAVENOUS at 11:10

## 2022-10-10 NOTE — INTERVAL H&P NOTE
The patient has been examined and the H&P has been reviewed:    I concur with the findings and no changes have occurred since H&P was written.    Surgery risks, benefits and alternative options discussed and understood by patient/family.      Active Hospital Problems    Diagnosis  POA    *Reducible right inguinal hernia [K40.90]  Yes      Resolved Hospital Problems   No resolved problems to display.     Dario Esquivel MD  General Surgery and Surgical Critical Care  [facility]

## 2022-10-10 NOTE — BRIEF OP NOTE
[facility] ([facility])  Brief Operative Note    Surgery Date: 10/10/2022     Surgeon(s) and Role:     * Dario Esquivel MD - Primary     * Rose Marie Grayson MD - Resident - Assisting        Pre-op Diagnosis:  Non-recurrent unilateral inguinal hernia without obstruction or gangrene [K40.90]    Post-op Diagnosis:  Post-Op Diagnosis Codes:     * Non-recurrent unilateral inguinal hernia without obstruction or gangrene [K40.90]    Procedure(s) (LRB):  REPAIR, HERNIA, INGUINAL, WITHOUT HISTORY OF PRIOR REPAIR, AGE 7 YEARS OR OLDER (Right)    Anesthesia: General    Operative Findings:   Right inguinal hernia repair with mesh performed without apparent complication.     Estimated Blood Loss: < 25cc          Specimens:   Specimen (24h ago, onward)       Start     Ordered    10/10/22 1242  Specimen to Pathology, Surgery General Surgery  Once        Comments: Pre-op Diagnosis: Non-recurrent unilateral inguinal hernia without obstruction or gangrene [K40.90]Procedure(s):REPAIR, HERNIA, INGUINAL, WITHOUT HISTORY OF PRIOR REPAIR, AGE 5 YEARS OR OLDER Number of specimens: 1Name of specimens: 1. Right Inguinal Hernia Sack     References:    Click here for ordering Quick Tip   Question Answer Comment   Procedure Type: General Surgery    Which provider would you like to cc? DARIO ESQUIVEL    Release to patient Immediate        10/10/22 1243                      Discharge Note    OUTCOME: Patient tolerated treatment/procedure well without complication and is now ready for discharge.    DISPOSITION: Home or Self Care    FINAL DIAGNOSIS:  Reducible right inguinal hernia    FOLLOWUP: In clinic    DISCHARGE INSTRUCTIONS:    Discharge Procedure Orders   Diet Adult Regular     Notify your health care provider if you experience any of the following:  temperature >100.4     Notify your health care provider if you experience any of the following:  persistent nausea and vomiting or diarrhea     Notify your health care provider if you  experience any of the following:  severe uncontrolled pain     Notify your health care provider if you experience any of the following:  redness, tenderness, or signs of infection (pain, swelling, redness, odor or green/yellow discharge around incision site)     No dressing needed   Order Comments: Surgical glue is applied to your incision it will wear off in two weeks     Activity as tolerated   Order Comments: Surgery Post Op Instructions:    You had a right inguinal hernia repair performed today.     Wound care:  Your incision is covered with Dermabond, a type of surgical super glue. You may shower tomorrow - let soapy water run over the incision but do not scrub the area. You must avoid submerging the incision in pools, bathtubs, etc until it is fully healed in 4-6 weeks.     You need to limit yourself to light duty activities (no lifting/pulling/pushing  >10 lbs) for a total of 6 weeks after surgery.    No dietary restrictions.    Medications:  A narcotic pain medication has been prescribed.  Please start to wean the pain medication over the following few days.  You can take tylenol instead of the pain medication.      Please take miralax 1 capful at night to prevent constipation associated with narcotic pain medications.      Follow up:  Return to clinic in 2 weeks for follow up and wound check.

## 2022-10-10 NOTE — ANESTHESIA PREPROCEDURE EVALUATION
10/10/2022  Diego Gunter is a 82 y.o., male.      Pre-op Assessment    I have reviewed the Patient Summary Reports.       I have reviewed the Medications.     Review of Systems  Anesthesia Hx:  History of prior surgery of interest to airway management or planning:  Denies Personal Hx of Anesthesia complications.   Hematology/Oncology:        Hematology Comments: Hypercoagulable  antiphosphlipid   Cardiovascular:   hyperlipidemia    Pulmonary:   COPD    Neurological:   CVA    Endocrine:   Diabetes        Physical Exam  General: Well nourished    Airway:  Mallampati: III / II  Mouth Opening: Normal  TM Distance: Normal  Tongue: Normal  Neck ROM: Normal ROM    Chest/Lungs:  Normal Respiratory Rate    Heart:  Rate: Normal        Anesthesia Plan  Type of Anesthesia, risks & benefits discussed:    Anesthesia Type: Gen ETT  Intra-op Monitoring Plan: Standard ASA Monitors  Post Op Pain Control Plan: multimodal analgesia  Induction:  IV  Airway Plan: Video  Informed Consent: Informed consent signed with the Patient and all parties understand the risks and agree with anesthesia plan.  All questions answered.   ASA Score: 3  Day of Surgery Review of History & Physical: H&P Update referred to the surgeon/provider.  Anesthesia Plan Notes: NPO confirmed.   No history of anesthesia problems.     Ready For Surgery From Anesthesia Perspective.     .

## 2022-10-10 NOTE — TRANSFER OF CARE
Anesthesia Transfer of Care Note    Patient: Diego Gunter    Procedure(s) Performed: Procedure(s) (LRB):  REPAIR, HERNIA, INGUINAL, WITHOUT HISTORY OF PRIOR REPAIR, AGE 5 YEARS OR OLDER (Right)    Patient location: [facility]    Anesthesia Type: general    Transport from OR: Transported from OR on 6-10 L/min O2 by face mask with adequate spontaneous ventilation    Post pain: adequate analgesia    Post assessment: no apparent anesthetic complications and tolerated procedure well    Post vital signs: stable    Level of consciousness: awake    Nausea/Vomiting: no nausea/vomiting    Complications: none    Transfer of care protocol was followed      Last vitals:   Visit Vitals  BP (!) 146/75 (BP Location: Left arm, Patient Position: Lying)   Pulse (!) 55   Resp 16   SpO2 96%

## 2022-10-10 NOTE — ANESTHESIA POSTPROCEDURE EVALUATION
Anesthesia Post Evaluation    Patient: Diego Gunter    Procedure(s) Performed: Procedure(s) (LRB):  REPAIR, HERNIA, INGUINAL, WITHOUT HISTORY OF PRIOR REPAIR, AGE 5 YEARS OR OLDER (Right)    Final Anesthesia Type: general      Patient location during evaluation: PACU  Patient participation: Yes- Able to Participate  Level of consciousness: awake and alert  Post-procedure vital signs: reviewed and stable  Pain management: adequate  Airway patency: patent    PONV status at discharge: No PONV  Anesthetic complications: no      Cardiovascular status: blood pressure returned to baseline  Respiratory status: unassisted  Hydration status: euvolemic  Follow-up not needed.          Vitals Value Taken Time   /65 10/10/22 1333   Temp 36.6 °C (97.9 °F) 10/10/22 1310   Pulse 55 10/10/22 1341   Resp 13 10/10/22 1341   SpO2 97 % 10/10/22 1341   Vitals shown include unvalidated device data.      No case tracking events are documented in the log.      Pain/Shena Score: Shena Score: 9 (10/10/2022  1:15 PM)

## 2022-10-10 NOTE — PATIENT INSTRUCTIONS
Surgery Post Op Instructions:    You had a right inguinal hernia repair performed today.     Wound care:  Your incision is covered with Dermabond, a type of surgical super glue. You may shower tomorrow - let soapy water run over the incision but do not scrub the area. You must avoid submerging the incision in pools, bathtubs, etc until it is fully healed in 4-6 weeks.     You need to limit yourself to light duty activities (no lifting/pulling/pushing >10 lbs) for a total of 6 weeks after surgery.    No dietary restrictions.    Medications:  A narcotic pain medication has been prescribed.  Please start to wean the pain medication over the following few days.  You can take tylenol instead of the pain medication.      Please take miralax 1 capful at night to prevent constipation associated with narcotic pain medications.      Follow up:  Return to clinic in 2 weeks for follow up and wound check.

## 2022-10-10 NOTE — OP NOTE
[facility]  General Surgery  Operative Note    DATE OF PROCEDURE: 10/10/2022     PREOPERATIVE DIAGNOSIS: Right inguinal hernia without obstruction or gangrene.     POSTOPERATIVE DIAGNOSIS: Right inguinal hernia without obstruction or gangrene.    PROCEDURE PERFORMED: Right inguinal hernia repair with mesh.     ATTENDING SURGEON: Dario Esquivel M.D.     HOUSESTAFF SURGEON: Rose Marie Grayson M.D. (PGY-2)     ANESTHESIA: Monitored anesthesia care plus local using 0.25% Marcaine.     ESTIMATED BLOOD LOSS: Minimal     FINDINGS: Moderate-sized right indirect inguinal hernia repaired with Prolene mesh.    SPECIMEN: Right inguinal hernia sac     DRAINS: None.     COMPLICATIONS: None.     INDICATIONS: Diego Gunter is a 82 y.o.man referred to my General Surgery Clinic with a history of a symptomatic, reducible inguinal bulge. He previously presented to the ED with an incarcerated right inguinal hernia that was able to be reduced in the ED. We recommended an open inguinal hernia repair with mesh and the patient agreed to proceed. The patient signed informed consent and expressed understanding of the risks and benefits of surgery.     DESCRIPTION OF OPERATIVE PROCEDURE: The patient was identified in preoperative holding and brought back to the Operating Room. The patient was placed supine on the operating table and padded appropriately. Monitors were applied and monitored anesthesia care was initiated. His right groin was shaved with electric clippers and prepped and draped in the standard sterile surgical fashion. A timeout was performed and all team members present agreed this was the correct procedure on the correct side of the correct patient. We also confirmed administration of appropriate preoperative antibiotics.     We marked out an appropriate right inguinal incision and administered Marcaine. After assuring adequate local anesthesia, a 5 cm oblique skin incision was made. Subcutaneous tissue  was divided with Bovie electrocautery. This dissection was carried down through Zhane fascia down to the aponeurosis of the external oblique. The aponeurosis of the external oblique was incised in line with its fibers, first with a scalpel and then Metzenbaum scissors, and this incision was extended to the external spermatic ring. The inguinal canal contents were bluntly encircled, first digitally and then with a Penrose drain. Gelpi retractors were placed to facilitate the dissection. We proceeded to identify a indirect inguinal hernia sac, which was carefully dissected away from the inguinal canal contents until it could be reduced into the defect. We opened the hernia sac and ensure there was no bowel in it. We ligated the sac using a 2-0 Vicryl suture. We performed skeletonization of the spermatic cord. We then had appropriate borders of the inguinal canal identified and decided to repair the defect with a piece of Prolene mesh. The mesh was precut to fit the defect appropriately and sewn in place with interrupted 2-0 Prolene suture. Medially, the mesh was anchored to the fascia overlying the pubic tubercle. Inferiorly, the mesh was anchored to the shelving edge of the inguinal ligament. Superiorly, the mesh was anchored to the conjoined tendon. The tails of the mesh were brought together around the cord structures creating a new internal ring that just admitted the tip of the finger. The mesh was inspected and noted to lie in appropriate position without any redundancy or tension. The testicle was pulled back down to the scrotum and there was noted to be no tension on the cord structures. The aponeurosis of the external oblique was closed with a running 3-0 Vicryl suture. Zhane fascia was closed with several buried interrupted 3-0 Vicryl sutures and the skin was closed with a running subcuticular Monocryl suture. A sterile dressing was applied. The patient was then transported to the Recovery Room in stable  condition. All sponge, instrument and needle counts were correct at the end of the case. I was present and scrubbed for the entire procedure.

## 2022-10-10 NOTE — ANESTHESIA PROCEDURE NOTES
Intubation    Date/Time: 10/10/2022 11:03 AM  Performed by: Kate Khan CRNA  Authorized by: Mykel Gardner MD     Intubation:     Induction:  Intravenous    Intubated:  Postinduction    Mask Ventilation:  Easy mask    Attempts:  1    Attempted By:  CRNA    Blade:  Calix 2    Laryngeal View Grade: Grade I - full view of cords      Difficult Airway Encountered?: No      Complications:  None    Airway Device:  Oral endotracheal tube    Airway Device Size:  7.5    Style/Cuff Inflation:  Cuffed    Inflation Amount (mL):  6    Tube secured:  21    Secured at:  The lips    Placement Verified By:  Capnometry    Complicating Factors:  None    Findings Post-Intubation:  BS equal bilateral and atraumatic/condition of teeth unchanged

## 2022-10-14 ENCOUNTER — TELEPHONE (OUTPATIENT)
Dept: SURGERY | Facility: CLINIC | Age: 82
End: 2022-10-14
Payer: MEDICARE

## 2022-10-14 LAB
FINAL PATHOLOGIC DIAGNOSIS: NORMAL
GROSS: NORMAL
Lab: NORMAL

## 2022-10-14 NOTE — TELEPHONE ENCOUNTER
Pt's wife called to inquire if he can participate in OT for his shoulder and hand next week.  He is s/p inguinal hernia repair from 10/10/22.  OT consists of upper arm exercises that may have an impact on his abdominal core.  Advised that he should not participate in OT until cleared by Dr. Esquivel at his PO appointment on 10/26/22.  She verbalized understanding.

## 2022-10-14 NOTE — TELEPHONE ENCOUNTER
----- Message from Margret Quevedo sent at 10/14/2022 11:24 AM CDT -----  Contact: pt  Pt requesting a callback to speak with a nurse pt had a procedure on 10/08 wants to know if its ok to do therapy on 10/17             Confirmed contact below:  Contact Name:Diego Jesuskelly  Phone Number: [phone]

## 2022-10-19 ENCOUNTER — TELEPHONE (OUTPATIENT)
Dept: SURGERY | Facility: CLINIC | Age: 82
End: 2022-10-19
Payer: MEDICARE

## 2022-10-19 ENCOUNTER — PATIENT MESSAGE (OUTPATIENT)
Dept: SURGERY | Facility: CLINIC | Age: 82
End: 2022-10-19
Payer: MEDICARE

## 2022-10-19 ENCOUNTER — OFFICE VISIT (OUTPATIENT)
Dept: INFECTIOUS DISEASES | Facility: CLINIC | Age: 82
End: 2022-10-19
Payer: MEDICARE

## 2022-10-19 VITALS
HEIGHT: 69 IN | DIASTOLIC BLOOD PRESSURE: 78 MMHG | WEIGHT: 161.94 LBS | BODY MASS INDEX: 23.98 KG/M2 | SYSTOLIC BLOOD PRESSURE: 121 MMHG | OXYGEN SATURATION: 99 % | HEART RATE: 76 BPM

## 2022-10-19 DIAGNOSIS — A31.0 MAI (MYCOBACTERIUM AVIUM-INTRACELLULARE): Primary | ICD-10-CM

## 2022-10-19 PROCEDURE — 99999 PR PBB SHADOW E&M-EST. PATIENT-LVL III: ICD-10-PCS | Mod: PBBFAC,,,

## 2022-10-19 PROCEDURE — 1125F PR PAIN SEVERITY QUANTIFIED, PAIN PRESENT: ICD-10-PCS | Mod: CPTII,S$GLB,, | Performed by: INTERNAL MEDICINE

## 2022-10-19 PROCEDURE — 1125F AMNT PAIN NOTED PAIN PRSNT: CPT | Mod: CPTII,S$GLB,, | Performed by: INTERNAL MEDICINE

## 2022-10-19 PROCEDURE — 3078F PR MOST RECENT DIASTOLIC BLOOD PRESSURE < 80 MM HG: ICD-10-PCS | Mod: CPTII,S$GLB,, | Performed by: INTERNAL MEDICINE

## 2022-10-19 PROCEDURE — 3074F SYST BP LT 130 MM HG: CPT | Mod: CPTII,S$GLB,, | Performed by: INTERNAL MEDICINE

## 2022-10-19 PROCEDURE — 1159F PR MEDICATION LIST DOCUMENTED IN MEDICAL RECORD: ICD-10-PCS | Mod: CPTII,S$GLB,, | Performed by: INTERNAL MEDICINE

## 2022-10-19 PROCEDURE — 3288F FALL RISK ASSESSMENT DOCD: CPT | Mod: CPTII,S$GLB,, | Performed by: INTERNAL MEDICINE

## 2022-10-19 PROCEDURE — 1100F PTFALLS ASSESS-DOCD GE2>/YR: CPT | Mod: CPTII,S$GLB,, | Performed by: INTERNAL MEDICINE

## 2022-10-19 PROCEDURE — 3288F PR FALLS RISK ASSESSMENT DOCUMENTED: ICD-10-PCS | Mod: CPTII,S$GLB,, | Performed by: INTERNAL MEDICINE

## 2022-10-19 PROCEDURE — 1157F ADVNC CARE PLAN IN RCRD: CPT | Mod: CPTII,S$GLB,, | Performed by: INTERNAL MEDICINE

## 2022-10-19 PROCEDURE — 99499 UNLISTED E&M SERVICE: CPT | Mod: S$GLB,,, | Performed by: INTERNAL MEDICINE

## 2022-10-19 PROCEDURE — 1100F PR PT FALLS ASSESS DOC 2+ FALLS/FALL W/INJURY/YR: ICD-10-PCS | Mod: CPTII,S$GLB,, | Performed by: INTERNAL MEDICINE

## 2022-10-19 PROCEDURE — 99214 OFFICE O/P EST MOD 30 MIN: CPT | Mod: S$GLB,,, | Performed by: INTERNAL MEDICINE

## 2022-10-19 PROCEDURE — 99999 PR PBB SHADOW E&M-EST. PATIENT-LVL III: CPT | Mod: PBBFAC,,,

## 2022-10-19 PROCEDURE — 99214 PR OFFICE/OUTPT VISIT, EST, LEVL IV, 30-39 MIN: ICD-10-PCS | Mod: S$GLB,,, | Performed by: INTERNAL MEDICINE

## 2022-10-19 PROCEDURE — 3078F DIAST BP <80 MM HG: CPT | Mod: CPTII,S$GLB,, | Performed by: INTERNAL MEDICINE

## 2022-10-19 PROCEDURE — 3074F PR MOST RECENT SYSTOLIC BLOOD PRESSURE < 130 MM HG: ICD-10-PCS | Mod: CPTII,S$GLB,, | Performed by: INTERNAL MEDICINE

## 2022-10-19 PROCEDURE — 1159F MED LIST DOCD IN RCRD: CPT | Mod: CPTII,S$GLB,, | Performed by: INTERNAL MEDICINE

## 2022-10-19 PROCEDURE — 99499 RISK ADDL DX/OHS AUDIT: ICD-10-PCS | Mod: S$GLB,,, | Performed by: INTERNAL MEDICINE

## 2022-10-19 PROCEDURE — 1157F PR ADVANCE CARE PLAN OR EQUIV PRESENT IN MEDICAL RECORD: ICD-10-PCS | Mod: CPTII,S$GLB,, | Performed by: INTERNAL MEDICINE

## 2022-10-19 NOTE — PROGRESS NOTES
INFECTIOUS DISEASE CLINIC  10/19/2022 10:23 AM    Subjective:      Chief Complaint:   No chief complaint on file.      History of Present Illness:    Patient Diego Gunter is a 82 y.o. male  with emphysema and cavitary MAC infection and antiphospholipid antibody syndrome on coumadin seen in clinic for MAC f/u.     Pt was admitted to the hospital on 4/28 for an epidural hematoma s/p spinal decompression 4/28, then sent to rehab. MAC therapy had been held during this time, but was resumed at last ID appointment on 6/15/22. He did not tolerate rifampin in the past due to GI upset. He is on ethambutol and azithromycin.     Reports compliance with azithro/ethambutol and aerobika and inhaled hypertonic saline. Tolerating meds. No vision changes reported. Has a chronic cough that is stable. Occasionally notices blood-tinged sputum.      Afb sputum cx 8/30/22 growing MAC     Labs 9/19/22- normal lft    Quant gold neg 2018    Quit smoking 57 y ago.     Had bronch 10/2018 for work up of lung mass     BRONCHIAL WASH CYTOLOGY:  NEGATIVE EXAM-NO NEOPLASIA IDENTIFIED  NO ORGANISMS IDENTIFIED WITH A GMS STAIN  THE CONTROL STAINED APPROPRIATEL        Organism     MYCOBACTERIUM AVIUM COMPLEX   Antibiotic    TED (mcg/mL)  Interpretation   ----------------------------------------------------------   Moxifloxacin             2       I   Clarithromycin           1       S   Amikacin                64   Streptomycin           >64   Linezolid               64       R   Ethambutol              16   Rifampin                 8   Rifabutin           <=0.25         AFB cultures 10/7/21, 10/28/21, 11/18/21, 12/9/21 all positive MAC     CT 11/2021  1. Continued evolution of previously described right upper lobe cavitary lesion measuring approximately 3.5 x 4.3 x 5.7-cm (previously 3.3 x 4.4 x 5.1-cm). Similar to minimal interval decrease in the amount of surrounding wall thickness, consolidation and surrounding scattered small consolidative  multifocal airspace opacities.  Mediastinal lymphadenopathy, not significantly changed.  2. Symmetric moderate-severe centrilobular emphysematous changes with basoapical gradient, not significantly changed.      Review of Symptoms:  Constitutional: Denies fevers, chills, or weakness.  ENT: Denies dysphagia, nasal discharge, ear pain or discharge.  Cardiovascular: Denies chest pain, palpitations, orthopnea, or claudication.  Respiratory: Denies shortness of breath, cough, hemoptysis, or wheezing.  GI: Denies nausea/vomitting, hematochezia, melena, abd pain, or changes in appetite.  : Denies dysuria, incontinence, or hematuria.  Musculoskeletal: Denies joint pain or myalgias.  Skin/breast: Denies rashes, lumps, lesions, or discharge.  Neurologic: Denies headache, dizziness, vertigo, or paresthesias.    Past Medical History:   Diagnosis Date    Antiphospholipid syndrome 11/19/2021    Dysphagia     Hx of colonic polyps     followed by GI. Dr. Pham    Hyperlipidemia LDL goal <100     Overweight(278.02)     Prostate cancer september 2011    followed by urology, Dr. Rogers    Type II or unspecified type diabetes mellitus without mention of complication, not stated as uncontrolled     diet controlled       Past Surgical History:   Procedure Laterality Date    BRONCHOSCOPY N/A 10/15/2018    Procedure: BRONCHOSCOPY;  Surgeon: Pratibha Diagnostic Provider;  Location: [facility] OR [address] [place] [address];  Service: Anesthesiology;  Laterality: N/A;    CATARACT EXTRACTION, BILATERAL  2014    POSTERIOR CERVICAL LAMINECTOMY Bilateral 4/28/2022    Procedure: LAMINECTOMY, SPINE, CERVICAL, POSTERIOR APPROACH C3-6;  Surgeon: Carlos Miller MD;  Location: [facility] OR [address] [place] [address];  Service: Neurosurgery;  Laterality: Bilateral;    PROSTATECTOMY      REPAIR, HERNIA, INGUINAL, WITHOUT HISTORY OF PRIOR REPAIR, AGE 5 YEARS OR OLDER Right 10/10/2022    Procedure: REPAIR, HERNIA, INGUINAL, WITHOUT HISTORY OF PRIOR REPAIR, AGE 5 YEARS OR OLDER;  Surgeon: Dario ROBERT  MD Sierra;  Location: [facility] OR [address] [place] [address];  Service: General;  Laterality: Right;  open right inguinal hernia repair with mesh       Family History   Problem Relation Age of Onset    Colon cancer Mother     Diabetes Mother     Heart disease Father     Mental illness Sister     Diabetes Sister     Other Brother         polio as a child    Pulmonary fibrosis Brother     Diabetes Brother     Myasthenia gravis Brother     Parkinsonism Brother     Diabetes Brother     Dementia Brother     Lung cancer Brother         smoker    Diabetes Maternal Aunt     Diabetes Other     Esophageal cancer Neg Hx        Social History     Socioeconomic History    Marital status:     Number of children: 2   Occupational History    Occupation: tool    Tobacco Use    Smoking status: Former     Packs/day: 0.25     Years: 5.00     Pack years: 1.25     Types: Cigarettes     Quit date: 10/2/1962     Years since quittin.0    Smokeless tobacco: Former    Tobacco comments:     quit age 23   Substance and Sexual Activity    Alcohol use: Not Currently    Drug use: No    Sexual activity: Yes     Partners: Female     Social Determinants of Health     Financial Resource Strain: Low Risk     Difficulty of Paying Living Expenses: Not hard at all   Food Insecurity: No Food Insecurity    Worried About Running Out of Food in the Last Year: Never true    Ran Out of Food in the Last Year: Never true   Transportation Needs: No Transportation Needs    Lack of Transportation (Medical): No    Lack of Transportation (Non-Medical): No   Physical Activity: Insufficiently Active    Days of Exercise per Week: 5 days    Minutes of Exercise per Session: 10 min   Stress: No Stress Concern Present    Feeling of Stress : Not at all   Social Connections: Moderately Isolated    Frequency of Communication with Friends and Family: More than three times a week    Frequency of Social Gatherings with Friends and Family: Once a week    Attends Anabaptism  Services: Never    Active Member of Clubs or Organizations: No    Attends Club or Organization Meetings: Never    Marital Status:    Housing Stability: Low Risk     Unable to Pay for Housing in the Last Year: No    Number of Places Lived in the Last Year: 1    Unstable Housing in the Last Year: No       Review of patient's allergies indicates:  No Known Allergies      Objective:   VS (24h):   Vitals:    10/19/22 0942   BP: 121/78   Pulse: 76       General: Afebrile, alert, comfortable, no acute distress.   HEENT: TERELL. EOMI, no scleral icterus.   Pulmonary: Non labored,clear to auscultation A/P/L. No wheezing, crackles, or rhonchi.  Cardiac: normal S1 & S2 w/o rubs/murmurs/gallops.   Abdominal: Non-tender, non-distended.  Extremities: Moves all extremities x 4. No peripheral edema.  Skin: No jaundice, rashes, or visible lesions.   Neurological:  Alert and oriented x 4.     Labs:    Glucose   Date Value Ref Range Status   09/19/2022 106 70 - 110 mg/dL Final   09/16/2022 88 70 - 110 mg/dL Final   05/13/2022 97 70 - 110 mg/dL Final       Calcium   Date Value Ref Range Status   09/19/2022 9.9 8.7 - 10.5 mg/dL Final   09/16/2022 9.6 8.7 - 10.5 mg/dL Final   05/13/2022 9.1 8.7 - 10.5 mg/dL Final       Albumin   Date Value Ref Range Status   09/19/2022 4.4 3.5 - 5.2 g/dL Final   09/16/2022 4.1 3.5 - 5.2 g/dL Final   05/13/2022 3.3 (L) 3.5 - 5.2 g/dL Final       Total Protein   Date Value Ref Range Status   09/19/2022 7.6 6.0 - 8.4 g/dL Final   09/16/2022 7.0 6.0 - 8.4 g/dL Final   05/13/2022 6.5 6.0 - 8.4 g/dL Final       Sodium   Date Value Ref Range Status   09/19/2022 138 136 - 145 mmol/L Final   09/16/2022 141 136 - 145 mmol/L Final   05/13/2022 140 136 - 145 mmol/L Final   05/13/2022 141 136 - 145 mmol/L Final       Potassium   Date Value Ref Range Status   09/19/2022 4.1 3.5 - 5.1 mmol/L Final   09/16/2022 4.2 3.5 - 5.1 mmol/L Final   05/13/2022 4.3 3.5 - 5.1 mmol/L Final       CO2   Date Value Ref Range  Status   09/19/2022 26 23 - 29 mmol/L Final   09/16/2022 25 23 - 29 mmol/L Final   05/13/2022 22 (L) 23 - 29 mmol/L Final       Chloride   Date Value Ref Range Status   09/19/2022 102 95 - 110 mmol/L Final   09/16/2022 107 95 - 110 mmol/L Final   05/13/2022 107 95 - 110 mmol/L Final       BUN   Date Value Ref Range Status   09/19/2022 14 8 - 23 mg/dL Final   09/16/2022 9 8 - 23 mg/dL Final   05/13/2022 15 8 - 23 mg/dL Final       Creatinine   Date Value Ref Range Status   09/19/2022 0.9 0.5 - 1.4 mg/dL Final   09/16/2022 1.0 0.5 - 1.4 mg/dL Final   09/09/2022 1.1 0.5 - 1.4 mg/dL Final       Alkaline Phosphatase   Date Value Ref Range Status   09/19/2022 74 55 - 135 U/L Final   09/16/2022 57 55 - 135 U/L Final   05/13/2022 98 55 - 135 U/L Final       ALT   Date Value Ref Range Status   09/19/2022 23 10 - 44 U/L Final   09/16/2022 20 10 - 44 U/L Final   05/13/2022 66 (H) 10 - 44 U/L Final       AST   Date Value Ref Range Status   09/19/2022 22 10 - 40 U/L Final   09/16/2022 23 10 - 40 U/L Final   05/13/2022 32 10 - 40 U/L Final       Total Bilirubin   Date Value Ref Range Status   09/19/2022 0.4 0.1 - 1.0 mg/dL Final     Comment:     For infants and newborns, interpretation of results should be based  on gestational age, weight and in agreement with clinical  observations.    Premature Infant recommended reference ranges:  Up to 24 hours.............<8.0 mg/dL  Up to 48 hours............<12.0 mg/dL  3-5 days..................<15.0 mg/dL  6-29 days.................<15.0 mg/dL     09/16/2022 0.4 0.1 - 1.0 mg/dL Final     Comment:     For infants and newborns, interpretation of results should be based  on gestational age, weight and in agreement with clinical  observations.    Premature Infant recommended reference ranges:  Up to 24 hours.............<8.0 mg/dL  Up to 48 hours............<12.0 mg/dL  3-5 days..................<15.0 mg/dL  6-29 days.................<15.0 mg/dL     05/13/2022 0.5 0.1 - 1.0 mg/dL Final      Comment:     For infants and newborns, interpretation of results should be based  on gestational age, weight and in agreement with clinical  observations.    Premature Infant recommended reference ranges:  Up to 24 hours.............<8.0 mg/dL  Up to 48 hours............<12.0 mg/dL  3-5 days..................<15.0 mg/dL  6-29 days.................<15.0 mg/dL         WBC   Date Value Ref Range Status   09/19/2022 10.41 3.90 - 12.70 K/uL Final   09/16/2022 5.54 3.90 - 12.70 K/uL Final   07/13/2022 6.09 3.90 - 12.70 K/uL Final       Hemoglobin   Date Value Ref Range Status   09/19/2022 13.3 (L) 14.0 - 18.0 g/dL Final   09/16/2022 12.1 (L) 14.0 - 18.0 g/dL Final   07/13/2022 12.6 (L) 14.0 - 18.0 g/dL Final       POC Hematocrit   Date Value Ref Range Status   04/28/2022 34 (L) 36 - 54 %PCV Final   04/28/2022 42 36 - 54 %PCV Final     Hematocrit   Date Value Ref Range Status   09/19/2022 40.3 40.0 - 54.0 % Final   09/16/2022 37.2 (L) 40.0 - 54.0 % Final   07/13/2022 39.6 (L) 40.0 - 54.0 % Final       MCV   Date Value Ref Range Status   09/19/2022 81 (L) 82 - 98 fL Final   09/16/2022 82 82 - 98 fL Final   07/13/2022 86 82 - 98 fL Final       Platelets   Date Value Ref Range Status   09/19/2022 275 150 - 450 K/uL Final   09/16/2022 232 150 - 450 K/uL Final   07/13/2022 279 150 - 450 K/uL Final       Lab Results   Component Value Date    CHOL 116 (L) 04/28/2022    CHOL 116 (L) 03/07/2022    CHOL 127 02/25/2021       Lab Results   Component Value Date    HDL 36 (L) 04/28/2022    HDL 43 03/07/2022    HDL 41 02/25/2021       Lab Results   Component Value Date    LDLCALC 62.4 (L) 04/28/2022    LDLCALC 54.4 (L) 03/07/2022    LDLCALC 67.4 02/25/2021       Lab Results   Component Value Date    TRIG 88 04/28/2022    TRIG 93 03/07/2022    TRIG 93 02/25/2021       Lab Results   Component Value Date    CHOLHDL 31.0 04/28/2022    CHOLHDL 37.1 03/07/2022    CHOLHDL 32.3 02/25/2021       RPR   Date Value Ref Range Status   05/08/2022  Non-reactive Non-reactive Final   10/21/2019 Non-reactive Non-reactive Final     No results found for: QUANTIFERON    Medications:  Current Outpatient Medications on File Prior to Visit   Medication Sig Dispense Refill    aspirin (ECOTRIN) 81 MG EC tablet Take 81 mg by mouth once daily.      azithromycin (ZITHROMAX) 500 MG tablet Take 500 mg by mouth once daily.      b complex vitamins capsule Take 1 capsule by mouth once daily.      ethambutoL (MYAMBUTOL) 400 MG Tab Take 2 tablets (800 mg total) by mouth once daily. 60 tablet 5    HYDROcodone-acetaminophen (NORCO) 5-325 mg per tablet Take 1 tablet by mouth every 6 (six) hours as needed for Pain. 21 tablet 0    nebulizer and compressor (Vayusa COMPRESSOR SYSTEM) Marcy use to administer nebulized medication as directed 1 each 0    polyethylene glycol (GOLYTELY) 236-22.74-6.74 -5.86 gram suspension Day 1: Dose 1, 4000ml. Day 2: Dose 2, 4000ml. For total of 8000ml. 8000 mL 0    pravastatin (PRAVACHOL) 10 MG tablet Take 1 tablet (10 mg total) by mouth once daily. 90 tablet 0    sodium chloride 3% 3 % nebulizer solution USE 4 ML VIAL IN NEBULIZER  TWICE DAILY 480 mL 11    sodium chloride 7% 7 % nebulizer solution use one vial in nebulizer twice a day      [DISCONTINUED] omega-3 fatty acids 1,000 mg Cap Take 2 g by mouth.       No current facility-administered medications on file prior to visit.       Antibiotics:   Antibiotics (From admission, onward)      None            HIV: No components found for: HIV 1/2 AG/AB  Hepatitis C IgG: No components found for: HEPATITIS C  Syphilis:   RPR   Date Value Ref Range Status   05/08/2022 Non-reactive Non-reactive Final   10/21/2019 Non-reactive Non-reactive Final       Hepatitis A IgG: No components found for: HEPATITIS A IGG  Hepatitis Bc IgG: No components found for: HEPATITIS B CORE IGG  Hepatitis Bs IgG:  Quantiferon: No results found for: QUANTIFERON  VZV IgG: No components found for: VARICELLA IGG    No components found for:  SEDIMENTATION RATE  No components found for: C-REACTIVE PROTEIN      Microbiology x 7d:   Microbiology Results (last 7 days)       ** No results found for the last 168 hours. **            Immunization History   Administered Date(s) Administered    COVID-19, MRNA, LN-S, PF (Pfizer) (Purple Cap) 01/13/2021, 04/28/2021, 11/05/2021    Influenza (FLUAD) - Trivalent - Adjuvanted - PF (65+) 09/25/2019    Influenza - High Dose - PF (65 years and older) 09/18/2015, 10/04/2016, 10/23/2017, 09/21/2018    Influenza - Quadrivalent - High Dose - PF (65 years and older) 11/05/2021    Influenza - Quadrivalent - PF *Preferred* (6 months and older) 10/06/2014    Influenza A (H1N1) 2009 Monovalent - IM 01/22/2010    Influenza Split 10/10/2011, 10/06/2014    Pneumococcal Conjugate - 13 Valent 10/28/2015    Pneumococcal Polysaccharide - 23 Valent 07/12/2013    Tdap 03/08/2015    Zoster Recombinant 09/25/2019, 12/30/2019         Reviewed records today as well as relevant labs, cultures, and imaging    Assessment:     Pulmonary MAC, macrolide susceptible  Cavitary lung lesion  Nodular liver- noted on CT; US abdomen 10/2021 No compelling sonographic evidence of cirrhosis.  empysema  Antiphospholipid antibody syndrome on coumadin      Meets ATS/IDSA guidelines for treatment- cough, cavitary lung lesion, MAC from bronch sample. AFB cultures 10/7/21, 10/28/21, 11/18/21, 12/9/21 all positive MAC. CT 11/2021 with RUL cavitary lesion-3.5 x 4.3 x 5.7-cm     Risk factor-   structural: emphysema, bronchiectasis  functional: reflux        Qtc 420 9/19/22     No toxicities from antimicrobials  Extremely medically complex  Patient is high risk for infectious complications given medical comorbidities    Plan:     Continue antibiotics- daily ethambutol and azithromycin (started 12/2021).   -Didn't tolerate rifampin. Note amikacin vimal 64. Duration 1 year from clearance (anticpate >18 months of abx).     -Counseled extensively on antibiotic side effects.      -While on ethambutol- self vision screens daily. If blurry vision lasts 2 days in a row, patient known to stop ethambutol and call eye doctor for exam and to let us know that this happened.    -Discussed importance of airway clearance. continue Aerobika and nebulized hypertonic saline bid. Recommended decreasing duration of hot showers, avoid soil and water aerosols, use fans in shower area, avoid fine mist shower heads (heavy droplets better), avoid bubbling water (hot tubs, humidifiers), flush water heater frequently to avoid biofilm buildup     - will get flu shot this week     - repeat afb sputum -ordered    - Will monitor drug therapy for toxicity    - The following labs were ordered:    Orders Placed This Encounter   Procedures    CBC Auto Differential     Standing Status:   Future     Standing Expiration Date:   10/19/2023    Comprehensive Metabolic Panel     Standing Status:   Future     Standing Expiration Date:   10/19/2023       I have sent communication to the referring physician and/or primary care provider.     I spent a total of 30 minutes on the day of the visit. This includes face to face time and non-face to face time preparing to see the patient (eg, review of tests), obtaining and/or reviewing separately obtained history, documenting clinical information in the electronic or other health record, independently interpreting results, and communicating results to the patient/family/caregiver, or care coordination.    RTC in approx 4mths    Janette Marshall MD   Infectious Disease

## 2022-10-19 NOTE — TELEPHONE ENCOUNTER
Spoke with the patient and his wife, who state the patient noticed a hard lump on top of his incision that is painful to touch. Denies open wound, drainage, fever or chills. I asked if they could take a photo of the area and send it through the portal. Patient's wife states they are on their way to his infectious disease appointment but she will try. Patient also reports numbness/stiffness to his right leg from his hip to his knee. States he noticed it when getting out of bed this morning. Reports some improvement since but no resolution. Patient informed that what he is describing should not be related to his surgery from 10/10 and that its likely unrelated. Advised that if he may want to be evaluated for this as it could be sciatica but could also be something more serious. Patient and wife verbalized understanding.

## 2022-10-20 ENCOUNTER — TELEPHONE (OUTPATIENT)
Dept: ORTHOPEDICS | Facility: CLINIC | Age: 82
End: 2022-10-20
Payer: MEDICARE

## 2022-10-20 NOTE — TELEPHONE ENCOUNTER
Spoke to Pt's wife on 10/20 th @ 1:02 pm. Pt's wife stated that pt needed a earlier EMG appointment but chanfged her mind when she realized that 11/17 wasn't far away

## 2022-10-21 ENCOUNTER — TELEPHONE (OUTPATIENT)
Dept: INFECTIOUS DISEASES | Facility: HOSPITAL | Age: 82
End: 2022-10-21
Payer: MEDICARE

## 2022-10-21 ENCOUNTER — LAB VISIT (OUTPATIENT)
Dept: LAB | Facility: HOSPITAL | Age: 82
End: 2022-10-21
Attending: INTERNAL MEDICINE
Payer: MEDICARE

## 2022-10-21 DIAGNOSIS — A31.0 MAI (MYCOBACTERIUM AVIUM-INTRACELLULARE): ICD-10-CM

## 2022-10-21 LAB
ALBUMIN SERPL BCP-MCNC: 4 G/DL (ref 3.5–5.2)
ALP SERPL-CCNC: 65 U/L (ref 55–135)
ALT SERPL W/O P-5'-P-CCNC: 14 U/L (ref 10–44)
ANION GAP SERPL CALC-SCNC: 12 MMOL/L (ref 8–16)
AST SERPL-CCNC: 21 U/L (ref 10–40)
BASOPHILS # BLD AUTO: 0.05 K/UL (ref 0–0.2)
BASOPHILS NFR BLD: 0.8 % (ref 0–1.9)
BILIRUB SERPL-MCNC: 0.8 MG/DL (ref 0.1–1)
BUN SERPL-MCNC: 11 MG/DL (ref 8–23)
CALCIUM SERPL-MCNC: 9.5 MG/DL (ref 8.7–10.5)
CHLORIDE SERPL-SCNC: 106 MMOL/L (ref 95–110)
CO2 SERPL-SCNC: 24 MMOL/L (ref 23–29)
CREAT SERPL-MCNC: 1.1 MG/DL (ref 0.5–1.4)
DIFFERENTIAL METHOD: ABNORMAL
EOSINOPHIL # BLD AUTO: 0.2 K/UL (ref 0–0.5)
EOSINOPHIL NFR BLD: 2.7 % (ref 0–8)
ERYTHROCYTE [DISTWIDTH] IN BLOOD BY AUTOMATED COUNT: 15.2 % (ref 11.5–14.5)
EST. GFR  (NO RACE VARIABLE): >60 ML/MIN/1.73 M^2
GLUCOSE SERPL-MCNC: 106 MG/DL (ref 70–110)
HCT VFR BLD AUTO: 30 % (ref 40–54)
HGB BLD-MCNC: 9.7 G/DL (ref 14–18)
IMM GRANULOCYTES # BLD AUTO: 0.03 K/UL (ref 0–0.04)
IMM GRANULOCYTES NFR BLD AUTO: 0.5 % (ref 0–0.5)
LYMPHOCYTES # BLD AUTO: 2 K/UL (ref 1–4.8)
LYMPHOCYTES NFR BLD: 29.5 % (ref 18–48)
MCH RBC QN AUTO: 27.2 PG (ref 27–31)
MCHC RBC AUTO-ENTMCNC: 32.3 G/DL (ref 32–36)
MCV RBC AUTO: 84 FL (ref 82–98)
MONOCYTES # BLD AUTO: 0.8 K/UL (ref 0.3–1)
MONOCYTES NFR BLD: 11.9 % (ref 4–15)
NEUTROPHILS # BLD AUTO: 3.6 K/UL (ref 1.8–7.7)
NEUTROPHILS NFR BLD: 54.6 % (ref 38–73)
NRBC BLD-RTO: 0 /100 WBC
PLATELET # BLD AUTO: 396 K/UL (ref 150–450)
PMV BLD AUTO: 9.2 FL (ref 9.2–12.9)
POTASSIUM SERPL-SCNC: 3.9 MMOL/L (ref 3.5–5.1)
PROT SERPL-MCNC: 7.4 G/DL (ref 6–8.4)
RBC # BLD AUTO: 3.57 M/UL (ref 4.6–6.2)
SODIUM SERPL-SCNC: 142 MMOL/L (ref 136–145)
WBC # BLD AUTO: 6.65 K/UL (ref 3.9–12.7)

## 2022-10-21 PROCEDURE — 85025 COMPLETE CBC W/AUTO DIFF WBC: CPT | Performed by: INTERNAL MEDICINE

## 2022-10-21 PROCEDURE — 36415 COLL VENOUS BLD VENIPUNCTURE: CPT | Performed by: INTERNAL MEDICINE

## 2022-10-21 PROCEDURE — 80053 COMPREHEN METABOLIC PANEL: CPT | Performed by: INTERNAL MEDICINE

## 2022-10-21 NOTE — TELEPHONE ENCOUNTER
Discussed lab results with patient's wife. Noted to have a drop in hgb 13.3-->9.7. Pt has not had any blood BMs or hematuria. She does note he has been more fatigued recently. She will arrange f/u with PCP for anemia workup.

## 2022-10-26 ENCOUNTER — OFFICE VISIT (OUTPATIENT)
Dept: SURGERY | Facility: CLINIC | Age: 82
End: 2022-10-26
Payer: MEDICARE

## 2022-10-26 VITALS
DIASTOLIC BLOOD PRESSURE: 63 MMHG | HEIGHT: 69 IN | SYSTOLIC BLOOD PRESSURE: 132 MMHG | WEIGHT: 161.25 LBS | OXYGEN SATURATION: 98 % | BODY MASS INDEX: 23.88 KG/M2 | HEART RATE: 74 BPM

## 2022-10-26 DIAGNOSIS — Z87.19 S/P INGUINAL HERNIA REPAIR: Primary | ICD-10-CM

## 2022-10-26 DIAGNOSIS — Z98.890 S/P INGUINAL HERNIA REPAIR: Primary | ICD-10-CM

## 2022-10-26 PROCEDURE — 99024 POSTOP FOLLOW-UP VISIT: CPT | Mod: S$GLB,,, | Performed by: STUDENT IN AN ORGANIZED HEALTH CARE EDUCATION/TRAINING PROGRAM

## 2022-10-26 PROCEDURE — 1159F PR MEDICATION LIST DOCUMENTED IN MEDICAL RECORD: ICD-10-PCS | Mod: CPTII,S$GLB,, | Performed by: STUDENT IN AN ORGANIZED HEALTH CARE EDUCATION/TRAINING PROGRAM

## 2022-10-26 PROCEDURE — 3078F DIAST BP <80 MM HG: CPT | Mod: CPTII,S$GLB,, | Performed by: STUDENT IN AN ORGANIZED HEALTH CARE EDUCATION/TRAINING PROGRAM

## 2022-10-26 PROCEDURE — 1160F RVW MEDS BY RX/DR IN RCRD: CPT | Mod: CPTII,S$GLB,, | Performed by: STUDENT IN AN ORGANIZED HEALTH CARE EDUCATION/TRAINING PROGRAM

## 2022-10-26 PROCEDURE — 1157F PR ADVANCE CARE PLAN OR EQUIV PRESENT IN MEDICAL RECORD: ICD-10-PCS | Mod: CPTII,S$GLB,, | Performed by: STUDENT IN AN ORGANIZED HEALTH CARE EDUCATION/TRAINING PROGRAM

## 2022-10-26 PROCEDURE — 1101F PT FALLS ASSESS-DOCD LE1/YR: CPT | Mod: CPTII,S$GLB,, | Performed by: STUDENT IN AN ORGANIZED HEALTH CARE EDUCATION/TRAINING PROGRAM

## 2022-10-26 PROCEDURE — 1160F PR REVIEW ALL MEDS BY PRESCRIBER/CLIN PHARMACIST DOCUMENTED: ICD-10-PCS | Mod: CPTII,S$GLB,, | Performed by: STUDENT IN AN ORGANIZED HEALTH CARE EDUCATION/TRAINING PROGRAM

## 2022-10-26 PROCEDURE — 3075F SYST BP GE 130 - 139MM HG: CPT | Mod: CPTII,S$GLB,, | Performed by: STUDENT IN AN ORGANIZED HEALTH CARE EDUCATION/TRAINING PROGRAM

## 2022-10-26 PROCEDURE — 99024 PR POST-OP FOLLOW-UP VISIT: ICD-10-PCS | Mod: S$GLB,,, | Performed by: STUDENT IN AN ORGANIZED HEALTH CARE EDUCATION/TRAINING PROGRAM

## 2022-10-26 PROCEDURE — 99999 PR PBB SHADOW E&M-EST. PATIENT-LVL III: CPT | Mod: PBBFAC,,, | Performed by: STUDENT IN AN ORGANIZED HEALTH CARE EDUCATION/TRAINING PROGRAM

## 2022-10-26 PROCEDURE — 1101F PR PT FALLS ASSESS DOC 0-1 FALLS W/OUT INJ PAST YR: ICD-10-PCS | Mod: CPTII,S$GLB,, | Performed by: STUDENT IN AN ORGANIZED HEALTH CARE EDUCATION/TRAINING PROGRAM

## 2022-10-26 PROCEDURE — 3078F PR MOST RECENT DIASTOLIC BLOOD PRESSURE < 80 MM HG: ICD-10-PCS | Mod: CPTII,S$GLB,, | Performed by: STUDENT IN AN ORGANIZED HEALTH CARE EDUCATION/TRAINING PROGRAM

## 2022-10-26 PROCEDURE — 1159F MED LIST DOCD IN RCRD: CPT | Mod: CPTII,S$GLB,, | Performed by: STUDENT IN AN ORGANIZED HEALTH CARE EDUCATION/TRAINING PROGRAM

## 2022-10-26 PROCEDURE — 1157F ADVNC CARE PLAN IN RCRD: CPT | Mod: CPTII,S$GLB,, | Performed by: STUDENT IN AN ORGANIZED HEALTH CARE EDUCATION/TRAINING PROGRAM

## 2022-10-26 PROCEDURE — 99999 PR PBB SHADOW E&M-EST. PATIENT-LVL III: ICD-10-PCS | Mod: PBBFAC,,, | Performed by: STUDENT IN AN ORGANIZED HEALTH CARE EDUCATION/TRAINING PROGRAM

## 2022-10-26 PROCEDURE — 3288F PR FALLS RISK ASSESSMENT DOCUMENTED: ICD-10-PCS | Mod: CPTII,S$GLB,, | Performed by: STUDENT IN AN ORGANIZED HEALTH CARE EDUCATION/TRAINING PROGRAM

## 2022-10-26 PROCEDURE — 3075F PR MOST RECENT SYSTOLIC BLOOD PRESS GE 130-139MM HG: ICD-10-PCS | Mod: CPTII,S$GLB,, | Performed by: STUDENT IN AN ORGANIZED HEALTH CARE EDUCATION/TRAINING PROGRAM

## 2022-10-26 PROCEDURE — 3288F FALL RISK ASSESSMENT DOCD: CPT | Mod: CPTII,S$GLB,, | Performed by: STUDENT IN AN ORGANIZED HEALTH CARE EDUCATION/TRAINING PROGRAM

## 2022-10-28 NOTE — PROGRESS NOTES
"[facility]  General Surgery  Post-Operative Note    Subjective:       Diego Gunter is an 82 yr old man who presents to the clinic 2 weeks following right inguinal hernia repair with mesh. He is eating a regular diet without difficulty. Bowel movements are  niormal .  Pain is controlled without any medications. There was some initial concerns about some swelling at the incision site. This is improving. He has had no fevers, chills, difficulty urinating, or bruising at the surgical site.     Objective:      /63 (BP Location: Left arm, Patient Position: Sitting)   Pulse 74   Ht 5' 9" (1.753 m)   Wt 73.1 kg (161 lb 4.3 oz)   SpO2 98%   BMI 23.81 kg/m²     General:  alert, appears stated age, and cooperative   Abdomen: soft, bowel sounds active, non-tender   Incision:   healing well, no drainage, no erythema, no hernia, mild edema and small seroma noted, no dehiscence, incision well approximated, no recurrent bulge      Pathology:  Soft tissue, right inguinal hernia sac, excision:   - Benign fibroadipose tissue with mesothelial lining, reactive changes,   inflammation consistent with hernia sac.    Assessment:     Diego Gunter is an 82 yr old M doing well s/p right inguinal hernia repair with mesh    Plan:   1. Continue any current medications.  2. Wound care discussed. Still has some post-op swelling and mild seroma  3. Return to full duty in 4 weeks.  4. Follow up as needed      Dario Esquivel MD  General Surgery and Surgical Critical Care  [facility]  "

## 2022-11-01 ENCOUNTER — TELEPHONE (OUTPATIENT)
Dept: ENDOSCOPY | Facility: HOSPITAL | Age: 82
End: 2022-11-01
Payer: MEDICARE

## 2022-11-01 LAB — ACID FAST SUSCEPTIBILITY, SLOW GROWER: ABNORMAL

## 2022-11-01 NOTE — TELEPHONE ENCOUNTER
----- Message from Ansley Tang sent at 11/1/2022  2:58 PM CDT -----  Type: Patient Call Back    Who called:Self    What is the request in detail: Pt was just released from the ED. Pt did not receive colonoscopy.    Can the clinic reply by [facility]? no    Would the patient rather a call back or a response via [facility]? Call back    Best call back number: [phone] (home)       Additional Information:

## 2022-11-01 NOTE — TELEPHONE ENCOUNTER
He has not had colonoscopy yet. He has an appointment on Friday. Should he postpone clinic appointment?

## 2022-11-02 NOTE — TELEPHONE ENCOUNTER
MD Liz Bond MA  Caller: Unspecified (Yesterday,  4:17 PM)  Hi   Would have him come to the clinic appointment given he ended up having an inguinal hernia repair recently and I will discuss with him how his bowel movements are now and if he still wants to proceed with colonoscopy given his age and symptoms may now be improved after hernia repair     Thanks   Charmaine     Patient's wife informed.

## 2022-11-04 ENCOUNTER — LAB VISIT (OUTPATIENT)
Dept: LAB | Facility: HOSPITAL | Age: 82
End: 2022-11-04
Attending: INTERNAL MEDICINE
Payer: MEDICARE

## 2022-11-04 ENCOUNTER — OFFICE VISIT (OUTPATIENT)
Dept: GASTROENTEROLOGY | Facility: CLINIC | Age: 82
End: 2022-11-04
Payer: MEDICARE

## 2022-11-04 ENCOUNTER — TELEPHONE (OUTPATIENT)
Dept: GASTROENTEROLOGY | Facility: CLINIC | Age: 82
End: 2022-11-04

## 2022-11-04 VITALS
WEIGHT: 160.5 LBS | BODY MASS INDEX: 23.77 KG/M2 | DIASTOLIC BLOOD PRESSURE: 78 MMHG | HEIGHT: 69 IN | SYSTOLIC BLOOD PRESSURE: 124 MMHG | HEART RATE: 69 BPM

## 2022-11-04 DIAGNOSIS — D64.9 ANEMIA, UNSPECIFIED TYPE: Primary | ICD-10-CM

## 2022-11-04 DIAGNOSIS — D64.9 ANEMIA, UNSPECIFIED TYPE: ICD-10-CM

## 2022-11-04 LAB
BASOPHILS # BLD AUTO: 0.07 K/UL (ref 0–0.2)
BASOPHILS NFR BLD: 1.2 % (ref 0–1.9)
DIFFERENTIAL METHOD: ABNORMAL
EOSINOPHIL # BLD AUTO: 0.1 K/UL (ref 0–0.5)
EOSINOPHIL NFR BLD: 1.5 % (ref 0–8)
ERYTHROCYTE [DISTWIDTH] IN BLOOD BY AUTOMATED COUNT: 15.6 % (ref 11.5–14.5)
FERRITIN SERPL-MCNC: 297 NG/ML (ref 20–300)
HCT VFR BLD AUTO: 31.2 % (ref 40–54)
HGB BLD-MCNC: 10.1 G/DL (ref 14–18)
IMM GRANULOCYTES # BLD AUTO: 0.02 K/UL (ref 0–0.04)
IMM GRANULOCYTES NFR BLD AUTO: 0.3 % (ref 0–0.5)
IRON SERPL-MCNC: 54 UG/DL (ref 45–160)
LYMPHOCYTES # BLD AUTO: 1.6 K/UL (ref 1–4.8)
LYMPHOCYTES NFR BLD: 27.3 % (ref 18–48)
MCH RBC QN AUTO: 28 PG (ref 27–31)
MCHC RBC AUTO-ENTMCNC: 32.4 G/DL (ref 32–36)
MCV RBC AUTO: 86 FL (ref 82–98)
MONOCYTES # BLD AUTO: 0.8 K/UL (ref 0.3–1)
MONOCYTES NFR BLD: 13.2 % (ref 4–15)
NEUTROPHILS # BLD AUTO: 3.4 K/UL (ref 1.8–7.7)
NEUTROPHILS NFR BLD: 56.5 % (ref 38–73)
NRBC BLD-RTO: 0 /100 WBC
PLATELET # BLD AUTO: 340 K/UL (ref 150–450)
PMV BLD AUTO: 9.1 FL (ref 9.2–12.9)
RBC # BLD AUTO: 3.61 M/UL (ref 4.6–6.2)
SATURATED IRON: 15 % (ref 20–50)
TOTAL IRON BINDING CAPACITY: 367 UG/DL (ref 250–450)
TRANSFERRIN SERPL-MCNC: 248 MG/DL (ref 200–375)
WBC # BLD AUTO: 6 K/UL (ref 3.9–12.7)

## 2022-11-04 PROCEDURE — 1126F PR PAIN SEVERITY QUANTIFIED, NO PAIN PRESENT: ICD-10-PCS | Mod: CPTII,S$GLB,, | Performed by: INTERNAL MEDICINE

## 2022-11-04 PROCEDURE — 1126F AMNT PAIN NOTED NONE PRSNT: CPT | Mod: CPTII,S$GLB,, | Performed by: INTERNAL MEDICINE

## 2022-11-04 PROCEDURE — 36415 COLL VENOUS BLD VENIPUNCTURE: CPT | Performed by: INTERNAL MEDICINE

## 2022-11-04 PROCEDURE — 1157F ADVNC CARE PLAN IN RCRD: CPT | Mod: CPTII,S$GLB,, | Performed by: INTERNAL MEDICINE

## 2022-11-04 PROCEDURE — 3074F SYST BP LT 130 MM HG: CPT | Mod: CPTII,S$GLB,, | Performed by: INTERNAL MEDICINE

## 2022-11-04 PROCEDURE — 1159F PR MEDICATION LIST DOCUMENTED IN MEDICAL RECORD: ICD-10-PCS | Mod: CPTII,S$GLB,, | Performed by: INTERNAL MEDICINE

## 2022-11-04 PROCEDURE — 3288F PR FALLS RISK ASSESSMENT DOCUMENTED: ICD-10-PCS | Mod: CPTII,S$GLB,, | Performed by: INTERNAL MEDICINE

## 2022-11-04 PROCEDURE — 1159F MED LIST DOCD IN RCRD: CPT | Mod: CPTII,S$GLB,, | Performed by: INTERNAL MEDICINE

## 2022-11-04 PROCEDURE — 3078F PR MOST RECENT DIASTOLIC BLOOD PRESSURE < 80 MM HG: ICD-10-PCS | Mod: CPTII,S$GLB,, | Performed by: INTERNAL MEDICINE

## 2022-11-04 PROCEDURE — 85025 COMPLETE CBC W/AUTO DIFF WBC: CPT | Performed by: INTERNAL MEDICINE

## 2022-11-04 PROCEDURE — 1157F PR ADVANCE CARE PLAN OR EQUIV PRESENT IN MEDICAL RECORD: ICD-10-PCS | Mod: CPTII,S$GLB,, | Performed by: INTERNAL MEDICINE

## 2022-11-04 PROCEDURE — 3074F PR MOST RECENT SYSTOLIC BLOOD PRESSURE < 130 MM HG: ICD-10-PCS | Mod: CPTII,S$GLB,, | Performed by: INTERNAL MEDICINE

## 2022-11-04 PROCEDURE — 1101F PT FALLS ASSESS-DOCD LE1/YR: CPT | Mod: CPTII,S$GLB,, | Performed by: INTERNAL MEDICINE

## 2022-11-04 PROCEDURE — 3078F DIAST BP <80 MM HG: CPT | Mod: CPTII,S$GLB,, | Performed by: INTERNAL MEDICINE

## 2022-11-04 PROCEDURE — 82728 ASSAY OF FERRITIN: CPT | Performed by: INTERNAL MEDICINE

## 2022-11-04 PROCEDURE — 84466 ASSAY OF TRANSFERRIN: CPT | Performed by: INTERNAL MEDICINE

## 2022-11-04 PROCEDURE — 99999 PR PBB SHADOW E&M-EST. PATIENT-LVL IV: ICD-10-PCS | Mod: PBBFAC,,, | Performed by: INTERNAL MEDICINE

## 2022-11-04 PROCEDURE — 3288F FALL RISK ASSESSMENT DOCD: CPT | Mod: CPTII,S$GLB,, | Performed by: INTERNAL MEDICINE

## 2022-11-04 PROCEDURE — 99999 PR PBB SHADOW E&M-EST. PATIENT-LVL IV: CPT | Mod: PBBFAC,,, | Performed by: INTERNAL MEDICINE

## 2022-11-04 PROCEDURE — 99214 OFFICE O/P EST MOD 30 MIN: CPT | Mod: S$GLB,,, | Performed by: INTERNAL MEDICINE

## 2022-11-04 PROCEDURE — 1101F PR PT FALLS ASSESS DOC 0-1 FALLS W/OUT INJ PAST YR: ICD-10-PCS | Mod: CPTII,S$GLB,, | Performed by: INTERNAL MEDICINE

## 2022-11-04 PROCEDURE — 99214 PR OFFICE/OUTPT VISIT, EST, LEVL IV, 30-39 MIN: ICD-10-PCS | Mod: S$GLB,,, | Performed by: INTERNAL MEDICINE

## 2022-11-04 NOTE — PROGRESS NOTES
"[facility] Follow-Up Visit    Chief Complaint/Reason for follow-up:    PCP:   Portia Ferreira         HPI:  This is a 82 y.o. male last seen in GI clinic on 9/2022.  He has antiphospholipid syndrome now off of a coumadin, DM2, recent fall undergoing PT, spinal surgery c/b hematoma requiring evacuation.  He presented initially to discuss constipation.Reported change in bowel movements one month prior without any preceding events or changes.   Bowel movements every 4-5 days with use of enema. Stools are hard, "rocks" and requires straining. No associated blood or mucus.  Has associated right groin pain, intermittent. Denies family history of GI malignancy.  After his last visit, I recommended metamucil, miralax, CT abdomen and colonoscopy.  CT did not show any acute bowel changes. He later presented to the ED with worsening pain, CT showed right inguinal hernia with upstream dilation. His incarcerated hernia was reduced in the ED and he was scheduled for elective repair. He underwent right inguinal hernia repair on 10/10/2022.    Today, he presents with his wife.  He is healing well in terms of the right inguinal hernia repair. He is walking with a walker.  His bowel movements have improved; constipation improving. He has bowel movements daily, ranges from solid to small dunia. Stool is brown in color. Denies any overt GI bleeding. Occasional straining.  He has norco prescribed to him but not taking it.   Did not find relief with miralax, now taking dulcolax and fiber supplementation.  Hgb 13.3 to 9.7 ; this is after surgery but OP note does not report any significant blood loss.  He is not currently taking any iron supplementation.      Last colonoscopy 2020 with 4 mm polyp, internal hemorrhoids and diverticulosis.  EGD 2016 with gastritis. Biopsies negative for hpylori. Biopsies in esophagus negative (taken due to dysphagia at the time)  Denies tobacco use.  Denies alcohol use.  Denies drug " "use.  No previous abdominal surgeries.    Denies any dysphagia  Denies vomiting  Denies unintentional weight loss      ROS:  Constitutional: No fevers, chills, No weight loss  ENT: No allergies  CV: No chest pain  Pulm: No cough, No shortness of breath  Ophtho: No vision changes  GI: see HPI  Derm: No rash  Heme: No lymphadenopathy, No bruising  MSK: No arthritis  : No dysuria, No hematuria  Endo: No hot or cold intolerance  Neuro: No syncope, No seizure  Psych: No anxiety, No depression    PMH, PSH, SH, FH reviewed     All: Review of patient's allergies indicates:  No Known Allergies    Current Outpatient Rx   Medication Sig Dispense Refill    azithromycin (ZITHROMAX) 500 MG tablet Take 500 mg by mouth once daily.      b complex vitamins capsule Take 1 capsule by mouth once daily.      ethambutoL (MYAMBUTOL) 400 MG Tab Take 2 tablets (800 mg total) by mouth once daily. 60 tablet 5    HYDROcodone-acetaminophen (NORCO) 5-325 mg per tablet Take 1 tablet by mouth every 6 (six) hours as needed for Pain. 21 tablet 0    nebulizer and compressor ([employer] COMPRESSOR SYSTEM) Marcy use to administer nebulized medication as directed 1 each 0    polyethylene glycol (GOLYTELY) 236-22.74-6.74 -5.86 gram suspension Day 1: Dose 1, 4000ml. Day 2: Dose 2, 4000ml. For total of 8000ml. 8000 mL 0    pravastatin (PRAVACHOL) 10 MG tablet Take 1 tablet (10 mg total) by mouth once daily. 90 tablet 0    sodium chloride 3% 3 % nebulizer solution USE 4 ML VIAL IN NEBULIZER  TWICE DAILY 480 mL 11    sodium chloride 7% 7 % nebulizer solution use one vial in nebulizer twice a day      aspirin (ECOTRIN) 81 MG EC tablet Take 81 mg by mouth once daily.         Objective Findings:    Vital Signs:  /78   Pulse 69   Ht 5' 9" (1.753 m)   Wt 72.8 kg (160 lb 7.9 oz)   BMI 23.70 kg/m²   Body mass index is 23.7 kg/m².    Physical Exam:  General Appearance: well-appearing, no acute distress  Head:   normocephalic, without obvious abnormality  Eyes:  "   PERRL, EOMI  ENT: neck supple; moist mucus membranes  Lungs: clear to auscultation bilaterally  Heart:  regular rate and rhythm, S1, S2 normal, no murmurs heard  Abdomen: soft, non-tender, non-distended with bowel sounds in all four quadrants. No hepatosplenomegaly, ascites, or mass  Extremities: 2+ pulses, no clubbing, cyanosis or edema  Skin: no rash  Neurologic: no focal motor deficits      Labs:  Lab Results   Component Value Date    WBC 6.65 10/21/2022    HGB 9.7 (L) 10/21/2022    HCT 30.0 (L) 10/21/2022     10/21/2022    CHOL 116 (L) 04/28/2022    TRIG 88 04/28/2022    HDL 36 (L) 04/28/2022    ALT 14 10/21/2022    AST 21 10/21/2022     10/21/2022    K 3.9 10/21/2022     10/21/2022    CREATININE 1.1 10/21/2022    BUN 11 10/21/2022    CO2 24 10/21/2022    TSH 3.649 04/28/2022    PSA 0.02 10/28/2015    INR 1.1 09/19/2022    HGBA1C 5.7 (H) 04/28/2022       IMAGING/PROCEDURES  Colonoscopy 2020 (outside) internal hemorrhoids, diverticulosis left colon, 4 mm polyp.     EGD 2016      The examined duodenum was normal.   Impression:          - Normal esophagus.                        - Gastritis. Biopsied.                        - Normal examined duodenum.                        - Multiple biopsies were obtained in the middle                        third of the esophagus     1. GASTRIC BIOPSY, ANTRUM-CHRONIC INFLAMMATION. INTESTINAL METAPLASIA. A SPECIAL STAIN FOR HELICOBACTER PYLORI-LIKE IS BEING OBTAINED AND A SUPPLEMENTAL REPORT WILL FOLLOW 2. ESOPHAGEAL BIOPSY, MID ESOPHAGUS-NO SIGNIFICANT HISTOPATHOLOGIC ABNORMALITY       Assessment/Plan:  81 yo M presents to GI clinic for follow up. He is s/p right inguinal hernia repair 10/2022.      He initially saw me in 9/2022 for constipation, change in bowel movements. I suspect this is related to his prior surgery this year and rehab stay. However, we did discuss that malignancy could not be ruled out and we discussed colonoscopy. He was scheduled for  colonoscopy but this was ultimately delayed as he had an inguinal hernia that was repaired early October. He is now doing well from this standpoint.   His constipation has improved with dulcolax and fiber (miralax did not provide relief)  We again discussed risks/benefits of colonoscopy given change in bowel habits; he prefers to proceed with colonoscopy. Labs were reviewed and he has a new anemia. This anemia is new after his inguinal hernia repair however op note does not note significant blood loss. Therefore, will order EGD to evaluate anemia and he will have EGD/colonoscopy at the same time  Will repeat CBC along with iron studies in 1 month  Avoid NSAID use    Follow up with me in 3 months      Order summary:  Orders Placed This Encounter    Ferritin    IRON AND TIBC    CBC Auto Differential    Ambulatory referral/consult to Endo Procedure          Thank you so much for allowing me to participate in the care of Diego Whitt MD  Gastroenterology   [facility]

## 2022-11-04 NOTE — TELEPHONE ENCOUNTER
Received a message from Dr. Whitt to let Mr. Gunter know his results from his hemoglobin (blood count) is stable and she will like him to repeat it in 1 month - order has already been placed.  Mr. Gunter has been called, he was not available but his wife took the message. She asked for a reminder so a recall letter has been made for 1 month's lab work repeat.

## 2022-11-17 ENCOUNTER — PROCEDURE VISIT (OUTPATIENT)
Dept: NEUROLOGY | Facility: CLINIC | Age: 82
End: 2022-11-17
Payer: MEDICARE

## 2022-11-17 DIAGNOSIS — G56.21 CUBITAL TUNNEL SYNDROME ON RIGHT: ICD-10-CM

## 2022-11-17 DIAGNOSIS — G56.01 CARPAL TUNNEL SYNDROME ON RIGHT: ICD-10-CM

## 2022-11-17 PROCEDURE — 95886 MUSC TEST DONE W/N TEST COMP: CPT | Mod: S$GLB,,, | Performed by: PHYSICAL MEDICINE & REHABILITATION

## 2022-11-17 PROCEDURE — 95911 PR NERVE CONDUCTION STUDY; 9-10 STUDIES: ICD-10-PCS | Mod: S$GLB,,, | Performed by: PHYSICAL MEDICINE & REHABILITATION

## 2022-11-17 PROCEDURE — 95886 PR EMG COMPLETE, W/ NERVE CONDUCTION STUDIES, 5+ MUSCLES: ICD-10-PCS | Mod: S$GLB,,, | Performed by: PHYSICAL MEDICINE & REHABILITATION

## 2022-11-17 PROCEDURE — 95911 NRV CNDJ TEST 9-10 STUDIES: CPT | Mod: S$GLB,,, | Performed by: PHYSICAL MEDICINE & REHABILITATION

## 2022-11-17 NOTE — PROCEDURES
Test Date:  2022    Patient: diego castellon : 1940 Physician: Ti Edouard D.O.   ID#: [id number] SEX: Male Ref. Phys: Russo-Digeorge, Jamie L*     HPI: Diego Castellon is a 82 y.o.male who presents for NCS/EMG to evaluate right hand weakness.  Pt developed hand weakness (and global arm weakness) after epidural hematoma requiring C3-C6 laminectomy on 2022.  He has significant hand atrophy as well as shoulder girdle atrophy.            NCV & EMG Findings:  Evaluation of the left median motor nerve showed prolonged distal onset latency.  The right median motor and the right Ulnar (ADM) motor nerves showed prolonged distal onset latency and reduced amplitude.  The left median sensory and the right median sensory nerves showed prolonged distal peak latency and decreased conduction velocity.  All remaining nerves (as indicated in the following tables) were within normal limits.  Needle evaluation of the right deltoid, the right infraspinatus, and the right supraspinatus muscles showed increased motor unit duration and moderately increased polyphasic potentials.  The right First Dorsal Interosseous muscle showed increased insertional activity, moderately increased spontaneous activity, and diminished recruitment.  The right Abductor Pollicis Brevis muscle showed increased insertional activity, slightly increased spontaneous activity, increased motor unit amplitude, increased motor unit duration, moderately increased polyphasic potentials, and diminished recruitment.  The right Abductor Digiti Minimi muscle showed no volitional activity.  The left Abductor Pollicis Brevis muscle showed increased motor unit amplitude, increased motor unit duration, and diminished recruitment.  The right Biceps Brachii muscle showed increased motor unit amplitude and slightly increased polyphasic potentials.  All remaining muscles (as indicated in the following table) showed no evidence of electrical  instability.    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  Note that there is significant motor axonal loss and atrophy. This could be due to the ulnar mononeuropathy, or could be related to cervical issues at C8/T1 potentially.   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 Carpal tunnel syndrome).  There is motor axonal loss on the right.  As above, this could be related to the cervical issues or the median mononeuropathy.  There is active denervation.  This is graded as Severe in severity on the right, Moderate on the left.    Lastly, there are chronic neuropathic changes in the right C5/6 myotomes (as well as significant atrophy of some of these muscles), consistent with a chronic C5 and/or C6 cervical radiculopathy.  Did not check paraspinals given the history of posterior cervical surgery.  As mentioned above, some of the changes in the hand muscles may also be related to the cervical spine.         ___________________________  Ti Edouard D.O.        NCS+  Motor Nerve Results      Latency Amplitude F-Lat Segment Distance CV Comment   Site (ms) Norm (mV) Norm (ms)  (cm) (m/s) Norm    Left Median (APB)   Wrist *5.7  < 4.7 5.6  > 3.8  Wrist-Palm - - -    Elbow 10.2 - 5.6 -  Elbow-Wrist 25 56  > 47    Right Median (APB)   Palm 2.9 - 1.44 -         Wrist *5.5  < 4.7 *2.4  > 3.8  Wrist-Palm 8 31 -    Elbow 9.7 - 2.1 -  Elbow-Wrist 22 52  > 47    Left Ulnar (ADM)   Wrist 3.2  < 3.7 4.4  > 3.0         Bel Elbow 7.6 - 3.0 -  Bel Elbow-Wrist 24 55  > 52    Abv Elbow 9.7 - 3.2 -  Abv Elbow-Bel Elbow 11 52  > 43    Right Ulnar (ADM)   Wrist *5.6  < 3.7 *0.40  > 3.0         Bel Elbow 10.0 - 0.45 -  Bel Elbow-Wrist 25 57  > 52    Abv Elbow 12.2 - 0.44 -  Abv Elbow-Bel Elbow 12 55  > 43      Sensory Nerve Results      Latency (Peak) Amplitude (P-P) Segment Distance CV Comment   Site (ms) Norm (µV) Norm   (cm) (m/s) Norm    Left Median   Wrist-Dig II *6.5  < 4.0 21  > 8 Wrist-Dig II 14 *22  > 39    Right Median   Wrist-Dig II *5.9  < 4.0 16  > 8 Wrist-Dig II 14 *24  > 39    Left Ulnar   Wrist-Dig V 3.8  < 4.0 9  > 4 Wrist-Dig V 16 42  > 38    Right Ulnar   Wrist-Dig V 3.0  < 4.0 15  > 4 Wrist-Dig V 14 47  > 38    Right Radial   Forearm-Wrist 2.1  < 2.8 19  > 11 Forearm-Wrist 9 43 -      EMG+     Side Muscle Nerve Root Ins Act Fibs Psw Amp Dur Poly Recrt Int Pat Comment   Left APB Median C8-T1 Nml Nml Nml *Incr *>12ms 0 *Reduced Nml    Right Infraspin Suprascapular C5-C6 Nml Nml Nml Nml *>12ms *2+ Nml Nml    Right Deltoid Axillary C5-C6 Nml Nml Nml Nml *>12ms *2+ Nml Nml    Right Biceps Musculocut C5-C6 Nml Nml Nml *Incr Nml *1+ Nml Nml    Right Supraspin Suprascapular C5-C6 Nml Nml Nml Nml *>12ms *2+ Nml Nml    Right Pronator Teres Median C6-C7 Nml Nml Nml Nml Nml 0 Nml Nml    Right Triceps Radial C6-C8 Nml Nml Nml Nml Nml 0 Nml Nml    Right Flex dig prof (uln) Ulnar C8-T1 Nml Nml Nml Nml Nml 0 Nml Nml    Right FDI Ulnar C8-T1 *Incr *2+ *2+ Nml Nml 0 *Reduced Nml    Right APB Median C8-T1 *Incr *1+ *1+ *Incr *>12ms *2+ *Reduced Nml    Right ADM Ulnar C8-T1 Nml Nml Nml      no vol act           Waveforms:    Motor              Sensory

## 2022-11-21 ENCOUNTER — LAB VISIT (OUTPATIENT)
Dept: LAB | Facility: HOSPITAL | Age: 82
End: 2022-11-21
Attending: INTERNAL MEDICINE
Payer: MEDICARE

## 2022-11-21 DIAGNOSIS — A31.0 MYCOBACTERIUM AVIUM-INTRACELLULARE COMPLEX: ICD-10-CM

## 2022-11-21 PROCEDURE — 87118 MYCOBACTERIC IDENTIFICATION: CPT | Mod: 59 | Performed by: INTERNAL MEDICINE

## 2022-11-21 PROCEDURE — 87154 CUL TYP ID BLD PTHGN 6+ TRGT: CPT | Performed by: INTERNAL MEDICINE

## 2022-11-21 PROCEDURE — 87206 SMEAR FLUORESCENT/ACID STAI: CPT | Performed by: INTERNAL MEDICINE

## 2022-11-21 PROCEDURE — 87116 MYCOBACTERIA CULTURE: CPT | Performed by: INTERNAL MEDICINE

## 2022-11-21 PROCEDURE — 87015 SPECIMEN INFECT AGNT CONCNTJ: CPT | Performed by: INTERNAL MEDICINE

## 2022-11-23 LAB — ACID FAST SUSCEPTIBILITY, SLOW GROWER: ABNORMAL

## 2022-12-02 ENCOUNTER — PATIENT MESSAGE (OUTPATIENT)
Dept: RESEARCH | Facility: HOSPITAL | Age: 82
End: 2022-12-02
Payer: MEDICARE

## 2022-12-06 ENCOUNTER — TELEPHONE (OUTPATIENT)
Dept: PREADMISSION TESTING | Facility: HOSPITAL | Age: 82
End: 2022-12-06
Payer: MEDICARE

## 2022-12-06 ENCOUNTER — OFFICE VISIT (OUTPATIENT)
Dept: ORTHOPEDICS | Facility: CLINIC | Age: 82
End: 2022-12-06
Payer: MEDICARE

## 2022-12-06 VITALS — WEIGHT: 160 LBS | BODY MASS INDEX: 23.7 KG/M2 | HEIGHT: 69 IN

## 2022-12-06 DIAGNOSIS — G56.21 CUBITAL TUNNEL SYNDROME ON RIGHT: ICD-10-CM

## 2022-12-06 DIAGNOSIS — G56.21 GUYON SYNDROME, RIGHT: Primary | ICD-10-CM

## 2022-12-06 DIAGNOSIS — G56.00 CARPAL TUNNEL SYNDROME: ICD-10-CM

## 2022-12-06 DIAGNOSIS — G56.01 CARPAL TUNNEL SYNDROME ON RIGHT: ICD-10-CM

## 2022-12-06 PROCEDURE — 99999 PR PBB SHADOW E&M-EST. PATIENT-LVL III: CPT | Mod: PBBFAC,,, | Performed by: ORTHOPAEDIC SURGERY

## 2022-12-06 PROCEDURE — 1159F MED LIST DOCD IN RCRD: CPT | Mod: CPTII,S$GLB,, | Performed by: ORTHOPAEDIC SURGERY

## 2022-12-06 PROCEDURE — 1159F PR MEDICATION LIST DOCUMENTED IN MEDICAL RECORD: ICD-10-PCS | Mod: CPTII,S$GLB,, | Performed by: ORTHOPAEDIC SURGERY

## 2022-12-06 PROCEDURE — 99214 PR OFFICE/OUTPT VISIT, EST, LEVL IV, 30-39 MIN: ICD-10-PCS | Mod: S$GLB,,, | Performed by: ORTHOPAEDIC SURGERY

## 2022-12-06 PROCEDURE — 3288F PR FALLS RISK ASSESSMENT DOCUMENTED: ICD-10-PCS | Mod: CPTII,S$GLB,, | Performed by: ORTHOPAEDIC SURGERY

## 2022-12-06 PROCEDURE — 1101F PT FALLS ASSESS-DOCD LE1/YR: CPT | Mod: CPTII,S$GLB,, | Performed by: ORTHOPAEDIC SURGERY

## 2022-12-06 PROCEDURE — 1101F PR PT FALLS ASSESS DOC 0-1 FALLS W/OUT INJ PAST YR: ICD-10-PCS | Mod: CPTII,S$GLB,, | Performed by: ORTHOPAEDIC SURGERY

## 2022-12-06 PROCEDURE — 99214 OFFICE O/P EST MOD 30 MIN: CPT | Mod: S$GLB,,, | Performed by: ORTHOPAEDIC SURGERY

## 2022-12-06 PROCEDURE — 1125F AMNT PAIN NOTED PAIN PRSNT: CPT | Mod: CPTII,S$GLB,, | Performed by: ORTHOPAEDIC SURGERY

## 2022-12-06 PROCEDURE — 1157F PR ADVANCE CARE PLAN OR EQUIV PRESENT IN MEDICAL RECORD: ICD-10-PCS | Mod: CPTII,S$GLB,, | Performed by: ORTHOPAEDIC SURGERY

## 2022-12-06 PROCEDURE — 1157F ADVNC CARE PLAN IN RCRD: CPT | Mod: CPTII,S$GLB,, | Performed by: ORTHOPAEDIC SURGERY

## 2022-12-06 PROCEDURE — 99999 PR PBB SHADOW E&M-EST. PATIENT-LVL III: ICD-10-PCS | Mod: PBBFAC,,, | Performed by: ORTHOPAEDIC SURGERY

## 2022-12-06 PROCEDURE — 3288F FALL RISK ASSESSMENT DOCD: CPT | Mod: CPTII,S$GLB,, | Performed by: ORTHOPAEDIC SURGERY

## 2022-12-06 PROCEDURE — 1125F PR PAIN SEVERITY QUANTIFIED, PAIN PRESENT: ICD-10-PCS | Mod: CPTII,S$GLB,, | Performed by: ORTHOPAEDIC SURGERY

## 2022-12-06 RX ORDER — MUPIROCIN 20 MG/G
OINTMENT TOPICAL
Status: CANCELLED | OUTPATIENT
Start: 2022-12-06

## 2022-12-06 NOTE — PROGRESS NOTES
[facility]  History & Physical  Orthopedics    SUBJECTIVE:      Chief Complaint: Right hand swelling and numbness    Referring Provider: No ref. provider found     Dr. Lazo is the supervising physician for this encounter/patient    History of Present Illness:  Patient is a 82 y.o. right hand dominant male who presents today with complaints of right hand pain and swelling since April 2022. Family reports a spinal column bleed in April 2022 requiring surgery, while inpatient for this, started to develop hand issues. He reports swelling off/on, had US eval done in the ED 9/16/22 which was negative for any thrombus. He reports painful numbness/tingling into the hand, wakes up at night often with this, will also occur throughout the day.     Interval history December 6, 2022: The patient returns today with his family members.  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  He has had significant dysfunction particularly to the right upper extremity ever since that time and this includes constant numbness and tingling in the right index long ring and small fingers as well as significant dysfunction and weakness.  He was recently sent for an EMG and returns for these results and re-evaluation.  No new complaints.  He notes that he is no longer on any anticoagulation which he was previously on for his history of clotting and antiphospholipid syndrome.  No blood thinners these days.    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    Onset of symptoms/DOI was months.    Symptoms are aggravated by activity, movement, at night, and during the day.    Symptoms are alleviated by activity.    Symptoms consist of pain, swelling, decreased ROM, and numbness/tingling.    The patient rates their pain as a 5/10.    Attempted treatment(s) and/or interventions include activity modifications,  rest, physical and/or occupational therapy.     The patient denies any fevers, chills, N/V, D/C and presents for evaluation.       Past Medical History:   Diagnosis Date    Antiphospholipid syndrome 11/19/2021    Dysphagia     Hx of colonic polyps     followed by GI. Dr. Pham    Hyperlipidemia LDL goal <100     Overweight(278.02)     Prostate cancer september 2011    followed by urology, Dr. Rogers    Type II or unspecified type diabetes mellitus without mention of complication, not stated as uncontrolled     diet controlled     Past Surgical History:   Procedure Laterality Date    BRONCHOSCOPY N/A 10/15/2018    Procedure: BRONCHOSCOPY;  Surgeon: [facility] Diagnostic Provider;  Location: [facility] OR [address] [place] [address];  Service: Anesthesiology;  Laterality: N/A;    CATARACT EXTRACTION, BILATERAL  2014    POSTERIOR CERVICAL LAMINECTOMY Bilateral 4/28/2022    Procedure: LAMINECTOMY, SPINE, CERVICAL, POSTERIOR APPROACH C3-6;  Surgeon: Carlos Miller MD;  Location: [facility] OR [address] [place] [address];  Service: Neurosurgery;  Laterality: Bilateral;    PROSTATECTOMY      REPAIR, HERNIA, INGUINAL, WITHOUT HISTORY OF PRIOR REPAIR, AGE 5 YEARS OR OLDER Right 10/10/2022    Procedure: REPAIR, HERNIA, INGUINAL, WITHOUT HISTORY OF PRIOR REPAIR, AGE 5 YEARS OR OLDER;  Surgeon: Dario Esquivel MD;  Location: [facility] OR [address] [place] [address];  Service: General;  Laterality: Right;  open right inguinal hernia repair with mesh     Review of patient's allergies indicates:  No Known Allergies  Social History     Social History Narrative    Not on file     Family History   Problem Relation Age of Onset    Colon cancer Mother     Diabetes Mother     Heart disease Father     Mental illness Sister     Diabetes Sister     Other Brother         polio as a child    Pulmonary fibrosis Brother     Diabetes Brother     Myasthenia gravis Brother     Parkinsonism Brother     Diabetes Brother     Dementia Brother     Lung cancer Brother         smoker    Diabetes Maternal Aunt     Diabetes  "Other     Esophageal cancer Neg Hx          Current Outpatient Medications:     aspirin (ECOTRIN) 81 MG EC tablet, Take 81 mg by mouth once daily., Disp: , Rfl:     azithromycin (ZITHROMAX) 500 MG tablet, Take 500 mg by mouth once daily., Disp: , Rfl:     b complex vitamins capsule, Take 1 capsule by mouth once daily., Disp: , Rfl:     ethambutoL (MYAMBUTOL) 400 MG Tab, Take 2 tablets (800 mg total) by mouth once daily., Disp: 60 tablet, Rfl: 5    HYDROcodone-acetaminophen (NORCO) 5-325 mg per tablet, Take 1 tablet by mouth every 6 (six) hours as needed for Pain., Disp: 21 tablet, Rfl: 0    nebulizer and compressor ([employer] COMPRESSOR SYSTEM) Marcy, use to administer nebulized medication as directed, Disp: 1 each, Rfl: 0    polyethylene glycol (GOLYTELY) 236-22.74-6.74 -5.86 gram suspension, Day 1: Dose 1, 4000ml. Day 2: Dose 2, 4000ml. For total of 8000ml., Disp: 8000 mL, Rfl: 0    pravastatin (PRAVACHOL) 10 MG tablet, ONE TABLET BY MOUTH once a day, Disp: 90 tablet, Rfl: 0    sodium chloride 3% 3 % nebulizer solution, USE 4 ML VIAL IN NEBULIZER  TWICE DAILY, Disp: 480 mL, Rfl: 11    sodium chloride 7% 7 % nebulizer solution, use one vial in nebulizer twice a day, Disp: , Rfl:       Review of Systems:  Constitutional: no fever or chills  Eyes: no visual changes  ENT: no nasal congestion or sore throat  Respiratory: no cough or shortness of breath  Cardiovascular: no chest pain  Gastrointestinal: no nausea or vomiting, tolerating diet  Musculoskeletal: pain, soreness, numbness/tingling, and decreased ROM    OBJECTIVE:      Vital Signs (Most Recent):  Vitals:    12/06/22 0929   Weight: 72.6 kg (160 lb)   Height: 5' 9" (1.753 m)     Body mass index is 23.63 kg/m².      Physical Exam:  Constitutional: The patient appears well-developed and well-nourished. No distress.   Skin: No lesions appreciated  Head: Normocephalic and atraumatic.   Nose: Nose normal.   Ears: No deformities seen  Eyes: Conjunctivae and EOM are normal. "   Neck: No tracheal deviation present.   Cardiovascular: Normal rate and intact distal pulses.    Pulmonary/Chest: Effort normal. No respiratory distress.   Abdominal: There is no guarding.   Neurological: The patient is alert.   Psychiatric: The patient has a normal mood and affect.     Right Hand/Wrist Examination:    Observation/Inspection:  Swelling  Pos - generalized   Deformity  Index DIP amputation. Dupuytrens nodules noted palmar ring, ring and small finger flexion contractures noted  Discoloration  none     Scars   none    Atrophy  intrinsic/1st dorsal interosseous-moderate    HAND/WRIST EXAMINATION:  Finkelstein's Test   unable to assess/not assessed secondary to condition  WHAT Test    unable to assess/not assessed secondary to condition  Snuff box tenderness   unable to assess/not assessed secondary to condition  Sharp's Test    unable to assess/not assessed secondary to condition  Hook of Hamate Tenderness  unable to assess/not assessed secondary to condition  CMC grind    unable to assess/not assessed secondary to condition  Circumduction test   unable to assess/not assessed secondary to condition    Neurovascular Exam:  Digits WWP, brisk CR < 3s throughout  NVI motor/LTS to M/R/U nerves, radial pulse 2+ except for decreased light touch sensation at baseline to the right index long ring and small fingers; global weakness throughout all major muscle groups particularly to ulnar innervated intrinsic muscles; inability to cross the 2nd and 3rd digits.  Significant weakness with digital abduction and adduction as well as small finger opposition  Tinel's Test - Carpal Tunnel  POS  Tinel's Test - Cubital Tunnel  Pos  Phalen's Test    POS  Median Nerve Compression Test POS    ROM hand: decreased flexion of all digits. Small PIP 40 degree contracture, Long PIP 15 degree contracture.    ROM wrist full, painless    ROM elbow full, painless    Abdomen not guarded  Respirations nonlabored  Perfusion  intact    Diagnostic Results:     Imaging - I independently viewed the patient's imaging as well as the radiology report.      FINDINGS:  Prior amputation distal phalanx 2nd digit.     I see no acute fracture.     Osteophyte formation, subchondral sclerosis, subchondral cyst formation, and advanced narrowing 1st CMC joint.  Mild degenerative changes scattered in the interphalangeal joints.     Narrowing of the distal radioulnar joint.     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  Note that there is significant motor axonal loss and atrophy. This could be due to the ulnar mononeuropathy, or could be related to cervical issues at C8/T1 potentially.   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 Carpal tunnel syndrome).  There is motor axonal loss on the right.  As above, this could be related to the cervical issues or the median mononeuropathy.  There is active denervation.  This is graded as Severe in severity on the right, Moderate on the left.    Lastly, there are chronic neuropathic changes in the right C5/6 myotomes (as well as significant atrophy of some of these muscles), consistent with a chronic C5 and/or C6 cervical radiculopathy.  Did not check paraspinals given the history of posterior cervical surgery.  As mentioned above, some of the changes in the hand muscles may also be related to the cervical spine.          ___________________________  Ti Edouard D.O.    ASSESSMENT/PLAN:      82 y.o. yo male with Right carpal tunnel syndrome, cubital tunnel syndrome, Guyon's ulnar nerve compression, Dupuytren's contractures right hand paragraph  Plan: The patient and I had a thorough discussion today.  We discussed the working diagnosis as well as several other potential alternative diagnoses.  Treatment options were  discussed, both conservative and surgical.  Conservative treatment options would include things such as activity modifications, workplace modifications, a period of rest, oral vs topical OTC and prescription anti-inflammatory medications, occupational therapy, splinting/bracing, immobilization, corticosteroid injections, and others.  Surgical options were discussed as well.     At this time, the patient has significant neurologic dysfunction in the entire right upper extremity.  We had a very thorough, Avery, and extensive discussion that the etiology of this is likely multifactorial.  I do believe that the majority of his pathology lies in his cervical spinal issues but he certainly has strongly positive Tinel's at both the right wrist and elbow with EMG evidence of focal mononeuropathy of the right ulnar nerve at the wrist as well as the median nerve at the wrist.  I believe is reasonable to consider and proceed with a right carpal tunnel release, Guyon's ulnar nerve release, and cubital tunnel release.  He understands that his hand contractures are secondary to Dupuytren's which will not be addressed with this procedure.  Furthermore, we discussed explicitly that I can not guarantee him any particular outcome after these surgeries.  He may improve but he may not and he would like to proceed as planned.  We will proceed with right ulnar nerve decompression at the wrist and elbow as well as a right carpal tunnel release on January 11, 2023.  Preop clinic for optimization prior to surgery.    The patient has not responded to adequate non operative treatment at this time and/or non operative treatment is not indicated. Thus, the risks, benefits and alternatives to surgery were discussed with the patient in detail.  Specific risks include but are not limited to bleeding, infection, vessel and/or nerve damage, pain, numbness, tingling, complex regional pain syndrome, compartment syndrome, failure to return to  pre-injury and/or preoperative functional status, scar sensitivity, delayed healing, inability to return to work, pulley injury, tendon injury, bowstringing, partial and/or incomplete relief of symptoms, weakness, persistence of and/or worsening of symptoms, surgical failure, osteomyelitis, amputation, loss of function, stiffness, functional debility, dysfunction, decreased  strength, need for prolonged postoperative rehabilitation, need for further surgery, deep venous thrombosis, pulmonary embolism, arthritis and death.  The patient states an understanding and wishes to proceed with surgery.   All questions were answered.  No guarantees were implied or stated.  Written informed consent was obtained.      Please do not hesitate to reach out to us via email, phone, or MyChart with any questions, concerns, or feedback.

## 2022-12-06 NOTE — H&P
[facility]  History & Physical  Orthopedics     SUBJECTIVE:       Chief Complaint: Right hand swelling and numbness     Referring Provider: No ref. provider found      Dr. Lazo is the supervising physician for this encounter/patient     History of Present Illness:  Patient is a 82 y.o. right hand dominant male who presents today with complaints of right hand pain and swelling since April 2022. Family reports a spinal column bleed in April 2022 requiring surgery, while inpatient for this, started to develop hand issues. He reports swelling off/on, had US eval done in the ED 9/16/22 which was negative for any thrombus. He reports painful numbness/tingling into the hand, wakes up at night often with this, will also occur throughout the day.      Interval history December 6, 2022: The patient returns today with his family members.  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  He has had significant dysfunction particularly to the right upper extremity ever since that time and this includes constant numbness and tingling in the right index long ring and small fingers as well as significant dysfunction and weakness.  He was recently sent for an EMG and returns for these results and re-evaluation.  No new complaints.  He notes that he is no longer on any anticoagulation which he was previously on for his history of clotting and antiphospholipid syndrome.  No blood thinners these days.     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     Onset of symptoms/DOI was months.     Symptoms are aggravated by activity, movement, at night, and during the day.     Symptoms are alleviated by activity.     Symptoms consist of pain, swelling, decreased ROM, and numbness/tingling.     The patient rates their pain as a 5/10.     Attempted treatment(s) and/or interventions include activity  modifications, rest, physical and/or occupational therapy.     The patient denies any fevers, chills, N/V, D/C and presents for evaluation.             Past Medical History:   Diagnosis Date    Antiphospholipid syndrome 11/19/2021    Dysphagia      Hx of colonic polyps       followed by GI. Dr. Pham    Hyperlipidemia LDL goal <100      Overweight(278.02)      Prostate cancer september 2011     followed by urology, Dr. Rogers    Type II or unspecified type diabetes mellitus without mention of complication, not stated as uncontrolled       diet controlled            Past Surgical History:   Procedure Laterality Date    BRONCHOSCOPY N/A 10/15/2018     Procedure: BRONCHOSCOPY;  Surgeon: [facility] Diagnostic Provider;  Location: [facility] OR [address] [place] [address];  Service: Anesthesiology;  Laterality: N/A;    CATARACT EXTRACTION, BILATERAL   2014    POSTERIOR CERVICAL LAMINECTOMY Bilateral 4/28/2022     Procedure: LAMINECTOMY, SPINE, CERVICAL, POSTERIOR APPROACH C3-6;  Surgeon: Carlos Miller MD;  Location: [facility] OR [address] [place] [address];  Service: Neurosurgery;  Laterality: Bilateral;    PROSTATECTOMY        REPAIR, HERNIA, INGUINAL, WITHOUT HISTORY OF PRIOR REPAIR, AGE 5 YEARS OR OLDER Right 10/10/2022     Procedure: REPAIR, HERNIA, INGUINAL, WITHOUT HISTORY OF PRIOR REPAIR, AGE 5 YEARS OR OLDER;  Surgeon: Dario Esquivel MD;  Location: [facility] OR [address] [place] [address];  Service: General;  Laterality: Right;  open right inguinal hernia repair with mesh      Review of patient's allergies indicates:  No Known Allergies  Social History          Social History Narrative    Not on file            Family History   Problem Relation Age of Onset    Colon cancer Mother      Diabetes Mother      Heart disease Father      Mental illness Sister      Diabetes Sister      Other Brother           polio as a child    Pulmonary fibrosis Brother      Diabetes Brother      Myasthenia gravis Brother      Parkinsonism Brother      Diabetes Brother      Dementia Brother      Lung  "cancer Brother           smoker    Diabetes Maternal Aunt      Diabetes Other      Esophageal cancer Neg Hx              Current Outpatient Medications:     aspirin (ECOTRIN) 81 MG EC tablet, Take 81 mg by mouth once daily., Disp: , Rfl:     azithromycin (ZITHROMAX) 500 MG tablet, Take 500 mg by mouth once daily., Disp: , Rfl:     b complex vitamins capsule, Take 1 capsule by mouth once daily., Disp: , Rfl:     ethambutoL (MYAMBUTOL) 400 MG Tab, Take 2 tablets (800 mg total) by mouth once daily., Disp: 60 tablet, Rfl: 5    HYDROcodone-acetaminophen (NORCO) 5-325 mg per tablet, Take 1 tablet by mouth every 6 (six) hours as needed for Pain., Disp: 21 tablet, Rfl: 0    nebulizer and compressor ([employer] COMPRESSOR SYSTEM) Marcy, use to administer nebulized medication as directed, Disp: 1 each, Rfl: 0    polyethylene glycol (GOLYTELY) 236-22.74-6.74 -5.86 gram suspension, Day 1: Dose 1, 4000ml. Day 2: Dose 2, 4000ml. For total of 8000ml., Disp: 8000 mL, Rfl: 0    pravastatin (PRAVACHOL) 10 MG tablet, ONE TABLET BY MOUTH once a day, Disp: 90 tablet, Rfl: 0    sodium chloride 3% 3 % nebulizer solution, USE 4 ML VIAL IN NEBULIZER  TWICE DAILY, Disp: 480 mL, Rfl: 11    sodium chloride 7% 7 % nebulizer solution, use one vial in nebulizer twice a day, Disp: , Rfl:         Review of Systems:  Constitutional: no fever or chills  Eyes: no visual changes  ENT: no nasal congestion or sore throat  Respiratory: no cough or shortness of breath  Cardiovascular: no chest pain  Gastrointestinal: no nausea or vomiting, tolerating diet  Musculoskeletal: pain, soreness, numbness/tingling, and decreased ROM     OBJECTIVE:       Vital Signs (Most Recent):  Vitals       Vitals:     12/06/22 0929   Weight: 72.6 kg (160 lb)   Height: 5' 9" (1.753 m)         Body mass index is 23.63 kg/m².        Physical Exam:  Constitutional: The patient appears well-developed and well-nourished. No distress.   Skin: No lesions appreciated  Head: Normocephalic and " atraumatic.   Nose: Nose normal.   Ears: No deformities seen  Eyes: Conjunctivae and EOM are normal.   Neck: No tracheal deviation present.   Cardiovascular: Normal rate and intact distal pulses.    Pulmonary/Chest: Effort normal. No respiratory distress.   Abdominal: There is no guarding.   Neurological: The patient is alert.   Psychiatric: The patient has a normal mood and affect.      Right Hand/Wrist Examination:     Observation/Inspection:  Swelling                       Pos - generalized          Deformity                     Index DIP amputation. Dupuytrens nodules noted palmar ring, ring and small finger flexion contractures noted  Discoloration               none                  Scars                           none                  Atrophy                        intrinsic/1st dorsal interosseous-moderate     HAND/WRIST EXAMINATION:  Finkelstein's Test                                unable to assess/not assessed secondary to condition  WHAT Test                                         unable to assess/not assessed secondary to condition  Snuff box tenderness                          unable to assess/not assessed secondary to condition  Sharp's Test                                     unable to assess/not assessed secondary to condition  Hook of Hamate Tenderness              unable to assess/not assessed secondary to condition  CMC grind                                           unable to assess/not assessed secondary to condition  Circumduction test                              unable to assess/not assessed secondary to condition     Neurovascular Exam:  Digits WWP, brisk CR < 3s throughout  NVI motor/LTS to M/R/U nerves, radial pulse 2+ except for decreased light touch sensation at baseline to the right index long ring and small fingers; global weakness throughout all major muscle groups particularly to ulnar innervated intrinsic muscles; inability to cross the 2nd and 3rd digits.  Significant weakness  with digital abduction and adduction as well as small finger opposition  Tinel's Test - Carpal Tunnel                POS  Tinel's Test - Cubital Tunnel               Pos  Phalen's Test                                      POS  Median Nerve Compression Test       POS     ROM hand: decreased flexion of all digits. Small PIP 40 degree contracture, Long PIP 15 degree contracture.     ROM wrist full, painless    ROM elbow full, painless     Abdomen not guarded  Respirations nonlabored  Perfusion intact     Diagnostic Results:     Imaging - I independently viewed the patient's imaging as well as the radiology report.       FINDINGS:  Prior amputation distal phalanx 2nd digit.     I see no acute fracture.     Osteophyte formation, subchondral sclerosis, subchondral cyst formation, and advanced narrowing 1st CMC joint.  Mild degenerative changes scattered in the interphalangeal joints.     Narrowing of the distal radioulnar joint.     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  Note that there is significant motor axonal loss and atrophy. This could be due to the ulnar mononeuropathy, or could be related to cervical issues at C8/T1 potentially.   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 Carpal tunnel syndrome).  There is motor axonal loss on the right.  As above, this could be related to the cervical issues or the median mononeuropathy.  There is active denervation.  This is graded as Severe in severity on the right, Moderate on the left.    Lastly, there are chronic neuropathic changes in the right C5/6 myotomes (as well as significant atrophy of some of these muscles), consistent with a chronic C5 and/or C6 cervical radiculopathy.  Did not check paraspinals given the history of posterior cervical surgery.  As mentioned above, some of  the changes in the hand muscles may also be related to the cervical spine.          ___________________________  Ti Edouard D.O.     ASSESSMENT/PLAN:       82 y.o. yo male with Right carpal tunnel syndrome, cubital tunnel syndrome, Guyon's ulnar nerve compression, Dupuytren's contractures right hand paragraph  Plan: The patient and I had a thorough discussion today.  We discussed the working diagnosis as well as several other potential alternative diagnoses.  Treatment options were discussed, both conservative and surgical.  Conservative treatment options would include things such as activity modifications, workplace modifications, a period of rest, oral vs topical OTC and prescription anti-inflammatory medications, occupational therapy, splinting/bracing, immobilization, corticosteroid injections, and others.  Surgical options were discussed as well.      At this time, the patient has significant neurologic dysfunction in the entire right upper extremity.  We had a very thorough, Avery, and extensive discussion that the etiology of this is likely multifactorial.  I do believe that the majority of his pathology lies in his cervical spinal issues but he certainly has strongly positive Tinel's at both the right wrist and elbow with EMG evidence of focal mononeuropathy of the right ulnar nerve at the wrist as well as the median nerve at the wrist.  I believe is reasonable to consider and proceed with a right carpal tunnel release, Guyon's ulnar nerve release, and cubital tunnel release.  He understands that his hand contractures are secondary to Dupuytren's which will not be addressed with this procedure.  Furthermore, we discussed explicitly that I can not guarantee him any particular outcome after these surgeries.  He may improve but he may not and he would like to proceed as planned.  We will proceed with right ulnar nerve decompression at the wrist and elbow as well as a right carpal tunnel release on  January 11, 2023.  Preop clinic for optimization prior to surgery.     The patient has not responded to adequate non operative treatment at this time and/or non operative treatment is not indicated. Thus, the risks, benefits and alternatives to surgery were discussed with the patient in detail.  Specific risks include but are not limited to bleeding, infection, vessel and/or nerve damage, pain, numbness, tingling, complex regional pain syndrome, compartment syndrome, failure to return to pre-injury and/or preoperative functional status, scar sensitivity, delayed healing, inability to return to work, pulley injury, tendon injury, bowstringing, partial and/or incomplete relief of symptoms, weakness, persistence of and/or worsening of symptoms, surgical failure, osteomyelitis, amputation, loss of function, stiffness, functional debility, dysfunction, decreased  strength, need for prolonged postoperative rehabilitation, need for further surgery, deep venous thrombosis, pulmonary embolism, arthritis and death.  The patient states an understanding and wishes to proceed with surgery.   All questions were answered.  No guarantees were implied or stated.  Written informed consent was obtained.        Please do not hesitate to reach out to us via email, phone, or MyChart with any questions, concerns, or feedback.

## 2022-12-13 ENCOUNTER — LAB VISIT (OUTPATIENT)
Dept: LAB | Facility: HOSPITAL | Age: 82
End: 2022-12-13
Attending: INTERNAL MEDICINE
Payer: MEDICARE

## 2022-12-13 DIAGNOSIS — A31.0 MYCOBACTERIUM AVIUM-INTRACELLULARE COMPLEX: ICD-10-CM

## 2022-12-13 PROCEDURE — 87154 CUL TYP ID BLD PTHGN 6+ TRGT: CPT | Performed by: INTERNAL MEDICINE

## 2022-12-13 PROCEDURE — 87015 SPECIMEN INFECT AGNT CONCNTJ: CPT | Performed by: INTERNAL MEDICINE

## 2022-12-13 PROCEDURE — 87206 SMEAR FLUORESCENT/ACID STAI: CPT | Performed by: INTERNAL MEDICINE

## 2022-12-13 PROCEDURE — 87118 MYCOBACTERIC IDENTIFICATION: CPT | Performed by: INTERNAL MEDICINE

## 2022-12-13 PROCEDURE — 87116 MYCOBACTERIA CULTURE: CPT | Performed by: INTERNAL MEDICINE

## 2022-12-14 ENCOUNTER — CLINICAL SUPPORT (OUTPATIENT)
Dept: ENDOSCOPY | Facility: HOSPITAL | Age: 82
End: 2022-12-14
Attending: INTERNAL MEDICINE
Payer: MEDICARE

## 2022-12-14 DIAGNOSIS — D64.9 ANEMIA, UNSPECIFIED TYPE: ICD-10-CM

## 2022-12-14 NOTE — PLAN OF CARE
Contacted patient to schedule EGD and colonoscopy.  Wife informed me that he has surgery on 1/11/23 and wants to postpone until after.  Scheduled follow up PAT appointment.

## 2022-12-21 ENCOUNTER — OFFICE VISIT (OUTPATIENT)
Dept: INFECTIOUS DISEASES | Facility: CLINIC | Age: 82
End: 2022-12-21
Payer: MEDICARE

## 2022-12-21 ENCOUNTER — TELEPHONE (OUTPATIENT)
Dept: INFECTIOUS DISEASES | Facility: CLINIC | Age: 82
End: 2022-12-21
Payer: MEDICARE

## 2022-12-21 VITALS
OXYGEN SATURATION: 97 % | HEART RATE: 58 BPM | HEIGHT: 69 IN | BODY MASS INDEX: 23.46 KG/M2 | SYSTOLIC BLOOD PRESSURE: 136 MMHG | DIASTOLIC BLOOD PRESSURE: 81 MMHG | WEIGHT: 158.38 LBS

## 2022-12-21 DIAGNOSIS — Z79.899 HIGH RISK MEDICATIONS (NOT ANTICOAGULANTS) LONG-TERM USE: ICD-10-CM

## 2022-12-21 DIAGNOSIS — A31.0 MYCOBACTERIUM AVIUM-INTRACELLULARE COMPLEX: Primary | ICD-10-CM

## 2022-12-21 DIAGNOSIS — Z79.2 ANTIBIOTIC LONG-TERM USE: ICD-10-CM

## 2022-12-21 DIAGNOSIS — J98.4 CAVITARY LUNG DISEASE: ICD-10-CM

## 2022-12-21 PROCEDURE — 1157F PR ADVANCE CARE PLAN OR EQUIV PRESENT IN MEDICAL RECORD: ICD-10-PCS | Mod: CPTII,S$GLB,, | Performed by: STUDENT IN AN ORGANIZED HEALTH CARE EDUCATION/TRAINING PROGRAM

## 2022-12-21 PROCEDURE — 3075F SYST BP GE 130 - 139MM HG: CPT | Mod: CPTII,S$GLB,, | Performed by: STUDENT IN AN ORGANIZED HEALTH CARE EDUCATION/TRAINING PROGRAM

## 2022-12-21 PROCEDURE — 1101F PT FALLS ASSESS-DOCD LE1/YR: CPT | Mod: CPTII,S$GLB,, | Performed by: STUDENT IN AN ORGANIZED HEALTH CARE EDUCATION/TRAINING PROGRAM

## 2022-12-21 PROCEDURE — 99999 PR PBB SHADOW E&M-EST. PATIENT-LVL III: ICD-10-PCS | Mod: PBBFAC,,,

## 2022-12-21 PROCEDURE — 3075F PR MOST RECENT SYSTOLIC BLOOD PRESS GE 130-139MM HG: ICD-10-PCS | Mod: CPTII,S$GLB,, | Performed by: STUDENT IN AN ORGANIZED HEALTH CARE EDUCATION/TRAINING PROGRAM

## 2022-12-21 PROCEDURE — 99215 PR OFFICE/OUTPT VISIT, EST, LEVL V, 40-54 MIN: ICD-10-PCS | Mod: S$GLB,,, | Performed by: STUDENT IN AN ORGANIZED HEALTH CARE EDUCATION/TRAINING PROGRAM

## 2022-12-21 PROCEDURE — 1125F PR PAIN SEVERITY QUANTIFIED, PAIN PRESENT: ICD-10-PCS | Mod: CPTII,S$GLB,, | Performed by: STUDENT IN AN ORGANIZED HEALTH CARE EDUCATION/TRAINING PROGRAM

## 2022-12-21 PROCEDURE — 3288F FALL RISK ASSESSMENT DOCD: CPT | Mod: CPTII,S$GLB,, | Performed by: STUDENT IN AN ORGANIZED HEALTH CARE EDUCATION/TRAINING PROGRAM

## 2022-12-21 PROCEDURE — 1160F PR REVIEW ALL MEDS BY PRESCRIBER/CLIN PHARMACIST DOCUMENTED: ICD-10-PCS | Mod: CPTII,S$GLB,, | Performed by: STUDENT IN AN ORGANIZED HEALTH CARE EDUCATION/TRAINING PROGRAM

## 2022-12-21 PROCEDURE — 1101F PR PT FALLS ASSESS DOC 0-1 FALLS W/OUT INJ PAST YR: ICD-10-PCS | Mod: CPTII,S$GLB,, | Performed by: STUDENT IN AN ORGANIZED HEALTH CARE EDUCATION/TRAINING PROGRAM

## 2022-12-21 PROCEDURE — 1159F PR MEDICATION LIST DOCUMENTED IN MEDICAL RECORD: ICD-10-PCS | Mod: CPTII,S$GLB,, | Performed by: STUDENT IN AN ORGANIZED HEALTH CARE EDUCATION/TRAINING PROGRAM

## 2022-12-21 PROCEDURE — 1160F RVW MEDS BY RX/DR IN RCRD: CPT | Mod: CPTII,S$GLB,, | Performed by: STUDENT IN AN ORGANIZED HEALTH CARE EDUCATION/TRAINING PROGRAM

## 2022-12-21 PROCEDURE — 3079F DIAST BP 80-89 MM HG: CPT | Mod: CPTII,S$GLB,, | Performed by: STUDENT IN AN ORGANIZED HEALTH CARE EDUCATION/TRAINING PROGRAM

## 2022-12-21 PROCEDURE — 1159F MED LIST DOCD IN RCRD: CPT | Mod: CPTII,S$GLB,, | Performed by: STUDENT IN AN ORGANIZED HEALTH CARE EDUCATION/TRAINING PROGRAM

## 2022-12-21 PROCEDURE — 1157F ADVNC CARE PLAN IN RCRD: CPT | Mod: CPTII,S$GLB,, | Performed by: STUDENT IN AN ORGANIZED HEALTH CARE EDUCATION/TRAINING PROGRAM

## 2022-12-21 PROCEDURE — 3079F PR MOST RECENT DIASTOLIC BLOOD PRESSURE 80-89 MM HG: ICD-10-PCS | Mod: CPTII,S$GLB,, | Performed by: STUDENT IN AN ORGANIZED HEALTH CARE EDUCATION/TRAINING PROGRAM

## 2022-12-21 PROCEDURE — 99999 PR PBB SHADOW E&M-EST. PATIENT-LVL III: CPT | Mod: PBBFAC,,,

## 2022-12-21 PROCEDURE — 1125F AMNT PAIN NOTED PAIN PRSNT: CPT | Mod: CPTII,S$GLB,, | Performed by: STUDENT IN AN ORGANIZED HEALTH CARE EDUCATION/TRAINING PROGRAM

## 2022-12-21 PROCEDURE — 3288F PR FALLS RISK ASSESSMENT DOCUMENTED: ICD-10-PCS | Mod: CPTII,S$GLB,, | Performed by: STUDENT IN AN ORGANIZED HEALTH CARE EDUCATION/TRAINING PROGRAM

## 2022-12-21 PROCEDURE — 99215 OFFICE O/P EST HI 40 MIN: CPT | Mod: S$GLB,,, | Performed by: STUDENT IN AN ORGANIZED HEALTH CARE EDUCATION/TRAINING PROGRAM

## 2022-12-21 NOTE — PROGRESS NOTES
INFECTIOUS DISEASE CLINIC  12/21/2022     Subjective:      Chief Complaint:   Chief Complaint   Patient presents with    Follow-up       History of Present Illness:    This is a 82 y.o. male with prior smoking history, emphysematous/cavitary pulm MAC and antiphospholipid antibody syndrome on warfarin who is referred to my clinic for follow up.  Patient is new to me, pt accompanied by wife today.  Patient known to ID, please see prior notes for full details. Per chart review, pt was previously on NILDA therapy; however, stopped rif due to GI intolerance. He is currently on ethambutol and azithromycin, tolerating without issues. Also using aerobika/inhaled hypertonic saline. Denies further episodes of hemoptysis. Has an appt with optometry; however, will cancel due to weather tomorrow.       Review of Systems   Constitutional: Negative for chills and fever.   Eyes:  Negative for blurred vision, double vision and photophobia.   Respiratory:  Positive for cough, shortness of breath and sputum production (scant). Negative for hemoptysis.    Gastrointestinal:  Negative for abdominal pain, nausea and vomiting.   All other systems reviewed and are negative.      Past Medical History:   Diagnosis Date    Antiphospholipid syndrome 11/19/2021    Dysphagia     Hx of colonic polyps     followed by GI. Dr. Pham    Hyperlipidemia LDL goal <100     Overweight(278.02)     Prostate cancer september 2011    followed by urology, Dr. Rogers    Type II or unspecified type diabetes mellitus without mention of complication, not stated as uncontrolled     diet controlled     Past Surgical History:   Procedure Laterality Date    BRONCHOSCOPY N/A 10/15/2018    Procedure: BRONCHOSCOPY;  Surgeon: [facility] Diagnostic Provider;  Location: [facility] OR [address] [place] [address];  Service: Anesthesiology;  Laterality: N/A;    CATARACT EXTRACTION, BILATERAL  2014    POSTERIOR CERVICAL LAMINECTOMY Bilateral 4/28/2022    Procedure: LAMINECTOMY, SPINE, CERVICAL, POSTERIOR  APPROACH C3-6;  Surgeon: Carlos Miller MD;  Location: [facility] OR [address] [place] [address];  Service: Neurosurgery;  Laterality: Bilateral;    PROSTATECTOMY      REPAIR, HERNIA, INGUINAL, WITHOUT HISTORY OF PRIOR REPAIR, AGE 5 YEARS OR OLDER Right 10/10/2022    Procedure: REPAIR, HERNIA, INGUINAL, WITHOUT HISTORY OF PRIOR REPAIR, AGE 5 YEARS OR OLDER;  Surgeon: Dario Esquivel MD;  Location: [facility] OR [address] [place] [address];  Service: General;  Laterality: Right;  open right inguinal hernia repair with mesh     Family History   Problem Relation Age of Onset    Colon cancer Mother     Diabetes Mother     Heart disease Father     Mental illness Sister     Diabetes Sister     Other Brother         polio as a child    Pulmonary fibrosis Brother     Diabetes Brother     Myasthenia gravis Brother     Parkinsonism Brother     Diabetes Brother     Dementia Brother     Lung cancer Brother         smoker    Diabetes Maternal Aunt     Diabetes Other     Esophageal cancer Neg Hx      Social History     Tobacco Use    Smoking status: Former     Packs/day: 0.25     Years: 5.00     Pack years: 1.25     Types: Cigarettes     Quit date: 10/2/1962     Years since quittin.2    Smokeless tobacco: Former    Tobacco comments:     quit age 23   Substance Use Topics    Alcohol use: Not Currently    Drug use: No       Review of patient's allergies indicates:  No Known Allergies      Objective:   VS (24h):   Vitals:    22 1114   BP: 136/81   Pulse: (!) 58         Physical Exam  Constitutional:       General: He is not in acute distress.     Appearance: He is not ill-appearing or toxic-appearing.   HENT:      Head: Normocephalic and atraumatic.   Eyes:      General: No scleral icterus.        Right eye: No discharge.         Left eye: No discharge.   Cardiovascular:      Rate and Rhythm: Normal rate and regular rhythm.   Pulmonary:      Effort: Pulmonary effort is normal. No respiratory distress.      Breath sounds: No stridor. No wheezing or rhonchi.   Skin:      General: Skin is warm and dry.      Findings: No erythema or rash.   Neurological:      Mental Status: He is alert and oriented to person, place, and time. Mental status is at baseline.      Motor: No weakness.   Psychiatric:         Mood and Affect: Mood normal.         Behavior: Behavior normal.           Micro:   12/13/22 - AFB cx in process, smear neg  11/21/22 - AFB cx + MAC  10/21/22 AFB cx negative  9/30/22  AFB cx MAC     Organism     MYCOBACTERIUM AVIUM   Antibiotic                   TED (mcg/mL)  Interpretation   ----------------------------------------------------------   Clofazimine                          0.12   Moxifloxacin                            4       R   Clarithromycin                          2       S   Amikacin (IV)                           8       S   Amikacin (liposomal, inhaled)           8       S   Linezolid                              32       R     8/8/22 AFB cx MAC    Organism     MYCOBACTERIUM AVIUM   Antibiotic                   TED (mcg/mL)  Interpretation   ----------------------------------------------------------   Clofazimine                          0.06   Moxifloxacin                           >4       R   Clarithromycin                          2       S   Amikacin (IV)                           8       S   Amikacin (liposomal, inhaled)           8       S   Linezolid                              16       I   Moxifloxacin: The in vivo effectiveness of this agent for   MAC disease is unproven.   Clarithromycin: Clarithromycin is the class drug for   macrolides and the only macrolide that needs to be tested.   Linezolid: The in vivo effectiveness of this agent for MAC   disease is unproven.     Immunization History   Administered Date(s) Administered    COVID-19, MRNA, LN-S, PF (Pfizer) (Purple Cap) 01/13/2021, 04/28/2021, 11/05/2021    Influenza 10/19/2022    Influenza (FLUAD) - Trivalent - Adjuvanted - PF (65+) 09/25/2019    Influenza - High Dose - PF (65 years and  older) 09/18/2015, 10/04/2016, 10/23/2017, 09/21/2018    Influenza - Quadrivalent - High Dose - PF (65 years and older) 11/05/2021, 10/18/2022    Influenza - Quadrivalent - PF *Preferred* (6 months and older) 10/06/2014    Influenza A (H1N1) 2009 Monovalent - IM 01/22/2010    Influenza Split 10/10/2011, 10/06/2014    Pneumococcal Conjugate - 13 Valent 10/28/2015    Pneumococcal Polysaccharide - 23 Valent 07/12/2013    Tdap 03/08/2015    Zoster Recombinant 09/25/2019, 12/30/2019         Assessment:     1. Mycobacterium avium-intracellulare complex    2. Cavitary lung disease    3. High risk medications (not anticoagulants) long-term use    4. Antibiotic long-term use     82 y.o. male with prior smoking history, emphysematous/cavitary pulm MAC (prior amik TED 64) and antiphospholipid antibody syndrome on warfarin who is referred to my clinic for follow up.  Tolerating azithro and ethambutal without issues. Using aerobika and inhaled hypertonic saline. Duration of therapy pending clearance; however anticipate prolonged course >1yr.      Plan:     -continue azith/ethambutol   -discussed potential adverse reactions with patient and wife, voiced understanding  -advised pt to reschedule optometry exam while on EMB  -called refills of azithro/emb to pharmacy   -continue inhaled hypertonic saline/aerobika  -monthly sputum AFBs - cups provided      Follow up in 3 months    Management of MAC was discussed with patient. Patient was given ample time for questions, all questions answered. Strict return precautions given to patient.       46 minutes of total time spent on the encounter, which includes face to face time and non-face to face time preparing to see the patient (eg, review of tests), Obtaining and/or reviewing separately obtained history, documenting clinical information in the electronic or other health record, independently interpreting results (not separately reported) and communicating results to the  patient/family/caregiver, or care coordination (not separately reported).           Marilee Moscoso MD  Infectious Disease

## 2022-12-21 NOTE — TELEPHONE ENCOUNTER
12/21/22    Spoke to Isela /simi,     Pt sputum Positive for afb    ----- Message from T[place]ontana Chevalier sent at 12/21/2022  1:11 PM CST -----  Regarding: critical value   Isela hankins from [facility] [employer] [facility] with critical value. Pls call Isela @ [phone].

## 2022-12-22 ENCOUNTER — PATIENT MESSAGE (OUTPATIENT)
Dept: INFECTIOUS DISEASES | Facility: CLINIC | Age: 82
End: 2022-12-22
Payer: MEDICARE

## 2023-01-03 ENCOUNTER — ANESTHESIA EVENT (OUTPATIENT)
Dept: SURGERY | Facility: HOSPITAL | Age: 83
End: 2023-01-03
Payer: MEDICARE

## 2023-01-03 NOTE — ANESTHESIA PAT ROS NOTE
Chart review complete. Patient's medical history reviewed.  OK to proceed at [facility].    Brooklyn Ye MD   1/3/2023

## 2023-01-05 ENCOUNTER — OFFICE VISIT (OUTPATIENT)
Dept: INTERNAL MEDICINE | Facility: CLINIC | Age: 83
End: 2023-01-05
Payer: MEDICARE

## 2023-01-05 ENCOUNTER — OFFICE VISIT (OUTPATIENT)
Dept: UROLOGY | Facility: CLINIC | Age: 83
End: 2023-01-05
Payer: MEDICARE

## 2023-01-05 VITALS
SYSTOLIC BLOOD PRESSURE: 126 MMHG | WEIGHT: 160 LBS | HEIGHT: 69 IN | TEMPERATURE: 97 F | DIASTOLIC BLOOD PRESSURE: 61 MMHG | HEART RATE: 65 BPM | OXYGEN SATURATION: 97 % | BODY MASS INDEX: 23.7 KG/M2

## 2023-01-05 VITALS
SYSTOLIC BLOOD PRESSURE: 128 MMHG | HEART RATE: 50 BPM | WEIGHT: 159.5 LBS | DIASTOLIC BLOOD PRESSURE: 74 MMHG | HEIGHT: 69 IN | BODY MASS INDEX: 23.62 KG/M2

## 2023-01-05 DIAGNOSIS — K40.90 REDUCIBLE RIGHT INGUINAL HERNIA: ICD-10-CM

## 2023-01-05 DIAGNOSIS — Z90.79 HISTORY OF RADICAL PROSTATECTOMY: ICD-10-CM

## 2023-01-05 DIAGNOSIS — A31.0 MAI (MYCOBACTERIUM AVIUM-INTRACELLULARE): ICD-10-CM

## 2023-01-05 DIAGNOSIS — E78.5 HYPERLIPIDEMIA, UNSPECIFIED HYPERLIPIDEMIA TYPE: ICD-10-CM

## 2023-01-05 DIAGNOSIS — G81.91 RIGHT HEMIPARESIS: ICD-10-CM

## 2023-01-05 DIAGNOSIS — R00.1 BRADYCARDIA: ICD-10-CM

## 2023-01-05 DIAGNOSIS — C61 PROSTATE CANCER: ICD-10-CM

## 2023-01-05 DIAGNOSIS — Z86.711 HISTORY OF PULMONARY EMBOLISM: ICD-10-CM

## 2023-01-05 DIAGNOSIS — R33.9 URINARY RETENTION: Primary | ICD-10-CM

## 2023-01-05 DIAGNOSIS — I63.512 ACUTE ISCHEMIC LEFT MCA STROKE: ICD-10-CM

## 2023-01-05 DIAGNOSIS — R53.1 RIGHT SIDED WEAKNESS: ICD-10-CM

## 2023-01-05 DIAGNOSIS — R33.9 INCOMPLETE EMPTYING OF BLADDER: ICD-10-CM

## 2023-01-05 DIAGNOSIS — H91.90 HEARING LOSS, UNSPECIFIED HEARING LOSS TYPE, UNSPECIFIED LATERALITY: ICD-10-CM

## 2023-01-05 DIAGNOSIS — D64.9 ANEMIA, UNSPECIFIED TYPE: ICD-10-CM

## 2023-01-05 DIAGNOSIS — D68.61 ANTIPHOSPHOLIPID SYNDROME: ICD-10-CM

## 2023-01-05 DIAGNOSIS — Z98.1 STATUS POST LAMINECTOMY WITH SPINAL FUSION: ICD-10-CM

## 2023-01-05 DIAGNOSIS — J98.4 CAVITARY LUNG DISEASE: ICD-10-CM

## 2023-01-05 DIAGNOSIS — R73.03 PREDIABETES: ICD-10-CM

## 2023-01-05 DIAGNOSIS — Z85.46 HISTORY OF PROSTATE CANCER: ICD-10-CM

## 2023-01-05 DIAGNOSIS — I27.20 MILD PULMONARY HYPERTENSION: ICD-10-CM

## 2023-01-05 PROBLEM — R14.2 FUNCTIONAL BELCHING DISORDER: Status: RESOLVED | Noted: 2021-12-07 | Resolved: 2023-01-05

## 2023-01-05 PROBLEM — E87.0 HYPERNATREMIA: Status: RESOLVED | Noted: 2022-05-05 | Resolved: 2023-01-05

## 2023-01-05 PROBLEM — R41.0 INTERMITTENT CONFUSION: Status: RESOLVED | Noted: 2022-04-29 | Resolved: 2023-01-05

## 2023-01-05 LAB
BILIRUB SERPL-MCNC: NORMAL MG/DL
BLOOD URINE, POC: NORMAL
CLARITY, POC UA: CLEAR
COLOR, POC UA: YELLOW
GLUCOSE UR QL STRIP: NORMAL
KETONES UR QL STRIP: NORMAL
LEUKOCYTE ESTERASE URINE, POC: NORMAL
NITRITE, POC UA: NORMAL
PH, POC UA: 6
POC RESIDUAL URINE VOLUME: 177 ML (ref 0–100)
PROTEIN, POC: NORMAL
SPECIFIC GRAVITY, POC UA: 1.01
UROBILINOGEN, POC UA: NORMAL

## 2023-01-05 PROCEDURE — 99999 PR PBB SHADOW E&M-EST. PATIENT-LVL III: CPT | Mod: PBBFAC,,, | Performed by: NURSE PRACTITIONER

## 2023-01-05 PROCEDURE — 3074F SYST BP LT 130 MM HG: CPT | Mod: CPTII,S$GLB,, | Performed by: NURSE PRACTITIONER

## 2023-01-05 PROCEDURE — 3078F PR MOST RECENT DIASTOLIC BLOOD PRESSURE < 80 MM HG: ICD-10-PCS | Mod: CPTII,S$GLB,, | Performed by: NURSE PRACTITIONER

## 2023-01-05 PROCEDURE — 1101F PR PT FALLS ASSESS DOC 0-1 FALLS W/OUT INJ PAST YR: ICD-10-PCS | Mod: CPTII,S$GLB,, | Performed by: NURSE PRACTITIONER

## 2023-01-05 PROCEDURE — 3074F PR MOST RECENT SYSTOLIC BLOOD PRESSURE < 130 MM HG: ICD-10-PCS | Mod: CPTII,S$GLB,, | Performed by: NURSE PRACTITIONER

## 2023-01-05 PROCEDURE — 99499 RISK ADDL DX/OHS AUDIT: ICD-10-PCS | Mod: S$GLB,,, | Performed by: NURSE PRACTITIONER

## 2023-01-05 PROCEDURE — 99499 UNLISTED E&M SERVICE: CPT | Mod: S$GLB,,, | Performed by: NURSE PRACTITIONER

## 2023-01-05 PROCEDURE — 1159F PR MEDICATION LIST DOCUMENTED IN MEDICAL RECORD: ICD-10-PCS | Mod: CPTII,S$GLB,, | Performed by: NURSE PRACTITIONER

## 2023-01-05 PROCEDURE — 99999 PR PBB SHADOW E&M-EST. PATIENT-LVL IV: ICD-10-PCS | Mod: PBBFAC,,, | Performed by: NURSE PRACTITIONER

## 2023-01-05 PROCEDURE — 51798 US URINE CAPACITY MEASURE: CPT | Mod: S$GLB,,, | Performed by: NURSE PRACTITIONER

## 2023-01-05 PROCEDURE — 1157F ADVNC CARE PLAN IN RCRD: CPT | Mod: CPTII,S$GLB,, | Performed by: NURSE PRACTITIONER

## 2023-01-05 PROCEDURE — 3288F FALL RISK ASSESSMENT DOCD: CPT | Mod: CPTII,S$GLB,, | Performed by: NURSE PRACTITIONER

## 2023-01-05 PROCEDURE — 1159F MED LIST DOCD IN RCRD: CPT | Mod: CPTII,S$GLB,, | Performed by: NURSE PRACTITIONER

## 2023-01-05 PROCEDURE — 1160F RVW MEDS BY RX/DR IN RCRD: CPT | Mod: CPTII,S$GLB,, | Performed by: NURSE PRACTITIONER

## 2023-01-05 PROCEDURE — 1101F PT FALLS ASSESS-DOCD LE1/YR: CPT | Mod: CPTII,S$GLB,, | Performed by: NURSE PRACTITIONER

## 2023-01-05 PROCEDURE — 1160F PR REVIEW ALL MEDS BY PRESCRIBER/CLIN PHARMACIST DOCUMENTED: ICD-10-PCS | Mod: CPTII,S$GLB,, | Performed by: NURSE PRACTITIONER

## 2023-01-05 PROCEDURE — 99214 OFFICE O/P EST MOD 30 MIN: CPT | Mod: S$GLB,,, | Performed by: NURSE PRACTITIONER

## 2023-01-05 PROCEDURE — 1157F PR ADVANCE CARE PLAN OR EQUIV PRESENT IN MEDICAL RECORD: ICD-10-PCS | Mod: CPTII,S$GLB,, | Performed by: NURSE PRACTITIONER

## 2023-01-05 PROCEDURE — 3078F DIAST BP <80 MM HG: CPT | Mod: CPTII,S$GLB,, | Performed by: NURSE PRACTITIONER

## 2023-01-05 PROCEDURE — 51798 POCT BLADDER SCAN: ICD-10-PCS | Mod: S$GLB,,, | Performed by: NURSE PRACTITIONER

## 2023-01-05 PROCEDURE — 99214 PR OFFICE/OUTPT VISIT, EST, LEVL IV, 30-39 MIN: ICD-10-PCS | Mod: S$GLB,,, | Performed by: NURSE PRACTITIONER

## 2023-01-05 PROCEDURE — 3288F PR FALLS RISK ASSESSMENT DOCUMENTED: ICD-10-PCS | Mod: CPTII,S$GLB,, | Performed by: NURSE PRACTITIONER

## 2023-01-05 PROCEDURE — 99999 PR PBB SHADOW E&M-EST. PATIENT-LVL IV: CPT | Mod: PBBFAC,,, | Performed by: NURSE PRACTITIONER

## 2023-01-05 PROCEDURE — 81002 POCT URINE DIPSTICK WITHOUT MICROSCOPE: ICD-10-PCS | Mod: S$GLB,,, | Performed by: NURSE PRACTITIONER

## 2023-01-05 PROCEDURE — 1126F PR PAIN SEVERITY QUANTIFIED, NO PAIN PRESENT: ICD-10-PCS | Mod: CPTII,S$GLB,, | Performed by: NURSE PRACTITIONER

## 2023-01-05 PROCEDURE — 1126F AMNT PAIN NOTED NONE PRSNT: CPT | Mod: CPTII,S$GLB,, | Performed by: NURSE PRACTITIONER

## 2023-01-05 PROCEDURE — 99999 PR PBB SHADOW E&M-EST. PATIENT-LVL III: ICD-10-PCS | Mod: PBBFAC,,, | Performed by: NURSE PRACTITIONER

## 2023-01-05 PROCEDURE — 81002 URINALYSIS NONAUTO W/O SCOPE: CPT | Mod: S$GLB,,, | Performed by: NURSE PRACTITIONER

## 2023-01-05 NOTE — ASSESSMENT & PLAN NOTE
Had Right Hemiparesis that has improved to a slight weakness. PAtient uses walker for stability and safety

## 2023-01-05 NOTE — ASSESSMENT & PLAN NOTE
History of blood clot in his lungs  Left sided PE noted on CTA 1/30/21  Treated with Coumadin for 6 months  Last seen by Dr. Pantoja 7/22/22 and patient was advised to stop anticoagulation  PAtient takes aspirin 81 mg daily

## 2023-01-05 NOTE — ASSESSMENT & PLAN NOTE
H/O MAC infection  Followed by ARTHUR Avery to proceed with surgery  Reports no SOB, cough, sputum, Wheezing, chest tightness, tiredness,  weight loss, appetitive changes at this visit

## 2023-01-05 NOTE — PROGRESS NOTES
[facility] [address]  Progress Note    Patient Name: Diego Gunter  MRN: [id number]  Date of Evaluation- 01/09/2023  PCP- Portia Ferreira MD    Future cases for Diego Gunter [[id number]]       Case ID Status Date Time Talat Procedure Provider Location    [id number] [facility] 1/11/2023  8:38  DECOMPRESSION, NERVE, ULNAR - right cub jolie and guyon's decompression as well as open right carpal tunnel release Romero Lazo MD [[id number]] [facility] OR            HPI:  This is a 82 y.o. male  who presents today for a preoperative evaluation in preparation for a Orthopedic  procedure.  Scheduled for  11/11/23  Surgery is indicated for right wrist surgery- nerve decompression.   Patient is new to me.  Details of current problem: The duration of problem is stated 4/28/22 after cervical spine surgery  Reports symptoms of  unable to pick things up, decreased  strength, numbness  Aggravating factors include:   attempting to pick things up, bumping his hand  Relieving factors are  none    Treated with  upcoming surgery   Reports pain: 0/10  The history has been obtained by speaking with the patient and reviewing the electronic medical record and/or outside health information. Significant health conditions for the perioperative period are discussed below in assessment and plan.     Patient reports current health status to be Good.  Denies any new symptoms before surgery.       Subjective/ Objective:     Chief Complaint: Preoperative evaulation, perioperative medical management, and complication reduction plan.     Functional Capacity:  Able to climb a flight of stairs without CP SOB or Syncope.  Able to meet 4 METs       Anesthesia issues: None    Difficulty mouth opening: none    Steroid use in the last 12 months: none     Dental Issues: none    Family anesthesia difficulty: None known     Family Hx of Thrombosis none known    Past Medical History:   Diagnosis Date    Acute encephalopathy     Acute ischemic left MCA stroke      Acute ischemic left MCA stroke 4/28/2022    Anemia     Antiphospholipid syndrome 11/19/2021    Centrilobular emphysema     COPD (chronic obstructive pulmonary disease)     Dysphagia     Dysphagia, oropharyngeal 6/17/2016    - improved with speech therapy    Functional belching disorder 12/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 Dr. Pham    Hx of colonic polyps     followed by GI. Dr. Pham    Hyperlipidemia associated with type 2 diabetes mellitus     Hyperlipidemia LDL goal <100     Hypernatremia 5/5/2022    Hypertension associated with type 2 diabetes mellitus     Hypertension associated with type 2 diabetes mellitus     Hypotension     Intermittent confusion 4/29/2022    Overweight(278.02)     Prostate cancer 09/2011    followed by urology, Dr. Rogers    Pulmonary embolism     Reducible right inguinal hernia 10/10/2022    Right hemiparesis 7/22/2022    Secondary to cervical hematoma    Type II or unspecified type diabetes mellitus without mention of complication, not stated as uncontrolled     diet controlled    Urinary retention 4/29/2022     Past Surgical History:   Procedure Laterality Date    BRONCHOSCOPY N/A 10/15/2018    Procedure: BRONCHOSCOPY;  Surgeon: [facility] Diagnostic Provider;  Location: [facility] OR [address] [place] [address];  Service: Anesthesiology;  Laterality: N/A;    CATARACT EXTRACTION, BILATERAL  2014    POSTERIOR CERVICAL LAMINECTOMY Bilateral 4/28/2022    Procedure: LAMINECTOMY, SPINE, CERVICAL, POSTERIOR APPROACH C3-6;  Surgeon: Carlos Miller MD;  Location: [facility] OR [address] [place] [address];  Service: Neurosurgery;  Laterality: Bilateral;    PROSTATECTOMY      REPAIR, HERNIA, INGUINAL, WITHOUT HISTORY OF PRIOR REPAIR, AGE 5 YEARS OR OLDER Right 10/10/2022    Procedure: REPAIR, HERNIA, INGUINAL, WITHOUT HISTORY OF PRIOR REPAIR, AGE 5 YEARS OR OLDER;  Surgeon: Dario Esquivel MD;  Location: [address];  Service: General;  Laterality: Right;  open right inguinal hernia repair with  "mesh       Review of Systems   Constitutional:  Positive for fatigue. Negative for activity change, appetite change, chills, diaphoresis, fever and unexpected weight change.   HENT:  Negative for congestion, dental problem, drooling, trouble swallowing and voice change.    Eyes:  Negative for photophobia and visual disturbance.        No acute visual changes   Respiratory:  Negative for apnea, cough, choking, chest tightness, shortness of breath, wheezing and stridor.         STOP bang  Score 2  Low risk PERRY     Cardiovascular:  Negative for chest pain, palpitations and leg swelling.   Gastrointestinal:  Positive for constipation. Negative for abdominal pain, blood in stool, diarrhea, nausea and vomiting.        No FLD, Hepatitis, Cirrhosis  No BRB or black tarry stool   Endocrine: Positive for cold intolerance.   Genitourinary:  Negative for difficulty urinating, dysuria, frequency, hematuria and urgency.        Nocturia 1   Musculoskeletal:  Positive for arthralgias. Negative for gait problem, myalgias, neck pain and neck stiffness.        Shoulder pain   Neurological:  Positive for numbness. Negative for dizziness, seizures, syncope, light-headedness and headaches.   Psychiatric/Behavioral:  Negative for suicidal ideas. The patient is not nervous/anxious.             VITALS  Visit Vitals  /61 (BP Location: Right arm, Patient Position: Sitting)   Pulse 65   Temp 97.4 °F (36.3 °C) (Oral)   Ht 5' 9" (1.753 m)   Wt 72.6 kg (160 lb)   SpO2 97%   BMI 23.63 kg/m²          Physical Exam  Constitutional:       General: He is not in acute distress.     Appearance: He is well-developed. He is not diaphoretic.   HENT:      Head: Normocephalic.      Right Ear: Hearing normal.      Left Ear: Hearing normal.      Nose: Nose normal.   Eyes:      General: Lids are normal.      Conjunctiva/sclera: Conjunctivae normal.      Pupils: Pupils are equal, round, and reactive to light.   Neck:      Trachea: Trachea and phonation " normal.   Cardiovascular:      Rate and Rhythm: Normal rate and regular rhythm.      Pulses:           Carotid pulses are 2+ on the right side and 2+ on the left side.       Radial pulses are 2+ on the right side and 2+ on the left side.        Posterior tibial pulses are 2+ on the right side and 2+ on the left side.      Heart sounds: Normal heart sounds. No murmur heard.    No friction rub. No gallop.   Pulmonary:      Effort: Pulmonary effort is normal.      Breath sounds: Normal breath sounds.   Abdominal:      General: Bowel sounds are normal. There is no distension.      Palpations: Abdomen is soft.      Tenderness: There is no abdominal tenderness.   Musculoskeletal:         General: No tenderness or deformity. Normal range of motion.      Cervical back: Normal range of motion.      Right lower leg: No edema.      Left lower leg: No edema.   Lymphadenopathy:      Head:      Right side of head: No submental, submandibular, tonsillar, preauricular, posterior auricular or occipital adenopathy.      Left side of head: No submental, submandibular, tonsillar, preauricular, posterior auricular or occipital adenopathy.      Cervical:      Right cervical: No superficial cervical adenopathy.     Left cervical: No superficial cervical adenopathy.   Skin:     General: Skin is warm and dry.      Capillary Refill: Capillary refill takes 2 to 3 seconds.      Coloration: Skin is not pale.      Findings: No erythema or rash.   Neurological:      Mental Status: He is alert and oriented to person, place, and time.      GCS: GCS eye subscore is 4. GCS verbal subscore is 5. GCS motor subscore is 6.      Motor: No abnormal muscle tone.      Coordination: Coordination normal.   Psychiatric:         Attention and Perception: Attention and perception normal.         Mood and Affect: Mood and affect normal.         Speech: Speech normal.         Behavior: Behavior normal. Behavior is cooperative.         Thought Content: Thought  content normal.         Cognition and Memory: Cognition and memory normal.         Judgment: Judgment normal.        Significant Labs:  Lab Results   Component Value Date    WBC 6.00 11/04/2022    HGB 10.1 (L) 11/04/2022    HCT 31.2 (L) 11/04/2022     11/04/2022    CHOL 116 (L) 04/28/2022    TRIG 88 04/28/2022    HDL 36 (L) 04/28/2022    ALT 14 10/21/2022    AST 21 10/21/2022     10/21/2022    K 3.9 10/21/2022     10/21/2022    CREATININE 1.1 10/21/2022    BUN 11 10/21/2022    CO2 24 10/21/2022    TSH 3.649 04/28/2022    PSA 0.02 10/28/2015    INR 1.1 09/19/2022    HGBA1C 5.7 (H) 04/28/2022       Diagnostic Studies: No relevant studies.    EKG:   Results for orders placed or performed during the hospital encounter of 09/18/22   EKG 12-lead    Collection Time: 09/19/22 12:53 AM    Narrative    Test Reason : R10.9,    Vent. Rate : 056 BPM     Atrial Rate : 056 BPM     P-R Int : 186 ms          QRS Dur : 088 ms      QT Int : 436 ms       P-R-T Axes : 050 052 055 degrees     QTc Int : 420 ms      Baseline Artifact Sinus bradycardia  Otherwise normal ECG  When compared with ECG of 13-MAY-2022 00:48,  No significant change was found  Confirmed by LOIS TEAGUE MD [id number]) on 9/19/2022 4:24:40 PM    Referred By: [email or web address]   SELF           Confirmed By:LOIS TEAGUE MD       2D ECHO:  TTE:  Results for orders placed or performed during the hospital encounter of 04/28/22   Echo   Result Value Ref Range    Ascending aorta 3.72 cm    STJ 3.47 cm    AV mean gradient 4 mmHg    Ao peak jenna 1.37 m/s    Ao VTI 27.67 cm    IVS 0.90 0.6 - 1.1 cm    LA size 3.24 cm    Left Atrium Major Axis 5.25 cm    Left Atrium Minor Axis 5.37 cm    LVIDd 4.70 3.5 - 6.0 cm    LVIDs 2.97 2.1 - 4.0 cm    LVOT diameter 2.02 cm    LVOT peak VTI 24.56 cm    Posterior Wall 0.89 0.6 - 1.1 cm    MV Peak A Jenna 1.01 m/s    E wave deceleration time 249.44 msec    MV Peak E Jenna 0.93 m/s    RA Major Axis 4.64 cm    RA Width 3.05 cm    RVDD  3.32 cm    Sinus 3.00 cm    TAPSE 2.72 cm    TDI LATERAL 0.11 m/s    TDI SEPTAL 0.09 m/s    LA WIDTH 3.55 cm    MV stenosis pressure 1/2 time 72.34 ms    LV Diastolic Volume 58.17 mL    LV Systolic Volume 34.08 mL    LVOT peak jenna 0.85 m/s    LV LATERAL E/E' RATIO 8.45 m/s    LV SEPTAL E/E' RATIO 10.33 m/s    FS 37 %    LA volume 51.91 cm3    LV mass 141.65 g    Left Ventricle Relative Wall Thickness 0.38 cm    AV valve area 2.84 cm2    AV Velocity Ratio 0.62     AV index (prosthetic) 0.89     MV valve area p 1/2 method 3.04 cm2    E/A ratio 0.92     Mean e' 0.10 m/s    LVOT area 3.2 cm2    LVOT stroke volume 78.67 cm3    AV peak gradient 8 mmHg    E/E' ratio 9.30 m/s    LV Systolic Volume Index 17.9 mL/m2    LV Diastolic Volume Index 30.62 mL/m2    LA Volume Index 27.3 mL/m2    LV Mass Index 75 g/m2    BSA 1.91 m2    Right Atrial Pressure (from IVC) 3 mmHg    EF 65 %    Narrative    · The estimated ejection fraction is 65%.  · The left ventricle is normal in size with normal systolic function.  · Normal left ventricular diastolic function.  · Normal right ventricular size with normal right ventricular systolic   function.  · Normal central venous pressure (3 mmHg).  · The ascending aorta is upper level of normal.          ANNETTE:  No results found for this or any previous visit.     Imaging     Active Cardiac Conditions: None      Revised Cardiac Risk Index   High -Risk Surgery  Intraperitoneal; Intrathoracic; suprainguinal vascular Yes- + 1 No- 0   History of Ischemic Heart Disease   (Hx of MI/positive exercise test/current chest pain due to ischemia/use of nitrate therapy/EKG with pathological Q waves) Yes- + 1 No- 0   History of CHF  (Pulmonary edema/bilateral rales or S3 gallop/PND/CXR showing pulmonary vascular redistribution) Yes- + 1 No- 0   History of CVA   (Prior stroke or TIA) Yes- + 1 No- 0   Pre-operative treatment with insulin Yes- + 1 No- 0   Pre-operative creatinine > 2mg/dl Yes- + 1 No- 0  "  Total:      Risk Status:  Estimated risk of cardiac complications after non-cardiac surgery using the Revised Cardiac Risk Index for Preoperative risk is 3.9 %      ARISCAT (Canet) risk index: Low: 1.6% risk of post-op pulmonary complications.    American Society of Anesthesiologists Physical Status classification (ASA): 3           No further cardiac workup needed prior to surgery.        Preoperative cardiac risk assessment-  The patient does not have any active cardiac conditions . Revised cardiac risk index predictors- ---.Functional capacity is more than 4 Mets. He will be undergoing a Orthopedic procedure that carries a Moderate Risk risk     The estimated risk of the rate of adverse cardiac outcomes  3.9-6  No further cardiac work up is indicated prior to proceeding with the surgery          American Society of Anesthesiologists Physical status classification ( ASA ) class: 3     Postoperative pulmonary complication risk assessment: 1.6     ARISCAT ( Canet) risk index- risk class -  Low, if duration of surgery is under 3 hours, intermediate, if duration of surgery is over 3 hours        Assessment/Plan:     HILLARY (mycobacterium avium-intracellulare)  CAre with ID [facility] "Paola Spicer"  On antibiotics azithro/emb until infection cleared    Last notes  Sputum still showing positive culture  continue azith/ethambutol              -discussed potential adverse reactions with patient and wife, voiced understanding  -advised pt to reschedule optometry exam while on EMB  -called refills of azithro/emb to pharmacy   -continue inhaled hypertonic saline/aerobika  -monthly sputum AFBs - cups provided           Right hemiparesis  Some impairment but no longer has  hemiparesis  He has a history hemiparesis that occurred with epidural hematome     History of pulmonary embolism  History of blood clot in his lungs  Left sided PE noted on CTA 1/30/21  Treated with Coumadin for 6 months  Last seen by Dr. Pantoja 7/22/22 and " patient was advised to stop anticoagulation  PAtient takes aspirin 81 mg daily      Prediabetes  A1c 5.7    Cholelithiasis  No complaints of abdominal pain at this visit    Reducible right inguinal hernia  Surgically repaired    Anemia  Hgb 10.1   HCT 32.1    Hyperlipidemia  Pravastatin    Bradycardia  EKG    Vent. Rate : 056 BPM     Atrial Rate : 056 BPM      P-R Int : 186 ms          QRS Dur : 088 ms       QT Int : 436 ms       P-R-T Axes : 050 052 055 degrees      QTc Int : 420 ms       Baseline Artifact Sinus bradycardia   Otherwise normal ECG   When compared with ECG of 13-MAY-2022 00:48,   No significant change was found   Confirmed by LOIS TEAGUE MD ([id number]) on 9/19/2022 4:24:40 PM           Specimen Collected: 09/19/22 00:53            Acute ischemic left MCA stroke  Acute ischemic left MCA stroke  Neg on CTA  Given ASA at OSH before epidural hematoma found as cause of acute hemiplegia      Status post laminectomy with spinal fusion  Surgery occurred 4/28/22- emergent posterior C3 through C6 laminectomy with posterolateral fusion for large epidural hematoma with quadriparesis.  This was done on 04/28/2022    Particiapated in Rehab and has regained ability to walk; Had urinary retention and is following up with Urology 1/5/23.  Last F/U visit was with Dr. Miller 7/22/22    Hard of hearing  Has hearing aids but still has difficulty hearing when they are in use    Mild pulmonary hypertension  Last recorded pulmonary pressure was 30    Most recent Echo  Summary    The estimated ejection fraction is 65%.  The left ventricle is normal in size with normal systolic function.  Normal left ventricular diastolic function.  Normal right ventricular size with normal right ventricular systolic function.  Normal central venous pressure (3 mmHg).  The ascending aorta is upper level of normal.         Antiphospholipid syndrome  Last seen per Dr. Pantoja 7/22/22  He was advised to stop anticoagulation at this visit  PAtient takes  aspirin 81 mg daily        Right sided weakness  Had Right Hemiparesis that has improved to a slight weakness. PAtient uses walker for stability and safety    History of prostate cancer  Prostatectomy 10/2011    Cavitary lung disease  H/O MAC infection  Followed by ARTHUR Avery to proceed with surgery  Reports no SOB, cough, sputum, Wheezing, chest tightness, tiredness,  weight loss, appetitive changes at this visit    Preventive perioperative care    Thromboembolic prophylaxis:  His risk factors for thrombosis include surgical procedure, age, and reduced mobility.I suggest  thromboembolic prophylaxis ( mechanical/pharmacological, weighing the risk benefits of pharmacological agent use considering cristiano procedural bleeding )  during the perioperative period.I suggested being active in the post operative period.      Postoperative pulmonary complication prophylaxis-Risk factors for post operative pulmonary complications include age over 65 years and ASA class >2- I suggest incentive spirometry use, early ambulation, and pain control so as to avoid diaphragmatic splinting  Brush teeth twice per day, oral rinses, sleep with the head of the bed up 30 degrees     Renal complication prophylaxis-Risk factors for renal complications include age . I suggest keeping him well hydrated and avoidance/ minimizing the use of  NSAID's,BRANTLEY 2 Inhibitors ,IV contrast if possible in the perioperative period.I suggested drinking 2 litre's of water a day      Surgical site Infection Prophylaxis-I  suggest appropriate antibiotic for Prophylaxis against Surgical site infections Shower with Hibiclens in the night before surgery and the morning of surgery     This visit was focused on Preoperative evaluation, Perioperative Medical management, complication reduction plans. I suggest that the patient follows up with primary care or relevant sub specialists for ongoing health care.    I appreciate the opportunity to be involved in this patients  care. Please feel free to contact me if there were any questions about this consultation.    Patient is optimized    D/W ID recs/ ok to proceed with surgery  Haseeb Hawkins NP  Perioperative Medicine  [facility]   Pager [phone]

## 2023-01-05 NOTE — ASSESSMENT & PLAN NOTE
Last recorded pulmonary pressure was 30    Most recent Echo  Summary     The estimated ejection fraction is 65%.   The left ventricle is normal in size with normal systolic function.   Normal left ventricular diastolic function.   Normal right ventricular size with normal right ventricular systolic function.   Normal central venous pressure (3 mmHg).   The ascending aorta is upper level of normal.

## 2023-01-05 NOTE — PROGRESS NOTES
CHIEF COMPLAINT:    Diego Gunter is a 82 y.o. male presents today for Urinary Retention.    HISTORY OF PRESENTING ILLINESS:    Diego Gunter is a 82 y.o. male with a history of Urinary Retention following spinal surgery 04/28/2022.   Last successful VT was 08/05/2022; see HPI.    Last clinic visit was 08/26/2022    Here today for f/u visit.  Voices no complaints.   Pleased with urination.  Controlling his constipation with OTC meds        REVIEW OF SYSTEMS:  Review of Systems   Constitutional: Negative.  Negative for chills and fever.   Eyes:  Negative for double vision.   Respiratory:  Negative for cough and shortness of breath.    Cardiovascular:  Negative for chest pain.   Gastrointestinal:  Negative for abdominal pain, constipation, diarrhea, nausea and vomiting.        Taking Miralax PRN     Genitourinary:  Positive for frequency. Negative for dysuria, flank pain and hematuria.        Pleased with how he urinates.     Musculoskeletal:  Positive for joint pain.        Uses walker for balance;     Neurological:  Positive for focal weakness. Negative for dizziness and seizures.        Right arm; right wrist surgery- nerve decompression  History CVA     Endo/Heme/Allergies:  Negative for polydipsia.       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 Dr. Pham    Hx of colonic polyps     followed by GI. Dr. Pham    Hyperlipidemia associated with type 2 diabetes mellitus     Hyperlipidemia LDL goal <100     Hypernatremia 5/5/2022    Hypertension associated with type 2  diabetes mellitus     Hypertension associated with type 2 diabetes mellitus     Hypotension     Intermittent confusion 4/29/2022    Overweight(278.02)     Prostate cancer 09/2011    followed by urology, Dr. Rogers    Pulmonary embolism     Reducible right inguinal hernia 10/10/2022    Right hemiparesis 7/22/2022    Secondary to cervical hematoma    Type II or unspecified type diabetes mellitus without mention of complication, not stated as uncontrolled     diet controlled    Urinary retention 4/29/2022       Past Surgical History:   Procedure Laterality Date    BRONCHOSCOPY N/A 10/15/2018    Procedure: BRONCHOSCOPY;  Surgeon: [facility];  Location: [facility] OR [address] [place] [address];  Service: Anesthesiology;  Laterality: N/A;    CATARACT EXTRACTION, BILATERAL  2014    POSTERIOR CERVICAL LAMINECTOMY Bilateral 4/28/2022    Procedure: LAMINECTOMY, SPINE, CERVICAL, POSTERIOR APPROACH C3-6;  Surgeon: Carlos Miller MD;  Location: [facility] OR [address] [place] [address];  Service: Neurosurgery;  Laterality: Bilateral;    PROSTATECTOMY      REPAIR, HERNIA, INGUINAL, WITHOUT HISTORY OF PRIOR REPAIR, AGE 5 YEARS OR OLDER Right 10/10/2022    Procedure: REPAIR, HERNIA, INGUINAL, WITHOUT HISTORY OF PRIOR REPAIR, AGE 5 YEARS OR OLDER;  Surgeon: Dario Esquivel MD;  Location: [facility] OR [address] [place] [address];  Service: General;  Laterality: Right;  open right inguinal hernia repair with mesh       Family History   Problem Relation Age of Onset    Colon cancer Mother     Diabetes Mother     Heart disease Father     Mental illness Sister     Diabetes Sister     Other Brother         polio as a child    Pulmonary fibrosis Brother     Diabetes Brother     Myasthenia gravis Brother     Parkinsonism Brother     Diabetes Brother     Dementia Brother     Lung cancer Brother         smoker    Diabetes Maternal Aunt     Diabetes Other     Esophageal cancer Neg Hx        Social History     Socioeconomic History    Marital status:      Spouse name: Addis    Number of  children: 2   Occupational History    Occupation: [employer]    Tobacco Use    Smoking status: Former     Packs/day: 0.25     Years: 5.00     Pack years: 1.25     Types: Cigarettes     Quit date: 10/2/1962     Years since quittin.3    Smokeless tobacco: Former    Tobacco comments:     quit age 23   Substance and Sexual Activity    Alcohol use: Not Currently    Drug use: No    Sexual activity: Yes     Partners: Female     Social Determinants of Health     Financial Resource Strain: Low Risk     Difficulty of Paying Living Expenses: Not hard at all   Food Insecurity: No Food Insecurity    Worried About Running Out of Food in the Last Year: Never true    Ran Out of Food in the Last Year: Never true   Transportation Needs: No Transportation Needs    Lack of Transportation (Medical): No    Lack of Transportation (Non-Medical): No   Physical Activity: Insufficiently Active    Days of Exercise per Week: 5 days    Minutes of Exercise per Session: 10 min   Stress: No Stress Concern Present    Feeling of Stress : Not at all   Social Connections: Moderately Isolated    Frequency of Communication with Friends and Family: More than three times a week    Frequency of Social Gatherings with Friends and Family: Once a week    Attends Voodoo Services: Never    Active Member of Clubs or Organizations: No    Attends Club or Organization Meetings: Never    Marital Status:    Housing Stability: Low Risk     Unable to Pay for Housing in the Last Year: No    Number of Places Lived in the Last Year: 1    Unstable Housing in the Last Year: No       Allergies:  Patient has no known allergies.    Medications:    Current Outpatient Medications:     aspirin (ECOTRIN) 81 MG EC tablet, Take 81 mg by mouth once daily., Disp: , Rfl:     azithromycin (ZITHROMAX) 500 MG tablet, Take 500 mg by mouth once daily., Disp: , Rfl:     b complex vitamins capsule, Take 1 capsule by mouth once daily., Disp: , Rfl:     ethambutoL (MYAMBUTOL)  400 MG Tab, Take 2 tablets (800 mg total) by mouth once daily., Disp: 60 tablet, Rfl: 5    nebulizer and compressor ([employer]am[employer] COMPRESSOR SYSTEM) Marcy, use to administer nebulized medication as directed, Disp: 1 each, Rfl: 0    pravastatin (PRAVACHOL) 10 MG tablet, ONE TABLET BY MOUTH once a day, Disp: 90 tablet, Rfl: 0    sodium chloride 3% 3 % nebulizer solution, USE 4 ML VIAL IN NEBULIZER  TWICE DAILY, Disp: 480 mL, Rfl: 11    sodium chloride 7% 7 % nebulizer solution, use one vial in nebulizer twice a day, Disp: , Rfl:     PHYSICAL EXAMINATION:  Physical Exam  Vitals and nursing note reviewed.   Constitutional:       General: He is awake.      Appearance: Normal appearance.   HENT:      Head: Normocephalic.      Right Ear: External ear normal.      Left Ear: External ear normal.      Nose: Nose normal.   Cardiovascular:      Rate and Rhythm: Normal rate.   Pulmonary:      Effort: Pulmonary effort is normal. No respiratory distress.   Abdominal:      Tenderness: There is no abdominal tenderness. There is no right CVA tenderness or left CVA tenderness.   Genitourinary:     Penis: Normal.       Testes: Normal.   Musculoskeletal:         General: No deformity.      Cervical back: Normal range of motion.   Skin:     General: Skin is warm and dry.   Neurological:      General: No focal deficit present.      Mental Status: He is alert and oriented to person, place, and time.   Psychiatric:         Mood and Affect: Mood normal.         Behavior: Behavior is cooperative.         LABS:      In office UA today was clear of active infection.    The PVR in the office today done immediately after urination by the nurse was 177.      Lab Results   Component Value Date    PSA 0.02 10/28/2015    PSADIAG 0.02 06/08/2022             IMPRESSION:    Encounter Diagnoses   Name Primary?    Urinary retention Yes    Prostate cancer     History of radical prostatectomy     Incomplete emptying of bladder          Assessment:       1. Urinary  retention    2. Prostate cancer    3. History of radical prostatectomy    4. Incomplete emptying of bladder        Plan:         I spent 30 minutes with the patient of which more than half was spent in direct consultation with the patient in regards to our treatment and plan.  We addressed the office findings and recent labs.   Education and recommendations of today's plan of care including home remedies and needed follow up with PCP.   We discussed the chief complaint/LUTS and the possible contributory factors.   Reassurance; he is emptying good for him.   No indication of any problems.  Discussed up coming surgery and possible risks.   F/u with me if any problems post op  RTC 6 months PVR; on day of other appts

## 2023-01-05 NOTE — HPI
This is a 82 y.o. male  who presents today for a preoperative evaluation in preparation for a Orthopedic  procedure.  Scheduled for  11/11/23  Surgery is indicated for right wrist surgery- nerve decompression.   Patient is new to me.  Details of current problem: The duration of problem is stated 4/28/22 after cervical spine surgery  Reports symptoms of  unable to pick things up, decreased  strength, numbness  Aggravating factors include:   attempting to pick things up, bumping his hand  Relieving factors are  none    Treated with  upcoming surgery   Reports pain: 0/10  The history has been obtained by speaking with the patient and reviewing the electronic medical record and/or outside health information. Significant health conditions for the perioperative period are discussed below in assessment and plan.     Patient reports current health status to be Good.  Denies any new symptoms before surgery.

## 2023-01-05 NOTE — ASSESSMENT & PLAN NOTE
Last seen per Dr. Pantoja 7/22/22  He was advised to stop anticoagulation at this visit  PAtient takes aspirin 81 mg daily

## 2023-01-05 NOTE — ASSESSMENT & PLAN NOTE
"[facility] with ID [facility] "Paola Spicer"  On antibiotics azithro/emb until infection cleared    Last notes  Sputum still showing positive culture  continue azith/ethambutol              -discussed potential adverse reactions with patient and wife, voiced understanding  -advised pt to reschedule optometry exam while on EMB  -called refills of azithro/emb to pharmacy   -continue inhaled hypertonic saline/aerobika  -monthly sputum AFBs - cups provided         "

## 2023-01-05 NOTE — ASSESSMENT & PLAN NOTE
Some impairment but no longer has  hemiparesis  He has a history hemiparesis that occurred with epidural hematome

## 2023-01-05 NOTE — PATIENT INSTRUCTIONS
Preventive perioperative care    Thromboembolic prophylaxis:  His risk factors for thrombosis include surgical procedure, age, and reduced mobility.I suggest  thromboembolic prophylaxis ( mechanical/pharmacological, weighing the risk benefits of pharmacological agent use considering cristiano procedural bleeding )  during the perioperative period.I suggested being active in the post operative period.      Postoperative pulmonary complication prophylaxis-Risk factors for post operative pulmonary complications include age over 65 years and ASA class >2- I suggest incentive spirometry use, early ambulation, and pain control so as to avoid diaphragmatic splinting  Brush teeth twice per day, oral rinses, sleep with the head of the bed up 30 degrees     Renal complication prophylaxis-Risk factors for renal complications include age . I suggest keeping him well hydrated and avoidance/ minimizing the use of  NSAID's,BRANTLEY 2 Inhibitors ,IV contrast if possible in the perioperative period.I suggested drinking 2 litre's of water a day      Surgical site Infection Prophylaxis-I  suggest appropriate antibiotic for Prophylaxis against Surgical site infections Shower with Hibiclens in the night before surgery and the morning of surgery     This visit was focused on Preoperative evaluation, Perioperative Medical management, complication reduction plans. I suggest that the patient follows up with primary care or relevant sub specialists for ongoing health care.    I appreciate the opportunity to be involved in this patients care. Please feel free to contact me if there were any questions about this consultation.    Patient is pending optimization

## 2023-01-05 NOTE — ASSESSMENT & PLAN NOTE
Surgery occurred 4/28/22- emergent posterior C3 through C6 laminectomy with posterolateral fusion for large epidural hematoma with quadriparesis.  This was done on 04/28/2022    Particiapated in Rehab and has regained ability to walk; Had urinary retention and is following up with Urology 1/5/23.  Last F/U visit was with Dr. Miller 7/22/22

## 2023-01-05 NOTE — ASSESSMENT & PLAN NOTE
EKG    Vent. Rate : 056 BPM     Atrial Rate : 056 BPM      P-R Int : 186 ms          QRS Dur : 088 ms       QT Int : 436 ms       P-R-T Axes : 050 052 055 degrees      QTc Int : 420 ms       Baseline Artifact Sinus bradycardia   Otherwise normal ECG   When compared with ECG of 13-MAY-2022 00:48,   No significant change was found   Confirmed by LOIS TEAGUE MD ([id number]) on 9/19/2022 4:24:40 PM           Specimen Collected: 09/19/22 00:53

## 2023-01-05 NOTE — PRE-PROCEDURE INSTRUCTIONS
Medication and surgery instructions given verbally to patient. Hard copy provided. Patient verbalized understanding.

## 2023-01-05 NOTE — ASSESSMENT & PLAN NOTE
Acute ischemic left MCA stroke  Neg on CTA  Given ASA at OSH before epidural hematoma found as cause of acute hemiplegia

## 2023-01-05 NOTE — DISCHARGE INSTRUCTIONS
Your surgery has been scheduled for:________1/11/23__________________________________    You should report to:  ____[facility], located on the [place] side of the first floor of the           [facility] ([phone])  ____[facility], located on the [facility] side of the            Second floor of the [facility] ([phone])  ____3rd Floor [facility] located on the [facility] side of the [facility] [phone]  __X__[facility]: located at [address] [place] [address]. [address]     Please Note   Tell your doctor if you take Aspirin, products containing Aspirin, herbal medications  or blood thinners, such as Coumadin, Ticlid, or Plavix.  (Consult your provider regarding holding or stopping before surgery).  Arrange for someone to drive you home following surgery.  You will not be allowed to leave the surgical facility alone or drive yourself home following sedation and anesthesia.    Before Surgery  Stop taking all herbal medications, vitamins, and supplements 7 days prior to surgery  No Motrin/Advil (Ibuprofen) 7 days before surgery  No Aleve (Naproxen) 7 days before surgery  Stop Taking Asprin, products containing Aspirin __7___days before surgery   No Goody's/BC Powder 7 days before surgery  Refrain from drinking alcoholic beverages for 24 hours before and after surgery  Stop or limit smoking at least 24 hours prior to surgery  You may take Tylenol for pain    Night before Surgery  Do not eat or drink after midnight  Take a shower or bath (shower is recommended).  Bathe with Hibiclens soap or an antibacterial soap from the neck down.  If not supplied by your surgeon, hibiclens soap will need to be purchased over the counter in pharmacy.  Rinse soap off thoroughly.  Shampoo your hair with your regular shampoo    The Day of Surgery  Take another bath or shower with hibiclens  or any antibacterial soap, to reduce the chance of infection.  Take heart and blood pressure medications with a small sip of water, as advised by the perioperative team.  Do not take fluid pills  You may brush your teeth and rinse your mouth, but do not swallow any additional water.   Do not apply perfumes, powder, body lotions or deodorant on the day of surgery.  Nail polish should be removed.  Do not wear makeup or moisturizer  Wear comfortable clothes, such as a button front shirt and loose fitting pants.  Leave all jewelry, including body piercings, and valuables at home.    Bring any devices you will neeed after surgery such as crutches or canes.  If you have sleep apnea, please bring your CPAP machine  In the event that your physical condition changes including the onset of a cold or respiratory illness, or if you have to delay or cancel your surgery, please notify your surgeon.

## 2023-01-05 NOTE — OUTPATIENT SUBJECTIVE & OBJECTIVE
Outpatient Subjective & Objective      Chief Complaint: Preoperative evaulation, perioperative medical management, and complication reduction plan.     Functional Capacity:  Able to climb a flight of stairs without CP SOB or Syncope.  Able to meet 4 METs       Anesthesia issues: None    Difficulty mouth opening: none    Steroid use in the last 12 months: none     Dental Issues: none    Family anesthesia difficulty: None known     Family Hx of Thrombosis none known    Past Medical History:   Diagnosis Date    Acute encephalopathy     Acute ischemic left MCA stroke     Acute ischemic left MCA stroke 4/28/2022    Anemia     Antiphospholipid syndrome 11/19/2021    Centrilobular emphysema     COPD (chronic obstructive pulmonary disease)     Dysphagia     Dysphagia, oropharyngeal 6/17/2016    - improved with speech therapy    Functional belching disorder 12/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 Dr. Pham    Hx of colonic polyps     followed by GI. Dr. Pham    Hyperlipidemia associated with type 2 diabetes mellitus     Hyperlipidemia LDL goal <100     Hypernatremia 5/5/2022    Hypertension associated with type 2 diabetes mellitus     Hypertension associated with type 2 diabetes mellitus     Hypotension     Intermittent confusion 4/29/2022    Overweight(278.02)     Prostate cancer 09/2011    followed by urology, Dr. Rogers    Pulmonary embolism     Reducible right inguinal hernia 10/10/2022    Right hemiparesis 7/22/2022    Secondary to cervical hematoma    Type II or unspecified type diabetes mellitus without mention of complication, not stated as uncontrolled     diet controlled    Urinary retention 4/29/2022     Past Surgical History:   Procedure Laterality Date    BRONCHOSCOPY N/A 10/15/2018    Procedure: BRONCHOSCOPY;  Surgeon: Pratibha Diagnostic Provider;  Location: [facility] OR [address] [place] [address];  Service: Anesthesiology;  Laterality: N/A;    CATARACT EXTRACTION, BILATERAL  2014    POSTERIOR  CERVICAL LAMINECTOMY Bilateral 4/28/2022    Procedure: LAMINECTOMY, SPINE, CERVICAL, POSTERIOR APPROACH C3-6;  Surgeon: Carlos Miller MD;  Location: [facility] OR [address] [place] [address];  Service: Neurosurgery;  Laterality: Bilateral;    PROSTATECTOMY      REPAIR, HERNIA, INGUINAL, WITHOUT HISTORY OF PRIOR REPAIR, AGE 7 YEARS OR OLDER Right 10/10/2022    Procedure: REPAIR, HERNIA, INGUINAL, WITHOUT HISTORY OF PRIOR REPAIR, AGE 5 YEARS OR OLDER;  Surgeon: Dario Esquivel MD;  Location: [address];  Service: General;  Laterality: Right;  open right inguinal hernia repair with mesh       Review of Systems   Constitutional:  Positive for fatigue. Negative for activity change, appetite change, chills, diaphoresis, fever and unexpected weight change.   HENT:  Negative for congestion, dental problem, drooling, trouble swallowing and voice change.    Eyes:  Negative for photophobia and visual disturbance.        No acute visual changes   Respiratory:  Negative for apnea, cough, choking, chest tightness, shortness of breath, wheezing and stridor.         STOP bang  Score 2  Low risk PERRY     Cardiovascular:  Negative for chest pain, palpitations and leg swelling.   Gastrointestinal:  Positive for constipation. Negative for abdominal pain, blood in stool, diarrhea, nausea and vomiting.        No FLD, Hepatitis, Cirrhosis  No BRB or black tarry stool   Endocrine: Positive for cold intolerance.   Genitourinary:  Negative for difficulty urinating, dysuria, frequency, hematuria and urgency.        Nocturia 1   Musculoskeletal:  Positive for arthralgias. Negative for gait problem, myalgias, neck pain and neck stiffness.        Shoulder pain   Neurological:  Positive for numbness. Negative for dizziness, seizures, syncope, light-headedness and headaches.   Psychiatric/Behavioral:  Negative for suicidal ideas. The patient is not nervous/anxious.             VITALS  Visit Vitals  /61 (BP Location: Right arm, Patient Position: Sitting)  "  Pulse 65   Temp 97.4 °F (36.3 °C) (Oral)   Ht 5' 9" (1.753 m)   Wt 72.6 kg (160 lb)   SpO2 97%   BMI 23.63 kg/m²          Physical Exam  Constitutional:       General: He is not in acute distress.     Appearance: He is well-developed. He is not diaphoretic.   HENT:      Head: Normocephalic.      Right Ear: Hearing normal.      Left Ear: Hearing normal.      Nose: Nose normal.   Eyes:      General: Lids are normal.      Conjunctiva/sclera: Conjunctivae normal.      Pupils: Pupils are equal, round, and reactive to light.   Neck:      Trachea: Trachea and phonation normal.   Cardiovascular:      Rate and Rhythm: Normal rate and regular rhythm.      Pulses:           Carotid pulses are 2+ on the right side and 2+ on the left side.       Radial pulses are 2+ on the right side and 2+ on the left side.        Posterior tibial pulses are 2+ on the right side and 2+ on the left side.      Heart sounds: Normal heart sounds. No murmur heard.    No friction rub. No gallop.   Pulmonary:      Effort: Pulmonary effort is normal.      Breath sounds: Normal breath sounds.   Abdominal:      General: Bowel sounds are normal. There is no distension.      Palpations: Abdomen is soft.      Tenderness: There is no abdominal tenderness.   Musculoskeletal:         General: No tenderness or deformity. Normal range of motion.      Cervical back: Normal range of motion.      Right lower leg: No edema.      Left lower leg: No edema.   Lymphadenopathy:      Head:      Right side of head: No submental, submandibular, tonsillar, preauricular, posterior auricular or occipital adenopathy.      Left side of head: No submental, submandibular, tonsillar, preauricular, posterior auricular or occipital adenopathy.      Cervical:      Right cervical: No superficial cervical adenopathy.     Left cervical: No superficial cervical adenopathy.   Skin:     General: Skin is warm and dry.      Capillary Refill: Capillary refill takes 2 to 3 seconds.      " Coloration: Skin is not pale.      Findings: No erythema or rash.   Neurological:      Mental Status: He is alert and oriented to person, place, and time.      GCS: GCS eye subscore is 4. GCS verbal subscore is 5. GCS motor subscore is 6.      Motor: No abnormal muscle tone.      Coordination: Coordination normal.   Psychiatric:         Attention and Perception: Attention and perception normal.         Mood and Affect: Mood and affect normal.         Speech: Speech normal.         Behavior: Behavior normal. Behavior is cooperative.         Thought Content: Thought content normal.         Cognition and Memory: Cognition and memory normal.         Judgment: Judgment normal.        Significant Labs:  Lab Results   Component Value Date    WBC 6.00 11/04/2022    HGB 10.1 (L) 11/04/2022    HCT 31.2 (L) 11/04/2022     11/04/2022    CHOL 116 (L) 04/28/2022    TRIG 88 04/28/2022    HDL 36 (L) 04/28/2022    ALT 14 10/21/2022    AST 21 10/21/2022     10/21/2022    K 3.9 10/21/2022     10/21/2022    CREATININE 1.1 10/21/2022    BUN 11 10/21/2022    CO2 24 10/21/2022    TSH 3.649 04/28/2022    PSA 0.02 10/28/2015    INR 1.1 09/19/2022    HGBA1C 5.7 (H) 04/28/2022       Diagnostic Studies: No relevant studies.    EKG:   Results for orders placed or performed during the hospital encounter of 09/18/22   EKG 12-lead    Collection Time: 09/19/22 12:53 AM    Narrative    Test Reason : R10.9,    Vent. Rate : 056 BPM     Atrial Rate : 056 BPM     P-R Int : 186 ms          QRS Dur : 088 ms      QT Int : 436 ms       P-R-T Axes : 050 052 055 degrees     QTc Int : 420 ms      Baseline Artifact Sinus bradycardia  Otherwise normal ECG  When compared with ECG of 13-MAY-2022 00:48,  No significant change was found  Confirmed by LOIS TEAGUE MD ([id number]) on 9/19/2022 4:24:40 PM    Referred By: [email or web address]   SELF           Confirmed By:LOIS TEAGUE MD       2D ECHO:  TTE:  Results for orders placed or performed during the  hospital encounter of 04/28/22   Echo   Result Value Ref Range    Ascending aorta 3.72 cm    STJ 3.47 cm    AV mean gradient 4 mmHg    Ao peak man 1.37 m/s    Ao VTI 27.67 cm    IVS 0.90 0.6 - 1.1 cm    LA size 3.24 cm    Left Atrium Major Axis 5.25 cm    Left Atrium Minor Axis 5.37 cm    LVIDd 4.70 3.5 - 6.0 cm    LVIDs 2.97 2.1 - 4.0 cm    LVOT diameter 2.02 cm    LVOT peak VTI 24.56 cm    Posterior Wall 0.89 0.6 - 1.1 cm    MV Peak A Man 1.01 m/s    E wave deceleration time 249.44 msec    MV Peak E Man 0.93 m/s    RA Major Axis 4.64 cm    RA Width 3.05 cm    RVDD 3.32 cm    Sinus 3.00 cm    TAPSE 2.72 cm    TDI LATERAL 0.11 m/s    TDI SEPTAL 0.09 m/s    LA WIDTH 3.55 cm    MV stenosis pressure 1/2 time 72.34 ms    LV Diastolic Volume 58.17 mL    LV Systolic Volume 34.08 mL    LVOT peak man 0.85 m/s    LV LATERAL E/E' RATIO 8.45 m/s    LV SEPTAL E/E' RATIO 10.33 m/s    FS 37 %    LA volume 51.91 cm3    LV mass 141.65 g    Left Ventricle Relative Wall Thickness 0.38 cm    AV valve area 2.84 cm2    AV Velocity Ratio 0.62     AV index (prosthetic) 0.89     MV valve area p 1/2 method 3.04 cm2    E/A ratio 0.92     Mean e' 0.10 m/s    LVOT area 3.2 cm2    LVOT stroke volume 78.67 cm3    AV peak gradient 8 mmHg    E/E' ratio 9.30 m/s    LV Systolic Volume Index 17.9 mL/m2    LV Diastolic Volume Index 30.62 mL/m2    LA Volume Index 27.3 mL/m2    LV Mass Index 75 g/m2    BSA 1.91 m2    Right Atrial Pressure (from IVC) 3 mmHg    EF 65 %    Narrative    · The estimated ejection fraction is 65%.  · The left ventricle is normal in size with normal systolic function.  · Normal left ventricular diastolic function.  · Normal right ventricular size with normal right ventricular systolic   function.  · Normal central venous pressure (3 mmHg).  · The ascending aorta is upper level of normal.          ANNETTE:  No results found for this or any previous visit.     Imaging     Active Cardiac Conditions: None      Revised Cardiac Risk  Index   High -Risk Surgery  Intraperitoneal; Intrathoracic; suprainguinal vascular Yes- + 1 No- 0   History of Ischemic Heart Disease   (Hx of MI/positive exercise test/current chest pain due to ischemia/use of nitrate therapy/EKG with pathological Q waves) Yes- + 1 No- 0   History of CHF  (Pulmonary edema/bilateral rales or S3 gallop/PND/CXR showing pulmonary vascular redistribution) Yes- + 1 No- 0   History of CVA   (Prior stroke or TIA) Yes- + 1 No- 0   Pre-operative treatment with insulin Yes- + 1 No- 0   Pre-operative creatinine > 2mg/dl Yes- + 1 No- 0   Total:      Risk Status:  Estimated risk of cardiac complications after non-cardiac surgery using the Revised Cardiac Risk Index for Preoperative risk is 3.9 %      ARISCAT (Canet) risk index: Low: 1.6% risk of post-op pulmonary complications.    American Society of Anesthesiologists Physical Status classification (ASA): 3           No further cardiac workup needed prior to surgery.    Outpatient Subjective & Objective

## 2023-01-09 ENCOUNTER — TELEPHONE (OUTPATIENT)
Dept: ORTHOPEDICS | Facility: CLINIC | Age: 83
End: 2023-01-09
Payer: MEDICARE

## 2023-01-09 NOTE — TELEPHONE ENCOUNTER
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   ----- Message -----  From: Haseeb Hawkins NP  Sent: 1/9/2023  12:33 PM CST  To: Romero Lazo MD, Elsa Martinez LPN, #  Subject: PAtient scheduled for surgery 1/11/23 with M#    Good day, Mr Gunter is scheduled for surgery 1/11/23 for decompression of ulnar nerve.  I wanted to touch base with you regarding his MAC infection- is he safe to proceed with this surgery and do you have any recommendations if he ok to proceed.  Thank you, Haseeb Hawkins [facility]

## 2023-01-10 ENCOUNTER — TELEPHONE (OUTPATIENT)
Dept: ORTHOPEDICS | Facility: CLINIC | Age: 83
End: 2023-01-10
Payer: MEDICARE

## 2023-01-10 ENCOUNTER — LAB VISIT (OUTPATIENT)
Dept: LAB | Facility: HOSPITAL | Age: 83
End: 2023-01-10
Attending: INTERNAL MEDICINE
Payer: MEDICARE

## 2023-01-10 DIAGNOSIS — A31.0 MYCOBACTERIUM AVIUM-INTRACELLULARE COMPLEX: ICD-10-CM

## 2023-01-10 PROBLEM — G56.21 GUYON SYNDROME, RIGHT: Status: ACTIVE | Noted: 2023-01-10

## 2023-01-10 PROBLEM — G56.01 CARPAL TUNNEL SYNDROME ON RIGHT: Status: ACTIVE | Noted: 2023-01-10

## 2023-01-10 PROCEDURE — 87118 MYCOBACTERIC IDENTIFICATION: CPT | Mod: 59 | Performed by: INTERNAL MEDICINE

## 2023-01-10 PROCEDURE — 87206 SMEAR FLUORESCENT/ACID STAI: CPT | Performed by: INTERNAL MEDICINE

## 2023-01-10 PROCEDURE — 87116 MYCOBACTERIA CULTURE: CPT | Performed by: INTERNAL MEDICINE

## 2023-01-10 PROCEDURE — 87015 SPECIMEN INFECT AGNT CONCNTJ: CPT | Performed by: INTERNAL MEDICINE

## 2023-01-10 PROCEDURE — 87154 CUL TYP ID BLD PTHGN 6+ TRGT: CPT | Performed by: INTERNAL MEDICINE

## 2023-01-10 RX ORDER — TRAMADOL HYDROCHLORIDE 50 MG/1
50 TABLET ORAL EVERY 6 HOURS PRN
Qty: 20 TABLET | Refills: 0 | Status: SHIPPED | OUTPATIENT
Start: 2023-01-10 | End: 2023-02-09

## 2023-01-10 RX ORDER — ACETAMINOPHEN 500 MG
500 TABLET ORAL EVERY 6 HOURS PRN
Qty: 21 TABLET | Refills: 0 | Status: SHIPPED | OUTPATIENT
Start: 2023-01-10 | End: 2023-01-17

## 2023-01-10 RX ORDER — IBUPROFEN 600 MG/1
600 TABLET ORAL EVERY 6 HOURS PRN
Qty: 21 TABLET | Refills: 0 | Status: SHIPPED | OUTPATIENT
Start: 2023-01-10 | End: 2023-02-09

## 2023-01-10 NOTE — PATIENT INSTRUCTIONS
"You will be discharged on a "multi-modal" pain regimen. This means that different medications are used to control your pain. Each medication works differently to control your pain so that your pain control is optimized.    You have been prescribed Ultram (Tramadol). You have been prescribed 20 pills. You may also take Motrin (Ibuprofen) and Tyelnol (Acetaminophen).    Norco and Percocet contain tylenol, do not take tylenol with these medications if you were prescribed them.     Do not bear weight on the operative extremity.    Leave your dressing on until your follow up appointment.     Call us if you experience: fever, chills, chest pain, shortness of breath, severe headache, pain not relieved by oral medications, increased pain and swelling at the operative site, or any other questions or concerns. We are happy to assist you at any time.    "

## 2023-01-10 NOTE — TELEPHONE ENCOUNTER
Spoke with the patient. Notified of 630 AM arrival time to the [facility], [facility].  Informed of current visitor policy.  Reminded of NPO and need for transportation. Patient verbalized understanding to all.    Barbara Lopez MS, OTC  Clinical Assistant to Dr. Ross Dunbar [facility]

## 2023-01-11 ENCOUNTER — HOSPITAL ENCOUNTER (OUTPATIENT)
Facility: HOSPITAL | Age: 83
Discharge: HOME | End: 2023-01-11
Attending: ORTHOPAEDIC SURGERY | Admitting: ORTHOPAEDIC SURGERY
Payer: MEDICARE

## 2023-01-11 ENCOUNTER — ANESTHESIA (OUTPATIENT)
Dept: SURGERY | Facility: HOSPITAL | Age: 83
End: 2023-01-11
Payer: MEDICARE

## 2023-01-11 VITALS
HEIGHT: 69 IN | OXYGEN SATURATION: 99 % | WEIGHT: 159 LBS | DIASTOLIC BLOOD PRESSURE: 71 MMHG | TEMPERATURE: 97 F | HEART RATE: 45 BPM | BODY MASS INDEX: 23.55 KG/M2 | RESPIRATION RATE: 10 BRPM | SYSTOLIC BLOOD PRESSURE: 137 MMHG

## 2023-01-11 DIAGNOSIS — G56.21 GUYON SYNDROME, RIGHT: ICD-10-CM

## 2023-01-11 DIAGNOSIS — G56.21 CUBITAL TUNNEL SYNDROME ON RIGHT: ICD-10-CM

## 2023-01-11 DIAGNOSIS — G56.00 CARPAL TUNNEL SYNDROME: ICD-10-CM

## 2023-01-11 DIAGNOSIS — G56.01 CARPAL TUNNEL SYNDROME ON RIGHT: Primary | ICD-10-CM

## 2023-01-11 PROCEDURE — 64721 PR REVISE MEDIAN N/CARPAL TUNNEL SURG: ICD-10-PCS | Mod: RT,,, | Performed by: ORTHOPAEDIC SURGERY

## 2023-01-11 PROCEDURE — 71000015 HC POSTOP RECOV 1ST HR: Performed by: ORTHOPAEDIC SURGERY

## 2023-01-11 PROCEDURE — 99900035 HC TECH TIME PER 15 MIN (STAT)

## 2023-01-11 PROCEDURE — 63600175 PHARM REV CODE 636 W HCPCS: Performed by: NURSE ANESTHETIST, CERTIFIED REGISTERED

## 2023-01-11 PROCEDURE — D9220A PRA ANESTHESIA: Mod: CRNA,,, | Performed by: NURSE ANESTHETIST, CERTIFIED REGISTERED

## 2023-01-11 PROCEDURE — 94761 N-INVAS EAR/PLS OXIMETRY MLT: CPT

## 2023-01-11 PROCEDURE — 63600175 PHARM REV CODE 636 W HCPCS: Performed by: ORTHOPAEDIC SURGERY

## 2023-01-11 PROCEDURE — 76942 ECHO GUIDE FOR BIOPSY: CPT | Performed by: STUDENT IN AN ORGANIZED HEALTH CARE EDUCATION/TRAINING PROGRAM

## 2023-01-11 PROCEDURE — 25000003 PHARM REV CODE 250: Performed by: NURSE ANESTHETIST, CERTIFIED REGISTERED

## 2023-01-11 PROCEDURE — D9220A PRA ANESTHESIA: Mod: ANES,,, | Performed by: ANESTHESIOLOGY

## 2023-01-11 PROCEDURE — 64415 PR NERVE BLOCK INJ, ANES/STEROID, BRACHIAL PLEXUS, INCL IMAG GUIDANCE: ICD-10-PCS | Mod: 59,RT,, | Performed by: ANESTHESIOLOGY

## 2023-01-11 PROCEDURE — 64719 REVISE ULNAR NERVE AT WRIST: CPT | Mod: 59,RT,, | Performed by: ORTHOPAEDIC SURGERY

## 2023-01-11 PROCEDURE — 64721 CARPAL TUNNEL SURGERY: CPT | Mod: RT,,, | Performed by: ORTHOPAEDIC SURGERY

## 2023-01-11 PROCEDURE — 25000003 PHARM REV CODE 250: Performed by: ANESTHESIOLOGY

## 2023-01-11 PROCEDURE — 36000707: Performed by: ORTHOPAEDIC SURGERY

## 2023-01-11 PROCEDURE — 27201423 OPTIME MED/SURG SUP & DEVICES STERILE SUPPLY: Performed by: ORTHOPAEDIC SURGERY

## 2023-01-11 PROCEDURE — 71000033 HC RECOVERY, INTIAL HOUR: Performed by: ORTHOPAEDIC SURGERY

## 2023-01-11 PROCEDURE — 25000003 PHARM REV CODE 250: Performed by: STUDENT IN AN ORGANIZED HEALTH CARE EDUCATION/TRAINING PROGRAM

## 2023-01-11 PROCEDURE — 36000706: Performed by: ORTHOPAEDIC SURGERY

## 2023-01-11 PROCEDURE — 64719 PR REVISE ULNAR NERVE AT WRIST: ICD-10-PCS | Mod: 59,RT,, | Performed by: ORTHOPAEDIC SURGERY

## 2023-01-11 PROCEDURE — 25000003 PHARM REV CODE 250: Performed by: ORTHOPAEDIC SURGERY

## 2023-01-11 PROCEDURE — 37000009 HC ANESTHESIA EA ADD 15 MINS: Performed by: ORTHOPAEDIC SURGERY

## 2023-01-11 PROCEDURE — D9220A PRA ANESTHESIA: ICD-10-PCS | Mod: CRNA,,, | Performed by: NURSE ANESTHETIST, CERTIFIED REGISTERED

## 2023-01-11 PROCEDURE — D9220A PRA ANESTHESIA: ICD-10-PCS | Mod: ANES,,, | Performed by: ANESTHESIOLOGY

## 2023-01-11 PROCEDURE — 64718 REVISE ULNAR NERVE AT ELBOW: CPT | Mod: RT,,, | Performed by: ORTHOPAEDIC SURGERY

## 2023-01-11 PROCEDURE — 64415 NJX AA&/STRD BRCH PLXS IMG: CPT | Mod: 59,RT,, | Performed by: ANESTHESIOLOGY

## 2023-01-11 PROCEDURE — 63600175 PHARM REV CODE 636 W HCPCS: Performed by: STUDENT IN AN ORGANIZED HEALTH CARE EDUCATION/TRAINING PROGRAM

## 2023-01-11 PROCEDURE — 64718 PR REVISE ULNAR NERVE AT ELBOW: ICD-10-PCS | Mod: RT,,, | Performed by: ORTHOPAEDIC SURGERY

## 2023-01-11 PROCEDURE — 37000008 HC ANESTHESIA 1ST 15 MINUTES: Performed by: ORTHOPAEDIC SURGERY

## 2023-01-11 RX ORDER — FENTANYL CITRATE 50 UG/ML
25-200 INJECTION, SOLUTION INTRAMUSCULAR; INTRAVENOUS
Status: DISCONTINUED | OUTPATIENT
Start: 2023-01-11 | End: 2023-01-11 | Stop reason: HOSPADM

## 2023-01-11 RX ORDER — DEXAMETHASONE SODIUM PHOSPHATE 4 MG/ML
INJECTION, SOLUTION INTRA-ARTICULAR; INTRALESIONAL; INTRAMUSCULAR; INTRAVENOUS; SOFT TISSUE
Status: DISCONTINUED | OUTPATIENT
Start: 2023-01-11 | End: 2023-01-11

## 2023-01-11 RX ORDER — ACETAMINOPHEN 500 MG
1000 TABLET ORAL
Status: COMPLETED | OUTPATIENT
Start: 2023-01-11 | End: 2023-01-11

## 2023-01-11 RX ORDER — LIDOCAINE HYDROCHLORIDE 20 MG/ML
INJECTION, SOLUTION EPIDURAL; INFILTRATION; INTRACAUDAL; PERINEURAL
Status: COMPLETED | OUTPATIENT
Start: 2023-01-11 | End: 2023-01-11

## 2023-01-11 RX ORDER — MUPIROCIN 20 MG/G
OINTMENT TOPICAL
Status: DISCONTINUED | OUTPATIENT
Start: 2023-01-11 | End: 2023-01-11 | Stop reason: HOSPADM

## 2023-01-11 RX ORDER — ACETAMINOPHEN 325 MG/1
325 TABLET ORAL EVERY 6 HOURS PRN
COMMUNITY
End: 2023-02-09

## 2023-01-11 RX ORDER — LIDOCAINE HCL/PF 100 MG/5ML
SYRINGE (ML) INTRAVENOUS
Status: DISCONTINUED | OUTPATIENT
Start: 2023-01-11 | End: 2023-01-11

## 2023-01-11 RX ORDER — ONDANSETRON 2 MG/ML
4 INJECTION INTRAMUSCULAR; INTRAVENOUS ONCE AS NEEDED
Status: DISCONTINUED | OUTPATIENT
Start: 2023-01-11 | End: 2023-01-11 | Stop reason: HOSPADM

## 2023-01-11 RX ORDER — MIDAZOLAM HYDROCHLORIDE 1 MG/ML
.5-4 INJECTION INTRAMUSCULAR; INTRAVENOUS
Status: DISCONTINUED | OUTPATIENT
Start: 2023-01-11 | End: 2023-01-11 | Stop reason: HOSPADM

## 2023-01-11 RX ORDER — FAMOTIDINE 10 MG/ML
INJECTION INTRAVENOUS
Status: DISCONTINUED | OUTPATIENT
Start: 2023-01-11 | End: 2023-01-11

## 2023-01-11 RX ORDER — PROPOFOL 10 MG/ML
VIAL (ML) INTRAVENOUS CONTINUOUS PRN
Status: DISCONTINUED | OUTPATIENT
Start: 2023-01-11 | End: 2023-01-11

## 2023-01-11 RX ORDER — CELECOXIB 200 MG/1
400 CAPSULE ORAL ONCE
Status: COMPLETED | OUTPATIENT
Start: 2023-01-11 | End: 2023-01-11

## 2023-01-11 RX ORDER — BUPIVACAINE HYDROCHLORIDE 5 MG/ML
INJECTION, SOLUTION EPIDURAL; INTRACAUDAL
Status: COMPLETED | OUTPATIENT
Start: 2023-01-11 | End: 2023-01-11

## 2023-01-11 RX ORDER — KETAMINE HCL IN 0.9 % NACL 50 MG/5 ML
SYRINGE (ML) INTRAVENOUS
Status: DISCONTINUED | OUTPATIENT
Start: 2023-01-11 | End: 2023-01-11

## 2023-01-11 RX ORDER — SODIUM CHLORIDE 0.9 % (FLUSH) 0.9 %
3 SYRINGE (ML) INJECTION
Status: DISCONTINUED | OUTPATIENT
Start: 2023-01-11 | End: 2023-01-11 | Stop reason: HOSPADM

## 2023-01-11 RX ORDER — ONDANSETRON 2 MG/ML
INJECTION INTRAMUSCULAR; INTRAVENOUS
Status: DISCONTINUED | OUTPATIENT
Start: 2023-01-11 | End: 2023-01-11

## 2023-01-11 RX ADMIN — CELECOXIB 400 MG: 200 CAPSULE ORAL at 07:01

## 2023-01-11 RX ADMIN — PROPOFOL 50 MCG/KG/MIN: 10 INJECTION, EMULSION INTRAVENOUS at 08:01

## 2023-01-11 RX ADMIN — DEXAMETHASONE SODIUM PHOSPHATE 4 MG: 4 INJECTION, SOLUTION INTRAMUSCULAR; INTRAVENOUS at 08:01

## 2023-01-11 RX ADMIN — MIDAZOLAM 0.5 MG: 1 INJECTION INTRAMUSCULAR; INTRAVENOUS at 08:01

## 2023-01-11 RX ADMIN — CEFAZOLIN 2 G: 2 INJECTION, POWDER, FOR SOLUTION INTRAMUSCULAR; INTRAVENOUS at 08:01

## 2023-01-11 RX ADMIN — FENTANYL CITRATE 100 MCG: 50 INJECTION, SOLUTION INTRAMUSCULAR; INTRAVENOUS at 08:01

## 2023-01-11 RX ADMIN — BUPIVACAINE HYDROCHLORIDE 25 ML: 5 INJECTION, SOLUTION EPIDURAL; INTRACAUDAL; PERINEURAL at 08:01

## 2023-01-11 RX ADMIN — LIDOCAINE HYDROCHLORIDE 5 ML: 20 INJECTION, SOLUTION EPIDURAL; INFILTRATION; INTRACAUDAL; PERINEURAL at 08:01

## 2023-01-11 RX ADMIN — LIDOCAINE HYDROCHLORIDE 100 MG: 20 INJECTION, SOLUTION INTRAVENOUS at 08:01

## 2023-01-11 RX ADMIN — Medication 20 MG: at 08:01

## 2023-01-11 RX ADMIN — MUPIROCIN: 20 OINTMENT TOPICAL at 07:01

## 2023-01-11 RX ADMIN — ACETAMINOPHEN 1000 MG: 500 TABLET ORAL at 07:01

## 2023-01-11 RX ADMIN — ONDANSETRON 4 MG: 2 INJECTION INTRAMUSCULAR; INTRAVENOUS at 08:01

## 2023-01-11 RX ADMIN — FAMOTIDINE 20 MG: 10 INJECTION, SOLUTION INTRAVENOUS at 08:01

## 2023-01-11 RX ADMIN — SODIUM CHLORIDE: 9 INJECTION, SOLUTION INTRAVENOUS at 07:01

## 2023-01-11 NOTE — H&P
[facility]  History & Physical  Orthopedics     SUBJECTIVE:       Chief Complaint: Right hand swelling and numbness     Referring Provider: No ref. provider found      Dr. Lazo is the supervising physician for this encounter/patient     History of Present Illness:  Patient is a 82 y.o. right hand dominant male who presents today with complaints of right hand pain and swelling since April 2022. Family reports a spinal column bleed in April 2022 requiring surgery, while inpatient for this, started to develop hand issues. He reports swelling off/on, had US eval done in the ED 9/16/22 which was negative for any thrombus. He reports painful numbness/tingling into the hand, wakes up at night often with this, will also occur throughout the day.      Interval history December 6, 2022: The patient returns today with his family members.  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  He has had significant dysfunction particularly to the right upper extremity ever since that time and this includes constant numbness and tingling in the right index long ring and small fingers as well as significant dysfunction and weakness.  He was recently sent for an EMG and returns for these results and re-evaluation.  No new complaints.  He notes that he is no longer on any anticoagulation which he was previously on for his history of clotting and antiphospholipid syndrome.  No blood thinners these days.     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     Onset of symptoms/DOI was months.     Symptoms are aggravated by activity, movement, at night, and during the day.     Symptoms are alleviated by activity.     Symptoms consist of pain, swelling, decreased ROM, and numbness/tingling.     The patient rates their pain as a 5/10.     Attempted treatment(s) and/or interventions include activity  modifications, rest, physical and/or occupational therapy.     The patient denies any fevers, chills, N/V, D/C and presents for evaluation.                Past Medical History:   Diagnosis Date    Antiphospholipid syndrome 11/19/2021    Dysphagia      Hx of colonic polyps       followed by GI. Dr. Pham    Hyperlipidemia LDL goal <100      Overweight(278.02)      Prostate cancer september 2011     followed by urology, Dr. Rogers    Type II or unspecified type diabetes mellitus without mention of complication, not stated as uncontrolled       diet controlled                Past Surgical History:   Procedure Laterality Date    BRONCHOSCOPY N/A 10/15/2018     Procedure: BRONCHOSCOPY;  Surgeon: [facility];  Location: [facility] OR [address] [place] [address];  Service: Anesthesiology;  Laterality: N/A;    CATARACT EXTRACTION, BILATERAL   2014    POSTERIOR CERVICAL LAMINECTOMY Bilateral 4/28/2022     Procedure: LAMINECTOMY, SPINE, CERVICAL, POSTERIOR APPROACH C3-6;  Surgeon: Carlos Miller MD;  Location: [facility] OR [address] [place] [address];  Service: Neurosurgery;  Laterality: Bilateral;    PROSTATECTOMY        REPAIR, HERNIA, INGUINAL, WITHOUT HISTORY OF PRIOR REPAIR, AGE 5 YEARS OR OLDER Right 10/10/2022     Procedure: REPAIR, HERNIA, INGUINAL, WITHOUT HISTORY OF PRIOR REPAIR, AGE 5 YEARS OR OLDER;  Surgeon: Dario Esquivel MD;  Location: [facility] OR [address] [place] [address];  Service: General;  Laterality: Right;  open right inguinal hernia repair with mesh      Review of patient's allergies indicates:  No Known Allergies  Social History            Social History Narrative    Not on file                Family History   Problem Relation Age of Onset    Colon cancer Mother      Diabetes Mother      Heart disease Father      Mental illness Sister      Diabetes Sister      Other Brother           polio as a child    Pulmonary fibrosis Brother      Diabetes Brother      Myasthenia gravis Brother      Parkinsonism Brother      Diabetes Brother      Dementia Brother    "   Lung cancer Brother           smoker    Diabetes Maternal Aunt      Diabetes Other      Esophageal cancer Neg Hx              Current Outpatient Medications:     aspirin (ECOTRIN) 81 MG EC tablet, Take 81 mg by mouth once daily., Disp: , Rfl:     azithromycin (ZITHROMAX) 500 MG tablet, Take 500 mg by mouth once daily., Disp: , Rfl:     b complex vitamins capsule, Take 1 capsule by mouth once daily., Disp: , Rfl:     ethambutoL (MYAMBUTOL) 400 MG Tab, Take 2 tablets (800 mg total) by mouth once daily., Disp: 60 tablet, Rfl: 5    HYDROcodone-acetaminophen (NORCO) 5-325 mg per tablet, Take 1 tablet by mouth every 6 (six) hours as needed for Pain., Disp: 21 tablet, Rfl: 0    nebulizer and compressor (BlockTrail COMPRESSOR SYSTEM) Marcy, use to administer nebulized medication as directed, Disp: 1 each, Rfl: 0    polyethylene glycol (GOLYTELY) 236-22.74-6.74 -5.86 gram suspension, Day 1: Dose 1, 4000ml. Day 2: Dose 2, 4000ml. For total of 8000ml., Disp: 8000 mL, Rfl: 0    pravastatin (PRAVACHOL) 10 MG tablet, ONE TABLET BY MOUTH once a day, Disp: 90 tablet, Rfl: 0    sodium chloride 3% 3 % nebulizer solution, USE 4 ML VIAL IN NEBULIZER  TWICE DAILY, Disp: 480 mL, Rfl: 11    sodium chloride 7% 7 % nebulizer solution, use one vial in nebulizer twice a day, Disp: , Rfl:         Review of Systems:  Constitutional: no fever or chills  Eyes: no visual changes  ENT: no nasal congestion or sore throat  Respiratory: no cough or shortness of breath  Cardiovascular: no chest pain  Gastrointestinal: no nausea or vomiting, tolerating diet  Musculoskeletal: pain, soreness, numbness/tingling, and decreased ROM     OBJECTIVE:       Vital Signs (Most Recent):  Vitals         Vitals:     12/06/22 0929   Weight: 72.6 kg (160 lb)   Height: 5' 9" (1.753 m)         Body mass index is 23.63 kg/m².        Physical Exam:  Constitutional: The patient appears well-developed and well-nourished. No distress.   Skin: No lesions appreciated  Head: " Normocephalic and atraumatic.   Nose: Nose normal.   Ears: No deformities seen  Eyes: Conjunctivae and EOM are normal.   Neck: No tracheal deviation present.   Cardiovascular: Normal rate and intact distal pulses.    Pulmonary/Chest: Effort normal. No respiratory distress.   Abdominal: There is no guarding.   Neurological: The patient is alert.   Psychiatric: The patient has a normal mood and affect.      Right Hand/Wrist Examination:     Observation/Inspection:  Swelling                       Pos - generalized          Deformity                     Index DIP amputation. Dupuytrens nodules noted palmar ring, ring and small finger flexion contractures noted  Discoloration               none                  Scars                           none                  Atrophy                        intrinsic/1st dorsal interosseous-moderate     HAND/WRIST EXAMINATION:  Finkelstein's Test                                unable to assess/not assessed secondary to condition  WHAT Test                                         unable to assess/not assessed secondary to condition  Snuff box tenderness                          unable to assess/not assessed secondary to condition  Sharp's Test                                     unable to assess/not assessed secondary to condition  Hook of Hamate Tenderness              unable to assess/not assessed secondary to condition  CMC grind                                           unable to assess/not assessed secondary to condition  Circumduction test                              unable to assess/not assessed secondary to condition     Neurovascular Exam:  Digits WWP, brisk CR < 3s throughout  NVI motor/LTS to M/R/U nerves, radial pulse 2+ except for decreased light touch sensation at baseline to the right index long ring and small fingers; global weakness throughout all major muscle groups particularly to ulnar innervated intrinsic muscles; inability to cross the 2nd and 3rd digits.   Significant weakness with digital abduction and adduction as well as small finger opposition  Tinel's Test - Carpal Tunnel                POS  Tinel's Test - Cubital Tunnel               Pos  Phalen's Test                                      POS  Median Nerve Compression Test       POS     ROM hand: decreased flexion of all digits. Small PIP 40 degree contracture, Long PIP 15 degree contracture.     ROM wrist full, painless    ROM elbow full, painless     Abdomen not guarded  Respirations nonlabored  Perfusion intact     Diagnostic Results:     Imaging - I independently viewed the patient's imaging as well as the radiology report.       FINDINGS:  Prior amputation distal phalanx 2nd digit.     I see no acute fracture.     Osteophyte formation, subchondral sclerosis, subchondral cyst formation, and advanced narrowing 1st CMC joint.  Mild degenerative changes scattered in the interphalangeal joints.     Narrowing of the distal radioulnar joint.     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  Note that there is significant motor axonal loss and atrophy. This could be due to the ulnar mononeuropathy, or could be related to cervical issues at C8/T1 potentially.   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 Carpal tunnel syndrome).  There is motor axonal loss on the right.  As above, this could be related to the cervical issues or the median mononeuropathy.  There is active denervation.  This is graded as Severe in severity on the right, Moderate on the left.    Lastly, there are chronic neuropathic changes in the right C5/6 myotomes (as well as significant atrophy of some of these muscles), consistent with a chronic C5 and/or C6 cervical radiculopathy.  Did not check paraspinals given the history of posterior cervical surgery.  As  mentioned above, some of the changes in the hand muscles may also be related to the cervical spine.          ___________________________  Ti Edouard D.O.     ASSESSMENT/PLAN:       82 y.o. yo male with Right carpal tunnel syndrome, cubital tunnel syndrome, Guyon's ulnar nerve compression, Dupuytren's contractures right hand paragraph  Plan: The patient and I had a thorough discussion today.  We discussed the working diagnosis as well as several other potential alternative diagnoses.  Treatment options were discussed, both conservative and surgical.  Conservative treatment options would include things such as activity modifications, workplace modifications, a period of rest, oral vs topical OTC and prescription anti-inflammatory medications, occupational therapy, splinting/bracing, immobilization, corticosteroid injections, and others.  Surgical options were discussed as well.      At this time, the patient has significant neurologic dysfunction in the entire right upper extremity.  We had a very thorough, Avery, and extensive discussion that the etiology of this is likely multifactorial.  I do believe that the majority of his pathology lies in his cervical spinal issues but he certainly has strongly positive Tinel's at both the right wrist and elbow with EMG evidence of focal mononeuropathy of the right ulnar nerve at the wrist as well as the median nerve at the wrist.  I believe is reasonable to consider and proceed with a right carpal tunnel release, Guyon's ulnar nerve release, and cubital tunnel release.  He understands that his hand contractures are secondary to Dupuytren's which will not be addressed with this procedure.  Furthermore, we discussed explicitly that I can not guarantee him any particular outcome after these surgeries.  He may improve but he may not and he would like to proceed as planned.  We will proceed with right ulnar nerve decompression at the wrist and elbow as well as a right  carpal tunnel release on January 11, 2023.  Preop clinic for optimization prior to surgery.     The patient has not responded to adequate non operative treatment at this time and/or non operative treatment is not indicated. Thus, the risks, benefits and alternatives to surgery were discussed with the patient in detail.  Specific risks include but are not limited to bleeding, infection, vessel and/or nerve damage, pain, numbness, tingling, complex regional pain syndrome, compartment syndrome, failure to return to pre-injury and/or preoperative functional status, scar sensitivity, delayed healing, inability to return to work, pulley injury, tendon injury, bowstringing, partial and/or incomplete relief of symptoms, weakness, persistence of and/or worsening of symptoms, surgical failure, osteomyelitis, amputation, loss of function, stiffness, functional debility, dysfunction, decreased  strength, need for prolonged postoperative rehabilitation, need for further surgery, deep venous thrombosis, pulmonary embolism, arthritis and death.  The patient states an understanding and wishes to proceed with surgery.   All questions were answered.  No guarantees were implied or stated.  Written informed consent was obtained.        Please do not hesitate to reach out to us via email, phone, or MyChart with any questions, concerns, or feedback.

## 2023-01-11 NOTE — TRANSFER OF CARE
"Anesthesia Transfer of Care Note    Patient: Diego Gunter    Procedure(s) Performed: Procedure(s) (LRB):  DECOMPRESSION, NERVE, ULNAR - right cub jolie and guyon's decompression as well as open right carpal tunnel release (Right)  RELEASE, CARPAL TUNNEL (Right)    Patient location: PACU    Anesthesia Type: general    Transport from OR: Transported from OR on 6-10 L/min O2 by face mask with adequate spontaneous ventilation    Post pain: adequate analgesia    Post assessment: no apparent anesthetic complications    Post vital signs: stable    Level of consciousness: sedated    Nausea/Vomiting: no nausea/vomiting    Complications: none    Transfer of care protocol was followed      Last vitals:   Visit Vitals  BP (!) 104/56   Pulse (!) 59   Temp 36.4 °C (97.5 °F) (Oral)   Resp 10   Ht 5' 9" (1.753 m)   Wt 72.1 kg (159 lb)   SpO2 96%   BMI 23.48 kg/m²     "

## 2023-01-11 NOTE — PLAN OF CARE
Pre Op checklist complete. Pt resting in bed with call light in reach and all questions answered. Pt grandson at bedside and spouse brought to bedside. Pt still needs anes consent.

## 2023-01-11 NOTE — INTERVAL H&P NOTE
The patient has been examined and the H&P has been reviewed:    I concur with the findings and no changes have occurred since H&P was written.    Surgery risks, benefits and alternative options discussed and understood by patient/family.    The patient has not responded to adequate non operative treatment at this time and/or non operative treatment is not indicated. Thus, the risks, benefits and alternatives to surgery were discussed with the patient in detail.  Specific risks include but are not limited to bleeding, infection, vessel and/or nerve damage, pain, numbness, tingling, compartment syndrome, need for additional surgery, failure to return to pre-injury and/or preoperative functional status, inability to return to work, scar sensitivity, delayed healing, complex regional pain syndrome, weakness, pulley injury, tendon injury, bowstringing, partial and/or incomplete relief of symptoms, persistence of and/or worsening of symptoms, hardware and/or surgical failure, prominent and/or symptomatic hardware possibly necessitating future removal, osteomyelitis, amputation, loss of function, stiffness, rotational malalignment, functional debility, dysfunction, decreased  strength, need for prolonged postoperative rehabilitation, malunion, nonunion, deep venous thrombosis, pulmonary embolism, arthritis and death.  The patient states an understanding and wishes to proceed with surgery.   All questions were answered.  No guarantees were implied or stated.  Written informed consent was obtained.        Active Hospital Problems    Diagnosis  POA    *Cubital tunnel syndrome on right [G56.21]  Yes    Carpal tunnel syndrome on right [G56.01]  Yes      Resolved Hospital Problems   No resolved problems to display.

## 2023-01-11 NOTE — PROGRESS NOTES
Pt alert, oriented, VSS, ready for discharge. Printed AVS given to patient and spouse, verbalized instructions with patient and spouse who both verbalize understanding. Pt's PIV removed without complication. Pt able to get dressed with assistance from spouse and RN. Pt scooted from bed to wheelchair without complication. Pt was wheeled from PACU to pt's grandson's vehicle, pt was helped into vehicle by RN and grandson without incident.

## 2023-01-11 NOTE — ANESTHESIA PROCEDURE NOTES
Right supraclavicular nerve block single shot    Patient location during procedure: pre-op   Block not for primary anesthetic.  Reason for block: at surgeon's request and post-op pain management   Post-op Pain Location: Right arm pain   Start time: 1/11/2023 8:14 AM  Timeout: 1/11/2023 8:13 AM   End time: 1/11/2023 8:16 AM    Staffing  Authorizing Provider: Lux Navarrete MD  Performing Provider: Kamari Patiño MD    Preanesthetic Checklist  Completed: patient identified, IV checked, site marked, risks and benefits discussed, surgical consent, monitors and equipment checked, pre-op evaluation and timeout performed  Peripheral Block  Patient position: supine  Prep: ChloraPrep  Patient monitoring: heart rate, cardiac monitor, continuous pulse ox, continuous capnometry and frequent blood pressure checks  Block type: supraclavicular  Laterality: right  Injection technique: single shot  Needle  Needle type: Stimuplex   Needle gauge: 22 G  Needle length: 2 in  Needle localization: anatomical landmarks and ultrasound guidance   -ultrasound image captured on disc.  Assessment  Injection assessment: negative aspiration, negative parasthesia and local visualized surrounding nerve  Paresthesia pain: none  Heart rate change: no  Slow fractionated injection: yes  Pain Tolerance: comfortable throughout block and no complaints  Medications:    Medications: bupivacaine (pf) (MARCAINE) injection 0.5% - Perineural   25 mL - 1/11/2023 8:16:00 AM  lidocaine (PF) 20 mg/mL (2%) injection - Other   5 mL - 1/11/2023 8:16:00 AM    Additional Notes  VSS.  [facility] RN monitoring vitals throughout procedure.  Patient tolerated procedure well.

## 2023-01-11 NOTE — OP NOTE
DATE OF PROCEDURE:  01/11/2023     SERVICE:  Orthopedics.     SURGEON:  Romero Lazo M.D.     FIRST ASSISTANT:   PUSHPA Fraser M.D. (RES)     PREOPERATIVE DIAGNOSES:  Ulnar tunnel syndrome with compression of the ulnar nerve in Guyon's canal   at the level of the right wrist.  2.  Carpal tunnel syndrome, right upper extremity.  3.  Cubital tunnel syndrome, right elbow     POSTOPERATIVE DIAGNOSES:  Same     PROCEDURES PERFORMED:  1.  Ulnar nerve decompression at the level of the right wrist and hand for a Guyon's   decompression.  2.  Carpal tunnel release of right upper extremity.  3.  Cubital tunnel decompression right elbow with anterior subcutaneous transposition     TOURNIQUET TIME:  90 minutes at 250 mmHg.     ESTIMATED BLOOD LOSS:  5 mL.     PACKS AND DRAINS:  None.     IMPLANTS:  None.     SPECIMENS:  None.     COMPLICATIONS:  None.     INTRAVENOUS FLUIDS:  Crystalloid.     ANESTHESIA:  Regional MAC.     INDICATIONS FOR PROCEDURE:  The patient is a 82-year-old male who   presented to the [facility] complaining of significantly symptomatic pain, weakness,  numbness and tingling in the right upper extremity.  Preoperative workup   including an EMG test revealed significant compression of the ulnar nerve at the   level of the wrist in Guyon's canal as well as carpal tunnel syndrome and cubital tunnel syndromes.  The   risks, benefits and alternatives to operative intervention were discussed with   the patient in detail.  Specific risks include but are not limited to bleeding, infection, vessel and/or nerve damage, pain, numbness, tingling, complex regional pain syndrome, compartment syndrome, failure to return to pre-injury and/or preoperative functional status, scar sensitivity, delayed healing, inability to return to work, pulley injury, tendon injury, bowstringing, partial and/or incomplete relief of symptoms, weakness, persistence of and/or worsening of symptoms, surgical failure, osteomyelitis,  amputation, loss of function, stiffness, functional debility, dysfunction, decreased  strength, need for prolonged postoperative rehabilitation, need for further surgery, deep venous thrombosis, pulmonary embolism, arthritis and death.  The patient states an understanding and wishes to proceed with surgery.   All questions were answered.  No guarantees were implied or stated.  Written informed consent was obtained.        PROCEDURE IN DETAIL:  On the date of the operative intervention, the patient was   identified in the preoperative holding area.  With their participation, the right  upper extremity was marked as the operative extremity at the operative sites.  The patient was administered regional anesthesia and taken to the   Operative Theater where the right upper extremity was placed on a hand table.    All superficial bony prominences were well padded and SCD devices were in place.    The right upper extremity was then prepped and draped in the usual sterile   fashion.  A timeout was taken and confirmed by all present to confirm the   correct patient, site, procedure and administration of preoperative antibiotics.    All were in agreement, so we proceeded.  There was a nonsterile tourniquet on the upper arm.       I 1st began with the ulnar nerve decompression at the right elbow and cubital tunnel release.    I marked out a 5cm incision just posterior to the medial   epicondyle. Esmarch was utilized to exsanguinate the operative extremity and then tourniquet was insufflated to 250 mm of mercury.  Skin incision  was then made sharply with a scalpel and dissection was carried down   Through the skin and subcutaneous tissues.  Several branches of the medial antebrachial   cutaneous nerve were identified and protected throughout the duration of the   procedure.  Dissection was carried down more deeply just posterior to the medial   epicondyle and a gentle dissection was carried down to identify the ulnar  nerve   in the proximal portion of our incision.  Dissection was then continued   proximally and the ulnar nerve was completely decompressed all the way up the   medial intermuscular septum well proximal to the   arcade of [place].  Finger dissection along the course of the nerve revealed   that there was no additional constriction at this time and it was free and well   decompressed proximally.  I then continued my decompression of the ulnar nerve   more distally in the wound.  The nerve was decompressed in a proximal to distal   direction at this time, and motor branches to the flexor carpi ulnaris were   identified and protected throughout the dissection.  The nerve was very tightly compressed particularly at Boggs ligament.  Upon releasing Boggs ligament the nerve assumed an hourglass configuration.  It was very bulbous at this site after decompression and appeared to have been severely and chronically compressed.  The FCU fascia was also   released to the mid forearm.  After   this, finger dissection was utilized to ensure complete release with no evidence   of any further constriction and I was very pleased with my decompression at   this time.  After I had ensured complete decompression with proximally and   distally, I then placed the operative elbow through a full range of motion with   flexion and extension.    The ulnar nerve was rather unstable and subluxed anterior to the medial epicondyle readily.  Decision was made to proceed with a subcutaneous anterior transposition.  The nerve was then placed anterior to the medial epicondyle in a nice linear fashion with no kinking points and its trajectory was confirmed.  I was very pleased with it.  I then created an anterior subcutaneous pocket and transposition securing the subcutaneous tissue to the medial epicondyle with 4-0 Vicryl suture.  This secured the nerve nicely and was able to be freely mobilized with proximal and distal retraction.  There was  no kinking or areas of tension..  At this time, I then irrigated the wound   copiously with sterile saline.  The subcutaneous tissues at the elbow incision were closed with 4-0 Vicryl   fascia in an inverted interrupted fashion and then the skin was closed with 4-0   nylon suture.        Having completed the cubital tunnel release and ulnar nerve transposition at the elbow I turned my attention towards the right Guyon's release and carpal tunnel releases.      My incision was marked out with a Annita shaped zigzagincision   centered at the level of Guyon's canal at the wrist, taking care to not   cross the wrist flexion crease perpendicularly.   The incision was made   overlying the Guyon's canal about the right hand and wrist for approximately 6   cm.  Dissection was carried down through the skin and subcutaneous tissues.  The   ulnar neurovascular bundle was identified proximal to the wrist flexion crease   and I began my decompression at this level.   I identified the ulnar artery and should be noted at this time that it was somewhat opacified and appeared consistent with partial thrombosis.  The deep fascia was incised and the   ulnar artery and nerve were followed more distally, at which time it was noted   that there were significant fibrous fascial tissues compressing the nerve in   Guyon's canal.  Some palmaris brevis tissue was incised and overlying   ligamentous and fascial structures were released to facilitate decompression of   the ulnar neurovascular bundle.  Dissection was carried more distally throughout   the course of the ulnar nerve, at which time it was noted that there was a   superficial cutaneous branch extending to the palmar skin, which was left   intact.  The ulnar nerve was followed to the level of the hamate hook, at which   time it was noted to bifurcate into superficial cutaneous branches as well as   the deep motor branch.  The superficial cutaneous branches were decompressed of   any  overlying soft tissue fascial structures and good decompression was obtained   well distal to the hook of the hamate as the cutaneous branches extended out   into the digital nerves.  At this time, I then followed the motor branch as it   dove deeply and curved around the hook of the hamate.  Muscle tissue was   released as well as ligamentous and soft tissue fascial structures from the hook   of the hamate, which were noted to be compressing the motor branch of the ulnar   nerve.  The motor branch was then followed more distally until it was noted to   be well decompressed well distal to the hook of the hamate with no further   constriction.  After complete decompression of the ulnar nerve in Guyon's canal I was able to identify and visualize the flexor digitorum superficialis tendon to the small finger confirming that it was a complete release.  At this time, I was pleased with my dissection and decompression   of the ulnar nerve at the right wrist.  After I was pleased with my Guyon's decompression, I then turned   my attention towards carpal tunnel release.  The median nerve   was found proximal to the wrist flexion crease and the deep fascia was incised   overlying this median nerve.  This was followed more distally and the distal   most aspect of the antebrachial forearm fascia was once again released as this   terminated into the transverse carpal ligament.  The transverse carpal ligament   was then sharply released slightly radial to the hook of the hamate for a   complete carpal tunnel release using Castrejon's cardinal line.  Care was taken not   to release the carpal tunnel distal to the Castrejon's cardinal line and the   distal fat pad was identified indicating complete release.  At this time, the median and ulnar nerve decompressions were complete.  All wounds were irrigated copiously with   sterile saline.  The tourniquet was then deflated at which point brisk capillary   refill ensued throughout the  patient's left upper extremity.  All fingertips   had brisk capillary refill of less than 3 seconds and were warm and well   perfused.  There was a palpable pulse in the ulnar artery although it did feel somewhat diminished and weak secondary to what appeared grossly as a partial thrombosis of the ulnar artery. There was no significant bleeding and hemostasis was achieved with   bipolar electrocautery.  The wound was then closed with 3-0 Vicryl in the   subcutaneous and dermal tissues followed by 4-0 nylon in the skin for skin   closure.      Sterile dressing was then applied consisting of Xeroform, 4 x 4,   gauze and a long arm posterior slab splint to the  right upper extremity.  Tourniquet was deflated and brisk capillary refill ensued throughout.  There was no significant bleeding.  The   patient was then awakened from anesthesia and returned to the Postanesthesia   Care Unit in stable condition.  There were no complications.  As attending   surgeon, I was present and performed the entire procedure.     POSTOPERATIVE PLAN FOR THE PATIENT:  The patient will be discharged home in   stable condition.  They will return in approximately 2 weeks for suture   removal and initiation of occupational therapy and reevaluation of the postoperative plan.

## 2023-01-11 NOTE — BRIEF OP NOTE
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    DISPOSITION: Home or Self Care    FINAL DIAGNOSIS:  Cubital tunnel syndrome on right    FOLLOWUP: In clinic    DISCHARGE INSTRUCTIONS:    Discharge Procedure Orders   Notify your health care provider if you experience any of the following:  temperature >100.4     Notify your health care provider if you experience any of the following:  persistent nausea and vomiting or diarrhea     Notify your health care provider if you experience any of the following:  severe uncontrolled pain     Notify your health care provider if you experience any of the following:  redness, tenderness, or signs of infection (pain, swelling, redness, odor or green/yellow discharge around incision site)     Notify your health care provider if you experience any of the following:  difficulty breathing or increased cough     Notify your health care provider if you experience any of the following:  severe persistent headache     Notify your health care provider if you experience any of the following:  worsening  rash     Notify your health care provider if you experience any of the following:  persistent dizziness, light-headedness, or visual disturbances     Notify your health care provider if you experience any of the following:  increased confusion or weakness     Leave dressing on - Keep it clean, dry, and intact until clinic visit     Notify your health care provider if you experience any of the following:  temperature >100.4     Notify your health care provider if you experience any of the following:  persistent nausea and vomiting or diarrhea     Notify your health care provider if you experience any of the following:  severe uncontrolled pain     Notify your health care provider if you experience any of the following:  redness, tenderness, or signs of infection (pain, swelling, redness, odor or green/yellow discharge around incision site)     Notify your health care provider if you experience any of the following:  difficulty breathing or increased cough     Notify your health care provider if you experience any of the following:  severe persistent headache     Notify your health care provider if you experience any of the following:  worsening rash     Notify your health care provider if you experience any of the following:  persistent dizziness, light-headedness, or visual disturbances     Notify your health care provider if you experience any of the following:  increased confusion or weakness     Leave dressing on - Keep it clean, dry, and intact until clinic visit     Weight bearing restrictions (specify):   Order Comments: NWB Operative extremity

## 2023-01-11 NOTE — ANESTHESIA PREPROCEDURE EVALUATION
01/11/2023  Pre-operative evaluation for Procedure(s) (LRB):  DECOMPRESSION, NERVE, ULNAR - right cub jolie and guyon's decompression as well as open right carpal tunnel release (Right)  RELEASE, CARPAL TUNNEL (Right)    Diego Gunter is a 82 y.o. male     Patient Active Problem List   Diagnosis    History of prostate cancer    Prediabetes    Allergic rhinitis    Anxiety    Atherosclerosis of aorta    Centrilobular emphysema    Cavitary lung disease    HILLARY (mycobacterium avium-intracellulare)    Mild cognitive impairment    Cognitive communication deficit    History of pulmonary embolism    Bradycardia    Mild pulmonary hypertension    Hypercoagulable state    Positive cardiolipin antibodies    Antiphospholipid syndrome    Acute thoracic back pain    Epidural hematoma    Impaired mobility    Hyperlipidemia    Status post laminectomy with spinal fusion    Right sided weakness    Upper extremity weakness    Cholelithiasis    Anemia    Hard of hearing    Carpal tunnel syndrome on right    Cubital tunnel syndrome on right       Review of patient's allergies indicates:  No Known Allergies    No current facility-administered medications on file prior to encounter.     Current Outpatient Medications on File Prior to Encounter   Medication Sig Dispense Refill    aspirin (ECOTRIN) 81 MG EC tablet Take 81 mg by mouth once daily.      azithromycin (ZITHROMAX) 500 MG tablet Take 500 mg by mouth once daily.      b complex vitamins capsule Take 1 capsule by mouth once daily.      nebulizer and compressor ([employer] COMPRESSOR SYSTEM) Marcy use to administer nebulized medication as directed 1 each 0    pravastatin (PRAVACHOL) 10 MG tablet ONE TABLET BY MOUTH once a day 90 tablet 0    sodium chloride 7% 7 % nebulizer solution use one vial in nebulizer twice a day      [DISCONTINUED] omega-3  fatty acids 1,000 mg Cap Take 2 g by mouth.         Past Surgical History:   Procedure Laterality Date    BRONCHOSCOPY N/A 10/15/2018    Procedure: BRONCHOSCOPY;  Surgeon: [facility] Diagnostic Provider;  Location: [address];  Service: Anesthesiology;  Laterality: N/A;    CATARACT EXTRACTION, BILATERAL      POSTERIOR CERVICAL LAMINECTOMY Bilateral 2022    Procedure: LAMINECTOMY, SPINE, CERVICAL, POSTERIOR APPROACH C3-6;  Surgeon: Carlos Miller MD;  Location: [address];  Service: Neurosurgery;  Laterality: Bilateral;    PROSTATECTOMY      REPAIR, HERNIA, INGUINAL, WITHOUT HISTORY OF PRIOR REPAIR, AGE 5 YEARS OR OLDER Right 10/10/2022    Procedure: REPAIR, HERNIA, INGUINAL, WITHOUT HISTORY OF PRIOR REPAIR, AGE 5 YEARS OR OLDER;  Surgeon: Dario Esquivel MD;  Location: [address];  Service: General;  Laterality: Right;  open right inguinal hernia repair with mesh       Social History     Socioeconomic History    Marital status:      Spouse name: Addis    Number of children: 2   Occupational History    Occupation: tool    Tobacco Use    Smoking status: Former     Packs/day: 0.25     Years: 5.00     Pack years: 1.25     Types: Cigarettes     Quit date: 10/2/1962     Years since quittin.3    Smokeless tobacco: Former    Tobacco comments:     quit age 23   Substance and Sexual Activity    Alcohol use: Not Currently    Drug use: No    Sexual activity: Yes     Partners: Female     Social Determinants of Health     Financial Resource Strain: Low Risk     Difficulty of Paying Living Expenses: Not hard at all   Food Insecurity: No Food Insecurity    Worried About Running Out of Food in the Last Year: Never true    Ran Out of Food in the Last Year: Never true   Transportation Needs: No Transportation Needs    Lack of Transportation (Medical): No    Lack of Transportation (Non-Medical): No   Physical Activity: Insufficiently Active    Days of Exercise per Week:  5 days    Minutes of Exercise per Session: 10 min   Stress: No Stress Concern Present    Feeling of Stress : Not at all   Social Connections: Moderately Isolated    Frequency of Communication with Friends and Family: More than three times a week    Frequency of Social Gatherings with Friends and Family: Once a week    Attends Mandaeism Services: Never    Active Member of Clubs or Organizations: No    Attends Club or Organization Meetings: Never    Marital Status:    Housing Stability: Low Risk     Unable to Pay for Housing in the Last Year: No    Number of Places Lived in the Last Year: 1    Unstable Housing in the Last Year: No     EKG:  Baseline Artifact Sinus bradycardia   Otherwise normal ECG   When compared with ECG of 13-MAY-2022 00:48,   No significant change was found   Confirmed by LOIS TEAGUE MD ([id number]) on 9/19/2022 4:24:40 PM     2D Echo:  No results found for this or any previous visit.        Pre-op Assessment    I have reviewed the Patient Summary Reports.     I have reviewed the Nursing Notes. I have reviewed the NPO Status.      Review of Systems  Anesthesia Hx:  Denies Family Hx of Anesthesia complications.   Denies Personal Hx of Anesthesia complications.   Cardiovascular:   Exercise tolerance: good Hypertension  Denies Angina.  Denies KO.    Pulmonary:   COPD    Renal/:   Denies Chronic Renal Disease.     Musculoskeletal:   Recent cervical epidural hematoma requiring emergent spinal decompression, and subsequent posterior fusion.   Neurological:   CVA, residual symptoms   Peripheral Neuropathy    Endocrine:   Diabetes, well controlled, type 2        Physical Exam  General: Well nourished    Airway:  Mallampati: III / II  Mouth Opening: Normal  TM Distance: Normal  Tongue: Normal  Neck ROM: Normal ROM        Anesthesia Plan  Type of Anesthesia, risks & benefits discussed:    Anesthesia Type: Gen Natural Airway, Regional  Intra-op Monitoring Plan: Standard ASA Monitors  Post  Op Pain Control Plan: multimodal analgesia and peripheral nerve block  Induction:  IV  Informed Consent: Informed consent signed with the Patient and all parties understand the risks and agree with anesthesia plan.  All questions answered. Patient consented to blood products? Yes  ASA Score: 2  Day of Surgery Review of History & Physical: H&P Update referred to the surgeon/provider.    Ready For Surgery From Anesthesia Perspective.     .

## 2023-01-16 NOTE — ANESTHESIA POSTPROCEDURE EVALUATION
Anesthesia Post Evaluation    Patient: Diego Gunter    Procedure(s) Performed: Procedure(s) (LRB):  DECOMPRESSION, NERVE, ULNAR - right cub jolie and guyon's decompression as well as open right carpal tunnel release (Right)  RELEASE, CARPAL TUNNEL (Right)    Final Anesthesia Type: general      Patient location during evaluation: PACU  Patient participation: Yes- Able to Participate  Level of consciousness: awake and alert and oriented  Post-procedure vital signs: reviewed and stable  Pain management: adequate  Airway patency: patent    PONV status at discharge: No PONV  Anesthetic complications: no      Cardiovascular status: blood pressure returned to baseline and hemodynamically stable  Respiratory status: unassisted, room air and spontaneous ventilation  Hydration status: euvolemic  Follow-up not needed.          Vitals Value Taken Time   /71 01/11/23 1145   Temp 36.2 °C (97.2 °F) 01/11/23 1030   Pulse 50 01/11/23 1151   Resp 18 01/11/23 1151   SpO2 99 % 01/11/23 1150   Vitals shown include unvalidated device data.      Event Time   Out of Recovery 11:00:00         Pain/Shena Score: No data recorded

## 2023-01-24 ENCOUNTER — OFFICE VISIT (OUTPATIENT)
Dept: ORTHOPEDICS | Facility: CLINIC | Age: 83
End: 2023-01-24
Payer: MEDICARE

## 2023-01-24 DIAGNOSIS — Z98.890 POSTOPERATIVE STATE: ICD-10-CM

## 2023-01-24 DIAGNOSIS — G56.21 CUBITAL TUNNEL SYNDROME ON RIGHT: ICD-10-CM

## 2023-01-24 DIAGNOSIS — G56.21 GUYON SYNDROME, RIGHT: ICD-10-CM

## 2023-01-24 DIAGNOSIS — G56.01 CARPAL TUNNEL SYNDROME ON RIGHT: Primary | ICD-10-CM

## 2023-01-24 PROCEDURE — 99024 PR POST-OP FOLLOW-UP VISIT: ICD-10-PCS | Mod: S$GLB,,, | Performed by: PHYSICIAN ASSISTANT

## 2023-01-24 PROCEDURE — 3288F FALL RISK ASSESSMENT DOCD: CPT | Mod: CPTII,S$GLB,, | Performed by: PHYSICIAN ASSISTANT

## 2023-01-24 PROCEDURE — 3288F PR FALLS RISK ASSESSMENT DOCUMENTED: ICD-10-PCS | Mod: CPTII,S$GLB,, | Performed by: PHYSICIAN ASSISTANT

## 2023-01-24 PROCEDURE — 1159F MED LIST DOCD IN RCRD: CPT | Mod: CPTII,S$GLB,, | Performed by: PHYSICIAN ASSISTANT

## 2023-01-24 PROCEDURE — 1159F PR MEDICATION LIST DOCUMENTED IN MEDICAL RECORD: ICD-10-PCS | Mod: CPTII,S$GLB,, | Performed by: PHYSICIAN ASSISTANT

## 2023-01-24 PROCEDURE — 1101F PR PT FALLS ASSESS DOC 0-1 FALLS W/OUT INJ PAST YR: ICD-10-PCS | Mod: CPTII,S$GLB,, | Performed by: PHYSICIAN ASSISTANT

## 2023-01-24 PROCEDURE — 1126F PR PAIN SEVERITY QUANTIFIED, NO PAIN PRESENT: ICD-10-PCS | Mod: CPTII,S$GLB,, | Performed by: PHYSICIAN ASSISTANT

## 2023-01-24 PROCEDURE — 99024 POSTOP FOLLOW-UP VISIT: CPT | Mod: S$GLB,,, | Performed by: PHYSICIAN ASSISTANT

## 2023-01-24 PROCEDURE — 99999 PR PBB SHADOW E&M-EST. PATIENT-LVL III: ICD-10-PCS | Mod: PBBFAC,,, | Performed by: PHYSICIAN ASSISTANT

## 2023-01-24 PROCEDURE — 1101F PT FALLS ASSESS-DOCD LE1/YR: CPT | Mod: CPTII,S$GLB,, | Performed by: PHYSICIAN ASSISTANT

## 2023-01-24 PROCEDURE — 1126F AMNT PAIN NOTED NONE PRSNT: CPT | Mod: CPTII,S$GLB,, | Performed by: PHYSICIAN ASSISTANT

## 2023-01-24 PROCEDURE — 1157F ADVNC CARE PLAN IN RCRD: CPT | Mod: CPTII,S$GLB,, | Performed by: PHYSICIAN ASSISTANT

## 2023-01-24 PROCEDURE — 99999 PR PBB SHADOW E&M-EST. PATIENT-LVL III: CPT | Mod: PBBFAC,,, | Performed by: PHYSICIAN ASSISTANT

## 2023-01-24 PROCEDURE — 1157F PR ADVANCE CARE PLAN OR EQUIV PRESENT IN MEDICAL RECORD: ICD-10-PCS | Mod: CPTII,S$GLB,, | Performed by: PHYSICIAN ASSISTANT

## 2023-01-24 NOTE — PROGRESS NOTES
Dr. Lazo is the supervising physician for this encounter/patient    Diego Gunter presents for post-operative evaluation.  The patient is now 2 weeks s/p Right wrist Guyons decompression, carpal tunnel release and cubital tunnel release at the elbow with Dr. Lazo on 1/11/23.  Overall the patient reports doing well.  He reports 0/10 pain, denies any f/c/s and tells me all of his numbness has resolved. He is not taking any medications for this. He is not scheduled with OT.    PE:    AA&O x 4.  NAD  HEENT:  NCAT, sclera nonicteric  Lungs:  Respirations are equal and unlabored.  CV:  2+ bilateral upper and lower extremity pulses.  MSK: The wounds are healing well with no signs of erythema or warmth.  There is no drainage.  No clinical signs or symptoms of infection are present.  Near full hand motion present, wrist motion present without pain. Elbow extension decreased by about 45 degrees, full flexion. SILT. DNVI.    A/P: Status post above, doing well  1) OT ordered for postop rehab  2) All sutures removed today. Wound care and signs of infection discussed.  3) F/U 4 weeks  4) Call with any questions/concerns in the interim      Jamie Russo-DiGeorge PA-C

## 2023-01-27 ENCOUNTER — CLINICAL SUPPORT (OUTPATIENT)
Dept: REHABILITATION | Facility: HOSPITAL | Age: 83
End: 2023-01-27
Payer: MEDICARE

## 2023-01-27 DIAGNOSIS — R29.898 RIGHT ARM WEAKNESS: Primary | ICD-10-CM

## 2023-01-27 DIAGNOSIS — Z98.890 POSTOPERATIVE STATE: ICD-10-CM

## 2023-01-27 DIAGNOSIS — G56.21 GUYON SYNDROME, RIGHT: ICD-10-CM

## 2023-01-27 DIAGNOSIS — G56.21 CUBITAL TUNNEL SYNDROME ON RIGHT: ICD-10-CM

## 2023-01-27 DIAGNOSIS — R68.89 DECREASED FUNCTIONAL ACTIVITY TOLERANCE: ICD-10-CM

## 2023-01-27 DIAGNOSIS — M25.621 STIFFNESS OF RIGHT UPPER ARM JOINT: ICD-10-CM

## 2023-01-27 PROCEDURE — 97166 OT EVAL MOD COMPLEX 45 MIN: CPT

## 2023-01-27 PROCEDURE — 97110 THERAPEUTIC EXERCISES: CPT

## 2023-01-27 NOTE — PLAN OF CARE
[facility] Occupational Therapy  Initial Evaluation     Date: 1/27/2023  Patient: Diego Gunter  Chart Number: [id number]  Referring Physician: Russo-Digeorge, Jamie L*  Therapy Diagnosis:   1. Right arm weakness        2. Guyon syndrome, right  Ambulatory referral/consult to Physical/Occupational Therapy      3. Cubital tunnel syndrome on right  Ambulatory referral/consult to Physical/Occupational Therapy      4. Postoperative state  Ambulatory referral/consult to Physical/Occupational Therapy      5. Stiffness of right upper arm joint        6. Decreased functional activity tolerance            Physician Orders: Eval and treat  Medical Diagnosis:   G56.21 (ICD-10-CM) - Guyon syndrome, right   G56.21 (ICD-10-CM) - Cubital tunnel syndrome on right   Z98.890 (ICD-10-CM) - Postoperative state     Evaluation Date: 1/27/2023  Plan of Care Certification Date: 04/21/23  Authorization Period: 1/27/23 - 2/27/23  Surgery Date and Procedure: 01/11/23  Right wrist Guyons decompression, carpal tunnel release and cubital tunnel release at the elbow   Date of Return to MD: 02/28/23  FOTO Completion: NT    Visit #: 1 of 1  Time In: 12:00pm  Time Out: 1:00pm    Total Billable Time: 60 min    Precautions: Standard and no weightbearing/strengthening    Subjective     History of Current Condition: Diego Gunter is a 82 y.o. RHD male who presents s/p CTR, cubital tunnel release and Guyon's release on 1/11/23. He is accompanied by his wife. He has a PMH of a stroke in 04/2022 and C5/C6 radiculopathy resulting in weakness of RUE for which he attended OT/PT services at this facility in 2022. Due to ongoing painful numbness/tingling and swelling throughout the RUE, he underwent EMG indicating median neuropathy about the wrist and ulnar neuropathy about wrist and elbow for which he underwent the decompression surgery. He now reports improvement in numbness/tingling and upper extremity pain but continues to have  "limited functional use of RUE secondary to edema, limited ROM, and limited strength. He presents with a prefab wrist cock up which he was instructed to wear by MD/PA during ambulation and when in public.      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   Previous Therapy: OT/PT at this facility in 08/22 - 10/22 due to RUE weakness s/p laminectomy    Patient's Goals for Therapy: "to get better and be able to go to the gym"    Pain:  Functional Pain Scale Rating 0-10:   3/10 on average  0/10 at best  6/10 at worst  0/10 current   Location: wrist  Description: throbbing  Aggravating Factors: intermittent, random pain at wrist  Easing Factors: gentle ROM    Occupation:  Retired    Functional Limitations/Social History:    Prior Level of Function: Reports independent with all ADLs with DME, requires assistance with IADLs, cutting meat from wife and yardwork from grandson, utilized nondominant LUE at times for tasks such as feeding  Current Level of Function: requires some assistance from spouse for ADLs such as bathing, transfers    Home/Living environment : lives with their spouse, grandson lives next door  Home Access: shower chair, grab bars  DME: rolling walker and standard walker - recently stopped using the walker about a month ago but patient requires supervision during ambulation and navigating steps  Leisure: watching football, casino, crossword puzzles  Driving: No, wife drives him to appts      Past Medical History/Physical Systems Review:   Diego Gunter  has a past medical history of Acute encephalopathy, Acute ischemic left MCA " stroke, Acute ischemic left MCA stroke, Anemia, Antiphospholipid syndrome, Centrilobular emphysema, COPD (chronic obstructive pulmonary disease), Dysphagia, Dysphagia, oropharyngeal, Functional belching disorder, Hard of hearing, colonic polyps, colonic polyps, Hyperlipidemia associated with type 2 diabetes mellitus, Hyperlipidemia LDL goal <100, Hypernatremia, Hypertension associated with type 2 diabetes mellitus, Hypertension associated with type 2 diabetes mellitus, Hypotension, Intermittent confusion, Overweight(278.02), Prostate cancer, Pulmonary embolism, Reducible right inguinal hernia, Right hemiparesis, Type II or unspecified type diabetes mellitus without mention of complication, not stated as uncontrolled, and Urinary retention.    Diego Gunter  has a past surgical history that includes Prostatectomy; Cataract extraction, bilateral (2014); Bronchoscopy (N/A, 10/15/2018); Posterior cervical laminectomy (Bilateral, 4/28/2022); repair, hernia, inguinal, without history of prior repair, age 5 years or older (Right, 10/10/2022); decompression, nerve, ulnar (Right, 1/11/2023); and Carpal tunnel release (Right, 1/11/2023).    Diego has a current medication list which includes the following prescription(s): acetaminophen, aspirin, azithromycin, b complex vitamins, ethambutol, ibuprofen, nebulizer and compressor, pravastatin, sodium chloride 3%, sodium chloride 7%, tramadol, and [DISCONTINUED] omega-3 fatty acids.    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 Patient reports no numbness/tingling    Wound Assessment:   Incisions fully  closed, no signs of infection, thickened with limited mobility  Size: incision length about 2 inches at elbow, and 1in at volar wrist  Location: Right medial elbow, volar wrist    Edema: Circumferential measurements: In centimters     Left  1/27/2023 Right   1/27/2023   MPs 20.8 cm 22.5 cm   Proximal Palmar Crease    NT  NT   Proximal Wrist Crease 16.6 cm 17.2 cm      Left   1/27/2023 Right   1/27/2023   3 Inches distal to elbow crease 23.5 cm 23 cm   Elbow crease 24.7 cm 27.4 cm   3 Inches proximal to elbow crease NT NT       Range of Motion:     Shoulder AROM: Measured in degrees.   Left  1/27/2023 Right  1/27/2023   Extension/Flexion WFL ~70 flexion   Abduction WFL ~70   IR/ER WFL ~60 ER       Elbow and Wrist ROM. Measured in degrees.   Left  1/27/2023 Right  1/27/2023        Elbow Ext/Flex WNL -45/135  -35 PROM   Supination/Pronation 75/75 75/70   Wrist Ext/Flex 65/70 55/35   Wrist RD/UD 15/35 15/20       Hand ROM. Measured in degrees.    Pt unable to achieve full extension of Right hand, secondary to PIP contractures of the RF/SF, and not able to touch DPC with digits but demonstrates WFL for flexion of all digits   Left  1/27/2023 Right  1/27/2023             Index: MP  WNL WFL              PIP                    DIP          Dist to DPC                DELEON          Long:  MP                PIP                DIP           Dist to DPC                DELEON          Ring:   MP                PIP                DIP           Dist to DPC                DELEON          Small:  MP                 PIP                 DIP           Dist to DPC                DELEON          Thumb: MP                  IP         Rad ADD/ABD         Pal ADD/ABD            Opposition          Dist to DPC 0 cm     4.5 cm        Strength: (FLORIDALMA Dynamometer in lbs)  Not tested at this date due to precautions     Left Right    1/27/2023 1/27/2023   Rung II     Average         Pinch Strength (Measured in lbs): Not tested at this date due to  precautions     Left Right    1/27/2023 1/27/2023   Key Pinch     3pt Pinch     2pt Pinch         Limitation/Restriction for FOTO Initial Survey    Therapist reviewed FOTO scores for Dieog Gunter on 1/27/2023.   FOTO documents entered into [employer] - see Media section.    Limitation Score: NT         Treatment     Treatment Time In: 12:00pm  Treatment Time Out: 1:00pm  Total Treatment time separate from Evaluation time:15min    Diego performed therapeutic exercises for 15 minutes including:  -AROM elbow flex/ext, sup/pro, wrist ext/flex  -Provided and reviewed HEP to include AROM elbow, forearm, wrist and supine elbow extension stretch  -Patient education on scar massage to be applied to all incisions for 5 min each, 3x/day      Patient Education and Home Exercises     Education provided:   - Role of OT, HEP, and POC    Written Home Exercises Provided: yes.  Exercises were reviewed and Diego was able to demonstrate them prior to the end of the session.  Diego demonstrated fair understanding of the education provided, requiring verbal cueing from wife at times for technique of HEP exercises. See EMR under Patient Instructions for exercises provided during therapy sessions.     Pt was advised to perform these exercises free of pain, and to stop performing them if pain occurs.    Patient/Family Education: role of OT, goals for OT, scheduling/cancellations - pt verbalized understanding. Discussed insurance limitations with patient.      Assessment     Diego Gunter is a 82 y.o. male referred to outpatient occupational therapy and presents with the following therapy deficits: Decreased ROM, Decreased muscle strength, Decreased functional hand use, Edema, Joint Stiffness, Scar Adhesions, and Diminished/Impaired Coordination and demonstrates limitations as described in the chart below. Following medical record review it is determined that pt will benefit from occupational therapy services in order to maximize pain  free and/or functional use of right elbow, wrist and hand. The following goals were discussed with the patient and patient is in agreement with them as to be addressed in the treatment plan. The patient's rehab potential is Fair.     Anticipated barriers to occupational therapy: PLOF, comorbidities  Pt has no cultural, educational or language barriers to learning provided.    Profile and History Assessment of Occupational Performance Level of Clinical Decision Making Complexity Score   Occupational Profile:   Diego Gunter is a 82 y.o. male who is retired Diego Gunter has difficulty with  ADLs and IADLs as listed previously, which  affecting his/her daily functional abilities.      Comorbidities:    has a past medical history of Acute encephalopathy, Acute ischemic left MCA stroke, Acute ischemic left MCA stroke, Anemia, Antiphospholipid syndrome, Centrilobular emphysema, COPD (chronic obstructive pulmonary disease), Dysphagia, Dysphagia, oropharyngeal, Functional belching disorder, Hard of hearing, colonic polyps, colonic polyps, Hyperlipidemia associated with type 2 diabetes mellitus, Hyperlipidemia LDL goal <100, Hypernatremia, Hypertension associated with type 2 diabetes mellitus, Hypertension associated with type 2 diabetes mellitus, Hypotension, Intermittent confusion, Overweight(278.02), Prostate cancer, Pulmonary embolism, Reducible right inguinal hernia, Right hemiparesis, Type II or unspecified type diabetes mellitus without mention of complication, not stated as uncontrolled, and Urinary retention.    Medical and Therapy History Review:   Expanded               Performance Deficits    Physical:  Joint Mobility  Muscle Power/Strength  Skin Integrity/Scar Formation  Edema   Strength  Pinch Strength  Pain    Cognitive:  Memory    Psychosocial:    Habits  Routines  Rituals     Clinical Decision Making:  moderate    Assessment Process:  Problem-Focused Assessments    Modification/Need for  Assistance:  Minimal-Moderate Modifications/Assistance    Intervention Selection:  Multiple Treatment Options       moderate  Based on PMHX, co morbidities , data from assessments and functional level of assistance required with task and clinical presentation directly impacting function.         Goals:    LTG's (8 weeks):  1)   Demonstrate improved elbow active extension to -25 degrees to increase functional hand use for ADLs.  2)   Demonstrate 20 lbs of right  strength to facilitate independence with ADLs  3)   Decrease complaints of pain to  2 out of 10 at worst to increase functional hand use for ADL/work/leisure activities.  4)   Patient to score at 40% or less on FOTO to demonstrate improved perception of functional RUE use.  5) Demonstrate no greater than .5 cm difference in edema of Right MPs and elbow crease compared to unaffected side.    STG's (4 weeks)  1)   Patient to be IND with HEP and modalities for pain/edema managment.  2)   Demonstrate -35 degrees of active elbow extension to increase functional hand use for ADLs/work/leisure activities.  3) Demonstrate no greater than 1.0 cm difference in edema of Right MPs and elbow crease compared to unaffected side.  4)   Decrease complaints of pain to  4 out of 10 at worst to increase functional hand use for ADL/work/leisure activities.      Plan     Pt to be treated by Occupational Therapy 2-3 times per week for 12 weeks during the certification period from 1/27/2023 to 04/21/23 to achieve the established goals.     Treatment to include: Paraffin, Fluidotherapy, Manual therapy/joint mobilizations, Modalities for pain management, US 3 mhz, Therapeutic exercises/activities., Strengthening, Edema Control, Scar Management, and Energy Conservation, as well as any other treatments deemed necessary based on the patient's needs or progress.       GISELA Miranda

## 2023-01-27 NOTE — PATIENT INSTRUCTIONS
[facility], OCCUPATIONAL THERAPY  HOME EXERCISE PROGRAM     Complete the following massages for 10min each, 3-4x/day.                                              Complete the following exercises with 10 repetitions each, 5-6x/day.     AROM: Elbow Flexion / Extension          Bend and straighten elbow in 3 different positions: thumb up, palm up, palm down.        Complete the following exercises with 10 repetitions each, 5-6x/day.     AROM: Supination / Pronation   With your elbow by your side, turn your palm up then turn your palm down.     AROM: Wrist Flexion / Extension               Bend your wrist forward and back as far as possible.          AROM: Wrist Flexion / Extension  Make a fist, then bend your wrist forward then back as far as possible.             Copyright © [employer]. All rights reserved.     Therapist: JOEL Miranda/KVNG

## 2023-01-30 ENCOUNTER — CLINICAL SUPPORT (OUTPATIENT)
Dept: REHABILITATION | Facility: HOSPITAL | Age: 83
End: 2023-01-30
Payer: MEDICARE

## 2023-01-30 DIAGNOSIS — M25.621 STIFFNESS OF RIGHT UPPER ARM JOINT: Primary | ICD-10-CM

## 2023-01-30 DIAGNOSIS — R29.898 RIGHT ARM WEAKNESS: ICD-10-CM

## 2023-01-30 DIAGNOSIS — R68.89 DECREASED FUNCTIONAL ACTIVITY TOLERANCE: ICD-10-CM

## 2023-01-30 PROCEDURE — 97140 MANUAL THERAPY 1/> REGIONS: CPT

## 2023-01-30 PROCEDURE — 97110 THERAPEUTIC EXERCISES: CPT

## 2023-01-30 NOTE — PROGRESS NOTES
"  Occupational Therapy Daily Treatment Note     Name: Diego Gunter  [facility] Number: [id number]    Therapy Diagnosis:   Encounter Diagnoses   Name Primary?    Stiffness of right upper arm joint Yes    Right arm weakness     Decreased functional activity tolerance      Physician: Russo-Digeorge, Jamie L*    Visit Date: 1/30/2023    Physician Orders: Eval and treat  Medical Diagnosis:   G56.21 (ICD-10-CM) - Guyon syndrome, right   G56.21 (ICD-10-CM) - Cubital tunnel syndrome on right   Z98.890 (ICD-10-CM) - Postoperative state      Evaluation Date: 1/27/2023  Plan of Care Certification Date: 04/21/23  Authorization Period: 1/27/23 - 2/24/23  Surgery Date and Procedure: 01/11/23  Right wrist Guyons decompression, carpal tunnel release and cubital tunnel release at the elbow   Date of Return to MD: 02/28/23  FOTO Completion: 1/30/23    Visit #: 1 / 11    Time In:11:07  Time Out: 12:00  Total Billable Time: 43 minutes    Precautions:  Standard and Weightbearing      Subjective     Pt reports: "I was eating spaghetti yesterday and using my right hand. It felt more normal."  he was compliant with home exercise program given last session.   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 Patient reports no numbness/tingling     Wound Assessment:   Incisions fully closed, no signs of infection  Size: incision length about 2 inches at elbow, and 1 inch at volar wrist  Location: Right medial elbow, volar wrist    Scar about elbow is supple, able to  and roll  Scar about volar wrist is thickened, limited mobility     Edema: " Circumferential measurements: In centimters       Left  1/27/2023 Right   1/27/2023   MPs 20.8 cm 22.5 cm   Proximal Palmar Crease    NT  NT   Proximal Wrist Crease 16.6 cm 17.2 cm        Left   1/27/2023 Right   1/27/2023   3 Inches distal to elbow crease 23.5 cm 23 cm   Elbow crease 24.7 cm 27.4 cm   3 Inches proximal to elbow crease NT NT         Range of Motion:      Shoulder AROM: Measured in degrees.    Left  1/27/2023 Right  1/27/2023   Extension/Flexion WFL ~70 flexion   Abduction WFL ~70   IR/ER WFL ~60 ER         Elbow and Wrist ROM. Measured in degrees.    Left  1/27/2023 Right  1/27/2023           Elbow Ext/Flex WNL -45/135  -35 PROM   Supination/Pronation 75/75 75/70   Wrist Ext/Flex 65/70 55/35   Wrist RD/UD 15/35 15/20         Hand ROM. Measured in degrees.     Pt unable to achieve full extension of Right hand, secondary to PIP contractures of the RF/SF, and not able to touch DPC with digits but demonstrates WFL for flexion of all digits    Left  1/27/2023 Right  1/27/2023                   Index: MP  WNL WFL              PIP                      DIP            Dist to DPC                  DELEON               Long:  MP                  PIP                  DIP             Dist to DPC                  DELEON               Ring:   MP                  PIP                  DIP             Dist to DPC                  DELEON               Small:  MP                   PIP                   DIP             Dist to DPC                  DELEON               Thumb: MP                    IP           Rad ADD/ABD           Pal ADD/ABD              Opposition            Dist to DPC 0 cm     4.5 cm          Limitation/Restriction for FOTO Initial Survey     Therapist reviewed FOTO scores for Diego Gunter on 1/27/2023.   FOTO documents entered into [employer] - see Media section.     Limitation Score: 50              Treatment   Diego received the following supervised modalities after being cleared for contraindications for 10  minutes: Hot pack applied to the Right Elbow and biceps area while supine on plinth to increase blood flow and circulation, and tissue extensibility prior to therex.    Diego received the following manual therapy techniques for 15 minutes while supine on plinth:   - STM about elbow and wrist incision, biceps muscle and forearm musculature  - Gentle sustained stretching into elbow extension  - Composite wrist flex/ext stretch    Diego performed therapeutic exercises for 28 minutes including:  - AROM elbow flex/ext in pro and sup position, sup/pro, wrist ext/flex (x10 reps each)  - AAROM with wrist wheel to facilitate shoulder and elbow ROM (x2 min)  - Towel slides on table top to facilitate elbow extension (x2 min)  - Gentle hand squeezes with blue sponge x 3 min - instructed to add to HEP, 4-5x/day to facilitate tendon gliding and edema management  - Scapular retraction while seated (2 sets of 10) to facilitate proximal stability - required verbal and tactile cueing    Home Exercises and Education Provided     Education provided:   - Reviewed HEP to include AROM elbow, forearm, wrist and supine elbow extension stretch  - Progress towards goals     Written Home Exercises Provided: Patient instructed to cont prior HEP.  Exercises were reviewed and Diego was able to demonstrate them prior to the end of the session.  Diego demonstrated good  understanding of the HEP provided.   .   See EMR under Patient Instructions for exercises provided prior visit.        Assessment     Pt tolerated treatment session well, reporting good tolerance for elbow extension stretching at home as well. He tolerated addition of sponge squeezes for tendon gliding and edema management and was instructed to continue at home. Pt was unable to achieve scapular retraction while seated, and instead substituting with scapular elevation. He will benefit from scapular mobilization/gliding and assisted scapular retraction in supine next session to  facilitate proximal stability and thus improve distal mobility/functional use of RUE for ADLs.    Diego is progressing well towards his goals and there are no updates to goals at this time. Pt prognosis is Fair.     Pt will continue to benefit from skilled outpatient occupational therapy to address the deficits listed in the problem list on initial evaluation provide pt/family education and to maximize pt's level of independence in the home and community environment.     Anticipated barriers to occupational therapy: PLOF, PMH/comorbidities    Pt's spiritual, cultural and educational needs considered and pt agreeable to plan of care and goals.    Goals:   LTG's (8 weeks):  1)   Demonstrate improved elbow active extension to -25 degrees to increase functional hand use for ADLs.  2)   Demonstrate 20 lbs of right  strength to facilitate independence with ADLs  3)   Decrease complaints of pain to  2 out of 10 at worst to increase functional hand use for ADL/work/leisure activities.  4)   Patient to score at 40% or less on FOTO to demonstrate improved perception of functional RUE use.  5) Demonstrate no greater than .5 cm difference in edema of Right MPs and elbow crease compared to unaffected side.     STG's (4 weeks)  1)   Patient to be IND with HEP and modalities for pain/edema managment.  2)   Demonstrate -35 degrees of active elbow extension to increase functional hand use for ADLs/work/leisure activities.  3) Demonstrate no greater than 1.0 cm difference in edema of Right MPs and elbow crease compared to unaffected side.  4)   Decrease complaints of pain to  4 out of 10 at worst to increase functional hand use for ADL/work/leisure activities.       Plan   Continue to progress as tolerated.  Updates/Grading for next session: Scapular mobilization and ROM, elbow and digit ROM      GISELA Miranda

## 2023-01-31 NOTE — PROGRESS NOTES
Occupational Therapy Daily Treatment Note     Name: Diego TIRADO [facility] Number: [id number]    Therapy Diagnosis:   Encounter Diagnoses   Name Primary?    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, right   G56.21 (ICD-10-CM) - Cubital tunnel syndrome on right   Z98.890 (ICD-10-CM) - Postoperative state      Evaluation Date: 1/27/2023  Plan of Care Certification Date: 04/21/23  Authorization Period: 1/27/23 - 2/24/23  Surgery Date and Procedure: 01/11/23  Right wrist Guyons decompression, carpal tunnel release and cubital tunnel release at the elbow   Date of Return to MD: 02/28/23  FOTO Completion: 1/30/23    Visit #: 2 / 11    Time In: 11:07 am  Time Out: 12:00 pm  Total Billable Time: 53 minutes    Precautions:  Standard and Weightbearing      Subjective     Pt reports: He reports he's been performing scar massage to both wrist scar and elbow scar.   he was compliant with home exercise program given last session.   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 Patient reports no numbness/tingling     Wound Assessment:   Incisions fully closed, no signs of infection  Size: incision length about 2 inches at elbow, and 1 inch at volar wrist  Location: Right medial elbow, volar wrist    Scar about elbow is supple, able to  and roll  Scar about volar wrist is thickened, limited mobility     Edema: Circumferential  measurements: In centimters       Left  1/27/2023 Right   1/27/2023   MPs 20.8 cm 22.5 cm   Proximal Palmar Crease    NT  NT   Proximal Wrist Crease 16.6 cm 17.2 cm        Left   1/27/2023 Right   1/27/2023   3 Inches distal to elbow crease 23.5 cm 23 cm   Elbow crease 24.7 cm 27.4 cm   3 Inches proximal to elbow crease NT NT         Range of Motion:      Shoulder AROM: Measured in degrees.    Left  1/27/2023 Right  1/27/2023   Extension/Flexion WFL ~70 flexion   Abduction WFL ~70   IR/ER WFL ~60 ER         Elbow and Wrist ROM. Measured in degrees.    Left  1/27/2023 Right  1/27/2023 Right   2/1/23            Elbow Ext/Flex WNL -45/135  -35 PROM Extension:  -30 AROM  -20 PROM   Supination/Pronation 75/75 75/70    Wrist Ext/Flex 65/70 55/35    Wrist RD/UD 15/35 15/20          Hand ROM. Measured in degrees.     Pt unable to achieve full extension of Right hand, secondary to PIP contractures of the RF/SF, and not able to touch DPC with digits but demonstrates WFL for flexion of all digits    Left  1/27/2023 Right  1/27/2023                   Index: MP  WNL WFL              PIP                      DIP            Dist to DPC                  DELEON               Long:  MP                  PIP                  DIP             Dist to DPC                  DELEON               Ring:   MP                  PIP                  DIP             Dist to DPC                  DELEON               Small:  MP                   PIP                   DIP             Dist to DPC                  DELEON               Thumb: MP                    IP           Rad ADD/ABD           Pal ADD/ABD              Opposition            Dist to DPC 0 cm     4.5 cm          Limitation/Restriction for FOTO Initial Survey     Therapist reviewed FOTO scores for Diego Gunter on 1/27/2023.   FOTO documents entered into [employer] - see Media section.     Limitation Score: 50              Treatment   Diego received the following supervised modalities after being  cleared for contraindications for 10 minutes: Hot pack applied to the Right Elbow and biceps with 1.5 weight wrapped around wrist area while supine on plinth to increase blood flow and circulation, and tissue extensibility prior to therex.    Diego received the following manual therapy techniques for 20 minutes while supine on plinth:   - STM about elbow and wrist incision, biceps muscle and forearm musculature (STM to wrist performed with pt sitting and elbow over wedge and forearm supination for a low load elbow extension stretch)  - Gentle sustained stretching into elbow extension  - Composite wrist flex/ext stretch    Diego performed therapeutic exercises for 23 minutes including:  - AROM elbow flex/ext in pro and sup position, sup/pro, wrist ext/flex (with shoulder in ~60 degrees flexion and left hand supporting posterior arm proximal to elbow) (x10 reps each)  - AAROM with wrist wheel to facilitate shoulder and elbow ROM (x2 min)  - Towel slides on table top to facilitate elbow extension (x2 min)  - Gentle hand squeezes with blue sponge x 3 min - instructed to add to HEP, 4-5x/day to facilitate tendon gliding and edema management  - Scapular retraction while seated (2 sets of 10) to facilitate proximal stability - required verbal and tactile cueing    Home Exercises and Education Provided     Education provided:   - Reviewed HEP to include AROM elbow, forearm, wrist and supine elbow extension stretch  - Progress towards goals     Written Home Exercises Provided: Patient instructed to cont prior HEP.  Exercises were reviewed and Diego was able to demonstrate them prior to the end of the session.  Diego demonstrated good  understanding of the HEP provided.   .   See EMR under Patient Instructions for exercises provided prior visit.        Assessment     Diego tolerated OT session well with mild pain reports bringing right elbow into flexion after low load, prolonged elbow extension stretch. Re-assessed  right elbow range of motion and he demonstrates increased active extension (+5 from eval) and increased passive extension (+15 from eval). He performed therapy exercises well with tactile cueing to maintain upright static trunk position during towel slides. Both wrist and elbow incisions are fully healed and scars are laying flat. OT, pt, and pt's wife reviewed low load elbow extension stretch to ensure good carryover to perform at home.     Diego is progressing well towards his goals and there are no updates to goals at this time. Pt prognosis is Fair.     Pt will continue to benefit from skilled outpatient occupational therapy to address the deficits listed in the problem list on initial evaluation provide pt/family education and to maximize pt's level of independence in the home and community environment.     Anticipated barriers to occupational therapy: PLOF, PMH/comorbidities    Pt's spiritual, cultural and educational needs considered and pt agreeable to plan of care and goals.    Goals:   LTG's (8 weeks):  1)   Demonstrate improved elbow active extension to -25 degrees to increase functional hand use for ADLs.  - Ongoing   2)   Demonstrate 20 lbs of right  strength to facilitate independence with activities of daily living  - Ongoing   3)   Decrease complaints of pain to  2 out of 10 at worst to increase functional hand use for ADL/work/leisure activities.  - Ongoing   4)   Patient to score at 40% or less on FOTO to demonstrate improved perception of functional RUE use.  - Ongoing   5) Demonstrate no greater than .5 cm difference in edema of Right MPs and elbow crease compared to unaffected side.  - Ongoing      STG's (4 weeks)  1)   Patient to be IND with HEP and modalities for pain/edema managment.  - Ongoing   2)   Demonstrate -35 degrees of active elbow extension to increase functional hand use for ADLs/work/leisure activities.  Met 2/1  3) Demonstrate no greater than 1.0 cm difference in edema of  Right MPs and elbow crease compared to unaffected side.  - Ongoing   4)   Decrease complaints of pain to  4 out of 10 at worst to increase functional hand use for ADL/work/leisure activities.  - Ongoing        Plan   Continue to progress as tolerated.  Updates/Grading for next session: Scapular mobilization and ROM, elbow and digit ROM      Svitlana Garcia, OTR/L

## 2023-02-01 ENCOUNTER — CLINICAL SUPPORT (OUTPATIENT)
Dept: ENDOSCOPY | Facility: HOSPITAL | Age: 83
End: 2023-02-01
Attending: INTERNAL MEDICINE
Payer: MEDICARE

## 2023-02-01 ENCOUNTER — CLINICAL SUPPORT (OUTPATIENT)
Dept: REHABILITATION | Facility: HOSPITAL | Age: 83
End: 2023-02-01
Payer: MEDICARE

## 2023-02-01 DIAGNOSIS — D64.9 ANEMIA, UNSPECIFIED TYPE: ICD-10-CM

## 2023-02-01 DIAGNOSIS — R68.89 DECREASED FUNCTIONAL ACTIVITY TOLERANCE: ICD-10-CM

## 2023-02-01 DIAGNOSIS — M25.621 STIFFNESS OF RIGHT UPPER ARM JOINT: Primary | ICD-10-CM

## 2023-02-01 DIAGNOSIS — R29.898 RIGHT ARM WEAKNESS: ICD-10-CM

## 2023-02-01 PROCEDURE — 97140 MANUAL THERAPY 1/> REGIONS: CPT

## 2023-02-01 PROCEDURE — 97110 THERAPEUTIC EXERCISES: CPT

## 2023-02-02 ENCOUNTER — CLINICAL SUPPORT (OUTPATIENT)
Dept: REHABILITATION | Facility: HOSPITAL | Age: 83
End: 2023-02-02
Payer: MEDICARE

## 2023-02-02 DIAGNOSIS — R29.898 RIGHT ARM WEAKNESS: ICD-10-CM

## 2023-02-02 DIAGNOSIS — M25.621 STIFFNESS OF RIGHT UPPER ARM JOINT: Primary | ICD-10-CM

## 2023-02-02 DIAGNOSIS — R68.89 DECREASED FUNCTIONAL ACTIVITY TOLERANCE: ICD-10-CM

## 2023-02-02 PROCEDURE — 97110 THERAPEUTIC EXERCISES: CPT

## 2023-02-02 PROCEDURE — 97140 MANUAL THERAPY 1/> REGIONS: CPT

## 2023-02-02 NOTE — PROGRESS NOTES
Occupational Therapy Daily Treatment Note     Name: Diego Gunter  [facility] Number: [id number]    Therapy Diagnosis:   Encounter Diagnoses   Name Primary?    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, right   G56.21 (ICD-10-CM) - Cubital tunnel syndrome on right   Z98.890 (ICD-10-CM) - Postoperative state      Evaluation Date: 1/27/2023  Plan of Care Certification Date: 04/21/23  Authorization Period: 1/27/23 - 2/24/23  Surgery Date and Procedure: 01/11/23  Right wrist Guyons decompression, carpal tunnel release and cubital tunnel release at the elbow   Date of Return to MD: 02/28/23  FOTO Completion: 1/30/23    Visit #: 3 / 11    Time In: 10:03 am  Time Out: 11:00 pm  Total Billable Time: 57 minutes    Precautions:  Standard and Weightbearing      Subjective     Pt reports: He reports he felt pain at the elbow this morning when he woke up (5/10) but it subsided on it's own without pain medication after bending and straightening it.  he was compliant with home exercise program given last session.   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 Patient reports no numbness/tingling     Wound Assessment:   Incisions fully closed, no signs of infection  Size: incision length about 2 inches at elbow, and 1 inch at volar wrist  Location: Right medial elbow, volar wrist    Scar about elbow is supple, able  to  and roll  Scar about volar wrist is thickened, limited mobility     Edema: Circumferential measurements: In centimters       Left  1/27/2023 Right   1/27/2023   MPs 20.8 cm 22.5 cm   Proximal Palmar Crease    NT  NT   Proximal Wrist Crease 16.6 cm 17.2 cm        Left   1/27/2023 Right   1/27/2023   3 Inches distal to elbow crease 23.5 cm 23 cm   Elbow crease 24.7 cm 27.4 cm   3 Inches proximal to elbow crease NT NT         Range of Motion:      Shoulder AROM: Measured in degrees.    Left  1/27/2023 Right  1/27/2023   Extension/Flexion WFL ~70 flexion   Abduction WFL ~70   IR/ER WFL ~60 ER         Elbow and Wrist ROM. Measured in degrees.    Left  1/27/2023 Right  1/27/2023 Right   2/1/23 Right   2/2             Elbow Ext/Flex WNL -45/135  -35 PROM Extension:    -30 AROM  -20 PROM Extension:    Pre tx: -30  After heat, PROM: -15     Supination/Pronation 75/75 75/70     Wrist Ext/Flex 65/70 55/35     Wrist RD/UD 15/35 15/20           Hand ROM. Measured in degrees.     Pt unable to achieve full extension of Right hand, secondary to PIP contractures of the RF/SF, and not able to touch DPC with digits but demonstrates WFL for flexion of all digits    Left  1/27/2023 Right  1/27/2023                   Index: MP  WNL WFL              PIP                      DIP            Dist to DPC                  DELEON               Long:  MP                  PIP                  DIP             Dist to DPC                  DELEON               Ring:   MP                  PIP                  DIP             Dist to DPC                  DELEON               Small:  MP                   PIP                   DIP             Dist to DPC                  DELEON               Thumb: MP                    IP           Rad ADD/ABD           Pal ADD/ABD              Opposition            Dist to DPC 0 cm     4.5 cm          Limitation/Restriction for FOTO Initial Survey     Therapist reviewed FOTO scores for Diego Gunter on 1/27/2023.    FOTO documents entered into [employer] - see Media section.     Limitation Score: 50              Treatment   Diego received the following supervised modalities after being cleared for contraindications for 10 minutes: Hot pack applied to the Right Elbow and biceps with 1.5 weight wrapped around wrist area while supine on plinth to increase blood flow and circulation, and tissue extensibility prior to therex.    Diego received the following manual therapy techniques for 20 minutes while supine on plinth:   - STM about elbow and wrist incision, deep tissue massage to biceps muscle and forearm musculature   - Gentle sustained stretching into elbow extension  - Composite wrist flex/ext stretch    Diego performed therapeutic exercises for 27 minutes including:  - Supine on plinth: bilateral chest press with pvc 2 x10  - Supine on plinth: bilateral shoulder flexion with pvc  2 x10  - AROM elbow flex/ext in pro and sup position, sup/pro, wrist ext/flex (with shoulder in ~60 degrees flexion and left hand supporting posterior arm proximal to elbow) (x10 reps each)  - AAROM with wrist wheel to facilitate shoulder and elbow ROM (x2 min)  - Towel slides on table top to facilitate elbow extension (x2 min)  - Gentle hand squeezes with blue sponge - elbow over wedge for low load extension stretch  x 3 min    - Scapular retraction while seated (2 sets of 10) to facilitate proximal stability - required verbal and tactile cueing  - walk 3 mins holding 3 lb weight - VC and tactile cueing required to maintain full elbow extension       Home Exercises and Education Provided     Education provided:   - Reviewed HEP to include AROM elbow, forearm, wrist and supine elbow extension stretch  - Blue sponge squeezes instructed to add to HEP, 4-5x/day to facilitate tendon gliding and edema management    - Progress towards goals     Written Home Exercises Provided: Patient instructed to cont prior HEP.  Exercises were reviewed and Diego was  able to demonstrate them prior to the end of the session.  Diego demonstrated good  understanding of the HEP provided.   .   See EMR under Patient Instructions for exercises provided prior visit.        Assessment     Diego tolerated OT session well today including introduction of new elbow and shoulder active range of motion exercises supine on plinth. Post-tx, he demonstrated increased elbow passive extension (+5 degrees from yesterday's tx session). He continues to hold right arm in guarded posture (elbow flexed and shoulder internally rotated) and he required verbal and tactile cueing to bring elbow into extension end range during exercises. Added supine chest press and shoulder flexion to home exercise program, provided with printed instructions and pt and his wife verbalized understanding.     Diego is progressing well towards his goals and there are no updates to goals at this time. Pt prognosis is Fair.     Pt will continue to benefit from skilled outpatient occupational therapy to address the deficits listed in the problem list on initial evaluation provide pt/family education and to maximize pt's level of independence in the home and community environment.     Anticipated barriers to occupational therapy: PLOF, PMH/comorbidities    Pt's spiritual, cultural and educational needs considered and pt agreeable to plan of care and goals.    Goals:   LTG's (8 weeks):  1)   Demonstrate improved elbow active extension to -25 degrees to increase functional hand use for ADLs.  - Ongoing   2)   Demonstrate 20 lbs of right  strength to facilitate independence with activities of daily living  - Ongoing   3)   Decrease complaints of pain to  2 out of 10 at worst to increase functional hand use for ADL/work/leisure activities.  - Ongoing   4)   Patient to score at 40% or less on FOTO to demonstrate improved perception of functional RUE use.  - Ongoing   5) Demonstrate no greater than .5 cm difference in edema of  Right MPs and elbow crease compared to unaffected side.  - Ongoing      STG's (4 weeks)  1)   Patient to be IND with HEP and modalities for pain/edema managment.  - Ongoing   2)   Demonstrate -35 degrees of active elbow extension to increase functional hand use for ADLs/work/leisure activities.  Met 2/1  3) Demonstrate no greater than 1.0 cm difference in edema of Right MPs and elbow crease compared to unaffected side.  - Ongoing   4)   Decrease complaints of pain to  4 out of 10 at worst to increase functional hand use for ADL/work/leisure activities.  - Ongoing        Plan   Continue to progress as tolerated.  Updates/Grading for next session: Scapular mobilization and ROM, elbow and digit ROM      Svitlana Garcia, OTR/L

## 2023-02-02 NOTE — PATIENT INSTRUCTIONS
Use a hand held weight or strap a wrist weight around the wrist. Let elbow stretch for ~10 mins

## 2023-02-06 ENCOUNTER — CLINICAL SUPPORT (OUTPATIENT)
Dept: REHABILITATION | Facility: HOSPITAL | Age: 83
End: 2023-02-06
Payer: MEDICARE

## 2023-02-06 DIAGNOSIS — R29.898 RIGHT ARM WEAKNESS: ICD-10-CM

## 2023-02-06 DIAGNOSIS — R68.89 DECREASED FUNCTIONAL ACTIVITY TOLERANCE: ICD-10-CM

## 2023-02-06 DIAGNOSIS — M25.621 STIFFNESS OF RIGHT UPPER ARM JOINT: Primary | ICD-10-CM

## 2023-02-06 PROCEDURE — 97110 THERAPEUTIC EXERCISES: CPT

## 2023-02-06 PROCEDURE — 97140 MANUAL THERAPY 1/> REGIONS: CPT

## 2023-02-06 NOTE — PROGRESS NOTES
Occupational Therapy Daily Treatment Note     Name: Diego TIRADO [facility] Number: [id number]    Therapy Diagnosis:   Encounter Diagnoses   Name Primary?    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, right   G56.21 (ICD-10-CM) - Cubital tunnel syndrome on right   Z98.890 (ICD-10-CM) - Postoperative state      Evaluation Date: 1/27/2023  Plan of Care Certification Date: 04/21/23  Authorization Period: 1/27/23 - 2/24/23  Surgery Date and Procedure: 01/11/23  Right wrist Guyons decompression, carpal tunnel release and cubital tunnel release at the elbow   Date of Return to MD: 02/28/23  FOTO Completion: 1/30/23    Visit #: 4 / 11    Time In: 9:52 am  Time Out: 10:50 am  Total Billable Time: 48 minutes    Precautions:  Standard and Weightbearing      Subjective     Pt reports: He reports he has been doing his exercises at home. He notes difficulty with reaching for items on his Right side due to limited shoulder ROM.  he was compliant with home exercise program given last session.   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 Patient reports no numbness/tingling     Wound Assessment:   Incisions fully closed, no signs of infection  Size: incision length about 2 inches at elbow, and 1 inch at volar wrist  Location: Right medial elbow, volar wrist    Scar about elbow is supple, able to  and  roll  Scar about volar wrist is thickened, limited mobility     Edema: Circumferential measurements: In centimters       Left  1/27/2023 Right   1/27/2023   MPs 20.8 cm 22.5 cm   Proximal Palmar Crease    NT  NT   Proximal Wrist Crease 16.6 cm 17.2 cm        Left   1/27/2023 Right   1/27/2023   3 Inches distal to elbow crease 23.5 cm 23 cm   Elbow crease 24.7 cm 27.4 cm   3 Inches proximal to elbow crease NT NT         Range of Motion:      Shoulder AROM: Measured in degrees.    Left  1/27/2023 Right  1/27/2023   Extension/Flexion WFL ~70 flexion   Abduction WFL ~70   IR/ER WFL ~60 ER         Elbow and Wrist ROM. Measured in degrees.    Left  1/27/2023 Right  1/27/2023 Right   2/1/23 Right   2/2 Right:  2/6/23   Elbow Ext/Flex WNL -45/135  -35 PROM Extension:    -30 AROM  -20 PROM Extension:    Pre tx: -30  After heat, PROM: -15   Extension:    Pre tx: -25  Post tx: -20    PROM -20   Supination/Pronation 75/75 75/70      Wrist Ext/Flex 65/70 55/35      Wrist RD/UD 15/35 15/20            Hand ROM. Measured in degrees.     Pt unable to achieve full extension of Right hand, secondary to PIP contractures of the RF/SF, and not able to touch DPC with digits but demonstrates WFL for flexion of all digits    Left  1/27/2023 Right  1/27/2023                   Index: MP  WNL WFL              PIP                      DIP            Dist to DPC                  DELEON               Long:  MP                  PIP                  DIP             Dist to DPC                  DELEON               Ring:   MP                  PIP                  DIP             Dist to DPC                  DELEON               Small:  MP                   PIP                   DIP             Dist to DPC                  DELEON               Thumb: MP                    IP           Rad ADD/ABD           Pal ADD/ABD              Opposition            Dist to DPC 0 cm     4.5 cm        Sensation: Ulnar Nerve and Median Distribution   2/6/2023 2/6/2023    Left  Right   Monofilament Testing     Normal 1.65-2.83     Diminished Light Touch 3.22-3.61  3.61 on median nerve   Diminished Protective 3.84-4.31  4.31 on ulnar nerve   Loss of Protective 4.56-6.65     Untestable >6.65             Limitation/Restriction for FOTO Initial Survey     Therapist reviewed FOTO scores for Diego Gunter on 1/27/2023.   FOTO documents entered into EPIC - see Media section.     Limitation Score: 50              Treatment   Diego received the following supervised modalities after being cleared for contraindications for 10 minutes: Hot pack applied to the Right Elbow and biceps with towel rolled up under elbow while supine on plinth to increase blood flow and circulation, and tissue extensibility prior to therex.    Diego received the following manual therapy techniques for 15 minutes while supine on plinth:   - STM about elbow incision, deep tissue massage to biceps muscle and forearm musculature   - Gentle sustained stretching into elbow extension  - Scapular mobilization in all planes while supine, prior to scapular and shoulder A/AROM exercises  - STM with IASTM tool to the volar wrist incision    Diego performed therapeutic exercises for 33 minutes including:  - Supine on plinth: scapular retraction with assistance for moving into retraction  - Supine on plinth: bilateral chest press with pvc 1 x10  - Supine on plinth: bilateral shoulder flexion with pvc  1 x10  - Supine on plinth: Serratus anterior punch outs 1 x 10    - AROM elbow flex/ext in pro and sup position, sup/pro, wrist ext/flex (x10 reps each)  - Towel slides on table top to facilitate elbow extension/flex (x1 min)  - Towel slides on table top to facilitate scap retraction/protraction (x1 min) - verbal and tactile cueing for shoulder depression and scap retraction    - Assessment of sensation using [place]-New, reassessment of elbow ROM pre and post treatment  - Applied two KT I bands to volar wrist incision in X  pattern for spatial correction technique      Home Exercises and Education Provided     Education provided:   - Instructed to remove KT with soap and warm water if skin irritation occurs  - Pt education on safety due to diminished protective sensation throughout ulnar n. distribution  - Reviewed HEP to include AROM elbow, forearm, wrist and supine elbow extension stretch, blue sponge squeezes    - Progress towards goals     Written Home Exercises Provided: Patient instructed to cont prior HEP.  Exercises were reviewed and Diego was able to demonstrate them prior to the end of the session.  Diego demonstrated good  understanding of the HEP provided.   .   See EMR under Patient Instructions for exercises provided prior visit.        Assessment     Diego tolerated all treatment tasks well with mild complaints of shoulder pain with AAROM at end range of flexion. Muscle activation of rhomboids/middle trap was palpable during supine scapular retraction but remains with limited mobility and observed substitution patterns such as scapular elevation. He requires verbal and tactile cueing at times for scapular depression and correct technique during scpaular and shoulder exercises. He continues to demonstrate improving elbow ROM. His volar wrist incision remains quite thick and limited mobility however and will benefit from application of KT for spatial correction. Pt and spouse show good understanding to remove if irritation occurs. He will continue to benefit from progression of scapular/shoulder exercises for proximal stability and to facilitate distal mobility, elbow/wrist/hand ROM, scar management and progression of strengthening as tolerated.    Diego is progressing well towards his goals and there are no updates to goals at this time. Pt prognosis is Fair.     Pt will continue to benefit from skilled outpatient occupational therapy to address the deficits listed in the problem list on initial evaluation provide  pt/family education and to maximize pt's level of independence in the home and community environment.     Anticipated barriers to occupational therapy: PLOF, PMH/comorbidities    Pt's spiritual, cultural and educational needs considered and pt agreeable to plan of care and goals.    Goals:   LTG's (8 weeks):  1)   Demonstrate improved elbow active extension to -25 degrees to increase functional hand use for ADLs.  - Ongoing   2)   Demonstrate 20 lbs of right  strength to facilitate independence with activities of daily living  - Ongoing   3)   Decrease complaints of pain to  2 out of 10 at worst to increase functional hand use for ADL/work/leisure activities.  - Ongoing   4)   Patient to score at 40% or less on FOTO to demonstrate improved perception of functional RUE use.  - Ongoing   5) Demonstrate no greater than .5 cm difference in edema of Right MPs and elbow crease compared to unaffected side.  - Ongoing      STG's (4 weeks)  1)   Patient to be IND with HEP and modalities for pain/edema managment.  - Ongoing   2)   Demonstrate -35 degrees of active elbow extension to increase functional hand use for ADLs/work/leisure activities.  Met 2/1  3) Demonstrate no greater than 1.0 cm difference in edema of Right MPs and elbow crease compared to unaffected side.  - Ongoing   4)   Decrease complaints of pain to  4 out of 10 at worst to increase functional hand use for ADL/work/leisure activities.  - Ongoing        Plan   Continue to progress as tolerated.  Updates/Grading for next session: Assess KT tolerance      Destiney Hankins OTR/L

## 2023-02-08 ENCOUNTER — LAB VISIT (OUTPATIENT)
Dept: LAB | Facility: HOSPITAL | Age: 83
End: 2023-02-08
Attending: INTERNAL MEDICINE
Payer: MEDICARE

## 2023-02-08 DIAGNOSIS — A31.0 MYCOBACTERIUM AVIUM-INTRACELLULARE COMPLEX: ICD-10-CM

## 2023-02-08 PROCEDURE — 87118 MYCOBACTERIC IDENTIFICATION: CPT | Performed by: INTERNAL MEDICINE

## 2023-02-08 PROCEDURE — 87118 MYCOBACTERIC IDENTIFICATION: CPT

## 2023-02-08 PROCEDURE — 87116 MYCOBACTERIA CULTURE: CPT | Performed by: INTERNAL MEDICINE

## 2023-02-08 PROCEDURE — 87015 SPECIMEN INFECT AGNT CONCNTJ: CPT | Performed by: INTERNAL MEDICINE

## 2023-02-08 PROCEDURE — 87206 SMEAR FLUORESCENT/ACID STAI: CPT | Performed by: INTERNAL MEDICINE

## 2023-02-09 ENCOUNTER — OFFICE VISIT (OUTPATIENT)
Dept: INTERNAL MEDICINE | Facility: CLINIC | Age: 83
End: 2023-02-09
Payer: MEDICARE

## 2023-02-09 VITALS
BODY MASS INDEX: 23.54 KG/M2 | HEART RATE: 79 BPM | HEIGHT: 69 IN | SYSTOLIC BLOOD PRESSURE: 102 MMHG | WEIGHT: 158.94 LBS | OXYGEN SATURATION: 94 % | DIASTOLIC BLOOD PRESSURE: 70 MMHG

## 2023-02-09 DIAGNOSIS — R05.9 COUGH: ICD-10-CM

## 2023-02-09 DIAGNOSIS — J43.2 CENTRILOBULAR EMPHYSEMA: ICD-10-CM

## 2023-02-09 DIAGNOSIS — A31.0 PULMONARY MAI (MYCOBACTERIUM AVIUM-INTRACELLULARE) INFECTION: ICD-10-CM

## 2023-02-09 DIAGNOSIS — J06.9 VIRAL URI WITH COUGH: Primary | ICD-10-CM

## 2023-02-09 PROCEDURE — 1160F PR REVIEW ALL MEDS BY PRESCRIBER/CLIN PHARMACIST DOCUMENTED: ICD-10-PCS | Mod: CPTII,S$GLB,, | Performed by: INTERNAL MEDICINE

## 2023-02-09 PROCEDURE — 3074F PR MOST RECENT SYSTOLIC BLOOD PRESSURE < 130 MM HG: ICD-10-PCS | Mod: CPTII,S$GLB,, | Performed by: INTERNAL MEDICINE

## 2023-02-09 PROCEDURE — 1157F PR ADVANCE CARE PLAN OR EQUIV PRESENT IN MEDICAL RECORD: ICD-10-PCS | Mod: CPTII,S$GLB,, | Performed by: INTERNAL MEDICINE

## 2023-02-09 PROCEDURE — 3074F SYST BP LT 130 MM HG: CPT | Mod: CPTII,S$GLB,, | Performed by: INTERNAL MEDICINE

## 2023-02-09 PROCEDURE — 1125F AMNT PAIN NOTED PAIN PRSNT: CPT | Mod: CPTII,S$GLB,, | Performed by: INTERNAL MEDICINE

## 2023-02-09 PROCEDURE — 99214 OFFICE O/P EST MOD 30 MIN: CPT | Mod: S$GLB,,, | Performed by: INTERNAL MEDICINE

## 2023-02-09 PROCEDURE — 3288F FALL RISK ASSESSMENT DOCD: CPT | Mod: CPTII,S$GLB,, | Performed by: INTERNAL MEDICINE

## 2023-02-09 PROCEDURE — 99999 PR PBB SHADOW E&M-EST. PATIENT-LVL III: ICD-10-PCS | Mod: PBBFAC,,, | Performed by: INTERNAL MEDICINE

## 2023-02-09 PROCEDURE — 99214 PR OFFICE/OUTPT VISIT, EST, LEVL IV, 30-39 MIN: ICD-10-PCS | Mod: S$GLB,,, | Performed by: INTERNAL MEDICINE

## 2023-02-09 PROCEDURE — 1101F PR PT FALLS ASSESS DOC 0-1 FALLS W/OUT INJ PAST YR: ICD-10-PCS | Mod: CPTII,S$GLB,, | Performed by: INTERNAL MEDICINE

## 2023-02-09 PROCEDURE — 1101F PT FALLS ASSESS-DOCD LE1/YR: CPT | Mod: CPTII,S$GLB,, | Performed by: INTERNAL MEDICINE

## 2023-02-09 PROCEDURE — 1157F ADVNC CARE PLAN IN RCRD: CPT | Mod: CPTII,S$GLB,, | Performed by: INTERNAL MEDICINE

## 2023-02-09 PROCEDURE — 3288F PR FALLS RISK ASSESSMENT DOCUMENTED: ICD-10-PCS | Mod: CPTII,S$GLB,, | Performed by: INTERNAL MEDICINE

## 2023-02-09 PROCEDURE — 99999 PR PBB SHADOW E&M-EST. PATIENT-LVL III: CPT | Mod: PBBFAC,,, | Performed by: INTERNAL MEDICINE

## 2023-02-09 PROCEDURE — 1125F PR PAIN SEVERITY QUANTIFIED, PAIN PRESENT: ICD-10-PCS | Mod: CPTII,S$GLB,, | Performed by: INTERNAL MEDICINE

## 2023-02-09 PROCEDURE — 1159F PR MEDICATION LIST DOCUMENTED IN MEDICAL RECORD: ICD-10-PCS | Mod: CPTII,S$GLB,, | Performed by: INTERNAL MEDICINE

## 2023-02-09 PROCEDURE — 1160F RVW MEDS BY RX/DR IN RCRD: CPT | Mod: CPTII,S$GLB,, | Performed by: INTERNAL MEDICINE

## 2023-02-09 PROCEDURE — 3078F PR MOST RECENT DIASTOLIC BLOOD PRESSURE < 80 MM HG: ICD-10-PCS | Mod: CPTII,S$GLB,, | Performed by: INTERNAL MEDICINE

## 2023-02-09 PROCEDURE — 3078F DIAST BP <80 MM HG: CPT | Mod: CPTII,S$GLB,, | Performed by: INTERNAL MEDICINE

## 2023-02-09 PROCEDURE — U0005 INFEC AGEN DETEC AMPLI PROBE: HCPCS | Performed by: INTERNAL MEDICINE

## 2023-02-09 PROCEDURE — 1159F MED LIST DOCD IN RCRD: CPT | Mod: CPTII,S$GLB,, | Performed by: INTERNAL MEDICINE

## 2023-02-09 RX ORDER — AZELASTINE 1 MG/ML
2 SPRAY, METERED NASAL 2 TIMES DAILY
Qty: 30 ML | Refills: 0 | Status: SHIPPED | OUTPATIENT
Start: 2023-02-09

## 2023-02-09 NOTE — PROGRESS NOTES
Medical history   Hypertension  Hyperlipidemia  Type 2 diabetes per chart review, diet control  Prostate cancer, status post prostatectomy  COPD/emphysema   Antiphospholipid syndrome   Cavitary lung disease with HILLARY infection  Left ischemic middle cerebral artery CVA   Cervical epidural hematoma, with right hemiparesis, status post right laminectomy April 2022  History of colon polyps  Pulmonary embolism, January 2021, on Coumadin up to August 2021  Prostate cancer, status post prostatectomy  Right inguinal hernia repair with mesh October 2022  Right wrist surgery, ulnar nerve and carpal tunnel release  Chronic anemia, since right inguinal hernia repair    Medications   Azithromycin 500 mg daily   Ethambutol 400 mg 2 tablets daily   Pravastatin 10 mg daily   Saline nebulization    82-year-old male  Who is here with the wife who provides some of the history.    For the past 2 days notice sneezing rhinorrhea clear mucus more of a cough coughing up times yellow mucus.  No acute shortness of breath or wheezing.  At 1st day there was a sore throat but this is improved.  No ear pain    He has a history of HILLARY infection treated with antibiotics described above.  He has saline nebulization twice a day to help clear up secretions.  Normally coughs up a clears up clear secretions with the use of the nebulizer.  Prior to 2 days ago there has been no spontaneous coughing.  He reports feeling little bit better today compared to the past 2 days at 1st he felt weak    Examination   Weight 158 lb   Pulse 80   Blood pressure by me 120/72  Bilateral hearing aids   Cerumen impaction in both ears which was removed  Tympanic membranes look normal   Nasal mucosa is congested with clear mucus  Oropharynx no abnormal findings   Neck no adenopathy  Chest clear breath sounds there is no wheezes or rales and good air movement   Heart regular rate rhythm    Impression   Viral respiratory infection with cough  Chronic lung disease with HILLARY  infection in emphysema, with no apparent acute decompensation  Plan   COVID swab   Astelin nasal spray 2 puffs each nostril twice a day and can take a combination of DayQuil/NyQuil

## 2023-02-10 ENCOUNTER — PATIENT MESSAGE (OUTPATIENT)
Dept: INTERNAL MEDICINE | Facility: CLINIC | Age: 83
End: 2023-02-10
Payer: MEDICARE

## 2023-02-10 LAB — SARS-COV-2 RNA RESP QL NAA+PROBE: NOT DETECTED

## 2023-02-13 ENCOUNTER — CLINICAL SUPPORT (OUTPATIENT)
Dept: REHABILITATION | Facility: HOSPITAL | Age: 83
End: 2023-02-13
Payer: MEDICARE

## 2023-02-13 DIAGNOSIS — R68.89 DECREASED FUNCTIONAL ACTIVITY TOLERANCE: ICD-10-CM

## 2023-02-13 DIAGNOSIS — R29.898 RIGHT ARM WEAKNESS: ICD-10-CM

## 2023-02-13 DIAGNOSIS — M25.621 STIFFNESS OF RIGHT UPPER ARM JOINT: Primary | ICD-10-CM

## 2023-02-13 PROCEDURE — 97140 MANUAL THERAPY 1/> REGIONS: CPT

## 2023-02-13 PROCEDURE — 97110 THERAPEUTIC EXERCISES: CPT

## 2023-02-13 NOTE — PROGRESS NOTES
Occupational Therapy Daily Treatment Note     Name: Diego TIRADO [facility] Number: [id number]    Therapy Diagnosis:   Encounter Diagnoses   Name Primary?    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 (ICD-10-CM) - Cubital tunnel syndrome on right   Z98.890 (ICD-10-CM) - Postoperative state      Evaluation Date: 1/27/2023  Plan of Care Certification Date: 04/21/23  Authorization Period: 1/27/23 - 2/24/23  Surgery Date and Procedure: 01/11/23  Right wrist Guyons decompression, carpal tunnel release and cubital tunnel release at the elbow   Date of Return to MD: 02/28/23  FOTO Completion: 1/30/23    Visit #: 4 / 11    Time In: 9:52 am  Time Out: 10:50 am  Total Billable Time: 48 minutes    Precautions:  Standard and Weightbearing      Subjective     Pt reports: He reports he has been doing his exercises at home. He notes difficulty with reaching for items on his Right side due to limited shoulder ROM.  he was compliant with home exercise program given last session.   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 Patient reports no numbness/tingling     Wound Assessment:   Incisions fully closed, no signs of infection  Size: incision length about 2 inches at elbow, and 1 inch at volar wrist  Location: Right medial elbow, volar wrist    Scar about elbow is supple, able to  and  roll  Scar about volar wrist is thickened, limited mobility     Edema: Circumferential measurements: In centimters       Left  1/27/2023 Right   1/27/2023   MPs 20.8 cm 22.5 cm   Proximal Palmar Crease    NT  NT   Proximal Wrist Crease 16.6 cm 17.2 cm        Left   1/27/2023 Right   1/27/2023   3 Inches distal to elbow crease 23.5 cm 23 cm   Elbow crease 24.7 cm 27.4 cm   3 Inches proximal to elbow crease NT NT         Range of Motion:      Shoulder AROM: Measured in degrees.    Left  1/27/2023 Right  1/27/2023   Extension/Flexion WFL ~70 flexion   Abduction WFL ~70   IR/ER WFL ~60 ER         Elbow and Wrist ROM. Measured in degrees.    Left  1/27/2023 Right  1/27/2023 Right   2/1/23 Right   2/2 Right:  2/6/23   Elbow Ext/Flex WNL -45/135  -35 PROM Extension:    -30 AROM  -20 PROM Extension:    Pre tx: -30  After heat, PROM: -15   Extension:    Pre tx: -25  Post tx: -20    PROM -20   Supination/Pronation 75/75 75/70      Wrist Ext/Flex 65/70 55/35      Wrist RD/UD 15/35 15/20            Hand ROM. Measured in degrees.     Pt unable to achieve full extension of Right hand, secondary to PIP contractures of the RF/SF, and not able to touch DPC with digits but demonstrates WFL for flexion of all digits    Left  1/27/2023 Right  1/27/2023                   Index: MP  WNL WFL              PIP                      DIP            Dist to DPC                  DELEON               Long:  MP                  PIP                  DIP             Dist to DPC                  DELEON               Ring:   MP                  PIP                  DIP             Dist to DPC                  DELEON               Small:  MP                   PIP                   DIP             Dist to DPC                  DELEON               Thumb: MP                    IP           Rad ADD/ABD           Pal ADD/ABD              Opposition            Dist to DPC 0 cm     4.5 cm        Sensation: Ulnar Nerve and Median Distribution   2/13/2023 2/13/2023    Left  Right   Monofilament Testing     Normal 1.65-2.83     Diminished Light Touch 3.22-3.61  3.61 on median nerve   Diminished Protective 3.84-4.31  4.31 on ulnar nerve   Loss of Protective 4.56-6.65     Untestable >6.65             Limitation/Restriction for FOTO Initial Survey     Therapist reviewed FOTO scores for Diego Gunter on 1/27/2023.   FOTO documents entered into EPIC - see Media section.     Limitation Score: 50              Treatment   Diego received the following supervised modalities after being cleared for contraindications for 10 minutes: Hot pack applied to the Right Elbow and biceps with towel rolled up under elbow while supine on plinth to increase blood flow and circulation, and tissue extensibility prior to therex.    Diego received the following manual therapy techniques for 15 minutes while supine on plinth:   - STM about elbow incision, deep tissue massage to biceps muscle and forearm musculature   - Gentle sustained stretching into elbow extension  - Scapular mobilization in all planes while supine, prior to scapular and shoulder A/AROM exercises  - STM with IASTM tool to the volar wrist incision    Diego performed therapeutic exercises for 33 minutes including:  - Reassessment of elbow ROM,  and pinch strength  - Supine on plinth: scapular retraction with assistance for moving into retraction  - Supine on plinth: bilateral chest press with pvc 1 x10  - Supine on plinth: bilateral shoulder flexion with pvc  1 x10  - Supine on plinth: Serratus anterior punch outs 1 x 10    - AROM elbow flex/ext in pro and sup position, sup/pro, wrist ext/flex (x10 reps each)  - Towel slides on table top to facilitate elbow extension/flex (x1 min)  - Towel slides on table top to facilitate scap retraction/protraction (x1 min) - verbal and tactile cueing for shoulder depression and scap retraction    - Assessment of sensation using [place]-New, reassessment of elbow ROM pre and post treatment  -  Applied two KT I bands to volar wrist incision in X pattern for spatial correction technique      Home Exercises and Education Provided     Education provided:   - Instructed to remove KT with soap and warm water if skin irritation occurs  - Pt education on safety due to diminished protective sensation throughout ulnar n. distribution  - Reviewed HEP to include AROM elbow, forearm, wrist and supine elbow extension stretch, blue sponge squeezes    - Progress towards goals     Written Home Exercises Provided: Patient instructed to cont prior HEP.  Exercises were reviewed and Diego was able to demonstrate them prior to the end of the session.  Diego demonstrated good  understanding of the HEP provided.   .   See EMR under Patient Instructions for exercises provided prior visit.        Assessment     Diego tolerated all treatment tasks well with mild complaints of shoulder pain with AAROM at end range of flexion. Muscle activation of rhomboids/middle trap was palpable during supine scapular retraction but remains with limited mobility and observed substitution patterns such as scapular elevation. He requires verbal and tactile cueing at times for scapular depression and correct technique during scpaular and shoulder exercises. He continues to demonstrate improving elbow ROM. His volar wrist incision remains quite thick and limited mobility however and will benefit from application of KT for spatial correction. Pt and spouse show good understanding to remove if irritation occurs. He will continue to benefit from progression of scapular/shoulder exercises for proximal stability and to facilitate distal mobility, elbow/wrist/hand ROM, scar management and progression of strengthening as tolerated.    Diego is progressing well towards his goals and there are no updates to goals at this time. Pt prognosis is Fair.     Pt will continue to benefit from skilled outpatient occupational therapy to address the deficits listed  in the problem list on initial evaluation provide pt/family education and to maximize pt's level of independence in the home and community environment.     Anticipated barriers to occupational therapy: PLOF, PMH/comorbidities    Pt's spiritual, cultural and educational needs considered and pt agreeable to plan of care and goals.    Goals:   LTG's (8 weeks):  1)   Demonstrate improved elbow active extension to -25 degrees to increase functional hand use for ADLs.  - Ongoing   2)   Demonstrate 20 lbs of right  strength to facilitate independence with activities of daily living  - Ongoing   3)   Decrease complaints of pain to  2 out of 10 at worst to increase functional hand use for ADL/work/leisure activities.  - Ongoing   4)   Patient to score at 40% or less on FOTO to demonstrate improved perception of functional RUE use.  - Ongoing   5) Demonstrate no greater than .5 cm difference in edema of Right MPs and elbow crease compared to unaffected side.  - Ongoing      STG's (4 weeks)  1)   Patient to be IND with HEP and modalities for pain/edema managment.  - Ongoing   2)   Demonstrate -35 degrees of active elbow extension to increase functional hand use for ADLs/work/leisure activities.  Met 2/1  3) Demonstrate no greater than 1.0 cm difference in edema of Right MPs and elbow crease compared to unaffected side.  - Ongoing   4)   Decrease complaints of pain to  4 out of 10 at worst to increase functional hand use for ADL/work/leisure activities.  - Ongoing        Plan   Continue to progress as tolerated.  Updates/Grading for next session: Assess KT tolerance      Destiney Hankins OTR/L

## 2023-02-13 NOTE — PROGRESS NOTES
"  Occupational Therapy Daily Treatment Note     Name: Diego Gunter  [facility] Number: [id number]    Therapy Diagnosis:   Encounter Diagnoses   Name Primary?    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 (ICD-10-CM) - Cubital tunnel syndrome on right   Z98.890 (ICD-10-CM) - Postoperative state      Evaluation Date: 1/27/2023  Plan of Care Certification Date: 04/21/23  Authorization Period: 1/27/23 - 2/24/23  Surgery Date and Procedure: 01/11/23  Right wrist Guyons decompression, carpal tunnel release and cubital tunnel release at the elbow   Date of Return to MD: 02/28/23  FOTO Completion: 1/30/23    Visit #: 6 / 11    Time In: 10:06 am  Time Out: 11:00 am  Total Billable Time:  54 minutes    Precautions:  Standard      Subjective     Pt reports: "My elbow was really sore when I woke up the other day (pointing to posterior elbow). I did my exercises and it felt a bit better."  he was compliant with home exercise program given last session.   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     Wound Assessment:   Incisions fully closed, no signs of infection  Size: incision length about 2 inches at elbow, and 1 inch at volar wrist  Location: Right medial elbow, volar wrist    Scar about elbow is supple, able to  and roll  Scar about volar wrist is thickened, limited mobility     Edema: " Circumferential measurements: In centimters       Left  1/27/2023 Right   1/27/2023   MPs 20.8 cm 22.5 cm   Proximal Palmar Crease    NT  NT   Proximal Wrist Crease 16.6 cm 17.2 cm        Left   1/27/2023 Right   1/27/2023   3 Inches distal to elbow crease 23.5 cm 23 cm   Elbow crease 24.7 cm 27.4 cm   3 Inches proximal to elbow crease NT NT         Shoulder AROM: Measured in degrees.    Left  1/27/2023 Right  1/27/2023   Extension/Flexion WFL ~70 flexion   Abduction WFL ~70   IR/ER WFL ~60 ER         Elbow and Wrist ROM. Measured in degrees.    Left  1/27/2023 Right  1/27/2023 Right   2/1/23 Right   2/2 Right:  2/6/23 Right:  2/13/23   Elbow Ext/Flex WNL -45/135  -35 PROM Extension:    -30 AROM  -20 PROM Extension:    Pre tx: -30  After heat, PROM: -15   Extension:    Pre tx: -25  Post tx: -20    PROM -20 Extension:    Pre: -30  Post: -30    PROM: -20   Supination/Pronation 75/75 75/70       Wrist Ext/Flex 65/70 55/35       Wrist RD/UD 15/35 15/20             Hand ROM. Measured in degrees.     Pt unable to achieve full extension of Right hand, secondary to PIP contractures of the RF/SF, and not able to touch DPC with digits but demonstrates WFL for flexion of all digits    Left  1/27/2023 Right  1/27/2023   Thumb: MP                    IP           Rad ADD/ABD           Pal ADD/ABD              Opposition            Dist to DPC 0 cm     4.5 cm        Sensation: Ulnar Nerve and Median Distribution   2/13/2023 2/13/2023    Left Right   Monofilament Testing     Normal 1.65-2.83     Diminished Light Touch 3.22-3.61  3.61 on median nerve   Diminished Protective 3.84-4.31  4.31 on ulnar nerve   Loss of Protective 4.56-6.65     Untestable >6.65       STRENGTH: (Measured in pounds using a Dynamometer and pinch meter)   Right Left    2/13/2023 2/13/2023    Setting 2 25, 25, 25 65, 65, 70    Average 25 67   Key 4 17   3 Pt NT due to inadequate contact of IF with pinch meter d/t amputation NT   Tip 4 14            Limitation/Restriction for FOTO Initial Survey     Therapist reviewed FOTO scores for Diego Gunter on 1/27/2023.   FOTO documents entered into [employer] - see Media section.     Limitation Score: 50              Treatment   Diego received the following supervised modalities after being cleared for contraindications for 10 minutes: Hot pack applied to the Right Elbow and biceps with towel rolled up under elbow while supine on plinth to increase blood flow and circulation, and tissue extensibility prior to therex.    Diego received the following manual therapy techniques for 15 minutes while supine on plinth:   - STM with IASTM tool to the volar wrist and elbow incision, biceps muscle and forearm musculature   - Gentle sustained stretching into elbow extension  - Gentle isolated and composite wrist ext stretch    Diego performed therapeutic exercises for 29 minutes including:  - Reassessment of elbow ROM,  and pinch strength  - Supine on plinth: bilateral chest press with 2 lb dowel, 2 x10  - Supine on plinth: bilateral shoulder flexion with 2 lb dowel,  2 x10  - Supine on plinth: overhead triceps extension with 2 kg yellow ball, 2 x 10    - AROM elbow flex/ext in pro and sup position, sup/pro, wrist ext/flex (x10 reps each)  - Towel slides on table top to facilitate elbow extension/flex (x1 min)  - Yellow theraputty  and lateral pinches - instructed to add to HEP, performing 3-5 min, 1x/day  - Applied two KT I bands to volar wrist incision in X pattern for spatial correction technique      Home Exercises and Education Provided     Education provided:   - Instructed to remove KT with soap and warm water if skin irritation occurs  - Pt education on safety due to diminished protective sensation throughout ulnar n. distribution  - Reviewed HEP to include AROM elbow, forearm, wrist and supine elbow extension stretch, blue sponge squeezes, and addition of yellow therpautty tasks  - Progress towards goals      Written Home Exercises Provided: Patient instructed to cont prior HEP.  Exercises were reviewed and Diego was able to demonstrate them prior to the end of the session.  Diego demonstrated good  understanding of the HEP provided.     See EMR under Patient Instructions for exercises provided prior visit.        Assessment     Diego tolerated all treatment tasks well with no complaints of pain. He continues to have limited elbow extension, with AROM measurements indicating worsening extension today compared to last session. However active elbow extension appeared to be much improved when performing functional activity such as donning jacket at end of session. He has limited  and pinch strength compared to Left side, and visible atrophy of 1st dorsal interossei and thenar muscles. He will benefit from continued efforts with P/AROM of shoulder, elbow, wrist as well as progression of strengthening for proximal stabilization muscles, triceps, and /pinch strengthening. Diego is progressing well towards his goals and there are no updates to goals at this time. Pt prognosis is Fair.     Pt will continue to benefit from skilled outpatient occupational therapy to address the deficits listed in the problem list on initial evaluation provide pt/family education and to maximize pt's level of independence in the home and community environment.     Anticipated barriers to occupational therapy: PLOF, PMH/comorbidities    Pt's spiritual, cultural and educational needs considered and pt agreeable to plan of care and goals.    Goals:   LTG's (8 weeks):  1)   Demonstrate improved elbow active extension to -25 degrees to increase functional hand use for ADLs.  - Ongoing   2)   Demonstrate 20 lbs of right  strength to facilitate independence with activities of daily living  - Ongoing   3)   Decrease complaints of pain to  2 out of 10 at worst to increase functional hand use for ADL/work/leisure activities.  - Ongoing    4)   Patient to score at 40% or less on FOTO to demonstrate improved perception of functional RUE use.  - Ongoing   5) Demonstrate no greater than .5 cm difference in edema of Right MPs and elbow crease compared to unaffected side.  - Ongoing      STG's (4 weeks)  1)   Patient to be IND with HEP and modalities for pain/edema managment.  - Ongoing   2)   Demonstrate -35 degrees of active elbow extension to increase functional hand use for ADLs/work/leisure activities.  Met 2/1  3) Demonstrate no greater than 1.0 cm difference in edema of Right MPs and elbow crease compared to unaffected side.  - Ongoing   4)   Decrease complaints of pain to  4 out of 10 at worst to increase functional hand use for ADL/work/leisure activities.  - Ongoing        Plan   Continue to progress as tolerated.  Updates/Grading for next session: Progress with strengthening and weightbearing as tolerated, such as wall push ups or table top weightbearing onto sponge, continue with scar management and elbow/wrist/hand A/PROM      Destiney Hankins OTR/L

## 2023-02-13 NOTE — PATIENT INSTRUCTIONS
[facility]  OCCUPATIONAL THERAPY  HOME EXERCISE PROGRAM     Complete the following strengthening program 1x/day.              _         Destiney Hankins OTR/L  Occupational Therapist         27-Jun-2019 10:53

## 2023-02-16 ENCOUNTER — CLINICAL SUPPORT (OUTPATIENT)
Dept: REHABILITATION | Facility: HOSPITAL | Age: 83
End: 2023-02-16
Payer: MEDICARE

## 2023-02-16 DIAGNOSIS — M25.621 STIFFNESS OF RIGHT UPPER ARM JOINT: Primary | ICD-10-CM

## 2023-02-16 DIAGNOSIS — R29.898 RIGHT ARM WEAKNESS: ICD-10-CM

## 2023-02-16 DIAGNOSIS — R68.89 DECREASED FUNCTIONAL ACTIVITY TOLERANCE: ICD-10-CM

## 2023-02-16 PROCEDURE — 97140 MANUAL THERAPY 1/> REGIONS: CPT

## 2023-02-16 PROCEDURE — 97110 THERAPEUTIC EXERCISES: CPT

## 2023-02-16 NOTE — PROGRESS NOTES
"  Occupational Therapy Daily Treatment Note     Name: Diego TIRADO [facility] Number: [id number]    Therapy Diagnosis:   Encounter Diagnoses   Name Primary?    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, right   G56.21 (ICD-10-CM) - Cubital tunnel syndrome on right   Z98.890 (ICD-10-CM) - Postoperative state      Evaluation Date: 1/27/2023  Plan of Care Certification Date: 04/21/23  Authorization Period: 1/27/23 - 2/24/23  Surgery Date and Procedure: 01/11/23  Right wrist Guyons decompression, carpal tunnel release and cubital tunnel release at the elbow   Date of Return to MD: 02/28/23  FOTO Completion: 1/30/23    Visit #: 6 / 11    Time In: 10:00 am  Time Out: 11:00 am  Total Billable Time:  60 minutes    Precautions:  Standard      Subjective     Pt reports: "Things are going good in therapy. I do a lot of movement with my hand. I've been using the yellow putty at home."   he was compliant with home exercise program given last session.   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     Wound Assessment:   Incisions fully closed, no signs of infection  Size: incision length about 2 inches at elbow, and 1 inch at volar wrist  Location: Right medial elbow, volar wrist    Scar about elbow is supple, able to  and roll  Scar about volar wrist is thickened, limited mobility     Edema: Circumferential " measurements: In centimters       Left  1/27/2023 Right   1/27/2023   MPs 20.8 cm 22.5 cm   Proximal Palmar Crease    NT  NT   Proximal Wrist Crease 16.6 cm 17.2 cm        Left   1/27/2023 Right   1/27/2023   3 Inches distal to elbow crease 23.5 cm 23 cm   Elbow crease 24.7 cm 27.4 cm   3 Inches proximal to elbow crease NT NT         Shoulder AROM: Measured in degrees.    Left  1/27/2023 Right  1/27/2023   Extension/Flexion WFL ~70 flexion   Abduction WFL ~70   IR/ER WFL ~60 ER         Elbow and Wrist ROM. Measured in degrees.    Left  1/27/2023 Right  1/27/2023 Right   2/1/23 Right   2/2 Right:  2/6/23 Right:  2/13/23 Right   2/16   Elbow Ext/Flex WNL -45/135  -35 PROM Extension:    -30 AROM  -20 PROM Extension:    Pre tx: -30  After heat, PROM: -15   Extension:    Pre tx: -25  Post tx: -20    PROM -20 Extension:    Pre: -30  Post: -30    PROM: -20 Extension     After heat:  -25   Supination/Pronation 75/75 75/70        Wrist Ext/Flex 65/70 55/35        Wrist RD/UD 15/35 15/20              Hand ROM. Measured in degrees.     Pt unable to achieve full extension of Right hand, secondary to PIP contractures of the RF/SF, and not able to touch DPC with digits but demonstrates WFL for flexion of all digits    Left  1/27/2023 Right  1/27/2023   Thumb: MP                    IP           Rad ADD/ABD           Pal ADD/ABD              Opposition            Dist to DPC 0 cm     4.5 cm        Sensation: Ulnar Nerve and Median Distribution   2/16/2023 2/16/2023    Left Right   Monofilament Testing     Normal 1.65-2.83     Diminished Light Touch 3.22-3.61  3.61 on median nerve   Diminished Protective 3.84-4.31  4.31 on ulnar nerve   Loss of Protective 4.56-6.65     Untestable >6.65       STRENGTH: (Measured in pounds using a Dynamometer and pinch meter)   Right Left    2/16/2023 2/16/2023    Setting 2 25, 25, 25 65, 65, 70    Average 25 67   Key 4 17   3 Pt NT due to inadequate contact of IF with pinch meter d/t  amputation NT   Tip 4 14           Limitation/Restriction for FOTO Initial Survey     Therapist reviewed FOTO scores for Diego Gunter on 1/27/2023.   FOTO documents entered into [employer] - see Media section.     Limitation Score: 50              Treatment   Diego received the following supervised modalities after being cleared for contraindications for 10 minutes:   - Hot pack applied to the Right Elbow and biceps with elbow in extension over wedge, forearm in supination (additional hot pack around right hand) to increase blood flow and circulation, and tissue extensibility, and for a low load elbow extension stretch prior to therex.    Supervised modalities not done supine on plinth today due to mat being used by another patient.       Diego received the following manual therapy techniques for 15 minutes:   - STM with IASTM tool to the volar wrist and elbow incision, biceps muscle and forearm musculature   - Gentle sustained stretching into elbow extension  - Gentle isolated and composite wrist ext stretch    Diego performed therapeutic exercises for 35 minutes including:  - Reassessment of elbow ROM  - Supine on plinth: bilateral chest press with 2 lb dowel, 2 x10  - Supine on plinth: bilateral shoulder flexion with 2 lb dowel,  2 x10  - Supine on plinth: overhead triceps extension with 2 kg yellow ball, 2 x 10  - AROM elbow flex/ext in pro and sup position, sup/pro, wrist ext/flex (x10 reps each)  - Towel slides on table top to facilitate elbow extension/flex (x1 min)  - Yellow theraputty  and lateral pinches - instructed to add to HEP, performing 3-5 min, 1x/day  - Yellow theraband triceps extension  2 x20      NT: Applied two KT I bands to volar wrist incision in X pattern for spatial correction technique      Home Exercises and Education Provided     Education provided:   - Instructed to remove KT with soap and warm water if skin irritation occurs  - Pt education on safety due to diminished protective  sensation throughout ulnar n. distribution  - Reviewed HEP to include AROM elbow, forearm, wrist and supine elbow extension stretch, blue sponge squeezes, and addition of yellow therpautty tasks  - Progress towards goals     Written Home Exercises Provided: Patient instructed to cont prior HEP.  Exercises were reviewed and Diego was able to demonstrate them prior to the end of the session.  Diego demonstrated good  understanding of the HEP provided.     See EMR under Patient Instructions for exercises provided prior visit.        Assessment     Diego tolerated all treatment tasks well with no complaints of pain. He continues to have limited elbow extension (-25 degrees) however he reports this does not limit his function.  He tolerated introduction of new triceps strengthening exercise with yellow theraband well. He will benefit from continued efforts with P/AROM of shoulder, elbow, wrist as well as progression of strengthening for proximal stabilization muscles, triceps, and /pinch strengthening.     Diego is progressing well towards his goals and there are no updates to goals at this time. Pt prognosis is Fair.     Pt will continue to benefit from skilled outpatient occupational therapy to address the deficits listed in the problem list on initial evaluation provide pt/family education and to maximize pt's level of independence in the home and community environment.     Anticipated barriers to occupational therapy: PLOF, PMH/comorbidities    Pt's spiritual, cultural and educational needs considered and pt agreeable to plan of care and goals.    Goals:   LTG's (8 weeks):  1)   Demonstrate improved elbow active extension to -25 degrees to increase functional hand use for ADLs.  - Met 2/16  2)   Demonstrate 20 lbs of right  strength to facilitate independence with activities of daily living  - Ongoing   3)   Decrease complaints of pain to  2 out of 10 at worst to increase functional hand use for  ADL/work/leisure activities.  - Met 2/16  4)   Patient to score at 40% or less on FOTO to demonstrate improved perception of functional RUE use.  - Ongoing   5) Demonstrate no greater than .5 cm difference in edema of Right MPs and elbow crease compared to unaffected side.  - Ongoing      STG's (4 weeks)  1)   Patient to be IND with HEP and modalities for pain/edema managment.  - Ongoing   2)   Demonstrate -35 degrees of active elbow extension to increase functional hand use for ADLs/work/leisure activities.  Met 2/1  3) Demonstrate no greater than 1.0 cm difference in edema of Right MPs and elbow crease compared to unaffected side.  - Ongoing   4)   Decrease complaints of pain to  4 out of 10 at worst to increase functional hand use for ADL/work/leisure activities.  - Ongoing        Plan     Continue to progress as tolerated.  Updates/Grading for next session: Progress with strengthening and weightbearing as tolerated, such as wall push ups or table top weightbearing onto sponge, continue with scar management and elbow/wrist/hand A/PROM      Svitlana Garcia OTR/L

## 2023-02-17 ENCOUNTER — CLINICAL SUPPORT (OUTPATIENT)
Dept: REHABILITATION | Facility: HOSPITAL | Age: 83
End: 2023-02-17
Payer: MEDICARE

## 2023-02-17 DIAGNOSIS — R68.89 DECREASED FUNCTIONAL ACTIVITY TOLERANCE: ICD-10-CM

## 2023-02-17 DIAGNOSIS — R29.898 RIGHT ARM WEAKNESS: ICD-10-CM

## 2023-02-17 DIAGNOSIS — M25.621 STIFFNESS OF RIGHT UPPER ARM JOINT: Primary | ICD-10-CM

## 2023-02-17 PROCEDURE — 97110 THERAPEUTIC EXERCISES: CPT

## 2023-02-17 PROCEDURE — 97140 MANUAL THERAPY 1/> REGIONS: CPT

## 2023-02-17 NOTE — PROGRESS NOTES
"  Occupational Therapy Daily Treatment Note     Name: Diego TIRADO [facility] Number: [id number]    Therapy Diagnosis:   Encounter Diagnoses   Name Primary?    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, right   G56.21 (ICD-10-CM) - Cubital tunnel syndrome on right   Z98.890 (ICD-10-CM) - Postoperative state      Evaluation Date: 1/27/2023  Plan of Care Certification Date: 04/21/23  Authorization Period: 1/27/23 - 2/24/23  Surgery Date and Procedure: 01/11/23  Right wrist Guyons decompression, carpal tunnel release and cubital tunnel release at the elbow   Date of Return to MD: 02/28/23  FOTO Completion: 1/30/23    Visit #: 6 / 11    Time In: 10:00 am  Time Out: 11:00 am  Total Billable Time:  60 minutes    Precautions:  Standard      Subjective     Pt reports: "Things are going good in therapy. I do a lot of movement with my hand. I've been using the yellow putty at home."   he was compliant with home exercise program given last session.   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     Wound Assessment:   Incisions fully closed, no signs of infection  Size: incision length about 2 inches at elbow, and 1 inch at volar wrist  Location: Right medial elbow, volar wrist    Scar about elbow is supple, able to  and roll  Scar about volar wrist is thickened, limited mobility     Edema: Circumferential " measurements: In centimters       Left  1/27/2023 Right   1/27/2023   MPs 20.8 cm 22.5 cm   Proximal Palmar Crease    NT  NT   Proximal Wrist Crease 16.6 cm 17.2 cm        Left   1/27/2023 Right   1/27/2023   3 Inches distal to elbow crease 23.5 cm 23 cm   Elbow crease 24.7 cm 27.4 cm   3 Inches proximal to elbow crease NT NT         Shoulder AROM: Measured in degrees.    Left  1/27/2023 Right  1/27/2023   Extension/Flexion WFL ~70 flexion   Abduction WFL ~70   IR/ER WFL ~60 ER         Elbow and Wrist ROM. Measured in degrees.    Left  1/27/2023 Right  1/27/2023 Right   2/1/23 Right   2/2 Right:  2/6/23 Right:  2/13/23 Right   2/16   Elbow Ext/Flex WNL -45/135  -35 PROM Extension:    -30 AROM  -20 PROM Extension:    Pre tx: -30  After heat, PROM: -15   Extension:    Pre tx: -25  Post tx: -20    PROM -20 Extension:    Pre: -30  Post: -30    PROM: -20 Extension     After heat:  -25   Supination/Pronation 75/75 75/70        Wrist Ext/Flex 65/70 55/35        Wrist RD/UD 15/35 15/20              Hand ROM. Measured in degrees.     Pt unable to achieve full extension of Right hand, secondary to PIP contractures of the RF/SF, and not able to touch DPC with digits but demonstrates WFL for flexion of all digits    Left  1/27/2023 Right  1/27/2023   Thumb: MP                    IP           Rad ADD/ABD           Pal ADD/ABD              Opposition            Dist to DPC 0 cm     4.5 cm        Sensation: Ulnar Nerve and Median Distribution   2/17/2023 2/17/2023    Left Right   Monofilament Testing     Normal 1.65-2.83     Diminished Light Touch 3.22-3.61  3.61 on median nerve   Diminished Protective 3.84-4.31  4.31 on ulnar nerve   Loss of Protective 4.56-6.65     Untestable >6.65       STRENGTH: (Measured in pounds using a Dynamometer and pinch meter)   Right Left    2/17/2023 2/17/2023    Setting 2 25, 25, 25 65, 65, 70    Average 25 67   Key 4 17   3 Pt NT due to inadequate contact of IF with pinch meter d/t  amputation NT   Tip 4 14           Limitation/Restriction for FOTO Initial Survey     Therapist reviewed FOTO scores for Diego Gunter on 1/27/2023.   FOTO documents entered into EPIC - see Media section.     Limitation Score: 50              Treatment   Dieog received the following supervised modalities after being cleared for contraindications for 10 minutes:   - Hot pack applied to the Right Elbow and biceps with elbow in extension over wedge, forearm in supination (additional hot pack around right hand) to increase blood flow and circulation, and tissue extensibility, and for a low load elbow extension stretch to be included with therex time    Supervised modalities not done supine on plinth today due to mat being used by another patient.       Diego received the following manual therapy techniques for 15 minutes:   - STM with IASTM tool to the volar wrist and elbow incision, biceps muscle and forearm musculature (NT)  - Gentle sustained stretching into elbow extension  - Gentle isolated and composite wrist ext stretch  -Scar massage to surgery sites    Diego performed therapeutic exercises for 45 minutes including:  -low load prolonged stretch during MHP x 10 ( See above)  - Supine on plinth: bilateral chest press with 2 lb dowel, 2 x10  - Supine on plinth: bilateral shoulder flexion with 2 lb dowel,  2 x10  - Supine on plinth: overhead triceps extension with 2 kg yellow ball, 2 x 10  - AROM elbow flex/ext in pro and sup position, sup/pro, wrist ext/flex (x10 reps each)  - Towel slides on table top to facilitate elbow extension/flex (x1 min)  - Yellow theraputty  and lateral pinches - instructed to add to HEP, performing 3-5 min, 1x/day  - Yellow theraband triceps extension  2 x20      NT: Applied two KT I bands to volar wrist incision in X pattern for spatial correction technique      Home Exercises and Education Provided     Education provided:   - Instructed to remove KT with soap and warm water  if skin irritation occurs  - Pt education on safety due to diminished protective sensation throughout ulnar n. distribution  - Reviewed HEP to include AROM elbow, forearm, wrist and supine elbow extension stretch, blue sponge squeezes, and addition of yellow therpautty tasks  - Progress towards goals     Written Home Exercises Provided: Patient instructed to cont prior HEP.  Exercises were reviewed and Diego was able to demonstrate them prior to the end of the session.  Diego demonstrated good  understanding of the HEP provided.     See EMR under Patient Instructions for exercises provided prior visit.        Assessment     Diego tolerated all treatment tasks well with no complaints of pain. During elbow extension exercises, noted significant scapular dysfunction, which is limiting functional use of UE. Notified patient and wife, who stated is new since injury.  Pt. to be screened for PT for shoulder/scapular girdle therapy.    Diego is progressing well towards his goals and there are no updates to goals at this time. Pt prognosis is Fair.     Pt will continue to benefit from skilled outpatient occupational therapy to address the deficits listed in the problem list on initial evaluation provide pt/family education and to maximize pt's level of independence in the home and community environment.     Anticipated barriers to occupational therapy: PLOF, PMH/comorbidities    Pt's spiritual, cultural and educational needs considered and pt agreeable to plan of care and goals.    Goals:   LTG's (8 weeks):  1)   Demonstrate improved elbow active extension to -25 degrees to increase functional hand use for ADLs.  - Met 2/16  2)   Demonstrate 20 lbs of right  strength to facilitate independence with activities of daily living  - Ongoing   3)   Decrease complaints of pain to  2 out of 10 at worst to increase functional hand use for ADL/work/leisure activities.  - Met 2/16  4)   Patient to score at 40% or less on FOTO  to demonstrate improved perception of functional RUE use.  - Ongoing   5) Demonstrate no greater than .5 cm difference in edema of Right MPs and elbow crease compared to unaffected side.  - Ongoing      STG's (4 weeks)  1)   Patient to be IND with HEP and modalities for pain/edema managment.  - Ongoing   2)   Demonstrate -35 degrees of active elbow extension to increase functional hand use for ADLs/work/leisure activities.  Met 2/1  3) Demonstrate no greater than 1.0 cm difference in edema of Right MPs and elbow crease compared to unaffected side.  - Ongoing   4)   Decrease complaints of pain to  4 out of 10 at worst to increase functional hand use for ADL/work/leisure activities.  - Ongoing        Plan     Continue to progress as tolerated.  Updates/Grading for next session: Progress with strengthening and weightbearing as tolerated, such as wall push ups or table top weightbearing onto sponge, continue with scar management and elbow/wrist/hand A/PROM      Tg Catalan, OTR/L, CHT

## 2023-02-22 ENCOUNTER — CLINICAL SUPPORT (OUTPATIENT)
Dept: REHABILITATION | Facility: HOSPITAL | Age: 83
End: 2023-02-22
Payer: MEDICARE

## 2023-02-22 DIAGNOSIS — R68.89 DECREASED FUNCTIONAL ACTIVITY TOLERANCE: ICD-10-CM

## 2023-02-22 DIAGNOSIS — R29.898 RIGHT ARM WEAKNESS: ICD-10-CM

## 2023-02-22 DIAGNOSIS — M25.621 STIFFNESS OF RIGHT UPPER ARM JOINT: Primary | ICD-10-CM

## 2023-02-22 PROCEDURE — 97110 THERAPEUTIC EXERCISES: CPT

## 2023-02-22 PROCEDURE — 97140 MANUAL THERAPY 1/> REGIONS: CPT

## 2023-02-22 NOTE — PROGRESS NOTES
"  Occupational Therapy Daily Treatment Note     Name: Diego Gunter  [facility] Number: [id number]    Therapy Diagnosis:   Encounter Diagnoses   Name Primary?    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, right   G56.21 (ICD-10-CM) - Cubital tunnel syndrome on right   Z98.890 (ICD-10-CM) - Postoperative state      Evaluation Date: 1/27/2023  Plan of Care Certification Date: 04/21/23  Authorization Period: 1/27/23 - 2/24/23  Surgery Date and Procedure: 01/11/23  Right wrist Guyons decompression, carpal tunnel release and cubital tunnel release at the elbow   Date of Return to MD: 02/28/23  FOTO Completion: 1/30/23    Visit #: 7 / 11    Time In: 10:00 am  Time Out: 11:00 am  Total Billable Time:  60 minutes    Precautions:  Standard      Subjective     Pt reports: "Things are going good in therapy. You ladies are doing good things for me."   he was compliant with home exercise program given last session.   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     Wound Assessment:   Incisions fully closed, no signs of infection  Size: incision length about 2 inches at elbow, and 1 inch at volar wrist  Location: Right medial elbow, volar wrist    Scar about elbow is supple, able to  and roll  Scar about volar wrist is thickened, limited mobility     Edema: Circumferential measurements: In centimters       " Left  1/27/2023 Right   1/27/2023   MPs 20.8 cm 22.5 cm   Proximal Palmar Crease    NT  NT   Proximal Wrist Crease 16.6 cm 17.2 cm        Left   1/27/2023 Right   1/27/2023   3 Inches distal to elbow crease 23.5 cm 23 cm   Elbow crease 24.7 cm 27.4 cm   3 Inches proximal to elbow crease NT NT         Shoulder AROM: Measured in degrees.    Left  1/27/2023 Right  1/27/2023   Extension/Flexion WFL ~70 flexion   Abduction WFL ~70   IR/ER WFL ~60 ER         Elbow and Wrist ROM. Measured in degrees.    Left  1/27/2023 Right  1/27/2023 Right   2/1/23 Right   2/2 Right:  2/6/23 Right:  2/13/23 Right   2/16 Right   Elbow Ext/Flex WNL -45/135  -35 PROM Extension:    -30 AROM  -20 PROM Extension:    Pre tx: -30  After heat, PROM: -15   Extension:    Pre tx: -25  Post tx: -20    PROM -20 Extension:    Pre: -30  Post: -30    PROM: -20 Extension     After heat:  -25 PROM    -22 extension   Supination/Pronation 75/75 75/70         Wrist Ext/Flex 65/70 55/35         Wrist RD/UD 15/35 15/20               Hand ROM. Measured in degrees.     Pt unable to achieve full extension of Right hand, secondary to PIP contractures of the RF/SF, and not able to touch DPC with digits but demonstrates WFL for flexion of all digits    Left  1/27/2023 Right  1/27/2023   Thumb: MP                    IP           Rad ADD/ABD           Pal ADD/ABD              Opposition            Dist to DPC 0 cm     4.5 cm        Sensation: Ulnar Nerve and Median Distribution   2/22/2023 2/22/2023    Left Right   Monofilament Testing     Normal 1.65-2.83     Diminished Light Touch 3.22-3.61  3.61 on median nerve   Diminished Protective 3.84-4.31  4.31 on ulnar nerve   Loss of Protective 4.56-6.65     Untestable >6.65       STRENGTH: (Measured in pounds using a Dynamometer and pinch meter)   Right Left    2/22/2023 2/22/2023    Setting 2 25, 25, 25 65, 65, 70    Average 25 67   Key 4 17   3 Pt NT due to inadequate contact of IF with pinch meter d/t amputation  NT   Tip 4 14           Limitation/Restriction for FOTO Initial Survey     Therapist reviewed FOTO scores for Diego Gunter on 1/27/2023.   FOTO documents entered into EPIC - see Media section.     Limitation Score: 50              Treatment   Diego received the following supervised modalities after being cleared for contraindications for 10 minutes:   - Hot pack applied to the Right Elbow and biceps with elbow in extension over wedge, forearm in supination (additional hot pack around right hand) to increase blood flow and circulation, and tissue extensibility, and for a low load elbow extension stretch to be included with therex time    Supervised modalities not done supine on plinth today due to mat being used by another patient.       Diego received the following manual therapy techniques for 15 minutes:   - STM with IASTM tool to the volar wrist and elbow incision, biceps muscle and forearm musculature (NT)  - Gentle sustained stretching into elbow extension  - Gentle isolated and composite wrist ext stretch  -Scar massage to surgery sites    Diego performed therapeutic exercises for 45 minutes including:  -low load prolonged stretch during MHP x 10 ( See above)  - Supine on plinth: bilateral chest press with 2 lb dowel, 2 x10  - Supine on plinth: bilateral shoulder flexion with 2 lb dowel,  2 x10  - Supine on plinth: overhead triceps extension with 2 kg yellow ball, 2 x 10  - AROM elbow flex/ext in pro and sup position, sup/pro, wrist ext/flex (x10 reps each)  - Towel slides on table top to facilitate elbow extension/flex (x1 min)  - Yellow theraputty  and lateral pinches - instructed to add to HEP, performing 3-5 min, 1x/day  - Yellow theraband triceps extension  2 x20      NT: Applied two KT I bands to volar wrist incision in X pattern for spatial correction technique      Home Exercises and Education Provided     Education provided:   - Instructed to remove KT with soap and warm water if skin  irritation occurs  - Pt education on safety due to diminished protective sensation throughout ulnar n. distribution  - Reviewed HEP to include AROM elbow, forearm, wrist and supine elbow extension stretch, blue sponge squeezes, and addition of yellow therpautty tasks  - Progress towards goals     Written Home Exercises Provided: Patient instructed to cont prior HEP.  Exercises were reviewed and Diego was able to demonstrate them prior to the end of the session.  Diego demonstrated good  understanding of the HEP provided.     See EMR under Patient Instructions for exercises provided prior visit.        Assessment     Diego tolerated all treatment tasks well with no complaints of pain. PROM of elbow WFL comparison to pre surgery AROM.    Diego is progressing well towards his goals and there are no updates to goals at this time. Pt prognosis is Fair.     Pt will continue to benefit from skilled outpatient occupational therapy to address the deficits listed in the problem list on initial evaluation provide pt/family education and to maximize pt's level of independence in the home and community environment.     Anticipated barriers to occupational therapy: PLOF, PMH/comorbidities    Pt's spiritual, cultural and educational needs considered and pt agreeable to plan of care and goals.    Goals:   LTG's (8 weeks):  1)   Demonstrate improved elbow active extension to -25 degrees to increase functional hand use for ADLs.  - Met 2/16  2)   Demonstrate 20 lbs of right  strength to facilitate independence with activities of daily living  - Ongoing   3)   Decrease complaints of pain to  2 out of 10 at worst to increase functional hand use for ADL/work/leisure activities.  - Met 2/16  4)   Patient to score at 40% or less on FOTO to demonstrate improved perception of functional RUE use.  - Ongoing   5) Demonstrate no greater than .5 cm difference in edema of Right MPs and elbow crease compared to unaffected side.  - Ongoing       STG's (4 weeks)  1)   Patient to be IND with HEP and modalities for pain/edema managment.  - Ongoing   2)   Demonstrate -35 degrees of active elbow extension to increase functional hand use for ADLs/work/leisure activities.  Met 2/1  3) Demonstrate no greater than 1.0 cm difference in edema of Right MPs and elbow crease compared to unaffected side.  - Ongoing   4)   Decrease complaints of pain to  4 out of 10 at worst to increase functional hand use for ADL/work/leisure activities.  - Ongoing        Plan     Continue to progress as tolerated.  Updates/Grading for next session: Primary therapist to measure elbow AROM and assess UN function.      Tg Catalan, OTR/L, CHT

## 2023-02-23 ENCOUNTER — CLINICAL SUPPORT (OUTPATIENT)
Dept: REHABILITATION | Facility: HOSPITAL | Age: 83
End: 2023-02-23
Payer: MEDICARE

## 2023-02-23 DIAGNOSIS — R29.898 RIGHT ARM WEAKNESS: ICD-10-CM

## 2023-02-23 DIAGNOSIS — R68.89 DECREASED FUNCTIONAL ACTIVITY TOLERANCE: ICD-10-CM

## 2023-02-23 DIAGNOSIS — M25.621 STIFFNESS OF RIGHT UPPER ARM JOINT: Primary | ICD-10-CM

## 2023-02-23 PROCEDURE — 97110 THERAPEUTIC EXERCISES: CPT

## 2023-02-23 PROCEDURE — 97140 MANUAL THERAPY 1/> REGIONS: CPT

## 2023-02-23 NOTE — PROGRESS NOTES
"  Occupational Therapy Daily Treatment Note     Name: Diego Gunter  [facility] Number: [id number]    Therapy Diagnosis:   Encounter Diagnoses   Name Primary?    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, right   G56.21 (ICD-10-CM) - Cubital tunnel syndrome on right   Z98.890 (ICD-10-CM) - Postoperative state      Evaluation Date: 1/27/2023  Plan of Care Certification Date: 04/21/23  Authorization Period: 1/27/23 - 2/24/23  Surgery Date and Procedure: 01/11/23  Right wrist Guyons decompression, carpal tunnel release and cubital tunnel release at the elbow   Date of Return to MD: 02/28/23  FOTO Completion: 1/30/23    Visit #: 9 / 11    Time In: 10:00 am  Time Out: 10:55 am  Total Billable Time:  45 minutes    Precautions:  Standard      Subjective     Pt reports: "I was cutting grass the other day. The elbow hurts at times but I feel most limited by my hand."   he was compliant with home exercise program given last session.   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     Wound Assessment:   Incisions fully closed, no signs of infection  Size: incision length about 2 inches at elbow, and 1 inch at volar wrist  Location: Right medial elbow, volar wrist    Scar about elbow is supple, able to  and roll  Scar about volar wrist is thickened, limited mobility     Edema: Circumferential measurements: In " centimters       Left  1/27/2023 Right   1/27/2023   MPs 20.8 cm 22.5 cm   Proximal Palmar Crease    NT  NT   Proximal Wrist Crease 16.6 cm 17.2 cm        Left   1/27/2023 Right   1/27/2023   3 Inches distal to elbow crease 23.5 cm 23 cm   Elbow crease 24.7 cm 27.4 cm   3 Inches proximal to elbow crease NT NT         Shoulder AROM: Measured in degrees.    Left  1/27/2023 Right  1/27/2023   Extension/Flexion WFL ~70 flexion   Abduction WFL ~70   IR/ER WFL ~60 ER         Elbow and Wrist ROM. Measured in degrees.    Left  1/27/2023 Right  1/27/2023 Right   2/1/23 Right   2/2 Right:  2/6/23 Right:  2/13/23 Right   2/16 Right   Elbow Ext/Flex WNL -45/135  -35 PROM Extension:    -30 AROM  -20 PROM Extension:    Pre tx: -30  After heat, PROM: -15   Extension:    Pre tx: -25  Post tx: -20    PROM -20 Extension:    Pre: -30  Post: -30    PROM: -20 Extension     After heat:  -25 PROM    -22 extension   Supination/Pronation 75/75 75/70         Wrist Ext/Flex 65/70 55/35         Wrist RD/UD 15/35 15/20               Hand ROM. Measured in degrees.     Pt unable to achieve full extension of Right hand, secondary to PIP contractures of the RF/SF, and not able to touch DPC with digits but demonstrates WFL for flexion of all digits    Left  1/27/2023 Right  1/27/2023   Thumb: MP                    IP           Rad ADD/ABD           Pal ADD/ABD              Opposition            Dist to DPC 0 cm     4.5 cm        Sensation: Ulnar Nerve and Median Distribution   2/23/2023 2/23/2023    Left Right   Monofilament Testing     Normal 1.65-2.83     Diminished Light Touch 3.22-3.61  3.61 on median nerve   Diminished Protective 3.84-4.31  4.31 on ulnar nerve   Loss of Protective 4.56-6.65     Untestable >6.65       STRENGTH: (Measured in pounds using a Dynamometer and pinch meter)   Right Left    2/23/2023 2/23/2023    Setting 2 25, 25, 25 65, 65, 70    Average 25 67   Key 4 17   3 Pt NT due to inadequate contact of IF with pinch  meter d/t amputation NT   Tip 4 14     Special Testing/MMT:  Lumbricals: Observed full AROM bilaterally, but difficulty to decipher true lumbrical activation in the Right hand due to PIP contractures of digits 3-5  Interossei: Limited AROM observed for ab/adduction of digits 2-5 on the Right hand compared to left side  MMT 1st dorsal interossei: 2- / 5 (palpable muscle contraction and limited AROM, unable to take any resistance)  Froment's sign: positive on the Right        Limitation/Restriction for FOTO Initial Survey     Therapist reviewed FOTO scores for Diegonaomi Gunter on 1/27/2023.   FOTO documents entered into [employer] - see Media section.     Limitation Score: 50              Treatment   Diego received the following supervised modalities after being cleared for contraindications for 10 minutes:   - Hot pack applied to the Right hand to increase blood flow and circulation, and tissue extensibility    Diego received the following manual therapy techniques for 10 minutes:   - STM with IASTM tool and cupping to the volar wrist scar and forearm musculature  - Gentle isolated and composite wrist ext stretch    Diego performed therapeutic exercises for 35 minutes including:  - Assessment of AROM and MMT for ulnarly innervated muscle of hands bilaterally  - AROM elbow flex/ext in pro and sup position, sup/pro, wrist ext/flex (x10 reps each)  - Towel slides on table top to facilitate elbow extension/flex (x1 min)  - Yellow theraputty  and lateral pinches (2 x 20)  - Yellow theraputty ab/adduction, intrinsic plus pinches - required verbal and tactile cueing (1 x 20)      Not performed today:  - Supine on plinth: bilateral chest press with 2 lb dowel, 2 x10  - Supine on plinth: bilateral shoulder flexion with 2 lb dowel,  2 x10  - Supine on plinth: overhead triceps extension with 2 kg yellow ball, 2 x 10  - Yellow theraband triceps extension  2 x20  - Applied two KT I bands to volar wrist incision in X pattern  for spatial correction technique      Home Exercises and Education Provided     Education provided:   - Pt education on safety due to diminished protective sensation throughout ulnar n. distribution  - Reviewed HEP to include AROM elbow, forearm, wrist and supine elbow extension stretch, blue sponge squeezes, and addition of yellow therpautty tasks  - Progress towards goals     Written Home Exercises Provided: Patient instructed to cont prior HEP.  Exercises were reviewed and Diego was able to demonstrate them prior to the end of the session.  Diego demonstrated good  understanding of the HEP provided.     See EMR under Patient Instructions for exercises provided prior visit.        Assessment     Diego tolerated all treatment tasks well with no complaints of pain. He continues to present with significant scapular dysfunction, which is limiting functional use of UE. Continue with plan to be screened for PT for shoulder/scapular girdle therapy. Assessment of ulnarly innervated muscles reveals weakness and limited ROM. He will benefit from exercises targeting these muscles and reassessment over time.    Diego is progressing well towards his goals and there are no updates to goals at this time. Pt prognosis is Fair.     Pt will continue to benefit from skilled outpatient occupational therapy to address the deficits listed in the problem list on initial evaluation provide pt/family education and to maximize pt's level of independence in the home and community environment.     Anticipated barriers to occupational therapy: PLOF, PMH/comorbidities    Pt's spiritual, cultural and educational needs considered and pt agreeable to plan of care and goals.    Goals:   LTG's (8 weeks):  1)   Demonstrate improved elbow active extension to -25 degrees to increase functional hand use for ADLs.  - Met 2/16  2)   Demonstrate 20 lbs of right  strength to facilitate independence with activities of daily living  - Ongoing   3)    Decrease complaints of pain to  2 out of 10 at worst to increase functional hand use for ADL/work/leisure activities.  - Met 2/16  4)   Patient to score at 40% or less on FOTO to demonstrate improved perception of functional RUE use.  - Ongoing   5) Demonstrate no greater than .5 cm difference in edema of Right MPs and elbow crease compared to unaffected side.  - Ongoing      STG's (4 weeks)  1)   Patient to be IND with HEP and modalities for pain/edema managment.  - Ongoing   2)   Demonstrate -35 degrees of active elbow extension to increase functional hand use for ADLs/work/leisure activities.  Met 2/1  3) Demonstrate no greater than 1.0 cm difference in edema of Right MPs and elbow crease compared to unaffected side.  - Ongoing   4)   Decrease complaints of pain to  4 out of 10 at worst to increase functional hand use for ADL/work/leisure activities.  - Ongoing        Plan     Continue to progress as tolerated.  Updates/Grading for next session: Progress with strengthening and weightbearing as tolerated, such as wall push ups, wrist curls      Destiney Hankins OTR/L

## 2023-02-23 NOTE — PATIENT INSTRUCTIONS
"[facility]  HOME EXERCISE PROGRAM     Complete the following strengthening program 1x/day.        /Squeezes  - Squeeze putty, gripping for a max of 3 - 5 min            Three Point Pinches  - Roll putty into a log  - Pinch along with the thumb, index and middle fingers.  - Make sure to keep an "O"          Resisted Lumbrical   Bending only at large knuckles, press putty down against thumb.   As if your hand is "quacking" like a duck.  Keep fingertips straight.           Abduction  - Place a ring of putty (or a rubberband) around all four fingers  - Spread fingers apart against the putty  - Can also do this with a rubberband instead!         Adduction  - Place putty between fingers  - Squeeze the fingers together, into the putty      JOEL Miranda/KVNG       "

## 2023-02-24 ENCOUNTER — CLINICAL SUPPORT (OUTPATIENT)
Dept: REHABILITATION | Facility: HOSPITAL | Age: 83
End: 2023-02-24
Payer: MEDICARE

## 2023-02-24 DIAGNOSIS — R29.898 RIGHT ARM WEAKNESS: ICD-10-CM

## 2023-02-24 DIAGNOSIS — R68.89 DECREASED FUNCTIONAL ACTIVITY TOLERANCE: ICD-10-CM

## 2023-02-24 DIAGNOSIS — M25.621 STIFFNESS OF RIGHT UPPER ARM JOINT: Primary | ICD-10-CM

## 2023-02-24 LAB
ACID FAST MOD KINY STN SPEC: ABNORMAL
ACID FAST MOD KINY STN SPEC: ABNORMAL
MYCOBACTERIUM SPEC QL CULT: ABNORMAL

## 2023-02-24 PROCEDURE — 97110 THERAPEUTIC EXERCISES: CPT

## 2023-02-24 PROCEDURE — 97140 MANUAL THERAPY 1/> REGIONS: CPT

## 2023-02-24 NOTE — PROGRESS NOTES
"  Occupational Therapy Daily Treatment Note     Name: Diego Gunter  [facility] Number: [id number]    Therapy Diagnosis:   Encounter Diagnoses   Name Primary?    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, right   G56.21 (ICD-10-CM) - Cubital tunnel syndrome on right   Z98.890 (ICD-10-CM) - Postoperative state      Evaluation Date: 1/27/2023  Plan of Care Certification Date: 04/21/23  Authorization Period: 1/27/23 - 2/24/23  Surgery Date and Procedure: 01/11/23  Right wrist Guyons decompression, carpal tunnel release and cubital tunnel release at the elbow   Date of Return to MD: 02/28/23  FOTO Completion: 1/30/23    Visit #: 10 / 11    Time In: 10:05 am  Time Out: 11:00 am  Total Billable Time:  45 minutes    Precautions:  Standard      Subjective     Pt reports: "I was cutting grass the other day. The elbow hurts at times but I feel most limited by my hand."   he was compliant with home exercise program given last session.   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     Wound Assessment:   Incisions fully closed, no signs of infection  Size: incision length about 2 inches at elbow, and 1 inch at volar wrist  Location: Right medial elbow, volar wrist    Scar about elbow is supple, able to  and roll  Scar about volar wrist is thickened, limited mobility     Edema: Circumferential measurements: In " centimters       Left  1/27/2023 Right   1/27/2023   MPs 20.8 cm 22.5 cm   Proximal Palmar Crease    NT  NT   Proximal Wrist Crease 16.6 cm 17.2 cm        Left   1/27/2023 Right   1/27/2023   3 Inches distal to elbow crease 23.5 cm 23 cm   Elbow crease 24.7 cm 27.4 cm   3 Inches proximal to elbow crease NT NT         Shoulder AROM: Measured in degrees.    Left  1/27/2023 Right  1/27/2023   Extension/Flexion WFL ~70 flexion   Abduction WFL ~70   IR/ER WFL ~60 ER         Elbow and Wrist ROM. Measured in degrees.    Left  1/27/2023 Right  1/27/2023 Right   2/1/23 Right   2/2 Right:  2/6/23 Right:  2/13/23 Right   2/16 Right Right  2/24/23   Elbow Ext/Flex WNL -45/135  -35 PROM Extension:    -30 AROM  -20 PROM Extension:    Pre tx: -30  After heat, PROM: -15   Extension:    Pre tx: -25  Post tx: -20    PROM -20 Extension:    Pre: -30  Post: -30    PROM: -20 Extension     After heat:  -25 PROM    -22 extension AROM -30  PROM -30    Post tx:  AAROM -20   Supination/Pronation 75/75 75/70          Wrist Ext/Flex 65/70 55/35       50/45   Wrist RD/UD 15/35 15/20                Hand ROM. Measured in degrees.     Pt unable to achieve full extension of Right hand, secondary to PIP contractures of the RF/SF, and not able to touch DPC with digits but demonstrates WFL for flexion of all digits    Left  1/27/2023 Right  1/27/2023 Right  2/24/23   Thumb: MP                     IP            Rad ADD/ABD            Pal ADD/ABD               Opposition             Dist to DPC 0 cm     4.5 cm 4.0 cm        Sensation: Ulnar Nerve and Median Distribution   2/24/2023 2/24/2023    Left Right   Monofilament Testing     Normal 1.65-2.83     Diminished Light Touch 3.22-3.61  3.61 on median nerve   Diminished Protective 3.84-4.31  4.31 on ulnar nerve   Loss of Protective 4.56-6.65     Untestable >6.65       STRENGTH: (Measured in pounds using a Dynamometer and pinch meter)   Right Left    2/24/2023 2/24/2023    Setting 2 25, 25, 25 65, 65,  70    Average 25 67   Key 4 17   3 Pt NT due to inadequate contact of IF with pinch meter d/t amputation NT   Tip 4 14     Special Testing/MMT:  Lumbricals: Observed full AROM bilaterally, but difficulty to decipher true lumbrical activation in the Right hand due to 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   FOTO documents entered into [employer] - see Media section.     Limitation Score: 50              Treatment   Diego received the following supervised modalities after being cleared for contraindications for 10 minutes:   - Hot pack applied to the Right hand and elbow to increase blood flow and circulation, and tissue extensibility    Diego received the following manual therapy techniques for 15 minutes:   - STM with IASTM tool to volar wrist scar, forearm musculature, and biceps  - Gentle isolated and composite wrist ext stretch  - Gentle sustained stretching into elbow extension    Diego performed therapeutic exercises for 30 minutes including:  - Reassessment of ROM  - Supplied with Mepitec and instructed to wear for scar management  - AROM elbow flex/ext in pro and sup position, sup/pro, wrist ext/flex (x10 reps each)  - Towel slides on table top to facilitate elbow extension/flex (x2 min)  - Yellow theraputty  and lateral pinches (2 x 20)  - Yellow theraputty ab/adduction, intrinsic plus pinches - required verbal and tactile cueing to avoid substitution (1 x 20)  - Wrist curls flex/ext with 2 lb dumbbell (2 x 10)      Not performed today:  - Supine on plinth: bilateral chest press with 2 lb dowel, 2 x10  - Supine on plinth: bilateral  shoulder flexion with 2 lb dowel,  2 x10  - Supine on plinth: overhead triceps extension with 2 kg yellow ball, 2 x 10  - Yellow theraband triceps extension  2 x20  - Applied two KT I bands to volar wrist incision in X pattern for spatial correction technique      Home Exercises and Education Provided     Education provided:   - Pt education on safety due to diminished protective sensation throughout ulnar n. distribution  - Reviewed HEP to include AROM elbow, forearm, wrist and supine elbow extension stretch, blue sponge squeezes, and addition of yellow therpautty tasks  - Progress towards goals     Written Home Exercises Provided: Patient instructed to cont prior HEP.  Exercises were reviewed and Diego was able to demonstrate them prior to the end of the session.  Diego demonstrated good  understanding of the HEP provided.     See EMR under Patient Instructions for exercises provided prior visit.        Assessment     Diego tolerated all treatment tasks well with no complaints of pain. He continues to present with significant scapular dysfunction, which is limiting functional use of UE. Continue with plan to be screened for PT for shoulder/scapular girdle therapy. He demonstrates improving elbow ROM post treatment tasks but consisently to -20 extension and not beyond. His scar in the volar wrist remains thickened with poor mobility and will benefit from Mepitac which was provided today.    Assessment of ulnarly innervated muscles reveals weakness and limited ROM. He will benefit from exercises targeting these muscles and reassessment over time.    Diego is progressing well towards his goals and there are no updates to goals at this time. Pt prognosis is Fair.     Pt will continue to benefit from skilled outpatient occupational therapy to address the deficits listed in the problem list on initial evaluation provide pt/family education and to maximize pt's level of independence in the home and community  environment.     Anticipated barriers to occupational therapy: PLOF, PMH/comorbidities    Pt's spiritual, cultural and educational needs considered and pt agreeable to plan of care and goals.    Goals:   LTG's (8 weeks):  1)   Demonstrate improved elbow active extension to -25 degrees to increase functional hand use for ADLs.  - Met 2/16  2)   Demonstrate 20 lbs of right  strength to facilitate independence with activities of daily living  - Ongoing   3)   Decrease complaints of pain to  2 out of 10 at worst to increase functional hand use for ADL/work/leisure activities.  - Met 2/16  4)   Patient to score at 40% or less on FOTO to demonstrate improved perception of functional RUE use.  - Ongoing   5) Demonstrate no greater than .5 cm difference in edema of Right MPs and elbow crease compared to unaffected side.  - Ongoing      STG's (4 weeks)  1)   Patient to be IND with HEP and modalities for pain/edema managment.  - Ongoing   2)   Demonstrate -35 degrees of active elbow extension to increase functional hand use for ADLs/work/leisure activities.  Met 2/1  3) Demonstrate no greater than 1.0 cm difference in edema of Right MPs and elbow crease compared to unaffected side.  - Ongoing   4)   Decrease complaints of pain to  4 out of 10 at worst to increase functional hand use for ADL/work/leisure activities.  - Ongoing        Plan     Continue to progress as tolerated.  Updates/Grading for next session: Progress with strengthening and weightbearing as tolerated, such as wall push ups, wrist curls      Destiney Hankins OTR/KVNG

## 2023-02-28 ENCOUNTER — OFFICE VISIT (OUTPATIENT)
Dept: ORTHOPEDICS | Facility: CLINIC | Age: 83
End: 2023-02-28
Payer: MEDICARE

## 2023-02-28 DIAGNOSIS — G56.21 GUYON SYNDROME, RIGHT: ICD-10-CM

## 2023-02-28 DIAGNOSIS — G56.21 CUBITAL TUNNEL SYNDROME ON RIGHT: ICD-10-CM

## 2023-02-28 DIAGNOSIS — Z98.890 POSTOPERATIVE STATE: ICD-10-CM

## 2023-02-28 DIAGNOSIS — G56.01 CARPAL TUNNEL SYNDROME ON RIGHT: Primary | ICD-10-CM

## 2023-02-28 PROBLEM — M54.6 ACUTE THORACIC BACK PAIN: Status: RESOLVED | Noted: 2022-04-28 | Resolved: 2023-02-28

## 2023-02-28 PROBLEM — E78.49 OTHER HYPERLIPIDEMIA: Status: ACTIVE | Noted: 2022-07-20

## 2023-02-28 PROBLEM — R53.1 RIGHT SIDED WEAKNESS: Status: RESOLVED | Noted: 2022-08-11 | Resolved: 2023-02-28

## 2023-02-28 PROBLEM — R29.898 RIGHT ARM WEAKNESS: Status: RESOLVED | Noted: 2023-01-27 | Resolved: 2023-02-28

## 2023-02-28 PROCEDURE — 1126F PR PAIN SEVERITY QUANTIFIED, NO PAIN PRESENT: ICD-10-PCS | Mod: CPTII,S$GLB,, | Performed by: PHYSICIAN ASSISTANT

## 2023-02-28 PROCEDURE — 3288F FALL RISK ASSESSMENT DOCD: CPT | Mod: CPTII,S$GLB,, | Performed by: PHYSICIAN ASSISTANT

## 2023-02-28 PROCEDURE — 1126F AMNT PAIN NOTED NONE PRSNT: CPT | Mod: CPTII,S$GLB,, | Performed by: PHYSICIAN ASSISTANT

## 2023-02-28 PROCEDURE — 1157F ADVNC CARE PLAN IN RCRD: CPT | Mod: CPTII,S$GLB,, | Performed by: PHYSICIAN ASSISTANT

## 2023-02-28 PROCEDURE — 3288F PR FALLS RISK ASSESSMENT DOCUMENTED: ICD-10-PCS | Mod: CPTII,S$GLB,, | Performed by: PHYSICIAN ASSISTANT

## 2023-02-28 PROCEDURE — 1101F PT FALLS ASSESS-DOCD LE1/YR: CPT | Mod: CPTII,S$GLB,, | Performed by: PHYSICIAN ASSISTANT

## 2023-02-28 PROCEDURE — 1157F PR ADVANCE CARE PLAN OR EQUIV PRESENT IN MEDICAL RECORD: ICD-10-PCS | Mod: CPTII,S$GLB,, | Performed by: PHYSICIAN ASSISTANT

## 2023-02-28 PROCEDURE — 99999 PR PBB SHADOW E&M-EST. PATIENT-LVL II: ICD-10-PCS | Mod: PBBFAC,,, | Performed by: PHYSICIAN ASSISTANT

## 2023-02-28 PROCEDURE — 1101F PR PT FALLS ASSESS DOC 0-1 FALLS W/OUT INJ PAST YR: ICD-10-PCS | Mod: CPTII,S$GLB,, | Performed by: PHYSICIAN ASSISTANT

## 2023-02-28 PROCEDURE — 99024 PR POST-OP FOLLOW-UP VISIT: ICD-10-PCS | Mod: S$GLB,,, | Performed by: PHYSICIAN ASSISTANT

## 2023-02-28 PROCEDURE — 1159F MED LIST DOCD IN RCRD: CPT | Mod: CPTII,S$GLB,, | Performed by: PHYSICIAN ASSISTANT

## 2023-02-28 PROCEDURE — 1159F PR MEDICATION LIST DOCUMENTED IN MEDICAL RECORD: ICD-10-PCS | Mod: CPTII,S$GLB,, | Performed by: PHYSICIAN ASSISTANT

## 2023-02-28 PROCEDURE — 99999 PR PBB SHADOW E&M-EST. PATIENT-LVL II: CPT | Mod: PBBFAC,,, | Performed by: PHYSICIAN ASSISTANT

## 2023-02-28 PROCEDURE — 99024 POSTOP FOLLOW-UP VISIT: CPT | Mod: S$GLB,,, | Performed by: PHYSICIAN ASSISTANT

## 2023-02-28 NOTE — PROGRESS NOTES
Dr. Lazo is the supervising physician for this encounter/patient    Diego Gunter presents for post-operative evaluation.  The patient is now 7 weeks s/p Right wrist Guyons decompression, carpal tunnel release and cubital tunnel release at the elbow with Dr. Lazo on 1/11/23.  Overall the patient reports doing well.  He reports 0/10 pain, denies any f/c/s and tells me all of his numbness has resolved. He is not taking any medications for this. He is working with OT.    PE:    AA&O x 4.  NAD  HEENT:  NCAT, sclera nonicteric  Lungs:  Respirations are equal and unlabored.  CV:  2+ bilateral upper and lower extremity pulses.  MSK: The wounds are well healed without any signs of infection.  Near full hand motion present, wrist motion present without pain. Elbow extension decreased by about 45 degrees, full flexion. SILT. DNVI.    A/P: Status post above, doing well  1) OT ordered for postop rehab progress per protocol.  2) I will get him scheduled with General Ortho for right shoulder evaluation per request of the family.  3) F/U as needed.  4) Call with any questions/concerns in the interim      Jamie Russo-DiGeorge PA-C

## 2023-03-01 ENCOUNTER — OFFICE VISIT (OUTPATIENT)
Dept: INFECTIOUS DISEASES | Facility: CLINIC | Age: 83
End: 2023-03-01
Payer: MEDICARE

## 2023-03-01 VITALS
DIASTOLIC BLOOD PRESSURE: 73 MMHG | WEIGHT: 158.94 LBS | HEIGHT: 69 IN | BODY MASS INDEX: 23.54 KG/M2 | SYSTOLIC BLOOD PRESSURE: 119 MMHG | HEART RATE: 60 BPM | OXYGEN SATURATION: 99 %

## 2023-03-01 DIAGNOSIS — H90.0 CONDUCTIVE HEARING LOSS, BILATERAL: ICD-10-CM

## 2023-03-01 DIAGNOSIS — A31.0 MYCOBACTERIUM AVIUM-INTRACELLULARE COMPLEX: Primary | ICD-10-CM

## 2023-03-01 PROCEDURE — 1157F PR ADVANCE CARE PLAN OR EQUIV PRESENT IN MEDICAL RECORD: ICD-10-PCS | Mod: CPTII,S$GLB,, | Performed by: INTERNAL MEDICINE

## 2023-03-01 PROCEDURE — 3074F PR MOST RECENT SYSTOLIC BLOOD PRESSURE < 130 MM HG: ICD-10-PCS | Mod: CPTII,S$GLB,, | Performed by: INTERNAL MEDICINE

## 2023-03-01 PROCEDURE — 99214 PR OFFICE/OUTPT VISIT, EST, LEVL IV, 30-39 MIN: ICD-10-PCS | Mod: S$GLB,,, | Performed by: INTERNAL MEDICINE

## 2023-03-01 PROCEDURE — 1101F PT FALLS ASSESS-DOCD LE1/YR: CPT | Mod: CPTII,S$GLB,, | Performed by: INTERNAL MEDICINE

## 2023-03-01 PROCEDURE — 1157F ADVNC CARE PLAN IN RCRD: CPT | Mod: CPTII,S$GLB,, | Performed by: INTERNAL MEDICINE

## 2023-03-01 PROCEDURE — 3078F PR MOST RECENT DIASTOLIC BLOOD PRESSURE < 80 MM HG: ICD-10-PCS | Mod: CPTII,S$GLB,, | Performed by: INTERNAL MEDICINE

## 2023-03-01 PROCEDURE — 3288F PR FALLS RISK ASSESSMENT DOCUMENTED: ICD-10-PCS | Mod: CPTII,S$GLB,, | Performed by: INTERNAL MEDICINE

## 2023-03-01 PROCEDURE — 1126F PR PAIN SEVERITY QUANTIFIED, NO PAIN PRESENT: ICD-10-PCS | Mod: CPTII,S$GLB,, | Performed by: INTERNAL MEDICINE

## 2023-03-01 PROCEDURE — 99214 OFFICE O/P EST MOD 30 MIN: CPT | Mod: S$GLB,,, | Performed by: INTERNAL MEDICINE

## 2023-03-01 PROCEDURE — 1159F MED LIST DOCD IN RCRD: CPT | Mod: CPTII,S$GLB,, | Performed by: INTERNAL MEDICINE

## 2023-03-01 PROCEDURE — 1126F AMNT PAIN NOTED NONE PRSNT: CPT | Mod: CPTII,S$GLB,, | Performed by: INTERNAL MEDICINE

## 2023-03-01 PROCEDURE — 3074F SYST BP LT 130 MM HG: CPT | Mod: CPTII,S$GLB,, | Performed by: INTERNAL MEDICINE

## 2023-03-01 PROCEDURE — 99999 PR PBB SHADOW E&M-EST. PATIENT-LVL IV: CPT | Mod: PBBFAC,,,

## 2023-03-01 PROCEDURE — 3288F FALL RISK ASSESSMENT DOCD: CPT | Mod: CPTII,S$GLB,, | Performed by: INTERNAL MEDICINE

## 2023-03-01 PROCEDURE — 1159F PR MEDICATION LIST DOCUMENTED IN MEDICAL RECORD: ICD-10-PCS | Mod: CPTII,S$GLB,, | Performed by: INTERNAL MEDICINE

## 2023-03-01 PROCEDURE — 1101F PR PT FALLS ASSESS DOC 0-1 FALLS W/OUT INJ PAST YR: ICD-10-PCS | Mod: CPTII,S$GLB,, | Performed by: INTERNAL MEDICINE

## 2023-03-01 PROCEDURE — 3078F DIAST BP <80 MM HG: CPT | Mod: CPTII,S$GLB,, | Performed by: INTERNAL MEDICINE

## 2023-03-01 PROCEDURE — 99499 RISK ADDL DX/OHS AUDIT: ICD-10-PCS | Mod: S$GLB,,, | Performed by: INTERNAL MEDICINE

## 2023-03-01 PROCEDURE — 99999 PR PBB SHADOW E&M-EST. PATIENT-LVL IV: ICD-10-PCS | Mod: PBBFAC,,,

## 2023-03-01 PROCEDURE — 99499 UNLISTED E&M SERVICE: CPT | Mod: S$GLB,,, | Performed by: INTERNAL MEDICINE

## 2023-03-01 RX ORDER — ALBUTEROL SULFATE 90 UG/1
2 AEROSOL, METERED RESPIRATORY (INHALATION) DAILY PRN
Qty: 18 G | Refills: 11 | Status: SHIPPED | OUTPATIENT
Start: 2023-03-01 | End: 2024-03-23 | Stop reason: SDUPTHER

## 2023-03-01 NOTE — PROGRESS NOTES
INFECTIOUS DISEASE CLINIC  03/01/2023 10:23 AM    Subjective:      Chief Complaint:   Pulmonary MAC follow up     History of Present Illness:    Patient Diego Gunter is a 82 y.o. male  with h/o emphysema and cavitary MAC infection  seen in clinic for MAC f/u. Seen by Dr Moscoso 12/2022, Dr Colon 10/22. I haven't seen him since 6/2022.     Since last appt [place] [facility] for Epidural hematoma- presented with R sided weakness found to have c3-6 fluid collection s/p spinal decompression and fluid evacuation with NSY 4/28. Path c/w clot/thrombus. Anticoagulation stopped. Reports mobility has improved following rehab    Reports compliance with azithro/ethambutol and aerobika and inhaled hypertonic saline, which he has been on > 1 year  More energy  Still cough. No more hemoptysis. Some brown sputum  Reports compliance with abx. Denies side effects          Organism     MYCOBACTERIUM AVIUM   Antibiotic                   TED (mcg/mL)  Interpretation   ----------------------------------------------------------   Clofazimine                          0.12   Moxifloxacin                            4       R   Clarithromycin                          2       S   Amikacin (IV)                           8       S   Amikacin (liposomal, inhaled)           8       S   Linezolid                              32       R          Labs 1/13/22- normal lft     Takes prilosec for reflux.     Quit smoking 57 y ago.     Had bronch 10/2018 for work up of lung mass     BRONCHIAL WASH CYTOLOGY:  NEGATIVE EXAM-NO NEOPLASIA IDENTIFIED  NO ORGANISMS IDENTIFIED WITH A GMS STAIN  THE CONTROL STAINED APPROPRIATEL        Organism     MYCOBACTERIUM AVIUM COMPLEX   Antibiotic    TED (mcg/mL)  Interpretation   ----------------------------------------------------------   Moxifloxacin             2       I   Clarithromycin           1       S   Amikacin                64   Streptomycin           >64   Linezolid                64       R   Ethambutol              16   Rifampin                 8   Rifabutin           <=0.25         AFB cultures 10/7/21, 10/28/21, 11/18/21, 12/9/21 all positive MAC     CT 11/2021  1. Continued evolution of previously described right upper lobe cavitary lesion measuring approximately 3.5 x 4.3 x 5.7-cm (previously 3.3 x 4.4 x 5.1-cm). Similar to minimal interval decrease in the amount of surrounding wall thickness, consolidation and surrounding scattered small consolidative multifocal airspace opacities.  Mediastinal lymphadenopathy, not significantly changed.  2. Symmetric moderate-severe centrilobular emphysematous changes with basoapical gradient, not significantly changed.      Review of Symptoms:  Constitutional: Denies fevers, chills, or weakness.  ENT: Denies dysphagia, nasal discharge, ear pain or discharge.  Cardiovascular: Denies chest pain, palpitations, orthopnea, or claudication.  Respiratory: Denies shortness of breath, cough, hemoptysis, or wheezing.  GI: Denies nausea/vomitting, hematochezia, melena, abd pain, or changes in appetite.  : Denies dysuria, incontinence, or hematuria.  Musculoskeletal: Denies joint pain or myalgias.  Skin/breast: Denies rashes, lumps, lesions, or discharge.  Neurologic: Denies headache, dizziness, vertigo, or paresthesias.    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 Dr. Pham    Hx of colonic polyps     followed by GI. Dr. Pham    Hyperlipidemia associated with type 2 diabetes mellitus     Hyperlipidemia LDL goal <100      Hypernatremia 5/5/2022    Hypertension associated with type 2 diabetes mellitus     Hypertension associated with type 2 diabetes mellitus     Hypotension     Intermittent confusion 4/29/2022    Overweight(278.02)     Prostate cancer 09/2011    followed by urology, Dr. Rogers    Pulmonary embolism     Reducible right inguinal hernia 10/10/2022    Right hemiparesis 7/22/2022    Secondary to cervical hematoma    Type II or unspecified type diabetes mellitus without mention of complication, not stated as uncontrolled     diet controlled    Urinary retention 4/29/2022       Past Surgical History:   Procedure Laterality Date    BRONCHOSCOPY N/A 10/15/2018    Procedure: BRONCHOSCOPY;  Surgeon: Pratibha Diagnostic Provider;  Location: [facility] OR [address] [place] [address];  Service: Anesthesiology;  Laterality: N/A;    CARPAL TUNNEL RELEASE Right 1/11/2023    Procedure: RELEASE, CARPAL TUNNEL;  Surgeon: Romero Lazo MD;  Location: [facility];  Service: Orthopedics;  Laterality: Right;    CATARACT EXTRACTION, BILATERAL  2014    DECOMPRESSION, NERVE, ULNAR Right 1/11/2023    Procedure: DECOMPRESSION, NERVE, ULNAR - right cub jolie and guyon's decompression as well as open right carpal tunnel release;  Surgeon: Romero Lazo MD;  Location: [facility];  Service: Orthopedics;  Laterality: Right;    POSTERIOR CERVICAL LAMINECTOMY Bilateral 4/28/2022    Procedure: LAMINECTOMY, SPINE, CERVICAL, POSTERIOR APPROACH C3-6;  Surgeon: Carlos Miller MD;  Location: [facility] OR [address] [place] [address];  Service: Neurosurgery;  Laterality: Bilateral;    PROSTATECTOMY      REPAIR, HERNIA, INGUINAL, WITHOUT HISTORY OF PRIOR REPAIR, AGE 5 YEARS OR OLDER Right 10/10/2022    Procedure: REPAIR, HERNIA, INGUINAL, WITHOUT HISTORY OF PRIOR REPAIR, AGE 5 YEARS OR OLDER;  Surgeon: Dario Esquivel MD;  Location: [facility] OR [address] [place] [address];  Service: General;  Laterality: Right;  open right inguinal hernia repair with mesh       Family History   Problem Relation Age of Onset    Colon cancer Mother      Diabetes Mother     Heart disease Father     Mental illness Sister     Diabetes Sister     Other Brother         polio as a child    Pulmonary fibrosis Brother     Diabetes Brother     Myasthenia gravis Brother     Parkinsonism Brother     Diabetes Brother     Dementia Brother     Lung cancer Brother         smoker    Diabetes Maternal Aunt     Diabetes Other     Esophageal cancer Neg Hx        Social History     Socioeconomic History    Marital status:      Spouse name: Addis    Number of children: 2   Occupational History    Occupation: tool    Tobacco Use    Smoking status: Former     Packs/day: 0.25     Years: 5.00     Pack years: 1.25     Types: Cigarettes     Quit date: 10/2/1962     Years since quittin.4    Smokeless tobacco: Former    Tobacco comments:     quit age 23   Substance and Sexual Activity    Alcohol use: Not Currently    Drug use: No    Sexual activity: Yes     Partners: Female     Social Determinants of Health     Financial Resource Strain: Low Risk     Difficulty of Paying Living Expenses: Not hard at all   Food Insecurity: No Food Insecurity    Worried About Running Out of Food in the Last Year: Never true    Ran Out of Food in the Last Year: Never true   Transportation Needs: No Transportation Needs    Lack of Transportation (Medical): No    Lack of Transportation (Non-Medical): No   Physical Activity: Insufficiently Active    Days of Exercise per Week: 5 days    Minutes of Exercise per Session: 10 min   Stress: No Stress Concern Present    Feeling of Stress : Not at all   Social Connections: Moderately Isolated    Frequency of Communication with Friends and Family: More than three times a week    Frequency of Social Gatherings with Friends and Family: Once a week    Attends Sikh Services: Never    Active Member of Clubs or Organizations: No    Attends Club or Organization Meetings: Never    Marital Status:    Housing Stability: Low Risk     Unable to Pay  for Housing in the Last Year: No    Number of Places Lived in the Last Year: 1    Unstable Housing in the Last Year: No       Review of patient's allergies indicates:  No Known Allergies      Objective:   VS (24h):   Vitals:    03/01/23 1108   BP: 119/73   Pulse: 60     [unfilled]  General: Afebrile, alert, comfortable, no acute distress.   HEENT: TERELL. EOMI, no scleral icterus.   Pulmonary: Non labored,clear to auscultation A/P/L. No wheezing, crackles, or rhonchi.  Cardiac: normal S1 & S2 w/o rubs/murmurs/gallops.   Abdominal: Non-tender, non-distended.  Extremities: Moves all extremities x 4. No peripheral edema.  Skin: No jaundice, rashes, or visible lesions.   Neurological:  Alert and oriented x 4.     Labs:    Glucose   Date Value Ref Range Status   10/21/2022 106 70 - 110 mg/dL Final   09/19/2022 106 70 - 110 mg/dL Final   09/16/2022 88 70 - 110 mg/dL Final       Calcium   Date Value Ref Range Status   10/21/2022 9.5 8.7 - 10.5 mg/dL Final   09/19/2022 9.9 8.7 - 10.5 mg/dL Final   09/16/2022 9.6 8.7 - 10.5 mg/dL Final       Albumin   Date Value Ref Range Status   10/21/2022 4.0 3.5 - 5.2 g/dL Final   09/19/2022 4.4 3.5 - 5.2 g/dL Final   09/16/2022 4.1 3.5 - 5.2 g/dL Final       Total Protein   Date Value Ref Range Status   10/21/2022 7.4 6.0 - 8.4 g/dL Final   09/19/2022 7.6 6.0 - 8.4 g/dL Final   09/16/2022 7.0 6.0 - 8.4 g/dL Final       Sodium   Date Value Ref Range Status   10/21/2022 142 136 - 145 mmol/L Final   09/19/2022 138 136 - 145 mmol/L Final   09/16/2022 141 136 - 145 mmol/L Final       Potassium   Date Value Ref Range Status   10/21/2022 3.9 3.5 - 5.1 mmol/L Final   09/19/2022 4.1 3.5 - 5.1 mmol/L Final   09/16/2022 4.2 3.5 - 5.1 mmol/L Final       CO2   Date Value Ref Range Status   10/21/2022 24 23 - 29 mmol/L Final   09/19/2022 26 23 - 29 mmol/L Final   09/16/2022 25 23 - 29 mmol/L Final       Chloride   Date Value Ref Range Status   10/21/2022 106 95 - 110 mmol/L Final   09/19/2022 102  95 - 110 mmol/L Final   09/16/2022 107 95 - 110 mmol/L Final       BUN   Date Value Ref Range Status   10/21/2022 11 8 - 23 mg/dL Final   09/19/2022 14 8 - 23 mg/dL Final   09/16/2022 9 8 - 23 mg/dL Final       Creatinine   Date Value Ref Range Status   10/21/2022 1.1 0.5 - 1.4 mg/dL Final   09/19/2022 0.9 0.5 - 1.4 mg/dL Final   09/16/2022 1.0 0.5 - 1.4 mg/dL Final       Alkaline Phosphatase   Date Value Ref Range Status   10/21/2022 65 55 - 135 U/L Final   09/19/2022 74 55 - 135 U/L Final   09/16/2022 57 55 - 135 U/L Final       ALT   Date Value Ref Range Status   10/21/2022 14 10 - 44 U/L Final   09/19/2022 23 10 - 44 U/L Final   09/16/2022 20 10 - 44 U/L Final       AST   Date Value Ref Range Status   10/21/2022 21 10 - 40 U/L Final   09/19/2022 22 10 - 40 U/L Final   09/16/2022 23 10 - 40 U/L Final       Total Bilirubin   Date Value Ref Range Status   10/21/2022 0.8 0.1 - 1.0 mg/dL Final     Comment:     For infants and newborns, interpretation of results should be based  on gestational age, weight and in agreement with clinical  observations.    Premature Infant recommended reference ranges:  Up to 24 hours.............<8.0 mg/dL  Up to 48 hours............<12.0 mg/dL  3-5 days..................<15.0 mg/dL  6-29 days.................<15.0 mg/dL     09/19/2022 0.4 0.1 - 1.0 mg/dL Final     Comment:     For infants and newborns, interpretation of results should be based  on gestational age, weight and in agreement with clinical  observations.    Premature Infant recommended reference ranges:  Up to 24 hours.............<8.0 mg/dL  Up to 48 hours............<12.0 mg/dL  3-5 days..................<15.0 mg/dL  6-29 days.................<15.0 mg/dL     09/16/2022 0.4 0.1 - 1.0 mg/dL Final     Comment:     For infants and newborns, interpretation of results should be based  on gestational age, weight and in agreement with clinical  observations.    Premature Infant recommended reference ranges:  Up to 24  hours.............<8.0 mg/dL  Up to 48 hours............<12.0 mg/dL  3-5 days..................<15.0 mg/dL  6-29 days.................<15.0 mg/dL         WBC   Date Value Ref Range Status   11/04/2022 6.00 3.90 - 12.70 K/uL Final   10/21/2022 6.65 3.90 - 12.70 K/uL Final   09/19/2022 10.41 3.90 - 12.70 K/uL Final       Hemoglobin   Date Value Ref Range Status   11/04/2022 10.1 (L) 14.0 - 18.0 g/dL Final   10/21/2022 9.7 (L) 14.0 - 18.0 g/dL Final   09/19/2022 13.3 (L) 14.0 - 18.0 g/dL Final       POC Hematocrit   Date Value Ref Range Status   04/28/2022 34 (L) 36 - 54 %PCV Final   04/28/2022 42 36 - 54 %PCV Final     Hematocrit   Date Value Ref Range Status   11/04/2022 31.2 (L) 40.0 - 54.0 % Final   10/21/2022 30.0 (L) 40.0 - 54.0 % Final   09/19/2022 40.3 40.0 - 54.0 % Final       MCV   Date Value Ref Range Status   11/04/2022 86 82 - 98 fL Final   10/21/2022 84 82 - 98 fL Final   09/19/2022 81 (L) 82 - 98 fL Final       Platelets   Date Value Ref Range Status   11/04/2022 340 150 - 450 K/uL Final   10/21/2022 396 150 - 450 K/uL Final   09/19/2022 275 150 - 450 K/uL Final       Lab Results   Component Value Date    CHOL 116 (L) 04/28/2022    CHOL 116 (L) 03/07/2022    CHOL 127 02/25/2021       Lab Results   Component Value Date    HDL 36 (L) 04/28/2022    HDL 43 03/07/2022    HDL 41 02/25/2021       Lab Results   Component Value Date    LDLCALC 62.4 (L) 04/28/2022    LDLCALC 54.4 (L) 03/07/2022    LDLCALC 67.4 02/25/2021       Lab Results   Component Value Date    TRIG 88 04/28/2022    TRIG 93 03/07/2022    TRIG 93 02/25/2021       Lab Results   Component Value Date    CHOLHDL 31.0 04/28/2022    CHOLHDL 37.1 03/07/2022    CHOLHDL 32.3 02/25/2021       RPR   Date Value Ref Range Status   05/08/2022 Non-reactive Non-reactive Final   10/21/2019 Non-reactive Non-reactive Final     No results found for: QUANTIFERON    Medications:  Current Outpatient Medications on File Prior to Visit   Medication Sig Dispense Refill     aspirin (ECOTRIN) 81 MG EC tablet Take 81 mg by mouth once daily.      azelastine (ASTELIN) 137 mcg (0.1 %) nasal spray 2 sprays (274 mcg total) by Nasal route 2 (two) times daily. 30 mL 0    azithromycin (ZITHROMAX) 500 MG tablet Take 500 mg by mouth once daily.      b complex vitamins capsule Take 1 capsule by mouth once daily.      ethambutoL (MYAMBUTOL) 400 MG Tab Take 2 tablets (800 mg total) by mouth once daily. 60 tablet 5    nebulizer and compressor ([employer] COMPRESSOR SYSTEM) Marcy use to administer nebulized medication as directed 1 each 0    pravastatin (PRAVACHOL) 10 MG tablet ONE TABLET BY MOUTH once a day 90 tablet 0    sodium chloride 3% 3 % nebulizer solution USE 4 ML VIAL IN NEBULIZER  TWICE DAILY 480 mL 11    sodium chloride 7% 7 % nebulizer solution use one vial in nebulizer twice a day      [DISCONTINUED] omega-3 fatty acids 1,000 mg Cap Take 2 g by mouth.       No current facility-administered medications on file prior to visit.       Antibiotics:   Antibiotics (From admission, onward)      None            HIV: No components found for: HIV 1/2 AG/AB  Hepatitis C IgG: No components found for: HEPATITIS C  Syphilis:   RPR   Date Value Ref Range Status   05/08/2022 Non-reactive Non-reactive Final   10/21/2019 Non-reactive Non-reactive Final       Hepatitis A IgG: No components found for: HEPATITIS A IGG  Hepatitis Bc IgG: No components found for: HEPATITIS B CORE IGG  Hepatitis Bs IgG:  Quantiferon: No results found for: QUANTIFERON  VZV IgG: No components found for: VARICELLA IGG    No components found for: SEDIMENTATION RATE  No components found for: C-REACTIVE PROTEIN      Microbiology x 7d:   Microbiology Results (last 7 days)       ** No results found for the last 168 hours. **            Immunization History   Administered Date(s) Administered    COVID-19, MRNA, LN-S, PF (Pfizer) (Purple Cap) 01/13/2021, 04/28/2021, 11/05/2021    Influenza 10/19/2022    Influenza (FLUAD) - Trivalent -  Adjuvanted - PF (65+) 09/25/2019    Influenza - High Dose - PF (65 years and older) 09/18/2015, 10/04/2016, 10/23/2017, 09/21/2018    Influenza - Quadrivalent - High Dose - PF (65 years and older) 11/05/2021, 10/18/2022    Influenza - Quadrivalent - PF *Preferred* (6 months and older) 10/06/2014    Influenza A (H1N1) 2009 Monovalent - IM 01/22/2010    Influenza Split 10/10/2011, 10/06/2014    Pneumococcal Conjugate - 13 Valent 10/28/2015    Pneumococcal Polysaccharide - 23 Valent 07/12/2013    Tdap 03/08/2015    Zoster Recombinant 09/25/2019, 12/30/2019         Reviewed records today as well as relevant labs, cultures, and imaging    Assessment:     Pulmonary MAC, macrolide susceptible  Cavitary lung lesion  Nodular liver- noted on CT; US abdomen 10/2021 No compelling sonographic evidence of cirrhosis.  empysema  Antiphospholipid antibody syndrome on coumadin      Meets ATS/IDSA guidelines for treatment- cough, cavitary lung lesion, MAC from bronch sample. AFB cultures 10/7/21, 10/28/21, 11/18/21, 12/9/21 all positive MAC. CT 11/2021 with RUL cavitary lesion-3.5 x 4.3 x 5.7-cm     Risk factor-   structural: emphysema, bronchiectasis  functional: reflux        Qtc 419 5/13/22     No toxicities from antimicrobials  Extremely medically complex  Patient is high risk for infectious complications given medical comorbidities    Plan:     Continue antibiotics- daily ethambutol and azithromycin (started 12/2021). Add inhaled amikacin. Consented for Arikayce today and faxed application. Counseled extensively on side effects. Given baseline hearing issues, refer to audiology for monitoring while on arikayce.     Update CT chest. If any worsening, low threshold for also adding clofazamine (as substitute for rifampin). Counseled on side effects and gave him a copy of the [facility] clofazamine and HIPPa consent to review.     Counseled extensively on antibiotic side effects. While on ethambutol- self vision screens daily. If blurry  vision lasts 2 days in a row, patient known to stop ethambutol and call eye doctor for exam and to let us know that this happened.     Discussed importance of airway clearance. continue Aerobika and nebulized hypertonic saline bid. Recommended decreasing duration of hot showers, avoid soil and water aerosols, use fans in shower area, avoid fine mist shower heads (heavy droplets better), avoid bubbling water (hot tubs, humidifiers), flush water heater frequently to avoid biofilm buildup     Prevent reflux- avoid stomach sleeping. Famotidine/tums. Stop liquids 3hr before bedtime     Monitor afb sputum    - Will monitor drug therapy for toxicity      - The following labs were ordered:    Orders Placed This Encounter   Procedures    AFB Culture & Smear     Standing Status:   Standing     Number of Occurrences:   11     Standing Expiration Date:   4/29/2024    CT Chest Without Contrast     Standing Status:   Future     Standing Expiration Date:   3/1/2024     Order Specific Question:   May the Radiologist modify the order per protocol to meet the clinical needs of the patient?     Answer:   Yes    Comprehensive Metabolic Panel     Standing Status:   Standing     Number of Occurrences:   11     Standing Expiration Date:   4/29/2024    C-reactive protein     Standing Status:   Standing     Number of Occurrences:   11     Standing Expiration Date:   4/29/2024    Ambulatory referral/consult to Audiology     Standing Status:   Standing     Number of Occurrences:   11     Standing Expiration Date:   3/1/2024     Referral Priority:   Routine     Referral Type:   Audiology Exam     Referral Reason:   Specialty Services Required     Requested Specialty:   Audiology     Number of Visits Requested:   11    ECG 12 lead     Standing Status:   Future     Standing Expiration Date:   3/1/2024           I have sent communication to the referring physician and/or primary care provider.     I spent a total of 40 minutes on the day of the  visit. This includes face to face time and non-face to face time preparing to see the patient (eg, review of tests), obtaining and/or reviewing separately obtained history, documenting clinical information in the electronic or other health record, independently interpreting results, and communicating results to the patient/family/caregiver, or care coordination.      Paola Rao MD, MPH  Infectious Disease

## 2023-03-02 ENCOUNTER — PES CALL (OUTPATIENT)
Dept: ADMINISTRATIVE | Facility: CLINIC | Age: 83
End: 2023-03-02
Payer: MEDICARE

## 2023-03-03 ENCOUNTER — CLINICAL SUPPORT (OUTPATIENT)
Dept: REHABILITATION | Facility: HOSPITAL | Age: 83
End: 2023-03-03
Payer: MEDICARE

## 2023-03-03 ENCOUNTER — OFFICE VISIT (OUTPATIENT)
Dept: ORTHOPEDICS | Facility: CLINIC | Age: 83
End: 2023-03-03
Payer: MEDICARE

## 2023-03-03 ENCOUNTER — HOSPITAL ENCOUNTER (OUTPATIENT)
Dept: RADIOLOGY | Facility: HOSPITAL | Age: 83
Discharge: HOME | End: 2023-03-03
Attending: PHYSICIAN ASSISTANT
Payer: MEDICARE

## 2023-03-03 VITALS
HEIGHT: 69 IN | BODY MASS INDEX: 23.6 KG/M2 | SYSTOLIC BLOOD PRESSURE: 132 MMHG | DIASTOLIC BLOOD PRESSURE: 65 MMHG | WEIGHT: 159.38 LBS

## 2023-03-03 DIAGNOSIS — M95.8 WINGING OF SCAPULA: ICD-10-CM

## 2023-03-03 DIAGNOSIS — M25.511 CHRONIC RIGHT SHOULDER PAIN: ICD-10-CM

## 2023-03-03 DIAGNOSIS — M25.621 STIFFNESS OF RIGHT UPPER ARM JOINT: Primary | ICD-10-CM

## 2023-03-03 DIAGNOSIS — R68.89 DECREASED FUNCTIONAL ACTIVITY TOLERANCE: ICD-10-CM

## 2023-03-03 DIAGNOSIS — M75.01 ADHESIVE CAPSULITIS OF RIGHT SHOULDER: Primary | ICD-10-CM

## 2023-03-03 DIAGNOSIS — G89.29 CHRONIC RIGHT SHOULDER PAIN: ICD-10-CM

## 2023-03-03 PROCEDURE — 1160F RVW MEDS BY RX/DR IN RCRD: CPT | Mod: CPTII,S$GLB,, | Performed by: PHYSICIAN ASSISTANT

## 2023-03-03 PROCEDURE — 3078F DIAST BP <80 MM HG: CPT | Mod: CPTII,S$GLB,, | Performed by: PHYSICIAN ASSISTANT

## 2023-03-03 PROCEDURE — 3075F SYST BP GE 130 - 139MM HG: CPT | Mod: CPTII,S$GLB,, | Performed by: PHYSICIAN ASSISTANT

## 2023-03-03 PROCEDURE — 20610 PR DRAIN/INJECT LARGE JOINT/BURSA: ICD-10-PCS | Mod: RT,S$GLB,, | Performed by: PHYSICIAN ASSISTANT

## 2023-03-03 PROCEDURE — 73030 X-RAY EXAM OF SHOULDER: CPT | Mod: TC,RT

## 2023-03-03 PROCEDURE — 73030 X-RAY EXAM OF SHOULDER: CPT | Mod: 26,RT,, | Performed by: RADIOLOGY

## 2023-03-03 PROCEDURE — 97140 MANUAL THERAPY 1/> REGIONS: CPT

## 2023-03-03 PROCEDURE — 99213 OFFICE O/P EST LOW 20 MIN: CPT | Mod: 25,S$GLB,, | Performed by: PHYSICIAN ASSISTANT

## 2023-03-03 PROCEDURE — 20610 DRAIN/INJ JOINT/BURSA W/O US: CPT | Mod: RT,S$GLB,, | Performed by: PHYSICIAN ASSISTANT

## 2023-03-03 PROCEDURE — 1157F ADVNC CARE PLAN IN RCRD: CPT | Mod: CPTII,S$GLB,, | Performed by: PHYSICIAN ASSISTANT

## 2023-03-03 PROCEDURE — 1125F AMNT PAIN NOTED PAIN PRSNT: CPT | Mod: CPTII,S$GLB,, | Performed by: PHYSICIAN ASSISTANT

## 2023-03-03 PROCEDURE — 1101F PT FALLS ASSESS-DOCD LE1/YR: CPT | Mod: CPTII,S$GLB,, | Performed by: PHYSICIAN ASSISTANT

## 2023-03-03 PROCEDURE — 1125F PR PAIN SEVERITY QUANTIFIED, PAIN PRESENT: ICD-10-PCS | Mod: CPTII,S$GLB,, | Performed by: PHYSICIAN ASSISTANT

## 2023-03-03 PROCEDURE — 99999 PR PBB SHADOW E&M-EST. PATIENT-LVL IV: ICD-10-PCS | Mod: PBBFAC,,, | Performed by: PHYSICIAN ASSISTANT

## 2023-03-03 PROCEDURE — 1101F PR PT FALLS ASSESS DOC 0-1 FALLS W/OUT INJ PAST YR: ICD-10-PCS | Mod: CPTII,S$GLB,, | Performed by: PHYSICIAN ASSISTANT

## 2023-03-03 PROCEDURE — 3078F PR MOST RECENT DIASTOLIC BLOOD PRESSURE < 80 MM HG: ICD-10-PCS | Mod: CPTII,S$GLB,, | Performed by: PHYSICIAN ASSISTANT

## 2023-03-03 PROCEDURE — 1159F MED LIST DOCD IN RCRD: CPT | Mod: CPTII,S$GLB,, | Performed by: PHYSICIAN ASSISTANT

## 2023-03-03 PROCEDURE — 1160F PR REVIEW ALL MEDS BY PRESCRIBER/CLIN PHARMACIST DOCUMENTED: ICD-10-PCS | Mod: CPTII,S$GLB,, | Performed by: PHYSICIAN ASSISTANT

## 2023-03-03 PROCEDURE — 97022 WHIRLPOOL THERAPY: CPT

## 2023-03-03 PROCEDURE — 73030 XR SHOULDER COMPLETE 2 OR MORE VIEWS RIGHT: ICD-10-PCS | Mod: 26,RT,, | Performed by: RADIOLOGY

## 2023-03-03 PROCEDURE — 1157F PR ADVANCE CARE PLAN OR EQUIV PRESENT IN MEDICAL RECORD: ICD-10-PCS | Mod: CPTII,S$GLB,, | Performed by: PHYSICIAN ASSISTANT

## 2023-03-03 PROCEDURE — 3288F PR FALLS RISK ASSESSMENT DOCUMENTED: ICD-10-PCS | Mod: CPTII,S$GLB,, | Performed by: PHYSICIAN ASSISTANT

## 2023-03-03 PROCEDURE — 3288F FALL RISK ASSESSMENT DOCD: CPT | Mod: CPTII,S$GLB,, | Performed by: PHYSICIAN ASSISTANT

## 2023-03-03 PROCEDURE — 97110 THERAPEUTIC EXERCISES: CPT

## 2023-03-03 PROCEDURE — 3075F PR MOST RECENT SYSTOLIC BLOOD PRESS GE 130-139MM HG: ICD-10-PCS | Mod: CPTII,S$GLB,, | Performed by: PHYSICIAN ASSISTANT

## 2023-03-03 PROCEDURE — 99213 PR OFFICE/OUTPT VISIT, EST, LEVL III, 20-29 MIN: ICD-10-PCS | Mod: 25,S$GLB,, | Performed by: PHYSICIAN ASSISTANT

## 2023-03-03 PROCEDURE — 1159F PR MEDICATION LIST DOCUMENTED IN MEDICAL RECORD: ICD-10-PCS | Mod: CPTII,S$GLB,, | Performed by: PHYSICIAN ASSISTANT

## 2023-03-03 PROCEDURE — 99999 PR PBB SHADOW E&M-EST. PATIENT-LVL IV: CPT | Mod: PBBFAC,,, | Performed by: PHYSICIAN ASSISTANT

## 2023-03-03 RX ORDER — BETAMETHASONE SODIUM PHOSPHATE AND BETAMETHASONE ACETATE 3; 3 MG/ML; MG/ML
6 INJECTION, SUSPENSION INTRA-ARTICULAR; INTRALESIONAL; INTRAMUSCULAR; SOFT TISSUE
Status: COMPLETED | OUTPATIENT
Start: 2023-03-03 | End: 2023-03-03

## 2023-03-03 RX ADMIN — BETAMETHASONE SODIUM PHOSPHATE AND BETAMETHASONE ACETATE 6 MG: 3; 3 INJECTION, SUSPENSION INTRA-ARTICULAR; INTRALESIONAL; INTRAMUSCULAR; SOFT TISSUE at 04:03

## 2023-03-03 NOTE — PROGRESS NOTES
"  Occupational Therapy Daily Treatment Note     Name: Diego TIRADO [facility] Number: [id number]    Therapy Diagnosis:   Encounter Diagnoses   Name Primary?    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, right   G56.21 (ICD-10-CM) - Cubital tunnel syndrome on right   Z98.890 (ICD-10-CM) - Postoperative state      Evaluation Date: 1/27/2023  Plan of Care Certification Date: 04/21/23  Authorization Period: 1/27/23 - 3/31/23  Surgery Date and Procedure: 01/11/23  Right wrist Guyons decompression, carpal tunnel release and cubital tunnel release at the elbow   Date of Return to MD: 02/28/23  FOTO Completion: 1/30/23    Visit #: 11 / 23    Time In: 12: 30 pm  Time Out: 1:30 pm  Total Billable Time:  60 minutes    Precautions:  Standard      Subjective     Pt reports: "I'm seeing the doctor for my shoulder today."  he was compliant with home exercise program given last session.   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     Wound Assessment:   Incisions fully closed, no signs of infection  Size: incision length about 2 inches at elbow, and 1 inch at volar wrist  Location: Right medial elbow, volar wrist    Scar about elbow is supple, able to  and roll  Scar about volar wrist is thickened, limited mobility     Edema: Circumferential measurements: In centimters       Left  1/27/2023 Right   1/27/2023   MPs 20.8 cm 22.5 cm   Proximal " Palmar Crease    NT  NT   Proximal Wrist Crease 16.6 cm 17.2 cm        Left   1/27/2023 Right   1/27/2023   3 Inches distal to elbow crease 23.5 cm 23 cm   Elbow crease 24.7 cm 27.4 cm   3 Inches proximal to elbow crease NT NT         Shoulder AROM: Measured in degrees.    Left  1/27/2023 Right  1/27/2023   Extension/Flexion WFL ~70 flexion   Abduction WFL ~70   IR/ER WFL ~60 ER         Elbow and Wrist ROM. Measured in degrees.    Left  1/27/2023 Right  1/27/2023 Right   2/1/23 Right   2/2 Right:  2/6/23 Right:  2/13/23 Right   2/16 Right Right  2/24/23   Elbow Ext/Flex WNL -45/135  -35 PROM Extension:    -30 AROM  -20 PROM Extension:    Pre tx: -30  After heat, PROM: -15   Extension:    Pre tx: -25  Post tx: -20    PROM -20 Extension:    Pre: -30  Post: -30    PROM: -20 Extension     After heat:  -25 PROM    -22 extension AROM -30  PROM -30    Post tx:  AAROM -20   Supination/Pronation 75/75 75/70          Wrist Ext/Flex 65/70 55/35       50/45   Wrist RD/UD 15/35 15/20                Hand ROM. Measured in degrees.     Pt unable to achieve full extension of Right hand, secondary to PIP contractures of the RF/SF, and not able to touch DPC with digits but demonstrates WFL for flexion of all digits    Left  1/27/2023 Right  1/27/2023 Right  2/24/23   Thumb: MP                     IP            Rad ADD/ABD            Pal ADD/ABD               Opposition             Dist to DPC 0 cm     4.5 cm 4.0 cm        Sensation: Ulnar Nerve and Median Distribution   3/3/2023 3/3/2023    Left Right   Monofilament Testing     Normal 1.65-2.83     Diminished Light Touch 3.22-3.61  3.61 on median nerve   Diminished Protective 3.84-4.31  4.31 on ulnar nerve   Loss of Protective 4.56-6.65     Untestable >6.65       STRENGTH: (Measured in pounds using a Dynamometer and pinch meter)   Right Left    2/14/23 2/14/23    Setting 2 25, 25, 25 65, 65, 70    Average 25 67   Key 4 17   3 Pt NT due to inadequate contact of IF with pinch  meter d/t amputation NT   Tip 4 14     Special Testing/MMT:  Lumbricals: Observed full AROM bilaterally, but difficulty to decipher true lumbrical activation in the Right hand due to 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   FOTO documents entered into [employer] - see Media section.     Limitation Score: 50              Treatment   Diego received the following supervised modalities after being cleared for contraindications for 10 minutes:   - Fluidotherapy - 115 degrees and 50 speed, to Right hand for 10 minutes for increased blood flow and circulation and increased tissue extensibility.    Diego received the following manual therapy techniques for 10 minutes:   - STM with UrGiftTM tool to volar wrist scar, forearm musculature, and biceps    Diego performed therapeutic exercises for 40 minutes including:  - AROM elbow flex/ext in pro and sup position, sup/pro, wrist ext/flex (x10 reps each)  - Medium pom pom  with ad/abduction  - Translation and placement with pegs  - Red resistive pin for key pinch pom pom   - Red flex bar for wrist flex/ext  - Resistive gripper for pom pom , white spring on 3rd sprung  - Octy (3 min)      Not performed today:  - Supine on plinth: bilateral chest press with 2 lb dowel, 2 x10  - Supine on plinth: bilateral shoulder flexion with 2 lb dowel,  2 x10  - Supine on plinth: overhead triceps extension with 2 kg yellow ball, 2 x 10  - Yellow theraband triceps extension  2 x20  - Towel slides on table top to facilitate elbow extension/flex (x2 min)  - Yellow theraputty  and lateral pinches (2 x 20)  -  Yellow theraputty ab/adduction, intrinsic plus pinches - required verbal and tactile cueing to avoid substitution (1 x 20)  - Wrist curls flex/ext with 2 lb dumbbell (2 x 10)      Home Exercises and Education Provided     Education provided:   - Pt education on safety due to diminished protective sensation throughout ulnar n. distribution  - Reviewed HEP to include AROM elbow, forearm, wrist and supine elbow extension stretch, blue sponge squeezes, and addition of yellow therpautty tasks  - Progress towards goals     Written Home Exercises Provided: Patient instructed to cont prior HEP.  Exercises were reviewed and Diego was able to demonstrate them prior to the end of the session.  Diego demonstrated good  understanding of the HEP provided.     See EMR under Patient Instructions for exercises provided prior visit.        Assessment     Diego tolerated all treatment tasks well with no complaints of pain. He continues to present with significant scapular dysfunction, which is limiting functional use of UE. He is seeing MD today for his shoulder. He tolerated progression of strengthening well.    Diego is progressing well towards his goals and there are no updates to goals at this time. Pt prognosis is Fair.     Pt will continue to benefit from skilled outpatient occupational therapy to address the deficits listed in the problem list on initial evaluation provide pt/family education and to maximize pt's level of independence in the home and community environment.     Anticipated barriers to occupational therapy: PLOF, PMH/comorbidities    Pt's spiritual, cultural and educational needs considered and pt agreeable to plan of care and goals.    Goals:   LTG's (8 weeks):  1)   Demonstrate improved elbow active extension to -25 degrees to increase functional hand use for ADLs.  - Met 2/16  2)   Demonstrate 20 lbs of right  strength to facilitate independence with activities of daily living  - Ongoing   3)    Decrease complaints of pain to  2 out of 10 at worst to increase functional hand use for ADL/work/leisure activities.  - Met 2/16  4)   Patient to score at 40% or less on FOTO to demonstrate improved perception of functional RUE use.  - Ongoing   5) Demonstrate no greater than .5 cm difference in edema of Right MPs and elbow crease compared to unaffected side.  - Ongoing      STG's (4 weeks)  1)   Patient to be IND with HEP and modalities for pain/edema managment.  - Ongoing   2)   Demonstrate -35 degrees of active elbow extension to increase functional hand use for ADLs/work/leisure activities.  Met 2/1  3) Demonstrate no greater than 1.0 cm difference in edema of Right MPs and elbow crease compared to unaffected side.  - Ongoing   4)   Decrease complaints of pain to  4 out of 10 at worst to increase functional hand use for ADL/work/leisure activities.  - Ongoing        Plan     Continue to progress as tolerated.  Updates/Grading for next session: Progress with strengthening and weightbearing as tolerated, such as wall push ups, wrist curls      Destiney Hankins OTR/L

## 2023-03-03 NOTE — PROGRESS NOTES
SUBJECTIVE:     Chief Complaint & History of Present Illness:  Diego Gunter is a  New  patient 82 y.o. male who is seen here today with a complaint of    Chief Complaint   Patient presents with    Right Shoulder - Pain    .  Patient is here today for evaluation treatment of decreasing range of motion increased pain of the right shoulder following extended hospital stay secondary to epidural hematoma and subsequent surgeries.  Patient is currently undergoing physical therapy for the right upper extremity secondary to carpal tunnel release and cubital tunnel release is progressing very well.  He is having pain soreness radiating up towards the shoulder and decreasing range of motion consistent with adhesive capsulitis.  He also has developed a winging scapula  On a scale of 1-10, with 10 being worst pain imaginable, he rates this pain as 3 on good days and 7 on bad days.  he describes the pain as sore and achy.    Review of patient's allergies indicates:  No Known Allergies      Current Outpatient Medications   Medication Sig Dispense Refill    albuterol (VENTOLIN HFA) 90 mcg/actuation inhaler Inhale 2 puffs into the lungs daily as needed (30 min before arikayce). Rescue 18 g 11    aspirin (ECOTRIN) 81 MG EC tablet Take 81 mg by mouth once daily.      azelastine (ASTELIN) 137 mcg (0.1 %) nasal spray 2 sprays (274 mcg total) by Nasal route 2 (two) times daily. 30 mL 0    azithromycin (ZITHROMAX) 500 MG tablet Take 500 mg by mouth once daily.      b complex vitamins capsule Take 1 capsule by mouth once daily.      ethambutoL (MYAMBUTOL) 400 MG Tab Take 2 tablets (800 mg total) by mouth once daily. 60 tablet 5    nebulizer and compressor (Nelbee COMPRESSOR SYSTEM) Marcy use to administer nebulized medication as directed 1 each 0    pravastatin (PRAVACHOL) 10 MG tablet ONE TABLET BY MOUTH once a day 90 tablet 0    sodium chloride 3% 3 % nebulizer solution USE 4 ML VIAL IN NEBULIZER  TWICE DAILY 480 mL 11    sodium  chloride 7% 7 % nebulizer solution use one vial in nebulizer twice a day       No current facility-administered medications for this visit.       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 Dr. Pham    Hx of colonic polyps     followed by GI. Dr. Pham    Hyperlipidemia associated with type 2 diabetes mellitus     Hyperlipidemia LDL goal <100     Hypernatremia 5/5/2022    Hypertension associated with type 2 diabetes mellitus     Hypertension associated with type 2 diabetes mellitus     Hypotension     Intermittent confusion 4/29/2022    Overweight(278.02)     Prostate cancer 09/2011    followed by urology, Dr. Rogers    Pulmonary embolism     Reducible right inguinal hernia 10/10/2022    Right hemiparesis 7/22/2022    Secondary to cervical hematoma    Type II or unspecified type diabetes mellitus without mention of complication, not stated as uncontrolled     diet controlled    Urinary retention 4/29/2022       Past Surgical History:   Procedure Laterality Date    BRONCHOSCOPY N/A 10/15/2018    Procedure: BRONCHOSCOPY;  Surgeon: Pratibha Diagnostic Provider;  Location: [facility] OR [address] [place] [address];  Service: Anesthesiology;  Laterality: N/A;    CARPAL TUNNEL RELEASE Right 1/11/2023    Procedure: RELEASE, CARPAL TUNNEL;  Surgeon: Romero Lazo MD;  Location: [facility];  Service: Orthopedics;  Laterality: Right;    CATARACT EXTRACTION, BILATERAL  2014    DECOMPRESSION, NERVE, ULNAR Right 1/11/2023    Procedure: DECOMPRESSION, NERVE, ULNAR - right cub jolie and guyon's decompression as well as open right carpal  tunnel release;  Surgeon: Romero Lazo MD;  Location: [facility];  Service: Orthopedics;  Laterality: Right;    POSTERIOR CERVICAL LAMINECTOMY Bilateral 4/28/2022    Procedure: LAMINECTOMY, SPINE, CERVICAL, POSTERIOR APPROACH C3-6;  Surgeon: Carlos Miller MD;  Location: [address];  Service: Neurosurgery;  Laterality: Bilateral;    PROSTATECTOMY      REPAIR, HERNIA, INGUINAL, WITHOUT HISTORY OF PRIOR REPAIR, AGE 5 YEARS OR OLDER Right 10/10/2022    Procedure: REPAIR, HERNIA, INGUINAL, WITHOUT HISTORY OF PRIOR REPAIR, AGE 5 YEARS OR OLDER;  Surgeon: Dario Esquivel MD;  Location: [facility] OR [address] [place] [address];  Service: General;  Laterality: Right;  open right inguinal hernia repair with mesh       Vital Signs (Most Recent)  Vitals:    03/03/23 1539   BP: 132/65       Review of Systems:  ROS:  Constitutional: no fever or chills  Eyes: no visual changes  ENT: no nasal congestion or sore throat, positive neurosensory hearing loss  Respiratory: no cough or shortness of breath, pulmonary hypertension, centrilobular emphysema, cavitary lung disease  Cardiovascular: no chest pain or palpitations, bradycardia, aortic atherosclerosis  Gastrointestinal: no nausea or vomiting, tolerating diet, positive dysphagia, cholelithiasis  Genitourinary: no hematuria or dysuria, positive prostate cancer, anemia  Integument/Breast: no rash or pruritis  Hematologic/Lymphatic: no easy bruising or lymphadenopathy, positive pulmonary embolism, hypercoagulability state, antiphospholipid syndrome  Musculoskeletal: no arthralgias or myalgias  Neurological: no seizures or tremors, status post laminectomy spinal fusion, mild cognitive impairment, epidural hematoma, cubital tunnel syndrome of the right cognitive communication deficit, carpal tunnel syndrome of the right, MCA stroke  Behavioral/Psych: no auditory or visual hallucinations, positive for anxiety  Endocrine: no heat or cold intolerance, positive diabetes type 2      OBJECTIVE:     PHYSICAL  "EXAM:  Height: 5' 9" (175.3 cm) Weight: 72.3 kg (159 lb 6.3 oz), General Appearance: Well nourished, well developed, in no acute distress.  Neurological: Mood & affect are normal.  Shoulder exam: right  Tenderness: AC joint, lateral acromial  ROM: forward flexion 60 / 60, extension 70 / 70, full abduction 70 / 70  Shoulder Strength: biceps 5/5, triceps 4/5, abduction 4/5, adduction 4/5  positive for tenderness about the glenohumeral joint, positive for tenderness over the acromioclavicular joint, and positive for impingement sign                       RADIOGRAPHS:  X-rays the shoulder taken today films reviewed by me demonstrate mild to moderate arthritic changes throughout the shoulder with glenohumeral joint space narrowing early sclerotic changes in the humeral head as well as the glenoid mild-to-moderate AC arthritis evidence of fracture dislocation or other bony abnormality    ASSESSMENT/PLAN:       ICD-10-CM ICD-9-CM   1. Chronic right shoulder pain  M25.511 719.41    G89.29 338.29       Plan: We discussed with the patient at length all the different treatment options available for his shoulder we will proceed with therapeutic diagnostic cortisone injection of the right shoulder followed by course of physical therapy for strength range of motion dry needling     The injection site was identified and the skin was prepared with an ETOH solution. The    right  shoulder was injected with 1 ml of Celestone and 5 ml Lidocaine under sterile technique. Diego Gunter tolerated the procedure well, he was advised to rest the  shoulder  today, ice and support. he did receive immediate relief of the pain in and about his  shoulder  he was told this would be short lived and is secondary to the lidocaine. he may have an increase in his discomfort tonight followed by steady improvement over the next several days. It may take 1-3 weeks following the injection to get the full benefit of the medication.  I will see him back " in 4-6 months. Sooner if he has any problems or concerns.    Diego Gunter was advised to monitor his blood sugars closely over the next several days. The steroid may cause a rise in them. If his glucose levels rise to a point he is uncomfortable or he is unable to control them is is to contact his PCP or go immediately to the emergency department.

## 2023-03-06 ENCOUNTER — OFFICE VISIT (OUTPATIENT)
Dept: FAMILY MEDICINE | Facility: CLINIC | Age: 83
End: 2023-03-06
Payer: MEDICARE

## 2023-03-06 VITALS
HEIGHT: 69 IN | DIASTOLIC BLOOD PRESSURE: 62 MMHG | OXYGEN SATURATION: 97 % | BODY MASS INDEX: 23.35 KG/M2 | TEMPERATURE: 98 F | SYSTOLIC BLOOD PRESSURE: 108 MMHG | WEIGHT: 157.63 LBS | HEART RATE: 60 BPM

## 2023-03-06 DIAGNOSIS — A31.0 MAI (MYCOBACTERIUM AVIUM-INTRACELLULARE): ICD-10-CM

## 2023-03-06 DIAGNOSIS — R00.1 BRADYCARDIA: ICD-10-CM

## 2023-03-06 DIAGNOSIS — J43.2 CENTRILOBULAR EMPHYSEMA: ICD-10-CM

## 2023-03-06 DIAGNOSIS — Z00.00 ROUTINE MEDICAL EXAM: Primary | ICD-10-CM

## 2023-03-06 DIAGNOSIS — I27.20 MILD PULMONARY HYPERTENSION: ICD-10-CM

## 2023-03-06 DIAGNOSIS — G31.84 MILD COGNITIVE IMPAIRMENT: ICD-10-CM

## 2023-03-06 DIAGNOSIS — M25.621 STIFFNESS OF RIGHT UPPER ARM JOINT: ICD-10-CM

## 2023-03-06 DIAGNOSIS — I70.0 ATHEROSCLEROSIS OF AORTA: ICD-10-CM

## 2023-03-06 DIAGNOSIS — E78.49 OTHER HYPERLIPIDEMIA: ICD-10-CM

## 2023-03-06 DIAGNOSIS — D68.61 ANTIPHOSPHOLIPID SYNDROME: ICD-10-CM

## 2023-03-06 DIAGNOSIS — R73.03 PREDIABETES: ICD-10-CM

## 2023-03-06 PROBLEM — G56.21 CUBITAL TUNNEL SYNDROME ON RIGHT: Status: RESOLVED | Noted: 2023-01-10 | Resolved: 2023-03-06

## 2023-03-06 PROBLEM — G56.01 CARPAL TUNNEL SYNDROME ON RIGHT: Status: RESOLVED | Noted: 2023-01-10 | Resolved: 2023-03-06

## 2023-03-06 PROCEDURE — 1159F MED LIST DOCD IN RCRD: CPT | Mod: CPTII,S$GLB,, | Performed by: INTERNAL MEDICINE

## 2023-03-06 PROCEDURE — 99999 PR PBB SHADOW E&M-EST. PATIENT-LVL IV: CPT | Mod: PBBFAC,,, | Performed by: INTERNAL MEDICINE

## 2023-03-06 PROCEDURE — 1126F PR PAIN SEVERITY QUANTIFIED, NO PAIN PRESENT: ICD-10-PCS | Mod: CPTII,S$GLB,, | Performed by: INTERNAL MEDICINE

## 2023-03-06 PROCEDURE — 99999 PR PBB SHADOW E&M-EST. PATIENT-LVL IV: ICD-10-PCS | Mod: PBBFAC,,, | Performed by: INTERNAL MEDICINE

## 2023-03-06 PROCEDURE — 3078F PR MOST RECENT DIASTOLIC BLOOD PRESSURE < 80 MM HG: ICD-10-PCS | Mod: CPTII,S$GLB,, | Performed by: INTERNAL MEDICINE

## 2023-03-06 PROCEDURE — 99397 PER PM REEVAL EST PAT 65+ YR: CPT | Mod: GZ,S$GLB,, | Performed by: INTERNAL MEDICINE

## 2023-03-06 PROCEDURE — 1126F AMNT PAIN NOTED NONE PRSNT: CPT | Mod: CPTII,S$GLB,, | Performed by: INTERNAL MEDICINE

## 2023-03-06 PROCEDURE — 3288F FALL RISK ASSESSMENT DOCD: CPT | Mod: CPTII,S$GLB,, | Performed by: INTERNAL MEDICINE

## 2023-03-06 PROCEDURE — 1157F PR ADVANCE CARE PLAN OR EQUIV PRESENT IN MEDICAL RECORD: ICD-10-PCS | Mod: CPTII,S$GLB,, | Performed by: INTERNAL MEDICINE

## 2023-03-06 PROCEDURE — 3288F PR FALLS RISK ASSESSMENT DOCUMENTED: ICD-10-PCS | Mod: CPTII,S$GLB,, | Performed by: INTERNAL MEDICINE

## 2023-03-06 PROCEDURE — 1160F PR REVIEW ALL MEDS BY PRESCRIBER/CLIN PHARMACIST DOCUMENTED: ICD-10-PCS | Mod: CPTII,S$GLB,, | Performed by: INTERNAL MEDICINE

## 2023-03-06 PROCEDURE — 3078F DIAST BP <80 MM HG: CPT | Mod: CPTII,S$GLB,, | Performed by: INTERNAL MEDICINE

## 2023-03-06 PROCEDURE — 3074F SYST BP LT 130 MM HG: CPT | Mod: CPTII,S$GLB,, | Performed by: INTERNAL MEDICINE

## 2023-03-06 PROCEDURE — 1159F PR MEDICATION LIST DOCUMENTED IN MEDICAL RECORD: ICD-10-PCS | Mod: CPTII,S$GLB,, | Performed by: INTERNAL MEDICINE

## 2023-03-06 PROCEDURE — 3074F PR MOST RECENT SYSTOLIC BLOOD PRESSURE < 130 MM HG: ICD-10-PCS | Mod: CPTII,S$GLB,, | Performed by: INTERNAL MEDICINE

## 2023-03-06 PROCEDURE — 1100F PR PT FALLS ASSESS DOC 2+ FALLS/FALL W/INJURY/YR: ICD-10-PCS | Mod: CPTII,S$GLB,, | Performed by: INTERNAL MEDICINE

## 2023-03-06 PROCEDURE — 1157F ADVNC CARE PLAN IN RCRD: CPT | Mod: CPTII,S$GLB,, | Performed by: INTERNAL MEDICINE

## 2023-03-06 PROCEDURE — 1160F RVW MEDS BY RX/DR IN RCRD: CPT | Mod: CPTII,S$GLB,, | Performed by: INTERNAL MEDICINE

## 2023-03-06 PROCEDURE — 99397 PR PREVENTIVE VISIT,EST,65 & OVER: ICD-10-PCS | Mod: GZ,S$GLB,, | Performed by: INTERNAL MEDICINE

## 2023-03-06 PROCEDURE — 1100F PTFALLS ASSESS-DOCD GE2>/YR: CPT | Mod: CPTII,S$GLB,, | Performed by: INTERNAL MEDICINE

## 2023-03-06 NOTE — PROGRESS NOTES
HISTORY OF PRESENT ILLNESS:  Diego Gunter is a 82 y.o. male who presents to the clinic today for a routine medical physical exam. His last physical exam was approximately 1 years(s) ago.    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 Dr. Pham    Hx of colonic polyps     followed by GI. Dr. Pham    Hyperlipidemia associated with type 2 diabetes mellitus     Hyperlipidemia LDL goal <100     Hypernatremia 5/5/2022    Hypertension associated with type 2 diabetes mellitus     Hypertension associated with type 2 diabetes mellitus     Hypotension     Intermittent confusion 4/29/2022    Overweight(278.02)     Prostate cancer 09/2011    followed by urology, Dr. Rogers    Pulmonary embolism     Reducible right inguinal hernia 10/10/2022    Right hemiparesis 7/22/2022    Secondary to cervical hematoma    Type II or unspecified type diabetes mellitus without mention of complication, not stated as uncontrolled     diet controlled    Urinary retention 4/29/2022       PAST SURGICAL HISTORY:  Past Surgical History:   Procedure Laterality Date    BRONCHOSCOPY N/A 10/15/2018    Procedure: BRONCHOSCOPY;  Surgeon: Pratibha Diagnostic Provider;  Location: [address];  Service: Anesthesiology;  Laterality: N/A;    CARPAL TUNNEL RELEASE Right 1/11/2023    Procedure: RELEASE, CARPAL TUNNEL;  Surgeon: Romero Lazo MD;  Location: [facility];  Service: Orthopedics;  Laterality: Right;     CATARACT EXTRACTION, BILATERAL      DECOMPRESSION, NERVE, ULNAR Right 2023    Procedure: DECOMPRESSION, NERVE, ULNAR - right cub jolie and guyon's decompression as well as open right carpal tunnel release;  Surgeon: Romero Lazo MD;  Location: [facility];  Service: Orthopedics;  Laterality: Right;    POSTERIOR CERVICAL LAMINECTOMY Bilateral 2022    Procedure: LAMINECTOMY, SPINE, CERVICAL, POSTERIOR APPROACH C3-6;  Surgeon: Carlos Miller MD;  Location: [address];  Service: Neurosurgery;  Laterality: Bilateral;    PROSTATECTOMY      REPAIR, HERNIA, INGUINAL, WITHOUT HISTORY OF PRIOR REPAIR, AGE 5 YEARS OR OLDER Right 10/10/2022    Procedure: REPAIR, HERNIA, INGUINAL, WITHOUT HISTORY OF PRIOR REPAIR, AGE 5 YEARS OR OLDER;  Surgeon: Dario Esquivel MD;  Location: [address];  Service: General;  Laterality: Right;  open right inguinal hernia repair with mesh       SOCIAL HISTORY:  Social History     Socioeconomic History    Marital status:      Spouse name: Addis    Number of children: 2   Occupational History    Occupation: WIB    Tobacco Use    Smoking status: Former     Packs/day: 0.25     Years: 5.00     Pack years: 1.25     Types: Cigarettes     Quit date: 10/2/1962     Years since quittin.4    Smokeless tobacco: Former    Tobacco comments:     quit age 23   Substance and Sexual Activity    Alcohol use: Not Currently    Drug use: No    Sexual activity: Yes     Partners: Female     Social Determinants of Health     Financial Resource Strain: Low Risk     Difficulty of Paying Living Expenses: Not hard at all   Food Insecurity: No Food Insecurity    Worried About Running Out of Food in the Last Year: Never true    Ran Out of Food in the Last Year: Never true   Transportation Needs: No Transportation Needs    Lack of Transportation (Medical): No    Lack of Transportation (Non-Medical): No   Physical Activity: Insufficiently Active    Days of Exercise per Week: 5 days     Minutes of Exercise per Session: 10 min   Stress: No Stress Concern Present    Feeling of Stress : Not at all   Social Connections: Moderately Isolated    Frequency of Communication with Friends and Family: More than three times a week    Frequency of Social Gatherings with Friends and Family: Once a week    Attends Christian Services: Never    Active Member of Clubs or Organizations: No    Attends Club or Organization Meetings: Never    Marital Status:    Housing Stability: Low Risk     Unable to Pay for Housing in the Last Year: No    Number of Places Lived in the Last Year: 1    Unstable Housing in the Last Year: No       FAMILY HISTORY:  Family History   Problem Relation Age of Onset    Colon cancer Mother     Diabetes Mother     Heart disease Father     Mental illness Sister     Diabetes Sister     Other Brother         polio as a child    Pulmonary fibrosis Brother     Diabetes Brother     Myasthenia gravis Brother     Parkinsonism Brother     Diabetes Brother     Dementia Brother     Lung cancer Brother         smoker    Diabetes Maternal Aunt     Diabetes Other     Esophageal cancer Neg Hx        ALLERGIES AND MEDICATIONS: updated and reviewed.  Review of patient's allergies indicates:  No Known Allergies  Medication List with Changes/Refills   Current Medications    ALBUTEROL (VENTOLIN HFA) 90 MCG/ACTUATION INHALER    Inhale 2 puffs into the lungs daily as needed (30 min before arikayce). Rescue    ASPIRIN (ECOTRIN) 81 MG EC TABLET    Take 81 mg by mouth once daily.    AZELASTINE (ASTELIN) 137 MCG (0.1 %) NASAL SPRAY    2 sprays (274 mcg total) by Nasal route 2 (two) times daily.    AZITHROMYCIN (ZITHROMAX) 500 MG TABLET    Take 500 mg by mouth once daily.    B COMPLEX VITAMINS CAPSULE    Take 1 capsule by mouth once daily.    ETHAMBUTOL (MYAMBUTOL) 400 MG TAB    Take 2 tablets (800 mg total) by mouth once daily.    NEBULIZER AND COMPRESSOR (OMBRA COMPRESSOR SYSTEM) DANIEL    use to administer  nebulized medication as directed    PRAVASTATIN (PRAVACHOL) 10 MG TABLET    ONE TABLET BY MOUTH once a day    SODIUM CHLORIDE 3% 3 % NEBULIZER SOLUTION    USE 4 ML VIAL IN NEBULIZER  TWICE DAILY    SODIUM CHLORIDE 7% 7 % NEBULIZER SOLUTION    use one vial in nebulizer twice a day         CARE TEAM:  Patient Care Team:  Portia Ferreira MD as PCP - General (Internal Medicine)  Dustin Mccarthy III, MD as Consulting Physician (Orthopedic Surgery)  Pierre Rogers MD as Consulting Physician (Urology)  Burak Gurrola MD as Consulting Physician (Pulmonary Disease)  Theo Alvares MD as Consulting Physician (Infectious Diseases)  Suzan Glass LPN as Licensed Practical Nurse  Gilberto Silva OD as Consulting Physician (Optometry)  Thien Joy MD (Inactive) as Consulting Physician (Gastroenterology)  Yuan Lundberg MD as Consulting Physician (Cardiology)  Neel Jimenez MD as Consulting Physician (Neurology)  Paola Rao MD as Consulting Physician (Infectious Diseases)  Fiorella Garcia NP as Nurse Practitioner (Urology)  Carlos Miller MD as Consulting Physician (Neurosurgery)  Ayesha Hall MD as Consulting Physician (Otolaryngology)  MAX Delaney (Family Medicine)             SCREENING HISTORY:  Health Maintenance         Date Due Completion Date    COVID-19 Vaccine (4 - Booster for Pfizer series) 12/31/2021 11/5/2021    Lipid Panel 04/28/2023 4/28/2022    Hemoglobin A1c 04/28/2023 4/28/2022    TETANUS VACCINE 03/08/2025 3/8/2015              REVIEW OF SYSTEMS:   The patient reports : good dietary habits.  The patient reports  : that they exercise regularly - he was using his elliptical at home but has started to go back to the gym.  Review of Systems   Constitutional:  Negative for chills and fever.   HENT:  Negative for congestion.    Respiratory:  Positive for cough (chronic; stable).    Cardiovascular:  Negative for chest pain and leg swelling.   Gastrointestinal:   "Negative for abdominal pain.   Genitourinary:  Negative for dysuria.   Musculoskeletal:  Positive for arthralgias (currently seeing ortho for frozen right shoulder (had right CTS and ulnar entrapment surgery)).   Neurological:         He is generally unsteady on his feet.   ROS : patient denies: difficulty initiating urination          PHYSICAL EXAM:  274}  Vitals:    03/06/23 0848   BP: 108/62   Pulse: 60   Temp: 97.9 °F (36.6 °C)   TempSrc: Oral   SpO2: 97%   Weight: 71.5 kg (157 lb 10.1 oz)   Height: 5' 9" (1.753 m)       Body mass index is 23.28 kg/m².    General appearance - alert and in no distress, normal appearing weight  Psychiatric - alert, oriented to person, place, and time, normal behavior, speech, dress, motor activity and thought process, fair memory  Eyes - pupils equal and reactive, extraocular eye movements intact, sclera anicteric  Mouth - not examined  Neck - supple, no significant adenopathy, carotids upstroke normal bilaterally, no bruits  Lymphatics - no palpable cervical lymphadenopathy  Chest - clear to auscultation, no wheezes, rales or rhonchi, symmetric air entry, no tachypnea, retractions or cyanosis  Heart - bradycardia with regular rhythm, with frequent PVCs  Neurological - alert, normal speech, no focal findings; cranial nerves II through XII intact  Musculoskeletal - patient noted to have Mild-Moderate osteoarthritic changes to both knee joints. No joint effusions noted., moderate osteoarthritic changes noted in both hands, he was noted to be a little unsteady on his feet  Extremities - no pedal edema noted  Skin - normal coloration, no suspicious skin lesions      Labs:  Ordered/Scheduled        ASSESSMENT AND PLAN: 274}  1. Routine medical exam  Counseled on age appropriate medical preventative services including age appropriate cancer screenings, age appropriate eye and dental exams, over all nutritional health, need for a consistent exercise regimen, and an over all push " towards maintaining a vigorous and active lifestyle.  Counseled on age appropriate vaccines and discussed upcoming health care needs based on age/gender. Discussed good sleep hygiene and stress management.    2. Bradycardia  Stable. Asymptomatic. Observe.    3. Other hyperlipidemia  Lab Results   Component Value Date    CHOL 116 (L) 04/28/2022     Lab Results   Component Value Date    HDL 36 (L) 04/28/2022     Lab Results   Component Value Date    LDLCALC 62.4 (L) 04/28/2022     Lab Results   Component Value Date    TRIG 88 04/28/2022     Lab Results   Component Value Date    LDLCALC 62.4 (L) 04/28/2022     We discussed low fat diet and regular exercise.The current medical regimen is effective;  continue present plan and medications.   -     CBC Auto Differential; Future; Expected date: 03/06/2023  -     Lipid Panel; Future; Expected date: 03/06/2023    4. Atherosclerosis of aorta  Patient with Atherosclerosis of the Aorta.  Stable/asymptomatic. Currently stable on lipid lowering medication and blood pressure monitoring.  Overview:  - noted on CT chest 9/2018      5. Prediabetes  Lab Results   Component Value Date    HGBA1C 5.7 (H) 04/28/2022     Stable. We discussed low sugar/low carbohydrate diet and regular exercise to prevent progression. No need for prescription medication at this time.  Check A1c yearly.  Overview:  - hx of diet controlled type 2 diabetes - resolved 3/2022 due to A1c < 6.5 for at least 5 years without medication  - not on ace inhibitor  (has intermittent high potassium level)        Orders:  -     Hemoglobin A1C; Future; Expected date: 03/06/2023    6. Mild pulmonary hypertension  Stable. Asymptomatic. Observe.  Overview:  12/2019 - The estimated PA systolic pressure is 30 mm Hg      7. Centrilobular emphysema  The current medical regimen is effective;  continue present plan and medications.   Followed by: Pulmonology.   Overview:  - noted on CT of chest 9/2018.  Unable to perform pft.  No guzman  with adl.   Will start a trial of albuterol.       8. HILLARY (mycobacterium avium-intracellulare)  He is currently being evaluated for a new therapy plan.   Followed by: ID.   Overview:  - diagnosed Fall 2018  - followed by ID  - no need for Rx treatment at this time per Dr. Clark.  In light of evolving rul cavitary lesion, will refer back to ID.  Dr. Clark no longer practice infectious disease.  ID appointment on 10/6/21      9. Antiphospholipid syndrome  Stable. Asymptomatic. Observe.  Overview:  APS IIb (+aCL Ab).  His DRVVT was positive for lupus anticoagulant on repeat testing (was not initially positive) [repeat hypercoag tests were done while off of anticoagulation].      10. Mild cognitive impairment  Symptoms mild. Observe.  Overview:  See 4/2020 Neuropsych note      11. Stiffness of right upper arm joint  He is seeing orthopedics for right frozen shoulder.  He is doing some therapy.  He is getting regular injections.  Hopefully his symptoms will improve.  He is not interested in surgery.           Orders Placed This Encounter   Procedures    CBC Auto Differential    Hemoglobin A1C    Lipid Panel       Follow up in about 6 months (around 9/6/2023), or if symptoms worsen or fail to improve, for follow up chronic medical conditions.. or sooner as needed.

## 2023-03-07 ENCOUNTER — CLINICAL SUPPORT (OUTPATIENT)
Dept: REHABILITATION | Facility: HOSPITAL | Age: 83
End: 2023-03-07
Payer: MEDICARE

## 2023-03-07 DIAGNOSIS — M25.621 STIFFNESS OF RIGHT UPPER ARM JOINT: Primary | ICD-10-CM

## 2023-03-07 DIAGNOSIS — R68.89 DECREASED FUNCTIONAL ACTIVITY TOLERANCE: ICD-10-CM

## 2023-03-07 PROCEDURE — 97140 MANUAL THERAPY 1/> REGIONS: CPT

## 2023-03-07 PROCEDURE — 97110 THERAPEUTIC EXERCISES: CPT

## 2023-03-07 PROCEDURE — 97022 WHIRLPOOL THERAPY: CPT

## 2023-03-07 NOTE — PROGRESS NOTES
"  Occupational Therapy Daily Treatment Note     [facility]  [facility] Number: [id number]    Therapy Diagnosis:   Encounter Diagnoses   Name Primary?    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, right   G56.21 (ICD-10-CM) - Cubital tunnel syndrome on right   Z98.890 (ICD-10-CM) - Postoperative state      Evaluation Date: 1/27/2023  Plan of Care Certification Date: 04/21/23  Authorization Period: 1/27/23 - 3/31/23  Surgery Date and Procedure: 01/11/23  Right wrist Guyons decompression, carpal tunnel release and cubital tunnel release at the elbow   Date of Return to MD: 02/28/23  FOTO Completion: 1/30/23    Visit #: 12 / 23    Time In: 10:00 am  Time Out: 10:55 pm  Total Billable Time:  55 minutes    Precautions:  Standard      Subjective     Pt reports: "I have a PT eval next week for my shoulder. My elbow feels a bit better too, I've been doing the stretches."  he was compliant with home exercise program given last session.   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     Wound Assessment:   Incisions fully closed, no signs of infection  Size: incision length about 2 inches at elbow, and 1 inch at volar wrist  Location: Right medial elbow, volar wrist    Scar about elbow is supple, able to  and roll  Scar about volar wrist is thickened, limited mobility     Edema: Circumferential measurements: In centimters       " Left  1/27/2023 Right   1/27/2023   MPs 20.8 cm 22.5 cm   Proximal Palmar Crease    NT  NT   Proximal Wrist Crease 16.6 cm 17.2 cm        Left   1/27/2023 Right   1/27/2023   3 Inches distal to elbow crease 23.5 cm 23 cm   Elbow crease 24.7 cm 27.4 cm   3 Inches proximal to elbow crease NT NT         Shoulder AROM: Measured in degrees.    Left  1/27/2023 Right  1/27/2023   Extension/Flexion WFL ~70 flexion   Abduction WFL ~70   IR/ER WFL ~60 ER         Elbow and Wrist ROM. Measured in degrees.    Left  1/27/2023 Right  1/27/2023 Right   2/1/23 Right   2/2 Right:  2/6/23 Right:  2/13/23 Right   2/16 Right Right  2/24/23   Elbow Ext/Flex WNL -45/135  -35 PROM Extension:    -30 AROM  -20 PROM Extension:    Pre tx: -30  After heat, PROM: -15   Extension:    Pre tx: -25  Post tx: -20    PROM -20 Extension:    Pre: -30  Post: -30    PROM: -20 Extension     After heat:  -25 PROM    -22 extension AROM -30  PROM -30    Post tx:  AAROM -20   Supination/Pronation 75/75 75/70          Wrist Ext/Flex 65/70 55/35       50/45   Wrist RD/UD 15/35 15/20                Hand ROM. Measured in degrees.     Pt unable to achieve full extension of Right hand, secondary to PIP contractures of the RF/SF, and not able to touch DPC with digits but demonstrates WFL for flexion of all digits    Left  1/27/2023 Right  1/27/2023 Right  2/24/23   Thumb: MP                     IP            Rad ADD/ABD            Pal ADD/ABD               Opposition             Dist to DPC 0 cm     4.5 cm 4.0 cm        Sensation: Ulnar Nerve and Median Distribution   2/6/23 2/6/23    Left Right   Monofilament Testing     Normal 1.65-2.83     Diminished Light Touch 3.22-3.61  3.61 on median nerve   Diminished Protective 3.84-4.31  4.31 on ulnar nerve   Loss of Protective 4.56-6.65     Untestable >6.65       STRENGTH: (Measured in pounds using a Dynamometer and pinch meter)   Right Left Right    2/14/23 2/14/23 3/7/23    Setting 2 25, 25, 25 65, 65, 70 25, 25 ,25     Average 25 67 25   Key 4 17 6   3 Pt NT due to inadequate contact of IF with pinch meter d/t amputation NT    Tip 4 14 7     Special Testing/MMT:  Lumbricals: Observed full AROM bilaterally, but difficulty to decipher true lumbrical activation in the Right hand due to 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   FOTO documents entered into [employer] - see Media section.     Limitation Score: 50              Treatment   Diego received the following supervised modalities after being cleared for contraindications for 10 minutes:   - Fluidotherapy - 115 degrees and 50 speed, to Right hand for 10 minutes for increased blood flow and circulation and increased tissue extensibility.    Diego received the following manual therapy techniques for 10 minutes:   - STM with IASInstant Opinion tool to volar wrist scar, forearm musculature, and biceps    Diego performed therapeutic exercises for 35 minutes including:  - AROM elbow flex/ext in pro and sup position, sup/pro, wrist ext/flex, ab/adduction (x10 reps each)  - Reassessment of  and strength  - Upgrade to red therapy: , rolls, pinches, ab/adduction  - In hand manipulation with foam pieces, ab/adduction  and release  - Resistive gripper for foam piece , white spring on 3rd sprung and 4th sprung  - Red resistive pin for pinch foam piece       Not performed today:  - Supine on plinth: bilateral chest press with 2 lb dowel, 2 x10  - Supine on plinth: bilateral shoulder flexion with 2 lb dowel,  2 x10  - Supine on plinth: overhead triceps extension with 2 kg yellow ball, 2 x 10  - Yellow  theraband triceps extension  2 x20  - Wrist curls flex/ext with 2 lb dumbbell (2 x 10)  - Towel slides on table top to facilitate elbow extension/flex (x2 min)  - Medium pom pom  with ad/abduction  - Translation and placement with pegs  - Red flex bar for wrist flex/ext  - Octy (3 min)      Home Exercises and Education Provided     Education provided:   - Pt education on safety due to diminished protective sensation throughout ulnar n. distribution  - Reviewed HEP to include AROM elbow, forearm, wrist and supine elbow extension stretch, blue sponge squeezes, and addition of yellow therpautty tasks  - Progress towards goals     Written Home Exercises Provided: Patient instructed to cont prior HEP.  Exercises were reviewed and Diego was able to demonstrate them prior to the end of the session.  Diego demonstrated good  understanding of the HEP provided.     See EMR under Patient Instructions for exercises provided prior visit.        Assessment     Diego tolerated all treatment tasks well with no complaints of pain.  He demonstrates slight improvement with pinch strength but no change noted with  strength. Progress with strengthening today and he tolerated well. He did notice shoulder fatigue with resistive  exercises. He will be seeing PT next week for shoulder evaluation.    Diego is progressing well towards his goals and there are no updates to goals at this time. Pt prognosis is Fair.     Pt will continue to benefit from skilled outpatient occupational therapy to address the deficits listed in the problem list on initial evaluation provide pt/family education and to maximize pt's level of independence in the home and community environment.     Anticipated barriers to occupational therapy: PLOF, PMH/comorbidities    Pt's spiritual, cultural and educational needs considered and pt agreeable to plan of care and goals.    Goals:   LTG's (8 weeks):  1)   Demonstrate improved elbow active extension to  -25 degrees to increase functional hand use for ADLs.  - Met 2/16  2)   Demonstrate 20 lbs of right  strength to facilitate independence with activities of daily living  - Ongoing   3)   Decrease complaints of pain to  2 out of 10 at worst to increase functional hand use for ADL/work/leisure activities.  - Met 2/16  4)   Patient to score at 40% or less on FOTO to demonstrate improved perception of functional RUE use.  - Ongoing   5) Demonstrate no greater than .5 cm difference in edema of Right MPs and elbow crease compared to unaffected side.  - Ongoing      STG's (4 weeks)  1)   Patient to be IND with HEP and modalities for pain/edema managment.  - Ongoing   2)   Demonstrate -35 degrees of active elbow extension to increase functional hand use for ADLs/work/leisure activities.  Met 2/1  3) Demonstrate no greater than 1.0 cm difference in edema of Right MPs and elbow crease compared to unaffected side.  - Ongoing   4)   Decrease complaints of pain to  4 out of 10 at worst to increase functional hand use for ADL/work/leisure activities.  - Ongoing        Plan     Continue to progress as tolerated.  Updates/Grading for next session: Progress with strengthening and weightbearing as tolerated, such as wall push ups, wrist curls      Destiney Hankins OTR/L

## 2023-03-08 ENCOUNTER — PES CALL (OUTPATIENT)
Dept: ADMINISTRATIVE | Facility: CLINIC | Age: 83
End: 2023-03-08
Payer: MEDICARE

## 2023-03-08 ENCOUNTER — HOSPITAL ENCOUNTER (OUTPATIENT)
Dept: CARDIOLOGY | Facility: HOSPITAL | Age: 83
Discharge: HOME | End: 2023-03-08
Attending: INTERNAL MEDICINE
Payer: MEDICARE

## 2023-03-08 ENCOUNTER — HOSPITAL ENCOUNTER (OUTPATIENT)
Dept: RADIOLOGY | Facility: HOSPITAL | Age: 83
Discharge: HOME | End: 2023-03-08
Attending: INTERNAL MEDICINE
Payer: MEDICARE

## 2023-03-08 DIAGNOSIS — A31.0 MYCOBACTERIUM AVIUM-INTRACELLULARE COMPLEX: ICD-10-CM

## 2023-03-08 PROCEDURE — 93010 EKG 12-LEAD: ICD-10-PCS | Mod: ,,, | Performed by: INTERNAL MEDICINE

## 2023-03-08 PROCEDURE — 71250 CT THORAX DX C-: CPT | Mod: TC

## 2023-03-08 PROCEDURE — 71250 CT CHEST WITHOUT CONTRAST: ICD-10-PCS | Mod: 26,,, | Performed by: RADIOLOGY

## 2023-03-08 PROCEDURE — 93005 ELECTROCARDIOGRAM TRACING: CPT

## 2023-03-08 PROCEDURE — 93010 ELECTROCARDIOGRAM REPORT: CPT | Mod: ,,, | Performed by: INTERNAL MEDICINE

## 2023-03-08 PROCEDURE — 71250 CT THORAX DX C-: CPT | Mod: 26,,, | Performed by: RADIOLOGY

## 2023-03-10 ENCOUNTER — TELEPHONE (OUTPATIENT)
Dept: FAMILY MEDICINE | Facility: CLINIC | Age: 83
End: 2023-03-10

## 2023-03-10 NOTE — TELEPHONE ENCOUNTER
----- Message from Kali Kd sent at 3/9/2023 11:09 AM CST -----  Regarding: Royce Patel [phone]  Type: Patient Call Back    Who called: Royce Patel    What is the request in detail: called in regards to a prior auth request sent on march 2nd and on march for a medication for a pt, checking on the status of that request.     Can the clinic reply by [facility]? No     Would the patient rather a call back or a response via [facility]? Call back     Best call back number: [phone]    Additional Information:    Thank you.

## 2023-03-13 ENCOUNTER — CLINICAL SUPPORT (OUTPATIENT)
Dept: REHABILITATION | Facility: HOSPITAL | Age: 83
End: 2023-03-13
Payer: MEDICARE

## 2023-03-13 ENCOUNTER — LAB VISIT (OUTPATIENT)
Dept: LAB | Facility: HOSPITAL | Age: 83
End: 2023-03-13
Attending: INTERNAL MEDICINE
Payer: MEDICARE

## 2023-03-13 DIAGNOSIS — R68.89 DECREASED FUNCTIONAL ACTIVITY TOLERANCE: ICD-10-CM

## 2023-03-13 DIAGNOSIS — M25.621 STIFFNESS OF RIGHT UPPER ARM JOINT: Primary | ICD-10-CM

## 2023-03-13 DIAGNOSIS — A31.0 MYCOBACTERIUM AVIUM-INTRACELLULARE COMPLEX: ICD-10-CM

## 2023-03-13 PROCEDURE — 87116 MYCOBACTERIA CULTURE: CPT | Performed by: INTERNAL MEDICINE

## 2023-03-13 PROCEDURE — 87206 SMEAR FLUORESCENT/ACID STAI: CPT | Performed by: INTERNAL MEDICINE

## 2023-03-13 PROCEDURE — 97022 WHIRLPOOL THERAPY: CPT

## 2023-03-13 PROCEDURE — 87118 MYCOBACTERIC IDENTIFICATION: CPT | Mod: 59 | Performed by: INTERNAL MEDICINE

## 2023-03-13 PROCEDURE — 87150 DNA/RNA AMPLIFIED PROBE: CPT | Performed by: INTERNAL MEDICINE

## 2023-03-13 PROCEDURE — 87118 MYCOBACTERIC IDENTIFICATION: CPT | Mod: 91 | Performed by: INTERNAL MEDICINE

## 2023-03-13 PROCEDURE — 97110 THERAPEUTIC EXERCISES: CPT

## 2023-03-13 PROCEDURE — 87186 SC STD MICRODIL/AGAR DIL: CPT | Performed by: INTERNAL MEDICINE

## 2023-03-13 PROCEDURE — 97530 THERAPEUTIC ACTIVITIES: CPT

## 2023-03-13 PROCEDURE — 87015 SPECIMEN INFECT AGNT CONCNTJ: CPT | Performed by: INTERNAL MEDICINE

## 2023-03-13 PROCEDURE — 97140 MANUAL THERAPY 1/> REGIONS: CPT

## 2023-03-13 PROCEDURE — 87118 MYCOBACTERIC IDENTIFICATION: CPT | Performed by: INTERNAL MEDICINE

## 2023-03-13 PROCEDURE — 30000890 HC MISC. SEND OUT TEST: Performed by: INTERNAL MEDICINE

## 2023-03-13 NOTE — PROGRESS NOTES
"  Occupational Therapy Daily Treatment Note     Name: Diego Gunter  [facility] Number: [id number]    Therapy Diagnosis:   Encounter Diagnoses   Name Primary?    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 I'm seeing PT tomorrow for my shoulder"  he was compliant with home exercise program given last session.   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     Wound Assessment:   Incisions fully closed, no signs of infection  Size: incision length about 2 inches at elbow, and 1 inch at volar wrist  Location: Right medial elbow, volar wrist    Scar about elbow is supple, able to  and roll  Scar about volar wrist is thickened, limited mobility     Edema: Circumferential measurements: In centimters       Left  1/27/2023 Right   1/27/2023   MPs 20.8 cm " 22.5 cm   Proximal Palmar Crease    NT  NT   Proximal Wrist Crease 16.6 cm 17.2 cm        Left   1/27/2023 Right   1/27/2023   3 Inches distal to elbow crease 23.5 cm 23 cm   Elbow crease 24.7 cm 27.4 cm   3 Inches proximal to elbow crease NT NT         Shoulder AROM: Measured in degrees.    Left  1/27/2023 Right  1/27/2023   Extension/Flexion WFL ~70 flexion   Abduction WFL ~70   IR/ER WFL ~60 ER         Elbow and Wrist ROM. Measured in degrees.    Left  1/27/2023 Right  1/27/2023 Right   2/1/23 Right   2/2 Right:  2/6/23 Right:  2/13/23 Right   2/16 Right Right  2/24/23   Elbow Ext/Flex WNL -45/135  -35 PROM Extension:    -30 AROM  -20 PROM Extension:    Pre tx: -30  After heat, PROM: -15   Extension:    Pre tx: -25  Post tx: -20    PROM -20 Extension:    Pre: -30  Post: -30    PROM: -20 Extension     After heat:  -25 PROM    -22 extension AROM -30  PROM -30    Post tx:  AAROM -20   Supination/Pronation 75/75 75/70          Wrist Ext/Flex 65/70 55/35       50/45   Wrist RD/UD 15/35 15/20                Right  3/13/23   Elbow Extension/Flexion -20/140   Wrist Extension/Flexion 53/45          Hand ROM. Measured in degrees.     Pt unable to achieve full extension of Right hand, secondary to PIP contractures of the RF/SF, and not able to touch DPC with digits but demonstrates WFL for flexion of all digits    Left  1/27/2023 Right  1/27/2023 Right  2/24/23   Thumb: MP                     IP            Rad ADD/ABD            Pal ADD/ABD               Opposition             Dist to DPC 0 cm     4.5 cm 4.0 cm        Sensation: Ulnar Nerve and Median Distribution   2/6/23 2/6/23    Left Right   Monofilament Testing     Normal 1.65-2.83     Diminished Light Touch 3.22-3.61  3.61 on median nerve   Diminished Protective 3.84-4.31  4.31 on ulnar nerve   Loss of Protective 4.56-6.65     Untestable >6.65       STRENGTH: (Measured in pounds using a Dynamometer and pinch meter)   Right Left Right    2/14/23 2/14/23 3/7/23     Setting 2 25, 25, 25 65, 65, 70 25, 25 ,25    Average 25 67 25   Key 4 17 6   3 Pt NT due to inadequate contact of IF with pinch meter d/t amputation NT    Tip 4 14 7     Special Testing/MMT:  Lumbricals: Observed full AROM bilaterally, but difficulty to decipher true lumbrical activation in the Right hand due to PIP contractures of digits 3-5  Interossei: Limited AROM observed for ab/adduction of digits 2-5 on the Right hand compared to left side  MMT 1st dorsal interossei: 2- / 5 (palpable muscle contraction and limited AROM, unable to take any resistance)  Froment's sign: positive on the Right        Limitation/Restriction for FOTO Initial Survey     Therapist reviewed FOTO scores for Diegonaomi Gunter on 1/27/2023.   FOTO documents entered into [employer] - see Media section.     Limitation Score: 50              Treatment   Diego received the following supervised modalities after being cleared for contraindications for 10 minutes:   - Fluidotherapy - 115 degrees and 50 speed, to Right hand for 10 minutes for increased blood flow and circulation and increased tissue extensibility.    Diego received the following manual therapy techniques for 10 minutes:   - STM with IASTM tool to volar wrist scar, forearm musculature, and biceps  - Isolated and comp wrist flex/ext stretch    Diego performed therapeutic exercises for 17 minutes including:  - AROM elbow flex/ext in pro and sup position, sup/pro, wrist ext/flex, ab/adduction (x10 reps each)  - Reassessment of ROM  - Towel slides to facilitate AAROM elbow flex/ext (1 min)  - Wrist wheel for wrist/elbow flex/ext (2 min)    Diego participated in dynamic functional therapeutic activities to improve functional performance for 20  minutes, including:  - In hand manipulation with foam pieces to facilitate dexterity/coordination required for ADLs (2 sets)  - Stack/unstack and place pegs to facilitate coordination and pinch strength required for higher level ADLs (20  pins, 2 sets)  - Resistive gripper for foam piece , silver spring on 1st sprung, then white on 3rd sprung, to facilitate  strength required for ADLs/IADLs      Not performed today:  - Supine on plinth: bilateral chest press with 2 lb dowel, 2 x10  - Supine on plinth: bilateral shoulder flexion with 2 lb dowel,  2 x10  - Supine on plinth: overhead triceps extension with 2 kg yellow ball, 2 x 10  - Yellow theraband triceps extension  2 x20  - Wrist curls flex/ext with 2 lb dumbbell (2 x 10)  - Upgrade to red therapy: , rolls, pinches, ab/adduction  - Red resistive pin for pinch foam piece   - Red flex bar for wrist flex/ext  - Octy (3 min)      Home Exercises and Education Provided     Education provided:   - Pt education on safety due to diminished protective sensation throughout ulnar n. distribution  - Reviewed HEP to include AROM elbow, forearm, wrist and supine elbow extension stretch, blue sponge squeezes, and addition of yellow therpautty tasks  - Progress towards goals     Written Home Exercises Provided: Patient instructed to cont prior HEP.  Exercises were reviewed and Diego was able to demonstrate them prior to the end of the session.  Diego demonstrated good  understanding of the HEP provided.     See EMR under Patient Instructions for exercises provided prior visit.        Assessment     Diego tolerated all treatment tasks well with no complaints of pain. He demonstrated fatigue with resistive gripper tasks and difficulty grasping around gripper with SF due to PIP contracture. He will continue to benefit from progression of ROM and strengthening as tolerated.    Diego is progressing well towards his goals and there are no updates to goals at this time. Pt prognosis is Fair.     Pt will continue to benefit from skilled outpatient occupational therapy to address the deficits listed in the problem list on initial evaluation provide pt/family education and to maximize pt's level of  independence in the home and community environment.     Anticipated barriers to occupational therapy: PLOF, PMH/comorbidities    Pt's spiritual, cultural and educational needs considered and pt agreeable to plan of care and goals.    Goals:   LTG's (8 weeks):  1)   Demonstrate improved elbow active extension to -25 degrees to increase functional hand use for ADLs.  - Met 2/16  2)   Demonstrate 20 lbs of right  strength to facilitate independence with activities of daily living  - Met 3/7  3)   Decrease complaints of pain to  2 out of 10 at worst to increase functional hand use for ADL/work/leisure activities.  - Met 2/16  4)   Patient to score at 40% or less on FOTO to demonstrate improved perception of functional RUE use.  - Ongoing   5) Demonstrate no greater than .5 cm difference in edema of Right MPs and elbow crease compared to unaffected side.  - Ongoing      STG's (4 weeks)  1)   Patient to be IND with HEP and modalities for pain/edema managment.  - Ongoing   2)   Demonstrate -35 degrees of active elbow extension to increase functional hand use for ADLs/work/leisure activities.  Met 2/1  3) Demonstrate no greater than 1.0 cm difference in edema of Right MPs and elbow crease compared to unaffected side.  - Ongoing   4)   Decrease complaints of pain to  4 out of 10 at worst to increase functional hand use for ADL/work/leisure activities.  - Met 3/13       Plan     Continue to progress as tolerated.  Updates/Grading for next session: Progress with strengthening and weightbearing as tolerated      Destiney Hankins OTR/L

## 2023-03-14 ENCOUNTER — TELEPHONE (OUTPATIENT)
Dept: INFECTIOUS DISEASES | Facility: CLINIC | Age: 83
End: 2023-03-14
Payer: MEDICARE

## 2023-03-14 ENCOUNTER — CLINICAL SUPPORT (OUTPATIENT)
Dept: REHABILITATION | Facility: HOSPITAL | Age: 83
End: 2023-03-14
Payer: MEDICARE

## 2023-03-14 DIAGNOSIS — M25.511 CHRONIC RIGHT SHOULDER PAIN: Primary | ICD-10-CM

## 2023-03-14 DIAGNOSIS — G89.29 CHRONIC RIGHT SHOULDER PAIN: Primary | ICD-10-CM

## 2023-03-14 DIAGNOSIS — R29.898 DECREASED STRENGTH OF UPPER EXTREMITY: ICD-10-CM

## 2023-03-14 DIAGNOSIS — R68.89 DECREASED FUNCTIONAL ACTIVITY TOLERANCE: ICD-10-CM

## 2023-03-14 DIAGNOSIS — M25.621 STIFFNESS OF RIGHT UPPER ARM JOINT: Primary | ICD-10-CM

## 2023-03-14 DIAGNOSIS — M75.01 ADHESIVE CAPSULITIS OF RIGHT SHOULDER: ICD-10-CM

## 2023-03-14 DIAGNOSIS — M25.611 DECREASED RANGE OF MOTION OF RIGHT SHOULDER: ICD-10-CM

## 2023-03-14 DIAGNOSIS — M95.8 WINGING OF SCAPULA: ICD-10-CM

## 2023-03-14 PROCEDURE — 97022 WHIRLPOOL THERAPY: CPT

## 2023-03-14 PROCEDURE — 97530 THERAPEUTIC ACTIVITIES: CPT

## 2023-03-14 PROCEDURE — 97163 PT EVAL HIGH COMPLEX 45 MIN: CPT

## 2023-03-14 PROCEDURE — 97110 THERAPEUTIC EXERCISES: CPT

## 2023-03-14 NOTE — PROGRESS NOTES
"  Occupational Therapy Daily Treatment Note     Name: Diego Gunter  [facility] Number: [id number]    Therapy Diagnosis:   Encounter Diagnoses   Name Primary?    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 I'm seeing PT tomorrow for my shoulder"  he was compliant with home exercise program given last session.   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     Wound Assessment:   Incisions fully closed, no signs of infection  Size: incision length about 2 inches at elbow, and 1 inch at volar wrist  Location: Right medial elbow, volar wrist    Scar about elbow is supple, able to  and roll  Scar about volar wrist is thickened, limited mobility     Edema: Circumferential measurements: In centimters       Left  1/27/2023 Right   1/27/2023   MPs 20.8 cm " 22.5 cm   Proximal Palmar Crease    NT  NT   Proximal Wrist Crease 16.6 cm 17.2 cm        Left   1/27/2023 Right   1/27/2023   3 Inches distal to elbow crease 23.5 cm 23 cm   Elbow crease 24.7 cm 27.4 cm   3 Inches proximal to elbow crease NT NT         Shoulder AROM: Measured in degrees.    Left  1/27/2023 Right  1/27/2023   Extension/Flexion WFL ~70 flexion   Abduction WFL ~70   IR/ER WFL ~60 ER         Elbow and Wrist ROM. Measured in degrees.    Left  1/27/2023 Right  1/27/2023 Right   2/1/23 Right   2/2 Right:  2/6/23 Right:  2/13/23 Right   2/16 Right Right  2/24/23   Elbow Ext/Flex WNL -45/135  -35 PROM Extension:    -30 AROM  -20 PROM Extension:    Pre tx: -30  After heat, PROM: -15   Extension:    Pre tx: -25  Post tx: -20    PROM -20 Extension:    Pre: -30  Post: -30    PROM: -20 Extension     After heat:  -25 PROM    -22 extension AROM -30  PROM -30    Post tx:  AAROM -20   Supination/Pronation 75/75 75/70          Wrist Ext/Flex 65/70 55/35       50/45   Wrist RD/UD 15/35 15/20                Right  3/13/23   Elbow Extension/Flexion -20/140   Wrist Extension/Flexion 53/45          Hand ROM. Measured in degrees.     Pt unable to achieve full extension of Right hand, secondary to PIP contractures of the RF/SF, and not able to touch DPC with digits but demonstrates WFL for flexion of all digits    Left  1/27/2023 Right  1/27/2023 Right  2/24/23   Thumb: MP                     IP            Rad ADD/ABD            Pal ADD/ABD               Opposition             Dist to DPC 0 cm     4.5 cm 4.0 cm        Sensation: Ulnar Nerve and Median Distribution   2/6/23 2/6/23    Left Right   Monofilament Testing     Normal 1.65-2.83     Diminished Light Touch 3.22-3.61  3.61 on median nerve   Diminished Protective 3.84-4.31  4.31 on ulnar nerve   Loss of Protective 4.56-6.65     Untestable >6.65       STRENGTH: (Measured in pounds using a Dynamometer and pinch meter)   Right Left Right     2/14/23 2/14/23 3/7/23      Setting 2 25, 25, 25 65, 65, 70 25, 25 ,25 10lbs    Average 25 67 25    Key 4 17 6 8lbs   3 Pt NT due to inadequate contact of IF with pinch meter d/t amputation NT     Tip 4 14 7      Special Testing/MMT:  Lumbricals: Observed full AROM bilaterally, but difficulty to decipher true lumbrical activation in the Right hand due to 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   FOTO documents entered into [employer] - see Media section.     Limitation Score: 50              Treatment   Diego received the following supervised modalities after being cleared for contraindications for 10 minutes:   - Fluidotherapy - 115 degrees and 50 speed, to Right hand for 10 minutes for increased blood flow and circulation and increased tissue extensibility.    NT: Diego received the following manual therapy techniques for 10 minutes:   - STM with IASTM tool to volar wrist scar, forearm musculature, and biceps  - Isolated and comp wrist flex/ext stretch    Diego performed therapeutic exercises for 25 minutes including:  -Peach Putty Squeezes with functional wrist pulls 2 x 10  -Yellow RB spreads 3 x 10  -Red CP for lateral pinch 3 x 10  -Red Flexbar ( s/p, wrist e/f, RD/UD) 3 x 10  -UBE for elbow extension    Diego participated in dynamic functional therapeutic activities to improve functional performance for 25 minutes, including:  - In hand manipulation with foam pieces to facilitate dexterity/coordination required for ADLs (2 sets)  - Stack/unstack and place pegs to facilitate coordination and pinch strength required for higher level ADLs (20 pins, 2 sets)  - Resistive  gripper for foam piece , silver spring on 1st sprung, then white on 3rd sprung, to facilitate  strength required for ADLs/IADLs  -Peach putty dowel presses for ulnar sided  and lateral pinch x 2 min each  -Peach putty dowel slides to facilitate  and elbow extension x 3 min      Not performed today:  - Supine on plinth: bilateral chest press with 2 lb dowel, 2 x10  - Supine on plinth: bilateral shoulder flexion with 2 lb dowel,  2 x10  - Supine on plinth: overhead triceps extension with 2 kg yellow ball, 2 x 10  - Yellow theraband triceps extension  2 x20  - Wrist curls flex/ext with 2 lb dumbbell (2 x 10)  - Upgrade to red therapy: , rolls, pinches, ab/adduction  - Red resistive pin for pinch foam piece   - Red flex bar for wrist flex/ext  - Octy (3 min)      Home Exercises and Education Provided     Education provided:   - Pt education on safety due to diminished protective sensation throughout ulnar n. distribution  - Reviewed HEP to include AROM elbow, forearm, wrist and supine elbow extension stretch, blue sponge squeezes, and addition of yellow therpautty tasks  - Progress towards goals     Written Home Exercises Provided: Patient instructed to cont prior HEP.  Exercises were reviewed and Diego was able to demonstrate them prior to the end of the session.  Diego demonstrated good  understanding of the HEP provided.     See EMR under Patient Instructions for exercises provided prior visit.        Assessment     Diego tolerated all treatment tasks well with no complaints of pain.  increased by 5lbs and lateral pinch increased by 2lbs.  Upgraded strengthening exercises, which patient tolerated well.    Diego is progressing well towards his goals and there are no updates to goals at this time. Pt prognosis is Fair.     Pt will continue to benefit from skilled outpatient occupational therapy to address the deficits listed in the problem list on initial evaluation provide  pt/family education and to maximize pt's level of independence in the home and community environment.     Anticipated barriers to occupational therapy: PLOF, PMH/comorbidities    Pt's spiritual, cultural and educational needs considered and pt agreeable to plan of care and goals.    Goals:   LTG's (8 weeks):  1)   Demonstrate improved elbow active extension to -25 degrees to increase functional hand use for ADLs.  - Met 2/16  2)   Demonstrate 20 lbs of right  strength to facilitate independence with activities of daily living  - Met 3/7  3)   Decrease complaints of pain to  2 out of 10 at worst to increase functional hand use for ADL/work/leisure activities.  - Met 2/16  4)   Patient to score at 40% or less on FOTO to demonstrate improved perception of functional RUE use.  - Ongoing   5) Demonstrate no greater than .5 cm difference in edema of Right MPs and elbow crease compared to unaffected side.  - Ongoing      STG's (4 weeks)  1)   Patient to be IND with HEP and modalities for pain/edema managment.  - Ongoing   2)   Demonstrate -35 degrees of active elbow extension to increase functional hand use for ADLs/work/leisure activities.  Met 2/1  3) Demonstrate no greater than 1.0 cm difference in edema of Right MPs and elbow crease compared to unaffected side.  - Ongoing   4)   Decrease complaints of pain to  4 out of 10 at worst to increase functional hand use for ADL/work/leisure activities.  - Met 3/13       Plan     Continue to progress as tolerated.  Updates/Grading for next session: Progress with strengthening and weightbearing as tolerated    Tg Catalan, CODYR/L, CHT

## 2023-03-14 NOTE — PLAN OF CARE
[facility]   Physical Therapy Initial Evaluation       Name: Diego TIRADO [facility] Number: [id number]    Therapy Diagnosis:   Encounter Diagnoses   Name Primary?    Chronic right shoulder pain Yes    Adhesive capsulitis of right shoulder     Winging of scapula     Decreased strength of upper extremity     Decreased range of motion of right shoulder         Physician: Wilmer Muñiz PA*    Physician Orders: PT Eval and Treat   Medical Diagnosis from Referral: Chronic right shoulder pain [M25.511, G89.29], Adhesive capsulitis of right shoulder [M75.01], Winging of scapula [M95.8]  Evaluation Date: 3/14/2023  Authorization Period Expiration: 4/11/2023  Plan of Care Expiration: 5/09/2023  Progress Note Due: 4/04/2023  Visit # / Visits authorized: 1/ 1   FOTO: 1/3    Precautions: Standard and Diabetes     Time In: 9:10 AM  Time Out: 10:01 AM  Total Appointment Time (timed & untimed codes): 51 minutes      SUBJECTIVE     Date of onset: about 3 months ago     History of current condition - Diego reports he had an operation on his forearm about 3 months ago and that is when his shoulder pain started. He was in a sling following surgery to his right arm for about 3 weeks. He reports he has pain trying to lift his shoulder overhead but has no pain carrying heavy things. He has some difficulty combing his hair, putting on shirts, and with all activities that require lifting his arm up overhead.     Pt has difficulty with reporting of symptoms due to dysphagia     Falls: yes a few falls prior to his cervical fusion     Imaging: xray: right shoulder  FINDINGS:  The alignment is within normal limits.  No displaced fractures identified.  No evidence of lytic or blastic lesions.Joint spaces are unremarkable.Soft tissues are unremarkable.Lateral downsloping of acromion with narrowing of osteoacromial outlet.    Prior Therapy: yes   Social History: lives with their spouse  Occupation:  retired  Prior Level of Function: requires assistance for dressing, shopping cooking  Current Level of Function: requires assistance for dressing, shopping cooking    Pain:  Current 0/10, worst 6/10, best 0/10   Location: right shoulder    Description: Aching and Dull  Aggravating Factors: Lifting his arm any direction up overhead  Easing Factors: pain medication and rest    Patients goals: get it working, be able to lift it up overhead     Medical History:   Past Medical History:   Diagnosis Date    Acute encephalopathy     Acute ischemic left MCA stroke     Acute ischemic left MCA stroke 4/28/2022    Anemia     Antiphospholipid syndrome 11/19/2021    Carpal tunnel syndrome on right 1/10/2023    Centrilobular emphysema     COPD (chronic obstructive pulmonary disease)     Cubital tunnel syndrome on right 1/10/2023    Dysphagia     Dysphagia, oropharyngeal 6/17/2016    - improved with speech therapy    Functional belching disorder 12/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 Dr. Pham    Hx of colonic polyps     followed by GI. Dr. Pham    Hyperlipidemia associated with type 2 diabetes mellitus     Hyperlipidemia LDL goal <100     Hypernatremia 5/5/2022    Hypertension associated with type 2 diabetes mellitus     Hypertension associated with type 2 diabetes mellitus     Hypotension     Intermittent confusion 4/29/2022    Overweight(278.02)     Prostate cancer 09/2011    followed by urology, Dr. Rogers    Pulmonary embolism     Reducible right inguinal hernia 10/10/2022    Right hemiparesis 7/22/2022    Secondary to cervical hematoma    Type II or unspecified type diabetes mellitus without mention of complication, not stated as uncontrolled     diet controlled    Urinary retention 4/29/2022       Surgical History:   Diegonaomi Gunter  has a past surgical history that includes Prostatectomy; Cataract extraction, bilateral (2014); Bronchoscopy (N/A, 10/15/2018); Posterior cervical  laminectomy (Bilateral, 4/28/2022); repair, hernia, inguinal, without history of prior repair, age 5 years or older (Right, 10/10/2022); decompression, nerve, ulnar (Right, 1/11/2023); and Carpal tunnel release (Right, 1/11/2023).    Medications:   Diego has a current medication list which includes the following prescription(s): albuterol, aspirin, azelastine, azithromycin, b complex vitamins, ethambutol, nebulizer and compressor, pravastatin, sodium chloride 3%, sodium chloride 7%, and [DISCONTINUED] omega-3 fatty acids.    Allergies:   Review of patient's allergies indicates:  No Known Allergies       OBJECTIVE     Observation: forward head posture, rounded shoulders, winging of right scapula   Posture: moderate kyphosis in sitting and standing    Cervical Range of Motion:    Degrees(%) Pain   Flexion Limited 20%  No - stretch     Extension Limited - achieves about 10 degrees  Yes in back of neck     Right Rotation 45 degrees No - stretch     Left Rotation 45 degrees  No - stretch     Right Side Bending 15 degrees  Stretching on opposite side   Left Side Bending 10 degrees Stretching on opposite side        Passive Range of Motion:   Shoulder Right Left   Flexion 130 - capsular endfeel WNL   Abduction 85 - empty end feel WNL   ER at 45 30 NT   ER at 90 Cannot reach position NT   IR 55 NT      Active Range of Motion:   Shoulder Right Left   Flexion 75 148   Abduction 60 150   Reach behind head L4 T12   Reach behind back  Reaches right ear  Occiput      Strength:  Shoulder Right Left   Flexion Cannot reach test position  4-/5   Abduction Cannot reach test position  4/5   ER 4/5 4+/5   IR 3+/5 4/5       Special Tests:  Impingement Cluster:   Right Left   Hawkin's Kenndy  -   -    Painful Arc Cannot reach position  -   Resisted ER  -   -        Joint Mobility: glenohumeral hypomobility with AP mobilization and inferior mobilization     Palpation: TTP right upper trap, right medial border of scapula    Flexibility:  stretching in anterior shoulder felt with IR, decreased cervical flexibility   Pec Minor: limited bilaterally      Limitation/Restriction for FOTO Shoulder Survey    Therapist reviewed FOTO scores for Diego Gunter on 3/14/2023.   FOTO documents entered into [employer] - see Media section.    Limitation Score: 46%         TREATMENT     Total Treatment time (time-based codes) separate from Evaluation: 15 minutes      Diego received the treatments listed below:      therapeutic exercises to develop strength, endurance, ROM, flexibility, posture, and core stabilization for 10 minutes including:  Seated shoulder flexion table slides x10 reps  Scapular retraction with shoulder external rotation x10 reps  Posterior shoulder rolls x10 reps   Upper trap stretch 10x10 second holds    PATIENT EDUCATION AND HOME EXERCISES     Education provided:   - Pt educated on performance of initial HEP and was instructed to discontinue exercises if increase in pain occurs     Written Home Exercises Provided: yes. Exercises were reviewed and Diego was able to demonstrate them prior to the end of the session.  Diego demonstrated good  understanding of the education provided. See EMR under Patient Instructions for exercises provided during therapy sessions.    ASSESSMENT     Diego is a 82 y.o. male referred to outpatient Physical Therapy with a medical diagnosis of chronic right shoulder pain, adhesive capsulitis, and winging of scapula. Patient presents with postural limitations, decreased shoulder range of motion, decreased upper extremity strength, decreased cervical range of motion, decreased motor control, flexibility limitations, and decreased joint mobility of the right upper extremity. Pt lacks scapular control and winging of the right scapular is present. Pt has severe cervical range of motion limitations partially due to cervical laminectomy performed about a year ago and is currently being treated in occupational therapy  following ulnar nerve decompression and carpal tunnel release. Pt is in minimal pain and is most limited by decreased joint mobility. Pt is an appropriate candidate for physical therapy and will benefit from skilled care including STM, joint mobilization, therapeutic exercise and activity, postural education, balance and gait training, and modalities prn to address limitations and increase functional mobility.     Patient prognosis is Good.   Patient will benefit from skilled outpatient Physical Therapy to address the deficits stated above and in the chart below, provide patient /family education, and to maximize patientt's level of independence.     Plan of care discussed with patient: Yes  Patient's spiritual, cultural and educational needs considered and patient is agreeable to the plan of care and goals as stated below:     Anticipated Barriers for therapy: co-morbidities    Medical Necessity is demonstrated by the following  History  Co-morbidities and personal factors that may impact the plan of care Co-morbidities:   See medical hx    Personal Factors:   no deficits     high   Examination  Body Structures and Functions, activity limitations and participation restrictions that may impact the plan of care Body Regions:   neck  back  upper extremities    Body Systems:    gross symmetry  ROM  strength  gross coordinated movement  motor control    Participation Restrictions:   Limited in participation of ADL's and leisure activities     Activity limitations:   Learning and applying knowledge  no deficits    General Tasks and Commands  undertaking multiple tasks    Communication  communicating with/receiving spoken language    Mobility  lifting and carrying objects  fine hand use (grasping/picking up)    Self care  washing oneself (bathing, drying, washing hands)  caring for body parts (brushing teeth, shaving, grooming)  dressing  looking after one's health    Domestic Life  shopping  cooking  doing house work  (cleaning house, washing dishes, laundry)  assisting others    Interactions/Relationships  no deficits    Life Areas  no deficits    Community and Social Life  community life  recreation and leisure         high   Clinical Presentation evolving clinical presentation with changing clinical characteristics moderate   Decision Making/ Complexity Score: high     Goals:  Short Term Goals:  3 weeks  1.Report decreased right shoulder pain < / =  5/10  to increase tolerance for ADL's  2. Increase PROM shoulder flexion > 130, abduction > 85   3. Increased strength to 3+/5  MMT grade in right shoulder flexion and abduction to increase tolerance for ADL and work activities.  4. Pt to tolerate HEP to improve ROM and independence with ADL's    Long Term Goals: 8 weeks  1.Report decreased right shoulder pain  < / =  2 /10  to increase tolerance for ADL's  2.Increase AROM to > 75 degrees of shoulder flexion, and > 60 degrees of shoulder abduction  3.Increase strength to >/= 4/5 in right shoulder flexion and abduction to increase tolerance for ADL and work activities.  4. 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 Pt will have improved gcode of CJ (20-40% limited) on FOTO shoulder in order to demonstrate true functional improvement.       PLAN   Plan of care Certification: 3/14/2023 to 5?09/2023.    Outpatient Physical Therapy 2 times weekly for 8 weeks to include the following interventions: Cervical/Lumbar Traction, Electrical Stimulation , Manual Therapy, Moist Heat/ Ice, Neuromuscular Re-ed, Patient Education, Self Care, Therapeutic Activities, and Therapeutic Exercise.     Rose Bedoya, PT      I CERTIFY THE NEED FOR THESE SERVICES FURNISHED UNDER THIS PLAN OF TREATMENT AND WHILE UNDER MY CARE   Physician's comments:     Physician's Signature: ___________________________________________________      .asneck

## 2023-03-20 ENCOUNTER — CLINICAL SUPPORT (OUTPATIENT)
Dept: REHABILITATION | Facility: HOSPITAL | Age: 83
End: 2023-03-20
Payer: MEDICARE

## 2023-03-20 DIAGNOSIS — M25.611 DECREASED RANGE OF MOTION OF RIGHT SHOULDER: ICD-10-CM

## 2023-03-20 DIAGNOSIS — R29.898 DECREASED STRENGTH OF UPPER EXTREMITY: Primary | ICD-10-CM

## 2023-03-20 DIAGNOSIS — M25.511 CHRONIC RIGHT SHOULDER PAIN: ICD-10-CM

## 2023-03-20 DIAGNOSIS — M25.621 STIFFNESS OF RIGHT UPPER ARM JOINT: Primary | ICD-10-CM

## 2023-03-20 DIAGNOSIS — G89.29 CHRONIC RIGHT SHOULDER PAIN: ICD-10-CM

## 2023-03-20 DIAGNOSIS — R68.89 DECREASED FUNCTIONAL ACTIVITY TOLERANCE: ICD-10-CM

## 2023-03-20 PROCEDURE — 97140 MANUAL THERAPY 1/> REGIONS: CPT

## 2023-03-20 PROCEDURE — 97110 THERAPEUTIC EXERCISES: CPT

## 2023-03-20 PROCEDURE — 97530 THERAPEUTIC ACTIVITIES: CPT

## 2023-03-20 PROCEDURE — 97018 PARAFFIN BATH THERAPY: CPT | Mod: 59

## 2023-03-20 PROCEDURE — 97112 NEUROMUSCULAR REEDUCATION: CPT | Mod: CQ

## 2023-03-20 NOTE — PROGRESS NOTES
"  Occupational Therapy Daily Treatment Note     Name: Diego [facility]IRADO [facility] Number: [id number]    Therapy Diagnosis:   Encounter Diagnoses   Name Primary?    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, right   G56.21 (ICD-10-CM) - Cubital tunnel syndrome on right   Z98.890 (ICD-10-CM) - Postoperative state      Evaluation Date: 1/27/2023  Plan of Care Certification Date: 04/21/23  Authorization Period: 1/27/23 - 3/31/23  Surgery Date and Procedure: 01/11/23  Right wrist Guyons decompression, carpal tunnel release and cubital tunnel release at the elbow   Date of Return to MD: 02/28/23  FOTO Completion: 1/30/23    Visit #: 15 / 23    Time In: 11:02 am  Time Out: 12:00 pm  Total Billable Time:  58 minutes    Precautions:  Standard      Subjective     Pt reports: "My elbow hurts when I wake up in the morning, but it feels better after 10 min of moving it"  he was compliant with home exercise program given last session.   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     Wound Assessment:   Incisions fully closed, no signs of infection  Size: incision length about 2 inches at elbow, and 1 inch at volar wrist  Location: Right medial elbow, volar wrist    Scar about elbow is supple, able to  and roll  Scar about volar wrist is thickened, limited mobility     Edema: Circumferential measurements: In centimters       Left  1/27/2023 " Right   1/27/2023   MPs 20.8 cm 22.5 cm   Proximal Palmar Crease    NT  NT   Proximal Wrist Crease 16.6 cm 17.2 cm        Left   1/27/2023 Right   1/27/2023   3 Inches distal to elbow crease 23.5 cm 23 cm   Elbow crease 24.7 cm 27.4 cm   3 Inches proximal to elbow crease NT NT         Shoulder AROM: Measured in degrees.    Left  1/27/2023 Right  1/27/2023   Extension/Flexion WFL ~70 flexion   Abduction WFL ~70   IR/ER WFL ~60 ER         Elbow and Wrist ROM. Measured in degrees.    Left  1/27/2023 Right  1/27/2023 Right   2/1/23 Right   2/2 Right:  2/6/23 Right:  2/13/23 Right   2/16 Right Right  2/24/23   Elbow Ext/Flex WNL -45/135  -35 PROM Extension:    -30 AROM  -20 PROM Extension:    Pre tx: -30  After heat, PROM: -15   Extension:    Pre tx: -25  Post tx: -20    PROM -20 Extension:    Pre: -30  Post: -30    PROM: -20 Extension     After heat:  -25 PROM    -22 extension AROM -30  PROM -30    Post tx:  AAROM -20   Supination/Pronation 75/75 75/70          Wrist Ext/Flex 65/70 55/35       50/45   Wrist RD/UD 15/35 15/20                Right  3/13/23   Elbow Extension/Flexion -20/140   Wrist Extension/Flexion 53/45          Hand ROM. Measured in degrees.     Pt unable to achieve full extension of Right hand, secondary to PIP contractures of the RF/SF, and not able to touch DPC with digits but demonstrates WFL for flexion of all digits    Left  1/27/2023 Right  1/27/2023 Right  2/24/23   Thumb: MP                     IP            Rad ADD/ABD            Pal ADD/ABD               Opposition             Dist to DPC 0 cm     4.5 cm 4.0 cm        Sensation: Ulnar Nerve and Median Distribution   2/6/23 2/6/23    Left Right   Monofilament Testing     Normal 1.65-2.83     Diminished Light Touch 3.22-3.61  3.61 on median nerve   Diminished Protective 3.84-4.31  4.31 on ulnar nerve   Loss of Protective 4.56-6.65     Untestable >6.65       STRENGTH: (Measured in pounds using a Dynamometer and pinch meter)   Right Left Right      2/14/23 2/14/23 3/7/23     Setting 2 25, 25, 25 65, 65, 70 25, 25 ,25 10lbs    Average 25 67 25    Key 4 17 6 8lbs   3 Pt NT due to inadequate contact of IF with pinch meter d/t amputation NT     Tip 4 14 7      Special Testing/MMT:  Lumbricals: Observed full AROM bilaterally, but difficulty to decipher true lumbrical activation in the Right hand due to PIP contractures of digits 3-5  Interossei: Limited AROM observed for ab/adduction of digits 2-5 on the Right hand compared to left side  MMT 1st dorsal interossei: 2- / 5 (palpable muscle contraction and limited AROM, unable to take any resistance)  Froment's sign: positive on the Right    3/20/23:  MMT elbow flexion: 4/5  MMT elbow ext: 4/5  No pain, but tenderness in area of triceps insertion        Limitation/Restriction for FOTO Initial Survey     Therapist reviewed FOTO scores for Diego Gunter on 1/27/2023.   FOTO documents entered into [employer] - see Media section.     Limitation Score: 50              Treatment   Diego received the following supervised modalities after being cleared for contraindications for 10 minutes:   - Patient received paraffin bath to RIght hand(s) for 10 minutes with hot pack on top to increase blood flow, circulation, pain management and for tissue elasticity prior to therex.     NT: Diego received the following manual therapy techniques for 10 minutes:   - STM with IASTM tool to volar wrist scar, forearm musculature, and biceps  - Isolated and comp wrist flex/ext stretch    Diego performed therapeutic exercises for 8 minutes including:  - Wrist wheel for wrist/elbow flex/ext (2 min)  - Yellow theraband tricep extension, 3 x 10    Diego participated in dynamic functional therapeutic activities to improve functional performance for 30 minutes, including:  - Resistive gripper for foam piece , silver on 3rd sprung, to facilitate  strength required for ADLs/IADLs (2 sets)  - Placement of pegs to facilitate pinch  grasp and elbow flex/ext required for higher level ADLs (20 pins, 2 sets)  - Peach putty dowel presses for ulnar sided  and lateral pinch x 3 min each    Not performed today:  - Supine on plinth: bilateral chest press with 2 lb dowel, 2 x10  - Supine on plinth: bilateral shoulder flexion with 2 lb dowel,  2 x10  - Supine on plinth: overhead triceps extension with 2 kg yellow ball, 2 x 10  - Yellow theraband triceps extension  2 x20  - Wrist curls flex/ext with 2 lb dumbbell (2 x 10)  - Upgrade to red therapy: , rolls, pinches, ab/adduction  - Red resistive pin for pinch foam piece   - Red flex bar for wrist flex/ext  - Octy (3 min)      Home Exercises and Education Provided     Education provided:   - Progress towards goals     Written Home Exercises Provided: Patient instructed to cont prior HEP.  Exercises were reviewed and Diego was able to demonstrate them prior to the end of the session.  Diego demonstrated good  understanding of the HEP provided.     See EMR under Patient Instructions for exercises provided prior visit.        Assessment     Diego tolerated all treatment tasks well with mild complaints of pain in triceps insertion during theraband task and in shoulder with shoulder flex/elbow ext tasks. Otherwise he tolerated all wrist and /pinch strengthening well. Discussed OT will be decreased to 1x/week and work towards discharging to [facility] over the next couple weeks pending tolerance and progression with strengthening. Patient is in agreement.    Diego is progressing well towards his goals and there are no updates to goals at this time. Pt prognosis is Fair.     Pt will continue to benefit from skilled outpatient occupational therapy to address the deficits listed in the problem list on initial evaluation provide pt/family education and to maximize pt's level of independence in the home and community environment.     Anticipated barriers to occupational therapy: PLOF,  PMH/comorbidities    Pt's spiritual, cultural and educational needs considered and pt agreeable to plan of care and goals.    Goals:   LTG's (8 weeks):  1)   Demonstrate improved elbow active extension to -25 degrees to increase functional hand use for ADLs.  - Met 2/16  2)   Demonstrate 20 lbs of right  strength to facilitate independence with activities of daily living  - Met 3/7  3)   Decrease complaints of pain to  2 out of 10 at worst to increase functional hand use for ADL/work/leisure activities.  - Met 2/16  4)   Patient to score at 40% or less on FOTO to demonstrate improved perception of functional RUE use.  - Ongoing   5) Demonstrate no greater than .5 cm difference in edema of Right MPs and elbow crease compared to unaffected side.  - Ongoing      STG's (4 weeks)  1)   Patient to be IND with HEP and modalities for pain/edema managment.  - Ongoing   2)   Demonstrate -35 degrees of active elbow extension to increase functional hand use for ADLs/work/leisure activities.  Met 2/1  3) Demonstrate no greater than 1.0 cm difference in edema of Right MPs and elbow crease compared to unaffected side.  - Ongoing   4)   Decrease complaints of pain to  4 out of 10 at worst to increase functional hand use for ADL/work/leisure activities.  - Met 3/13       Plan     Continue to progress as tolerated.  Updates/Grading for next session: Progress with strengthening and weightbearing as tolerated    JOEL Miranda/KVNG

## 2023-03-20 NOTE — PROGRESS NOTES
[facility]   Physical Therapy Treatment Note     Name: Diego TIRADO [facility] Number: [id number]    Therapy Diagnosis:   Encounter Diagnoses   Name Primary?    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  He was compliant with home exercise program.  Response to previous treatment: appropriate muscle response  Functional change: none to note today    Pain: 0/10, currently  Location: Right shoulder    Patient goals: get it working, be able to lift it up overhead    OBJECTIVE     Objective Measures updated at progress report unless specified.     Treatment     Diego received the treatments listed below:      therapeutic exercises to develop strength, endurance, ROM, flexibility, posture, and core stabilization for 15 minutes including:  Aerobic Activity to improve UE strength and mobility; UBE for 8 minutes, Level 1 forward  Seated shoulder flexion table slides; 5 second hold, 2 x 10 reps  Posterior shoulder rolls; 10 reps (increased difficulty with technique)  Upper trap stretch; 10 x 10 second holds (unable to perform; discontinue 2/2 cervical fusion)  Seated thoracic extension; 2 minutes, 3 second  hold    neuromuscular re-education activities to improve: Balance, Coordination, Kinesthetic, Sense, Proprioception, and Posture for 30 minutes. The following activities were included:  Scapular retraction with shoulder external rotation; 3 x 10 reps (ball behind thoracic spine; heavy cueing)  Pec stretch in doorway; 2 minutes  SALPD + Yellow TB; 2 x 10 reps  Scapular retractions + Yellow TB; 2 x 10 reps    Patient Education and Home Exercises     Home Exercises Provided and Patient Education Provided     Education provided:   - Compliance with his HEP    Written Home Exercises Provided: Patient instructed to cont prior HEP. Exercises were reviewed and Diego was able to demonstrate them prior to the end of the session.  Diego demonstrated good  understanding of the education provided. See EMR under Patient Instructions for exercises provided during therapy sessions    ASSESSMENT     Mr. Cortez presents for first follow up after initial evaluation. He demonstrates increased difficulty with motor control of scapula requiring heavy verbal and tactile cueing for proper muscle activation during exercise. Visible upper trap compensation observed with forward head and rounded shoulder posture. Continue per POC towards treatment goals.   Diego Is progressing well towards his goals.   Pt prognosis is Good.     Pt will continue to benefit from skilled outpatient physical therapy to address the deficits listed in the problem list box on initial evaluation, provide pt/family education and to maximize pt's level of independence in the home and community environment.     Pt's spiritual, cultural and educational needs considered and pt agreeable to plan of care and goals.     Anticipated barriers to physical therapy: co-morbidities    Goals:   Short Term Goals:  3 weeks  1.Report decreased right shoulder pain < / =  5/10  to increase tolerance for ADL's  2. Increase PROM shoulder flexion > 130, abduction > 85   3. Increased  strength to 3+/5  MMT grade in right shoulder flexion and abduction to increase tolerance for ADL and work activities.  4. Pt to tolerate HEP to improve ROM and independence with ADL's     Long Term Goals: 8 weeks  1.Report decreased right shoulder pain  < / =  2 /10  to increase tolerance for ADL's  2.Increase AROM to > 75 degrees of shoulder flexion, and > 60 degrees of shoulder abduction  3.Increase strength to >/= 4/5 in right shoulder flexion and abduction to increase tolerance for ADL and work activities.  4. 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 Pt will have improved gcode of CJ (20-40% limited) on FOTO shoulder in order to demonstrate true functional improvement.     PLAN     Plan of care Certification: 3/14/2023 to 5/09/2023.     Outpatient Physical Therapy 2 times weekly for 8 weeks to include the following interventions: Cervical/Lumbar Traction, Electrical Stimulation , Manual Therapy, Moist Heat/ Ice, Neuromuscular Re-ed, Patient Education, Self Care, Therapeutic Activities, and Therapeutic Exercise.     Tracey Ruiz, PTA

## 2023-03-24 ENCOUNTER — CLINICAL SUPPORT (OUTPATIENT)
Dept: REHABILITATION | Facility: HOSPITAL | Age: 83
End: 2023-03-24
Payer: MEDICARE

## 2023-03-24 DIAGNOSIS — M25.611 DECREASED RANGE OF MOTION OF RIGHT SHOULDER: ICD-10-CM

## 2023-03-24 DIAGNOSIS — G89.29 CHRONIC RIGHT SHOULDER PAIN: ICD-10-CM

## 2023-03-24 DIAGNOSIS — M25.511 CHRONIC RIGHT SHOULDER PAIN: ICD-10-CM

## 2023-03-24 DIAGNOSIS — R29.898 DECREASED STRENGTH OF UPPER EXTREMITY: Primary | ICD-10-CM

## 2023-03-24 PROCEDURE — 97112 NEUROMUSCULAR REEDUCATION: CPT

## 2023-03-24 PROCEDURE — 97110 THERAPEUTIC EXERCISES: CPT

## 2023-03-24 NOTE — PROGRESS NOTES
[facility]   Physical Therapy Treatment Note     Name: Diego TIRADO [facility] Number: [id number]    Therapy Diagnosis:   Encounter Diagnoses   Name Primary?    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     He was compliant with home exercise program.  Response to previous treatment: appropriate muscle response  Functional change: none to note today    Pain: 0/10, currently  Location: Right shoulder    Patient goals: get it working, be able to lift it up overhead    OBJECTIVE     Objective Measures updated at progress report unless specified.     Treatment     Diego received the treatments listed below:      therapeutic exercises to develop strength, endurance, ROM, flexibility, posture, and core stabilization for 25 minutes including:  Aerobic Activity to improve UE strength and mobility; UBE for 6 minutes, Level 1 forward, backward for 3 minutes added today  Supine cane flexion - stopping at 90 x10 reps, increase to 120 degrees of flexion x10 reps  Supine cane chest press  Supine no moneys YTB  Seated shoulder  flexion table slides; 5 second hold, 2 x 10 reps  Posterior shoulder rolls; 10 reps (increased difficulty with technique) - NT  Pulleys shoulder flexion and abduction x4 minutes   Seated thoracic extension; 2 minutes, 3 second hold    neuromuscular re-education activities to improve: Balance, Coordination, Kinesthetic, Sense, Proprioception, and Posture for 24 minutes. The following activities were included:  Scapular retraction with shoulder external rotation; 3 x 10 reps (ball behind thoracic spine; heavy cueing)  Pec stretch in doorway; 2 minutes - NT  SALPD + Yellow TB; 2 x 10 reps - NT  Scapular retractions + Yellow TB; 2 x 10 reps   Resisted scapular retraction no money with no resistance - therapist resistance     Not Today:  Upper trap stretch; 10 x 10 second holds (unable to perform; discontinue 2/2 cervical fusion)    manual therapy techniques: Joint mobilizations, Manual traction, and Soft tissue Mobilization were applied to the: L knee for 10 minutes, including:  Glenohumeral AP mobilization to R shoulder   Right shoulder PROM and stretching into flexion and abduction   Manual pec stretch with pt seated with half foam roll mid back     Patient Education and Home Exercises     Home Exercises Provided and Patient Education Provided     Education provided:   - Compliance with his HEP    Written Home Exercises Provided: Patient instructed to cont prior HEP. Exercises were reviewed and Diego was able to demonstrate them prior to the end of the session.  Diego demonstrated good  understanding of the education provided. See EMR under Patient Instructions for exercises provided during therapy sessions    ASSESSMENT     Good tolerance to session with continuation of thoracic and upper extremity mobility exercises, continued difficulty with scapular motor control but improvement is noted with manual resistance today. Pt requires moderate verbal cueing to decrease upper trap compensation throughout exercise.  Manual therapy and joint mobilization was well tolerated and should be continued at next session. Continue per POC as tolerated with focus on joint mobility and scapular strengthening.    Diego Is progressing well towards his goals.   Pt prognosis is Good.     Pt will continue to benefit from skilled outpatient physical therapy to address the deficits listed in the problem list box on initial evaluation, provide pt/family education and to maximize pt's level of independence in the home and community environment.     Pt's spiritual, cultural and educational needs considered and pt agreeable to plan of care and goals.     Anticipated barriers to physical therapy: co-morbidities    Goals:   Short Term Goals:  3 weeks  1.Report decreased right shoulder pain < / =  5/10  to increase tolerance for ADL's  2. Increase PROM shoulder flexion > 130, abduction > 85   3. Increased strength to 3+/5  MMT grade in right shoulder flexion and abduction to increase tolerance for ADL and work activities.  4. Pt to tolerate HEP to improve ROM and independence with ADL's     Long Term Goals: 8 weeks  1.Report decreased right shoulder pain  < / =  2 /10  to increase tolerance for ADL's  2.Increase AROM to > 75 degrees of shoulder flexion, and > 60 degrees of shoulder abduction  3.Increase strength to >/= 4/5 in right shoulder flexion and abduction to increase tolerance for ADL and work activities.  4. 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 Pt will have improved gcode of CJ (20-40% limited) on FOTO shoulder in order to demonstrate true functional improvement.     PLAN     Plan of care Certification: 3/14/2023 to 5/09/2023.     Outpatient Physical Therapy 2 times weekly for 8 weeks to include the following interventions: Cervical/Lumbar Traction, Electrical Stimulation , Manual Therapy, Moist Heat/ Ice, Neuromuscular Re-ed, Patient Education, Self Care, Therapeutic Activities, and  Therapeutic Exercise.     Rose Bedoya, PT

## 2023-03-27 ENCOUNTER — CLINICAL SUPPORT (OUTPATIENT)
Dept: REHABILITATION | Facility: HOSPITAL | Age: 83
End: 2023-03-27
Payer: MEDICARE

## 2023-03-27 DIAGNOSIS — R68.89 DECREASED FUNCTIONAL ACTIVITY TOLERANCE: ICD-10-CM

## 2023-03-27 DIAGNOSIS — M25.621 STIFFNESS OF RIGHT UPPER ARM JOINT: Primary | ICD-10-CM

## 2023-03-27 PROCEDURE — 97022 WHIRLPOOL THERAPY: CPT

## 2023-03-27 PROCEDURE — 97140 MANUAL THERAPY 1/> REGIONS: CPT

## 2023-03-27 PROCEDURE — 97110 THERAPEUTIC EXERCISES: CPT

## 2023-03-27 NOTE — PROGRESS NOTES
"  Occupational Therapy Daily Treatment Note     Name: Diego Gunter  [facility] Number: [id number]    Therapy Diagnosis:   Encounter Diagnoses   Name Primary?    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) - Cubital tunnel syndrome on right   Z98.890 (ICD-10-CM) - Postoperative state      Evaluation Date: 1/27/2023  Plan of Care Certification Date: 04/21/23  Authorization Period: 1/27/23 - 3/31/23  Surgery Date and Procedure: 01/11/23  Right wrist Guyons decompression, carpal tunnel release and cubital tunnel release at the elbow   Date of Return to MD: 02/28/23  FOTO Completion: 1/30/23    Visit #: 16 / 23    Time In: 11:02 am  Time Out: 11:58 am  Total Billable Time:  56 minutes    Precautions:  Standard      Subjective     Pt reports: "My elbow doesn't hurt in the morning anymore"  he was compliant with home exercise program given last session.   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     Wound Assessment:   Incisions fully closed, no signs of infection  Size: incision length about 2 inches at elbow, and 1 inch at volar wrist  Location: Right medial elbow, volar wrist    Scar about elbow is supple, able to  and roll  Scar about volar wrist is thickened, limited mobility     Edema: Circumferential measurements: In centimters       Left  1/27/2023 Right   1/27/2023 Right  3/27/23   MPs 20.8 cm 22.5 cm 21.8 cm   Proximal Palmar Crease    NT  NT NT   Proximal Wrist Crease " 16.6 cm 17.2 cm 16.6 cm        Left   1/27/2023 Right   1/27/2023 Right  3/27/23   3 Inches distal to elbow crease 23.5 cm 23 cm    Elbow crease 24.7 cm 27.4 cm 25.9 cm         Shoulder AROM: Measured in degrees.    Left  1/27/2023 Right  1/27/2023   Extension/Flexion WFL ~70 flexion   Abduction WFL ~70   IR/ER WFL ~60 ER         Elbow and Wrist ROM. Measured in degrees.    Left  1/27/2023 Right  1/27/2023 Right   2/1/23 Right   2/2 Right:  2/6/23 Right:  2/13/23 Right   2/16 Right Right  2/24/23   Elbow Ext/Flex WNL -45/135  -35 PROM Extension:    -30 AROM  -20 PROM Extension:    Pre tx: -30  After heat, PROM: -15   Extension:    Pre tx: -25  Post tx: -20    PROM -20 Extension:    Pre: -30  Post: -30    PROM: -20 Extension     After heat:  -25 PROM    -22 extension AROM -30  PROM -30    Post tx:  AAROM -20   Supination/Pronation 75/75 75/70          Wrist Ext/Flex 65/70 55/35       50/45   Wrist RD/UD 15/35 15/20                Right  3/13/23 Right  3/27/23   Elbow Extension/Flexion -20/140 -25/145   Supination/Pronation  WFL   Wrist Extension/Flexion 53/45 55/45       Hand ROM. Measured in degrees.     Pt unable to achieve full extension of Right hand, secondary to PIP contractures of the RF/SF, and not able to touch DPC with digits but demonstrates WFL for flexion of all digits    Left  1/27/2023 Right  1/27/2023 Right  2/24/23   Thumb: MP                     IP            Rad ADD/ABD            Pal ADD/ABD               Opposition             Dist to DPC 0 cm     4.5 cm 4.0 cm        Sensation: Ulnar Nerve and Median Distribution   2/6/23 2/6/23 3/27/23    Left Right Right   Monofilament Testing      Normal 1.65-2.83   2.83 Median nerve   Diminished Light Touch 3.22-3.61  3.61 on median nerve    Diminished Protective 3.84-4.31  4.31 on ulnar nerve 4.31 Ulnar nerve   Loss of Protective 4.56-6.65      Untestable >6.65        STRENGTH: (Measured in pounds using a Dynamometer and pinch meter)   Right Left Right   Right    2/14/23 2/14/23 3/7/23  3/27/23    Setting 2 25, 25, 25 65, 65, 70 25, 25 ,25 10lbs 28, 27, 30    Average 25 67 25  28 lb   Key 4 17 6 8lbs 8 lb   3 Pt NT due to inadequate contact of IF with pinch meter d/t amputation NT      Tip 4 14 7  6 lb     Special Testing/MMT:  Lumbricals: Observed full AROM bilaterally, but difficulty to decipher true lumbrical activation in the Right hand due to PIP contractures of digits 3-5  Interossei: Limited AROM observed for ab/adduction of digits 2-5 on the Right hand compared to left side  MMT 1st dorsal interossei: 2- / 5 (palpable muscle contraction and limited AROM, unable to take any resistance)  Froment's sign: positive on the Right    3/20/23:  MMT elbow flexion: 4/5  MMT elbow ext: 4/5  No pain, but tenderness in area of triceps insertion        Limitation/Restriction for FOTO Initial Survey     Therapist reviewed FOTO scores for Diego Gunter on 1/27/2023.   FOTO documents entered into [employer] - see Media section.     Limitation Score: 50              Treatment   Diego received the following supervised modalities after being cleared for contraindications for 10 minutes:   - Fluidotherapy - 115 degrees and 50 speed, to Right hand for 10 minutes for increased blood flow and circulation, increased tissue extensibility, and pain management. Patient instructed to perform AROM.    Diego received the following manual therapy techniques for 8 minutes:   - STM with vibration tool to volar wrist scar and surrounding area    Diego performed therapeutic exercises for 38 minutes including:  - Reassessment of objective measurements  - Red and green theraputty , rolls, tip pinches, intrinsic pinches, ab/adduction  - Wrist curls flex/ext with 2 lb dumbbell (2 x 10)    Provided with finalized HEP to include red and green theraputty , pinches, adduction, and wrist curls with 2# dumbbell. Instructed to begin by performing alternating days then may perform each  1x/day pending tolerance and no pain.    Home Exercises and Education Provided     Education provided:   - Discharge to [facility], self progression with HEP at home    Written Home Exercises Provided: yes. Red/green theraputty exercises and dumbbell wrist curls  Exercises were reviewed and Diego was able to demonstrate them prior to the end of the session.  Diego demonstrated good  understanding of the HEP provided.     See EMR under Patient Instructions for exercises provided  this visit .        Assessment     Diego tolerated all treatment tasks and progression of strengthening well with no complaints of pain. Objective measurements indicate plateau in improvement for elbow/wrist/hand ROM and slow but steady improvement with  strength. His edema compared to unaffected side greatly improved for elbow and wrist as well. He met all STG/LTG with exception of being within 1.2cm compared to unaffected side for elbow crease edema but greatly improved since initial evaluation. He demonstrates independence with HEP and will continue to progress with strengthening at home. He knows to call with any questions or concerns.    Anticipated barriers to occupational therapy: PLOF, PMH/comorbidities    Pt's spiritual, cultural and educational needs considered and pt agreeable to plan of care and goals.    Goals:   LTG's (8 weeks):  1)   Demonstrate improved elbow active extension to -25 degrees to increase functional hand use for ADLs.  - Met 2/16  2)   Demonstrate 20 lbs of right  strength to facilitate independence with activities of daily living  - Met 3/7  3)   Decrease complaints of pain to  2 out of 10 at worst to increase functional hand use for ADL/work/leisure activities.  - Met 2/16  4)   Patient to score at 40% or less on FOTO to demonstrate improved perception of functional RUE use.  - Not reassessed  5) Demonstrate no greater than .5 cm difference in edema of Right MPs and elbow crease compared to unaffected  side.  - Met 3/2723     STG's (4 weeks)  1)   Patient to be IND with HEP and modalities for pain/edema managment.  - Met 3/27/23  2)   Demonstrate -35 degrees of active elbow extension to increase functional hand use for ADLs/work/leisure activities.  Met 2/1  3) Demonstrate no greater than 1.0 cm difference in edema of Right MPs and elbow crease compared to unaffected side.  - Partially met for elbow crease, 1.2 cm difference for MPs 3/27/23  4)   Decrease complaints of pain to  4 out of 10 at worst to increase functional hand use for ADL/work/leisure activities.  - Met 3/13       Plan     Plan to discharge to [facility] due to independence with HEP and meeting STG/LTG    Destiney Hankins OTR/L

## 2023-03-27 NOTE — PATIENT INSTRUCTIONS
[facility]  OCCUPATIONAL THERAPY  HOME EXERCISE PROGRAM     Resisted    Squeeze putty using thumb and all fingers.  Perform 2 - 3 minutes of gripping.      Resisted Lumbrical   Bending only at large knuckles, press putty down against thumb. Keep fingertips straight.  Perform 10 reps.      Resisted Lateral/Key Pinch  Squeeze between thumb and side of index finger.  Perform 10-20 reps    Resisted 3-Jaw and 2pt Pinch   Pinch putty between tip of thumb and tip of index/long fingers. Pinch putty between tip of thumb and tip of index.   Perform 10 - 20 reps      Resisted Digit Adduction  Press between 2 fingers. Repeat with all fingers.  Perform 10 reps between each finger.        Complete the following strengthening exercises using 2 pound weight.  Do 10 repetitions of each, 1x per day.         Resisted Wrist Flexion  With palm up and weight in hand, bend wrist up. Return slowly.      Resisted Wrist Extension  With palm down and weight in hand, bend wrist up. Then bend wrist down.      Copyright © I. All rights reserved.     JOEL Miranda/L

## 2023-03-28 ENCOUNTER — TELEPHONE (OUTPATIENT)
Dept: ADMINISTRATIVE | Facility: CLINIC | Age: 83
End: 2023-03-28
Payer: MEDICARE

## 2023-03-30 ENCOUNTER — OFFICE VISIT (OUTPATIENT)
Dept: FAMILY MEDICINE | Facility: CLINIC | Age: 83
End: 2023-03-30
Payer: MEDICARE

## 2023-03-30 ENCOUNTER — CLINICAL SUPPORT (OUTPATIENT)
Dept: REHABILITATION | Facility: HOSPITAL | Age: 83
End: 2023-03-30
Payer: MEDICARE

## 2023-03-30 VITALS
WEIGHT: 160.94 LBS | SYSTOLIC BLOOD PRESSURE: 104 MMHG | OXYGEN SATURATION: 95 % | HEIGHT: 69 IN | DIASTOLIC BLOOD PRESSURE: 62 MMHG | BODY MASS INDEX: 23.84 KG/M2 | TEMPERATURE: 98 F | HEART RATE: 64 BPM

## 2023-03-30 DIAGNOSIS — Z71.89 ADVANCED DIRECTIVES, COUNSELING/DISCUSSION: ICD-10-CM

## 2023-03-30 DIAGNOSIS — R29.898 DECREASED STRENGTH OF UPPER EXTREMITY: Primary | ICD-10-CM

## 2023-03-30 DIAGNOSIS — E78.49 OTHER HYPERLIPIDEMIA: ICD-10-CM

## 2023-03-30 DIAGNOSIS — M25.511 CHRONIC RIGHT SHOULDER PAIN: ICD-10-CM

## 2023-03-30 DIAGNOSIS — G31.84 MILD COGNITIVE IMPAIRMENT: ICD-10-CM

## 2023-03-30 DIAGNOSIS — R73.03 PREDIABETES: ICD-10-CM

## 2023-03-30 DIAGNOSIS — F41.9 ANXIETY: ICD-10-CM

## 2023-03-30 DIAGNOSIS — M25.611 DECREASED RANGE OF MOTION OF RIGHT SHOULDER: ICD-10-CM

## 2023-03-30 DIAGNOSIS — R41.841 COGNITIVE COMMUNICATION DEFICIT: ICD-10-CM

## 2023-03-30 DIAGNOSIS — Z74.09 IMPAIRED MOBILITY: ICD-10-CM

## 2023-03-30 DIAGNOSIS — Z00.00 ENCOUNTER FOR PREVENTIVE HEALTH EXAMINATION: Primary | ICD-10-CM

## 2023-03-30 DIAGNOSIS — I70.0 ATHEROSCLEROSIS OF AORTA: ICD-10-CM

## 2023-03-30 DIAGNOSIS — G89.29 CHRONIC RIGHT SHOULDER PAIN: ICD-10-CM

## 2023-03-30 PROCEDURE — 3078F DIAST BP <80 MM HG: CPT | Mod: CPTII,S$GLB,, | Performed by: NURSE PRACTITIONER

## 2023-03-30 PROCEDURE — 3074F SYST BP LT 130 MM HG: CPT | Mod: CPTII,S$GLB,, | Performed by: NURSE PRACTITIONER

## 2023-03-30 PROCEDURE — 97140 MANUAL THERAPY 1/> REGIONS: CPT

## 2023-03-30 PROCEDURE — 1157F ADVNC CARE PLAN IN RCRD: CPT | Mod: CPTII,S$GLB,, | Performed by: NURSE PRACTITIONER

## 2023-03-30 PROCEDURE — 1170F PR FUNCTIONAL STATUS ASSESSED: ICD-10-PCS | Mod: CPTII,S$GLB,, | Performed by: NURSE PRACTITIONER

## 2023-03-30 PROCEDURE — 97112 NEUROMUSCULAR REEDUCATION: CPT

## 2023-03-30 PROCEDURE — 1159F MED LIST DOCD IN RCRD: CPT | Mod: CPTII,S$GLB,, | Performed by: NURSE PRACTITIONER

## 2023-03-30 PROCEDURE — 1170F FXNL STATUS ASSESSED: CPT | Mod: CPTII,S$GLB,, | Performed by: NURSE PRACTITIONER

## 2023-03-30 PROCEDURE — 99999 PR PBB SHADOW E&M-EST. PATIENT-LVL V: CPT | Mod: PBBFAC,,, | Performed by: NURSE PRACTITIONER

## 2023-03-30 PROCEDURE — G0439 PPPS, SUBSEQ VISIT: HCPCS | Mod: S$GLB,,, | Performed by: NURSE PRACTITIONER

## 2023-03-30 PROCEDURE — 1157F PR ADVANCE CARE PLAN OR EQUIV PRESENT IN MEDICAL RECORD: ICD-10-PCS | Mod: CPTII,S$GLB,, | Performed by: NURSE PRACTITIONER

## 2023-03-30 PROCEDURE — 1159F PR MEDICATION LIST DOCUMENTED IN MEDICAL RECORD: ICD-10-PCS | Mod: CPTII,S$GLB,, | Performed by: NURSE PRACTITIONER

## 2023-03-30 PROCEDURE — G0439 PR MEDICARE ANNUAL WELLNESS SUBSEQUENT VISIT: ICD-10-PCS | Mod: S$GLB,,, | Performed by: NURSE PRACTITIONER

## 2023-03-30 PROCEDURE — 97110 THERAPEUTIC EXERCISES: CPT

## 2023-03-30 PROCEDURE — 3078F PR MOST RECENT DIASTOLIC BLOOD PRESSURE < 80 MM HG: ICD-10-PCS | Mod: CPTII,S$GLB,, | Performed by: NURSE PRACTITIONER

## 2023-03-30 PROCEDURE — 99999 PR PBB SHADOW E&M-EST. PATIENT-LVL V: ICD-10-PCS | Mod: PBBFAC,,, | Performed by: NURSE PRACTITIONER

## 2023-03-30 PROCEDURE — 3074F PR MOST RECENT SYSTOLIC BLOOD PRESSURE < 130 MM HG: ICD-10-PCS | Mod: CPTII,S$GLB,, | Performed by: NURSE PRACTITIONER

## 2023-03-30 RX ORDER — AMIKACIN 590 MG/8.4ML
SUSPENSION RESPIRATORY (INHALATION)
COMMUNITY
Start: 2023-03-16

## 2023-03-30 NOTE — PROGRESS NOTES
HPI     Chief Complaint:  AWV    Diego Gunter is a 82 y.o. male with multiple medical diagnoses as listed in the medical history and problem list that presented for a Medicare AWV and comprehensive Health Risk Assessment today.  Pt is new to me but is known to this clinic with his last appointment being 3/6/2023.      The following components were reviewed and updated:    Medical history  Family History  Social history  Allergies and Current Medications  Health Risk Assessment  Health Maintenance  Care Team           Assessment & Plan   (all problems are new to me)    Diagnoses and health risks identified today and associated recommendations/orders:    Problem List Items Addressed This Visit          Neuro    Mild cognitive impairment    The current medical regimen is effective;  continue present plan      Overview     See 4/2020 Neuropsych note         Cognitive communication deficit    The current medical regimen is effective;  continue present plan         Psychiatric    Anxiety    Encouraged the patient to perform self-calming techniques, such as deep breathing/relaxation techniques and exercise.         Cardiac/Vascular    Other hyperlipidemia    discussed ways to lower triglycerides such as cutting simple sugars out of diet (white breads, candies, cookies, cakes, etc.) and reducing/eliminating intake of highly processed trans fatty acids.   Exercise 30 minutes a day for 4-5 days a week.   Eat more fiber.      Atherosclerosis of aorta    -assessed and addressed all modifiable risk factors.  Continue with appropriate management to prevent complications with regards to lipid and BP monitoring.      Overview     - noted on CT chest 9/2018            Endocrine    Prediabetes    Patient does have a Hx of prediabtes, which we discussed increased risk for diabetes in the future, therefore, I recommend dietary modification such as lowering carbohydrates in diet (pasta, rice, bread, potatoes, crackers, cookies,  candy, soda, etc.) to decrease the risk of progression to diabetes.          Overview     - hx of diet controlled type 2 diabetes - resolved 3/2022 due to A1c < 6.5 for at least 5 years without medication  - not on ace inhibitor  (has intermittent high potassium level)                Other    Impaired mobility    The current medical regimen is effective;  continue present plan       Other Visit Diagnoses       Encounter for preventive health examination    -  Primary    Counseled on age appropriate medical preventative services including age appropriate cancer screenings, age appropriate eye and dental exams, over all nutritional health, need for a consistent exercise regimen, and an over all push towards maintaining a vigorous and active lifestyle.  Counseled on age appropriate vaccines and discussed upcoming health care needs based on age/gender. Discussed good sleep hygiene and stress management.      Advanced directives, counseling/discussion        I offered to discuss advanced care planning, including how to pick a person who would make decisions for you if you were unable to make them for yourself, called a health care power of , and what kind of decisions you might make such as use of life sustaining treatments such as ventilators and tube feeding when faced with a life limiting illness recorded on a living will that they will need to know. (How you want to be cared for as you near the end of your natural life)     X Patient is interested in learning more about how to make advanced directives.  I provided them paperwork and offered to discuss this with them.      Relevant Orders    Ambulatory referral/consult to the [facility]              --------------------------------------------    ** See Completed Assessments for Annual Wellness Visit within the encounter summary.**    The following assessments were completed:  Living Situation  CAGE  Depression Screening  Timed Get Up and  "Go  Whisper Test  Cognitive Function Screening  Nutrition Screening  ADL Screening  PAQ Screening      Provided Diego Gunter  with a 5-10 year written screening schedule and personal prevention plan. Recommendations were developed using the USPSTF age appropriate recommendations. Education, counseling, and referrals were provided as needed. After Visit Summary printed and given to patient which includes a list of additional screenings\tests needed.    Exam     Review of Systems:  (as noted above)  Review of Systems   Constitutional:  Negative for fever.   HENT:  Positive for hearing loss (bilateral). Negative for trouble swallowing.    Eyes:  Negative for visual disturbance.   Respiratory:  Negative for chest tightness and shortness of breath.    Cardiovascular:  Negative for chest pain.   Gastrointestinal:  Negative for blood in stool.   Neurological:         Tingling right hand       Physical Exam:   Physical Exam  Constitutional:       General: He is not in acute distress.     Appearance: He is not ill-appearing or diaphoretic.   HENT:      Head: Normocephalic and atraumatic.      Ears:      Comments: Decreased hearing bilaterally    Eyes:      General: No scleral icterus.  Cardiovascular:      Rate and Rhythm: Normal rate and regular rhythm.      Pulses: Normal pulses.      Heart sounds: No murmur heard.    No friction rub. No gallop.   Pulmonary:      Effort: No respiratory distress.   Chest:      Chest wall: No tenderness.   Musculoskeletal:      Cervical back: No rigidity.   Skin:     Capillary Refill: Capillary refill takes 2 to 3 seconds.   Neurological:      General: No focal deficit present.      Mental Status: He is alert and oriented to person, place, and time.     Vitals:    03/30/23 1603   BP: 104/62   BP Location: Right arm   Patient Position: Sitting   BP Method: Medium (Manual)   Pulse: 64   Temp: 97.7 °F (36.5 °C)   TempSrc: Oral   SpO2: 95%   Weight: 73 kg (160 lb 15 oz)   Height: 5' 9" " (1.753 m)      Body mass index is 23.77 kg/m².    Clock:                Health Maintenance:  Health Maintenance         Date Due Completion Date    COVID-19 Vaccine (4 - Booster for Pfizer series) 12/31/2021 11/5/2021    Lipid Panel 03/08/2024 3/8/2023    Hemoglobin A1c 03/08/2024 3/8/2023    TETANUS VACCINE 03/08/2025 3/8/2015            Advised patient on the importance of completing overdue health maintenance items      Follow Up:  Follow up in about 3 months (around 6/30/2023), or if symptoms worsen or fail to improve.    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 Dr. Pham    Hx of colonic polyps     followed by GI. Dr. Pham    Hyperlipidemia associated with type 2 diabetes mellitus     Hyperlipidemia LDL goal <100     Hypernatremia 5/5/2022    Hypertension associated with type 2 diabetes mellitus     Hypertension associated with type 2 diabetes mellitus     Hypotension     Intermittent confusion 4/29/2022    Overweight(278.02)     Prostate cancer 09/2011    followed by urology, Dr. Rogers    Pulmonary embolism     Reducible right inguinal hernia 10/10/2022    Right hemiparesis 7/22/2022    Secondary to cervical hematoma    Type II or unspecified type diabetes mellitus without mention of complication, not stated as uncontrolled     diet controlled    Urinary retention 4/29/2022       Past Surgical History:  Past Surgical History:   Procedure Laterality Date     BRONCHOSCOPY N/A 10/15/2018    Procedure: BRONCHOSCOPY;  Surgeon: Pratibha Diagnostic Provider;  [facility];  Service: Anesthesiology;  Laterality: N/A;    CARPAL TUNNEL RELEASE Right 2023    Procedure: RELEASE, CARPAL TUNNEL;  Surgeon: Romero Lazo MD;  Location: [facility] OR;  Service: Orthopedics;  Laterality: Right;    CATARACT EXTRACTION, BILATERAL      DECOMPRESSION, NERVE, ULNAR Right 2023    Procedure: DECOMPRESSION, NERVE, ULNAR - right cub joile and guyon's decompression as well as open right carpal tunnel release;  Surgeon: Romero Lazo MD;  Location: [facility] OR;  Service: Orthopedics;  Laterality: Right;    POSTERIOR CERVICAL LAMINECTOMY Bilateral 2022    Procedure: LAMINECTOMY, SPINE, CERVICAL, POSTERIOR APPROACH C3-6;  Surgeon: Carlos Miller MD;  [facility];  Service: Neurosurgery;  Laterality: Bilateral;    PROSTATECTOMY      REPAIR, HERNIA, INGUINAL, WITHOUT HISTORY OF PRIOR REPAIR, AGE 5 YEARS OR OLDER Right 10/10/2022    Procedure: REPAIR, HERNIA, INGUINAL, WITHOUT HISTORY OF PRIOR REPAIR, AGE 5 YEARS OR OLDER;  Surgeon: Dario Esquivel MD;  Location: [facility] OR [address] [place] [address];  Service: General;  Laterality: Right;  open right inguinal hernia repair with mesh       Social History:  Social History     Socioeconomic History    Marital status:      Spouse name: Addis    Number of children: 2   Occupational History    Occupation: tool    Tobacco Use    Smoking status: Former     Packs/day: 0.25     Years: 5.00     Pack years: 1.25     Types: Cigarettes     Quit date: 10/2/1962     Years since quittin.5    Smokeless tobacco: Former    Tobacco comments:     quit age 23   Substance and Sexual Activity    Alcohol use: Not Currently    Drug use: No    Sexual activity: Yes     Partners: Female     Social Determinants of Health     Financial Resource Strain: Low Risk     Difficulty of Paying Living Expenses: Not hard at all   Food Insecurity: No  Food Insecurity    Worried About Running Out of Food in the Last Year: Never true    Ran Out of Food in the Last Year: Never true   Transportation Needs: No Transportation Needs    Lack of Transportation (Medical): No    Lack of Transportation (Non-Medical): No   Physical Activity: Inactive    Days of Exercise per Week: 0 days    Minutes of Exercise per Session: 10 min   Stress: No Stress Concern Present    Feeling of Stress : Only a little   Social Connections: Moderately Integrated    Frequency of Communication with Friends and Family: Twice a week    Frequency of Social Gatherings with Friends and Family: Once a week    Attends Church Services: More than 4 times per year    Active Member of Clubs or Organizations: No    Attends Club or Organization Meetings: Never    Marital Status:    Housing Stability: Low Risk     Unable to Pay for Housing in the Last Year: No    Number of Places Lived in the Last Year: 1    Unstable Housing in the Last Year: No       Family History:  Family History   Problem Relation Age of Onset    Colon cancer Mother     Diabetes Mother     Heart disease Father     Mental illness Sister     Diabetes Sister     Other Brother         polio as a child    Pulmonary fibrosis Brother     Diabetes Brother     Myasthenia gravis Brother     Parkinsonism Brother     Diabetes Brother     Dementia Brother     Lung cancer Brother         smoker    Diabetes Maternal Aunt     Diabetes Other     Esophageal cancer Neg Hx        Allergies and Medications: (updated and reviewed)  Review of patient's allergies indicates:  No Known Allergies  Current Outpatient Medications   Medication Sig Dispense Refill    albuterol (VENTOLIN HFA) 90 mcg/actuation inhaler Inhale 2 puffs into the lungs daily as needed (30 min before arikayce). Rescue 18 g 11    ARIKAYCE 590 mg/8.4 mL NbSp       aspirin (ECOTRIN) 81 MG EC tablet Take 81 mg by mouth once daily.      b complex vitamins capsule Take 1 capsule by mouth  once daily.      ethambutoL (MYAMBUTOL) 400 MG Tab Take 2 tablets (800 mg total) by mouth once daily. 60 tablet 5    nebulizer and compressor ([employer] COMPRESSOR SYSTEM) Marcy use to administer nebulized medication as directed 1 each 0    pravastatin (PRAVACHOL) 10 MG tablet ONE TABLET BY MOUTH once a day 90 tablet 0    sodium chloride 3% 3 % nebulizer solution USE 4 ML VIAL IN NEBULIZER  TWICE DAILY 480 mL 11    sodium chloride 7% 7 % nebulizer solution use one vial in nebulizer twice a day      azelastine (ASTELIN) 137 mcg (0.1 %) nasal spray 2 sprays (274 mcg total) by Nasal route 2 (two) times daily. (Patient not taking: Reported on 3/6/2023) 30 mL 0    azithromycin (ZITHROMAX) 500 MG tablet Take 500 mg by mouth once daily.       No current facility-administered medications for this visit.           - The patient is given an After Visit Summary that lists all medications with directions, allergies, education, orders placed during this encounter and follow-up instructions.      - I have reviewed the patient's medical information including past medical, family, and social history sections including the medications and allergies.      - We discussed the patient's current medications.     This note was created by combination of typed  and MModal dictation.  Transcription errors may be present.  If there are any questions, please contact me.       Zachariah Bedoya NP

## 2023-03-30 NOTE — PROGRESS NOTES
[facility]   Physical Therapy Treatment Note     Name: Diego TIRADO [facility] Number: [id number]    Therapy Diagnosis:   Encounter Diagnoses   Name Primary?    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     He was compliant with home exercise program.  Response to previous treatment: appropriate muscle response  Functional change: none to note today    Pain: 0/10, currently  Location: Right shoulder    Patient goals: get it working, be able to lift it up overhead    OBJECTIVE     Objective Measures updated at progress report unless specified.     Treatment     Diego received the treatments listed below:      therapeutic exercises to develop strength, endurance, ROM, flexibility, posture, and core stabilization for 20 minutes including:  Aerobic Activity to improve UE strength and mobility; UBE for 8 minutes, Level 2 forward, backward for 4 minutes added today  Supine cane flexion - improved range to to 140 degrees of flexion 2x10 reps - very little pain today  Supine 2# weighted bar chest press 2x10   2#  weighted bar shoulder flexion in sitting 2x10 - pt can reach about 60 degrees of flexion before pain occurs  Pulleys shoulder flexion and abduction x4 minutes   Seated thoracic extension; 2 minutes, 3 second hold - hands on shoulder - cannot reach behind head      Not today:  Seated shoulder flexion table slides; 5 second hold, 2 x 10 reps    neuromuscular re-education activities to improve: Balance, Coordination, Kinesthetic, Sense, Proprioception, and Posture for 25 minutes. The following activities were included:  Posterior shoulder rolls; 10 reps (increased difficulty with technique) - tactile cueing improves scapular retraction  Seated no moneys RTB - tactile and verbal cues for scapular retraction  2x10   Standing rows with GTB 2x10     Not today:  Scapular retraction with shoulder external rotation; 3 x 10 reps (ball behind thoracic spine; heavy cueing)  Pec stretch in doorway; 2 minutes - NT  SALPD + Yellow TB; 2 x 10 reps - NT  Resisted scapular retraction no money with no resistance - therapist resistance     manual therapy techniques: Joint mobilizations, Manual traction, and Soft tissue Mobilization were applied to the: L knee for 15 minutes, including:  Glenohumeral AP mobilization to R shoulder   Right shoulder PROM and stretching into flexion and abduction   Manual pec stretch with pt seated with half foam roll mid back     Patient Education and Home Exercises     Home Exercises Provided and Patient Education Provided     Education provided:   - Compliance with his HEP    Written Home Exercises Provided: Patient instructed to cont prior HEP. Exercises were reviewed and Diego was able to demonstrate them prior to the end of the session.  Diego demonstrated good  understanding of the education provided. See EMR under Patient Instructions for exercises provided during therapy sessions    ASSESSMENT     Good tolerance to session with improvements in AAROM into shoulder flexion and abduction with minimal  pain. Pt requires maximal cueing to achieves scapular retraction with improvements in motor control seen during resistive exercises. Pt will benefit from continued focus on scapular strengthening and inclusion of serratus anterior strength activities to decrease scapular winging as well as continuation of glenohumeral joint mobilization to improve mobility. Continue per POC as tolerated.     Diego Is progressing well towards his goals.   Pt prognosis is Good.     Pt will continue to benefit from skilled outpatient physical therapy to address the deficits listed in the problem list box on initial evaluation, provide pt/family education and to maximize pt's level of independence in the home and community environment.     Pt's spiritual, cultural and educational needs considered and pt agreeable to plan of care and goals.     Anticipated barriers to physical therapy: co-morbidities    Goals:   Short Term Goals:  3 weeks  1.Report decreased right shoulder pain < / =  5/10  to increase tolerance for ADL's  2. Increase PROM shoulder flexion > 130, abduction > 85   3. Increased strength to 3+/5  MMT grade in right shoulder flexion and abduction to increase tolerance for ADL and work activities.  4. Pt to tolerate HEP to improve ROM and independence with ADL's     Long Term Goals: 8 weeks  1.Report decreased right shoulder pain  < / =  2 /10  to increase tolerance for ADL's  2.Increase AROM to > 75 degrees of shoulder flexion, and > 60 degrees of shoulder abduction  3.Increase strength to >/= 4/5 in right shoulder flexion and abduction to increase tolerance for ADL and work activities.  4. 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 Pt will have improved gcode of CJ (20-40% limited) on FOTO shoulder in order to demonstrate true functional improvement.     PLAN     Plan of care Certification: 3/14/2023 to 5/09/2023.     Outpatient Physical Therapy 2 times weekly for 8 weeks to  include the following interventions: Cervical/Lumbar Traction, Electrical Stimulation , Manual Therapy, Moist Heat/ Ice, Neuromuscular Re-ed, Patient Education, Self Care, Therapeutic Activities, and Therapeutic Exercise.     Rose Bedoya, PT

## 2023-03-31 ENCOUNTER — CLINICAL SUPPORT (OUTPATIENT)
Dept: ENDOSCOPY | Facility: HOSPITAL | Age: 83
End: 2023-03-31
Attending: INTERNAL MEDICINE
Payer: MEDICARE

## 2023-03-31 VITALS — WEIGHT: 160 LBS | HEIGHT: 69 IN | BODY MASS INDEX: 23.7 KG/M2

## 2023-03-31 DIAGNOSIS — D64.9 ANEMIA, UNSPECIFIED TYPE: ICD-10-CM

## 2023-03-31 NOTE — PLAN OF CARE
Endoscopy procedure scheduled, prep instructions reviewed, Patient's wife verbalized understanding.

## 2023-04-03 ENCOUNTER — CLINICAL SUPPORT (OUTPATIENT)
Dept: REHABILITATION | Facility: HOSPITAL | Age: 83
End: 2023-04-03
Payer: MEDICARE

## 2023-04-03 DIAGNOSIS — G89.29 CHRONIC RIGHT SHOULDER PAIN: ICD-10-CM

## 2023-04-03 DIAGNOSIS — M25.611 DECREASED RANGE OF MOTION OF RIGHT SHOULDER: ICD-10-CM

## 2023-04-03 DIAGNOSIS — R29.898 DECREASED STRENGTH OF UPPER EXTREMITY: Primary | ICD-10-CM

## 2023-04-03 DIAGNOSIS — M25.511 CHRONIC RIGHT SHOULDER PAIN: ICD-10-CM

## 2023-04-03 PROCEDURE — 97112 NEUROMUSCULAR REEDUCATION: CPT | Mod: CQ

## 2023-04-03 NOTE — PROGRESS NOTES
[facility]   Physical Therapy Treatment Note     Name: Diego TIRADO [facility] Number: [id number]    Therapy Diagnosis:   Encounter Diagnoses   Name Primary?    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  He was compliant with home exercise program.  Response to previous treatment: appropriate muscle response  Functional change: none to note today    Pain: 0/10, currently  Location: Right shoulder    Patient goals: get it working, be able to lift it up overhead    OBJECTIVE     Objective Measures updated at progress report unless specified.     Treatment     Diego received the treatments listed below:      therapeutic exercises to develop strength, endurance, ROM, flexibility, posture, and core stabilization for 37 minutes including:  Aerobic Activity to improve UE strength and mobility; UBE for 8 minutes, Level 2 forward, backward for 4 minutes   Supine cane flexion - improved range to to 140 degrees of flexion 2 x 10 reps - very little pain today  Supine 2# weighted bar chest press; 2 x 10 reps  2# weighted bar shoulder flexion in sitting; 4 x 10 reps   Pulleys shoulder flexion and abduction x 4 minutes   Seated thoracic extension; 3  minutes, 3 second hold - hands on shoulder - cannot reach behind head      Not today:  Seated shoulder flexion table slides; 5 second hold, 2 x 10 reps    neuromuscular re-education activities to improve: Balance, Coordination, Kinesthetic, Sense, Proprioception, and Posture for 25 minutes. The following activities were included:  Posterior shoulder rolls; 10 reps (increased difficulty with technique) - tactile cueing improves scapular retraction  Seated no moneys RTB - tactile and verbal cues for scapular retraction; 4 x 10 reps   Standing rows with Green TB; 2 x 10     Not today:  Scapular retraction with shoulder external rotation; 3 x 10 reps (ball behind thoracic spine; heavy cueing)  Pec stretch in doorway; 2 minutes - NT  SALPD + Yellow TB; 2 x 10 reps - NT  Resisted scapular retraction no money with no resistance - therapist resistance     manual therapy techniques: Joint mobilizations, Manual traction, and Soft tissue Mobilization were applied to the: L knee for 00 minutes, including:  Glenohumeral AP mobilization to R shoulder   Right shoulder PROM and stretching into flexion and abduction   Manual pec stretch with pt seated with half foam roll mid back     Patient Education and Home Exercises     Home Exercises Provided and Patient Education Provided     Education provided:   - Compliance with his HEP    Written Home Exercises Provided: Patient instructed to cont prior HEP. Exercises were reviewed and Diego was able to demonstrate them prior to the end of the session.  Diego demonstrated good  understanding of the education provided. See EMR under Patient Instructions for exercises provided during therapy sessions    ASSESSMENT     Increased difficulty with scapular mobility during exercise, however responds well to cueing for proper muscle activation. Demonstrates improved tolerance to active shoulder flexion with weighted cane today. Continue per POC towards treatment goals.  Diego Is progressing  well towards his goals.   Pt prognosis is Good.     Pt will continue to benefit from skilled outpatient physical therapy to address the deficits listed in the problem list box on initial evaluation, provide pt/family education and to maximize pt's level of independence in the home and community environment.     Pt's spiritual, cultural and educational needs considered and pt agreeable to plan of care and goals.     Anticipated barriers to physical therapy: co-morbidities    Goals:   Short Term Goals:  3 weeks  1.Report decreased right shoulder pain < / =  5/10  to increase tolerance for ADL's  2. Increase PROM shoulder flexion > 130, abduction > 85   3. Increased strength to 3+/5  MMT grade in right shoulder flexion and abduction to increase tolerance for ADL and work activities.  4. Pt to tolerate HEP to improve ROM and independence with ADL's     Long Term Goals: 8 weeks  1.Report decreased right shoulder pain  < / =  2 /10  to increase tolerance for ADL's  2.Increase AROM to > 75 degrees of shoulder flexion, and > 60 degrees of shoulder abduction  3.Increase strength to >/= 4/5 in right shoulder flexion and abduction to increase tolerance for ADL and work activities.  4. 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 Pt will have improved gcode of CJ (20-40% limited) on FOTO shoulder in order to demonstrate true functional improvement.     PLAN     Plan of care Certification: 3/14/2023 to 5/09/2023.     Outpatient Physical Therapy 2 times weekly for 8 weeks to include the following interventions: Cervical/Lumbar Traction, Electrical Stimulation , Manual Therapy, Moist Heat/ Ice, Neuromuscular Re-ed, Patient Education, Self Care, Therapeutic Activities, and Therapeutic Exercise.     Tracey Ruiz, PTA

## 2023-04-10 ENCOUNTER — CLINICAL SUPPORT (OUTPATIENT)
Dept: REHABILITATION | Facility: HOSPITAL | Age: 83
End: 2023-04-10
Payer: MEDICARE

## 2023-04-10 DIAGNOSIS — M25.511 CHRONIC RIGHT SHOULDER PAIN: ICD-10-CM

## 2023-04-10 DIAGNOSIS — M25.611 DECREASED RANGE OF MOTION OF RIGHT SHOULDER: ICD-10-CM

## 2023-04-10 DIAGNOSIS — R29.898 DECREASED STRENGTH OF UPPER EXTREMITY: Primary | ICD-10-CM

## 2023-04-10 DIAGNOSIS — G89.29 CHRONIC RIGHT SHOULDER PAIN: ICD-10-CM

## 2023-04-10 PROCEDURE — 97112 NEUROMUSCULAR REEDUCATION: CPT | Mod: CQ

## 2023-04-10 NOTE — PROGRESS NOTES
[facility]   Physical Therapy Treatment Note     Name: Diego TIRADO [facility] Number: [id number]    Therapy Diagnosis:   Encounter Diagnoses   Name Primary?    Decreased strength of upper extremity Yes    Decreased range of motion of right shoulder     Chronic right shoulder pain      Physician: Wilmer Muñiz PA*    Visit Date: 4/10/2023    Physician Orders: PT Eval and Treat   Medical Diagnosis from Referral: Chronic right shoulder pain [M25.511, G89.29], Adhesive capsulitis of right shoulder [M75.01], Winging of scapula [M95.8]  Evaluation Date: 3/14/2023  Authorization Period Expiration: 4/11/2023  Plan of Care Expiration: 5/09/2023  Progress Note Due: 4/04/2023  Visit # / Visits authorized: 5 / 11 + eval  FOTO: 1/3    PTA Visit #: 2 / 5     Time In: 1000  Time Out: 1055  Total Treatment Time: 55 minutes  Total Billable Time: 25 minutes (2 NMR)    Precautions: Standard and Diabetes     SUBJECTIVE     Patient reports: no new complaints today. He denies pain.   He was compliant with home exercise program.  Response to previous treatment: appropriate muscle response  Functional change: none to note today    Pain: 0/10, currently  Location: Right shoulder    Patient goals: get it working, be able to lift it up overhead    OBJECTIVE     Objective Measures updated at progress report unless specified.     Treatment     Diego received the treatments listed below:      therapeutic exercises to develop strength, endurance, ROM, flexibility, posture, and core stabilization for 30 minutes including:  Aerobic Activity to improve UE strength and mobility; UBE for 10 minutes (5' fwd/5' bwd), Level 2  Supine cane flexion - improved range to to 140 degrees of flexion 2 x 10 reps - very little pain today  Supine 2# weighted bar chest press; 3 x 10 reps  2# weighted bar shoulder flexion in sitting; 4 x 10 reps - pain free range  Pulleys shoulder flexion x 4 minutes   Seated thoracic extension; 3  minutes, 3 second hold - hands on shoulder - cannot reach behind head      neuromuscular re-education activities to improve: Balance, Coordination, Kinesthetic, Sense, Proprioception, and Posture for 25 minutes. The following activities were included:  Posterior shoulder rolls; 10 reps (increased difficulty with technique) - tactile cueing improves scapular retraction  Seated no moneys RTB - tactile and verbal cues for scapular retraction; 4 x 10 reps   Standing rows with Green TB; 4 x 10 reps    Not today:  Scapular retraction with shoulder external rotation; 3 x 10 reps (ball behind thoracic spine; heavy cueing)  Pec stretch in doorway; 2 minutes - NT  SALPD + Yellow TB; 2 x 10 reps - NT  Resisted scapular retraction no money with no resistance - therapist resistance     manual therapy techniques: Joint mobilizations, Manual traction, and Soft tissue Mobilization were applied to the: R shoulder for 00 minutes, including:  Glenohumeral AP mobilization to R shoulder   Right shoulder PROM and stretching into flexion and abduction   Manual pec stretch with pt seated with half foam roll mid back     Patient Education and Home Exercises     Home Exercises Provided and Patient Education Provided     Education provided:   - Compliance with his HEP    Written Home Exercises Provided: Patient instructed to cont prior HEP. Exercises were reviewed and Diego was able to demonstrate them prior to the end of the session.  Diego demonstrated good  understanding of the education provided. See EMR under Patient Instructions for exercises provided during therapy sessions    ASSESSMENT     Patient is demonstrating improvement in shoulder mobility as seen with posterior shoulder rows. He continues to require cueing for scapular activation with periscapular strengthening exercises, however is improving as seen in previous sessions. Demonstrates improvement in active ROM at this time. Continue per POC towards treatment goals.   Diego  Is progressing well towards his goals.   Pt prognosis is Good.     Pt will continue to benefit from skilled outpatient physical therapy to address the deficits listed in the problem list box on initial evaluation, provide pt/family education and to maximize pt's level of independence in the home and community environment.     Pt's spiritual, cultural and educational needs considered and pt agreeable to plan of care and goals.     Anticipated barriers to physical therapy: co-morbidities    Goals:   Short Term Goals:  3 weeks  1.Report decreased right shoulder pain < / =  5/10  to increase tolerance for ADL's  2. Increase PROM shoulder flexion > 130, abduction > 85   3. Increased strength to 3+/5  MMT grade in right shoulder flexion and abduction to increase tolerance for ADL and work activities.  4. Pt to tolerate HEP to improve ROM and independence with ADL's     Long Term Goals: 8 weeks  1.Report decreased right shoulder pain  < / =  2 /10  to increase tolerance for ADL's  2.Increase AROM to > 75 degrees of shoulder flexion, and > 60 degrees of shoulder abduction  3.Increase strength to >/= 4/5 in right shoulder flexion and abduction to increase tolerance for ADL and work activities.  4. 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 Pt will have improved gcode of CJ (20-40% limited) on FOTO shoulder in order to demonstrate true functional improvement.     PLAN     Plan of care Certification: 3/14/2023 to 5/09/2023.     Outpatient Physical Therapy 2 times weekly for 8 weeks to include the following interventions: Cervical/Lumbar Traction, Electrical Stimulation , Manual Therapy, Moist Heat/ Ice, Neuromuscular Re-ed, Patient Education, Self Care, Therapeutic Activities, and Therapeutic Exercise.     Tracey Ruiz, PTA

## 2023-04-11 ENCOUNTER — TELEPHONE (OUTPATIENT)
Dept: INFECTIOUS DISEASES | Facility: CLINIC | Age: 83
End: 2023-04-11
Payer: MEDICARE

## 2023-04-11 NOTE — TELEPHONE ENCOUNTER
"4/11/23    Spoke w/wife    [place] country (@ resort in [facility]) from 6/8/23 - 6/13/23.  States will be unable to clean the Aerobika device daily as recommended (wont have access to boiling water ) wants to know if they can " pause the medication for the 7 days will be out of country?      Please advise       ----- Message from [employer] sent at 4/11/2023  1:54 PM CDT -----  Regarding: PtEdwar Wants Phone Call  Contact: pt.[phone]  Pt. Wife Addis is calling in ref to pt requesting a call back from someone in office. Patient Requesting Call Back @ [phone]    "
[FreeTextEntry2] : Patient returns to the office today to review her EMG results. 
[FreeTextEntry2] : Patient returns to the office today to review her EMG results.

## 2023-04-13 ENCOUNTER — CLINICAL SUPPORT (OUTPATIENT)
Dept: REHABILITATION | Facility: HOSPITAL | Age: 83
End: 2023-04-13
Payer: MEDICARE

## 2023-04-13 ENCOUNTER — TELEPHONE (OUTPATIENT)
Dept: INFECTIOUS DISEASES | Facility: CLINIC | Age: 83
End: 2023-04-13
Payer: MEDICARE

## 2023-04-13 DIAGNOSIS — M25.611 DECREASED RANGE OF MOTION OF RIGHT SHOULDER: ICD-10-CM

## 2023-04-13 DIAGNOSIS — R29.898 DECREASED STRENGTH OF UPPER EXTREMITY: Primary | ICD-10-CM

## 2023-04-13 DIAGNOSIS — M25.511 CHRONIC RIGHT SHOULDER PAIN: ICD-10-CM

## 2023-04-13 DIAGNOSIS — G89.29 CHRONIC RIGHT SHOULDER PAIN: ICD-10-CM

## 2023-04-13 PROCEDURE — 97112 NEUROMUSCULAR REEDUCATION: CPT | Mod: CQ

## 2023-04-13 NOTE — TELEPHONE ENCOUNTER
----- Message from Janette Aguilar sent at 4/13/2023  1:33 PM CDT -----  Regarding: Pt Advice  Contact: [phone]  Pts spouse is calling in ref to her  spitting up blood after using his ARIKAYCE 590 mg/8.4 mL NbSp and nebulizer and compressor (OMBRA COMPRESSOR SYSTEM) Marcy.  Spouse is upset because she's left several messages.  Please call.

## 2023-04-13 NOTE — PROGRESS NOTES
"[facility]   Physical Therapy Treatment Note     Name: Diego TIRADO [facility] Number: [id number]    Therapy Diagnosis:   Encounter Diagnoses   Name Primary?    Decreased strength of upper extremity Yes    Decreased range of motion of right shoulder     Chronic right shoulder pain      Physician: Wilmer Muñiz PA*    Visit Date: 4/13/2023    Physician Orders: PT Eval and Treat   Medical Diagnosis from Referral: Chronic right shoulder pain [M25.511, G89.29], Adhesive capsulitis of right shoulder [M75.01], Winging of scapula [M95.8]  Evaluation Date: 3/14/2023  Authorization Period Expiration: 4/11/2023  Plan of Care Expiration: 5/09/2023  Progress Note Due: 4/04/2023  Visit # / Visits authorized: 6 / 11 + eval  FOTO: 2/3     PTA Visit #: 3 / 5     Time In: 1000  Time Out: 1051  Total Treatment Time: 51 minutes  Total Billable Time: 25 minutes (2 NMR)    Precautions: Standard and Diabetes     SUBJECTIVE     Patient reports: "very little" pain in his Right shoulder.  He was compliant with home exercise program.  Response to previous treatment: appropriate muscle response  Functional change: none to note today    Pain: "very little"/10, currently  Location: Right shoulder    Patient goals: get it working, be able to lift it up overhead    OBJECTIVE     Objective Measures updated at progress report unless specified.         Treatment     Diego received the treatments listed below:      therapeutic exercises to develop strength, endurance, ROM, flexibility, posture, and core stabilization for 26 minutes including:  Aerobic Activity to improve UE strength and mobility; UBE for 10 minutes (5' fwd/5' bwd), Level 2  Supine cane flexion - improved range to to 140 degrees of flexion 2 x 10 reps - very little pain today  Supine 2# weighted bar chest press; 3 x 10 reps  2# weighted bar shoulder flexion in sitting; 4 x 10 reps - pain free range  Pulleys shoulder flexion x 4 minutes   Seated " thoracic extension; 3 minutes, 3 second hold - hands on shoulder - cannot reach behind head      neuromuscular re-education activities to improve: Balance, Coordination, Kinesthetic, Sense, Proprioception, and Posture for 25 minutes. The following activities were included:  Posterior shoulder rolls; 10 reps (increased difficulty with technique) - tactile cueing improves scapular retraction  Seated no moneys RTB - tactile and verbal cues for scapular retraction; 4 x 10 reps   Standing rows with Green TB; 4 x 10 reps    Not today:  Scapular retraction with shoulder external rotation; 3 x 10 reps (ball behind thoracic spine; heavy cueing)  Pec stretch in doorway; 2 minutes - NT  SALPD + Yellow TB; 2 x 10 reps - NT  Resisted scapular retraction no money with no resistance - therapist resistance     manual therapy techniques: Joint mobilizations, Manual traction, and Soft tissue Mobilization were applied to the: R shoulder for 00 minutes, including:  Glenohumeral AP mobilization to R shoulder   Right shoulder PROM and stretching into flexion and abduction   Manual pec stretch with pt seated with half foam roll mid back     Patient Education and Home Exercises     Home Exercises Provided and Patient Education Provided     Education provided:   - Compliance with his HEP    Written Home Exercises Provided: Patient instructed to cont prior HEP. Exercises were reviewed and Diego was able to demonstrate them prior to the end of the session.  Diego demonstrated good  understanding of the education provided. See EMR under Patient Instructions for exercises provided during therapy sessions    ASSESSMENT     Patient with good tolerance to exercises performed. Min to mod cueing for proper muscle activation and technique throughout. He reports improvement in shoulder mobility post session. There continues to be visible scapular winging on right side. Continue per POC towards treatment goals.   Diego Is progressing well towards  his goals.   Pt prognosis is Good.     Pt will continue to benefit from skilled outpatient physical therapy to address the deficits listed in the problem list box on initial evaluation, provide pt/family education and to maximize pt's level of independence in the home and community environment.     Pt's spiritual, cultural and educational needs considered and pt agreeable to plan of care and goals.     Anticipated barriers to physical therapy: co-morbidities    Goals:   Short Term Goals:  3 weeks  1.Report decreased right shoulder pain < / =  5/10  to increase tolerance for ADL's  2. Increase PROM shoulder flexion > 130, abduction > 85   3. Increased strength to 3+/5  MMT grade in right shoulder flexion and abduction to increase tolerance for ADL and work activities.  4. Pt to tolerate HEP to improve ROM and independence with ADL's     Long Term Goals: 8 weeks  1.Report decreased right shoulder pain  < / =  2 /10  to increase tolerance for ADL's  2.Increase AROM to > 75 degrees of shoulder flexion, and > 60 degrees of shoulder abduction  3.Increase strength to >/= 4/5 in right shoulder flexion and abduction to increase tolerance for ADL and work activities.  4. 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 Pt will have improved gcode of CJ (20-40% limited) on FOTO shoulder in order to demonstrate true functional improvement.     PLAN     Plan of care Certification: 3/14/2023 to 5/09/2023.     Outpatient Physical Therapy 2 times weekly for 8 weeks to include the following interventions: Cervical/Lumbar Traction, Electrical Stimulation , Manual Therapy, Moist Heat/ Ice, Neuromuscular Re-ed, Patient Education, Self Care, Therapeutic Activities, and Therapeutic Exercise.     Tracey Ruiz, PTA

## 2023-04-13 NOTE — TELEPHONE ENCOUNTER
4/13/23    Spoke w/spouse who reports yesterday pt coughed up blood tinged mucus x 3.  Denies any active bleeding in mouth / throat, no difficulty swallowing / breathing.      Was advised to hold Arikayce by Pharmacist, patient and spouse very anxious and would like to speak to you soon as possible.

## 2023-04-17 ENCOUNTER — LAB VISIT (OUTPATIENT)
Dept: LAB | Facility: HOSPITAL | Age: 83
End: 2023-04-17
Attending: INTERNAL MEDICINE
Payer: MEDICARE

## 2023-04-17 ENCOUNTER — DOCUMENTATION ONLY (OUTPATIENT)
Dept: REHABILITATION | Facility: HOSPITAL | Age: 83
End: 2023-04-17
Payer: MEDICARE

## 2023-04-17 DIAGNOSIS — A31.0 MYCOBACTERIUM AVIUM-INTRACELLULARE COMPLEX: ICD-10-CM

## 2023-04-17 PROCEDURE — 87116 MYCOBACTERIA CULTURE: CPT | Performed by: INTERNAL MEDICINE

## 2023-04-17 PROCEDURE — 87015 SPECIMEN INFECT AGNT CONCNTJ: CPT | Performed by: INTERNAL MEDICINE

## 2023-04-17 PROCEDURE — 30000890 MAYO MISCELLANEOUS TEST (REFLEX): Performed by: INTERNAL MEDICINE

## 2023-04-17 PROCEDURE — 87118 MYCOBACTERIC IDENTIFICATION: CPT

## 2023-04-17 PROCEDURE — 87186 SC STD MICRODIL/AGAR DIL: CPT | Performed by: INTERNAL MEDICINE

## 2023-04-17 PROCEDURE — 87118 MYCOBACTERIC IDENTIFICATION: CPT | Performed by: INTERNAL MEDICINE

## 2023-04-17 PROCEDURE — 87206 SMEAR FLUORESCENT/ACID STAI: CPT | Performed by: INTERNAL MEDICINE

## 2023-04-17 NOTE — PROGRESS NOTES
Physical Therapy: No show/Cancellation of Visit  Date: 04/17/2023    Patient was a no show to today's PT appointment. Patient's next scheduled appointment is 4/24/2023.     Initial Evaluation: 3/14/2023  Plan of Care Explanation: 5/9/2023  Cancel: 1  No show: 1    Therapist: Tracey Ruiz PTA

## 2023-04-20 ENCOUNTER — DOCUMENTATION ONLY (OUTPATIENT)
Dept: REHABILITATION | Facility: HOSPITAL | Age: 83
End: 2023-04-20
Payer: MEDICARE

## 2023-04-20 NOTE — PROGRESS NOTES
Physical Therapy: No show/Cancellation of Visit  Date: 04/20/2023    Patient cancelled today's PT appointment. Patient's next scheduled appointment is 4/24/2023.     Initial Evaluation: 3/14/2023  Plan of Care Explanation: 5/9/2023  Cancel: 2  No show: 1    Therapist: Tracey Ruiz PTA

## 2023-04-24 ENCOUNTER — CLINICAL SUPPORT (OUTPATIENT)
Dept: REHABILITATION | Facility: HOSPITAL | Age: 83
End: 2023-04-24
Payer: MEDICARE

## 2023-04-24 DIAGNOSIS — M25.511 CHRONIC RIGHT SHOULDER PAIN: ICD-10-CM

## 2023-04-24 DIAGNOSIS — M25.611 DECREASED RANGE OF MOTION OF RIGHT SHOULDER: ICD-10-CM

## 2023-04-24 DIAGNOSIS — R29.898 DECREASED STRENGTH OF UPPER EXTREMITY: Primary | ICD-10-CM

## 2023-04-24 DIAGNOSIS — G89.29 CHRONIC RIGHT SHOULDER PAIN: ICD-10-CM

## 2023-04-24 PROCEDURE — 97112 NEUROMUSCULAR REEDUCATION: CPT | Mod: CQ

## 2023-04-24 PROCEDURE — 97110 THERAPEUTIC EXERCISES: CPT | Mod: CQ

## 2023-04-24 NOTE — PROGRESS NOTES
"[facility]   Physical Therapy Treatment Note     Name: Diego TIRADO [facility] Number: [id number]    Therapy Diagnosis:   Encounter Diagnoses   Name Primary?    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 Per wife, patient missed last two visits secondary to prior scheduled plans.   He was compliant with home exercise program.  Response to previous treatment: appropriate muscle response  Functional change: none to note today    Pain: "very little"/10, currently  Location: Right shoulder    Patient goals: get it working, be able to lift it up overhead    OBJECTIVE     Objective Measures updated at progress report unless specified.         Treatment     Diego received the treatments listed below:      therapeutic exercises to develop strength, endurance, ROM, flexibility, posture, and core stabilization for 43 minutes including:  Aerobic Activity to improve UE strength and mobility; UBE for 10 minutes (5' fwd/5' bwd), Level 2  Supine cane flexion with 2lb AW; 3 x 10 reps - able to achieve 143 degrees of flexion ROM  Supine 2# weighted bar chest press; 3 x 10 reps  2# weighted bar " shoulder flexion in sitting; 4 x 10 reps - pain free range  Pulleys shoulder flexion x 4 minutes   Seated thoracic extension; 3 minutes, 3 second hold - hands on shoulder - cannot reach behind head      neuromuscular re-education activities to improve: Balance, Coordination, Kinesthetic, Sense, Proprioception, and Posture for 10 minutes. The following activities were included:  Posterior shoulder rolls; 10 reps (increased difficulty with technique) - tactile cueing improves scapular retraction  Seated no moneys RTB - tactile and verbal cues for scapular retraction; 4 x 10 reps   Standing rows with Green TB; 3 x 20 reps    Not today:  Scapular retraction with shoulder external rotation; 3 x 10 reps (ball behind thoracic spine; heavy cueing)  Pec stretch in doorway; 2 minutes - NT  SALPD + Yellow TB; 2 x 10 reps - NT  Resisted scapular retraction no money with no resistance - therapist resistance     manual therapy techniques: Joint mobilizations, Manual traction, and Soft tissue Mobilization were applied to the: R shoulder for 00 minutes, including:  Glenohumeral AP mobilization to R shoulder   Right shoulder PROM and stretching into flexion and abduction   Manual pec stretch with pt seated with half foam roll mid back     Patient Education and Home Exercises     Home Exercises Provided and Patient Education Provided     Education provided:   - Compliance with his HEP    Written Home Exercises Provided: Patient instructed to cont prior HEP. Exercises were reviewed and Diego was able to demonstrate them prior to the end of the session.  Diego demonstrated good  understanding of the education provided. See EMR under Patient Instructions for exercises provided during therapy sessions    ASSESSMENT     Mr. Cortez had good tolerance to exercises performed noting appropriate muscle response throughout. He continues to be challenged with posterior shoulder rolls due to poor to fair shoulder mobility, however responds well  to cueing for proper technique and shows improvement after a few reps. He requires min to mod cueing throughout session for postural awareness. Continue per POC towards treatment goals.   Diego Is progressing well towards his goals.   Pt prognosis is Good.     Pt will continue to benefit from skilled outpatient physical therapy to address the deficits listed in the problem list box on initial evaluation, provide pt/family education and to maximize pt's level of independence in the home and community environment.     Pt's spiritual, cultural and educational needs considered and pt agreeable to plan of care and goals.     Anticipated barriers to physical therapy: co-morbidities    Goals:   Short Term Goals:  3 weeks  1.Report decreased right shoulder pain < / =  5/10  to increase tolerance for ADL's  2. Increase PROM shoulder flexion > 130, abduction > 85   3. Increased strength to 3+/5  MMT grade in right shoulder flexion and abduction to increase tolerance for ADL and work activities.  4. Pt to tolerate HEP to improve ROM and independence with ADL's     Long Term Goals: 8 weeks  1.Report decreased right shoulder pain  < / =  2 /10  to increase tolerance for ADL's  2.Increase AROM to > 75 degrees of shoulder flexion, and > 60 degrees of shoulder abduction  3.Increase strength to >/= 4/5 in right shoulder flexion and abduction to increase tolerance for ADL and work activities.  4. 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 Pt will have improved gcode of CJ (20-40% limited) on FOTO shoulder in order to demonstrate true functional improvement.     PLAN     Plan of care Certification: 3/14/2023 to 5/09/2023.     Outpatient Physical Therapy 2 times weekly for 8 weeks to include the following interventions: Cervical/Lumbar Traction, Electrical Stimulation , Manual Therapy, Moist Heat/ Ice, Neuromuscular Re-ed, Patient Education, Self Care, Therapeutic Activities, and  Therapeutic Exercise.     Tracey Ruiz, PTA

## 2023-04-26 ENCOUNTER — OFFICE VISIT (OUTPATIENT)
Dept: INFECTIOUS DISEASES | Facility: CLINIC | Age: 83
End: 2023-04-26
Payer: MEDICARE

## 2023-04-26 VITALS
BODY MASS INDEX: 23.9 KG/M2 | HEART RATE: 77 BPM | HEIGHT: 69 IN | SYSTOLIC BLOOD PRESSURE: 119 MMHG | OXYGEN SATURATION: 84 % | DIASTOLIC BLOOD PRESSURE: 69 MMHG | WEIGHT: 161.38 LBS

## 2023-04-26 DIAGNOSIS — A31.0 MYCOBACTERIUM AVIUM-INTRACELLULARE COMPLEX: Primary | ICD-10-CM

## 2023-04-26 PROCEDURE — 3078F DIAST BP <80 MM HG: CPT | Mod: CPTII,S$GLB,, | Performed by: INTERNAL MEDICINE

## 2023-04-26 PROCEDURE — 99215 PR OFFICE/OUTPT VISIT, EST, LEVL V, 40-54 MIN: ICD-10-PCS | Mod: S$GLB,,, | Performed by: INTERNAL MEDICINE

## 2023-04-26 PROCEDURE — 99999 PR PBB SHADOW E&M-EST. PATIENT-LVL III: CPT | Mod: PBBFAC,,,

## 2023-04-26 PROCEDURE — 1159F MED LIST DOCD IN RCRD: CPT | Mod: CPTII,S$GLB,, | Performed by: INTERNAL MEDICINE

## 2023-04-26 PROCEDURE — 1159F PR MEDICATION LIST DOCUMENTED IN MEDICAL RECORD: ICD-10-PCS | Mod: CPTII,S$GLB,, | Performed by: INTERNAL MEDICINE

## 2023-04-26 PROCEDURE — 3074F PR MOST RECENT SYSTOLIC BLOOD PRESSURE < 130 MM HG: ICD-10-PCS | Mod: CPTII,S$GLB,, | Performed by: INTERNAL MEDICINE

## 2023-04-26 PROCEDURE — 1101F PR PT FALLS ASSESS DOC 0-1 FALLS W/OUT INJ PAST YR: ICD-10-PCS | Mod: CPTII,S$GLB,, | Performed by: INTERNAL MEDICINE

## 2023-04-26 PROCEDURE — 3288F FALL RISK ASSESSMENT DOCD: CPT | Mod: CPTII,S$GLB,, | Performed by: INTERNAL MEDICINE

## 2023-04-26 PROCEDURE — 99499 UNLISTED E&M SERVICE: CPT | Mod: S$GLB,,, | Performed by: INTERNAL MEDICINE

## 2023-04-26 PROCEDURE — 99999 PR PBB SHADOW E&M-EST. PATIENT-LVL III: ICD-10-PCS | Mod: PBBFAC,,,

## 2023-04-26 PROCEDURE — 1126F PR PAIN SEVERITY QUANTIFIED, NO PAIN PRESENT: ICD-10-PCS | Mod: CPTII,S$GLB,, | Performed by: INTERNAL MEDICINE

## 2023-04-26 PROCEDURE — 3074F SYST BP LT 130 MM HG: CPT | Mod: CPTII,S$GLB,, | Performed by: INTERNAL MEDICINE

## 2023-04-26 PROCEDURE — 99215 OFFICE O/P EST HI 40 MIN: CPT | Mod: S$GLB,,, | Performed by: INTERNAL MEDICINE

## 2023-04-26 PROCEDURE — 1157F ADVNC CARE PLAN IN RCRD: CPT | Mod: CPTII,S$GLB,, | Performed by: INTERNAL MEDICINE

## 2023-04-26 PROCEDURE — 1101F PT FALLS ASSESS-DOCD LE1/YR: CPT | Mod: CPTII,S$GLB,, | Performed by: INTERNAL MEDICINE

## 2023-04-26 PROCEDURE — 1157F PR ADVANCE CARE PLAN OR EQUIV PRESENT IN MEDICAL RECORD: ICD-10-PCS | Mod: CPTII,S$GLB,, | Performed by: INTERNAL MEDICINE

## 2023-04-26 PROCEDURE — 1126F AMNT PAIN NOTED NONE PRSNT: CPT | Mod: CPTII,S$GLB,, | Performed by: INTERNAL MEDICINE

## 2023-04-26 PROCEDURE — 3288F PR FALLS RISK ASSESSMENT DOCUMENTED: ICD-10-PCS | Mod: CPTII,S$GLB,, | Performed by: INTERNAL MEDICINE

## 2023-04-26 PROCEDURE — 99499 RISK ADDL DX/OHS AUDIT: ICD-10-PCS | Mod: S$GLB,,, | Performed by: INTERNAL MEDICINE

## 2023-04-26 PROCEDURE — 3078F PR MOST RECENT DIASTOLIC BLOOD PRESSURE < 80 MM HG: ICD-10-PCS | Mod: CPTII,S$GLB,, | Performed by: INTERNAL MEDICINE

## 2023-04-26 RX ORDER — AZITHROMYCIN 500 MG/1
500 TABLET, FILM COATED ORAL DAILY
Qty: 90 TABLET | Refills: 5 | Status: SHIPPED | OUTPATIENT
Start: 2023-04-26 | End: 2023-07-13

## 2023-04-26 RX ORDER — ETHAMBUTOL HYDROCHLORIDE 400 MG/1
800 TABLET, FILM COATED ORAL DAILY
Qty: 180 TABLET | Refills: 5 | Status: SHIPPED | OUTPATIENT
Start: 2023-04-26 | End: 2023-07-13

## 2023-04-27 ENCOUNTER — CLINICAL SUPPORT (OUTPATIENT)
Dept: REHABILITATION | Facility: HOSPITAL | Age: 83
End: 2023-04-27
Payer: MEDICARE

## 2023-04-27 DIAGNOSIS — R29.898 DECREASED STRENGTH OF UPPER EXTREMITY: Primary | ICD-10-CM

## 2023-04-27 DIAGNOSIS — M25.611 DECREASED RANGE OF MOTION OF RIGHT SHOULDER: ICD-10-CM

## 2023-04-27 DIAGNOSIS — M25.511 CHRONIC RIGHT SHOULDER PAIN: ICD-10-CM

## 2023-04-27 DIAGNOSIS — G89.29 CHRONIC RIGHT SHOULDER PAIN: ICD-10-CM

## 2023-04-27 PROCEDURE — 97112 NEUROMUSCULAR REEDUCATION: CPT

## 2023-04-27 NOTE — PROGRESS NOTES
"[facility]   Physical Therapy Treatment Note/ Reassessment     Name: Diego Gunter  Clinic Number: [id number]    Therapy Diagnosis:   Encounter Diagnoses   Name Primary?    Decreased strength of upper extremity Yes    Decreased range of motion of right shoulder     Chronic right shoulder pain      Physician: Wilmer Muñiz PA*    Visit Date: 4/27/2023    Physician Orders: PT Eval and Treat   Medical Diagnosis from Referral: Chronic right shoulder pain [M25.511, G89.29], Adhesive capsulitis of right shoulder [M75.01], Winging of scapula [M95.8]  Evaluation Date: 3/14/2023  Authorization Period Expiration: 5/12/2023  Plan of Care Expiration: 6/09/2023  Progress Note Due: 4/04/2023  Visit # / Visits authorized: 8 / 11 + eval  FOTO: 2/3    - IE; 46% Limitation   - 7th visit; 42% Limitation    PTA Visit #: 4 / 5     Time In: 10:03 AM  Time Out: 10:58 AM  Total Treatment Time: 55 minutes  Total Billable Time: 28 minutes     Precautions: Standard and Diabetes     SUBJECTIVE     Patient reports: Diego reports that he is doing much better since coming to physical therapy as he is able to move his shoulder around as compared to before. He reports that he still wants to get better with this and working on using the right arm with closing/opening a door and reaching overhead.   He was compliant with home exercise program.  Response to previous treatment: appropriate muscle response  Functional change: Able to reach higher    Pain: "very little"/10, currently  Location: Right shoulder    Patient goals: get it working, be able to lift it up overhead    OBJECTIVE     Objective Measures updated at progress report unless specified.         Observation: forward head posture, rounded shoulders, winging of right scapula     Posture: moderate kyphosis in sitting and standing     Cervical Range of Motion:     Degrees Pain   Flexion Limited 20% (IE); 51 deg (RE)  No - stretch      Extension Limited - " achieves about 10 degrees; 32 deg (RE) Yes in back of neck      Right Rotation 45 degrees; same (RE) No - stretch      Left Rotation 45 degrees; same (RE) No - stretch      Right Side Bending 15 degrees; 29 deg (RE) Stretching on opposite side   Left Side Bending 10 degrees; 21 deg (RE) Stretching on opposite side       Passive Range of Motion:   Shoulder Right Left   Flexion 130 - capsular endfeel; 134 deg (RE) WNL   Abduction 85 - empty end feel; 89 deg (RE) WNL   ER at 45 30; 34 deg (RE) NT   ER at 90 Cannot reach position; Same (RE) NT   IR 55; 60 (RE) NT      Active Range of Motion:   Shoulder Right Left   Flexion 75; 84 deg (RE) 148   Abduction 60; 85 deg (RE) 150   Reach behind back L4; L1-2 (RE) T7   Reach behind head  Reaches right ear; same (RE) C6      Strength: No change in strength (RE)  Shoulder Right Left   Flexion Cannot reach test position  4-/5   Abduction Cannot reach test position  4/5   ER 4/5 4+/5   IR 3+/5 4/5      Joint Mobility: glenohumeral hypomobility with AP mobilization and inferior mobilization      Palpation: TTP right upper trap, right medial border of scapula     Flexibility: stretching in anterior shoulder felt with IR, decreased cervical flexibility              Pec Minor: limited bilaterally     Limitation/Restriction for FOTO Shoulder Survey     Therapist reviewed FOTO scores for Diego CANDIDA Leekelly on 3/14/2023.   FOTO documents entered into [employer] - see Media section.     Limitation Score: 46% (IE); 42% (RE)           Treatment     Diego received the treatments listed below:      therapeutic exercises to develop strength, endurance, ROM, flexibility, posture, and core stabilization for 19 minutes including:  Aerobic Activity to improve UE strength and mobility; UBE for 10 minutes (5' fwd/5' bwd), Level 2  Supine cane flexion with 2lb AW; 3 x 10 reps   Pulleys shoulder flexion/abduction x 4 minutes     Not Today   Supine 2# weighted bar chest press; 3 x 10 reps  2# weighted bar  shoulder flexion in sitting; 4 x 10 reps - pain free range  Seated thoracic extension; 3 minutes, 3 second hold - hands on shoulder - cannot reach behind head      neuromuscular re-education activities to improve: Balance, Coordination, Kinesthetic, Sense, Proprioception, and Posture for 28 minutes, including:  **Reassessment Performed**  Posterior shoulder rolls; 10 reps (increased difficulty with technique) - tactile cueing improves scapular retraction  Seated no moneys RTB - tactile and verbal cues for scapular retraction; 4 x 10 reps   Standing rows with Green TB; 3 x 20 reps  Scapular retraction with shoulder external rotation; 3 x 10 reps (ball behind thoracic spine; heavy cueing)    manual therapy techniques: Joint mobilizations, Manual traction, and Soft tissue Mobilization were applied to the: R shoulder for 8 minutes, including:  Glenohumeral AP mobilization to R shoulder   Right shoulder PROM and stretching into flexion and abduction   Manual pec stretch with pt seated with half foam roll mid back     Patient Education and Home Exercises     Home Exercises Provided and Patient Education Provided     Education provided:   - Compliance with his HEP    Written Home Exercises Provided: Patient instructed to cont prior HEP. Exercises were reviewed and Diego was able to demonstrate them prior to the end of the session.  Diego demonstrated good  understanding of the education provided. See EMR under Patient Instructions for exercises provided during therapy sessions    ASSESSMENT     Diego has been seen in Physical Therapy for 8 visits for cervical and shoulder pain and is making steady progress with Physical Therapy interventions. He has improved range of motion and strength since his evaluation. He remains limited in range of motion and strength as compared to contralateral side and normative values. He continues to be functionally limited in overhead reaching, lifting, and self care tasks with pain that  can reach 4/10. Interventions will continue to focus on active assisted range of motion into higher ranges and scapular activation for improvement in overhead activities. He is making steady progress towards his functional goals and also improved his Functional outcome score, therefore would benefit from continued skilled therapy to address functional limitations consisting of therapeutic exercise and activities, neuromuscular endurance training and manual joint mobilizations, manipulations, and dry needling to further improve muscle endurance, muscle hypertrophy, strength, and power to return to functional activities with no limitations.     Diego Is progressing well towards his goals.   Pt prognosis is Good.     Pt will continue to benefit from skilled outpatient physical therapy to address the deficits listed in the problem list box on initial evaluation, provide pt/family education and to maximize pt's level of independence in the home and community environment.     Pt's spiritual, cultural and educational needs considered and pt agreeable to plan of care and goals.     Anticipated barriers to physical therapy: co-morbidities    Goals:   Short Term Goals: 3 weeks (Progressing, Not Met)  1.Report decreased right shoulder pain < / =  5/10  to increase tolerance for ADL's  2. Increase PROM shoulder flexion > 130, abduction > 85   3. Increased strength to 3+/5  MMT grade in right shoulder flexion and abduction to increase tolerance for ADL and work activities.  4. Pt to tolerate HEP to improve ROM and independence with ADL's     Long Term Goals: 8 weeks (Progressing, Not Met)  1.Report decreased right shoulder pain  < / =  2 /10  to increase tolerance for ADL's  2.Increase AROM to > 75 degrees of shoulder flexion, and > 60 degrees of shoulder abduction  3.Increase strength to >/= 4/5 in right shoulder flexion and abduction to increase tolerance for ADL and work activities.  4. Pt goal: to be able to lift 10 #  overhead, will be able to wash his hair without pain  5. Pt will have improved gcode of CJ (20-40% limited) on FOTO shoulder in order to demonstrate true functional improvement.     PLAN     Plan of care Certification: 3/14/2023 to 6/09/2023.     Outpatient Physical Therapy 2 times weekly for 8 weeks to include the following interventions: Cervical/Lumbar Traction, Electrical Stimulation , Manual Therapy, Moist Heat/ Ice, Neuromuscular Re-ed, Patient Education, Self Care, Therapeutic Activities, and Therapeutic Exercise.     Vielka Duckworth, PT

## 2023-04-29 NOTE — PROGRESS NOTES
INFECTIOUS DISEASE CLINIC  04/29/2023 10:23 AM    Subjective:      Chief Complaint:   Pulmonary MAC follow up     History of Present Illness:    Patient Diego Gunter is a 82 y.o. male  with h/o emphysema and cavitary MAC, recent epidural hematoma, seen in clinic for MAC f/u. Recently started Arikayce. After starting had some blood-tinged sputum. Denies hemoptysis. Paused the medication for a few days. Denies further blood streaks since resuming. Going to [facility] only for son's wedding soon. Denies other complaints. Compliant with azithro/ethambutol daily. Denies side effects. Didn't tolerate rifampin. Had discussed starting clofazamine. Reluctant with side effects. Took consent form home to review, lost. Compliant with aerobika and inhaled hypertonic saline, which he has been on > 1 year  More energy    Sputum Cultures persistently positive. Susceptibilities from 3/13/23 culture pending.         Organism     MYCOBACTERIUM AVIUM   Antibiotic                   TED (mcg/mL)  Interpretation   ----------------------------------------------------------   Clofazimine                          0.12   Moxifloxacin                            4       R   Clarithromycin                          2       S   Amikacin (IV)                           8       S   Amikacin (liposomal, inhaled)           8       S   Linezolid                              32       R        Takes prilosec for reflux.     Quit smoking 57 y ago.     Had bronch 10/2018 for work up of lung mass     BRONCHIAL WASH CYTOLOGY:  NEGATIVE EXAM-NO NEOPLASIA IDENTIFIED  NO ORGANISMS IDENTIFIED WITH A GMS STAIN  THE CONTROL STAINED APPROPRIATEL          AFB cultures 10/7/21, 10/28/21, 11/18/21, 12/9/21 all positive MAC     CT 11/2021  1. Continued evolution of previously described right upper lobe cavitary lesion measuring approximately 3.5 x 4.3 x 5.7-cm (previously 3.3 x 4.4 x 5.1-cm). Similar to minimal interval decrease in the amount of  surrounding wall thickness, consolidation and surrounding scattered small consolidative multifocal airspace opacities.  Mediastinal lymphadenopathy, not significantly changed.  2. Symmetric moderate-severe centrilobular emphysematous changes with basoapical gradient, not significantly changed.      Review of Symptoms:  Constitutional: Denies fevers, chills, or weakness.  ENT: Denies dysphagia, nasal discharge, ear pain or discharge.  Cardiovascular: Denies chest pain, palpitations, orthopnea, or claudication.  Respiratory: Denies shortness of breath, cough, hemoptysis, or wheezing.  GI: Denies nausea/vomitting, hematochezia, melena, abd pain, or changes in appetite.  : Denies dysuria, incontinence, or hematuria.  Musculoskeletal: Denies joint pain or myalgias.  Skin/breast: Denies rashes, lumps, lesions, or discharge.  Neurologic: Denies headache, dizziness, vertigo, or paresthesias.    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 Dr. Pham    Hx of colonic polyps     followed by GI. Dr. Pham    Hyperlipidemia associated with type 2 diabetes mellitus     Hyperlipidemia LDL goal <100     Hypernatremia 05/05/2022    Hypertension associated with type 2 diabetes mellitus     Hypertension associated with type 2 diabetes mellitus     Hypotension     Intermittent confusion 04/29/2022    Overweight(278.02)     Prostate cancer 09/2011    followed by urology, Dr. Rogers    Pulmonary embolism     Reducible right inguinal hernia 10/10/2022    Right hemiparesis 07/22/2022    Secondary to  cervical hematoma    Type II or unspecified type diabetes mellitus without mention of complication, not stated as uncontrolled     diet controlled    Urinary retention 04/29/2022       Past Surgical History:   Procedure Laterality Date    BRONCHOSCOPY N/A 10/15/2018    Procedure: BRONCHOSCOPY;  [facility];  Location: [facility] OR [address] [place] [address];  Service: Anesthesiology;  Laterality: N/A;    CARPAL TUNNEL RELEASE Right 1/11/2023    Procedure: RELEASE, CARPAL TUNNEL;  Surgeon: Romero Lazo MD;  Location: [facility] OR;  Service: Orthopedics;  Laterality: Right;    CATARACT EXTRACTION, BILATERAL  2014    DECOMPRESSION, NERVE, ULNAR Right 1/11/2023    Procedure: DECOMPRESSION, NERVE, ULNAR - right cub jolie and guyon's decompression as well as open right carpal tunnel release;  Surgeon: Romero Lazo MD;  Location: [facility] OR;  Service: Orthopedics;  Laterality: Right;    POSTERIOR CERVICAL LAMINECTOMY Bilateral 4/28/2022    Procedure: LAMINECTOMY, SPINE, CERVICAL, POSTERIOR APPROACH C3-6;  Surgeon: Carlos Miller MD;  Location: [facility] OR [address] [place] [address];  Service: Neurosurgery;  Laterality: Bilateral;    PROSTATECTOMY      REPAIR, HERNIA, INGUINAL, WITHOUT HISTORY OF PRIOR REPAIR, AGE 5 YEARS OR OLDER Right 10/10/2022    Procedure: REPAIR, HERNIA, INGUINAL, WITHOUT HISTORY OF PRIOR REPAIR, AGE 5 YEARS OR OLDER;  Surgeon: Dario Esquivel MD;  Location: [facility] OR [address] [place] [address];  Service: General;  Laterality: Right;  open right inguinal hernia repair with mesh       Family History   Problem Relation Age of Onset    Colon cancer Mother     Diabetes Mother     Heart disease Father     Mental illness Sister     Diabetes Sister     Other Brother         polio as a child    Pulmonary fibrosis Brother     Diabetes Brother     Myasthenia gravis Brother     Parkinsonism Brother     Diabetes Brother     Dementia Brother     Lung cancer Brother         smoker    Diabetes Maternal Aunt     Diabetes Other     Esophageal cancer Neg Hx         Social History     Socioeconomic History    Marital status:      Spouse name: Addis    Number of children: 2   Occupational History    Occupation: tool    Tobacco Use    Smoking status: Former     Packs/day: 0.25     Years: 5.00     Pack years: 1.25     Types: Cigarettes     Quit date: 10/2/1962     Years since quittin.6    Smokeless tobacco: Former    Tobacco comments:     quit age 23   Substance and Sexual Activity    Alcohol use: Not Currently    Drug use: No    Sexual activity: Yes     Partners: Female     Social Determinants of Health     Financial Resource Strain: Low Risk     Difficulty of Paying Living Expenses: Not hard at all   Food Insecurity: No Food Insecurity    Worried About Running Out of Food in the Last Year: Never true    Ran Out of Food in the Last Year: Never true   Transportation Needs: No Transportation Needs    Lack of Transportation (Medical): No    Lack of Transportation (Non-Medical): No   Physical Activity: Inactive    Days of Exercise per Week: 0 days    Minutes of Exercise per Session: 10 min   Stress: No Stress Concern Present    Feeling of Stress : Only a little   Social Connections: Moderately Integrated    Frequency of Communication with Friends and Family: Twice a week    Frequency of Social Gatherings with Friends and Family: Once a week    Attends Jehovah[employer] witness Services: More than 4 times per year    Active Member of Clubs or Organizations: No    Attends Club or Organization Meetings: Never    Marital Status:    Housing Stability: Low Risk     Unable to Pay for Housing in the Last Year: No    Number of Places Lived in the Last Year: 1    Unstable Housing in the Last Year: No       Review of patient's allergies indicates:  No Known Allergies      Objective:   VS (24h):   Vitals:    23 0827   BP: 119/69   Pulse: 77     [unfilled]  General: Afebrile, alert, comfortable, no acute distress.   HEENT: TERELL. EOMI, no scleral icterus.    Pulmonary: Non labored,clear to auscultation A/P/L. No wheezing, crackles, or rhonchi.  Cardiac: normal S1 & S2 w/o rubs/murmurs/gallops.   Abdominal: Non-tender, non-distended.  Extremities: Moves all extremities x 4. No peripheral edema.  Skin: No jaundice, rashes, or visible lesions.   Neurological:  Alert and oriented x 4.     Labs:    Glucose   Date Value Ref Range Status   03/08/2023 85 70 - 110 mg/dL Final   10/21/2022 106 70 - 110 mg/dL Final   09/19/2022 106 70 - 110 mg/dL Final       Calcium   Date Value Ref Range Status   03/08/2023 9.9 8.7 - 10.5 mg/dL Final   10/21/2022 9.5 8.7 - 10.5 mg/dL Final   09/19/2022 9.9 8.7 - 10.5 mg/dL Final       Albumin   Date Value Ref Range Status   03/08/2023 4.1 3.5 - 5.2 g/dL Final   10/21/2022 4.0 3.5 - 5.2 g/dL Final   09/19/2022 4.4 3.5 - 5.2 g/dL Final       Total Protein   Date Value Ref Range Status   03/08/2023 7.4 6.0 - 8.4 g/dL Final   10/21/2022 7.4 6.0 - 8.4 g/dL Final   09/19/2022 7.6 6.0 - 8.4 g/dL Final       Sodium   Date Value Ref Range Status   03/08/2023 140 136 - 145 mmol/L Final   10/21/2022 142 136 - 145 mmol/L Final   09/19/2022 138 136 - 145 mmol/L Final       Potassium   Date Value Ref Range Status   03/08/2023 4.4 3.5 - 5.1 mmol/L Final   10/21/2022 3.9 3.5 - 5.1 mmol/L Final   09/19/2022 4.1 3.5 - 5.1 mmol/L Final       CO2   Date Value Ref Range Status   03/08/2023 30 (H) 23 - 29 mmol/L Final   10/21/2022 24 23 - 29 mmol/L Final   09/19/2022 26 23 - 29 mmol/L Final       Chloride   Date Value Ref Range Status   03/08/2023 104 95 - 110 mmol/L Final   10/21/2022 106 95 - 110 mmol/L Final   09/19/2022 102 95 - 110 mmol/L Final       BUN   Date Value Ref Range Status   03/08/2023 18 8 - 23 mg/dL Final   10/21/2022 11 8 - 23 mg/dL Final   09/19/2022 14 8 - 23 mg/dL Final       Creatinine   Date Value Ref Range Status   03/08/2023 1.0 0.5 - 1.4 mg/dL Final   10/21/2022 1.1 0.5 - 1.4 mg/dL Final   09/19/2022 0.9 0.5 - 1.4 mg/dL Final        Alkaline Phosphatase   Date Value Ref Range Status   03/08/2023 68 55 - 135 U/L Final   10/21/2022 65 55 - 135 U/L Final   09/19/2022 74 55 - 135 U/L Final       ALT   Date Value Ref Range Status   03/08/2023 18 10 - 44 U/L Final   10/21/2022 14 10 - 44 U/L Final   09/19/2022 23 10 - 44 U/L Final       AST   Date Value Ref Range Status   03/08/2023 18 10 - 40 U/L Final   10/21/2022 21 10 - 40 U/L Final   09/19/2022 22 10 - 40 U/L Final       Total Bilirubin   Date Value Ref Range Status   03/08/2023 0.5 0.1 - 1.0 mg/dL Final     Comment:     For infants and newborns, interpretation of results should be based  on gestational age, weight and in agreement with clinical  observations.    Premature Infant recommended reference ranges:  Up to 24 hours.............<8.0 mg/dL  Up to 48 hours............<12.0 mg/dL  3-5 days..................<15.0 mg/dL  6-29 days.................<15.0 mg/dL     10/21/2022 0.8 0.1 - 1.0 mg/dL Final     Comment:     For infants and newborns, interpretation of results should be based  on gestational age, weight and in agreement with clinical  observations.    Premature Infant recommended reference ranges:  Up to 24 hours.............<8.0 mg/dL  Up to 48 hours............<12.0 mg/dL  3-5 days..................<15.0 mg/dL  6-29 days.................<15.0 mg/dL     09/19/2022 0.4 0.1 - 1.0 mg/dL Final     Comment:     For infants and newborns, interpretation of results should be based  on gestational age, weight and in agreement with clinical  observations.    Premature Infant recommended reference ranges:  Up to 24 hours.............<8.0 mg/dL  Up to 48 hours............<12.0 mg/dL  3-5 days..................<15.0 mg/dL  6-29 days.................<15.0 mg/dL         WBC   Date Value Ref Range Status   11/04/2022 6.00 3.90 - 12.70 K/uL Final   10/21/2022 6.65 3.90 - 12.70 K/uL Final   09/19/2022 10.41 3.90 - 12.70 K/uL Final       Hemoglobin   Date Value Ref Range Status   11/04/2022 10.1  (L) 14.0 - 18.0 g/dL Final   10/21/2022 9.7 (L) 14.0 - 18.0 g/dL Final   09/19/2022 13.3 (L) 14.0 - 18.0 g/dL Final       POC Hematocrit   Date Value Ref Range Status   04/28/2022 34 (L) 36 - 54 %PCV Final   04/28/2022 42 36 - 54 %PCV Final     Hematocrit   Date Value Ref Range Status   11/04/2022 31.2 (L) 40.0 - 54.0 % Final   10/21/2022 30.0 (L) 40.0 - 54.0 % Final   09/19/2022 40.3 40.0 - 54.0 % Final       MCV   Date Value Ref Range Status   11/04/2022 86 82 - 98 fL Final   10/21/2022 84 82 - 98 fL Final   09/19/2022 81 (L) 82 - 98 fL Final       Platelets   Date Value Ref Range Status   11/04/2022 340 150 - 450 K/uL Final   10/21/2022 396 150 - 450 K/uL Final   09/19/2022 275 150 - 450 K/uL Final       Lab Results   Component Value Date    CHOL 135 03/08/2023    CHOL 116 (L) 04/28/2022    CHOL 116 (L) 03/07/2022       Lab Results   Component Value Date    HDL 40 03/08/2023    HDL 36 (L) 04/28/2022    HDL 43 03/07/2022       Lab Results   Component Value Date    LDLCALC 70.8 03/08/2023    LDLCALC 62.4 (L) 04/28/2022    LDLCALC 54.4 (L) 03/07/2022       Lab Results   Component Value Date    TRIG 121 03/08/2023    TRIG 88 04/28/2022    TRIG 93 03/07/2022       Lab Results   Component Value Date    CHOLHDL 29.6 03/08/2023    CHOLHDL 31.0 04/28/2022    CHOLHDL 37.1 03/07/2022       RPR   Date Value Ref Range Status   05/08/2022 Non-reactive Non-reactive Final   10/21/2019 Non-reactive Non-reactive Final     No results found for: QUANTIFERON    Medications:  Current Outpatient Medications on File Prior to Visit   Medication Sig Dispense Refill    aspirin (ECOTRIN) 81 MG EC tablet Take 81 mg by mouth once daily.      azelastine (ASTELIN) 137 mcg (0.1 %) nasal spray 2 sprays (274 mcg total) by Nasal route 2 (two) times daily. 30 mL 0    b complex vitamins capsule Take 1 capsule by mouth once daily.      nebulizer and compressor (WiiiWaaa COMPRESSOR SYSTEM) Marcy use to administer nebulized medication as directed 1 each 0     pravastatin (PRAVACHOL) 10 MG tablet Take 1 tablet (10 mg total) by mouth once daily. 90 tablet 1    sodium chloride 3% 3 % nebulizer solution USE 4 ML VIAL IN NEBULIZER  TWICE DAILY 480 mL 11    sodium chloride 7% 7 % nebulizer solution use one vial in nebulizer twice a day      albuterol (VENTOLIN HFA) 90 mcg/actuation inhaler Inhale 2 puffs into the lungs daily as needed (30 min before arikayce). Rescue 18 g 11    ARIKAYCE 590 mg/8.4 mL NbSp       [DISCONTINUED] omega-3 fatty acids 1,000 mg Cap Take 2 g by mouth.       No current facility-administered medications on file prior to visit.       Antibiotics:   Antibiotics (From admission, onward)      None            HIV: No components found for: HIV 1/2 AG/AB  Hepatitis C IgG: No components found for: HEPATITIS C  Syphilis:   RPR   Date Value Ref Range Status   05/08/2022 Non-reactive Non-reactive Final   10/21/2019 Non-reactive Non-reactive Final       Hepatitis A IgG: No components found for: HEPATITIS A IGG  Hepatitis Bc IgG: No components found for: HEPATITIS B CORE IGG  Hepatitis Bs IgG:  Quantiferon: No results found for: QUANTIFERON  VZV IgG: No components found for: VARICELLA IGG    No components found for: SEDIMENTATION RATE  No components found for: C-REACTIVE PROTEIN      Microbiology x 7d:   Microbiology Results (last 7 days)       ** No results found for the last 168 hours. **            Immunization History   Administered Date(s) Administered    COVID-19, MRNA, LN-S, PF (Pfizer) (Purple Cap) 01/13/2021, 04/28/2021, 11/05/2021    Influenza 10/19/2022    Influenza (FLUAD) - Trivalent - Adjuvanted - PF (65+) 09/25/2019    Influenza - High Dose - PF (65 years and older) 09/18/2015, 10/04/2016, 10/23/2017, 09/21/2018    Influenza - Quadrivalent - High Dose - PF (65 years and older) 11/05/2021, 10/18/2022    Influenza - Quadrivalent - PF *Preferred* (6 months and older) 10/06/2014    Influenza A (H1N1) 2009 Monovalent - IM 01/22/2010    Influenza Split  10/10/2011, 10/06/2014    Pneumococcal Conjugate - 13 Valent 10/28/2015    Pneumococcal Polysaccharide - 23 Valent 07/12/2013    Tdap 03/08/2015    Zoster Recombinant 09/25/2019, 12/30/2019         Reviewed records today as well as relevant labs, cultures, and imaging    Assessment:     Pulmonary MAC, macrolide susceptible  Cavitary lung lesion  Nodular liver- noted on CT; US abdomen 10/2021 No compelling sonographic evidence of cirrhosis.  empysema  Antiphospholipid antibody syndrome; h/o coumadin, paused after hematoma     Meets ATS/IDSA guidelines for treatment- cough, cavitary lung lesion, MAC from bronch sample. AFB cultures 10/7/21, 10/28/21, 11/18/21, 12/9/21 all positive MAC. CT 11/2021 with RUL cavitary lesion-3.5 x 4.3 x 5.7-cm     Risk factor-   structural: emphysema, bronchiectasis  functional: reflux        Qtc 407 on 3/8/23    No toxicities from antimicrobials  Extremely medically complex  Patient is high risk for infectious complications given medical comorbidities    Plan:     Continue antibiotics- daily ethambutol and azithromycin (started 12/2021). Continue inhaled amikacin. Counseled extensively on side effects. Given baseline hearing issues,estab with audiology for monitoring while on arikayce.     Last CT chest 3/8/23- stable/some improvement.  low threshold for also adding clofazamine (as substitute for rifampin) with any clinical worsening Counseled on side effects and gave him a copy of the [facility] clofazamine and HIPPa consent to review.     Counseled extensively on antibiotic side effects. While on ethambutol- self vision screens daily. If blurry vision lasts 2 days in a row, patient known to stop ethambutol and call eye doctor for exam and to let us know that this happened.     Discussed importance of airway clearance. continue Aerobika and nebulized hypertonic saline bid. Recommended decreasing duration of hot showers, avoid soil and water aerosols, use fans in shower area, avoid fine mist  shower heads (heavy droplets better), avoid bubbling water (hot tubs, humidifiers), flush water heater frequently to avoid biofilm buildup     Prevent reflux- avoid stomach sleeping. Famotidine/tums. Stop liquids 3hr before bedtime     Monitor afb sputum    - Will monitor drug therapy for toxicity      - The following labs were ordered:    Orders Placed This Encounter   Procedures    AFB Culture & Smear     Standing Status:   Standing     Number of Occurrences:   11     Standing Expiration Date:   6/24/2024           I have sent communication to the referring physician and/or primary care provider.     I spent a total of 40 minutes on the day of the visit. This includes face to face time and non-face to face time preparing to see the patient (eg, review of tests), obtaining and/or reviewing separately obtained history, documenting clinical information in the electronic or other health record, independently interpreting results, and communicating results to the patient/family/caregiver, or care coordination.      Paola Rao MD, MPH  Infectious Disease

## 2023-05-01 ENCOUNTER — CLINICAL SUPPORT (OUTPATIENT)
Dept: REHABILITATION | Facility: HOSPITAL | Age: 83
End: 2023-05-01
Payer: MEDICARE

## 2023-05-01 DIAGNOSIS — G89.29 CHRONIC RIGHT SHOULDER PAIN: ICD-10-CM

## 2023-05-01 DIAGNOSIS — M25.511 CHRONIC RIGHT SHOULDER PAIN: ICD-10-CM

## 2023-05-01 DIAGNOSIS — M25.611 DECREASED RANGE OF MOTION OF RIGHT SHOULDER: ICD-10-CM

## 2023-05-01 DIAGNOSIS — R29.898 DECREASED STRENGTH OF UPPER EXTREMITY: Primary | ICD-10-CM

## 2023-05-01 PROCEDURE — 97112 NEUROMUSCULAR REEDUCATION: CPT | Mod: CQ

## 2023-05-01 NOTE — PROGRESS NOTES
[facility]   Physical Therapy Treatment Note    Name: Diego TIRADO [facility] Number: [id number]    Therapy Diagnosis:   Encounter Diagnoses   Name Primary?    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  He was compliant with home exercise program.  Response to previous treatment: appropriate muscle response  Functional change: Able to reach higher    Pain: 0/10, currently  Location: Right shoulder    Patient goals: get it working, be able to lift it up overhead    OBJECTIVE     Objective Measures updated at progress report unless specified.     Treatment     Diego received the treatments listed below:      neuromuscular re-education activities to improve: Balance, Coordination, Kinesthetic, Sense, Proprioception, and Posture for 60 minutes, including:  Patient education:   - reviewed updated HEP (see patient instructions on 4/27)   - postural awareness    Aerobic Activity to improve UE strength and mobility; UBE for 10 minutes (5' fwd/5' bwd), Level 3  Pulleys shoulder flexion/abduction x 5 minutes - d/c next visit to HEP  Posterior shoulder rolls; 2 x 10 reps (increased difficulty with  technique) - tactile cueing improves scapular retraction  Seated no moneys RTB - tactile and verbal cues for scapular retraction; 4 x 10 reps   Standing rows with Green TB; 3 x 20 reps  Seated shoulder flexion with 3lb dowel; 4 x 10 reps - pain free range  +Modified serratus slides with thoracic extension focus; 3 x 10 reps seated in chair with 1/2 foam blue bolster  +Hitchhikers at 1/2 wall; 2 x 10 reps  +OH shoulder lift to shelf with 1lb DB; 10 reps     manual therapy techniques: Joint mobilizations, Manual traction, and Soft tissue Mobilization were applied to the: R shoulder for 00 minutes, including:  Glenohumeral AP mobilization to R shoulder   Right shoulder PROM and stretching into flexion and abduction   Manual pec stretch with pt seated with half foam roll mid back     Patient Education and Home Exercises     Home Exercises Provided and Patient Education Provided     Education provided:   - See above    Written Home Exercises Provided: Patient instructed to cont prior HEP. Exercises were reviewed and Diego was able to demonstrate them prior to the end of the session.  Diego demonstrated good  understanding of the education provided. See EMR under Patient Instructions for exercises provided during therapy sessions    ASSESSMENT     Mr. Cortez has good tolerance to exercises performed. Added functional lifting today to improve tolerance for ADL's and return to PLOF. Continue per POC towards discharge.   Diego Is progressing well towards his goals.   Pt prognosis is Good.     Pt will continue to benefit from skilled outpatient physical therapy to address the deficits listed in the problem list box on initial evaluation, provide pt/family education and to maximize pt's level of independence in the home and community environment.     Pt's spiritual, cultural and educational needs considered and pt agreeable to plan of care and goals.     Anticipated barriers to physical therapy: co-morbidities    Goals:    Short Term Goals: 3 weeks (Progressing, Not Met)  1.Report decreased right shoulder pain < / =  5/10  to increase tolerance for ADL's  2. Increase PROM shoulder flexion > 130, abduction > 85   3. Increased strength to 3+/5  MMT grade in right shoulder flexion and abduction to increase tolerance for ADL and work activities.  4. Pt to tolerate HEP to improve ROM and independence with ADL's     Long Term Goals: 8 weeks (Progressing, Not Met)  1.Report decreased right shoulder pain  < / =  2 /10  to increase tolerance for ADL's  2.Increase AROM to > 75 degrees of shoulder flexion, and > 60 degrees of shoulder abduction  3.Increase strength to >/= 4/5 in right shoulder flexion and abduction to increase tolerance for ADL and work activities.  4. 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 Pt will have improved gcode of CJ (20-40% limited) on FOTO shoulder in order to demonstrate true functional improvement.     PLAN     Plan of care Certification: 3/14/2023 to 6/09/2023.     Outpatient Physical Therapy 2 times weekly for 8 weeks to include the following interventions: Cervical/Lumbar Traction, Electrical Stimulation , Manual Therapy, Moist Heat/ Ice, Neuromuscular Re-ed, Patient Education, Self Care, Therapeutic Activities, and Therapeutic Exercise.     Tracey Ruiz, PTA

## 2023-05-04 ENCOUNTER — CLINICAL SUPPORT (OUTPATIENT)
Dept: REHABILITATION | Facility: HOSPITAL | Age: 83
End: 2023-05-04
Payer: MEDICARE

## 2023-05-04 DIAGNOSIS — R29.898 DECREASED STRENGTH OF UPPER EXTREMITY: Primary | ICD-10-CM

## 2023-05-04 DIAGNOSIS — M25.611 DECREASED RANGE OF MOTION OF RIGHT SHOULDER: ICD-10-CM

## 2023-05-04 DIAGNOSIS — G89.29 CHRONIC RIGHT SHOULDER PAIN: ICD-10-CM

## 2023-05-04 DIAGNOSIS — M25.511 CHRONIC RIGHT SHOULDER PAIN: ICD-10-CM

## 2023-05-04 PROCEDURE — 97112 NEUROMUSCULAR REEDUCATION: CPT | Mod: CQ

## 2023-05-04 NOTE — PROGRESS NOTES
[facility]   Physical Therapy Treatment Note    Name: Diego TIRADO [facility] Number: [id number]    Therapy Diagnosis:   Encounter Diagnoses   Name Primary?    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 Has been practicing at home with lifting a can to a shelf.  He was compliant with home exercise program.  Response to previous treatment: appropriate muscle response  Functional change: Able to reach higher    Pain: 0/10, currently  Location: Right shoulder    Patient goals: get it working, be able to lift it up overhead    OBJECTIVE     Objective Measures updated at progress report unless specified.     Treatment     Diego received the treatments listed below:      neuromuscular re-education activities to improve: Balance, Coordination, Kinesthetic, Sense, Proprioception, and Posture for 55 minutes, including:  Patient education:   - reviewed updated HEP (see patient instructions on 4/27)   - postural awareness    Aerobic Activity to improve UE strength and mobility; UBE for 10 minutes (5' fwd/5' bwd), Level 3  Seated thoracic extension over 1/2 foam; 5-10 second hold, 3  minutes  Posterior shoulder rolls; 2 x 10 reps (increased difficulty with technique) - tactile cueing improves scapular retraction  Seated no moneys RTB - tactile and verbal cues for scapular retraction; 4 x 10 reps   Standing rows with Green TB; 3 x 20 reps  Seated shoulder flexion with 3lb dowel; 4 x 10 reps - pain free range  Modified serratus slides with thoracic extension focus; 3 x 10 reps seated in chair with 1/2 foam blue bolster  Hitchhikers at 1/2 wall; 2 x 10 reps  OH shoulder lift to shelf (30 inches) with 1lb DB; 10 reps     manual therapy techniques: Joint mobilizations, Manual traction, and Soft tissue Mobilization were applied to the: R shoulder for 00 minutes, including:  Glenohumeral AP mobilization to R shoulder   Right shoulder PROM and stretching into flexion and abduction   Manual pec stretch with pt seated with half foam roll mid back     Patient Education and Home Exercises     Home Exercises Provided and Patient Education Provided     Education provided:   - See above    Written Home Exercises Provided: Patient instructed to cont prior HEP. Exercises were reviewed and Diego was able to demonstrate them prior to the end of the session.  Diego demonstrated good  understanding of the education provided. See EMR under Patient Instructions for exercises provided during therapy sessions    ASSESSMENT     Patient with good tolerance to exercises performed. He continues to require verbal cues throughout session for postural awareness and proper technique with exercise. He is progressing well towards discharge. Continue per POC towards treatment session.   Diego Is progressing well towards his goals.   Pt prognosis is Good.     Pt will continue to benefit from skilled outpatient physical therapy to address the deficits listed in the problem list box on initial evaluation, provide pt/family education and to maximize pt's level of independence in the home and community environment.     Pt's  spiritual, cultural and educational needs considered and pt agreeable to plan of care and goals.     Anticipated barriers to physical therapy: co-morbidities    Goals:   Short Term Goals: 3 weeks (Progressing, Not Met)  1.Report decreased right shoulder pain < / =  5/10  to increase tolerance for ADL's  2. Increase PROM shoulder flexion > 130, abduction > 85   3. Increased strength to 3+/5  MMT grade in right shoulder flexion and abduction to increase tolerance for ADL and work activities.  4. Pt to tolerate HEP to improve ROM and independence with ADL's     Long Term Goals: 8 weeks (Progressing, Not Met)  1.Report decreased right shoulder pain  < / =  2 /10  to increase tolerance for ADL's  2.Increase AROM to > 75 degrees of shoulder flexion, and > 60 degrees of shoulder abduction  3.Increase strength to >/= 4/5 in right shoulder flexion and abduction to increase tolerance for ADL and work activities.  4. 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 Pt will have improved gcode of CJ (20-40% limited) on FOTO shoulder in order to demonstrate true functional improvement.     PLAN     Plan of care Certification: 3/14/2023 to 6/09/2023.     Outpatient Physical Therapy 2 times weekly for 8 weeks to include the following interventions: Cervical/Lumbar Traction, Electrical Stimulation , Manual Therapy, Moist Heat/ Ice, Neuromuscular Re-ed, Patient Education, Self Care, Therapeutic Activities, and Therapeutic Exercise.     Tracey Ruiz, PTA

## 2023-05-05 LAB — M TB DNA SPEC QL NAA+PROBE: ABNORMAL

## 2023-05-08 ENCOUNTER — CLINICAL SUPPORT (OUTPATIENT)
Dept: REHABILITATION | Facility: HOSPITAL | Age: 83
End: 2023-05-08
Payer: MEDICARE

## 2023-05-08 DIAGNOSIS — M25.611 DECREASED RANGE OF MOTION OF RIGHT SHOULDER: ICD-10-CM

## 2023-05-08 DIAGNOSIS — M25.511 CHRONIC RIGHT SHOULDER PAIN: ICD-10-CM

## 2023-05-08 DIAGNOSIS — G89.29 CHRONIC RIGHT SHOULDER PAIN: ICD-10-CM

## 2023-05-08 DIAGNOSIS — R29.898 DECREASED STRENGTH OF UPPER EXTREMITY: Primary | ICD-10-CM

## 2023-05-08 PROCEDURE — 97112 NEUROMUSCULAR REEDUCATION: CPT

## 2023-05-08 NOTE — PROGRESS NOTES
[facility]   Physical Therapy Treatment Note    Name: Diego TIRADO [facility] Number: [id number]    Therapy Diagnosis:   Encounter Diagnoses   Name Primary?    Decreased strength of upper extremity Yes    Decreased range of motion of right shoulder     Chronic right shoulder pain      Physician: Wilmer Muñiz PA*    Visit Date: 5/8/2023    Physician Orders: PT Eval and Treat   Medical Diagnosis from Referral: Chronic right shoulder pain [M25.511, G89.29], Adhesive capsulitis of right shoulder [M75.01], Winging of scapula [M95.8]  Evaluation Date: 3/14/2023  Authorization Period Expiration: 5/12/2023  Plan of Care Expiration: 6/09/2023  Progress Note Due: 4/04/2023  Visit # / Visits authorized: 11 / 24 + eval  FOTO: 2/3    - IE; 46% Limitation   - 7th visit; 42% Limitation    PTA Visit #: 2 / 5     Time In: 10:01 AM  Time Out: 10:55 AM  Total Treatment Time: 54 minutes  Total Billable Time: 54 minutes (4 NMR)    Precautions: Standard and Diabetes     SUBJECTIVE     Patient reports: Diego reports no new complaints this date. He states he is feeling good.   He was compliant with home exercise program.  Response to previous treatment: appropriate muscle response  Functional change: Ongoing    Pain: 0/10, currently  Location: Right shoulder    Patient goals: get it working, be able to lift it up overhead    OBJECTIVE     Objective Measures updated at progress report unless specified.     Treatment     Diego received the treatments listed below:      neuromuscular re-education activities to improve: Balance, Coordination, Kinesthetic, Sense, Proprioception, and Posture for 55 minutes, including:  Patient education:   - reviewed updated HEP (see patient instructions on 4/27)   - postural awareness  Aerobic Activity to improve UE strength and mobility; UBE for 10 minutes (5' fwd/5' bwd), Level 3  Seated thoracic extension over 1/2 foam; 5-10 second hold, 3 minutes  Posterior shoulder  rolls; 2 x 10 reps (increased difficulty with technique) - tactile cueing improves scapular retraction  Seated no moneys RTB - tactile and verbal cues for scapular retraction; 4 x 10 reps   Standing rows with Blue TB; 3 x 20 reps  Seated shoulder flexion with 3lb dowel; 4 x 10 reps - pain free range  Modified serratus slides with thoracic extension focus; 3 x 10 reps seated in chair with 1/2 foam blue bolster  Hitchhikers at 1/2 wall; 3 x 10 reps  OH shoulder lift to shelf (30 inches) with 1lb DB; 10 reps   Wall ball circles (yellow ball) 30 clockwise/counter clockwise x 2 rounds  Supine shoulder abduction Yellow TB 3 x 15    manual therapy techniques: Joint mobilizations, Manual traction, and Soft tissue Mobilization were applied to the: R shoulder for 00 minutes, including:  Glenohumeral AP mobilization to R shoulder   Right shoulder PROM and stretching into flexion and abduction   Manual pec stretch with pt seated with half foam roll mid back     Patient Education and Home Exercises     Home Exercises Provided and Patient Education Provided     Education provided:   - See above    Written Home Exercises Provided: Patient instructed to cont prior HEP. Exercises were reviewed and Diego was able to demonstrate them prior to the end of the session.  Diego demonstrated good  understanding of the education provided. See EMR under Patient Instructions for exercises provided during therapy sessions    ASSESSMENT     Interventions continue to focus on improving range of motion deficits and gross overhead lifting and reaching tasks to assist with these outside of therapy. Patient significantly limited with these exercises/motions due to pain and chronic limitations from past medical history; therefore, significant compensatory strategies utilized to perform these tasks. Continued use of verbal and tactile cues throughout to assist in improving periscapular motor control and activation. Will continue with plan of care and  progress as tolerated.     Diego Is progressing well towards his goals.   Pt prognosis is Good.     Pt will continue to benefit from skilled outpatient physical therapy to address the deficits listed in the problem list box on initial evaluation, provide pt/family education and to maximize pt's level of independence in the home and community environment.     Pt's spiritual, cultural and educational needs considered and pt agreeable to plan of care and goals.     Anticipated barriers to physical therapy: co-morbidities    Goals:   Short Term Goals: 3 weeks (Progressing, Not Met)  1.Report decreased right shoulder pain < / =  5/10  to increase tolerance for ADL's  2. Increase PROM shoulder flexion > 130, abduction > 85   3. Increased strength to 3+/5  MMT grade in right shoulder flexion and abduction to increase tolerance for ADL and work activities.  4. Pt to tolerate HEP to improve ROM and independence with ADL's     Long Term Goals: 8 weeks (Progressing, Not Met)  1.Report decreased right shoulder pain  < / =  2 /10  to increase tolerance for ADL's  2.Increase AROM to > 75 degrees of shoulder flexion, and > 60 degrees of shoulder abduction  3.Increase strength to >/= 4/5 in right shoulder flexion and abduction to increase tolerance for ADL and work activities.  4. 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 Pt will have improved gcode of CJ (20-40% limited) on FOTO shoulder in order to demonstrate true functional improvement.     PLAN     Plan of care Certification: 3/14/2023 to 6/09/2023.     Outpatient Physical Therapy 2 times weekly for 8 weeks to include the following interventions: Cervical/Lumbar Traction, Electrical Stimulation , Manual Therapy, Moist Heat/ Ice, Neuromuscular Re-ed, Patient Education, Self Care, Therapeutic Activities, and Therapeutic Exercise.     Vielka Duckworth, PT

## 2023-05-09 ENCOUNTER — LAB VISIT (OUTPATIENT)
Dept: LAB | Facility: HOSPITAL | Age: 83
End: 2023-05-09
Attending: INTERNAL MEDICINE
Payer: MEDICARE

## 2023-05-09 DIAGNOSIS — A31.0 MYCOBACTERIUM AVIUM-INTRACELLULARE COMPLEX: ICD-10-CM

## 2023-05-09 PROCEDURE — 87015 SPECIMEN INFECT AGNT CONCNTJ: CPT | Performed by: INTERNAL MEDICINE

## 2023-05-09 PROCEDURE — 87206 SMEAR FLUORESCENT/ACID STAI: CPT | Performed by: INTERNAL MEDICINE

## 2023-05-09 PROCEDURE — 87116 MYCOBACTERIA CULTURE: CPT | Performed by: INTERNAL MEDICINE

## 2023-05-11 ENCOUNTER — TELEPHONE (OUTPATIENT)
Dept: INFECTIOUS DISEASES | Facility: CLINIC | Age: 83
End: 2023-05-11
Payer: MEDICARE

## 2023-05-16 ENCOUNTER — ANESTHESIA (OUTPATIENT)
Dept: ENDOSCOPY | Facility: HOSPITAL | Age: 83
End: 2023-05-16
Payer: MEDICARE

## 2023-05-16 ENCOUNTER — ANESTHESIA EVENT (OUTPATIENT)
Dept: ENDOSCOPY | Facility: HOSPITAL | Age: 83
End: 2023-05-16
Payer: MEDICARE

## 2023-05-16 ENCOUNTER — HOSPITAL ENCOUNTER (OUTPATIENT)
Facility: HOSPITAL | Age: 83
Discharge: HOME | End: 2023-05-16
Attending: INTERNAL MEDICINE | Admitting: INTERNAL MEDICINE
Payer: MEDICARE

## 2023-05-16 VITALS
WEIGHT: 163 LBS | TEMPERATURE: 98 F | HEIGHT: 69 IN | DIASTOLIC BLOOD PRESSURE: 66 MMHG | SYSTOLIC BLOOD PRESSURE: 130 MMHG | OXYGEN SATURATION: 98 % | BODY MASS INDEX: 24.14 KG/M2 | HEART RATE: 81 BPM | RESPIRATION RATE: 18 BRPM

## 2023-05-16 DIAGNOSIS — D64.9 ANEMIA, UNSPECIFIED TYPE: ICD-10-CM

## 2023-05-16 PROCEDURE — 43235 EGD DIAGNOSTIC BRUSH WASH: CPT | Mod: 51,GC,, | Performed by: INTERNAL MEDICINE

## 2023-05-16 PROCEDURE — 25000003 PHARM REV CODE 250: Performed by: NURSE ANESTHETIST, CERTIFIED REGISTERED

## 2023-05-16 PROCEDURE — 88305 TISSUE EXAM BY PATHOLOGIST: CPT | Mod: 26,,, | Performed by: PATHOLOGY

## 2023-05-16 PROCEDURE — 45380 COLONOSCOPY AND BIOPSY: CPT | Performed by: INTERNAL MEDICINE

## 2023-05-16 PROCEDURE — E9220 PRA ENDO ANESTHESIA: ICD-10-PCS | Mod: ,,, | Performed by: NURSE ANESTHETIST, CERTIFIED REGISTERED

## 2023-05-16 PROCEDURE — 45380 PR COLONOSCOPY,BIOPSY: ICD-10-PCS | Mod: GC,,, | Performed by: INTERNAL MEDICINE

## 2023-05-16 PROCEDURE — 45380 COLONOSCOPY AND BIOPSY: CPT | Mod: GC,,, | Performed by: INTERNAL MEDICINE

## 2023-05-16 PROCEDURE — 88305 TISSUE EXAM BY PATHOLOGIST: CPT | Performed by: PATHOLOGY

## 2023-05-16 PROCEDURE — 37000009 HC ANESTHESIA EA ADD 15 MINS: Performed by: INTERNAL MEDICINE

## 2023-05-16 PROCEDURE — 25000003 PHARM REV CODE 250: Performed by: INTERNAL MEDICINE

## 2023-05-16 PROCEDURE — E9220 PRA ENDO ANESTHESIA: HCPCS | Mod: ,,, | Performed by: NURSE ANESTHETIST, CERTIFIED REGISTERED

## 2023-05-16 PROCEDURE — 63600175 PHARM REV CODE 636 W HCPCS: Performed by: NURSE ANESTHETIST, CERTIFIED REGISTERED

## 2023-05-16 PROCEDURE — 37000008 HC ANESTHESIA 1ST 15 MINUTES: Performed by: INTERNAL MEDICINE

## 2023-05-16 PROCEDURE — 43235 PR EGD, FLEX, DIAGNOSTIC: ICD-10-PCS | Mod: 51,GC,, | Performed by: INTERNAL MEDICINE

## 2023-05-16 PROCEDURE — 27201012 HC FORCEPS, HOT/COLD, DISP: Performed by: INTERNAL MEDICINE

## 2023-05-16 PROCEDURE — 43235 EGD DIAGNOSTIC BRUSH WASH: CPT | Performed by: INTERNAL MEDICINE

## 2023-05-16 PROCEDURE — 88305 TISSUE EXAM BY PATHOLOGIST: ICD-10-PCS | Mod: 26,,, | Performed by: PATHOLOGY

## 2023-05-16 RX ORDER — PROPOFOL 10 MG/ML
VIAL (ML) INTRAVENOUS
Status: DISCONTINUED | OUTPATIENT
Start: 2023-05-16 | End: 2023-05-16

## 2023-05-16 RX ORDER — SODIUM CHLORIDE 9 MG/ML
INJECTION, SOLUTION INTRAVENOUS CONTINUOUS
Status: DISCONTINUED | OUTPATIENT
Start: 2023-05-16 | End: 2023-05-16 | Stop reason: HOSPADM

## 2023-05-16 RX ORDER — LIDOCAINE HYDROCHLORIDE 20 MG/ML
INJECTION INTRAVENOUS
Status: DISCONTINUED | OUTPATIENT
Start: 2023-05-16 | End: 2023-05-16

## 2023-05-16 RX ORDER — PROPOFOL 10 MG/ML
VIAL (ML) INTRAVENOUS CONTINUOUS PRN
Status: DISCONTINUED | OUTPATIENT
Start: 2023-05-16 | End: 2023-05-16

## 2023-05-16 RX ORDER — PHENYLEPHRINE HYDROCHLORIDE 10 MG/ML
INJECTION INTRAVENOUS
Status: DISCONTINUED | OUTPATIENT
Start: 2023-05-16 | End: 2023-05-16

## 2023-05-16 RX ADMIN — SODIUM CHLORIDE: 0.9 INJECTION, SOLUTION INTRAVENOUS at 08:05

## 2023-05-16 RX ADMIN — PROPOFOL 70 MG: 10 INJECTION, EMULSION INTRAVENOUS at 08:05

## 2023-05-16 RX ADMIN — PHENYLEPHRINE HYDROCHLORIDE 100 MCG: 10 INJECTION INTRAVENOUS at 09:05

## 2023-05-16 RX ADMIN — Medication 200 MCG/KG/MIN: at 08:05

## 2023-05-16 RX ADMIN — PROPOFOL 20 MG: 10 INJECTION, EMULSION INTRAVENOUS at 08:05

## 2023-05-16 RX ADMIN — LIDOCAINE HYDROCHLORIDE 100 MG: 20 INJECTION INTRAVENOUS at 08:05

## 2023-05-16 RX ADMIN — PHENYLEPHRINE HYDROCHLORIDE 50 MCG: 10 INJECTION INTRAVENOUS at 09:05

## 2023-05-16 NOTE — [employer] PATIENT INSTRUCTIONS
Discharge Summary/Instructions after an Endoscopic Procedure  Patient Name: Diego Gunter  Patient MRN: [id number]  Patient YOB: 1940  Tuesday, May 16, 2023  Buster Sequeira MD  Dear patient,  As a result of recent federal legislation (The Federal Cures Act), you may   receive lab or pathology results from your procedure in your [facility]   account before your physician is able to contact you. Your physician or   their representative will relay the results to you with their   recommendations at their soonest availability.  Thank you,  RESTRICTIONS:  During your procedure today, you received medications for sedation.  These   medications may affect your judgment, balance and coordination.  Therefore,   for 24 hours, you have the following restrictions:   - DO NOT drive a car, operate machinery, make legal/financial decisions,   sign important papers or drink alcohol.    ACTIVITY:  Today: no heavy lifting, straining or running due to procedural   sedation/anesthesia.  The following day: return to full activity including work.  DIET:  Eat and drink normally unless instructed otherwise.     TREATMENT FOR COMMON SIDE EFFECTS:  - Mild abdominal pain, nausea, belching, bloating or excessive gas:  rest,   eat lightly and use a heating pad.  - Sore Throat: treat with throat lozenges and/or gargle with warm salt   water.  - Because air was used during the procedure, expelling large amounts of air   from your rectum or belching is normal.  - If a bowel prep was taken, you may not have a bowel movement for 1-3 days.    This is normal.  SYMPTOMS TO WATCH FOR AND REPORT TO YOUR PHYSICIAN:  1. Abdominal pain or bloating, other than gas cramps.  2. Chest pain.  3. Back pain.  4. Signs of infection such as: chills or fever occurring within 24 hours   after the procedure.  5. Rectal bleeding, which would show as bright red, maroon, or black stools.   (A tablespoon of blood from the rectum is not serious, especially if    hemorrhoids are present.)  6. Vomiting.  7. Weakness or dizziness.  GO DIRECTLY TO THE NEAREST EMERGENCY ROOM IF YOU HAVE ANY OF THE FOLLOWING:      Difficulty breathing              Chills and/or fever over 101 F   Persistent vomiting and/or vomiting blood   Severe abdominal pain   Severe chest pain   Black, tarry stools   Bleeding- more than one tablespoon   Any other symptom or condition that you feel may need urgent attention  Your doctor recommends these additional instructions:  If any biopsies were taken, your doctors clinic will contact you in 1 to 2   weeks with any results.  - Discharge patient to home.   - Patient has a contact number available for emergencies.  The signs and   symptoms of potential delayed complications were discussed with the   patient.  Return to normal activities tomorrow.  Written discharge   instructions were provided to the patient.   - Resume previous diet.   - Continue present medications.   - Await pathology results.   - No recommendation at this time regarding repeat colonoscopy due to age.  For questions, problems or results please call your physician - Buster Sequeira MD at Work:  [phone].  [facility] [place], EMERGENCY ROOM PHONE NUMBER: [phone]  IF A COMPLICATION OR EMERGENCY SITUATION ARISES AND YOU ARE UNABLE TO REACH   YOUR PHYSICIAN - GO DIRECTLY TO THE EMERGENCY ROOM.  Buster Sequeira MD  5/16/2023 9:42:10 AM  This report has been verified and signed electronically.  Dear patient,  As a result of recent federal legislation (The Federal Cures Act), you may   receive lab or pathology results from your procedure in your [facility]   account before your physician is able to contact you. Your physician or   their representative will relay the results to you with their   recommendations at their soonest availability.  Thank you,  [employer]ATION   never used

## 2023-05-16 NOTE — TRANSFER OF CARE
"Anesthesia Transfer of Care Note    Patient: Diego Gunter    Procedure(s) Performed: Procedure(s) (LRB):  EGD (ESOPHAGOGASTRODUODENOSCOPY) (N/A)  COLONOSCOPY (N/A)    Patient location: PACU    Anesthesia Type: general    Transport from OR: Transported from OR on room air with adequate spontaneous ventilation    Post pain: adequate analgesia    Post assessment: no apparent anesthetic complications    Post vital signs: stable    Level of consciousness: responds to stimulation    Nausea/Vomiting: no nausea/vomiting    Complications: none    Transfer of care protocol was followed      Last vitals:   Visit Vitals  /60 (BP Location: Left arm, Patient Position: Lying)   Pulse (!) 58   Temp 36.5 °C (97.7 °F) (Temporal)   Resp 14   Ht 5' 9" (1.753 m)   Wt 73.9 kg (163 lb)   SpO2 98%   BMI 24.07 kg/m²     "

## 2023-05-16 NOTE — PROVATION PATIENT INSTRUCTIONS
Discharge Summary/Instructions after an Endoscopic Procedure  Patient Name: Diego Gunter  Patient MRN: [id number]  Patient YOB: 1940  Tuesday, May 16, 2023  Buster Sequeira MD  Dear patient,  As a result of recent federal legislation (The Federal Cures Act), you may   receive lab or pathology results from your procedure in your [facility]   account before your physician is able to contact you. Your physician or   their representative will relay the results to you with their   recommendations at their soonest availability.  Thank you,  RESTRICTIONS:  During your procedure today, you received medications for sedation.  These   medications may affect your judgment, balance and coordination.  Therefore,   for 24 hours, you have the following restrictions:   - DO NOT drive a car, operate machinery, make legal/financial decisions,   sign important papers or drink alcohol.    ACTIVITY:  Today: no heavy lifting, straining or running due to procedural   sedation/anesthesia.  The following day: return to full activity including work.  DIET:  Eat and drink normally unless instructed otherwise.     TREATMENT FOR COMMON SIDE EFFECTS:  - Mild abdominal pain, nausea, belching, bloating or excessive gas:  rest,   eat lightly and use a heating pad.  - Sore Throat: treat with throat lozenges and/or gargle with warm salt   water.  - Because air was used during the procedure, expelling large amounts of air   from your rectum or belching is normal.  - If a bowel prep was taken, you may not have a bowel movement for 1-3 days.    This is normal.  SYMPTOMS TO WATCH FOR AND REPORT TO YOUR PHYSICIAN:  1. Abdominal pain or bloating, other than gas cramps.  2. Chest pain.  3. Back pain.  4. Signs of infection such as: chills or fever occurring within 24 hours   after the procedure.  5. Rectal bleeding, which would show as bright red, maroon, or black stools.   (A tablespoon of blood from the rectum is not serious, especially if    hemorrhoids are present.)  6. Vomiting.  7. Weakness or dizziness.  GO DIRECTLY TO THE NEAREST EMERGENCY ROOM IF YOU HAVE ANY OF THE FOLLOWING:      Difficulty breathing              Chills and/or fever over 101 F   Persistent vomiting and/or vomiting blood   Severe abdominal pain   Severe chest pain   Black, tarry stools   Bleeding- more than one tablespoon   Any other symptom or condition that you feel may need urgent attention  Your doctor recommends these additional instructions:  If any biopsies were taken, your doctors clinic will contact you in 1 to 2   weeks with any results.  - Perform a colonoscopy now.   - See colonoscopy report for further recommendations.  For questions, problems or results please call your physician - Buster Sequeira MD at Work:  [phone].  [facility], EMERGENCY ROOM PHONE NUMBER: [phone]  IF A COMPLICATION OR EMERGENCY SITUATION ARISES AND YOU ARE UNABLE TO REACH   YOUR PHYSICIAN - GO DIRECTLY TO THE EMERGENCY ROOM.  Buster Sequeira MD  5/16/2023 8:57:56 AM  This report has been verified and signed electronically.  Dear patient,  As a result of recent federal legislation (The Federal Cures Act), you may   receive lab or pathology results from your procedure in your [facility]   account before your physician is able to contact you. Your physician or   their representative will relay the results to you with their   recommendations at their soonest availability.  Thank you,  PROVATION

## 2023-05-16 NOTE — PLAN OF CARE
Discharge instructions and education provided. Discharge home in stable conditions with responsible party.

## 2023-05-16 NOTE — H&P
Short Stay Endoscopy History and Physical    PCP - Portia Ferreira MD    Procedure - EGD and colonoscopy    ASA - per anesthesia  Mallampati - per anesthesia  History of Anesthesia problems - no  Family history Anesthesia problems - no     HPI:  Diego Gunter a 82 y.o. male here with PMH significant for antiphospholipid syndrome (not on anticoagulation) here for evaluation of anemia noted in 11/2022. He denies abdominal pain, melena, and hematochezia.       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), Hard of hearing, colonic polyps, colonic polyps, Hyperlipidemia associated with type 2 diabetes mellitus, Hyperlipidemia LDL goal <100, Hypernatremia (05/05/2022), Hypertension associated with type 2 diabetes mellitus, Hypertension associated with type 2 diabetes mellitus, Hypotension, Intermittent confusion (04/29/2022), Overweight(278.02), Prostate cancer (09/2011), Pulmonary embolism, Reducible right inguinal hernia (10/10/2022), Right hemiparesis (07/22/2022), Type II or unspecified type diabetes mellitus without mention of complication, not stated as uncontrolled, and Urinary retention (04/29/2022).    Surgical History:  has a past surgical history that includes Prostatectomy; Cataract extraction, bilateral (2014); Bronchoscopy (N/A, 10/15/2018); Posterior cervical laminectomy (Bilateral, 4/28/2022); repair, hernia, inguinal, without history of prior repair, age 5 years or older (Right, 10/10/2022); decompression, nerve, ulnar  (Right, 1/11/2023); and Carpal tunnel release (Right, 1/11/2023).    Family History: family history includes Colon cancer in his mother; Dementia in his brother; Diabetes in his brother, brother, maternal aunt, mother, sister, and another family member; Heart disease in his father; Lung cancer in his brother; Mental illness in his sister; Myasthenia gravis in his brother; Other in his brother; Parkinsonism in his brother; Pulmonary fibrosis in his brother.. Otherwise no colon cancer, inflammatory bowel disease, or GI malignancies.    Social History:  reports that he quit smoking about 60 years ago. His smoking use included cigarettes. He has a 1.25 pack-year smoking history. He has quit using smokeless tobacco. He reports that he does not currently use alcohol. He reports that he does not use drugs.    Review of patient's allergies indicates:  No Known Allergies    Medications:   Medications Prior to Admission   Medication Sig Dispense Refill Last Dose    albuterol (VENTOLIN HFA) 90 mcg/actuation inhaler Inhale 2 puffs into the lungs daily as needed (30 min before arikayce). Rescue 18 g 11     ARIKAYCE 590 mg/8.4 mL NbSp    5/15/2023    aspirin (ECOTRIN) 81 MG EC tablet Take 81 mg by mouth once daily.   5/15/2023    azithromycin (ZITHROMAX) 500 MG tablet Take 1 tablet (500 mg total) by mouth once daily. 90 tablet 5 5/15/2023    b complex vitamins capsule Take 1 capsule by mouth once daily.   5/15/2023    ethambutoL (MYAMBUTOL) 400 MG Tab Take 2 tablets (800 mg total) by mouth once daily. 180 tablet 5 5/15/2023    nebulizer and compressor (OMBRA COMPRESSOR SYSTEM) Marcy use to administer nebulized medication as directed 1 each 0 5/15/2023    pravastatin (PRAVACHOL) 10 MG tablet Take 1 tablet (10 mg total) by mouth once daily. 90 tablet 1 5/15/2023    sodium chloride 3% 3 % nebulizer solution USE 4 ML VIAL IN NEBULIZER  TWICE DAILY 480 mL 11 5/15/2023    sodium chloride 7% 7 % nebulizer solution use one vial in  nebulizer twice a day   5/15/2023    azelastine (ASTELIN) 137 mcg (0.1 %) nasal spray 2 sprays (274 mcg total) by Nasal route 2 (two) times daily. 30 mL 0 More than a month         Objective Findings:    Vital Signs: see nursing notes    Physical Exam:  General Appearance: Well appearing in no acute distress  Eyes:    No scleral icterus  ENT: Neck supple  Lungs: CTA anteriorly  Heart:  S1, S2 normal, no murmurs heard  Abdomen: Soft, non tender, non distended with positive bowel sounds. No hepatosplenomegaly, ascites, or mass  Extremities: no edema  Skin: No rash      Labs:  Lab Results   Component Value Date    WBC 6.00 11/04/2022    HGB 10.1 (L) 11/04/2022    HCT 31.2 (L) 11/04/2022     11/04/2022    CHOL 135 03/08/2023    TRIG 121 03/08/2023    HDL 40 03/08/2023    ALT 18 03/08/2023    AST 18 03/08/2023     03/08/2023    K 4.4 03/08/2023     03/08/2023    CREATININE 1.0 03/08/2023    BUN 18 03/08/2023    CO2 30 (H) 03/08/2023    TSH 3.649 04/28/2022    PSA 0.02 10/28/2015    INR 1.1 09/19/2022    HGBA1C 5.5 03/08/2023         Assessment:   Diego Gunter is a 82 y.o. male presenting today for an EGD/colonoscopy for evaluation of anemia.       Plan:   -Proceed with scheduled procedure today. I have explained the risks and benefits of endoscopy procedures to the patient including but not limited to bleeding, perforation, infection, and death.  -Evaluation and consent for anesthesia portion of this procedure completed by anesthesia team.         Javan Haynes MD, PGY-V  Gastroenterology Fellow  [facility]

## 2023-05-16 NOTE — ANESTHESIA PREPROCEDURE EVALUATION
05/16/2023  Diego Gunter is a 82 y.o., male.  Past Medical History:   Diagnosis Date    Acute encephalopathy     Anemia     Antiphospholipid syndrome 11/19/2021    Carpal tunnel syndrome on right 01/10/2023    Centrilobular emphysema     COPD (chronic obstructive pulmonary disease)     Cubital tunnel syndrome on right 01/10/2023    Dysphagia     Dysphagia, oropharyngeal 06/17/2016    - improved with speech therapy    Functional belching disorder 12/07/2021    Hard of hearing     Hx of colonic polyps     followed by GI. Dr. Pham    Hx of colonic polyps     followed by GI. Dr. Pham    Hyperlipidemia associated with type 2 diabetes mellitus     Hyperlipidemia LDL goal <100     Hypernatremia 05/05/2022    Hypertension associated with type 2 diabetes mellitus     Hypertension associated with type 2 diabetes mellitus     Hypotension     Intermittent confusion 04/29/2022    Overweight(278.02)     Prostate cancer 09/2011    followed by urology, Dr. Rogers    Pulmonary embolism     Reducible right inguinal hernia 10/10/2022    Right hemiparesis 07/22/2022    Secondary to cervical hematoma    Type II or unspecified type diabetes mellitus without mention of complication, not stated as uncontrolled     diet controlled    Urinary retention 04/29/2022     Past Surgical History:   Procedure Laterality Date    BRONCHOSCOPY N/A 10/15/2018    Procedure: BRONCHOSCOPY;  Surgeon: Pratibha Diagnostic Provider;  Location: [facility] OR [address] [place] [address];  Service: Anesthesiology;  Laterality: N/A;    CARPAL TUNNEL RELEASE Right 1/11/2023    Procedure: RELEASE, CARPAL TUNNEL;  Surgeon: Romero Lazo MD;  Location: [facility];  Service: Orthopedics;  Laterality: Right;    CATARACT EXTRACTION, BILATERAL  2014    DECOMPRESSION, NERVE, ULNAR Right 1/11/2023    Procedure: DECOMPRESSION, NERVE, ULNAR - right cub  jolie and guyon's decompression as well as open right carpal tunnel release;  Surgeon: Romero Lazo MD;  Location: [facility];  Service: Orthopedics;  Laterality: Right;    POSTERIOR CERVICAL LAMINECTOMY Bilateral 4/28/2022    Procedure: LAMINECTOMY, SPINE, CERVICAL, POSTERIOR APPROACH C3-6;  Surgeon: Carlos Miller MD;  Location: [address];  Service: Neurosurgery;  Laterality: Bilateral;    PROSTATECTOMY      REPAIR, HERNIA, INGUINAL, WITHOUT HISTORY OF PRIOR REPAIR, AGE 5 YEARS OR OLDER Right 10/10/2022    Procedure: REPAIR, HERNIA, INGUINAL, WITHOUT HISTORY OF PRIOR REPAIR, AGE 5 YEARS OR OLDER;  Surgeon: Dario Esquivel MD;  Location: [facility] OR [address] [place] [address];  Service: General;  Laterality: Right;  open right inguinal hernia repair with mesh         Pre-op Assessment    I have reviewed the Patient Summary Reports.     I have reviewed the Nursing Notes. I have reviewed the NPO Status.   I have reviewed the Medications.     Review of Systems  Anesthesia Hx:  No problems with previous Anesthesia    Social:  Former Smoker, No Alcohol Use    Cardiovascular:   Hypertension hyperlipidemia    Pulmonary:   COPD    Neurological:   Neuromuscular Disease,    Endocrine:   Diabetes        Physical Exam  General: Well nourished, Cooperative, Alert and Oriented    Airway:  Mallampati: II / II  Mouth Opening: Normal  TM Distance: Normal  Tongue: Normal  Neck ROM: Normal ROM    Dental:  Intact    Chest/Lungs:  Clear to auscultation, Normal Respiratory Rate    Heart:  Rate: Normal  Rhythm: Regular Rhythm        Anesthesia Plan  Type of Anesthesia, risks & benefits discussed:    Anesthesia Type: Gen Natural Airway  Intra-op Monitoring Plan: Standard ASA Monitors  Post Op Pain Control Plan: multimodal analgesia  Induction:  IV  Informed Consent: Informed consent signed with the Patient and all parties understand the risks and agree with anesthesia plan.  All questions answered.   ASA Score: 3  Day of Surgery Review of History &  Physical: H&P Update referred to the surgeon/provider.    Ready For Surgery From Anesthesia Perspective.     .

## 2023-05-16 NOTE — ANESTHESIA POSTPROCEDURE EVALUATION
Anesthesia Post Evaluation    Patient: Diego Gunter    Procedure(s) Performed: Procedure(s) (LRB):  EGD (ESOPHAGOGASTRODUODENOSCOPY) (N/A)  COLONOSCOPY (N/A)    Final Anesthesia Type: general      Level of consciousness: awake and alert  Post-procedure vital signs: reviewed and stable  Pain control: Pain has been treated.  Airway patency: patent    PONV status: Absent or treated.  Anesthetic complications: no      Cardiovascular status: hemodynamically stable  Respiratory status: unassisted  Hydration status: euvolemic            Vitals Value Taken Time   /65 05/16/23 0959   Temp 36.6 °C (97.9 °F) 05/16/23 0944   Pulse 47 05/16/23 0959   Resp 18 05/16/23 0959   SpO2 98 % 05/16/23 0959         No case tracking events are documented in the log.      Pain/Shena Score: Shena Score: 10 (5/16/2023  9:59 AM)

## 2023-05-17 LAB — ACID FAST SUSCEPTIBILITY, SLOW GROWER: ABNORMAL

## 2023-05-22 LAB
FINAL PATHOLOGIC DIAGNOSIS: NORMAL
GROSS: NORMAL
Lab: NORMAL

## 2023-06-15 ENCOUNTER — PATIENT MESSAGE (OUTPATIENT)
Dept: INFECTIOUS DISEASES | Facility: CLINIC | Age: 83
End: 2023-06-15
Payer: MEDICARE

## 2023-06-15 LAB
M TB DNA SPEC QL NAA+PROBE: ABNORMAL
MAYO MISCELLANEOUS RESULT (REF): NORMAL

## 2023-06-20 ENCOUNTER — TELEPHONE (OUTPATIENT)
Dept: FAMILY MEDICINE | Facility: CLINIC | Age: 83
End: 2023-06-20
Payer: MEDICARE

## 2023-06-20 NOTE — TELEPHONE ENCOUNTER
Yes, I can renew. I will complete upon my return to the office next week. We will contact them mid next week when ready for pickup.

## 2023-06-20 NOTE — TELEPHONE ENCOUNTER
"----- Message from Manuelito Randhawa sent at 6/20/2023  2:10 PM CDT -----  Regarding: Addis "wife"  Type: Patient Callback     Who called: Addis "wife"    What is the request in detail: Wife stated that she needs to know if the doctor can fill out the paper to renew the patients handicap decal. Please contact the patients wife as soon as possible.     Can the clinic reply by [facility]? Yes     Would the patient rather a call back or a response via [facility]?Callback     Best call back number: [phone]      Additional Information:    "

## 2023-06-20 NOTE — TELEPHONE ENCOUNTER
Spoke with patient's wife. Let the patient know we will call her when it is ready for pickup next week. Handicap decal paperwork placed in box.

## 2023-06-20 NOTE — TELEPHONE ENCOUNTER
Spoke with the Patient's wife and rescheduled his OV for 09/06/23 with Dr. ERIC Curtis at 8:20am.  Patient verbally understands.

## 2023-06-20 NOTE — TELEPHONE ENCOUNTER
Left a voicemail message for the Patient to call the office back to be rescheduled.  Dr. Ferreira, will not be in the office on that day.

## 2023-06-21 ENCOUNTER — OFFICE VISIT (OUTPATIENT)
Dept: INFECTIOUS DISEASES | Facility: CLINIC | Age: 83
End: 2023-06-21
Payer: MEDICARE

## 2023-06-21 VITALS
TEMPERATURE: 98 F | SYSTOLIC BLOOD PRESSURE: 107 MMHG | BODY MASS INDEX: 23.71 KG/M2 | WEIGHT: 160.06 LBS | HEART RATE: 54 BPM | HEIGHT: 69 IN | DIASTOLIC BLOOD PRESSURE: 62 MMHG

## 2023-06-21 DIAGNOSIS — A31.0 MYCOBACTERIUM AVIUM-INTRACELLULARE COMPLEX: Primary | ICD-10-CM

## 2023-06-21 PROCEDURE — 99215 PR OFFICE/OUTPT VISIT, EST, LEVL V, 40-54 MIN: ICD-10-PCS | Mod: S$GLB,,, | Performed by: INTERNAL MEDICINE

## 2023-06-21 PROCEDURE — 3288F PR FALLS RISK ASSESSMENT DOCUMENTED: ICD-10-PCS | Mod: CPTII,S$GLB,, | Performed by: INTERNAL MEDICINE

## 2023-06-21 PROCEDURE — 3288F FALL RISK ASSESSMENT DOCD: CPT | Mod: CPTII,S$GLB,, | Performed by: INTERNAL MEDICINE

## 2023-06-21 PROCEDURE — 1126F AMNT PAIN NOTED NONE PRSNT: CPT | Mod: CPTII,S$GLB,, | Performed by: INTERNAL MEDICINE

## 2023-06-21 PROCEDURE — 1157F PR ADVANCE CARE PLAN OR EQUIV PRESENT IN MEDICAL RECORD: ICD-10-PCS | Mod: CPTII,S$GLB,, | Performed by: INTERNAL MEDICINE

## 2023-06-21 PROCEDURE — 3074F SYST BP LT 130 MM HG: CPT | Mod: CPTII,S$GLB,, | Performed by: INTERNAL MEDICINE

## 2023-06-21 PROCEDURE — 1159F MED LIST DOCD IN RCRD: CPT | Mod: CPTII,S$GLB,, | Performed by: INTERNAL MEDICINE

## 2023-06-21 PROCEDURE — 3078F DIAST BP <80 MM HG: CPT | Mod: CPTII,S$GLB,, | Performed by: INTERNAL MEDICINE

## 2023-06-21 PROCEDURE — 1126F PR PAIN SEVERITY QUANTIFIED, NO PAIN PRESENT: ICD-10-PCS | Mod: CPTII,S$GLB,, | Performed by: INTERNAL MEDICINE

## 2023-06-21 PROCEDURE — 99215 OFFICE O/P EST HI 40 MIN: CPT | Mod: S$GLB,,, | Performed by: INTERNAL MEDICINE

## 2023-06-21 PROCEDURE — 1101F PT FALLS ASSESS-DOCD LE1/YR: CPT | Mod: CPTII,S$GLB,, | Performed by: INTERNAL MEDICINE

## 2023-06-21 PROCEDURE — 1157F ADVNC CARE PLAN IN RCRD: CPT | Mod: CPTII,S$GLB,, | Performed by: INTERNAL MEDICINE

## 2023-06-21 PROCEDURE — 3074F PR MOST RECENT SYSTOLIC BLOOD PRESSURE < 130 MM HG: ICD-10-PCS | Mod: CPTII,S$GLB,, | Performed by: INTERNAL MEDICINE

## 2023-06-21 PROCEDURE — 1159F PR MEDICATION LIST DOCUMENTED IN MEDICAL RECORD: ICD-10-PCS | Mod: CPTII,S$GLB,, | Performed by: INTERNAL MEDICINE

## 2023-06-21 PROCEDURE — 3078F PR MOST RECENT DIASTOLIC BLOOD PRESSURE < 80 MM HG: ICD-10-PCS | Mod: CPTII,S$GLB,, | Performed by: INTERNAL MEDICINE

## 2023-06-21 PROCEDURE — 99999 PR PBB SHADOW E&M-EST. PATIENT-LVL III: ICD-10-PCS | Mod: PBBFAC,,, | Performed by: INTERNAL MEDICINE

## 2023-06-21 PROCEDURE — 99499 UNLISTED E&M SERVICE: CPT | Mod: S$GLB,,, | Performed by: INTERNAL MEDICINE

## 2023-06-21 PROCEDURE — 99999 PR PBB SHADOW E&M-EST. PATIENT-LVL III: CPT | Mod: PBBFAC,,, | Performed by: INTERNAL MEDICINE

## 2023-06-21 PROCEDURE — 99499 RISK ADDL DX/OHS AUDIT: ICD-10-PCS | Mod: S$GLB,,, | Performed by: INTERNAL MEDICINE

## 2023-06-21 PROCEDURE — 1101F PR PT FALLS ASSESS DOC 0-1 FALLS W/OUT INJ PAST YR: ICD-10-PCS | Mod: CPTII,S$GLB,, | Performed by: INTERNAL MEDICINE

## 2023-06-26 ENCOUNTER — TELEPHONE (OUTPATIENT)
Dept: FAMILY MEDICINE | Facility: CLINIC | Age: 83
End: 2023-06-26
Payer: MEDICARE

## 2023-06-26 ENCOUNTER — TELEPHONE (OUTPATIENT)
Dept: INFECTIOUS DISEASES | Facility: CLINIC | Age: 83
End: 2023-06-26
Payer: MEDICARE

## 2023-06-26 NOTE — PROGRESS NOTES
INFECTIOUS DISEASE CLINIC  06/26/2023 2:00 PM    Subjective:      Chief Complaint:   Pulmonary MAC follow up     History of Present Illness:    Patient Diego Gunter is a 82 y.o. male  with h/o emphysema and cavitary MAC, recent epidural hematoma, seen in clinic for MAC f/u. Reports compliance with daily azithro/ethambutol/arikacye. Denies side effects. Recently returned from [facility] for son's wedding. Compliant with aerobika and inhaled hypertonic saline. Dropping off sputum cultures monthly. Pt accompanied by wife and son at visit today    Sputum Cultures persistently positive. Susceptibilities from 3/13/23 culture now concerning for macrolide resistance    Organism     MYCOBACTERIUM AVIUM COMPLEX   Antibiotic                   TED (mcg/mL)  Interpretation   ----------------------------------------------------------   Clofazimine                          0.12   Moxifloxacin                            2       I   Clarithromycin                        >64       R   Amikacin (IV)                          64       R   Amikacin (liposomal, inhaled)          64       S   Linezolid                              32       R         Last CT 3/2023  Emphysematous changes redemonstrated.  Bronchiectasis, bronchial wall thickening, fibrosis and volume loss in the right upper lobe and superior segment right lower lobe appears similar to the prior exam.  No new opacities seen.      Organism     MYCOBACTERIUM AVIUM   Antibiotic                   TED (mcg/mL)  Interpretation   ----------------------------------------------------------   Clofazimine                          0.12   Moxifloxacin                            4       R   Clarithromycin                          2       S   Amikacin (IV)                           8       S   Amikacin (liposomal, inhaled)           8       S   Linezolid                              32       R        Takes prilosec for reflux.     Quit smoking 57 y ago.     Had bronch 10/2018 for work up  of lung mass     BRONCHIAL WASH CYTOLOGY:  NEGATIVE EXAM-NO NEOPLASIA IDENTIFIED  NO ORGANISMS IDENTIFIED WITH A GMS STAIN  THE CONTROL STAINED APPROPRIATEL          AFB cultures 10/7/21, 10/28/21, 11/18/21, 12/9/21 all positive MAC     CT 11/2021  1. Continued evolution of previously described right upper lobe cavitary lesion measuring approximately 3.5 x 4.3 x 5.7-cm (previously 3.3 x 4.4 x 5.1-cm). Similar to minimal interval decrease in the amount of surrounding wall thickness, consolidation and surrounding scattered small consolidative multifocal airspace opacities.  Mediastinal lymphadenopathy, not significantly changed.  2. Symmetric moderate-severe centrilobular emphysematous changes with basoapical gradient, not significantly changed.      Review of Symptoms:  Constitutional: Denies fevers, chills, or weakness.  ENT: Denies dysphagia, nasal discharge, ear pain or discharge.  Cardiovascular: Denies chest pain, palpitations, orthopnea, or claudication.  Respiratory: Denies shortness of breath, cough, hemoptysis, or wheezing.  GI: Denies nausea/vomitting, hematochezia, melena, abd pain, or changes in appetite.  : Denies dysuria, incontinence, or hematuria.  Musculoskeletal: Denies joint pain or myalgias.  Skin/breast: Denies rashes, lumps, lesions, or discharge.  Neurologic: Denies headache, dizziness, vertigo, or paresthesias.    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 Dr. Pham    Hx of colonic polyps     followed by GI. Dr. Pham    Hyperlipidemia associated with  type 2 diabetes mellitus     Hyperlipidemia LDL goal <100     Hypernatremia 05/05/2022    Hypertension associated with type 2 diabetes mellitus     Hypertension associated with type 2 diabetes mellitus     Hypotension     Intermittent confusion 04/29/2022    Overweight(278.02)     Prostate cancer 09/2011    followed by urology, Dr. Rogers    Pulmonary embolism     Reducible right inguinal hernia 10/10/2022    Right hemiparesis 07/22/2022    Secondary to cervical hematoma    Type II or unspecified type diabetes mellitus without mention of complication, not stated as uncontrolled     diet controlled    Urinary retention 04/29/2022       Past Surgical History:   Procedure Laterality Date    BRONCHOSCOPY N/A 10/15/2018    Procedure: BRONCHOSCOPY;  Surgeon: [facility];  Location: [facility] OR 2ND FLR;  Service: Anesthesiology;  Laterality: N/A;    CARPAL TUNNEL RELEASE Right 1/11/2023    Procedure: RELEASE, CARPAL TUNNEL;  Surgeon: Romero Lazo MD;  Location: [facility];  Service: Orthopedics;  Laterality: Right;    CATARACT EXTRACTION, BILATERAL  2014    COLONOSCOPY N/A 5/16/2023    Procedure: COLONOSCOPY;  Surgeon: Buster Sequeira MD;  Location: [facility] (4TH FLR);  Service: Endoscopy;  Laterality: N/A;    DECOMPRESSION, NERVE, ULNAR Right 1/11/2023    Procedure: DECOMPRESSION, NERVE, ULNAR - right cub jolie and guyon's decompression as well as open right carpal tunnel release;  Surgeon: Romero Lazo MD;  Location: [facility] OR;  Service: Orthopedics;  Laterality: Right;    ESOPHAGOGASTRODUODENOSCOPY N/A 5/16/2023    Procedure: EGD (ESOPHAGOGASTRODUODENOSCOPY);  Surgeon: Buster Sequeira MD;  Location: [facility] (4TH FLR);  Service: Endoscopy;  Laterality: N/A;  inst mailed-RB  5/10/23 no answer for precall/mleone lpn    POSTERIOR CERVICAL LAMINECTOMY Bilateral 4/28/2022    Procedure: LAMINECTOMY, SPINE, CERVICAL, POSTERIOR APPROACH C3-6;  Surgeon: Carlos Miller MD;  Location: [facility] OR 2ND FLR;  Service:  Neurosurgery;  Laterality: Bilateral;    PROSTATECTOMY      REPAIR, HERNIA, INGUINAL, WITHOUT HISTORY OF PRIOR REPAIR, AGE 5 YEARS OR OLDER Right 10/10/2022    Procedure: REPAIR, HERNIA, INGUINAL, WITHOUT HISTORY OF PRIOR REPAIR, AGE 5 YEARS OR OLDER;  Surgeon: Dario Esquivel MD;  Location: [facility] OR [address] [place] [address];  Service: General;  Laterality: Right;  open right inguinal hernia repair with mesh       Family History   Problem Relation Age of Onset    Colon cancer Mother     Diabetes Mother     Heart disease Father     Mental illness Sister     Diabetes Sister     Other Brother         polio as a child    Pulmonary fibrosis Brother     Diabetes Brother     Myasthenia gravis Brother     Parkinsonism Brother     Diabetes Brother     Dementia Brother     Lung cancer Brother         smoker    Diabetes Maternal Aunt     Diabetes Other     Esophageal cancer Neg Hx        Social History     Socioeconomic History    Marital status:      Spouse name: Addis    Number of children: 2   Occupational History    Occupation: 5 [employer]    Tobacco Use    Smoking status: Former     Packs/day: 0.25     Years: 5.00     Pack years: 1.25     Types: Cigarettes     Quit date: 10/2/1962     Years since quittin.7    Smokeless tobacco: Former    Tobacco comments:     quit age 23   Substance and Sexual Activity    Alcohol use: Not Currently    Drug use: No    Sexual activity: Yes     Partners: Female     Social Determinants of Health     Financial Resource Strain: Low Risk     Difficulty of Paying Living Expenses: Not hard at all   Food Insecurity: No Food Insecurity    Worried About Running Out of Food in the Last Year: Never true    Ran Out of Food in the Last Year: Never true   Transportation Needs: No Transportation Needs    Lack of Transportation (Medical): No    Lack of Transportation (Non-Medical): No   Physical Activity: Inactive    Days of Exercise per Week: 0 days    Minutes of Exercise per Session: 10 min   Stress:  No Stress Concern Present    Feeling of Stress : Only a little   Social Connections: Moderately Integrated    Frequency of Communication with Friends and Family: Twice a week    Frequency of Social Gatherings with Friends and Family: Once a week    Attends Religion Services: More than 4 times per year    Active Member of Clubs or Organizations: No    Attends Club or Organization Meetings: Never    Marital Status:    Housing Stability: Low Risk     Unable to Pay for Housing in the Last Year: No    Number of Places Lived in the Last Year: 1    Unstable Housing in the Last Year: No       Review of patient's allergies indicates:  No Known Allergies      Objective:   VS (24h):   Vitals:    06/21/23 1359   BP: 107/62   Pulse: (!) 54   Temp: 97.8 °F (36.6 °C)     [unfilled]  General: Afebrile, alert, comfortable, no acute distress.   HEENT: TERELL. EOMI, no scleral icterus.   Pulmonary: Non labored,clear to auscultation A/P/L. No wheezing, crackles, or rhonchi.  Cardiac: normal S1 & S2 w/o rubs/murmurs/gallops.   Abdominal: Non-tender, non-distended.  Extremities: Moves all extremities x 4. No peripheral edema.  Skin: No jaundice, rashes, or visible lesions.   Neurological:  Alert and oriented x 4.     Labs:    Glucose   Date Value Ref Range Status   03/08/2023 85 70 - 110 mg/dL Final   10/21/2022 106 70 - 110 mg/dL Final   09/19/2022 106 70 - 110 mg/dL Final       Calcium   Date Value Ref Range Status   03/08/2023 9.9 8.7 - 10.5 mg/dL Final   10/21/2022 9.5 8.7 - 10.5 mg/dL Final   09/19/2022 9.9 8.7 - 10.5 mg/dL Final       Albumin   Date Value Ref Range Status   03/08/2023 4.1 3.5 - 5.2 g/dL Final   10/21/2022 4.0 3.5 - 5.2 g/dL Final   09/19/2022 4.4 3.5 - 5.2 g/dL Final       Total Protein   Date Value Ref Range Status   03/08/2023 7.4 6.0 - 8.4 g/dL Final   10/21/2022 7.4 6.0 - 8.4 g/dL Final   09/19/2022 7.6 6.0 - 8.4 g/dL Final       Sodium   Date Value Ref Range Status   03/08/2023 140 136 - 145 mmol/L  Final   10/21/2022 142 136 - 145 mmol/L Final   09/19/2022 138 136 - 145 mmol/L Final       Potassium   Date Value Ref Range Status   03/08/2023 4.4 3.5 - 5.1 mmol/L Final   10/21/2022 3.9 3.5 - 5.1 mmol/L Final   09/19/2022 4.1 3.5 - 5.1 mmol/L Final       CO2   Date Value Ref Range Status   03/08/2023 30 (H) 23 - 29 mmol/L Final   10/21/2022 24 23 - 29 mmol/L Final   09/19/2022 26 23 - 29 mmol/L Final       Chloride   Date Value Ref Range Status   03/08/2023 104 95 - 110 mmol/L Final   10/21/2022 106 95 - 110 mmol/L Final   09/19/2022 102 95 - 110 mmol/L Final       BUN   Date Value Ref Range Status   03/08/2023 18 8 - 23 mg/dL Final   10/21/2022 11 8 - 23 mg/dL Final   09/19/2022 14 8 - 23 mg/dL Final       Creatinine   Date Value Ref Range Status   03/08/2023 1.0 0.5 - 1.4 mg/dL Final   10/21/2022 1.1 0.5 - 1.4 mg/dL Final   09/19/2022 0.9 0.5 - 1.4 mg/dL Final       Alkaline Phosphatase   Date Value Ref Range Status   03/08/2023 68 55 - 135 U/L Final   10/21/2022 65 55 - 135 U/L Final   09/19/2022 74 55 - 135 U/L Final       ALT   Date Value Ref Range Status   03/08/2023 18 10 - 44 U/L Final   10/21/2022 14 10 - 44 U/L Final   09/19/2022 23 10 - 44 U/L Final       AST   Date Value Ref Range Status   03/08/2023 18 10 - 40 U/L Final   10/21/2022 21 10 - 40 U/L Final   09/19/2022 22 10 - 40 U/L Final       Total Bilirubin   Date Value Ref Range Status   03/08/2023 0.5 0.1 - 1.0 mg/dL Final     Comment:     For infants and newborns, interpretation of results should be based  on gestational age, weight and in agreement with clinical  observations.    Premature Infant recommended reference ranges:  Up to 24 hours.............<8.0 mg/dL  Up to 48 hours............<12.0 mg/dL  3-5 days..................<15.0 mg/dL  6-29 days.................<15.0 mg/dL     10/21/2022 0.8 0.1 - 1.0 mg/dL Final     Comment:     For infants and newborns, interpretation of results should be based  on gestational age, weight and in  agreement with clinical  observations.    Premature Infant recommended reference ranges:  Up to 24 hours.............<8.0 mg/dL  Up to 48 hours............<12.0 mg/dL  3-5 days..................<15.0 mg/dL  6-29 days.................<15.0 mg/dL     09/19/2022 0.4 0.1 - 1.0 mg/dL Final     Comment:     For infants and newborns, interpretation of results should be based  on gestational age, weight and in agreement with clinical  observations.    Premature Infant recommended reference ranges:  Up to 24 hours.............<8.0 mg/dL  Up to 48 hours............<12.0 mg/dL  3-5 days..................<15.0 mg/dL  6-29 days.................<15.0 mg/dL         WBC   Date Value Ref Range Status   11/04/2022 6.00 3.90 - 12.70 K/uL Final   10/21/2022 6.65 3.90 - 12.70 K/uL Final   09/19/2022 10.41 3.90 - 12.70 K/uL Final       Hemoglobin   Date Value Ref Range Status   11/04/2022 10.1 (L) 14.0 - 18.0 g/dL Final   10/21/2022 9.7 (L) 14.0 - 18.0 g/dL Final   09/19/2022 13.3 (L) 14.0 - 18.0 g/dL Final       POC Hematocrit   Date Value Ref Range Status   04/28/2022 34 (L) 36 - 54 %PCV Final   04/28/2022 42 36 - 54 %PCV Final     Hematocrit   Date Value Ref Range Status   11/04/2022 31.2 (L) 40.0 - 54.0 % Final   10/21/2022 30.0 (L) 40.0 - 54.0 % Final   09/19/2022 40.3 40.0 - 54.0 % Final       MCV   Date Value Ref Range Status   11/04/2022 86 82 - 98 fL Final   10/21/2022 84 82 - 98 fL Final   09/19/2022 81 (L) 82 - 98 fL Final       Platelets   Date Value Ref Range Status   11/04/2022 340 150 - 450 K/uL Final   10/21/2022 396 150 - 450 K/uL Final   09/19/2022 275 150 - 450 K/uL Final       Lab Results   Component Value Date    CHOL 135 03/08/2023    CHOL 116 (L) 04/28/2022    CHOL 116 (L) 03/07/2022       Lab Results   Component Value Date    HDL 40 03/08/2023    HDL 36 (L) 04/28/2022    HDL 43 03/07/2022       Lab Results   Component Value Date    LDLCALC 70.8 03/08/2023    LDLCALC 62.4 (L) 04/28/2022    LDLCALC 54.4 (L)  03/07/2022       Lab Results   Component Value Date    TRIG 121 03/08/2023    TRIG 88 04/28/2022    TRIG 93 03/07/2022       Lab Results   Component Value Date    CHOLHDL 29.6 03/08/2023    CHOLHDL 31.0 04/28/2022    CHOLHDL 37.1 03/07/2022       RPR   Date Value Ref Range Status   05/08/2022 Non-reactive Non-reactive Final   10/21/2019 Non-reactive Non-reactive Final     No results found for: QUANTIFERON    Medications:  Current Outpatient Medications on File Prior to Visit   Medication Sig Dispense Refill    ARIKAYCE 590 mg/8.4 mL NbSp       aspirin (ECOTRIN) 81 MG EC tablet Take 81 mg by mouth once daily.      azelastine (ASTELIN) 137 mcg (0.1 %) nasal spray 2 sprays (274 mcg total) by Nasal route 2 (two) times daily. 30 mL 0    azithromycin (ZITHROMAX) 500 MG tablet Take 1 tablet (500 mg total) by mouth once daily. 90 tablet 5    b complex vitamins capsule Take 1 capsule by mouth once daily.      ethambutoL (MYAMBUTOL) 400 MG Tab Take 2 tablets (800 mg total) by mouth once daily. 180 tablet 5    nebulizer and compressor (Wintegra COMPRESSOR SYSTEM) Marcy use to administer nebulized medication as directed 1 each 0    pravastatin (PRAVACHOL) 10 MG tablet Take 1 tablet (10 mg total) by mouth once daily. 90 tablet 1    sodium chloride 3% 3 % nebulizer solution USE 4 ML VIAL IN NEBULIZER  TWICE DAILY 480 mL 11    sodium chloride 7% 7 % nebulizer solution use one vial in nebulizer twice a day      albuterol (VENTOLIN HFA) 90 mcg/actuation inhaler Inhale 2 puffs into the lungs daily as needed (30 min before arikayce). Rescue 18 g 11    [DISCONTINUED] omega-3 fatty acids 1,000 mg Cap Take 2 g by mouth.       No current facility-administered medications on file prior to visit.       Antibiotics:   Antibiotics (From admission, onward)      None            HIV: No components found for: HIV 1/2 AG/AB  Hepatitis C IgG: No components found for: HEPATITIS C  Syphilis:   RPR   Date Value Ref Range Status   05/08/2022 Non-reactive  Non-reactive Final   10/21/2019 Non-reactive Non-reactive Final       Hepatitis A IgG: No components found for: HEPATITIS A IGG  Hepatitis Bc IgG: No components found for: HEPATITIS B CORE IGG  Hepatitis Bs IgG:  Quantiferon: No results found for: QUANTIFERON  VZV IgG: No components found for: VARICELLA IGG    No components found for: SEDIMENTATION RATE  No components found for: C-REACTIVE PROTEIN      Microbiology x 7d:   Microbiology Results (last 7 days)       ** No results found for the last 168 hours. **            Immunization History   Administered Date(s) Administered    COVID-19, MRNA, LN-S, PF (Pfizer) (Purple Cap) 01/13/2021, 04/28/2021, 11/05/2021    Influenza 10/19/2022    Influenza (FLUAD) - Trivalent - Adjuvanted - PF (65+) 09/25/2019    Influenza - High Dose - PF (65 years and older) 09/18/2015, 10/04/2016, 10/23/2017, 09/21/2018    Influenza - Quadrivalent - High Dose - PF (65 years and older) 11/05/2021, 10/18/2022    Influenza - Quadrivalent - PF *Preferred* (6 months and older) 10/06/2014    Influenza A (H1N1) 2009 Monovalent - IM 01/22/2010    Influenza Split 10/10/2011, 10/06/2014    Pneumococcal Conjugate - 13 Valent 10/28/2015    Pneumococcal Polysaccharide - 23 Valent 07/12/2013    Tdap 03/08/2015    Zoster Recombinant 09/25/2019, 12/30/2019         Reviewed records today as well as relevant labs, cultures, and imaging    Assessment:     Pulmonary MAC, macrolide RESISTANT  Cavitary lung lesion  Nodular liver- noted on CT; US abdomen 10/2021 No compelling sonographic evidence of cirrhosis.  empysema  Antiphospholipid antibody syndrome; h/o coumadin, paused after hematoma     Meets ATS/IDSA guidelines for treatment- cough, cavitary lung lesion, MAC from bronch sample. AFB cultures 10/7/21, 10/28/21, 11/18/21, 12/9/21 all positive MAC. CT 11/2021 with RUL cavitary lesion-3.5 x 4.3 x 5.7-cm     Risk factor-   structural: emphysema, bronchiectasis  functional: reflux        Qtc 407 on  3/8/23      Unfortunately updated cultures now with macrolide resistance         No toxicities from antimicrobials  Extremely medically complex  Patient is high risk for infectious complications given medical comorbidities    Plan:     Have asked micro to repeat macrolide susceptibility testing for the positive 3/2023 culture and 4/2023. Continue antibiotics- daily ethambutol and azithromycin until this returns (started 12/2021). If macrolide resistance confirmed on repeat testing, will plan on stopping azithro/ethambutol Continue inhaled amikacin. Consented for clofazamine- have requested drug through [employer] website. Have sent rx for tedezolid and omadacycline to speciality pharmacy. Counseled extensively on side effects. Given baseline hearing issues,estab with audiology for monitoring while on arikayce.     Last CT chest 3/8/23- stable    Counseled extensively on antibiotic side effects. While on ethambutol- self vision screens daily. If blurry vision lasts 2 days in a row, patient known to stop ethambutol and call eye doctor for exam and to let us know that this happened.     Discussed importance of airway clearance. continue Aerobika and nebulized hypertonic saline bid. Recommended decreasing duration of hot showers, avoid soil and water aerosols, use fans in shower area, avoid fine mist shower heads (heavy droplets better), avoid bubbling water (hot tubs, humidifiers), flush water heater frequently to avoid biofilm buildup     Prevent reflux- avoid stomach sleeping. Famotidine/tums. Stop liquids 3hr before bedtime     Monitor afb sputum    - Will monitor drug therapy for toxicity      - The following labs were ordered:    Orders Placed This Encounter   Procedures    CT Chest Without Contrast     Standing Status:   Future     Standing Expiration Date:   6/21/2024     Order Specific Question:   May the Radiologist modify the order per protocol to meet the clinical needs of the patient?     Answer:   Yes     Comprehensive Metabolic Panel     Standing Status:   Standing     Number of Occurrences:   11     Standing Expiration Date:   8/19/2024    C-REACTIVE PROTEIN     Standing Status:   Standing     Number of Occurrences:   11     Standing Expiration Date:   8/19/2024    IN OFFICE EKG 12-LEAD (to [place])     Standing Status:   Standing     Number of Occurrences:   11     Standing Expiration Date:   6/21/2024           I have sent communication to the referring physician and/or primary care provider.     I spent a total of 55 minutes on the day of the visit. This includes face to face time and non-face to face time preparing to see the patient (eg, review of tests), obtaining and/or reviewing separately obtained history, documenting clinical information in the electronic or other health record, independently interpreting results, and communicating results to the patient/family/caregiver, or care coordination.      Paola Rao MD, MPH  Infectious Disease

## 2023-06-26 NOTE — TELEPHONE ENCOUNTER
----- Message from Natalia Estrada MA sent at 6/26/2023  1:19 PM CDT -----  Regarding: FW: meena  Contact: @[phone]    ----- Message -----  From: Dalton Velasco  Sent: 6/26/2023  11:56 AM CDT  To: [facility]ue[facility]a Staff  Subject: adv                                              Pt wife calling to speak with dr or nurse in regards to breathing treatment he has been on stating recently has been coughing up blood and mucus and would like to know what to do ... pls call and [phone]

## 2023-06-26 NOTE — TELEPHONE ENCOUNTER
----- Message from Sharron Osman sent at 6/26/2023  1:36 PM CDT -----  Regarding: [id number]  Type:  Patient Returning Call    Who Called: self    Who Left Message for Patient: Carson     Does the patient know what this is regarding?:yes    Would the patient rather a call back or a response via [facility]? Call back     Best Call Back Number:[phone]      Additional Information: Pt needs handicapp form filled out from the provider.

## 2023-06-26 NOTE — PROGRESS NOTES
RESEARCH ENCOUNTER FOR [id number]/clofazimine: Multiple Patient Program for Lamprene (clofazimine) for the treatment of Non-Tuberculous Mycobacterial Infections    PI: Paola Rao MD  Protocol No.: [id number]    Diego Gunter is a 82 y.o. male with non-tuberculous mycobacterial infection here for consent and screening into the Multiple Patient Program for Lamprene.    Consent:  Diego Gunter was consented with current [facility] for participation in the Multiple Patient Program for Lamprene for the treatment of Non-Tuberculous Mycobacterial (NTM) Infections. The patient was willing and able to sign informed consent. The consent was explained in detail. The patient was given the opportunity to ask questions. I provided my contact information to the patient. The patient was able to adequately summarize: the purpose of the study, the potential benefits/risks associated with the study and possible alternatives, and all procedures, testing, and follow-ups associated with the study. The patient signed the informed consent form. Each page of the consent form was reviewed with the patient and all questions answered satisfactorily. The patient was informed he/she does not have to participate in this study. If they do not participate, their care at [facility] will not be affected. The original consent was scanned into electronic medical records (EPIC) and filed into the subject's research study binder and the patient was given a copy of the signed informed consent.    HPI:  Diego Gunter is a 82 y.o. male with NTM infection diagnosed 10/2018. Prior treatments include azithromycin, ethambutol, rifampin, arikayce.    The patient is unable to access a comparable or satisfactory alternative treatment to treat the NTM infection because drug resistance. Other relevant medical criteria for compassionate use / expanded access macrolide resistance    Patient is ineligible for participation in any ongoing clinical trials with  clofazimine treatment or as recently completed a clinical trial that has been terminated and the clinical team has determined that treatment is necessary and there are no other feasible alternatives for the patient.    Patient is not currently being transferred from an ongoing clinical trial for clofazimine for which they are still eligible.    Thorough medication, medical history, and surgical history reviews were performed. No prohibited medications noted. Please note dates for current medications/medical/surgical history contained in the EMR may not be accurate. Dates verified with source documentation or patient report will take precedence over EMR dates.    Plan:  Diego Gunter is a 82 y.o. male is cleared to proceed with screening procedures for the Multiple Patient Program for Lamprene.    Pregnancy testing ordered: not applicable  ECG ordered: yes          AE Documentation    All AEs should be recorded in an Epic note  Must include the following:  Date the AE began - symptom onset not date of diagnosis  Any treatment related to the event, e.g. hospitalization, OTC and Rx medications, labs/imaging, evaluations by a medical specialist, lifestyle modifications  Description of the AE  Date of resolution  Whether AE is related to study medication/procedure * Must be done by PI/Sub-I  Severity of the AE * must be done by PI/Sub-I  AE should also be recorded on AE log in patient study binder, this is where PI/Sub-I can determine relation to study medication/procedure and rate severity  If a group of symptoms can be attributed to one diagnosis; record diagnosis on AE log, not individual symptoms.  For example, a patient complains of congestion, headache, fever, and cough starting 5/1/2020. A few days later they visited their PCP and were diagnosed with an upper respiratory infection on 5/4/2020.  You would record 'URI' on the AE log along with the date of onset of symptoms 5/1/2020.  The next month, the patient  reports they were sick for around 10 days total. They report symptoms resolved 5/10/2020. This is the date to resolve the AE and any associated treatments.  Should always record any treatments related to symptoms of the AE including OTC medicines. Include medication, dose, form, and dates.  AE stop date should be the end date for any treatments or symptoms whichever is longer. If treatments continue past the resolution of symptoms, the stop date of treatment should be the end of AE.  For example, patient above took Tylenol Cold and Sinus starting 5/2/2020 and their PCP rx Augmentin starting 5/4/2020 x 10 days. Patient states symptoms resolved on 5/10/2020 and they stopped taking Tylenol Cold and Sinus when they felt better BUT he didn't finish Augmentin until 5/14/2020. Therefore, AE stop date should be 5/14/2020.  If a patient remains on a medication for an AE indefinitely, the AE will continue to be ongoing even if symptoms improve. For example, a patient has worsening of T2DM and they are placed on a new diabetic medication. If their Hba1c improves to at or below baseline but they remain on the new diabetic medication, the AE will continue to be ongoing. You cannot resolve an AE without also resolving any medications associated with that AE.  The above scenarios should look like the following in an Epic note:  Patient reports congestion, headache, fever, and cough starting 5/1/2020. Saw PCP on 5/4/2020 diagnosed with URI. Took Tylenol Cold and Sinus every 6 hours as needed from 5/1/2020-5/10/2020. PCP prescribed Augmentin 500mg twice daily for 10 days, patient took 5/4/2020-5/14/2020. URI resolved 5/14/2020.  If PI/Sub-I is recording they can also state relation and severity in their note.  CTCAE Severity  Grade 1: Mild; asymptomatic or mild symptoms; clinical or diagnostic observations only; intervention not indicated.  Grade 2: Moderate; minimal, local or noninvasive intervention indicated; limiting age  appropriate instrumental ADL*. Any AE requiring a medication should be at least Grade 2.  Grade 3: Severe or medically significant but not immediately life-threatening; hospitalization or prolongation of hospitalization indicated; disabling; limiting self care ADL**.  Grade 4: Life-threatening consequences; urgent intervention indicated.  Grade 5: Death related to AE.  Use CTCAE Version 5 when grading AEs. Includes grading for lab values based on thresholds for increasing lab values.   https://ctep.cancer.gov/protocolDevelopment/electronic_applications/docs/CTCAE_v5_Quick_Reference_8.5x11.pdf  If PI/Sub-I is treating AE/side effects of study medication/procedure, they should document in their plan of care in Epic note.  E.g. 'Worsening GERD: Ongoing. Start omeprazole 20 mg daily. Related to study medications.'

## 2023-06-26 NOTE — TELEPHONE ENCOUNTER
Returned call. Answered questions. Pt's wife reports he had mostly mucous with a streak of blood. Denies blood clots, sob. Pause arikayce for 1-2 days then resume. If he spits up chunks of blood or has worsening sob accompanied by blood, would be a medical emergency and should go straight to ed

## 2023-06-27 LAB
ACID FAST MOD KINY STN SPEC: NORMAL
MYCOBACTERIUM SPEC QL CULT: NORMAL

## 2023-07-06 ENCOUNTER — TELEPHONE (OUTPATIENT)
Dept: PHARMACY | Facility: CLINIC | Age: 83
End: 2023-07-06
Payer: MEDICARE

## 2023-07-07 ENCOUNTER — TELEPHONE (OUTPATIENT)
Dept: INFECTIOUS DISEASES | Facility: CLINIC | Age: 83
End: 2023-07-07
Payer: MEDICARE

## 2023-07-07 ENCOUNTER — LAB VISIT (OUTPATIENT)
Dept: LAB | Facility: HOSPITAL | Age: 83
End: 2023-07-07
Attending: INTERNAL MEDICINE
Payer: MEDICARE

## 2023-07-07 DIAGNOSIS — A31.0 MYCOBACTERIUM AVIUM-INTRACELLULARE COMPLEX: ICD-10-CM

## 2023-07-07 LAB
ALBUMIN SERPL BCP-MCNC: 4.5 G/DL (ref 3.5–5.2)
ALP SERPL-CCNC: 62 U/L (ref 55–135)
ALT SERPL W/O P-5'-P-CCNC: 26 U/L (ref 10–44)
ANION GAP SERPL CALC-SCNC: 5 MMOL/L (ref 8–16)
AST SERPL-CCNC: 23 U/L (ref 10–40)
BILIRUB SERPL-MCNC: 0.6 MG/DL (ref 0.1–1)
BUN SERPL-MCNC: 13 MG/DL (ref 8–23)
CALCIUM SERPL-MCNC: 9.6 MG/DL (ref 8.7–10.5)
CHLORIDE SERPL-SCNC: 105 MMOL/L (ref 95–110)
CO2 SERPL-SCNC: 30 MMOL/L (ref 23–29)
CREAT SERPL-MCNC: 1 MG/DL (ref 0.5–1.4)
CRP SERPL-MCNC: 1.5 MG/L (ref 0–8.2)
EST. GFR  (NO RACE VARIABLE): >60 ML/MIN/1.73 M^2
GLUCOSE SERPL-MCNC: 100 MG/DL (ref 70–110)
POTASSIUM SERPL-SCNC: 4.5 MMOL/L (ref 3.5–5.1)
PROT SERPL-MCNC: 7.3 G/DL (ref 6–8.4)
SODIUM SERPL-SCNC: 140 MMOL/L (ref 136–145)

## 2023-07-07 PROCEDURE — 36415 COLL VENOUS BLD VENIPUNCTURE: CPT | Performed by: INTERNAL MEDICINE

## 2023-07-07 PROCEDURE — 86140 C-REACTIVE PROTEIN: CPT | Performed by: INTERNAL MEDICINE

## 2023-07-07 PROCEDURE — 80053 COMPREHEN METABOLIC PANEL: CPT | Performed by: INTERNAL MEDICINE

## 2023-07-07 NOTE — TELEPHONE ENCOUNTER
----- Message from Kristi Morse MA sent at 7/7/2023  9:55 AM CDT -----  Regarding: FW: Appt  Contact: Addis/wife [phone]    ----- Message -----  From: Praveena Alvarez  Sent: 7/7/2023   9:50 AM CDT  To: Maira Guevara Staff  Subject: Appt                                             Addis/wife is calling to speak with provider, pt is having kidney pain and would like to be seen, pt is currently scheduled for 7/25. Please call @[phone]

## 2023-07-10 ENCOUNTER — DOCUMENTATION ONLY (OUTPATIENT)
Dept: INFECTIOUS DISEASES | Facility: CLINIC | Age: 83
End: 2023-07-10
Payer: MEDICARE

## 2023-07-10 ENCOUNTER — TELEPHONE (OUTPATIENT)
Dept: PHARMACY | Facility: CLINIC | Age: 83
End: 2023-07-10
Payer: MEDICARE

## 2023-07-10 ENCOUNTER — PATIENT MESSAGE (OUTPATIENT)
Dept: INFECTIOUS DISEASES | Facility: CLINIC | Age: 83
End: 2023-07-10
Payer: MEDICARE

## 2023-07-10 ENCOUNTER — LAB VISIT (OUTPATIENT)
Dept: LAB | Facility: HOSPITAL | Age: 83
End: 2023-07-10
Attending: INTERNAL MEDICINE
Payer: MEDICARE

## 2023-07-10 DIAGNOSIS — A31.0 MYCOBACTERIUM AVIUM-INTRACELLULARE COMPLEX: ICD-10-CM

## 2023-07-10 PROCEDURE — 87116 MYCOBACTERIA CULTURE: CPT | Performed by: INTERNAL MEDICINE

## 2023-07-10 PROCEDURE — 87206 SMEAR FLUORESCENT/ACID STAI: CPT | Performed by: INTERNAL MEDICINE

## 2023-07-10 PROCEDURE — 87015 SPECIMEN INFECT AGNT CONCNTJ: CPT | Performed by: INTERNAL MEDICINE

## 2023-07-10 NOTE — TELEPHONE ENCOUNTER
[place], this is Kingston Jarrell, clinical pharmacist with [facility] that is part of your care team.  We have begun working on your prescription that your doctor has sent us. Our next steps include:     Working with your insurance company to obtain approval for your medication  Working with you to ensure your medication is affordable     We will be calling you along the way with updates on your medication but if you have any concerns or receive information that you would like to discuss please reach us at [phone].    Welcome call outcome: Patient/caregiver reached

## 2023-07-10 NOTE — PROGRESS NOTES
Multiple Patient Program for Lamprene (clofazimine)    Mr. Diego Gunter is enrolled in the multiple patient program for Lamprene. He presented to clinic today to  his prescription. Two bottle of medication dispensed per [facility] and give to patient and wife with following directions:    Start Lamprene (Clofazimine) 2 tablets (100mg) by mouth once a day with food  Continue Azithromycin 500 mg by mouth once a day  Continue Ethambutol 800 mg by mouth once a day  Continue Arikayce inhalations treatment    Dr. Rao will follow up with patient once results of sputum to discuss if any changes in medications needed. Any questions directed to call ID clinic.

## 2023-07-11 ENCOUNTER — OFFICE VISIT (OUTPATIENT)
Dept: INFECTIOUS DISEASES | Facility: CLINIC | Age: 83
End: 2023-07-11
Payer: MEDICARE

## 2023-07-11 VITALS
HEIGHT: 69 IN | SYSTOLIC BLOOD PRESSURE: 113 MMHG | DIASTOLIC BLOOD PRESSURE: 67 MMHG | TEMPERATURE: 98 F | HEART RATE: 61 BPM | BODY MASS INDEX: 23.51 KG/M2 | WEIGHT: 158.75 LBS

## 2023-07-11 DIAGNOSIS — A31.0 MYCOBACTERIUM AVIUM-INTRACELLULARE COMPLEX: Primary | ICD-10-CM

## 2023-07-11 DIAGNOSIS — A31.0 MAI (MYCOBACTERIUM AVIUM-INTRACELLULARE): ICD-10-CM

## 2023-07-11 DIAGNOSIS — Z16.29: ICD-10-CM

## 2023-07-11 PROCEDURE — 3074F PR MOST RECENT SYSTOLIC BLOOD PRESSURE < 130 MM HG: ICD-10-PCS | Mod: CPTII,S$GLB,, | Performed by: INTERNAL MEDICINE

## 2023-07-11 PROCEDURE — 1126F PR PAIN SEVERITY QUANTIFIED, NO PAIN PRESENT: ICD-10-PCS | Mod: CPTII,S$GLB,, | Performed by: INTERNAL MEDICINE

## 2023-07-11 PROCEDURE — 1157F ADVNC CARE PLAN IN RCRD: CPT | Mod: CPTII,S$GLB,, | Performed by: INTERNAL MEDICINE

## 2023-07-11 PROCEDURE — 3078F DIAST BP <80 MM HG: CPT | Mod: CPTII,S$GLB,, | Performed by: INTERNAL MEDICINE

## 2023-07-11 PROCEDURE — 3288F PR FALLS RISK ASSESSMENT DOCUMENTED: ICD-10-PCS | Mod: CPTII,S$GLB,, | Performed by: INTERNAL MEDICINE

## 2023-07-11 PROCEDURE — 1159F MED LIST DOCD IN RCRD: CPT | Mod: CPTII,S$GLB,, | Performed by: INTERNAL MEDICINE

## 2023-07-11 PROCEDURE — 1159F PR MEDICATION LIST DOCUMENTED IN MEDICAL RECORD: ICD-10-PCS | Mod: CPTII,S$GLB,, | Performed by: INTERNAL MEDICINE

## 2023-07-11 PROCEDURE — 1126F AMNT PAIN NOTED NONE PRSNT: CPT | Mod: CPTII,S$GLB,, | Performed by: INTERNAL MEDICINE

## 2023-07-11 PROCEDURE — 3078F PR MOST RECENT DIASTOLIC BLOOD PRESSURE < 80 MM HG: ICD-10-PCS | Mod: CPTII,S$GLB,, | Performed by: INTERNAL MEDICINE

## 2023-07-11 PROCEDURE — 99215 OFFICE O/P EST HI 40 MIN: CPT | Mod: S$GLB,,, | Performed by: INTERNAL MEDICINE

## 2023-07-11 PROCEDURE — 1157F PR ADVANCE CARE PLAN OR EQUIV PRESENT IN MEDICAL RECORD: ICD-10-PCS | Mod: CPTII,S$GLB,, | Performed by: INTERNAL MEDICINE

## 2023-07-11 PROCEDURE — 99999 PR PBB SHADOW E&M-EST. PATIENT-LVL III: ICD-10-PCS | Mod: PBBFAC,,, | Performed by: INTERNAL MEDICINE

## 2023-07-11 PROCEDURE — 3288F FALL RISK ASSESSMENT DOCD: CPT | Mod: CPTII,S$GLB,, | Performed by: INTERNAL MEDICINE

## 2023-07-11 PROCEDURE — 1101F PR PT FALLS ASSESS DOC 0-1 FALLS W/OUT INJ PAST YR: ICD-10-PCS | Mod: CPTII,S$GLB,, | Performed by: INTERNAL MEDICINE

## 2023-07-11 PROCEDURE — 99999 PR PBB SHADOW E&M-EST. PATIENT-LVL III: CPT | Mod: PBBFAC,,, | Performed by: INTERNAL MEDICINE

## 2023-07-11 PROCEDURE — 99215 PR OFFICE/OUTPT VISIT, EST, LEVL V, 40-54 MIN: ICD-10-PCS | Mod: S$GLB,,, | Performed by: INTERNAL MEDICINE

## 2023-07-11 PROCEDURE — 1101F PT FALLS ASSESS-DOCD LE1/YR: CPT | Mod: CPTII,S$GLB,, | Performed by: INTERNAL MEDICINE

## 2023-07-11 PROCEDURE — 3074F SYST BP LT 130 MM HG: CPT | Mod: CPTII,S$GLB,, | Performed by: INTERNAL MEDICINE

## 2023-07-11 NOTE — PROGRESS NOTES
"INFECTIOUS DISEASE CLINIC  07/11/2023 2:00 PM    Subjective:      Chief Complaint:   Pulmonary MAC follow up     History of Present Illness:    Patient Diego Gunter is a 82 y.o. male  with h/o emphysema and cavitary MAC, recent epidural hematoma, seen in clinic for MAC f/u. Reports compliance with daily azithro/ethambutol/arikacye. Denies side effects. Just picked up clofazamine, hasn't started yet. Awaiting PA appeal for tedezolid and omadacycle (in touch with specialty pharmacy). Asked for today appt because he reported "kidney pain" a few days ago, but went to ED and symptoms resolved. Denies urinary freq/urgency.Compliant with aerobika and inhaled hypertonic saline. Dropping off sputum cultures monthly.     Sputum Cultures persistently positive. Susceptibilities from 3/13/23 culture now concerning for macrolide resistance    Organism     MYCOBACTERIUM AVIUM COMPLEX   Antibiotic                   TED (mcg/mL)  Interpretation   ----------------------------------------------------------   Clofazimine                          0.12   Moxifloxacin                            2       I   Clarithromycin                        >64       R   Amikacin (IV)                          64       R   Amikacin (liposomal, inhaled)          64       S   Linezolid                              32       R       4/17/23  Organism     MYCOBACTERIUM AVIUM COMPLEX   Antibiotic                   TED (mcg/mL)  Interpretation   ----------------------------------------------------------   Clofazimine                          0.12   Moxifloxacin                            2       I   Clarithromycin                        >64       R   Amikacin (IV)                          32       I   Amikacin (liposomal, inhaled)          32       S   Linezolid                              32       R       Last CT 3/2023  Emphysematous changes redemonstrated.  Bronchiectasis, bronchial wall thickening, fibrosis and volume loss in the right upper lobe and " superior segment right lower lobe appears similar to the prior exam.  No new opacities seen.      Organism     MYCOBACTERIUM AVIUM   Antibiotic                   TED (mcg/mL)  Interpretation   ----------------------------------------------------------   Clofazimine                          0.12   Moxifloxacin                            4       R   Clarithromycin                          2       S   Amikacin (IV)                           8       S   Amikacin (liposomal, inhaled)           8       S   Linezolid                              32       R        Takes prilosec for reflux.     Quit smoking 57 y ago.     Had bronch 10/2018 for work up of lung mass     BRONCHIAL WASH CYTOLOGY:  NEGATIVE EXAM-NO NEOPLASIA IDENTIFIED  NO ORGANISMS IDENTIFIED WITH A GMS STAIN  THE CONTROL STAINED APPROPRIATEL          AFB cultures 10/7/21, 10/28/21, 11/18/21, 12/9/21 all positive MAC     CT 11/2021  1. Continued evolution of previously described right upper lobe cavitary lesion measuring approximately 3.5 x 4.3 x 5.7-cm (previously 3.3 x 4.4 x 5.1-cm). Similar to minimal interval decrease in the amount of surrounding wall thickness, consolidation and surrounding scattered small consolidative multifocal airspace opacities.  Mediastinal lymphadenopathy, not significantly changed.  2. Symmetric moderate-severe centrilobular emphysematous changes with basoapical gradient, not significantly changed.      Review of Symptoms:  Constitutional: Denies fevers, chills, or weakness.  ENT: Denies dysphagia, nasal discharge, ear pain or discharge.  Cardiovascular: Denies chest pain, palpitations, orthopnea, or claudication.  Respiratory: Denies shortness of breath, cough, hemoptysis, or wheezing.  GI: Denies nausea/vomitting, hematochezia, melena, abd pain, or changes in appetite.  : Denies dysuria, incontinence, or hematuria.  Musculoskeletal: Denies joint pain or myalgias.  Skin/breast: Denies rashes, lumps, lesions, or  discharge.  Neurologic: Denies headache, dizziness, vertigo, or paresthesias.    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 Dr. Pham    Hx of colonic polyps     followed by GI. Dr. Pham    Hyperlipidemia associated with type 2 diabetes mellitus     Hyperlipidemia LDL goal <100     Hypernatremia 05/05/2022    Hypertension associated with type 2 diabetes mellitus     Hypertension associated with type 2 diabetes mellitus     Hypotension     Intermittent confusion 04/29/2022    Overweight(278.02)     Prostate cancer 09/2011    followed by urology, Dr. Rogers    Pulmonary embolism     Reducible right inguinal hernia 10/10/2022    Right hemiparesis 07/22/2022    Secondary to cervical hematoma    Type II or unspecified type diabetes mellitus without mention of complication, not stated as uncontrolled     diet controlled    Urinary retention 04/29/2022       Past Surgical History:   Procedure Laterality Date    BRONCHOSCOPY N/A 10/15/2018    Procedure: BRONCHOSCOPY;  Surgeon: Pratibha Diagnostic Provider;  Location: [facility] 2ND FLR;  Service: Anesthesiology;  Laterality: N/A;    CARPAL TUNNEL RELEASE Right 1/11/2023    Procedure: RELEASE, CARPAL TUNNEL;  Surgeon: Romero Lazo MD;  Location: [facility];  Service: Orthopedics;  Laterality: Right;    CATARACT EXTRACTION, BILATERAL  2014    COLONOSCOPY N/A 5/16/2023    Procedure: COLONOSCOPY;  Surgeon: Buster Seqeuira MD;  Location: [facility] ENDO (4TH FLR);  Service: Endoscopy;  Laterality: N/A;    DECOMPRESSION, NERVE, ULNAR Right 1/11/2023     Procedure: DECOMPRESSION, NERVE, ULNAR - right cub jolie and guyon's decompression as well as open right carpal tunnel release;  Surgeon: Romero Lazo MD;  Location: [facility];  Service: Orthopedics;  Laterality: Right;    ESOPHAGOGASTRODUODENOSCOPY N/A 2023    Procedure: EGD (ESOPHAGOGASTRODUODENOSCOPY);  Surgeon: Buster Sequeira MD;  Location: [facility] ENDO (4TH FLR);  Service: Endoscopy;  Laterality: N/A;  inst mailed-RB  5/10/23 no answer for precall/mleone lpn    POSTERIOR CERVICAL LAMINECTOMY Bilateral 2022    Procedure: LAMINECTOMY, SPINE, CERVICAL, POSTERIOR APPROACH C3-6;  Surgeon: Carlos Miller MD;  Location: [facility] OR 2ND FLR;  Service: Neurosurgery;  Laterality: Bilateral;    PROSTATECTOMY      REPAIR, HERNIA, INGUINAL, WITHOUT HISTORY OF PRIOR REPAIR, AGE 5 YEARS OR OLDER Right 10/10/2022    Procedure: REPAIR, HERNIA, INGUINAL, WITHOUT HISTORY OF PRIOR REPAIR, AGE 5 YEARS OR OLDER;  Surgeon: Dario Esquivel MD;  Location: [facility] OR 2ND FLR;  Service: General;  Laterality: Right;  open right inguinal hernia repair with mesh       Family History   Problem Relation Age of Onset    Colon cancer Mother     Diabetes Mother     Heart disease Father     Mental illness Sister     Diabetes Sister     Other Brother         polio as a child    Pulmonary fibrosis Brother     Diabetes Brother     Myasthenia gravis Brother     Parkinsonism Brother     Diabetes Brother     Dementia Brother     Lung cancer Brother         smoker    Diabetes Maternal Aunt     Diabetes Other     Esophageal cancer Neg Hx        Social History     Socioeconomic History    Marital status:      Spouse name: Addis    Number of children: 2   Occupational History    Occupation: tool    Tobacco Use    Smoking status: Former     Packs/day: 0.25     Years: 5.00     Pack years: 1.25     Types: Cigarettes     Quit date: 10/2/1962     Years since quittin.8    Smokeless tobacco: Former    Tobacco comments:     quit age 23    Substance and Sexual Activity    Alcohol use: Not Currently    Drug use: No    Sexual activity: Yes     Partners: Female     Social Determinants of Health     Financial Resource Strain: Low Risk     Difficulty of Paying Living Expenses: Not hard at all   Food Insecurity: No Food Insecurity    Worried About Running Out of Food in the Last Year: Never true    Ran Out of Food in the Last Year: Never true   Transportation Needs: No Transportation Needs    Lack of Transportation (Medical): No    Lack of Transportation (Non-Medical): No   Physical Activity: Inactive    Days of Exercise per Week: 0 days    Minutes of Exercise per Session: 10 min   Stress: No Stress Concern Present    Feeling of Stress : Only a little   Social Connections: Moderately Integrated    Frequency of Communication with Friends and Family: Twice a week    Frequency of Social Gatherings with Friends and Family: Once a week    Attends [employer] Services: More than 4 times per year    Active Member of Clubs or Organizations: No    Attends Club or Organization Meetings: Never    Marital Status:    Housing Stability: Low Risk     Unable to Pay for Housing in the Last Year: No    Number of Places Lived in the Last Year: 1    Unstable Housing in the Last Year: No       Review of patient's allergies indicates:  No Known Allergies      Objective:   VS (24h):   Vitals:    07/11/23 0949   BP: 113/67   Pulse: 61   Temp: 98 °F (36.7 °C)     [unfilled]  General: Afebrile, alert, comfortable, no acute distress.   HEENT: TERELL. EOMI, no scleral icterus.   Pulmonary: Non labored,clear to auscultation A/P/L. No wheezing, crackles, or rhonchi.  Cardiac: normal S1 & S2 w/o rubs/murmurs/gallops.   Abdominal: Non-tender, non-distended.  Extremities: Moves all extremities x 4. No peripheral edema.  Skin: No jaundice, rashes, or visible lesions.   Neurological:  Alert and oriented x 4.     Labs:    Glucose   Date Value Ref Range Status   07/07/2023 100 70 -  110 mg/dL Final   03/08/2023 85 70 - 110 mg/dL Final   10/21/2022 106 70 - 110 mg/dL Final       Calcium   Date Value Ref Range Status   07/07/2023 9.6 8.7 - 10.5 mg/dL Final   03/08/2023 9.9 8.7 - 10.5 mg/dL Final   10/21/2022 9.5 8.7 - 10.5 mg/dL Final       Albumin   Date Value Ref Range Status   07/07/2023 4.5 3.5 - 5.2 g/dL Final   03/08/2023 4.1 3.5 - 5.2 g/dL Final   10/21/2022 4.0 3.5 - 5.2 g/dL Final       Total Protein   Date Value Ref Range Status   07/07/2023 7.3 6.0 - 8.4 g/dL Final   03/08/2023 7.4 6.0 - 8.4 g/dL Final   10/21/2022 7.4 6.0 - 8.4 g/dL Final       Sodium   Date Value Ref Range Status   07/07/2023 140 136 - 145 mmol/L Final   03/08/2023 140 136 - 145 mmol/L Final   10/21/2022 142 136 - 145 mmol/L Final       Potassium   Date Value Ref Range Status   07/07/2023 4.5 3.5 - 5.1 mmol/L Final   03/08/2023 4.4 3.5 - 5.1 mmol/L Final   10/21/2022 3.9 3.5 - 5.1 mmol/L Final       CO2   Date Value Ref Range Status   07/07/2023 30 (H) 23 - 29 mmol/L Final   03/08/2023 30 (H) 23 - 29 mmol/L Final   10/21/2022 24 23 - 29 mmol/L Final       Chloride   Date Value Ref Range Status   07/07/2023 105 95 - 110 mmol/L Final   03/08/2023 104 95 - 110 mmol/L Final   10/21/2022 106 95 - 110 mmol/L Final       BUN   Date Value Ref Range Status   07/07/2023 13 8 - 23 mg/dL Final   03/08/2023 18 8 - 23 mg/dL Final   10/21/2022 11 8 - 23 mg/dL Final       Creatinine   Date Value Ref Range Status   07/07/2023 1.0 0.5 - 1.4 mg/dL Final   03/08/2023 1.0 0.5 - 1.4 mg/dL Final   10/21/2022 1.1 0.5 - 1.4 mg/dL Final       Alkaline Phosphatase   Date Value Ref Range Status   07/07/2023 62 55 - 135 U/L Final   03/08/2023 68 55 - 135 U/L Final   10/21/2022 65 55 - 135 U/L Final       ALT   Date Value Ref Range Status   07/07/2023 26 10 - 44 U/L Final   03/08/2023 18 10 - 44 U/L Final   10/21/2022 14 10 - 44 U/L Final       AST   Date Value Ref Range Status   07/07/2023 23 10 - 40 U/L Final   03/08/2023 18 10 - 40 U/L Final    10/21/2022 21 10 - 40 U/L Final       Total Bilirubin   Date Value Ref Range Status   07/07/2023 0.6 0.1 - 1.0 mg/dL Final     Comment:     For infants and newborns, interpretation of results should be based  on gestational age, weight and in agreement with clinical  observations.    Premature Infant recommended reference ranges:  Up to 24 hours.............<8.0 mg/dL  Up to 48 hours............<12.0 mg/dL  3-5 days..................<15.0 mg/dL  6-29 days.................<15.0 mg/dL     03/08/2023 0.5 0.1 - 1.0 mg/dL Final     Comment:     For infants and newborns, interpretation of results should be based  on gestational age, weight and in agreement with clinical  observations.    Premature Infant recommended reference ranges:  Up to 24 hours.............<8.0 mg/dL  Up to 48 hours............<12.0 mg/dL  3-5 days..................<15.0 mg/dL  6-29 days.................<15.0 mg/dL     10/21/2022 0.8 0.1 - 1.0 mg/dL Final     Comment:     For infants and newborns, interpretation of results should be based  on gestational age, weight and in agreement with clinical  observations.    Premature Infant recommended reference ranges:  Up to 24 hours.............<8.0 mg/dL  Up to 48 hours............<12.0 mg/dL  3-5 days..................<15.0 mg/dL  6-29 days.................<15.0 mg/dL         WBC   Date Value Ref Range Status   11/04/2022 6.00 3.90 - 12.70 K/uL Final   10/21/2022 6.65 3.90 - 12.70 K/uL Final   09/19/2022 10.41 3.90 - 12.70 K/uL Final       Hemoglobin   Date Value Ref Range Status   11/04/2022 10.1 (L) 14.0 - 18.0 g/dL Final   10/21/2022 9.7 (L) 14.0 - 18.0 g/dL Final   09/19/2022 13.3 (L) 14.0 - 18.0 g/dL Final       POC Hematocrit   Date Value Ref Range Status   04/28/2022 34 (L) 36 - 54 %PCV Final   04/28/2022 42 36 - 54 %PCV Final     Hematocrit   Date Value Ref Range Status   11/04/2022 31.2 (L) 40.0 - 54.0 % Final   10/21/2022 30.0 (L) 40.0 - 54.0 % Final   09/19/2022 40.3 40.0 - 54.0 % Final        MCV   Date Value Ref Range Status   11/04/2022 86 82 - 98 fL Final   10/21/2022 84 82 - 98 fL Final   09/19/2022 81 (L) 82 - 98 fL Final       Platelets   Date Value Ref Range Status   11/04/2022 340 150 - 450 K/uL Final   10/21/2022 396 150 - 450 K/uL Final   09/19/2022 275 150 - 450 K/uL Final       Lab Results   Component Value Date    CHOL 135 03/08/2023    CHOL 116 (L) 04/28/2022    CHOL 116 (L) 03/07/2022       Lab Results   Component Value Date    HDL 40 03/08/2023    HDL 36 (L) 04/28/2022    HDL 43 03/07/2022       Lab Results   Component Value Date    LDLCALC 70.8 03/08/2023    LDLCALC 62.4 (L) 04/28/2022    LDLCALC 54.4 (L) 03/07/2022       Lab Results   Component Value Date    TRIG 121 03/08/2023    TRIG 88 04/28/2022    TRIG 93 03/07/2022       Lab Results   Component Value Date    CHOLHDL 29.6 03/08/2023    CHOLHDL 31.0 04/28/2022    CHOLHDL 37.1 03/07/2022       RPR   Date Value Ref Range Status   05/08/2022 Non-reactive Non-reactive Final   10/21/2019 Non-reactive Non-reactive Final     No results found for: QUANTIFERON    Medications:  Current Outpatient Medications on File Prior to Visit   Medication Sig Dispense Refill    albuterol (VENTOLIN HFA) 90 mcg/actuation inhaler Inhale 2 puffs into the lungs daily as needed (30 min before arikayce). Rescue 18 g 11    ARIKAYCE 590 mg/8.4 mL NbSp       aspirin (ECOTRIN) 81 MG EC tablet Take 81 mg by mouth once daily.      azithromycin (ZITHROMAX) 500 MG tablet Take 1 tablet (500 mg total) by mouth once daily. 90 tablet 5    b complex vitamins capsule Take 1 capsule by mouth once daily.      ethambutoL (MYAMBUTOL) 400 MG Tab Take 2 tablets (800 mg total) by mouth once daily. 180 tablet 5    INV clofazimine capsule Take 2 capsules (100 mg total) by mouth once daily with meals. Investigational Medication For Investigational use only. Request ID# [id number]. 200 capsule 0    nebulizer and compressor (OMBRA COMPRESSOR SYSTEM) Marcy use to administer nebulized  medication as directed 1 each 0    omadacycline 150 mg Tab Take 300 mg by mouth once daily. 60 tablet 11    pravastatin (PRAVACHOL) 10 MG tablet Take 1 tablet (10 mg total) by mouth once daily. 90 tablet 1    sodium chloride 3% 3 % nebulizer solution USE 4 ML VIAL IN NEBULIZER  TWICE DAILY 480 mL 11    sodium chloride 7% 7 % nebulizer solution use one vial in nebulizer twice a day      tedizolid 200 mg Tab Take 1 tablet (200 mg) by mouth once daily. 30 tablet 11    azelastine (ASTELIN) 137 mcg (0.1 %) nasal spray 2 sprays (274 mcg total) by Nasal route 2 (two) times daily. 30 mL 0    [DISCONTINUED] omega-3 fatty acids 1,000 mg Cap Take 2 g by mouth.       No current facility-administered medications on file prior to visit.       Antibiotics:   Antibiotics (From admission, onward)      None            HIV: No components found for: HIV 1/2 AG/AB  Hepatitis C IgG: No components found for: HEPATITIS C  Syphilis:   RPR   Date Value Ref Range Status   05/08/2022 Non-reactive Non-reactive Final   10/21/2019 Non-reactive Non-reactive Final       Hepatitis A IgG: No components found for: HEPATITIS A IGG  Hepatitis Bc IgG: No components found for: HEPATITIS B CORE IGG  Hepatitis Bs IgG:  Quantiferon: No results found for: QUANTIFERON  VZV IgG: No components found for: VARICELLA IGG    No components found for: SEDIMENTATION RATE  No components found for: C-REACTIVE PROTEIN      Microbiology x 7d:   Microbiology Results (last 7 days)       ** No results found for the last 168 hours. **            Immunization History   Administered Date(s) Administered    COVID-19, MRNA, LN-S, PF (Pfizer) (Purple Cap) 01/13/2021, 04/28/2021, 11/05/2021    Influenza 10/19/2022    Influenza (FLUAD) - Trivalent - Adjuvanted - PF (65+) 09/25/2019    Influenza - High Dose - PF (65 years and older) 09/18/2015, 10/04/2016, 10/23/2017, 09/21/2018    Influenza - Quadrivalent - High Dose - PF (65 years and older) 11/05/2021, 10/18/2022    Influenza -  Quadrivalent - PF *Preferred* (6 months and older) 10/06/2014    Influenza A (H1N1) 2009 Monovalent - IM 01/22/2010    Influenza Split 10/10/2011, 10/06/2014    Pneumococcal Conjugate - 13 Valent 10/28/2015    Pneumococcal Polysaccharide - 23 Valent 07/12/2013    Tdap 03/08/2015    Zoster Recombinant 09/25/2019, 12/30/2019         Reviewed records today as well as relevant labs, cultures, and imaging    Assessment:     Pulmonary MAC, macrolide RESISTANT  Cavitary lung lesion  Nodular liver- noted on CT; US abdomen 10/2021 No compelling sonographic evidence of cirrhosis.  empysema  Antiphospholipid antibody syndrome; h/o coumadin, paused after hematoma     Meets ATS/IDSA guidelines for treatment- cough, cavitary lung lesion, MAC from bronch sample. AFB cultures 10/7/21, 10/28/21, 11/18/21, 12/9/21 all positive MAC. CT 11/2021 with RUL cavitary lesion-3.5 x 4.3 x 5.7-cm     Risk factor-   structural: emphysema, bronchiectasis  functional: reflux        Qtc 407 on 3/8/23      Unfortunately updated cultures now with macrolide resistance (both 3/2023 and 4/2023 cultures). Has clofazamine, arykace and awaiting omadacycline (specialty pharmacy) and tedezolid (main campus pharmacy). Fortunately stable symptoms and imaging    The patient has numerous risk factors for disease progression including male gender, older age,  fibrocavitary disease, macrolide resistance, bronchiectasis. pulm disease from macrolide resistent mac has low cure rate independnetly of these other factors.    Goal of treatment is to prevent progression and not cure; not clear that pt is interested in surgery for removal of pulmonary cavitary lesion    No toxicities from antimicrobials  Extremely medically complex  Patient is high risk for infectious complications given medical comorbidities    Plan:     Stop azithro/ethambutol. Will hold off starting clofazamine and resuming arikayce until another agent available (awaiting omadacycline +/- tedzolid  approval)    Last CT chest 3/8/23- stable    Counseled extensively on antibiotic side effects.      Discussed importance of airway clearance. continue Aerobika and nebulized hypertonic saline bid. Recommended decreasing duration of hot showers, avoid soil and water aerosols, use fans in shower area, avoid fine mist shower heads (heavy droplets better), avoid bubbling water (hot tubs, humidifiers), flush water heater frequently to avoid biofilm buildup     Prevent reflux- avoid stomach sleeping. Famotidine/tums. Stop liquids 3hr before bedtime     Monitor afb sputum monthly     - Will monitor drug therapy for toxicity      Placed referral to [facility] per pt/grandson request    Thank you for your referral. The patient will be contacted for scheduling and you will receive a notification once the request has been processed.    Please forward your patient's medical records to us as soon as possible so that we may route them appropriately. You may fax them to us at [phone] or send them via postal service to:    [facility] Information Management  [address] [place] [address], [id number] [place] [address]    If you have questions or would like to speak with someone, you can contact the Physician Line by phone at [phone] from 8 a.m. to 5 p.m. Mountain Time Monday through Friday.            I have sent communication to the referring physician and/or primary care provider.     I spent a total of 45 minutes on the day of the visit. This includes face to face time and non-face to face time preparing to see the patient (eg, review of tests), obtaining and/or reviewing separately obtained history, documenting clinical information in the electronic or other health record, independently interpreting results, and communicating results to the patient/family/caregiver, or care coordination.      Paola Rao MD, MPH  Infectious Disease

## 2023-07-12 NOTE — TELEPHONE ENCOUNTER
Janeen Rao MD  Caller: Unspecified (6 days ago, 10:29 AM)  I am sending this link to my PA pharmacist to see if she can appeal it.           Previous Messages       ----- Message -----   From: Paola Rao MD   Sent: 7/12/2023  12:16 PM CDT   To: Janeen Marin, ESTER ADVIDSON, *     Here are some citations. Janeen, can you submit appeal? thanks     J Antimicrob Chemother   . 2017 Sep 1;72(suppl_2):i30-i35. doi: 10.1093/rocael/cyz601.   Tedizolid is highly bactericidal in the treatment of pulmonary Mycobacterium avium complex disease   [employer] Journal Cover   Marlene Chandra 1, Jc Menendez 1, Shane Germain 1, Rekha Jameson 1, Damian Huynh 1   Affiliations expand   PMID: [id number] DOI: 10.1093/rocael/ulv306         Is there a role for tedizolid in the treatment of non-tuberculous mycobacterial disease?   Blake Bond, Clary Ornelas, Dimple Lu, Ebonie Tolentino, Heiman F L Wertheim, Denia Lees   Journal of Antimicrobial Chemotherapy, Volume 75, Issue 3, March 2020, Pages 601-000, https://doi.org/10.1093/rocael/vyw779       Comparative study of in vitro activity of tedizolid and linezolid against Mycobacterium avium complex.   Author links open overlay Patricia Brock 1 2, Amanda Barone 3, Tony Hernandez 1 2, Flex Lowe 1 2   Show more   Add to Mendeley   Share   Cite   https://doi.org/10.1016/j.jgar.2022.07.012       In vitro assessment of 17 antimicrobial agents against clinical Mycobacterium avium complex isolates   Daniela Dale, Shirley Patel, Victor M Douglas, Joya Tom, Jena Casas, Ritesh Kendall, Dianne Castro, Ivanna Casas & Ammy Tom   BMC Microbiology volume 22, Article number: 175 (2022) Cite this article         LANA Marie, [place], SHE Rebollar. et al. Contemporary Pharmacotherapies for Nontuberculosis Mycobacterial Infections: A Narrative Review. Infect Dis Ther 12, 343-365 (2023).  "https://doi.org/10.1007/[email or web address]           ----- Message -----   From: ESTER DAVIDSON   Sent: 7/12/2023  11:09 AM CDT   To: Paola Rao MD     Will they not accept the in vitro articles. I'm not seeing anything yet.         ----- Message -----   From: Paola Rao MD   Sent: 7/12/2023   9:49 AM CDT   To: ESTER DAVIDSON.     Can you help me with this? I'm running out of options. Most of literature is in vitro and expert opinion  from my understanding     ----- Message -----   From: Kristi Morse MA   Sent: 7/11/2023   2:59 PM CDT   To: Paola Rao MD, Natalia Estrada MA       ----- Message -----   From: Janeen Marin   Sent: 7/11/2023   2:52 PM CDT   To: Kristi Morse MA     In order for the [facility] to submit an appeal we will need at least 2 journal article that support the use of sivextro for the treatment of MAC. This is the same issues we ran into with the last patient that was prescribed this medication for MAC since it is not FDA approved for this diagnosis.   ----- Message -----   From: Kristi Morse MA   Sent: 7/10/2023  11:23 AM CDT   To: Janeen Hannah! See providers message below.   ----- Message -----   From: Paola Rao MD   Sent: 7/10/2023  12:05 AM CDT   To: Kristi Morse MA     Can you let the patient know and look into how we can appeal. Was the [facility] pharmacy able to do PA and do appeal for us?     ----- Message -----   From: Kristi Morse MA   Sent: 7/6/2023  10:55 AM CDT   To: Paola Rao MD       ----- Message -----   From: Janeen Marin   Sent: 7/6/2023  10:34 AM CDT   To: Markwell Poppy Staff     ----- Message from Janeen Marin sent at 7/6/2023 10:34 AM CDT -----   Hello,       The prior authorization for Diego Gunter's SIVEXTRO 200 MG TABLETS prescription has been DENIED for the following reason(s): Medicare allows us to cover a drug only when it is a Part D drug. A Part D drug is one that is used for a "medically accepted " "indication." A medically accepted indication means the use is   approved by the Food and Drug Administration (FDA) OR the use is supported by one of the   following accepted references:   (1) [facility] Formulary Service Drug Information.   (2) [employer] DRUGDEX Information System.   Sivextro Tab 200mg is not FDA approved for your medical condition(s): MYCOBACTERIUM   AVIUM COMPLEX. These condition(s) are not supported by one of the accepted references.   Therefore your drug is denied because it is not being used for a "medically accepted   indication."   Reviewed by: Alexander corbin       We were unable to reach patient on 7/6/2023.  A voicemail was left for the patient of the prior authorization status along with an appropriate pharmacy phone number should he have questions.     How Do I Request an Appeal?   For an Expedited Appeal: You, your prescriber, or your representative can file an appeal by   telephone, by fax, through the plan's website, or by mail. A verbal request by telephone is the   fastest way to file an expedited (fast) request.   Phone: [phone]   TTY: 711   Fax: [phone]     If there are any additional questions or concerns, please contact me.     Thanks,   Janeen Marin   Pharmacy Technician   [facility]   Phone: [phone]   Fax: [phone]    "

## 2023-07-13 ENCOUNTER — TELEPHONE (OUTPATIENT)
Dept: INFECTIOUS DISEASES | Facility: CLINIC | Age: 83
End: 2023-07-13
Payer: MEDICARE

## 2023-07-13 NOTE — TELEPHONE ENCOUNTER
Returned call to pharmacy- no answer, left voicemail    Spoke with Addis (pt's wife) about nuzyra approval and updated culture results- macrolide resistance. Stop  azithromycin    ----- Message from Natalia Estrada MA sent at 7/13/2023 10:22 AM CDT -----  Regarding: FW: Refill  Contact: [facility] [phone]  [facility]  ----- Message -----  From: Simin Parra  Sent: 7/13/2023  10:04 AM CDT  To: Maira Guevara Staff  Subject: Refill                                           [facility]/ [facility] is calling stating Rx nuzyra 150 mg has been approved for rest the of the year please call

## 2023-07-13 NOTE — TELEPHONE ENCOUNTER
Returned call to Addis, no answer. Spoke with Jayesh. Gave him info on applying for patient assistance programs for medications. Pause antibiotics until we have another antibiotic to add to regimen; clofazamine and arikyace is not an adequate regimen for macrolide resistant mac cavitary. Waiting on approval of another agent     ----- Message from Kingston Jarrell PharmD sent at 7/13/2023  2:24 PM CDT -----  Regarding: Nuayo  Good afternoon Dr. Rao,     This patient's Nuzyra has been approved on his insurance plan but the co-pay is over $800 for a 30 day supply of Nuzyra.  His wife was informed of this when she called into our pharmacy for a status check.  Unfortunately, we have looked into options to assist the patient with the co-pay and there are no options at this time.  His wife stated that this was unaffordable for them and that they will be contacting your office for how to proceed.  Please let us know if we can assist any further.      Thanks,   Kingston Jarrell, PharmD  [facility]  [phone]

## 2023-07-24 ENCOUNTER — HOSPITAL ENCOUNTER (OUTPATIENT)
Dept: CARDIOLOGY | Facility: HOSPITAL | Age: 83
Discharge: HOME | End: 2023-07-24
Attending: INTERNAL MEDICINE
Payer: MEDICARE

## 2023-07-24 DIAGNOSIS — A31.0 MYCOBACTERIUM AVIUM-INTRACELLULARE COMPLEX: ICD-10-CM

## 2023-07-24 PROCEDURE — 93010 EKG 12-LEAD: ICD-10-PCS | Mod: ,,, | Performed by: INTERNAL MEDICINE

## 2023-07-24 PROCEDURE — 93005 ELECTROCARDIOGRAM TRACING: CPT

## 2023-07-24 PROCEDURE — 93010 ELECTROCARDIOGRAM REPORT: CPT | Mod: ,,, | Performed by: INTERNAL MEDICINE

## 2023-07-31 NOTE — TELEPHONE ENCOUNTER
Is This A New Presentation, Or A Follow-Up?: Rash Spoke with pt., appt. Scheduled for fasting labs on 2/26/2020.   How Severe Is Your Rash?: mild What Type Of Note Output Would You Prefer (Optional)?: Standard Output

## 2023-08-01 ENCOUNTER — TELEPHONE (OUTPATIENT)
Dept: HEMATOLOGY/ONCOLOGY | Facility: CLINIC | Age: 83
End: 2023-08-01
Payer: MEDICARE

## 2023-08-07 ENCOUNTER — LAB VISIT (OUTPATIENT)
Dept: LAB | Facility: HOSPITAL | Age: 83
End: 2023-08-07
Attending: INTERNAL MEDICINE
Payer: MEDICARE

## 2023-08-07 DIAGNOSIS — A31.0 MYCOBACTERIUM AVIUM-INTRACELLULARE COMPLEX: ICD-10-CM

## 2023-08-07 PROCEDURE — 87118 MYCOBACTERIC IDENTIFICATION: CPT

## 2023-08-07 PROCEDURE — 87186 SC STD MICRODIL/AGAR DIL: CPT | Performed by: INTERNAL MEDICINE

## 2023-08-07 PROCEDURE — 87118 MYCOBACTERIC IDENTIFICATION: CPT | Performed by: INTERNAL MEDICINE

## 2023-08-07 PROCEDURE — 87150 DNA/RNA AMPLIFIED PROBE: CPT

## 2023-08-07 PROCEDURE — 87118 MYCOBACTERIC IDENTIFICATION: CPT | Mod: 59 | Performed by: INTERNAL MEDICINE

## 2023-08-07 PROCEDURE — 87015 SPECIMEN INFECT AGNT CONCNTJ: CPT | Performed by: INTERNAL MEDICINE

## 2023-08-07 PROCEDURE — 87116 MYCOBACTERIA CULTURE: CPT | Performed by: INTERNAL MEDICINE

## 2023-08-07 PROCEDURE — 87206 SMEAR FLUORESCENT/ACID STAI: CPT | Performed by: INTERNAL MEDICINE

## 2023-08-10 LAB
ACID FAST MOD KINY STN SPEC: ABNORMAL
MYCOBACTERIUM SPEC QL CULT: ABNORMAL

## 2023-08-16 ENCOUNTER — TELEPHONE (OUTPATIENT)
Dept: INFECTIOUS DISEASES | Facility: CLINIC | Age: 83
End: 2023-08-16
Payer: MEDICARE

## 2023-08-16 NOTE — TELEPHONE ENCOUNTER
Called Pt's grandson, Jayesh Gunter, to follow up on questions that he had for Dr. Rao concerning her recommendations for contact info for financial assistance/advising and prior authorization work with pharmacy. Grandson did not answer and voicemail was left to give ID contact info if they wanted to call back as well as [facility] financial counselor phone number.

## 2023-08-21 ENCOUNTER — TELEPHONE (OUTPATIENT)
Dept: INFECTIOUS DISEASES | Facility: HOSPITAL | Age: 83
End: 2023-08-21
Payer: MEDICARE

## 2023-08-21 ENCOUNTER — TELEPHONE (OUTPATIENT)
Dept: INFECTIOUS DISEASES | Facility: CLINIC | Age: 83
End: 2023-08-21
Payer: MEDICARE

## 2023-08-21 NOTE — TELEPHONE ENCOUNTER
----- Message from Dee Dee Cuellar MA sent at 8/21/2023  1:15 PM CDT -----  I called him on 08/16 and again today and there was no reply for either call but I left the  phone # as well as ours. I have not received any further communication.   ----- Message -----  From: Paola Rao MD  Sent: 8/18/2023   2:21 PM CDT  To: Dee Dee Cuellar MA    This is the patient who's grandson called. Were you able to get ahold of him and give him resources to talk to financial counseler    ----- Message -----  From: Amos, [facility]  Sent: 4/18/2023   2:10 PM CDT  To: Paola Rao MD

## 2023-08-21 NOTE — TELEPHONE ENCOUNTER
----- Message from Dee Dee Cuellar MA sent at 8/21/2023  2:35 PM CDT -----  I am resending my message because I'm not sure if it went through:   I called the Jayesh on 08/16 and again today and left a voicemail both times with the  phone # and our phone number. I haven't heard anything back from him.   ----- Message -----  From: Paola Rao MD  Sent: 8/18/2023   2:21 PM CDT  To: Dee Dee Cuellar MA    This is the patient who's grandson called. Were you able to get ahold of him and give him resources to talk to financial counseler    ----- Message -----  From: Amos [facility]  Sent: 4/18/2023   2:10 PM CDT  To: Paola Rao MD

## 2023-08-21 NOTE — TELEPHONE ENCOUNTER
Called again on 08/21/2023 and no one answered; I left the same voicemail, communicating the financial advising # for [facility] as well as our ID number.

## 2023-08-24 ENCOUNTER — TELEPHONE (OUTPATIENT)
Dept: INFECTIOUS DISEASES | Facility: CLINIC | Age: 83
End: 2023-08-24
Payer: MEDICARE

## 2023-08-24 NOTE — TELEPHONE ENCOUNTER
----- Message from Paola Rao MD sent at 8/24/2023  2:43 PM CDT -----  This patient is in a difficult situation because insurance is denying medications for pulmonary MAC and because of extensive drug resistance we don't have many options. Can you please reach out to the patient with where he can get help with medication cost. Ms kamara had found a few programs for these patients in past, but not sure if he's eligible. Thank you    ----- Message -----  From: Kristi Morse MA  Sent: 8/23/2023   2:33 PM CDT  To: Paola Rao MD    Micro lab called with positive AFB cultures for this pt.

## 2023-08-24 NOTE — TELEPHONE ENCOUNTER
Called pt, spoke to pt's wife  Gave her the number for [facility]- [phone]  Stated she called already, and was approved for only $2000 per year. The amount that he was approve for would not be enough to cover his medication.

## 2023-08-29 LAB
ACID FAST MOD KINY STN SPEC: NORMAL
MYCOBACTERIUM SPEC QL CULT: NORMAL

## 2023-08-30 ENCOUNTER — TELEPHONE (OUTPATIENT)
Dept: FAMILY MEDICINE | Facility: CLINIC | Age: 83
End: 2023-08-30
Payer: MEDICARE

## 2023-08-30 NOTE — TELEPHONE ENCOUNTER
"Spoke to patient in reference to his stomach pains says he noticed it after he went out with his friends for lunch. Questioned patient what did he eat? Patient stated "a hamburger". Patient questioned when was his last bowel movement, & was it a good one? Patient stated "yesterday", & yes it was". Patient questioned on a scale of 1-10, 10 being the worst how would he rate his pain? Patient stated that it's subsiding now #2. Patient informed to contact the clinic again if he has any further stomach pain that does not subside.   "

## 2023-08-30 NOTE — TELEPHONE ENCOUNTER
----- Message from Trena Raman sent at 8/30/2023  1:06 PM CDT -----  Regarding: Addis wife [phone]  Type:  Sooner Appointment Request    Patient is requesting a sooner appointment.  Patient declined first available appointment listed as well as another facility and provider .  Patient will not accept being placed on the waitlist and is requesting a message be sent to doctor.    Name of Caller: Self  When is the first available appointment? N/a    Symptoms: stomach pain    Would the patient rather a call back or a response via [facility]? Call back     Best Call Back Number: [phone]

## 2023-09-05 ENCOUNTER — TELEPHONE (OUTPATIENT)
Dept: GASTROENTEROLOGY | Facility: CLINIC | Age: 83
End: 2023-09-05
Payer: MEDICARE

## 2023-09-05 ENCOUNTER — HOSPITAL ENCOUNTER (OUTPATIENT)
Dept: RADIOLOGY | Facility: HOSPITAL | Age: 83
Discharge: HOME | End: 2023-09-05
Attending: INTERNAL MEDICINE
Payer: MEDICARE

## 2023-09-05 DIAGNOSIS — A31.0 MYCOBACTERIUM AVIUM-INTRACELLULARE COMPLEX: ICD-10-CM

## 2023-09-05 PROCEDURE — 71250 CT THORAX DX C-: CPT | Mod: TC

## 2023-09-05 PROCEDURE — 71250 CT CHEST WITHOUT CONTRAST: ICD-10-PCS | Mod: 26,,, | Performed by: RADIOLOGY

## 2023-09-05 PROCEDURE — 71250 CT THORAX DX C-: CPT | Mod: 26,,, | Performed by: RADIOLOGY

## 2023-09-05 NOTE — TELEPHONE ENCOUNTER
Attempted to contact patient.  A voice message was left if patient wants to schedule a follow up appointment with any provider in clinic.

## 2023-09-05 NOTE — TELEPHONE ENCOUNTER
----- Message from Charmaine Whitt MD sent at 9/1/2023  3:05 PM CDT -----  Hi I haven't seen him in 10 months. Can he be placed in a 7 day hold with me or any available provider?  ----- Message -----  From: Stephanie Paul MA  Sent: 8/30/2023   1:33 PM CDT  To: MD Camden Bond Dr. Ali    Mrs. Mancini called.  She states that Mr. Cortez is having abdominal pain.  I asked her how severe it was.  She said it is not severe but she does not know what to do.  He has abdominal pain below his rib cage from side to side of his abdomen.  Wife stated that Dr. Guevara took him off all his medications due to a mac infection.    Please advise      Thank You

## 2023-09-06 ENCOUNTER — OFFICE VISIT (OUTPATIENT)
Dept: FAMILY MEDICINE | Facility: CLINIC | Age: 83
End: 2023-09-06
Payer: MEDICARE

## 2023-09-06 VITALS
SYSTOLIC BLOOD PRESSURE: 110 MMHG | HEIGHT: 69 IN | OXYGEN SATURATION: 99 % | DIASTOLIC BLOOD PRESSURE: 68 MMHG | BODY MASS INDEX: 23.18 KG/M2 | TEMPERATURE: 98 F | HEART RATE: 70 BPM | WEIGHT: 156.5 LBS

## 2023-09-06 DIAGNOSIS — I70.0 ATHEROSCLEROSIS OF AORTA: ICD-10-CM

## 2023-09-06 DIAGNOSIS — Z74.09 IMPAIRED MOBILITY: ICD-10-CM

## 2023-09-06 DIAGNOSIS — R73.03 PREDIABETES: ICD-10-CM

## 2023-09-06 DIAGNOSIS — A31.0 MAI (MYCOBACTERIUM AVIUM-INTRACELLULARE): ICD-10-CM

## 2023-09-06 DIAGNOSIS — J43.2 CENTRILOBULAR EMPHYSEMA: ICD-10-CM

## 2023-09-06 DIAGNOSIS — E78.5 HYPERLIPIDEMIA LDL GOAL <100: Primary | ICD-10-CM

## 2023-09-06 PROBLEM — G82.50 QUADRIPLEGIA, UNSPECIFIED: Status: RESOLVED | Noted: 2023-09-06 | Resolved: 2023-09-06

## 2023-09-06 PROBLEM — G82.50 QUADRIPLEGIA, UNSPECIFIED: Status: ACTIVE | Noted: 2023-09-06

## 2023-09-06 PROCEDURE — 3078F PR MOST RECENT DIASTOLIC BLOOD PRESSURE < 80 MM HG: ICD-10-PCS | Mod: CPTII,S$GLB,, | Performed by: FAMILY MEDICINE

## 2023-09-06 PROCEDURE — 3288F PR FALLS RISK ASSESSMENT DOCUMENTED: ICD-10-PCS | Mod: CPTII,S$GLB,, | Performed by: FAMILY MEDICINE

## 2023-09-06 PROCEDURE — 3288F FALL RISK ASSESSMENT DOCD: CPT | Mod: CPTII,S$GLB,, | Performed by: FAMILY MEDICINE

## 2023-09-06 PROCEDURE — 3078F DIAST BP <80 MM HG: CPT | Mod: CPTII,S$GLB,, | Performed by: FAMILY MEDICINE

## 2023-09-06 PROCEDURE — 1159F MED LIST DOCD IN RCRD: CPT | Mod: CPTII,S$GLB,, | Performed by: FAMILY MEDICINE

## 2023-09-06 PROCEDURE — 3074F PR MOST RECENT SYSTOLIC BLOOD PRESSURE < 130 MM HG: ICD-10-PCS | Mod: CPTII,S$GLB,, | Performed by: FAMILY MEDICINE

## 2023-09-06 PROCEDURE — 1157F ADVNC CARE PLAN IN RCRD: CPT | Mod: CPTII,S$GLB,, | Performed by: FAMILY MEDICINE

## 2023-09-06 PROCEDURE — 1101F PR PT FALLS ASSESS DOC 0-1 FALLS W/OUT INJ PAST YR: ICD-10-PCS | Mod: CPTII,S$GLB,, | Performed by: FAMILY MEDICINE

## 2023-09-06 PROCEDURE — 1160F PR REVIEW ALL MEDS BY PRESCRIBER/CLIN PHARMACIST DOCUMENTED: ICD-10-PCS | Mod: CPTII,S$GLB,, | Performed by: FAMILY MEDICINE

## 2023-09-06 PROCEDURE — 1157F PR ADVANCE CARE PLAN OR EQUIV PRESENT IN MEDICAL RECORD: ICD-10-PCS | Mod: CPTII,S$GLB,, | Performed by: FAMILY MEDICINE

## 2023-09-06 PROCEDURE — 99214 PR OFFICE/OUTPT VISIT, EST, LEVL IV, 30-39 MIN: ICD-10-PCS | Mod: S$GLB,,, | Performed by: FAMILY MEDICINE

## 2023-09-06 PROCEDURE — 1126F PR PAIN SEVERITY QUANTIFIED, NO PAIN PRESENT: ICD-10-PCS | Mod: CPTII,S$GLB,, | Performed by: FAMILY MEDICINE

## 2023-09-06 PROCEDURE — 1159F PR MEDICATION LIST DOCUMENTED IN MEDICAL RECORD: ICD-10-PCS | Mod: CPTII,S$GLB,, | Performed by: FAMILY MEDICINE

## 2023-09-06 PROCEDURE — 99999 PR PBB SHADOW E&M-EST. PATIENT-LVL III: CPT | Mod: PBBFAC,,, | Performed by: FAMILY MEDICINE

## 2023-09-06 PROCEDURE — 1126F AMNT PAIN NOTED NONE PRSNT: CPT | Mod: CPTII,S$GLB,, | Performed by: FAMILY MEDICINE

## 2023-09-06 PROCEDURE — 1160F RVW MEDS BY RX/DR IN RCRD: CPT | Mod: CPTII,S$GLB,, | Performed by: FAMILY MEDICINE

## 2023-09-06 PROCEDURE — 99999 PR PBB SHADOW E&M-EST. PATIENT-LVL III: ICD-10-PCS | Mod: PBBFAC,,, | Performed by: FAMILY MEDICINE

## 2023-09-06 PROCEDURE — 1101F PT FALLS ASSESS-DOCD LE1/YR: CPT | Mod: CPTII,S$GLB,, | Performed by: FAMILY MEDICINE

## 2023-09-06 PROCEDURE — 3074F SYST BP LT 130 MM HG: CPT | Mod: CPTII,S$GLB,, | Performed by: FAMILY MEDICINE

## 2023-09-06 PROCEDURE — 99214 OFFICE O/P EST MOD 30 MIN: CPT | Mod: S$GLB,,, | Performed by: FAMILY MEDICINE

## 2023-09-06 RX ORDER — PRAVASTATIN SODIUM 10 MG/1
10 TABLET ORAL NIGHTLY
Qty: 90 TABLET | Refills: 1 | Status: SHIPPED | OUTPATIENT
Start: 2023-09-06 | End: 2024-01-10 | Stop reason: SDUPTHER

## 2023-09-06 NOTE — PROGRESS NOTES
"  Physical Exam  /68   Pulse 70   Temp 97.8 °F (36.6 °C) (Oral)   Ht 5' 9" (1.753 m)   Wt 71 kg (156 lb 8.4 oz)   SpO2 99%   BMI 23.11 kg/m²      Office Visit    Patient Name: Digeo Gunter    : 1940  MRN: [id number]      Assessment/Plan:  Diego Gunter is a 82 y.o. male who presents today for :    Hyperlipidemia LDL goal <100  -     pravastatin (PRAVACHOL) 10 MG tablet; Take 1 tablet (10 mg total) by mouth every evening.  Dispense: 90 tablet; Refill: 1  Atherosclerosis of aorta  Prediabetes  -continue statin   -diet/exercise    HILLARY (mycobacterium avium-intracellulare)  -management per ID    Centrilobular emphysema  -chronic and stable, asymptomatic, continue to monitor    Impaired mobility  Fall precautions advised            Follow up PRN    This note was created by combination of typed  and MModal dictation.  Transcription errors may be present.  If there are any questions, please contact me.      ----------------------------------------------------------------------------------------------------------------------      HPI:  Patient Care Team:  Portia Ferreira MD as PCP - General (Internal Medicine)  Dustin Mccarthy III, MD as Consulting Physician (Orthopedic Surgery)  Pierre Rogers MD as Consulting Physician (Urology)  Burak Gurrola MD as Consulting Physician (Pulmonary Disease)  Theo Alvares MD as Consulting Physician (Infectious Diseases)  Suzan Glass LPN as Licensed Practical Nurse  Gilberto Silva OD as Consulting Physician (Optometry)  Thien Joy MD (Inactive) as Consulting Physician (Gastroenterology)  Yuan Lundberg MD as Consulting Physician (Cardiology)  Neel Jimenez MD as Consulting Physician (Neurology)  Paola Rao MD as Consulting Physician (Infectious Diseases)  Fiorella Garcia NP as Nurse Practitioner (Urology)  Carlos Miller MD as Consulting Physician (Neurosurgery)  Ayesha Hall MD as " Consulting Physician (Otolaryngology)  Deepa Toussaint FNP-C (Family Medicine)    Diego is a 82 y.o. male with      Patient Active Problem List   Diagnosis    History of prostate cancer    Prediabetes    Allergic rhinitis    Anxiety    Atherosclerosis of aorta    Centrilobular emphysema    Cavitary lung disease    HILLARY (mycobacterium avium-intracellulare)    Mild cognitive impairment    Cognitive communication deficit    History of pulmonary embolism    Bradycardia    Mild pulmonary hypertension    Hypercoagulable state    Positive cardiolipin antibodies    Antiphospholipid syndrome    Epidural hematoma    Impaired mobility    Other hyperlipidemia    Status post laminectomy with spinal fusion    Cholelithiasis    Anemia    Hard of hearing    Stiffness of right upper arm joint    Decreased functional activity tolerance    Decreased strength of upper extremity    Decreased range of motion of right shoulder    Right shoulder pain     This patient is new to me       Diego presents today with his spouse for:   follow up - cholesterol medication refills    His last Annual checkup with PCP was about 6 months ago. He has been under the care of ID for management of cavitary MAC that was previously treated with azithro/ethambutol. He was recently taken off of Arikayce and switched to Tedezolid/Omadacycle - but is currently waiting for insurance approval due to cost. He is otherwise doing well and has no acute concerns today. He does need his statin refilled. Patient is ADL independent and denies any recent falls.       Additional ROS      CONST: no fever, +activity change  EYES: no vision change.   ENT: no sore throat. No dysphagia.   CV: no CP with exertion  RESP: no SOB  GI: no N/V/diarrhea/constipation  : no urinary concerns  MSK: no new myalgias or arthralgias.   SKIN: no new rashes  NEURO: no focal deficits.   PSYCH: no new issues.   ENDOCRINE: no polyuria.             Patient Active Problem List   Diagnosis    History  of prostate cancer    Prediabetes    Allergic rhinitis    Anxiety    Atherosclerosis of aorta    Centrilobular emphysema    Cavitary lung disease    HILLARY (mycobacterium avium-intracellulare)    Mild cognitive impairment    Cognitive communication deficit    History of pulmonary embolism    Bradycardia    Mild pulmonary hypertension    Hypercoagulable state    Positive cardiolipin antibodies    Antiphospholipid syndrome    Epidural hematoma    Impaired mobility    Other hyperlipidemia    Status post laminectomy with spinal fusion    Cholelithiasis    Anemia    Hard of hearing    Stiffness of right upper arm joint    Decreased functional activity tolerance    Decreased strength of upper extremity    Decreased range of motion of right shoulder    Right shoulder pain       Current Medications  Medications reviewed/updated.     Current Outpatient Medications on File Prior to Visit   Medication Sig Dispense Refill    ARIKAYCE 590 mg/8.4 mL NbSp       aspirin (ECOTRIN) 81 MG EC tablet Take 81 mg by mouth once daily.      azelastine (ASTELIN) 137 mcg (0.1 %) nasal spray 2 sprays (274 mcg total) by Nasal route 2 (two) times daily. 30 mL 0    b complex vitamins capsule Take 1 capsule by mouth once daily.      INV clofazimine capsule Take 2 capsules (100 mg total) by mouth once daily with meals. Investigational Medication For Investigational use only. Request ID# [id number]. 200 capsule 0    nebulizer and compressor ([facility] COMPRESSOR SYSTEM) Marcy use to administer nebulized medication as directed 1 each 0    sodium chloride 3% 3 % nebulizer solution USE 4 ML VIAL IN NEBULIZER  TWICE DAILY 480 mL 11    sodium chloride 7% 7 % nebulizer solution use one vial in nebulizer twice a day      [DISCONTINUED] pravastatin (PRAVACHOL) 10 MG tablet Take 1 tablet (10 mg total) by mouth once daily. 90 tablet 1    albuterol (VENTOLIN HFA) 90 mcg/actuation inhaler Inhale 2 puffs into the lungs daily as needed (30 min before arikayce). Rescue 18 g  11    [DISCONTINUED] omega-3 fatty acids 1,000 mg Cap Take 2 g by mouth.       No current facility-administered medications on file prior to visit.           Past Surgical History:   Procedure Laterality Date    BRONCHOSCOPY N/A 10/15/2018    Procedure: BRONCHOSCOPY;  Surgeon: [facility] Diagnostic Provider;  [facility];  Service: Anesthesiology;  Laterality: N/A;    CARPAL TUNNEL RELEASE Right 1/11/2023    Procedure: RELEASE, CARPAL TUNNEL;  Surgeon: Romero Lazo MD;  Location: [facility] OR;  Service: Orthopedics;  Laterality: Right;    CATARACT EXTRACTION, BILATERAL  2014    COLONOSCOPY N/A 5/16/2023    Procedure: COLONOSCOPY;  Surgeon: Buster Sequeira MD;  Location: [facility] (4TH FLR);  Service: Endoscopy;  Laterality: N/A;    DECOMPRESSION, NERVE, ULNAR Right 1/11/2023    Procedure: DECOMPRESSION, NERVE, ULNAR - right cub jolie and guyon's decompression as well as open right carpal tunnel release;  Surgeon: Romero Lazo MD;  Location: [facility] OR;  Service: Orthopedics;  Laterality: Right;    ESOPHAGOGASTRODUODENOSCOPY N/A 5/16/2023    Procedure: EGD (ESOPHAGOGASTRODUODENOSCOPY);  Surgeon: Buster Sequeira MD;  Location: [facility] (4TH FLR);  Service: Endoscopy;  Laterality: N/A;  inst mailed-RB  5/10/23 no answer for precall/mleone lpn    POSTERIOR CERVICAL LAMINECTOMY Bilateral 4/28/2022    Procedure: LAMINECTOMY, SPINE, CERVICAL, POSTERIOR APPROACH C3-6;  Surgeon: Carlos Miller MD;  Location: [facility] OR [address] [place] [address];  Service: Neurosurgery;  Laterality: Bilateral;    PROSTATECTOMY      REPAIR, HERNIA, INGUINAL, WITHOUT HISTORY OF PRIOR REPAIR, AGE 5 YEARS OR OLDER Right 10/10/2022    Procedure: REPAIR, HERNIA, INGUINAL, WITHOUT HISTORY OF PRIOR REPAIR, AGE 5 YEARS OR OLDER;  Surgeon: Dario Esquivel MD;  Location: [address];  Service: General;  Laterality: Right;  open right inguinal hernia repair with mesh       Family History   Problem Relation Age of Onset    Colon cancer Mother     Diabetes  Mother     Heart disease Father     Mental illness Sister     Diabetes Sister     Other Brother         polio as a child    Pulmonary fibrosis Brother     Diabetes Brother     Myasthenia gravis Brother     Parkinsonism Brother     Diabetes Brother     Dementia Brother     Lung cancer Brother         smoker    Diabetes Maternal Aunt     Diabetes Other     Esophageal cancer Neg Hx        Social History     Socioeconomic History    Marital status:      Spouse name: Addis    Number of children: 2   Occupational History    Occupation: tool    Tobacco Use    Smoking status: Former     Current packs/day: 0.00     Average packs/day: 0.3 packs/day for 5.0 years (1.3 ttl pk-yrs)     Types: Cigarettes     Start date: 10/2/1957     Quit date: 10/2/1962     Years since quittin.9    Smokeless tobacco: Former    Tobacco comments:     quit age 23   Substance and Sexual Activity    Alcohol use: Not Currently    Drug use: No    Sexual activity: Yes     Partners: Female     Social Determinants of Health     Financial Resource Strain: Low Risk  (3/30/2023)    Overall Financial Resource Strain (CARDIA)     Difficulty of Paying Living Expenses: Not hard at all   Food Insecurity: No Food Insecurity (3/30/2023)    Hunger Vital Sign     Worried About Running Out of Food in the Last Year: Never true     Ran Out of Food in the Last Year: Never true   Transportation Needs: No Transportation Needs (3/30/2023)    PRAPARE - Transportation     Lack of Transportation (Medical): No     Lack of Transportation (Non-Medical): No   Physical Activity: Inactive (3/30/2023)    Exercise Vital Sign     Days of Exercise per Week: 0 days     Minutes of Exercise per Session: 10 min   Stress: No Stress Concern Present (3/30/2023)    [employer] - Occupational Stress Questionnaire     Feeling of Stress : Only a little   Social Connections: Moderately Integrated (3/30/2023)    Social Connection and Isolation  "Panel [NHANES]     Frequency of Communication with Friends and Family: Twice a week     Frequency of Social Gatherings with Friends and Family: Once a week     Attends Shinto Services: More than 4 times per year     Active Member of Clubs or Organizations: No     Attends Club or Organization Meetings: Never     Marital Status:    Housing Stability: Low Risk  (3/30/2023)    Housing Stability Vital Sign     Unable to Pay for Housing in the Last Year: No     Number of Places Lived in the Last Year: 1     Unstable Housing in the Last Year: No             Allergies   Review of patient's allergies indicates:  No Known Allergies          Review of Systems  See HPI      [unfilled]  /68   Pulse 70   Temp 97.8 °F (36.6 °C) (Oral)   Ht 5' 9" (1.753 m)   Wt 71 kg (156 lb 8.4 oz)   SpO2 99%   BMI 23.11 kg/m²       GEN: NAD, pleasant, well developed, well nourished  HEENT: NCAT, PERRLA, EOMI, sclera clear, anicteric, O/P clear, MMM with no lesions  NECK: normal, supple with midline trachea, no LAD, no thyromegaly  LUNGS: CTAB, no w/r/r, normal respiratory effort  HEART: RRR, normal S1 and S2, no m/r/g, no palpitations, no edema  ABD: s/nt/nd, NABS, no organomegaly  SKIN: warm and dry with normal turgor, no rashes, no other lesions.   PSYCH: AOx3, appropriate mood and affect.   MSK: extremities warm/well perfused, normal ROM in all 4 extremities, no c/c/e.   NEURO: normal without focal findings, CN II-XII are intact      "

## 2023-09-07 ENCOUNTER — OFFICE VISIT (OUTPATIENT)
Dept: UROLOGY | Facility: CLINIC | Age: 83
End: 2023-09-07
Payer: MEDICARE

## 2023-09-07 VITALS
DIASTOLIC BLOOD PRESSURE: 65 MMHG | HEART RATE: 55 BPM | BODY MASS INDEX: 22.81 KG/M2 | SYSTOLIC BLOOD PRESSURE: 110 MMHG | WEIGHT: 154 LBS | HEIGHT: 69 IN

## 2023-09-07 DIAGNOSIS — C61 PROSTATE CANCER: ICD-10-CM

## 2023-09-07 DIAGNOSIS — Z87.898 HISTORY OF URINARY RETENTION: ICD-10-CM

## 2023-09-07 DIAGNOSIS — Z90.79 HISTORY OF RADICAL PROSTATECTOMY: ICD-10-CM

## 2023-09-07 DIAGNOSIS — R33.9 INCOMPLETE EMPTYING OF BLADDER: Primary | ICD-10-CM

## 2023-09-07 LAB
BILIRUB SERPL-MCNC: NORMAL MG/DL
BLOOD URINE, POC: NORMAL
CLARITY, POC UA: CLEAR
COLOR, POC UA: YELLOW
GLUCOSE UR QL STRIP: NORMAL
KETONES UR QL STRIP: NORMAL
LEUKOCYTE ESTERASE URINE, POC: NORMAL
NITRITE, POC UA: NORMAL
PH, POC UA: 6
POC RESIDUAL URINE VOLUME: 340 ML (ref 0–100)
PROTEIN, POC: NORMAL
SPECIFIC GRAVITY, POC UA: 1
UROBILINOGEN, POC UA: NORMAL

## 2023-09-07 PROCEDURE — 1160F PR REVIEW ALL MEDS BY PRESCRIBER/CLIN PHARMACIST DOCUMENTED: ICD-10-PCS | Mod: CPTII,S$GLB,, | Performed by: NURSE PRACTITIONER

## 2023-09-07 PROCEDURE — 1157F PR ADVANCE CARE PLAN OR EQUIV PRESENT IN MEDICAL RECORD: ICD-10-PCS | Mod: CPTII,S$GLB,, | Performed by: NURSE PRACTITIONER

## 2023-09-07 PROCEDURE — 3078F PR MOST RECENT DIASTOLIC BLOOD PRESSURE < 80 MM HG: ICD-10-PCS | Mod: CPTII,S$GLB,, | Performed by: NURSE PRACTITIONER

## 2023-09-07 PROCEDURE — 1101F PR PT FALLS ASSESS DOC 0-1 FALLS W/OUT INJ PAST YR: ICD-10-PCS | Mod: CPTII,S$GLB,, | Performed by: NURSE PRACTITIONER

## 2023-09-07 PROCEDURE — 99999 PR PBB SHADOW E&M-EST. PATIENT-LVL III: ICD-10-PCS | Mod: PBBFAC,,, | Performed by: NURSE PRACTITIONER

## 2023-09-07 PROCEDURE — 3078F DIAST BP <80 MM HG: CPT | Mod: CPTII,S$GLB,, | Performed by: NURSE PRACTITIONER

## 2023-09-07 PROCEDURE — 99214 PR OFFICE/OUTPT VISIT, EST, LEVL IV, 30-39 MIN: ICD-10-PCS | Mod: S$GLB,,, | Performed by: NURSE PRACTITIONER

## 2023-09-07 PROCEDURE — 51798 POCT BLADDER SCAN: ICD-10-PCS | Mod: S$GLB,,, | Performed by: NURSE PRACTITIONER

## 2023-09-07 PROCEDURE — 99999 PR PBB SHADOW E&M-EST. PATIENT-LVL III: CPT | Mod: PBBFAC,,, | Performed by: NURSE PRACTITIONER

## 2023-09-07 PROCEDURE — 1126F PR PAIN SEVERITY QUANTIFIED, NO PAIN PRESENT: ICD-10-PCS | Mod: CPTII,S$GLB,, | Performed by: NURSE PRACTITIONER

## 2023-09-07 PROCEDURE — 3288F FALL RISK ASSESSMENT DOCD: CPT | Mod: CPTII,S$GLB,, | Performed by: NURSE PRACTITIONER

## 2023-09-07 PROCEDURE — 3288F PR FALLS RISK ASSESSMENT DOCUMENTED: ICD-10-PCS | Mod: CPTII,S$GLB,, | Performed by: NURSE PRACTITIONER

## 2023-09-07 PROCEDURE — 81002 POCT URINE DIPSTICK WITHOUT MICROSCOPE: ICD-10-PCS | Mod: S$GLB,,, | Performed by: NURSE PRACTITIONER

## 2023-09-07 PROCEDURE — 1159F MED LIST DOCD IN RCRD: CPT | Mod: CPTII,S$GLB,, | Performed by: NURSE PRACTITIONER

## 2023-09-07 PROCEDURE — 1160F RVW MEDS BY RX/DR IN RCRD: CPT | Mod: CPTII,S$GLB,, | Performed by: NURSE PRACTITIONER

## 2023-09-07 PROCEDURE — 81002 URINALYSIS NONAUTO W/O SCOPE: CPT | Mod: S$GLB,,, | Performed by: NURSE PRACTITIONER

## 2023-09-07 PROCEDURE — 99214 OFFICE O/P EST MOD 30 MIN: CPT | Mod: S$GLB,,, | Performed by: NURSE PRACTITIONER

## 2023-09-07 PROCEDURE — 51798 US URINE CAPACITY MEASURE: CPT | Mod: S$GLB,,, | Performed by: NURSE PRACTITIONER

## 2023-09-07 PROCEDURE — 1159F PR MEDICATION LIST DOCUMENTED IN MEDICAL RECORD: ICD-10-PCS | Mod: CPTII,S$GLB,, | Performed by: NURSE PRACTITIONER

## 2023-09-07 PROCEDURE — 3074F PR MOST RECENT SYSTOLIC BLOOD PRESSURE < 130 MM HG: ICD-10-PCS | Mod: CPTII,S$GLB,, | Performed by: NURSE PRACTITIONER

## 2023-09-07 PROCEDURE — 1157F ADVNC CARE PLAN IN RCRD: CPT | Mod: CPTII,S$GLB,, | Performed by: NURSE PRACTITIONER

## 2023-09-07 PROCEDURE — 3074F SYST BP LT 130 MM HG: CPT | Mod: CPTII,S$GLB,, | Performed by: NURSE PRACTITIONER

## 2023-09-07 PROCEDURE — 1101F PT FALLS ASSESS-DOCD LE1/YR: CPT | Mod: CPTII,S$GLB,, | Performed by: NURSE PRACTITIONER

## 2023-09-07 PROCEDURE — 1126F AMNT PAIN NOTED NONE PRSNT: CPT | Mod: CPTII,S$GLB,, | Performed by: NURSE PRACTITIONER

## 2023-09-07 NOTE — PROGRESS NOTES
CHIEF COMPLAINT:    Diego Gunter is a 82 y.o. male presents today for LUTS.     HISTORY OF PRESENTING ILLINESS:    Diego Gunter is a 82 y.o.Type 2 DIABETIC male with a history of Urinary Retention following spinal surgery 04/28/2022.   Last successful VT was 08/05/2022; see HPI.     PMH antiphospholipid syndrome on chronic coumadin for left PE, DM2, prostate ca s/p prostatectomy 2011, MAC pneumonia dx Fall 2018, mild cognitive impairment, and HLD    Lab Results   Component Value Date    PSA 0.02 10/28/2015    PSADIAG 0.02 06/08/2022       Last clinic visit was 01/05/2023  Had a PVR of 177.   Here today for f/u visit.  Voices no complaints.   Pleased with urination.  Controlling his constipation with OTC meds     Here today for 6 month f/u visit.              REVIEW OF SYSTEMS:  Review of Systems   Constitutional: Negative.  Negative for chills and fever.   Eyes:  Negative for double vision.   Respiratory:  Negative for cough and shortness of breath.    Cardiovascular:  Negative for chest pain.   Gastrointestinal:  Negative for abdominal pain, constipation (controlled), diarrhea, nausea and vomiting.   Genitourinary:  Positive for frequency. Negative for dysuria, flank pain and hematuria.        Pleased with urination     Neurological:  Negative for dizziness and seizures.   Endo/Heme/Allergies:  Negative for polydipsia.         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 Dr. Pham    Hx of colonic polyps     followed by GI. Dr. Pham     Hyperlipidemia associated with type 2 diabetes mellitus     Hyperlipidemia LDL goal <100     Hypernatremia 05/05/2022    Hypertension associated with type 2 diabetes mellitus     Hypertension associated with type 2 diabetes mellitus     Hypotension     Intermittent confusion 04/29/2022    Overweight(278.02)     Prostate cancer 09/2011    followed by urology, Dr. Rogers    Pulmonary embolism     Reducible right inguinal hernia 10/10/2022    Right hemiparesis 07/22/2022    Secondary to cervical hematoma    Type II or unspecified type diabetes mellitus without mention of complication, not stated as uncontrolled     diet controlled    Urinary retention 04/29/2022       Past Surgical History:   Procedure Laterality Date    BRONCHOSCOPY N/A 10/15/2018    Procedure: BRONCHOSCOPY;  Surgeon: Pratibha Diagnostic Provider;  Location: [facility] OR 2ND FLR;  Service: Anesthesiology;  Laterality: N/A;    CARPAL TUNNEL RELEASE Right 1/11/2023    Procedure: RELEASE, CARPAL TUNNEL;  Surgeon: Romero Lazo MD;  Location: [facility];  Service: Orthopedics;  Laterality: Right;    CATARACT EXTRACTION, BILATERAL  2014    COLONOSCOPY N/A 5/16/2023    Procedure: COLONOSCOPY;  Surgeon: Buster Sequeira MD;  Location: [facility] (4TH FLR);  Service: Endoscopy;  Laterality: N/A;    DECOMPRESSION, NERVE, ULNAR Right 1/11/2023    Procedure: DECOMPRESSION, NERVE, ULNAR - right cub jolie and guyon's decompression as well as open right carpal tunnel release;  Surgeon: Romero Lazo MD;  Location: [facility] OR;  Service: Orthopedics;  Laterality: Right;    ESOPHAGOGASTRODUODENOSCOPY N/A 5/16/2023    Procedure: EGD (ESOPHAGOGASTRODUODENOSCOPY);  Surgeon: Buster Sequeira MD;  Location: [facility] (4TH FLR);  Service: Endoscopy;  Laterality: N/A;  inst mailed-RB  5/10/23 no answer for precall/mleone lpn    POSTERIOR CERVICAL LAMINECTOMY Bilateral 4/28/2022    Procedure: LAMINECTOMY, SPINE, CERVICAL, POSTERIOR APPROACH C3-6;  Surgeon: Carlos Miller MD;  Location: [facility]  OR 2ND FLR;  Service: Neurosurgery;  Laterality: Bilateral;    PROSTATECTOMY      REPAIR, HERNIA, INGUINAL, WITHOUT HISTORY OF PRIOR REPAIR, AGE 5 YEARS OR OLDER Right 10/10/2022    Procedure: REPAIR, HERNIA, INGUINAL, WITHOUT HISTORY OF PRIOR REPAIR, AGE 5 YEARS OR OLDER;  Surgeon: Dario Esquivel MD;  Location: [facility] OR 2ND FLR;  Service: General;  Laterality: Right;  open right inguinal hernia repair with mesh       Family History   Problem Relation Age of Onset    Colon cancer Mother     Diabetes Mother     Heart disease Father     Mental illness Sister     Diabetes Sister     Other Brother         polio as a child    Pulmonary fibrosis Brother     Diabetes Brother     Myasthenia gravis Brother     Parkinsonism Brother     Diabetes Brother     Dementia Brother     Lung cancer Brother         smoker    Diabetes Maternal Aunt     Diabetes Other     Esophageal cancer Neg Hx        Social History     Socioeconomic History    Marital status:      Spouse name: Addis    Number of children: 2   Occupational History    Occupation: [employer]    Tobacco Use    Smoking status: Former     Current packs/day: 0.00     Average packs/day: 0.3 packs/day for 5.0 years (1.3 ttl pk-yrs)     Types: Cigarettes     Start date: 10/2/1957     Quit date: 10/2/1962     Years since quittin.9    Smokeless tobacco: Former    Tobacco comments:     quit age 23   Substance and Sexual Activity    Alcohol use: Not Currently    Drug use: No    Sexual activity: Yes     Partners: Female     Social Determinants of Health     Financial Resource Strain: Low Risk  (3/30/2023)    Overall Financial Resource Strain (CARDIA)     Difficulty of Paying Living Expenses: Not hard at all   Food Insecurity: No Food Insecurity (3/30/2023)    Hunger Vital Sign     Worried About Running Out of Food in the Last Year: Never true     Ran Out of Food in the Last Year: Never true   Transportation Needs: No Transportation Needs (3/30/2023)    PRAPARE  - Transportation     Lack of Transportation (Medical): No     Lack of Transportation (Non-Medical): No   Physical Activity: Inactive (3/30/2023)    Exercise Vital Sign     Days of Exercise per Week: 0 days     Minutes of Exercise per Session: 10 min   Stress: No Stress Concern Present (3/30/2023)    [employer] - Occupational Stress Questionnaire     Feeling of Stress : Only a little   Social Connections: Moderately Integrated (3/30/2023)    Social Connection and Isolation Panel [NHANES]     Frequency of Communication with Friends and Family: Twice a week     Frequency of Social Gatherings with Friends and Family: Once a week     Attends Confucianist Services: More than 4 times per year     Active Member of Clubs or Organizations: No     Attends Club or Organization Meetings: Never     Marital Status:    Housing Stability: Low Risk  (3/30/2023)    Housing Stability Vital Sign     Unable to Pay for Housing in the Last Year: No     Number of Places Lived in the Last Year: 1     Unstable Housing in the Last Year: No       Allergies:  Patient has no known allergies.    Medications:    Current Outpatient Medications:     aspirin (ECOTRIN) 81 MG EC tablet, Take 81 mg by mouth once daily., Disp: , Rfl:     b complex vitamins capsule, Take 1 capsule by mouth once daily., Disp: , Rfl:     INV clofazimine capsule, Take 2 capsules (100 mg total) by mouth once daily with meals. Investigational Medication For Investigational use only. Request ID# [id number]., Disp: 200 capsule, Rfl: 0    nebulizer and compressor (OMBRA COMPRESSOR SYSTEM) Marcy, use to administer nebulized medication as directed, Disp: 1 each, Rfl: 0    pravastatin (PRAVACHOL) 10 MG tablet, Take 1 tablet (10 mg total) by mouth every evening., Disp: 90 tablet, Rfl: 1    sodium chloride 3% 3 % nebulizer solution, USE 4 ML VIAL IN NEBULIZER  TWICE DAILY, Disp: 480 mL, Rfl: 11    sodium chloride 7% 7 % nebulizer solution, use one vial in  nebulizer twice a day, Disp: , Rfl:     albuterol (VENTOLIN HFA) 90 mcg/actuation inhaler, Inhale 2 puffs into the lungs daily as needed (30 min before arikayce). Rescue, Disp: 18 g, Rfl: 11    ARIKAYCE 590 mg/8.4 mL NbSp, , Disp: , Rfl:     azelastine (ASTELIN) 137 mcg (0.1 %) nasal spray, 2 sprays (274 mcg total) by Nasal route 2 (two) times daily. (Patient not taking: Reported on 9/7/2023), Disp: 30 mL, Rfl: 0    PHYSICAL EXAMINATION:  Physical Exam  Vitals and nursing note reviewed.   Constitutional:       General: He is awake.      Appearance: Normal appearance.   HENT:      Head: Normocephalic.      Right Ear: External ear normal.      Left Ear: External ear normal.      Nose: Nose normal.   Cardiovascular:      Rate and Rhythm: Normal rate.   Pulmonary:      Effort: Pulmonary effort is normal. No respiratory distress.   Abdominal:      Tenderness: There is no abdominal tenderness. There is no right CVA tenderness or left CVA tenderness.   Musculoskeletal:         General: Normal range of motion.      Cervical back: Normal range of motion.   Skin:     General: Skin is warm and dry.   Neurological:      General: No focal deficit present.      Mental Status: He is alert and oriented to person, place, and time.   Psychiatric:         Mood and Affect: Mood normal.         Behavior: Behavior is cooperative.           LABS:      In office UA today was clear of active infection and blood.     The PVR in the office today done immediately after urination by the nurse was 340. (He did urinate again after).         Lab Results   Component Value Date    PSA 0.02 10/28/2015    PSADIAG 0.02 06/08/2022       Lab Results   Component Value Date    CREATININE 0.9 07/24/2023    EGFRNORACEVR >60 07/24/2023             IMPRESSION:    Encounter Diagnoses   Name Primary?    Incomplete emptying of bladder Yes    Prostate cancer     History of radical prostatectomy     History of urinary retention          Assessment:       1.  Incomplete emptying of bladder    2. Prostate cancer    3. History of radical prostatectomy    4. History of urinary retention        Plan:         I spent 30 minutes with the patient of which more than half was spent in direct consultation with the patient in regards to our treatment and plan.  We addressed the office findings and recent labs.   Education and recommendations of today's plan of care including home remedies and needed follow up with PCP.   We discussed the chief complaint; reviewed the LUTS and the possible contributory factors. He could have large capacity bladder.   Reassurance no infection but not completely emptying.   Discussed previous issues with not emptying.   Recently his Cr was normal.  We discussed managing with amaya, CIC, possible medical management.   He would like to hold off.   We will get a renal US tomorrow; recheck Cr.   If abnormal, then need amaya catheter.   Recommended lifestyle modifications with a proper, healthy diet, good hydration but during the day. Reducing bladder irritants.   Benefits of regular exercise.

## 2023-09-07 NOTE — LETTER
September 7, 2023    Diego Gunter  [address] [place] [address]             [facility] [address] [place], [place] [address] [facility] [address]  Phone: [phone] Dear {MR/MRS/MS/:46674} Jani:    ***      If you have any questions or concerns, please don't hesitate to call.    Sincerely,        Fiorella Garcia, NP

## 2023-09-08 ENCOUNTER — TELEPHONE (OUTPATIENT)
Dept: INFECTIOUS DISEASES | Facility: CLINIC | Age: 83
End: 2023-09-08
Payer: MEDICARE

## 2023-09-08 ENCOUNTER — HOSPITAL ENCOUNTER (OUTPATIENT)
Dept: RADIOLOGY | Facility: HOSPITAL | Age: 83
Discharge: HOME | End: 2023-09-08
Attending: NURSE PRACTITIONER
Payer: MEDICARE

## 2023-09-08 DIAGNOSIS — Z87.898 HISTORY OF URINARY RETENTION: ICD-10-CM

## 2023-09-08 DIAGNOSIS — R33.9 INCOMPLETE EMPTYING OF BLADDER: ICD-10-CM

## 2023-09-08 DIAGNOSIS — Z90.79 HISTORY OF RADICAL PROSTATECTOMY: ICD-10-CM

## 2023-09-08 DIAGNOSIS — C61 PROSTATE CANCER: ICD-10-CM

## 2023-09-08 PROCEDURE — 76770 US RETROPERITONEAL COMPLETE: ICD-10-PCS | Mod: 26,,, | Performed by: RADIOLOGY

## 2023-09-08 PROCEDURE — 76770 US EXAM ABDO BACK WALL COMP: CPT | Mod: 26,,, | Performed by: RADIOLOGY

## 2023-09-08 PROCEDURE — 76770 US EXAM ABDO BACK WALL COMP: CPT | Mod: TC

## 2023-09-08 NOTE — TELEPHONE ENCOUNTER
Spoke with patient's wife about ct results. Spoke with radiologist to discuss CT scan and compare with 3/2023 scan. Per radiology- stable, not worse since march

## 2023-09-11 ENCOUNTER — LAB VISIT (OUTPATIENT)
Dept: LAB | Facility: HOSPITAL | Age: 83
End: 2023-09-11
Attending: INTERNAL MEDICINE
Payer: MEDICARE

## 2023-09-11 DIAGNOSIS — A31.0 MYCOBACTERIUM AVIUM-INTRACELLULARE COMPLEX: ICD-10-CM

## 2023-09-11 PROCEDURE — 87015 SPECIMEN INFECT AGNT CONCNTJ: CPT | Performed by: INTERNAL MEDICINE

## 2023-09-11 PROCEDURE — 87118 MYCOBACTERIC IDENTIFICATION: CPT | Mod: 59 | Performed by: INTERNAL MEDICINE

## 2023-09-11 PROCEDURE — 87206 SMEAR FLUORESCENT/ACID STAI: CPT | Performed by: INTERNAL MEDICINE

## 2023-09-11 PROCEDURE — 87118 MYCOBACTERIC IDENTIFICATION: CPT | Mod: 91 | Performed by: INTERNAL MEDICINE

## 2023-09-11 PROCEDURE — 87150 DNA/RNA AMPLIFIED PROBE: CPT | Performed by: INTERNAL MEDICINE

## 2023-09-11 PROCEDURE — 87116 MYCOBACTERIA CULTURE: CPT | Performed by: INTERNAL MEDICINE

## 2023-09-14 LAB — M TB DNA SPEC QL NAA+PROBE: ABNORMAL

## 2023-09-18 ENCOUNTER — OFFICE VISIT (OUTPATIENT)
Dept: GASTROENTEROLOGY | Facility: CLINIC | Age: 83
End: 2023-09-18
Payer: MEDICARE

## 2023-09-18 VITALS
SYSTOLIC BLOOD PRESSURE: 116 MMHG | DIASTOLIC BLOOD PRESSURE: 71 MMHG | HEIGHT: 70 IN | BODY MASS INDEX: 22.82 KG/M2 | WEIGHT: 159.38 LBS | HEART RATE: 60 BPM

## 2023-09-18 DIAGNOSIS — K59.00 CONSTIPATION, UNSPECIFIED CONSTIPATION TYPE: ICD-10-CM

## 2023-09-18 DIAGNOSIS — R93.3 ABNORMAL CT SCAN, GASTROINTESTINAL TRACT: ICD-10-CM

## 2023-09-18 DIAGNOSIS — R13.10 DYSPHAGIA, UNSPECIFIED TYPE: Primary | ICD-10-CM

## 2023-09-18 PROCEDURE — 99214 OFFICE O/P EST MOD 30 MIN: CPT | Mod: S$GLB,,, | Performed by: INTERNAL MEDICINE

## 2023-09-18 PROCEDURE — 1159F PR MEDICATION LIST DOCUMENTED IN MEDICAL RECORD: ICD-10-PCS | Mod: CPTII,S$GLB,, | Performed by: INTERNAL MEDICINE

## 2023-09-18 PROCEDURE — 1101F PR PT FALLS ASSESS DOC 0-1 FALLS W/OUT INJ PAST YR: ICD-10-PCS | Mod: CPTII,S$GLB,, | Performed by: INTERNAL MEDICINE

## 2023-09-18 PROCEDURE — 1101F PT FALLS ASSESS-DOCD LE1/YR: CPT | Mod: CPTII,S$GLB,, | Performed by: INTERNAL MEDICINE

## 2023-09-18 PROCEDURE — 3074F PR MOST RECENT SYSTOLIC BLOOD PRESSURE < 130 MM HG: ICD-10-PCS | Mod: CPTII,S$GLB,, | Performed by: INTERNAL MEDICINE

## 2023-09-18 PROCEDURE — 3288F PR FALLS RISK ASSESSMENT DOCUMENTED: ICD-10-PCS | Mod: CPTII,S$GLB,, | Performed by: INTERNAL MEDICINE

## 2023-09-18 PROCEDURE — 1126F PR PAIN SEVERITY QUANTIFIED, NO PAIN PRESENT: ICD-10-PCS | Mod: CPTII,S$GLB,, | Performed by: INTERNAL MEDICINE

## 2023-09-18 PROCEDURE — 1157F ADVNC CARE PLAN IN RCRD: CPT | Mod: CPTII,S$GLB,, | Performed by: INTERNAL MEDICINE

## 2023-09-18 PROCEDURE — 99999 PR PBB SHADOW E&M-EST. PATIENT-LVL IV: CPT | Mod: PBBFAC,,, | Performed by: INTERNAL MEDICINE

## 2023-09-18 PROCEDURE — 3074F SYST BP LT 130 MM HG: CPT | Mod: CPTII,S$GLB,, | Performed by: INTERNAL MEDICINE

## 2023-09-18 PROCEDURE — 99999 PR PBB SHADOW E&M-EST. PATIENT-LVL IV: ICD-10-PCS | Mod: PBBFAC,,, | Performed by: INTERNAL MEDICINE

## 2023-09-18 PROCEDURE — 1159F MED LIST DOCD IN RCRD: CPT | Mod: CPTII,S$GLB,, | Performed by: INTERNAL MEDICINE

## 2023-09-18 PROCEDURE — 1126F AMNT PAIN NOTED NONE PRSNT: CPT | Mod: CPTII,S$GLB,, | Performed by: INTERNAL MEDICINE

## 2023-09-18 PROCEDURE — 99214 PR OFFICE/OUTPT VISIT, EST, LEVL IV, 30-39 MIN: ICD-10-PCS | Mod: S$GLB,,, | Performed by: INTERNAL MEDICINE

## 2023-09-18 PROCEDURE — 3078F PR MOST RECENT DIASTOLIC BLOOD PRESSURE < 80 MM HG: ICD-10-PCS | Mod: CPTII,S$GLB,, | Performed by: INTERNAL MEDICINE

## 2023-09-18 PROCEDURE — 3078F DIAST BP <80 MM HG: CPT | Mod: CPTII,S$GLB,, | Performed by: INTERNAL MEDICINE

## 2023-09-18 PROCEDURE — 3288F FALL RISK ASSESSMENT DOCD: CPT | Mod: CPTII,S$GLB,, | Performed by: INTERNAL MEDICINE

## 2023-09-18 PROCEDURE — 1157F PR ADVANCE CARE PLAN OR EQUIV PRESENT IN MEDICAL RECORD: ICD-10-PCS | Mod: CPTII,S$GLB,, | Performed by: INTERNAL MEDICINE

## 2023-09-18 RX ORDER — PANTOPRAZOLE SODIUM 40 MG/1
40 TABLET, DELAYED RELEASE ORAL DAILY
Qty: 30 TABLET | Refills: 11 | Status: SHIPPED | OUTPATIENT
Start: 2023-09-18 | End: 2023-10-16 | Stop reason: SDUPTHER

## 2023-09-18 NOTE — PROGRESS NOTES
[facility] Follow-Up Visit    Chief Complaint/Reason for follow-up: dysphagia    PCP:   Portia Ferreira   [address] [facility] [address]      HPI:  This is a 83 y.o. M with history of PE now off anticoagulation, history of prostate cancer, spinal surgery c/b hematoma requiring evacuation, emphysema and cavitary MAC who presents to GI clinic.    He presented initially to discuss constipation.Reported change in bowel movements one month prior without any preceding events or changes.   I recommended metamucil, miralax, CT abdomen and colonoscopy.  CT did not show any acute bowel changes. He later presented to the ED with worsening pain, CT showed right inguinal hernia with upstream dilation. His incarcerated hernia was reduced in the ED and he was scheduled for elective repair. He underwent right inguinal hernia repair on 10/10/2022. Colonoscopy had not been done.   At a subsequent visit with me, he was referred for EGD / colonoscopy for the constipation and anemia.   Last EGD 5/2023 with 1 cm hiatal hernia, otherwise normal.  Colonoscopy 5/2023 with 2 mm polyp, diverticulosis; path with tubular adenoma. Repeat not recommended due to age.     Today, he presents with his wife.  Reports issues with throat clearing, voice projection and occasional solid food dysphagia.   Was eating a waffle yesterday and felt like he may choke. Is now taking small bites / eating slowly. This has been present for at least one month  He has to cough to clear his throat.  Denies dysphagia to liquids or pills.  Denies any overt GI bleeding.  Denies unintentional weight loss.  Denies heartburn, sour taste in the mouth, regurgitation, nausea or vomiting.     Still having issues with constipation, has a BM once per week. Taking miralax every other day.     Remote tobacco use.  He used to drink beer regularly on the weekends, but has quit. Was not a daily drinker even then.  Denies drug use.  No previous  "abdominal surgeries.     Denies any dysphagia  Denies vomiting  Denies unintentional weight loss      PMH, PSH, SH, FH reviewed     All: Review of patient's allergies indicates:  No Known Allergies    Current Outpatient Rx   Medication Sig Dispense Refill    aspirin (ECOTRIN) 81 MG EC tablet Take 81 mg by mouth once daily.      b complex vitamins capsule Take 1 capsule by mouth once daily.      nebulizer and compressor (OMBRA COMPRESSOR SYSTEM) Marcy use to administer nebulized medication as directed 1 each 0    pravastatin (PRAVACHOL) 10 MG tablet Take 1 tablet (10 mg total) by mouth every evening. 90 tablet 1    albuterol (VENTOLIN HFA) 90 mcg/actuation inhaler Inhale 2 puffs into the lungs daily as needed (30 min before arikayce). Rescue (Patient not taking: Reported on 9/18/2023) 18 g 11    ARIKAYCE 590 mg/8.4 mL NbSp       azelastine (ASTELIN) 137 mcg (0.1 %) nasal spray 2 sprays (274 mcg total) by Nasal route 2 (two) times daily. (Patient not taking: Reported on 9/7/2023) 30 mL 0    INV clofazimine capsule Take 2 capsules (100 mg total) by mouth once daily with meals. Investigational Medication For Investigational use only. Request ID# [id number]. (Patient not taking: Reported on 9/18/2023) 200 capsule 0    pantoprazole (PROTONIX) 40 MG tablet Take 1 tablet (40 mg total) by mouth once daily. Take 30 minutes before breakfast 30 tablet 11    sodium chloride 3% 3 % nebulizer solution USE 4 ML VIAL IN NEBULIZER  TWICE DAILY (Patient not taking: Reported on 9/18/2023) 480 mL 11    sodium chloride 7% 7 % nebulizer solution use one vial in nebulizer twice a day         Objective Findings:    Vital Signs:  /71   Pulse 60   Ht 5' 10" (1.778 m)   Wt 72.3 kg (159 lb 6.3 oz)   BMI 22.87 kg/m²   Body mass index is 22.87 kg/m².    Physical Exam:  General Appearance: well-appearing, no acute distress  Head:   normocephalic, without obvious abnormality  Eyes:    PERRL, EOMI  ENT: neck supple; moist mucus " membranes  Lungs: clear to auscultation bilaterally  Heart:  regular rate and rhythm, S1, S2 normal, no murmurs heard  Abdomen: soft, non-tender, non-distended with bowel sounds in all four quadrants. No hepatosplenomegaly, ascites, or mass  Extremities: 2+ pulses, no clubbing, cyanosis or edema  Skin: no rash  Neurologic: no focal motor deficits      Labs:  Lab Results   Component Value Date    WBC 6.00 11/04/2022    HGB 10.1 (L) 11/04/2022    HCT 31.2 (L) 11/04/2022     11/04/2022    CHOL 135 03/08/2023    TRIG 121 03/08/2023    HDL 40 03/08/2023    ALT 23 07/24/2023    AST 21 07/24/2023     07/24/2023    K 4.1 07/24/2023     07/24/2023    CREATININE 0.9 07/24/2023    BUN 14 07/24/2023    CO2 28 07/24/2023    TSH 3.649 04/28/2022    PSA 0.02 10/28/2015    INR 1.1 09/19/2022    HGBA1C 5.5 03/08/2023       IMAGING:  CT abdomen 9/2022  Small bowel, mesentery, and fluid containing right inguinal hernia with partial upstream small bowel obstruction and swirling of the mesenteric vessels concerning for strangulated hernia.     Diverticulosis without evidence of acute diverticulitis.     Cholelithiasis without evidence of acute cholecystitis.     Mild nodular contour of the liver which may suggest chronic liver disease.     Bibasilar pulmonary emphysema.    PROCEDURES:  Last EGD 5/2023 with 1 cm hiatal hernia, otherwise normal.  Colonoscopy 5/2023 with 2 mm polyp, diverticulosis; path with tubular adenoma. Repeat not recommended due to age.       Assessment/Plan:  This is a 83 y.o. M with history of PE now off anticoagulation, history of prostate cancer, spinal surgery c/b hematoma requiring evacuation, emphysema and cavitary MAC who presents to GI clinic.    Throat clearing  Coughing  ? Solid food dysphagia - unclear if true dysphagia, he reports more throat clearing and having to cough. EGD this year 5/23 with small hiatal hernia, otherwise normal  Will trial empiric PPI once daily 30 mins before  breakfast for 6-8 weeks  Esophogram with barium tablet  Referral to ENT    Nodular contour of liver  Seen on CT scan 9/22 - US abdomen ordered. Has a remote history of alcohol use.    Constipation  Colonoscopy 2023 with small adenomatous polyp, repeat not advised due to age  Counseled on water, dietary fiber and fiber supplementation with miralax as needed        Order summary:  Orders Placed This Encounter    US Abdomen Limited (LIVER)    FL Esophagram With Barium Tablet    Ambulatory referral/consult to ENT    pantoprazole (PROTONIX) 40 MG tablet         Thank you so much for allowing me to participate in the care of Diego Whitt MD  Gastroenterology   [facility]

## 2023-09-19 ENCOUNTER — TELEPHONE (OUTPATIENT)
Dept: ADMINISTRATIVE | Facility: HOSPITAL | Age: 83
End: 2023-09-19
Payer: MEDICARE

## 2023-09-19 ENCOUNTER — HOSPITAL ENCOUNTER (OUTPATIENT)
Dept: RADIOLOGY | Facility: HOSPITAL | Age: 83
Discharge: HOME | End: 2023-09-19
Attending: INTERNAL MEDICINE
Payer: MEDICARE

## 2023-09-19 DIAGNOSIS — R13.10 DYSPHAGIA, UNSPECIFIED TYPE: ICD-10-CM

## 2023-09-19 PROCEDURE — 74220 X-RAY XM ESOPHAGUS 1CNTRST: CPT | Mod: TC

## 2023-09-19 PROCEDURE — 25500020 PHARM REV CODE 255: Performed by: INTERNAL MEDICINE

## 2023-09-19 PROCEDURE — A9698 NON-RAD CONTRAST MATERIALNOC: HCPCS | Performed by: INTERNAL MEDICINE

## 2023-09-19 PROCEDURE — 74220 X-RAY XM ESOPHAGUS 1CNTRST: CPT | Mod: 26,,, | Performed by: RADIOLOGY

## 2023-09-19 PROCEDURE — 74220 FL ESOPHAGRAM WITH BARIUM TABLET: ICD-10-PCS | Mod: 26,,, | Performed by: RADIOLOGY

## 2023-09-19 RX ADMIN — BARIUM SULFATE 250 ML: 0.6 SUSPENSION ORAL at 08:09

## 2023-09-19 RX ADMIN — BARIUM SULFATE 200 ML: 980 POWDER, FOR SUSPENSION ORAL at 08:09

## 2023-09-19 NOTE — TELEPHONE ENCOUNTER
"Patient has a referral placed to see ENT for Dx: Dysphagia, unspecified type. Patient is already an established w/ Dr. Hall. Attempted to schedule patient an appointment but there are no availabilities. Also attempted to schedule patient with another provider but "per appt desk", it asks for me to schedule patient with previous provider. Please contact patient to schedule an appointment if possible. Thank you  "

## 2023-09-21 ENCOUNTER — OFFICE VISIT (OUTPATIENT)
Dept: INFECTIOUS DISEASES | Facility: CLINIC | Age: 83
End: 2023-09-21
Payer: MEDICARE

## 2023-09-21 VITALS
BODY MASS INDEX: 23.07 KG/M2 | SYSTOLIC BLOOD PRESSURE: 117 MMHG | DIASTOLIC BLOOD PRESSURE: 68 MMHG | WEIGHT: 161.19 LBS | HEIGHT: 70 IN

## 2023-09-21 DIAGNOSIS — J98.4 CAVITARY LUNG DISEASE: ICD-10-CM

## 2023-09-21 DIAGNOSIS — A31.0 MYCOBACTERIUM AVIUM-INTRACELLULARE COMPLEX: Primary | ICD-10-CM

## 2023-09-21 DIAGNOSIS — Z16.29: ICD-10-CM

## 2023-09-21 PROCEDURE — 3074F PR MOST RECENT SYSTOLIC BLOOD PRESSURE < 130 MM HG: ICD-10-PCS | Mod: CPTII,S$GLB,, | Performed by: INTERNAL MEDICINE

## 2023-09-21 PROCEDURE — 3288F PR FALLS RISK ASSESSMENT DOCUMENTED: ICD-10-PCS | Mod: CPTII,S$GLB,, | Performed by: INTERNAL MEDICINE

## 2023-09-21 PROCEDURE — 99215 PR OFFICE/OUTPT VISIT, EST, LEVL V, 40-54 MIN: ICD-10-PCS | Mod: S$GLB,,, | Performed by: INTERNAL MEDICINE

## 2023-09-21 PROCEDURE — 1126F PR PAIN SEVERITY QUANTIFIED, NO PAIN PRESENT: ICD-10-PCS | Mod: CPTII,S$GLB,, | Performed by: INTERNAL MEDICINE

## 2023-09-21 PROCEDURE — 99999 PR PBB SHADOW E&M-EST. PATIENT-LVL III: ICD-10-PCS | Mod: PBBFAC,,, | Performed by: INTERNAL MEDICINE

## 2023-09-21 PROCEDURE — 99215 OFFICE O/P EST HI 40 MIN: CPT | Mod: S$GLB,,, | Performed by: INTERNAL MEDICINE

## 2023-09-21 PROCEDURE — 1157F PR ADVANCE CARE PLAN OR EQUIV PRESENT IN MEDICAL RECORD: ICD-10-PCS | Mod: CPTII,S$GLB,, | Performed by: INTERNAL MEDICINE

## 2023-09-21 PROCEDURE — 3078F DIAST BP <80 MM HG: CPT | Mod: CPTII,S$GLB,, | Performed by: INTERNAL MEDICINE

## 2023-09-21 PROCEDURE — 1126F AMNT PAIN NOTED NONE PRSNT: CPT | Mod: CPTII,S$GLB,, | Performed by: INTERNAL MEDICINE

## 2023-09-21 PROCEDURE — 1101F PR PT FALLS ASSESS DOC 0-1 FALLS W/OUT INJ PAST YR: ICD-10-PCS | Mod: CPTII,S$GLB,, | Performed by: INTERNAL MEDICINE

## 2023-09-21 PROCEDURE — 3078F PR MOST RECENT DIASTOLIC BLOOD PRESSURE < 80 MM HG: ICD-10-PCS | Mod: CPTII,S$GLB,, | Performed by: INTERNAL MEDICINE

## 2023-09-21 PROCEDURE — 1101F PT FALLS ASSESS-DOCD LE1/YR: CPT | Mod: CPTII,S$GLB,, | Performed by: INTERNAL MEDICINE

## 2023-09-21 PROCEDURE — 99999 PR PBB SHADOW E&M-EST. PATIENT-LVL III: CPT | Mod: PBBFAC,,, | Performed by: INTERNAL MEDICINE

## 2023-09-21 PROCEDURE — 3288F FALL RISK ASSESSMENT DOCD: CPT | Mod: CPTII,S$GLB,, | Performed by: INTERNAL MEDICINE

## 2023-09-21 PROCEDURE — 1157F ADVNC CARE PLAN IN RCRD: CPT | Mod: CPTII,S$GLB,, | Performed by: INTERNAL MEDICINE

## 2023-09-21 PROCEDURE — 3074F SYST BP LT 130 MM HG: CPT | Mod: CPTII,S$GLB,, | Performed by: INTERNAL MEDICINE

## 2023-09-25 ENCOUNTER — OFFICE VISIT (OUTPATIENT)
Dept: OTOLARYNGOLOGY | Facility: CLINIC | Age: 83
End: 2023-09-25
Payer: MEDICARE

## 2023-09-25 ENCOUNTER — HOSPITAL ENCOUNTER (OUTPATIENT)
Dept: RADIOLOGY | Facility: HOSPITAL | Age: 83
Discharge: HOME | End: 2023-09-25
Attending: INTERNAL MEDICINE
Payer: MEDICARE

## 2023-09-25 VITALS
BODY MASS INDEX: 22.84 KG/M2 | SYSTOLIC BLOOD PRESSURE: 100 MMHG | WEIGHT: 159.19 LBS | DIASTOLIC BLOOD PRESSURE: 64 MMHG

## 2023-09-25 DIAGNOSIS — R09.89 THROAT CLEARING: Primary | ICD-10-CM

## 2023-09-25 DIAGNOSIS — R13.10 DYSPHAGIA, UNSPECIFIED TYPE: ICD-10-CM

## 2023-09-25 DIAGNOSIS — R93.3 ABNORMAL CT SCAN, GASTROINTESTINAL TRACT: ICD-10-CM

## 2023-09-25 PROCEDURE — 3288F FALL RISK ASSESSMENT DOCD: CPT | Mod: CPTII,S$GLB,, | Performed by: OTOLARYNGOLOGY

## 2023-09-25 PROCEDURE — 76705 ECHO EXAM OF ABDOMEN: CPT | Mod: TC

## 2023-09-25 PROCEDURE — 99214 PR OFFICE/OUTPT VISIT, EST, LEVL IV, 30-39 MIN: ICD-10-PCS | Mod: 25,S$GLB,, | Performed by: OTOLARYNGOLOGY

## 2023-09-25 PROCEDURE — 1159F MED LIST DOCD IN RCRD: CPT | Mod: CPTII,S$GLB,, | Performed by: OTOLARYNGOLOGY

## 2023-09-25 PROCEDURE — 76705 ECHO EXAM OF ABDOMEN: CPT | Mod: 26,,, | Performed by: RADIOLOGY

## 2023-09-25 PROCEDURE — 1101F PR PT FALLS ASSESS DOC 0-1 FALLS W/OUT INJ PAST YR: ICD-10-PCS | Mod: CPTII,S$GLB,, | Performed by: OTOLARYNGOLOGY

## 2023-09-25 PROCEDURE — 76705 US ABDOMEN LIMITED: ICD-10-PCS | Mod: 26,,, | Performed by: RADIOLOGY

## 2023-09-25 PROCEDURE — 3288F PR FALLS RISK ASSESSMENT DOCUMENTED: ICD-10-PCS | Mod: CPTII,S$GLB,, | Performed by: OTOLARYNGOLOGY

## 2023-09-25 PROCEDURE — 1101F PT FALLS ASSESS-DOCD LE1/YR: CPT | Mod: CPTII,S$GLB,, | Performed by: OTOLARYNGOLOGY

## 2023-09-25 PROCEDURE — 99214 OFFICE O/P EST MOD 30 MIN: CPT | Mod: 25,S$GLB,, | Performed by: OTOLARYNGOLOGY

## 2023-09-25 PROCEDURE — 1126F AMNT PAIN NOTED NONE PRSNT: CPT | Mod: CPTII,S$GLB,, | Performed by: OTOLARYNGOLOGY

## 2023-09-25 PROCEDURE — 3078F PR MOST RECENT DIASTOLIC BLOOD PRESSURE < 80 MM HG: ICD-10-PCS | Mod: CPTII,S$GLB,, | Performed by: OTOLARYNGOLOGY

## 2023-09-25 PROCEDURE — 3078F DIAST BP <80 MM HG: CPT | Mod: CPTII,S$GLB,, | Performed by: OTOLARYNGOLOGY

## 2023-09-25 PROCEDURE — 3074F PR MOST RECENT SYSTOLIC BLOOD PRESSURE < 130 MM HG: ICD-10-PCS | Mod: CPTII,S$GLB,, | Performed by: OTOLARYNGOLOGY

## 2023-09-25 PROCEDURE — 31575 DIAGNOSTIC LARYNGOSCOPY: CPT | Mod: S$GLB,,, | Performed by: OTOLARYNGOLOGY

## 2023-09-25 PROCEDURE — 31575 PR LARYNGOSCOPY, FLEXIBLE; DIAGNOSTIC: ICD-10-PCS | Mod: S$GLB,,, | Performed by: OTOLARYNGOLOGY

## 2023-09-25 PROCEDURE — 1157F ADVNC CARE PLAN IN RCRD: CPT | Mod: CPTII,S$GLB,, | Performed by: OTOLARYNGOLOGY

## 2023-09-25 PROCEDURE — 1159F PR MEDICATION LIST DOCUMENTED IN MEDICAL RECORD: ICD-10-PCS | Mod: CPTII,S$GLB,, | Performed by: OTOLARYNGOLOGY

## 2023-09-25 PROCEDURE — 1157F PR ADVANCE CARE PLAN OR EQUIV PRESENT IN MEDICAL RECORD: ICD-10-PCS | Mod: CPTII,S$GLB,, | Performed by: OTOLARYNGOLOGY

## 2023-09-25 PROCEDURE — 1126F PR PAIN SEVERITY QUANTIFIED, NO PAIN PRESENT: ICD-10-PCS | Mod: CPTII,S$GLB,, | Performed by: OTOLARYNGOLOGY

## 2023-09-25 PROCEDURE — 3074F SYST BP LT 130 MM HG: CPT | Mod: CPTII,S$GLB,, | Performed by: OTOLARYNGOLOGY

## 2023-09-25 NOTE — PATIENT INSTRUCTIONS
Clearing your throat leads to more swelling in the voice box.  This will worsen your symptoms.  You should try to minimize throat clearing as much as possible.    Can try gaviscon liquid over the counter - take 15 minutes after a meal as needed for throat phlegm    Avoid mouthwash with alcohol such as listerine. You should use biotene mouth wash    Can sleep with bedside humidifier     You should aim to drink 2 to 3 liters of water daily if you are drinking one cup of coffee.  If you have trouble drinking water, you can cut back or cut out caffeine. ouble drinking water, you can cut back or cut out caffeine.    Lozenges:  Halls Breezers - sold with cough drops, Can try sugar free lozenges with pectin ,  such as halls fruit breezers    Xylimelts, on toothpaste aisle, these are convenient for when you are talking and or sleeping - they stick to gumline and provide moisture - Ipracom or ELIKE        Steam and gargle - 3x day  You may consider getting a facial steamer, breathe in warm moist air  through mouth and nose to hydrate vocal folds. On amazon, I like the [employer] version that is under $25.        Gargle:   1/2 tsp each salt, baking soda, clear corn syrup, 6 oz warm water  Sip, gargle quietly, spit, repeat until gone, don't eat/drink for 30 minutes after.      Chew gum with baking soda after meals, Orbit White     Order online, when it's time to get more Gaviscon, either Alginate therapy such as Reflux Gourmet  or Gaviscon Advance can be used following meals and at bedtime for reflux coverage. The Acid Watcher Diet by Dr Aden as well as the Chronic Cough [place] by Dr Alonso are good reads to gain improved understanding of dietary and behavioral changes that can aid in reduction of LPRD, and to better understand cough and cough control     [email or web address]

## 2023-09-25 NOTE — PROGRESS NOTES
OTOLARYNGOLOGY CLINIC NOTE  Date:  09/25/2023     Chief complaint:  Chief Complaint   Patient presents with    Dysphagia     Dysphagia, choking on food        History of Present Illness  Diego Gunter is a 83 y.o. male  presenting today for a followup.  Here today for new problem. Seen in the past for hearing loss  Having mucous and cough ; + throat clearing. No issue with liquids   Has seen gi and had egd and had esophagram as well    Had a waffle that choked on and got scared and started choking  Here with his wife  Per wife Has a softer voice and lower pitch  No family history of parkinsons  No memory loss, no balance issues no tremor  Swallowing issues have been going on for a couple of months    Got covid in 2021 ; had a blood clot after that has been going downhill since then per wife    After had spine surgery from bleeding on the spine has had abnormality of gait; posterior cervical spine ; had problem with speech after that ; did therapy for a year . Swallowing problem was a gradual progression and ahrd to pinpoint when it was     Voice at times sounds kind of breathy to me but not with tremor     MBS in 2016; saw neuro in 2016 for liquid dysphagia and completed speech therapy     Past Medical History  Past Medical History:   Diagnosis Date    Acute encephalopathy     Anemia     Antiphospholipid syndrome 11/19/2021    Carpal tunnel syndrome on right 01/10/2023    Centrilobular emphysema     COPD (chronic obstructive pulmonary disease)     Cubital tunnel syndrome on right 01/10/2023    Dysphagia     Dysphagia, oropharyngeal 06/17/2016    - improved with speech therapy    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 Dr. Pham    Hx of colonic polyps     followed by GI. Dr. Pham    Hyperlipidemia associated with type 2 diabetes mellitus     Hyperlipidemia LDL goal <100     Hypernatremia 05/05/2022    Hypertension associated with type 2  diabetes mellitus     Hypertension associated with type 2 diabetes mellitus     Hypotension     Intermittent confusion 04/29/2022    Overweight(278.02)     Prostate cancer 09/2011    followed by urology, Dr. Rogers    Pulmonary embolism     Reducible right inguinal hernia 10/10/2022    Right hemiparesis 07/22/2022    Secondary to cervical hematoma    Type II or unspecified type diabetes mellitus without mention of complication, not stated as uncontrolled     diet controlled    Urinary retention 04/29/2022        Past Surgical History  Past Surgical History:   Procedure Laterality Date    BRONCHOSCOPY N/A 10/15/2018    Procedure: BRONCHOSCOPY;  Surgeon: [facility];  Location: [facility] OR 2ND FLR;  Service: Anesthesiology;  Laterality: N/A;    CARPAL TUNNEL RELEASE Right 1/11/2023    Procedure: RELEASE, CARPAL TUNNEL;  Surgeon: Romero Lazo MD;  Location: [facility] OR;  Service: Orthopedics;  Laterality: Right;    CATARACT EXTRACTION, BILATERAL  2014    COLONOSCOPY N/A 5/16/2023    Procedure: COLONOSCOPY;  Surgeon: Buster Sequeira MD;  Location: [facility] (4TH FLR);  Service: Endoscopy;  Laterality: N/A;    DECOMPRESSION, NERVE, ULNAR Right 1/11/2023    Procedure: DECOMPRESSION, NERVE, ULNAR - right cub jolie and guyon's decompression as well as open right carpal tunnel release;  Surgeon: Romero Lazo MD;  Location: [facility] OR;  Service: Orthopedics;  Laterality: Right;    ESOPHAGOGASTRODUODENOSCOPY N/A 5/16/2023    Procedure: EGD (ESOPHAGOGASTRODUODENOSCOPY);  Surgeon: Buster Sequeira MD;  Location: [facility] (4TH FLR);  Service: Endoscopy;  Laterality: N/A;  inst mailed-RB  5/10/23 no answer for precall/mleone lpn    POSTERIOR CERVICAL LAMINECTOMY Bilateral 4/28/2022    Procedure: LAMINECTOMY, SPINE, CERVICAL, POSTERIOR APPROACH C3-6;  Surgeon: Carlos Miller MD;  Location: [facility] OR 2ND FLR;  Service: Neurosurgery;  Laterality: Bilateral;    PROSTATECTOMY      REPAIR, HERNIA, INGUINAL, WITHOUT HISTORY OF PRIOR  REPAIR, AGE 5 YEARS OR OLDER Right 10/10/2022    Procedure: REPAIR, HERNIA, INGUINAL, WITHOUT HISTORY OF PRIOR REPAIR, AGE 5 YEARS OR OLDER;  Surgeon: Dario Esquivel MD;  Location: [facility] OR [address] [place] [address];  Service: General;  Laterality: Right;  open right inguinal hernia repair with mesh        Medications  Current Outpatient Medications on File Prior to Visit   Medication Sig Dispense Refill    albuterol (VENTOLIN HFA) 90 mcg/actuation inhaler Inhale 2 puffs into the lungs daily as needed (30 min before arikayce). Rescue (Patient not taking: Reported on 9/18/2023) 18 g 11    ARIKAYCE 590 mg/8.4 mL NbSp       aspirin (ECOTRIN) 81 MG EC tablet Take 81 mg by mouth once daily.      azelastine (ASTELIN) 137 mcg (0.1 %) nasal spray 2 sprays (274 mcg total) by Nasal route 2 (two) times daily. (Patient not taking: Reported on 9/7/2023) 30 mL 0    b complex vitamins capsule Take 1 capsule by mouth once daily.      INV clofazimine capsule Take 2 capsules (100 mg total) by mouth once daily with meals. Investigational Medication For Investigational use only. Request ID# [id number]. (Patient not taking: Reported on 9/18/2023) 200 capsule 0    nebulizer and compressor (Zervant COMPRESSOR SYSTEM) Marcy use to administer nebulized medication as directed 1 each 0    pantoprazole (PROTONIX) 40 MG tablet Take 1 tablet (40 mg total) by mouth once daily. Take 30 minutes before breakfast 30 tablet 11    pravastatin (PRAVACHOL) 10 MG tablet Take 1 tablet (10 mg total) by mouth every evening. 90 tablet 1    sodium chloride 3% 3 % nebulizer solution USE 4 ML VIAL IN NEBULIZER  TWICE DAILY (Patient not taking: Reported on 9/18/2023) 480 mL 11    sodium chloride 7% 7 % nebulizer solution use one vial in nebulizer twice a day      [DISCONTINUED] omega-3 fatty acids 1,000 mg Cap Take 2 g by mouth.       No current facility-administered medications on file prior to visit.       Review of Systems  Review of Systems   Constitutional: Negative.    Eyes:  Negative.    Respiratory: Negative.     Cardiovascular: Negative.    Gastrointestinal:  Positive for constipation.   Genitourinary: Negative.    Musculoskeletal:  Positive for back pain.   Skin: Negative.    Neurological: Negative.    Psychiatric/Behavioral: Negative.          Social History   reports that he quit smoking about 61 years ago. His smoking use included cigarettes. He started smoking about 66 years ago. He has a 1.3 pack-year smoking history. He has quit using smokeless tobacco. He reports that he does not currently use alcohol. He reports that he does not use drugs.     Family History  Family History   Problem Relation Age of Onset    Colon cancer Mother     Diabetes Mother     Heart disease Father     Mental illness Sister     Diabetes Sister     Other Brother         polio as a child    Pulmonary fibrosis Brother     Diabetes Brother     Myasthenia gravis Brother     Parkinsonism Brother     Diabetes Brother     Dementia Brother     Lung cancer Brother         smoker    Diabetes Maternal Aunt     Diabetes Other     Esophageal cancer Neg Hx         Physical Exam   There were no vitals filed for this visit. There is no height or weight on file to calculate BMI.            GENERAL: no acute distress.  HEAD: normocephalic.   EYES: No scleral icterus  EARS: external ear without lesion, normal pinna shape and position.  External auditory canal with normal cerumen, tympanic membrane fully visible, no perforation , no retraction. No middle ear effusion. Ossicles intact.   NOSE: external nose without significant bony abnormality  ORAL CAVITY/OROPHARYNX: tongue mobile.   NECK: trachea midline.   LYMPH NODES:No cervical lymphadenopathy.  RESPIRATORY: no stridor, no stertor. Voice normal. Respirations nonlabored.  NEURO: alert, responds to questions appropriately.    PSYCH:mood appropriate    PROCEDURE NOTE  NAME OF PROCEDURE: Flexible Laryngoscopy, diagnostic  INDICATIONS: gag reflex precludes mirror  exam,dysphagia  FINDINGS: no mass, slight pooling of secretion postcricoid. Dry mucosa vocal fold with pseudosulcus    Consent: After procedure was explained in detail and all questions answered, verbal consent was obtained for performing flexible laryngoscopy.  Anesthesia: topical 4% lidocaine and neosynephrine  Procedure: With patient in seated position, the scope was inserted into the bilateral nasal passageway and advanced atraumatically into the nasopharynx to examine the following structures:    Nasopharynx: no mass or lesion noted in nasopharynx.   Oropharynx: base of tongue without  mass or ulceration. Lingual tonsils normal in appearance  Hypopharynx: posterior pharyngeal wall without mass or lesion. No pooling of secretions. Pyriform sinuses visible without mass or lesion  Larynx: epiglottis normal without lesion. False vocal folds without edema/erythema/lesion. True vocal folds mobile and without lesion. Mild interarytenoid edema no erythema . Postcricoid region with mild edema no lesion   Subglottis: visualized portion of subglottis normal in appearance    After examination performed, the scope was removed atraumatically . The patient tolerated the procedure well. Photodocumentation obtained with representative images below, all images and/or videos uploaded in media section of epic.          Imaging:  The patient does not have any new imaging of the head and neck since last visit.     Labs:  CBC  Recent Labs   Lab 09/19/22  0040 10/21/22  0908 11/04/22  1200   WBC 10.41 6.65 6.00   Hemoglobin 13.3 L 9.7 L 10.1 L   Hematocrit 40.3 30.0 L 31.2 L   MCV 81 L 84 86   Platelets 275 396 340     BMP  Recent Labs   Lab 05/11/22  0717 05/11/22  0849 05/12/22  0804 05/12/22 2010 05/13/22  0811 06/08/22  1046 03/08/23  1153 07/07/23  1358 07/24/23  1044   Glucose 144 H  --  132 H  --  97   < > 85 100 84   Sodium 148 H   < > 142  144   < > 140  141   < > 140 140 140   Potassium 4.6  --  4.2  --  4.3   < > 4.4 4.5  "4.1   Chloride 114 H  --  107  --  107   < > 104 105 106   CO2 26  --  25  --  22 L   < > 30 H 30 H 28   BUN 15  --  16  --  15   < > 18 13 14   Creatinine 0.8  --  0.8  --  0.7   < > 1.0 1.0 0.9   Calcium 9.4  --  9.3  --  9.1   < > 9.9 9.6 9.2   Phosphorus 4.1  --  3.1  --  3.7  --   --   --   --    Magnesium 2.2  --  2.1  --  2.1  --   --   --   --     < > = values in this interval not displayed.     COAGS  Recent Labs   Lab 07/13/22  1027 09/09/22  1053 09/19/22  0040   INR 2.0 H 1.1 1.1       Assessment  1. Dysphagia, unspecified type  - Ambulatory referral/consult to ENT  - Ambulatory referral/consult to Speech Therapy; Future  - Ambulatory referral/consult to Neurology; Future    2. Throat clearing  - Ambulatory referral/consult to Speech Therapy; Future  - Ambulatory referral/consult to Neurology; Future       Plan:  Discussed plan of care with patient in detail and all questions answered. Patient reported understanding of plan of care. I gave the patient the opportunity to ask questions and patient confirmed all questions answered to satisfaction.     Updated fees, may need mbs also but would like fees first. Also would like slp to see for throat clearin micheal  Had mbs in 2022 so will try to get fees; unclear if needs to see neuro slp also    Had liquid dysphagia in 2016 had seen neuro at that time . Last saw dr barrios in 2022; saw   [place] mod barium swallow 6/23/16:       1. Tracheal aspiration with thin liquid and nectar consistencies. Residual transient laryngeal penetration with chin tuck and turning the head to the left maneuvers.   Isolated dysphagia without signs otherwise suggestive of a neuromuscular or movement disorder"    Extensive medical record regarding complex swallowing history over almost a decade.     May need neuro eval again -refer back to neuro, concern for neuromuscular disorder. Also has family member with myasthenia gravis     vocal fold with pseudosulcus- can be seen with LPR but " only 60% specific for that, has seen GI    F/u after swallow study     I spent a total of 31 minutes on the day of the visit.  This includes face to face time and non-face to face time preparing to see the patient (eg, review of tests), obtaining and/or reviewing separately obtained history, documenting clinical information in the electronic or other health record, independently interpreting results and communicating results to the patient/family/caregiver, or care coordinator.   Please be aware that this note has been generated with the assistance of MModal voice-to-text.  Please excuse any spelling or grammatical errors.

## 2023-09-26 ENCOUNTER — PATIENT MESSAGE (OUTPATIENT)
Dept: NEUROLOGY | Facility: CLINIC | Age: 83
End: 2023-09-26
Payer: MEDICARE

## 2023-09-28 LAB — ACID FAST SUSCEPTIBILITY, SLOW GROWER: NORMAL

## 2023-10-03 ENCOUNTER — PATIENT MESSAGE (OUTPATIENT)
Dept: FAMILY MEDICINE | Facility: CLINIC | Age: 83
End: 2023-10-03
Payer: MEDICARE

## 2023-10-05 LAB — M TB DNA SPEC QL NAA+PROBE: ABNORMAL

## 2023-10-05 RX ORDER — SODIUM CHLORIDE FOR INHALATION 7 %
VIAL, NEBULIZER (ML) INHALATION
Qty: 500 ML | Refills: 11 | Status: SHIPPED | OUTPATIENT
Start: 2023-10-05 | End: 2024-03-18 | Stop reason: SDUPTHER

## 2023-10-09 ENCOUNTER — LAB VISIT (OUTPATIENT)
Dept: LAB | Facility: HOSPITAL | Age: 83
End: 2023-10-09
Attending: INTERNAL MEDICINE
Payer: MEDICARE

## 2023-10-09 DIAGNOSIS — A31.0 MYCOBACTERIUM AVIUM-INTRACELLULARE COMPLEX: ICD-10-CM

## 2023-10-09 PROCEDURE — 87186 SC STD MICRODIL/AGAR DIL: CPT

## 2023-10-09 PROCEDURE — 87015 SPECIMEN INFECT AGNT CONCNTJ: CPT | Performed by: INTERNAL MEDICINE

## 2023-10-09 PROCEDURE — 87118 MYCOBACTERIC IDENTIFICATION: CPT

## 2023-10-09 PROCEDURE — 87118 MYCOBACTERIC IDENTIFICATION: CPT | Mod: 59 | Performed by: INTERNAL MEDICINE

## 2023-10-09 PROCEDURE — 87118 MYCOBACTERIC IDENTIFICATION: CPT | Performed by: INTERNAL MEDICINE

## 2023-10-09 PROCEDURE — 87116 MYCOBACTERIA CULTURE: CPT | Performed by: INTERNAL MEDICINE

## 2023-10-09 PROCEDURE — 87150 DNA/RNA AMPLIFIED PROBE: CPT

## 2023-10-09 PROCEDURE — 87206 SMEAR FLUORESCENT/ACID STAI: CPT | Performed by: INTERNAL MEDICINE

## 2023-10-16 ENCOUNTER — CLINICAL SUPPORT (OUTPATIENT)
Dept: REHABILITATION | Facility: HOSPITAL | Age: 83
End: 2023-10-16
Attending: OTOLARYNGOLOGY
Payer: MEDICARE

## 2023-10-16 DIAGNOSIS — R13.10 DYSPHAGIA, UNSPECIFIED TYPE: ICD-10-CM

## 2023-10-16 DIAGNOSIS — R13.12 OROPHARYNGEAL DYSPHAGIA: Primary | ICD-10-CM

## 2023-10-16 DIAGNOSIS — R09.89 THROAT CLEARING: ICD-10-CM

## 2023-10-16 PROCEDURE — 92612 ENDOSCOPY SWALLOW (FEES) VID: CPT | Mod: PO | Performed by: SPEECH-LANGUAGE PATHOLOGIST

## 2023-10-16 PROCEDURE — 92610 EVALUATE SWALLOWING FUNCTION: CPT | Mod: 59,PO | Performed by: SPEECH-LANGUAGE PATHOLOGIST

## 2023-10-16 RX ORDER — PANTOPRAZOLE SODIUM 40 MG/1
40 TABLET, DELAYED RELEASE ORAL DAILY
Qty: 30 TABLET | Refills: 2 | Status: SHIPPED | OUTPATIENT
Start: 2023-10-16 | End: 2024-01-16

## 2023-10-16 NOTE — PLAN OF CARE
[facility]  Fiberoptic Endoscopic Evaluation of Swallowing (FEES)    Date: 10/16/2023     Name: Diego Gunter   MRN: [id number]    Therapy Diagnosis:   Encounter Diagnoses   Name Primary?    Dysphagia, unspecified type     Throat clearing     Oropharyngeal dysphagia Yes      Physician: Ayesha Hall MD  Physician Orders: [id number] - AMB REFERRAL/CONSULT TO SPEECH THERAPY; Clinical Swallowing Evaluation; FEES   Order Date: 9/25/23   Medical Diagnosis from Referral:   R13.10 (ICD-10-CM) - Dysphagia, unspecified type   R09.89 (ICD-10-CM) - Throat clearing     Visit #/Visits authorized: 1/ 1  Date of Evaluation:  10/16/2023   Insurance Authorization Period:  09/25/2023 - 09/24/2024  Plan of Care Expiration: 10/16/23 to 12/11/23    Time In: 9:51 AM  Time Out: 10:56 AM    Procedure Min.   Flexible fiberoptic endoscopic evaluation of  swallowing by cine or video recording (CPT 50906) 10   Swallow and oral function evaluation (CPT 94200) 55      Precautions:Standard, Fall, and Dysphagia  Subjective   Date of Onset: S/p operation (OSTERIOR CERVICAL LAMINECTOMY) in 2022  History of Current Condition:  Mr. Diego Gunter was referred by Dr. Hall for a FEES (CPT 89288) to objectively assess anatomy and physiology of the pharyngeal swallow, rule out silent aspiration, determine the safest possible diet, and examine the effectiveness of compensatory strategies. Patient's current nutritional avenue is oral. Patient is currently on a thin liquid diet consistency; regular food diet consistency. He presents with coughing, difficulty swallowing solids, difficulty swallowing liquids, and food getting stuck during the swallow. Requires liquid wash with solids. Endorses coughing and choking. Denies pneumonia and weight loss. Recently diagnosed with GERD; manages via medication. Patient's wife endorses low vocal intensity. Enodrses long term dysphagia that was remediated via therapy and that  ""now the dysphagia is back."    In consideration of the interrelationships between body systems and swallowing, History was provided by patient, family, and/or taken from chart review. The following are medical conditions present in the patient's history which can result in or be attributed to dysphagia:  Respiratory None noted in this category   Cardiovascular None noted in this category   Digestive GERD   Infections  None noted in this category   Urinary None noted in this category   Endocrine None noted in this category   Nervous None noted in this category   Skeletal None noted in this category   Immune None noted in this category   Cancer Prostate cancer history - 2011   Psychiatric  None noted in this category   Congenital  None noted in this category   Other None noted in this category     The following observations were made:   -Mental status: Alert and Cooperative  -Factors affecting performance: no difficulties participating in the study  -Feeding Method: independent in self-feeding    Respiratory Status: room air    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), Hard of hearing, colonic polyps, colonic polyps, Hyperlipidemia associated with type 2 diabetes mellitus, Hyperlipidemia LDL goal <100, Hypernatremia (05/05/2022), Hypertension associated with type 2 diabetes mellitus, Hypertension associated with type 2 diabetes mellitus, Hypotension, Intermittent confusion (04/29/2022), Overweight(278.02), Prostate cancer (09/2011), Pulmonary embolism, Reducible right inguinal hernia (10/10/2022), Right hemiparesis (07/22/2022), Type II or unspecified type diabetes " mellitus without mention of complication, not stated as uncontrolled, and Urinary retention (04/29/2022).  Diego Gunter  has a past surgical history that includes Prostatectomy; Cataract extraction, bilateral (2014); Bronchoscopy (N/A, 10/15/2018); Posterior cervical laminectomy (Bilateral, 4/28/2022); repair, hernia, inguinal, without history of prior repair, age 5 years or older (Right, 10/10/2022); decompression, nerve, ulnar (Right, 1/11/2023); Carpal tunnel release (Right, 1/11/2023); Esophagogastroduodenoscopy (N/A, 5/16/2023); and Colonoscopy (N/A, 5/16/2023).  Medical Hx and Allergies:  Diego has a current medication list which includes the following prescription(s): albuterol, arikayce, aspirin, azelastine, b complex vitamins, inv clofazimine, nebulizer and compressor, pantoprazole, pravastatin, sodium chloride 3%, sodium chloride 7%, and [DISCONTINUED] omega-3 fatty acids. Review of patient's allergies indicates:  No Known Allergies  Past Surgical History:   Procedure Laterality Date    BRONCHOSCOPY N/A 10/15/2018    Procedure: BRONCHOSCOPY;  Surgeon: [facility] Diagnostic Provider;  Location: [facility] 2ND FLR;  Service: Anesthesiology;  Laterality: N/A;    CARPAL TUNNEL RELEASE Right 1/11/2023    Procedure: RELEASE, CARPAL TUNNEL;  Surgeon: Romero Lazo MD;  Location: [facility] OR;  Service: Orthopedics;  Laterality: Right;    CATARACT EXTRACTION, BILATERAL  2014    COLONOSCOPY N/A 5/16/2023    Procedure: COLONOSCOPY;  Surgeon: Buster Sequeira MD;  Location: [facility] (4TH FLR);  Service: Endoscopy;  Laterality: N/A;    DECOMPRESSION, NERVE, ULNAR Right 1/11/2023    Procedure: DECOMPRESSION, NERVE, ULNAR - right cub jolie and guyon's decompression as well as open right carpal tunnel release;  Surgeon: Romero Lazo MD;  Location: [facility] OR;  Service: Orthopedics;  Laterality: Right;    ESOPHAGOGASTRODUODENOSCOPY N/A 5/16/2023    Procedure: EGD (ESOPHAGOGASTRODUODENOSCOPY);  Surgeon: Buster Sequeira MD;   "Location: [facility] ENDO (4TH FLR);  Service: Endoscopy;  Laterality: N/A;  inst mailed-RB  5/10/23 no answer for precall/mleone lpn    POSTERIOR CERVICAL LAMINECTOMY Bilateral 4/28/2022    Procedure: LAMINECTOMY, SPINE, CERVICAL, POSTERIOR APPROACH C3-6;  Surgeon: Carlos Miller MD;  Location: [facility] OR 2ND FLR;  Service: Neurosurgery;  Laterality: Bilateral;    PROSTATECTOMY      REPAIR, HERNIA, INGUINAL, WITHOUT HISTORY OF PRIOR REPAIR, AGE 5 YEARS OR OLDER Right 10/10/2022    Procedure: REPAIR, HERNIA, INGUINAL, WITHOUT HISTORY OF PRIOR REPAIR, AGE 5 YEARS OR OLDER;  Surgeon: Dario Esquivel MD;  [facility];  Service: General;  Laterality: Right;  open right inguinal hernia repair with mesh      Imaging: Esophagram 9/18/23:"Impression:     Weakened pharyngeal constriction.     Mild elicited gastroesophageal reflux."  Pneumonia History: No per patient report and per family report  Previous MBSS:   Yes 5/5/22: " Patient demonstrates mild pharyngeal dysphagia characterized by flash penetration of thin liquids and moderate residuals post swallow.  Use of small bolus size partially effective in reducing penetration risk. Use of throat clear and multiple swallows partially effective in clearing residuals.  Patient remains at risk of penetration/aspiration of solid residuals following the swallow. Safest means for nutrition at this time would be mechanically soft/chopped textures, (avoid mixed consistencies), thin liquids, medications crushed in puree, provided patient uses small, single bites/sips, double swallows, upright with all PO, minimal distractions, avoid talking while eating and remain upright for at least 30 minutes following PO; however, risk of aspiration remains 2/2 decreased endurance, cognition and fatigue.  "  Previous FEES:   No  Prior Therapy:  Patient has participated in various speech therapy plans of care across 2 outpatient clinics within the past 10 years  Social History: Patient " lives with his wife - he is retired.   Pain:   0/10  Pain Location / Description: none  Patient's Therapy Goals:  assess swallowing  Objective     Procedure: A flexible fiberoptic nasoendoscope was passed transnasally to view the nasopharynx, oropharynx, hypopharynx, and larynx. 24 swallow trials using 1/2 teaspoon to 3 ounce amounts of real foods of thin liquid, nectar-thick liquid, puree, soft-mechanical, and solid consistencies were video recorded and analyzed using transnasal endoscopy. A clinical swallow evaluation (CPT 85067) was completed in conjunction with the FEES in order to evaluate the oral structures required for functional swallowing.   Scope Time: 10 minutes 20 seconds     Results revealed:   Cranial Nerve Examination  Cranial Nerve 5: Trigeminal Nerve  Motor Jaw Posture at rest: Closed  Mandible Elevation/Depression: WFL  Mandible lateralization: decreased range of motion   Abnormal movement: absent Interpretation: unable to rule out cranial nerve involvement however functional or speech and mastication   Sensory Forehead: WFL  Cheek: WFL  Jaw: WFL  Facial Pain: None noted Interpretation: within functional limits      Cranial Nerve 7: Facial Nerve  Motor Facial Symmetry: mask-like expression  Wrinkle Forehead: WFL  Close eyes tightly: WFL  Labial Protrusion: WFL  Labial Retraction: Decreased ROM  Buccal Strength with Labial Seal: WFL  Abnormal movement: absent Interpretation: unable to rule out cranial nerve involvement however functional or speech and mastication   Sensory Formal testing not completed. Pt denied any changes in taste      Cranial Nerves IX and X: Glossopharyngeal and Vagus Nerves  Motor Palatal Symmetry (Rest): WNL  Palatal Symmetry (Movement): WNL  Cough: Perceptually strong  Voice Prior to PO intake: hoarse  Resonance: Normal  Abnormal movement: absent Interpretation: within functional limits      Cranial Nerve XII: Hypoglossal Nerve  Motor Tongue at rest: WNL  Lingual Protrusion:  WNL  Lingual Protrusion against Resistance: WNL  Lingual Lateralization: Decreased ROM  Abnormal movement: present Interpretation: unable to rule out cranial nerve involvement however functional or speech and mastication     Other information:  Volitional Swallow: Able to palpate laryngeal rise  Mucosal Quality: No abnormal findings  Secretion Management: within functional limits   Dentition: Upper dentures    Nasopharyngoscopic, pharyngoscopic, and laryngeal findings:  Nasopharyngoscopic Findings Velopharyngeal function WFL    Anatomic findings WNL   Pharyngoscopic & laryngoscopic findings Secretions Within normal limits    Alanis Secretion Scale 0: Most normal rating. No visible secretions anywhere in the hypopharynx or some transient bubble visible in the valleculae and pyriform sinuses.    Vocal fold motion WFL- complete bilateral vocal fold abduction/adduction    Sensory integrity Appears functional- swallow initiated to penetration material, cough or throat clear to aspiration and/or spontaneous swallow to significant residue however cue to complete additional cleansing swallows to fully clear pharyngeal residue was required at times    Anatomic findings Within normal limits     Consistency  Findings Rosenbeck's Penetration/Aspiration Scale (PAS) Strategies   Thin (IDDSI 0)    1/2 teaspoon x1  Full teaspoon x4  Self-regulated cup sip x3   Consecutive cup sip x0   Self-regulated straw sip x3   Consecutive straw sip x1 Vallecular residue: trace to mild residue bilaterally    Pyriform sinus residue: trace to mild residue bilaterally    Other residue: scattered pharyngeal residue  Best: (1) Material does not enter the airway    Worst: (2) Material enters the airway, remains above the vocal folds, and is ejected from the airway  Penetration occurred before and after the swallow over rim of the epiglottis    *inconsistent epiglottic inversion noted  Independent cleansing swallow performed x1-3 which often was  successful in reducing residue to trace amounts. Patient required cue to perform additional cleansing swallow on 2 trials    Nectar thick (mildly thick/IDDSI 2)    1/2 teaspoon x1  Full teaspoon x2 Vallecular residue: mild to moderate residue bilaterally    Pyriform sinus residue: mild to moderate residue bilaterally    Other residue: none present Best: (1) Material does not enter the airway    Worst: (3) Material enters the airway, remains above the vocal folds, and is not ejected from the airway  Penetration occurred before and during the swallow over rim of the epiglottis  *inconsistent epiglottic inversion noted    Independent cleansing swallow performed x3 which was successful in reducing residue to trace amounts. Patient required cue to perform additional cleansing swallow on 1 trial   Puree (extremely thick/IDDSI 4)    1/2 teaspoon x1  Full teaspoon x2  Heaping Teaspoon x0 Vallecular residue: moderate to severe residue bilaterally    Pyriform sinus residue: trace to severe residue residue bilaterally    Other residue: scattered residue  Best: (2) Material enters the airway, remains above the vocal folds, and is ejected from the airway  Penetration occurred before the swallow over rim of the epiglottis    Worst: (3) Material enters the airway, remains above the vocal folds, and is not ejected from the airway  Penetration occurred before the swallow over interarytenoid space    *inconsistent epiglottic inversion noted  Independent cleansing swallow performed x3 which was successful in reducing residue - ultimately liquid wash x 1 required to reduce residue to trace amounts.     Hard effortful swallow mildly effective in clearing residuals.   Attempted chin tuck to clear vallecular residuals - mildly effective   Mixed consistency (thin/ IDDSI 0 + soft and bite sized/ IDDSI 6)    - 1 peach in juice x1  - 2 peaches in juice x2 Vallecular residue: mild to moderate residue bilaterally    Pyriform sinus residue: mild  to moderate residue bilaterally    Other residue: none present Best: (1) Material does not enter the airway    Worst: (3) Material enters the airway, remains above the vocal folds, and is not ejected from the airway  Penetration occurred during and after the swallow over rim of the epiglottis and interarytenoid space    *inconsistent epiglottic inversion noted  Independent cleansing swallow performed x3-4 which was successful in reducing residue - ultimately liquid wash x 1 required to reduce residue to trace amounts. Hard effortful swallow mildly effective in clearing residuals.    Solid (regular/ IDDSI 7)    - bite of cookie x2 Vallecular residue: severe residue bilaterally    Pyriform sinus residue: severe residue bilaterally    Other residue: none present Best: (2) Material enters the airway, remains above the vocal folds, and is ejected from the airway  Penetration occurred after the swallow over interarytenoid space    Worst: (3) Material enters the airway, remains above the vocal folds, and is not ejected from the airway  Penetration occurred after the swallow over rim of the epiglottis    *inconsistent epiglottic inversion noted  Independent cleansing swallow performed x3-4 which was successful in reducing residue - ultimately liquid wash x 1 required to reduce residue to trace amounts.  Hard effortful swallow mildly effective in clearing residuals.      EAT-10 Score:    Eating Assessment Tool (EAT-10) is a questionnaire given to the patient to  his swallowing function. This assessment is used to document the initial dysphagia severity and monitor the treatment response in persons with a wide array of swallowing disorders.    Key: 0= no problem; 4= severe problem  1. My swallowing problem has caused me to lose weight. - 0  2. My swallowing problem interferes with my ability to go out for meals. - 0  3. Swallowing liquids takes extra effort. - 0  4. Swallowing solids takes extra effort. - 2  5.  Swallowing pills takes extra effort. - 0  6. Swallowing is painful. - 0  7. The pleasure of eating is affected by my swallowing. - 0  8. When I swallow food sticks in my throat. - 2  9. I cough when I eat. - 4  10. Swallowing is stressful. - 0    Diego ranked his swallowing function as a 8/40     Interpretation: Score of greater than 3 is indicative of a swallowing disorder (Lauren et al., 2008); higher scores correlate with increased penetration-aspiration  scale scores, and scores greater than 15 results in the patient being 2.2 times more likely to aspirate (Mirza et al., 2015)    References:   MAYELIN Aguilar., BERTO Parry., CHUCKY Emanuel., OBINNA Pittman., LULÚ Quiroz., OBINNA Leija, & CHARBEL Casas. (2008). Validity and reliability of the Eating Assessment Tool (EAT-10). Annals of Otology, Rhinology & Laryngology, 117(12), 919-924. [email or web address]  BERTO Blue., CESARIO Mercer., OBINNA Mcrae., Vignesh, M. A., & MAYELIN Aguilar. (2015). The ability of the 10-item eating assessment tool (EAT-10) to predict aspiration risk in persons with dysphagia. Annals of Otology, Rhinology & Laryngology, 124(5), 351-354. [email or web address]/[email or web address]     Reflux Severity Index:  The Reflux Severity Index (RSI) was completed to assess the possible presence and/or severity of laryngopharyngeal reflux symptoms and any relationship between this condition and the patient's dysphagia. A score of greater than or equal to 15 may indicate laryngopharyngeal reflux. Score 0-5 (0=no problem, 1=very mild, 2=moderate or slight, 3=moderate, 4=severe, & 5=problem as bad as it can be)  1. Hoarseness or a problem with your voice. 0  2. Clearing your throat. 3  3. Excess throat mucous or post-nasal drip. 3  4. Difficulty swallowing food, liquids or pills. 3  5. Coughing after you ate or after lying down. 0  6. Breathing difficulties or choking episodes. 0  7. Troublesome or annoying cough. 3  8. Sensations of  something sticking in your throat. 3  9. Heartburn, chest pain, indigestion, or stomach acid coming up. 0  Patient completed with a result of  15/45    Recommend MBSS: No    Treatment   Treatment Time In: n/a  Treatment Time Out: n/a  Total Treatment Time: n/a  No treatment performed 2/2 time to administer evaluation    Education: Plan of Care, role of SLP in care, aspiration precautions , and course of medical disease affect on therapy diagnosis  were discussed with the patient. Additionally, SLP and patient/patient's wife discussed the pillars of aspiration related pneumonia. According to Dr. Neville Alcantar (2005), there are 3 pillars that contribute to aspiration related pneumonia. The Three Pillars of Aspiration Pneumonia is research showing that aspiration pneumonia only develops when three factors are present: aspiration, compromised immune system, poor oral hygiene. If all three factors are not present, risk of aspiration developing into pneumonia is very minimal, meaning that risk of dehydration and malnutrition is greater than pneumonia. Please see research articles below for reference or feel free to contact me regarding any questions or concerns. Dr. Kelly Lozano and colleagues (1998) also concluded the number one predictor of aspiration pneumonia is being dependent on others for provision of oral care. Therefore, oral care was discussed at length with the patient. Recommended: Aggressive oral care at least three times daily is strongly recommended to reduce bacteria on oropharyngeal surfaces which may increase the risk of nosocomial infections, including pneumonia. Advantages and disadvantages of thickened liquids discussed with the patient. Advantages discussed included prevention of aspiration, easier to control, and may reduce uncomfortable coughing while drinking. Disadvantages discussed included reduced quality of life due to inability to drink your favorite drinks without thickener (Rhonda Medina,  Eb, Nathan, & Jorge 2002; Ely Herman, & Jesenia 2005), Dehydration, because people often dislike thickened liquids and drink less (Francis 2001), More throat residue, because the muscles of your throat need to squeeze harder to clear thickened liquids (Kalyan et al. 2008), and If aspirated, thickened liquids are more likely to cause pneumonia than thin liquids. This is especially a concern if you have a large amount of residue in your throat after swallowing (Nathan et al. 2008; Yassine et al. 2017).  Patient and family members expressed understanding of all discussed.     Home Program: TBD  Assessment   Diego is a 83 y.o. male referred to outpatient Speech Therapy with a medical diagnosis of Dysphagia, unspecified type and throat clearing. Results of this FEES revealted that the pt presents with moderate oropharyngeal dysphagia  as defined by the dysphagia outcome and severity scale (adapted for FEES by OLGA Alacntar,  Swallowing Services, [facility] from O'Jake et al 1999).    Oral phase findings Posterior containment Within normal limits    Mastication Within normal limits    Clearance Within normal limits   Pharyngeal phase findings Initiation of the swallow mildy delayed however this is a variation of normal given the patient's age    Base of tongue retraction Moderately  impaired    Epiglottic movement Partial Movement - inconsistent    Pharyngeal contraction Moderately to severely reduced pharyngeal wall contraction    Laryngeal vestibule closure Functional    PES opening Within normal limits    Other findings Multiple spontaneous swallows per bolus      Moderate oropharyngeal dysphagia, likely chronic related to previously documented oropharyngeal dysphagia in 2016. Swallow safety is preserved; however, swallow efficiency is impaired.. Patient appears to be at low risk for aspiration related pneumonia from a primary oropharyngeal dysphagia in consideration of three pillars of aspiration  pneumonia (Ortiz, 2005) including oral health status, overall health/immune status, and laryngeal vestibule closure/severity of dysphagia.  However, unable to assess risk related to aspiration pneumonia cause by the aspiration of gastric content. Patient at low risk for malnutrition/dehydration. Patient appears to be a good candidate for behavioral swallow rehabilitation.     Consistency Recommendations: Thin liquids (IDDSI 0) and soft and bite sized consistencies (IDDSI 6).  Avoid mixed consistencies.     Functional Oral Intake Scale (FOIS)  The Functional Oral Intake Scale (FOIS) is an ordinal scale that is used to assess the current status and meaningful change in the oral intake. FOIS levels include:    TUBE DEPENDENT (levels 1-3) 1. No oral intake  2. Tube dependent with minimal/inconsistent oral intake  3. Tube supplements with consistent oral intake      TOTAL ORAL INTAKE (levels 4-7) 4. Total oral intake of a single consistency  5. Total oral intake of multiple consistencies requiring special preparation  6. Total oral intake with no special preparation, but must avoid specific foods or liquid items  7. Total oral intake with no restrictions     Patient is currently judged to be at FOIS level 6.      Demonstrates impairments including limitations as described in the problem list. Positive prognostic factors include patient motivation and family support. Negative prognostic factors include complex medical history.No barriers to therapy identified. Patient will benefit from skilled, outpatient dysphagia therapy.    Rehab Potential: fair to good  Pt's spiritual, cultural and educational needs considered and pt agreeable to plan of care and goals.    Short Term Goals (4 weeks):   Patient will participate in tongue base retraction exercises (I.e., hard effortful swallow) 75-95 times given minimum cues to improve tongue base retraction and overall swallow function.   Patient will participate in chin tuck against  resistance (CTAR) hold for 60 seconds for 3 sets given minimum cues to improve hyolaryngeal elevation / excursion and overall swallow function.  Patient will participate in chin tuck against resistance (CTAR) repetitions 30-45 times given minimum cues to improve hyolaryngeal elevation / excursion and overall swallow function.  Patient will recall and utilize aspiration precautions and safe swallow strategies independently     Long Term Goals (8 weeks):   1. He  will maintain adequate hydration/nutrition with optimum safety and efficiency of swallowing function on PO intake without overt signs and symptoms of aspiration for The International Dysphagia Diet Standardisation Initiative level 0 / The International Dysphagia Diet Standardisation Initiative level 7.       2. He  will utilize compensatory strategies with optimum safety and efficiency of swallowing function on PO intake without overt signs and symptoms of aspiration for The International Dysphagia Diet Standardisation Initiative level 0 / The International Dysphagia Diet Standardisation Initiative level 7.    Plan   Plan of Care Certification: 10/16/2023  to 12/11/23    Recommended Treatment Plan: Patient will participate in the [facility] neurological rehabilitation program for speech therapy 2 times per week to address his Swallow deficits, to educate patient and their family, and to participate in a home exercise program.     Other Recommendations:   - Aggressive oral care at least twice daily (morning and bedtime) is strongly recommended to reduce bacteria on oropharyngeal surfaces which may increase the risk of nosocomial infections, including pneumonia.   - Monitor for any signs/symptoms of aspiration (such as wet/gurgly voice that does not clear with coughing, inability to make any voice sounds, any persistent coughing with oral intake, otherwise unexplained fever, unexplained increased or new difficulty or discomfort breathing, unexplained increase in  sleepiness/lethargy/significant fatigue, unexplained increase or new onset confusion or change in cognitive functioning, or any other unexplained change in health or well-being that could be related to swallowing).  - Risk Management: use good oral hygiene , sit upright for all PO intake, increase physical mobility as tolerated, behavioral reflux precautions, feed only when wake and alert, alternate bites and sips, small bites and sips, multiple swallows per bolus, allow extra time for meals, remain upright for at least 2 hours following any PO intake, and encourage volitional dry swallows and coughs throughout meals  -Specialist Referrals: n/a  -Ancillary Tests: n/a.  -Follow-up exam: Follow up instrumental assessment of the swallow should be completed at the recommendation of the treating clinician.    Therapist's Name:   BRAEDEN Lowery L-SLP, CCC-SLP  Speech Language Pathologist   Date: 10/16/2023

## 2023-10-16 NOTE — TELEPHONE ENCOUNTER
----- Message from Krystal Vargas sent at 10/16/2023  2:30 PM CDT -----  Type: RX Refill Request    Who Called: Addis/ Wife     Have you contacted your pharmacy:Yes     Refill or New Rx:REFILL     RX Name and Strength:pantoprazole (PROTONIX) 40 MG tablet 30 tablet     Preferred Pharmacy with phone number:[id number] - [place] - [address] [facility] [address]   Phone:  [phone]  Fax:  [phone]      Local or Mail Order:Local     Would the patient rather a call back or a response via [facility]? CALL     Best Call Back Number: [phone] (home)

## 2023-10-16 NOTE — Clinical Note
Hi Dr. Hall - thanks for this referral! I did identify moderate dysphagia - airway protection was pretty good but swallow peristalsis was moderately to severely impaired. I recommended 2/xweek for 8 weeks dysphagia therapy and they opted to start at the [place] location so I will get him scheduled there.  If you are in agreement with this plan, please place another order to speech therapy for dysphagia therapy!

## 2023-10-16 NOTE — PATIENT INSTRUCTIONS
Recommendations:   - Aggressive oral care at least twice daily (morning and bedtime) is strongly recommended to reduce bacteria on oropharyngeal surfaces which may increase the risk of nosocomial infections, including pneumonia.   - Monitor for any signs/symptoms of aspiration (such as wet/gurgly voice that does not clear with coughing, inability to make any voice sounds, any persistent coughing with oral intake, otherwise unexplained fever, unexplained increased or new difficulty or discomfort breathing, unexplained increase in sleepiness/lethargy/significant fatigue, unexplained increase or new onset confusion or change in cognitive functioning, or any other unexplained change in health or well-being that could be related to swallowing).  - Risk Management: use good oral hygiene , sit upright for all PO intake, increase physical mobility as tolerated, behavioral reflux precautions, feed only when wake and alert, alternate bites and sips, small bites and sips, multiple swallows per bolus, allow extra time for meals, remain upright for at least 2 hours following any PO intake, and encourage volitional dry swallows and coughs throughout meals

## 2023-10-16 NOTE — PROGRESS NOTES
[facility]  Fiberoptic Endoscopic Evaluation of Swallowing (FEES)    Date: 10/16/2023     Name: Diego Gunter   MRN: [id number]    Therapy Diagnosis:   Encounter Diagnoses   Name Primary?    Dysphagia, unspecified type     Throat clearing     Oropharyngeal dysphagia Yes      Physician: Ayesha Hall MD  Physician Orders: [id number] - AMB REFERRAL/CONSULT TO SPEECH THERAPY; Clinical Swallowing Evaluation; FEES   Order Date: 9/25/23   Medical Diagnosis from Referral:   R13.10 (ICD-10-CM) - Dysphagia, unspecified type   R09.89 (ICD-10-CM) - Throat clearing     Visit #/Visits authorized: 1/ 1  Date of Evaluation:  10/16/2023   Insurance Authorization Period:  09/25/2023 - 09/24/2024  Plan of Care Expiration:  10/16/23 to 12/11/23    Time In: 9:51 AM  Time Out: 10:56 AM    Procedure Min.   Flexible fiberoptic endoscopic evaluation of  swallowing by cine or video recording (CPT 99319) 10   Swallow and oral function evaluation (CPT 90323) 55      Precautions:Standard, Fall, and Dysphagia  Subjective   Date of Onset: S/p operation (OSTERIOR CERVICAL LAMINECTOMY) in 2022  History of Current Condition:  Mr. Diego Gunter was referred by Dr. Hall for a FEES (CPT 44226) to objectively assess anatomy and physiology of the pharyngeal swallow, rule out silent aspiration, determine the safest possible diet, and examine the effectiveness of compensatory strategies. Patient's current nutritional avenue is oral. Patient is currently on a thin liquid diet consistency; regular food diet consistency. He presents with coughing, difficulty swallowing solids, difficulty swallowing liquids, and food getting stuck during the swallow. Requires liquid wash with solids. Endorses coughing and choking. Denies pneumonia and weight loss. Recently diagnosed with GERD; manages via medication. Patient's wife endorses low vocal intensity. Enodrses long term dysphagia that was remediated via therapy and that  ""now the dysphagia is back."    In consideration of the interrelationships between body systems and swallowing, History was provided by patient, family, and/or taken from chart review. The following are medical conditions present in the patient's history which can result in or be attributed to dysphagia:  Respiratory None noted in this category   Cardiovascular None noted in this category   Digestive GERD   Infections  None noted in this category   Urinary None noted in this category   Endocrine None noted in this category   Nervous None noted in this category   Skeletal None noted in this category   Immune None noted in this category   Cancer Prostate cancer history - 2011   Psychiatric  None noted in this category   Congenital  None noted in this category   Other None noted in this category     The following observations were made:   -Mental status: Alert and Cooperative  -Factors affecting performance: no difficulties participating in the study  -Feeding Method: independent in self-feeding    Respiratory Status: room air    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), Hard of hearing, colonic polyps, colonic polyps, Hyperlipidemia associated with type 2 diabetes mellitus, Hyperlipidemia LDL goal <100, Hypernatremia (05/05/2022), Hypertension associated with type 2 diabetes mellitus, Hypertension associated with type 2 diabetes mellitus, Hypotension, Intermittent confusion (04/29/2022), Overweight(278.02), Prostate cancer (09/2011), Pulmonary embolism, Reducible right inguinal hernia (10/10/2022), Right hemiparesis (07/22/2022), Type II or unspecified type diabetes " mellitus without mention of complication, not stated as uncontrolled, and Urinary retention (04/29/2022).  Diego Gunter  has a past surgical history that includes Prostatectomy; Cataract extraction, bilateral (2014); Bronchoscopy (N/A, 10/15/2018); Posterior cervical laminectomy (Bilateral, 4/28/2022); repair, hernia, inguinal, without history of prior repair, age 5 years or older (Right, 10/10/2022); decompression, nerve, ulnar (Right, 1/11/2023); Carpal tunnel release (Right, 1/11/2023); Esophagogastroduodenoscopy (N/A, 5/16/2023); and Colonoscopy (N/A, 5/16/2023).  Medical Hx and Allergies:  Diego has a current medication list which includes the following prescription(s): albuterol, arikayce, aspirin, azelastine, b complex vitamins, inv clofazimine, nebulizer and compressor, pantoprazole, pravastatin, sodium chloride 3%, sodium chloride 7%, and [DISCONTINUED] omega-3 fatty acids. Review of patient's allergies indicates:  No Known Allergies  Past Surgical History:   Procedure Laterality Date    BRONCHOSCOPY N/A 10/15/2018    Procedure: BRONCHOSCOPY;  Surgeon: [facility];  Location: [facility] 2ND FLR;  Service: Anesthesiology;  Laterality: N/A;    CARPAL TUNNEL RELEASE Right 1/11/2023    Procedure: RELEASE, CARPAL TUNNEL;  Surgeon: Romero Lazo MD;  Location: [facility] OR;  Service: Orthopedics;  Laterality: Right;    CATARACT EXTRACTION, BILATERAL  2014    COLONOSCOPY N/A 5/16/2023    Procedure: COLONOSCOPY;  Surgeon: Buster Sequeira MD;  Location: [facility] (4TH FLR);  Service: Endoscopy;  Laterality: N/A;    DECOMPRESSION, NERVE, ULNAR Right 1/11/2023    Procedure: DECOMPRESSION, NERVE, ULNAR - right cub jolie and guyon's decompression as well as open right carpal tunnel release;  Surgeon: Romero Lazo MD;  Location: [facility] OR;  Service: Orthopedics;  Laterality: Right;    ESOPHAGOGASTRODUODENOSCOPY N/A 5/16/2023    Procedure: EGD (ESOPHAGOGASTRODUODENOSCOPY);  Surgeon: Buster Sequeira MD;   "Location: [facility] ENDO (4TH FLR);  Service: Endoscopy;  Laterality: N/A;  inst mailed-RB  5/10/23 no answer for precall/mleone lpn    POSTERIOR CERVICAL LAMINECTOMY Bilateral 4/28/2022    Procedure: LAMINECTOMY, SPINE, CERVICAL, POSTERIOR APPROACH C3-6;  Surgeon: Carlos Miller MD;  Location: [facility] OR 2ND FLR;  Service: Neurosurgery;  Laterality: Bilateral;    PROSTATECTOMY      REPAIR, HERNIA, INGUINAL, WITHOUT HISTORY OF PRIOR REPAIR, AGE 5 YEARS OR OLDER Right 10/10/2022    Procedure: REPAIR, HERNIA, INGUINAL, WITHOUT HISTORY OF PRIOR REPAIR, AGE 5 YEARS OR OLDER;  Surgeon: Dario Esquivel MD;  [facility];  Service: General;  Laterality: Right;  open right inguinal hernia repair with mesh      Imaging: Esophagram 9/18/23:"Impression:     Weakened pharyngeal constriction.     Mild elicited gastroesophageal reflux."  Pneumonia History: No per patient report and per family report  Previous MBSS:   Yes 5/5/22: " Patient demonstrates mild pharyngeal dysphagia characterized by flash penetration of thin liquids and moderate residuals post swallow.  Use of small bolus size partially effective in reducing penetration risk. Use of throat clear and multiple swallows partially effective in clearing residuals.  Patient remains at risk of penetration/aspiration of solid residuals following the swallow. Safest means for nutrition at this time would be mechanically soft/chopped textures, (avoid mixed consistencies), thin liquids, medications crushed in puree, provided patient uses small, single bites/sips, double swallows, upright with all PO, minimal distractions, avoid talking while eating and remain upright for at least 30 minutes following PO; however, risk of aspiration remains 2/2 decreased endurance, cognition and fatigue.  "  Previous FEES:   No  Prior Therapy:  Patient has participated in various speech therapy plans of care across 2 outpatient clinics within the past 10 years  Social History: Patient " lives with his wife - he is retired.   Pain:   0/10  Pain Location / Description: none  Patient's Therapy Goals:  assess swallowing  Objective     Procedure: A flexible fiberoptic nasoendoscope was passed transnasally to view the nasopharynx, oropharynx, hypopharynx, and larynx. 24 swallow trials using 1/2 teaspoon to 3 ounce amounts of real foods of thin liquid, nectar-thick liquid, puree, soft-mechanical, and solid consistencies were video recorded and analyzed using transnasal endoscopy. A clinical swallow evaluation (CPT 32562) was completed in conjunction with the FEES in order to evaluate the oral structures required for functional swallowing.   Scope Time: 10 minutes 20 seconds     Results revealed:   Cranial Nerve Examination  Cranial Nerve 5: Trigeminal Nerve  Motor Jaw Posture at rest: Closed  Mandible Elevation/Depression: WFL  Mandible lateralization: decreased range of motion   Abnormal movement: absent Interpretation: unable to rule out cranial nerve involvement however functional or speech and mastication   Sensory Forehead: WFL  Cheek: WFL  Jaw: WFL  Facial Pain: None noted Interpretation: within functional limits      Cranial Nerve 7: Facial Nerve  Motor Facial Symmetry: mask-like expression  Wrinkle Forehead: WFL  Close eyes tightly: WFL  Labial Protrusion: WFL  Labial Retraction: Decreased ROM  Buccal Strength with Labial Seal: WFL  Abnormal movement: absent Interpretation: unable to rule out cranial nerve involvement however functional or speech and mastication   Sensory Formal testing not completed. Pt denied any changes in taste      Cranial Nerves IX and X: Glossopharyngeal and Vagus Nerves  Motor Palatal Symmetry (Rest): WNL  Palatal Symmetry (Movement): WNL  Cough: Perceptually strong  Voice Prior to PO intake: hoarse  Resonance: Normal  Abnormal movement: absent Interpretation: within functional limits      Cranial Nerve XII: Hypoglossal Nerve  Motor Tongue at rest: WNL  Lingual Protrusion:  WNL  Lingual Protrusion against Resistance: WNL  Lingual Lateralization: Decreased ROM  Abnormal movement: present Interpretation: unable to rule out cranial nerve involvement however functional or speech and mastication     Other information:  Volitional Swallow: Able to palpate laryngeal rise  Mucosal Quality: No abnormal findings  Secretion Management: within functional limits   Dentition: Upper dentures    Nasopharyngoscopic, pharyngoscopic, and laryngeal findings:  Nasopharyngoscopic Findings Velopharyngeal function WFL    Anatomic findings WNL   Pharyngoscopic & laryngoscopic findings Secretions Within normal limits    Alanis Secretion Scale 0: Most normal rating. No visible secretions anywhere in the hypopharynx or some transient bubble visible in the valleculae and pyriform sinuses.    Vocal fold motion WFL- complete bilateral vocal fold abduction/adduction    Sensory integrity Appears functional- swallow initiated to penetration material, cough or throat clear to aspiration and/or spontaneous swallow to significant residue however cue to complete additional cleansing swallows to fully clear pharyngeal residue was required at times    Anatomic findings Within normal limits     Consistency  Findings Rosenbeck's Penetration/Aspiration Scale (PAS) Strategies   Thin (IDDSI 0)    1/2 teaspoon x1  Full teaspoon x4  Self-regulated cup sip x3   Consecutive cup sip x0   Self-regulated straw sip x3   Consecutive straw sip x1 Vallecular residue:  trace to mild residue bilaterally    Pyriform sinus residue:  trace to mild residue bilaterally    Other residue:  scattered pharyngeal residue   Best: (1) Material does not enter the airway    Worst: (2) Material enters the airway, remains above the vocal folds, and is ejected from the airway  Penetration occurred before and after the swallow over rim of the epiglottis    *inconsistent epiglottic inversion noted  Independent cleansing swallow performed x1-3 which often was  successful in reducing residue to trace amounts. Patient required cue to perform additional cleansing swallow on 2 trials    Nectar thick (mildly thick/IDDSI 2)    1/2 teaspoon x1  Full teaspoon x2 Vallecular residue:  mild to moderate residue bilaterally    Pyriform sinus residue:  mild to moderate residue bilaterally    Other residue: none present Best: (1) Material does not enter the airway    Worst: (3) Material enters the airway, remains above the vocal folds, and is not ejected from the airway  Penetration occurred before and during the swallow over rim of the epiglottis  *inconsistent epiglottic inversion noted    Independent cleansing swallow performed x3 which was successful in reducing residue to trace amounts. Patient required cue to perform additional cleansing swallow on 1 trial   Puree (extremely thick/IDDSI 4)    1/2 teaspoon x1  Full teaspoon x2  Heaping Teaspoon x0 Vallecular residue:  moderate to severe residue bilaterally    Pyriform sinus residue:  trace to severe residue residue bilaterally    Other residue:  scattered residue   Best: (2) Material enters the airway, remains above the vocal folds, and is ejected from the airway  Penetration occurred before the swallow over rim of the epiglottis    Worst: (3) Material enters the airway, remains above the vocal folds, and is not ejected from the airway  Penetration occurred before the swallow over interarytenoid space    *inconsistent epiglottic inversion noted  Independent cleansing swallow performed x3 which was successful in reducing residue - ultimately liquid wash x 1 required to reduce residue to trace amounts.     Hard effortful swallow mildly effective in clearing residuals.   Attempted chin tuck to clear vallecular residuals - mildly effective   Mixed consistency (thin/ IDDSI 0 + soft and bite sized/ IDDSI 6)    - 1 peach in juice x1  - 2 peaches in juice x2 Vallecular residue:  mild to moderate residue bilaterally    Pyriform sinus  residue:  mild to moderate residue bilaterally    Other residue: none present Best: (1) Material does not enter the airway    Worst: (3) Material enters the airway, remains above the vocal folds, and is not ejected from the airway  Penetration occurred during and after the swallow over rim of the epiglottis and interarytenoid space    *inconsistent epiglottic inversion noted  Independent cleansing swallow performed x3-4 which was successful in reducing residue - ultimately liquid wash x 1 required to reduce residue to trace amounts. Hard effortful swallow mildly effective in clearing residuals.    Solid (regular/ IDDSI 7)    - bite of cookie x2 Vallecular residue:  severe residue bilaterally    Pyriform sinus residue:  severe residue bilaterally    Other residue: none present Best: (2) Material enters the airway, remains above the vocal folds, and is ejected from the airway  Penetration occurred after the swallow over interarytenoid space    Worst: (3) Material enters the airway, remains above the vocal folds, and is not ejected from the airway  Penetration occurred after the swallow over rim of the epiglottis    *inconsistent epiglottic inversion noted  Independent cleansing swallow performed x3-4 which was successful in reducing residue - ultimately liquid wash x 1 required to reduce residue to trace amounts.  Hard effortful swallow mildly effective in clearing residuals.      EAT-10 Score:    Eating Assessment Tool (EAT-10) is a questionnaire given to the patient to  his swallowing function. This assessment is used to document the initial dysphagia severity and monitor the treatment response in persons with a wide array of swallowing disorders.    Key: 0= no problem; 4= severe problem  1. My swallowing problem has caused me to lose weight. - 0  2. My swallowing problem interferes with my ability to go out for meals. - 0  3. Swallowing liquids takes extra effort. - 0  4. Swallowing solids takes extra effort.  - 2  5. Swallowing pills takes extra effort. - 0  6. Swallowing is painful. - 0  7. The pleasure of eating is affected by my swallowing. - 0  8. When I swallow food sticks in my throat. - 2  9. I cough when I eat. - 4  10. Swallowing is stressful. - 0    Diego ranked his swallowing function as a 8/40     Interpretation: Score of greater than 3 is indicative of a swallowing disorder (Lauren et al., 2008); higher scores correlate with increased penetration-aspiration  scale scores, and scores greater than 15 results in the patient being 2.2 times more likely to aspirate (Mirza et al., 2015)    References:   MAYELIN Aguilar., BERTO Parry., CHUCKY Emanuel., OBINNA Pittman., LULÚ Quiroz., OBINNA Leija, & CHARBEL Casas. (2008). Validity and reliability of the Eating Assessment Tool (EAT-10). Annals of Otology, Rhinology & Laryngology, 117(12), 919-924. [email or web address]  BERTO Blue., CESARIO Mercer., OBINNA Mcrae., Vignesh, M. A., & MAYELIN Aguilar. (2015). The ability of the 10-item eating assessment tool (EAT-10) to predict aspiration risk in persons with dysphagia. Annals of Otology, Rhinology & Laryngology, 124(5), 351-354. [email or web address]     Reflux Severity Index:  The Reflux Severity Index (RSI) was completed to assess the possible presence and/or severity of laryngopharyngeal reflux symptoms and any relationship between this condition and the patient's dysphagia. A score of greater than or equal to 15 may indicate laryngopharyngeal reflux. Score 0-5 (0=no problem, 1=very mild, 2=moderate or slight, 3=moderate, 4=severe, & 5=problem as bad as it can be)  1. Hoarseness or a problem with your voice. 0  2. Clearing your throat. 3  3. Excess throat mucous or post-nasal drip. 3  4. Difficulty swallowing food, liquids or pills. 3  5. Coughing after you ate or after lying down. 0  6. Breathing difficulties or choking episodes. 0  7. Troublesome or annoying cough. 3  8.  Sensations of something sticking in your throat. 3  9. Heartburn, chest pain, indigestion, or stomach acid coming up. 0  Patient completed with a result of  15/45    Recommend MBSS: No    Treatment   Treatment Time In: n/a  Treatment Time Out: n/a  Total Treatment Time: n/a  No treatment performed 2/2 time to administer evaluation    Education: Plan of Care, role of SLP in care, aspiration precautions , and course of medical disease affect on therapy diagnosis  were discussed with the patient. Additionally, SLP and patient/patient's wife discussed the pillars of aspiration related pneumonia. According to Dr. Neville Alcantar (2005), there are 3 pillars that contribute to aspiration related pneumonia. The Three Pillars of Aspiration Pneumonia is research showing that aspiration pneumonia only develops when three factors are present: aspiration, compromised immune system, poor oral hygiene. If all three factors are not present, risk of aspiration developing into pneumonia is very minimal, meaning that risk of dehydration and malnutrition is greater than pneumonia. Please see research articles below for reference or feel free to contact me regarding any questions or concerns. Dr. Kelly Lozano and colleagues (1998) also concluded the number one predictor of aspiration pneumonia is being dependent on others for provision of oral care. Therefore, oral care was discussed at length with the patient. Recommended: Aggressive oral care at least three times daily is strongly recommended to reduce bacteria on oropharyngeal surfaces which may increase the risk of nosocomial infections, including pneumonia. Advantages and disadvantages of thickened liquids discussed with the patient. Advantages discussed included prevention of aspiration, easier to control, and may reduce uncomfortable coughing while drinking. Disadvantages discussed included reduced quality of life due to inability to drink your favorite drinks without thickener  (Adam, Rhonda, Eb, Nathan, & Jorge 2002; Virgil, Ely, & Jesenia 2005), Dehydration, because people often dislike thickened liquids and drink less (Francis 2001), More throat residue, because the muscles of your throat need to squeeze harder to clear thickened liquids (Kalyan et al. 2008), and If aspirated, thickened liquids are more likely to cause pneumonia than thin liquids. This is especially a concern if you have a large amount of residue in your throat after swallowing (Nathan et al. 2008; Yassine et al. 2017).  Patient and family members expressed understanding of all discussed.     Home Program: TBD  Assessment   Diego is a 83 y.o. male referred to outpatient Speech Therapy with a medical diagnosis of Dysphagia, unspecified type and throat clearing. Results of this FEES revealted that the pt presents with moderate oropharyngeal dysphagia  as defined by the dysphagia outcome and severity scale (adapted for FEES by OLGA Alcantar,  Swallowing Services, [facility] from O'Jake et al 1999).    Oral phase findings Posterior containment Within normal limits    Mastication Within normal limits    Clearance Within normal limits   Pharyngeal phase findings Initiation of the swallow mildy delayed however this is a variation of normal given the patient's age    Base of tongue retraction Moderately  impaired    Epiglottic movement Partial Movement - inconsistent    Pharyngeal contraction Moderately to severely reduced pharyngeal wall contraction    Laryngeal vestibule closure Functional    PES opening Within normal limits    Other findings Multiple spontaneous swallows per bolus      Moderate oropharyngeal dysphagia, likely chronic related to previously documented oropharyngeal dysphagia in 2016. Swallow safety is preserved; however, swallow efficiency is impaired.. Patient appears to be at low risk for aspiration related pneumonia from a primary oropharyngeal dysphagia in consideration of three pillars  of aspiration pneumonia (Ortiz, 2005) including oral health status, overall health/immune status, and laryngeal vestibule closure/severity of dysphagia.  However, unable to assess risk related to aspiration pneumonia cause by the aspiration of gastric content. Patient at low risk for malnutrition/dehydration. Patient appears to be a good candidate for behavioral swallow rehabilitation.     Consistency Recommendations:  Thin liquids (IDDSI 0) and soft and bite sized consistencies (IDDSI 6).  Avoid mixed consistencies.     Functional Oral Intake Scale (FOIS)  The Functional Oral Intake Scale (FOIS) is an ordinal scale that is used to assess the current status and meaningful change in the oral intake. FOIS levels include:    TUBE DEPENDENT (levels 1-3) 1. No oral intake  2. Tube dependent with minimal/inconsistent oral intake  3. Tube supplements with consistent oral intake      TOTAL ORAL INTAKE (levels 4-7) 4. Total oral intake of a single consistency  5. Total oral intake of multiple consistencies requiring special preparation  6. Total oral intake with no special preparation, but must avoid specific foods or liquid items  7. Total oral intake with no restrictions     Patient is currently judged to be at FOIS level 6.      Demonstrates impairments including limitations as described in the problem list. Positive prognostic factors include patient motivation and family support. Negative prognostic factors include complex medical history.No barriers to therapy identified. Patient will benefit from skilled, outpatient dysphagia therapy.    Rehab Potential: fair to good  Pt's spiritual, cultural and educational needs considered and pt agreeable to plan of care and goals.    Short Term Goals (4 weeks):   Patient will participate in tongue base retraction exercises (I.e., hard effortful swallow) 75-95 times given minimum cues to improve tongue base retraction and overall swallow function.   Patient will participate in  chin tuck against resistance (CTAR) hold for 60 seconds for 3 sets given minimum cues to improve hyolaryngeal elevation / excursion and overall swallow function.  Patient will participate in chin tuck against resistance (CTAR) repetitions 30-45 times given minimum cues to improve hyolaryngeal elevation / excursion and overall swallow function.  Patient will recall and utilize aspiration precautions and safe swallow strategies independently     Long Term Goals (8 weeks):   1. He  will maintain adequate hydration/nutrition with optimum safety and efficiency of swallowing function on PO intake without overt signs and symptoms of aspiration for The International Dysphagia Diet Standardisation Initiative level 0 / The International Dysphagia Diet Standardisation Initiative level 7.       2. He  will utilize compensatory strategies with optimum safety and efficiency of swallowing function on PO intake without overt signs and symptoms of aspiration for The International Dysphagia Diet Standardisation Initiative level 0 / The International Dysphagia Diet Standardisation Initiative level 7.    Plan   Plan of Care Certification: 10/16/2023  to 12/11/23    Recommended Treatment Plan: Patient will participate in the [facility] neurological rehabilitation program for speech therapy 2 times per week to address his Swallow deficits, to educate patient and their family, and to participate in a home exercise program.     Other Recommendations:   - Aggressive oral care at least twice daily (morning and bedtime) is strongly recommended to reduce bacteria on oropharyngeal surfaces which may increase the risk of nosocomial infections, including pneumonia.   - Monitor for any signs/symptoms of aspiration (such as wet/gurgly voice that does not clear with coughing, inability to make any voice sounds, any persistent coughing with oral intake, otherwise unexplained fever, unexplained increased or new difficulty or discomfort breathing,  unexplained increase in sleepiness/lethargy/significant fatigue, unexplained increase or new onset confusion or change in cognitive functioning, or any other unexplained change in health or well-being that could be related to swallowing).  - Risk Management: use good oral hygiene , sit upright for all PO intake, increase physical mobility as tolerated, behavioral reflux precautions, feed only when wake and alert, alternate bites and sips, small bites and sips, multiple swallows per bolus, allow extra time for meals, remain upright for at least 2 hours following any PO intake, and encourage volitional dry swallows and coughs throughout meals  -Specialist Referrals: n/a  -Ancillary Tests: n/a.  -Follow-up exam: Follow up instrumental assessment of the swallow should be completed at the recommendation of the treating clinician.    Therapist's Name:   BRAEDEN Lowery L-SLP, CCC-SLP  Speech Language Pathologist   Date: 10/16/2023

## 2023-10-16 NOTE — TELEPHONE ENCOUNTER
No care due was identified.  [facility] Embedded Care Due Messages. Reference number: [id number].   10/16/2023 3:22:27 PM CDT

## 2023-10-20 ENCOUNTER — OFFICE VISIT (OUTPATIENT)
Dept: OTOLARYNGOLOGY | Facility: CLINIC | Age: 83
End: 2023-10-20
Payer: MEDICARE

## 2023-10-20 VITALS
WEIGHT: 159.75 LBS | BODY MASS INDEX: 22.92 KG/M2 | DIASTOLIC BLOOD PRESSURE: 85 MMHG | SYSTOLIC BLOOD PRESSURE: 125 MMHG | HEART RATE: 64 BPM

## 2023-10-20 DIAGNOSIS — H61.23 BILATERAL IMPACTED CERUMEN: Primary | ICD-10-CM

## 2023-10-20 DIAGNOSIS — H90.3 SENSORINEURAL HEARING LOSS (SNHL) OF BOTH EARS: ICD-10-CM

## 2023-10-20 DIAGNOSIS — Z97.4 WEARS HEARING AID IN BOTH EARS: ICD-10-CM

## 2023-10-20 PROCEDURE — 1101F PR PT FALLS ASSESS DOC 0-1 FALLS W/OUT INJ PAST YR: ICD-10-PCS | Mod: CPTII,S$GLB,, | Performed by: NURSE PRACTITIONER

## 2023-10-20 PROCEDURE — 1101F PT FALLS ASSESS-DOCD LE1/YR: CPT | Mod: CPTII,S$GLB,, | Performed by: NURSE PRACTITIONER

## 2023-10-20 PROCEDURE — 3288F PR FALLS RISK ASSESSMENT DOCUMENTED: ICD-10-PCS | Mod: CPTII,S$GLB,, | Performed by: NURSE PRACTITIONER

## 2023-10-20 PROCEDURE — 1159F MED LIST DOCD IN RCRD: CPT | Mod: CPTII,S$GLB,, | Performed by: NURSE PRACTITIONER

## 2023-10-20 PROCEDURE — 3079F DIAST BP 80-89 MM HG: CPT | Mod: CPTII,S$GLB,, | Performed by: NURSE PRACTITIONER

## 2023-10-20 PROCEDURE — 1157F PR ADVANCE CARE PLAN OR EQUIV PRESENT IN MEDICAL RECORD: ICD-10-PCS | Mod: CPTII,S$GLB,, | Performed by: NURSE PRACTITIONER

## 2023-10-20 PROCEDURE — 1157F ADVNC CARE PLAN IN RCRD: CPT | Mod: CPTII,S$GLB,, | Performed by: NURSE PRACTITIONER

## 2023-10-20 PROCEDURE — 1159F PR MEDICATION LIST DOCUMENTED IN MEDICAL RECORD: ICD-10-PCS | Mod: CPTII,S$GLB,, | Performed by: NURSE PRACTITIONER

## 2023-10-20 PROCEDURE — 1126F PR PAIN SEVERITY QUANTIFIED, NO PAIN PRESENT: ICD-10-PCS | Mod: CPTII,S$GLB,, | Performed by: NURSE PRACTITIONER

## 2023-10-20 PROCEDURE — 99213 PR OFFICE/OUTPT VISIT, EST, LEVL III, 20-29 MIN: ICD-10-PCS | Mod: 25,S$GLB,, | Performed by: NURSE PRACTITIONER

## 2023-10-20 PROCEDURE — 99999 PR PBB SHADOW E&M-EST. PATIENT-LVL III: ICD-10-PCS | Mod: PBBFAC,,, | Performed by: NURSE PRACTITIONER

## 2023-10-20 PROCEDURE — 1160F PR REVIEW ALL MEDS BY PRESCRIBER/CLIN PHARMACIST DOCUMENTED: ICD-10-PCS | Mod: CPTII,S$GLB,, | Performed by: NURSE PRACTITIONER

## 2023-10-20 PROCEDURE — 1126F AMNT PAIN NOTED NONE PRSNT: CPT | Mod: CPTII,S$GLB,, | Performed by: NURSE PRACTITIONER

## 2023-10-20 PROCEDURE — 1160F RVW MEDS BY RX/DR IN RCRD: CPT | Mod: CPTII,S$GLB,, | Performed by: NURSE PRACTITIONER

## 2023-10-20 PROCEDURE — 99999 PR PBB SHADOW E&M-EST. PATIENT-LVL III: CPT | Mod: PBBFAC,,, | Performed by: NURSE PRACTITIONER

## 2023-10-20 PROCEDURE — 3074F SYST BP LT 130 MM HG: CPT | Mod: CPTII,S$GLB,, | Performed by: NURSE PRACTITIONER

## 2023-10-20 PROCEDURE — 3079F PR MOST RECENT DIASTOLIC BLOOD PRESSURE 80-89 MM HG: ICD-10-PCS | Mod: CPTII,S$GLB,, | Performed by: NURSE PRACTITIONER

## 2023-10-20 PROCEDURE — 69210 REMOVE IMPACTED EAR WAX UNI: CPT | Mod: S$GLB,,, | Performed by: NURSE PRACTITIONER

## 2023-10-20 PROCEDURE — 99213 OFFICE O/P EST LOW 20 MIN: CPT | Mod: 25,S$GLB,, | Performed by: NURSE PRACTITIONER

## 2023-10-20 PROCEDURE — 3288F FALL RISK ASSESSMENT DOCD: CPT | Mod: CPTII,S$GLB,, | Performed by: NURSE PRACTITIONER

## 2023-10-20 PROCEDURE — 3074F PR MOST RECENT SYSTOLIC BLOOD PRESSURE < 130 MM HG: ICD-10-PCS | Mod: CPTII,S$GLB,, | Performed by: NURSE PRACTITIONER

## 2023-10-20 PROCEDURE — 69210 EAR CERUMEN REMOVAL: ICD-10-PCS | Mod: S$GLB,,, | Performed by: NURSE PRACTITIONER

## 2023-10-20 NOTE — PROCEDURES
Ear Cerumen Removal    Date/Time: 10/20/2023 1:40 PM    Performed by: Diane Dalton NP  Authorized by: Diane Dalton NP    Consent Done?:  Yes (Verbal)  Location details:  Both ears  Procedure type: curette    Cerumen  Removal Results:  Cerumen completely removed  Patient tolerance:  Patient tolerated the procedure well with no immediate complications

## 2023-10-20 NOTE — PROGRESS NOTES
Patient ID: Diego Gunter is a 83 y.o. y.o. male    Chief Complaint:   Chief Complaint   Patient presents with    Cerumen Impaction     Wax removal        Patient is self-referred for evaluation of a possible wax impaction in bilateral ears.  he has complaints of hearing loss in the affected ears, but denies pain or drainage.  This has been an issue in the past.  The patient has not been using any sort of ear drop to soften the wax.  He wears bilateral hearing aids.       Review of Systems   Constitutional: Negative for fever, chills, fatigue and unexpected weight change.   HENT: Positive for ear blockage. Negative for hearing loss, nosebleeds, congestion, sore throat, facial swelling, rhinorrhea, sneezing, trouble swallowing, dental problem, voice change, postnasal drip, sinus pressure, tinnitus and ear discharge.    Eyes: Negative for redness, itching and visual disturbance.   Respiratory: Negative for cough, choking and wheezing.    Cardiovascular: Negative for chest pain and palpitations.   Gastrointestinal: Negative for abdominal pain.        No reflux.   Musculoskeletal: Negative for gait problem.   Skin: Negative for rash.   Neurological: Negative for dizziness, light-headedness and headaches.     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 Dr. Pham    Hx of colonic polyps     followed by GI. Dr. Pham    Hyperlipidemia associated with type 2 diabetes mellitus     Hyperlipidemia LDL goal <100     Hypernatremia 05/05/2022    Hypertension associated with type 2 diabetes  mellitus     Hypertension associated with type 2 diabetes mellitus     Hypotension     Intermittent confusion 04/29/2022    Overweight(278.02)     Prostate cancer 09/2011    followed by urology, Dr. Rogers    Pulmonary embolism     Reducible right inguinal hernia 10/10/2022    Right hemiparesis 07/22/2022    Secondary to cervical hematoma    Type II or unspecified type diabetes mellitus without mention of complication, not stated as uncontrolled     diet controlled    Urinary retention 04/29/2022     Past Surgical History:   Procedure Laterality Date    BRONCHOSCOPY N/A 10/15/2018    Procedure: BRONCHOSCOPY;  Surgeon: [facility];  Location: [facility] OR 2ND FLR;  Service: Anesthesiology;  Laterality: N/A;    CARPAL TUNNEL RELEASE Right 1/11/2023    Procedure: RELEASE, CARPAL TUNNEL;  Surgeon: Romero Lazo MD;  Location: [facility] OR;  Service: Orthopedics;  Laterality: Right;    CATARACT EXTRACTION, BILATERAL  2014    COLONOSCOPY N/A 5/16/2023    Procedure: COLONOSCOPY;  Surgeon: Buster Sequeira MD;  Location: [facility] (4TH FLR);  Service: Endoscopy;  Laterality: N/A;    DECOMPRESSION, NERVE, ULNAR Right 1/11/2023    Procedure: DECOMPRESSION, NERVE, ULNAR - right cub jolie and guyon's decompression as well as open right carpal tunnel release;  Surgeon: Romero Lazo MD;  Location: [facility] OR;  Service: Orthopedics;  Laterality: Right;    ESOPHAGOGASTRODUODENOSCOPY N/A 5/16/2023    Procedure: EGD (ESOPHAGOGASTRODUODENOSCOPY);  Surgeon: Buster Sequeira MD;  Location: [facility] (4TH FLR);  Service: Endoscopy;  Laterality: N/A;  inst mailed-RB  5/10/23 no answer for precall/mleone lpn    POSTERIOR CERVICAL LAMINECTOMY Bilateral 4/28/2022    Procedure: LAMINECTOMY, SPINE, CERVICAL, POSTERIOR APPROACH C3-6;  Surgeon: Carlos Miller MD;  Location: [facility] OR 2ND FLR;  Service: Neurosurgery;  Laterality: Bilateral;    PROSTATECTOMY      REPAIR, HERNIA, INGUINAL, WITHOUT HISTORY OF PRIOR REPAIR, AGE 5 YEARS OR OLDER Right  10/10/2022    Procedure: REPAIR, HERNIA, INGUINAL, WITHOUT HISTORY OF PRIOR REPAIR, AGE 5 YEARS OR OLDER;  Surgeon: Dario Esquivel MD;  Location: [facility] OR [address] [place] [address];  Service: General;  Laterality: Right;  open right inguinal hernia repair with mesh     Social History     Socioeconomic History    Marital status:      Spouse name: Addis    Number of children: 2   Occupational History    Occupation: tool    Tobacco Use    Smoking status: Former     Current packs/day: 0.00     Average packs/day: 0.3 packs/day for 5.0 years (1.3 ttl pk-yrs)     Types: Cigarettes     Start date: 10/2/1957     Quit date: 10/2/1962     Years since quittin.0    Smokeless tobacco: Former    Tobacco comments:     quit age 23   Substance and Sexual Activity    Alcohol use: Not Currently    Drug use: No    Sexual activity: Yes     Partners: Female     Social Determinants of Health     Financial Resource Strain: Low Risk  (3/30/2023)    Overall Financial Resource Strain (CARDIA)     Difficulty of Paying Living Expenses: Not hard at all   Food Insecurity: No Food Insecurity (3/30/2023)    Hunger Vital Sign     Worried About Running Out of Food in the Last Year: Never true     Ran Out of Food in the Last Year: Never true   Transportation Needs: No Transportation Needs (3/30/2023)    PRAPARE - Transportation     Lack of Transportation (Medical): No     Lack of Transportation (Non-Medical): No   Physical Activity: Inactive (3/30/2023)    Exercise Vital Sign     Days of Exercise per Week: 0 days     Minutes of Exercise per Session: 10 min   Stress: No Stress Concern Present (3/30/2023)    [employer] - Occupational Stress Questionnaire     Feeling of Stress : Only a little   Social Connections: Moderately Integrated (3/30/2023)    Social Connection and Isolation Panel [NHANES]     Frequency of Communication with Friends and Family: Twice a week     Frequency of Social Gatherings with Friends and  Family: Once a week     Attends Islam Services: More than 4 times per year     Active Member of Clubs or Organizations: No     Attends Club or Organization Meetings: Never     Marital Status:    Housing Stability: Low Risk  (3/30/2023)    Housing Stability Vital Sign     Unable to Pay for Housing in the Last Year: No     Number of Places Lived in the Last Year: 1     Unstable Housing in the Last Year: No     Family History   Problem Relation Age of Onset    Colon cancer Mother     Diabetes Mother     Heart disease Father     Mental illness Sister     Diabetes Sister     Other Brother         polio as a child    Pulmonary fibrosis Brother     Diabetes Brother     Myasthenia gravis Brother     Parkinsonism Brother     Diabetes Brother     Dementia Brother     Lung cancer Brother         smoker    Diabetes Maternal Aunt     Diabetes Other     Esophageal cancer Neg Hx        Objective:      Vitals:    10/20/23 1320   BP: 125/85   Pulse: 64       Physical Exam   Constitutional: Well appearing / communicating without difficutly.  NAD.  Eyes: EOM I Bilaterally  Head/Face: Normocephalic. Negative paranasal sinus pressure/tenderness.  Salivary glands WNL.  House Brackmann I Bilaterally.     Right Ear: Auricle normal appearance. External Auditory Canal with cerumen impaction. EAC with no lesions or masses,TM w/o masses/lesions/perforations. TM mobility noted.   Left Ear: Auricle normal appearance. External Auditory Canal with cerumen impaction. EAC with no lesions or masses,TM w/o masses/lesions/perforations. TM mobility noted.  Nose: No gross nasal septal deviation. Inferior Turbinates 3+ bilaterally. No septal perforation. No masses/lesions. External nasal skin appears normal without masses/lesions.   Oral Cavity: Gingiva/lips within normal limits.  Dentition/gingiva healthy appearing. Mucus membranes moist. Floor of mouth soft, no masses palpated. Oral Tongue mobile. Hard Palate appears normal.    Oropharynx:  Base of tongue appears normal. No masses/lesions noted. Tonsillar fossa/pharyngeal wall without lesions. Posterior oropharynx WNL.  Soft palate without masses. Midline uvula.   Neck/Lymphatic: No LAD I-VI bilaterally.  No thyromegaly.  No masses noted on exam.     Mirror laryngoscopy/nasopharyngoscopy: Active gag reflex.  Unable to perform.     Neuro/Psychiatric: AOx3.  Normal mood and affect.   Cardiovascular: Normal carotid pulses bilaterally, no increasing jugular venous distention noted at cervical region bilaterally.    Respiratory: Normal respiratory effort, no stridor, no retractions noted.      Ear Cerumen Removal    Date/Time: 10/20/2023 1:40 PM    Performed by: Diane Dalton NP  Authorized by: Diane Dalton, NP    Consent Done?:  Yes (Verbal)  Location details:  Both ears  Procedure type: curette    Cerumen  Removal Results:  Cerumen completely removed  Patient tolerance:  Patient tolerated the procedure well with no immediate complications      Assessment:         ICD-10-CM ICD-9-CM    1. Bilateral impacted cerumen  H61.23 380.4 Ear Cerumen Removal      2. Sensorineural hearing loss (SNHL) of both ears  H90.3 389.18       3. Wears hearing aid in both ears  Z97.4 [id number]            Plan:       1.   Cerumen impaction:  Removed today without difficulty.  I would recommend the use of a wax softening drop, either over the counter Debrox or mineral oil, on a weekly basis.  I also instructed the patient to avoid Qtips.  2. F/u 6 months for ear cleaning and audiogram          Diane Dalton, JOSE    Answers submitted by the patient for this visit:  Review of Symptoms Questionnaire  (Submitted on 10/14/2023)  None of these: Yes  trouble swallowing: Yes  None of these : Yes  None of these: Yes  None of these : Yes  None of these: Yes  None of these: Yes  None of these: Yes  None of these : Yes  None of these: Yes  Cold all of the time? : Yes  None of these: Yes  None of these: Yes  None of these: Yes

## 2023-10-23 PROBLEM — R13.10 DYSPHAGIA: Status: ACTIVE | Noted: 2023-10-23

## 2023-10-23 NOTE — PROGRESS NOTES
INFECT[facility]OUS DISEASE CLINIC  9/21/23  11:00AM    Subjective:      Chief Complaint:   Pulmonary MAC follow up     History of Present Illness:    Patient Diego Gunter is a 83 y.o. male  with h/o emphysema and cavitary MAC, recent epidural hematoma, seen in clinic for MAC f/u. Antibiotics on hold 2/2 development of resistance and has been unable to obtain alternative antibiotics 2/2 cost (tedezolid, omadacycline). Compliant with aerobika and inhaled hypertonic saline. Dropping off sputum cultures monthly.         Organism     MYCOBACTERIUM AVIUM COMPLEX   Antibiotic                   TED (mcg/mL)  Interpretation   ----------------------------------------------------------   Clofazimine                          0.12   Moxifloxacin                            2       I   Clarithromycin                        >64       R   Amikacin (IV)                          64       R   Amikacin (liposomal, inhaled)          64       S   Linezolid                              32       R       4/17/23  Organism     MYCOBACTERIUM AVIUM COMPLEX   Antibiotic                   TED (mcg/mL)  Interpretation   ----------------------------------------------------------   Clofazimine                          0.12   Moxifloxacin                            2       I   Clarithromycin                        >64       R   Amikacin (IV)                          32       I   Amikacin (liposomal, inhaled)          32       S   Linezolid                              32       R       Last CT  9/5/23  Changes of chronic HILLARY infection.      Organism     MYCOBACTERIUM AVIUM   Antibiotic                   TED (mcg/mL)  Interpretation   ----------------------------------------------------------   Clofazimine                          0.12   Moxifloxacin                            4       R   Clarithromycin                          2       S   Amikacin (IV)                           8       S   Amikacin (liposomal, inhaled)           8       S   Linezolid      Patient called back, appt made and instructed her that she would need to have a lab drawn prior to appt.   She verbalized understanding after detailed instructions.                             32       R        Takes prilosec for reflux.     Quit smoking 57 y ago.     Had bronch 10/2018 for work up of lung mass     BRONCHIAL WASH CYTOLOGY:  NEGATIVE EXAM-NO NEOPLASIA IDENTIFIED  NO ORGANISMS IDENTIFIED WITH A GMS STAIN  THE CONTROL STAINED APPROPRIATEL          AFB cultures 10/7/21, 10/28/21, 11/18/21, 12/9/21 all positive MAC     CT 11/2021  1. Continued evolution of previously described right upper lobe cavitary lesion measuring approximately 3.5 x 4.3 x 5.7-cm (previously 3.3 x 4.4 x 5.1-cm). Similar to minimal interval decrease in the amount of surrounding wall thickness, consolidation and surrounding scattered small consolidative multifocal airspace opacities.  Mediastinal lymphadenopathy, not significantly changed.  2. Symmetric moderate-severe centrilobular emphysematous changes with basoapical gradient, not significantly changed.      Review of Symptoms:  Constitutional: Denies fevers, chills, or weakness.  ENT: Denies dysphagia, nasal discharge, ear pain or discharge.  Cardiovascular: Denies chest pain, palpitations, orthopnea, or claudication.  Respiratory: Denies shortness of breath, cough, hemoptysis, or wheezing.  GI: Denies nausea/vomitting, hematochezia, melena, abd pain, or changes in appetite.  : Denies dysuria, incontinence, or hematuria.  Musculoskeletal: Denies joint pain or myalgias.  Skin/breast: Denies rashes, lumps, lesions, or discharge.  Neurologic: Denies headache, dizziness, vertigo, or paresthesias.    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 Dr. Pham    Hx of colonic polyps     followed by GI. Dr. Pham    Hyperlipidemia associated with type 2 diabetes mellitus     Hyperlipidemia LDL goal <100     Hypernatremia 05/05/2022    Hypertension associated with type 2 diabetes mellitus     Hypertension associated with type 2 diabetes mellitus     Hypotension     Intermittent confusion 04/29/2022    Overweight(278.02)     Prostate cancer 09/2011    followed by urology, Dr. Rogers    Pulmonary embolism     Reducible right inguinal hernia 10/10/2022    Right hemiparesis 07/22/2022    Secondary to cervical hematoma    Type II or unspecified type diabetes mellitus without mention of complication, not stated as uncontrolled     diet controlled    Urinary retention 04/29/2022       Past Surgical History:   Procedure Laterality Date    BRONCHOSCOPY N/A 10/15/2018    Procedure: BRONCHOSCOPY;  [facility];  Location: [facility] OR 2ND FLR;  Service: Anesthesiology;  Laterality: N/A;    CARPAL TUNNEL RELEASE Right 1/11/2023    Procedure: RELEASE, CARPAL TUNNEL;  Surgeon: Romero Lazo MD;  Location: [facility] OR;  Service: Orthopedics;  Laterality: Right;    CATARACT EXTRACTION, BILATERAL  2014    COLONOSCOPY N/A 5/16/2023    Procedure: COLONOSCOPY;  Surgeon: Buster Sequeira MD;  Location: [facility] (4TH FLR);  Service: Endoscopy;  Laterality: N/A;    DECOMPRESSION, NERVE, ULNAR Right 1/11/2023    Procedure: DECOMPRESSION, NERVE, ULNAR - right cub jolie and guyon's decompression as well as open right carpal tunnel release;  Surgeon: Romero Lazo MD;  Location: [facility] OR;  Service: Orthopedics;  Laterality: Right;    ESOPHAGOGASTRODUODENOSCOPY N/A 5/16/2023    Procedure: EGD (ESOPHAGOGASTRODUODENOSCOPY);  Surgeon: Buster Sequeira MD;  Location: [facility] ENDO (4TH FLR);  Service: Endoscopy;  Laterality: N/A;  inst mailed-RB  5/10/23 no answer for precall/mleone lpn    POSTERIOR CERVICAL LAMINECTOMY Bilateral 4/28/2022     Procedure: LAMINECTOMY, SPINE, CERVICAL, POSTERIOR APPROACH C3-6;  Surgeon: Carlos Miller MD;  Location: [facility] OR [address] [place] [address];  Service: Neurosurgery;  Laterality: Bilateral;    PROSTATECTOMY      REPAIR, HERNIA, INGUINAL, WITHOUT HISTORY OF PRIOR REPAIR, AGE 5 YEARS OR OLDER Right 10/10/2022    Procedure: REPAIR, HERNIA, INGUINAL, WITHOUT HISTORY OF PRIOR REPAIR, AGE 5 YEARS OR OLDER;  Surgeon: Dario Esquivel MD;  Location: [facility] OR [address] [place] [address];  Service: General;  Laterality: Right;  open right inguinal hernia repair with mesh       Family History   Problem Relation Age of Onset    Colon cancer Mother     Diabetes Mother     Heart disease Father     Mental illness Sister     Diabetes Sister     Other Brother         polio as a child    Pulmonary fibrosis Brother     Diabetes Brother     Myasthenia gravis Brother     Parkinsonism Brother     Diabetes Brother     Dementia Brother     Lung cancer Brother         smoker    Diabetes Maternal Aunt     Diabetes Other     Esophageal cancer Neg Hx        Social History     Socioeconomic History    Marital status:      Spouse name: Addis    Number of children: 2   Occupational History    Occupation: tool    Tobacco Use    Smoking status: Former     Current packs/day: 0.00     Average packs/day: 0.3 packs/day for 5.0 years (1.3 ttl pk-yrs)     Types: Cigarettes     Start date: 10/2/1957     Quit date: 10/2/1962     Years since quittin.0    Smokeless tobacco: Former    Tobacco comments:     quit age 23   Substance and Sexual Activity    Alcohol use: Not Currently    Drug use: No    Sexual activity: Yes     Partners: Female     Social Determinants of Health     Financial Resource Strain: Low Risk  (3/30/2023)    Overall Financial Resource Strain (CARDIA)     Difficulty of Paying Living Expenses: Not hard at all   Food Insecurity: No Food Insecurity (3/30/2023)    Hunger Vital Sign     Worried About Running Out of Food in the Last Year: Never true     Ran  Out of Food in the Last Year: Never true   Transportation Needs: No Transportation Needs (3/30/2023)    PRAPARE - Transportation     Lack of Transportation (Medical): No     Lack of Transportation (Non-Medical): No   Physical Activity: Inactive (3/30/2023)    Exercise Vital Sign     Days of Exercise per Week: 0 days     Minutes of Exercise per Session: 10 min   Stress: No Stress Concern Present (3/30/2023)    [place] [employer] - Occupational Stress Questionnaire     Feeling of Stress : Only a little   Social Connections: Moderately Integrated (3/30/2023)    Social Connection and Isolation Panel [NHANES]     Frequency of Communication with Friends and Family: Twice a week     Frequency of Social Gatherings with Friends and Family: Once a week     Attends [employer] Services: More than 4 times per year     Active Member of Clubs or Organizations: No     Attends Club or Organization Meetings: Never     Marital Status:    Housing Stability: Low Risk  (3/30/2023)    Housing Stability Vital Sign     Unable to Pay for Housing in the Last Year: No     Number of Places Lived in the Last Year: 1     Unstable Housing in the Last Year: No       Review of patient's allergies indicates:  No Known Allergies      Objective:   VS (24h):   Vitals:    09/21/23 1053   BP: 117/68     [unfilled]  General: Afebrile, alert, comfortable, no acute distress.   HEENT: TERELL. EOMI, no scleral icterus.   Pulmonary: Non labored,clear to auscultation A/P/L. No wheezing, crackles, or rhonchi.  Cardiac: normal S1 & S2 w/o rubs/murmurs/gallops.   Abdominal: Non-tender, non-distended.  Extremities: Moves all extremities x 4. No peripheral edema.  Skin: No jaundice, rashes, or visible lesions.   Neurological:  Alert and oriented x 4.     Labs:    Glucose   Date Value Ref Range Status   07/24/2023 84 70 - 110 mg/dL Final   07/07/2023 100 70 - 110 mg/dL Final   03/08/2023 85 70 - 110 mg/dL Final       Calcium   Date Value  Ref Range Status   07/24/2023 9.2 8.7 - 10.5 mg/dL Final   07/07/2023 9.6 8.7 - 10.5 mg/dL Final   03/08/2023 9.9 8.7 - 10.5 mg/dL Final       Albumin   Date Value Ref Range Status   07/24/2023 4.1 3.5 - 5.2 g/dL Final   07/07/2023 4.5 3.5 - 5.2 g/dL Final   03/08/2023 4.1 3.5 - 5.2 g/dL Final       Total Protein   Date Value Ref Range Status   07/24/2023 6.8 6.0 - 8.4 g/dL Final   07/07/2023 7.3 6.0 - 8.4 g/dL Final   03/08/2023 7.4 6.0 - 8.4 g/dL Final       Sodium   Date Value Ref Range Status   07/24/2023 140 136 - 145 mmol/L Final   07/07/2023 140 136 - 145 mmol/L Final   03/08/2023 140 136 - 145 mmol/L Final       Potassium   Date Value Ref Range Status   07/24/2023 4.1 3.5 - 5.1 mmol/L Final   07/07/2023 4.5 3.5 - 5.1 mmol/L Final   03/08/2023 4.4 3.5 - 5.1 mmol/L Final       CO2   Date Value Ref Range Status   07/24/2023 28 23 - 29 mmol/L Final   07/07/2023 30 (H) 23 - 29 mmol/L Final   03/08/2023 30 (H) 23 - 29 mmol/L Final       Chloride   Date Value Ref Range Status   07/24/2023 106 95 - 110 mmol/L Final   07/07/2023 105 95 - 110 mmol/L Final   03/08/2023 104 95 - 110 mmol/L Final       BUN   Date Value Ref Range Status   07/24/2023 14 8 - 23 mg/dL Final   07/07/2023 13 8 - 23 mg/dL Final   03/08/2023 18 8 - 23 mg/dL Final       Creatinine   Date Value Ref Range Status   07/24/2023 0.9 0.5 - 1.4 mg/dL Final   07/07/2023 1.0 0.5 - 1.4 mg/dL Final   03/08/2023 1.0 0.5 - 1.4 mg/dL Final       Alkaline Phosphatase   Date Value Ref Range Status   07/24/2023 62 55 - 135 U/L Final   07/07/2023 62 55 - 135 U/L Final   03/08/2023 68 55 - 135 U/L Final       ALT   Date Value Ref Range Status   07/24/2023 23 10 - 44 U/L Final   07/07/2023 26 10 - 44 U/L Final   03/08/2023 18 10 - 44 U/L Final       AST   Date Value Ref Range Status   07/24/2023 21 10 - 40 U/L Final   07/07/2023 23 10 - 40 U/L Final   03/08/2023 18 10 - 40 U/L Final       Total Bilirubin   Date Value Ref Range Status   07/24/2023 0.4 0.1 - 1.0 mg/dL  Final     Comment:     For infants and newborns, interpretation of results should be based  on gestational age, weight and in agreement with clinical  observations.    Premature Infant recommended reference ranges:  Up to 24 hours.............<8.0 mg/dL  Up to 48 hours............<12.0 mg/dL  3-5 days..................<15.0 mg/dL  6-29 days.................<15.0 mg/dL     07/07/2023 0.6 0.1 - 1.0 mg/dL Final     Comment:     For infants and newborns, interpretation of results should be based  on gestational age, weight and in agreement with clinical  observations.    Premature Infant recommended reference ranges:  Up to 24 hours.............<8.0 mg/dL  Up to 48 hours............<12.0 mg/dL  3-5 days..................<15.0 mg/dL  6-29 days.................<15.0 mg/dL     03/08/2023 0.5 0.1 - 1.0 mg/dL Final     Comment:     For infants and newborns, interpretation of results should be based  on gestational age, weight and in agreement with clinical  observations.    Premature Infant recommended reference ranges:  Up to 24 hours.............<8.0 mg/dL  Up to 48 hours............<12.0 mg/dL  3-5 days..................<15.0 mg/dL  6-29 days.................<15.0 mg/dL         WBC   Date Value Ref Range Status   11/04/2022 6.00 3.90 - 12.70 K/uL Final   10/21/2022 6.65 3.90 - 12.70 K/uL Final   09/19/2022 10.41 3.90 - 12.70 K/uL Final       Hemoglobin   Date Value Ref Range Status   11/04/2022 10.1 (L) 14.0 - 18.0 g/dL Final   10/21/2022 9.7 (L) 14.0 - 18.0 g/dL Final   09/19/2022 13.3 (L) 14.0 - 18.0 g/dL Final       POC Hematocrit   Date Value Ref Range Status   04/28/2022 34 (L) 36 - 54 %PCV Final   04/28/2022 42 36 - 54 %PCV Final     Hematocrit   Date Value Ref Range Status   11/04/2022 31.2 (L) 40.0 - 54.0 % Final   10/21/2022 30.0 (L) 40.0 - 54.0 % Final   09/19/2022 40.3 40.0 - 54.0 % Final       MCV   Date Value Ref Range Status   11/04/2022 86 82 - 98 fL Final   10/21/2022 84 82 - 98 fL Final   09/19/2022 81  "(L) 82 - 98 fL Final       Platelets   Date Value Ref Range Status   11/04/2022 340 150 - 450 K/uL Final   10/21/2022 396 150 - 450 K/uL Final   09/19/2022 275 150 - 450 K/uL Final       Lab Results   Component Value Date    CHOL 135 03/08/2023    CHOL 116 (L) 04/28/2022    CHOL 116 (L) 03/07/2022       Lab Results   Component Value Date    HDL 40 03/08/2023    HDL 36 (L) 04/28/2022    HDL 43 03/07/2022       Lab Results   Component Value Date    LDLCALC 70.8 03/08/2023    LDLCALC 62.4 (L) 04/28/2022    LDLCALC 54.4 (L) 03/07/2022       Lab Results   Component Value Date    TRIG 121 03/08/2023    TRIG 88 04/28/2022    TRIG 93 03/07/2022       Lab Results   Component Value Date    CHOLHDL 29.6 03/08/2023    CHOLHDL 31.0 04/28/2022    CHOLHDL 37.1 03/07/2022       RPR   Date Value Ref Range Status   05/08/2022 Non-reactive Non-reactive Final   10/21/2019 Non-reactive Non-reactive Final     No results found for: "QUANTIFERON"    Medications:  Current Outpatient Medications on File Prior to Visit   Medication Sig Dispense Refill    albuterol (VENTOLIN HFA) 90 mcg/actuation inhaler Inhale 2 puffs into the lungs daily as needed (30 min before arikayce). Rescue (Patient not taking: Reported on 9/18/2023) 18 g 11    ARIKAYCE 590 mg/8.4 mL NbSp       aspirin (ECOTRIN) 81 MG EC tablet Take 81 mg by mouth once daily.      azelastine (ASTELIN) 137 mcg (0.1 %) nasal spray 2 sprays (274 mcg total) by Nasal route 2 (two) times daily. (Patient not taking: Reported on 9/7/2023) 30 mL 0    b complex vitamins capsule Take 1 capsule by mouth once daily.      nebulizer and compressor ([employer] COMPRESSOR SYSTEM) Marcy use to administer nebulized medication as directed 1 each 0    pravastatin (PRAVACHOL) 10 MG tablet Take 1 tablet (10 mg total) by mouth every evening. 90 tablet 1    sodium chloride 3% 3 % nebulizer solution USE 4 ML VIAL IN NEBULIZER  TWICE DAILY 480 mL 11    [DISCONTINUED] omega-3 fatty acids 1,000 mg Cap Take 2 g by mouth. " "      No current facility-administered medications on file prior to visit.       Antibiotics:   Antibiotics (From admission, onward)      None            HIV: No components found for: "HIV 1/2 AG/AB"  Hepatitis C IgG: No components found for: "HEPATITIS C"  Syphilis:   RPR   Date Value Ref Range Status   05/08/2022 Non-reactive Non-reactive Final   10/21/2019 Non-reactive Non-reactive Final       Hepatitis A IgG: No components found for: "HEPATITIS A IGG"  Hepatitis Bc IgG: No components found for: "HEPATITIS B CORE IGG"  Hepatitis Bs IgG:  Quantiferon: No results found for: "QUANTIFERON"  VZV IgG: No components found for: "VARICELLA IGG"    No components found for: "SEDIMENTATION RATE"  No components found for: "C-REACTIVE PROTEIN"      Microbiology x 7d:   Microbiology Results (last 7 days)       ** No results found for the last 168 hours. **            Immunization History   Administered Date(s) Administered    COVID-19, MRNA, LN-S, PF (Pfizer) (Purple Cap) 01/13/2021, 04/28/2021, 11/05/2021    COVID-19, mRNA, LNP-S, PF, douglas-sucrose, 30 mcg/0.3 mL (Pfizer 2023 Ages 12+) 10/03/2023    Influenza 10/19/2022    Influenza (FLUAD) - Trivalent - Adjuvanted - PF (65+) 09/25/2019    Influenza - High Dose - PF (65 years and older) 09/18/2015, 10/04/2016, 10/23/2017, 09/21/2018    Influenza - Quadrivalent - High Dose - PF (65 years and older) 11/05/2021, 10/18/2022    Influenza - Quadrivalent - PF *Preferred* (6 months and older) 10/06/2014    Influenza A (H1N1) 2009 Monovalent - IM 01/22/2010    Influenza Split 10/10/2011, 10/06/2014    Pneumococcal Conjugate - 13 Valent 10/28/2015    Pneumococcal Polysaccharide - 23 Valent 07/12/2013    Tdap 03/08/2015    Zoster Recombinant 09/25/2019, 12/30/2019         Reviewed records today as well as relevant labs, cultures, and imaging    Assessment:     Pulmonary MAC, macrolide resistant   Cavitary lung lesion  Nodular liver- noted on CT; US abdomen 10/2021 No compelling " "sonographic evidence of cirrhosis.  empysema  Antiphospholipid antibody syndrome; h/o coumadin, paused after hematoma       83M with emphysema, epidural hematoma and cavitary pulm MAC that recently developed macrolide resistance on therapy. symptoms stable (minimal cough, weight stable). CT chest with stable cavitary lung lesion. sputum cultures persistently positive for MAC. Patient not interested in surgical resection.    Unfortunately he is resistant to azithromycin and iv amikacin. He has clofazamine and Arikyace waiting for him, but need another agent. I can not get omadacycline or tedezolid or bedqaqueline because insurance wont' cover ("not FDA approved for MAC")       Risk factor-   structural: emphysema, bronchiectasis  functional: reflux        Qtc 407 on 3/8/23    The patient has numerous risk factors for disease progression including male gender, older age,  fibrocavitary disease, macrolide resistance, bronchiectasis. pulm disease from macrolide resistent mac has low cure rate independnetly of these other factors.    Goal of treatment is to prevent progression and not cure; not clear that pt is interested in surgery for removal of pulmonary cavitary lesion    No toxicities from antimicrobials  Extremely medically complex  Patient is high risk for infectious complications given medical comorbidities    Plan:     Submitted e-consult to [facility] for additional insight on regimen. Previously have submitted referral for patient to go there if he chooses.     Arikayce and clofazamine is not an adequate regimen on it's own; need another agent. Pause treatment until we can get approved     Stressed importance of airway clearance. continue Aerobika and nebulized hypertonic saline bid.      Prevent reflux- avoid stomach sleeping. Famotidine/tums. Stop liquids 3hr before bedtime     Monitor afb sputum monthly     Repeat CT chest 6 months    - Will monitor drug therapy for toxicity      I have sent " communication to the referring physician and/or primary care provider.     I spent a total of 50 minutes on the day of the visit. This includes face to face time and non-face to face time preparing to see the patient (eg, review of tests), obtaining and/or reviewing separately obtained history, documenting clinical information in the electronic or other health record, independently interpreting results, and communicating results to the patient/family/caregiver, or care coordination.      Paola Rao MD, MPH  Infectious Disease

## 2023-10-23 NOTE — PROGRESS NOTES
[facility]  Speech Therapy Treatment Note- Neurological Rehabilitation  Date: 10/24/2023     Name: Diego Gunter   MRN: [id number]   Therapy Diagnosis:   Encounter Diagnosis   Name Primary?    Dysphagia, unspecified type    Physician: Ayesha Hall MD  Physician Orders: Ambulatory Referral to Speech Therapy   Medical Diagnosis: Dysphagia, unspecified type [R13.10], Throat clearing [R09.89]    Visit #/ Visits Authorized: 1/ 24  Date of Evaluation:  10/16/2023  Insurance Authorization Period: 09/25/2023 - 09/24/2024  Plan of Care Expiration Date:  12/11/2023  Extended Plan of Care:  n/a   Progress Note: 11/20/2023     Time In:  9:30  Time Out:  10:15  Total Billable Time: 45     Precautions: Standard, Fall, and Dysphagia  Subjective:   Patient reports: Patient pleasant. He attended session with wife present.   He was compliant to home exercise program.   Response to previous treatment: fair-good  Pain Scale: no pain indicated throughout session  Objective:       UNTIMED  Procedure   Dysphagia Therapy     Short Term Goals: (4 weeks) Current Progress:   Patient will participate in tongue base retraction exercises (I.e., hard effortful swallow) 75-95 times given minimum cues to improve tongue base retraction and overall swallow function.     Progressing/ Not Met 10/24/2023   Patient completed x65 effortful swallows. Patient demonstrated x4 s/s of aspiration on thin liquids  cleared by throat clear and delayed cough   2. Patient will participate in chin tuck against resistance (CTAR) hold for 60 seconds for 3 sets given minimum cues to improve hyolaryngeal elevation / excursion and overall swallow function.     Progressing/ Not Met 10/24/2023   Patient completed 3 CTAR          Met x1   3.  Patient will participate in chin tuck against resistance (CTAR) repetitions 30-45 times given minimum cues to improve hyolaryngeal elevation / excursion and overall swallow function.    Progressing/ Not Met  10/24/2023   Patient completed x30 CTAR given minimal cues         Met x1   4.  Patient will recall and utilize aspiration precautions and safe swallow strategies independently     Progressing/ Not Met 10/24/2023   ST educated patient on aspiration precautions and safe swallow strategies.     Met x1       Long Term Goals (8 weeks):   1. He  will maintain adequate hydration/nutrition with optimum safety and efficiency of swallowing function on PO intake without overt signs and symptoms of aspiration for The International Dysphagia Diet Standardisation Initiative level 0 / The International Dysphagia Diet Standardisation Initiative level 7.        2. He  will utilize compensatory strategies with optimum safety and efficiency of swallowing function on PO intake without overt signs and symptoms of aspiration for The International Dysphagia Diet Standardisation Initiative level 0 / The International Dysphagia Diet Standardisation Initiative level 7.  Patient Education/Response:   Patient educated regarding the followin. Reviewed results of assessment  2. Compensatory strategies Effortful swallow  3. Chin-Tuck Against Resistance   4. Aspiration pneumonia precautions and safe swallow strategies  Patient verbalized understanding    Home program established: yes-utilize compensatory strategies to improve swallowing efficiency and safety   Patient verbalized understanding to all above education provided.     See Electronic Medical Record under Patient Instructions for exercises provided throughout therapy.  Assessment:   Diego is progressing well towards his goals. Patient was pleasant and demonstrated understanding of strategies as he has utilized them in the past. Patient with x4 s/s of aspiration that were spontaneously cleared by throat clearing and delayed cough. ST encouraged patient to reduce rate in which he swallows thin liquid patient seen with reduced s/s of aspiration upon request. Current goals remain  appropriate. Goals to be updated as necessary.     Patient prognosis is Guarded. Patient will continue to benefit from skilled outpatient speech and language therapy to address the deficits listed in the problem list on initial evaluation, provide patient/family education and to maximize patient's level of independence in the home and community environment.   Medical necessity is demonstrated by the following IMPAIRMENTS:  Dysphagia: Impaired swallow physiology results in decreased efficiency and effectiveness of oral intake.    Barriers to Therapy: None  Patient's spiritual, cultural and educational needs considered and patient agreeable to plan of care and goals.  Plan:   Continue Plan of Care with focus on rehabilitation and compensation for dysphagia therapy    Saida Bonilla CCC-SLP   10/24/2023

## 2023-10-24 ENCOUNTER — CLINICAL SUPPORT (OUTPATIENT)
Dept: REHABILITATION | Facility: HOSPITAL | Age: 83
End: 2023-10-24
Payer: MEDICARE

## 2023-10-24 DIAGNOSIS — R13.10 DYSPHAGIA, UNSPECIFIED TYPE: ICD-10-CM

## 2023-10-24 PROCEDURE — 92526 ORAL FUNCTION THERAPY: CPT | Mod: PN

## 2023-10-26 ENCOUNTER — OFFICE VISIT (OUTPATIENT)
Dept: NEUROLOGY | Facility: CLINIC | Age: 83
End: 2023-10-26
Payer: MEDICARE

## 2023-10-26 ENCOUNTER — LAB VISIT (OUTPATIENT)
Dept: LAB | Facility: HOSPITAL | Age: 83
End: 2023-10-26
Payer: MEDICARE

## 2023-10-26 VITALS
WEIGHT: 160.94 LBS | HEART RATE: 64 BPM | SYSTOLIC BLOOD PRESSURE: 108 MMHG | BODY MASS INDEX: 23.09 KG/M2 | DIASTOLIC BLOOD PRESSURE: 74 MMHG

## 2023-10-26 DIAGNOSIS — R13.12 OROPHARYNGEAL DYSPHAGIA: Primary | ICD-10-CM

## 2023-10-26 DIAGNOSIS — S06.4XAA EPIDURAL HEMATOMA: ICD-10-CM

## 2023-10-26 DIAGNOSIS — R13.12 OROPHARYNGEAL DYSPHAGIA: ICD-10-CM

## 2023-10-26 DIAGNOSIS — M25.611 DECREASED RANGE OF MOTION OF RIGHT SHOULDER: ICD-10-CM

## 2023-10-26 DIAGNOSIS — S14.9XXA INJURY OF UNSPECIFIED NERVES OF NECK, INITIAL ENCOUNTER: ICD-10-CM

## 2023-10-26 PROCEDURE — 1159F MED LIST DOCD IN RCRD: CPT | Mod: CPTII,GC,S$GLB, | Performed by: PSYCHIATRY & NEUROLOGY

## 2023-10-26 PROCEDURE — 99214 OFFICE O/P EST MOD 30 MIN: CPT | Mod: GC,S$GLB,, | Performed by: PSYCHIATRY & NEUROLOGY

## 2023-10-26 PROCEDURE — 3288F PR FALLS RISK ASSESSMENT DOCUMENTED: ICD-10-PCS | Mod: CPTII,GC,S$GLB, | Performed by: PSYCHIATRY & NEUROLOGY

## 2023-10-26 PROCEDURE — 1126F AMNT PAIN NOTED NONE PRSNT: CPT | Mod: CPTII,GC,S$GLB, | Performed by: PSYCHIATRY & NEUROLOGY

## 2023-10-26 PROCEDURE — 1101F PR PT FALLS ASSESS DOC 0-1 FALLS W/OUT INJ PAST YR: ICD-10-PCS | Mod: CPTII,GC,S$GLB, | Performed by: PSYCHIATRY & NEUROLOGY

## 2023-10-26 PROCEDURE — 1126F PR PAIN SEVERITY QUANTIFIED, NO PAIN PRESENT: ICD-10-PCS | Mod: CPTII,GC,S$GLB, | Performed by: PSYCHIATRY & NEUROLOGY

## 2023-10-26 PROCEDURE — 1159F PR MEDICATION LIST DOCUMENTED IN MEDICAL RECORD: ICD-10-PCS | Mod: CPTII,GC,S$GLB, | Performed by: PSYCHIATRY & NEUROLOGY

## 2023-10-26 PROCEDURE — 83519 RIA NONANTIBODY: CPT | Mod: 59

## 2023-10-26 PROCEDURE — 1157F ADVNC CARE PLAN IN RCRD: CPT | Mod: CPTII,GC,S$GLB, | Performed by: PSYCHIATRY & NEUROLOGY

## 2023-10-26 PROCEDURE — 3288F FALL RISK ASSESSMENT DOCD: CPT | Mod: CPTII,GC,S$GLB, | Performed by: PSYCHIATRY & NEUROLOGY

## 2023-10-26 PROCEDURE — 36415 COLL VENOUS BLD VENIPUNCTURE: CPT

## 2023-10-26 PROCEDURE — 99999 PR PBB SHADOW E&M-EST. PATIENT-LVL III: ICD-10-PCS | Mod: PBBFAC,GC,,

## 2023-10-26 PROCEDURE — 3078F DIAST BP <80 MM HG: CPT | Mod: CPTII,GC,S$GLB, | Performed by: PSYCHIATRY & NEUROLOGY

## 2023-10-26 PROCEDURE — 1157F PR ADVANCE CARE PLAN OR EQUIV PRESENT IN MEDICAL RECORD: ICD-10-PCS | Mod: CPTII,GC,S$GLB, | Performed by: PSYCHIATRY & NEUROLOGY

## 2023-10-26 PROCEDURE — 99214 PR OFFICE/OUTPT VISIT, EST, LEVL IV, 30-39 MIN: ICD-10-PCS | Mod: GC,S$GLB,, | Performed by: PSYCHIATRY & NEUROLOGY

## 2023-10-26 PROCEDURE — 3074F SYST BP LT 130 MM HG: CPT | Mod: CPTII,GC,S$GLB, | Performed by: PSYCHIATRY & NEUROLOGY

## 2023-10-26 PROCEDURE — 1101F PT FALLS ASSESS-DOCD LE1/YR: CPT | Mod: CPTII,GC,S$GLB, | Performed by: PSYCHIATRY & NEUROLOGY

## 2023-10-26 PROCEDURE — 99999 PR PBB SHADOW E&M-EST. PATIENT-LVL III: CPT | Mod: PBBFAC,GC,,

## 2023-10-26 PROCEDURE — 3078F PR MOST RECENT DIASTOLIC BLOOD PRESSURE < 80 MM HG: ICD-10-PCS | Mod: CPTII,GC,S$GLB, | Performed by: PSYCHIATRY & NEUROLOGY

## 2023-10-26 PROCEDURE — 83519 RIA NONANTIBODY: CPT

## 2023-10-26 PROCEDURE — 3074F PR MOST RECENT SYSTOLIC BLOOD PRESSURE < 130 MM HG: ICD-10-PCS | Mod: CPTII,GC,S$GLB, | Performed by: PSYCHIATRY & NEUROLOGY

## 2023-10-26 NOTE — PROGRESS NOTES
"[facility] - NEUROLOGY [facility]TH [place] [facility] [place]    Date: 10/26/23  Patient Name: Diego Gunter   MRN: [id number]   PCP: Portia Ferreira  Referring Provider: Ayesha Hall MD    Assessment:   Diego Gunter is a 83 y.o. male presenting with  dysphagia since [id number]. It did improve after therapy. He had a C3-6 PCF cervical epidural hematoma surgery 4/2022 with Dr. Miller, followed by a month of rehab. However, for the past 6 months or longer, he has noticed his swallowing difficulties returning. PMHx includes prostate cancer (2011), centrilobular emphysema, pulmonary Mycobacterium avium intracellulare infection, hearing impairment, and GERD.    Swallowing difficulties with solids and per note from speech, also liquids: "aspiration that were spontaneously cleared by throat clearing and delayed cough" ; prognosis guarded. Reportedly, this is due to an oropharyngeal dysfunction. FL Esophagram, showing weakened pharyngeal constriction and mild elicited gastroesophageal reflux: "Weakened pharyngeal constriction/residue.  Instance of flash laryngeal penetration."    Single breath count 18, confounded by baseline lung issues. His wife reports that his volume can sometimes go down as he continues to talk.     There is family history of myasthenia gravis in his brother. There is concern about myasthenia. However, patient has also had prior epidural hematoma in the cervical region which may be responsible for his symptoms, though there are doubts as to whether the injury extended to an area that would localize.    Plan:     -- Myasthenia panel w/ reflex  -- Refer to EMG for evaluation of cervical radiculopathy  -- RTC Dec 7    Problem List Items Addressed This Visit          Neuro    Epidural hematoma    Overview     C3-C6            GI    Oropharyngeal dysphagia - Primary    Overview     - improved with speech therapy         Relevant Orders    Myasthenia Gravis Eval With Musk " Reflex    EMG W/ ULTRASOUND AND NERVE CONDUCTION TEST 2 Extremities       Orthopedic    Decreased range of motion of right shoulder    Relevant Orders    EMG W/ ULTRASOUND AND NERVE CONDUCTION TEST 2 Extremities     Other Visit Diagnoses       Injury of unspecified nerves of neck, initial encounter        Relevant Orders    EMG W/ ULTRASOUND AND NERVE CONDUCTION TEST 2 Extremities            10/26/2023  Quinn Arriola [place] [facility] [place], PGY-3  Department of Neurology  [facility]    Subjective:     Patient seen in consultation at the request of Ayesha Hall MD for the evaluation of dysphagia.     HPI:   Mr. Diego Gunter is a 83 y.o. male presenting with the above complaints, please see assessment & plan for this visit for full HPI.    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 Dr. Pham    Hx of colonic polyps     followed by GI. Dr. Pham    Hyperlipidemia associated with type 2 diabetes mellitus     Hyperlipidemia LDL goal <100     Hypernatremia 05/05/2022    Hypertension associated with type 2 diabetes mellitus     Hypertension associated with type 2 diabetes mellitus     Hypotension     Intermittent confusion 04/29/2022    Overweight(278.02)     Prostate cancer 09/2011    followed by urology, Dr. Rogers    Pulmonary embolism     Reducible right inguinal hernia 10/10/2022    Right hemiparesis 07/22/2022    Secondary to cervical hematoma    Type II or unspecified type diabetes mellitus without mention of complication,  not stated as uncontrolled     diet controlled    Urinary retention 04/29/2022       PAST SURGICAL HISTORY:  Past Surgical History:   Procedure Laterality Date    BRONCHOSCOPY N/A 10/15/2018    Procedure: BRONCHOSCOPY;  Surgeon: [facility] Provider;  Location: [facility] OR 2ND FLR;  Service: Anesthesiology;  Laterality: N/A;    CARPAL TUNNEL RELEASE Right 1/11/2023    Procedure: RELEASE, CARPAL TUNNEL;  Surgeon: Romero Lazo MD;  Location: [facility] OR;  Service: Orthopedics;  Laterality: Right;    CATARACT EXTRACTION, BILATERAL  2014    COLONOSCOPY N/A 5/16/2023    Procedure: COLONOSCOPY;  Surgeon: Buster Sequeira MD;  Location: [facility] ENDO (4TH FLR);  Service: Endoscopy;  Laterality: N/A;    DECOMPRESSION, NERVE, ULNAR Right 1/11/2023    Procedure: DECOMPRESSION, NERVE, ULNAR - right cub jolie and guyon's decompression as well as open right carpal tunnel release;  Surgeon: Romero Lazo MD;  Location: [facility] OR;  Service: Orthopedics;  Laterality: Right;    ESOPHAGOGASTRODUODENOSCOPY N/A 5/16/2023    Procedure: EGD (ESOPHAGOGASTRODUODENOSCOPY);  Surgeon: Buster Sequeira MD;  Location: [facility] ENDO (4TH FLR);  Service: Endoscopy;  Laterality: N/A;  inst mailed-RB  5/10/23 no answer for precall/mleone lpn    POSTERIOR CERVICAL LAMINECTOMY Bilateral 4/28/2022    Procedure: LAMINECTOMY, SPINE, CERVICAL, POSTERIOR APPROACH C3-6;  Surgeon: Carlos Miller MD;  [facility];  Service: Neurosurgery;  Laterality: Bilateral;    PROSTATECTOMY      REPAIR, HERNIA, INGUINAL, WITHOUT HISTORY OF PRIOR REPAIR, AGE 5 YEARS OR OLDER Right 10/10/2022    Procedure: REPAIR, HERNIA, INGUINAL, WITHOUT HISTORY OF PRIOR REPAIR, AGE 5 YEARS OR OLDER;  Surgeon: Dario Esquivel MD;  Location: [facility] OR 2ND FLR;  Service: General;  Laterality: Right;  open right inguinal hernia repair with mesh       CURRENT MEDS:  Current Outpatient Medications   Medication Sig Dispense Refill    ARIKAYCE 590 mg/8.4 mL NbSp       aspirin (ECOTRIN) 81  MG EC tablet Take 81 mg by mouth once daily.      azelastine (ASTELIN) 137 mcg (0.1 %) nasal spray 2 sprays (274 mcg total) by Nasal route 2 (two) times daily. 30 mL 0    b complex vitamins capsule Take 1 capsule by mouth once daily.      nebulizer and compressor ("[employer] COMPRESSOR SYSTEM) Marcy use to administer nebulized medication as directed 1 each 0    pantoprazole (PROTONIX) 40 MG tablet Take 1 tablet (40 mg total) by mouth once daily. Take 30 minutes before breakfast 30 tablet 2    pravastatin (PRAVACHOL) 10 MG tablet Take 1 tablet (10 mg total) by mouth every evening. 90 tablet 1    sodium chloride 3% 3 % nebulizer solution USE 4 ML VIAL IN NEBULIZER  TWICE DAILY 480 mL 11    sodium chloride 7% 7 % nebulizer solution use one vial in nebulizer twice a day 500 mL 11    albuterol (VENTOLIN HFA) 90 mcg/actuation inhaler Inhale 2 puffs into the lungs daily as needed (30 min before arikayce). Rescue (Patient not taking: Reported on 2023) 18 g 11     No current facility-administered medications for this visit.       ALLERGIES:  Review of patient's allergies indicates:  No Known Allergies    FAMILY HISTORY:  Family History   Problem Relation Age of Onset    Colon cancer Mother     Diabetes Mother     Heart disease Father     Mental illness Sister     Diabetes Sister     Other Brother         polio as a child    Pulmonary fibrosis Brother     Diabetes Brother     Myasthenia gravis Brother     Parkinsonism Brother     Diabetes Brother     Dementia Brother     Lung cancer Brother         smoker    Diabetes Maternal Aunt     Diabetes Other     Esophageal cancer Neg Hx        SOCIAL HISTORY:  Social History     Tobacco Use    Smoking status: Former     Current packs/day: 0.00     Average packs/day: 0.3 packs/day for 5.0 years (1.3 ttl pk-yrs)     Types: Cigarettes     Start date: 10/2/1957     Quit date: 10/2/1962     Years since quittin.1    Smokeless tobacco: Former    Tobacco comments:     quit age 23    Substance Use Topics    Alcohol use: Not Currently    Drug use: No       Review of Systems:  12 system review of systems is negative except for the symptoms mentioned in HPI.        Objective:     Vitals:    10/26/23 0758   BP: 108/74   Pulse: 64   Weight: 73 kg (160 lb 15 oz)     General: NAD, well nourished   Eyes: no tearing, discharge, no erythema   ENT: moist mucous membranes of the oral cavity, nares patent    Neck: Supple, full range of motion  Cardiovascular: Warm and well perfused, pulses equal and symmetrical  Lungs: Normal work of breathing, normal chest wall excursions  Skin: No rash, lesions, or breakdown on exposed skin  Psychiatry: Mood and affect are appropriate   Abdomen: soft, non tender, non distended  Extremeties: No cyanosis, clubbing or edema.    Neurological Exam:  MENTAL STATUS  Level of consciousness: alert  Orientation: oriented to person, place, and time  Attention normal. Concentration normal.  Speech: abnormalities noted in vocalization, reduced volume and clarity of speech; confounded by patient's reporting that he sometimes has difficulty with expressive aphasia    CRANIAL NERVES  CN II: Visual fields full to confrontation  CN III, IV, VI: PERRL, EOMI; some fatigability noted on upward gaze  CN V: Facial sensation intact  CN VII: Facial expression symmetric and full  CN VIII: Hearing intact to finger rub  CN IX, X: Symmetric palate elevation. Phonation normal  CN XI: Shoulder shrug (limited by pain on the right) and head turn intact bilaterally normal  CN XII: Tongue midline with normal movements, no atrophy    MOTOR EXAM  Muscle bulk: normal  Muscle tone: normal  Pronator drift: absent    Strength - Upper Extremities   Arm abduction Elbow flexion Elbow extension Wrist flexion Wrist extension Finger abduction   Right 5/5 5/5 5/5 5/5 5/5 5/5   Left 5/5 5/5 5/5 5/5 5/5 5/5     Strength - Lower Extremities   Hip flexion Knee flexion Knee extension Dorsiflexion Plantarflexion   Right 5/5  5/5 5/5 5/5 5/5   Left 5/5 5/5 5/5 5/5 5/5       REFLEXES   Biceps Triceps Brachioradialis Patellar Achilles   Right +2 +2 +2 +2 +2   Left +2 +2 +2 +2 +2     Toes down bilaterally    SENSORY EXAM  Light touch: intact in all 4 extremities    COORDINATION  Finger to nose: normal  Gait steady with normal arm swing, base, and turning  Romberg negative

## 2023-10-30 NOTE — PROGRESS NOTES
[facility]  Speech Therapy Treatment Note- Neurological Rehabilitation  Date: 10/31/2023     Name: Diego Gunter   MRN: [id number]   Therapy Diagnosis:   Encounter Diagnosis   Name Primary?    Dysphagia, unspecified type Yes   Physician: Ayesha Hall MD  Physician Orders: Ambulatory Referral to Speech Therapy   Medical Diagnosis: Dysphagia, unspecified type [R13.10], Throat clearing [R09.89]    Visit #/ Visits Authorized: 2/ 24  Date of Evaluation:  10/16/2023  Insurance Authorization Period: 09/25/2023 - 09/24/2024  Plan of Care Expiration Date:  12/11/2023  Extended Plan of Care:  n/a   Progress Note: 11/20/2023     Time In:  10:25  Time Out:  10:15  Total Billable Time: 45     Precautions: Standard, Fall, and Dysphagia  Subjective:   Patient reports: Patient pleasant. He attended session independently. Patient shared I have noticed changes in my ability to speak and process information. ST sent neurologist that patient visited with on 10/26/2023 message to f/u with patient get scheduled for testing.    He was compliant to home exercise program.   Response to previous treatment: fair-good  Pain Scale: no pain indicated throughout session  Objective:       UNTIMED  Procedure   Dysphagia Therapy     Short Term Goals: (4 weeks) Current Progress:   Patient will participate in tongue base retraction exercises (I.e., hard effortful swallow) 75-95 times given minimum cues to improve tongue base retraction and overall swallow function.     Progressing/ Not Met 10/31/2023   Patient completed x75 effortful swallows. Patient demonstrated x2 s/s of aspiration on thin liquids  cleared by throat clear and delayed cough   2. Patient will participate in chin tuck against resistance (CTAR) hold for 60 seconds for 3 sets given minimum cues to improve hyolaryngeal elevation / excursion and overall swallow function.     Progressing/ Not Met 10/31/2023   Patient completed 3 CTAR          Met x2   3.  Patient  will participate in chin tuck against resistance (CTAR) repetitions 30-45 times given minimum cues to improve hyolaryngeal elevation / excursion and overall swallow function.    Progressing/ Not Met 10/31/2023   Patient completed x35 CTAR given minimal cues         Met x2   4.  Patient will recall and utilize aspiration precautions and safe swallow strategies independently     Progressing/ Not Met 10/31/2023   ST educated patient on aspiration precautions and safe swallow strategies.     Met x2       Long Term Goals (8 weeks):   1. He  will maintain adequate hydration/nutrition with optimum safety and efficiency of swallowing function on PO intake without overt signs and symptoms of aspiration for The International Dysphagia Diet Standardisation Initiative level 0 / The International Dysphagia Diet Standardisation Initiative level 7.        2. He  will utilize compensatory strategies with optimum safety and efficiency of swallowing function on PO intake without overt signs and symptoms of aspiration for The International Dysphagia Diet Standardisation Initiative level 0 / The International Dysphagia Diet Standardisation Initiative level 7.  Patient Education/Response:   Patient educated regarding the followin. Compensatory strategies Effortful swallow  2. Chin-Tuck Against Resistance   3. Aspiration pneumonia precautions and safe swallow strategies  4. Importance of following up with Neurologist  Patient verbalized understanding    Home program established: yes-utilize compensatory strategies to improve swallowing efficiency and safety   Patient verbalized understanding to all above education provided.     See Electronic Medical Record under Patient Instructions for exercises provided throughout therapy.  Assessment:   Diego is progressing well towards his goals. Patient was pleasant, he attended session independently. Patient with x2 s/s of aspiration that were spontaneously cleared by throat clearing and  delayed cough. ST encouraged patient to reduce rate in which he swallows thin liquid patient seen with reduced s/s of aspiration upon request. Patient shared reduced processing speed and difficulties speaking. ST messaged Dr. Arriola to share ST findings (reduced processing, vocal intensity, swallow impairments, suspected cognitive deficits. ST hopeful to have clarity from MD to see how to proceed with outpatient ST. Current goals remain appropriate. Goals to be updated as necessary.     Patient prognosis is Guarded. Patient will continue to benefit from skilled outpatient speech and language therapy to address the deficits listed in the problem list on initial evaluation, provide patient/family education and to maximize patient's level of independence in the home and community environment.   Medical necessity is demonstrated by the following IMPAIRMENTS:  Dysphagia: Impaired swallow physiology results in decreased efficiency and effectiveness of oral intake.    Barriers to Therapy: None  Patient's spiritual, cultural and educational needs considered and patient agreeable to plan of care and goals.  Plan:   Continue Plan of Care with focus on rehabilitation and compensation for dysphagia therapy    Saida Bonilla CCC-SLP   10/31/2023

## 2023-10-31 ENCOUNTER — CLINICAL SUPPORT (OUTPATIENT)
Dept: REHABILITATION | Facility: HOSPITAL | Age: 83
End: 2023-10-31
Payer: MEDICARE

## 2023-10-31 DIAGNOSIS — R13.10 DYSPHAGIA, UNSPECIFIED TYPE: Primary | ICD-10-CM

## 2023-10-31 LAB
ACHR BIND AB SER-SCNC: 0 NMOL/L
MG INTERPRETIVE COMMENTS: NORMAL

## 2023-10-31 PROCEDURE — 92526 ORAL FUNCTION THERAPY: CPT | Mod: PN

## 2023-10-31 NOTE — PROGRESS NOTES
Patient examined, record reviewed, agree with findings and recommendations as documented above.   12-Mar-2022 12:03

## 2023-11-02 NOTE — PROGRESS NOTES
"[facility]  Speech Therapy Treatment Note- Neurological Rehabilitation  Date: 11/6/2023     Name: Diego Gunter   MRN: [id number]   Therapy Diagnosis:   Encounter Diagnosis   Name Primary?    Dysphagia, unspecified type Yes   Physician: Ayesha Hall MD  Physician Orders: Ambulatory Referral to Speech Therapy   Medical Diagnosis: Dysphagia, unspecified type [R13.10], Throat clearing [R09.89]    Visit #/ Visits Authorized: 3/ 24  Date of Evaluation:  10/16/2023  Insurance Authorization Period: 09/25/2023 - 09/24/2024  Plan of Care Expiration Date:  12/11/2023  Extended Plan of Care:  n/a   Progress Note: 11/20/2023     Time In:  10:25  Time Out:  10:15  Total Billable Time: 45     Precautions: Standard, Fall, and Dysphagia  Subjective:   Patient reports: Patient complaint of not talking well but as ST probed he actually meant his attention, thinking, and processing is slower than typical and makes it difficult to speak and complete daily task. ST suggest possible cognitive linguistic testing. Patient agreed and verbalized,"please because I feel like my swallowing is improving."  He was compliant to home exercise program.   Response to previous treatment: fair-good  Pain Scale: no pain indicated throughout session  Objective:       UNTIMED  Procedure   Dysphagia Therapy     Short Term Goals: (4 weeks) Current Progress:   Patient will participate in tongue base retraction exercises (I.e., hard effortful swallow) 75-95 times given minimum cues to improve tongue base retraction and overall swallow function.     Progressing/ Not Met 11/6/2023   Patient shared "I have not had any problems in the last few weeks".    I have bought some apple sauce and peaches to practice effortful swallowing exercise.    Patient wife shared great improvements in patients ability to swallow.   2. Patient will participate in chin tuck against resistance (CTAR) hold for 60 seconds for 3 sets given minimum cues to " improve hyolaryngeal elevation / excursion and overall swallow function.     Progressing/ Not Met 11/6/2023   Patient remains compliant to home exercise program.        Met x2   3.  Patient will participate in chin tuck against resistance (CTAR) repetitions 30-45 times given minimum cues to improve hyolaryngeal elevation / excursion and overall swallow function.    Progressing/ Not Met 11/6/2023   Patient remains compliant to home exercise program.          Met x2   4.  Patient will recall and utilize aspiration precautions and safe swallow strategies independently     Progressing/ Not Met 11/6/2023   Patient verbalized understanding of aspiration pneumonia precautions and safe swallow strategies.      Goal Met 11/6/2023      5. Patient will participate in cognitive linguistic quick test to assess cognitive functioning.  Completed 11/6/2023    Goal Met 11/6/2023        Cognitive Linguistic Quick Test (CLQT) was administered to quickly assess the patient's overall cognitive-linguistic function and to determine cognitive strengths and weaknesses.           Cognitive Domain Score     Severity Rating      Attention    109 / 215 mild  Memory    96 / 185 moderate  Executive Functions   5 / 40  severe  Language    23 / 37 moderate  Visuospatial Skills    34 / 105 moderate  Clock Drawing    5 / 13  severe    Composite Severity Rating  1.83 / 4.0 moderate    Task Score Ages 70-89 Cut Score Below?   Personal Facts  8  8 average   Symbol Cancellation 0  10 below average   Confrontational naming  10  10 average   Clock drawing  5  11 below average   Story Retelling 3  5 below average   Symbol Trails 3  6 below average   Generative Naming 2  4 below average   Design Memory 2  4 below average   Mazes 0  4 below average   Design Generation  0  5 below average        Long Term Goals (8 weeks):   1. He  will maintain adequate hydration/nutrition with optimum safety and efficiency of swallowing function on PO intake without overt  signs and symptoms of aspiration for The International Dysphagia Diet Standardisation Initiative level 0 / The International Dysphagia Diet Standardisation Initiative level 7.        2. He  will utilize compensatory strategies with optimum safety and efficiency of swallowing function on PO intake without overt signs and symptoms of aspiration for The International Dysphagia Diet Standardisation Initiative level 0 / The International Dysphagia Diet Standardisation Initiative level 7.  Patient Education/Response:   Patient educated regarding the followin.  Importance of following up with Neurologist or Neuropsychologist  2. Remain compliant to home exercise program  3. Place patient on hold until patient addresses severe cognitive deficits with MD.  Patient verbalized understanding    Home program established: yes-utilize compensatory strategies to improve swallowing efficiency and safety   Patient verbalized understanding to all above education provided.     See Electronic Medical Record under Patient Instructions for exercises provided throughout therapy.  Assessment:   Diego is progressing well towards his goals. Patient expressed improvement in swallowing but shared difficulties in speaking, secondary to attention, reduced processing and difficulties in thinking. Patient partook in cognitive linguistic quick test. Patient scored moderately severe.  Note- patient required consistent repetition of instructions and demonstrated confusion throughout entirety of assessment. ST reviewed assessment results with patient and patients wife to aid in suggesting next steps of whom to follow up with, in hopes of retrieving a diagnosis of medical presentation. Patient wife appreciated in depth discussion. ST went through patients family history and see that siblings have/had myasthenia gravis, parkinsonism, dementia and etc. ST is placing patient therapy on pause until receiving learning more about current medical  condition so that ST can treat and educate patient appropriately. Patient and patients wife agreed and are happy with ST plan. Patient Current goals remain appropriate. Goals to be updated as necessary.     Patient prognosis is Guarded. Patient will continue to benefit from skilled outpatient speech and language therapy to address the deficits listed in the problem list on initial evaluation, provide patient/family education and to maximize patient's level of independence in the home and community environment.   Medical necessity is demonstrated by the following IMPAIRMENTS:  Dysphagia: Impaired swallow physiology results in decreased efficiency and effectiveness of oral intake.    Barriers to Therapy: None  Patient's spiritual, cultural and educational needs considered and patient agreeable to plan of care and goals.  Plan:   Continue Plan of Care with focus on rehabilitation and compensation for dysphagia therapy    Saida Bonilla CCC-SLP   11/6/2023

## 2023-11-06 ENCOUNTER — TELEPHONE (OUTPATIENT)
Dept: NEUROLOGY | Facility: CLINIC | Age: 83
End: 2023-11-06
Payer: MEDICARE

## 2023-11-06 ENCOUNTER — CLINICAL SUPPORT (OUTPATIENT)
Dept: REHABILITATION | Facility: HOSPITAL | Age: 83
End: 2023-11-06
Payer: MEDICARE

## 2023-11-06 ENCOUNTER — LAB VISIT (OUTPATIENT)
Dept: LAB | Facility: HOSPITAL | Age: 83
End: 2023-11-06
Attending: INTERNAL MEDICINE
Payer: MEDICARE

## 2023-11-06 DIAGNOSIS — A31.0 MYCOBACTERIUM AVIUM-INTRACELLULARE COMPLEX: ICD-10-CM

## 2023-11-06 DIAGNOSIS — R13.10 DYSPHAGIA, UNSPECIFIED TYPE: Primary | ICD-10-CM

## 2023-11-06 LAB — MUSK ANTIBODY TEST: 0 NMOL/L (ref 0–0.02)

## 2023-11-06 PROCEDURE — 87206 SMEAR FLUORESCENT/ACID STAI: CPT | Performed by: INTERNAL MEDICINE

## 2023-11-06 PROCEDURE — 87118 MYCOBACTERIC IDENTIFICATION: CPT | Performed by: INTERNAL MEDICINE

## 2023-11-06 PROCEDURE — 87118 MYCOBACTERIC IDENTIFICATION: CPT | Mod: 59 | Performed by: INTERNAL MEDICINE

## 2023-11-06 PROCEDURE — 96125 COGNITIVE TEST BY HC PRO: CPT | Mod: PN

## 2023-11-06 PROCEDURE — 87015 SPECIMEN INFECT AGNT CONCNTJ: CPT | Performed by: INTERNAL MEDICINE

## 2023-11-06 PROCEDURE — 87118 MYCOBACTERIC IDENTIFICATION: CPT

## 2023-11-06 PROCEDURE — 87150 DNA/RNA AMPLIFIED PROBE: CPT

## 2023-11-06 PROCEDURE — 87116 MYCOBACTERIA CULTURE: CPT | Performed by: INTERNAL MEDICINE

## 2023-11-06 NOTE — TELEPHONE ENCOUNTER
----- Message from Sharron Dawson sent at 11/6/2023  3:38 PM CST -----  Regarding: [id number]  Type: Patient Call Back    Who called: nellie Mancini    What is the request in detail: she states she has never heard james in regards to the pt results and she stated that the therapist the doctor referred thew pt to can not do anything because she states she does not know the pt diagnosis     Can the clinic reply by [facility]?no     Would the patient rather a call back or a response via [facility]? Call back    Best call back number:[phone]      Additional Information:

## 2023-11-24 ENCOUNTER — HOSPITAL ENCOUNTER (EMERGENCY)
Facility: HOSPITAL | Age: 83
Discharge: PSYCHIATRIC OR SUBSTANCE USE FACILITY | End: 2023-11-24
Attending: EMERGENCY MEDICINE
Payer: MEDICARE

## 2023-11-24 VITALS
BODY MASS INDEX: 23.19 KG/M2 | RESPIRATION RATE: 16 BRPM | HEART RATE: 56 BPM | WEIGHT: 162 LBS | SYSTOLIC BLOOD PRESSURE: 121 MMHG | HEIGHT: 70 IN | TEMPERATURE: 98 F | OXYGEN SATURATION: 97 % | DIASTOLIC BLOOD PRESSURE: 67 MMHG

## 2023-11-24 DIAGNOSIS — Z00.8 MEDICAL CLEARANCE FOR PSYCHIATRIC ADMISSION: ICD-10-CM

## 2023-11-24 DIAGNOSIS — R45.850 HOMICIDAL IDEATIONS: Primary | ICD-10-CM

## 2023-11-24 LAB
ALBUMIN SERPL BCP-MCNC: 3.8 G/DL (ref 3.5–5.2)
ALP SERPL-CCNC: 71 U/L (ref 55–135)
ALT SERPL W/O P-5'-P-CCNC: 16 U/L (ref 10–44)
AMPHET+METHAMPHET UR QL: NEGATIVE
ANION GAP SERPL CALC-SCNC: 9 MMOL/L (ref 8–16)
APAP SERPL-MCNC: <3 UG/ML (ref 10–20)
AST SERPL-CCNC: 21 U/L (ref 10–40)
BARBITURATES UR QL SCN>200 NG/ML: NEGATIVE
BASOPHILS # BLD AUTO: 0.05 K/UL (ref 0–0.2)
BASOPHILS NFR BLD: 0.7 % (ref 0–1.9)
BENZODIAZ UR QL SCN>200 NG/ML: NEGATIVE
BILIRUB SERPL-MCNC: 0.4 MG/DL (ref 0.1–1)
BILIRUB UR QL STRIP: NEGATIVE
BUN SERPL-MCNC: 12 MG/DL (ref 8–23)
BZE UR QL SCN: NEGATIVE
CALCIUM SERPL-MCNC: 9.2 MG/DL (ref 8.7–10.5)
CANNABINOIDS UR QL SCN: NEGATIVE
CHLORIDE SERPL-SCNC: 108 MMOL/L (ref 95–110)
CLARITY UR REFRACT.AUTO: CLEAR
CO2 SERPL-SCNC: 28 MMOL/L (ref 23–29)
COLOR UR AUTO: ABNORMAL
CREAT SERPL-MCNC: 0.9 MG/DL (ref 0.5–1.4)
CREAT UR-MCNC: 26 MG/DL (ref 23–375)
DIFFERENTIAL METHOD: ABNORMAL
EOSINOPHIL # BLD AUTO: 0.1 K/UL (ref 0–0.5)
EOSINOPHIL NFR BLD: 1.9 % (ref 0–8)
ERYTHROCYTE [DISTWIDTH] IN BLOOD BY AUTOMATED COUNT: 14.1 % (ref 11.5–14.5)
EST. GFR  (NO RACE VARIABLE): >60 ML/MIN/1.73 M^2
ETHANOL SERPL-MCNC: <10 MG/DL
GLUCOSE SERPL-MCNC: 75 MG/DL (ref 70–110)
GLUCOSE UR QL STRIP: NEGATIVE
HCT VFR BLD AUTO: 41.1 % (ref 40–54)
HGB BLD-MCNC: 13.1 G/DL (ref 14–18)
HGB UR QL STRIP: NEGATIVE
IMM GRANULOCYTES # BLD AUTO: 0.03 K/UL (ref 0–0.04)
IMM GRANULOCYTES NFR BLD AUTO: 0.4 % (ref 0–0.5)
KETONES UR QL STRIP: NEGATIVE
LEUKOCYTE ESTERASE UR QL STRIP: NEGATIVE
LYMPHOCYTES # BLD AUTO: 1.5 K/UL (ref 1–4.8)
LYMPHOCYTES NFR BLD: 21.6 % (ref 18–48)
MCH RBC QN AUTO: 28.1 PG (ref 27–31)
MCHC RBC AUTO-ENTMCNC: 31.9 G/DL (ref 32–36)
MCV RBC AUTO: 88 FL (ref 82–98)
METHADONE UR QL SCN>300 NG/ML: NEGATIVE
MONOCYTES # BLD AUTO: 0.7 K/UL (ref 0.3–1)
MONOCYTES NFR BLD: 10.5 % (ref 4–15)
NEUTROPHILS # BLD AUTO: 4.4 K/UL (ref 1.8–7.7)
NEUTROPHILS NFR BLD: 64.9 % (ref 38–73)
NITRITE UR QL STRIP: NEGATIVE
NRBC BLD-RTO: 0 /100 WBC
OPIATES UR QL SCN: NEGATIVE
PCP UR QL SCN>25 NG/ML: NEGATIVE
PH UR STRIP: 6 [PH] (ref 5–8)
PLATELET # BLD AUTO: 258 K/UL (ref 150–450)
PMV BLD AUTO: 10.4 FL (ref 9.2–12.9)
POTASSIUM SERPL-SCNC: 4.1 MMOL/L (ref 3.5–5.1)
PROT SERPL-MCNC: 7.1 G/DL (ref 6–8.4)
PROT UR QL STRIP: NEGATIVE
RBC # BLD AUTO: 4.67 M/UL (ref 4.6–6.2)
SODIUM SERPL-SCNC: 145 MMOL/L (ref 136–145)
SP GR UR STRIP: <1.005 (ref 1–1.03)
TOXICOLOGY INFORMATION: NORMAL
TSH SERPL DL<=0.005 MIU/L-ACNC: 2.91 UIU/ML (ref 0.4–4)
URN SPEC COLLECT METH UR: ABNORMAL
WBC # BLD AUTO: 6.77 K/UL (ref 3.9–12.7)

## 2023-11-24 PROCEDURE — 84443 ASSAY THYROID STIM HORMONE: CPT | Performed by: EMERGENCY MEDICINE

## 2023-11-24 PROCEDURE — 85025 COMPLETE CBC W/AUTO DIFF WBC: CPT | Performed by: EMERGENCY MEDICINE

## 2023-11-24 PROCEDURE — 93005 ELECTROCARDIOGRAM TRACING: CPT

## 2023-11-24 PROCEDURE — 81003 URINALYSIS AUTO W/O SCOPE: CPT | Mod: 59 | Performed by: EMERGENCY MEDICINE

## 2023-11-24 PROCEDURE — 99285 EMERGENCY DEPT VISIT HI MDM: CPT | Mod: 25

## 2023-11-24 PROCEDURE — 80053 COMPREHEN METABOLIC PANEL: CPT | Performed by: EMERGENCY MEDICINE

## 2023-11-24 PROCEDURE — 93010 EKG 12-LEAD: ICD-10-PCS | Mod: ,,, | Performed by: INTERNAL MEDICINE

## 2023-11-24 PROCEDURE — 93010 ELECTROCARDIOGRAM REPORT: CPT | Mod: ,,, | Performed by: INTERNAL MEDICINE

## 2023-11-24 PROCEDURE — 82077 ASSAY SPEC XCP UR&BREATH IA: CPT | Performed by: EMERGENCY MEDICINE

## 2023-11-24 PROCEDURE — 80307 DRUG TEST PRSMV CHEM ANLYZR: CPT | Performed by: EMERGENCY MEDICINE

## 2023-11-24 PROCEDURE — 80143 DRUG ASSAY ACETAMINOPHEN: CPT | Performed by: EMERGENCY MEDICINE

## 2023-11-24 NOTE — ED NOTES
Pt and his spouse informed of his pending transfer to [facility] in [place]. Pt's spouse given the facility information.

## 2023-11-24 NOTE — ED NOTES
Pt has a signed and scanned PEC at the  with the unit secretary. The environment is safe with all wall objects removed. Pt is in paper scrubs and their belongings are secured in the closet. Bed is locked and in the lowest position. Alexia Sierra is visualizing and charting on the patient. No complaints stated at this time.    Items locked in hallway closet: pants, sweat shirt, t-shirt, shoes, socks. Gray colored necklace with the number 3.

## 2023-11-24 NOTE — PROGRESS NOTES
Pt belongings include: sweatshirt, t-shirt, pants, socks, and tennis shoes  In safe: necklace and ID

## 2023-11-24 NOTE — ED TRIAGE NOTES
"Diego Gunter, an 83 y.o. male presents to the ED complaining of homicidal ideation. He explains that he woke up this morning and had the idea in his mind to kill his wife. He said he got out of bed and went to their den, called his wife and told her not to come into the den that he was afraid he was going to hurt her. When asked if he had a plan he explains that he just "saw knives in the kitchen". States that he thought of this idea previously but "it wasn't as strong". Denies CP, SOB, abd pain, NVD, dizziness. Patient tearful when explaining the story. Calm and cooperative.       LOC: The patient is awake, alert and aware of environment with an appropriate affect, the patient is oriented x 3 and speaking appropriately.   APPEARANCE: Patient appears comfortable and in no acute distress, patient is clean and well groomed.  SKIN: The skin is warm and dry, color consistent with ethnicity.   MUSCULOSKELETAL: Patient moving all extremities spontaneously, no swelling noted.  RESPIRATORY: Airway is open and patent, respirations are spontaneous, patient has a normal effort and rate, no accessory muscle use noted.  CARDIAC: Patient has a normal rate and regular rhythm, no edema noted, capillary refill < 3 seconds.   GASTRO: Soft and non tender to palpation, no distention noted.   : Pt denies any pain or frequency with urination.  NEURO: Pt opens eyes spontaneously, behavior appropriate to situation, follows commands.  PSYCH: +homicidal ideation      Chief Complaint   Patient presents with    Shaking     Woke wife up, shaking, crying , something told me to kill geraldine, wife    Homicidal     Review of patient's allergies indicates:  No Known Allergies  Past Medical History:   Diagnosis Date    Acute encephalopathy     Anemia     Antiphospholipid syndrome 11/19/2021    Carpal tunnel syndrome on right 01/10/2023    Centrilobular emphysema     COPD (chronic obstructive pulmonary disease)     Cubital tunnel syndrome on " right 01/10/2023    Dysphagia     Dysphagia, oropharyngeal 06/17/2016    - improved with speech therapy    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 Dr. Pham    Hx of colonic polyps     followed by GI. Dr. Pham    Hyperlipidemia associated with type 2 diabetes mellitus     Hyperlipidemia LDL goal <100     Hypernatremia 05/05/2022    Hypertension associated with type 2 diabetes mellitus     Hypertension associated with type 2 diabetes mellitus     Hypotension     Intermittent confusion 04/29/2022    Overweight(278.02)     Prostate cancer 09/2011    followed by urology, Dr. Rogers    Pulmonary embolism     Reducible right inguinal hernia 10/10/2022    Right hemiparesis 07/22/2022    Secondary to cervical hematoma    Type II or unspecified type diabetes mellitus without mention of complication, not stated as uncontrolled     diet controlled    Urinary retention 04/29/2022

## 2023-11-24 NOTE — ED NOTES
[facility] Wife to take all personal belongings. All documents including transfer paperwork, PEC, and pertinent documents from this ED visit.

## 2023-11-24 NOTE — ED PROVIDER NOTES
Encounter Date: 11/24/2023       History     Chief Complaint   Patient presents with    Shaking     Woke wife up, shaking, crying , something told me to kill geraldine, wife    Homicidal     83-year-old male with a history of COPD, CAD, diabetes, hypertension, with recent workup for progressive dysphagia of unclear etiology with evaluation by Neurology, now brought in by wife secondary to concerns for homicidal ideations.  Wife not at bedside on initial evaluation.  Patient reports that he woke up this morning and was experiencing command hallucinations that were telling him to kill his wife.  He reports he is never had these before.  He was very concerned so he left the bedroom and turned on the television to watch a Confucianism mass.  He said the command hallucinations then returned.  He is also been told to consider jumping off a bridge.  Patient reports that he has been in his normal state of health recently, denies any recent illnesses, no new medications or change in medications.  It does not seem that patient has any established psychiatric illness or history of dementia syndrome    The history is provided by the patient.     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 Dr. Pham    Hx of colonic polyps     followed by GI. Dr. Pham    Hyperlipidemia associated with type 2 diabetes mellitus     Hyperlipidemia LDL goal <100     Hypernatremia 05/05/2022    Hypertension associated with type 2 diabetes mellitus      Hypertension associated with type 2 diabetes mellitus     Hypotension     Intermittent confusion 04/29/2022    Overweight(278.02)     Prostate cancer 09/2011    followed by urology, Dr. Rogers    Pulmonary embolism     Reducible right inguinal hernia 10/10/2022    Right hemiparesis 07/22/2022    Secondary to cervical hematoma    Type II or unspecified type diabetes mellitus without mention of complication, not stated as uncontrolled     diet controlled    Urinary retention 04/29/2022     Past Surgical History:   Procedure Laterality Date    BRONCHOSCOPY N/A 10/15/2018    Procedure: BRONCHOSCOPY;  Surgeon: [facility] Provider;  Location: [facility] OR 2ND FLR;  Service: Anesthesiology;  Laterality: N/A;    CARPAL TUNNEL RELEASE Right 1/11/2023    Procedure: RELEASE, CARPAL TUNNEL;  Surgeon: Romero Lazo MD;  Location: [facility] OR;  Service: Orthopedics;  Laterality: Right;    CATARACT EXTRACTION, BILATERAL  2014    COLONOSCOPY N/A 5/16/2023    Procedure: COLONOSCOPY;  Surgeon: Buster Sequeira MD;  Location: [facility] ENDO (4TH FLR);  Service: Endoscopy;  Laterality: N/A;    DECOMPRESSION, NERVE, ULNAR Right 1/11/2023    Procedure: DECOMPRESSION, NERVE, ULNAR - right cub jolie and guyon's decompression as well as open right carpal tunnel release;  Surgeon: Romero Lazo MD;  Location: [facility] OR;  Service: Orthopedics;  Laterality: Right;    ESOPHAGOGASTRODUODENOSCOPY N/A 5/16/2023    Procedure: EGD (ESOPHAGOGASTRODUODENOSCOPY);  Surgeon: Buster Sequeira MD;  Location: [facility] (4TH FLR);  Service: Endoscopy;  Laterality: N/A;  inst mailed-RB  5/10/23 no answer for precall/mleone lpn    POSTERIOR CERVICAL LAMINECTOMY Bilateral 4/28/2022    Procedure: LAMINECTOMY, SPINE, CERVICAL, POSTERIOR APPROACH C3-6;  Surgeon: Carlos Miller MD;  Location: [facility] OR 2ND FLR;  Service: Neurosurgery;  Laterality: Bilateral;    PROSTATECTOMY      REPAIR, HERNIA, INGUINAL, WITHOUT HISTORY OF PRIOR REPAIR, AGE 5 YEARS OR OLDER Right 10/10/2022     Procedure: REPAIR, HERNIA, INGUINAL, WITHOUT HISTORY OF PRIOR REPAIR, AGE 5 YEARS OR OLDER;  Surgeon: Dario Esquivel MD;  Location: [facility] OR [address] [place] [address];  Service: General;  Laterality: Right;  open right inguinal hernia repair with mesh     Family History   Problem Relation Age of Onset    Colon cancer Mother     Diabetes Mother     Heart disease Father     Mental illness Sister     Diabetes Sister     Other Brother         polio as a child    Pulmonary fibrosis Brother     Diabetes Brother     Myasthenia gravis Brother     Parkinsonism Brother     Diabetes Brother     Dementia Brother     Lung cancer Brother         smoker    Diabetes Maternal Aunt     Diabetes Other     Esophageal cancer Neg Hx      Social History     Tobacco Use    Smoking status: Former     Current packs/day: 0.00     Average packs/day: 0.3 packs/day for 5.0 years (1.3 ttl pk-yrs)     Types: Cigarettes     Start date: 10/2/1957     Quit date: 10/2/1962     Years since quittin.1    Smokeless tobacco: Former    Tobacco comments:     quit age 23   Substance Use Topics    Alcohol use: Not Currently    Drug use: No     Review of Systems    Physical Exam     Initial Vitals [23 0935]   BP Pulse Resp Temp SpO2   (!) 158/72 70 18 97.9 °F (36.6 °C) 100 %      MAP       --         Physical Exam    Nursing note and vitals reviewed.  Constitutional: He appears well-developed and well-nourished. He is not diaphoretic. No distress.   HENT:   Mouth/Throat: Oropharynx is clear and moist.   Eyes: Conjunctivae are normal.   Neck: Neck supple.   Cardiovascular:  Normal rate.           Pulmonary/Chest: No respiratory distress.   Musculoskeletal:         General: No edema.      Cervical back: Neck supple.     Neurological: He is alert.   Patient able to follow commands well and answer questions though he needs a lot of prompting in terms of orientation.  He knew that it was the  month but could not say or remember November, he knew he was in an  emergency department but could not remember the word [place].  He could not state the year.    He is got equal strength bilaterally, no dysarthria, no sensory loss   Skin: Skin is warm and dry. No pallor.   Psychiatric: His mood appears anxious. His speech is delayed. He expresses homicidal ideation.         ED Course   Procedures  Labs Reviewed   CBC W/ AUTO DIFFERENTIAL - Abnormal; Notable for the following components:       Result Value    Hemoglobin 13.1 (*)     MCHC 31.9 (*)     All other components within normal limits   URINALYSIS, REFLEX TO URINE CULTURE - Abnormal; Notable for the following components:    Specific Gravity, UA <1.005 (*)     All other components within normal limits    Narrative:     Specimen Source->Urine   ACETAMINOPHEN LEVEL - Abnormal; Notable for the following components:    Acetaminophen (Tylenol), Serum <3.0 (*)     All other components within normal limits   COMPREHENSIVE METABOLIC PANEL   TSH   DRUG SCREEN PANEL, URINE EMERGENCY    Narrative:     Specimen Source->Urine   ALCOHOL,MEDICAL (ETHANOL)          Imaging Results              CT Head Without Contrast (Final result)  Result time 11/24/23 11:05:18      Final result by Fabio David MD (11/24/23 11:05:18)                   Impression:      No evidence of acute intracranial pathology.    Electronically signed by resident: Dayton Tran  Date:    11/24/2023  Time:    10:19    Electronically signed by: Fabio David  Date:    11/24/2023  Time:    11:05               Narrative:    EXAMINATION:  CT HEAD WITHOUT CONTRAST    CLINICAL HISTORY:  Mental status change, unknown cause;    TECHNIQUE:  Low dose axial CT images obtained throughout the head without the use of intravenous contrast.  Axial, sagittal and coronal reconstructions were performed.    COMPARISON:  MRI 05/08/2022, CT 05/03/2022    FINDINGS:  INTRACRANIAL COMPARTMENT:    Prominence of the ventricles and sulci compatible with generalized cerebral volume loss.   Similar prominent extra-axial space overlying the left posterior frontal/parietal lobe at the vertex.  No hydrocephalus.    Mild patchy hypoattenuation in the supratentorial white matter, nonspecific but may reflect mild chronic microvascular ischemic changes.   No parenchymal mass effect, hemorrhage, edema or major vascular distribution infarct.    No extra-axial blood or fluid collections.    SKULL/EXTRACRANIAL CONTENTS (limited evaluation):    No fracture.  Mastoid air cells and paranasal sinuses are essentially clear.                                       Medications - No data to display  Medical Decision Making  This patient is experiencing an acute psychiatric emergency.  In addition, I am actually worried about some sort of underlying cognitive impairment and or dementia syndrome based on my discussions with the patient's wife.  Patient has had progressive dysphagia and while she initially says he is not had any issues with memory, it does seem that he is had issues with language they have been progressive over the last few months, he has issues with word finding intermittently and on my assessment today he has difficulty with naming even basic things like the name of the hospital, the month.  It would be very unusual to develop a psychiatric illness at his age and so I think that his psychiatric symptoms are likely connected to some sort of underlying dementia syndrome.      The ED plan will include the following interventions:    -Labs including CBC, CMP, TSH, UA, urine drug tox screen, ethanol for medical clearance  -CT head given age  -1:1 observation  -Medications will not be needed for agitation/patient and staff safety  -PEC placed because patient is not safe for d/c home either because of grave disability, acutely suicidal and homicidal  -Once medically cleared, patient will be transferred to outside facility for psychiatric admission        Amount and/or Complexity of Data Reviewed  External Data  Reviewed: notes.  Labs: ordered. Decision-making details documented in ED Course.  Radiology: ordered and independent interpretation performed. Decision-making details documented in ED Course.    Risk  Decision regarding hospitalization.                  Medically cleared for psychiatry placement: 11/24/2023 12:22 PM                Clinical Impression:  Final diagnoses:  [R45.850] Homicidal ideations (Primary)          ED Disposition Condition    Transfer to Psych Facility Stable          ED Prescriptions    None       Follow-up Information    None          Nanci Champion MD  11/24/23 1235

## 2023-11-24 NOTE — ED NOTES
Pt escorted off of the unit on a stretcher by ED and security staff. Pt's PEC, transfer form, and belongings given to [facility] staff. Pt's valuables gvien to his spouse. Pt remained calm and cooperative for the transfer.

## 2023-12-01 LAB
MYCOBACTERIUM SPEC CULT: ABNORMAL
SPECIMEN AND SOURCE, MYCOBACTERIUM: ABNORMAL

## 2023-12-04 ENCOUNTER — TELEPHONE (OUTPATIENT)
Dept: FAMILY MEDICINE | Facility: CLINIC | Age: 83
End: 2023-12-04
Payer: MEDICARE

## 2023-12-04 NOTE — TELEPHONE ENCOUNTER
----- Message from Jackie Jacques sent at 12/4/2023 10:11 AM CST -----  Regarding: Hospital Follow Up  Name of Who is Calling:  Blake calling from [facility]          What is the request in detail:  Patient need a 7 day follow up appointment, the earliest appointment I was able to make was 12/20/2023            Can the clinic reply by [facility]:             What Number to Call Back if not in [facility]: [phone] Ext [phone]

## 2023-12-06 ENCOUNTER — TELEPHONE (OUTPATIENT)
Dept: FAMILY MEDICINE | Facility: CLINIC | Age: 83
End: 2023-12-06
Payer: MEDICARE

## 2023-12-06 NOTE — TELEPHONE ENCOUNTER
Spoke to patient's wife in reference to her  having diarrhea. Wife refused Evisit, & Virtual Visit. Patient had a total of 6 loose bowel movements on today water in consistency. Patient was just discharged from [facility] on Monday 12/11/23 & started with the diarrhea on that morning. Patient afebrile, no nausea or vomiting, just can't stop going to the bathroom. He is taking lomotil, but not helping. Wife instructed to go to Urgent Care or Emergency Room for further medical assessment. Wife wants Provider to be aware of situation prior. Provider will be notified.

## 2023-12-06 NOTE — TELEPHONE ENCOUNTER
"Spoke with wife (Addis) to inform her to bring her  to [facility] for [facility] medical [facility]. Wife states "He has stopped". Wife instructed if diarrhea starts up again to go to the urgent care for further medical assessment. Wife stated understanding.  "

## 2023-12-06 NOTE — TELEPHONE ENCOUNTER
----- Message from Carmelina Schroeder sent at 12/6/2023  9:25 AM CST -----  Regarding: Addis  Who called: Addis wife        What is the request in detail: pt wife stated since pt hs been discharged from [facility], now pt experiencing diarrhea, pt has been taking Imodium and drinking electrolytes, pt wife would like nurse to call back        Can the clinic reply by [facility]? No        Would the patient rather a call back or a response via [facility]? Call back        Best call back number:[phone]

## 2023-12-06 NOTE — TELEPHONE ENCOUNTER
Noted/agree - needs evaluation.  Agree with urgent care if no appointments available in our office.

## 2023-12-07 ENCOUNTER — OFFICE VISIT (OUTPATIENT)
Dept: NEUROLOGY | Facility: CLINIC | Age: 83
End: 2023-12-07
Payer: MEDICARE

## 2023-12-07 ENCOUNTER — LAB VISIT (OUTPATIENT)
Dept: LAB | Facility: HOSPITAL | Age: 83
End: 2023-12-07
Payer: MEDICARE

## 2023-12-07 VITALS — DIASTOLIC BLOOD PRESSURE: 61 MMHG | HEART RATE: 50 BPM | SYSTOLIC BLOOD PRESSURE: 100 MMHG

## 2023-12-07 DIAGNOSIS — R41.89 MODERATE COGNITIVE IMPAIRMENT: ICD-10-CM

## 2023-12-07 DIAGNOSIS — R27.0 ATAXIA, UNSPECIFIED: ICD-10-CM

## 2023-12-07 DIAGNOSIS — R45.850 HOMICIDAL IDEATION: ICD-10-CM

## 2023-12-07 DIAGNOSIS — R26.0 ATAXIC GAIT: ICD-10-CM

## 2023-12-07 DIAGNOSIS — R41.3 OTHER AMNESIA: Primary | ICD-10-CM

## 2023-12-07 LAB
ACID FAST MOD KINY STN SPEC: ABNORMAL
CERULOPLASMIN SERPL-MCNC: 30 MG/DL (ref 15–45)
CRP SERPL-MCNC: 22.7 MG/L (ref 0–8.2)
ERYTHROCYTE [SEDIMENTATION RATE] IN BLOOD BY PHOTOMETRIC METHOD: 13 MM/HR (ref 0–23)
FOLATE SERPL-MCNC: 14.4 NG/ML (ref 4–24)
HCYS SERPL-SCNC: 8.3 UMOL/L (ref 4–16.5)
HIV 1+2 AB+HIV1 P24 AG SERPL QL IA: NORMAL
IGA SERPL-MCNC: 151 MG/DL (ref 40–350)
IGG SERPL-MCNC: 1011 MG/DL (ref 650–1600)
IGM SERPL-MCNC: 146 MG/DL (ref 50–300)
MYCOBACTERIUM SPEC QL CULT: ABNORMAL
RPR SER QL: NORMAL
VIT B12 SERPL-MCNC: 621 PG/ML (ref 210–950)

## 2023-12-07 PROCEDURE — 86038 ANTINUCLEAR ANTIBODIES: CPT

## 2023-12-07 PROCEDURE — 82607 VITAMIN B-12: CPT

## 2023-12-07 PROCEDURE — 82390 ASSAY OF CERULOPLASMIN: CPT

## 2023-12-07 PROCEDURE — 3288F PR FALLS RISK ASSESSMENT DOCUMENTED: ICD-10-PCS | Mod: CPTII,GC,S$GLB,

## 2023-12-07 PROCEDURE — 83921 ORGANIC ACID SINGLE QUANT: CPT

## 2023-12-07 PROCEDURE — 3074F PR MOST RECENT SYSTOLIC BLOOD PRESSURE < 130 MM HG: ICD-10-PCS | Mod: CPTII,GC,S$GLB,

## 2023-12-07 PROCEDURE — 3074F SYST BP LT 130 MM HG: CPT | Mod: CPTII,GC,S$GLB,

## 2023-12-07 PROCEDURE — 1157F PR ADVANCE CARE PLAN OR EQUIV PRESENT IN MEDICAL RECORD: ICD-10-PCS | Mod: CPTII,GC,S$GLB,

## 2023-12-07 PROCEDURE — 84425 ASSAY OF VITAMIN B-1: CPT

## 2023-12-07 PROCEDURE — 3288F FALL RISK ASSESSMENT DOCD: CPT | Mod: CPTII,GC,S$GLB,

## 2023-12-07 PROCEDURE — 82746 ASSAY OF FOLIC ACID SERUM: CPT

## 2023-12-07 PROCEDURE — 82784 ASSAY IGA/IGD/IGG/IGM EACH: CPT | Mod: 59

## 2023-12-07 PROCEDURE — 83090 ASSAY OF HOMOCYSTEINE: CPT

## 2023-12-07 PROCEDURE — 86592 SYPHILIS TEST NON-TREP QUAL: CPT

## 2023-12-07 PROCEDURE — 1101F PR PT FALLS ASSESS DOC 0-1 FALLS W/OUT INJ PAST YR: ICD-10-PCS | Mod: CPTII,GC,S$GLB,

## 2023-12-07 PROCEDURE — 87389 HIV-1 AG W/HIV-1&-2 AB AG IA: CPT

## 2023-12-07 PROCEDURE — 1126F PR PAIN SEVERITY QUANTIFIED, NO PAIN PRESENT: ICD-10-PCS | Mod: CPTII,GC,S$GLB,

## 2023-12-07 PROCEDURE — 83520 IMMUNOASSAY QUANT NOS NONAB: CPT

## 2023-12-07 PROCEDURE — 82300 ASSAY OF CADMIUM: CPT

## 2023-12-07 PROCEDURE — 0361U NEURFLMNT LT CHN DIG IA QUAN: CPT

## 2023-12-07 PROCEDURE — 0346U AD DETECT, BETA-AMYLOID 42/40 RATIO: CPT

## 2023-12-07 PROCEDURE — 3078F DIAST BP <80 MM HG: CPT | Mod: CPTII,GC,S$GLB,

## 2023-12-07 PROCEDURE — 86140 C-REACTIVE PROTEIN: CPT

## 2023-12-07 PROCEDURE — 99214 PR OFFICE/OUTPT VISIT, EST, LEVL IV, 30-39 MIN: ICD-10-PCS | Mod: GC,S$GLB,,

## 2023-12-07 PROCEDURE — 86255 FLUORESCENT ANTIBODY SCREEN: CPT | Mod: 59

## 2023-12-07 PROCEDURE — 1157F ADVNC CARE PLAN IN RCRD: CPT | Mod: CPTII,GC,S$GLB,

## 2023-12-07 PROCEDURE — 85652 RBC SED RATE AUTOMATED: CPT

## 2023-12-07 PROCEDURE — 82525 ASSAY OF COPPER: CPT

## 2023-12-07 PROCEDURE — 99999 PR PBB SHADOW E&M-EST. PATIENT-LVL III: ICD-10-PCS | Mod: PBBFAC,GC,,

## 2023-12-07 PROCEDURE — 1126F AMNT PAIN NOTED NONE PRSNT: CPT | Mod: CPTII,GC,S$GLB,

## 2023-12-07 PROCEDURE — 36415 COLL VENOUS BLD VENIPUNCTURE: CPT

## 2023-12-07 PROCEDURE — 99999 PR PBB SHADOW E&M-EST. PATIENT-LVL III: CPT | Mod: PBBFAC,GC,,

## 2023-12-07 PROCEDURE — 3078F PR MOST RECENT DIASTOLIC BLOOD PRESSURE < 80 MM HG: ICD-10-PCS | Mod: CPTII,GC,S$GLB,

## 2023-12-07 PROCEDURE — 99214 OFFICE O/P EST MOD 30 MIN: CPT | Mod: GC,S$GLB,,

## 2023-12-07 PROCEDURE — 1101F PT FALLS ASSESS-DOCD LE1/YR: CPT | Mod: CPTII,GC,S$GLB,

## 2023-12-07 NOTE — PROGRESS NOTES
[facility] - NEUROLOGY [facility]TH [place] [facility], [place]    Date: 12/7/23  Patient Name: Diego Gunter   MRN: [id number]   PCP: Portia Ferreira  Referring Provider: No ref. provider found    Assessment:   Diego Gunter is a 83 y.o. male here for follow up on swallowing difficulties. But he reports he no longer experiences these issues. On the other hand, he recently had an ED visit after an episode where he had thoughts about killing his wife. He was at [facility] for 10 days, where he was started on several medications that the family can't confirm.    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 He then contracted COVID in 1/2021. Following discharge, he had PE. After that, he had compressive epidural hematoma in the cervical for which he had decompression of epidural hematoma and posterior fusion.    But over these couple of years, he is found to have declined cognitively. His social interactions have decreased. Family reports that while at [facility], he was a bit more conversant, but overall, his cognitive decline has been stead but they deny that he has progressively worsened.    He is currently on escitalopram, donepezil, B complex, atorvastatin, Aspirin, Amkidacin.    Plan:   -- Neurofilament light chain, pTau-181, Dementia/Autoimmune/paraneoplastic panel, AD Detect Beta amyloid ratio  -- Reversible causes of dementia, Folate, MMA, Immunoglobulins, RPR, ZAKI, CRP, ESR, HIV, B1, B12, Homocysteine, Heavy metal, Copper/Ceruloplasmin  -- Referral to neuropsychiatric evaluation  -- Referral to psychiatry  -- MRI brain without contrast with thin cuts  -- Will consider referral to [facility] with Dr. Juan Pineda at a later date  -- Deferring EMG for evaluation of cervical radiculopathy  -- RTC January 25, 2024    Problem List Items Addressed This Visit    None  Visit Diagnoses       Other amnesia    -  Primary     "Relevant Orders    MRI Brain Without Contrast    Moderate cognitive impairment        Relevant Orders    Neurofilament Light Chain    MRI Brain Without Contrast    FOLATE    METHYLMALONIC ACID, SERUM    IMMUNOGLOBULINS (IGG, IGA, IGM) QUANTITATIVE    RPR    pTau-181, Plasma    ZAKI    C-REACTIVE PROTEIN    Sedimentation rate    HIV 1/2 Ag/Ab (4th Gen)    Dementia, Autoimmune/Paraneoplastic Eval, S    AD Detect, Beta-Amyloid 42/40 Ratio    Ambulatory referral/consult to Psychiatry    Ceruloplasmin    Copper, serum    Heavy Metals Screen, Blood (Quantitative)    Homocysteine, serum    Vitamin B12    VITAMIN B1    Ataxic gait        Relevant Orders    FOLATE    Homicidal ideation        Relevant Orders    Ambulatory referral/consult to Psychiatry    Ataxia, unspecified        Relevant Orders    Vitamin B12              12/07/2023  Quinn Arriola [place], [facility] [place], PGY-3  Department of Neurology  [facility]    Subjective:     Patient seen in consultation at the request of No ref. provider found for the evaluation of dysphagia.         12/07/2023 Interval: patient now reports he no longer has difficulty with swallowing. He recalls previously having difficulties.    HPI:   Mr. Diego Gunter is a 83 y.o. male presenting with  dysphagia since [id number]. It did improve after therapy. He had a C3-6 PCF cervical epidural hematoma surgery 4/2022 with Dr. Miller, followed by a month of rehab. However, for the past 6 months or longer, he has noticed his swallowing difficulties returning. PMHx includes prostate cancer (2011), centrilobular emphysema, pulmonary Mycobacterium avium intracellulare infection, hearing impairment, and GERD. Swallowing difficulties with solids and per note from speech, also liquids: "aspiration that were spontaneously cleared by throat clearing and delayed cough" ; prognosis guarded. Reportedly, this is due to an oropharyngeal dysfunction. FL Esophagram, showing weakened " "pharyngeal constriction and mild elicited gastroesophageal reflux: "Weakened pharyngeal constriction/residue.  Instance of flash laryngeal penetration."    Single breath count 18, confounded by baseline lung issues. His wife reports that his volume can sometimes go down as he continues to talk. There is family history of myasthenia gravis in his brother. There was concern about myasthenia. However, patient has also had prior epidural hematoma in the cervical region which may be responsible for his symptoms, though there are doubts as to whether the injury extended to an area that would localize.    Musk antibody and Myasthenia gravis eval (AchR antibody) were both negative.    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 Dr. Pham    Hx of colonic polyps     followed by GI. Dr. Pham    Hyperlipidemia associated with type 2 diabetes mellitus     Hyperlipidemia LDL goal <100     Hypernatremia 05/05/2022    Hypertension associated with type 2 diabetes mellitus     Hypertension associated with type 2 diabetes mellitus     Hypotension     Intermittent confusion 04/29/2022    Overweight(278.02)     Prostate cancer 09/2011    followed by urology, Dr. Rogers    Pulmonary embolism     Reducible right inguinal hernia 10/10/2022    Right hemiparesis 07/22/2022    Secondary to cervical hematoma    Type II or unspecified type diabetes mellitus without mention of complication, not stated as uncontrolled     diet controlled    Urinary retention 04/29/2022       PAST " SURGICAL HISTORY:  Past Surgical History:   Procedure Laterality Date    BRONCHOSCOPY N/A 10/15/2018    Procedure: BRONCHOSCOPY;  Surgeon: [facility];  [facility];  Service: Anesthesiology;  Laterality: N/A;    CARPAL TUNNEL RELEASE Right 1/11/2023    Procedure: RELEASE, CARPAL TUNNEL;  Surgeon: Romero Lazo MD;  Location: [facility] OR;  Service: Orthopedics;  Laterality: Right;    CATARACT EXTRACTION, BILATERAL  2014    COLONOSCOPY N/A 5/16/2023    Procedure: COLONOSCOPY;  Surgeon: Buster Sequeira MD;  Location: [facility] (4TH FLR);  Service: Endoscopy;  Laterality: N/A;    DECOMPRESSION, NERVE, ULNAR Right 1/11/2023    Procedure: DECOMPRESSION, NERVE, ULNAR - right cub jolie and guyon's decompression as well as open right carpal tunnel release;  Surgeon: Romero Lazo MD;  Location: [facility] OR;  Service: Orthopedics;  Laterality: Right;    ESOPHAGOGASTRODUODENOSCOPY N/A 5/16/2023    Procedure: EGD (ESOPHAGOGASTRODUODENOSCOPY);  Surgeon: Buster Sequeira MD;  Location: [facility] (4TH FLR);  Service: Endoscopy;  Laterality: N/A;  inst mailed-RB  5/10/23 no answer for precall/mleone lpn    POSTERIOR CERVICAL LAMINECTOMY Bilateral 4/28/2022    Procedure: LAMINECTOMY, SPINE, CERVICAL, POSTERIOR APPROACH C3-6;  Surgeon: Carlos Miller MD;  Location: [facility] OR [address] [place] [address];  Service: Neurosurgery;  Laterality: Bilateral;    PROSTATECTOMY      REPAIR, HERNIA, INGUINAL, WITHOUT HISTORY OF PRIOR REPAIR, AGE 5 YEARS OR OLDER Right 10/10/2022    Procedure: REPAIR, HERNIA, INGUINAL, WITHOUT HISTORY OF PRIOR REPAIR, AGE 5 YEARS OR OLDER;  Surgeon: Dario Esquivel MD;  Location: [facility] OR [address] [place] [address];  Service: General;  Laterality: Right;  open right inguinal hernia repair with mesh       CURRENT MEDS:  Current Outpatient Medications   Medication Sig Dispense Refill    albuterol (VENTOLIN HFA) 90 mcg/actuation inhaler Inhale 2 puffs into the lungs daily as needed (30 min before arikayce). Rescue (Patient not  taking: Reported on 2023) 18 g 11    ARIKAYCE 590 mg/8.4 mL NbSp       aspirin (ECOTRIN) 81 MG EC tablet Take 81 mg by mouth once daily.      azelastine (ASTELIN) 137 mcg (0.1 %) nasal spray 2 sprays (274 mcg total) by Nasal route 2 (two) times daily. 30 mL 0    b complex vitamins capsule Take 1 capsule by mouth once daily.      nebulizer and compressor (CanoP COMPRESSOR SYSTEM) Marcy use to administer nebulized medication as directed 1 each 0    pantoprazole (PROTONIX) 40 MG tablet Take 1 tablet (40 mg total) by mouth once daily. Take 30 minutes before breakfast 30 tablet 2    pravastatin (PRAVACHOL) 10 MG tablet Take 1 tablet (10 mg total) by mouth every evening. 90 tablet 1    sodium chloride 3% 3 % nebulizer solution USE 4 ML VIAL IN NEBULIZER  TWICE DAILY 480 mL 11    sodium chloride 7% 7 % nebulizer solution use one vial in nebulizer twice a day 500 mL 11     No current facility-administered medications for this visit.       ALLERGIES:  Review of patient's allergies indicates:  No Known Allergies    FAMILY HISTORY:  Family History   Problem Relation Age of Onset    Colon cancer Mother     Diabetes Mother     Heart disease Father     Mental illness Sister     Diabetes Sister     Other Brother         polio as a child    Pulmonary fibrosis Brother     Diabetes Brother     Myasthenia gravis Brother     Parkinsonism Brother     Diabetes Brother     Dementia Brother     Lung cancer Brother         smoker    Diabetes Maternal Aunt     Diabetes Other     Esophageal cancer Neg Hx        SOCIAL HISTORY:  Social History     Tobacco Use    Smoking status: Former     Current packs/day: 0.00     Average packs/day: 0.3 packs/day for 5.0 years (1.3 ttl pk-yrs)     Types: Cigarettes     Start date: 10/2/1957     Quit date: 10/2/1962     Years since quittin.2    Smokeless tobacco: Former    Tobacco comments:     quit age 23   Substance Use Topics    Alcohol use: Not Currently    Drug use: No       Review of  Systems:  12 system review of systems is negative except for the symptoms mentioned in HPI.        Objective:     Vitals:    12/07/23 0800   BP: 100/61   Pulse: (!) 50     General: NAD, well nourished   Eyes: no tearing, discharge, no erythema   ENT: moist mucous membranes of the oral cavity, nares patent    Neck: Supple, full range of motion  Cardiovascular: Warm and well perfused, pulses equal and symmetrical  Lungs: Normal work of breathing, normal chest wall excursions  Skin: No rash, lesions, or breakdown on exposed skin  Psychiatry: Mood and affect are appropriate   Abdomen: soft, non tender, non distended  Extremeties: No cyanosis, clubbing or edema.        Neurological Exam:    MENTAL STATUS  Level of consciousness: alert  Orientation: oriented to person, place, and but not year  Attention normal. Concentration normal.  Speech: abnormalities noted in vocalization, reduced volume and clarity of speech; confounded by patient's reporting that he sometimes has difficulty with expressive aphasia    CRANIAL NERVES  CN II: Visual fields full to confrontation  CN III, IV, VI: PERRL, EOMI  CN V: Facial sensation intact  CN VII: Facial expression symmetric and full  CN VIII: Hearing intact to finger rub  CN IX, X: Symmetric palate elevation. Phonation normal  CN XI: Shoulder shrug (limited by pain on the right) and head turn intact bilaterally normal  CN XII: Tongue midline with normal movements, no atrophy    MOTOR EXAM  Muscle bulk: normal  Muscle tone: normal  Pronator drift: absent    Strength - Upper Extremities   Arm abduction Elbow flexion Elbow extension Wrist flexion Wrist extension Finger abduction   Right 5/5 5/5 5/5 5/5 5/5 5/5   Left 5/5 5/5 5/5 5/5 5/5 5/5     Strength - Lower Extremities   Hip flexion Knee flexion Knee extension Dorsiflexion Plantarflexion   Right 5/5 5/5 5/5 5/5 5/5   Left 5/5 5/5 5/5 5/5 5/5       REFLEXES   Biceps Triceps Brachioradialis Patellar Achilles   Right +2 +2 +2 +2 +2    Left +2 +2 +2 +2 +2     Toes down bilaterally    SENSORY EXAM  Light touch: intact in all 4 extremities    COORDINATION  Finger to nose: normal  Gait  is slowed, slightly apraxic, reduced arm swing, base, and turning  Romberg negative

## 2023-12-08 LAB
ANA SER QL IF: NORMAL
ARSENIC BLD-MCNC: <1 NG/ML
CADMIUM BLD-MCNC: 0.3 NG/ML
CITY: NORMAL
COUNTY: NORMAL
GUARDIAN FIRST NAME: NORMAL
GUARDIAN LAST NAME: NORMAL
HOME PHONE: NORMAL
LEAD BLD-MCNC: 1.3 MCG/DL
MERCURY BLD-MCNC: <1 NG/ML
RACE: NORMAL
STATE: NORMAL
STREET ADDRESS: NORMAL
VENOUS/CAPILLARY: NORMAL
ZIP: NORMAL

## 2023-12-11 LAB
METHYLMALONATE SERPL-SCNC: 0.2 UMOL/L
NEUROFILAMENT LIGHT CHAIN, PLASMA: 26.3 PG/ML
PHOSPHO-TAU (181P): 2.21 PG/ML (ref 0–0.97)

## 2023-12-12 ENCOUNTER — TELEPHONE (OUTPATIENT)
Dept: NEUROLOGY | Facility: CLINIC | Age: 83
End: 2023-12-12
Payer: MEDICARE

## 2023-12-12 LAB
COPPER SERPL-MCNC: 989 UG/L (ref 665–1480)
VIT B1 BLD-MCNC: 67 UG/L (ref 38–122)

## 2023-12-12 NOTE — TELEPHONE ENCOUNTER
----- Message from Josey Pringle sent at 12/12/2023  9:35 AM CST -----  Regarding: patient call back  Type: Patient Call Back    Who called: Addis- wife     What is the request in detail: Asked for a call back because she lost the paper that had the list of phycologists  and would like to get it sent to her.     Can the clinic reply by [facility]? No     Would the patient rather a call back or a response via [facility]? Call     Best call back number: .[phone]

## 2023-12-13 LAB
ABETA 42/40 RATIO: 0.14
AL BETA40 PLAS-MCNC: 275 PG/ML
AL BETA42 PLAS-MCNC: 38 PG/ML

## 2023-12-14 LAB
AMPA-R AB CBA, SERUM: NEGATIVE
AMPHIPHYSIN AB TITR SER: NEGATIVE {TITER}
ANNOTATION COMMENT IMP: NORMAL
CASPR2-IGG CBA: NEGATIVE
CV2 IGG TITR SER: NEGATIVE {TITER}
DPPX IGG SERPL QL IF: NEGATIVE
GABA-B-R AB CBA, SERUM: NEGATIVE
GAD65 AB SER-SCNC: 0 NMOL/L
GFAP ALPHA IGG SER QL IF: NEGATIVE
GLIAL NUC TYPE 1 AB TITR SER: NEGATIVE {TITER}
HU1 AB TITR SER: NEGATIVE {TITER}
HU2 AB TITR SER IF: NEGATIVE {TITER}
HU3 AB TITR SER: NEGATIVE {TITER}
IGLON5 IFA, S: NEGATIVE
IMMUNOLOGIST REVIEW: NORMAL
LGI1-IGG CBA: NEGATIVE
M TB DNA SPEC QL NAA+PROBE: ABNORMAL
MGLUR1 AB IFA, SERUM: NEGATIVE
NEUROCHONDRIN, IFA, S: NEGATIVE
NIF IFA, S: NEGATIVE
NMDA-R AB CBA, SERUM: NEGATIVE
PCA-2 AB TITR SER: NEGATIVE {TITER}
PCA-TR AB TITR SER: NEGATIVE {TITER}

## 2023-12-16 ENCOUNTER — HOSPITAL ENCOUNTER (OUTPATIENT)
Dept: RADIOLOGY | Facility: HOSPITAL | Age: 83
Discharge: HOME | End: 2023-12-16
Payer: MEDICARE

## 2023-12-16 DIAGNOSIS — R41.89 MODERATE COGNITIVE IMPAIRMENT: ICD-10-CM

## 2023-12-16 DIAGNOSIS — R41.3 OTHER AMNESIA: ICD-10-CM

## 2023-12-16 PROCEDURE — 70551 MRI BRAIN STEM W/O DYE: CPT | Mod: TC

## 2023-12-16 PROCEDURE — 70551 MRI BRAIN STEM W/O DYE: CPT | Mod: 26,,, | Performed by: RADIOLOGY

## 2023-12-16 PROCEDURE — 70551 MRI BRAIN WITHOUT CONTRAST: ICD-10-PCS | Mod: 26,,, | Performed by: RADIOLOGY

## 2023-12-18 ENCOUNTER — PATIENT MESSAGE (OUTPATIENT)
Dept: INFECTIOUS DISEASES | Facility: CLINIC | Age: 83
End: 2023-12-18
Payer: MEDICARE

## 2023-12-19 ENCOUNTER — TELEPHONE (OUTPATIENT)
Dept: FAMILY MEDICINE | Facility: CLINIC | Age: 83
End: 2023-12-19
Payer: MEDICARE

## 2023-12-19 NOTE — TELEPHONE ENCOUNTER
----- Message from Elvira Parsons sent at 12/19/2023 10:56 AM CST -----  Regarding: Addis/ Spouse/  [phone]  Type: Patient Call Back    Who called:  Spouse    What is the request in detail:  Patients spouse is needing a call back from staff regarding patiens medication that was called into the pharmacy.  Thank you    Would the patient rather a call back or a response via [facility]?   Call back    Best call back number:  [phone]        Thank you

## 2023-12-19 NOTE — TELEPHONE ENCOUNTER
Spoke to wife (Addis) in reference to her  having his Lexapro being refilled that was originally ordered by a MD at [facility]. Informed wife that patient has an appointment with his PCP on 12/2023 & that request can be discussed then. Medication is not current on patient's medication list.

## 2023-12-20 ENCOUNTER — OFFICE VISIT (OUTPATIENT)
Dept: FAMILY MEDICINE | Facility: CLINIC | Age: 83
End: 2023-12-20
Payer: MEDICARE

## 2023-12-20 VITALS
HEIGHT: 70 IN | SYSTOLIC BLOOD PRESSURE: 96 MMHG | BODY MASS INDEX: 23.21 KG/M2 | OXYGEN SATURATION: 97 % | DIASTOLIC BLOOD PRESSURE: 58 MMHG | TEMPERATURE: 98 F | HEART RATE: 50 BPM | WEIGHT: 162.13 LBS

## 2023-12-20 DIAGNOSIS — A31.0 MAI (MYCOBACTERIUM AVIUM-INTRACELLULARE): ICD-10-CM

## 2023-12-20 DIAGNOSIS — F41.9 ANXIETY: ICD-10-CM

## 2023-12-20 DIAGNOSIS — G31.84 MILD COGNITIVE IMPAIRMENT: ICD-10-CM

## 2023-12-20 DIAGNOSIS — Z09 HOSPITAL DISCHARGE FOLLOW-UP: Primary | ICD-10-CM

## 2023-12-20 PROCEDURE — 3078F DIAST BP <80 MM HG: CPT | Mod: CPTII,S$GLB,, | Performed by: INTERNAL MEDICINE

## 2023-12-20 PROCEDURE — 1160F PR REVIEW ALL MEDS BY PRESCRIBER/CLIN PHARMACIST DOCUMENTED: ICD-10-PCS | Mod: CPTII,S$GLB,, | Performed by: INTERNAL MEDICINE

## 2023-12-20 PROCEDURE — 3288F FALL RISK ASSESSMENT DOCD: CPT | Mod: CPTII,S$GLB,, | Performed by: INTERNAL MEDICINE

## 2023-12-20 PROCEDURE — 1159F MED LIST DOCD IN RCRD: CPT | Mod: CPTII,S$GLB,, | Performed by: INTERNAL MEDICINE

## 2023-12-20 PROCEDURE — 99214 OFFICE O/P EST MOD 30 MIN: CPT | Mod: S$GLB,,, | Performed by: INTERNAL MEDICINE

## 2023-12-20 PROCEDURE — 3288F PR FALLS RISK ASSESSMENT DOCUMENTED: ICD-10-PCS | Mod: CPTII,S$GLB,, | Performed by: INTERNAL MEDICINE

## 2023-12-20 PROCEDURE — 1101F PT FALLS ASSESS-DOCD LE1/YR: CPT | Mod: CPTII,S$GLB,, | Performed by: INTERNAL MEDICINE

## 2023-12-20 PROCEDURE — 99214 PR OFFICE/OUTPT VISIT, EST, LEVL IV, 30-39 MIN: ICD-10-PCS | Mod: S$GLB,,, | Performed by: INTERNAL MEDICINE

## 2023-12-20 PROCEDURE — 1126F AMNT PAIN NOTED NONE PRSNT: CPT | Mod: CPTII,S$GLB,, | Performed by: INTERNAL MEDICINE

## 2023-12-20 PROCEDURE — 1157F ADVNC CARE PLAN IN RCRD: CPT | Mod: CPTII,S$GLB,, | Performed by: INTERNAL MEDICINE

## 2023-12-20 PROCEDURE — 1101F PR PT FALLS ASSESS DOC 0-1 FALLS W/OUT INJ PAST YR: ICD-10-PCS | Mod: CPTII,S$GLB,, | Performed by: INTERNAL MEDICINE

## 2023-12-20 PROCEDURE — 1157F PR ADVANCE CARE PLAN OR EQUIV PRESENT IN MEDICAL RECORD: ICD-10-PCS | Mod: CPTII,S$GLB,, | Performed by: INTERNAL MEDICINE

## 2023-12-20 PROCEDURE — 1159F PR MEDICATION LIST DOCUMENTED IN MEDICAL RECORD: ICD-10-PCS | Mod: CPTII,S$GLB,, | Performed by: INTERNAL MEDICINE

## 2023-12-20 PROCEDURE — 1160F RVW MEDS BY RX/DR IN RCRD: CPT | Mod: CPTII,S$GLB,, | Performed by: INTERNAL MEDICINE

## 2023-12-20 PROCEDURE — 99999 PR PBB SHADOW E&M-EST. PATIENT-LVL IV: ICD-10-PCS | Mod: PBBFAC,,, | Performed by: INTERNAL MEDICINE

## 2023-12-20 PROCEDURE — 3074F PR MOST RECENT SYSTOLIC BLOOD PRESSURE < 130 MM HG: ICD-10-PCS | Mod: CPTII,S$GLB,, | Performed by: INTERNAL MEDICINE

## 2023-12-20 PROCEDURE — 99999 PR PBB SHADOW E&M-EST. PATIENT-LVL IV: CPT | Mod: PBBFAC,,, | Performed by: INTERNAL MEDICINE

## 2023-12-20 PROCEDURE — 3074F SYST BP LT 130 MM HG: CPT | Mod: CPTII,S$GLB,, | Performed by: INTERNAL MEDICINE

## 2023-12-20 PROCEDURE — 3078F PR MOST RECENT DIASTOLIC BLOOD PRESSURE < 80 MM HG: ICD-10-PCS | Mod: CPTII,S$GLB,, | Performed by: INTERNAL MEDICINE

## 2023-12-20 PROCEDURE — 1126F PR PAIN SEVERITY QUANTIFIED, NO PAIN PRESENT: ICD-10-PCS | Mod: CPTII,S$GLB,, | Performed by: INTERNAL MEDICINE

## 2023-12-20 RX ORDER — ESCITALOPRAM OXALATE 10 MG/1
15 TABLET ORAL
COMMUNITY
Start: 2023-12-04 | End: 2023-12-20 | Stop reason: SDUPTHER

## 2023-12-20 RX ORDER — DONEPEZIL HYDROCHLORIDE 5 MG/1
5 TABLET, FILM COATED ORAL NIGHTLY
Qty: 90 TABLET | Refills: 0 | Status: SHIPPED | OUTPATIENT
Start: 2023-12-20 | End: 2024-01-25

## 2023-12-20 RX ORDER — ESCITALOPRAM OXALATE 10 MG/1
15 TABLET ORAL DAILY
Qty: 145 TABLET | Refills: 0 | Status: SHIPPED | OUTPATIENT
Start: 2023-12-20 | End: 2024-03-17 | Stop reason: SDUPTHER

## 2023-12-20 RX ORDER — DONEPEZIL HYDROCHLORIDE 5 MG/1
5 TABLET, FILM COATED ORAL NIGHTLY
COMMUNITY
Start: 2023-12-04 | End: 2023-12-20 | Stop reason: SDUPTHER

## 2023-12-20 NOTE — PROGRESS NOTES
CHIEF COMPLAINT:   Chief Complaint   Patient presents with    Hospital Follow Up     Behavioral health           HISTORY OF PRESENT ILLNESS:  Diego Gunter is a 83 y.o. male who presents to the clinic today for Hospital Follow Up (Behavioral health )  .      The patient presents to clinic today with his wife for follow-up of a recent hospitalization for anxiety.  He states he woke up 1 morning with a voice in his head that was telling him to kill his wife.  States he went into the kitchen and try to watch a mass on television but the voice would not stop.  His wife eventually took him to the hospital where he was medically cleared but then sent to [facility] for 7 days for further evaluation.  They felt that he was too ill to be diagnosed with any type of new psychiatric illness.  This may be related to his overall cognitive decline.  He has since seen Neurology.  His wife reports that they were given a list of providers to follow-up with, but she lost the list.  She herself is also having some anxiety at this time in his wanted to see a counselor.  [facility] had offered some counseling but it was very intense and he would have to sign a contract and go in-person several days per week to their facility which was too much for him.  Apparently they started him on Lexapro and Aricept.  His wife reports that the day after they started him on Aricept became much more talkative than he has ever been in his life.  He reports today that he is no longer hearing any voices in his head.    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 Dr. Pham    Hx of colonic polyps     followed by GI. Dr. Pham    Hyperlipidemia associated with type 2 diabetes mellitus     Hyperlipidemia LDL goal <100     Hypernatremia 05/05/2022    Hypertension associated with type 2 diabetes mellitus     Hypertension associated with type 2 diabetes mellitus     Hypotension     Intermittent confusion 04/29/2022    Overweight(278.02)     Prostate cancer 09/2011    followed by urology, Dr. Rogers    Pulmonary embolism     Reducible right inguinal hernia 10/10/2022    Right hemiparesis 07/22/2022    Secondary to cervical hematoma    Type II or unspecified type diabetes mellitus without mention of complication, not stated as uncontrolled     diet controlled    Urinary retention 04/29/2022       PAST SURGICAL HISTORY:  Past Surgical History:   Procedure Laterality Date    BRONCHOSCOPY N/A 10/15/2018    Procedure: BRONCHOSCOPY;  Surgeon: Pratibha Diagnostic Provider;  Location: [facility] OR 2ND FLR;  Service: Anesthesiology;  Laterality: N/A;    CARPAL TUNNEL RELEASE Right 1/11/2023    Procedure: RELEASE, CARPAL TUNNEL;  Surgeon: Romero Lazo MD;  Location: [facility] OR;  Service: Orthopedics;  Laterality: Right;    CATARACT EXTRACTION, BILATERAL  2014    COLONOSCOPY N/A 5/16/2023    Procedure: COLONOSCOPY;  Surgeon: Buster Sequeira MD;  Location: [facility] (4TH FLR);  Service: Endoscopy;  Laterality: N/A;    DECOMPRESSION, NERVE, ULNAR Right 1/11/2023    Procedure: DECOMPRESSION, NERVE, ULNAR - right cub jolie and guyon's decompression as well as open right carpal tunnel release;  Surgeon: Romero Lazo MD;  Location: [facility] OR;  Service: Orthopedics;  Laterality: Right;    ESOPHAGOGASTRODUODENOSCOPY N/A 5/16/2023    Procedure: EGD (ESOPHAGOGASTRODUODENOSCOPY);  Surgeon: Buster Sequeira MD;  Location: [facility] ENDO (4TH FLR);  Service: Endoscopy;  Laterality: N/A;  inst mailed-RB  5/10/23 no answer for  precall/mleone lpn    POSTERIOR CERVICAL LAMINECTOMY Bilateral 2022    Procedure: LAMINECTOMY, SPINE, CERVICAL, POSTERIOR APPROACH C3-6;  Surgeon: Carlos Miller MD;  Location: [address];  Service: Neurosurgery;  Laterality: Bilateral;    PROSTATECTOMY      REPAIR, HERNIA, INGUINAL, WITHOUT HISTORY OF PRIOR REPAIR, AGE 5 YEARS OR OLDER Right 10/10/2022    Procedure: REPAIR, HERNIA, INGUINAL, WITHOUT HISTORY OF PRIOR REPAIR, AGE 5 YEARS OR OLDER;  Surgeon: Dario Esquivel MD;  Location: [address];  Service: General;  Laterality: Right;  open right inguinal hernia repair with mesh       SOCIAL HISTORY:  Social History     Socioeconomic History    Marital status:      Spouse name: Addis    Number of children: 2   Occupational History    Occupation: [employer]    Tobacco Use    Smoking status: Former     Current packs/day: 0.00     Average packs/day: 0.3 packs/day for 5.0 years (1.3 ttl pk-yrs)     Types: Cigarettes     Start date: 10/2/1957     Quit date: 10/2/1962     Years since quittin.2    Smokeless tobacco: Former    Tobacco comments:     quit age 23   Substance and Sexual Activity    Alcohol use: Not Currently    Drug use: No    Sexual activity: Yes     Partners: Female     Social Determinants of Health     Financial Resource Strain: Low Risk  (2023)    Overall Financial Resource Strain (CARDIA)     Difficulty of Paying Living Expenses: Not hard at all   Food Insecurity: No Food Insecurity (2023)    Hunger Vital Sign     Worried About Running Out of Food in the Last Year: Never true     Ran Out of Food in the Last Year: Never true   Transportation Needs: No Transportation Needs (2023)    PRAPARE - Transportation     Lack of Transportation (Medical): No     Lack of Transportation (Non-Medical): No   Physical Activity: Insufficiently Active (2023)    Exercise Vital Sign     Days of Exercise per Week: 1 day     Minutes of Exercise per Session: 10 min    Stress: No Stress Concern Present (12/4/2023)    [employer] [place] [employer] - Occupational Stress Questionnaire     Feeling of Stress : Not at all   Social Connections: Moderately Isolated (12/4/2023)    Social Connection and Isolation Panel [NHANES]     Frequency of Communication with Friends and Family: Once a week     Frequency of Social Gatherings with Friends and Family: Once a week     Attends Mosque Services: More than 4 times per year     Active Member of Clubs or Organizations: No     Attends Club or Organization Meetings: Never     Marital Status:    Housing Stability: Low Risk  (12/4/2023)    Housing Stability Vital Sign     Unable to Pay for Housing in the Last Year: No     Number of Places Lived in the Last Year: 1     Unstable Housing in the Last Year: No       FAMILY HISTORY:  Family History   Problem Relation Age of Onset    Colon cancer Mother     Diabetes Mother     Heart disease Father     Mental illness Sister     Diabetes Sister     Other Brother         polio as a child    Pulmonary fibrosis Brother     Diabetes Brother     Myasthenia gravis Brother     Parkinsonism Brother     Diabetes Brother     Dementia Brother     Lung cancer Brother         smoker    Diabetes Maternal Aunt     Diabetes Other     Esophageal cancer Neg Hx        ALLERGIES AND MEDICATIONS: updated and reviewed.  Review of patient's allergies indicates:  No Known Allergies  Medication List with Changes/Refills   Current Medications    ALBUTEROL (VENTOLIN HFA) 90 MCG/ACTUATION INHALER    Inhale 2 puffs into the lungs daily as needed (30 min before arikayce). Rescue    ARIKAYCE 590 MG/8.4 ML NBSP        ASPIRIN (ECOTRIN) 81 MG EC TABLET    Take 81 mg by mouth once daily.    AZELASTINE (ASTELIN) 137 MCG (0.1 %) NASAL SPRAY    2 sprays (274 mcg total) by Nasal route 2 (two) times daily.    B COMPLEX VITAMINS CAPSULE    Take 1 capsule by mouth once daily.    NEBULIZER AND COMPRESSOR (OMBRA COMPRESSOR  SYSTEM) DANIEL    use to administer nebulized medication as directed    PANTOPRAZOLE (PROTONIX) 40 MG TABLET    Take 1 tablet (40 mg total) by mouth once daily. Take 30 minutes before breakfast    PRAVASTATIN (PRAVACHOL) 10 MG TABLET    Take 1 tablet (10 mg total) by mouth every evening.    SODIUM CHLORIDE 3% 3 % NEBULIZER SOLUTION    USE 4 ML VIAL IN NEBULIZER  TWICE DAILY    SODIUM CHLORIDE 7% 7 % NEBULIZER SOLUTION    use one vial in nebulizer twice a day   Changed and/or Refilled Medications    Modified Medication Previous Medication    DONEPEZIL (ARICEPT) 5 MG TABLET donepeziL (ARICEPT) 5 MG tablet       Take 1 tablet (5 mg total) by mouth every evening.    Take 5 mg by mouth every evening.    ESCITALOPRAM OXALATE (LEXAPRO) 10 MG TABLET EScitalopram oxalate (LEXAPRO) 10 MG tablet       Take 1.5 tablets (15 mg total) by mouth once daily.    Take 15 mg by mouth.         CARE TEAM:  Patient Care Team:  Portia Ferreira MD as PCP - General (Internal Medicine)  Dustin Mccarthy III, MD as Consulting Physician (Orthopedic Surgery)  Pierre Rogers MD as Consulting Physician (Urology)  Burak Gurrola MD as Consulting Physician (Pulmonary Disease)  Theo Alvares MD as Consulting Physician (Infectious Diseases)  Suzan Glass LPN as Licensed Practical Nurse  Gilberto Silva OD as Consulting Physician (Optometry)  Thien Joy MD (Inactive) as Consulting Physician (Gastroenterology)  Yuan Lundberg MD as Consulting Physician (Cardiology)  Neel Jimenez MD as Consulting Physician (Neurology)  Paola Rao MD as Consulting Physician (Infectious Diseases)  Fiorella Garcia NP as Nurse Practitioner (Urology)  Carlos Miller MD as Consulting Physician (Neurosurgery)  Ayesha Hall MD as Consulting Physician (Otolaryngology)  Deepa Toussaint, ANDRYP-C (Family Medicine)  Virginia Tobias CNS (Infectious Diseases)       REVIEW OF SYSTEMS:  Review of Systems   Constitutional:   "Negative for chills and fever.   Cardiovascular:  Negative for chest pain and leg swelling.   Gastrointestinal:  Negative for abdominal pain.   Psychiatric/Behavioral:  Negative for sleep disturbance. The patient is nervous/anxious.              Objective    PHYSICAL EXAMINATION/VITALS:  Vitals:    12/20/23 1456   BP: (!) 96/58   Pulse: (!) 50   Temp: 98 °F (36.7 °C)   TempSrc: Oral   SpO2: 97%   Weight: 73.5 kg (162 lb 2.4 oz)   Height: 5' 10" (1.778 m)       Body mass index is 23.27 kg/m².      General appearance - alert, well appearing, and in no distress, normal appearing weight  Psychiatric - alert, oriented to person, place, and time, normal mood and behavior; some word finding difficulties and mild decrease in overall recall/memory      LABS:  No labs needed at this time            ASSESSMENT AND PLAN:   1. Hospital discharge follow-up  I discussed with the patient and his wife the need to establish with a mental health specialist.  They were given the phone number for Psychiatry to call and make an appointment.  I advised him it may take some time to get in.  If [facility] psychiatrist not covered under their insurance they will need to call [employer] to get a list of providers that they can see. His wife voiced understanding.    2. Mild cognitive impairment  He is followed by Neurology.  He appears to be doing well with the low-dose Aricept.  Refill given until he can establish with a specialist who can refill his medication.  He completed an MRI this past weekend which did not show any acute changes.  I believe that his neurologist wants him to see a specialist in the future and they have follow-up with neurology in January.    Overview:  See 4/2020 Neuropsych note    Orders:  -     donepeziL (ARICEPT) 5 MG tablet; Take 1 tablet (5 mg total) by mouth every evening.  Dispense: 90 tablet; Refill: 0    3. Anxiety  Continue Lexapro for now.  They will establish with psychiatry for further evaluation and " future medication refills.  -     EScitalopram oxalate (LEXAPRO) 10 MG tablet; Take 1.5 tablets (15 mg total) by mouth once daily.  Dispense: 145 tablet; Refill: 0    4. HILLARY (mycobacterium avium-intracellulare)  He is followed closely by Infectious Disease.  They recently sent him a message regarding possibly going to [place] for surgical excision of the worse part of his disease lung.  They will follow-up with Infectious Disease as per their recommendations.  Overview:  - diagnosed Fall 2018  - followed by ID  - no need for Rx treatment at this time per Dr. Clark.  In light of evolving rul cavitary lesion, will refer back to ID.  Dr. Clark no longer practice infectious disease.  ID appointment on 10/6/21              No orders of the defined types were placed in this encounter.     FOLLOW UP: Follow up in about 3 months (around 3/20/2024), or if symptoms worsen or fail to improve, for annual exam. or sooner as needed.

## 2023-12-20 NOTE — PATIENT INSTRUCTIONS
Psychiatry:   [facility]: [phone]  [facility]: [phone]

## 2024-01-10 DIAGNOSIS — E78.5 HYPERLIPIDEMIA LDL GOAL <100: ICD-10-CM

## 2024-01-10 RX ORDER — PRAVASTATIN SODIUM 10 MG/1
10 TABLET ORAL NIGHTLY
Qty: 90 TABLET | Refills: 1 | Status: SHIPPED | OUTPATIENT
Start: 2024-01-10

## 2024-01-10 NOTE — TELEPHONE ENCOUNTER
"----- Message from Manuelito Randhawa sent at 1/10/2024 12:50 PM CST -----  Regarding: Addis "wife"  Type: RX Refill Request     Who Called:Addis "wife"      Have you contacted your pharmacy: Yes     Refill or New Rx: Refill      RX Name and Strength: pravastatin (PRAVACHOL) 10 MG tablet    Preferred Pharmacy with phone number:.  [id number] - [place], [place]  [facility] [address] [facility] [address] [address] [address]  [place] [address]  Phone: [phone] Fax: [phone]    Local or Mail Order: local     Ordering Provider: Dr. Portia Ferreira     Would the patient rather a call back or a response via [facility]? Callback      Best Call Back Number: [phone]      Additional Information: Stated that the patient needs his medication filled at this pharmacy. Please reach out to the pharmacy and call the wife once done.     "

## 2024-01-10 NOTE — TELEPHONE ENCOUNTER
Care Due:                  Date            Visit Type   Department     Provider  --------------------------------------------------------------------------------                                             [facility]     MED/ INTERNAL  Tamara Artis  Last Visit: 12-      FOLLOW UP    MED/ PEDS      Vasile Ferreira  Next Visit: None Scheduled  None         None Found                                                            Last  Test          Frequency    Reason                     Performed    Due Date  --------------------------------------------------------------------------------    Lipid Panel.  12 months..  pravastatin..............  03- 03-    Health [facility] Embedded Care Due Messages. Reference number: [id number].   1/10/2024 1:04:19 PM CST

## 2024-01-13 NOTE — TELEPHONE ENCOUNTER
No care due was identified.  Health [facility] Embedded Care Due Messages. Reference number: [id number].   1/13/2024 9:13:00 AM CST

## 2024-01-14 NOTE — TELEPHONE ENCOUNTER
Refill Routing Note   Medication(s) are not appropriate for processing by [facility] for the following reason(s):        New or recently adjusted medication  No active prescription written by provider    [facility] action(s):  Defer               Appointments  past 12m or future 3m with PCP    Date Provider   Last Visit   12/20/2023 Portia Ferreira MD   Next Visit   Visit date not found Portia Ferreira MD   ED visits in past 90 days: 1        Note composed:10:37 AM 01/14/2024

## 2024-01-16 RX ORDER — PANTOPRAZOLE SODIUM 40 MG/1
TABLET, DELAYED RELEASE ORAL
Qty: 90 TABLET | Refills: 1 | Status: SHIPPED | OUTPATIENT
Start: 2024-01-16

## 2024-01-25 ENCOUNTER — OFFICE VISIT (OUTPATIENT)
Dept: NEUROLOGY | Facility: CLINIC | Age: 84
End: 2024-01-25
Payer: MEDICARE

## 2024-01-25 VITALS — DIASTOLIC BLOOD PRESSURE: 67 MMHG | HEART RATE: 61 BPM | SYSTOLIC BLOOD PRESSURE: 100 MMHG

## 2024-01-25 DIAGNOSIS — G31.84 MILD COGNITIVE IMPAIRMENT: ICD-10-CM

## 2024-01-25 DIAGNOSIS — G30.9 ALZHEIMER DISEASE: Primary | ICD-10-CM

## 2024-01-25 DIAGNOSIS — F02.80 ALZHEIMER DISEASE: Primary | ICD-10-CM

## 2024-01-25 PROCEDURE — 99999 PR PBB SHADOW E&M-EST. PATIENT-LVL III: CPT | Mod: PBBFAC,GC,,

## 2024-01-25 PROCEDURE — 99213 OFFICE O/P EST LOW 20 MIN: CPT | Mod: GC,S$GLB,,

## 2024-01-25 RX ORDER — DONEPEZIL HYDROCHLORIDE 5 MG/1
10 TABLET, FILM COATED ORAL NIGHTLY
Qty: 120 TABLET | Refills: 2 | Status: SHIPPED | OUTPATIENT
Start: 2024-01-25 | End: 2024-03-04 | Stop reason: DRUGHIGH

## 2024-01-25 RX ORDER — MEMANTINE HYDROCHLORIDE 5 MG/1
5 TABLET ORAL 2 TIMES DAILY
Qty: 60 TABLET | Refills: 11 | Status: SHIPPED | OUTPATIENT
Start: 2024-01-25 | End: 2025-01-24

## 2024-01-25 NOTE — PROGRESS NOTES
[facility] - NEUROLOGY 7TH [place] [facility], [place]    Date: 1/25/24  Patient Name: Diego Gunter   MRN: [id number]   PCP: Portia Ferreira  Referring Provider: No ref. provider found    Assessment:   Diego Gunter is a 83 y.o. male is here for follow up of cognitive deficits.    Neurofilament light chain, pTau-181, Dementia/Autoimmune/paraneoplastic panel, AD Detect Beta amyloid ratio was positive. Reversible causes of dementia, Folate, MMA, Immunoglobulins, RPR, ZAKI, CRP, ESR, HIV, B1, B12, Homocysteine, Heavy metal, Copper/Ceruloplasmin were normal.    Patient still awaiting neurospsychiatric evaluation. Call placed and will follow up again in the coming week to schedule the patient for the earliest available date.    MRI brain showing cerebral volume loss consistent with symptoms. There appears to be some hippocampal atrophy as well.    Plan:   -- Patient started on donepezil and memantine  -- Follow up with referral to neuropsychiatric evaluation  -- Will refer patient to [facility]  -- Deferring EMG for evaluation of cervical radiculopathy  -- RTC March 2024    Problem List Items Addressed This Visit          Neuro    Mild cognitive impairment    Overview     See 4/2020 Neuropsych note         Relevant Medications    memantine (NAMENDA) 5 MG Tab    donepeziL (ARICEPT) 5 MG tablet    Other Relevant Orders    Ambulatory referral/consult to Neurology     Other Visit Diagnoses       Alzheimer disease    -  Primary    Relevant Medications    memantine (NAMENDA) 5 MG Tab    donepeziL (ARICEPT) 5 MG tablet    Other Relevant Orders    Ambulatory referral/consult to Neurology                01/28/2024  Quinn Arriola MD, [facility]  Neurology resident, PGY-3  Department of Neurology  [facility]    Subjective:     Patient seen in consultation at the request of No ref. provider found for the evaluation of dysphagia.         12/07/2023 Interval: patient  "now reports he no longer has difficulty with swallowing. He recalls previously having difficulties.    He is here for follow up on swallowing difficulties. But he reports he no longer experiences these issues. On the other hand, he recently had an ED visit after an episode where he had thoughts about killing his wife. He was at [facility] for 10 days, where he was started on several medications that the family can't confirm.    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 He then contracted COVID in 1/2021. Following discharge, he had PE. After that, he had compressive epidural hematoma in the cervical for which he had decompression of epidural hematoma and posterior fusion.    But over these couple of years, he is found to have declined cognitively. His social interactions have decreased. Family reports that while at [facility], he was a bit more conversant, but overall, his cognitive decline has been stead but they deny that he has progressively worsened.    He is currently on escitalopram, donepezil, B complex, atorvastatin, Aspirin, Amkidacin.    HPI:   Mr. Diego Gunter is a 83 y.o. male presenting with  dysphagia since [id number]. It did improve after therapy. He had a C3-6 PCF cervical epidural hematoma surgery 4/2022 with Dr. Miller, followed by a month of rehab. However, for the past 6 months or longer, he has noticed his swallowing difficulties returning. PMHx includes prostate cancer (2011), centrilobular emphysema, pulmonary Mycobacterium avium intracellulare infection, hearing impairment, and GERD. Swallowing difficulties with solids and per note from speech, also liquids: "aspiration that were spontaneously cleared by throat clearing and delayed cough" ; prognosis guarded. Reportedly, this is due to an oropharyngeal dysfunction. FL Esophagram, showing weakened pharyngeal constriction and mild elicited gastroesophageal reflux: "Weakened pharyngeal " "constriction/residue.  Instance of flash laryngeal penetration."    Single breath count 18, confounded by baseline lung issues. His wife reports that his volume can sometimes go down as he continues to talk. There is family history of myasthenia gravis in his brother. There was concern about myasthenia. However, patient has also had prior epidural hematoma in the cervical region which may be responsible for his symptoms, though there are doubts as to whether the injury extended to an area that would localize.    Musk antibody and Myasthenia gravis eval (AchR antibody) were both negative.    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 Dr. Pham    Hx of colonic polyps     followed by GI. Dr. Pham    Hyperlipidemia associated with type 2 diabetes mellitus     Hyperlipidemia LDL goal <100     Hypernatremia 05/05/2022    Hypertension associated with type 2 diabetes mellitus     Hypertension associated with type 2 diabetes mellitus     Hypotension     Intermittent confusion 04/29/2022    Overweight(278.02)     Prostate cancer 09/2011    followed by urology, Dr. Rogers    Pulmonary embolism     Reducible right inguinal hernia 10/10/2022    Right hemiparesis 07/22/2022    Secondary to cervical hematoma    Type II or unspecified type diabetes mellitus without mention of complication, not stated as uncontrolled     diet controlled    Urinary retention 04/29/2022       PAST SURGICAL HISTORY:  Past Surgical History:   Procedure Laterality Date    BRONCHOSCOPY N/A " 10/15/2018    Procedure: BRONCHOSCOPY;  Surgeon: [facility] Diagnostic Provider;  Location: [facility] OR 2ND FLR;  Service: Anesthesiology;  Laterality: N/A;    CARPAL TUNNEL RELEASE Right 1/11/2023    Procedure: RELEASE, CARPAL TUNNEL;  Surgeon: Romero Lazo MD;  Location: [facility] OR;  Service: Orthopedics;  Laterality: Right;    CATARACT EXTRACTION, BILATERAL  2014    COLONOSCOPY N/A 5/16/2023    Procedure: COLONOSCOPY;  Surgeon: Buster Sequeira MD;  Location: [facility] ENDO (4TH FLR);  Service: Endoscopy;  Laterality: N/A;    DECOMPRESSION, NERVE, ULNAR Right 1/11/2023    Procedure: DECOMPRESSION, NERVE, ULNAR - right cub jolie and guyon's decompression as well as open right carpal tunnel release;  Surgeon: Romero Lazo MD;  Location: [facility] OR;  Service: Orthopedics;  Laterality: Right;    ESOPHAGOGASTRODUODENOSCOPY N/A 5/16/2023    Procedure: EGD (ESOPHAGOGASTRODUODENOSCOPY);  Surgeon: Buster Sequeira MD;  Location: [facility] (4TH FLR);  Service: Endoscopy;  Laterality: N/A;  inst mailed-RB  5/10/23 no answer for precall/mleone lpn    POSTERIOR CERVICAL LAMINECTOMY Bilateral 4/28/2022    Procedure: LAMINECTOMY, SPINE, CERVICAL, POSTERIOR APPROACH C3-6;  Surgeon: Carlos Miller MD;  Location: [facility] OR 2ND FLR;  Service: Neurosurgery;  Laterality: Bilateral;    PROSTATECTOMY      REPAIR, HERNIA, INGUINAL, WITHOUT HISTORY OF PRIOR REPAIR, AGE 5 YEARS OR OLDER Right 10/10/2022    Procedure: REPAIR, HERNIA, INGUINAL, WITHOUT HISTORY OF PRIOR REPAIR, AGE 5 YEARS OR OLDER;  Surgeon: Dario Esquivel MD;  [facility];  Service: General;  Laterality: Right;  open right inguinal hernia repair with mesh       CURRENT MEDS:  Current Outpatient Medications   Medication Sig Dispense Refill    ARIKAYCE 590 mg/8.4 mL NbSp       aspirin (ECOTRIN) 81 MG EC tablet Take 81 mg by mouth once daily.      azelastine (ASTELIN) 137 mcg (0.1 %) nasal spray 2 sprays (274 mcg total) by Nasal route 2 (two) times daily. 30 mL 0    b complex  vitamins capsule Take 1 capsule by mouth once daily.      EScitalopram oxalate (LEXAPRO) 10 MG tablet Take 1.5 tablets (15 mg total) by mouth once daily. 145 tablet 0    nebulizer and compressor ([employer] COMPRESSOR SYSTEM) Marcy use to administer nebulized medication as directed 1 each 0    pantoprazole (PROTONIX) 40 MG tablet TAKE 1 TABLET(40 MG) BY MOUTH EVERY DAY 30 MINUTES BEFORE BREAKFAST 90 tablet 1    pravastatin (PRAVACHOL) 10 MG tablet Take 1 tablet (10 mg total) by mouth every evening. 90 tablet 1    sodium chloride 3% 3 % nebulizer solution USE 4 ML VIAL IN NEBULIZER  TWICE DAILY 480 mL 11    sodium chloride 7% 7 % nebulizer solution use one vial in nebulizer twice a day 500 mL 11    albuterol (VENTOLIN HFA) 90 mcg/actuation inhaler Inhale 2 puffs into the lungs daily as needed (30 min before arikayce). Rescue (Patient not taking: Reported on 9/18/2023) 18 g 11    donepeziL (ARICEPT) 5 MG tablet Take 2 tablets (10 mg total) by mouth every evening. 120 tablet 2    memantine (NAMENDA) 5 MG Tab Take 1 tablet (5 mg total) by mouth 2 (two) times daily. Initially 5mg daily for 1 week, and then increased to 5mg twice a day. 60 tablet 11     No current facility-administered medications for this visit.       ALLERGIES:  Review of patient's allergies indicates:  No Known Allergies    FAMILY HISTORY:  Family History   Problem Relation Age of Onset    Colon cancer Mother     Diabetes Mother     Heart disease Father     Mental illness Sister     Diabetes Sister     Other Brother         polio as a child    Pulmonary fibrosis Brother     Diabetes Brother     Myasthenia gravis Brother     Parkinsonism Brother     Diabetes Brother     Dementia Brother     Lung cancer Brother         smoker    Diabetes Maternal Aunt     Diabetes Other     Esophageal cancer Neg Hx        SOCIAL HISTORY:  Social History     Tobacco Use    Smoking status: Former     Current packs/day: 0.00     Average packs/day: 0.3 packs/day for 5.0 years (1.3  ttl pk-yrs)     Types: Cigarettes     Start date: 10/2/1957     Quit date: 10/2/1962     Years since quittin.3    Smokeless tobacco: Former    Tobacco comments:     quit age 23   Substance Use Topics    Alcohol use: Not Currently    Drug use: No       Review of Systems:  12 system review of systems is negative except for the symptoms mentioned in HPI.        Objective:     Vitals:    24 0959   BP: 100/67   Pulse: 61     General: NAD, well nourished   Eyes: no tearing, discharge, no erythema   ENT: moist mucous membranes of the oral cavity, nares patent    Neck: Supple, full range of motion  Cardiovascular: Warm and well perfused, pulses equal and symmetrical  Lungs: Normal work of breathing, normal chest wall excursions  Skin: No rash, lesions, or breakdown on exposed skin  Psychiatry: Mood and affect are appropriate   Abdomen: soft, non tender, non distended  Extremeties: No cyanosis, clubbing or edema.        Neurological Exam:    MENTAL STATUS  Level of consciousness: alert  Orientation: oriented to person, place, and year  Attention normal. Concentration normal.  Speech: abnormalities noted in vocalization, reduced volume and clarity of speech; confounded by patient's reporting that he sometimes has difficulty with expressive aphasia    CRANIAL NERVES  CN II: Visual fields full to confrontation  CN III, IV, VI: PERRL, EOMI  CN V: Facial sensation intact  CN VII: Facial expression symmetric and full  CN VIII: Hearing intact to finger rub  CN IX, X: Symmetric palate elevation. Phonation normal  CN XI: Shoulder shrug (limited by pain on the right) and head turn intact bilaterally normal  CN XII: Tongue midline with normal movements, no atrophy    MOTOR EXAM  Muscle bulk: normal  Muscle tone: normal  Pronator drift: absent    Strength - Upper Extremities   Arm abduction Elbow flexion Elbow extension Wrist flexion Wrist extension Finger abduction   Right 5/5 5/5 5/5 5/5 5/5 5/5   Left 5/5 5/5 5/5 5/5 5/5  5/5     Strength - Lower Extremities   Hip flexion Knee flexion Knee extension Dorsiflexion Plantarflexion   Right 5/5 5/5 5/5 5/5 5/5   Left 5/5 5/5 5/5 5/5 5/5       REFLEXES   Biceps Triceps Brachioradialis Patellar Achilles   Right +2 +2 +2 +2 +2   Left +2 +2 +2 +2 +2     Toes down bilaterally    SENSORY EXAM  Light touch: intact in all 4 extremities    COORDINATION  Finger to nose: normal  Gait  is slowed, slightly apraxic, reduced arm swing, base, and turning  Romberg negative

## 2024-01-30 LAB
ACID FAST MOD KINY STN SPEC: ABNORMAL
LEFT EYE DM RETINOPATHY: NEGATIVE
MYCOBACTERIUM SPEC QL CULT: ABNORMAL
MYCOBACTERIUM SPEC QL CULT: ABNORMAL
RIGHT EYE DM RETINOPATHY: NEGATIVE

## 2024-01-31 ENCOUNTER — PATIENT MESSAGE (OUTPATIENT)
Dept: INFECTIOUS DISEASES | Facility: CLINIC | Age: 84
End: 2024-01-31
Payer: MEDICARE

## 2024-01-31 ENCOUNTER — TELEPHONE (OUTPATIENT)
Dept: NEUROLOGY | Facility: CLINIC | Age: 84
End: 2024-01-31
Payer: MEDICARE

## 2024-01-31 ENCOUNTER — PATIENT MESSAGE (OUTPATIENT)
Dept: NEUROLOGY | Facility: CLINIC | Age: 84
End: 2024-01-31
Payer: MEDICARE

## 2024-01-31 DIAGNOSIS — R41.89 MODERATE COGNITIVE IMPAIRMENT: Primary | ICD-10-CM

## 2024-01-31 DIAGNOSIS — F03.90 DEMENTIA, UNSPECIFIED DEMENTIA SEVERITY, UNSPECIFIED DEMENTIA TYPE, UNSPECIFIED WHETHER BEHAVIORAL, PSYCHOTIC, OR MOOD DISTURBANCE OR ANXIETY: ICD-10-CM

## 2024-01-31 NOTE — TELEPHONE ENCOUNTER
----- Message from Quinn Arriola MD sent at 1/31/2024  9:57 AM CST -----  Regarding: Referral to Dr. Pineda  Good morning,    I was hoping to schedule this patient with Dr. Pineda at his earliest convenience. The family is fairly concerned. His grandson is very reasonable and involved and he also shares these concerns.    Please let me know if there are any questions or concerns.    Thanks,  Quinn

## 2024-02-07 ENCOUNTER — TELEPHONE (OUTPATIENT)
Dept: INFECTIOUS DISEASES | Facility: CLINIC | Age: 84
End: 2024-02-07
Payer: MEDICARE

## 2024-02-07 ENCOUNTER — PATIENT MESSAGE (OUTPATIENT)
Dept: RESEARCH | Facility: CLINIC | Age: 84
End: 2024-02-07
Payer: MEDICARE

## 2024-02-07 DIAGNOSIS — A31.0 MAI (MYCOBACTERIUM AVIUM-INTRACELLULARE): Primary | ICD-10-CM

## 2024-02-07 DIAGNOSIS — J98.4 CAVITARY LUNG DISEASE: Primary | ICD-10-CM

## 2024-02-07 RX ORDER — ETHAMBUTOL HYDROCHLORIDE 400 MG/1
800 TABLET, FILM COATED ORAL DAILY
Qty: 60 TABLET | Refills: 5 | Status: SHIPPED | OUTPATIENT
Start: 2024-02-07 | End: 2024-03-17 | Stop reason: SDUPTHER

## 2024-02-07 NOTE — PROGRESS NOTES
[facility]. Patient contacted to arrange  of drug.

## 2024-02-07 NOTE — TELEPHONE ENCOUNTER
Dr Rao, can you give his grandson Jayesh Gunter at [phone]?          ----- Message from CRYSTAL Jimenez sent at 2/7/2024  4:00 PM CST -----  Hello,    I just spoke to Mrs. Gunter and they will  the Clofazimine on Friday in the clinic.     She was asking about a prescription for any other medications they need. It looks like may need the Ethamutol sent in? She said they use [facility] and would like for someone to call their grandson Jayesh with instructions on how and what he needs to take.    Thanks,  Virginia  ----- Message -----  From: Paola Rao MD  Sent: 2/7/2024  11:17 AM CST  To: CRYSTAL Jimenez    Can you pleas request clofazamine refill. He should have a 3 month supply already, but he's saying he only has 1 week. If he's truly  out, maybe we could re-allocate supply for him?       ----- Message -----  From: Natalia Estrada MA  Sent: 1/31/2024   2:53 PM CST  To: Paola Rao MD    Hi Dr. Rao    Called pt back, spoke to his wife    She requested to have a referral fax over.     I will mail him the application for the program enrollment.      Pt will start the clofazimine and ethambutol today, and once the arikacye is approved and mail out to the pt, he will start the arikacye. He has 1 week worth of clofazimine and ethambutol, please send a refill to [facility].    Pt is not able to afford the tedezolid.     He has an appointment with you in March. A recall was placed to follow up in 6 months with Ct prior appointment.     Wife requested that you call their grandson Jayesh Gunter at [phone] to discuss the patients care and treatment.     ----- Message -----  From: Paola Rao MD  Sent: 1/31/2024   3:35 AM CST  To: Natalia Estrada MA    I can't confirm it would be covered, but if they're interested I can place referral and then they can get in touch with adminstration at [facility] to discuss cost.     Can you help him with the arikayce program you mentioned?      He should have the clofazamine, ethambutol, arikacye already. The tedezolid was expensive and not sure if he was able to obtain    He needs 6 mo f/u from last visit with CT chest prior. He can either resume all abx now or wait until next visit to discuss.     ----- Message -----  From: Natalia Estrada MA  Sent: 1/26/2024   3:47 PM CST  To: Paola Rao MD    Spoke to pt's wife, Addis    Stated going to [place] spending Money on the trip does not make sense to them. They are an elderly couple that is not able to spend any extra money.   She wants a confirmation that the appointment with the cardiothoracic doctor will be cover at 100%, before making decisions.     In regards to the medications, he is willing to restart the medication.  Pt will have to re-apply for the Arikayce copayment assistance program.     Pt and wife, requested to have you call their grandson to discuss all of the patients care.   Jayesh Jani [phone]    ----- Message -----  From: Paola Rao MD  Sent: 1/26/2024   1:45 PM CST  To: Dee Dee Cuellar MA    I sent the patient a message a while ago. It's been read, but no response. Can you follow up with them to see what they want to do? Happy to see him for f/u    ----- Message -----  From: Amos, [facility] Interface  Sent: 11/8/2023   3:21 PM CST  To: Paola Rao MD

## 2024-02-08 ENCOUNTER — PATIENT OUTREACH (OUTPATIENT)
Dept: ADMINISTRATIVE | Facility: HOSPITAL | Age: 84
End: 2024-02-08
Payer: MEDICARE

## 2024-02-08 DIAGNOSIS — E78.49 OTHER HYPERLIPIDEMIA: Primary | ICD-10-CM

## 2024-02-08 DIAGNOSIS — R73.03 PREDIABETES: ICD-10-CM

## 2024-02-08 RX ORDER — INFLUENZA A VIRUS A/VICTORIA/4897/2022 IVR-238 (H1N1) ANTIGEN (FORMALDEHYDE INACTIVATED), INFLUENZA A VIRUS A/DARWIN/9/2021 SAN-010 (H3N2) ANTIGEN (FORMALDEHYDE INACTIVATED), INFLUENZA B VIRUS B/PHUKET/3073/2013 ANTIGEN (FORMALDEHYDE INACTIVATED), AND INFLUENZA B VIRUS B/MICHIGAN/01/2021 ANTIGEN (FORMALDEHYDE INACTIVATED) 60; 60; 60; 60 UG/.7ML; UG/.7ML; UG/.7ML; UG/.7ML
INJECTION, SUSPENSION INTRAMUSCULAR
COMMUNITY
Start: 2023-10-03

## 2024-02-08 RX ORDER — COVID-19 VACCINE, MRNA 0.04 MG/.418ML
INJECTION, SUSPENSION INTRAMUSCULAR
COMMUNITY
Start: 2023-10-03

## 2024-02-08 RX ORDER — VITAMIN B COMPLEX/FOLIC ACID 0.4 MG
TABLET ORAL
COMMUNITY
Start: 2023-12-04 | End: 2024-03-20 | Stop reason: SDUPTHER

## 2024-02-08 RX ORDER — ATORVASTATIN CALCIUM 40 MG/1
40 TABLET, FILM COATED ORAL NIGHTLY
COMMUNITY
Start: 2023-12-04

## 2024-02-08 RX ORDER — ATORVASTATIN CALCIUM 10 MG/1
10 TABLET, FILM COATED ORAL NIGHTLY
COMMUNITY
Start: 2023-12-04

## 2024-02-09 ENCOUNTER — TELEPHONE (OUTPATIENT)
Dept: INFECTIOUS DISEASES | Facility: CLINIC | Age: 84
End: 2024-02-09
Payer: MEDICARE

## 2024-02-09 ENCOUNTER — DOCUMENTATION ONLY (OUTPATIENT)
Dept: RESEARCH | Facility: CLINIC | Age: 84
End: 2024-02-09
Payer: MEDICARE

## 2024-02-09 DIAGNOSIS — J98.4 CAVITARY LUNG DISEASE: Primary | ICD-10-CM

## 2024-02-09 NOTE — PROGRESS NOTES
Lamprene (Clofazimine) Multiple Patient Program  Sponsor: [facility]  IRB/Protocol #: 2020.098  Principle Investigator: Dr. Paola Ordoñez  Site Number: [id number]     Mr. 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 He came to ID clinic today (02/09/2024) to  refill of drug that [facility]. Instructed to take as prescribed: 100 mg by mouth once a day with food. Discussed with patient and his wife that their grandson, Jayesh, has requested to talk with Dr. Rao regarding recent sputum culture results and treatment plan prior to patient starting Clofazimine and Ethambutol. Message sent to Dr. Rao to call Jayesh. Please reach out to ID clinic when next refill needed and call if any questions or concerns. Contact number provided.

## 2024-02-09 NOTE — TELEPHONE ENCOUNTER
Spoke with pt's grandson. Repeat CT scan to eval for progression from prior. They're getting the medications with plan of restarting. Possibly considering surgical eval pending results of repeat scan. Says grandfather recently diagnosed with alzheimer's. Psychosis improved with dementia meds. They will hold off on starting meds until appt after scan          Hi Dr. Rao    The patient will be going out off town next week. We are unable to schedule a virtual appointment.   Spoke to Mr. Mcconnell, he requested to have you call him to discuss the plan. He apologized for not answering the phone when you called, stated your phone calls had no caller ID and thought they were spam calls.   He will be in the lookout for you phone call.          ----- Message from Paola Rao MD sent at 2/9/2024  1:16 PM CST -----    Can you set up a phone call next week to discuss. We keep playing phone-tag. Could also see him on Wed at  clinic (i''ll be in the hospital but can come over).     His most recent cultures are not resistant to clofazamine or the inhaled arikayce. I'm very nervous about a two drug regimen, but I requested an e-consult from [facility] and they recommended the following (I sent him a message regarding this as well). The patient declined travel to [place]. The patient said the tedezolid is too expensive previously, but Ms Craven had told them about patient assistance program that could help somewhat        Recommendation:     They recommended that I get you set up at the adult day unit at [facility] to get an evaluation by their cardiothoracic surgeon (to see if they can offer you surgical resection of your infection). If you are able to travel to [place] and interested in this, please let me know and I'll call the physician line to get you referred. This is a different program than their regular 10 day program.      They also recommended continuing clofazamine, ethambutol, rifampin, and  inhaled arikayce (and tezolid and bedaquiline if not cost prohibitive).         ----- Message -----  From: Virginia Tobias, CNS  Sent: 2/9/2024  12:11 PM CST  To: MD Dr. Maira Barnes,    I spoke to Mr. Gunter's grandson today. They are coming to  the Clofazimine but he would like you to call him to discuss the plan before his grandpa starts taking it. He said he was sorry he had missed your calls.     He was questioning the medications and the culture results and making sure no resistance to these drugs. I saw the results from last culture and it looks like no resistance to drugs you are starting, but didn't want to promise that.     Thanks,  Virginia

## 2024-02-17 ENCOUNTER — TELEPHONE (OUTPATIENT)
Dept: INFECTIOUS DISEASES | Facility: CLINIC | Age: 84
End: 2024-02-17
Payer: MEDICARE

## 2024-02-18 NOTE — TELEPHONE ENCOUNTER
After several attempts, I was finally able to talk to pt's grandson Jayesh. He reports pt was recently diagnosed with Alzheimers. They are open to resuming antibiotics, but want to wait on repeat CT scan      ----- Message from Natalia Estrada MA sent at 1/26/2024  3:36 PM CST -----  Spoke to pt's wife, Addis    Stated going to [place] spending Money on the trip does not make sense to them. They are an elderly couple that is not able to spend any extra money.   She wants a confirmation that the appointment with the cardiothoracic doctor will be cover at 100%, before making decisions.     In regards to the medications, he is willing to restart the medication.  Pt will have to re-apply for the Arikayce copayment assistance program.     Pt and wife, requested to have you call their grandson to discuss all of the patients care.   Jayesh Gunter [phone]    ----- Message -----  From: Paola Rao MD  Sent: 1/26/2024   1:45 PM CST  To: Dee Dee Cuellar MA    I sent the patient a message a while ago. It's been read, but no response. Can you follow up with them to see what they want to do? Happy to see him for f/u    ----- Message -----  From: Amos [employer] Interface  Sent: 11/8/2023   3:21 PM CST  To: Paola Rao MD

## 2024-02-19 ENCOUNTER — HOSPITAL ENCOUNTER (OUTPATIENT)
Dept: RADIOLOGY | Facility: HOSPITAL | Age: 84
Discharge: HOME | End: 2024-02-19
Attending: INTERNAL MEDICINE
Payer: MEDICARE

## 2024-02-19 ENCOUNTER — PATIENT MESSAGE (OUTPATIENT)
Dept: ADMINISTRATIVE | Facility: HOSPITAL | Age: 84
End: 2024-02-19
Payer: MEDICARE

## 2024-02-19 DIAGNOSIS — J98.4 CAVITARY LUNG DISEASE: ICD-10-CM

## 2024-02-19 PROCEDURE — 71250 CT THORAX DX C-: CPT | Mod: TC

## 2024-02-19 PROCEDURE — 71250 CT THORAX DX C-: CPT | Mod: 26,,, | Performed by: RADIOLOGY

## 2024-02-20 ENCOUNTER — PATIENT OUTREACH (OUTPATIENT)
Dept: ADMINISTRATIVE | Facility: HOSPITAL | Age: 84
End: 2024-02-20
Payer: MEDICARE

## 2024-02-20 NOTE — PROGRESS NOTES
Diabetes Labs will be due soon - appointment scheduled on 03/08/2024. Fasting instructions were given.

## 2024-02-21 ENCOUNTER — OFFICE VISIT (OUTPATIENT)
Dept: INFECTIOUS DISEASES | Facility: CLINIC | Age: 84
End: 2024-02-21
Payer: MEDICARE

## 2024-02-21 ENCOUNTER — HOSPITAL ENCOUNTER (OUTPATIENT)
Dept: CARDIOLOGY | Facility: HOSPITAL | Age: 84
Discharge: HOME | End: 2024-02-21
Attending: INTERNAL MEDICINE
Payer: MEDICARE

## 2024-02-21 VITALS
BODY MASS INDEX: 23.02 KG/M2 | OXYGEN SATURATION: 95 % | HEIGHT: 70 IN | SYSTOLIC BLOOD PRESSURE: 127 MMHG | DIASTOLIC BLOOD PRESSURE: 70 MMHG | WEIGHT: 160.81 LBS | HEART RATE: 51 BPM

## 2024-02-21 DIAGNOSIS — A31.0 MYCOBACTERIUM AVIUM-INTRACELLULARE COMPLEX: Primary | ICD-10-CM

## 2024-02-21 DIAGNOSIS — A31.0 MYCOBACTERIUM AVIUM-INTRACELLULARE COMPLEX: ICD-10-CM

## 2024-02-21 LAB
OHS QRS DURATION: 90 MS
OHS QTC CALCULATION: 405 MS

## 2024-02-21 PROCEDURE — 99215 OFFICE O/P EST HI 40 MIN: CPT | Mod: S$GLB,,, | Performed by: INTERNAL MEDICINE

## 2024-02-21 PROCEDURE — 99999 PR PBB SHADOW E&M-EST. PATIENT-LVL V: CPT | Mod: PBBFAC,,,

## 2024-02-21 PROCEDURE — 93010 ELECTROCARDIOGRAM REPORT: CPT | Mod: ,,, | Performed by: INTERNAL MEDICINE

## 2024-02-21 PROCEDURE — 93005 ELECTROCARDIOGRAM TRACING: CPT

## 2024-02-23 ENCOUNTER — TELEPHONE (OUTPATIENT)
Dept: INFECTIOUS DISEASES | Facility: CLINIC | Age: 84
End: 2024-02-23
Payer: MEDICARE

## 2024-02-23 NOTE — TELEPHONE ENCOUNTER
Submitted request for [employer] assistant program     ----- Message from Melvi Curtis sent at 2/23/2024 11:34 AM CST -----  Contact: [phone]  PATIENTCALL     Pt is calling to speak with Natalia in provider office regarding she states that Dr Rao told her to call her. She is asking for a return call please call.

## 2024-03-05 ENCOUNTER — OFFICE VISIT (OUTPATIENT)
Dept: NEUROLOGY | Facility: CLINIC | Age: 84
End: 2024-03-05
Payer: MEDICARE

## 2024-03-05 VITALS
DIASTOLIC BLOOD PRESSURE: 69 MMHG | SYSTOLIC BLOOD PRESSURE: 110 MMHG | WEIGHT: 161.63 LBS | BODY MASS INDEX: 24.49 KG/M2 | HEIGHT: 68 IN

## 2024-03-05 DIAGNOSIS — F03.92 NEURODEGENERATIVE DEMENTIA WITH PSYCHOTIC DISTURBANCE: ICD-10-CM

## 2024-03-05 PROCEDURE — 99999 PR PBB SHADOW E&M-EST. PATIENT-LVL IV: CPT | Mod: PBBFAC,GC,,

## 2024-03-05 PROCEDURE — 99214 OFFICE O/P EST MOD 30 MIN: CPT | Mod: GC,S$GLB,,

## 2024-03-05 NOTE — PROGRESS NOTES
[facility] - NEUROLOGY 7TH [place] [facility], [place]    Date: 3/5/24  Patient Name: Diego Gunter   MRN: [id number]   PCP: Portia Ferreira  Referring Provider: No ref. provider found    Assessment:   Diego Gunter is a 83 y.o. male is here for a brief follow up of cognitive deficits. Patient reports significant improvement with prescribed donepezil and memantine. He also reports vivid dreams, but otherwise both patient and family report increased energy levels, awareness and recall.    Plan:   -- No further changes to medication regimen  -- RTC as needed    Problem List Items Addressed This Visit          Neuro    Neurodegenerative dementia with psychotic disturbance         03/25/2024  Quinn Arriola MD, [facility] [place]  Neurology resident, PGY-3  Department of Neurology  [facility]    Subjective:     Patient seen in consultation at the request of No ref. provider found for the evaluation of dysphagia.       1/25/2024 Interval  Neurofilament light chain, pTau-181, Dementia/Autoimmune/paraneoplastic panel, AD Detect Beta amyloid ratio was positive. Reversible causes of dementia, Folate, MMA, Immunoglobulins, RPR, ZAKI, CRP, ESR, HIV, B1, B12, Homocysteine, Heavy metal, Copper/Ceruloplasmin were normal.    Patient still awaiting neurospsychiatric evaluation. Call placed and will follow up again in the coming week to schedule the patient for the earliest available date.    MRI brain showing cerebral volume loss consistent with symptoms. There appears to be some hippocampal atrophy as well.  -- Patient started on donepezil and memantine  -- Follow up with referral to neuropsychiatric evaluation  -- Will refer patient to [facility]  -- Deferring EMG for evaluation of cervical radiculopathy    12/07/2023 Interval: patient now reports he no longer has difficulty with swallowing. He recalls previously having difficulties.    He is here for follow up on  "swallowing difficulties. But he reports he no longer experiences these issues. On the other hand, he recently had an ED visit after an episode where he had thoughts about killing his wife. He was at [facility] for 10 days, where he was started on several medications that the family can't confirm.    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 He then contracted COVID in 1/2021. Following discharge, he had PE. After that, he had compressive epidural hematoma in the cervical for which he had decompression of epidural hematoma and posterior fusion.    But over these couple of years, he is found to have declined cognitively. His social interactions have decreased. Family reports that while at [facility], he was a bit more conversant, but overall, his cognitive decline has been stead but they deny that he has progressively worsened.    He is currently on escitalopram, donepezil, B complex, atorvastatin, Aspirin, Amkidacin.    HPI:   Mr. Diego Gunter is a 83 y.o. male presenting with  dysphagia since [id number]. It did improve after therapy. He had a C3-6 PCF cervical epidural hematoma surgery 4/2022 with Dr. Miller, followed by a month of rehab. However, for the past 6 months or longer, he has noticed his swallowing difficulties returning. PMHx includes prostate cancer (2011), centrilobular emphysema, pulmonary Mycobacterium avium intracellulare infection, hearing impairment, and GERD. Swallowing difficulties with solids and per note from speech, also liquids: "aspiration that were spontaneously cleared by throat clearing and delayed cough" ; prognosis guarded. Reportedly, this is due to an oropharyngeal dysfunction. FL Esophagram, showing weakened pharyngeal constriction and mild elicited gastroesophageal reflux: "Weakened pharyngeal constriction/residue.  Instance of flash laryngeal penetration."    Single breath count 18, confounded by baseline lung issues. His wife " reports that his volume can sometimes go down as he continues to talk. There is family history of myasthenia gravis in his brother. There was concern about myasthenia. However, patient has also had prior epidural hematoma in the cervical region which may be responsible for his symptoms, though there are doubts as to whether the injury extended to an area that would localize.    Musk antibody and Myasthenia gravis eval (AchR antibody) were both negative.    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 Dr. Pham    Hx of colonic polyps     followed by GI. Dr. Pham    Hyperlipidemia associated with type 2 diabetes mellitus     Hyperlipidemia LDL goal <100     Hypernatremia 05/05/2022    Hypertension associated with type 2 diabetes mellitus     Hypertension associated with type 2 diabetes mellitus     Hypotension     Intermittent confusion 04/29/2022    Overweight(278.02)     Prostate cancer 09/2011    followed by urology, Dr. Rogers    Pulmonary embolism     Reducible right inguinal hernia 10/10/2022    Right hemiparesis 07/22/2022    Secondary to cervical hematoma    Type II or unspecified type diabetes mellitus without mention of complication, not stated as uncontrolled     diet controlled    Urinary retention 04/29/2022       PAST SURGICAL HISTORY:  Past Surgical History:   Procedure Laterality Date    BRONCHOSCOPY N/A 10/15/2018    Procedure: BRONCHOSCOPY;  Surgeon: Pratibha Diagnostic Provider;  Location: [facility] OR [address] [place] [address];  Service: Anesthesiology;  Laterality:  N/A;    CARPAL TUNNEL RELEASE Right 1/11/2023    Procedure: RELEASE, CARPAL TUNNEL;  Surgeon: Romero Lazo MD;  Location: [facility] OR;  Service: Orthopedics;  Laterality: Right;    CATARACT EXTRACTION, BILATERAL  2014    COLONOSCOPY N/A 5/16/2023    Procedure: COLONOSCOPY;  Surgeon: Buster Sequeira MD;  Location: [facility] ENDO (4TH FLR);  Service: Endoscopy;  Laterality: N/A;    DECOMPRESSION, NERVE, ULNAR Right 1/11/2023    Procedure: DECOMPRESSION, NERVE, ULNAR - right cub jolie and guyon's decompression as well as open right carpal tunnel release;  Surgeon: Romero Lazo MD;  Location: [facility] OR;  Service: Orthopedics;  Laterality: Right;    ESOPHAGOGASTRODUODENOSCOPY N/A 5/16/2023    Procedure: EGD (ESOPHAGOGASTRODUODENOSCOPY);  Surgeon: Buster Sequeira MD;  Location: [facility] (4TH FLR);  Service: Endoscopy;  Laterality: N/A;  inst mailed-RB  5/10/23 no answer for precall/mleone lpn    POSTERIOR CERVICAL LAMINECTOMY Bilateral 4/28/2022    Procedure: LAMINECTOMY, SPINE, CERVICAL, POSTERIOR APPROACH C3-6;  Surgeon: Carlos Miller MD;  Location: [facility] OR 2ND FLR;  Service: Neurosurgery;  Laterality: Bilateral;    PROSTATECTOMY      REPAIR, HERNIA, INGUINAL, WITHOUT HISTORY OF PRIOR REPAIR, AGE 5 YEARS OR OLDER Right 10/10/2022    Procedure: REPAIR, HERNIA, INGUINAL, WITHOUT HISTORY OF PRIOR REPAIR, AGE 5 YEARS OR OLDER;  Surgeon: Dario Esquivel MD;  Location: [facility] OR 2ND FLR;  Service: General;  Laterality: Right;  open right inguinal hernia repair with mesh       CURRENT MEDS:  Current Outpatient Medications   Medication Sig Dispense Refill    ARIKAYCE 590 mg/8.4 mL NbSp       aspirin (ECOTRIN) 81 MG EC tablet Take 81 mg by mouth once daily.      atorvastatin (LIPITOR) 10 MG tablet Take 10 mg by mouth every evening.      atorvastatin (LIPITOR) 40 MG tablet Take 40 mg by mouth every evening.      azelastine (ASTELIN) 137 mcg (0.1 %) nasal spray 2 sprays (274 mcg total) by Nasal route 2 (two) times daily. (Patient  not taking: Reported on 3/13/2024) 30 mL 0    b complex vitamins capsule Take 1 capsule by mouth once daily.      COMIRNATY 2023-24, 12Y UP,,PF, 30 mcg/0.3 mL inection       donepeziL (ARICEPT) 10 MG tablet TAKE 1 TABLET (10 MG TOTAL) BY MOUTH ONCE DAILY 30 tablet 4    FLUZONE HIGHDOSE QUAD 23-24  mcg/0.7 mL Syrg       INV clofazimine capsule Take 2 capsules (100 mg total) by mouth once daily with meals. For Investigational Use Only. Patient Study Req ID# [id number]. Protocol: [id number] 200 capsule 0    memantine (NAMENDA) 5 MG Tab Take 1 tablet (5 mg total) by mouth 2 (two) times daily. Initially 5mg daily for 1 week, and then increased to 5mg twice a day. 60 tablet 11    nebulizer and compressor ([employer] COMPRESSOR SYSTEM) Marcy use to administer nebulized medication as directed 1 each 0    pantoprazole (PROTONIX) 40 MG tablet TAKE 1 TABLET(40 MG) BY MOUTH EVERY DAY 30 MINUTES BEFORE BREAKFAST 90 tablet 1    pravastatin (PRAVACHOL) 10 MG tablet Take 1 tablet (10 mg total) by mouth every evening. 90 tablet 1    sodium chloride 3% 3 % nebulizer solution USE 4 ML VIAL IN NEBULIZER  TWICE DAILY (Patient not taking: Reported on 3/13/2024) 480 mL 11    albuterol (VENTOLIN HFA) 90 mcg/actuation inhaler Inhale 2 puffs into the lungs daily as needed (30 min before arikayce). Rescue (Patient not taking: Reported on 9/18/2023) 18 g 11    EScitalopram oxalate (LEXAPRO) 10 MG tablet Take 1.5 tablets (15 mg total) by mouth once daily. 145 tablet 1    ethambutoL (MYAMBUTOL) 400 MG Tab Take 2 tablets (800 mg total) by mouth once daily. 60 tablet 5    sodium chloride 7% 7 % nebulizer solution use one vial in nebulizer twice a day 500 mL 11     No current facility-administered medications for this visit.       ALLERGIES:  Review of patient's allergies indicates:  No Known Allergies    FAMILY HISTORY:  Family History   Problem Relation Age of Onset    Colon cancer Mother     Diabetes Mother     Heart disease Father     Mental  "illness Sister     Diabetes Sister     Other Brother         polio as a child    Pulmonary fibrosis Brother     Diabetes Brother     Myasthenia gravis Brother     Parkinsonism Brother     Diabetes Brother     Dementia Brother     Lung cancer Brother         smoker    Diabetes Maternal Aunt     Diabetes Other     Esophageal cancer Neg Hx        SOCIAL HISTORY:  Social History     Tobacco Use    Smoking status: Former     Current packs/day: 0.00     Average packs/day: 0.3 packs/day for 5.0 years (1.3 ttl pk-yrs)     Types: Cigarettes     Start date: 10/2/1957     Quit date: 10/2/1962     Years since quittin.5    Smokeless tobacco: Former    Tobacco comments:     quit age 23   Substance Use Topics    Alcohol use: Not Currently    Drug use: No       Review of Systems:  12 system review of systems is negative except for the symptoms mentioned in HPI.        Objective:     Vitals:    24 1537   BP: 110/69   Pulse: (P) 60   Weight: 73.3 kg (161 lb 9.6 oz)   Height: 5' 8" (1.727 m)     General: NAD, well nourished   Eyes: no tearing, discharge, no erythema   ENT: moist mucous membranes of the oral cavity, nares patent    Neck: Supple, full range of motion  Cardiovascular: Warm and well perfused, pulses equal and symmetrical  Lungs: Normal work of breathing, normal chest wall excursions  Skin: No rash, lesions, or breakdown on exposed skin  Psychiatry: Mood and affect are appropriate   Abdomen: soft, non tender, non distended  Extremeties: No cyanosis, clubbing or edema.        Neurological Exam:    MENTAL STATUS  Level of consciousness: alert  Orientation: oriented to person, place, and year  Attention normal. Concentration normal.  Speech: abnormalities noted in vocalization, reduced volume and clarity of speech; confounded by patient's reporting that he sometimes has difficulty with expressive aphasia    CRANIAL NERVES  CN II: Visual fields full to confrontation  CN III, IV, VI: PERRL, EOMI  CN V: Facial " sensation intact  CN VII: Facial expression symmetric and full  CN VIII: Hearing intact to finger rub  CN IX, X: Symmetric palate elevation. Phonation normal  CN XI: Shoulder shrug (limited by pain on the right) and head turn intact bilaterally normal  CN XII: Tongue midline with normal movements, no atrophy    MOTOR EXAM  Muscle bulk: normal  Muscle tone: normal  Pronator drift: absent    Strength - Upper Extremities   Arm abduction Elbow flexion Elbow extension Wrist flexion Wrist extension Finger abduction   Right 5/5 5/5 5/5 5/5 5/5 5/5   Left 5/5 5/5 5/5 5/5 5/5 5/5     Strength - Lower Extremities   Hip flexion Knee flexion Knee extension Dorsiflexion Plantarflexion   Right 5/5 5/5 5/5 5/5 5/5   Left 5/5 5/5 5/5 5/5 5/5       REFLEXES   Biceps Triceps Brachioradialis Patellar Achilles   Right +2 +2 +2 +2 +2   Left +2 +2 +2 +2 +2     Toes down bilaterally    SENSORY EXAM  Light touch: intact in all 4 extremities    COORDINATION  Finger to nose: normal  Gait  is slowed, slightly apraxic, reduced arm swing, base, and turning  Romberg negative

## 2024-03-07 NOTE — ASSESSMENT & PLAN NOTE
[facility] Progress Note    Date: 2024    Name: Rina Prajapati    MRN: [id number]         : 1956    Ms. Prajapati arrived in the [facility] today at 0920, in stable condition, here for CYCLE 17, DAY 1, IV TAXOTERE CHEMOTHERAPY REGIMEN (EVERY 21 DAY CYCLE). She was assessed and education was provided.       THE RAMUCIRUMAB HAS BEEN DISCONTINUED.       Ms. Prajapati's vitals were reviewed.  Vitals:    24 0920   BP: (!) 157/88   Pulse: 96   Resp: 28   Temp: 98 °F (36.7 °C)   SpO2: 93%       WEIGHT FROM YESTERDAY WAS  59.3 KG    MOST CURRENT BSA 1.67 (BSA USED FOR CALCULATION 1.71 PER PHARMACY)        Ms. Prajapati presented to the infusion center today stating that she was doing well, and voicing no major complaints.       CURRENT AND SIGNED CHEMOTHERAPY CONSENT WAS VIEWED IN HER ELECTRONIC RECORD.       HER PRE-CHEMO LABS OBTAINED ON YESTERDAY, 3-6-24 WERE ALL REVIEWED, AND WERE ALL NOTED TO BE SATISFACTORY FOR TREATMENT TODAY, AND WERE AS FOLLOWS:       Latest Reference Range & Units 24 09:40   Sodium 136 - 145 mmol/L 140   Potassium 3.5 - 5.5 mmol/L 3.6   Chloride 100 - 111 mmol/L 107   CO2 21 - 32 mmol/L 28   BUN,BUNPL 7.0 - 18 MG/DL 5 (L)   Creatinine 0.6 - 1.3 MG/DL 0.59 (L)   Bun/Cre Ratio 12 - 20   8 (L)   Anion Gap 3.0 - 18 mmol/L 5   Est, Glom Filt Rate >60 ml/min/1.73m2 >60   Glucose, Random 74 - 99 mg/dL 99   CALCIUM, SERUM, 840860 8.5 - 10.1 MG/DL 9.1   ALBUMIN/GLOBULIN RATIO 0.8 - 1.7   1.1   Total Protein 6.4 - 8.2 g/dL 6.0 (L)   Albumin 3.4 - 5.0 g/dL 3.2 (L)   Globulin 2.0 - 4.0 g/dL 2.8   Alk Phos 45 - 117 U/L 68   ALT 13 - 56 U/L 8 (L)   AST 10 - 38 U/L 11   BILIRUBIN TOTAL 0.2 - 1.0 MG/DL 0.4   WBC 4.5 - 13.0 K/uL 6.1   RBC 4.10 - 5.10 M/uL 4.60   Hemoglobin Quant 12.0 - 16.0 g/dL 10.8 (L)   Hematocrit 36 - 48 % 36.4   MCV 78 - 102 FL 79.1   MCH 25.0 - 35.0 PG 23.5 (L)   MCHC 31 - 37 g/dL 29.7 (L)   RDW 11.5 - 14.5 % 20.1 (H)   Platelet Count 140 - 440 K/uL 361   Neutrophils % 40 - 70 % 73  Noted in hx

## 2024-03-12 NOTE — PROGRESS NOTES
"Name: Diego Gunter  MRN: [id number]   CSN: [id number]      Date: 3-13-24      Referring physician:  Quinn Arriola MD  [id number] [address] [place] [id number]    Subjective:      Chief Complaint: ***    History of Present Illness (HPI):    Diego Gunter is a 83 y.o. {Blank single:29004::"right-handed","left-handed","ambidextrous"} male who presents today for an initial evaluation of  and is {Pc accompanied by:5710}.     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), Hard of hearing, colonic polyps, colonic polyps, Hyperlipidemia associated with type 2 diabetes mellitus, Hyperlipidemia LDL goal <100, Hypernatremia (05/05/2022), Hypertension associated with type 2 diabetes mellitus, Hypertension associated with type 2 diabetes mellitus, Hypotension, Intermittent confusion (04/29/2022), Overweight(278.02), Prostate cancer (09/2011), Pulmonary embolism, Reducible right inguinal hernia (10/10/2022), Right hemiparesis (07/22/2022), Type II or unspecified type diabetes mellitus without mention of complication, not stated as uncontrolled, and Urinary retention (04/29/2022).    Social History: The patient  reports that he quit smoking about 61 years ago. His smoking use included cigarettes. He started smoking about 66 years ago. He has a 1.3 pack-year smoking history. He has quit using smokeless tobacco. He reports that he does not currently use alcohol. He reports that he does not use drugs.    Family History: Their family history includes Colon cancer in his mother; Dementia in his brother; Diabetes in his brother, " brother, maternal aunt, mother, sister, and another family member; Heart disease in his father; Lung cancer in his brother; Mental illness in his sister; Myasthenia gravis in his brother; Other in his brother; Parkinsonism in his brother; Pulmonary fibrosis in his brother.    Allergies: Patient has no known allergies.     Meds:   Current Outpatient Medications on File Prior to Visit   Medication Sig Dispense Refill    albuterol (VENTOLIN HFA) 90 mcg/actuation inhaler Inhale 2 puffs into the lungs daily as needed (30 min before arikayce). Rescue (Patient not taking: Reported on 9/18/2023) 18 g 11    ARIKAYCE 590 mg/8.4 mL NbSp       aspirin (ECOTRIN) 81 MG EC tablet Take 81 mg by mouth once daily.      atorvastatin (LIPITOR) 10 MG tablet Take 10 mg by mouth every evening.      atorvastatin (LIPITOR) 40 MG tablet Take 40 mg by mouth every evening.      azelastine (ASTELIN) 137 mcg (0.1 %) nasal spray 2 sprays (274 mcg total) by Nasal route 2 (two) times daily. 30 mL 0    B COMPLEX 1, WITH FOLIC ACID, 0.4 mg Tab Take by mouth.      b complex vitamins capsule Take 1 capsule by mouth once daily.      COMIRNATY 2023-24, 12Y UP,,PF, 30 mcg/0.3 mL inection       donepeziL (ARICEPT) 10 MG tablet TAKE 1 TABLET (10 MG TOTAL) BY MOUTH ONCE DAILY 30 tablet 4    EScitalopram oxalate (LEXAPRO) 10 MG tablet Take 1.5 tablets (15 mg total) by mouth once daily. 145 tablet 0    ethambutoL (MYAMBUTOL) 400 MG Tab Take 2 tablets (800 mg total) by mouth once daily. 60 tablet 5    FLUZONE HIGHDOSE QUAD 23-24  mcg/0.7 mL Syrg       INV clofazimine capsule Take 2 capsules (100 mg total) by mouth once daily with meals. For Investigational Use Only. Patient Study Req ID# [id number]. Protocol: [id number] 200 capsule 0    memantine (NAMENDA) 5 MG Tab Take 1 tablet (5 mg total) by mouth 2 (two) times daily. Initially 5mg daily for 1 week, and then increased to 5mg twice a day. 60 tablet 11    nebulizer and compressor (OMBRA COMPRESSOR  SYSTEM) Marcy use to administer nebulized medication as directed 1 each 0    pantoprazole (PROTONIX) 40 MG tablet TAKE 1 TABLET(40 MG) BY MOUTH EVERY DAY 30 MINUTES BEFORE BREAKFAST 90 tablet 1    pravastatin (PRAVACHOL) 10 MG tablet Take 1 tablet (10 mg total) by mouth every evening. 90 tablet 1    sodium chloride 3% 3 % nebulizer solution USE 4 ML VIAL IN NEBULIZER  TWICE DAILY 480 mL 11    sodium chloride 7% 7 % nebulizer solution use one vial in nebulizer twice a day 500 mL 11    [DISCONTINUED] omega-3 fatty acids 1,000 mg Cap Take 2 g by mouth.       No current facility-administered medications on file prior to visit.       Objective:     Physical Exam:    There were no vitals filed for this visit.  There is no height or weight on file to calculate BMI.    Constitutional  Well-developed, well-nourished, appears stated age   Ophthalmoscopic  No papilledema with no hemorrhages or exudates bilaterally   Cardiovascular  Radial pulses 2+ and symmetric, no LE edema bilaterally     ***    Laboratory Results:  Hospital Outpatient Visit on 02/21/2024   Component Date Value Ref Range Status    QRS Duration 02/21/2024 90  ms Final    [facility] QTC Calculation 02/21/2024 405  ms Final   Lab Visit on 02/21/2024   Component Date Value Ref Range Status    Sodium 02/21/2024 141  136 - 145 mmol/L Final    Potassium 02/21/2024 4.5  3.5 - 5.1 mmol/L Final    Chloride 02/21/2024 105  95 - 110 mmol/L Final    CO2 02/21/2024 25  23 - 29 mmol/L Final    Glucose 02/21/2024 87  70 - 110 mg/dL Final    BUN 02/21/2024 10  8 - 23 mg/dL Final    Creatinine 02/21/2024 1.1  0.5 - 1.4 mg/dL Final    Calcium 02/21/2024 9.4  8.7 - 10.5 mg/dL Final    Total Protein 02/21/2024 7.6  6.0 - 8.4 g/dL Final    Albumin 02/21/2024 4.2  3.5 - 5.2 g/dL Final    Total Bilirubin 02/21/2024 0.5  0.1 - 1.0 mg/dL Final    Alkaline Phosphatase 02/21/2024 87  55 - 135 U/L Final    AST 02/21/2024 36  10 - 40 U/L Final    ALT 02/21/2024 36  10 - 44 U/L Final    eGFR  02/21/2024 >60  >60 mL/min/1.73 m^2 Final    Anion Gap 02/21/2024 11  8 - 16 mmol/L Final   Patient Outreach on 02/08/2024   Component Date Value Ref Range Status    Left Eye DM Retinopathy 01/30/2024 Negative   Final    Right Eye DM Retinopathy 01/30/2024 Negative   Final           Imaging:   Results for orders placed or performed during the hospital encounter of 12/16/23   MRI Brain Without Contrast    Narrative    EXAMINATION:  MRI BRAIN WITHOUT CONTRAST    CLINICAL HISTORY:  Memory loss;Thin cuts please;Other amnesia    TECHNIQUE:  Multiplanar multisequence MR imaging of the brain was performed without intravenous contrast.    COMPARISON:  05/08/2022    FINDINGS:  Intracranial Compartment:    Stable pattern of cerebral volume loss.  No hydrocephalus.    Persistent patchy T2/FLAIR hyperintensity in the supratentorial white matter, nonspecific but most likely reflecting chronic small vessel ischemic changes. No new major vascular distribution infarct. No recent or remote hemorrhage.  No mass effect or midline shift.    Small arachnoid cyst over the posterior left frontal lobe near the vertex.  No new extra-axial blood or fluid collections.    Normal vascular flow voids are preserved.    Skull/Extracranial Contents (limited evaluation):    Calvarial marrow signal intensity is unremarkable.    Postsurgical change partially visualized cervical spine.  Small effusion at left atlantooccipital articulation.    Bilateral pseudophakia.      Impression    No compelling evidence of acute intracranial pathology.    Chronic small vessel ischemic change and cerebral volume loss, similar to prior exam.      Electronically signed by: North Wilson MD  Date:    12/16/2023  Time:    10:40   Results for orders placed or performed during the hospital encounter of 11/24/23   CT Head Without Contrast    Narrative    EXAMINATION:  CT HEAD WITHOUT CONTRAST    CLINICAL HISTORY:  Mental status change, unknown cause;    TECHNIQUE:  Low  dose axial CT images obtained throughout the head without the use of intravenous contrast.  Axial, sagittal and coronal reconstructions were performed.    COMPARISON:  MRI 05/08/2022, CT 05/03/2022    FINDINGS:  INTRACRANIAL COMPARTMENT:    Prominence of the ventricles and sulci compatible with generalized cerebral volume loss.  Similar prominent extra-axial space overlying the left posterior frontal/parietal lobe at the vertex.  No hydrocephalus.    Mild patchy hypoattenuation in the supratentorial white matter, nonspecific but may reflect mild chronic microvascular ischemic changes.   No parenchymal mass effect, hemorrhage, edema or major vascular distribution infarct.    No extra-axial blood or fluid collections.    SKULL/EXTRACRANIAL CONTENTS (limited evaluation):    No fracture.  Mastoid air cells and paranasal sinuses are essentially clear.      Impression    No evidence of acute intracranial pathology.    Electronically signed by resident: Dayton Tran  Date:    11/24/2023  Time:    10:19    Electronically signed by: Fabio David  Date:    11/24/2023  Time:    11:05   Results for orders placed or performed during the hospital encounter of 04/28/22   MRI Brain W WO Contrast    Narrative    EXAMINATION:  MRI BRAIN W WO CONTRAST    CLINICAL HISTORY:  Mental status change, unknown cause;worsening hyperactive delirium and expressive aphasia, eval stroke vs other;.    TECHNIQUE:  Multiplanar multisequence MR imaging of the brain was performed before and after the administration of 8 mL Gadavist  intravenous contrast.    COMPARISON:  MRI brain 04/28/2022    FINDINGS:  There is no abnormal restricted diffusion to suggest acute infarction.  There is generalized cerebral volume loss with compensatory prominence of cerebral sulci, the ventricular system and extra-axial spaces.  There is redemonstration of a more focal/prominent extra-axial space overlying the left posterior frontal/parietal lobe at the vertex,  unchanged from prior exam of 03/26/2022 and 04/28/2022.  There is T2/FLAIR hyperintensity in the supratentorial white matter which is appears similar in distribution and extent to prior studies.  This demonstrates no corresponding restricted diffusion or postcontrast enhancement.  While nonspecific, findings likely reflect sequela of chronic microvascular ischemic change.  Following the administration of IV contrast, no pathologic foci of enhancement are identified.  No abnormal parenchymal susceptibility suggest acute parenchymal hemorrhage.  No extra-axial hemorrhage or fluid collections.  Craniocervical junction and sellar region are within  normal limits.      Impression    1. No acute intracranial abnormality identified on today's exam.  Specifically, no evidence of acute infarct or enhancing intracranial lesion.  2. Generalized cerebral volume loss and findings suggestive of chronic microvascular ischemic change, similar to prior study the study.      Electronically signed by: Jennifer Boston MD  Date:    05/09/2022  Time:    05:18     *Note: Due to a large number of results and/or encounters for the requested time period, some results have not been displayed. A complete set of results can be found in Results Review.       Independent review of images: ***    Assessment and Plan     There are no diagnoses linked to this encounter.    Medical Decision Making:    No follow-ups on file.    I spent *** minutes face-to-face with the patient with >50% of the time spent with counseling and education regarding:  - results of data, diagnosis, and recommendations stated above  - the prognosis of a ***  - risks and benefits of ***  - importance of diet and exercise    Rubi Dixon, KERRY, NP-C  Division of Movement and Memory Disorders  [facility]  [phone]

## 2024-03-12 NOTE — PROGRESS NOTES
NEUROPSYCHOLOGY CONSULT  Referral Information  Name: Diego Gunter  MRN: [id number]  : 1940  Age: 83 y.o.  Race: White  Gender: male  REFERRAL SOURCE: Quinn Arriola MD  DATE CONDUCTED: 3/13/2024  SOURCES OF INFORMATION:  The following was gathered from a clinical interview with Mr. Diego Gunter, his grandson Jayesh, his wife (who accompanied him to the feedback session), and review of the available medical records. Mr. Gunter expressed an understanding of the purpose of the evaluation and consented to all procedures. Total licensed billing psychologists professional time including clinical interview, test administration and interpretation of tests administered by the billing psychologist, integration of test results and other clinical data, preparing the final report, and personally reporting results to the patient   [place]illin - 60 minutes, [id number] - 120 minutes, [id number] - 105 minutes    NEUROPSYCHOLOGICAL EVALUATION - CONFIDENTIAL    SUMMARY/TREATMENT PLAN   Mr. Gunter is an 83 year old male with self and family reported cognitive changes (primarily expressive language)  over the past 5 years. His functioning has declined over the past several years, which coincides with multiple health issues. Notably, he was hospitalized in 2023 after suddenly developing command hallucinations. He was started on Aricept and Namenda while hospitalized, with no similar issues since discharge (10 days). While this episode was the most striking episode of neuropsychiatric symptoms, his wife indicated that he has seen shadows that he interprets as people (specifically family members) for the past several years. He receives support in all complex ADLs.    Neuropsychological testing and current functioning are at the level of dementia, mild severity. 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 A primary progressive aphasia (PPA) syndrome may explain his expressive language changes, which can be due to multiple etiologies, including DLB. He does not present with the sky forgetting/amnesia seen in clinical Alzheimer's disease. Several recommendations are offered.     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 Recommend that family continue to provide oversight in all complex ADLs.     Neuropsychiatric Symptoms: Continue to monitor for hallucinations/delusions.     Speech Therapy: Referral placed for word finding difficulty.     Driving: Continue to abstain from driving.     Follow-up: Repeat testing in 2 years.          Relevant Orders    Ambulatory referral/consult to Speech Therapy     Other Visit Diagnoses       Word finding difficulty        Relevant Orders    Ambulatory referral/consult to Speech Therapy           Mr. Gunter will be provided the results of the evaluation.     Thank you for allowing me to participate in Mr. Sifuentes care.  If you have any questions, please contact me at [phone].    Chencho Solis Psy.D., ABPP  Board Certified in Clinical Neuropsychology  Department of Neurology    HISTORY OF PRESENT ILLNESS: Mr. Diego Gunter is an 83 y.o., right-handed, male with 9 years of education who was referred for a neuropsychological evaluation in the setting of dysphagia, dysarthria, and cognitive changes. Dysphagia and dysarthria date back to around 2014 with no clear etiology, establishing care with [facility] in 2016. No apparent indication of a movement or neuromuscular disorder to this day per neurology notes. 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 He was subsequently referred for neuropsychological assessment, but no testing was completed due to the pandemic.     4/2020 HPI (Irma): referred for a neuropsychological evaluation in the setting of cognitive decline. Mr. Gunter wife, Addis, first noticed changes a few years ago, primarily related to language/communication. She noted that he has never been "a talker," describing him as a shy individual who has never initiated conversations. However, she now finds that he has trouble expressing himself in conversation. She can't tell if he has trouble thinking about what he wants to say or if it's an issue of word finding. He also has a tendency to misplace items, such as his phone or keys. He is able to backtrack and find the items, but it takes time. Addis also typically reviews with him the items he needs before he leaves the house. Otherwise, she denied any changes in his daily functioning.      Mr. Gunter acknowledged being worried about dementia. He reported word finding difficulty and notices that he has to stop and think more frequently when in conversation. His report was consistent with his wife's as he also noted a recent instance when he could not find his phone. He was able to remember this event during the interview, including where the phone was. He can forget names of acquaintances.      4/2020 NEUROPSYCH IMPRESSIONS (Irma): Mr. Gunter will benefit from undergoing comprehensive neuropsychological assessment. At this point, it is encouraging that he remains functionally independent with few reported problems. He also demonstrated intact episodic memory for recent events. His history is also not suggestive of the type of sky forgetting seen in Alzheimer's disease. 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 Assessment of language will be especially important when he returns to clinic, " "although his speech was fluent with minimal word finding difficulties during this interview. His history is most consistent with cerebrovascular disease superimposed on lower baseline abilities.     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 He then contracted COVID in 1/2021. Following discharge, he had PE. After that, he had compressive epidural hematoma in the cervical for which he had decompression of epidural hematoma and posterior fusion."   -Psychiatric hospitalization in 11/2023. Per 11/24/2023 ED Note: "brought in by wife secondary to concerns for homicidal ideations. Wife not at bedside on initial evaluation. Patient reports that he woke up this morning and was experiencing command hallucinations that were telling him to kill his wife. He reports he is never had these before. He was very concerned so he left the bedroom and turned on the television to watch a Jain mass. He said the command hallucinations then returned. He is also been told to consider jumping off a bridge."   -Mr. Gunter recalled this event during the clinical interview, noting that the thoughts/voices were very upsetting and ego dystonic. He remembers calling his wife to tell her what was happening and to stay away from him. She went to the kitchen, they both cried, and she took him to the ED, where he was transferred to [facility] shortly thereafter.   -He was admitted for about 10 days and was reportedly started on Aricept and Namenda.   -He has not had a similar episode since that time.   -He was accompanied to this visit by his grandson, Jayesh, who frequently accompanies him to appointments. Mr. Gunter's wife normally comes as well, but she was sick.  -Jayesh reported improved cognitive clarity and speech since starting the medication, but continues to observe word finding difficulty.   -Mr. Gunter reported progressive cognitive decline " since the 2020 appointment, noting that he has trouble completing sentences, which he finds aggravating. He endorsed word finding difficulty.   -He has not noticed much forgetfulness.      Neuropsychiatric Symptoms:  Hallucinations: Mr. Gunter denied, with the exception of an apparent auditory hallucination in 11/2023. However, his wife noted that he has seen shadows that he interprets as people for the past several years. He will say that it is family members visiting him.   Delusional/Paranoid Thinking: Denied  Depression/Labile Mood: Denied, actually feels that his mood is improving. He notes that he goes to eat lunch with friends every Wednesday. He denied active SI, plan, or intent.   Anxiety: Denied    DAILY FUNCTIONING:  BASIC ADLS:  Feeding: independent, no anosmia. Weight is stable.   Dressing: independent  Bathing: independent, says he cautious in the shower since PE 2 years due to reduced right side mobility. Uses a shower chair.     IADLS:  Support System: Resides with wife. Grandlaamr Mcconnell is next door.   Appointment Management: dependent, wife manages.   Medication Compliance: dependent, wife manages.   Financial Management: dependent, wife manages.   Cooking: No issues when he does cook.   Driving: He really hasn't driven since the PE. Tried once, but it did not go well.     BRAIN HEALTH RISK FACTORS:  Hearing Loss: Endorsed, bilateral hearing aides.   Falls: Endorsed, a few months when turning around quickly. He couldn't get up, but no injuries.   Sleep: He goes to sleep around 10pm, but it takes him 30-60 minutes to fall asleep. He wakes up around 4am and is up and down after that. He gets out of bed around 7am. Jayesh noted that this is typical as he would wake up at 4:30 for work. He doesn't take anything for sleep. He will doze off if he sits long enough during the day. He reports vivid dreams. His wife has not noticed thrashing in his sleep, but indicated that he will have whole conversations.  "    MEDICAL HISTORY: Mr. Gunter  has a past medical history of Acute encephalopathy, Anemia, Antiphospholipid syndrome (2021), Carpal tunnel syndrome on right (01/10/2023), Centrilobular emphysema, COPD (chronic obstructive pulmonary disease), Cubital tunnel syndrome on right (01/10/2023), Dysphagia, Dysphagia, oropharyngeal (2016), Functional belching disorder (2021), Hard of hearing, colonic polyps, colonic polyps, Hyperlipidemia associated with type 2 diabetes mellitus, Hyperlipidemia LDL goal <100, Hypernatremia (2022), Hypertension associated with type 2 diabetes mellitus, Hypertension associated with type 2 diabetes mellitus, Hypotension, Intermittent confusion (2022), Overweight(278.02), Prostate cancer (2011), Pulmonary embolism, Reducible right inguinal hernia (10/10/2022), Right hemiparesis (2022), Type II or unspecified type diabetes mellitus without mention of complication, not stated as uncontrolled, and Urinary retention (2022).    NEUROIMAGIN2023 Brain MRI: "No compelling evidence of acute intracranial pathology. Chronic small vessel ischemic change and cerebral volume loss, similar to prior exam."    SUBSTANCE USE: Mr. Gunter  reports that he quit smoking cigarettes over 50 years ago. No alcohol for the past 10 years. He denied a history of substance abuse.      CURRENT MEDICATIONS:    Current Outpatient Medications:     ARIKAYCE 590 mg/8.4 mL NbSp, , Disp: , Rfl:     aspirin (ECOTRIN) 81 MG EC tablet, Take 81 mg by mouth once daily., Disp: , Rfl:     atorvastatin (LIPITOR) 10 MG tablet, Take 10 mg by mouth every evening., Disp: , Rfl:     atorvastatin (LIPITOR) 40 MG tablet, Take 40 mg by mouth every evening., Disp: , Rfl:     b complex vitamins capsule, Take 1 capsule by mouth once daily., Disp: , Rfl:     donepeziL (ARICEPT) 10 MG tablet, TAKE 1 TABLET (10 MG TOTAL) BY MOUTH ONCE DAILY, Disp: 30 tablet, Rfl: 4    FLUZONE HIGHDOSE QUAD 23-24 "  mcg/0.7 mL Syrg, , Disp: , Rfl:     INV clofazimine capsule, Take 2 capsules (100 mg total) by mouth once daily with meals. For Investigational Use Only. Patient Study Req ID# [id number]. Protocol: [id number], Disp: 200 capsule, Rfl: 0    nebulizer and compressor (OMBRA COMPRESSOR SYSTEM) Marcy, use to administer nebulized medication as directed, Disp: 1 each, Rfl: 0    pravastatin (PRAVACHOL) 10 MG tablet, Take 1 tablet (10 mg total) by mouth every evening., Disp: 90 tablet, Rfl: 1    albuterol (VENTOLIN HFA) 90 mcg/actuation inhaler, Inhale 2 puffs into the lungs daily as needed (30 min before arikayce). Rescue, Disp: 18 g, Rfl: 11    azelastine (ASTELIN) 137 mcg (0.1 %) nasal spray, 2 sprays (274 mcg total) by Nasal route 2 (two) times daily. (Patient not taking: Reported on 3/13/2024), Disp: 30 mL, Rfl: 0    COMIRNATY 2023-24, 12Y UP,,PF, 30 mcg/0.3 mL inection, , Disp: , Rfl:     EScitalopram oxalate (LEXAPRO) 10 MG tablet, Take 1.5 tablets (15 mg total) by mouth once daily., Disp: 145 tablet, Rfl: 1    ethambutoL (MYAMBUTOL) 400 MG Tab, Take 2 tablets (800 mg total) by mouth once daily., Disp: 60 tablet, Rfl: 5    memantine (NAMENDA) 5 MG Tab, Take 1 tablet (5 mg total) by mouth 2 (two) times daily. Initially 5mg daily for 1 week, and then increased to 5mg twice a day., Disp: 60 tablet, Rfl: 11    pantoprazole (PROTONIX) 40 MG tablet, TAKE 1 TABLET(40 MG) BY MOUTH EVERY DAY 30 MINUTES BEFORE BREAKFAST, Disp: 90 tablet, Rfl: 1    sodium chloride 3% 3 % nebulizer solution, USE 4 ML VIAL IN NEBULIZER  TWICE DAILY (Patient not taking: Reported on 3/13/2024), Disp: 480 mL, Rfl: 11    sodium chloride 7% 7 % nebulizer solution, use one vial in nebulizer twice a day, Disp: 500 mL, Rfl: 11     FAMILY HISTORY: family history includes Colon cancer in his mother; Dementia in his brother; Diabetes in his brother, brother, maternal aunt, mother, sister, and another family member; Heart disease in his father; Lung  cancer in his brother; Mental illness in his sister; Myasthenia gravis in his brother; Other in his brother; Parkinsonism in his brother; Pulmonary fibrosis in his brother.    PSYCHOSOCIAL HISTORY:   Education:              Level Attained: Completed 9 years of education.               Learning Difficulties: Denied              Special Education: Denied              Repeated Grade: Endorsed. He denied problems with learning, but noted that he repeated 4th and 5th grade.                 Vocation:              Highest Attained: Worked in [employer]. Most recently worked as a  at a  shop. 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               Retired: 2014     Relationship Status:              : yes, 1960              Children: 2 sons.     MENTAL STATUS AND OBSERVATIONS:  APPEARANCE: Appropriately dressed/groomed.   ALERTNESS/ORIENTATION: Attentive and alert. He seemed to demonstrate intact episodic memory for recent events, consistent with the previous evaluation.   GAIT/MOTOR: Ambulated independently.   SENSORY: Bilateral hearing aides.   SPEECH/LANGUAGE: Mild dysarthria. Word finding difficulties with apparent phonemic errors/neologisms. Minimal nouns in spontaneous conversation.   STATED MOOD/AFFECT: Mood was euthymic.   INTERPERSONAL BEHAVIOR: Rapport was quickly and easily established   THOUGHT PROCESSES: Thoughts seemed logical and goal-directed.    BEHAVIORAL OBSERVATIONS: Embedded PVT was WNL.  He required frequent repetition and clarification of test instructions, to the extent that some tests were difficult to complete.  Scores appear to be a valid and reliable measure of his current cognitive abilities.     APPENDIX/TEST RESULTS:  TESTS ADMINISTERED:  Clinical Interview and Review of Records, Oleksandr Cognitive Assessment (MoCA), Repeatable Battery for the Assessment of Neuropsychological Status (RBANS), Bhanu Copy, NAB Naming, and the Geriatric  Depression Scale (GDS).      Score Label T-Score Standard Score Z-Score Scaled Score %ile Rank   Exceptionally High > 70 > 130 > 2.0  > 16 > 98   Above Average 64-69 120-129 1.4-1.9 15 91-97   High Average 57-63 110-119 0.7-1.3 12-14 75-90   Average 44-56  0.6 to -0.6 8-11 25-74   Low Average 37-43 80-89 -1.3 to -0.7 6-7 9-24   Below Average 30-36 70-79 -2.0 to -1.4 4-5 2-8   Exceptionally Low < 30 < 70  < -2.0 < 4 < 2      Mental Status:  He was oriented to the month day of the week.  He was 1 off the exact date and stated the year was 224 rather than 2024.  Fully oriented to location.    3/2024 MoCA = 15/30     Pre-morbid/Baseline: Estimated to be in the low-average range based on educational and vocational attainment.  A composite score of his overall cognitive abilities was in the exceptionally low range.     Language:  Letter verbal fluency was within normal limits. Below average semantic verbal fluency. Exceptionally low confrontation naming. Remarkable for multiple semantic paraphasic errors with limited benefit from cueing.      Visuospatial: Low average performance on a test of line orientation. Exceptionally low performance on his copy of a complex figure with limited appreciation for the gestalt of the figure. His copy was suggestive of mild to moderate visuospatial dysfunction.      Learning/Memory: Overall encoding of a supraspan word list was exceptionally low as he recalled 0, 3, 4, and 5 of 10 words.  He did not freely recall any words following a long delay.  Recognition was below average as he correctly identified 7 of 10 words with 1 false-positive error  He encoded 2 of 5 words after 2 trials on the Wilkin, freely recalling 0 words following a brief delay.  He recalled 1 word with categorical cuing and 3 of 4 words with multiple choice cuing.  Overall encoding of a short story was below average.  He retained 4 of 8 previously encoded details following a long delay.  His overall recall was low  average.  He did not recall any details from a previously copied figure.     Executive Functioning: One trial learning/encoding was consistently below expectation.  He provided 1 correct response on a serial 7 subtraction task. Exceptionally low performance on a processing speed task.      Mood: He did not endorse clinical depression, but did report cognitive decline.       Raw Score Type of Standardized Score Standardized Score Percentile/CP   RBANS EI 0 - -    COGNITIVE SCREENING Raw Score Type of Standardized Score Standardized Score Percentile/CP   MoCA 15 - - -   Orientation - Place 2/2 - - -   Orientation - Date 2/4 - - -   RBANS       Immediate Memory - SS 61 0.5   VS/Construction - SS 78 7   Language - SS 86 18   Attention - SS 60 0.4   Delayed Memory - SS 56 0.2   Total Scale - SS 60 0.4   Subtests       List Learning 12 ss 2 0   Story Memory 10 ss 5 5   Figure Copy 15 ss 6 9   Line Orientation 14 ss - 17-25   Naming 9 ss - 26-50   Fluency 9 ss 4 2   Digit Span 7 ss 6 9   Coding 13 ss 2 0   List Recall 0 ss - 3-9   List Recognition 16 ss - 3-9   Story Recall 4 ss 6 9   Figure Recall 0 ss 1 0   Story Recognition  11 ss - 68-86   Figure Recognition 0 - - -   LANGUAGE FUNCTIONING Raw Score Type of Standardized Score Standardized Score Percentile/CP   RBANS Naming 9 ss - 26-50   RBANS Semantic Fluency 9 ss 4 2   NAB Naming 21 Tscore 27 1   NAB Naming Percent Correct After Semantic Cuing 0 - - N/A   NAB Naming Percent Correct After Phonemic Cuing 20 - - N/A   VISUOSPATIAL FUNCTIONING Raw Score Type of Standardized Score Standardized Score Percentile/CP   RBANS Line Orientation  14 ss - 17-25   RBANS Figure Copy 15 ss 6 9   RCFT Copy 10 - - <1   RCFT Time to Copy 504 - - 11-16   LEARNING & MEMORY Raw Score Type of Standardized Score Standardized Score Percentile/CP   RBANS       Immediate Memory - SS 61 0.5   Delayed Memory - SS 56 0.2   List Learning 12 ss 2 0   List Recall 0 ss - 3-9   List Recognition 16 ss -  3-9   Story Memory 10 ss 5 5   Story Recall 4 ss 6 9   Story Recognition  11 - - 68-86   Figure Recall 0 ss 1 0.1   Figure Recognition 0 - - -   ATTENTION/WORKING MEMORY Raw Score Type of Standardized Score Standardized Score Percentile/CP   RBANS Digit Span  7 ss 6 9   MENTAL PROCESSING SPEED Raw Score Type of Standardized Score Standardized Score Percentile/CP   RBANS Coding 13 ss 2 0   MOOD & PERSONALITY Raw Score Type of Standardized Score Standardized Score Percentile/CP   GDS-30 7 - - -

## 2024-03-13 ENCOUNTER — OFFICE VISIT (OUTPATIENT)
Dept: NEUROLOGY | Facility: CLINIC | Age: 84
End: 2024-03-13
Payer: MEDICARE

## 2024-03-13 VITALS
DIASTOLIC BLOOD PRESSURE: 64 MMHG | SYSTOLIC BLOOD PRESSURE: 99 MMHG | WEIGHT: 162.13 LBS | HEART RATE: 60 BPM | BODY MASS INDEX: 24.65 KG/M2

## 2024-03-13 DIAGNOSIS — R47.89 WORD FINDING DIFFICULTY: ICD-10-CM

## 2024-03-13 DIAGNOSIS — F03.92 NEURODEGENERATIVE DEMENTIA WITH PSYCHOTIC DISTURBANCE: Primary | ICD-10-CM

## 2024-03-13 PROCEDURE — 96133 NRPSYC TST EVAL PHYS/QHP EA: CPT | Mod: S$GLB,,, | Performed by: PSYCHIATRY & NEUROLOGY

## 2024-03-13 PROCEDURE — 96138 PSYCL/NRPSYC TECH 1ST: CPT | Mod: S$GLB,,, | Performed by: PSYCHIATRY & NEUROLOGY

## 2024-03-13 PROCEDURE — 96139 PSYCL/NRPSYC TST TECH EA: CPT | Mod: S$GLB,,, | Performed by: PSYCHIATRY & NEUROLOGY

## 2024-03-13 PROCEDURE — 96116 NUBHVL XM PHYS/QHP 1ST HR: CPT | Mod: S$GLB,,, | Performed by: PSYCHIATRY & NEUROLOGY

## 2024-03-13 PROCEDURE — 99999 PR PBB SHADOW E&M-EST. PATIENT-LVL IV: CPT | Mod: PBBFAC,,,

## 2024-03-13 PROCEDURE — 99499 UNLISTED E&M SERVICE: CPT | Mod: S$GLB,,, | Performed by: PSYCHIATRY & NEUROLOGY

## 2024-03-13 PROCEDURE — 96132 NRPSYC TST EVAL PHYS/QHP 1ST: CPT | Mod: S$GLB,,, | Performed by: PSYCHIATRY & NEUROLOGY

## 2024-03-17 DIAGNOSIS — F41.9 ANXIETY: ICD-10-CM

## 2024-03-17 RX ORDER — ETHAMBUTOL HYDROCHLORIDE 400 MG/1
800 TABLET, FILM COATED ORAL DAILY
Qty: 60 TABLET | Refills: 5 | Status: SHIPPED | OUTPATIENT
Start: 2024-03-17 | End: 2024-04-28 | Stop reason: SDUPTHER

## 2024-03-17 NOTE — TELEPHONE ENCOUNTER
Care Due:                  Date            Visit Type   Department     Provider  --------------------------------------------------------------------------------                                             [facility]/ INTERNAL  Tamara Artis  Last Visit: 12-      FOLLOW UP    MED/ PEDS      Vasile LI -         [facility]/ INTERNAL  [facility]  Next Visit: 08-      CARE ([facility])   MED/ PEDS      Vasile Ferreira                                                            Last  Test          Frequency    Reason                     Performed    Due Date  --------------------------------------------------------------------------------    Lipid Panel.  12 months..  pravastatin..............  03- 03-    [facility] Embedded Care Due Messages. Reference number: [id number].   3/17/2024 3:09:23 PM CDT

## 2024-03-18 RX ORDER — ESCITALOPRAM OXALATE 10 MG/1
15 TABLET ORAL DAILY
Qty: 145 TABLET | Refills: 1 | Status: SHIPPED | OUTPATIENT
Start: 2024-03-18

## 2024-03-18 RX ORDER — SODIUM CHLORIDE FOR INHALATION 7 %
VIAL, NEBULIZER (ML) INHALATION
Qty: 500 ML | Refills: 11 | Status: SHIPPED | OUTPATIENT
Start: 2024-03-18

## 2024-03-20 ENCOUNTER — LAB VISIT (OUTPATIENT)
Dept: LAB | Facility: HOSPITAL | Age: 84
End: 2024-03-20
Attending: INTERNAL MEDICINE
Payer: MEDICARE

## 2024-03-20 DIAGNOSIS — R73.03 PREDIABETES: ICD-10-CM

## 2024-03-20 DIAGNOSIS — E78.49 OTHER HYPERLIPIDEMIA: ICD-10-CM

## 2024-03-20 PROBLEM — F03.92: Status: ACTIVE | Noted: 2020-04-09

## 2024-03-20 LAB
CHOLEST SERPL-MCNC: 131 MG/DL (ref 120–199)
CHOLEST/HDLC SERPL: 3.4 {RATIO} (ref 2–5)
ESTIMATED AVG GLUCOSE: 111 MG/DL (ref 68–131)
HBA1C MFR BLD: 5.5 % (ref 4–5.6)
HDLC SERPL-MCNC: 38 MG/DL (ref 40–75)
HDLC SERPL: 29 % (ref 20–50)
LDLC SERPL CALC-MCNC: 73 MG/DL (ref 63–159)
NONHDLC SERPL-MCNC: 93 MG/DL
TRIGL SERPL-MCNC: 100 MG/DL (ref 30–150)

## 2024-03-20 PROCEDURE — 83036 HEMOGLOBIN GLYCOSYLATED A1C: CPT | Performed by: INTERNAL MEDICINE

## 2024-03-20 PROCEDURE — 36415 COLL VENOUS BLD VENIPUNCTURE: CPT | Mod: PO | Performed by: INTERNAL MEDICINE

## 2024-03-20 PROCEDURE — 80061 LIPID PANEL: CPT | Performed by: INTERNAL MEDICINE

## 2024-03-20 NOTE — ASSESSMENT & PLAN NOTE
Level of Care: Appropriately managed. Recommend that family continue to provide oversight in all complex ADLs.     Neuropsychiatric Symptoms: Continue to monitor for hallucinations/delusions.     Speech Therapy: Referral placed for word finding difficulty.     Driving: Continue to abstain from driving.     Follow-up: Repeat testing in 2 years.

## 2024-03-22 ENCOUNTER — TELEPHONE (OUTPATIENT)
Dept: OTOLARYNGOLOGY | Facility: CLINIC | Age: 84
End: 2024-03-22
Payer: MEDICARE

## 2024-03-25 RX ORDER — ALBUTEROL SULFATE 90 UG/1
2 AEROSOL, METERED RESPIRATORY (INHALATION) DAILY PRN
Qty: 18 G | Refills: 11 | Status: SHIPPED | OUTPATIENT
Start: 2024-03-25 | End: 2024-04-12

## 2024-03-25 RX ORDER — ALBUTEROL SULFATE 90 UG/1
2 AEROSOL, METERED RESPIRATORY (INHALATION) DAILY PRN
Qty: 18 G | Refills: 11 | OUTPATIENT
Start: 2024-03-25 | End: 2024-04-24

## 2024-03-25 NOTE — TELEPHONE ENCOUNTER
----- Message from Jane Mcgregor sent at 3/25/2024 12:24 PM CDT -----  Regarding: Pharm Auth  Contact: pt [phone]  Pt calling to request prescription for albuterol (VENTOLIN HFA) 90 mcg/actuation inhaler   to be sent to       [id number] - [address] [facility]  [address] [place] [id number]  Phone: [phone] Fax: [phone]

## 2024-03-25 NOTE — PROGRESS NOTES
I have seen the patient, reviewed the Resident's history and physical, assessment, and plan. I have personally interviewed and examined the patient at bedside and agree with the findings.       Lance Hooks MD  Neurology  [facility] - Neurology 7th Fl

## 2024-03-26 ENCOUNTER — OFFICE VISIT (OUTPATIENT)
Dept: NEUROLOGY | Facility: CLINIC | Age: 84
End: 2024-03-26
Payer: MEDICARE

## 2024-03-26 DIAGNOSIS — F03.92 NEURODEGENERATIVE DEMENTIA WITH PSYCHOTIC DISTURBANCE: Primary | ICD-10-CM

## 2024-03-26 PROCEDURE — 99499 UNLISTED E&M SERVICE: CPT | Mod: S$GLB,,, | Performed by: PSYCHIATRY & NEUROLOGY

## 2024-03-26 NOTE — PROGRESS NOTES
NEUROPSYCHOLOGICAL EVALUATION - CONFIDENTIAL  FEEDBACK NOTE    On 3/26/2024, I provided Mr. Diego Gunter and his wife the neuropsychological evaluation results. Please see the full report for a comprehensive overview of the findings.     Chencho Solis Psy.D., JOSHUAP  Board Certified in Clinical Neuropsychology  [facility] - Department of Neurology

## 2024-03-27 ENCOUNTER — OFFICE VISIT (OUTPATIENT)
Dept: UROLOGY | Facility: CLINIC | Age: 84
End: 2024-03-27
Payer: MEDICARE

## 2024-03-27 VITALS
SYSTOLIC BLOOD PRESSURE: 114 MMHG | WEIGHT: 162.06 LBS | HEART RATE: 53 BPM | BODY MASS INDEX: 24.56 KG/M2 | DIASTOLIC BLOOD PRESSURE: 68 MMHG | HEIGHT: 68 IN

## 2024-03-27 DIAGNOSIS — R41.89 MODERATE COGNITIVE IMPAIRMENT: ICD-10-CM

## 2024-03-27 DIAGNOSIS — Z90.79 HISTORY OF RADICAL PROSTATECTOMY: ICD-10-CM

## 2024-03-27 DIAGNOSIS — R33.9 INCOMPLETE EMPTYING OF BLADDER: Primary | ICD-10-CM

## 2024-03-27 DIAGNOSIS — Z87.898 HISTORY OF URINARY RETENTION: ICD-10-CM

## 2024-03-27 DIAGNOSIS — C61 PROSTATE CANCER: ICD-10-CM

## 2024-03-27 LAB
BILIRUB SERPL-MCNC: NORMAL MG/DL
BLOOD URINE, POC: NORMAL
CLARITY, POC UA: CLEAR
COLOR, POC UA: YELLOW
GLUCOSE UR QL STRIP: NORMAL
KETONES UR QL STRIP: NORMAL
LEUKOCYTE ESTERASE URINE, POC: NORMAL
NITRITE, POC UA: NORMAL
PH, POC UA: 6
POC RESIDUAL URINE VOLUME: 170 ML (ref 0–100)
PROTEIN, POC: NORMAL
SPECIFIC GRAVITY, POC UA: 1
UROBILINOGEN, POC UA: NORMAL

## 2024-03-27 PROCEDURE — 99999 PR PBB SHADOW E&M-EST. PATIENT-LVL IV: CPT | Mod: PBBFAC,,, | Performed by: NURSE PRACTITIONER

## 2024-03-27 PROCEDURE — 99214 OFFICE O/P EST MOD 30 MIN: CPT | Mod: S$GLB,,, | Performed by: NURSE PRACTITIONER

## 2024-03-27 PROCEDURE — 51798 US URINE CAPACITY MEASURE: CPT | Mod: S$GLB,,, | Performed by: NURSE PRACTITIONER

## 2024-03-27 PROCEDURE — 81002 URINALYSIS NONAUTO W/O SCOPE: CPT | Mod: S$GLB,,, | Performed by: NURSE PRACTITIONER

## 2024-03-27 NOTE — PROGRESS NOTES
CHIEF COMPLAINT:    Diego Gunter is a 83 y.o. male presents today     HISTORY OF PRESENTING ILLINESS:    Diego Gunter is a 82 y.o.Type 2 DIABETIC male with a history of Urinary Retention following spinal surgery 04/28/2022.   Last successful VT was 08/05/2022; see HPI.     PMH antiphospholipid syndrome on chronic coumadin for left PE, DM2, prostate ca s/p prostatectomy 2011, MAC pneumonia dx Fall 2018, mild cognitive impairment, and HLD           Lab Results   Component Value Date     PSA 0.02 10/28/2015     PSADIAG 0.02 06/08/2022         Last clinic visit was 09/07/2023  Had a PVR of 340; he urinated after reading; not recorded. He declined CIC/amaya after discussion.     Here today for f/u visit.  Voices no complaints.   Pleased with urination.nocturia 0.  Controlling his constipation with OTC meds                         REVIEW OF SYSTEMS:  Review of Systems   Constitutional: Negative.  Negative for chills and fever.   Eyes:  Negative for double vision.   Respiratory:  Negative for cough and shortness of breath.    Cardiovascular:  Negative for chest pain and palpitations.   Gastrointestinal:  Negative for abdominal pain, constipation, diarrhea, nausea and vomiting.   Genitourinary:         See HPI   Musculoskeletal:  Negative for falls.   Neurological:  Negative for dizziness and seizures.   Endo/Heme/Allergies:  Negative for polydipsia.   Psychiatric/Behavioral:  Positive for memory loss (started Namenda).          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 Dr. Pham    Hx of colonic polyps     followed by GI. Dr. Pham    Hyperlipidemia associated with type 2 diabetes mellitus     Hyperlipidemia LDL goal <100     Hypernatremia 05/05/2022    Hypertension associated with type 2 diabetes mellitus     Hypertension associated with type 2 diabetes mellitus     Hypotension     Intermittent confusion 04/29/2022    Overweight(278.02)     Prostate cancer 09/2011    followed by urology, Dr. Rogers    Pulmonary embolism     Reducible right inguinal hernia 10/10/2022    Right hemiparesis 07/22/2022    Secondary to cervical hematoma    Type II or unspecified type diabetes mellitus without mention of complication, not stated as uncontrolled     diet controlled    Urinary retention 04/29/2022       Past Surgical History:   Procedure Laterality Date    BRONCHOSCOPY N/A 10/15/2018    Procedure: BRONCHOSCOPY;  Surgeon: [facility];  Location: [facility] OR 2ND FLR;  Service: Anesthesiology;  Laterality: N/A;    CARPAL TUNNEL RELEASE Right 1/11/2023    Procedure: RELEASE, CARPAL TUNNEL;  Surgeon: Romero Lazo MD;  Location: [facility] OR;  Service: Orthopedics;  Laterality: Right;    CATARACT EXTRACTION, BILATERAL  2014    COLONOSCOPY N/A 5/16/2023    Procedure: COLONOSCOPY;  Surgeon: Buster Sequeira MD;  Location: [facility] (4TH FLR);  Service: Endoscopy;  Laterality: N/A;    DECOMPRESSION, NERVE, ULNAR Right 1/11/2023    Procedure: DECOMPRESSION, NERVE, ULNAR - right cub jolie and guyon's decompression as well as open right carpal tunnel release;  Surgeon: Romero Lazo MD;  Location: [facility] OR;  Service: Orthopedics;  Laterality: Right;    ESOPHAGOGASTRODUODENOSCOPY N/A 5/16/2023    Procedure: EGD (ESOPHAGOGASTRODUODENOSCOPY);  Surgeon: Butser Sequeira MD;  Location: [facility] ENDO (4TH FLR);  Service: Endoscopy;  Laterality: N/A;  inst mailed-RB  5/10/23 no answer for precall/mleone lpn    POSTERIOR CERVICAL LAMINECTOMY Bilateral 4/28/2022    Procedure: LAMINECTOMY,  SPINE, CERVICAL, POSTERIOR APPROACH C3-6;  Surgeon: Carlos Miller MD;  Location: [facility] OR [address] [place] [address];  Service: Neurosurgery;  Laterality: Bilateral;    PROSTATECTOMY      REPAIR, HERNIA, INGUINAL, WITHOUT HISTORY OF PRIOR REPAIR, AGE 5 YEARS OR OLDER Right 10/10/2022    Procedure: REPAIR, HERNIA, INGUINAL, WITHOUT HISTORY OF PRIOR REPAIR, AGE 5 YEARS OR OLDER;  Surgeon: Dario Esquivel MD;  Location: [facility] OR [address] [place] [address];  Service: General;  Laterality: Right;  open right inguinal hernia repair with mesh       Family History   Problem Relation Age of Onset    Colon cancer Mother     Diabetes Mother     Heart disease Father     Mental illness Sister     Diabetes Sister     Other Brother         polio as a child    Pulmonary fibrosis Brother     Diabetes Brother     Myasthenia gravis Brother     Parkinsonism Brother     Diabetes Brother     Dementia Brother     Lung cancer Brother         smoker    Diabetes Maternal Aunt     Diabetes Other     Esophageal cancer Neg Hx        Social History     Socioeconomic History    Marital status:      Spouse name: Addis    Number of children: 2   Occupational History    Occupation: tool    Tobacco Use    Smoking status: Former     Current packs/day: 0.00     Average packs/day: 0.3 packs/day for 5.0 years (1.3 ttl pk-yrs)     Types: Cigarettes     Start date: 10/2/1957     Quit date: 10/2/1962     Years since quittin.5    Smokeless tobacco: Former    Tobacco comments:     quit age 23   Substance and Sexual Activity    Alcohol use: Not Currently    Drug use: No    Sexual activity: Yes     Partners: Female     Social Determinants of Health     Financial Resource Strain: Low Risk  (2023)    Overall Financial Resource Strain (CARDIA)     Difficulty of Paying Living Expenses: Not hard at all   Food Insecurity: No Food Insecurity (2023)    Hunger Vital Sign     Worried About Running Out of Food in the Last Year: Never true     Ran Out of Food in the Last  Year: Never true   Transportation Needs: No Transportation Needs (12/4/2023)    PRAPARE - Transportation     Lack of Transportation (Medical): No     Lack of Transportation (Non-Medical): No   Physical Activity: Insufficiently Active (12/4/2023)    Exercise Vital Sign     Days of Exercise per Week: 1 day     Minutes of Exercise per Session: 10 min   Stress: No Stress Concern Present (12/4/2023)    [employer] [employer] - Occupational Stress Questionnaire     Feeling of Stress : Not at all   Social Connections: Moderately Isolated (12/4/2023)    Social Connection and Isolation Panel [NHANES]     Frequency of Communication with Friends and Family: Once a week     Frequency of Social Gatherings with Friends and Family: Once a week     Attends Latter day Services: More than 4 times per year     Active Member of Clubs or Organizations: No     Attends Club or Organization Meetings: Never     Marital Status:    Housing Stability: Low Risk  (12/4/2023)    Housing Stability Vital Sign     Unable to Pay for Housing in the Last Year: No     Number of Places Lived in the Last Year: 1     Unstable Housing in the Last Year: No       Allergies:  Patient has no known allergies.    Medications:    Current Outpatient Medications:     albuterol (VENTOLIN HFA) 90 mcg/actuation inhaler, Inhale 2 puffs into the lungs daily as needed (30 min before arikayce). Rescue, Disp: 18 g, Rfl: 11    ARIKAYCE 590 mg/8.4 mL NbSp, , Disp: , Rfl:     aspirin (ECOTRIN) 81 MG EC tablet, Take 81 mg by mouth once daily., Disp: , Rfl:     atorvastatin (LIPITOR) 10 MG tablet, Take 10 mg by mouth every evening., Disp: , Rfl:     atorvastatin (LIPITOR) 40 MG tablet, Take 40 mg by mouth every evening., Disp: , Rfl:     azelastine (ASTELIN) 137 mcg (0.1 %) nasal spray, 2 sprays (274 mcg total) by Nasal route 2 (two) times daily., Disp: 30 mL, Rfl: 0    b complex vitamins capsule, Take 1 capsule by mouth once daily., Disp: , Rfl:      [facility] 2023-24, 12Y UP,,PF, 30 mcg/0.3 mL inection, , Disp: , Rfl:     donepeziL (ARICEPT) 10 MG tablet, TAKE 1 TABLET (10 MG TOTAL) BY MOUTH ONCE DAILY, Disp: 30 tablet, Rfl: 4    EScitalopram oxalate (LEXAPRO) 10 MG tablet, Take 1.5 tablets (15 mg total) by mouth once daily., Disp: 145 tablet, Rfl: 1    ethambutoL (MYAMBUTOL) 400 MG Tab, Take 2 tablets (800 mg total) by mouth once daily., Disp: 60 tablet, Rfl: 5    FLUZONE HIGHDOSE QUAD 23-24  mcg/0.7 mL Syrg, , Disp: , Rfl:     INV clofazimine capsule, Take 2 capsules (100 mg total) by mouth once daily with meals. For Investigational Use Only. Patient Study Req ID# [id number]. Protocol: [id number], Disp: 200 capsule, Rfl: 0    memantine (NAMENDA) 5 MG Tab, Take 1 tablet (5 mg total) by mouth 2 (two) times daily. Initially 5mg daily for 1 week, and then increased to 5mg twice a day., Disp: 60 tablet, Rfl: 11    nebulizer and compressor (CoinJar COMPRESSOR SYSTEM) Marcy, use to administer nebulized medication as directed, Disp: 1 each, Rfl: 0    pantoprazole (PROTONIX) 40 MG tablet, TAKE 1 TABLET(40 MG) BY MOUTH EVERY DAY 30 MINUTES BEFORE BREAKFAST, Disp: 90 tablet, Rfl: 1    pravastatin (PRAVACHOL) 10 MG tablet, Take 1 tablet (10 mg total) by mouth every evening., Disp: 90 tablet, Rfl: 1    sodium chloride 3% 3 % nebulizer solution, USE 4 ML VIAL IN NEBULIZER  TWICE DAILY, Disp: 480 mL, Rfl: 11    sodium chloride 7% 7 % nebulizer solution, use one vial in nebulizer twice a day, Disp: 500 mL, Rfl: 11    PHYSICAL EXAMINATION:  Physical Exam  Vitals and nursing note reviewed.   Constitutional:       General: He is awake.      Appearance: Normal appearance.   HENT:      Head: Normocephalic.      Right Ear: External ear normal.      Left Ear: External ear normal.      Nose: Nose normal.   Cardiovascular:      Rate and Rhythm: Normal rate.   Pulmonary:      Effort: Pulmonary effort is normal. No respiratory distress.   Abdominal:      General: There is no  distension.      Palpations: Abdomen is soft.      Tenderness: There is no abdominal tenderness. There is no right CVA tenderness, left CVA tenderness or guarding.   Musculoskeletal:         General: Normal range of motion.      Cervical back: Normal range of motion.   Skin:     General: Skin is warm and dry.   Neurological:      General: No focal deficit present.      Mental Status: He is alert and oriented to person, place, and time.   Psychiatric:         Mood and Affect: Mood normal.         Behavior: Behavior is cooperative.           LABS:      In office UA today was clear of active infection and GH.     The PVR in the office today done immediately after urination by the nurse was 170.        Lab Results   Component Value Date    PSA 0.02 10/28/2015    PSADIAG 0.02 06/08/2022       Lab Results   Component Value Date    CREATININE 1.1 02/21/2024    EGFRNORACEVR >60 02/21/2024               IMPRESSION:    Encounter Diagnoses   Name Primary?    Incomplete emptying of bladder Yes    Moderate cognitive impairment     Prostate cancer     History of radical prostatectomy     History of urinary retention          Assessment:       1. Incomplete emptying of bladder    2. Moderate cognitive impairment    3. Prostate cancer    4. History of radical prostatectomy    5. History of urinary retention        Plan:         I spent 30 minutes with the patient of which more than half was spent in direct consultation with the patient in regards to our treatment and plan.  We addressed the office findings and recent labs; any need to go ER today.   Education and recommendations of today's plan of care including home remedies and needed follow up with PCP.   We discussed the chief complaints; reviewed the LUTS and the possible contributory factors.   Reassurance no infection in urine and he is emptying much better.   Reviewed management; including possible medical and surgical options.   Recommended lifestyle modifications with a  proper, healthy diet, good hydration but during the day. Reducing bladder irritants.   Timed bathroom breaks to promote emptying.   Benefits of regular exercise to help control constipation   RTC anytime I am here if having any issues.  F/u one year

## 2024-04-01 ENCOUNTER — CLINICAL SUPPORT (OUTPATIENT)
Dept: REHABILITATION | Facility: HOSPITAL | Age: 84
End: 2024-04-01
Payer: MEDICARE

## 2024-04-01 ENCOUNTER — LAB VISIT (OUTPATIENT)
Dept: LAB | Facility: HOSPITAL | Age: 84
End: 2024-04-01
Attending: INTERNAL MEDICINE
Payer: MEDICARE

## 2024-04-01 DIAGNOSIS — F03.92 NEURODEGENERATIVE DEMENTIA WITH PSYCHOTIC DISTURBANCE: ICD-10-CM

## 2024-04-01 DIAGNOSIS — R47.89 WORD FINDING DIFFICULTY: ICD-10-CM

## 2024-04-01 DIAGNOSIS — A31.0 MYCOBACTERIUM AVIUM-INTRACELLULARE COMPLEX: ICD-10-CM

## 2024-04-01 PROCEDURE — 96125 COGNITIVE TEST BY HC PRO: CPT | Mod: PN

## 2024-04-01 PROCEDURE — 87015 SPECIMEN INFECT AGNT CONCNTJ: CPT | Performed by: INTERNAL MEDICINE

## 2024-04-01 PROCEDURE — 87206 SMEAR FLUORESCENT/ACID STAI: CPT | Performed by: INTERNAL MEDICINE

## 2024-04-01 PROCEDURE — 87116 MYCOBACTERIA CULTURE: CPT | Performed by: INTERNAL MEDICINE

## 2024-04-01 NOTE — PROGRESS NOTES
"Outpatient Neurological Rehabilitation  Speech and Language Therapy Evaluation    Date: 4/1/2024     Name: Diego Gunter   MRN: [id number]    Therapy Diagnosis: No diagnosis found.   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   Cognitive Communication Evaluation  45          Precautions:Neurodegenerative dementia with psychotic disturbance and fall  Subjective   Date of Onset: Per family, "Recently diagnosed with Lewy body's dementia"  History of Current Condition:   Diego Gunter is a 83 y.o. male male who presents to [facility] Outpatient Speech Therapy for evaluation secondary to Neurodegenerative dementia with psychotic disturbance, and Word finding difficulty . Patient was referred to therapy by Chencho Solis PsyD , which is the patient's Neuropsychologist. Patient reports difficulties thinking and finding his words. Patient was accompanied to the evaluation by Addis, his wife.   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), Hard of hearing, colonic polyps, colonic polyps, Hyperlipidemia associated with type 2 diabetes mellitus, Hyperlipidemia LDL goal <100, Hypernatremia (05/05/2022), Hypertension associated " with type 2 diabetes mellitus, Hypertension associated with type 2 diabetes mellitus, Hypotension, Intermittent confusion (04/29/2022), Overweight(278.02), Prostate cancer (09/2011), Pulmonary embolism, Reducible right inguinal hernia (10/10/2022), Right hemiparesis (07/22/2022), Type II or unspecified type diabetes mellitus without mention of complication, not stated as uncontrolled, and Urinary retention (04/29/2022).  Diego Gunter  has a past surgical history that includes Prostatectomy; Cataract extraction, bilateral (2014); Bronchoscopy (N/A, 10/15/2018); Posterior cervical laminectomy (Bilateral, 4/28/2022); repair, hernia, inguinal, without history of prior repair, age 5 years or older (Right, 10/10/2022); decompression, nerve, ulnar (Right, 1/11/2023); Carpal tunnel release (Right, 1/11/2023); Esophagogastroduodenoscopy (N/A, 5/16/2023); and Colonoscopy (N/A, 5/16/2023).  Medical Hx and Allergies:  Diego has a current medication list which includes the following prescription(s): albuterol, arikayce, aspirin, atorvastatin, atorvastatin, azelastine, b complex vitamins, comirnaty 2023-24 (12y up)(pf), donepezil, escitalopram oxalate, ethambutol, fluzone highdose quad 23-24 pf, inv clofazimine, memantine, nebulizer and compressor, pantoprazole, pravastatin, sodium chloride 3%, sodium chloride 7%, and [DISCONTINUED] omega-3 fatty acids. Review of patient's allergies indicates:  No Known Allergies  Imaging:   MRI scan 12/16/2023 FINDINGS:  Intracranial Compartment:     Stable pattern of cerebral volume loss.  No hydrocephalus.     Persistent patchy T2/FLAIR hyperintensity in the supratentorial white matter, nonspecific but most likely reflecting chronic small vessel ischemic changes. No new major vascular distribution infarct. No recent or remote hemorrhage.  No mass effect or midline shift.     Small arachnoid cyst over the posterior left frontal lobe near the vertex.  No new extra-axial blood or fluid  "collections.     Normal vascular flow voids are preserved.     Skull/Extracranial Contents (limited evaluation):     Calvarial marrow signal intensity is unremarkable.     Postsurgical change partially visualized cervical spine.  Small effusion at left atlantooccipital articulation.     Bilateral pseudophakia.     Impression:     No compelling evidence of acute intracranial pathology.     Chronic small vessel ischemic change and cerebral volume loss, similar to prior exam.         Prior Therapy:  Dysphagia Therapy  Social History:  lives with their spouse  Prior Level of Function: modified independence   Current Level of Function: modified independence, he requires extensive time to process information and requires repetition often  Pain:   0/10  Pain Location / Description: no pain indicated throughout session  Nutrition:  oral diet, IDDSI 0 (Thin), and IDDSI 7 (Regular)  Patient's Therapy Goals:  "Help me to think better and find my words."  Objective   Formal Assessment:   Cognitive Linguistic Quick Test (CLQT) was administered to quickly assess the patient's overall cognitive-linguistic function and to determine cognitive strengths and weaknesses.           Cognitive Domain Score     Severity Rating      Attention    159 / 215   mild  Memory    129 / 185   mild  Executive Functions   17 / 40    mild  Language    26 / 37    mild  Visuospatial Skills    52 / 105   mild  Clock Drawing    5 / 13    severe    Composite Severity Rating  3.2 / 4.0   mild    Task Score Ages 70-89 Cut Score Below?   Personal Facts  8  8 average   Symbol Cancellation 11  10 above average   Confrontational naming  10  10 average   Clock drawing  5  11 below average   Story Retelling 5  5 average   Symbol Trails 8  6 above average   Generative Naming 3  4 below average   Design Memory 4  4 average   Mazes 4  4 average   Design Generation  2  5 below average       Treatment   Total Treatment Time Separate from Evaluation: not applicable   No " treatment performed secondary to time to complete evaluation.     Education provided:   -role of Speech Therapy, goals/plan of care, scheduling/cancellations, insurance limitations with patient  -Additional Education provided:   Memory strategies  Attention Strategies  Word finding strategies    Patient and family members expressed understanding.   Assessment     Diego presents to [facility] status post medical diagnosis of Neurodegenerative dementia with psychotic disturbance and Word finding difficulty.     Interpretation of objective assessment:   He presents with mild cognitive communication impairment, secondary to Neurodegenerative dementia with psychotic disturbance  characterized by deficits in attention, memory, executive function, language, and visuospatial skills. Patient and patients wife requested to solely focus on word finding deficits at this time.    Demonstrates impairments including limitations as described in the problem list.     Positive prognostic factors: Spousal support  Negative prognostic factors: progressiveness of Neurodegenerative dementia with psychotic disturbance   Barriers to therapy: Neurodegenerative dementia with psychotic disturbance      Patient's spiritual, cultural, and educational needs considered and patient agreeable to plan of care and goals.    Patient will benefit from skilled therapy.    Rehab Potential: fair    Short Term Goals: (8 weeks) Current Progress:   1. Pt will participate in Semantic Feature Analysis for 4-5 nouns per session to address word finding strategies.     Progressing/ Not Met 4/1/2024   Established this date   2. Pt will utilize word finding strategies to provide synonyms and antonyms to words with 70% accuracy given moderate cues.     Progressing/ Not Met 4/1/2024   Established this date   3. Patient will participate in Attentive Reading and Constrained Summarization with an adequate summary including no more than 5 general  words, extraneous information or pronouns to improve efficiency and topic maintenance in conversation.     Progressing/ Not Met 4/1/2024   Established this date    4. Patient will provide reasoning and or a  possible solutions, given a problem scenario (calendar task) in a structured setting with 80% accuracy across 2 sessions.     Progressing/ Not Met 4/1/2024   Established this date    5. Pt will ID object and pictures named, progressing from FO 2-6 with 90% acc      Progressing/ Not Met 4/1/2024   Established this date    6. Patient will participate in word finding strategies and recall 2+ strategies given minimal cueing     Progressing/ Not Met 4/1/2024   Established this date        Long Term Goals: (10 weeks) Current Progress:   1. Patient and family will participate in discussion pertaining to ways to best support patient in his journey with his neurodegenerative disease.   Established this date     2. Pt will develop compensatory techniques to facilitate  receptive and expressive communication skills for functional communication in home,  social and medical environments     Established this date         Plan     Recommended Treatment Plan:  Patient will participate in the [facility] program for speech therapy 1 times per week for 8 weeks to address his Cognition deficits, to educate patient and their family, and to participate in a home exercise program.    Other Recommendations:   No other recommendations at this time    Therapist's Name:   Saida Bonilla M.S. CCC-SLP  Speech Language Pathologist    4/1/2024

## 2024-04-01 NOTE — PLAN OF CARE
"Outpatient Neurological Rehabilitation  Speech and Language Therapy Evaluation    Date: 4/1/2024     Name: Diego Gunter   MRN: [id number]    Therapy Diagnosis: No diagnosis found.   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   Cognitive Communication Evaluation  45          Precautions:Neurodegenerative dementia with psychotic disturbance and fall  Subjective   Date of Onset: Per family, "Recently diagnosed with Lewy body's dementia"  History of Current Condition:   Diego Gunter is a 83 y.o. male male who presents to [facility] for evaluation secondary to Neurodegenerative dementia with psychotic disturbance, and Word finding difficulty . Patient was referred to therapy by Chencho Solis PsyD , which is the patient's Neuropsychologist. Patient reports difficulties thinking and finding his words. Patient was accompanied to the evaluation by Addis, his wife.   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), Hard of hearing, colonic polyps, colonic polyps, Hyperlipidemia associated with type 2 diabetes mellitus, Hyperlipidemia LDL goal <100, Hypernatremia (05/05/2022), Hypertension associated " with type 2 diabetes mellitus, Hypertension associated with type 2 diabetes mellitus, Hypotension, Intermittent confusion (04/29/2022), Overweight(278.02), Prostate cancer (09/2011), Pulmonary embolism, Reducible right inguinal hernia (10/10/2022), Right hemiparesis (07/22/2022), Type II or unspecified type diabetes mellitus without mention of complication, not stated as uncontrolled, and Urinary retention (04/29/2022).  Diego Gunter  has a past surgical history that includes Prostatectomy; Cataract extraction, bilateral (2014); Bronchoscopy (N/A, 10/15/2018); Posterior cervical laminectomy (Bilateral, 4/28/2022); repair, hernia, inguinal, without history of prior repair, age 5 years or older (Right, 10/10/2022); decompression, nerve, ulnar (Right, 1/11/2023); Carpal tunnel release (Right, 1/11/2023); Esophagogastroduodenoscopy (N/A, 5/16/2023); and Colonoscopy (N/A, 5/16/2023).  Medical Hx and Allergies:  Diego has a current medication list which includes the following prescription(s): albuterol, arikayce, aspirin, atorvastatin, atorvastatin, azelastine, b complex vitamins, comirnaty 2023-24 (12y up)(pf), donepezil, escitalopram oxalate, ethambutol, fluzone highdose quad 23-24 pf, inv clofazimine, memantine, nebulizer and compressor, pantoprazole, pravastatin, sodium chloride 3%, sodium chloride 7%, and [DISCONTINUED] omega-3 fatty acids. Review of patient's allergies indicates:  No Known Allergies  Imaging:   MRI scan 12/16/2023 FINDINGS:  Intracranial Compartment:     Stable pattern of cerebral volume loss.  No hydrocephalus.     Persistent patchy T2/FLAIR hyperintensity in the supratentorial white matter, nonspecific but most likely reflecting chronic small vessel ischemic changes. No new major vascular distribution infarct. No recent or remote hemorrhage.  No mass effect or midline shift.     Small arachnoid cyst over the posterior left frontal lobe near the vertex.  No new extra-axial blood or fluid  "collections.     Normal vascular flow voids are preserved.     Skull/Extracranial Contents (limited evaluation):     Calvarial marrow signal intensity is unremarkable.     Postsurgical change partially visualized cervical spine.  Small effusion at left atlantooccipital articulation.     Bilateral pseudophakia.     Impression:     No compelling evidence of acute intracranial pathology.     Chronic small vessel ischemic change and cerebral volume loss, similar to prior exam.         Prior Therapy:  Dysphagia Therapy  Social History:  lives with their spouse  Prior Level of Function: modified independence   Current Level of Function: modified independence, he requires extensive time to process information and requires repetition often  Pain:   0/10  Pain Location / Description: no pain indicated throughout session  Nutrition:  oral diet, IDDSI 0 (Thin), and IDDSI 7 (Regular)  Patient's Therapy Goals:  "Help me to think better and find my words."  Objective   Formal Assessment:   Cognitive Linguistic Quick Test (CLQT) was administered to quickly assess the patient's overall cognitive-linguistic function and to determine cognitive strengths and weaknesses.           Cognitive Domain Score     Severity Rating      Attention    159 / 215   mild  Memory    129 / 185   mild  Executive Functions   17 / 40    mild  Language    26 / 37    mild  Visuospatial Skills    52 / 105   mild  Clock Drawing    5 / 13    severe    Composite Severity Rating  3.2 / 4.0   mild    Task Score Ages 70-89 Cut Score Below?   Personal Facts  8  8 average   Symbol Cancellation 11  10 above average   Confrontational naming  10  10 average   Clock drawing  5  11 below average   Story Retelling 5  5 average   Symbol Trails 8  6 above average   Generative Naming 3  4 below average   Design Memory 4  4 average   Mazes 4  4 average   Design Generation  2  5 below average       Treatment   Total Treatment Time Separate from Evaluation: not applicable   No " treatment performed secondary to time to complete evaluation.     Education provided:   -role of Speech Therapy, goals/plan of care, scheduling/cancellations, insurance limitations with patient  -Additional Education provided:   Memory strategies  Attention Strategies  Word finding strategies    Patient and family members expressed understanding.   Assessment     Diego presents to [facility] status post medical diagnosis of Neurodegenerative dementia with psychotic disturbance and Word finding difficulty.     Interpretation of objective assessment:   He presents with mild cognitive communication impairment, secondary to Neurodegenerative dementia with psychotic disturbance  characterized by deficits in attention, memory, executive function, language, and visuospatial skills. Patient and patients wife requested to solely focus on word finding deficits at this time.    Demonstrates impairments including limitations as described in the problem list.     Positive prognostic factors: Spousal support  Negative prognostic factors: progressiveness of Neurodegenerative dementia with psychotic disturbance   Barriers to therapy: Neurodegenerative dementia with psychotic disturbance      Patient's spiritual, cultural, and educational needs considered and patient agreeable to plan of care and goals.    Patient will benefit from skilled therapy.    Rehab Potential: fair    Short Term Goals: (8 weeks) Current Progress:   1. Pt will participate in Semantic Feature Analysis for 4-5 nouns per session to address word finding strategies.     Progressing/ Not Met 4/1/2024   Established this date   2. Pt will utilize word finding strategies to provide synonyms and antonyms to words with 70% accuracy given moderate cues.     Progressing/ Not Met 4/1/2024   Established this date   3. Patient will participate in Attentive Reading and Constrained Summarization with an adequate summary including no more than 5 general  words, extraneous information or pronouns to improve efficiency and topic maintenance in conversation.     Progressing/ Not Met 4/1/2024   Established this date    4. Patient will provide reasoning and or a  possible solutions, given a problem scenario (calendar task) in a structured setting with 80% accuracy across 2 sessions.     Progressing/ Not Met 4/1/2024   Established this date    5. Pt will ID object and pictures named, progressing from FO 2-6 with 90% acc      Progressing/ Not Met 4/1/2024   Established this date    6. Patient will participate in word finding strategies and recall 2+ strategies given minimal cueing     Progressing/ Not Met 4/1/2024   Established this date        Long Term Goals: (10 weeks) Current Progress:   1. Patient and family will participate in discussion pertaining to ways to best support patient in his journey with his neurodegenerative disease.   Established this date     2. Pt will develop compensatory techniques to facilitate  receptive and expressive communication skills for functional communication in home,  social and medical environments     Established this date         Plan     Recommended Treatment Plan:  Patient will participate in the [facility] program for speech therapy 1 times per week for 8 weeks to address his Cognition deficits, to educate patient and their family, and to participate in a home exercise program.    Other Recommendations:   No other recommendations at this time    Therapist's Name:   Saida Bonilla M.S. CCC-SLP  Speech Language Pathologist    4/1/2024

## 2024-04-02 ENCOUNTER — CLINICAL SUPPORT (OUTPATIENT)
Dept: REHABILITATION | Facility: HOSPITAL | Age: 84
End: 2024-04-02
Payer: MEDICARE

## 2024-04-02 DIAGNOSIS — R47.89 WORD FINDING DIFFICULTY: Primary | ICD-10-CM

## 2024-04-02 PROCEDURE — 92507 TX SP LANG VOICE COMM INDIV: CPT | Mod: PN

## 2024-04-02 NOTE — PROGRESS NOTES
[facility]  Speech Therapy Treatment Note- Neurological Rehabilitation  Date: 4/2/2024     Name: Diego Gunter   MRN: [id number]   Therapy Diagnosis:   Encounter Diagnosis   Name Primary?    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   He was compliant to home exercise program.   Response to previous treatment: fair  Pain Scale: no pain indicated throughout session  Objective:       UNTIMED  Procedure   Speech- Language- Voice Therapy        Short Term Goals: (8 weeks) Current Progress:   1. Pt will participate in Semantic Feature Analysis for 4-5 nouns per session to address word finding strategies.      Progressing/ Not Met 4/1/2024   SFA initiated. Patient with accurate description of x5 items given minimal cues. He demonstrated no difficulties with initially retrieving name of object.    Met x1   2. Pt will utilize word finding strategies to provide synonyms and antonyms to words with 70% accuracy given moderate cues.      Progressing/ Not Met 4/1/2024   Established this date   3. Patient will participate in Attentive Reading and Constrained Summarization with an adequate summary including no more than 5 general words, extraneous information or pronouns to improve efficiency and topic maintenance in conversation.      Progressing/ Not Met 4/1/2024   Patient  completed x3 rounds of ARCS with minimal cueing.          Met x1   4. Patient will provide reasoning and or a  possible solutions, given a problem scenario (calendar task) in a structured setting with 80% accuracy across 2 sessions.      Progressing/ Not Met 2024   Established this date    5. Pt will ID object and pictures named, progressing from FO 2-6 with 90% acc       Progressing/ Not Met 2024   Patient ID object in F02 with 100% accuracy independently.   6. Patient will participate in word finding strategies and recall 2+ strategies given minimal cueing      Progressing/ Not Met 2024   Established this date          Long Term Goals: (10 weeks) Current Progress:   1. Patient and family will participate in discussion pertaining to ways to best support patient in his journey with his neurodegenerative disease.    Established this date     2. Pt will develop compensatory techniques to facilitate  receptive and expressive communication skills for functional communication in home,  social and medical environments     Established this date        Patient Education/Response:   Patient educated regarding the followin. Reviewed results of assessment  2. Discussed goals  3. Discussed     Home program established: yes-complete memory book and implement word finding strategies  Patient verbalized understanding to all above education provided.     See Electronic Medical Record under Patient Instructions for exercises provided throughout therapy.  Assessment:   Diego is progressing well towards his goals. Majority of session as spent in reviewing education and goals. Patient demonstrated adequate by in to therapy. He was able to complete ARCS with 80% accuracy given minimal cues. Patient completed SFA with objects in room with 100% accuracy given minimal cues. Patients wife seen with great participation and caregiver education as related to patients diagnosis. ST provided patient with handouts on education  and additional memory book to aid in orientation and memory recall as patient wishes to focus on word finding. Both parties were appreciative of information provided. Current goals remain appropriate. Goals to be updated as necessary.     Patient prognosis is Fair. Patient will continue to benefit from skilled outpatient speech and language therapy to address the deficits listed in the problem list on initial evaluation, provide patient/family education and to maximize patient's level of independence in the home and community environment.   Medical necessity is demonstrated by the following IMPAIRMENTS:  Aphasia: decreased content words and significant word finding difficulty in all situations severely limiting functional communication with both familiar and unfamiliar communication partners to relay medically and safety relevant information in a timely manner in a state of emergency.    Cognition: Deficits in executive functioning, attention, and memory prevent the pt from relaying medically and safety relevant information in a timely manner in a state of emergency.   Barriers to Therapy: Progressive Neurodegenerative dementia   Patient's spiritual, cultural and educational needs considered and patient agreeable to plan of care and goals.  Plan:   Continue Plan of Care with focus on rehabilitation and compensation for cognitive and language deficits.    Saida Bonilla CCC-SLP   4/2/2024

## 2024-04-03 NOTE — PROGRESS NOTES
[facility]  Speech Therapy Treatment Note- Neurological Rehabilitation  Date: 4/9/2024     Name: Diego Gunter   MRN: [id number]   Therapy Diagnosis:   Encounter Diagnoses   Name Primary?    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 Attended session independently.  He was compliant to home exercise program.   Response to previous treatment: fair  Pain Scale: no pain indicated throughout session  Objective:       UNTIMED  Procedure   Speech- Language- Voice Therapy        Short Term Goals: (8 weeks) Current Progress:   1. Pt will participate in Semantic Feature Analysis for 4-5 nouns per session to address word finding strategies.      Progressing/ Not Met 4/1/2024   SFA initiated. Patient with accurate description of x5 items given minimal cues. He demonstrated no difficulties with initially retrieving name of object.    Met x1   2. Pt will utilize word finding strategies to provide synonyms and antonyms to words with 70% accuracy given moderate cues.      Progressing/ Not Met 4/1/2024   Patient able to locate synonyms and antonyms given 3 choices with 80% accuracy independently.     Met x1   3. Patient will participate in Attentive Reading and Constrained Summarization with an adequate summary including no more than 5  general words, extraneous information or pronouns to improve efficiency and topic maintenance in conversation.      Progressing/ Not Met 2024   Patient completed x2 rounds of ARCS with minimal cueing.  Patient able to provide adequate synopsis of short paragraph given minimal cues        Met x1   4. Patient will provide reasoning and or a  possible solutions, given a problem scenario (calendar task) in a structured setting with 80% accuracy across 2 sessions.      Progressing/ Not Met 2024   Patient able to provide reasoning when completing sustained attention tasks with out difficulties    Goal Met 2024      5. Pt will ID object and pictures named, progressing from FO 2-6 with 90% acc       Progressing/ Not Met 2024   Patient ID object in F04 with 100% accuracy independently.      Met x2   6. Patient will participate in word finding strategies and recall 2+ strategies given minimal cueing      Progressing/ Not Met 2024   Patient able to utilize similar word strategy in conversational speech with out difficulties    Met x1         Long Term Goals: (10 weeks) Current Progress:   1. Patient and family will participate in discussion pertaining to ways to best support patient in his journey with his neurodegenerative disease.    Established this date     2. Pt will develop compensatory techniques to facilitate  receptive and expressive communication skills for functional communication in home,  social and medical environments     Established this date        Patient Education/Response:   Patient educated regarding the followin. Reviewed information in memory book  2. Metacognitive strategies to aid in picture ID  3. Word finding strategies    Home program established: yes-complete memory book and implement word finding strategies  Patient verbalized understanding to all above education provided.     See Electronic Medical Record under Patient Instructions for exercises provided throughout  therapy.  Assessment:   Diego is progressing well towards his goals. Patient shared current completion of memory book. Patient seen to have written in his book independently. Patient with great completion. He demonstrated improved ability to find words in conversation and in structured tasks. Patient with great ability to generate synonyms and antonyms independently and given minimal cues. Patient seen with improved abilities to ID objects via tactus therapy marine without difficulties. Current goals remain appropriate. Goals to be updated as necessary.     Patient prognosis is Fair. Patient will continue to benefit from skilled outpatient speech and language therapy to address the deficits listed in the problem list on initial evaluation, provide patient/family education and to maximize patient's level of independence in the home and community environment.   Medical necessity is demonstrated by the following IMPAIRMENTS:  Aphasia: decreased content words and significant word finding difficulty in all situations severely limiting functional communication with both familiar and unfamiliar communication partners to relay medically and safety relevant information in a timely manner in a state of emergency.    Cognition: Deficits in executive functioning, attention, and memory prevent the pt from relaying medically and safety relevant information in a timely manner in a state of emergency.   Barriers to Therapy: Progressive Neurodegenerative dementia   Patient's spiritual, cultural and educational needs considered and patient agreeable to plan of care and goals.  Plan:   Continue Plan of Care with focus on rehabilitation and compensation for cognitive and language deficits.    Saida Bonilla CCC-SLP   4/9/2024

## 2024-04-09 ENCOUNTER — CLINICAL SUPPORT (OUTPATIENT)
Dept: REHABILITATION | Facility: HOSPITAL | Age: 84
End: 2024-04-09
Payer: MEDICARE

## 2024-04-09 DIAGNOSIS — R41.841 COGNITIVE COMMUNICATION DEFICIT: Primary | ICD-10-CM

## 2024-04-09 DIAGNOSIS — R47.89 WORD FINDING DIFFICULTY: ICD-10-CM

## 2024-04-09 DIAGNOSIS — R13.10 DYSPHAGIA, UNSPECIFIED TYPE: ICD-10-CM

## 2024-04-09 PROCEDURE — 92507 TX SP LANG VOICE COMM INDIV: CPT | Mod: PN

## 2024-04-12 ENCOUNTER — OFFICE VISIT (OUTPATIENT)
Dept: OTOLARYNGOLOGY | Facility: CLINIC | Age: 84
End: 2024-04-12
Payer: MEDICARE

## 2024-04-12 ENCOUNTER — CLINICAL SUPPORT (OUTPATIENT)
Dept: OTOLARYNGOLOGY | Facility: CLINIC | Age: 84
End: 2024-04-12
Payer: MEDICARE

## 2024-04-12 ENCOUNTER — TELEPHONE (OUTPATIENT)
Dept: INFECTIOUS DISEASES | Facility: CLINIC | Age: 84
End: 2024-04-12
Payer: MEDICARE

## 2024-04-12 VITALS
BODY MASS INDEX: 24.27 KG/M2 | WEIGHT: 159.63 LBS | SYSTOLIC BLOOD PRESSURE: 133 MMHG | DIASTOLIC BLOOD PRESSURE: 72 MMHG | HEART RATE: 47 BPM

## 2024-04-12 DIAGNOSIS — H93.293 ABNORMAL AUDITORY PERCEPTION OF BOTH EARS: Primary | ICD-10-CM

## 2024-04-12 DIAGNOSIS — H90.3 SENSORINEURAL HEARING LOSS (SNHL) OF BOTH EARS: ICD-10-CM

## 2024-04-12 DIAGNOSIS — H93.13 TINNITUS OF BOTH EARS: ICD-10-CM

## 2024-04-12 DIAGNOSIS — H61.23 BILATERAL IMPACTED CERUMEN: Primary | ICD-10-CM

## 2024-04-12 PROCEDURE — 1101F PT FALLS ASSESS-DOCD LE1/YR: CPT | Mod: CPTII,S$GLB,, | Performed by: NURSE PRACTITIONER

## 2024-04-12 PROCEDURE — 99214 OFFICE O/P EST MOD 30 MIN: CPT | Mod: 25,S$GLB,, | Performed by: NURSE PRACTITIONER

## 2024-04-12 PROCEDURE — 1160F RVW MEDS BY RX/DR IN RCRD: CPT | Mod: CPTII,S$GLB,, | Performed by: NURSE PRACTITIONER

## 2024-04-12 PROCEDURE — 92557 COMPREHENSIVE HEARING TEST: CPT | Mod: S$GLB,,,

## 2024-04-12 PROCEDURE — 1159F MED LIST DOCD IN RCRD: CPT | Mod: CPTII,S$GLB,, | Performed by: NURSE PRACTITIONER

## 2024-04-12 PROCEDURE — 69210 REMOVE IMPACTED EAR WAX UNI: CPT | Mod: S$GLB,,, | Performed by: NURSE PRACTITIONER

## 2024-04-12 PROCEDURE — 99999 PR PBB SHADOW E&M-EST. PATIENT-LVL I: CPT | Mod: PBBFAC,,,

## 2024-04-12 PROCEDURE — 1157F ADVNC CARE PLAN IN RCRD: CPT | Mod: CPTII,S$GLB,, | Performed by: NURSE PRACTITIONER

## 2024-04-12 PROCEDURE — 92567 TYMPANOMETRY: CPT | Mod: S$GLB,,,

## 2024-04-12 PROCEDURE — 99999 PR PBB SHADOW E&M-EST. PATIENT-LVL IV: CPT | Mod: PBBFAC,,, | Performed by: NURSE PRACTITIONER

## 2024-04-12 PROCEDURE — 1126F AMNT PAIN NOTED NONE PRSNT: CPT | Mod: CPTII,S$GLB,, | Performed by: NURSE PRACTITIONER

## 2024-04-12 PROCEDURE — 3078F DIAST BP <80 MM HG: CPT | Mod: CPTII,S$GLB,, | Performed by: NURSE PRACTITIONER

## 2024-04-12 PROCEDURE — 3288F FALL RISK ASSESSMENT DOCD: CPT | Mod: CPTII,S$GLB,, | Performed by: NURSE PRACTITIONER

## 2024-04-12 PROCEDURE — 3075F SYST BP GE 130 - 139MM HG: CPT | Mod: CPTII,S$GLB,, | Performed by: NURSE PRACTITIONER

## 2024-04-12 RX ORDER — ALBUTEROL SULFATE 90 UG/1
1 AEROSOL, METERED RESPIRATORY (INHALATION) DAILY PRN
Qty: 18 G | Refills: 6 | Status: SHIPPED | OUTPATIENT
Start: 2024-04-12

## 2024-04-12 NOTE — TELEPHONE ENCOUNTER
Received letter from insurance that I need to subsitute proair for other form of albuterol. Rx sent to [facility]

## 2024-04-12 NOTE — PROGRESS NOTES
iDego Gunter, a 83 y.o. male was seen today in the clinic for an audiologic evaluation after ear cleaning. Mr. Gunter reports decrease hearing perception in both ears. He has been fit with hearing aids in the past, buts reports inconsistent use. Mr. Gunter does experience tinnitus in both ears. He denies drainage and ear pain. 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     Pure tone testing revealed mild sloping to severe sensorineural hearing loss, bilaterally. Speech reception thresholds were obtained at 45 dBHL in the right ear and 45 dBHL in the left ear. Speech discrimination scores were 72% in the right ear and 64% in the left ear. Tympanometry revealed Type A tympanograms in both ears.    Recommendations:  Otologic evaluation  Annual audiologic evaluation  Hearing protection in noise  Increase use of amplification  Hearing aid follow up as needed to address any concerns

## 2024-04-12 NOTE — PROCEDURES
Ear Cerumen Removal    Date/Time: 4/12/2024 2:20 PM    Performed by: Diane Dalton NP  Authorized by: Diane Dalton NP    Consent Done?:  Yes (Verbal)    Local anesthetic:  None  Location details:  Both ears  Procedure type: curette    Procedure type comment:  Suction  Cerumen  Removal Results:  Cerumen completely removed  Patient tolerance:  Patient tolerated the procedure well with no immediate complications

## 2024-04-12 NOTE — PROGRESS NOTES
Patient ID: Diego Gunter is a 83 y.o. y.o. male    Chief Complaint:   Chief Complaint   Patient presents with    Cerumen Impaction       Patient is here for routine wax impaction removal.  he has complaints of hearing loss in the affected ears, but denies pain or drainage.  This has been an issue in the past.  The patient has been using any sort of ear drop to soften the wax.  He has been fit with hearing aids in the past, but reports inconsistent use. He has tinnitus in both ears. He denies drainage and ear pain. 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       Review of Systems   Constitutional: Negative for fever, chills, fatigue and unexpected weight change.   HENT: Positive for ear blockage. Negative for hearing loss, nosebleeds, congestion, sore throat, facial swelling, rhinorrhea, sneezing, trouble swallowing, dental problem, voice change, postnasal drip, sinus pressure, tinnitus and ear discharge.    Eyes: Negative for redness, itching and visual disturbance.   Respiratory: Negative for cough, choking and wheezing.    Cardiovascular: Negative for chest pain and palpitations.   Gastrointestinal: Negative for abdominal pain.        No reflux.   Musculoskeletal: Negative for gait problem.   Skin: Negative for rash.   Neurological: Negative for dizziness, light-headedness and headaches.     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 Dr. Pham    Hx of colonic polyps     followed by GI. Dr. Pham    Hyperlipidemia associated with type 2 diabetes mellitus      Hyperlipidemia LDL goal <100     Hypernatremia 05/05/2022    Hypertension associated with type 2 diabetes mellitus     Hypertension associated with type 2 diabetes mellitus     Hypotension     Intermittent confusion 04/29/2022    Overweight(278.02)     Prostate cancer 09/2011    followed by urology, Dr. Rogers    Pulmonary embolism     Reducible right inguinal hernia 10/10/2022    Right hemiparesis 07/22/2022    Secondary to cervical hematoma    Type II or unspecified type diabetes mellitus without mention of complication, not stated as uncontrolled     diet controlled    Urinary retention 04/29/2022     Past Surgical History:   Procedure Laterality Date    BRONCHOSCOPY N/A 10/15/2018    Procedure: BRONCHOSCOPY;  Surgeon: [facility] Diagnostic Provider;  Location: [facility] OR 2ND FLR;  Service: Anesthesiology;  Laterality: N/A;    CARPAL TUNNEL RELEASE Right 1/11/2023    Procedure: RELEASE, CARPAL TUNNEL;  Surgeon: Romero Lazo MD;  Location: [facility];  Service: Orthopedics;  Laterality: Right;    CATARACT EXTRACTION, BILATERAL  2014    COLONOSCOPY N/A 5/16/2023    Procedure: COLONOSCOPY;  Surgeon: Buster Sequeira MD;  Location: [facility] (4TH FLR);  Service: Endoscopy;  Laterality: N/A;    DECOMPRESSION, NERVE, ULNAR Right 1/11/2023    Procedure: DECOMPRESSION, NERVE, ULNAR - right cub jolie and guyon's decompression as well as open right carpal tunnel release;  Surgeon: Romero Lazo MD;  Location: [facility] OR;  Service: Orthopedics;  Laterality: Right;    ESOPHAGOGASTRODUODENOSCOPY N/A 5/16/2023    Procedure: EGD (ESOPHAGOGASTRODUODENOSCOPY);  Surgeon: Buster Sequeira MD;  Location: [facility] (4TH FLR);  Service: Endoscopy;  Laterality: N/A;  inst mailed-RB  5/10/23 no answer for precall/mleone lpn    POSTERIOR CERVICAL LAMINECTOMY Bilateral 4/28/2022    Procedure: LAMINECTOMY, SPINE, CERVICAL, POSTERIOR APPROACH C3-6;  Surgeon: Carlos Miller MD;  Location: [facility] OR 2ND FLR;  Service: Neurosurgery;  Laterality: Bilateral;     PROSTATECTOMY      REPAIR, HERNIA, INGUINAL, WITHOUT HISTORY OF PRIOR REPAIR, AGE 5 YEARS OR OLDER Right 10/10/2022    Procedure: REPAIR, HERNIA, INGUINAL, WITHOUT HISTORY OF PRIOR REPAIR, AGE 5 YEARS OR OLDER;  Surgeon: Dario Esquivel MD;  Location: [facility] OR [address] [place] [address];  Service: General;  Laterality: Right;  open right inguinal hernia repair with mesh     Social History     Socioeconomic History    Marital status:      Spouse name: Addis    Number of children: 2   Occupational History    Occupation: tool    Tobacco Use    Smoking status: Former     Current packs/day: 0.00     Average packs/day: 0.3 packs/day for 5.0 years (1.3 ttl pk-yrs)     Types: Cigarettes     Start date: 10/2/1957     Quit date: 10/2/1962     Years since quittin.5    Smokeless tobacco: Former    Tobacco comments:     quit age 23   Substance and Sexual Activity    Alcohol use: Not Currently    Drug use: No    Sexual activity: Yes     Partners: Female     Social Determinants of Health     Financial Resource Strain: Low Risk  (2023)    Overall Financial Resource Strain (CARDIA)     Difficulty of Paying Living Expenses: Not hard at all   Food Insecurity: No Food Insecurity (2023)    Hunger Vital Sign     Worried About Running Out of Food in the Last Year: Never true     Ran Out of Food in the Last Year: Never true   Transportation Needs: No Transportation Needs (2023)    PRAPARE - Transportation     Lack of Transportation (Medical): No     Lack of Transportation (Non-Medical): No   Physical Activity: Insufficiently Active (2023)    Exercise Vital Sign     Days of Exercise per Week: 1 day     Minutes of Exercise per Session: 10 min   Stress: No Stress Concern Present (2023)    [employer] - Occupational Stress Questionnaire     Feeling of Stress : Not at all   Social Connections: Unknown (2023)    Social Connection and Isolation Panel [NHANES]     Frequency of  Communication with Friends and Family: Once a week     Frequency of Social Gatherings with Friends and Family: Once a week     Active Member of Clubs or Organizations: No     Attends Club or Organization Meetings: Never     Marital Status:    Recent Concern: Social Connections - Moderately Isolated (12/4/2023)    Social Connection and Isolation Panel [NHANES]     Frequency of Communication with Friends and Family: Once a week     Frequency of Social Gatherings with Friends and Family: Once a week     Attends Anabaptist Services: More than 4 times per year     Active Member of Clubs or Organizations: No     Attends Club or Organization Meetings: Never     Marital Status:    Housing Stability: Unknown (12/4/2023)    Housing Stability Vital Sign     Unable to Pay for Housing in the Last Year: No     Unstable Housing in the Last Year: No     Family History   Problem Relation Age of Onset    Colon cancer Mother     Diabetes Mother     Heart disease Father     Mental illness Sister     Diabetes Sister     Other Brother         polio as a child    Pulmonary fibrosis Brother     Diabetes Brother     Myasthenia gravis Brother     Parkinsonism Brother     Diabetes Brother     Dementia Brother     Lung cancer Brother         smoker    Diabetes Maternal Aunt     Diabetes Other     Esophageal cancer Neg Hx        Objective:      Vitals:    04/12/24 1340   BP: 133/72   Pulse: (!) 47       Physical Exam   Constitutional: Well appearing / communicating without difficutly.  NAD.  Eyes: EOM I Bilaterally  Head/Face: Normocephalic. Negative paranasal sinus pressure/tenderness.  Salivary glands WNL.  House Brackmann I Bilaterally.     Right Ear: Auricle normal appearance. External Auditory Canal with cerumen impaction. EAC with no lesions or masses,TM w/o masses/lesions/perforations. TM mobility noted.   Left Ear: Auricle normal appearance. External Auditory Canal with cerumen impaction. EAC with no lesions or masses,TM  w/o masses/lesions/perforations. TM mobility noted.  Nose: No gross nasal septal deviation. Inferior Turbinates 3+ bilaterally. No septal perforation. No masses/lesions. External nasal skin appears normal without masses/lesions.   Oral Cavity: Gingiva/lips within normal limits.  Dentition/gingiva healthy appearing. Mucus membranes moist. Floor of mouth soft, no masses palpated. Oral Tongue mobile. Hard Palate appears normal.    Oropharynx: Base of tongue appears normal. No masses/lesions noted. Tonsillar fossa/pharyngeal wall without lesions. Posterior oropharynx WNL.  Soft palate without masses. Midline uvula.   Neck/Lymphatic: No LAD I-VI bilaterally.  No thyromegaly.  No masses noted on exam.     Mirror laryngoscopy/nasopharyngoscopy: Active gag reflex.  Unable to perform.     Neuro/Psychiatric: AOx3.  Normal mood and affect.   Cardiovascular: Normal carotid pulses bilaterally, no increasing jugular venous distention noted at cervical region bilaterally.    Respiratory: Normal respiratory effort, no stridor, no retractions noted.      Cerumen removal under binocular microscopy   Ear Cerumen Removal    Date/Time: 4/12/2024 2:20 PM    Performed by: Diane Dalton NP  Authorized by: Diane Dalton NP    Consent Done?:  Yes (Verbal)    Local anesthetic:  None  Location details:  Both ears  Procedure type: curette    Procedure type comment:  Suction  Cerumen  Removal Results:  Cerumen completely removed  Patient tolerance:  Patient tolerated the procedure well with no immediate complications    Audiogram interpreted personally by me and discussed in detail with the patient today.   Pure tone testing revealed mild sloping to severe sensorineural hearing loss, bilaterally. Speech reception thresholds were obtained at 45 dBHL in the right ear and 45 dBHL in the left ear. Speech discrimination scores were 72% in the right ear and 64% in the left ear. Tympanometry revealed Type A tympanograms in both ears.          Assessment:         ICD-10-CM ICD-9-CM    1. Bilateral impacted cerumen  H61.23 380.4 Ear Cerumen Removal      2. Sensorineural hearing loss (SNHL) of both ears  H90.3 389.18            Plan:       1.   Cerumen impaction:  Removed under microscopy today without difficulty.  I would recommend the use of a wax softening drop, either over the counter Debrox or mineral oil, on a weekly basis.  I also instructed the patient to avoid Qtips.  2. We reviewed the patient's recent audiogram and hearing loss in detail.   We also discussed the use hearing protection when exposed to loud noise, including lawn equipment.    3. Follow up 1 year or sooner as needed        Diane Dalton NP    Answers submitted by the patient for this visit:  Review of Symptoms Questionnaire  (Submitted on 4/6/2024)  None of these: Yes  None of these : Yes  None of these: Yes  None of these : Yes  None of these: Yes  None of these: Yes  None of these: Yes  None of these : Yes  None of these: Yes  None of these : Yes  None of these: Yes  None of these: Yes  None of these: Yes

## 2024-04-17 ENCOUNTER — TELEPHONE (OUTPATIENT)
Dept: INFECTIOUS DISEASES | Facility: CLINIC | Age: 84
End: 2024-04-17
Payer: MEDICARE

## 2024-04-17 NOTE — TELEPHONE ENCOUNTER
Called patient to informed     ----- Message from Paola Rao MD sent at 4/17/2024 11:04 AM CDT -----  Regarding: FW: Inhaler  Contact: Wife @ [phone]  Can you let the patient know that I discussed with our PharmD and amikacin should not affect his dementia  ----- Message -----  From: Yuan Ruiz, PharmD  Sent: 4/16/2024   2:06 PM CDT  To: Paola Rao MD; Corrina Kasper MA  Subject: RE: Inhaler                                      I would not anticipate inhaled amikacin to be a casue of dementia.  ----- Message -----  From: Paola Rao MD  Sent: 4/16/2024  10:37 AM CDT  To: Yuan Ruiz, PharmD; Corrina Kasper MA  Subject: FW: Inhaler                                      Hi Sundar, patient sent message (see below). From my understanding the Arikayce should not affect dementia, but just wanted to check with you. Thanks  ----- Message -----  From: Corrina Kasper MA  Sent: 4/15/2024   3:04 PM CDT  To: Paola Rao MD  Subject: FW: Inhaler                                        ----- Message -----  From: Mihaela Ruffin  Sent: 4/15/2024   2:18 PM CDT  To: Maira Guevara Staff  Subject: Inhaler                                          Pt's wife is calling to see if medication Arikayce will affect his dementia  . Asking for a call back

## 2024-04-23 NOTE — PROGRESS NOTES
[facility]  Speech Therapy Treatment Note- Neurological Rehabilitation  Date: 4/25/2024     Name: Diego Gunter   MRN: [id number]   Therapy Diagnosis:   Encounter Diagnoses   Name Primary?    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   He was compliant to home exercise program.   Response to previous treatment: fair  Pain Scale: no pain indicated throughout session  Objective:       UNTIMED  Procedure   Speech- Language- Voice Therapy        Short Term Goals: (8 weeks) Current Progress:   1. Pt will participate in Semantic Feature Analysis for 4-5 nouns per session to address word finding strategies.      Progressing/ Not Met 4/1/2024   SFA initiated. Patient with accurate description of x5 items given minimal cues. He demonstrated no difficulties with initially retrieving name of object.    Met x1   2. Pt will utilize word finding strategies to provide synonyms and antonyms to words with 70% accuracy given moderate cues.      Progressing/ Not Met 4/1/2024   Patient able to locate synonyms and antonyms given 3 choices with 80% accuracy independently.     Met x1   3. Patient will participate in Attentive Reading and Constrained Summarization with an adequate summary including no more than 5 general words, extraneous  information or pronouns to improve efficiency and topic maintenance in conversation.      Progressing/ Not Met 2024   Patient completed x2 rounds of ARCS with minimal cueing.  Patient able to provide adequate synopsis of short paragraph given minimal cues        Met x1   4. Patient will provide reasoning and or a  possible solutions, given a problem scenario (calendar task) in a structured setting with 80% accuracy across 2 sessions.      Progressing/ Not Met 2024   Patient able to provide reasoning when completing sustained attention tasks with out difficulties    Goal Met 2024      5. Pt will ID object and pictures named, progressing from FO 2-6 with 90% acc       Progressing/ Not Met 2024   Patient ID object in F04 with 100% accuracy independently.      Met x2   6. Patient will participate in word finding strategies and recall 2+ strategies given minimal cueing      Progressing/ Not Met 2024   Patient able to utilize similar word strategy in conversational speech with out difficulties    Met x1         Long Term Goals: (10 weeks) Current Progress:   1. Patient and family will participate in discussion pertaining to ways to best support patient in his journey with his neurodegenerative disease.    Established this date     2. Pt will develop compensatory techniques to facilitate  receptive and expressive communication skills for functional communication in home,  social and medical environments     Established this date        Patient Education/Response:   Patient educated regarding the followin. Reviewed information in memory book  2. Metacognitive strategies to aid in picture ID  3. Word finding strategies    Home program established: yes-complete memory book and implement word finding strategies  Patient verbalized understanding to all above education provided.     See Electronic Medical Record under Patient Instructions for exercises provided throughout therapy.  Assessment:   Diego  "is progressing well towards his goals. Patient was pleasant. Patient seen with continued great use of word finding strategies. Patient seen with x1 episodes during today's session with difficulties in finding words in conversational speech. Patient able to independently recover utilizing similar word and describing word strategy. Patient shares that "often times I have trouble hearing." However, his hearing aids are intact and properly tuned per patients wife. He compensate (asks for repetition to ensure clarity with 80-90% accuracy independently). ST encourages this metacognitive strategy.  Note- given patients medical dx (dementia) it is a barrier to therapy prognosis.    Patient shared current completion of memory book. Patient seen to have written in his book independently. Patient with great completion. He demonstrated improved ability to find words in conversation and in structured tasks. Patient with great ability to generate synonyms and antonyms independently and given minimal cues. Patient seen with improved abilities to ID objects via tactus therapy marine without difficulties. Current goals remain appropriate. Goals to be updated as necessary.     Patient prognosis is Fair. Patient will continue to benefit from skilled outpatient speech and language therapy to address the deficits listed in the problem list on initial evaluation, provide patient/family education and to maximize patient's level of independence in the home and community environment.   Medical necessity is demonstrated by the following IMPAIRMENTS:  Aphasia: decreased content words and significant word finding difficulty in all situations severely limiting functional communication with both familiar and unfamiliar communication partners to relay medically and safety relevant information in a timely manner in a state of emergency.    Cognition: Deficits in executive functioning, attention, and memory prevent the pt from relaying medically and " safety relevant information in a timely manner in a state of emergency.   Barriers to Therapy: Progressive Neurodegenerative dementia   Patient's spiritual, cultural and educational needs considered and patient agreeable to plan of care and goals.  Plan:   Continue Plan of Care with focus on rehabilitation and compensation for cognitive and language deficits.    Saida Bonilla CCC-SLP   4/25/2024

## 2024-04-25 ENCOUNTER — CLINICAL SUPPORT (OUTPATIENT)
Dept: REHABILITATION | Facility: HOSPITAL | Age: 84
End: 2024-04-25
Payer: MEDICARE

## 2024-04-25 DIAGNOSIS — R13.10 DYSPHAGIA, UNSPECIFIED TYPE: ICD-10-CM

## 2024-04-25 DIAGNOSIS — R41.841 COGNITIVE COMMUNICATION DEFICIT: Primary | ICD-10-CM

## 2024-04-25 DIAGNOSIS — R47.89 WORD FINDING DIFFICULTY: ICD-10-CM

## 2024-04-25 PROCEDURE — 92507 TX SP LANG VOICE COMM INDIV: CPT | Mod: PN

## 2024-04-25 NOTE — PROGRESS NOTES
[facility]  Speech Therapy Treatment Note- Neurological Rehabilitation  Date: 4/30/2024     Name: Diego Gunter   MRN: [id number]   Therapy Diagnosis:   Encounter Diagnoses   Name Primary?    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 Given patients limited progress ST decided to step patient down to x1 every 2 weeks to monitor progress and usage of strategies. Patients wife verbalized understanding and is on board with plan.  He was compliant to home exercise program.   Response to previous treatment: fair  Pain Scale: no pain indicated throughout session  Objective:       UNTIMED  Procedure   Speech- Language- Voice Therapy        Short Term Goals: (8 weeks) Current Progress:   1. Pt will participate in Semantic Feature Analysis for 4-5 nouns per session to address word finding strategies.      Progressing/ Not Met 4/1/2024   SFA initiated. Patient with accurate description of x10 items given minimal cues. He demonstrated some difficulties with initially retrieving name of object. However when given minimal cues patient with great ability to find words.     Met x2   2. Pt will utilize word finding strategies to provide synonyms and antonyms to words with 70% accuracy given moderate cues.      Progressing/ Not Met  4/1/2024   Patient able to locate synonyms and antonyms given 3 choices with 100% accuracy independently.     Choose the word that makes sense- 100% accuracy independently    Goal Met 4/30/2024      3. Patient will participate in Attentive Reading and Constrained Summarization with an adequate summary including no more than 5 general words, extraneous information or pronouns to improve efficiency and topic maintenance in conversation.      Progressing/ Not Met 4/1/2024   Patient completed x5 rounds of ARCS with minimal- moderate cueing.  Patient able to provide adequate synopsis of short paragraph given cues. Patient often forgets information prior to providing synopsis secondary to dementia diagnosis. When he re-reads information patient with improvements.         Met x1   4. Patient will provide reasoning and or a  possible solutions, given a problem scenario (calendar task) in a structured setting with 80% accuracy across 2 sessions.      Progressing/ Not Met 4/1/2024   Patient able to provide reasoning when completing sustained attention tasks with out difficulties    Goal Met 4/30/2024      5. Pt will ID object and pictures named, progressing from FO 2-6 with 90% acc       Progressing/ Not Met 4/1/2024   Patient ID object in F06 with 100% accuracy independently.    However patient with some difficulties finding the word of the given object.      Met x2   6. Patient will participate in word finding strategies and recall 2+ strategies given minimal cueing      Progressing/ Not Met 4/1/2024   Patient able to utilize similar word strategy in conversational speech with out difficulties    Met x1         Long Term Goals: (10 weeks) Current Progress:   1. Patient and family will participate in discussion pertaining to ways to best support patient in his journey with his neurodegenerative disease.    Established this date     2. Pt will develop compensatory techniques to facilitate  receptive and expressive  communication skills for functional communication in home,  social and medical environments     Established this date        Patient Education/Response:   Patient educated regarding the followin. Reviewed information in memory book  2. Metacognitive strategies to aid in picture ID  3. Word finding strategies    Home program established: yes-complete memory book and implement word finding strategies  Patient verbalized understanding to all above education provided.     See Electronic Medical Record under Patient Instructions for exercises provided throughout therapy.  Assessment:   Diego is progressing well towards his goals. Patient was pleasant. Patient seen with some difficulties in word finding when ID objects. ST reviewed word finding strategies in depth and encouraged patient to use the ones reviewed. Patient seen with great improvements. Patient seen with difficulties in ARCS treatment secondary medical dx (dementia). When encouraged to re-read and supplied with minimal-moderate cueing patient seen with great improvements in recall of story. Patient has been stepped down to every other week to continue monitoring patients progress and assist with functional daily problems that may occur.     Patient shared current completion of memory book. Patient seen to have written in his book independently. Patient with great completion. He demonstrated improved ability to find words in conversation and in structured tasks. Patient with great ability to generate synonyms and antonyms independently and given minimal cues. Patient seen with improved abilities to ID objects via tactus therapy marine without difficulties. Current goals remain appropriate. Goals to be updated as necessary.     Patient prognosis is Fair. Patient will continue to benefit from skilled outpatient speech and language therapy to address the deficits listed in the problem list on initial evaluation, provide patient/family education and to maximize  patient's level of independence in the home and community environment.   Medical necessity is demonstrated by the following IMPAIRMENTS:  Aphasia: decreased content words and significant word finding difficulty in all situations severely limiting functional communication with both familiar and unfamiliar communication partners to relay medically and safety relevant information in a timely manner in a state of emergency.    Cognition: Deficits in executive functioning, attention, and memory prevent the pt from relaying medically and safety relevant information in a timely manner in a state of emergency.   Barriers to Therapy: Progressive Neurodegenerative dementia   Patient's spiritual, cultural and educational needs considered and patient agreeable to plan of care and goals.  Plan:   Continue Plan of Care with focus on rehabilitation and compensation for cognitive and language deficits.    Saida Bonilla CCC-SLP   4/30/2024

## 2024-04-29 RX ORDER — ETHAMBUTOL HYDROCHLORIDE 400 MG/1
800 TABLET, FILM COATED ORAL DAILY
Qty: 60 TABLET | Refills: 5 | Status: SHIPPED | OUTPATIENT
Start: 2024-04-29 | End: 2024-06-01 | Stop reason: SDUPTHER

## 2024-04-30 ENCOUNTER — CLINICAL SUPPORT (OUTPATIENT)
Dept: REHABILITATION | Facility: HOSPITAL | Age: 84
End: 2024-04-30
Payer: MEDICARE

## 2024-04-30 DIAGNOSIS — R41.841 COGNITIVE COMMUNICATION DEFICIT: Primary | ICD-10-CM

## 2024-04-30 DIAGNOSIS — Z00.00 ENCOUNTER FOR MEDICARE ANNUAL WELLNESS EXAM: ICD-10-CM

## 2024-04-30 DIAGNOSIS — R47.89 WORD FINDING DIFFICULTY: ICD-10-CM

## 2024-04-30 DIAGNOSIS — R13.10 DYSPHAGIA, UNSPECIFIED TYPE: ICD-10-CM

## 2024-04-30 PROCEDURE — 92507 TX SP LANG VOICE COMM INDIV: CPT | Mod: PN

## 2024-05-03 DIAGNOSIS — A31.0 MAI (MYCOBACTERIUM AVIUM-INTRACELLULARE): ICD-10-CM

## 2024-05-06 ENCOUNTER — PATIENT MESSAGE (OUTPATIENT)
Dept: RESEARCH | Facility: CLINIC | Age: 84
End: 2024-05-06
Payer: MEDICARE

## 2024-05-19 DIAGNOSIS — A31.0 MAI (MYCOBACTERIUM AVIUM-INTRACELLULARE): ICD-10-CM

## 2024-05-20 LAB
ACID FAST MOD KINY STN SPEC: NORMAL
MYCOBACTERIUM SPEC QL CULT: NORMAL

## 2024-05-20 NOTE — PROGRESS NOTES
[facility]  Speech Therapy Treatment Note- Neurological Rehabilitation  Date: 5/22/2024     Name: Diego Gunter   MRN: [id number]   Therapy Diagnosis:   Encounter Diagnoses   Name Primary?    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 He was able to confidently share past weekend events with some difficulty, however compensated well.  He was compliant to home exercise program.   Response to previous treatment: fair  Pain Scale: no pain indicated throughout session  Objective:       UNTIMED  Procedure   Speech- Language- Voice Therapy        Short Term Goals: (8 weeks) Current Progress:   1. Pt will participate in Semantic Feature Analysis for 4-5 nouns per session to address word finding strategies.      Progressing/ Not Met 4/1/2024   SFA initiated. Patient with accurate description of x5 items given minimal cues.     Goal Met 5/22/2024      2. Pt will utilize word finding strategies to provide synonyms and antonyms to words with 70% accuracy given moderate cues.      Progressing/ Not Met 4/1/2024   Patient able to locate synonyms and antonyms given 3 choices with 100% accuracy independently.     Choose the word that makes sense- 100% accuracy independently    Goal Met 5/22/2024      3. Patient will participate in  Attentive Reading and Constrained Summarization with an adequate summary including no more than 5 general words, extraneous information or pronouns to improve efficiency and topic maintenance in conversation.      Progressing/ Not Met 2024   Not addressed in today's session.          Met x2   4. Patient will provide reasoning and or a  possible solutions, given a problem scenario (calendar task) in a structured setting with 80% accuracy across 2 sessions.      Progressing/ Not Met 2024   Patient able to provide reasoning when completing sustained attention tasks with out difficulties    Goal Met 2024      5. Pt will ID object and pictures named, progressing from FO 2-6 with 90% acc       Progressing/ Not Met 2024   Patient ID object in F04 with 100% accuracy independently.  FO6- 100% independently     Patient did not display any word finding difficulties      Goal Met 2024      6. Patient will participate in word finding strategies and recall 2+ strategies given minimal cueing      Progressing/ Not Met 2024   Patient shared recent events he has attended with 2 signs of word finding and was able to continue with story using similar word or coming back to the point later.    Goal Met 2024            Long Term Goals: (10 weeks) Current Progress:   1. Patient and family will participate in discussion pertaining to ways to best support patient in his journey with his neurodegenerative disease.    Goal Met 2024      2. Pt will develop compensatory techniques to facilitate  receptive and expressive communication skills for functional communication in home,  social and medical environments     Goal Met 2024         Patient Education/Response:   Patient educated regarding the followin. Reviewed information in memory book  2. Metacognitive strategies to aid in picture ID  3. Word finding strategies  4. Discussed progress that has been made    Home program established: yes-complete  memory book and implement word finding strategies  Patient verbalized understanding to all above education provided.     See Electronic Medical Record under Patient Instructions for exercises provided throughout therapy.  Assessment:   Deigo has done a great job working towards his goals. Patient was pleasant. Patient seen with some difficulties in word finding sharing past event however compensated exceptionally well. ST reviewed word finding strategies in depth and encouraged patient to continue to use strategies. Patient able to ID objects without difficulties and utilized SFA with no problem given minimal cues. Patient is being d/c from outpatient speech therapy as he is comfortable where he is with communication at this time. ST spoke with patients wife and encouraged her to continue to remind him of strategies and cognitive task such as word search puzzles and scramble. Patient and patients wife verbalized understanding.     Patient prognosis is Fair. Patient no longer benefits from skilled outpatient speech and language therapy to address the deficits listed in the problem list on initial evaluation, provide patient/family education and to maximize patient's level of independence in the home and community environment.   Medical necessity is demonstrated by the following IMPAIRMENTS:  Aphasia: decreased content words and significant word finding difficulty in all situations severely limiting functional communication with both familiar and unfamiliar communication partners to relay medically and safety relevant information in a timely manner in a state of emergency.    Cognition: Deficits in executive functioning, attention, and memory prevent the pt from relaying medically and safety relevant information in a timely manner in a state of emergency.   Barriers to Therapy: Progressive Neurodegenerative dementia   Patient's spiritual, cultural and educational needs considered and patient agreeable to plan of care  and goals.  Plan:   Discontinue Plan of Care with focus on rehabilitation and compensation for cognitive and language deficits.    Saida Bonilla CCC-SLP   5/22/2024

## 2024-05-22 ENCOUNTER — OFFICE VISIT (OUTPATIENT)
Dept: INFECTIOUS DISEASES | Facility: CLINIC | Age: 84
End: 2024-05-22
Payer: MEDICARE

## 2024-05-22 ENCOUNTER — TELEPHONE (OUTPATIENT)
Dept: INFECTIOUS DISEASES | Facility: CLINIC | Age: 84
End: 2024-05-22
Payer: MEDICARE

## 2024-05-22 ENCOUNTER — CLINICAL SUPPORT (OUTPATIENT)
Dept: REHABILITATION | Facility: HOSPITAL | Age: 84
End: 2024-05-22
Payer: MEDICARE

## 2024-05-22 ENCOUNTER — LAB VISIT (OUTPATIENT)
Dept: LAB | Facility: HOSPITAL | Age: 84
End: 2024-05-22
Attending: INTERNAL MEDICINE
Payer: MEDICARE

## 2024-05-22 VITALS
BODY MASS INDEX: 24.17 KG/M2 | DIASTOLIC BLOOD PRESSURE: 66 MMHG | HEART RATE: 50 BPM | HEIGHT: 68 IN | WEIGHT: 159.5 LBS | SYSTOLIC BLOOD PRESSURE: 103 MMHG

## 2024-05-22 DIAGNOSIS — A31.0 MYCOBACTERIUM AVIUM-INTRACELLULARE COMPLEX: Primary | ICD-10-CM

## 2024-05-22 DIAGNOSIS — R41.841 COGNITIVE COMMUNICATION DEFICIT: Primary | ICD-10-CM

## 2024-05-22 DIAGNOSIS — A31.0 MYCOBACTERIUM AVIUM-INTRACELLULARE COMPLEX: ICD-10-CM

## 2024-05-22 DIAGNOSIS — J98.4 CAVITARY LUNG DISEASE: ICD-10-CM

## 2024-05-22 DIAGNOSIS — R13.10 DYSPHAGIA, UNSPECIFIED TYPE: ICD-10-CM

## 2024-05-22 DIAGNOSIS — Z16.29: ICD-10-CM

## 2024-05-22 DIAGNOSIS — R47.89 WORD FINDING DIFFICULTY: ICD-10-CM

## 2024-05-22 LAB
ALBUMIN SERPL BCP-MCNC: 3.8 G/DL (ref 3.5–5.2)
ALP SERPL-CCNC: 66 U/L (ref 55–135)
ALT SERPL W/O P-5'-P-CCNC: 31 U/L (ref 10–44)
ANION GAP SERPL CALC-SCNC: 7 MMOL/L (ref 8–16)
AST SERPL-CCNC: 24 U/L (ref 10–40)
BILIRUB SERPL-MCNC: 0.4 MG/DL (ref 0.1–1)
BUN SERPL-MCNC: 14 MG/DL (ref 8–23)
CALCIUM SERPL-MCNC: 9.3 MG/DL (ref 8.7–10.5)
CHLORIDE SERPL-SCNC: 108 MMOL/L (ref 95–110)
CO2 SERPL-SCNC: 28 MMOL/L (ref 23–29)
CREAT SERPL-MCNC: 1.3 MG/DL (ref 0.5–1.4)
EST. GFR  (NO RACE VARIABLE): 55 ML/MIN/1.73 M^2
GLUCOSE SERPL-MCNC: 85 MG/DL (ref 70–110)
POTASSIUM SERPL-SCNC: 4.3 MMOL/L (ref 3.5–5.1)
PROT SERPL-MCNC: 7 G/DL (ref 6–8.4)
SODIUM SERPL-SCNC: 143 MMOL/L (ref 136–145)

## 2024-05-22 PROCEDURE — 3288F FALL RISK ASSESSMENT DOCD: CPT | Mod: CPTII,S$GLB,, | Performed by: INTERNAL MEDICINE

## 2024-05-22 PROCEDURE — 1126F AMNT PAIN NOTED NONE PRSNT: CPT | Mod: CPTII,S$GLB,, | Performed by: INTERNAL MEDICINE

## 2024-05-22 PROCEDURE — 80053 COMPREHEN METABOLIC PANEL: CPT | Performed by: INTERNAL MEDICINE

## 2024-05-22 PROCEDURE — 1157F ADVNC CARE PLAN IN RCRD: CPT | Mod: CPTII,S$GLB,, | Performed by: INTERNAL MEDICINE

## 2024-05-22 PROCEDURE — 1159F MED LIST DOCD IN RCRD: CPT | Mod: CPTII,S$GLB,, | Performed by: INTERNAL MEDICINE

## 2024-05-22 PROCEDURE — 1101F PT FALLS ASSESS-DOCD LE1/YR: CPT | Mod: CPTII,S$GLB,, | Performed by: INTERNAL MEDICINE

## 2024-05-22 PROCEDURE — 36415 COLL VENOUS BLD VENIPUNCTURE: CPT | Performed by: INTERNAL MEDICINE

## 2024-05-22 PROCEDURE — 99999 PR PBB SHADOW E&M-EST. PATIENT-LVL III: CPT | Mod: PBBFAC,,,

## 2024-05-22 PROCEDURE — 3078F DIAST BP <80 MM HG: CPT | Mod: CPTII,S$GLB,, | Performed by: INTERNAL MEDICINE

## 2024-05-22 PROCEDURE — 92507 TX SP LANG VOICE COMM INDIV: CPT | Mod: PN

## 2024-05-22 PROCEDURE — 99214 OFFICE O/P EST MOD 30 MIN: CPT | Mod: S$GLB,,, | Performed by: INTERNAL MEDICINE

## 2024-05-22 PROCEDURE — 3074F SYST BP LT 130 MM HG: CPT | Mod: CPTII,S$GLB,, | Performed by: INTERNAL MEDICINE

## 2024-05-22 NOTE — TELEPHONE ENCOUNTER
Called pt's wife about medication.      ----- Message from Chata Corcoran sent at 5/22/2024  1:18 PM CDT -----  Regarding: medication  Contact: [phone]  Pt wife calling in requesting call back regarding pt medication  please call to discus Further

## 2024-05-22 NOTE — PLAN OF CARE
Outpatient Therapy Discharge Summary   Discharge Date: 5/22/2024   Name: Diego Gunter  [facility] Number: [id number]  Therapy Diagnosis:   Encounter Diagnoses   Name Primary?    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 Patient was pleasant. Patient seen with some difficulties in word finding sharing past event however compensated exceptionally well. ST reviewed word finding strategies in depth and encouraged patient to continue to use strategies. Patient able to ID objects without difficulties and utilized SFA with no problem given minimal cues. Patient is being d/c from outpatient speech therapy as he is comfortable where he is with communication at this time. ST spoke with patients wife and encouraged her to continue to remind him of strategies and cognitive task such as word search puzzles and scramble. Patient and patients wife verbalized understanding.      Goals:   Short Term Goals: (8 weeks) Current Progress:   1. Pt will participate in Semantic Feature Analysis for 4-5 nouns per session to address word finding strategies.      Progressing/ Not Met 4/1/2024   SFA initiated. Patient with accurate description of x5 items given minimal cues.      Goal Met 5/22/2024       2. Pt will utilize word finding strategies to provide synonyms and antonyms to words with 70% accuracy given moderate cues.      Progressing/ Not Met 4/1/2024   Patient able to locate synonyms and antonyms given 3 choices with 100% accuracy independently.       Choose the word that makes sense- 100% accuracy independently     Goal Met 5/22/2024       3. Patient will participate in Attentive Reading and Constrained Summarization with an adequate summary including no more than 5 general words, extraneous information or pronouns to improve efficiency and topic maintenance in conversation.      Progressing/ Not Met 4/1/2024   Not addressed in today's session.              Met x2   4. Patient will provide reasoning and or a  possible solutions, given a problem scenario (calendar task) in a structured setting with 80% accuracy across 2 sessions.      Progressing/ Not Met 4/1/2024   Patient able to provide reasoning when completing sustained attention tasks with out difficulties     Goal Met 5/22/2024       5. Pt will ID object and pictures named, progressing from FO 2-6 with 90% acc       Progressing/ Not Met 4/1/2024   Patient ID object in F04 with 100% accuracy independently.  FO6- 100% independently      Patient did not display any word finding difficulties        Goal Met 5/22/2024       6. Patient will participate in word finding strategies and recall 2+ strategies given minimal cueing      Progressing/ Not Met 4/1/2024   Patient shared recent events he has attended with 2 signs of word finding and was able to continue with story using similar word or coming back to the point later.     Goal Met 5/22/2024             Long Term Goals: (10 weeks) Current Progress:   1. Patient and family will participate in discussion pertaining to ways to best support patient in his journey with his neurodegenerative disease.    Goal Met 5/22/2024       2. Pt will develop compensatory techniques to facilitate  receptive and expressive communication skills for functional communication in home,  social and medical environments     Goal Met 5/22/2024            Discharge reason: Patient has reached the maximum rehab potential for the present time    Plan   This patient is discharged from  Speech Therapy    Saida Bonilla, THERESE-SLP   5/22/2024

## 2024-05-23 NOTE — PROGRESS NOTES
INFECTIOUS DISEASE CLINIC  5/22/24  Subjective:      Chief Complaint:   Pulmonary MAC follow up     History of Present Illness:    Patient Diego Gunter is a 83 y.o. male  with h/o emphysema and cavitary MAC, alzheimers, here for MAC follow up. Denies complaints. Stable productive cough with some sputum production. Reports taking and tolerating clofaz/ethambutol/arikayce. Running low, but not out.  Compliant with aerobika and inhaled hypertonic saline.         Organism     MYCOBACTERIUM AVIUM COMPLEX   Antibiotic                   TED (mcg/mL)  Interpretation   ----------------------------------------------------------   Clofazimine                          0.12   Moxifloxacin                            2       I   Clarithromycin                        >64       R   Amikacin (IV)                          64       R   Amikacin (liposomal, inhaled)          64       S   Linezolid                              32       R       4/17/23  Organism     MYCOBACTERIUM AVIUM COMPLEX   Antibiotic                   TED (mcg/mL)  Interpretation   ----------------------------------------------------------   Clofazimine                          0.12   Moxifloxacin                            2       I   Clarithromycin                        >64       R   Amikacin (IV)                          32       I   Amikacin (liposomal, inhaled)          32       S   Linezolid                              32       R                  Takes prilosec for reflux.     Quit smoking 57 y ago.     Had bronch 10/2018 for work up of lung mass     BRONCHIAL WASH CYTOLOGY:  NEGATIVE EXAM-NO NEOPLASIA IDENTIFIED  NO ORGANISMS IDENTIFIED WITH A GMS STAIN  THE CONTROL STAINED APPROPRIATEL              Review of Symptoms:  Constitutional: Denies fevers, chills, or weakness.  ENT: Denies dysphagia, nasal discharge, ear pain or discharge.  Cardiovascular: Denies chest pain, palpitations, orthopnea, or claudication.  Respiratory: Denies shortness of breath,  cough, hemoptysis, or wheezing.  GI: Denies nausea/vomitting, hematochezia, melena, abd pain, or changes in appetite.  : Denies dysuria, incontinence, or hematuria.  Musculoskeletal: Denies joint pain or myalgias.  Skin/breast: Denies rashes, lumps, lesions, or discharge.  Neurologic: Denies headache, dizziness, vertigo, or paresthesias.    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 Dr. Pham    Hx of colonic polyps     followed by GI. Dr. Pham    Hyperlipidemia associated with type 2 diabetes mellitus     Hyperlipidemia LDL goal <100     Hypernatremia 05/05/2022    Hypertension associated with type 2 diabetes mellitus     Hypertension associated with type 2 diabetes mellitus     Hypotension     Intermittent confusion 04/29/2022    Overweight(278.02)     Prostate cancer 09/2011    followed by urology, Dr. Rogers    Pulmonary embolism     Reducible right inguinal hernia 10/10/2022    Right hemiparesis 07/22/2022    Secondary to cervical hematoma    Type II or unspecified type diabetes mellitus without mention of complication, not stated as uncontrolled     diet controlled    Urinary retention 04/29/2022       Past Surgical History:   Procedure Laterality Date    BRONCHOSCOPY N/A 10/15/2018    Procedure: BRONCHOSCOPY;  Surgeon: Pratibha Diagnostic Provider;  Location: [address];  Service: Anesthesiology;  Laterality: N/A;    CARPAL TUNNEL RELEASE Right 1/11/2023    Procedure: RELEASE, CARPAL TUNNEL;  Surgeon: Romero Lazo MD;  Location: [facility] OR;  Service: Orthopedics;   Laterality: Right;    CATARACT EXTRACTION, BILATERAL  2014    COLONOSCOPY N/A 5/16/2023    Procedure: COLONOSCOPY;  Surgeon: Buster Sequeira MD;  Location: [facility] ENDO (4TH FLR);  Service: Endoscopy;  Laterality: N/A;    DECOMPRESSION, NERVE, ULNAR Right 1/11/2023    Procedure: DECOMPRESSION, NERVE, ULNAR - right cub jolie and guyon's decompression as well as open right carpal tunnel release;  Surgeon: Romero Lazo MD;  Location: [facility];  Service: Orthopedics;  Laterality: Right;    ESOPHAGOGASTRODUODENOSCOPY N/A 5/16/2023    Procedure: EGD (ESOPHAGOGASTRODUODENOSCOPY);  Surgeon: Buster Sequeira MD;  Location: [facility] (4TH FLR);  Service: Endoscopy;  Laterality: N/A;  inst mailed-RB  5/10/23 no answer for precall/mleone lpn    POSTERIOR CERVICAL LAMINECTOMY Bilateral 4/28/2022    Procedure: LAMINECTOMY, SPINE, CERVICAL, POSTERIOR APPROACH C3-6;  Surgeon: Carlos Miller MD;  Location: [facility] OR 2ND FLR;  Service: Neurosurgery;  Laterality: Bilateral;    PROSTATECTOMY      REPAIR, HERNIA, INGUINAL, WITHOUT HISTORY OF PRIOR REPAIR, AGE 5 YEARS OR OLDER Right 10/10/2022    Procedure: REPAIR, HERNIA, INGUINAL, WITHOUT HISTORY OF PRIOR REPAIR, AGE 5 YEARS OR OLDER;  Surgeon: Dario Esquivel MD;  Location: [facility] OR 2ND FLR;  Service: General;  Laterality: Right;  open right inguinal hernia repair with mesh       Family History   Problem Relation Name Age of Onset    Colon cancer Mother      Diabetes Mother      Heart disease Father      Mental illness Sister Rashida     Diabetes Sister Rashida     Other Brother Alba         polio as a child    Pulmonary fibrosis Brother Mayito     Diabetes Brother Benito     Myasthenia gravis Brother Benito     Parkinsonism Brother Fabio     Diabetes Brother Fabio     Dementia Brother Fabio     Lung cancer Brother Jennifer         smoker    Diabetes Maternal Aunt      Diabetes Other      Esophageal cancer Neg Hx         Social History     Socioeconomic History    Marital status:  "     Spouse name: Addis    Number of children: 2   Occupational History    Occupation: tool    Tobacco Use    Smoking status: Former     Current packs/day: 0.00     Average packs/day: 0.3 packs/day for 5.0 years (1.3 ttl pk-yrs)     Types: Cigarettes     Start date: 10/2/1957     Quit date: 10/2/1962     Years since quittin.6    Smokeless tobacco: Former    Tobacco comments:     quit age 23   Substance and Sexual Activity    Alcohol use: Not Currently    Drug use: No    Sexual activity: Yes     Partners: Female     Social Determinants of Health     Financial Resource Strain: Low Risk  (2023)    Overall Financial Resource Strain (CARDIA)     Difficulty of Paying Living Expenses: Not hard at all   Food Insecurity: No Food Insecurity (2023)    Hunger Vital Sign     Worried About Running Out of Food in the Last Year: Never true     Ran Out of Food in the Last Year: Never true   Transportation Needs: No Transportation Needs (2023)    PRAPARE - Transportation     Lack of Transportation (Medical): No     Lack of Transportation (Non-Medical): No   Physical Activity: Insufficiently Active (2023)    Exercise Vital Sign     Days of Exercise per Week: 1 day     Minutes of Exercise per Session: 10 min   Stress: No Stress Concern Present (2023)    Citizen of [place] [employer] - Occupational Stress Questionnaire     Feeling of Stress : Not at all   Housing Stability: Unknown (2023)    Housing Stability Vital Sign     Unable to Pay for Housing in the Last Year: No     Unstable Housing in the Last Year: No       Review of patient's allergies indicates:  No Known Allergies      Objective:   /66   Pulse (!) 50   Ht 5' 8" (1.727 m)   Wt 72.3 kg (159 lb 8 oz)   BMI 24.25 kg/m²     General: Afebrile, alert, comfortable, no acute distress.   HEENT: TERELL. EOMI, no scleral icterus.   Pulmonary: Non labored,clear to auscultation A/P/L. No wheezing, crackles, or " rhonchi.  Cardiac: normal S1 & S2 w/o rubs/murmurs/gallops.   Abdominal: Non-tender, non-distended.  Extremities: Moves all extremities x 4.   Skin: No jaundice, rashes, or visible lesions.   Neurological:  Alert and oriented x 4.     Labs:    Glucose   Date Value Ref Range Status   05/22/2024 85 70 - 110 mg/dL Final   02/21/2024 87 70 - 110 mg/dL Final   11/24/2023 75 70 - 110 mg/dL Final       Calcium   Date Value Ref Range Status   05/22/2024 9.3 8.7 - 10.5 mg/dL Final   02/21/2024 9.4 8.7 - 10.5 mg/dL Final   11/24/2023 9.2 8.7 - 10.5 mg/dL Final       Albumin   Date Value Ref Range Status   05/22/2024 3.8 3.5 - 5.2 g/dL Final   02/21/2024 4.2 3.5 - 5.2 g/dL Final   11/24/2023 3.8 3.5 - 5.2 g/dL Final       Total Protein   Date Value Ref Range Status   05/22/2024 7.0 6.0 - 8.4 g/dL Final   02/21/2024 7.6 6.0 - 8.4 g/dL Final   11/24/2023 7.1 6.0 - 8.4 g/dL Final       Sodium   Date Value Ref Range Status   05/22/2024 143 136 - 145 mmol/L Final   02/21/2024 141 136 - 145 mmol/L Final   11/24/2023 145 136 - 145 mmol/L Final       Potassium   Date Value Ref Range Status   05/22/2024 4.3 3.5 - 5.1 mmol/L Final   02/21/2024 4.5 3.5 - 5.1 mmol/L Final   11/24/2023 4.1 3.5 - 5.1 mmol/L Final       CO2   Date Value Ref Range Status   05/22/2024 28 23 - 29 mmol/L Final   02/21/2024 25 23 - 29 mmol/L Final   11/24/2023 28 23 - 29 mmol/L Final       Chloride   Date Value Ref Range Status   05/22/2024 108 95 - 110 mmol/L Final   02/21/2024 105 95 - 110 mmol/L Final   11/24/2023 108 95 - 110 mmol/L Final       BUN   Date Value Ref Range Status   05/22/2024 14 8 - 23 mg/dL Final   02/21/2024 10 8 - 23 mg/dL Final   11/24/2023 12 8 - 23 mg/dL Final       Creatinine   Date Value Ref Range Status   05/22/2024 1.3 0.5 - 1.4 mg/dL Final   02/21/2024 1.1 0.5 - 1.4 mg/dL Final   11/24/2023 0.9 0.5 - 1.4 mg/dL Final       Alkaline Phosphatase   Date Value Ref Range Status   05/22/2024 66 55 - 135 U/L Final   02/21/2024 87 55 - 135  U/L Final   11/24/2023 71 55 - 135 U/L Final       ALT   Date Value Ref Range Status   05/22/2024 31 10 - 44 U/L Final   02/21/2024 36 10 - 44 U/L Final   11/24/2023 16 10 - 44 U/L Final       AST   Date Value Ref Range Status   05/22/2024 24 10 - 40 U/L Final   02/21/2024 36 10 - 40 U/L Final   11/24/2023 21 10 - 40 U/L Final       Total Bilirubin   Date Value Ref Range Status   05/22/2024 0.4 0.1 - 1.0 mg/dL Final     Comment:     For infants and newborns, interpretation of results should be based  on gestational age, weight and in agreement with clinical  observations.    Premature Infant recommended reference ranges:  Up to 24 hours.............<8.0 mg/dL  Up to 48 hours............<12.0 mg/dL  3-5 days..................<15.0 mg/dL  6-29 days.................<15.0 mg/dL     02/21/2024 0.5 0.1 - 1.0 mg/dL Final     Comment:     For infants and newborns, interpretation of results should be based  on gestational age, weight and in agreement with clinical  observations.    Premature Infant recommended reference ranges:  Up to 24 hours.............<8.0 mg/dL  Up to 48 hours............<12.0 mg/dL  3-5 days..................<15.0 mg/dL  6-29 days.................<15.0 mg/dL     11/24/2023 0.4 0.1 - 1.0 mg/dL Final     Comment:     For infants and newborns, interpretation of results should be based  on gestational age, weight and in agreement with clinical  observations.    Premature Infant recommended reference ranges:  Up to 24 hours.............<8.0 mg/dL  Up to 48 hours............<12.0 mg/dL  3-5 days..................<15.0 mg/dL  6-29 days.................<15.0 mg/dL         WBC   Date Value Ref Range Status   11/24/2023 6.77 3.90 - 12.70 K/uL Final   11/04/2022 6.00 3.90 - 12.70 K/uL Final   10/21/2022 6.65 3.90 - 12.70 K/uL Final       Hemoglobin   Date Value Ref Range Status   11/24/2023 13.1 (L) 14.0 - 18.0 g/dL Final   11/04/2022 10.1 (L) 14.0 - 18.0 g/dL Final   10/21/2022 9.7 (L) 14.0 - 18.0 g/dL Final  "      POC Hematocrit   Date Value Ref Range Status   04/28/2022 34 (L) 36 - 54 %PCV Final   04/28/2022 42 36 - 54 %PCV Final     Hematocrit   Date Value Ref Range Status   11/24/2023 41.1 40.0 - 54.0 % Final   11/04/2022 31.2 (L) 40.0 - 54.0 % Final   10/21/2022 30.0 (L) 40.0 - 54.0 % Final       MCV   Date Value Ref Range Status   11/24/2023 88 82 - 98 fL Final   11/04/2022 86 82 - 98 fL Final   10/21/2022 84 82 - 98 fL Final       Platelets   Date Value Ref Range Status   11/24/2023 258 150 - 450 K/uL Final   11/04/2022 340 150 - 450 K/uL Final   10/21/2022 396 150 - 450 K/uL Final       Lab Results   Component Value Date    CHOL 131 03/20/2024    CHOL 135 03/08/2023    CHOL 116 (L) 04/28/2022       Lab Results   Component Value Date    HDL 38 (L) 03/20/2024    HDL 40 03/08/2023    HDL 36 (L) 04/28/2022       Lab Results   Component Value Date    LDLCALC 73.0 03/20/2024    LDLCALC 70.8 03/08/2023    LDLCALC 62.4 (L) 04/28/2022       Lab Results   Component Value Date    TRIG 100 03/20/2024    TRIG 121 03/08/2023    TRIG 88 04/28/2022       Lab Results   Component Value Date    CHOLHDL 29.0 03/20/2024    CHOLHDL 29.6 03/08/2023    CHOLHDL 31.0 04/28/2022       RPR   Date Value Ref Range Status   12/07/2023 Non-reactive Non-reactive Final   05/08/2022 Non-reactive Non-reactive Final   10/21/2019 Non-reactive Non-reactive Final     No results found for: "QUANTIFERON"    Medications:  Current Outpatient Medications on File Prior to Visit   Medication Sig Dispense Refill    albuterol (PROAIR HFA) 90 mcg/actuation inhaler Inhale 1 puff into the lungs daily as needed for Wheezing (30 min prior to arikacye). Rescue 18 g 6    ARIKAYCE 590 mg/8.4 mL NbSp       aspirin (ECOTRIN) 81 MG EC tablet Take 81 mg by mouth once daily.      atorvastatin (LIPITOR) 10 MG tablet Take 10 mg by mouth every evening.      atorvastatin (LIPITOR) 40 MG tablet Take 40 mg by mouth every evening.      azelastine (ASTELIN) 137 mcg (0.1 %) nasal " "spray 2 sprays (274 mcg total) by Nasal route 2 (two) times daily. 30 mL 0    b complex vitamins capsule Take 1 capsule by mouth once daily.      COMIRNATY 2023-24, 12Y UP,,PF, 30 mcg/0.3 mL inection       donepeziL (ARICEPT) 10 MG tablet TAKE 1 TABLET (10 MG TOTAL) BY MOUTH ONCE DAILY 30 tablet 4    EScitalopram oxalate (LEXAPRO) 10 MG tablet Take 1.5 tablets (15 mg total) by mouth once daily. 145 tablet 1    ethambutoL (MYAMBUTOL) 400 MG Tab Take 2 tablets (800 mg total) by mouth once daily. 60 tablet 5    FLUZONE HIGHDOSE QUAD 23-24  mcg/0.7 mL Syrg       INV clofazimine capsule Take 2 capsules (100 mg total) by mouth once daily with meals. For Investigational Use Only. Patient Study Req ID# [id number]. Protocol: [id number] 200 capsule 0    memantine (NAMENDA) 5 MG Tab Take 1 tablet (5 mg total) by mouth 2 (two) times daily. Initially 5mg daily for 1 week, and then increased to 5mg twice a day. 60 tablet 11    nebulizer and compressor ([employer] COMPRESSOR SYSTEM) Marcy use to administer nebulized medication as directed 1 each 0    pantoprazole (PROTONIX) 40 MG tablet TAKE 1 TABLET(40 MG) BY MOUTH EVERY DAY 30 MINUTES BEFORE BREAKFAST 90 tablet 1    pravastatin (PRAVACHOL) 10 MG tablet Take 1 tablet (10 mg total) by mouth every evening. 90 tablet 1    sodium chloride 3% 3 % nebulizer solution USE 4 ML VIAL IN NEBULIZER  TWICE DAILY 480 mL 11    sodium chloride 7% 7 % nebulizer solution use one vial in nebulizer twice a day 500 mL 11    [DISCONTINUED] omega-3 fatty acids 1,000 mg Cap Take 2 g by mouth.       No current facility-administered medications on file prior to visit.       Antibiotics:   Antibiotics (From admission, onward)      None            HIV: No components found for: "HIV 1/2 AG/AB"  Hepatitis C IgG: No components found for: "HEPATITIS C"  Syphilis:   RPR   Date Value Ref Range Status   12/07/2023 Non-reactive Non-reactive Final   05/08/2022 Non-reactive Non-reactive Final   10/21/2019 " "Non-reactive Non-reactive Final       Hepatitis A IgG: No components found for: "HEPATITIS A IGG"  Hepatitis Bc IgG: No components found for: "HEPATITIS B CORE IGG"  Hepatitis Bs IgG:  Quantiferon: No results found for: "QUANTIFERON"  VZV IgG: No components found for: "VARICELLA IGG"    No components found for: "SEDIMENTATION RATE"  No components found for: "C-REACTIVE PROTEIN"      Microbiology x 7d:   Microbiology Results (last 7 days)       ** No results found for the last 168 hours. **            Immunization History   Administered Date(s) Administered    COVID-19, MRNA, LN-S, PF (Pfizer) (Purple Cap) 01/13/2021, 04/28/2021, 11/05/2021    Influenza 10/19/2022    Influenza (FLUAD) - Trivalent - Adjuvanted - PF (65+) 09/25/2019    Influenza - High Dose - PF (65 years and older) 09/18/2015, 10/04/2016, 10/23/2017, 09/21/2018    Influenza - Quadrivalent - High Dose - PF (65 years and older) 11/05/2021, 10/18/2022, 10/03/2023    Influenza - Quadrivalent - PF *Preferred* (6 months and older) 10/06/2014    Influenza A (H1N1) 2009 Monovalent - IM 01/22/2010    Influenza Split 10/10/2011, 10/06/2014    Pneumococcal Conjugate - 13 Valent 10/28/2015    Pneumococcal Polysaccharide - 23 Valent 07/12/2013    Tdap 03/08/2015    Zoster Recombinant 09/25/2019, 12/30/2019         Reviewed records today as well as relevant labs, cultures, and imaging    Assessment:     Pulmonary MAC, macrolide resistant   Cavitary lung lesion  Nodular liver- noted on CT; US abdomen 10/2021 No compelling sonographic evidence of cirrhosis.  empysema  Antiphospholipid antibody syndrome; h/o coumadin, paused after hematoma       83M with emphysema, macrolide resistant cavitary pulm MAC  symptoms stable (minimal cough, weight stable). CT chest with stable cavitary lung lesion. sputum cultures persistently positive for MAC.     Unfortunately he is resistant to azithromycin and iv amikacin.  I can not get omadacycline or tedezolid or bedqaqueline because " "insurance wont' cover ("not FDA approved for MAC") and cost too high to pay out of pocket    e-consult to [facility]   They recommended adult day unit at [facility] to get an evaluation by their cardiothoracic surgeon (to see if they can offer you surgical resection of your infection). If you are able to travel to [place] and interested in this, please let me know and I'll call the physician line to get you referred. This is a different program than their regular 10 day program.      They also recommended continuing clofazamine, ethambutol, rifampin, and inhaled arikayce (and tezolid and bedaquiline if not cost prohibitive).          Risk factor-   structural: emphysema, bronchiectasis  functional: reflux        Qtc 407 on 3/8/23    The patient has numerous risk factors for disease progression including male gender, older age,  fibrocavitary disease, macrolide resistance, bronchiectasis. pulm disease from macrolide resistent mac has low cure rate independnetly of these other factors.    Goal of treatment is to prevent progression and not cure; not clear that pt is interested in surgery for removal of pulmonary cavitary lesion    No toxicities from antimicrobials  Extremely medically complex  Patient is high risk for infectious complications given medical comorbidities    Plan:     Continue clofazamine, ethambutol, arikayce. Couseled on side effects    Had requested a refill of clofaz and it's waiting for him to  at main campus    While on inhaled amikacin- Dysphonia can happen in 1st month (warm water gargle). Pre-treat with albuteral 30min before inhaled amikacin    While on ethambutol- self vision screens daily. If blurry vision lasts 2 days in a row, patient known to stop ethambutol and call eye doctor for exam and to let us know that this happened.    Stressed importance of airway clearance. continue Aerobika and nebulized hypertonic saline bid.      Prevent reflux- avoid stomach " sleeping. Famotidine/tums. Stop liquids 3hr before bedtime     Monitor afb sputum monthly     - Will monitor drug therapy for toxicity    Reviewed ct scan, stable. Pt interested in monitoring ~ q6 mo      I have sent communication to the referring physician and/or primary care provider.     I spent a total of 20 minutes on the day of the visit. This includes face to face time and non-face to face time preparing to see the patient (eg, review of tests), obtaining and/or reviewing separately obtained history, documenting clinical information in the electronic or other health record, independently interpreting results, and communicating results to the patient/family/caregiver, or care coordination.      Paola Rao MD, MPH  Infectious Disease      Orders Placed This Encounter   Procedures    Comprehensive Metabolic Panel     Standing Status:   Future     Number of Occurrences:   1     Standing Expiration Date:   5/22/2025

## 2024-06-03 RX ORDER — ETHAMBUTOL HYDROCHLORIDE 400 MG/1
800 TABLET, FILM COATED ORAL DAILY
Qty: 60 TABLET | Refills: 5 | Status: SHIPPED | OUTPATIENT
Start: 2024-06-03 | End: 2025-06-03

## 2024-06-20 ENCOUNTER — LAB VISIT (OUTPATIENT)
Dept: LAB | Facility: HOSPITAL | Age: 84
End: 2024-06-20
Attending: INTERNAL MEDICINE
Payer: MEDICARE

## 2024-06-20 DIAGNOSIS — A31.0 MYCOBACTERIUM AVIUM-INTRACELLULARE COMPLEX: ICD-10-CM

## 2024-06-20 PROCEDURE — 87116 MYCOBACTERIA CULTURE: CPT | Performed by: INTERNAL MEDICINE

## 2024-06-20 PROCEDURE — 87206 SMEAR FLUORESCENT/ACID STAI: CPT | Performed by: INTERNAL MEDICINE

## 2024-06-20 PROCEDURE — 87015 SPECIMEN INFECT AGNT CONCNTJ: CPT | Performed by: INTERNAL MEDICINE

## 2024-06-21 LAB
ACID FAST MOD KINY STN SPEC: NORMAL
MYCOBACTERIUM SPEC QL CULT: NORMAL

## 2024-07-01 RX ORDER — ETHAMBUTOL HYDROCHLORIDE 400 MG/1
800 TABLET, FILM COATED ORAL DAILY
Qty: 60 TABLET | Refills: 5 | Status: SHIPPED | OUTPATIENT
Start: 2024-07-01 | End: 2025-07-01

## 2024-07-08 ENCOUNTER — TELEPHONE (OUTPATIENT)
Dept: INFECTIOUS DISEASES | Facility: CLINIC | Age: 84
End: 2024-07-08
Payer: MEDICARE

## 2024-07-08 ENCOUNTER — PATIENT MESSAGE (OUTPATIENT)
Dept: NEUROLOGY | Facility: CLINIC | Age: 84
End: 2024-07-08
Payer: MEDICARE

## 2024-07-08 ENCOUNTER — TELEPHONE (OUTPATIENT)
Dept: OTOLARYNGOLOGY | Facility: CLINIC | Age: 84
End: 2024-07-08
Payer: MEDICARE

## 2024-07-08 NOTE — TELEPHONE ENCOUNTER
----- Message from Tracey Rivera sent at 7/8/2024  1:51 PM CDT -----  Needs advice from nurse:      Who Called:Addis Gunter-wife  Regarding:would like to have just a hearing test to see if medication is causing ringing in ears  Would the patient rather a call back or [facility]?  Best Call Back number:[phone]  Additional Info:

## 2024-07-08 NOTE — TELEPHONE ENCOUNTER
Called pt and booked appt.    ----- Message from Paola Rao MD sent at 7/8/2024  1:47 PM CDT -----  Can you please schedule f/u on [facility] next time I'm there. Ideally 1-2 months. thanks

## 2024-07-08 NOTE — TELEPHONE ENCOUNTER
Returned call. Spoke with patient and his wife    Pt reports ringing in ears and seeing bugs flying around that aren't there    [place] could be related to arikacye    Pause arikayce. Repeat audiology screen. Will request clinic f/u      ----- Message from Corrina Kasper MA sent at 7/8/2024 12:54 PM CDT -----  Regarding: FW: pt advice  Contact: [phone]    ----- Message -----  From: Snehal Ward  Sent: 7/8/2024  12:25 PM CDT  To: Maira Guevara Staff  Subject: pt advice                                        Pt wife Addis is calling to speak with someone in provider office in regards to pt seeing waves in front of his eyes Addis stated pt has been seeing these waves but this symptom is getting worst Addis would like to know if this has to do with his ARIKAYCE 590 mg/8.4 mL NbSp medication the provider gave the pt Addis  is asking for a return call please call her at  [phone]

## 2024-07-08 NOTE — TELEPHONE ENCOUNTER
Called and spoke with patients wife. He has been scheduled for tomorrow at 10:30. Patients wife accepted and was very grateful.

## 2024-07-09 ENCOUNTER — CLINICAL SUPPORT (OUTPATIENT)
Dept: OTOLARYNGOLOGY | Facility: CLINIC | Age: 84
End: 2024-07-09
Payer: MEDICARE

## 2024-07-09 DIAGNOSIS — E78.5 HYPERLIPIDEMIA LDL GOAL <100: ICD-10-CM

## 2024-07-09 DIAGNOSIS — H90.3 SENSORINEURAL HEARING LOSS (SNHL) OF BOTH EARS: ICD-10-CM

## 2024-07-09 DIAGNOSIS — H93.13 TINNITUS OF BOTH EARS: Primary | ICD-10-CM

## 2024-07-09 PROCEDURE — 92567 TYMPANOMETRY: CPT | Mod: S$GLB,,,

## 2024-07-09 PROCEDURE — 92557 COMPREHENSIVE HEARING TEST: CPT | Mod: S$GLB,,,

## 2024-07-09 RX ORDER — PRAVASTATIN SODIUM 10 MG/1
10 TABLET ORAL NIGHTLY
Qty: 90 TABLET | Refills: 1 | Status: SHIPPED | OUTPATIENT
Start: 2024-07-09

## 2024-07-09 NOTE — Clinical Note
Good Morning,  Mr. Gunter was seen for hearing evaluation this morning. Results revealed no changes in hearing thresholds from 4-12-24 evaluation. Please let me know if you have any additional concerns.

## 2024-07-09 NOTE — TELEPHONE ENCOUNTER
Refill Decision Note   Diego Gunter  is requesting a refill authorization.  Brief Assessment and Rationale for Refill:  Approve     Medication Therapy Plan:         Comments:     Note composed:5:46 PM 07/09/2024

## 2024-07-09 NOTE — PROGRESS NOTES
Diego Gunter, a 83 y.o. male was seen today in the clinic for an audiologic evaluation. The patient's primary complaint was increased perception of tinnitus. Referring provider, Dr. Rao, requested hearing evaluation due to possible side effects of Arikacye. Mr. Gunter was last seen in audiology on 4-12-24 with testing revealing mild sloping to severe sensorineural hearing loss. During that appointment, Mr. Gunter reported experiencing tinnitus in both ears. Mr. Gunter wears two ABIOLA hearing aids.    Pure tone testing revealed mild sloping to severe sensorineural hearing loss, bilaterally. Speech reception thresholds were obtained at 45 dBHL in the right ear and 50 dBHL in the left ear. Speech discrimination scores were 80% in the right ear and 80% in the left ear. Tympanometry revealed Type A tympanograms in both ears.     No changes in hearing thresholds or speech discrimination scores were noted in today's evaluation.    Recommendations:  Otologic evaluation  Audiologic evaluation per provider recommendation / annually  Hearing protection in noise  Continue use of hearing aids

## 2024-07-09 NOTE — TELEPHONE ENCOUNTER
No care due was identified.  [facility] Embedded Care Due Messages. Reference number: [id number].   7/09/2024 10:52:59 AM CDT

## 2024-07-12 RX ORDER — PANTOPRAZOLE SODIUM 40 MG/1
TABLET, DELAYED RELEASE ORAL
Qty: 90 TABLET | Refills: 1 | Status: SHIPPED | OUTPATIENT
Start: 2024-07-12

## 2024-07-12 NOTE — TELEPHONE ENCOUNTER
Refill Decision Note   Diego Gunter  is requesting a refill authorization.  Brief Assessment and Rationale for Refill:  Approve     Medication Therapy Plan:         Comments:     Note composed:1:16 PM 07/12/2024

## 2024-07-12 NOTE — TELEPHONE ENCOUNTER
No care due was identified.  [facility] Embedded Care Due Messages. Reference number: [id number].   7/12/2024 8:12:50 AM CDT

## 2024-07-17 NOTE — PROGRESS NOTES
INR not at goal. Medications, chart, and patient findings reviewed. See calendar for adjustments to dose and follow up plan.         Quinolones Pregnancy And Lactation Text: This medication is Pregnancy Category C and it isn't know if it is safe during pregnancy. It is also excreted in breast milk. Humira Counseling:  I discussed with the patient the risks of adalimumab including but not limited to myelosuppression, immunosuppression, autoimmune hepatitis, demyelinating diseases, lymphoma, and serious infections.  The patient understands that monitoring is required including a PPD at baseline and must alert us or the primary physician if symptoms of infection or other concerning signs are noted. Opioid Counseling: I discussed with the patient the potential side effects of opioids including but not limited to addiction, altered mental status, and depression. I stressed avoiding alcohol, benzodiazepines, muscle relaxants and sleep aids unless specifically okayed by a physician. The patient verbalized understanding of the proper use and possible adverse effects of opioids. All of the patient's questions and concerns were addressed. They were instructed to flush the remaining pills down the toilet if they did not need them for pain. Odomzo Pregnancy And Lactation Text: This medication is Pregnancy Category X and is absolutely contraindicated during pregnancy. It is unknown if it is excreted in breast milk. Doxycycline Counseling:  Patient counseled regarding possible photosensitivity and increased risk for sunburn.  Patient instructed to avoid sunlight, if possible.  When exposed to sunlight, patients should wear protective clothing, sunglasses, and sunscreen.  The patient was instructed to call the office immediately if the following severe adverse effects occur:  hearing changes, easy bruising/bleeding, severe headache, or vision changes.  The patient verbalized understanding of the proper use and possible adverse effects of doxycycline.  All of the patient's questions and concerns were addressed. Rinvoq Pregnancy And Lactation Text: Based on animal studies, Rinvoq may cause embryo-fetal harm when administered to pregnant women.  The medication should not be used in pregnancy.  Breastfeeding is not recommended during treatment and for 6 days after the last dose. Siliq Pregnancy And Lactation Text: The risk during pregnancy and breastfeeding is uncertain with this medication. Griseofulvin Counseling:  I discussed with the patient the risks of griseofulvin including but not limited to photosensitivity, cytopenia, liver damage, nausea/vomiting and severe allergy.  The patient understands that this medication is best absorbed when taken with a fatty meal (e.g., ice cream or french fries). Low Dose Naltrexone Pregnancy And Lactation Text: Naltrexone is pregnancy category C.  There have been no adequate and well-controlled studies in pregnant women.  It should be used in pregnancy only if the potential benefit justifies the potential risk to the fetus.   Limited data indicates that naltrexone is minimally excreted into breastmilk. Tremfya Counseling: I discussed with the patient the risks of guselkumab including but not limited to immunosuppression, serious infections, and drug reactions.  The patient understands that monitoring is required including a PPD at baseline and must alert us or the primary physician if symptoms of infection or other concerning signs are noted. Methotrexate Counseling:  Patient counseled regarding adverse effects of methotrexate including but not limited to nausea, vomiting, abnormalities in liver function tests. Patients may develop mouth sores, rash, diarrhea, and abnormalities in blood counts. The patient understands that monitoring is required including LFT's and blood counts.  There is a rare possibility of scarring of the liver and lung problems that can occur when taking methotrexate. Persistent nausea, loss of appetite, pale stools, dark urine, cough, and shortness of breath should be reported immediately. Patient advised to discontinue methotrexate treatment at least three months before attempting to become pregnant.  I discussed the need for folate supplements while taking methotrexate.  These supplements can decrease side effects during methotrexate treatment. The patient verbalized understanding of the proper use and possible adverse effects of methotrexate.  All of the patient's questions and concerns were addressed. Wartpeel Pregnancy And Lactation Text: This medication is Pregnancy Category X and contraindicated in pregnancy and in women who may become pregnant. It is unknown if this medication is excreted in breast milk. Azelaic Acid Counseling: Patient counseled that medicine may cause skin irritation and to avoid applying near the eyes.  In the event of skin irritation, the patient was advised to reduce the amount of the drug applied or use it less frequently.   The patient verbalized understanding of the proper use and possible adverse effects of azelaic acid.  All of the patient's questions and concerns were addressed. Dapsone Counseling: I discussed with the patient the risks of dapsone including but not limited to hemolytic anemia, agranulocytosis, rashes, methemoglobinemia, kidney failure, peripheral neuropathy, headaches, GI upset, and liver toxicity.  Patients who start dapsone require monitoring including baseline LFTs and weekly CBCs for the first month, then every month thereafter.  The patient verbalized understanding of the proper use and possible adverse effects of dapsone.  All of the patient's questions and concerns were addressed. Cimetidine Pregnancy And Lactation Text: This medication is Pregnancy Category B and is considered safe during pregnancy. It is also excreted in breast milk and breast feeding isn't recommended. Acitretin Pregnancy And Lactation Text: This medication is Pregnancy Category X and should not be given to women who are pregnant or may become pregnant in the future. This medication is excreted in breast milk. Opzelura Pregnancy And Lactation Text: There is insufficient data to evaluate drug-associated risk for major birth defects, miscarriage, or other adverse maternal or fetal outcomes.  There is a pregnancy registry that monitors pregnancy outcomes in pregnant persons exposed to the medication during pregnancy.  It is unknown if this medication is excreted in breast milk.  Do not breastfeed during treatment and for about 4 weeks after the last dose. Solaraze Counseling:  I discussed with the patient the risks of Solaraze including but not limited to erythema, scaling, itching, weeping, crusting, and pain. Topical Clindamycin Pregnancy And Lactation Text: This medication is Pregnancy Category B and is considered safe during pregnancy. It is unknown if it is excreted in breast milk. Imiquimod Counseling:  I discussed with the patient the risks of imiquimod including but not limited to erythema, scaling, itching, weeping, crusting, and pain.  Patient understands that the inflammatory response to imiquimod is variable from person to person and was educated regarded proper titration schedule.  If flu-like symptoms develop, patient knows to discontinue the medication and contact us. Rifampin Counseling: I discussed with the patient the risks of rifampin including but not limited to liver damage, kidney damage, red-orange body fluids, nausea/vomiting and severe allergy. Thalidomide Counseling: I discussed with the patient the risks of thalidomide including but not limited to birth defects, anxiety, weakness, chest pain, dizziness, cough and severe allergy. Enbrel Pregnancy And Lactation Text: This medication is Pregnancy Category B and is considered safe during pregnancy. It is unknown if this medication is excreted in breast milk. Spironolactone Pregnancy And Lactation Text: This medication can cause feminization of the male fetus and should be avoided during pregnancy. The active metabolite is also found in breast milk. Doxycycline Pregnancy And Lactation Text: This medication is Pregnancy Category D and not consider safe during pregnancy. It is also excreted in breast milk but is considered safe for shorter treatment courses. Griseofulvin Pregnancy And Lactation Text: This medication is Pregnancy Category X and is known to cause serious birth defects. It is unknown if this medication is excreted in breast milk but breast feeding should be avoided. Siliq Counseling:  I discussed with the patient the risks of Siliq including but not limited to new or worsening depression, suicidal thoughts and behavior, immunosuppression, malignancy, posterior leukoencephalopathy syndrome, and serious infections.  The patient understands that monitoring is required including a PPD at baseline and must alert us or the primary physician if symptoms of infection or other concerning signs are noted. There is also a special program designed to monitor depression which is required with Siliq. Odomzo Counseling- I discussed with the patient the risks of Odomzo including but not limited to nausea, vomiting, diarrhea, constipation, weight loss, changes in the sense of taste, decreased appetite, muscle spasms, and hair loss.  The patient verbalized understanding of the proper use and possible adverse effects of Odomzo.  All of the patient's questions and concerns were addressed. Adbry Counseling: I discussed with the patient the risks of tralokinumab including but not limited to eye infection and irritation, cold sores, injection site reactions, worsening of asthma, allergic reactions and increased risk of parasitic infection.  Live vaccines should be avoided while taking tralokinumab. The patient understands that monitoring is required and they must alert us or the primary physician if symptoms of infection or other concerning signs are noted. Rinvoq Counseling: I discussed with the patient the risks of Rinvoq therapy including but not limited to upper respiratory tract infections, shingles, cold sores, bronchitis, nausea, cough, fever, acne, and headache. Live vaccines should be avoided.  This medication has been linked to serious infections; higher rate of mortality; malignancy and lymphoproliferative disorders; major adverse cardiovascular events; thrombosis; thrombocytopenia, anemia, and neutropenia; lipid elevations; liver enzyme elevations; and gastrointestinal perforations. Wartpeel Counseling:  I discussed with the patient the risks of Wartpeel including but not limited to erythema, scaling, itching, weeping, crusting, and pain. Oral Minoxidil Pregnancy And Lactation Text: This medication should only be used when clearly needed if you are pregnant, attempting to become pregnant or breast feeding. Azelaic Acid Pregnancy And Lactation Text: This medication is considered safe during pregnancy and breast feeding. Azithromycin Counseling:  I discussed with the patient the risks of azithromycin including but not limited to GI upset, allergic reaction, drug rash, diarrhea, and yeast infections. Low Dose Naltrexone Counseling- I discussed with the patient the potential risks and side effects of low dose naltrexone including but not limited to: more vivid dreams, headaches, nausea, vomiting, abdominal pain, fatigue, dizziness, and anxiety. Acitretin Counseling:  I discussed with the patient the risks of acitretin including but not limited to hair loss, dry lips/skin/eyes, liver damage, hyperlipidemia, depression/suicidal ideation, photosensitivity.  Serious rare side effects can include but are not limited to pancreatitis, pseudotumor cerebri, bony changes, clot formation/stroke/heart attack.  Patient understands that alcohol is contraindicated since it can result in liver toxicity and significantly prolong the elimination of the drug by many years. Zyclara Pregnancy And Lactation Text: This medication is Pregnancy Category C. It is unknown if this medication is excreted in breast milk. Doxepin Counseling:  Patient advised that the medication is sedating and not to drive a car after taking this medication. Patient informed of potential adverse effects including but not limited to dry mouth, urinary retention, and blurry vision.  The patient verbalized understanding of the proper use and possible adverse effects of doxepin.  All of the patient's questions and concerns were addressed. Colchicine Pregnancy And Lactation Text: This medication is Pregnancy Category C and isn't considered safe during pregnancy. It is excreted in breast milk. Cyclosporine Pregnancy And Lactation Text: This medication is Pregnancy Category C and it isn't know if it is safe during pregnancy. This medication is excreted in breast milk. Drysol Counseling:  I discussed with the patient the risks of drysol/aluminum chloride including but not limited to skin rash, itching, irritation, burning. Picato Counseling:  I discussed with the patient the risks of Picato including but not limited to erythema, scaling, itching, weeping, crusting, and pain. Solaraze Pregnancy And Lactation Text: This medication is Pregnancy Category B and is considered safe. There is some data to suggest avoiding during the third trimester. It is unknown if this medication is excreted in breast milk. Sski Pregnancy And Lactation Text: This medication is Pregnancy Category D and isn't considered safe during pregnancy. It is excreted in breast milk. Rifampin Pregnancy And Lactation Text: This medication is Pregnancy Category C and it isn't know if it is safe during pregnancy. It is also excreted in breast milk and should not be used if you are breast feeding. Topical Ketoconazole Counseling: Patient counseled that this medication may cause skin irritation or allergic reactions.  In the event of skin irritation, the patient was advised to reduce the amount of the drug applied or use it less frequently.   The patient verbalized understanding of the proper use and possible adverse effects of ketoconazole.  All of the patient's questions and concerns were addressed. Spironolactone Counseling: Patient advised regarding risks of diarrhea, abdominal pain, hyperkalemia, birth defects (for female patients), liver toxicity and renal toxicity. The patient may need blood work to monitor liver and kidney function and potassium levels while on therapy. The patient verbalized understanding of the proper use and possible adverse effects of spironolactone.  All of the patient's questions and concerns were addressed. Enbrel Counseling:  I discussed with the patient the risks of etanercept including but not limited to myelosuppression, immunosuppression, autoimmune hepatitis, demyelinating diseases, lymphoma, and infections.  The patient understands that monitoring is required including a PPD at baseline and must alert us or the primary physician if symptoms of infection or other concerning signs are noted. Rituxan Pregnancy And Lactation Text: This medication is Pregnancy Category C and it isn't know if it is safe during pregnancy. It is unknown if this medication is excreted in breast milk but similar antibodies are known to be excreted. Itraconazole Counseling:  I discussed with the patient the risks of itraconazole including but not limited to liver damage, nausea/vomiting, neuropathy, and severe allergy.  The patient understands that this medication is best absorbed when taken with acidic beverages such as non-diet cola or ginger ale.  The patient understands that monitoring is required including baseline LFTs and repeat LFTs at intervals.  The patient understands that they are to contact us or the primary physician if concerning signs are noted. Libtayo Pregnancy And Lactation Text: This medication is contraindicated in pregnancy and when breast feeding. Erythromycin Counseling:  I discussed with the patient the risks of erythromycin including but not limited to GI upset, allergic reaction, drug rash, diarrhea, increase in liver enzymes, and yeast infections. Olumiant Pregnancy And Lactation Text: Based on animal studies, Olumiant may cause embryo-fetal harm when administered to pregnant women.  The medication should not be used in pregnancy.  Breastfeeding is not recommended during treatment. Isotretinoin Counseling: Patient should get monthly blood tests, not donate blood, not drive at night if vision affected, not share medication, and not undergo elective surgery for 6 months after tx completed. Side effects reviewed, pt to contact office should one occur. Benzoyl Peroxide Counseling: Patient counseled that medicine may cause skin irritation and bleach clothing.  In the event of skin irritation, the patient was advised to reduce the amount of the drug applied or use it less frequently.   The patient verbalized understanding of the proper use and possible adverse effects of benzoyl peroxide.  All of the patient's questions and concerns were addressed. Oral Minoxidil Counseling- I discussed with the patient the risks of oral minoxidil including but not limited to shortness of breath, swelling of the feet or ankles, dizziness, lightheadedness, unwanted hair growth and allergic reaction.  The patient verbalized understanding of the proper use and possible adverse effects of oral minoxidil.  All of the patient's questions and concerns were addressed. Hydroxychloroquine Pregnancy And Lactation Text: This medication has been shown to cause fetal harm but it isn't assigned a Pregnancy Risk Category. There are small amounts excreted in breast milk. Azithromycin Pregnancy And Lactation Text: This medication is considered safe during pregnancy and is also secreted in breast milk. Adbry Pregnancy And Lactation Text: It is unknown if this medication will adversely affect pregnancy or breast feeding. Doxepin Pregnancy And Lactation Text: This medication is Pregnancy Category C and it isn't known if it is safe during pregnancy. It is also excreted in breast milk and breast feeding isn't recommended. Colchicine Counseling:  Patient counseled regarding adverse effects including but not limited to stomach upset (nausea, vomiting, stomach pain, or diarrhea).  Patient instructed to limit alcohol consumption while taking this medication.  Colchicine may reduce blood counts especially with prolonged use.  The patient understands that monitoring of kidney function and blood counts may be required, especially at baseline. The patient verbalized understanding of the proper use and possible adverse effects of colchicine.  All of the patient's questions and concerns were addressed. Topical Sulfur Applications Pregnancy And Lactation Text: This medication is considered safe during pregnancy and breast feeding secondary to limited systemic absorption. Zyclara Counseling:  I discussed with the patient the risks of imiquimod including but not limited to erythema, scaling, itching, weeping, crusting, and pain.  Patient understands that the inflammatory response to imiquimod is variable from person to person and was educated regarded proper titration schedule.  If flu-like symptoms develop, patient knows to discontinue the medication and contact us. Taltz Counseling: I discussed with the patient the risks of ixekizumab including but not limited to immunosuppression, serious infections, worsening of inflammatory bowel disease and drug reactions.  The patient understands that monitoring is required including a PPD at baseline and must alert us or the primary physician if symptoms of infection or other concerning signs are noted. Cyclosporine Counseling:  I discussed with the patient the risks of cyclosporine including but not limited to hypertension, gingival hyperplasia,myelosuppression, immunosuppression, liver damage, kidney damage, neurotoxicity, lymphoma, and serious infections. The patient understands that monitoring is required including baseline blood pressure, CBC, CMP, lipid panel and uric acid, and then 1-2 times monthly CMP and blood pressure. Sarecycline Counseling: Patient advised regarding possible photosensitivity and discoloration of the teeth, skin, lips, tongue and gums.  Patient instructed to avoid sunlight, if possible.  When exposed to sunlight, patients should wear protective clothing, sunglasses, and sunscreen.  The patient was instructed to call the office immediately if the following severe adverse effects occur:  hearing changes, easy bruising/bleeding, severe headache, or vision changes.  The patient verbalized understanding of the proper use and possible adverse effects of sarecycline.  All of the patient's questions and concerns were addressed. SSKI Counseling:  I discussed with the patient the risks of SSKI including but not limited to thyroid abnormalities, metallic taste, GI upset, fever, headache, acne, arthralgias, paraesthesias, lymphadenopathy, easy bleeding, arrhythmias, and allergic reaction. Soolantra Counseling: I discussed with the patients the risks of topial Soolantra. This is a medicine which decreases the number of mites and inflammation in the skin. You experience burning, stinging, eye irritation or allergic reactions.  Please call our office if you develop any problems from using this medication. Birth Control Pills Pregnancy And Lactation Text: This medication should be avoided if pregnant and for the first 30 days post-partum. Dupixent Pregnancy And Lactation Text: This medication likely crosses the placenta but the risk for the fetus is uncertain. This medication is excreted in breast milk. Klisyri Counseling:  I discussed with the patient the risks of Klisyri including but not limited to erythema, scaling, itching, weeping, crusting, and pain. Rituxan Counseling:  I discussed with the patient the risks of Rituxan infusions. Side effects can include infusion reactions, severe drug rashes including mucocutaneous reactions, reactivation of latent hepatitis and other infections and rarely progressive multifocal leukoencephalopathy.  All of the patient's questions and concerns were addressed. Libtayo Counseling- I discussed with the patient the risks of Libtayo including but not limited to nausea, vomiting, diarrhea, and bone or muscle pain.  The patient verbalized understanding of the proper use and possible adverse effects of Libtayo.  All of the patient's questions and concerns were addressed. Erythromycin Pregnancy And Lactation Text: This medication is Pregnancy Category B and is considered safe during pregnancy. It is also excreted in breast milk. Olumiant Counseling: I discussed with the patient the risks of Olumiant therapy including but not limited to upper respiratory tract infections, shingles, cold sores, and nausea. Live vaccines should be avoided.  This medication has been linked to serious infections; higher rate of mortality; malignancy and lymphoproliferative disorders; major adverse cardiovascular events; thrombosis; gastrointestinal perforations; neutropenia; lymphopenia; anemia; liver enzyme elevations; and lipid elevations. Isotretinoin Pregnancy And Lactation Text: This medication is Pregnancy Category X and is considered extremely dangerous during pregnancy. It is unknown if it is excreted in breast milk. Olanzapine Pregnancy And Lactation Text: This medication is pregnancy category C.   There are no adequate and well controlled trials with olanzapine in pregnant females.  Olanzapine should be used during pregnancy only if the potential benefit justifies the potential risk to the fetus.   In a study in lactating healthy women, olanzapine was excreted in breast milk.  It is recommended that women taking olanzapine should not breast feed. Bactrim Counseling:  I discussed with the patient the risks of sulfa antibiotics including but not limited to GI upset, allergic reaction, drug rash, diarrhea, dizziness, photosensitivity, and yeast infections.  Rarely, more serious reactions can occur including but not limited to aplastic anemia, agranulocytosis, methemoglobinemia, blood dyscrasias, liver or kidney failure, lung infiltrates or desquamative/blistering drug rashes. Cimzia Counseling:  I discussed with the patient the risks of Cimzia including but not limited to immunosuppression, allergic reactions and infections.  The patient understands that monitoring is required including a PPD at baseline and must alert us or the primary physician if symptoms of infection or other concerning signs are noted. Cyclophosphamide Pregnancy And Lactation Text: This medication is Pregnancy Category D and it isn't considered safe during pregnancy. This medication is excreted in breast milk. Benzoyl Peroxide Pregnancy And Lactation Text: This medication is Pregnancy Category C. It is unknown if benzoyl peroxide is excreted in breast milk. Hydroxyzine Counseling: Patient advised that the medication is sedating and not to drive a car after taking this medication.  Patient informed of potential adverse effects including but not limited to dry mouth, urinary retention, and blurry vision.  The patient verbalized understanding of the proper use and possible adverse effects of hydroxyzine.  All of the patient's questions and concerns were addressed. Hydroxychloroquine Counseling:  I discussed with the patient that a baseline ophthalmologic exam is needed at the start of therapy and every year thereafter while on therapy. A CBC may also be warranted for monitoring.  The side effects of this medication were discussed with the patient, including but not limited to agranulocytosis, aplastic anemia, seizures, rashes, retinopathy, and liver toxicity. Patient instructed to call the office should any adverse effect occur.  The patient verbalized understanding of the proper use and possible adverse effects of Plaquenil.  All the patient's questions and concerns were addressed. Protopic Counseling: Patient may experience a mild burning sensation during topical application. Protopic is not approved in children less than 2 years of age. There have been case reports of hematologic and skin malignancies in patients using topical calcineurin inhibitors although causality is questionable. Zoryve Pregnancy And Lactation Text: It is unknown if this medication can cause problems during pregnancy and breastfeeding. Topical Metronidazole Counseling: Metronidazole is a topical antibiotic medication. You may experience burning, stinging, redness, or allergic reactions.  Please call our office if you develop any problems from using this medication. Soolantra Pregnancy And Lactation Text: This medication is Pregnancy Category C. This medication is considered safe during breast feeding. Elidel Counseling: Patient may experience a mild burning sensation during topical application. Elidel is not approved in children less than 2 years of age. There have been case reports of hematologic and skin malignancies in patients using topical calcineurin inhibitors although causality is questionable. Birth Control Pills Counseling: Birth Control Pill Counseling: I discussed with the patient the potential side effects of OCPs including but not limited to increased risk of stroke, heart attack, thrombophlebitis, deep venous thrombosis, hepatic adenomas, breast changes, GI upset, headaches, and depression.  The patient verbalized understanding of the proper use and possible adverse effects of OCPs. All of the patient's questions and concerns were addressed. Klisyri Pregnancy And Lactation Text: It is unknown if this medication can harm a developing fetus or if it is excreted in breast milk. Use Enhanced Medication Counseling?: No Propranolol Pregnancy And Lactation Text: This medication is Pregnancy Category C and it isn't known if it is safe during pregnancy. It is excreted in breast milk. Sarecycline Pregnancy And Lactation Text: This medication is Pregnancy Category D and not consider safe during pregnancy. It is also excreted in breast milk. Ketoconazole Counseling:   Patient counseled regarding improving absorption with orange juice.  Adverse effects include but are not limited to breast enlargement, headache, diarrhea, nausea, upset stomach, liver function test abnormalities, taste disturbance, and stomach pain.  There is a rare possibility of liver failure that can occur when taking ketoconazole. The patient understands that monitoring of LFTs may be required, especially at baseline. The patient verbalized understanding of the proper use and possible adverse effects of ketoconazole.  All of the patient's questions and concerns were addressed. Metronidazole Counseling:  I discussed with the patient the risks of metronidazole including but not limited to seizures, nausea/vomiting, a metallic taste in the mouth, nausea/vomiting and severe allergy. Cibinqo Pregnancy And Lactation Text: It is unknown if this medication will adversely affect pregnancy or breast feeding.  You should not take this medication if you are currently pregnant or planning a pregnancy or while breastfeeding. Bactrim Pregnancy And Lactation Text: This medication is Pregnancy Category D and is known to cause fetal risk.  It is also excreted in breast milk. Stelara Counseling:  I discussed with the patient the risks of ustekinumab including but not limited to immunosuppression, malignancy, posterior leukoencephalopathy syndrome, and serious infections.  The patient understands that monitoring is required including a PPD at baseline and must alert us or the primary physician if symptoms of infection or other concerning signs are noted. Cimzia Pregnancy And Lactation Text: This medication crosses the placenta but can be considered safe in certain situations. Cimzia may be excreted in breast milk. High Dose Vitamin A Counseling: Side effects reviewed, pt to contact office should one occur. Olanzapine Counseling- I discussed with the patient the common side effects of olanzapine including but are not limited to: lack of energy, dry mouth, increased appetite, sleepiness, tremor, constipation, dizziness, changes in behavior, or restlessness.  Explained that teenagers are more likely to experience headaches, abdominal pain, pain in the arms or legs, tiredness, and sleepiness.  Serious side effects include but are not limited: increased risk of death in elderly patients who are confused, have memory loss, or dementia-related psychosis; hyperglycemia; increased cholesterol and triglycerides; and weight gain. Cyclophosphamide Counseling:  I discussed with the patient the risks of cyclophosphamide including but not limited to hair loss, hormonal abnormalities, decreased fertility, abdominal pain, diarrhea, nausea and vomiting, bone marrow suppression and infection. The patient understands that monitoring is required while taking this medication. Carac Counseling:  I discussed with the patient the risks of Carac including but not limited to erythema, scaling, itching, weeping, crusting, and pain. Hydroxyzine Pregnancy And Lactation Text: This medication is not safe during pregnancy and should not be taken. It is also excreted in breast milk and breast feeding isn't recommended. Glycopyrrolate Pregnancy And Lactation Text: This medication is Pregnancy Category B and is considered safe during pregnancy. It is unknown if it is excreted breast milk. Zoryve Counseling:  I discussed with the patient that Zoryve is not for use in the eyes, mouth or vagina. The most commonly reported side effects include diarrhea, headache, insomnia, application site pain, upper respiratory tract infections, and urinary tract infections.  All of the patient's questions and concerns were addressed. Clofazimine Counseling:  I discussed with the patient the risks of clofazimine including but not limited to skin and eye pigmentation, liver damage, nausea/vomiting, gastrointestinal bleeding and allergy. Topical Metronidazole Pregnancy And Lactation Text: This medication is Pregnancy Category B and considered safe during pregnancy.  It is also considered safe to use while breastfeeding. Finasteride Pregnancy And Lactation Text: This medication is absolutely contraindicated during pregnancy. It is unknown if it is excreted in breast milk. Minoxidil Counseling: Minoxidil is a topical medication which can increase blood flow where it is applied. It is uncertain how this medication increases hair growth. Side effects are uncommon and include stinging and allergic reactions. Valtrex Pregnancy And Lactation Text: this medication is Pregnancy Category B and is considered safe during pregnancy. This medication is not directly found in breast milk but it's metabolite acyclovir is present. Topical Retinoid counseling:  Patient advised to apply a pea-sized amount only at bedtime and wait 30 minutes after washing their face before applying.  If too drying, patient may add a non-comedogenic moisturizer. The patient verbalized understanding of the proper use and possible adverse effects of retinoids.  All of the patient's questions and concerns were addressed. Protopic Pregnancy And Lactation Text: This medication is Pregnancy Category C. It is unknown if this medication is excreted in breast milk when applied topically. Propranolol Counseling:  I discussed with the patient the risks of propranolol including but not limited to low heart rate, low blood pressure, low blood sugar, restlessness and increased cold sensitivity. They should call the office if they experience any of these side effects. Tetracycline Counseling: Patient counseled regarding possible photosensitivity and increased risk for sunburn.  Patient instructed to avoid sunlight, if possible.  When exposed to sunlight, patients should wear protective clothing, sunglasses, and sunscreen.  The patient was instructed to call the office immediately if the following severe adverse effects occur:  hearing changes, easy bruising/bleeding, severe headache, or vision changes.  The patient verbalized understanding of the proper use and possible adverse effects of tetracycline.  All of the patient's questions and concerns were addressed. Patient understands to avoid pregnancy while on therapy due to potential birth defects. Erivedge Counseling- I discussed with the patient the risks of Erivedge including but not limited to nausea, vomiting, diarrhea, constipation, weight loss, changes in the sense of taste, decreased appetite, muscle spasms, and hair loss.  The patient verbalized understanding of the proper use and possible adverse effects of Erivedge.  All of the patient's questions and concerns were addressed. Metronidazole Pregnancy And Lactation Text: This medication is Pregnancy Category B and considered safe during pregnancy.  It is also excreted in breast milk. Cibinqo Counseling: I discussed with the patient the risks of Cibinqo therapy including but not limited to common cold, nausea, headache, cold sores, increased blood CPK levels, dizziness, UTIs, fatigue, acne, and vomitting. Live vaccines should be avoided.  This medication has been linked to serious infections; higher rate of mortality; malignancy and lymphoproliferative disorders; major adverse cardiovascular events; thrombosis; thrombocytopenia and lymphopenia; lipid elevations; and retinal detachment. Infliximab Counseling:  I discussed with the patient the risks of infliximab including but not limited to myelosuppression, immunosuppression, autoimmune hepatitis, demyelinating diseases, lymphoma, and serious infections.  The patient understands that monitoring is required including a PPD at baseline and must alert us or the primary physician if symptoms of infection or other concerning signs are noted. Ketoconazole Pregnancy And Lactation Text: This medication is Pregnancy Category C and it isn't know if it is safe during pregnancy. It is also excreted in breast milk and breast feeding isn't recommended. Cosentyx Counseling:  I discussed with the patient the risks of Cosentyx including but not limited to worsening of Crohn's disease, immunosuppression, allergic reactions and infections.  The patient understands that monitoring is required including a PPD at baseline and must alert us or the primary physician if symptoms of infection or other concerning signs are noted. Xelharmanz Pregnancy And Lactation Text: This medication is Pregnancy Category D and is not considered safe during pregnancy.  The risk during breast feeding is also uncertain. Cellcept Pregnancy And Lactation Text: This medication is Pregnancy Category D and isn't considered safe during pregnancy. It is unknown if this medication is excreted in breast milk. High Dose Vitamin A Pregnancy And Lactation Text: High dose vitamin A therapy is contraindicated during pregnancy and breast feeding. Glycopyrrolate Counseling:  I discussed with the patient the risks of glycopyrrolate including but not limited to skin rash, drowsiness, dry mouth, difficulty urinating, and blurred vision. Nsaids Pregnancy And Lactation Text: These medications are considered safe up to 30 weeks gestation. It is excreted in breast milk. Cephalexin Counseling: I counseled the patient regarding use of cephalexin as an antibiotic for prophylactic and/or therapeutic purposes. Cephalexin (commonly prescribed under brand name Keflex) is a cephalosporin antibiotic which is active against numerous classes of bacteria, including most skin bacteria. Side effects may include nausea, diarrhea, gastrointestinal upset, rash, hives, yeast infections, and in rare cases, hepatitis, kidney disease, seizures, fever, confusion, neurologic symptoms, and others. Patients with severe allergies to penicillin medications are cautioned that there is about a 10% incidence of cross-reactivity with cephalosporins. When possible, patients with penicillin allergies should use alternatives to cephalosporins for antibiotic therapy. Minoxidil Pregnancy And Lactation Text: This medication has not been assigned a Pregnancy Risk Category but animal studies failed to show danger with the topical medication. It is unknown if the medication is excreted in breast milk. Qbrexza Counseling:  I discussed with the patient the risks of Qbrexza including but not limited to headache, mydriasis, blurred vision, dry eyes, nasal dryness, dry mouth, dry throat, dry skin, urinary hesitation, and constipation.  Local skin reactions including erythema, burning, stinging, and itching can also occur. Valtrex Counseling: I discussed with the patient the risks of valacyclovir including but not limited to kidney damage, nausea, vomiting and severe allergy.  The patient understands that if the infection seems to be worsening or is not improving, they are to call. Topical Steroids Counseling: I discussed with the patient that prolonged use of topical steroids can result in the increased appearance of superficial blood vessels (telangiectasias), lightening (hypopigmentation) and thinning of the skin (atrophy).  Patient understands to avoid using high potency steroids in skin folds, the groin or the face.  The patient verbalized understanding of the proper use and possible adverse effects of topical steroids.  All of the patient's questions and concerns were addressed. Albendazole Counseling:  I discussed with the patient the risks of albendazole including but not limited to cytopenia, kidney damage, nausea/vomiting and severe allergy.  The patient understands that this medication is being used in an off-label manner. Finasteride Male Counseling: Finasteride Counseling:  I discussed with the patient the risks of use of finasteride including but not limited to decreased libido, decreased ejaculate volume, gynecomastia, and depression. Women should not handle medication.  All of the patient's questions and concerns were addressed. Eucrisa Counseling: Patient may experience a mild burning sensation during topical application. Eucrisa is not approved in children less than 3 months of age. Minocycline Counseling: Patient advised regarding possible photosensitivity and discoloration of the teeth, skin, lips, tongue and gums.  Patient instructed to avoid sunlight, if possible.  When exposed to sunlight, patients should wear protective clothing, sunglasses, and sunscreen.  The patient was instructed to call the office immediately if the following severe adverse effects occur:  hearing changes, easy bruising/bleeding, severe headache, or vision changes.  The patient verbalized understanding of the proper use and possible adverse effects of minocycline.  All of the patient's questions and concerns were addressed. Terbinafine Counseling: Patient counseling regarding adverse effects of terbinafine including but not limited to headache, diarrhea, rash, upset stomach, liver function test abnormalities, itching, taste/smell disturbance, nausea, abdominal pain, and flatulence.  There is a rare possibility of liver failure that can occur when taking terbinafine.  The patient understands that a baseline LFT and kidney function test may be required. The patient verbalized understanding of the proper use and possible adverse effects of terbinafine.  All of the patient's questions and concerns were addressed. Skyrizi Counseling: I discussed with the patient the risks of risankizumab-rzaa including but not limited to immunosuppression, and serious infections.  The patient understands that monitoring is required including a PPD at baseline and must alert us or the primary physician if symptoms of infection or other concerning signs are noted. Nsaids Counseling: NSAID Counseling: I discussed with the patient that NSAIDs should be taken with food. Prolonged use of NSAIDs can result in the development of stomach ulcers.  Patient advised to stop taking NSAIDs if abdominal pain occurs.  The patient verbalized understanding of the proper use and possible adverse effects of NSAIDs.  All of the patient's questions and concerns were addressed. Detail Level: Simple Cephalexin Pregnancy And Lactation Text: This medication is Pregnancy Category B and considered safe during pregnancy.  It is also excreted in breast milk but can be used safely for shorter doses. Xeljanz Counseling: I discussed with the patient the risks of Xeljanz therapy including increased risk of infection, liver issues, headache, diarrhea, or cold symptoms. Live vaccines should be avoided. They were instructed to call if they have any problems. VTAMA Counseling: I discussed with the patient that VTAMA is not for use in the eyes, mouth or mouth. They should call the office if they develop any signs of allergic reactions to VTAMA. The patient verbalized understanding of the proper use and possible adverse effects of VTAMA.  All of the patient's questions and concerns were addressed. Cellcept Counseling:  I discussed with the patient the risks of mycophenolate mofetil including but not limited to infection/immunosuppression, GI upset, hypokalemia, hypercholesterolemia, bone marrow suppression, lymphoproliferative disorders, malignancy, GI ulceration/bleed/perforation, colitis, interstitial lung disease, kidney failure, progressive multifocal leukoencephalopathy, and birth defects.  The patient understands that monitoring is required including a baseline creatinine and regular CBC testing. In addition, patient must alert us immediately if symptoms of infection or other concerning signs are noted. Calcipotriene Counseling:  I discussed with the patient the risks of calcipotriene including but not limited to erythema, scaling, itching, and irritation. Arava Counseling:  Patient counseled regarding adverse effects of Arava including but not limited to nausea, vomiting, abnormalities in liver function tests. Patients may develop mouth sores, rash, diarrhea, and abnormalities in blood counts. The patient understands that monitoring is required including LFTs and blood counts.  There is a rare possibility of scarring of the liver and lung problems that can occur when taking methotrexate. Persistent nausea, loss of appetite, pale stools, dark urine, cough, and shortness of breath should be reported immediately. Patient advised to discontinue Arava treatment and consult with a physician prior to attempting conception. The patient will have to undergo a treatment to eliminate Arava from the body prior to conception. Xolair Pregnancy And Lactation Text: This medication is Pregnancy Category B and is considered safe during pregnancy. This medication is excreted in breast milk. Qbrexza Pregnancy And Lactation Text: There is no available data on Qbrexza use in pregnant women.  There is no available data on Qbrexza use in lactation. Topical Steroids Applications Pregnancy And Lactation Text: Most topical steroids are considered safe to use during pregnancy and lactation.  Any topical steroid applied to the breast or nipple should be washed off before breastfeeding. Tazorac Counseling:  Patient advised that medication is irritating and drying.  Patient may need to apply sparingly and wash off after an hour before eventually leaving it on overnight.  The patient verbalized understanding of the proper use and possible adverse effects of tazorac.  All of the patient's questions and concerns were addressed. Dutasteride Pregnancy And Lactation Text: This medication is absolutely contraindicated in women, especially during pregnancy and breast feeding. Feminization of male fetuses is possible if taking while pregnant. Mirvaso Counseling: Mirvaso is a topical medication which can decrease superficial blood flow where applied. Side effects are uncommon and include stinging, redness and allergic reactions. Tranexamic Acid Pregnancy And Lactation Text: It is unknown if this medication is safe during pregnancy or breast feeding. Oxybutynin Counseling:  I discussed with the patient the risks of oxybutynin including but not limited to skin rash, drowsiness, dry mouth, difficulty urinating, and blurred vision. Calcipotriene Pregnancy And Lactation Text: The use of this medication during pregnancy or lactation is not recommended as there is insufficient data. Ilumya Counseling: I discussed with the patient the risks of tildrakizumab including but not limited to immunosuppression, malignancy, posterior leukoencephalopathy syndrome, and serious infections.  The patient understands that monitoring is required including a PPD at baseline and must alert us or the primary physician if symptoms of infection or other concerning signs are noted. Albendazole Pregnancy And Lactation Text: This medication is Pregnancy Category C and it isn't known if it is safe during pregnancy. It is also excreted in breast milk. Fluconazole Counseling:  Patient counseled regarding adverse effects of fluconazole including but not limited to headache, diarrhea, nausea, upset stomach, liver function test abnormalities, taste disturbance, and stomach pain.  There is a rare possibility of liver failure that can occur when taking fluconazole.  The patient understands that monitoring of LFTs and kidney function test may be required, especially at baseline. The patient verbalized understanding of the proper use and possible adverse effects of fluconazole.  All of the patient's questions and concerns were addressed. Clindamycin Counseling: I counseled the patient regarding use of clindamycin as an antibiotic for prophylactic and/or therapeutic purposes. Clindamycin is active against numerous classes of bacteria, including skin bacteria. Side effects may include nausea, diarrhea, gastrointestinal upset, rash, hives, yeast infections, and in rare cases, colitis. Sotyktu Pregnancy And Lactation Text: There is insufficient data to evaluate whether or not Sotyktu is safe to use during pregnancy.   It is not known if Sotyktu passes into breast milk and whether or not it is safe to use when breastfeeding.   Dupixent Counseling: I discussed with the patient the risks of dupilumab including but not limited to eye inflammation and irritation, cold sores, injection site reactions, allergic reactions and increased risk of parasitic infection. The patient understands that monitoring is required and they must alert us or the primary physician if symptoms of infection or other concerning signs are noted. Aklief counseling:  Patient advised to apply a pea-sized amount only at bedtime and wait 30 minutes after washing their face before applying.  If too drying, patient may add a non-comedogenic moisturizer.  The most commonly reported side effects including irritation, redness, scaling, dryness, stinging, burning, itching, and increased risk of sunburn.  The patient verbalized understanding of the proper use and possible adverse effects of retinoids.  All of the patient's questions and concerns were addressed. Niacinamide Pregnancy And Lactation Text: These medications are considered safe during pregnancy. Bexarotene Pregnancy And Lactation Text: This medication is Pregnancy Category X and should not be given to women who are pregnant or may become pregnant. This medication should not be used if you are breast feeding. Gabapentin Counseling: I discussed with the patient the risks of gabapentin including but not limited to dizziness, somnolence, fatigue and ataxia. Winlevi Pregnancy And Lactation Text: This medication is considered safe during pregnancy and breastfeeding. Xolair Counseling:  Patient informed of potential adverse effects including but not limited to fever, muscle aches, rash and allergic reactions.  The patient verbalized understanding of the proper use and possible adverse effects of Xolair.  All of the patient's questions and concerns were addressed. Prednisone Counseling:  I discussed with the patient the risks of prolonged use of prednisone including but not limited to weight gain, insomnia, osteoporosis, mood changes, diabetes, susceptibility to infection, glaucoma and high blood pressure.  In cases where prednisone use is prolonged, patients should be monitored with blood pressure checks, serum glucose levels and an eye exam.  Additionally, the patient may need to be placed on GI prophylaxis, PCP prophylaxis, and calcium and vitamin D supplementation and/or a bisphosphonate.  The patient verbalized understanding of the proper use and the possible adverse effects of prednisone.  All of the patient's questions and concerns were addressed. Cantharidin Counseling:  I discussed with the patient the risks of Cantharidin including but not limited to pain, redness, burning, itching, and blistering. Topical Sulfur Applications Counseling: Topical Sulfur Counseling: Patient counseled that this medication may cause skin irritation or allergic reactions.  In the event of skin irritation, the patient was advised to reduce the amount of the drug applied or use it less frequently.   The patient verbalized understanding of the proper use and possible adverse effects of topical sulfur application.  All of the patient's questions and concerns were addressed. Dutasteride Male Counseling: Dustasteride Counseling:  I discussed with the patient the risks of use of dutasteride including but not limited to decreased libido, decreased ejaculate volume, and gynecomastia. Women who can become pregnant should not handle medication.  All of the patient's questions and concerns were addressed. Hydroquinone Counseling:  Patient advised that medication may result in skin irritation, lightening (hypopigmentation), dryness, and burning.  In the event of skin irritation, the patient was advised to reduce the amount of the drug applied or use it less frequently.  Rarely, spots that are treated with hydroquinone can become darker (pseudoochronosis).  Should this occur, patient instructed to stop medication and call the office. The patient verbalized understanding of the proper use and possible adverse effects of hydroquinone.  All of the patient's questions and concerns were addressed. Mirvaso Pregnancy And Lactation Text: This medication has not been assigned a Pregnancy Risk Category. It is unknown if the medication is excreted in breast milk. Rhofade Counseling: Rhofade is a topical medication which can decrease superficial blood flow where applied. Side effects are uncommon and include stinging, redness and allergic reactions. Tazorac Pregnancy And Lactation Text: This medication is not safe during pregnancy. It is unknown if this medication is excreted in breast milk. Tranexamic Acid Counseling:  Patient advised of the small risk of bleeding problems with tranexamic acid. They were also instructed to call if they developed any nausea, vomiting or diarrhea. All of the patient's questions and concerns were addressed. Otezla Pregnancy And Lactation Text: This medication is Pregnancy Category C and it isn't known if it is safe during pregnancy. It is unknown if it is excreted in breast milk. Ivermectin Counseling:  Patient instructed to take medication on an empty stomach with a full glass of water.  Patient informed of potential adverse effects including but not limited to nausea, diarrhea, dizziness, itching, and swelling of the extremities or lymph nodes.  The patient verbalized understanding of the proper use and possible adverse effects of ivermectin.  All of the patient's questions and concerns were addressed. Quinolones Counseling:  I discussed with the patient the risks of fluoroquinolones including but not limited to GI upset, allergic reaction, drug rash, diarrhea, dizziness, photosensitivity, yeast infections, liver function test abnormalities, tendonitis/tendon rupture. Cantharidin Pregnancy And Lactation Text: This medication has not been proven safe during pregnancy. It is unknown if this medication is excreted in breast milk. Opioid Pregnancy And Lactation Text: These medications can lead to premature delivery and should be avoided during pregnancy. These medications are also present in breast milk in small amounts. Clindamycin Pregnancy And Lactation Text: This medication can be used in pregnancy if certain situations. Clindamycin is also present in breast milk. Sotyktu Counseling:  I discussed the most common side effects of Sotyktu including: common cold, sore throat, sinus infections, cold sores, canker sores, folliculitis, and acne.  I also discussed more serious side effects of Sotyktu including but not limited to: serious allergic reactions; increased risk for infections such as TB; cancers such as lymphomas; rhabdomyolysis and elevated CPK; and elevated triglycerides and liver enzymes.  Simponi Counseling:  I discussed with the patient the risks of golimumab including but not limited to myelosuppression, immunosuppression, autoimmune hepatitis, demyelinating diseases, lymphoma, and serious infections.  The patient understands that monitoring is required including a PPD at baseline and must alert us or the primary physician if symptoms of infection or other concerning signs are noted. Azathioprine Counseling:  I discussed with the patient the risks of azathioprine including but not limited to myelosuppression, immunosuppression, hepatotoxicity, lymphoma, and infections.  The patient understands that monitoring is required including baseline LFTs, Creatinine, possible TPMP genotyping and weekly CBCs for the first month and then every 2 weeks thereafter.  The patient verbalized understanding of the proper use and possible adverse effects of azathioprine.  All of the patient's questions and concerns were addressed. Aklief Pregnancy And Lactation Text: It is unknown if this medication is safe to use during pregnancy.  It is unknown if this medication is excreted in breast milk.  Breastfeeding women should use the topical cream on the smallest area of the skin for the shortest time needed while breastfeeding.  Do not apply to nipple and areola. Niacinamide Counseling: I recommended taking niacin or niacinamide, also know as vitamin B3, twice daily. Recent evidence suggests that taking vitamin B3 (500 mg twice daily) can reduce the risk of actinic keratoses and non-melanoma skin cancers. Side effects of vitamin B3 include flushing and headache. Winlevi Counseling:  I discussed with the patient the risks of topical clascoterone including but not limited to erythema, scaling, itching, and stinging. Patient voiced their understanding. Methotrexate Pregnancy And Lactation Text: This medication is Pregnancy Category X and is known to cause fetal harm. This medication is excreted in breast milk. Dapsone Pregnancy And Lactation Text: This medication is Pregnancy Category C and is not considered safe during pregnancy or breast feeding. 5-Fu Counseling: 5-Fluorouracil Counseling:  I discussed with the patient the risks of 5-fluorouracil including but not limited to erythema, scaling, itching, weeping, crusting, and pain. Bexarotene Counseling:  I discussed with the patient the risks of bexarotene including but not limited to hair loss, dry lips/skin/eyes, liver abnormalities, hyperlipidemia, pancreatitis, depression/suicidal ideation, photosensitivity, drug rash/allergic reactions, hypothyroidism, anemia, leukopenia, infection, cataracts, and teratogenicity.  Patient understands that they will need regular blood tests to check lipid profile, liver function tests, white blood cell count, thyroid function tests and pregnancy test if applicable. Cimetidine Counseling:  I discussed with the patient the risks of Cimetidine including but not limited to gynecomastia, headache, diarrhea, nausea, drowsiness, arrhythmias, pancreatitis, skin rashes, psychosis, bone marrow suppression and kidney toxicity. Opzelura Counseling:  I discussed with the patient the risks of Opzelura including but not limited to nasopharngitis, bronchitis, ear infection, eosinophila, hives, diarrhea, folliculitis, tonsillitis, and rhinorrhea.  Taken orally, this medication has been linked to serious infections; higher rate of mortality; malignancy and lymphoproliferative disorders; major adverse cardiovascular events; thrombosis; thrombocytopenia, anemia, and neutropenia; and lipid elevations. Topical Clindamycin Counseling: Patient counseled that this medication may cause skin irritation or allergic reactions.  In the event of skin irritation, the patient was advised to reduce the amount of the drug applied or use it less frequently.   The patient verbalized understanding of the proper use and possible adverse effects of clindamycin.  All of the patient's questions and concerns were addressed. Otezla Counseling: The side effects of Otezla were discussed with the patient, including but not limited to worsening or new depression, weight loss, diarrhea, nausea, upper respiratory tract infection, and headache. Patient instructed to call the office should any adverse effect occur.  The patient verbalized understanding of the proper use and possible adverse effects of Otezla.  All the patient's questions and concerns were addressed.

## 2024-07-19 ENCOUNTER — LAB VISIT (OUTPATIENT)
Dept: LAB | Facility: HOSPITAL | Age: 84
End: 2024-07-19
Attending: INTERNAL MEDICINE
Payer: MEDICARE

## 2024-07-19 DIAGNOSIS — A31.0 MYCOBACTERIUM AVIUM-INTRACELLULARE COMPLEX: ICD-10-CM

## 2024-07-19 PROCEDURE — 87015 SPECIMEN INFECT AGNT CONCNTJ: CPT | Performed by: INTERNAL MEDICINE

## 2024-07-19 PROCEDURE — 87206 SMEAR FLUORESCENT/ACID STAI: CPT | Performed by: INTERNAL MEDICINE

## 2024-07-19 PROCEDURE — 87116 MYCOBACTERIA CULTURE: CPT | Performed by: INTERNAL MEDICINE

## 2024-07-22 LAB
ACID FAST MOD KINY STN SPEC: NORMAL
MYCOBACTERIUM SPEC QL CULT: NORMAL

## 2024-07-29 RX ORDER — ETHAMBUTOL HYDROCHLORIDE 400 MG/1
800 TABLET, FILM COATED ORAL DAILY
Qty: 60 TABLET | Refills: 5 | Status: SHIPPED | OUTPATIENT
Start: 2024-07-29 | End: 2025-07-29

## 2024-08-06 ENCOUNTER — TELEPHONE (OUTPATIENT)
Dept: FAMILY MEDICINE | Facility: CLINIC | Age: 84
End: 2024-08-06
Payer: MEDICARE

## 2024-08-06 RX ORDER — DONEPEZIL HYDROCHLORIDE 10 MG/1
TABLET, FILM COATED ORAL
Qty: 30 TABLET | Refills: 4 | Status: SHIPPED | OUTPATIENT
Start: 2024-08-06

## 2024-08-08 ENCOUNTER — TELEPHONE (OUTPATIENT)
Dept: FAMILY MEDICINE | Facility: CLINIC | Age: 84
End: 2024-08-08
Payer: MEDICARE

## 2024-08-13 NOTE — PROGRESS NOTES
CHIEF COMPLAINT:   Chief Complaint   Patient presents with    Follow-up     6 month           HISTORY OF PRESENT ILLNESS:  Diego Gunter is a 83 y.o. male who presents to the clinic today for a routine medical physical exam. His last physical exam was approximately 1 years(s) ago.    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 Dr. Pham    Hx of colonic polyps     followed by GI. Dr. Pham    Hyperlipidemia associated with type 2 diabetes mellitus     Hyperlipidemia LDL goal <100     Hypernatremia 05/05/2022    Hypertension associated with type 2 diabetes mellitus     Hypertension associated with type 2 diabetes mellitus     Hypotension     Intermittent confusion 04/29/2022    Overweight(278.02)     Prostate cancer 09/2011    followed by urology, Dr. Rogers    Pulmonary embolism     Reducible right inguinal hernia 10/10/2022    Right hemiparesis 07/22/2022    Secondary to cervical hematoma    Type II or unspecified type diabetes mellitus without mention of complication, not stated as uncontrolled     diet controlled    Urinary retention 04/29/2022       PAST SURGICAL HISTORY:  Past Surgical History:   Procedure Laterality Date    BRONCHOSCOPY N/A 10/15/2018    Procedure: BRONCHOSCOPY;  Surgeon: Pratibha Diagnostic Provider;  Location: [address];  Service: Anesthesiology;  Laterality: N/A;    CARPAL TUNNEL RELEASE Right 1/11/2023    Procedure: RELEASE, CARPAL TUNNEL;  Surgeon: Romero Lazo MD;  Location: [facility] OR;  Service:  Orthopedics;  Laterality: Right;    CATARACT EXTRACTION, BILATERAL      COLONOSCOPY N/A 2023    Procedure: COLONOSCOPY;  Surgeon: Buster Sequeira MD;  Location: [facility] ENDO (4TH FLR);  Service: Endoscopy;  Laterality: N/A;    DECOMPRESSION, NERVE, ULNAR Right 2023    Procedure: DECOMPRESSION, NERVE, ULNAR - right cub jolie and guyon's decompression as well as open right carpal tunnel release;  Surgeon: Romero Lazo MD;  Location: [facility];  Service: Orthopedics;  Laterality: Right;    ESOPHAGOGASTRODUODENOSCOPY N/A 2023    Procedure: EGD (ESOPHAGOGASTRODUODENOSCOPY);  Surgeon: Buster Sequeira MD;  Location: [facility] (4TH FLR);  Service: Endoscopy;  Laterality: N/A;  inst mailed-RB  5/10/23 no answer for precall/mleone lpn    POSTERIOR CERVICAL LAMINECTOMY Bilateral 2022    Procedure: LAMINECTOMY, SPINE, CERVICAL, POSTERIOR APPROACH C3-6;  Surgeon: Carlos Miller MD;  Location: [facility] OR 2ND FLR;  Service: Neurosurgery;  Laterality: Bilateral;    PROSTATECTOMY      REPAIR, HERNIA, INGUINAL, WITHOUT HISTORY OF PRIOR REPAIR, AGE 5 YEARS OR OLDER Right 10/10/2022    Procedure: REPAIR, HERNIA, INGUINAL, WITHOUT HISTORY OF PRIOR REPAIR, AGE 5 YEARS OR OLDER;  Surgeon: Dario Esquivel MD;  Location: [facility] OR 2ND FLR;  Service: General;  Laterality: Right;  open right inguinal hernia repair with mesh       SOCIAL HISTORY:  Social History     Socioeconomic History    Marital status:      Spouse name: Addis    Number of children: 2   Occupational History    Occupation: tool    Tobacco Use    Smoking status: Former     Current packs/day: 0.00     Average packs/day: 0.3 packs/day for 5.0 years (1.3 ttl pk-yrs)     Types: Cigarettes     Start date: 10/2/1957     Quit date: 10/2/1962     Years since quittin.9    Smokeless tobacco: Former    Tobacco comments:     quit age 23   Substance and Sexual Activity    Alcohol use: Not Currently    Drug use: No    Sexual activity: Not  Currently     Partners: Female     Social Determinants of Health     Financial Resource Strain: Low Risk  (12/4/2023)    Overall Financial Resource Strain (CARDIA)     Difficulty of Paying Living Expenses: Not hard at all   Food Insecurity: No Food Insecurity (12/4/2023)    Hunger Vital Sign     Worried About Running Out of Food in the Last Year: Never true     Ran Out of Food in the Last Year: Never true   Transportation Needs: No Transportation Needs (12/4/2023)    PRAPARE - Transportation     Lack of Transportation (Medical): No     Lack of Transportation (Non-Medical): No   Physical Activity: Insufficiently Active (12/4/2023)    Exercise Vital Sign     Days of Exercise per Week: 1 day     Minutes of Exercise per Session: 10 min   Stress: No Stress Concern Present (12/4/2023)    [employer] - Occupational Stress Questionnaire     Feeling of Stress : Not at all   Housing Stability: Unknown (12/4/2023)    Housing Stability Vital Sign     Unable to Pay for Housing in the Last Year: No     Unstable Housing in the Last Year: No       FAMILY HISTORY:  Family History   Problem Relation Name Age of Onset    Colon cancer Mother      Diabetes Mother      Heart disease Father      Mental illness Sister Rashida     Diabetes Sister Rashida     Other Brother Mayito         polio as a child    Pulmonary fibrosis Brother [place]     Diabetes Brother Benito     Myasthenia gravis Brother Benito     Parkinsonism Brother Fabio     Diabetes Brother Fabio     Dementia Brother Fabio     Lung cancer Brother Jennifer         smoker    Diabetes Maternal Aunt      Diabetes Other      Esophageal cancer Neg Hx         ALLERGIES AND MEDICATIONS: updated and reviewed.  Review of patient's allergies indicates:  No Known Allergies  Medication List with Changes/Refills   Current Medications    ALBUTEROL (PROAIR HFA) 90 MCG/ACTUATION INHALER    Inhale 1 puff into the lungs daily as needed for Wheezing (30 min prior to  arikacye). Rescue    ARIKAYCE 590 MG/8.4 ML NBSP        ASPIRIN (ECOTRIN) 81 MG EC TABLET    Take 81 mg by mouth once daily.    AZELASTINE (ASTELIN) 137 MCG (0.1 %) NASAL SPRAY    2 sprays (274 mcg total) by Nasal route 2 (two) times daily.    B COMPLEX VITAMINS CAPSULE    Take 1 capsule by mouth once daily.    DONEPEZIL (ARICEPT) 10 MG TABLET    TAKE 1 TABLET (10 MG TOTAL) BY MOUTH ONCE DAILY    ESCITALOPRAM OXALATE (LEXAPRO) 10 MG TABLET    Take 1.5 tablets (15 mg total) by mouth once daily.    ETHAMBUTOL (MYAMBUTOL) 400 MG TAB    Take 2 tablets (800 mg total) by mouth once daily.    INV CLOFAZIMINE CAPSULE    Take 2 capsules (100 mg total) by mouth once daily with meals. For Investigational Use Only. Patient Study Req ID# [id number]. Protocol: [id number]    MEMANTINE (NAMENDA) 5 MG TAB    Take 1 tablet (5 mg total) by mouth 2 (two) times daily. Initially 5mg daily for 1 week, and then increased to 5mg twice a day.    NEBULIZER AND COMPRESSOR ([email or web address] COMPRESSOR SYSTEM) DANIEL    use to administer nebulized medication as directed    PANTOPRAZOLE (PROTONIX) 40 MG TABLET    TAKE 1 TABLET(40 MG) BY MOUTH EVERY DAY 30 MINUTES BEFORE BREAKFAST    PRAVASTATIN (PRAVACHOL) 10 MG TABLET    TAKE 1 TABLET(10 MG) BY MOUTH EVERY EVENING    SODIUM CHLORIDE 3% 3 % NEBULIZER SOLUTION    USE 4 ML VIAL IN NEBULIZER  TWICE DAILY    SODIUM CHLORIDE 7% 7 % NEBULIZER SOLUTION    use one vial in nebulizer twice a day   Discontinued Medications    COMIRNATY 2023-24, 12Y UP,,PF, 30 MCG/0.3 ML INECTION        FLUZONE HIGHDOSE QUAD 23-24  MCG/0.7 ML SYRG             CARE TEAM:  Patient Care Team:  Portia Ferreira MD as PCP - General (Internal Medicine)  Dustin Mccarthy III, MD as Consulting Physician (Orthopedic Surgery)  Pierre Rogers MD as Consulting Physician (Urology)  Burak Gurrola MD as Consulting Physician (Pulmonary Disease)  Theo Alvares MD as Consulting Physician (Infectious Diseases)  Suzan Glass  "LPN as Care Coordinator  Gilberto Silva OD as Consulting Physician (Optometry)  Thien Joy MD (Inactive) as Consulting Physician (Gastroenterology)  Yuan Lundberg MD as Consulting Physician (Cardiology)  Neel Jimenez MD as Consulting Physician (Neurology)  Paola Rao MD as Consulting Physician (Infectious Diseases)  Fiorella Garcia NP as Nurse Practitioner (Urology)  Carlos Miller MD as Consulting Physician (Neurosurgery)  Ayesha Hall MD as Consulting Physician (Otolaryngology)  Deepa Toussaint FNP-C (Family Medicine)  Virginia Tobias CNS (Infectious Diseases)         SCREENING HISTORY:  Health Maintenance         Date Due Completion Date    RSV Vaccine (Age 60+ and Pregnant patients) (1 - 1-dose 60+ series) Never done ---    COVID-19 Vaccine (4 - 2023-24 season) 09/01/2023 11/5/2021    Influenza Vaccine (1) 09/01/2024 10/3/2023    Override on 10/19/2020: Done    TETANUS VACCINE 03/08/2025 3/8/2015    Hemoglobin A1c (Prediabetes) 03/20/2025 3/20/2024    Lipid Panel 03/20/2025 3/20/2024              REVIEW OF SYSTEMS:  The patient reports : fair dietary habits.  The patient reports  : that they recently started back on an exercise regimen  Review of Systems   Constitutional:  Negative for chills and fever.   Respiratory:  Negative for cough and shortness of breath.    Cardiovascular:  Negative for chest pain.   Gastrointestinal:  Negative for abdominal pain.   Genitourinary:  Negative for dysuria.   Neurological:  Positive for speech difficulty and memory loss.      ROS : patient denies: difficulty initiating urination          Objective    PHYSICAL EXAMINATION/VITALS:  Vitals:    08/26/24 1532   BP: (!) 92/58   BP Location: Left arm   Patient Position: Sitting   BP Method: Small (Manual)   Pulse: (!) 51   Temp: 97.5 °F (36.4 °C)   TempSrc: Oral   SpO2: 97%   Weight: 72.6 kg (159 lb 15.1 oz)   Height: 5' 8" (1.727 m)       Body mass index is 24.32 kg/m².    General " appearance - alert and in no distress, normal appearing weight  Psychiatric - alert, oriented to person and place, normal behavior, speech, dress, motor activity, fair memory  Eyes - pupils equal and reactive, extraocular eye movements intact, sclera anicteric  Lymphatics - no palpable cervical lymphadenopathy  Chest - clear to auscultation, no wheezes, rales or rhonchi, symmetric air entry  Heart - bradycardia with regular rhythm  Neurological - alert, no focal findings; cranial nerves II through XII intact; fair balance  Musculoskeletal - patient noted to have Moderate osteoarthritic changes to both knee joints. No joint effusions noted.  Extremities - no pedal edema noted  Skin - normal coloration, no suspicious skin lesions      LABS:  No labs needed at this time          ASSESSMENT AND PLAN:   1. Routine medical exam  Counseled on age appropriate medical preventative services including age appropriate cancer screenings, age appropriate eye and dental exams, over all nutritional health, need for a consistent exercise regimen, and an over all push towards maintaining a vigorous and active lifestyle.  Counseled on age appropriate vaccines and discussed upcoming health care needs based on age/gender. Discussed good sleep hygiene and stress management.    2. Other hyperlipidemia  Lab Results   Component Value Date    CHOL 131 03/20/2024     Lab Results   Component Value Date    HDL 38 (L) 03/20/2024     Lab Results   Component Value Date    LDLCALC 73.0 03/20/2024     Lab Results   Component Value Date    TRIG 100 03/20/2024     Lab Results   Component Value Date    LDLCALC 73.0 03/20/2024     We discussed low fat diet and regular exercise.The current medical regimen is effective;  continue present plan and medications.     3. Atherosclerosis of aorta  Patient with Atherosclerosis of the Aorta.  Stable/asymptomatic. Currently stable on lipid lowering medication and blood pressure monitoring.  Overview:  - noted on  CT chest 9/2018      4. Prediabetes  Lab Results   Component Value Date    HGBA1C 5.5 03/20/2024     Stable. We discussed low sugar/low carbohydrate diet and regular exercise to prevent progression. No need for prescription medication at this time.  Check A1c yearly.  Overview:  - hx of diet controlled type 2 diabetes - resolved 3/2022 due to A1c < 6.5 for at least 5 years without medication  - not on ace inhibitor  (has intermittent high potassium level)          5. Centrilobular emphysema  The current medical regimen is effective;  continue present plan and medications.   Overview:  - noted on CT of chest 9/2018.  Unable to perform pft.  No guzman with adl.   Will start a trial of albuterol.       6. Cavitary lung disease  The current medical regimen is effective;  continue present plan and medications.   Followed by: Pulmonology.   Overview:  rul cavitary lesion s/p fob.  Cytology from fob is negative for malignancy.  Sputum 10/3/18 with hillary. Lesion with thicker rim and contraction.  Await id inputs.        7. HILLARY (mycobacterium avium-intracellulare)  The current medical regimen is effective;  continue present plan and medications.  Patient's wife is aware of the risk of side effects of medications prescribed by pulmonology.  Followed by: Pulmonology.   Overview:  - diagnosed Fall 2018  - followed by ID  - no need for Rx treatment at this time per Dr. Clark.  In light of evolving rul cavitary lesion, will refer back to ID.  Dr. Clark no longer practice infectious disease.  ID appointment on 10/6/21      8. Mild pulmonary hypertension  Stable. Asymptomatic. Observe.  Overview:  12/2019 - The estimated PA systolic pressure is 30 mm Hg      9. Antiphospholipid syndrome  Stable/asymptomatic. Currently only on baby aspirin.  Overview:  APS IIb (+aCL Ab).  His DRVVT was positive for lupus anticoagulant on repeat testing (was not initially positive) [repeat hypercoag tests were done while off of  anticoagulation].  - h/o coumadin, paused after hematoma      10. Cognitive communication deficit  Stable. Continue current medication regimen. Followed by neurology. Wife has not yet attended dementia classes.           No orders of the defined types were placed in this encounter.      FOLLOW UP: Follow up in about 6 months (around 2/26/2025), or if symptoms worsen or fail to improve, for follow up chronic medical conditions.. or sooner as needed.

## 2024-08-19 ENCOUNTER — LAB VISIT (OUTPATIENT)
Dept: LAB | Facility: HOSPITAL | Age: 84
End: 2024-08-19
Attending: INTERNAL MEDICINE
Payer: MEDICARE

## 2024-08-19 DIAGNOSIS — A31.0 MYCOBACTERIUM AVIUM-INTRACELLULARE COMPLEX: ICD-10-CM

## 2024-08-19 PROCEDURE — 87206 SMEAR FLUORESCENT/ACID STAI: CPT | Performed by: INTERNAL MEDICINE

## 2024-08-19 PROCEDURE — 87116 MYCOBACTERIA CULTURE: CPT | Performed by: INTERNAL MEDICINE

## 2024-08-19 PROCEDURE — 87015 SPECIMEN INFECT AGNT CONCNTJ: CPT | Performed by: INTERNAL MEDICINE

## 2024-08-20 LAB
ACID FAST MOD KINY STN SPEC: NORMAL
MYCOBACTERIUM SPEC QL CULT: NORMAL

## 2024-08-23 ENCOUNTER — PATIENT OUTREACH (OUTPATIENT)
Dept: ADMINISTRATIVE | Facility: HOSPITAL | Age: 84
End: 2024-08-23
Payer: MEDICARE

## 2024-08-23 NOTE — PROGRESS NOTES
[facility] 1 DM MA Gap Report 08.19.24 1 Diabetes Urine - diabetes resolved 03/2022, patient diet controlled. His current status is prediabetes.

## 2024-08-24 RX ORDER — SODIUM CHLORIDE FOR INHALATION 7 %
VIAL, NEBULIZER (ML) INHALATION
Qty: 500 ML | Refills: 11 | Status: SHIPPED | OUTPATIENT
Start: 2024-08-24

## 2024-08-26 ENCOUNTER — OFFICE VISIT (OUTPATIENT)
Dept: FAMILY MEDICINE | Facility: CLINIC | Age: 84
End: 2024-08-26
Payer: MEDICARE

## 2024-08-26 VITALS
OXYGEN SATURATION: 97 % | WEIGHT: 159.94 LBS | BODY MASS INDEX: 24.24 KG/M2 | SYSTOLIC BLOOD PRESSURE: 92 MMHG | DIASTOLIC BLOOD PRESSURE: 58 MMHG | HEART RATE: 51 BPM | TEMPERATURE: 98 F | HEIGHT: 68 IN

## 2024-08-26 DIAGNOSIS — D68.61 ANTIPHOSPHOLIPID SYNDROME: ICD-10-CM

## 2024-08-26 DIAGNOSIS — E78.49 OTHER HYPERLIPIDEMIA: ICD-10-CM

## 2024-08-26 DIAGNOSIS — J43.2 CENTRILOBULAR EMPHYSEMA: ICD-10-CM

## 2024-08-26 DIAGNOSIS — Z00.00 ROUTINE MEDICAL EXAM: Primary | ICD-10-CM

## 2024-08-26 DIAGNOSIS — R73.03 PREDIABETES: ICD-10-CM

## 2024-08-26 DIAGNOSIS — I27.20 MILD PULMONARY HYPERTENSION: ICD-10-CM

## 2024-08-26 DIAGNOSIS — R41.841 COGNITIVE COMMUNICATION DEFICIT: ICD-10-CM

## 2024-08-26 DIAGNOSIS — J98.4 CAVITARY LUNG DISEASE: ICD-10-CM

## 2024-08-26 DIAGNOSIS — A31.0 MAI (MYCOBACTERIUM AVIUM-INTRACELLULARE): ICD-10-CM

## 2024-08-26 DIAGNOSIS — I70.0 ATHEROSCLEROSIS OF AORTA: ICD-10-CM

## 2024-08-26 PROCEDURE — 1159F MED LIST DOCD IN RCRD: CPT | Mod: CPTII,S$GLB,, | Performed by: INTERNAL MEDICINE

## 2024-08-26 PROCEDURE — 1160F RVW MEDS BY RX/DR IN RCRD: CPT | Mod: CPTII,S$GLB,, | Performed by: INTERNAL MEDICINE

## 2024-08-26 PROCEDURE — 1126F AMNT PAIN NOTED NONE PRSNT: CPT | Mod: CPTII,S$GLB,, | Performed by: INTERNAL MEDICINE

## 2024-08-26 PROCEDURE — 99999 PR PBB SHADOW E&M-EST. PATIENT-LVL IV: CPT | Mod: PBBFAC,,, | Performed by: INTERNAL MEDICINE

## 2024-08-26 PROCEDURE — 3288F FALL RISK ASSESSMENT DOCD: CPT | Mod: CPTII,S$GLB,, | Performed by: INTERNAL MEDICINE

## 2024-08-26 PROCEDURE — 1101F PT FALLS ASSESS-DOCD LE1/YR: CPT | Mod: CPTII,S$GLB,, | Performed by: INTERNAL MEDICINE

## 2024-08-26 PROCEDURE — 3074F SYST BP LT 130 MM HG: CPT | Mod: CPTII,S$GLB,, | Performed by: INTERNAL MEDICINE

## 2024-08-26 PROCEDURE — 99397 PER PM REEVAL EST PAT 65+ YR: CPT | Mod: S$GLB,,, | Performed by: INTERNAL MEDICINE

## 2024-08-26 PROCEDURE — 1157F ADVNC CARE PLAN IN RCRD: CPT | Mod: CPTII,S$GLB,, | Performed by: INTERNAL MEDICINE

## 2024-08-26 PROCEDURE — 3078F DIAST BP <80 MM HG: CPT | Mod: CPTII,S$GLB,, | Performed by: INTERNAL MEDICINE

## 2024-08-27 ENCOUNTER — LAB VISIT (OUTPATIENT)
Dept: LAB | Facility: HOSPITAL | Age: 84
End: 2024-08-27
Attending: INTERNAL MEDICINE
Payer: MEDICARE

## 2024-08-27 ENCOUNTER — OFFICE VISIT (OUTPATIENT)
Dept: INFECTIOUS DISEASES | Facility: CLINIC | Age: 84
End: 2024-08-27
Payer: MEDICARE

## 2024-08-27 ENCOUNTER — HOSPITAL ENCOUNTER (OUTPATIENT)
Dept: CARDIOLOGY | Facility: CLINIC | Age: 84
Discharge: HOME | End: 2024-08-27
Payer: MEDICARE

## 2024-08-27 VITALS
HEART RATE: 51 BPM | BODY MASS INDEX: 24.19 KG/M2 | DIASTOLIC BLOOD PRESSURE: 57 MMHG | SYSTOLIC BLOOD PRESSURE: 87 MMHG | HEIGHT: 68 IN | WEIGHT: 159.63 LBS

## 2024-08-27 DIAGNOSIS — A31.0 MYCOBACTERIUM AVIUM-INTRACELLULARE COMPLEX: ICD-10-CM

## 2024-08-27 DIAGNOSIS — A31.0 MYCOBACTERIUM AVIUM-INTRACELLULARE COMPLEX: Primary | ICD-10-CM

## 2024-08-27 LAB
ALBUMIN SERPL BCP-MCNC: 4 G/DL (ref 3.5–5.2)
ALP SERPL-CCNC: 67 U/L (ref 55–135)
ALT SERPL W/O P-5'-P-CCNC: 43 U/L (ref 10–44)
ANION GAP SERPL CALC-SCNC: 7 MMOL/L (ref 8–16)
AST SERPL-CCNC: 30 U/L (ref 10–40)
BILIRUB SERPL-MCNC: 0.4 MG/DL (ref 0.1–1)
BUN SERPL-MCNC: 13 MG/DL (ref 8–23)
CALCIUM SERPL-MCNC: 9.5 MG/DL (ref 8.7–10.5)
CHLORIDE SERPL-SCNC: 105 MMOL/L (ref 95–110)
CO2 SERPL-SCNC: 26 MMOL/L (ref 23–29)
CREAT SERPL-MCNC: 1.4 MG/DL (ref 0.5–1.4)
EST. GFR  (NO RACE VARIABLE): 50.5 ML/MIN/1.73 M^2
GLUCOSE SERPL-MCNC: 84 MG/DL (ref 70–110)
POTASSIUM SERPL-SCNC: 4.7 MMOL/L (ref 3.5–5.1)
PROT SERPL-MCNC: 7.3 G/DL (ref 6–8.4)
SODIUM SERPL-SCNC: 138 MMOL/L (ref 136–145)

## 2024-08-27 PROCEDURE — 99999 PR PBB SHADOW E&M-EST. PATIENT-LVL III: CPT | Mod: PBBFAC,,, | Performed by: INTERNAL MEDICINE

## 2024-08-27 PROCEDURE — 36415 COLL VENOUS BLD VENIPUNCTURE: CPT | Performed by: INTERNAL MEDICINE

## 2024-08-27 PROCEDURE — 3078F DIAST BP <80 MM HG: CPT | Mod: CPTII,S$GLB,, | Performed by: INTERNAL MEDICINE

## 2024-08-27 PROCEDURE — 1157F ADVNC CARE PLAN IN RCRD: CPT | Mod: CPTII,S$GLB,, | Performed by: INTERNAL MEDICINE

## 2024-08-27 PROCEDURE — 93010 ELECTROCARDIOGRAM REPORT: CPT | Mod: S$GLB,,, | Performed by: INTERNAL MEDICINE

## 2024-08-27 PROCEDURE — 1101F PT FALLS ASSESS-DOCD LE1/YR: CPT | Mod: CPTII,S$GLB,, | Performed by: INTERNAL MEDICINE

## 2024-08-27 PROCEDURE — 99215 OFFICE O/P EST HI 40 MIN: CPT | Mod: S$GLB,,, | Performed by: INTERNAL MEDICINE

## 2024-08-27 PROCEDURE — 1126F AMNT PAIN NOTED NONE PRSNT: CPT | Mod: CPTII,S$GLB,, | Performed by: INTERNAL MEDICINE

## 2024-08-27 PROCEDURE — 1159F MED LIST DOCD IN RCRD: CPT | Mod: CPTII,S$GLB,, | Performed by: INTERNAL MEDICINE

## 2024-08-27 PROCEDURE — 3074F SYST BP LT 130 MM HG: CPT | Mod: CPTII,S$GLB,, | Performed by: INTERNAL MEDICINE

## 2024-08-27 PROCEDURE — 3288F FALL RISK ASSESSMENT DOCD: CPT | Mod: CPTII,S$GLB,, | Performed by: INTERNAL MEDICINE

## 2024-08-27 PROCEDURE — 80053 COMPREHEN METABOLIC PANEL: CPT | Performed by: INTERNAL MEDICINE

## 2024-08-27 PROCEDURE — 93005 ELECTROCARDIOGRAM TRACING: CPT | Mod: S$GLB,,, | Performed by: INTERNAL MEDICINE

## 2024-08-27 NOTE — PROGRESS NOTES
INFECTIOUS DISEASE CLINIC  5/22/24  Subjective:      Chief Complaint:   Pulmonary MAC follow up     History of Present Illness:    Patient Diego Gunter is a 83 y.o. male  with h/o emphysema and cavitary MAC, alzheimers, here for MAC follow up. Denies complaints. Stable productive cough with some sputum production (spits up). Cough worse in the am. Sputum clear. Symptoms stable. Reports compliance with clofazamine and ethambutol. Recently reported ringing in ears, so arikayce paused. Reports ringing improved with stopping arikayce. Compliant with aerobika and inhaled hypertonic saline.       Reviewed micro:   Last positive culture 11/2023    First negative culture 4/1/2024      Organism     MYCOBACTERIUM AVIUM COMPLEX   Antibiotic                   TED (mcg/mL)  Interpretation   ----------------------------------------------------------   Clofazimine                          0.12   Moxifloxacin                            2       I   Clarithromycin                        >64       R   Amikacin (IV)                          64       R   Amikacin (liposomal, inhaled)          64       S   Linezolid                              32       R       4/17/23  Organism     MYCOBACTERIUM AVIUM COMPLEX   Antibiotic                   TED (mcg/mL)  Interpretation   ----------------------------------------------------------   Clofazimine                          0.12   Moxifloxacin                            2       I   Clarithromycin                        >64       R   Amikacin (IV)                          32       I   Amikacin (liposomal, inhaled)          32       S   Linezolid                              32       R                  Takes prilosec for reflux.     Quit smoking 57 y ago.     Had bronch 10/2018 for work up of lung mass     BRONCHIAL WASH CYTOLOGY:  NEGATIVE EXAM-NO NEOPLASIA IDENTIFIED  NO ORGANISMS IDENTIFIED WITH A GMS STAIN  THE CONTROL STAINED APPROPRIATEL              Review of  Symptoms:  Constitutional: Denies fevers, chills, or weakness.  ENT: Denies dysphagia, nasal discharge, ear pain or discharge.  Cardiovascular: Denies chest pain, palpitations, orthopnea, or claudication.  Respiratory: Denies shortness of breath, cough, hemoptysis, or wheezing.  GI: Denies nausea/vomitting, hematochezia, melena, abd pain, or changes in appetite.  : Denies dysuria, incontinence, or hematuria.  Musculoskeletal: Denies joint pain or myalgias.  Skin/breast: Denies rashes, lumps, lesions, or discharge.  Neurologic: Denies headache, dizziness, vertigo, or paresthesias.    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 Dr. Pham    Hx of colonic polyps     followed by GI. Dr. Pham    Hyperlipidemia associated with type 2 diabetes mellitus     Hyperlipidemia LDL goal <100     Hypernatremia 05/05/2022    Hypertension associated with type 2 diabetes mellitus     Hypertension associated with type 2 diabetes mellitus     Hypotension     Intermittent confusion 04/29/2022    Overweight(278.02)     Prostate cancer 09/2011    followed by urology, Dr. Rogers    Pulmonary embolism     Reducible right inguinal hernia 10/10/2022    Right hemiparesis 07/22/2022    Secondary to cervical hematoma    Type II or unspecified type diabetes mellitus without mention of complication, not stated as uncontrolled     diet controlled    Urinary retention 04/29/2022       Past Surgical History:   Procedure Laterality Date    BRONCHOSCOPY N/A 10/15/2018    Procedure: BRONCHOSCOPY;  Surgeon: Pratibha  Diagnostic Provider;  [facility];  Service: Anesthesiology;  Laterality: N/A;    CARPAL TUNNEL RELEASE Right 1/11/2023    Procedure: RELEASE, CARPAL TUNNEL;  Surgeon: Romero Lazo MD;  Location: [facility] OR;  Service: Orthopedics;  Laterality: Right;    CATARACT EXTRACTION, BILATERAL  2014    COLONOSCOPY N/A 5/16/2023    Procedure: COLONOSCOPY;  Surgeon: Buster Sequeira MD;  Location: [facility] (4TH FLR);  Service: Endoscopy;  Laterality: N/A;    DECOMPRESSION, NERVE, ULNAR Right 1/11/2023    Procedure: DECOMPRESSION, NERVE, ULNAR - right cub jolie and guyon's decompression as well as open right carpal tunnel release;  Surgeon: Romero Lazo MD;  Location: [facility] OR;  Service: Orthopedics;  Laterality: Right;    ESOPHAGOGASTRODUODENOSCOPY N/A 5/16/2023    Procedure: EGD (ESOPHAGOGASTRODUODENOSCOPY);  Surgeon: Buster Sequeira MD;  Location: [facility] (4TH FLR);  Service: Endoscopy;  Laterality: N/A;  inst mailed-RB  5/10/23 no answer for precall/mleone lpn    POSTERIOR CERVICAL LAMINECTOMY Bilateral 4/28/2022    Procedure: LAMINECTOMY, SPINE, CERVICAL, POSTERIOR APPROACH C3-6;  Surgeon: Carlos Miller MD;  [facility];  Service: Neurosurgery;  Laterality: Bilateral;    PROSTATECTOMY      REPAIR, HERNIA, INGUINAL, WITHOUT HISTORY OF PRIOR REPAIR, AGE 5 YEARS OR OLDER Right 10/10/2022    Procedure: REPAIR, HERNIA, INGUINAL, WITHOUT HISTORY OF PRIOR REPAIR, AGE 5 YEARS OR OLDER;  Surgeon: Dario Esquivel MD;  [address];  Service: General;  Laterality: Right;  open right inguinal hernia repair with mesh       Family History   Problem Relation Name Age of Onset    Colon cancer Mother      Diabetes Mother      Heart disease Father      Mental illness Sister Rashida     Diabetes Sister Rashida     Other Brother Mayito         polio as a child    Pulmonary fibrosis Brother Mayito     Diabetes Brother Benito     Myasthenia gravis Brother Benito     Parkinsonism Brother Fabio      Diabetes Brother Fabio     Dementia Brother Fabio     Lung cancer Brother Jennifer         smoker    Diabetes Maternal Aunt      Diabetes Other      Esophageal cancer Neg Hx         Social History     Socioeconomic History    Marital status:      Spouse name: Addis    Number of children: 2   Occupational History    Occupation: tool    Tobacco Use    Smoking status: Former     Current packs/day: 0.00     Average packs/day: 0.3 packs/day for 5.0 years (1.3 ttl pk-yrs)     Types: Cigarettes     Start date: 10/2/1957     Quit date: 10/2/1962     Years since quittin.9    Smokeless tobacco: Former    Tobacco comments:     quit age 23   Substance and Sexual Activity    Alcohol use: Not Currently    Drug use: No    Sexual activity: Not Currently     Partners: Female     Social Determinants of Health     Financial Resource Strain: Low Risk  (2023)    Overall Financial Resource Strain (CARDIA)     Difficulty of Paying Living Expenses: Not hard at all   Food Insecurity: No Food Insecurity (2023)    Hunger Vital Sign     Worried About Running Out of Food in the Last Year: Never true     Ran Out of Food in the Last Year: Never true   Transportation Needs: No Transportation Needs (2023)    PRAPARE - Transportation     Lack of Transportation (Medical): No     Lack of Transportation (Non-Medical): No   Physical Activity: Insufficiently Active (2023)    Exercise Vital Sign     Days of Exercise per Week: 1 day     Minutes of Exercise per Session: 10 min   Stress: No Stress Concern Present (2023)    [employer] - Occupational Stress Questionnaire     Feeling of Stress : Not at all   Housing Stability: Unknown (2023)    Housing Stability Vital Sign     Unable to Pay for Housing in the Last Year: No     Unstable Housing in the Last Year: No       Review of patient's allergies indicates:  No Known Allergies      Objective:   BP (!) 87/57 (BP Location:  "Left arm)   Pulse (!) 51   Ht 5' 8" (1.727 m)   Wt 72.4 kg (159 lb 9.8 oz)   BMI 24.27 kg/m²     General: Afebrile, alert, comfortable, no acute distress.   HEENT: TERELL. EOMI, no scleral icterus.   Pulmonary: Non labored,clear to auscultation A/P/L. No wheezing, crackles, or rhonchi.  Cardiac: normal S1 & S2 w/o rubs/murmurs/gallops.   Abdominal: Non-tender, non-distended.  Extremities: Moves all extremities x 4.   Skin: No jaundice, rashes, or visible lesions.   Neurological:  Alert and oriented x 4.     Labs:    Glucose   Date Value Ref Range Status   05/22/2024 85 70 - 110 mg/dL Final   02/21/2024 87 70 - 110 mg/dL Final   11/24/2023 75 70 - 110 mg/dL Final       Calcium   Date Value Ref Range Status   05/22/2024 9.3 8.7 - 10.5 mg/dL Final   02/21/2024 9.4 8.7 - 10.5 mg/dL Final   11/24/2023 9.2 8.7 - 10.5 mg/dL Final       Albumin   Date Value Ref Range Status   05/22/2024 3.8 3.5 - 5.2 g/dL Final   02/21/2024 4.2 3.5 - 5.2 g/dL Final   11/24/2023 3.8 3.5 - 5.2 g/dL Final       Total Protein   Date Value Ref Range Status   05/22/2024 7.0 6.0 - 8.4 g/dL Final   02/21/2024 7.6 6.0 - 8.4 g/dL Final   11/24/2023 7.1 6.0 - 8.4 g/dL Final       Sodium   Date Value Ref Range Status   05/22/2024 143 136 - 145 mmol/L Final   02/21/2024 141 136 - 145 mmol/L Final   11/24/2023 145 136 - 145 mmol/L Final       Potassium   Date Value Ref Range Status   05/22/2024 4.3 3.5 - 5.1 mmol/L Final   02/21/2024 4.5 3.5 - 5.1 mmol/L Final   11/24/2023 4.1 3.5 - 5.1 mmol/L Final       CO2   Date Value Ref Range Status   05/22/2024 28 23 - 29 mmol/L Final   02/21/2024 25 23 - 29 mmol/L Final   11/24/2023 28 23 - 29 mmol/L Final       Chloride   Date Value Ref Range Status   05/22/2024 108 95 - 110 mmol/L Final   02/21/2024 105 95 - 110 mmol/L Final   11/24/2023 108 95 - 110 mmol/L Final       BUN   Date Value Ref Range Status   05/22/2024 14 8 - 23 mg/dL Final   02/21/2024 10 8 - 23 mg/dL Final   11/24/2023 12 8 - 23 mg/dL Final "       Creatinine   Date Value Ref Range Status   05/22/2024 1.3 0.5 - 1.4 mg/dL Final   02/21/2024 1.1 0.5 - 1.4 mg/dL Final   11/24/2023 0.9 0.5 - 1.4 mg/dL Final       Alkaline Phosphatase   Date Value Ref Range Status   05/22/2024 66 55 - 135 U/L Final   02/21/2024 87 55 - 135 U/L Final   11/24/2023 71 55 - 135 U/L Final       ALT   Date Value Ref Range Status   05/22/2024 31 10 - 44 U/L Final   02/21/2024 36 10 - 44 U/L Final   11/24/2023 16 10 - 44 U/L Final       AST   Date Value Ref Range Status   05/22/2024 24 10 - 40 U/L Final   02/21/2024 36 10 - 40 U/L Final   11/24/2023 21 10 - 40 U/L Final       Total Bilirubin   Date Value Ref Range Status   05/22/2024 0.4 0.1 - 1.0 mg/dL Final     Comment:     For infants and newborns, interpretation of results should be based  on gestational age, weight and in agreement with clinical  observations.    Premature Infant recommended reference ranges:  Up to 24 hours.............<8.0 mg/dL  Up to 48 hours............<12.0 mg/dL  3-5 days..................<15.0 mg/dL  6-29 days.................<15.0 mg/dL     02/21/2024 0.5 0.1 - 1.0 mg/dL Final     Comment:     For infants and newborns, interpretation of results should be based  on gestational age, weight and in agreement with clinical  observations.    Premature Infant recommended reference ranges:  Up to 24 hours.............<8.0 mg/dL  Up to 48 hours............<12.0 mg/dL  3-5 days..................<15.0 mg/dL  6-29 days.................<15.0 mg/dL     11/24/2023 0.4 0.1 - 1.0 mg/dL Final     Comment:     For infants and newborns, interpretation of results should be based  on gestational age, weight and in agreement with clinical  observations.    Premature Infant recommended reference ranges:  Up to 24 hours.............<8.0 mg/dL  Up to 48 hours............<12.0 mg/dL  3-5 days..................<15.0 mg/dL  6-29 days.................<15.0 mg/dL         WBC   Date Value Ref Range Status   11/24/2023 6.77 3.90 -  "12.70 K/uL Final   11/04/2022 6.00 3.90 - 12.70 K/uL Final   10/21/2022 6.65 3.90 - 12.70 K/uL Final       Hemoglobin   Date Value Ref Range Status   11/24/2023 13.1 (L) 14.0 - 18.0 g/dL Final   11/04/2022 10.1 (L) 14.0 - 18.0 g/dL Final   10/21/2022 9.7 (L) 14.0 - 18.0 g/dL Final       POC Hematocrit   Date Value Ref Range Status   04/28/2022 34 (L) 36 - 54 %PCV Final   04/28/2022 42 36 - 54 %PCV Final     Hematocrit   Date Value Ref Range Status   11/24/2023 41.1 40.0 - 54.0 % Final   11/04/2022 31.2 (L) 40.0 - 54.0 % Final   10/21/2022 30.0 (L) 40.0 - 54.0 % Final       MCV   Date Value Ref Range Status   11/24/2023 88 82 - 98 fL Final   11/04/2022 86 82 - 98 fL Final   10/21/2022 84 82 - 98 fL Final       Platelets   Date Value Ref Range Status   11/24/2023 258 150 - 450 K/uL Final   11/04/2022 340 150 - 450 K/uL Final   10/21/2022 396 150 - 450 K/uL Final       Lab Results   Component Value Date    CHOL 131 03/20/2024    CHOL 135 03/08/2023    CHOL 116 (L) 04/28/2022       Lab Results   Component Value Date    HDL 38 (L) 03/20/2024    HDL 40 03/08/2023    HDL 36 (L) 04/28/2022       Lab Results   Component Value Date    LDLCALC 73.0 03/20/2024    LDLCALC 70.8 03/08/2023    LDLCALC 62.4 (L) 04/28/2022       Lab Results   Component Value Date    TRIG 100 03/20/2024    TRIG 121 03/08/2023    TRIG 88 04/28/2022       Lab Results   Component Value Date    CHOLHDL 29.0 03/20/2024    CHOLHDL 29.6 03/08/2023    CHOLHDL 31.0 04/28/2022       RPR   Date Value Ref Range Status   12/07/2023 Non-reactive Non-reactive Final   05/08/2022 Non-reactive Non-reactive Final   10/21/2019 Non-reactive Non-reactive Final     No results found for: "QUANTIFERON"    Medications:  Current Outpatient Medications on File Prior to Visit   Medication Sig Dispense Refill    albuterol (PROAIR HFA) 90 mcg/actuation inhaler Inhale 1 puff into the lungs daily as needed for Wheezing (30 min prior to arikacye). Rescue (Patient not taking: Reported " "on 8/26/2024) 18 g 6    ARIKAYCE 590 mg/8.4 mL NbSp  (Patient not taking: Reported on 8/26/2024)      aspirin (ECOTRIN) 81 MG EC tablet Take 81 mg by mouth once daily.      azelastine (ASTELIN) 137 mcg (0.1 %) nasal spray 2 sprays (274 mcg total) by Nasal route 2 (two) times daily. (Patient not taking: Reported on 8/26/2024) 30 mL 0    b complex vitamins capsule Take 1 capsule by mouth once daily.      donepeziL (ARICEPT) 10 MG tablet TAKE 1 TABLET (10 MG TOTAL) BY MOUTH ONCE DAILY 30 tablet 4    EScitalopram oxalate (LEXAPRO) 10 MG tablet Take 1.5 tablets (15 mg total) by mouth once daily. 145 tablet 1    ethambutoL (MYAMBUTOL) 400 MG Tab Take 2 tablets (800 mg total) by mouth once daily. 60 tablet 5    INV clofazimine capsule Take 2 capsules (100 mg total) by mouth once daily with meals. For Investigational Use Only. Patient Study Req ID# [id number]. Protocol: [id number] 200 capsule 0    memantine (NAMENDA) 5 MG Tab Take 1 tablet (5 mg total) by mouth 2 (two) times daily. Initially 5mg daily for 1 week, and then increased to 5mg twice a day. 60 tablet 11    nebulizer and compressor ([employer] COMPRESSOR SYSTEM) Marcy use to administer nebulized medication as directed 1 each 0    pantoprazole (PROTONIX) 40 MG tablet TAKE 1 TABLET(40 MG) BY MOUTH EVERY DAY 30 MINUTES BEFORE BREAKFAST 90 tablet 1    pravastatin (PRAVACHOL) 10 MG tablet TAKE 1 TABLET(10 MG) BY MOUTH EVERY EVENING 90 tablet 1    sodium chloride 3% 3 % nebulizer solution USE 4 ML VIAL IN NEBULIZER  TWICE DAILY 480 mL 11    sodium chloride 7% 7 % nebulizer solution use one vial in nebulizer twice a day 500 mL 11    [DISCONTINUED] omega-3 fatty acids 1,000 mg Cap Take 2 g by mouth.       No current facility-administered medications on file prior to visit.       Antibiotics:   Antibiotics (From admission, onward)      None            HIV: No components found for: "HIV 1/2 AG/AB"  Hepatitis C IgG: No components found for: "HEPATITIS C"  Syphilis:   RPR   Date " "Value Ref Range Status   12/07/2023 Non-reactive Non-reactive Final   05/08/2022 Non-reactive Non-reactive Final   10/21/2019 Non-reactive Non-reactive Final       Hepatitis A IgG: No components found for: "HEPATITIS A IGG"  Hepatitis Bc IgG: No components found for: "HEPATITIS B CORE IGG"  Hepatitis Bs IgG:  Quantiferon: No results found for: "QUANTIFERON"  VZV IgG: No components found for: "VARICELLA IGG"    No components found for: "SEDIMENTATION RATE"  No components found for: "C-REACTIVE PROTEIN"      Microbiology x 7d:   Microbiology Results (last 7 days)       ** No results found for the last 168 hours. **            Immunization History   Administered Date(s) Administered    COVID-19, MRNA, LN-S, PF (Pfizer) (Purple Cap) 01/13/2021, 04/28/2021, 11/05/2021    Influenza 10/19/2022    Influenza (FLUAD) - Trivalent - Adjuvanted - PF (65+) 09/25/2019    Influenza - High Dose - PF (65 years and older) 09/18/2015, 10/04/2016, 10/23/2017, 09/21/2018    Influenza - Quadrivalent - High Dose - PF (65 years and older) 11/05/2021, 10/18/2022, 10/03/2023    Influenza - Quadrivalent - PF *Preferred* (6 months and older) 10/06/2014    Influenza A (H1N1) 2009 Monovalent - IM 01/22/2010    Influenza Split 10/10/2011, 10/06/2014    Pneumococcal Conjugate - 13 Valent 10/28/2015    Pneumococcal Polysaccharide - 23 Valent 07/12/2013    Tdap 03/08/2015    Zoster Recombinant 09/25/2019, 12/30/2019         Reviewed records today as well as relevant labs, cultures, and imaging    Assessment:     Pulmonary MAC, macrolide resistant   Cavitary lung lesion  Nodular liver- noted on CT; US abdomen 10/2021 No compelling sonographic evidence of cirrhosis.  empysema  Antiphospholipid antibody syndrome; h/o coumadin, paused after hematoma       83M with emphysema, macrolide resistant cavitary pulm MAC  symptoms stable (minimal cough, weight stable). CT chest with stable cavitary lung lesion. sputum cultures persistently positive for MAC. First " "negative culture 4/2024. Unfortunately he is resistant to azithromycin and iv amikacin.  Previously tried to get omadacycline or tedezolid or bedqaqueline because insurance wont' cover ("not FDA approved for MAC") and cost too high to pay out of pocket    e[facility]consult to [facility]   They recommended adult day unit at [facility] to get an evaluation by their cardiothoracic surgeon (to see if they can offer you surgical resection of your infection). If you are able to travel to [place] and interested in this, please let me know and I'll call the physician line to get you referred. This is a different program than their regular 10 day program.      They also recommended continuing clofazamine, ethambutol, rifampin, and inhaled arikayce (and tezolid and bedaquiline if not cost prohibitive).          Risk factor-   structural: emphysema, bronchiectasis  functional: reflux        Qtc 407 on 3/8/23    The patient has numerous risk factors for disease progression including male gender, older age,  fibrocavitary disease, macrolide resistance, bronchiectasis. pulm disease from macrolide resistent mac has low cure rate independnetly of these other factors.    Goal of treatment is to prevent progression and not cure; not clear that pt is interested in surgery for removal of pulmonary cavitary lesion    No toxicities from antimicrobials  Extremely medically complex  Patient is high risk for infectious complications given medical comorbidities    Plan:     Continue clofazamine, ethambutol. Stop arikayce 2/2 side effects. Spoke with specialty pharmacy and they report the found copay card for $15/mo for omadacycline (but not sure how long the $15 payments will be good for). Rx sent. Counseled on side effects. Refill requested for clofazamaine through [employer]    While on ethambutol- self vision screens daily. If blurry vision lasts 2 days in a row, patient known to stop ethambutol and call eye doctor for exam and to " let us know that this happened.    Stressed importance of airway clearance. continue Aerobika and nebulized hypertonic saline bid.      Prevent reflux- avoid stomach sleeping. Famotidine/tums. Stop liquids 3hr before bedtime     Monitor afb sputum monthly     - Will monitor drug therapy for toxicity    Reviewed ct scan, stable. Pt interested in monitoring ~ q6 mo      I have sent communication to the referring physician and/or primary care provider.     I spent a total of 40 minutes on the day of the visit. This includes face to face time and non-face to face time preparing to see the patient (eg, review of tests), obtaining and/or reviewing separately obtained history, documenting clinical information in the electronic or other health record, independently interpreting results, and communicating results to the patient/family/caregiver, or care coordination.      Paola Rao MD, MPH  Infectious Disease    Orders Placed This Encounter   Procedures    Comprehensive Metabolic Panel     Standing Status:   Future     Number of Occurrences:   1     Standing Expiration Date:   8/27/2025     Order Specific Question:   Send normal result to authorizing provider's In Basket if patient is active on MyChart:     Answer:   Yes    ECG 12 lead     Standing Status:   Future     Number of Occurrences:   1     Standing Expiration Date:   8/27/2025

## 2024-08-28 LAB
OHS QRS DURATION: 92 MS
OHS QTC CALCULATION: 444 MS

## 2024-08-28 RX ORDER — ETHAMBUTOL HYDROCHLORIDE 400 MG/1
800 TABLET, FILM COATED ORAL DAILY
Qty: 60 TABLET | Refills: 5 | Status: SHIPPED | OUTPATIENT
Start: 2024-08-28 | End: 2025-08-28

## 2024-08-30 DIAGNOSIS — A31.0 MAI (MYCOBACTERIUM AVIUM-INTRACELLULARE): Primary | ICD-10-CM

## 2024-09-16 RX ORDER — ETHAMBUTOL HYDROCHLORIDE 400 MG/1
800 TABLET, FILM COATED ORAL DAILY
Qty: 60 TABLET | Refills: 5 | Status: SHIPPED | OUTPATIENT
Start: 2024-09-16 | End: 2025-09-16

## 2024-09-17 ENCOUNTER — TELEPHONE (OUTPATIENT)
Dept: INFECTIOUS DISEASES | Facility: CLINIC | Age: 84
End: 2024-09-17
Payer: MEDICARE

## 2024-09-17 ENCOUNTER — TELEPHONE (OUTPATIENT)
Dept: INFECTIOUS DISEASES | Facility: HOSPITAL | Age: 84
End: 2024-09-17
Payer: MEDICARE

## 2024-09-17 NOTE — TELEPHONE ENCOUNTER
Called pt an let the know that the pharmacy will be calling them sometime today.    ----- Message from Jane Mcgregor sent at 9/17/2024 11:54 AM CDT -----  Regarding: Pharm Auth  Contact: Addis [phone]  Addis/ spouse  calling to verify where to  NUZYRA medication. Pls call

## 2024-09-17 NOTE — TELEPHONE ENCOUNTER
Left pt wife Addis a voice message letting her know that  said to please reach out to [facility] at [phone] about Nuzyra medication.     Also left a MyChart message.

## 2024-09-17 NOTE — TELEPHONE ENCOUNTER
----- Message from Kingston Jarrell PharmD sent at 9/17/2024  8:38 AM CDT -----  Regarding: RE: Call From Addis Jani  Jose Angel, I will reach out to his wife about his Omadacycline.  It is currently being appealed with his insurance.  ----- Message -----  From: Paola Rao MD  Sent: 9/16/2024   4:38 PM CDT  To: CRYSTAL Jimenez; Sonia Eastman PharmD; #  Subject: RE: Call From Addis Gunter                   She's trying to get Omadacycline from [facility]  ----- Message -----  From: Nat Moy PharmD  Sent: 9/16/2024  10:10 AM CDT  To: CRYSTAL Jimenez; Sonia Eastman PharmD; #  Subject: Call From Addis Gunter                       Good morning Dr. Rao- we received a phone call transfer from the [facility] retail pharmacy to the investigational pharmacy from [place] in regards to Mr. Diego Gunter. They were routed to us regarding a non-research medication that Addis did not know the name of- she only knew it was from Dr. Rao- She asked me if it was ready for Mr. Diego Gunter but I did tell her that we are research pharmacy and will not be the pharmacy to contact regarding his non-study medications.  I am unsure of if there is a new medication or not as I called retail pharmacy and they told me they were unsure.  She requested a call back to number [phone] from his team.   Thank you  Nat Moy PharmD  [facility]  [phone]

## 2024-09-23 ENCOUNTER — LAB VISIT (OUTPATIENT)
Dept: LAB | Facility: HOSPITAL | Age: 84
End: 2024-09-23
Attending: INTERNAL MEDICINE
Payer: MEDICARE

## 2024-09-23 DIAGNOSIS — A31.0 MYCOBACTERIUM AVIUM-INTRACELLULARE COMPLEX: ICD-10-CM

## 2024-09-23 PROCEDURE — 87116 MYCOBACTERIA CULTURE: CPT | Performed by: INTERNAL MEDICINE

## 2024-09-23 PROCEDURE — 87206 SMEAR FLUORESCENT/ACID STAI: CPT | Performed by: INTERNAL MEDICINE

## 2024-09-23 PROCEDURE — 87015 SPECIMEN INFECT AGNT CONCNTJ: CPT | Performed by: INTERNAL MEDICINE

## 2024-09-24 ENCOUNTER — PATIENT MESSAGE (OUTPATIENT)
Dept: FAMILY MEDICINE | Facility: CLINIC | Age: 84
End: 2024-09-24
Payer: MEDICARE

## 2024-09-24 ENCOUNTER — TELEPHONE (OUTPATIENT)
Dept: INFECTIOUS DISEASES | Facility: CLINIC | Age: 84
End: 2024-09-24
Payer: MEDICARE

## 2024-09-24 DIAGNOSIS — F41.9 ANXIETY: ICD-10-CM

## 2024-09-24 LAB
ACID FAST MOD KINY STN SPEC: NORMAL
MYCOBACTERIUM SPEC QL CULT: NORMAL

## 2024-09-24 NOTE — TELEPHONE ENCOUNTER
Spoke with wife Addis, answered questions. Had some blood on toilet paper, resolved. asymptomatic        ----- Message from Corrina Kasper MA sent at 9/24/2024 11:20 AM CDT -----  Regarding: FW: pt advice  Contact: pt @[phone]    ----- Message -----  From: Juju Santoyo  Sent: 9/24/2024  11:09 AM CDT  To: Maira Guevara Staff  Subject: pt advice                                        Addis /Wife requesting a call back to discuss pt plan of care state pt having blood in stool please call pt to discuss pt @[phone]

## 2024-09-25 RX ORDER — ESCITALOPRAM OXALATE 10 MG/1
15 TABLET ORAL DAILY
Qty: 145 TABLET | Refills: 0 | Status: SHIPPED | OUTPATIENT
Start: 2024-09-25

## 2024-09-25 NOTE — TELEPHONE ENCOUNTER
No care due was identified.  [facility] Embedded Care Due Messages. Reference number: [id number].   9/24/2024 7:51:06 PM CDT

## 2024-09-28 RX ORDER — ETHAMBUTOL HYDROCHLORIDE 400 MG/1
800 TABLET, FILM COATED ORAL DAILY
Qty: 60 TABLET | Refills: 5 | Status: SHIPPED | OUTPATIENT
Start: 2024-09-28 | End: 2025-09-28

## 2024-10-22 ENCOUNTER — HOSPITAL ENCOUNTER (OUTPATIENT)
Dept: RADIOLOGY | Facility: HOSPITAL | Age: 84
Discharge: HOME | End: 2024-10-22
Attending: PHYSICIAN ASSISTANT
Payer: MEDICARE

## 2024-10-22 ENCOUNTER — OFFICE VISIT (OUTPATIENT)
Dept: ORTHOPEDICS | Facility: CLINIC | Age: 84
End: 2024-10-22
Payer: MEDICARE

## 2024-10-22 VITALS — BODY MASS INDEX: 23.93 KG/M2 | HEIGHT: 68 IN | WEIGHT: 157.88 LBS

## 2024-10-22 DIAGNOSIS — M17.12 PRIMARY OSTEOARTHRITIS OF LEFT KNEE: Primary | ICD-10-CM

## 2024-10-22 DIAGNOSIS — M25.562 ACUTE PAIN OF LEFT KNEE: ICD-10-CM

## 2024-10-22 PROCEDURE — 1160F RVW MEDS BY RX/DR IN RCRD: CPT | Mod: CPTII,S$GLB,, | Performed by: PHYSICIAN ASSISTANT

## 2024-10-22 PROCEDURE — 99999 PR PBB SHADOW E&M-EST. PATIENT-LVL IV: CPT | Mod: PBBFAC,,, | Performed by: PHYSICIAN ASSISTANT

## 2024-10-22 PROCEDURE — 73560 X-RAY EXAM OF KNEE 1 OR 2: CPT | Mod: TC,RT

## 2024-10-22 PROCEDURE — 1125F AMNT PAIN NOTED PAIN PRSNT: CPT | Mod: CPTII,S$GLB,, | Performed by: PHYSICIAN ASSISTANT

## 2024-10-22 PROCEDURE — 1157F ADVNC CARE PLAN IN RCRD: CPT | Mod: CPTII,S$GLB,, | Performed by: PHYSICIAN ASSISTANT

## 2024-10-22 PROCEDURE — 73562 X-RAY EXAM OF KNEE 3: CPT | Mod: 26,LT,, | Performed by: STUDENT IN AN ORGANIZED HEALTH CARE EDUCATION/TRAINING PROGRAM

## 2024-10-22 PROCEDURE — 1100F PTFALLS ASSESS-DOCD GE2>/YR: CPT | Mod: CPTII,S$GLB,, | Performed by: PHYSICIAN ASSISTANT

## 2024-10-22 PROCEDURE — 3288F FALL RISK ASSESSMENT DOCD: CPT | Mod: CPTII,S$GLB,, | Performed by: PHYSICIAN ASSISTANT

## 2024-10-22 PROCEDURE — 99214 OFFICE O/P EST MOD 30 MIN: CPT | Mod: S$GLB,,, | Performed by: PHYSICIAN ASSISTANT

## 2024-10-22 PROCEDURE — 73560 X-RAY EXAM OF KNEE 1 OR 2: CPT | Mod: 26,59,RT, | Performed by: STUDENT IN AN ORGANIZED HEALTH CARE EDUCATION/TRAINING PROGRAM

## 2024-10-22 PROCEDURE — 1159F MED LIST DOCD IN RCRD: CPT | Mod: CPTII,S$GLB,, | Performed by: PHYSICIAN ASSISTANT

## 2024-10-23 NOTE — PROGRESS NOTES
SUBJECTIVE:     Chief Complaint & History of Present Illness:  Diego Gunter is a Established patient 84 y.o. male who is seen here today with a complaint of    Chief Complaint   Patient presents with    Left Knee - Pain, Injury    .  He has patient well-known to me was last seen treated the clinic 03/03/2023 for adhesive capsulitis of the shoulder from which she is doing very well.  His concerns today revolve around some pain soreness in the inferior lateral aspect of the knee does not remember a specific trauma or injury but had difficulty with some swelling and decreased range of motion.  The swelling has since resolved he is having little to no symptoms at this point  On a scale of 1-10, with 10 being worst pain imaginable, he rates this pain as 1 on good days and 1 on bad days.  he describes the pain as achy.    Review of patient's allergies indicates:  No Known Allergies      Current Outpatient Medications   Medication Sig Dispense Refill    albuterol (PROAIR HFA) 90 mcg/actuation inhaler Inhale 1 puff into the lungs daily as needed for Wheezing (30 min prior to arikacye). Rescue 18 g 6    azelastine (ASTELIN) 137 mcg (0.1 %) nasal spray 2 sprays (274 mcg total) by Nasal route 2 (two) times daily. 30 mL 0    donepeziL (ARICEPT) 10 MG tablet TAKE 1 TABLET (10 MG TOTAL) BY MOUTH ONCE DAILY 30 tablet 4    EScitalopram oxalate (LEXAPRO) 10 MG tablet Take 1.5 tablets (15 mg total) by mouth once daily. 145 tablet 0    ethambutoL (MYAMBUTOL) 400 MG Tab Take 2 tablets (800 mg total) by mouth once daily. 60 tablet 5    INV clofazimine capsule Take 2 capsules (100 mg total) by mouth once daily with meals. For Investigational Use Only. Patient Study Req ID# [id number]. Protocol: [id number] Request ID:[id number] 200 capsule 0    INV clofazimine capsule Take 2 capsules (100 mg total) by mouth once daily. Take two capsules by mouth daily with meals. Investigational Medication. Patient Study Refill Request ID# [id number].  200 capsule 0    memantine (NAMENDA) 5 MG Tab Take 1 tablet (5 mg total) by mouth 2 (two) times daily. Initially 5mg daily for 1 week, and then increased to 5mg twice a day. 60 tablet 11    nebulizer and compressor ([employer] COMPRESSOR SYSTEM) Marcy use to administer nebulized medication as directed 1 each 0    omadacycline 150 mg Tab Take 2 tablets (300 mg total) by mouth once daily. Take on empty stomach (at least 4 hours after eating).  Avoid food and drink (except water) for 2 hours after taking Nuzyra.  Avoid dairy products and antacids for 4 hours after taking Nuzyra. 60 tablet 5    pantoprazole (PROTONIX) 40 MG tablet TAKE 1 TABLET(40 MG) BY MOUTH EVERY DAY 30 MINUTES BEFORE BREAKFAST 90 tablet 1    pravastatin (PRAVACHOL) 10 MG tablet TAKE 1 TABLET(10 MG) BY MOUTH EVERY EVENING 90 tablet 1    sodium chloride 7% 7 % nebulizer solution use one vial in nebulizer twice a day 500 mL 11    aspirin (ECOTRIN) 81 MG EC tablet Take 81 mg by mouth once daily.      b complex vitamins capsule Take 1 capsule by mouth once daily.      sodium chloride 3% 3 % nebulizer solution USE 4 ML VIAL IN NEBULIZER  TWICE DAILY 480 mL 11     No current facility-administered medications for this visit.       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 Dr. Pham    Hx of colonic polyps     followed by GI. Dr. Pham    Hyperlipidemia associated with type 2 diabetes mellitus     Hyperlipidemia LDL goal <100     Hypernatremia 05/05/2022    Hypertension associated with type 2 diabetes mellitus      Hypertension associated with type 2 diabetes mellitus     Hypotension     Intermittent confusion 04/29/2022    Overweight(278.02)     Prostate cancer 09/2011    followed by urology, Dr. Rogers    Pulmonary embolism     Reducible right inguinal hernia 10/10/2022    Right hemiparesis 07/22/2022    Secondary to cervical hematoma    Type II or unspecified type diabetes mellitus without mention of complication, not stated as uncontrolled     diet controlled    Urinary retention 04/29/2022       Past Surgical History:   Procedure Laterality Date    BRONCHOSCOPY N/A 10/15/2018    Procedure: BRONCHOSCOPY;  Surgeon: [facility] Provider;  Location: [facility] OR 2ND FLR;  Service: Anesthesiology;  Laterality: N/A;    CARPAL TUNNEL RELEASE Right 1/11/2023    Procedure: RELEASE, CARPAL TUNNEL;  Surgeon: Romero Lazo MD;  Location: [facility] OR;  Service: Orthopedics;  Laterality: Right;    CATARACT EXTRACTION, BILATERAL  2014    COLONOSCOPY N/A 5/16/2023    Procedure: COLONOSCOPY;  Surgeon: Buster Sequeira MD;  Location: [facility] ENDO (4TH FLR);  Service: Endoscopy;  Laterality: N/A;    DECOMPRESSION, NERVE, ULNAR Right 1/11/2023    Procedure: DECOMPRESSION, NERVE, ULNAR - right cub jolie and guyon's decompression as well as open right carpal tunnel release;  Surgeon: Romero Lazo MD;  Location: [facility] OR;  Service: Orthopedics;  Laterality: Right;    ESOPHAGOGASTRODUODENOSCOPY N/A 5/16/2023    Procedure: EGD (ESOPHAGOGASTRODUODENOSCOPY);  Surgeon: Buster Sequeira MD;  Location: [facility] (4TH FLR);  Service: Endoscopy;  Laterality: N/A;  inst mailed-RB  5/10/23 no answer for precall/mleone lpn    POSTERIOR CERVICAL LAMINECTOMY Bilateral 4/28/2022    Procedure: LAMINECTOMY, SPINE, CERVICAL, POSTERIOR APPROACH C3-6;  Surgeon: Carlos Miller MD;  Location: [facility] OR 2ND FLR;  Service: Neurosurgery;  Laterality: Bilateral;    PROSTATECTOMY      REPAIR, HERNIA, INGUINAL, WITHOUT HISTORY OF PRIOR REPAIR, AGE 5 YEARS OR OLDER Right 10/10/2022  "   Procedure: REPAIR, HERNIA, INGUINAL, WITHOUT HISTORY OF PRIOR REPAIR, AGE 5 YEARS OR OLDER;  Surgeon: Dario Esquivel MD;  Location: [facility] OR [address] [place] [address];  Service: General;  Laterality: Right;  open right inguinal hernia repair with mesh       Vital Signs (Most Recent)  There were no vitals filed for this visit.        Review of Systems:  ROS:  Constitutional: no fever or chills  Eyes: no visual changes  ENT: no nasal congestion or sore throat, positive neurosensory hearing loss  Respiratory: no cough or shortness of breath, pulmonary hypertension, centrilobular emphysema, cavitary lung disease  Cardiovascular: no chest pain or palpitations, bradycardia, aortic atherosclerosis  Gastrointestinal: no nausea or vomiting, tolerating diet, positive dysphagia, cholelithiasis  Genitourinary: no hematuria or dysuria, positive prostate cancer, anemia  Integument/Breast: no rash or pruritis  Hematologic/Lymphatic: no easy bruising or lymphadenopathy, positive pulmonary embolism, hypercoagulability state, antiphospholipid syndrome  Musculoskeletal: no arthralgias or myalgias  Neurological: no seizures or tremors, status post laminectomy spinal fusion, mild cognitive impairment, epidural hematoma, cubital tunnel syndrome of the right cognitive communication deficit, carpal tunnel syndrome of the right, MCA stroke  Behavioral/Psych: no auditory or visual hallucinations, positive for anxiety  Endocrine: no heat or cold intolerance, positive diabetes type 2              OBJECTIVE:     PHYSICAL EXAM:  Height: 5' 8" (172.7 cm) Weight: 71.6 kg (157 lb 13.6 oz), General Appearance: Well nourished, well developed, in no acute distress.  Neurological: Mood & affect are normal.    left  Knee Exam:  Knee Range of Motion:0-120 degrees flexion   Effusion:none  Condition of skin:intact  Location of tenderness:None   Strength:5 of 5  Stability:  Lachman: stable, LCL: stable, MCL: stable, PCL: stable, and posteromedial (dial): " stable  Varus /Valgus stress:  normal  Mindy:   negative/negative    right  Knee Exam:  Knee Range of Motion:0-125 degrees flexion   Effusion:none  Condition of skin:intact  Location of tenderness:None   Strength:5 of 5  Stability:  Lachman: stable, LCL: stable, MCL: stable, and PCL: stable  Varus /Valgus stress:  normal  Mindy:   negative/negative      Hip Examination:  normal    RADIOGRAPHS:  X-rays of the knees taken today films reviewed by me demonstrate moderate arthritic changes throughout both knees with significant medial joint space narrowing osteophytic spurring and sclerotic changes noted most obviously in the medial compartment moderate to severe chondromalacia patella with lateral riding patellas no evidence of fracture dislocation or other bony abnormalities    ASSESSMENT/PLAN:       ICD-10-CM ICD-9-CM   1. Acute pain of left knee  M25.562 719.46       Plan: We discussed with the patient at length all the different treatment options available for  the knee including anti-inflammatories, acetaminophen, rest, ice, knee strengthening exercise, occasional cortisone injections for temporary relief, Viscosupplimentation injections, arthroscopic debridement osteotomy, and finally knee arthroplasty.   Patient will continue with conservative care at this point Tylenol ice and rest follow-up p.r.n.

## 2024-10-24 ENCOUNTER — LAB VISIT (OUTPATIENT)
Dept: LAB | Facility: HOSPITAL | Age: 84
End: 2024-10-24
Attending: INTERNAL MEDICINE
Payer: MEDICARE

## 2024-10-24 DIAGNOSIS — A31.0 MYCOBACTERIUM AVIUM-INTRACELLULARE COMPLEX: ICD-10-CM

## 2024-10-24 PROCEDURE — 87015 SPECIMEN INFECT AGNT CONCNTJ: CPT | Performed by: INTERNAL MEDICINE

## 2024-10-24 PROCEDURE — 87116 MYCOBACTERIA CULTURE: CPT | Performed by: INTERNAL MEDICINE

## 2024-10-24 PROCEDURE — 87206 SMEAR FLUORESCENT/ACID STAI: CPT | Performed by: INTERNAL MEDICINE

## 2024-10-25 LAB
ACID FAST MOD KINY STN SPEC: NORMAL
MYCOBACTERIUM SPEC QL CULT: NORMAL

## 2024-10-29 ENCOUNTER — TELEPHONE (OUTPATIENT)
Dept: INFECTIOUS DISEASES | Facility: CLINIC | Age: 84
End: 2024-10-29
Payer: MEDICARE

## 2024-11-04 RX ORDER — ETHAMBUTOL HYDROCHLORIDE 400 MG/1
800 TABLET, FILM COATED ORAL DAILY
Qty: 60 TABLET | Refills: 5 | Status: SHIPPED | OUTPATIENT
Start: 2024-11-04 | End: 2025-11-04

## 2024-11-04 RX ORDER — SODIUM CHLORIDE FOR INHALATION 7 %
VIAL, NEBULIZER (ML) INHALATION
Qty: 500 ML | Refills: 11 | Status: SHIPPED | OUTPATIENT
Start: 2024-11-04

## 2024-11-18 ENCOUNTER — LAB VISIT (OUTPATIENT)
Dept: LAB | Facility: HOSPITAL | Age: 84
End: 2024-11-18
Attending: INTERNAL MEDICINE
Payer: MEDICARE

## 2024-11-18 DIAGNOSIS — A31.0 MYCOBACTERIUM AVIUM-INTRACELLULARE COMPLEX: ICD-10-CM

## 2024-11-18 PROCEDURE — 87206 SMEAR FLUORESCENT/ACID STAI: CPT | Performed by: INTERNAL MEDICINE

## 2024-11-18 PROCEDURE — 87015 SPECIMEN INFECT AGNT CONCNTJ: CPT | Performed by: INTERNAL MEDICINE

## 2024-11-18 PROCEDURE — 87116 MYCOBACTERIA CULTURE: CPT | Performed by: INTERNAL MEDICINE

## 2024-11-19 LAB
ACID FAST MOD KINY STN SPEC: NORMAL
MYCOBACTERIUM SPEC QL CULT: NORMAL

## 2024-11-21 ENCOUNTER — PATIENT MESSAGE (OUTPATIENT)
Dept: FAMILY MEDICINE | Facility: CLINIC | Age: 84
End: 2024-11-21
Payer: MEDICARE

## 2024-11-25 RX ORDER — ETHAMBUTOL HYDROCHLORIDE 400 MG/1
800 TABLET, FILM COATED ORAL DAILY
Qty: 60 TABLET | Refills: 5 | Status: SHIPPED | OUTPATIENT
Start: 2024-11-25 | End: 2025-11-25

## 2024-12-05 ENCOUNTER — PATIENT MESSAGE (OUTPATIENT)
Dept: RESEARCH | Facility: CLINIC | Age: 84
End: 2024-12-05
Payer: MEDICARE

## 2024-12-07 ENCOUNTER — PATIENT MESSAGE (OUTPATIENT)
Dept: ADMINISTRATIVE | Facility: OTHER | Age: 84
End: 2024-12-07
Payer: MEDICARE

## 2024-12-08 DIAGNOSIS — A31.0 MAI (MYCOBACTERIUM AVIUM-INTRACELLULARE): ICD-10-CM

## 2024-12-11 DIAGNOSIS — A31.0 MAI (MYCOBACTERIUM AVIUM-INTRACELLULARE): ICD-10-CM

## 2024-12-13 ENCOUNTER — DOCUMENTATION ONLY (OUTPATIENT)
Dept: RESEARCH | Facility: CLINIC | Age: 84
End: 2024-12-13
Payer: MEDICARE

## 2024-12-13 NOTE — PROGRESS NOTES
Lamprene (Clofazimine) Multiple Patient Program  Sponsor: [facility]  IRB/Protocol #: 2020.098  Principle Investigator: Dr. Paola Rao  Site Number: [id number]     Mr. 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 He came to ID clinic [id number] to  refill of Clofazimine that was dispensed by the [facility]. Instructed to continue to take as prescribed: 100 mg by mouth once a day with food. Please reach out to ID clinic when next refill needed and call if any questions or concerns.

## 2024-12-19 ENCOUNTER — LAB VISIT (OUTPATIENT)
Dept: LAB | Facility: HOSPITAL | Age: 84
End: 2024-12-19
Attending: INTERNAL MEDICINE
Payer: MEDICARE

## 2024-12-19 DIAGNOSIS — A31.0 MYCOBACTERIUM AVIUM-INTRACELLULARE COMPLEX: ICD-10-CM

## 2024-12-19 PROCEDURE — 87116 MYCOBACTERIA CULTURE: CPT | Performed by: INTERNAL MEDICINE

## 2024-12-19 PROCEDURE — 87206 SMEAR FLUORESCENT/ACID STAI: CPT | Performed by: INTERNAL MEDICINE

## 2024-12-19 PROCEDURE — 87015 SPECIMEN INFECT AGNT CONCNTJ: CPT | Performed by: INTERNAL MEDICINE

## 2024-12-20 LAB
ACID FAST MOD KINY STN SPEC: NORMAL
MYCOBACTERIUM SPEC QL CULT: NORMAL

## 2024-12-29 DIAGNOSIS — R13.12 OROPHARYNGEAL DYSPHAGIA: Primary | ICD-10-CM

## 2024-12-29 DIAGNOSIS — F41.9 ANXIETY: ICD-10-CM

## 2024-12-29 DIAGNOSIS — E78.5 HYPERLIPIDEMIA LDL GOAL <100: ICD-10-CM

## 2024-12-29 RX ORDER — ESCITALOPRAM OXALATE 10 MG/1
TABLET ORAL
Qty: 145 TABLET | Refills: 0 | OUTPATIENT
Start: 2024-12-29

## 2024-12-29 NOTE — TELEPHONE ENCOUNTER
No care due was identified.  [facility] Embedded Care Due Messages. Reference number: [id number].   12/29/2024 12:20:16 PM CST

## 2024-12-29 NOTE — TELEPHONE ENCOUNTER
No care due was identified.  [facility] Embedded Care Due Messages. Reference number: [id number].   12/29/2024 12:21:05 PM CST

## 2024-12-29 NOTE — TELEPHONE ENCOUNTER
No care due was identified.  [facility] Embedded Care Due Messages. Reference number: [id number].   12/29/2024 12:33:05 PM CST

## 2024-12-29 NOTE — TELEPHONE ENCOUNTER
Care Due:                  Date            Visit Type   Department     Provider  --------------------------------------------------------------------------------                                EP -         [facility]                              PRIMARY      [facility]  Last Visit: 08-      CARE ([facility])   MED/ PEDS      Josekelconnor Ferreira                              [facility]                              PRIMARY      MED/ INTERNAL  [facility]  Next Visit: 06-      CARE ([facility])   MED/ PEDS      Jsoekeler  Hanna                                                            Last  Test          Frequency    Reason                     Performed    Due Date  --------------------------------------------------------------------------------    Lipid Panel.  12 months..  pravastatin..............  03-   03-    [employer] Embedded Care Due Messages. Reference number: [id number].   12/29/2024 3:36:25 AM CST

## 2024-12-30 ENCOUNTER — TELEPHONE (OUTPATIENT)
Dept: FAMILY MEDICINE | Facility: CLINIC | Age: 84
End: 2024-12-30
Payer: MEDICARE

## 2024-12-30 RX ORDER — ETHAMBUTOL HYDROCHLORIDE 400 MG/1
800 TABLET, FILM COATED ORAL DAILY
Qty: 60 TABLET | Refills: 5 | Status: SHIPPED | OUTPATIENT
Start: 2024-12-30 | End: 2025-12-30

## 2024-12-30 RX ORDER — PRAVASTATIN SODIUM 10 MG/1
10 TABLET ORAL NIGHTLY
Qty: 90 TABLET | Refills: 0 | Status: SHIPPED | OUTPATIENT
Start: 2024-12-30

## 2024-12-30 NOTE — TELEPHONE ENCOUNTER
Refill Routing Note   Medication(s) are not appropriate for processing by [facility] for the following reason(s):        Drug-drug interaction    [facility] action(s):  Defer      Medication Therapy Plan: donepeziL and EScitalopram oxalate      Appointments  past 12m or future 3m with PCP    Date Provider   Last Visit   8/26/2024 Portia Ferreira MD   Next Visit   12/29/2024 Portia Ferreira MD   ED visits in past 90 days: 0        Note composed:10:09 PM 12/29/2024

## 2024-12-30 NOTE — TELEPHONE ENCOUNTER
Provider Staff:  Action required for this patient    Requires labs      Please see care gap opportunities below in Care Due Message.    Thanks!  [facility]     Appointments      Date Provider   Last Visit   8/26/2024 Portia Ferreira MD   Next Visit   12/29/2024 Portia Ferreira MD     Refill Decision Note   Diego Gunter  is requesting a refill authorization.  Brief Assessment and Rationale for Refill:  Quick Discontinue     Medication Therapy Plan:  FOVS      Comments:     Note composed:10:09 PM 12/29/2024

## 2024-12-30 NOTE — TELEPHONE ENCOUNTER
----- Message from Toni sent at 12/30/2024  3:29 PM CST -----  Regarding: wife  Type: RX Refill Request    Who called: Wife-Addis  Have you contacted your pharmacy:yes  Refill or New Rx:refill  RX name and strength: EScitalopram oxalate (LEXAPRO) 10 MG tablet  :  Preferred pharmacy and phone number:   [id number] - [address] - [employer] [address] [facility] [address] [place] [address]  Phone: [phone] Fax: [phone]        Ordering provider:Hanna Flaherty call back number: [phone]    Additional information:

## 2024-12-30 NOTE — TELEPHONE ENCOUNTER
Refill Decision Note   Diego Gunter  is requesting a refill authorization.  Brief Assessment and Rationale for Refill:  Approve     Medication Therapy Plan:         Comments:     Note composed:12:17 PM 12/30/2024

## 2024-12-31 RX ORDER — PANTOPRAZOLE SODIUM 40 MG/1
40 TABLET, DELAYED RELEASE ORAL DAILY
Qty: 90 TABLET | Refills: 0 | Status: SHIPPED | OUTPATIENT
Start: 2024-12-31

## 2024-12-31 RX ORDER — ESCITALOPRAM OXALATE 10 MG/1
15 TABLET ORAL DAILY
Qty: 135 TABLET | Refills: 1 | Status: SHIPPED | OUTPATIENT
Start: 2024-12-31

## 2025-01-03 DIAGNOSIS — E78.5 HYPERLIPIDEMIA LDL GOAL <100: ICD-10-CM

## 2025-01-03 NOTE — TELEPHONE ENCOUNTER
No care due was identified.  [facility] Embedded Care Due Messages. Reference number: [id number].   1/03/2025 3:58:41 PM CST

## 2025-01-04 ENCOUNTER — PATIENT MESSAGE (OUTPATIENT)
Dept: ADMINISTRATIVE | Facility: OTHER | Age: 85
End: 2025-01-04
Payer: MEDICARE

## 2025-01-04 RX ORDER — PRAVASTATIN SODIUM 10 MG/1
10 TABLET ORAL NIGHTLY
Qty: 90 TABLET | Refills: 0 | OUTPATIENT
Start: 2025-01-04

## 2025-01-05 NOTE — TELEPHONE ENCOUNTER
Refill Decision Note   Diegonaomi Leekelly  is requesting a refill authorization.  Brief Assessment and Rationale for Refill:  Quick Discontinue     Medication Therapy Plan:  Receipt confirmed by pharmacy (12/30/2024 12:17 PM CST)      Comments:     Note composed:11:32 PM 01/04/2025

## 2025-01-08 DIAGNOSIS — G30.9 ALZHEIMER DISEASE: ICD-10-CM

## 2025-01-08 DIAGNOSIS — G31.84 MILD COGNITIVE IMPAIRMENT: ICD-10-CM

## 2025-01-08 DIAGNOSIS — F02.80 ALZHEIMER DISEASE: ICD-10-CM

## 2025-01-08 RX ORDER — MEMANTINE HYDROCHLORIDE 5 MG/1
5 TABLET ORAL 2 TIMES DAILY
Qty: 60 TABLET | Refills: 11 | Status: CANCELLED | OUTPATIENT
Start: 2025-01-08 | End: 2026-01-08

## 2025-01-13 DIAGNOSIS — F02.80 ALZHEIMER DISEASE: ICD-10-CM

## 2025-01-13 DIAGNOSIS — G30.9 ALZHEIMER DISEASE: ICD-10-CM

## 2025-01-13 DIAGNOSIS — G31.84 MILD COGNITIVE IMPAIRMENT: ICD-10-CM

## 2025-01-13 RX ORDER — DONEPEZIL HYDROCHLORIDE 10 MG/1
TABLET, FILM COATED ORAL
Qty: 30 TABLET | Refills: 4 | Status: SHIPPED | OUTPATIENT
Start: 2025-01-13

## 2025-01-13 RX ORDER — MEMANTINE HYDROCHLORIDE 5 MG/1
5 TABLET ORAL 2 TIMES DAILY
Qty: 60 TABLET | Refills: 11 | Status: SHIPPED | OUTPATIENT
Start: 2025-01-13 | End: 2026-01-13

## 2025-01-17 ENCOUNTER — PATIENT MESSAGE (OUTPATIENT)
Dept: RESEARCH | Facility: CLINIC | Age: 85
End: 2025-01-17
Payer: MEDICARE

## 2025-01-28 ENCOUNTER — LAB VISIT (OUTPATIENT)
Dept: LAB | Facility: HOSPITAL | Age: 85
End: 2025-01-28
Attending: INTERNAL MEDICINE
Payer: MEDICARE

## 2025-01-28 DIAGNOSIS — A31.0 MYCOBACTERIUM AVIUM-INTRACELLULARE COMPLEX: ICD-10-CM

## 2025-01-28 PROCEDURE — 87116 MYCOBACTERIA CULTURE: CPT | Performed by: INTERNAL MEDICINE

## 2025-01-28 PROCEDURE — 87015 SPECIMEN INFECT AGNT CONCNTJ: CPT | Performed by: INTERNAL MEDICINE

## 2025-01-28 PROCEDURE — 87206 SMEAR FLUORESCENT/ACID STAI: CPT | Performed by: INTERNAL MEDICINE

## 2025-01-29 LAB
ACID FAST MOD KINY STN SPEC: NORMAL
MYCOBACTERIUM SPEC QL CULT: NORMAL

## 2025-02-03 RX ORDER — ETHAMBUTOL HYDROCHLORIDE 400 MG/1
800 TABLET, FILM COATED ORAL DAILY
Qty: 60 TABLET | Refills: 5 | Status: SHIPPED | OUTPATIENT
Start: 2025-02-03 | End: 2026-02-03

## 2025-02-18 ENCOUNTER — LAB VISIT (OUTPATIENT)
Dept: LAB | Facility: HOSPITAL | Age: 85
End: 2025-02-18
Attending: INTERNAL MEDICINE
Payer: MEDICARE

## 2025-02-18 DIAGNOSIS — A31.0 MYCOBACTERIUM AVIUM-INTRACELLULARE COMPLEX: ICD-10-CM

## 2025-02-18 PROCEDURE — 87206 SMEAR FLUORESCENT/ACID STAI: CPT | Performed by: INTERNAL MEDICINE

## 2025-02-18 PROCEDURE — 87015 SPECIMEN INFECT AGNT CONCNTJ: CPT | Performed by: INTERNAL MEDICINE

## 2025-02-18 PROCEDURE — 87116 MYCOBACTERIA CULTURE: CPT | Performed by: INTERNAL MEDICINE

## 2025-02-19 LAB
ACID FAST MOD KINY STN SPEC: NORMAL
MYCOBACTERIUM SPEC QL CULT: NORMAL

## 2025-02-24 ENCOUNTER — PATIENT MESSAGE (OUTPATIENT)
Dept: ADMINISTRATIVE | Facility: OTHER | Age: 85
End: 2025-02-24
Payer: MEDICARE

## 2025-02-24 DIAGNOSIS — A31.0 MYCOBACTERIUM AVIUM-INTRACELLULARE COMPLEX: ICD-10-CM

## 2025-02-24 RX ORDER — NEBULIZER AND COMPRESSOR
1 EACH MISCELLANEOUS 2 TIMES DAILY
Qty: 1 EACH | Refills: 0 | Status: SHIPPED | OUTPATIENT
Start: 2025-02-24

## 2025-02-25 RX ORDER — SODIUM CHLORIDE FOR INHALATION 7 %
VIAL, NEBULIZER (ML) INHALATION
Qty: 500 ML | Refills: 11 | Status: SHIPPED | OUTPATIENT
Start: 2025-02-25

## 2025-03-06 RX ORDER — ETHAMBUTOL HYDROCHLORIDE 400 MG/1
800 TABLET, FILM COATED ORAL DAILY
Qty: 60 TABLET | Refills: 5 | Status: SHIPPED | OUTPATIENT
Start: 2025-03-06 | End: 2026-03-06

## 2025-03-18 ENCOUNTER — LAB VISIT (OUTPATIENT)
Dept: LAB | Facility: HOSPITAL | Age: 85
End: 2025-03-18
Attending: INTERNAL MEDICINE
Payer: MEDICARE

## 2025-03-18 DIAGNOSIS — A31.0 MYCOBACTERIUM AVIUM-INTRACELLULARE COMPLEX: ICD-10-CM

## 2025-03-18 PROCEDURE — 87116 MYCOBACTERIA CULTURE: CPT | Performed by: INTERNAL MEDICINE

## 2025-03-18 PROCEDURE — 87015 SPECIMEN INFECT AGNT CONCNTJ: CPT | Performed by: INTERNAL MEDICINE

## 2025-03-18 PROCEDURE — 87206 SMEAR FLUORESCENT/ACID STAI: CPT | Performed by: INTERNAL MEDICINE

## 2025-03-19 LAB
ACID FAST MOD KINY STN SPEC: NORMAL
MYCOBACTERIUM SPEC QL CULT: NORMAL

## 2025-03-20 DIAGNOSIS — A31.0 MYCOBACTERIUM AVIUM-INTRACELLULARE COMPLEX: ICD-10-CM

## 2025-03-20 RX ORDER — OMADACYCLINE 150 MG/1
300 TABLET, FILM COATED ORAL DAILY
Qty: 60 TABLET | Refills: 5 | Status: ACTIVE | OUTPATIENT
Start: 2025-03-20 | End: 2026-03-20

## 2025-03-24 DIAGNOSIS — Z00.00 ENCOUNTER FOR MEDICARE ANNUAL WELLNESS EXAM: ICD-10-CM

## 2025-03-28 DIAGNOSIS — A31.0 MAI (MYCOBACTERIUM AVIUM-INTRACELLULARE): Primary | ICD-10-CM

## 2025-03-28 RX ORDER — ETHAMBUTOL HYDROCHLORIDE 400 MG/1
800 TABLET, FILM COATED ORAL DAILY
Qty: 60 TABLET | Refills: 5 | Status: SHIPPED | OUTPATIENT
Start: 2025-03-28 | End: 2026-03-28

## 2025-03-28 NOTE — PROGRESS NOTES
Lamprene (Clofazimine) Multiple Patient Program  [facility]  IRB/Protocol #: 2020.098  Principle Investigator: Dr. Paola Rao  Site Number: [id number]     Mr. 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 He came to ID clinic today, 28MAR2025, to  refill of drug that was dispensed by the [facility]. Instructed to continue to take as prescribed: 100 mg by mouth once a day with food. Please reach out to ID clinic when next refill needed and call if any questions or concerns.

## 2025-04-03 ENCOUNTER — TELEPHONE (OUTPATIENT)
Dept: FAMILY MEDICINE | Facility: CLINIC | Age: 85
End: 2025-04-03
Payer: MEDICARE

## 2025-04-03 ENCOUNTER — HOSPITAL ENCOUNTER (EMERGENCY)
Facility: HOSPITAL | Age: 85
Discharge: HOME | End: 2025-04-03
Attending: EMERGENCY MEDICINE
Payer: MEDICARE

## 2025-04-03 VITALS
DIASTOLIC BLOOD PRESSURE: 51 MMHG | WEIGHT: 157 LBS | HEART RATE: 68 BPM | RESPIRATION RATE: 20 BRPM | BODY MASS INDEX: 23.79 KG/M2 | HEIGHT: 68 IN | SYSTOLIC BLOOD PRESSURE: 114 MMHG | TEMPERATURE: 99 F | OXYGEN SATURATION: 97 %

## 2025-04-03 DIAGNOSIS — R05.9 COUGH: ICD-10-CM

## 2025-04-03 DIAGNOSIS — R13.12 OROPHARYNGEAL DYSPHAGIA: ICD-10-CM

## 2025-04-03 DIAGNOSIS — J06.9 VIRAL URI WITH COUGH: Primary | ICD-10-CM

## 2025-04-03 LAB
CTP QC/QA: YES
INFLUENZA A ANTIGEN, POC: NEGATIVE
INFLUENZA B ANTIGEN, POC: NEGATIVE
POC RAPID STREP A: NEGATIVE
POCT GLUCOSE: 99 MG/DL (ref 70–110)
SARS-COV-2 RDRP RESP QL NAA+PROBE: NEGATIVE

## 2025-04-03 PROCEDURE — 82962 GLUCOSE BLOOD TEST: CPT | Mod: ER

## 2025-04-03 PROCEDURE — 25000003 PHARM REV CODE 250: Mod: ER | Performed by: EMERGENCY MEDICINE

## 2025-04-03 PROCEDURE — 87635 SARS-COV-2 COVID-19 AMP PRB: CPT | Mod: ER | Performed by: NURSE PRACTITIONER

## 2025-04-03 PROCEDURE — 94640 AIRWAY INHALATION TREATMENT: CPT | Mod: ER

## 2025-04-03 PROCEDURE — 87804 INFLUENZA ASSAY W/OPTIC: CPT | Mod: 59,ER

## 2025-04-03 PROCEDURE — 87880 STREP A ASSAY W/OPTIC: CPT | Mod: ER

## 2025-04-03 PROCEDURE — 25000242 PHARM REV CODE 250 ALT 637 W/ HCPCS: Mod: ER | Performed by: EMERGENCY MEDICINE

## 2025-04-03 PROCEDURE — 99283 EMERGENCY DEPT VISIT LOW MDM: CPT | Mod: 25,ER

## 2025-04-03 RX ORDER — IPRATROPIUM BROMIDE AND ALBUTEROL SULFATE 2.5; .5 MG/3ML; MG/3ML
3 SOLUTION RESPIRATORY (INHALATION) ONCE
Status: COMPLETED | OUTPATIENT
Start: 2025-04-03 | End: 2025-04-03

## 2025-04-03 RX ORDER — FLUTICASONE PROPIONATE 50 MCG
1 SPRAY, SUSPENSION (ML) NASAL 2 TIMES DAILY
Qty: 16 G | Refills: 0 | Status: SHIPPED | OUTPATIENT
Start: 2025-04-03

## 2025-04-03 RX ORDER — VITAMIN A 3000 MCG
1 CAPSULE ORAL
Qty: 30 ML | Refills: 0 | Status: SHIPPED | OUTPATIENT
Start: 2025-04-03

## 2025-04-03 RX ORDER — ACETAMINOPHEN 325 MG/1
650 TABLET ORAL
Status: COMPLETED | OUTPATIENT
Start: 2025-04-03 | End: 2025-04-03

## 2025-04-03 RX ORDER — BENZONATATE 100 MG/1
200 CAPSULE ORAL
Status: COMPLETED | OUTPATIENT
Start: 2025-04-03 | End: 2025-04-03

## 2025-04-03 RX ORDER — LEVOCETIRIZINE DIHYDROCHLORIDE 5 MG/1
5 TABLET, FILM COATED ORAL NIGHTLY
Qty: 30 TABLET | Refills: 0 | Status: SHIPPED | OUTPATIENT
Start: 2025-04-03 | End: 2026-04-03

## 2025-04-03 RX ORDER — BENZONATATE 200 MG/1
200 CAPSULE ORAL 3 TIMES DAILY PRN
Qty: 30 CAPSULE | Refills: 0 | Status: SHIPPED | OUTPATIENT
Start: 2025-04-03 | End: 2025-04-13

## 2025-04-03 RX ORDER — ACETAMINOPHEN 500 MG
500 TABLET ORAL EVERY 6 HOURS PRN
Qty: 30 TABLET | Refills: 0 | Status: SHIPPED | OUTPATIENT
Start: 2025-04-03

## 2025-04-03 RX ADMIN — BENZONATATE 200 MG: 100 CAPSULE ORAL at 04:04

## 2025-04-03 RX ADMIN — ACETAMINOPHEN 650 MG: 325 TABLET ORAL at 04:04

## 2025-04-03 RX ADMIN — IPRATROPIUM BROMIDE AND ALBUTEROL SULFATE 3 ML: .5; 3 SOLUTION RESPIRATORY (INHALATION) at 04:04

## 2025-04-03 NOTE — TELEPHONE ENCOUNTER
----- Message from TOMODO sent at 4/3/2025 10:31 AM CDT -----  Who called:selfWhat is the request in detail: requesting to know If there is anything that he can take over the counter the next available appt is not until April 7Can the clinic reply by [facility]?noWould the patient rather a call back or a response via [phone] Information:Symptoms: Cough, Sore ThroatOutcome: Schedule an appointment to be seen within 24 hours.Reason: Caller denied all higher acuity questionsThe caller accepted this outcome.

## 2025-04-03 NOTE — ED PROVIDER NOTES
Encounter Date: 4/3/2025    SCRIBE #1 NOTE: I, Michael Leyva Do, am scribing for, and in the presence of,  Ronda Marc DO. I have scribed the following portions of the note - Other sections scribed: HPI, ROS, PE, MDM.       History     Chief Complaint   Patient presents with    Cough     Pt reports increased cough today with sore throat     84 y.o. male with a pertinent PMHx of anemia, COPD, HLD, HTN, and diabetes, who presents to the ED for chief complaint of sore throat onset about 12.5 hours ago. Patient reports waking up with this episode. He also reports productive cough. 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 He states attempted treatment with his nebulizer today. No other exacerbating or alleviating factors. Patient denies any fever, congestion, or other associated symptoms.     Per chart review 02/19/2024, last previous CT chest with stable chronic changes of HILLARY infection.     The history is provided by the patient. No  was used.     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 Dr. Pham    Hx of colonic polyps     followed by GI. Dr. Pham    Hyperlipidemia associated with type 2 diabetes mellitus     Hyperlipidemia LDL goal <100     Hypernatremia 05/05/2022    Hypertension associated with type 2 diabetes mellitus     Hypertension associated with type 2 diabetes mellitus     Hypotension     Intermittent confusion 04/29/2022    Overweight(278.02)      Prostate cancer 09/2011    followed by urology, Dr. Rogers    Pulmonary embolism     Reducible right inguinal hernia 10/10/2022    Right hemiparesis 07/22/2022    Secondary to cervical hematoma    Type II or unspecified type diabetes mellitus without mention of complication, not stated as uncontrolled     diet controlled    Urinary retention 04/29/2022     Past Surgical History:   Procedure Laterality Date    BRONCHOSCOPY N/A 10/15/2018    Procedure: BRONCHOSCOPY;  Surgeon: [facility] Provider;  Location: [facility] OR 2ND FLR;  Service: Anesthesiology;  Laterality: N/A;    CARPAL TUNNEL RELEASE Right 1/11/2023    Procedure: RELEASE, CARPAL TUNNEL;  Surgeon: Romero Lazo MD;  Location: [facility] OR;  Service: Orthopedics;  Laterality: Right;    CATARACT EXTRACTION, BILATERAL  2014    COLONOSCOPY N/A 5/16/2023    Procedure: COLONOSCOPY;  Surgeon: Buster Sequeira MD;  Location: [facility] ENDO (4TH FLR);  Service: Endoscopy;  Laterality: N/A;    DECOMPRESSION, NERVE, ULNAR Right 1/11/2023    Procedure: DECOMPRESSION, NERVE, ULNAR - right cub jolie and guyon's decompression as well as open right carpal tunnel release;  Surgeon: Romero Lazo MD;  Location: [facility] OR;  Service: Orthopedics;  Laterality: Right;    ESOPHAGOGASTRODUODENOSCOPY N/A 5/16/2023    Procedure: EGD (ESOPHAGOGASTRODUODENOSCOPY);  Surgeon: Buster Sequeira MD;  Location: [facility] (4TH FLR);  Service: Endoscopy;  Laterality: N/A;  inst mailed-RB  5/10/23 no answer for precall/mleone lpn    POSTERIOR CERVICAL LAMINECTOMY Bilateral 4/28/2022    Procedure: LAMINECTOMY, SPINE, CERVICAL, POSTERIOR APPROACH C3-6;  Surgeon: Carlos Miller MD;  Location: [facility] OR 2ND FLR;  Service: Neurosurgery;  Laterality: Bilateral;    PROSTATECTOMY      REPAIR, HERNIA, INGUINAL, WITHOUT HISTORY OF PRIOR REPAIR, AGE 5 YEARS OR OLDER Right 10/10/2022    Procedure: REPAIR, HERNIA, INGUINAL, WITHOUT HISTORY OF PRIOR REPAIR, AGE 5 YEARS OR OLDER;  Surgeon: Dario Esquivel MD;   Location: [facility] OR [address] [place] [address];  Service: General;  Laterality: Right;  open right inguinal hernia repair with mesh     Family History   Problem Relation Name Age of Onset    Colon cancer Mother      Diabetes Mother      Heart disease Father      Mental illness Sister Rashida     Diabetes Sister Rashida     Other Brother Mayito         polio as a child    Pulmonary fibrosis Brother Mayito     Diabetes Brother Benito     Myasthenia gravis Brother Benito     Parkinsonism Brother Fabio     Diabetes Brother Fabio     Dementia Brother Fabio     Lung cancer Brother Jennifer         smoker    Diabetes Maternal Aunt      Diabetes Other      Esophageal cancer Neg Hx       Social History[1]  Review of Systems   Constitutional:  Negative for fever.   HENT:  Positive for sore throat. Negative for congestion and rhinorrhea.    Respiratory:  Positive for cough (Productive). Negative for shortness of breath.    Cardiovascular:  Negative for leg swelling.   Skin:  Negative for rash.   Neurological:  Negative for numbness.   All other systems reviewed and are negative.      Physical Exam     Initial Vitals   BP Pulse Resp Temp SpO2   04/03/25 1443 04/03/25 1443 04/03/25 1443 04/03/25 1550 04/03/25 1443   (!) 114/51 61 20 98.7 °F (37.1 °C) 100 %      MAP       --                  Patient gave consent to have physical exam performed.    Physical Exam    Constitutional: Vital signs are normal. He appears well-developed and well-nourished.   HENT:   Head: Normocephalic and atraumatic.   Right Ear: External ear normal.   Left Ear: External ear normal.   Nose: Mucosal edema and rhinorrhea present.   Postnasal drip present. Posterior oropharyngeal erythema and mild edema without exudates.    Eyes: Conjunctivae are normal.   Neck: Neck supple.   Normal range of motion.  Cardiovascular:  Normal rate, regular rhythm and normal heart sounds.     Exam reveals no gallop and no friction rub.       No murmur heard.  Pulmonary/Chest: No respiratory  distress. He has no rhonchi. He has no rales.   Expiratory wheezing.    Musculoskeletal:      Cervical back: Normal range of motion and neck supple.     Neurological: He is alert and oriented to person, place, and time.   Skin: Skin is warm and dry. Capillary refill takes less than 2 seconds. No rash noted.   Psychiatric: He has a normal mood and affect.         ED Course   Procedures  Labs Reviewed   SARS-COV-2 RDRP GENE       Result Value    POC Rapid COVID Negative       Acceptable Yes      Narrative:     This test utilizes isothermal nucleic acid amplification technology to detect the SARS-CoV-2 RdRp nucleic acid segment. The analytical sensitivity (limit of detection) is 500 copies/swab.     A POSITIVE result is indicative of the presence of SARS-CoV-2 RNA; clinical correlation with patient history and other diagnostic information is necessary to determine patient infection status.    A NEGATIVE result means that SARS-CoV-2 nucleic acids are not present above the limit of detection. A NEGATIVE result should be treated as presumptive. It does not rule out the possibility of COVID-19 and should not be the sole basis for treatment decisions. If COVID-19 is strongly suspected based on clinical and exposure history, re-testing using an alternate molecular assay should be considered.     Commercial kits are provided by [employer].       POCT GLUCOSE, HAND-HELD DEVICE   POCT STREP A MOLECULAR   POCT INFLUENZA A/B MOLECULAR   POCT GLUCOSE    POCT Glucose 99     POCT STREP A, RAPID    POC Rapid Strep A negative     POCT RAPID INFLUENZA A/B    Influenza B Ag negative      Inflenza A Ag negative            Imaging Results              X-Ray Chest PA And Lateral (Final result)  Result time 04/03/25 16:02:17      Final result by Dalton Gallo MD (04/03/25 16:02:17)                   Impression:      No acute cardiopulmonary process.      Electronically signed by: Dalton Gallo  MD  Date:    04/03/2025  Time:    16:02               Narrative:    EXAMINATION:  XR CHEST PA AND LATERAL    CLINICAL HISTORY:  Cough, unspecified    TECHNIQUE:  PA and lateral views of the chest were performed.    COMPARISON:  Prior radiographs    FINDINGS:  Cardiac silhouette and mediastinal contours are stable.  Lungs demonstrate acute opacity with right mid to upper lung scarring.  No pleural effusion.  Osseous structures demonstrate no acute abnormality.                                       Medications   acetaminophen tablet 650 mg (650 mg Oral Given 4/3/25 1647)   benzonatate capsule 200 mg (200 mg Oral Given 4/3/25 1647)   albuterol-ipratropium 2.5 mg-0.5 mg/3 mL nebulizer solution 3 mL (3 mLs Nebulization Given 4/3/25 1632)     Medical Decision Making  Amount and/or Complexity of Data Reviewed  External Data Reviewed: notes.     Details: See HPI.  Labs: ordered. Decision-making details documented in ED Course.  Radiology: ordered. Decision-making details documented in ED Course.    Risk  OTC drugs.  Prescription drug management.    Medical Decision Making:    This is an evaluation of a 84 y.o. male that presents to the Emergency Department for   Chief Complaint   Patient presents with    Cough     Pt reports increased cough today with sore throat     The patient is a non-toxic and well appearing patient. On physical exam, patient appears well hydrated with moist mucus membranes. No tachypnea or respiratory distress. Regular rate and rhythm. No murmurs. Abdomen soft and non tender. Patient is tolerating PO without difficulty. Physical exam otherwise as above.     I have reviewed vital signs and nursing notes.   Vital Signs Are Reassuring.     Based on the patient's symptoms, I am considering and evaluating for the following differential diagnoses: Flu, strep, COVID, Pneumonia, viral illness, pneumothorax, COPD exacerbation.    ED Course:Treatment in the ED included Physical Exam and medications given in  ED  Medications   acetaminophen tablet 650 mg (650 mg Oral Given 4/3/25 1647)   benzonatate capsule 200 mg (200 mg Oral Given 4/3/25 1647)   albuterol-ipratropium 2.5 mg-0.5 mg/3 mL nebulizer solution 3 mL (3 mLs Nebulization Given 4/3/25 1632)   .   Patient reports feeling better after treatment in the ER.       External Data/Documents Reviewed: Previous medical records and vital signs reviewed, see HPI and Physical exam.   Labs: ordered and reviewed.  COVID negative, flu A negative, and flu B negative..  Radiology: ordered as indicated and reviewed.  No pneumothorax.    Risk  Diagnosis or treatment significantly limited by the following social determinants of health: Body mass index is 23.87 kg/m².     In shared decision making with the patient, we discussed treatment, prescriptions, labs, and imaging results.    Discharge home with   ED Prescriptions       Medication Sig Dispense Start Date End Date Auth. Provider    acetaminophen (TYLENOL) 500 MG tablet Take 1 tablet (500 mg total) by mouth every 6 (six) hours as needed. 30 tablet 4/3/2025 -- Ronda Marc, DO    sodium chloride (SALINE NASAL) 0.65 % nasal spray 1 spray by Nasal route as needed for Congestion. 30 mL 4/3/2025 -- Ronda Marc, DO    levocetirizine (XYZAL) 5 MG tablet Take 1 tablet (5 mg total) by mouth every evening. 30 tablet 4/3/2025 4/3/2026 Ronda Marc, DO    fluticasone propionate (FLONASE) 50 mcg/actuation nasal spray 1 spray (50 mcg total) by Each Nostril route 2 (two) times daily. 16 g 4/3/2025 -- Ronda Marc,     benzonatate (TESSALON) 200 MG capsule Take 1 capsule (200 mg total) by mouth 3 (three) times daily as needed for Cough. 30 capsule 4/3/2025 4/13/2025 Ronda Marc, DO          Fill and take prescriptions as directed.  Return to the ED if symptoms worsen or do not resolve.   Answered questions and discussed discharge plan.    Patient reports resolution of symptoms and is ready for discharge.  Follow up with PCP/specialist in 1  day    At time of discharge patient is awake alert oriented x4 speaking clearly in full sentences and moving all 4 extremities.     The following labs and imaging were reviewed:    Admission on 04/03/2025   Component Date Value Ref Range Status    POC Rapid COVID 04/03/2025 Negative  Negative Final     Acceptable 04/03/2025 Yes   Final    POCT Glucose 04/03/2025 99  70 - 110 mg/dL Final    POC Rapid Strep A 04/03/2025 negative  Positive/Negative Final    Influenza B Ag 04/03/2025 negative  Positive/Negative Final    Inflenza A Ag 04/03/2025 negative  Positive/Negative Final        Imaging Results              X-Ray Chest PA And Lateral (Final result)  Result time 04/03/25 16:02:17      Final result by Dalton Gallo MD (04/03/25 16:02:17)                   Impression:      No acute cardiopulmonary process.      Electronically signed by: Dalton Gallo MD  Date:    04/03/2025  Time:    16:02               Narrative:    EXAMINATION:  XR CHEST PA AND LATERAL    CLINICAL HISTORY:  Cough, unspecified    TECHNIQUE:  PA and lateral views of the chest were performed.    COMPARISON:  Prior radiographs    FINDINGS:  Cardiac silhouette and mediastinal contours are stable.  Lungs demonstrate acute opacity with right mid to upper lung scarring.  No pleural effusion.  Osseous structures demonstrate no acute abnormality.                                          Scribe Attestation:   Scribe #1: I performed the above scribed service and the documentation accurately describes the services I performed. I attest to the accuracy of the note.                            I, Dr. Ronda Marc, personally performed the services described in this documentation. This document was produced by a scribe under my direction and in my presence. All medical record entries made by the scribe were at my direction and in my presence.  I have reviewed the chart and agree that the record reflects my personal performance and is  accurate and complete. Ronda Marc DO.     2025 5:30 PM      Clinical Impression:  Final diagnoses:  [R05.9] Cough  [J06.9] Viral URI with cough (Primary)          ED Disposition Condition    Discharge Stable          ED Prescriptions       Medication Sig Dispense Start Date End Date Auth. Provider    acetaminophen (TYLENOL) 500 MG tablet Take 1 tablet (500 mg total) by mouth every 6 (six) hours as needed. 30 tablet 4/3/2025 -- Ronda Marc DO    sodium chloride (SALINE NASAL) 0.65 % nasal spray 1 spray by Nasal route as needed for Congestion. 30 mL 4/3/2025 -- Ronda Marc DO    levocetirizine (XYZAL) 5 MG tablet Take 1 tablet (5 mg total) by mouth every evening. 30 tablet 4/3/2025 4/3/2026 Ronda Marc DO    fluticasone propionate (FLONASE) 50 mcg/actuation nasal spray 1 spray (50 mcg total) by Each Nostril route 2 (two) times daily. 16 g 4/3/2025 -- Ronda Marc DO    benzonatate (TESSALON) 200 MG capsule Take 1 capsule (200 mg total) by mouth 3 (three) times daily as needed for Cough. 30 capsule 4/3/2025 2025 Ronda Marc DO          Follow-up Information       Follow up With Specialties Details Why Contact Info    Portia Ferreira MD Internal Medicine, Pediatrics Schedule an appointment as soon as possible for a visit in 1 day  [address] [facility]  [place] [address]  [phone]      [facility] - Emergency Dept Emergency Medicine  Go to [facility] emergency department on [facility] if symptoms worsen or do not resolve [address] [facility] [address] [phone]               [1]   Social History  Tobacco Use    Smoking status: Former     Current packs/day: 0.00     Average packs/day: 0.3 packs/day for 5.0 years (1.3 ttl pk-yrs)     Types: Cigarettes     Start date: 10/2/1957     Quit date: 10/2/1962     Years since quittin.5    Smokeless tobacco: Former    Tobacco comments:     quit age 23   Substance Use Topics    Alcohol use: Not Currently     Drug use: No        Ronda Marc DO  04/03/25 0561

## 2025-04-03 NOTE — ED TRIAGE NOTES
Diego Gunter, a 84 y.o. male presents to the ED w/ complaint of a cough that has increased today accompanied by a sore throat.  He denies fever or any other symptoms.     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 Dr. Pham    Hx of colonic polyps     followed by GI. Dr. Pham    Hyperlipidemia associated with type 2 diabetes mellitus     Hyperlipidemia LDL goal <100     Hypernatremia 05/05/2022    Hypertension associated with type 2 diabetes mellitus     Hypertension associated with type 2 diabetes mellitus     Hypotension     Intermittent confusion 04/29/2022    Overweight(278.02)     Prostate cancer 09/2011    followed by urology, Dr. Rogers    Pulmonary embolism     Reducible right inguinal hernia 10/10/2022    Right hemiparesis 07/22/2022    Secondary to cervical hematoma    Type II or unspecified type diabetes mellitus without mention of complication, not stated as uncontrolled     diet controlled    Urinary retention 04/29/2022

## 2025-04-04 ENCOUNTER — PATIENT OUTREACH (OUTPATIENT)
Facility: OTHER | Age: 85
End: 2025-04-04
Payer: MEDICARE

## 2025-04-04 RX ORDER — PANTOPRAZOLE SODIUM 40 MG/1
40 TABLET, DELAYED RELEASE ORAL
Qty: 90 TABLET | Refills: 0 | Status: SHIPPED | OUTPATIENT
Start: 2025-04-04

## 2025-04-04 NOTE — PROGRESS NOTES
Patient was seen in the ED on 4/3/25. Phoned patient to assist with Post ED Discharge Navigation. Patient agreed to assistance scheduling a PCP follow-up. In Basket message sent to PCP staff for scheduling assistance.  Yudith Schmid

## 2025-04-11 ENCOUNTER — TELEPHONE (OUTPATIENT)
Dept: INFECTIOUS DISEASES | Facility: CLINIC | Age: 85
End: 2025-04-11
Payer: MEDICARE

## 2025-04-11 NOTE — TELEPHONE ENCOUNTER
----- Message from Paola Rao MD sent at 4/11/2025  1:52 PM CDT -----  Regarding: RE: Appt R/s  Can do virtual next week or in July. Audio fine. 1 or 130 Monday preferrable  ----- Message -----  From: Kristi Morse MA  Sent: 4/10/2025   3:28 PM CDT  To: Paola Rao MD  Subject: FW: Appt R/s                                       ----- Message -----  From: Deya Mcdonald  Sent: 4/10/2025   3:03 PM CDT  To: Maira Guevara Staff  Subject: Appt R/s                                         Scheduling Request   Appt Type:Ep  Date/Time Preference: Treating Provider:Paola Rao MD Caller Name:Addis  Contact Preference: [phone] Comments/notes:Patient called to schedule a follow up appt per recall letter.

## 2025-04-14 ENCOUNTER — TELEPHONE (OUTPATIENT)
Dept: INFECTIOUS DISEASES | Facility: CLINIC | Age: 85
End: 2025-04-14
Payer: MEDICARE

## 2025-04-14 NOTE — TELEPHONE ENCOUNTER
----- Message from Kwame Houser sent at 4/14/2025  1:01 PM CDT -----    ----- Message -----  From: Melvi Curtis  Sent: 4/14/2025  12:35 PM CDT  To: Maira Guevara Staff    Type:  Patient Returning CallWho Called:Ms Gunter Who Left Message for Patient:Kristi Does the patient know what this is regarding?:yes Would the patient rather a call back or a response via [facility]? Call Best Call Back Number:[phone] Information: please call back

## 2025-04-15 ENCOUNTER — TELEPHONE (OUTPATIENT)
Dept: INFECTIOUS DISEASES | Facility: CLINIC | Age: 85
End: 2025-04-15
Payer: MEDICARE

## 2025-04-15 ENCOUNTER — APPOINTMENT (OUTPATIENT)
Dept: LAB | Facility: HOSPITAL | Age: 85
End: 2025-04-15
Attending: INTERNAL MEDICINE
Payer: MEDICARE

## 2025-04-15 DIAGNOSIS — A31.0 MYCOBACTERIUM AVIUM-INTRACELLULARE COMPLEX: Primary | ICD-10-CM

## 2025-04-15 PROCEDURE — 87206 SMEAR FLUORESCENT/ACID STAI: CPT

## 2025-04-15 PROCEDURE — 87116 MYCOBACTERIA CULTURE: CPT

## 2025-04-15 NOTE — TELEPHONE ENCOUNTER
Called and scheduled f/u appt    ----- Message from Monet sent at 4/15/2025  9:20 AM CDT -----  Regarding: f/u appt  Contact: Pt @ [phone]  Pt called in to schedule an appt; no available appts in Epic. Pt is asking for a call back soon to schedule. Thanks.

## 2025-04-16 LAB — ACID FAST MOD KINY STN SPEC: NORMAL

## 2025-05-06 NOTE — ASSESSMENT & PLAN NOTE
Hold AC/AP for now  Follow CBC and coags closely  Hematology consult, appreciate reccs   Quality 110: Preventive Care And Screening: Influenza Immunization: Influenza Immunization Ordered or Recommended, but not Administered due to system reason PAST SURGICAL HISTORY:  No significant past surgical history      Quality 431: Preventive Care And Screening: Unhealthy Alcohol Use - Screening: Patient screened for unhealthy alcohol use using a single question and scores less than 2 times per year Quality 111:Pneumonia Vaccination Status For Older Adults: Pneumococcal Vaccination not Administered or Previously Received, Reason not Otherwise Specified Detail Level: Detailed Quality 226: Preventive Care And Screening: Tobacco Use: Screening And Cessation Intervention: Patient screened for tobacco and never smoked Quality 130: Documentation Of Current Medications In The Medical Record: Current Medications Documented

## 2025-05-13 ENCOUNTER — TELEPHONE (OUTPATIENT)
Dept: FAMILY MEDICINE | Facility: CLINIC | Age: 85
End: 2025-05-13
Payer: MEDICARE

## 2025-05-13 DIAGNOSIS — E78.49 OTHER HYPERLIPIDEMIA: ICD-10-CM

## 2025-05-13 DIAGNOSIS — R73.03 PREDIABETES: ICD-10-CM

## 2025-05-15 ENCOUNTER — PATIENT OUTREACH (OUTPATIENT)
Dept: ADMINISTRATIVE | Facility: HOSPITAL | Age: 85
End: 2025-05-15
Payer: MEDICARE

## 2025-05-15 DIAGNOSIS — R73.03 PREDIABETES: Primary | ICD-10-CM

## 2025-05-30 ENCOUNTER — RESULTS FOLLOW-UP (OUTPATIENT)
Dept: FAMILY MEDICINE | Facility: CLINIC | Age: 85
End: 2025-05-30

## 2025-05-30 ENCOUNTER — LAB VISIT (OUTPATIENT)
Dept: LAB | Facility: HOSPITAL | Age: 85
End: 2025-05-30
Attending: INTERNAL MEDICINE
Payer: MEDICARE

## 2025-05-30 DIAGNOSIS — R73.03 PREDIABETES: ICD-10-CM

## 2025-05-30 DIAGNOSIS — E78.49 OTHER HYPERLIPIDEMIA: ICD-10-CM

## 2025-05-30 LAB
ABSOLUTE EOSINOPHIL (OHS): 0.29 K/UL
ABSOLUTE MONOCYTE (OHS): 0.85 K/UL (ref 0.3–1)
ABSOLUTE NEUTROPHIL COUNT (OHS): 2.74 K/UL (ref 1.8–7.7)
ALBUMIN SERPL BCP-MCNC: 4.2 G/DL (ref 3.5–5.2)
ALP SERPL-CCNC: 62 UNIT/L (ref 40–150)
ALT SERPL W/O P-5'-P-CCNC: 26 UNIT/L (ref 10–44)
ANION GAP (OHS): 10 MMOL/L (ref 8–16)
AST SERPL-CCNC: 23 UNIT/L (ref 11–45)
BASOPHILS # BLD AUTO: 0.07 K/UL
BASOPHILS NFR BLD AUTO: 1 %
BILIRUB SERPL-MCNC: 0.9 MG/DL (ref 0.1–1)
BUN SERPL-MCNC: 16 MG/DL (ref 8–23)
CALCIUM SERPL-MCNC: 9.4 MG/DL (ref 8.7–10.5)
CHLORIDE SERPL-SCNC: 108 MMOL/L (ref 95–110)
CHOLEST SERPL-MCNC: 125 MG/DL (ref 120–199)
CHOLEST/HDLC SERPL: 2.8 {RATIO} (ref 2–5)
CO2 SERPL-SCNC: 26 MMOL/L (ref 23–29)
CREAT SERPL-MCNC: 1.3 MG/DL (ref 0.5–1.4)
EAG (OHS): 94 MG/DL (ref 68–131)
ERYTHROCYTE [DISTWIDTH] IN BLOOD BY AUTOMATED COUNT: 15.9 % (ref 11.5–14.5)
GFR SERPLBLD CREATININE-BSD FMLA CKD-EPI: 55 ML/MIN/1.73/M2
GLUCOSE SERPL-MCNC: 90 MG/DL (ref 70–110)
HBA1C MFR BLD: 4.9 % (ref 4–5.6)
HCT VFR BLD AUTO: 43.9 % (ref 40–54)
HDLC SERPL-MCNC: 45 MG/DL (ref 40–75)
HDLC SERPL: 36 % (ref 20–50)
HGB BLD-MCNC: 14 GM/DL (ref 14–18)
IMM GRANULOCYTES # BLD AUTO: 0.02 K/UL (ref 0–0.04)
IMM GRANULOCYTES NFR BLD AUTO: 0.3 % (ref 0–0.5)
LDLC SERPL CALC-MCNC: 55.6 MG/DL (ref 63–159)
LYMPHOCYTES # BLD AUTO: 2.9 K/UL (ref 1–4.8)
MCH RBC QN AUTO: 29.2 PG (ref 27–31)
MCHC RBC AUTO-ENTMCNC: 31.9 G/DL (ref 32–36)
MCV RBC AUTO: 92 FL (ref 82–98)
NONHDLC SERPL-MCNC: 80 MG/DL
NUCLEATED RBC (/100WBC) (OHS): 0 /100 WBC
PLATELET # BLD AUTO: 213 K/UL (ref 150–450)
PMV BLD AUTO: 10.6 FL (ref 9.2–12.9)
POTASSIUM SERPL-SCNC: 4.1 MMOL/L (ref 3.5–5.1)
PROT SERPL-MCNC: 7.1 GM/DL (ref 6–8.4)
RBC # BLD AUTO: 4.8 M/UL (ref 4.6–6.2)
RELATIVE EOSINOPHIL (OHS): 4.2 %
RELATIVE LYMPHOCYTE (OHS): 42.2 % (ref 18–48)
RELATIVE MONOCYTE (OHS): 12.4 % (ref 4–15)
RELATIVE NEUTROPHIL (OHS): 39.9 % (ref 38–73)
SODIUM SERPL-SCNC: 144 MMOL/L (ref 136–145)
TRIGL SERPL-MCNC: 122 MG/DL (ref 30–150)
WBC # BLD AUTO: 6.87 K/UL (ref 3.9–12.7)

## 2025-05-30 PROCEDURE — 82465 ASSAY BLD/SERUM CHOLESTEROL: CPT

## 2025-05-30 PROCEDURE — 83036 HEMOGLOBIN GLYCOSYLATED A1C: CPT

## 2025-05-30 PROCEDURE — 85025 COMPLETE CBC W/AUTO DIFF WBC: CPT

## 2025-05-30 PROCEDURE — 82374 ASSAY BLOOD CARBON DIOXIDE: CPT

## 2025-05-30 PROCEDURE — 36415 COLL VENOUS BLD VENIPUNCTURE: CPT | Mod: PO

## 2025-06-02 ENCOUNTER — OFFICE VISIT (OUTPATIENT)
Dept: FAMILY MEDICINE | Facility: CLINIC | Age: 85
End: 2025-06-02
Payer: MEDICARE

## 2025-06-02 VITALS
DIASTOLIC BLOOD PRESSURE: 66 MMHG | BODY MASS INDEX: 23.44 KG/M2 | HEIGHT: 68 IN | WEIGHT: 154.63 LBS | SYSTOLIC BLOOD PRESSURE: 90 MMHG | TEMPERATURE: 98 F | HEART RATE: 58 BPM

## 2025-06-02 DIAGNOSIS — E78.49 OTHER HYPERLIPIDEMIA: Primary | ICD-10-CM

## 2025-06-02 DIAGNOSIS — J98.4 CAVITARY LUNG DISEASE: ICD-10-CM

## 2025-06-02 DIAGNOSIS — R00.1 BRADYCARDIA: ICD-10-CM

## 2025-06-02 DIAGNOSIS — J43.2 CENTRILOBULAR EMPHYSEMA: ICD-10-CM

## 2025-06-02 DIAGNOSIS — I70.0 ATHEROSCLEROSIS OF AORTA: ICD-10-CM

## 2025-06-02 DIAGNOSIS — F03.92 NEURODEGENERATIVE DEMENTIA WITH PSYCHOTIC DISTURBANCE: ICD-10-CM

## 2025-06-02 DIAGNOSIS — D68.61 ANTIPHOSPHOLIPID SYNDROME: ICD-10-CM

## 2025-06-02 DIAGNOSIS — N18.31 CHRONIC KIDNEY DISEASE, STAGE 3A: ICD-10-CM

## 2025-06-02 DIAGNOSIS — R73.03 PREDIABETES: ICD-10-CM

## 2025-06-02 DIAGNOSIS — I27.20 MILD PULMONARY HYPERTENSION: ICD-10-CM

## 2025-06-02 PROCEDURE — 1159F MED LIST DOCD IN RCRD: CPT | Mod: CPTII,S$GLB,, | Performed by: INTERNAL MEDICINE

## 2025-06-02 PROCEDURE — 1160F RVW MEDS BY RX/DR IN RCRD: CPT | Mod: CPTII,S$GLB,, | Performed by: INTERNAL MEDICINE

## 2025-06-02 PROCEDURE — 1157F ADVNC CARE PLAN IN RCRD: CPT | Mod: CPTII,S$GLB,, | Performed by: INTERNAL MEDICINE

## 2025-06-02 PROCEDURE — 99214 OFFICE O/P EST MOD 30 MIN: CPT | Mod: S$GLB,,, | Performed by: INTERNAL MEDICINE

## 2025-06-02 PROCEDURE — G2211 COMPLEX E/M VISIT ADD ON: HCPCS | Mod: S$GLB,,, | Performed by: INTERNAL MEDICINE

## 2025-06-02 PROCEDURE — 3078F DIAST BP <80 MM HG: CPT | Mod: CPTII,S$GLB,, | Performed by: INTERNAL MEDICINE

## 2025-06-02 PROCEDURE — 3288F FALL RISK ASSESSMENT DOCD: CPT | Mod: CPTII,S$GLB,, | Performed by: INTERNAL MEDICINE

## 2025-06-02 PROCEDURE — 1126F AMNT PAIN NOTED NONE PRSNT: CPT | Mod: CPTII,S$GLB,, | Performed by: INTERNAL MEDICINE

## 2025-06-02 PROCEDURE — 3074F SYST BP LT 130 MM HG: CPT | Mod: CPTII,S$GLB,, | Performed by: INTERNAL MEDICINE

## 2025-06-02 PROCEDURE — 99999 PR PBB SHADOW E&M-EST. PATIENT-LVL IV: CPT | Mod: PBBFAC,,, | Performed by: INTERNAL MEDICINE

## 2025-06-02 PROCEDURE — 1101F PT FALLS ASSESS-DOCD LE1/YR: CPT | Mod: CPTII,S$GLB,, | Performed by: INTERNAL MEDICINE

## 2025-06-02 RX ORDER — DONEPEZIL HYDROCHLORIDE 10 MG/1
TABLET, FILM COATED ORAL
Qty: 30 TABLET | Refills: 4 | Status: CANCELLED | OUTPATIENT
Start: 2025-06-02

## 2025-06-03 ENCOUNTER — TELEPHONE (OUTPATIENT)
Dept: INFECTIOUS DISEASES | Facility: CLINIC | Age: 85
End: 2025-06-03
Payer: MEDICARE

## 2025-06-03 ENCOUNTER — RESULTS FOLLOW-UP (OUTPATIENT)
Dept: FAMILY MEDICINE | Facility: CLINIC | Age: 85
End: 2025-06-03

## 2025-06-03 ENCOUNTER — LAB VISIT (OUTPATIENT)
Dept: LAB | Facility: HOSPITAL | Age: 85
End: 2025-06-03
Attending: INTERNAL MEDICINE
Payer: MEDICARE

## 2025-06-03 DIAGNOSIS — A31.0 MYCOBACTERIUM AVIUM-INTRACELLULARE COMPLEX: ICD-10-CM

## 2025-06-03 DIAGNOSIS — A31.0 MYCOBACTERIUM AVIUM-INTRACELLULARE COMPLEX: Primary | ICD-10-CM

## 2025-06-03 DIAGNOSIS — R73.03 PREDIABETES: ICD-10-CM

## 2025-06-03 LAB
ALBUMIN/CREAT UR: NORMAL
CREAT UR-MCNC: 39 MG/DL (ref 23–375)
MICROALBUMIN UR-MCNC: <5 UG/ML (ref ?–5000)

## 2025-06-03 PROCEDURE — 87206 SMEAR FLUORESCENT/ACID STAI: CPT

## 2025-06-03 PROCEDURE — 87116 MYCOBACTERIA CULTURE: CPT

## 2025-06-03 PROCEDURE — 82570 ASSAY OF URINE CREATININE: CPT

## 2025-06-04 ENCOUNTER — TELEPHONE (OUTPATIENT)
Dept: NEUROLOGY | Facility: CLINIC | Age: 85
End: 2025-06-04
Payer: MEDICARE

## 2025-06-04 LAB — ACID FAST MOD KINY STN SPEC: NORMAL

## 2025-06-05 RX ORDER — ETHAMBUTOL HYDROCHLORIDE 400 MG/1
800 TABLET, FILM COATED ORAL DAILY
Qty: 60 TABLET | Refills: 5 | Status: SHIPPED | OUTPATIENT
Start: 2025-06-05 | End: 2026-06-05

## 2025-06-10 LAB — MYCOBACTERIUM SPEC QL CULT: NORMAL

## 2025-06-10 RX ORDER — DONEPEZIL HYDROCHLORIDE 10 MG/1
TABLET, FILM COATED ORAL
Qty: 30 TABLET | Refills: 1 | Status: SHIPPED | OUTPATIENT
Start: 2025-06-10

## 2025-06-11 ENCOUNTER — RESULTS FOLLOW-UP (OUTPATIENT)
Dept: INFECTIOUS DISEASES | Facility: HOSPITAL | Age: 85
End: 2025-06-11

## 2025-06-19 ENCOUNTER — PATIENT MESSAGE (OUTPATIENT)
Dept: INFECTIOUS DISEASES | Facility: CLINIC | Age: 85
End: 2025-06-19
Payer: MEDICARE

## 2025-06-19 ENCOUNTER — RESULTS FOLLOW-UP (OUTPATIENT)
Dept: INFECTIOUS DISEASES | Facility: CLINIC | Age: 85
End: 2025-06-19

## 2025-06-23 ENCOUNTER — LAB VISIT (OUTPATIENT)
Dept: LAB | Facility: HOSPITAL | Age: 85
End: 2025-06-23
Attending: INTERNAL MEDICINE
Payer: MEDICARE

## 2025-06-23 ENCOUNTER — PATIENT MESSAGE (OUTPATIENT)
Facility: CLINIC | Age: 85
End: 2025-06-23
Payer: MEDICARE

## 2025-06-23 DIAGNOSIS — A31.0 MYCOBACTERIUM AVIUM-INTRACELLULARE COMPLEX: Primary | ICD-10-CM

## 2025-06-23 DIAGNOSIS — A31.0 MYCOBACTERIUM AVIUM-INTRACELLULARE COMPLEX: ICD-10-CM

## 2025-06-23 PROCEDURE — 87015 SPECIMEN INFECT AGNT CONCNTJ: CPT

## 2025-06-23 PROCEDURE — 87206 SMEAR FLUORESCENT/ACID STAI: CPT

## 2025-06-24 LAB — ACID FAST MOD KINY STN SPEC: NORMAL

## 2025-06-25 ENCOUNTER — PATIENT MESSAGE (OUTPATIENT)
Dept: ADMINISTRATIVE | Facility: OTHER | Age: 85
End: 2025-06-25
Payer: MEDICARE

## 2025-06-26 DIAGNOSIS — E78.5 HYPERLIPIDEMIA LDL GOAL <100: ICD-10-CM

## 2025-06-26 RX ORDER — PRAVASTATIN SODIUM 10 MG/1
10 TABLET ORAL NIGHTLY
Qty: 90 TABLET | Refills: 3 | Status: SHIPPED | OUTPATIENT
Start: 2025-06-26

## 2025-06-26 NOTE — TELEPHONE ENCOUNTER
Refill Decision Note   Diego Gunter  is requesting a refill authorization.  Brief Assessment and Rationale for Refill:  Approve     Medication Therapy Plan:        Comments:     Note composed:8:12 AM 06/26/2025

## 2025-06-26 NOTE — TELEPHONE ENCOUNTER
No care due was identified.  [facility] Embedded Care Due Messages. Reference number: [id number].   6/26/2025 7:52:38 AM CDT

## 2025-06-27 ENCOUNTER — OFFICE VISIT (OUTPATIENT)
Dept: HOME HEALTH SERVICES | Facility: CLINIC | Age: 85
End: 2025-06-27
Payer: MEDICARE

## 2025-06-27 VITALS
HEIGHT: 68 IN | HEART RATE: 59 BPM | DIASTOLIC BLOOD PRESSURE: 57 MMHG | SYSTOLIC BLOOD PRESSURE: 90 MMHG | WEIGHT: 154 LBS | BODY MASS INDEX: 23.34 KG/M2

## 2025-06-27 DIAGNOSIS — I70.0 ATHEROSCLEROSIS OF AORTA: ICD-10-CM

## 2025-06-27 DIAGNOSIS — I95.9 HYPOTENSION, UNSPECIFIED HYPOTENSION TYPE: ICD-10-CM

## 2025-06-27 DIAGNOSIS — F41.9 ANXIETY: ICD-10-CM

## 2025-06-27 DIAGNOSIS — J43.2 CENTRILOBULAR EMPHYSEMA: ICD-10-CM

## 2025-06-27 DIAGNOSIS — A31.0 MAI (MYCOBACTERIUM AVIUM-INTRACELLULARE): ICD-10-CM

## 2025-06-27 DIAGNOSIS — J98.4 CAVITARY LUNG DISEASE: ICD-10-CM

## 2025-06-27 DIAGNOSIS — Z00.00 ENCOUNTER FOR MEDICARE ANNUAL WELLNESS EXAM: Primary | ICD-10-CM

## 2025-06-27 DIAGNOSIS — F03.92 NEURODEGENERATIVE DEMENTIA WITH PSYCHOTIC DISTURBANCE: ICD-10-CM

## 2025-06-27 DIAGNOSIS — N18.31 CHRONIC KIDNEY DISEASE, STAGE 3A: ICD-10-CM

## 2025-06-27 PROCEDURE — G0439 PPPS, SUBSEQ VISIT: HCPCS | Mod: S$GLB,,, | Performed by: NURSE PRACTITIONER

## 2025-06-27 PROCEDURE — 1126F AMNT PAIN NOTED NONE PRSNT: CPT | Mod: CPTII,S$GLB,, | Performed by: NURSE PRACTITIONER

## 2025-06-27 PROCEDURE — 1123F ACP DISCUSS/DSCN MKR DOCD: CPT | Mod: CPTII,S$GLB,, | Performed by: NURSE PRACTITIONER

## 2025-06-27 PROCEDURE — 3288F FALL RISK ASSESSMENT DOCD: CPT | Mod: CPTII,S$GLB,, | Performed by: NURSE PRACTITIONER

## 2025-06-27 PROCEDURE — 1159F MED LIST DOCD IN RCRD: CPT | Mod: CPTII,S$GLB,, | Performed by: NURSE PRACTITIONER

## 2025-06-27 PROCEDURE — 3074F SYST BP LT 130 MM HG: CPT | Mod: CPTII,S$GLB,, | Performed by: NURSE PRACTITIONER

## 2025-06-27 PROCEDURE — 3078F DIAST BP <80 MM HG: CPT | Mod: CPTII,S$GLB,, | Performed by: NURSE PRACTITIONER

## 2025-06-27 PROCEDURE — 1101F PT FALLS ASSESS-DOCD LE1/YR: CPT | Mod: CPTII,S$GLB,, | Performed by: NURSE PRACTITIONER

## 2025-06-27 PROCEDURE — 1160F RVW MEDS BY RX/DR IN RCRD: CPT | Mod: CPTII,S$GLB,, | Performed by: NURSE PRACTITIONER

## 2025-06-29 DIAGNOSIS — F41.9 ANXIETY: ICD-10-CM

## 2025-06-29 DIAGNOSIS — R13.12 OROPHARYNGEAL DYSPHAGIA: ICD-10-CM

## 2025-06-30 ENCOUNTER — PATIENT MESSAGE (OUTPATIENT)
Dept: RESEARCH | Facility: CLINIC | Age: 85
End: 2025-06-30
Payer: MEDICARE

## 2025-06-30 RX ORDER — ESCITALOPRAM OXALATE 10 MG/1
TABLET ORAL
Qty: 135 TABLET | Refills: 1 | Status: SHIPPED | OUTPATIENT
Start: 2025-06-30

## 2025-06-30 RX ORDER — PANTOPRAZOLE SODIUM 40 MG/1
40 TABLET, DELAYED RELEASE ORAL DAILY
Qty: 90 TABLET | Refills: 1 | Status: SHIPPED | OUTPATIENT
Start: 2025-06-30

## 2025-06-30 RX ORDER — ESCITALOPRAM OXALATE 10 MG/1
15 TABLET ORAL DAILY
Qty: 135 TABLET | Refills: 1 | OUTPATIENT
Start: 2025-06-30

## 2025-07-01 NOTE — PATIENT INSTRUCTIONS
Counseling and Referral of Other Preventative  (Italic type indicates deductible and co-insurance are waived)    Patient Name: Diego Gunter  Today's Date: 7/1/2025    Health Maintenance       Date Due Completion Date    RSV Vaccine (Age 60+ and Pregnant patients) (1 - 1-dose 75+ series) Never done ---    COVID-19 Vaccine (4 - 2024-25 season) 09/01/2024 11/5/2021    TETANUS VACCINE 03/08/2025 3/8/2015    Influenza Vaccine (1) 09/01/2025 10/3/2023    Override on 10/19/2020: Done    Hemoglobin A1c (Prediabetes) 05/30/2026 5/30/2025    Lipid Panel 05/30/2026 5/30/2025        No orders of the defined types were placed in this encounter.      The following information is provided to all patients.  This information is to help you find resources for any of the problems found today that may be affecting your health:                  Living healthy guide: www.[facility].[place].[facility]      Understanding Diabetes: www.diabetes.org      Eating healthy: www.cdc.gov/healthyweight      [facility] home safety checklist: www.cdc.gov/steadi/patient.html      Agency on Aging: www.goea.[place].[facility]      Alcoholics anonymous (AA): www.aa.org      Physical Activity: www.wing.nih.gov/sm9xxyk      Tobacco use: www.quitwithusla.org          Patient Quality Outreach    Patient is due for the following:   Diabetes -  A1C, Microalbumin, and Foot Exam  Depression  -  PHQ-9 needed  Physical Annual Wellness Visit    Action(s) Taken:   Schedule a Annual Wellness Visit    Type of outreach:    Sent GetYou message.    Questions for provider review:    None           Ileana Reyes, [facility] [address]

## 2025-07-01 NOTE — PROGRESS NOTES
"  Diego Gunter presented for a follow-up Medicare AWV today. The following components were reviewed and updated:    Medical history  Family History  Social history  Allergies and Current Medications  Health Risk Assessment  Health Maintenance  Care Team    **See Completed Assessments for Annual Wellness visit with in the encounter summary    The following assessments were completed:  Depression Screening  Cognitive function Screening  Timed Get Up Test  Whisper Test      Opioid documentation:      Patient does not have a current opioid prescription.          Vitals:    06/27/25 1022   BP: (!) 90/57   Patient Position: Sitting   Pulse: (!) 59   Weight: 69.9 kg (154 lb)   Height: 5' 8" (1.727 m)     Body mass index is 23.42 kg/m².       Physical Exam  Constitutional:       Appearance: Normal appearance.   HENT:      Head: Normocephalic and atraumatic.      Nose: Nose normal.      Mouth/Throat:      Mouth: Mucous membranes are moist.   Eyes:      Extraocular Movements: Extraocular movements intact.   Cardiovascular:      Rate and Rhythm: Regular rhythm. Bradycardia present.      Heart sounds: Normal heart sounds.   Pulmonary:      Effort: Pulmonary effort is normal. No respiratory distress.      Breath sounds: Normal breath sounds.   Abdominal:      General: Bowel sounds are normal. There is no distension.      Palpations: Abdomen is soft.   Musculoskeletal:         General: No swelling. Normal range of motion.      Cervical back: Normal range of motion.   Skin:     General: Skin is warm and dry.   Neurological:      General: No focal deficit present.      Mental Status: He is alert. Mental status is at baseline.      Gait: Gait normal.   Psychiatric:         Mood and Affect: Mood normal.         Behavior: Behavior normal.         Cognition and Memory: Memory is impaired.           Diagnoses and health risks identified today and associated recommendations/orders:  1. Encounter for Medicare annual wellness " exam  Assessments completed. Preventive measures, health maintenance, and immunizations reviewed with patient and patients spouse.  - Referral to Enhanced Annual Wellness Visit (eAWV) W+1    2. Centrilobular emphysema  Stable, followed by PCP.    3. Cavitary lung disease  Stable, followed by PCP and Infectious Disease.    4. Neurodegenerative dementia with psychotic disturbance  Stable, patient on Aricept and Namenda. Followed by PCP and Neurology.    5. Atherosclerosis of aorta  Stable, patient on Pravastatin. Followed by PCP.    6. Chronic kidney disease, stage 3a  Stable, followed by PCP.    7. HILLARY (mycobacterium avium-intracellulare)  Stable, patient on Ethambutol, Inv Clofazimine, Nuzyra. Followed by Infectious Disease.     8. Anxiety  Stable, patient on Lexapro. Followed by PCP.    9. Hypotension, unspecified hypotension type  Stable, followed by PCP. Patient BP low during time of visit. Spouse reports that's normal for patient. Patient denies any symptoms or verbal/nonverbal complaints at time of visit.      Provided Diego with a 5-10 year written screening schedule and personal prevention plan. Recommendations were developed using the USPSTF age appropriate recommendations. Education, counseling, and referrals were provided as needed.  After Visit Summary printed and given to patient which includes a list of additional screenings\tests needed.    Follow up in about 1 year (around 6/27/2026) for your next annual wellness visit.      Cynthia Harris NP      I offered to discuss advanced care planning, including how to pick a person who would make decisions for you if you were unable to make them for yourself, called a health care power of , and what kind of decisions you might make such as use of life sustaining treatments such as ventilators and tube feeding when faced with a life limiting illness recorded on a living will that they will need to know. (How you want to be cared for as you near the  end of your natural life)     X  Patient has advanced directives on file, which we reviewed, and they do not wish to make changes.   fractured extremity

## 2025-07-07 ENCOUNTER — TELEPHONE (OUTPATIENT)
Dept: OTOLARYNGOLOGY | Facility: CLINIC | Age: 85
End: 2025-07-07
Payer: MEDICARE

## 2025-07-09 DIAGNOSIS — A31.0 MAI (MYCOBACTERIUM AVIUM-INTRACELLULARE): Primary | ICD-10-CM

## 2025-07-16 ENCOUNTER — OFFICE VISIT (OUTPATIENT)
Dept: INFECTIOUS DISEASES | Facility: CLINIC | Age: 85
End: 2025-07-16
Payer: MEDICARE

## 2025-07-16 ENCOUNTER — HOSPITAL ENCOUNTER (OUTPATIENT)
Dept: CARDIOLOGY | Facility: CLINIC | Age: 85
Discharge: HOME | End: 2025-07-16
Payer: MEDICARE

## 2025-07-16 VITALS
TEMPERATURE: 98 F | DIASTOLIC BLOOD PRESSURE: 64 MMHG | SYSTOLIC BLOOD PRESSURE: 99 MMHG | HEART RATE: 61 BPM | WEIGHT: 155.44 LBS | BODY MASS INDEX: 23.56 KG/M2 | HEIGHT: 68 IN

## 2025-07-16 DIAGNOSIS — Z16.29: ICD-10-CM

## 2025-07-16 DIAGNOSIS — A31.0 MYCOBACTERIUM AVIUM-INTRACELLULARE COMPLEX: ICD-10-CM

## 2025-07-16 DIAGNOSIS — A31.0 MYCOBACTERIUM AVIUM-INTRACELLULARE COMPLEX: Primary | ICD-10-CM

## 2025-07-16 LAB
OHS QRS DURATION: 92 MS
OHS QTC CALCULATION: 446 MS

## 2025-07-16 PROCEDURE — 3074F SYST BP LT 130 MM HG: CPT | Mod: CPTII,S$GLB,, | Performed by: INTERNAL MEDICINE

## 2025-07-16 PROCEDURE — 1101F PT FALLS ASSESS-DOCD LE1/YR: CPT | Mod: CPTII,S$GLB,, | Performed by: INTERNAL MEDICINE

## 2025-07-16 PROCEDURE — 3078F DIAST BP <80 MM HG: CPT | Mod: CPTII,S$GLB,, | Performed by: INTERNAL MEDICINE

## 2025-07-16 PROCEDURE — 1126F AMNT PAIN NOTED NONE PRSNT: CPT | Mod: CPTII,S$GLB,, | Performed by: INTERNAL MEDICINE

## 2025-07-16 PROCEDURE — 93010 ELECTROCARDIOGRAM REPORT: CPT | Mod: S$GLB,,, | Performed by: STUDENT IN AN ORGANIZED HEALTH CARE EDUCATION/TRAINING PROGRAM

## 2025-07-16 PROCEDURE — 3288F FALL RISK ASSESSMENT DOCD: CPT | Mod: CPTII,S$GLB,, | Performed by: INTERNAL MEDICINE

## 2025-07-16 PROCEDURE — 1157F ADVNC CARE PLAN IN RCRD: CPT | Mod: CPTII,S$GLB,, | Performed by: INTERNAL MEDICINE

## 2025-07-16 PROCEDURE — 99999 PR PBB SHADOW E&M-EST. PATIENT-LVL IV: CPT | Mod: PBBFAC,,, | Performed by: INTERNAL MEDICINE

## 2025-07-16 PROCEDURE — 1159F MED LIST DOCD IN RCRD: CPT | Mod: CPTII,S$GLB,, | Performed by: INTERNAL MEDICINE

## 2025-07-16 PROCEDURE — 99214 OFFICE O/P EST MOD 30 MIN: CPT | Mod: S$GLB,,, | Performed by: INTERNAL MEDICINE

## 2025-07-16 RX ORDER — OMADACYCLINE 150 MG/1
300 TABLET, FILM COATED ORAL DAILY
Qty: 180 TABLET | Refills: 3 | Status: ACTIVE | OUTPATIENT
Start: 2025-07-16 | End: 2026-07-16

## 2025-07-16 RX ORDER — ETHAMBUTOL HYDROCHLORIDE 400 MG/1
800 TABLET, FILM COATED ORAL DAILY
Qty: 180 TABLET | Refills: 3 | Status: SHIPPED | OUTPATIENT
Start: 2025-07-16 | End: 2026-07-16

## 2025-07-16 NOTE — PROGRESS NOTES
INFECTIOUS DISEASE CLINIC  5/22/24  Subjective:      Chief Complaint:   Pulmonary MAC follow up     History of Present Illness:    Patient Diego Gunter is a 84 y.o. male  with h/o emphysema and cavitary MAC, alzheimers, here for MAC follow up. Denies complaints. Tolerating antibiotics. Occasional cough. Tolerating clofazamine, ethambutol, omadacycline. Noticed bedsheets with discoloration since starting clofaz. Compliant with aerobika and inhaled hypertonic saline.       Reviewed micro:     Grew MAC 3/18//25 -- cultures since 4/15/25 have been negative    Organism     MYCOBACTERIUM AVIUM COMPLEX   Antibiotic                   TED (mcg/mL)  Interpretation   ----------------------------------------------------------   Clofazimine                         0.12   Moxifloxacin                            1       S   Clarithromycin                        >64       R   Amikacin (IV)                          64       R   Amikacin (liposomal, inhaled)          64       S   Linezolid                              32       R        Takes prilosec for reflux.     Quit smoking 57 y ago.           Review of Symptoms:  Constitutional: Denies fevers, chills, or weakness.  ENT: Denies dysphagia, nasal discharge, ear pain or discharge.  Cardiovascular: Denies chest pain, palpitations, orthopnea, or claudication.  Respiratory: Denies shortness of breath, cough, hemoptysis, or wheezing.  GI: Denies nausea/vomitting, hematochezia, melena, abd pain, or changes in appetite.  : Denies dysuria, incontinence, or hematuria.  Musculoskeletal: Denies joint pain or myalgias.  Skin/breast: Denies rashes, lumps, lesions, or discharge.  Neurologic: Denies headache, dizziness, vertigo, or paresthesias.    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 Dr. Pham    Hx of colonic polyps     followed by GI. Dr. Pham    Hyperlipidemia associated with type 2 diabetes mellitus     Hyperlipidemia LDL goal <100     Hypernatremia 05/05/2022    Hypertension associated with type 2 diabetes mellitus     Hypertension associated with type 2 diabetes mellitus     Hypotension     Intermittent confusion 04/29/2022    Overweight(278.02)     Prostate cancer 09/2011    followed by urology, Dr. Rogers    Pulmonary embolism     Reducible right inguinal hernia 10/10/2022    Right hemiparesis 07/22/2022    Secondary to cervical hematoma    Type II or unspecified type diabetes mellitus without mention of complication, not stated as uncontrolled     diet controlled    Urinary retention 04/29/2022       Past Surgical History:   Procedure Laterality Date    BRONCHOSCOPY N/A 10/15/2018    Procedure: BRONCHOSCOPY;  Surgeon: Pratibha Diagnostic Provider;  Location: [facility] OR 2ND FLR;  Service: Anesthesiology;  Laterality: N/A;    CARPAL TUNNEL RELEASE Right 1/11/2023    Procedure: RELEASE, CARPAL TUNNEL;  Surgeon: Romero Lazo MD;  Location: [facility] OR;  Service: Orthopedics;  Laterality: Right;    CATARACT EXTRACTION, BILATERAL  2014    COLONOSCOPY N/A 5/16/2023    Procedure: COLONOSCOPY;  Surgeon: Buster Sequeira MD;  Location: [facility] (4TH FLR);  Service: Endoscopy;  Laterality: N/A;    DECOMPRESSION, NERVE, ULNAR Right 1/11/2023    Procedure: DECOMPRESSION, NERVE, ULNAR - right cub jolie and guyon's decompression as well as open right carpal tunnel release;  Surgeon: Romero Lazo MD;  Location: [facility] OR;  Service: Orthopedics;  Laterality: Right;    ESOPHAGOGASTRODUODENOSCOPY N/A 5/16/2023    Procedure: EGD (ESOPHAGOGASTRODUODENOSCOPY);  Surgeon:  Buster Sequeira MD;  Location: [facility] ENDO (4TH FLR);  Service: Endoscopy;  Laterality: N/A;  inst mailed-RB  5/10/23 no answer for precall/mleone lpn    POSTERIOR CERVICAL LAMINECTOMY Bilateral 2022    Procedure: LAMINECTOMY, SPINE, CERVICAL, POSTERIOR APPROACH C3-6;  Surgeon: Carlos Miller MD;  Location: [facility] OR 2ND FLR;  Service: Neurosurgery;  Laterality: Bilateral;    PROSTATECTOMY      REPAIR, HERNIA, INGUINAL, WITHOUT HISTORY OF PRIOR REPAIR, AGE 5 YEARS OR OLDER Right 10/10/2022    Procedure: REPAIR, HERNIA, INGUINAL, WITHOUT HISTORY OF PRIOR REPAIR, AGE 5 YEARS OR OLDER;  Surgeon: Dairo Esquivel MD;  Location: [facility] OR 2ND FLR;  Service: General;  Laterality: Right;  open right inguinal hernia repair with mesh       Family History   Problem Relation Name Age of Onset    Colon cancer Mother      Diabetes Mother      Heart disease Father      Mental illness Sister Rashida     Diabetes Sister Rashida     Other Brother Mayito         polio as a child    Pulmonary fibrosis Brother Mayito     Diabetes Brother Benito     Myasthenia gravis Brother Benito     Parkinsonism Brother Fabio     Diabetes Brother Fabio     Dementia Brother Fabio     Lung cancer Brother Jennifer         smoker    Diabetes Maternal Aunt      Diabetes Other      Esophageal cancer Neg Hx         Social History     Socioeconomic History    Marital status:      Spouse name: Addis    Number of children: 2   Occupational History    Occupation: tool    Tobacco Use    Smoking status: Former     Current packs/day: 0.00     Average packs/day: 0.3 packs/day for 5.0 years (1.3 ttl pk-yrs)     Types: Cigarettes     Start date: 10/2/1957     Quit date: 10/2/1962     Years since quittin.8    Smokeless tobacco: Former    Tobacco comments:     quit age 23   Substance and Sexual Activity    Alcohol use: Not Currently    Drug use: No    Sexual activity: Not Currently     Partners: Female     Social Drivers of Health  "    Financial Resource Strain: Low Risk  (6/27/2025)    Overall Financial Resource Strain (CARDIA)     Difficulty of Paying Living Expenses: Not hard at all   Food Insecurity: No Food Insecurity (6/27/2025)    Hunger Vital Sign     Worried About Running Out of Food in the Last Year: Never true     Ran Out of Food in the Last Year: Never true   Transportation Needs: No Transportation Needs (6/27/2025)    PRAPARE - Transportation     Lack of Transportation (Medical): No     Lack of Transportation (Non-Medical): No   Physical Activity: Insufficiently Active (6/27/2025)    Exercise Vital Sign     Days of Exercise per Week: 4 days     Minutes of Exercise per Session: 20 min   Stress: No Stress Concern Present (6/27/2025)    [employer] - Occupational Stress Questionnaire     Feeling of Stress : Not at all   Housing Stability: Low Risk  (6/27/2025)    Housing Stability Vital Sign     Unable to Pay for Housing in the Last Year: No     Number of Times Moved in the Last Year: 0     Homeless in the Last Year: No       Review of patient's allergies indicates:  No Known Allergies      Objective:   BP 99/64   Pulse 61   Temp 97.6 °F (36.4 °C) (Oral)   Ht 5' 8" (1.727 m)   Wt 70.5 kg (155 lb 6.8 oz)   BMI 23.63 kg/m²     General: Afebrile, alert, comfortable, no acute distress.   HEENT: TERELL. EOMI, no scleral icterus.   Pulmonary: Non labored,clear to auscultation A/P/L. No wheezing, crackles, or rhonchi.  Cardiac: normal S1 & S2 w/o rubs/murmurs/gallops.   Abdominal: Non-tender, non-distended.  Extremities: Moves all extremities x 4.   Skin: No jaundice, rashes, or visible lesions.   Neurological:  Alert and oriented x 4.     Labs:    Glucose   Date Value Ref Range Status   05/30/2025 90 70 - 110 mg/dL Final   08/27/2024 84 70 - 110 mg/dL Final   05/22/2024 85 70 - 110 mg/dL Final   02/21/2024 87 70 - 110 mg/dL Final       Calcium   Date Value Ref Range Status   05/30/2025 9.4 8.7 - 10.5 mg/dL " Final   08/27/2024 9.5 8.7 - 10.5 mg/dL Final   05/22/2024 9.3 8.7 - 10.5 mg/dL Final   02/21/2024 9.4 8.7 - 10.5 mg/dL Final       Albumin   Date Value Ref Range Status   05/30/2025 4.2 3.5 - 5.2 g/dL Final   08/27/2024 4.0 3.5 - 5.2 g/dL Final   05/22/2024 3.8 3.5 - 5.2 g/dL Final   02/21/2024 4.2 3.5 - 5.2 g/dL Final       Protein Total   Date Value Ref Range Status   05/30/2025 7.1 6.0 - 8.4 gm/dL Final     Total Protein   Date Value Ref Range Status   08/27/2024 7.3 6.0 - 8.4 g/dL Final   05/22/2024 7.0 6.0 - 8.4 g/dL Final   02/21/2024 7.6 6.0 - 8.4 g/dL Final       Sodium   Date Value Ref Range Status   05/30/2025 144 136 - 145 mmol/L Final   08/27/2024 138 136 - 145 mmol/L Final   05/22/2024 143 136 - 145 mmol/L Final   02/21/2024 141 136 - 145 mmol/L Final       Potassium   Date Value Ref Range Status   05/30/2025 4.1 3.5 - 5.1 mmol/L Final   08/27/2024 4.7 3.5 - 5.1 mmol/L Final   05/22/2024 4.3 3.5 - 5.1 mmol/L Final   02/21/2024 4.5 3.5 - 5.1 mmol/L Final       CO2   Date Value Ref Range Status   05/30/2025 26 23 - 29 mmol/L Final   08/27/2024 26 23 - 29 mmol/L Final   05/22/2024 28 23 - 29 mmol/L Final   02/21/2024 25 23 - 29 mmol/L Final       Chloride   Date Value Ref Range Status   05/30/2025 108 95 - 110 mmol/L Final   08/27/2024 105 95 - 110 mmol/L Final   05/22/2024 108 95 - 110 mmol/L Final   02/21/2024 105 95 - 110 mmol/L Final       BUN   Date Value Ref Range Status   05/30/2025 16 8 - 23 mg/dL Final   08/27/2024 13 8 - 23 mg/dL Final   05/22/2024 14 8 - 23 mg/dL Final   02/21/2024 10 8 - 23 mg/dL Final       Creatinine   Date Value Ref Range Status   05/30/2025 1.3 0.5 - 1.4 mg/dL Final   08/27/2024 1.4 0.5 - 1.4 mg/dL Final   05/22/2024 1.3 0.5 - 1.4 mg/dL Final   02/21/2024 1.1 0.5 - 1.4 mg/dL Final       Alkaline Phosphatase   Date Value Ref Range Status   08/27/2024 67 55 - 135 U/L Final   05/22/2024 66 55 - 135 U/L Final   02/21/2024 87 55 - 135 U/L Final     ALP   Date Value Ref Range  Status   05/30/2025 62 40 - 150 unit/L Final       ALT   Date Value Ref Range Status   05/30/2025 26 10 - 44 unit/L Final   08/27/2024 43 10 - 44 U/L Final   05/22/2024 31 10 - 44 U/L Final   02/21/2024 36 10 - 44 U/L Final       AST   Date Value Ref Range Status   05/30/2025 23 11 - 45 unit/L Final   08/27/2024 30 10 - 40 U/L Final   05/22/2024 24 10 - 40 U/L Final   02/21/2024 36 10 - 40 U/L Final       Total Bilirubin   Date Value Ref Range Status   08/27/2024 0.4 0.1 - 1.0 mg/dL Final     Comment:     For infants and newborns, interpretation of results should be based  on gestational age, weight and in agreement with clinical  observations.    Premature Infant recommended reference ranges:  Up to 24 hours.............<8.0 mg/dL  Up to 48 hours............<12.0 mg/dL  3-5 days..................<15.0 mg/dL  6-29 days.................<15.0 mg/dL     05/22/2024 0.4 0.1 - 1.0 mg/dL Final     Comment:     For infants and newborns, interpretation of results should be based  on gestational age, weight and in agreement with clinical  observations.    Premature Infant recommended reference ranges:  Up to 24 hours.............<8.0 mg/dL  Up to 48 hours............<12.0 mg/dL  3-5 days..................<15.0 mg/dL  6-29 days.................<15.0 mg/dL     02/21/2024 0.5 0.1 - 1.0 mg/dL Final     Comment:     For infants and newborns, interpretation of results should be based  on gestational age, weight and in agreement with clinical  observations.    Premature Infant recommended reference ranges:  Up to 24 hours.............<8.0 mg/dL  Up to 48 hours............<12.0 mg/dL  3-5 days..................<15.0 mg/dL  6-29 days.................<15.0 mg/dL       Bilirubin Total   Date Value Ref Range Status   05/30/2025 0.9 0.1 - 1.0 mg/dL Final     Comment:     For infants and newborns, interpretation of results should be based   on gestational age, weight and in agreement with clinical   observations.    Premature Infant  recommended reference ranges:   0-24 hours:  <8.0 mg/dL   24-48 hours: <12.0 mg/dL   3-5 days:    <15.0 mg/dL   6-29 days:   <15.0 mg/dL       WBC   Date Value Ref Range Status   05/30/2025 6.87 3.90 - 12.70 K/uL Final   11/24/2023 6.77 3.90 - 12.70 K/uL Final   11/04/2022 6.00 3.90 - 12.70 K/uL Final   10/21/2022 6.65 3.90 - 12.70 K/uL Final       Hemoglobin   Date Value Ref Range Status   11/24/2023 13.1 (L) 14.0 - 18.0 g/dL Final   11/04/2022 10.1 (L) 14.0 - 18.0 g/dL Final   10/21/2022 9.7 (L) 14.0 - 18.0 g/dL Final     HGB   Date Value Ref Range Status   05/30/2025 14.0 14.0 - 18.0 gm/dL Final       POC Hematocrit   Date Value Ref Range Status   04/28/2022 34 (L) 36 - 54 %PCV Final   04/28/2022 42 36 - 54 %PCV Final     Hematocrit   Date Value Ref Range Status   11/24/2023 41.1 40.0 - 54.0 % Final   11/04/2022 31.2 (L) 40.0 - 54.0 % Final   10/21/2022 30.0 (L) 40.0 - 54.0 % Final     HCT   Date Value Ref Range Status   05/30/2025 43.9 40.0 - 54.0 % Final       MCV   Date Value Ref Range Status   05/30/2025 92 82 - 98 fL Final   11/24/2023 88 82 - 98 fL Final   11/04/2022 86 82 - 98 fL Final   10/21/2022 84 82 - 98 fL Final       Platelet Count   Date Value Ref Range Status   05/30/2025 213 150 - 450 K/uL Final     Platelets   Date Value Ref Range Status   11/24/2023 258 150 - 450 K/uL Final   11/04/2022 340 150 - 450 K/uL Final   10/21/2022 396 150 - 450 K/uL Final       Lab Results   Component Value Date    CHOL 125 05/30/2025    CHOL 131 03/20/2024    CHOL 135 03/08/2023       Lab Results   Component Value Date    HDL 45 05/30/2025    HDL 38 (L) 03/20/2024    HDL 40 03/08/2023       Lab Results   Component Value Date    LDLCALC 55.6 (L) 05/30/2025    LDLCALC 73.0 03/20/2024    LDLCALC 70.8 03/08/2023       Lab Results   Component Value Date    TRIG 122 05/30/2025    TRIG 100 03/20/2024    TRIG 121 03/08/2023       Lab Results   Component Value Date    CHOLHDL 36.0 05/30/2025    CHOLHDL 29.0 03/20/2024     "CHOLHDL 29.6 03/08/2023       RPR   Date Value Ref Range Status   12/07/2023 Non-reactive Non-reactive Final   05/08/2022 Non-reactive Non-reactive Final   10/21/2019 Non-reactive Non-reactive Final     No results found for: "QUANTIFERON"    Medications:  Current Outpatient Medications on File Prior to Visit   Medication Sig Dispense Refill    acetaminophen (TYLENOL) 500 MG tablet Take 1 tablet (500 mg total) by mouth every 6 (six) hours as needed. 30 tablet 0    donepeziL (ARICEPT) 10 MG tablet TAKE 1 TABLET (10 MG TOTAL) BY MOUTH ONCE DAILY 30 tablet 1    EScitalopram oxalate (LEXAPRO) 10 MG tablet TAKE 1 AND 1/2 TABLETS(15 MG) BY MOUTH DAILY 135 tablet 1    ethambutoL (MYAMBUTOL) 400 MG Tab Take 2 tablets (800 mg total) by mouth once daily. 60 tablet 5    INV clofazimine capsule Take 2 capsules (100 mg total) by mouth daily with meals. Investigational Medication. Patient Study req# [id number]. Protocol: [id number] 200 capsule 0    levocetirizine (XYZAL) 5 MG tablet Take 1 tablet (5 mg total) by mouth every evening. 30 tablet 0    memantine (NAMENDA) 5 MG Tab Take 1 tablet (5 mg total) by mouth 2 (two) times daily.  60 tablet 11    nebulizer and compressor (OMBRA COMPRESSOR SYSTEM) Marcy use to administer nebulized medication as directed 1 each 0    omadacycline (NUZYRA) 150 mg Tab Take 2 tablets (300 mg total) by mouth once daily. Take on empty stomach (at least 4 hours after eating).  Avoid food and drink (except water) for 2 hours after taking Nuzyra.  Avoid dairy products and antacids for 4 hours after taking Nuzyra. 60 tablet 5    pantoprazole (PROTONIX) 40 MG tablet Take 1 tablet (40 mg total) by mouth once daily. 90 tablet 1    pravastatin (PRAVACHOL) 10 MG tablet TAKE 1 TABLET(10 MG) BY MOUTH EVERY EVENING 90 tablet 3    sodium chloride 7% 7 % nebulizer solution use one vial in nebulizer twice a day 500 mL 11    albuterol (PROAIR HFA) 90 mcg/actuation inhaler Inhale 1 puff into the lungs daily as needed " "for Wheezing (30 min prior to arikacye). Rescue 18 g 6    aspirin (ECOTRIN) 81 MG EC tablet Take 81 mg by mouth once daily.      azelastine (ASTELIN) 137 mcg (0.1 %) nasal spray 2 sprays (274 mcg total) by Nasal route 2 (two) times daily. 30 mL 0    fluticasone propionate (FLONASE) 50 mcg/actuation nasal spray 1 spray (50 mcg total) by Each Nostril route 2 (two) times daily. 16 g 0    sodium chloride (SALINE NASAL) 0.65 % nasal spray 1 spray by Nasal route as needed for Congestion. 30 mL 0    [DISCONTINUED] amLODIPine (NORVASC) 5 MG tablet take 1 tablet by mouth 90 tablet 0    [DISCONTINUED] omega-3 fatty acids 1,000 mg Cap Take 2 g by mouth.       No current facility-administered medications on file prior to visit.       Antibiotics:   Antibiotics (From admission, onward)      None            HIV: No components found for: "HIV 1/2 AG/AB"  Hepatitis C IgG: No components found for: "HEPATITIS C"  Syphilis:   RPR   Date Value Ref Range Status   12/07/2023 Non-reactive Non-reactive Final   05/08/2022 Non-reactive Non-reactive Final   10/21/2019 Non-reactive Non-reactive Final       Hepatitis A IgG: No components found for: "HEPATITIS A IGG"  Hepatitis Bc IgG: No components found for: "HEPATITIS B CORE IGG"  Hepatitis Bs IgG:  Quantiferon: No results found for: "QUANTIFERON"  VZV IgG: No components found for: "VARICELLA IGG"    No components found for: "SEDIMENTATION RATE"  No components found for: "C-REACTIVE PROTEIN"      Microbiology x 7d:   Microbiology Results (last 7 days)       ** No results found for the last 168 hours. **            Immunization History   Administered Date(s) Administered    COVID-19, MRNA, LN-S, PF (Pfizer) (Purple Cap) 01/13/2021, 04/28/2021, 11/05/2021    Influenza 10/19/2022    Influenza - Quadrivalent - High Dose - PF (65 years and older) 11/05/2021, 10/18/2022, 10/03/2023    Influenza - Quadrivalent - PF *Preferred* (6 months and older) 10/06/2014    Influenza - Trivalent - Fluad - " Adjuvanted - PF (65 years and older 09/25/2019    Influenza - Trivalent - Fluzone High Dose - PF (65 years and older) 09/18/2015, 10/04/2016, 10/23/2017, 09/21/2018    Influenza A (H1N1) 2009 Monovalent - IM 01/22/2010    Influenza Split 10/10/2011, 10/06/2014    Pneumococcal Conjugate - 13 Valent 10/28/2015    Pneumococcal Polysaccharide - 23 Valent 07/12/2013    Tdap 03/08/2015    Zoster Recombinant 09/25/2019, 12/30/2019         Reviewed records today as well as relevant labs, cultures, and imaging    Assessment:     Pulmonary MAC, macrolide resistant, iv amikacin resistant (didn't tolerating arikacyce)  Cavitary lung lesion  Nodular liver- noted on CT; US abdomen 10/2021 No compelling sonographic evidence of cirrhosis.  empysema  Antiphospholipid antibody syndrome; h/o coumadin, paused after hematoma       83M with emphysema, macrolide resistant cavitary pulm MAC  symptoms stable (minimal cough, weight stable). CT chest with stable cavitary lung lesion. sputum cultures seem to be clearing, negative since 4/2025      e-consult to [facility]   They recommended adult day unit at [facility] to get an evaluation by their cardiothoracic surgeon (to see if they can offer you surgical resection of your infection).   They also recommended continuing clofazamine, ethambutol, rifampin, and inhaled arikayce (and tezolid and bedaquiline if not cost prohibitive). --- patient declined travel to [place]       Risk factor-   structural: emphysema, bronchiectasis  functional: reflux        Qtc 407 on 3/8/23    The patient has numerous risk factors for disease progression including male gender, older age,  fibrocavitary disease, macrolide resistance, bronchiectasis. pulm disease from macrolide resistent mac has low cure rate independnetly of these other factors.    Goal of treatment is to prevent progression and not cure; not clear that pt is interested in surgery for removal of pulmonary cavitary lesion    No  toxicities from antimicrobials  Extremely medically complex  Patient is high risk for infectious complications given medical comorbidities    Plan:     Continue clofazamine, ethambutol, omadacycline. . Counseled on side effects.    While on ethambutol- self vision screens daily. If blurry vision lasts 2 days in a row, patient known to stop ethambutol and call eye doctor for exam and to let us know that this happened.    continue Aerobika and nebulized hypertonic saline bid for mucous clearance     Prevent reflux- avoid stomach sleeping. Famotidine/tums. Stop liquids 3hr before bedtime     Monitor afb sputum monthly. Duration of abx 1 year from clearance. May be able to finish in 4/2026    - Will monitor drug therapy for toxicity    Repeat CT at end of therapy      I have sent communication to the referring physician and/or primary care provider.     I spent a total of 35 minutes on the day of the visit. This includes face to face time and non-face to face time preparing to see the patient (eg, review of tests), obtaining and/or reviewing separately obtained history, documenting clinical information in the electronic or other health record, independently interpreting results, and communicating results to the patient/family/caregiver, or care coordination.      Paola Rao MD, MPH  Infectious Disease    No orders of the defined types were placed in this encounter.

## 2025-07-21 ENCOUNTER — LAB VISIT (OUTPATIENT)
Dept: LAB | Facility: HOSPITAL | Age: 85
End: 2025-07-21
Attending: INTERNAL MEDICINE
Payer: MEDICARE

## 2025-07-21 DIAGNOSIS — A31.0 MYCOBACTERIUM AVIUM-INTRACELLULARE COMPLEX: ICD-10-CM

## 2025-07-21 PROCEDURE — 87015 SPECIMEN INFECT AGNT CONCNTJ: CPT

## 2025-07-21 PROCEDURE — 87206 SMEAR FLUORESCENT/ACID STAI: CPT

## 2025-07-22 LAB — ACID FAST MOD KINY STN SPEC: NORMAL

## 2025-07-25 ENCOUNTER — OFFICE VISIT (OUTPATIENT)
Dept: OTOLARYNGOLOGY | Facility: CLINIC | Age: 85
End: 2025-07-25
Payer: MEDICARE

## 2025-07-25 ENCOUNTER — CLINICAL SUPPORT (OUTPATIENT)
Dept: OTOLARYNGOLOGY | Facility: CLINIC | Age: 85
End: 2025-07-25
Payer: MEDICARE

## 2025-07-25 VITALS
BODY MASS INDEX: 23.72 KG/M2 | HEART RATE: 52 BPM | DIASTOLIC BLOOD PRESSURE: 68 MMHG | SYSTOLIC BLOOD PRESSURE: 111 MMHG | WEIGHT: 156 LBS

## 2025-07-25 DIAGNOSIS — H90.3 SENSORINEURAL HEARING LOSS (SNHL) OF BOTH EARS: ICD-10-CM

## 2025-07-25 DIAGNOSIS — H61.23 BILATERAL IMPACTED CERUMEN: Primary | ICD-10-CM

## 2025-07-25 DIAGNOSIS — H93.13 TINNITUS OF BOTH EARS: ICD-10-CM

## 2025-07-25 DIAGNOSIS — Z97.4 WEARS HEARING AID IN BOTH EARS: ICD-10-CM

## 2025-07-25 DIAGNOSIS — H90.3 SENSORINEURAL HEARING LOSS (SNHL) OF BOTH EARS: Primary | ICD-10-CM

## 2025-07-25 PROCEDURE — 99999 PR PBB SHADOW E&M-EST. PATIENT-LVL I: CPT | Mod: PBBFAC,,,

## 2025-07-25 PROCEDURE — 99999 PR PBB SHADOW E&M-EST. PATIENT-LVL IV: CPT | Mod: PBBFAC,,, | Performed by: NURSE PRACTITIONER

## 2025-07-25 NOTE — PROGRESS NOTES
Patient ID: Diego Gunter is a 84 y.o. y.o. male    Chief Complaint:   Chief Complaint   Patient presents with    Cerumen Impaction       HPI 4/12/2024: Patient is here for routine wax impaction removal.  he has complaints of hearing loss in the affected ears, but denies pain or drainage.  This has been an issue in the past.  The patient has been using any sort of ear drop to soften the wax.  He has been fit with hearing aids in the past, but reports inconsistent use. He has tinnitus in both ears. He denies drainage and ear pain. 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     Interval HPI 7/25/2025:  Patient presents today for earwax removal. He reports ongoing earwax management using oil drops and cotton with inconsistent effectiveness. One ear accumulates more wax than the other, though current buildup is less severe compared to previous encounters. No other complaints today.       Review of Systems   Constitutional: Negative for fever, chills, fatigue and unexpected weight change.   HENT: Positive for ear blockage. Negative for hearing loss, nosebleeds, congestion, sore throat, facial swelling, rhinorrhea, sneezing, trouble swallowing, dental problem, voice change, postnasal drip, sinus pressure, tinnitus and ear discharge.    Eyes: Negative for redness, itching and visual disturbance.   Respiratory: Negative for cough, choking and wheezing.    Cardiovascular: Negative for chest pain and palpitations.   Gastrointestinal: Negative for abdominal pain.        No reflux.   Musculoskeletal: Negative for gait problem.   Skin: Negative for rash.   Neurological: Negative for dizziness, light-headedness and headaches.     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 Dr. Pham    Hx of colonic polyps     followed by GI. Dr. Pham    Hyperlipidemia associated with type 2 diabetes mellitus     Hyperlipidemia LDL goal <100     Hypernatremia 05/05/2022    Hypertension associated with type 2 diabetes mellitus     Hypertension associated with type 2 diabetes mellitus     Hypotension     Intermittent confusion 04/29/2022    Overweight(278.02)     Prostate cancer 09/2011    followed by urology, Dr. Rogers    Pulmonary embolism     Reducible right inguinal hernia 10/10/2022    Right hemiparesis 07/22/2022    Secondary to cervical hematoma    Type II or unspecified type diabetes mellitus without mention of complication, not stated as uncontrolled     diet controlled    Urinary retention 04/29/2022     Past Surgical History:   Procedure Laterality Date    BRONCHOSCOPY N/A 10/15/2018    Procedure: BRONCHOSCOPY;  Surgeon: Pratibha Diagnostic Provider;  Location: [facility] 2ND FLR;  Service: Anesthesiology;  Laterality: N/A;    CARPAL TUNNEL RELEASE Right 1/11/2023    Procedure: RELEASE, CARPAL TUNNEL;  Surgeon: Romero Lazo MD;  Location: [facility] OR;  Service: Orthopedics;  Laterality: Right;    CATARACT EXTRACTION, BILATERAL  2014    COLONOSCOPY N/A 5/16/2023    Procedure: COLONOSCOPY;  Surgeon: Buster Sequeira MD;  Location: [facility] (4TH FLR);  Service: Endoscopy;  Laterality: N/A;    DECOMPRESSION, NERVE, ULNAR Right 1/11/2023    Procedure: DECOMPRESSION, NERVE, ULNAR - right cub jolie and guyon's decompression as well as open right carpal tunnel release;  Surgeon: Romero Lazo MD;  Location: [facility] OR;  Service: Orthopedics;  Laterality: Right;    ESOPHAGOGASTRODUODENOSCOPY N/A 5/16/2023    Procedure: EGD (ESOPHAGOGASTRODUODENOSCOPY);  Surgeon: Buster Sequeira MD;  Location: [facility] ([facility]  FLR);  Service: Endoscopy;  Laterality: N/A;  inst mailed-RB  5/10/23 no answer for precall/mleone lpn    POSTERIOR CERVICAL LAMINECTOMY Bilateral 2022    Procedure: LAMINECTOMY, SPINE, CERVICAL, POSTERIOR APPROACH C3-6;  Surgeon: Carlos Miller MD;  Location: [facility] OR [address] [place] [address];  Service: Neurosurgery;  Laterality: Bilateral;    PROSTATECTOMY      REPAIR, HERNIA, INGUINAL, WITHOUT HISTORY OF PRIOR REPAIR, AGE 5 YEARS OR OLDER Right 10/10/2022    Procedure: REPAIR, HERNIA, INGUINAL, WITHOUT HISTORY OF PRIOR REPAIR, AGE 5 YEARS OR OLDER;  Surgeon: Dario Esquivel MD;  Location: [facility] OR [address] [place] [address];  Service: General;  Laterality: Right;  open right inguinal hernia repair with mesh     Social History     Socioeconomic History    Marital status:      Spouse name: Addis    Number of children: 2   Occupational History    Occupation: [employer]    Tobacco Use    Smoking status: Former     Current packs/day: 0.00     Average packs/day: 0.3 packs/day for 5.0 years (1.3 ttl pk-yrs)     Types: Cigarettes     Start date: 10/2/1957     Quit date: 10/2/1962     Years since quittin.8    Smokeless tobacco: Former    Tobacco comments:     quit age 23   Substance and Sexual Activity    Alcohol use: Not Currently    Drug use: No    Sexual activity: Not Currently     Partners: Female     Social Drivers of Health     Financial Resource Strain: Low Risk  (2025)    Overall Financial Resource Strain (CARDIA)     Difficulty of Paying Living Expenses: Not hard at all   Food Insecurity: No Food Insecurity (2025)    Hunger Vital Sign     Worried About Running Out of Food in the Last Year: Never true     Ran Out of Food in the Last Year: Never true   Transportation Needs: No Transportation Needs (2025)    PRAPARE - Transportation     Lack of Transportation (Medical): No     Lack of Transportation (Non-Medical): No   Physical Activity: Insufficiently Active (2025)    Exercise Vital Sign     Days of  Exercise per Week: 4 days     Minutes of Exercise per Session: 20 min   Stress: No Stress Concern Present (6/27/2025)    [employer] - Occupational Stress Questionnaire     Feeling of Stress : Not at all   Housing Stability: Low Risk  (6/27/2025)    Housing Stability Vital Sign     Unable to Pay for Housing in the Last Year: No     Number of Times Moved in the Last Year: 0     Homeless in the Last Year: No     Family History   Problem Relation Name Age of Onset    Colon cancer Mother      Diabetes Mother      Heart disease Father      Mental illness Sister Rashida     Diabetes Sister Rashida     Other Brother Mayito         polio as a child    Pulmonary fibrosis Brother Mayito     Diabetes Brother Benito     Myasthenia gravis Brother Benito     Parkinsonism Brother Fabio     Diabetes Brother Fabio     Dementia Brother Fabio     Lung cancer Brother Jennifer         smoker    Diabetes Maternal Aunt      Diabetes Other      Esophageal cancer Neg Hx         Objective:      Vitals:    07/25/25 1307   BP: 111/68   Pulse: (!) 52         Physical Exam   Constitutional: Well appearing / communicating without difficutly.  NAD.  Eyes: EOM I Bilaterally  Head/Face: Normocephalic. Negative paranasal sinus pressure/tenderness.  Salivary glands WNL.  House Brackmann I Bilaterally.     Right Ear: Auricle normal appearance. External Auditory Canal with cerumen impaction. EAC with no lesions or masses,TM w/o masses/lesions/perforations. TM mobility noted.   Left Ear: Auricle normal appearance. External Auditory Canal with cerumen impaction. EAC with no lesions or masses,TM w/o masses/lesions/perforations. TM mobility noted.  Nose: No gross nasal septal deviation. Inferior Turbinates 3+ bilaterally. No septal perforation. No masses/lesions. External nasal skin appears normal without masses/lesions.   Oral Cavity: Gingiva/lips within normal limits.  Dentition/gingiva healthy appearing. Mucus membranes  moist. Floor of mouth soft, no masses palpated. Oral Tongue mobile. Hard Palate appears normal.    Oropharynx: Base of tongue appears normal. No masses/lesions noted. Tonsillar fossa/pharyngeal wall without lesions. Posterior oropharynx WNL.  Soft palate without masses. Midline uvula.   Neck/Lymphatic: No LAD I-VI bilaterally.  No thyromegaly.  No masses noted on exam.     Mirror laryngoscopy/nasopharyngoscopy: Active gag reflex.  Unable to perform.     Neuro/Psychiatric: AOx3.  Normal mood and affect.   Cardiovascular: Normal carotid pulses bilaterally, no increasing jugular venous distention noted at cervical region bilaterally.    Respiratory: Normal respiratory effort, no stridor, no retractions noted.      Cerumen removal under binocular microscopy   Ear Cerumen Removal    Date/Time: 7/25/2025 1:00 PM    Performed by: Diane Dalton NP  Authorized by: Diane Dalton NP      Local anesthetic:  None  Location details:  Both ears  Procedure type: curette    Procedure type comment:  Suction  Cerumen  Removal Results:  Cerumen completely removed  Patient tolerance:  Patient tolerated the procedure well with no immediate complications    Audiogram interpreted personally by me and discussed in detail with the patient today.   Pure tone testing revealed mild sloping to severe sensorineural hearing loss, bilaterally. Speech reception thresholds were obtained at 45 dBHL in the right ear and 50 dBHL in the left ear. Speech discrimination scores were 76% in the right ear and 52% in the left ear. Tympanometry revealed Type a tympanograms in both ears.           Assessment:         ICD-10-CM ICD-9-CM    1. Bilateral impacted cerumen  H61.23 380.4 Ear Cerumen Removal      2. Sensorineural hearing loss (SNHL) of both ears  H90.3 389.18       3. Wears hearing aid in both ears  Z97.4 [id number]              Plan:         IMPACTED CERUMEN:  - Cerumen impaction:  Removed under microscopy today without difficulty.  I would  recommend the use of a wax softening drop, either over the counter Debrox or mineral oil, on a weekly basis.  I also instructed the patient to avoid Qtips.    SNHL  -We reviewed the patient's recent audiogram and hearing loss in detail.   Continue wearing hearing aids. We also discussed the use hearing protection when exposed to loud noise, including lawn equipment. Recommend audiogram in 1 year.        Follow up: 1 year or sooner as needed    Diane Dalton NP    This note was generated with the assistance of ambient listening technology. Verbal consent was obtained by the patient and accompanying visitor(s) for the recording of patient appointment to facilitate this note. I attest to having reviewed and edited the generated note for accuracy, though some syntax or spelling errors may persist. Please contact the author of this note for any clarification.        Answers submitted by the patient for this visit:  Review of Symptoms Questionnaire  (Submitted on 7/22/2025)  None of these: Yes  None of these: Yes  None of these : Yes  None of these: Yes  None of these : Yes  None of these: Yes  None of these: Yes  None of these: Yes  None of these : Yes  None of these: Yes  None of these : Yes  None of these: Yes  None of these: Yes  None of these: Yes

## 2025-07-25 NOTE — PROCEDURES
Ear Cerumen Removal    Date/Time: 7/25/2025 1:00 PM    Performed by: Diane Dalton NP  Authorized by: Diane Dalton NP      Local anesthetic:  None  Location details:  Both ears  Procedure type: curette    Procedure type comment:  Suction  Cerumen  Removal Results:  Cerumen completely removed  Patient tolerance:  Patient tolerated the procedure well with no immediate complications

## 2025-07-25 NOTE — PROGRESS NOTES
Diego Gunter, a 84 y.o. male was seen today in the clinic for annual audiologic evaluation following ear cleaning. Mr. Gunter has history of sensorineural hearing loss along with tinnitus.  Mr. Gunter has been previously referred to audiology due to prescribed use of potentially ototoxic medications when patient noted a worsening of tinnitus perception. No change in hearing perception was noted when comparing his two previous hearing evaluations. The following information was gathered through patient interview and/or record review today:    Previous Audio: 7-9-24; mild sloping to severe sensorineural hearing loss, bilaterally.  Hearing Aids use: inconsistent use of Has in the past; patient reports more consistent use of aids with reminders from his wife  History of noise exposure: occupational noise exposure ([employer])  Tinnitus: AU; unchanged  Ear pain: denied  Dizziness: none reported    Pure tone testing revealed mild sloping to severe sensorineural hearing loss, bilaterally. Speech reception thresholds were obtained at 45 dBHL in the right ear and 50 dBHL in the left ear. Speech discrimination scores were 76% in the right ear and 52% in the left ear. Tympanometry revealed Type a tympanograms in both ears.     Results were reviewed with the patient. No significant change in thresholds were noted. We did discuss importance of consistent use of amplification in relation to speech discrimination scores/processing of auditory information.    Results will be forwarded to Dr. Rao for review.    Recommendations:  Otologic evaluation  Annual audiologic evaluation  Hearing protection in noise  Consistent use of amplification  Hearing aid follow up as needed

## 2025-07-25 NOTE — Clinical Note
Good afternoon Dr. Rao Forwarding you Mr. Gunter's chart for your review with completed annual hearing evaluation. My understanding is that he is no longer being treated with the same medication you had concern of ototoxicity with last year. Mr Cortez does not perceive changes in tinnitus or hearing as compared to previous audio 7/2024.  Please reach out if you have any concerns.  Indu Engel Audiologist

## 2025-07-31 ENCOUNTER — LAB VISIT (OUTPATIENT)
Dept: LAB | Facility: HOSPITAL | Age: 85
End: 2025-07-31
Attending: INTERNAL MEDICINE
Payer: MEDICARE

## 2025-07-31 DIAGNOSIS — A31.0 MYCOBACTERIUM AVIUM-INTRACELLULARE COMPLEX: ICD-10-CM

## 2025-07-31 DIAGNOSIS — Z16.29: ICD-10-CM

## 2025-07-31 LAB
ALBUMIN SERPL BCP-MCNC: 3.9 G/DL (ref 3.5–5.2)
ALP SERPL-CCNC: 67 UNIT/L (ref 40–150)
ALT SERPL W/O P-5'-P-CCNC: 32 UNIT/L (ref 10–44)
ANION GAP (OHS): 6 MMOL/L (ref 8–16)
AST SERPL-CCNC: 42 UNIT/L (ref 11–45)
BILIRUB SERPL-MCNC: 0.5 MG/DL (ref 0.1–1)
BUN SERPL-MCNC: 12 MG/DL (ref 8–23)
CALCIUM SERPL-MCNC: 8.8 MG/DL (ref 8.7–10.5)
CHLORIDE SERPL-SCNC: 106 MMOL/L (ref 95–110)
CO2 SERPL-SCNC: 29 MMOL/L (ref 23–29)
CREAT SERPL-MCNC: 1.3 MG/DL (ref 0.5–1.4)
GFR SERPLBLD CREATININE-BSD FMLA CKD-EPI: 55 ML/MIN/1.73/M2
GLUCOSE SERPL-MCNC: 90 MG/DL (ref 70–110)
POTASSIUM SERPL-SCNC: 4 MMOL/L (ref 3.5–5.1)
PROT SERPL-MCNC: 6.7 GM/DL (ref 6–8.4)
SODIUM SERPL-SCNC: 141 MMOL/L (ref 136–145)

## 2025-07-31 PROCEDURE — 36415 COLL VENOUS BLD VENIPUNCTURE: CPT

## 2025-07-31 PROCEDURE — 82040 ASSAY OF SERUM ALBUMIN: CPT

## 2025-08-01 RX ORDER — SODIUM CHLORIDE FOR INHALATION 7 %
VIAL, NEBULIZER (ML) INHALATION
Qty: 500 ML | Refills: 11 | Status: SHIPPED | OUTPATIENT
Start: 2025-08-01

## 2025-08-04 RX ORDER — DONEPEZIL HYDROCHLORIDE 10 MG/1
TABLET, FILM COATED ORAL
Qty: 30 TABLET | Refills: 1 | Status: CANCELLED | OUTPATIENT
Start: 2025-08-04

## 2025-08-04 NOTE — TELEPHONE ENCOUNTER
Refill Routing Note   Medication(s) are not appropriate for processing by [facility] for the following reason(s):        Outside of protocol    [facility] action(s):  Route             Appointments  past 12m or future 3m with PCP    Date Provider   Last Visit   6/2/2025 Portia Ferreira MD   Next Visit   1/14/2026 Portia Ferreira MD   ED visits in past 90 days: 0        Note composed:9:18 AM 08/04/2025

## 2025-08-08 RX ORDER — DONEPEZIL HYDROCHLORIDE 10 MG/1
TABLET, FILM COATED ORAL
Qty: 30 TABLET | Refills: 1 | Status: SHIPPED | OUTPATIENT
Start: 2025-08-08

## 2025-08-18 ENCOUNTER — LAB VISIT (OUTPATIENT)
Dept: LAB | Facility: HOSPITAL | Age: 85
End: 2025-08-18
Attending: INTERNAL MEDICINE
Payer: MEDICARE

## 2025-08-18 DIAGNOSIS — A31.0 MYCOBACTERIUM AVIUM-INTRACELLULARE COMPLEX: ICD-10-CM

## 2025-08-18 PROCEDURE — 87116 MYCOBACTERIA CULTURE: CPT

## 2025-08-18 PROCEDURE — 87206 SMEAR FLUORESCENT/ACID STAI: CPT

## 2025-08-20 LAB — ACID FAST MOD KINY STN SPEC: NORMAL

## 2025-08-25 ENCOUNTER — OFFICE VISIT (OUTPATIENT)
Dept: NEUROLOGY | Facility: CLINIC | Age: 85
End: 2025-08-25
Payer: MEDICARE

## 2025-08-25 VITALS
BODY MASS INDEX: 24.09 KG/M2 | DIASTOLIC BLOOD PRESSURE: 60 MMHG | HEART RATE: 55 BPM | WEIGHT: 158.94 LBS | HEIGHT: 68 IN | SYSTOLIC BLOOD PRESSURE: 104 MMHG

## 2025-08-25 DIAGNOSIS — R41.841 COGNITIVE COMMUNICATION DEFICIT: ICD-10-CM

## 2025-08-25 DIAGNOSIS — F03.92 NEURODEGENERATIVE DEMENTIA WITH PSYCHOTIC DISTURBANCE: Primary | ICD-10-CM

## 2025-08-25 PROCEDURE — 99999 PR PBB SHADOW E&M-EST. PATIENT-LVL IV: CPT | Mod: PBBFAC,GC,,

## 2025-08-25 RX ORDER — MEMANTINE HYDROCHLORIDE 5 MG/1
5 TABLET ORAL 2 TIMES DAILY
Qty: 60 TABLET | Refills: 11 | Status: SHIPPED | OUTPATIENT
Start: 2025-08-25 | End: 2026-08-20

## 2025-08-25 RX ORDER — DONEPEZIL HYDROCHLORIDE 10 MG/1
10 TABLET, FILM COATED ORAL NIGHTLY
Qty: 30 TABLET | Refills: 11 | Status: SHIPPED | OUTPATIENT
Start: 2025-08-25 | End: 2026-08-25

## 2025-08-29 ENCOUNTER — LAB VISIT (OUTPATIENT)
Dept: LAB | Facility: HOSPITAL | Age: 85
End: 2025-08-29
Attending: INTERNAL MEDICINE
Payer: MEDICARE

## 2025-08-29 DIAGNOSIS — Z16.29: ICD-10-CM

## 2025-08-29 DIAGNOSIS — A31.0 MYCOBACTERIUM AVIUM-INTRACELLULARE COMPLEX: ICD-10-CM

## 2025-08-29 LAB
ALBUMIN SERPL BCP-MCNC: 4 G/DL (ref 3.5–5.2)
ALP SERPL-CCNC: 74 UNIT/L (ref 40–150)
ALT SERPL W/O P-5'-P-CCNC: 25 UNIT/L (ref 10–44)
ANION GAP (OHS): 7 MMOL/L (ref 8–16)
AST SERPL-CCNC: 33 UNIT/L (ref 11–45)
BILIRUB SERPL-MCNC: 0.4 MG/DL (ref 0.1–1)
BUN SERPL-MCNC: 14 MG/DL (ref 8–23)
CALCIUM SERPL-MCNC: 9 MG/DL (ref 8.7–10.5)
CHLORIDE SERPL-SCNC: 109 MMOL/L (ref 95–110)
CO2 SERPL-SCNC: 27 MMOL/L (ref 23–29)
CREAT SERPL-MCNC: 1.2 MG/DL (ref 0.5–1.4)
GFR SERPLBLD CREATININE-BSD FMLA CKD-EPI: 60 ML/MIN/1.73/M2
GLUCOSE SERPL-MCNC: 90 MG/DL (ref 70–110)
POTASSIUM SERPL-SCNC: 4.2 MMOL/L (ref 3.5–5.1)
PROT SERPL-MCNC: 7 GM/DL (ref 6–8.4)
SODIUM SERPL-SCNC: 143 MMOL/L (ref 136–145)

## 2025-08-29 PROCEDURE — 36415 COLL VENOUS BLD VENIPUNCTURE: CPT

## 2025-08-29 PROCEDURE — 80053 COMPREHEN METABOLIC PANEL: CPT

## (undated) DEVICE — DRUG BACITRACIN UNGT OINTMENT

## (undated) DEVICE — SPLINT PLASTER FAST SET 5X30IN

## (undated) DEVICE — APPLICATOR CHLORAPREP ORN 26ML

## (undated) DEVICE — STOCKINETTE DBL PLY ST 4X

## (undated) DEVICE — SUT VICRYL PLUS 0 CT1 18IN

## (undated) DEVICE — Device

## (undated) DEVICE — GOWN POLY REINF BRTH SLV XL

## (undated) DEVICE — DRAPE STERI-DRAPE 1000 17X11IN

## (undated) DEVICE — SLING ARM X-LARGE FOAM STRAP

## (undated) DEVICE — CORD BIPOLAR 12 FOOT

## (undated) DEVICE — STAPLER SKIN PROXIMATE WIDE

## (undated) DEVICE — SUT PROLENE 2-0 30 SH

## (undated) DEVICE — NDL 22GA X1 1/2 REG BEVEL

## (undated) DEVICE — BANDAGE ESMARK ELASTIC ST 4X9

## (undated) DEVICE — ELECTRODE REM PLYHSV RETURN 9

## (undated) DEVICE — SUT 4-0 ETHILON 18 PS-2

## (undated) DEVICE — DRAPE U SPLIT SHEET 54X76IN

## (undated) DEVICE — SUT VICRYL 3-0 27 SH

## (undated) DEVICE — SYR 10CC LUER LOCK

## (undated) DEVICE — DURAPREP SURG SCRUB 26ML

## (undated) DEVICE — SUT VICRYL PLUS 2-0 CT1 18

## (undated) DEVICE — DRUG MARCAINE 0.5% W/EPINEPH

## (undated) DEVICE — TRAY FOLEY 16FR INFECTION CONT

## (undated) DEVICE — DRAPE INCISE IOBAN 2 23X17IN

## (undated) DEVICE — SUT MCRYL PLUS 4-0 PS2 27IN

## (undated) DEVICE — DRAPE STERI U-SHAPED 47X51IN

## (undated) DEVICE — GLOVE BIOGEL SKINSENSE PI 7.5

## (undated) DEVICE — SUT VICRYL 4-0 RB1 27IN UD

## (undated) DEVICE — UNDERPAD ULTRASORB 300LB 30X36

## (undated) DEVICE — DRESSING XEROFORM FOIL PK 1X8

## (undated) DEVICE — 14MM DRILL BIT

## (undated) DEVICE — GLOVE BIOGEL PI MICRO SZ 7.5

## (undated) DEVICE — TOWEL OR DISP STRL BLUE 4/PK

## (undated) DEVICE — NDL HYPO REG 25G X 1 1/2

## (undated) DEVICE — ELECTRODE REM POLYHESIVE II

## (undated) DEVICE — DRAPE C-ARMOR EQUIPMENT COVER

## (undated) DEVICE — CARTRIDGE OIL

## (undated) DEVICE — CORD FOR BIPOLAR FORCEPS 12

## (undated) DEVICE — DRESSING TELFA PAD N ADH 2X3

## (undated) DEVICE — SUT VICRYL CTD 2-0 GI 27 SH

## (undated) DEVICE — DRAPE STERI INSTRUMENT 1018

## (undated) DEVICE — BANDAGE MATRIX HK LOOP 2IN 5YD

## (undated) DEVICE — TIP YANKAUERS BULB NO VENT

## (undated) DEVICE — SEE MEDLINE ITEM 157131

## (undated) DEVICE — PINS SKULL ADULT MAYFIELD
Type: IMPLANTABLE DEVICE | Site: CRANIAL | Status: NON-FUNCTIONAL
Removed: 2022-04-28

## (undated) DEVICE — SEE MEDLINE ITEM 156905

## (undated) DEVICE — SEE MEDLINE ITEM 157150

## (undated) DEVICE — CATH SUCTION 10FR

## (undated) DEVICE — DRAIN PENROSE XRAY 12 X 1/4 ST

## (undated) DEVICE — TUBE FRAZIER 5MM 2FT SOFT TIP

## (undated) DEVICE — MARKER SKIN STND TIP BLUE BARR

## (undated) DEVICE — DRAPE THYROID WITH ARMBOARD

## (undated) DEVICE — GAUZE SPONGE 4X4 12PLY

## (undated) DEVICE — DRUG LIDOCAINE 1% EPI MULTI 25

## (undated) DEVICE — DRAPE LAP T SHT W/ INSTR PAD

## (undated) DEVICE — SEE MEDLINE ITEM 146292

## (undated) DEVICE — UNDERGLOVES BIOGEL PI SIZE 8

## (undated) DEVICE — KIT SURGIFLO HEMOSTATIC MATRIX

## (undated) DEVICE — PACK SET UP CONVERTORS

## (undated) DEVICE — TOURNIQUET SB QC DP 18X4IN

## (undated) DEVICE — SUT 8423H 2-0 PROLENE

## (undated) DEVICE — BURR RND FLUT SFT TOUCH 2.0MM

## (undated) DEVICE — TRAY MINOR GEN SURG OMC

## (undated) DEVICE — GOWN SURGICAL X-LARGE

## (undated) DEVICE — SUT 4/0 18IN ETHILON BL P3

## (undated) DEVICE — TAPE SURG MEDIPORE 6X72IN

## (undated) DEVICE — SUT VICRYL PLUS 3-0 SH 18IN

## (undated) DEVICE — DIFFUSER

## (undated) DEVICE — DRUG BACITRACIN ZINC

## (undated) DEVICE — BUR BONE CUT MICRO TPS 3X3.8MM

## (undated) DEVICE — GAUZE SPONGE PEANUT STRL

## (undated) DEVICE — SUT D SPECIAL VICRYL 2-0

## (undated) DEVICE — ADHESIVE DERMABOND ADVANCED

## (undated) DEVICE — KIT POWDER ABSORBABLE GELATIN